# Patient Record
Sex: FEMALE | Race: WHITE | NOT HISPANIC OR LATINO | Employment: FULL TIME | URBAN - METROPOLITAN AREA
[De-identification: names, ages, dates, MRNs, and addresses within clinical notes are randomized per-mention and may not be internally consistent; named-entity substitution may affect disease eponyms.]

---

## 2019-08-08 ENCOUNTER — TELEPHONE (OUTPATIENT)
Dept: TRANSPLANT | Facility: CLINIC | Age: 49
End: 2019-08-08

## 2019-08-08 DIAGNOSIS — I50.9 CONGESTIVE HEART FAILURE, UNSPECIFIED HF CHRONICITY, UNSPECIFIED HEART FAILURE TYPE: Primary | ICD-10-CM

## 2019-08-15 NOTE — TELEPHONE ENCOUNTER
Called to schedule consult appointment. No answer. Left message along with contact information to call back. Records from referring provider scanned into "[employer]" and sent to be scanned into Epic.

## 2019-08-21 NOTE — TELEPHONE ENCOUNTER
REFERRAL NOTE:    Deborah Navas has been referred to the pre-heart transplant office for consideration for orthotopic heart transplantation by Joaquin Del Toro. Patient's appointments have been scheduled for 08/27/19.  Information was provided and questions were answered. AHF/ Transplant Handout, appointment letter and campus map was mailed to the patient. Pleasant. My contact information. Call PRN. Spoke to Kristina with Dr Del Toro office. Informed of appointment as scheduled with Dr Zarate. Also asked if there have been or can be an echo more recent than 12/18 as was having difficulty scheduling echo here to coordinate with available appointment dates/ times. Call PRN.

## 2019-08-27 ENCOUNTER — LAB VISIT (OUTPATIENT)
Dept: LAB | Facility: HOSPITAL | Age: 49
End: 2019-08-27
Attending: INTERNAL MEDICINE
Payer: COMMERCIAL

## 2019-08-27 ENCOUNTER — CLINICAL SUPPORT (OUTPATIENT)
Dept: TRANSPLANT | Facility: CLINIC | Age: 49
End: 2019-08-27
Payer: COMMERCIAL

## 2019-08-27 ENCOUNTER — INITIAL CONSULT (OUTPATIENT)
Dept: TRANSPLANT | Facility: CLINIC | Age: 49
End: 2019-08-27
Payer: COMMERCIAL

## 2019-08-27 ENCOUNTER — EDUCATION (OUTPATIENT)
Dept: TRANSPLANT | Facility: CLINIC | Age: 49
End: 2019-08-27

## 2019-08-27 ENCOUNTER — HOSPITAL ENCOUNTER (OUTPATIENT)
Dept: PULMONOLOGY | Facility: CLINIC | Age: 49
Discharge: HOME | End: 2019-08-27
Payer: COMMERCIAL

## 2019-08-27 VITALS
WEIGHT: 236.13 LBS | SYSTOLIC BLOOD PRESSURE: 111 MMHG | DIASTOLIC BLOOD PRESSURE: 72 MMHG | HEIGHT: 68 IN | HEART RATE: 68 BPM | OXYGEN SATURATION: 96 % | BODY MASS INDEX: 35.79 KG/M2

## 2019-08-27 VITALS — WEIGHT: 236.13 LBS | HEIGHT: 67 IN | BODY MASS INDEX: 37.06 KG/M2

## 2019-08-27 DIAGNOSIS — E87.6 HYPOKALEMIA: ICD-10-CM

## 2019-08-27 DIAGNOSIS — I42.0 PRIMARY DILATED CARDIOMYOPATHY: ICD-10-CM

## 2019-08-27 DIAGNOSIS — I27.22 PULMONARY HYPERTENSION DUE TO LEFT HEART DISEASE: ICD-10-CM

## 2019-08-27 DIAGNOSIS — I50.9 CONGESTIVE HEART FAILURE, UNSPECIFIED HF CHRONICITY, UNSPECIFIED HEART FAILURE TYPE: ICD-10-CM

## 2019-08-27 DIAGNOSIS — I47.20 VENTRICULAR TACHYARRHYTHMIA: ICD-10-CM

## 2019-08-27 DIAGNOSIS — I50.22 CHRONIC SYSTOLIC CONGESTIVE HEART FAILURE: Primary | ICD-10-CM

## 2019-08-27 DIAGNOSIS — Z95.810 CARDIAC DEFIBRILLATOR IN SITU: ICD-10-CM

## 2019-08-27 DIAGNOSIS — I49.01 VENTRICULAR FIBRILLATION: ICD-10-CM

## 2019-08-27 LAB
ALBUMIN SERPL BCP-MCNC: 3.9 G/DL (ref 3.5–5.2)
ALP SERPL-CCNC: 88 U/L (ref 55–135)
ALT SERPL W/O P-5'-P-CCNC: 17 U/L (ref 10–44)
ANION GAP SERPL CALC-SCNC: 14 MMOL/L (ref 8–16)
AST SERPL-CCNC: 19 U/L (ref 10–40)
BASOPHILS # BLD AUTO: 0.09 K/UL (ref 0–0.2)
BASOPHILS NFR BLD: 1.1 % (ref 0–1.9)
BILIRUB SERPL-MCNC: 0.3 MG/DL (ref 0.1–1)
BNP SERPL-MCNC: 1335 PG/ML (ref 0–99)
BUN SERPL-MCNC: 28 MG/DL (ref 6–20)
CALCIUM SERPL-MCNC: 10.1 MG/DL (ref 8.7–10.5)
CHLORIDE SERPL-SCNC: 103 MMOL/L (ref 95–110)
CO2 SERPL-SCNC: 24 MMOL/L (ref 23–29)
CREAT SERPL-MCNC: 1.8 MG/DL (ref 0.5–1.4)
DIFFERENTIAL METHOD: ABNORMAL
EOSINOPHIL # BLD AUTO: 0.3 K/UL (ref 0–0.5)
EOSINOPHIL NFR BLD: 4.1 % (ref 0–8)
ERYTHROCYTE [DISTWIDTH] IN BLOOD BY AUTOMATED COUNT: 13.9 % (ref 11.5–14.5)
EST. GFR  (AFRICAN AMERICAN): 37.6 ML/MIN/1.73 M^2
EST. GFR  (NON AFRICAN AMERICAN): 32.6 ML/MIN/1.73 M^2
GLUCOSE SERPL-MCNC: 116 MG/DL (ref 70–110)
HCT VFR BLD AUTO: 46 % (ref 37–48.5)
HGB BLD-MCNC: 14.3 G/DL (ref 12–16)
IMM GRANULOCYTES # BLD AUTO: 0.02 K/UL (ref 0–0.04)
IMM GRANULOCYTES NFR BLD AUTO: 0.2 % (ref 0–0.5)
LYMPHOCYTES # BLD AUTO: 3.1 K/UL (ref 1–4.8)
LYMPHOCYTES NFR BLD: 38.1 % (ref 18–48)
MCH RBC QN AUTO: 28.6 PG (ref 27–31)
MCHC RBC AUTO-ENTMCNC: 31.1 G/DL (ref 32–36)
MCV RBC AUTO: 92 FL (ref 82–98)
MONOCYTES # BLD AUTO: 0.6 K/UL (ref 0.3–1)
MONOCYTES NFR BLD: 7.5 % (ref 4–15)
NEUTROPHILS # BLD AUTO: 3.9 K/UL (ref 1.8–7.7)
NEUTROPHILS NFR BLD: 49 % (ref 38–73)
NRBC BLD-RTO: 0 /100 WBC
PLATELET # BLD AUTO: 419 K/UL (ref 150–350)
PMV BLD AUTO: 9.9 FL (ref 9.2–12.9)
POTASSIUM SERPL-SCNC: 4.4 MMOL/L (ref 3.5–5.1)
PROT SERPL-MCNC: 7.6 G/DL (ref 6–8.4)
RBC # BLD AUTO: 5 M/UL (ref 4–5.4)
SODIUM SERPL-SCNC: 141 MMOL/L (ref 136–145)
TSH SERPL DL<=0.005 MIU/L-ACNC: 3.21 UIU/ML (ref 0.4–4)
WBC # BLD AUTO: 8.03 K/UL (ref 3.9–12.7)

## 2019-08-27 PROCEDURE — 3008F BODY MASS INDEX DOCD: CPT | Mod: CPTII,S$GLB,TXP, | Performed by: INTERNAL MEDICINE

## 2019-08-27 PROCEDURE — 84443 ASSAY THYROID STIM HORMONE: CPT | Mod: TXP

## 2019-08-27 PROCEDURE — 99999 PR PBB SHADOW E&M-EST. PATIENT-LVL III: CPT | Mod: PBBFAC,TXP,, | Performed by: INTERNAL MEDICINE

## 2019-08-27 PROCEDURE — 85025 COMPLETE CBC W/AUTO DIFF WBC: CPT | Mod: TXP

## 2019-08-27 PROCEDURE — 94618 PULMONARY STRESS TESTING: ICD-10-PCS | Mod: NTX,S$GLB,, | Performed by: INTERNAL MEDICINE

## 2019-08-27 PROCEDURE — 3008F PR BODY MASS INDEX (BMI) DOCUMENTED: ICD-10-PCS | Mod: CPTII,S$GLB,TXP, | Performed by: INTERNAL MEDICINE

## 2019-08-27 PROCEDURE — 36415 COLL VENOUS BLD VENIPUNCTURE: CPT | Mod: TXP

## 2019-08-27 PROCEDURE — 94618 PULMONARY STRESS TESTING: CPT | Mod: NTX,S$GLB,, | Performed by: INTERNAL MEDICINE

## 2019-08-27 PROCEDURE — 83880 ASSAY OF NATRIURETIC PEPTIDE: CPT | Mod: TXP

## 2019-08-27 PROCEDURE — 99204 OFFICE O/P NEW MOD 45 MIN: CPT | Mod: S$GLB,TXP,, | Performed by: INTERNAL MEDICINE

## 2019-08-27 PROCEDURE — 99999 PR PBB SHADOW E&M-EST. PATIENT-LVL III: ICD-10-PCS | Mod: PBBFAC,TXP,, | Performed by: INTERNAL MEDICINE

## 2019-08-27 PROCEDURE — 80053 COMPREHEN METABOLIC PANEL: CPT | Mod: TXP

## 2019-08-27 PROCEDURE — 99204 PR OFFICE/OUTPT VISIT, NEW, LEVL IV, 45-59 MIN: ICD-10-PCS | Mod: S$GLB,TXP,, | Performed by: INTERNAL MEDICINE

## 2019-08-27 RX ORDER — ASPIRIN 81 MG/1
81 TABLET ORAL
Status: ON HOLD | COMMUNITY
End: 2019-10-01 | Stop reason: HOSPADM

## 2019-08-27 RX ORDER — FUROSEMIDE 40 MG/1
40 TABLET ORAL
Status: ON HOLD | COMMUNITY
Start: 2019-06-17 | End: 2019-10-01 | Stop reason: HOSPADM

## 2019-08-27 RX ORDER — METOLAZONE 5 MG/1
2.5 TABLET ORAL DAILY PRN
Status: ON HOLD | COMMUNITY
Start: 2019-05-01 | End: 2019-10-01 | Stop reason: HOSPADM

## 2019-08-27 RX ORDER — POTASSIUM CHLORIDE 1500 MG/1
2 TABLET, EXTENDED RELEASE ORAL
Status: ON HOLD | COMMUNITY
Start: 2019-08-14 | End: 2019-10-01 | Stop reason: HOSPADM

## 2019-08-27 RX ORDER — LORATADINE 10 MG/1
10 TABLET ORAL DAILY PRN
Status: ON HOLD | COMMUNITY
End: 2019-10-01 | Stop reason: HOSPADM

## 2019-08-27 RX ORDER — AMIODARONE HYDROCHLORIDE 200 MG/1
200 TABLET ORAL 2 TIMES DAILY
Status: ON HOLD | COMMUNITY
Start: 2019-08-19 | End: 2019-10-01 | Stop reason: HOSPADM

## 2019-08-27 RX ORDER — ATORVASTATIN CALCIUM 40 MG/1
40 TABLET, FILM COATED ORAL DAILY
Refills: 3 | COMMUNITY
Start: 2019-07-28 | End: 2019-10-07

## 2019-08-27 RX ORDER — SPIRONOLACTONE 25 MG/1
25 TABLET ORAL
Status: ON HOLD | COMMUNITY
Start: 2019-02-08 | End: 2019-10-01 | Stop reason: HOSPADM

## 2019-08-27 NOTE — H&P (VIEW-ONLY)
Subjective:   Initial evaluation of heart transplant candidacy.    HPI:  Ms. Navas is a 48 y.o. year old White female who has presents to be considered for advanced surgical options (LVAD/OHT).     History of dilated cardiomyopathy and heart failure with reduced EF 20% LVEDd 6.8 cm. She has a history of ICD implantation (Tamie?). Several appropriate shocks (hypokalemia)    Here fro HARSH work up    Six minute distance 1050      Past Medical History:   Diagnosis Date    Fractures     Hyperlipidemia     Migraine     Osteoarthritis      Past Surgical History:   Procedure Laterality Date    BACK SURGERY      2007    BONE GRAFT Left     from Left hip to Left FA    eardrum reconstruction  1980    ELBOW SURGERY Left 0316-4225    FOREARM FRACTURE SURGERY Bilateral 9035-2679    multiple surgeries    INSERTION OF IMPLANTABLE CARDIOVERTER-DEFIBRILLATOR (ICD) GENERATOR WITH TWO EXISTING LEADS      INSERTION OF PACEMAKER Left     LUMBAR FUSION  2007    L4-L5    SINUS SURGERY Right 1994    with lymph nodes    TEMPOROMANDIBULAR JOINT SURGERY Right 1988    TONSILLECTOMY      TYMPANOSTOMY TUBE PLACEMENT  1971- 1979    multiple tube placements       Family History   Problem Relation Age of Onset    Osteoarthritis Mother     Migraines Mother     Osteoarthritis Maternal Grandmother     Migraines Maternal Grandmother     Broken bones Maternal Grandmother     Osteoporosis Maternal Grandmother     Dislocations Maternal Grandmother     Scoliosis Maternal Grandmother     Osteoarthritis Paternal Grandmother     Cancer Paternal Grandmother         leukemia    Migraines Paternal Grandmother     Obesity Paternal Grandmother     Osteoarthritis Paternal Grandfather     Heart failure Paternal Grandfather     Migraines Paternal Grandfather     Heart failure Maternal Grandfather     Migraines Maternal Grandfather     Osteoarthritis Maternal Grandfather     Stroke Father     Osteoarthritis Father   "      ROS    Objective:   Blood pressure 111/72, pulse 68, height 5' 7.5" (1.715 m), weight 107.1 kg (236 lb 1.8 oz), SpO2 96 %.body mass index is 36.43 kg/m².    Physical Exam    Labs:      Chemistry        Component Value Date/Time     08/27/2019 0651    K 4.4 08/27/2019 0651     08/27/2019 0651    CO2 24 08/27/2019 0651    BUN 28 (H) 08/27/2019 0651    CREATININE 1.8 (H) 08/27/2019 0651     (H) 08/27/2019 0651        Component Value Date/Time    CALCIUM 10.1 08/27/2019 0651    ALKPHOS 88 08/27/2019 0651    AST 19 08/27/2019 0651    ALT 17 08/27/2019 0651    BILITOT 0.3 08/27/2019 0651    ESTGFRAFRICA 37.6 (A) 08/27/2019 0651    EGFRNONAA 32.6 (A) 08/27/2019 0651          No results found for: MG  Lab Results   Component Value Date    WBC 8.03 08/27/2019    HGB 14.3 08/27/2019    HCT 46.0 08/27/2019    MCV 92 08/27/2019     (H) 08/27/2019     BNP   Date Value Ref Range Status   08/27/2019 1,335 (H) 0 - 99 pg/mL Final     Comment:     Values of less than 100 pg/ml are consistent with non-CHF populations.     No results found for this or any previous visit.    Labs were reviewed with the patient.    Assessment:      1. Chronic systolic congestive heart failure    2. Pulmonary hypertension due to left heart disease    3. Cardiac defibrillator in situ    4. Primary dilated cardiomyopathy    5. Ventricular fibrillation    6. Ventricular tachyarrhythmia    7. Hypokalemia        Plan:   HFrEF FC II III EFRAIN s/p ICD (several shocks)    We will start RHC and go from     Patient is now NYHA III ACC stage D  Recommend 2 gram sodium restriction and 1500cc fluid restriction.  Encourage physical activity with graded exercise program.  Requested patient to weigh themselves daily, and to notify us if their weight increases by more than 3 lbs in 1 day or 5 lbs in 1 week.     Transplant Candidacy: Patient is a 48 y.o. year old female with heart failure is being seen for possible LVAD and OHT. In my opinion, " she is  an excellent LVAD and OHT candidate. Patient did meet with MCS and/or pre-transplant coordinator at the end of this visit for workup. she is scheduled for risk stratification testing with CPX/RHC. The patient will follow up with pre-transplant.        UNOS Patient Status  Functional Status: 80% - Normal activity with effort: some symptoms of disease  Physical Capacity: Limited Mobility  Working for Income: yes  If yes, working activity level: Working Part Time Due to Demands of Treatment    Johny Zarate MD

## 2019-08-27 NOTE — LETTER
August 27, 2019        Joaquin Del Toro  [address]  [address]  [facility] [address]  Phone: [phone]  Fax: [phone]             [facility] [address]  [facility] [address]  Phone: [phone]   Patient: Deborah Navas   MR Number: [id number]   YOB: 1970   Date of Visit: 8/27/2019       Dear Dr. Joaquin Del Toro    Thank you for referring Deborah Navas to me for evaluation. Attached you will find relevant portions of my assessment and plan of care.    If you have questions, please do not hesitate to call me. I look forward to following Deborah Navas along with you.    Sincerely,    Johny Zarate MD    Enclosure    If you would like to receive this communication electronically, please contact [email or web address] or [phone] to request [employer] access.    [employer] is a tool which provides read-only access to select patient information with whom you have a relationship. Its easy to use and provides real time access to review your patients record including encounter summaries, notes, results, and demographic information.    If you feel you have received this communication in error or would no longer like to receive these types of communications, please e-mail [email or web address]

## 2019-08-27 NOTE — PROGRESS NOTES
Subjective:   Initial evaluation of heart transplant candidacy.    HPI:  Ms. Navas is a 48 y.o. year old White female who has presents to be considered for advanced surgical options (LVAD/OHT).     History of dilated cardiomyopathy and heart failure with reduced EF 20% LVEDd 6.8 cm. She has a history of ICD implantation (Tamie?). Several appropriate shocks (hypokalemia)    Here fro HARSH work up    Six minute distance 1050      Past Medical History:   Diagnosis Date    Fractures     Hyperlipidemia     Migraine     Osteoarthritis      Past Surgical History:   Procedure Laterality Date    BACK SURGERY      2007    BONE GRAFT Left     from Left hip to Left FA    eardrum reconstruction  1980    ELBOW SURGERY Left 4014-0437    FOREARM FRACTURE SURGERY Bilateral 0061-5174    multiple surgeries    INSERTION OF IMPLANTABLE CARDIOVERTER-DEFIBRILLATOR (ICD) GENERATOR WITH TWO EXISTING LEADS      INSERTION OF PACEMAKER Left     LUMBAR FUSION  2007    L4-L5    SINUS SURGERY Right 1994    with lymph nodes    TEMPOROMANDIBULAR JOINT SURGERY Right 1988    TONSILLECTOMY      TYMPANOSTOMY TUBE PLACEMENT  1971- 1979    multiple tube placements       Family History   Problem Relation Age of Onset    Osteoarthritis Mother     Migraines Mother     Osteoarthritis Maternal Grandmother     Migraines Maternal Grandmother     Broken bones Maternal Grandmother     Osteoporosis Maternal Grandmother     Dislocations Maternal Grandmother     Scoliosis Maternal Grandmother     Osteoarthritis Paternal Grandmother     Cancer Paternal Grandmother         leukemia    Migraines Paternal Grandmother     Obesity Paternal Grandmother     Osteoarthritis Paternal Grandfather     Heart failure Paternal Grandfather     Migraines Paternal Grandfather     Heart failure Maternal Grandfather     Migraines Maternal Grandfather     Osteoarthritis Maternal Grandfather     Stroke Father     Osteoarthritis Father   "      ROS    Objective:   Blood pressure 111/72, pulse 68, height 5' 7.5" (1.715 m), weight 107.1 kg (236 lb 1.8 oz), SpO2 96 %.body mass index is 36.43 kg/m².    Physical Exam    Labs:      Chemistry        Component Value Date/Time     08/27/2019 0651    K 4.4 08/27/2019 0651     08/27/2019 0651    CO2 24 08/27/2019 0651    BUN 28 (H) 08/27/2019 0651    CREATININE 1.8 (H) 08/27/2019 0651     (H) 08/27/2019 0651        Component Value Date/Time    CALCIUM 10.1 08/27/2019 0651    ALKPHOS 88 08/27/2019 0651    AST 19 08/27/2019 0651    ALT 17 08/27/2019 0651    BILITOT 0.3 08/27/2019 0651    ESTGFRAFRICA 37.6 (A) 08/27/2019 0651    EGFRNONAA 32.6 (A) 08/27/2019 0651          No results found for: MG  Lab Results   Component Value Date    WBC 8.03 08/27/2019    HGB 14.3 08/27/2019    HCT 46.0 08/27/2019    MCV 92 08/27/2019     (H) 08/27/2019     BNP   Date Value Ref Range Status   08/27/2019 1,335 (H) 0 - 99 pg/mL Final     Comment:     Values of less than 100 pg/ml are consistent with non-CHF populations.     No results found for this or any previous visit.    Labs were reviewed with the patient.    Assessment:      1. Chronic systolic congestive heart failure    2. Pulmonary hypertension due to left heart disease    3. Cardiac defibrillator in situ    4. Primary dilated cardiomyopathy    5. Ventricular fibrillation    6. Ventricular tachyarrhythmia    7. Hypokalemia        Plan:   HFrEF FC II III EFRAIN s/p ICD (several shocks)    We will start RHC and go from     Patient is now NYHA III ACC stage D  Recommend 2 gram sodium restriction and 1500cc fluid restriction.  Encourage physical activity with graded exercise program.  Requested patient to weigh themselves daily, and to notify us if their weight increases by more than 3 lbs in 1 day or 5 lbs in 1 week.     Transplant Candidacy: Patient is a 48 y.o. year old female with heart failure is being seen for possible LVAD and OHT. In my opinion, " she is  an excellent LVAD and OHT candidate. Patient did meet with MCS and/or pre-transplant coordinator at the end of this visit for workup. she is scheduled for risk stratification testing with CPX/RHC. The patient will follow up with pre-transplant.        UNOS Patient Status  Functional Status: 80% - Normal activity with effort: some symptoms of disease  Physical Capacity: Limited Mobility  Working for Income: yes  If yes, working activity level: Working Part Time Due to Demands of Treatment    Johny Zarate MD

## 2019-08-27 NOTE — PROGRESS NOTES
At the request of Dr. Zarate, I have been asked to meet patient and provide VAD education. Introduced self and reason for visit. Pt and mother AAAO.  Provided phase 1 written VAD education. Included in Phase 1 folder is the following:     Evaluation Eval for [facility]  VAD support flyer  Jose: Living a more active life  Living with hVAD system  [employer] pamphlet  Picture of 3 VADs offered at [facility]    Explained that we use 3 different types of pumps here and information on pumps is in the black folder. I explained the work up process as well.     Explained to look over the entire contents and read Evaluation Eval for [facility] acknowledgement form.  Also explained that they should bring this folder with them to all clinic visits and if they are admitted to the hospital so that we can continue education as needed. Should there be any questions, please write them down and bring with you or feel free to call and we can talk on the phone. All questions answered to satisfaction as evidence by verbal acknowledgement.

## 2019-08-28 NOTE — PROCEDURES
Deborah Navas is a 48 y.o.  female patient, who presents for a 6 minute walk test ordered by MD Elliot.  The diagnosis is Pre Heart Transplant.  The patient's BMI is 37.1 kg/m2.  Predicted distance (lower limit of normal) is 360.83 meters.      Test Results:    The test was completed without stopping.  The total time walked was 360 seconds.  During walking, the patient reported:  Dyspnea. The patient used no assistive devices  during testing.     08/27/2019---------Distance: 320.04 meters (1050 feet)     O2 Sat % Supplemental Oxygen Heart Rate Blood Pressure Lesley Scale   Pre-exercise  (Resting) 98 % Room Air 74 bpm 106/71 mmHg 1   During Exercise 97 % Room Air 102 bpm 133/74 mmHg 7-8   Post-exercise  (Recovery) 99 % Room Air  93 bpm   mmHg       Recovery Time: 49 seconds    Performing nurse/tech: Koki SINGH      PREVIOUS STUDY:   The patient has not had a previous study.      CLINICAL INTERPRETATION:  Six minute walk distance is 320.04 meters (1050 feet) with very heavy dyspnea.  During exercise, there was no significant desaturation while breathing room air.  Blood pressure remained stable and Heart rate increased significantly with walking.  This may represent a tachycardic response to exercise.  The patient did not report non-pulmonary symptoms during exercise.  No previous study performed.  Based upon age and body mass index, exercise capacity is less than predicted.

## 2019-08-30 ENCOUNTER — TELEPHONE (OUTPATIENT)
Dept: TRANSPLANT | Facility: CLINIC | Age: 49
End: 2019-08-30

## 2019-08-30 NOTE — TELEPHONE ENCOUNTER
Advised pt that she can still have RHC if she has a cold.             ----- Message from Cherelle Hunter sent at 8/30/2019 12:16 PM CDT -----  Contact: pt [phone]  Pt has a procedure scheduled for 9/4/19 and she has a head cold now. She wants to know if she can still have the procedure    Thanks

## 2019-09-04 ENCOUNTER — HOSPITAL ENCOUNTER (INPATIENT)
Facility: HOSPITAL | Age: 49
LOS: 27 days | Discharge: HOME HEALTH | DRG: 001 | End: 2019-10-01
Attending: INTERNAL MEDICINE | Admitting: INTERNAL MEDICINE
Payer: COMMERCIAL

## 2019-09-04 DIAGNOSIS — I47.20 VENTRICULAR TACHYARRHYTHMIA: ICD-10-CM

## 2019-09-04 DIAGNOSIS — R07.9 CHEST PAIN: ICD-10-CM

## 2019-09-04 DIAGNOSIS — K59.00 CONSTIPATION, UNSPECIFIED CONSTIPATION TYPE: ICD-10-CM

## 2019-09-04 DIAGNOSIS — I48.92 ATRIAL FLUTTER: ICD-10-CM

## 2019-09-04 DIAGNOSIS — I50.20 SYSTOLIC HF (HEART FAILURE): ICD-10-CM

## 2019-09-04 DIAGNOSIS — R73.9 ACUTE HYPERGLYCEMIA: ICD-10-CM

## 2019-09-04 DIAGNOSIS — I48.91 A-FIB: ICD-10-CM

## 2019-09-04 DIAGNOSIS — Z95.810 ICD (IMPLANTABLE CARDIOVERTER-DEFIBRILLATOR) IN PLACE: ICD-10-CM

## 2019-09-04 DIAGNOSIS — Z71.89 GOALS OF CARE, COUNSELING/DISCUSSION: ICD-10-CM

## 2019-09-04 DIAGNOSIS — I50.43 ACUTE ON CHRONIC COMBINED SYSTOLIC AND DIASTOLIC CONGESTIVE HEART FAILURE: ICD-10-CM

## 2019-09-04 DIAGNOSIS — Z95.811 LVAD (LEFT VENTRICULAR ASSIST DEVICE) PRESENT: ICD-10-CM

## 2019-09-04 DIAGNOSIS — Z79.01 ANTICOAGULATION MONITORING, INR RANGE 2-3: ICD-10-CM

## 2019-09-04 DIAGNOSIS — Z99.11 ENCOUNTER FOR WEANING FROM VENTILATOR: ICD-10-CM

## 2019-09-04 DIAGNOSIS — I49.01 VENTRICULAR FIBRILLATION: ICD-10-CM

## 2019-09-04 DIAGNOSIS — I42.0 CONGESTIVE CARDIOMYOPATHY: ICD-10-CM

## 2019-09-04 DIAGNOSIS — I48.91 ATRIAL FIBRILLATION AND FLUTTER: ICD-10-CM

## 2019-09-04 DIAGNOSIS — J90 PLEURAL EFFUSION: ICD-10-CM

## 2019-09-04 DIAGNOSIS — I49.8 ATRIAL ARRHYTHMIA: ICD-10-CM

## 2019-09-04 DIAGNOSIS — I48.91 ATRIAL FIBRILLATION, UNSPECIFIED TYPE: ICD-10-CM

## 2019-09-04 DIAGNOSIS — I50.9 CHF (CONGESTIVE HEART FAILURE): ICD-10-CM

## 2019-09-04 DIAGNOSIS — I95.1 ORTHOSTATIC HYPOTENSION: ICD-10-CM

## 2019-09-04 DIAGNOSIS — R00.1 BRADYCARDIA: ICD-10-CM

## 2019-09-04 DIAGNOSIS — Z86.79 HISTORY OF VENTRICULAR TACHYCARDIA: ICD-10-CM

## 2019-09-04 DIAGNOSIS — I50.9 HEART FAILURE: ICD-10-CM

## 2019-09-04 DIAGNOSIS — Z76.82 HEART TRANSPLANT CANDIDATE: ICD-10-CM

## 2019-09-04 DIAGNOSIS — Z51.5 PALLIATIVE CARE ENCOUNTER: ICD-10-CM

## 2019-09-04 DIAGNOSIS — I50.43 ACUTE ON CHRONIC COMBINED SYSTOLIC AND DIASTOLIC HEART FAILURE: Primary | ICD-10-CM

## 2019-09-04 DIAGNOSIS — I50.9 HF (HEART FAILURE): ICD-10-CM

## 2019-09-04 DIAGNOSIS — N18.9 CHRONIC KIDNEY DISEASE, UNSPECIFIED CKD STAGE: ICD-10-CM

## 2019-09-04 DIAGNOSIS — Z95.810 CARDIAC DEFIBRILLATOR IN SITU: ICD-10-CM

## 2019-09-04 DIAGNOSIS — I48.92 ATRIAL FIBRILLATION AND FLUTTER: ICD-10-CM

## 2019-09-04 DIAGNOSIS — I50.22 CHRONIC SYSTOLIC CONGESTIVE HEART FAILURE: ICD-10-CM

## 2019-09-04 PROBLEM — E87.6 HYPOKALEMIA: Status: RESOLVED | Noted: 2019-05-08 | Resolved: 2019-09-04

## 2019-09-04 LAB
ANION GAP SERPL CALC-SCNC: 11 MMOL/L (ref 8–16)
BUN SERPL-MCNC: 35 MG/DL (ref 6–20)
CALCIUM SERPL-MCNC: 9.7 MG/DL (ref 8.7–10.5)
CHLORIDE SERPL-SCNC: 103 MMOL/L (ref 95–110)
CO2 SERPL-SCNC: 26 MMOL/L (ref 23–29)
CREAT SERPL-MCNC: 1.5 MG/DL (ref 0.5–1.4)
EST. GFR  (AFRICAN AMERICAN): 46.8 ML/MIN/1.73 M^2
EST. GFR  (NON AFRICAN AMERICAN): 40.6 ML/MIN/1.73 M^2
GLUCOSE SERPL-MCNC: 113 MG/DL (ref 70–110)
MAGNESIUM SERPL-MCNC: 2.3 MG/DL (ref 1.6–2.6)
POTASSIUM SERPL-SCNC: 4.3 MMOL/L (ref 3.5–5.1)
SODIUM SERPL-SCNC: 140 MMOL/L (ref 136–145)

## 2019-09-04 PROCEDURE — 93451 RIGHT HEART CATH: CPT | Mod: 26,NTX,, | Performed by: INTERNAL MEDICINE

## 2019-09-04 PROCEDURE — C1894 INTRO/SHEATH, NON-LASER: HCPCS | Mod: NTX | Performed by: INTERNAL MEDICINE

## 2019-09-04 PROCEDURE — 20600001 HC STEP DOWN PRIVATE ROOM: Mod: NTX

## 2019-09-04 PROCEDURE — 93010 EKG 12-LEAD: ICD-10-PCS | Mod: NTX,,, | Performed by: INTERNAL MEDICINE

## 2019-09-04 PROCEDURE — 63600175 PHARM REV CODE 636 W HCPCS: Mod: NTX | Performed by: INTERNAL MEDICINE

## 2019-09-04 PROCEDURE — C1751 CATH, INF, PER/CENT/MIDLINE: HCPCS | Mod: NTX | Performed by: INTERNAL MEDICINE

## 2019-09-04 PROCEDURE — 93005 ELECTROCARDIOGRAM TRACING: CPT | Mod: NTX

## 2019-09-04 PROCEDURE — 80048 BASIC METABOLIC PNL TOTAL CA: CPT | Mod: NTX

## 2019-09-04 PROCEDURE — 25000003 PHARM REV CODE 250: Mod: TXP | Performed by: INTERNAL MEDICINE

## 2019-09-04 PROCEDURE — 99222 PR INITIAL HOSPITAL CARE,LEVL II: ICD-10-PCS | Mod: NTX,,, | Performed by: INTERNAL MEDICINE

## 2019-09-04 PROCEDURE — 99222 1ST HOSP IP/OBS MODERATE 55: CPT | Mod: NTX,,, | Performed by: INTERNAL MEDICINE

## 2019-09-04 PROCEDURE — 83735 ASSAY OF MAGNESIUM: CPT | Mod: NTX

## 2019-09-04 PROCEDURE — 25000003 PHARM REV CODE 250: Mod: NTX | Performed by: INTERNAL MEDICINE

## 2019-09-04 PROCEDURE — 93451 PR RIGHT HEART CATH O2 SATURATION & CARDIAC OUTPUT: ICD-10-PCS | Mod: 26,NTX,, | Performed by: INTERNAL MEDICINE

## 2019-09-04 PROCEDURE — 25000003 PHARM REV CODE 250: Mod: NTX | Performed by: STUDENT IN AN ORGANIZED HEALTH CARE EDUCATION/TRAINING PROGRAM

## 2019-09-04 PROCEDURE — 93451 RIGHT HEART CATH: CPT | Mod: NTX | Performed by: INTERNAL MEDICINE

## 2019-09-04 PROCEDURE — 93010 ELECTROCARDIOGRAM REPORT: CPT | Mod: NTX,,, | Performed by: INTERNAL MEDICINE

## 2019-09-04 DEVICE — CATH BLUE FLEXTIP TL 7FR: Type: IMPLANTABLE DEVICE | Site: NECK | Status: NON-FUNCTIONAL

## 2019-09-04 RX ORDER — DOBUTAMINE HYDROCHLORIDE 400 MG/100ML
5 INJECTION, SOLUTION INTRAVENOUS CONTINUOUS
Status: DISCONTINUED | OUTPATIENT
Start: 2019-09-04 | End: 2019-09-10

## 2019-09-04 RX ORDER — AMIODARONE HYDROCHLORIDE 200 MG/1
200 TABLET ORAL 2 TIMES DAILY
Status: DISCONTINUED | OUTPATIENT
Start: 2019-09-04 | End: 2019-09-10

## 2019-09-04 RX ORDER — DOCUSATE SODIUM 100 MG/1
100 CAPSULE, LIQUID FILLED ORAL DAILY
Status: ON HOLD | COMMUNITY
End: 2019-10-01 | Stop reason: HOSPADM

## 2019-09-04 RX ORDER — SPIRONOLACTONE 25 MG/1
25 TABLET ORAL DAILY
Status: DISCONTINUED | OUTPATIENT
Start: 2019-09-05 | End: 2019-09-10

## 2019-09-04 RX ORDER — GABAPENTIN 400 MG/1
800 CAPSULE ORAL 2 TIMES DAILY
Status: DISCONTINUED | OUTPATIENT
Start: 2019-09-04 | End: 2019-09-05

## 2019-09-04 RX ORDER — SODIUM CHLORIDE 0.9 % (FLUSH) 0.9 %
3 SYRINGE (ML) INJECTION EVERY 8 HOURS PRN
Status: DISCONTINUED | OUTPATIENT
Start: 2019-09-04 | End: 2019-09-10

## 2019-09-04 RX ORDER — ATORVASTATIN CALCIUM 20 MG/1
40 TABLET, FILM COATED ORAL DAILY
Status: DISCONTINUED | OUTPATIENT
Start: 2019-09-05 | End: 2019-09-08

## 2019-09-04 RX ORDER — DIPHENHYDRAMINE HCL 25 MG
25 CAPSULE ORAL EVERY 6 HOURS PRN
Status: DISCONTINUED | OUTPATIENT
Start: 2019-09-04 | End: 2019-09-10

## 2019-09-04 RX ORDER — DOCUSATE SODIUM 100 MG/1
100 CAPSULE, LIQUID FILLED ORAL DAILY
Status: DISCONTINUED | OUTPATIENT
Start: 2019-09-05 | End: 2019-09-10

## 2019-09-04 RX ORDER — SODIUM CHLORIDE 0.9 G/100ML
IRRIGANT IRRIGATION
Status: DISCONTINUED | OUTPATIENT
Start: 2019-09-04 | End: 2019-09-04 | Stop reason: HOSPADM

## 2019-09-04 RX ORDER — POTASSIUM CHLORIDE 20 MEQ/1
40 TABLET, EXTENDED RELEASE ORAL DAILY
Status: DISCONTINUED | OUTPATIENT
Start: 2019-09-05 | End: 2019-09-08

## 2019-09-04 RX ORDER — LIDOCAINE HYDROCHLORIDE 20 MG/ML
INJECTION, SOLUTION INFILTRATION; PERINEURAL
Status: DISCONTINUED | OUTPATIENT
Start: 2019-09-04 | End: 2019-09-04 | Stop reason: HOSPADM

## 2019-09-04 RX ORDER — HEPARIN SODIUM 5000 [USP'U]/ML
5000 INJECTION, SOLUTION INTRAVENOUS; SUBCUTANEOUS EVERY 8 HOURS
Status: DISCONTINUED | OUTPATIENT
Start: 2019-09-04 | End: 2019-09-09

## 2019-09-04 RX ORDER — ASPIRIN 81 MG/1
81 TABLET ORAL DAILY
Status: DISCONTINUED | OUTPATIENT
Start: 2019-09-05 | End: 2019-09-10

## 2019-09-04 RX ADMIN — DIPHENHYDRAMINE HYDROCHLORIDE 25 MG: 25 CAPSULE ORAL at 11:09

## 2019-09-04 RX ADMIN — FUROSEMIDE 10 MG/HR: 10 INJECTION, SOLUTION INTRAMUSCULAR; INTRAVENOUS at 08:09

## 2019-09-04 RX ADMIN — GABAPENTIN 800 MG: 400 CAPSULE ORAL at 09:09

## 2019-09-04 RX ADMIN — AMIODARONE HYDROCHLORIDE 200 MG: 200 TABLET ORAL at 09:09

## 2019-09-04 RX ADMIN — HEPARIN SODIUM 5000 UNITS: 5000 INJECTION, SOLUTION INTRAVENOUS; SUBCUTANEOUS at 09:09

## 2019-09-04 RX ADMIN — DOBUTAMINE IN DEXTROSE 2.5 MCG/KG/MIN: 200 INJECTION, SOLUTION INTRAVENOUS at 08:09

## 2019-09-04 NOTE — NURSING TRANSFER
Nursing Transfer Note      9/4/2019     Transfer From: cath lab    Transfer via stretcher    Transfer with cardiac monitoring    Transported by valentin obsorn    Medicines sent: none        Notified: sister is at bedside

## 2019-09-04 NOTE — LETTER
[address]  [facility] [address]  Phone: [phone]  Fax: [phone]            09/23/2019        [facility] 's Office  [address] [place] [address]    To Whom It May Concern,     This letter is to inform you that we anticipate that one of our patients will be discharged into your community.  We want to let you know because this patient has special healthcare needs.      The patient (whose contact information appears below) has a Heartmate 3 Left Ventricular Assist Device (LVAD) or blood pump.  The device is surgically implanted alongside the patients native heart.  It takes over the pumping function of the patients sick or weakened heart so that the patients lungs, organs, and tissues get the oxygen-rich blood they need.  The LVAD is a life-sustaining device. The patients information is as follows:    Deborah Hazel Yosef  48 y.o. 1970  [address] [facility] [address]    This device runs on either AC power or external batteries.  The external batteries are worn in a double holster located on both the right and left side of the body.  (See photo). The holster cannot be removed.  The Heartmate  will be connected between the patient and wall unit or external batteries. THIS PATIENT MAY NOT HAVE A PALPABLE PULSE.    For additional information, visit [email or web address] for further VAD education materials.       Please contact one of the VAD coordinators with any further questions or concerns.  Kate De Los Santos RN, CCRN     [phone]   Suha Meraz RN--[phone]   SASHA De LeónN, RN, CCRN [phone]   IDALMIS Shafer, RN--[phone]   IDALMIS Jasso, RN, CCRN [phone]   VAD Office Fax: [phone]  Sincerely,      Miriam Prieto M.D.  Medical Director, Mechanical Circulatory Device Support Program  Section of Cardiomyopathy & Heart Transplantation

## 2019-09-04 NOTE — LETTER
[address]  [facility] [address]  Phone: [phone]  Fax: [phone]                                  09/23/2019  [facility] [address] [place] [address]      To Whom It May Concern:    This letter is to inform you that one of our patients will be discharged to your community.  We want to let you know because this patient has special health needs.     This patient (whose information appears below) has a Heartmate 3 Left Ventricular Assist Device (LVAD) or blood pump.   The device is implanted inside the patients native heart.  It takes over pumping so that the patients organs and tissues get the oxygen-rich blood they need.  The VAD is a life-sustaining device.  The patients information is as follows:    Deborah Warrenalex Navas  48 y.o. 1970  [address] [place] [address]    This device runs on batteries and on AC power.  If the patient should present to you during a medical emergency, please contact us immediately at ([phone]) and ask to page VAD coordinator on call for VAD-specific emergency instructions. THIS PATIENT CAN HAVE CHEST COMPRESSIONS if medically necessary and CAN BE Defibrillated/DC Cardioverted without disconnecting the controller. Please DO NOT use a DADA  Device.  This device is fluid dependent so  cc to start if required.   THIS PATIENT MAY NOT HAVE A BLOOD PRESSURE OR A PALPABLE PULSE.     For additional information, visit [email or web address] for further VAD education materials.       Please contact one of the VAD coordinators with any further questions or concerns.  Kate De Los Santos, RN, CCRN     [phone]   Suha Meraz, RN--[phone]   SASHA De LeónN, RN, CCRN [phone]   IDALMIS Shafer, RN--[phone]   IDALMIS Jasso, RN, CCRN [phone]   VAD Office Fax: [phone]  Sincerely,      Miriam Prieto M.D.  Medical Director, Mechanical Circulatory Device Support Program  Section of  Cardiomyopathy & Heart Transplantation

## 2019-09-04 NOTE — NURSING
Report called to roshan, rn, pt going to room 310, vss, no pain/sob, sister Flavia notified, R IJ cordis/tlc intact, all belongings sent with patient

## 2019-09-04 NOTE — LETTER
[address]  [facility] [address]  Phone: [phone]  Fax: [phone]                                  09/23/2019  [facility]  [address] [place] [address]      To Whom It May Concern:    This letter is to inform you that one of our patients will be discharged to your community.  We want to let you know because this patient has special health needs.     This patient (whose information appears below) has a Heartmate 3 Left Ventricular Assist Device (LVAD) or blood pump.   The device is implanted inside the patients native heart.  It takes over pumping so that the patients organs and tissues get the oxygen-rich blood they need.  The VAD is a life-sustaining device.  The patients information is as follows:    Deborah Oliva Yosef  48 y.o. 1970  [address] [facility] [address]    This device runs on batteries and on AC power.  If the patient should present to you during a medical emergency, please contact us immediately at ([phone]) and ask to page VAD coordinator on call for VAD-specific emergency instructions. THIS PATIENT CAN HAVE CHEST COMPRESSIONS if medically necessary and CAN BE Defibrillated/DC Cardioverted without disconnecting the controller. Please DO NOT use a DADA  Device.  This device is fluid dependent so  cc to start if required.   THIS PATIENT MAY NOT HAVE A BLOOD PRESSURE OR A PALPABLE PULSE.     For additional information, visit [email or web address] for further VAD education materials.       Please contact one of the VAD coordinators with any further questions or concerns.  Kate De Los Santos, RN, CCRN     [phone]   Suha Meraz RN--[phone]   SASHA De LeónN, RN, CCRN [phone]   IDALMIS Shafer, RN--[phone]   IDALMIS Jasso, RN, CCRN [phone]   VAD Office Fax: [phone]  Sincerely,      Miriam Prieto M.D.  Medical Director, Mechanical Circulatory Device Support Program  Section of  Cardiomyopathy & Heart Transplantation

## 2019-09-04 NOTE — DISCHARGE INSTRUCTIONS

## 2019-09-04 NOTE — H&P
Please see history and physical of Marjorie Hairston    She reports class 3 NYHA symptomatology  Neck veins too high based upon RHC to see on  Exam  Lungs clear  S1 and 2 OK; resting S3 grade 1/6 systolic murmur  Abdomen obese hard to determine liver span  No significant edema    She underwent right heart catheterization today that demonstrated severely reduced cardiac index 1.0 with elevated right and left sided filling pressure.  While creatinine elevation is noted she has normal liver function.    She reports 5 discharges of her ICD this year to occurred close proximity the others were  by 2-3 months and she reports all were associated with potassium of approximately 3.  As result, she should tolerate an inotrope and I would like to did initiate this in the hospital to observe her rhythm and response of her creatinine.    I spoke with her about both cardiac transplantation and left ventricular assist device implantation.  She received literature on these procedures as an outpatient.  She scanned them but has not been done an in-depth dive so I asked her to do that now.  I discussed the mortality and morbidity of an LVAD versus medical therapy of her heart failure and did the same for cardiac transplantation.  She is blood type O and large woman thus it is impractical to think that she will have a short wait for transplant if she is indeed a candidate.  Therefore, if her left ventricular size allows for LVAD implantation that would be the preferable 1st step.  She understands this and wishes to proceed.  Will initiate pathway.  We need to get an echocardiogram here for left ventricular dimensions.

## 2019-09-04 NOTE — LETTER
[address]  [facility] [address]  Phone: [phone]  Fax: [phone]            09/23/2019  Dr. Valeria Verma  [address] [address]      Dear Dr. Verma,    One of our patients was recently implanted with a Left Ventricular Assist Device (LVAD).  This device takes over the pumping function of the native hearts left ventricle.    I am contacting you because this patient (whose information appears below) is about to be discharged from the hospital and return home. This patient has identified you as the Primary Care Physician.  If the patient should present to you during a medical emergency, please contact us immediately for LVAD-specific emergency instructions.    Deborah Navas  48 y.o. 1970  [address] [place] [address]    I have included educational material regarding the LVAD for you and your staff.  This patient is fully trained to handle the LVAD properly.     For additional information, visit [email or web address] for further VAD education materials.       Please contact one of the VAD coordinators with any further questions or concerns.  Kate De Los Santos, RN, CCRN     [phone]   Suha Meraz, RN--[phone]   Nicole Jesus, SASHAN, RN, CCRN [phone]   Aimee Kelley, BSN, RN--[phone]   SASHA JassoN, RN, CCRN [phone]   VAD Office Fax: [phone]  Sincerely,      Miriam Prieto M.D.  Medical Director, Mechanical Circulatory Device Support Program  Section of Cardiomyopathy & Heart Transplantation

## 2019-09-04 NOTE — LETTER
[address]  [facility] [address]  Phone: [phone]  Fax: [phone]                                  09/23/2019  [facility] [address]  [place] [address]      To Whom It May Concern:    This letter is to inform you that one of our patients will be discharged to your community.  We want to let you know because this patient has special health needs.     This patient (whose information appears below) has a Heartmate 3 Left Ventricular Assist Device (LVAD) or blood pump.   The device is implanted inside the patients native heart.  It takes over pumping so that the patients organs and tissues get the oxygen-rich blood they need.  The VAD is a life-sustaining device.  The patients information is as follows:    Deborah Navas  48 y.o. 1970  [address] [facility] [place] [address]    This device runs on batteries and on AC power.  If the patient should present to you during a medical emergency, please contact us immediately at ([phone]) and ask to page VAD coordinator on call for VAD-specific emergency instructions. THIS PATIENT CAN HAVE CHEST COMPRESSIONS if medically necessary and CAN BE Defibrillated/DC Cardioverted without disconnecting the controller. Please DO NOT use a DADA  Device.  This device is fluid dependent so  cc to start if required.   THIS PATIENT MAY NOT HAVE A BLOOD PRESSURE OR A PALPABLE PULSE.     For additional information, visit [email or web address] for further VAD education materials.       Please contact one of the VAD coordinators with any further questions or concerns.  Kate De Los Santos, RN, CCRN     [phone]   Suha Meraz RN--[phone]   SASHA De LeónN, RN, CCRN [phone]   IDALMIS Shafer, RN--[phone]   IDALMIS Jasso, RN, CCRN [phone]   VAD Office Fax: [phone]  Sincerely,      Miriam Prieto M.D.  Medical Director, Mechanical Circulatory Device Support Program  Section of  Cardiomyopathy & Heart Transplantation

## 2019-09-04 NOTE — DISCHARGE SUMMARY
Date of admit to cath lab: 9/4/2019      Date of discharge from cath lab: 9/4/2019      Principal diagnosis: cardiogenic shock    Discharge attending physician: Vi Bryant MD    Hospital Course/Outcome of the treatment, procedures or surgery: Pt admitted for RHC.   See CVIS/cath lab procedure report in EPIC  for full report of today's procedure.    Disposition of the case (d/c disposition): holding area, admit to CMICU/CSU pending bed avail    Discharge Medication List: see med card    Plan for follow up care, diet, activity level: F/U as scheduled. Resume low Na diet.  Activity as tolerated    Condition on discharge from Cath lab: Stable.

## 2019-09-04 NOTE — LETTER
[address]  [facility] [address]  Phone: [phone]  Fax: [phone]     09/06/2019     To Whom It May Concern:    Deborah Nvaas is a 48 y.o. female patient hospitalized at [facility] since 9/4/19  for acute decompensated heart failure. She was seen for consideration for a VAD device. She has a diagnosis of non-ischemic cardiomyopathy. She is receiving continuous infusion of Dobutamine at 5 mcg/kg/min as inotropic life support measure. Her current status requires Heart Mate III for life support. She is at risk of sudden death, her symptoms remain despite tolerated medical treatment. She is severely impaired in her exercise tolerance with a NYHA Functional Class IV.     Most Recent Ejection Fraction:   EF 10%      We feel that with her current level of impairment, her risk of mortality over the next several weeks is excessive.  Additionally, her functional impairment makes her quality of life extremely poor.  We feel that her only option at this time, after thorough review of all her findings, is HeartMate III placement on 9/10/19 as destination therapy . We recommend VAD placement per our CTS as there are no contraindications.       Therefore, we request your URGENT REVIEW of this case and approval for funding of this procedure.      If we can be of any further assistance, please do not hesitate to contact us at the above address.    Sincerely,     Miriam Prieto M.D.  Medical Director, Mechanical Circulatory Device Support Program  Section of Cardiomyopathy & Heart Transplantation

## 2019-09-04 NOTE — Clinical Note
The PA catheter is repositioned to the right pulmonary artery. Hemodynamics performed. O2 saturation measured at 34%.

## 2019-09-04 NOTE — LETTER
[address]  [facility] [address]  Phone: [phone]  Fax: [phone]                                  09/20/2019  [facility] [address]  [place] [address]      To Whom It May Concern:    This letter is to inform you that one of our patients will be discharged to your community.  We want to let you know because this patient has special health needs.     This patient (whose information appears below) has a Heartmate 3 Left Ventricular Assist Device (LVAD) or blood pump.   The device is implanted inside the patients native heart.  It takes over pumping so that the patients organs and tissues get the oxygen-rich blood they need.  The VAD is a life-sustaining device.  The patients information is as follows:    Deborah Oliva Yosef  48 y.o. 1970  [address] [place] [address]    This device runs on batteries and on AC power.  If the patient should present to you during a medical emergency, please contact us immediately at ([phone]) and ask to page VAD coordinator on call for VAD-specific emergency instructions. THIS PATIENT CAN HAVE CHEST COMPRESSIONS if medically necessary and CAN BE Defibrillated/DC Cardioverted without disconnecting the controller. Please DO NOT use a DADA  Device.  This device is fluid dependent so  cc to start if required.   THIS PATIENT MAY NOT HAVE A BLOOD PRESSURE OR A PALPABLE PULSE.     If you have any question, please contact one of the VAD Coordinators at the numbers listed below or feel free to contact Dr. Miriam Prieto at [phone].      Thank you,        Nicole BAEZN, RN, CCRN  VAD Coordinator  [facility]  [address]  [place], [place] [id number] [phone] Office   [phone] Fax

## 2019-09-04 NOTE — INTERVAL H&P NOTE
The patient has been examined and the H&P has been reviewed:    Here for RHC to assess hemodynamics in pt being considered for VAD/OHT     RHC R IJ, 7 Fr sheath with local lidocaine, micropuncture kit and US guidance.    I have explained the risks, benefits and alternatives of the procedure in detail. The patient voices understanding and all questions have been answered,. The patient agrees to proceed as planned.          There are no hospital problems to display for this patient.

## 2019-09-04 NOTE — LETTER
[address]  [facility] [address]  Phone: [phone]  Fax: [phone]            09/23/2019        [facility] 's Office  [address] [place] [address]    To Whom It May Concern,     This letter is to inform you that we anticipate that one of our patients will be discharged into your community.  We want to let you know because this patient has special healthcare needs.      The patient (whose contact information appears below) has a Heartmate 3 Left Ventricular Assist Device (LVAD) or blood pump.  The device is surgically implanted alongside the patients native heart.  It takes over the pumping function of the patients sick or weakened heart so that the patients lungs, organs, and tissues get the oxygen-rich blood they need.  The LVAD is a life-sustaining device. The patients information is as follows:    Deborah Ortizford  48 y.o. 1970  [address] [place] [address]    This device runs on either AC power or external batteries.  The external batteries are worn in a double holster located on both the right and left side of the body.  (See photo). The holster cannot be removed.  The Heartmate  will be connected between the patient and wall unit or external batteries. THIS PATIENT MAY NOT HAVE A PALPABLE PULSE.    For additional information, visit [email or web address] for further VAD education materials.       Please contact one of the VAD coordinators with any further questions or concerns.  Kate De Los Santos RN, CCRN     [phone]   Suha Meraz RN--[phone]   SASHA De LeónN, RN, CCRN [phone]   IDALMIS Shafer, RN--[phone]   IDALMIS Jasso, RN, CCRN [phone]   VAD Office Fax: [phone]  Sincerely,      Miriam Prieto M.D.  Medical Director, Mechanical Circulatory Device Support Program  Section of Cardiomyopathy & Heart Transplantation

## 2019-09-04 NOTE — Clinical Note
The PA catheter is repositioned to the pulmonary wedge. Hemodynamics performed. O2 saturation measured at 93%.

## 2019-09-04 NOTE — NURSING
"Spoke with TATIANA FRANK in reference to pts admit orders.  The only orders pending was admit to inpatient and tele orders.  She informed me "that the admit orders were under a different reservation."  Spoke to admit office, pt was d/c from room 310 w/ csn no [id number].  Admit office was unable to place other csn number [id number] into system due to there were no orders in that particular reservation and gave the admit  an hard stop.  Pt was place back into the bed with previous csn number.  Notified [place] at [id number] in reference to pt's having no orders due to issues w/ reservation.  Awaiting orders.   "

## 2019-09-04 NOTE — LETTER
[address]  [place] [address]  Phone: [phone]  Fax: [phone]                    09/23/2019    [facility] [address]  [address] [place] [address]    To Whom It May Concern:    This letter is to inform you that one of our patients will be discharged into your community.  We want to let you know because this patient has special healthcare needs.   As a potential , you may appreciate knowing in advance how to respond to potential emergencies concerning this patient.    The patient (whose contact information appears below) has a Heartmate 3 Left Ventricular Assist System (LVAD) or blood pump.   The device is implanted alongside the patients native heart.  It takes over the pumping function of the patients sick or weakened heart so that the patients lungs, organs, and tissues get the oxygen-rich blood they need.  The LVAD is a life-sustaining device. The patients information is as follows:    Deborah Oliva Yosef  48 y.o. 1970  [address] [place] [address]    This device runs on batteries and AC power.   There is no hand-pumping.  There is a back up controller, batteries, battery charger and a Mobile power unit (MPU)/Power Module. If called to patient house, please take the emergency bag with the extra controller, batteries, and clips with patient to the hospital also, PAGE the VAD COORDINATOR on call immediately via the  [phone] for VAD-specific emergency instructions. This patient CAN have chest compressions and CAN be defibrillated/DC Cardioverted. Please DO NOT use a Yefri Device. This device is fluid dependent so you may need to administer  cc to start.  THIS PATIENT MAY NOT HAVE A BLOOD PRESSURE OR A PALPABLE PULSE.  For additional information, visit [email or web address] for further VAD education materials.       Please contact one of the VAD coordinators with any further questions or concerns.  Kate  Magali, RN, CCRN     [phone]   Suha Meraz, RN--[phone]   Nicole Jesus, SASHAN, RN, CCRN [phone]   SASHA ShaferN, RN--[phone]   IDALMIS Jasso, RN, CCRN [phone]   VAD Office Fax: [phone]  Sincerely,      Miriam Prieto M.D.  Medical Director, Mechanical Circulatory Device Support Program  Section of Cardiomyopathy & Heart Transplantation

## 2019-09-04 NOTE — LETTER
[address]  [facility] [address]  Phone: [phone]  Fax: [phone]                                  09/23/2019  [facility]  [address] [place] [address]      To Whom It May Concern:    This letter is to inform you that one of our patients will be discharged to your community.  We want to let you know because this patient has special health needs.     This patient (whose information appears below) has a Heartmate 3 Left Ventricular Assist Device (LVAD) or blood pump.   The device is implanted inside the patients native heart.  It takes over pumping so that the patients organs and tissues get the oxygen-rich blood they need.  The VAD is a life-sustaining device.  The patients information is as follows:    Deborah Navas  48 y.o. 1970  [address]  [facility] [address]    This device runs on batteries and on AC power.  If the patient should present to you during a medical emergency, please contact us immediately at ([phone]) and ask to page VAD coordinator on call for VAD-specific emergency instructions. THIS PATIENT CAN HAVE CHEST COMPRESSIONS if medically necessary and CAN BE Defibrillated/DC Cardioverted without disconnecting the controller. Please DO NOT use a DADA  Device.  This device is fluid dependent so  cc to start if required.   THIS PATIENT MAY NOT HAVE A BLOOD PRESSURE OR A PALPABLE PULSE.     For additional information, visit [email or web address] for further VAD education materials.       Please contact one of the VAD coordinators with any further questions or concerns.  Kate De Los Santos, RN, CCRN     [phone]   Suha Meraz RN--[phone]   SASHA De LeónN, RN, CCRN [phone]   IDALMIS Shafer, RN--[phone]   IDALMIS Jasso, RN, CCRN [phone]   VAD Office Fax: [phone]  Sincerely,      Miriam Prieto M.D.  Medical Director, Mechanical Circulatory Device Support Program  Section of  Cardiomyopathy & Heart Transplantation

## 2019-09-04 NOTE — H&P
[facility]  Heart Transplant  H&P    Patient Name: Deborah Navas  MRN: [id number]  Admission Date: (Not on file)  Attending Physician: Eric Antunez Jr.,*  Primary Care Provider: Primary Doctor No  Principal Problem:Acute on chronic combined systolic and diastolic heart failure    Subjective:     History of Present Illness:  49 yo female with hx NICM: EF 20%, LVEDD: 6.8cm, V-fib reported in setting of hypokalemia with appropriate shock from ICD (on Amiodarone), HLP, being admitted from procedure room for management of cardiogenic shock and workup for advanced options.  Patient initally say Dr. Zarate in clinic 8/27/19 as initial consult for advanced options.  At time, she reported feeling fine with only complaint of fatigue which was at baseline.         She had a RHC today which showed: RA: 20/ 20/ 18 RV: 60/ 8/ 18 PA: 60/ 32/ 45 PWP: 43/ 70/ 40 . Cardiac output was 2.27 by Fitz. Cardiac index is 1.04 L/min/m2. O2 Sat: PA 34%. AO 95% PVR 2.2 PALMER.  She is being admitted for diuresis, inotropic support (if she tolerates it from the arrhythmia standpoint), and work up for advanced options       Patient reports she feels fine today; she has been having a cold for the last 3 days but otherwise she has no new complaints.      Past Medical History:   Diagnosis Date    Fractures     Hyperlipidemia     Migraine     Osteoarthritis        Past Surgical History:   Procedure Laterality Date    BACK SURGERY      2007    BONE GRAFT Left     from Left hip to Left FA    eardrum reconstruction  1980    ELBOW SURGERY Left [id number]    FOREARM FRACTURE SURGERY Bilateral [id number]    multiple surgeries    INSERTION OF IMPLANTABLE CARDIOVERTER-DEFIBRILLATOR (ICD) GENERATOR WITH TWO EXISTING LEADS      INSERTION OF PACEMAKER Left     LUMBAR FUSION  2007    L4-L5    SINUS SURGERY Right 1994    with lymph nodes    TEMPOROMANDIBULAR JOINT SURGERY Right 1988    TONSILLECTOMY      TYMPANOSTOMY  TUBE PLACEMENT  1971- 1979    multiple tube placements       Review of patient's allergies indicates:   Allergen Reactions    Codeine Itching       No current facility-administered medications for this encounter.      No current outpatient medications on file.     Facility-Administered Medications Ordered in Other Encounters   Medication    lidocaine HCL 20 mg/ml (2%) injection    sodium chloride 0.9% irrigation     Family History     Problem Relation (Age of Onset)    Broken bones Maternal Grandmother    Cancer Paternal Grandmother    Dislocations Maternal Grandmother    Heart failure Paternal Grandfather, Maternal Grandfather    Migraines Mother, Maternal Grandmother, Paternal Grandmother, Paternal Grandfather, Maternal Grandfather    Obesity Paternal Grandmother    Osteoarthritis Mother, Maternal Grandmother, Paternal Grandmother, Paternal Grandfather, Maternal Grandfather, Father    Osteoporosis Maternal Grandmother    Scoliosis Maternal Grandmother    Stroke Father        Tobacco Use    Smoking status: Never Smoker    Smokeless tobacco: Never Used   Substance and Sexual Activity    Alcohol use: No    Drug use: No    Sexual activity: Not Currently     Review of Systems  Objective:     Vital Signs (Most Recent):    Vital Signs (24h Range):        No data found.  There is no height or weight on file to calculate BMI.    No intake or output data in the 24 hours ending 09/04/19 1507    Physical Exam   Constitutional: She is oriented to person, place, and time. Laying in bed NAD  Neck: Normal range of motion. Neck supple. JVD elevation to lower jawline present.   Cardiovascular: Normal rate and regular rhythm. Exam reveals no gallop and no friction rub. III/VI systolic murmur .  Pulmonary/Chest: Effort normal and breath sounds normal. She has no wheezes. She has no rales.   Abdominal: Soft. Bowel sounds are normal. There is no tenderness.   Musculoskeletal: She exhibits no edema.   Neurological: She is alert  and oriented to person, place, and time.   Skin: Skin is warm and dry.     Significant Labs:  CBC:  Recent Labs   Lab 09/04/19  0847   WBC 6.82   RBC 5.34   HGB 15.4   HCT 48.6*      MCV 91   MCH 28.8   MCHC 31.7*     BNP:  No results for input(s): BNP in the last 168 hours.    Invalid input(s): BNPTRIAGELBLO  CMP:  Recent Labs   Lab 09/04/19  0847      CALCIUM 10.4   ALBUMIN 3.7   PROT 8.1      K 4.8   CO2 27      BUN 35*   CREATININE 1.5*   ALKPHOS 76   ALT 21   AST 17   BILITOT 0.5      Coagulation:   Recent Labs   Lab 09/04/19  0847   INR 1.1     LDH:  No results for input(s): LDH in the last 72 hours.  Microbiology:  Microbiology Results (last 7 days)     ** No results found for the last 168 hours. **          I have reviewed all pertinent labs within the past 24 hours.    Diagnostic Results:  I have reviewed all pertinent imaging results/findings within the past 24 hours.    Assessment/Plan:     * Acute on chronic combined systolic and diastolic heart failure  -NICM  -Last 2D Echo done at outside facility.  EF: 20%, LVEDD: 6.8cm.  Getting repeat echo on admission  - RHC: done 9/4/19 RA: 20/ 20/ 18 RV: 60/ 8/ 18 PA: 60/ 32/ 45 PWP: 43/ 70/ 40 . Cardiac output was 2.27 by Fitz. Cardiac index is 1.04 L/min/m2. O2 Sat: PA 34%. AO 95% PVR 2.2 PALMER  -Will start low dose  at 2.5 and titrate as tolerated.  Will check electrolytes twice a day given hx of ICD shocks in setting of hypokalemia  -Hypervolemic on examination today; starting Lasix drip at 10mg/hr and will titrate if needed  -GDMT with Spironolactone  -Patient is currently being evaluated for OHTx/VAD  -Heart failure pathway Step 1  -2g Na dietary restriction, 1500 mL fluid restriction, strict I/Os      Ventricular tachyarrhythmia  -continued home dose of Amiodarone  -Monitor on Telemetry  -Maintain K around 4 and Mg around 2      Pulmonary hypertension due to left heart disease  -plan for diuresis with Lasix  infusion        ZAC Kelly  Heart Transplant  [facility]

## 2019-09-04 NOTE — LETTER
[address]  [facility] [address]  Phone: [phone]  Fax: [phone]                                  09/23/2019  [facility] [address]  [place] [address]      To Whom It May Concern:    This letter is to inform you that one of our patients will be discharged to your community.  We want to let you know because this patient has special health needs.     This patient (whose information appears below) has a Heartmate 3 Left Ventricular Assist Device (LVAD) or blood pump.   The device is implanted inside the patients native heart.  It takes over pumping so that the patients organs and tissues get the oxygen-rich blood they need.  The VAD is a life-sustaining device.  The patients information is as follows:    Deborah Oliva Yosef  48 y.o. 1970  [address] [place] [address]    This device runs on batteries and on AC power.  If the patient should present to you during a medical emergency, please contact us immediately at ([phone]) and ask to page VAD coordinator on call for VAD-specific emergency instructions. THIS PATIENT CAN HAVE CHEST COMPRESSIONS if medically necessary and CAN BE Defibrillated/DC Cardioverted without disconnecting the controller. Please DO NOT use a DADA  Device.  This device is fluid dependent so  cc to start if required.   THIS PATIENT MAY NOT HAVE A BLOOD PRESSURE OR A PALPABLE PULSE.     For additional information, visit [email or web address] for further VAD education materials.       Please contact one of the VAD coordinators with any further questions or concerns.  Kate De Los Santos, RN, CCRN     [phone]   Suha Meraz RN--[phone]   SASHA De LeónN, RN, CCRN [phone]   IDALMIS Shafer, RN--[phone]   IDALMIS Jasso, RN, CCRN [phone]   VAD Office Fax: [phone]  Sincerely,      Miriam Prieto M.D.  Medical Director, Mechanical Circulatory Device Support Program  Section of  Cardiomyopathy & Heart Transplantation

## 2019-09-04 NOTE — LETTER
[address]  [facility] [address]  Phone: [phone]  Fax: [phone]             09/23/2019  [address] [facility]  [address] [place] [address]  [place] [address]    To Whom It May Concern,     This letter is to inform you that we anticipate that one of our patients will be discharged into your community.  We want to let you know because this patient has special healthcare needs.      The patient (whose contact information appears below) has a Heartmate 3 Left Ventricular Assist Device (LVAD) or blood pump.  The device is surgically implanted alongside the patients native heart.  It takes over the pumping function of the patients sick or weakened heart so that the patients lungs, organs, and tissues get the oxygen-rich blood they need.  The LVAD is a life-sustaining device. The patients information is as follows:    Deborah Ortizford  48 y.o. 1970  [address] [facility] [place] [address]    This device runs on batteries and AC power.   There is no hand-pumping.  There is a back up controller, batteries, battery charger and a Mobile Power Unit (MPU)/Power Module. If called to patient house, please take the emergency bag with the extra controller, batteries, and clips with patient to the hospital.      PAGE the VAD COORDINATOR on call immediately via the  [phone] for VAD-specific emergency instructions. This patient CAN have chest compressions and CAN be defibrillated/DC Cardioverted. Please DO NOT use a Yefri Device. This device is fluid dependent so you may need to administer  cc to start.  THIS PATIENT MAY NOT HAVE A BLOOD PRESSURE OR A PALPABLE PULSE.     Please enter the patient information into your computer so that if patient calls 911 from their house, a message may appear on the screen that the patient has an LVAD and to follow all directions above.      For additional information, visit [email or web address] for further VAD education materials.        Please contact one of the VAD coordinators with any further questions or concerns.  Kate De Los Santos, RN, CCRN     [phone]   Suha Meraz, RN--[phone]   SASHA De LeónN, RN, CCRN [phone]   SASHA ShaferN, RN--[phone]   IDALMIS Jasso, RN, CCRN [phone]   VAD Office Fax: [phone]  Sincerely,      Miriam Prieto M.D.  Medical Director, Mechanical Circulatory Device Support Program  Section of Cardiomyopathy & Heart Transplantation

## 2019-09-04 NOTE — LETTER
[address]  [facility] [address]  Phone: [phone]  Fax: [phone]                                  09/23/2019  [facility] [address] [place] [address]      To Whom It May Concern:    This letter is to inform you that one of our patients will be discharged to your community.  We want to let you know because this patient has special health needs.     This patient (whose information appears below) has a Heartmate 3 Left Ventricular Assist Device (LVAD) or blood pump.   The device is implanted inside the patients native heart.  It takes over pumping so that the patients organs and tissues get the oxygen-rich blood they need.  The VAD is a life-sustaining device.  The patients information is as follows:    Deborah Ortizford  48 y.o. 1970  [address]  [place] [address]    This device runs on batteries and on AC power.  If the patient should present to you during a medical emergency, please contact us immediately at ([phone]) and ask to page VAD coordinator on call for VAD-specific emergency instructions. THIS PATIENT CAN HAVE CHEST COMPRESSIONS if medically necessary and CAN BE Defibrillated/DC Cardioverted without disconnecting the controller. Please DO NOT use a DADA  Device.  This device is fluid dependent so  cc to start if required.   THIS PATIENT MAY NOT HAVE A BLOOD PRESSURE OR A PALPABLE PULSE.     For additional information, visit [email or web address] for further VAD education materials.       Please contact one of the VAD coordinators with any further questions or concerns.  Kate De Los Santos, RN, CCRN     [phone]   Suha Meraz, RN--[phone]   SASHA De LeónN, RN, CCRN [phone]   IDALMIS Shafer, RN--[phone]   IDALMIS Jasso, RN, CCRN [phone]   VAD Office Fax: [phone]  Sincerely,      Miriam Prieto M.D.  Medical Director, Mechanical Circulatory Device Support Program  Section of  Cardiomyopathy & Heart Transplantation

## 2019-09-04 NOTE — LETTER
[address]  [facility] [address]  Phone: [phone]  Fax: [phone]           09/23/2019  Dr. Joaquin Del Toro  [address]  [place] [address]    Dear ,    One of your patients was implanted with a Heartmate 3 Left Ventricular Assist Device (LVAD).  This device takes over the pumping function of the native hearts left ventricle.    I am contacting you because this patient (whose information appears below) is about to be discharged from the hospital and return home. This patient has identified you as the referring cardiologist.   If the patient should present to you during a medical emergency, please call 911.    Deborah Navas  48 y.o. 1970  [address] [place] [address]                  I have included educational material regarding the LVAD for you and your staff.  This patient is fully trained to handle the LVAD properly.       For additional information, visit [email or web address] for further VAD education materials.       Please contact one of the VAD coordinators with any further questions or concerns.  Kate De Los Santos, RN, CCRN     [phone]   Suha Meraz, RN--[phone]   Nicole Jesus, SASHAN, RN, CCRN [phone]   Aimee Kelley, SASHAN, RN--[phone]   SASHA JassoN, RN, CCRN [phone]   VAD Office Fax: [phone]  Sincerely,      Miriam Prieto M.D.  Medical Director, Mechanical Circulatory Device Support Program  Section of Cardiomyopathy & Heart Transplantation

## 2019-09-04 NOTE — LETTER
[address]  [place] [address]  Phone: [phone]  Fax: [phone]                    09/23/2019    [facility]  [address] [place] [address]    To Whom It May Concern:    This letter is to inform you that one of our patients will be discharged into your community.  We want to let you know because this patient has special healthcare needs.   As a potential , you may appreciate knowing in advance how to respond to potential emergencies concerning this patient.    The patient (whose contact information appears below) has a Heartmate 3 Left Ventricular Assist System (LVAD) or blood pump.   The device is implanted alongside the patients native heart.  It takes over the pumping function of the patients sick or weakened heart so that the patients lungs, organs, and tissues get the oxygen-rich blood they need.  The LVAD is a life-sustaining device. The patients information is as follows:    Deborah Hazelalex Navas  48 y.o. 1970  [address] [place] [address]    This device runs on batteries and AC power.   There is no hand-pumping.  There is a back up controller, batteries, battery charger and a Mobile power unit (MPU)/Power Module. If called to patient house, please take the emergency bag with the extra controller, batteries, and clips with patient to the hospital also, PAGE the VAD COORDINATOR on call immediately via the  [phone] for VAD-specific emergency instructions. This patient CAN have chest compressions and CAN be defibrillated/DC Cardioverted. Please DO NOT use a Yefri Device. This device is fluid dependent so you may need to administer  cc to start.  THIS PATIENT MAY NOT HAVE A BLOOD PRESSURE OR A PALPABLE PULSE.  For additional information, visit [email or web address] for further VAD education materials.       Please contact one of the VAD coordinators with any further questions or concerns.  Kate  Magali, RN, CCRN     [phone]   Suha Meraz, RN--[phone]   Nicole Jesus, SASHAN, RN, CCRN [phone]   SASHA ShaferN, RN--[phone]   IDALMIS Jasso, RN, CCRN [phone]   VAD Office Fax: [phone]  Sincerely,      Miriam Prieto M.D.  Medical Director, Mechanical Circulatory Device Support Program  Section of Cardiomyopathy & Heart Transplantation

## 2019-09-04 NOTE — LETTER
[address]  [facility] [address]  Phone: [phone]  Fax: [phone]                      09/23/2019  [employer]  [address] [place] [address]      To Whom It May Concern:     This letter is to inform you that one of our patients will be discharged into your community.  We want to let you know because this patient has special healthcare needs.    The patient has a Heartmate 3 Left Ventricular Assist Device (LVAD) or blood pump.  The device takes over the pumping function of the patients sick or weakened heart so that the patients lungs, organs, and tissues get the oxygen-rich blood they need.  The LVAD is a life-sustaining device.    Although batteries can power the device short-term, its primary power source is AC power from an electrical outlet.  Therefore, we are requesting that the patient be put on a priority power restoration list in the event of an electrical power outage.  The contact information for this patient appears below:    Account Number: Meryl Martell   Name on the Account: [id number]    Patient: Deborah Ortizford  48 y.o. 1970  [address] [facility] [place] [address]       For additional information, visit [email or web address] for further VAD education materials.         If you have any questions about the device or its reliance on AC electricity, lease contact one of the VAD coordinators.  Kate De Los Santos RN, CCRN     [phone]   Suha Meraz RN--[phone]   IDALMIS De León, RN, CCRN [phone]   IDALMIS Shafer, RN--[phone]   IDALMIS Jasso, RN, CCRN [phone]   VAD Office Fax: [phone]  Sincerely,      Miriam Prieto M.D.  Medical Director, Mechanical Circulatory Device Support Program  Section of Cardiomyopathy & Heart Transplantation

## 2019-09-04 NOTE — LETTER
[address]  [facility] [address]  Phone: [phone]  Fax: [phone]                      09/23/2019  [employer]  [address] [place] [address]      To Whom It May Concern:     This letter is to inform you that one of our patients will be discharged into your community.  We want to let you know because this patient has special healthcare needs.    The patient has a Heartmate 3 Left Ventricular Assist Device (LVAD) or blood pump.  The device takes over the pumping function of the patients sick or weakened heart so that the patients lungs, organs, and tissues get the oxygen-rich blood they need.  The LVAD is a life-sustaining device.    Although batteries can power the device short-term, its primary power source is AC power from an electrical outlet.  Therefore, we are requesting that the patient be put on a priority power restoration list in the event of an electrical power outage.  The contact information for this patient appears below:    Account Number: Margaret B Joaquin  Name on the Account: [id number]    Patient: Deborah Navas  48 y.o. 1970  [address] [place] [address]. [place] [address]      For additional information, visit [email or web address] for further VAD education materials.         If you have any questions about the device or its reliance on AC electricity, lease contact one of the VAD coordinators.  Kate De Los Santos RN, CCRN     [phone]   Suha Meraz RN--[phone]   IDALMIS De León, RN, CCRN [phone]   IDALMIS Shafer, RN--[phone]   IDALMIS Jasso, RN, CCRN [phone]   VAD Office Fax: [phone]  Sincerely,      Miriam Prieto M.D.  Medical Director, Mechanical Circulatory Device Support Program  Section of Cardiomyopathy & Heart Transplantation

## 2019-09-04 NOTE — LETTER
[address]  [facility] [address]  Phone: [phone]  Fax: [phone]             09/23/2019  [address]  [address] [place] [address]  [place] [address]    To Whom It May Concern,     This letter is to inform you that we anticipate that one of our patients will be discharged into your community.  We want to let you know because this patient has special healthcare needs.      The patient (whose contact information appears below) has a Heartmate 3 Left Ventricular Assist Device (LVAD) or blood pump.  The device is surgically implanted alongside the patients native heart.  It takes over the pumping function of the patients sick or weakened heart so that the patients lungs, organs, and tissues get the oxygen-rich blood they need.  The LVAD is a life-sustaining device. The patients information is as follows:    Deborahtimbo Oliva Yosef  48 y.o. 1970  [address] [place] [address]    This device runs on batteries and AC power.   There is no hand-pumping.  There is a back up controller, batteries, battery charger and a Mobile Power Unit (MPU)/Power Module. If called to patient house, please take the emergency bag with the extra controller, batteries, and clips with patient to the hospital.      PAGE the VAD COORDINATOR on call immediately via the  [phone] for VAD-specific emergency instructions. This patient CAN have chest compressions and CAN be defibrillated/DC Cardioverted. Please DO NOT use a Yefri Device. This device is fluid dependent so you may need to administer  cc to start.  THIS PATIENT MAY NOT HAVE A BLOOD PRESSURE OR A PALPABLE PULSE.     Please enter the patient information into your computer so that if patient calls 911 from their house, a message may appear on the screen that the patient has an LVAD and to follow all directions above.      For additional information, visit [email or web address] for further VAD education materials.        Please contact one of the VAD coordinators with any further questions or concerns.  Kate De Los Santos, RN, CCRN     [phone]   Suha Meraz, RN--[phone]   SASHA De LeónN, RN, CCRN [phone]   SASHA ShaferN, RN--[phone]   IDALMIS Jasso, RN, CCRN [phone]   VAD Office Fax: [phone]  Sincerely,      Miriam Prieto M.D.  Medical Director, Mechanical Circulatory Device Support Program  Section of Cardiomyopathy & Heart Transplantation

## 2019-09-05 ENCOUNTER — TELEPHONE (OUTPATIENT)
Dept: TRANSPLANT | Facility: CLINIC | Age: 49
End: 2019-09-05

## 2019-09-05 PROBLEM — R93.89 ABNORMAL CT SCAN: Status: ACTIVE | Noted: 2019-09-05

## 2019-09-05 PROBLEM — E04.9 ENLARGED THYROID: Status: ACTIVE | Noted: 2019-09-05

## 2019-09-05 LAB
ABO + RH BLD: NORMAL
ALBUMIN SERPL BCP-MCNC: 3.9 G/DL (ref 3.5–5.2)
ALLENS TEST: ABNORMAL
ALP SERPL-CCNC: 76 U/L (ref 55–135)
ALT SERPL W/O P-5'-P-CCNC: 19 U/L (ref 10–44)
ANION GAP SERPL CALC-SCNC: 14 MMOL/L (ref 8–16)
ANION GAP SERPL CALC-SCNC: 14 MMOL/L (ref 8–16)
ASCENDING AORTA: 2.64 CM
AST SERPL-CCNC: 20 U/L (ref 10–40)
AV INDEX (PROSTH): 0.95
AV MEAN GRADIENT: 3 MMHG
AV PEAK GRADIENT: 5 MMHG
AV VALVE AREA: 2.98 CM2
AV VELOCITY RATIO: 1.12
BASOPHILS # BLD AUTO: 0.05 K/UL (ref 0–0.2)
BASOPHILS NFR BLD: 0.7 % (ref 0–1.9)
BILIRUB SERPL-MCNC: 0.6 MG/DL (ref 0.1–1)
BLD GP AB SCN CELLS X3 SERPL QL: NORMAL
BSA FOR ECHO PROCEDURE: 2.23 M2
BUN SERPL-MCNC: 34 MG/DL (ref 6–20)
BUN SERPL-MCNC: 34 MG/DL (ref 6–20)
CALCIUM SERPL-MCNC: 9.2 MG/DL (ref 8.7–10.5)
CALCIUM SERPL-MCNC: 9.5 MG/DL (ref 8.7–10.5)
CHLORIDE SERPL-SCNC: 101 MMOL/L (ref 95–110)
CHLORIDE SERPL-SCNC: 101 MMOL/L (ref 95–110)
CO2 SERPL-SCNC: 25 MMOL/L (ref 23–29)
CO2 SERPL-SCNC: 26 MMOL/L (ref 23–29)
CREAT SERPL-MCNC: 1.6 MG/DL (ref 0.5–1.4)
CREAT SERPL-MCNC: 1.8 MG/DL (ref 0.5–1.4)
CV ECHO LV RWT: 0.21 CM
DELSYS: ABNORMAL
DIFFERENTIAL METHOD: NORMAL
DOP CALC AO PEAK VEL: 1.08 M/S
DOP CALC AO VTI: 20.7 CM
DOP CALC LVOT AREA: 3.1 CM2
DOP CALC LVOT DIAMETER: 2 CM
DOP CALC LVOT PEAK VEL: 1.21 M/S
DOP CALC LVOT STROKE VOLUME: 61.64 CM3
DOP CALCLVOT PEAK VEL VTI: 19.63 CM
E WAVE DECELERATION TIME: 124.63 MSEC
E/A RATIO: 2.04
E/E' RATIO: 13.65 M/S
ECHO LV POSTERIOR WALL: 0.68 CM (ref 0.6–1.1)
EOSINOPHIL # BLD AUTO: 0.2 K/UL (ref 0–0.5)
EOSINOPHIL NFR BLD: 2.8 % (ref 0–8)
ERYTHROCYTE [DISTWIDTH] IN BLOOD BY AUTOMATED COUNT: 13.8 % (ref 11.5–14.5)
EST. GFR  (AFRICAN AMERICAN): 37.6 ML/MIN/1.73 M^2
EST. GFR  (AFRICAN AMERICAN): 43.3 ML/MIN/1.73 M^2
EST. GFR  (NON AFRICAN AMERICAN): 32.6 ML/MIN/1.73 M^2
EST. GFR  (NON AFRICAN AMERICAN): 37.6 ML/MIN/1.73 M^2
FRACTIONAL SHORTENING: 9 % (ref 28–44)
GLUCOSE SERPL-MCNC: 113 MG/DL (ref 70–110)
GLUCOSE SERPL-MCNC: 147 MG/DL (ref 70–110)
HCG INTACT+B SERPL-ACNC: 2.6 MIU/ML
HCO3 UR-SCNC: 27.2 MMOL/L (ref 24–28)
HCT VFR BLD AUTO: 42.5 % (ref 37–48.5)
HGB BLD-MCNC: 14 G/DL (ref 12–16)
IMM GRANULOCYTES # BLD AUTO: 0.02 K/UL (ref 0–0.04)
IMM GRANULOCYTES NFR BLD AUTO: 0.3 % (ref 0–0.5)
INTERVENTRICULAR SEPTUM: 0.47 CM (ref 0.6–1.1)
LA MAJOR: 6.04 CM
LA MINOR: 5.79 CM
LA WIDTH: 4.6 CM
LEFT ATRIUM SIZE: 4.24 CM
LEFT ATRIUM VOLUME INDEX: 45.4 ML/M2
LEFT ATRIUM VOLUME: 98.02 CM3
LEFT INTERNAL DIMENSION IN SYSTOLE: 5.93 CM (ref 2.1–4)
LEFT VENTRICLE DIASTOLIC VOLUME INDEX: 101.49 ML/M2
LEFT VENTRICLE DIASTOLIC VOLUME: 218.9 ML
LEFT VENTRICLE MASS INDEX: 68 G/M2
LEFT VENTRICLE SYSTOLIC VOLUME INDEX: 81.3 ML/M2
LEFT VENTRICLE SYSTOLIC VOLUME: 175.28 ML
LEFT VENTRICULAR INTERNAL DIMENSION IN DIASTOLE: 6.54 CM (ref 3.5–6)
LEFT VENTRICULAR MASS: 146.23 G
LV LATERAL E/E' RATIO: 11.6 M/S
LV SEPTAL E/E' RATIO: 16.57 M/S
LYMPHOCYTES # BLD AUTO: 1.5 K/UL (ref 1–4.8)
LYMPHOCYTES NFR BLD: 21.8 % (ref 18–48)
MAGNESIUM SERPL-MCNC: 2.1 MG/DL (ref 1.6–2.6)
MAGNESIUM SERPL-MCNC: 2.1 MG/DL (ref 1.6–2.6)
MCH RBC QN AUTO: 29 PG (ref 27–31)
MCHC RBC AUTO-ENTMCNC: 32.9 G/DL (ref 32–36)
MCV RBC AUTO: 88 FL (ref 82–98)
MODE: ABNORMAL
MONOCYTES # BLD AUTO: 0.5 K/UL (ref 0.3–1)
MONOCYTES NFR BLD: 7.9 % (ref 4–15)
MV PEAK A VEL: 0.57 M/S
MV PEAK E VEL: 1.16 M/S
NEUTROPHILS # BLD AUTO: 4.5 K/UL (ref 1.8–7.7)
NEUTROPHILS NFR BLD: 66.5 % (ref 38–73)
NRBC BLD-RTO: 0 /100 WBC
PCO2 BLDA: 41.4 MMHG (ref 35–45)
PH SMN: 7.43 [PH] (ref 7.35–7.45)
PHOSPHATE SERPL-MCNC: 4.3 MG/DL (ref 2.7–4.5)
PISA TR MAX VEL: 3.07 M/S
PLATELET # BLD AUTO: 303 K/UL (ref 150–350)
PMV BLD AUTO: 9.8 FL (ref 9.2–12.9)
PO2 BLDA: 28 MMHG (ref 40–60)
POC BE: 3 MMOL/L
POC SATURATED O2: 55 % (ref 95–100)
POC TCO2: 28 MMOL/L (ref 24–29)
POTASSIUM SERPL-SCNC: 3.3 MMOL/L (ref 3.5–5.1)
POTASSIUM SERPL-SCNC: 3.8 MMOL/L (ref 3.5–5.1)
PROT SERPL-MCNC: 7.7 G/DL (ref 6–8.4)
PULM VEIN S/D RATIO: 0.98
PV PEAK D VEL: 0.61 M/S
PV PEAK S VEL: 0.6 M/S
RA MAJOR: 5.6 CM
RA PRESSURE: 3 MMHG
RA WIDTH: 4.67 CM
RBC # BLD AUTO: 4.82 M/UL (ref 4–5.4)
RIGHT VENTRICULAR END-DIASTOLIC DIMENSION: 4.49 CM
SAMPLE: ABNORMAL
SINUS: 2.99 CM
SITE: ABNORMAL
SODIUM SERPL-SCNC: 140 MMOL/L (ref 136–145)
SODIUM SERPL-SCNC: 141 MMOL/L (ref 136–145)
STJ: 2.9 CM
TDI LATERAL: 0.1 M/S
TDI SEPTAL: 0.07 M/S
TDI: 0.09 M/S
TR MAX PG: 38 MMHG
TRICUSPID ANNULAR PLANE SYSTOLIC EXCURSION: 3.34 CM
TV REST PULMONARY ARTERY PRESSURE: 41 MMHG
WBC # BLD AUTO: 6.8 K/UL (ref 3.9–12.7)

## 2019-09-05 PROCEDURE — 86850 RBC ANTIBODY SCREEN: CPT | Mod: NTX

## 2019-09-05 PROCEDURE — 83735 ASSAY OF MAGNESIUM: CPT | Mod: NTX

## 2019-09-05 PROCEDURE — 85025 COMPLETE CBC W/AUTO DIFF WBC: CPT | Mod: NTX

## 2019-09-05 PROCEDURE — 80053 COMPREHEN METABOLIC PANEL: CPT | Mod: NTX

## 2019-09-05 PROCEDURE — 80048 BASIC METABOLIC PNL TOTAL CA: CPT | Mod: NTX

## 2019-09-05 PROCEDURE — 99232 SBSQ HOSP IP/OBS MODERATE 35: CPT | Mod: NTX,,, | Performed by: INTERNAL MEDICINE

## 2019-09-05 PROCEDURE — 99232 PR SUBSEQUENT HOSPITAL CARE,LEVL II: ICD-10-PCS | Mod: NTX,,, | Performed by: INTERNAL MEDICINE

## 2019-09-05 PROCEDURE — 84702 CHORIONIC GONADOTROPIN TEST: CPT | Mod: NTX

## 2019-09-05 PROCEDURE — 20600001 HC STEP DOWN PRIVATE ROOM: Mod: NTX

## 2019-09-05 PROCEDURE — 25000003 PHARM REV CODE 250: Mod: NTX | Performed by: STUDENT IN AN ORGANIZED HEALTH CARE EDUCATION/TRAINING PROGRAM

## 2019-09-05 PROCEDURE — 63600175 PHARM REV CODE 636 W HCPCS: Mod: NTX | Performed by: STUDENT IN AN ORGANIZED HEALTH CARE EDUCATION/TRAINING PROGRAM

## 2019-09-05 PROCEDURE — 25000003 PHARM REV CODE 250: Mod: NTX | Performed by: INTERNAL MEDICINE

## 2019-09-05 PROCEDURE — 25000003 PHARM REV CODE 250: Mod: NTX | Performed by: PHYSICIAN ASSISTANT

## 2019-09-05 PROCEDURE — 94760 N-INVAS EAR/PLS OXIMETRY 1: CPT | Mod: NTX

## 2019-09-05 PROCEDURE — 63600175 PHARM REV CODE 636 W HCPCS: Mod: NTX | Performed by: PHYSICIAN ASSISTANT

## 2019-09-05 PROCEDURE — 99900035 HC TECH TIME PER 15 MIN (STAT): Mod: NTX

## 2019-09-05 PROCEDURE — 63600175 PHARM REV CODE 636 W HCPCS: Mod: NTX | Performed by: INTERNAL MEDICINE

## 2019-09-05 PROCEDURE — 84100 ASSAY OF PHOSPHORUS: CPT | Mod: NTX

## 2019-09-05 RX ORDER — ZOLPIDEM TARTRATE 5 MG/1
10 TABLET ORAL NIGHTLY PRN
Status: DISCONTINUED | OUTPATIENT
Start: 2019-09-05 | End: 2019-09-10

## 2019-09-05 RX ORDER — POTASSIUM CHLORIDE 14.9 MG/ML
20 INJECTION INTRAVENOUS ONCE
Status: COMPLETED | OUTPATIENT
Start: 2019-09-05 | End: 2019-09-05

## 2019-09-05 RX ORDER — ISOSORBIDE DINITRATE 20 MG/1
20 TABLET ORAL EVERY 8 HOURS
Status: DISCONTINUED | OUTPATIENT
Start: 2019-09-05 | End: 2019-09-09

## 2019-09-05 RX ORDER — POTASSIUM CHLORIDE 20 MEQ/1
60 TABLET, EXTENDED RELEASE ORAL ONCE
Status: COMPLETED | OUTPATIENT
Start: 2019-09-05 | End: 2019-09-05

## 2019-09-05 RX ORDER — PROMETHAZINE HYDROCHLORIDE 25 MG/1
25 TABLET ORAL EVERY 6 HOURS PRN
Status: DISCONTINUED | OUTPATIENT
Start: 2019-09-05 | End: 2019-09-10

## 2019-09-05 RX ORDER — VENLAFAXINE 37.5 MG/1
150 TABLET ORAL DAILY
Status: DISCONTINUED | OUTPATIENT
Start: 2019-09-05 | End: 2019-09-10

## 2019-09-05 RX ORDER — OXYCODONE AND ACETAMINOPHEN 5; 325 MG/1; MG/1
1 TABLET ORAL EVERY 4 HOURS PRN
Status: DISCONTINUED | OUTPATIENT
Start: 2019-09-05 | End: 2019-09-10

## 2019-09-05 RX ORDER — HYDRALAZINE HYDROCHLORIDE 10 MG/1
10 TABLET, FILM COATED ORAL EVERY 8 HOURS
Status: DISCONTINUED | OUTPATIENT
Start: 2019-09-05 | End: 2019-09-06

## 2019-09-05 RX ADMIN — AMIODARONE HYDROCHLORIDE 200 MG: 200 TABLET ORAL at 09:09

## 2019-09-05 RX ADMIN — ISOSORBIDE DINITRATE 20 MG: 20 TABLET ORAL at 09:09

## 2019-09-05 RX ADMIN — GABAPENTIN 400 MG: 400 CAPSULE ORAL at 05:09

## 2019-09-05 RX ADMIN — POTASSIUM CHLORIDE 40 MEQ: 20 TABLET, EXTENDED RELEASE ORAL at 09:09

## 2019-09-05 RX ADMIN — VENLAFAXINE 150 MG: 37.5 TABLET ORAL at 09:09

## 2019-09-05 RX ADMIN — POTASSIUM CHLORIDE 60 MEQ: 20 TABLET, EXTENDED RELEASE ORAL at 08:09

## 2019-09-05 RX ADMIN — HEPARIN SODIUM 5000 UNITS: 5000 INJECTION, SOLUTION INTRAVENOUS; SUBCUTANEOUS at 05:09

## 2019-09-05 RX ADMIN — ATORVASTATIN CALCIUM 40 MG: 20 TABLET, FILM COATED ORAL at 09:09

## 2019-09-05 RX ADMIN — FUROSEMIDE 10 MG/HR: 10 INJECTION, SOLUTION INTRAMUSCULAR; INTRAVENOUS at 09:09

## 2019-09-05 RX ADMIN — ISOSORBIDE DINITRATE 20 MG: 20 TABLET ORAL at 05:09

## 2019-09-05 RX ADMIN — ASPIRIN 81 MG: 81 TABLET, COATED ORAL at 09:09

## 2019-09-05 RX ADMIN — HYDRALAZINE HYDROCHLORIDE 10 MG: 10 TABLET, FILM COATED ORAL at 09:09

## 2019-09-05 RX ADMIN — HEPARIN SODIUM 5000 UNITS: 5000 INJECTION, SOLUTION INTRAVENOUS; SUBCUTANEOUS at 09:09

## 2019-09-05 RX ADMIN — DOBUTAMINE IN DEXTROSE 5 MCG/KG/MIN: 200 INJECTION, SOLUTION INTRAVENOUS at 08:09

## 2019-09-05 RX ADMIN — ZOLPIDEM TARTRATE 10 MG: 5 TABLET ORAL at 10:09

## 2019-09-05 RX ADMIN — GABAPENTIN 400 MG: 400 CAPSULE ORAL at 09:09

## 2019-09-05 RX ADMIN — DOCUSATE SODIUM 100 MG: 100 CAPSULE, LIQUID FILLED ORAL at 09:09

## 2019-09-05 RX ADMIN — HYDRALAZINE HYDROCHLORIDE 10 MG: 10 TABLET, FILM COATED ORAL at 05:09

## 2019-09-05 RX ADMIN — POTASSIUM CHLORIDE 20 MEQ: 200 INJECTION, SOLUTION INTRAVENOUS at 08:09

## 2019-09-05 RX ADMIN — SPIRONOLACTONE 25 MG: 25 TABLET, FILM COATED ORAL at 09:09

## 2019-09-05 NOTE — PLAN OF CARE
Problem: Adult Inpatient Plan of Care  Goal: Plan of Care Review  Outcome: Ongoing (interventions implemented as appropriate)  Plan of care discussed with patient.  Patient ambulating independently, fall precautions in place. Patient has no complaints of pain. Discussed medications and care. Lasix still running at 10 mg/hr.  increased to 5 mcg. Started on hydralazine and Isosorbide. Echo completed. CTS consulted. Patient has no questions at this time. Will continue to monitor.

## 2019-09-05 NOTE — ASSESSMENT & PLAN NOTE
-NICM  -Last 2D Echo done at outside facility.  EF: 20%, LVEDD: 6.8cm. Repeat echo ordered and pending  - RHC: done on admit 9/4/19 RA: 20/ 20/ 18 RV: 60/ 8/ 18 PA: 60/ 32/ 45 PWP: 43/ 70/ 40 . Cardiac output was 2.27 by Fitz. Cardiac index is 1.04 L/min/m2. O2 Sat: PA 34%. AO 95% PVR 2.2 PALMER  -Started on low dose  at 2.5 and titrated up to 5 this am.  Checking electrolytes twice a day given hx of ICD shocks in setting of hypokalemia  -SVR is elevated at 1400 so will add Hydralazine and Isordil today  -Hypervolemic on examination today; starting Lasix drip at 10mg/hr and will titrate if needed  -GDMT with Spironolactone; adding Hydralazine and Isordil today  -Patient is currently being evaluated for OHTx/VAD  -Heart failure pathway Step 1  -2g Na dietary restriction, 1500 mL fluid restriction, strict I/Os

## 2019-09-05 NOTE — PROGRESS NOTES
[facility]  Heart Transplant  Progress Note    Patient Name: Deborah Navas  MRN: [id number]  Admission Date: 9/4/2019  Hospital Length of Stay: 1 days  Attending Physician: Vi Bryant MD  Primary Care Provider: Primary Doctor No  Principal Problem:Acute on chronic combined systolic and diastolic heart failure    Subjective:     Interval History: Patient started on Dobutamine and Lasix yesterday with good response.  She is tolerating Dobutamine without side effects.  CT of head and chest done as part of pathway.  She is net negative 1.3L in the last 24 hours.  CVP: 6, SVO2: 55, CO: 4.49, CI: 2.01 and SVR: 1478 on 2.5 of Dobutamine using Oxygen consumption of 362.  2D echo ordered and pending for today    Lines:  Right IJ: 9/4/19    Continuous Infusions:   DOBUTamine 2.5 mcg/kg/min (09/04/19 2001)    furosemide (LASIX) 2 mg/mL infusion (non-titrating) 10 mg/hr (09/05/19 0903)     Scheduled Meds:   amiodarone  200 mg Oral BID    aspirin  81 mg Oral Daily    atorvastatin  40 mg Oral Daily    docusate sodium  100 mg Oral Daily    gabapentin  400 mg Oral TID    heparin (porcine)  5,000 Units Subcutaneous Q8H    potassium chloride  40 mEq Oral Daily    spironolactone  25 mg Oral Daily    venlafaxine  150 mg Oral Daily     PRN Meds:diphenhydrAMINE, oxyCODONE-acetaminophen, promethazine, sodium chloride 0.9%, zolpidem    Review of patient's allergies indicates:   Allergen Reactions    Codeine Itching     Objective:     Vital Signs (Most Recent):  Temp: 97.9 °F (36.6 °C) (09/05/19 0725)  Pulse: 73 (09/05/19 0831)  Resp: 16 (09/05/19 0813)  BP: 111/76 (09/05/19 0725)  SpO2: 98 % (09/05/19 0813) Vital Signs (24h Range):  Temp:  [97.9 °F (36.6 °C)-98.6 °F (37 °C)] 97.9 °F (36.6 °C)  Pulse:  [64-88] 73  Resp:  [16-18] 16  SpO2:  [95 %-99 %] 98 %  BP: (104-125)/(53-79) 111/76     Patient Vitals for the past 72 hrs (Last 3 readings):   Weight   09/05/19 0700 103.7 kg (228 lb 9.9 oz)    09/04/19 1700 105 kg (231 lb 7.7 oz)     Body mass index is 34.76 kg/m².      Intake/Output Summary (Last 24 hours) at 9/5/2019 1049  Last data filed at 9/5/2019 0417  Gross per 24 hour   Intake 570 ml   Output 1900 ml   Net -1330 ml       Hemodynamic Parameters:  CVP:  [6 mmHg] 6 mmHg    Telemetry: reviewed  Physical Exam   Constitutional: She is oriented to person, place, and time. Laying in bed NAD  Neck: Normal range of motion. Neck supple. JVD elevation  just above the clavicle   Cardiovascular: Normal rate and regular rhythm. Exam reveals no gallop and no friction rub. III/VI systolic murmur .  Pulmonary/Chest: Effort normal and breath sounds normal. She has no wheezes. She has no rales.   Abdominal: Soft. Bowel sounds are normal. There is no tenderness.   Musculoskeletal: She exhibits no edema.   Neurological: She is alert and oriented to person, place, and time.   Skin: Skin is warm and dry    Significant Labs:  CBC:  Recent Labs   Lab 09/04/19  0847 09/05/19  0432   WBC 6.82 6.80   RBC 5.34 4.82   HGB 15.4 14.0   HCT 48.6* 42.5    303   MCV 91 88   MCH 28.8 29.0   MCHC 31.7* 32.9     BNP:  No results for input(s): BNP in the last 168 hours.    Invalid input(s): BNPTRIAGELBLO  CMP:  Recent Labs   Lab 09/04/19  0847 09/04/19 2007 09/05/19 0432    113* 113*   CALCIUM 10.4 9.7 9.5   ALBUMIN 3.7  --  3.9   PROT 8.1  --  7.7    140 140   K 4.8 4.3 3.8   CO2 27 26 25    103 101   BUN 35* 35* 34*   CREATININE 1.5* 1.5* 1.6*   ALKPHOS 76  --  76   ALT 21  --  19   AST 17  --  20   BILITOT 0.5  --  0.6      Coagulation:   Recent Labs   Lab 09/04/19  0847   INR 1.1     LDH:  No results for input(s): LDH in the last 72 hours.  Microbiology:  Microbiology Results (last 7 days)     ** No results found for the last 168 hours. **          I have reviewed all pertinent labs within the past 24 hours.    Estimated Creatinine Clearance: 53.6 mL/min (A) (based on SCr of 1.6 mg/dL  (H)).    Diagnostic Results:  I have reviewed all pertinent imaging results/findings within the past 24 hours.    Assessment and Plan:     No notes on file    * Acute on chronic combined systolic and diastolic heart failure  -NICM  -Last 2D Echo done at outside facility.  EF: 20%, LVEDD: 6.8cm. Repeat echo ordered and pending  - RHC: done on admit 9/4/19 RA: 20/ 20/ 18 RV: 60/ 8/ 18 PA: 60/ 32/ 45 PWP: 43/ 70/ 40 . Cardiac output was 2.27 by Fitz. Cardiac index is 1.04 L/min/m2. O2 Sat: PA 34%. AO 95% PVR 2.2 PALMER  -Started on low dose  at 2.5 and titrated up to 5 this am.  Checking electrolytes twice a day given hx of ICD shocks in setting of hypokalemia  -SVR is elevated at 1400 so will add Hydralazine and Isordil today  -Hypervolemic on examination today; starting Lasix drip at 10mg/hr and will titrate if needed  -GDMT with Spironolactone; adding Hydralazine and Isordil today  -Patient is currently being evaluated for OHTx/VAD  -Heart failure pathway Step 1  -2g Na dietary restriction, 1500 mL fluid restriction, strict I/Os    Ventricular tachyarrhythmia  -continued home dose of Amiodarone  -Monitor on Telemetry  -Maintain K around 4 and Mg around 2    Pulmonary hypertension due to left heart disease  -plan for diuresis with Lasix infusion    Enlarged thyroid  -found incidentally on CT- will get ultrasound and check TSH and FT4    Abnormal CT scan  -focal opacity found at right base.  DDx: atelectasis.  Recommending repeat CT in 3 months        ZAC Kelly  Heart Transplant  [facility]

## 2019-09-05 NOTE — SUBJECTIVE & OBJECTIVE
Interval History: Patient started on Dobutamine and Lasix yesterday with good response.  She is tolerating Dobutamine without side effects.  CT of head and chest done as part of pathway.  She is net negative 1.3L in the last 24 hours.  CVP: 6, SVO2: 55, CO: 4.49, CI: 2.01 and SVR: 1478 on 2.5 of Dobutamine using Oxygen consumption of 362.  2D echo ordered and pending for today    Lines:  Right IJ: 9/4/19    Continuous Infusions:   DOBUTamine 2.5 mcg/kg/min (09/04/19 2001)    furosemide (LASIX) 2 mg/mL infusion (non-titrating) 10 mg/hr (09/05/19 0903)     Scheduled Meds:   amiodarone  200 mg Oral BID    aspirin  81 mg Oral Daily    atorvastatin  40 mg Oral Daily    docusate sodium  100 mg Oral Daily    gabapentin  400 mg Oral TID    heparin (porcine)  5,000 Units Subcutaneous Q8H    potassium chloride  40 mEq Oral Daily    spironolactone  25 mg Oral Daily    venlafaxine  150 mg Oral Daily     PRN Meds:diphenhydrAMINE, oxyCODONE-acetaminophen, promethazine, sodium chloride 0.9%, zolpidem    Review of patient's allergies indicates:   Allergen Reactions    Codeine Itching     Objective:     Vital Signs (Most Recent):  Temp: 97.9 °F (36.6 °C) (09/05/19 0725)  Pulse: 73 (09/05/19 0831)  Resp: 16 (09/05/19 0813)  BP: 111/76 (09/05/19 0725)  SpO2: 98 % (09/05/19 0813) Vital Signs (24h Range):  Temp:  [97.9 °F (36.6 °C)-98.6 °F (37 °C)] 97.9 °F (36.6 °C)  Pulse:  [64-88] 73  Resp:  [16-18] 16  SpO2:  [95 %-99 %] 98 %  BP: (104-125)/(53-79) 111/76     Patient Vitals for the past 72 hrs (Last 3 readings):   Weight   09/05/19 0700 103.7 kg (228 lb 9.9 oz)   09/04/19 1700 105 kg (231 lb 7.7 oz)     Body mass index is 34.76 kg/m².      Intake/Output Summary (Last 24 hours) at 9/5/2019 1049  Last data filed at 9/5/2019 0417  Gross per 24 hour   Intake 570 ml   Output 1900 ml   Net -1330 ml       Hemodynamic Parameters:  CVP:  [6 mmHg] 6 mmHg    Telemetry: reviewed  Physical Exam   Constitutional: She is oriented to  person, place, and time. Laying in bed NAD  Neck: Normal range of motion. Neck supple. JVD elevation just above the clavicle   Cardiovascular: Normal rate and regular rhythm. Exam reveals no gallop and no friction rub. III/VI systolic murmur .  Pulmonary/Chest: Effort normal and breath sounds normal. She has no wheezes. She has no rales.   Abdominal: Soft. Bowel sounds are normal. There is no tenderness.   Musculoskeletal: She exhibits no edema.   Neurological: She is alert and oriented to person, place, and time.   Skin: Skin is warm and dry    Significant Labs:  CBC:  Recent Labs   Lab 09/04/19 0847 09/05/19 0432   WBC 6.82 6.80   RBC 5.34 4.82   HGB 15.4 14.0   HCT 48.6* 42.5    303   MCV 91 88   MCH 28.8 29.0   MCHC 31.7* 32.9     BNP:  No results for input(s): BNP in the last 168 hours.    Invalid input(s): BNPTRIAGELBLO  CMP:  Recent Labs   Lab 09/04/19  0847 09/04/19 2007 09/05/19 0432    113* 113*   CALCIUM 10.4 9.7 9.5   ALBUMIN 3.7  --  3.9   PROT 8.1  --  7.7    140 140   K 4.8 4.3 3.8   CO2 27 26 25    103 101   BUN 35* 35* 34*   CREATININE 1.5* 1.5* 1.6*   ALKPHOS 76  --  76   ALT 21  --  19   AST 17  --  20   BILITOT 0.5  --  0.6      Coagulation:   Recent Labs   Lab 09/04/19 0847   INR 1.1     LDH:  No results for input(s): LDH in the last 72 hours.  Microbiology:  Microbiology Results (last 7 days)     ** No results found for the last 168 hours. **          I have reviewed all pertinent labs within the past 24 hours.    Estimated Creatinine Clearance: 53.6 mL/min (A) (based on SCr of 1.6 mg/dL (H)).    Diagnostic Results:  I have reviewed all pertinent imaging results/findings within the past 24 hours.

## 2019-09-05 NOTE — PROGRESS NOTES
"Admit Note     Met with patient to assess needs. Patient is a 48 y.o. single female, admitted for heart failure, ventricular tachyarrhythmia, pulmonary hypertension and pathway.        Patient admitted to [facility]  on 9/4/2019 .  At this time, patient presents as alert and oriented x 4, good eye contact, calm, communicative and asking and answering questions appropriately.  At this time, patients caregiver is not in attendance.     Household/Family Systems     Patient resides alone at     [address]  [place] [address].      Support system includes mother, sister and very good friend.     Patient does not have dependents that are need of being cared for.   Pt does not have any children.      Patients primary caregiver is self with support from friends and family when needed.     Pt's cell:  [phone]    Emergency contacts:  Cristal Navas (mother,lives in [place], [place] -5 hours from pt, works part time and drives) [phone]  Flavia Nuñez (best friend "like a sister", lives in [place], Works full time and drives) [phone]  Pt reports she has a sister who also lives in [place].     During admission, patient's caregiver plans to visit.  Confirmed patient and patients caregivers do have access to reliable transportation.  The pt is currently NOT driving due to medical issues.  Pt reports she has not driven since August 1st, 2019.  Pt receives transportation assistance from her friend Moose.     Cognitive Status/Learning     Patient reports reading ability as college. Pt reports she has a VANDA.  Pt  states patient does not have difficulty with reading, writing, seeing and hearing. Pt uses reading glasses.  The pt reports she is having difficulty with learning, comprehending and short term memory.  Pt reports she is having trouble absorbing  and retaining new information.  Pt reports no issues with long term memory.      Patient reports patient learns best by visual.      Needed: " No.   Highest education level: Post-College Graduate Degree    Vocation/Disability   .  Working for Income: yes  If yes, working activity level: Full time/part time  Patient was working full time until  and now the pt is working 30 hours a week.  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     Adherence     Patient reports a high level of adherence to patients health care regimen.  Adherence counseling and education provided. Patient verbalizes understanding.    Substance Use    Patient reports the following substance usage.    Tobacco: none, patient denies any use.  Alcohol: none, patient denies any use.  Illicit Drugs/Non-prescribed Medications: none, patient denies any use.  Patient states clear understanding of the potential impact of substance use.  Substance abstinence/cessation counseling, education and resources provided and reviewed.     Services Utilizing/ADLS    Infusion Service: Prior to admission, patient utilizing? no  Home Health: Prior to admission, patient utilizing? no  DME: Prior to admission, no  Pulmonary/Cardiac Rehab: Prior to admission, yes, pt participated in cardiac rehab in the past/ended May 2019.   Dialysis:  Prior to admission, no  Transplant Specialty Pharmacy:  Prior to admission, no.    Prior to admission, patient reports patient was independent with ADLS and was not driving.  Patient reports patient is not able to care for self at this time due to compromised medical condition (as documented in medical record) and physical weakness..  Patient indicates a willingness to care for self once medically cleared to do so.    Insurance/Medications    Insured by   Payor/Plan Subscr  Sex Relation Sub. Ins. ID Effective Group Num   1. [facility]* VILLARREALYON BLAIR* 1970 Female  [id number]* [id number]                                   [address] [address]      Primary Insurance (for UNOS reporting):  Private Insurance  Secondary Insurance (for UNOS reporting): None    Patient reports patient is able to obtain and afford medications at this time and at time of discharge.  Pt reports she is not able to afford medications with a very high co-pay.     Living Will/Healthcare Power of     Patient states patient does not have a LW and/or HCPA.   provided education regarding LW and HCPA and the completion of forms.    Coping/Mental Health    Patient is coping adequately with the aid of  family members and friends.   Patient indicates mental health difficulties.   Pt reports difficulty with depression due to to medical needs and less interaction with work and friends.  Pt reports she has not spoken to a doctor about her feelings of depression.  Pt reports she does have trouble sleeping and takes Ambien daily.    The pt reports her mom, sister and best friend are very supportive.    At this time the pt is not sure who will be her caregiver for advanced  options. The pt is thinking about living with her mother in [place], [place] (close to [place]). Worker encouraged pt to talk to family about caregiver needs.  Worker also explained need to meet with pt's caregiver as part of the pathway.  Worker provided emotional support.     Discharge Planning    At time of discharge, patient plans to return to patient's home under the care of self.  Patients mother or friend will transport patient.  Per rounds today, expected discharge date has not been medically determined at this time. Patient and patients caregiver  verbalize understanding and are involved in treatment planning and discharge process.    Additional Concerns    Patient is being followed for needs, education, resources, information, emotional support, supportive counseling, and for supportive and skilled discharge plan of care.  providing ongoing psychosocial support, education, resources and d/c planning as needed.  SW remains  available. Patient verbalizes understanding and agreement with information reviewed, social work availability, and how to access available resources as needed.

## 2019-09-05 NOTE — LETTER
2019    RE: Deborah Navas         MRN  [id number]          [birth year]             To Whom It May Concern:    Ms. Deborah Navas is a 48 y.o. year-old female patient at [facility] and was seen for consideration for cardiac transplantation and LVAD implant.  She has a diagnosis of idiopathic cardiomyopathy.  She is impaired in her exercise tolerance with a NYHA Functional Class III, an ejection fraction of 20%, and is currently hospitalized on a continuous infusion of lasix and dobutamine.     We feel that her functional impairment and lack of advanced options make her quality of life poor.  We feel that her only option at this time, after thorough review of all of her findings, is cardiac transplantation and/or LVAD implant.  Therefore, we request your review of this case and approval for a cardiac transplant / LVAD evaluation.    If we can be of any further assistance, please do not hesitate to contact us at the above address.    Sincerely,            Eric Antunez Jr, MD, [facility]  Medical Director, Cardiomyopathy and Heart Failure Program

## 2019-09-05 NOTE — PLAN OF CARE
Problem: Adult Inpatient Plan of Care  Goal: Plan of Care Review  Outcome: Ongoing (interventions implemented as appropriate)  Patient remained free of injuries, falls and trauma. VS stable. No complaints of pain. Lasix and Dobutamine initiated. Frequent checks completed per orders. Strict I/O being monitored. Falls risk maintained. Echo ordered. Plan of care reviewed with pt. Pt verbalized understanding. All questions and concerns addressed. Will continue to monitor.

## 2019-09-05 NOTE — NURSING TRANSFER
9/5/2019      Transfer from: Echo     Transfer via stretcher     Transfer with cardiac monitoring, IV pole     Transported by transport     Chart send with patient: No

## 2019-09-05 NOTE — PROGRESS NOTES
Patient returned back from CT. Dobutamine and Lasix still infusing. Bed in lowest position, call bell within reach. Will continue to monitor.

## 2019-09-05 NOTE — PROGRESS NOTES
"Sponsor: Dr. Kermit Bernard M.D.    Study Title/IRB Number: Voice Signal Characteristics in Decompensated Heart Failure / 2018.324    Principle Investigator: Kermit Bernard M.D.    Present for Discussion: Yes     Is LAR Consenting for Subject: No    Prior to the Informed Consent (IC) being signed, or any study protocol required data collection, testing, procedure, or intervention being performed, the following was done and/or discussed:   Patient was given a copy of the IC for review    Purpose of the study and qualifications to participate    Study design, Follow up schedule, and tests or procedures done at each visit   Confidentiality and HIPAA Authorization for Release of Medical Records for the research trial/ subject's rights/research related injury   Risk, Benefits, Alternative Treatments, Compensation and Costs   Participation in the research trial is voluntary and patient may withdraw at anytime   Contact information for study related questions    Patient verbalizes understanding of the above: Yes  Contact information for CRC and PI given to patient: Yes  Patient able to adequately summarize: the purpose of the study, the risks associated with the study, and all procedures, testing, and follow-ups associated with the study: Yes    Patient signed the informed consent form for the Voice Signal Characteristics in Decompensated Heart Failure research study with an IRB approval date of 09/11/2018.  Each page of the consent form was reviewed with patient and all questions answered satisfactorily. Patient signed the consent form and received a copy of same. The original consent was scanned into electronic medical records (EPIC) and filed into the subject's research study chart.    Ms. Navas was able to complete the following upon entry of the study:    - Subject was outfitted with [employer]'s "ReDS" vest. Height and weight obtained from patient medical history, which was input into the " ""ReDS" interface system. Patient instructed on use, lung water reading obtained: Yes  - Subject was able to read the following passages, and a voice recording was obtained:  Sustained vowels: Yes  CAPE-V sentences: Yes  [place] Passage: Yes  - Subject was able to stand upright with bare feet on the Bodyport scale for a minimum of 30 seconds: Yes     Dr. Kermit Bernard M.D. has reviewed the following inclusion/ exclusion criteria and confirms that subject meets all Inclusion and no Exclusion criteria at this time:      Inclusion-   - Subject is currently admitted with acute decompensated heart failure to [facility].  -  Subject is awake, alert, can speak English without difficulty.  - Subject can stand for =/> 30 seconds without difficulty.  - Subject must be literate.  - Subject must have a BMI of 22 to 38.  - Subject must be between 5'1" and 6'5" in height.  - Subject must be able to provide informed consent.    Exclusion-  - Subject cannot read.  - Subject has difficulty standing and maintaining balance for 30 seconds.  - Subject has a BMI under 22 or over 38.  - Subject is under 5'1" or over 6'5" in height.  - Subject has an LVAD.  - Subject has an implanted device (ie: pacemaker, port) on the right side of chest wall.  - Subject has had a heart transplant within the last 6 weeks.  - Subject has had any surgery or incision in the thoracic region within the last 6 weeks that would cause discomfort during vest readings.  - Subjects that require masked (Venturi or rebreather) oxygen support, or require mechanical support (i.e. left ventricular assist device, intra-aortic balloon, etc.).          "

## 2019-09-05 NOTE — NURSING
Met with pt at bedside.  She reports she read some of her educational material but not all of it.  Her mom will be bringing her folder to her later today.    Provided pt with a second copy of the consent form and requested she read and initial each section.  Pt voiced understanding.   She will call when she has completed her paperwork and is ready for the educational session.

## 2019-09-05 NOTE — PROGRESS NOTES
Pt transported to CT. Dobutamine and Lasix infusing at prescribed rate. No complaints or distress noted. Awaiting patient's return

## 2019-09-06 ENCOUNTER — EDUCATION (OUTPATIENT)
Dept: TRANSPLANT | Facility: CLINIC | Age: 49
End: 2019-09-06

## 2019-09-06 PROBLEM — I35.0 AORTIC STENOSIS: Status: ACTIVE | Noted: 2019-09-06

## 2019-09-06 LAB
ABORH REPEAT: NORMAL
ALBUMIN SERPL BCP-MCNC: 3.9 G/DL (ref 3.5–5.2)
ALLENS TEST: ABNORMAL
ALP SERPL-CCNC: 76 U/L (ref 55–135)
ALT SERPL W/O P-5'-P-CCNC: 17 U/L (ref 10–44)
ANION GAP SERPL CALC-SCNC: 13 MMOL/L (ref 8–16)
ANION GAP SERPL CALC-SCNC: 15 MMOL/L (ref 8–16)
AST SERPL-CCNC: 17 U/L (ref 10–40)
BACTERIA #/AREA URNS AUTO: NORMAL /HPF
BASOPHILS # BLD AUTO: 0.02 K/UL (ref 0–0.2)
BASOPHILS NFR BLD: 0.3 % (ref 0–1.9)
BILIRUB DIRECT SERPL-MCNC: 0.3 MG/DL (ref 0.1–0.3)
BILIRUB SERPL-MCNC: 0.6 MG/DL (ref 0.1–1)
BILIRUB UR QL STRIP: NEGATIVE
BNP SERPL-MCNC: 140 PG/ML (ref 0–99)
BUN SERPL-MCNC: 30 MG/DL (ref 6–20)
BUN SERPL-MCNC: 30 MG/DL (ref 6–20)
CALCIUM SERPL-MCNC: 9.4 MG/DL (ref 8.7–10.5)
CALCIUM SERPL-MCNC: 9.9 MG/DL (ref 8.7–10.5)
CHLORIDE SERPL-SCNC: 101 MMOL/L (ref 95–110)
CHLORIDE SERPL-SCNC: 101 MMOL/L (ref 95–110)
CHOLEST SERPL-MCNC: 110 MG/DL (ref 120–199)
CHOLEST/HDLC SERPL: 2.3 {RATIO} (ref 2–5)
CLARITY UR REFRACT.AUTO: CLEAR
CO2 SERPL-SCNC: 22 MMOL/L (ref 23–29)
CO2 SERPL-SCNC: 27 MMOL/L (ref 23–29)
COLOR UR AUTO: YELLOW
CREAT SERPL-MCNC: 1.7 MG/DL (ref 0.5–1.4)
CREAT SERPL-MCNC: 2.1 MG/DL (ref 0.5–1.4)
DELSYS: ABNORMAL
DIFFERENTIAL METHOD: NORMAL
EOSINOPHIL # BLD AUTO: 0.1 K/UL (ref 0–0.5)
EOSINOPHIL NFR BLD: 1.9 % (ref 0–8)
ERYTHROCYTE [DISTWIDTH] IN BLOOD BY AUTOMATED COUNT: 13.9 % (ref 11.5–14.5)
EST. GFR  (AFRICAN AMERICAN): 31.2 ML/MIN/1.73 M^2
EST. GFR  (AFRICAN AMERICAN): 40.2 ML/MIN/1.73 M^2
EST. GFR  (NON AFRICAN AMERICAN): 27 ML/MIN/1.73 M^2
EST. GFR  (NON AFRICAN AMERICAN): 34.9 ML/MIN/1.73 M^2
ESTIMATED AVG GLUCOSE: 140 MG/DL (ref 68–131)
FERRITIN SERPL-MCNC: 305 NG/ML (ref 20–300)
GLUCOSE SERPL-MCNC: 115 MG/DL (ref 70–110)
GLUCOSE SERPL-MCNC: 146 MG/DL (ref 70–110)
GLUCOSE UR QL STRIP: NEGATIVE
HBA1C MFR BLD HPLC: 6.5 % (ref 4–5.6)
HCO3 UR-SCNC: 27.4 MMOL/L (ref 24–28)
HCT VFR BLD AUTO: 39.9 % (ref 37–48.5)
HDLC SERPL-MCNC: 47 MG/DL (ref 40–75)
HDLC SERPL: 42.7 % (ref 20–50)
HGB BLD-MCNC: 12.9 G/DL (ref 12–16)
HGB UR QL STRIP: NEGATIVE
IMM GRANULOCYTES # BLD AUTO: 0.02 K/UL (ref 0–0.04)
IMM GRANULOCYTES NFR BLD AUTO: 0.3 % (ref 0–0.5)
IRON SERPL-MCNC: 33 UG/DL (ref 30–160)
KETONES UR QL STRIP: NEGATIVE
LDH SERPL L TO P-CCNC: 246 U/L (ref 110–260)
LDLC SERPL CALC-MCNC: 45.8 MG/DL (ref 63–159)
LEUKOCYTE ESTERASE UR QL STRIP: ABNORMAL
LYMPHOCYTES # BLD AUTO: 1.1 K/UL (ref 1–4.8)
LYMPHOCYTES NFR BLD: 18.2 % (ref 18–48)
MAGNESIUM SERPL-MCNC: 2.2 MG/DL (ref 1.6–2.6)
MAGNESIUM SERPL-MCNC: 2.2 MG/DL (ref 1.6–2.6)
MCH RBC QN AUTO: 29.3 PG (ref 27–31)
MCHC RBC AUTO-ENTMCNC: 32.3 G/DL (ref 32–36)
MCV RBC AUTO: 91 FL (ref 82–98)
MICROSCOPIC COMMENT: NORMAL
MONOCYTES # BLD AUTO: 0.5 K/UL (ref 0.3–1)
MONOCYTES NFR BLD: 8.2 % (ref 4–15)
NEUTROPHILS # BLD AUTO: 4.4 K/UL (ref 1.8–7.7)
NEUTROPHILS NFR BLD: 71.1 % (ref 38–73)
NITRITE UR QL STRIP: NEGATIVE
NONHDLC SERPL-MCNC: 63 MG/DL
NRBC BLD-RTO: 0 /100 WBC
PCO2 BLDA: 42.5 MMHG (ref 35–45)
PH SMN: 7.42 [PH] (ref 7.35–7.45)
PH UR STRIP: 5 [PH] (ref 5–8)
PHOSPHATE SERPL-MCNC: 3.4 MG/DL (ref 2.7–4.5)
PHOSPHATE SERPL-MCNC: 3.8 MG/DL (ref 2.7–4.5)
PLATELET # BLD AUTO: 256 K/UL (ref 150–350)
PMV BLD AUTO: 9.8 FL (ref 9.2–12.9)
PO2 BLDA: 33 MMHG (ref 40–60)
POC BE: 3 MMOL/L
POC SATURATED O2: 64 % (ref 95–100)
POC TCO2: 29 MMOL/L (ref 24–29)
POTASSIUM SERPL-SCNC: 3.9 MMOL/L (ref 3.5–5.1)
POTASSIUM SERPL-SCNC: 4.1 MMOL/L (ref 3.5–5.1)
PREALB SERPL-MCNC: 32 MG/DL (ref 20–43)
PROT SERPL-MCNC: 7.5 G/DL (ref 6–8.4)
PROT UR QL STRIP: NEGATIVE
RBC # BLD AUTO: 4.4 M/UL (ref 4–5.4)
RBC #/AREA URNS AUTO: 1 /HPF (ref 0–4)
SAMPLE: ABNORMAL
SITE: ABNORMAL
SODIUM SERPL-SCNC: 138 MMOL/L (ref 136–145)
SODIUM SERPL-SCNC: 141 MMOL/L (ref 136–145)
SP GR UR STRIP: 1.01 (ref 1–1.03)
SQUAMOUS #/AREA URNS AUTO: 4 /HPF
T4 FREE SERPL-MCNC: 1.49 NG/DL (ref 0.71–1.51)
TRANSFERRIN SERPL-MCNC: 234 MG/DL (ref 200–375)
TRIGL SERPL-MCNC: 86 MG/DL (ref 30–150)
TSH SERPL DL<=0.005 MIU/L-ACNC: 2.29 UIU/ML (ref 0.4–4)
URN SPEC COLLECT METH UR: ABNORMAL
WBC # BLD AUTO: 6.22 K/UL (ref 3.9–12.7)
WBC #/AREA URNS AUTO: 4 /HPF (ref 0–5)

## 2019-09-06 PROCEDURE — 86790 VIRUS ANTIBODY NOS: CPT | Mod: NTX

## 2019-09-06 PROCEDURE — 84134 ASSAY OF PREALBUMIN: CPT | Mod: NTX

## 2019-09-06 PROCEDURE — 86706 HEP B SURFACE ANTIBODY: CPT | Mod: NTX

## 2019-09-06 PROCEDURE — 20600001 HC STEP DOWN PRIVATE ROOM: Mod: NTX

## 2019-09-06 PROCEDURE — 63600175 PHARM REV CODE 636 W HCPCS: Mod: NTX | Performed by: PHYSICIAN ASSISTANT

## 2019-09-06 PROCEDURE — 86803 HEPATITIS C AB TEST: CPT | Mod: NTX

## 2019-09-06 PROCEDURE — 80061 LIPID PANEL: CPT | Mod: NTX

## 2019-09-06 PROCEDURE — 86777 TOXOPLASMA ANTIBODY: CPT | Mod: NTX

## 2019-09-06 PROCEDURE — 99232 PR SUBSEQUENT HOSPITAL CARE,LEVL II: ICD-10-PCS | Mod: NTX,,, | Performed by: INTERNAL MEDICINE

## 2019-09-06 PROCEDURE — 82248 BILIRUBIN DIRECT: CPT | Mod: NTX

## 2019-09-06 PROCEDURE — 86665 EPSTEIN-BARR CAPSID VCA: CPT | Mod: NTX

## 2019-09-06 PROCEDURE — 84443 ASSAY THYROID STIM HORMONE: CPT | Mod: NTX

## 2019-09-06 PROCEDURE — 99232 SBSQ HOSP IP/OBS MODERATE 35: CPT | Mod: NTX,,, | Performed by: INTERNAL MEDICINE

## 2019-09-06 PROCEDURE — 86832 HLA CLASS I HIGH DEFIN QUAL: CPT | Mod: TXP

## 2019-09-06 PROCEDURE — 84439 ASSAY OF FREE THYROXINE: CPT | Mod: NTX

## 2019-09-06 PROCEDURE — 83735 ASSAY OF MAGNESIUM: CPT | Mod: NTX

## 2019-09-06 PROCEDURE — 80307 DRUG TEST PRSMV CHEM ANLYZR: CPT | Mod: NTX

## 2019-09-06 PROCEDURE — 86829 HLA CLASS I/II ANTIBODY QUAL: CPT | Mod: 91,TXP

## 2019-09-06 PROCEDURE — 86787 VARICELLA-ZOSTER ANTIBODY: CPT | Mod: NTX

## 2019-09-06 PROCEDURE — 84100 ASSAY OF PHOSPHORUS: CPT | Mod: NTX

## 2019-09-06 PROCEDURE — 83880 ASSAY OF NATRIURETIC PEPTIDE: CPT | Mod: NTX

## 2019-09-06 PROCEDURE — 84100 ASSAY OF PHOSPHORUS: CPT | Mod: 91,NTX

## 2019-09-06 PROCEDURE — 81375 HLA II TYPING AG EQUIV LR: CPT | Mod: TXP

## 2019-09-06 PROCEDURE — 80048 BASIC METABOLIC PNL TOTAL CA: CPT | Mod: NTX

## 2019-09-06 PROCEDURE — 86833 HLA CLASS II HIGH DEFIN QUAL: CPT | Mod: TXP

## 2019-09-06 PROCEDURE — 81372 HLA I TYPING COMPLETE LR: CPT | Mod: TXP

## 2019-09-06 PROCEDURE — 86825 HLA X-MATH NON-CYTOTOXIC: CPT | Mod: TXP

## 2019-09-06 PROCEDURE — 86703 HIV-1/HIV-2 1 RESULT ANTBDY: CPT | Mod: NTX

## 2019-09-06 PROCEDURE — 99255 IP/OBS CONSLTJ NEW/EST HI 80: CPT | Mod: NTX,,, | Performed by: NURSE PRACTITIONER

## 2019-09-06 PROCEDURE — 80323 ALKALOIDS NOS: CPT | Mod: NTX

## 2019-09-06 PROCEDURE — 84466 ASSAY OF TRANSFERRIN: CPT | Mod: NTX

## 2019-09-06 PROCEDURE — 86592 SYPHILIS TEST NON-TREP QUAL: CPT | Mod: NTX

## 2019-09-06 PROCEDURE — 99255 PR INITIAL INPATIENT CONSULT,LEVL V: ICD-10-PCS | Mod: NTX,,, | Performed by: NURSE PRACTITIONER

## 2019-09-06 PROCEDURE — 86682 HELMINTH ANTIBODY: CPT | Mod: NTX

## 2019-09-06 PROCEDURE — 99900035 HC TECH TIME PER 15 MIN (STAT): Mod: NTX

## 2019-09-06 PROCEDURE — 86644 CMV ANTIBODY: CPT | Mod: NTX

## 2019-09-06 PROCEDURE — 81001 URINALYSIS AUTO W/SCOPE: CPT | Mod: NTX

## 2019-09-06 PROCEDURE — 86901 BLOOD TYPING SEROLOGIC RH(D): CPT | Mod: NTX

## 2019-09-06 PROCEDURE — 86696 HERPES SIMPLEX TYPE 2 TEST: CPT | Mod: NTX

## 2019-09-06 PROCEDURE — 86977 RBC SERUM PRETX INCUBJ/INHIB: CPT | Mod: TXP

## 2019-09-06 PROCEDURE — 82803 BLOOD GASES ANY COMBINATION: CPT | Mod: NTX

## 2019-09-06 PROCEDURE — 83540 ASSAY OF IRON: CPT | Mod: NTX

## 2019-09-06 PROCEDURE — 83615 LACTATE (LD) (LDH) ENZYME: CPT | Mod: NTX

## 2019-09-06 PROCEDURE — 25000003 PHARM REV CODE 250: Mod: NTX | Performed by: INTERNAL MEDICINE

## 2019-09-06 PROCEDURE — 80053 COMPREHEN METABOLIC PANEL: CPT | Mod: NTX

## 2019-09-06 PROCEDURE — 36415 COLL VENOUS BLD VENIPUNCTURE: CPT | Mod: NTX

## 2019-09-06 PROCEDURE — 87340 HEPATITIS B SURFACE AG IA: CPT | Mod: NTX

## 2019-09-06 PROCEDURE — 86704 HEP B CORE ANTIBODY TOTAL: CPT | Mod: NTX

## 2019-09-06 PROCEDURE — 86828 HLA CLASS I&II ANTIBODY QUAL: CPT | Mod: TXP

## 2019-09-06 PROCEDURE — 25000003 PHARM REV CODE 250: Mod: NTX | Performed by: PHYSICIAN ASSISTANT

## 2019-09-06 PROCEDURE — 81240 F2 GENE: CPT | Mod: NTX

## 2019-09-06 PROCEDURE — 82728 ASSAY OF FERRITIN: CPT | Mod: NTX

## 2019-09-06 PROCEDURE — 83036 HEMOGLOBIN GLYCOSYLATED A1C: CPT | Mod: NTX

## 2019-09-06 PROCEDURE — 86825 HLA X-MATH NON-CYTOTOXIC: CPT | Mod: 91,TXP

## 2019-09-06 PROCEDURE — 63600175 PHARM REV CODE 636 W HCPCS: Mod: NTX | Performed by: INTERNAL MEDICINE

## 2019-09-06 PROCEDURE — 85025 COMPLETE CBC W/AUTO DIFF WBC: CPT | Mod: NTX

## 2019-09-06 RX ORDER — ACETAMINOPHEN 325 MG/1
650 TABLET ORAL EVERY 6 HOURS PRN
Status: DISCONTINUED | OUTPATIENT
Start: 2019-09-06 | End: 2019-09-10

## 2019-09-06 RX ORDER — HYDRALAZINE HYDROCHLORIDE 25 MG/1
25 TABLET, FILM COATED ORAL EVERY 8 HOURS
Status: DISCONTINUED | OUTPATIENT
Start: 2019-09-06 | End: 2019-09-07

## 2019-09-06 RX ADMIN — HYDRALAZINE HYDROCHLORIDE 25 MG: 25 TABLET, FILM COATED ORAL at 09:09

## 2019-09-06 RX ADMIN — ATORVASTATIN CALCIUM 40 MG: 20 TABLET, FILM COATED ORAL at 08:09

## 2019-09-06 RX ADMIN — DOCUSATE SODIUM 100 MG: 100 CAPSULE, LIQUID FILLED ORAL at 08:09

## 2019-09-06 RX ADMIN — AMIODARONE HYDROCHLORIDE 200 MG: 200 TABLET ORAL at 08:09

## 2019-09-06 RX ADMIN — DOBUTAMINE IN DEXTROSE 5 MCG/KG/MIN: 200 INJECTION, SOLUTION INTRAVENOUS at 01:09

## 2019-09-06 RX ADMIN — GABAPENTIN 400 MG: 400 CAPSULE ORAL at 08:09

## 2019-09-06 RX ADMIN — GABAPENTIN 400 MG: 400 CAPSULE ORAL at 05:09

## 2019-09-06 RX ADMIN — HEPARIN SODIUM 5000 UNITS: 5000 INJECTION, SOLUTION INTRAVENOUS; SUBCUTANEOUS at 09:09

## 2019-09-06 RX ADMIN — PROMETHAZINE HYDROCHLORIDE 25 MG: 12.5 TABLET ORAL at 05:09

## 2019-09-06 RX ADMIN — ISOSORBIDE DINITRATE 20 MG: 20 TABLET ORAL at 05:09

## 2019-09-06 RX ADMIN — HYDRALAZINE HYDROCHLORIDE 25 MG: 25 TABLET, FILM COATED ORAL at 01:09

## 2019-09-06 RX ADMIN — FUROSEMIDE 10 MG/HR: 10 INJECTION, SOLUTION INTRAMUSCULAR; INTRAVENOUS at 07:09

## 2019-09-06 RX ADMIN — ISOSORBIDE DINITRATE 20 MG: 20 TABLET ORAL at 01:09

## 2019-09-06 RX ADMIN — HYDRALAZINE HYDROCHLORIDE 10 MG: 10 TABLET, FILM COATED ORAL at 05:09

## 2019-09-06 RX ADMIN — GABAPENTIN 400 MG: 400 CAPSULE ORAL at 09:09

## 2019-09-06 RX ADMIN — HEPARIN SODIUM 5000 UNITS: 5000 INJECTION, SOLUTION INTRAVENOUS; SUBCUTANEOUS at 05:09

## 2019-09-06 RX ADMIN — ASPIRIN 81 MG: 81 TABLET, COATED ORAL at 08:09

## 2019-09-06 RX ADMIN — AMIODARONE HYDROCHLORIDE 200 MG: 200 TABLET ORAL at 09:09

## 2019-09-06 RX ADMIN — ISOSORBIDE DINITRATE 20 MG: 20 TABLET ORAL at 09:09

## 2019-09-06 RX ADMIN — SPIRONOLACTONE 25 MG: 25 TABLET, FILM COATED ORAL at 08:09

## 2019-09-06 RX ADMIN — HEPARIN SODIUM 5000 UNITS: 5000 INJECTION, SOLUTION INTRAVENOUS; SUBCUTANEOUS at 01:09

## 2019-09-06 RX ADMIN — ZOLPIDEM TARTRATE 10 MG: 5 TABLET ORAL at 09:09

## 2019-09-06 RX ADMIN — POTASSIUM CHLORIDE 40 MEQ: 20 TABLET, EXTENDED RELEASE ORAL at 08:09

## 2019-09-06 RX ADMIN — VENLAFAXINE 150 MG: 37.5 TABLET ORAL at 08:09

## 2019-09-06 NOTE — SUBJECTIVE & OBJECTIVE
"Interval History: Hydralazine and Isordil added yesterday; BP has been stable. Thyroid ultrasound done; see report. She reports feeling "jittery" this morning and has a headache.  Otherwise, she is feeling better on .  She walked the halls yesterday without fatigue.  She is net negative 2.1L in the last 24 hours.  CVP: 7, SVO2: 64, CO: 6.77, CI: 3.04 and SVR: 898 using an oxygen consumption of 363.  TSH and FT4 ordered     Lines:  Right IJ: 9/4/19    Continuous Infusions:   DOBUTamine 5 mcg/kg/min (09/05/19 2005)    furosemide (LASIX) 2 mg/mL infusion (non-titrating) 5 mg/hr (09/06/19 0828)     Scheduled Meds:   amiodarone  200 mg Oral BID    aspirin  81 mg Oral Daily    atorvastatin  40 mg Oral Daily    docusate sodium  100 mg Oral Daily    gabapentin  400 mg Oral TID    heparin (porcine)  5,000 Units Subcutaneous Q8H    hydrALAZINE  10 mg Oral Q8H    isosorbide dinitrate  20 mg Oral Q8H    potassium chloride  40 mEq Oral Daily    spironolactone  25 mg Oral Daily    venlafaxine  150 mg Oral Daily     PRN Meds:acetaminophen, diphenhydrAMINE, oxyCODONE-acetaminophen, promethazine, sodium chloride 0.9%, zolpidem    Review of patient's allergies indicates:   Allergen Reactions    Codeine Itching     Objective:     Vital Signs (Most Recent):  Temp: 98.4 °F (36.9 °C) (09/06/19 0746)  Pulse: 89 (09/06/19 1111)  Resp: 17 (09/06/19 0746)  BP: 110/61 (09/06/19 0746)  SpO2: 98 % (09/06/19 0746) Vital Signs (24h Range):  Temp:  [97.2 °F (36.2 °C)-98.4 °F (36.9 °C)] 98.4 °F (36.9 °C)  Pulse:  [69-89] 89  Resp:  [16-18] 17  SpO2:  [95 %-98 %] 98 %  BP: (100-116)/(59-74) 110/61     Patient Vitals for the past 72 hrs (Last 3 readings):   Weight   09/06/19 0746 103.7 kg (228 lb 9.9 oz)   09/05/19 1500 103.7 kg (228 lb 9.9 oz)   09/05/19 0700 103.7 kg (228 lb 9.9 oz)     Body mass index is 35.05 kg/m².      Intake/Output Summary (Last 24 hours) at 9/6/2019 1114  Last data filed at 9/6/2019 0900  Gross per 24 hour "   Intake 365 ml   Output 2100 ml   Net -1735 ml       Hemodynamic Parameters:  CVP:  [7 mmHg] 7 mmHg    Telemetry: reviewed  Physical Exam  Constitutional: She is oriented to person, place, and time. Laying in bed NAD  Neck: Normal range of motion. Neck supple. JVD elevation just above the clavicle   Cardiovascular: Normal rate and regular rhythm. Exam reveals no gallop and no friction rub. III/VI systolic murmur .  Pulmonary/Chest: Effort normal and breath sounds normal. She has no wheezes. She has no rales.   Abdominal: Soft. Bowel sounds are normal. There is no tenderness.   Musculoskeletal: She exhibits no edema.   Neurological: She is alert and oriented to person, place, and time.   Skin: Skin is warm and dry    Significant Labs:  CBC:  Recent Labs   Lab 09/04/19  0847 09/05/19  0432 09/06/19  0426   WBC 6.82 6.80 6.22   RBC 5.34 4.82 4.40   HGB 15.4 14.0 12.9   HCT 48.6* 42.5 39.9    303 256   MCV 91 88 91   MCH 28.8 29.0 29.3   MCHC 31.7* 32.9 32.3     BNP:  No results for input(s): BNP in the last 168 hours.    Invalid input(s): BNPTRIAGELBLO  CMP:  Recent Labs   Lab 09/04/19  0847  09/05/19  0432 09/05/19  1800 09/06/19  0426      < > 113* 147* 115*   CALCIUM 10.4   < > 9.5 9.2 9.4   ALBUMIN 3.7  --  3.9  --  3.9   PROT 8.1  --  7.7  --  7.5      < > 140 141 141   K 4.8   < > 3.8 3.3* 3.9   CO2 27   < > 25 26 27      < > 101 101 101   BUN 35*   < > 34* 34* 30*   CREATININE 1.5*   < > 1.6* 1.8* 1.7*   ALKPHOS 76  --  76  --  76   ALT 21  --  19  --  17   AST 17  --  20  --  17   BILITOT 0.5  --  0.6  --  0.6    < > = values in this interval not displayed.      Coagulation:   Recent Labs   Lab 09/04/19  0847   INR 1.1     LDH:  No results for input(s): LDH in the last 72 hours.  Microbiology:  Microbiology Results (last 7 days)     ** No results found for the last 168 hours. **          I have reviewed all pertinent labs within the past 24 hours.    Estimated Creatinine Clearance:  50.2 mL/min (A) (based on SCr of 1.7 mg/dL (H)).    Diagnostic Results:  I have reviewed all pertinent imaging results/findings within the past 24 hours.

## 2019-09-06 NOTE — PLAN OF CARE
Problem: Adult Inpatient Plan of Care  Goal: Plan of Care Review  Outcome: Ongoing (interventions implemented as appropriate)  Patient remained free of injuries, falls and trauma. VS stable. No complaints of pain. Lasix infusing at prescribed rate. Dobutamine increased to 5mcg/kg. Patient tolerating well. Frequent checks was completed per orders. Echo done EF 15%. Hydralazine and Isosorbide started. Strict I/O being monitored. Falls risk maintained. Plan of care reviewed with pt. Pt verbalized understanding. All questions and concerns addressed. Will continue to monitor.

## 2019-09-06 NOTE — SUBJECTIVE & OBJECTIVE
No current facility-administered medications on file prior to encounter.      Current Outpatient Medications on File Prior to Encounter   Medication Sig    amiodarone (PACERONE) 200 MG Tab Take 200 mg by mouth 2 (two) times daily.     aspirin (ECOTRIN) 81 MG EC tablet Take 81 mg by mouth.    atorvastatin (LIPITOR) 40 MG tablet Take 40 mg by mouth once daily.    cpm-phenyleph-acetaminophen (NOREL AD) 4- mg Tab Take by mouth 2 (two) times daily.    docusate sodium (COLACE) 100 MG capsule Take 100 mg by mouth once daily.    furosemide (LASIX) 40 MG tablet Take 40 mg by mouth.    gabapentin (NEURONTIN) 800 MG tablet Take 400 mg by mouth 3 (three) times daily.     loratadine (CLARITIN) 10 mg tablet Take 10 mg by mouth daily as needed.    oxycodone-acetaminophen (PERCOCET) 5-325 mg per tablet Take 1 tablet by mouth every 4 (four) hours as needed for Pain.    potassium chloride (K-TAB) 20 mEq Take 2 tablets by mouth.    promethazine (PHENERGAN) 25 MG tablet Take 25 mg by mouth every 6 (six) hours as needed for Nausea.    spironolactone (ALDACTONE) 25 MG tablet Take 25 mg by mouth.    VENLAFAXINE HCL (EFFEXOR ORAL) Take 150 mg by mouth once daily.    ZOLPIDEM TARTRATE (AMBIEN ORAL) Take 12.5 mg by mouth nightly as needed.    metOLazone (ZAROXOLYN) 5 MG tablet Take 2.5 mg by mouth daily as needed.       Review of patient's allergies indicates:   Allergen Reactions    Codeine Itching       Past Medical History:   Diagnosis Date    Fractures     History of ventricular fibrillation 9/4/2019    History of ventricular tachycardia 9/4/2019    Hyperlipidemia     Migraine     Osteoarthritis      Past Surgical History:   Procedure Laterality Date    BACK SURGERY      2007    BONE GRAFT Left     from Left hip to Left FA    eardrum reconstruction  1980    ELBOW SURGERY Left [id number]    FOREARM FRACTURE SURGERY Bilateral [id number]    multiple surgeries    INSERTION OF IMPLANTABLE  CARDIOVERTER-DEFIBRILLATOR (ICD) GENERATOR WITH TWO EXISTING LEADS      INSERTION OF PACEMAKER Left     INSERTION, CATHETER, RIGHT HEART Right 9/4/2019    [facility]    LUMBAR FUSION  2007    L4-L5    SINUS SURGERY Right 1994    with lymph nodes    TEMPOROMANDIBULAR JOINT SURGERY Right 1988    TONSILLECTOMY      TYMPANOSTOMY TUBE PLACEMENT  1971- 1979    multiple tube placements     Family History     Problem Relation (Age of Onset)    Broken bones Maternal Grandmother    Cancer Paternal Grandmother    Dislocations Maternal Grandmother    Heart failure Paternal Grandfather, Maternal Grandfather    Migraines Mother, Maternal Grandmother, Paternal Grandmother, Paternal Grandfather, Maternal Grandfather    Obesity Paternal Grandmother    Osteoarthritis Mother, Maternal Grandmother, Paternal Grandmother, Paternal Grandfather, Maternal Grandfather, Father    Osteoporosis Maternal Grandmother    Scoliosis Maternal Grandmother    Stroke Father        Tobacco Use    Smoking status: Never Smoker    Smokeless tobacco: Never Used   Substance and Sexual Activity    Alcohol use: No    Drug use: No    Sexual activity: Not Currently     Review of Systems   Constitutional: Negative for activity change and fatigue.   Respiratory: Positive for shortness of breath. Negative for cough.    Cardiovascular: Negative for chest pain, palpitations and leg swelling.   Gastrointestinal: Negative for abdominal pain, nausea and vomiting.   Endocrine: Negative for polydipsia, polyphagia and polyuria.   Genitourinary: Negative for dysuria.   Musculoskeletal: Negative for gait problem.   Skin: Negative for rash.   Allergic/Immunologic: Negative for immunocompromised state.   Neurological: Negative for dizziness, syncope and weakness.   Hematological: Does not bruise/bleed easily.   Psychiatric/Behavioral: Negative for behavioral problems.     Objective:     Vital Signs (Most Recent):  Temp: 98.4  °F (36.9 °C) (09/06/19 0746)  Pulse: 80 (09/06/19 0746)  Resp: 17 (09/06/19 0746)  BP: 110/61 (09/06/19 0746)  SpO2: 98 % (09/06/19 0746) Vital Signs (24h Range):  Temp:  [97.2 °F (36.2 °C)-98.4 °F (36.9 °C)] 98.4 °F (36.9 °C)  Pulse:  [69-86] 80  Resp:  [16-18] 17  SpO2:  [95 %-98 %] 98 %  BP: (100-116)/(59-74) 110/61     Weight: 103.7 kg (228 lb 9.9 oz)  Body mass index is 35.05 kg/m².    SpO2: 98 %  O2 Device (Oxygen Therapy): room air     Intake/Output - Last 3 Shifts       09/04 0700 - 09/05 0659 09/05 0700 - 09/06 0659 09/06 0700 - 09/07 0659    P.O. 570  365    Total Intake(mL/kg) 570 (5.4)  365 (3.5)    Urine (mL/kg/hr) 1900 2100 (0.8)     Total Output 1900 2100     Net -1330 -2100 +365           Urine Occurrence 1 x             Lines/Drains/Airways     Central Venous Catheter Line                 Introducer 09/04/19 1126 right internal jugular 1 day         Percutaneous Central Line Insertion/Assessment - triple lumen  09/04/19 1128 right internal jugular 1 day              Physical Exam   Constitutional: She is oriented to person, place, and time. She appears well-developed and well-nourished.   HENT:   Head: Normocephalic.   Nose: Nose normal.   Eyes: EOM are normal.   Neck: Normal range of motion.   Cardiovascular: Normal rate, regular rhythm and normal heart sounds.   Pulmonary/Chest: Effort normal and breath sounds normal.   Abdominal: Soft.   Musculoskeletal: Normal range of motion.   Neurological: She is alert and oriented to person, place, and time.   Skin: Skin is warm, dry and intact.   Psychiatric: She has a normal mood and affect.       Significant Labs:  BMP:   Recent Labs   Lab 09/05/19  1800 09/06/19  0426   * 115*    141   K 3.3* 3.9    101   CO2 26 27   BUN 34* 30*   CREATININE 1.8* 1.7*   CALCIUM 9.2 9.4   MG 2.1  --      CBC:   Recent Labs   Lab 09/06/19  0426   WBC 6.22   RBC 4.40   HGB 12.9   HCT 39.9      MCV 91   MCH 29.3   MCHC 32.3     CMP:   Recent Labs    Lab 09/06/19  0426   *   CALCIUM 9.4   ALBUMIN 3.9   PROT 7.5      K 3.9   CO2 27      BUN 30*   CREATININE 1.7*   ALKPHOS 76   ALT 17   AST 17   BILITOT 0.6       Significant Diagnostics:  ECHO:   · Severe left ventricular enlargement.  · Severely decreased left ventricular systolic function. The estimated ejection fraction is 15%  · Mild right ventricular enlargement.  · Normal right ventricular systolic function.  · Grade III (severe) left ventricular diastolic dysfunction consistent with restrictive physiology.  · Moderate biatrial enlargement.  · Mild tricuspid regurgitation.  · Mild-to-moderate mitral regurgitation.  · The estimated PA systolic pressure is 41 mm Hg  · Normal central venous pressure (3 mm Hg).  LV 6.5cm  TAPSE 3.3cm    All diagnotics and labs reviewed.

## 2019-09-06 NOTE — ASSESSMENT & PLAN NOTE
-NICM  -Last 2D Echo done at outside facility.  EF: 20%, LVEDD: 6.8cm. Repeat echo ordered and pending  - RHC: done on admit 9/4/19 RA: 20/ 20/ 18 RV: 60/ 8/ 18 PA: 60/ 32/ 45 PWP: 43/ 70/ 40 . Cardiac output was 2.27 by Fitz. Cardiac index is 1.04 L/min/m2. O2 Sat: PA 34%. AO 95% PVR 2.2 PALMER  -Started on low dose  at 2.5 and titrated up to 5 and she is tolerating it well.  Checking electrolytes twice a day given hx of ICD shocks in setting of hypokalemia  -SVR has improved to 898 after adding Hydralazine and Isordil.  Will increase Hydralazine today  -Hypervolemic; but improving on examination today; Will decrease Lasix drip to 5mg/hr  -GDMT with Spironolactone, Hydralazine and Isordil today  -Patient is currently being evaluated for OHTx/VAD  -Heart failure pathway Step 2  -2g Na dietary restriction, 1500 mL fluid restriction, strict I/Os

## 2019-09-06 NOTE — PROGRESS NOTES
Update:    SW to pt's room for update. Pt aaox4, calm, and pleasant. Pt reports feeling a little overwhelmed with the idea of TX or LVAD and is still processing the information given. Pt reports she has spoken to her mother , sister, and best friend about care giving. Pt reports plan to continue discussing care giving with family and friends over the weekend, and she intends to have caregiver plan worked out by Monday. Pt reports he mother (who lives in [place]) is in [place] at pt's apartment right now, and plans to be at hospital on Monday. Pt reports no other questions or concerns at this time. SW providing ongoing psychosocial and counseling support, education, assistance, resources, and discharge planning as indicated. SW continuing to follow and remains available.

## 2019-09-06 NOTE — PROGRESS NOTES
Patient AAO with no family at bedside. Patient explains she is unsure who her caregiver will be. Patient will come to this decision over the weekend. Patient will have caregiver read over VAD information as well.     Patient was given EMS form as well as Home inspection checklist to work on.

## 2019-09-06 NOTE — PROGRESS NOTES
EDUCATION NOTE:    Deborah Navas was seen today for pre-heart transplant education.  Patient signed wellness contract and informed consent to undergo heart transplant evaluation work-up.  Thorough pre-transplant education conducted.      Information presented included:  · Evaluation process  · Members of the transplant team  · Selection committee members and role of the committee  · Listing process for transplant  · Different listing designations, including status 7  · 1-year graft survival statistics  · LVAD as bridge to transplant or DT  · Need to reach patient within 15 minutes of donor offer  · CDC high risk donors  · Blood transfusions  · Process for matching donor with recipient  · Need for weight loss and how it relates to the wait time  · Post-transplant immunosuppression for life with need to be able to afford post-transplant medications  · Need for a caregiver to be with them at all times beginning with discharge from ICU, through at least the first 6 weeks post-transplant  · Need to find local housing for the first 6 weeks post-transplant  · How to reach team members at any time  · [facility] website with written instructions regarding how to look up information specific to [facility]  · Use of Hepatitis C organs    Patient asked pertinent questions, which were answered to their satisfaction.  Patient was also given a copy of the wellness contract and informed consent to undergo evaluation work-up.

## 2019-09-06 NOTE — HPI
47 yo female with hx NICM: EF 20%, LVEDD: 6.8cm, V-fib reported in setting of hypokalemia with appropriate shock from ICD (on Amiodarone), HLP, being admitted from procedure room for management of cardiogenic shock and workup for advanced options.  Patient initally say Dr. Zarate in clinic 8/27/19 as initial consult for advanced options.  At time, she reported feeling fine with only complaint of fatigue which was at baseline.    She had a RHC today which showed: RA: 20/ 20/ 18 RV: 60/ 8/ 18 PA: 60/ 32/ 45 PWP: 43/ 70/ 40 . Cardiac output was 2.27 by Fitz. Cardiac index is 1.04 L/min/m2. O2 Sat: PA 34%. AO 95% PVR 2.2 PALMER.  She is being admitted for diuresis, inotropic support (if she tolerates it from the arrhythmia standpoint), and work up for advanced options. Patient reports she feels fine today; she has been having a cold for the last 3 days but otherwise she has no new complaints.

## 2019-09-06 NOTE — ASSESSMENT & PLAN NOTE
CT does not preclude patient from advanced options. Continue with pathway. Dr. Walden has seen and will staff.

## 2019-09-06 NOTE — PLAN OF CARE
Problem: Adult Inpatient Plan of Care  Goal: Plan of Care Review  Patient remains free from falls and injuries through out shift. Patient AAO and VSS. Patient denies chest pain and SOB. Intermittent  dizziness reported. Patient complained of headache but has since subsided w/ out intervention.  gtt and Lasix gtt infusing as ordered. LVAD workup continued. Plan of care reviewed with patient. Patient verbalizes understanding of plan.  Will continue to monitor.

## 2019-09-06 NOTE — ASSESSMENT & PLAN NOTE
-found incidentally on CT  -Ultrasound done and showed enlarged multinodular thyroid which warrants surveillance in one year.  TSH and FT4 ordered.  Will follow up and consult Endocrine if abnormal.

## 2019-09-06 NOTE — CONSULTS
[facility]  Cardiothoracic Surgery  Consult Note    Patient Name: Deborah Navas  MRN: [id number]  Admission Date: 9/4/2019  Attending Physician: Vi Bryant MD  Referring Provider: Johny Zarate MD    Patient information was obtained from patient and past medical records.     Inpatient consult to Cardiothoracic Surgery  Consult performed by: Yris Mallory NP  Consult ordered by: ZAC Kelly  Reason for consult: Advanced options         Subjective:     Principal Problem: Acute on chronic combined systolic and diastolic heart failure    History of Present Illness: 47 yo female with hx NICM: EF 20%, LVEDD: 6.8cm, V-fib reported in setting of hypokalemia with appropriate shock from ICD (on Amiodarone), HLP, being admitted from procedure room for management of cardiogenic shock and workup for advanced options.  Patient initally say Dr. Zarate in clinic 8/27/19 as initial consult for advanced options.  At time, she reported feeling fine with only complaint of fatigue which was at baseline.    She had a RHC today which showed: RA: 20/ 20/ 18 RV: 60/ 8/ 18 PA: 60/ 32/ 45 PWP: 43/ 70/ 40 . Cardiac output was 2.27 by Fitz. Cardiac index is 1.04 L/min/m2. O2 Sat: PA 34%. AO 95% PVR 2.2 PALMER.  She is being admitted for diuresis, inotropic support (if she tolerates it from the arrhythmia standpoint), and work up for advanced options. Patient reports she feels fine today; she has been having a cold for the last 3 days but otherwise she has no new complaints.       No current facility-administered medications on file prior to encounter.      Current Outpatient Medications on File Prior to Encounter   Medication Sig    amiodarone (PACERONE) 200 MG Tab Take 200 mg by mouth 2 (two) times daily.     aspirin (ECOTRIN) 81 MG EC tablet Take 81 mg by mouth.    atorvastatin (LIPITOR) 40 MG tablet Take 40 mg by mouth once daily.    cpm-phenyleph-acetaminophen (NOREL AD) 4- mg Tab  Take by mouth 2 (two) times daily.    docusate sodium (COLACE) 100 MG capsule Take 100 mg by mouth once daily.    furosemide (LASIX) 40 MG tablet Take 40 mg by mouth.    gabapentin (NEURONTIN) 800 MG tablet Take 400 mg by mouth 3 (three) times daily.     loratadine (CLARITIN) 10 mg tablet Take 10 mg by mouth daily as needed.    oxycodone-acetaminophen (PERCOCET) 5-325 mg per tablet Take 1 tablet by mouth every 4 (four) hours as needed for Pain.    potassium chloride (K-TAB) 20 mEq Take 2 tablets by mouth.    promethazine (PHENERGAN) 25 MG tablet Take 25 mg by mouth every 6 (six) hours as needed for Nausea.    spironolactone (ALDACTONE) 25 MG tablet Take 25 mg by mouth.    VENLAFAXINE HCL (EFFEXOR ORAL) Take 150 mg by mouth once daily.    ZOLPIDEM TARTRATE (AMBIEN ORAL) Take 12.5 mg by mouth nightly as needed.    metOLazone (ZAROXOLYN) 5 MG tablet Take 2.5 mg by mouth daily as needed.       Review of patient's allergies indicates:   Allergen Reactions    Codeine Itching       Past Medical History:   Diagnosis Date    Fractures     History of ventricular fibrillation 9/4/2019    History of ventricular tachycardia 9/4/2019    Hyperlipidemia     Migraine     Osteoarthritis      Past Surgical History:   Procedure Laterality Date    BACK SURGERY      2007    BONE GRAFT Left     from Left hip to Left FA    eardrum reconstruction  1980    ELBOW SURGERY Left [id number]    FOREARM FRACTURE SURGERY Bilateral [id number]    multiple surgeries    INSERTION OF IMPLANTABLE CARDIOVERTER-DEFIBRILLATOR (ICD) GENERATOR WITH TWO EXISTING LEADS      INSERTION OF PACEMAKER Left     INSERTION, CATHETER, RIGHT HEART Right 9/4/2019    Performed by [facility] at [facility] CATH [facility]    LUMBAR FUSION  2007    L4-L5    SINUS SURGERY Right 1994    with lymph nodes    TEMPOROMANDIBULAR JOINT SURGERY Right 1988    TONSILLECTOMY      TYMPANOSTOMY TUBE PLACEMENT  1971- 1979    multiple tube placements     Family  History     Problem Relation (Age of Onset)    Broken bones Maternal Grandmother    Cancer Paternal Grandmother    Dislocations Maternal Grandmother    Heart failure Paternal Grandfather, Maternal Grandfather    Migraines Mother, Maternal Grandmother, Paternal Grandmother, Paternal Grandfather, Maternal Grandfather    Obesity Paternal Grandmother    Osteoarthritis Mother, Maternal Grandmother, Paternal Grandmother, Paternal Grandfather, Maternal Grandfather, Father    Osteoporosis Maternal Grandmother    Scoliosis Maternal Grandmother    Stroke Father        Tobacco Use    Smoking status: Never Smoker    Smokeless tobacco: Never Used   Substance and Sexual Activity    Alcohol use: No    Drug use: No    Sexual activity: Not Currently     Review of Systems   Constitutional: Negative for activity change and fatigue.   Respiratory: Positive for shortness of breath. Negative for cough.    Cardiovascular: Negative for chest pain, palpitations and leg swelling.   Gastrointestinal: Negative for abdominal pain, nausea and vomiting.   Endocrine: Negative for polydipsia, polyphagia and polyuria.   Genitourinary: Negative for dysuria.   Musculoskeletal: Negative for gait problem.   Skin: Negative for rash.   Allergic/Immunologic: Negative for immunocompromised state.   Neurological: Negative for dizziness, syncope and weakness.   Hematological: Does not bruise/bleed easily.   Psychiatric/Behavioral: Negative for behavioral problems.     Objective:     Vital Signs (Most Recent):  Temp: 98.4 °F (36.9 °C) (09/06/19 0746)  Pulse: 80 (09/06/19 0746)  Resp: 17 (09/06/19 0746)  BP: 110/61 (09/06/19 0746)  SpO2: 98 % (09/06/19 0746) Vital Signs (24h Range):  Temp:  [97.2 °F (36.2 °C)-98.4 °F (36.9 °C)] 98.4 °F (36.9 °C)  Pulse:  [69-86] 80  Resp:  [16-18] 17  SpO2:  [95 %-98 %] 98 %  BP: (100-116)/(59-74) 110/61     Weight: 103.7 kg (228 lb 9.9 oz)  Body mass index is 35.05 kg/m².    SpO2: 98 %  O2 Device (Oxygen Therapy): room  air     Intake/Output - Last 3 Shifts       09/04 0700 - 09/05 0659 09/05 0700 - 09/06 0659 09/06 0700 - 09/07 0659    P.O. 570  365    Total Intake(mL/kg) 570 (5.4)  365 (3.5)    Urine (mL/kg/hr) 1900 2100 (0.8)     Total Output 1900 2100     Net -1330 -2100 +365           Urine Occurrence 1 x             Lines/Drains/Airways     Central Venous Catheter Line                 Introducer 09/04/19 1126 right internal jugular 1 day         Percutaneous Central Line Insertion/Assessment - triple lumen  09/04/19 1128 right internal jugular 1 day              Physical Exam   Constitutional: She is oriented to person, place, and time. She appears well-developed and well-nourished.   HENT:   Head: Normocephalic.   Nose: Nose normal.   Eyes: EOM are normal.   Neck: Normal range of motion.   Cardiovascular: Normal rate, regular rhythm and normal heart sounds.   Pulmonary/Chest: Effort normal and breath sounds normal.   Abdominal: Soft.   Musculoskeletal: Normal range of motion.   Neurological: She is alert and oriented to person, place, and time.   Skin: Skin is warm, dry and intact.   Psychiatric: She has a normal mood and affect.       Significant Labs:  BMP:   Recent Labs   Lab 09/05/19  1800 09/06/19  0426   * 115*    141   K 3.3* 3.9    101   CO2 26 27   BUN 34* 30*   CREATININE 1.8* 1.7*   CALCIUM 9.2 9.4   MG 2.1  --      CBC:   Recent Labs   Lab 09/06/19  0426   WBC 6.22   RBC 4.40   HGB 12.9   HCT 39.9      MCV 91   MCH 29.3   MCHC 32.3     CMP:   Recent Labs   Lab 09/06/19  0426   *   CALCIUM 9.4   ALBUMIN 3.9   PROT 7.5      K 3.9   CO2 27      BUN 30*   CREATININE 1.7*   ALKPHOS 76   ALT 17   AST 17   BILITOT 0.6       Significant Diagnostics:  ECHO:   · Severe left ventricular enlargement.  · Severely decreased left ventricular systolic function. The estimated ejection fraction is 15%  · Mild right ventricular enlargement.  · Normal right ventricular systolic  function.  · Grade III (severe) left ventricular diastolic dysfunction consistent with restrictive physiology.  · Moderate biatrial enlargement.  · Mild tricuspid regurgitation.  · Mild-to-moderate mitral regurgitation.  · The estimated PA systolic pressure is 41 mm Hg  · Normal central venous pressure (3 mm Hg).  LV 6.5cm  TAPSE 3.3cm    All diagnotics and labs reviewed.    Assessment/Plan:     NYHA Score: NYHA III: marked limitation of physical activity, comfortable at rest    * Acute on chronic combined systolic and diastolic heart failure  CT does not preclude patient from advanced options. Continue with pathway. Dr. Walden has seen and will staff.         Thank you for your consult. I will follow-up with patient. Please contact us if you have any additional questions.    Yris Mallory NP  Cardiothoracic Surgery  [facility]

## 2019-09-06 NOTE — PROGRESS NOTES
"[facility]  Heart Transplant  Progress Note    Patient Name: Deborah Navas  MRN: [id number]  Admission Date: 9/4/2019  Hospital Length of Stay: 2 days  Attending Physician: Vi Bryant MD  Primary Care Provider: Primary Doctor No  Principal Problem:Acute on chronic combined systolic and diastolic heart failure    Subjective:     Interval History: Hydralazine and Isordil added yesterday; BP has been stable. Thyroid ultrasound done; see report. She reports feeling "jittery" this morning and has a headache.  Otherwise, she is feeling better on .  She walked the halls yesterday without fatigue.  She is net negative 2.1L in the last 24 hours.  CVP: 7, SVO2: 64, CO: 6.77, CI: 3.04 and SVR: 898 using an oxygen consumption of 363.  TSH and FT4 ordered     Lines:  Right IJ: 9/4/19    Continuous Infusions:   DOBUTamine 5 mcg/kg/min (09/05/19 2005)    furosemide (LASIX) 2 mg/mL infusion (non-titrating) 5 mg/hr (09/06/19 0828)     Scheduled Meds:   amiodarone  200 mg Oral BID    aspirin  81 mg Oral Daily    atorvastatin  40 mg Oral Daily    docusate sodium  100 mg Oral Daily    gabapentin  400 mg Oral TID    heparin (porcine)  5,000 Units Subcutaneous Q8H    hydrALAZINE  10 mg Oral Q8H    isosorbide dinitrate  20 mg Oral Q8H    potassium chloride  40 mEq Oral Daily    spironolactone  25 mg Oral Daily    venlafaxine  150 mg Oral Daily     PRN Meds:acetaminophen, diphenhydrAMINE, oxyCODONE-acetaminophen, promethazine, sodium chloride 0.9%, zolpidem    Review of patient's allergies indicates:   Allergen Reactions    Codeine Itching     Objective:     Vital Signs (Most Recent):  Temp: 98.4 °F (36.9 °C) (09/06/19 0746)  Pulse: 89 (09/06/19 1111)  Resp: 17 (09/06/19 0746)  BP: 110/61 (09/06/19 0746)  SpO2: 98 % (09/06/19 0746) Vital Signs (24h Range):  Temp:  [97.2 °F (36.2 °C)-98.4 °F (36.9 °C)] 98.4 °F (36.9 °C)  Pulse:  [69-89] 89  Resp:  [16-18] 17  SpO2:  [95 %-98 %] 98 %  BP: " (100-116)/(59-74) 110/61     Patient Vitals for the past 72 hrs (Last 3 readings):   Weight   09/06/19 0746 103.7 kg (228 lb 9.9 oz)   09/05/19 1500 103.7 kg (228 lb 9.9 oz)   09/05/19 0700 103.7 kg (228 lb 9.9 oz)     Body mass index is 35.05 kg/m².      Intake/Output Summary (Last 24 hours) at 9/6/2019 1114  Last data filed at 9/6/2019 0900  Gross per 24 hour   Intake 365 ml   Output 2100 ml   Net -1735 ml       Hemodynamic Parameters:  CVP:  [7 mmHg] 7 mmHg    Telemetry: reviewed  Physical Exam  Constitutional: She is oriented to person, place, and time. Laying in bed NAD  Neck: Normal range of motion. Neck supple. JVD elevation just above the clavicle   Cardiovascular: Normal rate and regular rhythm. Exam reveals no gallop and no friction rub. III/VI systolic murmur .  Pulmonary/Chest: Effort normal and breath sounds normal. She has no wheezes. She has no rales.   Abdominal: Soft. Bowel sounds are normal. There is no tenderness.   Musculoskeletal: She exhibits no edema.   Neurological: She is alert and oriented to person, place, and time.   Skin: Skin is warm and dry    Significant Labs:  CBC:  Recent Labs   Lab 09/04/19  0847 09/05/19  0432 09/06/19  0426   WBC 6.82 6.80 6.22   RBC 5.34 4.82 4.40   HGB 15.4 14.0 12.9   HCT 48.6* 42.5 39.9    303 256   MCV 91 88 91   MCH 28.8 29.0 29.3   MCHC 31.7* 32.9 32.3     BNP:  No results for input(s): BNP in the last 168 hours.    Invalid input(s): BNPTRIAGELBLO  CMP:  Recent Labs   Lab 09/04/19  0847  09/05/19  0432 09/05/19  1800 09/06/19  0426      < > 113* 147* 115*   CALCIUM 10.4   < > 9.5 9.2 9.4   ALBUMIN 3.7  --  3.9  --  3.9   PROT 8.1  --  7.7  --  7.5      < > 140 141 141   K 4.8   < > 3.8 3.3* 3.9   CO2 27   < > 25 26 27      < > 101 101 101   BUN 35*   < > 34* 34* 30*   CREATININE 1.5*   < > 1.6* 1.8* 1.7*   ALKPHOS 76  --  76  --  76   ALT 21  --  19  --  17   AST 17  --  20  --  17   BILITOT 0.5  --  0.6  --  0.6    < > = values  in this interval not displayed.      Coagulation:   Recent Labs   Lab 09/04/19  0847   INR 1.1     LDH:  No results for input(s): LDH in the last 72 hours.  Microbiology:  Microbiology Results (last 7 days)     ** No results found for the last 168 hours. **          I have reviewed all pertinent labs within the past 24 hours.    Estimated Creatinine Clearance: 50.2 mL/min (A) (based on SCr of 1.7 mg/dL (H)).    Diagnostic Results:  I have reviewed all pertinent imaging results/findings within the past 24 hours.    Assessment and Plan:     No notes on file    * Acute on chronic combined systolic and diastolic heart failure  -NICM  -Last 2D Echo done at outside facility.  EF: 20%, LVEDD: 6.8cm. Repeat echo ordered and pending  - RHC: done on admit 9/4/19 RA: 20/ 20/ 18 RV: 60/ 8/ 18 PA: 60/ 32/ 45 PWP: 43/ 70/ 40 . Cardiac output was 2.27 by Fitz. Cardiac index is 1.04 L/min/m2. O2 Sat: PA 34%. AO 95% PVR 2.2 PALMER  -Started on low dose  at 2.5 and titrated up to 5 and she is tolerating it well.  Checking electrolytes twice a day given hx of ICD shocks in setting of hypokalemia  -SVR has improved to 898 after adding Hydralazine and Isordil.  Will increase Hydralazine today  -Hypervolemic; but improving on examination today; Will decrease Lasix drip to 5mg/hr  -GDMT with Spironolactone, Hydralazine and Isordil today  -Patient is currently being evaluated for OHTx/VAD  -Heart failure pathway Step  2  -2g Na dietary restriction, 1500 mL fluid restriction, strict I/Os    Ventricular tachyarrhythmia  -continued home dose of Amiodarone  -Monitor on Telemetry  -Maintain K around 4 and Mg around 2    Pulmonary hypertension due to left heart disease  -plan for diuresis with Lasix infusion    Enlarged thyroid  -found incidentally on CT  -Ultrasound done and showed enlarged multinodular thyroid which warrants surveillance in one year.  TSH and FT4 ordered.  Will follow up and consult Endocrine if abnormal.     Abnormal CT  scan  -focal opacity found at right base.  DDx: atelectasis.  Recommending repeat CT in 3 months        ZAC Kelly  Heart Transplant  [facility]

## 2019-09-07 PROBLEM — N18.9 CKD (CHRONIC KIDNEY DISEASE): Status: ACTIVE | Noted: 2019-09-07

## 2019-09-07 LAB
ALBUMIN SERPL BCP-MCNC: 3.8 G/DL (ref 3.5–5.2)
ALP SERPL-CCNC: 78 U/L (ref 55–135)
ALT SERPL W/O P-5'-P-CCNC: 14 U/L (ref 10–44)
ANION GAP SERPL CALC-SCNC: 12 MMOL/L (ref 8–16)
APTT BLDCRRT: 25.3 SEC (ref 21–32)
AST SERPL-CCNC: 15 U/L (ref 10–40)
BASOPHILS # BLD AUTO: 0.03 K/UL (ref 0–0.2)
BASOPHILS NFR BLD: 0.4 % (ref 0–1.9)
BILIRUB SERPL-MCNC: 0.7 MG/DL (ref 0.1–1)
BUN SERPL-MCNC: 26 MG/DL (ref 6–20)
CALCIUM SERPL-MCNC: 9.2 MG/DL (ref 8.7–10.5)
CHLORIDE SERPL-SCNC: 100 MMOL/L (ref 95–110)
CO2 SERPL-SCNC: 27 MMOL/L (ref 23–29)
CREAT SERPL-MCNC: 1.8 MG/DL (ref 0.5–1.4)
CRP SERPL-MCNC: 20.9 MG/L (ref 0–8.2)
DIFFERENTIAL METHOD: ABNORMAL
EOSINOPHIL # BLD AUTO: 0.2 K/UL (ref 0–0.5)
EOSINOPHIL NFR BLD: 2.1 % (ref 0–8)
ERYTHROCYTE [DISTWIDTH] IN BLOOD BY AUTOMATED COUNT: 14.1 % (ref 11.5–14.5)
EST. GFR  (AFRICAN AMERICAN): 37.6 ML/MIN/1.73 M^2
EST. GFR  (NON AFRICAN AMERICAN): 32.6 ML/MIN/1.73 M^2
FIBRINOGEN PPP-MCNC: 501 MG/DL (ref 182–366)
GLUCOSE SERPL-MCNC: 125 MG/DL (ref 70–110)
HCT VFR BLD AUTO: 37.8 % (ref 37–48.5)
HCYS SERPL-SCNC: 23.7 UMOL/L (ref 4–15.5)
HGB BLD-MCNC: 12.2 G/DL (ref 12–16)
IMM GRANULOCYTES # BLD AUTO: 0.03 K/UL (ref 0–0.04)
IMM GRANULOCYTES NFR BLD AUTO: 0.4 % (ref 0–0.5)
INR PPP: 1 (ref 0.8–1.2)
LYMPHOCYTES # BLD AUTO: 1.2 K/UL (ref 1–4.8)
LYMPHOCYTES NFR BLD: 17.2 % (ref 18–48)
MAGNESIUM SERPL-MCNC: 2.1 MG/DL (ref 1.6–2.6)
MCH RBC QN AUTO: 29.1 PG (ref 27–31)
MCHC RBC AUTO-ENTMCNC: 32.3 G/DL (ref 32–36)
MCV RBC AUTO: 90 FL (ref 82–98)
MONOCYTES # BLD AUTO: 0.7 K/UL (ref 0.3–1)
MONOCYTES NFR BLD: 10.2 % (ref 4–15)
NEUTROPHILS # BLD AUTO: 4.9 K/UL (ref 1.8–7.7)
NEUTROPHILS NFR BLD: 69.7 % (ref 38–73)
NRBC BLD-RTO: 0 /100 WBC
PHOSPHATE SERPL-MCNC: 3.9 MG/DL (ref 2.7–4.5)
PLATELET # BLD AUTO: 267 K/UL (ref 150–350)
PMV BLD AUTO: 9.7 FL (ref 9.2–12.9)
POTASSIUM SERPL-SCNC: 3.5 MMOL/L (ref 3.5–5.1)
PROT SERPL-MCNC: 7.1 G/DL (ref 6–8.4)
PROTHROMBIN TIME: 10.5 SEC (ref 9–12.5)
RBC # BLD AUTO: 4.19 M/UL (ref 4–5.4)
RPR SER QL: NORMAL
SODIUM SERPL-SCNC: 139 MMOL/L (ref 136–145)
VARICELLA INTERPRETATION: POSITIVE
VARICELLA ZOSTER IGG: 3.57 ISR (ref 0–0.9)
WBC # BLD AUTO: 7.03 K/UL (ref 3.9–12.7)

## 2019-09-07 PROCEDURE — 99233 SBSQ HOSP IP/OBS HIGH 50: CPT | Mod: NTX,,, | Performed by: INTERNAL MEDICINE

## 2019-09-07 PROCEDURE — 25000003 PHARM REV CODE 250: Mod: NTX | Performed by: INTERNAL MEDICINE

## 2019-09-07 PROCEDURE — 85610 PROTHROMBIN TIME: CPT | Mod: NTX

## 2019-09-07 PROCEDURE — 80053 COMPREHEN METABOLIC PANEL: CPT | Mod: NTX

## 2019-09-07 PROCEDURE — 83735 ASSAY OF MAGNESIUM: CPT | Mod: NTX

## 2019-09-07 PROCEDURE — 85240 CLOT FACTOR VIII AHG 1 STAGE: CPT | Mod: NTX

## 2019-09-07 PROCEDURE — 99233 PR SUBSEQUENT HOSPITAL CARE,LEVL III: ICD-10-PCS | Mod: NTX,,, | Performed by: INTERNAL MEDICINE

## 2019-09-07 PROCEDURE — 85397 CLOTTING FUNCT ACTIVITY: CPT | Mod: NTX

## 2019-09-07 PROCEDURE — 63600175 PHARM REV CODE 636 W HCPCS: Mod: NTX | Performed by: PHYSICIAN ASSISTANT

## 2019-09-07 PROCEDURE — 97116 GAIT TRAINING THERAPY: CPT | Mod: NTX

## 2019-09-07 PROCEDURE — 63600175 PHARM REV CODE 636 W HCPCS: Mod: NTX | Performed by: INTERNAL MEDICINE

## 2019-09-07 PROCEDURE — 85246 CLOT FACTOR VIII VW ANTIGEN: CPT | Mod: NTX

## 2019-09-07 PROCEDURE — 84100 ASSAY OF PHOSPHORUS: CPT | Mod: NTX

## 2019-09-07 PROCEDURE — 25000003 PHARM REV CODE 250: Mod: NTX | Performed by: PHYSICIAN ASSISTANT

## 2019-09-07 PROCEDURE — 85301 ANTITHROMBIN III ANTIGEN: CPT | Mod: NTX

## 2019-09-07 PROCEDURE — 83090 ASSAY OF HOMOCYSTEINE: CPT | Mod: NTX

## 2019-09-07 PROCEDURE — 85384 FIBRINOGEN ACTIVITY: CPT | Mod: NTX

## 2019-09-07 PROCEDURE — 97161 PT EVAL LOW COMPLEX 20 MIN: CPT | Mod: NTX

## 2019-09-07 PROCEDURE — 85730 THROMBOPLASTIN TIME PARTIAL: CPT | Mod: NTX

## 2019-09-07 PROCEDURE — 20600001 HC STEP DOWN PRIVATE ROOM: Mod: NTX

## 2019-09-07 PROCEDURE — 86140 C-REACTIVE PROTEIN: CPT | Mod: NTX

## 2019-09-07 PROCEDURE — 85247 CLOT FACTOR VIII MULTIMETRIC: CPT | Mod: NTX

## 2019-09-07 PROCEDURE — 82955 ASSAY OF G6PD ENZYME: CPT | Mod: NTX

## 2019-09-07 PROCEDURE — 85025 COMPLETE CBC W/AUTO DIFF WBC: CPT | Mod: NTX

## 2019-09-07 PROCEDURE — 86147 CARDIOLIPIN ANTIBODY EA IG: CPT | Mod: 59,NTX

## 2019-09-07 RX ORDER — GUAIFENESIN 600 MG/1
600 TABLET, EXTENDED RELEASE ORAL 2 TIMES DAILY
Status: DISCONTINUED | OUTPATIENT
Start: 2019-09-07 | End: 2019-09-10

## 2019-09-07 RX ORDER — POTASSIUM CHLORIDE 20 MEQ/1
20 TABLET, EXTENDED RELEASE ORAL ONCE
Status: COMPLETED | OUTPATIENT
Start: 2019-09-07 | End: 2019-09-07

## 2019-09-07 RX ORDER — HYDRALAZINE HYDROCHLORIDE 50 MG/1
50 TABLET, FILM COATED ORAL EVERY 8 HOURS
Status: DISCONTINUED | OUTPATIENT
Start: 2019-09-07 | End: 2019-09-09

## 2019-09-07 RX ORDER — POTASSIUM CHLORIDE 20 MEQ/1
40 TABLET, EXTENDED RELEASE ORAL ONCE
Status: COMPLETED | OUTPATIENT
Start: 2019-09-07 | End: 2019-09-07

## 2019-09-07 RX ADMIN — POTASSIUM CHLORIDE 40 MEQ: 20 TABLET, EXTENDED RELEASE ORAL at 08:09

## 2019-09-07 RX ADMIN — DOCUSATE SODIUM 100 MG: 100 CAPSULE, LIQUID FILLED ORAL at 08:09

## 2019-09-07 RX ADMIN — HYDRALAZINE HYDROCHLORIDE 50 MG: 50 TABLET ORAL at 01:09

## 2019-09-07 RX ADMIN — GABAPENTIN 400 MG: 400 CAPSULE ORAL at 08:09

## 2019-09-07 RX ADMIN — HYDRALAZINE HYDROCHLORIDE 50 MG: 50 TABLET ORAL at 10:09

## 2019-09-07 RX ADMIN — POTASSIUM CHLORIDE 20 MEQ: 20 TABLET, EXTENDED RELEASE ORAL at 03:09

## 2019-09-07 RX ADMIN — GABAPENTIN 400 MG: 400 CAPSULE ORAL at 10:09

## 2019-09-07 RX ADMIN — ISOSORBIDE DINITRATE 20 MG: 20 TABLET ORAL at 10:09

## 2019-09-07 RX ADMIN — HEPARIN SODIUM 5000 UNITS: 5000 INJECTION, SOLUTION INTRAVENOUS; SUBCUTANEOUS at 01:09

## 2019-09-07 RX ADMIN — HYDRALAZINE HYDROCHLORIDE 25 MG: 25 TABLET, FILM COATED ORAL at 05:09

## 2019-09-07 RX ADMIN — POTASSIUM CHLORIDE 40 MEQ: 20 TABLET, EXTENDED RELEASE ORAL at 01:09

## 2019-09-07 RX ADMIN — ISOSORBIDE DINITRATE 20 MG: 20 TABLET ORAL at 01:09

## 2019-09-07 RX ADMIN — VENLAFAXINE 150 MG: 37.5 TABLET ORAL at 08:09

## 2019-09-07 RX ADMIN — ASPIRIN 81 MG: 81 TABLET, COATED ORAL at 08:09

## 2019-09-07 RX ADMIN — ATORVASTATIN CALCIUM 40 MG: 20 TABLET, FILM COATED ORAL at 08:09

## 2019-09-07 RX ADMIN — ZOLPIDEM TARTRATE 10 MG: 5 TABLET ORAL at 10:09

## 2019-09-07 RX ADMIN — GUAIFENESIN 600 MG: 600 TABLET, EXTENDED RELEASE ORAL at 10:09

## 2019-09-07 RX ADMIN — HEPARIN SODIUM 5000 UNITS: 5000 INJECTION, SOLUTION INTRAVENOUS; SUBCUTANEOUS at 10:09

## 2019-09-07 RX ADMIN — GUAIFENESIN 600 MG: 600 TABLET, EXTENDED RELEASE ORAL at 01:09

## 2019-09-07 RX ADMIN — AMIODARONE HYDROCHLORIDE 200 MG: 200 TABLET ORAL at 08:09

## 2019-09-07 RX ADMIN — ISOSORBIDE DINITRATE 20 MG: 20 TABLET ORAL at 05:09

## 2019-09-07 RX ADMIN — GABAPENTIN 400 MG: 400 CAPSULE ORAL at 03:09

## 2019-09-07 RX ADMIN — SPIRONOLACTONE 25 MG: 25 TABLET, FILM COATED ORAL at 08:09

## 2019-09-07 RX ADMIN — HEPARIN SODIUM 5000 UNITS: 5000 INJECTION, SOLUTION INTRAVENOUS; SUBCUTANEOUS at 05:09

## 2019-09-07 RX ADMIN — FUROSEMIDE 5 MG/HR: 10 INJECTION, SOLUTION INTRAMUSCULAR; INTRAVENOUS at 05:09

## 2019-09-07 RX ADMIN — DOBUTAMINE IN DEXTROSE 5 MCG/KG/MIN: 200 INJECTION, SOLUTION INTRAVENOUS at 08:09

## 2019-09-07 RX ADMIN — AMIODARONE HYDROCHLORIDE 200 MG: 200 TABLET ORAL at 10:09

## 2019-09-07 NOTE — CONSULTS
"  [facility]  Adult Nutrition  Consult Note    SUMMARY     Recommendations    Recommendation/Intervention:   1. Continue current diet order as tolerated.   2. Provided Low Sodium handout. Will monitor.   Goals: Pt to receive % EEN and EPN by RD follow-up.   Nutrition Goal Status: new  Communication of RD Recs: reviewed with RN    Reason for Assessment    Reason For Assessment: consult  Diagnosis: cardiac disease(LVAD/OHTx w/u)  Relevant Medical History: HF  General Information Comments: Pt reports good appetite and good PO intake. Consuming % of meals. Pt declines any recent wt loss, confirmed by chart review (stable wt X 5+yrs). Pt appears nourished, NFPE not indicated at this time.   Nutrition Discharge Planning: Provided Low Sodium handout. Pt following at home already and has no questions.     Nutrition/Diet History    Patient Reported Diet/Restrictions/Preferences: low salt  Spiritual, Cultural Beliefs, Amish Practices, Values that Affect Care: no  Factors Affecting Nutritional Intake: None identified at this time    Anthropometrics    Temp: 98.4 °F (36.9 °C)  Height Method: Stated  Height: 5' 7.72" (172 cm)  Height (inches): 67.72 in  Weight Method: Standard Scale  Weight: 103.4 kg (227 lb 15.3 oz)  Weight (lb): 227.96 lb  Ideal Body Weight (IBW), Female: 138.6 lb  % Ideal Body Weight, Female (lb): 164.95   BMI (Calculated): 35.1  BMI Grade: 35 - 39.9 - obesity - grade II  Usual Body Weight (UBW), k.5 kg  % Usual Body Weight: 99.15     Lab/Procedures/Meds    Pertinent Labs Reviewed: reviewed  Pertinent Labs Comments: BUN 26  Pertinent Medications Reviewed: reviewed  Pertinent Medications Comments: docusate, lasix    Estimated/Assessed Needs    Weight Used For Calorie Calculations: 103.4 kg (227 lb 15.3 oz)  Energy Calorie Requirements (kcal): 2128  Energy Need Method: Amy-[employer] [place](1.25 PAL)  Protein Requirements: 103-124g (1-1.2g/kg)  Weight Used For Protein " Calculations: 103.4 kg (227 lb 15.3 oz)  Fluid Requirements (mL): Per MD  RDA Method (mL): 2128     Nutrition Prescription Ordered    Current Diet Order: Cardiac, Fluid 1500mL    Evaluation of Received Nutrient/Fluid Intake    I/O: -4.8L since admit  Comments: LBM 9/3  % Intake of Estimated Energy Needs: 75 - 100 %  % Meal Intake: 75 - 100 %    Nutrition Risk    Level of Risk/Frequency of Follow-up: (1X/week)     Assessment and Plan    Nutrition Problem  Increased nutrient needs (protein)    Related to (etiology):   Physiological needs    Signs and Symptoms (as evidenced by):   HF, LVAD w/u     Interventions(treatment strategy):  Collaboration of nutrition care with other providers    Nutrition Diagnosis Status:   New    Monitor and Evaluation    Food and Nutrient Intake: energy intake, food and beverage intake  Food and Nutrient Adminstration: diet order  Knowledge/Beliefs/Attitudes: food and nutrition knowledge/skill  Anthropometric Measurements: weight, weight change  Biochemical Data, Medical Tests and Procedures: other (specify)(All labs)  Nutrition-Focused Physical Findings: overall appearance     Nutrition Follow-Up    RD Follow-up?: Yes

## 2019-09-07 NOTE — ASSESSMENT & PLAN NOTE
NICM EF: 20%, LVEDD: 6.8cm. Repeat echo ordered and pending  RHC: done on admit 9/4/19 RA: 20/ 20/ 18 RV: 60/ 8/ 18 PA: 60/ 32/ 45 PWP: 43/ 70/ 40 . Cardiac output was 2.27 by Fitz. Cardiac index is 1.04 L/min/m2. O2 Sat: PA 34%. AO 95% PVR 2.2 PALMER  - Increase hydralazine 50 mg TID  - Continue isosorbide dinitrate 20 mg TID  - Continue  5 mcg/kg/min  - Continue lasix 5 mg/hr gtt  - Patient is currently being evaluated for OHTx/VAD  - Heart failure pathway Step 2  - 2g Na dietary restriction, 1500 mL fluid restriction, strict I/Os

## 2019-09-07 NOTE — SUBJECTIVE & OBJECTIVE
Interval History: patient reporting green mucous and coughing. No fevers overnight. Good urine output.    Continuous Infusions:   DOBUTamine 5 mcg/kg/min (09/07/19 0841)    furosemide (LASIX) 2 mg/mL infusion (non-titrating) 5 mg/hr (09/06/19 0828)     Scheduled Meds:   amiodarone  200 mg Oral BID    aspirin  81 mg Oral Daily    atorvastatin  40 mg Oral Daily    docusate sodium  100 mg Oral Daily    gabapentin  400 mg Oral TID    guaiFENesin  600 mg Oral BID    heparin (porcine)  5,000 Units Subcutaneous Q8H    hydrALAZINE  50 mg Oral Q8H    isosorbide dinitrate  20 mg Oral Q8H    potassium chloride  40 mEq Oral Daily    spironolactone  25 mg Oral Daily    venlafaxine  150 mg Oral Daily     PRN Meds:acetaminophen, diphenhydrAMINE, oxyCODONE-acetaminophen, promethazine, sodium chloride 0.9%, zolpidem    Review of patient's allergies indicates:   Allergen Reactions    Codeine Itching     Objective:     Vital Signs (Most Recent):  Temp: 97.2 °F (36.2 °C) (09/07/19 1133)  Pulse: 94 (09/07/19 1133)  Resp: 16 (09/07/19 1133)  BP: 119/77 (09/07/19 1133)  SpO2: 95 % (09/07/19 1133) Vital Signs (24h Range):  Temp:  [97.2 °F (36.2 °C)-98.4 °F (36.9 °C)] 97.2 °F (36.2 °C)  Pulse:  [] 94  Resp:  [16-17] 16  SpO2:  [90 %-97 %] 95 %  BP: (107-119)/(59-77) 119/77     Patient Vitals for the past 72 hrs (Last 3 readings):   Weight   09/07/19 0706 103.4 kg (227 lb 15.3 oz)   09/06/19 0746 103.7 kg (228 lb 9.9 oz)   09/05/19 1500 103.7 kg (228 lb 9.9 oz)     Body mass index is 34.95 kg/m².      Intake/Output Summary (Last 24 hours) at 9/7/2019 1149  Last data filed at 9/7/2019 0900  Gross per 24 hour   Intake 730 ml   Output 2100 ml   Net -1370 ml       Hemodynamic Parameters:  CVP:  [5 mmHg] 5 mmHg    Physical Exam   Constitutional: She appears well-developed and well-nourished.   HENT:   Head: Normocephalic and atraumatic.   Eyes: Conjunctivae are normal.   Neck: No JVD present.   Below clavicle only seen with  RUQ pressure   Cardiovascular: Normal rate and regular rhythm.   CVC RIJ   Pulmonary/Chest: Effort normal and breath sounds normal.   Abdominal: Soft. She exhibits no distension.   Musculoskeletal: Normal range of motion.   Neurological: She is alert.   Skin: Skin is warm and dry. Capillary refill takes less than 2 seconds.   Nursing note and vitals reviewed.      Significant Labs:  CBC:  Recent Labs   Lab 09/05/19  0432 09/06/19  0426 09/07/19  0517   WBC 6.80 6.22 7.03   RBC 4.82 4.40 4.19   HGB 14.0 12.9 12.2   HCT 42.5 39.9 37.8    256 267   MCV 88 91 90   MCH 29.0 29.3 29.1   MCHC 32.9 32.3 32.3     BNP:  Recent Labs   Lab 09/06/19  1834   *     CMP:  Recent Labs   Lab 09/05/19 0432  09/06/19  0426 09/06/19  1833 09/07/19  0517   *   < > 115* 146* 125*   CALCIUM 9.5   < > 9.4 9.9 9.2   ALBUMIN 3.9  --  3.9  --  3.8   PROT 7.7  --  7.5  --  7.1      < > 141 138 139   K 3.8   < > 3.9 4.1 3.5   CO2 25   < > 27 22* 27      < > 101 101 100   BUN 34*   < > 30* 30* 26*   CREATININE 1.6*   < > 1.7* 2.1* 1.8*   ALKPHOS 76  --  76  --  78   ALT 19  --  17  --  14   AST 20  --  17  --  15   BILITOT 0.6  --  0.6  --  0.7    < > = values in this interval not displayed.      Coagulation:   Recent Labs   Lab 09/04/19  0847 09/07/19  0517   INR 1.1 1.0   APTT  --  25.3     LDH:  Recent Labs   Lab 09/06/19  1833        Microbiology:  Microbiology Results (last 7 days)     ** No results found for the last 168 hours. **          I have reviewed all pertinent labs within the past 24 hours.    Estimated Creatinine Clearance: 47.3 mL/min (A) (based on SCr of 1.8 mg/dL (H)).    Diagnostic Results:  I have reviewed and interpreted all pertinent imaging results/findings within the past 24 hours.

## 2019-09-07 NOTE — PROGRESS NOTES
[facility]  Heart Transplant  Progress Note    Patient Name: Deborah Navas  MRN: [id number]  Admission Date: 9/4/2019  Hospital Length of Stay: 3 days  Attending Physician: Vi Bryant MD  Primary Care Provider: Primary Doctor No  Principal Problem:Acute on chronic combined systolic and diastolic heart failure    Subjective:     Interval History: patient reporting green mucous and coughing. No fevers overnight. Good urine output.    Continuous Infusions:   DOBUTamine 5 mcg/kg/min (09/07/19 0841)    furosemide (LASIX) 2 mg/mL infusion (non-titrating) 5 mg/hr (09/06/19 0828)     Scheduled Meds:   amiodarone  200 mg Oral BID    aspirin  81 mg Oral Daily    atorvastatin  40 mg Oral Daily    docusate sodium  100 mg Oral Daily    gabapentin  400 mg Oral TID    guaiFENesin  600 mg Oral BID    heparin (porcine)  5,000 Units Subcutaneous Q8H    hydrALAZINE  50 mg Oral Q8H    isosorbide dinitrate  20 mg Oral Q8H    potassium chloride  40 mEq Oral Daily    spironolactone  25 mg Oral Daily    venlafaxine  150 mg Oral Daily     PRN Meds:acetaminophen, diphenhydrAMINE, oxyCODONE-acetaminophen, promethazine, sodium chloride 0.9%, zolpidem    Review of patient's allergies indicates:   Allergen Reactions    Codeine Itching     Objective:     Vital Signs (Most Recent):  Temp: 97.2 °F (36.2 °C) (09/07/19 1133)  Pulse: 94 (09/07/19 1133)  Resp: 16 (09/07/19 1133)  BP: 119/77 (09/07/19 1133)  SpO2: 95 % (09/07/19 1133) Vital Signs (24h Range):  Temp:  [97.2 °F (36.2 °C)-98.4 °F (36.9 °C)] 97.2 °F (36.2 °C)  Pulse:  [] 94  Resp:  [16-17] 16  SpO2:  [90 %-97 %] 95 %  BP: (107-119)/(59-77) 119/77     Patient Vitals for the past 72 hrs (Last 3 readings):   Weight   09/07/19 0706 103.4 kg (227 lb 15.3 oz)   09/06/19 0746 103.7 kg (228 lb 9.9 oz)   09/05/19 1500 103.7 kg (228 lb 9.9 oz)     Body mass index is 34.95 kg/m².      Intake/Output Summary (Last 24 hours) at 9/7/2019 1149  Last  data filed at 9/7/2019 0900  Gross per 24 hour   Intake 730 ml   Output 2100 ml   Net -1370 ml       Hemodynamic Parameters:  CVP:  [5 mmHg] 5 mmHg    Physical Exam   Constitutional: She appears well-developed and well-nourished.   HENT:   Head: Normocephalic and atraumatic.   Eyes: Conjunctivae are normal.   Neck: No JVD present.   Below clavicle only seen with RUQ pressure   Cardiovascular: Normal rate and regular rhythm.   CVC RIJ   Pulmonary/Chest: Effort normal and breath sounds normal.   Abdominal: Soft. She exhibits no distension.   Musculoskeletal: Normal range of motion.   Neurological: She is alert.   Skin: Skin is warm and dry. Capillary refill takes less than 2 seconds.   Nursing note and vitals reviewed.      Significant Labs:  CBC:  Recent Labs   Lab 09/05/19 0432 09/06/19 0426 09/07/19 0517   WBC 6.80 6.22 7.03   RBC 4.82 4.40 4.19   HGB 14.0 12.9 12.2   HCT 42.5 39.9 37.8    256 267   MCV 88 91 90   MCH 29.0 29.3 29.1   MCHC 32.9 32.3 32.3     BNP:  Recent Labs   Lab 09/06/19  1834   *     CMP:  Recent Labs   Lab 09/05/19 0432  09/06/19  0426 09/06/19  1833 09/07/19  0517   *   < > 115* 146* 125*   CALCIUM 9.5   < > 9.4 9.9 9.2   ALBUMIN 3.9  --  3.9  --  3.8   PROT 7.7  --  7.5  --  7.1      < > 141 138 139   K 3.8   < > 3.9 4.1 3.5   CO2 25   < > 27 22* 27      < > 101 101 100   BUN 34*   < > 30* 30* 26*   CREATININE 1.6*   < > 1.7* 2.1* 1.8*   ALKPHOS 76  --  76  --  78   ALT 19  --  17  --  14   AST 20  --  17  --  15   BILITOT 0.6  --  0.6  --  0.7    < > = values in this interval not displayed.      Coagulation:   Recent Labs   Lab 09/04/19  0847 09/07/19 0517   INR 1.1 1.0   APTT  --  25.3     LDH:  Recent Labs   Lab 09/06/19  1833        Microbiology:  Microbiology Results (last 7 days)     ** No results found for the last 168 hours. **          I have reviewed all pertinent labs within the past 24 hours.    Estimated Creatinine Clearance: 47.3  mL/min (A) (based on SCr of 1.8 mg/dL (H)).    Diagnostic Results:  I have reviewed and interpreted all pertinent imaging results/findings within the past 24 hours.    Assessment and Plan:     No notes on file    * Acute on chronic combined systolic and diastolic heart failure  NICM EF: 20%, LVEDD: 6.8cm. Repeat echo ordered and pending  RHC: done on admit 9/4/19 RA: 20/ 20/ 18 RV: 60/ 8/ 18 PA: 60/ 32/ 45 PWP: 43/ 70/ 40 . Cardiac output was 2.27 by Fitz. Cardiac index is 1.04 L/min/m2. O2 Sat: PA 34%. AO 95% PVR 2.2 PALMER  - Increase hydralazine 50 mg TID  - Continue isosorbide dinitrate 20 mg TID  - Continue  5 mcg/kg/min  - Continue lasix 5 mg/hr gtt  - Patient is currently being evaluated for OHTx/VAD  - Heart failure pathway Step 2  - 2g Na dietary restriction, 1500 mL fluid restriction, strict I/Os    GLO vs CKD vs GLO on CKD  - Cr mildly improved from 2.1 to 1.8 with diuresis  - Continue to monitor   - Renally dose meds  - Avoid nephrotoxic agents    Abnormal CT scan  -focal opacity found at right base.  DDx: atelectasis.  Recommending repeat CT in 3 months    Enlarged thyroid  Ultrasound done and showed enlarged multinodular thyroid which warrants surveillance in one year.   - TSH and fT4 wnl  - Needs endo f/u -- ?FNA    Ventricular tachyarrhythmia  - Amiodarone 200 mg PO BID  - Goal K >4 and Mg >2  - Monitor on telemetry    Pulmonary hypertension due to left heart disease  -plan for diuresis with Lasix infusion        Jatinder Alves MD  Heart Transplant  [facility]

## 2019-09-07 NOTE — ASSESSMENT & PLAN NOTE
Ultrasound done and showed enlarged multinodular thyroid which warrants surveillance in one year.   - TSH and fT4 wnl  - Needs endo f/u -- ?FNA

## 2019-09-07 NOTE — PLAN OF CARE
Problem: Adult Inpatient Plan of Care  Goal: Plan of Care Review  Outcome: Ongoing (interventions implemented as appropriate)  Recommendations    Recommendation/Intervention:   1. Continue current diet order as tolerated.   2. Provided Low Sodium handout. Will monitor.   Goals: Pt to receive % EEN and EPN by RD follow-up.

## 2019-09-07 NOTE — PROGRESS NOTES
Unable to collect labs  Quantiferon Gold TB and Platelet. Labs have to be done Monday-Friday. Will pass on to dayshift RN

## 2019-09-07 NOTE — PLAN OF CARE
Problem: Adult Inpatient Plan of Care  Goal: Plan of Care Review  Patient remains free from falls and injuries through out shift. Patient AAO and VSS. Patient denies chest pain and SOB. No c/o of headaches at this time.  gtt and Lasix gtt infusing as ordered. K- 3.5, PO replacement given as ordered. LVAD workup continued. Plan of care reviewed with patient. Patient verbalizes understanding of plan.  Will continue to monitor.

## 2019-09-07 NOTE — PLAN OF CARE
Problem: Adult Inpatient Plan of Care  Goal: Plan of Care Review  Outcome: Ongoing (interventions implemented as appropriate)  Patient remained free of injuries, falls and trauma. VS stable. No complaints of pain. Lasix decreased to 5mg/hr. Dobutamine infusing at prescribed rate. Advanced to step 2 of the pathway. Pt ambulated the hallway x3 tonight. Pt has intermittent twitching, team aware.  Strict I/O being monitored. Falls risk maintained. Plan of care reviewed with pt. Pt verbalized understanding. All questions and concerns addressed. Will continue to monitor.

## 2019-09-07 NOTE — PT/OT/SLP EVAL
"Physical Therapy Evaluation and Discharge Note    Patient Name:  Deborah Navas   MRN:  [id number]    Recommendations:     Discharge Recommendations:  home   Discharge Equipment Recommendations: none   Barriers to discharge: None    Assessment:     Deborah Navas is a 48 y.o. female admitted with a medical diagnosis of Acute on chronic combined systolic and diastolic heart failure.    Upon evaluation, pt is I with all functional mobility assessed.  Pt able to complete 6MWT, though testing indicates deficits in aerobic capacity, results below.  At this time, patient is functioning at their prior level of function and does not require further acute PT services.     Recent Surgery: Procedure(s) (LRB):  INSERTION, CATHETER, RIGHT HEART (Right) 3 Days Post-Op    Plan:     During this hospitalization, patient does not require further acute PT services.  Please re-consult if situation changes.      Subjective     Chief Complaint: mild LEGER  Patient/Family Comments/goals: "I'll be an easy patient."  Pain/Comfort:  · Pain Rating 1: 0/10    Patients cultural, spiritual, Mandaen conflicts given the current situation: no    Living Environment:  Pt lives alone, with 2 cats, apartment, 2 ZURI.   Prior to admission, patients level of function was I with all functional mobility and ADLs.  Equipment used at home: none.  DME owned (not currently used): none.  Upon discharge, patient will have assistance from mother.    Objective:     Unable to communicate with nursing prior to session, attempted 3xs, nurse did not  spectralink.  Patient found up in chair with central line upon PT entry to room.    General Precautions: Standard, fall   Orthopedic Precautions:N/A   Braces: N/A     Exams:  · Cognitive Exam:  Patient is oriented to Person, Place, Time and Situation  · Fine Motor Coordination:    · -       Intact  Left hand thumb/finger opposition skills and Right hand thumb/finger opposition skills  · Gross " Motor Coordination:  WFL  · Postural Exam:  Patient presented with the following abnormalities:    · -       No postural abnormalities identified  · Sensation:    · -       Impaired  light/touch reports of decreased sensation of dorsal aspect of the R foot  · Skin Integrity/Edema:      · -       Edema: None noted B LEs  · RUE ROM: WFL  · RUE Strength: WFL  · LUE ROM: WFL  · LUE Strength: WFL  · RLE ROM: WFL  · RLE Strength: WFL  · LLE ROM: WFL  · LLE Strength: WFL    Functional Mobility:  · Transfers:     · Sit to Stand:  independence with no AD  · Bed to Chair: independence with  no AD  using  Stand Pivot  · Gait: Pt amb >950', I, without AD, no episodes of LOB, mild LEGER noted  · Balance: I: dynamic standing balance without AD    AM-PAC 6 CLICK MOBILITY  Total Score:24       Therapeutic Activities and Exercises:   Whiteboard updated  6MWT: Pt amb 870' = 265.18m, 6/10 RPE following  Gait speed: 0.74 m/s    AM-PAC 6 CLICK MOBILITY  Total Score:24     Patient left up in chair with all lines intact and call button in reach.    GOALS:   Multidisciplinary Problems     Physical Therapy Goals     Not on file                History:     Past Medical History:   Diagnosis Date    Fractures     History of ventricular fibrillation 9/4/2019    History of ventricular tachycardia 9/4/2019    Hyperlipidemia     Migraine     Osteoarthritis        Past Surgical History:   Procedure Laterality Date    BACK SURGERY      2007    BONE GRAFT Left     from Left hip to Left FA    eardrum reconstruction  1980    ELBOW SURGERY Left [id number]    FOREARM FRACTURE SURGERY Bilateral [id number]    multiple surgeries    INSERTION OF IMPLANTABLE CARDIOVERTER-DEFIBRILLATOR (ICD) GENERATOR WITH TWO EXISTING LEADS      INSERTION OF PACEMAKER Left     INSERTION, CATHETER, RIGHT HEART Right 9/4/2019    Performed by [facility] at [facility] C[facility]    LUMBAR FUSION  2007    L4-L5    SINUS SURGERY Right 1994    with lymph nodes     TEMPOROMANDIBULAR JOINT SURGERY Right 1988    TONSILLECTOMY      TYMPANOSTOMY TUBE PLACEMENT  1971- 1979    multiple tube placements       Time Tracking:     PT Received On: 09/07/19  PT Start Time: 1105     PT Stop Time: 1125  PT Total Time (min): 20 min     Billable Minutes: Evaluation 8 and Gait Training 10      Steffanie Coleman, PT  09/07/2019

## 2019-09-08 LAB
ALBUMIN SERPL BCP-MCNC: 3.9 G/DL (ref 3.5–5.2)
ALP SERPL-CCNC: 86 U/L (ref 55–135)
ALT SERPL W/O P-5'-P-CCNC: 15 U/L (ref 10–44)
ANION GAP SERPL CALC-SCNC: 10 MMOL/L (ref 8–16)
AST SERPL-CCNC: 19 U/L (ref 10–40)
BASOPHILS # BLD AUTO: 0.04 K/UL (ref 0–0.2)
BASOPHILS NFR BLD: 0.6 % (ref 0–1.9)
BILIRUB SERPL-MCNC: 0.7 MG/DL (ref 0.1–1)
BUN SERPL-MCNC: 23 MG/DL (ref 6–20)
CALCIUM SERPL-MCNC: 9.6 MG/DL (ref 8.7–10.5)
CHLORIDE SERPL-SCNC: 98 MMOL/L (ref 95–110)
CK SERPL-CCNC: 49 U/L (ref 20–180)
CO2 SERPL-SCNC: 27 MMOL/L (ref 23–29)
CREAT SERPL-MCNC: 1.7 MG/DL (ref 0.5–1.4)
DIFFERENTIAL METHOD: ABNORMAL
EOSINOPHIL # BLD AUTO: 0.2 K/UL (ref 0–0.5)
EOSINOPHIL NFR BLD: 3.3 % (ref 0–8)
ERYTHROCYTE [DISTWIDTH] IN BLOOD BY AUTOMATED COUNT: 14.4 % (ref 11.5–14.5)
EST. GFR  (AFRICAN AMERICAN): 40.2 ML/MIN/1.73 M^2
EST. GFR  (NON AFRICAN AMERICAN): 34.9 ML/MIN/1.73 M^2
GLUCOSE SERPL-MCNC: 119 MG/DL (ref 70–110)
HCT VFR BLD AUTO: 37.3 % (ref 37–48.5)
HGB BLD-MCNC: 12.1 G/DL (ref 12–16)
IMM GRANULOCYTES # BLD AUTO: 0.04 K/UL (ref 0–0.04)
IMM GRANULOCYTES NFR BLD AUTO: 0.6 % (ref 0–0.5)
LYMPHOCYTES # BLD AUTO: 1.4 K/UL (ref 1–4.8)
LYMPHOCYTES NFR BLD: 19.5 % (ref 18–48)
MAGNESIUM SERPL-MCNC: 2.3 MG/DL (ref 1.6–2.6)
MCH RBC QN AUTO: 28.7 PG (ref 27–31)
MCHC RBC AUTO-ENTMCNC: 32.4 G/DL (ref 32–36)
MCV RBC AUTO: 89 FL (ref 82–98)
MONOCYTES # BLD AUTO: 0.7 K/UL (ref 0.3–1)
MONOCYTES NFR BLD: 10.3 % (ref 4–15)
NEUTROPHILS # BLD AUTO: 4.7 K/UL (ref 1.8–7.7)
NEUTROPHILS NFR BLD: 65.7 % (ref 38–73)
NRBC BLD-RTO: 0 /100 WBC
PHOSPHATE SERPL-MCNC: 3.6 MG/DL (ref 2.7–4.5)
PLATELET # BLD AUTO: 279 K/UL (ref 150–350)
PMV BLD AUTO: 9.7 FL (ref 9.2–12.9)
POTASSIUM SERPL-SCNC: 3.7 MMOL/L (ref 3.5–5.1)
PROT SERPL-MCNC: 7.3 G/DL (ref 6–8.4)
RBC # BLD AUTO: 4.21 M/UL (ref 4–5.4)
SODIUM SERPL-SCNC: 135 MMOL/L (ref 136–145)
WBC # BLD AUTO: 7.19 K/UL (ref 3.9–12.7)

## 2019-09-08 PROCEDURE — 85025 COMPLETE CBC W/AUTO DIFF WBC: CPT | Mod: NTX

## 2019-09-08 PROCEDURE — 20600001 HC STEP DOWN PRIVATE ROOM: Mod: NTX

## 2019-09-08 PROCEDURE — 80053 COMPREHEN METABOLIC PANEL: CPT | Mod: NTX

## 2019-09-08 PROCEDURE — 99233 SBSQ HOSP IP/OBS HIGH 50: CPT | Mod: NTX,,, | Performed by: INTERNAL MEDICINE

## 2019-09-08 PROCEDURE — 25000003 PHARM REV CODE 250: Mod: NTX | Performed by: INTERNAL MEDICINE

## 2019-09-08 PROCEDURE — 63600175 PHARM REV CODE 636 W HCPCS: Mod: NTX | Performed by: INTERNAL MEDICINE

## 2019-09-08 PROCEDURE — 99233 PR SUBSEQUENT HOSPITAL CARE,LEVL III: ICD-10-PCS | Mod: NTX,,, | Performed by: INTERNAL MEDICINE

## 2019-09-08 PROCEDURE — 83735 ASSAY OF MAGNESIUM: CPT | Mod: NTX

## 2019-09-08 PROCEDURE — 25000003 PHARM REV CODE 250: Mod: NTX | Performed by: PHYSICIAN ASSISTANT

## 2019-09-08 PROCEDURE — 82550 ASSAY OF CK (CPK): CPT | Mod: NTX

## 2019-09-08 PROCEDURE — 84100 ASSAY OF PHOSPHORUS: CPT | Mod: NTX

## 2019-09-08 PROCEDURE — 63600175 PHARM REV CODE 636 W HCPCS: Mod: NTX | Performed by: PHYSICIAN ASSISTANT

## 2019-09-08 RX ORDER — ATORVASTATIN CALCIUM 20 MG/1
20 TABLET, FILM COATED ORAL NIGHTLY
Status: DISCONTINUED | OUTPATIENT
Start: 2019-09-09 | End: 2019-09-09

## 2019-09-08 RX ORDER — ATORVASTATIN CALCIUM 20 MG/1
40 TABLET, FILM COATED ORAL NIGHTLY
Status: DISCONTINUED | OUTPATIENT
Start: 2019-09-09 | End: 2019-09-08

## 2019-09-08 RX ORDER — SENNOSIDES 8.6 MG/1
8.6 TABLET ORAL DAILY PRN
Status: DISCONTINUED | OUTPATIENT
Start: 2019-09-08 | End: 2019-09-10

## 2019-09-08 RX ADMIN — ISOSORBIDE DINITRATE 20 MG: 20 TABLET ORAL at 06:09

## 2019-09-08 RX ADMIN — POTASSIUM CHLORIDE 40 MEQ: 20 TABLET, EXTENDED RELEASE ORAL at 08:09

## 2019-09-08 RX ADMIN — SPIRONOLACTONE 25 MG: 25 TABLET, FILM COATED ORAL at 08:09

## 2019-09-08 RX ADMIN — GABAPENTIN 400 MG: 400 CAPSULE ORAL at 08:09

## 2019-09-08 RX ADMIN — ISOSORBIDE DINITRATE 20 MG: 20 TABLET ORAL at 02:09

## 2019-09-08 RX ADMIN — HEPARIN SODIUM 5000 UNITS: 5000 INJECTION, SOLUTION INTRAVENOUS; SUBCUTANEOUS at 08:09

## 2019-09-08 RX ADMIN — ISOSORBIDE DINITRATE 20 MG: 20 TABLET ORAL at 08:09

## 2019-09-08 RX ADMIN — DOBUTAMINE IN DEXTROSE 5 MCG/KG/MIN: 200 INJECTION, SOLUTION INTRAVENOUS at 08:09

## 2019-09-08 RX ADMIN — GUAIFENESIN 600 MG: 600 TABLET, EXTENDED RELEASE ORAL at 08:09

## 2019-09-08 RX ADMIN — HYDRALAZINE HYDROCHLORIDE 50 MG: 50 TABLET ORAL at 02:09

## 2019-09-08 RX ADMIN — AMIODARONE HYDROCHLORIDE 200 MG: 200 TABLET ORAL at 08:09

## 2019-09-08 RX ADMIN — VENLAFAXINE 150 MG: 37.5 TABLET ORAL at 08:09

## 2019-09-08 RX ADMIN — HYDRALAZINE HYDROCHLORIDE 50 MG: 50 TABLET ORAL at 06:09

## 2019-09-08 RX ADMIN — HEPARIN SODIUM 5000 UNITS: 5000 INJECTION, SOLUTION INTRAVENOUS; SUBCUTANEOUS at 06:09

## 2019-09-08 RX ADMIN — ATORVASTATIN CALCIUM 40 MG: 20 TABLET, FILM COATED ORAL at 08:09

## 2019-09-08 RX ADMIN — ASPIRIN 81 MG: 81 TABLET, COATED ORAL at 08:09

## 2019-09-08 RX ADMIN — ZOLPIDEM TARTRATE 10 MG: 5 TABLET ORAL at 10:09

## 2019-09-08 RX ADMIN — HEPARIN SODIUM 5000 UNITS: 5000 INJECTION, SOLUTION INTRAVENOUS; SUBCUTANEOUS at 02:09

## 2019-09-08 RX ADMIN — SENNOSIDES 8.6 MG: 8.6 TABLET, FILM COATED ORAL at 09:09

## 2019-09-08 RX ADMIN — GABAPENTIN 400 MG: 400 CAPSULE ORAL at 02:09

## 2019-09-08 RX ADMIN — DOCUSATE SODIUM 100 MG: 100 CAPSULE, LIQUID FILLED ORAL at 08:09

## 2019-09-08 RX ADMIN — DOBUTAMINE IN DEXTROSE 5 MCG/KG/MIN: 200 INJECTION, SOLUTION INTRAVENOUS at 02:09

## 2019-09-08 RX ADMIN — HYDRALAZINE HYDROCHLORIDE 50 MG: 50 TABLET ORAL at 08:09

## 2019-09-08 NOTE — ASSESSMENT & PLAN NOTE
Ultrasound done and showed enlarged multinodular thyroid which warrants surveillance in one year.   - TSH and fT4 wnl

## 2019-09-08 NOTE — ASSESSMENT & PLAN NOTE
NICM EF: 20%, LVEDD: 6.8cm. Repeat echo ordered and pending  RHC: done on admit 9/4/19 RA: 20/ 20/ 18 RV: 60/ 8/ 18 PA: 60/ 32/ 45 PWP: 43/ 70/ 40 . Cardiac output was 2.27 by Fitz. Cardiac index is 1.04 L/min/m2. O2 Sat: PA 34%. AO 95% PVR 2.2 PALMER  - Continue hydralazine 50 mg TID  - Continue isosorbide dinitrate 20 mg TID  - Continue  5 mcg/kg/min  - Stop lasix 5 mg/hr gtt. CVP 4 today  - Patient is currently being evaluated for OHTx/VAD  - Heart failure pathway Step 2  - 2g Na dietary restriction, 1500 mL fluid restriction, strict I/Os

## 2019-09-08 NOTE — ASSESSMENT & PLAN NOTE
Baseline cr around 1.3-1.4 in early 2019  - Cr still 1.7 today with CVP 4. Will hold diuretics today and reassess  - Renally dose meds from CrCl 45-30  - Avoid nephrotoxic agents

## 2019-09-08 NOTE — SUBJECTIVE & OBJECTIVE
Interval History: no acute events overnight. Patient reports jittery sensation improved, but with diffuse muscle cramps. Hydralazine increased to 50 TID yesterday.    Continuous Infusions:   DOBUTamine 5 mcg/kg/min (09/08/19 0217)     Scheduled Meds:   amiodarone  200 mg Oral BID    aspirin  81 mg Oral Daily    [START ON 9/9/2019] atorvastatin  20 mg Oral QHS    docusate sodium  100 mg Oral Daily    gabapentin  400 mg Oral TID    guaiFENesin  600 mg Oral BID    heparin (porcine)  5,000 Units Subcutaneous Q8H    hydrALAZINE  50 mg Oral Q8H    isosorbide dinitrate  20 mg Oral Q8H    potassium chloride  40 mEq Oral Daily    spironolactone  25 mg Oral Daily    venlafaxine  150 mg Oral Daily     PRN Meds:acetaminophen, diphenhydrAMINE, oxyCODONE-acetaminophen, promethazine, senna, sodium chloride 0.9%, zolpidem    Review of patient's allergies indicates:   Allergen Reactions    Codeine Itching     Objective:     Vital Signs (Most Recent):  Temp: 98.9 °F (37.2 °C) (09/08/19 1134)  Pulse: 88 (09/08/19 1134)  Resp: 16 (09/08/19 1134)  BP: (!) 105/59 (09/08/19 1134)  SpO2: 97 % (09/08/19 1134) Vital Signs (24h Range):  Temp:  [97.8 °F (36.6 °C)-98.9 °F (37.2 °C)] 98.9 °F (37.2 °C)  Pulse:  [84-96] 88  Resp:  [16] 16  SpO2:  [90 %-98 %] 97 %  BP: ()/(54-71) 105/59     Patient Vitals for the past 72 hrs (Last 3 readings):   Weight   09/08/19 0747 103.7 kg (228 lb 9.9 oz)   09/07/19 0706 103.4 kg (227 lb 15.3 oz)   09/06/19 0746 103.7 kg (228 lb 9.9 oz)     Body mass index is 35.05 kg/m².      Intake/Output Summary (Last 24 hours) at 9/8/2019 1203  Last data filed at 9/8/2019 0500  Gross per 24 hour   Intake 389.45 ml   Output 2500 ml   Net -2110.55 ml       Hemodynamic Parameters:  CVP:  [4 mmHg] 4 mmHg    Physical Exam   Constitutional: She appears well-developed and well-nourished.   HENT:   Head: Normocephalic and atraumatic.   Eyes: Conjunctivae are normal.   Neck: No JVD present.   Unable to see JVP  even with RUQ compression   Cardiovascular: Normal rate and regular rhythm.   CVC RIJ   Pulmonary/Chest: Effort normal and breath sounds normal.   Abdominal: Soft. She exhibits no distension.   Musculoskeletal: Normal range of motion.   Neurological: She is alert.   Skin: Skin is warm and dry. Capillary refill takes less than 2 seconds.   Nursing note and vitals reviewed.      Significant Labs:  CBC:  Recent Labs   Lab 09/06/19  0426 09/07/19  0517 09/08/19  0511   WBC 6.22 7.03 7.19   RBC 4.40 4.19 4.21   HGB 12.9 12.2 12.1   HCT 39.9 37.8 37.3    267 279   MCV 91 90 89   MCH 29.3 29.1 28.7   MCHC 32.3 32.3 32.4     BNP:  Recent Labs   Lab 09/06/19  1834   *     CMP:  Recent Labs   Lab 09/06/19  0426 09/06/19  1833 09/07/19  0517 09/08/19  0511   * 146* 125* 119*   CALCIUM 9.4 9.9 9.2 9.6   ALBUMIN 3.9  --  3.8 3.9   PROT 7.5  --  7.1 7.3    138 139 135*   K 3.9 4.1 3.5 3.7   CO2 27 22* 27 27    101 100 98   BUN 30* 30* 26* 23*   CREATININE 1.7* 2.1* 1.8* 1.7*   ALKPHOS 76  --  78 86   ALT 17  --  14 15   AST 17  --  15 19   BILITOT 0.6  --  0.7 0.7      Coagulation:   Recent Labs   Lab 09/04/19  0847 09/07/19  0517   INR 1.1 1.0   APTT  --  25.3     LDH:  Recent Labs   Lab 09/06/19  1833        Microbiology:  Microbiology Results (last 7 days)     ** No results found for the last 168 hours. **          I have reviewed all pertinent labs within the past 24 hours.    Estimated Creatinine Clearance: 50.2 mL/min (A) (based on SCr of 1.7 mg/dL (H)).    Diagnostic Results:  I have reviewed and interpreted all pertinent imaging results/findings within the past 24 hours.

## 2019-09-08 NOTE — PLAN OF CARE
Problem: Adult Inpatient Plan of Care  Goal: Plan of Care Review  Patient remains free from falls and injuries through out shift. Patient AAO and VSS. Patient denies chest pain and SOB.   infusing as ordered. Lasix gtt discontinued. K-3.7 at this time. PO replacement given. PRN laxative given, LBM 9/4.  LVAD workup continued. Plan of care reviewed with patient. Patient verbalizes understanding of plan.  Will continue to monitor.

## 2019-09-08 NOTE — PROGRESS NOTES
[facility]  Heart Transplant  Progress Note    Patient Name: Deborah Navas  MRN: [id number]  Admission Date: 9/4/2019  Hospital Length of Stay: 4 days  Attending Physician: Vi Bryant MD  Primary Care Provider: Primary Doctor No  Principal Problem:Acute on chronic combined systolic and diastolic heart failure    Subjective:     Interval History: no acute events overnight. Patient reports jittery sensation improved, but with diffuse muscle cramps. Hydralazine increased to 50 TID yesterday.    Continuous Infusions:   DOBUTamine 5 mcg/kg/min (09/08/19 0217)     Scheduled Meds:   amiodarone  200 mg Oral BID    aspirin  81 mg Oral Daily    [START ON 9/9/2019] atorvastatin  20 mg Oral QHS    docusate sodium  100 mg Oral Daily    gabapentin  400 mg Oral TID    guaiFENesin  600 mg Oral BID    heparin (porcine)  5,000 Units Subcutaneous Q8H    hydrALAZINE  50 mg Oral Q8H    isosorbide dinitrate  20 mg Oral Q8H    potassium chloride  40 mEq Oral Daily    spironolactone  25 mg Oral Daily    venlafaxine  150 mg Oral Daily     PRN Meds:acetaminophen, diphenhydrAMINE, oxyCODONE-acetaminophen, promethazine, senna, sodium chloride 0.9%, zolpidem    Review of patient's allergies indicates:   Allergen Reactions    Codeine Itching     Objective:     Vital Signs (Most Recent):  Temp: 98.9 °F (37.2 °C) (09/08/19 1134)  Pulse: 88 (09/08/19 1134)  Resp: 16 (09/08/19 1134)  BP: (!) 105/59 (09/08/19 1134)  SpO2: 97 % (09/08/19 1134) Vital Signs (24h Range):  Temp:  [97.8 °F (36.6 °C)-98.9 °F (37.2 °C)] 98.9 °F (37.2 °C)  Pulse:  [84-96] 88  Resp:  [16] 16  SpO2:  [90 %-98 %] 97 %  BP: ()/(54-71) 105/59     Patient Vitals for the past 72 hrs (Last 3 readings):   Weight   09/08/19 0747 103.7 kg (228 lb 9.9 oz)   09/07/19 0706 103.4 kg (227 lb 15.3 oz)   09/06/19 0746 103.7 kg (228 lb 9.9 oz)     Body mass index is 35.05 kg/m².      Intake/Output Summary (Last 24 hours) at 9/8/2019  1203  Last data filed at 9/8/2019 0500  Gross per 24 hour   Intake 389.45 ml   Output 2500 ml   Net -2110.55 ml       Hemodynamic Parameters:  CVP:  [4 mmHg] 4 mmHg    Physical Exam   Constitutional: She appears well-developed and well-nourished.   HENT:   Head: Normocephalic and atraumatic.   Eyes: Conjunctivae are normal.   Neck: No JVD present.   Unable to see JVP even with RUQ compression   Cardiovascular: Normal rate and regular rhythm.   CVC RIJ   Pulmonary/Chest: Effort normal and breath sounds normal.   Abdominal: Soft. She exhibits no distension.   Musculoskeletal: Normal range of motion.   Neurological: She is alert.   Skin: Skin is warm and dry. Capillary refill takes less than 2 seconds.   Nursing note and vitals reviewed.      Significant Labs:  CBC:  Recent Labs   Lab 09/06/19 0426 09/07/19  0517 09/08/19  0511   WBC 6.22 7.03 7.19   RBC 4.40 4.19 4.21   HGB 12.9 12.2 12.1   HCT 39.9 37.8 37.3    267 279   MCV 91 90 89   MCH 29.3 29.1 28.7   MCHC 32.3 32.3 32.4     BNP:  Recent Labs   Lab 09/06/19  1834   *     CMP:  Recent Labs   Lab 09/06/19 0426 09/06/19  1833 09/07/19  0517 09/08/19  0511   * 146* 125* 119*   CALCIUM 9.4 9.9 9.2 9.6   ALBUMIN 3.9  --  3.8 3.9   PROT 7.5  --  7.1 7.3    138 139 135*   K 3.9 4.1 3.5 3.7   CO2 27 22* 27 27    101 100 98   BUN 30* 30* 26* 23*   CREATININE 1.7* 2.1* 1.8* 1.7*   ALKPHOS 76  --  78 86   ALT 17  --  14 15   AST 17  --  15 19   BILITOT 0.6  --  0.7 0.7      Coagulation:   Recent Labs   Lab 09/04/19  0847 09/07/19  0517   INR 1.1 1.0   APTT  --  25.3     LDH:  Recent Labs   Lab 09/06/19  1833        Microbiology:  Microbiology Results (last 7 days)     ** No results found for the last 168 hours. **          I have reviewed all pertinent labs within the past 24 hours.    Estimated Creatinine Clearance: 50.2 mL/min (A) (based on SCr of 1.7 mg/dL (H)).    Diagnostic Results:  I have reviewed and interpreted all  pertinent imaging results/findings within the past 24 hours.    Assessment and Plan:     No notes on file    * Acute on chronic combined systolic and diastolic heart failure  NICM EF: 20%, LVEDD: 6.8cm. Repeat echo ordered and pending  RHC: done on admit 9/4/19 RA: 20/ 20/ 18 RV: 60/ 8/ 18 PA: 60/ 32/ 45 PWP: 43/ 70/ 40 . Cardiac output was 2.27 by Fitz. Cardiac index is 1.04 L/min/m2. O2 Sat: PA 34%. AO 95% PVR 2.2 PALMER  - Continue hydralazine 50 mg TID  - Continue isosorbide dinitrate 20 mg TID  - Continue  5 mcg/kg/min  - Stop lasix 5 mg/hr gtt. CVP 4 today  - Patient is currently being evaluated for OHTx/VAD  - Heart failure pathway Step 2  - 2g Na dietary restriction, 1500 mL fluid restriction, strict I/Os    CKD (chronic kidney disease)  Baseline cr around 1.3-1.4 in early 2019  - Cr still 1.7 today with CVP 4. Will hold diuretics today and reassess  - Renally dose meds from CrCl 45-30  - Avoid nephrotoxic agents    Abnormal CT scan  -focal opacity found at right base.  DDx: atelectasis.  Recommending repeat CT in 3 months    Enlarged thyroid  Ultrasound done and showed enlarged multinodular thyroid which warrants surveillance in one year.   - TSH and fT4 wnl    Ventricular tachyarrhythmia  - Amiodarone 200 mg PO BID  - Goal K >4 and Mg >2  - Monitor on telemetry    Jatinder Alves MD  Heart Transplant  [facility]

## 2019-09-08 NOTE — PLAN OF CARE
Problem: Adult Inpatient Plan of Care  Goal: Plan of Care Review  Outcome: Ongoing (interventions implemented as appropriate)  Pt free of falls and injury during shift. POC reviewed with pt. VS stable. Paced on telemetry. Lasix and dobutamine infusing. Fluids infusing into cordis. RIJ dressing reinforced. No acute events noted at this time. No complaints. Educated pt why they are at risk for falls and to use call light for assistance ambulating. Yellow non-slip socks on pt. Bed low and locked, call light with in reach. Will continue to monitor.

## 2019-09-09 ENCOUNTER — ANESTHESIA EVENT (OUTPATIENT)
Dept: SURGERY | Facility: HOSPITAL | Age: 49
DRG: 001 | End: 2019-09-09
Payer: COMMERCIAL

## 2019-09-09 ENCOUNTER — COMMITTEE REVIEW (OUTPATIENT)
Dept: TRANSPLANT | Facility: CLINIC | Age: 49
End: 2019-09-09

## 2019-09-09 ENCOUNTER — CONFERENCE (OUTPATIENT)
Dept: TRANSPLANT | Facility: CLINIC | Age: 49
End: 2019-09-09

## 2019-09-09 ENCOUNTER — TELEPHONE (OUTPATIENT)
Dept: TRANSPLANT | Facility: CLINIC | Age: 49
End: 2019-09-09

## 2019-09-09 ENCOUNTER — CLINICAL SUPPORT (OUTPATIENT)
Dept: CARDIOLOGY | Facility: CLINIC | Age: 49
DRG: 001 | End: 2019-09-09
Attending: INTERNAL MEDICINE
Payer: COMMERCIAL

## 2019-09-09 LAB
ABO + RH BLD: NORMAL
ALBUMIN SERPL BCP-MCNC: 3.5 G/DL (ref 3.5–5.2)
ALP SERPL-CCNC: 74 U/L (ref 55–135)
ALT SERPL W/O P-5'-P-CCNC: 17 U/L (ref 10–44)
AMPHETAMINES SERPL QL: NEGATIVE
ANION GAP SERPL CALC-SCNC: 13 MMOL/L (ref 8–16)
AST SERPL-CCNC: 22 U/L (ref 10–40)
AT III AG ACT/NOR PPP IA: 107 % (ref 80–120)
BARBITURATES SERPL QL SCN: NEGATIVE
BASOPHILS # BLD AUTO: 0.04 K/UL (ref 0–0.2)
BASOPHILS NFR BLD: 0.6 % (ref 0–1.9)
BENZODIAZ SERPL QL SCN: NEGATIVE
BILIRUB SERPL-MCNC: 0.6 MG/DL (ref 0.1–1)
BLD GP AB SCN CELLS X3 SERPL QL: NORMAL
BUN SERPL-MCNC: 24 MG/DL (ref 6–20)
BZE SERPL QL: NEGATIVE
CALCIUM SERPL-MCNC: 9.4 MG/DL (ref 8.7–10.5)
CARBOXYTHC SERPL QL SCN: NEGATIVE
CHLORIDE SERPL-SCNC: 102 MMOL/L (ref 95–110)
CO2 SERPL-SCNC: 24 MMOL/L (ref 23–29)
CREAT SERPL-MCNC: 1.5 MG/DL (ref 0.5–1.4)
DIFFERENTIAL METHOD: ABNORMAL
DLCO ADJ PRE: 21.47 ML/(MIN*MMHG) (ref 20.44–31.91)
DLCO SINGLE BREATH LLN: 20.44
DLCO SINGLE BREATH PRE REF: 76.5 %
DLCO SINGLE BREATH REF: 26.17
DLCOC SBVA LLN: 3.45
DLCOC SBVA PRE REF: 87.7 %
DLCOC SBVA REF: 4.82
DLCOC SINGLE BREATH LLN: 20.44
DLCOC SINGLE BREATH PRE REF: 82 %
DLCOC SINGLE BREATH REF: 26.17
DLCOCSBVAULN: 6.19
DLCOCSINGLEBREATHULN: 31.91
DLCOSINGLEBREATHULN: 31.91
DLCOVA LLN: 3.45
DLCOVA PRE REF: 81.8 %
DLCOVA PRE: 3.94 ML/(MIN*MMHG*L) (ref 3.45–6.19)
DLCOVA REF: 4.82
DLCOVAULN: 6.19
DLVAADJ PRE: 4.23 ML/(MIN*MMHG*L) (ref 3.45–6.19)
EOSINOPHIL # BLD AUTO: 0.4 K/UL (ref 0–0.5)
EOSINOPHIL NFR BLD: 6.3 % (ref 0–8)
ERVN2 LLN: 1
ERVN2 PRE REF: 56.2 %
ERVN2 PRE: 0.56 L (ref 1–1)
ERVN2 REF: 1
ERVN2ULN: 1
ERYTHROCYTE [DISTWIDTH] IN BLOOD BY AUTOMATED COUNT: 14.4 % (ref 11.5–14.5)
EST. GFR  (AFRICAN AMERICAN): 46.8 ML/MIN/1.73 M^2
EST. GFR  (NON AFRICAN AMERICAN): 40.6 ML/MIN/1.73 M^2
ETHANOL SERPL QL SCN: NEGATIVE
F2 GENE MUT ANL BLD/T: NORMAL
FACT VIII ACT/NOR PPP: 228 % (ref 60–170)
FEF 25 75 LLN: 1.64
FEF 25 75 PRE REF: 89.1 %
FEF 25 75 REF: 2.91
FEV05 LLN: 1.28
FEV05 REF: 2.14
FEV1 FVC LLN: 69
FEV1 FVC PRE REF: 99.6 %
FEV1 FVC REF: 80
FEV1 LLN: 2.38
FEV1 PRE REF: 88 %
FEV1 REF: 3.04
FRCN2 LLN: 2.03
FRCN2 PRE REF: 86.1 %
FRCN2 REF: 2.86
FRCN2ULN: 3.68
FVC LLN: 2.99
FVC PRE REF: 87.7 %
FVC REF: 3.82
G6PD RBC-CCNT: 13.3 U/G HGB (ref 7–20.5)
GLUCOSE SERPL-MCNC: 114 MG/DL (ref 70–110)
HBV CORE AB SERPL QL IA: NEGATIVE
HBV SURFACE AB SER-ACNC: POSITIVE M[IU]/ML
HBV SURFACE AG SERPL QL IA: NEGATIVE
HCT VFR BLD AUTO: 36.8 % (ref 37–48.5)
HCV AB SERPL QL IA: NEGATIVE
HEPATITIS A ANTIBODY, IGG: NEGATIVE
HGB BLD-MCNC: 11.4 G/DL (ref 12–16)
HIV 1+2 AB+HIV1 P24 AG SERPL QL IA: NEGATIVE
IMM GRANULOCYTES # BLD AUTO: 0.03 K/UL (ref 0–0.04)
IMM GRANULOCYTES NFR BLD AUTO: 0.4 % (ref 0–0.5)
IVC PRE: 3.37 L (ref 2.99–4.66)
IVC SINGLE BREATH LLN: 2.99
IVC SINGLE BREATH PRE REF: 88 %
IVC SINGLE BREATH REF: 3.82
IVCSINGLEBREATHULN: 4.66
LEFT ABI: 1.09
LEFT ARM BP: 113 MMHG
LEFT DORSALIS PEDIS: 130 MMHG
LEFT POSTERIOR TIBIAL: 125 MMHG
LYMPHOCYTES # BLD AUTO: 1.4 K/UL (ref 1–4.8)
LYMPHOCYTES NFR BLD: 19.7 % (ref 18–48)
MAGNESIUM SERPL-MCNC: 2.3 MG/DL (ref 1.6–2.6)
MCH RBC QN AUTO: 28.7 PG (ref 27–31)
MCHC RBC AUTO-ENTMCNC: 31 G/DL (ref 32–36)
MCV RBC AUTO: 93 FL (ref 82–98)
METHADONE SERPL QL SCN: NEGATIVE
MONOCYTES # BLD AUTO: 0.7 K/UL (ref 0.3–1)
MONOCYTES NFR BLD: 9.4 % (ref 4–15)
MVV LLN: 98
MVV PRE REF: 74.4 %
MVV REF: 116
NEUTROPHILS # BLD AUTO: 4.4 K/UL (ref 1.8–7.7)
NEUTROPHILS NFR BLD: 63.6 % (ref 38–73)
NRBC BLD-RTO: 0 /100 WBC
OPIATES SERPL QL SCN: NEGATIVE
PCP SERPL QL SCN: NEGATIVE
PEF LLN: 5.36
PEF PRE REF: 97.3 %
PEF REF: 7.23
PHOSPHATE SERPL-MCNC: 4.1 MG/DL (ref 2.7–4.5)
PLATELET # BLD AUTO: 256 K/UL (ref 150–350)
PMV BLD AUTO: 9.7 FL (ref 9.2–12.9)
POTASSIUM SERPL-SCNC: 3.8 MMOL/L (ref 3.5–5.1)
PRE DLCO: 20.03 ML/(MIN*MMHG) (ref 20.44–31.91)
PRE FEF 25 75: 2.6 L/S (ref 1.64–4.19)
PRE FET 100: 7.29 SEC
PRE FEV05 REF: 97.9 %
PRE FEV1 FVC: 79.87 % (ref 69.08–91.25)
PRE FEV1: 2.68 L (ref 2.38–3.71)
PRE FEV5: 2.09 L (ref 1.28–2.99)
PRE FRC N2: 2.46 L
PRE FVC: 3.35 L (ref 2.99–4.66)
PRE MVV: 86 L/MIN (ref 98.26–132.94)
PRE PEF: 7.03 L/S (ref 5.36–9.1)
PROPOXYPH SERPL QL: NEGATIVE
PROT SERPL-MCNC: 6.7 G/DL (ref 6–8.4)
RBC # BLD AUTO: 3.97 M/UL (ref 4–5.4)
RIGHT ABI: 1.1
RIGHT ARM BP: 119 MMHG
RIGHT DORSALIS PEDIS: 120 MMHG
RIGHT POSTERIOR TIBIAL: 131 MMHG
RVN2 LLN: 1.29
RVN2 PRE REF: 102.1 %
RVN2 PRE: 1.9 L (ref 1.29–2.44)
RVN2 REF: 1.86
RVN2TLCN2 LLN: 26.03
RVN2TLCN2 PRE REF: 100.6 %
RVN2TLCN2 PRE: 35.85 % (ref 26.03–45.21)
RVN2TLCN2 REF: 35.62
RVN2TLCN2ULN: 45.21
RVN2ULN: 2.44
SODIUM SERPL-SCNC: 139 MMOL/L (ref 136–145)
TLCN2 LLN: 4.44
TLCN2 PRE REF: 97.6 %
TLCN2 PRE: 5.3 L (ref 4.44–6.42)
TLCN2 REF: 5.43
TLCN2ULN: 6.42
VA PRE: 5.08 L (ref 5.28–5.28)
VA SINGLE BREATH LLN: 5.28
VA SINGLE BREATH PRE REF: 96.3 %
VA SINGLE BREATH REF: 5.28
VASINGLEBREATHULN: 5.28
VCMAXN2 LLN: 2.99
VCMAXN2 PRE REF: 88.9 %
VCMAXN2 PRE: 3.4 L (ref 2.99–4.66)
VCMAXN2 REF: 3.82
VCMAXN2ULN: 4.66
VWF AG ACT/NOR PPP IA: 234 % (ref 55–200)
VWF:AC ACT/NOR PPP IA: 170 % (ref 55–200)
WBC # BLD AUTO: 6.94 K/UL (ref 3.9–12.7)

## 2019-09-09 PROCEDURE — 99233 SBSQ HOSP IP/OBS HIGH 50: CPT | Mod: NTX,,, | Performed by: INTERNAL MEDICINE

## 2019-09-09 PROCEDURE — 25000003 PHARM REV CODE 250: Mod: NTX | Performed by: STUDENT IN AN ORGANIZED HEALTH CARE EDUCATION/TRAINING PROGRAM

## 2019-09-09 PROCEDURE — 88175 CYTOPATH C/V AUTO FLUID REDO: CPT | Mod: NTX

## 2019-09-09 PROCEDURE — 86480 TB TEST CELL IMMUN MEASURE: CPT | Mod: NTX

## 2019-09-09 PROCEDURE — 99223 1ST HOSP IP/OBS HIGH 75: CPT | Mod: NTX,,, | Performed by: CLINICAL NURSE SPECIALIST

## 2019-09-09 PROCEDURE — 84100 ASSAY OF PHOSPHORUS: CPT | Mod: NTX

## 2019-09-09 PROCEDURE — 25000003 PHARM REV CODE 250: Mod: NTX | Performed by: INTERNAL MEDICINE

## 2019-09-09 PROCEDURE — 99233 PR SUBSEQUENT HOSPITAL CARE,LEVL III: ICD-10-PCS | Mod: NTX,,, | Performed by: INTERNAL MEDICINE

## 2019-09-09 PROCEDURE — 25000003 PHARM REV CODE 250: Mod: NTX | Performed by: PHYSICIAN ASSISTANT

## 2019-09-09 PROCEDURE — 86850 RBC ANTIBODY SCREEN: CPT | Mod: NTX

## 2019-09-09 PROCEDURE — 80053 COMPREHEN METABOLIC PANEL: CPT | Mod: NTX

## 2019-09-09 PROCEDURE — 99223 PR INITIAL HOSPITAL CARE,LEVL III: ICD-10-PCS | Mod: NTX,,, | Performed by: CLINICAL NURSE SPECIALIST

## 2019-09-09 PROCEDURE — 63600175 PHARM REV CODE 636 W HCPCS: Mod: NTX | Performed by: NURSE PRACTITIONER

## 2019-09-09 PROCEDURE — 93922 UPR/L XTREMITY ART 2 LEVELS: CPT | Mod: 50,NTX

## 2019-09-09 PROCEDURE — 86920 COMPATIBILITY TEST SPIN: CPT | Mod: NTX

## 2019-09-09 PROCEDURE — 85025 COMPLETE CBC W/AUTO DIFF WBC: CPT | Mod: NTX

## 2019-09-09 PROCEDURE — 63600175 PHARM REV CODE 636 W HCPCS: Mod: NTX | Performed by: INTERNAL MEDICINE

## 2019-09-09 PROCEDURE — 94010 BREATHING CAPACITY TEST: CPT | Mod: NTX | Performed by: INTERNAL MEDICINE

## 2019-09-09 PROCEDURE — 83735 ASSAY OF MAGNESIUM: CPT | Mod: NTX

## 2019-09-09 PROCEDURE — 93922 CV US ANKLE BRACHIAL INDICES RESTING (CUPID ONLY): ICD-10-PCS | Mod: 26,NTX,, | Performed by: INTERNAL MEDICINE

## 2019-09-09 PROCEDURE — 63600175 PHARM REV CODE 636 W HCPCS: Mod: NTX | Performed by: PHYSICIAN ASSISTANT

## 2019-09-09 PROCEDURE — 20600001 HC STEP DOWN PRIVATE ROOM: Mod: NTX

## 2019-09-09 PROCEDURE — 94729 DIFFUSING CAPACITY: CPT | Mod: NTX | Performed by: INTERNAL MEDICINE

## 2019-09-09 PROCEDURE — 94727 GAS DIL/WSHOT DETER LNG VOL: CPT | Mod: NTX | Performed by: INTERNAL MEDICINE

## 2019-09-09 PROCEDURE — 93922 UPR/L XTREMITY ART 2 LEVELS: CPT | Mod: 26,NTX,, | Performed by: INTERNAL MEDICINE

## 2019-09-09 RX ORDER — SYRING-NEEDL,DISP,INSUL,0.3 ML 29 G X1/2"
296 SYRINGE, EMPTY DISPOSABLE MISCELLANEOUS ONCE
Status: DISCONTINUED | OUTPATIENT
Start: 2019-09-09 | End: 2019-09-10

## 2019-09-09 RX ORDER — FUROSEMIDE 20 MG/1
20 TABLET ORAL DAILY
Status: DISCONTINUED | OUTPATIENT
Start: 2019-09-09 | End: 2019-09-09

## 2019-09-09 RX ORDER — FUROSEMIDE 10 MG/ML
40 INJECTION INTRAMUSCULAR; INTRAVENOUS 2 TIMES DAILY
Status: DISCONTINUED | OUTPATIENT
Start: 2019-09-09 | End: 2019-09-10

## 2019-09-09 RX ORDER — POTASSIUM CHLORIDE 20 MEQ/1
20 TABLET, EXTENDED RELEASE ORAL ONCE
Status: COMPLETED | OUTPATIENT
Start: 2019-09-09 | End: 2019-09-09

## 2019-09-09 RX ORDER — FUROSEMIDE 40 MG/1
40 TABLET ORAL 2 TIMES DAILY
Status: DISCONTINUED | OUTPATIENT
Start: 2019-09-10 | End: 2019-09-09

## 2019-09-09 RX ORDER — BISACODYL 10 MG
10 SUPPOSITORY, RECTAL RECTAL DAILY PRN
Status: DISCONTINUED | OUTPATIENT
Start: 2019-09-09 | End: 2019-09-10

## 2019-09-09 RX ADMIN — DOBUTAMINE IN DEXTROSE 5 MCG/KG/MIN: 200 INJECTION, SOLUTION INTRAVENOUS at 03:09

## 2019-09-09 RX ADMIN — FUROSEMIDE 40 MG: 10 INJECTION, SOLUTION INTRAMUSCULAR; INTRAVENOUS at 05:09

## 2019-09-09 RX ADMIN — Medication 10 MG: at 06:09

## 2019-09-09 RX ADMIN — AMIODARONE HYDROCHLORIDE 200 MG: 200 TABLET ORAL at 07:09

## 2019-09-09 RX ADMIN — GABAPENTIN 400 MG: 400 CAPSULE ORAL at 08:09

## 2019-09-09 RX ADMIN — AMIODARONE HYDROCHLORIDE 200 MG: 200 TABLET ORAL at 08:09

## 2019-09-09 RX ADMIN — POTASSIUM CHLORIDE 20 MEQ: 20 TABLET, EXTENDED RELEASE ORAL at 08:09

## 2019-09-09 RX ADMIN — GUAIFENESIN 600 MG: 600 TABLET, EXTENDED RELEASE ORAL at 07:09

## 2019-09-09 RX ADMIN — DOCUSATE SODIUM 100 MG: 100 CAPSULE, LIQUID FILLED ORAL at 08:09

## 2019-09-09 RX ADMIN — GABAPENTIN 400 MG: 400 CAPSULE ORAL at 07:09

## 2019-09-09 RX ADMIN — VENLAFAXINE 150 MG: 37.5 TABLET ORAL at 08:09

## 2019-09-09 RX ADMIN — HEPARIN SODIUM 5000 UNITS: 5000 INJECTION, SOLUTION INTRAVENOUS; SUBCUTANEOUS at 06:09

## 2019-09-09 RX ADMIN — GABAPENTIN 400 MG: 400 CAPSULE ORAL at 05:09

## 2019-09-09 RX ADMIN — ISOSORBIDE DINITRATE 20 MG: 20 TABLET ORAL at 06:09

## 2019-09-09 RX ADMIN — SPIRONOLACTONE 25 MG: 25 TABLET, FILM COATED ORAL at 08:09

## 2019-09-09 RX ADMIN — ASPIRIN 81 MG: 81 TABLET, COATED ORAL at 08:09

## 2019-09-09 RX ADMIN — HYDRALAZINE HYDROCHLORIDE 50 MG: 50 TABLET ORAL at 06:09

## 2019-09-09 RX ADMIN — GUAIFENESIN 600 MG: 600 TABLET, EXTENDED RELEASE ORAL at 08:09

## 2019-09-09 RX ADMIN — PHYTONADIONE 10 MG: 10 INJECTION, EMULSION INTRAMUSCULAR; INTRAVENOUS; SUBCUTANEOUS at 07:09

## 2019-09-09 NOTE — COMMITTEE REVIEW
Native Organ Dx: Idiopathic CM    Pt's case presented at an urgently convened Selection Committee 9/9/19. It was the committee decision to defer the pt for OHT listing due to elevated PA pressures and incomplete evaluation.  She can be represented once evaluation is complete and PA pressures are lower.  She is approved for LVAD as DT.     Note was written by Milady Vidal.    ==========================================================

## 2019-09-09 NOTE — PLAN OF CARE
Problem: Adult Inpatient Plan of Care  Goal: Plan of Care Review  Outcome: Ongoing (interventions implemented as appropriate)  Patient remains free from falls and injuries through out shift. Patient AAO and VSS. Patient denies chest pain and SOB.   infusing as ordered. LBM 9/4.  LVAD workup continued. Plan of care reviewed with patient. Patient verbalizes understanding of plan.  Will continue to monitor.

## 2019-09-09 NOTE — ASSESSMENT & PLAN NOTE
NICM EF: 20%, LVEDD: 6.8cm. Repeat echo ordered and pending  RHC: done on admit 9/4/19 RA: 20/ 20/ 18 RV: 60/ 8/ 18 PA: 60/ 32/ 45 PWP: 43/ 70/ 40 . Cardiac output was 2.27 by Fitz. Cardiac index is 1.04 L/min/m2. O2 Sat: PA 34%. AO 95% PVR 2.2 PALMER  - LVAD tomorrow  - Lasix 20 mg PO daily (half of home regimen)  - Continue hydralazine 50 mg TID  - Continue isosorbide dinitrate 20 mg TID  - Continue  5 mcg/kg/min  - Patient is currently being evaluated for OHTx/VAD  - Heart failure pathway Step 2  - 2g Na dietary restriction, 1500 mL fluid restriction, strict I/Os

## 2019-09-09 NOTE — ASSESSMENT & PLAN NOTE
Baseline cr around 1.3-1.4 in early 2019  - Cr at baseline today   - Renally dose meds from CrCl 45-30  - Avoid nephrotoxic agents

## 2019-09-09 NOTE — PROGRESS NOTES
[facility]  Heart Transplant  Progress Note    Patient Name: Deborah Navas  MRN: [id number]  Admission Date: 9/4/2019  Hospital Length of Stay: 5 days  Attending Physician: Vi Bryant MD  Primary Care Provider: Primary Doctor No  Principal Problem:Acute on chronic combined systolic and diastolic heart failure    Subjective:     Interval History: no acute events overnight. Lasix d/c'd yesterday. Feeling well this AM. Plan for LVAD tomorrow.    Continuous Infusions:   DOBUTamine 5 mcg/kg/min (09/08/19 2001)     Scheduled Meds:   amiodarone  200 mg Oral BID    aspirin  81 mg Oral Daily    docusate sodium  100 mg Oral Daily    furosemide  20 mg Oral Daily    gabapentin  400 mg Oral TID    guaiFENesin  600 mg Oral BID    heparin (porcine)  5,000 Units Subcutaneous Q8H    magnesium citrate  296 mL Oral Once    spironolactone  25 mg Oral Daily    venlafaxine  150 mg Oral Daily     PRN Meds:acetaminophen, diphenhydrAMINE, oxyCODONE-acetaminophen, promethazine, senna, sodium chloride 0.9%, zolpidem    Review of patient's allergies indicates:   Allergen Reactions    Codeine Itching     Objective:     Vital Signs (Most Recent):  Temp: 96.4 °F (35.8 °C) (09/09/19 1200)  Pulse: 92 (09/09/19 1200)  Resp: 17 (09/09/19 1200)  BP: 101/61 (09/09/19 1200)  SpO2: 97 % (09/09/19 1200) Vital Signs (24h Range):  Temp:  [96.4 °F (35.8 °C)-98.7 °F (37.1 °C)] 96.4 °F (35.8 °C)  Pulse:  [83-94] 92  Resp:  [16-18] 17  SpO2:  [94 %-98 %] 97 %  BP: ()/(51-67) 101/61     Patient Vitals for the past 72 hrs (Last 3 readings):   Weight   09/09/19 0740 104.6 kg (230 lb 9.6 oz)   09/08/19 0747 103.7 kg (228 lb 9.9 oz)   09/07/19 0706 103.4 kg (227 lb 15.3 oz)     Body mass index is 35.36 kg/m².      Intake/Output Summary (Last 24 hours) at 9/9/2019 1307  Last data filed at 9/9/2019 0900  Gross per 24 hour   Intake 730 ml   Output 1300 ml   Net -570 ml       Hemodynamic Parameters:  CVP:  [8 mmHg] 8  mmHg    Physical Exam   Constitutional: She appears well-developed and well-nourished.   HENT:   Head: Normocephalic and atraumatic.   Eyes: Conjunctivae are normal.   Neck: No JVD present.   Unable to see JVP even with RUQ compression   Cardiovascular: Normal rate and regular rhythm.   CVC RIJ   Pulmonary/Chest: Effort normal and breath sounds normal.   Abdominal: Soft. She exhibits no distension.   Musculoskeletal: Normal range of motion.   Neurological: She is alert.   Skin: Skin is warm and dry. Capillary refill takes less than 2 seconds.   Nursing note and vitals reviewed.      Significant Labs:  CBC:  Recent Labs   Lab 09/07/19 0517 09/08/19  0511 09/09/19  0459   WBC 7.03 7.19 6.94   RBC 4.19 4.21 3.97*   HGB 12.2 12.1 11.4*   HCT 37.8 37.3 36.8*    279 256   MCV 90 89 93   MCH 29.1 28.7 28.7   MCHC 32.3 32.4 31.0*     BNP:  Recent Labs   Lab 09/06/19  1834   *     CMP:  Recent Labs   Lab 09/07/19 0517 09/08/19  0511 09/09/19  0459   * 119* 114*   CALCIUM 9.2 9.6 9.4   ALBUMIN 3.8 3.9 3.5   PROT 7.1 7.3 6.7    135* 139   K 3.5 3.7 3.8   CO2 27 27 24    98 102   BUN 26* 23* 24*   CREATININE 1.8* 1.7* 1.5*   ALKPHOS 78 86 74   ALT 14 15 17   AST 15 19 22   BILITOT 0.7 0.7 0.6      Coagulation:   Recent Labs   Lab 09/04/19  0847 09/07/19  0517   INR 1.1 1.0   APTT  --  25.3     LDH:  Recent Labs   Lab 09/06/19  1833        Microbiology:  Microbiology Results (last 7 days)     ** No results found for the last 168 hours. **          I have reviewed all pertinent labs within the past 24 hours.    Estimated Creatinine Clearance: 57.2 mL/min (A) (based on SCr of 1.5 mg/dL (H)).    Diagnostic Results:  I have reviewed and interpreted all pertinent imaging results/findings within the past 24 hours.    Assessment and Plan:     No notes on file    * Acute on chronic combined systolic and diastolic heart failure  NICM EF: 20%, LVEDD: 6.8cm. Repeat echo ordered and pending  RHC:  done on admit 9/4/19 RA: 20/ 20/ 18 RV: 60/ 8/ 18 PA: 60/ 32/ 45 PWP: 43/ 70/ 40 . Cardiac output was 2.27 by Fitz. Cardiac index is 1.04 L/min/m2. O2 Sat: PA 34%. AO 95% PVR 2.2 PALMER  - LVAD tomorrow  - Lasix 20 mg PO daily (half of home regimen)  - Continue hydralazine 50 mg TID  - Continue isosorbide dinitrate 20 mg TID  - Continue  5 mcg/kg/min  - Patient is currently being evaluated for OHTx/VAD  - Heart failure pathway Step 2  - 2g Na dietary restriction, 1500 mL fluid restriction, strict I/Os    CKD (chronic kidney disease)  Baseline cr around 1.3-1.4 in early 2019  - Cr at baseline today   - Renally dose meds from CrCl 45-30  - Avoid nephrotoxic agents    Abnormal CT scan  -focal opacity found at right base.  DDx: atelectasis.  Recommending repeat CT in 3 months    Enlarged thyroid  Ultrasound done and showed enlarged multinodular thyroid which warrants surveillance in one year.   - TSH and fT4 wnl    Ventricular tachyarrhythmia  - Amiodarone 200 mg PO BID  - Goal K >4 and Mg >2  - Monitor on telemetry      Jatinder Alves MD  Heart Transplant  [facility]

## 2019-09-09 NOTE — PROGRESS NOTES
Visited pt's room at [facility] for consult. Pt was not in room 2/2 imaging studies. Nurse instructed to page GYN service once pt returns to room. Will attempt to see pt later today if schedule allows.    ROSELIA Huang MD  OBGYN PGY1   [phone]

## 2019-09-09 NOTE — CONSULTS
Consult Note  Gynecology    Consult Requested By: Cardiology (primary)  Reason for Consult: gynecological exam pre-transplant    SUBJECTIVE:     History of Present Illness:  Patient is a 48 y.o. with chronic combined systolic and diastolic HF currently being evaluated for cardiac transplant. GYN services requested for pap smear and well woman exam.    Patient is postmenopausal, no regular GYN; has [facility] . She has no gynecologic complaints at this time. Denies vaginal bleeding, abnormal discharge, difficulty with urination, incontinence, breast masses, skin changes, or nipple discharge.     OB History:      GYN History:  Menopause at age 44, LMP , when not on hormonal contraception, prior cycles regular, occurred monthly, lasting 4-6 days with normal flow, and significant pain  Last pap   Denies h/o abnormal paps or excisional procedures  Not sexually active  Denies h/o STDs  Last MM  Denies h/o abnormal MMG    Family History:  No family history of breast, uterine, or ovarian cancer.    PMHx:   Past Medical History:   Diagnosis Date    Fractures     History of ventricular fibrillation 2019    History of ventricular tachycardia 2019    Hyperlipidemia     Migraine     Osteoarthritis        PSHx:   Past Surgical History:   Procedure Laterality Date    BACK SURGERY      2007    BONE GRAFT Left     from Left hip to Left FA    eardrum reconstruction  1980    ELBOW SURGERY Left [id number]    FOREARM FRACTURE SURGERY Bilateral [id number]    multiple surgeries    INSERTION OF IMPLANTABLE CARDIOVERTER-DEFIBRILLATOR (ICD) GENERATOR WITH TWO EXISTING LEADS      INSERTION OF PACEMAKER Left     INSERTION, CATHETER, RIGHT HEART Right 2019    Performed by [facility] at [facility] CATH [facility]    LUMBAR FUSION      L4-L5    SINUS SURGERY Right     with lymph nodes    TEMPOROMANDIBULAR JOINT SURGERY Right     TONSILLECTOMY      TYMPANOSTOMY TUBE  PLACEMENT  1971- 1979    multiple tube placements       All:   Review of patient's allergies indicates:   Allergen Reactions    Codeine Itching       Meds:   Medications Prior to Admission   Medication Sig Dispense Refill Last Dose    amiodarone (PACERONE) 200 MG Tab Take 200 mg by mouth 2 (two) times daily.    9/4/2019 at 0615    aspirin (ECOTRIN) 81 MG EC tablet Take 81 mg by mouth.   9/4/2019 at 0615    atorvastatin (LIPITOR) 40 MG tablet Take 40 mg by mouth once daily.  3 9/4/2019 at 0615    cpm-phenyleph-acetaminophen (NOREL AD) 4- mg Tab Take by mouth 2 (two) times daily.   9/4/2019 at 0615    docusate sodium (COLACE) 100 MG capsule Take 100 mg by mouth once daily.   9/4/2019 at 0615    furosemide (LASIX) 40 MG tablet Take 40 mg by mouth.   9/4/2019 at 0615    gabapentin (NEURONTIN) 800 MG tablet Take 400 mg by mouth 3 (three) times daily.    9/4/2019 at 0615    loratadine (CLARITIN) 10 mg tablet Take 10 mg by mouth daily as needed.   Past Week at Unknown time    oxycodone-acetaminophen (PERCOCET) 5-325 mg per tablet Take 1 tablet by mouth every 4 (four) hours as needed for Pain.   Past Month at Unknown time    potassium chloride (K-TAB) 20 mEq Take 2 tablets by mouth.   9/4/2019 at 0615    promethazine (PHENERGAN) 25 MG tablet Take 25 mg by mouth every 6 (six) hours as needed for Nausea.   Past Month at Unknown time    spironolactone (ALDACTONE) 25 MG tablet Take 25 mg by mouth.   9/4/2019 at 0615    VENLAFAXINE HCL (EFFEXOR ORAL) Take 150 mg by mouth once daily.   9/4/2019 at 0615    ZOLPIDEM TARTRATE (AMBIEN ORAL) Take 12.5 mg by mouth nightly as needed.   9/3/2019 at 2130    metOLazone (ZAROXOLYN) 5 MG tablet Take 2.5 mg by mouth daily as needed.   More than a month at Unknown time       SH:   None    FH:   Family History   Problem Relation Age of Onset    Osteoarthritis Mother     Migraines Mother     Osteoarthritis Maternal Grandmother     Migraines Maternal Grandmother      Broken bones Maternal Grandmother     Osteoporosis Maternal Grandmother     Dislocations Maternal Grandmother     Scoliosis Maternal Grandmother     Osteoarthritis Paternal Grandmother     Cancer Paternal Grandmother         leukemia    Migraines Paternal Grandmother     Obesity Paternal Grandmother     Osteoarthritis Paternal Grandfather     Heart failure Paternal Grandfather     Migraines Paternal Grandfather     Heart failure Maternal Grandfather     Migraines Maternal Grandfather     Osteoarthritis Maternal Grandfather     Stroke Father     Osteoarthritis Father        Review of Systems:  Constitutional: no fever or chills  Respiratory: no cough or shortness of breath  Cardiovascular: no chest pain or palpitations  Gastrointestinal: no nausea or vomiting, tolerating diet, negative for change in bowel habits  Genitourinary: no hematuria or dysuria, negative for urinary incontinence  Integument/Breast: no rash or pruritis, negative for breast lump, nipple discharge and skin lesion(s)  Hematologic/Lymphatic: no easy bruising or lymphadenopathy     OBJECTIVE:     Temp:  [96.4 °F (35.8 °C)-98.7 °F (37.1 °C)] 98.3 °F (36.8 °C)  Pulse:  [83-94] 88  Resp:  [16-18] 18  SpO2:  [94 %-98 %] 98 %  BP: ()/(51-67) 112/54    Physical Exam:  GEN: No apparent distress. Alert and oriented. Obese  NECK: No thyroid tenderness, no palpable masses or nodules.  CV: Regular rate  LUNGS: Normal effort  BREASTS: No tenderness or palpable masses. No skin changes or nipple discharge. No axillary lymphadenopathy.  ABDOMEN: Soft, non tender, non-distended.  EXT: No erythema, no edema  EXT. GENITALIA: appear normal, no lesions noted  VAGINA: Pink, moist, and well-rugated. No blood or discharge present, no lesions noted  CERVIX: Appears normal, no lesions noted, no cervical motion tenderness  UTERUS: size difficult to approximate 2/2 body habitus, normal contour, mobile, no fundal tenderness  ADNEXA: No palpable masses, no  adnexal tenderness      ASSESSMENT/PLAN:     Patient Active Problem List   Diagnosis    Knee pain    Pes anserine bursitis    Cardiac defibrillator in situ    Pulmonary hypertension due to left heart disease    Ventricular fibrillation    Ventricular tachyarrhythmia    Acute on chronic combined systolic and diastolic heart failure    Congestive cardiomyopathy    History of ventricular fibrillation    History of ventricular tachycardia    Heart transplant candidate    Enlarged thyroid    Abnormal CT scan    CKD (chronic kidney disease)    CHF (congestive heart failure)    Chronic systolic congestive heart failure    Systolic HF (heart failure)       Gynecologic malignancy screening. Low risk of malignancy at this time.  - Pap collected and sent to pathology lab. Will follow up results and contact patient if further management is indicated.  - Recommend mammogram as pt is due for breast cancer screening  - Recommend yearly well woman exams as an outpatient    Thank you for the consult. Please call GYN with any questions.  Will sign off.    ROSELIA Huang MD  OBGYN PGY1   [phone]    NO ACUTE GYN NEEDS, AND ROUTINE CERVICAL CANCER SCREENING SUBMITTED - COTESTING WITH PAP AND HPV  WOULD BE MOST APPROPRIATE FOR SCREENING. LVAD PLACED TODAY. WILL FOLLOW RESULTS; PLEASE RECONSULT IF GYN ISSUES DEVELOP

## 2019-09-09 NOTE — PLAN OF CARE
Problem: Adult Inpatient Plan of Care  Goal: Plan of Care Review  Outcome: Ongoing (interventions implemented as appropriate)  Updated care plan w/ pt.  Address all issues throughout shift. Pt is pending LVAD placement in the am for 0700. Pt continue on dobutamine at 5mcg/hr.. No falls today, - pt received x 1 of lasix today . Pt did have pulm fx test today, ultrasound today pt is pending pap smear today by gyn fellow

## 2019-09-09 NOTE — PROGRESS NOTES
Left Ventricular Assist Device (LVAD) and Transplant Recipient Adult Psychosocial Assessment    Deborah Ortizford  [address]  [place] [address]  Telephone Information:   Mobile [phone]   Home  [phone] (home)  Work  There is no work phone number on file.  E-mail  [email or web address]    Sex: female  YOB: 1970  Age: 48 y.o.    Encounter Date: 8/27/2019  [place] Citizen: yes  Primary Language: English   Needed: no    Emergency Contact:    Family/Social Support:   Number of dependents/: Pt denies  Marital history: Pt reports never .  Other family dynamics: Pt reports close relationship with mother and sister who both live in [place].     Household Composition: Pt lives alone.     Do you and your caregivers have access to reliable transportation? yes  PRIMARY CAREGIVER:  Flavia Nuñez will be primary caregiver, phone number [phone].      provided in-depth information to Patient and Caregiver regarding  regarding pre- and post-LVAD and pre- and post-transplant caregiver role.   strongly encourages Patient and Caregiver to have concrete plan regarding post-transplant care giving, including back-up caregiver(s) to ensure care giving needs are met as needed.    Caregiver states understanding all aspects of caregiver role/commitment and is able/willing/committed to being caregiver to the fullest extent necessary.     Patient and Caregiver verbalize understanding of the education provided today and caregiver responsibilities.       remains available. Patient and Caregiver agree to contact  in a timely manner if concerns arise.      Able to take time off work without financial concerns: yes.     Additional Significant Others who will Assist with LVAD/Transplant:    Name: Cristal Navas   Relationship: mother  Address: [place], [place]   Phone Numbers:  [phone] (mobile)  Does Person Drive: yes    Living Will:  no  Healthcare Power of : no  Advance Directives on file: <<no information> per medical record.  Verbally reviewed LW/HCPA information.   provided patient with copy of LW/HCPA documents and provided education on completion of forms.    Highest Education Level: Post-College Graduate Degree  Reading Ability: college  Reports difficulty with: comprehension, learning and memory  Learns Best By: visual     Status: no  VA Benefits: no     Working for Income: yes  If yes, working activity level: Working Full Time  Spouse/Significant Other Employment: N/A    Disabled: no. Pt is working full time for [employer] , however pt works from home most days. Pt is unable to drive because of medical condition.     Monthly Income:  Salary/Wages: $5,500 before taxes  Able to afford all costs now and if transplanted or receives LVAD, including medications: yes  Pt reports secure power source? yes  Pt reports ability to afford monthly electric bill? yes  Pt reports ability to afford LVAD dressing supplies? yes  Patient and Caregiver verbalize understanding of personal responsibilities related to LVAD and transplant costs and the importance of having a financial plan to ensure that patients LVAD and transplant costs are fully covered.       provided fundraising information/education.  Patient and Caregiver verbalize understanding.   remains available.    Insurance:   Payor/Plan Subscr  Sex Relation Sub. Ins. ID Effective Group Num   1. [facility]* YON VILLARREAL* 1970 Female  [id number]* [id number]                                   [address] [address]     Primary Insurance (for UNOS reporting): Private Insurance  Secondary Insurance (for UNOS reporting): None  Patient and Caregiver verbalize clear understanding that patient may experience difficulty obtaining and/or be denied insurance coverage post-surgery. This includes and is not limited to disability insurance, life  insurance, health insurance, burial insurance, long term care insurance, and other insurances.      Patient and Caregiver also report understanding that future health concerns related to or unrelated to LVAD or transplantation may not be covered by patient's insurance.  Resources and information provided and reviewed.      Patient provides verbal permission to release any necessary information to outside resources for patient care and discharge planning.  Resources and information provided are reviewed.      Infusion Service: patient utilizing? no. Pt reports no preference of infusion companies.   Home Health: patient utilizing? no. Pt reports preference of [facility]  if needed.   DME: no  Pulmonary/Cardiac Rehab: no. Pt participated in cardiac rehab in the past.   ADLS:  Pt reports independent in ADL although often having to move very slowly and stop to catch her breath. Pt reports she is not driving due to her medical condition.     Adherence:   Pt reports high level of adherence to health care regimen.  Adherence education and counseling provided.     Per History Section:  Past Medical History:   Diagnosis Date    Fractures     History of ventricular fibrillation 9/4/2019    History of ventricular tachycardia 9/4/2019    Hyperlipidemia     Migraine     Osteoarthritis      Social History     Tobacco Use    Smoking status: Never Smoker    Smokeless tobacco: Never Used   Substance Use Topics    Alcohol use: No     Social History     Substance and Sexual Activity   Drug Use No     Social History     Substance and Sexual Activity   Sexual Activity Not Currently       Per Today's Psychosocial:  Tobacco: none, patient denies any use.  Alcohol: none, patient denies any use.  Illicit Drugs/Non-prescribed Medications: none, patient denies any use.    Patient and Caregiver state clear understanding of the potential impact of substance use as it relates to LVAD and transplant candidacy and is aware of possible  random substance screening.  Substance abstinence/cessation counseling, education and resources provided and reviewed.     Arrests/DWI/Treatment/Rehab: patient denies    Psychiatric History:    Mental Health: depression and anxiety, pt reports symptoms of anxiety and depression due to medical condition. SW providing counseling and emotional support.   Psychiatrist/Counselor: Pt reports her pain management MD prescribes Effexor.    Medications:  Pt reports taking Effexor since 2000.  Suicide/Homicid issues: Pt denies  Safety at home: Pt reports safe at home.     Knowledge: Patient and Caregiver states having clear understanding and realistic expectations regarding the potential risks and potential benefits LVAD implantation and organ transplantation and organ donation and agrees to discuss with health care team members and support system members, as well as to utilize available resources and express questions and/or concerns in order to further facilitate the pt informed decision-making.  Resources and information provided and reviewed.     Patient and Caregiver is aware of [facility]'s affiliation and/or partnership with agencies in home health care, [place]AC, SNF, [facility] [place] and clinics.    Understanding: Patient and Caregiver reports having a clear understanding of the many lifetime commitments involved with being an LVAD and transplant recipient, including costs, compliance, medications, lab work, procedures, appointments, concrete and financial planning, preparedness, timely and appropriate communication of concerns, abstinence (ETOH, tobacco, illicit non-prescribed drugs), adherence to all health care team recommendations, support system and caregiver involvement, appropriate and timely resource utilization and follow-through, mental health counseling as needed/recommended, and patient and caregiver responsibilities.  Social Service Handbook, resources and detailed educational information provided and  reviewed.  Educational information provided.    Patient and Caregiver also reports current and expected compliance with health care regime and states having a clear understanding of the importance of compliance.       Patient and Caregiver reports a clear understanding that risks and benefits may be involved with LVAD heart failure treatment and organ transplantation and with organ donation.     Patient and Caregiver also reports clear understanding that psychosocial risk factors may affect patient, and include but are not limited to feelings of depression, generalized anxiety, anxiety regarding dependence on others, post traumatic stress disorder, feelings of guilt and other emotional and/or mental concerns, and/or exacerbation of existing mental health concerns.  Detailed resources provided and discussed.      Patient and Caregiver agrees to access appropriate resources in a timely manner as needed and/or as recommended, and to communicate concerns appropriately.     Patient and Caregiver also reports a clear understanding of treatment options available.      Feelings or Concerns: Pt reports concerned about how quickly workup if moving, and not having time to go home and plan for surgery, however pt reports willing to move forward as quickly as MD's recommend. Pt reports concerns about being a burden to caregivers. Both caregivers assured pt they are happy to assist.      Coping: Pt reports coping by talking to family and friends, watching funny TV shows, and spending time with cats.     Goals: Pt wants to travel to [facility] and return to [place].     Interview Behavior: Patient and Caregiver present as alert and oriented x 4, pleasant, good eye contact, concentration/judgement good, calm, communicative, cooperative and asking and answering questions appropriately.           Transplant Social Work - Candidacy  Assessment/Plan:     Psychosocial Suitability: Patient presents as a suitable candidate for LVAD or heart  transplant at this time. Based on psychosocial risk factors, patient presents as medium risk, due to some concern about affording high out of pocket costs and limited caregiving support.     Recommendations/Additional Comments: SW recommending pt begin fundraising. Pt reports understanding and plan to begin fundraising.     Niya Cornell, [email or web address]

## 2019-09-09 NOTE — NURSING
Resume care from previous nurse, pt voice no complaints, lying in bed w/ bed in lowest position and locked. Call bell within reach, introduce myself to pt. Dobutamine infusing at 5mcg of 105kg- 15.8ml.hr.  NS infusing at 10ml.hr by pump. Will continue to monitor pt status

## 2019-09-09 NOTE — SUBJECTIVE & OBJECTIVE
Interval History: no acute events overnight. Lasix d/c'd yesterday. Feeling well this AM. Plan for LVAD tomorrow.    Continuous Infusions:   DOBUTamine 5 mcg/kg/min (09/08/19 2001)     Scheduled Meds:   amiodarone  200 mg Oral BID    aspirin  81 mg Oral Daily    docusate sodium  100 mg Oral Daily    furosemide  20 mg Oral Daily    gabapentin  400 mg Oral TID    guaiFENesin  600 mg Oral BID    heparin (porcine)  5,000 Units Subcutaneous Q8H    magnesium citrate  296 mL Oral Once    spironolactone  25 mg Oral Daily    venlafaxine  150 mg Oral Daily     PRN Meds:acetaminophen, diphenhydrAMINE, oxyCODONE-acetaminophen, promethazine, senna, sodium chloride 0.9%, zolpidem    Review of patient's allergies indicates:   Allergen Reactions    Codeine Itching     Objective:     Vital Signs (Most Recent):  Temp: 96.4 °F (35.8 °C) (09/09/19 1200)  Pulse: 92 (09/09/19 1200)  Resp: 17 (09/09/19 1200)  BP: 101/61 (09/09/19 1200)  SpO2: 97 % (09/09/19 1200) Vital Signs (24h Range):  Temp:  [96.4 °F (35.8 °C)-98.7 °F (37.1 °C)] 96.4 °F (35.8 °C)  Pulse:  [83-94] 92  Resp:  [16-18] 17  SpO2:  [94 %-98 %] 97 %  BP: ()/(51-67) 101/61     Patient Vitals for the past 72 hrs (Last 3 readings):   Weight   09/09/19 0740 104.6 kg (230 lb 9.6 oz)   09/08/19 0747 103.7 kg (228 lb 9.9 oz)   09/07/19 0706 103.4 kg (227 lb 15.3 oz)     Body mass index is 35.36 kg/m².      Intake/Output Summary (Last 24 hours) at 9/9/2019 1307  Last data filed at 9/9/2019 0900  Gross per 24 hour   Intake 730 ml   Output 1300 ml   Net -570 ml       Hemodynamic Parameters:  CVP:  [8 mmHg] 8 mmHg    Physical Exam   Constitutional: She appears well-developed and well-nourished.   HENT:   Head: Normocephalic and atraumatic.   Eyes: Conjunctivae are normal.   Neck: No JVD present.   Unable to see JVP even with RUQ compression   Cardiovascular: Normal rate and regular rhythm.   CVC RIJ   Pulmonary/Chest: Effort normal and breath sounds normal.    Abdominal: Soft. She exhibits no distension.   Musculoskeletal: Normal range of motion.   Neurological: She is alert.   Skin: Skin is warm and dry. Capillary refill takes less than 2 seconds.   Nursing note and vitals reviewed.      Significant Labs:  CBC:  Recent Labs   Lab 09/07/19 0517 09/08/19 0511 09/09/19 0459   WBC 7.03 7.19 6.94   RBC 4.19 4.21 3.97*   HGB 12.2 12.1 11.4*   HCT 37.8 37.3 36.8*    279 256   MCV 90 89 93   MCH 29.1 28.7 28.7   MCHC 32.3 32.4 31.0*     BNP:  Recent Labs   Lab 09/06/19  1834   *     CMP:  Recent Labs   Lab 09/07/19 0517 09/08/19 0511 09/09/19 0459   * 119* 114*   CALCIUM 9.2 9.6 9.4   ALBUMIN 3.8 3.9 3.5   PROT 7.1 7.3 6.7    135* 139   K 3.5 3.7 3.8   CO2 27 27 24    98 102   BUN 26* 23* 24*   CREATININE 1.8* 1.7* 1.5*   ALKPHOS 78 86 74   ALT 14 15 17   AST 15 19 22   BILITOT 0.7 0.7 0.6      Coagulation:   Recent Labs   Lab 09/04/19  0847 09/07/19 0517   INR 1.1 1.0   APTT  --  25.3     LDH:  Recent Labs   Lab 09/06/19  1833        Microbiology:  Microbiology Results (last 7 days)     ** No results found for the last 168 hours. **          I have reviewed all pertinent labs within the past 24 hours.    Estimated Creatinine Clearance: 57.2 mL/min (A) (based on SCr of 1.5 mg/dL (H)).    Diagnostic Results:  I have reviewed and interpreted all pertinent imaging results/findings within the past 24 hours.

## 2019-09-09 NOTE — TELEPHONE ENCOUNTER
Deborah Navas was discussed at selection committee on 09/09/19. Patient presented for OHT and VAD. Declined for OHT due to:  Pulmonary hypertension, caregiving plan incomplete    Patient has an anticipated survival benefit. Patient has NYHA IV symptoms which have failed to respond to optimal medical management.     Decision made to proceed with DT VAD implantation. This has been discussed with the patient and family, including the reasons for why he is not a transplant candidate at this time.     Patient has a continued need for IV inotropic therapy, and is on dobutamine at 5 mcg/kg/min. Patient has not had a CPX due to being on inotropes. Additionally she is Intermacs 3, NYHA FC IV.     Colonoscopy and mammogram have been deferred by the committee until after LVAD implant due to how sick she is and she is currently not a transplant candidate.

## 2019-09-09 NOTE — ANESTHESIA PREPROCEDURE EVALUATION
[facility]  Anesthesia Pre-Operative Evaluation         Patient Name: Deborah Navas  YOB: 1970  MRN: [id number]    SUBJECTIVE:     Pre-operative evaluation for Procedure(s) (LRB):  INSERTION-LEFT VENTRICULAR ASSIST DEVICE (N/A)     09/09/2019    Deborah Navas is a 48 y.o. female w/ a significant PMHx of HLD, NICM: EF 20% with ICD; LVEDD: 6.8cm, V-fib (on Amiodarone), HLP, being admitted with cardiogenic shock.    Patient now presents for the above procedure(s).      LDA: None documented.       Introducer 09/04/19 1126 right internal jugular (Active)   Specific Qualities Infusing 9/8/2019  8:00 PM   Dressing Status Clean;Dry;Intact 9/8/2019  8:00 PM   Dressing Intervention Dressing reinforced 9/8/2019  8:00 PM   Dressing Change Due 09/11/19 9/8/2019  8:00 PM   Daily Line Review Performed 9/8/2019  8:00 PM   Number of days: 5            Percutaneous Central Line Insertion/Assessment - triple lumen  09/04/19 1128 right internal jugular (Active)   Dressing biopatch in place;dressing dry and intact 9/9/2019  7:20 AM   Securement secured w/ sterile tape strips 9/9/2019  7:20 AM   Additional Site Signs other (see comments) 9/8/2019  8:00 PM   Distal Patency/Care infusing 9/8/2019  8:00 PM   Medial Patency/Care flushed w/o difficulty 9/8/2019  8:00 PM   Proximal Patency/Care flushed w/o difficulty 9/8/2019  8:00 PM   Waveform normal 9/8/2019  8:00 PM   Line Interventions blood specimen obtained and sent to lab 9/4/2019  7:46 PM   Number of days: 5       Prev airway: None documented.    Drips: None documented.   DOBUTamine 5 mcg/kg/min (09/08/19 2001)       Patient Active Problem List   Diagnosis    Knee pain    Pes anserine bursitis    Cardiac defibrillator in situ    Pulmonary hypertension due to left heart disease    Ventricular fibrillation    Ventricular tachyarrhythmia     Acute on chronic combined systolic and diastolic heart failure    Congestive cardiomyopathy    History of ventricular fibrillation    History of ventricular tachycardia    Heart transplant candidate    Enlarged thyroid    Abnormal CT scan    CKD (chronic kidney disease)       Review of patient's allergies indicates:   Allergen Reactions    Codeine Itching       Current Inpatient Medications:   amiodarone  200 mg Oral BID    aspirin  81 mg Oral Daily    docusate sodium  100 mg Oral Daily    furosemide  20 mg Oral Daily    gabapentin  400 mg Oral TID    guaiFENesin  600 mg Oral BID    heparin (porcine)  5,000 Units Subcutaneous Q8H    magnesium citrate  296 mL Oral Once    spironolactone  25 mg Oral Daily    venlafaxine  150 mg Oral Daily       No current facility-administered medications on file prior to encounter.      Current Outpatient Medications on File Prior to Encounter   Medication Sig Dispense Refill    amiodarone (PACERONE) 200 MG Tab Take 200 mg by mouth 2 (two) times daily.       aspirin (ECOTRIN) 81 MG EC tablet Take 81 mg by mouth.      atorvastatin (LIPITOR) 40 MG tablet Take 40 mg by mouth once daily.  3    cpm-phenyleph-acetaminophen (NOREL AD) 4- mg Tab Take by mouth 2 (two) times daily.      docusate sodium (COLACE) 100 MG capsule Take 100 mg by mouth once daily.      furosemide (LASIX) 40 MG tablet Take 40 mg by mouth.      gabapentin (NEURONTIN) 800 MG tablet Take 400 mg by mouth 3 (three) times daily.       loratadine (CLARITIN) 10 mg tablet Take 10 mg by mouth daily as needed.      oxycodone-acetaminophen (PERCOCET) 5-325 mg per tablet Take 1 tablet by mouth every 4 (four) hours as needed for Pain.      potassium chloride (K-TAB) 20 mEq Take 2 tablets by mouth.      promethazine (PHENERGAN) 25 MG tablet Take 25 mg by mouth every 6 (six) hours as needed for Nausea.      spironolactone (ALDACTONE) 25 MG tablet Take 25 mg by mouth.      VENLAFAXINE HCL  (EFFEXOR ORAL) Take 150 mg by mouth once daily.      ZOLPIDEM TARTRATE (AMBIEN ORAL) Take 12.5 mg by mouth nightly as needed.      metOLazone (ZAROXOLYN) 5 MG tablet Take 2.5 mg by mouth daily as needed.         Past Surgical History:   Procedure Laterality Date    BACK SURGERY      2007    BONE GRAFT Left     from Left hip to Left FA    eardrum reconstruction  1980    ELBOW SURGERY Left [id number]    FOREARM FRACTURE SURGERY Bilateral [id number]    multiple surgeries    INSERTION OF IMPLANTABLE CARDIOVERTER-DEFIBRILLATOR (ICD) GENERATOR WITH TWO EXISTING LEADS      INSERTION OF PACEMAKER Left     INSERTION, CATHETER, RIGHT HEART Right 9/4/2019    Performed by [facility] at [facility] C[facility]    LUMBAR FUSION  2007    L4-L5    SINUS SURGERY Right 1994    with lymph nodes    TEMPOROMANDIBULAR JOINT SURGERY Right 1988    TONSILLECTOMY      TYMPANOSTOMY TUBE PLACEMENT  1971- 1979    multiple tube placements       Social History     Socioeconomic History    Marital status: Single     Spouse name: Not on file    Number of children: Not on file    Years of education: Not on file    Highest education level: Not on file   Occupational History    Not on file   Social Needs    Financial resource strain: Not on file    Food insecurity:     Worry: Not on file     Inability: Not on file    Transportation needs:     Medical: Not on file     Non-medical: Not on file   Tobacco Use    Smoking status: Never Smoker    Smokeless tobacco: Never Used   Substance and Sexual Activity    Alcohol use: No    Drug use: No    Sexual activity: Not Currently   Lifestyle    Physical activity:     Days per week: Not on file     Minutes per session: Not on file    Stress: Not on file   Relationships    Social connections:     Talks on phone: Not on file     Gets together: Not on file     Attends Confucianist service: Not on file     Active member of club or organization: Not on file     Attends meetings of clubs or  organizations: Not on file     Relationship status: Not on file   Other Topics Concern    Not on file   Social History Narrative    Not on file       OBJECTIVE:     Vital Signs Range (Last 24H):  Temp:  [35.8 °C (96.4 °F)-37.1 °C (98.7 °F)]   Pulse:  [83-94]   Resp:  [16-18]   BP: ()/(51-67)   SpO2:  [94 %-98 %]       Significant Labs:  Lab Results   Component Value Date    WBC 6.94 09/09/2019    HGB 11.4 (L) 09/09/2019    HCT 36.8 (L) 09/09/2019     09/09/2019    CHOL 110 (L) 09/06/2019    TRIG 86 09/06/2019    HDL 47 09/06/2019    ALT 17 09/09/2019    AST 22 09/09/2019     09/09/2019    K 3.8 09/09/2019     09/09/2019    CREATININE 1.5 (H) 09/09/2019    BUN 24 (H) 09/09/2019    CO2 24 09/09/2019    TSH 2.290 09/06/2019    INR 1.0 09/07/2019    HGBA1C 6.5 (H) 09/06/2019       Diagnostic Studies: No relevant studies.    EKG:   Results for orders placed or performed during the hospital encounter of 09/04/19   EKG 12-LEAD    Collection Time: 09/04/19  7:26 PM    Narrative    Test Reason : I50.43,    Vent. Rate : 063 BPM     Atrial Rate : 063 BPM     P-R Int : 180 ms          QRS Dur : 130 ms      QT Int : 492 ms       P-R-T Axes : -10 -66 -05 degrees     QTc Int : 503 ms    Atrial-sensed ventricular-paced rhythm  Abnormal ECG  No previous ECGs available  Confirmed by MARILOU FRANK MD ([id number]) on 9/5/2019 1:45:36 PM    Referred By: FILI BAEZ           Confirmed By:MARILOU FRANK MD       2D ECHO:  TTE:  Results for orders placed or performed during the hospital encounter of 09/04/19   Echo Color Flow Doppler? Yes   Result Value Ref Range    BSA 2.23 m2    TDI SEPTAL 0.07 m/s    LV LATERAL E/E' RATIO 11.60 m/s    LV SEPTAL E/E' RATIO 16.57 m/s    LA WIDTH 4.60 cm    TDI LATERAL 0.10 m/s    LVIDD 6.54 (A) 3.5 - 6.0 cm    IVS 0.47 (A) 0.6 - 1.1 cm    PW 0.68 0.6 - 1.1 cm    LVIDS 5.93 (A) 2.1 - 4.0 cm    FS 9 28 - 44 %    LA volume 98.02 cm3    Sinus 2.99 cm    STJ 2.90 cm    Ascending  aorta 2.64 cm    LV mass 146.23 g    LA size 4.24 cm    RVDD 4.49 cm    TAPSE 3.34 cm    Left Ventricle Relative Wall Thickness 0.21 cm    AV mean gradient 3 mmHg    AV valve area 2.98 cm2    AV Velocity Ratio 1.12     AV index (prosthetic) 0.95     E/A ratio 2.04     Mean e' 0.09 m/s    E wave decelartion time 124.63 msec    Pulm vein S/D ratio 0.98     LVOT diameter 2.00 cm    LVOT area 3.1 cm2    LVOT peak scott 1.21 m/s    LVOT peak VTI 19.63 cm    Ao peak scott 1.08 m/s    Ao VTI 20.70 cm    LVOT stroke volume 61.64 cm3    AV peak gradient 5 mmHg    E/E' ratio 13.65 m/s    MV Peak E Scott 1.16 m/s    TR Max Scott 3.07 m/s    MV Peak A Scott 0.57 m/s    PV Peak S Scott 0.60 m/s    PV Peak D Scott 0.61 m/s    LV Systolic Volume 175.28 mL    LV Systolic Volume Index 81.3 mL/m2    LV Diastolic Volume 218.90 mL    LV Diastolic Volume Index 101.49 mL/m2    LA Volume Index 45.4 mL/m2    LV Mass Index 68 g/m2    RA Major Axis 5.60 cm    Left Atrium Minor Axis 5.79 cm    Left Atrium Major Axis 6.04 cm    Triscuspid Valve Regurgitation Peak Gradient 38 mmHg    RA Width 4.67 cm    Right Atrial Pressure (from IVC) 3 mmHg    TV rest pulmonary artery pressure 41 mmHg    Narrative    · Severe left ventricular enlargement.  · Severely decreased left ventricular systolic function. The estimated   ejection fraction is 15%  · Mild right ventricular enlargement.  · Normal right ventricular systolic function.  · Grade III (severe) left ventricular diastolic dysfunction consistent   with restrictive physiology.  · Moderate biatrial enlargement.  · Mild tricuspid regurgitation.  · Mild-to-moderate mitral regurgitation.  · The estimated PA systolic pressure is 41 mm Hg  · Normal central venous pressure (3 mm Hg).          NADINE:  No results found for this or any previous visit.    ASSESSMENT/PLAN:         Anesthesia Evaluation    I have reviewed the Patient Summary Reports.     I have reviewed the Medications.     Review of Systems  Anesthesia  Hx:  No problems with previous Anesthesia  History of prior surgery of interest to airway management or planning: Previous anesthesia: General   Hematology/Oncology:  Hematology Normal   Oncology Normal     EENT/Dental:EENT/Dental Normal   Cardiovascular:   Pacemaker Denies Hypertension.  Denies Valvular problems/Murmurs.  Denies MI.  Denies CAD.    Denies CABG/stent.  CHF hyperlipidemia ECG has been reviewed.    Pulmonary:  Pulmonary Normal    Renal/:   Chronic Renal Disease    Hepatic/GI:  Hepatic/GI Normal    Musculoskeletal:   Arthritis     Neurological:   Denies Neuromuscular Disease. Headaches    Endocrine:   Denies Diabetes. Denies Hypothyroidism. Denies Hyperthyroidism.    Psych:  Psychiatric Normal           Physical Exam  General:  Well nourished    Airway/Jaw/Neck:  Airway Findings: Mouth Opening: Small, but > 3cm Tongue: Large  General Airway Assessment: Adult  Mallampati: III  Improves to III with phonation.  TM Distance: 4 - 6 cm  Jaw/Neck Findings:  Neck ROM: Normal ROM     Eyes/Ears/Nose:  EYES/EARS/NOSE FINDINGS: Normal    Chest/Lungs:  Chest/Lungs Findings: Clear to auscultation, Normal Respiratory Rate     Heart/Vascular:  Heart Findings: Rate: Normal  Rhythm: Regular Rhythm  Sounds: Normal  Vascular Findings:  Edema Locations: LLE, RLE  Edema: +1 or +2     Abdomen:  Abdomen Findings:  Normal, Nontender, Soft     Musculoskeletal:  Musculoskeletal Findings:    Skin:  Skin Findings:     Mental Status:  Mental Status Findings:  Cooperative, Alert and Oriented         Anesthesia Plan  Type of Anesthesia, risks & benefits discussed:  Anesthesia Type:  general  Patient's Preference:   Intra-op Monitoring Plan: arterial line, central line, [place] and standard ASA monitors  Intra-op Monitoring Plan Comments:   Post Op Pain Control Plan: multimodal analgesia, IV/PO Opioids PRN and per primary service following discharge from PACU  Post Op Pain Control Plan Comments:   Induction:   IV  Beta Blocker:   Patient is not currently on a Beta-Blocker (No further documentation required).       Informed Consent: Patient understands risks and agrees with Anesthesia plan.  Questions answered. Anesthesia consent signed with patient.  ASA Score: 4     Day of Surgery Review of History & Physical:  There are no significant changes.          Ready For Surgery From Anesthesia Perspective.

## 2019-09-09 NOTE — PROGRESS NOTES
Patient and caregiver have read and signed Phase 1 VAD education.  I have read and reviewed the contents of this in detail and all questions have been answered to patient and caregiver's satisfaction as evidence by verbal acknowledgement.     Verbal and written VAD Education:     Please carefully read and review the following statement, and, sign to indicate you have been fully informed about the candidacy and evaluation process for the mechanical circulatory support device (MCSD) also known as the Ventricular Assist Device (VAD).     Why a VAD Is Needed   Heart failure is defined as a condition in which your heart is unable to pump enough blood to support the basic needs of your body. This can make you feel tired, have abnormal rhythms, and shortness of breath. Heart failure can also lead to failure of other organs (e.g. liver or kidneys). You are being offered this treatment option because you have a marked increase risk of irreversible end-organ damage or death. For this reason, you are being considered for placement of a Left Ventricular Assist Device (VAD) at [facility]. The heart pump is designed to take over the pumping action of your heart.   Prior to undergoing this procedure, it is important that you and your family understand the options, benefits, risks, and expectations associated with having a VAD. It is required that you and your proposed caregiver(s) understand and agree with the treatment plan and are willing to participate in the guidelines outlined in the following pages.     Bridge to transplant (BTT) is when a VAD is used to help support heart function for someone waiting for a heart transplant. This treatment plan is subject to change pending the results from your evaluation and your physicians decision. If your medical condition worsens after you receive the VAD, you may not be a transplant candidate.   The information within this document pertains only to VAD therapy. You will  receive information regarding heart transplantation allocation, procedures, and risks from the Pre-Transplant when appropriate.   OR   Destination therapy is when a VAD is used as a long-term treatment for patients who are not candidates for transplant, such as those with end-stage congestive heart failure. In these patients, the pumps are placed permanently to help the heart work better. This is subject to change pending the results from your evaluation and your physicians decision.     Types of Ventricular Assist Devices:   There are several different types of mechanical circulatory support devices:   -Left ventricular assist devices (LVAD) help the left side of the heart pump blood to the largest artery of the body, the aorta.     Your VAD Team may also talk with you about:   -Right ventricular assist devices (RVAD) help the right side of the heart pump blood to the lungs.   -Total Artificial Heart (TIFFANY) that replaces the heart and pumps blood to the body.   A VAD is a long-term device utilized by patients for months to years. Some patients may receive another device prior to receiving a VAD depending on their treatment and health status.   When Is a VAD Used?   When medications can no longer help, and other surgical options have been exhausted, a physician may recommend a VAD. A VAD is used to assist the pumping action of a severely weakened heart. It works with your heart to improve and to increase blood flow; it does not replace your own heart. VADs are most often used for patients experiencing New York Heart Association (NYHA) Class III-IV heart failure symptoms.     Alternative Options:   If you are not found to be a candidate for VAD implantation or if you decide that a VAD is not the best option for you, you will continue to receive customary standard of care. You may also continue optimal medical management alone including the use of inotrope therapy. An inotrope is an IV medication that helps the  strength of the hearts contraction. However, the reason that you are being considered for VAD implantation is because optimal medical management has not been adequate and without a VAD, your condition is likely to deteriorate over time. While going straight to transplant may be a possibility, death is a possibility as well.   You May Not Be Eligible To Receive A VAD If You Are Found To Have Any Of The Following:      Any irreversible non-cardiac disease state with less than 2-year survival rate    Inadequate social support to be successful at home after surgery    Irreversible pulmonary disease or fixed pulmonary hypertension    Irreversible renal disease    Irreversible liver disease    Unresolved stroke or uncorrected cerebral vascular disease    A history of chronic noncompliance    Using illicit drugs or alcohol    Uncorrected thyroid disease    Significant right ventricular failure    Obstructive or restrictive cardiomyopathy    Amyloidosis    Active pericardial disease    Untreated aortic aneurysm    Irreversible cognitive dysfunction    History of psychiatric disease, uncontrolled affective disorder, or any cognitive dysfunction that may prevent you from managing self-care    Diabetes with severe retinopathy or peripheral neuropathy    Obesity with BMI (body mass index) greater than 35    Severe chronic malnutrition    Uncorrected blood disorders    Active uncontrolled infection    Pregnancy (positive pregnancy test)    HIV positive or immunosuppression that could result in device infection    Muscular dystrophy, MS or similar disease states    Active systemic infection    Cancer    Insufficient funding     Possible Benefits:   The overall goal is improved health and quality of life. In most cases, because circulation has been restored as a result of the VAD, you can expect to have more energy and also experience less heart failure symptoms. Since VADs help deliver more oxygen rich  blood, you may feel well enough to resume many of the usual activities and hobbies that you enjoy. In fact, many patients return to work and are no longer disabled, depending on the type of work they do. Be aware that you may lose your disability post VAD based on review of your records and disability benefits. It is important that you speak with a  so that you may plan for this should it occur.   The improved circulation may prolong life and may improve some organ damage caused by your heart failure. This is supported by some studies that have shown that VAD patients have a longer survival than patients treated with medications alone. Although the VAD can improve your chances for survival, the type and severity of your heart disease may outweigh any benefits from the device and you may still die.     Possible Surgical/ Anesthesia Risks:   As with any surgery or procedure, there are risks and the possibility of complications or death. There are also risks related to the operation itself and undergoing anesthesia, and risks related to the device itself. You will discuss the risks in detail with the Cardiac Surgeon who intends to perform your surgery. Your surgeon and anesthesia provider will review consent forms prior to surgery.     Operative Procedure Risks:   There are many risks with this operation including but not limited to: death, heart attack, stroke, nerve injury, blood clots, damage to arteries needing limb amputation, bleeding and hemorrhage, hemolysis, infection, development of new antibodies in your blood, mediastinitis, arrhythmia, right heart failure, heart block, or the need for pacemaker or ICD implantation. The need for re-operation for any reason may cause: renal, hepatic or pulmonary failure resulting in death or long-term need of ventilation or dialysis, and blood transfusion with its risk of HIV, and hepatitis. Studies have also shown that patients may have problems  with memory, attention, and speed of processing thoughts after a cardiac surgical procedure. Any of these complications will be explained to you in more detail if you desire. In addition to these potential complications, there may be other risks that are currently unknown.     Risks Related to VAD Therapy:   Include but not limited to: death, need for re-operation, device malfunction or device infection, renal failure requiring dialysis, blood clots, stroke, pain, or bleeding. These risks may lead to prolonged hospital stay or re-hospitalization. Pump exchange due to complications is a possibility. Patients may also experience a potential decrease in their quality of life including limitations of their normal activities. Patients may reach a point where quality of life is so impaired, that the decision to terminate VAD-support will need to be addressed. The longer you are on the device, the greater the chances that complications will develop.   Please see addendum for likelihood of these risks impacting you in your VAD therapy journey.     Evaluation Process:   There will be many people involved in the evaluation process to assure that this is the best choice for you. You will receive a number of tests and consultations. Some of the people that may help evaluate you include Heart Failure Physicians, Cardiac Surgeons, Social Workers and VAD Coordinators. During the evaluation process, you will be given education about the VAD and the care you would require. After the evaluation, the group will decide if you meet the criteria to have a VAD implanted. You may require or have already been implanted with a short-term VAD prior to surgery for a long-term VAD.     Care Team   Additionally, you will meet with a number of different team members to coordinate your care: financial counselor to discuss insurance and dressing supplies, research coordinators, anesthesiologist, psychiatrist/psychologist, physical therapist or  occupational therapist to discuss rehabilitation after VAD, and dietician to improve nutrition. Some patients may be referred to other services for consultation. These may include, but are not limited to: infectious disease doctor, gastroenterologist, nephrologist (kidney doctor), pulmonologist (lung doctor), hepatologist (liver doctor), or an ophthalmologist (eye doctor). It is recommended that you see your dentist to ensure no infection is present and all needed dental work is completed before surgery. Cavities and rotten teeth can cause an infection which can lead to death.   Testing is required to determine VAD candidacy. These include but are not limited to the following:   Laboratory studies of blood and urine, chest x-ray, abdominal ultrasound, CT scan, echocardiogram, cardiopulmonary treadmill, right heart catheterization, electrocardiogram, pulmonary function tests, colonoscopy or endoscopy, vascular studies, and pulmonary studies.     Device Choice:   VADs are currently approved by the Food and Drug Administration (FDA) to be used as Bridge to Transplantation (BTT) or Destination Therapy (DT). A full list will be provided for you and your family to review if desired. [facility] also participates in clinical trials with devices that are considered investigational, and are not yet FDA approved for BTT/DT. Your Surgeon and Cardiologist will discuss with you which device is the best option for you. VADs have four main parts: the implantable heart pump, a tube that passes through the skin of your abdomen (driveline), a controller (small computer) that controls the pump operation, and an external power source (batteries or power device). In addition, there are other VADs that are used temporarily when patients are in cardiogenic shock.   You have the right to refuse surgery at any time. This educational document consent will help you to make an informed decision about your VAD option. If you decide this  is not the best option for you , please make your physician aware. You and your family make the decision to proceed.   Pre-operative Care expectations:    Your surgeon will request a tentative operative date in your name in the event that VAD therapy is the right option defined by you, your family, and provider team. You may see this date on [facility].    Informed surgical and anesthesia consents will be completed prior to the procedure.    You will be admitted to the hospital a few days before the day of surgery for optimization before surgery (unless already hospitalized).    Intra-aortic balloon pump or impella will be placed before surgery    If you are listed for transplant, once you go for the VAD your listing status will change. This will be discussed with you by your Transplant team.    If you have an ICD (defibrillator), it will be turned off prior to surgery, and then turned back on after surgery. It will NOT be removed.     Surgical Procedure:   The surgical procedure to implant the VAD will require open-heart surgery and can take on average between 6-12 hours. The surgeon will need to make an incision down the front of your chest to reach your heart. You will have a breathing tube and ventilator while under general anesthesia. The VAD is placed below the heart and the surgeon will connect the pump to your heart and secure it in place with sutures. Once the pump is in place, the VAD along with your natural heart will resume pumping blood through your body. After the surgery is completed, you will receive care in the ICU.     Post-Operative Care Expectations:   Upon arrival to the ICU, you will receive close monitoring and support from the following medical mechanisms:    Upon arrival to ICU, please adhere to our ICU (intensive care unit) visiting hours. You will need to rest and we ask that family not stay the night for a few days.    Heart monitor (telemetry) to monitor heart rate and rhythm.     A breathing tube (endotracheal tube) and ventilator to assist with breathing and maintain and open airway.    An oral-gastric tube will be utilized to keep the stomach empty when connected to suction, as well as to give the nursing staff the capability to administer oral medications directly into the stomach.    A Fonseca catheter to measure urinary output.    A [place] Catheter to measure pressures within the heart and lungs.    An arterial line catheter in order to measure arterial blood pressure.    Chest tubes to collect and measure drainage from surgery.    A VAD driveline that exits the skin in the abdominal area and is connected to the VAD power source.    Your chest may be left open for 24 to 72 hours after implantation, after which you will be taken back to the operating room to close your chest.     You will receive medications for sedation and to control your pain in order to achieve a tolerable level of comfort. You will also be on IV medications until your blood pressure and fluid status are stable. Your home medications will be resumed as soon as possible if still medically relevant. In addition to your previous taken medications, patients with VADs are commonly prescribed medications for anticoagulation/anti-platelet, antibiotics, blood pressure, and vitamin/mineral supplements. Your length of stay in the ICU will depend on how fast you recover. Once you are more stable, breathing on your own with your lines and tubes out, you will be transferred to a general care unit where you can expect to stay for another 1-3 weeks. On average, your total length of stay will be about three or four weeks after your surgery. During this time, it is expected that you and your family will begin to learn to manage   the device and learn how to manage your care at home. Most patients are able to return home after VAD implantation, but this cannot be guaranteed. Complications may require a prolonged period of  hospitalization, long term acute care facility, or inpatient rehabilitation. Be aware, if you are unattended and the device fails, you may not be able to perform the emergency procedures yourself, which could result in death and/or blood clots in the device.     Education:   Verbal, written, and visual educational materials are provided throughout your hospitalization and are available to anyone involved in your care at home. You and your caregiver(s) will be trained by a VAD Coordinator on how to manage your care and device. Other staff such as your bedside Nurse, the Occupational and Physical Therapists will also provide training to you. You and your caregiver(s) must show ability to manage the device, understand how it operates, troubleshoot problems, and care for your driveline exit site. It is expected that a caregiver(s) will be present and available while you are in the hospital to learn how to manage the device and how to care for you when you are at home. The education will be an ongoing process while you are here at the hospital. Prior to your expected discharge, your family and/or caregivers will be required to show competency in the care and management of you and your VAD. In addition, a VAD Coordinator will ensure that your local fire department, emergency personnel, and any other community members will be given education materials and training as necessary. Your home must have consistent electricity and phone services; the outlets must be three pronged and grounded. Any additional safety needs are arranged during this time. You will need to have your glasses and hearing aids at the hospital in order to be educated, as soon as possible.     Caregiver Requirements:   VAD implantation is based on suitability, need, and a committed caregiver. The caregiver must agree to certain responsibilities for the VAD implant process to continue. This is not negotiable. The VAD patient may be completely dependent on  the caregiver. While the patient is in the hospital, the caregiver is expected to visit daily, preferably at a time between 8 am- 4 pm. The caregiver will need to learn the dressing change process by the nurses and consistently demonstrate the ability to perform VAD dressing change in addition to helping the patient learn about the VAD. If the patient is unable to meet education goals before discharge, they may need 24-hour care. 24-hour caregivers may experience increased stress in their day-to-day life resulting from caring for a loved one with a VAD. There are support groups available to help you through living with a VAD.   The patient will not be able to drive for several months. The caregiver will need to drive the patient to appointments, as frequently as two to three times weekly at first. The caregiver will need to assist the patient with medication management. The caregiver will need to encourage the patient to document VAD numbers daily and know when to call for a problem.   Education begins now and is on-going throughout the VAD patients life.   The VAD team recognizes that VAD patients have an increased satisfactory outcome with a dedicated and committed caregiver. The caregiver has a tremendous responsibility. It is essential for you, the caregiver, to understand what is expected prior to moving forward. Any concerns about being the caregiver should be discussed with the , the VAD coordinator, or the physician.     Discharge Process:   Your daily progress will be followed by a team of people involved in your care including your Surgeon, Cardiologist, VAD Coordinators, Staff Nurses, Nurse Practitioners, Physician Assistants, Physical/Occupational Therapy, Social Workers, and a Discharge Planner. They will monitor your recovery and help you to adjust to life with a VAD. You will need to demonstrate the ability to care for yourself, such as grooming and exercise. You will also need to  demonstrate the ability to care for your VAD, the equipment, and alarms. You must have a thermometer, weight scale, flashlight, and a blood pressure cuff at your home. Most patients return home however, some patients choose to live with a caregiver or need a rehabilitation facility for a short period before returning home. If insurance allows, a Home Health nurse will be recommended to come to your home and assist you in your care when you return home. The length of time that the Visiting Nurse will come to your home will depend on your overall recovery. It is recommended  after you return home that you enroll in a Cardiac Rehab program to continue to improve your physical health.     Follow up care:   After you are discharged, you will follow up with your Surgeon, your Heart Failure Cardiologist and your VAD coordinator. They will collaboratively care for you and make decisions about your treatment. Typically, your first visit will be 1 week after discharge. We will see you every week for 3-4 weeks, followed by every 2 weeks for 1 month, then monthly thereafter while you have the VAD. Once you are considered a stable established patient, your Physicians may decide that you can follow-up every 2-3 months. Along with seeing your Cardiologist and Surgeon, you will have laboratory testing, and other physiological testing done on a regular basis in order to monitor and maintain your progress and health. The types of testing that you may need and the frequency will be decided by your Physicians but can include blood tests, EKG, Echocardiogram, Right Heart Catheterization, V02 Treadmill Stress Test, and Implantable Cardioverter Defibrillator (ICD) device check. If you have received an investigational VAD, you will have other testing that will be required for the research study. You will be required to take anticoagulation medications, also known as blood thinning medications (coumadin, warfarin). You will   be required  to have frequent blood draws, up to 2-3 times a week, in order to monitor your blood count and blood thinning agents. You will also be in frequent contact with a VAD Coordinator who will make phone calls to assess how you are doing at home and assist you with any problems that may arise. A VAD Coordinator and a Heart Failure Cardiologist are also available 24 hours a day in the event that you have an emergency. On average, you can expect that within 12 weeks after surgery, you will be able to return to most activities, with the permission of your VAD Team.     Lifestyle Changes and Prohibited Activities:   You will have limitations and can resume most usual activities. Certain activities are hazardous or fatal after implant. Persons with implantable VADs must not allow their controller/computer and electrical equipment to submerge in water. Showering is possible with proper protective equipment. You may only resume showering once your driveline has healed and your VAD coordinator gives their permission. Swimming and baths are prohibited. You cannot sleep on your stomach or the side the driveline is exiting your abdomen. Contact sports, repetitive jumping, or impact with an airbag are examples of activities that may cause trauma to the pump attachments and must be avoided.   You may be sexually active but must care for your driveline. Female patients cannot get pregnant. Medical care after implant includes lifetime follow up to monitor device function and health status. You may not have a magnetic resonance imaging (MRI) test because of the magnetic fields. You may not vacuum, touch television or computer screens, or take hot clothes out of the dryer due to the static electricity. VAD therapy requires significant self-care responsibility and a willingness to participate with you VAD team. Driveline exit site dressings must be performed daily using sterile technique outlined in your care of driveline documentation or  as directed by your VAD team. Care of your driveline must be done daily by yourself or caregiver. Maintenance care of the device components, batteries, and driveline is necessary to prevent pump failure, infections, or other serious complications. You will continue to take medications for your heart failure although the dose and or medication name may change. You may continue to take your diuretic such as lasix or demedex. You may also continue to be on a fluid restriction. You may drive once approved by your VAD team. You may be able to travel with your VAD, depending on the situation.     VAD Equipment:   Along with the device that is implanted inside your body, you will have a number of other external pieces of equipment that will require care and maintenance. You will have a driveline that exits your body through your abdomen that will power a controller, which is the computer component that tells the heart pump how to perform. The controller will also tell you about alarms, sounds, and words, on how your pump is operating and if there are any problems. In order to power the device and the controller, you will have batteries and a battery charger and/or power device. The batteries allow you to be mobile and move freely without being attached to outlet power. The  or power device allows   you to be connected to power for long periods of time such as when you are sleeping. Different VADs have similar pieces of equipment, but will vary depending on the device you receive. You will receive education and teaching before you leave the hospital to make sure you understand clearly how to operate the equipment and troubleshoot problems that may occur.     Confidentiality:   Hospital personnel who are involved in the course of your care may review your medical record. Communication between you and [facility] remains confidential. If you do become a candidate for transplant, data about your case, which will include  your identity, will be sent to [employer] ([employer]) and may be sent to other places involved in the transplant process as permitted by law. Data about your VAD, which will include your identity, will be shared with the  of the device. Device  may be involved in your care alongside your VAD Team in the hospital and/or clinic setting. Your information may also be entered into a registry for pump implants, known as INTERMACS. Your participation in this is voluntary, and will be discussed at greater length prior to implant.     End of Life:   If you are approaching the end of life, the VAD can be turned off. You may be in a hospital, home, or hospice setting. If you become very sick and do not have a chance to survive, we may talk about stopping the pump. The doctor would talk to you or your family about what is right for you. It is helpful to talk to your family about your goals before surgery so they know what your wishes are. A member of our Goals of Care team will visit you before surgery to help you learn your goals. You have the right to have the device turned off or to decline pump exchange if your pump fails or malfunctions.     Device Return:   At the time of either transplant or death, the surgeon may need to remove the device to return to the . You or a family member may need to sign a separate consent for removal of the device.   Questions   We encourage you to learn everything you can about the potential benefits and risks of having a VAD. If you or your family has any questions, you should feel free to contact your Transplant Coordinator or VAD Coordinator.   By signing this form, you understand and have reviewed the implant procedure as well as the potential benefits and risks involved with the getting a VAD. You also acknowledge and understand the care that will be required to maintain this device and yourself, including changes in your  lifestyle, and impact on your independence.

## 2019-09-10 ENCOUNTER — SOCIAL WORK (OUTPATIENT)
Dept: TRANSPLANT | Facility: CLINIC | Age: 49
End: 2019-09-10

## 2019-09-10 ENCOUNTER — ANESTHESIA (OUTPATIENT)
Dept: SURGERY | Facility: HOSPITAL | Age: 49
DRG: 001 | End: 2019-09-10
Payer: COMMERCIAL

## 2019-09-10 PROBLEM — Z95.811 LVAD (LEFT VENTRICULAR ASSIST DEVICE) PRESENT: Status: ACTIVE | Noted: 2019-09-10

## 2019-09-10 PROBLEM — R73.9 ACUTE HYPERGLYCEMIA: Status: ACTIVE | Noted: 2019-09-10

## 2019-09-10 PROBLEM — Z51.5 PALLIATIVE CARE ENCOUNTER: Status: ACTIVE | Noted: 2019-09-10

## 2019-09-10 LAB
ALBUMIN SERPL BCP-MCNC: 3.7 G/DL (ref 3.5–5.2)
ALLENS TEST: ABNORMAL
ALLENS TEST: NORMAL
ALP SERPL-CCNC: 76 U/L (ref 55–135)
ALT SERPL W/O P-5'-P-CCNC: 23 U/L (ref 10–44)
ANION GAP SERPL CALC-SCNC: 12 MMOL/L (ref 8–16)
ANION GAP SERPL CALC-SCNC: 13 MMOL/L (ref 8–16)
ANION GAP SERPL CALC-SCNC: 13 MMOL/L (ref 8–16)
ANION GAP SERPL CALC-SCNC: 14 MMOL/L (ref 8–16)
ANISOCYTOSIS BLD QL SMEAR: SLIGHT
APTT BLDCRRT: 23.7 SEC (ref 21–32)
APTT BLDCRRT: 27.7 SEC (ref 21–32)
APTT BLDCRRT: 33.3 SEC (ref 21–32)
AST SERPL-CCNC: 33 U/L (ref 10–40)
BASOPHILS # BLD AUTO: 0.01 K/UL (ref 0–0.2)
BASOPHILS # BLD AUTO: 0.02 K/UL (ref 0–0.2)
BASOPHILS # BLD AUTO: 0.03 K/UL (ref 0–0.2)
BASOPHILS # BLD AUTO: 0.06 K/UL (ref 0–0.2)
BASOPHILS NFR BLD: 0.1 % (ref 0–1.9)
BASOPHILS NFR BLD: 0.2 % (ref 0–1.9)
BASOPHILS NFR BLD: 0.2 % (ref 0–1.9)
BASOPHILS NFR BLD: 0.9 % (ref 0–1.9)
BILIRUB SERPL-MCNC: 0.5 MG/DL (ref 0.1–1)
BLD PROD TYP BPU: NORMAL
BLOOD UNIT EXPIRATION DATE: NORMAL
BLOOD UNIT TYPE CODE: 5100
BLOOD UNIT TYPE CODE: 7300
BLOOD UNIT TYPE CODE: 9500
BLOOD UNIT TYPE: NORMAL
BUN SERPL-MCNC: 21 MG/DL (ref 6–20)
BUN SERPL-MCNC: 22 MG/DL (ref 6–20)
BUN SERPL-MCNC: 23 MG/DL (ref 6–20)
BUN SERPL-MCNC: 25 MG/DL (ref 6–20)
CALCIUM SERPL-MCNC: 8.8 MG/DL (ref 8.7–10.5)
CALCIUM SERPL-MCNC: 9.1 MG/DL (ref 8.7–10.5)
CALCIUM SERPL-MCNC: 9.1 MG/DL (ref 8.7–10.5)
CALCIUM SERPL-MCNC: 9.7 MG/DL (ref 8.7–10.5)
CARDIOLIPIN IGG SER IA-ACNC: <9.4 GPL (ref 0–14.99)
CARDIOLIPIN IGM SER IA-ACNC: <9.4 MPL (ref 0–12.49)
CHLORIDE SERPL-SCNC: 102 MMOL/L (ref 95–110)
CHLORIDE SERPL-SCNC: 103 MMOL/L (ref 95–110)
CHLORIDE SERPL-SCNC: 104 MMOL/L (ref 95–110)
CHLORIDE SERPL-SCNC: 104 MMOL/L (ref 95–110)
CLASS I ANTIBODY COMMENTS - LUMINEX: NORMAL
CLASS II ANTIBODIES - LUMINEX: NEGATIVE
CMV IGG SERPL QL IA: NORMAL
CO2 SERPL-SCNC: 22 MMOL/L (ref 23–29)
CODING SYSTEM: NORMAL
COTININE SERPL-MCNC: <3 NG/ML
CPRA %: 0
CREAT SERPL-MCNC: 1.5 MG/DL (ref 0.5–1.4)
CREAT SERPL-MCNC: 1.8 MG/DL (ref 0.5–1.4)
DELSYS: ABNORMAL
DELSYS: NORMAL
DIFFERENTIAL METHOD: ABNORMAL
DISPENSE STATUS: NORMAL
DNA1Q TESTING DATE: NORMAL
DNA2Q TESTING DATE: NORMAL
EBV VCA IGG SER QL IA: POSITIVE
EOSINOPHIL # BLD AUTO: 0 K/UL (ref 0–0.5)
EOSINOPHIL # BLD AUTO: 0.1 K/UL (ref 0–0.5)
EOSINOPHIL # BLD AUTO: 0.3 K/UL (ref 0–0.5)
EOSINOPHIL # BLD AUTO: 0.3 K/UL (ref 0–0.5)
EOSINOPHIL NFR BLD: 0.3 % (ref 0–8)
EOSINOPHIL NFR BLD: 0.4 % (ref 0–8)
EOSINOPHIL NFR BLD: 1.4 % (ref 0–8)
EOSINOPHIL NFR BLD: 4.7 % (ref 0–8)
ERYTHROCYTE [DISTWIDTH] IN BLOOD BY AUTOMATED COUNT: 14.6 % (ref 11.5–14.5)
ERYTHROCYTE [SEDIMENTATION RATE] IN BLOOD BY WESTERGREN METHOD: 14 MM/H
EST. GFR  (AFRICAN AMERICAN): 37.6 ML/MIN/1.73 M^2
EST. GFR  (AFRICAN AMERICAN): 46.8 ML/MIN/1.73 M^2
EST. GFR  (NON AFRICAN AMERICAN): 32.6 ML/MIN/1.73 M^2
EST. GFR  (NON AFRICAN AMERICAN): 40.6 ML/MIN/1.73 M^2
FIBRINOGEN PPP-MCNC: 281 MG/DL (ref 182–366)
FIO2: 40
FIO2: 40
FIO2: 50
FIO2: 60
FIO2: 60
FIO2: 70
FLOW: 5
FLOW: 5
GLUCOSE SERPL-MCNC: 117 MG/DL (ref 70–110)
GLUCOSE SERPL-MCNC: 154 MG/DL (ref 70–110)
GLUCOSE SERPL-MCNC: 181 MG/DL (ref 70–110)
GLUCOSE SERPL-MCNC: 182 MG/DL (ref 70–110)
GLUCOSE SERPL-MCNC: 182 MG/DL (ref 70–110)
GLUCOSE SERPL-MCNC: 186 MG/DL (ref 70–110)
GLUCOSE SERPL-MCNC: 186 MG/DL (ref 70–110)
GLUCOSE SERPL-MCNC: 198 MG/DL (ref 70–110)
GLUCOSE SERPL-MCNC: 218 MG/DL (ref 70–110)
HCO3 UR-SCNC: 16.5 MMOL/L (ref 24–28)
HCO3 UR-SCNC: 22.1 MMOL/L (ref 24–28)
HCO3 UR-SCNC: 23.9 MMOL/L (ref 24–28)
HCO3 UR-SCNC: 24.1 MMOL/L (ref 24–28)
HCO3 UR-SCNC: 24.2 MMOL/L (ref 24–28)
HCO3 UR-SCNC: 25 MMOL/L (ref 24–28)
HCO3 UR-SCNC: 25.4 MMOL/L (ref 24–28)
HCO3 UR-SCNC: 25.6 MMOL/L (ref 24–28)
HCO3 UR-SCNC: 26.1 MMOL/L (ref 24–28)
HCO3 UR-SCNC: 26.4 MMOL/L (ref 24–28)
HCO3 UR-SCNC: 26.4 MMOL/L (ref 24–28)
HCO3 UR-SCNC: 27 MMOL/L (ref 24–28)
HCT VFR BLD AUTO: 28.7 % (ref 37–48.5)
HCT VFR BLD AUTO: 29.6 % (ref 37–48.5)
HCT VFR BLD AUTO: 29.6 % (ref 37–48.5)
HCT VFR BLD AUTO: 38.7 % (ref 37–48.5)
HCT VFR BLD CALC: 26 %PCV (ref 36–54)
HCT VFR BLD CALC: 27 %PCV (ref 36–54)
HCT VFR BLD CALC: 28 %PCV (ref 36–54)
HCT VFR BLD CALC: 29 %PCV (ref 36–54)
HCT VFR BLD CALC: 30 %PCV (ref 36–54)
HCT VFR BLD CALC: 30 %PCV (ref 36–54)
HCT VFR BLD CALC: 34 %PCV (ref 36–54)
HGB BLD-MCNC: 12.2 G/DL (ref 12–16)
HGB BLD-MCNC: 8.9 G/DL (ref 12–16)
HGB BLD-MCNC: 9.2 G/DL (ref 12–16)
HGB BLD-MCNC: 9.3 G/DL (ref 12–16)
HLA DQA1 1: NORMAL
HLA DQA1 2: NORMAL
HLA DRB4 1: NORMAL
HLA-A 1 SERO. EQUIV: 1
HLA-A 1: NORMAL
HLA-A 2 SERO. EQUIV: 32
HLA-A 2: NORMAL
HLA-B 1 SERO. EQUIV: 8
HLA-B 1: NORMAL
HLA-B 2 SERO. EQUIV: 41
HLA-B 2: NORMAL
HLA-BW 1 SERO. EQUIV: 6
HLA-BW 2 SERO. EQUIV: NORMAL
HLA-C 1: NORMAL
HLA-C 2: NORMAL
HLA-CW 1 SERO. EQUIV: 7
HLA-CW 2 SERO. EQUIV: 17
HLA-DP 1 SERO. EQUIV: NORMAL
HLA-DP 2 SERO. EQUIV: NORMAL
HLA-DPA1 1: NORMAL
HLA-DPA1 2: NORMAL
HLA-DPB1 1: NORMAL
HLA-DPB1 2: NORMAL
HLA-DQ 1 SERO. EQUIV: 2
HLA-DQ 2 SERO. EQUIV: NORMAL
HLA-DQB1 1: NORMAL
HLA-DQB1 2: NORMAL
HLA-DRB1 1 SERO. EQUIV: 17
HLA-DRB1 1: NORMAL
HLA-DRB1 2 SERO. EQUIV: 7
HLA-DRB1 2: NORMAL
HLA-DRB3 1: NORMAL
HLA-DRB3 2: NORMAL
HLA-DRB345 1 SERO. EQUIV: 52
HLA-DRB345 2 SERO. EQUIV: 53
HLA-DRB4 2: NORMAL
HLA-DRB5 1: NORMAL
HLA-DRB5 2: NORMAL
HSV1 IGG SERPL QL IA: POSITIVE
HSV2 IGG SERPL QL IA: NEGATIVE
HYPOCHROMIA BLD QL SMEAR: ABNORMAL
IMM GRANULOCYTES # BLD AUTO: 0.04 K/UL (ref 0–0.04)
IMM GRANULOCYTES # BLD AUTO: 0.06 K/UL (ref 0–0.04)
IMM GRANULOCYTES # BLD AUTO: 0.07 K/UL (ref 0–0.04)
IMM GRANULOCYTES # BLD AUTO: 0.14 K/UL (ref 0–0.04)
IMM GRANULOCYTES NFR BLD AUTO: 0.6 % (ref 0–0.5)
IMM GRANULOCYTES NFR BLD AUTO: 0.8 % (ref 0–0.5)
INR PPP: 1 (ref 0.8–1.2)
INR PPP: 1.1 (ref 0.8–1.2)
INR PPP: 1.1 (ref 0.8–1.2)
INR PPP: 1.8 (ref 0.8–1.2)
LDH SERPL L TO P-CCNC: 1.04 MMOL/L (ref 0.36–1.25)
LDH SERPL L TO P-CCNC: 1.76 MMOL/L (ref 0.36–1.25)
LDH SERPL L TO P-CCNC: 1.88 MMOL/L (ref 0.36–1.25)
LDH SERPL L TO P-CCNC: 2.32 MMOL/L (ref 0.36–1.25)
LDH SERPL L TO P-CCNC: 2.71 MMOL/L (ref 0.36–1.25)
LDNA1 TESTING DATE: NORMAL
LDNA2 TESTING DATE: NORMAL
LYMPHOCYTES # BLD AUTO: 0.5 K/UL (ref 1–4.8)
LYMPHOCYTES # BLD AUTO: 0.6 K/UL (ref 1–4.8)
LYMPHOCYTES # BLD AUTO: 1.1 K/UL (ref 1–4.8)
LYMPHOCYTES # BLD AUTO: 1.3 K/UL (ref 1–4.8)
LYMPHOCYTES NFR BLD: 18.5 % (ref 18–48)
LYMPHOCYTES NFR BLD: 4.4 % (ref 18–48)
LYMPHOCYTES NFR BLD: 4.9 % (ref 18–48)
LYMPHOCYTES NFR BLD: 5.8 % (ref 18–48)
MAGNESIUM SERPL-MCNC: 1.9 MG/DL (ref 1.6–2.6)
MAGNESIUM SERPL-MCNC: 2 MG/DL (ref 1.6–2.6)
MAGNESIUM SERPL-MCNC: 2.1 MG/DL (ref 1.6–2.6)
MAGNESIUM SERPL-MCNC: 2.1 MG/DL (ref 1.6–2.6)
MCH RBC QN AUTO: 28.8 PG (ref 27–31)
MCH RBC QN AUTO: 28.8 PG (ref 27–31)
MCH RBC QN AUTO: 29.1 PG (ref 27–31)
MCH RBC QN AUTO: 29.3 PG (ref 27–31)
MCHC RBC AUTO-ENTMCNC: 31 G/DL (ref 32–36)
MCHC RBC AUTO-ENTMCNC: 31.1 G/DL (ref 32–36)
MCHC RBC AUTO-ENTMCNC: 31.4 G/DL (ref 32–36)
MCHC RBC AUTO-ENTMCNC: 31.5 G/DL (ref 32–36)
MCV RBC AUTO: 91 FL (ref 82–98)
MCV RBC AUTO: 93 FL (ref 82–98)
MCV RBC AUTO: 93 FL (ref 82–98)
MCV RBC AUTO: 94 FL (ref 82–98)
MIN VOL: 6
MIN VOL: 9
MODE: ABNORMAL
MODE: NORMAL
MONOCYTES # BLD AUTO: 0.4 K/UL (ref 0.3–1)
MONOCYTES # BLD AUTO: 0.5 K/UL (ref 0.3–1)
MONOCYTES # BLD AUTO: 0.6 K/UL (ref 0.3–1)
MONOCYTES # BLD AUTO: 0.7 K/UL (ref 0.3–1)
MONOCYTES NFR BLD: 2.1 % (ref 4–15)
MONOCYTES NFR BLD: 5.7 % (ref 4–15)
MONOCYTES NFR BLD: 5.8 % (ref 4–15)
MONOCYTES NFR BLD: 7.5 % (ref 4–15)
NEUTROPHILS # BLD AUTO: 11 K/UL (ref 1.8–7.7)
NEUTROPHILS # BLD AUTO: 16.6 K/UL (ref 1.8–7.7)
NEUTROPHILS # BLD AUTO: 4.6 K/UL (ref 1.8–7.7)
NEUTROPHILS # BLD AUTO: 9.3 K/UL (ref 1.8–7.7)
NEUTROPHILS NFR BLD: 67.8 % (ref 38–73)
NEUTROPHILS NFR BLD: 88.4 % (ref 38–73)
NEUTROPHILS NFR BLD: 88.6 % (ref 38–73)
NEUTROPHILS NFR BLD: 89.7 % (ref 38–73)
NICOTINE SERPL-MCNC: <3 NG/ML
NRBC BLD-RTO: 0 /100 WBC
NUM UNITS TRANS FFP: NORMAL
OVALOCYTES BLD QL SMEAR: ABNORMAL
PCO2 BLDA: 35.3 MMHG (ref 35–45)
PCO2 BLDA: 37.8 MMHG (ref 35–45)
PCO2 BLDA: 39.2 MMHG (ref 35–45)
PCO2 BLDA: 40.9 MMHG (ref 35–45)
PCO2 BLDA: 41.3 MMHG (ref 35–45)
PCO2 BLDA: 42.8 MMHG (ref 35–45)
PCO2 BLDA: 44.2 MMHG (ref 35–45)
PCO2 BLDA: 46.6 MMHG (ref 35–45)
PCO2 BLDA: 47 MMHG (ref 35–45)
PCO2 BLDA: 48 MMHG (ref 35–45)
PCO2 BLDA: 50.1 MMHG (ref 35–45)
PCO2 BLDA: 51.5 MMHG (ref 35–45)
PEEP: 5
PH SMN: 7.28 [PH] (ref 7.35–7.45)
PH SMN: 7.31 [PH] (ref 7.35–7.45)
PH SMN: 7.33 [PH] (ref 7.35–7.45)
PH SMN: 7.33 [PH] (ref 7.35–7.45)
PH SMN: 7.34 [PH] (ref 7.35–7.45)
PH SMN: 7.36 [PH] (ref 7.35–7.45)
PH SMN: 7.36 [PH] (ref 7.35–7.45)
PH SMN: 7.39 [PH] (ref 7.35–7.45)
PH SMN: 7.41 [PH] (ref 7.35–7.45)
PH SMN: 7.45 [PH] (ref 7.35–7.45)
PHOSPHATE SERPL-MCNC: 3.5 MG/DL (ref 2.7–4.5)
PHOSPHATE SERPL-MCNC: 3.8 MG/DL (ref 2.7–4.5)
PHOSPHATE SERPL-MCNC: 3.8 MG/DL (ref 2.7–4.5)
PHOSPHATE SERPL-MCNC: 4.4 MG/DL (ref 2.7–4.5)
PIP: 11
PIP: 20
PIP: 23
PLATELET # BLD AUTO: 130 K/UL (ref 150–350)
PLATELET # BLD AUTO: 135 K/UL (ref 150–350)
PLATELET # BLD AUTO: 163 K/UL (ref 150–350)
PLATELET # BLD AUTO: 281 K/UL (ref 150–350)
PLATELET BLD QL SMEAR: ABNORMAL
PMV BLD AUTO: 10 FL (ref 9.2–12.9)
PMV BLD AUTO: 9.6 FL (ref 9.2–12.9)
PMV BLD AUTO: 9.7 FL (ref 9.2–12.9)
PMV BLD AUTO: 9.7 FL (ref 9.2–12.9)
PO2 BLDA: 100 MMHG (ref 80–100)
PO2 BLDA: 119 MMHG (ref 80–100)
PO2 BLDA: 163 MMHG (ref 80–100)
PO2 BLDA: 165 MMHG (ref 80–100)
PO2 BLDA: 166 MMHG (ref 80–100)
PO2 BLDA: 218 MMHG (ref 80–100)
PO2 BLDA: 220 MMHG (ref 80–100)
PO2 BLDA: 258 MMHG (ref 80–100)
PO2 BLDA: 314 MMHG (ref 80–100)
PO2 BLDA: 335 MMHG (ref 80–100)
PO2 BLDA: 43 MMHG (ref 40–60)
PO2 BLDA: 43 MMHG (ref 40–60)
POC BE: -1 MMOL/L
POC BE: -1 MMOL/L
POC BE: -10 MMOL/L
POC BE: -2 MMOL/L
POC BE: -4 MMOL/L
POC BE: 0 MMOL/L
POC BE: 1 MMOL/L
POC BE: 3 MMOL/L
POC IONIZED CALCIUM: 1.08 MMOL/L (ref 1.06–1.42)
POC IONIZED CALCIUM: 1.1 MMOL/L (ref 1.06–1.42)
POC IONIZED CALCIUM: 1.11 MMOL/L (ref 1.06–1.42)
POC IONIZED CALCIUM: 1.14 MMOL/L (ref 1.06–1.42)
POC IONIZED CALCIUM: 1.15 MMOL/L (ref 1.06–1.42)
POC IONIZED CALCIUM: 1.18 MMOL/L (ref 1.06–1.42)
POC IONIZED CALCIUM: 1.31 MMOL/L (ref 1.06–1.42)
POC SATURATED O2: 100 % (ref 95–100)
POC SATURATED O2: 73 % (ref 95–100)
POC SATURATED O2: 78 % (ref 95–100)
POC SATURATED O2: 98 % (ref 95–100)
POC SATURATED O2: 98 % (ref 95–100)
POC SATURATED O2: 99 % (ref 95–100)
POC TCO2: 18 MMOL/L (ref 24–29)
POC TCO2: 23 MMOL/L (ref 23–27)
POC TCO2: 25 MMOL/L (ref 23–27)
POC TCO2: 26 MMOL/L (ref 24–29)
POC TCO2: 27 MMOL/L (ref 23–27)
POC TCO2: 27 MMOL/L (ref 23–27)
POC TCO2: 28 MMOL/L (ref 23–27)
POCT GLUCOSE: 108 MG/DL (ref 70–110)
POCT GLUCOSE: 117 MG/DL (ref 70–110)
POCT GLUCOSE: 123 MG/DL (ref 70–110)
POCT GLUCOSE: 128 MG/DL (ref 70–110)
POCT GLUCOSE: 131 MG/DL (ref 70–110)
POCT GLUCOSE: 134 MG/DL (ref 70–110)
POCT GLUCOSE: 136 MG/DL (ref 70–110)
POCT GLUCOSE: 163 MG/DL (ref 70–110)
POCT GLUCOSE: 171 MG/DL (ref 70–110)
POCT GLUCOSE: 174 MG/DL (ref 70–110)
POIKILOCYTOSIS BLD QL SMEAR: SLIGHT
POLYCHROMASIA BLD QL SMEAR: ABNORMAL
POTASSIUM BLD-SCNC: 3.2 MMOL/L (ref 3.5–5.1)
POTASSIUM BLD-SCNC: 3.4 MMOL/L (ref 3.5–5.1)
POTASSIUM BLD-SCNC: 3.4 MMOL/L (ref 3.5–5.1)
POTASSIUM BLD-SCNC: 3.5 MMOL/L (ref 3.5–5.1)
POTASSIUM BLD-SCNC: 3.5 MMOL/L (ref 3.5–5.1)
POTASSIUM BLD-SCNC: 3.7 MMOL/L (ref 3.5–5.1)
POTASSIUM BLD-SCNC: 4.6 MMOL/L (ref 3.5–5.1)
POTASSIUM SERPL-SCNC: 3.6 MMOL/L (ref 3.5–5.1)
POTASSIUM SERPL-SCNC: 3.8 MMOL/L (ref 3.5–5.1)
POTASSIUM SERPL-SCNC: 4.3 MMOL/L (ref 3.5–5.1)
POTASSIUM SERPL-SCNC: 4.5 MMOL/L (ref 3.5–5.1)
PROT SERPL-MCNC: 7 G/DL (ref 6–8.4)
PROTHROMBIN TIME: 10.5 SEC (ref 9–12.5)
PROTHROMBIN TIME: 10.8 SEC (ref 9–12.5)
PROTHROMBIN TIME: 11.6 SEC (ref 9–12.5)
PROTHROMBIN TIME: 17.7 SEC (ref 9–12.5)
RBC # BLD AUTO: 3.06 M/UL (ref 4–5.4)
RBC # BLD AUTO: 3.17 M/UL (ref 4–5.4)
RBC # BLD AUTO: 3.2 M/UL (ref 4–5.4)
RBC # BLD AUTO: 4.24 M/UL (ref 4–5.4)
SAMPLE: ABNORMAL
SAMPLE: NORMAL
SERUM COLLECTION DT - LUMINEX CLASS I: NORMAL
SERUM COLLECTION DT - LUMINEX CLASS II: NORMAL
SITE: ABNORMAL
SITE: NORMAL
SODIUM BLD-SCNC: 134 MMOL/L (ref 136–145)
SODIUM BLD-SCNC: 135 MMOL/L (ref 136–145)
SODIUM BLD-SCNC: 136 MMOL/L (ref 136–145)
SODIUM BLD-SCNC: 136 MMOL/L (ref 136–145)
SODIUM BLD-SCNC: 137 MMOL/L (ref 136–145)
SODIUM BLD-SCNC: 139 MMOL/L (ref 136–145)
SODIUM BLD-SCNC: 140 MMOL/L (ref 136–145)
SODIUM SERPL-SCNC: 137 MMOL/L (ref 136–145)
SODIUM SERPL-SCNC: 137 MMOL/L (ref 136–145)
SODIUM SERPL-SCNC: 139 MMOL/L (ref 136–145)
SODIUM SERPL-SCNC: 140 MMOL/L (ref 136–145)
SP02: 100
SP02: 91
SPCL1 TESTING DATE: NORMAL
SPCL2 TESTING DATE: NORMAL
SPLUA TESTING DATE: NORMAL
STRONGYLOIDES ANTIBODY IGG: NEGATIVE
UNIT NUMBER: NORMAL
VT: 350
WBC # BLD AUTO: 10.48 K/UL (ref 3.9–12.7)
WBC # BLD AUTO: 12.37 K/UL (ref 3.9–12.7)
WBC # BLD AUTO: 18.42 K/UL (ref 3.9–12.7)
WBC # BLD AUTO: 6.77 K/UL (ref 3.9–12.7)

## 2019-09-10 PROCEDURE — 93312 ECHO TRANSESOPHAGEAL: CPT | Mod: 26,59,NTX, | Performed by: ANESTHESIOLOGY

## 2019-09-10 PROCEDURE — 84132 ASSAY OF SERUM POTASSIUM: CPT | Mod: NTX

## 2019-09-10 PROCEDURE — 85014 HEMATOCRIT: CPT | Mod: NTX

## 2019-09-10 PROCEDURE — 27000191 HC C-V MONITORING: Mod: NTX

## 2019-09-10 PROCEDURE — 63600367 HC NITRIC OXIDE PER HOUR: Mod: NTX

## 2019-09-10 PROCEDURE — 84100 ASSAY OF PHOSPHORUS: CPT | Mod: 91,NTX

## 2019-09-10 PROCEDURE — 36000713 HC OR TIME LEV V EA ADD 15 MIN: Mod: NTX | Performed by: THORACIC SURGERY (CARDIOTHORACIC VASCULAR SURGERY)

## 2019-09-10 PROCEDURE — 84295 ASSAY OF SERUM SODIUM: CPT

## 2019-09-10 PROCEDURE — 33979 INSERT INTRACORPOREAL DEVICE: CPT | Mod: NTX,,, | Performed by: THORACIC SURGERY (CARDIOTHORACIC VASCULAR SURGERY)

## 2019-09-10 PROCEDURE — 86480 TB TEST CELL IMMUN MEASURE: CPT | Mod: NTX

## 2019-09-10 PROCEDURE — 63600175 PHARM REV CODE 636 W HCPCS: Mod: NTX | Performed by: THORACIC SURGERY (CARDIOTHORACIC VASCULAR SURGERY)

## 2019-09-10 PROCEDURE — 80053 COMPREHEN METABOLIC PANEL: CPT | Mod: NTX

## 2019-09-10 PROCEDURE — 94002 VENT MGMT INPAT INIT DAY: CPT | Mod: NTX

## 2019-09-10 PROCEDURE — 84100 ASSAY OF PHOSPHORUS: CPT | Mod: NTX

## 2019-09-10 PROCEDURE — 27201041 HC RESERVOIR, CARDIOTOMY: Mod: NTX

## 2019-09-10 PROCEDURE — 63600175 PHARM REV CODE 636 W HCPCS: Mod: NTX | Performed by: STUDENT IN AN ORGANIZED HEALTH CARE EDUCATION/TRAINING PROGRAM

## 2019-09-10 PROCEDURE — 85384 FIBRINOGEN ACTIVITY: CPT | Mod: NTX

## 2019-09-10 PROCEDURE — 85730 THROMBOPLASTIN TIME PARTIAL: CPT | Mod: 91,NTX

## 2019-09-10 PROCEDURE — P9017 PLASMA 1 DONOR FRZ W/IN 8 HR: HCPCS | Mod: NTX

## 2019-09-10 PROCEDURE — 27201423 OPTIME MED/SURG SUP & DEVICES STERILE SUPPLY: Mod: NTX | Performed by: THORACIC SURGERY (CARDIOTHORACIC VASCULAR SURGERY)

## 2019-09-10 PROCEDURE — 99223 PR INITIAL HOSPITAL CARE,LEVL III: ICD-10-PCS | Mod: NTX,,, | Performed by: NURSE PRACTITIONER

## 2019-09-10 PROCEDURE — 63600175 PHARM REV CODE 636 W HCPCS: Mod: NTX

## 2019-09-10 PROCEDURE — 88305 TISSUE EXAM BY PATHOLOGIST: CPT | Mod: NTX | Performed by: PATHOLOGY

## 2019-09-10 PROCEDURE — 88305 TISSUE SPECIMEN TO PATHOLOGY - SURGERY: ICD-10-PCS | Mod: 26,NTX,, | Performed by: PATHOLOGY

## 2019-09-10 PROCEDURE — 25000003 PHARM REV CODE 250: Mod: NTX | Performed by: NURSE PRACTITIONER

## 2019-09-10 PROCEDURE — 25000003 PHARM REV CODE 250: Mod: NTX | Performed by: THORACIC SURGERY (CARDIOTHORACIC VASCULAR SURGERY)

## 2019-09-10 PROCEDURE — 36620 ARTERIAL: ICD-10-PCS | Mod: 59,NTX,, | Performed by: ANESTHESIOLOGY

## 2019-09-10 PROCEDURE — 85610 PROTHROMBIN TIME: CPT | Mod: 91,NTX

## 2019-09-10 PROCEDURE — 27801475 HC HEARTMATE III IMPLANT KIT: Mod: NTX

## 2019-09-10 PROCEDURE — 63600175 PHARM REV CODE 636 W HCPCS: Mod: NTX | Performed by: NURSE PRACTITIONER

## 2019-09-10 PROCEDURE — 93750 PR INTERROGATE VENT ASSIST DEV, IN PERSON, W PHYSICIAN ANALYSIS: ICD-10-PCS | Mod: NTX,,, | Performed by: INTERNAL MEDICINE

## 2019-09-10 PROCEDURE — 93750 INTERROGATION VAD IN PERSON: CPT | Mod: NTX,,, | Performed by: INTERNAL MEDICINE

## 2019-09-10 PROCEDURE — 94010 BREATHING CAPACITY TEST: CPT | Mod: NTX

## 2019-09-10 PROCEDURE — 37799 UNLISTED PX VASCULAR SURGERY: CPT | Mod: NTX

## 2019-09-10 PROCEDURE — 94761 N-INVAS EAR/PLS OXIMETRY MLT: CPT | Mod: NTX

## 2019-09-10 PROCEDURE — 20000000 HC ICU ROOM: Mod: NTX

## 2019-09-10 PROCEDURE — P9045 ALBUMIN (HUMAN), 5%, 250 ML: HCPCS | Mod: JG,NTX | Performed by: STUDENT IN AN ORGANIZED HEALTH CARE EDUCATION/TRAINING PROGRAM

## 2019-09-10 PROCEDURE — P9012 CRYOPRECIPITATE EACH UNIT: HCPCS | Mod: NTX

## 2019-09-10 PROCEDURE — 37000009 HC ANESTHESIA EA ADD 15 MINS: Mod: NTX | Performed by: THORACIC SURGERY (CARDIOTHORACIC VASCULAR SURGERY)

## 2019-09-10 PROCEDURE — 85610 PROTHROMBIN TIME: CPT | Mod: NTX

## 2019-09-10 PROCEDURE — 80048 BASIC METABOLIC PNL TOTAL CA: CPT | Mod: 91,NTX

## 2019-09-10 PROCEDURE — 82330 ASSAY OF CALCIUM: CPT | Mod: NTX

## 2019-09-10 PROCEDURE — 36620 INSERTION CATHETER ARTERY: CPT | Mod: 59,NTX,, | Performed by: ANESTHESIOLOGY

## 2019-09-10 PROCEDURE — 99223 1ST HOSP IP/OBS HIGH 75: CPT | Mod: NTX,,, | Performed by: NURSE PRACTITIONER

## 2019-09-10 PROCEDURE — 84295 ASSAY OF SERUM SODIUM: CPT | Mod: NTX

## 2019-09-10 PROCEDURE — 83605 ASSAY OF LACTIC ACID: CPT | Mod: NTX

## 2019-09-10 PROCEDURE — 99223 PR INITIAL HOSPITAL CARE,LEVL III: ICD-10-PCS | Mod: NTX,,, | Performed by: SURGERY

## 2019-09-10 PROCEDURE — 27201037 HC PRESSURE MONITORING SET UP: Mod: NTX

## 2019-09-10 PROCEDURE — 85025 COMPLETE CBC W/AUTO DIFF WBC: CPT | Mod: 91,NTX

## 2019-09-10 PROCEDURE — 82803 BLOOD GASES ANY COMBINATION: CPT | Mod: NTX

## 2019-09-10 PROCEDURE — 99223 1ST HOSP IP/OBS HIGH 75: CPT | Mod: NTX,,, | Performed by: SURGERY

## 2019-09-10 PROCEDURE — D9220A PRA ANESTHESIA: Mod: NTX,,, | Performed by: ANESTHESIOLOGY

## 2019-09-10 PROCEDURE — 83735 ASSAY OF MAGNESIUM: CPT | Mod: 91,NTX

## 2019-09-10 PROCEDURE — 33979 PR INSERT VENT ASST DEV,IMPLANT,SINGLE VENT: ICD-10-PCS | Mod: NTX,,, | Performed by: THORACIC SURGERY (CARDIOTHORACIC VASCULAR SURGERY)

## 2019-09-10 PROCEDURE — 27000221 HC OXYGEN, UP TO 24 HOURS: Mod: NTX

## 2019-09-10 PROCEDURE — C9113 INJ PANTOPRAZOLE SODIUM, VIA: HCPCS | Mod: NTX | Performed by: STUDENT IN AN ORGANIZED HEALTH CARE EDUCATION/TRAINING PROGRAM

## 2019-09-10 PROCEDURE — 36600 WITHDRAWAL OF ARTERIAL BLOOD: CPT | Mod: NTX

## 2019-09-10 PROCEDURE — 36000712 HC OR TIME LEV V 1ST 15 MIN: Mod: NTX | Performed by: THORACIC SURGERY (CARDIOTHORACIC VASCULAR SURGERY)

## 2019-09-10 PROCEDURE — 36592 COLLECT BLOOD FROM PICC: CPT | Mod: NTX

## 2019-09-10 PROCEDURE — 27000175 HC ADULT BYPASS PUMP: Mod: NTX

## 2019-09-10 PROCEDURE — C1768 GRAFT, VASCULAR: HCPCS | Mod: NTX | Performed by: THORACIC SURGERY (CARDIOTHORACIC VASCULAR SURGERY)

## 2019-09-10 PROCEDURE — 93503 SWAN GANZ LINE: ICD-10-PCS | Mod: 59,NTX,, | Performed by: ANESTHESIOLOGY

## 2019-09-10 PROCEDURE — C1729 CATH, DRAINAGE: HCPCS | Mod: NTX | Performed by: THORACIC SURGERY (CARDIOTHORACIC VASCULAR SURGERY)

## 2019-09-10 PROCEDURE — 37000008 HC ANESTHESIA 1ST 15 MINUTES: Mod: NTX | Performed by: THORACIC SURGERY (CARDIOTHORACIC VASCULAR SURGERY)

## 2019-09-10 PROCEDURE — 86965 POOLING BLOOD PLATELETS: CPT | Mod: NTX

## 2019-09-10 PROCEDURE — D9220A PRA ANESTHESIA: ICD-10-PCS | Mod: NTX,,, | Performed by: ANESTHESIOLOGY

## 2019-09-10 PROCEDURE — 83735 ASSAY OF MAGNESIUM: CPT | Mod: NTX

## 2019-09-10 PROCEDURE — 93503 INSERT/PLACE HEART CATHETER: CPT | Mod: 59,NTX,, | Performed by: ANESTHESIOLOGY

## 2019-09-10 PROCEDURE — 25000003 PHARM REV CODE 250: Mod: NTX | Performed by: STUDENT IN AN ORGANIZED HEALTH CARE EDUCATION/TRAINING PROGRAM

## 2019-09-10 PROCEDURE — 85520 HEPARIN ASSAY: CPT | Mod: NTX

## 2019-09-10 PROCEDURE — 94150 VITAL CAPACITY TEST: CPT | Mod: NTX

## 2019-09-10 PROCEDURE — 93312 PR ECHO HEART,TRANSESOPHAGEAL: ICD-10-PCS | Mod: 26,59,NTX, | Performed by: ANESTHESIOLOGY

## 2019-09-10 PROCEDURE — 82565 ASSAY OF CREATININE: CPT | Mod: NTX

## 2019-09-10 PROCEDURE — 99232 PR SUBSEQUENT HOSPITAL CARE,LEVL II: ICD-10-PCS | Mod: NTX,,, | Performed by: INTERNAL MEDICINE

## 2019-09-10 PROCEDURE — 99900035 HC TECH TIME PER 15 MIN (STAT): Mod: NTX

## 2019-09-10 PROCEDURE — 99232 SBSQ HOSP IP/OBS MODERATE 35: CPT | Mod: NTX,,, | Performed by: INTERNAL MEDICINE

## 2019-09-10 DEVICE — MEMBRANE PERICARDIAL 15X20CM
Type: IMPLANTABLE DEVICE | Site: HEART | Status: NON-FUNCTIONAL
Removed: 2019-12-16

## 2019-09-10 DEVICE — FELT TEFLON 1INX6IN
Type: IMPLANTABLE DEVICE | Site: HEART | Status: NON-FUNCTIONAL
Removed: 2019-12-16

## 2019-09-10 RX ORDER — NICARDIPINE HYDROCHLORIDE 0.2 MG/ML
INJECTION INTRAVENOUS CONTINUOUS PRN
Status: DISCONTINUED | OUTPATIENT
Start: 2019-09-10 | End: 2019-09-10

## 2019-09-10 RX ORDER — ETOMIDATE 2 MG/ML
INJECTION INTRAVENOUS
Status: DISCONTINUED | OUTPATIENT
Start: 2019-09-10 | End: 2019-09-10

## 2019-09-10 RX ORDER — FUROSEMIDE 10 MG/ML
INJECTION INTRAMUSCULAR; INTRAVENOUS
Status: DISCONTINUED | OUTPATIENT
Start: 2019-09-10 | End: 2019-09-10

## 2019-09-10 RX ORDER — FLUCONAZOLE 2 MG/ML
INJECTION, SOLUTION INTRAVENOUS CONTINUOUS PRN
Status: COMPLETED | OUTPATIENT
Start: 2019-09-10 | End: 2019-09-10

## 2019-09-10 RX ORDER — MUPIROCIN 20 MG/G
OINTMENT TOPICAL 2 TIMES DAILY
Status: DISCONTINUED | OUTPATIENT
Start: 2019-09-10 | End: 2019-09-10 | Stop reason: HOSPADM

## 2019-09-10 RX ORDER — FLUCONAZOLE 2 MG/ML
400 INJECTION, SOLUTION INTRAVENOUS
Status: CANCELLED | OUTPATIENT
Start: 2019-09-10

## 2019-09-10 RX ORDER — TRANEXAMIC ACID 100 MG/ML
INJECTION, SOLUTION INTRAVENOUS
Status: DISCONTINUED | OUTPATIENT
Start: 2019-09-10 | End: 2019-09-10

## 2019-09-10 RX ORDER — NICARDIPINE HYDROCHLORIDE 0.2 MG/ML
2.5 INJECTION INTRAVENOUS CONTINUOUS
Status: DISCONTINUED | OUTPATIENT
Start: 2019-09-10 | End: 2019-09-13

## 2019-09-10 RX ORDER — HYDROMORPHONE HCL IN 0.9% NACL 6 MG/30 ML
PATIENT CONTROLLED ANALGESIA SYRINGE INTRAVENOUS CONTINUOUS
Status: DISCONTINUED | OUTPATIENT
Start: 2019-09-10 | End: 2019-09-13

## 2019-09-10 RX ORDER — PROPOFOL 10 MG/ML
VIAL (ML) INTRAVENOUS
Status: DISCONTINUED | OUTPATIENT
Start: 2019-09-10 | End: 2019-09-10

## 2019-09-10 RX ORDER — CEFEPIME HYDROCHLORIDE 2 G/1
2 INJECTION, POWDER, FOR SOLUTION INTRAVENOUS
Status: DISCONTINUED | OUTPATIENT
Start: 2019-09-11 | End: 2019-09-11

## 2019-09-10 RX ORDER — SODIUM CHLORIDE 0.9 % (FLUSH) 0.9 %
10 SYRINGE (ML) INJECTION
Status: CANCELLED | OUTPATIENT
Start: 2019-09-10

## 2019-09-10 RX ORDER — MUPIROCIN 20 MG/G
OINTMENT TOPICAL 2 TIMES DAILY
Status: CANCELLED | OUTPATIENT
Start: 2019-09-10 | End: 2019-09-15

## 2019-09-10 RX ORDER — FENTANYL CITRATE 50 UG/ML
INJECTION, SOLUTION INTRAMUSCULAR; INTRAVENOUS
Status: DISCONTINUED | OUTPATIENT
Start: 2019-09-10 | End: 2019-09-10

## 2019-09-10 RX ORDER — SODIUM CHLORIDE 0.9 % (FLUSH) 0.9 %
10 SYRINGE (ML) INJECTION
Status: DISCONTINUED | OUTPATIENT
Start: 2019-09-10 | End: 2019-10-01 | Stop reason: HOSPADM

## 2019-09-10 RX ORDER — BISACODYL 10 MG
10 SUPPOSITORY, RECTAL RECTAL DAILY PRN
Status: CANCELLED | OUTPATIENT
Start: 2019-09-10

## 2019-09-10 RX ORDER — MAGNESIUM SULFATE HEPTAHYDRATE 40 MG/ML
2 INJECTION, SOLUTION INTRAVENOUS
Status: DISCONTINUED | OUTPATIENT
Start: 2019-09-10 | End: 2019-09-24

## 2019-09-10 RX ORDER — DOCUSATE SODIUM 100 MG/1
200 CAPSULE, LIQUID FILLED ORAL NIGHTLY
Status: CANCELLED | OUTPATIENT
Start: 2019-09-10

## 2019-09-10 RX ORDER — PROPOFOL 10 MG/ML
15 INJECTION, EMULSION INTRAVENOUS CONTINUOUS
Status: DISCONTINUED | OUTPATIENT
Start: 2019-09-10 | End: 2019-09-11

## 2019-09-10 RX ORDER — ALBUMIN HUMAN 50 G/1000ML
500 SOLUTION INTRAVENOUS
Status: DISCONTINUED | OUTPATIENT
Start: 2019-09-10 | End: 2019-09-17

## 2019-09-10 RX ORDER — NALOXONE HCL 0.4 MG/ML
0.02 VIAL (ML) INJECTION
Status: DISCONTINUED | OUTPATIENT
Start: 2019-09-10 | End: 2019-09-13

## 2019-09-10 RX ORDER — ALBUMIN HUMAN 50 G/1000ML
25 SOLUTION INTRAVENOUS ONCE
Status: COMPLETED | OUTPATIENT
Start: 2019-09-10 | End: 2019-09-10

## 2019-09-10 RX ORDER — ALBUTEROL SULFATE 2.5 MG/.5ML
2.5 SOLUTION RESPIRATORY (INHALATION) EVERY 4 HOURS PRN
Status: CANCELLED | OUTPATIENT
Start: 2019-09-10

## 2019-09-10 RX ORDER — ALBUTEROL SULFATE 2.5 MG/.5ML
2.5 SOLUTION RESPIRATORY (INHALATION) EVERY 4 HOURS PRN
Status: DISCONTINUED | OUTPATIENT
Start: 2019-09-10 | End: 2019-10-01 | Stop reason: HOSPADM

## 2019-09-10 RX ORDER — RIFAMPIN 600 MG/10ML
INJECTION, POWDER, LYOPHILIZED, FOR SOLUTION INTRAVENOUS
Status: DISCONTINUED | OUTPATIENT
Start: 2019-09-10 | End: 2019-09-10 | Stop reason: HOSPADM

## 2019-09-10 RX ORDER — DEXTROSE MONOHYDRATE, SODIUM CHLORIDE, AND POTASSIUM CHLORIDE 50; 2.98; 4.5 G/1000ML; G/1000ML; G/1000ML
INJECTION, SOLUTION INTRAVENOUS CONTINUOUS
Status: DISCONTINUED | OUTPATIENT
Start: 2019-09-10 | End: 2019-09-13

## 2019-09-10 RX ORDER — FLUCONAZOLE 2 MG/ML
400 INJECTION, SOLUTION INTRAVENOUS
Status: DISCONTINUED | OUTPATIENT
Start: 2019-09-10 | End: 2019-09-10

## 2019-09-10 RX ORDER — HEPARIN SODIUM 1000 [USP'U]/ML
INJECTION, SOLUTION INTRAVENOUS; SUBCUTANEOUS
Status: DISCONTINUED | OUTPATIENT
Start: 2019-09-10 | End: 2019-09-10

## 2019-09-10 RX ORDER — POTASSIUM CHLORIDE 14.9 MG/ML
40 INJECTION INTRAVENOUS
Status: DISCONTINUED | OUTPATIENT
Start: 2019-09-10 | End: 2019-09-13

## 2019-09-10 RX ORDER — POTASSIUM CHLORIDE 14.9 MG/ML
40 INJECTION INTRAVENOUS
Status: CANCELLED | OUTPATIENT
Start: 2019-09-10

## 2019-09-10 RX ORDER — DEXTROSE MONOHYDRATE, SODIUM CHLORIDE, AND POTASSIUM CHLORIDE 50; 2.98; 4.5 G/1000ML; G/1000ML; G/1000ML
INJECTION, SOLUTION INTRAVENOUS CONTINUOUS
Status: CANCELLED | OUTPATIENT
Start: 2019-09-10

## 2019-09-10 RX ORDER — ASPIRIN 325 MG
325 TABLET, DELAYED RELEASE (ENTERIC COATED) ORAL DAILY
Status: DISCONTINUED | OUTPATIENT
Start: 2019-09-11 | End: 2019-10-01 | Stop reason: HOSPADM

## 2019-09-10 RX ORDER — FENTANYL CITRATE 50 UG/ML
INJECTION, SOLUTION INTRAMUSCULAR; INTRAVENOUS
Status: DISPENSED
Start: 2019-09-10 | End: 2019-09-11

## 2019-09-10 RX ORDER — MIDAZOLAM HYDROCHLORIDE 1 MG/ML
INJECTION, SOLUTION INTRAMUSCULAR; INTRAVENOUS
Status: DISCONTINUED | OUTPATIENT
Start: 2019-09-10 | End: 2019-09-10

## 2019-09-10 RX ORDER — DOCUSATE SODIUM 100 MG/1
200 CAPSULE, LIQUID FILLED ORAL NIGHTLY
Status: DISCONTINUED | OUTPATIENT
Start: 2019-09-10 | End: 2019-09-13

## 2019-09-10 RX ORDER — FENTANYL CITRATE 50 UG/ML
25 INJECTION, SOLUTION INTRAMUSCULAR; INTRAVENOUS
Status: DISCONTINUED | OUTPATIENT
Start: 2019-09-10 | End: 2019-09-17

## 2019-09-10 RX ORDER — OXYCODONE HYDROCHLORIDE 5 MG/1
10 TABLET ORAL EVERY 4 HOURS PRN
Status: CANCELLED | OUTPATIENT
Start: 2019-09-10

## 2019-09-10 RX ORDER — MUPIROCIN 20 MG/G
OINTMENT TOPICAL 2 TIMES DAILY
Status: COMPLETED | OUTPATIENT
Start: 2019-09-10 | End: 2019-09-15

## 2019-09-10 RX ORDER — CEFEPIME HYDROCHLORIDE 2 G/1
2 INJECTION, POWDER, FOR SOLUTION INTRAVENOUS
Status: COMPLETED | OUTPATIENT
Start: 2019-09-10 | End: 2019-09-10

## 2019-09-10 RX ORDER — DOBUTAMINE HYDROCHLORIDE 200 MG/100ML
4 INJECTION INTRAVENOUS CONTINUOUS
Status: DISCONTINUED | OUTPATIENT
Start: 2019-09-10 | End: 2019-09-13

## 2019-09-10 RX ORDER — KETAMINE HCL IN 0.9 % NACL 50 MG/5 ML
SYRINGE (ML) INTRAVENOUS
Status: DISCONTINUED | OUTPATIENT
Start: 2019-09-10 | End: 2019-09-10

## 2019-09-10 RX ORDER — ONDANSETRON 2 MG/ML
INJECTION INTRAMUSCULAR; INTRAVENOUS
Status: COMPLETED
Start: 2019-09-10 | End: 2019-09-10

## 2019-09-10 RX ORDER — OXYCODONE HYDROCHLORIDE 5 MG/1
5 TABLET ORAL EVERY 4 HOURS PRN
Status: DISCONTINUED | OUTPATIENT
Start: 2019-09-10 | End: 2019-09-13

## 2019-09-10 RX ORDER — PROPOFOL 10 MG/ML
VIAL (ML) INTRAVENOUS CONTINUOUS PRN
Status: DISCONTINUED | OUTPATIENT
Start: 2019-09-10 | End: 2019-09-10

## 2019-09-10 RX ORDER — ROCURONIUM BROMIDE 10 MG/ML
INJECTION, SOLUTION INTRAVENOUS
Status: DISCONTINUED | OUTPATIENT
Start: 2019-09-10 | End: 2019-09-10

## 2019-09-10 RX ORDER — NITROGLYCERIN 5 MG/ML
INJECTION, SOLUTION INTRAVENOUS
Status: DISCONTINUED | OUTPATIENT
Start: 2019-09-10 | End: 2019-09-10

## 2019-09-10 RX ORDER — POTASSIUM CHLORIDE 7.45 MG/ML
INJECTION INTRAVENOUS
Status: DISCONTINUED | OUTPATIENT
Start: 2019-09-10 | End: 2019-09-10

## 2019-09-10 RX ORDER — FERROUS GLUCONATE 324(37.5)
324 TABLET ORAL
Status: DISCONTINUED | OUTPATIENT
Start: 2019-09-11 | End: 2019-09-10

## 2019-09-10 RX ORDER — PANTOPRAZOLE SODIUM 40 MG/10ML
40 INJECTION, POWDER, LYOPHILIZED, FOR SOLUTION INTRAVENOUS DAILY
Status: CANCELLED | OUTPATIENT
Start: 2019-09-10

## 2019-09-10 RX ORDER — PANTOPRAZOLE SODIUM 40 MG/1
40 TABLET, DELAYED RELEASE ORAL DAILY
Status: CANCELLED | OUTPATIENT
Start: 2019-09-10

## 2019-09-10 RX ORDER — ACETAMINOPHEN 10 MG/ML
INJECTION, SOLUTION INTRAVENOUS
Status: DISCONTINUED | OUTPATIENT
Start: 2019-09-10 | End: 2019-09-10

## 2019-09-10 RX ORDER — ALBUMIN HUMAN 50 G/1000ML
500 SOLUTION INTRAVENOUS
Status: CANCELLED | OUTPATIENT
Start: 2019-09-10

## 2019-09-10 RX ORDER — ADHESIVE BANDAGE
30 BANDAGE TOPICAL DAILY PRN
Status: CANCELLED | OUTPATIENT
Start: 2019-09-10

## 2019-09-10 RX ORDER — ASPIRIN 325 MG
325 TABLET ORAL DAILY
Status: DISCONTINUED | OUTPATIENT
Start: 2019-09-10 | End: 2019-09-11

## 2019-09-10 RX ORDER — PANTOPRAZOLE SODIUM 40 MG/10ML
40 INJECTION, POWDER, LYOPHILIZED, FOR SOLUTION INTRAVENOUS DAILY
Status: DISCONTINUED | OUTPATIENT
Start: 2019-09-10 | End: 2019-09-12

## 2019-09-10 RX ORDER — ASPIRIN 325 MG
325 TABLET ORAL DAILY
Status: CANCELLED | OUTPATIENT
Start: 2019-09-10

## 2019-09-10 RX ORDER — BISACODYL 10 MG
10 SUPPOSITORY, RECTAL RECTAL DAILY PRN
Status: DISCONTINUED | OUTPATIENT
Start: 2019-09-10 | End: 2019-10-01 | Stop reason: HOSPADM

## 2019-09-10 RX ORDER — FUROSEMIDE 10 MG/ML
10 INJECTION INTRAMUSCULAR; INTRAVENOUS ONCE
Status: COMPLETED | OUTPATIENT
Start: 2019-09-10 | End: 2019-09-10

## 2019-09-10 RX ORDER — TRANEXAMIC ACID 100 MG/ML
INJECTION, SOLUTION INTRAVENOUS CONTINUOUS PRN
Status: DISCONTINUED | OUTPATIENT
Start: 2019-09-10 | End: 2019-09-10

## 2019-09-10 RX ORDER — FENTANYL CITRATE 50 UG/ML
50 INJECTION, SOLUTION INTRAMUSCULAR; INTRAVENOUS ONCE
Status: COMPLETED | OUTPATIENT
Start: 2019-09-10 | End: 2019-09-10

## 2019-09-10 RX ORDER — OXYCODONE HYDROCHLORIDE 10 MG/1
10 TABLET ORAL EVERY 4 HOURS PRN
Status: DISCONTINUED | OUTPATIENT
Start: 2019-09-10 | End: 2019-09-13

## 2019-09-10 RX ORDER — FERROUS GLUCONATE 324(38)MG
324 TABLET ORAL
Status: CANCELLED | OUTPATIENT
Start: 2019-09-10

## 2019-09-10 RX ORDER — ONDANSETRON 2 MG/ML
INJECTION INTRAMUSCULAR; INTRAVENOUS
Status: DISCONTINUED | OUTPATIENT
Start: 2019-09-10 | End: 2019-09-10

## 2019-09-10 RX ORDER — LIDOCAINE HCL/PF 100 MG/5ML
SYRINGE (ML) INTRAVENOUS
Status: DISCONTINUED | OUTPATIENT
Start: 2019-09-10 | End: 2019-09-10

## 2019-09-10 RX ORDER — ASPIRIN 325 MG
325 TABLET, DELAYED RELEASE (ENTERIC COATED) ORAL DAILY
Status: CANCELLED | OUTPATIENT
Start: 2019-09-10

## 2019-09-10 RX ORDER — PANTOPRAZOLE SODIUM 40 MG/1
40 TABLET, DELAYED RELEASE ORAL DAILY
Status: DISCONTINUED | OUTPATIENT
Start: 2019-09-10 | End: 2019-09-10

## 2019-09-10 RX ORDER — HYDROMORPHONE HYDROCHLORIDE 1 MG/ML
0.5 INJECTION, SOLUTION INTRAMUSCULAR; INTRAVENOUS; SUBCUTANEOUS
Status: DISCONTINUED | OUTPATIENT
Start: 2019-09-10 | End: 2019-09-13

## 2019-09-10 RX ORDER — BACITRACIN 50000 [IU]/1
INJECTION, POWDER, FOR SOLUTION INTRAMUSCULAR
Status: DISCONTINUED | OUTPATIENT
Start: 2019-09-10 | End: 2019-09-10 | Stop reason: HOSPADM

## 2019-09-10 RX ORDER — SODIUM CHLORIDE 9 MG/ML
INJECTION, SOLUTION INTRAVENOUS CONTINUOUS
Status: DISCONTINUED | OUTPATIENT
Start: 2019-09-10 | End: 2019-09-11

## 2019-09-10 RX ORDER — ADHESIVE BANDAGE
30 BANDAGE TOPICAL DAILY PRN
Status: DISCONTINUED | OUTPATIENT
Start: 2019-09-10 | End: 2019-09-20

## 2019-09-10 RX ORDER — ONDANSETRON 2 MG/ML
4 INJECTION INTRAMUSCULAR; INTRAVENOUS EVERY 6 HOURS PRN
Status: DISCONTINUED | OUTPATIENT
Start: 2019-09-10 | End: 2019-10-01 | Stop reason: HOSPADM

## 2019-09-10 RX ORDER — OXYCODONE HYDROCHLORIDE 5 MG/1
5 TABLET ORAL EVERY 4 HOURS PRN
Status: CANCELLED | OUTPATIENT
Start: 2019-09-10

## 2019-09-10 RX ORDER — MAGNESIUM SULFATE HEPTAHYDRATE 40 MG/ML
2 INJECTION, SOLUTION INTRAVENOUS
Status: CANCELLED | OUTPATIENT
Start: 2019-09-10

## 2019-09-10 RX ORDER — MUPIROCIN 20 MG/G
1 OINTMENT TOPICAL
Status: COMPLETED | OUTPATIENT
Start: 2019-09-10 | End: 2019-09-10

## 2019-09-10 RX ORDER — PROTAMINE SULFATE 10 MG/ML
INJECTION, SOLUTION INTRAVENOUS
Status: DISCONTINUED | OUTPATIENT
Start: 2019-09-10 | End: 2019-09-10

## 2019-09-10 RX ADMIN — OXYCODONE HYDROCHLORIDE 5 MG: 5 TABLET ORAL at 06:09

## 2019-09-10 RX ADMIN — CALCIUM CHLORIDE 500 MG: 100 INJECTION, SOLUTION INTRAVENOUS at 10:09

## 2019-09-10 RX ADMIN — PROPOFOL 30 MG: 10 INJECTION, EMULSION INTRAVENOUS at 10:09

## 2019-09-10 RX ADMIN — PROTAMINE SULFATE 50 MG: 10 INJECTION, SOLUTION INTRAVENOUS at 10:09

## 2019-09-10 RX ADMIN — MIDAZOLAM HYDROCHLORIDE 2 MG: 1 INJECTION, SOLUTION INTRAMUSCULAR; INTRAVENOUS at 08:09

## 2019-09-10 RX ADMIN — ACETAMINOPHEN 1000 MG: 10 INJECTION, SOLUTION INTRAVENOUS at 08:09

## 2019-09-10 RX ADMIN — NICARDIPINE HYDROCHLORIDE 1 MG/HR: 0.2 INJECTION, SOLUTION INTRAVENOUS at 03:09

## 2019-09-10 RX ADMIN — HEPARIN SODIUM 35000 UNITS: 1000 INJECTION, SOLUTION INTRAVENOUS; SUBCUTANEOUS at 09:09

## 2019-09-10 RX ADMIN — VANCOMYCIN HYDROCHLORIDE 1250 MG: 1.25 INJECTION, POWDER, LYOPHILIZED, FOR SOLUTION INTRAVENOUS at 06:09

## 2019-09-10 RX ADMIN — LIDOCAINE HYDROCHLORIDE 100 MG: 20 INJECTION, SOLUTION INTRAVENOUS at 10:09

## 2019-09-10 RX ADMIN — FENTANYL CITRATE 50 MCG: 50 INJECTION INTRAMUSCULAR; INTRAVENOUS at 03:09

## 2019-09-10 RX ADMIN — MIDAZOLAM HYDROCHLORIDE 2 MG: 1 INJECTION, SOLUTION INTRAMUSCULAR; INTRAVENOUS at 07:09

## 2019-09-10 RX ADMIN — ASPIRIN 325 MG ORAL TABLET 325 MG: 325 PILL ORAL at 09:09

## 2019-09-10 RX ADMIN — TRANEXAMIC ACID 1 MG/KG/HR: 100 INJECTION, SOLUTION INTRAVENOUS at 07:09

## 2019-09-10 RX ADMIN — ROCURONIUM BROMIDE 100 MG: 10 INJECTION, SOLUTION INTRAVENOUS at 07:09

## 2019-09-10 RX ADMIN — VASOPRESSIN 0.04 UNITS/MIN: 20 INJECTION INTRAVENOUS at 09:09

## 2019-09-10 RX ADMIN — VASOPRESSIN 2 UNITS: 20 INJECTION INTRAVENOUS at 10:09

## 2019-09-10 RX ADMIN — FUROSEMIDE 20 MG: 10 INJECTION, SOLUTION INTRAMUSCULAR; INTRAVENOUS at 11:09

## 2019-09-10 RX ADMIN — PHYTONADIONE 10 MG: 10 INJECTION, EMULSION INTRAMUSCULAR; INTRAVENOUS; SUBCUTANEOUS at 12:09

## 2019-09-10 RX ADMIN — PROPOFOL 50 MCG/KG/MIN: 10 INJECTION, EMULSION INTRAVENOUS at 10:09

## 2019-09-10 RX ADMIN — PROTAMINE SULFATE 260 MG: 10 INJECTION, SOLUTION INTRAVENOUS at 10:09

## 2019-09-10 RX ADMIN — SODIUM CHLORIDE 2 UNITS/HR: 9 INJECTION, SOLUTION INTRAVENOUS at 08:09

## 2019-09-10 RX ADMIN — ONDANSETRON 4 MG: 2 INJECTION INTRAMUSCULAR; INTRAVENOUS at 07:09

## 2019-09-10 RX ADMIN — LIDOCAINE HYDROCHLORIDE 100 MG: 20 INJECTION, SOLUTION INTRAVENOUS at 09:09

## 2019-09-10 RX ADMIN — FUROSEMIDE 10 MG: 10 INJECTION, SOLUTION INTRAMUSCULAR; INTRAVENOUS at 11:09

## 2019-09-10 RX ADMIN — NICARDIPINE HYDROCHLORIDE 5 MG/HR: 0.2 INJECTION, SOLUTION INTRAVENOUS at 10:09

## 2019-09-10 RX ADMIN — EPINEPHRINE 0.05 MCG/KG/MIN: 1 INJECTION INTRAMUSCULAR; INTRAVENOUS; SUBCUTANEOUS at 09:09

## 2019-09-10 RX ADMIN — ETOMIDATE 10 MG: 2 INJECTION, SOLUTION INTRAVENOUS at 07:09

## 2019-09-10 RX ADMIN — MUPIROCIN: 20 OINTMENT TOPICAL at 09:09

## 2019-09-10 RX ADMIN — EPINEPHRINE 0.02 MCG/KG/MIN: 1 INJECTION INTRAMUSCULAR; INTRAVENOUS; SUBCUTANEOUS at 07:09

## 2019-09-10 RX ADMIN — NITROGLYCERIN 50 MCG: 5 INJECTION, SOLUTION INTRAVENOUS at 10:09

## 2019-09-10 RX ADMIN — Medication: at 08:09

## 2019-09-10 RX ADMIN — NOREPINEPHRINE BITARTRATE 0.02 MCG/KG/MIN: 1 INJECTION, SOLUTION, CONCENTRATE INTRAVENOUS at 09:09

## 2019-09-10 RX ADMIN — FENTANYL CITRATE 300 MCG: 50 INJECTION, SOLUTION INTRAMUSCULAR; INTRAVENOUS at 08:09

## 2019-09-10 RX ADMIN — FENTANYL CITRATE 200 MCG: 50 INJECTION, SOLUTION INTRAMUSCULAR; INTRAVENOUS at 07:09

## 2019-09-10 RX ADMIN — Medication 20 MG: at 11:09

## 2019-09-10 RX ADMIN — HYDROMORPHONE HYDROCHLORIDE 0.5 MG: 1 INJECTION, SOLUTION INTRAMUSCULAR; INTRAVENOUS; SUBCUTANEOUS at 04:09

## 2019-09-10 RX ADMIN — TRANEXAMIC ACID 1000 MG: 100 INJECTION, SOLUTION INTRAVENOUS at 08:09

## 2019-09-10 RX ADMIN — Medication 30 MG: at 07:09

## 2019-09-10 RX ADMIN — LIDOCAINE HYDROCHLORIDE 100 MG: 20 INJECTION, SOLUTION INTRAVENOUS at 07:09

## 2019-09-10 RX ADMIN — PANTOPRAZOLE SODIUM 40 MG: 40 INJECTION, POWDER, LYOPHILIZED, FOR SOLUTION INTRAVENOUS at 03:09

## 2019-09-10 RX ADMIN — MUPIROCIN 1 G: 20 OINTMENT TOPICAL at 06:09

## 2019-09-10 RX ADMIN — ALBUMIN (HUMAN) 500 ML: 12.5 SOLUTION INTRAVENOUS at 01:09

## 2019-09-10 RX ADMIN — POTASSIUM CHLORIDE 20 MEQ: 10 INJECTION, SOLUTION INTRAVENOUS at 11:09

## 2019-09-10 RX ADMIN — CALCIUM CHLORIDE 250 MG: 100 INJECTION, SOLUTION INTRAVENOUS at 10:09

## 2019-09-10 RX ADMIN — CEFEPIME 2 G: 2 INJECTION, POWDER, FOR SOLUTION INTRAMUSCULAR; INTRAVENOUS at 09:09

## 2019-09-10 RX ADMIN — SODIUM CHLORIDE, SODIUM GLUCONATE, SODIUM ACETATE, POTASSIUM CHLORIDE, MAGNESIUM CHLORIDE, SODIUM PHOSPHATE, DIBASIC, AND POTASSIUM PHOSPHATE: .53; .5; .37; .037; .03; .012; .00082 INJECTION, SOLUTION INTRAVENOUS at 07:09

## 2019-09-10 RX ADMIN — RIFAMPIN 600 MG: 600 INJECTION, POWDER, LYOPHILIZED, FOR SOLUTION INTRAVENOUS at 06:09

## 2019-09-10 RX ADMIN — FENTANYL CITRATE 25 MCG: 50 INJECTION INTRAMUSCULAR; INTRAVENOUS at 03:09

## 2019-09-10 RX ADMIN — MAGNESIUM SULFATE IN WATER 2 G: 40 INJECTION, SOLUTION INTRAVENOUS at 09:09

## 2019-09-10 RX ADMIN — DEXTROSE MONOHYDRATE, SODIUM CHLORIDE, AND POTASSIUM CHLORIDE: 50; 4.5; 2.98 INJECTION, SOLUTION INTRAVENOUS at 01:09

## 2019-09-10 RX ADMIN — ALBUMIN (HUMAN) 25 G: 12.5 SOLUTION INTRAVENOUS at 03:09

## 2019-09-10 RX ADMIN — ROCURONIUM BROMIDE 50 MG: 10 INJECTION, SOLUTION INTRAVENOUS at 09:09

## 2019-09-10 RX ADMIN — POTASSIUM CHLORIDE 40 MEQ: 200 INJECTION, SOLUTION INTRAVENOUS at 01:09

## 2019-09-10 NOTE — SUBJECTIVE & OBJECTIVE
Interval HPI:   Received in ICU. LVA. Remains intubated and sedated.  BG at or slightly above goal on IV insulin infusion rates 1-3 u/hr.   Eating:   NPO  Nausea: No  Hypoglycemia and intervention: No  Fever: No  TPN and/or TF: No    PMH, PSH, FH, SH  reviewed       Review of Systems   Unable to obtain due to: Sedation,Intubation,Altered mental status,Critical illness,Reviewed ROS from note dated 9/6/19 per Yris Mallory NP    Current Medications and/or Treatments Impacting Glycemic Control  Immunotherapy:    Immunosuppressants     None        Steroids:   Hormones (From admission, onward)    None        Pressors:    Autonomic Drugs (From admission, onward)    Start     Stop Route Frequency Ordered    09/10/19 1515  EPINEPHrine (ADRENALIN) 5 mg in sodium chloride 0.9% 250 mL infusion     Question Answer Comment   Titrate by: (in mcg/kg/min) 0.02    Titrate interval: (in minutes) 5    Titrate to maintain: (SBP or MAP or Cardiac Index) MAP    Greater than: (in mmHg) 70    Maximum dose: (in mcg/kg/min) 0.1        -- IV Continuous 09/10/19 1419        Hyperglycemia/Diabetes Medications:   Antihyperglycemics (From admission, onward)    Start     Stop Route Frequency Ordered    09/10/19 1545  insulin regular (Humulin R) 100 Units in sodium chloride 0.9% 100 mL infusion     Question:  Insulin Rate Adjustment (DO NOT MODIFY ANSWER)  Answer:  [email or web address]    -- IV Continuous 09/10/19 1433             PHYSICAL EXAMINATION:  Vitals:    09/10/19 1445   BP:    Pulse: 78   Resp: 14   Temp:      Body mass index is 36.05 kg/m².    Physical Exam   Constitutional: Well developed, well nourished, NAD.  ENT: External ears no masses with nose patent; normal hearing.  Neck: Supple; trachea midline.  Cardiovascular: LVAD, no LE edema.   Lungs: Intubated on ventilator; lungs anterior bilaterally clear to auscultation.  Abdomen: Soft, no masses, no hernias. Hypoactive bowel  sounds.   MS: No clubbing or cyanosis of nails noted;  unable to assess gait.  Skin: No rashes, lesions, or ulcers; no nodules. Mid-sternal incision with dressing; chest tubes.   Psychiatric: BERNARD  Neurological: BERNARD  Foot: nails in good condition, no amputations noted.

## 2019-09-10 NOTE — PROGRESS NOTES
Update:    SW to pt's room for update. Pt laying in bed, intubated and sedated. SW found pt's family in waiting room. Pt's mother and friend report coping well at this time. They both report yesterday was difficult and busy, but now that pt is out of surgery and doing well they are coping much better. Pt's friend reports plan to return home for a few days, pt's mother reports plan to stay in [facility] tonight and stay in pt's room for the remainder of the week. SW providing emotional support for pt's family. No other needs reported or identified at this time. SW providing ongoing psychosocial and counseling support, education, assistance, resources, and discharge planning as indicated. SW continuing to follow and remains available.

## 2019-09-10 NOTE — ASSESSMENT & PLAN NOTE
BG goal 140 - 180     Continue IV insulin infusion protocol  Requires intensive BG monitoring while on protocol

## 2019-09-10 NOTE — SUBJECTIVE & OBJECTIVE
Interval History: s/p LVAD today. On epi 0.05 and  2.5.    Continuous Infusions:   sodium chloride 0.9%      dextrose 5 % and 0.45 % NaCl with KCl 40 mEq 10 mL/hr at 09/10/19 1334    DOBUTamine 2.5 mcg/kg/min (09/10/19 1500)    epinephrine 0.05 mcg/kg/min (09/10/19 1500)    insulin (HUMAN R) infusion (adults) 1 Units/hr (09/10/19 1400)    nicardipine      nitric oxide gas      propofol Stopped (09/10/19 1500)     Scheduled Meds:   albumin human 5%  25 g Intravenous Once    [START ON 9/11/2019] aspirin  325 mg Oral Daily    aspirin  325 mg Per NG tube Daily    [START ON 9/11/2019] ceFEPime (MAXIPIME) IVPB  2 g Intravenous Q24H    docusate sodium  200 mg Oral QHS    fentaNYL        mupirocin   Nasal BID    pantoprazole  40 mg Intravenous Daily    [START ON 9/11/2019] rifAMpin (RIFADIN) IVPB  600 mg Intravenous Q24H    [START ON 9/11/2019] vancomycin (VANCOCIN) IVPB  15 mg/kg Intravenous Q24H     PRN Meds:albumin human 5%, albuterol sulfate, bisacodyl, Dextrose 10% Bolus, Dextrose 10% Bolus, Dextrose 10% Bolus, Dextrose 10% Bolus, fentaNYL, magnesium hydroxide 400 mg/5 ml, magnesium sulfate IVPB, potassium chloride, potassium chloride, sodium chloride 0.9%    Review of patient's allergies indicates:   Allergen Reactions    Adhesive Blisters     Reaction to area in chest and up only    Codeine Itching     Objective:     Vital Signs (Most Recent):  Temp: 97 °F (36.1 °C) (09/10/19 1400)  Pulse: (!) 32 (09/10/19 1515)  Resp: 16 (09/10/19 1515)  BP: 108/78 (09/10/19 1400)  SpO2: (!) 80 % (09/10/19 1515) Vital Signs (24h Range):  Temp:  [97 °F (36.1 °C)-98.5 °F (36.9 °C)] 97 °F (36.1 °C)  Pulse:  [] 32  Resp:  [12-48] 16  SpO2:  [80 %-100 %] 80 %  BP: (108-119)/(64-78) 108/78  Arterial Line BP: (77-92)/(62-78) 77/62     Patient Vitals for the past 72 hrs (Last 3 readings):   Weight   09/10/19 1300 104.4 kg (230 lb 2.6 oz)   09/10/19 0417 104.4 kg (230 lb 2.6 oz)   09/09/19 0740 104.6 kg (230 lb  9.6 oz)     Body mass index is 36.05 kg/m².      Intake/Output Summary (Last 24 hours) at 9/10/2019 1529  Last data filed at 9/10/2019 1500  Gross per 24 hour   Intake 2653 ml   Output 2280 ml   Net 373 ml       Hemodynamic Parameters:  PAP: (26-35)/(14-20) 26/16  PAP (Mean):  [19 mmHg-25 mmHg] 19 mmHg  PCWP:  [17 mmHg] 17 mmHg  CO:  [5.4 L/min] 5.4 L/min  CI:  [2.4 L/min/m2] 2.4 L/min/m2    Physical Exam   Constitutional: She appears well-developed and well-nourished. No distress.   Eyes: Conjunctivae are normal. Right eye exhibits no discharge. Left eye exhibits no discharge.   Neck: No JVD present.   Cardiovascular: Normal rate, regular rhythm and normal heart sounds.   Smooth LVAD hum   Pulmonary/Chest: Effort normal and breath sounds normal. No stridor. No respiratory distress. She has no wheezes.   Intubated   Abdominal: Soft. Bowel sounds are normal.   Musculoskeletal: Normal range of motion. She exhibits no edema or deformity.   Neurological: She is alert.   Skin: Skin is warm and dry. Capillary refill takes less than 2 seconds. She is not diaphoretic. No erythema.       Significant Labs:  CBC:  Recent Labs   Lab 09/09/19  0459 09/10/19  0536  09/10/19  1105 09/10/19  1236 09/10/19  1237   WBC 6.94 6.77  --   --   --  18.42*   RBC 3.97* 4.24  --   --   --  3.17*   HGB 11.4* 12.2  --   --   --  9.3*   HCT 36.8* 38.7   < > 26* 30* 29.6*    281  --   --   --  163   MCV 93 91  --   --   --  93   MCH 28.7 28.8  --   --   --  29.3   MCHC 31.0* 31.5*  --   --   --  31.4*    < > = values in this interval not displayed.     BNP:  Recent Labs   Lab 09/06/19  1834   *     CMP:  Recent Labs   Lab 09/08/19  0511 09/09/19  0459 09/10/19  0536 09/10/19  1237   * 114* 117* 182*   CALCIUM 9.6 9.4 9.7 8.8   ALBUMIN 3.9 3.5 3.7  --    PROT 7.3 6.7 7.0  --    * 139 137 140   K 3.7 3.8 3.8 3.6   CO2 27 24 22* 22*   CL 98 102 102 104   BUN 23* 24* 25* 23*   CREATININE 1.7* 1.5* 1.8* 1.5*   ALKPHOS 86 74  76  --    ALT 15 17 23  --    AST 19 22 33  --    BILITOT 0.7 0.6 0.5  --       Coagulation:   Recent Labs   Lab 09/07/19  0517 09/10/19  0946 09/10/19  1237   INR 1.0 1.8* 1.1   APTT 25.3  --  27.7     LDH:  No results for input(s): LDH in the last 72 hours.  Microbiology:  Microbiology Results (last 7 days)     ** No results found for the last 168 hours. **          I have reviewed all pertinent labs within the past 24 hours.    Estimated Creatinine Clearance: 56.4 mL/min (A) (based on SCr of 1.5 mg/dL (H)).    Diagnostic Results:  I have reviewed and interpreted all pertinent imaging results/findings within the past 24 hours.

## 2019-09-10 NOTE — SUBJECTIVE & OBJECTIVE
Interval History: Pt to have LVAD placed tomorrow morning.     Past Medical History:   Diagnosis Date    Fractures     History of ventricular fibrillation 9/4/2019    History of ventricular tachycardia 9/4/2019    Hyperlipidemia     Migraine     Osteoarthritis        Past Surgical History:   Procedure Laterality Date    BACK SURGERY      2007    BONE GRAFT Left     from Left hip to Left FA    eardrum reconstruction  1980    ELBOW SURGERY Left [id number]    FOREARM FRACTURE SURGERY Bilateral [id number]    multiple surgeries    INSERTION OF IMPLANTABLE CARDIOVERTER-DEFIBRILLATOR (ICD) GENERATOR WITH TWO EXISTING LEADS      INSERTION OF PACEMAKER Left     INSERTION, CATHETER, RIGHT HEART Right 9/4/2019    Performed by [facility] at [facility] C[facility]    LUMBAR FUSION  2007    L4-L5    SINUS SURGERY Right 1994    with lymph nodes    TEMPOROMANDIBULAR JOINT SURGERY Right 1988    TONSILLECTOMY      TYMPANOSTOMY TUBE PLACEMENT  1971- 1979    multiple tube placements       Review of patient's allergies indicates:   Allergen Reactions    Adhesive Blisters     Reaction to area in chest and up only    Codeine Itching       Medications:  Continuous Infusions:   DOBUTamine 5 mcg/kg/min (09/09/19 1552)     Scheduled Meds:   amiodarone  200 mg Oral BID    aspirin  81 mg Oral Daily    ceFEPime (MAXIPIME) IVPB  2 g Intravenous Once Pre-Op    docusate sodium  100 mg Oral Daily    furosemide  40 mg Intravenous BID    gabapentin  400 mg Oral TID    guaiFENesin  600 mg Oral BID    magnesium citrate  296 mL Oral Once    mupirocin   Nasal BID    rifAMpin (RIFADIN) IVPB  600 mg Irrigation Once    spironolactone  25 mg Oral Daily    venlafaxine  150 mg Oral Daily     PRN Meds:acetaminophen, bisacodyl, diphenhydrAMINE, oxyCODONE-acetaminophen, promethazine, senna, sodium chloride 0.9%, zolpidem    Family History     Problem Relation (Age of Onset)    Broken bones Maternal Grandmother    Cancer Paternal  Grandmother    Dislocations Maternal Grandmother    Heart failure Paternal Grandfather, Maternal Grandfather    Migraines Mother, Maternal Grandmother, Paternal Grandmother, Paternal Grandfather, Maternal Grandfather    Obesity Paternal Grandmother    Osteoarthritis Mother, Maternal Grandmother, Paternal Grandmother, Paternal Grandfather, Maternal Grandfather, Father    Osteoporosis Maternal Grandmother    Scoliosis Maternal Grandmother    Stroke Father        Tobacco Use    Smoking status: Never Smoker    Smokeless tobacco: Never Used   Substance and Sexual Activity    Alcohol use: No    Drug use: No    Sexual activity: Not Currently       Review of Systems   Constitutional: Positive for activity change and fatigue.   Respiratory: Positive for shortness of breath.    Cardiovascular: Negative for chest pain.   Neurological: Positive for weakness.     Objective:     Vital Signs (Most Recent):  Temp: 98.3 °F (36.8 °C) (09/10/19 0412)  Pulse: 89 (09/10/19 0412)  Resp: 18 (09/10/19 0412)  BP: 117/65 (09/10/19 0417)  SpO2: 98 % (09/10/19 0412) Vital Signs (24h Range):  Temp:  [96.4 °F (35.8 °C)-98.5 °F (36.9 °C)] 98.3 °F (36.8 °C)  Pulse:  [] 89  Resp:  [16-18] 18  SpO2:  [96 %-98 %] 98 %  BP: ()/(54-66) 117/65     Weight: 104.4 kg (230 lb 2.6 oz)  Body mass index is 36.05 kg/m².    Review of Symptoms  Symptom Assessment (ESAS 0-10 scale)   ESAS 0 1 2 3 4 5 6 7 8 9 10   Pain X             Dyspnea              Anxiety              Nausea              Depression               Anorexia              Fatigue      X        Insomnia              Restlessness               Agitation              CAM / Delirium __ --  ___+   Constipation     __ --  ___+   Diarrhea           __ --  ___+  Bowel Management Plan (BMP): No    Pain Assessment: No pain noted    Performance Status: 60    ECOG Performance Status Grade: 2 - Ambulates, capable of self care only    Physical Exam   Constitutional: She is oriented to person,  place, and time. She is cooperative.   HENT:   Head: Normocephalic and atraumatic.   Pulmonary/Chest: Effort normal.   Neurological: She is alert and oriented to person, place, and time.   Skin: Skin is warm and dry.   Psychiatric: She has a normal mood and affect. Her speech is normal and behavior is normal.       Significant Labs: All pertinent labs within the past 24 hours have been reviewed.  CBC:   Recent Labs   Lab 09/10/19  0536   WBC 6.77   HGB 12.2   HCT 38.7   MCV 91        BMP:  No results for input(s): GLU, NA, K, CL, CO2, BUN, CREATININE, CALCIUM, MG in the last 24 hours.  LFT:  Lab Results   Component Value Date    AST 22 09/09/2019    ALKPHOS 74 09/09/2019    BILITOT 0.6 09/09/2019     Albumin:   Albumin   Date Value Ref Range Status   09/09/2019 3.5 3.5 - 5.2 g/dL Final     Protein:   Total Protein   Date Value Ref Range Status   09/09/2019 6.7 6.0 - 8.4 g/dL Final     Lactic acid:   No results found for: LACTATE    Significant Imaging: I have reviewed all pertinent imaging results/findings within the past 24 hours.    Advance Care Planning   Advanced Directives::  Living Will: No  LaPOST: No  Do Not Resuscitate Status: No  Medical Power of : Completed with pt. Pt want her mother, Cristal to be first MPOA.     Decision-Making Capacity: Patient answered questions, Family answered questions       Living Arrangements: Lives with family. Pt will move in with sister in [place].     Psychosocial/Cultural:  Pt lives by herself. Pt has a mother who lives in [place] and a sisiter who lives in [place]. Pt livesmin [place].  Her caregiver will be her sister, Flavia who pt will move in with in [place] after LVAD surgery.     Spiritual: no    F- Julita and Belief:n/a    I - Importance: no  .  C - Community: no    A - Address in Care: Pt open to  visits for family.

## 2019-09-10 NOTE — HPI
Reason for Consult: Management of Hyperglycemia     Surgical Procedure and Date: LVAD 9/10/19    HPI:   Patient is a 48 y.o. female with a diagnosis of NICM, CKD, and HLD. Patient admitted with for advanced heart failure options. No previous history of DM per chart review. A1C elevated on pre-op A1C. Endocrinology consulted post-LVAD for BG management.

## 2019-09-10 NOTE — PROGRESS NOTES
[facility]  Heart Transplant  Progress Note    Patient Name: Deborah Navas  MRN: [id number]  Admission Date: 9/4/2019  Hospital Length of Stay: 6 days  Attending Physician: Jolene Lua MD  Primary Care Provider: Primary Doctor No  Principal Problem:Acute on chronic combined systolic and diastolic heart failure    Subjective:     Interval History: s/p LVAD today. On epi 0.05 and  2.5.    Continuous Infusions:   sodium chloride 0.9%      dextrose 5 % and 0.45 % NaCl with KCl 40 mEq 10 mL/hr at 09/10/19 1334    DOBUTamine 2.5 mcg/kg/min (09/10/19 1500)    epinephrine 0.05 mcg/kg/min (09/10/19 1500)    insulin (HUMAN R) infusion (adults) 1 Units/hr (09/10/19 1400)    nicardipine      nitric oxide gas      propofol Stopped (09/10/19 1500)     Scheduled Meds:   albumin human 5%  25 g Intravenous Once    [START ON 9/11/2019] aspirin  325 mg Oral Daily    aspirin  325 mg Per NG tube Daily    [START ON 9/11/2019] ceFEPime (MAXIPIME) IVPB  2 g Intravenous Q24H    docusate sodium  200 mg Oral QHS    fentaNYL        mupirocin   Nasal BID    pantoprazole  40 mg Intravenous Daily    [START ON 9/11/2019] rifAMpin (RIFADIN) IVPB  600 mg Intravenous Q24H    [START ON 9/11/2019] vancomycin (VANCOCIN) IVPB  15 mg/kg Intravenous Q24H     PRN Meds:albumin human 5%, albuterol sulfate, bisacodyl, Dextrose 10% Bolus, Dextrose 10% Bolus, Dextrose 10% Bolus, Dextrose 10% Bolus, fentaNYL, magnesium hydroxide 400 mg/5 ml, magnesium sulfate IVPB, potassium chloride, potassium chloride, sodium chloride 0.9%    Review of patient's allergies indicates:   Allergen Reactions    Adhesive Blisters     Reaction to area in chest and up only    Codeine Itching     Objective:     Vital Signs (Most Recent):  Temp: 97 °F (36.1 °C) (09/10/19 1400)  Pulse: (!) 32 (09/10/19 1515)  Resp: 16 (09/10/19 1515)  BP: 108/78 (09/10/19 1400)  SpO2: (!) 80 % (09/10/19 1515) Vital Signs (24h Range):  Temp:  [97 °F  (36.1 °C)-98.5 °F (36.9 °C)] 97 °F (36.1 °C)  Pulse:  [] 32  Resp:  [12-48] 16  SpO2:  [80 %-100 %] 80 %  BP: (108-119)/(64-78) 108/78  Arterial Line BP: (77-92)/(62-78) 77/62     Patient Vitals for the past 72 hrs (Last 3 readings):   Weight   09/10/19 1300 104.4 kg (230 lb 2.6 oz)   09/10/19 0417 104.4 kg (230 lb 2.6 oz)   09/09/19 0740 104.6 kg (230 lb 9.6 oz)     Body mass index is 36.05 kg/m².      Intake/Output Summary (Last 24 hours) at 9/10/2019 1529  Last data filed at 9/10/2019 1500  Gross per 24 hour   Intake 2653 ml   Output 2280 ml   Net 373 ml       Hemodynamic Parameters:  PAP: (26-35)/(14-20) 26/16  PAP (Mean):  [19 mmHg-25 mmHg] 19 mmHg  PCWP:  [17 mmHg] 17 mmHg  CO:  [5.4 L/min] 5.4 L/min  CI:  [2.4 L/min/m2] 2.4 L/min/m2    Physical Exam   Constitutional: She appears well-developed and well-nourished. No distress.   Eyes: Conjunctivae are normal. Right eye exhibits no discharge. Left eye exhibits no discharge.   Neck: No JVD present.   Cardiovascular: Normal rate, regular rhythm and normal heart sounds.   Smooth LVAD hum   Pulmonary/Chest: Effort normal and breath sounds normal. No stridor. No respiratory distress. She has no wheezes.   Intubated   Abdominal: Soft. Bowel sounds are normal.   Musculoskeletal: Normal range of motion. She exhibits no edema or deformity.   Neurological: She is alert.   Skin: Skin is warm and dry. Capillary refill takes less than 2 seconds. She is not diaphoretic. No erythema.       Significant Labs:  CBC:  Recent Labs   Lab 09/09/19  0459 09/10/19  0536  09/10/19  1105 09/10/19  1236 09/10/19  1237   WBC 6.94 6.77  --   --   --  18.42*   RBC 3.97* 4.24  --   --   --  3.17*   HGB 11.4* 12.2  --   --   --  9.3*   HCT 36.8* 38.7   < > 26* 30* 29.6*    281  --   --   --  163   MCV 93 91  --   --   --  93   MCH 28.7 28.8  --   --   --  29.3   MCHC 31.0* 31.5*  --   --   --  31.4*    < > = values in this interval not displayed.     BNP:  Recent Labs   Lab  09/06/19  1834   *     CMP:  Recent Labs   Lab 09/08/19  0511 09/09/19  0459 09/10/19  0536 09/10/19  1237   * 114* 117* 182*   CALCIUM 9.6 9.4 9.7 8.8   ALBUMIN 3.9 3.5 3.7  --    PROT 7.3 6.7 7.0  --    * 139 137 140   K 3.7 3.8 3.8 3.6   CO2 27 24 22* 22*   CL 98 102 102 104   BUN 23* 24* 25* 23*   CREATININE 1.7* 1.5* 1.8* 1.5*   ALKPHOS 86 74 76  --    ALT 15 17 23  --    AST 19 22 33  --    BILITOT 0.7 0.6 0.5  --       Coagulation:   Recent Labs   Lab 09/07/19  0517 09/10/19  0946 09/10/19  1237   INR 1.0 1.8* 1.1   APTT 25.3  --  27.7     LDH:  No results for input(s): LDH in the last 72 hours.  Microbiology:  Microbiology Results (last 7 days)     ** No results found for the last 168 hours. **          I have reviewed all pertinent labs within the past 24 hours.    Estimated Creatinine Clearance: 56.4 mL/min (A) (based on SCr of 1.5 mg/dL (H)).    Diagnostic Results:  I have reviewed and interpreted all pertinent imaging results/findings within the past 24 hours.    Assessment and Plan:     No notes on file    * Acute on chronic combined systolic and diastolic heart failure  NICM EF: 20%, LVEDD: 6.8cm. Repeat echo ordered and pending  RHC: done on admit 9/4/19 RA: 20/ 20/ 18 RV: 60/ 8/ 18 PA: 60/ 32/ 45 PWP: 43/ 70/ 40 . Cardiac output was 2.27 by Fitz. Cardiac index is 1.04 L/min/m2. O2 Sat: PA 34%. AO 95% PVR 2.2 PALMER  - S/p LVAD 9/10  - Management per CTS    CKD (chronic kidney disease)  Baseline cr around 1.3-1.4 in early 2019  - Cr 1.5 today  - Renally dose meds  - Avoid nephrotoxic agents    Abnormal CT scan  -focal opacity found at right base.  DDx: atelectasis.  Recommending repeat CT in 3 months    Enlarged thyroid  Ultrasound done and showed enlarged multinodular thyroid which warrants surveillance in one year.   - TSH and fT4 wnl    Ventricular tachyarrhythmia  - Amiodarone 200 mg PO BID  - Goal K >4 and Mg >2  - Monitor on telemetry    Jatinder lAves MD  Heart  Transplant  [facility]

## 2019-09-10 NOTE — PT/OT/SLP PROGRESS
Occupational Therapy      Patient Name:  Deborah Navas   MRN:  [id number]    Patient not seen today secondary pt having LVAD placed this date. OT to await new orders prior to initiation of OT eval.   JAGJIT Madison  9/10/2019

## 2019-09-10 NOTE — ASSESSMENT & PLAN NOTE
NICM EF: 20%, LVEDD: 6.8cm. Repeat echo ordered and pending  RHC: done on admit 9/4/19 RA: 20/ 20/ 18 RV: 60/ 8/ 18 PA: 60/ 32/ 45 PWP: 43/ 70/ 40 . Cardiac output was 2.27 by Fitz. Cardiac index is 1.04 L/min/m2. O2 Sat: PA 34%. AO 95% PVR 2.2 PALMER  - S/p LVAD 9/10  - Management per CTS

## 2019-09-10 NOTE — CONSULTS
[facility]  Endocrinology  Diabetes Consult Note    Consult Requested by: Jolene Lua MD   Reason for admit: Acute on chronic combined systolic and diastolic heart failure    HISTORY OF PRESENT ILLNESS:  Reason for Consult: Management of Hyperglycemia     Surgical Procedure and Date: LVAD 9/10/19    HPI:   Patient is a 48 y.o. female with a diagnosis of NICM, CKD, and HLD. Patient admitted with for advanced heart failure options. No previous history of DM per chart review. A1C elevated on pre-op A1C. Endocrinology consulted post-LVAD for BG management.             Interval HPI:   Received in ICU. LVA. Remains intubated and sedated.  BG at or slightly above goal on IV insulin infusion rates 1-3 u/hr.   Eating:   NPO  Nausea: No  Hypoglycemia and intervention: No  Fever: No  TPN and/or TF: No    PMH, PSH, FH, SH  reviewed       Review of Systems   Unable to obtain due to: Sedation,Intubation,Altered mental status,Critical illness,Reviewed ROS from note dated 9/6/19 per Yris Mallory NP    Current Medications and/or Treatments Impacting Glycemic Control  Immunotherapy:    Immunosuppressants     None        Steroids:   Hormones (From admission, onward)    None        Pressors:    Autonomic Drugs (From admission, onward)    Start     Stop Route Frequency Ordered    09/10/19 1515  EPINEPHrine (ADRENALIN) 5 mg in sodium chloride 0.9% 250 mL infusion     Question Answer Comment   Titrate by: (in mcg/kg/min) 0.02    Titrate interval: (in minutes) 5    Titrate to maintain: (SBP or MAP or Cardiac Index) MAP    Greater than: (in mmHg) 70    Maximum dose: (in mcg/kg/min) 0.1        -- IV Continuous 09/10/19 1419        Hyperglycemia/Diabetes Medications:   Antihyperglycemics (From admission, onward)    Start     Stop Route Frequency Ordered    09/10/19 1545  insulin regular (Humulin R) 100 Units in sodium chloride 0.9% 100 mL infusion     Question:  Insulin Rate Adjustment (DO NOT MODIFY ANSWER)  Answer:   [email or web address]    -- IV Continuous 09/10/19 1433             PHYSICAL EXAMINATION:  Vitals:    09/10/19 1445   BP:    Pulse: 78   Resp: 14   Temp:      Body mass index is 36.05 kg/m².    Physical Exam   Constitutional: Well developed, well nourished, NAD.  ENT: External ears no masses with nose patent; normal hearing.  Neck: Supple; trachea midline.  Cardiovascular: LVAD, no LE edema.   Lungs: Intubated on ventilator; lungs anterior bilaterally clear to auscultation.  Abdomen: Soft, no masses, no hernias. Hypoactive bowel sounds.   MS: No clubbing or cyanosis of nails noted;  unable to assess gait.  Skin: No rashes, lesions, or ulcers; no nodules. Mid-sternal incision with dressing; chest tubes.   Psychiatric: BERNARD  Neurological: BERNARD  Foot: nails in good condition, no amputations noted.          Labs Reviewed and Include   Recent Labs   Lab 09/10/19  0536 09/10/19  1237   * 182*   CALCIUM 9.7 8.8   ALBUMIN 3.7  --    PROT 7.0  --     140   K 3.8 3.6   CO2 22* 22*    104   BUN 25* 23*   CREATININE 1.8* 1.5*   ALKPHOS 76  --    ALT 23  --    AST 33  --    BILITOT 0.5  --      Lab Results   Component Value Date    WBC 18.42 (H) 09/10/2019    HGB 9.3 (L) 09/10/2019    HCT 29.6 (L) 09/10/2019    MCV 93 09/10/2019     09/10/2019     Recent Labs   Lab 09/06/19  0846   TSH 2.290   FREET4 1.49     Lab Results   Component Value Date    HGBA1C 6.5 (H) 09/06/2019       Nutritional status:   Body mass index is 36.05 kg/m².  Lab Results   Component Value Date    ALBUMIN 3.7 09/10/2019    ALBUMIN 3.5 09/09/2019    ALBUMIN 3.9 09/08/2019     Lab Results   Component Value Date    PREALBUMIN 32 09/06/2019       Estimated Creatinine Clearance: 56.4 mL/min (A) (based on SCr of 1.5 mg/dL (H)).    Accu-Checks  Recent Labs     09/10/19  1236 09/10/19  1340   POCTGLUCOSE 174* 108        ASSESSMENT and PLAN    * Acute on chronic combined systolic and  diastolic heart failure  Managed per primary. S/p LVAD        Acute hyperglycemia  BG goal 140 - 180     Continue IV insulin infusion protocol  Requires intensive BG monitoring while on protocol           LVAD (left ventricular assist device) present  Managed per primary.   avoid hypoglycemia        CKD (chronic kidney disease)  Titrate insulin slowly to avoid hypoglycemia as the risk of hypoglycemia increases with decreased creatinine clearance.    Estimated Creatinine Clearance: 56.4 mL/min (A) (based on SCr of 1.5 mg/dL (H)).            Plan discussed with RN at bedside.     Consuelo Adler NP  Endocrinology  [facility]

## 2019-09-10 NOTE — PLAN OF CARE
Palliative Care:      Assisted with completing Medical Power of  in conjunction with Palliative Medicine Consult for Pre-LVAD work-up.      Pt completed Power of  naming her Mother Cristal Navas as primary POA.    MPOA: Cristal Navas: [phone]  2nd: Dimitri Kitchen: [phone]  3rd Flavia Joaquin: [phone]      Scanned Healthcare POA into Epic.    Claribel Farooq, KULWINDER, ACHP-SW

## 2019-09-10 NOTE — ASSESSMENT & PLAN NOTE
Titrate insulin slowly to avoid hypoglycemia as the risk of hypoglycemia increases with decreased creatinine clearance.    Estimated Creatinine Clearance: 56.4 mL/min (A) (based on SCr of 1.5 mg/dL (H)).

## 2019-09-10 NOTE — CARE UPDATE
Patient admitted to SICU RM 97989.  Patient connected to monitor, vital signs WNL.  Dr. Walden at bedside and plan of care aware. Refer to flow sheet for vital sign gtts, and assessments.  Will continue to monitor.

## 2019-09-10 NOTE — CARE UPDATE
Patient extubated per RT to 10ppm Nictric 5LNC..  Patient tolerated without difficulty.  Will continue to monitor.
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
## 2019-09-10 NOTE — ANESTHESIA PROCEDURE NOTES
Central Line    Diagnosis: heart failure  Patient location during procedure: done in OR  Procedure start time: 9/10/2019 7:35 AM  Timeout: 9/10/2019 7:30 AM  Procedure end time: 9/10/2019 8:00 AM    Staffing  Authorizing Provider: Hilario Pepper Jr., MD  Performing Provider: Frank Cerrato MD    Staffing  Anesthesiologist: Hilario Pepper Jr., MD  Resident/CRNA: Frank Cerrato MD  Performed: resident/CRNA   Anesthesiologist was present at the time of the procedure.  Preanesthetic Checklist  Completed: patient identified, site marked, surgical consent, pre-op evaluation, timeout performed, IV checked, risks and benefits discussed, monitors and equipment checked and anesthesia consent given  Indication   Indication: hemodynamic monitoring, vascular access, med administration     Anesthesia   general anesthesia    Central Line   Skin Prep: skin prepped with ChloraPrep, skin prep agent completely dried prior to procedure  maximum sterile barriers used during central venous catheter insertion  hand hygiene performed prior to central venous catheter insertion  Location: right, internal jugular.   Catheter type: quad lumen  Catheter Size: 8.5 Fr  Inserted Catheter Length: 16 cm  Ultrasound: vascular probe with ultrasound  Vessel Caliber: medium, patent  Vascular Doppler:  not done, compressibility normal  Needle advanced into vessel with real time Ultrasound guidance.  Guidewire confirmed in vessel.   Manometry: Venous cannualation confirmed by visual estimation of blood vessel pressure using manometry.  Insertion Attempts: 1   Securement:line sutured, chlorhexidine patch, sterile dressing applied and blood return through all ports    Post-Procedure   Adverse Events:none    Guidewire Guidewire removed intact.

## 2019-09-10 NOTE — PLAN OF CARE
Problem: Adult Inpatient Plan of Care  Goal: Plan of Care Review  Outcome: Ongoing (interventions implemented as appropriate)  Patient extubated 10ppm Nictric/5LNC.  1 Liter 5% Albumin administered throughout the shift.  Family at bedside.  Questions and concerns addressed with patient and her mother.  Refer to flow sheet for vital signs, gtts, and assessments.  Will continue to monitor.

## 2019-09-10 NOTE — HPI
Pt is a 47 y/o female with hx NICM: EF 20%, LVEDD: 6.8cm, V-fib reported in setting of hypokalemia with appropriate shock from ICD (on Amiodarone), HLP. Per chart review, pt being admitted from procedure room for management of cardiogenic shock and workup for advanced options.  Patient initally say Dr. Zarate in clinic 8/27/19 as initial consult for advanced options.  At time, she reported feeling fine with only complaint of fatigue which was at baseline.         Per chart review, pt had a RHC today which showed: RA: 20/ 20/ 18 RV: 60/ 8/ 18 PA: 60/ 32/ 45 PWP: 43/ 70/ 40 . Cardiac output was 2.27 by Fitz. Cardiac index is 1.04 L/min/m2. O2 Sat: PA 34%. AO 95% PVR 2.2 PALMER. Pt admitted for diuresis, inotropic suppor, and work up for advanced options

## 2019-09-10 NOTE — PROGRESS NOTES
Morning Ht and Wt in chart. BP on both UE done, in chart. Morning hibba bath in progress. Will apply fresh gown, socks and ID bands to pt.

## 2019-09-10 NOTE — NURSING
Paged by RN that family at bedside asking for [facility] room reservation. Explained to RN that [facility] reservation is for the night of surgery, not before. RN will relay to family.

## 2019-09-10 NOTE — H&P
History & Physical  Surgical Intensive Care    SUBJECTIVE:     Chief Complaint/Reason for Admission: S/p Insertion LVAD 9/10    History of Present Illness:  47 yo female with hx NICM: EF 20%, LVEDD: 6.8cm, V-fib reported in setting of hypokalemia with appropriate shock from ICD (on Amiodarone), HLP, admitted from procedure room for management of cardiogenic shock and workup for advanced options.  Patient initally saw Dr. Zarate in clinic 8/27/19 as initial consult for advanced options.  At time, she reported feeling fine with only complaint of fatigue which was at baseline. RHC 9/4/19 which showed: RA: 20/ 20/ 18 RV: 60/ 8/ 18 PA: 60/ 32/ 45 PWP: 43/ 70/ 40 . Cardiac output was 2.27 by Fitz. Cardiac index is 1.04 L/min/m2. O2 Sat: PA 34%. AO 95% PVR 2.2 PALMER.  She is being admitted for diuresis, inotropic support (if she tolerates it from the arrhythmia standpoint), and work up for possible LVAD/OHT. Patient determined to be a surgical candidate, due to BMI 35, decision to proceed with VAD implantation.      Hospital/ICU course:   Patient was taken to the operating room 9/10/19 for insertion of LVAD, insertion of graft pericardium, closure of sternum. She tolerated the procedure well and arrived to the SICU intubated and sedated, on vasopressors in stable condition. LVAD rate of 5300rpm, flow at 4/31. Ventilation A/C at rate 14, FiO2 59%, PEEP 5. Epi 0.05, Vaso, insulin gtt, Levo DC to MAP goal 70-80. She received a bolus 500 albumin. 2 mediastinal CT to wall suction, adequate UOP with parrish.       PTA Medications   Medication Sig    amiodarone (PACERONE) 200 MG Tab Take 200 mg by mouth 2 (two) times daily.     aspirin (ECOTRIN) 81 MG EC tablet Take 81 mg by mouth.    atorvastatin (LIPITOR) 40 MG tablet Take 40 mg by mouth once daily.    cpm-phenyleph-acetaminophen (NOREL AD) 4- mg Tab Take by mouth 2 (two) times daily.    docusate sodium (COLACE) 100 MG capsule Take 100 mg by mouth once daily.     furosemide (LASIX) 40 MG tablet Take 40 mg by mouth.    gabapentin (NEURONTIN) 800 MG tablet Take 400 mg by mouth 3 (three) times daily.     loratadine (CLARITIN) 10 mg tablet Take 10 mg by mouth daily as needed.    oxycodone-acetaminophen (PERCOCET) 5-325 mg per tablet Take 1 tablet by mouth every 4 (four) hours as needed for Pain.    potassium chloride (K-TAB) 20 mEq Take 2 tablets by mouth.    promethazine (PHENERGAN) 25 MG tablet Take 25 mg by mouth every 6 (six) hours as needed for Nausea.    spironolactone (ALDACTONE) 25 MG tablet Take 25 mg by mouth.    VENLAFAXINE HCL (EFFEXOR ORAL) Take 150 mg by mouth once daily.    ZOLPIDEM TARTRATE (AMBIEN ORAL) Take 12.5 mg by mouth nightly as needed.    metOLazone (ZAROXOLYN) 5 MG tablet Take 2.5 mg by mouth daily as needed.       Review of patient's allergies indicates:   Allergen Reactions    Adhesive Blisters     Reaction to area in chest and up only    Codeine Itching       Past Medical History:   Diagnosis Date    Fractures     History of ventricular fibrillation 9/4/2019    History of ventricular tachycardia 9/4/2019    Hyperlipidemia     Migraine     Osteoarthritis      Past Surgical History:   Procedure Laterality Date    BACK SURGERY      2007    BONE GRAFT Left     from Left hip to Left FA    eardrum reconstruction  1980    ELBOW SURGERY Left [id number]    FOREARM FRACTURE SURGERY Bilateral [id number]    multiple surgeries    INSERTION OF IMPLANTABLE CARDIOVERTER-DEFIBRILLATOR (ICD) GENERATOR WITH TWO EXISTING LEADS      INSERTION OF PACEMAKER Left     INSERTION, CATHETER, RIGHT HEART Right 9/4/2019    Performed by Vi Bryant MD at [facility] CATH LAB    LUMBAR FUSION  2007    L4-L5    SINUS SURGERY Right 1994    with lymph nodes    TEMPOROMANDIBULAR JOINT SURGERY Right 1988    TONSILLECTOMY      TYMPANOSTOMY TUBE PLACEMENT  1971- 1979    multiple tube placements     Family History   Problem Relation Age of Onset     Osteoarthritis Mother     Migraines Mother     Osteoarthritis Maternal Grandmother     Migraines Maternal Grandmother     Broken bones Maternal Grandmother     Osteoporosis Maternal Grandmother     Dislocations Maternal Grandmother     Scoliosis Maternal Grandmother     Osteoarthritis Paternal Grandmother     Cancer Paternal Grandmother         leukemia    Migraines Paternal Grandmother     Obesity Paternal Grandmother     Osteoarthritis Paternal Grandfather     Heart failure Paternal Grandfather     Migraines Paternal Grandfather     Heart failure Maternal Grandfather     Migraines Maternal Grandfather     Osteoarthritis Maternal Grandfather     Stroke Father     Osteoarthritis Father      Social History     Tobacco Use    Smoking status: Never Smoker    Smokeless tobacco: Never Used   Substance Use Topics    Alcohol use: No    Drug use: No        Review of Systems:  Review of systems not obtained due to patient factors intubated and sedation on ventilator.    OBJECTIVE:     Vital Signs (Most Recent)  Temp: 98.3 °F (36.8 °C) (09/10/19 0412)  Pulse: 80 (09/10/19 1345)  Resp: 13 (09/10/19 1345)  BP: 117/65 (09/10/19 0417)  SpO2: 100 % (09/10/19 1345)  Ventilator Data (Last 24H):     Vent Mode: A/C  Oxygen Concentration (%):  [60] 60  Resp Rate Total:  [18 br/min-23 br/min] 18 br/min  Vt Set:  [350 mL] 350 mL  PEEP/CPAP:  [5 cmH20] 5 cmH20  Mean Airway Pressure:  [12 cmH20] 12 cmH20    Hemodynamic Parameters (Last 24H):  PAP: (31-35)/(14-20) 34/20  PAP (Mean):  [20 mmHg-25 mmHg] 25 mmHg    Physical Exam:  General: appears acutely ill, sedated, intubated, moderately obese  Head: normocephalic, atraumatic  Lungs:  clear to auscultation bilaterally, normal respiratory effort and on ventilator   Chest Wall: no tenderness  Heart: Normal rate/rhythm, LVAD in place  Abdomen: soft, non-tender non-distented; bowel sounds normal; no masses,  no organomegaly  Pulses: Carotid R: 2+ (normal)/L: 2+ (normal),  Brachial R: 2+ (normal)/L: 2+ (normal), Radial R: 2+ (normal)/L: 2+ (normal), Ulnar R: 2+ (normal)/L: 2+ (normal), Dorsalis Pedis R: 2+ (normal)/L: 2+ (normal), Posterior Tibial R: 2+ (normal)/L: 2+ (normal)  Skin: Skin color, texture, turgor normal. No rashes or lesions    Lines/Drains:       Percutaneous Central Line Insertion/Assessment - Quad lumen  09/10/19 0735 (Active)   Number of days: 0            Pulmonary Artery Catheter Assessment  09/10/19 0735 (Active)   Number of days: 0            Percutaneous Central Line Insertion/Assessment - quad lumen  09/10/19 0735 (Active)   Number of days: 0            Peripheral IV - Single Lumen 09/09/19 1030 18 G Left Antecubital (Active)   Site Assessment Clean;Dry;Intact 9/9/2019  8:00 PM   Line Status Flushed 9/9/2019  8:00 PM   Dressing Status Clean;Dry;Intact 9/9/2019  8:00 PM   Dressing Intervention New dressing 9/9/2019  8:00 PM   Dressing Change Due 09/13/19 9/9/2019  8:00 PM   Site Change Due 09/13/19 9/9/2019  8:00 PM   Reason Not Rotated Not due 9/9/2019  8:00 PM   Number of days: 1            Peripheral IV - Single Lumen 09/09/19 1600 20 G Left Forearm (Active)   Site Assessment Clean;Dry;Intact 9/9/2019  8:00 PM   Line Status Infusing 9/9/2019  8:00 PM   Dressing Status Clean;Dry;Intact 9/9/2019  8:00 PM   Dressing Intervention New dressing 9/9/2019  8:00 PM   Dressing Change Due 09/13/19 9/9/2019  8:00 PM   Site Change Due 09/13/19 9/9/2019  8:00 PM   Reason Not Rotated Not due 9/9/2019  8:00 PM   Number of days: 0            Arterial Line 09/10/19 0710 Right Other (Comment) (Active)   Number of days: 0            VAD 09/10/19 1322 Left ventricular assist device HeartMate 3 (Active)   Equipment inventory at  Implant 9/10/2019  1:00 PM   Pump type HeartMate3 9/10/2019  1:00 PM   Primary Controller [facility] [id number] 9/10/2019  1:00 PM   Extra Controller [id number] 9/10/2019  1:00 PM   Battery 1 [id number] 9/10/2019  1:00 PM   Battery 2 [id number] 9/10/2019  1:00 PM    Battery 3 [id number] 9/10/2019  1:00 PM   Battery 4 [id number] 9/10/2019  1:00 PM   Battery 5 [id number] 9/10/2019  1:00 PM   Battery 6 [id number] 9/10/2019  1:00 PM   Battery 7 [id number] 9/10/2019  1:00 PM   Battery 8 [id number] 9/10/2019  1:00 PM   AC Adapter 1 MPU POWER CORD L/N: [id number] 9/10/2019  1:00 PM   [place] [facility] [id number] 9/10/2019  1:00 PM   Mobile Power Unit [id number] 9/10/2019  1:00 PM   Battery Clips [id number] 9/10/2019  1:00 PM   Number of days: 0            Urethral Catheter 09/10/19 0732 Temperature probe 16 Fr. (Active)   Output (mL) 275 mL 9/10/2019  1:00 PM   Number of days: 0            Y Chest Tube 1 and 2 09/10/19 1148 1 Right Mediastinal 32 Fr. 2 Left Mediastinal 32 Fr. (Active)   Number of days: 0       Laboratory  CBC:   Recent Labs   Lab 09/10/19  1237   WBC 18.42*   RBC 3.17*   HGB 9.3*   HCT 29.6*      MCV 93   MCH 29.3   MCHC 31.4*     CMP:   Recent Labs   Lab 09/10/19  0536 09/10/19  1237   * 182*   CALCIUM 9.7 8.8   ALBUMIN 3.7  --    PROT 7.0  --     140   K 3.8 3.6   CO2 22* 22*    104   BUN 25* 23*   CREATININE 1.8* 1.5*   ALKPHOS 76  --    ALT 23  --    AST 33  --    BILITOT 0.5  --      LFTs:   Recent Labs   Lab 09/10/19  0536   ALT 23   AST 33   ALKPHOS 76   BILITOT 0.5   PROT 7.0   ALBUMIN 3.7     Cardiac markers: No results for input(s): CKMB, CPKMB, TROPONINT, TROPONINI, MYOGLOBIN in the last 168 hours.  ABGs:   Recent Labs   Lab 09/10/19  1344   PH 7.337*   PCO2 41.3   PO2 220*   HCO3 22.1*   POCSATURATED 100   BE -4       Chest X-Ray: I personally reviewed the films and findings are:, normal    Diagnostic Results:  Echo: Reviewed    ASSESSMENT/PLAN:   Plan:48 YOF POD#0 LVAD placement by Dr. Walden, recovering in the ICU, sedated and intubated on vasopressors in stable condition.    Neuro:   -Sedation: propofol  -Daily sedation vacations  - Pain control with fentanyl     Pulmonary:   -AC vent: rate 14, FiO2 59%, PEEP 5  -Daily SBT  -Nitric oxide gas,  10ppm,     Cardiac:  -MAP goal 70-80  - Continue dobutamine gtt, epi gtt, levo gtt, vaso gtt.    - Will wean as tolerates  - Ventricular tachyarrhythmia   - Amiodarone 200 mg PO BID   - Goal K >4 and Mg >2   - Monitor on telemetry  - Epi 0.05  - Levo DC   - 2 CT to wall suction     Renal:   -Fonseca in place  -Stricy I/Os  -Will monitor UOP   -Bun/Cr 23/1.5 (25/1.8)    Fluids/Electrolytes/Nutrition/GI:   -Nutritional status: NPO  -replace lytes PRN  -Bowel regimen    Hematology/Oncology:  -H/H  -INR 1.1  -PT 11.6  -Anticoagulation:  -Transfuse Hgb < 7    Infectious Disease:   -Afebrile  -WBC 18.42 (6.94)  -Will follow up cultures  -Abx: cefepime, rifampin, vancomycin     Endocrine:  -Continue insulin gtt  -Wean as tolerates  -Accuchecks  -Glucose goal of 120-180    Dispo:  -Continue care in the ICU setting    PPx:     DVT SCDs, holding chemical ppx for sternotomy  PPI: Protonix    Dispo:   -continue SICU care    Primary:  CTS      -Denice Meehan M.D  General Surgery PGY1

## 2019-09-10 NOTE — PLAN OF CARE
Problem: Adult Inpatient Plan of Care  Goal: Plan of Care Review  Outcome: Ongoing (interventions implemented as appropriate)  Patient remains free from falls and injuries through out shift. Patient AAO and VSS. Patient denies chest pain and SOB.   infusing as ordered. LBM 9/9.  LVAD workup completed and placement for 10/10 first case. Preop checklist in progress. Hibba cleanse done last night and one due this AM. Fresh gown, socks and new arm band in room for after. Will put latest Ht/Wt in chart. Consents verified. Advance directive in physical chart. Vitamin K X2 given. Line pulled per policy. PIV gained under US by MD. Plan of care reviewed with patient. Patient verbalizes understanding of plan.  Pt states she is ok with getting LVAD. Will continue to monitor.

## 2019-09-10 NOTE — ANESTHESIA PROCEDURE NOTES
NADINE    Diagnosis: ICD-9-CM code 425.4  Patient location during procedure: OR  Procedure start time: 9/10/2019 7:46 AM  Timeout: 9/10/2019 7:46 AM  Procedure end time: 9/10/2019 9:46 AM  Exam type: Baseline  Staffing  Anesthesiologist: Hilario Pepper Jr., MD  Performed: anesthesiologist and fellow   Preanesthetic Checklist  Completed: patient identified, surgical consent, pre-op evaluation, timeout performed, risks and benefits discussed, monitors and equipment checked, anesthesia consent given, oxygen available, suction available, hand hygiene performed and patient being monitored  Setup & Induction  Patient preparation: bite block inserted  Probe Insertion: difficult  Exam: complete      Findings  Impression  Other Findings  Pre CPB    Severely reduced LV function EF < 15%  Mild MR VC 0.28   No AI, No left ventricular thrombus  No IAS defect/no PFO  LV ROGERIO 6.2  Mild TR with annulus ~35  No pleural effusion no pericardial effusion    Post CPB  LVAD in midline position with appropriate inflow and outflow  NO IAS defect  Small right and left pleural effusions  Trace AI   Mild MR   Aorta intact, no dissection  Probe removed atraumatically     No MR  Trace AI  Moderate TR with hepatic blunting  No dissection  Probe Removal      Exam     Left Heart  Left Atruim: normal    Left Ventricle: 6.2 cm  LV Wall Thickness (posterior wall):0.8 cm    LVAD:no  Estimated Ejection Fraction: < 25% severe  Regional Wall Abnormalities: RWMA present        Left Ventricle Diastolic Function    Lateral E': 10 cm/s    Right Heart  Right Ventricle: normal  Right Ventricle Function: normal  Right Atria: cm and normal    Intra Atrial Septum  PFO: no shunt by color flow doppler  no IAS aneurysm  no lipomatous hypertrophy  no Atrial Septal Defect (Asd)    Right Ventricle  Size: normal, Free Wall Thickness: normal    Aortic Valve:  Stenosis: none  Morphology: trileaflet  Regurgitation: no aortic valve regurgitation     Mitral  Valve:  Morphology:normal  Jet Description: mild    Tricuspid Valve:  Morphology: normal  Regurgitation: mild    Pulmonic Valve:  Morphology:normal  Regurgitation(color flow): none    Great Vessels  Ascending Aorta Atherosclerosis: 2=mild dz (<3mm)  Aortic Arch Atherosclerosis: 2=mild dz (<3mm)  IABP: no  Descending Aorta Atherosclerosis: 2=mild dz (<3mm)  Aorta    Descending aorta IABP: no    Effusions  Effusions: none    Summary  Findings discussed with surgeon.    Other Findings   Pre CPB    Severely reduced LV function EF < 15%  Mild MR VC 0.28   No AI, No left ventricular thrombus  No IAS defect/no PFO  LV ROGERIO 6.2  Mild TR with annulus ~35  No pleural effusion no pericardial effusion    Post CPB  LVAD in midline position with appropriate inflow and outflow  NO IAS defect  Small right and left pleural effusions  Trace AI   Mild MR   Aorta intact, no dissection  Probe removed atraumatically     No MR  Trace AI  Moderate TR with hepatic blunting  No dissection

## 2019-09-10 NOTE — PROGRESS NOTES
09/10/2019  Perfusionist:  Christo Cooper  [facility]  Surgeon(s) and Role:     * Shar Walden MD - Primary  Anesthesiologist:  Hilario Pepper Jr., MD    Past Medical History:   Diagnosis Date    Fractures     History of ventricular fibrillation 9/4/2019    History of ventricular tachycardia 9/4/2019    Hyperlipidemia     Migraine     Osteoarthritis        Device implanted: 9/10/19    For implantable VADs  Pump SN: [id number] 6/25/22  Primary controller: [id number]  Backup controller: [facility] [id number]    At the end of the procedure, the current device settings and alarms were verified to be accurate.  The patient was transported to SICU post operatively, back-up equipment was either delivered to the patients room or verified by the bedside nurse, and report given.  There were no issues.    11:25 AM

## 2019-09-10 NOTE — ANESTHESIA PROCEDURE NOTES
Arterial    Diagnosis: heart failure    Patient location during procedure: done in OR  Procedure start time: 9/10/2019 7:10 AM  Timeout: 9/10/2019 7:10 AM  Procedure end time: 9/10/2019 7:17 AM    Staffing  Authorizing Provider: Hilario Pepper Jr., MD  Performing Provider: Frank Cerrato MD    Anesthesiologist was present at the time of the procedure.    Preanesthetic Checklist  Completed: patient identified, site marked, surgical consent, pre-op evaluation, timeout performed, IV checked, risks and benefits discussed, monitors and equipment checked and anesthesia consent givenArterial  Skin Prep: chlorhexidine gluconate and isopropyl alcohol  Local Infiltration: lidocaine  Orientation: right  Location: brachial  Catheter Size: 20 G  Catheter placement by Ultrasound guidance. Heme positive aspiration all ports.  Vessel Caliber: medium, patent, compressibility normal  Vascular Doppler:  not done  Needle advanced into vessel with real time Ultrasound guidance.  Sterile sheath used.Insertion Attempts: 1  Assessment  Dressing: secured with tape and tegaderm  Patient: Tolerated well

## 2019-09-10 NOTE — CONSULTS
"  [facility]  Adult Nutrition  Consult Note    SUMMARY     Recommendations    1. Once extubated, advance PO diet order to Cardiac as tolerated (texture per SLP).  2. If PO intake poor >48 hrs, add Optisource ONS TID to supplement intake.   3. If pt remains intubated >72 hrs, begin enteral feeds with Impact Peptide 1.5.   - Goal: 50 mL/hr to meet 100% EEN/EPN.  4. RD to monitor & follow-up.    Goals: Meet % EEN, EPN  Nutrition Goal Status: new  Communication of RD Recs: reviewed with RN    Reason for Assessment    Reason For Assessment: consult  Diagnosis: cardiac disease(LVAD/OHTx w/u)  Relevant Medical History: HF  Interdisciplinary Rounds: attended    General Information Comments: Intubated, s/p LVAD placement, pericardium graft insertion & sternum closure. At initial RD visit, pt declines any recent wt loss, confirmed by chart review (stable wt X 5+yrs). Pt appears nourished, NFPE not indicated at this time. Good appetite prior to surgery.  Nutrition Discharge Planning: Adequate nutrition    Nutrition/Diet History    Patient Reported Diet/Restrictions/Preferences: low salt  Spiritual, Cultural Beliefs, [employer]al Practices, Values that Affect Care: no  Factors Affecting Nutritional Intake: NPO, on mechanical ventilation    Anthropometrics    Temp: 98.3 °F (36.8 °C)  Height Method: Stated  Height: 5' 7" (170.2 cm)  Height (inches): 67 in  Weight Method: Standard Scale  Weight: 104.4 kg (230 lb 2.6 oz)  Weight (lb): 230.16 lb  Ideal Body Weight (IBW), Female: 135 lb  % Ideal Body Weight, Female (lb): 170.49 lb  BMI (Calculated): 36.1  BMI Grade: 35 - 39.9 - obesity - grade II  Usual Body Weight (UBW), k.5 kg  % Usual Body Weight: 99.15    Lab/Procedures/Meds    Pertinent Labs Reviewed: reviewed  Pertinent Labs Comments: BUN 23, Creat 1.5, GFR 40.6  Pertinent Medications Reviewed: reviewed  Pertinent Medications Comments: Dobutamine    Estimated/Assessed Needs    Weight Used For " Calorie Calculations: 104.4 kg (230 lb 2.6 oz)     Energy Calorie Requirements (kcal): 1870 kcal/d  Energy Need Method: [facility]     Protein Requirements: 123-154 g/d (2-2.5 g/kg IBW)  Weight Used For Protein Calculations: 61.4 kg (135 lb 5.8 oz)     Fluid Requirements (mL): Per MD    Nutrition Prescription Ordered    Current Diet Order: NPO    Evaluation of Received Nutrient/Fluid Intake    Comments: LBM: 9/9    Nutrition Risk    Level of Risk/Frequency of Follow-up: (2x/week)     Assessment and Plan    Nutrition Problem  Increased nutrient needs    Related to (etiology):   Physiological causes     Signs and Symptoms (as evidenced by):   S/p LVAD placement     Interventions/Recommendations (treatment strategy):  Collaboration of nutrition care w/ other providers     Nutrition Diagnosis Status:   New     Monitor and Evaluation    Food and Nutrient Intake: energy intake, food and beverage intake  Food and Nutrient Adminstration: diet order  Knowledge/Beliefs/Attitudes: food and nutrition knowledge/skill  Anthropometric Measurements: weight, weight change  Biochemical Data, Medical Tests and Procedures: other (specify)(All labs)  Nutrition-Focused Physical Findings: overall appearance     Nutrition Follow-Up    RD Follow-up?: Yes

## 2019-09-10 NOTE — PROCEDURES
ICD Evaluation Report     09/10/2019     : [employer]      Reason for evaluation: Tachytherapy deactivation for VAD      Initial Parameters  Mode: DDD              Changes made:    Detection:  VF: OFF  FVT: OFF  VT: OFF  Monitor: OFF     Follow up: Device Clinic     Yeyo Young MD  Cardiology Fellow (PGY-IV)  Pager: [phone]

## 2019-09-10 NOTE — ASSESSMENT & PLAN NOTE
Impression: Pt is a 49 y/o female with advanced heart failure. Pt has AICD in place. Pt to have LVAD placed tomorrow. Pt is alert, oriented to person, place, time, and situation.     Discussion conducted with the pt who reported his goals for health care for getting an LVAD is to feel better. Per pt, she has progressively gotten more fatigued and tired. Over past weeks.  The pts chief concern/fear reported pertaining to receiving an LVAD and the impact on his/her life was     The need for preparedness planning was discussed with special attention and in reference to:  a) device failure b) suboptimal QOL post LVAD procedure, c) impact on co- morbid conditions, and d) catastrophic complications such as stroke, renal failure/hemodialysis or other chronic critical illness. In particular, the following points should be noted in the event of:  1) Device failure: Pt aware this can occur that can lead to death.   2) a) Post LVAD QOL goals are: to have more energy and be able to be an active participant in her life.    b) Chronic poor QOL is defined as: not having any energy and not being able to leave her home because she is so fatigued and tires.         3)   Catastrophic Complications: Discussed with pt and her sister, Flavia.  Pt aware that head bleed, stroke, device failure, renal failure.        4)   Co-morbid conditions:  Discussed.       Pt received education yesterday from LVAD nurse. Per pt, her and her family will be going over education prior to LVAD placement.    During the discussion the pt/(caregiver) demonstrated __excellent __X_good   __marginal__poor insight/understanding concerning the risk vs benefits of obtaining an LVAD

## 2019-09-10 NOTE — CONSULTS
[facility]  Palliative Medicine  Consult Note    Patient Name: Deborah Navas  MRN: [id number]  Admission Date: 9/4/2019  Hospital Length of Stay: 6 days  Code Status: Full Code   Attending Provider: Jolene Lua MD  Consulting Provider: DEBORA Yuan  Primary Care Physician: Primary Doctor No  Principal Problem:Acute on chronic combined systolic and diastolic heart failure    Patient information was obtained from patient and ER records.      Inpatient consult to Palliative Care  Consult performed by: DEBORA Gaffney  Consult ordered by: Vi Bryant MD        Assessment/Plan:     Palliative care encounter  Impression: Pt is a 47 y/o female with advanced heart failure. Pt has AICD in place. Pt to have LVAD placed tomorrow. Pt is alert, oriented to person, place, time, and situation.     Discussion conducted with the pt who reported his goals for health care for getting an LVAD is to feel better. Per pt, she has progressively gotten more fatigued and tired. Over past weeks.  The pts chief concern/fear reported pertaining to receiving an LVAD and the impact on his/her life was     The need for preparedness planning was discussed with special attention and in reference to:  a) device failure b) suboptimal QOL post LVAD procedure, c) impact on co- morbid conditions, and d) catastrophic complications such as stroke, renal failure/hemodialysis or other chronic critical illness. In particular, the following points should be noted in the event of:  1) Device failure: Pt aware this can occur that can lead to death.   2) a) Post LVAD QOL goals are: to have more energy and be able to be an active participant in her life.    b) Chronic poor QOL is defined as: not having any energy and not being able to leave her home because she is so fatigued and tires.         3)   Catastrophic Complications: Discussed with pt and her sister, Flavia.  Pt aware that head bleed, stroke,  device failure, renal failure.        4)   Co-morbid conditions:  Discussed.       Pt received education yesterday from LVAD nurse. Per pt, her and her family will be going over education prior to LVAD placement.    During the discussion the pt/(caregiver) demonstrated __excellent __X_good   __marginal__poor insight/understanding concerning the risk vs benefits of obtaining an LVAD          Thank you for your consult. I will sign off. Please contact us if you have any additional questions.    Subjective:     HPI:   Pt is a 47 y/o female with hx NICM: EF 20%, LVEDD: 6.8cm, V-fib reported in setting of hypokalemia with appropriate shock from ICD (on Amiodarone), HLP. Per chart review, pt being admitted from procedure room for management of cardiogenic shock and workup for advanced options.  Patient initally say Dr. Zarate in clinic 8/27/19 as initial consult for advanced options.  At time, she reported feeling fine with only complaint of fatigue which was at baseline.          Per chart review, pt had a RHC today which showed: RA: 20/ 20/ 18 RV: 60/ 8/ 18 PA: 60/ 32/ 45 PWP: 43/ 70/ 40 . Cardiac output was 2.27 by Fitz. Cardiac index is 1.04 L/min/m2. O2 Sat: PA 34%. AO 95% PVR 2.2 PALMER. Pt admitted for diuresis, inotropic suppor, and work up for advanced options        Hospital Course:  No notes on file    Interval History: Pt to have LVAD placed tomorrow morning.     Past Medical History:   Diagnosis Date    Fractures     History of ventricular fibrillation 9/4/2019    History of ventricular tachycardia 9/4/2019    Hyperlipidemia     Migraine     Osteoarthritis        Past Surgical History:   Procedure Laterality Date    BACK SURGERY      2007    BONE GRAFT Left     from Left hip to Left FA    eardrum reconstruction  1980    ELBOW SURGERY Left [id number]    FOREARM FRACTURE SURGERY Bilateral [id number]    multiple surgeries    INSERTION OF IMPLANTABLE CARDIOVERTER-DEFIBRILLATOR (ICD) GENERATOR WITH TWO  EXISTING LEADS      INSERTION OF PACEMAKER Left     INSERTION, CATHETER, RIGHT HEART Right 9/4/2019    Performed by [facility]    LUMBAR FUSION  2007    L4-L5    SINUS SURGERY Right 1994    with lymph nodes    TEMPOROMANDIBULAR JOINT SURGERY Right 1988    TONSILLECTOMY      TYMPANOSTOMY TUBE PLACEMENT  1971- 1979    multiple tube placements       Review of patient's allergies indicates:   Allergen Reactions    Adhesive Blisters     Reaction to area in chest and up only    Codeine Itching       Medications:  Continuous Infusions:   DOBUTamine 5 mcg/kg/min (09/09/19 4072)     Scheduled Meds:   amiodarone  200 mg Oral BID    aspirin  81 mg Oral Daily    ceFEPime (MAXIPIME) IVPB  2 g Intravenous Once Pre-Op    docusate sodium  100 mg Oral Daily    furosemide  40 mg Intravenous BID    gabapentin  400 mg Oral TID    guaiFENesin  600 mg Oral BID    magnesium citrate  296 mL Oral Once    mupirocin   Nasal BID    rifAMpin (RIFADIN) IVPB  600 mg Irrigation Once    spironolactone  25 mg Oral Daily    venlafaxine  150 mg Oral Daily     PRN Meds:acetaminophen, bisacodyl, diphenhydrAMINE, oxyCODONE-acetaminophen, promethazine, senna, sodium chloride 0.9%, zolpidem    Family History     Problem Relation (Age of Onset)    Broken bones Maternal Grandmother    Cancer Paternal Grandmother    Dislocations Maternal Grandmother    Heart failure Paternal Grandfather, Maternal Grandfather    Migraines Mother, Maternal Grandmother, Paternal Grandmother, Paternal Grandfather, Maternal Grandfather    Obesity Paternal Grandmother    Osteoarthritis Mother, Maternal Grandmother, Paternal Grandmother, Paternal Grandfather, Maternal Grandfather, Father    Osteoporosis Maternal Grandmother    Scoliosis Maternal Grandmother    Stroke Father        Tobacco Use    Smoking status: Never Smoker    Smokeless tobacco: Never Used   Substance and Sexual Activity    Alcohol use: No    Drug use: No     Sexual activity: Not Currently       Review of Systems   Constitutional: Positive for activity change and fatigue.   Respiratory: Positive for shortness of breath.    Cardiovascular: Negative for chest pain.   Neurological: Positive for weakness.     Objective:     Vital Signs (Most Recent):  Temp: 98.3 °F (36.8 °C) (09/10/19 0412)  Pulse: 89 (09/10/19 0412)  Resp: 18 (09/10/19 0412)  BP: 117/65 (09/10/19 0417)  SpO2: 98 % (09/10/19 0412) Vital Signs (24h Range):  Temp:  [96.4 °F (35.8 °C)-98.5 °F (36.9 °C)] 98.3 °F (36.8 °C)  Pulse:  [] 89  Resp:  [16-18] 18  SpO2:  [96 %-98 %] 98 %  BP: ()/(54-66) 117/65     Weight: 104.4 kg (230 lb 2.6 oz)  Body mass index is 36.05 kg/m².    Review of Symptoms  Symptom Assessment (ESAS 0-10 scale)   ESAS 0 1 2 3 4 5 6 7 8 9 10   Pain X             Dyspnea              Anxiety              Nausea              Depression               Anorexia              Fatigue      X        Insomnia              Restlessness               Agitation              CAM / Delirium __ --  ___+   Constipation     __ --  ___+   Diarrhea           __ --  ___+  Bowel Management Plan (BMP): No    Pain Assessment: No pain noted    Performance Status: 60    ECOG Performance Status Grade: 2 - Ambulates, capable of self care only    Physical Exam   Constitutional: She is oriented to person, place, and time. She is cooperative.   HENT:   Head: Normocephalic and atraumatic.   Pulmonary/Chest: Effort normal.   Neurological: She is alert and oriented to person, place, and time.   Skin: Skin is warm and dry.   Psychiatric: She has a normal mood and affect. Her speech is normal and behavior is normal.       Significant Labs: All pertinent labs within the past 24 hours have been reviewed.  CBC:   Recent Labs   Lab 09/10/19  0536   WBC 6.77   HGB 12.2   HCT 38.7   MCV 91        BMP:  No results for input(s): GLU, NA, K, CL, CO2, BUN, CREATININE, CALCIUM, MG in the last 24 hours.  LFT:  Lab Results    Component Value Date    AST 22 09/09/2019    ALKPHOS 74 09/09/2019    BILITOT 0.6 09/09/2019     Albumin:   Albumin   Date Value Ref Range Status   09/09/2019 3.5 3.5 - 5.2 g/dL Final     Protein:   Total Protein   Date Value Ref Range Status   09/09/2019 6.7 6.0 - 8.4 g/dL Final     Lactic acid:   No results found for: LACTATE    Significant Imaging: I have reviewed all pertinent imaging results/findings within the past 24 hours.    Advance Care Planning   Advanced Directives::  Living Will: No  LaPOST: No  Do Not Resuscitate Status: No  Medical Power of : Completed with pt. Pt want her mother, Cristal to be first MPOA.     Decision-Making Capacity: Patient answered questions, Family answered questions       Living Arrangements: Lives with family. Pt will move in with sister in [place].     Psychosocial/Cultural:  Pt lives by herself. Pt has a mother who lives in [place] and a sisiter who lives in [place]. Pt livesmin [place].  Her caregiver will be her sister, Flavia who pt will move in with in [place] after LVAD surgery.     Spiritual: no    F- Julita and Belief:n/a    I - Importance: no  .  C - Community: no    A - Address in Care: Pt open to  visits for family.       > 50% of 70 min visit spent in chart review, face to face discussion of goals of care,  symptom assessment, coordination of care and emotional support.    Bobbi Salter, CNS  Palliative Medicine  [facility]

## 2019-09-10 NOTE — TRANSFER OF CARE
"Anesthesia Transfer of Care Note    Patient: Deborah Navas    Procedure(s) Performed: Procedure(s) (LRB):  INSERTION-LEFT VENTRICULAR ASSIST DEVICE (N/A)  INSERTION, GRAFT, PERICARDIUM  CLOSURE, WOUND, STERNUM    Patient location: ICU    Anesthesia Type: general    Transport from OR: Continuous ECG monitoring in transport. Continuous SpO2 monitoring in transport. Continuos invasive BP monitoring in transport. Transported from OR intubated on 100% O2 by AMBU with adequate controlled ventilation. Upon arrival to PACU/ICU, patient attached to ventilator and auscultated to confirm bilateral breath sounds and adequate TV    Post pain: adequate analgesia    Post assessment: no apparent anesthetic complications and tolerated procedure well    Post vital signs: stable    Level of consciousness: sedated    Nausea/Vomiting: no nausea/vomiting    Complications: none    Transfer of care protocol was followed      Last vitals:   Visit Vitals  /65 (BP Location: Left arm, Patient Position: Lying)   Pulse 80   Temp 36.8 °C (98.3 °F) (Oral)   Resp 12   Ht 5' 7" (1.702 m)   Wt 104.4 kg (230 lb 2.6 oz)   LMP  (Within Years)   SpO2 97%   Breastfeeding? No   BMI 36.05 kg/m²     "

## 2019-09-10 NOTE — ANESTHESIA PROCEDURE NOTES
[place] [facility]    Diagnosis: heart failure  Patient location during procedure: done in OR  Procedure start time: 9/10/2019 7:35 AM  Timeout: 9/10/2019 7:30 AM  Procedure end time: 9/10/2019 8:00 AM    Staffing  Authorizing Provider: Hilario Pepper Jr., MD  Performing Provider: Frank Cerrato MD    Anesthesiologist was present at the time of the procedure.  Preanesthetic Checklist  Completed: patient identified, site marked, surgical consent, pre-op evaluation, timeout performed, IV checked, risks and benefits discussed, monitors and equipment checked and anesthesia consent given  [place]  Skin Prep: chlorhexidine gluconate and isopropyl alcohol  Local Infiltration: none  Location: right,  internal jugular vein  Vessel Caliber: medium, patent, compressibility normal  Vascular Doppler:  not done  Coaxial introducer size: 9 Fr 2 lumen. manometry used.  Device: CCO/Oximetric Catheter  Catheter Size: 8 Fr  Catheter placement by yes. Heme positive aspiration all ports. PAC floated with balloon up not wedgedSterile sheath used  Locked at: 53 cm.Insertion Attempts: 1  Indication: intravenous therapy, hemodynamic monitoring  Ultrasound Guidance  Needle advanced into vessel with real time Ultrasound guidance.  Guidewire confirmed in vessel.  Sterile sheath used.  Assessment  Central Line Bundle Protocol followed. Hand hygiene before procedure, surgical cap worn, surgical mask worn, sterile surgical gloves worn, large sterile drape used.  Verification: ultrasound, blood return and pulsatile blood flow  Dressing: sutured in place and taped  Patient: Tolerated Well

## 2019-09-11 LAB
ALBUMIN SERPL BCP-MCNC: 3.8 G/DL (ref 3.5–5.2)
ALBUMIN SERPL BCP-MCNC: 4.1 G/DL (ref 3.5–5.2)
ALLENS TEST: ABNORMAL
ALLENS TEST: NORMAL
ALLENS TEST: NORMAL
ALP SERPL-CCNC: 57 U/L (ref 55–135)
ALP SERPL-CCNC: 62 U/L (ref 55–135)
ALT SERPL W/O P-5'-P-CCNC: 29 U/L (ref 10–44)
ALT SERPL W/O P-5'-P-CCNC: 29 U/L (ref 10–44)
ANION GAP SERPL CALC-SCNC: 10 MMOL/L (ref 8–16)
ANION GAP SERPL CALC-SCNC: 11 MMOL/L (ref 8–16)
ANION GAP SERPL CALC-SCNC: 16 MMOL/L (ref 8–16)
ANION GAP SERPL CALC-SCNC: 16 MMOL/L (ref 8–16)
ANISOCYTOSIS BLD QL SMEAR: SLIGHT
APTT BLDCRRT: 22.2 SEC (ref 21–32)
APTT BLDCRRT: 23.1 SEC (ref 21–32)
APTT BLDCRRT: 24.6 SEC (ref 21–32)
APTT BLDCRRT: 26 SEC (ref 21–32)
APTT BLDCRRT: 30.1 SEC (ref 21–32)
AST SERPL-CCNC: 102 U/L (ref 10–40)
AST SERPL-CCNC: 114 U/L (ref 10–40)
BASOPHILS # BLD AUTO: 0.01 K/UL (ref 0–0.2)
BASOPHILS # BLD AUTO: 0.01 K/UL (ref 0–0.2)
BASOPHILS # BLD AUTO: 0.02 K/UL (ref 0–0.2)
BASOPHILS # BLD AUTO: 0.02 K/UL (ref 0–0.2)
BASOPHILS NFR BLD: 0.1 % (ref 0–1.9)
BASOPHILS NFR BLD: 0.1 % (ref 0–1.9)
BASOPHILS NFR BLD: 0.2 % (ref 0–1.9)
BASOPHILS NFR BLD: 0.2 % (ref 0–1.9)
BILIRUB DIRECT SERPL-MCNC: 1.3 MG/DL (ref 0.1–0.3)
BILIRUB SERPL-MCNC: 1.6 MG/DL (ref 0.1–1)
BILIRUB SERPL-MCNC: 1.8 MG/DL (ref 0.1–1)
BNP SERPL-MCNC: 976 PG/ML (ref 0–99)
BUN SERPL-MCNC: 19 MG/DL (ref 6–20)
BUN SERPL-MCNC: 20 MG/DL (ref 6–20)
BUN SERPL-MCNC: 22 MG/DL (ref 6–20)
BUN SERPL-MCNC: 22 MG/DL (ref 6–20)
CALCIUM SERPL-MCNC: 9 MG/DL (ref 8.7–10.5)
CALCIUM SERPL-MCNC: 9 MG/DL (ref 8.7–10.5)
CALCIUM SERPL-MCNC: 9.1 MG/DL (ref 8.7–10.5)
CALCIUM SERPL-MCNC: 9.2 MG/DL (ref 8.7–10.5)
CHLORIDE SERPL-SCNC: 100 MMOL/L (ref 95–110)
CHLORIDE SERPL-SCNC: 103 MMOL/L (ref 95–110)
CHLORIDE SERPL-SCNC: 104 MMOL/L (ref 95–110)
CHLORIDE SERPL-SCNC: 104 MMOL/L (ref 95–110)
CO2 SERPL-SCNC: 20 MMOL/L (ref 23–29)
CO2 SERPL-SCNC: 20 MMOL/L (ref 23–29)
CO2 SERPL-SCNC: 21 MMOL/L (ref 23–29)
CO2 SERPL-SCNC: 25 MMOL/L (ref 23–29)
CREAT SERPL-MCNC: 1.4 MG/DL (ref 0.5–1.4)
CREAT SERPL-MCNC: 1.5 MG/DL (ref 0.5–1.4)
CRP SERPL-MCNC: 94.4 MG/L (ref 0–8.2)
DELSYS: ABNORMAL
DELSYS: NORMAL
DELSYS: NORMAL
DIFFERENTIAL METHOD: ABNORMAL
EOSINOPHIL # BLD AUTO: 0 K/UL (ref 0–0.5)
EOSINOPHIL NFR BLD: 0.1 % (ref 0–8)
EOSINOPHIL NFR BLD: 0.2 % (ref 0–8)
ERYTHROCYTE [DISTWIDTH] IN BLOOD BY AUTOMATED COUNT: 14.9 % (ref 11.5–14.5)
ERYTHROCYTE [DISTWIDTH] IN BLOOD BY AUTOMATED COUNT: 15 % (ref 11.5–14.5)
ERYTHROCYTE [DISTWIDTH] IN BLOOD BY AUTOMATED COUNT: 15 % (ref 11.5–14.5)
ERYTHROCYTE [DISTWIDTH] IN BLOOD BY AUTOMATED COUNT: 15.1 % (ref 11.5–14.5)
ERYTHROCYTE [SEDIMENTATION RATE] IN BLOOD BY WESTERGREN METHOD: 12 MM/H
ERYTHROCYTE [SEDIMENTATION RATE] IN BLOOD BY WESTERGREN METHOD: 12 MM/H
ERYTHROCYTE [SEDIMENTATION RATE] IN BLOOD BY WESTERGREN METHOD: 16 MM/H
EST. GFR  (AFRICAN AMERICAN): 46.8 ML/MIN/1.73 M^2
EST. GFR  (AFRICAN AMERICAN): 50.9 ML/MIN/1.73 M^2
EST. GFR  (NON AFRICAN AMERICAN): 40.6 ML/MIN/1.73 M^2
EST. GFR  (NON AFRICAN AMERICAN): 44.1 ML/MIN/1.73 M^2
FIO2: 20
FIO2: 32
FIO2: 40
FLOW: 3
FLOW: 5
GLUCOSE SERPL-MCNC: 156 MG/DL (ref 70–110)
GLUCOSE SERPL-MCNC: 157 MG/DL (ref 70–110)
HCO3 UR-SCNC: 25.6 MMOL/L (ref 24–28)
HCO3 UR-SCNC: 25.7 MMOL/L (ref 24–28)
HCO3 UR-SCNC: 27.9 MMOL/L (ref 24–28)
HCO3 UR-SCNC: 28.3 MMOL/L (ref 24–28)
HCT VFR BLD AUTO: 31.1 % (ref 37–48.5)
HCT VFR BLD AUTO: 32.5 % (ref 37–48.5)
HCT VFR BLD AUTO: 32.5 % (ref 37–48.5)
HCT VFR BLD AUTO: 32.9 % (ref 37–48.5)
HCT VFR BLD CALC: 28 %PCV (ref 36–54)
HCT VFR BLD CALC: 33 %PCV (ref 36–54)
HGB BLD-MCNC: 10 G/DL (ref 12–16)
HGB BLD-MCNC: 9.4 G/DL (ref 12–16)
HGB BLD-MCNC: 9.8 G/DL (ref 12–16)
HGB BLD-MCNC: 9.8 G/DL (ref 12–16)
HYPOCHROMIA BLD QL SMEAR: ABNORMAL
IMM GRANULOCYTES # BLD AUTO: 0.04 K/UL (ref 0–0.04)
IMM GRANULOCYTES # BLD AUTO: 0.07 K/UL (ref 0–0.04)
IMM GRANULOCYTES # BLD AUTO: 0.07 K/UL (ref 0–0.04)
IMM GRANULOCYTES # BLD AUTO: 0.08 K/UL (ref 0–0.04)
IMM GRANULOCYTES NFR BLD AUTO: 0.4 % (ref 0–0.5)
IMM GRANULOCYTES NFR BLD AUTO: 0.6 % (ref 0–0.5)
INR PPP: 1 (ref 0.8–1.2)
INR PPP: 1 (ref 0.8–1.2)
INR PPP: 1.1 (ref 0.8–1.2)
LDH SERPL L TO P-CCNC: 0.92 MMOL/L (ref 0.36–1.25)
LDH SERPL L TO P-CCNC: 1.1 MMOL/L (ref 0.36–1.25)
LDH SERPL L TO P-CCNC: 1.13 MMOL/L (ref 0.36–1.25)
LDH SERPL L TO P-CCNC: 525 U/L (ref 110–260)
LYMPHOCYTES # BLD AUTO: 0.5 K/UL (ref 1–4.8)
LYMPHOCYTES # BLD AUTO: 0.6 K/UL (ref 1–4.8)
LYMPHOCYTES NFR BLD: 3.8 % (ref 18–48)
LYMPHOCYTES NFR BLD: 3.9 % (ref 18–48)
LYMPHOCYTES NFR BLD: 3.9 % (ref 18–48)
LYMPHOCYTES NFR BLD: 4.7 % (ref 18–48)
M TB IFN-G CD4+ BCKGRND COR BLD-ACNC: 0 IU/ML
M TB IFN-G CD4+ BCKGRND COR BLD-ACNC: 0 IU/ML
MAGNESIUM SERPL-MCNC: 2.3 MG/DL (ref 1.6–2.6)
MAGNESIUM SERPL-MCNC: 2.4 MG/DL (ref 1.6–2.6)
MAGNESIUM SERPL-MCNC: 2.4 MG/DL (ref 1.6–2.6)
MAGNESIUM SERPL-MCNC: 2.7 MG/DL (ref 1.6–2.6)
MCH RBC QN AUTO: 29.1 PG (ref 27–31)
MCH RBC QN AUTO: 29.1 PG (ref 27–31)
MCH RBC QN AUTO: 29.2 PG (ref 27–31)
MCH RBC QN AUTO: 29.3 PG (ref 27–31)
MCHC RBC AUTO-ENTMCNC: 30.2 G/DL (ref 32–36)
MCHC RBC AUTO-ENTMCNC: 30.4 G/DL (ref 32–36)
MCV RBC AUTO: 96 FL (ref 82–98)
MCV RBC AUTO: 97 FL (ref 82–98)
METHEMOGLOBIN: 0.7 % (ref 0–3)
MITOGEN IGNF BCKGRD COR BLD-ACNC: 6.34 IU/ML
MITOGEN IGNF BCKGRD COR BLD-ACNC: >10 IU/ML
MITOGEN IGNF BCKGRD COR BLD-ACNC: NEGATIVE [IU]/ML
MITOGEN IGNF BCKGRD COR BLD-ACNC: NEGATIVE [IU]/ML
MODE: ABNORMAL
MODE: NORMAL
MODE: NORMAL
MONOCYTES # BLD AUTO: 0.7 K/UL (ref 0.3–1)
MONOCYTES # BLD AUTO: 0.8 K/UL (ref 0.3–1)
MONOCYTES # BLD AUTO: 0.8 K/UL (ref 0.3–1)
MONOCYTES # BLD AUTO: 1 K/UL (ref 0.3–1)
MONOCYTES NFR BLD: 6.1 % (ref 4–15)
MONOCYTES NFR BLD: 6.1 % (ref 4–15)
MONOCYTES NFR BLD: 6.4 % (ref 4–15)
MONOCYTES NFR BLD: 6.8 % (ref 4–15)
NEUTROPHILS # BLD AUTO: 11.1 K/UL (ref 1.8–7.7)
NEUTROPHILS # BLD AUTO: 11.1 K/UL (ref 1.8–7.7)
NEUTROPHILS # BLD AUTO: 12.7 K/UL (ref 1.8–7.7)
NEUTROPHILS # BLD AUTO: 9.5 K/UL (ref 1.8–7.7)
NEUTROPHILS NFR BLD: 88.2 % (ref 38–73)
NEUTROPHILS NFR BLD: 88.6 % (ref 38–73)
NEUTROPHILS NFR BLD: 89.1 % (ref 38–73)
NEUTROPHILS NFR BLD: 89.1 % (ref 38–73)
NIL: 0.02 IU/ML
NIL: 0.04 IU/ML
NRBC BLD-RTO: 0 /100 WBC
OVALOCYTES BLD QL SMEAR: ABNORMAL
PCO2 BLDA: 47.5 MMHG (ref 35–45)
PCO2 BLDA: 48.8 MMHG (ref 35–45)
PCO2 BLDA: 54.7 MMHG (ref 35–45)
PCO2 BLDA: 57 MMHG (ref 35–45)
PH SMN: 7.3 [PH] (ref 7.35–7.45)
PH SMN: 7.32 [PH] (ref 7.35–7.45)
PH SMN: 7.33 [PH] (ref 7.35–7.45)
PH SMN: 7.34 [PH] (ref 7.35–7.45)
PHOSPHATE SERPL-MCNC: 4.5 MG/DL (ref 2.7–4.5)
PHOSPHATE SERPL-MCNC: 4.5 MG/DL (ref 2.7–4.5)
PHOSPHATE SERPL-MCNC: 4.9 MG/DL (ref 2.7–4.5)
PHOSPHATE SERPL-MCNC: 5.1 MG/DL (ref 2.7–4.5)
PLATELET # BLD AUTO: 136 K/UL (ref 150–350)
PLATELET # BLD AUTO: 140 K/UL (ref 150–350)
PLATELET # BLD AUTO: 144 K/UL (ref 150–350)
PLATELET # BLD AUTO: 144 K/UL (ref 150–350)
PMV BLD AUTO: 10 FL (ref 9.2–12.9)
PMV BLD AUTO: 9.8 FL (ref 9.2–12.9)
PO2 BLDA: 109 MMHG (ref 80–100)
PO2 BLDA: 111 MMHG (ref 80–100)
PO2 BLDA: 32 MMHG (ref 40–60)
PO2 BLDA: 76 MMHG (ref 80–100)
POC BE: 0 MMOL/L
POC BE: 0 MMOL/L
POC BE: 1 MMOL/L
POC BE: 2 MMOL/L
POC IONIZED CALCIUM: 1.15 MMOL/L (ref 1.06–1.42)
POC IONIZED CALCIUM: 1.18 MMOL/L (ref 1.06–1.42)
POC SATURATED O2: 53 % (ref 95–100)
POC SATURATED O2: 94 % (ref 95–100)
POC SATURATED O2: 98 % (ref 95–100)
POC SATURATED O2: 98 % (ref 95–100)
POC TCO2: 27 MMOL/L (ref 23–27)
POC TCO2: 27 MMOL/L (ref 23–27)
POC TCO2: 30 MMOL/L (ref 23–27)
POC TCO2: 30 MMOL/L (ref 24–29)
POCT GLUCOSE: 126 MG/DL (ref 70–110)
POCT GLUCOSE: 130 MG/DL (ref 70–110)
POCT GLUCOSE: 131 MG/DL (ref 70–110)
POCT GLUCOSE: 135 MG/DL (ref 70–110)
POCT GLUCOSE: 142 MG/DL (ref 70–110)
POCT GLUCOSE: 144 MG/DL (ref 70–110)
POCT GLUCOSE: 147 MG/DL (ref 70–110)
POCT GLUCOSE: 151 MG/DL (ref 70–110)
POCT GLUCOSE: 154 MG/DL (ref 70–110)
POCT GLUCOSE: 156 MG/DL (ref 70–110)
POCT GLUCOSE: 157 MG/DL (ref 70–110)
POCT GLUCOSE: 163 MG/DL (ref 70–110)
POIKILOCYTOSIS BLD QL SMEAR: SLIGHT
POLYCHROMASIA BLD QL SMEAR: ABNORMAL
POTASSIUM BLD-SCNC: 4 MMOL/L (ref 3.5–5.1)
POTASSIUM BLD-SCNC: 4.3 MMOL/L (ref 3.5–5.1)
POTASSIUM SERPL-SCNC: 4.4 MMOL/L (ref 3.5–5.1)
POTASSIUM SERPL-SCNC: 4.5 MMOL/L (ref 3.5–5.1)
POTASSIUM SERPL-SCNC: 4.5 MMOL/L (ref 3.5–5.1)
POTASSIUM SERPL-SCNC: 4.6 MMOL/L (ref 3.5–5.1)
PREALB SERPL-MCNC: 22 MG/DL (ref 20–43)
PROT SERPL-MCNC: 6.5 G/DL (ref 6–8.4)
PROT SERPL-MCNC: 6.8 G/DL (ref 6–8.4)
PROTHROMBIN TIME: 10.4 SEC (ref 9–12.5)
PROTHROMBIN TIME: 10.5 SEC (ref 9–12.5)
PROTHROMBIN TIME: 10.9 SEC (ref 9–12.5)
RBC # BLD AUTO: 3.21 M/UL (ref 4–5.4)
RBC # BLD AUTO: 3.37 M/UL (ref 4–5.4)
RBC # BLD AUTO: 3.37 M/UL (ref 4–5.4)
RBC # BLD AUTO: 3.43 M/UL (ref 4–5.4)
SAMPLE: ABNORMAL
SAMPLE: NORMAL
SAMPLE: NORMAL
SITE: ABNORMAL
SITE: NORMAL
SITE: NORMAL
SODIUM BLD-SCNC: 140 MMOL/L (ref 136–145)
SODIUM BLD-SCNC: 141 MMOL/L (ref 136–145)
SODIUM SERPL-SCNC: 135 MMOL/L (ref 136–145)
SODIUM SERPL-SCNC: 135 MMOL/L (ref 136–145)
SODIUM SERPL-SCNC: 140 MMOL/L (ref 136–145)
SODIUM SERPL-SCNC: 140 MMOL/L (ref 136–145)
SP02: 96
SP02: 98
SP02: 99
T GONDII IGG SER QL IA: REACTIVE
T GONDII IGG SERPL IA-ACNC: 77.7 IU/ML (ref 0–6.4)
TB2 - NIL: 0.01 IU/ML
TB2 - NIL: 0.02 IU/ML
VANCOMYCIN SERPL-MCNC: 9.5 UG/ML
WBC # BLD AUTO: 10.73 K/UL (ref 3.9–12.7)
WBC # BLD AUTO: 12.46 K/UL (ref 3.9–12.7)
WBC # BLD AUTO: 12.46 K/UL (ref 3.9–12.7)
WBC # BLD AUTO: 14.33 K/UL (ref 3.9–12.7)

## 2019-09-11 PROCEDURE — 86140 C-REACTIVE PROTEIN: CPT | Mod: NTX

## 2019-09-11 PROCEDURE — 85730 THROMBOPLASTIN TIME PARTIAL: CPT | Mod: 91,NTX

## 2019-09-11 PROCEDURE — 85014 HEMATOCRIT: CPT | Mod: NTX

## 2019-09-11 PROCEDURE — 80202 ASSAY OF VANCOMYCIN: CPT | Mod: NTX

## 2019-09-11 PROCEDURE — 83880 ASSAY OF NATRIURETIC PEPTIDE: CPT | Mod: NTX

## 2019-09-11 PROCEDURE — 27000221 HC OXYGEN, UP TO 24 HOURS: Mod: NTX

## 2019-09-11 PROCEDURE — 84100 ASSAY OF PHOSPHORUS: CPT | Mod: 91,NTX

## 2019-09-11 PROCEDURE — 63600175 PHARM REV CODE 636 W HCPCS: Mod: NTX | Performed by: STUDENT IN AN ORGANIZED HEALTH CARE EDUCATION/TRAINING PROGRAM

## 2019-09-11 PROCEDURE — 83735 ASSAY OF MAGNESIUM: CPT | Mod: 91,NTX

## 2019-09-11 PROCEDURE — 63600175 PHARM REV CODE 636 W HCPCS: Mod: NTX | Performed by: SURGERY

## 2019-09-11 PROCEDURE — 99232 SBSQ HOSP IP/OBS MODERATE 35: CPT | Mod: NTX,,, | Performed by: INTERNAL MEDICINE

## 2019-09-11 PROCEDURE — 80076 HEPATIC FUNCTION PANEL: CPT | Mod: NTX

## 2019-09-11 PROCEDURE — 99232 PR SUBSEQUENT HOSPITAL CARE,LEVL II: ICD-10-PCS | Mod: NTX,,, | Performed by: INTERNAL MEDICINE

## 2019-09-11 PROCEDURE — 37799 UNLISTED PX VASCULAR SURGERY: CPT | Mod: NTX

## 2019-09-11 PROCEDURE — 80053 COMPREHEN METABOLIC PANEL: CPT | Mod: NTX

## 2019-09-11 PROCEDURE — 84100 ASSAY OF PHOSPHORUS: CPT | Mod: NTX

## 2019-09-11 PROCEDURE — 97165 OT EVAL LOW COMPLEX 30 MIN: CPT | Mod: NTX

## 2019-09-11 PROCEDURE — 99232 SBSQ HOSP IP/OBS MODERATE 35: CPT | Mod: NTX,,, | Performed by: NURSE PRACTITIONER

## 2019-09-11 PROCEDURE — 87040 BLOOD CULTURE FOR BACTERIA: CPT | Mod: 59,NTX

## 2019-09-11 PROCEDURE — 82803 BLOOD GASES ANY COMBINATION: CPT | Mod: NTX

## 2019-09-11 PROCEDURE — 63600175 PHARM REV CODE 636 W HCPCS: Mod: NTX | Performed by: THORACIC SURGERY (CARDIOTHORACIC VASCULAR SURGERY)

## 2019-09-11 PROCEDURE — 25000003 PHARM REV CODE 250: Mod: NTX | Performed by: STUDENT IN AN ORGANIZED HEALTH CARE EDUCATION/TRAINING PROGRAM

## 2019-09-11 PROCEDURE — 94761 N-INVAS EAR/PLS OXIMETRY MLT: CPT | Mod: NTX

## 2019-09-11 PROCEDURE — 27000248 HC VAD-ADDITIONAL DAY: Mod: NTX

## 2019-09-11 PROCEDURE — C9113 INJ PANTOPRAZOLE SODIUM, VIA: HCPCS | Mod: NTX | Performed by: STUDENT IN AN ORGANIZED HEALTH CARE EDUCATION/TRAINING PROGRAM

## 2019-09-11 PROCEDURE — 84295 ASSAY OF SERUM SODIUM: CPT | Mod: NTX

## 2019-09-11 PROCEDURE — 85610 PROTHROMBIN TIME: CPT | Mod: 91,NTX

## 2019-09-11 PROCEDURE — 85025 COMPLETE CBC W/AUTO DIFF WBC: CPT | Mod: 91,NTX

## 2019-09-11 PROCEDURE — 99232 PR SUBSEQUENT HOSPITAL CARE,LEVL II: ICD-10-PCS | Mod: NTX,,, | Performed by: NURSE PRACTITIONER

## 2019-09-11 PROCEDURE — 84134 ASSAY OF PREALBUMIN: CPT | Mod: NTX

## 2019-09-11 PROCEDURE — 94770 HC EXHALED C02 TEST: CPT | Mod: NTX

## 2019-09-11 PROCEDURE — 63600175 PHARM REV CODE 636 W HCPCS: Mod: JG,NTX | Performed by: THORACIC SURGERY (CARDIOTHORACIC VASCULAR SURGERY)

## 2019-09-11 PROCEDURE — 80048 BASIC METABOLIC PNL TOTAL CA: CPT | Mod: 91,NTX

## 2019-09-11 PROCEDURE — 83615 LACTATE (LD) (LDH) ENZYME: CPT | Mod: NTX

## 2019-09-11 PROCEDURE — 99900035 HC TECH TIME PER 15 MIN (STAT): Mod: NTX

## 2019-09-11 PROCEDURE — 87624 HPV HI-RISK TYP POOLED RSLT: CPT | Mod: NTX

## 2019-09-11 PROCEDURE — 82330 ASSAY OF CALCIUM: CPT | Mod: NTX

## 2019-09-11 PROCEDURE — 93750 INTERROGATION VAD IN PERSON: CPT | Mod: NTX,,, | Performed by: INTERNAL MEDICINE

## 2019-09-11 PROCEDURE — 83050 HGB METHEMOGLOBIN QUAN: CPT | Mod: NTX

## 2019-09-11 PROCEDURE — 63600367 HC NITRIC OXIDE PER HOUR: Mod: NTX

## 2019-09-11 PROCEDURE — 97164 PT RE-EVAL EST PLAN CARE: CPT | Mod: NTX

## 2019-09-11 PROCEDURE — 92610 EVALUATE SWALLOWING FUNCTION: CPT | Mod: NTX

## 2019-09-11 PROCEDURE — P9045 ALBUMIN (HUMAN), 5%, 250 ML: HCPCS | Mod: JG,NTX | Performed by: THORACIC SURGERY (CARDIOTHORACIC VASCULAR SURGERY)

## 2019-09-11 PROCEDURE — 93750 PR INTERROGATE VENT ASSIST DEV, IN PERSON, W PHYSICIAN ANALYSIS: ICD-10-PCS | Mod: NTX,,, | Performed by: INTERNAL MEDICINE

## 2019-09-11 PROCEDURE — 97530 THERAPEUTIC ACTIVITIES: CPT | Mod: NTX

## 2019-09-11 PROCEDURE — 84132 ASSAY OF SERUM POTASSIUM: CPT | Mod: NTX

## 2019-09-11 PROCEDURE — 36415 COLL VENOUS BLD VENIPUNCTURE: CPT | Mod: NTX

## 2019-09-11 PROCEDURE — 83605 ASSAY OF LACTIC ACID: CPT | Mod: NTX

## 2019-09-11 PROCEDURE — 20000000 HC ICU ROOM: Mod: NTX

## 2019-09-11 PROCEDURE — 25000003 PHARM REV CODE 250: Mod: NTX | Performed by: SURGERY

## 2019-09-11 RX ORDER — HYDRALAZINE HYDROCHLORIDE 20 MG/ML
10 INJECTION INTRAMUSCULAR; INTRAVENOUS EVERY 6 HOURS PRN
Status: DISCONTINUED | OUTPATIENT
Start: 2019-09-11 | End: 2019-09-13

## 2019-09-11 RX ORDER — VENLAFAXINE 37.5 MG/1
150 TABLET ORAL DAILY
Status: DISCONTINUED | OUTPATIENT
Start: 2019-09-12 | End: 2019-09-11

## 2019-09-11 RX ORDER — DIPHENHYDRAMINE HCL 25 MG
25 CAPSULE ORAL EVERY 6 HOURS PRN
Status: DISCONTINUED | OUTPATIENT
Start: 2019-09-11 | End: 2019-09-11

## 2019-09-11 RX ORDER — ALBUMIN HUMAN 50 G/1000ML
25 SOLUTION INTRAVENOUS ONCE
Status: COMPLETED | OUTPATIENT
Start: 2019-09-11 | End: 2019-09-11

## 2019-09-11 RX ORDER — HEPARIN SODIUM 10000 [USP'U]/100ML
200 INJECTION, SOLUTION INTRAVENOUS CONTINUOUS
Status: DISCONTINUED | OUTPATIENT
Start: 2019-09-11 | End: 2019-09-12

## 2019-09-11 RX ORDER — INSULIN ASPART 100 [IU]/ML
0-5 INJECTION, SOLUTION INTRAVENOUS; SUBCUTANEOUS EVERY 4 HOURS PRN
Status: DISCONTINUED | OUTPATIENT
Start: 2019-09-11 | End: 2019-09-12

## 2019-09-11 RX ORDER — ALBUMIN HUMAN 50 G/1000ML
25 SOLUTION INTRAVENOUS ONCE
Status: DISCONTINUED | OUTPATIENT
Start: 2019-09-11 | End: 2019-09-11

## 2019-09-11 RX ORDER — VENLAFAXINE 37.5 MG/1
150 TABLET ORAL DAILY
Status: DISCONTINUED | OUTPATIENT
Start: 2019-09-11 | End: 2019-10-01 | Stop reason: HOSPADM

## 2019-09-11 RX ORDER — GLUCAGON 1 MG
1 KIT INJECTION
Status: DISCONTINUED | OUTPATIENT
Start: 2019-09-11 | End: 2019-09-12

## 2019-09-11 RX ORDER — ACETAMINOPHEN 10 MG/ML
1000 INJECTION, SOLUTION INTRAVENOUS EVERY 8 HOURS
Status: COMPLETED | OUTPATIENT
Start: 2019-09-11 | End: 2019-09-11

## 2019-09-11 RX ORDER — WARFARIN 2 MG/1
2 TABLET ORAL ONCE
Status: COMPLETED | OUTPATIENT
Start: 2019-09-11 | End: 2019-09-11

## 2019-09-11 RX ORDER — HYDROXYZINE HYDROCHLORIDE 25 MG/1
25 TABLET, FILM COATED ORAL 3 TIMES DAILY PRN
Status: DISCONTINUED | OUTPATIENT
Start: 2019-09-11 | End: 2019-10-01 | Stop reason: HOSPADM

## 2019-09-11 RX ADMIN — ACETAMINOPHEN 1000 MG: 10 INJECTION, SOLUTION INTRAVENOUS at 03:09

## 2019-09-11 RX ADMIN — ALBUMIN (HUMAN) 25 G: 12.5 SOLUTION INTRAVENOUS at 06:09

## 2019-09-11 RX ADMIN — CEFEPIME 2 G: 2 INJECTION, POWDER, FOR SOLUTION INTRAVENOUS at 09:09

## 2019-09-11 RX ADMIN — ASPIRIN 325 MG: 325 TABLET, COATED ORAL at 12:09

## 2019-09-11 RX ADMIN — MUPIROCIN: 20 OINTMENT TOPICAL at 09:09

## 2019-09-11 RX ADMIN — ALBUMIN (HUMAN) 25 G: 12.5 SOLUTION INTRAVENOUS at 12:09

## 2019-09-11 RX ADMIN — VANCOMYCIN HYDROCHLORIDE 1500 MG: 1.5 INJECTION, POWDER, LYOPHILIZED, FOR SOLUTION INTRAVENOUS at 09:09

## 2019-09-11 RX ADMIN — ALBUMIN (HUMAN) 25 G: 12.5 SOLUTION INTRAVENOUS at 09:09

## 2019-09-11 RX ADMIN — OXYCODONE HYDROCHLORIDE 10 MG: 10 TABLET ORAL at 09:09

## 2019-09-11 RX ADMIN — HYDROXYZINE HYDROCHLORIDE 25 MG: 25 TABLET, FILM COATED ORAL at 07:09

## 2019-09-11 RX ADMIN — ACETAMINOPHEN 1000 MG: 10 INJECTION, SOLUTION INTRAVENOUS at 07:09

## 2019-09-11 RX ADMIN — OXYCODONE HYDROCHLORIDE 5 MG: 5 TABLET ORAL at 12:09

## 2019-09-11 RX ADMIN — EPINEPHRINE 0.02 MCG/KG/MIN: 1 INJECTION INTRAMUSCULAR; INTRAVENOUS; SUBCUTANEOUS at 06:09

## 2019-09-11 RX ADMIN — FUROSEMIDE 7.5 MG/HR: 10 INJECTION, SOLUTION INTRAMUSCULAR; INTRAVENOUS at 09:09

## 2019-09-11 RX ADMIN — OXYCODONE HYDROCHLORIDE 5 MG: 5 TABLET ORAL at 04:09

## 2019-09-11 RX ADMIN — WARFARIN SODIUM 2 MG: 2 TABLET ORAL at 04:09

## 2019-09-11 RX ADMIN — ACETAMINOPHEN 1000 MG: 10 INJECTION, SOLUTION INTRAVENOUS at 09:09

## 2019-09-11 RX ADMIN — DOCUSATE SODIUM 200 MG: 100 CAPSULE, LIQUID FILLED ORAL at 09:09

## 2019-09-11 RX ADMIN — VENLAFAXINE 150 MG: 37.5 TABLET ORAL at 06:09

## 2019-09-11 RX ADMIN — FUROSEMIDE 7.5 MG/HR: 10 INJECTION, SOLUTION INTRAMUSCULAR; INTRAVENOUS at 12:09

## 2019-09-11 RX ADMIN — OXYCODONE HYDROCHLORIDE 10 MG: 10 TABLET ORAL at 01:09

## 2019-09-11 RX ADMIN — HEPARIN SODIUM 200 UNITS/HR: 10000 INJECTION, SOLUTION INTRAVENOUS at 02:09

## 2019-09-11 RX ADMIN — EPINEPHRINE 0.02 MCG/KG/MIN: 1 INJECTION INTRAMUSCULAR; INTRAVENOUS; SUBCUTANEOUS at 05:09

## 2019-09-11 RX ADMIN — Medication: at 02:09

## 2019-09-11 RX ADMIN — PANTOPRAZOLE SODIUM 40 MG: 40 INJECTION, POWDER, LYOPHILIZED, FOR SOLUTION INTRAVENOUS at 09:09

## 2019-09-11 RX ADMIN — RIFAMPIN 600 MG: 600 INJECTION, POWDER, LYOPHILIZED, FOR SOLUTION INTRAVENOUS at 09:09

## 2019-09-11 RX ADMIN — Medication: at 05:09

## 2019-09-11 RX ADMIN — DOBUTAMINE IN DEXTROSE 2.5 MCG/KG/MIN: 200 INJECTION, SOLUTION INTRAVENOUS at 04:09

## 2019-09-11 RX ADMIN — OXYCODONE HYDROCHLORIDE 10 MG: 10 TABLET ORAL at 06:09

## 2019-09-11 RX ADMIN — DIPHENHYDRAMINE HYDROCHLORIDE 25 MG: 25 CAPSULE ORAL at 04:09

## 2019-09-11 NOTE — PT/OT/SLP EVAL
"Speech Language Pathology Evaluation  Bedside Swallow    Patient Name:  Deborah Navas   MRN:  [id number]  Admitting Diagnosis: Acute on chronic combined systolic and diastolic heart failure    Recommendations:                 General Recommendations:  follow up for diet tolerance.   Diet recommendations:  Regular, Thin   Aspiration Precautions: Standard aspiration precautions   General Precautions: Standard, fall  Communication strategies:  none    History:     Past Medical History:   Diagnosis Date    Fractures     History of ventricular fibrillation 9/4/2019    History of ventricular tachycardia 9/4/2019    Hyperlipidemia     Migraine     Osteoarthritis        Past Surgical History:   Procedure Laterality Date    BACK SURGERY      2007    BONE GRAFT Left     from Left hip to Left FA    eardrum reconstruction  1980    ELBOW SURGERY Left [id number]    FOREARM FRACTURE SURGERY Bilateral [id number]    multiple surgeries    INSERTION OF IMPLANTABLE CARDIOVERTER-DEFIBRILLATOR (ICD) GENERATOR WITH TWO EXISTING LEADS      INSERTION OF PACEMAKER Left     INSERTION, CATHETER, RIGHT HEART Right 9/4/2019    [facility] by [facility] C[facility]    LUMBAR FUSION  2007    L4-L5    SINUS SURGERY Right 1994    with lymph nodes    TEMPOROMANDIBULAR JOINT SURGERY Right 1988    TONSILLECTOMY      TYMPANOSTOMY TUBE PLACEMENT  1971- 1979    multiple tube placements       Intubation History: Intubated 9/10 for LVAD, extubated same day.     Prior diet: regular consistencies/ thin liquids.       Subjective     "Will this test show if I can drink water"  "My voice does not sound like this I'm very hoarse"    Patient awake;alert. Mother present at the bedside.   RN present upon ST entry to room.     Pain/Comfort:  · Pain Rating 1: 0/10    Objective:     Oral Musculature Evaluation  · Oral Musculature: WFL  · Dentition: present and adequate  · Secretion Management: adequate  · Mucosal Quality: " "good  · Mandibular Strength and Mobility: WFL  · Oral Labial Strength and Mobility: WFL  · Lingual Strength and Mobility: WFL  · Velar Elevation: WFL  · Volitional Cough: Present  · Volitional Swallow: Elicited  · Voice Prior to PO Intake: Raspy, harsh, hoarse    Bedside Swallow Eval:   Consistencies Assessed:  · Thin liquids via cup and straw over 5 total ounces.   · Puree via 2 full tsp  · Solids via 1 whole cracker     Oral Phase:   · WFL    Pharyngeal Phase:   · no overt clinical signs/symptoms of aspiration  · no overt clinical signs/symptoms of pharyngeal dysphagia   · No throat clears, no coughing/choking, no desat in O2, vocal quality unchanged throughout trials.     Compensatory Strategies  · None    Treatment: Patient appearing appropriate for diet implementation of regular consistencies and thin liquids. Skilled education was provided to patient and family members re: diet recs, standard aspiration precautions of which to follow, and ongoing ST plan of care.      Assessment:     Deborah Navas is a 48 y.o. female with an SLP diagnosis of no oropharyngeal dysphagia  and Dysphonia.  She presents with reported "hoarse" vocal quality. ST to follow up to ensure diet tolerance prior to discharge. .    Goals:   Multidisciplinary Problems     SLP Goals        Problem: SLP Goal    Goal Priority Disciplines Outcome   SLP Goal     SLP    Description:  Speech Language Pathology Goals  Goals expected to be met by 9/18:  1. Patient will tolerate a regular consistency diet and thin liquids with no overt signs of airway compromise.                         Plan:     · Patient to be seen:  3 x/week   · Plan of Care expires:  10/11/19  · Plan of Care reviewed with:  patient   · SLP Follow-Up:  Yes       Discharge recommendations:  home   Barriers to Discharge:  None    Time Tracking:     SLP Treatment Date:   09/11/19  Speech Start Time:  1050  Speech Stop Time:  1058     Speech Total Time (min):  8 " min    Billable Minutes: Eval Swallow and Oral Function 8    Emily Abadie, CCC-SLP  09/11/2019

## 2019-09-11 NOTE — OP NOTE
DATE OF PROCEDURE:  09/10/2019    PREOPERATIVE DIAGNOSES:  1.  Acute-on-chronic systolic and diastolic heart failure, NYHA class IV,   INTERMACS III.  2.  Ventricular fibrillation.  3.  Cardiac defibrillator in situ.  4.  Ventricular tachyarrhythmia.  5.  Congestive cardiomyopathy.  6.  Acute-on-chronic combined systolic and diastolic heart failure.  7.  Enlarged thyroid.  8.  Chronic kidney disease.  9.  Knee pain.    POSTOPERATIVE DIAGNOSES:  1.  Acute-on-chronic systolic and diastolic heart failure, NYHA class IV,   INTERMACS III.  2.  Ventricular fibrillation.  3.  Cardiac defibrillator in situ.  4.  Ventricular tachyarrhythmia.  5.  Congestive cardiomyopathy.  6.  Acute-on-chronic combined systolic and diastolic heart failure.  7.  Enlarged thyroid.  8.  Chronic kidney disease.  9.  Knee pain.    PROCEDURES PERFORMED:  1.  Implantation of left ventricular assist device placement, HeartMate III.  2.  Creation of giancarlo-pericardium.  3.  Sternal closure.  4.  Placement of 20 x 5 cm wound VAC Prevena.    STAFF SURGEON:  Shar Walden M.D.    FIRST ASSISTANT:  April Seth.    ANESTHESIA:  GETA.    ESTIMATED BLOOD LOSS:  100 mL.    KEY FINDINGS OF THE OPERATION:  1.  Excellent RV dysfunction.  2.  Good hemostasis.    INDICATIONS OF OPERATION:  This is a 48-year-old patient who presented with   acute decompensation of heart failure.  The patient was worked up emergently.    The patient had a cardiac index of 1.2.  The patient was found to be a candidate   for destination therapy LVAD due to high PA pressures as well as social   situation.  Emergency selection was done.  The patient was approved for   implantation of HeartMate III.  The patient was informed about the different   treatment options as well as different types of pumps.  The patient wanted to   proceed with implantation of a HeartMate III.  Risks and benefits were discussed   and an informed consent obtained.    DESCRIPTION OF OPERATION:  The patient was  brought to the Operating Room and   placed in a supine position.  After induction of anesthesia, the area was   prepped and draped in the usual sterile fashion.  An upper midline incision was   made, which was carried all the way down to the sternum.  A median sternotomy   was then performed.  The sternal edges were then cauterized.  A chest retractor   was placed.  The pericardium was then opened up.  A pericardial well was   created.  The left hemidiaphragm was taken down.  The preperitoneal space was   dissected to create a good hemostatic LVAD pocket.  Once that was done, systemic   heparinization was done.  ACT greater than 450 was obtained.  Cannulation   stitches were placed.  Arterial cannula was placed in the ascending aorta.    Venous cannula was placed in the right atrial appendage.  Carbon dioxide was   used to flood continuously with CO2 to decrease the chances of air embolism.    The patient was placed on full cardiopulmonary bypass.  The heart was brought to   the field by placing multiple lap sponges behind the heart.  The LV apex was   identified in multiple views on NADINE examination.  True LV apex was cored out and   submitted for pathology.  Then, 2-0 Ethibond pledgeted stitches were placed   circumferentially.  The needles were then passed through the sewing ring.  The   needles were cut and passed off the field.  Sutures were then tied down.  The   LVAD was brought to the field.  It was lowered into the sewing ring with a   locking mechanism.  Once that was achieved, the heart was lowered back into the   chest cavity by removing all the lap sponges and the VAD was placed in the   preperitoneal space.  The outflow portion of the LVAD was then connected to   cardiopulmonary bypass suction for de-airing purposes.  Full ventilation was   resumed.  Outflow graft was brought to the field.  It was measured to   appropriate length.  It was beveled for 45 degrees angle.  A side-biting clamp   was placed  on the ascending aorta.  An aortotomy was made using a 5-0 Prolene   stitch.  A continuous running anastomosis of the outflow graft to the ascending   aorta was performed.  The side-biting clamp was then removed.  De-airing of the   outflow graft was then carried out.  Wet to wet connection of the outflow graft   to the LVAD was then carried out.  The driveline exit site was measured and was   marked at the level of the umbilicus in the right mid clavicular line.  Using a   tunneling device, the driveline was pulled out through this exit site and   connected to the console.  The clamp on the outflow graft was placed.  Root vent   was placed in the ascending aorta for de-airing purposes.  Inotropic support   was initiated.  Once the electrolytes were found to be within normal limits, the   patient was then weaned off from cardiopulmonary bypass on high root vent pass   and inotropic support.  Once that was achieved with volume loading, the speed of   the pump was increased from 3000 RPMs gradually all the way to 5400 RPMs and   the clamp on the outflow graft was removed after ensuring no intracardiac air.    Excellent hemodynamic was noted.  The septum appeared to be midline.  A test   dose of protamine was given followed by full dose of protamine to reverse the   effects of systemic heparin.  There was no aortic insufficiency or mitral   regurgitation.  [place] dose, all the various catheters and cannulas were   removed.  Excellent hemodynamics was noted.  Good hemostasis was maintained and   good RV function and the decision was made to close the chest.  Two mediastinal   drains were placed and brought through separate skin incision for drainage   purposes.  The sternum was approximated with #6 stainless steel wires.  The skin   and subcutaneous tissues were closed.  Due to the patient's large body habitus,   a wound VAC of 20 x 5 cm was placed to decrease the chances of any sternal   dehiscence.  Good seal was  obtained.  The patient was taken to the Intensive   Care Unit in stable condition.  Terminal count of needles, sponges and   instruments was found to be correct.      AB/IN  dd: 09/11/2019 11:49:59 (CDT)  td: 09/11/2019 12:42:21 (CDT)  Doc ID   #[id number]  Job ID #[id number]    CC:

## 2019-09-11 NOTE — PT/OT/SLP EVAL
Occupational Therapy   Evaluation    Name: Deborah Navas  MRN: [id number]  Admitting Diagnosis:  Acute on chronic combined systolic and diastolic heart failure 1 Day Post-Op    Recommendations:     Discharge Recommendations: home  Discharge Equipment Recommendations:  none  Barriers to discharge:  None    Assessment:     Deborah Navas is a 48 y.o. female with a medical diagnosis of Acute on chronic combined systolic and diastolic heart failure.  She was able to perform supine/sit T/F c max A and sit/stand T/F c CGA.  B UE are WFL.  Able to tolerate sitting up for approx. For approx. 5 minutes c SBA and tolerated well.  Educated pt on self-test and controller pouch.   Performance deficits affecting function: weakness, impaired endurance, impaired self care skills, impaired functional mobilty, decreased upper extremity function.      Rehab Prognosis: Good; patient would benefit from acute skilled OT services to address these deficits and reach maximum level of function.       Plan:     Patient to be seen 6 x/week to address the above listed problems via self-care/home management, therapeutic activities, therapeutic exercises  · Plan of Care Expires: 10/11/19  · Plan of Care Reviewed with: patient    Subjective     Chief Complaint: Pt is s/p LVAD and has sternal precautions  Patient/Family Comments/goals: To get better.    Occupational Profile:  Living Environment: Pt lives in a first floor apartment c no ZURI and has a tub/shower combo.  Previous level of function: I PTA  Equipment Used at Home:  none  Assistance upon Discharge: Pt lives alone but will be D/C home c sister.    Pain/Comfort:  · Pain Rating 1: 6/10(Chest)    Patients cultural, spiritual, Anabaptism conflicts given the current situation: no    Objective:     Communicated with: RN prior to session.  Patient found supine with arterial line, central line, chest tube, blood pressure cuff, parrish catheter, LVAD, oxygen, pressure relief boots,  peripheral IV, pulse ox (continuous)(Nitric) upon OT entry to room.    General Precautions: Standard, fall   Orthopedic Precautions:N/A   Braces: N/A     Occupational Performance:    Bed Mobility:    · Patient completed Supine to Sit with maximal assistance  · Patient completed Sit to Supine with maximal assistance    Functional Mobility/Transfers:  · Patient completed Sit <> Stand Transfer with contact guard assistance  with  hand-held assist   · Functional Mobility: Pt sat up on EOB for approx. 5 minutes c CGA.    Activities of Daily Living:  · Lower Body Dressing: total assistance to don socks.    Cognitive/Visual Perceptual:  Cognitive/Psychosocial Skills:     -       Oriented to: Person, Place, Time and Situation   -       Follows Commands/attention:Follows multistep  commands    Physical Exam:  Upper Extremity Range of Motion:     -       Right Upper Extremity: WFL  -       Left Upper Extremity: WFL  Upper Extremity Strength:    -       Right Upper Extremity: WFL  -       Left Upper Extremity: WFL    AMPAC 6 Click ADL:  AMPAC Total Score: 13    Treatment & Education:  Pt was able to perform self-test c mod A.  Education:    Patient left supine with all lines intact, call button in reach and RN notified    GOALS:   Multidisciplinary Problems     Occupational Therapy Goals        Problem: Occupational Therapy Goal    Goal Priority Disciplines Outcome Interventions   Occupational Therapy Goal     OT, PT/OT     Description:  Goals to be met by: 10/11/19     Patient will increase functional independence with ADLs by performing:    UE Dressing with [place].  LE Dressing with [place].  Grooming while standing at sink with [place].  Toileting from toilet with [place] for hygiene and clothing management.   Bathing from  shower chair/bench with [place].  Toilet transfer to toilet with [place].  Increased functional strength to WFL for B UE.  Upper extremity exercise program x15 reps per  handout, with independence.                      History:     Past Medical History:   Diagnosis Date    Fractures     History of ventricular fibrillation 9/4/2019    History of ventricular tachycardia 9/4/2019    Hyperlipidemia     Migraine     Osteoarthritis        Past Surgical History:   Procedure Laterality Date    BACK SURGERY      2007    BONE GRAFT Left     from Left hip to Left FA    eardrum reconstruction  1980    ELBOW SURGERY Left [id number]    FOREARM FRACTURE SURGERY Bilateral [id number]    multiple surgeries    INSERTION OF IMPLANTABLE CARDIOVERTER-DEFIBRILLATOR (ICD) GENERATOR WITH TWO EXISTING LEADS      INSERTION OF PACEMAKER Left     INSERTION, CATHETER, RIGHT HEART Right 9/4/2019    Performed by [facility] at [facility] CATH LAB    LUMBAR FUSION  2007    L4-L5    SINUS SURGERY Right 1994    with lymph nodes    TEMPOROMANDIBULAR JOINT SURGERY Right 1988    TONSILLECTOMY      TYMPANOSTOMY TUBE PLACEMENT  1971- 1979    multiple tube placements       Time Tracking:     OT Date of Treatment: 09/11/19  OT Start Time: 0942  OT Stop Time: 1008  OT Total Time (min): 26 min    Billable Minutes:Evaluation 13  Therapeutic Activity 13    JAGJIT Cordero  9/11/2019

## 2019-09-11 NOTE — PROGRESS NOTES
Dr. Walden updated on patient's progress. IV acetaminophen ordered for temp 101.3. Dr. Estevez notified for an MD to place order. Prn hydralazine ordered for map > 80 mmHg. Dilaudid PCA dosing adjusted. CVP 13. UO reviewed. Will continue to monitor.

## 2019-09-11 NOTE — ASSESSMENT & PLAN NOTE
BG goal 140 - 180     Change BG monitoring every 4 hours and low dose correction scale.   Please notify endocrine when diet is advanced to adjust insulin orders.

## 2019-09-11 NOTE — PROGRESS NOTES
Dr. Walden notified CVP 15 flat. Cardene 3 mg/hr. UO 48 ml for past hour, decreased from 75 ml. Creatinine 1.5. MD ordered 10 mg lasix push, start lasix gtt 7.5 mg/hr. Will continue to monitor.

## 2019-09-11 NOTE — NURSING
Dr. aWlden at bedside. Updated on pt course throughout day. Will d/c antibiotics scheduled after tonight. Will give 500 albumin per MD order.

## 2019-09-11 NOTE — PLAN OF CARE
Problem: Occupational Therapy Goal  Goal: Occupational Therapy Goal  Goals to be met by: 10/11/19     Patient will increase functional independence with ADLs by performing:    UE Dressing with [place].  LE Dressing with [place].  Grooming while standing at sink with [place].  Toileting from toilet with [place] for hygiene and clothing management.   Bathing from  shower chair/bench with [place].  Toilet transfer to toilet with [place].  Increased functional strength to WFL for B UE.  Upper extremity exercise program x15 reps per handout, with independence.    POC initiated.

## 2019-09-11 NOTE — PT/OT/SLP EVAL
Physical Therapy Evaluation    Patient Name:  Deborah Navas   MRN:  [id number]    Recommendations:     Discharge Recommendations:  home   Discharge Equipment Recommendations: none   Barriers to discharge: None    Assessment:     Deborah Navas is a 48 y.o. female admitted with a medical diagnosis of Acute on chronic combined systolic and diastolic heart failure.  She presents with the following impairments/functional limitations:  impaired endurance, impaired self care skills, impaired functional mobilty, decreased upper extremity function, impaired cardiopulmonary response to activity, gait instability.    Rehab Prognosis: Good; patient would benefit from acute skilled PT services to address these deficits and reach maximum level of function.    Recent Surgery: Procedure(s) (LRB):  INSERTION-LEFT VENTRICULAR ASSIST DEVICE (N/A)  INSERTION, GRAFT, PERICARDIUM  CLOSURE, WOUND, STERNUM 1 Day Post-Op    Plan:     During this hospitalization, patient to be seen 6 x/week to address the identified rehab impairments via gait training, therapeutic activities, therapeutic exercises, neuromuscular re-education and progress toward the following goals:    · Plan of Care Expires:  10/10/19    Subjective     Chief Complaint: pain   Patient/Family Comments/goals: to get better and return home   Pain/Comfort:  · Pain Rating 1: 6/10(sternum)  · Pain Addressed 1: Reposition, Distraction  · Pain Rating Post-Intervention 1: 6/10    Patients cultural, spiritual, Orthodoxy conflicts given the current situation: no    Living Environment:  Pt lives alone, with 2 cats, apartment, 2 ZURI.   Prior to admission, patients level of function was I with all functional mobility and ADLs.  Equipment used at home: none.  DME owned (not currently used): none.  Upon discharge, patient will have assistance from mother.       Objective:     Communicated with RN prior to session.  Patient found HOB elevated with telemetry, pulse ox  (continuous), blood pressure cuff, arterial line, central line, chest tube, parrish catheter, LVAD, oxygen, peripheral IV, PICC line, wound vac(nitric oxide, swan-saleem catheter)  upon PT entry to room.    General Precautions: Standard, LVAD, fall, sternal   Orthopedic Precautions:N/A   Braces: N/A     Exams:  · Cognitive Exam:   · AAOx4  · Follows multistep commands  · communication clear/fluent   · RLE ROM: WFL  · RLE Strength: WFL  · LLE ROM: WFL  · LLE Strength: WFL    Functional Mobility:  · Bed Mobility:     · Scooting: moderate assistance  · Supine to Sit: maximal assistance  · Sit to Supine: maximal assistance  · Transfers:     · Sit to Stand:  contact guard assistance with hand-held assist from EOB   · Gait: 3 L lateral steps with CGA      Therapeutic Activities and Exercises:  Educated pt on PT role/POC  Educated pt on importance of OOB activity and daily ambulation   Educated pt on sternal precautions  Pt verbalized understanding    Sitting EOB x 5 minutes with CGA  · Educated pt on naming controller, drive line, and power cables  · Educated pt on self test          AM-PAC 6 CLICK MOBILITY  Total Score:15     Patient left up in chair with all lines intact, call button in reach and RN notified.    GOALS:   Multidisciplinary Problems     Physical Therapy Goals        Problem: Physical Therapy Goal    Goal Priority Disciplines Outcome Goal Variances Interventions   Physical Therapy Goal     PT, PT/OT Ongoing (interventions implemented as appropriate)     Description:  Goals to be met by: 10/1/2019    Patient will increase functional independence with mobility by performin. Supine to sit with Contact Guard Assistance - not met  2. Sit to stand transfer with Supervision - not met  3. Gait  x 200 feet with Supervision  - not met  4. Ascend/descend 2 stair with Contact Guard Assistance - not met                      History:     Past Medical History:   Diagnosis Date    Fractures     History of ventricular  fibrillation 9/4/2019    History of ventricular tachycardia 9/4/2019    Hyperlipidemia     Migraine     Osteoarthritis        Past Surgical History:   Procedure Laterality Date    BACK SURGERY      2007    BONE GRAFT Left     from Left hip to Left FA    CLOSURE, WOUND, STERNUM  9/10/2019    [facility]formed [facility] OR 2ND FLR    eardrum reconstruction  1980    ELBOW SURGERY Left [id number]    FOREARM FRACTURE SURGERY Bilateral [id number]    multiple surgeries    INSERTION OF IMPLANTABLE CARDIOVERTER-DEFIBRILLATOR (ICD) GENERATOR WITH TWO EXISTING LEADS      INSERTION OF PACEMAKER Left     INSERTION, CATHETER, RIGHT HEART Right 9/4/2019    Performed by Vi Bryant MD at [facility] CATH LAB    INSERTION, GRAFT, PERICARDIUM  9/10/2019    Performed by Shar Walden MD at [facility] OR 2ND FLR    INSERTION-LEFT VENTRICULAR ASSIST DEVICE N/A 9/10/2019    Performed by Shar [facility] MD at [facility] OR 2ND FLR    LUMBAR FUSION  2007    L4-L5    SINUS SURGERY Right 1994    with lymph nodes    TEMPOROMANDIBULAR JOINT SURGERY Right 1988    TONSILLECTOMY      TYMPANOSTOMY TUBE PLACEMENT  1971- 1979    multiple tube placements       Time Tracking:     PT Received On: 09/11/19  PT Start Time: 0949     PT Stop Time: 1012  PT Total Time (min): 23 min     Billable Minutes: Re-eval 10 and Therapeutic Activity 8      Niurka Posadas, PT, DPT  9/11/2019  [phone]

## 2019-09-11 NOTE — CONSULTS
NIAS consulted for PICC placement    Per protocol: patient needs to be 24hr afebrile and have 48hr negative blood cultures    Patient had fever of 101.4 today @0700 and blood cultures drawn subsequently.     (GFR<45 needs to be cleared by nephrology on call for PICC approval)    Will keep consult in until protocols are met    [id number] for any questions

## 2019-09-11 NOTE — PLAN OF CARE
Problem: Physical Therapy Goal  Goal: Physical Therapy Goal  Goals to be met by: 10/1/2019    Patient will increase functional independence with mobility by performin. Supine to sit with Contact Guard Assistance - not met  2. Sit to stand transfer with Supervision - not met  3. Gait  x 200 feet with Supervision  - not met  4. Ascend/descend 2 stair with Contact Guard Assistance - not met    Outcome: Ongoing (interventions implemented as appropriate)  eval completed and goals appropriate

## 2019-09-11 NOTE — SUBJECTIVE & OBJECTIVE
Interval History: patient did well overnight. Extubated the afternoon following VAD. Fevers overnight. CVP 13 this AM.    Continuous Infusions:   sodium chloride 0.9%      dextrose 5 % and 0.45 % NaCl with KCl 40 mEq 10 mL/hr at 09/10/19 1334    DOBUTamine 4 mcg/kg/min (09/11/19 1500)    epinephrine 0.03 mcg/kg/min (09/11/19 1500)    furosemide (LASIX) 2 mg/mL infusion (non-titrating) 7.5 mg/hr (09/11/19 1500)    heparin (porcine) in 5 % dex 200 Units/hr (09/11/19 1500)    hydromorphone in 0.9 % NaCl 6 mg/30 ml      nicardipine Stopped (09/11/19 0900)    nitric oxide gas       Scheduled Meds:   acetaminophen  1,000 mg Intravenous Q8H    aspirin  325 mg Oral Daily    aspirin  325 mg Per NG tube Daily    ceFEPime (MAXIPIME) IVPB  2 g Intravenous Q24H    docusate sodium  200 mg Oral QHS    mupirocin   Nasal BID    pantoprazole  40 mg Intravenous Daily    rifAMpin (RIFADIN) IVPB  600 mg Intravenous Q24H    vancomycin (VANCOCIN) IVPB  15 mg/kg Intravenous Q24H    [START ON 9/12/2019] venlafaxine  150 mg Oral Daily    warfarin  2 mg Oral Once     PRN Meds:albumin human 5%, albuterol sulfate, bisacodyl, Dextrose 10% Bolus, Dextrose 10% Bolus, Dextrose 10% Bolus, Dextrose 10% Bolus, Dextrose 10% Bolus, diphenhydrAMINE, fentaNYL, glucagon (human recombinant), hydrALAZINE, HYDROmorphone, insulin aspart U-100, magnesium hydroxide 400 mg/5 ml, magnesium sulfate IVPB, naloxone, ondansetron, oxyCODONE, oxyCODONE, potassium chloride, potassium chloride, sodium chloride 0.9%    Review of patient's allergies indicates:   Allergen Reactions    Adhesive Blisters     Reaction to area in chest and up only    Codeine Itching     Objective:     Vital Signs (Most Recent):  Temp: 99.2 °F (37.3 °C) (09/11/19 1100)  Pulse: 97 (09/11/19 1445)  Resp: 15 (09/11/19 1445)  BP: (!) 82/2 (09/11/19 0745)  SpO2: (!) 94 % (09/11/19 6275) Vital Signs (24h Range):  Temp:  [99.1 °F (37.3 °C)-101.4 °F (38.6 °C)] 99.2 °F (37.3  °C)  Pulse:  [] 97  Resp:  [10-32] 15  SpO2:  [89 %-100 %] 94 %  BP: (68-82)/(0-2) 82/2  Arterial Line BP: ()/() 97/97     Patient Vitals for the past 72 hrs (Last 3 readings):   Weight   09/11/19 0518 114.2 kg (251 lb 12.3 oz)   09/10/19 1300 104.4 kg (230 lb 2.6 oz)   09/10/19 0417 104.4 kg (230 lb 2.6 oz)     Body mass index is 39.43 kg/m².      Intake/Output Summary (Last 24 hours) at 9/11/2019 1601  Last data filed at 9/11/2019 1500  Gross per 24 hour   Intake 1130.7 ml   Output 2595 ml   Net -1464.3 ml       Hemodynamic Parameters:  PAP: (21-33)/(13-24) 26/21  PAP (Mean):  [18 mmHg-27 mmHg] 24 mmHg  PCWP:  [13 mmHg-23 mmHg] 22 mmHg  CO:  [3.9 L/min-5.9 L/min] 4.8 L/min  CI:  [1.8 L/min/m2-2.8 L/min/m2] 2.3 L/min/m2    Physical Exam   Constitutional: She appears well-developed and well-nourished. No distress.   Eyes: Conjunctivae are normal. Right eye exhibits no discharge. Left eye exhibits no discharge.   Neck: No JVD present.   Cardiovascular: Normal rate, regular rhythm and normal heart sounds.   Smooth LVAD hum   Pulmonary/Chest: Effort normal and breath sounds normal. No stridor. No respiratory distress. She has no wheezes.   Intubated   Abdominal: Soft. Bowel sounds are normal.   Musculoskeletal: Normal range of motion. She exhibits no edema or deformity.   Neurological: She is alert.   Skin: Skin is warm and dry. Capillary refill takes less than 2 seconds. She is not diaphoretic. No erythema.       Significant Labs:  CBC:  Recent Labs   Lab 09/11/19  0008  09/11/19  0400 09/11/19  0735 09/11/19  0822   WBC 10.73  --  12.46  12.46  --  14.33*   RBC 3.21*  --  3.37*  3.37*  --  3.43*   HGB 9.4*  --  9.8*  9.8*  --  10.0*   HCT 31.1*   < > 32.5*  32.5* 33* 32.9*   *  --  144*  144*  --  136*   MCV 97  --  96  96  --  96   MCH 29.3  --  29.1  29.1  --  29.2   MCHC 30.2*  --  30.2*  30.2*  --  30.4*    < > = values in this interval not displayed.     BNP:  Recent Labs   Lab  09/06/19  1834 09/11/19  0400   * 976*     CMP:  Recent Labs   Lab 09/10/19  0536  09/11/19  0008 09/11/19  0400 09/11/19  0822   *   < > 157* 156* 156*  156*   CALCIUM 9.7   < > 9.1 9.2 9.0  9.0   ALBUMIN 3.7  --   --  4.1 3.8   PROT 7.0  --   --  6.8 6.5      < > 135* 135* 140  140   K 3.8   < > 4.6 4.4 4.5  4.5   CO2 22*   < > 21* 25 20*  20*      < > 103 100 104  104   BUN 25*   < > 20 19 22*  22*   CREATININE 1.8*   < > 1.4 1.5* 1.5*  1.5*   ALKPHOS 76  --   --  62 57   ALT 23  --   --  29 29   AST 33  --   --  114* 102*   BILITOT 0.5  --   --  1.8* 1.6*    < > = values in this interval not displayed.      Coagulation:   Recent Labs   Lab 09/11/19  0008 09/11/19  0400 09/11/19  0822 09/11/19  1420   INR 1.0 1.0 1.1  --    APTT 22.2 23.1 24.6 26.0     LDH:  Recent Labs   Lab 09/11/19 0822   *     Microbiology:  Microbiology Results (last 7 days)     Procedure Component Value Units Date/Time    Blood culture [[id number]] Collected:  09/11/19 0832    Order Status:  Completed Specimen:  Blood from Line, Jugular, Internal Right Updated:  09/11/19 1545     Blood Culture, Routine No Growth to date    Blood culture [[id number]] Collected:  09/11/19 0810    Order Status:  Completed Specimen:  Blood from Peripheral, Antecubital, Left Updated:  09/11/19 1545     Blood Culture, Routine No Growth to date    HPV High Risk Genotypes, PCR [[id number]]     Order Status:  Completed     Blood culture [[id number]] Collected:  09/11/19 0820    Order Status:  Sent Specimen:  Blood from Line, Jugular, Internal Left Updated:  09/11/19 0822          I have reviewed all pertinent labs within the past 24 hours.    Estimated Creatinine Clearance: 59.2 mL/min (A) (based on SCr of 1.5 mg/dL (H)).    Diagnostic Results:  I have reviewed and interpreted all pertinent imaging results/findings within the past 24 hours.

## 2019-09-11 NOTE — PROGRESS NOTES
"[facility]  Endocrinology  Progress Note    Admit Date: 9/4/2019     Reason for Consult: Management of Hyperglycemia     Surgical Procedure and Date: LVAD 9/10/19    HPI:   Patient is a 48 y.o. female with a diagnosis of NICM, CKD, and HLD. Patient admitted with for advanced heart failure options. No previous history of DM per chart review. A1C elevated on pre-op A1C. Endocrinology consulted post-LVAD for BG management.             Interval HPI:   Overnight events: Remains in ICU. Extubated. BG at goal without insulin.   Eating:   NPO  Nausea: No  Hypoglycemia and intervention: No  Fever: 101.4  TPN and/or TF: No      BP (!) 82/2 (BP Location: Left arm, Patient Position: Lying)   Pulse 103   Temp (!) 101.4 °F (38.6 °C) (Core ([place]))   Resp (!) 28   Ht 5' 7" (1.702 m)   Wt 114.2 kg (251 lb 12.3 oz)   LMP  (Within Years)   SpO2 96%   Breastfeeding? No   BMI 39.43 kg/m²      Labs Reviewed and Include    Recent Labs   Lab 09/11/19  0400   *   CALCIUM 9.2   ALBUMIN 4.1   PROT 6.8   *   K 4.4   CO2 25      BUN 19   CREATININE 1.5*   ALKPHOS 62   ALT 29   *   BILITOT 1.8*     Lab Results   Component Value Date    WBC 12.46 09/11/2019    WBC 12.46 09/11/2019    HGB 9.8 (L) 09/11/2019    HGB 9.8 (L) 09/11/2019    HCT 33 (L) 09/11/2019    MCV 96 09/11/2019    MCV 96 09/11/2019     (L) 09/11/2019     (L) 09/11/2019     Recent Labs   Lab 09/06/19  0846   TSH 2.290   FREET4 1.49     Lab Results   Component Value Date    HGBA1C 6.5 (H) 09/06/2019       Nutritional status:   Body mass index is 39.43 kg/m².  Lab Results   Component Value Date    ALBUMIN 4.1 09/11/2019    ALBUMIN 3.7 09/10/2019    ALBUMIN 3.5 09/09/2019     Lab Results   Component Value Date    PREALBUMIN 22 09/11/2019    PREALBUMIN 32 09/06/2019       Estimated Creatinine Clearance: 59.2 mL/min (A) (based on SCr of 1.5 mg/dL (H)).    Accu-Checks  Recent Labs     09/10/19  2202 09/10/19  6639 " 09/11/19  0006 09/11/19  0106 09/11/19  0205 09/11/19  0301 09/11/19  0401 09/11/19  0504 09/11/19  0608 09/11/19  0842   POCTGLUCOSE 123* 128* 157* 147* 142* 154* 163* 130* 131* 144*       Current Medications and/or Treatments Impacting Glycemic Control  Immunotherapy:    Immunosuppressants     None        Steroids:   Hormones (From admission, onward)    None        Pressors:    Autonomic Drugs (From admission, onward)    Start     Stop Route Frequency Ordered    09/10/19 1515  EPINEPHrine (ADRENALIN) 5 mg in sodium chloride 0.9% 250 mL infusion     Question Answer Comment   Titrate by: (in mcg/kg/min) 0.02    Titrate interval: (in minutes) 5    Titrate to maintain: (SBP or MAP or Cardiac Index) MAP    Greater than: (in mmHg) 70    Maximum dose: (in mcg/kg/min) 0.1        -- IV Continuous 09/10/19 1419        Hyperglycemia/Diabetes Medications:   Antihyperglycemics (From admission, onward)    Start     Stop Route Frequency Ordered    09/10/19 1545  insulin regular (Humulin R) 100 Units in sodium chloride 0.9% 100 mL infusion     Question:  Insulin Rate Adjustment (DO NOT MODIFY ANSWER)  Answer:  [email or web address]    -- IV Continuous 09/10/19 1433          ASSESSMENT and PLAN    * Acute on chronic combined systolic and diastolic heart failure  Managed per primary. S/p LVAD        Acute hyperglycemia  BG goal 140 - 180     Change BG monitoring every 4 hours and low dose correction scale.   Please notify endocrine when diet is advanced to adjust insulin orders.            LVAD (left ventricular assist device) present  Managed per primary.   avoid hypoglycemia        CKD (chronic kidney disease)  Titrate insulin slowly to avoid hypoglycemia as the risk of hypoglycemia increases with decreased creatinine clearance.    Estimated Creatinine Clearance: 59.2 mL/min (A) (based on SCr of 1.5 mg/dL (H)).            Consuelo Adler NP  Endocrinology  [facility]

## 2019-09-11 NOTE — SUBJECTIVE & OBJECTIVE
"Interval HPI:   Overnight events: Remains in ICU. Extubated. BG at goal without insulin.   Eating:   NPO  Nausea: No  Hypoglycemia and intervention: No  Fever: 101.4  TPN and/or TF: No      BP (!) 82/2 (BP Location: Left arm, Patient Position: Lying)   Pulse 103   Temp (!) 101.4 °F (38.6 °C) (Core ([place]))   Resp (!) 28   Ht 5' 7" (1.702 m)   Wt 114.2 kg (251 lb 12.3 oz)   LMP  (Within Years)   SpO2 96%   Breastfeeding? No   BMI 39.43 kg/m²     Labs Reviewed and Include    Recent Labs   Lab 09/11/19  0400   *   CALCIUM 9.2   ALBUMIN 4.1   PROT 6.8   *   K 4.4   CO2 25      BUN 19   CREATININE 1.5*   ALKPHOS 62   ALT 29   *   BILITOT 1.8*     Lab Results   Component Value Date    WBC 12.46 09/11/2019    WBC 12.46 09/11/2019    HGB 9.8 (L) 09/11/2019    HGB 9.8 (L) 09/11/2019    HCT 33 (L) 09/11/2019    MCV 96 09/11/2019    MCV 96 09/11/2019     (L) 09/11/2019     (L) 09/11/2019     Recent Labs   Lab 09/06/19  0846   TSH 2.290   FREET4 1.49     Lab Results   Component Value Date    HGBA1C 6.5 (H) 09/06/2019       Nutritional status:   Body mass index is 39.43 kg/m².  Lab Results   Component Value Date    ALBUMIN 4.1 09/11/2019    ALBUMIN 3.7 09/10/2019    ALBUMIN 3.5 09/09/2019     Lab Results   Component Value Date    PREALBUMIN 22 09/11/2019    PREALBUMIN 32 09/06/2019       Estimated Creatinine Clearance: 59.2 mL/min (A) (based on SCr of 1.5 mg/dL (H)).    Accu-Checks  Recent Labs     09/10/19  2202 09/10/19  2257 09/11/19  0006 09/11/19  0106 09/11/19  0205 09/11/19  0301 09/11/19  0401 09/11/19  0504 09/11/19  0608 09/11/19  0842   POCTGLUCOSE 123* 128* 157* 147* 142* 154* 163* 130* 131* 144*       Current Medications and/or Treatments Impacting Glycemic Control  Immunotherapy:    Immunosuppressants     None        Steroids:   Hormones (From admission, onward)    None        Pressors:    Autonomic Drugs (From admission, onward)    Start     Stop Route Frequency " Ordered    09/10/19 1515  EPINEPHrine (ADRENALIN) 5 mg in sodium chloride 0.9% 250 mL infusion     Question Answer Comment   Titrate by: (in mcg/kg/min) 0.02    Titrate interval: (in minutes) 5    Titrate to maintain: (SBP or MAP or Cardiac Index) MAP    Greater than: (in mmHg) 70    Maximum dose: (in mcg/kg/min) 0.1        -- IV Continuous 09/10/19 1419        Hyperglycemia/Diabetes Medications:   Antihyperglycemics (From admission, onward)    Start     Stop Route Frequency Ordered    09/10/19 1545  insulin regular (Humulin R) 100 Units in sodium chloride 0.9% 100 mL infusion     Question:  Insulin Rate Adjustment (DO NOT MODIFY ANSWER)  Answer:  [email or web address]    -- IV Continuous 09/10/19 1439

## 2019-09-11 NOTE — PROGRESS NOTES
[facility]  Heart Transplant  Progress Note    Patient Name: Deborah Navas  MRN: [id number]  Admission Date: 9/4/2019  Hospital Length of Stay: 7 days  Attending Physician: Shar Walden MD  Primary Care Provider: Primary Doctor No  Principal Problem:Acute on chronic combined systolic and diastolic heart failure    Subjective:     Interval History: patient did well overnight. Extubated the afternoon following VAD. Fevers overnight. CVP 13 this AM.    Continuous Infusions:   sodium chloride 0.9%      dextrose 5 % and 0.45 % NaCl with KCl 40 mEq 10 mL/hr at 09/10/19 1334    DOBUTamine 4 mcg/kg/min (09/11/19 1500)    epinephrine 0.03 mcg/kg/min (09/11/19 1500)    furosemide (LASIX) 2 mg/mL infusion (non-titrating) 7.5 mg/hr (09/11/19 1500)    heparin (porcine) in 5 % dex 200 Units/hr (09/11/19 1500)    hydromorphone in 0.9 % NaCl 6 mg/30 ml      nicardipine Stopped (09/11/19 0900)    nitric oxide gas       Scheduled Meds:   acetaminophen  1,000 mg Intravenous Q8H    aspirin  325 mg Oral Daily    aspirin  325 mg Per NG tube Daily    ceFEPime (MAXIPIME) IVPB  2 g Intravenous Q24H    docusate sodium  200 mg Oral QHS    mupirocin   Nasal BID    pantoprazole  40 mg Intravenous Daily    rifAMpin (RIFADIN) IVPB  600 mg Intravenous Q24H    vancomycin (VANCOCIN) IVPB  15 mg/kg Intravenous Q24H    [START ON 9/12/2019] venlafaxine  150 mg Oral Daily    warfarin  2 mg Oral Once     PRN Meds:albumin human 5%, albuterol sulfate, bisacodyl, Dextrose 10% Bolus, Dextrose 10% Bolus, Dextrose 10% Bolus, Dextrose 10% Bolus, Dextrose 10% Bolus, diphenhydrAMINE, fentaNYL, glucagon (human recombinant), hydrALAZINE, HYDROmorphone, insulin aspart U-100, magnesium hydroxide 400 mg/5 ml, magnesium sulfate IVPB, naloxone, ondansetron, oxyCODONE, oxyCODONE, potassium chloride, potassium chloride, sodium chloride 0.9%    Review of patient's allergies indicates:   Allergen Reactions    Adhesive  Blisters     Reaction to area in chest and up only    Codeine Itching     Objective:     Vital Signs (Most Recent):  Temp: 99.2 °F (37.3 °C) (09/11/19 1100)  Pulse: 97 (09/11/19 1445)  Resp: 15 (09/11/19 1445)  BP: (!) 82/2 (09/11/19 0745)  SpO2: (!) 94 % (09/11/19 1445) Vital Signs (24h Range):  Temp:  [99.1 °F (37.3 °C)-101.4 °F (38.6 °C)] 99.2 °F (37.3 °C)  Pulse:  [] 97  Resp:  [10-32] 15  SpO2:  [89 %-100 %] 94 %  BP: (68-82)/(0-2) 82/2  Arterial Line BP: ()/() 97/97     Patient Vitals for the past 72 hrs (Last 3 readings):   Weight   09/11/19 0518 114.2 kg (251 lb 12.3 oz)   09/10/19 1300 104.4 kg (230 lb 2.6 oz)   09/10/19 0417 104.4 kg (230 lb 2.6 oz)     Body mass index is 39.43 kg/m².      Intake/Output Summary (Last 24 hours) at 9/11/2019 1601  Last data filed at 9/11/2019 1500  Gross per 24 hour   Intake 1130.7 ml   Output 2595 ml   Net -1464.3 ml       Hemodynamic Parameters:  PAP: (21-33)/(13-24) 26/21  PAP (Mean):  [18 mmHg-27 mmHg] 24 mmHg  PCWP:  [13 mmHg-23 mmHg] 22 mmHg  CO:  [3.9 L/min-5.9 L/min] 4.8 L/min  CI:  [1.8 L/min/m2-2.8 L/min/m2] 2.3 L/min/m2    Physical Exam   Constitutional: She appears well-developed and well-nourished. No distress.   Eyes: Conjunctivae are normal. Right eye exhibits no discharge. Left eye exhibits no discharge.   Neck: No JVD present.   Cardiovascular: Normal rate, regular rhythm and normal heart sounds.   Smooth LVAD hum   Pulmonary/Chest: Effort normal and breath sounds normal. No stridor. No respiratory distress. She has no wheezes.   Intubated   Abdominal: Soft. Bowel sounds are normal.   Musculoskeletal: Normal range of motion. She exhibits no edema or deformity.   Neurological: She is alert.   Skin: Skin is warm and dry. Capillary refill takes less than 2 seconds. She is not diaphoretic. No erythema.       Significant Labs:  CBC:  Recent Labs   Lab 09/11/19  0008  09/11/19  0400 09/11/19  0735 09/11/19  0822   WBC 10.73  --  12.46  12.46   --  14.33*   RBC 3.21*  --  3.37*  3.37*  --  3.43*   HGB 9.4*  --  9.8*  9.8*  --  10.0*   HCT 31.1*   < > 32.5*  32.5* 33* 32.9*   *  --  144*  144*  --  136*   MCV 97  --  96  96  --  96   MCH 29.3  --  29.1  29.1  --  29.2   MCHC 30.2*  --  30.2*  30.2*  --  30.4*    < > = values in this interval not displayed.     BNP:  Recent Labs   Lab 09/06/19  1834 09/11/19 0400   * 976*     CMP:  Recent Labs   Lab 09/10/19  0536  09/11/19  0008 09/11/19 0400 09/11/19 0822   *   < > 157* 156* 156*  156*   CALCIUM 9.7   < > 9.1 9.2 9.0  9.0   ALBUMIN 3.7  --   --  4.1 3.8   PROT 7.0  --   --  6.8 6.5      < > 135* 135* 140  140   K 3.8   < > 4.6 4.4 4.5  4.5   CO2 22*   < > 21* 25 20*  20*      < > 103 100 104  104   BUN 25*   < > 20 19 22*  22*   CREATININE 1.8*   < > 1.4 1.5* 1.5*  1.5*   ALKPHOS 76  --   --  62 57   ALT 23  --   --  29 29   AST 33  --   --  114* 102*   BILITOT 0.5  --   --  1.8* 1.6*    < > = values in this interval not displayed.      Coagulation:   Recent Labs   Lab 09/11/19  0008 09/11/19  0400 09/11/19  0822 09/11/19  1420   INR 1.0 1.0 1.1  --    APTT 22.2 23.1 24.6 26.0     LDH:  Recent Labs   Lab 09/11/19 0822   *     Microbiology:  Microbiology Results (last 7 days)     Procedure Component Value Units Date/Time    Blood culture [[id number]] Collected:  09/11/19 0832    Order Status:  Completed Specimen:  Blood from Line, Jugular, Internal Right Updated:  09/11/19 1545     Blood Culture, Routine No Growth to date    Blood culture [[id number]] Collected:  09/11/19 0810    Order Status:  Completed Specimen:  Blood from Peripheral, Antecubital, Left Updated:  09/11/19 1545     Blood Culture, Routine No Growth to date    HPV High Risk Genotypes, PCR [[id number]]     Order Status:  Completed     Blood culture [[id number]] Collected:  09/11/19 0820    Order Status:  Sent Specimen:  Blood from Line, Jugular, Internal Left Updated:  09/11/19 0822           I have reviewed all pertinent labs within the past 24 hours.    Estimated Creatinine Clearance: 59.2 mL/min (A) (based on SCr of 1.5 mg/dL (H)).    Diagnostic Results:  I have reviewed and interpreted all pertinent imaging results/findings within the past 24 hours.    Assessment and Plan:     * Acute on chronic combined systolic and diastolic heart failure  NICM EF: 20%, LVEDD: 6.8cm. Repeat echo ordered and pending  RHC: done on admit 9/4/19 RA: 20/ 20/ 18 RV: 60/ 8/ 18 PA: 60/ 32/ 45 PWP: 43/ 70/ 40 . Cardiac output was 2.27 by Fitz. Cardiac index is 1.04 L/min/m2. O2 Sat: PA 34%. AO 95% PVR 2.2 PALMER  - S/p LVAD 9/10  - Management per CTS  - CVP 13 this morning  - Epi 0.05 and dopamine 2.5    CKD (chronic kidney disease)  Baseline cr around 1.3-1.4 in early 2019  - Cr at baseline today   - Renally dose meds from CrCl 45-30  - Avoid nephrotoxic agents    Abnormal CT scan  -focal opacity found at right base.  DDx: atelectasis.  Recommending repeat CT in 3 months    Enlarged thyroid  Ultrasound done and showed enlarged multinodular thyroid which warrants surveillance in one year.   - TSH and fT4 wnl    Ventricular tachyarrhythmia  - Amiodarone 200 mg PO BID  - Goal K >4 and Mg >2  - Monitor on telemetry        Jatinder Alves MD  Heart Transplant  [facility]

## 2019-09-11 NOTE — PLAN OF CARE
Problem: SLP Goal  Goal: SLP Goal  Speech Language Pathology Goals  Goals expected to be met by 9/18:  1. Patient will tolerate a regular consistency diet and thin liquids with no overt signs of airway compromise.       Clinical swallowing evaluation completed with implemented plan of care. ST recommending a regular consistency diet and thin liquids.  Emily P. Abadie M.S., CCC-SLP  Speech Language Pathologist  [phone]  09/11/2019

## 2019-09-11 NOTE — HPI
History of Present Illness:  47 yo female with hx NICM: EF 20%, LVEDD: 6.8cm, V-fib reported in setting of hypokalemia with appropriate shock from ICD (on Amiodarone), HLP, admitted from procedure room for management of cardiogenic shock and workup for advanced options.  Patient initally saw Dr. Zarate in clinic 8/27/19 as initial consult for advanced options.  At time, she reported feeling fine with only complaint of fatigue which was at baseline. RHC 9/4/19 which showed: RA: 20/ 20/ 18 RV: 60/ 8/ 18 PA: 60/ 32/ 45 PWP: 43/ 70/ 40 . Cardiac output was 2.27 by Fitz. Cardiac index is 1.04 L/min/m2. O2 Sat: PA 34%. AO 95% PVR 2.2 PALMER.  She is being admitted for diuresis, inotropic support (if she tolerates it from the arrhythmia standpoint), and work up for possible LVAD/OHT. Patient determined to be a surgical candidate, due to BMI 35, decision to proceed with VAD implantation.

## 2019-09-11 NOTE — HOSPITAL COURSE
Patient was taken to the operating room 9/10/19 for insertion of LVAD, insertion of graft pericardium, closure of sternum. She tolerated the procedure well and arrived to the SICU intubated and sedated, on vasopressors in stable condition with LVAD rate of 5300rpm, flow at 4.31.

## 2019-09-11 NOTE — SUBJECTIVE & OBJECTIVE
Interval History/Significant Events: NAEON, VSS. Urine output fluctuating between 30 and 100cc/hr. Lasix drip at 7.5.  Tmax 101.4.  Antibiotics stopped.  HR 91, LVAD in place and functioning, Pump flow 4.3, Speed 5300, and Pulse Index 3. CVP 14. She is breathing comfortably on 4 L NC, reports pain is adequately controlled. 2 CT with right 220cc and left 48cc.      Follow-up For: Procedure(s) (LRB):  INSERTION-LEFT VENTRICULAR ASSIST DEVICE (N/A)  INSERTION, GRAFT, PERICARDIUM  CLOSURE, WOUND, STERNUM    Post-Operative Day: 1 Day Post-Op    Objective:     Vital Signs (Most Recent):  Temp: (!) 101.4 °F (38.6 °C) (09/11/19 0700)  Pulse: (!) 145 (09/11/19 1137)  Resp: (!) 35 (09/11/19 1137)  BP: (!) 82/2 (09/11/19 0745)  SpO2: (!) 94 % (09/11/19 1137) Vital Signs (24h Range):  Temp:  [97 °F (36.1 °C)-101.4 °F (38.6 °C)] 101.4 °F (38.6 °C)  Pulse:  [] 145  Resp:  [10-48] 35  SpO2:  [80 %-100 %] 94 %  BP: ()/(0-78) 82/2  Arterial Line BP: ()/() 80/70     Weight: 114.2 kg (251 lb 12.3 oz)  Body mass index is 39.43 kg/m².      Intake/Output Summary (Last 24 hours) at 9/11/2019 1210  Last data filed at 9/11/2019 1000  Gross per 24 hour   Intake 2206.7 ml   Output 3905 ml   Net -1698.3 ml       Physical Exam   Constitutional: She is oriented to person, place, and time. She appears well-developed and well-nourished.   HENT:   Head: Normocephalic.   Eyes: Pupils are equal, round, and reactive to light. EOM are normal.   Neck: Normal range of motion.   Cardiovascular: Normal rate and regular rhythm.   Midline chest incision   Pulmonary/Chest: Effort normal.   Abdominal: Soft. She exhibits no distension. There is no tenderness.   Musculoskeletal: Normal range of motion.   Neurological: She is alert and oriented to person, place, and time.   Skin: Skin is warm and dry.       Vents:  Vent Mode: A/C (09/10/19 1252)  Set Rate: 14 bmp (09/10/19 1252)  Vt Set: 350 mL (09/10/19 1252)  PEEP/CPAP: 5 cmH20 (09/10/19  1252)  Oxygen Concentration (%): 36 (09/11/19 1137)  Peak Airway Pressure: 29 cmH2O (09/10/19 1252)  Plateau Pressure: 0 cmH20 (09/10/19 1252)  Total Ve: 8.93 mL (09/10/19 1252)  Negative Inspiratory Force (cm H2O): -50 (09/10/19 0400)  F/VT Ratio<105 (RSBI): (!) 77.35 (09/10/19 1252)    Lines/Drains/Airways     Central Venous Catheter Line                 Percutaneous Central Line Insertion/Assessment - Quad lumen  09/10/19 0735 1 day         Percutaneous Central Line Insertion/Assessment - quad lumen  09/10/19 0735 1 day         Pulmonary Artery Catheter Assessment  09/10/19 0735 1 day          Drain                 Chest Tube 09/10/19 1200 1 Right 1 day         Chest Tube 09/10/19 1200 2 1 day         Urethral Catheter 09/10/19 0732 Temperature probe 16 Fr. 1 day          Arterial Line                 Arterial Line 09/10/19 0710 Right Other (Comment) 1 day          Line                 VAD 09/10/19 1322 Left ventricular assist device HeartMate 3 less than 1 day          Peripheral Intravenous Line                 Peripheral IV - Single Lumen 09/09/19 1030 18 G Left Antecubital 2 days         Peripheral IV - Single Lumen 09/09/19 1600 20 G Left Forearm 1 day                Significant Labs:    CBC/Anemia Profile:  Recent Labs   Lab 09/11/19  0008  09/11/19  0400 09/11/19  0735 09/11/19  0822   WBC 10.73  --  12.46  12.46  --  14.33*   HGB 9.4*  --  9.8*  9.8*  --  10.0*   HCT 31.1*   < > 32.5*  32.5* 33* 32.9*   *  --  144*  144*  --  136*   MCV 97  --  96  96  --  96   RDW 14.9*  --  15.0*  15.0*  --  15.1*    < > = values in this interval not displayed.        Chemistries:  Recent Labs   Lab 09/10/19  0536  09/11/19  0008 09/11/19  0400 09/11/19  0822      < > 135* 135* 140  140   K 3.8   < > 4.6 4.4 4.5  4.5      < > 103 100 104  104   CO2 22*   < > 21* 25 20*  20*   BUN 25*   < > 20 19 22*  22*   CREATININE 1.8*   < > 1.4 1.5* 1.5*  1.5*   CALCIUM 9.7   < > 9.1 9.2 9.0  9.0    ALBUMIN 3.7  --   --  4.1 3.8   PROT 7.0  --   --  6.8 6.5   BILITOT 0.5  --   --  1.8* 1.6*   ALKPHOS 76  --   --  62 57   ALT 23  --   --  29 29   AST 33  --   --  114* 102*   MG 2.1   < > 2.3 2.7* 2.4  2.4   PHOS 3.8   < > 4.9* 5.1* 5.1*  5.1*    < > = values in this interval not displayed.       ABGs:   Recent Labs   Lab 09/11/19  1421   PH 7.329*   PCO2 48.8*   HCO3 25.7   POCSATURATED 94*   BE 0     CMP:   Recent Labs   Lab 09/11/19  0400 09/11/19  0822 09/12/19  0500   * 140  140 135*   K 4.4 4.5  4.5 4.3    104  104 100   CO2 25 20*  20* 24   * 156*  156* 128*   BUN 19 22*  22* 27*   CREATININE 1.5* 1.5*  1.5* 1.9*   CALCIUM 9.2 9.0  9.0 9.6   PROT 6.8 6.5  --    ALBUMIN 4.1 3.8  --    BILITOT 1.8* 1.6*  --    ALKPHOS 62 57  --    * 102*  --    ALT 29 29  --    ANIONGAP 10 16  16 11   EGFRNONAA 40.6* 40.6*  40.6* 30.5*     Cardiac Markers: No results for input(s): CKMB, TROPONINT, MYOGLOBIN in the last 48 hours.  Coagulation:   Recent Labs   Lab 09/12/19  0500   INR 1.2   APTT 29.8     Lactic Acid: No results for input(s): LACTATE in the last 48 hours.  Troponin: No results for input(s): TROPONINI in the last 48 hours.    Significant Imaging:  I have reviewed all pertinent imaging results/findings within the past 24 hours.

## 2019-09-11 NOTE — ASSESSMENT & PLAN NOTE
Titrate insulin slowly to avoid hypoglycemia as the risk of hypoglycemia increases with decreased creatinine clearance.    Estimated Creatinine Clearance: 59.2 mL/min (A) (based on SCr of 1.5 mg/dL (H)).

## 2019-09-11 NOTE — NURSING
Notified Dr. Walden of UO 25cc/hr, CVP 13.  VAD PIs remain 2.1. Orders to infusing remaining albumin over 1 hour and then given additional 500cc albumin over 2 hours. Will obtain ABG w/lactic at 1400.

## 2019-09-11 NOTE — NURSING
Dr. aWlden and team at bedside. Discussed UO 35-40 for last 2 hours. Discussed CVP 13 and PI 2.3. Will give 500cc albumin slowly over 4 hours and increase lasix to 10mg/hr. Additional orders noted to wean Epi by 0.01mcg/kg/hr every 8 hours. Phos, mag and PTT will be trended q6hr. Will d/c maria r MIMS to place additional orders.

## 2019-09-11 NOTE — ASSESSMENT & PLAN NOTE
NICM EF: 20%, LVEDD: 6.8cm. Repeat echo ordered and pending  RHC: done on admit 9/4/19 RA: 20/ 20/ 18 RV: 60/ 8/ 18 PA: 60/ 32/ 45 PWP: 43/ 70/ 40 . Cardiac output was 2.27 by Fitz. Cardiac index is 1.04 L/min/m2. O2 Sat: PA 34%. AO 95% PVR 2.2 PALMER  - S/p LVAD 9/10  - Management per CTS  - CVP 13 this morning  - Epi 0.05 and dopamine 2.5

## 2019-09-11 NOTE — ANESTHESIA POSTPROCEDURE EVALUATION
Anesthesia Post Evaluation    Patient: Deborah Navas    Procedure(s) Performed: Procedure(s) (LRB):  INSERTION-LEFT VENTRICULAR ASSIST DEVICE (N/A)  INSERTION, GRAFT, PERICARDIUM  CLOSURE, WOUND, STERNUM    Final Anesthesia Type: general  Patient location during evaluation: ICU  Patient participation: Yes- Able to Participate  Level of consciousness: awake and alert and oriented  Post-procedure vital signs: reviewed and stable  Pain management: adequate  Airway patency: patent  PONV status at discharge: No PONV  Anesthetic complications: no      Cardiovascular status: blood pressure returned to baseline, hemodynamically stable and stable  Respiratory status: spontaneous ventilation and nasal cannula  Hydration status: euvolemic  Follow-up not needed.          Vitals Value Taken Time   BP 68/0 9/10/2019  7:15 PM   Temp 38.5 °C (101.3 °F) 9/11/2019  6:00 AM   Pulse 210 9/11/2019  6:29 AM   Resp 25 9/11/2019  6:29 AM   SpO2 96 % 9/11/2019  6:29 AM   Vitals shown include unvalidated device data.      No case tracking events are documented in the log.      Pain/Zaria Score: Pain Rating Prior to Med Admin: 7 (9/11/2019  5:54 AM)  Pain Rating Post Med Admin: 5 (9/11/2019  2:25 AM)

## 2019-09-11 NOTE — PROGRESS NOTES
09/11/2019  Christo Cooper    Current provider:  Jolene Lua MD      I, Christo Cooper, rounded on Deborah Navas to ensure all mechanical assist device settings (IABP or VAD) were appropriate and all parameters were within limits.  I was able to ensure all back up equipment was present, the staff had no issues, and the Perfusion Department daily rounding was complete.    7:22 AM

## 2019-09-12 LAB
ANION GAP SERPL CALC-SCNC: 11 MMOL/L (ref 8–16)
ANION GAP SERPL CALC-SCNC: 11 MMOL/L (ref 8–16)
APTT BLDCRRT: 29 SEC (ref 21–32)
APTT BLDCRRT: 29.1 SEC (ref 21–32)
APTT BLDCRRT: 29.8 SEC (ref 21–32)
BASOPHILS # BLD AUTO: 0.03 K/UL (ref 0–0.2)
BASOPHILS NFR BLD: 0.2 % (ref 0–1.9)
BUN SERPL-MCNC: 27 MG/DL (ref 6–20)
BUN SERPL-MCNC: 28 MG/DL (ref 6–20)
CALCIUM SERPL-MCNC: 9.3 MG/DL (ref 8.7–10.5)
CALCIUM SERPL-MCNC: 9.6 MG/DL (ref 8.7–10.5)
CHLORIDE SERPL-SCNC: 100 MMOL/L (ref 95–110)
CHLORIDE SERPL-SCNC: 101 MMOL/L (ref 95–110)
CO2 SERPL-SCNC: 23 MMOL/L (ref 23–29)
CO2 SERPL-SCNC: 24 MMOL/L (ref 23–29)
CREAT SERPL-MCNC: 1.9 MG/DL (ref 0.5–1.4)
CREAT SERPL-MCNC: 1.9 MG/DL (ref 0.5–1.4)
DIFFERENTIAL METHOD: ABNORMAL
EOSINOPHIL # BLD AUTO: 0 K/UL (ref 0–0.5)
EOSINOPHIL NFR BLD: 0.2 % (ref 0–8)
ERYTHROCYTE [DISTWIDTH] IN BLOOD BY AUTOMATED COUNT: 15.1 % (ref 11.5–14.5)
EST. GFR  (AFRICAN AMERICAN): 35.2 ML/MIN/1.73 M^2
EST. GFR  (AFRICAN AMERICAN): 35.2 ML/MIN/1.73 M^2
EST. GFR  (NON AFRICAN AMERICAN): 30.5 ML/MIN/1.73 M^2
EST. GFR  (NON AFRICAN AMERICAN): 30.5 ML/MIN/1.73 M^2
GLUCOSE SERPL-MCNC: 128 MG/DL (ref 70–110)
GLUCOSE SERPL-MCNC: 99 MG/DL (ref 70–110)
HCT VFR BLD AUTO: 28.2 % (ref 37–48.5)
HGB BLD-MCNC: 8.5 G/DL (ref 12–16)
IMM GRANULOCYTES # BLD AUTO: 0.09 K/UL (ref 0–0.04)
IMM GRANULOCYTES NFR BLD AUTO: 0.7 % (ref 0–0.5)
INR PPP: 1.2 (ref 0.8–1.2)
LDH SERPL L TO P-CCNC: 700 U/L (ref 110–260)
LYMPHOCYTES # BLD AUTO: 0.7 K/UL (ref 1–4.8)
LYMPHOCYTES NFR BLD: 5.3 % (ref 18–48)
MAGNESIUM SERPL-MCNC: 2.3 MG/DL (ref 1.6–2.6)
MAGNESIUM SERPL-MCNC: 2.4 MG/DL (ref 1.6–2.6)
MCH RBC QN AUTO: 28.6 PG (ref 27–31)
MCHC RBC AUTO-ENTMCNC: 30.1 G/DL (ref 32–36)
MCV RBC AUTO: 95 FL (ref 82–98)
MONOCYTES # BLD AUTO: 1.2 K/UL (ref 0.3–1)
MONOCYTES NFR BLD: 9.3 % (ref 4–15)
NEUTROPHILS # BLD AUTO: 10.7 K/UL (ref 1.8–7.7)
NEUTROPHILS NFR BLD: 84.3 % (ref 38–73)
NRBC BLD-RTO: 0 /100 WBC
PHOSPHATE SERPL-MCNC: 4 MG/DL (ref 2.7–4.5)
PHOSPHATE SERPL-MCNC: 4.6 MG/DL (ref 2.7–4.5)
PLATELET # BLD AUTO: 114 K/UL (ref 150–350)
PMV BLD AUTO: 10 FL (ref 9.2–12.9)
POCT GLUCOSE: 134 MG/DL (ref 70–110)
POCT GLUCOSE: 140 MG/DL (ref 70–110)
POCT GLUCOSE: 91 MG/DL (ref 70–110)
POCT GLUCOSE: 98 MG/DL (ref 70–110)
POTASSIUM SERPL-SCNC: 4.1 MMOL/L (ref 3.5–5.1)
POTASSIUM SERPL-SCNC: 4.3 MMOL/L (ref 3.5–5.1)
PROTHROMBIN TIME: 12.5 SEC (ref 9–12.5)
RBC # BLD AUTO: 2.97 M/UL (ref 4–5.4)
SODIUM SERPL-SCNC: 135 MMOL/L (ref 136–145)
SODIUM SERPL-SCNC: 135 MMOL/L (ref 136–145)
VANCOMYCIN SERPL-MCNC: 18.7 UG/ML
WBC # BLD AUTO: 12.74 K/UL (ref 3.9–12.7)

## 2019-09-12 PROCEDURE — 63600175 PHARM REV CODE 636 W HCPCS: Mod: NTX | Performed by: STUDENT IN AN ORGANIZED HEALTH CARE EDUCATION/TRAINING PROGRAM

## 2019-09-12 PROCEDURE — 63600175 PHARM REV CODE 636 W HCPCS: Mod: NTX | Performed by: THORACIC SURGERY (CARDIOTHORACIC VASCULAR SURGERY)

## 2019-09-12 PROCEDURE — 97530 THERAPEUTIC ACTIVITIES: CPT | Mod: NTX

## 2019-09-12 PROCEDURE — 20000000 HC ICU ROOM: Mod: NTX

## 2019-09-12 PROCEDURE — 99232 SBSQ HOSP IP/OBS MODERATE 35: CPT | Mod: NTX,,, | Performed by: INTERNAL MEDICINE

## 2019-09-12 PROCEDURE — 92526 ORAL FUNCTION THERAPY: CPT | Mod: NTX

## 2019-09-12 PROCEDURE — 85610 PROTHROMBIN TIME: CPT | Mod: NTX

## 2019-09-12 PROCEDURE — 83735 ASSAY OF MAGNESIUM: CPT | Mod: 91,NTX

## 2019-09-12 PROCEDURE — 94770 HC EXHALED C02 TEST: CPT | Mod: NTX

## 2019-09-12 PROCEDURE — 85730 THROMBOPLASTIN TIME PARTIAL: CPT | Mod: NTX

## 2019-09-12 PROCEDURE — 85025 COMPLETE CBC W/AUTO DIFF WBC: CPT | Mod: NTX

## 2019-09-12 PROCEDURE — 84100 ASSAY OF PHOSPHORUS: CPT | Mod: 91,NTX

## 2019-09-12 PROCEDURE — 94761 N-INVAS EAR/PLS OXIMETRY MLT: CPT | Mod: NTX

## 2019-09-12 PROCEDURE — 25000003 PHARM REV CODE 250: Mod: NTX | Performed by: STUDENT IN AN ORGANIZED HEALTH CARE EDUCATION/TRAINING PROGRAM

## 2019-09-12 PROCEDURE — 27000248 HC VAD-ADDITIONAL DAY: Mod: NTX

## 2019-09-12 PROCEDURE — 99900035 HC TECH TIME PER 15 MIN (STAT): Mod: NTX

## 2019-09-12 PROCEDURE — 27000221 HC OXYGEN, UP TO 24 HOURS: Mod: NTX

## 2019-09-12 PROCEDURE — 83615 LACTATE (LD) (LDH) ENZYME: CPT | Mod: NTX

## 2019-09-12 PROCEDURE — 99232 PR SUBSEQUENT HOSPITAL CARE,LEVL II: ICD-10-PCS | Mod: NTX,,, | Performed by: INTERNAL MEDICINE

## 2019-09-12 PROCEDURE — 99233 SBSQ HOSP IP/OBS HIGH 50: CPT | Mod: NTX,,, | Performed by: SURGERY

## 2019-09-12 PROCEDURE — 99233 PR SUBSEQUENT HOSPITAL CARE,LEVL III: ICD-10-PCS | Mod: NTX,,, | Performed by: SURGERY

## 2019-09-12 PROCEDURE — 80202 ASSAY OF VANCOMYCIN: CPT | Mod: NTX

## 2019-09-12 PROCEDURE — 63600175 PHARM REV CODE 636 W HCPCS: Mod: NTX | Performed by: INTERNAL MEDICINE

## 2019-09-12 PROCEDURE — C9113 INJ PANTOPRAZOLE SODIUM, VIA: HCPCS | Mod: NTX | Performed by: STUDENT IN AN ORGANIZED HEALTH CARE EDUCATION/TRAINING PROGRAM

## 2019-09-12 PROCEDURE — 93750 INTERROGATION VAD IN PERSON: CPT | Mod: NTX,,, | Performed by: INTERNAL MEDICINE

## 2019-09-12 PROCEDURE — 97535 SELF CARE MNGMENT TRAINING: CPT | Mod: NTX

## 2019-09-12 PROCEDURE — 93750 PR INTERROGATE VENT ASSIST DEV, IN PERSON, W PHYSICIAN ANALYSIS: ICD-10-PCS | Mod: NTX,,, | Performed by: INTERNAL MEDICINE

## 2019-09-12 PROCEDURE — 80048 BASIC METABOLIC PNL TOTAL CA: CPT | Mod: NTX

## 2019-09-12 PROCEDURE — 63600367 HC NITRIC OXIDE PER HOUR: Mod: NTX

## 2019-09-12 PROCEDURE — 25000003 PHARM REV CODE 250: Mod: NTX | Performed by: THORACIC SURGERY (CARDIOTHORACIC VASCULAR SURGERY)

## 2019-09-12 RX ORDER — GLUCAGON 1 MG
1 KIT INJECTION
Status: DISCONTINUED | OUTPATIENT
Start: 2019-09-12 | End: 2019-09-18

## 2019-09-12 RX ORDER — FUROSEMIDE 10 MG/ML
10 INJECTION INTRAMUSCULAR; INTRAVENOUS ONCE
Status: COMPLETED | OUTPATIENT
Start: 2019-09-12 | End: 2019-09-12

## 2019-09-12 RX ORDER — IBUPROFEN 200 MG
16 TABLET ORAL
Status: DISCONTINUED | OUTPATIENT
Start: 2019-09-12 | End: 2019-09-18

## 2019-09-12 RX ORDER — PANTOPRAZOLE SODIUM 40 MG/1
40 TABLET, DELAYED RELEASE ORAL DAILY
Status: DISCONTINUED | OUTPATIENT
Start: 2019-09-13 | End: 2019-10-01 | Stop reason: HOSPADM

## 2019-09-12 RX ORDER — IBUPROFEN 200 MG
24 TABLET ORAL
Status: DISCONTINUED | OUTPATIENT
Start: 2019-09-12 | End: 2019-09-18

## 2019-09-12 RX ORDER — ONDANSETRON 2 MG/ML
4 INJECTION INTRAMUSCULAR; INTRAVENOUS ONCE
Status: COMPLETED | OUTPATIENT
Start: 2019-09-12 | End: 2019-09-12

## 2019-09-12 RX ORDER — HEPARIN SODIUM 10000 [USP'U]/100ML
400 INJECTION, SOLUTION INTRAVENOUS CONTINUOUS
Status: DISCONTINUED | OUTPATIENT
Start: 2019-09-12 | End: 2019-09-16

## 2019-09-12 RX ORDER — POLYETHYLENE GLYCOL 3350 17 G/17G
17 POWDER, FOR SOLUTION ORAL DAILY
Status: DISCONTINUED | OUTPATIENT
Start: 2019-09-12 | End: 2019-09-12

## 2019-09-12 RX ORDER — INSULIN ASPART 100 [IU]/ML
0-5 INJECTION, SOLUTION INTRAVENOUS; SUBCUTANEOUS
Status: DISCONTINUED | OUTPATIENT
Start: 2019-09-12 | End: 2019-09-18

## 2019-09-12 RX ORDER — WARFARIN 2 MG/1
2 TABLET ORAL ONCE
Status: COMPLETED | OUTPATIENT
Start: 2019-09-12 | End: 2019-09-12

## 2019-09-12 RX ORDER — POLYETHYLENE GLYCOL 3350 17 G/17G
17 POWDER, FOR SOLUTION ORAL DAILY
Status: DISCONTINUED | OUTPATIENT
Start: 2019-09-12 | End: 2019-10-01 | Stop reason: HOSPADM

## 2019-09-12 RX ADMIN — OXYCODONE HYDROCHLORIDE 10 MG: 10 TABLET ORAL at 02:09

## 2019-09-12 RX ADMIN — PANTOPRAZOLE SODIUM 40 MG: 40 INJECTION, POWDER, LYOPHILIZED, FOR SOLUTION INTRAVENOUS at 09:09

## 2019-09-12 RX ADMIN — OXYCODONE HYDROCHLORIDE 10 MG: 10 TABLET ORAL at 01:09

## 2019-09-12 RX ADMIN — OXYCODONE HYDROCHLORIDE 10 MG: 10 TABLET ORAL at 11:09

## 2019-09-12 RX ADMIN — MUPIROCIN: 20 OINTMENT TOPICAL at 09:09

## 2019-09-12 RX ADMIN — ONDANSETRON 4 MG: 2 INJECTION INTRAMUSCULAR; INTRAVENOUS at 03:09

## 2019-09-12 RX ADMIN — WARFARIN SODIUM 2 MG: 2 TABLET ORAL at 05:09

## 2019-09-12 RX ADMIN — HEPARIN SODIUM 400 UNITS/HR: 10000 INJECTION, SOLUTION INTRAVENOUS at 08:09

## 2019-09-12 RX ADMIN — Medication: at 09:09

## 2019-09-12 RX ADMIN — FUROSEMIDE 10 MG/HR: 10 INJECTION, SOLUTION INTRAMUSCULAR; INTRAVENOUS at 08:09

## 2019-09-12 RX ADMIN — OXYCODONE HYDROCHLORIDE 10 MG: 10 TABLET ORAL at 09:09

## 2019-09-12 RX ADMIN — FUROSEMIDE 10 MG: 10 INJECTION, SOLUTION INTRAMUSCULAR; INTRAVENOUS at 08:09

## 2019-09-12 RX ADMIN — Medication: at 01:09

## 2019-09-12 RX ADMIN — DOBUTAMINE IN DEXTROSE 4 MCG/KG/MIN: 200 INJECTION, SOLUTION INTRAVENOUS at 05:09

## 2019-09-12 RX ADMIN — ASPIRIN 325 MG: 325 TABLET, COATED ORAL at 09:09

## 2019-09-12 RX ADMIN — OXYCODONE HYDROCHLORIDE 10 MG: 10 TABLET ORAL at 05:09

## 2019-09-12 RX ADMIN — HYDROXYZINE HYDROCHLORIDE 25 MG: 25 TABLET, FILM COATED ORAL at 03:09

## 2019-09-12 RX ADMIN — ONDANSETRON 4 MG: 2 INJECTION INTRAMUSCULAR; INTRAVENOUS at 05:09

## 2019-09-12 RX ADMIN — FENTANYL CITRATE 25 MCG: 50 INJECTION INTRAMUSCULAR; INTRAVENOUS at 05:09

## 2019-09-12 RX ADMIN — POLYETHYLENE GLYCOL 3350 17 G: 17 POWDER, FOR SOLUTION ORAL at 09:09

## 2019-09-12 RX ADMIN — DOCUSATE SODIUM 200 MG: 100 CAPSULE, LIQUID FILLED ORAL at 09:09

## 2019-09-12 RX ADMIN — HYDROXYZINE HYDROCHLORIDE 25 MG: 25 TABLET, FILM COATED ORAL at 09:09

## 2019-09-12 RX ADMIN — HYDROXYZINE HYDROCHLORIDE 25 MG: 25 TABLET, FILM COATED ORAL at 11:09

## 2019-09-12 RX ADMIN — FUROSEMIDE 10 MG: 10 INJECTION, SOLUTION INTRAMUSCULAR; INTRAVENOUS at 02:09

## 2019-09-12 RX ADMIN — ONDANSETRON 4 MG: 2 INJECTION INTRAMUSCULAR; INTRAVENOUS at 09:09

## 2019-09-12 RX ADMIN — VENLAFAXINE 150 MG: 37.5 TABLET ORAL at 09:09

## 2019-09-12 RX ADMIN — OXYCODONE HYDROCHLORIDE 10 MG: 10 TABLET ORAL at 07:09

## 2019-09-12 NOTE — PT/OT/SLP PROGRESS
Occupational Therapy   Treatment    Name: Deborah Navas  MRN: [id number]  Admitting Diagnosis:  Acute on chronic combined systolic and diastolic heart failure  2 Days Post-Op    Recommendations:     Discharge Recommendations: home  Discharge Equipment Recommendations:  none  Barriers to discharge:  None    Assessment:     Deborah Navas is a 48 y.o. female with a medical diagnosis of Acute on chronic combined systolic and diastolic heart failure.  She was able to perform supine/sit T/F c mod A and sit/stand and bed/chair T/F c CGA. Able to perform self-test c mod I.  Performed UB/LB dressing c total assist 2* IV lines.  Pt had c/o dizziness while sitting up on EOB.  Performance deficits affecting function are weakness, impaired endurance, impaired self care skills, impaired functional mobilty, decreased upper extremity function.     Rehab Prognosis:  Good; patient would benefit from acute skilled OT services to address these deficits and reach maximum level of function.       Plan:     Patient to be seen 6 x/week to address the above listed problems via self-care/home management, therapeutic activities, therapeutic exercises  · Plan of Care Expires: 10/11/19  · Plan of Care Reviewed with: patient    Subjective     Pain/Comfort:  · Pain Rating 1: 0/10    Objective:     Communicated with: RN prior to session.  Patient found supine with arterial line, blood pressure cuff, parrish catheter, LVAD, oxygen, peripheral IV, pulse ox (continuous), telemetry, wound vac upon OT entry to room.    General Precautions: Standard, fall, LVAD, sternal   Orthopedic Precautions:N/A   Braces: N/A     Occupational Performance:     Bed Mobility:    · Patient completed Supine to Sit with moderate assistance     Functional Mobility/Transfers:  · Patient completed Sit <> Stand Transfer with contact guard assistance  with  no assistive device   · Patient completed Bed <> Chair Transfer using Stand Pivot technique with contact  guard assistance with no assistive device      Activities of Daily Living:  · Upper Body Dressing: total assistance to don hospital gown.  · Lower Body Dressing: total assistance to don socks.      [facility] 6 Click ADL: 14    Treatment & Education:  Pt was able to perform self-test c mod I.    Patient left up in chair with all lines intact, call button in reach, RN notified and father presentEducation:      GOALS:   Multidisciplinary Problems     Occupational Therapy Goals        Problem: Occupational Therapy Goal    Goal Priority Disciplines Outcome Interventions   Occupational Therapy Goal     OT, PT/OT     Description:  Goals to be met by: 10/11/19     Patient will increase functional independence with ADLs by performing:    UE Dressing with [place].  LE Dressing with [place].  Grooming while standing at sink with [place].  Toileting from toilet with [place] for hygiene and clothing management.   Bathing from  shower chair/bench with [place].  Toilet transfer to toilet with [place].  Increased functional strength to WFL for B UE.  Upper extremity exercise program x15 reps per handout, with independence.                      Time Tracking:     OT Date of Treatment: 09/12/19  OT Start Time: 0945  OT Stop Time: 1015  OT Total Time (min): 30 min    Billable Minutes:Therapeutic Activity 30    JAGJIT Cordero  9/12/2019

## 2019-09-12 NOTE — NURSING
Dr. Walden at bedside with team. Discussed CVP 14, UO . Epi turned off. Orders noted for 10mg Lasix IVP bolus and to increase Lasix gtt to 10mg/hr, incerase Heparin gtt to 400 units/hr. Will facilitate PT getting pt OOB to chair x 1 hr.

## 2019-09-12 NOTE — PROGRESS NOTES
Patient AAOx3 with father at bedside. Patient doing well, complaints of some pain. Discussed batteries and VAD parameters with patient today. Encouraged patient to begin reading over handbook and watching videos. Will continue to monitor.

## 2019-09-12 NOTE — PROGRESS NOTES
Patient AAOx3 with mother at bedside. Patient doing well, complaints of some pain. Discussed batteries and VAD parameters with patient today. Encouraged patient to begin reading over handbook and watching videos. Will continue to monitor.

## 2019-09-12 NOTE — SUBJECTIVE & OBJECTIVE
"Interval HPI:   Overnight events: Remains in ICU. NOLVIA. LVAD. BG well controlled without insulin.   Eating:   <25% clears  Nausea: No  Hypoglycemia and intervention: No  Fever: no  TPN and/or TF: No      BP (!) 88/0 (BP Location: Left arm, Patient Position: Lying)   Pulse 90   Temp 99 °F (37.2 °C) (Core Bladder)   Resp 14   Ht 5' 7" (1.702 m)   Wt 115 kg (253 lb 8.5 oz)   LMP  (Within Years)   SpO2 96%   Breastfeeding? No   BMI 39.71 kg/m²     Labs Reviewed and Include    Recent Labs   Lab 09/13/19  0400   GLU 98   CALCIUM 9.6   ALBUMIN 3.6   PROT 6.3      K 3.5   CO2 25   CL 98   BUN 31*   CREATININE 1.6*   ALKPHOS 66   *   *   BILITOT 1.8*     Lab Results   Component Value Date    WBC 10.45 09/13/2019    HGB 8.3 (L) 09/13/2019    HCT 27.1 (L) 09/13/2019    MCV 95 09/13/2019     (L) 09/13/2019     Recent Labs   Lab 09/06/19  0846   TSH 2.290   FREET4 1.49     Lab Results   Component Value Date    HGBA1C 6.5 (H) 09/06/2019       Nutritional status:   Body mass index is 39.71 kg/m².  Lab Results   Component Value Date    ALBUMIN 3.6 09/13/2019    ALBUMIN 3.8 09/11/2019    ALBUMIN 4.1 09/11/2019     Lab Results   Component Value Date    PREALBUMIN 22 09/11/2019    PREALBUMIN 32 09/06/2019       Estimated Creatinine Clearance: 55.7 mL/min (A) (based on SCr of 1.6 mg/dL (H)).    Accu-Checks  Recent Labs     09/11/19  0608 09/11/19  0842 09/11/19  1230 09/11/19  1419 09/11/19  1646 09/11/19  2313 09/12/19  0522 09/12/19  0813 09/12/19  1333 09/12/19  1811   POCTGLUCOSE 131* 144* 156* 151* 126* 135* 134* 140* 98 91       Current Medications and/or Treatments Impacting Glycemic Control  Immunotherapy:    Immunosuppressants     None        Steroids:   Hormones (From admission, onward)    None        Pressors:    Autonomic Drugs (From admission, onward)    Start     Stop Route Frequency Ordered    09/11/19 1215  EPINEPHrine (ADRENALIN) 5 mg in sodium chloride 0.9% 250 mL infusion   "   Question Answer Comment   Titrate by: (in mcg/kg/min) 0.03    Titrate interval: (in minutes) 5    Titrate to maintain: (SBP or MAP or Cardiac Index) MAP    Greater than: (in mmHg) 70    Maximum dose: (in mcg/kg/min) 0.1        -- IV Continuous 09/11/19 1213        Hyperglycemia/Diabetes Medications:   Antihyperglycemics (From admission, onward)    Start     Stop Route Frequency Ordered    09/12/19 1306  insulin aspart U-100 pen 0-5 Units      -- SubQ Before meals & nightly PRN 09/12/19 1206

## 2019-09-12 NOTE — PT/OT/SLP PROGRESS
"Physical Therapy Treatment    Patient Name:  Deborah Navas   MRN:  [id number]    Recommendations:     Discharge Recommendations:  home(Simultaneous filing. User may not have seen previous data.)   Discharge Equipment Recommendations: none   Barriers to discharge: Decreased caregiver support    Assessment:     Deborah Navas is a 48 y.o. female admitted with a medical diagnosis of Acute on chronic combined systolic and diastolic heart failure.  She presents with the following impairments/functional limitations:  weakness, impaired endurance, gait instability, impaired balance, pain, impaired functional mobilty, impaired cardiopulmonary response to activity, impaired self care skills. Pt tolerated activity with minimum assistance to transfer from chair to bed. Pt mobility limited by increased pain with movement. Pt reporting constant dizziness, pt attributes it to withdrawal from mood stabilization medication. Pt is progressing well toward goals and would continue to benefit from acute skilled therapy intervention to address deficits and progress toward prior level of function.       Rehab Prognosis: Good; patient would benefit from acute skilled PT services to address these deficits and reach maximum level of function.    Recent Surgery: Procedure(s) (LRB):  INSERTION-LEFT VENTRICULAR ASSIST DEVICE (N/A)  INSERTION, GRAFT, PERICARDIUM  CLOSURE, WOUND, STERNUM 2 Days Post-Op    Plan:     During this hospitalization, patient to be seen 6 x/week to address the identified rehab impairments via gait training, therapeutic activities, therapeutic exercises, neuromuscular re-education and progress toward the following goals:    · Plan of Care Expires:  10/10/19    Subjective     Chief Complaint: Pt c/o dizziness, reports "it is more feeling like my eyes going up and down"   Patient/Family Comments/goals: to get better and return home   Pain/Comfort:  · Pain Rating 1: (Pt reported pain at sternal incision " site with movement, did not quantify  Simultaneous filing. User may not have seen previous data.)  · Location - Orientation 1: midline  · Location 1: sternal  · Pain Addressed 1: Pre-medicate for activity, Reposition, Distraction      Objective:     Communicated with RN prior to session.  Patient found up in chair with arterial line, blood pressure cuff, LVAD, pulse ox (continuous), telemetry, peripheral IV, wound vac, parrish catheter, central line, chest tube upon PT entry to room.     General Precautions: Standard, fall, LVAD, sternal(Simultaneous filing. User may not have seen previous data.)   Orthopedic Precautions:N/A   Braces: N/A     Functional Mobility:  · Bed Mobility:     · Sit to Supine: moderate assistance  · Transfers:     · Sit to Stand:  minimum assistance with no AD  · Chair to bed: minimum assistance with  no AD  using  Stand Pivot  · Gait: Pt ambulated 4 steps from chair to bed with minimum assistance with no AD. Pt with small step size, decreased foot clearance, no LOB, no SOB, no change in dizziness.       AM-PAC 6 CLICK MOBILITY  Turning over in bed (including adjusting bedclothes, sheets and blankets)?: 2  Sitting down on and standing up from a chair with arms (e.g., wheelchair, bedside commode, etc.): 3  Moving from lying on back to sitting on the side of the bed?: 2  Moving to and from a bed to a chair (including a wheelchair)?: 3  Need to walk in hospital room?: 3  Climbing 3-5 steps with a railing?: 2  Basic Mobility Total Score: 15       Therapeutic Activities and Exercises:   Pt educated on role of PT/POC. Pt verbalized understanding.   Pt encouraged to only perform OOB mobility with assistance from nursing/therapy. Pt agreeable.   LVAD to wall power throughout session, no alarms sounded. Controller secured with strap during mobility.   Pt encouraged to move B UE and B LE while in bed to continue to improve strength and endurance. Pt agreeable.     Patient left HOB elevated with all lines  intact, call button in reach and RN notified..    GOALS:   Multidisciplinary Problems     Physical Therapy Goals        Problem: Physical Therapy Goal    Goal Priority Disciplines Outcome Goal Variances Interventions   Physical Therapy Goal     PT, PT/OT Ongoing (interventions implemented as appropriate)     Description:  Goals to be met by: 10/1/2019    Patient will increase functional independence with mobility by performin. Supine to sit with Contact Guard Assistance - not met  2. Sit to stand transfer with Supervision - not met  3. Gait  x 200 feet with Supervision  - not met  4. Ascend/descend 2 stair with Contact Guard Assistance - not met                      Time Tracking:     PT Received On: 19  PT Start Time: 1058     PT Stop Time: 1122  PT Total Time (min): 24 min     Billable Minutes: Therapeutic Activity 24 mins     Treatment Type: Treatment  PT/PTA: PT           Consuelo Toledo, PT  2019

## 2019-09-12 NOTE — SUBJECTIVE & OBJECTIVE
Interval History: febrile at 100.6 at 200. Patient reporting pain at surgical sites. CVP 11 this AM. PI events on interrogation.    Continuous Infusions:   dextrose 5 % and 0.45 % NaCl with KCl 40 mEq 10 mL/hr at 09/12/19 0600    DOBUTamine 4 mcg/kg/min (09/12/19 0800)    epinephrine Stopped (09/12/19 0800)    furosemide (LASIX) 2 mg/mL infusion (non-titrating) 10 mg/hr (09/12/19 0806)    heparin (porcine) in 5 % dex 400 Units/hr (09/12/19 0807)    hydromorphone in 0.9 % NaCl 6 mg/30 ml      nicardipine Stopped (09/11/19 0900)     Scheduled Meds:   aspirin  325 mg Oral Daily    docusate sodium  200 mg Oral QHS    mupirocin   Nasal BID    pantoprazole  40 mg Intravenous Daily    venlafaxine  150 mg Oral Daily     PRN Meds:albumin human 5%, albuterol sulfate, bisacodyl, Dextrose 10% Bolus, Dextrose 10% Bolus, Dextrose 10% Bolus, Dextrose 10% Bolus, Dextrose 10% Bolus, fentaNYL, glucagon (human recombinant), hydrALAZINE, HYDROmorphone, hydrOXYzine HCl, insulin aspart U-100, magnesium hydroxide 400 mg/5 ml, magnesium sulfate IVPB, naloxone, ondansetron, oxyCODONE, oxyCODONE, potassium chloride, potassium chloride, sodium chloride 0.9%    Review of patient's allergies indicates:   Allergen Reactions    Adhesive Blisters     Reaction to area in chest and up only    Codeine Itching     Objective:     Vital Signs (Most Recent):  Temp: 100.2 °F (37.9 °C) (09/12/19 0745)  Pulse: 91 (09/12/19 0745)  Resp: (!) 27 (09/12/19 0745)  BP: 99/66 (09/11/19 2300)  SpO2: (!) 90 % (09/12/19 0600) Vital Signs (24h Range):  Temp:  [98.9 °F (37.2 °C)-100.6 °F (38.1 °C)] 100.2 °F (37.9 °C)  Pulse:  [] 91  Resp:  [11-60] 27  SpO2:  [87 %-98 %] 90 %  BP: (85-99)/(60-66) 99/66  Arterial Line BP: (71-97)/(57-97) 84/74     Patient Vitals for the past 72 hrs (Last 3 readings):   Weight   09/12/19 0400 113.1 kg (249 lb 5.4 oz)   09/11/19 0518 114.2 kg (251 lb 12.3 oz)   09/10/19 1300 104.4 kg (230 lb 2.6 oz)     Body mass index  is 39.05 kg/m².      Intake/Output Summary (Last 24 hours) at 9/12/2019 0846  Last data filed at 9/12/2019 0800  Gross per 24 hour   Intake 3132.3 ml   Output 1551 ml   Net 1581.3 ml     Physical Exam   Constitutional: She appears well-developed and well-nourished. No distress.   Eyes: Conjunctivae are normal. Right eye exhibits no discharge. Left eye exhibits no discharge.   Neck: No JVD present.   Cardiovascular: Normal rate, regular rhythm and normal heart sounds.   Smooth LVAD hum  CVC RIJ   Pulmonary/Chest: Effort normal and breath sounds normal. No stridor. No respiratory distress. She has no wheezes.   Abdominal: Soft. Bowel sounds are normal.   Musculoskeletal: Normal range of motion. She exhibits no edema or deformity.   Neurological: She is alert.   Skin: Skin is warm and dry. Capillary refill takes less than 2 seconds. She is not diaphoretic. No erythema.   Nursing note and vitals reviewed.      Significant Labs:  CBC:  Recent Labs   Lab 09/11/19  0400 09/11/19  0735 09/11/19  0822 09/12/19  0500   WBC 12.46  12.46  --  14.33* 12.74*   RBC 3.37*  3.37*  --  3.43* 2.97*   HGB 9.8*  9.8*  --  10.0* 8.5*   HCT 32.5*  32.5* 33* 32.9* 28.2*   *  144*  --  136* 114*   MCV 96  96  --  96 95   MCH 29.1  29.1  --  29.2 28.6   MCHC 30.2*  30.2*  --  30.4* 30.1*     BNP:  Recent Labs   Lab 09/06/19  1834 09/11/19  0400   * 976*     CMP:  Recent Labs   Lab 09/10/19  0536  09/11/19  0400 09/11/19  0822 09/12/19  0500   *   < > 156* 156*  156* 128*   CALCIUM 9.7   < > 9.2 9.0  9.0 9.6   ALBUMIN 3.7  --  4.1 3.8  --    PROT 7.0  --  6.8 6.5  --       < > 135* 140  140 135*   K 3.8   < > 4.4 4.5  4.5 4.3   CO2 22*   < > 25 20*  20* 24      < > 100 104  104 100   BUN 25*   < > 19 22*  22* 27*   CREATININE 1.8*   < > 1.5* 1.5*  1.5* 1.9*   ALKPHOS 76  --  62 57  --    ALT 23  --  29 29  --    AST 33  --  114* 102*  --    BILITOT 0.5  --  1.8* 1.6*  --     < > = values in  this interval not displayed.      Coagulation:   Recent Labs   Lab 09/11/19  0400 09/11/19  0822  09/11/19  1801 09/11/19  2308 09/12/19  0500   INR 1.0 1.1  --   --   --  1.2   APTT 23.1 24.6   < > 30.1 29.1 29.8    < > = values in this interval not displayed.     LDH:  Recent Labs   Lab 09/11/19  0822 09/12/19  0500   * 700*     Microbiology:  Microbiology Results (last 7 days)     Procedure Component Value Units Date/Time    Blood culture [[id number]] Collected:  09/11/19 0832    Order Status:  Completed Specimen:  Blood from Line, Jugular, Internal Right Updated:  09/11/19 1545     Blood Culture, Routine No Growth to date    Blood culture [[id number]] Collected:  09/11/19 0810    Order Status:  Completed Specimen:  Blood from Peripheral, Antecubital, Left Updated:  09/11/19 1545     Blood Culture, Routine No Growth to date    HPV High Risk Genotypes, PCR [[id number]]     Order Status:  Completed     Blood culture [[id number]] Collected:  09/11/19 0820    Order Status:  Sent Specimen:  Blood from Line, Jugular, Internal Left Updated:  09/11/19 0822          I have reviewed all pertinent labs within the past 24 hours.    Estimated Creatinine Clearance: 46.5 mL/min (A) (based on SCr of 1.9 mg/dL (H)).    Diagnostic Results:  I have reviewed and interpreted all pertinent imaging results/findings within the past 24 hours.

## 2019-09-12 NOTE — NURSING
SHIFT EVENTS: No acute events this shift    NEUROLOGICAL: AAO x4, moves independently, pain control with IV tylenol, PO oxycodone, and PCA dilaudid    CARDIOVASCULAR: 100% Vpaced, arterial MAPs 60-80, CVP 18-20-17 with 500cc albumin given as ordered, DP pulses palpable, otherwise pulses dopplered, HM3 at 5300, no alarms, epi weaned to 0.01 in 0100 hour, plan to complete wean off Q8    PULMONARY: Adequate saturations on 4L via NC, nitric weaned off by RT as ordered    GENITOURINARY: Adequate clear orange urine output throughout the night    GASTROINTESTINAL: Hypoactive bowel sounds, no BM or gas this shift    NUTRITION/ENDOCRINE: Sips/chips this shift, intermittent complaints of nausea more so related to pain, accuchecks without supplemental need for insulin    SKIN/WOUNDS: MSI remains intact with Provena wound vac in place, CT x2 to -20 suction, thin ss output, no new pressure related breakdown noted    ACTIVITY: Repositions independently in bed    SOCIAL SUPPORT: Mother and father at bedside last night, all verbalize understanding of plan moving forward

## 2019-09-12 NOTE — PROGRESS NOTES
09/12/2019  Terra Porter    Current provider:  Shar Walden MD      I, Terra Porter, rounded on Deborah Navas to ensure all mechanical assist device settings (IABP or VAD) were appropriate and all parameters were within limits.  I was able to ensure all back up equipment was present, the staff had no issues, and the Perfusion Department daily rounding was complete.    8:58 AM

## 2019-09-12 NOTE — PLAN OF CARE
Problem: Occupational Therapy Goal  Goal: Occupational Therapy Goal  Goals to be met by: 10/11/19     Patient will increase functional independence with ADLs by performing:    UE Dressing with [place].  LE Dressing with [place].  Grooming while standing at sink with [place].  Toileting from toilet with [place] for hygiene and clothing management.   Bathing from  shower chair/bench with [place].  Toilet transfer to toilet with [place].  Increased functional strength to WFL for B UE.  Upper extremity exercise program x15 reps per handout, with independence.     Cont. POC.

## 2019-09-12 NOTE — PROGRESS NOTES
[facility]  Heart Transplant  Progress Note    Patient Name: Deborah Navas  MRN: [id number]  Admission Date: 9/4/2019  Hospital Length of Stay: 8 days  Attending Physician: Shar Walden MD  Primary Care Provider: Primary Doctor No  Principal Problem:Acute on chronic combined systolic and diastolic heart failure    Subjective:     Interval History: febrile at 100.6 at 200. Patient reporting pain at surgical sites. CVP 11 this AM. PI events on interrogation.    Continuous Infusions:   dextrose 5 % and 0.45 % NaCl with KCl 40 mEq 10 mL/hr at 09/12/19 0600    DOBUTamine 4 mcg/kg/min (09/12/19 0800)    epinephrine Stopped (09/12/19 0800)    furosemide (LASIX) 2 mg/mL infusion (non-titrating) 10 mg/hr (09/12/19 0806)    heparin (porcine) in 5 % dex 400 Units/hr (09/12/19 0807)    hydromorphone in 0.9 % NaCl 6 mg/30 ml      nicardipine Stopped (09/11/19 0900)     Scheduled Meds:   aspirin  325 mg Oral Daily    docusate sodium  200 mg Oral QHS    mupirocin   Nasal BID    pantoprazole  40 mg Intravenous Daily    venlafaxine  150 mg Oral Daily     PRN Meds:albumin human 5%, albuterol sulfate, bisacodyl, Dextrose 10% Bolus, Dextrose 10% Bolus, Dextrose 10% Bolus, Dextrose 10% Bolus, Dextrose 10% Bolus, fentaNYL, glucagon (human recombinant), hydrALAZINE, HYDROmorphone, hydrOXYzine HCl, insulin aspart U-100, magnesium hydroxide 400 mg/5 ml, magnesium sulfate IVPB, naloxone, ondansetron, oxyCODONE, oxyCODONE, potassium chloride, potassium chloride, sodium chloride 0.9%    Review of patient's allergies indicates:   Allergen Reactions    Adhesive Blisters     Reaction to area in chest and up only    Codeine Itching     Objective:     Vital Signs (Most Recent):  Temp: 100.2 °F (37.9 °C) (09/12/19 0745)  Pulse: 91 (09/12/19 0745)  Resp: (!) 27 (09/12/19 0745)  BP: 99/66 (09/11/19 2300)  SpO2: (!) 90 % (09/12/19 0600) Vital Signs (24h Range):  Temp:  [98.9 °F (37.2 °C)-100.6 °F (38.1 °C)]  100.2 °F (37.9 °C)  Pulse:  [] 91  Resp:  [11-60] 27  SpO2:  [87 %-98 %] 90 %  BP: (85-99)/(60-66) 99/66  Arterial Line BP: (71-97)/(57-97) 84/74     Patient Vitals for the past 72 hrs (Last 3 readings):   Weight   09/12/19 0400 113.1 kg (249 lb 5.4 oz)   09/11/19 0518 114.2 kg (251 lb 12.3 oz)   09/10/19 1300 104.4 kg (230 lb 2.6 oz)     Body mass index is 39.05 kg/m².      Intake/Output Summary (Last 24 hours) at 9/12/2019 0846  Last data filed at 9/12/2019 0800  Gross per 24 hour   Intake 3132.3 ml   Output 1551 ml   Net 1581.3 ml     Physical Exam   Constitutional: She appears well-developed and well-nourished. No distress.   Eyes: Conjunctivae are normal. Right eye exhibits no discharge. Left eye exhibits no discharge.   Neck: No JVD present.   Cardiovascular: Normal rate, regular rhythm and normal heart sounds.   Smooth LVAD hum  CVC RIJ   Pulmonary/Chest: Effort normal and breath sounds normal. No stridor. No respiratory distress. She has no wheezes.   Abdominal: Soft. Bowel sounds are normal.   Musculoskeletal: Normal range of motion. She exhibits no edema or deformity.   Neurological: She is alert.   Skin: Skin is warm and dry. Capillary refill takes less than 2 seconds. She is not diaphoretic. No erythema.   Nursing note and vitals reviewed.      Significant Labs:  CBC:  Recent Labs   Lab 09/11/19  0400 09/11/19  0735 09/11/19  0822 09/12/19  0500   WBC 12.46  12.46  --  14.33* 12.74*   RBC 3.37*  3.37*  --  3.43* 2.97*   HGB 9.8*  9.8*  --  10.0* 8.5*   HCT 32.5*  32.5* 33* 32.9* 28.2*   *  144*  --  136* 114*   MCV 96  96  --  96 95   MCH 29.1  29.1  --  29.2 28.6   MCHC 30.2*  30.2*  --  30.4* 30.1*     BNP:  Recent Labs   Lab 09/06/19  1834 09/11/19  0400   * 976*     CMP:  Recent Labs   Lab 09/10/19  0536  09/11/19  0400 09/11/19  0822 09/12/19  0500   *   < > 156* 156*  156* 128*   CALCIUM 9.7   < > 9.2 9.0  9.0 9.6   ALBUMIN 3.7  --  4.1 3.8  --    PROT 7.0  --   6.8 6.5  --       < > 135* 140  140 135*   K 3.8   < > 4.4 4.5  4.5 4.3   CO2 22*   < > 25 20*  20* 24      < > 100 104  104 100   BUN 25*   < > 19 22*  22* 27*   CREATININE 1.8*   < > 1.5* 1.5*  1.5* 1.9*   ALKPHOS 76  --  62 57  --    ALT 23  --  29 29  --    AST 33  --  114* 102*  --    BILITOT 0.5  --  1.8* 1.6*  --     < > = values in this interval not displayed.      Coagulation:   Recent Labs   Lab 09/11/19  0400 09/11/19  0822  09/11/19  1801 09/11/19  2308 09/12/19  0500   INR 1.0 1.1  --   --   --  1.2   APTT 23.1 24.6   < > 30.1 29.1 29.8    < > = values in this interval not displayed.     LDH:  Recent Labs   Lab 09/11/19 0822 09/12/19  0500   * 700*     Microbiology:  Microbiology Results (last 7 days)     Procedure Component Value Units Date/Time    Blood culture [[id number]] Collected:  09/11/19 0832    Order Status:  Completed Specimen:  Blood from Line, Jugular, Internal Right Updated:  09/11/19 1545     Blood Culture, Routine No Growth to date    Blood culture [[id number]] Collected:  09/11/19 0810    Order Status:  Completed Specimen:  Blood from Peripheral, Antecubital, Left Updated:  09/11/19 1545     Blood Culture, Routine No Growth to date    HPV High Risk Genotypes, PCR [[id number]]     Order Status:  Completed     Blood culture [[id number]] Collected:  09/11/19 0820    Order Status:  Sent Specimen:  Blood from Line, Jugular, Internal Left Updated:  09/11/19 0822          I have reviewed all pertinent labs within the past 24 hours.    Estimated Creatinine Clearance: 46.5 mL/min (A) (based on SCr of 1.9 mg/dL (H)).    Diagnostic Results:  I have reviewed and interpreted all pertinent imaging results/findings within the past 24 hours.    Assessment and Plan:     No notes on file    * Acute on chronic combined systolic and diastolic heart failure  NICM EF: 20%, LVEDD: 6.8cm. Repeat echo ordered and pending  RHC: done on admit 9/4/19 RA: 20/ 20/ 18 RV: 60/ 8/ 18 PA: 60/ 32/  45 PWP: 43/ 70/ 40 . Cardiac output was 2.27 by Fitz. Cardiac index is 1.04 L/min/m2. O2 Sat: PA 34%. AO 95% PVR 2.2 PALMER  - S/p LVAD 9/10  - VAD settings 9/12: speed 5300 / flow 4.3 / power 3.7 / PI 2.9  - Management per CTS  - CVP 14 this morning  - Epi 0.01 and  4    GLO on CKD (chronic kidney disease)  Baseline cr around 1.3-1.4 in early 2019  - Cr 1.9 today  - Diuresing  - Renally dose meds  - Avoid nephrotoxic agents    Abnormal CT scan  -focal opacity found at right base.  DDx: atelectasis.  Recommending repeat CT in 3 months    Enlarged thyroid  Ultrasound done and showed enlarged multinodular thyroid which warrants surveillance in one year.   - TSH and fT4 wnl    Ventricular tachyarrhythmia  - Amiodarone 200 mg PO BID  - Goal K >4 and Mg >2  - Monitor on telemetry    Jatinder Alves MD  Heart Transplant  [facility]

## 2019-09-12 NOTE — ASSESSMENT & PLAN NOTE
Ms. Navas is our 49yo female who is status post LVAD placement on 9/10/2019.    Neuro:   - Alert and oriented  - PCA pump  - Venlafaxine    Cardiac:  - continue allyson  - continue CVC  - CT output 220/24hrs on right and 48cc/24hrs on left, continue to wall suction, will remove if output remains low after OOBTC  - Maintain pump speed at 5300rpm  - Dobutamine to 4mcg/kg/min    Pulmonary:   - Extubated and doing well on 5L NC  - Wean oxygen requirements as tolerated  - Pulmonary toilet    Oxygen Concentration (%):  [32] 32    Renal:   - monitor Is and Os  - BUN/Cr   - Increase lasix drip to 10mg/hour  BUN, Bld   Date Value Ref Range Status   09/12/2019 27 (H) 6 - 20 mg/dL Final   09/11/2019 22 (H) 6 - 20 mg/dL Final   09/11/2019 22 (H) 6 - 20 mg/dL Final     Creatinine   Date Value Ref Range Status   09/12/2019 1.9 (H) 0.5 - 1.4 mg/dL Final   09/11/2019 1.5 (H) 0.5 - 1.4 mg/dL Final   09/11/2019 1.5 (H) 0.5 - 1.4 mg/dL Final     - continue parrish    Infectious Disease:   - WBC trending down, trend daily  - antibiotics stopped  Lab Results   Component Value Date    WBC 12.74 (H) 09/12/2019       Hematology/Oncology:  - H/H steady, monitor daily  - Heparin to 400U/hr  - ASA 325mg daily  Lab Results   Component Value Date    WBC 12.74 (H) 09/12/2019    HGB 8.5 (L) 09/12/2019    HCT 28.2 (L) 09/12/2019    MCV 95 09/12/2019     (L) 09/12/2019     Recent Labs   Lab 09/12/19  0500   INR 1.2   APTT 29.8     Endocrine:  - insulin SSI as needed    F:   Fluids/Electrolytes (From admission, onward)    Start     Stop Route Frequency Ordered    09/12/19 1306  glucose chewable tablet 16 g      -- Oral As needed (PRN) 09/12/19 1206    09/12/19 1306  glucose chewable tablet 24 g      -- Oral As needed (PRN) 09/12/19 1206    09/12/19 1306  dextrose 10% (D10W) Bolus      -- IV As needed (PRN) 09/12/19 1206    09/12/19 1306  dextrose 10% (D10W) Bolus      -- IV As needed (PRN) 09/12/19 1206    09/12/19 1306  glucagon (human  recombinant) injection 1 mg      -- IM As needed (PRN) 09/12/19 1206    09/10/19 1300  dextrose 5 % and 0.45 % NaCl with KCl 40 mEq infusion      -- IV Continuous 09/10/19 1215    09/10/19 1259  potassium chloride 20 mEq in 100 mL IVPB (FOR CENTRAL LINE ADMINISTRATION ONLY)      -- IV As needed (PRN) 09/10/19 1215    09/10/19 1259  potassium chloride 20 mEq in 100 mL IVPB (FOR CENTRAL LINE ADMINISTRATION ONLY)      -- IV As needed (PRN) 09/10/19 1215    09/10/19 1259  magnesium sulfate 2g in water 50mL IVPB (premix)      -- IV As needed (PRN) 09/10/19 1215    09/10/19 1259  sodium chloride 0.9% flush 10 mL      -- IV As needed (PRN) 09/10/19 1215         E:   Recent Labs   Lab 09/12/19  0500   *   K 4.3      CO2 24   BUN 27*   CREATININE 1.9*   MG 2.4      N: CLD     GI:   - replace electrolytes as needed to keep K>4, Mg>2.5  - bowel reg - daily miralax, docusate  - pantoprazole for GI prophylaxis    Dispo:  - Possible stepdown this afternoon if doing well and tolerated OOBTC

## 2019-09-12 NOTE — PROGRESS NOTES
Follow up note:   to see pt for follow up.    Worker spoke to the pt who appears alert and oriented.  The pt's family is currently not in attendance.  The pt's mother has gone to [employer] to  the pt's cats to bring them to her own home in [place]. The pt's father lives out of state and is staying in the [facility]. Pt reports she is receiving good support from family and friend (caregiver).   The pt reports some concern/frustration with her Effexor being stopped, however pt reports she is back on this medication and feels she will feel back to her norm soon.    Worker provided support and encouragement. Transplant  will continue to follow.

## 2019-09-12 NOTE — PT/OT/SLP PROGRESS
Speech Language Pathology Treatment  And Discharge Summary    Patient Name:  Deborah Navas   MRN:  [id number]  Admitting Diagnosis: Acute on chronic combined systolic and diastolic heart failure    Recommendations:                 General Recommendations:  Follow-up not indicated  Diet recommendations:  Regular, Liquid Diet Level: Thin   Aspiration Precautions: Standard aspiration precautions   General Precautions: Standard, LVAD, sternal  Communication strategies:  none    Subjective     Patient awake;alert.     Pain/Comfort:  · Pain Rating 1: 0/10    Objective:     Has the patient been evaluated by SLP for swallowing?   Yes  Keep patient NPO? No   Current Respiratory Status:        Patient found in bed upon ST entry to room. Patient tolerated thin liquids via straw sips x5 along with regular solids via 1 whole cracker with no overt signs of airway compromise. Patient with good bolus formation/containment, adequate a-p transport, and timely swallow. No apparent oropharyngeal dysphagia evident at this time. ST to discharge patient from acute ST. ST education patient on all standard aspiration precautions and discharge plan of care. Patient verbalized understanding.     Assessment:     Deborah Navas is a 48 y.o. female with an SLP diagnosis of no oropharyngeal dysphagia. .  She presents with with no further acute ST needs at this time.     Goals:   Multidisciplinary Problems     SLP Goals        Problem: SLP Goal    Goal Priority Disciplines Outcome   SLP Goal     SLP    Description:  Speech Language Pathology Goals  Goals expected to be met by 9/18:  1. Patient will tolerate a regular consistency diet and thin liquids with no overt signs of airway compromise.                         Plan:     · Patient to be seen:  3 x/week   · Plan of Care expires:  10/11/19  · Plan of Care reviewed with:  patient   · SLP Follow-Up:  No       Discharge recommendations:  home   Barriers to Discharge:  None    Time  Tracking:     SLP Treatment Date:   09/12/19  Speech Start Time:  1035  Speech Stop Time:  1051     Speech Total Time (min):  16 min    Billable Minutes: Treatment Swallowing Dysfunction 8 and Seld Care/Home Management Training 8    Emily Abadie, CCC-SLP  09/12/2019

## 2019-09-12 NOTE — PROGRESS NOTES
[facility]  Critical Care - Surgery  Progress Note    Patient Name: Deborah Navas  MRN: [id number]  Admission Date: 9/4/2019  Hospital Length of Stay: 8 days  Code Status: Full Code  Attending Provider: Shar Walden MD  Primary Care Provider: Primary Doctor No   Principal Problem: Acute on chronic combined systolic and diastolic heart failure    Subjective:     Hospital/ICU Course:  Patient was taken to the operating room 9/10/19 for insertion of LVAD, insertion of graft pericardium, closure of sternum. She tolerated the procedure well and arrived to the SICU intubated and sedated, on vasopressors in stable condition with LVAD rate of 5300rpm, flow at 4.31.    Interval History/Significant Events: NAEON, VSS. Urine output fluctuating between 30 and 100cc/hr. Lasix drip at 7.5.  Tmax 101.4.  Antibiotics stopped.  HR 91, LVAD in place and functioning, Pump flow 4.3, Speed 5300, and Pulse Index 3. CVP 14. She is breathing comfortably on 4 L NC, reports pain is adequately controlled. 2 CT with right 220cc and left 48cc.      Follow-up For: Procedure(s) (LRB):  INSERTION-LEFT VENTRICULAR ASSIST DEVICE (N/A)  INSERTION, GRAFT, PERICARDIUM  CLOSURE, WOUND, STERNUM    Post-Operative Day: 1 Day Post-Op    Objective:     Vital Signs (Most Recent):  Temp: (!) 101.4 °F (38.6 °C) (09/11/19 0700)  Pulse: (!) 145 (09/11/19 1137)  Resp: (!) 35 (09/11/19 1137)  BP: (!) 82/2 (09/11/19 0745)  SpO2: (!) 94 % (09/11/19 1137) Vital Signs (24h Range):  Temp:  [97 °F (36.1 °C)-101.4 °F (38.6 °C)] 101.4 °F (38.6 °C)  Pulse:  [] 145  Resp:  [10-48] 35  SpO2:  [80 %-100 %] 94 %  BP: ()/(0-78) 82/2  Arterial Line BP: ()/() 80/70     Weight: 114.2 kg (251 lb 12.3 oz)  Body mass index is 39.43 kg/m².      Intake/Output Summary (Last 24 hours) at 9/11/2019 1210  Last data filed at 9/11/2019 1000  Gross per 24 hour   Intake 2206.7 ml   Output 3905 ml   Net -1698.3 ml       Physical Exam    Constitutional: She is oriented to person, place, and time. She appears well-developed and well-nourished.   HENT:   Head: Normocephalic.   Eyes: Pupils are equal, round, and reactive to light. EOM are normal.   Neck: Normal range of motion.   Cardiovascular: Normal rate and regular rhythm.   Midline chest incision   Pulmonary/Chest: Effort normal.   Abdominal: Soft. She exhibits no distension. There is no tenderness.   Musculoskeletal: Normal range of motion.   Neurological: She is alert and oriented to person, place, and time.   Skin: Skin is warm and dry.       Vents:  Vent Mode: A/C (09/10/19 1252)  Set Rate: 14 bmp (09/10/19 1252)  Vt Set: 350 mL (09/10/19 1252)  PEEP/CPAP: 5 cmH20 (09/10/19 1252)  Oxygen Concentration (%): 36 (09/11/19 1137)  Peak Airway Pressure: 29 cmH2O (09/10/19 1252)  Plateau Pressure: 0 cmH20 (09/10/19 1252)  Total Ve: 8.93 mL (09/10/19 1252)  Negative Inspiratory Force (cm H2O): -50 (09/10/19 0400)  F/VT Ratio<105 (RSBI): (!) 77.35 (09/10/19 1252)    Lines/Drains/Airways     Central Venous Catheter Line                 Percutaneous Central Line Insertion/Assessment - Quad lumen  09/10/19 0735 1 day         Percutaneous Central Line Insertion/Assessment - quad lumen  09/10/19 0735 1 day         Pulmonary Artery Catheter Assessment  09/10/19 0735 1 day          Drain                 Chest Tube 09/10/19 1200 1 Right 1 day         Chest Tube 09/10/19 1200 2 1 day         Urethral Catheter 09/10/19 0732 Temperature probe 16 Fr. 1 day          Arterial Line                 Arterial Line 09/10/19 0710 Right Other (Comment) 1 day          Line                 VAD 09/10/19 1322 Left ventricular assist device HeartMate 3 less than 1 day          Peripheral Intravenous Line                 Peripheral IV - Single Lumen 09/09/19 1030 18 G Left Antecubital 2 days         Peripheral IV - Single Lumen 09/09/19 1600 20 G Left Forearm 1 day                Significant Labs:    CBC/Anemia  Profile:  Recent Labs   Lab 09/11/19  0008  09/11/19  0400 09/11/19  0735 09/11/19  0822   WBC 10.73  --  12.46  12.46  --  14.33*   HGB 9.4*  --  9.8*  9.8*  --  10.0*   HCT 31.1*   < > 32.5*  32.5* 33* 32.9*   *  --  144*  144*  --  136*   MCV 97  --  96  96  --  96   RDW 14.9*  --  15.0*  15.0*  --  15.1*    < > = values in this interval not displayed.        Chemistries:  Recent Labs   Lab 09/10/19  0536  09/11/19  0008 09/11/19 0400 09/11/19  0822      < > 135* 135* 140  140   K 3.8   < > 4.6 4.4 4.5  4.5      < > 103 100 104  104   CO2 22*   < > 21* 25 20*  20*   BUN 25*   < > 20 19 22*  22*   CREATININE 1.8*   < > 1.4 1.5* 1.5*  1.5*   CALCIUM 9.7   < > 9.1 9.2 9.0  9.0   ALBUMIN 3.7  --   --  4.1 3.8   PROT 7.0  --   --  6.8 6.5   BILITOT 0.5  --   --  1.8* 1.6*   ALKPHOS 76  --   --  62 57   ALT 23  --   --  29 29   AST 33  --   --  114* 102*   MG 2.1   < > 2.3 2.7* 2.4  2.4   PHOS 3.8   < > 4.9* 5.1* 5.1*  5.1*    < > = values in this interval not displayed.       ABGs:   Recent Labs   Lab 09/11/19  1421   PH 7.329*   PCO2 48.8*   HCO3 25.7   POCSATURATED 94*   BE 0     CMP:   Recent Labs   Lab 09/11/19  0400 09/11/19  0822 09/12/19  0500   * 140  140 135*   K 4.4 4.5  4.5 4.3    104  104 100   CO2 25 20*  20* 24   * 156*  156* 128*   BUN 19 22*  22* 27*   CREATININE 1.5* 1.5*  1.5* 1.9*   CALCIUM 9.2 9.0  9.0 9.6   PROT 6.8 6.5  --    ALBUMIN 4.1 3.8  --    BILITOT 1.8* 1.6*  --    ALKPHOS 62 57  --    * 102*  --    ALT 29 29  --    ANIONGAP 10 16  16 11   EGFRNONAA 40.6* 40.6*  40.6* 30.5*     Cardiac Markers: No results for input(s): CKMB, TROPONINT, MYOGLOBIN in the last 48 hours.  Coagulation:   Recent Labs   Lab 09/12/19  0500   INR 1.2   APTT 29.8     Lactic Acid: No results for input(s): LACTATE in the last 48 hours.  Troponin: No results for input(s): TROPONINI in the last 48 hours.    Significant Imaging:  I have reviewed all  pertinent imaging results/findings within the past 24 hours.    Assessment/Plan:     LVAD (left ventricular assist device) present  Ms. Navas is our 47yo female who is status post LVAD placement on 9/10/2019.    Neuro:   - Alert and oriented  - PCA pump  - Venlafaxine    Cardiac:  - continue allyson  - continue CVC  - CT output 220/24hrs on right and 48cc/24hrs on left, continue to wall suction, will remove if output remains low after OOBTC  - Maintain pump speed at 5300rpm  - Dobutamine to 4mcg/kg/min    Pulmonary:   - Extubated and doing well on 5L NC  - Wean oxygen requirements as tolerated  - Pulmonary toilet    Oxygen Concentration (%):  [32] 32    Renal:   - monitor Is and Os  - BUN/Cr   - Increase lasix drip to 10mg/hour  BUN, Bld   Date Value Ref Range Status   09/12/2019 27 (H) 6 - 20 mg/dL Final   09/11/2019 22 (H) 6 - 20 mg/dL Final   09/11/2019 22 (H) 6 - 20 mg/dL Final     Creatinine   Date Value Ref Range Status   09/12/2019 1.9 (H) 0.5 - 1.4 mg/dL Final   09/11/2019 1.5 (H) 0.5 - 1.4 mg/dL Final   09/11/2019 1.5 (H) 0.5 - 1.4 mg/dL Final     - continue parrish    Infectious Disease:   - WBC trending down, trend daily  - antibiotics stopped  Lab Results   Component Value Date    WBC 12.74 (H) 09/12/2019       Hematology/Oncology:  - H/H steady, monitor daily  - Heparin to 400U/hr  - ASA 325mg daily  Lab Results   Component Value Date    WBC 12.74 (H) 09/12/2019    HGB 8.5 (L) 09/12/2019    HCT 28.2 (L) 09/12/2019    MCV 95 09/12/2019     (L) 09/12/2019     Recent Labs   Lab 09/12/19  0500   INR 1.2   APTT 29.8     Endocrine:  - insulin SSI as needed    F:   Fluids/Electrolytes (From admission, onward)    Start     Stop Route Frequency Ordered    09/12/19 1306  glucose chewable tablet 16 g      -- Oral As needed (PRN) 09/12/19 1206    09/12/19 1306  glucose chewable tablet 24 g      -- Oral As needed (PRN) 09/12/19 1206    09/12/19 1306  dextrose 10% (D10W) Bolus      -- IV As needed (PRN) 09/12/19  1206    09/12/19 1306  dextrose 10% (D10W) Bolus      -- IV As needed (PRN) 09/12/19 1206    09/12/19 1306  glucagon (human recombinant) injection 1 mg      -- IM As needed (PRN) 09/12/19 1206    09/10/19 1300  dextrose 5 % and 0.45 % NaCl with KCl 40 mEq infusion      -- IV Continuous 09/10/19 1215    09/10/19 1259  potassium chloride 20 mEq in 100 mL IVPB (FOR CENTRAL LINE ADMINISTRATION ONLY)      -- IV As needed (PRN) 09/10/19 1215    09/10/19 1259  potassium chloride 20 mEq in 100 mL IVPB (FOR CENTRAL LINE ADMINISTRATION ONLY)      -- IV As needed (PRN) 09/10/19 1215    09/10/19 1259  magnesium sulfate 2g in water 50mL IVPB (premix)      -- IV As needed (PRN) 09/10/19 1215    09/10/19 1259  sodium chloride 0.9% flush 10 mL      -- IV As needed (PRN) 09/10/19 1215         E:   Recent Labs   Lab 09/12/19  0500   *   K 4.3      CO2 24   BUN 27*   CREATININE 1.9*   MG 2.4      N: CLD     GI:   - replace electrolytes as needed to keep K>4, Mg>2.5  - bowel reg - daily miralax, docusate  - pantoprazole for GI prophylaxis    Dispo:  - Possible stepdown this afternoon if doing well and tolerated OOB        Luis Das MD  Critical Care - Surgery  [facility]

## 2019-09-12 NOTE — PLAN OF CARE
Problem: Physical Therapy Goal  Goal: Physical Therapy Goal  Goals to be met by: 10/1/2019    Patient will increase functional independence with mobility by performin. Supine to sit with Contact Guard Assistance - not met  2. Sit to stand transfer with Supervision - not met  3. Gait  x 200 feet with Supervision  - not met  4. Ascend/descend 2 stair with Contact Guard Assistance - not met     Outcome: Ongoing (interventions implemented as appropriate)  Pt is progressing toward goals. All goals remain appropriate.

## 2019-09-12 NOTE — CARE UPDATE
BG goal 140-180. BG at goal without insulin. Diet advanced to SF clears.       Change BG monitoring to ac/hs and low dose correction scale.     ** Please call Endocrine for any BG related issues **

## 2019-09-12 NOTE — ASSESSMENT & PLAN NOTE
NICM EF: 20%, LVEDD: 6.8cm. Repeat echo ordered and pending  RHC: done on admit 9/4/19 RA: 20/ 20/ 18 RV: 60/ 8/ 18 PA: 60/ 32/ 45 PWP: 43/ 70/ 40 . Cardiac output was 2.27 by Fitz. Cardiac index is 1.04 L/min/m2. O2 Sat: PA 34%. AO 95% PVR 2.2 PALMER  - S/p LVAD 9/10  - VAD settings 9/12: speed 5300 / flow 4.3 / power 3.7 / PI 2.9  - Management per CTS  - CVP 14 this morning  - Epi 0.01 and  4

## 2019-09-13 LAB
ALBUMIN SERPL BCP-MCNC: 3.6 G/DL (ref 3.5–5.2)
ALP SERPL-CCNC: 66 U/L (ref 55–135)
ALT SERPL W/O P-5'-P-CCNC: 171 U/L (ref 10–44)
ANION GAP SERPL CALC-SCNC: 13 MMOL/L (ref 8–16)
APTT BLDCRRT: 30.1 SEC (ref 21–32)
APTT BLDCRRT: 30.1 SEC (ref 21–32)
APTT BLDCRRT: 33.5 SEC (ref 21–32)
AST SERPL-CCNC: 172 U/L (ref 10–40)
BASOPHILS # BLD AUTO: 0.03 K/UL (ref 0–0.2)
BASOPHILS NFR BLD: 0.3 % (ref 0–1.9)
BILIRUB DIRECT SERPL-MCNC: 1.4 MG/DL (ref 0.1–0.3)
BILIRUB SERPL-MCNC: 1.8 MG/DL (ref 0.1–1)
BLD PROD TYP BPU: NORMAL
BLOOD UNIT EXPIRATION DATE: NORMAL
BLOOD UNIT TYPE CODE: 5100
BLOOD UNIT TYPE: NORMAL
BNP SERPL-MCNC: 987 PG/ML (ref 0–99)
BUN SERPL-MCNC: 31 MG/DL (ref 6–20)
CALCIUM SERPL-MCNC: 9.6 MG/DL (ref 8.7–10.5)
CHLORIDE SERPL-SCNC: 98 MMOL/L (ref 95–110)
CO2 SERPL-SCNC: 25 MMOL/L (ref 23–29)
CODING SYSTEM: NORMAL
CREAT SERPL-MCNC: 1.6 MG/DL (ref 0.5–1.4)
CRP SERPL-MCNC: 338.5 MG/L (ref 0–8.2)
DIFFERENTIAL METHOD: ABNORMAL
DISPENSE STATUS: NORMAL
EOSINOPHIL # BLD AUTO: 0.1 K/UL (ref 0–0.5)
EOSINOPHIL NFR BLD: 1.3 % (ref 0–8)
ERYTHROCYTE [DISTWIDTH] IN BLOOD BY AUTOMATED COUNT: 14.7 % (ref 11.5–14.5)
EST. GFR  (AFRICAN AMERICAN): 43.3 ML/MIN/1.73 M^2
EST. GFR  (NON AFRICAN AMERICAN): 37.6 ML/MIN/1.73 M^2
GLUCOSE SERPL-MCNC: 98 MG/DL (ref 70–110)
HCT VFR BLD AUTO: 27.1 % (ref 37–48.5)
HGB BLD-MCNC: 8.3 G/DL (ref 12–16)
IMM GRANULOCYTES # BLD AUTO: 0.07 K/UL (ref 0–0.04)
IMM GRANULOCYTES NFR BLD AUTO: 0.7 % (ref 0–0.5)
INR PPP: 1.2 (ref 0.8–1.2)
LDH SERPL L TO P-CCNC: 400 U/L (ref 110–260)
LYMPHOCYTES # BLD AUTO: 0.8 K/UL (ref 1–4.8)
LYMPHOCYTES NFR BLD: 7.6 % (ref 18–48)
MAGNESIUM SERPL-MCNC: 2.3 MG/DL (ref 1.6–2.6)
MCH RBC QN AUTO: 29.1 PG (ref 27–31)
MCHC RBC AUTO-ENTMCNC: 30.6 G/DL (ref 32–36)
MCV RBC AUTO: 95 FL (ref 82–98)
MONOCYTES # BLD AUTO: 1 K/UL (ref 0.3–1)
MONOCYTES NFR BLD: 9.7 % (ref 4–15)
NEUTROPHILS # BLD AUTO: 8.4 K/UL (ref 1.8–7.7)
NEUTROPHILS NFR BLD: 80.4 % (ref 38–73)
NRBC BLD-RTO: 0 /100 WBC
NUM UNITS TRANS PACKED RBC: NORMAL
PHOSPHATE SERPL-MCNC: 3.7 MG/DL (ref 2.7–4.5)
PLATELET # BLD AUTO: 130 K/UL (ref 150–350)
PMV BLD AUTO: 10.3 FL (ref 9.2–12.9)
POCT GLUCOSE: 101 MG/DL (ref 70–110)
POCT GLUCOSE: 112 MG/DL (ref 70–110)
POTASSIUM SERPL-SCNC: 3.5 MMOL/L (ref 3.5–5.1)
PROT SERPL-MCNC: 6.3 G/DL (ref 6–8.4)
PROTHROMBIN TIME: 12.1 SEC (ref 9–12.5)
RBC # BLD AUTO: 2.85 M/UL (ref 4–5.4)
SODIUM SERPL-SCNC: 136 MMOL/L (ref 136–145)
VANCOMYCIN SERPL-MCNC: 10.5 UG/ML
WBC # BLD AUTO: 10.45 K/UL (ref 3.9–12.7)

## 2019-09-13 PROCEDURE — 86140 C-REACTIVE PROTEIN: CPT | Mod: NTX

## 2019-09-13 PROCEDURE — 25000003 PHARM REV CODE 250: Mod: NTX | Performed by: STUDENT IN AN ORGANIZED HEALTH CARE EDUCATION/TRAINING PROGRAM

## 2019-09-13 PROCEDURE — 94761 N-INVAS EAR/PLS OXIMETRY MLT: CPT | Mod: NTX

## 2019-09-13 PROCEDURE — 97530 THERAPEUTIC ACTIVITIES: CPT | Mod: NTX

## 2019-09-13 PROCEDURE — 27000248 HC VAD-ADDITIONAL DAY: Mod: NTX

## 2019-09-13 PROCEDURE — 93750 INTERROGATION VAD IN PERSON: CPT | Mod: NTX,,, | Performed by: INTERNAL MEDICINE

## 2019-09-13 PROCEDURE — 27000221 HC OXYGEN, UP TO 24 HOURS: Mod: NTX

## 2019-09-13 PROCEDURE — 85730 THROMBOPLASTIN TIME PARTIAL: CPT | Mod: 91,NTX

## 2019-09-13 PROCEDURE — 85025 COMPLETE CBC W/AUTO DIFF WBC: CPT | Mod: NTX

## 2019-09-13 PROCEDURE — 93750 PR INTERROGATE VENT ASSIST DEV, IN PERSON, W PHYSICIAN ANALYSIS: ICD-10-PCS | Mod: NTX,,, | Performed by: INTERNAL MEDICINE

## 2019-09-13 PROCEDURE — 83735 ASSAY OF MAGNESIUM: CPT | Mod: NTX

## 2019-09-13 PROCEDURE — 83615 LACTATE (LD) (LDH) ENZYME: CPT | Mod: NTX

## 2019-09-13 PROCEDURE — 85730 THROMBOPLASTIN TIME PARTIAL: CPT | Mod: NTX

## 2019-09-13 PROCEDURE — 99232 PR SUBSEQUENT HOSPITAL CARE,LEVL II: ICD-10-PCS | Mod: NTX,,, | Performed by: INTERNAL MEDICINE

## 2019-09-13 PROCEDURE — 99232 PR SUBSEQUENT HOSPITAL CARE,LEVL II: ICD-10-PCS | Mod: NTX,,, | Performed by: NURSE PRACTITIONER

## 2019-09-13 PROCEDURE — 84100 ASSAY OF PHOSPHORUS: CPT | Mod: NTX

## 2019-09-13 PROCEDURE — 25000003 PHARM REV CODE 250: Mod: NTX | Performed by: THORACIC SURGERY (CARDIOTHORACIC VASCULAR SURGERY)

## 2019-09-13 PROCEDURE — 83880 ASSAY OF NATRIURETIC PEPTIDE: CPT | Mod: NTX

## 2019-09-13 PROCEDURE — 63600175 PHARM REV CODE 636 W HCPCS: Mod: NTX | Performed by: THORACIC SURGERY (CARDIOTHORACIC VASCULAR SURGERY)

## 2019-09-13 PROCEDURE — 99232 SBSQ HOSP IP/OBS MODERATE 35: CPT | Mod: NTX,,, | Performed by: NURSE PRACTITIONER

## 2019-09-13 PROCEDURE — 97535 SELF CARE MNGMENT TRAINING: CPT | Mod: NTX

## 2019-09-13 PROCEDURE — 80076 HEPATIC FUNCTION PANEL: CPT | Mod: NTX

## 2019-09-13 PROCEDURE — 25000003 PHARM REV CODE 250: Mod: NTX | Performed by: INTERNAL MEDICINE

## 2019-09-13 PROCEDURE — 80202 ASSAY OF VANCOMYCIN: CPT | Mod: NTX

## 2019-09-13 PROCEDURE — 99232 SBSQ HOSP IP/OBS MODERATE 35: CPT | Mod: NTX,,, | Performed by: INTERNAL MEDICINE

## 2019-09-13 PROCEDURE — 20600001 HC STEP DOWN PRIVATE ROOM: Mod: NTX

## 2019-09-13 PROCEDURE — 97803 MED NUTRITION INDIV SUBSEQ: CPT | Mod: NTX

## 2019-09-13 PROCEDURE — 97116 GAIT TRAINING THERAPY: CPT | Mod: NTX

## 2019-09-13 PROCEDURE — 63600175 PHARM REV CODE 636 W HCPCS: Mod: NTX | Performed by: INTERNAL MEDICINE

## 2019-09-13 PROCEDURE — 85610 PROTHROMBIN TIME: CPT | Mod: NTX

## 2019-09-13 PROCEDURE — 36415 COLL VENOUS BLD VENIPUNCTURE: CPT | Mod: NTX

## 2019-09-13 PROCEDURE — 80048 BASIC METABOLIC PNL TOTAL CA: CPT | Mod: NTX

## 2019-09-13 RX ORDER — AMIODARONE HYDROCHLORIDE 200 MG/1
200 TABLET ORAL 2 TIMES DAILY
Status: DISCONTINUED | OUTPATIENT
Start: 2019-09-13 | End: 2019-09-15

## 2019-09-13 RX ORDER — HYDROMORPHONE HYDROCHLORIDE 1 MG/ML
1 INJECTION, SOLUTION INTRAMUSCULAR; INTRAVENOUS; SUBCUTANEOUS
Status: DISCONTINUED | OUTPATIENT
Start: 2019-09-13 | End: 2019-09-13

## 2019-09-13 RX ORDER — RAMELTEON 8 MG/1
8 TABLET ORAL NIGHTLY
Status: DISCONTINUED | OUTPATIENT
Start: 2019-09-13 | End: 2019-10-01 | Stop reason: HOSPADM

## 2019-09-13 RX ORDER — DOBUTAMINE HYDROCHLORIDE 400 MG/100ML
2.5 INJECTION, SOLUTION INTRAVENOUS CONTINUOUS
Status: DISCONTINUED | OUTPATIENT
Start: 2019-09-13 | End: 2019-09-18

## 2019-09-13 RX ORDER — AMOXICILLIN 250 MG
2 CAPSULE ORAL DAILY
Status: DISCONTINUED | OUTPATIENT
Start: 2019-09-13 | End: 2019-09-17

## 2019-09-13 RX ORDER — WARFARIN SODIUM 5 MG/1
5 TABLET ORAL DAILY
Status: DISCONTINUED | OUTPATIENT
Start: 2019-09-14 | End: 2019-09-16

## 2019-09-13 RX ORDER — OXYCODONE HCL 20 MG/1
20 TABLET, FILM COATED, EXTENDED RELEASE ORAL EVERY 12 HOURS
Status: DISCONTINUED | OUTPATIENT
Start: 2019-09-13 | End: 2019-09-18

## 2019-09-13 RX ORDER — HYDROMORPHONE HYDROCHLORIDE 1 MG/ML
1 INJECTION, SOLUTION INTRAMUSCULAR; INTRAVENOUS; SUBCUTANEOUS
Status: DISCONTINUED | OUTPATIENT
Start: 2019-09-13 | End: 2019-09-17

## 2019-09-13 RX ORDER — HYDROCORTISONE 1 %
CREAM (GRAM) TOPICAL
Status: DISCONTINUED | OUTPATIENT
Start: 2019-09-13 | End: 2019-10-01 | Stop reason: HOSPADM

## 2019-09-13 RX ORDER — POTASSIUM CHLORIDE 20 MEQ/1
40 TABLET, EXTENDED RELEASE ORAL ONCE
Status: COMPLETED | OUTPATIENT
Start: 2019-09-13 | End: 2019-09-13

## 2019-09-13 RX ORDER — WARFARIN 4 MG/1
4 TABLET ORAL ONCE
Status: DISCONTINUED | OUTPATIENT
Start: 2019-09-13 | End: 2019-09-13

## 2019-09-13 RX ORDER — WARFARIN 4 MG/1
4 TABLET ORAL ONCE
Status: COMPLETED | OUTPATIENT
Start: 2019-09-13 | End: 2019-09-13

## 2019-09-13 RX ADMIN — DOBUTAMINE IN DEXTROSE 2.5 MCG/KG/MIN: 200 INJECTION, SOLUTION INTRAVENOUS at 12:09

## 2019-09-13 RX ADMIN — HYDROXYZINE HYDROCHLORIDE 25 MG: 25 TABLET, FILM COATED ORAL at 06:09

## 2019-09-13 RX ADMIN — POTASSIUM CHLORIDE 40 MEQ: 20 TABLET, EXTENDED RELEASE ORAL at 06:09

## 2019-09-13 RX ADMIN — HYDROCORTISONE: 10 CREAM TOPICAL at 08:09

## 2019-09-13 RX ADMIN — WARFARIN SODIUM 4 MG: 4 TABLET ORAL at 05:09

## 2019-09-13 RX ADMIN — RAMELTEON 8 MG: 8 TABLET ORAL at 08:09

## 2019-09-13 RX ADMIN — OXYCODONE HYDROCHLORIDE 10 MG: 10 TABLET ORAL at 04:09

## 2019-09-13 RX ADMIN — MUPIROCIN: 20 OINTMENT TOPICAL at 09:09

## 2019-09-13 RX ADMIN — DOBUTAMINE IN DEXTROSE 4 MCG/KG/MIN: 200 INJECTION, SOLUTION INTRAVENOUS at 03:09

## 2019-09-13 RX ADMIN — PANTOPRAZOLE SODIUM 40 MG: 40 TABLET, DELAYED RELEASE ORAL at 09:09

## 2019-09-13 RX ADMIN — AMIODARONE HYDROCHLORIDE 200 MG: 200 TABLET ORAL at 08:09

## 2019-09-13 RX ADMIN — ASPIRIN 325 MG: 325 TABLET, COATED ORAL at 08:09

## 2019-09-13 RX ADMIN — VENLAFAXINE 150 MG: 37.5 TABLET ORAL at 08:09

## 2019-09-13 RX ADMIN — ONDANSETRON 4 MG: 2 INJECTION INTRAMUSCULAR; INTRAVENOUS at 05:09

## 2019-09-13 RX ADMIN — SENNOSIDES,DOCUSATE SODIUM 2 TABLET: 8.6; 5 TABLET, FILM COATED ORAL at 02:09

## 2019-09-13 RX ADMIN — OXYCODONE HYDROCHLORIDE 20 MG: 20 TABLET, FILM COATED, EXTENDED RELEASE ORAL at 08:09

## 2019-09-13 RX ADMIN — FUROSEMIDE 10 MG/HR: 10 INJECTION, SOLUTION INTRAMUSCULAR; INTRAVENOUS at 05:09

## 2019-09-13 RX ADMIN — POLYETHYLENE GLYCOL 3350 17 G: 17 POWDER, FOR SOLUTION ORAL at 09:09

## 2019-09-13 RX ADMIN — ONDANSETRON 4 MG: 2 INJECTION INTRAMUSCULAR; INTRAVENOUS at 06:09

## 2019-09-13 RX ADMIN — MUPIROCIN: 20 OINTMENT TOPICAL at 08:09

## 2019-09-13 NOTE — ASSESSMENT & PLAN NOTE
NICM EF 20% s/p LVAD 9/10  RHC: done on admit 9/4/19 RA: 20/ 20/ 18 RV: 60/ 8/ 18 PA: 60/ 32/ 45 PWP: 43/ 70/ 40 . Cardiac output was 2.27 by Fitz. Cardiac index is 1.04 L/min/m2. O2 Sat: PA 34%. AO 95% PVR 2.2 PALMER  VAD settings 9/13: speed 5300 / flow 4.3 / power 3.8 / PI 3.3    - Uneventful post-operative course. Weaned off of epi. Decreasing  to 2.5 from 4 today, and transferring to CTSU  - Pain control with oxycodone 20 mg Q12 hours with dilaudid 1 mg IV Q2 hours PRN  - Heparin gtt until therapeutic INR  - Lasix 10 mg/hr gtt. Consider changing to intermittent tomorrow.  - GDMT in near future

## 2019-09-13 NOTE — NURSING
RIJ central line sites red and swollen to the touch. Leaking noted at insertion site. Notified Dr. Walden. Midline team placed peripheral access. Will d/c central lines.

## 2019-09-13 NOTE — PROGRESS NOTES
Seen pt in hospital. Inspected and cleaned LVAD equipment in room. Introduced myself to pt and family, Will follow up on Monday

## 2019-09-13 NOTE — PT/OT/SLP PROGRESS
Occupational Therapy   Treatment    Name: Deborah Navas  MRN: [id number]  Admitting Diagnosis:  Acute on chronic combined systolic and diastolic heart failure  3 Days Post-Op    Recommendations:     Discharge Recommendations: home  Discharge Equipment Recommendations:  none  Barriers to discharge:  None    Assessment:     Deborah Navas is a 48 y.o. female with a medical diagnosis of Acute on chronic combined systolic and diastolic heart failure.  She was able to perform supine/sit T/F c min A and sit/stand and bed/chair T/F c CGA.  Able to perform LVAD management c min A.  Performed self-test this AM and stated to OT how to perform self-test correctly.  Able to perform UB dressing c total assist 2* IV lines and LB dressing c total assist.  Pt was able to maintain sternal precautions throughout tx session.  Performance deficits affecting function are weakness, impaired endurance, impaired self care skills, impaired functional mobilty, decreased ROM.     Rehab Prognosis:  Good; patient would benefit from acute skilled OT services to address these deficits and reach maximum level of function.       Plan:     Patient to be seen 6 x/week to address the above listed problems via self-care/home management, therapeutic activities, therapeutic exercises  · Plan of Care Expires: 10/11/19  · Plan of Care Reviewed with: patient, family    Subjective     Pain/Comfort:  · Pain Rating 1: 0/10    Objective:     Communicated with: RN prior to session.  Patient found supine with arterial line, blood pressure cuff, central line, chest tube, parrish catheter, LVAD, peripheral IV, pulse ox (continuous), telemetry upon OT entry to room.    General Precautions: Standard, fall, LVAD, sternal   Orthopedic Precautions:N/A   Braces: N/A     Occupational Performance:     Bed Mobility:    · Patient completed Supine to Sit with minimum assistance     Functional Mobility/Transfers:  · Patient completed Sit <> Stand Transfer with  contact guard assistance  with  no assistive device   · Patient completed Bed <> Chair Transfer using Stand Pivot technique with contact guard assistance with no assistive device      Activities of Daily Living:  · Upper Body Dressing: minimum assistance to switch over from power module to battery and max A to don consolidation bag.  Able to don hospital gown c total assist 2* IV lines.  · Lower Body Dressing: total assistance to don socks.      [facility] 6 Click ADL: 14        Patient left up in chair with all lines intact, call button in reach, RN notified and family presentEducation:      GOALS:   Multidisciplinary Problems     Occupational Therapy Goals        Problem: Occupational Therapy Goal    Goal Priority Disciplines Outcome Interventions   Occupational Therapy Goal     OT, PT/OT     Description:  Goals to be met by: 10/11/19     Patient will increase functional independence with ADLs by performing:    UE Dressing with [place].  LE Dressing with [place].  Grooming while standing at sink with [place].  Toileting from toilet with Dillon for hygiene and clothing management.   Bathing from  shower chair/bench with Dillon.  Toilet transfer to toilet with Dillon.  Increased functional strength to WFL for B UE.  Upper extremity exercise program x15 reps per handout, with independence.                      Time Tracking:     OT Date of Treatment: 09/13/19  OT Start Time: 0920  OT Stop Time: 1000  OT Total Time (min): 40 min    Billable Minutes:Self Care/Home Management 30  Therapeutic Activity 10    JAGJIT Cordero  9/13/2019

## 2019-09-13 NOTE — PT/OT/SLP PROGRESS
Physical Therapy Treatment    Patient Name:  Deborah Navas   MRN:  [id number]    Recommendations:     Discharge Recommendations:  home   Discharge Equipment Recommendations: none   Barriers to discharge: None    Assessment:     Deborah Navas is a 48 y.o. female admitted with a medical diagnosis of Acute on chronic combined systolic and diastolic heart failure.  She presents with the following impairments/functional limitations:  weakness, impaired endurance, impaired functional mobilty, gait instability, impaired cardiopulmonary response to activity, pain, decreased upper extremity function. Pt progressing towards goals, but not at PLOF. Pt tolerated session well but has increased anxiety from lines during functional mobility. Pt is improving with therapy evidenced by increased gait distance.  Recommend d/c to Home to maximize functional independence.      Rehab Prognosis: Good; patient would benefit from acute skilled PT services to address these deficits and reach maximum level of function.    Recent Surgery: Procedure(s) (LRB):  INSERTION-LEFT VENTRICULAR ASSIST DEVICE (N/A)  INSERTION, GRAFT, PERICARDIUM  CLOSURE, WOUND, STERNUM 3 Days Post-Op    Plan:     During this hospitalization, patient to be seen 6 x/week to address the identified rehab impairments via gait training, therapeutic activities, therapeutic exercises, neuromuscular re-education and progress toward the following goals:    · Plan of Care Expires:  10/10/19    Subjective     Chief Complaint: anxiety with functional mobility; pain  Patient/Family Comments/goals: to get better and return home   Pain/Comfort:  · Pain Rating 1: 0/10  · Pain Addressed 1: Reposition, Distraction, Nurse notified  · Pain Rating Post-Intervention 1: (increased sternal pain after ambulation; unrated)      Objective:     Communicated with RN prior to session.  Patient found up in chair with telemetry, pulse ox (continuous), blood pressure cuff, LVAD,  oxygen, peripheral IV, parrish catheter, chest tube, arterial line(PCA pump) upon PT entry to room.     General Precautions: Standard, fall, LVAD, sternal   Orthopedic Precautions:N/A   Braces: N/A     Functional Mobility:  · Bed Mobility: NT 2nd to pt found in chair   · Transfers:     · Sit to Stand:  minimum assistance with no AD x 2 trials from chair  · Increased time to obtain upright posture   · Gait: 76ft x 2 trials with CGA using no AD with a seated rest break in between trials  · Portable monitor, O2, and emergency bag intact and RN present  · Chair follow present   · Pt with increased anxiety re: fear of falling from lines  · Pt demo'd increased knee flexion with fatigue         AM-PAC 6 CLICK MOBILITY  Turning over in bed (including adjusting bedclothes, sheets and blankets)?: 2  Sitting down on and standing up from a chair with arms (e.g., wheelchair, bedside commode, etc.): 3  Moving from lying on back to sitting on the side of the bed?: 2  Moving to and from a bed to a chair (including a wheelchair)?: 3  Need to walk in hospital room?: 3  Climbing 3-5 steps with a railing?: 2  Basic Mobility Total Score: 15       Therapeutic Activities and Exercises:  Educated pt on PT role/POC  Educated pt on importance of OOB activity and daily ambulation  Educated pt on sternal precautions  Pt verbalized understanding    LVAD switched from battery to wall by PT  No alarms sounded     Patient left up in chair with all lines intact, call button in reach and RN notified..    GOALS:   Multidisciplinary Problems     Physical Therapy Goals        Problem: Physical Therapy Goal    Goal Priority Disciplines Outcome Goal Variances Interventions   Physical Therapy Goal     PT, PT/OT Ongoing (interventions implemented as appropriate)     Description:  Goals to be met by: 10/1/2019    Patient will increase functional independence with mobility by performin. Supine to sit with Contact Guard Assistance - not met  2. Sit to  stand transfer with Supervision - not met  3. Gait  x 200 feet with Supervision  - not met  4. Ascend/descend 2 stair with Contact Guard Assistance - not met                      Time Tracking:     PT Received On: 09/13/19  PT Start Time: 0957     PT Stop Time: 1025  PT Total Time (min): 28 min     Billable Minutes: Gait Training 23    Treatment Type: Treatment  PT/PTA: PT             Niurka Posadas PT, DPT  9/13/2019  [phone]

## 2019-09-13 NOTE — ASSESSMENT & PLAN NOTE
Procedure: Device Interrogation Including analysis of device parameters  Current Settings: Ventricular Assist Device  Review of device function is stable/unstable stable    TXP LVAD INTERROGATIONS 9/13/2019 9/13/2019 9/13/2019 9/13/2019 9/13/2019 9/13/2019 9/13/2019   Type - - HeartMate3 - - - HeartMate3   Flow 4.2 4.4 4.2 4.3 4.2 4.3 4.3   Speed 5300 5300 5300 5300 5300 5300 5300   PI 3.2 2.8 3.3 3.2 3.3 3.3 3.2   Power (Orellana) 3.7 3.7 3.7 3.7 3.8 3.8 3.7   LSL 4900 4900 4900 4900 4900 4900 4900   Pulsatility - - Pulse - - - Pulse

## 2019-09-13 NOTE — PLAN OF CARE
Problem: Occupational Therapy Goal  Goal: Occupational Therapy Goal  Goals to be met by: 10/11/19     Patient will increase functional independence with ADLs by performing:    UE Dressing with [place].  LE Dressing with [place].  Grooming while standing at sink with [place].  Toileting from toilet with [place] for hygiene and clothing management.   Bathing from  shower chair/bench with [place].  Toilet transfer to toilet with [place].  Increased functional strength to WFL for B UE.  Upper extremity exercise program x15 reps per handout, with independence.     Cont. POC.

## 2019-09-13 NOTE — SUBJECTIVE & OBJECTIVE
Interval History: still having fair amount of pain especially with repositioning. PI events overnight, but no low flows. Weaning down pressors. Off of epi.     Continuous Infusions:   DOBUTamine 2.5 mcg/kg/min (09/13/19 1300)    furosemide (LASIX) 2 mg/mL infusion (non-titrating) 10 mg/hr (09/13/19 1300)    heparin (porcine) in 5 % dex 400 Units/hr (09/13/19 1300)     Scheduled Meds:   aspirin  325 mg Oral Daily    docusate sodium  200 mg Oral QHS    mupirocin   Nasal BID    oxyCODONE  20 mg Oral Q12H    pantoprazole  40 mg Oral Daily    polyethylene glycol  17 g Oral Daily    ramelteon  8 mg Oral QHS    venlafaxine  150 mg Oral Daily    warfarin  4 mg Oral Once     PRN Meds:albumin human 5%, albuterol sulfate, bisacodyl, Dextrose 10% Bolus, Dextrose 10% Bolus, fentaNYL, glucagon (human recombinant), glucose, glucose, HYDROmorphone, hydrOXYzine HCl, insulin aspart U-100, magnesium hydroxide 400 mg/5 ml, magnesium sulfate IVPB, ondansetron, sodium chloride 0.9%    Review of patient's allergies indicates:   Allergen Reactions    Adhesive Blisters     Reaction to area in chest and up only    Codeine Itching     Objective:     Vital Signs (Most Recent):  Temp: 99.5 °F (37.5 °C) (09/13/19 1100)  Pulse: 87 (09/13/19 1315)  Resp: 20 (09/13/19 1315)  BP: (!) 80/0 (09/13/19 1100)  SpO2: 99 % (09/13/19 1100) Vital Signs (24h Range):  Temp:  [98.2 °F (36.8 °C)-100.6 °F (38.1 °C)] 99.5 °F (37.5 °C)  Pulse:  [] 87  Resp:  [11-49] 20  SpO2:  [96 %-99 %] 99 %  BP: (80-94)/(0) 80/0  Arterial Line BP: (71-96)/(51-87) 81/68     Patient Vitals for the past 72 hrs (Last 3 readings):   Weight   09/13/19 0400 115 kg (253 lb 8.5 oz)   09/12/19 0400 113.1 kg (249 lb 5.4 oz)   09/11/19 0518 114.2 kg (251 lb 12.3 oz)     Body mass index is 39.71 kg/m².      Intake/Output Summary (Last 24 hours) at 9/13/2019 1352  Last data filed at 9/13/2019 1300  Gross per 24 hour   Intake 829.8 ml   Output 2678 ml   Net -1848.2 ml      Physical Exam   Constitutional: She appears well-developed and well-nourished. No distress.   Eyes: Conjunctivae are normal. Right eye exhibits no discharge. Left eye exhibits no discharge.   Neck: No JVD present.   Cardiovascular: Normal rate, regular rhythm and normal heart sounds.   Smooth LVAD hum   Pulmonary/Chest: Effort normal and breath sounds normal. No stridor. No respiratory distress. She has no wheezes.   Abdominal: Soft.   Musculoskeletal: Normal range of motion. She exhibits no edema or deformity.   Neurological: She is alert.   Skin: Skin is warm and dry. Capillary refill takes less than 2 seconds. She is not diaphoretic. No erythema.   Nursing note and vitals reviewed.      Significant Labs:  CBC:  Recent Labs   Lab 09/11/19  0822 09/12/19  0500 09/13/19  0400   WBC 14.33* 12.74* 10.45   RBC 3.43* 2.97* 2.85*   HGB 10.0* 8.5* 8.3*   HCT 32.9* 28.2* 27.1*   * 114* 130*   MCV 96 95 95   MCH 29.2 28.6 29.1   MCHC 30.4* 30.1* 30.6*     BNP:  Recent Labs   Lab 09/06/19  1834 09/11/19  0400 09/13/19  0400   * 976* 987*     CMP:  Recent Labs   Lab 09/11/19  0400 09/11/19  0822 09/12/19  0500 09/12/19  1338 09/13/19  0400   * 156*  156* 128* 99 98   CALCIUM 9.2 9.0  9.0 9.6 9.3 9.6   ALBUMIN 4.1 3.8  --   --  3.6   PROT 6.8 6.5  --   --  6.3   * 140  140 135* 135* 136   K 4.4 4.5  4.5 4.3 4.1 3.5   CO2 25 20*  20* 24 23 25    104  104 100 101 98   BUN 19 22*  22* 27* 28* 31*   CREATININE 1.5* 1.5*  1.5* 1.9* 1.9* 1.6*   ALKPHOS 62 57  --   --  66   ALT 29 29  --   --  171*   * 102*  --   --  172*   BILITOT 1.8* 1.6*  --   --  1.8*      Coagulation:   Recent Labs   Lab 09/11/19  0822 09/12/19  0500 09/12/19  1810 09/13/19  0400 09/13/19  1229   INR 1.1  --  1.2  --  1.2  --    APTT 24.6   < > 29.8 29.0 33.5* 30.1    < > = values in this interval not displayed.     LDH:  Recent Labs   Lab 09/11/19  0822 09/12/19  0500 09/13/19  0400   * 700* 400*      Microbiology:  Microbiology Results (last 7 days)     Procedure Component Value Units Date/Time    Blood culture [[id number]] Collected:  09/11/19 0810    Order Status:  Completed Specimen:  Blood from Peripheral, Antecubital, Left Updated:  09/13/19 1012     Blood Culture, Routine No Growth to date      No Growth to date      No Growth to date    Blood culture [[id number]] Collected:  09/11/19 0832    Order Status:  Completed Specimen:  Blood from Line, Jugular, Internal Right Updated:  09/13/19 1012     Blood Culture, Routine No Growth to date      No Growth to date      No Growth to date    HPV High Risk Genotypes, PCR [[id number]] Collected:  09/11/19 1330    Order Status:  No result Updated:  09/12/19 1006    HPV High Risk Genotypes, PCR [[id number]]     Order Status:  Completed     Blood culture [[id number]] Collected:  09/11/19 0820    Order Status:  Sent Specimen:  Blood from Line, Jugular, Internal Left Updated:  09/11/19 0822          I have reviewed all pertinent labs within the past 24 hours.    Estimated Creatinine Clearance: 55.7 mL/min (A) (based on SCr of 1.6 mg/dL (H)).    Diagnostic Results:  I have reviewed and interpreted all pertinent imaging results/findings within the past 24 hours.

## 2019-09-13 NOTE — ASSESSMENT & PLAN NOTE
Titrate insulin slowly to avoid hypoglycemia as the risk of hypoglycemia increases with decreased creatinine clearance.    Estimated Creatinine Clearance: 55.7 mL/min (A) (based on SCr of 1.6 mg/dL (H)).

## 2019-09-13 NOTE — PROGRESS NOTES
09/13/2019  Christo Cooper    Current provider:  Jolene Lua MD      I, Christo Cooper, rounded on Deborah Navas to ensure all mechanical assist device settings (IABP or VAD) were appropriate and all parameters were within limits.  I was able to ensure all back up equipment was present, the staff had no issues, and the Perfusion Department daily rounding was complete.    1:21 PM

## 2019-09-13 NOTE — PROGRESS NOTES
[facility]  Heart Transplant  Progress Note    Patient Name: Deborah Navas  MRN: [id number]  Admission Date: 9/4/2019  Hospital Length of Stay: 9 days  Attending Physician: Jolene Lua MD  Primary Care Provider: Primary Doctor No  Principal Problem:Acute on chronic combined systolic and diastolic heart failure    Subjective:     Interval History: still having fair amount of pain especially with repositioning. PI events overnight, but no low flows. Weaning down pressors. Off of epi.     Continuous Infusions:   DOBUTamine 2.5 mcg/kg/min (09/13/19 1300)    furosemide (LASIX) 2 mg/mL infusion (non-titrating) 10 mg/hr (09/13/19 1300)    heparin (porcine) in 5 % dex 400 Units/hr (09/13/19 1300)     Scheduled Meds:   aspirin  325 mg Oral Daily    docusate sodium  200 mg Oral QHS    mupirocin   Nasal BID    oxyCODONE  20 mg Oral Q12H    pantoprazole  40 mg Oral Daily    polyethylene glycol  17 g Oral Daily    ramelteon  8 mg Oral QHS    venlafaxine  150 mg Oral Daily    warfarin  4 mg Oral Once     PRN Meds:albumin human 5%, albuterol sulfate, bisacodyl, Dextrose 10% Bolus, Dextrose 10% Bolus, fentaNYL, glucagon (human recombinant), glucose, glucose, HYDROmorphone, hydrOXYzine HCl, insulin aspart U-100, magnesium hydroxide 400 mg/5 ml, magnesium sulfate IVPB, ondansetron, sodium chloride 0.9%    Review of patient's allergies indicates:   Allergen Reactions    Adhesive Blisters     Reaction to area in chest and up only    Codeine Itching     Objective:     Vital Signs (Most Recent):  Temp: 99.5 °F (37.5 °C) (09/13/19 1100)  Pulse: 87 (09/13/19 1315)  Resp: 20 (09/13/19 1315)  BP: (!) 80/0 (09/13/19 1100)  SpO2: 99 % (09/13/19 1100) Vital Signs (24h Range):  Temp:  [98.2 °F (36.8 °C)-100.6 °F (38.1 °C)] 99.5 °F (37.5 °C)  Pulse:  [] 87  Resp:  [11-49] 20  SpO2:  [96 %-99 %] 99 %  BP: (80-94)/(0) 80/0  Arterial Line BP: (71-96)/(51-87) 81/68     Patient Vitals for the past  72 hrs (Last 3 readings):   Weight   09/13/19 0400 115 kg (253 lb 8.5 oz)   09/12/19 0400 113.1 kg (249 lb 5.4 oz)   09/11/19 0518 114.2 kg (251 lb 12.3 oz)     Body mass index is 39.71 kg/m².      Intake/Output Summary (Last 24 hours) at 9/13/2019 1352  Last data filed at 9/13/2019 1300  Gross per 24 hour   Intake 829.8 ml   Output 2678 ml   Net -1848.2 ml     Physical Exam   Constitutional: She appears well-developed and well-nourished. No distress.   Eyes: Conjunctivae are normal. Right eye exhibits no discharge. Left eye exhibits no discharge.   Neck: No JVD present.   Cardiovascular: Normal rate, regular rhythm and normal heart sounds.   Smooth LVAD hum   Pulmonary/Chest: Effort normal and breath sounds normal. No stridor. No respiratory distress. She has no wheezes.   Abdominal: Soft.   Musculoskeletal: Normal range of motion. She exhibits no edema or deformity.   Neurological: She is alert.   Skin: Skin is warm and dry. Capillary refill takes less than 2 seconds. She is not diaphoretic. No erythema.   Nursing note and vitals reviewed.      Significant Labs:  CBC:  Recent Labs   Lab 09/11/19  0822 09/12/19  0500 09/13/19  0400   WBC 14.33* 12.74* 10.45   RBC 3.43* 2.97* 2.85*   HGB 10.0* 8.5* 8.3*   HCT 32.9* 28.2* 27.1*   * 114* 130*   MCV 96 95 95   MCH 29.2 28.6 29.1   MCHC 30.4* 30.1* 30.6*     BNP:  Recent Labs   Lab 09/06/19  1834 09/11/19  0400 09/13/19  0400   * 976* 987*     CMP:  Recent Labs   Lab 09/11/19  0400 09/11/19  0822 09/12/19  0500 09/12/19  1338 09/13/19  0400   * 156*  156* 128* 99 98   CALCIUM 9.2 9.0  9.0 9.6 9.3 9.6   ALBUMIN 4.1 3.8  --   --  3.6   PROT 6.8 6.5  --   --  6.3   * 140  140 135* 135* 136   K 4.4 4.5  4.5 4.3 4.1 3.5   CO2 25 20*  20* 24 23 25    104  104 100 101 98   BUN 19 22*  22* 27* 28* 31*   CREATININE 1.5* 1.5*  1.5* 1.9* 1.9* 1.6*   ALKPHOS 62 57  --   --  66   ALT 29 29  --   --  171*   * 102*  --   --  172*    BILITOT 1.8* 1.6*  --   --  1.8*      Coagulation:   Recent Labs   Lab 09/11/19  0822 09/12/19  0500 09/12/19  1810 09/13/19  0400 09/13/19  1229   INR 1.1  --  1.2  --  1.2  --    APTT 24.6   < > 29.8 29.0 33.5* 30.1    < > = values in this interval not displayed.     LDH:  Recent Labs   Lab 09/11/19 0822 09/12/19  0500 09/13/19  0400   * 700* 400*     Microbiology:  Microbiology Results (last 7 days)     Procedure Component Value Units Date/Time    Blood culture [[id number]] Collected:  09/11/19 0810    Order Status:  Completed Specimen:  Blood from Peripheral, Antecubital, Left Updated:  09/13/19 1012     Blood Culture, Routine No Growth to date      No Growth to date      No Growth to date    Blood culture [[id number]] Collected:  09/11/19 0832    Order Status:  Completed Specimen:  Blood from Line, Jugular, Internal Right Updated:  09/13/19 1012     Blood Culture, Routine No Growth to date      No Growth to date      No Growth to date    HPV High Risk Genotypes, PCR [[id number]] Collected:  09/11/19 1330    Order Status:  No result Updated:  09/12/19 1006    HPV High Risk Genotypes, PCR [[id number]]     Order Status:  Completed     Blood culture [[id number]] Collected:  09/11/19 0820    Order Status:  Sent Specimen:  Blood from Line, Jugular, Internal Left Updated:  09/11/19 0822          I have reviewed all pertinent labs within the past 24 hours.    Estimated Creatinine Clearance: 55.7 mL/min (A) (based on SCr of 1.6 mg/dL (H)).    Diagnostic Results:  I have reviewed and interpreted all pertinent imaging results/findings within the past 24 hours.    Assessment and Plan:     * Acute on chronic combined systolic and diastolic heart failure  NICM EF 20% s/p LVAD 9/10  RHC: done on admit 9/4/19 RA: 20/ 20/ 18 RV: 60/ 8/ 18 PA: 60/ 32/ 45 PWP: 43/ 70/ 40 . Cardiac output was 2.27 by Fitz. Cardiac index is 1.04 L/min/m2. O2 Sat: PA 34%. AO 95% PVR 2.2 PALMER  VAD settings 9/13: speed 5300 / flow 4.3 / power 3.8  / PI 3.3    - Uneventful post-operative course. Weaned off of epi. Decreasing  to 2.5 from 4 today, and transferring to CTSU  - Pain control with oxycodone 20 mg Q12 hours with dilaudid 1 mg IV Q2 hours PRN  - Heparin gtt until therapeutic INR  - Lasix 10 mg/hr gtt. Consider changing to intermittent tomorrow.  - GDMT in near future    LVAD (left ventricular assist device) present  Procedure: Device Interrogation Including analysis of device parameters  Current Settings: Ventricular Assist Device  Review of device function is stable/unstable stable    TXP LVAD INTERROGATIONS 9/13/2019 9/13/2019 9/13/2019 9/13/2019 9/13/2019 9/13/2019 9/13/2019   Type - - HeartMate3 - - - HeartMate3   Flow 4.2 4.4 4.2 4.3 4.2 4.3 4.3   Speed 5300 5300 5300 5300 5300 5300 5300   PI 3.2 2.8 3.3 3.2 3.3 3.3 3.2   Power (Orellana) 3.7 3.7 3.7 3.7 3.8 3.8 3.7   LSL 4900 4900 4900 4900 4900 4900 4900   Pulsatility - - Pulse - - - Pulse       CKD (chronic kidney disease)  Baseline cr around 1.3-1.4 in early 2019  - Cr 1.6 today  - Renally dose meds  - Avoid nephrotoxic agents    Abnormal CT scan  -focal opacity found at right base.  DDx: atelectasis.  Recommending repeat CT in 3 months    Enlarged thyroid  Ultrasound done and showed enlarged multinodular thyroid which warrants surveillance in one year.   - TSH and fT4 wnl    Ventricular tachyarrhythmia  - HomeaAmiodarone 200 mg PO BID  - Goal K >4 and Mg >2  - Monitor on telemetry    Jatinder Alves MD  Heart Transplant  [facility]

## 2019-09-13 NOTE — PLAN OF CARE
Problem: Adult Inpatient Plan of Care  Goal: Plan of Care Review  Recommendations  Recommendation/Intervention:   1. As medically able, ADAT to Cardiac with texture per SLP.   2. If poor PO intake, recommend adding Optisource OS to all meals.   RD to monitor.    Goals: Patient to receive nutrition by RD follow-up  Nutrition Goal Status: new    Full assessment completed, see RD Note 9/13/2019.

## 2019-09-13 NOTE — ASSESSMENT & PLAN NOTE
BG goal 140 - 180     BG monitoring Ac/hs and low dose correction scale.   No history of DM. If no futher insulin/endocrine needs, will sign off soon.         Discharge recommendations: A1C in 6 months

## 2019-09-13 NOTE — NURSING TRANSFER
Nursing Transfer Note      9/13/2019     Transfer To: [id number] from [id number] @ 1450    Transfer via bed    Transfer to O2, cardiac monitoring    Transported by RNx2, PCT    Medicines sent: Mupirocin ointment, VAD equipment    Chart send with patient: Yes    Notified: Mother and father    Patient reassessed at: (09/13/19, 1515)    Upon arrival to floor: cardiac monitor applied, patient oriented to room, call bell in reach and bed in lowest position

## 2019-09-13 NOTE — PLAN OF CARE
Pt stable throughout day. HM3 with no acute events. Pt pain controlled with PO oxycodone and PCA pump. Epi titrated off. Worked well with PT/OT today. Tolerating clear liquids. Plan to step down.

## 2019-09-13 NOTE — PROGRESS NOTES
Pt and parents AAAO.  Introduced self to them.  Reviewed workbook with pt and how to complete.  I explained she must complete the workbook using her handbook.  Pt verbalized understanding and agreement.  No questions for me at this time.

## 2019-09-13 NOTE — PROGRESS NOTES
"[facility]  Endocrinology  Progress Note    Admit Date: 9/4/2019     Reason for Consult: Management of Hyperglycemia     Surgical Procedure and Date: LVAD 9/10/19    HPI:   Patient is a 48 y.o. female with a diagnosis of NICM, CKD, and HLD. Patient admitted with for advanced heart failure options. No previous history of DM per chart review. A1C elevated on pre-op A1C. Endocrinology consulted post-LVAD for BG management.             Interval HPI:   Overnight events: Remains in ICU. NAEON. LVAD. BG well controlled without insulin.   Eating:   <25% clears  Nausea: No  Hypoglycemia and intervention: No  Fever: no  TPN and/or TF: No      BP (!) 88/0 (BP Location: Left arm, Patient Position: Lying)   Pulse 90   Temp 99 °F (37.2 °C) (Core Bladder)   Resp 14   Ht 5' 7" (1.702 m)   Wt 115 kg (253 lb 8.5 oz)   LMP  (Within Years)   SpO2 96%   Breastfeeding? No   BMI 39.71 kg/m²      Labs Reviewed and Include    Recent Labs   Lab 09/13/19  0400   GLU 98   CALCIUM 9.6   ALBUMIN 3.6   PROT 6.3      K 3.5   CO2 25   CL 98   BUN 31*   CREATININE 1.6*   ALKPHOS 66   *   *   BILITOT 1.8*     Lab Results   Component Value Date    WBC 10.45 09/13/2019    HGB 8.3 (L) 09/13/2019    HCT 27.1 (L) 09/13/2019    MCV 95 09/13/2019     (L) 09/13/2019     Recent Labs   Lab 09/06/19  0846   TSH 2.290   FREET4 1.49     Lab Results   Component Value Date    HGBA1C 6.5 (H) 09/06/2019       Nutritional status:   Body mass index is 39.71 kg/m².  Lab Results   Component Value Date    ALBUMIN 3.6 09/13/2019    ALBUMIN 3.8 09/11/2019    ALBUMIN 4.1 09/11/2019     Lab Results   Component Value Date    PREALBUMIN 22 09/11/2019    PREALBUMIN 32 09/06/2019       Estimated Creatinine Clearance: 55.7 mL/min (A) (based on SCr of 1.6 mg/dL (H)).    Accu-Checks  Recent Labs     09/11/19  0608 09/11/19  0842 09/11/19  1230 09/11/19  1419 09/11/19  1646 09/11/19  2313 09/12/19  0522 09/12/19  0813 " 09/12/19  1333 09/12/19  1811   POCTGLUCOSE 131* 144* 156* 151* 126* 135* 134* 140* 98 91       Current Medications and/or Treatments Impacting Glycemic Control  Immunotherapy:    Immunosuppressants     None        Steroids:   Hormones (From admission, onward)    None        Pressors:    Autonomic Drugs (From admission, onward)    Start     Stop Route Frequency Ordered    09/11/19 1215  EPINEPHrine (ADRENALIN) 5 mg in sodium chloride 0.9% 250 mL infusion     Question Answer Comment   Titrate by: (in mcg/kg/min) 0.03    Titrate interval: (in minutes) 5    Titrate to maintain: (SBP or MAP or Cardiac Index) MAP    Greater than: (in mmHg) 70    Maximum dose: (in mcg/kg/min) 0.1        -- IV Continuous 09/11/19 1213        Hyperglycemia/Diabetes Medications:   Antihyperglycemics (From admission, onward)    Start     Stop Route Frequency Ordered    09/12/19 1306  insulin aspart U-100 pen 0-5 Units      -- SubQ Before meals & nightly PRN 09/12/19 1206          ASSESSMENT and PLAN    * Acute on chronic combined systolic and diastolic heart failure  Managed per primary. S/p LVAD        Acute hyperglycemia  BG goal 140 - 180     BG monitoring Ac/hs and low dose correction scale.               LVAD (left ventricular assist device) present  Managed per primary.   avoid hypoglycemia        CKD (chronic kidney disease)  Titrate insulin slowly to avoid hypoglycemia as the risk of hypoglycemia increases with decreased creatinine clearance.    Estimated Creatinine Clearance: 59.2 mL/min (A) (based on SCr of 1.5 mg/dL (H)).            Consuelo Adler NP  Endocrinology  [facility]

## 2019-09-13 NOTE — PROGRESS NOTES
"[facility]  Adult Nutrition  Progress Note    SUMMARY       Recommendations  Recommendation/Intervention:   1. As medically able, ADAT to Cardiac with texture per SLP.   2. If poor PO intake, recommend adding Optisource OS to all meals.   RD to monitor.    Goals: Patient to receive nutrition by RD follow-up  Nutrition Goal Status: new  Communication of RD Recs: discussed on rounds    Reason for Assessment  Reason For Assessment: RD follow-up  Diagnosis: surgery/postoperative complications(s/p LVAD 9/10)  Relevant Medical History: NICM, HLD  Interdisciplinary Rounds: attended  General Information Comments: S/p LVAD placement 9/10. Extubated . Physical exam remains unchanged, continues to appear nourished.  Nutrition Discharge Planning: Adequate nutrition via PO intake.    Nutrition Risk Screen  Nutrition Risk Screen: no indicators present    Nutrition/Diet History  Patient Reported Diet/Restrictions/Preferences: low salt  Spiritual, Cultural Beliefs, Jainism Practices, Values that Affect Care: no  Factors Affecting Nutritional Intake: clear liquid diet    Anthropometrics  Temp: 99 °F (37.2 °C)  Height Method: Stated  Height: 5' 7" (170.2 cm)  Height (inches): 67 in  Weight Method: Bed Scale  Weight: 115 kg (253 lb 8.5 oz)  Weight (lb): 253.53 lb  Ideal Body Weight (IBW), Female: 135 lb  % Ideal Body Weight, Female (lb): 170.49 lb  BMI (Calculated): 36.1  BMI Grade: 35 - 39.9 - obesity - grade II  Usual Body Weight (UBW), k.5 kg  % Usual Body Weight: 99.15    Lab/Procedures/Meds  Pertinent Labs Reviewed: reviewed  Pertinent Labs Comments: BUN 31, Creat 1.6  Pertinent Medications Reviewed: reviewed  Pertinent Medications Comments: docusate, pantoprazole, coumadin, dobutamine, epinephrine, lasix, cardene    Estimated/Assessed Needs  Weight Used For Calorie Calculations: 104.4 kg (230 lb 2.6 oz)  Energy Calorie Requirements (kcal): 1702 kcal/day  Energy Need Method: [place](no AF " 2/2 obesity)  Protein Requirements: 123 g/day(2 g/kg)  Weight Used For Protein Calculations: 61.4 kg (135 lb 5.8 oz)(IBW)  Fluid Requirements (mL): 1 mL/kcal or per MD  Estimated Fluid Requirement Method: RDA Method  RDA Method (mL): 1702    Nutrition Prescription Ordered  Current Diet Order: SF CL  Nutrition Order Comments: 1500mL FR    Evaluation of Received Nutrient/Fluid Intake  I/O: -1.5L x 24hrs, -7.6L since admit  Comments: LBM 9/9  % Intake of Estimated Energy Needs: 0 - 25 %  % Meal Intake: Other: SF CL    Nutrition Risk  Level of Risk/Frequency of Follow-up: high(2x/week)     Assessment and Plan  Nutrition Problem  Increased nutrient needs    Related to (etiology):   Physiological causes related to healing    Signs and Symptoms (as evidenced by):   S/p LVAD placement 9/10     Interventions (treatment strategy):  Collaboration of nutrition care with other providers    Nutrition Diagnosis Status:   New    Monitor and Evaluation  Food and Nutrient Intake: energy intake, food and beverage intake  Food and Nutrient Adminstration: diet order  Knowledge/Beliefs/Attitudes: food and nutrition knowledge/skill  Physical Activity and Function: nutrition-related ADLs and IADLs  Anthropometric Measurements: weight, weight change  Biochemical Data, Medical Tests and Procedures: electrolyte and renal panel, gastrointestinal profile, inflammatory profile  Nutrition-Focused Physical Findings: overall appearance     Nutrition Follow-Up  RD Follow-up?: Yes

## 2019-09-14 LAB
ANION GAP SERPL CALC-SCNC: 11 MMOL/L (ref 8–16)
APTT BLDCRRT: 26.9 SEC (ref 21–32)
APTT BLDCRRT: 28.2 SEC (ref 21–32)
BASOPHILS # BLD AUTO: 0.03 K/UL (ref 0–0.2)
BASOPHILS NFR BLD: 0.4 % (ref 0–1.9)
BUN SERPL-MCNC: 27 MG/DL (ref 6–20)
CALCIUM SERPL-MCNC: 9.7 MG/DL (ref 8.7–10.5)
CHLORIDE SERPL-SCNC: 96 MMOL/L (ref 95–110)
CO2 SERPL-SCNC: 33 MMOL/L (ref 23–29)
CREAT SERPL-MCNC: 1.3 MG/DL (ref 0.5–1.4)
DIFFERENTIAL METHOD: ABNORMAL
EOSINOPHIL # BLD AUTO: 0.2 K/UL (ref 0–0.5)
EOSINOPHIL NFR BLD: 2.4 % (ref 0–8)
ERYTHROCYTE [DISTWIDTH] IN BLOOD BY AUTOMATED COUNT: 14.7 % (ref 11.5–14.5)
EST. GFR  (AFRICAN AMERICAN): 55.7 ML/MIN/1.73 M^2
EST. GFR  (NON AFRICAN AMERICAN): 48.3 ML/MIN/1.73 M^2
GLUCOSE SERPL-MCNC: 96 MG/DL (ref 70–110)
HCT VFR BLD AUTO: 28.9 % (ref 37–48.5)
HGB BLD-MCNC: 8.9 G/DL (ref 12–16)
IMM GRANULOCYTES # BLD AUTO: 0.04 K/UL (ref 0–0.04)
IMM GRANULOCYTES NFR BLD AUTO: 0.5 % (ref 0–0.5)
INR PPP: 1.1 (ref 0.8–1.2)
LDH SERPL L TO P-CCNC: 343 U/L (ref 110–260)
LYMPHOCYTES # BLD AUTO: 0.7 K/UL (ref 1–4.8)
LYMPHOCYTES NFR BLD: 8.8 % (ref 18–48)
MAGNESIUM SERPL-MCNC: 2.2 MG/DL (ref 1.6–2.6)
MCH RBC QN AUTO: 28.7 PG (ref 27–31)
MCHC RBC AUTO-ENTMCNC: 30.8 G/DL (ref 32–36)
MCV RBC AUTO: 93 FL (ref 82–98)
MONOCYTES # BLD AUTO: 0.9 K/UL (ref 0.3–1)
MONOCYTES NFR BLD: 10.8 % (ref 4–15)
NEUTROPHILS # BLD AUTO: 6.2 K/UL (ref 1.8–7.7)
NEUTROPHILS NFR BLD: 77.1 % (ref 38–73)
NRBC BLD-RTO: 0 /100 WBC
PHOSPHATE SERPL-MCNC: 2.6 MG/DL (ref 2.7–4.5)
PLATELET # BLD AUTO: 194 K/UL (ref 150–350)
PMV BLD AUTO: 10.3 FL (ref 9.2–12.9)
POCT GLUCOSE: 106 MG/DL (ref 70–110)
POCT GLUCOSE: 125 MG/DL (ref 70–110)
POCT GLUCOSE: 131 MG/DL (ref 70–110)
POCT GLUCOSE: 134 MG/DL (ref 70–110)
POCT GLUCOSE: 95 MG/DL (ref 70–110)
POTASSIUM SERPL-SCNC: 3.8 MMOL/L (ref 3.5–5.1)
PROTHROMBIN TIME: 11.2 SEC (ref 9–12.5)
RBC # BLD AUTO: 3.1 M/UL (ref 4–5.4)
SODIUM SERPL-SCNC: 140 MMOL/L (ref 136–145)
WBC # BLD AUTO: 8 K/UL (ref 3.9–12.7)

## 2019-09-14 PROCEDURE — 97535 SELF CARE MNGMENT TRAINING: CPT | Mod: NTX

## 2019-09-14 PROCEDURE — 25000003 PHARM REV CODE 250: Mod: NTX | Performed by: PHYSICIAN ASSISTANT

## 2019-09-14 PROCEDURE — 85025 COMPLETE CBC W/AUTO DIFF WBC: CPT | Mod: NTX

## 2019-09-14 PROCEDURE — 99232 PR SUBSEQUENT HOSPITAL CARE,LEVL II: ICD-10-PCS | Mod: NTX,,, | Performed by: NURSE PRACTITIONER

## 2019-09-14 PROCEDURE — 85610 PROTHROMBIN TIME: CPT | Mod: NTX

## 2019-09-14 PROCEDURE — 63600175 PHARM REV CODE 636 W HCPCS: Mod: NTX | Performed by: INTERNAL MEDICINE

## 2019-09-14 PROCEDURE — 25000003 PHARM REV CODE 250: Mod: NTX | Performed by: THORACIC SURGERY (CARDIOTHORACIC VASCULAR SURGERY)

## 2019-09-14 PROCEDURE — 83735 ASSAY OF MAGNESIUM: CPT | Mod: NTX

## 2019-09-14 PROCEDURE — 25000003 PHARM REV CODE 250: Mod: NTX | Performed by: STUDENT IN AN ORGANIZED HEALTH CARE EDUCATION/TRAINING PROGRAM

## 2019-09-14 PROCEDURE — 99232 PR SUBSEQUENT HOSPITAL CARE,LEVL II: ICD-10-PCS | Mod: NTX,,, | Performed by: INTERNAL MEDICINE

## 2019-09-14 PROCEDURE — 85730 THROMBOPLASTIN TIME PARTIAL: CPT | Mod: 91,NTX

## 2019-09-14 PROCEDURE — 97530 THERAPEUTIC ACTIVITIES: CPT | Mod: NTX

## 2019-09-14 PROCEDURE — 25000003 PHARM REV CODE 250: Mod: NTX | Performed by: INTERNAL MEDICINE

## 2019-09-14 PROCEDURE — 80048 BASIC METABOLIC PNL TOTAL CA: CPT | Mod: NTX

## 2019-09-14 PROCEDURE — 99232 SBSQ HOSP IP/OBS MODERATE 35: CPT | Mod: NTX,,, | Performed by: INTERNAL MEDICINE

## 2019-09-14 PROCEDURE — 36415 COLL VENOUS BLD VENIPUNCTURE: CPT | Mod: NTX

## 2019-09-14 PROCEDURE — 63600175 PHARM REV CODE 636 W HCPCS: Mod: NTX | Performed by: THORACIC SURGERY (CARDIOTHORACIC VASCULAR SURGERY)

## 2019-09-14 PROCEDURE — 20600001 HC STEP DOWN PRIVATE ROOM: Mod: NTX

## 2019-09-14 PROCEDURE — 84100 ASSAY OF PHOSPHORUS: CPT | Mod: NTX

## 2019-09-14 PROCEDURE — 94761 N-INVAS EAR/PLS OXIMETRY MLT: CPT | Mod: NTX

## 2019-09-14 PROCEDURE — 27000221 HC OXYGEN, UP TO 24 HOURS: Mod: NTX

## 2019-09-14 PROCEDURE — 27000248 HC VAD-ADDITIONAL DAY: Mod: NTX

## 2019-09-14 PROCEDURE — 83615 LACTATE (LD) (LDH) ENZYME: CPT | Mod: NTX

## 2019-09-14 PROCEDURE — 99232 SBSQ HOSP IP/OBS MODERATE 35: CPT | Mod: NTX,,, | Performed by: NURSE PRACTITIONER

## 2019-09-14 PROCEDURE — 83605 ASSAY OF LACTIC ACID: CPT | Mod: NTX

## 2019-09-14 PROCEDURE — 93750 INTERROGATION VAD IN PERSON: CPT | Mod: NTX,,, | Performed by: INTERNAL MEDICINE

## 2019-09-14 PROCEDURE — 85730 THROMBOPLASTIN TIME PARTIAL: CPT | Mod: NTX

## 2019-09-14 PROCEDURE — 93750 PR INTERROGATE VENT ASSIST DEV, IN PERSON, W PHYSICIAN ANALYSIS: ICD-10-PCS | Mod: NTX,,, | Performed by: INTERNAL MEDICINE

## 2019-09-14 RX ORDER — SIMETHICONE 80 MG
1 TABLET,CHEWABLE ORAL 3 TIMES DAILY PRN
Status: DISCONTINUED | OUTPATIENT
Start: 2019-09-14 | End: 2019-09-18

## 2019-09-14 RX ORDER — SIMETHICONE 80 MG
1 TABLET,CHEWABLE ORAL ONCE
Status: COMPLETED | OUTPATIENT
Start: 2019-09-14 | End: 2019-09-14

## 2019-09-14 RX ADMIN — SIMETHICONE CHEW TAB 80 MG 80 MG: 80 TABLET ORAL at 11:09

## 2019-09-14 RX ADMIN — PANTOPRAZOLE SODIUM 40 MG: 40 TABLET, DELAYED RELEASE ORAL at 08:09

## 2019-09-14 RX ADMIN — POLYETHYLENE GLYCOL 3350 17 G: 17 POWDER, FOR SOLUTION ORAL at 08:09

## 2019-09-14 RX ADMIN — ONDANSETRON 4 MG: 2 INJECTION INTRAMUSCULAR; INTRAVENOUS at 03:09

## 2019-09-14 RX ADMIN — VENLAFAXINE 150 MG: 37.5 TABLET ORAL at 08:09

## 2019-09-14 RX ADMIN — DOBUTAMINE IN DEXTROSE 2.5 MCG/KG/MIN: 200 INJECTION, SOLUTION INTRAVENOUS at 05:09

## 2019-09-14 RX ADMIN — AMIODARONE HYDROCHLORIDE 200 MG: 200 TABLET ORAL at 08:09

## 2019-09-14 RX ADMIN — MUPIROCIN: 20 OINTMENT TOPICAL at 08:09

## 2019-09-14 RX ADMIN — OXYCODONE HYDROCHLORIDE 20 MG: 20 TABLET, FILM COATED, EXTENDED RELEASE ORAL at 09:09

## 2019-09-14 RX ADMIN — HYDROCORTISONE: 10 CREAM TOPICAL at 09:09

## 2019-09-14 RX ADMIN — AMIODARONE HYDROCHLORIDE 200 MG: 200 TABLET ORAL at 09:09

## 2019-09-14 RX ADMIN — SENNOSIDES,DOCUSATE SODIUM 2 TABLET: 8.6; 5 TABLET, FILM COATED ORAL at 08:09

## 2019-09-14 RX ADMIN — OXYCODONE HYDROCHLORIDE 20 MG: 20 TABLET, FILM COATED, EXTENDED RELEASE ORAL at 08:09

## 2019-09-14 RX ADMIN — HYDROMORPHONE HYDROCHLORIDE 1 MG: 1 INJECTION, SOLUTION INTRAMUSCULAR; INTRAVENOUS; SUBCUTANEOUS at 03:09

## 2019-09-14 RX ADMIN — HEPARIN SODIUM 400 UNITS/HR: 10000 INJECTION, SOLUTION INTRAVENOUS at 05:09

## 2019-09-14 RX ADMIN — FUROSEMIDE 10 MG/HR: 10 INJECTION, SOLUTION INTRAMUSCULAR; INTRAVENOUS at 05:09

## 2019-09-14 RX ADMIN — MUPIROCIN: 20 OINTMENT TOPICAL at 09:09

## 2019-09-14 RX ADMIN — ASPIRIN 325 MG: 325 TABLET, COATED ORAL at 08:09

## 2019-09-14 RX ADMIN — ONDANSETRON 4 MG: 2 INJECTION INTRAMUSCULAR; INTRAVENOUS at 08:09

## 2019-09-14 RX ADMIN — RAMELTEON 8 MG: 8 TABLET ORAL at 09:09

## 2019-09-14 RX ADMIN — WARFARIN SODIUM 5 MG: 5 TABLET ORAL at 04:09

## 2019-09-14 NOTE — SUBJECTIVE & OBJECTIVE
"Interval HPI:   Overnight events: Transferred to CTSU. LVADGabriella DE SOUZA. BG below goal without insulin. Diet advancing.   Eating:   <25%  Nausea: No  Hypoglycemia and intervention: No  Fever: No  TPN and/or TF: No    BP (!) 94/0 (BP Location: Right arm, Patient Position: Lying)   Pulse 60   Temp 98.5 °F (36.9 °C) (Oral)   Resp 20   Ht 5' 7" (1.702 m)   Wt 109 kg (240 lb 4.8 oz)   LMP  (Within Years)   SpO2 (!) 93%   Breastfeeding? No   BMI 37.64 kg/m²     Labs Reviewed and Include    Recent Labs   Lab 09/14/19  0610   GLU 96   CALCIUM 9.7      K 3.8   CO2 33*   CL 96   BUN 27*   CREATININE 1.3     Lab Results   Component Value Date    WBC 8.00 09/14/2019    HGB 8.9 (L) 09/14/2019    HCT 28.9 (L) 09/14/2019    MCV 93 09/14/2019     09/14/2019     No results for input(s): TSH, FREET4 in the last 168 hours.  Lab Results   Component Value Date    HGBA1C 6.5 (H) 09/06/2019       Nutritional status:   Body mass index is 37.64 kg/m².  Lab Results   Component Value Date    ALBUMIN 3.6 09/13/2019    ALBUMIN 3.8 09/11/2019    ALBUMIN 4.1 09/11/2019     Lab Results   Component Value Date    PREALBUMIN 22 09/11/2019    PREALBUMIN 32 09/06/2019       Estimated Creatinine Clearance: 66.6 mL/min (based on SCr of 1.3 mg/dL).    Accu-Checks  Recent Labs     09/11/19  1230 09/11/19  1419 09/11/19  1646 09/11/19  2313 09/12/19  0522 09/12/19  0813 09/12/19  1333 09/12/19  1811 09/13/19  0726 09/13/19  1229   POCTGLUCOSE 156* 151* 126* 135* 134* 140* 98 91 101 112*       Current Medications and/or Treatments Impacting Glycemic Control  Immunotherapy:    Immunosuppressants     None        Steroids:   Hormones (From admission, onward)    None        Pressors:    Autonomic Drugs (From admission, onward)    None        Hyperglycemia/Diabetes Medications:   Antihyperglycemics (From admission, onward)    Start     Stop Route Frequency Ordered    09/12/19 1306  insulin aspart U-100 pen 0-5 Units      -- SubQ Before meals & " nightly PRN 09/12/19 3116

## 2019-09-14 NOTE — NURSING
[phone]  DOBUTamine gtt was set at incorrect rate/dose of 3 mcg/kg/min rate 9.6 ml/hr, reprogrammed correct dose of 2.5 mcg/kg/min. Lasix gtt was almost out, ordered new bag, and Heparin gtt read stopped 09/12 at 1400 hours.  Called SUDHA Curran M.D. to verify Heparin gtt is ordered for infusing.  He stated he will verify with team but feels gtt is supposed to be infusing.  PAUL Bailey Stated PAUL Cabello approved infusion earlier in the morning, but did not restart gtt.  Preveno Wound Vac on/Vacu-pack on chest clean, dry, and intact, and chest tube right to suction.   Adhesive allergies documented.  Patient has skin irritation to right neck (4x4 and tegaderm in place), requested hydrocortisone.  Bruising noted bilateral arms.

## 2019-09-14 NOTE — NURSING
LVAD ES dressing change completed using sterile technique. Minimal serous drainage to old dressing. Sutures intact. Slight redness to skin around driveline. Pt tolerated well. Next dressing change due 9/14/19.

## 2019-09-14 NOTE — SUBJECTIVE & OBJECTIVE
Interval History: Pain better controlled today. Ambulated some yesterday and got up in the chair. Has not had a BM since surgery but would like to. Parents at her bedside.     Continuous Infusions:   DOBUTamine 2.5 mcg/kg/min (09/14/19 0514)    furosemide (LASIX) 2 mg/mL infusion (non-titrating) 10 mg/hr (09/14/19 0512)    heparin (porcine) in 5 % dex 400 Units/hr (09/14/19 0512)     Scheduled Meds:   amiodarone  200 mg Oral BID    aspirin  325 mg Oral Daily    mupirocin   Nasal BID    oxyCODONE  20 mg Oral Q12H    pantoprazole  40 mg Oral Daily    polyethylene glycol  17 g Oral Daily    ramelteon  8 mg Oral QHS    senna-docusate 8.6-50 mg  2 tablet Oral Daily    simethicone  1 tablet Oral Once    venlafaxine  150 mg Oral Daily    warfarin  5 mg Oral Daily     PRN Meds:albumin human 5%, albuterol sulfate, bisacodyl, Dextrose 10% Bolus, Dextrose 10% Bolus, fentaNYL, glucagon (human recombinant), glucose, glucose, hydrocortisone, HYDROmorphone, hydrOXYzine HCl, insulin aspart U-100, magnesium hydroxide 400 mg/5 ml, magnesium sulfate IVPB, ondansetron, simethicone, sodium chloride 0.9%, sodium phosphates    Review of patient's allergies indicates:   Allergen Reactions    Adhesive Blisters     Reaction to area in chest and up only    Codeine Itching     Objective:     Vital Signs (Most Recent):  Temp: 98.5 °F (36.9 °C) (09/14/19 0725)  Pulse: 86 (09/14/19 0753)  Resp: 20 (09/14/19 0753)  BP: (!) 94/0 (09/14/19 0731)  SpO2: 96 % (09/14/19 0753) Vital Signs (24h Range):  Temp:  [98.2 °F (36.8 °C)-99.5 °F (37.5 °C)] 98.5 °F (36.9 °C)  Pulse:  [53-92] 86  Resp:  [16-33] 20  SpO2:  [92 %-99 %] 96 %  BP: ()/(0-76) 94/0  Arterial Line BP: (75-86)/(59-73) 81/68     Patient Vitals for the past 72 hrs (Last 3 readings):   Weight   09/14/19 0535 109 kg (240 lb 4.8 oz)   09/13/19 0400 115 kg (253 lb 8.5 oz)   09/12/19 0400 113.1 kg (249 lb 5.4 oz)     Body mass index is 37.64 kg/m².      Intake/Output Summary  (Last 24 hours) at 9/14/2019 1027  Last data filed at 9/14/2019 0725  Gross per 24 hour   Intake 470 ml   Output 2000 ml   Net -1530 ml       Hemodynamic Parameters:       Physical Exam   Constitutional: She is oriented to person, place, and time. She appears well-developed and well-nourished.   HENT:   Head: Normocephalic and atraumatic.   Neck: JVD (midneck) present.   Cardiovascular: Normal rate and regular rhythm.   vad hum smooth. Skin vac in place to sternal incision   Pulmonary/Chest: Effort normal and breath sounds normal. No stridor. No respiratory distress.   Abdominal: Soft. Bowel sounds are normal. She exhibits no distension. There is no tenderness.   Musculoskeletal: Normal range of motion. She exhibits no edema.   Neurological: She is alert and oriented to person, place, and time.   Skin: Skin is warm and dry.   Psychiatric: She has a normal mood and affect. Her behavior is normal. Judgment and thought content normal.   Nursing note and vitals reviewed.      Significant Labs:  CBC:  Recent Labs   Lab 09/12/19  0500 09/13/19  0400 09/14/19  0610   WBC 12.74* 10.45 8.00   RBC 2.97* 2.85* 3.10*   HGB 8.5* 8.3* 8.9*   HCT 28.2* 27.1* 28.9*   * 130* 194   MCV 95 95 93   MCH 28.6 29.1 28.7   MCHC 30.1* 30.6* 30.8*     BNP:  Recent Labs   Lab 09/11/19  0400 09/13/19  0400   * 987*     CMP:  Recent Labs   Lab 09/11/19  0400 09/11/19  0822  09/12/19  1338 09/13/19  0400 09/14/19  0610   * 156*  156*   < > 99 98 96   CALCIUM 9.2 9.0  9.0   < > 9.3 9.6 9.7   ALBUMIN 4.1 3.8  --   --  3.6  --    PROT 6.8 6.5  --   --  6.3  --    * 140  140   < > 135* 136 140   K 4.4 4.5  4.5   < > 4.1 3.5 3.8   CO2 25 20*  20*   < > 23 25 33*    104  104   < > 101 98 96   BUN 19 22*  22*   < > 28* 31* 27*   CREATININE 1.5* 1.5*  1.5*   < > 1.9* 1.6* 1.3   ALKPHOS 62 57  --   --  66  --    ALT 29 29  --   --  171*  --    * 102*  --   --  172*  --    BILITOT 1.8* 1.6*  --   --  1.8*   --     < > = values in this interval not displayed.      Coagulation:   Recent Labs   Lab 09/12/19  0500  09/13/19  0400  09/13/19  1833 09/14/19  0005 09/14/19 0610   INR 1.2  --  1.2  --   --   --  1.1   APTT 29.8   < > 33.5*   < > 30.1 28.2 26.9    < > = values in this interval not displayed.     LDH:  Recent Labs   Lab 09/12/19  0500 09/13/19  0400 09/14/19 0610   * 400* 343*     Microbiology:  Microbiology Results (last 7 days)     Procedure Component Value Units Date/Time    Blood culture [[id number]] Collected:  09/11/19 0810    Order Status:  Completed Specimen:  Blood from Peripheral, Antecubital, Left Updated:  09/14/19 1012     Blood Culture, Routine No Growth to date      No Growth to date      No Growth to date      No Growth to date    Blood culture [[id number]] Collected:  09/11/19 0832    Order Status:  Completed Specimen:  Blood from Line, Jugular, Internal Right Updated:  09/14/19 1012     Blood Culture, Routine No Growth to date      No Growth to date      No Growth to date      No Growth to date    HPV High Risk Genotypes, PCR [[id number]] Collected:  09/11/19 1330    Order Status:  No result Updated:  09/12/19 1006    HPV High Risk Genotypes, PCR [[id number]]     Order Status:  Completed     Blood culture [[id number]] Collected:  09/11/19 0820    Order Status:  Sent Specimen:  Blood from Line, Jugular, Internal Left Updated:  09/11/19 0822          I have reviewed all pertinent labs within the past 24 hours.    Estimated Creatinine Clearance: 66.6 mL/min (based on SCr of 1.3 mg/dL).    Diagnostic Results:  I have reviewed and interpreted all pertinent imaging results/findings within the past 24 hours.

## 2019-09-14 NOTE — NURSING
"Patient presents as lethargic, reports lightheadedness, dizziness, and nausea. Patient has not been able to ambulate today per team request.  Flavia, sister, stated that during OT Consult patient became diaphoretic (OT wiped her face) and looked as if she was going to pass out. Patient stated, "I just don't feel good."  Called Lynn Rapid Response RN, to assist with assessment.  HR 50-60's today, yesterday 80-90's with decreased HR starting at 1959 hours.  Increased oxygen to 3L nasal cannula, to obtain SPO2 goal = 92%.  BS = 134.  Left lateral abdomen pronounced pulsating motion noted and reported to team (CAROLINA Cruz PA-C and PAUL Lua) earlier in the day.  Appetite is poor.  Patient stated that she walked yesterday and sat in chair prior to arrival to CTSU.  Called Mary Cabrera LVAD Coordinator to update on patient's status.  PAUL Lopez notified and will discuss with PAUL Prieto Charge RN, notified.    "

## 2019-09-14 NOTE — NURSING
Called James, [employer] @ [phone]- [phone] to request suction attachment for wall.  Patient requests Mirtha.  He stated he does not deal with the device and referred to [employer].

## 2019-09-14 NOTE — CARE UPDATE
"RAPID RESPONSE NURSE PROACTIVE ROUNDING NOTE     Time of Visit: 1435    Admit Date: 2019  LOS: 10  Code Status: Full Code   Date of Visit: 2019  : 1970  Age: 48 y.o.  Sex: female  Race: White  Bed: [place] [address]/[facility] 307 A:   MRN: [id number]  Was the patient discharged from an ICU this admission? yes   Was the patient discharged from a PACU within last 24 hours?  no  Did the patient receive conscious sedation/general anesthesia in last 24 hours?  no  Was the patient in the ED within the past 24 hours?  no  Was the patient started on NIPPV within the past 24 hours?  no  Attending Physician: Jolene Lua MD  Primary Service: Networked reference to record PCT     ASSESSMENT     Diagnosis: Acute on chronic combined systolic and diastolic heart failure    Abnormal Vital Signs: /73 (BP Location: Right arm, Patient Position: Lying)   Pulse 60   Temp 98.5 °F (36.9 °C) (Oral)   Resp 20   Ht 5' 7" (1.702 m)   Wt 109 kg (240 lb 4.8 oz)   LMP  (Within Years)   SpO2 (!) 90%   Breastfeeding? No   BMI 37.64 kg/m²      Clinical Issues: Circulatory    Patient  has a past medical history of Fractures, History of ventricular fibrillation, History of ventricular tachycardia, Hyperlipidemia, Migraine, and Osteoarthritis.    Called by primary RN to bedside for patient's increase in lethargy, dizziness, and ongoing nausea. Upon initial assessment, pt. Laying in hospital bed with family at bs. A&ox4, gcs 15, calm, cooperative. Denies cp, sob, palpitations, abd pain, or fever. States "I just don't feel good". Recent discharge from SICU with newly placed LVAD. Has been "too weak" and unable to perform PT and walk around the unit per orders. States dizziness with slight movements, headache, and constant nausea over the last day. Dex per this RN wnl. HR wnl, bp wnl at 80-90/0 dopplered. SpO2 90% on 2L NC O2, increased to 3L NC O2 to maintain parameters >92%. Speaking full sentences, non labored breathing. " Primary team contacted by primary RN, plan to assess the patient. No new orders at this time.      INTERVENTIONS/ RECOMMENDATIONS     Accucheck, vital signs, anti-nausea meds, assessment from primary team/LVAD coordinator.     Discussed plan of care with Dara CERVANTES.    PHYSICIAN ESCALATION     Yes/No  no    Orders received and case discussed with NA.    Disposition: Remain in room 3073.    FOLLOW-UP     Call back the Rapid Response Nurse, Lynn Rico RN at [phone] for additional questions or concerns.

## 2019-09-14 NOTE — ASSESSMENT & PLAN NOTE
-HeartMate 3 Implanted 9/10/19 as DT  -HTS Primary  -Implanted by Dr. Walden  -Continue Coumadin, Goal INR 2.0-3.0. Subtherapeutic today. Continue heparin bridge  -Antiplatelets  mg  -LDH is stable overall today. Will continue to monitor daily.  -Speed set at 5300, LSL 4900 rpm  -Interrogation notable for PI events  -Not listed for OHTx  -Continue  @ 2.5 mcg/kg/min  -Ordered 2D echo , patient unable to tolerate today due to pain at pump site  -Continue lasix @ 10 mg/hr  -Continue Pt/OT/VAD education    Procedure: Device Interrogation Including analysis of device parameters  Current Settings: Ventricular Assist Device  Review of device function is stable/unstable stable    TXP LVAD INTERROGATIONS 9/14/2019 9/14/2019 9/14/2019 9/13/2019 9/13/2019 9/13/2019 9/13/2019   Type HeartMate3 HeartMate3 HeartMate3 HeartMate3 HeartMate3 HeartMate3 -   Flow - 4.0 4.3 4.2 4.4 4.4 4.3   Speed - 5300 5300 5300 5300 5300 5300   PI - 4.1 3.0 3.2 3.8 3.0 3.2   Power (Orellana) - 3.7 3.7 4.8 4.8 3.9 3.8   LSL - 4900 4900 4900 4900 4900 4900   Pulsatility - Intermittent pulse Intermittent pulse Intermittent pulse Intermittent pulse Intermittent pulse -

## 2019-09-14 NOTE — NURSING
"Patient is complaining of left upper abdominal pain.  Assessed & palpated site.  Observed prominent "pulse like" movement at site.  Educated patient that complaint may be gas-related, and it is important that she ambulate, sit in chair for meals, and not lie flat.  Last BM 9/9/19.  Patient reports GI symptoms, passing gas & belching.  Called CAROLINA Cruz PA-C.  Ordered Simethicone, and requested patient ambulate.  "

## 2019-09-14 NOTE — PROGRESS NOTES
"[facility]  Endocrinology  Progress Note    Admit Date: 9/4/2019     Reason for Consult: Management of Hyperglycemia     Surgical Procedure and Date: LVAD 9/10/19    HPI:   Patient is a 48 y.o. female with a diagnosis of NICM, CKD, and HLD. Patient admitted with for advanced heart failure options. No previous history of DM per chart review. A1C elevated on pre-op A1C. Endocrinology consulted post-LVAD for BG management.             Interval HPI:   Overnight events: Transferred to CTSU. LVAD. NAEON. BG below goal without insulin. Diet advancing.   Eating:   <25%  Nausea: No  Hypoglycemia and intervention: No  Fever: No  TPN and/or TF: No    BP (!) 94/0 (BP Location: Right arm, Patient Position: Lying)   Pulse 60   Temp 98.5 °F (36.9 °C) (Oral)   Resp 20   Ht 5' 7" (1.702 m)   Wt 109 kg (240 lb 4.8 oz)   LMP  (Within Years)   SpO2 (!) 93%   Breastfeeding? No   BMI 37.64 kg/m²      Labs Reviewed and Include    Recent Labs   Lab 09/14/19  0610   GLU 96   CALCIUM 9.7      K 3.8   CO2 33*   CL 96   BUN 27*   CREATININE 1.3     Lab Results   Component Value Date    WBC 8.00 09/14/2019    HGB 8.9 (L) 09/14/2019    HCT 28.9 (L) 09/14/2019    MCV 93 09/14/2019     09/14/2019     No results for input(s): TSH, FREET4 in the last 168 hours.  Lab Results   Component Value Date    HGBA1C 6.5 (H) 09/06/2019       Nutritional status:   Body mass index is 37.64 kg/m².  Lab Results   Component Value Date    ALBUMIN 3.6 09/13/2019    ALBUMIN 3.8 09/11/2019    ALBUMIN 4.1 09/11/2019     Lab Results   Component Value Date    PREALBUMIN 22 09/11/2019    PREALBUMIN 32 09/06/2019       Estimated Creatinine Clearance: 66.6 mL/min (based on SCr of 1.3 mg/dL).    Accu-Checks  Recent Labs     09/11/19  1230 09/11/19  1419 09/11/19  1646 09/11/19  2313 09/12/19  0522 09/12/19  0813 09/12/19  1333 09/12/19  1811 09/13/19  0726 09/13/19  1229   POCTGLUCOSE 156* 151* 126* 135* 134* 140* 98 91 101 112* "       Current Medications and/or Treatments Impacting Glycemic Control  Immunotherapy:    Immunosuppressants     None        Steroids:   Hormones (From admission, onward)    None        Pressors:    Autonomic Drugs (From admission, onward)    None        Hyperglycemia/Diabetes Medications:   Antihyperglycemics (From admission, onward)    Start     Stop Route Frequency Ordered    09/12/19 1306  insulin aspart U-100 pen 0-5 Units      -- SubQ Before meals & nightly PRN 09/12/19 1206          ASSESSMENT and PLAN    * Acute on chronic combined systolic and diastolic heart failure  Managed per primary. S/p LVAD        Acute hyperglycemia  BG goal 140 - 180     BG monitoring Ac/hs and low dose correction scale.   No history of DM. If no futher insulin/endocrine needs, will sign off soon.         Discharge recommendations: A1C in 6 months               LVAD (left ventricular assist device) present  Managed per primary.   avoid hypoglycemia        CKD (chronic kidney disease)  Titrate insulin slowly to avoid hypoglycemia as the risk of hypoglycemia increases with decreased creatinine clearance.    Estimated Creatinine Clearance: 55.7 mL/min (A) (based on SCr of 1.6 mg/dL (H)).            Consuelo Adler NP  Endocrinology  [facility]

## 2019-09-14 NOTE — ASSESSMENT & PLAN NOTE
Titrate insulin slowly to avoid hypoglycemia as the risk of hypoglycemia increases with decreased creatinine clearance.  Caution with insulin stacking  Estimated Creatinine Clearance: 66.6 mL/min (based on SCr of 1.3 mg/dL).

## 2019-09-14 NOTE — SUBJECTIVE & OBJECTIVE
"Interval HPI:   Overnight events: Remains in CTSU. LVAD. NOLVIA. BG well controlled without insulin.   Eatin%  Nausea: No  Hypoglycemia and intervention: No  Fever: No  TPN and/or TF: No    BP (!) 78/0 (BP Location: Left arm, Patient Position: Lying)   Pulse 89   Temp 96 °F (35.6 °C) (Axillary)   Resp 20   Ht 5' 7" (1.702 m)   Wt 106 kg (233 lb 11 oz)   LMP  (Within Years)   SpO2 (!) 91%   Breastfeeding? No   BMI 36.60 kg/m²     Labs Reviewed and Include    Recent Labs   Lab 19  0521   *   CALCIUM 10.0   ALBUMIN 3.4*   PROT 6.9      K 3.8   CO2 28   CL 97   BUN 31*   CREATININE 1.3   ALKPHOS 65   *   AST 46*   BILITOT 0.7     Lab Results   Component Value Date    WBC 7.32 2019    HGB 10.7 (L) 2019    HCT 32.9 (L) 2019    MCV 90 2019     2019     No results for input(s): TSH, FREET4 in the last 168 hours.  Lab Results   Component Value Date    HGBA1C 6.5 (H) 2019       Nutritional status:   Body mass index is 36.6 kg/m².  Lab Results   Component Value Date    ALBUMIN 3.4 (L) 2019    ALBUMIN 3.3 (L) 2019    ALBUMIN 3.6 2019     Lab Results   Component Value Date    PREALBUMIN 22 2019    PREALBUMIN 32 2019       Estimated Creatinine Clearance: 65.6 mL/min (based on SCr of 1.3 mg/dL).    Accu-Checks  Recent Labs     19  1457 19  1636 19  2151 09/15/19  0732 09/15/19  1026 09/15/19  1113 09/15/19  1922 19  0008 19  0215 19  0804   POCTGLUCOSE 134* 106 125* 135* 121* 148* 131* 127* 141* 146*       Current Medications and/or Treatments Impacting Glycemic Control  Immunotherapy:    Immunosuppressants     None        Steroids:   Hormones (From admission, onward)    None        Pressors:    Autonomic Drugs (From admission, onward)    None        Hyperglycemia/Diabetes Medications:   Antihyperglycemics (From admission, onward)    Start     Stop Route Frequency Ordered    19 " 1306  insulin aspart U-100 pen 0-5 Units      -- SubQ Before meals & nightly PRN 09/12/19 1201

## 2019-09-14 NOTE — PROGRESS NOTES
09/14/2019  Joaquin Cobb    Current provider:  Jolene Lua MD      I, Joaquin Cobb, rounded on Deborah Navas to ensure all mechanical assist device settings (IABP or VAD) were appropriate and all parameters were within limits.  I was able to ensure all back up equipment was present, the staff had no issues, and the Perfusion Department daily rounding was complete.    4:01 PM

## 2019-09-14 NOTE — PROGRESS NOTES
[facility]  Heart Transplant  Progress Note    Patient Name: Deborah Navas  MRN: [id number]  Admission Date: 9/4/2019  Hospital Length of Stay: 10 days  Attending Physician: Jolene Lua MD  Primary Care Provider: Primary Doctor No  Principal Problem:Acute on chronic combined systolic and diastolic heart failure    Subjective:     Interval History: Pain better controlled today. Ambulated some yesterday and got up in the chair. Has not had a BM since surgery but would like to. Parents at her bedside.     Continuous Infusions:   DOBUTamine 2.5 mcg/kg/min (09/14/19 0514)    furosemide (LASIX) 2 mg/mL infusion (non-titrating) 10 mg/hr (09/14/19 0512)    heparin (porcine) in 5 % dex 400 Units/hr (09/14/19 0512)     Scheduled Meds:   amiodarone  200 mg Oral BID    aspirin  325 mg Oral Daily    mupirocin   Nasal BID    oxyCODONE  20 mg Oral Q12H    pantoprazole  40 mg Oral Daily    polyethylene glycol  17 g Oral Daily    ramelteon  8 mg Oral QHS    senna-docusate 8.6-50 mg  2 tablet Oral Daily    simethicone  1 tablet Oral Once    venlafaxine  150 mg Oral Daily    warfarin  5 mg Oral Daily     PRN Meds:albumin human 5%, albuterol sulfate, bisacodyl, Dextrose 10% Bolus, Dextrose 10% Bolus, fentaNYL, glucagon (human recombinant), glucose, glucose, hydrocortisone, HYDROmorphone, hydrOXYzine HCl, insulin aspart U-100, magnesium hydroxide 400 mg/5 ml, magnesium sulfate IVPB, ondansetron, simethicone, sodium chloride 0.9%, sodium phosphates    Review of patient's allergies indicates:   Allergen Reactions    Adhesive Blisters     Reaction to area in chest and up only    Codeine Itching     Objective:     Vital Signs (Most Recent):  Temp: 98.5 °F (36.9 °C) (09/14/19 0725)  Pulse: 86 (09/14/19 0753)  Resp: 20 (09/14/19 0753)  BP: (!) 94/0 (09/14/19 0731)  SpO2: 96 % (09/14/19 0753) Vital Signs (24h Range):  Temp:  [98.2 °F (36.8 °C)-99.5 °F (37.5 °C)] 98.5 °F (36.9 °C)  Pulse:   [53-92] 86  Resp:  [16-33] 20  SpO2:  [92 %-99 %] 96 %  BP: ()/(0-76) 94/0  Arterial Line BP: (75-86)/(59-73) 81/68     Patient Vitals for the past 72 hrs (Last 3 readings):   Weight   09/14/19 0535 109 kg (240 lb 4.8 oz)   09/13/19 0400 115 kg (253 lb 8.5 oz)   09/12/19 0400 113.1 kg (249 lb 5.4 oz)     Body mass index is 37.64 kg/m².      Intake/Output Summary (Last 24 hours) at 9/14/2019 1027  Last data filed at 9/14/2019 0725  Gross per 24 hour   Intake 470 ml   Output 2000 ml   Net -1530 ml       Hemodynamic Parameters:       Physical Exam   Constitutional: She is oriented to person, place, and time. She appears well-developed and well-nourished.   HENT:   Head: Normocephalic and atraumatic.   Neck: JVD (midneck) present.   Cardiovascular: Normal rate and regular rhythm.   vad hum smooth. Skin vac in place to sternal incision   Pulmonary/Chest: Effort normal and breath sounds normal. No stridor. No respiratory distress.   Abdominal: Soft. Bowel sounds are normal. She exhibits no distension. There is no tenderness.   Musculoskeletal: Normal range of motion. She exhibits no edema.   Neurological: She is alert and oriented to person, place, and time.   Skin: Skin is warm and dry.   Psychiatric: She has a normal mood and affect. Her behavior is normal. Judgment and thought content normal.   Nursing note and vitals reviewed.      Significant Labs:  CBC:  Recent Labs   Lab 09/12/19  0500 09/13/19  0400 09/14/19  0610   WBC 12.74* 10.45 8.00   RBC 2.97* 2.85* 3.10*   HGB 8.5* 8.3* 8.9*   HCT 28.2* 27.1* 28.9*   * 130* 194   MCV 95 95 93   MCH 28.6 29.1 28.7   MCHC 30.1* 30.6* 30.8*     BNP:  Recent Labs   Lab 09/11/19  0400 09/13/19  0400   * 987*     CMP:  Recent Labs   Lab 09/11/19  0400 09/11/19  0822  09/12/19  1338 09/13/19  0400 09/14/19  0610   * 156*  156*   < > 99 98 96   CALCIUM 9.2 9.0  9.0   < > 9.3 9.6 9.7   ALBUMIN 4.1 3.8  --   --  3.6  --    PROT 6.8 6.5  --   --  6.3  --     * 140  140   < > 135* 136 140   K 4.4 4.5  4.5   < > 4.1 3.5 3.8   CO2 25 20*  20*   < > 23 25 33*    104  104   < > 101 98 96   BUN 19 22*  22*   < > 28* 31* 27*   CREATININE 1.5* 1.5*  1.5*   < > 1.9* 1.6* 1.3   ALKPHOS 62 57  --   --  66  --    ALT 29 29  --   --  171*  --    * 102*  --   --  172*  --    BILITOT 1.8* 1.6*  --   --  1.8*  --     < > = values in this interval not displayed.      Coagulation:   Recent Labs   Lab 09/12/19  0500  09/13/19  0400  09/13/19  1833 09/14/19  0005 09/14/19  0610   INR 1.2  --  1.2  --   --   --  1.1   APTT 29.8   < > 33.5*   < > 30.1 28.2 26.9    < > = values in this interval not displayed.     LDH:  Recent Labs   Lab 09/12/19  0500 09/13/19  0400 09/14/19  0610   * 400* 343*     Microbiology:  Microbiology Results (last 7 days)     Procedure Component Value Units Date/Time    Blood culture [[id number]] Collected:  09/11/19 0810    Order Status:  Completed Specimen:  Blood from Peripheral, Antecubital, Left Updated:  09/14/19 1012     Blood Culture, Routine No Growth to date      No Growth to date      No Growth to date      No Growth to date    Blood culture [[id number]] Collected:  09/11/19 0832    Order Status:  Completed Specimen:  Blood from Line, Jugular, Internal Right Updated:  09/14/19 1012     Blood Culture, Routine No Growth to date      No Growth to date      No Growth to date      No Growth to date    HPV High Risk Genotypes, PCR [[id number]] Collected:  09/11/19 1330    Order Status:  No result Updated:  09/12/19 1006    HPV High Risk Genotypes, PCR [[id number]]     Order Status:  Completed     Blood culture [[id number]] Collected:  09/11/19 0820    Order Status:  Sent Specimen:  Blood from Line, Jugular, Internal Left Updated:  09/11/19 0822          I have reviewed all pertinent labs within the past 24 hours.    Estimated Creatinine Clearance: 66.6 mL/min (based on SCr of 1.3 mg/dL).    Diagnostic Results:  I have reviewed and  interpreted all pertinent imaging results/findings within the past 24 hours.    Assessment and Plan:     No notes on file    * Acute on chronic combined systolic and diastolic heart failure  NICM EF 20% s/p LVAD 9/10  RHC: done on admit 9/4/19 RA: 20/ 20/ 18 RV: 60/ 8/ 18 PA: 60/ 32/ 45 PWP: 43/ 70/ 40 . Cardiac output was 2.27 by Fitz. Cardiac index is 1.04 L/min/m2. O2 Sat: PA 34%. AO 95% PVR 2.2 PALMER  VAD settings 9/13: speed 5300 / flow 4.3 / power 3.8 / PI 3.3    -see s/p LVAD      LVAD (left ventricular assist device) present  -HeartMate 3 Implanted 9/10/19 as DT  -HTS Primary  -Implanted by Dr. Walden  -Continue Coumadin, Goal INR 2.0-3.0. Subtherapeutic today. Continue heparin bridge  -Antiplatelets  mg  -LDH is stable overall today. Will continue to monitor daily.  -Speed set at 5300, LSL 4900 rpm  -Interrogation notable for PI events  -Not listed for OHTx  -Continue  @ 2.5 mcg/kg/min  -Ordered 2D echo , patient unable to tolerate today due to pain at pump site  -Continue lasix @ 10 mg/hr  -Continue Pt/OT/VAD education    Procedure: Device Interrogation Including analysis of device parameters  Current Settings: Ventricular Assist Device  Review of device function is stable/unstable stable    TXP LVAD INTERROGATIONS 9/14/2019 9/14/2019 9/14/2019 9/13/2019 9/13/2019 9/13/2019 9/13/2019   Type HeartMate3 HeartMate3 HeartMate3 HeartMate3 HeartMate3 HeartMate3 -   Flow - 4.0 4.3 4.2 4.4 4.4 4.3   Speed - 5300 5300 5300 5300 5300 5300   PI - 4.1 3.0 3.2 3.8 3.0 3.2   Power (Orellana) - 3.7 3.7 4.8 4.8 3.9 3.8   LSL - 4900 4900 4900 4900 4900 4900   Pulsatility - Intermittent pulse Intermittent pulse Intermittent pulse Intermittent pulse Intermittent pulse -       CKD (chronic kidney disease)  Baseline cr around 1.3-1.4 in early 2019  - Cr at baseline today   - Renally dose meds from CrCl 45-30  - Avoid nephrotoxic agents    Abnormal CT scan  -focal opacity found at right base.  DDx: atelectasis.   Recommending repeat CT in 3 months    Enlarged thyroid  Ultrasound done and showed enlarged multinodular thyroid which warrants surveillance in one year.   - TSH and fT4 wnl    Ventricular tachyarrhythmia  - HomeaAmiodarone 200 mg PO BID  - Goal K >4 and Mg >2  - Monitor on telemetry      Continue pain control  PT/OT/VAD education    Adry Cruz PA-C  Heart Transplant  [facility]

## 2019-09-14 NOTE — NURSING
Patient states nausea has subsided, but still feels weak and lightheaded.  Patient's appetite is poor.  Offered crackers for nausea.  Provided jello for lunch and breakfast.  Patient did not eat jello for lunch.

## 2019-09-14 NOTE — CODE/ RAPID DOCUMENTATION
Rapid Response Nurse Follow-up Note     Followed up with patient for proactive rounding.   Nausea better, dizziness remains. Currently sleeping. Reviewed plan of care with primary RNDara.   Please call Rapid Response RN, Lynn Rico RN with any questions or concerns at [phone].

## 2019-09-14 NOTE — PT/OT/SLP PROGRESS
Occupational Therapy   Treatment    Name: Deborah Navas  MRN: [id number]  Admitting Diagnosis:  Acute on chronic combined systolic and diastolic heart failure  4 Days Post-Op s/p LVAD    Recommendations:     Discharge Recommendations: home  Discharge Equipment Recommendations:  none  Barriers to discharge:  None    Assessment:     Deborah Navas is a 48 y.o. female with a medical diagnosis of Acute on chronic combined systolic and diastolic heart failure.  She presents with dizziness and decreased activity tolerance. Performance deficits affecting function are weakness, impaired self care skills, impaired balance, impaired endurance, impaired functional mobilty, gait instability, impaired cardiopulmonary response to activity, decreased upper extremity function, pain, decreased safety awareness.     Rehab Prognosis:  Good; patient would benefit from acute skilled OT services to address these deficits and reach maximum level of function.       Plan:     Patient to be seen 6 x/week to address the above listed problems via self-care/home management, therapeutic activities, therapeutic exercises  · Plan of Care Expires: 10/11/19  · Plan of Care Reviewed with: patient, sibling    Subjective     Pain/Comfort:  · Pain Rating 1: 4/10  · Location - Side 1: Bilateral  · Location - Orientation 1: midline  · Location 1: chest  · Pain Addressed 1: Reposition, Distraction  · Pain Rating Post-Intervention 1: 4/10    Objective:     Communicated with: RN prior to session.  Patient found supine with wound vac, telemetry, PureWick, chest tube, peripheral IV upon OT entry to room.    General Precautions: Standard, LVAD, fall, sternal   Orthopedic Precautions:N/A   Braces:       Occupational Performance:     Bed Mobility:    · Patient completed Supine to Sit with minimum assistance  · Patient completed Sit to Supine with minimum assistance     Functional Mobility/Transfers:  · Patient completed Sit <> Stand Transfer  "with contact guard assistance  with  no assistive device   · Functional Mobility: CGA along side of bed    Activities of Daily Living:  · Grooming: minimum assistance    · Upper Body Dressing: maximal assistance doffing and donning gowns  · Lower Body Dressing: maximal assistance donning socks seated EOB  · Toileting: moderate assistance for pericare in sitting/standing      [facility] 6 Click ADL: 14    Treatment & Education:  Pt ed on OT POC  Pt found soiled, stating, "I just peed on myself."  Pt sat EOB with SBA for prolonged period while changing gowns, completing pericare and transitioning to battery  Pt re-ed on VAD management including LVAD sternal precautions; proper anchor placement and driveline securement for DLES integrity; reason for and performance of Self Test; identifying parts including controller, driveline, power cords, controller pouch, batteries, clips, consolidation bag, battery charger; pairing and rotating batteries; checking battery power; contents of emergency bag and need for having emergency bag when out of room/out of houseEducation:  ; transitioning to/from battery and wall power  Batteries paired and emergency bag set-up  Pt with c/o dizziness while EOB; pursed lip breathing, neutral head alignment and ankle pumps encouraged  Pt with one episode of "near syncope;" unable to obtain BP on monitor; RN aware  Pt returned to supine and to transfer to chair with nursing staff    Patient left supine with all lines intact, call button in reach, RN notified and sister presentEducation:      GOALS:   Multidisciplinary Problems     Occupational Therapy Goals        Problem: Occupational Therapy Goal    Goal Priority Disciplines Outcome Interventions   Occupational Therapy Goal     OT, PT/OT Ongoing (interventions implemented as appropriate)    Description:  Goals to be met by: 10/11/19     Patient will increase functional independence with ADLs by performing:    UE Dressing with Inez.  LE " Dressing with Weston.  Grooming while standing at sink with Weston.  Toileting from toilet with Weston for hygiene and clothing management.   Bathing from  shower chair/bench with Weston.  Toilet transfer to toilet with Weston.  Increased functional strength to WFL for B UE.  Upper extremity exercise program x15 reps per handout, with independence.                      Time Tracking:     OT Date of Treatment: 09/14/19  OT Start Time: 1244  OT Stop Time: 1331  OT Total Time (min): 47 min    Billable Minutes:Self Care/Home Management 27  Therapeutic Activity 20    JAGJIT Byrne  9/14/2019

## 2019-09-14 NOTE — NURSING
Changed HM3 LVAD dressing with soap and water.  DLES #2.  Bloody drainage, redness, and green stitch noted.  Flavia, sister is able to identify supplies needed for dressing change, apply sterile gloves, and explain steps to perform dressing change. Flavia observed dressing change for the 2nd time and will change dressing tomorrow.

## 2019-09-14 NOTE — NURSING
Patient complaining of left upper abdominal discomfort under left breast (near LVAD Pump).  Patient states it is intermittent and not painful at this time.  CAROLINA Cruz PA-C, notified and ordered to call back if abdominal pain results.

## 2019-09-14 NOTE — PLAN OF CARE
Problem: Occupational Therapy Goal  Goal: Occupational Therapy Goal  Goals to be met by: 10/11/19     Patient will increase functional independence with ADLs by performing:    UE Dressing with Rains.  LE Dressing with Rains.  Grooming while standing at sink with Rains.  Toileting from toilet with Rains for hygiene and clothing management.   Bathing from  shower chair/bench with Rains.  Toilet transfer to toilet with Rains.  Increased functional strength to WFL for B UE.  Upper extremity exercise program x15 reps per handout, with independence.     Outcome: Ongoing (interventions implemented as appropriate)  The above goals remain appropriate. JAGJIT Byrne  9/14/2019

## 2019-09-14 NOTE — ASSESSMENT & PLAN NOTE
NICM EF 20% s/p LVAD 9/10  RHC: done on admit 9/4/19 RA: 20/ 20/ 18 RV: 60/ 8/ 18 PA: 60/ 32/ 45 PWP: 43/ 70/ 40 . Cardiac output was 2.27 by Fitz. Cardiac index is 1.04 L/min/m2. O2 Sat: PA 34%. AO 95% PVR 2.2 PALMER  VAD settings 9/13: speed 5300 / flow 4.3 / power 3.8 / PI 3.3    -see s/p LVAD

## 2019-09-14 NOTE — PROGRESS NOTES
09/14/19 1112 09/14/19 1113   Vital Signs   Temp 98.5 °F (36.9 °C)  --    Temp src Oral  --    Pulse (!) 59  --    Heart Rate Source Monitor  --    Resp 20  --    SpO2 96 %  --    Pulse Oximetry Type Intermittent  --    Flow (L/min) 2  --    O2 Device (Oxygen Therapy) nasal cannula  --    BP 91/63 (!) 80/0   MAP (mmHg) 73  --    BP Location Right arm Right arm   BP Method Automatic Doppler   Patient Position Lying Lying   OT consult completed.  Patient reported lightheadedness and dizziness. Patient was able to sit on the edge of the bed briefly.  Patient is not able to stand to assess orthostatics.   Patient repositioned in supine position with HOB elevated.  PAUL Lopez notified.  No new orders at this time.

## 2019-09-14 NOTE — NURSING
Called Izzy, ECHO Lab, [phone] to cancel ECHO per PAUL Lua  Patient notified.  Patient is complaining of left upper abdominal discomfort and distention.

## 2019-09-14 NOTE — ASSESSMENT & PLAN NOTE
BG goal 140 - 180     Patient has no history of DM    Patient has no correction scale insulin needs despite drinking sugary beverage and eating small amounts  Discontinue BG monitoring, recommend monitoring glucose with daily labs    Endocrine to sign off, please re-consult if needed at any time    Discharge recommendations: A1C in 6 months

## 2019-09-14 NOTE — NURSING
Dressing change completed to chest tube site. Moderate serous drainage noted. Occlusive gauze remains intact on R side, where previous tube was removed. Site is reddened with slight oozing drainage noted. Pt tolerated well. Next dressing change due 9/14/19.

## 2019-09-14 NOTE — PLAN OF CARE
Problem: Adult Inpatient Plan of Care  Goal: Plan of Care Review  Outcome: Ongoing (interventions implemented as appropriate)  Reviewed plan of care with patient.  Patient is S/P LVAD placement on 9/10/19.  Patient is alert, oriented x 4, independent, assist x 2 (IV Pole, Chest Tubes, Wound VA), and runs V-Paced/Sinus Vijay on the monitor.  DOBUTamine 2.5 mcg/kg/min.  Lasix gtt @ 10 mg/hr.  Heparin gtt @ 400 u/hr, aPTT daily.  Blood glucose monitoring will be completed 4 times daily before meals and at bedtime, Insulin Aspart PRN.  Heartmate 3 speed set at 5300, LSL 4900, HCT updated, Parameters WNL, No alarms noted, and dressing will be changed with soap and water by sister (1st dressing change).  Preveno Wound VAC in place, Vacu-pack on chest (intact).  OT Consult completed, patient reported lightheadedness.  Right chest tube mediastinal output = 60 ml.  Pure Wic in place, tubing/Pure Wic changed. Patient has allergies to adhesive, hydrocortisone for right neck irritation PRN.  Patient has remained free of falls/trauma/injury by using appropriate lighting, nonskid socks, by keeping area free of debris, call light within reach, and frequent rounding of staff. Patient and family verbalized understanding of all instructions.

## 2019-09-14 NOTE — PLAN OF CARE
Problem: Adult Inpatient Plan of Care  Goal: Plan of Care Review  Outcome: Ongoing (interventions implemented as appropriate)  Plan of care reviewed with patient and sister. Dressing changes completed. Sister given initial instruction on LVAD ES dressing changes, she is eager to learn. Pt currently resting comfortably. Midline chest incision has wound vac attached, functioning properly. Purewick in place, pt deidre well. Heparin, Dobutamine, and lasix all infusing as ordered. Chest tubes present and operating properly. She is paced on tele, LVAD hum smooth. Pt denies questions or concerns at this time. Scheduled pain med administered, pt is comfortable. Will continue to monitor.

## 2019-09-14 NOTE — NURSING
Results for SHELL VILLARREAL (MRN [id number]) as of 9/14/2019 07:37   Ref. Range 9/13/2019 12:29 9/13/2019 12:29 9/13/2019 18:33 9/14/2019 00:05 9/14/2019 06:10   Hematocrit Latest Ref Range: 37.0 - 48.5 %     28.9 (L)   Updated on HM3 monitor.

## 2019-09-15 LAB
ANION GAP SERPL CALC-SCNC: 14 MMOL/L (ref 8–16)
ANION GAP SERPL CALC-SCNC: 15 MMOL/L (ref 8–16)
ANISOCYTOSIS BLD QL SMEAR: SLIGHT
APTT BLDCRRT: 29.2 SEC (ref 21–32)
ASCENDING AORTA: 3.02 CM
BASO STIPL BLD QL SMEAR: ABNORMAL
BASOPHILS # BLD AUTO: 0.02 K/UL (ref 0–0.2)
BASOPHILS NFR BLD: 0.3 % (ref 0–1.9)
BSA FOR ECHO PROCEDURE: 2.24 M2
BUN SERPL-MCNC: 27 MG/DL (ref 6–20)
BUN SERPL-MCNC: 29 MG/DL (ref 6–20)
BURR CELLS BLD QL SMEAR: ABNORMAL
CALCIUM SERPL-MCNC: 9.5 MG/DL (ref 8.7–10.5)
CALCIUM SERPL-MCNC: 9.9 MG/DL (ref 8.7–10.5)
CHLORIDE SERPL-SCNC: 94 MMOL/L (ref 95–110)
CHLORIDE SERPL-SCNC: 96 MMOL/L (ref 95–110)
CO2 SERPL-SCNC: 28 MMOL/L (ref 23–29)
CO2 SERPL-SCNC: 33 MMOL/L (ref 23–29)
CREAT SERPL-MCNC: 1.2 MG/DL (ref 0.5–1.4)
CREAT SERPL-MCNC: 1.3 MG/DL (ref 0.5–1.4)
CV ECHO LV RWT: 0.29 CM
DACRYOCYTES BLD QL SMEAR: ABNORMAL
DIFFERENTIAL METHOD: ABNORMAL
DOP CALC LVOT AREA: 3 CM2
DOP CALC LVOT DIAMETER: 1.94 CM
E WAVE DECELERATION TIME: 86.18 MSEC
E/A RATIO: 1.58
E/E' RATIO: 12.86 M/S
ECHO LV POSTERIOR WALL: 0.77 CM (ref 0.6–1.1)
EOSINOPHIL # BLD AUTO: 0.2 K/UL (ref 0–0.5)
EOSINOPHIL NFR BLD: 2.2 % (ref 0–8)
ERYTHROCYTE [DISTWIDTH] IN BLOOD BY AUTOMATED COUNT: 14.6 % (ref 11.5–14.5)
EST. GFR  (AFRICAN AMERICAN): 55.7 ML/MIN/1.73 M^2
EST. GFR  (AFRICAN AMERICAN): >60 ML/MIN/1.73 M^2
EST. GFR  (NON AFRICAN AMERICAN): 48.3 ML/MIN/1.73 M^2
EST. GFR  (NON AFRICAN AMERICAN): 53.2 ML/MIN/1.73 M^2
FRACTIONAL SHORTENING: 12 % (ref 28–44)
GLUCOSE SERPL-MCNC: 129 MG/DL (ref 70–110)
GLUCOSE SERPL-MCNC: 134 MG/DL (ref 70–110)
HCT VFR BLD AUTO: 33.2 % (ref 37–48.5)
HGB BLD-MCNC: 10.4 G/DL (ref 12–16)
HYPOCHROMIA BLD QL SMEAR: ABNORMAL
IMM GRANULOCYTES # BLD AUTO: 0.05 K/UL (ref 0–0.04)
IMM GRANULOCYTES NFR BLD AUTO: 0.7 % (ref 0–0.5)
INR PPP: 1.5 (ref 0.8–1.2)
INTERVENTRICULAR SEPTUM: 0.81 CM (ref 0.6–1.1)
LA MAJOR: 5.46 CM
LA MINOR: 5.51 CM
LA WIDTH: 3.76 CM
LACTATE SERPL-SCNC: 0.9 MMOL/L (ref 0.5–2.2)
LDH SERPL L TO P-CCNC: 342 U/L (ref 110–260)
LEFT ATRIUM SIZE: 3.8 CM
LEFT ATRIUM VOLUME INDEX: 30.9 ML/M2
LEFT ATRIUM VOLUME: 66.61 CM3
LEFT INTERNAL DIMENSION IN SYSTOLE: 4.67 CM (ref 2.1–4)
LEFT VENTRICLE DIASTOLIC VOLUME INDEX: 62.86 ML/M2
LEFT VENTRICLE DIASTOLIC VOLUME: 135.64 ML
LEFT VENTRICLE MASS INDEX: 69 G/M2
LEFT VENTRICLE SYSTOLIC VOLUME INDEX: 46.6 ML/M2
LEFT VENTRICLE SYSTOLIC VOLUME: 100.61 ML
LEFT VENTRICULAR INTERNAL DIMENSION IN DIASTOLE: 5.31 CM (ref 3.5–6)
LEFT VENTRICULAR MASS: 148.16 G
LV LATERAL E/E' RATIO: 11.25 M/S
LV SEPTAL E/E' RATIO: 15 M/S
LYMPHOCYTES # BLD AUTO: 0.6 K/UL (ref 1–4.8)
LYMPHOCYTES NFR BLD: 8.8 % (ref 18–48)
MAGNESIUM SERPL-MCNC: 2.1 MG/DL (ref 1.6–2.6)
MCH RBC QN AUTO: 28.6 PG (ref 27–31)
MCHC RBC AUTO-ENTMCNC: 31.3 G/DL (ref 32–36)
MCV RBC AUTO: 91 FL (ref 82–98)
MONOCYTES # BLD AUTO: 0.8 K/UL (ref 0.3–1)
MONOCYTES NFR BLD: 10.9 % (ref 4–15)
MV PEAK A VEL: 0.57 M/S
MV PEAK E VEL: 0.9 M/S
NEUTROPHILS # BLD AUTO: 5.5 K/UL (ref 1.8–7.7)
NEUTROPHILS NFR BLD: 77.1 % (ref 38–73)
NRBC BLD-RTO: 1 /100 WBC
OVALOCYTES BLD QL SMEAR: ABNORMAL
PISA TR MAX VEL: 2.08 M/S
PLATELET # BLD AUTO: 254 K/UL (ref 150–350)
PLATELET BLD QL SMEAR: ABNORMAL
PMV BLD AUTO: 9.9 FL (ref 9.2–12.9)
POCT GLUCOSE: 121 MG/DL (ref 70–110)
POCT GLUCOSE: 131 MG/DL (ref 70–110)
POCT GLUCOSE: 135 MG/DL (ref 70–110)
POCT GLUCOSE: 148 MG/DL (ref 70–110)
POIKILOCYTOSIS BLD QL SMEAR: SLIGHT
POLYCHROMASIA BLD QL SMEAR: ABNORMAL
POTASSIUM SERPL-SCNC: 2.9 MMOL/L (ref 3.5–5.1)
POTASSIUM SERPL-SCNC: 3.2 MMOL/L (ref 3.5–5.1)
PROTHROMBIN TIME: 15.1 SEC (ref 9–12.5)
RA MAJOR: 4.7 CM
RA WIDTH: 3.62 CM
RBC # BLD AUTO: 3.64 M/UL (ref 4–5.4)
RIGHT VENTRICULAR END-DIASTOLIC DIMENSION: 4.53 CM
RV TISSUE DOPPLER FREE WALL SYSTOLIC VELOCITY 1 (APICAL 4 CHAMBER VIEW): 6.99 CM/S
SINUS: 3.21 CM
SODIUM SERPL-SCNC: 139 MMOL/L (ref 136–145)
SODIUM SERPL-SCNC: 141 MMOL/L (ref 136–145)
STJ: 2.93 CM
TDI LATERAL: 0.08 M/S
TDI SEPTAL: 0.06 M/S
TDI: 0.07 M/S
TR MAX PG: 17 MMHG
TRICUSPID ANNULAR PLANE SYSTOLIC EXCURSION: 0.94 CM
WBC # BLD AUTO: 7.16 K/UL (ref 3.9–12.7)

## 2019-09-15 PROCEDURE — 25000003 PHARM REV CODE 250: Mod: NTX | Performed by: STUDENT IN AN ORGANIZED HEALTH CARE EDUCATION/TRAINING PROGRAM

## 2019-09-15 PROCEDURE — 93010 EKG 12-LEAD: ICD-10-PCS | Mod: 76,NTX,, | Performed by: INTERNAL MEDICINE

## 2019-09-15 PROCEDURE — 80048 BASIC METABOLIC PNL TOTAL CA: CPT | Mod: NTX

## 2019-09-15 PROCEDURE — 25000003 PHARM REV CODE 250: Mod: NTX | Performed by: INTERNAL MEDICINE

## 2019-09-15 PROCEDURE — 63600175 PHARM REV CODE 636 W HCPCS: Mod: NTX | Performed by: INTERNAL MEDICINE

## 2019-09-15 PROCEDURE — 93750 INTERROGATION VAD IN PERSON: CPT | Mod: NTX,,, | Performed by: INTERNAL MEDICINE

## 2019-09-15 PROCEDURE — 85025 COMPLETE CBC W/AUTO DIFF WBC: CPT | Mod: NTX

## 2019-09-15 PROCEDURE — 93750 PR INTERROGATE VENT ASSIST DEV, IN PERSON, W PHYSICIAN ANALYSIS: ICD-10-PCS | Mod: NTX,,, | Performed by: INTERNAL MEDICINE

## 2019-09-15 PROCEDURE — 83735 ASSAY OF MAGNESIUM: CPT | Mod: NTX

## 2019-09-15 PROCEDURE — 20600001 HC STEP DOWN PRIVATE ROOM: Mod: NTX

## 2019-09-15 PROCEDURE — 99233 SBSQ HOSP IP/OBS HIGH 50: CPT | Mod: NTX,,, | Performed by: INTERNAL MEDICINE

## 2019-09-15 PROCEDURE — 36415 COLL VENOUS BLD VENIPUNCTURE: CPT | Mod: NTX

## 2019-09-15 PROCEDURE — 80048 BASIC METABOLIC PNL TOTAL CA: CPT | Mod: 91,NTX

## 2019-09-15 PROCEDURE — 63600175 PHARM REV CODE 636 W HCPCS: Mod: NTX | Performed by: PHYSICIAN ASSISTANT

## 2019-09-15 PROCEDURE — 93005 ELECTROCARDIOGRAM TRACING: CPT | Mod: NTX

## 2019-09-15 PROCEDURE — 99233 PR SUBSEQUENT HOSPITAL CARE,LEVL III: ICD-10-PCS | Mod: NTX,,, | Performed by: INTERNAL MEDICINE

## 2019-09-15 PROCEDURE — 97530 THERAPEUTIC ACTIVITIES: CPT | Mod: NTX

## 2019-09-15 PROCEDURE — 93010 ELECTROCARDIOGRAM REPORT: CPT | Mod: NTX,,, | Performed by: INTERNAL MEDICINE

## 2019-09-15 PROCEDURE — 85610 PROTHROMBIN TIME: CPT | Mod: NTX

## 2019-09-15 PROCEDURE — 25000003 PHARM REV CODE 250: Mod: NTX | Performed by: THORACIC SURGERY (CARDIOTHORACIC VASCULAR SURGERY)

## 2019-09-15 PROCEDURE — 93010 ELECTROCARDIOGRAM REPORT: CPT | Mod: 76,NTX,, | Performed by: INTERNAL MEDICINE

## 2019-09-15 PROCEDURE — 27000248 HC VAD-ADDITIONAL DAY: Mod: NTX

## 2019-09-15 PROCEDURE — 83615 LACTATE (LD) (LDH) ENZYME: CPT | Mod: NTX

## 2019-09-15 PROCEDURE — 85730 THROMBOPLASTIN TIME PARTIAL: CPT | Mod: NTX

## 2019-09-15 RX ORDER — POTASSIUM CHLORIDE 20 MEQ/1
60 TABLET, EXTENDED RELEASE ORAL ONCE
Status: COMPLETED | OUTPATIENT
Start: 2019-09-15 | End: 2019-09-15

## 2019-09-15 RX ORDER — FUROSEMIDE 10 MG/ML
20 INJECTION INTRAMUSCULAR; INTRAVENOUS ONCE
Status: DISCONTINUED | OUTPATIENT
Start: 2019-09-15 | End: 2019-09-15

## 2019-09-15 RX ORDER — ONDANSETRON 2 MG/ML
4 INJECTION INTRAMUSCULAR; INTRAVENOUS ONCE
Status: COMPLETED | OUTPATIENT
Start: 2019-09-15 | End: 2019-09-15

## 2019-09-15 RX ORDER — FUROSEMIDE 10 MG/ML
80 INJECTION INTRAMUSCULAR; INTRAVENOUS ONCE
Status: COMPLETED | OUTPATIENT
Start: 2019-09-15 | End: 2019-09-15

## 2019-09-15 RX ADMIN — RAMELTEON 8 MG: 8 TABLET ORAL at 08:09

## 2019-09-15 RX ADMIN — FUROSEMIDE 20 MG/HR: 10 INJECTION, SOLUTION INTRAMUSCULAR; INTRAVENOUS at 10:09

## 2019-09-15 RX ADMIN — POTASSIUM CHLORIDE 60 MEQ: 20 TABLET, EXTENDED RELEASE ORAL at 11:09

## 2019-09-15 RX ADMIN — ASPIRIN 325 MG: 325 TABLET, COATED ORAL at 09:09

## 2019-09-15 RX ADMIN — WARFARIN SODIUM 5 MG: 5 TABLET ORAL at 04:09

## 2019-09-15 RX ADMIN — OXYCODONE HYDROCHLORIDE 20 MG: 20 TABLET, FILM COATED, EXTENDED RELEASE ORAL at 09:09

## 2019-09-15 RX ADMIN — VENLAFAXINE 150 MG: 37.5 TABLET ORAL at 09:09

## 2019-09-15 RX ADMIN — ONDANSETRON 4 MG: 2 INJECTION INTRAMUSCULAR; INTRAVENOUS at 09:09

## 2019-09-15 RX ADMIN — POLYETHYLENE GLYCOL 3350 17 G: 17 POWDER, FOR SOLUTION ORAL at 09:09

## 2019-09-15 RX ADMIN — ONDANSETRON 4 MG: 2 INJECTION INTRAMUSCULAR; INTRAVENOUS at 02:09

## 2019-09-15 RX ADMIN — ONDANSETRON 4 MG: 2 INJECTION INTRAMUSCULAR; INTRAVENOUS at 05:09

## 2019-09-15 RX ADMIN — OXYCODONE HYDROCHLORIDE 20 MG: 20 TABLET, FILM COATED, EXTENDED RELEASE ORAL at 08:09

## 2019-09-15 RX ADMIN — SENNOSIDES,DOCUSATE SODIUM 2 TABLET: 8.6; 5 TABLET, FILM COATED ORAL at 09:09

## 2019-09-15 RX ADMIN — DOBUTAMINE IN DEXTROSE 2.5 MCG/KG/MIN: 200 INJECTION, SOLUTION INTRAVENOUS at 05:09

## 2019-09-15 RX ADMIN — POTASSIUM CHLORIDE 60 MEQ: 20 TABLET, EXTENDED RELEASE ORAL at 06:09

## 2019-09-15 RX ADMIN — BISACODYL 10 MG RECTAL SUPPOSITORY 10 MG: at 02:09

## 2019-09-15 RX ADMIN — MUPIROCIN: 20 OINTMENT TOPICAL at 09:09

## 2019-09-15 RX ADMIN — FUROSEMIDE 20 MG/HR: 10 INJECTION, SOLUTION INTRAMUSCULAR; INTRAVENOUS at 01:09

## 2019-09-15 RX ADMIN — FUROSEMIDE 80 MG: 10 INJECTION, SOLUTION INTRAMUSCULAR; INTRAVENOUS at 01:09

## 2019-09-15 RX ADMIN — HYDROMORPHONE HYDROCHLORIDE 1 MG: 1 INJECTION, SOLUTION INTRAMUSCULAR; INTRAVENOUS; SUBCUTANEOUS at 05:09

## 2019-09-15 RX ADMIN — PANTOPRAZOLE SODIUM 40 MG: 40 TABLET, DELAYED RELEASE ORAL at 09:09

## 2019-09-15 NOTE — NURSING
ECHO was canceled for 9/14/19 because patient was not feeling well and pulsating of device was bothersome causing fatigue per patient's report.  DELFINO Smith. notified and approved ECHO for today.  Izzy notified staff it was first ECHO after LVAD and inquired if full ECHO was needed.  DELFINO Smith. approved.

## 2019-09-15 NOTE — PROGRESS NOTES
Pt's labs has not been collected, called [facility] ([id number]) and they are no their way to collect labs now. Will continue to monitor.

## 2019-09-15 NOTE — PLAN OF CARE
Problem: Adult Inpatient Plan of Care  Goal: Plan of Care Review  Outcome: Ongoing (interventions implemented as appropriate)  Pt free of falls/traumas/injuries. Skin remains clean, dry, and intact. Chest tube sites CDI, wound vac CDI, MSI well approximated. Pt has a pending stat US to r/o AAA. MD is aware it will be a while for US. Dr. Dougherty with CTS came and assessed pt's pulsating Abd. Fonseca in place for urinary retention. Pt continued on , lasix IVP 80 mg given and gtt increased to 20mg/hr, heparin gtt at constant rate 400 units.  Pt re-educated on importance of measuring accurate intake and out put; pt verbalized and demonstrates understanding. Reviewed plan of care with pt; and pt verbalized understanding.  Pt AAOX4, VSS, VAD numbers WDL, and in no distress will continue to monitor.

## 2019-09-15 NOTE — ASSESSMENT & PLAN NOTE
-[employer] CRT-D  -Appears patient HR dropped to lower limit of 60 bpm and has been ventricular-paced over last few days  -Will hold amiodarone this morning. Concerned that her ventricular pacing may be causing phrenic nerve irritation which is the cause of the pulsation/twitching that is causing her discomfort. EP consulted to adjust device settings

## 2019-09-15 NOTE — NURSING
Driveline dressing changed with soap and water by Flavia, family/sister.  DLES #2.  Skin surrounding driveline entry site is red, swollen, green stitch intact.  No odor or drainage noted.  Flavia is able to identify all supplies needed for dressing change.  She completed task very well for first attempt, sterile technique needs improvement, but she will learn quickly.  Next dressing change due 9/16/19.  She also changed chest tube dressing, site is clean, dry, and intact.

## 2019-09-15 NOTE — NURSING
Lasix stopped.  Patient is complaining of nausea,  offered crackers, patient is eating them, will administer Zofran x 30 minutes, last doses given @ 0540 hours, and a one time dose at 0919 hours.

## 2019-09-15 NOTE — NURSING
"PAUL Smith requested orthostatics.  Patient was not able to be repositioned to sitting on edge of bed without complaints of dizziness earlier today with 2 assists.  Asked patient to participate with orthostatic assessment now, she refused at this time stating she, "I just can't do it right now."  DELFINO Smith. notified.  Ordered to stop Lasix and will resume tomorrow.  "

## 2019-09-15 NOTE — CONSULTS
[facility]  Cardiac Electrophysiology  Consult Note    Admission Date: 9/4/2019  Code Status: Full Code   Attending Provider: Jolene Lua MD  Consulting Provider: Tristen Carmona MD  Principal Problem:Acute on chronic combined systolic and diastolic heart failure    Inpatient consult to Electrophysiology  Consult performed by: Tristen Carmona MD  Consult ordered by: Adry Cruz PA-C        Subjective:     Chief Complaint:  Diaphragmatic stimulation    HPI:   48 F with PMH NICM s/p LVAD (HM3 9/10/19), medtronic CRT-D, HLD, remote VF with appropriate shocks consulted to EP for diaphragmatic stimulation with pacing. Patient reports rhythmic contractions of her diaphragm. No recent device shocks, light headedness or dizziness. Reports severe fatigue and shortness of breath prior to admission. Post-op pain well controlled. Reports no further symptoms or complaints at this time.    Review of Systems   All other systems reviewed and are negative.    Objective:     Vital Signs (Most Recent):  Temp: 97.8 °F (36.6 °C) (09/15/19 1115)  Pulse: (!) 31 (09/15/19 1115)  Resp: 16 (09/15/19 1115)  BP: (!) 70/0 (09/15/19 1120)  SpO2: (!) 94 % (09/15/19 1000) Vital Signs (24h Range):  Temp:  [97.5 °F (36.4 °C)-98.9 °F (37.2 °C)] 97.8 °F (36.6 °C)  Pulse:  [31-74] 31  Resp:  [16-20] 16  SpO2:  [90 %-96 %] 94 %  BP: ()/(0-73) 70/0     Weight: 105.8 kg (233 lb 4 oz)  Body mass index is 36.53 kg/m².     SpO2: (!) 94 %  O2 Device (Oxygen Therapy): nasal cannula    Physical Exam   Constitutional: She is oriented to person, place, and time. She appears well-developed and well-nourished. No distress.   HENT:   Head: Normocephalic and atraumatic.   Neck: No JVD present.   Cardiovascular:   VAD heard throughout precordium. S/p sternotomy, chest tube and wound vac in place   Pulmonary/Chest: Effort normal and breath sounds normal. No respiratory distress. She has no wheezes. She has no rales.   Abdominal:  Soft. Bowel sounds are normal. She exhibits no distension. There is no tenderness.   Musculoskeletal: She exhibits no edema.   Diaphragmatic contractions which each V-paced beat. Stops when pacing inhibited.    Neurological: She is alert and oriented to person, place, and time.   Skin: She is not diaphoretic.       Significant Labs:     Recent Results (from the past 24 hour(s))   POCT glucose    Collection Time: 09/14/19  2:57 PM   Result Value Ref Range    POCT Glucose 134 (H) 70 - 110 mg/dL   POCT glucose    Collection Time: 09/14/19  4:36 PM   Result Value Ref Range    POCT Glucose 106 70 - 110 mg/dL   POCT glucose    Collection Time: 09/14/19  9:51 PM   Result Value Ref Range    POCT Glucose 125 (H) 70 - 110 mg/dL   Lactic acid, plasma    Collection Time: 09/14/19 11:45 PM   Result Value Ref Range    Lactate (Lactic Acid) 0.9 0.5 - 2.2 mmol/L   POCT glucose    Collection Time: 09/15/19  7:32 AM   Result Value Ref Range    POCT Glucose 135 (H) 70 - 110 mg/dL   CBC auto differential    Collection Time: 09/15/19  9:19 AM   Result Value Ref Range    WBC 7.16 3.90 - 12.70 K/uL    RBC 3.64 (L) 4.00 - 5.40 M/uL    Hemoglobin 10.4 (L) 12.0 - 16.0 g/dL    Hematocrit 33.2 (L) 37.0 - 48.5 %    Mean Corpuscular Volume 91 82 - 98 fL    Mean Corpuscular Hemoglobin 28.6 27.0 - 31.0 pg    Mean Corpuscular Hemoglobin Conc 31.3 (L) 32.0 - 36.0 g/dL    RDW 14.6 (H) 11.5 - 14.5 %    Platelets 254 150 - 350 K/uL    MPV 9.9 9.2 - 12.9 fL    Immature Granulocytes 0.7 (H) 0.0 - 0.5 %    Gran # (ANC) 5.5 1.8 - 7.7 K/uL    Immature Grans (Abs) 0.05 (H) 0.00 - 0.04 K/uL    Lymph # 0.6 (L) 1.0 - 4.8 K/uL    Mono # 0.8 0.3 - 1.0 K/uL    Eos # 0.2 0.0 - 0.5 K/uL    Baso # 0.02 0.00 - 0.20 K/uL    nRBC 1 (A) 0 /100 WBC    Gran% 77.1 (H) 38.0 - 73.0 %    Lymph% 8.8 (L) 18.0 - 48.0 %    Mono% 10.9 4.0 - 15.0 %    Eosinophil% 2.2 0.0 - 8.0 %    Basophil% 0.3 0.0 - 1.9 %    Platelet Estimate Appears normal     Aniso Slight     Poik Slight      Poly Occasional     Hypo Occasional     Ovalocytes Occasional     Tear Drop Cells Occasional     [place]vis Cells Occasional     Basophilic Stippling Occasional     Differential Method Automated    Basic metabolic panel    Collection Time: 09/15/19  9:19 AM   Result Value Ref Range    Sodium 141 136 - 145 mmol/L    Potassium 2.9 (L) 3.5 - 5.1 mmol/L    Chloride 94 (L) 95 - 110 mmol/L    CO2 33 (H) 23 - 29 mmol/L    Glucose 129 (H) 70 - 110 mg/dL    BUN, Bld 29 (H) 6 - 20 mg/dL    Creatinine 1.3 0.5 - 1.4 mg/dL    Calcium 9.9 8.7 - 10.5 mg/dL    Anion Gap 14 8 - 16 mmol/L    eGFR if African American 55.7 (A) >60 mL/min/1.73 m^2    eGFR if non  48.3 (A) >60 mL/min/1.73 m^2   Protime-INR    Collection Time: 09/15/19  9:19 AM   Result Value Ref Range    Prothrombin Time 15.1 (H) 9.0 - 12.5 sec    INR 1.5 (H) 0.8 - 1.2   Lactate dehydrogenase    Collection Time: 09/15/19  9:19 AM   Result Value Ref Range     (H) 110 - 260 U/L   APTT    Collection Time: 09/15/19  9:19 AM   Result Value Ref Range    aPTT 29.2 21.0 - 32.0 sec   Magnesium    Collection Time: 09/15/19  9:19 AM   Result Value Ref Range    Magnesium 2.1 1.6 - 2.6 mg/dL   POCT glucose    Collection Time: 09/15/19 10:26 AM   Result Value Ref Range    POCT Glucose 121 (H) 70 - 110 mg/dL   POCT glucose    Collection Time: 09/15/19 11:13 AM   Result Value Ref Range    POCT Glucose 148 (H) 70 - 110 mg/dL         Significant Imaging:     I have reviewed all pertinent imaging studies from the last 24 hours                Assessment and Plan:     Cardiac defibrillator in situ  With diaphragmatic stimulation with V-pacing  Resolved with switching LV 1-2 pacing with LV 4-3 pacing  Mode switched to VVIR    Of note, patient became light headed and experienced brief syncope for a few seconds after sitting up with OT, no VAD alarms, no events on ICD interrogation. HR read 36 on telemetry. We repeated this maneuver with the ICD  running, the patient  experienced the same symptoms and had low heart rates in the 30's and 40's while the ICD EGM revealed a stable rate at 60. These low heart rates on telemetry appear to be artifact due to LVAD interference.        Thank you for your consult. I will follow-up with patient. Please contact us if you have any additional questions.    Tristen Carmona MD  Cardiac Electrophysiology  [facility]

## 2019-09-15 NOTE — NURSING
"PAUL Carmona at bedside, adjustments made to ICD/Pacemaker, lowering pacer per patient report. Pronounced left upper abdominal pulsating stopped.  Patient stated, "I feel so much better.    "

## 2019-09-15 NOTE — NURSING
Spoke to LVAD coordinator, Mary Tavares, pt has pronounced rythmnic pulsation of abdominal area. Coordinator aware, MD also aware per coordinator. No further orders at this time, will continue to monitor.

## 2019-09-15 NOTE — PLAN OF CARE
Pt with abdominal pulsations, noted worse in the morning, improved but persistent now. No active pain or tenderness on exam. Vitals stable. No appetite and has not been eating much. No BM since before surgery. Pulsations seems to be originating in the upper abdomen, holding breath seems to have subsided. DL site tube and sternal wound draining with no active tenderness, warmth. Will do CT chest/abdomen/pelvis without contrast, US abdomen for aorta. Lactic acid.  Cannot rule out AAA/diaphragmatic pulsations.   Radiology informed no need for contrast for AAA assessment  D/W Dr. Lua and Mary Payton    Update: RN noted - pt's Mediastinal CT was not to suction; abd area that was pulsating was not pulsating as before. Then placed pt to wall suction and some pulsation noted. VAD numbers are WDL.    CTS consuted, informed of CT findings as well/no AAA. Will come and evaluate pt. Also CT with moderate effusions,  ml at beginning of shift with not much UO yesterday. Increased lasix to 20 mg /hr with lasix 80mg IV push. Xray abdomen with moderate stool. Bowel regimen.    750 ml UO with increased lasix. US abdomen for AAA

## 2019-09-15 NOTE — NURSING
Results for SHELL VILLARREAL (MRN [id number]) as of 9/15/2019 17:52   Ref. Range 9/14/2019 23:45 9/15/2019 07:32 9/15/2019 09:19 9/15/2019 10:26 9/15/2019 11:13 9/15/2019 11:45 9/15/2019 13:30 9/15/2019 13:45   Potassium Latest Ref Range: 3.5 - 5.1 mmol/L   2.9 (L)     3.2 (L)   PAUL Smith Notified.  Ordered KCl+ SA CR Tab 60 mEq once.

## 2019-09-15 NOTE — PROGRESS NOTES
09/15/19 1115 09/15/19 1120   Vital Signs   Temp 97.8 °F (36.6 °C)  --    Temp src Oral  --    Pulse (!) 31  --    Heart Rate Source Monitor  --    Resp 16  --    Pulse Oximetry Type Intermittent  --    Flow (L/min) 3  --    O2 Device (Oxygen Therapy) nasal cannula  --    BP (!) 88/59 (!) 70/0   MAP (mmHg) 69  --    BP Location Left arm Left arm   BP Method Automatic Automatic   Patient Position Lying Lying   PAUL Smith notified.  STAT EKG ordered.  Patient is alert, oriented x 4 and denies chest pain, SOB, lightheadedness, and dizziness.

## 2019-09-15 NOTE — PT/OT/SLP PROGRESS
Physical Therapy Treatment    Patient Name:  Deborah Navas   MRN:  [id number]    Recommendations:     Discharge Recommendations:  home   Discharge Equipment Recommendations: none   Barriers to discharge: None    Assessment:     Deborah Navas is a 48 y.o. female admitted with a medical diagnosis of Acute on chronic combined systolic and diastolic heart failure.  She presents with the following impairments/functional limitations:  weakness, impaired endurance, impaired self care skills, impaired cardiopulmonary response to activity, impaired functional mobilty, decreased lower extremity function. Upon sitting EOB, pt unable to hold self up, stared off into the distance, and became unresponsive. Pt immediately returned to supine by physical therapist. PT called for RN to come into room while PT shouting pt's name and tapping thigh to try to illicit a response. After about 10 seconds, pt responsive and able to move all 4 extremities. PT relayed information to bedside RN, charge RN, and rapid response RNs. NO falls or injury took place.     Rehab Prognosis: Good; patient would benefit from acute skilled PT services to address these deficits and reach maximum level of function.    Recent Surgery: Procedure(s) (LRB):  INSERTION-LEFT VENTRICULAR ASSIST DEVICE (N/A)  INSERTION, GRAFT, PERICARDIUM  CLOSURE, WOUND, STERNUM 5 Days Post-Op    Plan:     During this hospitalization, patient to be seen 6 x/week to address the identified rehab impairments via gait training, therapeutic activities, therapeutic exercises, neuromuscular re-education and progress toward the following goals:    · Plan of Care Expires:  10/10/19    Subjective     Chief Complaint: malaise, dizziness   Patient/Family Comments/goals: to get better and return home   Pain/Comfort:  · Pain Rating 1: 4/10(low back)  · Pain Addressed 1: Reposition, Distraction  · Pain Rating Post-Intervention 1: 4/10      Objective:     Communicated with RN  prior to session and friend present in room.  Patient found HOB elevated with telemetry, pulse ox (continuous), blood pressure cuff, LVAD, chest tube, oxygen, peripheral IV, wound vac upon PT entry to room.     General Precautions: Standard, fall, sternal, LVAD   Orthopedic Precautions:N/A   Braces: N/A     Functional Mobility:  · Bed Mobility:     · Scooting up in bed in supine: total A x 2 persons using drawsheet  · Supine to Sit: minimum assistance   · Cuing for sequencing   · Sit to Supine: total assistance  · Transfers: not performed  · Gait: not performed       AM-PAC 6 CLICK MOBILITY  Turning over in bed (including adjusting bedclothes, sheets and blankets)?: 3  Sitting down on and standing up from a chair with arms (e.g., wheelchair, bedside commode, etc.): 1  Moving from lying on back to sitting on the side of the bed?: 3  Moving to and from a bed to a chair (including a wheelchair)?: 1  Need to walk in hospital room?: 1  Climbing 3-5 steps with a railing?: 1  Basic Mobility Total Score: 10       Therapeutic Activities and Exercises:  Educated pt on PT role/POC  Educated pt on importance of OOB activity  Educated pt on sternal precautions   Pt verbalized understanding     Patient left supine with all lines intact, call button in reach and bedside RN, charge RN, 2 rapid response RNs, and 2 other RNs, present in room present..    GOALS:   Multidisciplinary Problems     Physical Therapy Goals        Problem: Physical Therapy Goal    Goal Priority Disciplines Outcome Goal Variances Interventions   Physical Therapy Goal     PT, PT/OT Ongoing (interventions implemented as appropriate)     Description:  Goals to be met by: 10/1/2019    Patient will increase functional independence with mobility by performin. Supine to sit with Contact Guard Assistance - not met  2. Sit to stand transfer with Supervision - not met  3. Gait  x 200 feet with Supervision  - not met  4. Ascend/descend 2 stair with Contact Guard  Assistance - not met                      Time Tracking:     PT Received On: 09/15/19  PT Start Time: 1017     PT Stop Time: 1034  PT Total Time (min): 17 min     Billable Minutes: Therapeutic Activity 8    Treatment Type: Treatment  PT/PTA: PT           Niurka Posadas PT, DPT  9/15/2019  [phone]

## 2019-09-15 NOTE — SUBJECTIVE & OBJECTIVE
"Interval History: Patient extremely tired as she has been up all night with "twitching" under her left breast. Started out yesterday as just irritating but is becoming painful.     Continuous Infusions:   DOBUTamine 2.5 mcg/kg/min (09/15/19 0541)    furosemide (LASIX) 2 mg/mL infusion (non-titrating) 20 mg/hr (09/15/19 0154)    heparin (porcine) in 5 % dex 400 Units/hr (09/14/19 0512)     Scheduled Meds:   aspirin  325 mg Oral Daily    ondansetron  4 mg Intravenous Once    oxyCODONE  20 mg Oral Q12H    pantoprazole  40 mg Oral Daily    polyethylene glycol  17 g Oral Daily    ramelteon  8 mg Oral QHS    senna-docusate 8.6-50 mg  2 tablet Oral Daily    venlafaxine  150 mg Oral Daily    warfarin  5 mg Oral Daily     PRN Meds:albumin human 5%, albuterol sulfate, bisacodyl, Dextrose 10% Bolus, Dextrose 10% Bolus, fentaNYL, glucagon (human recombinant), glucose, glucose, hydrocortisone, HYDROmorphone, hydrOXYzine HCl, insulin aspart U-100, magnesium hydroxide 400 mg/5 ml, magnesium sulfate IVPB, ondansetron, simethicone, sodium chloride 0.9%, sodium phosphates    Review of patient's allergies indicates:   Allergen Reactions    Adhesive Blisters     Reaction to area in chest and up only    Codeine Itching     Objective:     Vital Signs (Most Recent):  Temp: 97.5 °F (36.4 °C) (09/15/19 0717)  Pulse: (!) 59 (09/15/19 0717)  Resp: 16 (09/15/19 0717)  BP: (!) 88/0 (09/15/19 0717)  SpO2: 95 % (09/15/19 0717) Vital Signs (24h Range):  Temp:  [97.5 °F (36.4 °C)-98.9 °F (37.2 °C)] 97.5 °F (36.4 °C)  Pulse:  [51-74] 59  Resp:  [16-20] 16  SpO2:  [90 %-96 %] 95 %  BP: ()/(0-73) 88/0     Patient Vitals for the past 72 hrs (Last 3 readings):   Weight   09/15/19 0600 105.8 kg (233 lb 4 oz)   09/14/19 0535 109 kg (240 lb 4.8 oz)   09/13/19 0400 115 kg (253 lb 8.5 oz)     Body mass index is 36.53 kg/m².      Intake/Output Summary (Last 24 hours) at 9/15/2019 0957  Last data filed at 9/15/2019 0600  Gross per 24 hour "   Intake 635.58 ml   Output 2220 ml   Net -1584.42 ml       Hemodynamic Parameters:       Physical Exam   Constitutional: She is oriented to person, place, and time. She appears well-developed and well-nourished.   HENT:   Head: Normocephalic and atraumatic.   Neck: JVD (midneck) present.   Cardiovascular: Regular rhythm. Bradycardia present.   vad hum smooth. Skin vac in place to sternal incision.    Pulmonary/Chest: Effort normal and breath sounds normal. No stridor. No respiratory distress.       Abdominal: Soft. Bowel sounds are normal. She exhibits no distension. There is no tenderness.   Musculoskeletal: Normal range of motion. She exhibits no edema.   Neurological: She is alert and oriented to person, place, and time.   Skin: Skin is warm and dry.   Psychiatric: She has a normal mood and affect. Her behavior is normal. Judgment and thought content normal.   Nursing note and vitals reviewed.      Significant Labs:  CBC:  Recent Labs   Lab 09/12/19  0500 09/13/19  0400 09/14/19  0610   WBC 12.74* 10.45 8.00   RBC 2.97* 2.85* 3.10*   HGB 8.5* 8.3* 8.9*   HCT 28.2* 27.1* 28.9*   * 130* 194   MCV 95 95 93   MCH 28.6 29.1 28.7   MCHC 30.1* 30.6* 30.8*     BNP:  Recent Labs   Lab 09/11/19  0400 09/13/19  0400   * 987*     CMP:  Recent Labs   Lab 09/11/19  0400 09/11/19  0822  09/12/19  1338 09/13/19  0400 09/14/19  0610   * 156*  156*   < > 99 98 96   CALCIUM 9.2 9.0  9.0   < > 9.3 9.6 9.7   ALBUMIN 4.1 3.8  --   --  3.6  --    PROT 6.8 6.5  --   --  6.3  --    * 140  140   < > 135* 136 140   K 4.4 4.5  4.5   < > 4.1 3.5 3.8   CO2 25 20*  20*   < > 23 25 33*    104  104   < > 101 98 96   BUN 19 22*  22*   < > 28* 31* 27*   CREATININE 1.5* 1.5*  1.5*   < > 1.9* 1.6* 1.3   ALKPHOS 62 57  --   --  66  --    ALT 29 29  --   --  171*  --    * 102*  --   --  172*  --    BILITOT 1.8* 1.6*  --   --  1.8*  --     < > = values in this interval not displayed.      Coagulation:    Recent Labs   Lab 09/12/19  0500  09/13/19  0400  09/13/19  1833 09/14/19  0005 09/14/19  0610   INR 1.2  --  1.2  --   --   --  1.1   APTT 29.8   < > 33.5*   < > 30.1 28.2 26.9    < > = values in this interval not displayed.     LDH:  Recent Labs   Lab 09/13/19  0400 09/14/19  0610   * 343*     Microbiology:  Microbiology Results (last 7 days)     Procedure Component Value Units Date/Time    Blood culture [[id number]] Collected:  09/11/19 0810    Order Status:  Completed Specimen:  Blood from Peripheral, Antecubital, Left Updated:  09/14/19 1012     Blood Culture, Routine No Growth to date      No Growth to date      No Growth to date      No Growth to date    Blood culture [[id number]] Collected:  09/11/19 0832    Order Status:  Completed Specimen:  Blood from Line, Jugular, Internal Right Updated:  09/14/19 1012     Blood Culture, Routine No Growth to date      No Growth to date      No Growth to date      No Growth to date    HPV High Risk Genotypes, PCR [[id number]] Collected:  09/11/19 1330    Order Status:  No result Updated:  09/12/19 1006    HPV High Risk Genotypes, PCR [[id number]]     Order Status:  Completed     Blood culture [[id number]] Collected:  09/11/19 0820    Order Status:  Sent Specimen:  Blood from Line, Jugular, Internal Left Updated:  09/11/19 0822          I have reviewed all pertinent labs within the past 24 hours.    Estimated Creatinine Clearance: 65.5 mL/min (based on SCr of 1.3 mg/dL).    Diagnostic Results:  I have reviewed and interpreted all pertinent imaging results/findings within the past 24 hours.

## 2019-09-15 NOTE — NURSING
EKG technician at bedside.  EKG reveals wide QRS tachycardia with occasional V-Paced and premature supraventricular complexes with frequent and consecutive premature ventricular complexes.  DELFINO Smith. stated he discussed with EP and HR is not 31 it is artifact, device was interrogated.

## 2019-09-15 NOTE — SUBJECTIVE & OBJECTIVE
Interval History: 48 F with PMH NICM s/p LVAD (HM3 9/10/19), medtronic CRT-D, HLD, remote VF with appropriate shocks consulted to EP for diaphragmatic stimulation with pacing. Patient reports rhythmic contractions of her diaphragm. No recent device shocks, light headedness or dizziness. Reports severe fatigue and shortness of breath prior to admission. Post-op pain well controlled. Reports no further symptoms or complaints at this time.    Review of Systems   All other systems reviewed and are negative.    Objective:     Vital Signs (Most Recent):  Temp: 97.8 °F (36.6 °C) (09/15/19 1115)  Pulse: (!) 31 (09/15/19 1115)  Resp: 16 (09/15/19 1115)  BP: (!) 70/0 (09/15/19 1120)  SpO2: (!) 94 % (09/15/19 1000) Vital Signs (24h Range):  Temp:  [97.5 °F (36.4 °C)-98.9 °F (37.2 °C)] 97.8 °F (36.6 °C)  Pulse:  [31-74] 31  Resp:  [16-20] 16  SpO2:  [90 %-96 %] 94 %  BP: ()/(0-73) 70/0     Weight: 105.8 kg (233 lb 4 oz)  Body mass index is 36.53 kg/m².     SpO2: (!) 94 %  O2 Device (Oxygen Therapy): nasal cannula    Physical Exam   Constitutional: She is oriented to person, place, and time. She appears well-developed and well-nourished. No distress.   HENT:   Head: Normocephalic and atraumatic.   Neck: No JVD present.   Cardiovascular:   VAD heard throughout precordium. S/p sternotomy, chest tube and wound vac in place   Pulmonary/Chest: Effort normal and breath sounds normal. No respiratory distress. She has no wheezes. She has no rales.   Abdominal: Soft. Bowel sounds are normal. She exhibits no distension. There is no tenderness.   Musculoskeletal: She exhibits no edema.   Diaphragmatic contractions which each V-paced beat. Stops when pacing inhibited.    Neurological: She is alert and oriented to person, place, and time.   Skin: She is not diaphoretic.       Significant Labs:     Recent Results (from the past 24 hour(s))   POCT glucose    Collection Time: 09/14/19  2:57 PM   Result Value Ref Range    POCT Glucose 134  (H) 70 - 110 mg/dL   POCT glucose    Collection Time: 09/14/19  4:36 PM   Result Value Ref Range    POCT Glucose 106 70 - 110 mg/dL   POCT glucose    Collection Time: 09/14/19  9:51 PM   Result Value Ref Range    POCT Glucose 125 (H) 70 - 110 mg/dL   Lactic acid, plasma    Collection Time: 09/14/19 11:45 PM   Result Value Ref Range    Lactate (Lactic Acid) 0.9 0.5 - 2.2 mmol/L   POCT glucose    Collection Time: 09/15/19  7:32 AM   Result Value Ref Range    POCT Glucose 135 (H) 70 - 110 mg/dL   CBC auto differential    Collection Time: 09/15/19  9:19 AM   Result Value Ref Range    WBC 7.16 3.90 - 12.70 K/uL    RBC 3.64 (L) 4.00 - 5.40 M/uL    Hemoglobin 10.4 (L) 12.0 - 16.0 g/dL    Hematocrit 33.2 (L) 37.0 - 48.5 %    Mean Corpuscular Volume 91 82 - 98 fL    Mean Corpuscular Hemoglobin 28.6 27.0 - 31.0 pg    Mean Corpuscular Hemoglobin Conc 31.3 (L) 32.0 - 36.0 g/dL    RDW 14.6 (H) 11.5 - 14.5 %    Platelets 254 150 - 350 K/uL    MPV 9.9 9.2 - 12.9 fL    Immature Granulocytes 0.7 (H) 0.0 - 0.5 %    Gran # (ANC) 5.5 1.8 - 7.7 K/uL    Immature Grans (Abs) 0.05 (H) 0.00 - 0.04 K/uL    Lymph # 0.6 (L) 1.0 - 4.8 K/uL    Mono # 0.8 0.3 - 1.0 K/uL    Eos # 0.2 0.0 - 0.5 K/uL    Baso # 0.02 0.00 - 0.20 K/uL    nRBC 1 (A) 0 /100 WBC    Gran% 77.1 (H) 38.0 - 73.0 %    Lymph% 8.8 (L) 18.0 - 48.0 %    Mono% 10.9 4.0 - 15.0 %    Eosinophil% 2.2 0.0 - 8.0 %    Basophil% 0.3 0.0 - 1.9 %    Platelet Estimate Appears normal     Aniso Slight     Poik Slight     Poly Occasional     Hypo Occasional     Ovalocytes Occasional     Tear Drop Cells Occasional     [place] Cells Occasional     Basophilic Stippling Occasional     Differential Method Automated    Basic metabolic panel    Collection Time: 09/15/19  9:19 AM   Result Value Ref Range    Sodium 141 136 - 145 mmol/L    Potassium 2.9 (L) 3.5 - 5.1 mmol/L    Chloride 94 (L) 95 - 110 mmol/L    CO2 33 (H) 23 - 29 mmol/L    Glucose 129 (H) 70 - 110 mg/dL    BUN, Bld 29 (H) 6 - 20 mg/dL     Creatinine 1.3 0.5 - 1.4 mg/dL    Calcium 9.9 8.7 - 10.5 mg/dL    Anion Gap 14 8 - 16 mmol/L    eGFR if African American 55.7 (A) >60 mL/min/1.73 m^2    eGFR if non  48.3 (A) >60 mL/min/1.73 m^2   Protime-INR    Collection Time: 09/15/19  9:19 AM   Result Value Ref Range    Prothrombin Time 15.1 (H) 9.0 - 12.5 sec    INR 1.5 (H) 0.8 - 1.2   Lactate dehydrogenase    Collection Time: 09/15/19  9:19 AM   Result Value Ref Range     (H) 110 - 260 U/L   APTT    Collection Time: 09/15/19  9:19 AM   Result Value Ref Range    aPTT 29.2 21.0 - 32.0 sec   Magnesium    Collection Time: 09/15/19  9:19 AM   Result Value Ref Range    Magnesium 2.1 1.6 - 2.6 mg/dL   POCT glucose    Collection Time: 09/15/19 10:26 AM   Result Value Ref Range    POCT Glucose 121 (H) 70 - 110 mg/dL   POCT glucose    Collection Time: 09/15/19 11:13 AM   Result Value Ref Range    POCT Glucose 148 (H) 70 - 110 mg/dL         Significant Imaging:     I have reviewed all pertinent imaging studies from the last 24 hours

## 2019-09-15 NOTE — NURSING
"Patient was working with PT at 1030 hours.  Therapist repositioned patient to edge of bed.  Patient stated, "I am going down," and was unconscious for a brief time ( a few seconds per Flavia, patient's family).  Patient repositioned supine with HOB 30 degrees.  Johnny, Charge RN, Called Rapid team @ [id number].  Rapid arrived to bedside at 1035 hours. Paged, messaged, and called CAROLINA Cruz PA-C.  Bedside called ANNE MARIE Chau M.D., who called EP.  Luis arrived to bedside interrogated device.  PAUL Quinteros (EP) at bedside with team.  H. HR dropped to 30's.  VSS.  Labs were drawn very late today.  K+ = 2.9, resulted at 0919 hours, 60 mg oral ordered and given. PAUL Lua notified.  BMP ordered for 1400 hours.  "

## 2019-09-15 NOTE — NURSING
"VAD coordinator on call paged by YURI Marcelo RN that patient continues to have pulsations in abdomen. Patient having "sense of impending doom" and is not herself. VS are stable.     Discussed with Dr. Lua, who will have Dr. Moreno assess patient.   "

## 2019-09-15 NOTE — PROGRESS NOTES
"[facility]  Heart Transplant  Progress Note    Patient Name: Deborah Navas  MRN: [id number]  Admission Date: 9/4/2019  Hospital Length of Stay: 11 days  Attending Physician: Jolene Lua MD  Primary Care Provider: Primary Doctor No  Principal Problem:Acute on chronic combined systolic and diastolic heart failure    Subjective:     Interval History: Patient extremely tired as she has been up all night with "twitching" under her left breast. Started out yesterday as just irritating but is becoming painful. CT done overnight to rule out AAA, which was negative. Chest tube output 120 mL over last 24 hours.     Continuous Infusions:   DOBUTamine 2.5 mcg/kg/min (09/15/19 0541)    furosemide (LASIX) 2 mg/mL infusion (non-titrating) 20 mg/hr (09/15/19 0154)    heparin (porcine) in 5 % dex 400 Units/hr (09/14/19 0512)     Scheduled Meds:   aspirin  325 mg Oral Daily    ondansetron  4 mg Intravenous Once    oxyCODONE  20 mg Oral Q12H    pantoprazole  40 mg Oral Daily    polyethylene glycol  17 g Oral Daily    ramelteon  8 mg Oral QHS    senna-docusate 8.6-50 mg  2 tablet Oral Daily    venlafaxine  150 mg Oral Daily    warfarin  5 mg Oral Daily     PRN Meds:albumin human 5%, albuterol sulfate, bisacodyl, Dextrose 10% Bolus, Dextrose 10% Bolus, fentaNYL, glucagon (human recombinant), glucose, glucose, hydrocortisone, HYDROmorphone, hydrOXYzine HCl, insulin aspart U-100, magnesium hydroxide 400 mg/5 ml, magnesium sulfate IVPB, ondansetron, simethicone, sodium chloride 0.9%, sodium phosphates    Review of patient's allergies indicates:   Allergen Reactions    Adhesive Blisters     Reaction to area in chest and up only    Codeine Itching     Objective:     Vital Signs (Most Recent):  Temp: 97.5 °F (36.4 °C) (09/15/19 0717)  Pulse: (!) 59 (09/15/19 0717)  Resp: 16 (09/15/19 0717)  BP: (!) 88/0 (09/15/19 0717)  SpO2: 95 % (09/15/19 0717) Vital Signs (24h Range):  Temp:  [97.5 °F (36.4 " °C)-98.9 °F (37.2 °C)] 97.5 °F (36.4 °C)  Pulse:  [51-74] 59  Resp:  [16-20] 16  SpO2:  [90 %-96 %] 95 %  BP: ()/(0-73) 88/0     Patient Vitals for the past 72 hrs (Last 3 readings):   Weight   09/15/19 0600 105.8 kg (233 lb 4 oz)   09/14/19 0535 109 kg (240 lb 4.8 oz)   09/13/19 0400 115 kg (253 lb 8.5 oz)     Body mass index is 36.53 kg/m².      Intake/Output Summary (Last 24 hours) at 9/15/2019 0957  Last data filed at 9/15/2019 0600  Gross per 24 hour   Intake 635.58 ml   Output 2220 ml   Net -1584.42 ml       Hemodynamic Parameters:       Physical Exam   Constitutional: She is oriented to person, place, and time. She appears well-developed and well-nourished.   HENT:   Head: Normocephalic and atraumatic.   Neck: JVD (midneck) present.   Cardiovascular: Regular rhythm. Bradycardia present.   vad hum smooth. Skin vac in place to sternal incision.    Pulmonary/Chest: Effort normal and breath sounds normal. No stridor. No respiratory distress.       Abdominal: Soft. Bowel sounds are normal. She exhibits no distension. There is no tenderness.   Musculoskeletal: Normal range of motion. She exhibits no edema.   Neurological: She is alert and oriented to person, place, and time.   Skin: Skin is warm and dry.   Psychiatric: She has a normal mood and affect. Her behavior is normal. Judgment and thought content normal.   Nursing note and vitals reviewed.      Significant Labs:  CBC:  Recent Labs   Lab 09/12/19  0500 09/13/19  0400 09/14/19  0610   WBC 12.74* 10.45 8.00   RBC 2.97* 2.85* 3.10*   HGB 8.5* 8.3* 8.9*   HCT 28.2* 27.1* 28.9*   * 130* 194   MCV 95 95 93   MCH 28.6 29.1 28.7   MCHC 30.1* 30.6* 30.8*     BNP:  Recent Labs   Lab 09/11/19  0400 09/13/19  0400   * 987*     CMP:  Recent Labs   Lab 09/11/19  0400 09/11/19  0822  09/12/19  1338 09/13/19  0400 09/14/19  0610   * 156*  156*   < > 99 98 96   CALCIUM 9.2 9.0  9.0   < > 9.3 9.6 9.7   ALBUMIN 4.1 3.8  --   --  3.6  --    PROT 6.8  6.5  --   --  6.3  --    * 140  140   < > 135* 136 140   K 4.4 4.5  4.5   < > 4.1 3.5 3.8   CO2 25 20*  20*   < > 23 25 33*    104  104   < > 101 98 96   BUN 19 22*  22*   < > 28* 31* 27*   CREATININE 1.5* 1.5*  1.5*   < > 1.9* 1.6* 1.3   ALKPHOS 62 57  --   --  66  --    ALT 29 29  --   --  171*  --    * 102*  --   --  172*  --    BILITOT 1.8* 1.6*  --   --  1.8*  --     < > = values in this interval not displayed.      Coagulation:   Recent Labs   Lab 09/12/19  0500  09/13/19  0400  09/13/19  1833 09/14/19  0005 09/14/19  0610   INR 1.2  --  1.2  --   --   --  1.1   APTT 29.8   < > 33.5*   < > 30.1 28.2 26.9    < > = values in this interval not displayed.     LDH:  Recent Labs   Lab 09/13/19  0400 09/14/19  0610   * 343*     Microbiology:  Microbiology Results (last 7 days)     Procedure Component Value Units Date/Time    Blood culture [[id number]] Collected:  09/11/19 0810    Order Status:  Completed Specimen:  Blood from Peripheral, Antecubital, Left Updated:  09/14/19 1012     Blood Culture, Routine No Growth to date      No Growth to date      No Growth to date      No Growth to date    Blood culture [[id number]] Collected:  09/11/19 0832    Order Status:  Completed Specimen:  Blood from Line, Jugular, Internal Right Updated:  09/14/19 1012     Blood Culture, Routine No Growth to date      No Growth to date      No Growth to date      No Growth to date    HPV High Risk Genotypes, PCR [[id number]] Collected:  09/11/19 1330    Order Status:  No result Updated:  09/12/19 1006    HPV High Risk Genotypes, PCR [[id number]]     Order Status:  Completed     Blood culture [[id number]] Collected:  09/11/19 0820    Order Status:  Sent Specimen:  Blood from Line, Jugular, Internal Left Updated:  09/11/19 0822          I have reviewed all pertinent labs within the past 24 hours.    Estimated Creatinine Clearance: 65.5 mL/min (based on SCr of 1.3 mg/dL).    Diagnostic Results:  I have reviewed and  interpreted all pertinent imaging results/findings within the past 24 hours.    Assessment and Plan:     No notes on file    * Acute on chronic combined systolic and diastolic heart failure  NICM EF 20% s/p LVAD 9/10  RHC: done on admit 9/4/19 RA: 20/ 20/ 18 RV: 60/ 8/ 18 PA: 60/ 32/ 45 PWP: 43/ 70/ 40 . Cardiac output was 2.27 by Fitz. Cardiac index is 1.04 L/min/m2. O2 Sat: PA 34%. AO 95% PVR 2.2 PALMER    -see s/p LVAD      LVAD (left ventricular assist device) present  -HeartMate 3 Implanted 9/10/19 as DT  -HTS Primary  -Implanted by Dr. Walden  -Continue Coumadin, Goal INR 2.0-3.0. Subtherapeutic today. Continue heparin bridge  -Antiplatelets  mg  -LDH is stable overall today. Will continue to monitor daily.  -Speed set at 5300, LSL 4900 rpm  -Interrogation notable for PI events  -Not listed for OHTx  -Continue  @ 2.5 mcg/kg/min  -Will need 2D echo when pulsation/pain under left chest wall subsides  -Continue lasix gtt  -Continue Pt/OT/VAD education    Procedure: Device Interrogation Including analysis of device parameters  Current Settings: Ventricular Assist Device  Review of device function is stable/unstable stable    TXP LVAD INTERROGATIONS 9/15/2019 9/15/2019 9/15/2019 9/14/2019 9/14/2019 9/14/2019 9/14/2019   Type HeartMate3 HeartMate3 HeartMate3 HeartMate3 HeartMate3 HeartMate3 HeartMate3   Flow 4.0 58 4.0 3.9 4.2 4.1 4.0   Speed 5300 5300 5300 5300 5300 5300 5300   PI 4.5 4.6 3.8 3.9 3.2 3.7 4.1   Power (Orellana) 3.7 3.7 3.7 3.7 3.6 3.7 3.7   LSL 4900 - - 4900 4900 4900 4900   Pulsatility Intermittent pulse No Pulse Pulse Intermittent pulse Pulse Pulse Intermittent pulse       Cardiac defibrillator in situ  -Medtronic CRT-D  -Appears patient HR dropped to lower limit of 60 bpm and has been ventricular-paced over last few days  -Will hold amiodarone this morning. Concerned that her ventricular pacing may be causing phrenic nerve irritation which is the cause of the pulsation/twitching that is  causing her discomfort. EP consulted to adjust device settings     CKD (chronic kidney disease)  Baseline cr around 1.3-1.4 in early 2019  - Cr at baseline today   - Renally dose meds from CrCl 45-30  - Avoid nephrotoxic agents    Abnormal CT scan  -focal opacity found at right base.  DDx: atelectasis.  Recommending repeat CT in 3 months    Enlarged thyroid  Ultrasound done and showed enlarged multinodular thyroid which warrants surveillance in one year.   - TSH and fT4 wnl    History of ventricular tachycardia  -In the setting of hypokalemia  -Monitor electrolytes closely    Ventricular tachyarrhythmia  - On amio at home, hold for now (see above)   - Goal K >4 and Mg >2  - Monitor on telemetry        Adry Cruz PA-C  Heart Transplant  [facility]

## 2019-09-15 NOTE — ASSESSMENT & PLAN NOTE
-HeartMate 3 Implanted 9/10/19 as DT  -HTS Primary  -Implanted by Dr. Walden  -Continue Coumadin, Goal INR 2.0-3.0. Subtherapeutic today. Continue heparin bridge  -Antiplatelets  mg  -LDH is stable overall today. Will continue to monitor daily.  -Speed set at 5300, LSL 4900 rpm  -Interrogation notable for PI events  -Not listed for OHTx  -Continue  @ 2.5 mcg/kg/min  -Will need 2D echo when pulsation/pain under left chest wall subsides  -Continue lasix gtt  -Continue Pt/OT/VAD education    Procedure: Device Interrogation Including analysis of device parameters  Current Settings: Ventricular Assist Device  Review of device function is stable/unstable stable    TXP LVAD INTERROGATIONS 9/15/2019 9/15/2019 9/15/2019 9/14/2019 9/14/2019 9/14/2019 9/14/2019   Type HeartMate3 HeartMate3 HeartMate3 HeartMate3 HeartMate3 HeartMate3 HeartMate3   Flow 4.0 58 4.0 3.9 4.2 4.1 4.0   Speed 5300 5300 5300 5300 5300 5300 5300   PI 4.5 4.6 3.8 3.9 3.2 3.7 4.1   Power (Orellana) 3.7 3.7 3.7 3.7 3.6 3.7 3.7   LSL 4900 - - 4900 4900 4900 4900   Pulsatility Intermittent pulse No Pulse Pulse Intermittent pulse Pulse Pulse Intermittent pulse

## 2019-09-15 NOTE — CARE UPDATE
"RAPID RESPONSE NURSE NOTE     Admit Date: 2019  LOS: 11  Code Status: Full Code   Date of Consult: 09/15/2019  : 1970  Age: 48 y.o.  Weight:   Wt Readings from Last 1 Encounters:   09/15/19 105.8 kg (233 lb 4 oz)     Sex: female  Race: White   Bed[place] [address] [address] A:   MRN: [id number]  Time Rapid Response Team page Received: not paged   Time Rapid Response Team at Bedside: 1029  Time Rapid Response Team left Bedside: 1051  Was the patient discharged from an ICU this admission?   yes  Was the patient discharged from a PACU within last 24 hours?  no  Did the patient receive conscious sedation/general anesthesia within last 24 hours?  no  Was the patient in the ED within the past 24 hours?  no  Was the patient started on NIPPV within the past 24 hours?  no  Did this progress into an ARC or CPA:  no  Attending Physician: Jolene Lua MD  Primary Service: Networked reference to record PCT   Consult Requested By: Jolene Lua MD     SITUATION     Reason for Call: syncope  Called to evaluate the patient for Circulatory    BACKGROUND     Why is the patient in the hospital?: Acute on chronic combined systolic and diastolic heart failure    Patient has a past medical history of Fractures, History of ventricular fibrillation, History of ventricular tachycardia, Hyperlipidemia, Migraine, and Osteoarthritis.    ASSESSMENT/INTERVENTIONS     BP (!) 80/0 (BP Location: Left arm, Patient Position: Lying)   Pulse (!) 36   Temp 98.4 °F (36.9 °C) (Oral)   Resp 16   Ht 5' 7" (1.702 m)   Wt 105.8 kg (233 lb 4 oz)   LMP  (Within Years)   SpO2 (!) 94%   Breastfeeding? No   BMI 36.53 kg/m²     What did you find: Received call from nurse to evaluate patient who had syncopal event during PT. Patient was transitioning from lying down to edge of bed when she "passed out." She states she can "feel it coming on." No VAD alarms during event. Review of telemetry showed HR dropped to 36-38, paced rhythm. Doppler post " event was 80/0.     Dr. Smith and Dr. Carmona at bedside. PPM interrogated. Labs reviewed, K 2.9, replacements ordered.     Plan to replace K and obtain orthostatics.      RECOMMENDATIONS     We recommend: orthostatics     FOLLOW-UP/CONTINGENCY PLAN     Patient needs a second visit at : this afternoon     Call the Rapid Response Nurse, Rhonda Gomez RN at x [phone] for additional questions or concerns.    PHYSICIAN ESCALATION     Orders received and case discussed with Dr. Smith.    Disposition: Remain in room 3073 .

## 2019-09-15 NOTE — NURSING
TATIANA Gaines RN paged VAD coordinator on call with patient update. VAD coordinator appreciates call and remains available.

## 2019-09-15 NOTE — ASSESSMENT & PLAN NOTE
NICM EF 20% s/p LVAD 9/10  RHC: done on admit 9/4/19 RA: 20/ 20/ 18 RV: 60/ 8/ 18 PA: 60/ 32/ 45 PWP: 43/ 70/ 40 . Cardiac output was 2.27 by Fitz. Cardiac index is 1.04 L/min/m2. O2 Sat: PA 34%. AO 95% PVR 2.2 PALMER    -see s/p LVAD

## 2019-09-15 NOTE — CARE UPDATE
"RAPID RESPONSE NURSE PROACTIVE ROUNDING NOTE     Time of Visit: 1020    Admit Date: 2019  LOS: 11  Code Status: Full Code   Date of Visit: 09/15/2019  : 1970  Age: 48 y.o.  Sex: female  Race: White  Bed: [facility] 3073/[facility] 30[facility] A:   MRN: [id number]  Was the patient discharged from an ICU this admission? yes   Was the patient discharged from a PACU within last 24 hours?  no  Did the patient receive conscious sedation/general anesthesia in last 24 hours?  no  Was the patient in the ED within the past 24 hours?  no  Was the patient started on NIPPV within the past 24 hours?  no  Attending Physician: Jolene Lua MD  Primary Service: Networked reference to record PCT     ASSESSMENT     Diagnosis: Acute on chronic combined systolic and diastolic heart failure    Abnormal Vital Signs: BP (!) 70/0 (BP Location: Left arm, Patient Position: Lying)   Pulse (!) 46   Temp 97.8 °F (36.6 °C) (Oral)   Resp 16   Ht 5' 7" (1.702 m)   Wt 105.8 kg (233 lb 4 oz)   LMP  (Within Years)   SpO2 (!) 94%   Breastfeeding? No   BMI 36.53 kg/m²      Clinical Issues: Circulatory    Patient  has a past medical history of Fractures, History of ventricular fibrillation, History of ventricular tachycardia, Hyperlipidemia, Migraine, and Osteoarthritis.    Called to evaluate pt for loss of consciousness while working with therapy.      INTERVENTIONS/ RECOMMENDATIONS     Pt awake/alert. Diaphoretic. States she sat on edge of bed with therapy & loss consciousness shortly after. Primary RN states she was unresponsive for ~10 seconds. Primary RN also states pt's HR decreased to 36.     Doppler pressure of 80/0. Cardiology notified and to bedside. Pacemaker interrogated. States no changes need to be made to device.     VSS at this time. HR 65. NAD noted. Pt awake/alert. Follows commands appropriately. No focal deficits noted.     Monitor VS. Notify primary team of any changes in pt's condition.     Discussed plan of care with RN, " Josh.    PHYSICIAN ESCALATION     Yes/No  yes    Orders received and case discussed with Dr. Carmona.    Disposition: Remain in room 3073 A.    FOLLOW-UP     Call back the Rapid Response Nurse, Juana Martin RN at [phone] for additional questions or concerns.

## 2019-09-15 NOTE — CARE UPDATE
BG goal 140 - 180. BG below goal without insulin. PO intake remains minimal.     BG monitoring Ac/hs and low dose correction scale.       ** Please call Endocrine for any BG related issues **

## 2019-09-15 NOTE — PROGRESS NOTES
09/15/2019  Joaquin Cobb    Current provider:  Jolene Lua MD      I, Joaquin Cobb, rounded on Deborah Navas to ensure all mechanical assist device settings (IABP or VAD) were appropriate and all parameters were within limits.  I was able to ensure all back up equipment was present, the staff had no issues, and the Perfusion Department daily rounding was complete.    3:36 PM

## 2019-09-15 NOTE — PROGRESS NOTES
"Notified by Dr. Moreno pt needs an abd US now. Called ultra sound they cannot see order, stating it needs to have "US" infront for them to complete order. Order has "CV" notified Dr. Moreno of what US stated. Will continue to monitor.   "

## 2019-09-15 NOTE — PROGRESS NOTES
Assumed care of pt at 0000; Notified Dr. Moreno pt states she has not urinated since 1100 9/14, asked pt to urinate and she urinated 250ml. Pt also noted that she only had sips of water with meds all day. MD ordered bladder order.  Also noted pt's Meds CT was not to suction; abd area that was pulsating was not pulsating as before. Then placed pt to wall suction and some pulsation noted. VAD numbers are WDL. Notified Dr. Moreno and MD will consult CT. MD also ordered 80 mg IVP lasix and increase of lasix gtt to 20 mg/hr and if urine output is not sufficient at am, call MD and get stat labs.  No further orders at this time. Will continue to monitor closely.

## 2019-09-15 NOTE — ASSESSMENT & PLAN NOTE
With diaphragmatic stimulation with V-pacing  Resolved with switching LV 1-2 pacing with LV 4-3 pacing  Mode switched to VVIR    Of note, patient became light headed and experienced brief syncope for a few seconds after sitting up with OT, no VAD alarms, no events on ICD interrogation. HR read 36 on telemetry. We repeated this maneuver with the ICD  running, the patient experienced the same symptoms and had low heart rates in the 30's and 40's while the ICD EGM revealed a stable rate at 60. These low heart rates on telemetry appear to be artifact due to LVAD interference.

## 2019-09-15 NOTE — CARE UPDATE
"RAPID RESPONSE NURSE PROACTIVE ROUNDING NOTE     Time of Visit: 1950    Admit Date: 2019  LOS: 10  Code Status: Full Code   Date of Visit: 2019  : 1970  Age: 48 y.o.  Sex: female  Race: White  Bed: [place] [address]/[facility] 3073 A:   MRN: [id number]  Was the patient discharged from an ICU this admission? yes   Was the patient discharged from a PACU within last 24 hours?  no  Did the patient receive conscious sedation/general anesthesia in last 24 hours?  no  Was the patient in the ED within the past 24 hours?  no  Was the patient started on NIPPV within the past 24 hours?  no  Attending Physician: Jolene Lua MD  Primary Service: Networked reference to record PCT     ASSESSMENT     Diagnosis: Acute on chronic combined systolic and diastolic heart failure    Abnormal Vital Signs: BP (!) 90/0 (BP Location: Right arm, Patient Position: Lying)   Pulse 74   Temp 98 °F (36.7 °C) (Oral)   Resp 20   Ht 5' 7" (1.702 m)   Wt 109 kg (240 lb 4.8 oz)   LMP  (Within Years)   SpO2 96%   Breastfeeding? No   BMI 37.64 kg/m²      Clinical Issues: Circulatory    Patient  has a past medical history of Fractures, History of ventricular fibrillation, History of ventricular tachycardia, Hyperlipidemia, Migraine, and Osteoarthritis.    FU from GA earlier. Was asked by bedside RN to assess patient. AAOx4, mild mannered upon assessment. Doppler 92/0. Pulsatile left abdomen present - patient states it is uncomfortable. Denies SOB/HA/CP.      INTERVENTIONS/ RECOMMENDATIONS     Monitor VS. Encourage ambulation. Patient has not had BM since prior to LVAD implantation.     Discussed plan of care with RNKala.    PHYSICIAN ESCALATION     Yes/No  no    Orders received and case discussed with NA.    Disposition: Remain in room 3073.    FOLLOW-UP     Call back the Rapid Response Nurse, Deysi Talbot RN at [phone] for additional questions or concerns.        "

## 2019-09-15 NOTE — PLAN OF CARE
Interval update:     Nursing called patient bradycardic on telemetry HR 30s, while working with PT became unresponsive for brief period. Patient seen and evaluated by me, noted to be at baseline, neurologically intact, MAP 90. Nursing / patient deny any head trauma, patient alert / oriented x 3.     Medtronic ppm interrogated at bedside, DDD HR 60. Discussed with EP team, HR 30 on telemetry likely artifactual with VAD.   Patient orthostatic on lasix ggt. Discussed with Dr. Lua will hold further lasix for now. Resume in AM.       Maia Smith M.D.  Page # [phone]  Cardiovascular Fellow PGY-V  [facility]

## 2019-09-15 NOTE — PROGRESS NOTES
Urine output this far this shift has been 1050 ml, post lasix IVP,  increase in lasix gtt, and parrish insertion.

## 2019-09-15 NOTE — NURSING
Patient's HR dropped to 30's. PAUL Smith notified.  Ordered STAT EKG.  EKG technician notified. Patient is alert, oriented x 4. Mary Cabrera, LVAD Coordinator notified.

## 2019-09-16 PROBLEM — J90 PLEURAL EFFUSION: Status: ACTIVE | Noted: 2019-09-16

## 2019-09-16 LAB
ALBUMIN SERPL BCP-MCNC: 3.3 G/DL (ref 3.5–5.2)
ALBUMIN SERPL BCP-MCNC: 3.4 G/DL (ref 3.5–5.2)
ALP SERPL-CCNC: 63 U/L (ref 55–135)
ALP SERPL-CCNC: 65 U/L (ref 55–135)
ALT SERPL W/O P-5'-P-CCNC: 102 U/L (ref 10–44)
ALT SERPL W/O P-5'-P-CCNC: 104 U/L (ref 10–44)
ANION GAP SERPL CALC-SCNC: 12 MMOL/L (ref 8–16)
ANION GAP SERPL CALC-SCNC: 14 MMOL/L (ref 8–16)
ANISOCYTOSIS BLD QL SMEAR: SLIGHT
APTT BLDCRRT: 24.8 SEC (ref 21–32)
APTT BLDCRRT: 33.8 SEC (ref 21–32)
AST SERPL-CCNC: 45 U/L (ref 10–40)
AST SERPL-CCNC: 46 U/L (ref 10–40)
BACTERIA BLD CULT: NORMAL
BACTERIA BLD CULT: NORMAL
BASOPHILS # BLD AUTO: ABNORMAL K/UL (ref 0–0.2)
BASOPHILS NFR BLD: 0 % (ref 0–1.9)
BILIRUB DIRECT SERPL-MCNC: 0.5 MG/DL (ref 0.1–0.3)
BILIRUB SERPL-MCNC: 0.7 MG/DL (ref 0.1–1)
BILIRUB SERPL-MCNC: 0.7 MG/DL (ref 0.1–1)
BILIRUB UR QL STRIP: NEGATIVE
BNP SERPL-MCNC: 522 PG/ML (ref 0–99)
BUN SERPL-MCNC: 30 MG/DL (ref 6–20)
BUN SERPL-MCNC: 31 MG/DL (ref 6–20)
CALCIUM SERPL-MCNC: 10 MG/DL (ref 8.7–10.5)
CALCIUM SERPL-MCNC: 10.2 MG/DL (ref 8.7–10.5)
CHLORIDE SERPL-SCNC: 96 MMOL/L (ref 95–110)
CHLORIDE SERPL-SCNC: 97 MMOL/L (ref 95–110)
CLARITY UR REFRACT.AUTO: CLEAR
CO2 SERPL-SCNC: 28 MMOL/L (ref 23–29)
CO2 SERPL-SCNC: 30 MMOL/L (ref 23–29)
COLOR UR AUTO: NORMAL
CREAT SERPL-MCNC: 1.3 MG/DL (ref 0.5–1.4)
CREAT SERPL-MCNC: 1.4 MG/DL (ref 0.5–1.4)
CRP SERPL-MCNC: 171.4 MG/L (ref 0–8.2)
DIFFERENTIAL METHOD: ABNORMAL
EOSINOPHIL # BLD AUTO: ABNORMAL K/UL (ref 0–0.5)
EOSINOPHIL NFR BLD: 3 % (ref 0–8)
ERYTHROCYTE [DISTWIDTH] IN BLOOD BY AUTOMATED COUNT: 14.7 % (ref 11.5–14.5)
EST. GFR  (AFRICAN AMERICAN): 50.9 ML/MIN/1.73 M^2
EST. GFR  (AFRICAN AMERICAN): 55.7 ML/MIN/1.73 M^2
EST. GFR  (NON AFRICAN AMERICAN): 44.1 ML/MIN/1.73 M^2
EST. GFR  (NON AFRICAN AMERICAN): 48.3 ML/MIN/1.73 M^2
GLUCOSE SERPL-MCNC: 121 MG/DL (ref 70–110)
GLUCOSE SERPL-MCNC: 148 MG/DL (ref 70–110)
GLUCOSE UR QL STRIP: NEGATIVE
HCT VFR BLD AUTO: 32.9 % (ref 37–48.5)
HGB BLD-MCNC: 10.7 G/DL (ref 12–16)
HGB UR QL STRIP: NEGATIVE
HPV HR 12 DNA CVX QL NAA+PROBE: NEGATIVE
HPV16 AG SPEC QL: NEGATIVE
HPV18 DNA SPEC QL NAA+PROBE: NEGATIVE
HYPOCHROMIA BLD QL SMEAR: ABNORMAL
IMM GRANULOCYTES # BLD AUTO: ABNORMAL K/UL (ref 0–0.04)
IMM GRANULOCYTES NFR BLD AUTO: ABNORMAL % (ref 0–0.5)
INR PPP: 2.1 (ref 0.8–1.2)
KETONES UR QL STRIP: NEGATIVE
LACTATE SERPL-SCNC: 1.9 MMOL/L (ref 0.5–2.2)
LDH SERPL L TO P-CCNC: 351 U/L (ref 110–260)
LEUKOCYTE ESTERASE UR QL STRIP: NEGATIVE
LIPASE SERPL-CCNC: 69 U/L (ref 4–60)
LIPASE SERPL-CCNC: 89 U/L (ref 4–60)
LYMPHOCYTES # BLD AUTO: ABNORMAL K/UL (ref 1–4.8)
LYMPHOCYTES NFR BLD: 13 % (ref 18–48)
MAGNESIUM SERPL-MCNC: 2.1 MG/DL (ref 1.6–2.6)
MCH RBC QN AUTO: 29.2 PG (ref 27–31)
MCHC RBC AUTO-ENTMCNC: 32.5 G/DL (ref 32–36)
MCV RBC AUTO: 90 FL (ref 82–98)
MONOCYTES # BLD AUTO: ABNORMAL K/UL (ref 0.3–1)
MONOCYTES NFR BLD: 10 % (ref 4–15)
NEUTROPHILS NFR BLD: 71 % (ref 38–73)
NEUTS BAND NFR BLD MANUAL: 3 %
NITRITE UR QL STRIP: NEGATIVE
NRBC BLD-RTO: 2 /100 WBC
OVALOCYTES BLD QL SMEAR: ABNORMAL
PH UR STRIP: 7 [PH] (ref 5–8)
PLATELET # BLD AUTO: 296 K/UL (ref 150–350)
PLATELET BLD QL SMEAR: ABNORMAL
PMV BLD AUTO: 9.6 FL (ref 9.2–12.9)
POCT GLUCOSE: 127 MG/DL (ref 70–110)
POCT GLUCOSE: 141 MG/DL (ref 70–110)
POCT GLUCOSE: 144 MG/DL (ref 70–110)
POCT GLUCOSE: 146 MG/DL (ref 70–110)
POCT GLUCOSE: 161 MG/DL (ref 70–110)
POCT GLUCOSE: 175 MG/DL (ref 70–110)
POIKILOCYTOSIS BLD QL SMEAR: SLIGHT
POLYCHROMASIA BLD QL SMEAR: ABNORMAL
POTASSIUM SERPL-SCNC: 3.8 MMOL/L (ref 3.5–5.1)
POTASSIUM SERPL-SCNC: 3.9 MMOL/L (ref 3.5–5.1)
PROT SERPL-MCNC: 6.9 G/DL (ref 6–8.4)
PROT SERPL-MCNC: 6.9 G/DL (ref 6–8.4)
PROT UR QL STRIP: NEGATIVE
PROTHROMBIN TIME: 20.5 SEC (ref 9–12.5)
RBC # BLD AUTO: 3.66 M/UL (ref 4–5.4)
SODIUM SERPL-SCNC: 138 MMOL/L (ref 136–145)
SODIUM SERPL-SCNC: 139 MMOL/L (ref 136–145)
SP GR UR STRIP: 1 (ref 1–1.03)
TARGETS BLD QL SMEAR: ABNORMAL
URN SPEC COLLECT METH UR: NORMAL
VWF MULTIMERS PPP QL: NORMAL
WBC # BLD AUTO: 7.32 K/UL (ref 3.9–12.7)

## 2019-09-16 PROCEDURE — 25000003 PHARM REV CODE 250: Mod: NTX | Performed by: INTERNAL MEDICINE

## 2019-09-16 PROCEDURE — 93005 ELECTROCARDIOGRAM TRACING: CPT | Mod: NTX

## 2019-09-16 PROCEDURE — 83615 LACTATE (LD) (LDH) ENZYME: CPT | Mod: NTX

## 2019-09-16 PROCEDURE — 63600175 PHARM REV CODE 636 W HCPCS: Mod: NTX | Performed by: INTERNAL MEDICINE

## 2019-09-16 PROCEDURE — 97530 THERAPEUTIC ACTIVITIES: CPT | Mod: NTX

## 2019-09-16 PROCEDURE — 85730 THROMBOPLASTIN TIME PARTIAL: CPT | Mod: NTX

## 2019-09-16 PROCEDURE — 83735 ASSAY OF MAGNESIUM: CPT | Mod: NTX

## 2019-09-16 PROCEDURE — 27000248 HC VAD-ADDITIONAL DAY: Mod: NTX

## 2019-09-16 PROCEDURE — 99233 PR SUBSEQUENT HOSPITAL CARE,LEVL III: ICD-10-PCS | Mod: NTX,,, | Performed by: INTERNAL MEDICINE

## 2019-09-16 PROCEDURE — 85730 THROMBOPLASTIN TIME PARTIAL: CPT | Mod: 91,NTX

## 2019-09-16 PROCEDURE — 25000003 PHARM REV CODE 250: Mod: NTX | Performed by: STUDENT IN AN ORGANIZED HEALTH CARE EDUCATION/TRAINING PROGRAM

## 2019-09-16 PROCEDURE — 83880 ASSAY OF NATRIURETIC PEPTIDE: CPT | Mod: NTX

## 2019-09-16 PROCEDURE — 80053 COMPREHEN METABOLIC PANEL: CPT | Mod: NTX

## 2019-09-16 PROCEDURE — 27000221 HC OXYGEN, UP TO 24 HOURS: Mod: NTX

## 2019-09-16 PROCEDURE — 83690 ASSAY OF LIPASE: CPT | Mod: NTX

## 2019-09-16 PROCEDURE — 25000003 PHARM REV CODE 250: Mod: NTX | Performed by: PHYSICIAN ASSISTANT

## 2019-09-16 PROCEDURE — 86140 C-REACTIVE PROTEIN: CPT | Mod: NTX

## 2019-09-16 PROCEDURE — 25000003 PHARM REV CODE 250: Mod: NTX | Performed by: THORACIC SURGERY (CARDIOTHORACIC VASCULAR SURGERY)

## 2019-09-16 PROCEDURE — 85610 PROTHROMBIN TIME: CPT | Mod: NTX

## 2019-09-16 PROCEDURE — 85007 BL SMEAR W/DIFF WBC COUNT: CPT | Mod: NTX

## 2019-09-16 PROCEDURE — 20600001 HC STEP DOWN PRIVATE ROOM: Mod: NTX

## 2019-09-16 PROCEDURE — 80048 BASIC METABOLIC PNL TOTAL CA: CPT | Mod: NTX

## 2019-09-16 PROCEDURE — 99233 SBSQ HOSP IP/OBS HIGH 50: CPT | Mod: NTX,,, | Performed by: INTERNAL MEDICINE

## 2019-09-16 PROCEDURE — 93750 INTERROGATION VAD IN PERSON: CPT | Mod: NTX,,, | Performed by: INTERNAL MEDICINE

## 2019-09-16 PROCEDURE — 93010 ELECTROCARDIOGRAM REPORT: CPT | Mod: NTX,,, | Performed by: INTERNAL MEDICINE

## 2019-09-16 PROCEDURE — 93750 PR INTERROGATE VENT ASSIST DEV, IN PERSON, W PHYSICIAN ANALYSIS: ICD-10-PCS | Mod: NTX,,, | Performed by: INTERNAL MEDICINE

## 2019-09-16 PROCEDURE — 36415 COLL VENOUS BLD VENIPUNCTURE: CPT | Mod: NTX

## 2019-09-16 PROCEDURE — 83605 ASSAY OF LACTIC ACID: CPT | Mod: NTX

## 2019-09-16 PROCEDURE — 80076 HEPATIC FUNCTION PANEL: CPT | Mod: NTX

## 2019-09-16 PROCEDURE — 85027 COMPLETE CBC AUTOMATED: CPT | Mod: NTX

## 2019-09-16 PROCEDURE — 81003 URINALYSIS AUTO W/O SCOPE: CPT | Mod: NTX

## 2019-09-16 PROCEDURE — 93010 EKG 12-LEAD: ICD-10-PCS | Mod: NTX,,, | Performed by: INTERNAL MEDICINE

## 2019-09-16 PROCEDURE — 83690 ASSAY OF LIPASE: CPT | Mod: 91,NTX

## 2019-09-16 PROCEDURE — 63600175 PHARM REV CODE 636 W HCPCS: Mod: NTX | Performed by: PHYSICIAN ASSISTANT

## 2019-09-16 RX ORDER — SODIUM CHLORIDE 9 MG/ML
INJECTION, SOLUTION INTRAVENOUS CONTINUOUS
Status: ACTIVE | OUTPATIENT
Start: 2019-09-16 | End: 2019-09-16

## 2019-09-16 RX ORDER — FUROSEMIDE 10 MG/ML
80 INJECTION INTRAMUSCULAR; INTRAVENOUS ONCE
Status: COMPLETED | OUTPATIENT
Start: 2019-09-16 | End: 2019-09-16

## 2019-09-16 RX ORDER — HYDRALAZINE HYDROCHLORIDE 20 MG/ML
10 INJECTION INTRAMUSCULAR; INTRAVENOUS ONCE
Status: COMPLETED | OUTPATIENT
Start: 2019-09-16 | End: 2019-09-16

## 2019-09-16 RX ORDER — WARFARIN 3 MG/1
3 TABLET ORAL DAILY
Status: DISCONTINUED | OUTPATIENT
Start: 2019-09-16 | End: 2019-09-18

## 2019-09-16 RX ORDER — DOCUSATE SODIUM 283 MG/5ML
1 LIQUID RECTAL DAILY
Status: DISCONTINUED | OUTPATIENT
Start: 2019-09-16 | End: 2019-09-17

## 2019-09-16 RX ORDER — POTASSIUM CHLORIDE 20 MEQ/1
40 TABLET, EXTENDED RELEASE ORAL ONCE
Status: COMPLETED | OUTPATIENT
Start: 2019-09-16 | End: 2019-09-16

## 2019-09-16 RX ADMIN — OXYCODONE HYDROCHLORIDE 20 MG: 20 TABLET, FILM COATED, EXTENDED RELEASE ORAL at 08:09

## 2019-09-16 RX ADMIN — PROMETHAZINE HYDROCHLORIDE 12.5 MG: 25 INJECTION INTRAMUSCULAR; INTRAVENOUS at 03:09

## 2019-09-16 RX ADMIN — HYDROMORPHONE HYDROCHLORIDE 1 MG: 1 INJECTION, SOLUTION INTRAMUSCULAR; INTRAVENOUS; SUBCUTANEOUS at 02:09

## 2019-09-16 RX ADMIN — OXYCODONE HYDROCHLORIDE 20 MG: 20 TABLET, FILM COATED, EXTENDED RELEASE ORAL at 10:09

## 2019-09-16 RX ADMIN — POLYETHYLENE GLYCOL 3350 17 G: 17 POWDER, FOR SOLUTION ORAL at 08:09

## 2019-09-16 RX ADMIN — ASPIRIN 325 MG: 325 TABLET, COATED ORAL at 08:09

## 2019-09-16 RX ADMIN — VENLAFAXINE 150 MG: 37.5 TABLET ORAL at 08:09

## 2019-09-16 RX ADMIN — ONDANSETRON 4 MG: 2 INJECTION INTRAMUSCULAR; INTRAVENOUS at 01:09

## 2019-09-16 RX ADMIN — FUROSEMIDE 80 MG: 10 INJECTION, SOLUTION INTRAMUSCULAR; INTRAVENOUS at 04:09

## 2019-09-16 RX ADMIN — HYDRALAZINE HYDROCHLORIDE 10 MG: 20 INJECTION INTRAMUSCULAR; INTRAVENOUS at 01:09

## 2019-09-16 RX ADMIN — PROMETHAZINE HYDROCHLORIDE 12.5 MG: 25 INJECTION INTRAMUSCULAR; INTRAVENOUS at 10:09

## 2019-09-16 RX ADMIN — ONDANSETRON 4 MG: 2 INJECTION INTRAMUSCULAR; INTRAVENOUS at 08:09

## 2019-09-16 RX ADMIN — POTASSIUM CHLORIDE 40 MEQ: 20 TABLET, EXTENDED RELEASE ORAL at 08:09

## 2019-09-16 RX ADMIN — WARFARIN SODIUM 3 MG: 3 TABLET ORAL at 06:09

## 2019-09-16 RX ADMIN — SODIUM CHLORIDE: 0.9 INJECTION, SOLUTION INTRAVENOUS at 03:09

## 2019-09-16 RX ADMIN — ONDANSETRON 4 MG: 2 INJECTION INTRAMUSCULAR; INTRAVENOUS at 03:09

## 2019-09-16 RX ADMIN — PROMETHAZINE HYDROCHLORIDE 12.5 MG: 25 INJECTION INTRAMUSCULAR; INTRAVENOUS at 11:09

## 2019-09-16 RX ADMIN — DOCUSATE SODIUM 1 ENEMA: 283 LIQUID RECTAL at 04:09

## 2019-09-16 RX ADMIN — DOBUTAMINE IN DEXTROSE 2.5 MCG/KG/MIN: 200 INJECTION, SOLUTION INTRAVENOUS at 02:09

## 2019-09-16 RX ADMIN — SODIUM PHOSPHATE, DIBASIC AND SODIUM PHOSPHATE, MONOBASIC 1 ENEMA: 7; 19 ENEMA RECTAL at 12:09

## 2019-09-16 RX ADMIN — PANTOPRAZOLE SODIUM 40 MG: 40 TABLET, DELAYED RELEASE ORAL at 08:09

## 2019-09-16 RX ADMIN — SENNOSIDES,DOCUSATE SODIUM 2 TABLET: 8.6; 5 TABLET, FILM COATED ORAL at 08:09

## 2019-09-16 NOTE — SIGNIFICANT EVENT
Pt was c/o abdominal pain, back pain 9/10 to RN. RRT team aware and were at bedside. On arrival, pt states she has pain in the abdomen, back and all over. On exam: lungs decreased at bases,  CVS: S1,S2 with VAD hum\  P/A: tenderness noted, diffuse,   Back: no CVA tenderness, midline tenderness.  Pain improved when her position is changed and informed she felt better in the back  Extremities : trace edema, warm  Labs: Lactate 1.9, BNp 900 -> 500, Cr 1.4, CBC stable no white count, LDH stable, Lipase 89(UL 68).   Cxray with worsening left effusion, otherwise unchanged  Abdominal Xray    A and plan:  - No BM, likely contributing - Bowel regimen  - Xray with worsening left pleural effusion  - Abdominal Xray normal bowel gas pattern  Will give one dose of lasix diuresis, may need thoracentesis  VAD with no significant alarms  Hold heparin, INR therapeutic.  Pain management  CT from last night with no significant AAA. No visible pulsations after CRT adjustment.  Bedside echo with small pericardial effusion, moderate to severe pleural effusions   Mediastinal drain in place, chest site wound vac in place.  VAD PI events  D/W

## 2019-09-16 NOTE — NURSING
CAROLINA FRANK notified of patient having a low flow alarm while attempting to sit on the side of the bed from a lying position. Patient reports nausea, lightheadedness, and weak. Orders to follow.

## 2019-09-16 NOTE — NURSING
Notified Dr. Moreno, Doppler was 100, hydralazine given as ordered. Doppler now 84/0. Pt is very nauseated,  zofran given with no relief. Orders received for phenergran 12.5 IV once. Will continue to monitor.

## 2019-09-16 NOTE — PLAN OF CARE
Problem: Occupational Therapy Goal  Goal: Occupational Therapy Goal  Goals to be met by: 10/11/19     Patient will increase functional independence with ADLs by performing:    UE Dressing with Taney.  LE Dressing with Taney.  Grooming while standing at sink with Taney.  Toileting from toilet with Taney for hygiene and clothing management.   Bathing from  shower chair/bench with Taney.  Toilet transfer to toilet with Taney.  Increased functional strength to WFL for B UE.  Upper extremity exercise program x15 reps per handout, with independence.     Outcome: Outcome(s) achieved Date Met: 09/16/19  Continue POC     Johanna Oliva OTR/L  Pager: [phone]  9/16/2019

## 2019-09-16 NOTE — CARE UPDATE
BG goal 140 - 180. BG below goal without insulin. Po intake remains poor.     BG monitoring Ac/hs and low dose correction scale.       ** Please call Endocrine for any BG related issues **

## 2019-09-16 NOTE — PLAN OF CARE
Problem: Adult Inpatient Plan of Care  Goal: Plan of Care Review  Outcome: Ongoing (interventions implemented as appropriate)  Plan of care reviewed with patient. Pt denies questions or concerns. LVAD hum smooth, LVAD numbers WNL. Dobutamine infusing at 2.5 mcg/kg .Doppler was elevated during shift, IV hydralazine administered as ordered.  Pt given PRN zofran and One time dose of phenergran for nausea. Pt also given PRN dilaudid for pain. Urine output for shift from parrish catheter 600ml. Daily enemas ordered due to no BM. First dose given with no results thus far. Pt is resting comfortably at this time. Will continue to monitor.

## 2019-09-16 NOTE — ASSESSMENT & PLAN NOTE
-HeartMate 3 Implanted 9/10/19 as DT  -HTS Primary  -Implanted by Dr. Walden  -Continue Coumadin, Goal INR 2.0-3.0. Therapeutic today. D/c heparin bridge  -Antiplatelets  mg  -LDH is stable overall today. Will continue to monitor daily.  -Speed set at 5300, LSL 4900 rpm  -Interrogation notable for PI events  -Not listed for OHTx  -Continue  @ 2.5 mcg/kg/min  -2D echo performed 9/15/19, needs review by staff  -Lasix gtt on hold for now  -Continue Pt/OT/VAD education    Procedure: Device Interrogation Including analysis of device parameters  Current Settings: Ventricular Assist Device  Review of device function is stable/unstable stable    TXP LVAD INTERROGATIONS 9/16/2019 9/16/2019 9/16/2019 9/15/2019 9/15/2019 9/15/2019 9/15/2019   Type HeartMate3 HeartMate3 HeartMate3 HeartMate3 HeartMate3 HeartMate3 HeartMate3   Flow 3.9 4.2 3.8 3.7 3.9 - 4.0   Speed 5300 5300 5300 5300 5300 - 5300   PI 3.8 3.3 4.8 5.0 4.7 - 4.5   Power (Orellana) 3.7 3.8 3.7 3.7 3.7 - 3.7   LSL 4900 4900 4900 4900 4900 - 4900   Pulsatility - Intermittent pulse Intermittent pulse Intermittent pulse Intermittent pulse - Intermittent pulse

## 2019-09-16 NOTE — CARE UPDATE
Appears no more episodes overnight and can make some additional adjustments such as possibly switching the LV lead off if further issues given the LVAD is in place.        We will sign off. Thanks for allowing us to care for this patient. We will sign off now. Please call with any questions or concerns.

## 2019-09-16 NOTE — PT/OT/SLP PROGRESS
Occupational Therapy   Treatment    Name: Deborah Navas  MRN: [id number]  Admitting Diagnosis:  Acute on chronic combined systolic and diastolic heart failure  6 Days Post-Op    Recommendations:     Discharge Recommendations: home health PT  Discharge Equipment Recommendations:  none  Barriers to discharge:  None    Assessment:     Deborah Navas is a 48 y.o. female with a medical diagnosis of Acute on chronic combined systolic and diastolic heart failure.  She presents with performance deficits affecting function are weakness, impaired endurance, impaired self care skills, impaired functional mobilty, gait instability, impaired balance, impaired cardiopulmonary response to activity. Pt tolerated session fair but experienced low flow alarm while sitting EOB and required to immediately be returned to supine. RN notified immediately and low flow alarm stopped after returning to supine.     Rehab Prognosis:  Good; patient would benefit from acute skilled OT services to address these deficits and reach maximum level of function.       Plan:     Patient to be seen 6 x/week to address the above listed problems via self-care/home management, therapeutic exercises, therapeutic activities  · Plan of Care Expires: 10/11/19  · Plan of Care Reviewed with: patient    Subjective     Pain/Comfort:  · Pain Rating 1: 0/10  · Pain Rating Post-Intervention 1: 0/10    Objective:     Communicated with: RN prior to session.  Patient found supine with telemetry, pulse ox (continuous), LVAD, peripheral IV, wound vac upon OT entry to room. Pt agreeable to therapy session. Pt attempted to be seen in AM but nauseous. OT/PT returned in PM for 2nd attempt.     General Precautions: Standard, LVAD, fall, sternal   Orthopedic Precautions:N/A   Braces: N/A     Occupational Performance:     Bed Mobility:    · Patient completed Supine to Sit with moderate assistance  · Patient completed Sit to Supine with moderate assistance      Functional Mobility/Transfers:  · Not performed this date due to low flow alarm     Activities of Daily Living:  · No ADLs performed this date      [facility] 6 Click ADL: 14    Treatment & Education:  - Pt educated on role of OT, POC, and goals for therapy.    - therapy session deferred after low flow alarm occurred while sitting EOB   - Time provided for therapeutic counseling and discussion of health disposition.   - pt educated on importance of sitting with HOB elevated to improve upright sitting tolerance.   - Flow improved upon returning to supine  - Pt verbalized understanding. Pt expressed no further concerns/questions.  - whiteboard updated     Patient left HOB elevated with all lines intact, call button in reach and RN notifiedEducation:      GOALS:   Multidisciplinary Problems     Occupational Therapy Goals     Not on file          Multidisciplinary Problems (Resolved)        Problem: Occupational Therapy Goal    Goal Priority Disciplines Outcome Interventions   Occupational Therapy Goal   (Resolved)     OT, PT/OT Outcome(s) achieved    Description:  Goals to be met by: 10/11/19     Patient will increase functional independence with ADLs by performing:    UE Dressing with [place].  LE Dressing with [place].  Grooming while standing at sink with [place].  Toileting from toilet with [place] for hygiene and clothing management.   Bathing from  shower chair/bench with [place].  Toilet transfer to toilet with [place].  Increased functional strength to WFL for B UE.  Upper extremity exercise program x15 reps per handout, with independence.                      Time Tracking:     OT Date of Treatment: 09/16/19  OT Start Time: 1500  OT Stop Time: 1515  OT Total Time (min): 15 min    Billable Minutes:Therapeutic Activity 8    Johanna Oliva, OT  9/16/2019

## 2019-09-16 NOTE — ASSESSMENT & PLAN NOTE
-Medtronic CRT-D  -Patient began v-pacing at 60 and 9/14/19 began having phrenic nerve stimulation. Amiodarone held and EP consulted, device settings changed and PNS ceased.   -Underlying rhythm a flutter with slow ventricular response at 32 bpm

## 2019-09-16 NOTE — NURSING
Notified Dr. Moreno that doppler now at 94. Reviewed meds, inpatient and home meds with  and pt together, via telephone. Orders received to wait 1 hour and retake the doppler, if still elevated may give 10mg of IV hydralazine once, and redo doppler in 30min to ensure it has decreased. Will continue to monitor.

## 2019-09-16 NOTE — SUBJECTIVE & OBJECTIVE
Interval History: States she hasn't gotten out of bed yet but no more episodes of ID therapy. Sinus on telemetry ranging from 60s-70s. Remains on  at 2.5. Main complaint today of pain and nausea.    Review of Systems   Gastrointestinal: Positive for abdominal pain and nausea.     Objective:     Vital Signs (Most Recent):  Temp: 96 °F (35.6 °C) (09/16/19 0814)  Pulse: 89 (09/16/19 0834)  Resp: 20 (09/16/19 0814)  BP: (!) 78/0 (09/16/19 0814)  SpO2: (!) 91 % (09/16/19 0814) Vital Signs (24h Range):  Temp:  [96 °F (35.6 °C)-98.2 °F (36.8 °C)] 96 °F (35.6 °C)  Pulse:  [31-89] 89  Resp:  [16-20] 20  SpO2:  [91 %-99 %] 91 %  BP: ()/(0-65) 78/0     Weight: 106 kg (233 lb 11 oz)  Body mass index is 36.6 kg/m².     SpO2: (!) 91 %  O2 Device (Oxygen Therapy): nasal cannula    Physical Exam  Vital signs reviewed  Constitutional: Oriented to person, place, and time. Appears  well-developed and well-nourished.   HENT:   Head: Normocephalic and atraumatic.   Eyes: EOM are normal.   Neck: Normal range of motion. No JVD present.   Cardiovascular: VAD hum present  Exam reveals no gallop and no friction rub.   No murmur heard.  Pulmonary/Chest: Effort normal and breath sounds normal. No stridor. No respiratory distress. No wheezes, no rales. Sternotomy dressing in place. Chest tubes with serosang output; ATTP; wound vac in place  Abdominal: Soft. Bowel sounds are normal. There is no tenderness. There is no guarding.   Musculoskeletal: There is no edema present   Skin: Skin is warm and dry.   Psychiatric: Normal mood and affect.         Significant Labs:   EP:   Recent Labs   Lab 09/15/19  0919 09/15/19  1345 09/16/19  0243 09/16/19  0244 09/16/19  0521    139 138  --  139   K 2.9* 3.2* 3.9  --  3.8   CL 94* 96 96  --  97   CO2 33* 28 30*  --  28   * 134* 148*  --  121*   BUN 29* 27* 30*  --  31*   CREATININE 1.3 1.2 1.4  --  1.3   CALCIUM 9.9 9.5 10.2  --  10.0   PROT  --   --  6.9  --  6.9   ALBUMIN  --   --   3.3*  --  3.4*   BILITOT  --   --  0.7  --  0.7   ALKPHOS  --   --  63  --  65   AST  --   --  45*  --  46*   ALT  --   --  104*  --  102*   ANIONGAP 14 15 12  --  14   ESTGFRAFRICA 55.7* >60.0 50.9*  --  55.7*   EGFRNONAA 48.3* 53.2* 44.1*  --  48.3*   WBC 7.16  --   --  7.32  --    HGB 10.4*  --   --  10.7*  --    HCT 33.2*  --   --  32.9*  --      --   --  296  --    INR 1.5*  --  2.1*  --   --        Significant Imaging: telemetry with sinus 70s. Artifact bradycardia in room

## 2019-09-16 NOTE — PLAN OF CARE
Problem: Adult Inpatient Plan of Care  Goal: Plan of Care Review  Outcome: Ongoing (interventions implemented as appropriate)  Plan of care reviewed with patient. Patient remains free from injury. Continuous pulse ox monitored. Dobutamine gtt continued. All questions and concerns addressed. LVAD education encouraged. Call bell in reach. Bed locked and in lowest position. Will continue to monitor.

## 2019-09-16 NOTE — NURSING
Dr. Dunaway at [id number] notified of patient's HR being 36-48. Patient's symptoms are unchanged. Will continue to monitor.

## 2019-09-16 NOTE — ASSESSMENT & PLAN NOTE
-Bilateral pleural effusions present, L>R  -Will review with staff and CTS, may need speed change?

## 2019-09-16 NOTE — PROGRESS NOTES
[facility]  Heart Transplant  Progress Note    Patient Name: Deborah Navas  MRN: [id number]  Admission Date: 9/4/2019  Hospital Length of Stay: 12 days  Attending Physician: Jolene Lua MD  Primary Care Provider: Primary Doctor No  Principal Problem:Acute on chronic combined systolic and diastolic heart failure    Subjective:     Interval History: Patient had abdominal pain overnight and continues this am. Lasix gtt stopped. Given docusate enema this morning without response. Thinks she will feel better if she is able to have a BM.     Continuous Infusions:   DOBUTamine 2.5 mcg/kg/min (09/15/19 0541)     Scheduled Meds:   aspirin  325 mg Oral Daily    docusate sodium  1 enema Rectal Daily    oxyCODONE  20 mg Oral Q12H    pantoprazole  40 mg Oral Daily    polyethylene glycol  17 g Oral Daily    ramelteon  8 mg Oral QHS    senna-docusate 8.6-50 mg  2 tablet Oral Daily    venlafaxine  150 mg Oral Daily    warfarin  5 mg Oral Daily     PRN Meds:albumin human 5%, albuterol sulfate, bisacodyl, Dextrose 10% Bolus, Dextrose 10% Bolus, fentaNYL, glucagon (human recombinant), glucose, glucose, hydrocortisone, HYDROmorphone, hydrOXYzine HCl, insulin aspart U-100, magnesium hydroxide 400 mg/5 ml, magnesium sulfate IVPB, ondansetron, promethazine (PHENERGAN) IVPB, simethicone, sodium chloride 0.9%, sodium phosphates    Review of patient's allergies indicates:   Allergen Reactions    Adhesive Blisters     Reaction to area in chest and up only    Codeine Itching     Objective:     Vital Signs (Most Recent):  Temp: 96 °F (35.6 °C) (09/16/19 0814)  Pulse: 89 (09/16/19 0834)  Resp: 20 (09/16/19 0814)  BP: (!) 78/0 (09/16/19 0814)  SpO2: (!) 91 % (09/16/19 0814) Vital Signs (24h Range):  Temp:  [96 °F (35.6 °C)-98.2 °F (36.8 °C)] 96 °F (35.6 °C)  Pulse:  [31-89] 89  Resp:  [16-20] 20  SpO2:  [91 %-99 %] 91 %  BP: ()/(0-65) 78/0     Patient Vitals for the past 72 hrs (Last 3  readings):   Weight   09/16/19 0500 106 kg (233 lb 11 oz)   09/15/19 1141 105.7 kg (233 lb)   09/15/19 0600 105.8 kg (233 lb 4 oz)     Body mass index is 36.6 kg/m².      Intake/Output Summary (Last 24 hours) at 9/16/2019 1006  Last data filed at 9/16/2019 0800  Gross per 24 hour   Intake 1148 ml   Output 1460 ml   Net -312 ml       Hemodynamic Parameters:       Physical Exam   Constitutional: She is oriented to person, place, and time. She appears well-developed and well-nourished. She appears distressed (mild).   HENT:   Head: Normocephalic and atraumatic.   Cardiovascular: Normal rate and regular rhythm.   vad hum smooth. Skin vac in place to sternal incision.    Pulmonary/Chest: Effort normal and breath sounds normal. No stridor. No respiratory distress.       Abdominal: Soft. Bowel sounds are normal. She exhibits no distension. There is tenderness (mild).   Musculoskeletal: Normal range of motion. She exhibits no edema.   Neurological: She is alert and oriented to person, place, and time.   Skin: Skin is warm and dry.   Psychiatric: She has a normal mood and affect. Her behavior is normal. Judgment and thought content normal.   Nursing note and vitals reviewed.      Significant Labs:  CBC:  Recent Labs   Lab 09/14/19  0610 09/15/19  0919 09/16/19  0244   WBC 8.00 7.16 7.32   RBC 3.10* 3.64* 3.66*   HGB 8.9* 10.4* 10.7*   HCT 28.9* 33.2* 32.9*    254 296   MCV 93 91 90   MCH 28.7 28.6 29.2   MCHC 30.8* 31.3* 32.5     BNP:  Recent Labs   Lab 09/11/19  0400 09/13/19  0400 09/16/19  0243   * 987* 522*     CMP:  Recent Labs   Lab 09/13/19  0400  09/15/19  1345 09/16/19  0243 09/16/19  0521   GLU 98   < > 134* 148* 121*   CALCIUM 9.6   < > 9.5 10.2 10.0   ALBUMIN 3.6  --   --  3.3* 3.4*   PROT 6.3  --   --  6.9 6.9      < > 139 138 139   K 3.5   < > 3.2* 3.9 3.8   CO2 25   < > 28 30* 28   CL 98   < > 96 96 97   BUN 31*   < > 27* 30* 31*   CREATININE 1.6*   < > 1.2 1.4 1.3   ALKPHOS 66  --   --  63  65   *  --   --  104* 102*   *  --   --  45* 46*   BILITOT 1.8*  --   --  0.7 0.7    < > = values in this interval not displayed.      Coagulation:   Recent Labs   Lab 09/14/19  0610 09/15/19  0919 09/16/19  0243 09/16/19  0521   INR 1.1 1.5* 2.1*  --    APTT 26.9 29.2 33.8* 24.8     LDH:  Recent Labs   Lab 09/14/19  0610 09/15/19  0919 09/16/19  0243   * 342* 351*     Microbiology:  Microbiology Results (last 7 days)     Procedure Component Value Units Date/Time    Blood culture [[id number]] Collected:  09/11/19 0810    Order Status:  Completed Specimen:  Blood from Peripheral, Antecubital, Left Updated:  09/15/19 1012     Blood Culture, Routine No Growth to date      No Growth to date      No Growth to date      No Growth to date      No Growth to date    Blood culture [[id number]] Collected:  09/11/19 0832    Order Status:  Completed Specimen:  Blood from Line, Jugular, Internal Right Updated:  09/15/19 1012     Blood Culture, Routine No Growth to date      No Growth to date      No Growth to date      No Growth to date      No Growth to date    HPV High Risk Genotypes, PCR [[id number]] Collected:  09/11/19 1330    Order Status:  No result Updated:  09/12/19 1006    HPV High Risk Genotypes, PCR [[id number]]     Order Status:  Completed     Blood culture [[id number]] Collected:  09/11/19 0820    Order Status:  Sent Specimen:  Blood from Line, Jugular, Internal Left Updated:  09/11/19 0822          I have reviewed all pertinent labs within the past 24 hours.    Estimated Creatinine Clearance: 65.6 mL/min (based on SCr of 1.3 mg/dL).    Diagnostic Results:  I have reviewed and interpreted all pertinent imaging results/findings within the past 24 hours.    Assessment and Plan:     No notes on file    * Acute on chronic combined systolic and diastolic heart failure  NICM EF 20% s/p LVAD 9/10  RHC: done on admit 9/4/19 RA: 20/ 20/ 18 RV: 60/ 8/ 18 PA: 60/ 32/ 45 PWP: 43/ 70/ 40 . Cardiac output was 2.27  by Fitz. Cardiac index is 1.04 L/min/m2. O2 Sat: PA 34%. AO 95% PVR 2.2 PALMER    -see s/p LVAD      LVAD (left ventricular assist device) present  -HeartMate 3 Implanted 9/10/19 as DT  -HTS Primary  -Implanted by Dr. Walden  -Continue Coumadin, Goal INR 2.0-3.0. Therapeutic today. D/c heparin bridge  -Antiplatelets  mg  -LDH is stable overall today. Will continue to monitor daily.  -Speed set at 5300, LSL 4900 rpm  -Interrogation notable for PI events  -Not listed for OHTx  -Continue  @ 2.5 mcg/kg/min  -2D echo performed 9/15/19, needs review by staff  -Lasix gtt on hold for now  -Continue Pt/OT/VAD education    Procedure: Device Interrogation Including analysis of device parameters  Current Settings: Ventricular Assist Device  Review of device function is stable/unstable stable    TXP LVAD INTERROGATIONS 9/16/2019 9/16/2019 9/16/2019 9/15/2019 9/15/2019 9/15/2019 9/15/2019   Type HeartMate3 HeartMate3 HeartMate3 HeartMate3 HeartMate3 HeartMate3 HeartMate3   Flow 3.9 4.2 3.8 3.7 3.9 - 4.0   Speed 5300 5300 5300 5300 5300 - 5300   PI 3.8 3.3 4.8 5.0 4.7 - 4.5   Power (Orellana) 3.7 3.8 3.7 3.7 3.7 - 3.7   LSL 4900 4900 4900 4900 4900 - 4900   Pulsatility - Intermittent pulse Intermittent pulse Intermittent pulse Intermittent pulse - Intermittent pulse       Cardiac defibrillator in situ  -Medtronic CRT-D  -Patient began v-pacing at 60 and 9/14/19 began having phrenic nerve stimulation. Amiodarone held and EP consulted, device settings changed and PNS ceased.   -Underlying rhythm a flutter with slow ventricular response at 32 bpm    Pleural effusion  -Bilateral pleural effusions present, L>R  -Will review with staff and CTS, may need speed change?    CKD (chronic kidney disease)  -Creatinine at baseline  - Avoid nephrotoxic agents    Abnormal CT scan  -focal opacity found at right base.  DDx: atelectasis.  Recommending repeat CT in 3 months    Enlarged thyroid  Ultrasound done and showed enlarged multinodular  thyroid which warrants surveillance in one year.   - TSH and fT4 wnl    History of ventricular tachycardia  -In the setting of hypokalemia  -Monitor electrolytes closely    Ventricular tachyarrhythmia  - On amio at home, hold for now (see above)   - Goal K >4 and Mg >2  - Monitor on telemetry      Adry Cruz PA-C  Heart Transplant  [facility]

## 2019-09-16 NOTE — SUBJECTIVE & OBJECTIVE
Interval History: Patient had abdominal pain overnight and continues this am. Lasix gtt stopped. Given docusate enema this morning without response. Thinks she will feel better if she is able to have a BM.     Continuous Infusions:   DOBUTamine 2.5 mcg/kg/min (09/15/19 0541)     Scheduled Meds:   aspirin  325 mg Oral Daily    docusate sodium  1 enema Rectal Daily    oxyCODONE  20 mg Oral Q12H    pantoprazole  40 mg Oral Daily    polyethylene glycol  17 g Oral Daily    ramelteon  8 mg Oral QHS    senna-docusate 8.6-50 mg  2 tablet Oral Daily    venlafaxine  150 mg Oral Daily    warfarin  5 mg Oral Daily     PRN Meds:albumin human 5%, albuterol sulfate, bisacodyl, Dextrose 10% Bolus, Dextrose 10% Bolus, fentaNYL, glucagon (human recombinant), glucose, glucose, hydrocortisone, HYDROmorphone, hydrOXYzine HCl, insulin aspart U-100, magnesium hydroxide 400 mg/5 ml, magnesium sulfate IVPB, ondansetron, promethazine (PHENERGAN) IVPB, simethicone, sodium chloride 0.9%, sodium phosphates    Review of patient's allergies indicates:   Allergen Reactions    Adhesive Blisters     Reaction to area in chest and up only    Codeine Itching     Objective:     Vital Signs (Most Recent):  Temp: 96 °F (35.6 °C) (09/16/19 0814)  Pulse: 89 (09/16/19 0834)  Resp: 20 (09/16/19 0814)  BP: (!) 78/0 (09/16/19 0814)  SpO2: (!) 91 % (09/16/19 0814) Vital Signs (24h Range):  Temp:  [96 °F (35.6 °C)-98.2 °F (36.8 °C)] 96 °F (35.6 °C)  Pulse:  [31-89] 89  Resp:  [16-20] 20  SpO2:  [91 %-99 %] 91 %  BP: ()/(0-65) 78/0     Patient Vitals for the past 72 hrs (Last 3 readings):   Weight   09/16/19 0500 106 kg (233 lb 11 oz)   09/15/19 1141 105.7 kg (233 lb)   09/15/19 0600 105.8 kg (233 lb 4 oz)     Body mass index is 36.6 kg/m².      Intake/Output Summary (Last 24 hours) at 9/16/2019 1006  Last data filed at 9/16/2019 0800  Gross per 24 hour   Intake 1148 ml   Output 1460 ml   Net -312 ml       Hemodynamic Parameters:       Physical  Exam   Constitutional: She is oriented to person, place, and time. She appears well-developed and well-nourished. She appears distressed (mild).   HENT:   Head: Normocephalic and atraumatic.   Cardiovascular: Normal rate and regular rhythm.   vad hum smooth. Skin vac in place to sternal incision.    Pulmonary/Chest: Effort normal and breath sounds normal. No stridor. No respiratory distress.       Abdominal: Soft. Bowel sounds are normal. She exhibits no distension. There is tenderness (mild).   Musculoskeletal: Normal range of motion. She exhibits no edema.   Neurological: She is alert and oriented to person, place, and time.   Skin: Skin is warm and dry.   Psychiatric: She has a normal mood and affect. Her behavior is normal. Judgment and thought content normal.   Nursing note and vitals reviewed.      Significant Labs:  CBC:  Recent Labs   Lab 09/14/19  0610 09/15/19  0919 09/16/19  0244   WBC 8.00 7.16 7.32   RBC 3.10* 3.64* 3.66*   HGB 8.9* 10.4* 10.7*   HCT 28.9* 33.2* 32.9*    254 296   MCV 93 91 90   MCH 28.7 28.6 29.2   MCHC 30.8* 31.3* 32.5     BNP:  Recent Labs   Lab 09/11/19  0400 09/13/19  0400 09/16/19  0243   * 987* 522*     CMP:  Recent Labs   Lab 09/13/19  0400  09/15/19  1345 09/16/19  0243 09/16/19  0521   GLU 98   < > 134* 148* 121*   CALCIUM 9.6   < > 9.5 10.2 10.0   ALBUMIN 3.6  --   --  3.3* 3.4*   PROT 6.3  --   --  6.9 6.9      < > 139 138 139   K 3.5   < > 3.2* 3.9 3.8   CO2 25   < > 28 30* 28   CL 98   < > 96 96 97   BUN 31*   < > 27* 30* 31*   CREATININE 1.6*   < > 1.2 1.4 1.3   ALKPHOS 66  --   --  63 65   *  --   --  104* 102*   *  --   --  45* 46*   BILITOT 1.8*  --   --  0.7 0.7    < > = values in this interval not displayed.      Coagulation:   Recent Labs   Lab 09/14/19  0610 09/15/19  0919 09/16/19  0243 09/16/19  0521   INR 1.1 1.5* 2.1*  --    APTT 26.9 29.2 33.8* 24.8     LDH:  Recent Labs   Lab 09/14/19  0610 09/15/19  0919 09/16/19  0243   LDH  343* 342* 351*     Microbiology:  Microbiology Results (last 7 days)     Procedure Component Value Units Date/Time    Blood culture [[id number]] Collected:  09/11/19 0810    Order Status:  Completed Specimen:  Blood from Peripheral, Antecubital, Left Updated:  09/15/19 1012     Blood Culture, Routine No Growth to date      No Growth to date      No Growth to date      No Growth to date      No Growth to date    Blood culture [[id number]] Collected:  09/11/19 0832    Order Status:  Completed Specimen:  Blood from Line, Jugular, Internal Right Updated:  09/15/19 1012     Blood Culture, Routine No Growth to date      No Growth to date      No Growth to date      No Growth to date      No Growth to date    HPV High Risk Genotypes, PCR [[id number]] Collected:  09/11/19 1330    Order Status:  No result Updated:  09/12/19 1006    HPV High Risk Genotypes, PCR [[id number]]     Order Status:  Completed     Blood culture [[id number]] Collected:  09/11/19 0820    Order Status:  Sent Specimen:  Blood from Line, Jugular, Internal Left Updated:  09/11/19 0822          I have reviewed all pertinent labs within the past 24 hours.    Estimated Creatinine Clearance: 65.6 mL/min (based on SCr of 1.3 mg/dL).    Diagnostic Results:  I have reviewed and interpreted all pertinent imaging results/findings within the past 24 hours.

## 2019-09-16 NOTE — PROGRESS NOTES
09/16/2019  Chrsito Cooper    Current provider:  Jolene Lua MD      I, Christo Cooper, rounded on Deborah Oliva Navas to ensure all mechanical assist device settings (IABP or VAD) were appropriate and all parameters were within limits.  I was able to ensure all back up equipment was present, the staff had no issues, and the Perfusion Department daily rounding was complete.    10:24 AM

## 2019-09-16 NOTE — ASSESSMENT & PLAN NOTE
With diaphragmatic stimulation with V-pacing  Resolved with switching LV 1-2 pacing with LV 4-3 pacing  Mode switched to VVIR    Of note, patient became light headed and experienced brief syncope for a few seconds after sitting up with OT, no VAD alarms, no events on ICD interrogation. HR read 36 on telemetry. We repeated this maneuver with the ICD  running, the patient experienced the same symptoms and had low heart rates in the 30's and 40's while the ICD EGM revealed a stable rate at 60. These low heart rates on telemetry appear to be artifact due to LVAD interference.    Recs:  - continue to monitor lytes and volume status  - will continue with current device modifications    We will sign off. Thanks for allowing us to care for this patient. We will sign off now. Please call with any questions or concerns.

## 2019-09-16 NOTE — NURSING
Notified RR nurse and CN Tumi, pt complains of sharp chest to mid back, started suddenly. 9/10. Pt is also complaining of nausea, face is red. RR nurses, CN, and MD at bedside.

## 2019-09-16 NOTE — CARE UPDATE
"RAPID RESPONSE NURSE PROACTIVE ROUNDING NOTE     Time of Visit: 215    Admit Date: 2019  LOS: 12  Code Status: Full Code   Date of Visit: 2019  : 1970  Age: 48 y.o.  Sex: female  Race: White  Bed: [facility] 3073/[facility] 3073 A:   MRN: [id number]  Was the patient discharged from an ICU this admission? yes   Was the patient discharged from a PACU within last 24 hours?  no  Did the patient receive conscious sedation/general anesthesia in last 24 hours?  no  Was the patient in the ED within the past 24 hours?  no  Was the patient started on NIPPV within the past 24 hours?  no  Attending Physician: Jolene Lua MD  Primary Service: Networked reference to record PCT     ASSESSMENT     Diagnosis: Acute on chronic combined systolic and diastolic heart failure    Abnormal Vital Signs: BP (!) 78/0   Pulse (!) 59   Temp 97.7 °F (36.5 °C) (Oral)   Resp 20   Ht 5' 7" (1.702 m)   Wt 105.7 kg (233 lb)   LMP  (Within Years)   SpO2 (!) 94%   Breastfeeding? No   BMI 36.49 kg/m²      Clinical Issues: Respiratory; Circulatory    Patient  has a past medical history of Fractures, History of ventricular fibrillation, History of ventricular tachycardia, Hyperlipidemia, Migraine, and Osteoarthritis.    Notified by charge RN to evaluate the patient who is complaining of back pain described as stabbing and constant, localized to her mid-back region.  Patient states she was laying down, not doing anything out of the norm when the pain began.  Patient is guarding, rates the pain 9/10, states it gets worse with inspiration.  Chest tube was not placed to suction as ordered, restored CT to suction, no relief of pain noted.  Lungs are clear on auscultation bilaterally, LVAD hum regular with intermittent artifical pulse noted.  Patient's face is flushed red.  DPs obtained in both arms, 80/0 on LUE and 78/0 on RUE.  Patient did have episode of hypertension with /0 at midnight, was given 10 mg hydralazine with DP down to " 84/0.  Patient became nauseated and received ondansterone and promethazine.  Last bowel movement was on 9/9/2019, she has not had a BM since her LVAD implant.      INTERVENTIONS/ RECOMMENDATIONS     12-lead ECG obtained, CXR and KUB, labs obtained STAT, bedside TTE per Dr. Moreno.  Patient given PRN Dilaudid for pain management.  Could also try non-pharmacological pain management such as heat packets, music therapy, or biofeedback exercises.  Abdominal ultrasound ordered per MD.      Discussed plan of care with RN, Kala (primary), Juan Daniel (charge).    PHYSICIAN ESCALATION     Yes/No  yes    Orders received and case discussed with Dr. Moreno.    Disposition: Remain in room 3073.    FOLLOW-UP     Call back the Rapid Response Nurse, Arthur Shah RN at [phone] for additional questions or concerns.

## 2019-09-16 NOTE — PT/OT/SLP PROGRESS
Physical Therapy Treatment    Patient Name:  Deborah Navas   MRN:  [id number]    Recommendations:     Discharge Recommendations:  home health PT   Discharge Equipment Recommendations: none   Barriers to discharge: not medically appropriate    Assessment:     Deborah Navas is a 48 y.o. female admitted with a medical diagnosis of Acute on chronic combined systolic and diastolic heart failure.  She presents with the following impairments/functional limitations:  weakness, impaired endurance, impaired functional mobilty, gait instability, impaired balance, impaired cardiopulmonary response to activity. Pt with reported dizziness upon transferring from supine to sitting EOB. Pt performed ankle pumps, hand pumps, and answered questions with no improvement in dizziness. Pt then experienced a low flow alarm and was returned to supine. Flow improved to 3.9 and pt reported decreased dizziness. Pt with persistent nausea throughout session. Pt would continue to benefit from acute skilled therapy intervention to address deficits and progress toward prior level of function.       Rehab Prognosis: Good; patient would benefit from acute skilled PT services to address these deficits and reach maximum level of function.    Recent Surgery: Procedure(s) (LRB):  INSERTION-LEFT VENTRICULAR ASSIST DEVICE (N/A)  INSERTION, GRAFT, PERICARDIUM  CLOSURE, WOUND, STERNUM 6 Days Post-Op    Plan:     During this hospitalization, patient to be seen 6 x/week to address the identified rehab impairments via gait training, therapeutic activities, therapeutic exercises, neuromuscular re-education and progress toward the following goals:    · Plan of Care Expires:  10/10/19    Subjective     Chief Complaint: Pt c/o nausea   Patient/Family Comments/goals: to get better and return home   Pain/Comfort:  · Pain Rating 1: 0/10  · Pain Rating Post-Intervention 1: 0/10      Objective:     Communicated with RN prior to session.  Patient found  supine with telemetry, pulse ox (continuous), LVAD, peripheral IV, wound vac, parrish catheter upon PT entry to room.     General Precautions: Standard, fall, LVAD, sternal   Orthopedic Precautions:N/A   Braces: N/A     Functional Mobility:  · Bed Mobility:     · Supine to Sit: moderate assistance  · Sit to Supine: moderate assistance      AM-PAC 6 CLICK MOBILITY  Turning over in bed (including adjusting bedclothes, sheets and blankets)?: 3  Sitting down on and standing up from a chair with arms (e.g., wheelchair, bedside commode, etc.): 1  Moving from lying on back to sitting on the side of the bed?: 3  Moving to and from a bed to a chair (including a wheelchair)?: 1  Need to walk in hospital room?: 1  Climbing 3-5 steps with a railing?: 1  Basic Mobility Total Score: 10       Therapeutic Activities and Exercises:   Pt educated on role of PT/POC. Pt verbalized understanding.   Pt encouraged to only perform OOB mobility with assistance from nursing/therapy. Pt agreeable.   Pt encouraged to perform bed exercises with B UE and B LE to maintain mobility and strength.   Pt encouraged to sit in bed with HOB elevated if able to tolerate.   LVAD to wall power throughout session. Low flow alarm sounded while sitting EOB. Flow improved following return to supine.     Patient left HOB elevated with all lines intact, call button in reach and RN notified..    GOALS:   Multidisciplinary Problems     Physical Therapy Goals        Problem: Physical Therapy Goal    Goal Priority Disciplines Outcome Goal Variances Interventions   Physical Therapy Goal     PT, PT/OT Ongoing (interventions implemented as appropriate)     Description:  Goals to be met by: 10/1/2019    Patient will increase functional independence with mobility by performin. Supine to sit with Contact Guard Assistance - not met  2. Sit to stand transfer with Supervision - not met  3. Gait  x 200 feet with Supervision  - not met  4. Ascend/descend 2 stair with  Contact Guard Assistance - not met                      Time Tracking:     PT Received On: 09/16/19  PT Start Time: 1458     PT Stop Time: 1516  PT Total Time (min): 18 min     Billable Minutes: Therapeutic Activity 18 mins     Treatment Type: Treatment  PT/PTA: PT     PTA Visit Number: 0     Consuelo Toledo, PT  09/16/2019

## 2019-09-17 LAB
ALBUMIN SERPL BCP-MCNC: 3.3 G/DL (ref 3.5–5.2)
ALP SERPL-CCNC: 67 U/L (ref 55–135)
ALT SERPL W/O P-5'-P-CCNC: 71 U/L (ref 10–44)
ANION GAP SERPL CALC-SCNC: 17 MMOL/L (ref 8–16)
APTT BLDCRRT: 39.1 SEC (ref 21–32)
AST SERPL-CCNC: 30 U/L (ref 10–40)
B CELL RESULTS - XM AUTO: NEGATIVE
B MCS AVERAGE - XM AUTO: 3.1
BASOPHILS # BLD AUTO: 0.04 K/UL (ref 0–0.2)
BASOPHILS NFR BLD: 0.4 % (ref 0–1.9)
BILIRUB SERPL-MCNC: 0.7 MG/DL (ref 0.1–1)
BILIRUB UR QL STRIP: NEGATIVE
BUN SERPL-MCNC: 28 MG/DL (ref 6–20)
CALCIUM SERPL-MCNC: 9.9 MG/DL (ref 8.7–10.5)
CHLORIDE SERPL-SCNC: 96 MMOL/L (ref 95–110)
CLARITY UR REFRACT.AUTO: CLEAR
CO2 SERPL-SCNC: 23 MMOL/L (ref 23–29)
COLOR UR AUTO: YELLOW
CREAT SERPL-MCNC: 1.4 MG/DL (ref 0.5–1.4)
DIFFERENTIAL METHOD: ABNORMAL
EOSINOPHIL # BLD AUTO: 0.2 K/UL (ref 0–0.5)
EOSINOPHIL NFR BLD: 2.4 % (ref 0–8)
ERYTHROCYTE [DISTWIDTH] IN BLOOD BY AUTOMATED COUNT: 15.1 % (ref 11.5–14.5)
EST. GFR  (AFRICAN AMERICAN): 50.9 ML/MIN/1.73 M^2
EST. GFR  (NON AFRICAN AMERICAN): 44.1 ML/MIN/1.73 M^2
FXMAU TESTING DATE: NORMAL
GLUCOSE SERPL-MCNC: 172 MG/DL (ref 70–110)
GLUCOSE UR QL STRIP: NEGATIVE
HCT VFR BLD AUTO: 38.1 % (ref 37–48.5)
HGB BLD-MCNC: 11.3 G/DL (ref 12–16)
HGB UR QL STRIP: NEGATIVE
HLA AB QL: NEGATIVE
HLA AB SERPL: NEGATIVE
HLATY INTERPRETATION: NORMAL
IMM GRANULOCYTES # BLD AUTO: 0.09 K/UL (ref 0–0.04)
IMM GRANULOCYTES NFR BLD AUTO: 1 % (ref 0–0.5)
INR PPP: 3.4 (ref 0.8–1.2)
KETONES UR QL STRIP: NEGATIVE
LDH SERPL L TO P-CCNC: 309 U/L (ref 110–260)
LEUKOCYTE ESTERASE UR QL STRIP: NEGATIVE
LYMPHOCYTES # BLD AUTO: 1.2 K/UL (ref 1–4.8)
LYMPHOCYTES NFR BLD: 12.9 % (ref 18–48)
MAGNESIUM SERPL-MCNC: 2.2 MG/DL (ref 1.6–2.6)
MCH RBC QN AUTO: 28 PG (ref 27–31)
MCHC RBC AUTO-ENTMCNC: 29.7 G/DL (ref 32–36)
MCV RBC AUTO: 95 FL (ref 82–98)
MONOCYTES # BLD AUTO: 0.9 K/UL (ref 0.3–1)
MONOCYTES NFR BLD: 9.4 % (ref 4–15)
NEUTROPHILS # BLD AUTO: 6.9 K/UL (ref 1.8–7.7)
NEUTROPHILS NFR BLD: 73.9 % (ref 38–73)
NITRITE UR QL STRIP: NEGATIVE
NRBC BLD-RTO: 0 /100 WBC
PH UR STRIP: 5 [PH] (ref 5–8)
PLATELET # BLD AUTO: 370 K/UL (ref 150–350)
PMV BLD AUTO: 9.7 FL (ref 9.2–12.9)
POCT GLUCOSE: 113 MG/DL (ref 70–110)
POCT GLUCOSE: 120 MG/DL (ref 70–110)
POCT GLUCOSE: 145 MG/DL (ref 70–110)
POCT GLUCOSE: 148 MG/DL (ref 70–110)
POCT GLUCOSE: 97 MG/DL (ref 70–110)
POTASSIUM SERPL-SCNC: 4.3 MMOL/L (ref 3.5–5.1)
PROT SERPL-MCNC: 7.4 G/DL (ref 6–8.4)
PROT UR QL STRIP: NEGATIVE
PROTHROMBIN TIME: 33 SEC (ref 9–12.5)
RBC # BLD AUTO: 4.03 M/UL (ref 4–5.4)
SCRFL TESTING DATE: NORMAL
SERUM COLLECTION DT - XM AUTO: NORMAL
SERUM COLLECTION DT: NORMAL
SODIUM SERPL-SCNC: 136 MMOL/L (ref 136–145)
SP GR UR STRIP: 1.02 (ref 1–1.03)
T CELL RESULTS - XM AUTO: NEGATIVE
T MCS AVERAGE - XM AUTO: 4
URN SPEC COLLECT METH UR: NORMAL
WBC # BLD AUTO: 9.29 K/UL (ref 3.9–12.7)

## 2019-09-17 PROCEDURE — 25000003 PHARM REV CODE 250: Mod: NTX | Performed by: INTERNAL MEDICINE

## 2019-09-17 PROCEDURE — 25000003 PHARM REV CODE 250: Mod: NTX | Performed by: STUDENT IN AN ORGANIZED HEALTH CARE EDUCATION/TRAINING PROGRAM

## 2019-09-17 PROCEDURE — 85730 THROMBOPLASTIN TIME PARTIAL: CPT | Mod: NTX

## 2019-09-17 PROCEDURE — 20600001 HC STEP DOWN PRIVATE ROOM: Mod: NTX

## 2019-09-17 PROCEDURE — 63600175 PHARM REV CODE 636 W HCPCS: Mod: NTX | Performed by: PHYSICIAN ASSISTANT

## 2019-09-17 PROCEDURE — 36415 COLL VENOUS BLD VENIPUNCTURE: CPT | Mod: NTX

## 2019-09-17 PROCEDURE — 80053 COMPREHEN METABOLIC PANEL: CPT | Mod: NTX

## 2019-09-17 PROCEDURE — 25000003 PHARM REV CODE 250: Mod: NTX | Performed by: THORACIC SURGERY (CARDIOTHORACIC VASCULAR SURGERY)

## 2019-09-17 PROCEDURE — 27000248 HC VAD-ADDITIONAL DAY: Mod: NTX

## 2019-09-17 PROCEDURE — 97530 THERAPEUTIC ACTIVITIES: CPT | Mod: NTX

## 2019-09-17 PROCEDURE — 63600175 PHARM REV CODE 636 W HCPCS: Mod: NTX | Performed by: INTERNAL MEDICINE

## 2019-09-17 PROCEDURE — 97535 SELF CARE MNGMENT TRAINING: CPT | Mod: NTX

## 2019-09-17 PROCEDURE — 83735 ASSAY OF MAGNESIUM: CPT | Mod: NTX

## 2019-09-17 PROCEDURE — 99233 PR SUBSEQUENT HOSPITAL CARE,LEVL III: ICD-10-PCS | Mod: NTX,,, | Performed by: INTERNAL MEDICINE

## 2019-09-17 PROCEDURE — 93750 INTERROGATION VAD IN PERSON: CPT | Mod: NTX,,, | Performed by: INTERNAL MEDICINE

## 2019-09-17 PROCEDURE — 63600175 PHARM REV CODE 636 W HCPCS: Mod: NTX | Performed by: NURSE PRACTITIONER

## 2019-09-17 PROCEDURE — 83615 LACTATE (LD) (LDH) ENZYME: CPT | Mod: NTX

## 2019-09-17 PROCEDURE — 85610 PROTHROMBIN TIME: CPT | Mod: NTX

## 2019-09-17 PROCEDURE — 25000003 PHARM REV CODE 250: Mod: NTX | Performed by: NURSE PRACTITIONER

## 2019-09-17 PROCEDURE — 93750 PR INTERROGATE VENT ASSIST DEV, IN PERSON, W PHYSICIAN ANALYSIS: ICD-10-PCS | Mod: NTX,,, | Performed by: INTERNAL MEDICINE

## 2019-09-17 PROCEDURE — 85025 COMPLETE CBC W/AUTO DIFF WBC: CPT | Mod: NTX

## 2019-09-17 PROCEDURE — 84134 ASSAY OF PREALBUMIN: CPT | Mod: NTX

## 2019-09-17 PROCEDURE — 99233 SBSQ HOSP IP/OBS HIGH 50: CPT | Mod: NTX,,, | Performed by: INTERNAL MEDICINE

## 2019-09-17 PROCEDURE — 81003 URINALYSIS AUTO W/O SCOPE: CPT | Mod: NTX

## 2019-09-17 RX ORDER — OXYCODONE HYDROCHLORIDE 5 MG/1
10 TABLET ORAL EVERY 6 HOURS PRN
Status: DISCONTINUED | OUTPATIENT
Start: 2019-09-17 | End: 2019-09-28

## 2019-09-17 RX ORDER — DOCUSATE SODIUM 283 MG/5ML
1 LIQUID RECTAL DAILY PRN
Status: DISCONTINUED | OUTPATIENT
Start: 2019-09-17 | End: 2019-09-20

## 2019-09-17 RX ORDER — OXYCODONE AND ACETAMINOPHEN 5; 325 MG/1; MG/1
1 TABLET ORAL EVERY 4 HOURS PRN
Status: DISCONTINUED | OUTPATIENT
Start: 2019-09-17 | End: 2019-10-01 | Stop reason: HOSPADM

## 2019-09-17 RX ORDER — AMOXICILLIN 250 MG
2 CAPSULE ORAL 2 TIMES DAILY
Status: DISCONTINUED | OUTPATIENT
Start: 2019-09-17 | End: 2019-10-01 | Stop reason: HOSPADM

## 2019-09-17 RX ADMIN — SENNOSIDES,DOCUSATE SODIUM 2 TABLET: 8.6; 5 TABLET, FILM COATED ORAL at 08:09

## 2019-09-17 RX ADMIN — DOBUTAMINE IN DEXTROSE 2.5 MCG/KG/MIN: 200 INJECTION, SOLUTION INTRAVENOUS at 11:09

## 2019-09-17 RX ADMIN — OXYCODONE HYDROCHLORIDE AND ACETAMINOPHEN 1 TABLET: 5; 325 TABLET ORAL at 09:09

## 2019-09-17 RX ADMIN — ONDANSETRON 4 MG: 2 INJECTION INTRAMUSCULAR; INTRAVENOUS at 09:09

## 2019-09-17 RX ADMIN — PROMETHAZINE HYDROCHLORIDE 25 MG: 25 INJECTION INTRAMUSCULAR; INTRAVENOUS at 06:09

## 2019-09-17 RX ADMIN — PROMETHAZINE HYDROCHLORIDE 12.5 MG: 25 INJECTION INTRAMUSCULAR; INTRAVENOUS at 10:09

## 2019-09-17 RX ADMIN — VENLAFAXINE 150 MG: 37.5 TABLET ORAL at 08:09

## 2019-09-17 RX ADMIN — POLYETHYLENE GLYCOL 3350 17 G: 17 POWDER, FOR SOLUTION ORAL at 08:09

## 2019-09-17 RX ADMIN — OXYCODONE HYDROCHLORIDE 20 MG: 20 TABLET, FILM COATED, EXTENDED RELEASE ORAL at 08:09

## 2019-09-17 RX ADMIN — WARFARIN SODIUM 3 MG: 3 TABLET ORAL at 06:09

## 2019-09-17 RX ADMIN — ASPIRIN 325 MG: 325 TABLET, COATED ORAL at 08:09

## 2019-09-17 RX ADMIN — SENNOSIDES AND DOCUSATE SODIUM 2 TABLET: 8.6; 5 TABLET ORAL at 08:09

## 2019-09-17 RX ADMIN — PANTOPRAZOLE SODIUM 40 MG: 40 TABLET, DELAYED RELEASE ORAL at 08:09

## 2019-09-17 RX ADMIN — OXYCODONE HYDROCHLORIDE 10 MG: 5 TABLET ORAL at 11:09

## 2019-09-17 RX ADMIN — HYDROMORPHONE HYDROCHLORIDE 1 MG: 1 INJECTION, SOLUTION INTRAMUSCULAR; INTRAVENOUS; SUBCUTANEOUS at 03:09

## 2019-09-17 RX ADMIN — ONDANSETRON 4 MG: 2 INJECTION INTRAMUSCULAR; INTRAVENOUS at 01:09

## 2019-09-17 NOTE — PROGRESS NOTES
09/17/19 0500   Vital Signs   BP (!) 94/0     MD Long made aware of pt VS above, HM3. MD stated he will review pt chart. Will continue to monitor pt and for new orders.

## 2019-09-17 NOTE — PLAN OF CARE
Went to patient's bedside after hearing from nursing that patient had a HR in the 30s-40s. No changes in symptoms. Evaluated by EP yesterday for bradycardic episodes and diaphragm stimulation. Interrogation revealed incorrect reading thought to be secondary to LVAD interference. I personally reviewed telemetry strips during these episodes and HR was consistently 60 BMP. Discussed with nursing that monitor is having inappropriate HR readings. Instructed to call back if there are any other concerns or questions.    Luis Alves MD  HTS

## 2019-09-17 NOTE — SUBJECTIVE & OBJECTIVE
Interval History: Patient had abdominal pain overnight and continues this am. Lasix gtt stopped. Given docusate enema this morning without response. Thinks she will feel better if she is able to have a BM.     Continuous Infusions:   DOBUTamine 2.5 mcg/kg/min (09/16/19 1451)     Scheduled Meds:   aspirin  325 mg Oral Daily    docusate sodium  1 enema Rectal Daily    oxyCODONE  20 mg Oral Q12H    pantoprazole  40 mg Oral Daily    polyethylene glycol  17 g Oral Daily    ramelteon  8 mg Oral QHS    senna-docusate 8.6-50 mg  2 tablet Oral Daily    venlafaxine  150 mg Oral Daily    warfarin  3 mg Oral Daily     PRN Meds:albuterol sulfate, bisacodyl, Dextrose 10% Bolus, Dextrose 10% Bolus, glucagon (human recombinant), glucose, glucose, hydrocortisone, hydrOXYzine HCl, insulin aspart U-100, magnesium hydroxide 400 mg/5 ml, magnesium sulfate IVPB, ondansetron, oxyCODONE, oxyCODONE-acetaminophen, promethazine (PHENERGAN) IVPB, simethicone, sodium chloride 0.9%    Review of patient's allergies indicates:   Allergen Reactions    Adhesive Blisters     Reaction to area in chest and up only    Codeine Itching     Objective:     Vital Signs (Most Recent):  Temp: 97.9 °F (36.6 °C) (09/17/19 0745)  Pulse: (!) 59 (09/17/19 0745)  Resp: 17 (09/17/19 0745)  BP: (!) 90/0 (09/17/19 0745)  SpO2: (!) 94 % (09/17/19 0745) Vital Signs (24h Range):  Temp:  [96.9 °F (36.1 °C)-98.9 °F (37.2 °C)] 97.9 °F (36.6 °C)  Pulse:  [38-61] 59  Resp:  [16-20] 17  SpO2:  [90 %-99 %] 94 %  BP: ()/(0-77) 90/0     Patient Vitals for the past 72 hrs (Last 3 readings):   Weight   09/17/19 0600 106.1 kg (233 lb 14.5 oz)   09/16/19 0500 106 kg (233 lb 11 oz)   09/15/19 1141 105.7 kg (233 lb)     Body mass index is 36.64 kg/m².      Intake/Output Summary (Last 24 hours) at 9/17/2019 0946  Last data filed at 9/17/2019 0600  Gross per 24 hour   Intake 405 ml   Output 1201 ml   Net -796 ml        Physical Exam   Constitutional: She is oriented to  person, place, and time. She appears well-developed and well-nourished.   HENT:   Head: Normocephalic.   Eyes: Pupils are equal, round, and reactive to light.   Neck: Normal range of motion. Neck supple.   Cardiovascular: Normal rate and regular rhythm.   VAD hum   Pulmonary/Chest: Effort normal and breath sounds normal.   Decreased bilaterally.   Abdominal: Soft. Bowel sounds are normal.   Musculoskeletal: Normal range of motion.   Neurological: She is alert and oriented to person, place, and time.   Skin: Skin is warm and dry.   Psychiatric: She has a normal mood and affect. Her behavior is normal.   Nursing note and vitals reviewed.    Significant Labs:  CBC:  Recent Labs   Lab 09/15/19  0919 09/16/19  0244 09/17/19  0905   WBC 7.16 7.32 9.29   RBC 3.64* 3.66* 4.03   HGB 10.4* 10.7* 11.3*   HCT 33.2* 32.9* 38.1    296 370*   MCV 91 90 95   MCH 28.6 29.2 28.0   MCHC 31.3* 32.5 29.7*     BNP:  Recent Labs   Lab 09/11/19  0400 09/13/19  0400 09/16/19  0243   * 987* 522*     CMP:  Recent Labs   Lab 09/13/19  0400  09/15/19  1345 09/16/19  0243 09/16/19  0521   GLU 98   < > 134* 148* 121*   CALCIUM 9.6   < > 9.5 10.2 10.0   ALBUMIN 3.6  --   --  3.3* 3.4*   PROT 6.3  --   --  6.9 6.9      < > 139 138 139   K 3.5   < > 3.2* 3.9 3.8   CO2 25   < > 28 30* 28   CL 98   < > 96 96 97   BUN 31*   < > 27* 30* 31*   CREATININE 1.6*   < > 1.2 1.4 1.3   ALKPHOS 66  --   --  63 65   *  --   --  104* 102*   *  --   --  45* 46*   BILITOT 1.8*  --   --  0.7 0.7    < > = values in this interval not displayed.      Coagulation:   Recent Labs   Lab 09/14/19  0610 09/15/19  0919 09/16/19  0243 09/16/19  0521 09/17/19  0411   INR 1.1 1.5* 2.1*  --   --    APTT 26.9 29.2 33.8* 24.8 39.1*     LDH:  Recent Labs   Lab 09/15/19  0919 09/16/19  0243 09/17/19  0411   * 351* 309*     Microbiology:  Microbiology Results (last 7 days)     Procedure Component Value Units Date/Time    HPV High Risk Genotypes,  PCR [[id number]] Collected:  09/11/19 1330    Order Status:  Completed Updated:  09/16/19 1259     HPV High Risk type 16, PCR Negative     HPV High Risk type 18, PCR Negative     HPV other High Risk types, PCR Negative     Comment: Other HPV genotypes include:   31,33,35,39,45,51,52,56,58,59,66 and 68.         Narrative:       replaces order [id number]    Blood culture [[id number]] Collected:  09/11/19 0810    Order Status:  Completed Specimen:  Blood from Peripheral, Antecubital, Left Updated:  09/16/19 1012     Blood Culture, Routine No growth after 5 days.    Blood culture [[id number]] Collected:  09/11/19 0832    Order Status:  Completed Specimen:  Blood from Line, Jugular, Internal Right Updated:  09/16/19 1012     Blood Culture, Routine No growth after 5 days.    HPV High Risk Genotypes, PCR [[id number]]     Order Status:  Completed     Blood culture [[id number]] Collected:  09/11/19 0820    Order Status:  Sent Specimen:  Blood from Line, Jugular, Internal Left Updated:  09/11/19 0822        I have reviewed all pertinent labs within the past 24 hours.    Estimated Creatinine Clearance: 65.6 mL/min (based on SCr of 1.3 mg/dL).    Diagnostic Results:  I have reviewed and interpreted all pertinent imaging results/findings within the past 24 hours.

## 2019-09-17 NOTE — PROGRESS NOTES
09/17/2019  Trevin Sow    Current provider:  Jolene Lua MD      I, Trevin Sow, rounded on Deborah Navas to ensure all mechanical assist device settings (IABP or VAD) were appropriate and all parameters were within limits.  I was able to ensure all back up equipment was present, the staff had no issues, and the Perfusion Department daily rounding was complete.    7:31 AM

## 2019-09-17 NOTE — PT/OT/SLP PROGRESS
Physical Therapy      Patient Name:  Deborah Navas   MRN:  [id number]    Patient not seen today secondary to (Hold per NP Bety 2* dizziness and nausea). RNs at bedside administering meds. Will follow-up at next scheduled session as able.    Alecia Pace, PT, DPT   9/17/2019  [phone]

## 2019-09-17 NOTE — PLAN OF CARE
Problem: Occupational Therapy Goal  Goal: Occupational Therapy Goal  Goals to be met by: 10/11/19     Patient will increase functional independence with ADLs by performing:    UE Dressing with [place].  LE Dressing with [place].  Grooming while standing at sink with [place].  Toileting from toilet with [place] for hygiene and clothing management.   Bathing from  shower chair/bench with [place].  Toilet transfer to toilet with [place].  Increased functional strength to WFL for B UE.  Upper extremity exercise program x15 reps per handout, with independence.     Cont. POC.

## 2019-09-17 NOTE — PROGRESS NOTES
"  [facility]  Adult Nutrition  Consult Note    SUMMARY     Recommendations    1. Continue current [place] diet, add Optisource ONS to aid in caloric intake.   2. RD to monitor & follow-up.    Goals: Patient to receive nutrition by RD follow-up  Nutrition Goal Status: goal met  Communication of RD Recs: reviewed with RN    Reason for Assessment    Reason For Assessment: RD follow-up  Diagnosis: surgery/postoperative complications(s/p LVAD 9/10)  Relevant Medical History: NICM, HLD  Interdisciplinary Rounds: attended    General Information Comments: Pt continues w/ poor PO intake 2/2 nausea, consumed ~25% of breakfast this AM. Willing to try ONS. S/p LVAD placement 9/10. Physical exam remains unchanged, continues to appear nourished. Low sodium & Coumadin diet education complete, paperwork provided. Pt verbalized understanding.  Nutrition Discharge Planning: Adequate nutrition via PO intake.    Nutrition/Diet History    Patient Reported Diet/Restrictions/Preferences: low salt  Spiritual, Cultural Beliefs, [place]ze Practices, Values that Affect Care: no  Factors Affecting Nutritional Intake: decreased appetite, nausea/vomiting, abdominal pain    Anthropometrics    Temp: 97.9 °F (36.6 °C)  Height Method: Stated  Height: 5' 7" (170.2 cm)  Height (inches): 67 in  Weight Method: Bed Scale  Weight: 106.1 kg (233 lb 14.5 oz)  Weight (lb): 233.91 lb  Ideal Body Weight (IBW), Female: 135 lb  % Ideal Body Weight, Female (lb): 173.27 lb  BMI (Calculated): 36.7  BMI Grade: 35 - 39.9 - obesity - grade II  Usual Body Weight (UBW), k.5 kg  % Usual Body Weight: 99.15    Lab/Procedures/Meds    Pertinent Labs Reviewed: reviewed  Pertinent Labs Comments: BUN 31, GFR 48.3  Pertinent Medications Reviewed: reviewed  Pertinent Medications Comments: Dobutamine, Warfarin    Estimated/Assessed Needs    Weight Used For Calorie Calculations: 106.1 kg (233 lb 14.5 oz)     Energy Calorie Requirements (kcal): 2149 " kcal/d  Energy Need Method: [place]-St Jeor(1.25 PAL)     Protein Requirements: 106 g/d (1 g/kg)  Weight Used For Protein Calculations: 106.1 kg (233 lb 14.5 oz)     Fluid Requirements (mL): 1 mL/kcal or per MD    Nutrition Prescription Ordered    Current Diet Order: [place]    Evaluation of Received Nutrient/Fluid Intake    Comments: LBM: 9/16    Tolerance: tolerating    Nutrition Risk    Level of Risk/Frequency of Follow-up: (1x/week)     Assessment and Plan    Nutrition Problem  Increased nutrient needs     Related to (etiology):   Physiological causes related to healing     Signs and Symptoms (as evidenced by):   S/p LVAD placement 9/10      Interventions (treatment strategy):  Collaboration of nutrition care with other providers     Nutrition Diagnosis Status:   Continues     Monitor and Evaluation    Food and Nutrient Intake: energy intake, food and beverage intake  Food and Nutrient Adminstration: diet order  Knowledge/Beliefs/Attitudes: food and nutrition knowledge/skill  Physical Activity and Function: nutrition-related ADLs and IADLs  Anthropometric Measurements: weight, weight change  Biochemical Data, Medical Tests and Procedures: electrolyte and renal panel, gastrointestinal profile, inflammatory profile  Nutrition-Focused Physical Findings: overall appearance     Nutrition Follow-Up    RD Follow-up?: Yes

## 2019-09-17 NOTE — PHYSICIAN QUERY
PT Name: Deborah Navas  MR #: [id number]  Physician Query Form - CKD Clarification     CDS/: Sadia Leslie RN, CCDS             Contact information: [email or web address]  This form is a permanent document in the medical record.     Query Date: September 17, 2019    By submitting this query, we are merely seeking further clarification of documentation. Please utilize your independent clinical judgment when addressing the question(s) below.    The Medical record contains the following:     Indicators   Supporting Clinical Findings   Location in Medical Record   X CKD or Chronic Kidney (Renal) Failure / Disease CKD    GLO on CKD 9/8 - 9/17 prog notes    9/12 prog note   X BUN/Creatinine                          GFR Cr 1.5->1.6->2.1->1.8->1.7->1.5  1.8->1.4->1.9->1.6->1.3->1.2->1.4    gfr-40.6->37.6-->27.0->32.6->34.9  40.6->32.6->44.1->30.5->37.6->  48.3->53.2->44.1 9/4- 9/16 lab    Dehydration      Nausea / Vomiting      Dialysis / CRRT      Medication      Treatment     X Other Chronic Conditions     X Other Baseline cr around 1.3-1.4 in early 2019    Creatinine at baseline 9/10-9/15 prog notes      9/16-9/17 prog notes     Provider, please further specify the stage of CKD.    [   ] Chronic Kidney Disease (CKD) (please specify stage* below)      [employer] Kid[employer] foundation Definitions     Stage Description eGFR (mL/min)   [   ]   II Mildly reduced kidney function 60-89   [  x ]    III Moderately reduced kidney function 30-59   [   ] Other (please specify): ____________    [   ]  Clinically Undetermined          Please document in your progress notes daily for the duration of treatment until resolved and include in your discharge summary.

## 2019-09-17 NOTE — ASSESSMENT & PLAN NOTE
-HeartMate 3 Implanted 9/10/19 as DT  -HTS Primary  -Implanted by Dr. Walden  -Continue Coumadin, Goal INR 2.0-3.0. Therapeutic today.   -Antiplatelets  mg.  -LDH is stable overall today. Will continue to monitor daily.  -Speed set at 5300, LSL 4900 rpm.  -Not listed for OHTx.  -Continue  @ 2.5 mcg/kg/min  -2D echo performed 9/15/19. Will drop speed to 5200.   -Lasix gtt on hold for now  -Continue Pt/OT/VAD education    Procedure: Device Interrogation Including analysis of device parameters  Current Settings: Ventricular Assist Device  Review of device function is stable/unstable stable    TXP LVAD INTERROGATIONS 9/17/2019 9/17/2019 9/17/2019 9/17/2019 9/16/2019 9/16/2019 9/16/2019   Type HeartMate3 HeartMate3 HeartMate3 HeartMate3 HeartMate3 HeartMate3 HeartMate3   Flow 3.3 4.0 3.4 3.7 3.6 3.9 3.7   Speed 5200 5300 5300 5400 5300 5300 5300   PI 5.0 4.2 5.6 4.6 4.7 4.9 4.3   Power ([place]) 3.6 3.7 3.6 3.7 3.6 3.7 3.7   LSL 4800 - - - - - -   Pulsatility - - - - - - -

## 2019-09-17 NOTE — NURSING
Pt lying in bed, no family at bedside. Pt states she had a bad weekend and did not do any of her education. Pt c/o abdominal pain today- has not had a BM- RN states that pt will be getting an enema today.    Encouraged pt to begin working on education this evening.

## 2019-09-17 NOTE — NURSING
Pt lying in bedside chair, c/o chest and back pain. Pt will not sit up in the chair, prefers to lie d/t pain. Pt has not started HM3 education. Pt states she is changing power sources with RN and PT. She is also aware of how to perform a self test and why a self test is performed.   I urged patient to start working on her workbook because not being educated will delay her discharge significantly. Pt verbalizes understanding.  HM3 videos left playing on computer.

## 2019-09-17 NOTE — NURSING
LVAD soap and water dressing change completed using sterile technique. No redness, tenderness, drainage, or swelling noted. Suture remains intact. DLES 1. Patient tolerated well.

## 2019-09-17 NOTE — PROGRESS NOTES
[facility]  Heart Transplant  Progress Note    Patient Name: Deborah Navas  MRN: [id number]  Admission Date: 9/4/2019  Hospital Length of Stay: 13 days  Attending Physician: Jolene Lua MD  Primary Care Provider: Primary Doctor No  Principal Problem:LVAD (left ventricular assist device) present    Subjective:     Interval History: Patient had abdominal pain overnight and continues this am. Lasix gtt stopped. Given docusate enema this morning without response. Thinks she will feel better if she is able to have a BM.     Continuous Infusions:   DOBUTamine 2.5 mcg/kg/min (09/16/19 1451)     Scheduled Meds:   aspirin  325 mg Oral Daily    docusate sodium  1 enema Rectal Daily    oxyCODONE  20 mg Oral Q12H    pantoprazole  40 mg Oral Daily    polyethylene glycol  17 g Oral Daily    ramelteon  8 mg Oral QHS    senna-docusate 8.6-50 mg  2 tablet Oral Daily    venlafaxine  150 mg Oral Daily    warfarin  3 mg Oral Daily     PRN Meds:albuterol sulfate, bisacodyl, Dextrose 10% Bolus, Dextrose 10% Bolus, glucagon (human recombinant), glucose, glucose, hydrocortisone, hydrOXYzine HCl, insulin aspart U-100, magnesium hydroxide 400 mg/5 ml, magnesium sulfate IVPB, ondansetron, oxyCODONE, oxyCODONE-acetaminophen, promethazine (PHENERGAN) IVPB, simethicone, sodium chloride 0.9%    Review of patient's allergies indicates:   Allergen Reactions    Adhesive Blisters     Reaction to area in chest and up only    Codeine Itching     Objective:     Vital Signs (Most Recent):  Temp: 97.9 °F (36.6 °C) (09/17/19 0745)  Pulse: (!) 59 (09/17/19 0745)  Resp: 17 (09/17/19 0745)  BP: (!) 90/0 (09/17/19 0745)  SpO2: (!) 94 % (09/17/19 0745) Vital Signs (24h Range):  Temp:  [96.9 °F (36.1 °C)-98.9 °F (37.2 °C)] 97.9 °F (36.6 °C)  Pulse:  [38-61] 59  Resp:  [16-20] 17  SpO2:  [90 %-99 %] 94 %  BP: ()/(0-77) 90/0     Patient Vitals for the past 72 hrs (Last 3 readings):   Weight   09/17/19 0600 106.1  kg (233 lb 14.5 oz)   09/16/19 0500 106 kg (233 lb 11 oz)   09/15/19 1141 105.7 kg (233 lb)     Body mass index is 36.64 kg/m².      Intake/Output Summary (Last 24 hours) at 9/17/2019 0946  Last data filed at 9/17/2019 0600  Gross per 24 hour   Intake 405 ml   Output 1201 ml   Net -796 ml        Physical Exam   Constitutional: She is oriented to person, place, and time. She appears well-developed and well-nourished.   HENT:   Head: Normocephalic.   Eyes: Pupils are equal, round, and reactive to light.   Neck: Normal range of motion. Neck supple.   Cardiovascular: Normal rate and regular rhythm.   VAD hum   Pulmonary/Chest: Effort normal and breath sounds normal.   Decreased bilaterally.   Abdominal: Soft. Bowel sounds are normal.   Musculoskeletal: Normal range of motion.   Neurological: She is alert and oriented to person, place, and time.   Skin: Skin is warm and dry.   Psychiatric: She has a normal mood and affect. Her behavior is normal.   Nursing note and vitals reviewed.    Significant Labs:  CBC:  Recent Labs   Lab 09/15/19  0919 09/16/19  0244 09/17/19  0905   WBC 7.16 7.32 9.29   RBC 3.64* 3.66* 4.03   HGB 10.4* 10.7* 11.3*   HCT 33.2* 32.9* 38.1    296 370*   MCV 91 90 95   MCH 28.6 29.2 28.0   MCHC 31.3* 32.5 29.7*     BNP:  Recent Labs   Lab 09/11/19  0400 09/13/19  0400 09/16/19  0243   * 987* 522*     CMP:  Recent Labs   Lab 09/13/19  0400  09/15/19  1345 09/16/19  0243 09/16/19  0521   GLU 98   < > 134* 148* 121*   CALCIUM 9.6   < > 9.5 10.2 10.0   ALBUMIN 3.6  --   --  3.3* 3.4*   PROT 6.3  --   --  6.9 6.9      < > 139 138 139   K 3.5   < > 3.2* 3.9 3.8   CO2 25   < > 28 30* 28   CL 98   < > 96 96 97   BUN 31*   < > 27* 30* 31*   CREATININE 1.6*   < > 1.2 1.4 1.3   ALKPHOS 66  --   --  63 65   *  --   --  104* 102*   *  --   --  45* 46*   BILITOT 1.8*  --   --  0.7 0.7    < > = values in this interval not displayed.      Coagulation:   Recent Labs   Lab  09/14/19  0610 09/15/19  0919 09/16/19  0243 09/16/19  0521 09/17/19  0411   INR 1.1 1.5* 2.1*  --   --    APTT 26.9 29.2 33.8* 24.8 39.1*     LDH:  Recent Labs   Lab 09/15/19  0919 09/16/19  0243 09/17/19  0411   * 351* 309*     Microbiology:  Microbiology Results (last 7 days)     Procedure Component Value Units Date/Time    HPV High Risk Genotypes, PCR [[id number]] Collected:  09/11/19 1330    Order Status:  Completed Updated:  09/16/19 1259     HPV High Risk type 16, PCR Negative     HPV High Risk type 18, PCR Negative     HPV other High Risk types, PCR Negative     Comment: Other HPV genotypes include:   31,33,35,39,45,51,52,56,58,59,66 and 68.         Narrative:       replaces order [id number]    Blood culture [[id number]] Collected:  09/11/19 0810    Order Status:  Completed Specimen:  Blood from Peripheral, Antecubital, Left Updated:  09/16/19 1012     Blood Culture, Routine No growth after 5 days.    Blood culture [[id number]] Collected:  09/11/19 0832    Order Status:  Completed Specimen:  Blood from Line, Jugular, Internal Right Updated:  09/16/19 1012     Blood Culture, Routine No growth after 5 days.    HPV High Risk Genotypes, PCR [[id number]]     Order Status:  Completed     Blood culture [[id number]] Collected:  09/11/19 0820    Order Status:  Sent Specimen:  Blood from Line, Jugular, Internal Left Updated:  09/11/19 0822        I have reviewed all pertinent labs within the past 24 hours.    Estimated Creatinine Clearance: 65.6 mL/min (based on SCr of 1.3 mg/dL).    Diagnostic Results:  I have reviewed and interpreted all pertinent imaging results/findings within the past 24 hours.    Assessment and Plan:     No notes on file    * LVAD (left ventricular assist device) present  -HeartMate 3 Implanted 9/10/19 as DT  -HTS Primary  -Implanted by Dr. Walden  -Continue Coumadin, Goal INR 2.0-3.0. Therapeutic today.   -Antiplatelets  mg.  -LDH is stable overall today. Will continue to monitor  daily.  -Speed set at 5300, LSL 4900 rpm.  -Not listed for OHTx.  -Continue  @ 2.5 mcg/kg/min  -2D echo performed 9/15/19. Will drop speed to 5200.   -Lasix gtt on hold for now  -Continue Pt/OT/VAD education    Procedure: Device Interrogation Including analysis of device parameters  Current Settings: Ventricular Assist Device  Review of device function is stable/unstable stable    TXP LVAD INTERROGATIONS 9/17/2019 9/17/2019 9/17/2019 9/17/2019 9/16/2019 9/16/2019 9/16/2019   Type HeartMate3 HeartMate3 HeartMate3 HeartMate3 HeartMate3 HeartMate3 HeartMate3   Flow 3.3 4.0 3.4 3.7 3.6 3.9 3.7   Speed 5200 5300 5300 5400 5300 5300 5300   PI 5.0 4.2 5.6 4.6 4.7 4.9 4.3   Power (Orellana) 3.6 3.7 3.6 3.7 3.6 3.7 3.7   LSL 4800 - - - - - -   Pulsatility - - - - - - -       Pleural effusion  -Bilateral pleural effusions present, L>R  -Will review with staff and CTS, may need speed change?    CKD (chronic kidney disease)  -Creatinine at baseline  - Avoid nephrotoxic agents    Abnormal CT scan  -focal opacity found at right base.  DDx: atelectasis.  Recommending repeat CT in 3 months    Enlarged thyroid  Ultrasound done and showed enlarged multinodular thyroid which warrants surveillance in one year.   - TSH and fT4 wnl    History of ventricular tachycardia  -In the setting of hypokalemia  -Monitor electrolytes closely    Acute on chronic combined systolic and diastolic heart failure  NICM EF 20% s/p LVAD 9/10  RHC: done on admit 9/4/19 RA: 20/ 20/ 18 RV: 60/ 8/ 18 PA: 60/ 32/ 45 PWP: 43/ 70/ 40 . Cardiac output was 2.27 by Fitz. Cardiac index is 1.04 L/min/m2. O2 Sat: PA 34%. AO 95% PVR 2.2 PALMER    -see s/p LVAD      Ventricular tachyarrhythmia  - On amio at home, hold for now (see above)   - Goal K >4 and Mg >2  - Monitor on telemetry    ICD (implantable cardioverter-defibrillator) in place  -Medtronic CRT-D  -Patient began v-pacing at 60 and 9/14/19 began having phrenic nerve stimulation. Amiodarone held and EP consulted,  device settings changed and PNS ceased.   -Underlying rhythm a flutter with slow ventricular response at 32 bpm      Americo Albert NP  Heart Transplant  [facility]

## 2019-09-17 NOTE — PLAN OF CARE
Problem: Adult Inpatient Plan of Care  Goal: Plan of Care Review  Outcome: Ongoing (interventions implemented as appropriate)  Pt free of falls and injury during shift. POC reviewed with pt VS stable and AAox4. HR 50s On telemetry, pt flat in bed, easily arousable. Wound Vac inact with no drainage.  Fonseca intact. Dobutamine infusing. CT dressing CDI. PRN pain and nausea med admin. 5L NC. LVAD numbers WNL and no alarms.  No acute events noted at this time. No complaints. Educated pt why she is at risk for falls and to use call light for assistance ambulating. Yellow non-slip socks on pt. Bed low and locked, call light with in reach. Will continue to monitor.

## 2019-09-17 NOTE — NURSING
Patient up to chair with RN and PCT assist. She reported nausea and dizziness as she moved but was able to stand after resting on the side of the bed. MAP 80 prior to getting up. Patient reported consistent nausea and lightheadedness while getting up to the chair but tolerated well. PRN Phenergan administered after getting patient settled in the chair. Patient resting in chair with legs elevated. Will continue to monitor.

## 2019-09-17 NOTE — PT/OT/SLP PROGRESS
Occupational Therapy   Treatment    Name: Deborah Navas  MRN: [id number]  Admitting Diagnosis:  LVAD (left ventricular assist device) present  7 Days Post-Op    Recommendations:     Discharge Recommendations: home health OT  Discharge Equipment Recommendations:  none  Barriers to discharge:  None    Assessment:     Deborah Navas is a 48 y.o. female with a medical diagnosis of LVAD (left ventricular assist device) present.  She was able to perform supine/sit T/F c min A.  Was able to perform self-test c mod I and switch over from power base to battery c min A.  After sitting up for approx. 10 minutes pt became very dizzy and lightheaded and had to return to supine. Performance deficits affecting function are weakness, impaired endurance, impaired self care skills, impaired functional mobilty, decreased upper extremity function, impaired cardiopulmonary response to activity.     Rehab Prognosis:  Good; patient would benefit from acute skilled OT services to address these deficits and reach maximum level of function.       Plan:     Patient to be seen 6 x/week to address the above listed problems via self-care/home management, therapeutic activities, therapeutic exercises  · Plan of Care Expires: 10/11/19  · Plan of Care Reviewed with: patient    Subjective     Pain/Comfort:  · Pain Rating 1: 0/10    Objective:     Communicated with: RN prior to session.  Patient found supine with blood pressure cuff, LVAD, peripheral IV, pulse ox (continuous), telemetry, wound vac upon OT entry to room.    General Precautions: Standard, fall, LVAD, sternal   Orthopedic Precautions:N/A   Braces: N/A     Occupational Performance:     Bed Mobility:    · Patient completed Supine to Sit with minimum assistance  · Patient completed Sit to Supine with minimum assistance     Functional Mobility/Transfers:    · Functional Mobility: Unable to stand 2* lightheadedness and dizziness.  RN notified.    Activities of Daily  Living:  · Upper Body Dressing: minimum assistance to switch over from power module to battery and don consolidation bag.  Pt was able to perform self-test c mod I.      AMPAC 6 Click ADL: 14        Patient left supine with all lines intact, call button in reach, RN\ notified and RN presentEducation:      GOALS:   Multidisciplinary Problems     Occupational Therapy Goals        Problem: Occupational Therapy Goal    Goal Priority Disciplines Outcome Interventions   Occupational Therapy Goal     OT, PT/OT     Description:  Goals to be met by: 10/11/19     Patient will increase functional independence with ADLs by performing:    UE Dressing with Starr.  LE Dressing with Starr.  Grooming while standing at sink with Starr.  Toileting from toilet with Starr for hygiene and clothing management.   Bathing from  shower chair/bench with Starr.  Toilet transfer to toilet with Starr.  Increased functional strength to WFL for B UE.  Upper extremity exercise program x15 reps per handout, with independence.                       Time Tracking:     OT Date of Treatment: 09/17/19  OT Start Time: 0830  OT Stop Time: 0915  OT Total Time (min): 45 min    Billable Minutes:Self Care/Home Management 15  Therapeutic Activity 30    JAGJIT Cordero  9/17/2019

## 2019-09-18 PROBLEM — R73.9 ACUTE HYPERGLYCEMIA: Status: RESOLVED | Noted: 2019-09-10 | Resolved: 2019-09-18

## 2019-09-18 LAB
ALBUMIN SERPL BCP-MCNC: 3.1 G/DL (ref 3.5–5.2)
ALP SERPL-CCNC: 62 U/L (ref 55–135)
ALT SERPL W/O P-5'-P-CCNC: 50 U/L (ref 10–44)
ANION GAP SERPL CALC-SCNC: 12 MMOL/L (ref 8–16)
APTT BLDCRRT: 42 SEC (ref 21–32)
ASCENDING AORTA: 3.38 CM
AST SERPL-CCNC: 18 U/L (ref 10–40)
BASOPHILS # BLD AUTO: 0.03 K/UL (ref 0–0.2)
BASOPHILS NFR BLD: 0.4 % (ref 0–1.9)
BILIRUB SERPL-MCNC: 0.7 MG/DL (ref 0.1–1)
BSA FOR ECHO PROCEDURE: 2.24 M2
BUN SERPL-MCNC: 27 MG/DL (ref 6–20)
CALCIUM SERPL-MCNC: 9.5 MG/DL (ref 8.7–10.5)
CHLORIDE SERPL-SCNC: 95 MMOL/L (ref 95–110)
CO2 SERPL-SCNC: 30 MMOL/L (ref 23–29)
CREAT SERPL-MCNC: 1.1 MG/DL (ref 0.5–1.4)
CV ECHO LV RWT: 0.28 CM
DIFFERENTIAL METHOD: ABNORMAL
DOP CALC LVOT AREA: 3.3 CM2
DOP CALC LVOT DIAMETER: 2.04 CM
E WAVE DECELERATION TIME: 148.13 MSEC
E/A RATIO: 1.66
ECHO LV POSTERIOR WALL: 0.79 CM (ref 0.6–1.1)
EOSINOPHIL # BLD AUTO: 0.3 K/UL (ref 0–0.5)
EOSINOPHIL NFR BLD: 4 % (ref 0–8)
ERYTHROCYTE [DISTWIDTH] IN BLOOD BY AUTOMATED COUNT: 14.9 % (ref 11.5–14.5)
EST. GFR  (AFRICAN AMERICAN): >60 ML/MIN/1.73 M^2
EST. GFR  (NON AFRICAN AMERICAN): 59.1 ML/MIN/1.73 M^2
FRACTIONAL SHORTENING: 12 % (ref 28–44)
GLUCOSE SERPL-MCNC: 113 MG/DL (ref 70–110)
HCT VFR BLD AUTO: 33.1 % (ref 37–48.5)
HGB BLD-MCNC: 10.2 G/DL (ref 12–16)
IMM GRANULOCYTES # BLD AUTO: 0.09 K/UL (ref 0–0.04)
IMM GRANULOCYTES NFR BLD AUTO: 1.1 % (ref 0–0.5)
INR PPP: 4.2 (ref 0.8–1.2)
INTERVENTRICULAR SEPTUM: 0.77 CM (ref 0.6–1.1)
LA MAJOR: 5.3 CM
LA MINOR: 4.99 CM
LA WIDTH: 3.37 CM
LDH SERPL L TO P-CCNC: 279 U/L (ref 110–260)
LEFT ATRIUM SIZE: 2.77 CM
LEFT ATRIUM VOLUME INDEX: 18.9 ML/M2
LEFT ATRIUM VOLUME: 40.79 CM3
LEFT INTERNAL DIMENSION IN SYSTOLE: 5.01 CM (ref 2.1–4)
LEFT VENTRICLE DIASTOLIC VOLUME INDEX: 73.89 ML/M2
LEFT VENTRICLE DIASTOLIC VOLUME: 159.42 ML
LEFT VENTRICLE MASS INDEX: 76 G/M2
LEFT VENTRICLE SYSTOLIC VOLUME INDEX: 55.1 ML/M2
LEFT VENTRICLE SYSTOLIC VOLUME: 118.88 ML
LEFT VENTRICULAR INTERNAL DIMENSION IN DIASTOLE: 5.69 CM (ref 3.5–6)
LEFT VENTRICULAR MASS: 164.39 G
LV LATERAL E/E' RATIO: 14.6 M/S
LYMPHOCYTES # BLD AUTO: 0.8 K/UL (ref 1–4.8)
LYMPHOCYTES NFR BLD: 9.2 % (ref 18–48)
MAGNESIUM SERPL-MCNC: 2.1 MG/DL (ref 1.6–2.6)
MCH RBC QN AUTO: 28.3 PG (ref 27–31)
MCHC RBC AUTO-ENTMCNC: 30.8 G/DL (ref 32–36)
MCV RBC AUTO: 92 FL (ref 82–98)
MONOCYTES # BLD AUTO: 0.9 K/UL (ref 0.3–1)
MONOCYTES NFR BLD: 10.7 % (ref 4–15)
MV PEAK A VEL: 0.44 M/S
MV PEAK E VEL: 0.73 M/S
NEUTROPHILS # BLD AUTO: 6.4 K/UL (ref 1.8–7.7)
NEUTROPHILS NFR BLD: 74.6 % (ref 38–73)
NRBC BLD-RTO: 0 /100 WBC
PISA TR MAX VEL: 1.98 M/S
PLATELET # BLD AUTO: 365 K/UL (ref 150–350)
PMV BLD AUTO: 9.3 FL (ref 9.2–12.9)
POCT GLUCOSE: 110 MG/DL (ref 70–110)
POTASSIUM SERPL-SCNC: 3.9 MMOL/L (ref 3.5–5.1)
PREALB SERPL-MCNC: 12 MG/DL (ref 20–43)
PROT SERPL-MCNC: 6.6 G/DL (ref 6–8.4)
PROTHROMBIN TIME: 40.7 SEC (ref 9–12.5)
RA MAJOR: 4.95 CM
RA PRESSURE: 15 MMHG
RA WIDTH: 3.35 CM
RBC # BLD AUTO: 3.61 M/UL (ref 4–5.4)
RIGHT VENTRICULAR END-DIASTOLIC DIMENSION: 4.6 CM
SINUS: 3.02 CM
SODIUM SERPL-SCNC: 137 MMOL/L (ref 136–145)
STJ: 2.94 CM
TDI LATERAL: 0.05 M/S
TR MAX PG: 16 MMHG
TRICUSPID ANNULAR PLANE SYSTOLIC EXCURSION: 0.86 CM
TV REST PULMONARY ARTERY PRESSURE: 31 MMHG
WBC # BLD AUTO: 8.56 K/UL (ref 3.9–12.7)

## 2019-09-18 PROCEDURE — 99231 PR SUBSEQUENT HOSPITAL CARE,LEVL I: ICD-10-PCS | Mod: NTX,,, | Performed by: NURSE PRACTITIONER

## 2019-09-18 PROCEDURE — 99233 PR SUBSEQUENT HOSPITAL CARE,LEVL III: ICD-10-PCS | Mod: NTX,,, | Performed by: INTERNAL MEDICINE

## 2019-09-18 PROCEDURE — 97530 THERAPEUTIC ACTIVITIES: CPT | Mod: NTX

## 2019-09-18 PROCEDURE — 25000003 PHARM REV CODE 250: Mod: NTX | Performed by: STUDENT IN AN ORGANIZED HEALTH CARE EDUCATION/TRAINING PROGRAM

## 2019-09-18 PROCEDURE — 99233 SBSQ HOSP IP/OBS HIGH 50: CPT | Mod: NTX,,, | Performed by: INTERNAL MEDICINE

## 2019-09-18 PROCEDURE — 25000003 PHARM REV CODE 250: Mod: NTX | Performed by: INTERNAL MEDICINE

## 2019-09-18 PROCEDURE — 25000003 PHARM REV CODE 250: Mod: NTX | Performed by: PHYSICIAN ASSISTANT

## 2019-09-18 PROCEDURE — 85025 COMPLETE CBC W/AUTO DIFF WBC: CPT | Mod: NTX

## 2019-09-18 PROCEDURE — 80053 COMPREHEN METABOLIC PANEL: CPT | Mod: NTX

## 2019-09-18 PROCEDURE — 93750 PR INTERROGATE VENT ASSIST DEV, IN PERSON, W PHYSICIAN ANALYSIS: ICD-10-PCS | Mod: NTX,,, | Performed by: INTERNAL MEDICINE

## 2019-09-18 PROCEDURE — 85610 PROTHROMBIN TIME: CPT | Mod: NTX

## 2019-09-18 PROCEDURE — 25000003 PHARM REV CODE 250: Mod: NTX | Performed by: NURSE PRACTITIONER

## 2019-09-18 PROCEDURE — 25000003 PHARM REV CODE 250: Mod: NTX | Performed by: THORACIC SURGERY (CARDIOTHORACIC VASCULAR SURGERY)

## 2019-09-18 PROCEDURE — 83615 LACTATE (LD) (LDH) ENZYME: CPT | Mod: NTX

## 2019-09-18 PROCEDURE — 27000248 HC VAD-ADDITIONAL DAY: Mod: NTX

## 2019-09-18 PROCEDURE — 99231 SBSQ HOSP IP/OBS SF/LOW 25: CPT | Mod: NTX,,, | Performed by: NURSE PRACTITIONER

## 2019-09-18 PROCEDURE — 36415 COLL VENOUS BLD VENIPUNCTURE: CPT | Mod: NTX

## 2019-09-18 PROCEDURE — 93750 INTERROGATION VAD IN PERSON: CPT | Mod: NTX,,, | Performed by: INTERNAL MEDICINE

## 2019-09-18 PROCEDURE — 20600001 HC STEP DOWN PRIVATE ROOM: Mod: NTX

## 2019-09-18 PROCEDURE — 85730 THROMBOPLASTIN TIME PARTIAL: CPT | Mod: NTX

## 2019-09-18 PROCEDURE — 83735 ASSAY OF MAGNESIUM: CPT | Mod: NTX

## 2019-09-18 RX ORDER — SIMETHICONE 80 MG
1 TABLET,CHEWABLE ORAL 3 TIMES DAILY PRN
Status: DISCONTINUED | OUTPATIENT
Start: 2019-09-18 | End: 2019-10-01 | Stop reason: HOSPADM

## 2019-09-18 RX ORDER — SIMETHICONE 80 MG
1 TABLET,CHEWABLE ORAL
Status: DISCONTINUED | OUTPATIENT
Start: 2019-09-18 | End: 2019-10-01 | Stop reason: HOSPADM

## 2019-09-18 RX ORDER — OXYCODONE HCL 20 MG/1
20 TABLET, FILM COATED, EXTENDED RELEASE ORAL ONCE
Status: COMPLETED | OUTPATIENT
Start: 2019-09-18 | End: 2019-09-18

## 2019-09-18 RX ORDER — DOCUSATE SODIUM 283 MG/5ML
1 LIQUID RECTAL ONCE
Status: COMPLETED | OUTPATIENT
Start: 2019-09-18 | End: 2019-09-18

## 2019-09-18 RX ORDER — OXYCODONE HCL 20 MG/1
40 TABLET, FILM COATED, EXTENDED RELEASE ORAL EVERY 12 HOURS
Status: DISCONTINUED | OUTPATIENT
Start: 2019-09-18 | End: 2019-09-19

## 2019-09-18 RX ADMIN — OXYCODONE HYDROCHLORIDE 20 MG: 20 TABLET, FILM COATED, EXTENDED RELEASE ORAL at 08:09

## 2019-09-18 RX ADMIN — SIMETHICONE CHEW TAB 80 MG 80 MG: 80 TABLET ORAL at 08:09

## 2019-09-18 RX ADMIN — OXYCODONE HYDROCHLORIDE 20 MG: 20 TABLET, FILM COATED, EXTENDED RELEASE ORAL at 10:09

## 2019-09-18 RX ADMIN — DOCUSATE SODIUM 1 ENEMA: 283 LIQUID RECTAL at 12:09

## 2019-09-18 RX ADMIN — POLYETHYLENE GLYCOL 3350 17 G: 17 POWDER, FOR SOLUTION ORAL at 08:09

## 2019-09-18 RX ADMIN — SIMETHICONE CHEW TAB 80 MG 80 MG: 80 TABLET ORAL at 09:09

## 2019-09-18 RX ADMIN — ASPIRIN 325 MG: 325 TABLET, COATED ORAL at 08:09

## 2019-09-18 RX ADMIN — PANTOPRAZOLE SODIUM 40 MG: 40 TABLET, DELAYED RELEASE ORAL at 08:09

## 2019-09-18 RX ADMIN — SENNOSIDES AND DOCUSATE SODIUM 2 TABLET: 8.6; 5 TABLET ORAL at 09:09

## 2019-09-18 RX ADMIN — SENNOSIDES AND DOCUSATE SODIUM 2 TABLET: 8.6; 5 TABLET ORAL at 08:09

## 2019-09-18 RX ADMIN — OXYCODONE HYDROCHLORIDE 40 MG: 20 TABLET, FILM COATED, EXTENDED RELEASE ORAL at 09:09

## 2019-09-18 RX ADMIN — SIMETHICONE CHEW TAB 80 MG 80 MG: 80 TABLET ORAL at 03:09

## 2019-09-18 RX ADMIN — VENLAFAXINE 150 MG: 37.5 TABLET ORAL at 08:09

## 2019-09-18 RX ADMIN — RAMELTEON 8 MG: 8 TABLET ORAL at 10:09

## 2019-09-18 RX ADMIN — SIMETHICONE CHEW TAB 80 MG 80 MG: 80 TABLET ORAL at 10:09

## 2019-09-18 RX ADMIN — OXYCODONE HYDROCHLORIDE AND ACETAMINOPHEN 1 TABLET: 5; 325 TABLET ORAL at 03:09

## 2019-09-18 NOTE — PROGRESS NOTES
09/18/2019  Trevin Sow    Current provider:  Jolene Lua MD      I, Trevin Sow, rounded on Deborah [place] to ensure all mechanical assist device settings (IABP or VAD) were appropriate and all parameters were within limits.  I was able to ensure all back up equipment was present, the staff had no issues, and the Perfusion Department daily rounding was complete.    7:02 AM

## 2019-09-18 NOTE — ASSESSMENT & PLAN NOTE
-HeartMate 3 Implanted 9/10/19 as DT  -HTS Primary  -Implanted by Dr. Walden  -Continue Coumadin, Goal INR 2.0-3.0. Therapeutic today.   -Antiplatelets  mg.  -LDH is stable overall today. Will continue to monitor daily.  -Speed set at 5200  -Not listed for OHTx.  -Stop  @ 2.5 mcg/kg/min  -2D echo performed 9/15/19. Will repeat at lower speed.   -Lasix gtt on hold for now  -Continue Pt/OT/VAD education    Procedure: Device Interrogation Including analysis of device parameters  Current Settings: Ventricular Assist Device  Review of device function is stable/unstable stable    TXP LVAD INTERROGATIONS 9/18/2019 9/18/2019 9/17/2019 9/17/2019 9/17/2019 9/17/2019 9/17/2019   Type HeartMate3 HeartMate3 HeartMate3 HeartMate3 HeartMate3 HeartMate3 HeartMate3   Flow 3.7 3.3 3.5 3.5 3.2 3.3 4.0   Speed 5200 5200 5200 5250 5200 5200 5300   PI 5.0 5.2 4.7 6.0 5.4 5.0 4.2   Power (Orellana) 3.6 3.7 3.6 3.6 3.6 3.6 3.7   LSL 4800 - - - 4800 4800 -   Pulsatility Intermittent pulse - - - - - -

## 2019-09-18 NOTE — PT/OT/SLP PROGRESS
Physical Therapy      Patient Name:  Deborah Navas   MRN:  [id number]    Patient not seen today secondary to (Attempted x3 this date. RN requested hold at AM attempt 2* pt just having received suppository. Pt having BM at 2nd attempt. Pt off floor at Echo at 3rd attempt.). Will follow-up at next scheduled session as able.    Alecia Pace, PT, DPT   9/18/2019  [phone]

## 2019-09-18 NOTE — SUBJECTIVE & OBJECTIVE
Interval History: Still feeling dizzy when she sits up. Also feels bloated and is having sternal pain.     Continuous Infusions:   DOBUTamine 2.5 mcg/kg/min (09/17/19 2317)     Scheduled Meds:   aspirin  325 mg Oral Daily    oxyCODONE  40 mg Oral Q12H    pantoprazole  40 mg Oral Daily    polyethylene glycol  17 g Oral Daily    ramelteon  8 mg Oral QHS    senna-docusate 8.6-50 mg  2 tablet Oral BID    simethicone  1 tablet Oral QID (PC + HS)    venlafaxine  150 mg Oral Daily     PRN Meds:albuterol sulfate, bisacodyl, Dextrose 10% Bolus, Dextrose 10% Bolus, docusate sodium, glucagon (human recombinant), glucose, glucose, hydrocortisone, hydrOXYzine HCl, insulin aspart U-100, magnesium hydroxide 400 mg/5 ml, magnesium sulfate IVPB, ondansetron, oxyCODONE, oxyCODONE-acetaminophen, promethazine (PHENERGAN) IVPB, sodium chloride 0.9%    Review of patient's allergies indicates:   Allergen Reactions    Adhesive Blisters     Reaction to area in chest and up only    Codeine Itching     Objective:     Vital Signs (Most Recent):  Temp: 97.8 °F (36.6 °C) (09/18/19 0812)  Pulse: 61 (09/18/19 0812)  Resp: 20 (09/18/19 0812)  BP: (!) 86/0 (09/18/19 0812)  SpO2: (!) 94 % (09/18/19 0812) Vital Signs (24h Range):  Temp:  [97.6 °F (36.4 °C)-98.7 °F (37.1 °C)] 97.8 °F (36.6 °C)  Pulse:  [47-95] 61  Resp:  [16-20] 20  SpO2:  [94 %-97 %] 94 %  BP: (86-98)/(0-72) 86/0     Patient Vitals for the past 72 hrs (Last 3 readings):   Weight   09/17/19 1000 106.1 kg (233 lb 14.5 oz)   09/17/19 0600 106.1 kg (233 lb 14.5 oz)   09/16/19 0500 106 kg (233 lb 11 oz)     Body mass index is 36.64 kg/m².      Intake/Output Summary (Last 24 hours) at 9/18/2019 1035  Last data filed at 9/18/2019 0600  Gross per 24 hour   Intake 210 ml   Output 750 ml   Net -540 ml        Telemetry: Aflutter.     Physical Exam   Constitutional: She is oriented to person, place, and time. She appears well-developed and well-nourished.   HENT:   Head: Normocephalic.    Eyes: Pupils are equal, round, and reactive to light.   Neck: Normal range of motion. Neck supple.   Cardiovascular: Normal rate and regular rhythm.   VAD hum   Pulmonary/Chest: Effort normal and breath sounds normal.   Abdominal: Soft. Bowel sounds are normal.   Musculoskeletal: Normal range of motion.   Neurological: She is alert and oriented to person, place, and time.   Skin: Skin is warm and dry.   Psychiatric: She has a normal mood and affect. Her behavior is normal.   Nursing note and vitals reviewed.    Significant Labs:  CBC:  Recent Labs   Lab 09/16/19  0244 09/17/19  0905 09/18/19 0318   WBC 7.32 9.29 8.56   RBC 3.66* 4.03 3.61*   HGB 10.7* 11.3* 10.2*   HCT 32.9* 38.1 33.1*    370* 365*   MCV 90 95 92   MCH 29.2 28.0 28.3   MCHC 32.5 29.7* 30.8*     BNP:  Recent Labs   Lab 09/13/19  0400 09/16/19  0243   * 522*     CMP:  Recent Labs   Lab 09/16/19  0521 09/17/19  0904 09/18/19 0319   * 172* 113*   CALCIUM 10.0 9.9 9.5   ALBUMIN 3.4* 3.3* 3.1*   PROT 6.9 7.4 6.6    136 137   K 3.8 4.3 3.9   CO2 28 23 30*   CL 97 96 95   BUN 31* 28* 27*   CREATININE 1.3 1.4 1.1   ALKPHOS 65 67 62   * 71* 50*   AST 46* 30 18   BILITOT 0.7 0.7 0.7      Coagulation:   Recent Labs   Lab 09/16/19  0243 09/16/19  0521 09/17/19  0411 09/17/19  0904 09/18/19 0319   INR 2.1*  --   --  3.4* 4.2*   APTT 33.8* 24.8 39.1*  --  42.0*     LDH:  Recent Labs   Lab 09/16/19  0243 09/17/19  0411 09/18/19  0319   * 309* 279*     Microbiology:  Microbiology Results (last 7 days)     Procedure Component Value Units Date/Time    HPV High Risk Genotypes, PCR [[id number]] Collected:  09/11/19 1330    Order Status:  Completed Updated:  09/16/19 1259     HPV High Risk type 16, PCR Negative     HPV High Risk type 18, PCR Negative     HPV other High Risk types, PCR Negative     Comment: Other HPV genotypes include:   31,33,35,39,45,51,52,56,58,59,66 and 68.         Narrative:       replaces order [id number]     Blood culture [[id number]] Collected:  09/11/19 0810    Order Status:  Completed Specimen:  Blood from Peripheral, Antecubital, Left Updated:  09/16/19 1012     Blood Culture, Routine No growth after 5 days.    Blood culture [[id number]] Collected:  09/11/19 0832    Order Status:  Completed Specimen:  Blood from Line, Jugular, Internal Right Updated:  09/16/19 1012     Blood Culture, Routine No growth after 5 days.    HPV High Risk Genotypes, PCR [[id number]]     Order Status:  Completed         I have reviewed all pertinent labs within the past 24 hours.    Estimated Creatinine Clearance: 77.5 mL/min (based on SCr of 1.1 mg/dL).    Diagnostic Results:  I have reviewed all pertinent imaging results/findings within the past 24 hours.

## 2019-09-18 NOTE — PROGRESS NOTES
[facility]  Heart Transplant  Progress Note    Patient Name: Deborah Navas  MRN: [id number]  Admission Date: 9/4/2019  Hospital Length of Stay: 14 days  Attending Physician: Jolene Lua MD  Primary Care Provider: Primary Doctor No  Principal Problem:LVAD (left ventricular assist device) present    Subjective:     Interval History: Still feeling dizzy when she sits up. Also feels bloated and is having sternal pain.     Continuous Infusions:   DOBUTamine 2.5 mcg/kg/min (09/17/19 2317)     Scheduled Meds:   aspirin  325 mg Oral Daily    oxyCODONE  40 mg Oral Q12H    pantoprazole  40 mg Oral Daily    polyethylene glycol  17 g Oral Daily    ramelteon  8 mg Oral QHS    senna-docusate 8.6-50 mg  2 tablet Oral BID    simethicone  1 tablet Oral QID (PC + HS)    venlafaxine  150 mg Oral Daily     PRN Meds:albuterol sulfate, bisacodyl, Dextrose 10% Bolus, Dextrose 10% Bolus, docusate sodium, glucagon (human recombinant), glucose, glucose, hydrocortisone, hydrOXYzine HCl, insulin aspart U-100, magnesium hydroxide 400 mg/5 ml, magnesium sulfate IVPB, ondansetron, oxyCODONE, oxyCODONE-acetaminophen, promethazine (PHENERGAN) IVPB, sodium chloride 0.9%    Review of patient's allergies indicates:   Allergen Reactions    Adhesive Blisters     Reaction to area in chest and up only    Codeine Itching     Objective:     Vital Signs (Most Recent):  Temp: 97.8 °F (36.6 °C) (09/18/19 0812)  Pulse: 61 (09/18/19 0812)  Resp: 20 (09/18/19 0812)  BP: (!) 86/0 (09/18/19 0812)  SpO2: (!) 94 % (09/18/19 0812) Vital Signs (24h Range):  Temp:  [97.6 °F (36.4 °C)-98.7 °F (37.1 °C)] 97.8 °F (36.6 °C)  Pulse:  [47-95] 61  Resp:  [16-20] 20  SpO2:  [94 %-97 %] 94 %  BP: (86-98)/(0-72) 86/0     Patient Vitals for the past 72 hrs (Last 3 readings):   Weight   09/17/19 1000 106.1 kg (233 lb 14.5 oz)   09/17/19 0600 106.1 kg (233 lb 14.5 oz)   09/16/19 0500 106 kg (233 lb 11 oz)     Body mass index is 36.64  kg/m².      Intake/Output Summary (Last 24 hours) at 9/18/2019 1035  Last data filed at 9/18/2019 0600  Gross per 24 hour   Intake 210 ml   Output 750 ml   Net -540 ml        Telemetry: Aflutter.     Physical Exam   Constitutional: She is oriented to person, place, and time. She appears well-developed and well-nourished.   HENT:   Head: Normocephalic.   Eyes: Pupils are equal, round, and reactive to light.   Neck: Normal range of motion. Neck supple.   Cardiovascular: Normal rate and regular rhythm.   VAD hum   Pulmonary/Chest: Effort normal and breath sounds normal.   Abdominal: Soft. Bowel sounds are normal.   Musculoskeletal: Normal range of motion.   Neurological: She is alert and oriented to person, place, and time.   Skin: Skin is warm and dry.   Psychiatric: She has a normal mood and affect. Her behavior is normal.   Nursing note and vitals reviewed.    Significant Labs:  CBC:  Recent Labs   Lab 09/16/19  0244 09/17/19  0905 09/18/19  0318   WBC 7.32 9.29 8.56   RBC 3.66* 4.03 3.61*   HGB 10.7* 11.3* 10.2*   HCT 32.9* 38.1 33.1*    370* 365*   MCV 90 95 92   MCH 29.2 28.0 28.3   MCHC 32.5 29.7* 30.8*     BNP:  Recent Labs   Lab 09/13/19  0400 09/16/19  0243   * 522*     CMP:  Recent Labs   Lab 09/16/19  0521 09/17/19  0904 09/18/19  0319   * 172* 113*   CALCIUM 10.0 9.9 9.5   ALBUMIN 3.4* 3.3* 3.1*   PROT 6.9 7.4 6.6    136 137   K 3.8 4.3 3.9   CO2 28 23 30*   CL 97 96 95   BUN 31* 28* 27*   CREATININE 1.3 1.4 1.1   ALKPHOS 65 67 62   * 71* 50*   AST 46* 30 18   BILITOT 0.7 0.7 0.7      Coagulation:   Recent Labs   Lab 09/16/19  0243 09/16/19  0521 09/17/19  0411 09/17/19  0904 09/18/19 0319   INR 2.1*  --   --  3.4* 4.2*   APTT 33.8* 24.8 39.1*  --  42.0*     LDH:  Recent Labs   Lab 09/16/19  0243 09/17/19  0411 09/18/19 0319   * 309* 279*     Microbiology:  Microbiology Results (last 7 days)     Procedure Component Value Units Date/Time    HPV High Risk Genotypes,  PCR [[id number]] Collected:  09/11/19 1330    Order Status:  Completed Updated:  09/16/19 1259     HPV High Risk type 16, PCR Negative     HPV High Risk type 18, PCR Negative     HPV other High Risk types, PCR Negative     Comment: Other HPV genotypes include:   31,33,35,39,45,51,52,56,58,59,66 and 68.         Narrative:       replaces order [id number]    Blood culture [[id number]] Collected:  09/11/19 0810    Order Status:  Completed Specimen:  Blood from Peripheral, Antecubital, Left Updated:  09/16/19 1012     Blood Culture, Routine No growth after 5 days.    Blood culture [[id number]] Collected:  09/11/19 0832    Order Status:  Completed Specimen:  Blood from Line, Jugular, Internal Right Updated:  09/16/19 1012     Blood Culture, Routine No growth after 5 days.    HPV High Risk Genotypes, PCR [[id number]]     Order Status:  Completed         I have reviewed all pertinent labs within the past 24 hours.    Estimated Creatinine Clearance: 77.5 mL/min (based on SCr of 1.1 mg/dL).    Diagnostic Results:  I have reviewed all pertinent imaging results/findings within the past 24 hours.    Assessment and Plan:     No notes on file    * LVAD (left ventricular assist device) present  -HeartMate 3 Implanted 9/10/19 as DT  -HTS Primary  -Implanted by Dr. Walden  -Continue Coumadin, Goal INR 2.0-3.0. Therapeutic today.   -Antiplatelets  mg.  -LDH is stable overall today. Will continue to monitor daily.  -Speed set at 5200  -Not listed for OHTx.  -Stop  @ 2.5 mcg/kg/min  -2D echo performed 9/15/19. Will repeat at lower speed.   -Lasix gtt on hold for now  -Continue Pt/OT/VAD education    Procedure: Device Interrogation Including analysis of device parameters  Current Settings: Ventricular Assist Device  Review of device function is stable/unstable stable    TXP LVAD INTERROGATIONS 9/18/2019 9/18/2019 9/17/2019 9/17/2019 9/17/2019 9/17/2019 9/17/2019   Type HeartMate3 HeartMate3 HeartMate3 HeartMate3 HeartMate3  HeartMate3 HeartMate3   Flow 3.7 3.3 3.5 3.5 3.2 3.3 4.0   Speed 5200 5200 5200 5250 5200 5200 5300   PI 5.0 5.2 4.7 6.0 5.4 5.0 4.2   Power ([place]) 3.6 3.7 3.6 3.6 3.6 3.6 3.7   LSL 4800 - - - 4800 4800 -   Pulsatility Intermittent pulse - - - - - -       Pleural effusion  -Bilateral pleural effusions present, L>R  -Will review with staff and CTS, may need speed change?    CKD (chronic kidney disease)  -Creatinine at baseline  - Avoid nephrotoxic agents    Abnormal CT scan  -focal opacity found at right base.  DDx: atelectasis.  Recommending repeat CT in 3 months    Enlarged thyroid  Ultrasound done and showed enlarged multinodular thyroid which warrants surveillance in one year.   - TSH and fT4 wnl    History of ventricular tachycardia  -In the setting of hypokalemia  -Monitor electrolytes closely    Acute on chronic combined systolic and diastolic heart failure  NICM EF 20% s/p LVAD 9/10  RHC: done on admit 9/4/19 RA: 20/ 20/ 18 RV: 60/ 8/ 18 PA: 60/ 32/ 45 PWP: 43/ 70/ 40 . Cardiac output was 2.27 by Fitz. Cardiac index is 1.04 L/min/m2. O2 Sat: PA 34%. AO 95% PVR 2.2 PALMER    -see s/p LVAD      Ventricular tachyarrhythmia  - On amio at home, hold for now (see above)   - Goal K >4 and Mg >2  - Monitor on telemetry    Cardiac defibrillator in situ  -Medtronic CRT-D  -Patient began v-pacing at 60 and 9/14/19 began having phrenic nerve stimulation. Amiodarone held and EP consulted, device settings changed and PNS ceased.   -Underlying rhythm a flutter with slow ventricular response at 32 bpm      Americo Albert NP  Heart Transplant  [facility]

## 2019-09-18 NOTE — PT/OT/SLP PROGRESS
Occupational Therapy   Treatment    Name: Deborah Navas  MRN: [id number]  Admitting Diagnosis:  LVAD (left ventricular assist device) present  8 Days Post-Op    Recommendations:     Discharge Recommendations: home health OT  Discharge Equipment Recommendations:  none    Assessment:     Deborah Navas is a 48 y.o. female with a medical diagnosis of LVAD (left ventricular assist device) present.  She presents with limited session due to pt on bedpan for extensive time period. Performance deficits affecting function are weakness, impaired endurance, impaired self care skills, impaired functional mobilty, impaired balance, decreased coordination, visual deficits, impaired cardiopulmonary response to activity.     Rehab Prognosis:  Fair; patient would benefit from acute skilled OT services to address these deficits and reach maximum level of function.       Plan:     Patient to be seen 6 x/week to address the above listed problems via self-care/home management, therapeutic activities, therapeutic exercises  · Plan of Care Expires: 10/11/19  · Plan of Care Reviewed with: patient    Subjective     Pain/Comfort:  · Pain Rating 1: 0/10  · Pain Rating Post-Intervention 1: 0/10    Objective:     Communicated with: RN prior to session.  Patient found supine with wound vac, telemetry, LVAD, parrish catheter upon OT entry to room.  No family present.    General Precautions: Standard, fall, sternal, LVAD     Occupational Performance:     AMPAC 6 Click ADL: 14    Treatment & Education:  Pt with no EOB/OOB due to actively on bedpan x 2 attempts.  Pt reported would be on for a while as was impacted & given a suppository & was actively having BM at time of session.  Pt agreeable to in bed session for education as declined EOB/OOB, & therex in bed while on bedpan.  Pt able to identify 3/3 sternal precautions.  Pt able to name compontents of LVAD.  Provided education regarding importance of 2 points of contact with VAD  to body while mobilizing as well as components of emergency bag & need to have emergency bag with her at all times.  Pt verbalized understanding of all instruction provided.  Pt had no further questions & when asked whether there were any concerns pt reported none.      Patient left supine with all lines intact, call button in reach, RN notified and white board updated.Education:      GOALS:   Multidisciplinary Problems     Occupational Therapy Goals        Problem: Occupational Therapy Goal    Goal Priority Disciplines Outcome Interventions   Occupational Therapy Goal     OT, PT/OT Ongoing (interventions implemented as appropriate)    Description:  Goals to be met by: 10/11/19     Patient will increase functional independence with ADLs by performing:    UE Dressing with [place].  LE Dressing with [place].  Grooming while standing at sink with [place].  Toileting from toilet with [place] for hygiene and clothing management.   Bathing from  shower chair/bench with [place].  Toilet transfer to toilet with [place].  Increased functional strength to WFL for B UE.  Upper extremity exercise program x15 reps per handout, with independence.                       Time Tracking:     OT Date of Treatment: 09/18/19  OT Start Time: 1320  OT Stop Time: 1336  OT Total Time (min): 16 min    Billable Minutes:Therapeutic Activity 16    JAGJIT Gardiner  9/18/2019

## 2019-09-18 NOTE — PHYSICIAN QUERY
PT Name: Deborah Navas  MR #: [id number]     Physician Query Form - Documentation Clarification      CDS/: Sadia Leslie RN, CCDS             Contact information: [email or web address]    This form is a permanent document in the medical record.     Query Date: September 18, 2019    By submitting this query, we are merely seeking further clarification of documentation. Please utilize your independent clinical judgment when addressing the question(s) below.    The Medical record reflects the following:    Supporting Clinical Findings Location in Medical Record      Hydralazine and Isordil added yesterday; BP has been stable    SVR has improved to 898 after adding Hydralazine and Isordil.  Will increase Hydralazine today  -Hypervolemic; but improving on examination today    Increase Hydralazine 50 mg tid  Continue Isosorbide 20 mg tid      9/6 prog note                9/7 prog notes     Patient did have episode of hypertension with /0 at midnight, was given 10 mg hydralazine with DP down to 84/0.      MAP 80 prior to getting up. Patient reported consistent nausea and lightheadedness while getting up to the chair but tolerated well.    Acute on chronic combined systolic and diastolic heart failure  CKD  remains on low dose ,  Lasix gtt on hold for now   9/16 nurse plan of care          9/17 nurse note        9/17 prog note                                                                                Doctor, Please specify diagnosis or diagnoses associated with above clinical findings.    Provider Use Only      (    )  Hypertension    (  x  )  Hypertensive Heart and CKD with HF, Acute on chronic combined systolic and diastolic HF    (    )  Other:_________________                                                                                                                                [  ] Clinically Undetermined

## 2019-09-18 NOTE — PROGRESS NOTES
"[facility]  Endocrinology  Progress Note    Admit Date: 9/4/2019     Reason for Consult: Management of Hyperglycemia     Surgical Procedure and Date: LVAD 9/10/19    HPI:   Patient is a 48 y.o. female with a diagnosis of NICM, CKD, and HLD. Patient admitted with for advanced heart failure options. No previous history of DM per chart review. A1C elevated on pre-op A1C. Endocrinology consulted post-LVAD for BG management.             Interval HPI:   Overnight events: Remains in CTSU, LVAD in place.  BG at or below goal without insulin.  Eating:   <25% - drinking sugary beverages  Nausea: No  Hypoglycemia and intervention: No  Fever: No  TPN and/or TF: No    BP (!) 76/0 (BP Location: Left arm, Patient Position: Lying)   Pulse 60   Temp 98.5 °F (36.9 °C) (Oral)   Resp 18   Ht 5' 7" (1.702 m)   Wt 106.1 kg (233 lb 14.5 oz)   LMP  (Within Years)   SpO2 (!) 94%   Breastfeeding? No   BMI 36.64 kg/m²      Labs Reviewed and Include    Recent Labs   Lab 09/18/19  0319   *   CALCIUM 9.5   ALBUMIN 3.1*   PROT 6.6      K 3.9   CO2 30*   CL 95   BUN 27*   CREATININE 1.1   ALKPHOS 62   ALT 50*   AST 18   BILITOT 0.7     Lab Results   Component Value Date    WBC 8.56 09/18/2019    HGB 10.2 (L) 09/18/2019    HCT 33.1 (L) 09/18/2019    MCV 92 09/18/2019     (H) 09/18/2019     No results for input(s): TSH, FREET4 in the last 168 hours.  Lab Results   Component Value Date    HGBA1C 6.5 (H) 09/06/2019       Nutritional status:   Body mass index is 36.64 kg/m².  Lab Results   Component Value Date    ALBUMIN 3.1 (L) 09/18/2019    ALBUMIN 3.3 (L) 09/17/2019    ALBUMIN 3.4 (L) 09/16/2019     Lab Results   Component Value Date    PREALBUMIN 12 (L) 09/17/2019    PREALBUMIN 22 09/11/2019    PREALBUMIN 32 09/06/2019       Estimated Creatinine Clearance: 77.5 mL/min (based on SCr of 1.1 mg/dL).    Accu-Checks  Recent Labs     09/16/19  0804 09/16/19  1117 09/16/19  1810 09/16/19  2218 " 09/17/19  0806 09/17/19  1105 09/17/19  1344 09/17/19  1653 09/17/19  2115 09/18/19  0731   POCTGLUCOSE 146* 144* 175* 161* 148* 145* 120* 113* 97 110       Current Medications and/or Treatments Impacting Glycemic Control  Immunotherapy:    Immunosuppressants     None        Steroids:   Hormones (From admission, onward)    None        Pressors:    Autonomic Drugs (From admission, onward)    None        Hyperglycemia/Diabetes Medications:   Antihyperglycemics (From admission, onward)    Start     Stop Route Frequency Ordered    09/12/19 1306  insulin aspart U-100 pen 0-5 Units      -- SubQ Before meals & nightly PRN 09/12/19 1206          ASSESSMENT and PLAN    * LVAD (left ventricular assist device) present  Managed per primary.   avoid hypoglycemia        Acute hyperglycemia  BG goal 140 - 180     Patient has no history of DM    Patient has no correction scale insulin needs despite drinking sugary beverage and eating small amounts  Discontinue BG monitoring, recommend monitoring glucose with daily labs    Endocrine to sign off, please re-consult if needed at any time    Discharge recommendations: A1C in 6 months               Acute on chronic combined systolic and diastolic heart failure  Managed per primary. S/p LVAD        CKD (chronic kidney disease)  Titrate insulin slowly to avoid hypoglycemia as the risk of hypoglycemia increases with decreased creatinine clearance.  Caution with insulin stacking  Estimated Creatinine Clearance: 66.6 mL/min (based on SCr of 1.3 mg/dL).            Sami John NP  Endocrinology  [facility]

## 2019-09-18 NOTE — PLAN OF CARE
Problem: Occupational Therapy Goal  Goal: Occupational Therapy Goal  Goals to be met by: 10/11/19     Patient will increase functional independence with ADLs by performing:    UE Dressing with [place].  LE Dressing with [place].  Grooming while standing at sink with [place].  Toileting from toilet with [place] for hygiene and clothing management.   Bathing from  shower chair/bench with [place].  Toilet transfer to toilet with [place].  Increased functional strength to WFL for B UE.  Upper extremity exercise program x15 reps per handout, with independence.      Outcome: Ongoing (interventions implemented as appropriate)  Goals remain appropriate

## 2019-09-18 NOTE — PROGRESS NOTES
LVAD dressing change completed with soap and water using sterile technique. Small amount of old blood on dressing. No order or drainage present at drive line exit site. Exit site is a 2, pink, and sutures intact. Pt had no complaints of pain or tenderness at drive line exit site. Pt tolerated well. Will continue to monitor

## 2019-09-18 NOTE — NURSING
LVAD dressing change completed using sterile technique with soap and water by caregiver, sister Flavia. Flavia required correction but maintained sterile field throughout procedure with cues from RN. Areas of correction include: sterile gloving and staying out away from Flavia's body. DLES is a 2 with scant drainage noted on the drain sponge, slight pink color, and scabby area noted around driveline. Tolerated without any complication. Sutures remain intact, no tenderness noted. Patients caregiver IS NOT checked off on dressing change.  RN gave Flavia supplies to practice techniques especially sterile gloving.

## 2019-09-18 NOTE — PLAN OF CARE
Problem: Adult Inpatient Plan of Care  Goal: Plan of Care Review  Outcome: Ongoing (interventions implemented as appropriate)  Pt free of falls and injury during shift with sister at bed side. POC reviewed with pt VS stable and AAox4. Waffle mattress in place. On telemetry. BS monitored. Dobutamine infusing, place new tubing. Wound vac intact with no drainage. LVAD number WNL. LVAD dressing change completed, see note. Pt was able to keep HOB elevated at 45 degrees for 30 minutes tonight. PRN pain med given when available, pt reports pain a 7. 5L NC, pulse ox 90s. Pt complained of pulling and burning from indwelling parrish, reposition stat lock and collection bag, pt reports some relief but is burning less.  No acute events noted at this time. No complaints. Educated pt why she is at risk for falls and to use call light for assistance ambulating. Yellow non-slip socks on pt. Bed low and locked, call light with in reach. Will continue to monitor.

## 2019-09-18 NOTE — SUBJECTIVE & OBJECTIVE
"Interval HPI:   Overnight events: Remains in CTSU, LVAD in place.  BG at or below goal without insulin.  Eating:   <25% - drinking sugary beverages  Nausea: No  Hypoglycemia and intervention: No  Fever: No  TPN and/or TF: No    BP (!) 76/0 (BP Location: Left arm, Patient Position: Lying)   Pulse 60   Temp 98.5 °F (36.9 °C) (Oral)   Resp 18   Ht 5' 7" (1.702 m)   Wt 106.1 kg (233 lb 14.5 oz)   LMP  (Within Years)   SpO2 (!) 94%   Breastfeeding? No   BMI 36.64 kg/m²     Labs Reviewed and Include    Recent Labs   Lab 09/18/19  0319   *   CALCIUM 9.5   ALBUMIN 3.1*   PROT 6.6      K 3.9   CO2 30*   CL 95   BUN 27*   CREATININE 1.1   ALKPHOS 62   ALT 50*   AST 18   BILITOT 0.7     Lab Results   Component Value Date    WBC 8.56 09/18/2019    HGB 10.2 (L) 09/18/2019    HCT 33.1 (L) 09/18/2019    MCV 92 09/18/2019     (H) 09/18/2019     No results for input(s): TSH, FREET4 in the last 168 hours.  Lab Results   Component Value Date    HGBA1C 6.5 (H) 09/06/2019       Nutritional status:   Body mass index is 36.64 kg/m².  Lab Results   Component Value Date    ALBUMIN 3.1 (L) 09/18/2019    ALBUMIN 3.3 (L) 09/17/2019    ALBUMIN 3.4 (L) 09/16/2019     Lab Results   Component Value Date    PREALBUMIN 12 (L) 09/17/2019    PREALBUMIN 22 09/11/2019    PREALBUMIN 32 09/06/2019       Estimated Creatinine Clearance: 77.5 mL/min (based on SCr of 1.1 mg/dL).    Accu-Checks  Recent Labs     09/16/19  0804 09/16/19  1117 09/16/19  1810 09/16/19  2218 09/17/19  0806 09/17/19  1105 09/17/19  1344 09/17/19  1653 09/17/19  2115 09/18/19  0731   POCTGLUCOSE 146* 144* 175* 161* 148* 145* 120* 113* 97 110       Current Medications and/or Treatments Impacting Glycemic Control  Immunotherapy:    Immunosuppressants     None        Steroids:   Hormones (From admission, onward)    None        Pressors:    Autonomic Drugs (From admission, onward)    None        Hyperglycemia/Diabetes Medications:   Antihyperglycemics (From " admission, onward)    Start     Stop Route Frequency Ordered    09/12/19 1306  insulin aspart U-100 pen 0-5 Units      -- SubQ Before meals & nightly PRN 09/12/19 1206

## 2019-09-18 NOTE — PLAN OF CARE
Problem: Adult Inpatient Plan of Care  Goal: Plan of Care Review  Outcome: Ongoing (interventions implemented as appropriate)  Plan of care reviewed with pt, verbalized understanding  Answered questions  Free from falls and injury  Afebrile  SR/B on tele  LVAD HM3, smooth hum noted  DRES dressing changed  PT OT seen  Will continue to monitor

## 2019-09-19 DIAGNOSIS — Z95.811 HEART REPLACED BY HEART ASSIST DEVICE: Primary | ICD-10-CM

## 2019-09-19 DIAGNOSIS — T46.2X5A AMIODARONE PULMONARY TOXICITY: ICD-10-CM

## 2019-09-19 DIAGNOSIS — E78.5 HYPERLIPIDEMIA, UNSPECIFIED HYPERLIPIDEMIA TYPE: ICD-10-CM

## 2019-09-19 DIAGNOSIS — E05.90 HYPERTHYROIDISM: ICD-10-CM

## 2019-09-19 DIAGNOSIS — J98.4 AMIODARONE PULMONARY TOXICITY: ICD-10-CM

## 2019-09-19 PROBLEM — I48.92 ATRIAL FLUTTER: Status: ACTIVE | Noted: 2019-09-19

## 2019-09-19 LAB
ALBUMIN SERPL BCP-MCNC: 2.8 G/DL (ref 3.5–5.2)
ALP SERPL-CCNC: 66 U/L (ref 55–135)
ALT SERPL W/O P-5'-P-CCNC: 35 U/L (ref 10–44)
ANION GAP SERPL CALC-SCNC: 12 MMOL/L (ref 8–16)
APTT BLDCRRT: 47 SEC (ref 21–32)
AST SERPL-CCNC: 15 U/L (ref 10–40)
BASOPHILS # BLD AUTO: 0.04 K/UL (ref 0–0.2)
BASOPHILS NFR BLD: 0.4 % (ref 0–1.9)
BILIRUB SERPL-MCNC: 0.6 MG/DL (ref 0.1–1)
BUN SERPL-MCNC: 26 MG/DL (ref 6–20)
CALCIUM SERPL-MCNC: 9.8 MG/DL (ref 8.7–10.5)
CHLORIDE SERPL-SCNC: 93 MMOL/L (ref 95–110)
CO2 SERPL-SCNC: 29 MMOL/L (ref 23–29)
CREAT SERPL-MCNC: 1.1 MG/DL (ref 0.5–1.4)
DIFFERENTIAL METHOD: ABNORMAL
EOSINOPHIL # BLD AUTO: 0.5 K/UL (ref 0–0.5)
EOSINOPHIL NFR BLD: 5 % (ref 0–8)
ERYTHROCYTE [DISTWIDTH] IN BLOOD BY AUTOMATED COUNT: 15.1 % (ref 11.5–14.5)
EST. GFR  (AFRICAN AMERICAN): >60 ML/MIN/1.73 M^2
EST. GFR  (NON AFRICAN AMERICAN): 59.1 ML/MIN/1.73 M^2
GLUCOSE SERPL-MCNC: 107 MG/DL (ref 70–110)
HCT VFR BLD AUTO: 34.4 % (ref 37–48.5)
HGB BLD-MCNC: 10.3 G/DL (ref 12–16)
IMM GRANULOCYTES # BLD AUTO: 0.18 K/UL (ref 0–0.04)
IMM GRANULOCYTES NFR BLD AUTO: 1.9 % (ref 0–0.5)
INR PPP: 3.7 (ref 0.8–1.2)
LDH SERPL L TO P-CCNC: 235 U/L (ref 110–260)
LYMPHOCYTES # BLD AUTO: 0.8 K/UL (ref 1–4.8)
LYMPHOCYTES NFR BLD: 8.8 % (ref 18–48)
MAGNESIUM SERPL-MCNC: 2.1 MG/DL (ref 1.6–2.6)
MCH RBC QN AUTO: 28 PG (ref 27–31)
MCHC RBC AUTO-ENTMCNC: 29.9 G/DL (ref 32–36)
MCV RBC AUTO: 94 FL (ref 82–98)
MONOCYTES # BLD AUTO: 1 K/UL (ref 0.3–1)
MONOCYTES NFR BLD: 10.6 % (ref 4–15)
NEUTROPHILS # BLD AUTO: 7 K/UL (ref 1.8–7.7)
NEUTROPHILS NFR BLD: 73.3 % (ref 38–73)
NRBC BLD-RTO: 0 /100 WBC
PLATELET # BLD AUTO: 439 K/UL (ref 150–350)
PMV BLD AUTO: 9.4 FL (ref 9.2–12.9)
POTASSIUM SERPL-SCNC: 4.6 MMOL/L (ref 3.5–5.1)
PROT SERPL-MCNC: 6.5 G/DL (ref 6–8.4)
PROTHROMBIN TIME: 36.1 SEC (ref 9–12.5)
RBC # BLD AUTO: 3.68 M/UL (ref 4–5.4)
SODIUM SERPL-SCNC: 134 MMOL/L (ref 136–145)
WBC # BLD AUTO: 9.58 K/UL (ref 3.9–12.7)

## 2019-09-19 PROCEDURE — 93750 INTERROGATION VAD IN PERSON: CPT | Mod: NTX,,, | Performed by: INTERNAL MEDICINE

## 2019-09-19 PROCEDURE — 85610 PROTHROMBIN TIME: CPT | Mod: NTX

## 2019-09-19 PROCEDURE — 25000003 PHARM REV CODE 250: Mod: NTX | Performed by: STUDENT IN AN ORGANIZED HEALTH CARE EDUCATION/TRAINING PROGRAM

## 2019-09-19 PROCEDURE — 83735 ASSAY OF MAGNESIUM: CPT | Mod: NTX

## 2019-09-19 PROCEDURE — 85730 THROMBOPLASTIN TIME PARTIAL: CPT | Mod: NTX

## 2019-09-19 PROCEDURE — 97530 THERAPEUTIC ACTIVITIES: CPT | Mod: NTX

## 2019-09-19 PROCEDURE — 85025 COMPLETE CBC W/AUTO DIFF WBC: CPT | Mod: NTX

## 2019-09-19 PROCEDURE — 97110 THERAPEUTIC EXERCISES: CPT | Mod: NTX

## 2019-09-19 PROCEDURE — 80053 COMPREHEN METABOLIC PANEL: CPT | Mod: NTX

## 2019-09-19 PROCEDURE — 20600001 HC STEP DOWN PRIVATE ROOM: Mod: NTX

## 2019-09-19 PROCEDURE — 63600175 PHARM REV CODE 636 W HCPCS: Mod: NTX | Performed by: NURSE PRACTITIONER

## 2019-09-19 PROCEDURE — 36415 COLL VENOUS BLD VENIPUNCTURE: CPT | Mod: NTX

## 2019-09-19 PROCEDURE — 25000003 PHARM REV CODE 250: Mod: NTX | Performed by: INTERNAL MEDICINE

## 2019-09-19 PROCEDURE — 25000003 PHARM REV CODE 250: Mod: NTX | Performed by: NURSE PRACTITIONER

## 2019-09-19 PROCEDURE — 83615 LACTATE (LD) (LDH) ENZYME: CPT | Mod: NTX

## 2019-09-19 PROCEDURE — 99233 PR SUBSEQUENT HOSPITAL CARE,LEVL III: ICD-10-PCS | Mod: NTX,,, | Performed by: INTERNAL MEDICINE

## 2019-09-19 PROCEDURE — 25000003 PHARM REV CODE 250: Mod: NTX | Performed by: THORACIC SURGERY (CARDIOTHORACIC VASCULAR SURGERY)

## 2019-09-19 PROCEDURE — 93750 PR INTERROGATE VENT ASSIST DEV, IN PERSON, W PHYSICIAN ANALYSIS: ICD-10-PCS | Mod: NTX,,, | Performed by: INTERNAL MEDICINE

## 2019-09-19 PROCEDURE — 27000248 HC VAD-ADDITIONAL DAY: Mod: NTX

## 2019-09-19 PROCEDURE — 99233 SBSQ HOSP IP/OBS HIGH 50: CPT | Mod: NTX,,, | Performed by: INTERNAL MEDICINE

## 2019-09-19 RX ORDER — MIRTAZAPINE 15 MG/1
15 TABLET, FILM COATED ORAL NIGHTLY
Status: DISCONTINUED | OUTPATIENT
Start: 2019-09-19 | End: 2019-10-01 | Stop reason: HOSPADM

## 2019-09-19 RX ORDER — FUROSEMIDE 10 MG/ML
60 INJECTION INTRAMUSCULAR; INTRAVENOUS ONCE
Status: COMPLETED | OUTPATIENT
Start: 2019-09-19 | End: 2019-09-19

## 2019-09-19 RX ORDER — OXYCODONE HCL 20 MG/1
20 TABLET, FILM COATED, EXTENDED RELEASE ORAL EVERY 12 HOURS
Status: DISCONTINUED | OUTPATIENT
Start: 2019-09-19 | End: 2019-10-01 | Stop reason: HOSPADM

## 2019-09-19 RX ORDER — SYRING-NEEDL,DISP,INSUL,0.3 ML 29 G X1/2"
296 SYRINGE, EMPTY DISPOSABLE MISCELLANEOUS ONCE
Status: COMPLETED | OUTPATIENT
Start: 2019-09-19 | End: 2019-09-19

## 2019-09-19 RX ADMIN — VENLAFAXINE 150 MG: 37.5 TABLET ORAL at 08:09

## 2019-09-19 RX ADMIN — ASPIRIN 325 MG: 325 TABLET, COATED ORAL at 08:09

## 2019-09-19 RX ADMIN — PANTOPRAZOLE SODIUM 40 MG: 40 TABLET, DELAYED RELEASE ORAL at 08:09

## 2019-09-19 RX ADMIN — SIMETHICONE CHEW TAB 80 MG 80 MG: 80 TABLET ORAL at 01:09

## 2019-09-19 RX ADMIN — SIMETHICONE CHEW TAB 80 MG 80 MG: 80 TABLET ORAL at 08:09

## 2019-09-19 RX ADMIN — GABAPENTIN 400 MG: 100 CAPSULE ORAL at 10:09

## 2019-09-19 RX ADMIN — POLYETHYLENE GLYCOL 3350 17 G: 17 POWDER, FOR SOLUTION ORAL at 08:09

## 2019-09-19 RX ADMIN — SIMETHICONE CHEW TAB 80 MG 80 MG: 80 TABLET ORAL at 10:09

## 2019-09-19 RX ADMIN — RAMELTEON 8 MG: 8 TABLET ORAL at 09:09

## 2019-09-19 RX ADMIN — MIRTAZAPINE 15 MG: 15 TABLET, FILM COATED ORAL at 09:09

## 2019-09-19 RX ADMIN — FUROSEMIDE 60 MG: 10 INJECTION, SOLUTION INTRAMUSCULAR; INTRAVENOUS at 08:09

## 2019-09-19 RX ADMIN — SIMETHICONE CHEW TAB 80 MG 80 MG: 80 TABLET ORAL at 09:09

## 2019-09-19 RX ADMIN — GABAPENTIN 400 MG: 100 CAPSULE ORAL at 09:09

## 2019-09-19 RX ADMIN — OXYCODONE HYDROCHLORIDE 20 MG: 20 TABLET, FILM COATED, EXTENDED RELEASE ORAL at 09:09

## 2019-09-19 RX ADMIN — MAGNESIUM HYDROXIDE 2400 MG: 400 SUSPENSION ORAL at 08:09

## 2019-09-19 RX ADMIN — SENNOSIDES AND DOCUSATE SODIUM 2 TABLET: 8.6; 5 TABLET ORAL at 09:09

## 2019-09-19 RX ADMIN — MAGNESIUM CITRATE 296 ML: 1.75 LIQUID ORAL at 11:09

## 2019-09-19 RX ADMIN — SENNOSIDES AND DOCUSATE SODIUM 2 TABLET: 8.6; 5 TABLET ORAL at 08:09

## 2019-09-19 RX ADMIN — OXYCODONE HYDROCHLORIDE 40 MG: 20 TABLET, FILM COATED, EXTENDED RELEASE ORAL at 08:09

## 2019-09-19 NOTE — PROGRESS NOTES
09/19/2019  Terra Porter    Current provider:  Jolene Lua MD      I, Terra Porter, rounded on Deborah Navas to ensure all mechanical assist device settings (IABP or VAD) were appropriate and all parameters were within limits.  I was able to ensure all back up equipment was present, the staff had no issues, and the Perfusion Department daily rounding was complete.    8:50 AM

## 2019-09-19 NOTE — PROGRESS NOTES
Update:    SW to pt's room to provide emotional support and continuity of care. Pt and caregiver aaox4, calm, and pleasant. Pt reports she has been in terrible pain since Friday due to gas and constipation. RN in room and addressing pt's complaints. SW providing supportive counseling to pt and caregiver. Pt and caregiver report no other questions for SW at this time. SW providing ongoing psychosocial and counseling support, education, assistance, resources, and discharge planning as indicated. SW continuing to follow and remains available.

## 2019-09-19 NOTE — PLAN OF CARE
Problem: Adult Inpatient Plan of Care  Goal: Plan of Care Review  Outcome: Ongoing (interventions implemented as appropriate)  Plan of care reviewed with pt, verbalized understanding  Answered questions  Free from falls and injury  Afebrile  Paced/ flutter on tele  LVAD HM3, smooth hum noted  DRES dressing changed  PT OT seen  Will continue to monitor

## 2019-09-19 NOTE — SUBJECTIVE & OBJECTIVE
Interval History: feeling much better this am. Pain much better controlled. Still dizzy though. No BM with enema yesterday.     Scheduled Meds:   aspirin  325 mg Oral Daily    gabapentin  400 mg Oral BID    magnesium citrate  296 mL Oral Once    oxyCODONE  40 mg Oral Q12H    pantoprazole  40 mg Oral Daily    polyethylene glycol  17 g Oral Daily    ramelteon  8 mg Oral QHS    senna-docusate 8.6-50 mg  2 tablet Oral BID    simethicone  1 tablet Oral QID (PC + HS)    venlafaxine  150 mg Oral Daily     PRN Meds:albuterol sulfate, bisacodyl, docusate sodium, hydrocortisone, hydrOXYzine HCl, magnesium hydroxide 400 mg/5 ml, magnesium sulfate IVPB, ondansetron, oxyCODONE, oxyCODONE-acetaminophen, promethazine (PHENERGAN) IVPB, simethicone, sodium chloride 0.9%    Review of patient's allergies indicates:   Allergen Reactions    Adhesive Blisters     Reaction to area in chest and up only    Codeine Itching     Objective:     Vital Signs (Most Recent):  Temp: 97.7 °F (36.5 °C) (09/19/19 0759)  Pulse: 60 (09/19/19 0759)  Resp: 18 (09/19/19 0759)  BP: (!) 92/0 (09/19/19 0759)  SpO2: 97 % (09/19/19 0759) Vital Signs (24h Range):  Temp:  [97.7 °F (36.5 °C)-99.1 °F (37.3 °C)] 97.7 °F (36.5 °C)  Pulse:  [] 60  Resp:  [18] 18  SpO2:  [93 %-99 %] 97 %  BP: ()/(0-63) 92/0     Patient Vitals for the past 72 hrs (Last 3 readings):   Weight   09/18/19 1200 105.7 kg (233 lb)   09/17/19 1000 106.1 kg (233 lb 14.5 oz)   09/17/19 0600 106.1 kg (233 lb 14.5 oz)     Body mass index is 36.49 kg/m².      Intake/Output Summary (Last 24 hours) at 9/19/2019 0831  Last data filed at 9/19/2019 0600  Gross per 24 hour   Intake 703.11 ml   Output 700 ml   Net 3.11 ml        Telemetry: Aflutter.     Physical Exam   Constitutional: She is oriented to person, place, and time. She appears well-developed and well-nourished.   HENT:   Head: Normocephalic.   Eyes: Pupils are equal, round, and reactive to light.   Neck: Normal range of  motion. Neck supple.   Cardiovascular: Normal rate and regular rhythm.   VAD hum   Pulmonary/Chest: Effort normal and breath sounds normal.   Abdominal: Soft. Bowel sounds are normal.   Musculoskeletal: Normal range of motion.   Neurological: She is alert and oriented to person, place, and time.   Skin: Skin is warm and dry.   Psychiatric: She has a normal mood and affect. Her behavior is normal.   Nursing note and vitals reviewed.    Significant Labs:  CBC:  Recent Labs   Lab 09/17/19  0905 09/18/19 0318 09/19/19 0416   WBC 9.29 8.56 9.58   RBC 4.03 3.61* 3.68*   HGB 11.3* 10.2* 10.3*   HCT 38.1 33.1* 34.4*   * 365* 439*   MCV 95 92 94   MCH 28.0 28.3 28.0   MCHC 29.7* 30.8* 29.9*     BNP:  Recent Labs   Lab 09/13/19  0400 09/16/19  0243   * 522*     CMP:  Recent Labs   Lab 09/17/19  0904 09/18/19 0319 09/19/19 0418   * 113* 107   CALCIUM 9.9 9.5 9.8   ALBUMIN 3.3* 3.1* 2.8*   PROT 7.4 6.6 6.5    137 134*   K 4.3 3.9 4.6   CO2 23 30* 29   CL 96 95 93*   BUN 28* 27* 26*   CREATININE 1.4 1.1 1.1   ALKPHOS 67 62 66   ALT 71* 50* 35   AST 30 18 15   BILITOT 0.7 0.7 0.6      Coagulation:   Recent Labs   Lab 09/17/19 0411 09/17/19  0904 09/18/19 0319 09/19/19 0416   INR  --  3.4* 4.2* 3.7*   APTT 39.1*  --  42.0* 47.0*     LDH:  Recent Labs   Lab 09/17/19 0411 09/18/19 0319 09/19/19 0416   * 279* 235     Microbiology:  Microbiology Results (last 7 days)     Procedure Component Value Units Date/Time    HPV High Risk Genotypes, PCR [[id number]] Collected:  09/11/19 1330    Order Status:  Completed Updated:  09/16/19 1259     HPV High Risk type 16, PCR Negative     HPV High Risk type 18, PCR Negative     HPV other High Risk types, PCR Negative     Comment: Other HPV genotypes include:   31,33,35,39,45,51,52,56,58,59,66 and 68.         Narrative:       replaces order [id number]    Blood culture [[id number]] Collected:  09/11/19 0810    Order Status:  Completed Specimen:  Blood from  Peripheral, Antecubital, Left Updated:  09/16/19 1012     Blood Culture, Routine No growth after 5 days.    Blood culture [[id number]] Collected:  09/11/19 0832    Order Status:  Completed Specimen:  Blood from Line, Jugular, Internal Right Updated:  09/16/19 1012     Blood Culture, Routine No growth after 5 days.        I have reviewed all pertinent labs within the past 24 hours.    Estimated Creatinine Clearance: 77.4 mL/min (based on SCr of 1.1 mg/dL).    Diagnostic Results:  I have reviewed all pertinent imaging results/findings within the past 24 hours.

## 2019-09-19 NOTE — PROGRESS NOTES
09/19/19 0731   Vital Signs   BP (!) 102/0   BP Location Left arm   BP Method Automatic   Patient Position Lying     S. F , MD notified of above BP.  Patient asymptomatic.    MD notified Dopplers have been matching systolic BP during the night.  No new orders at this time.  Will continue to monitor.

## 2019-09-19 NOTE — PLAN OF CARE
Problem: Occupational Therapy Goal  Goal: Occupational Therapy Goal  Goals to be met by: 10/11/19     Patient will increase functional independence with ADLs by performing:    UE Dressing with [place].  LE Dressing with [place].  Grooming while standing at sink with [place].  Toileting from toilet with [place] for hygiene and clothing management.   Bathing from  shower chair/bench with [place].  Toilet transfer to toilet with [place].  Increased functional strength to WFL for B UE.  Upper extremity exercise program x15 reps per handout, with independence.      Goals remain appropriate.

## 2019-09-19 NOTE — PT/OT/SLP PROGRESS
"Physical Therapy Treatment    Patient Name:  Deborah Navas   MRN:  [id number]    Recommendations:     Discharge Recommendations:  home   Discharge Equipment Recommendations: none   Barriers to discharge: not medically appropriate     Assessment:     Deborah Navas is a 48 y.o. female admitted with a medical diagnosis of LVAD (left ventricular assist device) present.  She presents with the following impairments/functional limitations:  weakness, impaired endurance, impaired functional mobilty, gait instability, impaired balance, impaired cardiopulmonary response to activity, pain. Pt continuing to demo' increased dizziness with all upright activity. Pt demo's frequent episodes of holding breath, encouraged pt to perform deep breathing to prevent valsalva maneuver. Pt LVAD flow fluctuating between 2.7 and 3.8. Pt performed 1x sit <> stand to adjust position in bed, declined to transfer to chair but agreeable to transfer to chair with nursing for meals. Pt would continue to benefit from acute skilled therapy intervention to address deficits and progress toward prior level of function.       Rehab Prognosis: Good; patient would benefit from acute skilled PT services to address these deficits and reach maximum level of function.    Recent Surgery: Procedure(s) (LRB):  INSERTION-LEFT VENTRICULAR ASSIST DEVICE (N/A)  INSERTION, GRAFT, PERICARDIUM  CLOSURE, WOUND, STERNUM 9 Days Post-Op    Plan:     During this hospitalization, patient to be seen 6 x/week to address the identified rehab impairments via gait training, therapeutic exercises, therapeutic activities, neuromuscular re-education and progress toward the following goals:    · Plan of Care Expires:  10/10/19    Subjective     Chief Complaint: Pt states "I am in an incredible amount of pain"   Patient/Family Comments/goals: to get better and return home   Pain/Comfort:  · Pain Rating 1: (Pt reports abdominal pain 2/2 "gas pain" did not quantify " )  · Pain Addressed 1: Reposition, Distraction      Objective:     Communicated with RN prior to session.  Patient found HOB elevated with wound vac, telemetry, LVAD, parrish catheter upon PT entry to room.     General Precautions: Standard, fall, LVAD, sternal   Orthopedic Precautions:N/A   Braces: N/A     Functional Mobility:  · Bed Mobility:     · Supine to Sit: minimum assistance  · Sit to Supine: minimum assistance  · Transfers:     · Sit to Stand:  stand by assistance with no AD   · Scooting: lateral scooting along EOB with contact guard assistance       AM-PAC 6 CLICK MOBILITY  Turning over in bed (including adjusting bedclothes, sheets and blankets)?: 3  Sitting down on and standing up from a chair with arms (e.g., wheelchair, bedside commode, etc.): 3  Moving from lying on back to sitting on the side of the bed?: 3  Moving to and from a bed to a chair (including a wheelchair)?: 3  Need to walk in hospital room?: 1  Climbing 3-5 steps with a railing?: 1  Basic Mobility Total Score: 14       Therapeutic Activities and Exercises:   Pt educated on role of PT/POC. Pt verbalized understanding.   Pt sat EOB for ~20 mins with supervision with occasional stand by assistance 2/2 increased lateral sway and reports of increased dizziness.   Upon sitting up, pt LVAD flow decreased from 3.8 to 2.7 with reports of dizziness. Pt encouraged to perform LAQ, ankle pumps, and deep breathing,flow improved to 3.3 but continued fluctuating between 2.9 and 3.3.   Pt educated on importance of deep breathing and prevention of valsalva maneuver caused by holding breath. Pt demo'd understanding.   Pt encouraged to only perform OOB mobility with assistance from nursing/therapy. Pt agreeable.   Pt encouraged to sit up in chair for meals. Pt agreeable.     Patient left HOB elevated with all lines intact, call button in reach and RN notified..    GOALS:   Multidisciplinary Problems     Physical Therapy Goals        Problem: Physical Therapy  Goal    Goal Priority Disciplines Outcome Goal Variances Interventions   Physical Therapy Goal     PT, PT/OT Ongoing (interventions implemented as appropriate)     Description:  Goals to be met by: 10/1/2019    Patient will increase functional independence with mobility by performin. Supine to sit with Contact Guard Assistance - not met  2. Sit to stand transfer with Supervision - not met  3. Gait  x 200 feet with Supervision  - not met  4. Ascend/descend 2 stair with Contact Guard Assistance - not met                      Time Tracking:     PT Received On: 19  PT Start Time: 858     PT Stop Time: 929  PT Total Time (min): 31 min     Billable Minutes: Therapeutic Exercise 25 mins     Treatment Type: Treatment  PT/PTA: PT     PTA Visit Number: 0     Consuelo Toledo, PT  2019

## 2019-09-19 NOTE — ASSESSMENT & PLAN NOTE
-HeartMate 3 Implanted 9/10/19 as DT.  -HTS Primary.  -Implanted by Dr. Walden  -Continue Coumadin, Goal INR 2.0-3.0. Therapeutic today.   -Antiplatelets  mg.  -LDH is stable overall today. Will continue to monitor daily.  -Speed set at 5200  -Not listed for OHTx.  -Stop  @ 2.5 mcg/kg/min  -Echo 9/18: LVIDD 5.69, TAPSE 0.86, AV does not open. The ventricular septum is at midline.  -Restart IVP Lasix.   -Continue Pt/OT/VAD education    Procedure: Device Interrogation Including analysis of device parameters  Current Settings: Ventricular Assist Device  Review of device function is stable/unstable stable    TXP LVAD INTERROGATIONS 9/19/2019 9/19/2019 9/19/2019 9/18/2019 9/18/2019 9/18/2019 9/18/2019   Type HeartMate3 HeartMate3 HeartMate3 HeartMate3 HeartMate3 HeartMate3 HeartMate3   Flow 3.3 3.2 3.4 3.4 3.7 3.4 3.7   Speed 5200 5200 5200 5200 5200 5200 5200   PI 5.5 5.6 5.4 5.3 5.1 5.3 5.0   Power (Orellana) 3.6 3.4 3.5 3.6 3.6 3.5 3.6   LSL 4800 - - - - - 4800   Pulsatility Intermittent pulse - - - - - Intermittent pulse

## 2019-09-19 NOTE — PROGRESS NOTES
[facility]  Heart Transplant  Progress Note    Patient Name: Deborah Navas  MRN: [id number]  Admission Date: 9/4/2019  Hospital Length of Stay: 15 days  Attending Physician: Jolene Lua MD  Primary Care Provider: Primary Doctor No  Principal Problem:LVAD (left ventricular assist device) present    Subjective:     Interval History: feeling much better this am. Pain much better controlled. Still dizzy though. No BM with enema yesterday.     Scheduled Meds:   aspirin  325 mg Oral Daily    gabapentin  400 mg Oral BID    magnesium citrate  296 mL Oral Once    oxyCODONE  40 mg Oral Q12H    pantoprazole  40 mg Oral Daily    polyethylene glycol  17 g Oral Daily    ramelteon  8 mg Oral QHS    senna-docusate 8.6-50 mg  2 tablet Oral BID    simethicone  1 tablet Oral QID (PC + HS)    venlafaxine  150 mg Oral Daily     PRN Meds:albuterol sulfate, bisacodyl, docusate sodium, hydrocortisone, hydrOXYzine HCl, magnesium hydroxide 400 mg/5 ml, magnesium sulfate IVPB, ondansetron, oxyCODONE, oxyCODONE-acetaminophen, promethazine (PHENERGAN) IVPB, simethicone, sodium chloride 0.9%    Review of patient's allergies indicates:   Allergen Reactions    Adhesive Blisters     Reaction to area in chest and up only    Codeine Itching     Objective:     Vital Signs (Most Recent):  Temp: 97.7 °F (36.5 °C) (09/19/19 0759)  Pulse: 60 (09/19/19 0759)  Resp: 18 (09/19/19 0759)  BP: (!) 92/0 (09/19/19 0759)  SpO2: 97 % (09/19/19 0759) Vital Signs (24h Range):  Temp:  [97.7 °F (36.5 °C)-99.1 °F (37.3 °C)] 97.7 °F (36.5 °C)  Pulse:  [] 60  Resp:  [18] 18  SpO2:  [93 %-99 %] 97 %  BP: ()/(0-63) 92/0     Patient Vitals for the past 72 hrs (Last 3 readings):   Weight   09/18/19 1200 105.7 kg (233 lb)   09/17/19 1000 106.1 kg (233 lb 14.5 oz)   09/17/19 0600 106.1 kg (233 lb 14.5 oz)     Body mass index is 36.49 kg/m².      Intake/Output Summary (Last 24 hours) at 9/19/2019 0831  Last data filed at  9/19/2019 0600  Gross per 24 hour   Intake 703.11 ml   Output 700 ml   Net 3.11 ml        Telemetry: Aflutter.     Physical Exam   Constitutional: She is oriented to person, place, and time. She appears well-developed and well-nourished.   HENT:   Head: Normocephalic.   Eyes: Pupils are equal, round, and reactive to light.   Neck: Normal range of motion. Neck supple.   Cardiovascular: Normal rate and regular rhythm.   VAD hum   Pulmonary/Chest: Effort normal and breath sounds normal.   Abdominal: Soft. Bowel sounds are normal.   Musculoskeletal: Normal range of motion.   Neurological: She is alert and oriented to person, place, and time.   Skin: Skin is warm and dry.   Psychiatric: She has a normal mood and affect. Her behavior is normal.   Nursing note and vitals reviewed.    Significant Labs:  CBC:  Recent Labs   Lab 09/17/19  0905 09/18/19 0318 09/19/19 0416   WBC 9.29 8.56 9.58   RBC 4.03 3.61* 3.68*   HGB 11.3* 10.2* 10.3*   HCT 38.1 33.1* 34.4*   * 365* 439*   MCV 95 92 94   MCH 28.0 28.3 28.0   MCHC 29.7* 30.8* 29.9*     BNP:  Recent Labs   Lab 09/13/19  0400 09/16/19  0243   * 522*     CMP:  Recent Labs   Lab 09/17/19  0904 09/18/19 0319 09/19/19 0418   * 113* 107   CALCIUM 9.9 9.5 9.8   ALBUMIN 3.3* 3.1* 2.8*   PROT 7.4 6.6 6.5    137 134*   K 4.3 3.9 4.6   CO2 23 30* 29   CL 96 95 93*   BUN 28* 27* 26*   CREATININE 1.4 1.1 1.1   ALKPHOS 67 62 66   ALT 71* 50* 35   AST 30 18 15   BILITOT 0.7 0.7 0.6      Coagulation:   Recent Labs   Lab 09/17/19  0411 09/17/19  0904 09/18/19 0319 09/19/19 0416   INR  --  3.4* 4.2* 3.7*   APTT 39.1*  --  42.0* 47.0*     LDH:  Recent Labs   Lab 09/17/19  0411 09/18/19  0319 09/19/19  0416   * 279* 235     Microbiology:  Microbiology Results (last 7 days)     Procedure Component Value Units Date/Time    HPV High Risk Genotypes, PCR [[id number]] Collected:  09/11/19 1330    Order Status:  Completed Updated:  09/16/19 1259     HPV High Risk  type 16, PCR Negative     HPV High Risk type 18, PCR Negative     HPV other High Risk types, PCR Negative     Comment: Other HPV genotypes include:   31,33,35,39,45,51,52,56,58,59,66 and 68.         Narrative:       replaces order [id number]    Blood culture [[id number]] Collected:  09/11/19 0810    Order Status:  Completed Specimen:  Blood from Peripheral, Antecubital, Left Updated:  09/16/19 1012     Blood Culture, Routine No growth after 5 days.    Blood culture [[id number]] Collected:  09/11/19 0832    Order Status:  Completed Specimen:  Blood from Line, Jugular, Internal Right Updated:  09/16/19 1012     Blood Culture, Routine No growth after 5 days.        I have reviewed all pertinent labs within the past 24 hours.    Estimated Creatinine Clearance: 77.4 mL/min (based on SCr of 1.1 mg/dL).    Diagnostic Results:  I have reviewed all pertinent imaging results/findings within the past 24 hours.    Assessment and Plan:     No notes on file    * LVAD (left ventricular assist device) present  -HeartMate 3 Implanted 9/10/19 as DT.  -HTS Primary.  -Implanted by Dr. Walden  -Continue Coumadin, Goal INR 2.0-3.0. Therapeutic today.   -Antiplatelets  mg.  -LDH is stable overall today. Will continue to monitor daily.  -Speed set at 5200  -Not listed for OHTx.  -Stop  @ 2.5 mcg/kg/min  -Echo 9/18: LVIDD 5.69, TAPSE 0.86, AV does not open. The ventricular septum is at midline.  -Restart IVP Lasix.   -Continue Pt/OT/VAD education    Procedure: Device Interrogation Including analysis of device parameters  Current Settings: Ventricular Assist Device  Review of device function is stable/unstable stable    TXP LVAD INTERROGATIONS 9/19/2019 9/19/2019 9/19/2019 9/18/2019 9/18/2019 9/18/2019 9/18/2019   Type HeartMate3 HeartMate3 HeartMate3 HeartMate3 HeartMate3 HeartMate3 HeartMate3   Flow 3.3 3.2 3.4 3.4 3.7 3.4 3.7   Speed 5200 5200 5200 5200 5200 5200 5200   PI 5.5 5.6 5.4 5.3 5.1 5.3 5.0   Power ([place]rell[place]) 3.6 3.4 3.5 3.6  3.6 3.5 3.6   LSL 4800 - - - - - 4800   Pulsatility Intermittent pulse - - - - - Intermittent pulse       Atrial flutter  -Continue Amio/anticoagulation.     Constipation  -Continue Senna/Colace/miralax.  -Will add Mag citrate today    Pleural effusion  -Bilateral pleural effusions present, L>R.    CKD (chronic kidney disease)  -Creatinine at baseline  - Avoid nephrotoxic agents    Abnormal CT scan  -focal opacity found at right base.  DDx: atelectasis.  Recommending repeat CT in 3 months    Enlarged thyroid  Ultrasound done and showed enlarged multinodular thyroid which warrants surveillance in one year.   - TSH and fT4 wnl    History of ventricular tachycardia  -In the setting of hypokalemia  -Monitor electrolytes closely    Acute on chronic combined systolic and diastolic heart failure  NICM EF 20% s/p LVAD 9/10  RHC: done on admit 9/4/19 RA: 20/ 20/ 18 RV: 60/ 8/ 18 PA: 60/ 32/ 45 PWP: 43/ 70/ 40 . Cardiac output was 2.27 by Fitz. Cardiac index is 1.04 L/min/m2. O2 Sat: PA 34%. AO 95% PVR 2.2 PALMER    -see s/p LVAD      Ventricular tachyarrhythmia  - On amio at home, hold for now (see above)   - Goal K >4 and Mg >2  - Monitor on telemetry    ICD (implantable cardioverter-defibrillator) in place  -Medtronic CRT-D  -Patient began v-pacing at 60 and 9/14/19 began having phrenic nerve stimulation. Amiodarone held and EP consulted, device settings changed and PNS ceased.   -Underlying rhythm a flutter with slow ventricular response at 32 bpm      Americo Albert NP  Heart Transplant  [facility]

## 2019-09-19 NOTE — PT/OT/SLP PROGRESS
"Occupational Therapy   Treatment    Name: Deborah Navas  MRN: [id number]  Admitting Diagnosis:  LVAD (left ventricular assist device) present  9 Days Post-Op    Recommendations:     Discharge Recommendations: home  Discharge Equipment Recommendations:  none      Assessment:     Deborah Navas is a 48 y.o. female with a medical diagnosis of LVAD (left ventricular assist device) present. Performance deficits affecting function are weakness, impaired functional mobilty, gait instability, impaired endurance, impaired balance, impaired self care skills, pain.   Pt tolerated session fairly well. Pt continues to have decreasing LVAD flow with activity but no alarms sounded. Pt dizzy throughout session.     Rehab Prognosis:  Good; patient would benefit from acute skilled OT services to address these deficits and reach maximum level of function.       Plan:     Patient to be seen 6 x/week to address the above listed problems via self-care/home management, therapeutic activities, therapeutic exercises  · Plan of Care Expires: 10/11/19  · Plan of Care Reviewed with: patient, sibling    Subjective     Pain/Comfort:  · Pain Rating 1: (pt reports "gas pain" in abdomen. Pt unable to rate pain;)  · Pain Addressed 1: Reposition, Distraction    Objective:     Communicated with: nsg prior to session.  Pt found supine in bed with LVAD to "[employer]" power and sister present in room. Pt on 2 LPM oxygen via NC    General Precautions: Standard, LVAD, sternal, fall   Orthopedic Precautions:N/A     Occupational Performance:     Bed Mobility:    · Patient completed Supine to Sit with minimum assistance  · Patient completed Sit to Supine with minimum assistance     Functional Mobility/Transfers:  · Sit>stand with SBA    Activities of Daily Living:  Pt declined g/h skills seated EOB, but demo strength/endurance and balance needed to complete this task with SBA in sitting.      [facility] 6 Click ADL: 15    Treatment & Education:  Pt " tolerated sitting EOB approx 20 min with Fair to Fair+ sitting balance. Pt noted to have decreased LVAD flow from 3.7 supine to as low as 2.7 in sitting. Pt completed LE AROM exs to assist with increasing flow and in attempt to alleviate c/o of dizziness. Pt also educated with breathing skills and placement of UE's to avoid clutching the pillow and holding her breath.   Pt declined t/f to chair due to continued reports of dizziness.  Education provided re: OT POC and safety with functional mobility/ADL skills.     Patient left supine with all lines intact, call button in reach and sister presentEducation:    And nsg notified.    GOALS:   Multidisciplinary Problems     Occupational Therapy Goals        Problem: Occupational Therapy Goal    Goal Priority Disciplines Outcome Interventions   Occupational Therapy Goal     OT, PT/OT Ongoing (interventions implemented as appropriate)    Description:  Goals to be met by: 10/11/19     Patient will increase functional independence with ADLs by performing:    UE Dressing with [place].  LE Dressing with [place].  Grooming while standing at sink with [place].  Toileting from toilet with [place] for hygiene and clothing management.   Bathing from  shower chair/bench with [place].  Toilet transfer to toilet with [place].  Increased functional strength to WFL for B UE.  Upper extremity exercise program x15 reps per handout, with independence.                       Time Tracking:     OT Date of Treatment: 09/19/19  OT Start Time: 0900  OT Stop Time: 0931  OT Total Time (min): 31 min    Billable Minutes:Therapeutic Activity 15    JAGJIT Madison  9/19/2019

## 2019-09-20 LAB
ALBUMIN SERPL BCP-MCNC: 2.9 G/DL (ref 3.5–5.2)
ALP SERPL-CCNC: 76 U/L (ref 55–135)
ALT SERPL W/O P-5'-P-CCNC: 30 U/L (ref 10–44)
ANION GAP SERPL CALC-SCNC: 14 MMOL/L (ref 8–16)
APTT BLDCRRT: 43.1 SEC (ref 21–32)
AST SERPL-CCNC: 17 U/L (ref 10–40)
BASOPHILS # BLD AUTO: 0.05 K/UL (ref 0–0.2)
BASOPHILS NFR BLD: 0.4 % (ref 0–1.9)
BILIRUB SERPL-MCNC: 0.6 MG/DL (ref 0.1–1)
BUN SERPL-MCNC: 29 MG/DL (ref 6–20)
CALCIUM SERPL-MCNC: 9.4 MG/DL (ref 8.7–10.5)
CHLORIDE SERPL-SCNC: 91 MMOL/L (ref 95–110)
CO2 SERPL-SCNC: 31 MMOL/L (ref 23–29)
CREAT SERPL-MCNC: 1.2 MG/DL (ref 0.5–1.4)
DIFFERENTIAL METHOD: ABNORMAL
EOSINOPHIL # BLD AUTO: 0.5 K/UL (ref 0–0.5)
EOSINOPHIL NFR BLD: 3.6 % (ref 0–8)
ERYTHROCYTE [DISTWIDTH] IN BLOOD BY AUTOMATED COUNT: 14.9 % (ref 11.5–14.5)
EST. GFR  (AFRICAN AMERICAN): >60 ML/MIN/1.73 M^2
EST. GFR  (NON AFRICAN AMERICAN): 53.2 ML/MIN/1.73 M^2
GLUCOSE SERPL-MCNC: 134 MG/DL (ref 70–110)
HCT VFR BLD AUTO: 35 % (ref 37–48.5)
HGB BLD-MCNC: 10.8 G/DL (ref 12–16)
IMM GRANULOCYTES # BLD AUTO: 0.19 K/UL (ref 0–0.04)
IMM GRANULOCYTES NFR BLD AUTO: 1.5 % (ref 0–0.5)
INR PPP: 3.6 (ref 0.8–1.2)
LDH SERPL L TO P-CCNC: 261 U/L (ref 110–260)
LYMPHOCYTES # BLD AUTO: 0.9 K/UL (ref 1–4.8)
LYMPHOCYTES NFR BLD: 7.3 % (ref 18–48)
MAGNESIUM SERPL-MCNC: 2.4 MG/DL (ref 1.6–2.6)
MCH RBC QN AUTO: 28.1 PG (ref 27–31)
MCHC RBC AUTO-ENTMCNC: 30.9 G/DL (ref 32–36)
MCV RBC AUTO: 91 FL (ref 82–98)
MONOCYTES # BLD AUTO: 1.1 K/UL (ref 0.3–1)
MONOCYTES NFR BLD: 9 % (ref 4–15)
NEUTROPHILS # BLD AUTO: 9.7 K/UL (ref 1.8–7.7)
NEUTROPHILS NFR BLD: 78.2 % (ref 38–73)
NRBC BLD-RTO: 0 /100 WBC
PLATELET # BLD AUTO: 472 K/UL (ref 150–350)
PMV BLD AUTO: 9.3 FL (ref 9.2–12.9)
POTASSIUM SERPL-SCNC: 4.3 MMOL/L (ref 3.5–5.1)
PROT SERPL-MCNC: 6.8 G/DL (ref 6–8.4)
PROTHROMBIN TIME: 35.1 SEC (ref 9–12.5)
RBC # BLD AUTO: 3.84 M/UL (ref 4–5.4)
SODIUM SERPL-SCNC: 136 MMOL/L (ref 136–145)
WBC # BLD AUTO: 12.44 K/UL (ref 3.9–12.7)

## 2019-09-20 PROCEDURE — 27000248 HC VAD-ADDITIONAL DAY: Mod: NTX

## 2019-09-20 PROCEDURE — 97535 SELF CARE MNGMENT TRAINING: CPT | Mod: NTX

## 2019-09-20 PROCEDURE — 94761 N-INVAS EAR/PLS OXIMETRY MLT: CPT | Mod: NTX

## 2019-09-20 PROCEDURE — 93750 PR INTERROGATE VENT ASSIST DEV, IN PERSON, W PHYSICIAN ANALYSIS: ICD-10-PCS | Mod: NTX,,, | Performed by: INTERNAL MEDICINE

## 2019-09-20 PROCEDURE — 97112 NEUROMUSCULAR REEDUCATION: CPT | Mod: NTX

## 2019-09-20 PROCEDURE — 85610 PROTHROMBIN TIME: CPT | Mod: NTX

## 2019-09-20 PROCEDURE — 25000003 PHARM REV CODE 250: Mod: NTX | Performed by: STUDENT IN AN ORGANIZED HEALTH CARE EDUCATION/TRAINING PROGRAM

## 2019-09-20 PROCEDURE — 99233 SBSQ HOSP IP/OBS HIGH 50: CPT | Mod: NTX,,, | Performed by: INTERNAL MEDICINE

## 2019-09-20 PROCEDURE — 20600001 HC STEP DOWN PRIVATE ROOM: Mod: NTX

## 2019-09-20 PROCEDURE — 83735 ASSAY OF MAGNESIUM: CPT | Mod: NTX

## 2019-09-20 PROCEDURE — 36415 COLL VENOUS BLD VENIPUNCTURE: CPT | Mod: NTX

## 2019-09-20 PROCEDURE — 25000003 PHARM REV CODE 250: Mod: NTX | Performed by: INTERNAL MEDICINE

## 2019-09-20 PROCEDURE — 85025 COMPLETE CBC W/AUTO DIFF WBC: CPT | Mod: NTX

## 2019-09-20 PROCEDURE — 63600175 PHARM REV CODE 636 W HCPCS: Mod: NTX | Performed by: NURSE PRACTITIONER

## 2019-09-20 PROCEDURE — 97530 THERAPEUTIC ACTIVITIES: CPT | Mod: NTX

## 2019-09-20 PROCEDURE — 99233 PR SUBSEQUENT HOSPITAL CARE,LEVL III: ICD-10-PCS | Mod: NTX,,, | Performed by: INTERNAL MEDICINE

## 2019-09-20 PROCEDURE — 25000003 PHARM REV CODE 250: Mod: NTX | Performed by: THORACIC SURGERY (CARDIOTHORACIC VASCULAR SURGERY)

## 2019-09-20 PROCEDURE — 93750 INTERROGATION VAD IN PERSON: CPT | Mod: NTX,,, | Performed by: INTERNAL MEDICINE

## 2019-09-20 PROCEDURE — 25000003 PHARM REV CODE 250: Mod: NTX | Performed by: NURSE PRACTITIONER

## 2019-09-20 PROCEDURE — 85730 THROMBOPLASTIN TIME PARTIAL: CPT | Mod: NTX

## 2019-09-20 PROCEDURE — 80053 COMPREHEN METABOLIC PANEL: CPT | Mod: NTX

## 2019-09-20 PROCEDURE — 83615 LACTATE (LD) (LDH) ENZYME: CPT | Mod: NTX

## 2019-09-20 RX ORDER — PSEUDOEPHEDRINE/ACETAMINOPHEN 30MG-500MG
100 TABLET ORAL DAILY PRN
Status: DISCONTINUED | OUTPATIENT
Start: 2019-09-20 | End: 2019-10-01 | Stop reason: HOSPADM

## 2019-09-20 RX ORDER — ADHESIVE BANDAGE
30 BANDAGE TOPICAL 2 TIMES DAILY PRN
Status: DISCONTINUED | OUTPATIENT
Start: 2019-09-20 | End: 2019-10-01 | Stop reason: HOSPADM

## 2019-09-20 RX ORDER — SYRING-NEEDL,DISP,INSUL,0.3 ML 29 G X1/2"
296 SYRINGE, EMPTY DISPOSABLE MISCELLANEOUS DAILY PRN
Status: DISCONTINUED | OUTPATIENT
Start: 2019-09-20 | End: 2019-10-01 | Stop reason: HOSPADM

## 2019-09-20 RX ORDER — FUROSEMIDE 10 MG/ML
60 INJECTION INTRAMUSCULAR; INTRAVENOUS 2 TIMES DAILY
Status: DISCONTINUED | OUTPATIENT
Start: 2019-09-20 | End: 2019-09-23

## 2019-09-20 RX ADMIN — MAGNESIUM HYDROXIDE 2400 MG: 400 SUSPENSION ORAL at 09:09

## 2019-09-20 RX ADMIN — ASPIRIN 325 MG: 325 TABLET, COATED ORAL at 08:09

## 2019-09-20 RX ADMIN — RAMELTEON 8 MG: 8 TABLET ORAL at 08:09

## 2019-09-20 RX ADMIN — SENNOSIDES AND DOCUSATE SODIUM 2 TABLET: 8.6; 5 TABLET ORAL at 08:09

## 2019-09-20 RX ADMIN — FUROSEMIDE 60 MG: 10 INJECTION, SOLUTION INTRAMUSCULAR; INTRAVENOUS at 08:09

## 2019-09-20 RX ADMIN — OXYCODONE HYDROCHLORIDE 20 MG: 20 TABLET, FILM COATED, EXTENDED RELEASE ORAL at 08:09

## 2019-09-20 RX ADMIN — GABAPENTIN 400 MG: 100 CAPSULE ORAL at 08:09

## 2019-09-20 RX ADMIN — PANTOPRAZOLE SODIUM 40 MG: 40 TABLET, DELAYED RELEASE ORAL at 08:09

## 2019-09-20 RX ADMIN — SIMETHICONE CHEW TAB 80 MG 80 MG: 80 TABLET ORAL at 10:09

## 2019-09-20 RX ADMIN — POLYETHYLENE GLYCOL 3350 17 G: 17 POWDER, FOR SOLUTION ORAL at 08:09

## 2019-09-20 RX ADMIN — MIRTAZAPINE 15 MG: 15 TABLET, FILM COATED ORAL at 08:09

## 2019-09-20 RX ADMIN — SIMETHICONE CHEW TAB 80 MG 80 MG: 80 TABLET ORAL at 08:09

## 2019-09-20 RX ADMIN — FUROSEMIDE 60 MG: 10 INJECTION, SOLUTION INTRAMUSCULAR; INTRAVENOUS at 05:09

## 2019-09-20 RX ADMIN — VENLAFAXINE 150 MG: 37.5 TABLET ORAL at 08:09

## 2019-09-20 RX ADMIN — SIMETHICONE CHEW TAB 80 MG 80 MG: 80 TABLET ORAL at 09:09

## 2019-09-20 RX ADMIN — SIMETHICONE CHEW TAB 80 MG 80 MG: 80 TABLET ORAL at 01:09

## 2019-09-20 NOTE — PLAN OF CARE
Problem: Adult Inpatient Plan of Care  Goal: Plan of Care Review  Outcome: Ongoing (interventions implemented as appropriate)  Patient free from falls and injuries throughout shift.  AAO and VSS.  Patient denies chest pain and SOB.  Patient continues on oxycotin 40 mg PO [id number]. BUN/Cre 27/1.1.  No BM since 09/16.  Fonseca in place working WNL.  LVAD working WNL; dressing change due 9/20.  No acute events overnight. Patient resting well at this time.  Plan of care discussed with patient.  Patient verbalizes understanding.  Will continue to monitor.

## 2019-09-20 NOTE — PT/OT/SLP PROGRESS
Physical Therapy Treatment    Patient Name:  Deborah Navas   MRN:  [id number]    Recommendations:     Discharge Recommendations:  home   Discharge Equipment Recommendations: none   Barriers to discharge: Decreased caregiver support at current functional level    Assessment:     Deborah Navas is a 48 y.o. female admitted with a medical diagnosis of LVAD (left ventricular assist device) present.  She presents with the following impairments/functional limitations:  weakness, impaired self care skills, impaired balance, impaired endurance, impaired functional mobilty, decreased coordination, gait instability, impaired cardiopulmonary response to activity, pain, decreased safety awareness. Pt tolerated standing trials this date but with reports of dizziness and nausea throughout session. Able to complete sit<>stand transfers with min-CGA from standard chair height while maintaining sternal precautions. Decreased standing balance with increased postural sway noted upon standing, requiring assist throughout for stability and safety. Pt would continue to benefit from skilled acute PT in order to address current deficits and progress functional mobility.     Rehab Prognosis: Good; patient would benefit from acute skilled PT services to address these deficits and reach maximum level of function.    Recent Surgery: Procedure(s) (LRB):  INSERTION-LEFT VENTRICULAR ASSIST DEVICE (N/A)  INSERTION, GRAFT, PERICARDIUM  CLOSURE, WOUND, STERNUM 10 Days Post-Op    Plan:     During this hospitalization, patient to be seen 6 x/week to address the identified rehab impairments via gait training, therapeutic activities, therapeutic exercises, neuromuscular re-education and progress toward the following goals:    · Plan of Care Expires:  10/10/19    Subjective     Chief Complaint: dizziness, back pain, nausea  Patient/Family Comments/goals: return home  Pain/Comfort:  Pain Rating 1: (reported back pain; did not rate)  Pain  Addressed 1: Reposition, Distraction      Objective:     Communicated with RN prior to session.  Patient found up in chair with wound vac, telemetry, LVAD, parrish catheter, oxygen upon PT entry to room.     General Precautions: Standard, fall, LVAD, sternal   Orthopedic Precautions:N/A   Braces: N/A     Functional Mobility:  · Transfers:     · Sit to Stand:  x3 reps with [place]otah and x1 rep with CGA from bedside chair with no AD  · With cues for maintaining sternal precautions, anterior weight-shifts, and use of forward momentum for increased ease of transfer   · Gait: marches in place x1 rep each LE with HHAx2 and minAx2  · Cues for appropriate weight-shifts, technique, and floor clearance   · Balance:   · static standing: CGA-Dakotah    · dynamic standing: Dakotah-minAx2      AM-PAC 6 CLICK MOBILITY  Turning over in bed (including adjusting bedclothes, sheets and blankets)?: 3  Sitting down on and standing up from a chair with arms (e.g., wheelchair, bedside commode, etc.): 3  Moving from lying on back to sitting on the side of the bed?: 3  Moving to and from a bed to a chair (including a wheelchair)?: 3  Need to walk in hospital room?: 1  Climbing 3-5 steps with a railing?: 1  Basic Mobility Total Score: 14       Therapeutic Activities and Exercises:  Pt found on wall power and remained on wall power throughout session. Controller donned around neck throughout session. LVAD education performed (see OT note for further details).  Completed standing trials x3 with Dakotah-minAx2 and HHAx2. Dizziness reported throughout, remained in both sitting and standing. Cues for pursed lip breathing technique and focus on static object. Increased postural sway noted. Cues for increased glute and quad activation, upright posture, and forward gaze. Pump flow monitored throughout standing trials- 3.6 upon PT entry; mostly 3.0 throughout; decreased to 2.6 during initial standing trial, increased upon sitting. Performed medial-lateral  weight-shifts in B directions. Performed marches in place x1 rep each LE. Standing tolerance limited 2* dizziness and nausea.     Patient left up in chair with all lines intact, call button in reach and NP notified..    GOALS:   Multidisciplinary Problems     Physical Therapy Goals        Problem: Physical Therapy Goal    Goal Priority Disciplines Outcome Goal Variances Interventions   Physical Therapy Goal     PT, PT/OT Ongoing (interventions implemented as appropriate)     Description:  Goals to be met by: 10/1/2019    Patient will increase functional independence with mobility by performin. Supine to sit with Contact Guard Assistance - not met  2. Sit to stand transfer with Supervision - not met  3. Gait  x 200 feet with Supervision  - not met  4. Ascend/descend 2 stair with Contact Guard Assistance - not met                      Time Tracking:     PT Received On: 19  PT Start Time: 902     PT Stop Time: 930  PT Total Time (min): 28 min     Billable Minutes: Therapeutic Activity 8 and Neuromuscular Re-education 15   (co-tx with OT)    Treatment Type: Treatment  PT/PTA: PT     PTA Visit Number: 0     Alecia Pace PT, DPT   2019  [phone]

## 2019-09-20 NOTE — PLAN OF CARE
Problem: Adult Inpatient Plan of Care  Goal: Plan of Care Review  Outcome: Ongoing (interventions implemented as appropriate)  Patient free from falls and injuries throughout shift.  AAO and VSS.  Patient denies chest pain and SOB.  Patient continues on oxycotin 20 mg PO [id number].  Patient started on mirtazipine 15mg PO nightly and given lasix x1 IVP.  BUN/Cre 26/1.1.  No BM since 09/16.  Fonseca in place working WNL.  LVAD working WNL; dressing change due 9/20.  No acute events overnight. Patient resting well at this time.  Plan of care discussed with patient.  Patient verbalizes understanding.  Will continue to monitor.

## 2019-09-20 NOTE — PT/OT/SLP PROGRESS
Occupational Therapy   Treatment    Name: Deborah Navas  MRN: [id number]  Admitting Diagnosis:  LVAD (left ventricular assist device) present  10 Days Post-Op    Recommendations:     Discharge Recommendations: home  Discharge Equipment Recommendations:  none      Assessment:     Deborah Navas is a 48 y.o. female with a medical diagnosis of LVAD (left ventricular assist device) present. . Performance deficits affecting function are weakness, gait instability, impaired balance, impaired endurance, impaired self care skills, impaired functional mobilty, decreased safety awareness, pain. Pt tolerated session fairly well. Improved tolerance for activity this date; however, pt continues to need constant encouragement throughout session     Rehab Prognosis:  Good; patient would benefit from acute skilled OT services to address these deficits and reach maximum level of function.       Plan:     Patient to be seen 6 x/week to address the above listed problems via self-care/home management, therapeutic activities, therapeutic exercises  · Plan of Care Expires: 10/11/19  · Plan of Care Reviewed with: patient    Subjective     Pt reporting dizziness and nausea during session.     Pain/Comfort:  · Pain Rating 1: (pt reports gas pain but did not rate)  · Pain Addressed 1: Reposition, Distraction    Objective:     Communicated with: nsg prior to session.    Pt found in chair with LVAD flow 3.6 reclined in chair to 2.7 with sitting and standing. Flow remained mostly at 3.0 during session.      General Precautions: Standard, LVAD, sternal, fall   Orthopedic Precautions:N/A       Occupational Performance:     Functional Mobility/Transfers:  · 4 standing trials completed. 3 trials with MIN A and one trial with CGA.  · Cues needed for breathing and full upright posture.     Activities of Daily Living:  · G/H: seated with set-up  · UE dressing: MAX A       AMPAC 6 Click ADL: 15    Treatment & Education:  Pt able to  perform self test with cues for initiation and assist to document numbers in binder. OT provided education re: battery rotation and pt/sister verb understanding.  OT provided education re: OT POC and goals which include progressing to dressing skills on 9/23/19. Education provided re: breathing techniques and progressing of activity.  Education provided re: OT POC and safety with functional mobility/ADL skills.       Patient left up in chair with all lines intact and call button in reachEducation:      GOALS:   Multidisciplinary Problems     Occupational Therapy Goals        Problem: Occupational Therapy Goal    Goal Priority Disciplines Outcome Interventions   Occupational Therapy Goal     OT, PT/OT Ongoing (interventions implemented as appropriate)    Description:  Goals to be met by: 10/11/19     Patient will increase functional independence with ADLs by performing:    UE Dressing with Esmeralda.  LE Dressing with Esmeralda.  Grooming while standing at sink with Esmeralda.  Toileting from toilet with Esmeralda for hygiene and clothing management.   Bathing from  shower chair/bench with Esmeralda.  Toilet transfer to toilet with Esmeralda.  Increased functional strength to WFL for B UE.  Upper extremity exercise program x15 reps per handout, with independence.                       Time Tracking:     OT Date of Treatment: 09/20/19  OT Start Time: 0903  OT Stop Time: 0937  OT Total Time (min): 34 min    Billable Minutes:Self Care/Home Management 15  Therapeutic Activity 15    JAGJIT Madison  9/20/2019

## 2019-09-20 NOTE — PLAN OF CARE
Problem: Occupational Therapy Goal  Goal: Occupational Therapy Goal  Goals to be met by: 10/11/19     Patient will increase functional independence with ADLs by performing:    UE Dressing with [place].  LE Dressing with [place].  Grooming while standing at sink with [place].  Toileting from toilet with [place] for hygiene and clothing management.   Bathing from  shower chair/bench with [place].  Toilet transfer to toilet with [place].  Increased functional strength to WFL for B UE.  Upper extremity exercise program x15 reps per handout, with independence.      Goals and POC remain appropriate.

## 2019-09-20 NOTE — PHYSICIAN QUERY
PT Name: Deborah Navas  MR #: [id number]    Physician Query Form - Atrial Flutter Specificity Clarification     CDS/: Sadia Leslie RN, CCDS             Contact information: [email or web address]  This form is a permanent document in the medical record.     Query Date: September 20, 2019    By submitting this query, we are merely seeking further clarification of documentation. Please utilize your independent clinical judgment when addressing the question(s) below.    The medical record contains the following:   Indicators     Supporting Clinical Findings Location in Medical Record   X Atrial Flutter Underlying rhythm a flutter with slow ventricular response at 32 bpm    Atrial Flutter    Paced/ flutter on tele 9/16- 9/18 prog note      9/19- 9/20 prog notes    9/20 nurse note    EKG results     X Medication Continue Amio/anticoagulation   9/20 prog note   X Treatment CRT-D-Patient began v-pacing at 59 and 9/14/19 began having phrenic nerve stimulation. Amiodarone held and EP consulted, device settings changed and PNS ceased.  9/16- 9/18 prog note    Other       Provider, please further specify the Atrial Flutter diagnosis.    [   ] Atypical   [   x] Typical   [   ] Other (please specify):   [  ] Clinically Undetermined         Please document in your progress notes daily for the duration of treatment until resolved, and include in your discharge summary.

## 2019-09-20 NOTE — SUBJECTIVE & OBJECTIVE
Interval History: Slept better last night. Had BM but still feels constipated. Still with dizziness.     Scheduled Meds:   aspirin  325 mg Oral Daily    furosemide  60 mg Intravenous BID    gabapentin  400 mg Oral BID    mirtazapine  15 mg Oral QHS    oxyCODONE  20 mg Oral Q12H    pantoprazole  40 mg Oral Daily    polyethylene glycol  17 g Oral Daily    ramelteon  8 mg Oral QHS    senna-docusate 8.6-50 mg  2 tablet Oral BID    simethicone  1 tablet Oral QID (PC + HS)    venlafaxine  150 mg Oral Daily     PRN Meds:albuterol sulfate, bisacodyl, docusate sodium, hydrocortisone, hydrOXYzine HCl, magnesium hydroxide 400 mg/5 ml, magnesium sulfate IVPB, ondansetron, oxyCODONE, oxyCODONE-acetaminophen, promethazine (PHENERGAN) IVPB, simethicone, sodium chloride 0.9%    Review of patient's allergies indicates:   Allergen Reactions    Adhesive Blisters     Reaction to area in chest and up only    Codeine Itching     Objective:     Vital Signs (Most Recent):  Temp: 97.6 °F (36.4 °C) (09/20/19 0826)  Pulse: (!) 55 (09/20/19 0826)  Resp: 16 (09/20/19 0826)  BP: (!) 90/0 (09/20/19 0826)  SpO2: (!) 94 % (09/20/19 0826) Vital Signs (24h Range):  Temp:  [97.1 °F (36.2 °C)-98.4 °F (36.9 °C)] 97.6 °F (36.4 °C)  Pulse:  [43-68] 55  Resp:  [16-18] 16  SpO2:  [90 %-98 %] 94 %  BP: ()/(0-71) 90/0     Patient Vitals for the past 72 hrs (Last 3 readings):   Weight   09/20/19 0837 106.1 kg (233 lb 14.5 oz)   09/18/19 1200 105.7 kg (233 lb)   09/17/19 1000 106.1 kg (233 lb 14.5 oz)     Body mass index is 36.64 kg/m².      Intake/Output Summary (Last 24 hours) at 9/20/2019 0953  Last data filed at 9/20/2019 0000  Gross per 24 hour   Intake 360 ml   Output 800 ml   Net -440 ml        Telemetry: Aflutter.     Physical Exam   Constitutional: She is oriented to person, place, and time. She appears well-developed and well-nourished.   HENT:   Head: Normocephalic.   Eyes: Pupils are equal, round, and reactive to light.   Neck:  Normal range of motion. Neck supple.   Cardiovascular: Normal rate and regular rhythm.   VAD hum   Pulmonary/Chest: Effort normal and breath sounds normal.   Abdominal: Soft. Bowel sounds are normal.   Musculoskeletal: Normal range of motion.   Neurological: She is alert and oriented to person, place, and time.   Skin: Skin is warm and dry.   Psychiatric: She has a normal mood and affect. Her behavior is normal.   Nursing note and vitals reviewed.    Significant Labs:  CBC:  Recent Labs   Lab 09/18/19 0318 09/19/19 0416 09/20/19  0256   WBC 8.56 9.58 12.44   RBC 3.61* 3.68* 3.84*   HGB 10.2* 10.3* 10.8*   HCT 33.1* 34.4* 35.0*   * 439* 472*   MCV 92 94 91   MCH 28.3 28.0 28.1   MCHC 30.8* 29.9* 30.9*     BNP:  Recent Labs   Lab 09/16/19  0243   *     CMP:  Recent Labs   Lab 09/18/19 0319 09/19/19 0418 09/20/19  0256   * 107 134*   CALCIUM 9.5 9.8 9.4   ALBUMIN 3.1* 2.8* 2.9*   PROT 6.6 6.5 6.8    134* 136   K 3.9 4.6 4.3   CO2 30* 29 31*   CL 95 93* 91*   BUN 27* 26* 29*   CREATININE 1.1 1.1 1.2   ALKPHOS 62 66 76   ALT 50* 35 30   AST 18 15 17   BILITOT 0.7 0.6 0.6      Coagulation:   Recent Labs   Lab 09/18/19 0319 09/19/19 0416 09/20/19  0256   INR 4.2* 3.7* 3.6*   APTT 42.0* 47.0* 43.1*     LDH:  Recent Labs   Lab 09/18/19 0319 09/19/19 0416 09/20/19  0256   * 235 261*     Microbiology:  Microbiology Results (last 7 days)     Procedure Component Value Units Date/Time    HPV High Risk Genotypes, PCR [[id number]] Collected:  09/11/19 1330    Order Status:  Completed Updated:  09/16/19 1259     HPV High Risk type 16, PCR Negative     HPV High Risk type 18, PCR Negative     HPV other High Risk types, PCR Negative     Comment: Other HPV genotypes include:   31,33,35,39,45,51,52,56,58,59,66 and 68.         Narrative:       replaces order [id number]    Blood culture [[id number]] Collected:  09/11/19 0810    Order Status:  Completed Specimen:  Blood from Peripheral, Antecubital,  Left Updated:  09/16/19 1012     Blood Culture, Routine No growth after 5 days.    Blood culture [[id number]] Collected:  09/11/19 0832    Order Status:  Completed Specimen:  Blood from Line, Jugular, Internal Right Updated:  09/16/19 1012     Blood Culture, Routine No growth after 5 days.        I have reviewed all pertinent labs within the past 24 hours.    Estimated Creatinine Clearance: 71.1 mL/min (based on SCr of 1.2 mg/dL).    Diagnostic Results:  I have reviewed all pertinent imaging results/findings within the past 24 hours.

## 2019-09-20 NOTE — PLAN OF CARE
Problem: Physical Therapy Goal  Goal: Physical Therapy Goal  Goals to be met by: 10/1/2019    Patient will increase functional independence with mobility by performin. Supine to sit with Contact Guard Assistance - not met  2. Sit to stand transfer with Supervision - not met  3. Gait  x 200 feet with Supervision  - not met  4. Ascend/descend 2 stair with Contact Guard Assistance - not met     Outcome: Ongoing (interventions implemented as appropriate)  Goals reviewed and remain appropriate. Pt progressing towards goals.    Alecia Pace, PT, DPT   2019  [phone]

## 2019-09-20 NOTE — ASSESSMENT & PLAN NOTE
-HeartMate 3 Implanted 9/10/19 as DT.  -HTS Primary.  -Implanted by Dr. Walden  -INR SupraTherapeutic today. Goal INR 2.0-3.0. Hold Coumadin.  -Antiplatelets  mg.  -LDH is stable overall today. Will continue to monitor daily.  -Speed set at 5200  -Not listed for OHTx.  - weaned off.   -Echo 9/18: LVIDD 5.69, TAPSE 0.86, AV does not open. The ventricular septum is at midline.  -Continue IVP Lasix.   -If dizziness/lightheadeness continues, may need to consider RHC next week.   -Continue Pt/OT/VAD education    Procedure: Device Interrogation Including analysis of device parameters  Current Settings: Ventricular Assist Device  Review of device function is stable/unstable stable    TXP LVAD INTERROGATIONS 9/20/2019 9/20/2019 9/19/2019 9/19/2019 9/19/2019 9/19/2019 9/19/2019   Type HeartMate3 HeartMate3 HeartMate3 HeartMate3 HeartMate3 HeartMate3 HeartMate3   Flow 3.5 3.6 3.7 3.5 3.3 3.3 3.2   Speed 5200 5200 5200 5200 5200 5200 5200   PI 4.6 5.1 4.4 4.6 5.6 5.5 5.6   Power ([place]) 3.6 3.6 3.5 3.5 3.6 3.6 3.4   LSL 4800 - - - - 4800 -   Pulsatility Intermittent pulse - - - - Intermittent pulse -

## 2019-09-20 NOTE — PROGRESS NOTES
09/20/2019  Terra Porter    Current provider:  Jolene Lua MD      I, Terra Porter, rounded on Deborah Oliva Navas to ensure all mechanical assist device settings (IABP or VAD) were appropriate and all parameters were within limits.  I was able to ensure all back up equipment was present, the staff had no issues, and the Perfusion Department daily rounding was complete.    11:38 AM

## 2019-09-20 NOTE — PROGRESS NOTES
[facility]  Heart Transplant  Progress Note    Patient Name: Deborah Navas  MRN: [id number]  Admission Date: 9/4/2019  Hospital Length of Stay: 16 days  Attending Physician: Jolene Lua MD  Primary Care Provider: Primary Doctor No  Principal Problem:LVAD (left ventricular assist device) present    Subjective:     Interval History: Slept better last night. Had BM but still feels constipated. Still with dizziness.     Scheduled Meds:   aspirin  325 mg Oral Daily    furosemide  60 mg Intravenous BID    gabapentin  400 mg Oral BID    mirtazapine  15 mg Oral QHS    oxyCODONE  20 mg Oral Q12H    pantoprazole  40 mg Oral Daily    polyethylene glycol  17 g Oral Daily    ramelteon  8 mg Oral QHS    senna-docusate 8.6-50 mg  2 tablet Oral BID    simethicone  1 tablet Oral QID (PC + HS)    venlafaxine  150 mg Oral Daily     PRN Meds:albuterol sulfate, bisacodyl, docusate sodium, hydrocortisone, hydrOXYzine HCl, magnesium hydroxide 400 mg/5 ml, magnesium sulfate IVPB, ondansetron, oxyCODONE, oxyCODONE-acetaminophen, promethazine (PHENERGAN) IVPB, simethicone, sodium chloride 0.9%    Review of patient's allergies indicates:   Allergen Reactions    Adhesive Blisters     Reaction to area in chest and up only    Codeine Itching     Objective:     Vital Signs (Most Recent):  Temp: 97.6 °F (36.4 °C) (09/20/19 0826)  Pulse: (!) 55 (09/20/19 0826)  Resp: 16 (09/20/19 0826)  BP: (!) 90/0 (09/20/19 0826)  SpO2: (!) 94 % (09/20/19 0826) Vital Signs (24h Range):  Temp:  [97.1 °F (36.2 °C)-98.4 °F (36.9 °C)] 97.6 °F (36.4 °C)  Pulse:  [43-68] 55  Resp:  [16-18] 16  SpO2:  [90 %-98 %] 94 %  BP: ()/(0-71) 90/0     Patient Vitals for the past 72 hrs (Last 3 readings):   Weight   09/20/19 0837 106.1 kg (233 lb 14.5 oz)   09/18/19 1200 105.7 kg (233 lb)   09/17/19 1000 106.1 kg (233 lb 14.5 oz)     Body mass index is 36.64 kg/m².      Intake/Output Summary (Last 24 hours) at 9/20/2019  0953  Last data filed at 9/20/2019 0000  Gross per 24 hour   Intake 360 ml   Output 800 ml   Net -440 ml        Telemetry: Aflutter.     Physical Exam   Constitutional: She is oriented to person, place, and time. She appears well-developed and well-nourished.   HENT:   Head: Normocephalic.   Eyes: Pupils are equal, round, and reactive to light.   Neck: Normal range of motion. Neck supple.   Cardiovascular: Normal rate and regular rhythm.   VAD hum   Pulmonary/Chest: Effort normal and breath sounds normal.   Abdominal: Soft. Bowel sounds are normal.   Musculoskeletal: Normal range of motion.   Neurological: She is alert and oriented to person, place, and time.   Skin: Skin is warm and dry.   Psychiatric: She has a normal mood and affect. Her behavior is normal.   Nursing note and vitals reviewed.    Significant Labs:  CBC:  Recent Labs   Lab 09/18/19 0318 09/19/19 0416 09/20/19 0256   WBC 8.56 9.58 12.44   RBC 3.61* 3.68* 3.84*   HGB 10.2* 10.3* 10.8*   HCT 33.1* 34.4* 35.0*   * 439* 472*   MCV 92 94 91   MCH 28.3 28.0 28.1   MCHC 30.8* 29.9* 30.9*     BNP:  Recent Labs   Lab 09/16/19  0243   *     CMP:  Recent Labs   Lab 09/18/19 0319 09/19/19 0418 09/20/19  0256   * 107 134*   CALCIUM 9.5 9.8 9.4   ALBUMIN 3.1* 2.8* 2.9*   PROT 6.6 6.5 6.8    134* 136   K 3.9 4.6 4.3   CO2 30* 29 31*   CL 95 93* 91*   BUN 27* 26* 29*   CREATININE 1.1 1.1 1.2   ALKPHOS 62 66 76   ALT 50* 35 30   AST 18 15 17   BILITOT 0.7 0.6 0.6      Coagulation:   Recent Labs   Lab 09/18/19 0319 09/19/19 0416 09/20/19  0256   INR 4.2* 3.7* 3.6*   APTT 42.0* 47.0* 43.1*     LDH:  Recent Labs   Lab 09/18/19 0319 09/19/19  0416 09/20/19  0256   * 235 261*     Microbiology:  Microbiology Results (last 7 days)     Procedure Component Value Units Date/Time    HPV High Risk Genotypes, PCR [[id number]] Collected:  09/11/19 1330    Order Status:  Completed Updated:  09/16/19 1259     HPV High Risk type 16, PCR  Negative     HPV High Risk type 18, PCR Negative     HPV other High Risk types, PCR Negative     Comment: Other HPV genotypes include:   31,33,35,39,45,51,52,56,58,59,66 and 68.         Narrative:       replaces order [id number]    Blood culture [[id number]] Collected:  09/11/19 0810    Order Status:  Completed Specimen:  Blood from Peripheral, Antecubital, Left Updated:  09/16/19 1012     Blood Culture, Routine No growth after 5 days.    Blood culture [[id number]] Collected:  09/11/19 0832    Order Status:  Completed Specimen:  Blood from Line, Jugular, Internal Right Updated:  09/16/19 1012     Blood Culture, Routine No growth after 5 days.        I have reviewed all pertinent labs within the past 24 hours.    Estimated Creatinine Clearance: 71.1 mL/min (based on SCr of 1.2 mg/dL).    Diagnostic Results:  I have reviewed all pertinent imaging results/findings within the past 24 hours.    Assessment and Plan:     No notes on file    * LVAD (left ventricular assist device) present  -HeartMate 3 Implanted 9/10/19 as DT.  -HTS Primary.  -Implanted by Dr. Walden  -INR SupraTherapeutic today. Goal INR 2.0-3.0. Hold Coumadin.  -Antiplatelets  mg.  -LDH is stable overall today. Will continue to monitor daily.  -Speed set at 5200  -Not listed for OHTx.  - weaned off.   -Echo 9/18: LVIDD 5.69, TAPSE 0.86, AV does not open. The ventricular septum is at midline.  -Continue IVP Lasix.   -If dizziness/lightheadeness continues, may need to consider RHC next week.   -Continue Pt/OT/VAD education    Procedure: Device Interrogation Including analysis of device parameters  Current Settings: Ventricular Assist Device  Review of device function is stable/unstable stable    TXP LVAD INTERROGATIONS 9/20/2019 9/20/2019 9/19/2019 9/19/2019 9/19/2019 9/19/2019 9/19/2019   Type HeartMate3 HeartMate3 HeartMate3 HeartMate3 HeartMate3 HeartMate3 HeartMate3   Flow 3.5 3.6 3.7 3.5 3.3 3.3 3.2   Speed 5200 5200 5200 5200 5200 5200 5200    PI 4.6 5.1 4.4 4.6 5.6 5.5 5.6   Power (Orellana) 3.6 3.6 3.5 3.5 3.6 3.6 3.4   LSL 4800 - - - - 4800 -   Pulsatility Intermittent pulse - - - - Intermittent pulse -       Atrial flutter  -Continue Amio/anticoagulation.     Constipation  -Continue Senna/Colace/miralax.  -Add prn milk of Mag today.     Pleural effusion  -Bilateral pleural effusions present, L>R.    CKD (chronic kidney disease)  -Creatinine at baseline  - Avoid nephrotoxic agents    Abnormal CT scan  -focal opacity found at right base.  DDx: atelectasis.  Recommending repeat CT in 3 months    Enlarged thyroid  Ultrasound done and showed enlarged multinodular thyroid which warrants surveillance in one year.   - TSH and fT4 wnl    History of ventricular tachycardia  -In the setting of hypokalemia  -Monitor electrolytes closely    Acute on chronic combined systolic and diastolic heart failure  NICM EF 20% s/p LVAD 9/10  RHC: done on admit 9/4/19 RA: 20/ 20/ 18 RV: 60/ 8/ 18 PA: 60/ 32/ 45 PWP: 43/ 70/ 40 . Cardiac output was 2.27 by Fitz. Cardiac index is 1.04 L/min/m2. O2 Sat: PA 34%. AO 95% PVR 2.2 PALMER    -see s/p LVAD      Ventricular tachyarrhythmia  - On amio at home, hold for now (see above)   - Goal K >4 and Mg >2  - Monitor on telemetry    ICD (implantable cardioverter-defibrillator) in place  -Medtronic CRT-D  -Patient began v-pacing at 60 and 9/14/19 began having phrenic nerve stimulation. Amiodarone held and EP consulted, device settings changed and PNS ceased.   -Underlying rhythm a flutter with slow ventricular response at 32 bpm      Americo Albert NP  Heart Transplant  [facility]

## 2019-09-21 LAB
ALBUMIN SERPL BCP-MCNC: 2.7 G/DL (ref 3.5–5.2)
ALP SERPL-CCNC: 74 U/L (ref 55–135)
ALT SERPL W/O P-5'-P-CCNC: 22 U/L (ref 10–44)
ANION GAP SERPL CALC-SCNC: 10 MMOL/L (ref 8–16)
APTT BLDCRRT: 44.5 SEC (ref 21–32)
AST SERPL-CCNC: 17 U/L (ref 10–40)
BASOPHILS # BLD AUTO: 0.06 K/UL (ref 0–0.2)
BASOPHILS NFR BLD: 0.4 % (ref 0–1.9)
BILIRUB SERPL-MCNC: 0.5 MG/DL (ref 0.1–1)
BUN SERPL-MCNC: 32 MG/DL (ref 6–20)
CALCIUM SERPL-MCNC: 9.3 MG/DL (ref 8.7–10.5)
CHLORIDE SERPL-SCNC: 89 MMOL/L (ref 95–110)
CO2 SERPL-SCNC: 35 MMOL/L (ref 23–29)
CREAT SERPL-MCNC: 1.4 MG/DL (ref 0.5–1.4)
DIFFERENTIAL METHOD: ABNORMAL
EOSINOPHIL # BLD AUTO: 0.4 K/UL (ref 0–0.5)
EOSINOPHIL NFR BLD: 3.2 % (ref 0–8)
ERYTHROCYTE [DISTWIDTH] IN BLOOD BY AUTOMATED COUNT: 15 % (ref 11.5–14.5)
EST. GFR  (AFRICAN AMERICAN): 50.9 ML/MIN/1.73 M^2
EST. GFR  (NON AFRICAN AMERICAN): 44.1 ML/MIN/1.73 M^2
GLUCOSE SERPL-MCNC: 99 MG/DL (ref 70–110)
HCT VFR BLD AUTO: 33.6 % (ref 37–48.5)
HGB BLD-MCNC: 10 G/DL (ref 12–16)
IMM GRANULOCYTES # BLD AUTO: 0.15 K/UL (ref 0–0.04)
IMM GRANULOCYTES NFR BLD AUTO: 1.1 % (ref 0–0.5)
INR PPP: 2 (ref 0.8–1.2)
LDH SERPL L TO P-CCNC: 269 U/L (ref 110–260)
LYMPHOCYTES # BLD AUTO: 1.2 K/UL (ref 1–4.8)
LYMPHOCYTES NFR BLD: 9.1 % (ref 18–48)
MAGNESIUM SERPL-MCNC: 2.9 MG/DL (ref 1.6–2.6)
MCH RBC QN AUTO: 27.6 PG (ref 27–31)
MCHC RBC AUTO-ENTMCNC: 29.8 G/DL (ref 32–36)
MCV RBC AUTO: 93 FL (ref 82–98)
MONOCYTES # BLD AUTO: 1.2 K/UL (ref 0.3–1)
MONOCYTES NFR BLD: 8.8 % (ref 4–15)
NEUTROPHILS # BLD AUTO: 10.4 K/UL (ref 1.8–7.7)
NEUTROPHILS NFR BLD: 77.4 % (ref 38–73)
NRBC BLD-RTO: 0 /100 WBC
PLATELET # BLD AUTO: 499 K/UL (ref 150–350)
PMV BLD AUTO: 9.5 FL (ref 9.2–12.9)
POTASSIUM SERPL-SCNC: 4.4 MMOL/L (ref 3.5–5.1)
PROT SERPL-MCNC: 6.4 G/DL (ref 6–8.4)
PROTHROMBIN TIME: 19.4 SEC (ref 9–12.5)
RBC # BLD AUTO: 3.62 M/UL (ref 4–5.4)
SODIUM SERPL-SCNC: 134 MMOL/L (ref 136–145)
WBC # BLD AUTO: 13.47 K/UL (ref 3.9–12.7)

## 2019-09-21 PROCEDURE — 99233 SBSQ HOSP IP/OBS HIGH 50: CPT | Mod: NTX,,, | Performed by: INTERNAL MEDICINE

## 2019-09-21 PROCEDURE — 25000003 PHARM REV CODE 250: Mod: NTX | Performed by: STUDENT IN AN ORGANIZED HEALTH CARE EDUCATION/TRAINING PROGRAM

## 2019-09-21 PROCEDURE — 80053 COMPREHEN METABOLIC PANEL: CPT | Mod: NTX

## 2019-09-21 PROCEDURE — 25000003 PHARM REV CODE 250: Mod: NTX | Performed by: NURSE PRACTITIONER

## 2019-09-21 PROCEDURE — 27000248 HC VAD-ADDITIONAL DAY: Mod: NTX

## 2019-09-21 PROCEDURE — 20600001 HC STEP DOWN PRIVATE ROOM: Mod: NTX

## 2019-09-21 PROCEDURE — 85610 PROTHROMBIN TIME: CPT | Mod: NTX

## 2019-09-21 PROCEDURE — 25000003 PHARM REV CODE 250: Mod: NTX | Performed by: THORACIC SURGERY (CARDIOTHORACIC VASCULAR SURGERY)

## 2019-09-21 PROCEDURE — 25000003 PHARM REV CODE 250: Mod: NTX | Performed by: HOSPITALIST

## 2019-09-21 PROCEDURE — 63600175 PHARM REV CODE 636 W HCPCS: Mod: NTX | Performed by: NURSE PRACTITIONER

## 2019-09-21 PROCEDURE — 83615 LACTATE (LD) (LDH) ENZYME: CPT | Mod: NTX

## 2019-09-21 PROCEDURE — 93750 INTERROGATION VAD IN PERSON: CPT | Mod: NTX,,, | Performed by: INTERNAL MEDICINE

## 2019-09-21 PROCEDURE — 85730 THROMBOPLASTIN TIME PARTIAL: CPT | Mod: NTX

## 2019-09-21 PROCEDURE — 36415 COLL VENOUS BLD VENIPUNCTURE: CPT | Mod: NTX

## 2019-09-21 PROCEDURE — 97535 SELF CARE MNGMENT TRAINING: CPT | Mod: NTX

## 2019-09-21 PROCEDURE — 25000003 PHARM REV CODE 250: Mod: NTX | Performed by: INTERNAL MEDICINE

## 2019-09-21 PROCEDURE — 85025 COMPLETE CBC W/AUTO DIFF WBC: CPT | Mod: NTX

## 2019-09-21 PROCEDURE — 93750 PR INTERROGATE VENT ASSIST DEV, IN PERSON, W PHYSICIAN ANALYSIS: ICD-10-PCS | Mod: NTX,,, | Performed by: INTERNAL MEDICINE

## 2019-09-21 PROCEDURE — 99233 PR SUBSEQUENT HOSPITAL CARE,LEVL III: ICD-10-PCS | Mod: NTX,,, | Performed by: INTERNAL MEDICINE

## 2019-09-21 PROCEDURE — 83735 ASSAY OF MAGNESIUM: CPT | Mod: NTX

## 2019-09-21 RX ORDER — WARFARIN 1 MG/1
2 TABLET ORAL DAILY
Status: DISCONTINUED | OUTPATIENT
Start: 2019-09-21 | End: 2019-09-22

## 2019-09-21 RX ADMIN — FUROSEMIDE 60 MG: 10 INJECTION, SOLUTION INTRAMUSCULAR; INTRAVENOUS at 05:09

## 2019-09-21 RX ADMIN — SIMETHICONE CHEW TAB 80 MG 80 MG: 80 TABLET ORAL at 08:09

## 2019-09-21 RX ADMIN — ASPIRIN 325 MG: 325 TABLET, COATED ORAL at 10:09

## 2019-09-21 RX ADMIN — OXYCODONE HYDROCHLORIDE 20 MG: 20 TABLET, FILM COATED, EXTENDED RELEASE ORAL at 10:09

## 2019-09-21 RX ADMIN — OXYCODONE HYDROCHLORIDE 20 MG: 20 TABLET, FILM COATED, EXTENDED RELEASE ORAL at 08:09

## 2019-09-21 RX ADMIN — SIMETHICONE CHEW TAB 80 MG 80 MG: 80 TABLET ORAL at 10:09

## 2019-09-21 RX ADMIN — SENNOSIDES AND DOCUSATE SODIUM 2 TABLET: 8.6; 5 TABLET ORAL at 08:09

## 2019-09-21 RX ADMIN — WARFARIN SODIUM 2 MG: 1 TABLET ORAL at 04:09

## 2019-09-21 RX ADMIN — PANTOPRAZOLE SODIUM 40 MG: 40 TABLET, DELAYED RELEASE ORAL at 10:09

## 2019-09-21 RX ADMIN — MIRTAZAPINE 15 MG: 15 TABLET, FILM COATED ORAL at 08:09

## 2019-09-21 RX ADMIN — VENLAFAXINE 150 MG: 37.5 TABLET ORAL at 09:09

## 2019-09-21 RX ADMIN — FUROSEMIDE 60 MG: 10 INJECTION, SOLUTION INTRAMUSCULAR; INTRAVENOUS at 10:09

## 2019-09-21 RX ADMIN — GABAPENTIN 400 MG: 100 CAPSULE ORAL at 10:09

## 2019-09-21 RX ADMIN — GABAPENTIN 400 MG: 100 CAPSULE ORAL at 08:09

## 2019-09-21 RX ADMIN — SIMETHICONE CHEW TAB 80 MG 80 MG: 80 TABLET ORAL at 06:09

## 2019-09-21 RX ADMIN — RAMELTEON 8 MG: 8 TABLET ORAL at 08:09

## 2019-09-21 RX ADMIN — SIMETHICONE CHEW TAB 80 MG 80 MG: 80 TABLET ORAL at 02:09

## 2019-09-21 RX ADMIN — POLYETHYLENE GLYCOL 3350 17 G: 17 POWDER, FOR SOLUTION ORAL at 09:09

## 2019-09-21 RX ADMIN — SENNOSIDES AND DOCUSATE SODIUM 2 TABLET: 8.6; 5 TABLET ORAL at 10:09

## 2019-09-21 NOTE — ASSESSMENT & PLAN NOTE
-HeartMate 3 Implanted 9/10/19 as DT.  -HTS Primary.  -Implanted by Dr. Walden  -INR  2.O . Goal INR 2.0-3.0.  Restart coumadin @2mg daily   -Antiplatelets  mg.  -LDH is stable overall today. Will continue to monitor daily.  -Speed set at 5200  -Not listed for OHTx.  - weaned off.   -Echo 9/18: LVIDD 5.69, TAPSE 0.86, AV does not open. The ventricular septum is at midline.   - JVP until jaw. On lasix 60 BID. Increase it to 60 TID   -If dizziness/lightheadeness continues, may need to consider RHC next week.   -Continue Pt/OT/VAD education    Procedure: Device Interrogation Including analysis of device parameters  Current Settings: Ventricular Assist Device  Review of device function is stable/unstable stable    TXP LVAD INTERROGATIONS 9/21/2019 9/21/2019 9/21/2019 9/21/2019 9/20/2019 9/20/2019 9/20/2019   Type HeartMate3 HeartMate3 HeartMate3 HeartMate3 HeartMate3 HeartMate3 HeartMate3   Flow 3.7 3.6 3.7 3.5 3.6 3.7 3.5   Speed 5200 5200 5200 5200 5200 5200 5200   PI 4.2 5.1 3.2 3.6 3.8 3.3 4.1   Power (Orellana) 3.5 3.6 3.2 3.3 3.5 3.4 3.6   LSL 4800 4800 4800 4800 4800 - -   Pulsatility No Pulse No Pulse Intermittent pulse - Intermittent pulse - -

## 2019-09-21 NOTE — PROGRESS NOTES
[facility]  Heart Transplant  Progress Note    Patient Name: Deborah Navas  MRN: [id number]  Admission Date: 9/4/2019  Hospital Length of Stay: 17 days  Attending Physician: Jolene Lua MD  Primary Care Provider: Primary Doctor No  Principal Problem:LVAD (left ventricular assist device) present    Subjective:     Interval History: No events overnight. No lightheadedness  Reported.     Continuous Infusions:  Scheduled Meds:   aspirin  325 mg Oral Daily    furosemide  60 mg Intravenous BID    gabapentin  400 mg Oral BID    mirtazapine  15 mg Oral QHS    oxyCODONE  20 mg Oral Q12H    pantoprazole  40 mg Oral Daily    polyethylene glycol  17 g Oral Daily    ramelteon  8 mg Oral QHS    senna-docusate 8.6-50 mg  2 tablet Oral BID    simethicone  1 tablet Oral QID (PC + HS)    venlafaxine  150 mg Oral Daily     PRN Meds:albuterol sulfate, bisacodyl, glycerin 99.5% **AND** magnesium citrate **AND** sodium chloride 0.9%, hydrocortisone, hydrOXYzine HCl, magnesium hydroxide 400 mg/5 ml, magnesium sulfate IVPB, ondansetron, oxyCODONE, oxyCODONE-acetaminophen, promethazine (PHENERGAN) IVPB, simethicone, sodium chloride 0.9%    Review of patient's allergies indicates:   Allergen Reactions    Adhesive Blisters     Reaction to area in chest and up only    Codeine Itching     Objective:     Vital Signs (Most Recent):  Temp: 97.9 °F (36.6 °C) (09/21/19 0933)  Pulse: 60 (09/21/19 1210)  Resp: 18 (09/21/19 1202)  BP: (!) 88/0 (09/21/19 1202)  SpO2: 96 % (09/21/19 1202) Vital Signs (24h Range):  Temp:  [97.5 °F (36.4 °C)-98.4 °F (36.9 °C)] 97.9 °F (36.6 °C)  Pulse:  [47-80] 60  Resp:  [16-18] 18  SpO2:  [90 %-97 %] 96 %  BP: ()/(0-57) 88/0     Patient Vitals for the past 72 hrs (Last 3 readings):   Weight   09/21/19 0500 106.6 kg (235 lb 0.2 oz)   09/20/19 0837 106.1 kg (233 lb 14.5 oz)     Body mass index is 36.81 kg/m².      Intake/Output Summary (Last 24 hours) at 9/21/2019  1403  Last data filed at 9/21/2019 0500  Gross per 24 hour   Intake 480 ml   Output 250 ml   Net 230 ml       Hemodynamic Parameters:       Telemetry: VAD artifact      Physical Exam  Constitutional: She is oriented to person, place, and time. She appears well-developed and well-nourished.   HENT:   Head: Normocephalic.   Eyes: Pupils are equal, round, and reactive to light.   Neck: Normal range of motion. Neck supple.   Cardiovascular: Normal rate and regular rhythm.   VAD hum   Pulmonary/Chest: Effort normal and breath sounds normal.   Abdominal: Soft. Bowel sounds are normal.   Musculoskeletal: Normal range of motion.   Neurological: She is alert and oriented to person, place, and time.   Skin: Skin is warm and dry.   Psychiatric: She has a normal mood and affect. Her behavior is normal.      Significant Labs:  CBC:  Recent Labs   Lab 09/19/19 0416 09/20/19 0256 09/21/19  0322   WBC 9.58 12.44 13.47*   RBC 3.68* 3.84* 3.62*   HGB 10.3* 10.8* 10.0*   HCT 34.4* 35.0* 33.6*   * 472* 499*   MCV 94 91 93   MCH 28.0 28.1 27.6   MCHC 29.9* 30.9* 29.8*     BNP:  Recent Labs   Lab 09/16/19  0243   *     CMP:  Recent Labs   Lab 09/19/19 0418 09/20/19  0256 09/21/19  0322    134* 99   CALCIUM 9.8 9.4 9.3   ALBUMIN 2.8* 2.9* 2.7*   PROT 6.5 6.8 6.4   * 136 134*   K 4.6 4.3 4.4   CO2 29 31* 35*   CL 93* 91* 89*   BUN 26* 29* 32*   CREATININE 1.1 1.2 1.4   ALKPHOS 66 76 74   ALT 35 30 22   AST 15 17 17   BILITOT 0.6 0.6 0.5      Coagulation:   Recent Labs   Lab 09/19/19 0416 09/20/19  0256 09/21/19  0322   INR 3.7* 3.6* 2.0*   APTT 47.0* 43.1* 44.5*     LDH:  Recent Labs   Lab 09/19/19 0416 09/20/19  0256 09/21/19  0322    261* 269*     Microbiology:  Microbiology Results (last 7 days)     Procedure Component Value Units Date/Time    HPV High Risk Genotypes, PCR [[id number]] Collected:  09/11/19 1330    Order Status:  Completed Updated:  09/16/19 1259     HPV High Risk type 16, PCR  Negative     HPV High Risk type 18, PCR Negative     HPV other High Risk types, PCR Negative     Comment: Other HPV genotypes include:   31,33,35,39,45,51,52,56,58,59,66 and 68.         Narrative:       replaces order [id number]    Blood culture [[id number]] Collected:  09/11/19 0810    Order Status:  Completed Specimen:  Blood from Peripheral, Antecubital, Left Updated:  09/16/19 1012     Blood Culture, Routine No growth after 5 days.    Blood culture [[id number]] Collected:  09/11/19 0832    Order Status:  Completed Specimen:  Blood from Line, Jugular, Internal Right Updated:  09/16/19 1012     Blood Culture, Routine No growth after 5 days.          I have reviewed all pertinent labs within the past 24 hours.    Estimated Creatinine Clearance: 61.1 mL/min (based on SCr of 1.4 mg/dL).    Diagnostic Results:  I have reviewed all pertinent imaging results/findings within the past 24 hours.    Assessment and Plan:     No notes on file    * LVAD (left ventricular assist device) present  -HeartMate 3 Implanted 9/10/19 as DT.  -HTS Primary.  -Implanted by Dr. Walden  -INR  2.O . Goal INR 2.0-3.0.  Restart coumadin @2mg daily   -Antiplatelets  mg.  -LDH is stable overall today. Will continue to monitor daily.  -Speed set at 5200  -Not listed for OHTx.  - weaned off.   -Echo 9/18: LVIDD 5.69, TAPSE 0.86, AV does not open. The ventricular septum is at midline.   - JVP until jaw. On lasix 60 BID. Increase it to 60 TID   -If dizziness/lightheadeness continues, may need to consider RHC next week.   -Continue Pt/OT/VAD education    Procedure: Device Interrogation Including analysis of device parameters  Current Settings: Ventricular Assist Device  Review of device function is stable/unstable stable    TXP LVAD INTERROGATIONS 9/21/2019 9/21/2019 9/21/2019 9/21/2019 9/20/2019 9/20/2019 9/20/2019   Type HeartMate3 HeartMate3 HeartMate3 HeartMate3 HeartMate3 HeartMate3 HeartMate3   Flow 3.7 3.6 3.7 3.5 3.6 3.7 3.5   Speed  5200 5200 5200 5200 5200 5200 5200   PI 4.2 5.1 3.2 3.6 3.8 3.3 4.1   Power (Orellana) 3.5 3.6 3.2 3.3 3.5 3.4 3.6   LSL 4800 4800 4800 4800 4800 - -   Pulsatility No Pulse No Pulse Intermittent pulse - Intermittent pulse - -       Atrial flutter  -Continue Amio/anticoagulation.     Constipation  -Continue Senna/Colace/miralax.  -Add prn milk of Mag today.     Pleural effusion  -Bilateral pleural effusions present, L>R.    CKD (chronic kidney disease)  -Creatinine at baseline  - Avoid nephrotoxic agents    Abnormal CT scan  -focal opacity found at right base.  DDx: atelectasis.  Recommending repeat CT in 3 months    Enlarged thyroid  Ultrasound done and showed enlarged multinodular thyroid which warrants surveillance in one year.   - TSH and fT4 wnl    History of ventricular tachycardia  -In the setting of hypokalemia  -Monitor electrolytes closely    Acute on chronic combined systolic and diastolic heart failure  NICM EF 20% s/p LVAD 9/10  RHC: done on admit 9/4/19 RA: 20/ 20/ 18 RV: 60/ 8/ 18 PA: 60/ 32/ 45 PWP: 43/ 70/ 40 . Cardiac output was 2.27 by Fitz. Cardiac index is 1.04 L/min/m2. O2 Sat: PA 34%. AO 95% PVR 2.2 PALMER    -see s/p LVAD      Ventricular tachyarrhythmia  - On amio at home, hold for now (see above)   - Goal K >4 and Mg >2  - Monitor on telemetry    ICD (implantable cardioverter-defibrillator) in place  -Medtronic CRT-D  -Patient began v-pacing at 60 and 9/14/19 began having phrenic nerve stimulation. Amiodarone held and EP consulted, device settings changed and PNS ceased.   -Underlying rhythm a flutter with slow ventricular response at 32 bpm        Dandre Hunter MD  Heart Transplant  [facility]

## 2019-09-21 NOTE — NURSING
Caregiver completed LVAD dressing change, needed a few reminders about the order of the steps and several reminders to maintain sterile field. Doreen sterile gloves successfully the first time. Should be checked off by Monday with consistent teaching.    DLES a 2, slight swelling and redness as expected from post-op, sutures still in place, no S/SX of infection.

## 2019-09-21 NOTE — PT/OT/SLP PROGRESS
Occupational Therapy   Treatment    Name: Deborah Navas  MRN: [id number]  Admitting Diagnosis:  LVAD (left ventricular assist device) present  11 Days Post-Op    Recommendations:     Discharge Recommendations: home with home health  Discharge Equipment Recommendations:  none  Barriers to discharge:  None    Assessment:     Deborah Navas is a 48 y.o. female with a medical diagnosis of LVAD (left ventricular assist device) present.  She presents with increased dizziness associated with drop in PI and Flow, thus limited activity tolerance. Performance deficits affecting function are impaired self care skills, impaired balance, weakness, impaired cardiopulmonary response to activity, impaired endurance, impaired functional mobilty, gait instability, decreased upper extremity function, decreased safety awareness.     Rehab Prognosis:  Good; patient would benefit from acute skilled OT services to address these deficits and reach maximum level of function.       Plan:     Patient to be seen 6 x/week to address the above listed problems via self-care/home management, therapeutic activities, therapeutic exercises  · Plan of Care Expires: 10/11/19  · Plan of Care Reviewed with: patient, friend    Subjective     Pain/Comfort:  · Pain Rating 1: 0/10  · Pain Rating Post-Intervention 1: 0/10    Objective:     Communicated with: RN prior to session.  Patient found standing at bedside with PCT with telemetry, LVAD upon OT entry to room.    General Precautions: Standard, LVAD, fall, sternal   Orthopedic Precautions:N/A   Braces:       Occupational Performance:     Bed Mobility:    · NT     Functional Mobility/Transfers:  · Patient completed Sit <> Stand Transfer with contact guard assistance  with  no assistive device from rolling chair in hallway and b/s chair in room  · Functional Mobility: Minimal A with unilateral HHA regressing to B HHA 2* increased dizziness and LOB    Activities of Daily  Living:  · Grooming: stand by assistance seated at sink      [facility] 6 Click ADL: 15    Treatment & Education:  Pt ed on OT POC  Pt found standing at b/s with PCT in prep for ambulation  OT assisted with pt ambulating to sink, however pt declined self-care until after walk  Pt with increased dizziness and LOB necessitating B HHA; at doorway, pt requesting to sit, thus rolling chair provided; RN present  Pt recovered and ambulated back to sink with reports of increased dizziness and needing to sit on bedside commode; OT checked numbers on controller with PI at 1.8 and Flow at 3.2 (numbers compared to morning numbers in binder with PI in am at 4.6 and Flow 3.6)  Pt returned to bedside chair and RN notified of drop in PI and Flow as well as increased dizziness and LOB    Patient left up in chair with all lines intact, call button in reach, RN notified and caregiver presentEducation:      GOALS:   Multidisciplinary Problems     Occupational Therapy Goals        Problem: Occupational Therapy Goal    Goal Priority Disciplines Outcome Interventions   Occupational Therapy Goal     OT, PT/OT Ongoing (interventions implemented as appropriate)    Description:  Goals to be met by: 10/11/19     Patient will increase functional independence with ADLs by performing:    UE Dressing with [place].  LE Dressing with [place].  Grooming while standing at sink with [place].  Toileting from toilet with [place] for hygiene and clothing management.   Bathing from  shower chair/bench with [place].  Toilet transfer to toilet with [place].  Increased functional strength to WFL for B UE.  Upper extremity exercise program x15 reps per handout, with independence.                       Time Tracking:     OT Date of Treatment: 09/21/19  OT Start Time: 1400  OT Stop Time: 1419  OT Total Time (min): 19 min    Billable Minutes:Therapeutic Activity 19    JAGJIT Byrne  9/21/2019

## 2019-09-21 NOTE — PLAN OF CARE
Problem: Adult Inpatient Plan of Care  Goal: Plan of Care Review  Outcome: Ongoing (interventions implemented as appropriate)  Pt AAO and VSS. Pt family/caregiver did dressing change 9/20 and needs to be checked off. Pt LVAD with smooth hum and numbers WNL. Pt BM today and voided post parrish removal. Pt educated on fall risk overnight,pt remained free from falls/trauma/injury. Denies chest pain, SOB, palpitations, dizziness, pain, or discomfort. Plan of care reviewed with pt, all questions answered. Bed locked in lowest position, call bell within reach, no acute distress noted, will continue to monitor.

## 2019-09-21 NOTE — PLAN OF CARE
Problem: Occupational Therapy Goal  Goal: Occupational Therapy Goal  Goals to be met by: 10/11/19     Patient will increase functional independence with ADLs by performing:    UE Dressing with [place].  LE Dressing with [place].  Grooming while standing at sink with [place].  Toileting from toilet with [place] for hygiene and clothing management.   Bathing from  shower chair/bench with [place].  Toilet transfer to toilet with [place].  Increased functional strength to WFL for B UE.  Upper extremity exercise program x15 reps per handout, with independence.      Outcome: Ongoing (interventions implemented as appropriate)  The above goals remain appropriate. JAGJIT Byrne  9/21/2019

## 2019-09-21 NOTE — PROGRESS NOTES
MD Watt notified of Pt sternal post vad wound vac having turned off @ 1100 on 9/20 according to Pt. Instructed to leave in place and wait for day team rounds and to call CTS back with any questions then.

## 2019-09-21 NOTE — SUBJECTIVE & OBJECTIVE
Interval History: No events overnight. No lightheadedness  Reported.     Continuous Infusions:  Scheduled Meds:   aspirin  325 mg Oral Daily    furosemide  60 mg Intravenous BID    gabapentin  400 mg Oral BID    mirtazapine  15 mg Oral QHS    oxyCODONE  20 mg Oral Q12H    pantoprazole  40 mg Oral Daily    polyethylene glycol  17 g Oral Daily    ramelteon  8 mg Oral QHS    senna-docusate 8.6-50 mg  2 tablet Oral BID    simethicone  1 tablet Oral QID (PC + HS)    venlafaxine  150 mg Oral Daily     PRN Meds:albuterol sulfate, bisacodyl, glycerin 99.5% **AND** magnesium citrate **AND** sodium chloride 0.9%, hydrocortisone, hydrOXYzine HCl, magnesium hydroxide 400 mg/5 ml, magnesium sulfate IVPB, ondansetron, oxyCODONE, oxyCODONE-acetaminophen, promethazine (PHENERGAN) IVPB, simethicone, sodium chloride 0.9%    Review of patient's allergies indicates:   Allergen Reactions    Adhesive Blisters     Reaction to area in chest and up only    Codeine Itching     Objective:     Vital Signs (Most Recent):  Temp: 97.9 °F (36.6 °C) (09/21/19 0933)  Pulse: 60 (09/21/19 1210)  Resp: 18 (09/21/19 1202)  BP: (!) 88/0 (09/21/19 1202)  SpO2: 96 % (09/21/19 1202) Vital Signs (24h Range):  Temp:  [97.5 °F (36.4 °C)-98.4 °F (36.9 °C)] 97.9 °F (36.6 °C)  Pulse:  [47-80] 60  Resp:  [16-18] 18  SpO2:  [90 %-97 %] 96 %  BP: ()/(0-57) 88/0     Patient Vitals for the past 72 hrs (Last 3 readings):   Weight   09/21/19 0500 106.6 kg (235 lb 0.2 oz)   09/20/19 0837 106.1 kg (233 lb 14.5 oz)     Body mass index is 36.81 kg/m².      Intake/Output Summary (Last 24 hours) at 9/21/2019 1403  Last data filed at 9/21/2019 0500  Gross per 24 hour   Intake 480 ml   Output 250 ml   Net 230 ml       Hemodynamic Parameters:       Telemetry: VAD artifact      Physical Exam  Constitutional: She is oriented to person, place, and time. She appears well-developed and well-nourished.   HENT:   Head: Normocephalic.   Eyes: Pupils are equal, round,  and reactive to light.   Neck: Normal range of motion. Neck supple.   Cardiovascular: Normal rate and regular rhythm.   VAD hum   Pulmonary/Chest: Effort normal and breath sounds normal.   Abdominal: Soft. Bowel sounds are normal.   Musculoskeletal: Normal range of motion.   Neurological: She is alert and oriented to person, place, and time.   Skin: Skin is warm and dry.   Psychiatric: She has a normal mood and affect. Her behavior is normal.      Significant Labs:  CBC:  Recent Labs   Lab 09/19/19 0416 09/20/19 0256 09/21/19  0322   WBC 9.58 12.44 13.47*   RBC 3.68* 3.84* 3.62*   HGB 10.3* 10.8* 10.0*   HCT 34.4* 35.0* 33.6*   * 472* 499*   MCV 94 91 93   MCH 28.0 28.1 27.6   MCHC 29.9* 30.9* 29.8*     BNP:  Recent Labs   Lab 09/16/19  0243   *     CMP:  Recent Labs   Lab 09/19/19 0418 09/20/19 0256 09/21/19  0322    134* 99   CALCIUM 9.8 9.4 9.3   ALBUMIN 2.8* 2.9* 2.7*   PROT 6.5 6.8 6.4   * 136 134*   K 4.6 4.3 4.4   CO2 29 31* 35*   CL 93* 91* 89*   BUN 26* 29* 32*   CREATININE 1.1 1.2 1.4   ALKPHOS 66 76 74   ALT 35 30 22   AST 15 17 17   BILITOT 0.6 0.6 0.5      Coagulation:   Recent Labs   Lab 09/19/19 0416 09/20/19  0256 09/21/19  0322   INR 3.7* 3.6* 2.0*   APTT 47.0* 43.1* 44.5*     LDH:  Recent Labs   Lab 09/19/19 0416 09/20/19  0256 09/21/19  0322    261* 269*     Microbiology:  Microbiology Results (last 7 days)     Procedure Component Value Units Date/Time    HPV High Risk Genotypes, PCR [[id number]] Collected:  09/11/19 1330    Order Status:  Completed Updated:  09/16/19 1259     HPV High Risk type 16, PCR Negative     HPV High Risk type 18, PCR Negative     HPV other High Risk types, PCR Negative     Comment: Other HPV genotypes include:   31,33,35,39,45,51,52,56,58,59,66 and 68.         Narrative:       replaces order [id number]    Blood culture [[id number]] Collected:  09/11/19 0810    Order Status:  Completed Specimen:  Blood from Peripheral, Antecubital, Left  Updated:  09/16/19 1012     Blood Culture, Routine No growth after 5 days.    Blood culture [[id number]] Collected:  09/11/19 0832    Order Status:  Completed Specimen:  Blood from Line, Jugular, Internal Right Updated:  09/16/19 1012     Blood Culture, Routine No growth after 5 days.          I have reviewed all pertinent labs within the past 24 hours.    Estimated Creatinine Clearance: 61.1 mL/min (based on SCr of 1.4 mg/dL).    Diagnostic Results:  I have reviewed all pertinent imaging results/findings within the past 24 hours.

## 2019-09-21 NOTE — PLAN OF CARE
Problem: Adult Inpatient Plan of Care  Goal: Plan of Care Review  Outcome: Ongoing (interventions implemented as appropriate)  HM3 with H/H of 10/33.6 and INR of 2.0. NC removed, pt on RA sating 96+. Caregiver to complete LVAD dressing change today, note to follow. Paced/a-flutter rhythm on tele, pt asymptomatic. No complaints of pain or SOB, no falls. CTS contacted about wound vac, still awaiting removal. Pt up in chair and ambulating with SB assist to bedside commode, all fall precautions in place. VSS.

## 2019-09-22 LAB
ALBUMIN SERPL BCP-MCNC: 2.5 G/DL (ref 3.5–5.2)
ALP SERPL-CCNC: 80 U/L (ref 55–135)
ALT SERPL W/O P-5'-P-CCNC: 21 U/L (ref 10–44)
ANION GAP SERPL CALC-SCNC: 13 MMOL/L (ref 8–16)
APTT BLDCRRT: 29.5 SEC (ref 21–32)
APTT BLDCRRT: 33.8 SEC (ref 21–32)
APTT BLDCRRT: 40.8 SEC (ref 21–32)
AST SERPL-CCNC: 29 U/L (ref 10–40)
BASOPHILS # BLD AUTO: 0.06 K/UL (ref 0–0.2)
BASOPHILS # BLD AUTO: 0.06 K/UL (ref 0–0.2)
BASOPHILS NFR BLD: 0.4 % (ref 0–1.9)
BASOPHILS NFR BLD: 0.5 % (ref 0–1.9)
BILIRUB SERPL-MCNC: 0.5 MG/DL (ref 0.1–1)
BUN SERPL-MCNC: 32 MG/DL (ref 6–20)
CALCIUM SERPL-MCNC: 9.2 MG/DL (ref 8.7–10.5)
CHLORIDE SERPL-SCNC: 88 MMOL/L (ref 95–110)
CO2 SERPL-SCNC: 29 MMOL/L (ref 23–29)
CREAT SERPL-MCNC: 1.3 MG/DL (ref 0.5–1.4)
DIFFERENTIAL METHOD: ABNORMAL
DIFFERENTIAL METHOD: ABNORMAL
EOSINOPHIL # BLD AUTO: 0.3 K/UL (ref 0–0.5)
EOSINOPHIL # BLD AUTO: 0.4 K/UL (ref 0–0.5)
EOSINOPHIL NFR BLD: 2.4 % (ref 0–8)
EOSINOPHIL NFR BLD: 3 % (ref 0–8)
ERYTHROCYTE [DISTWIDTH] IN BLOOD BY AUTOMATED COUNT: 15 % (ref 11.5–14.5)
ERYTHROCYTE [DISTWIDTH] IN BLOOD BY AUTOMATED COUNT: 15.2 % (ref 11.5–14.5)
EST. GFR  (AFRICAN AMERICAN): 55.7 ML/MIN/1.73 M^2
EST. GFR  (NON AFRICAN AMERICAN): 48.3 ML/MIN/1.73 M^2
GLUCOSE SERPL-MCNC: 113 MG/DL (ref 70–110)
HCT VFR BLD AUTO: 32.7 % (ref 37–48.5)
HCT VFR BLD AUTO: 33.5 % (ref 37–48.5)
HGB BLD-MCNC: 10.1 G/DL (ref 12–16)
HGB BLD-MCNC: 9.9 G/DL (ref 12–16)
IMM GRANULOCYTES # BLD AUTO: 0.16 K/UL (ref 0–0.04)
IMM GRANULOCYTES # BLD AUTO: 0.21 K/UL (ref 0–0.04)
IMM GRANULOCYTES NFR BLD AUTO: 1.3 % (ref 0–0.5)
IMM GRANULOCYTES NFR BLD AUTO: 1.6 % (ref 0–0.5)
INR PPP: 1.7 (ref 0.8–1.2)
INR PPP: 1.7 (ref 0.8–1.2)
LDH SERPL L TO P-CCNC: 432 U/L (ref 110–260)
LYMPHOCYTES # BLD AUTO: 1 K/UL (ref 1–4.8)
LYMPHOCYTES # BLD AUTO: 1.2 K/UL (ref 1–4.8)
LYMPHOCYTES NFR BLD: 10.1 % (ref 18–48)
LYMPHOCYTES NFR BLD: 7.1 % (ref 18–48)
MAGNESIUM SERPL-MCNC: 2.7 MG/DL (ref 1.6–2.6)
MCH RBC QN AUTO: 27.6 PG (ref 27–31)
MCH RBC QN AUTO: 27.7 PG (ref 27–31)
MCHC RBC AUTO-ENTMCNC: 30.1 G/DL (ref 32–36)
MCHC RBC AUTO-ENTMCNC: 30.3 G/DL (ref 32–36)
MCV RBC AUTO: 91 FL (ref 82–98)
MCV RBC AUTO: 92 FL (ref 82–98)
MONOCYTES # BLD AUTO: 1 K/UL (ref 0.3–1)
MONOCYTES # BLD AUTO: 1.1 K/UL (ref 0.3–1)
MONOCYTES NFR BLD: 7.6 % (ref 4–15)
MONOCYTES NFR BLD: 8.9 % (ref 4–15)
NEUTROPHILS # BLD AUTO: 10.9 K/UL (ref 1.8–7.7)
NEUTROPHILS # BLD AUTO: 9.4 K/UL (ref 1.8–7.7)
NEUTROPHILS NFR BLD: 76.2 % (ref 38–73)
NEUTROPHILS NFR BLD: 80.9 % (ref 38–73)
NRBC BLD-RTO: 0 /100 WBC
NRBC BLD-RTO: 0 /100 WBC
PLATELET # BLD AUTO: 421 K/UL (ref 150–350)
PLATELET # BLD AUTO: 525 K/UL (ref 150–350)
PMV BLD AUTO: 9.6 FL (ref 9.2–12.9)
PMV BLD AUTO: 9.9 FL (ref 9.2–12.9)
POTASSIUM SERPL-SCNC: 4.6 MMOL/L (ref 3.5–5.1)
PROT SERPL-MCNC: 6.6 G/DL (ref 6–8.4)
PROTHROMBIN TIME: 16.3 SEC (ref 9–12.5)
PROTHROMBIN TIME: 16.8 SEC (ref 9–12.5)
RBC # BLD AUTO: 3.58 M/UL (ref 4–5.4)
RBC # BLD AUTO: 3.66 M/UL (ref 4–5.4)
SODIUM SERPL-SCNC: 130 MMOL/L (ref 136–145)
WBC # BLD AUTO: 12.29 K/UL (ref 3.9–12.7)
WBC # BLD AUTO: 13.43 K/UL (ref 3.9–12.7)

## 2019-09-22 PROCEDURE — 99233 PR SUBSEQUENT HOSPITAL CARE,LEVL III: ICD-10-PCS | Mod: NTX,,, | Performed by: INTERNAL MEDICINE

## 2019-09-22 PROCEDURE — 83615 LACTATE (LD) (LDH) ENZYME: CPT | Mod: NTX

## 2019-09-22 PROCEDURE — 80053 COMPREHEN METABOLIC PANEL: CPT | Mod: NTX

## 2019-09-22 PROCEDURE — 93750 INTERROGATION VAD IN PERSON: CPT | Mod: NTX,,, | Performed by: INTERNAL MEDICINE

## 2019-09-22 PROCEDURE — 25000003 PHARM REV CODE 250: Mod: NTX | Performed by: STUDENT IN AN ORGANIZED HEALTH CARE EDUCATION/TRAINING PROGRAM

## 2019-09-22 PROCEDURE — 83735 ASSAY OF MAGNESIUM: CPT | Mod: NTX

## 2019-09-22 PROCEDURE — 85025 COMPLETE CBC W/AUTO DIFF WBC: CPT | Mod: NTX

## 2019-09-22 PROCEDURE — 63600175 PHARM REV CODE 636 W HCPCS: Mod: NTX | Performed by: HOSPITALIST

## 2019-09-22 PROCEDURE — 85730 THROMBOPLASTIN TIME PARTIAL: CPT | Mod: 91,NTX

## 2019-09-22 PROCEDURE — 97116 GAIT TRAINING THERAPY: CPT | Mod: NTX

## 2019-09-22 PROCEDURE — 99233 SBSQ HOSP IP/OBS HIGH 50: CPT | Mod: NTX,,, | Performed by: INTERNAL MEDICINE

## 2019-09-22 PROCEDURE — 25000003 PHARM REV CODE 250: Mod: NTX | Performed by: INTERNAL MEDICINE

## 2019-09-22 PROCEDURE — 25000003 PHARM REV CODE 250: Mod: NTX | Performed by: NURSE PRACTITIONER

## 2019-09-22 PROCEDURE — 85610 PROTHROMBIN TIME: CPT | Mod: 91,NTX

## 2019-09-22 PROCEDURE — 25000003 PHARM REV CODE 250: Mod: NTX | Performed by: THORACIC SURGERY (CARDIOTHORACIC VASCULAR SURGERY)

## 2019-09-22 PROCEDURE — 85610 PROTHROMBIN TIME: CPT | Mod: NTX

## 2019-09-22 PROCEDURE — 93750 PR INTERROGATE VENT ASSIST DEV, IN PERSON, W PHYSICIAN ANALYSIS: ICD-10-PCS | Mod: NTX,,, | Performed by: INTERNAL MEDICINE

## 2019-09-22 PROCEDURE — 20600001 HC STEP DOWN PRIVATE ROOM: Mod: NTX

## 2019-09-22 PROCEDURE — 97530 THERAPEUTIC ACTIVITIES: CPT | Mod: NTX

## 2019-09-22 PROCEDURE — 36415 COLL VENOUS BLD VENIPUNCTURE: CPT | Mod: NTX

## 2019-09-22 PROCEDURE — 63600175 PHARM REV CODE 636 W HCPCS: Mod: NTX | Performed by: INTERNAL MEDICINE

## 2019-09-22 PROCEDURE — 85730 THROMBOPLASTIN TIME PARTIAL: CPT | Mod: NTX

## 2019-09-22 PROCEDURE — 25000003 PHARM REV CODE 250: Mod: NTX | Performed by: HOSPITALIST

## 2019-09-22 PROCEDURE — 63600175 PHARM REV CODE 636 W HCPCS: Mod: NTX | Performed by: NURSE PRACTITIONER

## 2019-09-22 PROCEDURE — 27000248 HC VAD-ADDITIONAL DAY: Mod: NTX

## 2019-09-22 RX ORDER — WARFARIN SODIUM 5 MG/1
5 TABLET ORAL DAILY
Status: DISCONTINUED | OUTPATIENT
Start: 2019-09-22 | End: 2019-09-24

## 2019-09-22 RX ORDER — HEPARIN SODIUM,PORCINE/D5W 25000/250
12 INTRAVENOUS SOLUTION INTRAVENOUS CONTINUOUS
Status: DISCONTINUED | OUTPATIENT
Start: 2019-09-22 | End: 2019-09-24

## 2019-09-22 RX ADMIN — POLYETHYLENE GLYCOL 3350 17 G: 17 POWDER, FOR SOLUTION ORAL at 09:09

## 2019-09-22 RX ADMIN — WARFARIN SODIUM 5 MG: 5 TABLET ORAL at 04:09

## 2019-09-22 RX ADMIN — MAGNESIUM HYDROXIDE 2400 MG: 400 SUSPENSION ORAL at 08:09

## 2019-09-22 RX ADMIN — GABAPENTIN 400 MG: 100 CAPSULE ORAL at 09:09

## 2019-09-22 RX ADMIN — FUROSEMIDE 60 MG: 10 INJECTION, SOLUTION INTRAMUSCULAR; INTRAVENOUS at 05:09

## 2019-09-22 RX ADMIN — HEPARIN SODIUM 12 UNITS/KG/HR: 10000 INJECTION, SOLUTION INTRAVENOUS at 10:09

## 2019-09-22 RX ADMIN — SENNOSIDES AND DOCUSATE SODIUM 2 TABLET: 8.6; 5 TABLET ORAL at 09:09

## 2019-09-22 RX ADMIN — OXYCODONE HYDROCHLORIDE 20 MG: 20 TABLET, FILM COATED, EXTENDED RELEASE ORAL at 09:09

## 2019-09-22 RX ADMIN — ONDANSETRON 4 MG: 2 INJECTION INTRAMUSCULAR; INTRAVENOUS at 09:09

## 2019-09-22 RX ADMIN — MIRTAZAPINE 15 MG: 15 TABLET, FILM COATED ORAL at 08:09

## 2019-09-22 RX ADMIN — ASPIRIN 325 MG: 325 TABLET, COATED ORAL at 09:09

## 2019-09-22 RX ADMIN — SIMETHICONE CHEW TAB 80 MG 80 MG: 80 TABLET ORAL at 06:09

## 2019-09-22 RX ADMIN — OXYCODONE HYDROCHLORIDE 20 MG: 20 TABLET, FILM COATED, EXTENDED RELEASE ORAL at 08:09

## 2019-09-22 RX ADMIN — GABAPENTIN 400 MG: 100 CAPSULE ORAL at 08:09

## 2019-09-22 RX ADMIN — PANTOPRAZOLE SODIUM 40 MG: 40 TABLET, DELAYED RELEASE ORAL at 09:09

## 2019-09-22 RX ADMIN — SENNOSIDES AND DOCUSATE SODIUM 2 TABLET: 8.6; 5 TABLET ORAL at 08:09

## 2019-09-22 RX ADMIN — RAMELTEON 8 MG: 8 TABLET ORAL at 08:09

## 2019-09-22 RX ADMIN — SIMETHICONE CHEW TAB 80 MG 80 MG: 80 TABLET ORAL at 02:09

## 2019-09-22 RX ADMIN — FUROSEMIDE 60 MG: 10 INJECTION, SOLUTION INTRAMUSCULAR; INTRAVENOUS at 09:09

## 2019-09-22 RX ADMIN — VENLAFAXINE 150 MG: 37.5 TABLET ORAL at 09:09

## 2019-09-22 RX ADMIN — SIMETHICONE CHEW TAB 80 MG 80 MG: 80 TABLET ORAL at 08:09

## 2019-09-22 RX ADMIN — SIMETHICONE CHEW TAB 80 MG 80 MG: 80 TABLET ORAL at 09:09

## 2019-09-22 NOTE — PLAN OF CARE
Goals reviewed and remain appropriate. Pt progressing towards goals.    Alecia Pace, PT, DPT   9/22/2019  [phone]

## 2019-09-22 NOTE — PLAN OF CARE
Problem: Adult Inpatient Plan of Care  Goal: Plan of Care Review  Outcome: Ongoing (interventions implemented as appropriate)  Pt AAO and VSS. Pt family/caregiver did dressing change 9/21 and needs to be checked off. Pt LVAD with smooth hum and numbers WNL. Pt BM today and voiding well. Pt satting well on RA. Pt educated on fall risk overnight,pt remained free from falls/trauma/injury. Denies chest pain, SOB, palpitations, dizziness, pain, or discomfort. Plan of care reviewed with pt, all questions answered. Bed locked in lowest position, call bell within reach, no acute distress noted, will continue to monitor.

## 2019-09-22 NOTE — PLAN OF CARE
Heparin GTT started due to INR of 1.7 this a.m., pt concerned about not having frequent BM's, last BM 9-20 and pt has not been eating frequently or ambulating much, RN reassured pt that her bowels will normalize eventually, pt asymptomatic. Family member at bedside to complete LVAD dressing change today, note to follow. No complaints of pain or SOB, no falls, pt up in chair all day, VSS. Still not signed off on alarms of dressing change.

## 2019-09-22 NOTE — PROGRESS NOTES
09/21/2019  Abhijit Perez    Current provider:  Jolene Lua MD      I, Abhijit Perez, rounded on Deborah Navas to ensure all mechanical assist device settings (IABP or VAD) were appropriate and all parameters were within limits.  I was able to ensure all back up equipment was present, the staff had no issues, and the Perfusion Department daily rounding was complete.    11:09 PM

## 2019-09-22 NOTE — PT/OT/SLP PROGRESS
Physical Therapy Treatment    Patient Name:  Deborah Navas   MRN:  [id number]    Recommendations:     Discharge Recommendations:  home health PT   Discharge Equipment Recommendations: none   Barriers to discharge: Decreased caregiver support at current functional level    Assessment:     Deborah Navas is a 48 y.o. female admitted with a medical diagnosis of LVAD (left ventricular assist device) present.  She presents with the following impairments/functional limitations:  weakness, impaired functional mobilty, impaired endurance, gait instability, impaired balance, impaired self care skills, impaired cardiopulmonary response to activity. Noted improvement in functional mobility this date. Pt completed mobility without physical assist and increased ambulation distance this date. However, pt continues to demo impaired endurance and generalized weakness, requiring intermittent seated rest breaks during gait and limiting further progression. Pt would continue to benefit from skilled acute PT in order to address current deficits and progress functional mobility.     Rehab Prognosis: Good; patient would benefit from acute skilled PT services to address these deficits and reach maximum level of function.    Recent Surgery: Procedure(s) (LRB):  INSERTION-LEFT VENTRICULAR ASSIST DEVICE (N/A)  INSERTION, GRAFT, PERICARDIUM  CLOSURE, WOUND, STERNUM 12 Days Post-Op    Plan:     During this hospitalization, patient to be seen 6 x/week to address the identified rehab impairments via gait training, therapeutic activities, therapeutic exercises, neuromuscular re-education and progress toward the following goals:    · Plan of Care Expires:  10/10/19    Subjective     Chief Complaint: nausea  Patient/Family Comments/goals: Pt reports feeling better than she did last week.  Pain/Comfort:  · Pain Rating 1: 0/10      Objective:     Communicated with RN prior to session.  Patient found supine with telemetry, LVAD upon  PT entry to room.     General Precautions: Standard, fall, LVAD, sternal   Orthopedic Precautions:N/A   Braces: N/A     Functional Mobility:  · Bed Mobility:     · Supine to Sit: stand by assistance with HOB elevated  · Transfers:    · Sit to Stand:  x1 rep with SBA with no AD from EOB; x4 reps with CGA with no AD  · Bed to Chair: contact guard assistance with  hand-held assist  using  Stand Pivot  · Gait: 52 ft. + 36 ft. + 30 ft. with CGA and HHA  · Pt taken to hallway via chair. Chair follow throughout with seated rest breaks between gait trials. Returned to room via chair following.  · Emergency bag present throughout  · demo'd decreased jonathan, decreased step length, mild gait instability, and impaired weight-shifting ability   · Cues provided for self-pacing and safety awareness       AM-PAC 6 CLICK MOBILITY  Turning over in bed (including adjusting bedclothes, sheets and blankets)?: 3  Sitting down on and standing up from a chair with arms (e.g., wheelchair, bedside commode, etc.): 3  Moving from lying on back to sitting on the side of the bed?: 3  Moving to and from a bed to a chair (including a wheelchair)?: 3  Need to walk in hospital room?: 3  Climbing 3-5 steps with a railing?: 1  Basic Mobility Total Score: 16       Therapeutic Activities and Exercises:   Pt found on wall power. Performed self-test with cues to do so. Pt read LVAD numbers aloud while friend recorded in binder. Switched from wall>battery power with set-up assist. Reviewed battery rotation. Organized and donned consolidation bag with cues for technique and assist for donning waist strap. Reviewed importance of 2 points of contact. Pt v/u.   Completed functional mobility as described above. Increased time taken with transitional movements 2* reported dizziness upon transferring to upright positions. Cues provided for breathing technique and focus on static point. Reported nausea during session, but no episode of emesis. RN notified.      Patient left up in chair with all lines intact, call button in reach, RN notified and pt's friend present..    GOALS:   Multidisciplinary Problems     Physical Therapy Goals        Problem: Physical Therapy Goal    Goal Priority Disciplines Outcome Goal Variances Interventions   Physical Therapy Goal     PT, PT/OT Ongoing, Progressing     Description:  Goals to be met by: 10/1/2019    Patient will increase functional independence with mobility by performin. Supine to sit with Contact Guard Assistance - not met  2. Sit to stand transfer with Supervision - not met  3. Gait  x 200 feet with Supervision  - not met  4. Ascend/descend 2 stair with Contact Guard Assistance - not met                      Time Tracking:     PT Received On: 19  PT Start Time: 844     PT Stop Time: 925  PT Total Time (min): 41 min     Billable Minutes: Gait Training 18 and Therapeutic Activity 23    Treatment Type: Treatment  PT/PTA: PT     PTA Visit Number: 0     Alecia Pace, PT, DPT   2019

## 2019-09-22 NOTE — PROGRESS NOTES
[facility]  Heart Transplant  Progress Note    Patient Name: Deborah Navas  MRN: [id number]  Admission Date: 9/4/2019  Hospital Length of Stay: 18 days   Attending Physician: Jolene Lua MD  Primary Care Provider: Primary Doctor No  Principal Problem:LVAD (left ventricular assist device) present    Subjective:     Interval History: No events overnight. No BM since last 2 days.      Continuous Infusions:   heparin (porcine) in D5W       Scheduled Meds:   aspirin  325 mg Oral Daily    furosemide  60 mg Intravenous BID    gabapentin  400 mg Oral BID    mirtazapine  15 mg Oral QHS    oxyCODONE  20 mg Oral Q12H    pantoprazole  40 mg Oral Daily    polyethylene glycol  17 g Oral Daily    ramelteon  8 mg Oral QHS    senna-docusate 8.6-50 mg  2 tablet Oral BID    simethicone  1 tablet Oral QID (PC + HS)    venlafaxine  150 mg Oral Daily    warfarin  5 mg Oral Daily     PRN Meds:albuterol sulfate, bisacodyl, glycerin 99.5% **AND** magnesium citrate **AND** sodium chloride 0.9%, hydrocortisone, hydrOXYzine HCl, magnesium hydroxide 400 mg/5 ml, magnesium sulfate IVPB, ondansetron, oxyCODONE, oxyCODONE-acetaminophen, promethazine (PHENERGAN) IVPB, simethicone, sodium chloride 0.9%    Review of patient's allergies indicates:   Allergen Reactions    Adhesive Blisters     Reaction to area in chest and up only    Codeine Itching     Objective:     Vital Signs (Most Recent):  Temp: 98.6 °F (37 °C) (09/22/19 0941)  Pulse: (!) 56 (09/22/19 0941)  Resp: 16 (09/22/19 0941)  BP: (!) 76/0 (09/22/19 0941)  SpO2: (!) 94 % (09/22/19 0941) Vital Signs (24h Range):  Temp:  [97.8 °F (36.6 °C)-98.9 °F (37.2 °C)] 98.6 °F (37 °C)  Pulse:  [44-80] 56  Resp:  [15-18] 16  SpO2:  [92 %-100 %] 94 %  BP: (76-95)/(0) 76/0     Patient Vitals for the past 72 hrs (Last 3 readings):   Weight   09/22/19 0500 106.5 kg (234 lb 12.6 oz)   09/21/19 0500 106.6 kg (235 lb 0.2 oz)   09/20/19 0837 106.1 kg (233 lb 14.5  oz)     Body mass index is 36.77 kg/m².      Intake/Output Summary (Last 24 hours) at 9/22/2019 1027  Last data filed at 9/22/2019 0800  Gross per 24 hour   Intake 330 ml   Output 950 ml   Net -620 ml       Hemodynamic Parameters:       Telemetry:  VAD artifact     Physical Exam  Constitutional: She is oriented to person, place, and time. She appears well-developed and well-nourished.   HENT:   Head: Normocephalic.   Eyes: Pupils are equal, round, and reactive to light.   Neck: Normal range of motion. Neck supple.   Cardiovascular: Normal rate and regular rhythm.   VAD hum   Pulmonary/Chest: Effort normal and breath sounds normal.   Abdominal: Soft. Bowel sounds are normal.   Musculoskeletal: Normal range of motion.   Neurological: She is alert and oriented to person, place, and time.   Skin: Skin is warm and dry.   Psychiatric: She has a normal mood and affect. Her behavior is normal.   Significant Labs:  CBC:  Recent Labs   Lab 09/20/19 0256 09/21/19 0322 09/22/19 0418   WBC 12.44 13.47* 12.29   RBC 3.84* 3.62* 3.58*   HGB 10.8* 10.0* 9.9*   HCT 35.0* 33.6* 32.7*   * 499* 421*   MCV 91 93 91   MCH 28.1 27.6 27.7   MCHC 30.9* 29.8* 30.3*     BNP:  Recent Labs   Lab 09/16/19  0243   *     CMP:  Recent Labs   Lab 09/20/19 0256 09/21/19 0322 09/22/19 0417   * 99 113*   CALCIUM 9.4 9.3 9.2   ALBUMIN 2.9* 2.7* 2.5*   PROT 6.8 6.4 6.6    134* 130*   K 4.3 4.4 4.6   CO2 31* 35* 29   CL 91* 89* 88*   BUN 29* 32* 32*   CREATININE 1.2 1.4 1.3   ALKPHOS 76 74 80   ALT 30 22 21   AST 17 17 29   BILITOT 0.6 0.5 0.5      Coagulation:   Recent Labs   Lab 09/20/19  0256 09/21/19 0322 09/22/19 0417   INR 3.6* 2.0* 1.7*   APTT 43.1* 44.5* 29.5     LDH:  Recent Labs   Lab 09/20/19  0256 09/21/19  0322 09/22/19  0417   * 269* 432*     Microbiology:  Microbiology Results (last 7 days)     Procedure Component Value Units Date/Time    HPV High Risk Genotypes, PCR [[id number]] Collected:  09/11/19  1330    Order Status:  Completed Updated:  09/16/19 1259     HPV High Risk type 16, PCR Negative     HPV High Risk type 18, PCR Negative     HPV other High Risk types, PCR Negative     Comment: Other HPV genotypes include:   31,33,35,39,45,51,52,56,58,59,66 and 68.         Narrative:       replaces order [id number]    Blood culture [[id number]] Collected:  09/11/19 0810    Order Status:  Completed Specimen:  Blood from Peripheral, Antecubital, Left Updated:  09/16/19 1012     Blood Culture, Routine No growth after 5 days.    Blood culture [[id number]] Collected:  09/11/19 0832    Order Status:  Completed Specimen:  Blood from Line, Jugular, Internal Right Updated:  09/16/19 1012     Blood Culture, Routine No growth after 5 days.          I have reviewed all pertinent labs within the past 24 hours.    Estimated Creatinine Clearance: 65.8 mL/min (based on SCr of 1.3 mg/dL).    Diagnostic Results:  I have reviewed and interpreted all pertinent imaging results/findings within the past 24 hours.    Assessment and Plan:     No notes on file    * LVAD (left ventricular assist device) present  -HeartMate 3 Implanted 9/10/19 as DT.  -HTS Primary.  -Implanted by Dr. Walden  -INR  1.8 . Goal INR 2.0-3.0.   Will bridge her with heparin and coumadin 5mg today   -Antiplatelets  mg.  -LDH is stable overall today. Will continue to monitor daily.  -Speed set at 5200  -Not listed for OHTx.  - weaned off.   -Echo 9/18: LVIDD 5.69, TAPSE 0.86, AV does not open. The ventricular septum is at midline.   - JVP until jaw. On lasix 60 BID. Increase it to 60 TID   -If dizziness/lightheadeness continues, may need to consider RHC next week.   -Continue Pt/OT/VAD education    Procedure: Device Interrogation Including analysis of device parameters  Current Settings: Ventricular Assist Device  Review of device function is stable/unstable stable    TXP LVAD INTERROGATIONS 9/22/2019 9/22/2019 9/21/2019 9/21/2019 9/21/2019 9/21/2019 9/21/2019    Type HeartMate3 HeartMate3 HeartMate3 HeartMate3 HeartMate3 HeartMate3 HeartMate3   Flow 3.7 3.5 3.8 3.7 3.7 3.6 3.7   Speed 5200 5200 5200 5200  5200 5200 5200   PI 4.1 3.1 3 3.5 4.2 5.1 3.2   Power ([place]) 3.6 3.6 3.5 3.6 3.5 3.6 3.2   LSL 4800 4800 4800 4800 4800 4800 4800   Pulsatility No Pulse No Pulse No Pulse No Pulse No Pulse No Pulse Intermittent pulse       Atrial flutter  -Continue Amio/anticoagulation.     Constipation  -Continue Senna/Colace/miralax.  -Add prn milk of Mag today.     Pleural effusion  -Bilateral pleural effusions present, L>R.    CKD (chronic kidney disease)  -Creatinine at baseline  - Avoid nephrotoxic agents    Abnormal CT scan  -focal opacity found at right base.  DDx: atelectasis.  Recommending repeat CT in 3 months    Enlarged thyroid  Ultrasound done and showed enlarged multinodular thyroid which warrants surveillance in one year.   - TSH and fT4 wnl    History of ventricular tachycardia  -In the setting of hypokalemia  -Monitor electrolytes closely    Acute on chronic combined systolic and diastolic heart failure  NICM EF 20% s/p LVAD 9/10  RHC: done on admit 9/4/19 RA: 20/ 20/ 18 RV: 60/ 8/ 18 PA: 60/ 32/ 45 PWP: 43/ 70/ 40 . Cardiac output was 2.27 by Fitz. Cardiac index is 1.04 L/min/m2. O2 Sat: PA 34%. AO 95% PVR 2.2 PALMER    -see s/p LVAD      Ventricular tachyarrhythmia  - On amio at home, hold for now (see above)   - Goal K >4 and Mg >2  - Monitor on telemetry    ICD (implantable cardioverter-defibrillator) in place  -Medtronic CRT-D  -Patient began v-pacing at 60 and 9/14/19 began having phrenic nerve stimulation. Amiodarone held and EP consulted, device settings changed and PNS ceased.   -Underlying rhythm a flutter with slow ventricular response at 32 bpm        Dandre Hunter MD  Heart Transplant  [facility]

## 2019-09-22 NOTE — NURSING
LVAD dressing change completed by family member at bedside, only mistake was almost touching sterile gauze with clean gloves instead of changing gloves, all other steps completed successfully and sterile technique maintained throughout.     DLES a 2 with expected redness and small amount of drainage and swelling, sutures present. No S/SX of infection.

## 2019-09-22 NOTE — SUBJECTIVE & OBJECTIVE
Interval History: No events overnight. No BM since last 2 days.      Continuous Infusions:   heparin (porcine) in D5W       Scheduled Meds:   aspirin  325 mg Oral Daily    furosemide  60 mg Intravenous BID    gabapentin  400 mg Oral BID    mirtazapine  15 mg Oral QHS    oxyCODONE  20 mg Oral Q12H    pantoprazole  40 mg Oral Daily    polyethylene glycol  17 g Oral Daily    ramelteon  8 mg Oral QHS    senna-docusate 8.6-50 mg  2 tablet Oral BID    simethicone  1 tablet Oral QID (PC + HS)    venlafaxine  150 mg Oral Daily    warfarin  5 mg Oral Daily     PRN Meds:albuterol sulfate, bisacodyl, glycerin 99.5% **AND** magnesium citrate **AND** sodium chloride 0.9%, hydrocortisone, hydrOXYzine HCl, magnesium hydroxide 400 mg/5 ml, magnesium sulfate IVPB, ondansetron, oxyCODONE, oxyCODONE-acetaminophen, promethazine (PHENERGAN) IVPB, simethicone, sodium chloride 0.9%    Review of patient's allergies indicates:   Allergen Reactions    Adhesive Blisters     Reaction to area in chest and up only    Codeine Itching     Objective:     Vital Signs (Most Recent):  Temp: 98.6 °F (37 °C) (09/22/19 0941)  Pulse: (!) 56 (09/22/19 0941)  Resp: 16 (09/22/19 0941)  BP: (!) 76/0 (09/22/19 0941)  SpO2: (!) 94 % (09/22/19 0941) Vital Signs (24h Range):  Temp:  [97.8 °F (36.6 °C)-98.9 °F (37.2 °C)] 98.6 °F (37 °C)  Pulse:  [44-80] 56  Resp:  [15-18] 16  SpO2:  [92 %-100 %] 94 %  BP: (76-95)/(0) 76/0     Patient Vitals for the past 72 hrs (Last 3 readings):   Weight   09/22/19 0500 106.5 kg (234 lb 12.6 oz)   09/21/19 0500 106.6 kg (235 lb 0.2 oz)   09/20/19 0837 106.1 kg (233 lb 14.5 oz)     Body mass index is 36.77 kg/m².      Intake/Output Summary (Last 24 hours) at 9/22/2019 1027  Last data filed at 9/22/2019 0800  Gross per 24 hour   Intake 330 ml   Output 950 ml   Net -620 ml       Hemodynamic Parameters:       Telemetry:  VAD artifact     Physical Exam  Constitutional: She is oriented to person, place, and time. She  appears well-developed and well-nourished.   HENT:   Head: Normocephalic.   Eyes: Pupils are equal, round, and reactive to light.   Neck: Normal range of motion. Neck supple.   Cardiovascular: Normal rate and regular rhythm.   VAD hum   Pulmonary/Chest: Effort normal and breath sounds normal.   Abdominal: Soft. Bowel sounds are normal.   Musculoskeletal: Normal range of motion.   Neurological: She is alert and oriented to person, place, and time.   Skin: Skin is warm and dry.   Psychiatric: She has a normal mood and affect. Her behavior is normal.   Significant Labs:  CBC:  Recent Labs   Lab 09/20/19 0256 09/21/19 0322 09/22/19 0418   WBC 12.44 13.47* 12.29   RBC 3.84* 3.62* 3.58*   HGB 10.8* 10.0* 9.9*   HCT 35.0* 33.6* 32.7*   * 499* 421*   MCV 91 93 91   MCH 28.1 27.6 27.7   MCHC 30.9* 29.8* 30.3*     BNP:  Recent Labs   Lab 09/16/19  0243   *     CMP:  Recent Labs   Lab 09/20/19 0256 09/21/19 0322 09/22/19 0417   * 99 113*   CALCIUM 9.4 9.3 9.2   ALBUMIN 2.9* 2.7* 2.5*   PROT 6.8 6.4 6.6    134* 130*   K 4.3 4.4 4.6   CO2 31* 35* 29   CL 91* 89* 88*   BUN 29* 32* 32*   CREATININE 1.2 1.4 1.3   ALKPHOS 76 74 80   ALT 30 22 21   AST 17 17 29   BILITOT 0.6 0.5 0.5      Coagulation:   Recent Labs   Lab 09/20/19 0256 09/21/19 0322 09/22/19 0417   INR 3.6* 2.0* 1.7*   APTT 43.1* 44.5* 29.5     LDH:  Recent Labs   Lab 09/20/19 0256 09/21/19 0322 09/22/19 0417   * 269* 432*     Microbiology:  Microbiology Results (last 7 days)     Procedure Component Value Units Date/Time    HPV High Risk Genotypes, PCR [[id number]] Collected:  09/11/19 1330    Order Status:  Completed Updated:  09/16/19 1259     HPV High Risk type 16, PCR Negative     HPV High Risk type 18, PCR Negative     HPV other High Risk types, PCR Negative     Comment: Other HPV genotypes include:   31,33,35,39,45,51,52,56,58,59,66 and 68.         Narrative:       replaces order [id number]    Blood culture [[id number]]  Collected:  09/11/19 0810    Order Status:  Completed Specimen:  Blood from Peripheral, Antecubital, Left Updated:  09/16/19 1012     Blood Culture, Routine No growth after 5 days.    Blood culture [[id number]] Collected:  09/11/19 0832    Order Status:  Completed Specimen:  Blood from Line, Jugular, Internal Right Updated:  09/16/19 1012     Blood Culture, Routine No growth after 5 days.          I have reviewed all pertinent labs within the past 24 hours.    Estimated Creatinine Clearance: 65.8 mL/min (based on SCr of 1.3 mg/dL).    Diagnostic Results:  I have reviewed and interpreted all pertinent imaging results/findings within the past 24 hours.

## 2019-09-22 NOTE — ASSESSMENT & PLAN NOTE
-HeartMate 3 Implanted 9/10/19 as DT.  -HTS Primary.  -Implanted by Dr. Walden  -INR  1.8 . Goal INR 2.0-3.0.   Will bridge her with heparin and coumadin 5mg today   -Antiplatelets  mg.  -LDH is stable overall today. Will continue to monitor daily.  -Speed set at 5200  -Not listed for OHTx.  - weaned off.   -Echo 9/18: LVIDD 5.69, TAPSE 0.86, AV does not open. The ventricular septum is at midline.   - JVP until jaw. On lasix 60 BID. Increase it to 60 TID   -If dizziness/lightheadeness continues, may need to consider RHC next week.   -Continue Pt/OT/VAD education    Procedure: Device Interrogation Including analysis of device parameters  Current Settings: Ventricular Assist Device  Review of device function is stable/unstable stable    TXP LVAD INTERROGATIONS 9/22/2019 9/22/2019 9/21/2019 9/21/2019 9/21/2019 9/21/2019 9/21/2019   Type HeartMate3 HeartMate3 HeartMate3 HeartMate3 HeartMate3 HeartMate3 HeartMate3   Flow 3.7 3.5 3.8 3.7 3.7 3.6 3.7   Speed 5200 5200 5200 5200  5200 5200 5200   PI 4.1 3.1 3 3.5 4.2 5.1 3.2   Power ([place]rellana) 3.6 3.6 3.5 3.6 3.5 3.6 3.2   LSL 4800 4800 4800 4800 4800 4800 4800   Pulsatility No Pulse No Pulse No Pulse No Pulse No Pulse No Pulse Intermittent pulse

## 2019-09-23 LAB
ALBUMIN SERPL BCP-MCNC: 2.7 G/DL (ref 3.5–5.2)
ALP SERPL-CCNC: 88 U/L (ref 55–135)
ALT SERPL W/O P-5'-P-CCNC: 29 U/L (ref 10–44)
ANION GAP SERPL CALC-SCNC: 15 MMOL/L (ref 8–16)
APTT BLDCRRT: 33.9 SEC (ref 21–32)
APTT BLDCRRT: 33.9 SEC (ref 21–32)
APTT BLDCRRT: 45.6 SEC (ref 21–32)
AST SERPL-CCNC: 42 U/L (ref 10–40)
BASOPHILS # BLD AUTO: 0.06 K/UL (ref 0–0.2)
BASOPHILS # BLD AUTO: 0.06 K/UL (ref 0–0.2)
BASOPHILS NFR BLD: 0.5 % (ref 0–1.9)
BASOPHILS NFR BLD: 0.5 % (ref 0–1.9)
BILIRUB SERPL-MCNC: 0.6 MG/DL (ref 0.1–1)
BNP SERPL-MCNC: 414 PG/ML (ref 0–99)
BUN SERPL-MCNC: 33 MG/DL (ref 6–20)
CALCIUM SERPL-MCNC: 9.3 MG/DL (ref 8.7–10.5)
CHLORIDE SERPL-SCNC: 88 MMOL/L (ref 95–110)
CO2 SERPL-SCNC: 32 MMOL/L (ref 23–29)
CREAT SERPL-MCNC: 1.6 MG/DL (ref 0.5–1.4)
DIFFERENTIAL METHOD: ABNORMAL
DIFFERENTIAL METHOD: ABNORMAL
EOSINOPHIL # BLD AUTO: 0.4 K/UL (ref 0–0.5)
EOSINOPHIL # BLD AUTO: 0.4 K/UL (ref 0–0.5)
EOSINOPHIL NFR BLD: 3.1 % (ref 0–8)
EOSINOPHIL NFR BLD: 3.1 % (ref 0–8)
ERYTHROCYTE [DISTWIDTH] IN BLOOD BY AUTOMATED COUNT: 15.2 % (ref 11.5–14.5)
ERYTHROCYTE [DISTWIDTH] IN BLOOD BY AUTOMATED COUNT: 15.2 % (ref 11.5–14.5)
EST. GFR  (AFRICAN AMERICAN): 43.3 ML/MIN/1.73 M^2
EST. GFR  (NON AFRICAN AMERICAN): 37.6 ML/MIN/1.73 M^2
GLUCOSE SERPL-MCNC: 117 MG/DL (ref 70–110)
HCT VFR BLD AUTO: 33 % (ref 37–48.5)
HCT VFR BLD AUTO: 33 % (ref 37–48.5)
HGB BLD-MCNC: 10.4 G/DL (ref 12–16)
HGB BLD-MCNC: 10.4 G/DL (ref 12–16)
IMM GRANULOCYTES # BLD AUTO: 0.21 K/UL (ref 0–0.04)
IMM GRANULOCYTES # BLD AUTO: 0.21 K/UL (ref 0–0.04)
IMM GRANULOCYTES NFR BLD AUTO: 1.9 % (ref 0–0.5)
IMM GRANULOCYTES NFR BLD AUTO: 1.9 % (ref 0–0.5)
INR PPP: 1.5 (ref 0.8–1.2)
LDH SERPL L TO P-CCNC: 345 U/L (ref 110–260)
LYMPHOCYTES # BLD AUTO: 1.1 K/UL (ref 1–4.8)
LYMPHOCYTES # BLD AUTO: 1.1 K/UL (ref 1–4.8)
LYMPHOCYTES NFR BLD: 9.7 % (ref 18–48)
LYMPHOCYTES NFR BLD: 9.7 % (ref 18–48)
MAGNESIUM SERPL-MCNC: 2.6 MG/DL (ref 1.6–2.6)
MCH RBC QN AUTO: 28.9 PG (ref 27–31)
MCH RBC QN AUTO: 28.9 PG (ref 27–31)
MCHC RBC AUTO-ENTMCNC: 31.5 G/DL (ref 32–36)
MCHC RBC AUTO-ENTMCNC: 31.5 G/DL (ref 32–36)
MCV RBC AUTO: 92 FL (ref 82–98)
MCV RBC AUTO: 92 FL (ref 82–98)
MONOCYTES # BLD AUTO: 1 K/UL (ref 0.3–1)
MONOCYTES # BLD AUTO: 1 K/UL (ref 0.3–1)
MONOCYTES NFR BLD: 8.7 % (ref 4–15)
MONOCYTES NFR BLD: 8.7 % (ref 4–15)
NEUTROPHILS # BLD AUTO: 8.5 K/UL (ref 1.8–7.7)
NEUTROPHILS # BLD AUTO: 8.5 K/UL (ref 1.8–7.7)
NEUTROPHILS NFR BLD: 76.1 % (ref 38–73)
NEUTROPHILS NFR BLD: 76.1 % (ref 38–73)
NRBC BLD-RTO: 0 /100 WBC
NRBC BLD-RTO: 0 /100 WBC
PLATELET # BLD AUTO: 563 K/UL (ref 150–350)
PLATELET # BLD AUTO: 563 K/UL (ref 150–350)
PMV BLD AUTO: 9.8 FL (ref 9.2–12.9)
PMV BLD AUTO: 9.8 FL (ref 9.2–12.9)
POTASSIUM SERPL-SCNC: 4.6 MMOL/L (ref 3.5–5.1)
PROT SERPL-MCNC: 6.7 G/DL (ref 6–8.4)
PROTHROMBIN TIME: 15.2 SEC (ref 9–12.5)
RBC # BLD AUTO: 3.6 M/UL (ref 4–5.4)
RBC # BLD AUTO: 3.6 M/UL (ref 4–5.4)
SODIUM SERPL-SCNC: 135 MMOL/L (ref 136–145)
WBC # BLD AUTO: 11.12 K/UL (ref 3.9–12.7)
WBC # BLD AUTO: 11.12 K/UL (ref 3.9–12.7)

## 2019-09-23 PROCEDURE — 25000003 PHARM REV CODE 250: Mod: NTX | Performed by: STUDENT IN AN ORGANIZED HEALTH CARE EDUCATION/TRAINING PROGRAM

## 2019-09-23 PROCEDURE — 85730 THROMBOPLASTIN TIME PARTIAL: CPT | Mod: 91,NTX

## 2019-09-23 PROCEDURE — 93750 INTERROGATION VAD IN PERSON: CPT | Mod: NTX,,, | Performed by: INTERNAL MEDICINE

## 2019-09-23 PROCEDURE — 25000003 PHARM REV CODE 250: Mod: NTX | Performed by: NURSE PRACTITIONER

## 2019-09-23 PROCEDURE — 97535 SELF CARE MNGMENT TRAINING: CPT | Mod: NTX

## 2019-09-23 PROCEDURE — 83880 ASSAY OF NATRIURETIC PEPTIDE: CPT | Mod: NTX

## 2019-09-23 PROCEDURE — 25000003 PHARM REV CODE 250: Mod: NTX | Performed by: HOSPITALIST

## 2019-09-23 PROCEDURE — 97116 GAIT TRAINING THERAPY: CPT | Mod: NTX

## 2019-09-23 PROCEDURE — 25000003 PHARM REV CODE 250: Mod: NTX | Performed by: THORACIC SURGERY (CARDIOTHORACIC VASCULAR SURGERY)

## 2019-09-23 PROCEDURE — 99233 PR SUBSEQUENT HOSPITAL CARE,LEVL III: ICD-10-PCS | Mod: NTX,,, | Performed by: INTERNAL MEDICINE

## 2019-09-23 PROCEDURE — 25000003 PHARM REV CODE 250: Mod: NTX | Performed by: INTERNAL MEDICINE

## 2019-09-23 PROCEDURE — 83735 ASSAY OF MAGNESIUM: CPT | Mod: NTX

## 2019-09-23 PROCEDURE — 85730 THROMBOPLASTIN TIME PARTIAL: CPT | Mod: NTX

## 2019-09-23 PROCEDURE — 20600001 HC STEP DOWN PRIVATE ROOM: Mod: NTX

## 2019-09-23 PROCEDURE — 85610 PROTHROMBIN TIME: CPT | Mod: NTX

## 2019-09-23 PROCEDURE — 97530 THERAPEUTIC ACTIVITIES: CPT | Mod: NTX

## 2019-09-23 PROCEDURE — 93010 EKG 12-LEAD: ICD-10-PCS | Mod: NTX,,, | Performed by: INTERNAL MEDICINE

## 2019-09-23 PROCEDURE — 63600175 PHARM REV CODE 636 W HCPCS: Mod: NTX | Performed by: INTERNAL MEDICINE

## 2019-09-23 PROCEDURE — 63600175 PHARM REV CODE 636 W HCPCS: Mod: NTX | Performed by: HOSPITALIST

## 2019-09-23 PROCEDURE — 27000248 HC VAD-ADDITIONAL DAY: Mod: NTX

## 2019-09-23 PROCEDURE — 93750 PR INTERROGATE VENT ASSIST DEV, IN PERSON, W PHYSICIAN ANALYSIS: ICD-10-PCS | Mod: NTX,,, | Performed by: INTERNAL MEDICINE

## 2019-09-23 PROCEDURE — 36415 COLL VENOUS BLD VENIPUNCTURE: CPT | Mod: NTX

## 2019-09-23 PROCEDURE — 99233 SBSQ HOSP IP/OBS HIGH 50: CPT | Mod: NTX,,, | Performed by: INTERNAL MEDICINE

## 2019-09-23 PROCEDURE — 83615 LACTATE (LD) (LDH) ENZYME: CPT | Mod: NTX

## 2019-09-23 PROCEDURE — 80053 COMPREHEN METABOLIC PANEL: CPT | Mod: NTX

## 2019-09-23 PROCEDURE — 93010 ELECTROCARDIOGRAM REPORT: CPT | Mod: NTX,,, | Performed by: INTERNAL MEDICINE

## 2019-09-23 PROCEDURE — 93005 ELECTROCARDIOGRAM TRACING: CPT | Mod: NTX

## 2019-09-23 PROCEDURE — 85025 COMPLETE CBC W/AUTO DIFF WBC: CPT | Mod: NTX

## 2019-09-23 RX ORDER — FUROSEMIDE 40 MG/1
40 TABLET ORAL 2 TIMES DAILY
Status: DISCONTINUED | OUTPATIENT
Start: 2019-09-23 | End: 2019-09-26

## 2019-09-23 RX ADMIN — SENNOSIDES AND DOCUSATE SODIUM 2 TABLET: 8.6; 5 TABLET ORAL at 08:09

## 2019-09-23 RX ADMIN — GABAPENTIN 400 MG: 100 CAPSULE ORAL at 09:09

## 2019-09-23 RX ADMIN — FUROSEMIDE 40 MG: 40 TABLET ORAL at 05:09

## 2019-09-23 RX ADMIN — POLYETHYLENE GLYCOL 3350 17 G: 17 POWDER, FOR SOLUTION ORAL at 08:09

## 2019-09-23 RX ADMIN — MAGNESIUM HYDROXIDE 2400 MG: 400 SUSPENSION ORAL at 04:09

## 2019-09-23 RX ADMIN — RAMELTEON 8 MG: 8 TABLET ORAL at 09:09

## 2019-09-23 RX ADMIN — MIRTAZAPINE 15 MG: 15 TABLET, FILM COATED ORAL at 09:09

## 2019-09-23 RX ADMIN — ASPIRIN 325 MG: 325 TABLET, COATED ORAL at 08:09

## 2019-09-23 RX ADMIN — SENNOSIDES AND DOCUSATE SODIUM 2 TABLET: 8.6; 5 TABLET ORAL at 09:09

## 2019-09-23 RX ADMIN — SIMETHICONE CHEW TAB 80 MG 80 MG: 80 TABLET ORAL at 10:09

## 2019-09-23 RX ADMIN — SIMETHICONE CHEW TAB 80 MG 80 MG: 80 TABLET ORAL at 02:09

## 2019-09-23 RX ADMIN — VENLAFAXINE 150 MG: 37.5 TABLET ORAL at 08:09

## 2019-09-23 RX ADMIN — OXYCODONE HYDROCHLORIDE 20 MG: 20 TABLET, FILM COATED, EXTENDED RELEASE ORAL at 09:09

## 2019-09-23 RX ADMIN — ONDANSETRON 4 MG: 2 INJECTION INTRAMUSCULAR; INTRAVENOUS at 10:09

## 2019-09-23 RX ADMIN — GABAPENTIN 400 MG: 100 CAPSULE ORAL at 08:09

## 2019-09-23 RX ADMIN — HEPARIN SODIUM 13 UNITS/KG/HR: 10000 INJECTION, SOLUTION INTRAVENOUS at 01:09

## 2019-09-23 RX ADMIN — PANTOPRAZOLE SODIUM 40 MG: 40 TABLET, DELAYED RELEASE ORAL at 08:09

## 2019-09-23 RX ADMIN — SIMETHICONE CHEW TAB 80 MG 80 MG: 80 TABLET ORAL at 09:09

## 2019-09-23 RX ADMIN — SIMETHICONE CHEW TAB 80 MG 80 MG: 80 TABLET ORAL at 05:09

## 2019-09-23 RX ADMIN — WARFARIN SODIUM 5 MG: 5 TABLET ORAL at 04:09

## 2019-09-23 RX ADMIN — OXYCODONE HYDROCHLORIDE 20 MG: 20 TABLET, FILM COATED, EXTENDED RELEASE ORAL at 08:09

## 2019-09-23 NOTE — PROGRESS NOTES
09/22/2019  Abhijit Perez    Current provider:  Jolene Lua MD      I, Abhijit Perez, rounded on Deborah Navas to ensure all mechanical assist device settings (IABP or VAD) were appropriate and all parameters were within limits.  I was able to ensure all back up equipment was present, the staff had no issues, and the Perfusion Department daily rounding was complete.    11:30 PM

## 2019-09-23 NOTE — PLAN OF CARE
Pt AAO and VSS. Pt family/caregiver did dressing change 9/22 and needs to be checked off. Pt on heparin gtt at prescribed rate as INR bridge. Pt LVAD with smooth hum and numbers WNL. Pt  voiding well. Pt satting well on RA. Pt educated on fall risk overnight,pt remained free from falls/trauma/injury. Denies chest pain, SOB, palpitations, dizziness, pain, or discomfort. Plan of care reviewed with pt, all questions answered. Bed locked in lowest position, call bell within reach, no acute distress noted, will continue to monitor.

## 2019-09-23 NOTE — SUBJECTIVE & OBJECTIVE
Interval History: Called back due to concern for aflutter. States she is having dizziness that is prolonged going from sitting to standing with some associated nausea. Per patient, hx of afib in the past and was followed by Dr. Richard at [facility].     Review of Systems   Constitution: Negative for chills and fever.   Cardiovascular: Negative for chest pain, palpitations and syncope.   Respiratory: Negative for shortness of breath.    Gastrointestinal: Negative for nausea and vomiting.   Neurological: Positive for dizziness.     Objective:     Vital Signs (Most Recent):  Temp: 98.6 °F (37 °C) (09/23/19 1217)  Pulse: 60 (09/23/19 1100)  Resp: 18 (09/23/19 1217)  BP: (!) 86/0 (09/23/19 1221)  SpO2: (!) 91 % (09/23/19 1217) Vital Signs (24h Range):  Temp:  [97.7 °F (36.5 °C)-98.7 °F (37.1 °C)] 98.6 °F (37 °C)  Pulse:  [50-71] 60  Resp:  [15-18] 18  SpO2:  [91 %-100 %] 91 %  BP: (78-86)/(0) 86/0     Weight: 107.3 kg (236 lb 10.6 oz)  Body mass index is 37.07 kg/m².     SpO2: (!) 91 %  O2 Device (Oxygen Therapy): room air    Physical Exam   Constitutional: She is oriented to person, place, and time. She appears well-developed and well-nourished.   HENT:   Head: Normocephalic.   Eyes: Pupils are equal, round, and reactive to light.   Neck: Normal range of motion. Neck supple.   Cardiovascular: Normal rate and regular rhythm.   VAD hum   Pulmonary/Chest: Effort normal and breath sounds normal.   Abdominal: Soft. Bowel sounds are normal.   Musculoskeletal: Normal range of motion.   Neurological: She is alert and oriented to person, place, and time.   Skin: Skin is warm and dry.   Psychiatric: She has a normal mood and affect. Her behavior is normal.   Nursing note and vitals reviewed.    Significant Labs:   EP:   Recent Labs   Lab 09/22/19  0417  09/22/19  0418 09/22/19  1039 09/23/19  0458   *  --   --   --  135*   K 4.6  --   --   --  4.6   CL 88*  --   --   --  88*   CO2 29  --   --   --  32*   *   --   --   --  117*   BUN 32*  --   --   --  33*   CREATININE 1.3  --   --   --  1.6*   CALCIUM 9.2  --   --   --  9.3   PROT 6.6  --   --   --  6.7   ALBUMIN 2.5*  --   --   --  2.7*   BILITOT 0.5  --   --   --  0.6   ALKPHOS 80  --   --   --  88   AST 29  --   --   --  42*   ALT 21  --   --   --  29   ANIONGAP 13  --   --   --  15   ESTGFRAFRICA 55.7*  --   --   --  43.3*   EGFRNONAA 48.3*  --   --   --  37.6*   WBC  --   --  12.29 13.43* 11.12  11.12   HGB  --   --  9.9* 10.1* 10.4*  10.4*   HCT  --    < > 32.7* 33.5* 33.0*  33.0*   PLT  --   --  421* 525* 563*  563*   INR 1.7*  --   --  1.7* 1.5*    < > = values in this interval not displayed.       Significant Imaging: telemetry- Underlying LVAD artifact. Possible aflutter

## 2019-09-23 NOTE — ASSESSMENT & PLAN NOTE
Previous history of afib per the patient +/- aflutter with noted baseline rhythm of aflutter on last device interrogation and symptomatic with lightheaded/dizziness Will plan for NADINE/DCCV tomorrow.      Recs:   - NPO @ midnight  - Will plan with NADINE given previous hx and uncertainty with AC hx

## 2019-09-23 NOTE — PT/OT/SLP PROGRESS
"Occupational Therapy   Treatment    Name: Deborah Navas  MRN: [id number]  Admitting Diagnosis:  LVAD (left ventricular assist device) present  13 Days Post-Op    Recommendations:     Discharge Recommendations: home health PT  Discharge Equipment Recommendations:  none  Barriers to discharge:  None    Assessment:     Deborah Navas is a 48 y.o. female with a medical diagnosis of LVAD (left ventricular assist device) present.  She presents with performance deficits affecting function are impaired endurance, impaired self care skills, weakness, impaired sensation, impaired functional mobilty, gait instability, impaired balance, impaired cardiopulmonary response to activity. Pt tolerated session well and is progressing well towards all goals. Pt would benefit from continued skilled acute OT services in order to maximize independence and safety with ADLs and functional mobility to ensure safe return to PLOF in the least restrictive environment.      Rehab Prognosis:  Good; patient would benefit from acute skilled OT services to address these deficits and reach maximum level of function.       Plan:     Patient to be seen 6 x/week to address the above listed problems via self-care/home management, therapeutic activities, therapeutic exercises  · Plan of Care Expires: 10/11/19  · Plan of Care Reviewed with: patient, caregiver    Subjective     Pain/Comfort:  · Pain Rating 1: 0/10  · Pain Rating Post-Intervention 1: 0/10    Objective:     Communicated with: RN prior to session.  Patient found HOB elevated with telemetry, LVAD, peripheral IV upon OT entry to room. Pt agreeable to therapy session. Pt stated, " I was just about to take a nap."     General Precautions: Standard, fall, LVAD, sternal   Orthopedic Precautions:N/A   Braces: N/A     Occupational Performance:     Bed Mobility:    · Patient completed Rolling/Turning to Left with  stand by assistance  · Patient completed Supine to Sit with stand by " assistance and HOB elevated   · Patient completed Sit to Supine with stand by assistance     Functional Mobility/Transfers:  · Patient completed Sit <> Stand Transfer (EOB and bedside chair) with close stand by assistance  with  no assistive device   · Min verbal cues for maintaining sternal precautions   · Patient completed Toilet Transfer with functional ambulation to toilet and step transfer technique with stand by assistance with  no AD  · Functional Mobility: Pt engaging in functional mobility within pt's room with CGA using no AD  in order to maximize functional activity tolerance and standing balance required for engagement in occupations of choice.   · Pt declined to performed further distance in hallway due to fatigue and pt requesting to return to bed  · No LOB but slight instability noted.      Activities of Daily Living:  · Grooming: supervision/set-up A   · Pt performed oral care ans brushed hair in standing at the sink with SBA for standing balance   · Pt educated on maintaining sternal precautions while standing at the sink   · Toileting: stand by assistance for clothing management and hygiene   · Hygiene performed in sitting with supervision     [facility] 6 Click ADL: 18    Treatment & Education:  - Pt educated on role of OT, POC, and goals for therapy.    - Pt found with LVAD on battery power and previously completed self- test and recorded numbers in binder in AM. Pt able to verbalized transitioning from wall power <> battery power to therapist. Pt educated on importance of sleeping on wall power vs battery power, and to avoid sleeping on driveline site. Pt educated on correct positioning of consolidation bag. Pt verbalized 5/5 contents within emergency bag and 3/3 sternal precautions.  - pt reported minimal lightheadedness upon sitting EOB and during functional mobility in room.   - Pt instructed to perform functional mobility later in PM with nsg in order to further promote mote functional mobility  and overall strength/endurnace.   - Educated pt on being appropriate to transfer with nsg and PCT with x 1 person assist for safety   - Pt left with LVAD on battery power within consolidation bag.   - pt educated on next potential therapy session of performing full self-care routine including UB/LB dressing and donning vest.   - Pt completed ADLs and functional mobility for treatment session as noted above   - Pt verbalized understanding. Pt expressed no further concerns/questions.  - whiteboard updated       Patient left HOB elevated with all lines intact, call button in reach and RN notifiedEducation:      GOALS:   Multidisciplinary Problems     Occupational Therapy Goals        Problem: Occupational Therapy Goal    Goal Priority Disciplines Outcome Interventions   Occupational Therapy Goal     OT, PT/OT Ongoing, Progressing    Description:  Goals to be met by: 10/11/19     Patient will increase functional independence with ADLs by performing:    UE Dressing with [place].  LE Dressing with [place].  Grooming while standing at sink with [place].  Toileting from toilet with [place] for hygiene and clothing management.   Bathing from  shower chair/bench with [place].  Toilet transfer to toilet with [place].  Increased functional strength to WFL for B UE.  Upper extremity exercise program x15 reps per handout, with independence.                       Time Tracking:     OT Date of Treatment: 09/23/19  OT Start Time: 1417  OT Stop Time: 1436  OT Total Time (min): 19 min    Billable Minutes:Self Care/Home Management 19    Johanna Oliva OT  9/23/2019

## 2019-09-23 NOTE — PLAN OF CARE
Problem: Occupational Therapy Goal  Goal: Occupational Therapy Goal  Description  Goals to be met by: 10/11/19     Patient will increase functional independence with ADLs by performing:    UE Dressing with [place].  LE Dressing with [place].  Grooming while standing at sink with [place].  Toileting from toilet with [place] for hygiene and clothing management.   Bathing from  shower chair/bench with [place].  Toilet transfer to toilet with [place].  Increased functional strength to WFL for B UE.  Upper extremity exercise program x15 reps per handout, with independence.      Outcome: Ongoing, Progressing       Johanna Oliva, OTR/L  Pager: [phone]  9/23/2019

## 2019-09-23 NOTE — PROGRESS NOTES
[facility]  Heart Transplant  Progress Note    Patient Name: Deborah Navas  MRN: [id number]  Admission Date: 9/4/2019  Hospital Length of Stay: 19 days  Attending Physician: Jolene Lua MD  Primary Care Provider: Primary Doctor No  Principal Problem:LVAD (left ventricular assist device) present    Subjective:     Interval History: Feeling much better this morning. Working with PT/OT in [place]. No BM for past 2 days.      heparin (porcine) in D5W 13 Units/kg/hr (09/23/19 0759)     Scheduled Meds:   aspirin  325 mg Oral Daily    gabapentin  400 mg Oral BID    mirtazapine  15 mg Oral QHS    oxyCODONE  20 mg Oral Q12H    pantoprazole  40 mg Oral Daily    polyethylene glycol  17 g Oral Daily    ramelteon  8 mg Oral QHS    senna-docusate 8.6-50 mg  2 tablet Oral BID    simethicone  1 tablet Oral QID (PC + HS)    venlafaxine  150 mg Oral Daily    warfarin  5 mg Oral Daily     PRN Meds:albuterol sulfate, bisacodyl, glycerin 99.5% **AND** magnesium citrate **AND** sodium chloride 0.9%, hydrocortisone, hydrOXYzine HCl, magnesium hydroxide 400 mg/5 ml, magnesium sulfate IVPB, ondansetron, oxyCODONE, oxyCODONE-acetaminophen, promethazine (PHENERGAN) IVPB, simethicone, sodium chloride 0.9%    Review of patient's allergies indicates:   Allergen Reactions    Adhesive Blisters     Reaction to area in chest and up only    Codeine Itching     Objective:     Vital Signs (Most Recent):  Temp: 98.7 °F (37.1 °C) (09/23/19 0806)  Pulse: 71 (09/23/19 0806)  Resp: 18 (09/23/19 0806)  BP: (!) 78/0 (09/23/19 0806)  SpO2: 96 % (09/23/19 0806) Vital Signs (24h Range):  Temp:  [97.7 °F (36.5 °C)-98.7 °F (37.1 °C)] 98.7 °F (37.1 °C)  Pulse:  [50-96] 71  Resp:  [15-18] 18  SpO2:  [94 %-100 %] 96 %  BP: (78-86)/(0) 78/0     Patient Vitals for the past 72 hrs (Last 3 readings):   Weight   09/23/19 0437 107.3 kg (236 lb 10.6 oz)   09/22/19 0500 106.5 kg (234 lb 12.6 oz)   09/21/19 0500 106.6 kg (235 lb 0.2  oz)     Body mass index is 37.07 kg/m².      Intake/Output Summary (Last 24 hours) at 9/23/2019 1016  Last data filed at 9/23/2019 0600  Gross per 24 hour   Intake 180 ml   Output 1200 ml   Net -1020 ml        Telemetry: Aflutter.     Physical Exam   Constitutional: She is oriented to person, place, and time. She appears well-developed and well-nourished.   HENT:   Head: Normocephalic.   Eyes: Pupils are equal, round, and reactive to light.   Neck: Normal range of motion. Neck supple.   Cardiovascular: Normal rate and regular rhythm.   VAD hum   Pulmonary/Chest: Effort normal and breath sounds normal.   Abdominal: Soft. Bowel sounds are normal.   Musculoskeletal: Normal range of motion.   Neurological: She is alert and oriented to person, place, and time.   Skin: Skin is warm and dry.   Psychiatric: She has a normal mood and affect. Her behavior is normal.   Nursing note and vitals reviewed.    Significant Labs:  CBC:  Recent Labs   Lab 09/22/19  0418 09/22/19  1039 09/23/19  0458   WBC 12.29 13.43* 11.12  11.12   RBC 3.58* 3.66* 3.60*  3.60*   HGB 9.9* 10.1* 10.4*  10.4*   HCT 32.7* 33.5* 33.0*  33.0*   * 525* 563*  563*   MCV 91 92 92  92   MCH 27.7 27.6 28.9  28.9   MCHC 30.3* 30.1* 31.5*  31.5*     BNP:  Recent Labs   Lab 09/23/19  0458   *     CMP:  Recent Labs   Lab 09/21/19  0322 09/22/19  0417 09/23/19  0458   GLU 99 113* 117*   CALCIUM 9.3 9.2 9.3   ALBUMIN 2.7* 2.5* 2.7*   PROT 6.4 6.6 6.7   * 130* 135*   K 4.4 4.6 4.6   CO2 35* 29 32*   CL 89* 88* 88*   BUN 32* 32* 33*   CREATININE 1.4 1.3 1.6*   ALKPHOS 74 80 88   ALT 22 21 29   AST 17 29 42*   BILITOT 0.5 0.5 0.6      Coagulation:   Recent Labs   Lab 09/22/19  0417 09/22/19  1039 09/22/19  1633 09/23/19  0458   INR 1.7* 1.7*  --  1.5*   APTT 29.5 33.8* 40.8* 33.9*  33.9*     LDH:  Recent Labs   Lab 09/21/19  0322 09/22/19  0417 09/23/19  0458   * 432* 345*     Microbiology:  Microbiology Results (last 7 days)      Procedure Component Value Units Date/Time    HPV High Risk Genotypes, PCR [[id number]] Collected:  09/11/19 1330    Order Status:  Completed Updated:  09/16/19 1259     HPV High Risk type 16, PCR Negative     HPV High Risk type 18, PCR Negative     HPV other High Risk types, PCR Negative     Comment: Other HPV genotypes include:   31,33,35,39,45,51,52,56,58,59,66 and 68.         Narrative:       replaces order [id number]        I have reviewed all pertinent labs within the past 24 hours.    Estimated Creatinine Clearance: 53.6 mL/min (A) (based on SCr of 1.6 mg/dL (H)).    Diagnostic Results:  I have reviewed all pertinent imaging results/findings within the past 24 hours.    Assessment and Plan:     No notes on file    * LVAD (left ventricular assist device) present  -HeartMate 3 Implanted 9/10/19 as DT.  -HTS Primary.  -Implanted by Dr. Walden  -INR subtherapeutic. Goal INR 2.0-3.0.   Continue heparin and coumadin.  -Antiplatelets:  mg.  -LDH is stable overall today. Will continue to monitor daily.  -Speed set at 5200  -Not listed for OHTx.  - weaned off.   -Echo 9/18: LVIDD 5.69, TAPSE 0.86, AV does not open. The ventricular septum is at midline.  -If dizziness/lightheadeness continues, may need to consider RHC.  -Continue Pt/OT/VAD education.    Procedure: Device Interrogation Including analysis of device parameters  Current Settings: Ventricular Assist Device  Review of device function is stable/unstable stable    TXP LVAD INTERROGATIONS 9/23/2019 9/23/2019 9/23/2019 9/22/2019 9/22/2019 9/22/2019 9/22/2019   Type HeartMate3 HeartMate3 HeartMate3 HeartMate3 HeartMate3 HeartMate3 HeartMate3   Flow 3.6 3.8 3.4 3.6 3.3 3.5 3.7   Speed 5200 5200 5200 5200 5200 5200 5200   PI 4.3 3.5 3.1 3.3 3.6 4.6 4.1   Power ([place]rellana) 3.5 3.6 3.4 3.7 3.3 3.5 3.6   LSL 4800 4800 4800 4800 4800 4800 4800   Pulsatility No Pulse No Pulse No Pulse No Pulse No Pulse No Pulse No Pulse       Atrial flutter  -Continue  Amio/anticoagulation.   -EP Cx to consider DCCV.     Constipation  -Continue Senna/Colace/miralax.  -Continue prn milk of Mag today.     Pleural effusion  -Bilateral pleural effusions present, L>R.    CKD (chronic kidney disease)  -Creatinine at baseline  - Avoid nephrotoxic agents    Abnormal CT scan  -focal opacity found at right base.  DDx: atelectasis.  Recommending repeat CT in 3 months    Enlarged thyroid  Ultrasound done and showed enlarged multinodular thyroid which warrants surveillance in one year.   - TSH and fT4 wnl    History of ventricular tachycardia  -In the setting of hypokalemia  -Monitor electrolytes closely    Acute on chronic combined systolic and diastolic heart failure  NICM EF 20% s/p LVAD 9/10  RHC: done on admit 9/4/19 RA: 20/ 20/ 18 RV: 60/ 8/ 18 PA: 60/ 32/ 45 PWP: 43/ 70/ 40 . Cardiac output was 2.27 by Fitz. Cardiac index is 1.04 L/min/m2. O2 Sat: PA 34%. AO 95% PVR 2.2 PALMER    -see s/p LVAD      Ventricular tachyarrhythmia  - On amio at home, hold for now (see above)   - Goal K >4 and Mg >2  - Monitor on telemetry    ICD (implantable cardioverter-defibrillator) in place  -Medtronic CRT-D  -Patient began v-pacing at 60 and 9/14/19 began having phrenic nerve stimulation. Amiodarone held and EP consulted, device settings changed and PNS ceased.   -Underlying rhythm a flutter with slow ventricular response at 32 bpm      Americo Albert NP  Heart Transplant  [facility]

## 2019-09-23 NOTE — ASSESSMENT & PLAN NOTE
-HeartMate 3 Implanted 9/10/19 as DT.  -HTS Primary.  -Implanted by Dr. Walden  -INR subtherapeutic. Goal INR 2.0-3.0.   Continue heparin and coumadin.  -Antiplatelets:  mg.  -LDH is stable overall today. Will continue to monitor daily.  -Speed set at 5200  -Not listed for OHTx.  - weaned off.   -Echo 9/18: LVIDD 5.69, TAPSE 0.86, AV does not open. The ventricular septum is at midline.  -If dizziness/lightheadeness continues, may need to consider RHC.  -Continue Pt/OT/VAD education.    Procedure: Device Interrogation Including analysis of device parameters  Current Settings: Ventricular Assist Device  Review of device function is stable/unstable stable    TXP LVAD INTERROGATIONS 9/23/2019 9/23/2019 9/23/2019 9/22/2019 9/22/2019 9/22/2019 9/22/2019   Type HeartMate3 HeartMate3 HeartMate3 HeartMate3 HeartMate3 HeartMate3 HeartMate3   Flow 3.6 3.8 3.4 3.6 3.3 3.5 3.7   Speed 5200 5200 5200 5200 5200 5200 5200   PI 4.3 3.5 3.1 3.3 3.6 4.6 4.1   Power (Orellana) 3.5 3.6 3.4 3.7 3.3 3.5 3.6   LSL 4800 4800 4800 4800 4800 4800 4800   Pulsatility No Pulse No Pulse No Pulse No Pulse No Pulse No Pulse No Pulse

## 2019-09-23 NOTE — PROGRESS NOTES
[facility]  Cardiac Electrophysiology  Progress Note    Admission Date: 9/4/2019  Code Status: Full Code   Attending Physician: Jolene Lua MD   Expected Discharge Date: 9/27/2019  Principal Problem:LVAD (left ventricular assist device) present    Subjective:     Interval History: Called back due to concern for aflutter. States she is having dizziness that is prolonged going from sitting to standing with some associated nausea. Per patient, hx of afib in the past and was followed by Dr. Richard at [facility].     Review of Systems   Constitution: Negative for chills and fever.   Cardiovascular: Negative for chest pain, palpitations and syncope.   Respiratory: Negative for shortness of breath.    Gastrointestinal: Negative for nausea and vomiting.   Neurological: Positive for dizziness.     Objective:     Vital Signs (Most Recent):  Temp: 98.6 °F (37 °C) (09/23/19 1217)  Pulse: 60 (09/23/19 1100)  Resp: 18 (09/23/19 1217)  BP: (!) 86/0 (09/23/19 1221)  SpO2: (!) 91 % (09/23/19 1217) Vital Signs (24h Range):  Temp:  [97.7 °F (36.5 °C)-98.7 °F (37.1 °C)] 98.6 °F (37 °C)  Pulse:  [50-71] 60  Resp:  [15-18] 18  SpO2:  [91 %-100 %] 91 %  BP: (78-86)/(0) 86/0     Weight: 107.3 kg (236 lb 10.6 oz)  Body mass index is 37.07 kg/m².     SpO2: (!) 91 %  O2 Device (Oxygen Therapy): room air    Physical Exam   Constitutional: She is oriented to person, place, and time. She appears well-developed and well-nourished.   HENT:   Head: Normocephalic.   Eyes: Pupils are equal, round, and reactive to light.   Neck: Normal range of motion. Neck supple.   Cardiovascular: Normal rate and regular rhythm.   VAD hum   Pulmonary/Chest: Effort normal and breath sounds normal.   Abdominal: Soft. Bowel sounds are normal.   Musculoskeletal: Normal range of motion.   Neurological: She is alert and oriented to person, place, and time.   Skin: Skin is warm and dry.   Psychiatric: She has a normal mood and affect. Her  behavior is normal.   Nursing note and vitals reviewed.    Significant Labs:   EP:   Recent Labs   Lab 09/22/19  0417  09/22/19  0418 09/22/19  1039 09/23/19  0458   *  --   --   --  135*   K 4.6  --   --   --  4.6   CL 88*  --   --   --  88*   CO2 29  --   --   --  32*   *  --   --   --  117*   BUN 32*  --   --   --  33*   CREATININE 1.3  --   --   --  1.6*   CALCIUM 9.2  --   --   --  9.3   PROT 6.6  --   --   --  6.7   ALBUMIN 2.5*  --   --   --  2.7*   BILITOT 0.5  --   --   --  0.6   ALKPHOS 80  --   --   --  88   AST 29  --   --   --  42*   ALT 21  --   --   --  29   ANIONGAP 13  --   --   --  15   ESTGFRAFRICA 55.7*  --   --   --  43.3*   EGFRNONAA 48.3*  --   --   --  37.6*   WBC  --   --  12.29 13.43* 11.12  11.12   HGB  --   --  9.9* 10.1* 10.4*  10.4*   HCT  --    < > 32.7* 33.5* 33.0*  33.0*   PLT  --   --  421* 525* 563*  563*   INR 1.7*  --   --  1.7* 1.5*    < > = values in this interval not displayed.       Significant Imaging: telemetry- Underlying LVAD artifact. Possible aflutter     Assessment and Plan:     Atrial flutter  Previous history of afib per the patient +/- aflutter with noted baseline rhythm of aflutter on last device interrogation and symptomatic with lightheaded/dizziness Will plan for NADINE/DCCV tomorrow.      Recs:   - NPO @ midnight  - Will plan with NADINE given previous hx and uncertainty with AC hx           Christa Chen MD  Cardiac Electrophysiology  [facility]

## 2019-09-23 NOTE — PLAN OF CARE
Goals reviewed and remain appropriate. Pt progressing towards goals.    Alecia Pace, PT, DPT   2019  [phone]    Problem: Physical Therapy Goal  Goal: Physical Therapy Goal  Description  Goals to be met by: 10/1/2019    Patient will increase functional independence with mobility by performin. Supine to sit with Contact Guard Assistance - not met  2. Sit to stand transfer with Supervision - not met  3. Gait  x 200 feet with Supervision  - not met  4. Ascend/descend 2 stair with Contact Guard Assistance - not met     Outcome: Ongoing, Progressing

## 2019-09-23 NOTE — PT/OT/SLP PROGRESS
Physical Therapy Treatment    Patient Name:  Deborah Navas   MRN:  [id number]    Recommendations:     Discharge Recommendations:  home health PT   Discharge Equipment Recommendations: none   Barriers to discharge: Decreased caregiver support at current functional level    Assessment:     Deborah Navas is a 48 y.o. female admitted with a medical diagnosis of LVAD (left ventricular assist device) present.  She presents with the following impairments/functional limitations:  weakness, impaired self care skills, impaired balance, impaired endurance, impaired functional mobilty, gait instability, impaired cardiopulmonary response to activity. Pt progressing functional mobility, as she was able to increase total gait distance this date. However, pt continues to demo impaired endurance and generalized weakness, requiring intermittent seated rest breaks and limiting further gait distance. Pt demo'd mild gait instability throughout session, requiring CGA and unilateral UE support on IV pole throughout gait. Pt would continue to benefit from skilled acute PT in order to address current deficits and progress functional mobility.     Rehab Prognosis: Good; patient would benefit from acute skilled PT services to address these deficits and reach maximum level of function.    Recent Surgery: Procedure(s) (LRB):  INSERTION-LEFT VENTRICULAR ASSIST DEVICE (N/A)  INSERTION, GRAFT, PERICARDIUM  CLOSURE, WOUND, STERNUM 13 Days Post-Op    Plan:     During this hospitalization, patient to be seen 6 x/week to address the identified rehab impairments via gait training, therapeutic activities, therapeutic exercises, neuromuscular re-education and progress toward the following goals:    · Plan of Care Expires:  10/10/19    Subjective     Chief Complaint: nausea  Patient/Family Comments/goals: return home  Pain/Comfort:  · Pain Rating 1: 0/10      Objective:     Communicated with RN prior to session.  Patient found supine  with telemetry, LVAD, peripheral IV upon PT entry to room.     General Precautions: Standard, LVAD, fall, sternal   Orthopedic Precautions:N/A   Braces: N/A     Functional Mobility:  · Bed Mobility:     ? Supine to Sit: stand by assistance with HOB elevated  · Transfers:    ? Sit to Stand:  x1 rep with SBA and no AD from EOB; x3 reps with CGA and no AD  · Gait: 34 ft. + 56 ft. + 50 ft. with CGA and pt pushing IV pole  ? Chair follow throughout with seated rest breaks between gait trials. Returned to room via chair following.  ? Emergency bag present throughout  ? demo'd decreased jonathan, decreased step length, mild gait instability, and impaired weight-shifting ability   ? Cues provided for self-pacing and safety awareness       AM-PAC 6 CLICK MOBILITY  Turning over in bed (including adjusting bedclothes, sheets and blankets)?: 3  Sitting down on and standing up from a chair with arms (e.g., wheelchair, bedside commode, etc.): 3  Moving from lying on back to sitting on the side of the bed?: 3  Moving to and from a bed to a chair (including a wheelchair)?: 3  Need to walk in hospital room?: 3  Climbing 3-5 steps with a railing?: 1  Basic Mobility Total Score: 16       Therapeutic Activities and Exercises:   Pt found on wall power. Performed self-test with cues to do so. LVAD numbers already recorded in binder this AM. Switched from wall>battery power with set-up assist. Reviewed battery rotation. Organized and donned consolidation bag with assist for donning waist strap.  Completed functional mobility as described above. Cues for breathing technique provided throughout session. Reported nausea during session, but no episode of emesis. RN notified of pt request for meds.     Patient left up in chair with all lines intact, call button in reach and pt's friend present..    GOALS:   Multidisciplinary Problems     Physical Therapy Goals        Problem: Physical Therapy Goal    Goal Priority Disciplines Outcome Goal  Variances Interventions   Physical Therapy Goal     PT, PT/OT Ongoing, Progressing     Description:  Goals to be met by: 10/1/2019    Patient will increase functional independence with mobility by performin. Supine to sit with Contact Guard Assistance - not met  2. Sit to stand transfer with Supervision - not met  3. Gait  x 200 feet with Supervision  - not met  4. Ascend/descend 2 stair with Contact Guard Assistance - not met                      Time Tracking:     PT Received On: 19  PT Start Time: 946     PT Stop Time: 1013  PT Total Time (min): 27 min     Billable Minutes: Gait Training 17 and Therapeutic Activity 10    Treatment Type: Treatment  PT/PTA: PT     PTA Visit Number: 0     Alecia Pace, PT, DPT   2019  [phone]

## 2019-09-23 NOTE — PROGRESS NOTES
09/23/2019  Christo Cooper    Current provider:  Jolene Lua MD      I, Christo Cooper, rounded on Deborah Navas to ensure all mechanical assist device settings (IABP or VAD) were appropriate and all parameters were within limits.  I was able to ensure all back up equipment was present, the staff had no issues, and the Perfusion Department daily rounding was complete.    12:16 PM

## 2019-09-23 NOTE — PLAN OF CARE
Problem: Adult Inpatient Plan of Care  Goal: Plan of Care Review  9/23/2019 1131 by Hubert Mccarthy, RN  Outcome: Ongoing, Progressing  9/23/2019 1029 by Hubert Mccarthy, RN  Outcome: Ongoing, Progressing     Problem: Adult Inpatient Plan of Care  Goal: Patient-Specific Goal (Individualization)  Description  Hx: Dilated cardiomyopathy and heart failure with reduced EF 20%; ICD implantation    9/4: Admit for decompensated HF medically managed    9/10: HM3 placement, closed, extubated  9/11: Weaning epi/nitric, 1500cc albumin    Nursing:  MAP 60-80  Daily Labs   Outcome: Ongoing, Progressing    LVAD coordinator continuing education today as well as RN completing dressing change education with caregiver. Heparin GTT continues, INR 1.5. Hm3 with H/H of 10.4/33.0. No complaints of pain, SOB and no falls. VSS. Very concerned over not having BM for 2 days, milk of magnesia given. Will continue to monitor.

## 2019-09-23 NOTE — PROGRESS NOTES
Update:    SW to pt's room to assess needs and provide emotional support. Pt and caregiver aaox4, communicative, and pleasant. Pt sitting up in chair and reading LVAD book. Pt and caregiver report feeling comfortable with education. Pt's caregiver reports she has been working on LVAD dressing change and is feeling comfortable with it. Pt and caregiver report coping adequately at this time, although having difficulty with stress of hospital stay at times. SW providing emotional support and counseling. Pt and caregiver report no other needs at this time. SW providing ongoing psychosocial and counseling support, education, assistance, resources, and discharge planning as indicated. SW continuing to follow and remains available.

## 2019-09-23 NOTE — PROGRESS NOTES
Update Note    SWI to see pt for update. Pt presented as aaox4 with pleasant affect. Pt's friend and caregiver Flavia was also in attendance. Pt's caregiver Flavia reports she plans to return home today to get back to work once she is checked off on pt's dressing change. Pt reports hopes to be checked off on alarms today. Pt reports her mother is coming to stay on Thursday. Pt reports coping well and denies further needs, questions, concerns at this time and none indicated. Providing ongoing psychosocial and counseling support, education, resources, assistance and discharge planning as indicated. Following and available.

## 2019-09-23 NOTE — PROGRESS NOTES
Patient AAO with caregiver at bedside. Spent 100 mins educating patient.       Educational HM 3 DVD provided by company has been reviewed by patient.  VAD binder reviewed with patient including what to document and the meanings of all the VAD parameters (Speed, Flow, PI and Power). Additionally, it was pointed out to the patient where to find the proper phone numbers to call each of the individual VAD coordinators. We spoke about when to page the VAD coordinator vs when to call during normal business hours. We discussed possible medication the patient may encounter with a VAD were discussed including not changing prescriptions without talking to their VAD team. Explained that the pharmacist will further discuss before discharge and create them a purple card. Patient was able to successfully page the VAD coordinator and point out the proper places to find the Coordinator on calls phone number/emergency number. Patient was able to explain what the VAD is and how it works. Patient was able to successfully explained that if hurtado are greater then 10w they will page the VAD coordinator. Proper severe weather plans were discussed including proper generator usage and creating an evacuation plan. Patient explains the are not to use the mobile power unit if it is plugged into a generator. Patient was able to successfully change controller and verbalize to page VAD coordinator prior to initiating controller change. Patient understands the locking mechanism for the driveline, sleep mode, and how to jump start the controller as needed. Patient was able to successfully explain the items that must be at bedside at all times including flashlight, batteries, and clips. Patient was able to verbalize understanding that he must sleep on the module power unit and not batteries. Patient was able to verbalize the alarms and all their meanings for the mobile power unit. Patient slept at least one night on the mobile power unit while  staying in the hospital. We discussed the battery charger. Patient was able to explain that battery lifespan, charge length, and how to rotate batteries. Patient was able to explain how to calibrate batteries and when the proper time to do so. Patient was able to explain how to tell when the pump is running and what to do if it is not. Patient was able to verbalize  alarms hazard vs advisory alarms.    Patient is checked off on alarms per VAD coordinator.

## 2019-09-23 NOTE — SUBJECTIVE & OBJECTIVE
Interval History: Feeling much better this morning. Working with PT/OT in [place]. No BM for past 2 days.      heparin (porcine) in D5W 13 Units/kg/hr (09/23/19 0759)     Scheduled Meds:   aspirin  325 mg Oral Daily    gabapentin  400 mg Oral BID    mirtazapine  15 mg Oral QHS    oxyCODONE  20 mg Oral Q12H    pantoprazole  40 mg Oral Daily    polyethylene glycol  17 g Oral Daily    ramelteon  8 mg Oral QHS    senna-docusate 8.6-50 mg  2 tablet Oral BID    simethicone  1 tablet Oral QID (PC + HS)    venlafaxine  150 mg Oral Daily    warfarin  5 mg Oral Daily     PRN Meds:albuterol sulfate, bisacodyl, glycerin 99.5% **AND** magnesium citrate **AND** sodium chloride 0.9%, hydrocortisone, hydrOXYzine HCl, magnesium hydroxide 400 mg/5 ml, magnesium sulfate IVPB, ondansetron, oxyCODONE, oxyCODONE-acetaminophen, promethazine (PHENERGAN) IVPB, simethicone, sodium chloride 0.9%    Review of patient's allergies indicates:   Allergen Reactions    Adhesive Blisters     Reaction to area in chest and up only    Codeine Itching     Objective:     Vital Signs (Most Recent):  Temp: 98.7 °F (37.1 °C) (09/23/19 0806)  Pulse: 71 (09/23/19 0806)  Resp: 18 (09/23/19 0806)  BP: (!) 78/0 (09/23/19 0806)  SpO2: 96 % (09/23/19 0806) Vital Signs (24h Range):  Temp:  [97.7 °F (36.5 °C)-98.7 °F (37.1 °C)] 98.7 °F (37.1 °C)  Pulse:  [50-96] 71  Resp:  [15-18] 18  SpO2:  [94 %-100 %] 96 %  BP: (78-86)/(0) 78/0     Patient Vitals for the past 72 hrs (Last 3 readings):   Weight   09/23/19 0437 107.3 kg (236 lb 10.6 oz)   09/22/19 0500 106.5 kg (234 lb 12.6 oz)   09/21/19 0500 106.6 kg (235 lb 0.2 oz)     Body mass index is 37.07 kg/m².      Intake/Output Summary (Last 24 hours) at 9/23/2019 1016  Last data filed at 9/23/2019 0600  Gross per 24 hour   Intake 180 ml   Output 1200 ml   Net -1020 ml        Telemetry: Aflutter.     Physical Exam   Constitutional: She is oriented to person, place, and time. She appears well-developed and  well-nourished.   HENT:   Head: Normocephalic.   Eyes: Pupils are equal, round, and reactive to light.   Neck: Normal range of motion. Neck supple.   Cardiovascular: Normal rate and regular rhythm.   VAD hum   Pulmonary/Chest: Effort normal and breath sounds normal.   Abdominal: Soft. Bowel sounds are normal.   Musculoskeletal: Normal range of motion.   Neurological: She is alert and oriented to person, place, and time.   Skin: Skin is warm and dry.   Psychiatric: She has a normal mood and affect. Her behavior is normal.   Nursing note and vitals reviewed.    Significant Labs:  CBC:  Recent Labs   Lab 09/22/19 0418 09/22/19 1039 09/23/19  0458   WBC 12.29 13.43* 11.12  11.12   RBC 3.58* 3.66* 3.60*  3.60*   HGB 9.9* 10.1* 10.4*  10.4*   HCT 32.7* 33.5* 33.0*  33.0*   * 525* 563*  563*   MCV 91 92 92  92   MCH 27.7 27.6 28.9  28.9   MCHC 30.3* 30.1* 31.5*  31.5*     BNP:  Recent Labs   Lab 09/23/19 0458   *     CMP:  Recent Labs   Lab 09/21/19 0322 09/22/19 0417 09/23/19 0458   GLU 99 113* 117*   CALCIUM 9.3 9.2 9.3   ALBUMIN 2.7* 2.5* 2.7*   PROT 6.4 6.6 6.7   * 130* 135*   K 4.4 4.6 4.6   CO2 35* 29 32*   CL 89* 88* 88*   BUN 32* 32* 33*   CREATININE 1.4 1.3 1.6*   ALKPHOS 74 80 88   ALT 22 21 29   AST 17 29 42*   BILITOT 0.5 0.5 0.6      Coagulation:   Recent Labs   Lab 09/22/19 0417 09/22/19 1039 09/22/19  1633 09/23/19 0458   INR 1.7* 1.7*  --  1.5*   APTT 29.5 33.8* 40.8* 33.9*  33.9*     LDH:  Recent Labs   Lab 09/21/19 0322 09/22/19 0417 09/23/19  0458   * 432* 345*     Microbiology:  Microbiology Results (last 7 days)     Procedure Component Value Units Date/Time    HPV High Risk Genotypes, PCR [[id number]] Collected:  09/11/19 1330    Order Status:  Completed Updated:  09/16/19 1259     HPV High Risk type 16, PCR Negative     HPV High Risk type 18, PCR Negative     HPV other High Risk types, PCR Negative     Comment: Other HPV genotypes include:    31,33,35,39,45,51,52,56,58,59,66 and 68.         Narrative:       replaces order [id number]        I have reviewed all pertinent labs within the past 24 hours.    Estimated Creatinine Clearance: 53.6 mL/min (A) (based on SCr of 1.6 mg/dL (H)).    Diagnostic Results:  I have reviewed all pertinent imaging results/findings within the past 24 hours.

## 2019-09-24 ENCOUNTER — ANESTHESIA (OUTPATIENT)
Dept: MEDSURG UNIT | Facility: HOSPITAL | Age: 49
DRG: 001 | End: 2019-09-24
Payer: COMMERCIAL

## 2019-09-24 ENCOUNTER — ANESTHESIA EVENT (OUTPATIENT)
Dept: MEDSURG UNIT | Facility: HOSPITAL | Age: 49
DRG: 001 | End: 2019-09-24
Payer: COMMERCIAL

## 2019-09-24 PROBLEM — I47.20 VENTRICULAR TACHYARRHYTHMIA: Status: RESOLVED | Noted: 2018-07-20 | Resolved: 2019-09-24

## 2019-09-24 LAB
ALBUMIN SERPL BCP-MCNC: 2.6 G/DL (ref 3.5–5.2)
ALP SERPL-CCNC: 84 U/L (ref 55–135)
ALT SERPL W/O P-5'-P-CCNC: 24 U/L (ref 10–44)
ANION GAP SERPL CALC-SCNC: 11 MMOL/L (ref 8–16)
APTT BLDCRRT: 39.6 SEC (ref 21–32)
APTT BLDCRRT: 39.6 SEC (ref 21–32)
AST SERPL-CCNC: 30 U/L (ref 10–40)
BILIRUB SERPL-MCNC: 0.5 MG/DL (ref 0.1–1)
BUN SERPL-MCNC: 31 MG/DL (ref 6–20)
CALCIUM SERPL-MCNC: 9 MG/DL (ref 8.7–10.5)
CHLORIDE SERPL-SCNC: 88 MMOL/L (ref 95–110)
CO2 SERPL-SCNC: 33 MMOL/L (ref 23–29)
CREAT SERPL-MCNC: 1.6 MG/DL (ref 0.5–1.4)
EST. GFR  (AFRICAN AMERICAN): 43.3 ML/MIN/1.73 M^2
EST. GFR  (NON AFRICAN AMERICAN): 37.6 ML/MIN/1.73 M^2
GLUCOSE SERPL-MCNC: 108 MG/DL (ref 70–110)
INR PPP: 2 (ref 0.8–1.2)
LDH SERPL L TO P-CCNC: 307 U/L (ref 110–260)
MAGNESIUM SERPL-MCNC: 2.8 MG/DL (ref 1.6–2.6)
POTASSIUM SERPL-SCNC: 4.5 MMOL/L (ref 3.5–5.1)
PROT SERPL-MCNC: 6.7 G/DL (ref 6–8.4)
PROTHROMBIN TIME: 19.5 SEC (ref 9–12.5)
SODIUM SERPL-SCNC: 132 MMOL/L (ref 136–145)

## 2019-09-24 PROCEDURE — 93750 PR INTERROGATE VENT ASSIST DEV, IN PERSON, W PHYSICIAN ANALYSIS: ICD-10-PCS | Mod: NTX,,, | Performed by: INTERNAL MEDICINE

## 2019-09-24 PROCEDURE — 97530 THERAPEUTIC ACTIVITIES: CPT | Mod: NTX

## 2019-09-24 PROCEDURE — D9220A PRA ANESTHESIA: Mod: CRNA,NTX,, | Performed by: NURSE ANESTHETIST, CERTIFIED REGISTERED

## 2019-09-24 PROCEDURE — 80053 COMPREHEN METABOLIC PANEL: CPT | Mod: NTX

## 2019-09-24 PROCEDURE — 25000003 PHARM REV CODE 250: Mod: NTX | Performed by: INTERNAL MEDICINE

## 2019-09-24 PROCEDURE — 99232 SBSQ HOSP IP/OBS MODERATE 35: CPT | Mod: NTX,,, | Performed by: INTERNAL MEDICINE

## 2019-09-24 PROCEDURE — 93799 UNLISTED CV SVC/PROCEDURE: CPT | Mod: 26,NTX,, | Performed by: INTERNAL MEDICINE

## 2019-09-24 PROCEDURE — 37000009 HC ANESTHESIA EA ADD 15 MINS: Performed by: ANESTHESIOLOGY

## 2019-09-24 PROCEDURE — 93750 INTERROGATION VAD IN PERSON: CPT | Mod: NTX,,, | Performed by: INTERNAL MEDICINE

## 2019-09-24 PROCEDURE — 25000003 PHARM REV CODE 250: Mod: NTX | Performed by: NURSE ANESTHETIST, CERTIFIED REGISTERED

## 2019-09-24 PROCEDURE — 99233 PR SUBSEQUENT HOSPITAL CARE,LEVL III: ICD-10-PCS | Mod: NTX,,, | Performed by: INTERNAL MEDICINE

## 2019-09-24 PROCEDURE — 36415 COLL VENOUS BLD VENIPUNCTURE: CPT | Mod: NTX

## 2019-09-24 PROCEDURE — 93799: ICD-10-PCS | Mod: 26,NTX,, | Performed by: INTERNAL MEDICINE

## 2019-09-24 PROCEDURE — 83735 ASSAY OF MAGNESIUM: CPT | Mod: NTX

## 2019-09-24 PROCEDURE — 63600175 PHARM REV CODE 636 W HCPCS: Mod: NTX | Performed by: NURSE ANESTHETIST, CERTIFIED REGISTERED

## 2019-09-24 PROCEDURE — D9220A PRA ANESTHESIA: Mod: ANES,NTX,, | Performed by: ANESTHESIOLOGY

## 2019-09-24 PROCEDURE — 93799 UNLISTED CV SVC/PROCEDURE: CPT | Mod: NTX | Performed by: INTERNAL MEDICINE

## 2019-09-24 PROCEDURE — 99233 SBSQ HOSP IP/OBS HIGH 50: CPT | Mod: NTX,,, | Performed by: INTERNAL MEDICINE

## 2019-09-24 PROCEDURE — 63600175 PHARM REV CODE 636 W HCPCS: Mod: NTX | Performed by: STUDENT IN AN ORGANIZED HEALTH CARE EDUCATION/TRAINING PROGRAM

## 2019-09-24 PROCEDURE — 27000248 HC VAD-ADDITIONAL DAY: Mod: NTX

## 2019-09-24 PROCEDURE — 83615 LACTATE (LD) (LDH) ENZYME: CPT | Mod: NTX

## 2019-09-24 PROCEDURE — 25000003 PHARM REV CODE 250: Mod: NTX | Performed by: NURSE PRACTITIONER

## 2019-09-24 PROCEDURE — 37000008 HC ANESTHESIA 1ST 15 MINUTES: Mod: NTX | Performed by: INTERNAL MEDICINE

## 2019-09-24 PROCEDURE — 97803 MED NUTRITION INDIV SUBSEQ: CPT | Mod: NTX

## 2019-09-24 PROCEDURE — D9220A PRA ANESTHESIA: ICD-10-PCS | Mod: CRNA,NTX,, | Performed by: NURSE ANESTHETIST, CERTIFIED REGISTERED

## 2019-09-24 PROCEDURE — 20600001 HC STEP DOWN PRIVATE ROOM: Mod: NTX

## 2019-09-24 PROCEDURE — D9220A PRA ANESTHESIA: ICD-10-PCS | Mod: ANES,NTX,, | Performed by: ANESTHESIOLOGY

## 2019-09-24 PROCEDURE — 37000009 HC ANESTHESIA EA ADD 15 MINS: Mod: NTX | Performed by: INTERNAL MEDICINE

## 2019-09-24 PROCEDURE — 37000008 HC ANESTHESIA 1ST 15 MINUTES: Performed by: ANESTHESIOLOGY

## 2019-09-24 PROCEDURE — 99232 PR SUBSEQUENT HOSPITAL CARE,LEVL II: ICD-10-PCS | Mod: NTX,,, | Performed by: INTERNAL MEDICINE

## 2019-09-24 PROCEDURE — 85730 THROMBOPLASTIN TIME PARTIAL: CPT | Mod: NTX

## 2019-09-24 PROCEDURE — 85610 PROTHROMBIN TIME: CPT | Mod: NTX

## 2019-09-24 PROCEDURE — 97535 SELF CARE MNGMENT TRAINING: CPT | Mod: NTX

## 2019-09-24 RX ORDER — WARFARIN 3 MG/1
3 TABLET ORAL DAILY
Status: DISCONTINUED | OUTPATIENT
Start: 2019-09-24 | End: 2019-09-26

## 2019-09-24 RX ORDER — LACTULOSE 10 G/15ML
20 SOLUTION ORAL 3 TIMES DAILY PRN
Status: DISCONTINUED | OUTPATIENT
Start: 2019-09-24 | End: 2019-10-01 | Stop reason: HOSPADM

## 2019-09-24 RX ORDER — PROPOFOL 10 MG/ML
VIAL (ML) INTRAVENOUS CONTINUOUS PRN
Status: DISCONTINUED | OUTPATIENT
Start: 2019-09-24 | End: 2019-09-24

## 2019-09-24 RX ORDER — PHENYLEPHRINE HYDROCHLORIDE 10 MG/ML
INJECTION INTRAVENOUS
Status: DISCONTINUED | OUTPATIENT
Start: 2019-09-24 | End: 2019-09-24

## 2019-09-24 RX ORDER — ACETAMINOPHEN 10 MG/ML
1000 INJECTION, SOLUTION INTRAVENOUS EVERY 8 HOURS
Status: DISPENSED | OUTPATIENT
Start: 2019-09-24 | End: 2019-09-25

## 2019-09-24 RX ORDER — ETOMIDATE 2 MG/ML
INJECTION INTRAVENOUS
Status: DISCONTINUED | OUTPATIENT
Start: 2019-09-24 | End: 2019-09-24

## 2019-09-24 RX ORDER — LIDOCAINE HYDROCHLORIDE 20 MG/ML
SOLUTION OROPHARYNGEAL
Status: DISCONTINUED | OUTPATIENT
Start: 2019-09-24 | End: 2019-09-24

## 2019-09-24 RX ORDER — ACETAMINOPHEN 500 MG
1000 TABLET ORAL EVERY 8 HOURS
Status: DISCONTINUED | OUTPATIENT
Start: 2019-09-25 | End: 2019-09-28

## 2019-09-24 RX ORDER — SODIUM CHLORIDE 0.9 % (FLUSH) 0.9 %
10 SYRINGE (ML) INJECTION
Status: DISCONTINUED | OUTPATIENT
Start: 2019-09-24 | End: 2019-09-26

## 2019-09-24 RX ADMIN — RAMELTEON 8 MG: 8 TABLET ORAL at 09:09

## 2019-09-24 RX ADMIN — PHENYLEPHRINE HYDROCHLORIDE 100 MCG: 10 INJECTION INTRAVENOUS at 04:09

## 2019-09-24 RX ADMIN — SODIUM CHLORIDE, SODIUM GLUCONATE, SODIUM ACETATE, POTASSIUM CHLORIDE, MAGNESIUM CHLORIDE, SODIUM PHOSPHATE, DIBASIC, AND POTASSIUM PHOSPHATE: .53; .5; .37; .037; .03; .012; .00082 INJECTION, SOLUTION INTRAVENOUS at 04:09

## 2019-09-24 RX ADMIN — SENNOSIDES AND DOCUSATE SODIUM 2 TABLET: 8.6; 5 TABLET ORAL at 09:09

## 2019-09-24 RX ADMIN — SIMETHICONE CHEW TAB 80 MG 80 MG: 80 TABLET ORAL at 09:09

## 2019-09-24 RX ADMIN — PHENYLEPHRINE HYDROCHLORIDE 50 MCG: 10 INJECTION INTRAVENOUS at 04:09

## 2019-09-24 RX ADMIN — OXYCODONE HYDROCHLORIDE 20 MG: 20 TABLET, FILM COATED, EXTENDED RELEASE ORAL at 09:09

## 2019-09-24 RX ADMIN — WARFARIN SODIUM 3 MG: 3 TABLET ORAL at 06:09

## 2019-09-24 RX ADMIN — ACETAMINOPHEN 1000 MG: 10 INJECTION, SOLUTION INTRAVENOUS at 09:09

## 2019-09-24 RX ADMIN — LIDOCAINE HYDROCHLORIDE 1 ML: 20 SOLUTION OROPHARYNGEAL at 04:09

## 2019-09-24 RX ADMIN — PROPOFOL 100 MCG/KG/MIN: 10 INJECTION, EMULSION INTRAVENOUS at 04:09

## 2019-09-24 RX ADMIN — GABAPENTIN 400 MG: 100 CAPSULE ORAL at 09:09

## 2019-09-24 RX ADMIN — SIMETHICONE CHEW TAB 80 MG 80 MG: 80 TABLET ORAL at 06:09

## 2019-09-24 RX ADMIN — MIRTAZAPINE 15 MG: 15 TABLET, FILM COATED ORAL at 09:09

## 2019-09-24 RX ADMIN — FUROSEMIDE 40 MG: 40 TABLET ORAL at 06:09

## 2019-09-24 RX ADMIN — ETOMIDATE 4 MG: 2 INJECTION, SOLUTION INTRAVENOUS at 04:09

## 2019-09-24 RX ADMIN — LACTULOSE 20 G: 20 SOLUTION ORAL at 06:09

## 2019-09-24 NOTE — ASSESSMENT & PLAN NOTE
-HeartMate 3 Implanted 9/10/19 as DT.  -HTS Primary.  -Implanted by Dr. Walden  -INR therapeutic. Goal INR 2.0-3.0. Continue coumadin.  -Antiplatelets:  mg.  -LDH is stable overall today. Will continue to monitor daily.  -Speed set at 5200  -Not listed for OHTx.  - weaned off.   -Echo 9/18: LVIDD 5.69, TAPSE 0.86, AV does not open. The ventricular septum is at midline. Will repeat tomorrow.  -Continue Pt/OT/VAD education.    Procedure: Device Interrogation Including analysis of device parameters  Current Settings: Ventricular Assist Device  Review of device function is stable/unstable stable    TXP LVAD INTERROGATIONS 9/24/2019 9/24/2019 9/23/2019 9/23/2019 9/23/2019 9/23/2019 9/23/2019   Type HeartMate3 HeartMate3 HeartMate3 HeartMate3 HeartMate3 HeartMate3 HeartMate3   Flow 3.5 3.8 4.1 3.8 3.6 3.4 3.6   Speed 5200 5200 5200 5250 5200 5200 5200   PI 4.0 4.0 3.4 3.8 3.5 5.2 4.3   Power (Orellana) 3.5 3.5 3.6 3.6 3.6 3.7 3.5   LSL 4800 - - - 4800 4800 4800   Pulsatility No Pulse No Pulse No Pulse No Pulse No Pulse No Pulse No Pulse

## 2019-09-24 NOTE — HPI
This is a 49yo woman here for pre-DCCV NADINE. Has a history of NICM EF 20%, s/p HM3 @ 5200rpm earlier this month, who has been in atrial flutter. Additional history of CKD3, hypothyroidism, VT. Currently denies symptoms of angina, heart failure, dysphagia, no contra-indications to NADINE.    Dysphagia or odynophagia:  No  Liver Disease, esophageal disease, or known varices:  No  Upper GI Bleeding: No  Snoring:  Yes  Sleep Apnea:  No  Prior neck surgery or radiation:  No  History of anesthetic difficulties:  No  Family history of anesthetic difficulties:  No  Last oral intake:  12 hours ago  Able to move neck in all directions:  Yes  Anticoagulation/Antiplatelets: Coumadin    Lab Results   Component Value Date     (H) 09/23/2019     (H) 09/23/2019     Lab Results   Component Value Date    HGB 10.4 (L) 09/23/2019    HGB 10.4 (L) 09/23/2019     Lab Results   Component Value Date    INR 2.0 (H) 09/24/2019    INR 1.5 (H) 09/23/2019    INR 1.7 (H) 09/22/2019       TT ECHO:  Transthoracic echo (TTE) complete (Cupid Only):   Results for orders placed or performed during the hospital encounter of 09/04/19   Echo Color Flow Doppler? Yes; Bubble Contrast? No   Result Value Ref Range    Ascending aorta 3.38 cm    STJ 2.94 cm    IVS 0.77 0.6 - 1.1 cm    LA size 2.77 cm    Left Atrium Major Axis 5.30 cm    Left Atrium Minor Axis 4.99 cm    LVIDD 5.69 3.5 - 6.0 cm    LVIDS 5.01 (A) 2.1 - 4.0 cm    LVOT diameter 2.04 cm    PW 0.79 0.6 - 1.1 cm    MV Peak A Scott 0.44 m/s    E wave decelartion time 148.13 msec    MV Peak E Scott 0.73 m/s    RA Major Axis 4.95 cm    RA Width 3.35 cm    RVDD 4.60 cm    Sinus 3.02 cm    TAPSE 0.86 cm    TR Max Scott 1.98 m/s    TDI LATERAL 0.05 m/s    LA WIDTH 3.37 cm    LV Diastolic Volume 159.42 mL    LV Systolic Volume 118.88 mL    LV LATERAL E/E' RATIO 14.60 m/s    FS 12 %    LA volume 40.79 cm3    LV mass 164.39 g    Left Ventricle Relative Wall Thickness 0.28 cm    E/A ratio 1.66     LVOT area  3.3 cm2    LV Systolic Volume Index 55.1 mL/m2    LV Diastolic Volume Index 73.89 mL/m2    LA Volume Index 18.9 mL/m2    LV Mass Index 76 g/m2    Triscuspid Valve Regurgitation Peak Gradient 16 mmHg    BSA 2.24 m2    Right Atrial Pressure (from IVC) 15 mmHg    TV rest pulmonary artery pressure 31 mmHg    Narrative    · LVAD present. Base speed is 5200. The pump type is a Heartmate III. The   aortic valve does not appear to open but very challenging to see. LVEDD of   5.7 cm. ventricular septum midline  · Moderate to severe tricuspid regurgitation- little pressure difference   between RV and RA  · Severely decreased left ventricular systolic function. The estimated   ejection fraction is 15%  · Mild-to-moderate mitral regurgitation.  · Grade I (mild) left ventricular diastolic dysfunction consistent with   impaired relaxation. Normal left atrial pressure.  · Moderate right ventricular enlargement. Moderately reduced right   ventricular systolic function.  · Elevated central venous pressure (15 mm Hg).  · The estimated PA systolic pressure is 31 mm Hg- may not be an accurate   reflection of pulmonary pressures     Underlying atrial fibrillation possible.         NADINE/EGD: None on file

## 2019-09-24 NOTE — SUBJECTIVE & OBJECTIVE
Past Medical History:   Diagnosis Date    Fractures     History of ventricular fibrillation 9/4/2019    History of ventricular tachycardia 9/4/2019    Hyperlipidemia     Migraine     Osteoarthritis        Past Surgical History:   Procedure Laterality Date    BACK SURGERY      2007    BONE GRAFT Left     from Left hip to Left FA    eardrum reconstruction  1980    ELBOW SURGERY Left [id number]    FOREARM FRACTURE SURGERY Bilateral [id number]    multiple surgeries    INSERTION OF GRAFT TO PERICARDIUM  9/10/2019    Procedure: INSERTION, GRAFT, PERICARDIUM;  Surgeon: Shar Walden MD;  Location: [facility] OR [address] [place] [address];  Service: Cardiovascular;;    INSERTION OF IMPLANTABLE CARDIOVERTER-DEFIBRILLATOR (ICD) GENERATOR WITH TWO EXISTING LEADS      INSERTION OF PACEMAKER Left     LEFT VENTRICULAR ASSIST DEVICE N/A 9/10/2019    Procedure: INSERTION-LEFT VENTRICULAR ASSIST DEVICE;  Surgeon: Shar Walden MD;  Location: [facility] OR [address] [place] [address];  Service: Cardiovascular;  Laterality: N/A;  DT HM3     LUMBAR FUSION  2007    L4-L5    RIGHT HEART CATHETERIZATION Right 9/4/2019    Procedure: INSERTION, CATHETER, RIGHT HEART;  Surgeon: Vi Bryant MD;  Location: [facility] CATH LAB;  Service: Cardiology;  Laterality: Right;    SINUS SURGERY Right 1994    with lymph nodes    STERNAL WOUND CLOSURE  9/10/2019    Procedure: CLOSURE, WOUND, STERNUM;  Surgeon: Shar Walden MD;  Location: [facility] OR [address] [place] [address];  Service: Cardiovascular;;    TEMPOROMANDIBULAR JOINT SURGERY Right 1988    TONSILLECTOMY      TYMPANOSTOMY TUBE PLACEMENT  1971- 1979    multiple tube placements       Review of patient's allergies indicates:   Allergen Reactions    Adhesive Blisters     Reaction to area in chest and up only    Codeine Itching       No current facility-administered medications on file prior to encounter.      Current Outpatient Medications on File Prior to Encounter   Medication Sig    amiodarone (PACERONE) 200 MG Tab Take 200 mg by mouth 2  (two) times daily.     aspirin (ECOTRIN) 81 MG EC tablet Take 81 mg by mouth.    atorvastatin (LIPITOR) 40 MG tablet Take 40 mg by mouth once daily.    cpm-phenyleph-acetaminophen (NOREL AD) 4- mg Tab Take by mouth 2 (two) times daily.    docusate sodium (COLACE) 100 MG capsule Take 100 mg by mouth once daily.    furosemide (LASIX) 40 MG tablet Take 40 mg by mouth.    gabapentin (NEURONTIN) 800 MG tablet Take 400 mg by mouth 3 (three) times daily.     loratadine (CLARITIN) 10 mg tablet Take 10 mg by mouth daily as needed.    oxycodone-acetaminophen (PERCOCET) 5-325 mg per tablet Take 1 tablet by mouth every 4 (four) hours as needed for Pain.    potassium chloride (K-TAB) 20 mEq Take 2 tablets by mouth.    promethazine (PHENERGAN) 25 MG tablet Take 25 mg by mouth every 6 (six) hours as needed for Nausea.    spironolactone (ALDACTONE) 25 MG tablet Take 25 mg by mouth.    VENLAFAXINE HCL (EFFEXOR ORAL) Take 150 mg by mouth once daily.    ZOLPIDEM TARTRATE (AMBIEN ORAL) Take 12.5 mg by mouth nightly as needed.    metOLazone (ZAROXOLYN) 5 MG tablet Take 2.5 mg by mouth daily as needed.     Family History     Problem Relation (Age of Onset)    Broken bones Maternal Grandmother    Cancer Paternal Grandmother    Dislocations Maternal Grandmother    Heart failure Paternal Grandfather, Maternal Grandfather    Migraines Mother, Maternal Grandmother, Paternal Grandmother, Paternal Grandfather, Maternal Grandfather    Obesity Paternal Grandmother    Osteoarthritis Mother, Maternal Grandmother, Paternal Grandmother, Paternal Grandfather, Maternal Grandfather, Father    Osteoporosis Maternal Grandmother    Scoliosis Maternal Grandmother    Stroke Father        Tobacco Use    Smoking status: Never Smoker    Smokeless tobacco: Never Used   Substance and Sexual Activity    Alcohol use: No    Drug use: No    Sexual activity: Not Currently     Review of Systems   Constitution: Negative for chills, fever and  weight gain.   HENT: Negative for congestion.    Eyes: Negative for visual disturbance.   Cardiovascular: Negative for chest pain, claudication, dyspnea on exertion, leg swelling, orthopnea, palpitations and syncope.   Respiratory: Positive for shortness of breath. Negative for cough and snoring.    Hematologic/Lymphatic: Does not bruise/bleed easily.   Skin: Negative for rash.   Musculoskeletal: Negative for muscle cramps and myalgias.   Gastrointestinal: Negative for bloating, abdominal pain, constipation, diarrhea and melena.   Genitourinary: Negative for bladder incontinence.   Neurological: Negative for excessive daytime sleepiness, focal weakness and weakness.   Psychiatric/Behavioral: Negative for depression and suicidal ideas.     Objective:     Vital Signs (Most Recent):  Temp: 98.2 °F (36.8 °C) (09/24/19 0800)  Pulse: 78 (09/24/19 0800)  Resp: 18 (09/24/19 0800)  BP: (!) 78/0 (09/24/19 0800)  SpO2: 98 % (09/24/19 0409) Vital Signs (24h Range):  Temp:  [97.6 °F (36.4 °C)-98.6 °F (37 °C)] 98.2 °F (36.8 °C)  Pulse:  [] 78  Resp:  [16-18] 18  SpO2:  [91 %-98 %] 98 %  BP: (76-88)/(0) 78/0     Weight: 108 kg (238 lb 1.6 oz)  Body mass index is 37.29 kg/m².    SpO2: 98 %  O2 Device (Oxygen Therapy): room air      Intake/Output Summary (Last 24 hours) at 9/24/2019 0918  Last data filed at 9/24/2019 0600  Gross per 24 hour   Intake 1031.25 ml   Output 1350 ml   Net -318.75 ml       Lines/Drains/Airways     Line                 VAD 09/10/19 1322 Left ventricular assist device HeartMate 3 13 days          Peripheral Intravenous Line                 Peripheral IV - Single Lumen 09/20/19 2151 20 G Left Antecubital 3 days                Physical Exam   Constitutional: She is oriented to person, place, and time. She appears well-developed and well-nourished. No distress.   Obese   HENT:   Head: Normocephalic and atraumatic.   Mouth/Throat: Oropharynx is clear and moist.   Eyes: Pupils are equal, round, and reactive  to light. Conjunctivae and EOM are normal. No scleral icterus.   Neck: Normal range of motion. Neck supple. No JVD present.   Cardiovascular: Normal rate, regular rhythm, normal heart sounds and intact distal pulses.   No murmur heard.  Pulses:       Radial pulses are 2+ on the right side, and 2+ on the left side.   LVAD hum   Pulmonary/Chest: Effort normal and breath sounds normal. No respiratory distress.   Symmetrical expansion   Abdominal: Soft. Bowel sounds are normal. There is no hepatosplenomegaly. There is no tenderness.   Musculoskeletal: Normal range of motion. She exhibits no edema.   Neurological: She is alert and oriented to person, place, and time. No cranial nerve deficit.   Skin: Skin is warm and dry. No rash noted. She is not diaphoretic.   Psychiatric: She has a normal mood and affect. Judgment and thought content normal.

## 2019-09-24 NOTE — SUBJECTIVE & OBJECTIVE
Interval History: Remains in aflutter this morning. No issues overnight. Ambulated overnight without weakness or dizziness.    Review of Systems   All other systems reviewed and are negative.    Objective:     Vital Signs (Most Recent):  Temp: 97.6 °F (36.4 °C) (09/24/19 0409)  Pulse: 100 (09/24/19 0409)  Resp: 16 (09/24/19 0409)  BP: (!) 78/0(unable to obtain cuff pressure) (09/24/19 0409)  SpO2: 98 % (09/24/19 0409) Vital Signs (24h Range):  Temp:  [97.6 °F (36.4 °C)-98.7 °F (37.1 °C)] 97.6 °F (36.4 °C)  Pulse:  [] 100  Resp:  [16-18] 16  SpO2:  [91 %-98 %] 98 %  BP: (76-88)/(0) 78/0     Weight: 108 kg (238 lb 1.6 oz)  Body mass index is 37.29 kg/m².     SpO2: 98 %  O2 Device (Oxygen Therapy): room air    Physical Exam   Constitutional: She is oriented to person, place, and time. She appears well-developed and well-nourished.   HENT:   Head: Normocephalic.   Eyes: Pupils are equal, round, and reactive to light.   Neck: Normal range of motion. Neck supple.   Cardiovascular: Normal rate and regular rhythm.   VAD hum   Pulmonary/Chest: Effort normal and breath sounds normal.   Abdominal: Soft. Bowel sounds are normal.   Musculoskeletal: Normal range of motion.   Neurological: She is alert and oriented to person, place, and time.   Skin: Skin is warm and dry.     Significant Labs: All pertinent lab results from the last 24 hours have been reviewed.    Significant Imaging: Telemetry reviewed

## 2019-09-24 NOTE — PLAN OF CARE
Pt free of falls/trauma/injuries.  Denies c/o SOB; O2Sats remain stable on room air..  Incentive spirometry encouraged throughout shift.  Incisional pain managed with PO analgesics.  Generalized skin remains CDI; 2+  edema noted to BLEs.  TEDs applied / in place.  Incisions remain FARRAH, CDI.  Pt being diuresed with  PO lasix_; diuresing well.   Wt remains stable. .  Electrolytes replaced as ordered.  PT/OT following; pt able to ambulate in hallway with 1-person assist. Pt will have a cardio conversion this afternoon. Request that she be able to sleep until they come get her. Refused to take medications this am until she can eat this afternoon. Wants to wait to do VAD dressing until she comes back from procedure.   Plan to continue with post-op care.  Pt tolerating plan of care.

## 2019-09-24 NOTE — NURSING TRANSFER
Nursing Transfer Note      9/24/2019     Transfer to EP lab from [facility]    Transfer via bed    Transfer with cardiac monitoring    Transported by EP lab    Medicines sent: no    Chart send with patient: Yes    Notified: friend    Patient reassessed at:    Upon arrival to floor:

## 2019-09-24 NOTE — ASSESSMENT & PLAN NOTE
Previous history of afib per the patient +/- aflutter with noted baseline rhythm of aflutter on last device interrogation and symptomatic with lightheaded/dizziness Will plan for NADINE/DCCV today.      Recs:   - INR 2 this morning  - NADINE/DCCV later today

## 2019-09-24 NOTE — PLAN OF CARE
[facility]   Heart Transplant/VAD Clinic   [address] [place] [address]   [phone] after hours          [phone] fax     VAD HOME  HEALTH ORDERS    Admit to Home Health    Diagnosis:   Patient Active Problem List   Diagnosis    Knee pain    Pes anserine bursitis    ICD (implantable cardioverter-defibrillator) in place    Ventricular fibrillation    Acute on chronic combined systolic and diastolic heart failure    Congestive cardiomyopathy    History of ventricular fibrillation    History of ventricular tachycardia    Heart transplant candidate    Enlarged thyroid    Abnormal CT scan    CKD (chronic kidney disease)    CHF (congestive heart failure)    Chronic systolic congestive heart failure    Systolic HF (heart failure)    Palliative care encounter    Goals of care, counseling/discussion    LVAD (left ventricular assist device) present    Encounter for weaning from ventilator    Pleural effusion    Bradycardia    Anticoagulation monitoring, INR range 2-3    Constipation    Orthostatic hypotension    Atrial flutter     Patient is homebound due to:  NYHA Class IV HF. S/P LVAD placement.     Diet: Low Fat, Low cholesterol, 2Gm Na, Coumadin restrictions.    Acitivities: No Swimming, bathing, vacuuming, contact sports.    Fresh implants= Sternal Precautions    Nursing:   SN to complete comprehensive assessment including routine vital signs. Instruct on disease process and s/s of complications to report to MD. Review/verify medication list sent home with the patient at time of discharge  and instruct patient/caregiver as needed. Frequency may be adjusted depending on start of care date.    **LVAD driveline exit site dressing change is to be completed per LVAD patient/caregiver only**.    Notify MD if:  SBP > 120 or < 80;   MAP > 80 or < 65;   HR > 120 or < 60;   Temp > 101;   Weight gain >3lbs in 1 day or 5lbs in 1 week.    LABS:   SN to perform labs: PT/INR per Coumadin clinic [phone].   Follow up INR date: 9/30/19  No Finger Sticks    CONSULTS:    Physical Therapy to evaluate and treat. Evaluate for home safety and equipment needs; Establish/upgrade home exercise program. Perform / instruct on therapeutic exercises, gait training, transfer training, and Range of Motion.    Occupational Therapy to evaluate and treat. Evaluate home environment for safety and equipment needs. Perform/Instruct on transfers, ADL training, ROM, and therapeutic exercises.    Send initial Home Health orders to [facility] attending physician on call.  Send follow up questions to VAD clinic MD [phone] or [phone].

## 2019-09-24 NOTE — ANESTHESIA PREPROCEDURE EVALUATION
09/24/2019  Deborah Navas is a 48 y.o., female presenting for NADINE/DCCV.    Past Medical History:   Diagnosis Date    Fractures     History of ventricular fibrillation 9/4/2019    History of ventricular tachycardia 9/4/2019    Hyperlipidemia     Migraine     Osteoarthritis      Past Surgical History:   Procedure Laterality Date    BACK SURGERY      2007    BONE GRAFT Left     from Left hip to Left FA    eardrum reconstruction  1980    ELBOW SURGERY Left 0629-0821    FOREARM FRACTURE SURGERY Bilateral [id number]    multiple surgeries    INSERTION OF GRAFT TO PERICARDIUM  9/10/2019    Procedure: INSERTION, GRAFT, PERICARDIUM;  Surgeon: Shar Walden MD;  Location: [facility] OR [address] [place] [address];  Service: Cardiovascular;;    INSERTION OF IMPLANTABLE CARDIOVERTER-DEFIBRILLATOR (ICD) GENERATOR WITH TWO EXISTING LEADS      INSERTION OF PACEMAKER Left     LEFT VENTRICULAR ASSIST DEVICE N/A 9/10/2019    Procedure: INSERTION-LEFT VENTRICULAR ASSIST DEVICE;  Surgeon: Shar Walden MD;  Location: [facility] OR [address] [place] [address];  Service: Cardiovascular;  Laterality: N/A;  DT HM3     LUMBAR FUSION  2007    L4-L5    RIGHT HEART CATHETERIZATION Right 9/4/2019    Procedure: INSERTION, CATHETER, RIGHT HEART;  Surgeon: Vi Bryant MD;  Location: [facility] CATH LAB;  Service: Cardiology;  Laterality: Right;    SINUS SURGERY Right 1994    with lymph nodes    STERNAL WOUND CLOSURE  9/10/2019    Procedure: CLOSURE, WOUND, STERNUM;  Surgeon: Shar Walden MD;  Location: [facility] OR [address] [place] [address];  Service: Cardiovascular;;    TEMPOROMANDIBULAR JOINT SURGERY Right 1988    TONSILLECTOMY      TYMPANOSTOMY TUBE PLACEMENT  1971- 1979    multiple tube placements     Review of patient's allergies indicates:   Allergen Reactions    Adhesive Blisters     Reaction to area in chest and up only    Codeine Itching     No current  facility-administered medications on file prior to encounter.      Current Outpatient Medications on File Prior to Encounter   Medication Sig Dispense Refill    amiodarone (PACERONE) 200 MG Tab Take 200 mg by mouth 2 (two) times daily.       aspirin (ECOTRIN) 81 MG EC tablet Take 81 mg by mouth.      atorvastatin (LIPITOR) 40 MG tablet Take 40 mg by mouth once daily.  3    cpm-phenyleph-acetaminophen (NOREL AD) 4- mg Tab Take by mouth 2 (two) times daily.      docusate sodium (COLACE) 100 MG capsule Take 100 mg by mouth once daily.      furosemide (LASIX) 40 MG tablet Take 40 mg by mouth.      gabapentin (NEURONTIN) 800 MG tablet Take 400 mg by mouth 3 (three) times daily.       loratadine (CLARITIN) 10 mg tablet Take 10 mg by mouth daily as needed.      oxycodone-acetaminophen (PERCOCET) 5-325 mg per tablet Take 1 tablet by mouth every 4 (four) hours as needed for Pain.      potassium chloride (K-TAB) 20 mEq Take 2 tablets by mouth.      promethazine (PHENERGAN) 25 MG tablet Take 25 mg by mouth every 6 (six) hours as needed for Nausea.      spironolactone (ALDACTONE) 25 MG tablet Take 25 mg by mouth.      VENLAFAXINE HCL (EFFEXOR ORAL) Take 150 mg by mouth once daily.      ZOLPIDEM TARTRATE (AMBIEN ORAL) Take 12.5 mg by mouth nightly as needed.      metOLazone (ZAROXOLYN) 5 MG tablet Take 2.5 mg by mouth daily as needed.       Lab Results   Component Value Date    WBC 11.12 09/23/2019    WBC 11.12 09/23/2019    HGB 10.4 (L) 09/23/2019    HGB 10.4 (L) 09/23/2019    HCT 33.0 (L) 09/23/2019    HCT 33.0 (L) 09/23/2019    MCV 92 09/23/2019    MCV 92 09/23/2019     (H) 09/23/2019     (H) 09/23/2019     BMP  Lab Results   Component Value Date     (L) 09/24/2019    K 4.5 09/24/2019    CL 88 (L) 09/24/2019    CO2 33 (H) 09/24/2019    BUN 31 (H) 09/24/2019    CREATININE 1.6 (H) 09/24/2019    CALCIUM 9.0 09/24/2019    ANIONGAP 11 09/24/2019    ESTGFRAFRICA 43.3 (A) 09/24/2019     EGFRNONAA 37.6 (A) 09/24/2019         Anesthesia Evaluation    I have reviewed the Patient Summary Reports.     I have reviewed the Medications.     Review of Systems  Anesthesia Hx:  No problems with previous Anesthesia   Denies Personal Hx of Anesthesia complications.   Cardiovascular:   Dysrhythmias CHF ECG has been reviewed.    Pulmonary:   Sleep Apnea    Renal/:   Chronic Renal Disease, CRI    Neurological:   Denies CVA. Denies Seizures.        Physical Exam  General:  Morbid Obesity    Airway/Jaw/Neck:  Airway Findings: Mouth Opening: Small, but > 3cm Tongue: Large  General Airway Assessment: Adult  Mallampati: III  TM Distance: 4 - 6 cm  Jaw/Neck Findings:  Neck ROM: Normal ROM     Eyes/Ears/Nose:  Eyes/Ears/Nose Findings:    Dental:  Dental Findings: In tact        Mental Status:  Mental Status Findings:  Cooperative, Alert and Oriented         Anesthesia Plan  Type of Anesthesia, risks & benefits discussed:  Anesthesia Type:  general  Patient's Preference: natural airway  Intra-op Monitoring Plan: standard ASA monitors  Intra-op Monitoring Plan Comments:   Post Op Pain Control Plan: per primary service following discharge from PACU and IV/PO Opioids PRN  Post Op Pain Control Plan Comments:   Induction:   IV  Beta Blocker:  Patient is not currently on a Beta-Blocker (No further documentation required).       Informed Consent: Patient understands risks and agrees with Anesthesia plan.  Questions answered. Anesthesia consent signed with patient.  ASA Score: 4     Day of Surgery Review of History & Physical:            Ready For Surgery From Anesthesia Perspective.

## 2019-09-24 NOTE — NURSING TRANSFER
Nursing Transfer Note      9/24/2019     Transfer To: [facility] #[id number]    Transfer via bed    Transfer with cardiac monitoring    Transported by RN, PCT    Medicines sent: n/a    Chart send with patient: Yes    Notified: Patient refused    Patient reassessed at: 9/24/2019

## 2019-09-24 NOTE — PLAN OF CARE
Recommendations     Recommendation:   1. As medically appropriate, advance as tolerated to bland diet. Encourage PO intake.   2. Recommend changing Boost Plus to Optisource TID (pt prefers strawberry). Optisource does not contain vitamin K.   3. RD to monitor & follow up.     Goals: Pt to receive nutrition by RD follow up  Nutrition Goal Status: new  Communication of RD Recs: other (comment)(POC)

## 2019-09-24 NOTE — PT/OT/SLP PROGRESS
Occupational Therapy   Treatment    Name: Deborah Navas  MRN: [id number]  Admitting Diagnosis:  LVAD (left ventricular assist device) present  14 Days Post-Op    Recommendations:     Discharge Recommendations: home with home health  Discharge Equipment Recommendations:  none      Assessment:     Deborah Navas is a 48 y.o. female with a medical diagnosis of LVAD (left ventricular assist device) present.  . Performance deficits affecting function are weakness, impaired self care skills, impaired balance, impaired functional mobilty, impaired endurance, gait instability. Pt progressing well. OT adjusted POC to reflect current performance.   LVAD flow remained stable throughout session. Pt without dizziness but did have SOB following ADL routine.     Rehab Prognosis:  Good; patient would benefit from acute skilled OT services to address these deficits and reach maximum level of function.       Plan:     Patient to be seen 4 x/week to address the above listed problems via self-care/home management, therapeutic activities  · Plan of Care Expires: 10/11/19  · Plan of Care Reviewed with: patient    Subjective     Pain/Comfort:  · Pain Rating 1: 0/10    Objective:     Communicated with: supine in bed with nsg present and LVAD to wall power.     General Precautions: Standard, fall, LVAD, sternal   Orthopedic Precautions:N/A     Occupational Performance:     Bed Mobility:    Supine<>sit with independence.     Functional Mobility/Transfers:  · Sit>stand with SBA     Activities of Daily Living:  · UE dressing: set-up tyrel/doff pull over shirt  · LE dressing: SBA tyrel/doff underwear  · Bathing: SBA sponge bath  · Feeding: independent       [facility] [address] 6 Click ADL: 19    Treatment & Education:  Pt performed self test and documented numbers in binder independently. Pt verb understanding of battery rotation. Pt independent transition the LVAD from wall power to battery power and placed items in shoulder consolidation  bag.  Pt able to verb purpose and contents of emergency bag.    Pt with SOB following ADL routine and returned supine to rest. Pt scheduled for EP lab later this date so she undressed as she needs to be in gown when transport arrives.    Education provided re: OT POC and safety with functional mobility/ADL skills.     Patient left supine with all lines intact, call button in reach and nsg notifiedEducation:      GOALS:   Multidisciplinary Problems     Occupational Therapy Goals        Problem: Occupational Therapy Goal    Goal Priority Disciplines Outcome Interventions   Occupational Therapy Goal     OT, PT/OT Ongoing, Progressing    Description:  Goals to be met by: 10/11/19     Patient will increase functional independence with ADLs by performing:    UE Dressing with Irion.  LE Dressing with Irion.  Grooming while standing at sink with Irion.  Toileting from toilet with Irion for hygiene and clothing management.   Bathing from  shower chair/bench with Irion.  Toilet transfer to toilet with Irion.  Increased functional strength to WFL for B UE.  Upper extremity exercise program x15 reps per handout, with independence.                       Time Tracking:     OT Date of Treatment: 09/24/19  OT Start Time: 0845  OT Stop Time: 0915  OT Total Time (min): 30 min    Billable Minutes:Self Care/Home Management 30    JAGJIT Madison  9/24/2019

## 2019-09-24 NOTE — PROGRESS NOTES
09/24/2019  Terra Porter    Current provider:  Jolene Lua MD      I, Terra Porter, rounded on Deborah Navas to ensure all mechanical assist device settings (IABP or VAD) were appropriate and all parameters were within limits.  I was able to ensure all back up equipment was present, the staff had no issues, and the Perfusion Department daily rounding was complete.    9:42 AM

## 2019-09-24 NOTE — CONSULTS
"  [facility]  Adult Nutrition  Consult Note    SUMMARY     Recommendations    Recommendation:   1. As medically appropriate, advance as tolerated to bland diet. Encourage PO intake.   2. Recommend changing Boost Plus to Optisource TID (pt prefers strawberry). Optisource does not contain vitamin K.   3. RD to monitor & follow up.    Goals: Pt to receive nutrition by RD follow up  Nutrition Goal Status: new  Communication of RD Recs: other (comment)(POC)    Reason for Assessment    Reason For Assessment: consult, RD follow-up  Diagnosis: surgery/postoperative complications(s/p LVAD 9/10)  Relevant Medical History: NICM, HLD  Interdisciplinary Rounds: attended  General Information Comments: Pt NPO for NADINE at time of visit today. Pt reports poor appetite 2/2 nausea but states she tries to eat as much as she can. Pt reports eating ~50% of meals + drinking Boost. Wt has been stable ~230-240 lbs with fluid changes. Pt continues to appear nourished.  Nutrition Discharge Planning: Adequate nutrition via PO intake.    Nutrition Risk Screen    Nutrition Risk Screen: no indicators present    Nutrition/Diet History    Patient Reported Diet/Restrictions/Preferences: low salt  Spiritual, Cultural Beliefs, Faith Practices, Values that Affect Care: no  Factors Affecting Nutritional Intake: decreased appetite, nausea/vomiting, abdominal pain    Anthropometrics    Temp: 97.2 °F (36.2 °C)  Height Method: Stated  Height: 5' 7" (170.2 cm)  Height (inches): 67 in  Weight Method: Standard Scale  Weight: 108 kg (238 lb 1.6 oz)  Weight (lb): 238.1 lb  Ideal Body Weight (IBW), Female: 135 lb  % Ideal Body Weight, Female (lb): 172.59 lb  BMI (Calculated): 36.6  BMI Grade: 35 - 39.9 - obesity - grade II  Usual Body Weight (UBW), k.5 kg  % Usual Body Weight: 99.15    Lab/Procedures/Meds    Pertinent Labs Reviewed: reviewed  Pertinent Labs Comments: Na 132, BUN 31, Cr 1.6, GFR 37.6, Mg 2.8, Alb 2.6  Pertinent " Medications Reviewed: reviewed  Pertinent Medications Comments: lasix, lactulose, mirtazapine, pantoprazole, warfarin    Estimated/Assessed Needs    Weight Used For Calorie Calculations: 108 kg (238 lb 1.6 oz)  Energy Calorie Requirements (kcal): 2172 kcal/day  Energy Need Method: [place]-St Jeor(x PAL 1.25)  Protein Requirements: 108 g/day(1 g/kg)  Weight Used For Protein Calculations: 108 kg (238 lb 1.6 oz)  Fluid Requirements (mL): 1 mL/kcal or per MD  Estimated Fluid Requirement Method: RDA Method  RDA Method (mL): 2172    Nutrition Prescription Ordered    Current Diet Order: NPO  Nutrition Order Comments: -  Oral Nutrition Supplement: Boost Plus TID    Evaluation of Received Nutrient/Fluid Intake    I/O: -12.7L since admit  Comments: LBM 9/19  Tolerance: tolerating  % Intake of Estimated Energy Needs: 0 - 25 %  % Meal Intake: NPO    Nutrition Risk    Level of Risk/Frequency of Follow-up: (1x/week)     Assessment and Plan    Nutrition Problem  Increased nutrient needs     Related to (etiology):   Physiological causes related to healing     Signs and Symptoms (as evidenced by):   S/p LVAD placement 9/10      Interventions (treatment strategy):  Collaboration of nutrition care with other providers     Nutrition Diagnosis Status:   Continues    Monitor and Evaluation    Food and Nutrient Intake: energy intake, food and beverage intake  Food and Nutrient Adminstration: diet order  Knowledge/Beliefs/Attitudes: food and nutrition knowledge/skill  Physical Activity and Function: nutrition-related ADLs and IADLs  Anthropometric Measurements: weight, weight change  Biochemical Data, Medical Tests and Procedures: electrolyte and renal panel, gastrointestinal profile, inflammatory profile  Nutrition-Focused Physical Findings: overall appearance     Malnutrition Assessment  Pt does not meet criteria for malnutrition at this time.    Nutrition Follow-Up    RD Follow-up?: Yes

## 2019-09-24 NOTE — TRANSFER OF CARE
"Anesthesia Transfer of Care Note    Patient: Deborah Navas    Procedure(s) Performed: Procedure(s) (LRB):  CARDIOVERSION (N/A)  ECHOCARDIOGRAM,TRANSESOPHAGEAL (N/A)    Patient location: PACU    Anesthesia Type: MAC    Transport from OR: Transported from OR on room air with adequate spontaneous ventilation    Post pain: adequate analgesia    Post assessment: no apparent anesthetic complications and tolerated procedure well    Post vital signs: stable    Level of consciousness: awake and alert    Nausea/Vomiting: no nausea/vomiting    Complications: none    Transfer of care protocol was followed      Last vitals:   Visit Vitals  /74 (BP Location: Right arm, Patient Position: Lying)   Pulse 88   Temp 36.7 °C (98 °F) (Oral)   Resp 17   Ht 5' 7" (1.702 m)   Wt 108 kg (238 lb 1.6 oz)   LMP  (Within Years)   SpO2 99%   Breastfeeding? No   BMI 37.29 kg/m²     "

## 2019-09-24 NOTE — PLAN OF CARE
Goals reviewed and remain appropriate. Pt progressing towards goals.    Alecia Pace, PT, DPT   2019  [phone]      Problem: Physical Therapy Goal  Goal: Physical Therapy Goal  Description  Goals to be met by: 10/1/2019    Patient will increase functional independence with mobility by performin. Supine to sit with Contact Guard Assistance with HOB flat - not met  2. Sit to stand transfer with Supervision - not met  3. Gait  x 200 feet with Supervision  - not met  4. Ascend/descend 2 stair with Contact Guard Assistance - not met        Outcome: Ongoing, Progressing

## 2019-09-24 NOTE — ASSESSMENT & PLAN NOTE
-Medtronic CRT-D.  -Patient began v-pacing at 59 and 9/14/19 began having phrenic nerve stimulation. Amiodarone held and EP consulted, device settings changed and PNS ceased.

## 2019-09-24 NOTE — PLAN OF CARE
Patient is warm and comfortable as verbalized-denies pain/ponv. Vital signs are stable and within normal limit.

## 2019-09-24 NOTE — PROGRESS NOTES
D/C PLANNING NOTE    Per HTS rounds today, pt may be ready for d/c by end of this week.  Per report from BENIGNO Cornell, pt has requested [facility] for HH.  BENIGNO spoke with Gregory in intake with [facility] (ph: [phone], f: [phone]).  Gregory reports he will have to check on whether they can accept pt with LVAD.  BENIGNO advised that LVAD nurse coords. will send HH info about pt's pump, but that HH will not be responsible for any VAD care as pt & caregiver/family will be fully trained prior to d/c.  BENIGNO faxed HH orders & pt's records to [facility] for review via [employer], per Gregory's request.  BENIGNO informed LVAD coords. of potential HH agency.  Per Gregory, he should have an answer by tomorrow as to whether they can accept pt.  BENIGNO will f/u with [facility] re: admissions decision.  BENIGNO following and remains available.        UPDATE, 9/25 -- BENIGNO received call from Gregory with [facility] today confirming that they can accept pt for HH with LVAD.  BENIGNO informed VAD coordinators.

## 2019-09-24 NOTE — SUBJECTIVE & OBJECTIVE
Interval History: Had a good night. Excited that she got checked off on VAD education. No complaints this morning except no BM yesterday.      Scheduled Meds:   aspirin  325 mg Oral Daily    furosemide  40 mg Oral BID    gabapentin  400 mg Oral BID    mirtazapine  15 mg Oral QHS    oxyCODONE  20 mg Oral Q12H    pantoprazole  40 mg Oral Daily    polyethylene glycol  17 g Oral Daily    ramelteon  8 mg Oral QHS    senna-docusate 8.6-50 mg  2 tablet Oral BID    simethicone  1 tablet Oral QID (PC + HS)    venlafaxine  150 mg Oral Daily    warfarin  5 mg Oral Daily     PRN Meds:albuterol sulfate, bisacodyl, glycerin 99.5% **AND** magnesium citrate **AND** sodium chloride 0.9%, hydrocortisone, hydrOXYzine HCl, magnesium hydroxide 400 mg/5 ml, magnesium sulfate IVPB, ondansetron, oxyCODONE, oxyCODONE-acetaminophen, promethazine (PHENERGAN) IVPB, simethicone, sodium chloride 0.9%    Review of patient's allergies indicates:   Allergen Reactions    Adhesive Blisters     Reaction to area in chest and up only    Codeine Itching     Objective:     Vital Signs (Most Recent):  Temp: 98.2 °F (36.8 °C) (09/24/19 0800)  Pulse: 78 (09/24/19 0800)  Resp: 18 (09/24/19 0800)  BP: (!) 78/0 (09/24/19 0800)  SpO2: 98 % (09/24/19 0409) Vital Signs (24h Range):  Temp:  [97.6 °F (36.4 °C)-98.6 °F (37 °C)] 98.2 °F (36.8 °C)  Pulse:  [] 78  Resp:  [16-18] 18  SpO2:  [91 %-98 %] 98 %  BP: (76-88)/(0) 78/0     Patient Vitals for the past 72 hrs (Last 3 readings):   Weight   09/24/19 0500 108 kg (238 lb 1.6 oz)   09/23/19 0437 107.3 kg (236 lb 10.6 oz)   09/22/19 0500 106.5 kg (234 lb 12.6 oz)     Body mass index is 37.29 kg/m².      Intake/Output Summary (Last 24 hours) at 9/24/2019 0831  Last data filed at 9/24/2019 0600  Gross per 24 hour   Intake 1031.25 ml   Output 1350 ml   Net -318.75 ml        Telemetry: Aflutter.     Physical Exam   Constitutional: She is oriented to person, place, and time. She appears well-developed  and well-nourished.   HENT:   Head: Normocephalic.   Eyes: Pupils are equal, round, and reactive to light.   Neck: Normal range of motion. Neck supple.   Cardiovascular: Normal rate and regular rhythm.   VAD hum   Pulmonary/Chest: Effort normal and breath sounds normal.   Abdominal: Soft. Bowel sounds are normal.   Musculoskeletal: Normal range of motion.   Neurological: She is alert and oriented to person, place, and time.   Skin: Skin is warm and dry.   Psychiatric: She has a normal mood and affect. Her behavior is normal.   Nursing note and vitals reviewed.    Significant Labs:  CBC:  Recent Labs   Lab 09/22/19 0418 09/22/19  1039 09/23/19  0458   WBC 12.29 13.43* 11.12  11.12   RBC 3.58* 3.66* 3.60*  3.60*   HGB 9.9* 10.1* 10.4*  10.4*   HCT 32.7* 33.5* 33.0*  33.0*   * 525* 563*  563*   MCV 91 92 92  92   MCH 27.7 27.6 28.9  28.9   MCHC 30.3* 30.1* 31.5*  31.5*     BNP:  Recent Labs   Lab 09/23/19 0458   *     CMP:  Recent Labs   Lab 09/22/19 0417 09/23/19 0458 09/24/19  0358   * 117* 108   CALCIUM 9.2 9.3 9.0   ALBUMIN 2.5* 2.7* 2.6*   PROT 6.6 6.7 6.7   * 135* 132*   K 4.6 4.6 4.5   CO2 29 32* 33*   CL 88* 88* 88*   BUN 32* 33* 31*   CREATININE 1.3 1.6* 1.6*   ALKPHOS 80 88 84   ALT 21 29 24   AST 29 42* 30   BILITOT 0.5 0.6 0.5      Coagulation:   Recent Labs   Lab 09/22/19  1039 09/23/19 0458 09/23/19  1353 09/24/19  0358   INR 1.7*  --  1.5*  --  2.0*   APTT 33.8*   < > 33.9*  33.9* 45.6* 39.6*  39.6*    < > = values in this interval not displayed.     LDH:  Recent Labs   Lab 09/22/19  0417 09/23/19  0458 09/24/19  0358   * 345* 307*     Microbiology:  Microbiology Results (last 7 days)     ** No results found for the last 168 hours. **        I have reviewed all pertinent labs within the past 24 hours.    Estimated Creatinine Clearance: 53.8 mL/min (A) (based on SCr of 1.6 mg/dL (H)).    Diagnostic Results:  I have reviewed all pertinent imaging  results/findings within the past 24 hours.

## 2019-09-24 NOTE — PT/OT/SLP PROGRESS
Physical Therapy Treatment    Patient Name:  Deborah Navas   MRN:  [id number]    Recommendations:     Discharge Recommendations:  home health PT   Discharge Equipment Recommendations: none   Barriers to discharge: Decreased caregiver support at current functional level    Assessment:     Deborah Navas is a 48 y.o. female admitted with a medical diagnosis of LVAD (left ventricular assist device) present.  She presents with the following impairments/functional limitations:  weakness, impaired functional mobilty, impaired balance, impaired cardiopulmonary response to activity, impaired endurance, gait instability. Pt progressing functional mobility this date. However, pt still demonstrating impaired gait stability, decreased endurance and generalized weakness, requiring multiple seated rest breaks for recovery. Pt required CGA and unilateral HHA to help maintain stability throughout gait. Pt would continue to benefit from skilled PT services in order to improve gait stability and endurance.    Rehab Prognosis: Good; patient would benefit from acute skilled PT services to address these deficits and reach maximum level of function.    Recent Surgery: Procedure(s) (LRB):  INSERTION-LEFT VENTRICULAR ASSIST DEVICE (N/A)  INSERTION, GRAFT, PERICARDIUM  CLOSURE, WOUND, STERNUM 14 Days Post-Op    Plan:     During this hospitalization, patient to be seen 6 x/week to address the identified rehab impairments via gait training, neuromuscular re-education, therapeutic activities, therapeutic exercises and progress toward the following goals:    · Plan of Care Expires:  10/10/19    Subjective     Chief Complaint: none  Patient/Family Comments/goals: Pt stated she was tired from dressing with OT but wanted to see how far she could walk with PT today.  Pain/Comfort:  · Pain Rating 1: 0/10      Objective:     Communicated with RN prior to session.  Patient found supine and HOB elevated with telemetry, LVAD upon PT  entry to room.    General Precautions: Standard, NPO, fall, LVAD, sternal   Orthopedic Precautions:N/A   Braces: N/A     Functional Mobility:  · Bed Mobility: HOB flat  · Rolling Right: contact guard assistance  · Scooting: supervision for scooting forward seated EOB without use of hands   · Supine to Sit: min A  · Assisted in bringing shoulders up to sitting  · Transfers:     · Sit to Stand:  stand by assistance with no AD  · From EOB x2 trials  · From bedside chair x3 trials  · Gait: 64 ft + 56 ft+ 50 ft+ 72 ft with CGA with HHA for stability and safety  · Chair followed throughout with seated rest breaks between trials  · Cueing provided for self-pacing, breathing and safety  · Decreased step length, wide FIDE, impaired weight shifting, decrease foot to floor clearance, decreased jonathan  · Emergency bag present      AM-PAC 6 CLICK MOBILITY  Turning over in bed (including adjusting bedclothes, sheets and blankets)?: 3  Sitting down on and standing up from a chair with arms (e.g., wheelchair, bedside commode, etc.): 3  Moving from lying on back to sitting on the side of the bed?: 3(HOB flat)  Moving to and from a bed to a chair (including a wheelchair)?: 3  Need to walk in hospital room?: 3  Climbing 3-5 steps with a railing?: 1  Basic Mobility Total Score: 16       Therapeutic Activities and Exercises:  Pt found on battery power upon entry to room.  Pt educated on goals for therapy session. Pt verbalized agreement. Pt donned consolidation bag prior to mobility. Pt completed functional mobility as stated above.  Pt brushed teeth seated EOB unsupported.  Cues were given for self-pacing, sequencing of transfers, breathing and safety. Reported nausea during ambulation but no episode of emesis. RN notified of pt request for nausea meds.    Patient left seated on toliet with all lines intact, call button in reach, RN notified and educated on calling the nurse when finished with using the restroom. Pt verbalized  understanding. RN and Nursing students notified about pt left restroom. RN verbalized agreement to assist pt back to bed when pt finished.    GOALS:   Multidisciplinary Problems     Physical Therapy Goals        Problem: Physical Therapy Goal    Goal Priority Disciplines Outcome Goal Variances Interventions   Physical Therapy Goal     PT, PT/OT Ongoing, Progressing     Description:  Goals to be met by: 10/1/2019    Patient will increase functional independence with mobility by performin. Supine to sit with Contact Guard Assistance with HOB flat - not met  2. Sit to stand transfer with Supervision - not met  3. Gait  x 200 feet with Supervision  - not met  4. Ascend/descend 2 stair with Contact Guard Assistance - not met                         Time Tracking:     PT Received On: 19  PT Start Time: 933     PT Stop Time: 1005  PT Total Time (min): 32 min     Billable Minutes: Therapeutic Activity 32    Treatment Type: Treatment  PT/PTA: PT     PTA Visit Number: 0     Claudia Clark, [facility]  2019

## 2019-09-24 NOTE — PLAN OF CARE
Patient cooperative and pleasant with routine care and procedures.  Ambulates with stand by assist with steady gait and no c/o weakness or dizziness.  Fall precautions reviewed with patient and understanding verbalized.  NPO for NADINE/cardioversion in am.  Patient instructed and understanding verbalized.  Resting quietly without complaints.  Will continue to monitor.

## 2019-09-24 NOTE — ASSESSMENT & PLAN NOTE
1. NADINE for evaluation of ABHIJIT pre-DCCV    -No absolute contraindications of esophageal stricture, tumor, perforation, laceration,or diverticulum and/or active GI bleed  -The risks, benefits & alternatives of the procedure were explained to the patient.   -The risks of transesophageal echo include but are not limited to:  Dental trauma, esophageal trauma/perforation, bleeding, laryngospasm/brochospasm, aspiration, sore throat/hoarseness, & dislodgement of the endotracheal tube/nasogastric tube (where applicable).    -The risks of moderate sedation include hypotension, respiratory depression, arrhythmias, bronchospasm, & death.    -Informed consent was obtained. The patient is agreeable to proceed with the procedure and all questions and concerns addressed.    Case discussed with an attending in echocardiography lab.     Further recommendations per attending addendum

## 2019-09-24 NOTE — PROGRESS NOTES
[facility]  Cardiac Electrophysiology  Progress Note    Admission Date: 9/4/2019  Code Status: Full Code   Attending Physician: Jolene Lua MD   Expected Discharge Date: 9/27/2019  Principal Problem:LVAD (left ventricular assist device) present    Subjective:     Interval History: Remains in aflutter this morning. No issues overnight. Ambulated overnight without weakness or dizziness. Sleeping this morning.    Review of Systems   All other systems reviewed and are negative.    Objective:     Vital Signs (Most Recent):  Temp: 97.6 °F (36.4 °C) (09/24/19 0409)  Pulse: 100 (09/24/19 0409)  Resp: 16 (09/24/19 0409)  BP: (!) 78/0(unable to obtain cuff pressure) (09/24/19 0409)  SpO2: 98 % (09/24/19 0409) Vital Signs (24h Range):  Temp:  [97.6 °F (36.4 °C)-98.7 °F (37.1 °C)] 97.6 °F (36.4 °C)  Pulse:  [] 100  Resp:  [16-18] 16  SpO2:  [91 %-98 %] 98 %  BP: (76-88)/(0) 78/0     Weight: 108 kg (238 lb 1.6 oz)  Body mass index is 37.29 kg/m².     SpO2: 98 %  O2 Device (Oxygen Therapy): room air    Physical Exam   Constitutional: She is oriented to person, place, and time. She appears well-developed and well-nourished.   HENT:   Head: Normocephalic.   Eyes: Pupils are equal, round, and reactive to light.   Neck: Normal range of motion. Neck supple.   Cardiovascular: Normal rate and regular rhythm.   VAD hum   Pulmonary/Chest: Effort normal and breath sounds normal.   Abdominal: Soft. Bowel sounds are normal.   Musculoskeletal: Normal range of motion.   Neurological: She is alert and oriented to person, place, and time.   Skin: Skin is warm and dry.     Significant Labs: All pertinent lab results from the last 24 hours have been reviewed.    Significant Imaging: Telemetry reviewed    Assessment and Plan:     Atrial flutter  Previous history of afib per the patient +/- aflutter with noted baseline rhythm of aflutter on last device interrogation and symptomatic with lightheaded/dizziness Will plan  for NADINE/DCCV today.      Recs:   - INR 2 this morning  - NADINE/DCCV later today    ICD (implantable cardioverter-defibrillator) in place            Christa Chen MD  Cardiac Electrophysiology  [facility]

## 2019-09-24 NOTE — PROGRESS NOTES
[facility]  Heart Transplant  Progress Note    Patient Name: Deborah Navas  MRN: [id number]  Admission Date: 9/4/2019  Hospital Length of Stay: 20 days  Attending Physician: Jolene Lua MD  Primary Care Provider: Primary Doctor No  Principal Problem:LVAD (left ventricular assist device) present    Subjective:     Interval History: Had a good night. Excited that she got checked off on VAD education. No complaints this morning except no BM yesterday.      Scheduled Meds:   aspirin  325 mg Oral Daily    furosemide  40 mg Oral BID    gabapentin  400 mg Oral BID    mirtazapine  15 mg Oral QHS    oxyCODONE  20 mg Oral Q12H    pantoprazole  40 mg Oral Daily    polyethylene glycol  17 g Oral Daily    ramelteon  8 mg Oral QHS    senna-docusate 8.6-50 mg  2 tablet Oral BID    simethicone  1 tablet Oral QID (PC + HS)    venlafaxine  150 mg Oral Daily    warfarin  5 mg Oral Daily     PRN Meds:albuterol sulfate, bisacodyl, glycerin 99.5% **AND** magnesium citrate **AND** sodium chloride 0.9%, hydrocortisone, hydrOXYzine HCl, magnesium hydroxide 400 mg/5 ml, magnesium sulfate IVPB, ondansetron, oxyCODONE, oxyCODONE-acetaminophen, promethazine (PHENERGAN) IVPB, simethicone, sodium chloride 0.9%    Review of patient's allergies indicates:   Allergen Reactions    Adhesive Blisters     Reaction to area in chest and up only    Codeine Itching     Objective:     Vital Signs (Most Recent):  Temp: 98.2 °F (36.8 °C) (09/24/19 0800)  Pulse: 78 (09/24/19 0800)  Resp: 18 (09/24/19 0800)  BP: (!) 78/0 (09/24/19 0800)  SpO2: 98 % (09/24/19 0409) Vital Signs (24h Range):  Temp:  [97.6 °F (36.4 °C)-98.6 °F (37 °C)] 98.2 °F (36.8 °C)  Pulse:  [] 78  Resp:  [16-18] 18  SpO2:  [91 %-98 %] 98 %  BP: (76-88)/(0) 78/0     Patient Vitals for the past 72 hrs (Last 3 readings):   Weight   09/24/19 0500 108 kg (238 lb 1.6 oz)   09/23/19 0437 107.3 kg (236 lb 10.6 oz)   09/22/19 0500 106.5 kg (234 lb  12.6 oz)     Body mass index is 37.29 kg/m².      Intake/Output Summary (Last 24 hours) at 9/24/2019 0831  Last data filed at 9/24/2019 0600  Gross per 24 hour   Intake 1031.25 ml   Output 1350 ml   Net -318.75 ml        Telemetry: Aflutter.     Physical Exam   Constitutional: She is oriented to person, place, and time. She appears well-developed and well-nourished.   HENT:   Head: Normocephalic.   Eyes: Pupils are equal, round, and reactive to light.   Neck: Normal range of motion. Neck supple.   Cardiovascular: Normal rate and regular rhythm.   VAD hum   Pulmonary/Chest: Effort normal and breath sounds normal.   Abdominal: Soft. Bowel sounds are normal.   Musculoskeletal: Normal range of motion.   Neurological: She is alert and oriented to person, place, and time.   Skin: Skin is warm and dry.   Psychiatric: She has a normal mood and affect. Her behavior is normal.   Nursing note and vitals reviewed.    Significant Labs:  CBC:  Recent Labs   Lab 09/22/19  0418 09/22/19  1039 09/23/19  0458   WBC 12.29 13.43* 11.12  11.12   RBC 3.58* 3.66* 3.60*  3.60*   HGB 9.9* 10.1* 10.4*  10.4*   HCT 32.7* 33.5* 33.0*  33.0*   * 525* 563*  563*   MCV 91 92 92  92   MCH 27.7 27.6 28.9  28.9   MCHC 30.3* 30.1* 31.5*  31.5*     BNP:  Recent Labs   Lab 09/23/19  0458   *     CMP:  Recent Labs   Lab 09/22/19  0417 09/23/19 0458 09/24/19  0358   * 117* 108   CALCIUM 9.2 9.3 9.0   ALBUMIN 2.5* 2.7* 2.6*   PROT 6.6 6.7 6.7   * 135* 132*   K 4.6 4.6 4.5   CO2 29 32* 33*   CL 88* 88* 88*   BUN 32* 33* 31*   CREATININE 1.3 1.6* 1.6*   ALKPHOS 80 88 84   ALT 21 29 24   AST 29 42* 30   BILITOT 0.5 0.6 0.5      Coagulation:   Recent Labs   Lab 09/22/19  1039  09/23/19  0458 09/23/19  1353 09/24/19  0358   INR 1.7*  --  1.5*  --  2.0*   APTT 33.8*   < > 33.9*  33.9* 45.6* 39.6*  39.6*    < > = values in this interval not displayed.     LDH:  Recent Labs   Lab 09/22/19  0417 09/23/19  0458 09/24/19  0358    * 345* 307*     Microbiology:  Microbiology Results (last 7 days)     ** No results found for the last 168 hours. **        I have reviewed all pertinent labs within the past 24 hours.    Estimated Creatinine Clearance: 53.8 mL/min (A) (based on SCr of 1.6 mg/dL (H)).    Diagnostic Results:  I have reviewed all pertinent imaging results/findings within the past 24 hours.    Assessment and Plan:     No notes on file    * LVAD (left ventricular assist device) present  -HeartMate 3 Implanted 9/10/19 as DT.  -HTS Primary.  -Implanted by Dr. Walden  -INR therapeutic. Goal INR 2.0-3.0. Continue coumadin.  -Antiplatelets:  mg.  -LDH is stable overall today. Will continue to monitor daily.  -Speed set at 5200  -Not listed for OHTx.  - weaned off.   -Echo 9/18: LVIDD 5.69, TAPSE 0.86, AV does not open. The ventricular septum is at midline. Will repeat tomorrow.  -Continue Pt/OT/VAD education.    Procedure: Device Interrogation Including analysis of device parameters  Current Settings: Ventricular Assist Device  Review of device function is stable/unstable stable    TXP LVAD INTERROGATIONS 9/24/2019 9/24/2019 9/23/2019 9/23/2019 9/23/2019 9/23/2019 9/23/2019   Type HeartMate3 HeartMate3 HeartMate3 HeartMate3 HeartMate3 HeartMate3 HeartMate3   Flow 3.5 3.8 4.1 3.8 3.6 3.4 3.6   Speed 5200 5200 5200 5250 5200 5200 5200   PI 4.0 4.0 3.4 3.8 3.5 5.2 4.3   Power (Orellana) 3.5 3.5 3.6 3.6 3.6 3.7 3.5   LSL 4800 - - - 4800 4800 4800   Pulsatility No Pulse No Pulse No Pulse No Pulse No Pulse No Pulse No Pulse       Atrial flutter  -Continue Amio/anticoagulation.   -EP Cx. Plan for NADINE/DCCV today.     Constipation  -Continue Senna/Colace/miralax.  -Continue prn milk of Mag. Will add lactulose as well.      Pleural effusion  -Bilateral pleural effusions present, L>R.    CKD (chronic kidney disease)  -Creatinine at baseline  - Avoid nephrotoxic agents    Abnormal CT scan  -focal opacity found at right base.  DDx:  atelectasis.  Recommending repeat CT in 3 months    Enlarged thyroid  Ultrasound done and showed enlarged multinodular thyroid which warrants surveillance in one year.   - TSH and fT4 wnl    History of ventricular tachycardia  -In the setting of hypokalemia  -Monitor electrolytes closely    Acute on chronic combined systolic and diastolic heart failure  NICM EF 20% s/p LVAD 9/10  RHC: done on admit 9/4/19 RA: 20/ 20/ 18 RV: 60/ 8/ 18 PA: 60/ 32/ 45 PWP: 43/ 70/ 40 . Cardiac output was 2.27 by Fitz. Cardiac index is 1.04 L/min/m2. O2 Sat: PA 34%. AO 95% PVR 2.2 PALMER    -see s/p LVAD      ICD (implantable cardioverter-defibrillator) in place  -Medtronic CRT-D.  -Patient began v-pacing at 59 and 9/14/19 began having phrenic nerve stimulation. Amiodarone held and EP consulted, device settings changed and PNS ceased.       Americo Albert NP  Heart Transplant  [facility]

## 2019-09-24 NOTE — CONSULTS
[facility]  Cardiology  Consult Note    Patient Name: Deborah Navas  MRN: [id number]  Admission Date: 9/4/2019  Hospital Length of Stay: 20 days  Code Status: Full Code   Attending Provider: Jolene Lua MD   Consulting Provider: Nasir Page MD  Primary Care Physician: Primary Doctor No  Principal Problem:LVAD (left ventricular assist device) present    Patient information was obtained from patient and past medical records.     Consults: NADINE for DCCV  Subjective:     Chief Complaint:  Palpitations     HPI:   This is a 49yo woman here for pre-DCCV NADINE. Has a history of NICM EF 20%, s/p HM3 @ 5200rpm earlier this month, who has been in atrial flutter. Additional history of CKD3, hypothyroidism, VT. Currently denies symptoms of angina, heart failure, dysphagia, no contra-indications to NADINE.    Dysphagia or odynophagia:  No  Liver Disease, esophageal disease, or known varices:  No  Upper GI Bleeding: No  Snoring:  Yes  Sleep Apnea:  No  Prior neck surgery or radiation:  No  History of anesthetic difficulties:  No  Family history of anesthetic difficulties:  No  Last oral intake:  12 hours ago  Able to move neck in all directions:  Yes  Anticoagulation/Antiplatelets: Coumadin    Lab Results   Component Value Date     (H) 09/23/2019     (H) 09/23/2019     Lab Results   Component Value Date    HGB 10.4 (L) 09/23/2019    HGB 10.4 (L) 09/23/2019     Lab Results   Component Value Date    INR 2.0 (H) 09/24/2019    INR 1.5 (H) 09/23/2019    INR 1.7 (H) 09/22/2019       TT ECHO:  Transthoracic echo (TTE) complete (Cupid Only):   Results for orders placed or performed during the hospital encounter of 09/04/19   Echo Color Flow Doppler? Yes; Bubble Contrast? No   Result Value Ref Range    Ascending aorta 3.38 cm    STJ 2.94 cm    IVS 0.77 0.6 - 1.1 cm    LA size 2.77 cm    Left Atrium Major Axis 5.30 cm    Left Atrium Minor Axis 4.99 cm    LVIDD 5.69 3.5 - 6.0 cm    LVIDS 5.01 (A)  2.1 - 4.0 cm    LVOT diameter 2.04 cm    PW 0.79 0.6 - 1.1 cm    MV Peak A Scott 0.44 m/s    E wave decelartion time 148.13 msec    MV Peak E Scott 0.73 m/s    RA Major Axis 4.95 cm    RA Width 3.35 cm    RVDD 4.60 cm    Sinus 3.02 cm    TAPSE 0.86 cm    TR Max Scott 1.98 m/s    TDI LATERAL 0.05 m/s    LA WIDTH 3.37 cm    LV Diastolic Volume 159.42 mL    LV Systolic Volume 118.88 mL    LV LATERAL E/E' RATIO 14.60 m/s    FS 12 %    LA volume 40.79 cm3    LV mass 164.39 g    Left Ventricle Relative Wall Thickness 0.28 cm    E/A ratio 1.66     LVOT area 3.3 cm2    LV Systolic Volume Index 55.1 mL/m2    LV Diastolic Volume Index 73.89 mL/m2    LA Volume Index 18.9 mL/m2    LV Mass Index 76 g/m2    Triscuspid Valve Regurgitation Peak Gradient 16 mmHg    BSA 2.24 m2    Right Atrial Pressure (from IVC) 15 mmHg    TV rest pulmonary artery pressure 31 mmHg    Narrative    · LVAD present. Base speed is 5200. The pump type is a Heartmate III. The   aortic valve does not appear to open but very challenging to see. LVEDD of   5.7 cm. ventricular septum midline  · Moderate to severe tricuspid regurgitation- little pressure difference   between RV and RA  · Severely decreased left ventricular systolic function. The estimated   ejection fraction is 15%  · Mild-to-moderate mitral regurgitation.  · Grade I (mild) left ventricular diastolic dysfunction consistent with   impaired relaxation. Normal left atrial pressure.  · Moderate right ventricular enlargement. Moderately reduced right   ventricular systolic function.  · Elevated central venous pressure (15 mm Hg).  · The estimated PA systolic pressure is 31 mm Hg- may not be an accurate   reflection of pulmonary pressures     Underlying atrial fibrillation possible.         NADINE/EGD: None on file      Past Medical History:   Diagnosis Date    Fractures     History of ventricular fibrillation 9/4/2019    History of ventricular tachycardia 9/4/2019    Hyperlipidemia     Migraine      Osteoarthritis        Past Surgical History:   Procedure Laterality Date    BACK SURGERY      2007    BONE GRAFT Left     from Left hip to Left FA    eardrum reconstruction  1980    ELBOW SURGERY Left [id number]    FOREARM FRACTURE SURGERY Bilateral [id number]    multiple surgeries    INSERTION OF GRAFT TO PERICARDIUM  9/10/2019    Procedure: INSERTION, GRAFT, PERICARDIUM;  Surgeon: Shar Walden MD;  Location: [facility] OR [address] [place] [address];  Service: Cardiovascular;;    INSERTION OF IMPLANTABLE CARDIOVERTER-DEFIBRILLATOR (ICD) GENERATOR WITH TWO EXISTING LEADS      INSERTION OF PACEMAKER Left     LEFT VENTRICULAR ASSIST DEVICE N/A 9/10/2019    Procedure: INSERTION-LEFT VENTRICULAR ASSIST DEVICE;  Surgeon: Shar Walden MD;  Location: [facility] OR [address] [place] [address];  Service: Cardiovascular;  Laterality: N/A;  DT HM3     LUMBAR FUSION  2007    L4-L5    RIGHT HEART CATHETERIZATION Right 9/4/2019    Procedure: INSERTION, CATHETER, RIGHT HEART;  Surgeon: Vi Bryant MD;  Location: [facility] CATH LAB;  Service: Cardiology;  Laterality: Right;    SINUS SURGERY Right 1994    with lymph nodes    STERNAL WOUND CLOSURE  9/10/2019    Procedure: CLOSURE, WOUND, STERNUM;  Surgeon: Shar Walden MD;  Location: [facility] OR [address] [place] [address];  Service: Cardiovascular;;    TEMPOROMANDIBULAR JOINT SURGERY Right 1988    TONSILLECTOMY      TYMPANOSTOMY TUBE PLACEMENT  1971- 1979    multiple tube placements       Review of patient's allergies indicates:   Allergen Reactions    Adhesive Blisters     Reaction to area in chest and up only    Codeine Itching       No current facility-administered medications on file prior to encounter.      Current Outpatient Medications on File Prior to Encounter   Medication Sig    amiodarone (PACERONE) 200 MG Tab Take 200 mg by mouth 2 (two) times daily.     aspirin (ECOTRIN) 81 MG EC tablet Take 81 mg by mouth.    atorvastatin (LIPITOR) 40 MG tablet Take 40 mg by mouth once daily.    cpm-phenyleph-acetaminophen (NOREL  AD) 4- mg Tab Take by mouth 2 (two) times daily.    docusate sodium (COLACE) 100 MG capsule Take 100 mg by mouth once daily.    furosemide (LASIX) 40 MG tablet Take 40 mg by mouth.    gabapentin (NEURONTIN) 800 MG tablet Take 400 mg by mouth 3 (three) times daily.     loratadine (CLARITIN) 10 mg tablet Take 10 mg by mouth daily as needed.    oxycodone-acetaminophen (PERCOCET) 5-325 mg per tablet Take 1 tablet by mouth every 4 (four) hours as needed for Pain.    potassium chloride (K-TAB) 20 mEq Take 2 tablets by mouth.    promethazine (PHENERGAN) 25 MG tablet Take 25 mg by mouth every 6 (six) hours as needed for Nausea.    spironolactone (ALDACTONE) 25 MG tablet Take 25 mg by mouth.    VENLAFAXINE HCL (EFFEXOR ORAL) Take 150 mg by mouth once daily.    ZOLPIDEM TARTRATE (AMBIEN ORAL) Take 12.5 mg by mouth nightly as needed.    metOLazone (ZAROXOLYN) 5 MG tablet Take 2.5 mg by mouth daily as needed.     Family History     Problem Relation (Age of Onset)    Broken bones Maternal Grandmother    Cancer Paternal Grandmother    Dislocations Maternal Grandmother    Heart failure Paternal Grandfather, Maternal Grandfather    Migraines Mother, Maternal Grandmother, Paternal Grandmother, Paternal Grandfather, Maternal Grandfather    Obesity Paternal Grandmother    Osteoarthritis Mother, Maternal Grandmother, Paternal Grandmother, Paternal Grandfather, Maternal Grandfather, Father    Osteoporosis Maternal Grandmother    Scoliosis Maternal Grandmother    Stroke Father        Tobacco Use    Smoking status: Never Smoker    Smokeless tobacco: Never Used   Substance and Sexual Activity    Alcohol use: No    Drug use: No    Sexual activity: Not Currently     Review of Systems   Constitution: Negative for chills, fever and weight gain.   HENT: Negative for congestion.    Eyes: Negative for visual disturbance.   Cardiovascular: Negative for chest pain, claudication, dyspnea on exertion, leg swelling, orthopnea,  palpitations and syncope.   Respiratory: Positive for shortness of breath. Negative for cough and snoring.    Hematologic/Lymphatic: Does not bruise/bleed easily.   Skin: Negative for rash.   Musculoskeletal: Negative for muscle cramps and myalgias.   Gastrointestinal: Negative for bloating, abdominal pain, constipation, diarrhea and melena.   Genitourinary: Negative for bladder incontinence.   Neurological: Negative for excessive daytime sleepiness, focal weakness and weakness.   Psychiatric/Behavioral: Negative for depression and suicidal ideas.     Objective:     Vital Signs (Most Recent):  Temp: 98.2 °F (36.8 °C) (09/24/19 0800)  Pulse: 78 (09/24/19 0800)  Resp: 18 (09/24/19 0800)  BP: (!) 78/0 (09/24/19 0800)  SpO2: 98 % (09/24/19 0409) Vital Signs (24h Range):  Temp:  [97.6 °F (36.4 °C)-98.6 °F (37 °C)] 98.2 °F (36.8 °C)  Pulse:  [] 78  Resp:  [16-18] 18  SpO2:  [91 %-98 %] 98 %  BP: (76-88)/(0) 78/0     Weight: 108 kg (238 lb 1.6 oz)  Body mass index is 37.29 kg/m².    SpO2: 98 %  O2 Device (Oxygen Therapy): room air      Intake/Output Summary (Last 24 hours) at 9/24/2019 0918  Last data filed at 9/24/2019 0600  Gross per 24 hour   Intake 1031.25 ml   Output 1350 ml   Net -318.75 ml       Lines/Drains/Airways     Line                 VAD 09/10/19 1322 Left ventricular assist device HeartMate 3 13 days          Peripheral Intravenous Line                 Peripheral IV - Single Lumen 09/20/19 2151 20 G Left Antecubital 3 days                Physical Exam   Constitutional: She is oriented to person, place, and time. She appears well-developed and well-nourished. No distress.   Obese   HENT:   Head: Normocephalic and atraumatic.   Mouth/Throat: Oropharynx is clear and moist.   Eyes: Pupils are equal, round, and reactive to light. Conjunctivae and EOM are normal. No scleral icterus.   Neck: Normal range of motion. Neck supple. No JVD present.   Cardiovascular: Normal rate, regular rhythm, normal heart sounds  and intact distal pulses.   No murmur heard.  Pulses:       Radial pulses are 2+ on the right side, and 2+ on the left side.   LVAD hum   Pulmonary/Chest: Effort normal and breath sounds normal. No respiratory distress.   Symmetrical expansion   Abdominal: Soft. Bowel sounds are normal. There is no hepatosplenomegaly. There is no tenderness.   Musculoskeletal: Normal range of motion. She exhibits no edema.   Neurological: She is alert and oriented to person, place, and time. No cranial nerve deficit.   Skin: Skin is warm and dry. No rash noted. She is not diaphoretic.   Psychiatric: She has a normal mood and affect. Judgment and thought content normal.         Assessment and Plan:     Atrial flutter  1. NADINE for evaluation of ABHIJIT pre-DCCV    -No absolute contraindications of esophageal stricture, tumor, perforation, laceration,or diverticulum and/or active GI bleed  -The risks, benefits & alternatives of the procedure were explained to the patient.   -The risks of transesophageal echo include but are not limited to:  Dental trauma, esophageal trauma/perforation, bleeding, laryngospasm/brochospasm, aspiration, sore throat/hoarseness, & dislodgement of the endotracheal tube/nasogastric tube (where applicable).    -The risks of moderate sedation include hypotension, respiratory depression, arrhythmias, bronchospasm, & death.    -Informed consent was obtained. The patient is agreeable to proceed with the procedure and all questions and concerns addressed.    Case discussed with an attending in echocardiography lab.     Further recommendations per attending addendum         VTE Risk Mitigation (From admission, onward)         Ordered     warfarin (COUMADIN) tablet 5 mg  Daily      09/22/19 0725     IP VTE HIGH RISK PATIENT  Once      09/11/19 0651     Reason for No Pharmacological VTE Prophylaxis  Once      09/11/19 0651     Reason for no Mechanical VTE Prophylaxis  Once      09/10/19 1215                Thank you for  your consult.     Nasir Page MD  Cardiology   [facility]

## 2019-09-25 LAB
ALBUMIN SERPL BCP-MCNC: 2.6 G/DL (ref 3.5–5.2)
ALP SERPL-CCNC: 81 U/L (ref 55–135)
ALT SERPL W/O P-5'-P-CCNC: 21 U/L (ref 10–44)
ANION GAP SERPL CALC-SCNC: 12 MMOL/L (ref 8–16)
APTT BLDCRRT: 35.5 SEC (ref 21–32)
ASCENDING AORTA: 3.34 CM
AST SERPL-CCNC: 26 U/L (ref 10–40)
BASOPHILS # BLD AUTO: 0.05 K/UL (ref 0–0.2)
BASOPHILS NFR BLD: 0.6 % (ref 0–1.9)
BILIRUB SERPL-MCNC: 0.5 MG/DL (ref 0.1–1)
BNP SERPL-MCNC: 667 PG/ML (ref 0–99)
BSA FOR ECHO PROCEDURE: 2.25 M2
BUN SERPL-MCNC: 29 MG/DL (ref 6–20)
CALCIUM SERPL-MCNC: 8.9 MG/DL (ref 8.7–10.5)
CHLORIDE SERPL-SCNC: 89 MMOL/L (ref 95–110)
CO2 SERPL-SCNC: 33 MMOL/L (ref 23–29)
CREAT SERPL-MCNC: 1.8 MG/DL (ref 0.5–1.4)
CV ECHO LV RWT: 0.33 CM
DIFFERENTIAL METHOD: ABNORMAL
DOP CALC LVOT AREA: 2.4 CM2
DOP CALC LVOT DIAMETER: 1.75 CM
E WAVE DECELERATION TIME: 184.21 MSEC
E/A RATIO: 0.98
E/E' RATIO: 10.17 M/S
ECHO LV POSTERIOR WALL: 0.89 CM (ref 0.6–1.1)
EOSINOPHIL # BLD AUTO: 0.3 K/UL (ref 0–0.5)
EOSINOPHIL NFR BLD: 3.6 % (ref 0–8)
ERYTHROCYTE [DISTWIDTH] IN BLOOD BY AUTOMATED COUNT: 15.8 % (ref 11.5–14.5)
EST. GFR  (AFRICAN AMERICAN): 37.6 ML/MIN/1.73 M^2
EST. GFR  (NON AFRICAN AMERICAN): 32.6 ML/MIN/1.73 M^2
FRACTIONAL SHORTENING: 7 % (ref 28–44)
GLUCOSE SERPL-MCNC: 138 MG/DL (ref 70–110)
HCT VFR BLD AUTO: 31.5 % (ref 37–48.5)
HGB BLD-MCNC: 9.6 G/DL (ref 12–16)
IMM GRANULOCYTES # BLD AUTO: 0.09 K/UL (ref 0–0.04)
IMM GRANULOCYTES NFR BLD AUTO: 1.1 % (ref 0–0.5)
INR PPP: 2.3 (ref 0.8–1.2)
INTERVENTRICULAR SEPTUM: 0.84 CM (ref 0.6–1.1)
IVRT: 0.09 MSEC
LA MAJOR: 5.28 CM
LA MINOR: 5.74 CM
LA WIDTH: 3.79 CM
LDH SERPL L TO P-CCNC: 287 U/L (ref 110–260)
LEFT ATRIUM SIZE: 3.54 CM
LEFT ATRIUM VOLUME INDEX: 28.9 ML/M2
LEFT ATRIUM VOLUME: 62.73 CM3
LEFT INTERNAL DIMENSION IN SYSTOLE: 5.01 CM (ref 2.1–4)
LEFT VENTRICLE DIASTOLIC VOLUME INDEX: 64.15 ML/M2
LEFT VENTRICLE DIASTOLIC VOLUME: 139.16 ML
LEFT VENTRICLE MASS INDEX: 78 G/M2
LEFT VENTRICLE SYSTOLIC VOLUME INDEX: 54.8 ML/M2
LEFT VENTRICLE SYSTOLIC VOLUME: 118.85 ML
LEFT VENTRICULAR INTERNAL DIMENSION IN DIASTOLE: 5.36 CM (ref 3.5–6)
LEFT VENTRICULAR MASS: 169.01 G
LV LATERAL E/E' RATIO: 10.17 M/S
LV SEPTAL E/E' RATIO: 10.17 M/S
LYMPHOCYTES # BLD AUTO: 0.8 K/UL (ref 1–4.8)
LYMPHOCYTES NFR BLD: 9.9 % (ref 18–48)
MAGNESIUM SERPL-MCNC: 2.8 MG/DL (ref 1.6–2.6)
MCH RBC QN AUTO: 28.1 PG (ref 27–31)
MCHC RBC AUTO-ENTMCNC: 30.5 G/DL (ref 32–36)
MCV RBC AUTO: 92 FL (ref 82–98)
MONOCYTES # BLD AUTO: 0.8 K/UL (ref 0.3–1)
MONOCYTES NFR BLD: 9.4 % (ref 4–15)
MV PEAK A VEL: 0.62 M/S
MV PEAK E VEL: 0.61 M/S
NEUTROPHILS # BLD AUTO: 6.4 K/UL (ref 1.8–7.7)
NEUTROPHILS NFR BLD: 75.4 % (ref 38–73)
NRBC BLD-RTO: 1 /100 WBC
PISA TR MAX VEL: 2.62 M/S
PLATELET # BLD AUTO: 530 K/UL (ref 150–350)
PMV BLD AUTO: 9.2 FL (ref 9.2–12.9)
POTASSIUM SERPL-SCNC: 4.4 MMOL/L (ref 3.5–5.1)
PREALB SERPL-MCNC: 9 MG/DL (ref 20–43)
PROT SERPL-MCNC: 6.5 G/DL (ref 6–8.4)
PROTHROMBIN TIME: 22.7 SEC (ref 9–12.5)
PULM VEIN S/D RATIO: 1.1
PV PEAK D VEL: 0.59 M/S
PV PEAK S VEL: 0.65 M/S
RA MAJOR: 4.68 CM
RA WIDTH: 3.12 CM
RBC # BLD AUTO: 3.42 M/UL (ref 4–5.4)
RIGHT VENTRICULAR END-DIASTOLIC DIMENSION: 4.35 CM
RV TISSUE DOPPLER FREE WALL SYSTOLIC VELOCITY 1 (APICAL 4 CHAMBER VIEW): 6.4 CM/S
SINUS: 3.04 CM
SODIUM SERPL-SCNC: 134 MMOL/L (ref 136–145)
STJ: 2.65 CM
TDI LATERAL: 0.06 M/S
TDI SEPTAL: 0.06 M/S
TDI: 0.06 M/S
TR MAX PG: 27 MMHG
TRICUSPID ANNULAR PLANE SYSTOLIC EXCURSION: 0.75 CM
WBC # BLD AUTO: 8.51 K/UL (ref 3.9–12.7)

## 2019-09-25 PROCEDURE — 83880 ASSAY OF NATRIURETIC PEPTIDE: CPT | Mod: NTX

## 2019-09-25 PROCEDURE — 85730 THROMBOPLASTIN TIME PARTIAL: CPT | Mod: NTX

## 2019-09-25 PROCEDURE — 83615 LACTATE (LD) (LDH) ENZYME: CPT | Mod: NTX

## 2019-09-25 PROCEDURE — 20600001 HC STEP DOWN PRIVATE ROOM: Mod: NTX

## 2019-09-25 PROCEDURE — 25000003 PHARM REV CODE 250: Mod: NTX | Performed by: STUDENT IN AN ORGANIZED HEALTH CARE EDUCATION/TRAINING PROGRAM

## 2019-09-25 PROCEDURE — 63600175 PHARM REV CODE 636 W HCPCS: Mod: NTX | Performed by: STUDENT IN AN ORGANIZED HEALTH CARE EDUCATION/TRAINING PROGRAM

## 2019-09-25 PROCEDURE — 85025 COMPLETE CBC W/AUTO DIFF WBC: CPT | Mod: NTX

## 2019-09-25 PROCEDURE — 85610 PROTHROMBIN TIME: CPT | Mod: NTX

## 2019-09-25 PROCEDURE — 25000003 PHARM REV CODE 250: Mod: NTX | Performed by: NURSE PRACTITIONER

## 2019-09-25 PROCEDURE — 25000003 PHARM REV CODE 250: Mod: NTX | Performed by: INTERNAL MEDICINE

## 2019-09-25 PROCEDURE — 27000248 HC VAD-ADDITIONAL DAY: Mod: NTX

## 2019-09-25 PROCEDURE — 93005 ELECTROCARDIOGRAM TRACING: CPT | Mod: NTX

## 2019-09-25 PROCEDURE — 93750 PR INTERROGATE VENT ASSIST DEV, IN PERSON, W PHYSICIAN ANALYSIS: ICD-10-PCS | Mod: NTX,,, | Performed by: INTERNAL MEDICINE

## 2019-09-25 PROCEDURE — 93750 INTERROGATION VAD IN PERSON: CPT | Mod: NTX,,, | Performed by: INTERNAL MEDICINE

## 2019-09-25 PROCEDURE — 97116 GAIT TRAINING THERAPY: CPT | Mod: NTX

## 2019-09-25 PROCEDURE — 80053 COMPREHEN METABOLIC PANEL: CPT | Mod: NTX

## 2019-09-25 PROCEDURE — 93010 ELECTROCARDIOGRAM REPORT: CPT | Mod: NTX,,, | Performed by: INTERNAL MEDICINE

## 2019-09-25 PROCEDURE — 99900035 HC TECH TIME PER 15 MIN (STAT): Mod: NTX

## 2019-09-25 PROCEDURE — 93010 EKG 12-LEAD: ICD-10-PCS | Mod: NTX,,, | Performed by: INTERNAL MEDICINE

## 2019-09-25 PROCEDURE — 83735 ASSAY OF MAGNESIUM: CPT | Mod: NTX

## 2019-09-25 PROCEDURE — 36415 COLL VENOUS BLD VENIPUNCTURE: CPT | Mod: NTX

## 2019-09-25 PROCEDURE — 84134 ASSAY OF PREALBUMIN: CPT | Mod: NTX

## 2019-09-25 PROCEDURE — 94761 N-INVAS EAR/PLS OXIMETRY MLT: CPT | Mod: NTX

## 2019-09-25 RX ADMIN — FUROSEMIDE 40 MG: 40 TABLET ORAL at 05:09

## 2019-09-25 RX ADMIN — ACETAMINOPHEN 1000 MG: 10 INJECTION, SOLUTION INTRAVENOUS at 05:09

## 2019-09-25 RX ADMIN — RAMELTEON 8 MG: 8 TABLET ORAL at 08:09

## 2019-09-25 RX ADMIN — MIRTAZAPINE 15 MG: 15 TABLET, FILM COATED ORAL at 08:09

## 2019-09-25 RX ADMIN — WARFARIN SODIUM 3 MG: 3 TABLET ORAL at 05:09

## 2019-09-25 RX ADMIN — ACETAMINOPHEN 1000 MG: 500 TABLET ORAL at 02:09

## 2019-09-25 RX ADMIN — POLYETHYLENE GLYCOL 3350 17 G: 17 POWDER, FOR SOLUTION ORAL at 09:09

## 2019-09-25 RX ADMIN — FUROSEMIDE 40 MG: 40 TABLET ORAL at 09:09

## 2019-09-25 RX ADMIN — SIMETHICONE CHEW TAB 80 MG 80 MG: 80 TABLET ORAL at 07:09

## 2019-09-25 RX ADMIN — LACTULOSE 20 G: 20 SOLUTION ORAL at 03:09

## 2019-09-25 RX ADMIN — GABAPENTIN 400 MG: 100 CAPSULE ORAL at 08:09

## 2019-09-25 RX ADMIN — GABAPENTIN 400 MG: 100 CAPSULE ORAL at 09:09

## 2019-09-25 RX ADMIN — OXYCODONE HYDROCHLORIDE 20 MG: 20 TABLET, FILM COATED, EXTENDED RELEASE ORAL at 09:09

## 2019-09-25 RX ADMIN — VENLAFAXINE 150 MG: 37.5 TABLET ORAL at 09:09

## 2019-09-25 RX ADMIN — ACETAMINOPHEN 1000 MG: 500 TABLET ORAL at 10:09

## 2019-09-25 RX ADMIN — SIMETHICONE CHEW TAB 80 MG 80 MG: 80 TABLET ORAL at 02:09

## 2019-09-25 RX ADMIN — SIMETHICONE CHEW TAB 80 MG 80 MG: 80 TABLET ORAL at 10:09

## 2019-09-25 RX ADMIN — SIMETHICONE CHEW TAB 80 MG 80 MG: 80 TABLET ORAL at 09:09

## 2019-09-25 RX ADMIN — PANTOPRAZOLE SODIUM 40 MG: 40 TABLET, DELAYED RELEASE ORAL at 09:09

## 2019-09-25 RX ADMIN — SENNOSIDES AND DOCUSATE SODIUM 2 TABLET: 8.6; 5 TABLET ORAL at 09:09

## 2019-09-25 RX ADMIN — OXYCODONE HYDROCHLORIDE 20 MG: 20 TABLET, FILM COATED, EXTENDED RELEASE ORAL at 08:09

## 2019-09-25 RX ADMIN — SENNOSIDES AND DOCUSATE SODIUM 2 TABLET: 8.6; 5 TABLET ORAL at 08:09

## 2019-09-25 RX ADMIN — ASPIRIN 325 MG: 325 TABLET, COATED ORAL at 09:09

## 2019-09-25 RX ADMIN — LACTULOSE 20 G: 20 SOLUTION ORAL at 09:09

## 2019-09-25 NOTE — PLAN OF CARE
Problem: Physical Therapy Goal  Goal: Physical Therapy Goal  Description  Goals to be met by: 10/1/2019    Patient will increase functional independence with mobility by performin. Supine to sit with Contact Guard Assistance with HOB flat - not met  2. Sit to stand transfer with Supervision - not met  3. Gait  x 200 feet with Supervision  - not met  4. Ascend/descend 2 stair with Contact Guard Assistance - not met        Outcome: Ongoing, Progressing    Pt is progressing toward goals. All goals remain appropriate.    Consuelo Toledo, PT, DPT  2019  [phone]

## 2019-09-25 NOTE — ASSESSMENT & PLAN NOTE
-Bilateral pleural effusions present, L>R.  -IR consulted for thoracentesis on left and planned for tomorrow

## 2019-09-25 NOTE — PLAN OF CARE
Pt free of falls/trauma/injuries.  Denies c/o SOB, CP, or discomfort.  Generalized skin remains CDI; generalized edema noted.  LVAD working properly this shift without any complications.  LVAD dressing to be changed daily with soap/NS; dressing remains CDI. Pt being diuresed with lasix; diuresing well.   Wt remains stable. DCCV/NADINE completed 9/24; patient remains in SR. MSI CDI. Pt and family checked off on alarms and dressing. Possible D/C home by the end of the week with HH; referral sent. Heparin gtt D/C'd; INR 2.0. Pain being controlled with Oxycotin 20mg and Tylenol 1,000mg. VSS. Fall bundle in place. POC explained, no questions at this time. Pt tolerating plan of care.

## 2019-09-25 NOTE — CONSULTS
Radiology Consult    Deborah Navas is a 48 y.o. female with an LVAD and a large left pleural effusion for which IR is consulted to perform a thoracentesis.     Past Medical History:   Diagnosis Date    Fractures     History of ventricular fibrillation 9/4/2019    History of ventricular tachycardia 9/4/2019    Hyperlipidemia     Migraine     Osteoarthritis      Past Surgical History:   Procedure Laterality Date    BACK SURGERY      2007    BONE GRAFT Left     from Left hip to Left FA    eardrum reconstruction  1980    ELBOW SURGERY Left [id number]    FOREARM FRACTURE SURGERY Bilateral [id number]    multiple surgeries    INSERTION OF GRAFT TO PERICARDIUM  9/10/2019    Procedure: INSERTION, GRAFT, PERICARDIUM;  Surgeon: Shar Walden MD;  Location: [facility] OR [address] [place] [address];  Service: Cardiovascular;;    INSERTION OF IMPLANTABLE CARDIOVERTER-DEFIBRILLATOR (ICD) GENERATOR WITH TWO EXISTING LEADS      INSERTION OF PACEMAKER Left     LEFT VENTRICULAR ASSIST DEVICE N/A 9/10/2019    Procedure: INSERTION-LEFT VENTRICULAR ASSIST DEVICE;  Surgeon: Shar Walden MD;  Location: [facility] OR [address] [place] [address];  Service: Cardiovascular;  Laterality: N/A;  DT HM3     LUMBAR FUSION  2007    L4-L5    RIGHT HEART CATHETERIZATION Right 9/4/2019    Procedure: INSERTION, CATHETER, RIGHT HEART;  Surgeon: Vi Bryant MD;  Location: [facility] CATH LAB;  Service: Cardiology;  Laterality: Right;    SINUS SURGERY Right 1994    with lymph nodes    STERNAL WOUND CLOSURE  9/10/2019    Procedure: CLOSURE, WOUND, STERNUM;  Surgeon: Shar Walden MD;  Location: [facility] OR [address] [place] [address];  Service: Cardiovascular;;    TEMPOROMANDIBULAR JOINT SURGERY Right 1988    TONSILLECTOMY      TYMPANOSTOMY TUBE PLACEMENT  1971- 1979    multiple tube placements       Scheduled Meds:    acetaminophen  1,000 mg Oral Q8H    aspirin  325 mg Oral Daily    furosemide  40 mg Oral BID    gabapentin  400 mg Oral BID    mirtazapine  15 mg Oral QHS    oxyCODONE  20 mg  Oral Q12H    pantoprazole  40 mg Oral Daily    polyethylene glycol  17 g Oral Daily    ramelteon  8 mg Oral QHS    senna-docusate 8.6-50 mg  2 tablet Oral BID    simethicone  1 tablet Oral QID (PC + HS)    venlafaxine  150 mg Oral Daily    warfarin  3 mg Oral Daily     Continuous Infusions:   PRN Meds:albuterol sulfate, bisacodyl, glycerin 99.5% **AND** magnesium citrate **AND** sodium chloride 0.9%, hydrocortisone, hydrOXYzine HCl, lactulose, magnesium hydroxide 400 mg/5 ml, ondansetron, oxyCODONE, oxyCODONE-acetaminophen, promethazine (PHENERGAN) IVPB, simethicone, sodium chloride 0.9%, sodium chloride 0.9%    Allergies:   Review of patient's allergies indicates:   Allergen Reactions    Adhesive Blisters     Reaction to area in chest and up only    Codeine Itching       Labs:  Recent Labs   Lab 09/25/19  0335   INR 2.3*       Recent Labs   Lab 09/25/19  0335   WBC 8.51   HGB 9.6*   HCT 31.5*   MCV 92   *      Recent Labs   Lab 09/25/19  0335   *   *   K 4.4   CL 89*   CO2 33*   BUN 29*   CREATININE 1.8*   CALCIUM 8.9   MG 2.8*   ALT 21   AST 26   ALBUMIN 2.6*   BILITOT 0.5         Vitals (Most Recent):  Temp: 98.2 °F (36.8 °C) (09/25/19 1508)  Pulse: 107 (09/25/19 1508)  Resp: 18 (09/25/19 1508)  BP: 92/71 (09/25/19 1508)  SpO2: 98 % (09/25/19 1508)    Plan:   Will plan for thoracentesis by IR tomorrow.       Willa Singh MD   Department of Radiology  PGY III Resident  Pager: [phone]

## 2019-09-25 NOTE — ASSESSMENT & PLAN NOTE
-HeartMate 3 Implanted 9/10/19 as DT.  -HTS Primary.  -Implanted by Dr. Walden  -INR therapeutic. Goal INR 2.0-3.0. Continue coumadin.  -Antiplatelets:  mg.  -LDH is stable overall today. Will continue to monitor daily.  -Speed set at 5200  -Not listed for OHTx.  - weaned off.   -Echo 9/18: LVIDD 5.69, TAPSE 0.86, AV does not open. The ventricular septum is at midline. Repeating today  -Continue Pt/OT/VAD education.    Procedure: Device Interrogation Including analysis of device parameters  Current Settings: Ventricular Assist Device  Review of device function is stable    TXP LVAD INTERROGATIONS 9/25/2019 9/24/2019 9/24/2019 9/24/2019 9/24/2019 9/23/2019 9/23/2019   Type HeartMate3 HeartMate3 HeartMate3 HeartMate3 HeartMate3 HeartMate3 HeartMate3   Flow 3.8 4.0 4.0 3.5 3.8 4.1 3.8   Speed 5200 5200 5200 5200 5200 5200 5250   PI 3.3 3.7 4.0 4.0 4.0 3.4 3.8   Power (Orellana) 3.5 3.6 3.7 3.5 3.5 3.6 3.6   LSL 4800 4800 4800 4800 - - -   Pulsatility - - - No Pulse No Pulse No Pulse No Pulse

## 2019-09-25 NOTE — PROGRESS NOTES
Patient is doing well, DLES 2 but healing. Bruising noted to driveline area, per patient this is resolving since implant. Will continue to monitor.

## 2019-09-25 NOTE — PROGRESS NOTES
[facility]  Heart Transplant  Progress Note    Patient Name: Deborah Navas  MRN: [id number]  Admission Date: 9/4/2019  Hospital Length of Stay: 21 days  Attending Physician: Jolene Lua MD  Primary Care Provider: Primary Doctor No  Principal Problem:LVAD (left ventricular assist device) present    Subjective:     Interval History: Patient is s/p DCCV.  She reports feeling better and dizziness has improved.  Has significant pleural effusion: IR consulted for thoracentesis.  Echo done this am      Scheduled Meds:   acetaminophen  1,000 mg Oral Q8H    aspirin  325 mg Oral Daily    furosemide  40 mg Oral BID    gabapentin  400 mg Oral BID    mirtazapine  15 mg Oral QHS    oxyCODONE  20 mg Oral Q12H    pantoprazole  40 mg Oral Daily    polyethylene glycol  17 g Oral Daily    ramelteon  8 mg Oral QHS    senna-docusate 8.6-50 mg  2 tablet Oral BID    simethicone  1 tablet Oral QID (PC + HS)    venlafaxine  150 mg Oral Daily    warfarin  3 mg Oral Daily     PRN Meds:albuterol sulfate, bisacodyl, glycerin 99.5% **AND** magnesium citrate **AND** sodium chloride 0.9%, hydrocortisone, hydrOXYzine HCl, lactulose, magnesium hydroxide 400 mg/5 ml, ondansetron, oxyCODONE, oxyCODONE-acetaminophen, promethazine (PHENERGAN) IVPB, simethicone, sodium chloride 0.9%, sodium chloride 0.9%    Review of patient's allergies indicates:   Allergen Reactions    Adhesive Blisters     Reaction to area in chest and up only    Codeine Itching     Objective:     Vital Signs (Most Recent):  Temp: 97.5 °F (36.4 °C) (09/25/19 0434)  Pulse: 72 (09/25/19 1119)  Resp: 18 (09/25/19 0800)  BP: (!) 84/0 (09/25/19 0800)  SpO2: (unable to ) (09/25/19 1113) Vital Signs (24h Range):  Temp:  [97.5 °F (36.4 °C)-98.3 °F (36.8 °C)] 97.5 °F (36.4 °C)  Pulse:  [47-96] 72  Resp:  [15-18] 18  SpO2:  [93 %-99 %] 98 %  BP: ()/(0-74) 84/0     Patient Vitals for the past 72 hrs (Last 3 readings):   Weight    09/25/19 0800 107 kg (236 lb)   09/25/19 0500 107.3 kg (236 lb 8.9 oz)   09/24/19 0500 108 kg (238 lb 1.6 oz)     Body mass index is 36.96 kg/m².      Intake/Output Summary (Last 24 hours) at 9/25/2019 1501  Last data filed at 9/25/2019 0600  Gross per 24 hour   Intake 1080 ml   Output 900 ml   Net 180 ml        Telemetry: Aflutter.     Physical Exam   Constitutional: She is oriented to person, place, and time. She appears well-developed and well-nourished.   HENT:   Head: Normocephalic.   Eyes: Pupils are equal, round, and reactive to light.   Neck: Normal range of motion. Neck supple.   Cardiovascular: Normal rate and regular rhythm.   VAD hum   Pulmonary/Chest: Effort normal and breath sounds normal.   Abdominal: Soft. Bowel sounds are normal.   Musculoskeletal: Normal range of motion.   Neurological: She is alert and oriented to person, place, and time.   Skin: Skin is warm and dry.   Psychiatric: She has a normal mood and affect. Her behavior is normal.   Nursing note and vitals reviewed.    Significant Labs:  CBC:  Recent Labs   Lab 09/22/19  1039 09/23/19  0458 09/25/19  0335   WBC 13.43* 11.12  11.12 8.51   RBC 3.66* 3.60*  3.60* 3.42*   HGB 10.1* 10.4*  10.4* 9.6*   HCT 33.5* 33.0*  33.0* 31.5*   * 563*  563* 530*   MCV 92 92  92 92   MCH 27.6 28.9  28.9 28.1   MCHC 30.1* 31.5*  31.5* 30.5*     BNP:  Recent Labs   Lab 09/23/19  0458 09/25/19  0335   * 667*     CMP:  Recent Labs   Lab 09/23/19  0458 09/24/19  0358 09/25/19  0335   * 108 138*   CALCIUM 9.3 9.0 8.9   ALBUMIN 2.7* 2.6* 2.6*   PROT 6.7 6.7 6.5   * 132* 134*   K 4.6 4.5 4.4   CO2 32* 33* 33*   CL 88* 88* 89*   BUN 33* 31* 29*   CREATININE 1.6* 1.6* 1.8*   ALKPHOS 88 84 81   ALT 29 24 21   AST 42* 30 26   BILITOT 0.6 0.5 0.5      Coagulation:   Recent Labs   Lab 09/23/19  0458 09/23/19  1353 09/24/19  0358 09/25/19  0335   INR 1.5*  --  2.0* 2.3*   APTT 33.9*  33.9* 45.6* 39.6*  39.6* 35.5*     LDH:  Recent  Labs   Lab 09/23/19  0458 09/24/19  0358 09/25/19  0335   * 307* 287*     Microbiology:  Microbiology Results (last 7 days)     ** No results found for the last 168 hours. **        I have reviewed all pertinent labs within the past 24 hours.    Estimated Creatinine Clearance: 47.6 mL/min (A) (based on SCr of 1.8 mg/dL (H)).    Diagnostic Results:  I have reviewed all pertinent imaging results/findings within the past 24 hours.    Assessment and Plan:     No notes on file    * LVAD (left ventricular assist device) present  -HeartMate 3 Implanted 9/10/19 as DT.  -HTS Primary.  -Implanted by Dr. Walden  -INR therapeutic. Goal INR 2.0-3.0. Continue coumadin.  -Antiplatelets:  mg.  -LDH is stable overall today. Will continue to monitor daily.  -Speed set at 5200  -Not listed for OHTx.  - weaned off.   -Echo 9/18: LVIDD 5.69, TAPSE 0.86, AV does not open. The ventricular septum is at midline. Repeating today  -Continue Pt/OT/VAD education.    Procedure: Device Interrogation Including analysis of device parameters  Current Settings: Ventricular Assist Device  Review of device function is stable    TXP LVAD INTERROGATIONS 9/25/2019 9/24/2019 9/24/2019 9/24/2019 9/24/2019 9/23/2019 9/23/2019   Type HeartMate3 HeartMate3 HeartMate3 HeartMate3 HeartMate3 HeartMate3 HeartMate3   Flow 3.8 4.0 4.0 3.5 3.8 4.1 3.8   Speed 5200 5200 5200 5200 5200 5200 5250   PI 3.3 3.7 4.0 4.0 4.0 3.4 3.8   Power (Orellana) 3.5 3.6 3.7 3.5 3.5 3.6 3.6   LSL 4800 4800 4800 4800 - - -   Pulsatility - - - No Pulse No Pulse No Pulse No Pulse       Pleural effusion  -Bilateral pleural effusions present, L>R.  -IR consulted for thoracentesis on left and planned for tomorrow    Acute on chronic combined systolic and diastolic heart failure  NICM EF 20% s/p LVAD 9/10  RHC: done on admit 9/4/19 RA: 20/ 20/ 18 RV: 60/ 8/ 18 PA: 60/ 32/ 45 PWP: 43/ 70/ 40 . Cardiac output was 2.27 by Fitz. Cardiac index is 1.04 L/min/m2. O2 Sat: PA 34%. AO 95%  PVR 2.2 PALMER    -see s/p LVAD      Enlarged thyroid  Ultrasound done and showed enlarged multinodular thyroid which warrants surveillance in one year.   - TSH and fT4 wnl    Abnormal CT scan  -focal opacity found at right base.  DDx: atelectasis.  Recommending repeat CT in 3 months    ICD (implantable cardioverter-defibrillator) in place  -Medtronic CRT-D.  -Patient began v-pacing at 59 and 9/14/19 began having phrenic nerve stimulation. Amiodarone held and EP consulted, device settings changed and PNS ceased.     History of ventricular tachycardia  -In the setting of hypokalemia  -Monitor electrolytes closely    CKD (chronic kidney disease)  -Creatinine at baseline  - Avoid nephrotoxic agents    Constipation  -Continue Senna/Colace/miralax.  -Continue prn milk of Mag. Will add lactulose as well.      Atrial flutter  -Continue Amio/anticoagulation.   -EP Cx. S/p NADINE/DCCV yesterday      ZAC Kelly  Heart Transplant  [facility]

## 2019-09-25 NOTE — PROGRESS NOTES
09/25/2019  Terra Porter    Current provider:  Jolene Lua MD      I, Terra Porter, rounded on Deborah Navas to ensure all mechanical assist device settings (IABP or VAD) were appropriate and all parameters were within limits.  I was able to ensure all back up equipment was present, the staff had no issues, and the Perfusion Department daily rounding was complete.    8:43 AM

## 2019-09-25 NOTE — PLAN OF CARE
Pt free of falls/trauma/injuries.  Denies c/o SOB; O2Sats remain stable on room air..  Incentive spirometry encouraged throughout shift.  Incisional pain managed with PO analgesics.  Generalized skin remains CDI; 2+  edema noted to BLEs.  TEDs pt has refused stating that they are to uncomfortable.  Incisions remain FARRAH, CDI.  Pt being diuresed with  PO lasix_; diuresing well.   Wt remains stable. .  Electrolytes replaced as ordered.  PT/OT following; pt able to ambulate in hallway with 1-person assist. VAD dressing using sterile tench by the RN. Site does have small amount of drainage that is draining that is thin and semi clear in color.  No odor noted and redness or inflammation noted. VAD coordinator did stop and take a look and gave some ideas for placing anchor that continues to come off. Pt was also given her home power unit that she will need to start using at night while she is here. Pt is in much better spirits today and seems to more motivated. Pt denies any pain.  Plan to continue with post-op care.  Pt tolerating plan of care.

## 2019-09-25 NOTE — PT/OT/SLP PROGRESS
Physical Therapy Treatment    Patient Name:  Deborah Navas   MRN:  [id number]    Recommendations:     Discharge Recommendations:  home health PT   Discharge Equipment Recommendations: none   Barriers to discharge: None    Assessment:     Deborah Navas is a 48 y.o. female admitted with a medical diagnosis of LVAD (left ventricular assist device) present.  She presents with the following impairments/functional limitations:  weakness, impaired endurance, impaired functional mobilty, gait instability, impaired balance, impaired cardiopulmonary response to activity. Pt tolerated activity with improved mobility reflected by increased distance ambulated. Pt able to ambulate 100 ft, followed by 120 ft with chair follow with contact guard assistance. Pt demo'd improved balance, still experienced one episode of dizziness which resolved with seated rest. Pt would continue to benefit from acute skilled therapy intervention to address deficits and progress toward prior level of function.       Rehab Prognosis: Good; patient would benefit from acute skilled PT services to address these deficits and reach maximum level of function.    Recent Surgery: Procedure(s) (LRB):  CARDIOVERSION (N/A)  ECHOCARDIOGRAM,TRANSESOPHAGEAL (N/A) 1 Day Post-Op    Plan:     During this hospitalization, patient to be seen 5 x/week to address the identified rehab impairments via gait training, therapeutic activities, therapeutic exercises, neuromuscular re-education and progress toward the following goals:    · Plan of Care Expires:  10/10/19    Subjective     Chief Complaint: Pt c/o fatigue  Patient/Family Comments/goals: to get better and return home   Pain/Comfort:  · Pain Rating 1: 0/10  · Pain Rating Post-Intervention 1: 0/10      Objective:     Communicated with RN prior to session.  Patient found up in chair with telemetry, LVAD upon PT entry to room.     General Precautions: Standard, fall, LVAD, sternal   Orthopedic  Precautions:N/A   Braces: N/A     Functional Mobility:  · Transfers:     · Sit to Stand: 3x from chair with stand by assistance with no AD  · Gait: Pt ambulated 100 ft, then 120 feet with no AD and contact guard assistance with UL HHA. Pt demo'd improved stride length, normal FIDE, excessive lateral sway. Pt with no LOB, no SOB, pt experienced dizziness following first bout, required seated rest break in which dizziness resolved.       AM-PAC 6 CLICK MOBILITY  Turning over in bed (including adjusting bedclothes, sheets and blankets)?: 3  Sitting down on and standing up from a chair with arms (e.g., wheelchair, bedside commode, etc.): 3  Moving from lying on back to sitting on the side of the bed?: 3  Moving to and from a bed to a chair (including a wheelchair)?: 3  Need to walk in hospital room?: 3  Climbing 3-5 steps with a railing?: 3  Basic Mobility Total Score: 18       Therapeutic Activities and Exercises:   Pt educated on role of PT/POC. Pt verbalized understanding.   Pt ambulated to sink, stood at sink and brushed teeth with supervision for dynamic standing balance. Pt with no LOB, no dizziness.   Pt encouraged to ambulate daily with assistance/supervision from nursing/therapy. Pt agreeable.  Pt encouraged to only perform OOB mobility with assistance from nursing/therapy. Pt agreeable.   LVAD to battery power with contents in consolidation bag upon arrival. Emergency bag present, no alarms sounded.     Patient left up in chair with all lines intact, call button in reach and RN notified..    GOALS:   Multidisciplinary Problems     Physical Therapy Goals        Problem: Physical Therapy Goal    Goal Priority Disciplines Outcome Goal Variances Interventions   Physical Therapy Goal     PT, PT/OT Ongoing, Progressing     Description:  Goals to be met by: 10/1/2019    Patient will increase functional independence with mobility by performin. Supine to sit with Contact Guard Assistance with HOB flat - not  met  2. Sit to stand transfer with Supervision - not met  3. Gait  x 200 feet with Supervision  - not met  4. Ascend/descend 2 stair with Contact Guard Assistance - not met                         Time Tracking:     PT Received On: 09/25/19  PT Start Time: 1327     PT Stop Time: 1350  PT Total Time (min): 23 min     Billable Minutes: Gait Training 23 mins     Treatment Type: Treatment  PT/PTA: PT     PTA Visit Number: 0     Consuelo Toledo, PT  09/25/2019

## 2019-09-25 NOTE — SUBJECTIVE & OBJECTIVE
Interval History: Patient is s/p DCCV.  She reports feeling better and dizziness has improved.  Has significant pleural effusion: IR consulted for thoracentesis.  Echo done this am      Scheduled Meds:   acetaminophen  1,000 mg Oral Q8H    aspirin  325 mg Oral Daily    furosemide  40 mg Oral BID    gabapentin  400 mg Oral BID    mirtazapine  15 mg Oral QHS    oxyCODONE  20 mg Oral Q12H    pantoprazole  40 mg Oral Daily    polyethylene glycol  17 g Oral Daily    ramelteon  8 mg Oral QHS    senna-docusate 8.6-50 mg  2 tablet Oral BID    simethicone  1 tablet Oral QID (PC + HS)    venlafaxine  150 mg Oral Daily    warfarin  3 mg Oral Daily     PRN Meds:albuterol sulfate, bisacodyl, glycerin 99.5% **AND** magnesium citrate **AND** sodium chloride 0.9%, hydrocortisone, hydrOXYzine HCl, lactulose, magnesium hydroxide 400 mg/5 ml, ondansetron, oxyCODONE, oxyCODONE-acetaminophen, promethazine (PHENERGAN) IVPB, simethicone, sodium chloride 0.9%, sodium chloride 0.9%    Review of patient's allergies indicates:   Allergen Reactions    Adhesive Blisters     Reaction to area in chest and up only    Codeine Itching     Objective:     Vital Signs (Most Recent):  Temp: 97.5 °F (36.4 °C) (09/25/19 0434)  Pulse: 72 (09/25/19 1119)  Resp: 18 (09/25/19 0800)  BP: (!) 84/0 (09/25/19 0800)  SpO2: (unable to ) (09/25/19 1113) Vital Signs (24h Range):  Temp:  [97.5 °F (36.4 °C)-98.3 °F (36.8 °C)] 97.5 °F (36.4 °C)  Pulse:  [47-96] 72  Resp:  [15-18] 18  SpO2:  [93 %-99 %] 98 %  BP: ()/(0-74) 84/0     Patient Vitals for the past 72 hrs (Last 3 readings):   Weight   09/25/19 0800 107 kg (236 lb)   09/25/19 0500 107.3 kg (236 lb 8.9 oz)   09/24/19 0500 108 kg (238 lb 1.6 oz)     Body mass index is 36.96 kg/m².      Intake/Output Summary (Last 24 hours) at 9/25/2019 1501  Last data filed at 9/25/2019 0600  Gross per 24 hour   Intake 1080 ml   Output 900 ml   Net 180 ml        Telemetry: Aflutter.     Physical Exam    Constitutional: She is oriented to person, place, and time. She appears well-developed and well-nourished.   HENT:   Head: Normocephalic.   Eyes: Pupils are equal, round, and reactive to light.   Neck: Normal range of motion. Neck supple.   Cardiovascular: Normal rate and regular rhythm.   VAD hum   Pulmonary/Chest: Effort normal and breath sounds normal.   Abdominal: Soft. Bowel sounds are normal.   Musculoskeletal: Normal range of motion.   Neurological: She is alert and oriented to person, place, and time.   Skin: Skin is warm and dry.   Psychiatric: She has a normal mood and affect. Her behavior is normal.   Nursing note and vitals reviewed.    Significant Labs:  CBC:  Recent Labs   Lab 09/22/19  1039 09/23/19 0458 09/25/19  0335   WBC 13.43* 11.12  11.12 8.51   RBC 3.66* 3.60*  3.60* 3.42*   HGB 10.1* 10.4*  10.4* 9.6*   HCT 33.5* 33.0*  33.0* 31.5*   * 563*  563* 530*   MCV 92 92  92 92   MCH 27.6 28.9  28.9 28.1   MCHC 30.1* 31.5*  31.5* 30.5*     BNP:  Recent Labs   Lab 09/23/19 0458 09/25/19  0335   * 667*     CMP:  Recent Labs   Lab 09/23/19 0458 09/24/19 0358 09/25/19  0335   * 108 138*   CALCIUM 9.3 9.0 8.9   ALBUMIN 2.7* 2.6* 2.6*   PROT 6.7 6.7 6.5   * 132* 134*   K 4.6 4.5 4.4   CO2 32* 33* 33*   CL 88* 88* 89*   BUN 33* 31* 29*   CREATININE 1.6* 1.6* 1.8*   ALKPHOS 88 84 81   ALT 29 24 21   AST 42* 30 26   BILITOT 0.6 0.5 0.5      Coagulation:   Recent Labs   Lab 09/23/19  0458 09/23/19  1353 09/24/19 0358 09/25/19  0335   INR 1.5*  --  2.0* 2.3*   APTT 33.9*  33.9* 45.6* 39.6*  39.6* 35.5*     LDH:  Recent Labs   Lab 09/23/19  0458 09/24/19  0358 09/25/19  0335   * 307* 287*     Microbiology:  Microbiology Results (last 7 days)     ** No results found for the last 168 hours. **        I have reviewed all pertinent labs within the past 24 hours.    Estimated Creatinine Clearance: 47.6 mL/min (A) (based on SCr of 1.8 mg/dL (H)).    Diagnostic  Results:  I have reviewed all pertinent imaging results/findings within the past 24 hours.

## 2019-09-25 NOTE — ANESTHESIA POSTPROCEDURE EVALUATION
Anesthesia Post Evaluation    Patient: Deborah Navas    Procedure(s) Performed: Procedure(s) (LRB):  CARDIOVERSION (N/A)  ECHOCARDIOGRAM,TRANSESOPHAGEAL (N/A)    Final Anesthesia Type: general (Natural airway)  Patient location during evaluation: PACU  Patient participation: Yes- Able to Participate  Level of consciousness: awake and alert  Post-procedure vital signs: reviewed and stable  Pain management: adequate  Airway patency: patent  PONV status at discharge: No PONV  Anesthetic complications: no      Cardiovascular status: hemodynamically stable  Respiratory status: unassisted  Hydration status: euvolemic  Follow-up not needed.          Vitals Value Taken Time   BP 88/0 9/25/2019  5:20 AM   Temp 36.4 °C (97.5 °F) 9/25/2019  4:34 AM   Pulse 84 9/25/2019  4:34 AM   Resp 16 9/25/2019  4:34 AM   SpO2 93 % 9/25/2019  5:20 AM         No case tracking events are documented in the log.      Pain/Zaria Score: Pain Rating Prior to Med Admin: 3 (9/25/2019  5:15 AM)  Zaria Score: 10 (9/24/2019  6:01 PM)

## 2019-09-26 LAB
ALBUMIN SERPL BCP-MCNC: 2.6 G/DL (ref 3.5–5.2)
ALP SERPL-CCNC: 90 U/L (ref 55–135)
ALT SERPL W/O P-5'-P-CCNC: 20 U/L (ref 10–44)
ANION GAP SERPL CALC-SCNC: 12 MMOL/L (ref 8–16)
APTT BLDCRRT: 38.4 SEC (ref 21–32)
AST SERPL-CCNC: 25 U/L (ref 10–40)
BASOPHILS # BLD AUTO: 0.07 K/UL (ref 0–0.2)
BASOPHILS NFR BLD: 0.8 % (ref 0–1.9)
BILIRUB SERPL-MCNC: 0.5 MG/DL (ref 0.1–1)
BUN SERPL-MCNC: 28 MG/DL (ref 6–20)
CALCIUM SERPL-MCNC: 8.9 MG/DL (ref 8.7–10.5)
CHLORIDE SERPL-SCNC: 89 MMOL/L (ref 95–110)
CO2 SERPL-SCNC: 32 MMOL/L (ref 23–29)
CREAT SERPL-MCNC: 2 MG/DL (ref 0.5–1.4)
DIFFERENTIAL METHOD: ABNORMAL
EOSINOPHIL # BLD AUTO: 0.4 K/UL (ref 0–0.5)
EOSINOPHIL NFR BLD: 4.2 % (ref 0–8)
ERYTHROCYTE [DISTWIDTH] IN BLOOD BY AUTOMATED COUNT: 15.9 % (ref 11.5–14.5)
EST. GFR  (AFRICAN AMERICAN): 33.1 ML/MIN/1.73 M^2
EST. GFR  (NON AFRICAN AMERICAN): 28.7 ML/MIN/1.73 M^2
GLUCOSE SERPL-MCNC: 109 MG/DL (ref 70–110)
HCT VFR BLD AUTO: 30.6 % (ref 37–48.5)
HGB BLD-MCNC: 9.5 G/DL (ref 12–16)
IMM GRANULOCYTES # BLD AUTO: 0.08 K/UL (ref 0–0.04)
IMM GRANULOCYTES NFR BLD AUTO: 0.9 % (ref 0–0.5)
INR PPP: 3.1 (ref 0.8–1.2)
LDH SERPL L TO P-CCNC: 306 U/L (ref 110–260)
LYMPHOCYTES # BLD AUTO: 1.1 K/UL (ref 1–4.8)
LYMPHOCYTES NFR BLD: 11.7 % (ref 18–48)
MAGNESIUM SERPL-MCNC: 2.4 MG/DL (ref 1.6–2.6)
MCH RBC QN AUTO: 27.8 PG (ref 27–31)
MCHC RBC AUTO-ENTMCNC: 31 G/DL (ref 32–36)
MCV RBC AUTO: 90 FL (ref 82–98)
MONOCYTES # BLD AUTO: 0.7 K/UL (ref 0.3–1)
MONOCYTES NFR BLD: 7.9 % (ref 4–15)
NEUTROPHILS # BLD AUTO: 6.9 K/UL (ref 1.8–7.7)
NEUTROPHILS NFR BLD: 74.5 % (ref 38–73)
NRBC BLD-RTO: 0 /100 WBC
PLATELET # BLD AUTO: 451 K/UL (ref 150–350)
PMV BLD AUTO: 9.1 FL (ref 9.2–12.9)
POTASSIUM SERPL-SCNC: 4.4 MMOL/L (ref 3.5–5.1)
PROT SERPL-MCNC: 6.6 G/DL (ref 6–8.4)
PROTHROMBIN TIME: 30.1 SEC (ref 9–12.5)
RBC # BLD AUTO: 3.42 M/UL (ref 4–5.4)
SODIUM SERPL-SCNC: 133 MMOL/L (ref 136–145)
WBC # BLD AUTO: 9.28 K/UL (ref 3.9–12.7)

## 2019-09-26 PROCEDURE — 25000003 PHARM REV CODE 250: Mod: NTX | Performed by: STUDENT IN AN ORGANIZED HEALTH CARE EDUCATION/TRAINING PROGRAM

## 2019-09-26 PROCEDURE — 99232 SBSQ HOSP IP/OBS MODERATE 35: CPT | Mod: NTX,,, | Performed by: INTERNAL MEDICINE

## 2019-09-26 PROCEDURE — 25000003 PHARM REV CODE 250: Mod: NTX | Performed by: INTERNAL MEDICINE

## 2019-09-26 PROCEDURE — 85025 COMPLETE CBC W/AUTO DIFF WBC: CPT | Mod: NTX

## 2019-09-26 PROCEDURE — 93750 PR INTERROGATE VENT ASSIST DEV, IN PERSON, W PHYSICIAN ANALYSIS: ICD-10-PCS | Mod: NTX,,, | Performed by: INTERNAL MEDICINE

## 2019-09-26 PROCEDURE — 83615 LACTATE (LD) (LDH) ENZYME: CPT | Mod: NTX

## 2019-09-26 PROCEDURE — 85610 PROTHROMBIN TIME: CPT | Mod: NTX

## 2019-09-26 PROCEDURE — 85730 THROMBOPLASTIN TIME PARTIAL: CPT | Mod: NTX

## 2019-09-26 PROCEDURE — 83735 ASSAY OF MAGNESIUM: CPT | Mod: NTX

## 2019-09-26 PROCEDURE — 80053 COMPREHEN METABOLIC PANEL: CPT | Mod: NTX

## 2019-09-26 PROCEDURE — 36415 COLL VENOUS BLD VENIPUNCTURE: CPT | Mod: NTX

## 2019-09-26 PROCEDURE — 93750 INTERROGATION VAD IN PERSON: CPT | Mod: NTX,,, | Performed by: INTERNAL MEDICINE

## 2019-09-26 PROCEDURE — 97116 GAIT TRAINING THERAPY: CPT | Mod: NTX

## 2019-09-26 PROCEDURE — 99232 PR SUBSEQUENT HOSPITAL CARE,LEVL II: ICD-10-PCS | Mod: NTX,,, | Performed by: INTERNAL MEDICINE

## 2019-09-26 PROCEDURE — 27000248 HC VAD-ADDITIONAL DAY: Mod: NTX

## 2019-09-26 PROCEDURE — 20600001 HC STEP DOWN PRIVATE ROOM: Mod: NTX

## 2019-09-26 PROCEDURE — 97530 THERAPEUTIC ACTIVITIES: CPT | Mod: NTX

## 2019-09-26 PROCEDURE — 25000003 PHARM REV CODE 250: Mod: NTX | Performed by: NURSE PRACTITIONER

## 2019-09-26 RX ADMIN — ACETAMINOPHEN 1000 MG: 500 TABLET ORAL at 05:09

## 2019-09-26 RX ADMIN — SIMETHICONE CHEW TAB 80 MG 80 MG: 80 TABLET ORAL at 06:09

## 2019-09-26 RX ADMIN — BISACODYL 10 MG RECTAL SUPPOSITORY 10 MG: at 08:09

## 2019-09-26 RX ADMIN — OXYCODONE HYDROCHLORIDE 20 MG: 20 TABLET, FILM COATED, EXTENDED RELEASE ORAL at 08:09

## 2019-09-26 RX ADMIN — MIRTAZAPINE 15 MG: 15 TABLET, FILM COATED ORAL at 08:09

## 2019-09-26 RX ADMIN — PANTOPRAZOLE SODIUM 40 MG: 40 TABLET, DELAYED RELEASE ORAL at 08:09

## 2019-09-26 RX ADMIN — GABAPENTIN 400 MG: 100 CAPSULE ORAL at 08:09

## 2019-09-26 RX ADMIN — SIMETHICONE CHEW TAB 80 MG 80 MG: 80 TABLET ORAL at 08:09

## 2019-09-26 RX ADMIN — POLYETHYLENE GLYCOL 3350 17 G: 17 POWDER, FOR SOLUTION ORAL at 08:09

## 2019-09-26 RX ADMIN — SIMETHICONE CHEW TAB 80 MG 80 MG: 80 TABLET ORAL at 02:09

## 2019-09-26 RX ADMIN — ACETAMINOPHEN 1000 MG: 500 TABLET ORAL at 02:09

## 2019-09-26 RX ADMIN — RAMELTEON 8 MG: 8 TABLET ORAL at 08:09

## 2019-09-26 RX ADMIN — VENLAFAXINE 150 MG: 37.5 TABLET ORAL at 08:09

## 2019-09-26 RX ADMIN — SENNOSIDES AND DOCUSATE SODIUM 2 TABLET: 8.6; 5 TABLET ORAL at 08:09

## 2019-09-26 RX ADMIN — ASPIRIN 325 MG: 325 TABLET, COATED ORAL at 08:09

## 2019-09-26 RX ADMIN — MAGNESIUM CITRATE 296 ML: 1.75 LIQUID ORAL at 08:09

## 2019-09-26 NOTE — PROGRESS NOTES
[facility]  Heart Transplant  Progress Note    Patient Name: Deborah Navas  MRN: [id number]  Admission Date: 9/4/2019  Hospital Length of Stay: 22 days  Attending Physician: Jolene Lua MD  Primary Care Provider: Primary Doctor No  Principal Problem:LVAD (left ventricular assist device) present    Subjective:     Interval History: Patient reports her dizziness has improved.  CXR with worsening left pleural effusion (IR consulted and planning for thoracentesis with drain placement) tomorrow.  Echo repeated yesterday: EF:20%, LVEDD: 5.3cm.  Patient is net negative 1.5L over the last 24 hours.  Creat up and echo reviewed; so Lasix stopped this morning.     Scheduled Meds:   acetaminophen  1,000 mg Oral Q8H    aspirin  325 mg Oral Daily    gabapentin  400 mg Oral BID    mirtazapine  15 mg Oral QHS    oxyCODONE  20 mg Oral Q12H    pantoprazole  40 mg Oral Daily    polyethylene glycol  17 g Oral Daily    ramelteon  8 mg Oral QHS    senna-docusate 8.6-50 mg  2 tablet Oral BID    simethicone  1 tablet Oral QID (PC + HS)    venlafaxine  150 mg Oral Daily     PRN Meds:albuterol sulfate, bisacodyl, glycerin 99.5% **AND** magnesium citrate **AND** sodium chloride 0.9%, hydrocortisone, hydrOXYzine HCl, lactulose, magnesium hydroxide 400 mg/5 ml, ondansetron, oxyCODONE, oxyCODONE-acetaminophen, promethazine (PHENERGAN) IVPB, simethicone, sodium chloride 0.9%    Review of patient's allergies indicates:   Allergen Reactions    Adhesive Blisters     Reaction to area in chest and up only    Codeine Itching     Objective:     Vital Signs (Most Recent):  Temp: 97.5 °F (36.4 °C) (09/26/19 0720)  Pulse: 85 (09/26/19 1141)  Resp: 16 (09/26/19 0720)  BP: (!) 82/0 (09/26/19 0720)  SpO2: 96 % (09/26/19 0720) Vital Signs (24h Range):  Temp:  [97.5 °F (36.4 °C)-98.6 °F (37 °C)] 97.5 °F (36.4 °C)  Pulse:  [] 85  Resp:  [16-18] 16  SpO2:  [91 %-98 %] 96 %  BP: ()/(0-71) 82/0     Patient  Vitals for the past 72 hrs (Last 3 readings):   Weight   09/26/19 0500 107.9 kg (237 lb 12.3 oz)   09/25/19 0800 107 kg (236 lb)   09/25/19 0500 107.3 kg (236 lb 8.9 oz)     Body mass index is 37.24 kg/m².      Intake/Output Summary (Last 24 hours) at 9/26/2019 1145  Last data filed at 9/26/2019 0900  Gross per 24 hour   Intake 480 ml   Output 1750 ml   Net -1270 ml        Telemetry: Aflutter.     Physical Exam   Constitutional: She is oriented to person, place, and time. She appears well-developed and well-nourished.   HENT:   Head: Normocephalic.   Eyes: Pupils are equal, round, and reactive to light.   Neck: Normal range of motion. Neck supple.   Cardiovascular: Normal rate and regular rhythm.   VAD hum   Pulmonary/Chest: Effort normal and breath sounds normal.   Abdominal: Soft. Bowel sounds are normal.   Musculoskeletal: Normal range of motion.   Neurological: She is alert and oriented to person, place, and time.   Skin: Skin is warm and dry.   Psychiatric: She has a normal mood and affect. Her behavior is normal.   Nursing note and vitals reviewed.    Significant Labs:  CBC:  Recent Labs   Lab 09/23/19 0458 09/25/19 0335 09/26/19 0454   WBC 11.12  11.12 8.51 9.28   RBC 3.60*  3.60* 3.42* 3.42*   HGB 10.4*  10.4* 9.6* 9.5*   HCT 33.0*  33.0* 31.5* 30.6*   *  563* 530* 451*   MCV 92  92 92 90   MCH 28.9  28.9 28.1 27.8   MCHC 31.5*  31.5* 30.5* 31.0*     BNP:  Recent Labs   Lab 09/23/19 0458 09/25/19 0335   * 667*     CMP:  Recent Labs   Lab 09/24/19  0358 09/25/19 0335 09/26/19 0454    138* 109   CALCIUM 9.0 8.9 8.9   ALBUMIN 2.6* 2.6* 2.6*   PROT 6.7 6.5 6.6   * 134* 133*   K 4.5 4.4 4.4   CO2 33* 33* 32*   CL 88* 89* 89*   BUN 31* 29* 28*   CREATININE 1.6* 1.8* 2.0*   ALKPHOS 84 81 90   ALT 24 21 20   AST 30 26 25   BILITOT 0.5 0.5 0.5      Coagulation:   Recent Labs   Lab 09/24/19  0358 09/25/19  0335 09/26/19  0454   INR 2.0* 2.3* 3.1*   APTT 39.6*  39.6* 35.5*  38.4*     LDH:  Recent Labs   Lab 09/24/19  0358 09/25/19  0335 09/26/19  0454   * 287* 306*     Microbiology:  Microbiology Results (last 7 days)     ** No results found for the last 168 hours. **        I have reviewed all pertinent labs within the past 24 hours.    Estimated Creatinine Clearance: 43 mL/min (A) (based on SCr of 2 mg/dL (H)).    Diagnostic Results:  I have reviewed all pertinent imaging results/findings within the past 24 hours.    Assessment and Plan:     No notes on file    * LVAD (left ventricular assist device) present  -HeartMate 3 Implanted 9/10/19 as DT.  -HTS Primary.  -Implanted by Dr. Walden  -INR therapeutic. Goal INR 2.0-3.0. Supratherapeutic today; will hold Coumadin in anticipation of thoracentesis   -Antiplatelets:  mg.  -LDH is stable overall today. Will continue to monitor daily.  -Speed set at 5200  -Not listed for OHTx.  - weaned off.   -Echo 9/18: LVIDD 5.69, TAPSE 0.86, AV does not open. The ventricular septum is at midline. Repeating today  -Continue Pt/OT/VAD education.    Procedure: Device Interrogation Including analysis of device parameters  Current Settings: Ventricular Assist Device  Review of device function is stable    TXP LVAD INTERROGATIONS 9/26/2019 9/26/2019 9/25/2019 9/25/2019 9/25/2019 9/25/2019 9/25/2019   Type HeartMate3 HeartMate3 HeartMate3 HeartMate3 HeartMate3 HeartMate3 HeartMate3   Flow 4.1 4.1 4.2 3.8 3.8 3.8 3.8   Speed 5200 5200 5200 5200 5200 5200 5200   PI 2.8 3.6 2.9 3.3 3.3 3.3 3.3   Power ([place]) 3.6 3.6 3.7 3.5 3.5 3.5 3.5   LSL 4800 4800 4800 4800 4800 4800 4800   Pulsatility No Pulse - - - - - -       Pleural effusion  -Bilateral pleural effusions present, L>R.  -IR consulted for thoracentesis on left and planned for tomorrow with drain placement     Acute on chronic combined systolic and diastolic heart failure  NICM EF 20% s/p LVAD 9/10  RHC: done on admit 9/4/19 RA: 20/ 20/ 18 RV: 60/ 8/ 18 PA: 60/ 32/ 45 PWP: 43/ 70/ 40 .  Cardiac output was 2.27 by Fitz. Cardiac index is 1.04 L/min/m2. O2 Sat: PA 34%. AO 95% PVR 2.2 PALMER    -see s/p LVAD      Enlarged thyroid  Ultrasound done and showed enlarged multinodular thyroid which warrants surveillance in one year.   - TSH and fT4 wnl    Abnormal CT scan  -focal opacity found at right base.  DDx: atelectasis.  Recommending repeat CT in 3 months    ICD (implantable cardioverter-defibrillator) in place  -Medtronic CRT-D.  -Patient began v-pacing at 59 and 9/14/19 began having phrenic nerve stimulation. Amiodarone held and EP consulted, device settings changed and PNS ceased.     History of ventricular tachycardia  -In the setting of hypokalemia  -Monitor electrolytes closely    CKD (chronic kidney disease)  -Creatinine at baseline  - Avoid nephrotoxic agents    Constipation  -Continue Senna/Colace/miralax.  -Continue prn milk of Mag. Will add lactulose as well.      Atrial flutter  -Continue Amio/anticoagulation.   -EP Cx. S/p NADINE/DCCV yesterday        ZAC Kelly  Heart Transplant  [facility]

## 2019-09-26 NOTE — PROGRESS NOTES
"UPDATE    SW to pt's room for update today.  Pt is s/p LVAD implant on 9/10.  Pt presents as lying in bed, aao x4, pleasant, calm, cooperative, and asking and answering questions appropriately.  Pt reports feeling well today.  Pt states doctors had planned to place drain today for pleural effusion, but it has been postponed to tomorrow.  Pt states she knows she will likely be in hospital until Monday now, but states she thinks it will be good to have a few extra days here so that she can feel "more steady" when she gets home.  Pt states she will d/c to her sister's home, but sister works during the day.    SW advised pt that HH arrangements have been confirmed with [facility].  Pt verbalizes understanding.  SW updated Gregory at [facility] on change in pt's dispo.    Pt reports she is feeling "much better" mentally.  Pt states first 10 days or so after surgery were "very hard," but states she is in much better spirits now.  Pt reports her mother is coming to hospital later today and will stay with pt over weekend.  Pt reports coping adequately at this time, and denies any needs or concerns to SW.  SW providing ongoing psychosocial and counseling support, education, resources, assistance, and discharge planning as indicated.  SW following and remains available.  "

## 2019-09-26 NOTE — PT/OT/SLP PROGRESS
Occupational Therapy   Treatment    Name: Deborah Navas  MRN: [id number]  Admitting Diagnosis:  LVAD (left ventricular assist device) present  2 Days Post-Op    Recommendations:     Discharge Recommendations: home with home health  Discharge Equipment Recommendations:  none  Barriers to discharge:  None    Assessment:     Deborah Navas is a 48 y.o. female with a medical diagnosis of LVAD (left ventricular assist device) present.  She presents with performance deficits affecting function are impaired endurance, impaired self care skills, impaired functional mobilty, gait instability, impaired balance, impaired cardiopulmonary response to activity, weakness. Pt would benefit from continued skilled acute OT services in order to maximize independence and safety with ADLs and functional mobility to ensure safe return to PLOF in the least restrictive environment.    Rehab Prognosis:  Good; patient would benefit from acute skilled OT services to address these deficits and reach maximum level of function.       Plan:     Patient to be seen 4 x/week to address the above listed problems via self-care/home management, therapeutic activities, therapeutic exercises  · Plan of Care Expires: 10/11/19  · Plan of Care Reviewed with: patient    Subjective     Pain/Comfort:  · Pain Rating 1: 0/10  · Pain Rating Post-Intervention 1: 0/10    Objective:     Communicated with: RN prior to session.  Patient found up in chair with telemetry, LVAD upon OT entry to room. Pt agreeable to therapy session.     General Precautions: Standard, fall, sternal, LVAD   Orthopedic Precautions:N/A   Braces: N/A     Occupational Performance:     Bed Mobility:    · Not performed, pt found sitting UIC.     Functional Mobility/Transfers:  · Patient completed Sit <> Stand Transfer with supervision  with  no assistive device   · Patient completed Toilet Transfer Step Transfer technique with independence with  no AD  · Functional Mobility: Pt  engaging in functional mobility to simulate household/community distances approx 200ft  with CGA using no AD in order to maximize functional activity tolerance and standing balance required for engagement in occupations of choice.   · Pt experienced multiple bouts of LOB requiring CGA for stability  · Emergency bag present     Activities of Daily Living:  · Grooming: independence pt completed hand hygiene standing at sink   · Toileting: independence for clothing management and hygiene    · UB dressing: (I)ce donning consolidation bag over shoulder     AMPAC 6 Click ADL: 21    Treatment & Education:  · LVAD   · Pt found with LVAD on battery power with LVAD components within consolidation bag.   · Pt completed AM self-test and documented all VAD numbers in daily log. Pt able to verbalize the purpose of self-test and logging numbers.   · Pt able to identify the following LVAD components independently: controller, power module, clips, batteries,  power cables, driveline, contents within emergency bag, consolidation pouch, and vest.  · Pt educated on importance of sleeping with LVAD on wall power and avoiding sleeping on drive line site or abdomen   · Pt able to verbalize transition from LVAD wall power <> battery power with independence.   · Pt left with LVAD on battery power  · Pt instructed on OT POC and therapy progression for next OT session.     Patient left up in chair with all lines intact, call button in reach and RN notifiedEducation:      GOALS:   Multidisciplinary Problems     Occupational Therapy Goals        Problem: Occupational Therapy Goal    Goal Priority Disciplines Outcome Interventions   Occupational Therapy Goal     OT, PT/OT Ongoing, Progressing    Description:  Goals to be met by: 10/11/19     Patient will increase functional independence with ADLs by performing:    UE Dressing with [place].  LE Dressing with [place].  Grooming while standing at sink with [place].  Toileting from  toilet with [place] for hygiene and clothing management. - MET 9/26  Toilet transfer to toilet with [place]. - MET 9/26  Increased functional strength to WFL for B UE.  Upper extremity exercise program x15 reps per handout, with independence.                        Time Tracking:     OT Date of Treatment: 09/26/19  OT Start Time: 1319  OT Stop Time: 1336  OT Total Time (min): 17 min    Billable Minutes:Therapeutic Activity 17    Johanna Oliva OT  9/26/2019

## 2019-09-26 NOTE — H&P
Inpatient Radiology Pre-procedure Note    History of Present Illness:  47 yo female with hx NICM: EF 20%, LVEDD: 6.8cm, V-fib reported in setting of hypokalemia with appropriate shock from ICD (on Amiodarone), HLP, being admitted from procedure room for management of cardiogenic shock and workup for advanced options.  Patient initally say Dr. Zarate in clinic 8/27/19 as initial consult for advanced options.  At time, she reported feeling fine with only complaint of fatigue which was at baseline    IR consulted for thoracentesis.    Admission H&P reviewed.    Past Medical History:   Diagnosis Date    Fractures     History of ventricular fibrillation 9/4/2019    History of ventricular tachycardia 9/4/2019    Hyperlipidemia     Migraine     Osteoarthritis      Past Surgical History:   Procedure Laterality Date    BACK SURGERY      2007    BONE GRAFT Left     from Left hip to Left FA    eardrum reconstruction  1980    ELBOW SURGERY Left [id number]    FOREARM FRACTURE SURGERY Bilateral [id number]    multiple surgeries    INSERTION OF GRAFT TO PERICARDIUM  9/10/2019    Procedure: INSERTION, GRAFT, PERICARDIUM;  Surgeon: Shar Walden MD;  Location: [facility] OR [address] [place] [address];  Service: Cardiovascular;;    INSERTION OF IMPLANTABLE CARDIOVERTER-DEFIBRILLATOR (ICD) GENERATOR WITH TWO EXISTING LEADS      INSERTION OF PACEMAKER Left     LEFT VENTRICULAR ASSIST DEVICE N/A 9/10/2019    Procedure: INSERTION-LEFT VENTRICULAR ASSIST DEVICE;  Surgeon: Shar Walden MD;  Location: [facility] OR [address] [place] [address];  Service: Cardiovascular;  Laterality: N/A;  DT HM3     LUMBAR FUSION  2007    L4-L5    RIGHT HEART CATHETERIZATION Right 9/4/2019    Procedure: INSERTION, CATHETER, RIGHT HEART;  Surgeon: Vi Bryant MD;  Location: [facility] CATH LAB;  Service: Cardiology;  Laterality: Right;    SINUS SURGERY Right 1994    with lymph nodes    STERNAL WOUND CLOSURE  9/10/2019    Procedure: CLOSURE, WOUND, STERNUM;  Surgeon: Shar Walden MD;   Location: [facility] OR [address] [place] [address];  Service: Cardiovascular;;    TEMPOROMANDIBULAR JOINT SURGERY Right 1988    TONSILLECTOMY      TYMPANOSTOMY TUBE PLACEMENT  1971- 1979    multiple tube placements       Review of Systems:   As documented in primary team H&P    Home Meds:   Prior to Admission medications    Medication Sig Start Date End Date Taking? Authorizing Provider   amiodarone (PACERONE) 200 MG Tab Take 200 mg by mouth 2 (two) times daily.  8/19/19  Yes Historical Provider, MD   aspirin (ECOTRIN) 81 MG EC tablet Take 81 mg by mouth.   Yes Historical Provider, MD   atorvastatin (LIPITOR) 40 MG tablet Take 40 mg by mouth once daily. 7/28/19  Yes Historical Provider, MD   cpm-phenyleph-acetaminophen (NOREL AD) 4- mg Tab Take by mouth 2 (two) times daily.   Yes Historical Provider, MD   docusate sodium (COLACE) 100 MG capsule Take 100 mg by mouth once daily.   Yes Historical Provider, MD   furosemide (LASIX) 40 MG tablet Take 40 mg by mouth. 6/17/19  Yes Historical Provider, MD   gabapentin (NEURONTIN) 800 MG tablet Take 400 mg by mouth 3 (three) times daily.    Yes Historical Provider, MD   loratadine (CLARITIN) 10 mg tablet Take 10 mg by mouth daily as needed.   Yes Historical Provider, MD   oxycodone-acetaminophen (PERCOCET) 5-325 mg per tablet Take 1 tablet by mouth every 4 (four) hours as needed for Pain.   Yes Historical Provider, MD   potassium chloride (K-TAB) 20 mEq Take 2 tablets by mouth. 8/14/19  Yes Historical Provider, MD   promethazine (PHENERGAN) 25 MG tablet Take 25 mg by mouth every 6 (six) hours as needed for Nausea.   Yes Historical Provider, MD   spironolactone (ALDACTONE) 25 MG tablet Take 25 mg by mouth. 2/8/19 2/8/20 Yes Historical Provider, MD   VENLAFAXINE HCL (EFFEXOR ORAL) Take 150 mg by mouth once daily.   Yes Historical Provider, MD   ZOLPIDEM TARTRATE (AMBIEN ORAL) Take 12.5 mg by mouth nightly as needed.   Yes Historical Provider, MD   metOLazone (ZAROXOLYN) 5 MG tablet Take 2.5 mg  by mouth daily as needed. 5/1/19 4/30/20  Historical Provider, MD     Scheduled Meds:    acetaminophen  1,000 mg Oral Q8H    aspirin  325 mg Oral Daily    furosemide  40 mg Oral BID    gabapentin  400 mg Oral BID    mirtazapine  15 mg Oral QHS    oxyCODONE  20 mg Oral Q12H    pantoprazole  40 mg Oral Daily    polyethylene glycol  17 g Oral Daily    ramelteon  8 mg Oral QHS    senna-docusate 8.6-50 mg  2 tablet Oral BID    simethicone  1 tablet Oral QID (PC + HS)    venlafaxine  150 mg Oral Daily    warfarin  3 mg Oral Daily     Continuous Infusions:   PRN Meds:albuterol sulfate, bisacodyl, glycerin 99.5% **AND** magnesium citrate **AND** sodium chloride 0.9%, hydrocortisone, hydrOXYzine HCl, lactulose, magnesium hydroxide 400 mg/5 ml, ondansetron, oxyCODONE, oxyCODONE-acetaminophen, promethazine (PHENERGAN) IVPB, simethicone, sodium chloride 0.9%, sodium chloride 0.9%  Anticoagulants/Antiplatelets: aspirin    Allergies:   Review of patient's allergies indicates:   Allergen Reactions    Adhesive Blisters     Reaction to area in chest and up only    Codeine Itching     Sedation Hx: have not been any systemic reactions    Labs:  Recent Labs   Lab 09/25/19 0335   INR 2.3*       Recent Labs   Lab 09/25/19 0335   WBC 8.51   HGB 9.6*   HCT 31.5*   MCV 92   *      Recent Labs   Lab 09/25/19 0335   *   *   K 4.4   CL 89*   CO2 33*   BUN 29*   CREATININE 1.8*   CALCIUM 8.9   MG 2.8*   ALT 21   AST 26   ALBUMIN 2.6*   BILITOT 0.5         Vitals:  Temp: 98.4 °F (36.9 °C) (09/25/19 1934)  Pulse: 101 (09/25/19 1934)  Resp: 18 (09/25/19 1508)  BP: (!) 88/0 (09/25/19 1935)  SpO2: 97 % (09/25/19 1934)     Physical Exam:  ASA: 3  Mallampati: 2    General: no acute distress  Mental Status: alert and oriented to person, place and time  HEENT: normocephalic, atraumatic  Chest: unlabored breathing  Heart: regular heart rate  Abdomen: nondistended  Extremity: moves all extremities      Plan:  Sedation  Plan: up to moderate  Patient will undergo thoracentesis.      Miki Groves MD, MS  Radiology  PGY-2  Pager: [phone]

## 2019-09-26 NOTE — PLAN OF CARE
Problem: Occupational Therapy Goal  Goal: Occupational Therapy Goal  Description  Goals to be met by: 10/11/19     Patient will increase functional independence with ADLs by performing:    UE Dressing with [place].  LE Dressing with [place].  Grooming while standing at sink with [place].  Toileting from toilet with [place] for hygiene and clothing management. - MET 9/26  Toilet transfer to toilet with [place]. - MET 9/26  Increased functional strength to WFL for B UE.  Upper extremity exercise program x15 reps per handout, with independence.       Outcome: Ongoing, Progressing    Goals revised and updated. Continue POC     Johanna Oliva, OTR/L  Pager: [phone]  9/26/2019

## 2019-09-26 NOTE — SUBJECTIVE & OBJECTIVE
Interval History: Patient reports her dizziness has improved.  CXR with worsening left pleural effusion (IR consulted and planning for thoracentesis with drain placement) tomorrow.  Echo repeated yesterday: EF:20%, LVEDD: 5.3cm.  Patient is net negative 1.5L over the last 24 hours.  Creat up and echo reviewed; so Lasix stopped this morning.     Scheduled Meds:   acetaminophen  1,000 mg Oral Q8H    aspirin  325 mg Oral Daily    gabapentin  400 mg Oral BID    mirtazapine  15 mg Oral QHS    oxyCODONE  20 mg Oral Q12H    pantoprazole  40 mg Oral Daily    polyethylene glycol  17 g Oral Daily    ramelteon  8 mg Oral QHS    senna-docusate 8.6-50 mg  2 tablet Oral BID    simethicone  1 tablet Oral QID (PC + HS)    venlafaxine  150 mg Oral Daily     PRN Meds:albuterol sulfate, bisacodyl, glycerin 99.5% **AND** magnesium citrate **AND** sodium chloride 0.9%, hydrocortisone, hydrOXYzine HCl, lactulose, magnesium hydroxide 400 mg/5 ml, ondansetron, oxyCODONE, oxyCODONE-acetaminophen, promethazine (PHENERGAN) IVPB, simethicone, sodium chloride 0.9%    Review of patient's allergies indicates:   Allergen Reactions    Adhesive Blisters     Reaction to area in chest and up only    Codeine Itching     Objective:     Vital Signs (Most Recent):  Temp: 97.5 °F (36.4 °C) (09/26/19 0720)  Pulse: 85 (09/26/19 1141)  Resp: 16 (09/26/19 0720)  BP: (!) 82/0 (09/26/19 0720)  SpO2: 96 % (09/26/19 0720) Vital Signs (24h Range):  Temp:  [97.5 °F (36.4 °C)-98.6 °F (37 °C)] 97.5 °F (36.4 °C)  Pulse:  [] 85  Resp:  [16-18] 16  SpO2:  [91 %-98 %] 96 %  BP: ()/(0-71) 82/0     Patient Vitals for the past 72 hrs (Last 3 readings):   Weight   09/26/19 0500 107.9 kg (237 lb 12.3 oz)   09/25/19 0800 107 kg (236 lb)   09/25/19 0500 107.3 kg (236 lb 8.9 oz)     Body mass index is 37.24 kg/m².      Intake/Output Summary (Last 24 hours) at 9/26/2019 1145  Last data filed at 9/26/2019 0900  Gross per 24 hour   Intake 480 ml   Output  1750 ml   Net -1270 ml        Telemetry: Aflutter.     Physical Exam   Constitutional: She is oriented to person, place, and time. She appears well-developed and well-nourished.   HENT:   Head: Normocephalic.   Eyes: Pupils are equal, round, and reactive to light.   Neck: Normal range of motion. Neck supple.   Cardiovascular: Normal rate and regular rhythm.   VAD hum   Pulmonary/Chest: Effort normal and breath sounds normal.   Abdominal: Soft. Bowel sounds are normal.   Musculoskeletal: Normal range of motion.   Neurological: She is alert and oriented to person, place, and time.   Skin: Skin is warm and dry.   Psychiatric: She has a normal mood and affect. Her behavior is normal.   Nursing note and vitals reviewed.    Significant Labs:  CBC:  Recent Labs   Lab 09/23/19 0458 09/25/19 0335 09/26/19 0454   WBC 11.12  11.12 8.51 9.28   RBC 3.60*  3.60* 3.42* 3.42*   HGB 10.4*  10.4* 9.6* 9.5*   HCT 33.0*  33.0* 31.5* 30.6*   *  563* 530* 451*   MCV 92  92 92 90   MCH 28.9  28.9 28.1 27.8   MCHC 31.5*  31.5* 30.5* 31.0*     BNP:  Recent Labs   Lab 09/23/19 0458 09/25/19 0335   * 667*     CMP:  Recent Labs   Lab 09/24/19 0358 09/25/19 0335 09/26/19 0454    138* 109   CALCIUM 9.0 8.9 8.9   ALBUMIN 2.6* 2.6* 2.6*   PROT 6.7 6.5 6.6   * 134* 133*   K 4.5 4.4 4.4   CO2 33* 33* 32*   CL 88* 89* 89*   BUN 31* 29* 28*   CREATININE 1.6* 1.8* 2.0*   ALKPHOS 84 81 90   ALT 24 21 20   AST 30 26 25   BILITOT 0.5 0.5 0.5      Coagulation:   Recent Labs   Lab 09/24/19 0358 09/25/19 0335 09/26/19 0454   INR 2.0* 2.3* 3.1*   APTT 39.6*  39.6* 35.5* 38.4*     LDH:  Recent Labs   Lab 09/24/19  0358 09/25/19  0335 09/26/19  0454   * 287* 306*     Microbiology:  Microbiology Results (last 7 days)     ** No results found for the last 168 hours. **        I have reviewed all pertinent labs within the past 24 hours.    Estimated Creatinine Clearance: 43 mL/min (A) (based on SCr of 2 mg/dL  (H)).    Diagnostic Results:  I have reviewed all pertinent imaging results/findings within the past 24 hours.

## 2019-09-26 NOTE — PLAN OF CARE
Problem: Physical Therapy Goal  Goal: Physical Therapy Goal  Description  Goals to be met by: 10/1/2019    Patient will increase functional independence with mobility by performin. Supine to sit with Contact Guard Assistance with HOB flat - not met  2. Sit to stand transfer with Supervision - met   3. Gait  x 200 feet with Supervision  - not met  4. Ascend/descend 2 stair with Contact Guard Assistance - not met         Outcome: Ongoing, Progressing     Treatment completed. Goal 2 met. Goals appropriate

## 2019-09-26 NOTE — ASSESSMENT & PLAN NOTE
-Bilateral pleural effusions present, L>R.  -IR consulted for thoracentesis on left and planned for tomorrow with drain placement    7/24/2017       RE: Reny Christianson  [address] [facility] [address]     Dear Colleague,    Thank you for referring your patient, Reny Christianson, to the [facility] NEUROLOGY at [facility] Please see a copy of my visit note below.    [facility] CLINIC NOTE    Reny Christianson MRN# [id number]  Age: 28 year old YOB: 1989    Reason for consult: Establish care    History of Present Illness:  Ms. Christianson is a 28 year old female who is referred to us for evaluation of previously diagnosed myotonic dystrophy type 1. She was diagnosed with DM1 at around 8 birthday based on family history and her symptoms which were mostly cognitive in nature. She has been running stable stable course from a motor stand point as well as cognitively.  Sleeps from 9 till 7. She does take naps periodically. Bulbar function is good. She does not fall. She has history of colon cancer and pseudoobstructio but does not have significant gastrointestinal symptoms. Her myotonia is relatively moderate.    Past Medical History:  Past Medical History:   Diagnosis Date     Myotonic dystrophy, type 1 (H) 7/18/2017   Colon cancer - 26 yo - chemo, partial bowel resection, stomach cramps  Pseudoobstruction in October  - no surgery.    Past Surgical History:  No past surgical history on file.    Family History:  No family history on file.    Social History:  Social History   Substance Use Topics     Smoking status: Not on file     Smokeless tobacco: Not on file     Alcohol use Not on file     Works in [employer] - 20-25 hrs    Current Medications:  Current Outpatient Prescriptions   Medication Sig Dispense Refill     calcium-vitamin D (CALTRATE) 600-400 MG-UNIT per tablet Take 1 tablet by mouth 2 times daily       Ascorbic Acid (VITAMIN C PO) Take 500 mg by mouth 2 times daily       multivitamin, therapeutic with minerals (MULTI-VITAMIN) TABS tablet Take 1 tablet by mouth daily Women's Health Vitamin        HYDROcodone-acetaminophen (NORCO) 5-325 MG per tablet Take 1 tablet by mouth every 6 hours as needed for moderate to severe pain         Review of Systems:  Constitutional:   Well nourished, Well developed, Chills/sweats/fever and Difficulty sleeping  Neurological:   As in HPI, finished high school  Eyes/Ears:   Evolving cataracts no surgery yet.  Cardiovascular:   negative  Respiratory:   negative  Gastrointestinal:   As in HPI  Genitourinary:   Incontinence and occasional  Musculoskeletal: occasional knee pain  Integument:   no skin changesnegative  Hematologic:   negative  Endocrine:   negative  Psychiatric:   negative  Answers for HPI/ROS submitted by the patient on 7/10/2017   General Symptoms: Yes  Skin Symptoms: No  HENT Symptoms: No  EYE SYMPTOMS: No  HEART SYMPTOMS: No  LUNG SYMPTOMS: No  INTESTINAL SYMPTOMS: No  URINARY SYMPTOMS: No  GYNECOLOGIC SYMPTOMS: No  BREAST SYMPTOMS: No  SKELETAL SYMPTOMS: Yes  BLOOD SYMPTOMS: No  NERVOUS SYSTEM SYMPTOMS: No  MENTAL HEALTH SYMPTOMS: No  Fever: No  Loss of appetite: No  Weight loss: No  Weight gain: Yes  Fatigue: Yes  Night sweats: No  Chills: No  Increased stress: No  Excessive hunger: Yes  Excessive thirst: No  Feeling hot or cold when others believe the temperature is normal: No  Loss of height: No  Post-operative complications: No  Surgical site pain: Yes  Hallucinations: No  Change in or Loss of Energy: No  Hyperactivity: No  Confusion: No  Back pain: No  Muscle aches: Yes  Neck pain: No  Swollen joints: No  Joint pain: Yes  Bone pain: No  Muscle cramps: Yes  Muscle weakness: Yes  Joint stiffness: No  Bone fracture: No    Physical Exam:    Funduscopic Exam: normal  Appearance: Comfortable and relaxed  LOC and Cognition:  Normal:   Alert and oriented to time, person and place for age, Appropriate and fluent speech for age, Follows commands appropriately for age and Affect pleasant, behavior cooperative    Cranial Nerves:  Partial II, III/IV/VI, V, VII,  VIII, IX/X, XI, XII Normal:   Pupils 3 mm react briskly and appear symmetric, vision grossly intact, peripheral fields intact, extraocular movements conjugate and full, bilateral mild ptosis, facial sensation and muscle weakness which is symmetric, swallow and voice quality unremarkable, hearing grossly intact, shoulders shrugs strong and symmetric, and tongue midline.      Motor:  Strength in upper and lower extremities was 5/5 for bilateral deltoids, biceps, triceps, wrist flexors, wrist extensors, hand intrinsics, bilateral hip flexors, knee extensors, knee flexors, dorsiflexion and plantar flexion with exception of mild weakness in neck felxion and weakness in the deep finger felxors.      Coordination:  Normal:   Coordination testing:  finger to nose and heel to shin tests were intact and symmetric    Deep Tendon Reflexes:  Normal:  Deep tendon reflexes were 2+ and symmetric for biceps, triceps, brachioradialis, patellae and Achilles  Nguyễn's negative  No ankle clonus  Bilateral response to plantar stimulation, downgoing toes    Sensation:  Light touch and pinprick normal:  Sensation to light touch and pinprick intact and symmetric in upper and lower extremities  Vibration normal:   Vibration intact and symmetric in upper and lower extremities        Assessment and Plan:  Ms. Christianson presents with childhood onset of myotonic dystrophy type 1 with mild motor dysfunction and moderate cognitive disorder. She is stable and well adapted. Her course was complicated by colon cancer which is under control s/p chemo and resection.    Continue current course.  PT evaluation.  Cardiology eval.  PFT next visit in 1 year.   consult.    Amount of time performed on this consult: 45 minutes.    Kevin Dumas MD

## 2019-09-26 NOTE — ASSESSMENT & PLAN NOTE
-HeartMate 3 Implanted 9/10/19 as DT.  -HTS Primary.  -Implanted by Dr. Walden  -INR therapeutic. Goal INR 2.0-3.0. Supratherapeutic today; will hold Coumadin in anticipation of thoracentesis   -Antiplatelets:  mg.  -LDH is stable overall today. Will continue to monitor daily.  -Speed set at 5200  -Not listed for OHTx.  - weaned off.   -Echo 9/18: LVIDD 5.69, TAPSE 0.86, AV does not open. The ventricular septum is at midline. Repeating today  -Continue Pt/OT/VAD education.    Procedure: Device Interrogation Including analysis of device parameters  Current Settings: Ventricular Assist Device  Review of device function is stable    TXP LVAD INTERROGATIONS 9/26/2019 9/26/2019 9/25/2019 9/25/2019 9/25/2019 9/25/2019 9/25/2019   Type HeartMate3 HeartMate3 HeartMate3 HeartMate3 HeartMate3 HeartMate3 HeartMate3   Flow 4.1 4.1 4.2 3.8 3.8 3.8 3.8   Speed 5200 5200 5200 5200 5200 5200 5200   PI 2.8 3.6 2.9 3.3 3.3 3.3 3.3   Power ([place]) 3.6 3.6 3.7 3.5 3.5 3.5 3.5   LSL 4800 4800 4800 4800 4800 4800 4800   Pulsatility No Pulse - - - - - -

## 2019-09-26 NOTE — PLAN OF CARE
Plan of care discussed with patient. Patient is free of fall/trauma/injury. Monitor showing paced/conduction defect/Afib. C/o SOB after bathing. LVAD numbers WNL. LVAD dressing change already done on day shift per RN. Pain meds given as scheduled. Uneventful night shift.  All questions addressed. Will continue to monitor

## 2019-09-26 NOTE — PLAN OF CARE
Pt free of falls/trauma/injuries.  Denies c/o SOB.  Incisional and chronic back pain managed with scheduled PO analgesics.  Relaxation techniques encouraged.   Generalized skin remains CDI; 1-2+ edema noted.  LVAD working properly this shift without any complications.  LVAD dressing to be changed daily with soap/NS; dressing remains CDI.   Lasix discontinued this shift; however, wt trending up.  Thoracentesis on hold as INR is 3.1.  PT/OT following.  Pt tolerating plan of care.

## 2019-09-26 NOTE — PROGRESS NOTES
09/26/2019  Christo Cooper    Current provider:  Jolene Lua MD      I, Christo Cooper, rounded on Deborah Oliva Navas to ensure all mechanical assist device settings (IABP or VAD) were appropriate and all parameters were within limits.  I was able to ensure all back up equipment was present, the staff had no issues, and the Perfusion Department daily rounding was complete.    10:59 AM

## 2019-09-26 NOTE — PT/OT/SLP PROGRESS
Physical Therapy Treatment    Patient Name:  Deborah Navas   MRN:  [id number]    Recommendations:     Discharge Recommendations:  home health PT   Discharge Equipment Recommendations: none   Barriers to discharge: None    Assessment:     Deborah Navas is a 48 y.o. female admitted with a medical diagnosis of LVAD (left ventricular assist device) present.  She presents with the following impairments/functional limitations:  impaired endurance, gait instability, decreased upper extremity function, impaired cardiopulmonary response to activity. Pt progressing towards goals, but not at PLOF. Pt tolerated session well with no c/o dizziness or malaise.  Pt is improving with therapy evidenced by increased gait distance. Recommend d/c to HHPT to maximize functional independence.      Rehab Prognosis: Good; patient would benefit from acute skilled PT services to address these deficits and reach maximum level of function.    Recent Surgery: Procedure(s) (LRB):  CARDIOVERSION (N/A)  ECHOCARDIOGRAM,TRANSESOPHAGEAL (N/A) 2 Days Post-Op    Plan:     During this hospitalization, patient to be seen 5 x/week to address the identified rehab impairments via gait training, therapeutic activities, therapeutic exercises, neuromuscular re-education and progress toward the following goals:    · Plan of Care Expires:  10/10/19    Subjective     Chief Complaint: nervous about the suppository that was administered 30 minutes before session  Patient/Family Comments/goals: to get better and return home   Pain/Comfort:  · Pain Rating 1: 0/10  · Pain Rating Post-Intervention 1: 0/10      Objective:     Communicated with RN prior to session.  Patient found HOB elevated with telemetry, peripheral IV, LVAD upon PT entry to room.     General Precautions: Standard, fall, sternal, LVAD   Orthopedic Precautions:N/A   Braces: N/A     Functional Mobility:  · Bed Mobility:     · Scooting: modified independence  · Supine to Sit: modified  independence with HOB slightly elevated   · Transfers:     · Sit to Stand:  supervision with no AD from EOB   · Gait: 220ft with SBA with no rest breaks needed and no chair follow  · Emergency bag intact  · No LOB  · Slight SOB  · Pt demo'd decreased jonathan      AM-PAC 6 CLICK MOBILITY  Turning over in bed (including adjusting bedclothes, sheets and blankets)?: 4  Sitting down on and standing up from a chair with arms (e.g., wheelchair, bedside commode, etc.): 4  Moving from lying on back to sitting on the side of the bed?: 4  Moving to and from a bed to a chair (including a wheelchair)?: 3  Need to walk in hospital room?: 3  Climbing 3-5 steps with a railing?: 3  Basic Mobility Total Score: 21       Therapeutic Activities and Exercises:  Educated pt on PT role/POC  Educated pt on importance of OOB activity and daily ambulation   Educated pt on sternal precautions   Pt verbalized understanding     LVAD switched from wall>>battery power by pt with indep  No alarms sounded    Pt returned to bed 2nd to waiting for suppository to work       Patient left HOB elevated with all lines intact, call button in reach and RN notified..    GOALS:   Multidisciplinary Problems     Physical Therapy Goals        Problem: Physical Therapy Goal    Goal Priority Disciplines Outcome Goal Variances Interventions   Physical Therapy Goal     PT, PT/OT Ongoing, Progressing     Description:  Goals to be met by: 10/1/2019    Patient will increase functional independence with mobility by performin. Supine to sit with Contact Guard Assistance with HOB flat - not met  2. Sit to stand transfer with Supervision - met   3. Gait  x 200 feet with Supervision  - not met  4. Ascend/descend 2 stair with Contact Guard Assistance - not met                          Time Tracking:     PT Received On: 19  PT Start Time: 945     PT Stop Time: 1003  PT Total Time (min): 18 min     Billable Minutes: Gait Training 18    Treatment Type:  Treatment  PT/PTA: PT     PTA Visit Number: 0       Niurka Posadas PT, SANDY  9/26/2019  [phone]

## 2019-09-27 LAB
ALBUMIN SERPL BCP-MCNC: 2.7 G/DL (ref 3.5–5.2)
ALP SERPL-CCNC: 82 U/L (ref 55–135)
ALT SERPL W/O P-5'-P-CCNC: 17 U/L (ref 10–44)
ANION GAP SERPL CALC-SCNC: 13 MMOL/L (ref 8–16)
APTT BLDCRRT: 47.4 SEC (ref 21–32)
AST SERPL-CCNC: 20 U/L (ref 10–40)
BASOPHILS # BLD AUTO: 0.03 K/UL (ref 0–0.2)
BASOPHILS # BLD AUTO: 0.05 K/UL (ref 0–0.2)
BASOPHILS NFR BLD: 0.5 % (ref 0–1.9)
BASOPHILS NFR BLD: 0.7 % (ref 0–1.9)
BILIRUB SERPL-MCNC: 0.6 MG/DL (ref 0.1–1)
BNP SERPL-MCNC: 515 PG/ML (ref 0–99)
BUN SERPL-MCNC: 25 MG/DL (ref 6–20)
CALCIUM SERPL-MCNC: 9.1 MG/DL (ref 8.7–10.5)
CHLORIDE SERPL-SCNC: 92 MMOL/L (ref 95–110)
CO2 SERPL-SCNC: 33 MMOL/L (ref 23–29)
CREAT SERPL-MCNC: 1.6 MG/DL (ref 0.5–1.4)
DIFFERENTIAL METHOD: ABNORMAL
DIFFERENTIAL METHOD: ABNORMAL
EOSINOPHIL # BLD AUTO: 0.1 K/UL (ref 0–0.5)
EOSINOPHIL # BLD AUTO: 0.3 K/UL (ref 0–0.5)
EOSINOPHIL NFR BLD: 2.1 % (ref 0–8)
EOSINOPHIL NFR BLD: 3.7 % (ref 0–8)
ERYTHROCYTE [DISTWIDTH] IN BLOOD BY AUTOMATED COUNT: 15.9 % (ref 11.5–14.5)
ERYTHROCYTE [DISTWIDTH] IN BLOOD BY AUTOMATED COUNT: 15.9 % (ref 11.5–14.5)
EST. GFR  (AFRICAN AMERICAN): 43.3 ML/MIN/1.73 M^2
EST. GFR  (NON AFRICAN AMERICAN): 37.6 ML/MIN/1.73 M^2
GLUCOSE SERPL-MCNC: 119 MG/DL (ref 70–110)
HCT VFR BLD AUTO: 29.1 % (ref 37–48.5)
HCT VFR BLD AUTO: 31.1 % (ref 37–48.5)
HGB BLD-MCNC: 9 G/DL (ref 12–16)
HGB BLD-MCNC: 9.6 G/DL (ref 12–16)
IMM GRANULOCYTES # BLD AUTO: 0.05 K/UL (ref 0–0.04)
IMM GRANULOCYTES # BLD AUTO: 0.07 K/UL (ref 0–0.04)
IMM GRANULOCYTES NFR BLD AUTO: 0.8 % (ref 0–0.5)
IMM GRANULOCYTES NFR BLD AUTO: 0.9 % (ref 0–0.5)
INR PPP: 2.7 (ref 0.8–1.2)
LDH SERPL L TO P-CCNC: 278 U/L (ref 110–260)
LYMPHOCYTES # BLD AUTO: 0.7 K/UL (ref 1–4.8)
LYMPHOCYTES # BLD AUTO: 0.8 K/UL (ref 1–4.8)
LYMPHOCYTES NFR BLD: 11.1 % (ref 18–48)
LYMPHOCYTES NFR BLD: 9.8 % (ref 18–48)
MAGNESIUM SERPL-MCNC: 2.5 MG/DL (ref 1.6–2.6)
MCH RBC QN AUTO: 27.4 PG (ref 27–31)
MCH RBC QN AUTO: 27.4 PG (ref 27–31)
MCHC RBC AUTO-ENTMCNC: 30.9 G/DL (ref 32–36)
MCHC RBC AUTO-ENTMCNC: 30.9 G/DL (ref 32–36)
MCV RBC AUTO: 88 FL (ref 82–98)
MCV RBC AUTO: 89 FL (ref 82–98)
MONOCYTES # BLD AUTO: 0.4 K/UL (ref 0.3–1)
MONOCYTES # BLD AUTO: 0.6 K/UL (ref 0.3–1)
MONOCYTES NFR BLD: 6 % (ref 4–15)
MONOCYTES NFR BLD: 7.5 % (ref 4–15)
NEUTROPHILS # BLD AUTO: 5.4 K/UL (ref 1.8–7.7)
NEUTROPHILS # BLD AUTO: 5.6 K/UL (ref 1.8–7.7)
NEUTROPHILS NFR BLD: 76.1 % (ref 38–73)
NEUTROPHILS NFR BLD: 80.8 % (ref 38–73)
NRBC BLD-RTO: 0 /100 WBC
NRBC BLD-RTO: 0 /100 WBC
PLATELET # BLD AUTO: 454 K/UL (ref 150–350)
PLATELET # BLD AUTO: 480 K/UL (ref 150–350)
PMV BLD AUTO: 9 FL (ref 9.2–12.9)
PMV BLD AUTO: 9.1 FL (ref 9.2–12.9)
POTASSIUM SERPL-SCNC: 3.5 MMOL/L (ref 3.5–5.1)
PROT SERPL-MCNC: 6.6 G/DL (ref 6–8.4)
PROTHROMBIN TIME: 26.5 SEC (ref 9–12.5)
RBC # BLD AUTO: 3.29 M/UL (ref 4–5.4)
RBC # BLD AUTO: 3.5 M/UL (ref 4–5.4)
SODIUM SERPL-SCNC: 138 MMOL/L (ref 136–145)
WBC # BLD AUTO: 6.66 K/UL (ref 3.9–12.7)
WBC # BLD AUTO: 7.37 K/UL (ref 3.9–12.7)

## 2019-09-27 PROCEDURE — 85025 COMPLETE CBC W/AUTO DIFF WBC: CPT | Mod: NTX

## 2019-09-27 PROCEDURE — 25000003 PHARM REV CODE 250: Mod: NTX | Performed by: STUDENT IN AN ORGANIZED HEALTH CARE EDUCATION/TRAINING PROGRAM

## 2019-09-27 PROCEDURE — 85730 THROMBOPLASTIN TIME PARTIAL: CPT | Mod: NTX

## 2019-09-27 PROCEDURE — 25000003 PHARM REV CODE 250: Mod: NTX | Performed by: INTERNAL MEDICINE

## 2019-09-27 PROCEDURE — 83615 LACTATE (LD) (LDH) ENZYME: CPT | Mod: NTX

## 2019-09-27 PROCEDURE — 93750 PR INTERROGATE VENT ASSIST DEV, IN PERSON, W PHYSICIAN ANALYSIS: ICD-10-PCS | Mod: NTX,,, | Performed by: INTERNAL MEDICINE

## 2019-09-27 PROCEDURE — 97530 THERAPEUTIC ACTIVITIES: CPT | Mod: NTX

## 2019-09-27 PROCEDURE — 27000248 HC VAD-ADDITIONAL DAY: Mod: NTX

## 2019-09-27 PROCEDURE — 99233 PR SUBSEQUENT HOSPITAL CARE,LEVL III: ICD-10-PCS | Mod: NTX,,, | Performed by: INTERNAL MEDICINE

## 2019-09-27 PROCEDURE — 80053 COMPREHEN METABOLIC PANEL: CPT | Mod: NTX

## 2019-09-27 PROCEDURE — 99233 SBSQ HOSP IP/OBS HIGH 50: CPT | Mod: NTX,,, | Performed by: INTERNAL MEDICINE

## 2019-09-27 PROCEDURE — 36415 COLL VENOUS BLD VENIPUNCTURE: CPT | Mod: NTX

## 2019-09-27 PROCEDURE — 20600001 HC STEP DOWN PRIVATE ROOM: Mod: NTX

## 2019-09-27 PROCEDURE — 63600175 PHARM REV CODE 636 W HCPCS: Mod: NTX | Performed by: STUDENT IN AN ORGANIZED HEALTH CARE EDUCATION/TRAINING PROGRAM

## 2019-09-27 PROCEDURE — 83735 ASSAY OF MAGNESIUM: CPT | Mod: NTX

## 2019-09-27 PROCEDURE — 25000003 PHARM REV CODE 250: Mod: NTX | Performed by: NURSE PRACTITIONER

## 2019-09-27 PROCEDURE — 85610 PROTHROMBIN TIME: CPT | Mod: NTX

## 2019-09-27 PROCEDURE — 93750 INTERROGATION VAD IN PERSON: CPT | Mod: NTX,,, | Performed by: INTERNAL MEDICINE

## 2019-09-27 PROCEDURE — 83880 ASSAY OF NATRIURETIC PEPTIDE: CPT | Mod: NTX

## 2019-09-27 RX ORDER — WARFARIN 3 MG/1
3 TABLET ORAL DAILY
Status: DISCONTINUED | OUTPATIENT
Start: 2019-09-27 | End: 2019-10-01

## 2019-09-27 RX ADMIN — ASPIRIN 325 MG: 325 TABLET, COATED ORAL at 08:09

## 2019-09-27 RX ADMIN — VENLAFAXINE 150 MG: 37.5 TABLET ORAL at 08:09

## 2019-09-27 RX ADMIN — SENNOSIDES AND DOCUSATE SODIUM 2 TABLET: 8.6; 5 TABLET ORAL at 09:09

## 2019-09-27 RX ADMIN — ONDANSETRON 4 MG: 2 INJECTION INTRAMUSCULAR; INTRAVENOUS at 10:09

## 2019-09-27 RX ADMIN — PANTOPRAZOLE SODIUM 40 MG: 40 TABLET, DELAYED RELEASE ORAL at 08:09

## 2019-09-27 RX ADMIN — SIMETHICONE CHEW TAB 80 MG 80 MG: 80 TABLET ORAL at 06:09

## 2019-09-27 RX ADMIN — SIMETHICONE CHEW TAB 80 MG 80 MG: 80 TABLET ORAL at 09:09

## 2019-09-27 RX ADMIN — SIMETHICONE CHEW TAB 80 MG 80 MG: 80 TABLET ORAL at 02:09

## 2019-09-27 RX ADMIN — OXYCODONE HYDROCHLORIDE 20 MG: 20 TABLET, FILM COATED, EXTENDED RELEASE ORAL at 08:09

## 2019-09-27 RX ADMIN — BISACODYL 10 MG RECTAL SUPPOSITORY 10 MG: at 08:09

## 2019-09-27 RX ADMIN — ACETAMINOPHEN 1000 MG: 500 TABLET ORAL at 09:09

## 2019-09-27 RX ADMIN — SPIRONOLACTONE 12.5 MG: 25 TABLET, FILM COATED ORAL at 06:09

## 2019-09-27 RX ADMIN — OXYCODONE HYDROCHLORIDE 20 MG: 20 TABLET, FILM COATED, EXTENDED RELEASE ORAL at 09:09

## 2019-09-27 RX ADMIN — SIMETHICONE CHEW TAB 80 MG 80 MG: 80 TABLET ORAL at 08:09

## 2019-09-27 RX ADMIN — ACETAMINOPHEN 1000 MG: 500 TABLET ORAL at 02:09

## 2019-09-27 RX ADMIN — SENNOSIDES AND DOCUSATE SODIUM 2 TABLET: 8.6; 5 TABLET ORAL at 08:09

## 2019-09-27 RX ADMIN — ACETAMINOPHEN 1000 MG: 500 TABLET ORAL at 04:09

## 2019-09-27 RX ADMIN — GABAPENTIN 400 MG: 100 CAPSULE ORAL at 08:09

## 2019-09-27 RX ADMIN — GABAPENTIN 400 MG: 100 CAPSULE ORAL at 09:09

## 2019-09-27 RX ADMIN — POLYETHYLENE GLYCOL 3350 17 G: 17 POWDER, FOR SOLUTION ORAL at 08:09

## 2019-09-27 RX ADMIN — WARFARIN SODIUM 3 MG: 3 TABLET ORAL at 06:09

## 2019-09-27 RX ADMIN — MIRTAZAPINE 15 MG: 15 TABLET, FILM COATED ORAL at 09:09

## 2019-09-27 RX ADMIN — RAMELTEON 8 MG: 8 TABLET ORAL at 09:09

## 2019-09-27 NOTE — PROGRESS NOTES
09/27/2019  Trevin Sow    Current provider:  Jolene Lua MD      I, Trevin Sow, rounded on Deborah Navas to ensure all mechanical assist device settings (IABP or VAD) were appropriate and all parameters were within limits.  I was able to ensure all back up equipment was present, the staff had no issues, and the Perfusion Department daily rounding was complete.    7:05 AM

## 2019-09-27 NOTE — PROGRESS NOTES
Newly placed pleural chest tube draining a significant amount of serosanguinous fluid.  Drainage already overflowing into second chamber by the time of arrival back to [facility].  New atrium placed at bedside for potential exchange. VS currently stable.  Pt denying any c/o pain, but is reporting repeated muscle spasms to area around LVAD, diaphragm, and upper abdomen.  Dr. Alves notified.  Per MD, no need to clamp tube at this time, but may exchange atrium as necessary.  MD stated he will come to the bedside when possible.  Charge nurse, Stefany, notified.  Will continue to monitor.

## 2019-09-27 NOTE — PLAN OF CARE
Goals reviewed and remain appropriate. Pt progressing towards goals.    Alecia Pace, PT, DPT   2019  [phone]    Problem: Physical Therapy Goal  Goal: Physical Therapy Goal  Description  Goals to be met by: 10/1/2019    Patient will increase functional independence with mobility by performin. Supine to sit with Contact Guard Assistance with HOB flat - not met  2. Sit to stand transfer with Supervision - met   3. Gait  x 200 feet with Supervision  - not met  4. Ascend/descend 2 stair with Contact Guard Assistance - not met         Outcome: Ongoing, Progressing

## 2019-09-27 NOTE — ASSESSMENT & PLAN NOTE
-Bilateral pleural effusions present, L>R.  -IR consulted for thoracentesis on left today with drain placement

## 2019-09-27 NOTE — PT/OT/SLP PROGRESS
Physical Therapy Treatment    Patient Name:  Deborah Navas   MRN:  [id number]    Recommendations:     Discharge Recommendations:  home health PT   Discharge Equipment Recommendations: none   Barriers to discharge: None    Assessment:     Deborah Navas is a 48 y.o. female admitted with a medical diagnosis of LVAD (left ventricular assist device) present.  She presents with the following impairments/functional limitations:  impaired functional mobilty, impaired cardiopulmonary response to activity, impaired endurance, gait instability. Pt is progressing functional mobility and demo'd improved endurance during ambulation compared to previous sessions. However, pt needed HHA toward end of ambulation 2* increased nausea, SOB and fatigue. Pt able to recover once seated EOB. Pt would continue to benefit form acute skilled PT services in order to continue functional mobility progression and improve endurance during ambulation.    Rehab Prognosis: Good; patient would benefit from acute skilled PT services to address these deficits and reach maximum level of function.    Recent Surgery: Procedure(s) (LRB):  CARDIOVERSION (N/A)  ECHOCARDIOGRAM,TRANSESOPHAGEAL (N/A) 3 Days Post-Op    Plan:     During this hospitalization, patient to be seen 5 x/week to address the identified rehab impairments via gait training, therapeutic activities, therapeutic exercises, neuromuscular re-education and progress toward the following goals:    · Plan of Care Expires:  10/10/19    Subjective     Chief Complaint: nausea   Patient/Family Comments/goals: motivated to go walking  Pain/Comfort: 0/10      Objective:     Communicated with RN prior to session.  Patient found left sidelying with telemetry, LVAD upon PT entry to room. Pt found on battery power upon entry into room.    General Precautions: Standard, sternal, NPO, LVAD, fall   Orthopedic Precautions:N/A   Braces: N/A     Functional Mobility:  · Bed Mobility:     · Supine  to Sit: stand by assistance and HOB elevated  · Sit to Supine: stand by assistance   · Transfers:     · Sit to Stand:  supervision with no AD from EOB  · Gait: 250 ft SBA and no AD  · Needed HHA x1 toward the end of ambulation 2* nausea and fatigue  · Decreased step length, impaired weight shifting and decreased foot to floor clearance  · Emergency bag present  · Cues given for self-pacing and breathing       AM-PAC 6 CLICK MOBILITY  Turning over in bed (including adjusting bedclothes, sheets and blankets)?: 4  Sitting down on and standing up from a chair with arms (e.g., wheelchair, bedside commode, etc.): 4  Moving from lying on back to sitting on the side of the bed?: 4  Moving to and from a bed to a chair (including a wheelchair)?: 3  Need to walk in hospital room?: 3  Climbing 3-5 steps with a railing?: 3  Basic Mobility Total Score: 21       Therapeutic Activities and Exercises:  Pt found on battery power prior to start of session. Pt c/o of nausea but wanted to go walking today. Pt able to don consolidation bag with both points of contact without cues before standing from EOB. Emergency bag present during ambulation. Educated pt to ambulate with RN and PCT over the weekend to continue progressing functional mobility. Pt verbalized understanding. RN notified at end of session about nausea.    Patient left supine with all lines intact, call button in reach and RN notified.    GOALS:   Multidisciplinary Problems     Physical Therapy Goals        Problem: Physical Therapy Goal    Goal Priority Disciplines Outcome Goal Variances Interventions   Physical Therapy Goal     PT, PT/OT Ongoing, Progressing     Description:  Goals to be met by: 10/1/2019    Patient will increase functional independence with mobility by performin. Supine to sit with Contact Guard Assistance with HOB flat - not met  2. Sit to stand transfer with Supervision - met   3. Gait  x 200 feet with Supervision  - not met  4.  Ascend/descend 2 stair with Contact Guard Assistance - not met                          Time Tracking:     PT Received On: 09/27/19  PT Start Time: 1008     PT Stop Time: 1022  PT Total Time (min): 14 min     Billable Minutes: Therapeutic Activity 14    Treatment Type: Treatment  PT/PTA: PT     PTA Visit Number: 0     Claudia Clark, SPT  9/27/2019

## 2019-09-27 NOTE — PT/OT/SLP PROGRESS
Occupational Therapy   Treatment    Name: Deborah Navas  MRN: [id number]  Admitting Diagnosis:  LVAD (left ventricular assist device) present  3 Days Post-Op    Recommendations:     Discharge Recommendations: home health PT  Discharge Equipment Recommendations:  none  Barriers to discharge:  None    Assessment:     Deborah Navas is a 48 y.o. female with a medical diagnosis of LVAD (left ventricular assist device) present.  She presents with performance deficits affecting function are weakness, impaired functional mobilty, impaired self care skills, impaired cardiopulmonary response to activity, gait instability. Pt tolerated session well and is progressing well towards all goals. Pt would potentially benefit from once more OT treatment for assessment of full self-care routine and LVAD management. Pt would benefit from continued skilled acute OT services in order to maximize independence and safety with ADLs and functional mobility to ensure safe return to PLOF in the least restrictive environment.      Rehab Prognosis:  Good; patient would benefit from acute skilled OT services to address these deficits and reach maximum level of function.       Plan:     Patient to be seen 4 x/week to address the above listed problems via self-care/home management, therapeutic activities, therapeutic exercises  · Plan of Care Expires: 10/11/19  · Plan of Care Reviewed with: patient    Subjective     Pain/Comfort:  · Pain Rating 1: 0/10  · Pain Rating Post-Intervention 1: 0/10    Objective:     Communicated with: RN prior to session.  Patient found ambulating back to chair from bathroom  with telemetry upon OT entry to room.    General Precautions: Standard, fall, LVAD, NPO, sternal   Orthopedic Precautions:N/A   Braces: N/A     Occupational Performance:     Bed Mobility:    · Not performed this date     Functional Mobility/Transfers:  · Patient completed Sit <> Stand Transfer with independence from EOB with  no  assistive device   · Patient completed Toilet Transfer Step Transfer technique with independence with  no AD  · Functional Mobility: Pt engaging in functional mobility to simulate household/community distances approx 200ft  with SBA using no AD  in order to maximize functional activity tolerance and standing balance required for engagement in occupations of choice.   · Pt reported slight SOB, no LOB or c/o dizziness/lighhtheadedness  · Pt with slight instability as pt fatigues  · Emergency bag present     Activities of Daily Living:  · Toileting: independence for clothing management and hygiene - pt reports she has been ambulating to and from bathroom independently.       [facility] 6 Click ADL: 21    Treatment & Education:  - Pt educated on role of OT, POC, and goals for therapy.   - pt found with LVAD on battery power. Pt required min A for correct battery rotation as pt has incorrect batteries in emergency bag for today.    - Pt reported she completed switch from LVAD wall power to battery power independently. Binder checked by OT and noted not to be filled out for today's date.   - Educated pt on being appropriate to transfer with nsg and PCT with supervision for pt safety  - pt educated on OT weekend scheduling and importance of ambulating at least x4/daily.   - Time provided for therapeutic counseling and discussion of health disposition.   - pt left with LVAD on battery power.   - Pt completed ADLs and functional mobility for treatment session as noted above   - Pt verbalized understanding. Pt expressed no further concerns/questions.  - whiteboard updated     Patient left up in chair with all lines intact, call button in reach and RN notifiedEducation:      GOALS:   Multidisciplinary Problems     Occupational Therapy Goals        Problem: Occupational Therapy Goal    Goal Priority Disciplines Outcome Interventions   Occupational Therapy Goal     OT, PT/OT Ongoing, Progressing    Description:  Goals to be met by:  10/11/19     Patient will increase functional independence with ADLs by performing:    UE Dressing with [place].  LE Dressing with [place].  Grooming while standing at sink with [place].  Toileting from toilet with [place] for hygiene and clothing management. - MET 9/26  Toilet transfer to toilet with [place]. - MET 9/26  Increased functional strength to WFL for B UE.  Upper extremity exercise program x15 reps per handout, with independence.                        Time Tracking:     OT Date of Treatment: 09/27/19  OT Start Time: 1432  OT Stop Time: 1441  OT Total Time (min): 9 min    Billable Minutes:Therapeutic Activity 9    Johanna Oliva OT  9/27/2019

## 2019-09-27 NOTE — PROGRESS NOTES
Report called to Kim osborn. Patient transported to room accompanied per Ir nurse and transport via stretcher.chest xray cleared per dr atwood.

## 2019-09-27 NOTE — PLAN OF CARE
Pt free of falls/trauma/injuries.  Denies c/o SOB.  Incisional and chronic back pain managed with scheduled PO analgesics.  Nausea managed with IVP Zofran.  Relaxation techniques and deep breathing exercises encouraged.   Generalized skin remains CDI; 1-2+ edema noted.  LVAD working properly this shift without any complications.  LVAD dressing to be changed daily with soap/NS; dressing remains CDI.  PT/OT following; pt able to ambulate in room and hallway with standby assist.  Plan for chest tube placement for L pleural effusion this afternoon. Coumadin to restart this evening. Pt tolerating plan of care.

## 2019-09-27 NOTE — PROGRESS NOTES
Procedure complete. Patient tolerated well. 8fr drainage catheter inserted in left back area, connected to h2o seal suction to gravity. Chest xray ordered.

## 2019-09-27 NOTE — PLAN OF CARE
Plan of care reviewed with patient, patient verbalizes understanding. Pt arrived to ir 189 for Left pleural drainage catheter. Pt oriented to unit and staff. Comfort measures utilized. Pt safely transferred from stretcher to procedural table. Fall risk reviewed with patient, fall risk interventions maintained. Safety strap applied, positioner pillows utilized to minimize pressure points. Blankets applied. Pt prepped and draped utilizing standard sterile technique. Patient placed on continuous monitoring, as required by sedation policy. Timeouts completed utilizing standard universal time-out, per department and facility policy. RN to remain at bedside, continuous monitoring maintained. Pt resting comfortably. Denies pain/discomfort. Will continue to monitor. See flow sheets for monitoring, medication administration, and updates.

## 2019-09-27 NOTE — PROGRESS NOTES
[facility]  Heart Transplant  Progress Note    Patient Name: Deborah Navas  MRN: [id number]  Admission Date: 9/4/2019  Hospital Length of Stay: 23 days  Attending Physician: Jolene Lua MD  Primary Care Provider: Primary Doctor No  Principal Problem:LVAD (left ventricular assist device) present    Subjective:     Interval History: Patient reports her dizziness has improved.  Held lasix yesterday and creatine improved today.  She got a suppository yesterday and had BM but it was not adequate.  She is planning on getting another one today.  IR planning for Thoracentesis with drain placement today.  She is net negative 960cc in the last 24 hours    Scheduled Meds:   acetaminophen  1,000 mg Oral Q8H    aspirin  325 mg Oral Daily    gabapentin  400 mg Oral BID    mirtazapine  15 mg Oral QHS    oxyCODONE  20 mg Oral Q12H    pantoprazole  40 mg Oral Daily    polyethylene glycol  17 g Oral Daily    ramelteon  8 mg Oral QHS    senna-docusate 8.6-50 mg  2 tablet Oral BID    simethicone  1 tablet Oral QID (PC + HS)    venlafaxine  150 mg Oral Daily     PRN Meds:albuterol sulfate, bisacodyl, glycerin 99.5% **AND** magnesium citrate **AND** sodium chloride 0.9%, hydrocortisone, hydrOXYzine HCl, lactulose, magnesium hydroxide 400 mg/5 ml, ondansetron, oxyCODONE, oxyCODONE-acetaminophen, promethazine (PHENERGAN) IVPB, simethicone, sodium chloride 0.9%    Review of patient's allergies indicates:   Allergen Reactions    Adhesive Blisters     Reaction to area in chest and up only    Codeine Itching     Objective:     Vital Signs (Most Recent):  Temp: 97.6 °F (36.4 °C) (09/27/19 1145)  Pulse: 70 (09/27/19 1145)  Resp: 18 (09/27/19 1145)  BP: (!) 84/0 (09/27/19 1145)  SpO2: 99 % (09/27/19 1145) Vital Signs (24h Range):  Temp:  [96.2 °F (35.7 °C)-98.6 °F (37 °C)] 97.6 °F (36.4 °C)  Pulse:  [50-96] 70  Resp:  [12-18] 18  SpO2:  [92 %-99 %] 99 %  BP: ()/(0-62) 84/0     Patient Vitals for  the past 72 hrs (Last 3 readings):   Weight   09/27/19 0430 108.1 kg (238 lb 5.1 oz)   09/26/19 0500 107.9 kg (237 lb 12.3 oz)   09/25/19 0800 107 kg (236 lb)     Body mass index is 37.33 kg/m².      Intake/Output Summary (Last 24 hours) at 9/27/2019 1219  Last data filed at 9/27/2019 0830  Gross per 24 hour   Intake 950 ml   Output 1850 ml   Net -900 ml        Telemetry: Aflutter.     Physical Exam   Constitutional: She is oriented to person, place, and time. She appears well-developed and well-nourished.   HENT:   Head: Normocephalic.   Eyes: Pupils are equal, round, and reactive to light.   Neck: Normal range of motion. Neck supple.   Cardiovascular: Normal rate and regular rhythm.   VAD hum   Pulmonary/Chest: Effort normal and breath sounds normal.   Abdominal: Soft. Bowel sounds are normal.   Musculoskeletal: Normal range of motion.   Neurological: She is alert and oriented to person, place, and time.   Skin: Skin is warm and dry.   Psychiatric: She has a normal mood and affect. Her behavior is normal.   Nursing note and vitals reviewed.    Significant Labs:  CBC:  Recent Labs   Lab 09/25/19 0335 09/26/19 0454 09/27/19  0534   WBC 8.51 9.28 7.37   RBC 3.42* 3.42* 3.50*   HGB 9.6* 9.5* 9.6*   HCT 31.5* 30.6* 31.1*   * 451* 480*   MCV 92 90 89   MCH 28.1 27.8 27.4   MCHC 30.5* 31.0* 30.9*     BNP:  Recent Labs   Lab 09/23/19 0458 09/25/19 0335 09/27/19  0534   * 667* 515*     CMP:  Recent Labs   Lab 09/25/19 0335 09/26/19 0454 09/27/19  0534   * 109 119*   CALCIUM 8.9 8.9 9.1   ALBUMIN 2.6* 2.6* 2.7*   PROT 6.5 6.6 6.6   * 133* 138   K 4.4 4.4 3.5   CO2 33* 32* 33*   CL 89* 89* 92*   BUN 29* 28* 25*   CREATININE 1.8* 2.0* 1.6*   ALKPHOS 81 90 82   ALT 21 20 17   AST 26 25 20   BILITOT 0.5 0.5 0.6      Coagulation:   Recent Labs   Lab 09/25/19  0335 09/26/19 0454 09/27/19  0534   INR 2.3* 3.1* 2.7*   APTT 35.5* 38.4* 47.4*     LDH:  Recent Labs   Lab 09/25/19 0335 09/26/19  0454  09/27/19  0534   * 306* 278*     Microbiology:  Microbiology Results (last 7 days)     ** No results found for the last 168 hours. **        I have reviewed all pertinent labs within the past 24 hours.    Estimated Creatinine Clearance: 53.8 mL/min (A) (based on SCr of 1.6 mg/dL (H)).    Diagnostic Results:  I have reviewed all pertinent imaging results/findings within the past 24 hours.    Assessment and Plan:     No notes on file    * LVAD (left ventricular assist device) present  -HeartMate 3 Implanted 9/10/19 as DT.  -HTS Primary.  -Implanted by Dr. Walden  -INR therapeutic. Goal INR 2.0-3.0. Therapeutic today; Coumadin held yesterday in anticipation for thoracentesis today.  Will redose today   -Antiplatelets:  mg.  -LDH is stable overall today. Will continue to monitor daily.  -Speed set at 5200  -Not listed for OHTx.  - weaned off.   -Echo 9/18: LVIDD 5.69, TAPSE 0.86, AV does not open. The ventricular septum is at midline. Repeating today  -Continue Pt/OT/VAD education.    Procedure: Device Interrogation Including analysis of device parameters  Current Settings: Ventricular Assist Device  Review of device function is stable    TXP LVAD INTERROGATIONS 9/27/2019 9/27/2019 9/26/2019 9/26/2019 9/26/2019 9/26/2019 9/26/2019   Type HeartMate3 HeartMate3 HeartMate3 HeartMate3 HeartMate3 HeartMate3 HeartMate3   Flow 4.3 3.9 4.2 4.3 4.3 4.1 4.1   Speed 5200 5200 5200 5200 5200 5250 5200   PI 3.0 4.6 3.0 2.5 2.8 4.0 2.8   Power (Orellana) 3.8 3.9 3.6 3.7 3.7 3.7 3.6   LSL - 4800 4800 4800 - - 4800   Pulsatility - No Pulse - - - - No Pulse       Pleural effusion  -Bilateral pleural effusions present, L>R.  -IR consulted for thoracentesis on left today with drain placement     Acute on chronic combined systolic and diastolic heart failure  NICM EF 20% s/p LVAD 9/10  RHC: done on admit 9/4/19 RA: 20/ 20/ 18 RV: 60/ 8/ 18 PA: 60/ 32/ 45 PWP: 43/ 70/ 40 . Cardiac output was 2.27 by Fitz. Cardiac index is 1.04  L/min/m2. O2 Sat: PA 34%. AO 95% PVR 2.2 PALMER    -see s/p LVAD      Enlarged thyroid  Ultrasound done and showed enlarged multinodular thyroid which warrants surveillance in one year.   - TSH and fT4 wnl    Abnormal CT scan  -focal opacity found at right base.  DDx: atelectasis.  Recommending repeat CT in 3 months    ICD (implantable cardioverter-defibrillator) in place  -Medtronic CRT-D.  -Patient began v-pacing at 59 and 9/14/19 began having phrenic nerve stimulation. Amiodarone held and EP consulted, device settings changed and PNS ceased.     History of ventricular tachycardia  -In the setting of hypokalemia  -Monitor electrolytes closely    CKD (chronic kidney disease)  -Creatinine at baseline  - Avoid nephrotoxic agents    Constipation  -Continue Senna/Colace/miralax.  -Continue prn milk of Mag. Will add lactulose as well.      Atrial flutter  -Continue Amio/anticoagulation.   -EP Cx. S/p NADINE/DCCV 9/25      ZAC Kelly  Heart Transplant  [facility]

## 2019-09-27 NOTE — PLAN OF CARE
Problem: Occupational Therapy Goal  Goal: Occupational Therapy Goal  Description  Goals to be met by: 10/11/19     Patient will increase functional independence with ADLs by performing:    UE Dressing with Opp.  LE Dressing with Opp.  Grooming while standing at sink with Opp.  Toileting from toilet with Opp for hygiene and clothing management. - MET 9/26  Toilet transfer to toilet with Opp. - MET 9/26  Increased functional strength to WFL for B UE.  Upper extremity exercise program x15 reps per handout, with independence.       Outcome: Ongoing, Progressing    Johanna Oliva, OTR/L  Pager: [phone]  9/27/2019

## 2019-09-27 NOTE — PROCEDURES
Radiology Post-Procedure Note    Pre Op Diagnosis: Pleural effusion    Post Op Diagnosis: Same    Procedure: Chest tube placement    Procedure performed by: Tony Su MD, Viviana Collins MD    Written Informed Consent Obtained: Yes    Specimen Removed: YES     Estimated Blood Loss: Minimal    Findings: Local anesthesia and moderate sedation were used.    A left posterior approach was used to insert a 8.0-Jordanian  all-purpose drainage catheter into the pleural space using CT guidance.  serosanguinous fluid was removed.  The tube was secured using pigtail formation of the distal end as well as skin suture. Postprocedural imaging demonstrates decrease in size of the collection and no significant pneumothorax.    The patient tolerated the procedure well and there were no complications.  Please see Imaging report for further details.    Viviana Collins MD  Resident  Department of Radiology  Pager: [phone]

## 2019-09-27 NOTE — H&P
Inpatient Radiology Pre-procedure Note    History of Present Illness:  49 yo female with hx NICM: EF 20%, LVEDD: 6.8cm, V-fib reported in setting of hypokalemia with appropriate shock from ICD (on Amiodarone), HLP, being admitted from procedure room for management of cardiogenic shock and workup for advanced options.  Patient initally say Dr. Zarate in clinic 8/27/19 as initial consult for advanced options.  At time, she reported feeling fine with only complaint of fatigue which was at baseline    IR consulted for chest tube.    Admission H&P reviewed.    Past Medical History:   Diagnosis Date    Fractures     History of ventricular fibrillation 9/4/2019    History of ventricular tachycardia 9/4/2019    Hyperlipidemia     Migraine     Osteoarthritis      Past Surgical History:   Procedure Laterality Date    BACK SURGERY      2007    BONE GRAFT Left     from Left hip to Left FA    eardrum reconstruction  1980    ELBOW SURGERY Left [id number]    FOREARM FRACTURE SURGERY Bilateral [id number]    multiple surgeries    INSERTION OF GRAFT TO PERICARDIUM  9/10/2019    Procedure: INSERTION, GRAFT, PERICARDIUM;  Surgeon: Shar Walden MD;  Location: [facility] OR [address] [place] [address];  Service: Cardiovascular;;    INSERTION OF IMPLANTABLE CARDIOVERTER-DEFIBRILLATOR (ICD) GENERATOR WITH TWO EXISTING LEADS      INSERTION OF PACEMAKER Left     LEFT VENTRICULAR ASSIST DEVICE N/A 9/10/2019    Procedure: INSERTION-LEFT VENTRICULAR ASSIST DEVICE;  Surgeon: Shar Walden MD;  Location: [facility] OR [address] [place] [address];  Service: Cardiovascular;  Laterality: N/A;  DT HM3     LUMBAR FUSION  2007    L4-L5    RIGHT HEART CATHETERIZATION Right 9/4/2019    Procedure: INSERTION, CATHETER, RIGHT HEART;  Surgeon: Vi Bryant MD;  Location: [facility] CATH LAB;  Service: Cardiology;  Laterality: Right;    SINUS SURGERY Right 1994    with lymph nodes    STERNAL WOUND CLOSURE  9/10/2019    Procedure: CLOSURE, WOUND, STERNUM;  Surgeon: Shar Walden MD;   Location: [facility] OR [facility] FLR;  Service: Cardiovascular;;    TEMPOROMANDIBULAR JOINT SURGERY Right 1988    TONSILLECTOMY      TREATMENT OF CARDIAC ARRHYTHMIA N/A 9/24/2019    Procedure: CARDIOVERSION;  Surgeon: Moe Barrera MD;  Location: [facility] EP LAB;  Service: Cardiology;  Laterality: N/A;  AF, DCCV/NADINE, anes, DM, Rm 3073    TYMPANOSTOMY TUBE PLACEMENT  1971- 1979    multiple tube placements       Review of Systems:   As documented in primary team H&P    Home Meds:   Prior to Admission medications    Medication Sig Start Date End Date Taking? Authorizing Provider   amiodarone (PACERONE) 200 MG Tab Take 200 mg by mouth 2 (two) times daily.  8/19/19  Yes Historical Provider, MD   aspirin (ECOTRIN) 81 MG EC tablet Take 81 mg by mouth.   Yes Historical Provider, MD   atorvastatin (LIPITOR) 40 MG tablet Take 40 mg by mouth once daily. 7/28/19  Yes Historical Provider, MD   cpm-phenyleph-acetaminophen (NOREL AD) 4- mg Tab Take by mouth 2 (two) times daily.   Yes Historical Provider, MD   docusate sodium (COLACE) 100 MG capsule Take 100 mg by mouth once daily.   Yes Historical Provider, MD   furosemide (LASIX) 40 MG tablet Take 40 mg by mouth. 6/17/19  Yes Historical Provider, MD   gabapentin (NEURONTIN) 800 MG tablet Take 400 mg by mouth 3 (three) times daily.    Yes Historical Provider, MD   loratadine (CLARITIN) 10 mg tablet Take 10 mg by mouth daily as needed.   Yes Historical Provider, MD   oxycodone-acetaminophen (PERCOCET) 5-325 mg per tablet Take 1 tablet by mouth every 4 (four) hours as needed for Pain.   Yes Historical Provider, MD   potassium chloride (K-TAB) 20 mEq Take 2 tablets by mouth. 8/14/19  Yes Historical Provider, MD   promethazine (PHENERGAN) 25 MG tablet Take 25 mg by mouth every 6 (six) hours as needed for Nausea.   Yes Historical Provider, MD   spironolactone (ALDACTONE) 25 MG tablet Take 25 mg by mouth. 2/8/19 2/8/20 Yes Historical Provider, MD   VENLAFAXINE HCL (EFFEXOR ORAL) Take 150  mg by mouth once daily.   Yes Historical Provider, MD   ZOLPIDEM TARTRATE (AMBIEN ORAL) Take 12.5 mg by mouth nightly as needed.   Yes Historical Provider, MD   metOLazone (ZAROXOLYN) 5 MG tablet Take 2.5 mg by mouth daily as needed. 5/1/19 4/30/20  Historical Provider, MD     Scheduled Meds:    acetaminophen  1,000 mg Oral Q8H    aspirin  325 mg Oral Daily    gabapentin  400 mg Oral BID    mirtazapine  15 mg Oral QHS    oxyCODONE  20 mg Oral Q12H    pantoprazole  40 mg Oral Daily    polyethylene glycol  17 g Oral Daily    ramelteon  8 mg Oral QHS    senna-docusate 8.6-50 mg  2 tablet Oral BID    simethicone  1 tablet Oral QID (PC + HS)    venlafaxine  150 mg Oral Daily    warfarin  3 mg Oral Daily     Continuous Infusions:   PRN Meds:albuterol sulfate, bisacodyl, glycerin 99.5% **AND** magnesium citrate **AND** sodium chloride 0.9%, hydrocortisone, hydrOXYzine HCl, lactulose, magnesium hydroxide 400 mg/5 ml, ondansetron, oxyCODONE, oxyCODONE-acetaminophen, promethazine (PHENERGAN) IVPB, simethicone, sodium chloride 0.9%  Anticoagulants/Antiplatelets: aspirin    Allergies:   Review of patient's allergies indicates:   Allergen Reactions    Adhesive Blisters     Reaction to area in chest and up only    Codeine Itching     Sedation Hx: have not been any systemic reactions    Labs:  Recent Labs   Lab 09/27/19  0534   INR 2.7*       Recent Labs   Lab 09/27/19  0534   WBC 7.37   HGB 9.6*   HCT 31.1*   MCV 89   *      Recent Labs   Lab 09/27/19  0534   *      K 3.5   CL 92*   CO2 33*   BUN 25*   CREATININE 1.6*   CALCIUM 9.1   MG 2.5   ALT 17   AST 20   ALBUMIN 2.7*   BILITOT 0.6         Vitals:  Temp: 97.6 °F (36.4 °C) (09/27/19 1145)  Pulse: 76 (09/27/19 1457)  Resp: 18 (09/27/19 1145)  BP: (!) 84/0 (09/27/19 1145)  SpO2: 99 % (09/27/19 1145)     Physical Exam:  ASA: 3  Mallampati: 2    General: no acute distress  Mental Status: alert and oriented to person, place and time  HEENT:  normocephalic, atraumatic  Chest: unlabored breathing  Heart: regular heart rate  Abdomen: nondistended  Extremity: moves all extremities      Plan:  Sedation Plan: local  Patient will undergo chest tube      Miki Groves MD, MS  Radiology  PGY-2  Pager: [phone]

## 2019-09-27 NOTE — PROGRESS NOTES
Pt escorted to IR via stretcher for chest tube placement.   Pre-procedure orders implemented as ordered.  Pt showing no S/S of distress; AAOx3.  Pt transported with telemetry and LVAD equipment.  Awaiting return.    1740  Pt from IR via stretcher with escort.  Pt in no distress, sitting up comfortably in bed.  Post op orders implemented.  Occlusive dressing to L back CDI.  Newly placed pleural chest tube draining serosanguinous fluid.  Pt verbalizes understanding of plan of care.  Bed locked, in lowest position, siderails up x2.  Call bell in reach.  Pt instructed to call for assistance.  Will continue to monitor.

## 2019-09-27 NOTE — PLAN OF CARE
Plan of care discussed with patient. Patient is free of fall/trauma/injury. Monitor showing paced rhythm. LVAD dressing change done on day shift per day shift RN. LVAD numbers WNL. Magnesium citrate given per pt's request for constipation. New 20g FA PIV inserted and Lt A/C PIV d/juana intact. Plan for CT placement in am instead of thoracentesis. All questions addressed. Will continue to monitor

## 2019-09-27 NOTE — PROGRESS NOTES
"LVAD dressing changed with soap/NS per orders.  Site "2", CDI, without redness or swelling. Suture remains in place to DLES.  Small amount of serous drainage noted to drain sponge.  Sanford replaced.  Pt tolerated well.  Will continue to monitor.  "

## 2019-09-27 NOTE — SUBJECTIVE & OBJECTIVE
Interval History: Patient reports her dizziness has improved.  Held lasix yesterday and creatine improved today.  She got a suppository yesterday and had BM but it was not adequate.  She is planning on getting another one today.  IR planning for Thoracentesis with drain placement today.  She is net negative 960cc in the last 24 hours    Scheduled Meds:   acetaminophen  1,000 mg Oral Q8H    aspirin  325 mg Oral Daily    gabapentin  400 mg Oral BID    mirtazapine  15 mg Oral QHS    oxyCODONE  20 mg Oral Q12H    pantoprazole  40 mg Oral Daily    polyethylene glycol  17 g Oral Daily    ramelteon  8 mg Oral QHS    senna-docusate 8.6-50 mg  2 tablet Oral BID    simethicone  1 tablet Oral QID (PC + HS)    venlafaxine  150 mg Oral Daily     PRN Meds:albuterol sulfate, bisacodyl, glycerin 99.5% **AND** magnesium citrate **AND** sodium chloride 0.9%, hydrocortisone, hydrOXYzine HCl, lactulose, magnesium hydroxide 400 mg/5 ml, ondansetron, oxyCODONE, oxyCODONE-acetaminophen, promethazine (PHENERGAN) IVPB, simethicone, sodium chloride 0.9%    Review of patient's allergies indicates:   Allergen Reactions    Adhesive Blisters     Reaction to area in chest and up only    Codeine Itching     Objective:     Vital Signs (Most Recent):  Temp: 97.6 °F (36.4 °C) (09/27/19 1145)  Pulse: 70 (09/27/19 1145)  Resp: 18 (09/27/19 1145)  BP: (!) 84/0 (09/27/19 1145)  SpO2: 99 % (09/27/19 1145) Vital Signs (24h Range):  Temp:  [96.2 °F (35.7 °C)-98.6 °F (37 °C)] 97.6 °F (36.4 °C)  Pulse:  [50-96] 70  Resp:  [12-18] 18  SpO2:  [92 %-99 %] 99 %  BP: ()/(0-62) 84/0     Patient Vitals for the past 72 hrs (Last 3 readings):   Weight   09/27/19 0430 108.1 kg (238 lb 5.1 oz)   09/26/19 0500 107.9 kg (237 lb 12.3 oz)   09/25/19 0800 107 kg (236 lb)     Body mass index is 37.33 kg/m².      Intake/Output Summary (Last 24 hours) at 9/27/2019 1219  Last data filed at 9/27/2019 0830  Gross per 24 hour   Intake 950 ml   Output 1850 ml    Net -900 ml        Telemetry: Aflutter.     Physical Exam   Constitutional: She is oriented to person, place, and time. She appears well-developed and well-nourished.   HENT:   Head: Normocephalic.   Eyes: Pupils are equal, round, and reactive to light.   Neck: Normal range of motion. Neck supple.   Cardiovascular: Normal rate and regular rhythm.   VAD hum   Pulmonary/Chest: Effort normal and breath sounds normal.   Abdominal: Soft. Bowel sounds are normal.   Musculoskeletal: Normal range of motion.   Neurological: She is alert and oriented to person, place, and time.   Skin: Skin is warm and dry.   Psychiatric: She has a normal mood and affect. Her behavior is normal.   Nursing note and vitals reviewed.    Significant Labs:  CBC:  Recent Labs   Lab 09/25/19 0335 09/26/19 0454 09/27/19  0534   WBC 8.51 9.28 7.37   RBC 3.42* 3.42* 3.50*   HGB 9.6* 9.5* 9.6*   HCT 31.5* 30.6* 31.1*   * 451* 480*   MCV 92 90 89   MCH 28.1 27.8 27.4   MCHC 30.5* 31.0* 30.9*     BNP:  Recent Labs   Lab 09/23/19 0458 09/25/19 0335 09/27/19  0534   * 667* 515*     CMP:  Recent Labs   Lab 09/25/19 0335 09/26/19 0454 09/27/19  0534   * 109 119*   CALCIUM 8.9 8.9 9.1   ALBUMIN 2.6* 2.6* 2.7*   PROT 6.5 6.6 6.6   * 133* 138   K 4.4 4.4 3.5   CO2 33* 32* 33*   CL 89* 89* 92*   BUN 29* 28* 25*   CREATININE 1.8* 2.0* 1.6*   ALKPHOS 81 90 82   ALT 21 20 17   AST 26 25 20   BILITOT 0.5 0.5 0.6      Coagulation:   Recent Labs   Lab 09/25/19 0335 09/26/19 0454 09/27/19  0534   INR 2.3* 3.1* 2.7*   APTT 35.5* 38.4* 47.4*     LDH:  Recent Labs   Lab 09/25/19 0335 09/26/19  0454 09/27/19  0534   * 306* 278*     Microbiology:  Microbiology Results (last 7 days)     ** No results found for the last 168 hours. **        I have reviewed all pertinent labs within the past 24 hours.    Estimated Creatinine Clearance: 53.8 mL/min (A) (based on SCr of 1.6 mg/dL (H)).    Diagnostic Results:  I have reviewed all  pertinent imaging results/findings within the past 24 hours.

## 2019-09-27 NOTE — ASSESSMENT & PLAN NOTE
-HeartMate 3 Implanted 9/10/19 as DT.  -HTS Primary.  -Implanted by Dr. Walden  -INR therapeutic. Goal INR 2.0-3.0. Therapeutic today; Coumadin held yesterday in anticipation for thoracentesis today.  Will redose today   -Antiplatelets:  mg.  -LDH is stable overall today. Will continue to monitor daily.  -Speed set at 5200  -Not listed for OHTx.  - weaned off.   -Echo 9/18: LVIDD 5.69, TAPSE 0.86, AV does not open. The ventricular septum is at midline. Repeating today  -Continue Pt/OT/VAD education.    Procedure: Device Interrogation Including analysis of device parameters  Current Settings: Ventricular Assist Device  Review of device function is stable    TXP LVAD INTERROGATIONS 9/27/2019 9/27/2019 9/26/2019 9/26/2019 9/26/2019 9/26/2019 9/26/2019   Type HeartMate3 HeartMate3 HeartMate3 HeartMate3 HeartMate3 HeartMate3 HeartMate3   Flow 4.3 3.9 4.2 4.3 4.3 4.1 4.1   Speed 5200 5200 5200 5200 5200 5250 5200   PI 3.0 4.6 3.0 2.5 2.8 4.0 2.8   Power (Orellana) 3.8 3.9 3.6 3.7 3.7 3.7 3.6   LSL - 4800 4800 4800 - - 4800   Pulsatility - No Pulse - - - - No Pulse

## 2019-09-28 LAB
ALBUMIN SERPL BCP-MCNC: 2.6 G/DL (ref 3.5–5.2)
ALP SERPL-CCNC: 73 U/L (ref 55–135)
ALT SERPL W/O P-5'-P-CCNC: 15 U/L (ref 10–44)
ANION GAP SERPL CALC-SCNC: 9 MMOL/L (ref 8–16)
APTT BLDCRRT: 48.7 SEC (ref 21–32)
AST SERPL-CCNC: 22 U/L (ref 10–40)
BASOPHILS # BLD AUTO: 0.04 K/UL (ref 0–0.2)
BASOPHILS NFR BLD: 0.6 % (ref 0–1.9)
BILIRUB SERPL-MCNC: 0.6 MG/DL (ref 0.1–1)
BUN SERPL-MCNC: 22 MG/DL (ref 6–20)
CALCIUM SERPL-MCNC: 8.9 MG/DL (ref 8.7–10.5)
CHLORIDE SERPL-SCNC: 94 MMOL/L (ref 95–110)
CO2 SERPL-SCNC: 33 MMOL/L (ref 23–29)
CREAT SERPL-MCNC: 1.6 MG/DL (ref 0.5–1.4)
DIFFERENTIAL METHOD: ABNORMAL
EOSINOPHIL # BLD AUTO: 0.3 K/UL (ref 0–0.5)
EOSINOPHIL NFR BLD: 4.6 % (ref 0–8)
ERYTHROCYTE [DISTWIDTH] IN BLOOD BY AUTOMATED COUNT: 15.9 % (ref 11.5–14.5)
EST. GFR  (AFRICAN AMERICAN): 43.3 ML/MIN/1.73 M^2
EST. GFR  (NON AFRICAN AMERICAN): 37.6 ML/MIN/1.73 M^2
GLUCOSE SERPL-MCNC: 105 MG/DL (ref 70–110)
HCT VFR BLD AUTO: 31.2 % (ref 37–48.5)
HGB BLD-MCNC: 9.2 G/DL (ref 12–16)
IMM GRANULOCYTES # BLD AUTO: 0.05 K/UL (ref 0–0.04)
IMM GRANULOCYTES NFR BLD AUTO: 0.7 % (ref 0–0.5)
INR PPP: 3 (ref 0.8–1.2)
LDH SERPL L TO P-CCNC: 319 U/L (ref 110–260)
LYMPHOCYTES # BLD AUTO: 0.7 K/UL (ref 1–4.8)
LYMPHOCYTES NFR BLD: 10.1 % (ref 18–48)
MAGNESIUM SERPL-MCNC: 2.7 MG/DL (ref 1.6–2.6)
MCH RBC QN AUTO: 27 PG (ref 27–31)
MCHC RBC AUTO-ENTMCNC: 29.5 G/DL (ref 32–36)
MCV RBC AUTO: 92 FL (ref 82–98)
MONOCYTES # BLD AUTO: 0.6 K/UL (ref 0.3–1)
MONOCYTES NFR BLD: 8.2 % (ref 4–15)
NEUTROPHILS # BLD AUTO: 5.5 K/UL (ref 1.8–7.7)
NEUTROPHILS NFR BLD: 75.8 % (ref 38–73)
NRBC BLD-RTO: 0 /100 WBC
PLATELET # BLD AUTO: 465 K/UL (ref 150–350)
PMV BLD AUTO: 8.7 FL (ref 9.2–12.9)
POTASSIUM SERPL-SCNC: 5 MMOL/L (ref 3.5–5.1)
PROT SERPL-MCNC: 6.3 G/DL (ref 6–8.4)
PROTHROMBIN TIME: 28.7 SEC (ref 9–12.5)
RBC # BLD AUTO: 3.41 M/UL (ref 4–5.4)
SODIUM SERPL-SCNC: 136 MMOL/L (ref 136–145)
WBC # BLD AUTO: 7.2 K/UL (ref 3.9–12.7)

## 2019-09-28 PROCEDURE — 93750 INTERROGATION VAD IN PERSON: CPT | Mod: NTX,,, | Performed by: INTERNAL MEDICINE

## 2019-09-28 PROCEDURE — 99233 PR SUBSEQUENT HOSPITAL CARE,LEVL III: ICD-10-PCS | Mod: NTX,,, | Performed by: INTERNAL MEDICINE

## 2019-09-28 PROCEDURE — 83735 ASSAY OF MAGNESIUM: CPT | Mod: NTX

## 2019-09-28 PROCEDURE — 83615 LACTATE (LD) (LDH) ENZYME: CPT | Mod: NTX

## 2019-09-28 PROCEDURE — 36415 COLL VENOUS BLD VENIPUNCTURE: CPT | Mod: NTX

## 2019-09-28 PROCEDURE — 99233 SBSQ HOSP IP/OBS HIGH 50: CPT | Mod: NTX,,, | Performed by: INTERNAL MEDICINE

## 2019-09-28 PROCEDURE — 25000003 PHARM REV CODE 250: Mod: NTX | Performed by: NURSE PRACTITIONER

## 2019-09-28 PROCEDURE — 27000248 HC VAD-ADDITIONAL DAY: Mod: NTX

## 2019-09-28 PROCEDURE — 25000003 PHARM REV CODE 250: Mod: NTX | Performed by: INTERNAL MEDICINE

## 2019-09-28 PROCEDURE — 25000003 PHARM REV CODE 250: Mod: NTX | Performed by: STUDENT IN AN ORGANIZED HEALTH CARE EDUCATION/TRAINING PROGRAM

## 2019-09-28 PROCEDURE — 20600001 HC STEP DOWN PRIVATE ROOM: Mod: NTX

## 2019-09-28 PROCEDURE — 93750 PR INTERROGATE VENT ASSIST DEV, IN PERSON, W PHYSICIAN ANALYSIS: ICD-10-PCS | Mod: NTX,,, | Performed by: INTERNAL MEDICINE

## 2019-09-28 PROCEDURE — 85025 COMPLETE CBC W/AUTO DIFF WBC: CPT | Mod: NTX

## 2019-09-28 PROCEDURE — 85610 PROTHROMBIN TIME: CPT | Mod: NTX

## 2019-09-28 PROCEDURE — 85730 THROMBOPLASTIN TIME PARTIAL: CPT | Mod: NTX

## 2019-09-28 PROCEDURE — 80053 COMPREHEN METABOLIC PANEL: CPT | Mod: NTX

## 2019-09-28 RX ADMIN — GABAPENTIN 400 MG: 100 CAPSULE ORAL at 08:09

## 2019-09-28 RX ADMIN — ASPIRIN 325 MG: 325 TABLET, COATED ORAL at 09:09

## 2019-09-28 RX ADMIN — RAMELTEON 8 MG: 8 TABLET ORAL at 08:09

## 2019-09-28 RX ADMIN — OXYCODONE HYDROCHLORIDE AND ACETAMINOPHEN 1 TABLET: 5; 325 TABLET ORAL at 02:09

## 2019-09-28 RX ADMIN — POLYETHYLENE GLYCOL 3350 17 G: 17 POWDER, FOR SOLUTION ORAL at 09:09

## 2019-09-28 RX ADMIN — SIMETHICONE CHEW TAB 80 MG 80 MG: 80 TABLET ORAL at 08:09

## 2019-09-28 RX ADMIN — VENLAFAXINE 150 MG: 37.5 TABLET ORAL at 09:09

## 2019-09-28 RX ADMIN — SENNOSIDES AND DOCUSATE SODIUM 2 TABLET: 8.6; 5 TABLET ORAL at 09:09

## 2019-09-28 RX ADMIN — GABAPENTIN 400 MG: 100 CAPSULE ORAL at 09:09

## 2019-09-28 RX ADMIN — BISACODYL 10 MG RECTAL SUPPOSITORY 10 MG: at 02:09

## 2019-09-28 RX ADMIN — OXYCODONE HYDROCHLORIDE 20 MG: 20 TABLET, FILM COATED, EXTENDED RELEASE ORAL at 09:09

## 2019-09-28 RX ADMIN — WARFARIN SODIUM 3 MG: 3 TABLET ORAL at 05:09

## 2019-09-28 RX ADMIN — PANTOPRAZOLE SODIUM 40 MG: 40 TABLET, DELAYED RELEASE ORAL at 09:09

## 2019-09-28 RX ADMIN — SPIRONOLACTONE 12.5 MG: 25 TABLET, FILM COATED ORAL at 09:09

## 2019-09-28 RX ADMIN — OXYCODONE HYDROCHLORIDE 20 MG: 20 TABLET, FILM COATED, EXTENDED RELEASE ORAL at 08:09

## 2019-09-28 RX ADMIN — MIRTAZAPINE 15 MG: 15 TABLET, FILM COATED ORAL at 08:09

## 2019-09-28 RX ADMIN — SENNOSIDES AND DOCUSATE SODIUM 2 TABLET: 8.6; 5 TABLET ORAL at 08:09

## 2019-09-28 RX ADMIN — SIMETHICONE CHEW TAB 80 MG 80 MG: 80 TABLET ORAL at 02:09

## 2019-09-28 RX ADMIN — SIMETHICONE CHEW TAB 80 MG 80 MG: 80 TABLET ORAL at 09:09

## 2019-09-28 RX ADMIN — ACETAMINOPHEN 1000 MG: 500 TABLET ORAL at 06:09

## 2019-09-28 NOTE — CARE UPDATE
"RAPID RESPONSE NURSE PROACTIVE ROUNDING NOTE     Time of Visit:     Admit Date: 2019  LOS: 23  Code Status: Full Code   Date of Visit: 2019  : 1970  Age: 48 y.o.  Sex: female  Race: White  Bed: [facility] 3073/[facility] 3073 A:   MRN: [id number]  Was the patient discharged from an ICU this admission? yes   Was the patient discharged from a PACU within last 24 hours?  no  Did the patient receive conscious sedation/general anesthesia in last 24 hours?  no  Was the patient in the ED within the past 24 hours?  no  Was the patient started on NIPPV within the past 24 hours?  no  Attending Physician: Jolene Lua MD  Primary Service: Networked reference to record PCT     ASSESSMENT     Diagnosis: LVAD (left ventricular assist device) present    Abnormal Vital Signs: BP (!) 86/0 (BP Location: Right arm, Patient Position: Lying)   Pulse 65   Temp 98.1 °F (36.7 °C) (Oral)   Resp 18   Ht 5' 7" (1.702 m)   Wt 108.1 kg (238 lb 5.1 oz)   LMP  (Within Years)   SpO2 97%   Breastfeeding? No   BMI 37.33 kg/m²      Clinical Issues: Circulatory    Patient  has a past medical history of Fractures, History of ventricular fibrillation, History of ventricular tachycardia, Hyperlipidemia, Migraine, and Osteoarthritis.    Proactive rounding for concerns by nursing staff for excessive CT output 1750 ml.  Pt is awake, alert, talkative. No acute distress at all. Pt is on RA. Asymptomatic.  The CT drainage is serosanguinous, non-opaque.  A CBC has been sent to check hemoglobin levels.  CTS has already been notified and there was no concern presently     INTERVENTIONS/ RECOMMENDATIONS     Continue to monitor    Discussed plan of care with RNJEF, PM Becky CERVANTES.    PHYSICIAN ESCALATION     Yes/No  no    Orders received and case discussed with NA.    Disposition: Remain in room 3073.    FOLLOW-UP     Call back the Rapid Response Nurse, Becky Echols RN at [phone] for additional questions or concerns.          "

## 2019-09-28 NOTE — PROGRESS NOTES
09/28/2019  Christo Cooper    Current provider:  Jolene Lua MD      I, Christo Cooper, rounded on Deborah Navas to ensure all mechanical assist device settings (IABP or VAD) were appropriate and all parameters were within limits.  I was able to ensure all back up equipment was present, the staff had no issues, and the Perfusion Department daily rounding was complete.    8:33 AM

## 2019-09-28 NOTE — ASSESSMENT & PLAN NOTE
-Bilateral pleural effusions present, L>R.  - Drain placed left pleural space 9/27 with large amount of serosanguinous drainage  - Monitor output  - CXR

## 2019-09-28 NOTE — SUBJECTIVE & OBJECTIVE
Interval History: pleural drain placed yesterday by IR with large amount of serosanguinous output ~2L. H&H stable. No new complaints this morning besides not knowing why she is getting Tylenol 1g Q8.      Continuous Infusions:  Scheduled Meds:   aspirin  325 mg Oral Daily    gabapentin  400 mg Oral BID    mirtazapine  15 mg Oral QHS    oxyCODONE  20 mg Oral Q12H    pantoprazole  40 mg Oral Daily    polyethylene glycol  17 g Oral Daily    ramelteon  8 mg Oral QHS    senna-docusate 8.6-50 mg  2 tablet Oral BID    simethicone  1 tablet Oral QID (PC + HS)    spironolactone  12.5 mg Oral Daily    venlafaxine  150 mg Oral Daily    warfarin  3 mg Oral Daily     PRN Meds:albuterol sulfate, bisacodyl, glycerin 99.5% **AND** magnesium citrate **AND** sodium chloride 0.9%, hydrocortisone, hydrOXYzine HCl, lactulose, magnesium hydroxide 400 mg/5 ml, ondansetron, oxyCODONE-acetaminophen, promethazine (PHENERGAN) IVPB, simethicone, sodium chloride 0.9%    Review of patient's allergies indicates:   Allergen Reactions    Adhesive Blisters     Reaction to area in chest and up only    Codeine Itching     Objective:     Vital Signs (Most Recent):  Temp: 97.6 °F (36.4 °C) (09/28/19 0457)  Pulse: 78 (09/28/19 0728)  Resp: 16 (09/28/19 0457)  BP: 110/68 (09/28/19 0457)  SpO2: 97 % (09/28/19 0457) Vital Signs (24h Range):  Temp:  [96.2 °F (35.7 °C)-98.4 °F (36.9 °C)] 97.6 °F (36.4 °C)  Pulse:  [61-94] 78  Resp:  [16-18] 16  SpO2:  [95 %-99 %] 97 %  BP: ()/(0-68) 110/68     Patient Vitals for the past 72 hrs (Last 3 readings):   Weight   09/28/19 0515 106.6 kg (235 lb 0.2 oz)   09/27/19 0430 108.1 kg (238 lb 5.1 oz)   09/26/19 0500 107.9 kg (237 lb 12.3 oz)     Body mass index is 36.81 kg/m².      Intake/Output Summary (Last 24 hours) at 9/28/2019 0844  Last data filed at 9/28/2019 0515  Gross per 24 hour   Intake 230 ml   Output 2750 ml   Net -2520 ml       Physical Exam   Constitutional: She is oriented to person,  place, and time. She appears well-developed and well-nourished.   HENT:   Head: Normocephalic.   Eyes: Pupils are equal, round, and reactive to light.   Neck: Normal range of motion. Neck supple.   Cardiovascular: Normal rate and regular rhythm.   VAD hum   Pulmonary/Chest: Effort normal and breath sounds normal.   CT left chest wall with serosanguinous drainage (mostly serous). On water seal   Abdominal: Soft. Bowel sounds are normal.   Musculoskeletal: Normal range of motion.   Neurological: She is alert and oriented to person, place, and time.   Skin: Skin is warm and dry.   Psychiatric: She has a normal mood and affect. Her behavior is normal.   Nursing note and vitals reviewed.      Significant Labs:  CBC:  Recent Labs   Lab 09/27/19 0534 09/27/19 1910 09/28/19  0707   WBC 7.37 6.66 7.20   RBC 3.50* 3.29* 3.41*   HGB 9.6* 9.0* 9.2*   HCT 31.1* 29.1* 31.2*   * 454* 465*   MCV 89 88 92   MCH 27.4 27.4 27.0   MCHC 30.9* 30.9* 29.5*     BNP:  Recent Labs   Lab 09/23/19 0458 09/25/19  0335 09/27/19  0534   * 667* 515*     CMP:  Recent Labs   Lab 09/26/19 0454 09/27/19  0534 09/28/19  0707    119* 105   CALCIUM 8.9 9.1 8.9   ALBUMIN 2.6* 2.7* 2.6*   PROT 6.6 6.6 6.3   * 138 136   K 4.4 3.5 5.0   CO2 32* 33* 33*   CL 89* 92* 94*   BUN 28* 25* 22*   CREATININE 2.0* 1.6* 1.6*   ALKPHOS 90 82 73   ALT 20 17 15   AST 25 20 22   BILITOT 0.5 0.6 0.6      Coagulation:   Recent Labs   Lab 09/26/19 0454 09/27/19  0534 09/28/19  0707   INR 3.1* 2.7* 3.0*   APTT 38.4* 47.4* 48.7*     LDH:  Recent Labs   Lab 09/26/19 0454 09/27/19  0534 09/28/19  0707   * 278* 319*     Microbiology:  Microbiology Results (last 7 days)     ** No results found for the last 168 hours. **          I have reviewed all pertinent labs within the past 24 hours.    Estimated Creatinine Clearance: 53.4 mL/min (A) (based on SCr of 1.6 mg/dL (H)).    Diagnostic Results:  I have reviewed and interpreted all pertinent  imaging results/findings within the past 24 hours.

## 2019-09-28 NOTE — PLAN OF CARE
Pt remained free of injuries, falls, and trauma. VS stable. LVAD numbers and dopplers WDL. Pt had pleural chest tubes placed at 1730 on 9/27/19. Chest tube drainage system container full at a change of shift. Drainage system container changed. Christian pryor/ ASHA and Long DOUGLAS made aware. H/H normal. Stated to continue to monitor. Morning labs to be drawn. Pt complained of pain. Pain managed with PRN percocet. Pt over 3000mg limit for tylenol in 24hrs. Long MIMS notified stated to give percocet and continue to monitor. No complaints of sob. Plan of care reviewed with pt. Pt verbalized understanding. All questions and concerns addressed. Will continue to monitor.

## 2019-09-28 NOTE — PROGRESS NOTES
[facility]  Heart Transplant  Progress Note    Patient Name: Deborah Navas  MRN: [id number]  Admission Date: 9/4/2019  Hospital Length of Stay: 24 days  Attending Physician: Jolene Lua MD  Primary Care Provider: Primary Doctor No  Principal Problem:LVAD (left ventricular assist device) present    Subjective:     Interval History: pleural drain placed yesterday by IR with large amount of serosanguinous output ~2L. H&H stable. No new complaints this morning besides not knowing why she is getting Tylenol 1g Q8.      Continuous Infusions:  Scheduled Meds:   aspirin  325 mg Oral Daily    gabapentin  400 mg Oral BID    mirtazapine  15 mg Oral QHS    oxyCODONE  20 mg Oral Q12H    pantoprazole  40 mg Oral Daily    polyethylene glycol  17 g Oral Daily    ramelteon  8 mg Oral QHS    senna-docusate 8.6-50 mg  2 tablet Oral BID    simethicone  1 tablet Oral QID (PC + HS)    spironolactone  12.5 mg Oral Daily    venlafaxine  150 mg Oral Daily    warfarin  3 mg Oral Daily     PRN Meds:albuterol sulfate, bisacodyl, glycerin 99.5% **AND** magnesium citrate **AND** sodium chloride 0.9%, hydrocortisone, hydrOXYzine HCl, lactulose, magnesium hydroxide 400 mg/5 ml, ondansetron, oxyCODONE-acetaminophen, promethazine (PHENERGAN) IVPB, simethicone, sodium chloride 0.9%    Review of patient's allergies indicates:   Allergen Reactions    Adhesive Blisters     Reaction to area in chest and up only    Codeine Itching     Objective:     Vital Signs (Most Recent):  Temp: 97.6 °F (36.4 °C) (09/28/19 0457)  Pulse: 78 (09/28/19 0728)  Resp: 16 (09/28/19 0457)  BP: 110/68 (09/28/19 0457)  SpO2: 97 % (09/28/19 0457) Vital Signs (24h Range):  Temp:  [96.2 °F (35.7 °C)-98.4 °F (36.9 °C)] 97.6 °F (36.4 °C)  Pulse:  [61-94] 78  Resp:  [16-18] 16  SpO2:  [95 %-99 %] 97 %  BP: ()/(0-68) 110/68     Patient Vitals for the past 72 hrs (Last 3 readings):   Weight   09/28/19 0515 106.6 kg (235 lb 0.2 oz)    09/27/19 0430 108.1 kg (238 lb 5.1 oz)   09/26/19 0500 107.9 kg (237 lb 12.3 oz)     Body mass index is 36.81 kg/m².      Intake/Output Summary (Last 24 hours) at 9/28/2019 0844  Last data filed at 9/28/2019 0515  Gross per 24 hour   Intake 230 ml   Output 2750 ml   Net -2520 ml       Physical Exam   Constitutional: She is oriented to person, place, and time. She appears well-developed and well-nourished.   HENT:   Head: Normocephalic.   Eyes: Pupils are equal, round, and reactive to light.   Neck: Normal range of motion. Neck supple.   Cardiovascular: Normal rate and regular rhythm.   VAD hum   Pulmonary/Chest: Effort normal and breath sounds normal.   CT left chest wall with serosanguinous drainage (mostly serous). On water seal   Abdominal: Soft. Bowel sounds are normal.   Musculoskeletal: Normal range of motion.   Neurological: She is alert and oriented to person, place, and time.   Skin: Skin is warm and dry.   Psychiatric: She has a normal mood and affect. Her behavior is normal.   Nursing note and vitals reviewed.      Significant Labs:  CBC:  Recent Labs   Lab 09/27/19  0534 09/27/19  1910 09/28/19  0707   WBC 7.37 6.66 7.20   RBC 3.50* 3.29* 3.41*   HGB 9.6* 9.0* 9.2*   HCT 31.1* 29.1* 31.2*   * 454* 465*   MCV 89 88 92   MCH 27.4 27.4 27.0   MCHC 30.9* 30.9* 29.5*     BNP:  Recent Labs   Lab 09/23/19  0458 09/25/19  0335 09/27/19  0534   * 667* 515*     CMP:  Recent Labs   Lab 09/26/19  0454 09/27/19  0534 09/28/19  0707    119* 105   CALCIUM 8.9 9.1 8.9   ALBUMIN 2.6* 2.7* 2.6*   PROT 6.6 6.6 6.3   * 138 136   K 4.4 3.5 5.0   CO2 32* 33* 33*   CL 89* 92* 94*   BUN 28* 25* 22*   CREATININE 2.0* 1.6* 1.6*   ALKPHOS 90 82 73   ALT 20 17 15   AST 25 20 22   BILITOT 0.5 0.6 0.6      Coagulation:   Recent Labs   Lab 09/26/19  0454 09/27/19  0534 09/28/19  0707   INR 3.1* 2.7* 3.0*   APTT 38.4* 47.4* 48.7*     LDH:  Recent Labs   Lab 09/26/19  0454 09/27/19  0534 09/28/19  0707   LDH  306* 278* 319*     Microbiology:  Microbiology Results (last 7 days)     ** No results found for the last 168 hours. **          I have reviewed all pertinent labs within the past 24 hours.    Estimated Creatinine Clearance: 53.4 mL/min (A) (based on SCr of 1.6 mg/dL (H)).    Diagnostic Results:  I have reviewed and interpreted all pertinent imaging results/findings within the past 24 hours.    Assessment and Plan:     No notes on file    * LVAD (left ventricular assist device) present  -HeartMate 3 Implanted 9/10/19 as DT.  -HTS Primary.  -Implanted by Dr. Walden  -INR therapeutic. Goal INR 2.0-3.0. Therapeutic today; Coumadin held yesterday in anticipation for thoracentesis today.  Will redose today   -Antiplatelets:  mg.  -LDH is stable overall today. Will continue to monitor daily.  -Speed set at 5200  -Not listed for OHTx.  - weaned off.   -Echo 9/18: LVIDD 5.69, TAPSE 0.86, AV does not open. The ventricular septum is at midline. Repeating today  -Continue Pt/OT/VAD education.    Procedure: Device Interrogation Including analysis of device parameters  Current Settings: Ventricular Assist Device  Review of device function is stable    TXP LVAD INTERROGATIONS 9/27/2019 9/27/2019 9/26/2019 9/26/2019 9/26/2019 9/26/2019 9/26/2019   Type HeartMate3 HeartMate3 HeartMate3 HeartMate3 HeartMate3 HeartMate3 HeartMate3   Flow 4.3 3.9 4.2 4.3 4.3 4.1 4.1   Speed 5200 5200 5200 5200 5200 5250 5200   PI 3.0 4.6 3.0 2.5 2.8 4.0 2.8   Power (Orellana) 3.8 3.9 3.6 3.7 3.7 3.7 3.6   LSL - 4800 4800 4800 - - 4800   Pulsatility - No Pulse - - - - No Pulse       Atrial flutter  -Continue Amio/anticoagulation.   -EP Cx. S/p NADINE/DCCV 9/25    Constipation  -Continue Senna/Colace/miralax.  -Continue prn milk of Mag. Will add lactulose as well.      Pleural effusion  -Bilateral pleural effusions present, L>R.  - Drain placed left pleural space 9/27 with large amount of serosanguinous drainage  - Monitor output  - CXR    CKD  (chronic kidney disease)  -Creatinine at baseline  - Avoid nephrotoxic agents    Abnormal CT scan  -focal opacity found at right base.  DDx: atelectasis.  Recommending repeat CT in 3 months    Enlarged thyroid  Ultrasound done and showed enlarged multinodular thyroid which warrants surveillance in one year.   - TSH and fT4 wnl    History of ventricular tachycardia  -In the setting of hypokalemia  -Monitor electrolytes closely    Acute on chronic combined systolic and diastolic heart failure  NICM EF 20% s/p LVAD 9/10  RHC: done on admit 9/4/19 RA: 20/ 20/ 18 RV: 60/ 8/ 18 PA: 60/ 32/ 45 PWP: 43/ 70/ 40 . Cardiac output was 2.27 by Fitz. Cardiac index is 1.04 L/min/m2. O2 Sat: PA 34%. AO 95% PVR 2.2 PALMER    -see s/p LVAD      ICD (implantable cardioverter-defibrillator) in place  -Medtronic CRT-D.  -Patient began v-pacing at 60 and 9/14/19 began having phrenic nerve stimulation. Amiodarone held and EP consulted, device settings changed and PNS ceased.         Jatinder Alves MD  Heart Transplant  [facility]

## 2019-09-28 NOTE — PROGRESS NOTES
Left pleural chest tube continues to drain serosanginous drainage. Previous chest tube drainage device full. Chest tube drainage device changed at 2100. Approximately 200ml output since changed. BP remains stable. Christian w/ ASHA and Long DOUGLAS notified. H/H communicated to physicians will redraw in the am. Will continue to monitor.   Results for SHELL VILLARREAL (MRN [id number]) as of 9/28/2019 00:14   Ref. Range 9/27/2019 05:34 9/27/2019 19:10   Hemoglobin Latest Ref Range: 12.0 - 16.0 g/dL 9.6 (L) 9.0 (L)   Hematocrit Latest Ref Range: 37.0 - 48.5 % 31.1 (L) 29.1 (L)

## 2019-09-29 LAB
ALBUMIN SERPL BCP-MCNC: 2.7 G/DL (ref 3.5–5.2)
ALP SERPL-CCNC: 78 U/L (ref 55–135)
ALT SERPL W/O P-5'-P-CCNC: 15 U/L (ref 10–44)
ANION GAP SERPL CALC-SCNC: 13 MMOL/L (ref 8–16)
APTT BLDCRRT: 45.3 SEC (ref 21–32)
AST SERPL-CCNC: 21 U/L (ref 10–40)
BASOPHILS # BLD AUTO: 0.05 K/UL (ref 0–0.2)
BASOPHILS NFR BLD: 0.8 % (ref 0–1.9)
BILIRUB SERPL-MCNC: 0.6 MG/DL (ref 0.1–1)
BUN SERPL-MCNC: 19 MG/DL (ref 6–20)
CALCIUM SERPL-MCNC: 8.9 MG/DL (ref 8.7–10.5)
CHLORIDE SERPL-SCNC: 94 MMOL/L (ref 95–110)
CO2 SERPL-SCNC: 30 MMOL/L (ref 23–29)
CREAT SERPL-MCNC: 1.6 MG/DL (ref 0.5–1.4)
DIFFERENTIAL METHOD: ABNORMAL
EOSINOPHIL # BLD AUTO: 0.3 K/UL (ref 0–0.5)
EOSINOPHIL NFR BLD: 4.2 % (ref 0–8)
ERYTHROCYTE [DISTWIDTH] IN BLOOD BY AUTOMATED COUNT: 16 % (ref 11.5–14.5)
EST. GFR  (AFRICAN AMERICAN): 43.3 ML/MIN/1.73 M^2
EST. GFR  (NON AFRICAN AMERICAN): 37.6 ML/MIN/1.73 M^2
GLUCOSE SERPL-MCNC: 110 MG/DL (ref 70–110)
HCT VFR BLD AUTO: 32.2 % (ref 37–48.5)
HGB BLD-MCNC: 9.7 G/DL (ref 12–16)
IMM GRANULOCYTES # BLD AUTO: 0.03 K/UL (ref 0–0.04)
IMM GRANULOCYTES NFR BLD AUTO: 0.5 % (ref 0–0.5)
INR PPP: 3 (ref 0.8–1.2)
LDH SERPL L TO P-CCNC: 323 U/L (ref 110–260)
LYMPHOCYTES # BLD AUTO: 0.8 K/UL (ref 1–4.8)
LYMPHOCYTES NFR BLD: 13.6 % (ref 18–48)
MAGNESIUM SERPL-MCNC: 2.4 MG/DL (ref 1.6–2.6)
MCH RBC QN AUTO: 27.6 PG (ref 27–31)
MCHC RBC AUTO-ENTMCNC: 30.1 G/DL (ref 32–36)
MCV RBC AUTO: 92 FL (ref 82–98)
MONOCYTES # BLD AUTO: 0.5 K/UL (ref 0.3–1)
MONOCYTES NFR BLD: 7.3 % (ref 4–15)
NEUTROPHILS # BLD AUTO: 4.6 K/UL (ref 1.8–7.7)
NEUTROPHILS NFR BLD: 73.6 % (ref 38–73)
NRBC BLD-RTO: 0 /100 WBC
PLATELET # BLD AUTO: 488 K/UL (ref 150–350)
PMV BLD AUTO: 8.7 FL (ref 9.2–12.9)
POTASSIUM SERPL-SCNC: 3.3 MMOL/L (ref 3.5–5.1)
PROT SERPL-MCNC: 6.5 G/DL (ref 6–8.4)
PROTHROMBIN TIME: 29.2 SEC (ref 9–12.5)
RBC # BLD AUTO: 3.51 M/UL (ref 4–5.4)
SODIUM SERPL-SCNC: 137 MMOL/L (ref 136–145)
WBC # BLD AUTO: 6.18 K/UL (ref 3.9–12.7)

## 2019-09-29 PROCEDURE — 93750 INTERROGATION VAD IN PERSON: CPT | Mod: NTX,,, | Performed by: INTERNAL MEDICINE

## 2019-09-29 PROCEDURE — 36415 COLL VENOUS BLD VENIPUNCTURE: CPT | Mod: NTX

## 2019-09-29 PROCEDURE — 25000003 PHARM REV CODE 250: Mod: NTX | Performed by: STUDENT IN AN ORGANIZED HEALTH CARE EDUCATION/TRAINING PROGRAM

## 2019-09-29 PROCEDURE — 20600001 HC STEP DOWN PRIVATE ROOM: Mod: NTX

## 2019-09-29 PROCEDURE — 27000248 HC VAD-ADDITIONAL DAY: Mod: NTX

## 2019-09-29 PROCEDURE — 85730 THROMBOPLASTIN TIME PARTIAL: CPT | Mod: NTX

## 2019-09-29 PROCEDURE — 83615 LACTATE (LD) (LDH) ENZYME: CPT | Mod: NTX

## 2019-09-29 PROCEDURE — 80053 COMPREHEN METABOLIC PANEL: CPT | Mod: NTX

## 2019-09-29 PROCEDURE — 85025 COMPLETE CBC W/AUTO DIFF WBC: CPT | Mod: NTX

## 2019-09-29 PROCEDURE — 99233 PR SUBSEQUENT HOSPITAL CARE,LEVL III: ICD-10-PCS | Mod: NTX,,, | Performed by: INTERNAL MEDICINE

## 2019-09-29 PROCEDURE — 93750 PR INTERROGATE VENT ASSIST DEV, IN PERSON, W PHYSICIAN ANALYSIS: ICD-10-PCS | Mod: NTX,,, | Performed by: INTERNAL MEDICINE

## 2019-09-29 PROCEDURE — 99233 SBSQ HOSP IP/OBS HIGH 50: CPT | Mod: NTX,,, | Performed by: INTERNAL MEDICINE

## 2019-09-29 PROCEDURE — 25000003 PHARM REV CODE 250: Mod: NTX | Performed by: INTERNAL MEDICINE

## 2019-09-29 PROCEDURE — 25000003 PHARM REV CODE 250: Mod: NTX | Performed by: NURSE PRACTITIONER

## 2019-09-29 PROCEDURE — 85610 PROTHROMBIN TIME: CPT | Mod: NTX

## 2019-09-29 PROCEDURE — 83735 ASSAY OF MAGNESIUM: CPT | Mod: NTX

## 2019-09-29 RX ORDER — POTASSIUM CHLORIDE 20 MEQ/15ML
60 SOLUTION ORAL ONCE
Status: COMPLETED | OUTPATIENT
Start: 2019-09-29 | End: 2019-09-29

## 2019-09-29 RX ADMIN — GABAPENTIN 400 MG: 100 CAPSULE ORAL at 09:09

## 2019-09-29 RX ADMIN — SENNOSIDES AND DOCUSATE SODIUM 2 TABLET: 8.6; 5 TABLET ORAL at 09:09

## 2019-09-29 RX ADMIN — PANTOPRAZOLE SODIUM 40 MG: 40 TABLET, DELAYED RELEASE ORAL at 09:09

## 2019-09-29 RX ADMIN — OXYCODONE HYDROCHLORIDE 20 MG: 20 TABLET, FILM COATED, EXTENDED RELEASE ORAL at 09:09

## 2019-09-29 RX ADMIN — ASPIRIN 325 MG: 325 TABLET, COATED ORAL at 09:09

## 2019-09-29 RX ADMIN — RAMELTEON 8 MG: 8 TABLET ORAL at 09:09

## 2019-09-29 RX ADMIN — MIRTAZAPINE 15 MG: 15 TABLET, FILM COATED ORAL at 09:09

## 2019-09-29 RX ADMIN — SPIRONOLACTONE 12.5 MG: 25 TABLET, FILM COATED ORAL at 09:09

## 2019-09-29 RX ADMIN — SIMETHICONE CHEW TAB 80 MG 80 MG: 80 TABLET ORAL at 02:09

## 2019-09-29 RX ADMIN — SIMETHICONE CHEW TAB 80 MG 80 MG: 80 TABLET ORAL at 09:09

## 2019-09-29 RX ADMIN — WARFARIN SODIUM 3 MG: 3 TABLET ORAL at 05:09

## 2019-09-29 RX ADMIN — VENLAFAXINE 150 MG: 37.5 TABLET ORAL at 09:09

## 2019-09-29 RX ADMIN — POLYETHYLENE GLYCOL 3350 17 G: 17 POWDER, FOR SOLUTION ORAL at 09:09

## 2019-09-29 RX ADMIN — POTASSIUM CHLORIDE 60 MEQ: 20 SOLUTION ORAL at 12:09

## 2019-09-29 NOTE — PROGRESS NOTES
[facility]  Heart Transplant  Progress Note    Patient Name: Deborah Navas  MRN: [id number]  Admission Date: 9/4/2019  Hospital Length of Stay: 25 days  Attending Physician: Jolene Lua MD  Primary Care Provider: Primary Doctor No  Principal Problem:LVAD (left ventricular assist device) present    Subjective:     Interval History:     Minimal drainage via CT only 20cc out. Plans to remove it in AM. Patient having pleuritic pain from this.     Continuous Infusions:  Scheduled Meds:   aspirin  325 mg Oral Daily    gabapentin  400 mg Oral BID    mirtazapine  15 mg Oral QHS    oxyCODONE  20 mg Oral Q12H    pantoprazole  40 mg Oral Daily    polyethylene glycol  17 g Oral Daily    ramelteon  8 mg Oral QHS    senna-docusate 8.6-50 mg  2 tablet Oral BID    simethicone  1 tablet Oral QID (PC + HS)    spironolactone  12.5 mg Oral Daily    venlafaxine  150 mg Oral Daily    warfarin  3 mg Oral Daily     PRN Meds:albuterol sulfate, bisacodyl, glycerin 99.5% **AND** magnesium citrate **AND** sodium chloride 0.9%, hydrocortisone, hydrOXYzine HCl, lactulose, magnesium hydroxide 400 mg/5 ml, ondansetron, oxyCODONE-acetaminophen, promethazine (PHENERGAN) IVPB, simethicone, sodium chloride 0.9%    Review of patient's allergies indicates:   Allergen Reactions    Adhesive Blisters     Reaction to area in chest and up only    Codeine Itching     Objective:     Vital Signs (Most Recent):  Temp: 98.5 °F (36.9 °C) (09/29/19 0800)  Pulse: 86 (09/29/19 0800)  Resp: 18 (09/29/19 0800)  BP: (!) 80/0 (09/29/19 0800)  SpO2: 98 % (09/29/19 0800) Vital Signs (24h Range):  Temp:  [98.2 °F (36.8 °C)-98.7 °F (37.1 °C)] 98.5 °F (36.9 °C)  Pulse:  [] 86  Resp:  [16-18] 18  SpO2:  [92 %-98 %] 98 %  BP: (70-98)/(0-70) 80/0     Patient Vitals for the past 72 hrs (Last 3 readings):   Weight   09/29/19 0446 105.7 kg (233 lb 2.2 oz)   09/28/19 0515 106.6 kg (235 lb 0.2 oz)   09/27/19 0430 108.1 kg (238 lb  5.1 oz)     Body mass index is 36.51 kg/m².      Intake/Output Summary (Last 24 hours) at 9/29/2019 1101  Last data filed at 9/29/2019 0446  Gross per 24 hour   Intake 810 ml   Output 2120 ml   Net -1310 ml       Hemodynamic Parameters:       Telemetry:   No issues    Physical Exam   Constitutional: She is oriented to person, place, and time. She appears well-developed and well-nourished.   HENT:   Head: Normocephalic.   Eyes: Pupils are equal, round, and reactive to light.   Neck: Normal range of motion. Neck supple.   Cardiovascular: Normal rate and regular rhythm.   VAD hum   Pulmonary/Chest: Effort normal and breath sounds normal.   CT left chest wall with serosanguinous drainage (mostly serous). On water seal   Abdominal: Soft. Bowel sounds are normal.   Musculoskeletal: Normal range of motion.   Neurological: She is alert and oriented to person, place, and time.   Skin: Skin is warm and dry.   Psychiatric: She has a normal mood and affect. Her behavior is normal.   Nursing note and vitals reviewed.      Significant Labs:  CBC:  Recent Labs   Lab 09/27/19 1910 09/28/19  0707 09/29/19  0433   WBC 6.66 7.20 6.18   RBC 3.29* 3.41* 3.51*   HGB 9.0* 9.2* 9.7*   HCT 29.1* 31.2* 32.2*   * 465* 488*   MCV 88 92 92   MCH 27.4 27.0 27.6   MCHC 30.9* 29.5* 30.1*     BNP:  Recent Labs   Lab 09/23/19  0458 09/25/19  0335 09/27/19  0534   * 667* 515*     CMP:  Recent Labs   Lab 09/27/19  0534 09/28/19  0707 09/29/19  0433   * 105 110   CALCIUM 9.1 8.9 8.9   ALBUMIN 2.7* 2.6* 2.7*   PROT 6.6 6.3 6.5    136 137   K 3.5 5.0 3.3*   CO2 33* 33* 30*   CL 92* 94* 94*   BUN 25* 22* 19   CREATININE 1.6* 1.6* 1.6*   ALKPHOS 82 73 78   ALT 17 15 15   AST 20 22 21   BILITOT 0.6 0.6 0.6      Coagulation:   Recent Labs   Lab 09/27/19  0534 09/28/19  0707 09/29/19  0433   INR 2.7* 3.0* 3.0*   APTT 47.4* 48.7* 45.3*     LDH:  Recent Labs   Lab 09/27/19  0534 09/28/19  0707 09/29/19  0433   * 319* 323*      Microbiology:  Microbiology Results (last 7 days)     ** No results found for the last 168 hours. **          BMP:   Recent Labs   Lab 09/29/19  0433         K 3.3*   CL 94*   CO2 30*   BUN 19   CREATININE 1.6*   CALCIUM 8.9   MG 2.4     Cardiac Markers: No results for input(s): CKMB, TROPONINT, MYOGLOBIN in the last 72 hours.  Coagulation:   Recent Labs   Lab 09/29/19  0433   INR 3.0*   APTT 45.3*     Prealbumin: No results for input(s): PREALBUMIN in the last 72 hours.  I have reviewed all pertinent labs within the past 24 hours.    Estimated Creatinine Clearance: 53.2 mL/min (A) (based on SCr of 1.6 mg/dL (H)).    Diagnostic Results:  I have reviewed all pertinent imaging results/findings within the past 24 hours.    Assessment and Plan:     No notes on file    * LVAD (left ventricular assist device) present  -HeartMate 3 Implanted 9/10/19 as DT.  -HTS Primary.  -Implanted by Dr. Walden  -INR therapeutic. Goal INR 2.0-3.0. Therapeutic today; Coumadin held yesterday in anticipation for thoracentesis today.  Will redose today   -Antiplatelets:  mg.  -LDH is stable overall today. Will continue to monitor daily.  -Speed set at 5200  -Not listed for OHTx.  - weaned off.   -Echo 9/18: LVIDD 5.69, TAPSE 0.86, AV does not open. The ventricular septum is at midline. Repeating today  -Continue Pt/OT/VAD education.    Procedure: Device Interrogation Including analysis of device parameters  Current Settings: Ventricular Assist Device  Review of device function is stable    TXP LVAD INTERROGATIONS 9/29/2019 9/29/2019 9/28/2019 9/28/2019 9/28/2019 9/28/2019 9/28/2019   Type HeartMate3 HeartMate3 HeartMate3 HeartMate3 HeartMate3 HeartMate3 HeartMate3   Flow 4.1 4.3 4.1 4.3 4.2 4.2 4.3   Speed 5200 5200 5200 5200 5200 5200 5200   PI 3.7 3.3 2.9 2.4 2.3 2.6 2.8   Power (Orellana) 3.6 3.7 3.6 3.7 3.6 3.5 3.6   LSL 4800 4800 4800 4800 4800 4800 4800   Pulsatility Intermittent pulse Intermittent pulse  Intermittent pulse Intermittent pulse Intermittent pulse - Intermittent pulse       Atrial flutter  -Continue Amio/anticoagulation.   -EP Cx. S/p NADINE/DCCV 9/25    Constipation  -Continue Senna/Colace/miralax.  -Continue prn milk of Mag. Will add lactulose as well.      Pleural effusion  -Bilateral pleural effusions present, L>R.  - Drain placed left pleural space 9/27 with large amount of serosanguinous drainage,   - Minimal CT output ~ 24 hours only 20cc, discussed with IR, clamp CT now, repeat CXR in AM if no further pleural effusion, please place IR consult for removal.   - CXR in Am    CKD (chronic kidney disease)  -Creatinine at baseline  - Avoid nephrotoxic agents    Abnormal CT scan  -focal opacity found at right base.  DDx: atelectasis.  Recommending repeat CT in 3 months    Enlarged thyroid  Ultrasound done and showed enlarged multinodular thyroid which warrants surveillance in one year.   - TSH and fT4 wnl    History of ventricular tachycardia  -In the setting of hypokalemia  -Monitor electrolytes closely    Acute on chronic combined systolic and diastolic heart failure  NICM EF 20% s/p LVAD 9/10  RHC: done on admit 9/4/19 RA: 20/ 20/ 18 RV: 60/ 8/ 18 PA: 60/ 32/ 45 PWP: 43/ 70/ 40 . Cardiac output was 2.27 by Fitz. Cardiac index is 1.04 L/min/m2. O2 Sat: PA 34%. AO 95% PVR 2.2 PALMER    -see s/p LVAD      ICD (implantable cardioverter-defibrillator) in place  -Medtronic CRT-D.  -Patient began v-pacing at 60 and 9/14/19 began having phrenic nerve stimulation. Amiodarone held and EP consulted, device settings changed and PNS ceased.         Maia Cooper MD  Heart Transplant  [facility]

## 2019-09-29 NOTE — NURSING
Chest tube clamped. Pt instructed to let the nurse know if she begins to have any shortness of breath or difficultly breathing. Pt was able to repeat back with understanding.

## 2019-09-29 NOTE — PROGRESS NOTES
09/29/2019  Christo Cooper    Current provider:  Jolene Lua MD      I, Christo Cooper, rounded on Deborah Oliva Navas to ensure all mechanical assist device settings (IABP or VAD) were appropriate and all parameters were within limits.  I was able to ensure all back up equipment was present, the staff had no issues, and the Perfusion Department daily rounding was complete.    10:20 AM

## 2019-09-29 NOTE — PLAN OF CARE
Pt free of falls/trauma/injuries.  Denies c/o SOB; O2Sats remain stable on room air..  Incentive spirometry encouraged throughout shift.  Incisional pain managed with PO analgesics.  Generalized skin remains CDI; 2+  edema noted to BLEs.  TEDs applied / in place.  Incisions remain FARRAH, CDI.  Pt being diuresed with  PO lasix; diuresing well.   Wt remains stable. .  Electrolytes replaced as ordered.  PT/OT following; pt able to ambulate in hallway with 1-person assist. Pt has ambulated in the hallway multiple times today and tolerated well. Chest tube had drained a minimal amount. Did have bisacodyl supp with good results. States I feel much better. VAD dressing done as well as chest tube site.  Plan to continue with post-op care.  Pt tolerating plan of care.

## 2019-09-29 NOTE — PLAN OF CARE
Pt free of falls/trauma/injuries.  Denies c/o SOB; O2Sats remain stable on room air..  Incentive spirometry encouraged throughout shift.  Incisional pain managed with PO analgesics.  Generalized skin remains CDI; 2+  edema noted to BLEs.  TEDs applied / in place pt would rather not wear them states they are tight on my legs. Explained what they are for pt was able to repeat back with understanding.  Incisions remain FARRAH, CDI.  Pt being diuresed with  PO lasix; diuresing well. Wt remains stable. .  Electrolytes replaced as ordered.  PT/OT following; pt able to ambulate in hallway with 1-person assist. Pt has ambulated in the hallway multiple times today and tolerated well. Chest tube had drained a minimal amount dressing was changed, previous dressing had came off in the bed.   VAD dressing done as well as chest tube site. Chest tube remains clamped no difficulties in breathing noted.  Plan to continue with post-op care.  Pt tolerating plan of care.

## 2019-09-29 NOTE — PLAN OF CARE
Patient remains free from falls and injuries through out shift. Patient AAO and VSS. Pt denies chest pain and SOB. Patient's LVAD (HMIII) is functioning WNL, w/o any acute events or alarms thus far on shift. LVAD #'s, dopplers and MAPs WNL. Patient is everyday soap and water dressing changes. Next dressing due 9/29. Last INR 3.0. Chest tube in place; output charged on MAR. Patient's family at bedside. Plan of care reviewed with patient. Patient verbalizes understanding of plan.  Will continue to monitor. Plan of care discussed with patient.

## 2019-09-29 NOTE — ASSESSMENT & PLAN NOTE
-Bilateral pleural effusions present, L>R.  - Drain placed left pleural space 9/27 with large amount of serosanguinous drainage,   - Minimal CT output ~ 24 hours only 20cc, discussed with IR, clamp CT now, repeat CXR in AM if no further pleural effusion, please place IR consult for removal.   - CXR in Am

## 2019-09-29 NOTE — ASSESSMENT & PLAN NOTE
-HeartMate 3 Implanted 9/10/19 as DT.  -HTS Primary.  -Implanted by Dr. Walden  -INR therapeutic. Goal INR 2.0-3.0. Therapeutic today; Coumadin held yesterday in anticipation for thoracentesis today.  Will redose today   -Antiplatelets:  mg.  -LDH is stable overall today. Will continue to monitor daily.  -Speed set at 5200  -Not listed for OHTx.  - weaned off.   -Echo 9/18: LVIDD 5.69, TAPSE 0.86, AV does not open. The ventricular septum is at midline. Repeating today  -Continue Pt/OT/VAD education.    Procedure: Device Interrogation Including analysis of device parameters  Current Settings: Ventricular Assist Device  Review of device function is stable    TXP LVAD INTERROGATIONS 9/29/2019 9/29/2019 9/28/2019 9/28/2019 9/28/2019 9/28/2019 9/28/2019   Type HeartMate3 HeartMate3 HeartMate3 HeartMate3 HeartMate3 HeartMate3 HeartMate3   Flow 4.1 4.3 4.1 4.3 4.2 4.2 4.3   Speed 5200 5200 5200 5200 5200 5200 5200   PI 3.7 3.3 2.9 2.4 2.3 2.6 2.8   Power (Orellana) 3.6 3.7 3.6 3.7 3.6 3.5 3.6   LSL 4800 4800 4800 4800 4800 4800 4800   Pulsatility Intermittent pulse Intermittent pulse Intermittent pulse Intermittent pulse Intermittent pulse - Intermittent pulse

## 2019-09-29 NOTE — SUBJECTIVE & OBJECTIVE
Interval History:     Minimal drainage via CT only 20cc out. Plans to remove it in AM. Patient having pleuritic pain from this.     Continuous Infusions:  Scheduled Meds:   aspirin  325 mg Oral Daily    gabapentin  400 mg Oral BID    mirtazapine  15 mg Oral QHS    oxyCODONE  20 mg Oral Q12H    pantoprazole  40 mg Oral Daily    polyethylene glycol  17 g Oral Daily    ramelteon  8 mg Oral QHS    senna-docusate 8.6-50 mg  2 tablet Oral BID    simethicone  1 tablet Oral QID (PC + HS)    spironolactone  12.5 mg Oral Daily    venlafaxine  150 mg Oral Daily    warfarin  3 mg Oral Daily     PRN Meds:albuterol sulfate, bisacodyl, glycerin 99.5% **AND** magnesium citrate **AND** sodium chloride 0.9%, hydrocortisone, hydrOXYzine HCl, lactulose, magnesium hydroxide 400 mg/5 ml, ondansetron, oxyCODONE-acetaminophen, promethazine (PHENERGAN) IVPB, simethicone, sodium chloride 0.9%    Review of patient's allergies indicates:   Allergen Reactions    Adhesive Blisters     Reaction to area in chest and up only    Codeine Itching     Objective:     Vital Signs (Most Recent):  Temp: 98.5 °F (36.9 °C) (09/29/19 0800)  Pulse: 86 (09/29/19 0800)  Resp: 18 (09/29/19 0800)  BP: (!) 80/0 (09/29/19 0800)  SpO2: 98 % (09/29/19 0800) Vital Signs (24h Range):  Temp:  [98.2 °F (36.8 °C)-98.7 °F (37.1 °C)] 98.5 °F (36.9 °C)  Pulse:  [] 86  Resp:  [16-18] 18  SpO2:  [92 %-98 %] 98 %  BP: (70-98)/(0-70) 80/0     Patient Vitals for the past 72 hrs (Last 3 readings):   Weight   09/29/19 0446 105.7 kg (233 lb 2.2 oz)   09/28/19 0515 106.6 kg (235 lb 0.2 oz)   09/27/19 0430 108.1 kg (238 lb 5.1 oz)     Body mass index is 36.51 kg/m².      Intake/Output Summary (Last 24 hours) at 9/29/2019 1101  Last data filed at 9/29/2019 0446  Gross per 24 hour   Intake 810 ml   Output 2120 ml   Net -1310 ml       Hemodynamic Parameters:       Telemetry:   No issues    Physical Exam   Constitutional: She is oriented to person, place, and time. She  appears well-developed and well-nourished.   HENT:   Head: Normocephalic.   Eyes: Pupils are equal, round, and reactive to light.   Neck: Normal range of motion. Neck supple.   Cardiovascular: Normal rate and regular rhythm.   VAD hum   Pulmonary/Chest: Effort normal and breath sounds normal.   CT left chest wall with serosanguinous drainage (mostly serous). On water seal   Abdominal: Soft. Bowel sounds are normal.   Musculoskeletal: Normal range of motion.   Neurological: She is alert and oriented to person, place, and time.   Skin: Skin is warm and dry.   Psychiatric: She has a normal mood and affect. Her behavior is normal.   Nursing note and vitals reviewed.      Significant Labs:  CBC:  Recent Labs   Lab 09/27/19 1910 09/28/19  0707 09/29/19  0433   WBC 6.66 7.20 6.18   RBC 3.29* 3.41* 3.51*   HGB 9.0* 9.2* 9.7*   HCT 29.1* 31.2* 32.2*   * 465* 488*   MCV 88 92 92   MCH 27.4 27.0 27.6   MCHC 30.9* 29.5* 30.1*     BNP:  Recent Labs   Lab 09/23/19  0458 09/25/19  0335 09/27/19  0534   * 667* 515*     CMP:  Recent Labs   Lab 09/27/19 0534 09/28/19  0707 09/29/19  0433   * 105 110   CALCIUM 9.1 8.9 8.9   ALBUMIN 2.7* 2.6* 2.7*   PROT 6.6 6.3 6.5    136 137   K 3.5 5.0 3.3*   CO2 33* 33* 30*   CL 92* 94* 94*   BUN 25* 22* 19   CREATININE 1.6* 1.6* 1.6*   ALKPHOS 82 73 78   ALT 17 15 15   AST 20 22 21   BILITOT 0.6 0.6 0.6      Coagulation:   Recent Labs   Lab 09/27/19 0534 09/28/19  0707 09/29/19  0433   INR 2.7* 3.0* 3.0*   APTT 47.4* 48.7* 45.3*     LDH:  Recent Labs   Lab 09/27/19 0534 09/28/19  0707 09/29/19 0433   * 319* 323*     Microbiology:  Microbiology Results (last 7 days)     ** No results found for the last 168 hours. **          BMP:   Recent Labs   Lab 09/29/19 0433         K 3.3*   CL 94*   CO2 30*   BUN 19   CREATININE 1.6*   CALCIUM 8.9   MG 2.4     Cardiac Markers: No results for input(s): CKMB, TROPONINT, MYOGLOBIN in the last 72  hours.  Coagulation:   Recent Labs   Lab 09/29/19  0433   INR 3.0*   APTT 45.3*     Prealbumin: No results for input(s): PREALBUMIN in the last 72 hours.  I have reviewed all pertinent labs within the past 24 hours.    Estimated Creatinine Clearance: 53.2 mL/min (A) (based on SCr of 1.6 mg/dL (H)).    Diagnostic Results:  I have reviewed all pertinent imaging results/findings within the past 24 hours.

## 2019-09-30 LAB
ALBUMIN SERPL BCP-MCNC: 2.4 G/DL (ref 3.5–5.2)
ALP SERPL-CCNC: 69 U/L (ref 55–135)
ALT SERPL W/O P-5'-P-CCNC: 15 U/L (ref 10–44)
ANION GAP SERPL CALC-SCNC: 13 MMOL/L (ref 8–16)
APTT BLDCRRT: 45.9 SEC (ref 21–32)
AST SERPL-CCNC: 26 U/L (ref 10–40)
BASOPHILS # BLD AUTO: 0.05 K/UL (ref 0–0.2)
BASOPHILS NFR BLD: 0.6 % (ref 0–1.9)
BILIRUB SERPL-MCNC: 0.5 MG/DL (ref 0.1–1)
BNP SERPL-MCNC: 730 PG/ML (ref 0–99)
BUN SERPL-MCNC: 18 MG/DL (ref 6–20)
CALCIUM SERPL-MCNC: 8.2 MG/DL (ref 8.7–10.5)
CHLORIDE SERPL-SCNC: 99 MMOL/L (ref 95–110)
CO2 SERPL-SCNC: 23 MMOL/L (ref 23–29)
CREAT SERPL-MCNC: 1.2 MG/DL (ref 0.5–1.4)
DIFFERENTIAL METHOD: ABNORMAL
EOSINOPHIL # BLD AUTO: 0.3 K/UL (ref 0–0.5)
EOSINOPHIL NFR BLD: 3.5 % (ref 0–8)
ERYTHROCYTE [DISTWIDTH] IN BLOOD BY AUTOMATED COUNT: 16.1 % (ref 11.5–14.5)
EST. GFR  (AFRICAN AMERICAN): >60 ML/MIN/1.73 M^2
EST. GFR  (NON AFRICAN AMERICAN): 53.2 ML/MIN/1.73 M^2
GLUCOSE SERPL-MCNC: 89 MG/DL (ref 70–110)
HCT VFR BLD AUTO: 31 % (ref 37–48.5)
HGB BLD-MCNC: 8.8 G/DL (ref 12–16)
IMM GRANULOCYTES # BLD AUTO: 0.04 K/UL (ref 0–0.04)
IMM GRANULOCYTES NFR BLD AUTO: 0.5 % (ref 0–0.5)
INR PPP: 2.4 (ref 0.8–1.2)
LDH SERPL L TO P-CCNC: 303 U/L (ref 110–260)
LYMPHOCYTES # BLD AUTO: 1 K/UL (ref 1–4.8)
LYMPHOCYTES NFR BLD: 13.5 % (ref 18–48)
MAGNESIUM SERPL-MCNC: 2.1 MG/DL (ref 1.6–2.6)
MCH RBC QN AUTO: 26.9 PG (ref 27–31)
MCHC RBC AUTO-ENTMCNC: 28.4 G/DL (ref 32–36)
MCV RBC AUTO: 95 FL (ref 82–98)
MONOCYTES # BLD AUTO: 0.8 K/UL (ref 0.3–1)
MONOCYTES NFR BLD: 10.8 % (ref 4–15)
NEUTROPHILS # BLD AUTO: 5.5 K/UL (ref 1.8–7.7)
NEUTROPHILS NFR BLD: 71.1 % (ref 38–73)
NRBC BLD-RTO: 0 /100 WBC
PLATELET # BLD AUTO: 411 K/UL (ref 150–350)
PMV BLD AUTO: 9.2 FL (ref 9.2–12.9)
POTASSIUM SERPL-SCNC: 4.7 MMOL/L (ref 3.5–5.1)
PROT SERPL-MCNC: 6.2 G/DL (ref 6–8.4)
PROTHROMBIN TIME: 23.3 SEC (ref 9–12.5)
RBC # BLD AUTO: 3.27 M/UL (ref 4–5.4)
SODIUM SERPL-SCNC: 135 MMOL/L (ref 136–145)
WBC # BLD AUTO: 7.7 K/UL (ref 3.9–12.7)

## 2019-09-30 PROCEDURE — 25000003 PHARM REV CODE 250: Mod: NTX | Performed by: NURSE PRACTITIONER

## 2019-09-30 PROCEDURE — 97535 SELF CARE MNGMENT TRAINING: CPT | Mod: NTX

## 2019-09-30 PROCEDURE — 99233 SBSQ HOSP IP/OBS HIGH 50: CPT | Mod: NTX,,, | Performed by: INTERNAL MEDICINE

## 2019-09-30 PROCEDURE — 85025 COMPLETE CBC W/AUTO DIFF WBC: CPT | Mod: NTX

## 2019-09-30 PROCEDURE — 97116 GAIT TRAINING THERAPY: CPT | Mod: NTX

## 2019-09-30 PROCEDURE — 83735 ASSAY OF MAGNESIUM: CPT | Mod: NTX

## 2019-09-30 PROCEDURE — 80053 COMPREHEN METABOLIC PANEL: CPT | Mod: NTX

## 2019-09-30 PROCEDURE — 25000003 PHARM REV CODE 250: Mod: NTX | Performed by: STUDENT IN AN ORGANIZED HEALTH CARE EDUCATION/TRAINING PROGRAM

## 2019-09-30 PROCEDURE — 25000003 PHARM REV CODE 250: Mod: NTX | Performed by: INTERNAL MEDICINE

## 2019-09-30 PROCEDURE — 20600001 HC STEP DOWN PRIVATE ROOM: Mod: NTX

## 2019-09-30 PROCEDURE — 85730 THROMBOPLASTIN TIME PARTIAL: CPT | Mod: NTX

## 2019-09-30 PROCEDURE — 93750 PR INTERROGATE VENT ASSIST DEV, IN PERSON, W PHYSICIAN ANALYSIS: ICD-10-PCS | Mod: NTX,,, | Performed by: INTERNAL MEDICINE

## 2019-09-30 PROCEDURE — 93750 INTERROGATION VAD IN PERSON: CPT | Mod: NTX,,, | Performed by: INTERNAL MEDICINE

## 2019-09-30 PROCEDURE — 85610 PROTHROMBIN TIME: CPT | Mod: NTX

## 2019-09-30 PROCEDURE — 36415 COLL VENOUS BLD VENIPUNCTURE: CPT | Mod: NTX

## 2019-09-30 PROCEDURE — 27000248 HC VAD-ADDITIONAL DAY: Mod: NTX

## 2019-09-30 PROCEDURE — 99233 PR SUBSEQUENT HOSPITAL CARE,LEVL III: ICD-10-PCS | Mod: NTX,,, | Performed by: INTERNAL MEDICINE

## 2019-09-30 PROCEDURE — 83615 LACTATE (LD) (LDH) ENZYME: CPT | Mod: NTX

## 2019-09-30 PROCEDURE — 83880 ASSAY OF NATRIURETIC PEPTIDE: CPT | Mod: NTX

## 2019-09-30 RX ADMIN — SENNOSIDES AND DOCUSATE SODIUM 2 TABLET: 8.6; 5 TABLET ORAL at 08:09

## 2019-09-30 RX ADMIN — SIMETHICONE CHEW TAB 80 MG 80 MG: 80 TABLET ORAL at 08:09

## 2019-09-30 RX ADMIN — ASPIRIN 325 MG: 325 TABLET, COATED ORAL at 09:09

## 2019-09-30 RX ADMIN — OXYCODONE HYDROCHLORIDE 20 MG: 20 TABLET, FILM COATED, EXTENDED RELEASE ORAL at 09:09

## 2019-09-30 RX ADMIN — GABAPENTIN 400 MG: 100 CAPSULE ORAL at 08:09

## 2019-09-30 RX ADMIN — RAMELTEON 8 MG: 8 TABLET ORAL at 08:09

## 2019-09-30 RX ADMIN — SPIRONOLACTONE 12.5 MG: 25 TABLET, FILM COATED ORAL at 09:09

## 2019-09-30 RX ADMIN — SIMETHICONE CHEW TAB 80 MG 80 MG: 80 TABLET ORAL at 09:09

## 2019-09-30 RX ADMIN — MIRTAZAPINE 15 MG: 15 TABLET, FILM COATED ORAL at 08:09

## 2019-09-30 RX ADMIN — PANTOPRAZOLE SODIUM 40 MG: 40 TABLET, DELAYED RELEASE ORAL at 09:09

## 2019-09-30 RX ADMIN — OXYCODONE HYDROCHLORIDE 20 MG: 20 TABLET, FILM COATED, EXTENDED RELEASE ORAL at 08:09

## 2019-09-30 RX ADMIN — GABAPENTIN 400 MG: 100 CAPSULE ORAL at 09:09

## 2019-09-30 RX ADMIN — WARFARIN SODIUM 3 MG: 3 TABLET ORAL at 06:09

## 2019-09-30 RX ADMIN — POLYETHYLENE GLYCOL 3350 17 G: 17 POWDER, FOR SOLUTION ORAL at 09:09

## 2019-09-30 RX ADMIN — SENNOSIDES AND DOCUSATE SODIUM 2 TABLET: 8.6; 5 TABLET ORAL at 09:09

## 2019-09-30 RX ADMIN — SIMETHICONE CHEW TAB 80 MG 80 MG: 80 TABLET ORAL at 02:09

## 2019-09-30 RX ADMIN — VENLAFAXINE 150 MG: 37.5 TABLET ORAL at 09:09

## 2019-09-30 RX ADMIN — SIMETHICONE CHEW TAB 80 MG 80 MG: 80 TABLET ORAL at 06:09

## 2019-09-30 NOTE — PLAN OF CARE
"Pt maintained free from falls/ trauma/ injuries and skin breakdown. Pt  complain of lower rib "gas pain" when inhaled; physician notified by patient during round. Patient receive schedule pain med and pain is well under control. Pt has pleural chest tube d/t pleural effusion and is currently clamp to see how patient tolerant. X-ray completed this morning showing diminished fluid but incomplete reexpansion of the left lung. Plan of care reviewed. Pt verbalized understanding. All questions and concerns addressed. Will continue to monitor.    "

## 2019-09-30 NOTE — PLAN OF CARE
Problem: Physical Therapy Goal  Goal: Physical Therapy Goal  Description  Goals to be met by: 10/1/2019    Patient will increase functional independence with mobility by performin. Supine to sit with Contact Guard Assistance with HOB flat - met 2019  2. Sit to stand transfer with Supervision - met   3. Gait  x 200 feet with Supervision  - met 2019  Gait x200 feet with modified independence- not met   4. Ascend/descend 2 stair with Contact Guard Assistance - met 2019            Outcome: Ongoing, Progressing    Pt is progressing toward goals. All goals remain appropriate.    Consuelo Toledo, PT, DPT  2019  [phone]

## 2019-09-30 NOTE — PLAN OF CARE
Goals achieved. OT to d/c services   Problem: Occupational Therapy Goal  Goal: Occupational Therapy Goal  Description  Goals to be met by: 10/11/19     Patient will increase functional independence with ADLs by performing:    UE Dressing with [place].  LE Dressing with [place].  Grooming while standing at sink with [place].  Toileting from toilet with [place] for hygiene and clothing management. - MET 9/26  Toilet transfer to toilet with [place]. - MET 9/26  Increased functional strength to WFL for B UE.  Upper extremity exercise program x15 reps per handout, with independence.  Goals achieved         Outcome: Met

## 2019-09-30 NOTE — PROGRESS NOTES
"[facility]  Heart Transplant  Progress Note    Patient Name: Deborah Navas  MRN: [id number]  Admission Date: 9/4/2019  Hospital Length of Stay: 26 days  Attending Physician: Jolene Lua MD  Primary Care Provider: Primary Doctor No  Principal Problem:LVAD (left ventricular assist device) present    Subjective:     Interval History: Patient feels well this morning. Reports feeling intermittent "gas pain". Chest tube clamped yesterday. CXR this morning with only minimal pleural effusion.     Continuous Infusions:  Scheduled Meds:   aspirin  325 mg Oral Daily    gabapentin  400 mg Oral BID    mirtazapine  15 mg Oral QHS    oxyCODONE  20 mg Oral Q12H    pantoprazole  40 mg Oral Daily    polyethylene glycol  17 g Oral Daily    ramelteon  8 mg Oral QHS    senna-docusate 8.6-50 mg  2 tablet Oral BID    simethicone  1 tablet Oral QID (PC + HS)    spironolactone  12.5 mg Oral Daily    venlafaxine  150 mg Oral Daily    warfarin  3 mg Oral Daily     PRN Meds:albuterol sulfate, bisacodyl, glycerin 99.5% **AND** magnesium citrate **AND** sodium chloride 0.9%, hydrocortisone, hydrOXYzine HCl, lactulose, magnesium hydroxide 400 mg/5 ml, ondansetron, oxyCODONE-acetaminophen, promethazine (PHENERGAN) IVPB, simethicone, sodium chloride 0.9%    Review of patient's allergies indicates:   Allergen Reactions    Adhesive Blisters     Reaction to area in chest and up only    Codeine Itching     Objective:     Vital Signs (Most Recent):  Temp: 98.7 °F (37.1 °C) (09/30/19 1209)  Pulse: 95 (09/30/19 1500)  Resp: 16 (09/30/19 1209)  BP: (!) 82/0 (09/30/19 1209)  SpO2: (!) 91 % (09/30/19 1209) Vital Signs (24h Range):  Temp:  [98.1 °F (36.7 °C)-99.1 °F (37.3 °C)] 98.7 °F (37.1 °C)  Pulse:  [70-99] 95  Resp:  [16-20] 16  SpO2:  [91 %-98 %] 91 %  BP: ()/(0-70) 82/0     Patient Vitals for the past 72 hrs (Last 3 readings):   Weight   09/30/19 0506 107.2 kg (236 lb 5.3 oz)   09/29/19 0446 105.7 kg " (233 lb 2.2 oz)   09/28/19 0515 106.6 kg (235 lb 0.2 oz)     Body mass index is 37.02 kg/m².      Intake/Output Summary (Last 24 hours) at 9/30/2019 1545  Last data filed at 9/30/2019 1351  Gross per 24 hour   Intake 660 ml   Output 1550 ml   Net -890 ml       Physical Exam   Constitutional: She is oriented to person, place, and time. She appears well-developed and well-nourished.   HENT:   Head: Normocephalic.   Eyes: Pupils are equal, round, and reactive to light.   Neck: Normal range of motion. Neck supple.   Cardiovascular: Normal rate and regular rhythm.   VAD hum   Pulmonary/Chest: Effort normal and breath sounds normal.   Chest tube clamped   Abdominal: Soft. Bowel sounds are normal.   Musculoskeletal: Normal range of motion.   Neurological: She is alert and oriented to person, place, and time.   Skin: Skin is warm and dry.   Psychiatric: She has a normal mood and affect. Her behavior is normal.   Nursing note and vitals reviewed.      Significant Labs:  CBC:  Recent Labs   Lab 09/28/19  0707 09/29/19  0433 09/30/19  0501   WBC 7.20 6.18 7.70   RBC 3.41* 3.51* 3.27*   HGB 9.2* 9.7* 8.8*   HCT 31.2* 32.2* 31.0*   * 488* 411*   MCV 92 92 95   MCH 27.0 27.6 26.9*   MCHC 29.5* 30.1* 28.4*     BNP:  Recent Labs   Lab 09/25/19  0335 09/27/19  0534 09/30/19  0501   * 515* 730*     CMP:  Recent Labs   Lab 09/28/19  0707 09/29/19  0433 09/30/19  0501    110 89   CALCIUM 8.9 8.9 8.2*   ALBUMIN 2.6* 2.7* 2.4*   PROT 6.3 6.5 6.2    137 135*   K 5.0 3.3* 4.7   CO2 33* 30* 23   CL 94* 94* 99   BUN 22* 19 18   CREATININE 1.6* 1.6* 1.2   ALKPHOS 73 78 69   ALT 15 15 15   AST 22 21 26   BILITOT 0.6 0.6 0.5      Coagulation:   Recent Labs   Lab 09/28/19  0707 09/29/19  0433 09/30/19  0501   INR 3.0* 3.0* 2.4*   APTT 48.7* 45.3* 45.9*     LDH:  Recent Labs   Lab 09/28/19  0707 09/29/19  0433 09/30/19  0501   * 323* 303*     Microbiology:  Microbiology Results (last 7 days)     ** No results  found for the last 168 hours. **          Estimated Creatinine Clearance: 71.4 mL/min (based on SCr of 1.2 mg/dL).    Diagnostic Results:  CXR: X-ray Chest 1 View    Result Date: 9/30/2019  The volume of pleural fluid on the left appears diminished, and there is diminished aeration at the left base, left-sided pneumothorax is again noted, likely relating to incomplete re-expansion of the left lung associated with diminished pleural fluid, left chest tube is noted, continued follow-up is recommended. The right hemithorax appears stable. This report was flagged in Epic as abnormal. Electronically signed by: Jonathan Olguin Date:    09/30/2019 Time:    06:07    Assessment and Plan:     No notes on file    * LVAD (left ventricular assist device) present  -HeartMate 3 Implanted 9/10/19 as DT.  -HTS Primary.  -Implanted by Dr. Walden  -INR therapeutic. Goal INR 2.0-3.0. - therapeutic  -Antiplatelets:  mg.  -LDH is stable overall today. Will continue to monitor daily.  -Speed set at 5200  -Not listed for OHTx.  - weaned off.   - Education and teaching completed    Procedure: Device Interrogation Including analysis of device parameters  Current Settings: Ventricular Assist Device  Review of device function is stable    TXP LVAD INTERROGATIONS 9/30/2019 9/30/2019 9/30/2019 9/30/2019 9/29/2019 9/29/2019 9/29/2019   Type HeartMate3 HeartMate3 HeartMate3 HeartMate3 HeartMate3 HeartMate3 HeartMate3   Flow 4.3 4.3 4.1 4.2 4.1 4.2 4.1   Speed 5200 5200 5200 5200 5200 5200 5200   PI 3.1 3.1 3.3 3.4 3.2 3.0 3.4   Power (Orellana) 3.8 3.7 3.7 3.7 3.7 3.6 3.6   LSL 4800 4800 4800 4800 4800 4800 4800   Pulsatility Intermittent pulse Intermittent pulse Intermittent pulse Intermittent pulse Intermittent pulse Intermittent pulse Intermittent pulse       Atrial flutter  -Continue Amio/anticoagulation.   -EP Cx. S/p NADINE/DCCV 9/25    Constipation  -Continue Senna/Colace/miralax.  -Continue prn milk of Mag.     Pleural  effusion  -Bilateral pleural effusions present, L>R.  - Drain placed left pleural space 9/27 with large amount of serosanguinous drainage,   - CXR shows improvement. Asked IR to remove chest tube  - Repeat CXR in AM - PA and lateral      CKD (chronic kidney disease)  - Kidney function improving. Continue to monitor    Abnormal CT scan  -focal opacity found at right base.  DDx: atelectasis.  Recommending repeat CT in 3 months    Enlarged thyroid  Ultrasound done and showed enlarged multinodular thyroid which warrants surveillance in one year.   - TSH and fT4 wnl    History of ventricular tachycardia  - Monitor electrolytes - K >4, Mg >2    Acute on chronic combined systolic and diastolic heart failure  NICM EF 20% s/p LVAD 9/10  -see s/p LVAD      ICD (implantable cardioverter-defibrillator) in place  -Medtronic CRT-D.       Possible discharge tomorrow.       Alphonse Valdes MD  Heart Transplant  [facility]

## 2019-09-30 NOTE — ASSESSMENT & PLAN NOTE
-Bilateral pleural effusions present, L>R.  - Drain placed left pleural space 9/27 with large amount of serosanguinous drainage,   - CXR shows improvement. Asked IR to remove chest tube  - Repeat CXR in AM - PA and lateral

## 2019-09-30 NOTE — PLAN OF CARE
Patient remains free from falls and injuries through out shift. Patient AAO and VSS. Pt denies chest pain and SOB. Patient's LVAD (HMIII) is functioning WNL, w/o any acute events or alarms thus far on shift. LVAD #'s, dopplers and MAPs WNL. Patient is everyday soap and water dressing changes. Next dressing due 9/30. Last INR 3.0. Chest tube in place but clamped per MD orders; possible DC of tube tomorrow. Patient's family at bedside. Plan of care reviewed with patient. Patient verbalizes understanding of plan.  Will continue to monitor. Plan of care discussed with patient.

## 2019-09-30 NOTE — NURSING TRANSFER
Nursing Transfer Note      9/30/2019     Transfer To: X-ray    Transfer via bed    Transfer with cardiac monitoring/ LVAD and LVAD emergency bag    Transported by transportation     Chart send with patient: No    Notified: mother    LVAD equipments inventory completed piror to transfer

## 2019-09-30 NOTE — PROGRESS NOTES
Update:  SW met with pt alone in pt's room in order to provide continuity of care and support. Pt was AAOx4, pleasant, and engaged. Pt reports that she is coping okay but that the entire admission has been overwhelming as she had not anticipated getting an LVAD during her stay. Pt states that she has a number of supportive people in her life which include her caregiver Flavia, mother and sister. Pt reports that she is hoping for discharge this week. Pt not interested in counseling resources at this time and is on Effexor for mood. Pt to reach out if this changes. Pt reports that she will be using [facility] for HH when she discharges. 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 Pt denying any further needs at this time. SW remains available for continued psychosocial support, education, resources, and additional d/c planning as needed.

## 2019-09-30 NOTE — SUBJECTIVE & OBJECTIVE
"Interval History: Patient feels well this morning. Reports feeling intermittent "gas pain". Chest tube clamped yesterday. CXR this morning with only minimal pleural effusion.     Continuous Infusions:  Scheduled Meds:   aspirin  325 mg Oral Daily    gabapentin  400 mg Oral BID    mirtazapine  15 mg Oral QHS    oxyCODONE  20 mg Oral Q12H    pantoprazole  40 mg Oral Daily    polyethylene glycol  17 g Oral Daily    ramelteon  8 mg Oral QHS    senna-docusate 8.6-50 mg  2 tablet Oral BID    simethicone  1 tablet Oral QID (PC + HS)    spironolactone  12.5 mg Oral Daily    venlafaxine  150 mg Oral Daily    warfarin  3 mg Oral Daily     PRN Meds:albuterol sulfate, bisacodyl, glycerin 99.5% **AND** magnesium citrate **AND** sodium chloride 0.9%, hydrocortisone, hydrOXYzine HCl, lactulose, magnesium hydroxide 400 mg/5 ml, ondansetron, oxyCODONE-acetaminophen, promethazine (PHENERGAN) IVPB, simethicone, sodium chloride 0.9%    Review of patient's allergies indicates:   Allergen Reactions    Adhesive Blisters     Reaction to area in chest and up only    Codeine Itching     Objective:     Vital Signs (Most Recent):  Temp: 98.7 °F (37.1 °C) (09/30/19 1209)  Pulse: 95 (09/30/19 1500)  Resp: 16 (09/30/19 1209)  BP: (!) 82/0 (09/30/19 1209)  SpO2: (!) 91 % (09/30/19 1209) Vital Signs (24h Range):  Temp:  [98.1 °F (36.7 °C)-99.1 °F (37.3 °C)] 98.7 °F (37.1 °C)  Pulse:  [70-99] 95  Resp:  [16-20] 16  SpO2:  [91 %-98 %] 91 %  BP: ()/(0-70) 82/0     Patient Vitals for the past 72 hrs (Last 3 readings):   Weight   09/30/19 0506 107.2 kg (236 lb 5.3 oz)   09/29/19 0446 105.7 kg (233 lb 2.2 oz)   09/28/19 0515 106.6 kg (235 lb 0.2 oz)     Body mass index is 37.02 kg/m².      Intake/Output Summary (Last 24 hours) at 9/30/2019 1545  Last data filed at 9/30/2019 1351  Gross per 24 hour   Intake 660 ml   Output 1550 ml   Net -890 ml       Physical Exam   Constitutional: She is oriented to person, place, and time. She " appears well-developed and well-nourished.   HENT:   Head: Normocephalic.   Eyes: Pupils are equal, round, and reactive to light.   Neck: Normal range of motion. Neck supple.   Cardiovascular: Normal rate and regular rhythm.   VAD hum   Pulmonary/Chest: Effort normal and breath sounds normal.   Chest tube clamped   Abdominal: Soft. Bowel sounds are normal.   Musculoskeletal: Normal range of motion.   Neurological: She is alert and oriented to person, place, and time.   Skin: Skin is warm and dry.   Psychiatric: She has a normal mood and affect. Her behavior is normal.   Nursing note and vitals reviewed.      Significant Labs:  CBC:  Recent Labs   Lab 09/28/19  0707 09/29/19  0433 09/30/19  0501   WBC 7.20 6.18 7.70   RBC 3.41* 3.51* 3.27*   HGB 9.2* 9.7* 8.8*   HCT 31.2* 32.2* 31.0*   * 488* 411*   MCV 92 92 95   MCH 27.0 27.6 26.9*   MCHC 29.5* 30.1* 28.4*     BNP:  Recent Labs   Lab 09/25/19  0335 09/27/19  0534 09/30/19  0501   * 515* 730*     CMP:  Recent Labs   Lab 09/28/19  0707 09/29/19  0433 09/30/19  0501    110 89   CALCIUM 8.9 8.9 8.2*   ALBUMIN 2.6* 2.7* 2.4*   PROT 6.3 6.5 6.2    137 135*   K 5.0 3.3* 4.7   CO2 33* 30* 23   CL 94* 94* 99   BUN 22* 19 18   CREATININE 1.6* 1.6* 1.2   ALKPHOS 73 78 69   ALT 15 15 15   AST 22 21 26   BILITOT 0.6 0.6 0.5      Coagulation:   Recent Labs   Lab 09/28/19  0707 09/29/19  0433 09/30/19  0501   INR 3.0* 3.0* 2.4*   APTT 48.7* 45.3* 45.9*     LDH:  Recent Labs   Lab 09/28/19  0707 09/29/19  0433 09/30/19  0501   * 323* 303*     Microbiology:  Microbiology Results (last 7 days)     ** No results found for the last 168 hours. **          Estimated Creatinine Clearance: 71.4 mL/min (based on SCr of 1.2 mg/dL).    Diagnostic Results:  CXR: X-ray Chest 1 View    Result Date: 9/30/2019  The volume of pleural fluid on the left appears diminished, and there is diminished aeration at the left base, left-sided pneumothorax is again noted,  likely relating to incomplete re-expansion of the left lung associated with diminished pleural fluid, left chest tube is noted, continued follow-up is recommended. The right hemithorax appears stable. This report was flagged in Epic as abnormal. Electronically signed by: Jonathan Olguin Date:    09/30/2019 Time:    06:07

## 2019-09-30 NOTE — PT/OT/SLP PROGRESS
Physical Therapy Treatment    Patient Name:  Deborah Navas   MRN:  [id number]    Recommendations:     Discharge Recommendations:  home with home health   Discharge Equipment Recommendations: none   Barriers to discharge: None    Assessment:     Deborah Navas is a 48 y.o. female admitted with a medical diagnosis of LVAD (left ventricular assist device) present.  She presents with the following impairments/functional limitations:  weakness, impaired endurance, impaired functional mobilty, gait instability, impaired balance, pain, impaired cardiopulmonary response to activity. Pt participated in stair training this date, able to ascend/descent 2 stairs with contact guard assistance. Pt mobility continues to be limited by impaired endurance with increased work of breathing while ambulating. Pt would continue to benefit from acute skilled therapy intervention to address deficits and progress toward prior level of function.       Rehab Prognosis: Good; patient would benefit from acute skilled PT services to address these deficits and reach maximum level of function.    Recent Surgery: Procedure(s) (LRB):  CARDIOVERSION (N/A)  ECHOCARDIOGRAM,TRANSESOPHAGEAL (N/A) 6 Days Post-Op    Plan:     During this hospitalization, patient to be seen 3 x/week to address the identified rehab impairments via gait training, therapeutic activities, therapeutic exercises, neuromuscular re-education and progress toward the following goals:    · Plan of Care Expires:  10/10/19    Subjective     Chief Complaint: Pt c/o pain at chest tube site   Patient/Family Comments/goals: to get better and return home   Pain/Comfort:  · Pain Rating 1: (pt reporting intermittent pain, pt relates to chest tube, pt did not quantify )  · Pain Addressed 1: Cessation of Activity  · Pain Rating Post-Intervention 1: 0/10      Objective:     Communicated with RN prior to session.  Patient found supine with LVAD, telemetry, chest tube upon PT entry  to room.     General Precautions: Standard, fall, LVAD, sternal   Orthopedic Precautions:N/A   Braces: N/A     Functional Mobility:  · Bed Mobility:     · Supine to Sit: supervision  · Transfers:     · Sit to Stand:  supervision with no AD  · Gait: Pt ambulated 200 feet with no AD and supervision. Pt demo'd decreased jonathan, decreased step size, wide FIDE with lateral sway. Pt demo'd increased work of breathing, reported pain at chest tube site. Pt with no LOB, no dizziness.   · Stairs:  Pt ascended/descended 2 stair(s) with No Assistive Device with left handrail with Contact Guard Assistance and HHA       AM-PAC 6 CLICK MOBILITY  Turning over in bed (including adjusting bedclothes, sheets and blankets)?: 4  Sitting down on and standing up from a chair with arms (e.g., wheelchair, bedside commode, etc.): 4  Moving from lying on back to sitting on the side of the bed?: 4  Moving to and from a bed to a chair (including a wheelchair)?: 4  Need to walk in hospital room?: 4  Climbing 3-5 steps with a railing?: 3  Basic Mobility Total Score: 23       Therapeutic Activities and Exercises:   Pt educated on role of PT/POC. Pt verbalized understanding.   Pt encouraged to ambulate daily with assistance/supervision from nursing/therapy. Pt agreeable.  LVAD on battery power upon arrival with contents in consolidation bag. Emergency present, no alarms sounded.     Patient left up in chair with all lines intact, call button in reach and RN notified..    GOALS:   Multidisciplinary Problems     Physical Therapy Goals        Problem: Physical Therapy Goal    Goal Priority Disciplines Outcome Goal Variances Interventions   Physical Therapy Goal     PT, PT/OT Ongoing, Progressing     Description:  Goals to be met by: 10/1/2019    Patient will increase functional independence with mobility by performin. Supine to sit with Contact Guard Assistance with HOB flat - met 2019  2. Sit to stand transfer with Supervision - met  9/26  3. Gait  x 200 feet with Supervision  - met 9/30/2019  Gait x200 feet with modified independence- not met   4. Ascend/descend 2 stair with Contact Guard Assistance - met 9/30/2019                             Time Tracking:     PT Received On: 09/30/19  PT Start Time: 1404     PT Stop Time: 1420  PT Total Time (min): 16 min     Billable Minutes: Gait Training 16 mins     Treatment Type: Treatment  PT/PTA: PT     PTA Visit Number: 0     Consuelo Toledo, PT  09/30/2019

## 2019-09-30 NOTE — ASSESSMENT & PLAN NOTE
-HeartMate 3 Implanted 9/10/19 as DT.  -HTS Primary.  -Implanted by Dr. Walden  -INR therapeutic. Goal INR 2.0-3.0. - therapeutic  -Antiplatelets:  mg.  -LDH is stable overall today. Will continue to monitor daily.  -Speed set at 5200  -Not listed for OHTx.  - weaned off.   - Education and teaching completed    Procedure: Device Interrogation Including analysis of device parameters  Current Settings: Ventricular Assist Device  Review of device function is stable    TXP LVAD INTERROGATIONS 9/30/2019 9/30/2019 9/30/2019 9/30/2019 9/29/2019 9/29/2019 9/29/2019   Type HeartMate3 HeartMate3 HeartMate3 HeartMate3 HeartMate3 HeartMate3 HeartMate3   Flow 4.3 4.3 4.1 4.2 4.1 4.2 4.1   Speed 5200 5200 5200 5200 5200 5200 5200   PI 3.1 3.1 3.3 3.4 3.2 3.0 3.4   Power (Orellana) 3.8 3.7 3.7 3.7 3.7 3.6 3.6   LSL 4800 4800 4800 4800 4800 4800 4800   Pulsatility Intermittent pulse Intermittent pulse Intermittent pulse Intermittent pulse Intermittent pulse Intermittent pulse Intermittent pulse

## 2019-09-30 NOTE — NURSING
"LVAD DLES dressing changed under sterile technique using soap and water. DLES is a "2" with scant yellow drainage noted around exit site. Pt tolerated well, no bleeding noted, no active drainage noted.  Dressing due to be changed 10/1/19.  Chest midline dressing and chest tube dressing changed with betadine. No drainage noted. Pt tolerated well.Will continue to monitor.  "

## 2019-09-30 NOTE — PT/OT/SLP PROGRESS
Occupational Therapy   Treatment and Discharge    Name: Deborah Navas  MRN: [id number]  Admitting Diagnosis:  LVAD (left ventricular assist device) present  6 Days Post-Op    Recommendations:     Discharge Recommendations: home  Discharge Equipment Recommendations:  none    Assessment:     Deborah aNvas is a 48 y.o. female with a medical diagnosis of LVAD (left ventricular assist device) present.   Performance deficits affecting function are weakness, impaired self care skills, impaired balance, impaired functional mobilty, impaired endurance, gait instability.   Pt tolerated session well with good effort and performance. Pt has achieved goals and no longer demo need for skilled OT at this time. Pt receptive to d/c of OT services.     Plan:     · D/C OT 9/30/2019     Subjective     Pain/Comfort:  · Pain Rating 1: 0/10    Objective:     Communicated with:Pt found seated in bed with LVAD to wall power.   General Precautions: Standard, fall, LVAD, sternal   Orthopedic Precautions:N/A     Occupational Performance:     Bed Mobility:    Independent bed mobs.     Functional Mobility/Transfers:  · Sit<>Stand from bed, chair and commode with independence.     Activities of Daily Living:  · Pt demo independence for basic ADL skills       [facility] 6 Click ADL: 24    Treatment & Education:  Self test completed to LVAD prior to OT arrival with documentation of numbers and battery rotation completed correctly.  OT introduced and fitted pt for LVAD james, but pt then decided she did not feel comfortable with this wearing accessory. Pt replaced items to shoulder consolidation bag.   Education provided re: OT POC and safety with functional mobility/ADL skills.     Patient left up in chair with all lines intact, call button in reach and nsg notifiedEducation:      GOALS:   Multidisciplinary Problems     Occupational Therapy Goals     Not on file          Multidisciplinary Problems (Resolved)        Problem:  Occupational Therapy Goal    Goal Priority Disciplines Outcome Interventions   Occupational Therapy Goal   (Resolved)     OT, PT/OT Met    Description:  Goals to be met by: 10/11/19     Patient will increase functional independence with ADLs by performing:    UE Dressing with Kidder.  LE Dressing with Kidder.  Grooming while standing at sink with Kidder.  Toileting from toilet with Kidder for hygiene and clothing management. - MET 9/26  Toilet transfer to toilet with Kidder. - MET 9/26  Increased functional strength to WFL for B UE.  Upper extremity exercise program x15 reps per handout, with independence.  Goals achieved                          Time Tracking:     OT Date of Treatment: 09/30/19  OT Start Time: 0900  OT Stop Time: 0928  OT Total Time (min): 28 min    Billable Minutes:Self Care/Home Management 28    JAGJIT Madison  9/30/2019

## 2019-09-30 NOTE — PROGRESS NOTES
09/30/2019  Trevin Sow    Current provider:  Jolene Lua MD      I, Trevin Sow, rounded on Deborah Navas to ensure all mechanical assist device settings (IABP or VAD) were appropriate and all parameters were within limits.  I was able to ensure all back up equipment was present, the staff had no issues, and the Perfusion Department daily rounding was complete.    7:31 AM

## 2019-10-01 VITALS
TEMPERATURE: 98 F | HEART RATE: 70 BPM | SYSTOLIC BLOOD PRESSURE: 76 MMHG | WEIGHT: 235 LBS | OXYGEN SATURATION: 95 % | HEIGHT: 67 IN | BODY MASS INDEX: 36.88 KG/M2 | RESPIRATION RATE: 16 BRPM

## 2019-10-01 PROBLEM — I48.92 ATRIAL FLUTTER: Status: RESOLVED | Noted: 2019-09-19 | Resolved: 2019-10-01

## 2019-10-01 LAB
ALBUMIN SERPL BCP-MCNC: 2.4 G/DL (ref 3.5–5.2)
ALP SERPL-CCNC: 75 U/L (ref 55–135)
ALT SERPL W/O P-5'-P-CCNC: 16 U/L (ref 10–44)
ANION GAP SERPL CALC-SCNC: 11 MMOL/L (ref 8–16)
APTT BLDCRRT: 45.7 SEC (ref 21–32)
AST SERPL-CCNC: 24 U/L (ref 10–40)
BASOPHILS # BLD AUTO: 0.05 K/UL (ref 0–0.2)
BASOPHILS NFR BLD: 0.7 % (ref 0–1.9)
BILIRUB SERPL-MCNC: 0.5 MG/DL (ref 0.1–1)
BUN SERPL-MCNC: 17 MG/DL (ref 6–20)
CALCIUM SERPL-MCNC: 8.8 MG/DL (ref 8.7–10.5)
CHLORIDE SERPL-SCNC: 99 MMOL/L (ref 95–110)
CO2 SERPL-SCNC: 27 MMOL/L (ref 23–29)
CREAT SERPL-MCNC: 1.3 MG/DL (ref 0.5–1.4)
DIFFERENTIAL METHOD: ABNORMAL
EOSINOPHIL # BLD AUTO: 0.3 K/UL (ref 0–0.5)
EOSINOPHIL NFR BLD: 4.7 % (ref 0–8)
ERYTHROCYTE [DISTWIDTH] IN BLOOD BY AUTOMATED COUNT: 15.9 % (ref 11.5–14.5)
EST. GFR  (AFRICAN AMERICAN): 55.7 ML/MIN/1.73 M^2
EST. GFR  (NON AFRICAN AMERICAN): 48.3 ML/MIN/1.73 M^2
GLUCOSE SERPL-MCNC: 106 MG/DL (ref 70–110)
HCT VFR BLD AUTO: 29.2 % (ref 37–48.5)
HGB BLD-MCNC: 8.7 G/DL (ref 12–16)
IMM GRANULOCYTES # BLD AUTO: 0.04 K/UL (ref 0–0.04)
IMM GRANULOCYTES NFR BLD AUTO: 0.6 % (ref 0–0.5)
INR PPP: 2 (ref 0.8–1.2)
LDH SERPL L TO P-CCNC: 307 U/L (ref 110–260)
LYMPHOCYTES # BLD AUTO: 1.1 K/UL (ref 1–4.8)
LYMPHOCYTES NFR BLD: 15.6 % (ref 18–48)
MAGNESIUM SERPL-MCNC: 2.2 MG/DL (ref 1.6–2.6)
MCH RBC QN AUTO: 26.8 PG (ref 27–31)
MCHC RBC AUTO-ENTMCNC: 29.8 G/DL (ref 32–36)
MCV RBC AUTO: 90 FL (ref 82–98)
MONOCYTES # BLD AUTO: 0.7 K/UL (ref 0.3–1)
MONOCYTES NFR BLD: 10 % (ref 4–15)
NEUTROPHILS # BLD AUTO: 4.8 K/UL (ref 1.8–7.7)
NEUTROPHILS NFR BLD: 68.4 % (ref 38–73)
NRBC BLD-RTO: 0 /100 WBC
PLATELET # BLD AUTO: 414 K/UL (ref 150–350)
PMV BLD AUTO: 8.9 FL (ref 9.2–12.9)
POTASSIUM SERPL-SCNC: 4.5 MMOL/L (ref 3.5–5.1)
PROT SERPL-MCNC: 6.3 G/DL (ref 6–8.4)
PROTHROMBIN TIME: 19 SEC (ref 9–12.5)
RBC # BLD AUTO: 3.25 M/UL (ref 4–5.4)
SODIUM SERPL-SCNC: 137 MMOL/L (ref 136–145)
WBC # BLD AUTO: 7.07 K/UL (ref 3.9–12.7)

## 2019-10-01 PROCEDURE — 36415 COLL VENOUS BLD VENIPUNCTURE: CPT | Mod: NTX

## 2019-10-01 PROCEDURE — 99238 HOSP IP/OBS DSCHRG MGMT 30/<: CPT | Mod: NTX,,, | Performed by: INTERNAL MEDICINE

## 2019-10-01 PROCEDURE — 25000003 PHARM REV CODE 250: Mod: NTX | Performed by: STUDENT IN AN ORGANIZED HEALTH CARE EDUCATION/TRAINING PROGRAM

## 2019-10-01 PROCEDURE — 83615 LACTATE (LD) (LDH) ENZYME: CPT | Mod: NTX

## 2019-10-01 PROCEDURE — 83735 ASSAY OF MAGNESIUM: CPT | Mod: NTX

## 2019-10-01 PROCEDURE — 25000003 PHARM REV CODE 250: Mod: NTX | Performed by: NURSE PRACTITIONER

## 2019-10-01 PROCEDURE — 25000003 PHARM REV CODE 250: Mod: NTX | Performed by: INTERNAL MEDICINE

## 2019-10-01 PROCEDURE — 85730 THROMBOPLASTIN TIME PARTIAL: CPT | Mod: NTX

## 2019-10-01 PROCEDURE — 93750 PR INTERROGATE VENT ASSIST DEV, IN PERSON, W PHYSICIAN ANALYSIS: ICD-10-PCS | Mod: NTX,,, | Performed by: INTERNAL MEDICINE

## 2019-10-01 PROCEDURE — 80053 COMPREHEN METABOLIC PANEL: CPT | Mod: NTX

## 2019-10-01 PROCEDURE — 99238 PR HOSPITAL DISCHARGE DAY,<30 MIN: ICD-10-PCS | Mod: NTX,,, | Performed by: INTERNAL MEDICINE

## 2019-10-01 PROCEDURE — 97803 MED NUTRITION INDIV SUBSEQ: CPT | Mod: NTX

## 2019-10-01 PROCEDURE — 27000248 HC VAD-ADDITIONAL DAY: Mod: NTX

## 2019-10-01 PROCEDURE — 85025 COMPLETE CBC W/AUTO DIFF WBC: CPT | Mod: NTX

## 2019-10-01 PROCEDURE — 85610 PROTHROMBIN TIME: CPT | Mod: NTX

## 2019-10-01 PROCEDURE — 93750 INTERROGATION VAD IN PERSON: CPT | Mod: NTX,,, | Performed by: INTERNAL MEDICINE

## 2019-10-01 RX ORDER — OXYCODONE AND ACETAMINOPHEN 5; 325 MG/1; MG/1
1 TABLET ORAL EVERY 4 HOURS PRN
Qty: 30 TABLET | Refills: 0 | Status: CANCELLED
Start: 2019-10-01

## 2019-10-01 RX ORDER — WARFARIN 3 MG/1
4.5 TABLET ORAL DAILY
Qty: 45 TABLET | Refills: 11 | Status: ON HOLD | OUTPATIENT
Start: 2019-10-01 | End: 2019-10-22 | Stop reason: SDUPTHER

## 2019-10-01 RX ORDER — GABAPENTIN 400 MG/1
400 CAPSULE ORAL 2 TIMES DAILY
Qty: 60 CAPSULE | Refills: 11 | Status: SHIPPED | OUTPATIENT
Start: 2019-10-01 | End: 2019-11-13 | Stop reason: DRUGHIGH

## 2019-10-01 RX ORDER — SPIRONOLACTONE 25 MG/1
12.5 TABLET ORAL DAILY
Qty: 15 TABLET | Refills: 11 | Status: SHIPPED | OUTPATIENT
Start: 2019-10-02 | End: 2019-10-07

## 2019-10-01 RX ORDER — ASPIRIN 325 MG
325 TABLET, DELAYED RELEASE (ENTERIC COATED) ORAL DAILY
Qty: 30 TABLET | Refills: 11 | Status: ON HOLD | OUTPATIENT
Start: 2019-10-02 | End: 2019-11-13 | Stop reason: SDUPTHER

## 2019-10-01 RX ORDER — OXYCODONE HCL 20 MG/1
20 TABLET, FILM COATED, EXTENDED RELEASE ORAL EVERY 12 HOURS
Refills: 0 | Status: ON HOLD
Start: 2019-10-01 | End: 2019-10-20 | Stop reason: CLARIF

## 2019-10-01 RX ORDER — PANTOPRAZOLE SODIUM 40 MG/1
40 TABLET, DELAYED RELEASE ORAL DAILY
Qty: 30 TABLET | Refills: 11 | Status: ON HOLD | OUTPATIENT
Start: 2019-10-02 | End: 2019-11-13 | Stop reason: SDUPTHER

## 2019-10-01 RX ORDER — MIRTAZAPINE 15 MG/1
15 TABLET, FILM COATED ORAL NIGHTLY
Qty: 30 TABLET | Refills: 11 | Status: ON HOLD | OUTPATIENT
Start: 2019-10-01 | End: 2019-11-13 | Stop reason: SDUPTHER

## 2019-10-01 RX ORDER — GABAPENTIN 400 MG/1
400 CAPSULE ORAL 2 TIMES DAILY
Qty: 60 CAPSULE | Refills: 11 | Status: SHIPPED | OUTPATIENT
Start: 2019-10-01 | End: 2019-10-01 | Stop reason: HOSPADM

## 2019-10-01 RX ORDER — AMOXICILLIN 250 MG
2 CAPSULE ORAL 2 TIMES DAILY PRN
Status: ON HOLD | COMMUNITY
Start: 2019-10-01 | End: 2023-05-31 | Stop reason: SDUPTHER

## 2019-10-01 RX ADMIN — OXYCODONE HYDROCHLORIDE 20 MG: 20 TABLET, FILM COATED, EXTENDED RELEASE ORAL at 09:10

## 2019-10-01 RX ADMIN — SPIRONOLACTONE 12.5 MG: 25 TABLET, FILM COATED ORAL at 09:10

## 2019-10-01 RX ADMIN — GABAPENTIN 400 MG: 100 CAPSULE ORAL at 09:10

## 2019-10-01 RX ADMIN — SIMETHICONE CHEW TAB 80 MG 80 MG: 80 TABLET ORAL at 02:10

## 2019-10-01 RX ADMIN — POLYETHYLENE GLYCOL 3350 17 G: 17 POWDER, FOR SOLUTION ORAL at 09:10

## 2019-10-01 RX ADMIN — PANTOPRAZOLE SODIUM 40 MG: 40 TABLET, DELAYED RELEASE ORAL at 09:10

## 2019-10-01 RX ADMIN — WARFARIN SODIUM 4.5 MG: 4 TABLET ORAL at 04:10

## 2019-10-01 RX ADMIN — ASPIRIN 325 MG: 325 TABLET, COATED ORAL at 09:10

## 2019-10-01 RX ADMIN — SIMETHICONE CHEW TAB 80 MG 80 MG: 80 TABLET ORAL at 09:10

## 2019-10-01 RX ADMIN — VENLAFAXINE 150 MG: 37.5 TABLET ORAL at 09:10

## 2019-10-01 RX ADMIN — SENNOSIDES AND DOCUSATE SODIUM 2 TABLET: 8.6; 5 TABLET ORAL at 09:10

## 2019-10-01 NOTE — PROGRESS NOTES
Patient is ready for discharge. Patient stable alert and oriented. IVs removed. No complaints of pain. Discussed discharge plan. Reviewed medications and side effects, appointments, and answered questions with patient and family. Oxycodone RX given to patient. Medications delivered to bedside. Home Equipment inventoried, Seen by team. Home supplies at bedside.

## 2019-10-01 NOTE — PROGRESS NOTES
[facility]  Heart Transplant  Progress Note    Patient Name: Deborah Navas  MRN: [id number]  Admission Date: 9/4/2019  Hospital Length of Stay: 27 days  Attending Physician: Vi Bryant MD  Primary Care Provider: Primary Doctor No  Principal Problem:LVAD (left ventricular assist device) present    Subjective:     Interval History: Feels well this morning, mild pain left sided chest pain with deep inspiration. No other issues.     Continuous Infusions:  Scheduled Meds:   aspirin  325 mg Oral Daily    gabapentin  400 mg Oral BID    mirtazapine  15 mg Oral QHS    oxyCODONE  20 mg Oral Q12H    pantoprazole  40 mg Oral Daily    polyethylene glycol  17 g Oral Daily    ramelteon  8 mg Oral QHS    senna-docusate 8.6-50 mg  2 tablet Oral BID    simethicone  1 tablet Oral QID (PC + HS)    spironolactone  12.5 mg Oral Daily    venlafaxine  150 mg Oral Daily    warfarin  4.5 mg Oral Daily     PRN Meds:albuterol sulfate, bisacodyl, glycerin 99.5% **AND** magnesium citrate **AND** sodium chloride 0.9%, hydrocortisone, hydrOXYzine HCl, lactulose, magnesium hydroxide 400 mg/5 ml, ondansetron, oxyCODONE-acetaminophen, promethazine (PHENERGAN) IVPB, simethicone, sodium chloride 0.9%    Review of patient's allergies indicates:   Allergen Reactions    Adhesive Blisters     Reaction to area in chest and up only    Codeine Itching     Objective:     Vital Signs (Most Recent):  Temp: 99.3 °F (37.4 °C) (10/01/19 1137)  Pulse: 105 (10/01/19 1508)  Resp: 18 (10/01/19 1137)  BP: (!) 76/0 (10/01/19 1527)  SpO2: 97 % (10/01/19 1137) Vital Signs (24h Range):  Temp:  [97.8 °F (36.6 °C)-99.3 °F (37.4 °C)] 99.3 °F (37.4 °C)  Pulse:  [] 105  Resp:  [15-18] 18  SpO2:  [97 %-98 %] 97 %  BP: (76-99)/(0-66) 76/0     Patient Vitals for the past 72 hrs (Last 3 readings):   Weight   10/01/19 0500 106.6 kg (235 lb 0.2 oz)   09/30/19 0506 107.2 kg (236 lb 5.3 oz)   09/29/19 0446 105.7 kg (233 lb 2.2 oz)      Body mass index is 36.81 kg/m².      Intake/Output Summary (Last 24 hours) at 10/1/2019 1600  Last data filed at 10/1/2019 1400  Gross per 24 hour   Intake 720 ml   Output 1800 ml   Net -1080 ml       Physical Exam   Constitutional: She is oriented to person, place, and time. She appears well-developed and well-nourished.   HENT:   Head: Normocephalic.   Eyes: Pupils are equal, round, and reactive to light.   Neck: Normal range of motion. Neck supple.   Cardiovascular: Normal rate and regular rhythm.   VAD hum   Pulmonary/Chest: Effort normal and breath sounds normal.   Abdominal: Soft. Bowel sounds are normal.   Musculoskeletal: Normal range of motion.   Neurological: She is alert and oriented to person, place, and time.   Skin: Skin is warm and dry.   Psychiatric: She has a normal mood and affect. Her behavior is normal.   Nursing note and vitals reviewed.      Significant Labs:  CBC:  Recent Labs   Lab 09/29/19 0433 09/30/19  0501 10/01/19  0501   WBC 6.18 7.70 7.07   RBC 3.51* 3.27* 3.25*   HGB 9.7* 8.8* 8.7*   HCT 32.2* 31.0* 29.2*   * 411* 414*   MCV 92 95 90   MCH 27.6 26.9* 26.8*   MCHC 30.1* 28.4* 29.8*     BNP:  Recent Labs   Lab 09/25/19  0335 09/27/19  0534 09/30/19  0501   * 515* 730*     CMP:  Recent Labs   Lab 09/29/19  0433 09/30/19  0501 10/01/19  0458    89 106   CALCIUM 8.9 8.2* 8.8   ALBUMIN 2.7* 2.4* 2.4*   PROT 6.5 6.2 6.3    135* 137   K 3.3* 4.7 4.5   CO2 30* 23 27   CL 94* 99 99   BUN 19 18 17   CREATININE 1.6* 1.2 1.3   ALKPHOS 78 69 75   ALT 15 15 16   AST 21 26 24   BILITOT 0.6 0.5 0.5      Coagulation:   Recent Labs   Lab 09/29/19  0433 09/30/19  0501 10/01/19  0458   INR 3.0* 2.4* 2.0*   APTT 45.3* 45.9* 45.7*     LDH:  Recent Labs   Lab 09/29/19  0433 09/30/19  0501 10/01/19  0458   * 303* 307*     Microbiology:  Microbiology Results (last 7 days)     ** No results found for the last 168 hours. **          I have reviewed all pertinent labs within  the past 24 hours.    Estimated Creatinine Clearance: 65.8 mL/min (based on SCr of 1.3 mg/dL).    Diagnostic Results:  CXR: X-ray Chest Pa And Lateral    Result Date: 10/1/2019  See above Electronically signed by: Trevin Foster MD Date:    10/01/2019 Time:    13:43    X-ray Chest Pa And Lateral    Result Date: 10/1/2019  Suspected mild increase in the volume of pleural fluid on the left with areas of suspected loculation noted. Extrapulmonary air/pneumothorax on the left again noted appearing stable. The right hemithorax appears stable. This report was flagged in Epic as abnormal. Electronically signed by: Jonathan Olguin Date:    10/01/2019 Time:    00:54    Assessment and Plan:     No notes on file    * LVAD (left ventricular assist device) present  -HeartMate 3 Implanted 9/10/19 as DT.  -HTS Primary.  -Implanted by Dr. Walden  -INR therapeutic. Goal INR 2.0-3.0. - therapeutic  -Antiplatelets:  mg.  -LDH is stable overall today. Will continue to monitor daily.  -Speed set at 5200  -Not listed for OHTx.  - weaned off.   - Education and teaching completed    Procedure: Device Interrogation Including analysis of device parameters  Current Settings: Ventricular Assist Device  Review of device function is stable    TXP LVAD INTERROGATIONS 10/1/2019 10/1/2019 10/1/2019 10/1/2019 10/1/2019 9/30/2019 9/30/2019   Type HeartMate3 HeartMate3 HeartMate3 HeartMate3 HeartMate3 HeartMate3 HeartMate3   Flow 4.0 4.2 4.2 4.3 4.1 4.2 4.2   Speed 5200 5200 5200 5200 5200 5200 5200   PI 3.0 3.1 3.1 3.1 3.3 3.3 3.2   Power (Orellana) 3.8 3.7 3.7 3.5 3.9 3.2 3.7   LSL - - 4800 4800 4800 4800 -   Pulsatility - - Intermittent pulse Intermittent pulse Intermittent pulse Intermittent pulse -       Pleural effusion  -Bilateral pleural effusions present, L>R.  - Drain placed left pleural space 9/27 with large amount of serosanguinous drainage  - Chest tube removed on 9/30  -CXR unchanged from yesterday      CKD (chronic kidney  disease)  - Kidney function improving. Continue to monitor    Abnormal CT scan  -focal opacity found at right base.  DDx: atelectasis.  Recommending repeat CT in 3 months    Enlarged thyroid  Ultrasound done and showed enlarged multinodular thyroid which warrants surveillance in one year.   - TSH and fT4 wnl    History of ventricular tachycardia  - Monitor electrolytes - K >4, Mg >2    Acute on chronic combined systolic and diastolic heart failure  NICM EF 20% s/p LVAD 9/10  -see s/p LVAD      ICD (implantable cardioverter-defibrillator) in place  -Medtronic CRT-D.     Plan to discharge today    Alphonse Valdes MD  Heart Transplant  [facility]

## 2019-10-01 NOTE — PROGRESS NOTES
"[facility]  Adult Nutrition  Progress Note    SUMMARY       Recommendations    Recommendation:   1. Continue cardiac diet as tolerated.   2. If pt continues with good PO intake > 75% of meals, suggest discontinuing Optisource.   3. RD to monitor & follow up.    Goals: Pt to receive nutrition by RD follow up  Nutrition Goal Status: goal met  Communication of RD Recs: other (comment)(POC)    Reason for Assessment    Reason For Assessment: RD follow-up  Diagnosis: surgery/postoperative complications(s/p LVAD 9/10)  Relevant Medical History: NICM, HLD  Interdisciplinary Rounds: attended  General Information Comments: Pt reports her appetite has greatly improved and that she is now eating % of meals. Pt states nausea has subsided and only returns when she is short of breath. Pt reports she stopped drinking Optisource once her intake increased. Pt continues to appear nourished, NFPE not indicated at this time.  Nutrition Discharge Planning: Adequate nutrition via PO intake.    Nutrition Risk Screen    Nutrition Risk Screen: no indicators present    Nutrition/Diet History    Patient Reported Diet/Restrictions/Preferences: low salt  Spiritual, Cultural Beliefs, Confucianism Practices, Values that Affect Care: no  Factors Affecting Nutritional Intake: decreased appetite, nausea/vomiting, abdominal pain    Anthropometrics    Temp: 99.3 °F (37.4 °C)  Height Method: Stated  Height: 5' 7" (170.2 cm)  Height (inches): 67 in  Weight Method: Standard Scale  Weight: 106.6 kg (235 lb 0.2 oz)  Weight (lb): 235.01 lb  Ideal Body Weight (IBW), Female: 135 lb  % Ideal Body Weight, Female (lb): 174.81 lb  BMI (Calculated): 37  BMI Grade: 35 - 39.9 - obesity - grade II  Usual Body Weight (UBW), k.5 kg  % Usual Body Weight: 99.15       Lab/Procedures/Meds    Pertinent Labs Reviewed: reviewed  Pertinent Labs Comments: GFR 48.3, Alb 2.4  Pertinent Medications Reviewed: reviewed  Pertinent Medications Comments: " lactulose, mirtazapine, pantoprazole, warfarin    Estimated/Assessed Needs    Weight Used For Calorie Calculations: 106.6 kg (235 lb 0.2 oz)  Energy Calorie Requirements (kcal): 2155 kcal/day  Energy Need Method: Ogden-St Jeor(x 1.25 PAL)  Protein Requirements: 107-128 g/day(1-1.2 g/kg)  Weight Used For Protein Calculations: 106.6 kg (235 lb 0.2 oz)  Fluid Requirements (mL): 1 mL/kcal or per MD  Estimated Fluid Requirement Method: RDA Method  RDA Method (mL): 2155    Nutrition Prescription Ordered    Current Diet Order: Cardiac  Nutrition Order Comments: -  Oral Nutrition Supplement: Optisource TID    Evaluation of Received Nutrient/Fluid Intake    I/O: -20.2L since admit  Energy Calories Required: meeting needs  Protein Required: meeting needs  Fluid Required: meeting needs  Comments: LBM 9/30  Tolerance: tolerating  % Intake of Estimated Energy Needs: 75 - 100 %  % Meal Intake: 75 - 100 %    Nutrition Risk    Level of Risk/Frequency of Follow-up: (1x/week)     Assessment and Plan    Nutrition Problem  Increased nutrient needs     Related to (etiology):   Physiological causes related to healing     Signs and Symptoms (as evidenced by):   S/p LVAD placement 9/10      Interventions (treatment strategy):  Collaboration of nutrition care with other providers     Nutrition Diagnosis Status:   Continues    Monitor and Evaluation    Food and Nutrient Intake: energy intake, food and beverage intake  Food and Nutrient Adminstration: diet order  Knowledge/Beliefs/Attitudes: food and nutrition knowledge/skill  Physical Activity and Function: nutrition-related ADLs and IADLs  Anthropometric Measurements: weight, weight change  Biochemical Data, Medical Tests and Procedures: electrolyte and renal panel, gastrointestinal profile, inflammatory profile  Nutrition-Focused Physical Findings: overall appearance     Malnutrition Assessment  Pt does not meet criteria for malnutrition at this time.    Nutrition Follow-Up    RD  Follow-up?: Yes

## 2019-10-01 NOTE — PROGRESS NOTES
10/01/2019  Trevin Sow    Current provider:  Vi Bryant MD      I, Trevin Sow, rounded on Deborah Oliva Navas to ensure all mechanical assist device settings (IABP or VAD) were appropriate and all parameters were within limits.  I was able to ensure all back up equipment was present, the staff had no issues, and the Perfusion Department daily rounding was complete.    7:34 AM

## 2019-10-01 NOTE — ASSESSMENT & PLAN NOTE
-HeartMate 3 Implanted 9/10/19 as DT.  -HTS Primary.  -Implanted by Dr. Walden  -INR therapeutic. Goal INR 2.0-3.0. - therapeutic  -Antiplatelets:  mg.  -LDH is stable overall today. Will continue to monitor daily.  -Speed set at 5200  -Not listed for OHTx.  - weaned off.   - Education and teaching completed    Procedure: Device Interrogation Including analysis of device parameters  Current Settings: Ventricular Assist Device  Review of device function is stable    TXP LVAD INTERROGATIONS 10/1/2019 10/1/2019 10/1/2019 10/1/2019 10/1/2019 9/30/2019 9/30/2019   Type HeartMate3 HeartMate3 HeartMate3 HeartMate3 HeartMate3 HeartMate3 HeartMate3   Flow 4.0 4.2 4.2 4.3 4.1 4.2 4.2   Speed 5200 5200 5200 5200 5200 5200 5200   PI 3.0 3.1 3.1 3.1 3.3 3.3 3.2   Power (Orellana) 3.8 3.7 3.7 3.5 3.9 3.2 3.7   LSL - - 4800 4800 4800 4800 -   Pulsatility - - Intermittent pulse Intermittent pulse Intermittent pulse Intermittent pulse -

## 2019-10-01 NOTE — PLAN OF CARE
Recommendations     Recommendation:   1. Continue cardiac diet as tolerated.   2. If pt continues with good PO intake > 75% of meals, suggest discontinuing Optisource.   3. RD to monitor & follow up.     Goals: Pt to receive nutrition by RD follow up  Nutrition Goal Status: goal met  Communication of RD Recs: other (comment)(POC)

## 2019-10-01 NOTE — PROGRESS NOTES
Patient's Home dressing supplies checked at the bedside with RN and patient. All supplies accounted for and extra given to patient at her request to calm anxiety. Will continue to monitor.

## 2019-10-01 NOTE — SUBJECTIVE & OBJECTIVE
Interval History: Feels well this morning, mild pain left sided chest pain with deep inspiration. No other issues.     Continuous Infusions:  Scheduled Meds:   aspirin  325 mg Oral Daily    gabapentin  400 mg Oral BID    mirtazapine  15 mg Oral QHS    oxyCODONE  20 mg Oral Q12H    pantoprazole  40 mg Oral Daily    polyethylene glycol  17 g Oral Daily    ramelteon  8 mg Oral QHS    senna-docusate 8.6-50 mg  2 tablet Oral BID    simethicone  1 tablet Oral QID (PC + HS)    spironolactone  12.5 mg Oral Daily    venlafaxine  150 mg Oral Daily    warfarin  4.5 mg Oral Daily     PRN Meds:albuterol sulfate, bisacodyl, glycerin 99.5% **AND** magnesium citrate **AND** sodium chloride 0.9%, hydrocortisone, hydrOXYzine HCl, lactulose, magnesium hydroxide 400 mg/5 ml, ondansetron, oxyCODONE-acetaminophen, promethazine (PHENERGAN) IVPB, simethicone, sodium chloride 0.9%    Review of patient's allergies indicates:   Allergen Reactions    Adhesive Blisters     Reaction to area in chest and up only    Codeine Itching     Objective:     Vital Signs (Most Recent):  Temp: 99.3 °F (37.4 °C) (10/01/19 1137)  Pulse: 105 (10/01/19 1508)  Resp: 18 (10/01/19 1137)  BP: (!) 76/0 (10/01/19 1527)  SpO2: 97 % (10/01/19 1137) Vital Signs (24h Range):  Temp:  [97.8 °F (36.6 °C)-99.3 °F (37.4 °C)] 99.3 °F (37.4 °C)  Pulse:  [] 105  Resp:  [15-18] 18  SpO2:  [97 %-98 %] 97 %  BP: (76-99)/(0-66) 76/0     Patient Vitals for the past 72 hrs (Last 3 readings):   Weight   10/01/19 0500 106.6 kg (235 lb 0.2 oz)   09/30/19 0506 107.2 kg (236 lb 5.3 oz)   09/29/19 0446 105.7 kg (233 lb 2.2 oz)     Body mass index is 36.81 kg/m².      Intake/Output Summary (Last 24 hours) at 10/1/2019 1600  Last data filed at 10/1/2019 1400  Gross per 24 hour   Intake 720 ml   Output 1800 ml   Net -1080 ml       Physical Exam   Constitutional: She is oriented to person, place, and time. She appears well-developed and well-nourished.   HENT:   Head:  Normocephalic.   Eyes: Pupils are equal, round, and reactive to light.   Neck: Normal range of motion. Neck supple.   Cardiovascular: Normal rate and regular rhythm.   VAD hum   Pulmonary/Chest: Effort normal and breath sounds normal.   Abdominal: Soft. Bowel sounds are normal.   Musculoskeletal: Normal range of motion.   Neurological: She is alert and oriented to person, place, and time.   Skin: Skin is warm and dry.   Psychiatric: She has a normal mood and affect. Her behavior is normal.   Nursing note and vitals reviewed.      Significant Labs:  CBC:  Recent Labs   Lab 09/29/19 0433 09/30/19  0501 10/01/19  0501   WBC 6.18 7.70 7.07   RBC 3.51* 3.27* 3.25*   HGB 9.7* 8.8* 8.7*   HCT 32.2* 31.0* 29.2*   * 411* 414*   MCV 92 95 90   MCH 27.6 26.9* 26.8*   MCHC 30.1* 28.4* 29.8*     BNP:  Recent Labs   Lab 09/25/19  0335 09/27/19  0534 09/30/19  0501   * 515* 730*     CMP:  Recent Labs   Lab 09/29/19 0433 09/30/19  0501 10/01/19  0458    89 106   CALCIUM 8.9 8.2* 8.8   ALBUMIN 2.7* 2.4* 2.4*   PROT 6.5 6.2 6.3    135* 137   K 3.3* 4.7 4.5   CO2 30* 23 27   CL 94* 99 99   BUN 19 18 17   CREATININE 1.6* 1.2 1.3   ALKPHOS 78 69 75   ALT 15 15 16   AST 21 26 24   BILITOT 0.6 0.5 0.5      Coagulation:   Recent Labs   Lab 09/29/19 0433 09/30/19  0501 10/01/19  0458   INR 3.0* 2.4* 2.0*   APTT 45.3* 45.9* 45.7*     LDH:  Recent Labs   Lab 09/29/19 0433 09/30/19  0501 10/01/19  0458   * 303* 307*     Microbiology:  Microbiology Results (last 7 days)     ** No results found for the last 168 hours. **          I have reviewed all pertinent labs within the past 24 hours.    Estimated Creatinine Clearance: 65.8 mL/min (based on SCr of 1.3 mg/dL).    Diagnostic Results:  CXR: X-ray Chest Pa And Lateral    Result Date: 10/1/2019  See above Electronically signed by: Trevin Foster MD Date:    10/01/2019 Time:    13:43    X-ray Chest Pa And Lateral    Result Date: 10/1/2019  Suspected mild  increase in the volume of pleural fluid on the left with areas of suspected loculation noted. Extrapulmonary air/pneumothorax on the left again noted appearing stable. The right hemithorax appears stable. This report was flagged in Epic as abnormal. Electronically signed by: Jonathan Olguin Date:    10/01/2019 Time:    00:54

## 2019-10-01 NOTE — PROGRESS NOTES
"Called into room by patient she had questioned emergency bag use. She stated that when she walks in the halls she does not have her emergency bag with her and that therapy does not use it either. She stated that because it weighs more than 5 pounds she cannot carry it and that it will make her off balance. Informed patient she should always have her emergency bag on her in the case of an emergency the supplies needed to be readily accessible. Informed her that while yes she is on sternal precautions, this is the exception. She should put her emergency bag on while sitting as to not "lift" 5 pounds and she should generally be doing things with another person that person is also able to carry bag as well. Spoke with therapy and they stated that she has repeatedly questioned this, therapy has always taken emergency bag with them when ambulating and reinforced the education as well. Verified with Nicole VAD Coordinator as well. Patient vibalized understanding but will be reinforced teaching provided.   "

## 2019-10-01 NOTE — HPI
47 yo female with hx NICM: EF 20%, LVEDD: 6.8cm, V-fib reported in setting of hypokalemia with appropriate shock from ICD (on Amiodarone), HLP, being admitted from procedure room for management of cardiogenic shock and workup for advanced options.  Patient initally say Dr. Zarate in clinic 8/27/19 as initial consult for advanced options.  At time, she reported feeling fine with only complaint of fatigue which was at baseline.         She had a RHC today which showed: RA: 20/ 20/ 18 RV: 60/ 8/ 18 PA: 60/ 32/ 45 PWP: 43/ 70/ 40 . Cardiac output was 2.27 by Fitz. Cardiac index is 1.04 L/min/m2. O2 Sat: PA 34%. AO 95% PVR 2.2 PALMER.  She is being admitted for diuresis, inotropic support (if she tolerates it from the arrhythmia standpoint), and work up for advanced options

## 2019-10-01 NOTE — PROGRESS NOTES
DISCHARGE NOTE:    Deborah Navas is a 48 y.o. female s/p HM3 lvad from 9/19 being discharged today following pump implantation.      Past Medical History:   Diagnosis Date    Fractures     History of ventricular fibrillation 9/4/2019    History of ventricular tachycardia 9/4/2019    Hyperlipidemia     Migraine     Osteoarthritis        Hospital Course: During her hospital stay she was started on full dose aspiring and warfarin to inr goal of 2-3. Her gabapentin was restarted for chronic arm nerve pain. Oxycodone ER was weaned to 20mg twice daily with prn oxy IR.      Allergies:   Review of patient's allergies indicates:   Allergen Reactions    Adhesive Blisters     Reaction to area in chest and up only    Codeine Itching       Patient Pharmacy: [facility]     Pharmacy Interventions/Recommendations:  1) INR Goal: 2-3    2) Antiplatelet Agents: ASA 325mg daily     3) Heparin Bridging:  UFH    4) Patient Counseling/Education:  Completed, new medication administration card provided     5) INR Follow-Up/Discharge Needs:  Thursday with coumadin clinic and home health     See list of discharge medication for dosing instructions.     Deborah Navas and her caregiver verbalized their understanding and had the opportunity to ask questions.      Discharge Medications:   Deborah Navas   Home Medication Instructions MARLEY:[id number]    Printed on:10/01/19 8390   Medication Information                      aspirin (ECOTRIN) 325 MG EC tablet  Take 1 tablet (325 mg total) by mouth once daily.             atorvastatin (LIPITOR) 40 MG tablet  Take 40 mg by mouth once daily.             gabapentin (NEURONTIN) 400 MG capsule  Take 1 capsule (400 mg total) by mouth 2 (two) times daily.             mirtazapine (REMERON) 15 MG tablet  Take 1 tablet (15 mg total) by mouth every evening.             pantoprazole (PROTONIX) 40 MG tablet  Take 1 tablet (40 mg total) by mouth once daily.              senna-docusate 8.6-50 mg (PERICOLACE) 8.6-50 mg per tablet  Take 2 tablets by mouth 2 (two) times daily as needed for Constipation.             spironolactone (ALDACTONE) 25 MG tablet  Take 0.5 tablets (12.5 mg total) by mouth once daily.             VENLAFAXINE HCL (EFFEXOR ORAL)  Take 150 mg by mouth once daily.             warfarin (COUMADIN) 3 MG tablet  Take 1.5 tablets (4.5 mg total) by mouth Daily.

## 2019-10-01 NOTE — PLAN OF CARE
Plan of care discussed with patient.  Patient ambulating independently, fall precautions in place.Continuing to encourage sternal precautions, IS, and ambulation. LVAD DP and numbers WNL, smooth LVAD hum. Radha PA and Lateral ordered. Reviewed emergency bag. Plan to D/C after PA and Lateral results unless contraindicated. Patient has no complaints of pain. Discussed medications and care. Encouraged workbook, reviewed education, filled in binder. Patient has no questions at this time. Will continue to monitor.

## 2019-10-01 NOTE — PROGRESS NOTES
Discharge Note:    BENIGNO met with pt and mother in pt's room in order to review discharge plan. Pt AAOx4, pleasant, and excited to finally be able to return home to [place] post LVAD. Pt's mother will transport pt home. Pt reports that primary caregiver will be friend Flavia will do the dressing changes. SW notified pt that HH with [facility] set up and that someone from the agency will reach out to pt to schedule first visit. Pt expressed understanding. Pt anxious but coping adequately. SW remains available for continued psychosocial support, education, resources, and additional d/c planning as needed.    BENIGNO contacted [facility] in [place] (ph: [phone], f: [phone]) and spoke with Selena who confirmed orders already received. Selena requesting updated notes from OT as pt no longer needing. BENIGNO faxed pt's updated PT and OT notes and most recent progress note. Selena reports that LVAD coordinator contacted agency today as well and noted no issues. BENIGNO remains available for any follow up if needed.

## 2019-10-01 NOTE — DISCHARGE SUMMARY
[facility]  Heart Transplant  Discharge Summary      Patient Name: Deborah Navas  MRN: [id number]  Admission Date: 9/4/2019  Hospital Length of Stay: 27 days  Discharge Date and Time: 10/01/2019 4:53 PM  Attending Physician: Vi Bryant MD   Discharging Provider: Alphonse Valdes MD  Primary Care Provider: Primary Doctor No     HPI: 47 yo female with hx NICM: EF 20%, LVEDD: 6.8cm, V-fib reported in setting of hypokalemia with appropriate shock from ICD (on Amiodarone), HLP, being admitted from procedure room for management of cardiogenic shock and workup for advanced options.  Patient initally say Dr. Zarate in clinic 8/27/19 as initial consult for advanced options.  At time, she reported feeling fine with only complaint of fatigue which was at baseline.         She had a RHC today which showed: RA: 20/ 20/ 18 RV: 60/ 8/ 18 PA: 60/ 32/ 45 PWP: 43/ 70/ 40 . Cardiac output was 2.27 by Fitz. Cardiac index is 1.04 L/min/m2. O2 Sat: PA 34%. AO 95% PVR 2.2 PALMER.  She is being admitted for diuresis, inotropic support (if she tolerates it from the arrhythmia standpoint), and work up for advanced options    Procedure(s) (LRB):  CARDIOVERSION (N/A)  ECHOCARDIOGRAM,TRANSESOPHAGEAL (N/A)     Hospital Course: Patient admitted after a RHC showed elevated filling pressures and low CI. She was started on dobutamine infusion and evaluation for advanced options was initiated. She was accepted for VAD and underwent LVAD implantation on 9/10/2019. She had an uncomplicated postoperative course and downgraded to floor within 3 days. She continued to improve. Left pleural effusion was noted and chest tube was inserted. Chest tube removed on 9/30 with stable small pleural effusion on 10/1. Stable for discharge.     Consults (From admission, onward)        Status Ordering Provider     Inpatient consult to Cardiothoracic Surgery  Once     Provider:  (Not yet assigned)    Completed ANIVAL ZHU     Inpatient  consult to Electrophysiology  Once     Provider:  (Not yet assigned)    Completed BLANCA COMBS     Inpatient consult to Endocrinology  Once     Provider:  (Not yet assigned)    Completed ALEXA ABDUL     Inpatient consult to Gynecology  Once     Provider:  (Not yet assigned)    Completed ANIVAL ZHU     Inpatient consult to Interventional Radiology  Once     Provider:  (Not yet assigned)    Completed ANIVAL ZHU     Inpatient consult to Midline team  Once     Provider:  (Not yet assigned)    Completed ALEXA ABDUL     Inpatient consult to Palliative Care  Once     Provider:  (Not yet assigned)    Completed NELY LAGUNA     Inpatient consult to PICC team ([facility])  Once     Provider:  (Not yet assigned)    Completed ALEXA ABDUL     Inpatient consult to PICC team ([facility])  Once     Provider:  (Not yet assigned)    Completed MARK MENDIETA     Inpatient Consult to Pre-VAD Coordinator  Once     Provider:  (Not yet assigned)    Completed ANIVAL ZHU     Inpatient consult to Registered Dietitian/Nutritionist  Once     Provider:  (Not yet assigned)    Completed ANIVAL ZHU     Inpatient consult to Registered Dietitian/Nutritionist  Once     Provider:  (Not yet assigned)    Completed ALEXA ABDUL     Inpatient consult to Registered Dietitian/Nutritionist  Once     Provider:  (Not yet assigned)    Completed MARK MENDIETA     Inpatient consult to Social Work/Case Management  Once     Provider:  (Not yet assigned)    Acknowledged MARGARITA NELSON     Inpatient Consult to Transplant Coordinator  Once     Provider:  (Not yet assigned)    Completed ANIVAL ZHU          Final Active Diagnoses:    Diagnosis Date Noted POA    PRINCIPAL PROBLEM:  LVAD (left ventricular assist device) present [Z95.811] 09/10/2019 Not Applicable    Pleural effusion [J90] 09/16/2019 No    Anticoagulation monitoring, INR range 2-3 [Z79.01]  Not Applicable    Palliative care encounter [Z51.5]  09/10/2019 Not Applicable    Goals of care, counseling/discussion [Z71.89]  Not Applicable    CHF (congestive heart failure) [I50.9]  Yes    Chronic systolic congestive heart failure [I50.22]  Yes    Systolic HF (heart failure) [I50.20]  Yes    CKD (chronic kidney disease) [N18.9] 09/07/2019 Yes    Enlarged thyroid [E04.9] 09/05/2019 Yes    Abnormal CT scan [R93.89] 09/05/2019 Yes    Acute on chronic combined systolic and diastolic heart failure [I50.43] 09/04/2019 Yes    History of ventricular fibrillation [Z86.79] 09/04/2019 Not Applicable    History of ventricular tachycardia [Z86.79] 09/04/2019 Not Applicable    Congestive cardiomyopathy [I42.0]  Yes    ICD (implantable cardioverter-defibrillator) in place [Z95.810] 07/20/2018 Yes    Ventricular fibrillation [I49.01] 10/28/2017 Yes      Problems Resolved During this Admission:    Diagnosis Date Noted Date Resolved POA    Atrial flutter [I48.92] 09/19/2019 10/01/2019 Yes    Orthostatic hypotension [I95.1]  10/01/2019 No    Bradycardia [R00.1]  10/01/2019 No    Constipation [K59.00]  10/01/2019 No    Encounter for weaning from ventilator [Z99.11]  10/01/2019 Not Applicable    Acute hyperglycemia [R73.9] 09/10/2019 09/18/2019 No    Ventricular tachyarrhythmia [I47.2] 07/20/2018 09/24/2019 Yes      Discharged Condition: stable    Disposition: Home or Self Care    Follow Up:  - LVAD clinic in one week    Patient Instructions:      Ambulatory referral to Anticoagulation Monitoring   Referral Priority: Routine Referral Type: Consultation   Referral Reason: Specialty Services Required   Requested Specialty: Cardiology   Number of Visits Requested: 1     Diet Cardiac     Notify your health care provider if you experience any of the following:  temperature >100.4     Notify your health care provider if you experience any of the following:  persistent nausea and vomiting or diarrhea     Notify your health care provider if you experience any of the  following:  severe uncontrolled pain     Notify your health care provider if you experience any of the following:  redness, tenderness, or signs of infection (pain, swelling, redness, odor or green/yellow discharge around incision site)     Notify your health care provider if you experience any of the following:  difficulty breathing or increased cough     Notify your health care provider if you experience any of the following:  severe persistent headache     Notify your health care provider if you experience any of the following:  worsening rash     Notify your health care provider if you experience any of the following:  persistent dizziness, light-headedness, or visual disturbances     Notify your health care provider if you experience any of the following:     Notify your health care provider if you experience any of the following:  increased confusion or weakness     Activity as tolerated     Medications:  Reconciled Home Medications:      Medication List      START taking these medications    gabapentin 400 MG capsule  Commonly known as:  NEURONTIN  Take 1 capsule (400 mg total) by mouth 2 (two) times daily.  Replaces:  gabapentin 800 MG tablet     mirtazapine 15 MG tablet  Commonly known as:  REMERON  Take 1 tablet (15 mg total) by mouth every evening.     oxyCODONE 20 mg 12 hr tablet  Commonly known as:  OXYCONTIN  Take 1 tablet (20 mg total) by mouth every 12 (twelve) hours.     pantoprazole 40 MG tablet  Commonly known as:  PROTONIX  Take 1 tablet (40 mg total) by mouth once daily.  Start taking on:  October 2, 2019     senna-docusate 8.6-50 mg 8.6-50 mg per tablet  Commonly known as:  PERICOLACE  Take 2 tablets by mouth 2 (two) times daily as needed for Constipation.     warfarin 3 MG tablet  Commonly known as:  COUMADIN  Take 1.5 tablets (4.5 mg total) by mouth Daily.        CHANGE how you take these medications    aspirin 325 MG EC tablet  Commonly known as:  ECOTRIN  Take 1 tablet (325 mg total) by  mouth once daily.  Start taking on:  October 2, 2019  What changed:    · medication strength  · how much to take  · when to take this     spironolactone 25 MG tablet  Commonly known as:  ALDACTONE  Take 0.5 tablets (12.5 mg total) by mouth once daily.  Start taking on:  October 2, 2019  What changed:    · how much to take  · when to take this        CONTINUE taking these medications    atorvastatin 40 MG tablet  Commonly known as:  LIPITOR  Take 40 mg by mouth once daily.     EFFEXOR ORAL  Take 150 mg by mouth once daily.        STOP taking these medications    AMBIEN ORAL     amiodarone 200 MG Tab  Commonly known as:  PACERONE     docusate sodium 100 MG capsule  Commonly known as:  COLACE     furosemide 40 MG tablet  Commonly known as:  LASIX     gabapentin 800 MG tablet  Commonly known as:  NEURONTIN  Replaced by:  gabapentin 400 MG capsule     loratadine 10 mg tablet  Commonly known as:  CLARITIN     metOLazone 5 MG tablet  Commonly known as:  ZAROXOLYN     NOREL AD 4- mg Tab  Generic drug:  cpm-phenyleph-acetaminophen     oxyCODONE-acetaminophen 5-325 mg per tablet  Commonly known as:  PERCOCET     potassium chloride 20 mEq  Commonly known as:  K-TAB     promethazine 25 MG tablet  Commonly known as:  PHENERGAN            Alphonse Valdes MD  Heart Transplant  [facility]

## 2019-10-01 NOTE — NURSING TRANSFER
Nursing Transfer Note      10/1/2019     Transfer From: Radiology    Transfer via wheelchair    Transfer with cardiac monitoring LVAD Emergency Bag    Transported by transport    Medicines sent: none    Chart send with patient: No    Notified: mother    Patient reassessed at: 1330 10/1/2019     Upon arrival to floor: cardiac monitor applied, patient oriented to room, call bell in reach and bed in lowest position

## 2019-10-01 NOTE — PROGRESS NOTES
Seen pt in hospital. Inventoried pt's equipment for discharge, all equipment is accounted for. Pt verbalized understanding of LVAD system and components. Will follow up in clinic.

## 2019-10-01 NOTE — ASSESSMENT & PLAN NOTE
-Bilateral pleural effusions present, L>R.  - Drain placed left pleural space 9/27 with large amount of serosanguinous drainage  - Chest tube removed on 9/30  -CXR unchanged from yesterday

## 2019-10-01 NOTE — NURSING TRANSFER
Nursing Transfer Note      10/1/2019     Transfer To: Radiology    Transfer via wheelchair    Transfer with cardiac monitoring and Emergency Bag    Transported by Transport    Medicines sent: none    Chart send with patient: No

## 2019-10-01 NOTE — HOSPITAL COURSE
Patient admitted after a RHC showed elevated filling pressures and low CI. She was started on dobutamine infusion and evaluation for advanced options was initiated. She was accepted for VAD and underwent LVAD implantation on 9/10/2019. She had an uncomplicated postoperative course and downgraded to floor within 3 days. She continued to improve. Left pleural effusion was noted and chest tube was inserted. Chest tube removed on 9/30 with stable small pleural effusion on 10/1. Stable for discharge.

## 2019-10-01 NOTE — PROGRESS NOTES
"LVAD dressing change completed using sterile technique with soap and water. DLES is a "2" with minimal clear drainage noted on the drain sponge, team aware. Tolerated without any complication. Sutures remain intact, no redness, or tenderness noted.     MSI dressing and Chest tube dressings removed and cleansed with remaining LVAD supplies of soap and water, water, and dry gauze. Educated patient and family on how to properly clean and gave them their post op instructions. Informed of when to notify team. Answered all questions.   "

## 2019-10-01 NOTE — PLAN OF CARE
Pt AAO and VSS. Pt chest tube removed today. Pain controlled with scheduled pain meds+simethicone for gas. Pt educated on fall risk overnight,pt remained free from falls/trauma/injury. Denies chest pain, SOB, palpitations, dizziness, pain, or discomfort. Plan of care reviewed with pt, all questions answered. Bed locked in lowest position, call bell within reach, no acute distress noted, will continue to monitor.

## 2019-10-02 ENCOUNTER — TELEPHONE (OUTPATIENT)
Dept: TRANSPLANT | Facility: CLINIC | Age: 49
End: 2019-10-02

## 2019-10-02 ENCOUNTER — ANTI-COAG VISIT (OUTPATIENT)
Dept: CARDIOLOGY | Facility: CLINIC | Age: 49
End: 2019-10-02

## 2019-10-02 DIAGNOSIS — Z95.811 LVAD (LEFT VENTRICULAR ASSIST DEVICE) PRESENT: ICD-10-CM

## 2019-10-02 DIAGNOSIS — Z79.01 LONG TERM (CURRENT) USE OF ANTICOAGULANTS: ICD-10-CM

## 2019-10-02 LAB — BSA FOR ECHO PROCEDURE: 2.24 M2

## 2019-10-02 NOTE — PROGRESS NOTES
48 y.o. female s/p HM3 lvad from 9/19 being discharged today following pump implantation.  Pt's PMHx: CHF, HLD. During her hospital stay she was started on full dose aspirin and warfarin to inr goal of 2-3. Her gabapentin was restarted for chronic arm nerve pain. Oxycodone ER was weaned to 20mg twice daily with prn oxy IR.   INR  10/3 via  per Analisa. VM full on home phone. lvm on cell, will need to confirm dose with INR 10/3

## 2019-10-03 ENCOUNTER — PATIENT OUTREACH (OUTPATIENT)
Dept: ADMINISTRATIVE | Facility: CLINIC | Age: 49
End: 2019-10-03

## 2019-10-03 NOTE — PATIENT INSTRUCTIONS
Sepsis     To treat sepsis, antibiotics and fluids may by given through an intravenous (IV) line.     Sepsis happens when your body responds with widespread inflammation to a bad infection or bacteremia--the presence of bacteria in your bloodstream. Sepsis can be deadly. Blood pressure may drop and the lungs and kidneys may start to fail. Emergency care for sepsis is crucial.  Risk factors  Those most at risk for sepsis are:  · Infants or older adults  · People who have an illness that weakens their immune system, such as cancer, AIDS, or diabetes  · People being treated with chemotherapy medicines or radiation, which weakens the immune system  · People who have had a transplant  · People with a very severe infection such as pneumonia, meningitis, or a urinary tract infection  When to go to the emergency department (ED)  Sepsis is an emergency. Go to the nearest ED if you have a fever with any of these symptoms:  · Chills and shaking  · Rapid heartbeat and breathing  · Trouble breathing  · Severe nausea or uncontrolled vomiting  · Confusion, disorientation, drowsiness, or dizziness  · Decreased urination  · Severe pain, including in the back or joints   What to expect in the ED  · Blood and urine tests are done to look for bacteria. They also check for organ failure.  · Blood, urine, or sputum cultures may be taken. The samples are sent to a lab. They are placed in a special container. Any bacteria should grow in 24 hours.  · X-rays or other imaging tests may be done.  A person with sepsis will be admitted to the hospital and treated with antibiotics. Treatment may also include oxygen and intravenous fluids.  Date Last Reviewed: 10/1/2016  © 4738-8818 [employer]. [address] [place] [address], [place] [address]. All rights reserved. This information is not intended as a substitute for professional medical care. Always follow your healthcare professional's instructions.

## 2019-10-04 ENCOUNTER — ANTI-COAG VISIT (OUTPATIENT)
Dept: CARDIOLOGY | Facility: CLINIC | Age: 49
End: 2019-10-04
Payer: COMMERCIAL

## 2019-10-04 LAB — INR PPP: 3.8

## 2019-10-04 PROCEDURE — 93793 PR ANTICOAGULANT MGMT FOR PT TAKING WARFARIN: ICD-10-PCS | Mod: S$GLB,,,

## 2019-10-04 PROCEDURE — 93793 ANTICOAG MGMT PT WARFARIN: CPT | Mod: S$GLB,,,

## 2019-10-04 NOTE — PROGRESS NOTES
INR verbal result given by Zena RN at [facility].  Finger stick today PT/INR  was 45.7/3.8.  I have asked her to use  going forward.  Took INR as we have not had a result since discharge.  Hold dose today and lower for now.  Will adjust after new result on Monday.  Zena states that she was fairly lethargic and sleepy during her visit this morning, unsure of dietary intake.  [place] [facility] [place]

## 2019-10-07 ENCOUNTER — OFFICE VISIT (OUTPATIENT)
Dept: TRANSPLANT | Facility: CLINIC | Age: 49
End: 2019-10-07
Attending: INTERNAL MEDICINE
Payer: COMMERCIAL

## 2019-10-07 ENCOUNTER — HOSPITAL ENCOUNTER (OUTPATIENT)
Dept: RADIOLOGY | Facility: HOSPITAL | Age: 49
Discharge: HOME | End: 2019-10-07
Attending: INTERNAL MEDICINE
Payer: COMMERCIAL

## 2019-10-07 ENCOUNTER — CLINICAL SUPPORT (OUTPATIENT)
Dept: TRANSPLANT | Facility: CLINIC | Age: 49
End: 2019-10-07
Payer: COMMERCIAL

## 2019-10-07 ENCOUNTER — ANTI-COAG VISIT (OUTPATIENT)
Dept: CARDIOLOGY | Facility: CLINIC | Age: 49
End: 2019-10-07
Payer: COMMERCIAL

## 2019-10-07 ENCOUNTER — TELEPHONE (OUTPATIENT)
Dept: TRANSPLANT | Facility: HOSPITAL | Age: 49
End: 2019-10-07

## 2019-10-07 VITALS — WEIGHT: 232 LBS | SYSTOLIC BLOOD PRESSURE: 84 MMHG | HEIGHT: 67 IN | BODY MASS INDEX: 36.41 KG/M2 | TEMPERATURE: 99 F

## 2019-10-07 DIAGNOSIS — Z95.811 HEART REPLACED BY HEART ASSIST DEVICE: ICD-10-CM

## 2019-10-07 DIAGNOSIS — Z79.01 LONG TERM (CURRENT) USE OF ANTICOAGULANTS: ICD-10-CM

## 2019-10-07 DIAGNOSIS — Z95.811 LVAD (LEFT VENTRICULAR ASSIST DEVICE) PRESENT: ICD-10-CM

## 2019-10-07 PROCEDURE — 99999 PR PBB SHADOW E&M-EST. PATIENT-LVL III: CPT | Mod: PBBFAC,TXP,, | Performed by: INTERNAL MEDICINE

## 2019-10-07 PROCEDURE — 93750 PR INTERROGATE VENT ASSIST DEV, IN PERSON, W PHYSICIAN ANALYSIS: ICD-10-PCS | Mod: NTX,S$GLB,, | Performed by: INTERNAL MEDICINE

## 2019-10-07 PROCEDURE — 99999 PR PBB SHADOW E&M-EST. PATIENT-LVL III: ICD-10-PCS | Mod: PBBFAC,TXP,, | Performed by: INTERNAL MEDICINE

## 2019-10-07 PROCEDURE — 3008F PR BODY MASS INDEX (BMI) DOCUMENTED: ICD-10-PCS | Mod: CPTII,NTX,S$GLB, | Performed by: INTERNAL MEDICINE

## 2019-10-07 PROCEDURE — 3008F BODY MASS INDEX DOCD: CPT | Mod: CPTII,NTX,S$GLB, | Performed by: INTERNAL MEDICINE

## 2019-10-07 PROCEDURE — 93750 INTERROGATION VAD IN PERSON: CPT | Mod: NTX,S$GLB,, | Performed by: INTERNAL MEDICINE

## 2019-10-07 PROCEDURE — 71046 XR CHEST PA AND LATERAL: ICD-10-PCS | Mod: 26,NTX,, | Performed by: RADIOLOGY

## 2019-10-07 PROCEDURE — 71046 X-RAY EXAM CHEST 2 VIEWS: CPT | Mod: TC,NTX

## 2019-10-07 PROCEDURE — 71046 X-RAY EXAM CHEST 2 VIEWS: CPT | Mod: 26,NTX,, | Performed by: RADIOLOGY

## 2019-10-07 PROCEDURE — 99999 PR PBB SHADOW E&M-EST. PATIENT-LVL I: CPT | Mod: PBBFAC,TXP,,

## 2019-10-07 PROCEDURE — 99214 OFFICE O/P EST MOD 30 MIN: CPT | Mod: NTX,S$GLB,, | Performed by: INTERNAL MEDICINE

## 2019-10-07 PROCEDURE — 99214 PR OFFICE/OUTPT VISIT, EST, LEVL IV, 30-39 MIN: ICD-10-PCS | Mod: NTX,S$GLB,, | Performed by: INTERNAL MEDICINE

## 2019-10-07 PROCEDURE — 99999 PR PBB SHADOW E&M-EST. PATIENT-LVL I: ICD-10-PCS | Mod: PBBFAC,TXP,,

## 2019-10-07 RX ORDER — OXYCODONE AND ACETAMINOPHEN 5; 325 MG/1; MG/1
1 TABLET ORAL DAILY
Status: ON HOLD | COMMUNITY
End: 2019-12-30 | Stop reason: HOSPADM

## 2019-10-07 RX ORDER — SPIRONOLACTONE 25 MG/1
25 TABLET ORAL DAILY
Qty: 30 TABLET | Refills: 11 | Status: ON HOLD | OUTPATIENT
Start: 2019-10-07 | End: 2019-10-30 | Stop reason: HOSPADM

## 2019-10-07 RX ORDER — FUROSEMIDE 20 MG/1
20 TABLET ORAL 2 TIMES DAILY
Qty: 60 TABLET | Refills: 11 | Status: SHIPPED | OUTPATIENT
Start: 2019-10-07 | End: 2019-10-07

## 2019-10-07 RX ORDER — FUROSEMIDE 20 MG/1
20 TABLET ORAL DAILY
Qty: 30 TABLET | Refills: 11 | Status: ON HOLD | OUTPATIENT
Start: 2019-10-07 | End: 2019-10-22 | Stop reason: SDUPTHER

## 2019-10-07 NOTE — TELEPHONE ENCOUNTER
SW notified by LVAD RN Coordinator Suha Meraz, that pt's HH agency giving pt a hard time with regard to doing  for pt's PT/INR. Per Suha, the pt stating that they do not want to draw labs if cannot do PT/INR with finger stick machine.     SW contacted Kim at [employer] (ph#[phone]) regarding situation. Kim reports that they will plan on doing 's for PT/INR from now on and plan to do a lab on Wed 10/9. SW provided Kim with Coumadin clinic contact info and LVAD Clinic contact info. Kim confirmed that they attempted to come by pt's home today but she was not there. BENIGNO explained that pt here at [facility] for appointments. Kim expressed understanding and states that pt will continue to get SN for labs/vitals and PT. SW notified LVAD coordinator and remains available.

## 2019-10-07 NOTE — PROCEDURES
TXP RON INTERROGATIONS 10/7/2019 10/1/2019 10/1/2019 10/1/2019 9/30/2019 9/30/2019 9/30/2019   Type HeartMate3 - - - - - -   Flow 4 - - - - - -   Speed 5200 - - - - - -   PI 5.5 - - - - - -   Power (Orellana) 3.7 - - - - - -   LSL 4800 - - - - - -   Pulsatility Pulse Intermittent pulse Intermittent pulse Intermittent pulse Intermittent pulse Intermittent pulse Intermittent pulse   }

## 2019-10-07 NOTE — PROGRESS NOTES
Date of Implant with Heartmate 3 LVAD: 9/10/19    PATIENT ARRIVED IN CLINIC:  Ambulatory   Accompanied by:     Vitals  Temperature, oral:   Temp Readings from Last 1 Encounters:   10/01/19 98 °F (36.7 °C) (Oral)     Blood Pressure:   BP Readings from Last 3 Encounters:   10/07/19 (!) 84/0   19 111/72   02/23/15 122/85        VAD Interrogation:  TXP RON INTERROGATIONS 10/7/2019 10/1/2019 10/1/2019   Type HeartMate3 - -   Flow 4 - -   Speed 5200 - -   PI 5.5 - -   Power (Orellana) 3.7 - -   LSL 4800 - -   Pulsatility Pulse Intermittent pulse Intermittent pulse       Flow in history: 4-5  History [id number]  Problems / Issues / Alarms with VAD if any: None noted  VAD Sounds: HM3 sound Smooth  Heartmate 3 Module Cable:  No yellow exposed and Attempted to unscrew modular cable to ensure it will be able to come lose in the event we ever need to change the modular cable while patient held the driveline in place so it would not move. Modular cable connection able to be unscrewed and re-tightened. Instructed pt to perform this weekly.    HCT:   Lab Results   Component Value Date    HCT 33.5 (L) 10/07/2019    HCT 33 (L) 2019       Complaints/reason for visit today: routine and recent discharge  Emergency Equipment With Patient: yes   VAD Binder With Patient: yes   Reviewed VAD Numbers In Binder: yes    Any Equipment Issues: None noted (Refer to  note for complete details)    DLES Assessment:  Appearance Of Driveline: 1-2 with sutures, scant yellow drainage note.       Antibiotics: NO  Velour: no  Manual & Visual Inspection Of Driveline: No kinks or tears noted  Stabilization Device In Use: yes, parrish securement device      Patient MyChart Questionnaire: No flowsheet data found.     Assessment:   PAIN: YES Pain Rating: 3, Location of Pain: left back and shoulder, Description of Pain:  Aches/sharp, Pain Medication/How Often: percocet 1-2 daily, Does this relieve the Pain?  yes  Complaints Of Nausea / Vomiting: None noted    Appearance and Frequency Of Stools: normal and formed without blood & daily  Color Of Urine: clear/yellow  Coping/Depression/Anxiety: coping okay, anxious and depressed  Sleep Habits: 8 hrs /night  Sleep Aids: remeron  Showering: No  Activity/Exercise: pt reports beginning PT /OT with h/h this week   Driving: No.    DLES Dressing Care:   Frequency of Dressing Changes: daily & soap and water dressing - caregiver educated on outpatient supply differences  Pt In Need Of Management Kits?:yes -   2 Box of soap and water dressing    Labs:    Chemistry        Component Value Date/Time     10/07/2019 1420    K 4.0 10/07/2019 1420     10/07/2019 1420    CO2 21 (L) 10/07/2019 1420    BUN 16 10/07/2019 1420    CREATININE 1.4 10/07/2019 1420     (H) 10/07/2019 1420        Component Value Date/Time    CALCIUM 9.5 10/07/2019 1420    ALKPHOS 90 10/07/2019 1420    AST 24 10/07/2019 1420    ALT 27 10/07/2019 1420    BILITOT 0.5 10/07/2019 1420    ESTGFRAFRICA 50.9 (A) 10/07/2019 1420    EGFRNONAA 44.1 (A) 10/07/2019 1420            Magnesium   Date Value Ref Range Status   10/07/2019 2.1 1.6 - 2.6 mg/dL Final       Lab Results   Component Value Date    WBC 8.71 10/07/2019    HGB 10.1 (L) 10/07/2019    HCT 33.5 (L) 10/07/2019    MCV 88 10/07/2019     (H) 10/07/2019       Lab Results   Component Value Date    INR 4.6 (H) 10/07/2019    INR 3.8 10/04/2019    INR 2.0 (H) 10/01/2019       BNP   Date Value Ref Range Status   10/07/2019 1,194 (H) 0 - 99 pg/mL Final     Comment:     Values of less than 100 pg/ml are consistent with non-CHF populations.   09/30/2019 730 (H) 0 - 99 pg/mL Final     Comment:     Values of less than 100 pg/ml are consistent with non-CHF populations.   09/27/2019 515 (H) 0 - 99 pg/mL Final     Comment:     Values of less than 100 pg/ml are consistent with non-CHF populations.       LD   Date Value Ref Range Status   10/07/2019 301 (H)  110 - 260 U/L Final     Comment:     Results are increased in hemolyzed samples.   10/01/2019 307 (H) 110 - 260 U/L Final     Comment:     Results are increased in hemolyzed samples.   09/30/2019 303 (H) 110 - 260 U/L Final     Comment:     Results are increased in hemolyzed samples.  *Result may be interfered by visible hemolysis         Labs reviewed with patient: YES      Patient Satisfaction Survey completed per patient: No  (explained about signature and box to check)  Medication reconciliation: per MA.  Medication Detail updated today: yes  Coumadin Managed by: [facility],     Education: Reviewed driveline care, emergency procedures, how to change the controller, alarms with patient, as well as discussed how to page the VAD coordinator in case of an emergency. It is medically necessary to have VAD management kits in order to prevent infection or to assist in the healing of an infected DLES.     Plans/Needs:  Pt doing pretty well since d/c but reports increasing sOB and fatigue although has reported weight loss.   Pt to start 20 mg lasix daily and increase aldactone to 25 mg to assist with edema.  Pt also to do chest xray today.  Pt to RTC in 1 week, please refer to MD note.     Hurricane Season: No

## 2019-10-07 NOTE — LETTER
October 7, 2019        Joaquin Del Toro  [address]  [address]  [place] [address]  Phone: [phone]  Fax: [phone]             [facility] [address]  [facility] [address]  Phone: [phone]   Patient: Deborah Naavs   MR Number: [id number]   YOB: 1970   Date of Visit: 10/7/2019       Dear Dr. Joaquin Del Toro    Thank you for referring Deborah Navas to me for evaluation. Attached you will find relevant portions of my assessment and plan of care.    If you have questions, please do not hesitate to call me. I look forward to following Deborah Navas along with you.    Sincerely,    Jolene Lua MD    Enclosure    If you would like to receive this communication electronically, please contact [email or web address] or [phone] to request [employer] access.    [employer] is a tool which provides read-only access to select patient information with whom you have a relationship. Its easy to use and provides real time access to review your patients record including encounter summaries, notes, results, and demographic information.    If you feel you have received this communication in error or would no longer like to receive these types of communications, please e-mail [email or web address]

## 2019-10-07 NOTE — PROGRESS NOTES
INR not at goal. Medications, chart, and patient findings reviewed. See calendar for adjustments to dose and follow up plan.  Hold x 2, repeat INR stat 10/9/19, lower dose.

## 2019-10-07 NOTE — PATIENT INSTRUCTIONS
resume lasix 20 qd  increase aldactone tot 25 daliy   CXR today and next visit   BNP / BMP later this week with INR

## 2019-10-10 ENCOUNTER — TELEPHONE (OUTPATIENT)
Dept: TRANSPLANT | Facility: CLINIC | Age: 49
End: 2019-10-10

## 2019-10-10 LAB
CHLORIDE: 24
CREAT SERPL-MCNC: 1.1 MG/DL
INR PPP: 2.4
POTASSIUM: 4.1
SODIUM: 142

## 2019-10-10 NOTE — TELEPHONE ENCOUNTER
----- Message from Mary Cage RN sent at 10/9/2019 11:10 AM CDT -----  Regarding: recheck labs after start lasix start/aldactone increase      ----- Message -----  From: Suha Meraz RN  Sent: 10/7/2019   4:36 PM CDT  To: [facility]  Subject: recheck labs after start lasix start/aldacto#

## 2019-10-11 ENCOUNTER — ANTI-COAG VISIT (OUTPATIENT)
Dept: CARDIOLOGY | Facility: CLINIC | Age: 49
End: 2019-10-11
Payer: COMMERCIAL

## 2019-10-11 DIAGNOSIS — Z79.01 LONG TERM (CURRENT) USE OF ANTICOAGULANTS: ICD-10-CM

## 2019-10-11 DIAGNOSIS — Z95.811 LVAD (LEFT VENTRICULAR ASSIST DEVICE) PRESENT: ICD-10-CM

## 2019-10-11 PROCEDURE — 93793 ANTICOAG MGMT PT WARFARIN: CPT | Mod: S$GLB,,,

## 2019-10-11 PROCEDURE — 93793 PR ANTICOAGULANT MGMT FOR PT TAKING WARFARIN: ICD-10-PCS | Mod: S$GLB,,,

## 2019-10-14 ENCOUNTER — TELEPHONE (OUTPATIENT)
Dept: TRANSPLANT | Facility: CLINIC | Age: 49
End: 2019-10-14

## 2019-10-14 ENCOUNTER — CLINICAL SUPPORT (OUTPATIENT)
Dept: TRANSPLANT | Facility: CLINIC | Age: 49
End: 2019-10-14
Payer: COMMERCIAL

## 2019-10-14 ENCOUNTER — HOSPITAL ENCOUNTER (OUTPATIENT)
Dept: RADIOLOGY | Facility: HOSPITAL | Age: 49
Discharge: HOME | End: 2019-10-14
Attending: INTERNAL MEDICINE
Payer: COMMERCIAL

## 2019-10-14 ENCOUNTER — ANTI-COAG VISIT (OUTPATIENT)
Dept: CARDIOLOGY | Facility: CLINIC | Age: 49
End: 2019-10-14
Payer: COMMERCIAL

## 2019-10-14 ENCOUNTER — DOCUMENTATION ONLY (OUTPATIENT)
Dept: TRANSPLANT | Facility: CLINIC | Age: 49
End: 2019-10-14

## 2019-10-14 ENCOUNTER — OFFICE VISIT (OUTPATIENT)
Dept: TRANSPLANT | Facility: CLINIC | Age: 49
End: 2019-10-14
Payer: COMMERCIAL

## 2019-10-14 VITALS
SYSTOLIC BLOOD PRESSURE: 88 MMHG | TEMPERATURE: 98 F | BODY MASS INDEX: 35.2 KG/M2 | HEART RATE: 124 BPM | OXYGEN SATURATION: 96 % | WEIGHT: 224.25 LBS | HEIGHT: 67 IN

## 2019-10-14 DIAGNOSIS — Z86.79 HISTORY OF VENTRICULAR TACHYCARDIA: ICD-10-CM

## 2019-10-14 DIAGNOSIS — Z95.810 ICD (IMPLANTABLE CARDIOVERTER-DEFIBRILLATOR) IN PLACE: ICD-10-CM

## 2019-10-14 DIAGNOSIS — Z86.79 HISTORY OF VENTRICULAR FIBRILLATION: ICD-10-CM

## 2019-10-14 DIAGNOSIS — I42.0 CONGESTIVE CARDIOMYOPATHY: ICD-10-CM

## 2019-10-14 DIAGNOSIS — Z95.811 LVAD (LEFT VENTRICULAR ASSIST DEVICE) PRESENT: Primary | ICD-10-CM

## 2019-10-14 DIAGNOSIS — Z95.811 LVAD (LEFT VENTRICULAR ASSIST DEVICE) PRESENT: ICD-10-CM

## 2019-10-14 DIAGNOSIS — Z95.811 HEART REPLACED BY HEART ASSIST DEVICE: ICD-10-CM

## 2019-10-14 DIAGNOSIS — Z79.01 ANTICOAGULATION MONITORING, INR RANGE 2-3: ICD-10-CM

## 2019-10-14 DIAGNOSIS — J90 PLEURAL EFFUSION: ICD-10-CM

## 2019-10-14 DIAGNOSIS — N18.9 CHRONIC KIDNEY DISEASE, UNSPECIFIED CKD STAGE: ICD-10-CM

## 2019-10-14 DIAGNOSIS — Z79.01 LONG TERM (CURRENT) USE OF ANTICOAGULANTS: ICD-10-CM

## 2019-10-14 DIAGNOSIS — I50.22 CHRONIC SYSTOLIC CONGESTIVE HEART FAILURE: ICD-10-CM

## 2019-10-14 DIAGNOSIS — Z76.82 HEART TRANSPLANT CANDIDATE: ICD-10-CM

## 2019-10-14 PROBLEM — I50.43 ACUTE ON CHRONIC COMBINED SYSTOLIC AND DIASTOLIC HEART FAILURE: Status: RESOLVED | Noted: 2019-09-04 | Resolved: 2019-10-14

## 2019-10-14 PROBLEM — Z51.5 PALLIATIVE CARE ENCOUNTER: Status: RESOLVED | Noted: 2019-09-10 | Resolved: 2019-10-14

## 2019-10-14 LAB — BNP: 914

## 2019-10-14 PROCEDURE — 3008F BODY MASS INDEX DOCD: CPT | Mod: CPTII,NTX,S$GLB, | Performed by: INTERNAL MEDICINE

## 2019-10-14 PROCEDURE — 99999 PR PBB SHADOW E&M-EST. PATIENT-LVL I: CPT | Mod: PBBFAC,TXP,,

## 2019-10-14 PROCEDURE — 99999 PR PBB SHADOW E&M-EST. PATIENT-LVL I: ICD-10-PCS | Mod: PBBFAC,TXP,,

## 2019-10-14 PROCEDURE — 93750 OP LVAD INTERROGATION: ICD-10-PCS | Mod: S$GLB,TXP,, | Performed by: INTERNAL MEDICINE

## 2019-10-14 PROCEDURE — 99999 PR PBB SHADOW E&M-EST. PATIENT-LVL IV: CPT | Mod: PBBFAC,TXP,, | Performed by: INTERNAL MEDICINE

## 2019-10-14 PROCEDURE — 71046 X-RAY EXAM CHEST 2 VIEWS: CPT | Mod: TC,FY,NTX

## 2019-10-14 PROCEDURE — 93793 ANTICOAG MGMT PT WARFARIN: CPT | Mod: S$GLB,,,

## 2019-10-14 PROCEDURE — 3008F PR BODY MASS INDEX (BMI) DOCUMENTED: ICD-10-PCS | Mod: CPTII,NTX,S$GLB, | Performed by: INTERNAL MEDICINE

## 2019-10-14 PROCEDURE — 99214 OFFICE O/P EST MOD 30 MIN: CPT | Mod: NTX,S$GLB,, | Performed by: INTERNAL MEDICINE

## 2019-10-14 PROCEDURE — 71046 X-RAY EXAM CHEST 2 VIEWS: CPT | Mod: 26,NTX,, | Performed by: RADIOLOGY

## 2019-10-14 PROCEDURE — 99999 PR PBB SHADOW E&M-EST. PATIENT-LVL IV: ICD-10-PCS | Mod: PBBFAC,TXP,, | Performed by: INTERNAL MEDICINE

## 2019-10-14 PROCEDURE — 93750 INTERROGATION VAD IN PERSON: CPT | Mod: S$GLB,TXP,, | Performed by: INTERNAL MEDICINE

## 2019-10-14 PROCEDURE — 93793 PR ANTICOAGULANT MGMT FOR PT TAKING WARFARIN: ICD-10-PCS | Mod: S$GLB,,,

## 2019-10-14 PROCEDURE — 99214 PR OFFICE/OUTPT VISIT, EST, LEVL IV, 30-39 MIN: ICD-10-PCS | Mod: NTX,S$GLB,, | Performed by: INTERNAL MEDICINE

## 2019-10-14 PROCEDURE — 71046 XR CHEST PA AND LATERAL: ICD-10-PCS | Mod: 26,NTX,, | Performed by: RADIOLOGY

## 2019-10-14 RX ORDER — LISINOPRIL 5 MG/1
5 TABLET ORAL 2 TIMES DAILY
Qty: 180 TABLET | Refills: 3 | Status: ON HOLD | OUTPATIENT
Start: 2019-10-14 | End: 2019-10-30 | Stop reason: HOSPADM

## 2019-10-14 NOTE — LETTER
October 14, 2019        Joaquin Del Toro  [address]  [address]  [place] [facility] [address]  Phone: [phone]  Fax: [phone]             [facility] [address]  [facility] [address]  Phone: [phone]   Patient: Deborah Navas   MR Number: [id number]   YOB: 1970   Date of Visit: 10/14/2019       Dear Dr. Joaquin Del Toro    Thank you for referring Deborah Navas to me for evaluation. Attached you will find relevant portions of my assessment and plan of care.    If you have questions, please do not hesitate to call me. I look forward to following Deborah Navas along with you.    Sincerely,    Vi Bryant MD    Enclosure    If you would like to receive this communication electronically, please contact [email or web address] or [phone] to request [email or web address] Link access.    [email or web address] Link is a tool which provides read-only access to select patient information with whom you have a relationship. Its easy to use and provides real time access to review your patients record including encounter summaries, notes, results, and demographic information.    If you feel you have received this communication in error or would no longer like to receive these types of communications, please e-mail [email or web address]

## 2019-10-14 NOTE — PROGRESS NOTES
Date of Implant with Heartmate 3 LVAD: 9/10/19    PATIENT ARRIVED IN CLINIC:  Ambulatory   Accompanied by: mother    Vitals  Temperature, oral:   Temp Readings from Last 1 Encounters:   10/14/19 98 °F (36.7 °C) (Oral)     Blood Pressure:   BP Readings from Last 3 Encounters:   10/14/19 (!) 88/0   10/07/19 (!) 84/0   10/01/19 (!) 76/0        VAD Interrogation:  TXP RON INTERROGATIONS 10/14/2019 10/7/2019 10/1/2019   Type HeartMate3 HeartMate3 -   Flow 3.7 4 -   Speed 5200 5200 -   PI 7.3 5.5 -   Power (Orellana) 3.8 3.7 -   LSL 4800 4800 -   Pulsatility Intermittent pulse Pulse Intermittent pulse       Flow in history: 3-4s  History [id number]  Problems / Issues / Alarms with VAD if any: None noted  VAD Sounds: HM3 sound Smooth  Heartmate 3 Module Cable:  No yellow exposed and Attempted to unscrew modular cable to ensure it will be able to come lose in the event we ever need to change the modular cable while patient held the driveline in place so it would not move. Modular cable connection able to be unscrewed and re-tightened. Instructed pt to perform this weekly.    HCT:   Lab Results   Component Value Date    HCT 36.7 (L) 10/14/2019    HCT 33 (L) 2019       Complaints/reason for visit today: routine  Emergency Equipment With Patient: yes   VAD Binder With Patient: yes   Reviewed VAD Numbers In Binder: yes    Any Equipment Issues: None noted (Refer to  note for complete details)    DLES Assessment:  Appearance Of Driveline: 2 with suture intact, redness and scant amount of drainage, not incorporated.         Antibiotics: NO  Velour: no  Manual & Visual Inspection Of Driveline: No kinks or tears noted  Stabilization Device In Use: yes, parrish securement device      Patient MyChart Questionnaire: No flowsheet data found.     Assessment:   PAIN: YES Location of Pain: midsternal incision, Description of Pain:  tenderness, Pain Medication/How Often: percocet as needed but has not taken in 3  days. Also tylenol OTC, Does this relieve the Pain? yes  Complaints Of Nausea / Vomiting: None noted    Appearance and Frequency Of Stools: normal and formed without blood & daily  Color Of Urine: clear/yellow  Coping/Depression/Anxiety: coping okay  Sleep Habits: 6-7 hrs /night  Sleep Aids: None noted  Showering: No  Activity/Exercise: PT 2x weekly. Leg exercises on days that PT does not come to her house.    Driving: No.    DLES Dressing Care:   Frequency of Dressing Changes: daily & soap and water dressing  Pt In Need Of Management Kits?:no     Labs:    Chemistry        Component Value Date/Time     10/14/2019 1335     10/10/2019    K 3.7 10/14/2019 1335    K 4.1 10/10/2019     10/14/2019 1335    CL 24 10/10/2019    CO2 24 10/14/2019 1335    BUN 15 10/14/2019 1335    CREATININE 1.4 10/14/2019 1335    CREATININE 1.1 10/10/2019     (H) 10/14/2019 1335        Component Value Date/Time    CALCIUM 9.5 10/14/2019 1335    ALKPHOS 86 10/14/2019 1335    AST 17 10/14/2019 1335    ALT 14 10/14/2019 1335    BILITOT 0.8 10/14/2019 1335    ESTGFRAFRICA 50.5 (A) 10/14/2019 1335    EGFRNONAA 43.8 (A) 10/14/2019 1335            Magnesium   Date Value Ref Range Status   10/14/2019 1.9 1.6 - 2.6 mg/dL Final       Lab Results   Component Value Date    WBC 6.08 10/14/2019    HGB 10.4 (L) 10/14/2019    HCT 36.7 (L) 10/14/2019    MCV 91 10/14/2019     (H) 10/14/2019       Lab Results   Component Value Date    INR 1.9 (H) 10/14/2019    INR 2.4 10/10/2019    INR 4.6 (H) 10/07/2019       BNP   Date Value Ref Range Status   10/14/2019 923 (H) 0 - 99 pg/mL Final     Comment:     Values of less than 100 pg/ml are consistent with non-CHF populations.   10/07/2019 1,194 (H) 0 - 99 pg/mL Final     Comment:     Values of less than 100 pg/ml are consistent with non-CHF populations.   09/30/2019 730 (H) 0 - 99 pg/mL Final     Comment:     Values of less than 100 pg/ml are consistent with non-CHF populations.        LD   Date Value Ref Range Status   10/14/2019 294 (H) 110 - 260 U/L Final     Comment:     Results are increased in hemolyzed samples.   10/07/2019 301 (H) 110 - 260 U/L Final     Comment:     Results are increased in hemolyzed samples.   10/01/2019 307 (H) 110 - 260 U/L Final     Comment:     Results are increased in hemolyzed samples.       Labs reviewed with patient: YES; - slowly trending down     Patient Satisfaction Survey completed per patient: Yes- Inpatient (Initial Implant Discharge)  (explained about signature and box to check)  Medication reconciliation: per MA.  Medication Detail updated today: yes  Coumadin Managed by: [facility]    Education: Reviewed driveline care, emergency procedures, how to change the controller, alarms with patient, as well as discussed how to page the VAD coordinator in case of an emergency. It is medically necessary to have VAD management kits in order to prevent infection or to assist in the healing of an infected DLES.     Plans/Needs: VAD appt #2. Removed mediastinal sutures; pt tolerated well. No midsternal sutures or steri stripes in place. BP slightly elevated, added Lisinopril 5mg BID. Pt winded when walking into exam room. Pt states that she is still trying to build her tolerance with walking; PT is helping. Hopeful that better BP control will help with BNP. Pt to RTC in 1 week; will then discuss moving to 2 weeks.     Hurricane Season: No

## 2019-10-14 NOTE — PATIENT INSTRUCTIONS
start lisinopril 5mg twice a day    Keep salt intake to under 2000 mg sodium, fluids to under 2 L (64 oz)    Call us if you find yourself getting more short of breath, have more swelling or unexpected weight changes, fever, chills, alarms, bloody or black bowel movements, or drainage from your driveline    Flu shot

## 2019-10-14 NOTE — TELEPHONE ENCOUNTER
Called pt to see of she was going to make her appt today. No call so left a VM instructing her to give us a call back.

## 2019-10-14 NOTE — PROGRESS NOTES
"Subjective:   Patient ID:  Deborah Navas is a 49 y.o. female who presents for LVAD followup visit.    Implant Date:9/10/19  Heartmate 3 RPM 5200  INR goal: 2-3   Bridge with Heparin   Antiplatelets:    Inotropes: NA        DLES:"2" with sutures  Dressing: Daily with soap and water    TXP [facility]ON INTERROGATIONS 10/14/2019   Type HeartMate3   Flow 3.7   Speed 5200   PI 7.3   Power (Orellana) 3.8   LSL 4800   Pulsatility Intermittent pulse       HPI  48 yo WF with NICM: EF 20%, LVEDD: 6.8cm, V-fib reported in setting of hypokalemia with appropriate shock from ICD (on Amiodarone), HLP, being admitted from procedure room for management of cardiogenic shock and workup for advanced options. started on dobutamine infusion and evaluation for advanced options was initiated. She was accepted for VAD and underwent LVAD implantation on 9/10/2019. She had an uncomplicated postoperative course and downgraded to floor within 3 days. She continued to improve .  Minor issues with diaphragmatic stimulation of LV lead.   Left pleural effusion was noted with chest tube was inserted. Chest tube removed on 9/30 with stable small pleural effusion on 10/1.     Today she comes in for her 2nd LVAD clinic visit post discharge.      Since last visit, pt has been working with PT twice a week at home and does exercises on her own. Doing well  In tmers of pain- has weaned herself off of percocet for the last few days- says her breathing has been terrible- walking in from the scale to exam room was noticeably SOB. Walked 240 ft with PT and had to stop 6 times to catch her breath- doesn't feel LH but worries her legs will give out when it happens. No swelling, sleeping on 1 pillow, no PND.  BM without blood, urine clear, no alarms    DLES is grade 2 w suture (not incorporated, see photo)  Mediastinal suture removed today    Interrogation of device data reveals no alarms, normal flows and power (see VAD interrogation report for full " "details.)    CXR today  Sternotomy wire sutures are again demonstrated and there is stable appearance and positioning of left-sided dual lead cardiac pacemaker and LVAD.  The trachea is unchanged in position and there is stable appearance of the cardiomediastinal shadow and both hilar regions.    There is also stable appearance of both lungs including the hemispheric area of attenuation projected within the left lower thorax on the PA view with both lungs otherwise appearing fully expanded and clear without evidence of pleural effusion.  There is stable appearance of the included osseous structures.      Review of Systems   Constitution: Positive for decreased appetite. Negative for chills, fever, malaise/fatigue and weight gain. Weight loss: lost another 10# since last visit.   HENT: Negative.    Eyes: Negative.    Cardiovascular: Positive for dyspnea on exertion (with minimal exertion). Negative for chest pain, leg swelling, near-syncope, orthopnea, palpitations, paroxysmal nocturnal dyspnea and syncope.   Respiratory: Positive for shortness of breath. Negative for cough.    Endocrine: Negative.    Skin: Negative.    Musculoskeletal: Negative.  Negative for myalgias.   Gastrointestinal: Negative for bloating, abdominal pain, change in bowel habit and jaundice.   Neurological: Negative for dizziness and light-headedness.   Psychiatric/Behavioral: Negative for depression.       Objective:   Blood pressure (!) 88/0, pulse (!) 124, temperature 98 °F (36.7 °C), temperature source Oral, height 5' 7" (1.702 m), weight 101.7 kg (224 lb 3.5 oz), SpO2 96 %.body mass index is 35.12 kg/m².    Doppler:  88 (intermittent pulse) MAP 99    Physical Exam   Constitutional: She is oriented to person, place, and time. She appears well-developed and well-nourished. No distress.   HENT:   Head: Normocephalic and atraumatic. Head is without abrasion and without contusion.   Right Ear: External ear normal.   Left Ear: External ear " normal.   Nose: Nose normal. No epistaxis.   Mouth/Throat: Oropharynx is clear and moist. Mucous membranes are not cyanotic.   Eyes: Pupils are equal, round, and reactive to light. Conjunctivae and EOM are normal. Right eye exhibits no discharge. Left eye exhibits no discharge.   Neck: Normal range of motion. Neck supple. Hepatojugular reflux (very mild ) and JVD (JVP 6 ) present. No tracheal deviation present. No thyromegaly present.   Cardiovascular: Normal rate, regular rhythm, normal heart sounds and normal pulses. Exam reveals no gallop and no friction rub.   No murmur heard.  Normal VAD hum     Pulmonary/Chest: Effort normal. No stridor. No respiratory distress. She has decreased breath sounds in the left lower field. She has no wheezes. She has no rales.   Abdominal: Soft. Normal appearance, normal aorta and bowel sounds are normal. She exhibits no distension. There is no tenderness.   Musculoskeletal: Normal range of motion. She exhibits no edema or tenderness.   Neurological: She is alert and oriented to person, place, and time. She has normal strength and normal reflexes. No cranial nerve deficit. She exhibits normal muscle tone. Coordination normal.   Skin: Skin is warm and dry. No rash noted. No erythema.   Psychiatric: She has a normal mood and affect. Her speech is normal and behavior is normal. Judgment and thought content normal. Cognition and memory are normal.           Lab Results   Component Value Date    WBC 6.08 10/14/2019    HGB 10.4 (L) 10/14/2019    HCT 36.7 (L) 10/14/2019    MCV 91 10/14/2019     (H) 10/14/2019    CO2 24 10/14/2019    CREATININE 1.4 10/14/2019    CALCIUM 9.5 10/14/2019    ALBUMIN 3.2 (L) 10/14/2019    AST 17 10/14/2019     (H) 10/14/2019    ALT 14 10/14/2019     (H) 10/14/2019       Lab Results   Component Value Date    INR 1.9 (H) 10/14/2019    INR 2.4 10/10/2019    INR 4.6 (H) 10/07/2019       BNP   Date Value Ref Range Status   10/14/2019 923 (H) 0 -  99 pg/mL Final     Comment:     Values of less than 100 pg/ml are consistent with non-CHF populations.   10/07/2019 1,194 (H) 0 - 99 pg/mL Final     Comment:     Values of less than 100 pg/ml are consistent with non-CHF populations.   09/30/2019 730 (H) 0 - 99 pg/mL Final     Comment:     Values of less than 100 pg/ml are consistent with non-CHF populations.       LD   Date Value Ref Range Status   10/14/2019 294 (H) 110 - 260 U/L Final     Comment:     Results are increased in hemolyzed samples.   10/07/2019 301 (H) 110 - 260 U/L Final     Comment:     Results are increased in hemolyzed samples.   10/01/2019 307 (H) 110 - 260 U/L Final     Comment:     Results are increased in hemolyzed samples.       Assessment:      1. LVAD (left ventricular assist device) present    2. Chronic systolic congestive heart failure    3. Anticoagulation monitoring, INR range 2-3    4. Chronic kidney disease, unspecified CKD stage    5. Congestive cardiomyopathy    6. Heart transplant candidate    7. History of ventricular fibrillation    8. History of ventricular tachycardia    9. ICD (implantable cardioverter-defibrillator) in place    10. Pleural effusion    11. Heart replaced by heart assist device        Plan:   Add lisinopril 5mg BID for uncontrolled HTN- will get labs on f/u next week  INR slightly subtherapeutic- coumadin clinic to boost and recheck  Cont home PT  Cont to work on wt loss    CXR today without re-accumulation of pleural effusion    Patient is now NYHA II  Recommend 2 gram sodium restriction and 1500cc fluid restriction.  Encourage physical activity with graded exercise program.  Requested patient to weigh themselves daily, and to notify us if their weight increases by more than 3 lbs in 1 day or 5 lbs in 1 week.     Listed for transplant: No    UNOS Patient Status  Functional Status: 80% - Normal activity with effort: some symptoms of disease  Physical Capacity: No Limitations  Working for Income: yes  If yes,  working activity level: Working Full Time prior to LVAD      F/u 1 wk with labs, visit, interogation

## 2019-10-14 NOTE — TELEPHONE ENCOUNTER
Attempted to call pt mother to check on her well being since I was unable to reach her. No answer. Left VM.     After further chart review, I can see Pt finally showed up for labs. And will be heading to clinic after

## 2019-10-14 NOTE — PROCEDURES
TXP [facility] INTERROGATIONS 10/14/2019 10/7/2019 10/1/2019 10/1/2019 10/1/2019 9/30/2019 9/30/2019   Type HeartMate3 HeartMate3 - - - - -   Flow 3.7 4 - - - - -   Speed 5200 5200 - - - - -   PI 7.3 5.5 - - - - -   Power (Orellana) 3.8 3.7 - - - - -   LSL 4800 4800 - - - - -   Pulsatility Intermittent pulse Pulse Intermittent pulse Intermittent pulse Intermittent pulse Intermittent pulse Intermittent pulse   }Interrogation of Ventricular assist device was performed with physician analysis of device parameters and review of device function. I have personally reviewed the interrogation findings and agree with findings as stated.

## 2019-10-15 NOTE — PROGRESS NOTES
INR not at goal. Medications, chart, and patient findings reviewed. See calendar for adjustments to dose and follow up plan.  Will boost gently and resume dose as 2 doses were held last week for elevated INR.  She is still not eating much, SOB per notes. Repeat INR Thursday.

## 2019-10-17 ENCOUNTER — ANTI-COAG VISIT (OUTPATIENT)
Dept: CARDIOLOGY | Facility: CLINIC | Age: 49
End: 2019-10-17
Payer: COMMERCIAL

## 2019-10-17 ENCOUNTER — TELEPHONE (OUTPATIENT)
Dept: TRANSPLANT | Facility: CLINIC | Age: 49
End: 2019-10-17

## 2019-10-17 DIAGNOSIS — Z79.01 LONG TERM (CURRENT) USE OF ANTICOAGULANTS: ICD-10-CM

## 2019-10-17 DIAGNOSIS — Z95.811 LVAD (LEFT VENTRICULAR ASSIST DEVICE) PRESENT: ICD-10-CM

## 2019-10-17 LAB — INR PPP: 3.3

## 2019-10-17 PROCEDURE — 93793 ANTICOAG MGMT PT WARFARIN: CPT | Mod: S$GLB,,,

## 2019-10-17 PROCEDURE — 93793 PR ANTICOAGULANT MGMT FOR PT TAKING WARFARIN: ICD-10-PCS | Mod: S$GLB,,,

## 2019-10-17 NOTE — TELEPHONE ENCOUNTER
Patient called with slight pain noted to the DLES. Explained that its moving more then Monday. According to note Monday, DLES was not incorporated. Explained to patient to anchor it appropriately and tape securely to protect it from moving. DL is no longer draining as it was on Monday. Patient given instructions to call if still hurting tomorrow.

## 2019-10-17 NOTE — TELEPHONE ENCOUNTER
Patient paged VAD coordinator on call stating that she had a low voltage alarm while connected to battery. Alarm is not currently active. Patient reports no other alarms. Asked patient to check all connections, as well as cleaning clips, checking clips for cracks, etc. Patient inspected and no issues noted. She will clean equipment. Geovani to f/u with patient tomorrow.

## 2019-10-17 NOTE — PROGRESS NOTES
Aimee with [facility] called to report that she cb a venipuncture INR and patient had reported she was not feeling too good so she went ahead and cb an INR via fingerstick with result of 3.3, Aimee to fax fingerstick result and venipuncture result when ready

## 2019-10-18 ENCOUNTER — TELEPHONE (OUTPATIENT)
Dept: TRANSPLANT | Facility: CLINIC | Age: 49
End: 2019-10-18

## 2019-10-18 NOTE — TELEPHONE ENCOUNTER
Received call from  regarding patient HR of 130. Asked for CBC/CMP/BNP. Will follow up with results.

## 2019-10-20 ENCOUNTER — HOSPITAL ENCOUNTER (INPATIENT)
Facility: HOSPITAL | Age: 49
LOS: 2 days | Discharge: HOME HEALTH | DRG: 309 | End: 2019-10-22
Attending: INTERNAL MEDICINE | Admitting: INTERNAL MEDICINE
Payer: COMMERCIAL

## 2019-10-20 DIAGNOSIS — I47.20 VENTRICULAR TACHYCARDIA: Primary | ICD-10-CM

## 2019-10-20 DIAGNOSIS — Z95.810 ICD (IMPLANTABLE CARDIOVERTER-DEFIBRILLATOR) IN PLACE: ICD-10-CM

## 2019-10-20 DIAGNOSIS — I47.20 VT (VENTRICULAR TACHYCARDIA): ICD-10-CM

## 2019-10-20 DIAGNOSIS — I50.22 CHRONIC SYSTOLIC CONGESTIVE HEART FAILURE: ICD-10-CM

## 2019-10-20 DIAGNOSIS — Z95.811 LVAD (LEFT VENTRICULAR ASSIST DEVICE) PRESENT: ICD-10-CM

## 2019-10-20 PROCEDURE — 20600001 HC STEP DOWN PRIVATE ROOM: Mod: NTX

## 2019-10-20 PROCEDURE — 93750 INTERROGATION VAD IN PERSON: CPT | Mod: NTX,,, | Performed by: INTERNAL MEDICINE

## 2019-10-20 PROCEDURE — 99223 PR INITIAL HOSPITAL CARE,LEVL III: ICD-10-PCS | Mod: NTX,,, | Performed by: INTERNAL MEDICINE

## 2019-10-20 PROCEDURE — 27000248 HC VAD-ADDITIONAL DAY: Mod: NTX

## 2019-10-20 PROCEDURE — 99223 1ST HOSP IP/OBS HIGH 75: CPT | Mod: NTX,,, | Performed by: INTERNAL MEDICINE

## 2019-10-20 PROCEDURE — 93750 PR INTERROGATE VENT ASSIST DEV, IN PERSON, W PHYSICIAN ANALYSIS: ICD-10-PCS | Mod: NTX,,, | Performed by: INTERNAL MEDICINE

## 2019-10-20 RX ORDER — SPIRONOLACTONE 25 MG/1
25 TABLET ORAL DAILY
Status: DISCONTINUED | OUTPATIENT
Start: 2019-10-21 | End: 2019-10-22 | Stop reason: HOSPADM

## 2019-10-20 RX ORDER — GABAPENTIN 400 MG/1
400 CAPSULE ORAL 2 TIMES DAILY
Status: DISCONTINUED | OUTPATIENT
Start: 2019-10-21 | End: 2019-10-21

## 2019-10-20 RX ORDER — PANTOPRAZOLE SODIUM 40 MG/1
40 TABLET, DELAYED RELEASE ORAL DAILY
Status: DISCONTINUED | OUTPATIENT
Start: 2019-10-21 | End: 2019-10-22 | Stop reason: HOSPADM

## 2019-10-20 RX ORDER — AMOXICILLIN 250 MG
2 CAPSULE ORAL 2 TIMES DAILY PRN
Status: DISCONTINUED | OUTPATIENT
Start: 2019-10-21 | End: 2019-10-22 | Stop reason: HOSPADM

## 2019-10-20 RX ORDER — MIRTAZAPINE 15 MG/1
15 TABLET, FILM COATED ORAL NIGHTLY
Status: DISCONTINUED | OUTPATIENT
Start: 2019-10-21 | End: 2019-10-22 | Stop reason: HOSPADM

## 2019-10-20 RX ORDER — ASPIRIN 325 MG
325 TABLET, DELAYED RELEASE (ENTERIC COATED) ORAL DAILY
Status: DISCONTINUED | OUTPATIENT
Start: 2019-10-21 | End: 2019-10-22 | Stop reason: HOSPADM

## 2019-10-20 RX ORDER — FUROSEMIDE 20 MG/1
20 TABLET ORAL DAILY
Status: DISCONTINUED | OUTPATIENT
Start: 2019-10-21 | End: 2019-10-21

## 2019-10-20 RX ORDER — LISINOPRIL 5 MG/1
5 TABLET ORAL 2 TIMES DAILY
Status: DISCONTINUED | OUTPATIENT
Start: 2019-10-21 | End: 2019-10-21

## 2019-10-20 RX ORDER — VENLAFAXINE 37.5 MG/1
150 TABLET ORAL DAILY
Status: DISCONTINUED | OUTPATIENT
Start: 2019-10-21 | End: 2019-10-22

## 2019-10-21 ENCOUNTER — DOCUMENTATION ONLY (OUTPATIENT)
Dept: CARDIOLOGY | Facility: HOSPITAL | Age: 49
End: 2019-10-21

## 2019-10-21 ENCOUNTER — TELEPHONE (OUTPATIENT)
Dept: TRANSPLANT | Facility: CLINIC | Age: 49
End: 2019-10-21

## 2019-10-21 PROBLEM — I10 ESSENTIAL HYPERTENSION: Status: ACTIVE | Noted: 2019-10-21

## 2019-10-21 LAB
ALBUMIN SERPL BCP-MCNC: 3.4 G/DL (ref 3.5–5.2)
ALP SERPL-CCNC: 88 U/L (ref 55–135)
ALT SERPL W/O P-5'-P-CCNC: 17 U/L (ref 10–44)
ANION GAP SERPL CALC-SCNC: 10 MMOL/L (ref 8–16)
ANION GAP SERPL CALC-SCNC: 11 MMOL/L (ref 8–16)
ANISOCYTOSIS BLD QL SMEAR: SLIGHT
APTT BLDCRRT: 37.5 SEC (ref 21–32)
AST SERPL-CCNC: 21 U/L (ref 10–40)
BASOPHILS # BLD AUTO: 0.08 K/UL (ref 0–0.2)
BASOPHILS NFR BLD: 1.6 % (ref 0–1.9)
BILIRUB DIRECT SERPL-MCNC: 0.4 MG/DL (ref 0.1–0.3)
BILIRUB SERPL-MCNC: 0.7 MG/DL (ref 0.1–1)
BNP SERPL-MCNC: 276 PG/ML (ref 0–99)
BNP SERPL-MCNC: 384 PG/ML (ref 0–99)
BSA FOR ECHO PROCEDURE: 2.19 M2
BUN SERPL-MCNC: 18 MG/DL (ref 6–20)
BUN SERPL-MCNC: 19 MG/DL (ref 6–20)
CALCIUM SERPL-MCNC: 9.2 MG/DL (ref 8.7–10.5)
CALCIUM SERPL-MCNC: 9.6 MG/DL (ref 8.7–10.5)
CHLORIDE SERPL-SCNC: 104 MMOL/L (ref 95–110)
CHLORIDE SERPL-SCNC: 106 MMOL/L (ref 95–110)
CO2 SERPL-SCNC: 24 MMOL/L (ref 23–29)
CO2 SERPL-SCNC: 27 MMOL/L (ref 23–29)
CREAT SERPL-MCNC: 1.2 MG/DL (ref 0.5–1.4)
CREAT SERPL-MCNC: 1.3 MG/DL (ref 0.5–1.4)
CRP SERPL-MCNC: 18.8 MG/L (ref 0–8.2)
CV ECHO LV RWT: 0.16 CM
DIFFERENTIAL METHOD: ABNORMAL
DOP CALC LVOT AREA: 3.1 CM2
DOP CALC LVOT DIAMETER: 1.98 CM
ECHO LV POSTERIOR WALL: 0.55 CM (ref 0.6–1.1)
EOSINOPHIL # BLD AUTO: 0.3 K/UL (ref 0–0.5)
EOSINOPHIL NFR BLD: 5.1 % (ref 0–8)
ERYTHROCYTE [DISTWIDTH] IN BLOOD BY AUTOMATED COUNT: 16.8 % (ref 11.5–14.5)
EST. GFR  (AFRICAN AMERICAN): 55.3 ML/MIN/1.73 M^2
EST. GFR  (AFRICAN AMERICAN): >60 ML/MIN/1.73 M^2
EST. GFR  (NON AFRICAN AMERICAN): 48 ML/MIN/1.73 M^2
EST. GFR  (NON AFRICAN AMERICAN): 52.8 ML/MIN/1.73 M^2
FRACTIONAL SHORTENING: 10 % (ref 28–44)
GLUCOSE SERPL-MCNC: 101 MG/DL (ref 70–110)
GLUCOSE SERPL-MCNC: 108 MG/DL (ref 70–110)
HCT VFR BLD AUTO: 37.2 % (ref 37–48.5)
HGB BLD-MCNC: 11 G/DL (ref 12–16)
HYPOCHROMIA BLD QL SMEAR: ABNORMAL
IMM GRANULOCYTES # BLD AUTO: 0.01 K/UL (ref 0–0.04)
IMM GRANULOCYTES NFR BLD AUTO: 0.2 % (ref 0–0.5)
INR PPP: 3.1 (ref 0.8–1.2)
INR PPP: 3.2 (ref 0.8–1.2)
INTERVENTRICULAR SEPTUM: 0.55 CM (ref 0.6–1.1)
LA MAJOR: 5.69 CM
LA MINOR: 4.16 CM
LA WIDTH: 3.19 CM
LDH SERPL L TO P-CCNC: 255 U/L (ref 110–260)
LDH SERPL L TO P-CCNC: 259 U/L (ref 110–260)
LEFT ATRIUM SIZE: 3.09 CM
LEFT ATRIUM VOLUME INDEX: 19 ML/M2
LEFT ATRIUM VOLUME: 40.27 CM3
LEFT INTERNAL DIMENSION IN SYSTOLE: 6.1 CM (ref 2.1–4)
LEFT VENTRICLE DIASTOLIC VOLUME INDEX: 112.42 ML/M2
LEFT VENTRICLE DIASTOLIC VOLUME: 238.09 ML
LEFT VENTRICLE MASS INDEX: 70 G/M2
LEFT VENTRICLE SYSTOLIC VOLUME INDEX: 88.3 ML/M2
LEFT VENTRICLE SYSTOLIC VOLUME: 186.97 ML
LEFT VENTRICULAR INTERNAL DIMENSION IN DIASTOLE: 6.79 CM (ref 3.5–6)
LEFT VENTRICULAR MASS: 148.8 G
LYMPHOCYTES # BLD AUTO: 1.3 K/UL (ref 1–4.8)
LYMPHOCYTES NFR BLD: 25 % (ref 18–48)
MAGNESIUM SERPL-MCNC: 2 MG/DL (ref 1.6–2.6)
MAGNESIUM SERPL-MCNC: 2.1 MG/DL (ref 1.6–2.6)
MCH RBC QN AUTO: 25.8 PG (ref 27–31)
MCHC RBC AUTO-ENTMCNC: 29.6 G/DL (ref 32–36)
MCV RBC AUTO: 87 FL (ref 82–98)
MONOCYTES # BLD AUTO: 0.3 K/UL (ref 0.3–1)
MONOCYTES NFR BLD: 5.5 % (ref 4–15)
NEUTROPHILS # BLD AUTO: 3.2 K/UL (ref 1.8–7.7)
NEUTROPHILS NFR BLD: 62.6 % (ref 38–73)
NRBC BLD-RTO: 0 /100 WBC
OVALOCYTES BLD QL SMEAR: ABNORMAL
PHOSPHATE SERPL-MCNC: 3.7 MG/DL (ref 2.7–4.5)
PISA TR MAX VEL: 2.6 M/S
PLATELET # BLD AUTO: 283 K/UL (ref 150–350)
PLATELET BLD QL SMEAR: ABNORMAL
PMV BLD AUTO: 9.9 FL (ref 9.2–12.9)
POIKILOCYTOSIS BLD QL SMEAR: SLIGHT
POLYCHROMASIA BLD QL SMEAR: ABNORMAL
POTASSIUM SERPL-SCNC: 3.5 MMOL/L (ref 3.5–5.1)
POTASSIUM SERPL-SCNC: 3.6 MMOL/L (ref 3.5–5.1)
POTASSIUM SERPL-SCNC: 3.8 MMOL/L (ref 3.5–5.1)
PREALB SERPL-MCNC: 24 MG/DL (ref 20–43)
PROT SERPL-MCNC: 7.2 G/DL (ref 6–8.4)
PROTHROMBIN TIME: 29.8 SEC (ref 9–12.5)
PROTHROMBIN TIME: 31.3 SEC (ref 9–12.5)
RA MAJOR: 4.75 CM
RA PRESSURE: 3 MMHG
RA WIDTH: 3.55 CM
RBC # BLD AUTO: 4.27 M/UL (ref 4–5.4)
RV TISSUE DOPPLER FREE WALL SYSTOLIC VELOCITY 1 (APICAL 4 CHAMBER VIEW): 8 CM/S
SINUS: 3.01 CM
SODIUM SERPL-SCNC: 140 MMOL/L (ref 136–145)
SODIUM SERPL-SCNC: 142 MMOL/L (ref 136–145)
STJ: 2.64 CM
TDI LATERAL: 0.08 M/S
TDI SEPTAL: 0.07 M/S
TDI: 0.08 M/S
TR MAX PG: 27 MMHG
TRICUSPID ANNULAR PLANE SYSTOLIC EXCURSION: 1.3 CM
TV REST PULMONARY ARTERY PRESSURE: 30 MMHG
WBC # BLD AUTO: 5.09 K/UL (ref 3.9–12.7)

## 2019-10-21 PROCEDURE — 25000003 PHARM REV CODE 250: Mod: NTX | Performed by: INTERNAL MEDICINE

## 2019-10-21 PROCEDURE — 87075 CULTR BACTERIA EXCEPT BLOOD: CPT | Mod: NTX

## 2019-10-21 PROCEDURE — 86140 C-REACTIVE PROTEIN: CPT | Mod: NTX

## 2019-10-21 PROCEDURE — 97802 MEDICAL NUTRITION INDIV IN: CPT | Mod: NTX

## 2019-10-21 PROCEDURE — 36415 COLL VENOUS BLD VENIPUNCTURE: CPT | Mod: NTX

## 2019-10-21 PROCEDURE — 83735 ASSAY OF MAGNESIUM: CPT | Mod: NTX

## 2019-10-21 PROCEDURE — 63600175 PHARM REV CODE 636 W HCPCS: Mod: NTX | Performed by: PHYSICIAN ASSISTANT

## 2019-10-21 PROCEDURE — 83615 LACTATE (LD) (LDH) ENZYME: CPT | Mod: NTX

## 2019-10-21 PROCEDURE — 84100 ASSAY OF PHOSPHORUS: CPT | Mod: NTX

## 2019-10-21 PROCEDURE — 93010 EKG 12-LEAD: ICD-10-PCS | Mod: NTX,,, | Performed by: INTERNAL MEDICINE

## 2019-10-21 PROCEDURE — 99223 PR INITIAL HOSPITAL CARE,LEVL III: ICD-10-PCS | Mod: NTX,,, | Performed by: INTERNAL MEDICINE

## 2019-10-21 PROCEDURE — 83880 ASSAY OF NATRIURETIC PEPTIDE: CPT | Mod: NTX

## 2019-10-21 PROCEDURE — 84132 ASSAY OF SERUM POTASSIUM: CPT | Mod: NTX

## 2019-10-21 PROCEDURE — 25000003 PHARM REV CODE 250: Mod: NTX | Performed by: PHYSICIAN ASSISTANT

## 2019-10-21 PROCEDURE — 87070 CULTURE OTHR SPECIMN AEROBIC: CPT | Mod: NTX

## 2019-10-21 PROCEDURE — 80076 HEPATIC FUNCTION PANEL: CPT | Mod: NTX

## 2019-10-21 PROCEDURE — 93005 ELECTROCARDIOGRAM TRACING: CPT | Mod: NTX

## 2019-10-21 PROCEDURE — 83735 ASSAY OF MAGNESIUM: CPT | Mod: 91,NTX

## 2019-10-21 PROCEDURE — 84134 ASSAY OF PREALBUMIN: CPT | Mod: NTX

## 2019-10-21 PROCEDURE — 93010 ELECTROCARDIOGRAM REPORT: CPT | Mod: NTX,,, | Performed by: INTERNAL MEDICINE

## 2019-10-21 PROCEDURE — 85610 PROTHROMBIN TIME: CPT | Mod: NTX

## 2019-10-21 PROCEDURE — 27000248 HC VAD-ADDITIONAL DAY: Mod: NTX

## 2019-10-21 PROCEDURE — 83880 ASSAY OF NATRIURETIC PEPTIDE: CPT | Mod: 91,NTX

## 2019-10-21 PROCEDURE — 80048 BASIC METABOLIC PNL TOTAL CA: CPT | Mod: 91,NTX

## 2019-10-21 PROCEDURE — 97165 OT EVAL LOW COMPLEX 30 MIN: CPT | Mod: NTX

## 2019-10-21 PROCEDURE — 20600001 HC STEP DOWN PRIVATE ROOM: Mod: NTX

## 2019-10-21 PROCEDURE — 80048 BASIC METABOLIC PNL TOTAL CA: CPT | Mod: NTX

## 2019-10-21 PROCEDURE — 85730 THROMBOPLASTIN TIME PARTIAL: CPT | Mod: NTX

## 2019-10-21 PROCEDURE — 85025 COMPLETE CBC W/AUTO DIFF WBC: CPT | Mod: NTX

## 2019-10-21 PROCEDURE — 99223 1ST HOSP IP/OBS HIGH 75: CPT | Mod: NTX,,, | Performed by: INTERNAL MEDICINE

## 2019-10-21 PROCEDURE — 85610 PROTHROMBIN TIME: CPT | Mod: 91,NTX

## 2019-10-21 PROCEDURE — 83615 LACTATE (LD) (LDH) ENZYME: CPT | Mod: 91,NTX

## 2019-10-21 RX ORDER — WARFARIN 3 MG/1
3 TABLET ORAL DAILY
Status: DISCONTINUED | OUTPATIENT
Start: 2019-10-21 | End: 2019-10-21

## 2019-10-21 RX ORDER — AMIODARONE HYDROCHLORIDE 200 MG/1
400 TABLET ORAL 2 TIMES DAILY
Status: DISCONTINUED | OUTPATIENT
Start: 2019-10-21 | End: 2019-10-21

## 2019-10-21 RX ORDER — POTASSIUM CHLORIDE 750 MG/1
40 CAPSULE, EXTENDED RELEASE ORAL ONCE
Status: COMPLETED | OUTPATIENT
Start: 2019-10-21 | End: 2019-10-21

## 2019-10-21 RX ORDER — FUROSEMIDE 20 MG/1
20 TABLET ORAL EVERY OTHER DAY
Status: DISCONTINUED | OUTPATIENT
Start: 2019-10-22 | End: 2019-10-22

## 2019-10-21 RX ORDER — GABAPENTIN 400 MG/1
400 CAPSULE ORAL 2 TIMES DAILY
Status: DISCONTINUED | OUTPATIENT
Start: 2019-10-21 | End: 2019-10-22 | Stop reason: HOSPADM

## 2019-10-21 RX ORDER — VENLAFAXINE HYDROCHLORIDE 75 MG/1
150 CAPSULE, EXTENDED RELEASE ORAL DAILY
Status: DISCONTINUED | OUTPATIENT
Start: 2019-10-22 | End: 2019-10-22 | Stop reason: HOSPADM

## 2019-10-21 RX ORDER — LISINOPRIL 5 MG/1
5 TABLET ORAL 2 TIMES DAILY
Status: DISCONTINUED | OUTPATIENT
Start: 2019-10-21 | End: 2019-10-22 | Stop reason: HOSPADM

## 2019-10-21 RX ORDER — POTASSIUM CHLORIDE 750 MG/1
50 CAPSULE, EXTENDED RELEASE ORAL ONCE
Status: COMPLETED | OUTPATIENT
Start: 2019-10-21 | End: 2019-10-21

## 2019-10-21 RX ORDER — ONDANSETRON 4 MG/1
4 TABLET, ORALLY DISINTEGRATING ORAL ONCE
Status: COMPLETED | OUTPATIENT
Start: 2019-10-21 | End: 2019-10-21

## 2019-10-21 RX ORDER — AMIODARONE HYDROCHLORIDE 200 MG/1
400 TABLET ORAL DAILY
Status: DISCONTINUED | OUTPATIENT
Start: 2019-11-05 | End: 2019-10-21

## 2019-10-21 RX ORDER — SODIUM CHLORIDE 9 MG/ML
INJECTION, SOLUTION INTRAVENOUS CONTINUOUS
Status: ACTIVE | OUTPATIENT
Start: 2019-10-21 | End: 2019-10-21

## 2019-10-21 RX ADMIN — AMIODARONE HYDROCHLORIDE 1 MG/MIN: 1.8 INJECTION, SOLUTION INTRAVENOUS at 05:10

## 2019-10-21 RX ADMIN — LISINOPRIL 5 MG: 5 TABLET ORAL at 12:10

## 2019-10-21 RX ADMIN — MIRTAZAPINE 15 MG: 15 TABLET, FILM COATED ORAL at 12:10

## 2019-10-21 RX ADMIN — LISINOPRIL 5 MG: 5 TABLET ORAL at 10:10

## 2019-10-21 RX ADMIN — SPIRONOLACTONE 25 MG: 25 TABLET, FILM COATED ORAL at 08:10

## 2019-10-21 RX ADMIN — LISINOPRIL 5 MG: 5 TABLET ORAL at 08:10

## 2019-10-21 RX ADMIN — WARFARIN SODIUM 1.5 MG: 1 TABLET ORAL at 05:10

## 2019-10-21 RX ADMIN — AMIODARONE HYDROCHLORIDE 150 MG: 1.5 INJECTION, SOLUTION INTRAVENOUS at 05:10

## 2019-10-21 RX ADMIN — ASPIRIN 325 MG: 325 TABLET, COATED ORAL at 08:10

## 2019-10-21 RX ADMIN — PANTOPRAZOLE SODIUM 40 MG: 40 TABLET, DELAYED RELEASE ORAL at 08:10

## 2019-10-21 RX ADMIN — GABAPENTIN 400 MG: 400 CAPSULE ORAL at 08:10

## 2019-10-21 RX ADMIN — VENLAFAXINE 150 MG: 37.5 TABLET ORAL at 02:10

## 2019-10-21 RX ADMIN — POTASSIUM CHLORIDE 40 MEQ: 750 CAPSULE, EXTENDED RELEASE ORAL at 05:10

## 2019-10-21 RX ADMIN — GABAPENTIN 400 MG: 400 CAPSULE ORAL at 12:10

## 2019-10-21 RX ADMIN — ONDANSETRON 4 MG: 4 TABLET, ORALLY DISINTEGRATING ORAL at 10:10

## 2019-10-21 RX ADMIN — AMIODARONE HYDROCHLORIDE 1 MG/MIN: 1.8 INJECTION, SOLUTION INTRAVENOUS at 10:10

## 2019-10-21 RX ADMIN — POTASSIUM CHLORIDE 50 MEQ: 750 CAPSULE, EXTENDED RELEASE ORAL at 08:10

## 2019-10-21 RX ADMIN — MIRTAZAPINE 15 MG: 15 TABLET, FILM COATED ORAL at 10:10

## 2019-10-21 RX ADMIN — GABAPENTIN 400 MG: 400 CAPSULE ORAL at 10:10

## 2019-10-21 NOTE — HPI
50 yo WF with NICM, s/p HM3 implantation 9/10/19 (post up coarse uncomplicated with the exception of+ diaphragmatic stimulation of LV lead and pleural effusion with chest tube placement, atrial flutter with NADINE/DCCV), V-fib reported in setting of hypokalemia with appropriate shock from ICD on Amiodarone prior to LVAD placement, HLP transferred from [facility] for ICD shock.  Per report on transfer; she had 7 episodes of VT (6 terminated by overdrive pacing and 1 shock) in setting of normal lytes. Patient reports her ICD shock occurred today after she ate lunch and was bringing her plate to the kitchen.  She had no prodromal symptoms and reported no LOC.  She states prior to today she was feeling fine and had no new complaints.  She reports she has had some nausea for the last 10 day, but states it has not been worse than baseline since discharge from LVAD implantation admission.  She reports that she has been feeling some jumping under her left chest for the last week or so; reporting it feels similar to that symptoms she was having with she had diaphragmatic stimulation of LV lead during previous hospital stay.   She was seen in clinic on 10/14 for her second post op visit and was doing well; working with PT twice a week and feeling fine.    At that visit she was started on Lisinopril but states she wasn't able to start taking it until two days ago.   She denies SOB worse than baseline, weight gain, LE edema, dizziness, lightheadiness. Reports she has had some tenderness at her DLES the last couple days.

## 2019-10-21 NOTE — H&P
[facility]  Heart Transplant  H&P    Patient Name: Deborah Navas  MRN: [id number]  Admission Date: 10/20/2019  Attending Physician: Jolene Lua MD  Primary Care Provider: Primary Doctor No  Principal Problem:<principal problem not specified>    Subjective:     History of Present Illness:  50 yo WF with NICM, s/p HM3 implantation 9/10/19 (post up coarse uncomplicated with the exception of+ diaphragmatic stimulation of LV lead and pleural effusion with chest tube placement, atrial flutter with NADINE/DCCV), V-fib reported in setting of hypokalemia with appropriate shock from ICD on Amiodarone prior to LVAD placement, HLP transferred from [facility] for ICD shock.  Per report on transfer; she had 7 episodes of VT (6 terminated by overdrive pacing and 1 shock) in setting of normal lytes. Patient reports her ICD shock occurred today after she ate lunch and was bringing her plate to the kitchen.  She had no prodromal symptoms and reported no LOC.  She states prior to today she was feeling fine and had no new complaints.  She reports she has had some nausea for the last 10 day, but states it has not been worse than baseline since discharge from LVAD implantation admission.  She reports that she has been feeling some jumping under her left chest for the last week or so; reporting it feels similar to that symptoms she was having with she had diaphragmatic stimulation of LV lead during previous hospital stay.   She was seen in clinic on 10/14 for her second post op visit and was doing well; working with PT twice a week and feeling fine.     At that visit she was started on Lisinopril but states she wasn't able to start taking it until two days ago.   She denies SOB worse than baseline, weight gain, LE edema, dizziness, lightheadiness. Reports she has had some tenderness at her DLES the last couple days.             Past Medical History:   Diagnosis Date    Fractures     History of  ventricular fibrillation 9/4/2019    History of ventricular tachycardia 9/4/2019    Hyperlipidemia     Migraine     Osteoarthritis        Past Surgical History:   Procedure Laterality Date    BACK SURGERY      2007    BONE GRAFT Left     from Left hip to Left FA    eardrum reconstruction  1980    ELBOW SURGERY Left [id number]    FOREARM FRACTURE SURGERY Bilateral [id number]    multiple surgeries    INSERTION OF GRAFT TO PERICARDIUM  9/10/2019    Procedure: INSERTION, GRAFT, PERICARDIUM;  Surgeon: Shar Walden MD;  Location: [facility] OR [address] [place] [address];  Service: Cardiovascular;;    INSERTION OF IMPLANTABLE CARDIOVERTER-DEFIBRILLATOR (ICD) GENERATOR WITH TWO EXISTING LEADS      INSERTION OF PACEMAKER Left     LEFT VENTRICULAR ASSIST DEVICE N/A 9/10/2019    Procedure: INSERTION-LEFT VENTRICULAR ASSIST DEVICE;  Surgeon: Shar Walden MD;  Location: [facility] OR [address] [place] [address];  Service: Cardiovascular;  Laterality: N/A;  DT HM3     LUMBAR FUSION  2007    L4-L5    RIGHT HEART CATHETERIZATION Right 9/4/2019    Procedure: INSERTION, CATHETER, RIGHT HEART;  Surgeon: Vi Bryant MD;  Location: [facility] CATH LAB;  Service: Cardiology;  Laterality: Right;    SINUS SURGERY Right 1994    with lymph nodes    STERNAL WOUND CLOSURE  9/10/2019    Procedure: CLOSURE, WOUND, STERNUM;  Surgeon: Shar Walden MD;  Location: [facility] OR [address] [place] [address];  Service: Cardiovascular;;    TEMPOROMANDIBULAR JOINT SURGERY Right 1988    TONSILLECTOMY      TREATMENT OF CARDIAC ARRHYTHMIA N/A 9/24/2019    Procedure: CARDIOVERSION;  Surgeon: Moe Barrera MD;  Location: [facility] EP LAB;  Service: Cardiology;  Laterality: N/A;  AF, DCCV/NADINE, anes, DM, Rm 3073    TYMPANOSTOMY TUBE PLACEMENT  1971- 1979    multiple tube placements       Review of patient's allergies indicates:   Allergen Reactions    Adhesive Blisters     Reaction to area in chest and up only    Codeine Itching       No current facility-administered medications for this encounter.      Current  Outpatient Medications   Medication Sig    aspirin (ECOTRIN) 325 MG EC tablet Take 1 tablet (325 mg total) by mouth once daily.    furosemide (LASIX) 20 MG tablet Take 1 tablet (20 mg total) by mouth once daily.    gabapentin (NEURONTIN) 400 MG capsule Take 1 capsule (400 mg total) by mouth 2 (two) times daily.    lisinopril (PRINIVIL,ZESTRIL) 5 MG tablet Take 1 tablet (5 mg total) by mouth 2 (two) times daily.    mirtazapine (REMERON) 15 MG tablet Take 1 tablet (15 mg total) by mouth every evening.    oxyCODONE (OXYCONTIN) 20 mg 12 hr tablet Take 1 tablet (20 mg total) by mouth every 12 (twelve) hours.    oxyCODONE-acetaminophen (PERCOCET) 5-325 mg per tablet Take 1 tablet by mouth once daily.    pantoprazole (PROTONIX) 40 MG tablet Take 1 tablet (40 mg total) by mouth once daily.    senna-docusate 8.6-50 mg (PERICOLACE) 8.6-50 mg per tablet Take 2 tablets by mouth 2 (two) times daily as needed for Constipation.    spironolactone (ALDACTONE) 25 MG tablet Take 1 tablet (25 mg total) by mouth once daily.    VENLAFAXINE HCL (EFFEXOR ORAL) Take 150 mg by mouth once daily.    warfarin (COUMADIN) 3 MG tablet Take 1.5 tablets (4.5 mg total) by mouth Daily.     Family History     Problem Relation (Age of Onset)    Broken bones Maternal Grandmother    Cancer Paternal Grandmother    Dislocations Maternal Grandmother    Heart failure Paternal Grandfather, Maternal Grandfather    Migraines Mother, Maternal Grandmother, Paternal Grandmother, Paternal Grandfather, Maternal Grandfather    Obesity Paternal Grandmother    Osteoarthritis Mother, Maternal Grandmother, Paternal Grandmother, Paternal Grandfather, Maternal Grandfather, Father    Osteoporosis Maternal Grandmother    Scoliosis Maternal Grandmother    Stroke Father        Tobacco Use    Smoking status: Never Smoker    Smokeless tobacco: Never Used   Substance and Sexual Activity    Alcohol use: No    Drug use: No    Sexual activity: Not Currently      Review of Systems  Objective:     Vital Signs (Most Recent):    Vital Signs (24h Range):  Temp:  [98 °F (36.7 °C)] 98 °F (36.7 °C)  Pulse:  [95] 95  Resp:  [16] 16  SpO2:  [97 %] 97 %  BP: (108)/(95) 108/95     No data found.  There is no height or weight on file to calculate BMI.    No intake or output data in the 24 hours ending 10/20/19 1951    Physical Exam  Constitutional: laying in bed NAD  Neck: Normal range of motion. Neck supple. No JVD elevation  Cardiovascular: Normal rate, regular rhythm, Smooth VAD hum. No murmur heard.  Pulmonary/Chest: Effort normal. No stridor. No respiratory distress. She has decreased breath sounds in the left lower field. She has no wheezes. She has no rales.   Abdominal: Soft. Normal appearance, normal aorta and bowel sounds are normal. She exhibits no distension. There is no tenderness.   Musculoskeletal: Normal range of motion. She exhibits no edema or tenderness.   Neurological: She is alert and oriented to person, place, and time. She has normal strength and normal reflexes. Coordination normal.   Skin: Skin is warm and dry. No rash noted. No erythema.     Significant Labs:  CBC:  Recent Labs   Lab 10/14/19  1335   WBC 6.08   RBC 4.02   HGB 10.4*   HCT 36.7*   *   MCV 91   MCH 25.9*   MCHC 28.3*     BNP:  Recent Labs   Lab 10/14/19  1335   *     CMP:  Recent Labs   Lab 10/14/19  1335   *   CALCIUM 9.5   ALBUMIN 3.2*   PROT 7.2      K 3.7   CO2 24      BUN 15   CREATININE 1.4   ALKPHOS 86   ALT 14   AST 17   BILITOT 0.8      Coagulation:   Recent Labs   Lab 10/14/19  1335 10/17/19   INR 1.9* 3.3     LDH:  No results for input(s): LDH in the last 72 hours.  Microbiology:  Microbiology Results (last 7 days)     ** No results found for the last 168 hours. **          I have reviewed all pertinent labs within the past 24 hours.    Diagnostic Results:  I have reviewed all pertinent imaging results/findings within the past 24  hours.    Assessment/Plan:     Ventricular tachycardia  -hx of VT in setting of hypokalemia prior to LVAD placement.  She was on Amiodarone prior to LVAD, but currently not on it  -Will check electrolytes  -Medtronic ICD interrogation  -EP consultation prior to restarting Amiodarone    LVAD (left ventricular assist device) present  -HeartMate 3 Implanted 9/10/19 as DT.  -Implanted by Dr. Walden  -INR pending. Goal INR 2.0-3.0. Will continue Coumadin for now.  Previous home dose: 3mg QDaiy and 1.5 mg on Fridays  -Antiplatelets:  mg.  -LDH is pending Will monitor daily.  -Speed set at 5200  -Not listed for OHTx: was declined due to pulmonary hypertension and incomplete care giving plan  -nursing staff to do dressing change tonight to evaluate etiology of tenderness    Procedure: Device Interrogation Including analysis of device parameters  Current Settings: Ventricular Assist Device  Review of device function is stable    TXP LVAD INTERROGATIONS 10/14/2019 10/7/2019 10/1/2019 10/1/2019 10/1/2019 10/1/2019 10/1/2019   Type - - HeartMate3 HeartMate3 HeartMate3 HeartMate3 HeartMate3   Flow - - 4.0 4.2 4.2 4.3 4.1   Speed - - 5200 5200 5200 5200 5200   PI - - 3.0 3.1 3.1 3.1 3.3   Power (Orellana) - - 3.8 3.7 3.7 3.5 3.9   LSL - - - - 4800 4800 4800   Pulsatility Intermittent pulse Pulse - - Intermittent pulse Intermittent pulse Intermittent pulse       Chronic systolic congestive heart failure  -NICM  -Last 2D Echo 9/25/19: with speed at 5200 LVEF 20%, LVEDD 5.3 cm  -Euvolemic on examination today  -Current diuretic regimen: Lasix 20mg QDaily  -GDMT with home dose of Lisinopril and Aldactone  -2g Na dietary restriction, 1500 mL fluid restriction, strict I/Os      ICD (implantable cardioverter-defibrillator) in place  -Medtronic ICD  -See above VT    CKD (chronic kidney disease)  -Baseline creat 1.1-1.4 (was 1.5-1.8 during last admission)        ZAC Kelly  Heart Transplant  [facility]

## 2019-10-21 NOTE — SUBJECTIVE & OBJECTIVE
Interval History: Sister at bedside. No ICD shocks overnight. Intermittent PNS, non positional in nature. Drive line pain still present.Still feeling short of breath but has been improving slowly at home. Questions answered.      Continuous Infusions:  Scheduled Meds:   aspirin  325 mg Oral Daily    [START ON 10/22/2019] furosemide  20 mg Oral Every other day    gabapentin  400 mg Oral BID    lisinopril  5 mg Oral BID    mirtazapine  15 mg Oral QHS    pantoprazole  40 mg Oral Daily    spironolactone  25 mg Oral Daily    venlafaxine  150 mg Oral Daily    warfarin  4.5 mg Oral Daily     PRN Meds:senna-docusate 8.6-50 mg    Review of patient's allergies indicates:   Allergen Reactions    Adhesive Blisters     Reaction to area in chest and up only    Codeine Itching     Objective:     Vital Signs (Most Recent):  Temp: 98.6 °F (37 °C) (10/21/19 0752)  Pulse: 92 (10/21/19 0752)  Resp: 18 (10/21/19 0752)  BP: (!) 88/0 (10/21/19 0752)  SpO2: 95 % (10/21/19 0752) Vital Signs (24h Range):  Temp:  [97.9 °F (36.6 °C)-98.6 °F (37 °C)] 98.6 °F (37 °C)  Pulse:  [71-99] 92  Resp:  [16-18] 18  SpO2:  [95 %-97 %] 95 %  BP: ()/(0-95) 88/0     Patient Vitals for the past 72 hrs (Last 3 readings):   Weight   10/21/19 0500 101.5 kg (223 lb 12.3 oz)   10/20/19 2155 101.5 kg (223 lb 12.3 oz)     Body mass index is 35.05 kg/m².      Intake/Output Summary (Last 24 hours) at 10/21/2019 1002  Last data filed at 10/21/2019 0500  Gross per 24 hour   Intake 140 ml   Output 680 ml   Net -540 ml       Hemodynamic Parameters:       Telemetry: frequent PVCs    Physical Exam   Constitutional: She is oriented to person, place, and time. She appears well-developed and well-nourished.   HENT:   Head: Normocephalic and atraumatic.   Eyes: Pupils are equal, round, and reactive to light. EOM are normal.   Neck: Normal range of motion. Neck supple. No JVD present.   Cardiovascular: Normal rate and regular rhythm.   VAD hum smooth,  nonpulsatile   Pulmonary/Chest: Effort normal and breath sounds normal. No stridor. No respiratory distress.   Abdominal: Soft. Bowel sounds are normal. She exhibits no distension. There is no tenderness.   Musculoskeletal: Normal range of motion. She exhibits no edema.   Neurological: She is alert and oriented to person, place, and time.   Skin: Skin is warm and dry. Capillary refill takes 2 to 3 seconds.   Psychiatric: She has a normal mood and affect. Her behavior is normal. Judgment and thought content normal.   Nursing note and vitals reviewed.      Significant Labs:  CBC:  Recent Labs   Lab 10/14/19  1335 10/21/19  0712   WBC 6.08 5.09   RBC 4.02 4.27   HGB 10.4* 11.0*   HCT 36.7* 37.2   * 283   MCV 91 87   MCH 25.9* 25.8*   MCHC 28.3* 29.6*     BNP:  Recent Labs   Lab 10/14/19  1335 10/21/19  0049 10/21/19  0712   * 384* 276*     CMP:  Recent Labs   Lab 10/14/19  1335 10/21/19  0049 10/21/19  0712   * 101 108   CALCIUM 9.5 9.2 9.6   ALBUMIN 3.2*  --  3.4*   PROT 7.2  --  7.2    140 142   K 3.7 3.5 3.6   CO2 24 24 27    106 104   BUN 15 19 18   CREATININE 1.4 1.2 1.3   ALKPHOS 86  --  88   ALT 14  --  17   AST 17  --  21   BILITOT 0.8  --  0.7      Coagulation:   Recent Labs   Lab 10/17/19 10/21/19  0049 10/21/19  0712   INR 3.3 3.2* 3.1*   APTT  --   --  37.5*     LDH:  Recent Labs   Lab 10/21/19  0049 10/21/19  0712    255     Microbiology:  Microbiology Results (last 7 days)     Procedure Component Value Units Date/Time    Culture, Anaerobe [[id number]]     Order Status:  No result Specimen:  Skin from Abdomen     Aerobic culture [[id number]]     Order Status:  No result Specimen:  Skin from Abdomen           I have reviewed all pertinent labs within the past 24 hours.    Estimated Creatinine Clearance: 63.4 mL/min (based on SCr of 1.3 mg/dL).    Diagnostic Results:  I have reviewed and interpreted all pertinent imaging results/findings within the past 24 hours.

## 2019-10-21 NOTE — PT/OT/SLP PROGRESS
Physical Therapy  Consult / PT discontinued    Patient Name:  Deborah Navas   MRN:  [id number]  Admitting Diagnosis:  Ventricular tachycardia   Recent Surgery: * No surgery found *    Admit Date: 10/20/2019  Length of Stay: 1 days    Physical Therapy orders received and acknowledged. Discussed case with evaluating OT, pt independent with all mobility and LVAD care. Pt with no concerns for mobility or returning home. Pt with no acute PT needs. PT orders discontinued.    Asia Guzman PT, DPT  10/21/2019   Pager: [phone]

## 2019-10-21 NOTE — TELEPHONE ENCOUNTER
"10/20/19: Patient paged VAD coordinator on call stating that she was shocked by her defib x1 but has been feeling "weak" for a few days. Advised patient to seek treatment in local ED.     1650: Patient paged that she was in ED and was being triaged.     1854: Received call from ZAC Luis @ [facility]. States that patient has had multiple episodes of VT, 7 today. 6 times patient was successfully overdrive paced and 1 time was defibrillated.     Discussed all with Dr. Lua and he wishes to transfer patient to CSU/CTSU.   "

## 2019-10-21 NOTE — SUBJECTIVE & OBJECTIVE
Past Medical History:   Diagnosis Date    Fractures     History of ventricular fibrillation 9/4/2019    History of ventricular tachycardia 9/4/2019    Hyperlipidemia     Migraine     Osteoarthritis        Past Surgical History:   Procedure Laterality Date    BACK SURGERY      2007    BONE GRAFT Left     from Left hip to Left FA    eardrum reconstruction  1980    ELBOW SURGERY Left [id number]    FOREARM FRACTURE SURGERY Bilateral [id number]    multiple surgeries    INSERTION OF GRAFT TO PERICARDIUM  9/10/2019    Procedure: INSERTION, GRAFT, PERICARDIUM;  Surgeon: Shar Walden MD;  Location: [facility] OR [address] [place] [address];  Service: Cardiovascular;;    INSERTION OF IMPLANTABLE CARDIOVERTER-DEFIBRILLATOR (ICD) GENERATOR WITH TWO EXISTING LEADS      INSERTION OF PACEMAKER Left     LEFT VENTRICULAR ASSIST DEVICE N/A 9/10/2019    Procedure: INSERTION-LEFT VENTRICULAR ASSIST DEVICE;  Surgeon: Shar Walden MD;  Location: [facility] OR [address] [place] [address];  Service: Cardiovascular;  Laterality: N/A;  DT HM3     LUMBAR FUSION  2007    L4-L5    RIGHT HEART CATHETERIZATION Right 9/4/2019    Procedure: INSERTION, CATHETER, RIGHT HEART;  Surgeon: Vi Bryant MD;  Location: [facility] CATH LAB;  Service: Cardiology;  Laterality: Right;    SINUS SURGERY Right 1994    with lymph nodes    STERNAL WOUND CLOSURE  9/10/2019    Procedure: CLOSURE, WOUND, STERNUM;  Surgeon: Shar Walden MD;  Location: [facility] OR [address] [place] [address];  Service: Cardiovascular;;    TEMPOROMANDIBULAR JOINT SURGERY Right 1988    TONSILLECTOMY      TREATMENT OF CARDIAC ARRHYTHMIA N/A 9/24/2019    Procedure: CARDIOVERSION;  Surgeon: Moe Barrera MD;  Location: [facility] EP LAB;  Service: Cardiology;  Laterality: N/A;  AF, DCCV/NADINE, anes, DM, Rm 3073    TYMPANOSTOMY TUBE PLACEMENT  1971- 1979    multiple tube placements       Review of patient's allergies indicates:   Allergen Reactions    Adhesive Blisters     Reaction to area in chest and up only    Codeine Itching       No  current facility-administered medications for this encounter.      Current Outpatient Medications   Medication Sig    aspirin (ECOTRIN) 325 MG EC tablet Take 1 tablet (325 mg total) by mouth once daily.    furosemide (LASIX) 20 MG tablet Take 1 tablet (20 mg total) by mouth once daily.    gabapentin (NEURONTIN) 400 MG capsule Take 1 capsule (400 mg total) by mouth 2 (two) times daily.    lisinopril (PRINIVIL,ZESTRIL) 5 MG tablet Take 1 tablet (5 mg total) by mouth 2 (two) times daily.    mirtazapine (REMERON) 15 MG tablet Take 1 tablet (15 mg total) by mouth every evening.    oxyCODONE (OXYCONTIN) 20 mg 12 hr tablet Take 1 tablet (20 mg total) by mouth every 12 (twelve) hours.    oxyCODONE-acetaminophen (PERCOCET) 5-325 mg per tablet Take 1 tablet by mouth once daily.    pantoprazole (PROTONIX) 40 MG tablet Take 1 tablet (40 mg total) by mouth once daily.    senna-docusate 8.6-50 mg (PERICOLACE) 8.6-50 mg per tablet Take 2 tablets by mouth 2 (two) times daily as needed for Constipation.    spironolactone (ALDACTONE) 25 MG tablet Take 1 tablet (25 mg total) by mouth once daily.    VENLAFAXINE HCL (EFFEXOR ORAL) Take 150 mg by mouth once daily.    warfarin (COUMADIN) 3 MG tablet Take 1.5 tablets (4.5 mg total) by mouth Daily.     Family History     Problem Relation (Age of Onset)    Broken bones Maternal Grandmother    Cancer Paternal Grandmother    Dislocations Maternal Grandmother    Heart failure Paternal Grandfather, Maternal Grandfather    Migraines Mother, Maternal Grandmother, Paternal Grandmother, Paternal Grandfather, Maternal Grandfather    Obesity Paternal Grandmother    Osteoarthritis Mother, Maternal Grandmother, Paternal Grandmother, Paternal Grandfather, Maternal Grandfather, Father    Osteoporosis Maternal Grandmother    Scoliosis Maternal Grandmother    Stroke Father        Tobacco Use    Smoking status: Never Smoker    Smokeless tobacco: Never Used   Substance and Sexual Activity     Alcohol use: No    Drug use: No    Sexual activity: Not Currently     Review of Systems  Objective:     Vital Signs (Most Recent):    Vital Signs (24h Range):  Temp:  [98 °F (36.7 °C)] 98 °F (36.7 °C)  Pulse:  [95] 95  Resp:  [16] 16  SpO2:  [97 %] 97 %  BP: (108)/(95) 108/95     No data found.  There is no height or weight on file to calculate BMI.    No intake or output data in the 24 hours ending 10/20/19 1951    Physical Exam  Constitutional: laying in bed NAD  Neck: Normal range of motion. Neck supple. JVD elevation just above the clavicle  Cardiovascular: Normal rate, regular rhythm, Smooth VAD hum. No murmur heard.  Pulmonary/Chest: Effort normal. No stridor. No respiratory distress. She has decreased breath sounds in the left lower field. She has no wheezes. She has no rales.   Abdominal: Soft. Normal appearance, normal aorta and bowel sounds are normal. She exhibits no distension. There is no tenderness.   Musculoskeletal: Normal range of motion. She exhibits no edema or tenderness.   Neurological: She is alert and oriented to person, place, and time. She has normal strength and normal reflexes. Coordination normal.   Skin: Skin is warm and dry. No rash noted. No erythema.     Significant Labs:  CBC:  Recent Labs   Lab 10/14/19  1335   WBC 6.08   RBC 4.02   HGB 10.4*   HCT 36.7*   *   MCV 91   MCH 25.9*   MCHC 28.3*     BNP:  Recent Labs   Lab 10/14/19  1335   *     CMP:  Recent Labs   Lab 10/14/19  1335   *   CALCIUM 9.5   ALBUMIN 3.2*   PROT 7.2      K 3.7   CO2 24      BUN 15   CREATININE 1.4   ALKPHOS 86   ALT 14   AST 17   BILITOT 0.8      Coagulation:   Recent Labs   Lab 10/14/19  1335 10/17/19   INR 1.9* 3.3     LDH:  No results for input(s): LDH in the last 72 hours.  Microbiology:  Microbiology Results (last 7 days)     ** No results found for the last 168 hours. **          I have reviewed all pertinent labs within the past 24 hours.    Diagnostic Results:  I  have reviewed all pertinent imaging results/findings within the past 24 hours.

## 2019-10-21 NOTE — PROGRESS NOTES
10/21/2019  Christo Cooper    Current provider:  Jolene Lua MD      I, Christo Cooper, rounded on Deborah Navas to ensure all mechanical assist device settings (IABP or VAD) were appropriate and all parameters were within limits.  I was able to ensure all back up equipment was present, the staff had no issues, and the Perfusion Department daily rounding was complete.    7:38 AM

## 2019-10-21 NOTE — CONSULTS
"[facility]  Cardiac Electrophysiology  Consult Note    Admission Date: 10/20/2019  Code Status: Full Code   Attending Provider: Jolene Lua MD  Consulting Provider: Luly Chau MD  Principal Problem:Ventricular tachycardia    Inpatient consult to Electrophysiology  Consult performed by: Luly Chau MD  Consult ordered by: ZAC Kelly        Subjective:     Chief Complaint:  ICD shock     HPI:   Deborah Navas is a 49 y.o. F with HFrEF (EF 20%) s/p HM3 implantation 9/10/19 (post up coarse uncomplicated with the exception of+ diaphragmatic stimulation of LV lead and pleural effusion with chest tube placement, atrial flutter with NADINE/DCCV), V-fib reported in setting of hypokalemia with appropriate shock from ICD on Amiodarone prior to LVAD placement, who was transferred from our [facility] for an ICD shock.     She reports one-week history of mild nausea but denies any decreased appetite or unintentional weight loss. She reports finishing lunch and bringing her plate to the kitchen around 3:30pm this afternoon when she felt her ICD fired. She presented to her local ER, where device interrogation noted 7 episodes of VT (6 terminated by overdrive pacing and 1 shock) in setting of normal lytes. Of note, she was previously taking amiodarone 200mg BID     She reports that she has been feeling some "jumping" under her left chest for the last week similar to that symptoms she was having with she had diaphragmatic stimulation of LV lead during previous hospital stay. That has attributed to her feeling nauseous. She denies SOB worse than baseline, weight gain, LE edema, dizziness, lightheadiness. Reports she has had some tenderness at her DLES the last couple   days.     Pt had a dual chamber Medtronic ICD placed Feb 2017 by Dr. Bose at [facility] for primary prevention. She reports ~ 6 ICD shocks between Feb 2019 - Aug 2019 this year which led to her being referred " here for MCS eval and getting an LVAD 9/10/19.    Past Medical History:   Diagnosis Date    Fractures     History of ventricular fibrillation 9/4/2019    History of ventricular tachycardia 9/4/2019    Hyperlipidemia     Migraine     Osteoarthritis        Past Surgical History:   Procedure Laterality Date    BACK SURGERY      2007    BONE GRAFT Left     from Left hip to Left FA    eardrum reconstruction  1980    ELBOW SURGERY Left [id number]    FOREARM FRACTURE SURGERY Bilateral [id number]    multiple surgeries    INSERTION OF GRAFT TO PERICARDIUM  9/10/2019    Procedure: INSERTION, GRAFT, PERICARDIUM;  Surgeon: Shar Walden MD;  Location: [facility] OR [address] [place] [address];  Service: Cardiovascular;;    INSERTION OF IMPLANTABLE CARDIOVERTER-DEFIBRILLATOR (ICD) GENERATOR WITH TWO EXISTING LEADS      INSERTION OF PACEMAKER Left     LEFT VENTRICULAR ASSIST DEVICE N/A 9/10/2019    Procedure: INSERTION-LEFT VENTRICULAR ASSIST DEVICE;  Surgeon: Shar Walden MD;  Location: [facility] OR [address] [place] [address];  Service: Cardiovascular;  Laterality: N/A;  DT HM3     LUMBAR FUSION  2007    L4-L5    RIGHT HEART CATHETERIZATION Right 9/4/2019    Procedure: INSERTION, CATHETER, RIGHT HEART;  Surgeon: Vi Bryant MD;  Location: [facility] CATH LAB;  Service: Cardiology;  Laterality: Right;    SINUS SURGERY Right 1994    with lymph nodes    STERNAL WOUND CLOSURE  9/10/2019    Procedure: CLOSURE, WOUND, STERNUM;  Surgeon: Shar Walden MD;  Location: [facility] OR [address] [place] [address];  Service: Cardiovascular;;    TEMPOROMANDIBULAR JOINT SURGERY Right 1988    TONSILLECTOMY      TREATMENT OF CARDIAC ARRHYTHMIA N/A 9/24/2019    Procedure: CARDIOVERSION;  Surgeon: Moe Barrera MD;  Location: [facility] EP LAB;  Service: Cardiology;  Laterality: N/A;  AF, DCCV/NADINE, anes, DM, Rm 3073    TYMPANOSTOMY TUBE PLACEMENT  1971- 1979    multiple tube placements       Review of patient's allergies indicates:   Allergen Reactions    Adhesive Blisters     Reaction to area in  chest and up only    Codeine Itching       No current facility-administered medications on file prior to encounter.      Current Outpatient Medications on File Prior to Encounter   Medication Sig    aspirin (ECOTRIN) 325 MG EC tablet Take 1 tablet (325 mg total) by mouth once daily.    furosemide (LASIX) 20 MG tablet Take 1 tablet (20 mg total) by mouth once daily.    gabapentin (NEURONTIN) 400 MG capsule Take 1 capsule (400 mg total) by mouth 2 (two) times daily.    lisinopril (PRINIVIL,ZESTRIL) 5 MG tablet Take 1 tablet (5 mg total) by mouth 2 (two) times daily.    mirtazapine (REMERON) 15 MG tablet Take 1 tablet (15 mg total) by mouth every evening.    oxyCODONE-acetaminophen (PERCOCET) 5-325 mg per tablet Take 1 tablet by mouth once daily.    pantoprazole (PROTONIX) 40 MG tablet Take 1 tablet (40 mg total) by mouth once daily.    senna-docusate 8.6-50 mg (PERICOLACE) 8.6-50 mg per tablet Take 2 tablets by mouth 2 (two) times daily as needed for Constipation.    spironolactone (ALDACTONE) 25 MG tablet Take 1 tablet (25 mg total) by mouth once daily.    VENLAFAXINE HCL (EFFEXOR ORAL) Take 150 mg by mouth once daily.    warfarin (COUMADIN) 3 MG tablet Take 1.5 tablets (4.5 mg total) by mouth Daily.     Family History     Problem Relation (Age of Onset)    Broken bones Maternal Grandmother    Cancer Paternal Grandmother    Dislocations Maternal Grandmother    Heart failure Paternal Grandfather, Maternal Grandfather    Migraines Mother, Maternal Grandmother, Paternal Grandmother, Paternal Grandfather, Maternal Grandfather    Obesity Paternal Grandmother    Osteoarthritis Mother, Maternal Grandmother, Paternal Grandmother, Paternal Grandfather, Maternal Grandfather, Father    Osteoporosis Maternal Grandmother    Scoliosis Maternal Grandmother    Stroke Father        Tobacco Use    Smoking status: Never Smoker    Smokeless tobacco: Never Used   Substance and Sexual Activity    Alcohol use: No    Drug  use: No    Sexual activity: Not Currently     Review of Systems   Constitution: Negative for chills, fever and weight gain.   HENT: Negative for congestion.    Eyes: Negative for visual disturbance.   Cardiovascular: Negative for chest pain, claudication, dyspnea on exertion, leg swelling, orthopnea, palpitations and syncope.   Respiratory: Negative for cough, shortness of breath and snoring.    Hematologic/Lymphatic: Does not bruise/bleed easily.   Skin: Negative for rash.   Musculoskeletal: Negative for muscle cramps and myalgias.   Gastrointestinal: Negative for bloating, abdominal pain, constipation, diarrhea and melena.   Genitourinary: Negative for bladder incontinence.   Neurological: Negative for excessive daytime sleepiness, focal weakness and weakness.   Psychiatric/Behavioral: Negative for depression and suicidal ideas.     Objective:     Vital Signs (Most Recent):  Temp: 98.6 °F (37 °C) (10/21/19 0752)  Pulse: 92 (10/21/19 0752)  Resp: 18 (10/21/19 0752)  BP: (!) 88/0 (10/21/19 0752)  SpO2: 95 % (10/21/19 0752) Vital Signs (24h Range):  Temp:  [97.9 °F (36.6 °C)-98.6 °F (37 °C)] 98.6 °F (37 °C)  Pulse:  [71-99] 92  Resp:  [16-18] 18  SpO2:  [95 %-97 %] 95 %  BP: ()/(0-95) 88/0       Weight: 101.5 kg (223 lb 12.3 oz)  Body mass index is 35.05 kg/m².    SpO2: 95 %  O2 Device (Oxygen Therapy): room air    Physical Exam   Constitutional: She is oriented to person, place, and time. She appears well-developed and well-nourished.   HENT:   Head: Normocephalic and atraumatic.   Eyes: Pupils are equal, round, and reactive to light.   Neck: No JVD present.   Cardiovascular: Normal rate and regular rhythm.   No murmur heard.  Pulmonary/Chest: Effort normal and breath sounds normal. No respiratory distress.   Abdominal: Soft. Bowel sounds are normal. She exhibits no distension. There is no tenderness.   Musculoskeletal: She exhibits no edema.   Neurological: She is alert and oriented to person, place, and  time.   Skin: Skin is warm and dry. No erythema.   Psychiatric: Her behavior is normal. Judgment and thought content normal.   Vitals reviewed.      Significant Labs:   CMP:   Recent Labs   Lab 10/21/19  0049 10/21/19  0712    142   K 3.5 3.6    104   CO2 24 27    108   BUN 19 18   CREATININE 1.2 1.3   CALCIUM 9.2 9.6   PROT  --  7.2   ALBUMIN  --  3.4*   BILITOT  --  0.7   ALKPHOS  --  88   AST  --  21   ALT  --  17   ANIONGAP 10 11   ESTGFRAFRICA >60.0 55.3*   EGFRNONAA 52.8* 48.0*    and CBC:   Recent Labs   Lab 10/21/19  0712   WBC 5.09   HGB 11.0*   HCT 37.2          Significant Imaging: n/a    ICD interrogation:   Medtronic BiV ICD   Implanted Feb 2017 for primary prevention  Mode: DDD  Lower rate: 60 bpm  Upper rate: 130 bpm  Paced  ms  Sensed  ms    Impedance  A lead: 418 ohms  RV lead: 361 ohms  LV lead 513 ohms       Detection  AT/AF Monitor > 171 bpm Detection is not on. Burst + Ramp, CV off  VF On > 222 bpm ATP before charging 35J x 6  FVT Via -222 bpm Ramp (2), 25J, 35J x 4  VT On 146-222 bpm Burst (3), Ramp (3), Burst (3), Ramp (2), Ramp (2), Ramp (2)    Pacing since 9/24/19: total  92.4%, AS- 94.9%    Assessment and Plan:     * Ventricular tachycardia  Deborah Navas is a 50 y/o F with HFrEF (EF 20%) s/p HM3 implantation 9/10/19 (post up coarse uncomplicated with the exception of+ diaphragmatic stimulation of LV lead and pleural effusion with chest tube placement, atrial flutter with NADINE/DCCV), V-fib reported in setting of hypokalemia with appropriate shock from ICD on Amiodarone prior to LVAD placement, who was transferred from [facility] for an ICD shock.   - dual chamber Medtronic ICD placed Feb 2017 by Dr. Bose at [facility] for primary prevention. Last shock: 10/20/19 for VT  - MMVT of NICM etiology; device attempted to fast-pace terminate rhythm and successfully shocking ~ 15:34 on 10/20/19. VT episode on 10/19/19  successfully was paced out of.  - no reversible etiologies noted (euvolemic, no active infection, electrolytes wnl)  - h/o LV lead stimulating diaphragm -> will reprogram to turn off LV lead and maximize AV delay.   - would restart PO amiodarone with PO load. Consider 400mg BID x2 weeks followed by 400mg daily  - may consider starting beta-blockers: metoprolol 25mg daily  - continue tele monitoring  - ensure K > 4, Mg >2    Thank you for your consult. Please contact us if you have any additional questions.    Patient seen and plan of care discussed with Dr. Pace.    Luly Chau MD  Cardiac Electrophysiology  [facility]

## 2019-10-21 NOTE — HPI
"Deborah Navas is a 49 y.o. F with HFrEF (EF 20%) s/p HM3 implantation 9/10/19 (post up coarse uncomplicated with the exception of+ diaphragmatic stimulation of LV lead and pleural effusion with chest tube placement, atrial flutter with NADINE/DCCV), V-fib reported in setting of hypokalemia with appropriate shock from ICD on Amiodarone prior to LVAD placement, who was transferred from [facility] for an ICD shock.     She reports one-week history of mild nausea but denies any decreased appetite or unintentional weight loss. She reports finishing lunch and bringing her plate to the kitchen around 3:30pm this afternoon when she felt her ICD fired. She presented to her local ER, where device interrogation noted 7 episodes of VT (6 terminated by overdrive pacing and 1 shock) in setting of normal lytes. Of note, she was previously taking amiodarone 200mg BID     She reports that she has been feeling some "jumping" under her left chest for the last week similar to that symptoms she was having with she had diaphragmatic stimulation of LV lead during previous hospital stay. That has attributed to her feeling nauseous. She denies SOB worse than baseline, weight gain, LE edema, dizziness, lightheadiness. Reports she has had some tenderness at her DLES the last couple   days.     Pt had a dual chamber Medtronic ICD placed Feb 2017 by Dr. Bose at [facility] for primary prevention. She reports ~ 6 ICD shocks between Feb 2019 - Aug 2019 this year which led to her being referred here for MCS eval and getting an LVAD 9/10/19.  "

## 2019-10-21 NOTE — PLAN OF CARE
Problem: Occupational Therapy Goal  Goal: Occupational Therapy Goal  10/21/2019 1423 by Johanna Oliva OT  Outcome: Met  10/21/2019 1423 by Johanna Oliva OT  Outcome: Ongoing, Progressing    Intial eval completed   No goals established; pt at baseline with mobility and ADLs.   Pt d/c from acute OT 10/21/19    Johanna Oliva, OTR/L  Pager: [phone]  10/21/2019

## 2019-10-21 NOTE — ASSESSMENT & PLAN NOTE
-NICM  -Last 2D Echo 9/25/19: with speed at 5200 LVEF 20%, LVEDD 5.3 cm  -Euvolemic on examination today  -Current diuretic regimen: Lasix 20mg QDaily, will change to QOD for now   -GDMT with home dose of Lisinopril and Aldactone  -2g Na dietary restriction, 1500 mL fluid restriction, strict I/Os

## 2019-10-21 NOTE — ASSESSMENT & PLAN NOTE
-Patient is non-pulsatile  -Doppler goal 60-90 mmHg  -Blood pressure controlled over the last 24 hours  -Antihypertensive medications include Spironolactone, Lisinopril, HCTZ  -Will continue current regimen.

## 2019-10-21 NOTE — PROGRESS NOTES
to see pt in order to assess needs given admission. Worker spoke to pt with friend in attendance.    Completed assessment note to follow.    96

## 2019-10-21 NOTE — NURSING
Pt transported to [facility] via stretcher by transporter with telemetry and emergency VAD equipment.

## 2019-10-21 NOTE — PROGRESS NOTES
[facility]  Heart Transplant  Progress Note    Patient Name: Deborah Navas  MRN: [id number]  Admission Date: 10/20/2019  Hospital Length of Stay: 1 days  Attending Physician: Jolene Lua MD  Primary Care Provider: Primary Doctor No  Principal Problem:Ventricular tachycardia    Subjective:     Interval History: Sister at bedside. No ICD shocks overnight. Intermittent PNS, non positional in nature. Drive line pain still present.Still feeling short of breath but has been improving slowly at home. Questions answered.      Continuous Infusions:  Scheduled Meds:   aspirin  325 mg Oral Daily    [START ON 10/22/2019] furosemide  20 mg Oral Every other day    gabapentin  400 mg Oral BID    lisinopril  5 mg Oral BID    mirtazapine  15 mg Oral QHS    pantoprazole  40 mg Oral Daily    spironolactone  25 mg Oral Daily    venlafaxine  150 mg Oral Daily    warfarin  4.5 mg Oral Daily     PRN Meds:senna-docusate 8.6-50 mg    Review of patient's allergies indicates:   Allergen Reactions    Adhesive Blisters     Reaction to area in chest and up only    Codeine Itching     Objective:     Vital Signs (Most Recent):  Temp: 98.6 °F (37 °C) (10/21/19 0752)  Pulse: 92 (10/21/19 0752)  Resp: 18 (10/21/19 0752)  BP: (!) 88/0 (10/21/19 0752)  SpO2: 95 % (10/21/19 0752) Vital Signs (24h Range):  Temp:  [97.9 °F (36.6 °C)-98.6 °F (37 °C)] 98.6 °F (37 °C)  Pulse:  [71-99] 92  Resp:  [16-18] 18  SpO2:  [95 %-97 %] 95 %  BP: ()/(0-95) 88/0     Patient Vitals for the past 72 hrs (Last 3 readings):   Weight   10/21/19 0500 101.5 kg (223 lb 12.3 oz)   10/20/19 2155 101.5 kg (223 lb 12.3 oz)     Body mass index is 35.05 kg/m².      Intake/Output Summary (Last 24 hours) at 10/21/2019 1002  Last data filed at 10/21/2019 0500  Gross per 24 hour   Intake 140 ml   Output 680 ml   Net -540 ml       Hemodynamic Parameters:       Telemetry: frequent PVCs    Physical Exam   Constitutional: She is oriented to  person, place, and time. She appears well-developed and well-nourished.   HENT:   Head: Normocephalic and atraumatic.   Eyes: Pupils are equal, round, and reactive to light. EOM are normal.   Neck: Normal range of motion. Neck supple. No JVD present.   Cardiovascular: Normal rate and regular rhythm.   VAD hum smooth, nonpulsatile   Pulmonary/Chest: Effort normal and breath sounds normal. No stridor. No respiratory distress.   Abdominal: Soft. Bowel sounds are normal. She exhibits no distension. There is no tenderness.   Musculoskeletal: Normal range of motion. She exhibits no edema.   Neurological: She is alert and oriented to person, place, and time.   Skin: Skin is warm and dry. Capillary refill takes 2 to 3 seconds.   Psychiatric: She has a normal mood and affect. Her behavior is normal. Judgment and thought content normal.   Nursing note and vitals reviewed.      Significant Labs:  CBC:  Recent Labs   Lab 10/14/19  1335 10/21/19  0712   WBC 6.08 5.09   RBC 4.02 4.27   HGB 10.4* 11.0*   HCT 36.7* 37.2   * 283   MCV 91 87   MCH 25.9* 25.8*   MCHC 28.3* 29.6*     BNP:  Recent Labs   Lab 10/14/19  1335 10/21/19  0049 10/21/19  0712   * 384* 276*     CMP:  Recent Labs   Lab 10/14/19  1335 10/21/19  0049 10/21/19  0712   * 101 108   CALCIUM 9.5 9.2 9.6   ALBUMIN 3.2*  --  3.4*   PROT 7.2  --  7.2    140 142   K 3.7 3.5 3.6   CO2 24 24 27    106 104   BUN 15 19 18   CREATININE 1.4 1.2 1.3   ALKPHOS 86  --  88   ALT 14  --  17   AST 17  --  21   BILITOT 0.8  --  0.7      Coagulation:   Recent Labs   Lab 10/17/19 10/21/19  0049 10/21/19  0712   INR 3.3 3.2* 3.1*   APTT  --   --  37.5*     LDH:  Recent Labs   Lab 10/21/19  0049 10/21/19  0712    255     Microbiology:  Microbiology Results (last 7 days)     Procedure Component Value Units Date/Time    Culture, Anaerobe [[id number]]     Order Status:  No result Specimen:  Skin from Abdomen     Aerobic culture [[id number]]     Order  Status:  No result Specimen:  Skin from Abdomen           I have reviewed all pertinent labs within the past 24 hours.    Estimated Creatinine Clearance: 63.4 mL/min (based on SCr of 1.3 mg/dL).    Diagnostic Results:  I have reviewed and interpreted all pertinent imaging results/findings within the past 24 hours.    Assessment and Plan:     50 yo WF with NICM, s/p HM3 implantation 9/10/19 (post up coarse uncomplicated with the exception of+ diaphragmatic stimulation of LV lead and pleural effusion with chest tube placement, atrial flutter with NADINE/DCCV), V-fib reported in setting of hypokalemia with appropriate shock from ICD on Amiodarone prior to LVAD placement, HLP transferred from [facility] for ICD shock.  Per report on transfer; she had 7 episodes of VT (6 terminated by overdrive pacing and 1 shock) in setting of normal lytes. Patient reports her ICD shock occurred today after she ate lunch and was bringing her plate to the kitchen.  She had no prodromal symptoms and reported no LOC.  She states prior to today she was feeling fine and had no new complaints.  She reports she has had some nausea for the last 10 day, but states it has not been worse than baseline since discharge from LVAD implantation admission.  She reports that she has been feeling some jumping under her left chest for the last week or so; reporting it feels similar to that symptoms she was having with she had diaphragmatic stimulation of LV lead during previous hospital stay.   She was seen in clinic on 10/14 for her second post op visit and was doing well; working with PT twice a week and feeling fine.     At that visit she was started on Lisinopril but states she wasn't able to start taking it until two days ago.   She denies SOB worse than baseline, weight gain, LE edema, dizziness, lightheadiness. Reports she has had some tenderness at her DLES the last couple days.             * Ventricular tachycardia  -hx of VT in setting of  hypokalemia prior to LVAD placement.  She was on Amiodarone prior to LVAD, but currently not on it (A. Flutter with rates in 30s during last admission)  -Medtronic ICD interrogation ordered  -EP consultation prior to restarting Amiodarone; phrenic nerve stimulation also recurred per patient.   -Maintain K>4, Mg>2    LVAD (left ventricular assist device) present  -HeartMate 3 Implanted 9/10/19 as DT.  -Implanted by Dr. Walden  -INR slightly supratherapeutic. Goal INR 2.0-3.0. Coumadin dosing per inpatient RPh  -Antiplatelets:  mg.  -LDH is stable Will monitor daily.   -Speed set at 5200; 2D echo ordered for today  -Not listed for OHTx: was declined due to pulmonary hypertension and incomplete care giving plan  -DLES culture ordered as patient reports minimal amount of drainage and continued pain  -Patient interested in daily kit for drievline dressing change, discussed with VAD coordinator- will wait until acute issues (pain, erythema) resolved and then change her over in clinic    Procedure: Device Interrogation Including analysis of device parameters  Current Settings: Ventricular Assist Device  Review of device function is stable    TXP LVAD INTERROGATIONS 10/21/2019 10/21/2019 10/21/2019 10/14/2019 10/7/2019 10/1/2019 10/1/2019   Type HeartMate3 HeartMate3 HeartMate3 - - HeartMate3 HeartMate3   Flow 3.6 3.8 4.1 - - 4.0 4.2   Speed 5200 5200 5200 - - 5200 5200   PI 6.3 5.6 3.4 - - 3.0 3.1   Power (Orellana) 3.6 3.6 3.7 - - 3.8 3.7   LSL 4800 4800 4800 - - - -   Pulsatility No Pulse Intermittent pulse Intermittent pulse Intermittent pulse Pulse - -       ICD (implantable cardioverter-defibrillator) in place  -Medtronic ICD  -See above VT    Essential hypertension  -Patient is non-pulsatile  -Doppler goal 60-90 mmHg  -Blood pressure controlled over the last 24 hours  -Antihypertensive medications include Spironolactone, Lisinopril, HCTZ  -Will continue current regimen.        Pleural effusion  -Will get repeat  CXR while admitted for follow-up    Chronic systolic congestive heart failure  -NICM  -Last 2D Echo 9/25/19: with speed at 5200 LVEF 20%, LVEDD 5.3 cm  -Euvolemic on examination today  -Current diuretic regimen: Lasix 20mg QDaily, will change to QOD for now   -GDMT with home dose of Lisinopril and Aldactone  -2g Na dietary restriction, 1500 mL fluid restriction, strict I/Os      CKD (chronic kidney disease)  -Baseline creat 1.1-1.4 (was 1.5-1.8 during last admission)        Adry Cruz PA-C  Heart Transplant  [facility]

## 2019-10-21 NOTE — ASSESSMENT & PLAN NOTE
-hx of VT in setting of hypokalemia prior to LVAD placement.  She was on Amiodarone prior to LVAD, but currently not on it (A. Flutter with rates in 30s during last admission)  -Medtronic ICD interrogation ordered  -EP consultation prior to restarting Amiodarone; phrenic nerve stimulation also recurred per patient.   -Maintain K>4, Mg>2

## 2019-10-21 NOTE — SUBJECTIVE & OBJECTIVE
Past Medical History:   Diagnosis Date    Fractures     History of ventricular fibrillation 9/4/2019    History of ventricular tachycardia 9/4/2019    Hyperlipidemia     Migraine     Osteoarthritis        Past Surgical History:   Procedure Laterality Date    BACK SURGERY      2007    BONE GRAFT Left     from Left hip to Left FA    eardrum reconstruction  1980    ELBOW SURGERY Left [id number]    FOREARM FRACTURE SURGERY Bilateral [id number]    multiple surgeries    INSERTION OF GRAFT TO PERICARDIUM  9/10/2019    Procedure: INSERTION, GRAFT, PERICARDIUM;  Surgeon: Shar Walden MD;  Location: [facility] OR [address] [place] [address];  Service: Cardiovascular;;    INSERTION OF IMPLANTABLE CARDIOVERTER-DEFIBRILLATOR (ICD) GENERATOR WITH TWO EXISTING LEADS      INSERTION OF PACEMAKER Left     LEFT VENTRICULAR ASSIST DEVICE N/A 9/10/2019    Procedure: INSERTION-LEFT VENTRICULAR ASSIST DEVICE;  Surgeon: Shar Walden MD;  Location: [facility] OR [address] [place] [address];  Service: Cardiovascular;  Laterality: N/A;  DT HM3     LUMBAR FUSION  2007    L4-L5    RIGHT HEART CATHETERIZATION Right 9/4/2019    Procedure: INSERTION, CATHETER, RIGHT HEART;  Surgeon: Vi Bryant MD;  Location: [facility] CATH LAB;  Service: Cardiology;  Laterality: Right;    SINUS SURGERY Right 1994    with lymph nodes    STERNAL WOUND CLOSURE  9/10/2019    Procedure: CLOSURE, WOUND, STERNUM;  Surgeon: Shar Walden MD;  Location: [facility] OR [address] [place] [address];  Service: Cardiovascular;;    TEMPOROMANDIBULAR JOINT SURGERY Right 1988    TONSILLECTOMY      TREATMENT OF CARDIAC ARRHYTHMIA N/A 9/24/2019    Procedure: CARDIOVERSION;  Surgeon: Moe Barrera MD;  Location: [facility] EP LAB;  Service: Cardiology;  Laterality: N/A;  AF, DCCV/NADINE, anes, DM, Rm 3073    TYMPANOSTOMY TUBE PLACEMENT  1971- 1979    multiple tube placements       Review of patient's allergies indicates:   Allergen Reactions    Adhesive Blisters     Reaction to area in chest and up only    Codeine Itching       No  current facility-administered medications on file prior to encounter.      Current Outpatient Medications on File Prior to Encounter   Medication Sig    aspirin (ECOTRIN) 325 MG EC tablet Take 1 tablet (325 mg total) by mouth once daily.    furosemide (LASIX) 20 MG tablet Take 1 tablet (20 mg total) by mouth once daily.    gabapentin (NEURONTIN) 400 MG capsule Take 1 capsule (400 mg total) by mouth 2 (two) times daily.    lisinopril (PRINIVIL,ZESTRIL) 5 MG tablet Take 1 tablet (5 mg total) by mouth 2 (two) times daily.    mirtazapine (REMERON) 15 MG tablet Take 1 tablet (15 mg total) by mouth every evening.    oxyCODONE-acetaminophen (PERCOCET) 5-325 mg per tablet Take 1 tablet by mouth once daily.    pantoprazole (PROTONIX) 40 MG tablet Take 1 tablet (40 mg total) by mouth once daily.    senna-docusate 8.6-50 mg (PERICOLACE) 8.6-50 mg per tablet Take 2 tablets by mouth 2 (two) times daily as needed for Constipation.    spironolactone (ALDACTONE) 25 MG tablet Take 1 tablet (25 mg total) by mouth once daily.    VENLAFAXINE HCL (EFFEXOR ORAL) Take 150 mg by mouth once daily.    warfarin (COUMADIN) 3 MG tablet Take 1.5 tablets (4.5 mg total) by mouth Daily.     Family History     Problem Relation (Age of Onset)    Broken bones Maternal Grandmother    Cancer Paternal Grandmother    Dislocations Maternal Grandmother    Heart failure Paternal Grandfather, Maternal Grandfather    Migraines Mother, Maternal Grandmother, Paternal Grandmother, Paternal Grandfather, Maternal Grandfather    Obesity Paternal Grandmother    Osteoarthritis Mother, Maternal Grandmother, Paternal Grandmother, Paternal Grandfather, Maternal Grandfather, Father    Osteoporosis Maternal Grandmother    Scoliosis Maternal Grandmother    Stroke Father        Tobacco Use    Smoking status: Never Smoker    Smokeless tobacco: Never Used   Substance and Sexual Activity    Alcohol use: No    Drug use: No    Sexual activity: Not Currently      Review of Systems   Constitution: Negative for chills, fever and weight gain.   HENT: Negative for congestion.    Eyes: Negative for visual disturbance.   Cardiovascular: Negative for chest pain, claudication, dyspnea on exertion, leg swelling, orthopnea, palpitations and syncope.   Respiratory: Negative for cough, shortness of breath and snoring.    Hematologic/Lymphatic: Does not bruise/bleed easily.   Skin: Negative for rash.   Musculoskeletal: Negative for muscle cramps and myalgias.   Gastrointestinal: Negative for bloating, abdominal pain, constipation, diarrhea and melena.   Genitourinary: Negative for bladder incontinence.   Neurological: Negative for excessive daytime sleepiness, focal weakness and weakness.   Psychiatric/Behavioral: Negative for depression and suicidal ideas.     Objective:     Vital Signs (Most Recent):  Temp: 98.6 °F (37 °C) (10/21/19 0752)  Pulse: 92 (10/21/19 0752)  Resp: 18 (10/21/19 0752)  BP: (!) 88/0 (10/21/19 0752)  SpO2: 95 % (10/21/19 0752) Vital Signs (24h Range):  Temp:  [97.9 °F (36.6 °C)-98.6 °F (37 °C)] 98.6 °F (37 °C)  Pulse:  [71-99] 92  Resp:  [16-18] 18  SpO2:  [95 %-97 %] 95 %  BP: ()/(0-95) 88/0       Weight: 101.5 kg (223 lb 12.3 oz)  Body mass index is 35.05 kg/m².    SpO2: 95 %  O2 Device (Oxygen Therapy): room air    Physical Exam   Constitutional: She is oriented to person, place, and time. She appears well-developed and well-nourished.   HENT:   Head: Normocephalic and atraumatic.   Eyes: Pupils are equal, round, and reactive to light.   Neck: No JVD present.   Cardiovascular: Normal rate and regular rhythm.   No murmur heard.  Pulmonary/Chest: Effort normal and breath sounds normal. No respiratory distress.   Abdominal: Soft. Bowel sounds are normal. She exhibits no distension. There is no tenderness.   Musculoskeletal: She exhibits no edema.   Neurological: She is alert and oriented to person, place, and time.   Skin: Skin is warm and dry. No  erythema.   Psychiatric: Her behavior is normal. Judgment and thought content normal.   Vitals reviewed.      Significant Labs:   CMP:   Recent Labs   Lab 10/21/19  0049 10/21/19  0712    142   K 3.5 3.6    104   CO2 24 27    108   BUN 19 18   CREATININE 1.2 1.3   CALCIUM 9.2 9.6   PROT  --  7.2   ALBUMIN  --  3.4*   BILITOT  --  0.7   ALKPHOS  --  88   AST  --  21   ALT  --  17   ANIONGAP 10 11   ESTGFRAFRICA >60.0 55.3*   EGFRNONAA 52.8* 48.0*    and CBC:   Recent Labs   Lab 10/21/19  0712   WBC 5.09   HGB 11.0*   HCT 37.2          Significant Imaging: n/a

## 2019-10-21 NOTE — CONSULTS
"  [facility]  Adult Nutrition  Consult Note    SUMMARY     Recommendations    Recommendation:   1. Continue cardiac diet as tolerated.   2. If PO intake < 50%, recommend Optisource TID.   3. RD to monitor & follow up.    Goals: PO intake > 50% while admitted  Nutrition Goal Status: new  Communication of RD Recs: other (comment)(POC)    Reason for Assessment    Reason For Assessment: consult  Diagnosis: cardiac disease(ventricular tachycardia)  Relevant Medical History: NICM, HLD, s/p LVAD 9/2019  Interdisciplinary Rounds: attended  General Information Comments: Pt reports good appetite today since she was admitted late last night and missed dinner. Reports she has been eating 1 good meal/day PTA since her LVAD surgery. Pt reports feeling hungry for lunch and states it is easier to eat when food is brought to her, since she has been having issues with stamina and not having enough energy to prepare meals at home. Pt reports a 15# wt loss since discharged from her last admission, and states her UBW is 237#. NFPE completed today, pt appears nourished with no physical s/s of malnutrition at this time. Pt endorses following a low Na, consistent vitamin K diet PTA and does not have questions about dietary restrictions at this time.  Nutrition Discharge Planning: d/c on cardiac diet    Nutrition Risk Screen    Nutrition Risk Screen: no indicators present    Nutrition/Diet History    Spiritual, Cultural Beliefs, Jainism Practices, Values that Affect Care: no    Anthropometrics    Temp: 97.9 °F (36.6 °C)  Height: 5' 7" (170.2 cm)  Height (inches): 67 in  Weight Method: Standard Scale  Weight: 101.2 kg (223 lb)  Weight (lb): 223 lb  Ideal Body Weight (IBW), Female: 135 lb  % Ideal Body Weight, Female (lb): 165.19 lb  BMI (Calculated): 35    Lab/Procedures/Meds    Pertinent Labs Reviewed: reviewed  Pertinent Labs Comments: GFR 48, Alb 3.4, prealb 24, CRP 18.8  Pertinent Medications Reviewed: " reviewed  Pertinent Medications Comments: amiodarone, lasix, lisinopril, mirtazapine, pantoprazole, spironolactone, warfarin    Estimated/Assessed Needs    Weight Used For Calorie Calculations: 101.2 kg (223 lb 1.7 oz)  Energy Calorie Requirements (kcal): 2081 kcal/day  Energy Need Method: [place]-St Jeor(x 1.25 PAL)  Protein Requirements: 101-122 g/day(1-1.2 g/kg)  Weight Used For Protein Calculations: 101.2 kg (223 lb 1.7 oz)  Fluid Requirements (mL): per MD or 1 mL/kcal     RDA Method (mL): 2081    Nutrition Prescription Ordered    Current Diet Order: Cardiac    Evaluation of Received Nutrient/Fluid Intake    I/O: -120mL since admit  Comments: LBM 10/20  % Intake of Estimated Energy Needs: 75 - 100 %  % Meal Intake: 75 - 100 %    Nutrition Risk    Level of Risk/Frequency of Follow-up: low     Assessment and Plan    Nutrition Problem  Inadequate energy intake    Related to (etiology):   Decreased PO intake since LVAD implantation    Signs and Symptoms (as evidenced by):   Pt reports PO intake of 1 meal/day PTA     Interventions (treatment strategy):  Collaboration of care with other providers    Nutrition Diagnosis Status:   New     Monitor and Evaluation    Food and Nutrient Intake: energy intake, food and beverage intake  Food and Nutrient Adminstration: diet order  Anthropometric Measurements: weight, weight change, body mass index  Biochemical Data, Medical Tests and Procedures: electrolyte and renal panel, gastrointestinal profile, glucose/endocrine profile, inflammatory profile, lipid profile  Nutrition-Focused Physical Findings: overall appearance     Malnutrition Assessment  Orbital Region (Subcutaneous Fat Loss): well nourished  Upper Arm Region (Subcutaneous Fat Loss): well nourished  Thoracic and Lumbar Region: well nourished   [place] Region (Muscle Loss): well nourished  Clavicle Bone Region (Muscle Loss): well nourished  Dorsal Hand (Muscle Loss): well nourished  Patellar Region (Muscle Loss): well  nourished  Anterior Thigh Region (Muscle Loss): well nourished  Posterior Calf Region (Muscle Loss): well nourished     Nutrition Follow-Up    RD Follow-up?: Yes

## 2019-10-21 NOTE — ASSESSMENT & PLAN NOTE
-HeartMate 3 Implanted 9/10/19 as DT.  -Implanted by Dr. Walden  -INR pending. Goal INR 2.0-3.0. Will continue Coumadin for now.  Previous home dose: 3mg QDaiy and 1.5 mg on Fridays  -Antiplatelets:  mg.  -LDH is pending Will monitor daily.  -Speed set at 5200  -Not listed for OHTx: was declined due to pulmonary hypertension and incomplete care giving plan  -nursing staff to do dressing change tonight to evaluate etiology of tenderness    Procedure: Device Interrogation Including analysis of device parameters  Current Settings: Ventricular Assist Device  Review of device function is stable    TXP LVAD INTERROGATIONS 10/14/2019 10/7/2019 10/1/2019 10/1/2019 10/1/2019 10/1/2019 10/1/2019   Type - - HeartMate3 HeartMate3 HeartMate3 HeartMate3 HeartMate3   Flow - - 4.0 4.2 4.2 4.3 4.1   Speed - - 5200 5200 5200 5200 5200   PI - - 3.0 3.1 3.1 3.1 3.3   Power (Orellana) - - 3.8 3.7 3.7 3.5 3.9   LSL - - - - 4800 4800 4800   Pulsatility Intermittent pulse Pulse - - Intermittent pulse Intermittent pulse Intermittent pulse

## 2019-10-21 NOTE — PLAN OF CARE
Recommendations     Recommendation:   1. Continue cardiac diet as tolerated.   2. If PO intake < 50%, recommend Optisource TID.   3. RD to monitor & follow up.     Goals: PO intake > 50% while admitted  Nutrition Goal Status: new  Communication of RD Recs: other (comment)(POC)

## 2019-10-21 NOTE — ASSESSMENT & PLAN NOTE
Deborah Navas is a 50 y/o F with HFrEF (EF 20%) s/p HM3 implantation 9/10/19 (post up coarse uncomplicated with the exception of+ diaphragmatic stimulation of LV lead and pleural effusion with chest tube placement, atrial flutter with NADINE/DCCV), V-fib reported in setting of hypokalemia with appropriate shock from ICD on Amiodarone prior to LVAD placement, who was transferred from our [facility] for an ICD shock.   - dual chamber Medtronic ICD placed Feb 2017 by Dr. Bose at [facility] for primary prevention. Last shock: 10/20/19 for VT  - MMVT of NICM etiology; device attempted to fast-pace terminate rhythm and successfully shocking ~ 15:34 on 10/20/19. VT episode on 10/19/19 successfully was paced out of.  - no reversible etiologies noted (euvolemic, no active infection, electrolytes wnl)  - would restart PO amiodarone with PO load. Consider 400mg BID x2 weeks followed by 400mg daily  - may consider starting beta-blockers: metoprolol 25mg daily  - continue tele monitoring  - ensure K > 4, Mg >2

## 2019-10-21 NOTE — NURSING
Pt arrived on the unit via [place]. Pt is AAOx4, denies pain or discomfort. Calm and cooperative. See admission assess.

## 2019-10-21 NOTE — NURSING
DLES dressing change completed by RN using soap and water, sterile technique.  DLES is a 2, small amount of sanguinous drainage noted to dressing. Area under driveline is red and tender.  Next dressing change due 10/21/19.  Pt tolerated well.

## 2019-10-21 NOTE — PLAN OF CARE
Plan of care reviewed with patient. Pt remains free of falls or injuries. Denies questions or concerns at this time. TM

## 2019-10-21 NOTE — ASSESSMENT & PLAN NOTE
-HeartMate 3 Implanted 9/10/19 as DT.  -Implanted by Dr. Walden  -INR slightly supratherapeutic. Goal INR 2.0-3.0. Coumadin dosing per inpatient RPh  -Antiplatelets:  mg.  -LDH is stable Will monitor daily.   -Speed set at 5200; 2D echo ordered for today  -Not listed for OHTx: was declined due to pulmonary hypertension and incomplete care giving plan  -DLES culture ordered as patient reports minimal amount of drainage and continued pain  -Patient interested in daily kit for drievline dressing change, discussed with VAD coordinator- will wait until acute issues (pain, erythema) resolved and then change her over in clinic    Procedure: Device Interrogation Including analysis of device parameters  Current Settings: Ventricular Assist Device  Review of device function is stable    TXP LVAD INTERROGATIONS 10/21/2019 10/21/2019 10/21/2019 10/14/2019 10/7/2019 10/1/2019 10/1/2019   Type HeartMate3 HeartMate3 HeartMate3 - - HeartMate3 HeartMate3   Flow 3.6 3.8 4.1 - - 4.0 4.2   Speed 5200 5200 5200 - - 5200 5200   PI 6.3 5.6 3.4 - - 3.0 3.1   Power (Orellana) 3.6 3.6 3.7 - - 3.8 3.7   LSL 4800 4800 4800 - - - -   Pulsatility No Pulse Intermittent pulse Intermittent pulse Intermittent pulse Pulse - -

## 2019-10-21 NOTE — ASSESSMENT & PLAN NOTE
-hx of VT in setting of hypokalemia prior to LVAD placement.  She was on Amiodarone prior to LVAD, but currently not on it  -Will check electrolytes  -Medtronic ICD interrogation  -EP consultation prior to restarting Amiodarone

## 2019-10-21 NOTE — PT/OT/SLP EVAL
Occupational Therapy   Evaluation and Discharge Note    Name: Deborah Navas  MRN: [id number]  Admitting Diagnosis:  Ventricular tachycardia      Recommendations:     Discharge Recommendations: home with home health(resume HH PT)  Discharge Equipment Recommendations:  none  Barriers to discharge:  None    Assessment:     Deborah Navas is a 49 y.o. female with a medical diagnosis of Ventricular tachycardia. At this time, patient is functioning at their prior level of function and does not require further acute OT services.     Plan:     During this hospitalization, patient does not require further acute OT services.  Please re-consult if situation changes.    · Plan of Care Reviewed with: patient(caregiver )    Subjective     Chief Complaint: SOB  Patient/Family Comments/goals: to return home     Occupational Profile:  Living Environment: Pt lives in a 1st floor apartment with threshold to enter. Pt reports her very close friend has been assisting with dressing changed to driveline and PICC line. S/p HM3 implantation 9/10/19. Pt reports no recent falls and has not begun showering du to driveline site.   Previous level of function: PTA, pt was (I) with ADLs including LVAd management and functional mobility   Roles and Routines: home dweller   Equipment Used at home:  none  Assistance upon Discharge: Pt will have assistance from caregiver and family upon d/c.     Pain/Comfort:  · Pain Rating 1: 0/10  · Pain Rating Post-Intervention 1: 0/10    Patients cultural, spiritual, Judaism conflicts given the current situation: no    Objective:     Communicated with: RN prior to session.  Patient found HOB elevated with LVAD, telemetry upon OT entry to room. Pt agreeable to therapy session.     General Precautions: Standard, sternal, LVAD   Orthopedic Precautions:N/A   Braces: N/A     Occupational Performance:    Bed Mobility:    · Patient completed Supine to Sit with modified independence  · Patient  completed Sit to Supine with independence    Functional Mobility/Transfers:  · Patient completed Sit <> Stand Transfer with independence  with  no assistive device   · Functional Mobility: Pt engaging in functional mobility to simulate household/community distances approx 400ft  with independence using no AD  in order to maximize functional activity tolerance and standing balance required for engagement in occupations of choice.   · Emergency bag present  · Pt with no LOB but slight SOB; no seated or standing rest breaks requiring.     Activities of Daily Living:  · Upper Body Dressing: independence pt donned 2nd gown like robe and tied gown in frontal plane. Pt (I)'d donned holster vest in sitting   · Lower Body Dressing: independence pt donned B  socks while sitting EOB     Cognitive/Visual Perceptual:  Cognitive/Psychosocial Skills:     -       Oriented to: Person, Place, Time and Situation   -       Follows Commands/attention:Follows multistep  commands  -       Communication: clear/fluent  -       Memory: No Deficits noted  -       Safety awareness/insight to disability: intact   -       Mood/Affect/Coping skills/emotional control: Appropriate to situation  Visual/Perceptual:      -Intact      Physical Exam:  Balance:    - Static sit: (I)  -  Dynamic sit: (I)  - Static standing: (I)  - Dynamic Standing: (I)    Postural examination/scapula alignment:    -       No postural abnormalities identified  Skin integrity: Visible skin intact  Edema:  None noted  Sensation:    -       Intact  Dominant hand:    -       right  Upper Extremity Range of Motion:     -       Right Upper Extremity: WFL  -       Left Upper Extremity: WFL  Upper Extremity Strength:    -       Right Upper Extremity: WFL  -       Left Upper Extremity: WFL    AMPAC 6 Click ADL:  AMPAC Total Score: 24    Treatment & Education  - Pt educated on role of OT, POC, and d/c plan   - Pt performed transition from LVAD wall power to battery power with  independence; pt did required min verbal cues for ensuring power cable line was positioned infront of pt when wearing holster vest.   - Pt demonstrated slight weakness in R index finger. OT provided pt with yellow theraputty with handout for improved R index finger strength for FM tasks.    - Patient and family aware of patient's deficits and therapy progression.   - Educated pt on being appropriate to transfer with nsg and PCT with supervision for safety  - Importance of OOB ax's with staff member assistance and sitting OOB majority of day.   - Pt completed ADLs and functional mobility for treatment session as noted above   - Pt verbalized understanding. Pt expressed no further concerns/questions.  - whiteboard updated   Education:    Patient left HOB elevated with all lines intact, call button in reach, RN notified and pt's caregiver  present    GOALS:   Multidisciplinary Problems     Occupational Therapy Goals     Not on file          Multidisciplinary Problems (Resolved)        Problem: Occupational Therapy Goal    Goal Priority Disciplines Outcome Interventions   Occupational Therapy Goal   (Resolved)     OT, PT/OT Met                    History:     Past Medical History:   Diagnosis Date    Fractures     History of ventricular fibrillation 9/4/2019    History of ventricular tachycardia 9/4/2019    Hyperlipidemia     Migraine     Osteoarthritis        Past Surgical History:   Procedure Laterality Date    BACK SURGERY      2007    BONE GRAFT Left     from Left hip to Left FA    eardrum reconstruction  1980    ELBOW SURGERY Left [id number]    FOREARM FRACTURE SURGERY Bilateral [id number]    multiple surgeries    INSERTION OF GRAFT TO PERICARDIUM  9/10/2019    Procedure: INSERTION, GRAFT, PERICARDIUM;  Surgeon: Shar Walden MD;  Location: [facility] OR [address] [place] [address];  Service: Cardiovascular;;    INSERTION OF IMPLANTABLE CARDIOVERTER-DEFIBRILLATOR (ICD) GENERATOR WITH TWO EXISTING LEADS      INSERTION OF  PACEMAKER Left     LEFT VENTRICULAR ASSIST DEVICE N/A 9/10/2019    Procedure: INSERTION-LEFT VENTRICULAR ASSIST DEVICE;  Surgeon: Shar Walden MD;  [facility];  Service: Cardiovascular;  Laterality: N/A;  DT HM3     LUMBAR FUSION  2007    L4-L5    RIGHT HEART CATHETERIZATION Right 9/4/2019    Procedure: INSERTION, CATHETER, RIGHT HEART;  Surgeon: Vi Bryant MD;  Location: [facility] CATH LAB;  Service: Cardiology;  Laterality: Right;    SINUS SURGERY Right 1994    with lymph nodes    STERNAL WOUND CLOSURE  9/10/2019    Procedure: CLOSURE, WOUND, STERNUM;  Surgeon: Shar Walden MD;  [facility];  Service: Cardiovascular;;    TEMPOROMANDIBULAR JOINT SURGERY Right 1988    TONSILLECTOMY      TREATMENT OF CARDIAC ARRHYTHMIA N/A 9/24/2019    Procedure: CARDIOVERSION;  Surgeon: Moe Barrera MD;  Location: [facility] EP LAB;  Service: Cardiology;  Laterality: N/A;  AF, DCCV/NADINE, anes, DM, Rm 3073    TYMPANOSTOMY TUBE PLACEMENT  1971- 1979    multiple tube placements       Time Tracking:     OT Date of Treatment: 10/21/19  OT Start Time: 1320  OT Stop Time: 1343  OT Total Time (min): 23 min    Billable Minutes:Evaluation 23    Johanna Oliva, OT  10/21/2019

## 2019-10-21 NOTE — PLAN OF CARE
K+ 3.5. Replaced. EKG, echo and xray completed today. Hm3 soap and water daily dressing change. LVAD dressing change completed today. Some redness and dry drainage at the DLES. DLES cultures taken. LVAD interrogation performed. Pt ambulated in the hallway. Family at the bedside. Pt had vtach and was symptomatic. Amiodarone initiated and running per MD order. No complaints of pain, no SOB, no falls, and VSS.

## 2019-10-21 NOTE — PROGRESS NOTES
Cardiac device interrogation and/or reprogramming completed by industry representative. Please refer to report located in the Cards Procedure tab.

## 2019-10-22 VITALS
SYSTOLIC BLOOD PRESSURE: 88 MMHG | HEIGHT: 67 IN | TEMPERATURE: 98 F | OXYGEN SATURATION: 97 % | HEART RATE: 97 BPM | RESPIRATION RATE: 18 BRPM | BODY MASS INDEX: 35.64 KG/M2 | WEIGHT: 227.06 LBS

## 2019-10-22 LAB
ANION GAP SERPL CALC-SCNC: 9 MMOL/L (ref 8–16)
APTT BLDCRRT: 38.9 SEC (ref 21–32)
BASOPHILS # BLD AUTO: 0.06 K/UL (ref 0–0.2)
BASOPHILS NFR BLD: 1.3 % (ref 0–1.9)
BUN SERPL-MCNC: 22 MG/DL (ref 6–20)
CALCIUM SERPL-MCNC: 9.4 MG/DL (ref 8.7–10.5)
CHLORIDE SERPL-SCNC: 107 MMOL/L (ref 95–110)
CO2 SERPL-SCNC: 24 MMOL/L (ref 23–29)
CREAT SERPL-MCNC: 1.4 MG/DL (ref 0.5–1.4)
DIFFERENTIAL METHOD: ABNORMAL
EOSINOPHIL # BLD AUTO: 0.2 K/UL (ref 0–0.5)
EOSINOPHIL NFR BLD: 4.4 % (ref 0–8)
ERYTHROCYTE [DISTWIDTH] IN BLOOD BY AUTOMATED COUNT: 16.7 % (ref 11.5–14.5)
EST. GFR  (AFRICAN AMERICAN): 50.5 ML/MIN/1.73 M^2
EST. GFR  (NON AFRICAN AMERICAN): 43.8 ML/MIN/1.73 M^2
GLUCOSE SERPL-MCNC: 116 MG/DL (ref 70–110)
HCT VFR BLD AUTO: 36.9 % (ref 37–48.5)
HGB BLD-MCNC: 10.5 G/DL (ref 12–16)
IMM GRANULOCYTES # BLD AUTO: 0.02 K/UL (ref 0–0.04)
IMM GRANULOCYTES NFR BLD AUTO: 0.4 % (ref 0–0.5)
INR PPP: 3 (ref 0.8–1.2)
LDH SERPL L TO P-CCNC: 255 U/L (ref 110–260)
LYMPHOCYTES # BLD AUTO: 1 K/UL (ref 1–4.8)
LYMPHOCYTES NFR BLD: 21.8 % (ref 18–48)
MAGNESIUM SERPL-MCNC: 2.1 MG/DL (ref 1.6–2.6)
MCH RBC QN AUTO: 25.7 PG (ref 27–31)
MCHC RBC AUTO-ENTMCNC: 28.5 G/DL (ref 32–36)
MCV RBC AUTO: 90 FL (ref 82–98)
MONOCYTES # BLD AUTO: 0.3 K/UL (ref 0.3–1)
MONOCYTES NFR BLD: 5.9 % (ref 4–15)
NEUTROPHILS # BLD AUTO: 3.1 K/UL (ref 1.8–7.7)
NEUTROPHILS NFR BLD: 66.2 % (ref 38–73)
NRBC BLD-RTO: 0 /100 WBC
PHOSPHATE SERPL-MCNC: 4 MG/DL (ref 2.7–4.5)
PLATELET # BLD AUTO: 265 K/UL (ref 150–350)
PMV BLD AUTO: 10.1 FL (ref 9.2–12.9)
POTASSIUM SERPL-SCNC: 4.8 MMOL/L (ref 3.5–5.1)
PROTHROMBIN TIME: 29 SEC (ref 9–12.5)
RBC # BLD AUTO: 4.08 M/UL (ref 4–5.4)
SODIUM SERPL-SCNC: 140 MMOL/L (ref 136–145)
WBC # BLD AUTO: 4.73 K/UL (ref 3.9–12.7)

## 2019-10-22 PROCEDURE — 99238 HOSP IP/OBS DSCHRG MGMT 30/<: CPT | Mod: NTX,,, | Performed by: INTERNAL MEDICINE

## 2019-10-22 PROCEDURE — 25000003 PHARM REV CODE 250: Mod: NTX | Performed by: PHYSICIAN ASSISTANT

## 2019-10-22 PROCEDURE — 80048 BASIC METABOLIC PNL TOTAL CA: CPT | Mod: NTX

## 2019-10-22 PROCEDURE — 25000003 PHARM REV CODE 250: Mod: NTX | Performed by: INTERNAL MEDICINE

## 2019-10-22 PROCEDURE — 93750 INTERROGATION VAD IN PERSON: CPT | Mod: NTX,,, | Performed by: INTERNAL MEDICINE

## 2019-10-22 PROCEDURE — 83735 ASSAY OF MAGNESIUM: CPT | Mod: NTX

## 2019-10-22 PROCEDURE — 85610 PROTHROMBIN TIME: CPT | Mod: NTX

## 2019-10-22 PROCEDURE — 27000248 HC VAD-ADDITIONAL DAY: Mod: NTX

## 2019-10-22 PROCEDURE — 83615 LACTATE (LD) (LDH) ENZYME: CPT | Mod: NTX

## 2019-10-22 PROCEDURE — 85025 COMPLETE CBC W/AUTO DIFF WBC: CPT | Mod: NTX

## 2019-10-22 PROCEDURE — 85730 THROMBOPLASTIN TIME PARTIAL: CPT | Mod: NTX

## 2019-10-22 PROCEDURE — 36415 COLL VENOUS BLD VENIPUNCTURE: CPT | Mod: NTX

## 2019-10-22 PROCEDURE — 84100 ASSAY OF PHOSPHORUS: CPT | Mod: NTX

## 2019-10-22 PROCEDURE — 99238 PR HOSPITAL DISCHARGE DAY,<30 MIN: ICD-10-PCS | Mod: NTX,,, | Performed by: INTERNAL MEDICINE

## 2019-10-22 PROCEDURE — 93750 PR INTERROGATE VENT ASSIST DEV, IN PERSON, W PHYSICIAN ANALYSIS: ICD-10-PCS | Mod: NTX,,, | Performed by: INTERNAL MEDICINE

## 2019-10-22 PROCEDURE — 63600175 PHARM REV CODE 636 W HCPCS: Mod: NTX | Performed by: PHYSICIAN ASSISTANT

## 2019-10-22 RX ORDER — AMIODARONE HYDROCHLORIDE 200 MG/1
TABLET ORAL
Qty: 72 TABLET | Refills: 11 | Status: ON HOLD | OUTPATIENT
Start: 2019-10-22 | End: 2019-12-30 | Stop reason: HOSPADM

## 2019-10-22 RX ORDER — FUROSEMIDE 20 MG/1
20 TABLET ORAL EVERY OTHER DAY
Status: DISCONTINUED | OUTPATIENT
Start: 2019-10-23 | End: 2019-10-22 | Stop reason: HOSPADM

## 2019-10-22 RX ORDER — WARFARIN 3 MG/1
TABLET ORAL
Qty: 45 TABLET | Refills: 11 | Status: ON HOLD | OUTPATIENT
Start: 2019-10-22 | End: 2019-10-30 | Stop reason: SDUPTHER

## 2019-10-22 RX ORDER — FUROSEMIDE 20 MG/1
TABLET ORAL
Qty: 30 TABLET | Refills: 11 | Status: ON HOLD
Start: 2019-10-22 | End: 2019-10-30 | Stop reason: SDUPTHER

## 2019-10-22 RX ORDER — AMIODARONE HYDROCHLORIDE 200 MG/1
400 TABLET ORAL 2 TIMES DAILY
Status: DISCONTINUED | OUTPATIENT
Start: 2019-10-22 | End: 2019-10-22 | Stop reason: HOSPADM

## 2019-10-22 RX ADMIN — LISINOPRIL 5 MG: 5 TABLET ORAL at 08:10

## 2019-10-22 RX ADMIN — AMIODARONE HYDROCHLORIDE 400 MG: 200 TABLET ORAL at 08:10

## 2019-10-22 RX ADMIN — SPIRONOLACTONE 25 MG: 25 TABLET, FILM COATED ORAL at 08:10

## 2019-10-22 RX ADMIN — PANTOPRAZOLE SODIUM 40 MG: 40 TABLET, DELAYED RELEASE ORAL at 08:10

## 2019-10-22 RX ADMIN — AMIODARONE HYDROCHLORIDE 0.5 MG/MIN: 1.8 INJECTION, SOLUTION INTRAVENOUS at 07:10

## 2019-10-22 RX ADMIN — GABAPENTIN 400 MG: 400 CAPSULE ORAL at 08:10

## 2019-10-22 RX ADMIN — ASPIRIN 325 MG: 325 TABLET, COATED ORAL at 08:10

## 2019-10-22 RX ADMIN — VENLAFAXINE HYDROCHLORIDE 150 MG: 75 CAPSULE, EXTENDED RELEASE ORAL at 08:10

## 2019-10-22 RX ADMIN — AMIODARONE HYDROCHLORIDE 0.5 MG/MIN: 1.8 INJECTION, SOLUTION INTRAVENOUS at 12:10

## 2019-10-22 NOTE — ASSESSMENT & PLAN NOTE
-hx of VT in setting of hypokalemia prior to LVAD placement.  She was on Amiodarone prior to LVAD, but currently not on it (A. Flutter with rates in 30s during last admission)  -Medtronic ICD interrogation ordered  -EP consulted: appropriate ICD shock for MMVT. After ATP she had acceleration from ~340 to 220 ms followed by successful ICD shock. She also has PNS since her LVAD. Will add back amiodarone, change her to DDD (RV only) pacing configuration and extend AV delays.    -IV loaded with amio due to increasing ectopy, will complete PO load at discharge  -Maintain K>4, Mg>2

## 2019-10-22 NOTE — PROGRESS NOTES
Spoke with EP who referred me to HTS. HTS will order 1x zofran out of caution for QT interval. Per HTS team, hold NS fluids originally ordered for 1730 10/21/19.

## 2019-10-22 NOTE — PROGRESS NOTES
Patient reports nausea. No PRN meds ordered. EP paged. Awaiting return call. Will continue to monitor.

## 2019-10-22 NOTE — PLAN OF CARE
[facility]   Heart Transplant/VAD Clinic   [address] [place] [address]   [phone] after hours          [phone] fax     VAD HOME  HEALTH ORDERS    Admit to Home Health    Diagnosis:   Patient Active Problem List   Diagnosis    Knee pain    Pes anserine bursitis    ICD (implantable cardioverter-defibrillator) in place    Ventricular fibrillation    Congestive cardiomyopathy    History of ventricular fibrillation    History of ventricular tachycardia    Heart transplant candidate    Enlarged thyroid    Abnormal CT scan    CKD (chronic kidney disease)    Chronic systolic congestive heart failure    LVAD (left ventricular assist device) present    Pleural effusion    Anticoagulation monitoring, INR range 2-3    Long term (current) use of anticoagulants    Ventricular tachycardia    Essential hypertension     Patient is homebound due to:  NYHA Class IV HF. S/P LVAD placement.     Diet: Low Fat, Low cholesterol, 2Gm Na, Coumadin restrictions.    Acitivities: No Swimming, bathing, vacuuming, contact sports.    Fresh implants= Sternal Precautions    Nursing:   SN to complete comprehensive assessment including routine vital signs. Instruct on disease process and s/s of complications to report to MD. Review/verify medication list sent home with the patient at time of discharge  and instruct patient/caregiver as needed. Frequency may be adjusted depending on start of care date.    **LVAD driveline exit site dressing change is to be completed per LVAD patient/caregiver only**.    Notify MD if:  SBP > 120 or < 80;   MAP > 80 or < 65;   HR > 120 or < 60;   Temp > 101;   Weight gain >3lbs in 1 day or 5lbs in 1 week.    LABS:  SN to perform labs: PT/INR per Coumadin clinic [phone].   Follow up INR date: 10/24/19  No Finger Sticks    CONSULTS:    Physical Therapy to evaluate and treat. Evaluate for home safety and equipment needs;  Establish/upgrade home exercise program. Perform / instruct on therapeutic exercises, gait training, transfer training, and Range of Motion.    Occupational Therapy to evaluate and treat. Evaluate home environment for safety and equipment needs. Perform/Instruct on transfers, ADL training, ROM, and therapeutic exercises.    Send initial Home Health orders to HTS attending physician on call.  Send follow up questions to VAD clinic MD [phone] or [phone].

## 2019-10-22 NOTE — ASSESSMENT & PLAN NOTE
-HeartMate 3 Implanted 9/10/19 as DT.  -Implanted by Dr. Walden  -INR slightly supratherapeutic. Goal INR 2.0-3.0. Coumadin dosing per inpatient RPh  -Antiplatelets:  mg.  -LDH is stable Will monitor daily.   -Speed set at 5200; 2D echo performed inpatient, will have staff f/u images  -Not listed for OHTx: was declined due to pulmonary hypertension and incomplete care giving plan  -DLES culture ordered as patient reports minimal amount of drainage and continued pain, NGTD  -Patient interested in daily kit for ryanvline dressing change, discussed with VAD coordinator- will wait until acute issues (pain, erythema) resolved and then change her over in clinic    Procedure: Device Interrogation Including analysis of device parameters  Current Settings: Ventricular Assist Device  Review of device function is stable    TXP LVAD INTERROGATIONS 10/22/2019 10/22/2019 10/22/2019 10/21/2019 10/21/2019 10/21/2019 10/21/2019   Type HeartMate3 HeartMate3 HeartMate3 HeartMate3 HeartMate3 HeartMate3 HeartMate3   Flow 3.6 3.1 3.4 4.0 3.5 3.5 3.7   Speed 5200 5250 5250 5200 5250 5200 5200   PI 4.5 5.9 4.9 3.7 4.2 4.1 4.2   Power (Orellana) 3.5 3.5 3.5 3.6 3.7 3.6 3.6   LSL 4800 - - 4800 - - -   Pulsatility Pulse - - Intermittent pulse - - -

## 2019-10-22 NOTE — DISCHARGE SUMMARY
[facility]  Heart Transplant  Discharge Summary      Patient Name: Deborah Navas  MRN: [id number]  Admission Date: 10/20/2019  Hospital Length of Stay: 2 days  Discharge Date and Time: 10/22/2019   Attending Physician: Jolene Lua MD   Discharging Provider: Adry Cruz PA-C  Primary Care Provider: Primary Doctor Lisseth     HPI: 50 yo WF with NICM, s/p HM3 implantation 9/10/19 (post up coarse uncomplicated with the exception of+ diaphragmatic stimulation of LV lead and pleural effusion with chest tube placement, atrial flutter with NADINE/DCCV), V-fib reported in setting of hypokalemia with appropriate shock from ICD on Amiodarone prior to LVAD placement, HLP transferred from our [facility] for ICD shock.  Per report on transfer; she had 7 episodes of VT (6 terminated by overdrive pacing and 1 shock) in setting of normal lytes. Patient reports her ICD shock occurred today after she ate lunch and was bringing her plate to the kitchen.  She had no prodromal symptoms and reported no LOC.  She states prior to today she was feeling fine and had no new complaints.  She reports she has had some nausea for the last 10 day, but states it has not been worse than baseline since discharge from LVAD implantation admission.  She reports that she has been feeling some jumping under her left chest for the last week or so; reporting it feels similar to that symptoms she was having with she had diaphragmatic stimulation of LV lead during previous hospital stay.   She was seen in clinic on 10/14 for her second post op visit and was doing well; working with PT twice a week and feeling fine.     At that visit she was started on Lisinopril but states she wasn't able to start taking it until two days ago.   She denies SOB worse than baseline, weight gain, LE edema, dizziness, lightheadiness. Reports she has had some tenderness at her DLES the last couple days.             * No surgery found *  "    Hospital Course: EP was consulted who deemed her ICD shock appropriate for MMVT "After ATP she had acceleration from ~340 to 220 ms followed by successful ICD shock. She also has PNS since her LVAD. Will add back amiodarone, change her to DDD (RV only) pacing configuration and extend AV delays." Patient was IV loaded with amiodarone and transitioned to PO prior to discharge. Lasix was decreased from QD to EOD due to concerns for hypovolemia. Her driveline was cultured due to pain and minimal drainage, and had NGTD. The patient was interested in changing to daily kits, but we discussed that we would transition her when her driveline pain improved.        Consults (From admission, onward)        Status Ordering Provider     Inpatient consult to Electrophysiology  Once     Provider:  (Not yet assigned)    Completed ANIVAL ZHU     Inpatient consult to Registered Dietitian/Nutritionist  Once     Provider:  (Not yet assigned)    Completed ANIVAL ZHU          Significant Diagnostic Studies:     Pending Diagnostic Studies:     None        Final Active Diagnoses:    Diagnosis Date Noted POA    PRINCIPAL PROBLEM:  Ventricular tachycardia [I47.2] 10/20/2019 Yes    LVAD (left ventricular assist device) present [Z95.811] 09/10/2019 Not Applicable    ICD (implantable cardioverter-defibrillator) in place [Z95.810] 07/20/2018 Yes    Essential hypertension [I10] 10/21/2019 Yes    Pleural effusion [J90] 09/16/2019 Yes    Chronic systolic congestive heart failure [I50.22]  Yes    CKD (chronic kidney disease) [N18.9] 09/07/2019 Yes      Problems Resolved During this Admission:      Discharged Condition: stable    Disposition: Home-Health Care Svc    Follow Up: as scheduled in VAD clinic    Patient Instructions:      Diet Cardiac     Notify your health care provider if you experience any of the following:  temperature >100.4     Notify your health care provider if you experience any of the following:  persistent " nausea and vomiting or diarrhea     Notify your health care provider if you experience any of the following:  severe uncontrolled pain     Notify your health care provider if you experience any of the following:  redness, tenderness, or signs of infection (pain, swelling, redness, odor or green/yellow discharge around incision site)     Notify your health care provider if you experience any of the following:  difficulty breathing or increased cough     Notify your health care provider if you experience any of the following:  severe persistent headache     Notify your health care provider if you experience any of the following:  worsening rash     Notify your health care provider if you experience any of the following:  persistent dizziness, light-headedness, or visual disturbances     Notify your health care provider if you experience any of the following:  increased confusion or weakness     Activity as tolerated     Medications:  Reconciled Home Medications:      Medication List      ASK your doctor about these medications    aspirin 325 MG EC tablet  Commonly known as:  ECOTRIN  Take 1 tablet (325 mg total) by mouth once daily.     EFFEXOR ORAL  Take 150 mg by mouth once daily.     furosemide 20 MG tablet  Commonly known as:  LASIX  Take 1 tablet (20 mg total) by mouth once daily.     gabapentin 400 MG capsule  Commonly known as:  NEURONTIN  Take 1 capsule (400 mg total) by mouth 2 (two) times daily.     lisinopril 5 MG tablet  Commonly known as:  PRINIVIL,ZESTRIL  Take 1 tablet (5 mg total) by mouth 2 (two) times daily.     mirtazapine 15 MG tablet  Commonly known as:  REMERON  Take 1 tablet (15 mg total) by mouth every evening.     oxyCODONE-acetaminophen 5-325 mg per tablet  Commonly known as:  PERCOCET  Take 1 tablet by mouth once daily.     pantoprazole 40 MG tablet  Commonly known as:  PROTONIX  Take 1 tablet (40 mg total) by mouth once daily.     senna-docusate 8.6-50 mg 8.6-50 mg per tablet  Commonly  known as:  PERICOLACE  Take 2 tablets by mouth 2 (two) times daily as needed for Constipation.     spironolactone 25 MG tablet  Commonly known as:  ALDACTONE  Take 1 tablet (25 mg total) by mouth once daily.     warfarin 3 MG tablet  Commonly known as:  COUMADIN  Take 1.5 tablets (4.5 mg total) by mouth Daily.            Adry Cruz PA-C  Heart Transplant  [facility]

## 2019-10-22 NOTE — PROGRESS NOTES
[facility]  Heart Transplant  Progress Note    Patient Name: Deborah Navas  MRN: [id number]  Admission Date: 10/20/2019  Hospital Length of Stay: 2 days  Attending Physician: Jolene Lua MD  Primary Care Provider: Primary Doctor No  Principal Problem:Ventricular tachycardia    Subjective:     Interval History: Sister at bedside. Had a good night. They are hoping to go home today as her sister has to work the next two days. Having pain at IV site and swelling.     Continuous Infusions:  Scheduled Meds:   amiodarone  400 mg Oral BID    aspirin  325 mg Oral Daily    [START ON 10/23/2019] furosemide  20 mg Oral Every other day    gabapentin  400 mg Oral BID    lisinopril  5 mg Oral BID    mirtazapine  15 mg Oral QHS    pantoprazole  40 mg Oral Daily    spironolactone  25 mg Oral Daily    venlafaxine  150 mg Oral Daily    warfarin  1.5 mg Oral Daily     PRN Meds:senna-docusate 8.6-50 mg    Review of patient's allergies indicates:   Allergen Reactions    Adhesive Blisters     Reaction to area in chest and up only    Codeine Itching     Objective:     Vital Signs (Most Recent):  Temp: 97.7 °F (36.5 °C) (10/22/19 0800)  Pulse: 65 (10/22/19 0800)  Resp: 16 (10/22/19 0800)  BP: (!) 88/0 (10/22/19 0800)  SpO2: (!) 94 % (10/22/19 0800) Vital Signs (24h Range):  Temp:  [96.5 °F (35.8 °C)-98.9 °F (37.2 °C)] 97.7 °F (36.5 °C)  Pulse:  [] 65  Resp:  [16-19] 16  SpO2:  [94 %-99 %] 94 %  BP: (76-90)/(0) 88/0     Patient Vitals for the past 72 hrs (Last 3 readings):   Weight   10/22/19 0715 103 kg (227 lb 1.2 oz)   10/21/19 0752 101.2 kg (223 lb)   10/21/19 0500 101.5 kg (223 lb 12.3 oz)     Body mass index is 35.56 kg/m².      Intake/Output Summary (Last 24 hours) at 10/22/2019 1000  Last data filed at 10/22/2019 0500  Gross per 24 hour   Intake 540 ml   Output 1000 ml   Net -460 ml       Hemodynamic Parameters:       Telemetry: frequent PVCs    Physical Exam   Constitutional: She is  oriented to person, place, and time. She appears well-developed and well-nourished.   HENT:   Head: Normocephalic and atraumatic.   Eyes: Pupils are equal, round, and reactive to light. EOM are normal.   Neck: Normal range of motion. Neck supple. No JVD present.   Cardiovascular: Normal rate and regular rhythm.   VAD hum smooth, nonpulsatile   Pulmonary/Chest: Effort normal and breath sounds normal. No stridor. No respiratory distress.   Abdominal: Soft. Bowel sounds are normal. She exhibits no distension. There is no tenderness.   Musculoskeletal: Normal range of motion. She exhibits edema (at IV site, tender to palpation and slightly indurated).   Neurological: She is alert and oriented to person, place, and time.   Skin: Skin is warm and dry. Capillary refill takes 2 to 3 seconds.   Psychiatric: She has a normal mood and affect. Her behavior is normal. Judgment and thought content normal.   Nursing note and vitals reviewed.      Significant Labs:  CBC:  Recent Labs   Lab 10/21/19  0712 10/22/19  0408   WBC 5.09 4.73   RBC 4.27 4.08   HGB 11.0* 10.5*   HCT 37.2 36.9*    265   MCV 87 90   MCH 25.8* 25.7*   MCHC 29.6* 28.5*     BNP:  Recent Labs   Lab 10/21/19  0049 10/21/19  0712   * 276*     CMP:  Recent Labs   Lab 10/21/19  0049 10/21/19  0712 10/21/19  1531 10/22/19  0408    108  --  116*   CALCIUM 9.2 9.6  --  9.4   ALBUMIN  --  3.4*  --   --    PROT  --  7.2  --   --     142  --  140   K 3.5 3.6 3.8 4.8   CO2 24 27  --  24    104  --  107   BUN 19 18  --  22*   CREATININE 1.2 1.3  --  1.4   ALKPHOS  --  88  --   --    ALT  --  17  --   --    AST  --  21  --   --    BILITOT  --  0.7  --   --       Coagulation:   Recent Labs   Lab 10/21/19  0049 10/21/19  0712 10/22/19  0408   INR 3.2* 3.1* 3.0*   APTT  --  37.5* 38.9*     LDH:  Recent Labs   Lab 10/21/19  0049 10/21/19  0712 10/22/19  0408    255 255     Microbiology:  Microbiology Results (last 7 days)     Procedure  Component Value Units Date/Time    Culture, Anaerobe [[id number]] Collected:  10/21/19 1111    Order Status:  Completed Specimen:  Skin from Abdomen Updated:  10/22/19 0703     Anaerobic Culture Culture in progress    Narrative:       DLPRO    Aerobic culture [[id number]] Collected:  10/21/19 1111    Order Status:  Completed Specimen:  Skin from Abdomen Updated:  10/22/19 0659     Aerobic Bacterial Culture No growth    Narrative:       DLES          I have reviewed all pertinent labs within the past 24 hours.    Estimated Creatinine Clearance: 59.3 mL/min (based on SCr of 1.4 mg/dL).    Diagnostic Results:  I have reviewed and interpreted all pertinent imaging results/findings within the past 24 hours.    Assessment and Plan:     50 yo WF with NICM, s/p HM3 implantation 9/10/19 (post up coarse uncomplicated with the exception of+ diaphragmatic stimulation of LV lead and pleural effusion with chest tube placement, atrial flutter with NADINE/DCCV), V-fib reported in setting of hypokalemia with appropriate shock from ICD on Amiodarone prior to LVAD placement, H[facility] for ICD shock.  Per report on transfer; she had 7 episodes of VT (6 terminated by overdrive pacing and 1 shock) in setting of normal lytes. Patient reports her ICD shock occurred today after she ate lunch and was bringing her plate to the kitchen.  She had no prodromal symptoms and reported no LOC.  She states prior to today she was feeling fine and had no new complaints.  She reports she has had some nausea for the last 10 day, but states it has not been worse than baseline since discharge from LVAD implantation admission.  She reports that she has been feeling some jumping under her left chest for the last week or so; reporting it feels similar to that symptoms she was having with she had diaphragmatic stimulation of LV lead during previous hospital stay.   She was seen in clinic on 10/14 for her second post op visit and was  doing well; working with PT twice a week and feeling fine.     At that visit she was started on Lisinopril but states she wasn't able to start taking it until two days ago.   She denies SOB worse than baseline, weight gain, LE edema, dizziness, lightheadiness. Reports she has had some tenderness at her DLES the last couple days.             * Ventricular tachycardia  -hx of VT in setting of hypokalemia prior to LVAD placement.  She was on Amiodarone prior to LVAD, but currently not on it (A. Flutter with rates in 30s during last admission)  -Medtronic ICD interrogation ordered  -EP consulted: appropriate ICD shock for MMVT. After ATP she had acceleration from ~340 to 220 ms followed by successful ICD shock. She also has PNS since her LVAD. Will add back amiodarone, change her to DDD (RV only) pacing configuration and extend AV delays.    -IV loaded with amio due to increasing ectopy, will complete PO load at discharge  -Maintain K>4, Mg>2    LVAD (left ventricular assist device) present  -HeartMate 3 Implanted 9/10/19 as DT.  -Implanted by Dr. Walden  -INR slightly supratherapeutic. Goal INR 2.0-3.0. Coumadin dosing per inpatient RPh  -Antiplatelets:  mg.  -LDH is stable Will monitor daily.   -Speed set at 5200; 2D echo performed inpatient, will have staff f/u images  -Not listed for OHTx: was declined due to pulmonary hypertension and incomplete care giving plan  -DLES culture ordered as patient reports minimal amount of drainage and continued pain, NGTD  -Patient interested in daily kit for drievline dressing change, discussed with VAD coordinator- will wait until acute issues (pain, erythema) resolved and then change her over in clinic    Procedure: Device Interrogation Including analysis of device parameters  Current Settings: Ventricular Assist Device  Review of device function is stable    TXP LVAD INTERROGATIONS 10/22/2019 10/22/2019 10/22/2019 10/21/2019 10/21/2019 10/21/2019 10/21/2019   Type  HeartMate3 HeartMate3 HeartMate3 HeartMate3 HeartMate3 HeartMate3 HeartMate3   Flow 3.6 3.1 3.4 4.0 3.5 3.5 3.7   Speed 5200 5250 5250 5200 5250 5200 5200   PI 4.5 5.9 4.9 3.7 4.2 4.1 4.2   Power ([place]) 3.5 3.5 3.5 3.6 3.7 3.6 3.6   LSL 4800 - - 4800 - - -   Pulsatility Pulse - - Intermittent pulse - - -       ICD (implantable cardioverter-defibrillator) in place  -Medtronic ICD  -See above VT    Essential hypertension  -Patient is non-pulsatile  -Doppler goal 60-90 mmHg  -Blood pressure controlled over the last 24 hours  -Antihypertensive medications include Spironolactone, Lisinopril, HCTZ  -Will continue current regimen.        Pleural effusion  -CXR appears stable compared to most recent one 10/14    Chronic systolic congestive heart failure  -NICM  -Last 2D Echo 9/25/19: with speed at 5200 LVEF 20%, LVEDD 5.3 cm  -Euvolemic on examination today  -Current diuretic regimen: Lasix 20mg QDaily, will change to QOD for now   -GDMT with home dose of Lisinopril and Aldactone  -2g Na dietary restriction, 1500 mL fluid restriction, strict I/Os      CKD (chronic kidney disease)  -Baseline creat 1.1-1.4 (was 1.5-1.8 during last admission)      dAry Cruz PA-C  Heart Transplant  [facility]

## 2019-10-22 NOTE — SUBJECTIVE & OBJECTIVE
Interval History: Sister at bedside. Had a good night. They are hoping to go home today as her sister has to work the next two days. Having pain at IV site and swelling.     Continuous Infusions:  Scheduled Meds:   amiodarone  400 mg Oral BID    aspirin  325 mg Oral Daily    [START ON 10/23/2019] furosemide  20 mg Oral Every other day    gabapentin  400 mg Oral BID    lisinopril  5 mg Oral BID    mirtazapine  15 mg Oral QHS    pantoprazole  40 mg Oral Daily    spironolactone  25 mg Oral Daily    venlafaxine  150 mg Oral Daily    warfarin  1.5 mg Oral Daily     PRN Meds:senna-docusate 8.6-50 mg    Review of patient's allergies indicates:   Allergen Reactions    Adhesive Blisters     Reaction to area in chest and up only    Codeine Itching     Objective:     Vital Signs (Most Recent):  Temp: 97.7 °F (36.5 °C) (10/22/19 0800)  Pulse: 65 (10/22/19 0800)  Resp: 16 (10/22/19 0800)  BP: (!) 88/0 (10/22/19 0800)  SpO2: (!) 94 % (10/22/19 0800) Vital Signs (24h Range):  Temp:  [96.5 °F (35.8 °C)-98.9 °F (37.2 °C)] 97.7 °F (36.5 °C)  Pulse:  [] 65  Resp:  [16-19] 16  SpO2:  [94 %-99 %] 94 %  BP: (76-90)/(0) 88/0     Patient Vitals for the past 72 hrs (Last 3 readings):   Weight   10/22/19 0715 103 kg (227 lb 1.2 oz)   10/21/19 0752 101.2 kg (223 lb)   10/21/19 0500 101.5 kg (223 lb 12.3 oz)     Body mass index is 35.56 kg/m².      Intake/Output Summary (Last 24 hours) at 10/22/2019 1000  Last data filed at 10/22/2019 0500  Gross per 24 hour   Intake 540 ml   Output 1000 ml   Net -460 ml       Hemodynamic Parameters:       Telemetry: frequent PVCs    Physical Exam   Constitutional: She is oriented to person, place, and time. She appears well-developed and well-nourished.   HENT:   Head: Normocephalic and atraumatic.   Eyes: Pupils are equal, round, and reactive to light. EOM are normal.   Neck: Normal range of motion. Neck supple. No JVD present.   Cardiovascular: Normal rate and regular rhythm.   VAD hum  smooth, nonpulsatile   Pulmonary/Chest: Effort normal and breath sounds normal. No stridor. No respiratory distress.   Abdominal: Soft. Bowel sounds are normal. She exhibits no distension. There is no tenderness.   Musculoskeletal: Normal range of motion. She exhibits edema (at IV site, tender to palpation and slightly indurated).   Neurological: She is alert and oriented to person, place, and time.   Skin: Skin is warm and dry. Capillary refill takes 2 to 3 seconds.   Psychiatric: She has a normal mood and affect. Her behavior is normal. Judgment and thought content normal.   Nursing note and vitals reviewed.      Significant Labs:  CBC:  Recent Labs   Lab 10/21/19  0712 10/22/19  0408   WBC 5.09 4.73   RBC 4.27 4.08   HGB 11.0* 10.5*   HCT 37.2 36.9*    265   MCV 87 90   MCH 25.8* 25.7*   MCHC 29.6* 28.5*     BNP:  Recent Labs   Lab 10/21/19  0049 10/21/19  0712   * 276*     CMP:  Recent Labs   Lab 10/21/19  0049 10/21/19  0712 10/21/19  1531 10/22/19  0408    108  --  116*   CALCIUM 9.2 9.6  --  9.4   ALBUMIN  --  3.4*  --   --    PROT  --  7.2  --   --     142  --  140   K 3.5 3.6 3.8 4.8   CO2 24 27  --  24    104  --  107   BUN 19 18  --  22*   CREATININE 1.2 1.3  --  1.4   ALKPHOS  --  88  --   --    ALT  --  17  --   --    AST  --  21  --   --    BILITOT  --  0.7  --   --       Coagulation:   Recent Labs   Lab 10/21/19  0049 10/21/19  0712 10/22/19  0408   INR 3.2* 3.1* 3.0*   APTT  --  37.5* 38.9*     LDH:  Recent Labs   Lab 10/21/19  0049 10/21/19  0712 10/22/19  0408    255 255     Microbiology:  Microbiology Results (last 7 days)     Procedure Component Value Units Date/Time    Culture, Anaerobe [[id number]] Collected:  10/21/19 1111    Order Status:  Completed Specimen:  Skin from Abdomen Updated:  10/22/19 0703     Anaerobic Culture Culture in progress    Narrative:       DLES    Aerobic culture [[id number]] Collected:  10/21/19 1111    Order Status:  Completed  Specimen:  Skin from Abdomen Updated:  10/22/19 0659     Aerobic Bacterial Culture No growth    Narrative:       DLES          I have reviewed all pertinent labs within the past 24 hours.    Estimated Creatinine Clearance: 59.3 mL/min (based on SCr of 1.4 mg/dL).    Diagnostic Results:  I have reviewed and interpreted all pertinent imaging results/findings within the past 24 hours.

## 2019-10-22 NOTE — PROGRESS NOTES
DISCHARGE NOTE:    Deborah Navas is a 49 y.o. female s/p HM3 from 9/19 admitted for evaluation of VT at OSH.     Past Medical History:   Diagnosis Date    Fractures     History of ventricular fibrillation 9/4/2019    History of ventricular tachycardia 9/4/2019    Hyperlipidemia     Migraine     Osteoarthritis        Hospital Course: During her hospital stay she was started on a 2 week load of amiodarone 400mg BID followed by daily. Her warfarin dose was decreased to 1.5mg orally daily MWF/3mg on other days.     Allergies:   Review of patient's allergies indicates:   Allergen Reactions    Adhesive Blisters     Reaction to area in chest and up only    Codeine Itching       Patient Pharmacy: [facility]     Pharmacy Interventions/Recommendations:  1) INR Goal: 2-3    2) Antiplatelet Agents: ASA 325mg     3) Heparin Bridging:  UFH     4) Patient Counseling/Education:  completed    5) INR Follow-Up/Discharge Needs:  Thursday with coumadin clinic     See list of discharge medication for dosing instructions.     Deborah Navas and her caregiver verbalized their understanding and had the opportunity to ask questions.      Discharge Medications:   Deborah Navas   Home Medication Instructions MARLEY:[id number]    Printed on:10/22/19 1792   Medication Information                      amiodarone (PACERONE) 400 MG tablet  Take 400mg orally twice daily for 14 days then take 400mg orally daily             aspirin (ECOTRIN) 325 MG EC tablet  Take 1 tablet (325 mg total) by mouth once daily.             furosemide (LASIX) 20 MG tablet  Take 20mg orally every other day starting 10.23             gabapentin (NEURONTIN) 400 MG capsule  Take 1 capsule (400 mg total) by mouth 2 (two) times daily.             lisinopril (PRINIVIL,ZESTRIL) 5 MG tablet  Take 1 tablet (5 mg total) by mouth 2 (two) times daily.             mirtazapine (REMERON) 15 MG tablet  Take 1 tablet (15 mg total) by mouth  every evening.             oxyCODONE-acetaminophen (PERCOCET) 5-325 mg per tablet  Take 1 tablet by mouth once daily.             pantoprazole (PROTONIX) 40 MG tablet  Take 1 tablet (40 mg total) by mouth once daily.             senna-docusate 8.6-50 mg (PERICOLACE) 8.6-50 mg per tablet  Take 2 tablets by mouth 2 (two) times daily as needed for Constipation.             spironolactone (ALDACTONE) 25 MG tablet  Take 1 tablet (25 mg total) by mouth once daily.             VENLAFAXINE HCL (EFFEXOR ORAL)  Take 150 mg by mouth once daily.             warfarin (COUMADIN) 3 MG tablet  Take 1.5mg orally daily every Mon/Wed/Fri and 3mg orally on all other days

## 2019-10-22 NOTE — HOSPITAL COURSE
"EP was consulted who deemed her ICD shock appropriate for MMVT "After ATP she had acceleration from ~340 to 220 ms followed by successful ICD shock. She also has PNS since her LVAD. Will add back amiodarone, change her to DDD (RV only) pacing configuration and extend AV delays." Patient was IV loaded with amiodarone and transitioned to PO prior to discharge. Lasix was decreased from QD to EOD due to concerns for hypovolemia. Her driveline was cultured due to pain and minimal drainage, and had NGTD. The patient was interested in changing to daily kits, but we discussed that we would transition her when her driveline pain improved.       "

## 2019-10-22 NOTE — PROGRESS NOTES
Discharge    Pt presents in room with caregiver Flavia. Pt aaox4 with pleasant affect. Pt states in agreement with plan to discharge to home today with Ev[facility]  ph: [phone]; fax: [phone]. Faxed demographic sheet, orders, H&P, progress notes and MAR and confirmed received. Pt states Flavia will transport pt home. Pt reports coping well and denies further needs, questions, concerns at this time and none indicated. Providing psychosocial and counseling support, education, resources, assistance and discharge planning as indicated. SW remains available.

## 2019-10-22 NOTE — PROGRESS NOTES
10/22/2019  Christo Cooper    Current provider:  Jolene Lua MD      I, Christo Cooper, rounded on Deborah Navas to ensure all mechanical assist device settings (IABP or VAD) were appropriate and all parameters were within limits.  I was able to ensure all back up equipment was present, the staff had no issues, and the Perfusion Department daily rounding was complete.    7:27 AM

## 2019-10-22 NOTE — PROGRESS NOTES
Admit Note     Met with pt and her friend to assess needs on 10/21/19.    Patient is a 49 y.o. single female, admitted for ventricular tachycardia, ICD, CKD and LVAD.  Pt received her LVAD on 9/10/19. The pt's friend Flavia does the dressing changes.       Patient admitted to [facility] on 10/20/2019 .  At this time, patient presents as alert and oriented x 4, good eye contact, calm and communicative.  At this time, patients caregiver presents as alert and oriented x 4 and calm.    Household/Family Systems      Currently the patient resides with her friend Flavia Nuñez at:    [address] [place] [address]    Pt's home address is :   [address] [place] [address] [address]  [place] [address].    When pt is in her own home she lives alone.       Support system includes mother, sister and best friend.    Patient does not have dependents that are need of being cared for.   The pt does not have any children.     Patients primary caregiver is self with assistance from Flavia.     Pt's cell: [phone]    Emergency contacts:  Cristal Navas (mother, lives in [place], [place], five hours from [place], works part time,drives and is one of the pt's HCPA) [phone]  hSaronda Kitchen (sister, lives in [place], HCPA) [phone]  Flavia Nuñez (best friend, lives in [place], works full time and drives, HCPA) [phone]    During admission, patient's caregiver plans to stay in patient's room.  Confirmed patient and patients caregivers do have access to reliable transportation.  Pt is currently not driving due to medical issues.  Pt receives transportation assistance from friends, family and [employer].    Cognitive Status/Learning     Patient reports reading ability as college and states patient does not have difficulty with reading, writing, seeing, hearing, learning and memory. Pt uses reading glasses.   Pt reports her memory is improving since LVAD.  Pt reports continued support needed with comprehending new information.    Patient  reports patient learns best by visual.      Needed: No.   Highest education level: Attended College/Technical School    Vocation/Disability   .  Working for Income: No, pt is on leave from work.  If no, reason not working: Disability.  Pt is receiving short term disability from work. Pt reports short term disability will end when she is released from [place] to return to work.   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     Adherence     Patient reports a high level of adherence to patients health care regimen.  Adherence counseling and education provided. Patient verbalizes understanding.    Substance Use    Patient reports the following substance usage.    Tobacco: none, patient denies any use.  Alcohol: none, patient denies any use.  Illicit Drugs/Non-prescribed Medications: none, patient denies any use.  Patient states clear understanding of the potential impact of substance use.  Substance abstinence/cessation counseling, education and resources provided and reviewed.     Services Utilizing/ADLS    Infusion Service: Prior to admission, patient utilizing? no  Home Health: Prior to admission, patient utilizing? yes, [facility] [phone], fax [phone]. Pt reports HH nurse see's pt once a week and physical therapy see's pt twice a week.  Pt reports she is having Rt handed neuropathy and would like HH to be resumed when discharged with the addition of OT services.   DME: Prior to admission, no  Pulmonary/Cardiac Rehab: Prior to admission, no, pt participated in cardiac rehab in the past.  Program ended May 2019  Dialysis:  Prior to admission, no  Transplant Specialty Pharmacy:  Prior to admission, no.    Prior to admission, patient reports patient was mostly  independent with ADLS and was not driving. Pt reports difficulty using her right hand and needs support with dressing and preparing food.    Patient reports patient is not able to care for self at this time due to  compromised medical condition (as documented in medical record) and physical weakness..  Patient indicates a willingness to care for self once medically cleared to do so.    Insurance/Medications    Insured by   Payor/Plan Subscr  Sex Relation Sub. Ins. ID Effective Group Num   1. BLUE CROSS BL* YON VILLARREAL* 1970 Female  [id number]* [id number]                                   [address] [address]      Primary Insurance (for UNOS reporting): Private Insurance  Secondary Insurance (for UNOS reporting): None    Patient reports patient is able to obtain and afford medications at this time and at time of discharge.    Living Will/Healthcare Power of     Patient states patient has a LW and/or HCPA. Pt's mother, sister and friend, Flavia are the HCPA.     provided education regarding LW and HCPA and the completion of forms.    Coping/Mental Health    Patient is coping adequately with the aid of  family members and friends.   Patient indicates mental health difficulties.   Pt reports difficulty with depression due to medical needs. Pt reports  Effexor is managing her depression along with support from friends and family.   Pt reports she does have some difficulty with sleeping and she is taking Remeron to manage same.  Pt reports is necessary she can also take Ambien.   Pt reports she has a good support system and does not feel out pt counseling is indicated at this time.    Worker provided emotional support and encouragement.     Discharge Planning    At time of discharge, patient plans to return to Flavia's home under the care of self and Flavia.  Patients friend or family will transport patient.  Per rounds today, expected discharge date has not been medically determined at this time. Patient and patients caregiver  verbalize understanding and are involved in treatment planning and discharge process.    Additional Concerns    Patient is being followed for needs, education, resources,  information, emotional support, supportive counseling, and for supportive and skilled discharge plan of care.  providing ongoing psychosocial support, education, resources and d/c planning as needed.  SW remains available. Patient's caregiver verbalizes understanding and agreement with information reviewed,  availability and how to access available resources as needed. Patient verbalizes understanding and agreement with information reviewed, social work availability, and how to access available resources as needed.

## 2019-10-22 NOTE — NURSING
Pt discharged per MD orders.  Tele discontinued and returned to station.  IV discontinued; catheter tip intact 1.  Medication list and prescriptions reviewed; prescriptions sent to pt preferred pharmacy and printed prescriptions provided.  Pt verbalizes understanding of all written and verbal discharge instructions.  Pt awaiting to be transported to [facility] via wheelchair by transport. Family at bedside.  Will continue to monitor.

## 2019-10-22 NOTE — PLAN OF CARE
Patient free from falls and injury throughout shift. Patient AAO x4; VSS. Patient denies chest pain and SOB. Plan of care reviewed with patient. Patient verbalizes understanding. Will continue to monitor.

## 2019-10-23 NOTE — PROGRESS NOTES
Deborah Navas is a 49 y.o. female s/p HM3 from 9/19 admitted for evaluation of VT at OSH. During her hospital stay she was started on a 2 week load of amiodarone 400mg BID followed by daily. Her warfarin dose was decreased to 1.5mg orally daily MWF/3mg on other days.

## 2019-10-24 ENCOUNTER — TELEPHONE (OUTPATIENT)
Dept: TRANSPLANT | Facility: CLINIC | Age: 49
End: 2019-10-24

## 2019-10-24 ENCOUNTER — PATIENT OUTREACH (OUTPATIENT)
Dept: ADMINISTRATIVE | Facility: CLINIC | Age: 49
End: 2019-10-24

## 2019-10-24 ENCOUNTER — TELEPHONE (OUTPATIENT)
Dept: ELECTROPHYSIOLOGY | Facility: CLINIC | Age: 49
End: 2019-10-24

## 2019-10-24 ENCOUNTER — HOSPITAL ENCOUNTER (INPATIENT)
Facility: HOSPITAL | Age: 49
LOS: 6 days | Discharge: HOME HEALTH | DRG: 309 | End: 2019-10-30
Attending: INTERNAL MEDICINE | Admitting: INTERNAL MEDICINE
Payer: COMMERCIAL

## 2019-10-24 DIAGNOSIS — I47.20 VENTRICULAR TACHYCARDIA: ICD-10-CM

## 2019-10-24 DIAGNOSIS — Z76.82 HEART TRANSPLANT CANDIDATE: ICD-10-CM

## 2019-10-24 DIAGNOSIS — Z79.01 LONG TERM (CURRENT) USE OF ANTICOAGULANTS: Primary | ICD-10-CM

## 2019-10-24 DIAGNOSIS — I49.9 CARDIAC RHYTHM DISORDER OR DISTURBANCE OR CHANGE: ICD-10-CM

## 2019-10-24 DIAGNOSIS — I10 ESSENTIAL HYPERTENSION: ICD-10-CM

## 2019-10-24 DIAGNOSIS — Z95.811 LVAD (LEFT VENTRICULAR ASSIST DEVICE) PRESENT: ICD-10-CM

## 2019-10-24 DIAGNOSIS — I50.42 CHRONIC COMBINED SYSTOLIC AND DIASTOLIC CONGESTIVE HEART FAILURE: ICD-10-CM

## 2019-10-24 DIAGNOSIS — I49.01 VENTRICULAR FIBRILLATION: ICD-10-CM

## 2019-10-24 LAB
ANION GAP SERPL CALC-SCNC: 10 MMOL/L (ref 8–16)
BACTERIA SPEC AEROBE CULT: NO GROWTH
BUN SERPL-MCNC: 20 MG/DL (ref 6–20)
CALCIUM SERPL-MCNC: 9.3 MG/DL (ref 8.7–10.5)
CHLORIDE SERPL-SCNC: 103 MMOL/L (ref 95–110)
CO2 SERPL-SCNC: 24 MMOL/L (ref 23–29)
CREAT SERPL-MCNC: 1.4 MG/DL (ref 0.5–1.4)
EST. GFR  (AFRICAN AMERICAN): 50.5 ML/MIN/1.73 M^2
EST. GFR  (NON AFRICAN AMERICAN): 43.8 ML/MIN/1.73 M^2
GLUCOSE SERPL-MCNC: 106 MG/DL (ref 70–110)
INR PPP: 2.3 (ref 0.8–1.2)
MAGNESIUM SERPL-MCNC: 2 MG/DL (ref 1.6–2.6)
POTASSIUM SERPL-SCNC: 4 MMOL/L (ref 3.5–5.1)
PROTHROMBIN TIME: 22.4 SEC (ref 9–12.5)
SODIUM SERPL-SCNC: 137 MMOL/L (ref 136–145)

## 2019-10-24 PROCEDURE — 20600001 HC STEP DOWN PRIVATE ROOM: Mod: NTX

## 2019-10-24 PROCEDURE — 83735 ASSAY OF MAGNESIUM: CPT | Mod: NTX

## 2019-10-24 PROCEDURE — 99221 1ST HOSP IP/OBS SF/LOW 40: CPT | Mod: NTX,,, | Performed by: INTERNAL MEDICINE

## 2019-10-24 PROCEDURE — 85610 PROTHROMBIN TIME: CPT | Mod: NTX

## 2019-10-24 PROCEDURE — 80048 BASIC METABOLIC PNL TOTAL CA: CPT | Mod: NTX

## 2019-10-24 PROCEDURE — 36415 COLL VENOUS BLD VENIPUNCTURE: CPT | Mod: NTX

## 2019-10-24 PROCEDURE — 27000248 HC VAD-ADDITIONAL DAY: Mod: NTX

## 2019-10-24 PROCEDURE — 25000003 PHARM REV CODE 250: Mod: NTX | Performed by: INTERNAL MEDICINE

## 2019-10-24 PROCEDURE — 63600175 PHARM REV CODE 636 W HCPCS: Mod: NTX | Performed by: INTERNAL MEDICINE

## 2019-10-24 PROCEDURE — 99221 PR INITIAL HOSPITAL CARE,LEVL I: ICD-10-PCS | Mod: NTX,,, | Performed by: INTERNAL MEDICINE

## 2019-10-24 RX ORDER — WARFARIN 3 MG/1
3 TABLET ORAL
Status: DISCONTINUED | OUTPATIENT
Start: 2019-10-25 | End: 2019-10-24

## 2019-10-24 RX ORDER — PANTOPRAZOLE SODIUM 40 MG/1
40 TABLET, DELAYED RELEASE ORAL DAILY
Status: DISCONTINUED | OUTPATIENT
Start: 2019-10-25 | End: 2019-10-30 | Stop reason: HOSPADM

## 2019-10-24 RX ORDER — VENLAFAXINE 37.5 MG/1
150 TABLET ORAL DAILY
Status: DISCONTINUED | OUTPATIENT
Start: 2019-10-25 | End: 2019-10-30 | Stop reason: HOSPADM

## 2019-10-24 RX ORDER — MEXILETINE HYDROCHLORIDE 150 MG/1
150 CAPSULE ORAL EVERY 8 HOURS
Status: DISCONTINUED | OUTPATIENT
Start: 2019-10-24 | End: 2019-10-30 | Stop reason: HOSPADM

## 2019-10-24 RX ORDER — ASPIRIN 325 MG
325 TABLET, DELAYED RELEASE (ENTERIC COATED) ORAL DAILY
Status: DISCONTINUED | OUTPATIENT
Start: 2019-10-25 | End: 2019-10-30 | Stop reason: HOSPADM

## 2019-10-24 RX ORDER — LISINOPRIL 5 MG/1
5 TABLET ORAL 2 TIMES DAILY
Status: DISCONTINUED | OUTPATIENT
Start: 2019-10-24 | End: 2019-10-27

## 2019-10-24 RX ORDER — AMOXICILLIN 250 MG
2 CAPSULE ORAL 2 TIMES DAILY PRN
Status: DISCONTINUED | OUTPATIENT
Start: 2019-10-24 | End: 2019-10-30 | Stop reason: HOSPADM

## 2019-10-24 RX ORDER — SPIRONOLACTONE 25 MG/1
25 TABLET ORAL DAILY
Status: DISCONTINUED | OUTPATIENT
Start: 2019-10-25 | End: 2019-10-26

## 2019-10-24 RX ORDER — WARFARIN 3 MG/1
3 TABLET ORAL ONCE
Status: COMPLETED | OUTPATIENT
Start: 2019-10-24 | End: 2019-10-24

## 2019-10-24 RX ORDER — WARFARIN 3 MG/1
3 TABLET ORAL
Status: DISCONTINUED | OUTPATIENT
Start: 2019-10-26 | End: 2019-10-29

## 2019-10-24 RX ORDER — MIRTAZAPINE 15 MG/1
15 TABLET, FILM COATED ORAL NIGHTLY
Status: DISCONTINUED | OUTPATIENT
Start: 2019-10-24 | End: 2019-10-30 | Stop reason: HOSPADM

## 2019-10-24 RX ORDER — OXYCODONE AND ACETAMINOPHEN 5; 325 MG/1; MG/1
1 TABLET ORAL DAILY PRN
Status: DISCONTINUED | OUTPATIENT
Start: 2019-10-24 | End: 2019-10-30 | Stop reason: HOSPADM

## 2019-10-24 RX ADMIN — AMIODARONE HYDROCHLORIDE 1 MG/MIN: 1.8 INJECTION, SOLUTION INTRAVENOUS at 10:10

## 2019-10-24 RX ADMIN — GABAPENTIN 400 MG: 100 CAPSULE ORAL at 10:10

## 2019-10-24 RX ADMIN — LISINOPRIL 5 MG: 5 TABLET ORAL at 10:10

## 2019-10-24 RX ADMIN — MIRTAZAPINE 15 MG: 15 TABLET, FILM COATED ORAL at 10:10

## 2019-10-24 RX ADMIN — WARFARIN SODIUM 3 MG: 3 TABLET ORAL at 10:10

## 2019-10-24 RX ADMIN — MEXILETINE HYDROCHLORIDE 150 MG: 150 CAPSULE ORAL at 10:10

## 2019-10-24 NOTE — TELEPHONE ENCOUNTER
----- Message from Alecia Heredia RN sent at 10/24/2019 11:40 AM CDT -----  Regarding: RE: needs EP appt  Can you please schedule appointment, as requested?  Thanks  ----- Message -----  From: Deloris Salgado MA  Sent: 10/24/2019   9:09 AM CDT  To: Alecia Heredia RN  Subject: FW: needs EP appt                                    ----- Message -----  From: Aimee Kelley RN  Sent: 10/24/2019   8:57 AM CDT  To: Kate De Los Santos, HELEN, Nicole Jesus, RN, #  Subject: needs EP appt                                    Ms. Navas was seen in the hospital by Dr. Pace on 10/21/19. He added back amiodarone, changed her to DDD (RV only) pacing configuration and extended AV delays.    Last night, Ms. Navas was shocked once by her ICD. She is transmitting to her EP that she has locally.    I need to get her care transferred to Dr. Pace ASAP.   Please schedule her an appt soon. She has f/u with LVAD on 10/30.    Thank you,  Aimee

## 2019-10-24 NOTE — PROGRESS NOTES
Aimee with [facility] called to report INR was due today but she was unable to draw the INR, she reports that the Patient is on her way to the hospital--not sure if [facility] or local [facility] due to her difibrilator shocking her

## 2019-10-24 NOTE — TELEPHONE ENCOUNTER
Pt paged: she received one shock by her ICD while watching TV. There was no LOC. Pt asymptomatic. (Pt was recently in the hospital for VT, ICD shocks, Dr. Pace restarted amio and changed settings on device).    I spoke with Dr. Antunez. Pt does not need to report to ED for one shock. If pt is shocked a second time, will need to report to local ED. Pt to transmit interrogation to her local EP. I will speak with [facility] in the AM regarding transfer of care and f/u appt as one was not made on hospital D/C.    Reviewed all of the above with the pt via p/c. Pt verbalizes understanding of instructions.

## 2019-10-24 NOTE — PATIENT INSTRUCTIONS
Uncertain Causes of Fall  You have had a fall today. But the cause of your fall is not certain. Falls can occur due to slipping, tripping or losing your balance. A fall can also occur from a fainting spell or seizure.  While a fall can happen for a simple reason (tripping over something), falls in elderly people are often caused by a combination of things:  · Age-related decline in function with worsening balance, stability, vision, and muscle strength  · Chronic illness such as heart arrhythmias, heart valve disease, vascular disease, COPD, diabetes, strokes, arthritis  · Effects or side effects of medicines  · Dehydration.  · Environmental hazards such as uneven or slippery ground, unfamiliar place, obstacles, uneven surfaces, or slippery ground  · Situational factors (related to the activity being done, e.g., rushing to the bathroom)  Because the cause of your fall today is not certain, it is possible that a fainting spell or seizure was the cause. This means that it could happen again, without warning. If you fall again, without a cause, then you should return to this facility promptly to have further tests. Otherwise, follow up with your doctor as explained below.  It is normal to feel sore and tight in your muscles and back the next day, and not just the muscles you initially injured. Remember, all the parts of your body are connected, so while initially one area hurts, the next day another may hurt. Also, when you injure yourself, it causes inflammation, which then causes the muscles to tighten up and hurt more. After the initial worsening, it should gradually improve over the next few days. However, more severe pain should be reported.  Even without a definite head injury, you can still get a concussion. Concussions and even bleeding can still occur, especially if you have had a recent injury or take blood thinner medicine. It is not unusual to have a mild headache and feel tired and even nauseous or  dizzy.  Home care  · Rest today and resume your normal activities as soon as you are feeling back to normal. It is best to remain with someone who can check on you for the next 24 hours to watch for another episode of falling.  · If you were injured during the fall, follow the advice from your doctor regarding care of your injury.  ·  If you become light-headed or dizzy, lie down immediately or sit and lean forward with your head down.  · As a precaution, do not drive a car or operate dangerous equipment, do not take a bath alone (use a shower instead) and do not swim alone until you see your doctor. A condition causing fainting or seizures must be ruled out before resuming these activities.  · You may use acetaminophen or ibuprofen to control pain, unless another pain medicine was prescribed. If you have chronic liver or kidney disease or ever had a stomach ulcer or gastrointestinal bleeding, talk with your doctor before using these medicines.  · Keep your appointments for any further testing that may have been scheduled for you.  Follow-up care  Follow up with your healthcare provider, or as advised.  If X-rays or CT scan were done, you will be notified if there is a change in the reading, especially if it affects treatment.  Call 911  Call 911 if any of these occur:  · Trouble breathing  · Confused or difficulty arousing  · Fainting or loss of consciousness  · Rapid or very slow heart rate  · Seizure  · Difficulty with speech or vision, weakness of an arm or leg  · Difficulty walking or talking, loss of balance, numbness or weakness in one side of your body, facial droop  When to seek medical advice  Call your healthcare provider right away if any of these occur:  · Another unexplained fall  · Dizziness  · Severe headache  · Nausea and vomiting  · Blood in vomit, stools (black or red color)  Date Last Reviewed: 11/5/2015  © 9732-6096 [employer]. [address] [place] [address], [place] [address]. All rights  reserved. This information is not intended as a substitute for professional medical care. Always follow your healthcare professional's instructions.

## 2019-10-25 ENCOUNTER — TELEPHONE (OUTPATIENT)
Dept: ELECTROPHYSIOLOGY | Facility: CLINIC | Age: 49
End: 2019-10-25

## 2019-10-25 LAB
ALBUMIN SERPL BCP-MCNC: 3.1 G/DL (ref 3.5–5.2)
ALP SERPL-CCNC: 83 U/L (ref 55–135)
ALT SERPL W/O P-5'-P-CCNC: 13 U/L (ref 10–44)
ANION GAP SERPL CALC-SCNC: 11 MMOL/L (ref 8–16)
APTT BLDCRRT: 37.3 SEC (ref 21–32)
AST SERPL-CCNC: 16 U/L (ref 10–40)
BASOPHILS # BLD AUTO: 0.07 K/UL (ref 0–0.2)
BASOPHILS NFR BLD: 1.3 % (ref 0–1.9)
BILIRUB DIRECT SERPL-MCNC: 0.3 MG/DL (ref 0.1–0.3)
BILIRUB SERPL-MCNC: 0.4 MG/DL (ref 0.1–1)
BNP SERPL-MCNC: 164 PG/ML (ref 0–99)
BUN SERPL-MCNC: 21 MG/DL (ref 6–20)
CALCIUM SERPL-MCNC: 9.3 MG/DL (ref 8.7–10.5)
CHLORIDE SERPL-SCNC: 104 MMOL/L (ref 95–110)
CO2 SERPL-SCNC: 24 MMOL/L (ref 23–29)
CREAT SERPL-MCNC: 1.4 MG/DL (ref 0.5–1.4)
CRP SERPL-MCNC: 39.1 MG/L (ref 0–8.2)
DIFFERENTIAL METHOD: ABNORMAL
EOSINOPHIL # BLD AUTO: 0.2 K/UL (ref 0–0.5)
EOSINOPHIL NFR BLD: 3.5 % (ref 0–8)
ERYTHROCYTE [DISTWIDTH] IN BLOOD BY AUTOMATED COUNT: 16.6 % (ref 11.5–14.5)
EST. GFR  (AFRICAN AMERICAN): 50.5 ML/MIN/1.73 M^2
EST. GFR  (NON AFRICAN AMERICAN): 43.8 ML/MIN/1.73 M^2
GLUCOSE SERPL-MCNC: 118 MG/DL (ref 70–110)
HCT VFR BLD AUTO: 33.3 % (ref 37–48.5)
HGB BLD-MCNC: 10.1 G/DL (ref 12–16)
IMM GRANULOCYTES # BLD AUTO: 0.01 K/UL (ref 0–0.04)
IMM GRANULOCYTES NFR BLD AUTO: 0.2 % (ref 0–0.5)
INR PPP: 2.3 (ref 0.8–1.2)
LDH SERPL L TO P-CCNC: 215 U/L (ref 110–260)
LYMPHOCYTES # BLD AUTO: 1.1 K/UL (ref 1–4.8)
LYMPHOCYTES NFR BLD: 19.7 % (ref 18–48)
MAGNESIUM SERPL-MCNC: 2 MG/DL (ref 1.6–2.6)
MCH RBC QN AUTO: 25.8 PG (ref 27–31)
MCHC RBC AUTO-ENTMCNC: 30.3 G/DL (ref 32–36)
MCV RBC AUTO: 85 FL (ref 82–98)
MONOCYTES # BLD AUTO: 0.4 K/UL (ref 0.3–1)
MONOCYTES NFR BLD: 6.5 % (ref 4–15)
NEUTROPHILS # BLD AUTO: 3.7 K/UL (ref 1.8–7.7)
NEUTROPHILS NFR BLD: 68.8 % (ref 38–73)
NRBC BLD-RTO: 0 /100 WBC
PHOSPHATE SERPL-MCNC: 4 MG/DL (ref 2.7–4.5)
PLATELET # BLD AUTO: 289 K/UL (ref 150–350)
PMV BLD AUTO: 9.8 FL (ref 9.2–12.9)
POTASSIUM SERPL-SCNC: 3.6 MMOL/L (ref 3.5–5.1)
PREALB SERPL-MCNC: 21 MG/DL (ref 20–43)
PROT SERPL-MCNC: 6.7 G/DL (ref 6–8.4)
PROTHROMBIN TIME: 22.6 SEC (ref 9–12.5)
RBC # BLD AUTO: 3.91 M/UL (ref 4–5.4)
SODIUM SERPL-SCNC: 139 MMOL/L (ref 136–145)
WBC # BLD AUTO: 5.39 K/UL (ref 3.9–12.7)

## 2019-10-25 PROCEDURE — 63600175 PHARM REV CODE 636 W HCPCS: Mod: NTX | Performed by: INTERNAL MEDICINE

## 2019-10-25 PROCEDURE — 93750 INTERROGATION VAD IN PERSON: CPT | Mod: NTX,,, | Performed by: INTERNAL MEDICINE

## 2019-10-25 PROCEDURE — 99223 1ST HOSP IP/OBS HIGH 75: CPT | Mod: NTX,,, | Performed by: INTERNAL MEDICINE

## 2019-10-25 PROCEDURE — 36415 COLL VENOUS BLD VENIPUNCTURE: CPT | Mod: NTX

## 2019-10-25 PROCEDURE — 99223 PR INITIAL HOSPITAL CARE,LEVL III: ICD-10-PCS | Mod: NTX,,, | Performed by: INTERNAL MEDICINE

## 2019-10-25 PROCEDURE — 93750 PR INTERROGATE VENT ASSIST DEV, IN PERSON, W PHYSICIAN ANALYSIS: ICD-10-PCS | Mod: NTX,,, | Performed by: INTERNAL MEDICINE

## 2019-10-25 PROCEDURE — 20600001 HC STEP DOWN PRIVATE ROOM: Mod: NTX

## 2019-10-25 PROCEDURE — 83615 LACTATE (LD) (LDH) ENZYME: CPT | Mod: NTX

## 2019-10-25 PROCEDURE — 94761 N-INVAS EAR/PLS OXIMETRY MLT: CPT | Mod: NTX

## 2019-10-25 PROCEDURE — 99233 PR SUBSEQUENT HOSPITAL CARE,LEVL III: ICD-10-PCS | Mod: NTX,,, | Performed by: INTERNAL MEDICINE

## 2019-10-25 PROCEDURE — 83880 ASSAY OF NATRIURETIC PEPTIDE: CPT | Mod: NTX

## 2019-10-25 PROCEDURE — 27000248 HC VAD-ADDITIONAL DAY: Mod: NTX

## 2019-10-25 PROCEDURE — 25000003 PHARM REV CODE 250: Mod: NTX | Performed by: PHYSICIAN ASSISTANT

## 2019-10-25 PROCEDURE — 84100 ASSAY OF PHOSPHORUS: CPT | Mod: NTX

## 2019-10-25 PROCEDURE — 80048 BASIC METABOLIC PNL TOTAL CA: CPT | Mod: NTX

## 2019-10-25 PROCEDURE — 99233 SBSQ HOSP IP/OBS HIGH 50: CPT | Mod: NTX,,, | Performed by: INTERNAL MEDICINE

## 2019-10-25 PROCEDURE — 85025 COMPLETE CBC W/AUTO DIFF WBC: CPT | Mod: NTX

## 2019-10-25 PROCEDURE — 63600175 PHARM REV CODE 636 W HCPCS: Mod: NTX | Performed by: PHYSICIAN ASSISTANT

## 2019-10-25 PROCEDURE — 86140 C-REACTIVE PROTEIN: CPT | Mod: NTX

## 2019-10-25 PROCEDURE — 83735 ASSAY OF MAGNESIUM: CPT | Mod: NTX

## 2019-10-25 PROCEDURE — 85610 PROTHROMBIN TIME: CPT | Mod: NTX

## 2019-10-25 PROCEDURE — 25000003 PHARM REV CODE 250: Mod: NTX | Performed by: INTERNAL MEDICINE

## 2019-10-25 PROCEDURE — 80076 HEPATIC FUNCTION PANEL: CPT | Mod: NTX

## 2019-10-25 PROCEDURE — 85730 THROMBOPLASTIN TIME PARTIAL: CPT | Mod: NTX

## 2019-10-25 PROCEDURE — 84134 ASSAY OF PREALBUMIN: CPT | Mod: NTX

## 2019-10-25 RX ORDER — ALPRAZOLAM 0.25 MG/1
0.25 TABLET ORAL 2 TIMES DAILY PRN
Status: DISCONTINUED | OUTPATIENT
Start: 2019-10-25 | End: 2019-10-30 | Stop reason: HOSPADM

## 2019-10-25 RX ORDER — ONDANSETRON 2 MG/ML
4 INJECTION INTRAMUSCULAR; INTRAVENOUS EVERY 6 HOURS PRN
Status: DISCONTINUED | OUTPATIENT
Start: 2019-10-25 | End: 2019-10-30 | Stop reason: HOSPADM

## 2019-10-25 RX ORDER — POTASSIUM CHLORIDE 20 MEQ/1
40 TABLET, EXTENDED RELEASE ORAL ONCE
Status: COMPLETED | OUTPATIENT
Start: 2019-10-25 | End: 2019-10-25

## 2019-10-25 RX ORDER — LANOLIN ALCOHOL/MO/W.PET/CERES
400 CREAM (GRAM) TOPICAL DAILY
Status: DISCONTINUED | OUTPATIENT
Start: 2019-10-25 | End: 2019-10-30 | Stop reason: HOSPADM

## 2019-10-25 RX ADMIN — ONDANSETRON 4 MG: 2 INJECTION INTRAMUSCULAR; INTRAVENOUS at 10:10

## 2019-10-25 RX ADMIN — GABAPENTIN 400 MG: 100 CAPSULE ORAL at 09:10

## 2019-10-25 RX ADMIN — LISINOPRIL 5 MG: 5 TABLET ORAL at 09:10

## 2019-10-25 RX ADMIN — SPIRONOLACTONE 25 MG: 25 TABLET, FILM COATED ORAL at 08:10

## 2019-10-25 RX ADMIN — MEXILETINE HYDROCHLORIDE 150 MG: 150 CAPSULE ORAL at 09:10

## 2019-10-25 RX ADMIN — LISINOPRIL 5 MG: 5 TABLET ORAL at 08:10

## 2019-10-25 RX ADMIN — VENLAFAXINE 150 MG: 37.5 TABLET ORAL at 08:10

## 2019-10-25 RX ADMIN — Medication 400 MG: at 08:10

## 2019-10-25 RX ADMIN — AMIODARONE HYDROCHLORIDE 1 MG/MIN: 1.8 INJECTION, SOLUTION INTRAVENOUS at 10:10

## 2019-10-25 RX ADMIN — MEXILETINE HYDROCHLORIDE 150 MG: 150 CAPSULE ORAL at 06:10

## 2019-10-25 RX ADMIN — ALPRAZOLAM 0.25 MG: 0.25 TABLET ORAL at 02:10

## 2019-10-25 RX ADMIN — GABAPENTIN 400 MG: 100 CAPSULE ORAL at 08:10

## 2019-10-25 RX ADMIN — AMIODARONE HYDROCHLORIDE 1 MG/MIN: 1.8 INJECTION, SOLUTION INTRAVENOUS at 04:10

## 2019-10-25 RX ADMIN — WARFARIN SODIUM 1.5 MG: 1 TABLET ORAL at 04:10

## 2019-10-25 RX ADMIN — POTASSIUM CHLORIDE 40 MEQ: 1500 TABLET, EXTENDED RELEASE ORAL at 08:10

## 2019-10-25 RX ADMIN — AMIODARONE HYDROCHLORIDE 0.5 MG/MIN: 1.8 INJECTION, SOLUTION INTRAVENOUS at 05:10

## 2019-10-25 RX ADMIN — MIRTAZAPINE 15 MG: 15 TABLET, FILM COATED ORAL at 09:10

## 2019-10-25 RX ADMIN — ASPIRIN 325 MG: 325 TABLET, DELAYED RELEASE ORAL at 08:10

## 2019-10-25 RX ADMIN — PANTOPRAZOLE SODIUM 40 MG: 40 TABLET, DELAYED RELEASE ORAL at 08:10

## 2019-10-25 RX ADMIN — MEXILETINE HYDROCHLORIDE 150 MG: 150 CAPSULE ORAL at 02:10

## 2019-10-25 NOTE — ASSESSMENT & PLAN NOTE
-HeartMate 3 Implanted 9/10/19 as DT.  -Implanted by Dr. Walden  -INR 2.3 today. Further adjustments per pharmacy. Goal INR 2.0-3.0.   -Antiplatelets:  mg.  -LDH stable  -Speed set at 5200.  -Not listed for [facility]HTx: was declined due to pulmonary hypertension. Will get RHC Monday if patient amenable in effort to move forward with listing.        Procedure: Device Interrogation Including analysis of device parameters  Current Settings: Ventricular Assist Device  Review of device function is stable    TXP LVAD INTERROGATIONS 10/25/2019 10/25/2019 10/25/2019 10/24/2019 10/22/2019 10/22/2019 10/22/2019   Type HeartMate3 HeartMate3 HeartMate3 HeartMate3 HeartMate3 HeartMate3 HeartMate3   Flow 3.1 3.1 3.4 3.5 3.6 3.1 3.4   Speed 5200 5200 5200 5200 5200 5250 5250   PI 5.6 7.1 4.4 4.4 4.5 5.9 4.9   Power ([place]) 3.5 3.6 3.5 3.6 3.5 3.5 3.5   LSL 4800 - - 4800 4800 - -   Pulsatility No Pulse - - Pulse Pulse - -

## 2019-10-25 NOTE — PROCEDURES
PRE-TEST DATA   DEVICES: Medtronic CRT-D    Mode: DDD    Lower limit rate is 60 bpm  Upper tracking rate is 130 bpm  Max sensor rate is 120 bpm    Paced AV delay/Sensed AV delay: 350/350 ms    Therapies  VT: 146 bpm. ATP *6  FVT: 171 bpm. ATP ramp. 25 J. 35 J *4  VF: 222 bpm. ATP before charging. 35 J *6    The patient had 2 treated episodes since last interrogation.     Episodes:  #81 - FVT. ATP 2. Shock 25 J. Successful. 10/23/19. 20:57  #83 - FVT ATP 3. Shock 35 J. Successful. 10/24/19. 12:38    Alphonse Valdes MD  Cardiology Fellow, PGY4

## 2019-10-25 NOTE — NURSING TRANSFER
Nursing Transfer Note      10/25/2019     [facility]    Transfer via stretcher    Transfer with cardiac monitoring    Transported by ambulance    Medicines sent: n/a    Chart send with patient: Yes    Notified: friend    Patient reassessed at: 10/24/19 @ 1930    Upon arrival to floor: cardiac monitor applied, patient oriented to room, call bell in reach and bed in lowest position    Pt oriented to room and reassessed. AAOx4 and VSS. MD @ bedside. Will continue to monitor.

## 2019-10-25 NOTE — NURSING
Notified HTS of doppler 92/0 @ 0030. Unable to obtain bp w/ cuff. No new orders at this time. Will continue to monitor.

## 2019-10-25 NOTE — TELEPHONE ENCOUNTER
----- Message from Suha Barrientos sent at 10/25/2019 10:04 AM CDT -----  Regarding: RE: needs EP appt  Deloris  I scheduled patient on Dr. Pace's [place] schedule at [facility] on October 30th at 10:20 am.  Please call patient and make sure she understands her appointment with Sanjeev is 10:20 in [place] and the other appointments with the Heart Failure team are here in [place].    ----- Message -----  From: Deloris Salgado MA  Sent: 10/24/2019  12:51 PM CDT  To: Suha Barrientos, Alecia Heredia RN  Subject: FW: needs EP appt                                Lory Borden , is their anyway we can overbook  schedule for some time soon . It us urgent for this pt . She stated that she just got shocked again today , and maybe be admitted in hospital in  .  ----- Message -----  From: Alecia Heredia RN  Sent: 10/24/2019  11:40 AM CDT  To: Deloris Salgado MA  Subject: RE: needs EP appt                                Can you please schedule appointment, as requested?  Thanks  ----- Message -----  From: Deloris Salgado MA  Sent: 10/24/2019   9:09 AM CDT  To: Alecia Heredia RN  Subject: FW: needs EP appt                                    ----- Message -----  From: Aimee Kelley RN  Sent: 10/24/2019   8:57 AM CDT  To: Kate De Los Santos, HELEN, Nicole Jesus RN, #  Subject: needs EP appt                                    Ms. Navas was seen in the hospital by Dr. Pace on 10/21/19. He added back amiodarone, changed her to DDD (RV only) pacing configuration and extended AV delays.    Last night, Ms. Navas was shocked once by her ICD. She is transmitting to her EP that she has locally.    I need to get her care transferred to Dr. Sanjeev BUSTOS.   Please schedule her an appt soon. She has f/u with LVAD on 10/30.    Thank you,  Aimee

## 2019-10-25 NOTE — PROGRESS NOTES
Admit Note     Met with pt and her friend Flavia to assess needs. Patient is a 49 y.o. single female, admitted for ventricular tachycardia, ICD shock at home x2, essential hypertension and LVAD.  Pt received her LVAD 9/10/19.       Patient admitted to [facility]  on 10/24/2019 .  Pt was last discharged on 10/22/19.    At this time, patient presents as alert and oriented x 4, good eye contact, calm and communicative.  At this time, patients caregiver presents as alert and oriented x 4, good eye contact, calm and communicative.    Household/Family Systems     Currently the patient resides with her friend Flavia at:    [address] [place] [address]    When the pt is ready she will live in her own home alone at:      [address]  [place] [address].        Support system includes, mother, sister and many friends.    Patient does not have dependents that are need of being cared for.   The pt does not have any children.   The pt reports her friend Flavia and mother will be listed as primary and secondary caregivers for transplant.      Patients primary caregiver is self with assistance from Flavia.     Pt's cell:  [phone]    Emergency contact:  Cristal Navas (mother, lives in [place], [place], five hours from [place], works part time, drives and is one of the pt's HCPA)  Sharondamani Kitchen (sister, lives in [place], HCPA) [phone]  Flavia Nuñez (best friend, lives in [place], works full time and drives, HCPA) [phone]    During admission, patient's caregiver plans to stay in patient's room.  Confirmed patient and patients caregivers do have access to reliable transportation.    Cognitive Status/Learning     Patient reports reading ability as college and states patient does not have difficulty with reading, writing, seeing, hearing and memory. Pt does wear reading glasses. The pt reports continued support is needed with comprehending and learning new information.    Patient reports patient learns  best by visual.      Needed: No.   Highest education level: Attended College/Technical School    Vocation/Disability   .  Working for Income: No, pt is on leave from work.  If no, reason not working: Disability, pt receives short term disability from work. Pt reports short term disability will end when she is cleared by the MD to return to work.   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   Pt reports no financial changes since last admission.     Adherence     Patient reports a high level of adherence to patients health care regimen.  Adherence counseling and education provided. Patient verbalizes understanding.    Substance Use    Patient reports the following substance usage.    Tobacco: none, patient denies any use.  Alcohol: none, patient denies any use.  Illicit Drugs/Non-prescribed Medications: none, patient denies any use.  Patient states clear understanding of the potential impact of substance use.  Substance abstinence/cessation counseling, education and resources provided and reviewed.     Services Utilizing/ADLS    Infusion Service: Prior to admission, patient utilizing? no  Home Health: Prior to admission, patient utilizing? yes [facility]  [phone], fax [phone]  Pt is receiving  nursing once a week and physical therapy twice a week.  Pt's home occupational therapy evaluation is complete and pt reports she will start to receive services when discharged home.   DME: Prior to admission, no  Pulmonary/Cardiac Rehab: Prior to admission, no, Pt participated in a cardiac rehab program in the past.  Program ended May 2019.  Dialysis:  Prior to admission, no  Transplant Specialty Pharmacy:  Prior to admission, no.    Prior to admission, patient reports patient was mostly independent with ADLS. Pt does require support from her friend once in a while.  Pt does not drive due to fatigue and ICD. Pt's family and friends drive.    Patient reports patient is not  "able to care for self at this time due to compromised medical condition (as documented in medical record) and physical weakness..  Patient indicates a willingness to care for self once medically cleared to do so.    Insurance/Medications    Insured by   Payor/Plan Subscr  Sex Relation Sub. Ins. ID Effective Group Num   1. BLUE CROSS BL* YON VILLARREAL* 1970 Female  [id number]* [id number]                                   [address] [address]      Primary Insurance (for UNOS reporting): Private Insurance  Secondary Insurance (for UNOS reporting): None    Patient reports patient is able to obtain and afford medications at this time and at time of discharge.    Living Will/Healthcare Power of     Patient states patient has a LW and/or HCPA. See emergency contact list.     provided education regarding LW and HCPA and the completion of forms.    Coping/Mental Health    Patient is coping adequately with the aid of  family members and friends.   Patient indicates mental health difficulties.   Pt reports difficulty with depression and increased anxiety due to multiple medical needs and ICD shocks.  The pt reports any time she has a "chest flutter", "hicup" or slight pain she feels like she will be shocked.  Pt reports increased difficulty with sleeping.  Pt is currently taking Effexor and Remeron to manage her mental health issues, however pt is requesting additional medication for anxiety.  Pt reports  she has a good support system and does not feel out pt counseling is indicated at this time.    Worker provided extensive emotional support, encouragement, and answered pt's  questions about caregiver support/responsibilities.    Discharge Planning    At time of discharge, patient plans to return to Flavia's home under the care of self and friend.  Patients friend will transport patient.  Per rounds today, expected discharge date has not been medically determined at this time. Patient and " patients caregiver  verbalize understanding and are involved in treatment planning and discharge process.    Additional Concerns    Patient is being followed for needs, education, resources, information, emotional support, supportive counseling, and for supportive and skilled discharge plan of care.  providing ongoing psychosocial support, education, resources and d/c planning as needed.  SW remains available. Patient's caregiver verbalizes understanding and agreement with information reviewed,  availability and how to access available resources as needed. Patient verbalizes understanding and agreement with information reviewed, social work availability, and how to access available resources as needed.

## 2019-10-25 NOTE — ASSESSMENT & PLAN NOTE
-HeartMate 3 Implanted 9/10/19 as DT.  -Implanted by Dr. Walden  -INR 2.3 today. Will give 3 mg tonight as per home dosage. Further adjustments per pharmacy. Goal INR 2.0-3.0.   -Antiplatelets:  mg.  -Check LDH in AM.   -Speed set at 5200.  -Not listed for OHTx: was declined due to pulmonary hypertension and incomplete care giving plan       Procedure: Device Interrogation Including analysis of device parameters  Current Settings: Ventricular Assist Device  Review of device function is stable    TXP LVAD INTERROGATIONS 10/22/2019 10/22/2019 10/22/2019 10/21/2019 10/21/2019 10/21/2019 10/21/2019   Type HeartMate3 HeartMate3 HeartMate3 HeartMate3 HeartMate3 HeartMate3 HeartMate3   Flow 3.6 3.1 3.4 4.0 3.5 3.5 3.7   Speed 5200 5250 5250 5200 5250 5200 5200   PI 4.5 5.9 4.9 3.7 4.2 4.1 4.2   Power (Orellana) 3.5 3.5 3.5 3.6 3.7 3.6 3.6   LSL 4800 - - 4800 - - -   Pulsatility Pulse - - Intermittent pulse - - -

## 2019-10-25 NOTE — SUBJECTIVE & OBJECTIVE
Past Medical History:   Diagnosis Date    Fractures     History of ventricular fibrillation 9/4/2019    History of ventricular tachycardia 9/4/2019    Hyperlipidemia     Migraine     Osteoarthritis        Past Surgical History:   Procedure Laterality Date    BACK SURGERY      2007    BONE GRAFT Left     from Left hip to Left FA    eardrum reconstruction  1980    ELBOW SURGERY Left [id number]    FOREARM FRACTURE SURGERY Bilateral [id number]    multiple surgeries    INSERTION OF GRAFT TO PERICARDIUM  9/10/2019    Procedure: INSERTION, GRAFT, PERICARDIUM;  Surgeon: Shar Walden MD;  Location: [facility] OR [address] [place] [address];  Service: Cardiovascular;;    INSERTION OF IMPLANTABLE CARDIOVERTER-DEFIBRILLATOR (ICD) GENERATOR WITH TWO EXISTING LEADS      INSERTION OF PACEMAKER Left     LEFT VENTRICULAR ASSIST DEVICE N/A 9/10/2019    Procedure: INSERTION-LEFT VENTRICULAR ASSIST DEVICE;  Surgeon: Shar Walden MD;  Location: [facility] OR [address] [place] [address];  Service: Cardiovascular;  Laterality: N/A;  DT HM3     LUMBAR FUSION  2007    L4-L5    RIGHT HEART CATHETERIZATION Right 9/4/2019    Procedure: INSERTION, CATHETER, RIGHT HEART;  Surgeon: Vi Bryant MD;  Location: [facility] CATH LAB;  Service: Cardiology;  Laterality: Right;    SINUS SURGERY Right 1994    with lymph nodes    STERNAL WOUND CLOSURE  9/10/2019    Procedure: CLOSURE, WOUND, STERNUM;  Surgeon: Shar Walden MD;  Location: [facility] OR [address] [place] [address];  Service: Cardiovascular;;    TEMPOROMANDIBULAR JOINT SURGERY Right 1988    TONSILLECTOMY      TREATMENT OF CARDIAC ARRHYTHMIA N/A 9/24/2019    Procedure: CARDIOVERSION;  Surgeon: Moe Barrera MD;  Location: [facility] EP LAB;  Service: Cardiology;  Laterality: N/A;  AF, DCCV/NADINE, anes, DM, Rm 3073    TYMPANOSTOMY TUBE PLACEMENT  1971- 1979    multiple tube placements       Review of patient's allergies indicates:   Allergen Reactions    Adhesive Blisters     Reaction to area in chest and up only    Codeine Itching       Current  Facility-Administered Medications   Medication    amiodarone 360 mg/200 mL (1.8 mg/mL) infusion    [START ON 10/25/2019] aspirin EC tablet 325 mg    gabapentin capsule 400 mg    lisinopril tablet 5 mg    mexiletine capsule 150 mg    mirtazapine tablet 15 mg    oxyCODONE-acetaminophen 5-325 mg per tablet 1 tablet    [START ON 10/25/2019] pantoprazole EC tablet 40 mg    senna-docusate 8.6-50 mg per tablet 2 tablet    [START ON 10/25/2019] spironolactone tablet 25 mg    [START ON 10/25/2019] venlafaxine tablet 150 mg    warfarin (COUMADIN) tablet 3 mg    [START ON 10/26/2019] warfarin (COUMADIN) tablet 3 mg    [START ON 10/25/2019] warfarin split tablet 1.5 mg     Family History     Problem Relation (Age of Onset)    Broken bones Maternal Grandmother    Cancer Paternal Grandmother    Dislocations Maternal Grandmother    Heart failure Paternal Grandfather, Maternal Grandfather    Migraines Mother, Maternal Grandmother, Paternal Grandmother, Paternal Grandfather, Maternal Grandfather    Obesity Paternal Grandmother    Osteoarthritis Mother, Maternal Grandmother, Paternal Grandmother, Paternal Grandfather, Maternal Grandfather, Father    Osteoporosis Maternal Grandmother    Scoliosis Maternal Grandmother    Stroke Father        Tobacco Use    Smoking status: Never Smoker    Smokeless tobacco: Never Used   Substance and Sexual Activity    Alcohol use: No    Drug use: No    Sexual activity: Not Currently     Review of Systems   Constitutional: Negative for chills, fatigue and fever.   Respiratory: Negative for cough, chest tightness and shortness of breath.    Cardiovascular: Negative for chest pain, palpitations and leg swelling.   Gastrointestinal: Negative for abdominal distention, abdominal pain, diarrhea, nausea and vomiting.   Genitourinary: Negative for dysuria.   Musculoskeletal: Negative for back pain.   Neurological: Negative for light-headedness and headaches.     Objective:     Vital Signs  (Most Recent):  Temp: 97.6 °F (36.4 °C) (10/24/19 1915)  Resp: 18 (10/24/19 1915)  BP: 99/75 (10/24/19 1915)  SpO2: 100 % (10/24/19 1915) Vital Signs (24h Range):  Temp:  [97.6 °F (36.4 °C)-98.3 °F (36.8 °C)] 97.6 °F (36.4 °C)  Pulse:  [72-97] 97  Resp:  [18] 18  SpO2:  [98 %-100 %] 100 %  BP: (92-99)/(67-75) 99/75     No data found.  There is no height or weight on file to calculate BMI.    No intake or output data in the 24 hours ending 10/24/19 2209    Physical Exam   Constitutional: She is oriented to person, place, and time. She appears well-developed and well-nourished.   HENT:   Head: Normocephalic and atraumatic.   Eyes: EOM are normal.   Neck: Normal range of motion. Neck supple. No JVD present.   Cardiovascular:   VAD hum   Pulmonary/Chest: Effort normal and breath sounds normal. No respiratory distress.   Abdominal: Soft. Bowel sounds are normal. She exhibits no distension.   Musculoskeletal: Normal range of motion. She exhibits no edema.   Neurological: She is alert and oriented to person, place, and time.       Significant Labs:  CBC:  Recent Labs   Lab 10/21/19  0712 10/22/19  0408   WBC 5.09 4.73   RBC 4.27 4.08   HGB 11.0* 10.5*   HCT 37.2 36.9*    265   MCV 87 90   MCH 25.8* 25.7*   MCHC 29.6* 28.5*     BNP:  Recent Labs   Lab 10/21/19  0049 10/21/19  0712   * 276*     CMP:  Recent Labs   Lab 10/21/19  0712 10/21/19  1531 10/22/19  0408 10/24/19  2037     --  116* 106   CALCIUM 9.6  --  9.4 9.3   ALBUMIN 3.4*  --   --   --    PROT 7.2  --   --   --      --  140 137   K 3.6 3.8 4.8 4.0   CO2 27  --  24 24     --  107 103   BUN 18  --  22* 20   CREATININE 1.3  --  1.4 1.4   ALKPHOS 88  --   --   --    ALT 17  --   --   --    AST 21  --   --   --    BILITOT 0.7  --   --   --       Coagulation:   Recent Labs   Lab 10/21/19  0712 10/22/19  0408 10/24/19  2037   INR 3.1* 3.0* 2.3*   APTT 37.5* 38.9*  --      LDH:  Recent Labs   Lab 10/22/19  0408         Microbiology:  Microbiology Results (last 7 days)     ** No results found for the last 168 hours. **          I have reviewed all pertinent labs within the past 24 hours.    Diagnostic Results:  I have reviewed all pertinent imaging results/findings within the past 24 hours.

## 2019-10-25 NOTE — PROGRESS NOTES
10/25/2019  Christo Cooper    Current provider:  Eric Antunez Jr.,*      I, Christo Cooper, rounded on Deborah Navas to ensure all mechanical assist device settings (IABP or VAD) were appropriate and all parameters were within limits.  I was able to ensure all back up equipment was present, the staff had no issues, and the Perfusion Department daily rounding was complete.    9:05 AM

## 2019-10-25 NOTE — ASSESSMENT & PLAN NOTE
Deborah Navas is a 48 y/o F with HFrEF (EF 20%) s/p HM3 implantation 9/10/19 (post up coarse uncomplicated with the exception of+ diaphragmatic stimulation of LV lead and pleural effusion with chest tube placement, atrial flutter with NDAINE/DCCV), V-fib reported in setting of hypokalemia with appropriate shock from ICD on Amiodarone prior to LVAD placement, who was transferred from our [facility] for an ICD shock.   - dual chamber Medtronic ICD placed Feb 2017 by Dr. Bose at Our [facility] for primary prevention   - MMVT of 3 different morphologies. Etiology: NICM  - no reversible etiologies noted (euvolemic, no active infection, electrolytes wnl)  - s/p IV load of amio -> would switch to PO amiodarone 400mg BID and agree with starting mexilitene 150 mg TID   - continue tele monitoring  - ensure K > 4, Mg >2

## 2019-10-25 NOTE — HPI
Deborah Navas is a 49 y.o. F with NICM s/p HM3 implantation 9/10/19 (uncomplicated with the exception of+ diaphragmatic stimulation of LV lead and pleural effusion with chest tube placement, atrial flutter with NADINE/DCCV), V-fib reported in setting of hypokalemia with appropriate shock from ICD on Amiodarone prior to LVAD placement. Transferred from outside hospital for ICD shock x2 at home.      Recently admitted from 10/20-10/22 also for ICD shock. EP was consulted who deemed her ICD shock appropriate for MMVT. Patient was IV loaded with amiodarone and transitioned to PO prior to discharge. Lasix was decreased from QD to EOD due to concerns for hypovolemia. Her driveline was cultured due to pain and minimal drainage, no growth on cultures.      On 10/23, she was sitting down watching TV when she suddenly felt a jumping sensation in her left lower chest followed by a shock. Denies LOC, dizziness or palpitations prior to this episode. After the shock she felt slightly nauseous. She called the clinic and was advised to go to ER if she had a second shock. Today she had a similar episode around 12 pm and went to her local ED. Otherwise doing well since discharged on Tuesday

## 2019-10-25 NOTE — HPI
50 yo WF with NICM, s/p HM3 implantation 9/10/19 (post up coarse uncomplicated with the exception of+ diaphragmatic stimulation of LV lead and pleural effusion with chest tube placement, atrial flutter with NADINE/DCCV), V-fib reported in setting of hypokalemia with appropriate shock from ICD on Amiodarone prior to LVAD placement. Transferred from outside hospital for ICD shock x2 at home.     Recently admitted (10/20-10/22) also for ICD shock. EP was consulted who deemed her ICD shock appropriate for MMVT. Patient was IV loaded with amiodarone and transitioned to PO prior to discharge. Lasix was decreased from QD to QOD due to concerns for hypovolemia. Her driveline was cultured due to pain and minimal drainage, no growth on cultures.     On 10/23, a day post discharge, she was watching TV when she felt a shock in her left lower chest. Denied LOC, dizziness or palpitations prior to this episode. After the shock she felt slightly nauseous. HF-Clinic advised to go to ER if she had a second shock, which she did on 10/24 around 12 pm and went to her local ED.

## 2019-10-25 NOTE — ASSESSMENT & PLAN NOTE
- Patient recently discharged after an episode of VT required ICD shock. Now admitted after two ICD shocks. Interrogation showed an episode of VT on 10/23 at 8:57 pm and one episode of VT->Vf on 10/24 at 12:38 pm.   - Electrolytes WNL. Bedside echo with LV unchanged in size (no collapse).   - Continue amiodarone infusion 1 mg/min for now. Will start mexiletine 150 mg TID  - Will consult EP in AM

## 2019-10-25 NOTE — PROGRESS NOTES
[facility]  Heart Transplant  Progress Note    Patient Name: Deborah Navas  MRN: [id number]  Admission Date: 10/24/2019  Hospital Length of Stay: 1 days  Attending Physician: Eric Antunez Jr.,*  Primary Care Provider: Primary Doctor No  Principal Problem:Ventricular tachycardia    Subjective:     Interval History: Patient without complaints. Mexitil started overnight. EP consult placed.     Continuous Infusions:   amiodarone in dextrose 5% 1 mg/min (10/25/19 1032)     Scheduled Meds:   aspirin  325 mg Oral Daily    gabapentin  400 mg Oral BID    lisinopril  5 mg Oral BID    magnesium oxide  400 mg Oral Daily    mexiletine  150 mg Oral Q8H    mirtazapine  15 mg Oral QHS    pantoprazole  40 mg Oral Daily    spironolactone  25 mg Oral Daily    venlafaxine  150 mg Oral Daily    [START ON 10/26/2019] warfarin  3 mg Oral Every Tues, Thurs, Sat, Sun    warfarin  1.5 mg Oral Every Mon, Wed, Fri     PRN Meds:ondansetron, oxyCODONE-acetaminophen, senna-docusate 8.6-50 mg    Review of patient's allergies indicates:   Allergen Reactions    Adhesive Blisters     Reaction to area in chest and up only    Codeine Itching     Objective:     Vital Signs (Most Recent):  Temp: 98.4 °F (36.9 °C) (10/25/19 0824)  Pulse: 90 (10/25/19 0824)  Resp: 17 (10/25/19 0824)  BP: (!) 98/0 (10/25/19 0824)  SpO2: 95 % (10/25/19 0824) Vital Signs (24h Range):  Temp:  [97.6 °F (36.4 °C)-98.6 °F (37 °C)] 98.4 °F (36.9 °C)  Pulse:  [] 90  Resp:  [17-18] 17  SpO2:  [95 %-100 %] 95 %  BP: ()/(0-75) 98/0     Patient Vitals for the past 72 hrs (Last 3 readings):   Weight   10/25/19 0433 102.2 kg (225 lb 5 oz)     Body mass index is 35.29 kg/m².      Intake/Output Summary (Last 24 hours) at 10/25/2019 1141  Last data filed at 10/25/2019 0500  Gross per 24 hour   Intake 180 ml   Output 300 ml   Net -120 ml       Hemodynamic Parameters:       Telemetry: reviewed    Physical Exam   Constitutional: She  is oriented to person, place, and time. She appears well-developed and well-nourished.   HENT:   Head: Normocephalic and atraumatic.   Eyes: Pupils are equal, round, and reactive to light. EOM are normal.   Neck: Normal range of motion. Neck supple. No JVD present.   Cardiovascular: Normal rate and regular rhythm.   vad hum smooth   Pulmonary/Chest: Effort normal and breath sounds normal. No stridor. No respiratory distress.   Abdominal: Soft. Bowel sounds are normal. She exhibits no distension. There is no tenderness.   Musculoskeletal: Normal range of motion. She exhibits no edema.   Neurological: She is alert and oriented to person, place, and time.   Skin: Skin is warm and dry. Capillary refill takes 2 to 3 seconds.   Psychiatric: She has a normal mood and affect. Her behavior is normal. Judgment and thought content normal.   Nursing note and vitals reviewed.      Significant Labs:  CBC:  Recent Labs   Lab 10/21/19  0712 10/22/19  0408 10/25/19  0403   WBC 5.09 4.73 5.39   RBC 4.27 4.08 3.91*   HGB 11.0* 10.5* 10.1*   HCT 37.2 36.9* 33.3*    265 289   MCV 87 90 85   MCH 25.8* 25.7* 25.8*   MCHC 29.6* 28.5* 30.3*     BNP:  Recent Labs   Lab 10/21/19  0049 10/21/19  0712 10/25/19  0402   * 276* 164*     CMP:  Recent Labs   Lab 10/21/19  0712  10/22/19  0408 10/24/19  2037 10/25/19  0403     --  116* 106 118*   CALCIUM 9.6  --  9.4 9.3 9.3   ALBUMIN 3.4*  --   --   --  3.1*   PROT 7.2  --   --   --  6.7     --  140 137 139   K 3.6   < > 4.8 4.0 3.6   CO2 27  --  24 24 24     --  107 103 104   BUN 18  --  22* 20 21*   CREATININE 1.3  --  1.4 1.4 1.4   ALKPHOS 88  --   --   --  83   ALT 17  --   --   --  13   AST 21  --   --   --  16   BILITOT 0.7  --   --   --  0.4    < > = values in this interval not displayed.      Coagulation:   Recent Labs   Lab 10/21/19  0712 10/22/19  0408 10/24/19  2037 10/25/19  0402   INR 3.1* 3.0* 2.3* 2.3*   APTT 37.5* 38.9*  --  37.3*     LDH:  Recent  Labs   Lab 10/25/19  0403        Microbiology:  Microbiology Results (last 7 days)     ** No results found for the last 168 hours. **          I have reviewed all pertinent labs within the past 24 hours.    Estimated Creatinine Clearance: 59 mL/min (based on SCr of 1.4 mg/dL).    Diagnostic Results:  I have reviewed and interpreted all pertinent imaging results/findings within the past 24 hours.    Assessment and Plan:     48 yo WF with NICM, s/p HM3 implantation 9/10/19 (post up coarse uncomplicated with the exception of+ diaphragmatic stimulation of LV lead and pleural effusion with chest tube placement, atrial flutter with NADINE/DCCV), V-fib reported in setting of hypokalemia with appropriate shock from ICD on Amiodarone prior to LVAD placement. Transferred from outside hospital for ICD shock x2 at home.     Recently admitted from 10/20-10/22 also for ICD shock. EP was consulted who deemed her ICD shock appropriate for MMVT. Patient was IV loaded with amiodarone and transitioned to PO prior to discharge. Lasix was decreased from QD to EOD due to concerns for hypovolemia. Her driveline was cultured due to pain and minimal drainage, no growth on cultures.     On 10/23, she was sitting down watching TV when she suddenly felt a jumping sensation in her left lower chest followed by a shock. Denies LOC, dizziness or palpitations prior to this episode. After the shock she felt slightly nauseous. She called the clinic and was advised to go to ER if she had a second shock. Today she had a similar episode around 12 pm and went to her local ED. Otherwise doing well since discharged on Tuesday.     * Ventricular tachycardia  - Patient recently discharged after an episode of VT required ICD shock.   - Now readmitted after two ICD shocks. Per admission -ICD interrogation showed an episode of VT on 10/23 at 8:57 pm and one episode of VT->Vf on 10/24 at 12:38 pm.   - Electrolytes WNL at time of event. Bedside echo with LV  unchanged in size (no collapse).   - Loaded with IV amio, continue for now. Mexitil started overnight at 150 mg TID.  - Will consult EP in AM  - Will get RHC in effort to pursue listing patient for transplant in case VT continues to be an issue.     LVAD (left ventricular assist device) present  -HeartMate 3 Implanted 9/10/19 as DT.  -Implanted by Dr. Walden  -INR 2.3 today. Further adjustments per pharmacy. Goal INR 2.0-3.0.   -Antiplatelets:  mg.  -LDH stable  -Speed set at 5200.  -Not listed for OHTx: was declined due to pulmonary hypertension. Will get RHC Monday if patient amenable in effort to move forward with listing.        Procedure: Device Interrogation Including analysis of device parameters  Current Settings: Ventricular Assist Device  Review of device function is stable    TXP LVAD INTERROGATIONS 10/25/2019 10/25/2019 10/25/2019 10/24/2019 10/22/2019 10/22/2019 10/22/2019   Type HeartMate3 HeartMate3 HeartMate3 HeartMate3 HeartMate3 HeartMate3 HeartMate3   Flow 3.1 3.1 3.4 3.5 3.6 3.1 3.4   Speed 5200 5200 5200 5200 5200 5250 5250   PI 5.6 7.1 4.4 4.4 4.5 5.9 4.9   Power ([place]) 3.5 3.6 3.5 3.6 3.5 3.5 3.5   LSL 4800 - - 4800 4800 - -   Pulsatility No Pulse - - Pulse Pulse - -       Essential hypertension  - Continue lisinopril and spironolactone.   - Goal - doppler 60-90 mmHg    Chronic systolic congestive heart failure  -Euvolemic on exam. Takes furosemide every other day. Held on admit  -Continue lisinopril and spironolactone.    Ventricular fibrillation  - see VT        Adry Cruz PA-C  Heart Transplant  [facility]

## 2019-10-25 NOTE — CONSULTS
[facility]  Cardiac Electrophysiology  Consult Note    Admission Date: 10/24/2019  Code Status: Full Code   Attending Provider: Eric Antunez Jr.,*  Consulting Provider: Luly Chau MD  Principal Problem:Ventricular tachycardia    Inpatient consult to Electrophysiology  Consult performed by: Luly Chau MD  Consult ordered by: Alphonse Valdes MD        Subjective:     Chief Complaint:  ICD Shocks    HPI:   Deborah Navas is a 49 y.o. F with NICM s/p HM3 implantation 9/10/19 (uncomplicated with the exception of+ diaphragmatic stimulation of LV lead and pleural effusion with chest tube placement, atrial flutter with NADINE/DCCV) who was transferred from outside hospital for ICD shock x2 at home. She report first shock on 10/23 as she was sitting down watching TV when she suddenly felt a jumping sensation in her left lower chest followed by a shock. She didn't have any presyncopal episodes but felt nauseous afterwards. She called our clinic and was advised to present to our ER if she had another episode. Pt reports a second ICD shock on 10/24 around 12 pm also at rest. She presented to her local ER, was bolused with IV amiodarone, started on gtt and transferred here for further evaluation and management.    Pt was recently admitted 10/20-10/22 for ICD shock. This was her first ICD shock since she received her LVAD in September. During this admission, she was loaded with and started on amiodarone, lasix was decreased from daily to every other day due ot concern for hypovolemia. LV lead was turned off due to concern for diaphragmatic stimulation.     Interrogation:   Medtronic CRT-D (LV lead off)  Battery remainin.6 years  Mode DDD   Base rate: 60 bpm   Upper trackin bpm  Mode switch 171 bpm    Threshold: A lead:P 0.75 V at 0.40 ms, RV lead:  0.625 @ 0.40 ms    Zones   Detection  Therapy  AT/AF  Monitor > 171 bpm Burst+, ramp, CV off  VF  ON > 222 bpm ATP before charging 35J  x 6  FVT   Via -222 bpm Ramp (2), 25J, 35J x4  VT  On 146-222 bpm Burst (3), Ramp (3), Burst (3), Ramp (2), Ramp (2), Ramp (2)      Episodes (since Oct 21 2019): 5 (3 pace-terminated episodes, 2 shock terminated episodes)  Episode #1 10/21/19 15:10 19 sec duration at 222 bpm terminated with ATP x 2   Episode #2 10/21/19 15:25 16 sec duration at 194bpm terminated with ATP x 2   Episode #3 10/21/19 16:26 10 sec duration at 194 bpm terminated with ATP x 1   Episode #4 10/23/19 20:57 27 sec duration at 194 bpm terminated with ATP x 2, 25J   Episode #5 10/24/19 12:37 29 sec duration at 222 bpm terminated with ATP x 3, 35J     % pacing (since 10/21/19):AS- 96.9%, AP- 1.5%, AS-VS 1.5%, AP-VS < 0.1%      Past Medical History:   Diagnosis Date    Fractures     History of ventricular fibrillation 9/4/2019    History of ventricular tachycardia 9/4/2019    Hyperlipidemia     Migraine     Osteoarthritis        Past Surgical History:   Procedure Laterality Date    BACK SURGERY      2007    BONE GRAFT Left     from Left hip to Left FA    eardrum reconstruction  1980    ELBOW SURGERY Left [id number]    FOREARM FRACTURE SURGERY Bilateral [id number]    multiple surgeries    INSERTION OF GRAFT TO PERICARDIUM  9/10/2019    Procedure: INSERTION, GRAFT, PERICARDIUM;  Surgeon: Shar Walden MD;  Location: [facility] OR [address] [place] [address];  Service: Cardiovascular;;    INSERTION OF IMPLANTABLE CARDIOVERTER-DEFIBRILLATOR (ICD) GENERATOR WITH TWO EXISTING LEADS      INSERTION OF PACEMAKER Left     LEFT VENTRICULAR ASSIST DEVICE N/A 9/10/2019    Procedure: INSERTION-LEFT VENTRICULAR ASSIST DEVICE;  Surgeon: Shar Walden MD;  Location: [facility] OR [address] [place] [address];  Service: Cardiovascular;  Laterality: N/A;  DT HM3     LUMBAR FUSION  2007    L4-L5    RIGHT HEART CATHETERIZATION Right 9/4/2019    Procedure: INSERTION, CATHETER, RIGHT HEART;  Surgeon: Vi Bryant MD;  Location: [facility] CATH LAB;  Service: Cardiology;  Laterality: Right;     SINUS SURGERY Right 1994    with lymph nodes    STERNAL WOUND CLOSURE  9/10/2019    Procedure: CLOSURE, WOUND, STERNUM;  Surgeon: Shar Walden MD;  [facility];  Service: Cardiovascular;;    TEMPOROMANDIBULAR JOINT SURGERY Right 1988    TONSILLECTOMY      TREATMENT OF CARDIAC ARRHYTHMIA N/A 9/24/2019    Procedure: CARDIOVERSION;  Surgeon: Moe Barrera MD;  Location: [facility] EP LAB;  Service: Cardiology;  Laterality: N/A;  AF, DCCV/NADINE, anes, DM, Rm 3073    TYMPANOSTOMY TUBE PLACEMENT  1971- 1979    multiple tube placements       Review of patient's allergies indicates:   Allergen Reactions    Adhesive Blisters     Reaction to area in chest and up only    Codeine Itching       No current facility-administered medications on file prior to encounter.      Current Outpatient Medications on File Prior to Encounter   Medication Sig    amiodarone (PACERONE) 200 MG Tab Take 2 tablets (400mg) by mouth twice daily for 14 days then take 1 tablet (400mg) by mouth daily    aspirin (ECOTRIN) 325 MG EC tablet Take 1 tablet (325 mg total) by mouth once daily.    furosemide (LASIX) 20 MG tablet Take 20mg orally every other day starting 10.23    gabapentin (NEURONTIN) 400 MG capsule Take 1 capsule (400 mg total) by mouth 2 (two) times daily.    lisinopril (PRINIVIL,ZESTRIL) 5 MG tablet Take 1 tablet (5 mg total) by mouth 2 (two) times daily.    mirtazapine (REMERON) 15 MG tablet Take 1 tablet (15 mg total) by mouth every evening.    oxyCODONE-acetaminophen (PERCOCET) 5-325 mg per tablet Take 1 tablet by mouth once daily.    pantoprazole (PROTONIX) 40 MG tablet Take 1 tablet (40 mg total) by mouth once daily.    senna-docusate 8.6-50 mg (PERICOLACE) 8.6-50 mg per tablet Take 2 tablets by mouth 2 (two) times daily as needed for Constipation.    spironolactone (ALDACTONE) 25 MG tablet Take 1 tablet (25 mg total) by mouth once daily.    VENLAFAXINE HCL (EFFEXOR ORAL) Take 150 mg by mouth once daily.     warfarin (COUMADIN) 3 MG tablet Take 1.5mg orally daily every Mon/Wed/Fri and 3mg orally on all other days     Family History     Problem Relation (Age of Onset)    Broken bones Maternal Grandmother    Cancer Paternal Grandmother    Dislocations Maternal Grandmother    Heart failure Paternal Grandfather, Maternal Grandfather    Migraines Mother, Maternal Grandmother, Paternal Grandmother, Paternal Grandfather, Maternal Grandfather    Obesity Paternal Grandmother    Osteoarthritis Mother, Maternal Grandmother, Paternal Grandmother, Paternal Grandfather, Maternal Grandfather, Father    Osteoporosis Maternal Grandmother    Scoliosis Maternal Grandmother    Stroke Father        Tobacco Use    Smoking status: Never Smoker    Smokeless tobacco: Never Used   Substance and Sexual Activity    Alcohol use: No    Drug use: No    Sexual activity: Not Currently     Review of Systems   Constitution: Negative for chills, fever and weight gain.   HENT: Negative for congestion.    Eyes: Negative for visual disturbance.   Cardiovascular: Negative for chest pain, claudication, dyspnea on exertion, leg swelling, orthopnea, palpitations and syncope.   Respiratory: Negative for cough, shortness of breath and snoring.    Hematologic/Lymphatic: Does not bruise/bleed easily.   Skin: Negative for rash.   Musculoskeletal: Negative for muscle cramps and myalgias.   Gastrointestinal: Negative for bloating, abdominal pain, constipation, diarrhea and melena.   Genitourinary: Negative for bladder incontinence.   Neurological: Negative for excessive daytime sleepiness, focal weakness and weakness.   Psychiatric/Behavioral: Negative for depression and suicidal ideas.     Objective:     Vital Signs (Most Recent):  Temp: 98.4 °F (36.9 °C) (10/25/19 0824)  Pulse: 97 (10/25/19 0433)  Resp: 18 (10/25/19 0433)  BP: (!) 98/0 (10/25/19 0824)  SpO2: 95 % (10/25/19 0824) Vital Signs (24h Range):  Temp:  [97.6 °F (36.4 °C)-98.6 °F (37 °C)] 98.4 °F (36.9  °C)  Pulse:  [] 97  Resp:  [18] 18  SpO2:  [95 %-100 %] 95 %  BP: ()/(0-75) 98/0       Weight: 102.2 kg (225 lb 5 oz)  Body mass index is 35.29 kg/m².    SpO2: 95 %  O2 Device (Oxygen Therapy): room air    Physical Exam   Constitutional: She is oriented to person, place, and time. She appears well-developed and well-nourished.   HENT:   Head: Normocephalic and atraumatic.   Eyes: Pupils are equal, round, and reactive to light.   Neck: No JVD present.   Cardiovascular: Normal rate and regular rhythm.   No murmur heard.  Pulmonary/Chest: Effort normal and breath sounds normal. No respiratory distress.   Abdominal: Soft. Bowel sounds are normal. She exhibits no distension. There is no tenderness.   Musculoskeletal: She exhibits no edema.   Neurological: She is alert and oriented to person, place, and time.   Skin: Skin is warm and dry. No erythema.   Psychiatric: Her behavior is normal. Judgment and thought content normal.   Vitals reviewed.      Significant Labs:   CMP:   Recent Labs   Lab 10/24/19  2037 10/25/19  0403    139   K 4.0 3.6    104   CO2 24 24    118*   BUN 20 21*   CREATININE 1.4 1.4   CALCIUM 9.3 9.3   PROT  --  6.7   ALBUMIN  --  3.1*   BILITOT  --  0.4   ALKPHOS  --  83   AST  --  16   ALT  --  13   ANIONGAP 10 11   ESTGFRAFRICA 50.5* 50.5*   EGFRNONAA 43.8* 43.8*    and CBC:   Recent Labs   Lab 10/25/19  0403   WBC 5.39   HGB 10.1*   HCT 33.3*          Significant Imaging: n/a    Assessment and Plan:     * Ventricular tachycardia  Deborah Navas is a 50 y/o F with HFrEF (EF 20%) s/p HM3 implantation 9/10/19 (post up coarse uncomplicated with the exception of+ diaphragmatic stimulation of LV lead and pleural effusion with chest tube placement, atrial flutter with NADINE/DCCV), V-fib reported in setting of hypokalemia with appropriate shock from ICD on Amiodarone prior to LVAD placement, who was transferred from [facility] for an ICD shock.   -  dual chamber Medtronic ICD placed Feb 2017 by Dr. Bose at [facility] for primary prevention   - MMVT of 3 different morphologies. Etiology: NICM  - no reversible etiologies noted (euvolemic, no active infection, electrolytes wnl)  - s/p IV load of amio -> would switch to PO amiodarone 400mg BID and agree with starting mexilitene 150 mg TID   - continue tele monitoring  - ensure K > 4, Mg >2    Thank you for your consult. I will follow-up with patient. Please contact us if you have any additional questions.     Patient seen and plan of care discussed with Dr. Yoo.    Luly Chau MD  Cardiac Electrophysiology  [facility] [address]

## 2019-10-25 NOTE — SUBJECTIVE & OBJECTIVE
Interval History: Patient without complaints. Mexitil started overnight. EP consult placed.     Continuous Infusions:   amiodarone in dextrose 5% 1 mg/min (10/25/19 1032)     Scheduled Meds:   aspirin  325 mg Oral Daily    gabapentin  400 mg Oral BID    lisinopril  5 mg Oral BID    magnesium oxide  400 mg Oral Daily    mexiletine  150 mg Oral Q8H    mirtazapine  15 mg Oral QHS    pantoprazole  40 mg Oral Daily    spironolactone  25 mg Oral Daily    venlafaxine  150 mg Oral Daily    [START ON 10/26/2019] warfarin  3 mg Oral Every Tues, Thurs, Sat, Sun    warfarin  1.5 mg Oral Every Mon, Wed, Fri     PRN Meds:ondansetron, oxyCODONE-acetaminophen, senna-docusate 8.6-50 mg    Review of patient's allergies indicates:   Allergen Reactions    Adhesive Blisters     Reaction to area in chest and up only    Codeine Itching     Objective:     Vital Signs (Most Recent):  Temp: 98.4 °F (36.9 °C) (10/25/19 0824)  Pulse: 90 (10/25/19 0824)  Resp: 17 (10/25/19 0824)  BP: (!) 98/0 (10/25/19 0824)  SpO2: 95 % (10/25/19 0824) Vital Signs (24h Range):  Temp:  [97.6 °F (36.4 °C)-98.6 °F (37 °C)] 98.4 °F (36.9 °C)  Pulse:  [] 90  Resp:  [17-18] 17  SpO2:  [95 %-100 %] 95 %  BP: ()/(0-75) 98/0     Patient Vitals for the past 72 hrs (Last 3 readings):   Weight   10/25/19 0433 102.2 kg (225 lb 5 oz)     Body mass index is 35.29 kg/m².      Intake/Output Summary (Last 24 hours) at 10/25/2019 1141  Last data filed at 10/25/2019 0500  Gross per 24 hour   Intake 180 ml   Output 300 ml   Net -120 ml       Hemodynamic Parameters:       Telemetry: reviewed    Physical Exam   Constitutional: She is oriented to person, place, and time. She appears well-developed and well-nourished.   HENT:   Head: Normocephalic and atraumatic.   Eyes: Pupils are equal, round, and reactive to light. EOM are normal.   Neck: Normal range of motion. Neck supple. No JVD present.   Cardiovascular: Normal rate and regular rhythm.   vad hum smooth    Pulmonary/Chest: Effort normal and breath sounds normal. No stridor. No respiratory distress.   Abdominal: Soft. Bowel sounds are normal. She exhibits no distension. There is no tenderness.   Musculoskeletal: Normal range of motion. She exhibits no edema.   Neurological: She is alert and oriented to person, place, and time.   Skin: Skin is warm and dry. Capillary refill takes 2 to 3 seconds.   Psychiatric: She has a normal mood and affect. Her behavior is normal. Judgment and thought content normal.   Nursing note and vitals reviewed.      Significant Labs:  CBC:  Recent Labs   Lab 10/21/19  0712 10/22/19  0408 10/25/19  0403   WBC 5.09 4.73 5.39   RBC 4.27 4.08 3.91*   HGB 11.0* 10.5* 10.1*   HCT 37.2 36.9* 33.3*    265 289   MCV 87 90 85   MCH 25.8* 25.7* 25.8*   MCHC 29.6* 28.5* 30.3*     BNP:  Recent Labs   Lab 10/21/19  0049 10/21/19  0712 10/25/19  0402   * 276* 164*     CMP:  Recent Labs   Lab 10/21/19  0712  10/22/19  0408 10/24/19  2037 10/25/19  0403     --  116* 106 118*   CALCIUM 9.6  --  9.4 9.3 9.3   ALBUMIN 3.4*  --   --   --  3.1*   PROT 7.2  --   --   --  6.7     --  140 137 139   K 3.6   < > 4.8 4.0 3.6   CO2 27  --  24 24 24     --  107 103 104   BUN 18  --  22* 20 21*   CREATININE 1.3  --  1.4 1.4 1.4   ALKPHOS 88  --   --   --  83   ALT 17  --   --   --  13   AST 21  --   --   --  16   BILITOT 0.7  --   --   --  0.4    < > = values in this interval not displayed.      Coagulation:   Recent Labs   Lab 10/21/19  0712 10/22/19  0408 10/24/19  2037 10/25/19  0402   INR 3.1* 3.0* 2.3* 2.3*   APTT 37.5* 38.9*  --  37.3*     LDH:  Recent Labs   Lab 10/25/19  0403        Microbiology:  Microbiology Results (last 7 days)     ** No results found for the last 168 hours. **          I have reviewed all pertinent labs within the past 24 hours.    Estimated Creatinine Clearance: 59 mL/min (based on SCr of 1.4 mg/dL).    Diagnostic Results:  I have reviewed and  interpreted all pertinent imaging results/findings within the past 24 hours.

## 2019-10-25 NOTE — ASSESSMENT & PLAN NOTE
-Euvolemic on exam. Takes furosemide every other day. Held on admit  -Continue lisinopril and spironolactone.

## 2019-10-25 NOTE — H&P
[facility]  Heart Transplant  H&P    Patient Name: Deborah Navas  MRN: [id number]  Admission Date: 10/24/2019  Attending Physician: Eric Antunez Jr.,*  Primary Care Provider: Primary Doctor No  Principal Problem:Ventricular tachycardia    Subjective:     History of Present Illness:  48 yo WF with NICM, s/p HM3 implantation 9/10/19 (post up coarse uncomplicated with the exception of+ diaphragmatic stimulation of LV lead and pleural effusion with chest tube placement, atrial flutter with ANDINE/DCCV), V-fib reported in setting of hypokalemia with appropriate shock from ICD on Amiodarone prior to LVAD placement. Transferred from outside hospital for ICD shock x2 at home.     Recently admitted from 10/20-10/22 also for ICD shock. EP was consulted who deemed her ICD shock appropriate for MMVT. Patient was IV loaded with amiodarone and transitioned to PO prior to discharge. Lasix was decreased from QD to EOD due to concerns for hypovolemia. Her driveline was cultured due to pain and minimal drainage, no growth on cultures.     On 10/23, she was sitting down watching TV when she suddenly felt a jumping sensation in her left lower chest followed by a shock. Denies LOC, dizziness or palpitations prior to this episode. After the shock she felt slightly nauseous. She called the clinic and was advised to go to ER if she had a second shock. Today she had a similar episode around 12 pm and went to her local ED. Otherwise doing well since discharged on Tuesday.     Past Medical History:   Diagnosis Date    Fractures     History of ventricular fibrillation 9/4/2019    History of ventricular tachycardia 9/4/2019    Hyperlipidemia     Migraine     Osteoarthritis        Past Surgical History:   Procedure Laterality Date    BACK SURGERY      2007    BONE GRAFT Left     from Left hip to Left FA    eardrum reconstruction  1980    ELBOW SURGERY Left [id number]    FOREARM FRACTURE SURGERY Bilateral  [id number]    multiple surgeries    INSERTION OF GRAFT TO PERICARDIUM  9/10/2019    Procedure: INSERTION, GRAFT, PERICARDIUM;  Surgeon: Shar Walden MD;  Location: [facility] OR [address] [place] [address];  Service: Cardiovascular;;    INSERTION OF IMPLANTABLE CARDIOVERTER-DEFIBRILLATOR (ICD) GENERATOR WITH TWO EXISTING LEADS      INSERTION OF PACEMAKER Left     LEFT VENTRICULAR ASSIST DEVICE N/A 9/10/2019    Procedure: INSERTION-LEFT VENTRICULAR ASSIST DEVICE;  Surgeon: Shar Walden MD;  Location: [facility] OR [address] [place] [address];  Service: Cardiovascular;  Laterality: N/A;  DT HM3     LUMBAR FUSION  2007    L4-L5    RIGHT HEART CATHETERIZATION Right 9/4/2019    Procedure: INSERTION, CATHETER, RIGHT HEART;  Surgeon: Vi Bryant MD;  Location: [facility] CATH LAB;  Service: Cardiology;  Laterality: Right;    SINUS SURGERY Right 1994    with lymph nodes    STERNAL WOUND CLOSURE  9/10/2019    Procedure: CLOSURE, WOUND, STERNUM;  Surgeon: Shar Walden MD;  [facility];  Service: Cardiovascular;;    TEMPOROMANDIBULAR JOINT SURGERY Right 1988    TONSILLECTOMY      TREATMENT OF CARDIAC ARRHYTHMIA N/A 9/24/2019    Procedure: CARDIOVERSION;  Surgeon: Moe Barrera MD;  Location: [facility] EP LAB;  Service: Cardiology;  Laterality: N/A;  AF, DCCV/NADINE, anes, DM, Rm 3073    TYMPANOSTOMY TUBE PLACEMENT  1971- 1979    multiple tube placements       Review of patient's allergies indicates:   Allergen Reactions    Adhesive Blisters     Reaction to area in chest and up only    Codeine Itching       Current Facility-Administered Medications   Medication    amiodarone 360 mg/200 mL (1.8 mg/mL) infusion    [START ON 10/25/2019] aspirin EC tablet 325 mg    gabapentin capsule 400 mg    lisinopril tablet 5 mg    mexiletine capsule 150 mg    mirtazapine tablet 15 mg    oxyCODONE-acetaminophen 5-325 mg per tablet 1 tablet    [START ON 10/25/2019] pantoprazole EC tablet 40 mg    senna-docusate 8.6-50 mg per tablet 2 tablet    [START ON  10/25/2019] spironolactone tablet 25 mg    [START ON 10/25/2019] venlafaxine tablet 150 mg    [START ON 10/26/2019] warfarin (COUMADIN) tablet 3 mg    [START ON 10/25/2019] warfarin split tablet 1.5 mg     Family History     Problem Relation (Age of Onset)    Broken bones Maternal Grandmother    Cancer Paternal Grandmother    Dislocations Maternal Grandmother    Heart failure Paternal Grandfather, Maternal Grandfather    Migraines Mother, Maternal Grandmother, Paternal Grandmother, Paternal Grandfather, Maternal Grandfather    Obesity Paternal Grandmother    Osteoarthritis Mother, Maternal Grandmother, Paternal Grandmother, Paternal Grandfather, Maternal Grandfather, Father    Osteoporosis Maternal Grandmother    Scoliosis Maternal Grandmother    Stroke Father        Tobacco Use    Smoking status: Never Smoker    Smokeless tobacco: Never Used   Substance and Sexual Activity    Alcohol use: No    Drug use: No    Sexual activity: Not Currently     Review of Systems   Constitutional: Negative for chills, fatigue and fever.   Respiratory: Negative for cough, chest tightness and shortness of breath.    Cardiovascular: Negative for chest pain, palpitations and leg swelling.   Gastrointestinal: Negative for abdominal distention, abdominal pain, diarrhea, nausea and vomiting.   Genitourinary: Negative for dysuria.   Musculoskeletal: Negative for back pain.   Neurological: Negative for light-headedness and headaches.     Objective:     Vital Signs (Most Recent):  Temp: 97.6 °F (36.4 °C) (10/24/19 1915)  Resp: 18 (10/24/19 1915)  BP: 99/75 (10/24/19 1915)  SpO2: 100 % (10/24/19 1915) Vital Signs (24h Range):  Temp:  [97.6 °F (36.4 °C)-98.3 °F (36.8 °C)] 97.6 °F (36.4 °C)  Pulse:  [72-97] 97  Resp:  [18] 18  SpO2:  [98 %-100 %] 100 %  BP: (92-99)/(67-75) 99/75     No data found.  There is no height or weight on file to calculate BMI.      Intake/Output Summary (Last 24 hours) at 10/24/2019 5045  Last data filed at  10/24/2019 2200  Gross per 24 hour   Intake 180 ml   Output 300 ml   Net -120 ml       Physical Exam   Constitutional: She is oriented to person, place, and time. She appears well-developed and well-nourished.   HENT:   Head: Normocephalic and atraumatic.   Eyes: EOM are normal.   Neck: Normal range of motion. Neck supple. No JVD present.   Cardiovascular:   VAD hum   Pulmonary/Chest: Effort normal and breath sounds normal. No respiratory distress.   Abdominal: Soft. Bowel sounds are normal. She exhibits no distension.   Musculoskeletal: Normal range of motion. She exhibits no edema.   Neurological: She is alert and oriented to person, place, and time.       Significant Labs:  CBC:  Recent Labs   Lab 10/21/19  0712 10/22/19  0408   WBC 5.09 4.73   RBC 4.27 4.08   HGB 11.0* 10.5*   HCT 37.2 36.9*    265   MCV 87 90   MCH 25.8* 25.7*   MCHC 29.6* 28.5*     BNP:  Recent Labs   Lab 10/21/19  0049 10/21/19  0712   * 276*     CMP:  Recent Labs   Lab 10/21/19  0712 10/21/19  1531 10/22/19  0408 10/24/19  2037     --  116* 106   CALCIUM 9.6  --  9.4 9.3   ALBUMIN 3.4*  --   --   --    PROT 7.2  --   --   --      --  140 137   K 3.6 3.8 4.8 4.0   CO2 27  --  24 24     --  107 103   BUN 18  --  22* 20   CREATININE 1.3  --  1.4 1.4   ALKPHOS 88  --   --   --    ALT 17  --   --   --    AST 21  --   --   --    BILITOT 0.7  --   --   --       Coagulation:   Recent Labs   Lab 10/21/19  0712 10/22/19  0408 10/24/19  2037   INR 3.1* 3.0* 2.3*   APTT 37.5* 38.9*  --      LDH:  Recent Labs   Lab 10/22/19  0408        Microbiology:  Microbiology Results (last 7 days)     ** No results found for the last 168 hours. **          I have reviewed all pertinent labs within the past 24 hours.    Diagnostic Results:  I have reviewed all pertinent imaging results/findings within the past 24 hours.    Assessment/Plan:     * Ventricular tachycardia  - Patient recently discharged after an episode of VT  required ICD shock. Now admitted after two ICD shocks. Interrogation showed an episode of VT on 10/23 at 8:57 pm and one episode of VT->Vf on 10/24 at 12:38 pm.   - Electrolytes WNL. Bedside echo with LV unchanged in size (no collapse).   - Continue amiodarone infusion 1 mg/min for now. Will start mexiletine 150 mg TID  - Will consult EP in AM    Essential hypertension  - Continue lisinopril and spironolactone. Consider beta blocker.  - Goal - doppler 60-90 mmHg    LVAD (left ventricular assist device) present  -HeartMate 3 Implanted 9/10/19 as DT.  -Implanted by Dr. Walden  -INR 2.3 today. Will give 3 mg tonight as per home dosage. Further adjustments per pharmacy. Goal INR 2.0-3.0.   -Antiplatelets:  mg.  -Check LDH in AM.   -Speed set at 5200.  -Not listed for OHTx: was declined due to pulmonary hypertension and incomplete care giving plan       Procedure: Device Interrogation Including analysis of device parameters  Current Settings: Ventricular Assist Device  Review of device function is stable    TXP LVAD INTERROGATIONS 10/22/2019 10/22/2019 10/22/2019 10/21/2019 10/21/2019 10/21/2019 10/21/2019   Type HeartMate3 HeartMate3 HeartMate3 HeartMate3 HeartMate3 HeartMate3 HeartMate3   Flow 3.6 3.1 3.4 4.0 3.5 3.5 3.7   Speed 5200 5250 5250 5200 5250 5200 5200   PI 4.5 5.9 4.9 3.7 4.2 4.1 4.2   Power ([place]) 3.5 3.5 3.5 3.6 3.7 3.6 3.6   LSL 4800 - - 4800 - - -   Pulsatility Pulse - - Intermittent pulse - - -       Chronic systolic congestive heart failure  - Euvolemic on exam. Takes furosemie every other day. Continue lisinopril and spironolactone.    Ventricular fibrillation  - see VT      Alphonse Valdes MD  Heart Transplant  [facility]

## 2019-10-25 NOTE — SUBJECTIVE & OBJECTIVE
Past Medical History:   Diagnosis Date    Fractures     History of ventricular fibrillation 9/4/2019    History of ventricular tachycardia 9/4/2019    Hyperlipidemia     Migraine     Osteoarthritis        Past Surgical History:   Procedure Laterality Date    BACK SURGERY      2007    BONE GRAFT Left     from Left hip to Left FA    eardrum reconstruction  1980    ELBOW SURGERY Left [id number]    FOREARM FRACTURE SURGERY Bilateral [id number]    multiple surgeries    INSERTION OF GRAFT TO PERICARDIUM  9/10/2019    Procedure: INSERTION, GRAFT, PERICARDIUM;  Surgeon: Shar Walden MD;  Location: [facility] OR [address] [place] [address];  Service: Cardiovascular;;    INSERTION OF IMPLANTABLE CARDIOVERTER-DEFIBRILLATOR (ICD) GENERATOR WITH TWO EXISTING LEADS      INSERTION OF PACEMAKER Left     LEFT VENTRICULAR ASSIST DEVICE N/A 9/10/2019    Procedure: INSERTION-LEFT VENTRICULAR ASSIST DEVICE;  Surgeon: Shar Walden MD;  Location: [facility] OR [address] [place] [address];  Service: Cardiovascular;  Laterality: N/A;  DT HM3     LUMBAR FUSION  2007    L4-L5    RIGHT HEART CATHETERIZATION Right 9/4/2019    Procedure: INSERTION, CATHETER, RIGHT HEART;  Surgeon: Vi Bryant MD;  Location: [facility] CATH LAB;  Service: Cardiology;  Laterality: Right;    SINUS SURGERY Right 1994    with lymph nodes    STERNAL WOUND CLOSURE  9/10/2019    Procedure: CLOSURE, WOUND, STERNUM;  Surgeon: Shar Walden MD;  Location: [facility] OR [address] [place] [address];  Service: Cardiovascular;;    TEMPOROMANDIBULAR JOINT SURGERY Right 1988    TONSILLECTOMY      TREATMENT OF CARDIAC ARRHYTHMIA N/A 9/24/2019    Procedure: CARDIOVERSION;  Surgeon: Moe Barrera MD;  Location: [facility] EP LAB;  Service: Cardiology;  Laterality: N/A;  AF, DCCV/NADINE, anes, DM, Rm 3073    TYMPANOSTOMY TUBE PLACEMENT  1971- 1979    multiple tube placements       Review of patient's allergies indicates:   Allergen Reactions    Adhesive Blisters     Reaction to area in chest and up only    Codeine Itching       Current  Facility-Administered Medications   Medication    amiodarone 360 mg/200 mL (1.8 mg/mL) infusion    [START ON 10/25/2019] aspirin EC tablet 325 mg    gabapentin capsule 400 mg    lisinopril tablet 5 mg    mexiletine capsule 150 mg    mirtazapine tablet 15 mg    oxyCODONE-acetaminophen 5-325 mg per tablet 1 tablet    [START ON 10/25/2019] pantoprazole EC tablet 40 mg    senna-docusate 8.6-50 mg per tablet 2 tablet    [START ON 10/25/2019] spironolactone tablet 25 mg    [START ON 10/25/2019] venlafaxine tablet 150 mg    [START ON 10/26/2019] warfarin (COUMADIN) tablet 3 mg    [START ON 10/25/2019] warfarin split tablet 1.5 mg     Family History     Problem Relation (Age of Onset)    Broken bones Maternal Grandmother    Cancer Paternal Grandmother    Dislocations Maternal Grandmother    Heart failure Paternal Grandfather, Maternal Grandfather    Migraines Mother, Maternal Grandmother, Paternal Grandmother, Paternal Grandfather, Maternal Grandfather    Obesity Paternal Grandmother    Osteoarthritis Mother, Maternal Grandmother, Paternal Grandmother, Paternal Grandfather, Maternal Grandfather, Father    Osteoporosis Maternal Grandmother    Scoliosis Maternal Grandmother    Stroke Father        Tobacco Use    Smoking status: Never Smoker    Smokeless tobacco: Never Used   Substance and Sexual Activity    Alcohol use: No    Drug use: No    Sexual activity: Not Currently     Review of Systems   Constitutional: Negative for chills, fatigue and fever.   Respiratory: Negative for cough, chest tightness and shortness of breath.    Cardiovascular: Negative for chest pain, palpitations and leg swelling.   Gastrointestinal: Negative for abdominal distention, abdominal pain, diarrhea, nausea and vomiting.   Genitourinary: Negative for dysuria.   Musculoskeletal: Negative for back pain.   Neurological: Negative for light-headedness and headaches.     Objective:     Vital Signs (Most Recent):  Temp: 97.6 °F (36.4 °C)  (10/24/19 1915)  Resp: 18 (10/24/19 1915)  BP: 99/75 (10/24/19 1915)  SpO2: 100 % (10/24/19 1915) Vital Signs (24h Range):  Temp:  [97.6 °F (36.4 °C)-98.3 °F (36.8 °C)] 97.6 °F (36.4 °C)  Pulse:  [72-97] 97  Resp:  [18] 18  SpO2:  [98 %-100 %] 100 %  BP: (92-99)/(67-75) 99/75     No data found.  There is no height or weight on file to calculate BMI.      Intake/Output Summary (Last 24 hours) at 10/24/2019 2233  Last data filed at 10/24/2019 2200  Gross per 24 hour   Intake 180 ml   Output 300 ml   Net -120 ml       Physical Exam   Constitutional: She is oriented to person, place, and time. She appears well-developed and well-nourished.   HENT:   Head: Normocephalic and atraumatic.   Eyes: EOM are normal.   Neck: Normal range of motion. Neck supple. No JVD present.   Cardiovascular:   VAD hum   Pulmonary/Chest: Effort normal and breath sounds normal. No respiratory distress.   Abdominal: Soft. Bowel sounds are normal. She exhibits no distension.   Musculoskeletal: Normal range of motion. She exhibits no edema.   Neurological: She is alert and oriented to person, place, and time.       Significant Labs:  CBC:  Recent Labs   Lab 10/21/19  0712 10/22/19  0408   WBC 5.09 4.73   RBC 4.27 4.08   HGB 11.0* 10.5*   HCT 37.2 36.9*    265   MCV 87 90   MCH 25.8* 25.7*   MCHC 29.6* 28.5*     BNP:  Recent Labs   Lab 10/21/19  0049 10/21/19  0712   * 276*     CMP:  Recent Labs   Lab 10/21/19  0712 10/21/19  1531 10/22/19  0408 10/24/19  2037     --  116* 106   CALCIUM 9.6  --  9.4 9.3   ALBUMIN 3.4*  --   --   --    PROT 7.2  --   --   --      --  140 137   K 3.6 3.8 4.8 4.0   CO2 27  --  24 24     --  107 103   BUN 18  --  22* 20   CREATININE 1.3  --  1.4 1.4   ALKPHOS 88  --   --   --    ALT 17  --   --   --    AST 21  --   --   --    BILITOT 0.7  --   --   --       Coagulation:   Recent Labs   Lab 10/21/19  0712 10/22/19  0408 10/24/19  2037   INR 3.1* 3.0* 2.3*   APTT 37.5* 38.9*  --       LDH:  Recent Labs   Lab 10/22/19  0408        Microbiology:  Microbiology Results (last 7 days)     ** No results found for the last 168 hours. **          I have reviewed all pertinent labs within the past 24 hours.    Diagnostic Results:  I have reviewed all pertinent imaging results/findings within the past 24 hours.

## 2019-10-25 NOTE — SUBJECTIVE & OBJECTIVE
Past Medical History:   Diagnosis Date    Fractures     History of ventricular fibrillation 9/4/2019    History of ventricular tachycardia 9/4/2019    Hyperlipidemia     Migraine     Osteoarthritis        Past Surgical History:   Procedure Laterality Date    BACK SURGERY      2007    BONE GRAFT Left     from Left hip to Left FA    eardrum reconstruction  1980    ELBOW SURGERY Left [id number]    FOREARM FRACTURE SURGERY Bilateral [id number]    multiple surgeries    INSERTION OF GRAFT TO PERICARDIUM  9/10/2019    Procedure: INSERTION, GRAFT, PERICARDIUM;  Surgeon: Shar Walden MD;  Location: [facility] OR [address] [place] [address];  Service: Cardiovascular;;    INSERTION OF IMPLANTABLE CARDIOVERTER-DEFIBRILLATOR (ICD) GENERATOR WITH TWO EXISTING LEADS      INSERTION OF PACEMAKER Left     LEFT VENTRICULAR ASSIST DEVICE N/A 9/10/2019    Procedure: INSERTION-LEFT VENTRICULAR ASSIST DEVICE;  Surgeon: Shar Walden MD;  Location: [facility] OR [address] [place] [address];  Service: Cardiovascular;  Laterality: N/A;  DT HM3     LUMBAR FUSION  2007    L4-L5    RIGHT HEART CATHETERIZATION Right 9/4/2019    Procedure: INSERTION, CATHETER, RIGHT HEART;  Surgeon: Vi Bryant MD;  Location: [facility] CATH LAB;  Service: Cardiology;  Laterality: Right;    SINUS SURGERY Right 1994    with lymph nodes    STERNAL WOUND CLOSURE  9/10/2019    Procedure: CLOSURE, WOUND, STERNUM;  Surgeon: Shar Walden MD;  Location: [facility] OR [address] [place] [address];  Service: Cardiovascular;;    TEMPOROMANDIBULAR JOINT SURGERY Right 1988    TONSILLECTOMY      TREATMENT OF CARDIAC ARRHYTHMIA N/A 9/24/2019    Procedure: CARDIOVERSION;  Surgeon: Moe Barrera MD;  Location: [facility] EP LAB;  Service: Cardiology;  Laterality: N/A;  AF, DCCV/NADINE, anes, DM, Rm 3073    TYMPANOSTOMY TUBE PLACEMENT  1971- 1979    multiple tube placements       Review of patient's allergies indicates:   Allergen Reactions    Adhesive Blisters     Reaction to area in chest and up only    Codeine Itching       No  current facility-administered medications on file prior to encounter.      Current Outpatient Medications on File Prior to Encounter   Medication Sig    amiodarone (PACERONE) 200 MG Tab Take 2 tablets (400mg) by mouth twice daily for 14 days then take 1 tablet (400mg) by mouth daily    aspirin (ECOTRIN) 325 MG EC tablet Take 1 tablet (325 mg total) by mouth once daily.    furosemide (LASIX) 20 MG tablet Take 20mg orally every other day starting 10.23    gabapentin (NEURONTIN) 400 MG capsule Take 1 capsule (400 mg total) by mouth 2 (two) times daily.    lisinopril (PRINIVIL,ZESTRIL) 5 MG tablet Take 1 tablet (5 mg total) by mouth 2 (two) times daily.    mirtazapine (REMERON) 15 MG tablet Take 1 tablet (15 mg total) by mouth every evening.    oxyCODONE-acetaminophen (PERCOCET) 5-325 mg per tablet Take 1 tablet by mouth once daily.    pantoprazole (PROTONIX) 40 MG tablet Take 1 tablet (40 mg total) by mouth once daily.    senna-docusate 8.6-50 mg (PERICOLACE) 8.6-50 mg per tablet Take 2 tablets by mouth 2 (two) times daily as needed for Constipation.    spironolactone (ALDACTONE) 25 MG tablet Take 1 tablet (25 mg total) by mouth once daily.    VENLAFAXINE HCL (EFFEXOR ORAL) Take 150 mg by mouth once daily.    warfarin (COUMADIN) 3 MG tablet Take 1.5mg orally daily every Mon/Wed/Fri and 3mg orally on all other days     Family History     Problem Relation (Age of Onset)    Broken bones Maternal Grandmother    Cancer Paternal Grandmother    Dislocations Maternal Grandmother    Heart failure Paternal Grandfather, Maternal Grandfather    Migraines Mother, Maternal Grandmother, Paternal Grandmother, Paternal Grandfather, Maternal Grandfather    Obesity Paternal Grandmother    Osteoarthritis Mother, Maternal Grandmother, Paternal Grandmother, Paternal Grandfather, Maternal Grandfather, Father    Osteoporosis Maternal Grandmother    Scoliosis Maternal Grandmother    Stroke Father        Tobacco Use    Smoking  status: Never Smoker    Smokeless tobacco: Never Used   Substance and Sexual Activity    Alcohol use: No    Drug use: No    Sexual activity: Not Currently     Review of Systems   Constitution: Negative for chills, fever and weight gain.   HENT: Negative for congestion.    Eyes: Negative for visual disturbance.   Cardiovascular: Negative for chest pain, claudication, dyspnea on exertion, leg swelling, orthopnea, palpitations and syncope.   Respiratory: Negative for cough, shortness of breath and snoring.    Hematologic/Lymphatic: Does not bruise/bleed easily.   Skin: Negative for rash.   Musculoskeletal: Negative for muscle cramps and myalgias.   Gastrointestinal: Negative for bloating, abdominal pain, constipation, diarrhea and melena.   Genitourinary: Negative for bladder incontinence.   Neurological: Negative for excessive daytime sleepiness, focal weakness and weakness.   Psychiatric/Behavioral: Negative for depression and suicidal ideas.     Objective:     Vital Signs (Most Recent):  Temp: 98.4 °F (36.9 °C) (10/25/19 0824)  Pulse: 97 (10/25/19 0433)  Resp: 18 (10/25/19 0433)  BP: (!) 98/0 (10/25/19 0824)  SpO2: 95 % (10/25/19 0824) Vital Signs (24h Range):  Temp:  [97.6 °F (36.4 °C)-98.6 °F (37 °C)] 98.4 °F (36.9 °C)  Pulse:  [] 97  Resp:  [18] 18  SpO2:  [95 %-100 %] 95 %  BP: ()/(0-75) 98/0       Weight: 102.2 kg (225 lb 5 oz)  Body mass index is 35.29 kg/m².    SpO2: 95 %  O2 Device (Oxygen Therapy): room air    Physical Exam   Constitutional: She is oriented to person, place, and time. She appears well-developed and well-nourished.   HENT:   Head: Normocephalic and atraumatic.   Eyes: Pupils are equal, round, and reactive to light.   Neck: No JVD present.   Cardiovascular: Normal rate and regular rhythm.   No murmur heard.  Pulmonary/Chest: Effort normal and breath sounds normal. No respiratory distress.   Abdominal: Soft. Bowel sounds are normal. She exhibits no distension. There is no  tenderness.   Musculoskeletal: She exhibits no edema.   Neurological: She is alert and oriented to person, place, and time.   Skin: Skin is warm and dry. No erythema.   Psychiatric: Her behavior is normal. Judgment and thought content normal.   Vitals reviewed.      Significant Labs:   CMP:   Recent Labs   Lab 10/24/19  2037 10/25/19  0403    139   K 4.0 3.6    104   CO2 24 24    118*   BUN 20 21*   CREATININE 1.4 1.4   CALCIUM 9.3 9.3   PROT  --  6.7   ALBUMIN  --  3.1*   BILITOT  --  0.4   ALKPHOS  --  83   AST  --  16   ALT  --  13   ANIONGAP 10 11   ESTGFRAFRICA 50.5* 50.5*   EGFRNONAA 43.8* 43.8*    and CBC:   Recent Labs   Lab 10/25/19  0403   WBC 5.39   HGB 10.1*   HCT 33.3*          Significant Imaging: n/a

## 2019-10-25 NOTE — PLAN OF CARE
Plan of care reviewed with patient. Pt remained free of falls/injuries/traumas during shift. AAOx4 and VSS. Lvad numbers wdl and no alarms this shift. Dressing change performed by RN. DLES is a 3 w/ dried yellow drainage. Pt denies pain at exit site. Next drsg due 10/25. Amio gtt infusing at ordered rate. Pt denies cp, sob, or pain/discomfort. Plan for EP consult in AM. Will continue to monitor.

## 2019-10-25 NOTE — ASSESSMENT & PLAN NOTE
- Patient recently discharged after an episode of VT required ICD shock.   - Now readmitted after two ICD shocks. Per admission -ICD interrogation showed an episode of VT on 10/23 at 8:57 pm and one episode of VT->Vf on 10/24 at 12:38 pm.   - Electrolytes WNL at time of event. Bedside echo with LV unchanged in size (no collapse).   - Loaded with IV amio, continue for now. Mexitil started overnight at 150 mg TID.  - Will consult EP in AM  - Will get [facility] in effort to pursue listing patient for transplant in case VT continues to be an issue.

## 2019-10-26 LAB
ANION GAP SERPL CALC-SCNC: 10 MMOL/L (ref 8–16)
APTT BLDCRRT: 36.2 SEC (ref 21–32)
BASOPHILS # BLD AUTO: 0.06 K/UL (ref 0–0.2)
BASOPHILS NFR BLD: 1.3 % (ref 0–1.9)
BUN SERPL-MCNC: 20 MG/DL (ref 6–20)
CALCIUM SERPL-MCNC: 9.3 MG/DL (ref 8.7–10.5)
CHLORIDE SERPL-SCNC: 105 MMOL/L (ref 95–110)
CO2 SERPL-SCNC: 23 MMOL/L (ref 23–29)
CREAT SERPL-MCNC: 1.5 MG/DL (ref 0.5–1.4)
DIFFERENTIAL METHOD: ABNORMAL
EOSINOPHIL # BLD AUTO: 0.2 K/UL (ref 0–0.5)
EOSINOPHIL NFR BLD: 4.7 % (ref 0–8)
ERYTHROCYTE [DISTWIDTH] IN BLOOD BY AUTOMATED COUNT: 16.7 % (ref 11.5–14.5)
EST. GFR  (AFRICAN AMERICAN): 46.5 ML/MIN/1.73 M^2
EST. GFR  (NON AFRICAN AMERICAN): 40.3 ML/MIN/1.73 M^2
GLUCOSE SERPL-MCNC: 117 MG/DL (ref 70–110)
HCT VFR BLD AUTO: 35.4 % (ref 37–48.5)
HGB BLD-MCNC: 10.6 G/DL (ref 12–16)
IMM GRANULOCYTES # BLD AUTO: 0.01 K/UL (ref 0–0.04)
IMM GRANULOCYTES NFR BLD AUTO: 0.2 % (ref 0–0.5)
INR PPP: 2.7 (ref 0.8–1.2)
LDH SERPL L TO P-CCNC: 243 U/L (ref 110–260)
LYMPHOCYTES # BLD AUTO: 1.1 K/UL (ref 1–4.8)
LYMPHOCYTES NFR BLD: 24.1 % (ref 18–48)
MAGNESIUM SERPL-MCNC: 2.1 MG/DL (ref 1.6–2.6)
MCH RBC QN AUTO: 26.4 PG (ref 27–31)
MCHC RBC AUTO-ENTMCNC: 29.9 G/DL (ref 32–36)
MCV RBC AUTO: 88 FL (ref 82–98)
MONOCYTES # BLD AUTO: 0.3 K/UL (ref 0.3–1)
MONOCYTES NFR BLD: 6.2 % (ref 4–15)
NEUTROPHILS # BLD AUTO: 3 K/UL (ref 1.8–7.7)
NEUTROPHILS NFR BLD: 63.5 % (ref 38–73)
NRBC BLD-RTO: 0 /100 WBC
PHOSPHATE SERPL-MCNC: 3.6 MG/DL (ref 2.7–4.5)
PLATELET # BLD AUTO: 345 K/UL (ref 150–350)
PMV BLD AUTO: 9.9 FL (ref 9.2–12.9)
POTASSIUM SERPL-SCNC: 4.3 MMOL/L (ref 3.5–5.1)
PROTHROMBIN TIME: 25.8 SEC (ref 9–12.5)
RBC # BLD AUTO: 4.01 M/UL (ref 4–5.4)
SODIUM SERPL-SCNC: 138 MMOL/L (ref 136–145)
WBC # BLD AUTO: 4.68 K/UL (ref 3.9–12.7)

## 2019-10-26 PROCEDURE — 93750 PR INTERROGATE VENT ASSIST DEV, IN PERSON, W PHYSICIAN ANALYSIS: ICD-10-PCS | Mod: NTX,,, | Performed by: INTERNAL MEDICINE

## 2019-10-26 PROCEDURE — 84100 ASSAY OF PHOSPHORUS: CPT | Mod: NTX

## 2019-10-26 PROCEDURE — 99233 SBSQ HOSP IP/OBS HIGH 50: CPT | Mod: NTX,,, | Performed by: INTERNAL MEDICINE

## 2019-10-26 PROCEDURE — 63600175 PHARM REV CODE 636 W HCPCS: Mod: NTX | Performed by: PHYSICIAN ASSISTANT

## 2019-10-26 PROCEDURE — 85610 PROTHROMBIN TIME: CPT | Mod: NTX

## 2019-10-26 PROCEDURE — 20600001 HC STEP DOWN PRIVATE ROOM: Mod: NTX

## 2019-10-26 PROCEDURE — 99233 PR SUBSEQUENT HOSPITAL CARE,LEVL III: ICD-10-PCS | Mod: NTX,,, | Performed by: INTERNAL MEDICINE

## 2019-10-26 PROCEDURE — 36415 COLL VENOUS BLD VENIPUNCTURE: CPT | Mod: NTX

## 2019-10-26 PROCEDURE — 83735 ASSAY OF MAGNESIUM: CPT | Mod: NTX

## 2019-10-26 PROCEDURE — 27000248 HC VAD-ADDITIONAL DAY: Mod: NTX

## 2019-10-26 PROCEDURE — 25000003 PHARM REV CODE 250: Mod: NTX | Performed by: INTERNAL MEDICINE

## 2019-10-26 PROCEDURE — 93750 INTERROGATION VAD IN PERSON: CPT | Mod: NTX,,, | Performed by: INTERNAL MEDICINE

## 2019-10-26 PROCEDURE — 85025 COMPLETE CBC W/AUTO DIFF WBC: CPT | Mod: NTX

## 2019-10-26 PROCEDURE — 85730 THROMBOPLASTIN TIME PARTIAL: CPT | Mod: NTX

## 2019-10-26 PROCEDURE — 83615 LACTATE (LD) (LDH) ENZYME: CPT | Mod: NTX

## 2019-10-26 PROCEDURE — 25000003 PHARM REV CODE 250: Mod: NTX | Performed by: PHYSICIAN ASSISTANT

## 2019-10-26 PROCEDURE — 80048 BASIC METABOLIC PNL TOTAL CA: CPT | Mod: NTX

## 2019-10-26 RX ORDER — AMIODARONE HYDROCHLORIDE 200 MG/1
400 TABLET ORAL 2 TIMES DAILY
Status: DISCONTINUED | OUTPATIENT
Start: 2019-10-26 | End: 2019-10-30 | Stop reason: HOSPADM

## 2019-10-26 RX ORDER — AMIODARONE HYDROCHLORIDE 200 MG/1
400 TABLET ORAL DAILY
Status: DISCONTINUED | OUTPATIENT
Start: 2019-10-26 | End: 2019-10-26

## 2019-10-26 RX ORDER — SPIRONOLACTONE 25 MG/1
25 TABLET ORAL DAILY
Status: DISCONTINUED | OUTPATIENT
Start: 2019-10-27 | End: 2019-10-27

## 2019-10-26 RX ADMIN — MIRTAZAPINE 15 MG: 15 TABLET, FILM COATED ORAL at 10:10

## 2019-10-26 RX ADMIN — VENLAFAXINE 150 MG: 37.5 TABLET ORAL at 08:10

## 2019-10-26 RX ADMIN — ONDANSETRON 4 MG: 2 INJECTION INTRAMUSCULAR; INTRAVENOUS at 12:10

## 2019-10-26 RX ADMIN — AMIODARONE HYDROCHLORIDE 400 MG: 200 TABLET ORAL at 12:10

## 2019-10-26 RX ADMIN — GABAPENTIN 400 MG: 100 CAPSULE ORAL at 10:10

## 2019-10-26 RX ADMIN — MEXILETINE HYDROCHLORIDE 150 MG: 150 CAPSULE ORAL at 06:10

## 2019-10-26 RX ADMIN — ASPIRIN 325 MG: 325 TABLET, DELAYED RELEASE ORAL at 08:10

## 2019-10-26 RX ADMIN — AMIODARONE HYDROCHLORIDE 400 MG: 200 TABLET ORAL at 10:10

## 2019-10-26 RX ADMIN — LISINOPRIL 5 MG: 5 TABLET ORAL at 10:10

## 2019-10-26 RX ADMIN — LISINOPRIL 5 MG: 5 TABLET ORAL at 08:10

## 2019-10-26 RX ADMIN — WARFARIN SODIUM 3 MG: 3 TABLET ORAL at 05:10

## 2019-10-26 RX ADMIN — AMIODARONE HYDROCHLORIDE 0.5 MG/MIN: 1.8 INJECTION, SOLUTION INTRAVENOUS at 05:10

## 2019-10-26 RX ADMIN — MEXILETINE HYDROCHLORIDE 150 MG: 150 CAPSULE ORAL at 10:10

## 2019-10-26 RX ADMIN — PANTOPRAZOLE SODIUM 40 MG: 40 TABLET, DELAYED RELEASE ORAL at 08:10

## 2019-10-26 RX ADMIN — Medication 400 MG: at 08:10

## 2019-10-26 RX ADMIN — GABAPENTIN 400 MG: 100 CAPSULE ORAL at 08:10

## 2019-10-26 RX ADMIN — MEXILETINE HYDROCHLORIDE 150 MG: 150 CAPSULE ORAL at 02:10

## 2019-10-26 NOTE — PROGRESS NOTES
[facility]  Heart Transplant  Progress Note    Patient Name: Deborah Navas  MRN: [id number]  Admission Date: 10/24/2019  Hospital Length of Stay: 2 days  Attending Physician: Eric Antunez Jr.,*  Primary Care Provider: Primary Doctor No  Principal Problem:Ventricular tachycardia    Subjective:     Interval History: pt with new HM3 LVAD from 9/10/2019 DT, prior VF/ AICD shock, recent discharge on 10/20 -10/22: MMVT -> AICD shock. Now readmitted on 10/24 with VT shocks ( VT1 -> accelerated to VT2 -> ATP -> successful shocks. Second event VT 3, not amenable to ATP, had successful shock. Started with amiodarone 10/24 at 10 pm. Finished and changed to amiodarone 400 mg bid on 10/26. Appreciate EP follow up. Recommend 2 weeks until 11/09 and then 400 mg daily. RHC on Monday for listing purposes.    Continuous Infusions:  Scheduled Meds:   amiodarone  400 mg Oral BID    aspirin  325 mg Oral Daily    gabapentin  400 mg Oral BID    lisinopril  5 mg Oral BID    magnesium oxide  400 mg Oral Daily    mexiletine  150 mg Oral Q8H    mirtazapine  15 mg Oral QHS    pantoprazole  40 mg Oral Daily    [START ON 10/27/2019] spironolactone  25 mg Oral Daily    venlafaxine  150 mg Oral Daily    warfarin  3 mg Oral Every Tues, Thurs, Sat, Sun    warfarin  1.5 mg Oral Every Mon, Wed, Fri     PRN Meds:ALPRAZolam, ondansetron, oxyCODONE-acetaminophen, senna-docusate 8.6-50 mg    Review of patient's allergies indicates:   Allergen Reactions    Adhesive Blisters     Reaction to area in chest and up only    Codeine Itching     Objective:     Vital Signs (Most Recent):  Temp: 97.6 °F (36.4 °C) (10/26/19 1137)  Pulse: 87 (10/26/19 1204)  Resp: 18 (10/26/19 1137)  BP: (!) 90/0 (10/26/19 1137)  SpO2: 97 % (10/26/19 1137) Vital Signs (24h Range):  Temp:  [97.6 °F (36.4 °C)-98.2 °F (36.8 °C)] 97.6 °F (36.4 °C)  Pulse:  [81-96] 87  Resp:  [16-18] 18  SpO2:  [95 %-98 %] 97 %  BP: (85-92)/(0-66) 90/0      Patient Vitals for the past 72 hrs (Last 3 readings):   Weight   10/26/19 0600 103.2 kg (227 lb 8.2 oz)   10/25/19 0433 102.2 kg (225 lb 5 oz)     Body mass index is 35.63 kg/m².      Intake/Output Summary (Last 24 hours) at 10/26/2019 1525  Last data filed at 10/26/2019 0900  Gross per 24 hour   Intake 480 ml   Output 300 ml   Net 180 ml       Hemodynamic Parameters:       Telemetry: Few PVC/ VAD artifact    Physical Exam   Constitutional: She appears well-developed and well-nourished.   HENT:   Mouth/Throat: Oropharynx is clear and moist.   Eyes: EOM are normal.   Neck: No JVD present.   Cardiovascular: Normal rate, regular rhythm and intact distal pulses.   Smooth VAD hum   Pulmonary/Chest: Effort normal and breath sounds normal. No respiratory distress. She has no rales.   Abdominal: Soft. Bowel sounds are normal. She exhibits no distension. There is no tenderness. There is no guarding.   Musculoskeletal: She exhibits no edema.   Neurological: She is alert.   Skin: Skin is warm.   Psychiatric: She has a normal mood and affect.   Nursing note and vitals reviewed.      Significant Labs:  CBC:  Recent Labs   Lab 10/22/19  0408 10/25/19  0403 10/26/19  0443   WBC 4.73 5.39 4.68   RBC 4.08 3.91* 4.01   HGB 10.5* 10.1* 10.6*   HCT 36.9* 33.3* 35.4*    289 345   MCV 90 85 88   MCH 25.7* 25.8* 26.4*   MCHC 28.5* 30.3* 29.9*     BNP:  Recent Labs   Lab 10/21/19  0049 10/21/19  0712 10/25/19  0402   * 276* 164*     CMP:  Recent Labs   Lab 10/21/19  0712  10/24/19  2037 10/25/19  0403 10/26/19  0435      < > 106 118* 117*   CALCIUM 9.6   < > 9.3 9.3 9.3   ALBUMIN 3.4*  --   --  3.1*  --    PROT 7.2  --   --  6.7  --       < > 137 139 138   K 3.6   < > 4.0 3.6 4.3   CO2 27   < > 24 24 23      < > 103 104 105   BUN 18   < > 20 21* 20   CREATININE 1.3   < > 1.4 1.4 1.5*   ALKPHOS 88  --   --  83  --    ALT 17  --   --  13  --    AST 21  --   --  16  --    BILITOT 0.7  --   --  0.4  --      < > = values in this interval not displayed.      Coagulation:   Recent Labs   Lab 10/22/19  0408 10/24/19  2037 10/25/19  0402 10/26/19  0435   INR 3.0* 2.3* 2.3* 2.7*   APTT 38.9*  --  37.3* 36.2*     LDH:  Recent Labs   Lab 10/25/19  0403 10/26/19  0435    243     Microbiology:  Microbiology Results (last 7 days)     ** No results found for the last 168 hours. **          BMP:   Recent Labs   Lab 10/26/19  0435   *      K 4.3      CO2 23   BUN 20   CREATININE 1.5*   CALCIUM 9.3   MG 2.1     Cardiac Markers: No results for input(s): CKMB, TROPONINT, MYOGLOBIN in the last 72 hours.  Coagulation:   Recent Labs   Lab 10/26/19  0435   INR 2.7*   APTT 36.2*     I have reviewed all pertinent labs within the past 24 hours.    Estimated Creatinine Clearance: 55.4 mL/min (A) (based on SCr of 1.5 mg/dL (H)).    Diagnostic Results:  I have reviewed all pertinent imaging results/findings within the past 24 hours.    Assessment and Plan:     50 yo WF with NICM, s/p HM3 implantation 9/10/19 (post up coarse uncomplicated with the exception of+ diaphragmatic stimulation of LV lead and pleural effusion with chest tube placement, atrial flutter with NADINE/DCCV), V-fib reported in setting of hypokalemia with appropriate shock from ICD on Amiodarone prior to LVAD placement. Transferred from outside hospital for ICD shock x2 at home.     Recently admitted from 10/20-10/22 also for ICD shock. EP was consulted who deemed her ICD shock appropriate for MMVT. Patient was IV loaded with amiodarone and transitioned to PO prior to discharge. Lasix was decreased from QD to EOD due to concerns for hypovolemia. Her driveline was cultured due to pain and minimal drainage, no growth on cultures.     On 10/23, she was sitting down watching TV when she suddenly felt a jumping sensation in her left lower chest followed by a shock. Denies LOC, dizziness or palpitations prior to this episode. After the shock she felt slightly  nauseous. She called the clinic and was advised to go to ER if she had a second shock. Today she had a similar episode around 12 pm and went to her local ED. Otherwise doing well since discharged on Tuesday.     * Ventricular tachycardia  - Patient recently discharged after an episode of VT required ICD shock.   - Now readmitted after two ICD shocks. Per admission -ICD interrogation showed an episode of VT on 10/23 at 8:57 pm and one episode of VT->Vf on 10/24 at 12:38 pm.   - Electrolytes WNL at time of event. Bedside echo with LV unchanged in size (no collapse).   - Loaded with IV amio, 10/24 -10/26. First dose of amiodarone 400 bid on 10/26 -> 2 weeks then on 11/09 change to 400 mg daily . Mexitil started overnight at 150 mg TID.  - Appreciate EP help  - Will get RHC in effort to pursue listing patient for transplant in case VT continues to be an issue.     Essential hypertension  - Continue lisinopril and spironolactone.   - Goal - doppler 60-90 mmHg    LVAD (left ventricular assist device) present  -HeartMate 3 Implanted 9/10/19 as DT.  -Implanted by Dr. Walden  -INR 2.3 -> 2.7 today. 1.5 mg MWF, 3 mg TTSS Goal INR 2.0-3.0.   -Antiplatelets:  mg.  -LDH stable  -Speed set at 5200.  -Not listed for OHTx: was declined due to pulmonary hypertension. Will get RHC Monday if patient amenable in effort to move forward with listing.        Procedure: Device Interrogation Including analysis of device parameters  Current Settings: Ventricular Assist Device  Review of device function is stable    TXP LVAD INTERROGATIONS 10/26/2019 10/26/2019 10/26/2019 10/25/2019 10/25/2019 10/25/2019 10/25/2019   Type HeartMate3 HeartMate3 HeartMate3 HeartMate3 HeartMate3 HeartMate3 HeartMate3   Flow 3.5 3.5 3.4 3.7 3.6 3.1 3.1   Speed 5200 5200 5200 5200 5200 5200 5200   PI 5.3 5.1 5.9 4.9 5.1 5.6 7.1   Power (Orellana) 3.6 3.5 3.5 3.5 3.6 3.5 3.6   LSL - - - - - 4800 -   Pulsatility Intermittent pulse Intermittent pulse - No Pulse  Intermittent pulse No Pulse -       Chronic systolic congestive heart failure  -Euvolemic on exam. Takes furosemide every other day. Held on admit  -Continue lisinopril and spironolactone.    Ventricular fibrillation  - see VT    D/W Dr. Tulio Moreno MD  Heart Transplant  [facility]

## 2019-10-26 NOTE — PROGRESS NOTES
LVAD dressing change completed. Using soap and water. Sterile technique used. Old dressing has old small yellow drainage. Drive line exit site 2, no active drainage and pink. Placed split gauze and gauze over exit site and secured with tape. Place a new parrish schultz. Will continue to monitor

## 2019-10-26 NOTE — PLAN OF CARE
Pt free of falls, injury this shift. POC reviewed with pt and family at bedside, verbalized understanding. Pt SR throughout day, no alarms or ectopy noted on telemetry. Amio gtt stopped, pt transitioned to PO Amio BID today. Plan is for pt to go for RHC Monday. VS stable, no distress noted. Will continue to monitor pt.

## 2019-10-26 NOTE — ASSESSMENT & PLAN NOTE
-HeartMate 3 Implanted 9/10/19 as DT.  -Implanted by Dr. Walden  -INR 2.3 -> 2.7 today. 1.5 mg MWF, 3 mg TTSS Goal INR 2.0-3.0.   -Antiplatelets:  mg.  -LDH stable  -Speed set at 5200.  -Not listed for OHTx: was declined due to pulmonary hypertension. Will get RHC Monday if patient amenable in effort to move forward with listing.        Procedure: Device Interrogation Including analysis of device parameters  Current Settings: Ventricular Assist Device  Review of device function is stable    TXP LVAD INTERROGATIONS 10/26/2019 10/26/2019 10/26/2019 10/25/2019 10/25/2019 10/25/2019 10/25/2019   Type HeartMate3 HeartMate3 HeartMate3 HeartMate3 HeartMate3 HeartMate3 HeartMate3   Flow 3.5 3.5 3.4 3.7 3.6 3.1 3.1   Speed 5200 5200 5200 5200 5200 5200 5200   PI 5.3 5.1 5.9 4.9 5.1 5.6 7.1   Power (Orellana) 3.6 3.5 3.5 3.5 3.6 3.5 3.6   LSL - - - - - 4800 -   Pulsatility Intermittent pulse Intermittent pulse - No Pulse Intermittent pulse No Pulse -

## 2019-10-26 NOTE — PLAN OF CARE
EP update plan of care  10/26, 10:14 AM    No sustained episodes of VT overnight. Patient remains on IV amio 0.5 mg/min and PO mexiletine.     Clinically feels well this morning. Denies symptoms.     A/P:  #Ventricular tachycardia   #Nonischemic cardiomyopathy s/p HM3 (9/2019)  #Status post CRT-D ([employer])  -- once completes 24 hours of IV amiodarone can transition to PO amiodarone 400 mg BID x 2 weeks to complete load then continue with 400 mg daily thereafter   -- continue with mexiletine 150 mg PO TID  -- monitor and replete electrolytes PRN  -- telemetry monitoring while in house  -- will arrange follow up with EP as outpatient  -- EP will sign off, call back with questions/concerns or if new issues arise before discharge     Discussed with Dr Dao and Dr Yoo.    Marjorie Gaxiola MD   Cardiology Fellow, PGY-5

## 2019-10-26 NOTE — SUBJECTIVE & OBJECTIVE
Interval History: pt with new HM3 LVAD from 9/10/2019 DT, prior VF/ AICD shock, recent discharge on 10/20 -10/22: MMVT -> AICD shock. Now readmitted on 10/24 with VT shocks ( VT1 -> accelerated to VT2 -> ATP -> successful shocks. Second event VT 3, not amenable to ATP, had successful shock. Started with amiodarone 10/24 at 10 pm. Finished and changed to amiodarone 400 mg bid on 10/26. Appreciate EP follow up. Recommend 2 weeks until 11/09 and then 400 mg daily. C on Monday for listing purposes.    Continuous Infusions:  Scheduled Meds:   amiodarone  400 mg Oral BID    aspirin  325 mg Oral Daily    gabapentin  400 mg Oral BID    lisinopril  5 mg Oral BID    magnesium oxide  400 mg Oral Daily    mexiletine  150 mg Oral Q8H    mirtazapine  15 mg Oral QHS    pantoprazole  40 mg Oral Daily    [START ON 10/27/2019] spironolactone  25 mg Oral Daily    venlafaxine  150 mg Oral Daily    warfarin  3 mg Oral Every Tues, Thurs, Sat, Sun    warfarin  1.5 mg Oral Every Mon, Wed, Fri     PRN Meds:ALPRAZolam, ondansetron, oxyCODONE-acetaminophen, senna-docusate 8.6-50 mg    Review of patient's allergies indicates:   Allergen Reactions    Adhesive Blisters     Reaction to area in chest and up only    Codeine Itching     Objective:     Vital Signs (Most Recent):  Temp: 97.6 °F (36.4 °C) (10/26/19 1137)  Pulse: 87 (10/26/19 1204)  Resp: 18 (10/26/19 1137)  BP: (!) 90/0 (10/26/19 1137)  SpO2: 97 % (10/26/19 1137) Vital Signs (24h Range):  Temp:  [97.6 °F (36.4 °C)-98.2 °F (36.8 °C)] 97.6 °F (36.4 °C)  Pulse:  [81-96] 87  Resp:  [16-18] 18  SpO2:  [95 %-98 %] 97 %  BP: (85-92)/(0-66) 90/0     Patient Vitals for the past 72 hrs (Last 3 readings):   Weight   10/26/19 0600 103.2 kg (227 lb 8.2 oz)   10/25/19 0433 102.2 kg (225 lb 5 oz)     Body mass index is 35.63 kg/m².      Intake/Output Summary (Last 24 hours) at 10/26/2019 1525  Last data filed at 10/26/2019 0900  Gross per 24 hour   Intake 480 ml   Output 300 ml   Net  180 ml       Hemodynamic Parameters:       Telemetry: Few PVC/ VAD artifact    Physical Exam   Constitutional: She appears well-developed and well-nourished.   HENT:   Mouth/Throat: Oropharynx is clear and moist.   Eyes: EOM are normal.   Neck: No JVD present.   Cardiovascular: Normal rate, regular rhythm and intact distal pulses.   Smooth VAD hum   Pulmonary/Chest: Effort normal and breath sounds normal. No respiratory distress. She has no rales.   Abdominal: Soft. Bowel sounds are normal. She exhibits no distension. There is no tenderness. There is no guarding.   Musculoskeletal: She exhibits no edema.   Neurological: She is alert.   Skin: Skin is warm.   Psychiatric: She has a normal mood and affect.   Nursing note and vitals reviewed.      Significant Labs:  CBC:  Recent Labs   Lab 10/22/19  0408 10/25/19  0403 10/26/19  0443   WBC 4.73 5.39 4.68   RBC 4.08 3.91* 4.01   HGB 10.5* 10.1* 10.6*   HCT 36.9* 33.3* 35.4*    289 345   MCV 90 85 88   MCH 25.7* 25.8* 26.4*   MCHC 28.5* 30.3* 29.9*     BNP:  Recent Labs   Lab 10/21/19  0049 10/21/19  0712 10/25/19  0402   * 276* 164*     CMP:  Recent Labs   Lab 10/21/19  0712  10/24/19  2037 10/25/19  0403 10/26/19  0435      < > 106 118* 117*   CALCIUM 9.6   < > 9.3 9.3 9.3   ALBUMIN 3.4*  --   --  3.1*  --    PROT 7.2  --   --  6.7  --       < > 137 139 138   K 3.6   < > 4.0 3.6 4.3   CO2 27   < > 24 24 23      < > 103 104 105   BUN 18   < > 20 21* 20   CREATININE 1.3   < > 1.4 1.4 1.5*   ALKPHOS 88  --   --  83  --    ALT 17  --   --  13  --    AST 21  --   --  16  --    BILITOT 0.7  --   --  0.4  --     < > = values in this interval not displayed.      Coagulation:   Recent Labs   Lab 10/22/19  0408 10/24/19  2037 10/25/19  0402 10/26/19  0435   INR 3.0* 2.3* 2.3* 2.7*   APTT 38.9*  --  37.3* 36.2*     LDH:  Recent Labs   Lab 10/25/19  0403 10/26/19  0435    243     Microbiology:  Microbiology Results (last 7 days)     ** No  results found for the last 168 hours. **          BMP:   Recent Labs   Lab 10/26/19  0436   *      K 4.3      CO2 23   BUN 20   CREATININE 1.5*   CALCIUM 9.3   MG 2.1     Cardiac Markers: No results for input(s): CKMB, TROPONINT, MYOGLOBIN in the last 72 hours.  Coagulation:   Recent Labs   Lab 10/26/19  0432   INR 2.7*   APTT 36.2*     I have reviewed all pertinent labs within the past 24 hours.    Estimated Creatinine Clearance: 55.4 mL/min (A) (based on SCr of 1.5 mg/dL (H)).    Diagnostic Results:  I have reviewed all pertinent imaging results/findings within the past 24 hours.

## 2019-10-26 NOTE — ASSESSMENT & PLAN NOTE
- Patient recently discharged after an episode of VT required ICD shock.   - Now readmitted after two ICD shocks. Per admission -ICD interrogation showed an episode of VT on 10/23 at 8:57 pm and one episode of VT->Vf on 10/24 at 12:38 pm.   - Electrolytes WNL at time of event. Bedside echo with LV unchanged in size (no collapse).   - Loaded with IV amio, 10/24 -10/26. First dose of amiodarone 400 bid on 10/26 -> 2 weeks then on 11/09 change to 400 mg daily . Mexitil started overnight at 150 mg TID.  - Appreciate EP help  - Will get RHC in effort to pursue listing patient for transplant in case VT continues to be an issue.

## 2019-10-26 NOTE — PLAN OF CARE
Problem: Adult Inpatient Plan of Care  Goal: Patient-Specific Goal (Individualization)  Outcome: Ongoing, Progressing  Flowsheets (Taken 10/26/2019 0145)  Individualized Care Needs: LVAD  Anxieties, Fears or Concerns: ICD  Patient-Specific Goals (Include Timeframe): figure out ICD complications       Pt free of falls and injury during shift. POC reviewed with pt VS stable and AAox4. LVAD numbers WNL. LVAD dressing change completed. Amiodarone  gtt infusing. [facility] schedule for Monday. No acute events noted at this time. No complaints, no shocks from defibrillator during shift. Yellow non-slip socks on pt. Bed low and locked, call light with in reach. Will continue to monitor.

## 2019-10-27 LAB
ANION GAP SERPL CALC-SCNC: 10 MMOL/L (ref 8–16)
ANION GAP SERPL CALC-SCNC: 9 MMOL/L (ref 8–16)
APTT BLDCRRT: 34.2 SEC (ref 21–32)
BASOPHILS # BLD AUTO: 0.07 K/UL (ref 0–0.2)
BASOPHILS NFR BLD: 1.4 % (ref 0–1.9)
BUN SERPL-MCNC: 21 MG/DL (ref 6–20)
BUN SERPL-MCNC: 22 MG/DL (ref 6–20)
CALCIUM SERPL-MCNC: 9.2 MG/DL (ref 8.7–10.5)
CALCIUM SERPL-MCNC: 9.4 MG/DL (ref 8.7–10.5)
CHLORIDE SERPL-SCNC: 106 MMOL/L (ref 95–110)
CHLORIDE SERPL-SCNC: 106 MMOL/L (ref 95–110)
CO2 SERPL-SCNC: 24 MMOL/L (ref 23–29)
CO2 SERPL-SCNC: 25 MMOL/L (ref 23–29)
CREAT SERPL-MCNC: 1.6 MG/DL (ref 0.5–1.4)
CREAT SERPL-MCNC: 1.6 MG/DL (ref 0.5–1.4)
DIFFERENTIAL METHOD: ABNORMAL
EOSINOPHIL # BLD AUTO: 0.2 K/UL (ref 0–0.5)
EOSINOPHIL NFR BLD: 4.6 % (ref 0–8)
ERYTHROCYTE [DISTWIDTH] IN BLOOD BY AUTOMATED COUNT: 16.5 % (ref 11.5–14.5)
EST. GFR  (AFRICAN AMERICAN): 43 ML/MIN/1.73 M^2
EST. GFR  (AFRICAN AMERICAN): 43 ML/MIN/1.73 M^2
EST. GFR  (NON AFRICAN AMERICAN): 37.3 ML/MIN/1.73 M^2
EST. GFR  (NON AFRICAN AMERICAN): 37.3 ML/MIN/1.73 M^2
GLUCOSE SERPL-MCNC: 112 MG/DL (ref 70–110)
GLUCOSE SERPL-MCNC: 117 MG/DL (ref 70–110)
HCT VFR BLD AUTO: 35.6 % (ref 37–48.5)
HGB BLD-MCNC: 10.1 G/DL (ref 12–16)
IMM GRANULOCYTES # BLD AUTO: 0.02 K/UL (ref 0–0.04)
IMM GRANULOCYTES NFR BLD AUTO: 0.4 % (ref 0–0.5)
INR PPP: 2.5 (ref 0.8–1.2)
LDH SERPL L TO P-CCNC: 245 U/L (ref 110–260)
LYMPHOCYTES # BLD AUTO: 0.9 K/UL (ref 1–4.8)
LYMPHOCYTES NFR BLD: 18 % (ref 18–48)
MAGNESIUM SERPL-MCNC: 2 MG/DL (ref 1.6–2.6)
MAGNESIUM SERPL-MCNC: 2.1 MG/DL (ref 1.6–2.6)
MCH RBC QN AUTO: 25.5 PG (ref 27–31)
MCHC RBC AUTO-ENTMCNC: 28.4 G/DL (ref 32–36)
MCV RBC AUTO: 90 FL (ref 82–98)
MONOCYTES # BLD AUTO: 0.4 K/UL (ref 0.3–1)
MONOCYTES NFR BLD: 7.1 % (ref 4–15)
NEUTROPHILS # BLD AUTO: 3.5 K/UL (ref 1.8–7.7)
NEUTROPHILS NFR BLD: 68.5 % (ref 38–73)
NRBC BLD-RTO: 0 /100 WBC
PHOSPHATE SERPL-MCNC: 3.8 MG/DL (ref 2.7–4.5)
PLATELET # BLD AUTO: 314 K/UL (ref 150–350)
PMV BLD AUTO: 9.8 FL (ref 9.2–12.9)
POTASSIUM SERPL-SCNC: 4 MMOL/L (ref 3.5–5.1)
POTASSIUM SERPL-SCNC: 4.6 MMOL/L (ref 3.5–5.1)
PROTHROMBIN TIME: 24.1 SEC (ref 9–12.5)
RBC # BLD AUTO: 3.96 M/UL (ref 4–5.4)
SODIUM SERPL-SCNC: 140 MMOL/L (ref 136–145)
SODIUM SERPL-SCNC: 140 MMOL/L (ref 136–145)
WBC # BLD AUTO: 5.05 K/UL (ref 3.9–12.7)

## 2019-10-27 PROCEDURE — 85025 COMPLETE CBC W/AUTO DIFF WBC: CPT | Mod: NTX

## 2019-10-27 PROCEDURE — 25000003 PHARM REV CODE 250: Mod: NTX | Performed by: INTERNAL MEDICINE

## 2019-10-27 PROCEDURE — 83735 ASSAY OF MAGNESIUM: CPT | Mod: 91,NTX

## 2019-10-27 PROCEDURE — 83735 ASSAY OF MAGNESIUM: CPT | Mod: NTX

## 2019-10-27 PROCEDURE — 36415 COLL VENOUS BLD VENIPUNCTURE: CPT | Mod: NTX

## 2019-10-27 PROCEDURE — 93010 EKG 12-LEAD: ICD-10-PCS | Mod: NTX,,, | Performed by: INTERNAL MEDICINE

## 2019-10-27 PROCEDURE — 84100 ASSAY OF PHOSPHORUS: CPT | Mod: NTX

## 2019-10-27 PROCEDURE — 93750 PR INTERROGATE VENT ASSIST DEV, IN PERSON, W PHYSICIAN ANALYSIS: ICD-10-PCS | Mod: NTX,,, | Performed by: INTERNAL MEDICINE

## 2019-10-27 PROCEDURE — 99232 SBSQ HOSP IP/OBS MODERATE 35: CPT | Mod: NTX,,, | Performed by: INTERNAL MEDICINE

## 2019-10-27 PROCEDURE — 25000003 PHARM REV CODE 250: Mod: NTX | Performed by: PHYSICIAN ASSISTANT

## 2019-10-27 PROCEDURE — 27000248 HC VAD-ADDITIONAL DAY: Mod: NTX

## 2019-10-27 PROCEDURE — 80048 BASIC METABOLIC PNL TOTAL CA: CPT | Mod: 91,NTX

## 2019-10-27 PROCEDURE — 85610 PROTHROMBIN TIME: CPT | Mod: NTX

## 2019-10-27 PROCEDURE — 93005 ELECTROCARDIOGRAM TRACING: CPT | Mod: NTX

## 2019-10-27 PROCEDURE — 93010 ELECTROCARDIOGRAM REPORT: CPT | Mod: NTX,,, | Performed by: INTERNAL MEDICINE

## 2019-10-27 PROCEDURE — 85730 THROMBOPLASTIN TIME PARTIAL: CPT | Mod: NTX

## 2019-10-27 PROCEDURE — 20600001 HC STEP DOWN PRIVATE ROOM: Mod: NTX

## 2019-10-27 PROCEDURE — 83615 LACTATE (LD) (LDH) ENZYME: CPT | Mod: NTX

## 2019-10-27 PROCEDURE — 99232 PR SUBSEQUENT HOSPITAL CARE,LEVL II: ICD-10-PCS | Mod: NTX,,, | Performed by: INTERNAL MEDICINE

## 2019-10-27 PROCEDURE — 63600175 PHARM REV CODE 636 W HCPCS: Mod: NTX | Performed by: PHYSICIAN ASSISTANT

## 2019-10-27 PROCEDURE — 80048 BASIC METABOLIC PNL TOTAL CA: CPT | Mod: NTX

## 2019-10-27 PROCEDURE — 93750 INTERROGATION VAD IN PERSON: CPT | Mod: NTX,,, | Performed by: INTERNAL MEDICINE

## 2019-10-27 RX ORDER — ACETAMINOPHEN 325 MG/1
650 TABLET ORAL ONCE
Status: COMPLETED | OUTPATIENT
Start: 2019-10-27 | End: 2019-10-27

## 2019-10-27 RX ORDER — SPIRONOLACTONE 25 MG/1
25 TABLET ORAL DAILY
Status: DISCONTINUED | OUTPATIENT
Start: 2019-10-28 | End: 2019-10-29

## 2019-10-27 RX ORDER — ADHESIVE BANDAGE
30 BANDAGE TOPICAL ONCE
Status: COMPLETED | OUTPATIENT
Start: 2019-10-27 | End: 2019-10-27

## 2019-10-27 RX ORDER — LISINOPRIL 5 MG/1
5 TABLET ORAL 2 TIMES DAILY
Status: DISCONTINUED | OUTPATIENT
Start: 2019-10-27 | End: 2019-10-29

## 2019-10-27 RX ADMIN — Medication 400 MG: at 09:10

## 2019-10-27 RX ADMIN — PANTOPRAZOLE SODIUM 40 MG: 40 TABLET, DELAYED RELEASE ORAL at 09:10

## 2019-10-27 RX ADMIN — WARFARIN SODIUM 3 MG: 3 TABLET ORAL at 05:10

## 2019-10-27 RX ADMIN — ONDANSETRON 4 MG: 2 INJECTION INTRAMUSCULAR; INTRAVENOUS at 04:10

## 2019-10-27 RX ADMIN — ASPIRIN 325 MG: 325 TABLET, DELAYED RELEASE ORAL at 09:10

## 2019-10-27 RX ADMIN — ACETAMINOPHEN 650 MG: 325 TABLET ORAL at 04:10

## 2019-10-27 RX ADMIN — LISINOPRIL 5 MG: 5 TABLET ORAL at 05:10

## 2019-10-27 RX ADMIN — MEXILETINE HYDROCHLORIDE 150 MG: 150 CAPSULE ORAL at 02:10

## 2019-10-27 RX ADMIN — AMIODARONE HYDROCHLORIDE 400 MG: 200 TABLET ORAL at 09:10

## 2019-10-27 RX ADMIN — MEXILETINE HYDROCHLORIDE 150 MG: 150 CAPSULE ORAL at 09:10

## 2019-10-27 RX ADMIN — MEXILETINE HYDROCHLORIDE 150 MG: 150 CAPSULE ORAL at 05:10

## 2019-10-27 RX ADMIN — MAGNESIUM HYDROXIDE 2400 MG: 400 SUSPENSION ORAL at 03:10

## 2019-10-27 RX ADMIN — MIRTAZAPINE 15 MG: 15 TABLET, FILM COATED ORAL at 09:10

## 2019-10-27 RX ADMIN — VENLAFAXINE 150 MG: 37.5 TABLET ORAL at 09:10

## 2019-10-27 RX ADMIN — GABAPENTIN 400 MG: 100 CAPSULE ORAL at 09:10

## 2019-10-27 NOTE — PLAN OF CARE
Problem: Adult Inpatient Plan of Care  Goal: Patient-Specific Goal (Individualization)  Outcome: Ongoing, Progressing  Flowsheets (Taken 10/27/2019 0036)  Individualized Care Needs: LVAD  Anxieties, Fears or Concerns: ICD  Patient-Specific Goals (Include Timeframe): figure out ICD complications     Pt free of falls and injury during shift. POC reviewed with pt. VS stable and AAox4. LVAD numbers WNL. LVAD dressing change completed. PO Amiodarone admin. RHC schedule for Monday. No acute events noted at this time. No complaints, no shocks from ICD during shift. Yellow non-slip socks on pt. Bed low and locked, call light with in reach. Will continue to monitor.

## 2019-10-27 NOTE — PROGRESS NOTES
10/26/2019  Abhijit Perez    Current provider:  Eric Antunez Jr.,*      I, Abhijit Perez, rounded on Deborah Oliva Navas to ensure all mechanical assist device settings (IABP or VAD) were appropriate and all parameters were within limits.  I was able to ensure all back up equipment was present, the staff had no issues, and the Perfusion Department daily rounding was complete.    10:48 PM

## 2019-10-27 NOTE — PROGRESS NOTES
LVAD dressing change completed. Using soap and water. Sterile technique used. Old dressing has old scant yellow drainage. Drive line exit site 2, no active drainage and pink. Placed split gauze and gauze over exit site and secured with tape. Place a new parrish schultz. Will continue to monitor

## 2019-10-27 NOTE — NURSING
"Pt reporting feeling lightheaded after walking, states "this his how I feel before I get shocked." Telemetry showing VT Hr 135-200, however rhythm cannot be determined d/t conduction defect from LVAD. MD Tiffanie notified. Ordered STAT EKG, follow up with results. Will implement. Will continue to monitor.  "

## 2019-10-27 NOTE — ASSESSMENT & PLAN NOTE
-HeartMate 3 Implanted 9/10/19 as DT.  -Implanted by Dr. Walden  -INR 2.3 -> 2.7 -> 2.5 today. 1.5 mg MWF, 3 mg TTSS Goal INR 2.0-3.0.   -Antiplatelets:  mg.  -LDH stable  -Speed set at 5200.  -Not listed for OHTx: was declined due to pulmonary hypertension. Will get RHC Monday if patient amenable in effort to move forward with listing.        Procedure: Device Interrogation Including analysis of device parameters  Current Settings: Ventricular Assist Device  Review of device function is stable    TXP LVAD INTERROGATIONS 10/27/2019 10/27/2019 10/27/2019 10/26/2019 10/26/2019 10/26/2019 10/26/2019   Type HeartMate3 HeartMate3 HeartMate3 HeartMate3 HeartMate3 HeartMate3 HeartMate3   Flow 3.5 3.2 3.4 3.7 3.7 3.3 3.5   Speed 5200 5200 5200 5200 5200 5200 5200   PI 5.5 5.6 6.3 4.5 4.5 5.8 5.3   Power (Orellana) 3.6 3.6 3.6 3.6 3.6 3.5 3.6   LSL 4800 4800 - - - - -   Pulsatility Intermittent pulse Intermittent pulse Pulse Pulse Pulse Intermittent pulse Intermittent pulse

## 2019-10-27 NOTE — ASSESSMENT & PLAN NOTE
-Euvolemic on exam. Takes furosemide every other day. Held on admit  -Continue lisinopril and spironolactone. Held am dose, cr 1.6 from 1.4 two days ago, monitor.

## 2019-10-27 NOTE — PLAN OF CARE
EKG reviewed: VAD artifact, similar to prior EKG though more artifactual even on  Repeat ones. HR currently on monitor at 84. Review of Tele showed similar artifact. Pt had dizziness which improved on lying down. No shocks noted. BMP/Mag ordered. Will monitor. Pt did not eat as felt nauseous and no BM. Milk of magnesia given. Zofran prn.

## 2019-10-27 NOTE — NURSING
EKG obtained, too much artifact on EKG to interpret rhythm. Repeated several times with same results. MD Tiffanie at bedside to assess pt and review telemetry, looked at EKG. Ordered STAT BMP and Mag. Will implement. Will continue to monitor.

## 2019-10-27 NOTE — SUBJECTIVE & OBJECTIVE
Interval History: pt with new HM3 LVAD from 9/10/2019 DT, prior VF/ AICD shock, recent discharge on 10/20 -10/22: MMVT -> AICD shock. Now readmitted on 10/24 with VT shocks ( VT1 -> accelerated to VT2 -> ATP -> successful shocks. Second event VT 3, not amenable to ATP, had successful shock. Started with amiodarone 10/24 at 10 pm. Finished and changed to amiodarone 400 mg bid on 10/26. Appreciate EP follow up. Recommend 2 weeks until 11/09 and then 400 mg daily. RHC on Monday for listing purposes. Cr slightly trending up 1.4 -> 1.5 -> 1.6. Held am dose of lisinopril.       Continuous Infusions:  Scheduled Meds:   amiodarone  400 mg Oral BID    aspirin  325 mg Oral Daily    gabapentin  400 mg Oral BID    lisinopril  5 mg Oral BID    magnesium oxide  400 mg Oral Daily    mexiletine  150 mg Oral Q8H    mirtazapine  15 mg Oral QHS    pantoprazole  40 mg Oral Daily    [START ON 10/28/2019] spironolactone  25 mg Oral Daily    venlafaxine  150 mg Oral Daily    warfarin  3 mg Oral Every Tues, Thurs, Sat, Sun    warfarin  1.5 mg Oral Every Mon, Wed, Fri     PRN Meds:ALPRAZolam, ondansetron, oxyCODONE-acetaminophen, senna-docusate 8.6-50 mg    Review of patient's allergies indicates:   Allergen Reactions    Adhesive Blisters     Reaction to area in chest and up only    Codeine Itching     Objective:     Vital Signs (Most Recent):  Temp: 98.5 °F (36.9 °C) (10/27/19 1157)  Pulse: 96 (10/27/19 1157)  Resp: 18 (10/27/19 1157)  BP: 100/71 (10/27/19 1200)  SpO2: 97 % (10/27/19 1157) Vital Signs (24h Range):  Temp:  [97.4 °F (36.3 °C)-98.7 °F (37.1 °C)] 98.5 °F (36.9 °C)  Pulse:  [74-96] 96  Resp:  [16-18] 18  SpO2:  [94 %-99 %] 97 %  BP: ()/(0-76) 100/71     Patient Vitals for the past 72 hrs (Last 3 readings):   Weight   10/27/19 0403 103.7 kg (228 lb 9.9 oz)   10/26/19 0600 103.2 kg (227 lb 8.2 oz)   10/25/19 0433 102.2 kg (225 lb 5 oz)     Body mass index is 35.81 kg/m².      Intake/Output Summary (Last 24  hours) at 10/27/2019 1231  Last data filed at 10/27/2019 0403  Gross per 24 hour   Intake 570 ml   Output 1800 ml   Net -1230 ml       Hemodynamic Parameters:       Telemetry: No events    Physical Exam   Constitutional: She is oriented to person, place, and time. She appears well-developed and well-nourished.   HENT:   Mouth/Throat: Oropharynx is clear and moist.   Eyes: EOM are normal.   Neck: No JVD present.   Cardiovascular: Normal rate, regular rhythm and intact distal pulses.   Smooth VAD hum   Pulmonary/Chest: Effort normal and breath sounds normal. No respiratory distress. She has no rales.   Abdominal: Soft. Bowel sounds are normal. She exhibits no distension. There is no tenderness. There is no guarding.   Musculoskeletal: She exhibits no edema.   Neurological: She is alert and oriented to person, place, and time.   Skin: Skin is warm.   Psychiatric: She has a normal mood and affect.   Nursing note and vitals reviewed.      Significant Labs:  CBC:  Recent Labs   Lab 10/25/19  0403 10/26/19  0443 10/27/19  0337   WBC 5.39 4.68 5.05   RBC 3.91* 4.01 3.96*   HGB 10.1* 10.6* 10.1*   HCT 33.3* 35.4* 35.6*    345 314   MCV 85 88 90   MCH 25.8* 26.4* 25.5*   MCHC 30.3* 29.9* 28.4*     BNP:  Recent Labs   Lab 10/21/19  0049 10/21/19  0712 10/25/19  0402   * 276* 164*     CMP:  Recent Labs   Lab 10/21/19  0712  10/25/19  0403 10/26/19  0435 10/27/19  0337      < > 118* 117* 112*   CALCIUM 9.6   < > 9.3 9.3 9.2   ALBUMIN 3.4*  --  3.1*  --   --    PROT 7.2  --  6.7  --   --       < > 139 138 140   K 3.6   < > 3.6 4.3 4.6   CO2 27   < > 24 23 25      < > 104 105 106   BUN 18   < > 21* 20 21*   CREATININE 1.3   < > 1.4 1.5* 1.6*   ALKPHOS 88  --  83  --   --    ALT 17  --  13  --   --    AST 21  --  16  --   --    BILITOT 0.7  --  0.4  --   --     < > = values in this interval not displayed.      Coagulation:   Recent Labs   Lab 10/25/19  0402 10/26/19  0435 10/27/19  0337   INR 2.3*  2.7* 2.5*   APTT 37.3* 36.2* 34.2*     LDH:  Recent Labs   Lab 10/25/19  0403 10/26/19  0435 10/27/19  0337    243 245     Microbiology:  Microbiology Results (last 7 days)     ** No results found for the last 168 hours. **          BMP:   Recent Labs   Lab 10/27/19  0337   *      K 4.6      CO2 25   BUN 21*   CREATININE 1.6*   CALCIUM 9.2   MG 2.1     Cardiac Markers: No results for input(s): CKMB, TROPONINT, MYOGLOBIN in the last 72 hours.  Coagulation:   Recent Labs   Lab 10/27/19  0337   INR 2.5*   APTT 34.2*     I have reviewed all pertinent labs within the past 24 hours.    Estimated Creatinine Clearance: 52.1 mL/min (A) (based on SCr of 1.6 mg/dL (H)).    Diagnostic Results:  I have reviewed all pertinent imaging results/findings within the past 24 hours.

## 2019-10-27 NOTE — PLAN OF CARE
Pt free of falls, injury this shift. POC reviewed with pt and family at bedside, verbalized understanding. Cr up to 1.6 today, holding Aldactone and Lisinopril. Transitioned to PO amio 10/26. Plan is for pt to go for RHC Monday. VS stable, no distress noted. Will continue to monitor pt.

## 2019-10-27 NOTE — PROGRESS NOTES
10/27/2019  Abhijit Perez    Current provider:  Eric Antunez Jr.,*      I, Abhijit Perez, rounded on Deborah Oliva Navas to ensure all mechanical assist device settings (IABP or VAD) were appropriate and all parameters were within limits.  I was able to ensure all back up equipment was present, the staff had no issues, and the Perfusion Department daily rounding was complete.    12:43 PM

## 2019-10-27 NOTE — PROGRESS NOTES
[facility]  Heart Transplant  Progress Note    Patient Name: Deborah Navas  MRN: [id number]  Admission Date: 10/24/2019  Hospital Length of Stay: 3 days  Attending Physician: Eric Antunez Jr.,*  Primary Care Provider: Primary Doctor No  Principal Problem:Ventricular tachycardia    Subjective:     Interval History: pt with new HM3 LVAD from 9/10/2019 DT, prior VF/ AICD shock, recent discharge on 10/20 -10/22: MMVT -> AICD shock. Now readmitted on 10/24 with VT shocks ( VT1 -> accelerated to VT2 -> ATP -> successful shocks. Second event VT 3, not amenable to ATP, had successful shock. Started with amiodarone 10/24 at 10 pm. Finished and changed to amiodarone 400 mg bid on 10/26. Appreciate EP follow up. Recommend 2 weeks until 11/09 and then 400 mg daily. RHC on Monday for listing purposes. Cr slightly trending up 1.4 -> 1.5 -> 1.6. Held am dose of lisinopril.       Continuous Infusions:  Scheduled Meds:   amiodarone  400 mg Oral BID    aspirin  325 mg Oral Daily    gabapentin  400 mg Oral BID    lisinopril  5 mg Oral BID    magnesium oxide  400 mg Oral Daily    mexiletine  150 mg Oral Q8H    mirtazapine  15 mg Oral QHS    pantoprazole  40 mg Oral Daily    [START ON 10/28/2019] spironolactone  25 mg Oral Daily    venlafaxine  150 mg Oral Daily    warfarin  3 mg Oral Every Tues, Thurs, Sat, Sun    warfarin  1.5 mg Oral Every Mon, Wed, Fri     PRN Meds:ALPRAZolam, ondansetron, oxyCODONE-acetaminophen, senna-docusate 8.6-50 mg    Review of patient's allergies indicates:   Allergen Reactions    Adhesive Blisters     Reaction to area in chest and up only    Codeine Itching     Objective:     Vital Signs (Most Recent):  Temp: 98.5 °F (36.9 °C) (10/27/19 1157)  Pulse: 96 (10/27/19 1157)  Resp: 18 (10/27/19 1157)  BP: 100/71 (10/27/19 1200)  SpO2: 97 % (10/27/19 1157) Vital Signs (24h Range):  Temp:  [97.4 °F (36.3 °C)-98.7 °F (37.1 °C)] 98.5 °F (36.9 °C)  Pulse:  [74-96]  96  Resp:  [16-18] 18  SpO2:  [94 %-99 %] 97 %  BP: ()/(0-76) 100/71     Patient Vitals for the past 72 hrs (Last 3 readings):   Weight   10/27/19 0403 103.7 kg (228 lb 9.9 oz)   10/26/19 0600 103.2 kg (227 lb 8.2 oz)   10/25/19 0433 102.2 kg (225 lb 5 oz)     Body mass index is 35.81 kg/m².      Intake/Output Summary (Last 24 hours) at 10/27/2019 1231  Last data filed at 10/27/2019 0403  Gross per 24 hour   Intake 570 ml   Output 1800 ml   Net -1230 ml       Hemodynamic Parameters:       Telemetry: No events    Physical Exam   Constitutional: She is oriented to person, place, and time. She appears well-developed and well-nourished.   HENT:   Mouth/Throat: Oropharynx is clear and moist.   Eyes: EOM are normal.   Neck: No JVD present.   Cardiovascular: Normal rate, regular rhythm and intact distal pulses.   Smooth VAD hum   Pulmonary/Chest: Effort normal and breath sounds normal. No respiratory distress. She has no rales.   Abdominal: Soft. Bowel sounds are normal. She exhibits no distension. There is no tenderness. There is no guarding.   Musculoskeletal: She exhibits no edema.   Neurological: She is alert and oriented to person, place, and time.   Skin: Skin is warm.   Psychiatric: She has a normal mood and affect.   Nursing note and vitals reviewed.      Significant Labs:  CBC:  Recent Labs   Lab 10/25/19  0403 10/26/19  0443 10/27/19  0337   WBC 5.39 4.68 5.05   RBC 3.91* 4.01 3.96*   HGB 10.1* 10.6* 10.1*   HCT 33.3* 35.4* 35.6*    345 314   MCV 85 88 90   MCH 25.8* 26.4* 25.5*   MCHC 30.3* 29.9* 28.4*     BNP:  Recent Labs   Lab 10/21/19  0049 10/21/19  0712 10/25/19  0402   * 276* 164*     CMP:  Recent Labs   Lab 10/21/19  0712  10/25/19  0403 10/26/19  0435 10/27/19  0337      < > 118* 117* 112*   CALCIUM 9.6   < > 9.3 9.3 9.2   ALBUMIN 3.4*  --  3.1*  --   --    PROT 7.2  --  6.7  --   --       < > 139 138 140   K 3.6   < > 3.6 4.3 4.6   CO2 27   < > 24 23 25      < >  104 105 106   BUN 18   < > 21* 20 21*   CREATININE 1.3   < > 1.4 1.5* 1.6*   ALKPHOS 88  --  83  --   --    ALT 17  --  13  --   --    AST 21  --  16  --   --    BILITOT 0.7  --  0.4  --   --     < > = values in this interval not displayed.      Coagulation:   Recent Labs   Lab 10/25/19  0402 10/26/19  0435 10/27/19  0337   INR 2.3* 2.7* 2.5*   APTT 37.3* 36.2* 34.2*     LDH:  Recent Labs   Lab 10/25/19  0403 10/26/19  0435 10/27/19  0337    243 245     Microbiology:  Microbiology Results (last 7 days)     ** No results found for the last 168 hours. **          BMP:   Recent Labs   Lab 10/27/19  0337   *      K 4.6      CO2 25   BUN 21*   CREATININE 1.6*   CALCIUM 9.2   MG 2.1     Cardiac Markers: No results for input(s): CKMB, TROPONINT, MYOGLOBIN in the last 72 hours.  Coagulation:   Recent Labs   Lab 10/27/19  0337   INR 2.5*   APTT 34.2*     I have reviewed all pertinent labs within the past 24 hours.    Estimated Creatinine Clearance: 52.1 mL/min (A) (based on SCr of 1.6 mg/dL (H)).    Diagnostic Results:  I have reviewed all pertinent imaging results/findings within the past 24 hours.    Assessment and Plan:     50 yo WF with NICM, s/p HM3 implantation 9/10/19 (post up coarse uncomplicated with the exception of+ diaphragmatic stimulation of LV lead and pleural effusion with chest tube placement, atrial flutter with NADINE/DCCV), V-fib reported in setting of hypokalemia with appropriate shock from ICD on Amiodarone prior to LVAD placement. Transferred from outside hospital for ICD shock x2 at home.     Recently admitted from 10/20-10/22 also for ICD shock. EP was consulted who deemed her ICD shock appropriate for MMVT. Patient was IV loaded with amiodarone and transitioned to PO prior to discharge. Lasix was decreased from QD to EOD due to concerns for hypovolemia. Her driveline was cultured due to pain and minimal drainage, no growth on cultures.     On 10/23, she was sitting down  watching TV when she suddenly felt a jumping sensation in her left lower chest followed by a shock. Denies LOC, dizziness or palpitations prior to this episode. After the shock she felt slightly nauseous. She called the clinic and was advised to go to ER if she had a second shock. Today she had a similar episode around 12 pm and went to her local ED. Otherwise doing well since discharged on Tuesday.     * Ventricular tachycardia  - Patient recently discharged after an episode of VT required ICD shock.   - Now readmitted after two ICD shocks. Per admission -ICD interrogation showed an episode of VT on 10/23 at 8:57 pm and one episode of VT->VF on 10/24 at 12:38 pm.   - Electrolytes WNL at time of event. Bedside echo with LV unchanged in size (no collapse).   - Loaded with IV amio, 10/24 -10/26. First dose of amiodarone 400 bid on 10/26 -> 2 weeks then on 11/09 change to 400 mg daily . Mexitil started overnight at 150 mg TID.  - Appreciate EP help  - Will get RHC in effort to pursue listing patient for transplant in case VT continues to be an issue.     Essential hypertension  - On lisinopril and spironolactone. Monitor cr  - Goal - doppler 60-90 mmHg    LVAD (left ventricular assist device) present  -HeartMate 3 Implanted 9/10/19 as DT.  -Implanted by Dr. Walden  -INR 2.3 -> 2.7 -> 2.5 today. 1.5 mg MWF, 3 mg TTSS Goal INR 2.0-3.0.   -Antiplatelets:  mg.  -LDH stable  -Speed set at 5200.  -Not listed for OHTx: was declined due to pulmonary hypertension. Will get RHC Monday if patient amenable in effort to move forward with listing.        Procedure: Device Interrogation Including analysis of device parameters  Current Settings: Ventricular Assist Device  Review of device function is stable    TXP LVAD INTERROGATIONS 10/27/2019 10/27/2019 10/27/2019 10/26/2019 10/26/2019 10/26/2019 10/26/2019   Type HeartMate3 HeartMate3 HeartMate3 HeartMate3 HeartMate3 HeartMate3 HeartMate3   Flow 3.5 3.2 3.4 3.7 3.7 3.3 3.5    Speed 5200 5200 5200 5200 5200 5200 5200   PI 5.5 5.6 6.3 4.5 4.5 5.8 5.3   Power (Orellana) 3.6 3.6 3.6 3.6 3.6 3.5 3.6   LSL 4800 4800 - - - - -   Pulsatility Intermittent pulse Intermittent pulse Pulse Pulse Pulse Intermittent pulse Intermittent pulse       Chronic systolic congestive heart failure  -Euvolemic on exam. Takes furosemide every other day. Held on admit  -Continue lisinopril and spironolactone. Held am dose, cr 1.6 from 1.4 two days ago, monitor.    Ventricular fibrillation  - see VT    D/W Dr. Samara Moreno MD  Heart Transplant  [facility]

## 2019-10-28 LAB
ALBUMIN SERPL BCP-MCNC: 3.4 G/DL (ref 3.5–5.2)
ALP SERPL-CCNC: 88 U/L (ref 55–135)
ALT SERPL W/O P-5'-P-CCNC: 13 U/L (ref 10–44)
ANION GAP SERPL CALC-SCNC: 9 MMOL/L (ref 8–16)
APTT BLDCRRT: 32.9 SEC (ref 21–32)
AST SERPL-CCNC: 14 U/L (ref 10–40)
BACTERIA SPEC ANAEROBE CULT: NORMAL
BASOPHILS # BLD AUTO: 0.06 K/UL (ref 0–0.2)
BASOPHILS NFR BLD: 1.2 % (ref 0–1.9)
BILIRUB DIRECT SERPL-MCNC: 0.3 MG/DL (ref 0.1–0.3)
BILIRUB SERPL-MCNC: 0.6 MG/DL (ref 0.1–1)
BNP SERPL-MCNC: 376 PG/ML (ref 0–99)
BUN SERPL-MCNC: 19 MG/DL (ref 6–20)
CALCIUM SERPL-MCNC: 9.4 MG/DL (ref 8.7–10.5)
CHLORIDE SERPL-SCNC: 108 MMOL/L (ref 95–110)
CO2 SERPL-SCNC: 27 MMOL/L (ref 23–29)
CREAT SERPL-MCNC: 1.3 MG/DL (ref 0.5–1.4)
CRP SERPL-MCNC: 18.3 MG/L (ref 0–8.2)
DIFFERENTIAL METHOD: ABNORMAL
EOSINOPHIL # BLD AUTO: 0.2 K/UL (ref 0–0.5)
EOSINOPHIL NFR BLD: 4.3 % (ref 0–8)
ERYTHROCYTE [DISTWIDTH] IN BLOOD BY AUTOMATED COUNT: 16.6 % (ref 11.5–14.5)
EST. GFR  (AFRICAN AMERICAN): 55.3 ML/MIN/1.73 M^2
EST. GFR  (NON AFRICAN AMERICAN): 48 ML/MIN/1.73 M^2
GLUCOSE SERPL-MCNC: 116 MG/DL (ref 70–110)
HCT VFR BLD AUTO: 36.6 % (ref 37–48.5)
HGB BLD-MCNC: 10.7 G/DL (ref 12–16)
IMM GRANULOCYTES # BLD AUTO: 0.01 K/UL (ref 0–0.04)
IMM GRANULOCYTES NFR BLD AUTO: 0.2 % (ref 0–0.5)
INR PPP: 2.6 (ref 0.8–1.2)
LDH SERPL L TO P-CCNC: 224 U/L (ref 110–260)
LYMPHOCYTES # BLD AUTO: 1 K/UL (ref 1–4.8)
LYMPHOCYTES NFR BLD: 19.7 % (ref 18–48)
MAGNESIUM SERPL-MCNC: 2.2 MG/DL (ref 1.6–2.6)
MCH RBC QN AUTO: 25.8 PG (ref 27–31)
MCHC RBC AUTO-ENTMCNC: 29.2 G/DL (ref 32–36)
MCV RBC AUTO: 88 FL (ref 82–98)
MONOCYTES # BLD AUTO: 0.3 K/UL (ref 0.3–1)
MONOCYTES NFR BLD: 5.1 % (ref 4–15)
NEUTROPHILS # BLD AUTO: 3.4 K/UL (ref 1.8–7.7)
NEUTROPHILS NFR BLD: 69.5 % (ref 38–73)
NRBC BLD-RTO: 0 /100 WBC
PHOSPHATE SERPL-MCNC: 2.8 MG/DL (ref 2.7–4.5)
PLATELET # BLD AUTO: 350 K/UL (ref 150–350)
PMV BLD AUTO: 9.8 FL (ref 9.2–12.9)
POTASSIUM SERPL-SCNC: 4.5 MMOL/L (ref 3.5–5.1)
PREALB SERPL-MCNC: 24 MG/DL (ref 20–43)
PROT SERPL-MCNC: 7 G/DL (ref 6–8.4)
PROTHROMBIN TIME: 25.5 SEC (ref 9–12.5)
RBC # BLD AUTO: 4.15 M/UL (ref 4–5.4)
SODIUM SERPL-SCNC: 144 MMOL/L (ref 136–145)
WBC # BLD AUTO: 4.88 K/UL (ref 3.9–12.7)

## 2019-10-28 PROCEDURE — 84100 ASSAY OF PHOSPHORUS: CPT | Mod: NTX

## 2019-10-28 PROCEDURE — 25000003 PHARM REV CODE 250: Mod: NTX | Performed by: PHYSICIAN ASSISTANT

## 2019-10-28 PROCEDURE — 93750 PR INTERROGATE VENT ASSIST DEV, IN PERSON, W PHYSICIAN ANALYSIS: ICD-10-PCS | Mod: NTX,,, | Performed by: INTERNAL MEDICINE

## 2019-10-28 PROCEDURE — 99233 PR SUBSEQUENT HOSPITAL CARE,LEVL III: ICD-10-PCS | Mod: NTX,,, | Performed by: INTERNAL MEDICINE

## 2019-10-28 PROCEDURE — 80076 HEPATIC FUNCTION PANEL: CPT | Mod: NTX

## 2019-10-28 PROCEDURE — 27000248 HC VAD-ADDITIONAL DAY: Mod: NTX

## 2019-10-28 PROCEDURE — 20600001 HC STEP DOWN PRIVATE ROOM: Mod: NTX

## 2019-10-28 PROCEDURE — 25000003 PHARM REV CODE 250: Mod: NTX | Performed by: INTERNAL MEDICINE

## 2019-10-28 PROCEDURE — 83615 LACTATE (LD) (LDH) ENZYME: CPT | Mod: NTX

## 2019-10-28 PROCEDURE — 86140 C-REACTIVE PROTEIN: CPT | Mod: NTX

## 2019-10-28 PROCEDURE — 84134 ASSAY OF PREALBUMIN: CPT | Mod: NTX

## 2019-10-28 PROCEDURE — 85730 THROMBOPLASTIN TIME PARTIAL: CPT | Mod: NTX

## 2019-10-28 PROCEDURE — 83880 ASSAY OF NATRIURETIC PEPTIDE: CPT | Mod: NTX

## 2019-10-28 PROCEDURE — 99233 SBSQ HOSP IP/OBS HIGH 50: CPT | Mod: NTX,,, | Performed by: INTERNAL MEDICINE

## 2019-10-28 PROCEDURE — 83735 ASSAY OF MAGNESIUM: CPT | Mod: NTX

## 2019-10-28 PROCEDURE — 85025 COMPLETE CBC W/AUTO DIFF WBC: CPT | Mod: NTX

## 2019-10-28 PROCEDURE — 36415 COLL VENOUS BLD VENIPUNCTURE: CPT | Mod: NTX

## 2019-10-28 PROCEDURE — 93750 INTERROGATION VAD IN PERSON: CPT | Mod: NTX,,, | Performed by: INTERNAL MEDICINE

## 2019-10-28 PROCEDURE — 85610 PROTHROMBIN TIME: CPT | Mod: NTX

## 2019-10-28 PROCEDURE — 63600175 PHARM REV CODE 636 W HCPCS: Mod: NTX | Performed by: STUDENT IN AN ORGANIZED HEALTH CARE EDUCATION/TRAINING PROGRAM

## 2019-10-28 PROCEDURE — 80048 BASIC METABOLIC PNL TOTAL CA: CPT | Mod: NTX

## 2019-10-28 RX ORDER — ADHESIVE BANDAGE
30 BANDAGE TOPICAL DAILY PRN
Status: DISCONTINUED | OUTPATIENT
Start: 2019-10-28 | End: 2019-10-30 | Stop reason: HOSPADM

## 2019-10-28 RX ORDER — WARFARIN 1 MG/1
1 TABLET ORAL
Status: DISCONTINUED | OUTPATIENT
Start: 2019-10-28 | End: 2019-10-29

## 2019-10-28 RX ORDER — HYDRALAZINE HYDROCHLORIDE 20 MG/ML
10 INJECTION INTRAMUSCULAR; INTRAVENOUS ONCE
Status: COMPLETED | OUTPATIENT
Start: 2019-10-28 | End: 2019-10-28

## 2019-10-28 RX ADMIN — WARFARIN SODIUM 1 MG: 1 TABLET ORAL at 04:10

## 2019-10-28 RX ADMIN — AMIODARONE HYDROCHLORIDE 400 MG: 200 TABLET ORAL at 09:10

## 2019-10-28 RX ADMIN — MAGNESIUM HYDROXIDE 2400 MG: 400 SUSPENSION ORAL at 05:10

## 2019-10-28 RX ADMIN — Medication 400 MG: at 09:10

## 2019-10-28 RX ADMIN — SPIRONOLACTONE 25 MG: 25 TABLET, FILM COATED ORAL at 09:10

## 2019-10-28 RX ADMIN — LISINOPRIL 5 MG: 5 TABLET ORAL at 08:10

## 2019-10-28 RX ADMIN — VENLAFAXINE 150 MG: 37.5 TABLET ORAL at 09:10

## 2019-10-28 RX ADMIN — SENNOSIDES, DOCUSATE SODIUM 2 TABLET: 50; 8.6 TABLET, FILM COATED ORAL at 05:10

## 2019-10-28 RX ADMIN — HYDRALAZINE HYDROCHLORIDE 10 MG: 20 INJECTION INTRAMUSCULAR; INTRAVENOUS at 01:10

## 2019-10-28 RX ADMIN — MEXILETINE HYDROCHLORIDE 150 MG: 150 CAPSULE ORAL at 02:10

## 2019-10-28 RX ADMIN — AMIODARONE HYDROCHLORIDE 400 MG: 200 TABLET ORAL at 08:10

## 2019-10-28 RX ADMIN — LISINOPRIL 5 MG: 5 TABLET ORAL at 09:10

## 2019-10-28 RX ADMIN — ASPIRIN 325 MG: 325 TABLET, DELAYED RELEASE ORAL at 09:10

## 2019-10-28 RX ADMIN — PANTOPRAZOLE SODIUM 40 MG: 40 TABLET, DELAYED RELEASE ORAL at 09:10

## 2019-10-28 RX ADMIN — MIRTAZAPINE 15 MG: 15 TABLET, FILM COATED ORAL at 08:10

## 2019-10-28 RX ADMIN — GABAPENTIN 400 MG: 100 CAPSULE ORAL at 09:10

## 2019-10-28 RX ADMIN — MEXILETINE HYDROCHLORIDE 150 MG: 150 CAPSULE ORAL at 05:10

## 2019-10-28 RX ADMIN — GABAPENTIN 400 MG: 100 CAPSULE ORAL at 08:10

## 2019-10-28 RX ADMIN — MEXILETINE HYDROCHLORIDE 150 MG: 150 CAPSULE ORAL at 09:10

## 2019-10-28 NOTE — ASSESSMENT & PLAN NOTE
-Euvolemic on exam. Takes furosemide every other day. Held on admit  -Continue Lisinopril and Spironolactone and will hold if Scr increases tomorrow

## 2019-10-28 NOTE — PROGRESS NOTES
10/28/19 0100   Device   Type HeartMate3   Flow 2.4   Speed 5200   PI 3.6   Power (Orellana) 9.6   LSL 4800   Pulsatility Pulse   Pt had low flow of 2.4. Pt asymptomatic, asleep on side. [facility] made aware. BP elevated with MAP of 102. Orders to admin hydralazine 10 mg IVP once.

## 2019-10-28 NOTE — PROGRESS NOTES
[facility]  Heart Transplant  Progress Note    Patient Name: Deborah Navas  MRN: [id number]  Admission Date: 10/24/2019  Hospital Length of Stay: 4 days  Attending Physician: Eric Antunez Jr.,*  Primary Care Provider: Primary Doctor No  Principal Problem:Ventricular tachycardia    Subjective:     Interval History: Overnight with low flow alarm in the setting of HTN () post IV Hydralazine 10 mg x1. LDH stable. No further VT/shock events since admission. Interrogation on admission shows VT shocks ( VT1 -> accelerated to VT2 -> ATP -> successful shocks. Second event VT 3, not amenable to ATP, had successful shock. Post Amiodarone IV bolus now on  mg BID until 11/09 then daily. Also on Mexiletine 150 mg TID. Scr resolved with increased oral fluids to 1.3. Today she received Lisinopril and Aldactone which were held yesterday.  -Planning on RHC for listing purposes  -Monitor renal function and consider holding further use of Aldactone if Scr again elevated  -Monitor for HTN given low flow alarm (likely from holding meds on 10/27)  -Today she will get a reduce Coumadin for an INR adequate for RHC tomorrow    Continuous Infusions:  Scheduled Meds:   amiodarone  400 mg Oral BID    aspirin  325 mg Oral Daily    gabapentin  400 mg Oral BID    lisinopril  5 mg Oral BID    magnesium oxide  400 mg Oral Daily    mexiletine  150 mg Oral Q8H    mirtazapine  15 mg Oral QHS    pantoprazole  40 mg Oral Daily    spironolactone  25 mg Oral Daily    venlafaxine  150 mg Oral Daily    warfarin  3 mg Oral Every Tues, Thurs, Sat, Sun    warfarin  1.5 mg Oral Every Mon, Wed, Fri     PRN Meds:ALPRAZolam, ondansetron, oxyCODONE-acetaminophen, senna-docusate 8.6-50 mg    Review of patient's allergies indicates:   Allergen Reactions    Adhesive Blisters     Reaction to area in chest and up only    Codeine Itching     Objective:     Vital Signs (Most Recent):  Temp: 98.6 °F (37 °C)  (10/28/19 0907)  Pulse: 108 (10/28/19 0907)  Resp: 16 (10/28/19 0907)  BP: (!) 72/0 (10/28/19 0907)  SpO2: 98 % (10/28/19 0907) Vital Signs (24h Range):  Temp:  [97.6 °F (36.4 °C)-98.6 °F (37 °C)] 98.6 °F (37 °C)  Pulse:  [] 108  Resp:  [16-18] 16  SpO2:  [94 %-99 %] 98 %  BP: ()/(0-93) 72/0     Patient Vitals for the past 72 hrs (Last 3 readings):   Weight   10/28/19 0527 104.3 kg (230 lb 0.8 oz)   10/27/19 0403 103.7 kg (228 lb 9.9 oz)   10/26/19 0600 103.2 kg (227 lb 8.2 oz)     Body mass index is 36.03 kg/m².      Intake/Output Summary (Last 24 hours) at 10/28/2019 1159  Last data filed at 10/28/2019 0926  Gross per 24 hour   Intake 1480 ml   Output 2400 ml   Net -920 ml       Hemodynamic Parameters:       Telemetry: No events    Physical Exam   Constitutional: She is oriented to person, place, and time. She appears well-developed and well-nourished.   HENT:   Mouth/Throat: Oropharynx is clear and moist.   Eyes: EOM are normal.   Neck: No JVD present.   Cardiovascular: Normal rate, regular rhythm and intact distal pulses.   Smooth VAD hum   Pulmonary/Chest: Effort normal and breath sounds normal. No respiratory distress. She has no rales.   Abdominal: Soft. Bowel sounds are normal. She exhibits no distension. There is no tenderness. There is no guarding.   Musculoskeletal: She exhibits no edema.   Neurological: She is alert and oriented to person, place, and time.   Skin: Skin is warm.   Psychiatric: She has a normal mood and affect.   Nursing note and vitals reviewed.      Significant Labs:  CBC:  Recent Labs   Lab 10/26/19  0443 10/27/19  0337 10/28/19  0659   WBC 4.68 5.05 4.88   RBC 4.01 3.96* 4.15   HGB 10.6* 10.1* 10.7*   HCT 35.4* 35.6* 36.6*    314 350   MCV 88 90 88   MCH 26.4* 25.5* 25.8*   MCHC 29.9* 28.4* 29.2*     BNP:  Recent Labs   Lab 10/25/19  0402 10/28/19  0659   * 376*     CMP:  Recent Labs   Lab 10/25/19  0403  10/27/19  0337 10/27/19  1627 10/28/19  0659   *    < > 112* 117* 116*   CALCIUM 9.3   < > 9.2 9.4 9.4   ALBUMIN 3.1*  --   --   --  3.4*   PROT 6.7  --   --   --  7.0      < > 140 140 144   K 3.6   < > 4.6 4.0 4.5   CO2 24   < > 25 24 27      < > 106 106 108   BUN 21*   < > 21* 22* 19   CREATININE 1.4   < > 1.6* 1.6* 1.3   ALKPHOS 83  --   --   --  88   ALT 13  --   --   --  13   AST 16  --   --   --  14   BILITOT 0.4  --   --   --  0.6    < > = values in this interval not displayed.      Coagulation:   Recent Labs   Lab 10/26/19  0435 10/27/19  0337 10/28/19  0659   INR 2.7* 2.5* 2.6*   APTT 36.2* 34.2* 32.9*     LDH:  Recent Labs   Lab 10/26/19  0435 10/27/19  0337 10/28/19  0659    245 224     Microbiology:  Microbiology Results (last 7 days)     ** No results found for the last 168 hours. **          BMP:   Recent Labs   Lab 10/28/19  0659   *      K 4.5      CO2 27   BUN 19   CREATININE 1.3   CALCIUM 9.4   MG 2.2     Cardiac Markers: No results for input(s): CKMB, TROPONINT, MYOGLOBIN in the last 72 hours.  Coagulation:   Recent Labs   Lab 10/28/19  0659   INR 2.6*   APTT 32.9*     I have reviewed all pertinent labs within the past 24 hours.    Estimated Creatinine Clearance: 64.3 mL/min (based on SCr of 1.3 mg/dL).    Diagnostic Results:  I have reviewed all pertinent imaging results/findings within the past 24 hours.    Assessment and Plan:     48 yo WF with NICM, s/p HM3 implantation 9/10/19 (post up coarse uncomplicated with the exception of+ diaphragmatic stimulation of LV lead and pleural effusion with chest tube placement, atrial flutter with NADINE/DCCV), V-fib reported in setting of hypokalemia with appropriate shock from ICD on Amiodarone prior to LVAD placement. Transferred from outside hospital for ICD shock x2 at home.     Recently admitted from 10/20-10/22 also for ICD shock. EP was consulted who deemed her ICD shock appropriate for MMVT. Patient was IV loaded with amiodarone and transitioned to PO prior to  discharge. Lasix was decreased from QD to EOD due to concerns for hypovolemia. Her driveline was cultured due to pain and minimal drainage, no growth on cultures.     On 10/23, she was sitting down watching TV when she suddenly felt a jumping sensation in her left lower chest followed by a shock. Denies LOC, dizziness or palpitations prior to this episode. After the shock she felt slightly nauseous. She called the clinic and was advised to go to ER if she had a second shock. Today she had a similar episode around 12 pm and went to her local ED. Otherwise doing well since discharged on Tuesday.     * Ventricular tachycardia  - Patient recently discharged after an episode of VT required ICD shock.   - Now readmitted after two ICD shocks. Per admission -ICD interrogation showed an episode of VT on 10/23 at 8:57 pm and one episode of VT->VF on 10/24 at 12:38 pm.   - Electrolytes WNL at time of event. Bedside echo with LV unchanged in size (no collapse).   - Loaded with IV amio, 10/24 -10/26. First dose of amiodarone 400 bid on 10/26 -> 2 weeks then on 11/09 change to 400 mg daily . Mexitil started at 150 mg TID.  - Appreciate EP help  - Will get RHC in effort to pursue listing patient for transplant in case VT continues to be an issue.     Essential hypertension  - On lisinopril and spironolactone. Monitor cr  - Goal - doppler 60-90 mmHg    LVAD (left ventricular assist device) present  -HeartMate 3 Implanted 9/10/19 as DT.  -Implanted by Dr. Walden  -INR 2.3 -> 2.7 -> 2.6 today. 1.5 mg MWF (will decrease to 1 mg today), 3 mg TTSS Goal INR 2.0-3.0.   -Antiplatelets:  mg.  -LDH stable  -Speed set at 5200.  -Not listed for OHTx: was declined due to pulmonary hypertension. Will get RHC in effort to move forward with listing.        Procedure: Device Interrogation Including analysis of device parameters  Current Settings: Ventricular Assist Device  Review of device function is stable    TXP LVAD INTERROGATIONS  10/28/2019 10/28/2019 10/28/2019 10/28/2019 10/28/2019 10/27/2019 10/27/2019   Type HeartMate3 HeartMate3 HeartMate3 HeartMate3 HeartMate3 HeartMate3 HeartMate3   Flow 4.1 3.7 3.2 2.4 3.3 3.5 3.5   Speed 5200 5200 5200 5200 5200 5200 5200   PI 3.4 4.7 7 3.6 6.7 5.0 4.4   Power (Orellana) 3.6 3.5 3.6 9.6 3.6 3.5 3.6   LSL 4800 - - 4800 - - 4800   Pulsatility - - - Pulse - - Intermittent pulse       Chronic systolic congestive heart failure  -Euvolemic on exam. Takes furosemide every other day. Held on admit  -Continue Lisinopril and Spironolactone and will hold if Scr increases tomorrow     Ventricular fibrillation  - see VT        Dyllan Herzog MD  Heart Transplant  [facility]

## 2019-10-28 NOTE — PROGRESS NOTES
Update:    SW met with pt and pt's mother in pt's room in order to provide continuity of care and support. Pt was AAOx4 and reports that she is coping much better this morning compared to last week. Pt endorses anxiety and states that team was able to prescribe low dose Xanax. Pt reports that this has been helpful with regard to the anxiety she was experiencing due to medical issues. Pt and mother both updated on heart transplant education. Pt under impression that she was not checked off by SW for caregivers, however pt's mother confirmed that she was present as the back up caregiver for both LVAD and transplant education during psychosocial assessment. Pt and mother denied questions about transplant and are aware that pt will need to stay locally for 4-6 weeks with 24/7 care once discharged after transplant.     Pt and mother denying current needs from SW at this time. Pt reports that her job is holding her position once cleared to return to work. Pt reports that she is looking forward to this. Pt will need resumption of HH with [facility] once cleared for discharge.     SW remains available for continued psychosocial support, education, resources, and additional d/c planning as needed.

## 2019-10-28 NOTE — PROGRESS NOTES
10/28/2019  Terra Porter    Current provider:  Eric Antunez Jr.,*      I, Terra Porter, rounded on Deobrah Navas to ensure all mechanical assist device settings (IABP or VAD) were appropriate and all parameters were within limits.  I was able to ensure all back up equipment was present, the staff had no issues, and the Perfusion Department daily rounding was complete.    9:25 AM

## 2019-10-28 NOTE — NURSING
Per , OK to order additional eval testing while pt is hospitalized.   Per Dr Bryant, will wait until after RHC to confirm improvement of PA pressures before additional testing is ordered.     Pt will need:  RHC w/ acceptable PA pressures,  Mammogram, Colonoscopy, updated CT c/a/p, PFTs, updated serologies & pra (s/p PRBC w/ vad implant), ID consult for transplant clearance.      Per BENIGNO, pt remains suitable for listing      Will await further orders.

## 2019-10-28 NOTE — PLAN OF CARE
Pt free of falls and injury during shift. POC reviewed with pt. VS stable. SR on telemetry, no VT noted on tele. Pt had low flow alarm possibly due to HTN, MD made aware, see note. No complaints. Educated pt why they are at risk for falls and to use call light for assistance ambulating. Yellow non-slip socks on pt. Bed low and locked, call light with in reach. Will continue to monitor.

## 2019-10-28 NOTE — NURSING
Met with pt and mom at bedside.  Informed pt that additional testing for OHT listing can be ordered once PA pressures are confirmed with RHC.  Pt is aware of all testing needed and is agreeable.

## 2019-10-28 NOTE — PLAN OF CARE
RHC postponed. INR 2.6 and BUN/Creatine 1.3/19. Warfarin decreased to 1mg MWF. Hm3 SP 5200 F 3.6. No flows since last night. Mexitil 150 mg to treat arrhythmia. H/H 36.6/10.7. POC reviewed with pt and pt verbalized understanding. VSS, AOX4, no falls, SOB, or complaints of pain. Pt in bed at lowest position with 2x upper side rails raised, call light within reach. Will continue to monitor.

## 2019-10-28 NOTE — ASSESSMENT & PLAN NOTE
-HeartMate 3 Implanted 9/10/19 as DT.  -Implanted by Dr. Walden  -INR 2.3 -> 2.7 -> 2.6 today. 1.5 mg MWF (will decrease to 1 mg today), 3 mg TTSS Goal INR 2.0-3.0.   -Antiplatelets:  mg.  -LDH stable  -Speed set at 5200.  -Not listed for OHTx: was declined due to pulmonary hypertension. Will get RHC in effort to move forward with listing.        Procedure: Device Interrogation Including analysis of device parameters  Current Settings: Ventricular Assist Device  Review of device function is stable    TXP LVAD INTERROGATIONS 10/28/2019 10/28/2019 10/28/2019 10/28/2019 10/28/2019 10/27/2019 10/27/2019   Type HeartMate3 HeartMate3 HeartMate3 HeartMate3 HeartMate3 HeartMate3 HeartMate3   Flow 4.1 3.7 3.2 2.4 3.3 3.5 3.5   Speed 5200 5200 5200 5200 5200 5200 5200   PI 3.4 4.7 7 3.6 6.7 5.0 4.4   Power (Orellana) 3.6 3.5 3.6 9.6 3.6 3.5 3.6   LSL 4800 - - 4800 - - 4800   Pulsatility - - - Pulse - - Intermittent pulse

## 2019-10-28 NOTE — SUBJECTIVE & OBJECTIVE
Interval History: Overnight with low flow alarm in the setting of HTN () post IV Hydralazine 10 mg x1. LDH stable. No further VT/shock events since admission. Interrogation on admission shows VT shocks ( VT1 -> accelerated to VT2 -> ATP -> successful shocks. Second event VT 3, not amenable to ATP, had successful shock. Post Amiodarone IV bolus now on  mg BID until 11/09 then daily. Also on Mexiletine 150 mg TID. Scr resolved with increased oral fluids to 1.3. Today she received Lisinopril and Aldactone which were held yesterday.  -Planning on RHC for listing purposes  -Monitor renal function and consider holding further use of Aldactone if Scr again elevated  -Monitor for HTN given low flow alarm (likely from holding meds on 10/27)  -Today she will get a reduce Coumadin for an INR adequate for RHC tomorrow    Continuous Infusions:  Scheduled Meds:   amiodarone  400 mg Oral BID    aspirin  325 mg Oral Daily    gabapentin  400 mg Oral BID    lisinopril  5 mg Oral BID    magnesium oxide  400 mg Oral Daily    mexiletine  150 mg Oral Q8H    mirtazapine  15 mg Oral QHS    pantoprazole  40 mg Oral Daily    spironolactone  25 mg Oral Daily    venlafaxine  150 mg Oral Daily    warfarin  3 mg Oral Every Tues, Thurs, Sat, Sun    warfarin  1.5 mg Oral Every Mon, Wed, Fri     PRN Meds:ALPRAZolam, ondansetron, oxyCODONE-acetaminophen, senna-docusate 8.6-50 mg    Review of patient's allergies indicates:   Allergen Reactions    Adhesive Blisters     Reaction to area in chest and up only    Codeine Itching     Objective:     Vital Signs (Most Recent):  Temp: 98.6 °F (37 °C) (10/28/19 0907)  Pulse: 108 (10/28/19 0907)  Resp: 16 (10/28/19 0907)  BP: (!) 72/0 (10/28/19 0907)  SpO2: 98 % (10/28/19 0907) Vital Signs (24h Range):  Temp:  [97.6 °F (36.4 °C)-98.6 °F (37 °C)] 98.6 °F (37 °C)  Pulse:  [] 108  Resp:  [16-18] 16  SpO2:  [94 %-99 %] 98 %  BP: ()/(0-93) 72/0     Patient Vitals for the past  72 hrs (Last 3 readings):   Weight   10/28/19 0527 104.3 kg (230 lb 0.8 oz)   10/27/19 0403 103.7 kg (228 lb 9.9 oz)   10/26/19 0600 103.2 kg (227 lb 8.2 oz)     Body mass index is 36.03 kg/m².      Intake/Output Summary (Last 24 hours) at 10/28/2019 1159  Last data filed at 10/28/2019 0926  Gross per 24 hour   Intake 1480 ml   Output 2400 ml   Net -920 ml       Hemodynamic Parameters:       Telemetry: No events    Physical Exam   Constitutional: She is oriented to person, place, and time. She appears well-developed and well-nourished.   HENT:   Mouth/Throat: Oropharynx is clear and moist.   Eyes: EOM are normal.   Neck: No JVD present.   Cardiovascular: Normal rate, regular rhythm and intact distal pulses.   Smooth VAD hum   Pulmonary/Chest: Effort normal and breath sounds normal. No respiratory distress. She has no rales.   Abdominal: Soft. Bowel sounds are normal. She exhibits no distension. There is no tenderness. There is no guarding.   Musculoskeletal: She exhibits no edema.   Neurological: She is alert and oriented to person, place, and time.   Skin: Skin is warm.   Psychiatric: She has a normal mood and affect.   Nursing note and vitals reviewed.      Significant Labs:  CBC:  Recent Labs   Lab 10/26/19  0443 10/27/19  0337 10/28/19  0659   WBC 4.68 5.05 4.88   RBC 4.01 3.96* 4.15   HGB 10.6* 10.1* 10.7*   HCT 35.4* 35.6* 36.6*    314 350   MCV 88 90 88   MCH 26.4* 25.5* 25.8*   MCHC 29.9* 28.4* 29.2*     BNP:  Recent Labs   Lab 10/25/19  0402 10/28/19  0659   * 376*     CMP:  Recent Labs   Lab 10/25/19  0403  10/27/19  0337 10/27/19  1627 10/28/19  0659   *   < > 112* 117* 116*   CALCIUM 9.3   < > 9.2 9.4 9.4   ALBUMIN 3.1*  --   --   --  3.4*   PROT 6.7  --   --   --  7.0      < > 140 140 144   K 3.6   < > 4.6 4.0 4.5   CO2 24   < > 25 24 27      < > 106 106 108   BUN 21*   < > 21* 22* 19   CREATININE 1.4   < > 1.6* 1.6* 1.3   ALKPHOS 83  --   --   --  88   ALT 13  --   --    --  13   AST 16  --   --   --  14   BILITOT 0.4  --   --   --  0.6    < > = values in this interval not displayed.      Coagulation:   Recent Labs   Lab 10/26/19  0435 10/27/19  0337 10/28/19  0659   INR 2.7* 2.5* 2.6*   APTT 36.2* 34.2* 32.9*     LDH:  Recent Labs   Lab 10/26/19  0435 10/27/19  0337 10/28/19  0659    245 224     Microbiology:  Microbiology Results (last 7 days)     ** No results found for the last 168 hours. **          BMP:   Recent Labs   Lab 10/28/19  0659   *      K 4.5      CO2 27   BUN 19   CREATININE 1.3   CALCIUM 9.4   MG 2.2     Cardiac Markers: No results for input(s): CKMB, TROPONINT, MYOGLOBIN in the last 72 hours.  Coagulation:   Recent Labs   Lab 10/28/19  0659   INR 2.6*   APTT 32.9*     I have reviewed all pertinent labs within the past 24 hours.    Estimated Creatinine Clearance: 64.3 mL/min (based on SCr of 1.3 mg/dL).    Diagnostic Results:  I have reviewed all pertinent imaging results/findings within the past 24 hours.

## 2019-10-29 LAB
ANION GAP SERPL CALC-SCNC: 10 MMOL/L (ref 8–16)
APTT BLDCRRT: 33.7 SEC (ref 21–32)
ASCENDING AORTA: 2.88 CM
BASOPHILS # BLD AUTO: 0.06 K/UL (ref 0–0.2)
BASOPHILS NFR BLD: 0.9 % (ref 0–1.9)
BSA FOR ECHO PROCEDURE: 2.2 M2
BUN SERPL-MCNC: 23 MG/DL (ref 6–20)
CALCIUM SERPL-MCNC: 9.2 MG/DL (ref 8.7–10.5)
CHLORIDE SERPL-SCNC: 106 MMOL/L (ref 95–110)
CO2 SERPL-SCNC: 24 MMOL/L (ref 23–29)
CREAT SERPL-MCNC: 1.5 MG/DL (ref 0.5–1.4)
CV ECHO LV RWT: 0.33 CM
DIFFERENTIAL METHOD: ABNORMAL
DOP CALC LVOT AREA: 3 CM2
DOP CALC LVOT DIAMETER: 1.95 CM
E WAVE DECELERATION TIME: 119.03 MSEC
E/A RATIO: 3.46
E/E' RATIO: 14.22 M/S
ECHO LV POSTERIOR WALL: 0.86 CM (ref 0.6–1.1)
EOSINOPHIL # BLD AUTO: 0.3 K/UL (ref 0–0.5)
EOSINOPHIL NFR BLD: 3.7 % (ref 0–8)
ERYTHROCYTE [DISTWIDTH] IN BLOOD BY AUTOMATED COUNT: 16.9 % (ref 11.5–14.5)
EST. GFR  (AFRICAN AMERICAN): 46.5 ML/MIN/1.73 M^2
EST. GFR  (NON AFRICAN AMERICAN): 40.3 ML/MIN/1.73 M^2
FRACTIONAL SHORTENING: 10 % (ref 28–44)
GLUCOSE SERPL-MCNC: 115 MG/DL (ref 70–110)
HCT VFR BLD AUTO: 36.3 % (ref 37–48.5)
HGB BLD-MCNC: 10.7 G/DL (ref 12–16)
IMM GRANULOCYTES # BLD AUTO: 0.03 K/UL (ref 0–0.04)
IMM GRANULOCYTES NFR BLD AUTO: 0.4 % (ref 0–0.5)
INR PPP: 3.4 (ref 0.8–1.2)
INTERVENTRICULAR SEPTUM: 0.55 CM (ref 0.6–1.1)
IVRT: 0.13 MSEC
LA MAJOR: 5.92 CM
LA MINOR: 5.91 CM
LA WIDTH: 3.79 CM
LDH SERPL L TO P-CCNC: 276 U/L (ref 110–260)
LEFT ATRIUM SIZE: 3.08 CM
LEFT ATRIUM VOLUME INDEX: 27.6 ML/M2
LEFT ATRIUM VOLUME: 58.69 CM3
LEFT INTERNAL DIMENSION IN SYSTOLE: 4.71 CM (ref 2.1–4)
LEFT VENTRICLE DIASTOLIC VOLUME INDEX: 61.97 ML/M2
LEFT VENTRICLE DIASTOLIC VOLUME: 131.99 ML
LEFT VENTRICLE MASS INDEX: 59 G/M2
LEFT VENTRICLE SYSTOLIC VOLUME INDEX: 48.2 ML/M2
LEFT VENTRICLE SYSTOLIC VOLUME: 102.71 ML
LEFT VENTRICULAR INTERNAL DIMENSION IN DIASTOLE: 5.24 CM (ref 3.5–6)
LEFT VENTRICULAR MASS: 125.57 G
LV LATERAL E/E' RATIO: 12.8 M/S
LV SEPTAL E/E' RATIO: 16 M/S
LYMPHOCYTES # BLD AUTO: 1.2 K/UL (ref 1–4.8)
LYMPHOCYTES NFR BLD: 18.1 % (ref 18–48)
MAGNESIUM SERPL-MCNC: 2.5 MG/DL (ref 1.6–2.6)
MCH RBC QN AUTO: 25.7 PG (ref 27–31)
MCHC RBC AUTO-ENTMCNC: 29.5 G/DL (ref 32–36)
MCV RBC AUTO: 87 FL (ref 82–98)
MONOCYTES # BLD AUTO: 0.4 K/UL (ref 0.3–1)
MONOCYTES NFR BLD: 5.8 % (ref 4–15)
MV PEAK A VEL: 0.37 M/S
MV PEAK E VEL: 1.28 M/S
NEUTROPHILS # BLD AUTO: 4.8 K/UL (ref 1.8–7.7)
NEUTROPHILS NFR BLD: 71.1 % (ref 38–73)
NRBC BLD-RTO: 0 /100 WBC
PHOSPHATE SERPL-MCNC: 2.8 MG/DL (ref 2.7–4.5)
PISA TR MAX VEL: 1.76 M/S
PLATELET # BLD AUTO: 373 K/UL (ref 150–350)
PMV BLD AUTO: 9.8 FL (ref 9.2–12.9)
POTASSIUM SERPL-SCNC: 4.6 MMOL/L (ref 3.5–5.1)
PROTHROMBIN TIME: 33.5 SEC (ref 9–12.5)
PULM VEIN S/D RATIO: 1.43
PV PEAK D VEL: 0.37 M/S
PV PEAK S VEL: 0.53 M/S
RA MAJOR: 5.54 CM
RA PRESSURE: 15 MMHG
RA WIDTH: 4.54 CM
RBC # BLD AUTO: 4.16 M/UL (ref 4–5.4)
RIGHT VENTRICULAR END-DIASTOLIC DIMENSION: 4.25 CM
RV TISSUE DOPPLER FREE WALL SYSTOLIC VELOCITY 1 (APICAL 4 CHAMBER VIEW): 6.66 CM/S
SINUS: 3.08 CM
SODIUM SERPL-SCNC: 140 MMOL/L (ref 136–145)
STJ: 2.57 CM
TDI LATERAL: 0.1 M/S
TDI SEPTAL: 0.08 M/S
TDI: 0.09 M/S
TR MAX PG: 12 MMHG
TRICUSPID ANNULAR PLANE SYSTOLIC EXCURSION: 1.77 CM
TV REST PULMONARY ARTERY PRESSURE: 27 MMHG
WBC # BLD AUTO: 6.69 K/UL (ref 3.9–12.7)

## 2019-10-29 PROCEDURE — 85025 COMPLETE CBC W/AUTO DIFF WBC: CPT | Mod: NTX

## 2019-10-29 PROCEDURE — 99233 SBSQ HOSP IP/OBS HIGH 50: CPT | Mod: NTX,,, | Performed by: INTERNAL MEDICINE

## 2019-10-29 PROCEDURE — 84100 ASSAY OF PHOSPHORUS: CPT | Mod: NTX

## 2019-10-29 PROCEDURE — 83615 LACTATE (LD) (LDH) ENZYME: CPT | Mod: NTX

## 2019-10-29 PROCEDURE — 63600175 PHARM REV CODE 636 W HCPCS: Mod: NTX | Performed by: PHYSICIAN ASSISTANT

## 2019-10-29 PROCEDURE — 25000003 PHARM REV CODE 250: Mod: NTX | Performed by: INTERNAL MEDICINE

## 2019-10-29 PROCEDURE — 85730 THROMBOPLASTIN TIME PARTIAL: CPT | Mod: NTX

## 2019-10-29 PROCEDURE — 20600001 HC STEP DOWN PRIVATE ROOM: Mod: NTX

## 2019-10-29 PROCEDURE — 93750 PR INTERROGATE VENT ASSIST DEV, IN PERSON, W PHYSICIAN ANALYSIS: ICD-10-PCS | Mod: NTX,,, | Performed by: INTERNAL MEDICINE

## 2019-10-29 PROCEDURE — 25000003 PHARM REV CODE 250: Mod: NTX | Performed by: PHYSICIAN ASSISTANT

## 2019-10-29 PROCEDURE — 36415 COLL VENOUS BLD VENIPUNCTURE: CPT | Mod: NTX

## 2019-10-29 PROCEDURE — 27000248 HC VAD-ADDITIONAL DAY: Mod: NTX

## 2019-10-29 PROCEDURE — 93750 INTERROGATION VAD IN PERSON: CPT | Mod: NTX,,, | Performed by: INTERNAL MEDICINE

## 2019-10-29 PROCEDURE — 99233 PR SUBSEQUENT HOSPITAL CARE,LEVL III: ICD-10-PCS | Mod: NTX,,, | Performed by: INTERNAL MEDICINE

## 2019-10-29 PROCEDURE — 83735 ASSAY OF MAGNESIUM: CPT | Mod: NTX

## 2019-10-29 PROCEDURE — 63600175 PHARM REV CODE 636 W HCPCS: Mod: NTX | Performed by: INTERNAL MEDICINE

## 2019-10-29 PROCEDURE — 80048 BASIC METABOLIC PNL TOTAL CA: CPT | Mod: NTX

## 2019-10-29 PROCEDURE — 85610 PROTHROMBIN TIME: CPT | Mod: NTX

## 2019-10-29 RX ORDER — BISACODYL 10 MG
10 SUPPOSITORY, RECTAL RECTAL DAILY PRN
Status: DISCONTINUED | OUTPATIENT
Start: 2019-10-29 | End: 2019-10-30 | Stop reason: HOSPADM

## 2019-10-29 RX ORDER — LISINOPRIL 10 MG/1
10 TABLET ORAL 2 TIMES DAILY
Status: DISCONTINUED | OUTPATIENT
Start: 2019-10-29 | End: 2019-10-30 | Stop reason: HOSPADM

## 2019-10-29 RX ADMIN — VENLAFAXINE 150 MG: 37.5 TABLET ORAL at 09:10

## 2019-10-29 RX ADMIN — GABAPENTIN 400 MG: 100 CAPSULE ORAL at 09:10

## 2019-10-29 RX ADMIN — AMIODARONE HYDROCHLORIDE 400 MG: 200 TABLET ORAL at 09:10

## 2019-10-29 RX ADMIN — HUMAN ALBUMIN MICROSPHERES AND PERFLUTREN 0.66 MG: 10; .22 INJECTION, SOLUTION INTRAVENOUS at 03:10

## 2019-10-29 RX ADMIN — ASPIRIN 325 MG: 325 TABLET, DELAYED RELEASE ORAL at 09:10

## 2019-10-29 RX ADMIN — MEXILETINE HYDROCHLORIDE 150 MG: 150 CAPSULE ORAL at 09:10

## 2019-10-29 RX ADMIN — LISINOPRIL 5 MG: 5 TABLET ORAL at 09:10

## 2019-10-29 RX ADMIN — ONDANSETRON 4 MG: 2 INJECTION INTRAMUSCULAR; INTRAVENOUS at 02:10

## 2019-10-29 RX ADMIN — LISINOPRIL 10 MG: 10 TABLET ORAL at 05:10

## 2019-10-29 RX ADMIN — PANTOPRAZOLE SODIUM 40 MG: 40 TABLET, DELAYED RELEASE ORAL at 09:10

## 2019-10-29 RX ADMIN — ONDANSETRON 4 MG: 2 INJECTION INTRAMUSCULAR; INTRAVENOUS at 08:10

## 2019-10-29 RX ADMIN — MEXILETINE HYDROCHLORIDE 150 MG: 150 CAPSULE ORAL at 02:10

## 2019-10-29 RX ADMIN — MIRTAZAPINE 15 MG: 15 TABLET, FILM COATED ORAL at 09:10

## 2019-10-29 RX ADMIN — Medication 400 MG: at 09:10

## 2019-10-29 RX ADMIN — MEXILETINE HYDROCHLORIDE 150 MG: 150 CAPSULE ORAL at 06:10

## 2019-10-29 NOTE — PLAN OF CARE
[facility]   Heart Transplant/VAD Clinic   [address] [place] [address]   [phone] after hours          [phone] fax     VAD HOME  HEALTH ORDERS      Admit to Home Health    Diagnosis:   Patient Active Problem List   Diagnosis    Knee pain    Pes anserine bursitis    ICD (implantable cardioverter-defibrillator) in place    Ventricular fibrillation    Congestive cardiomyopathy    History of ventricular fibrillation    History of ventricular tachycardia    Heart transplant candidate    Enlarged thyroid    Abnormal CT scan    CKD (chronic kidney disease)    Chronic systolic congestive heart failure    LVAD (left ventricular assist device) present    Pleural effusion    Anticoagulation monitoring, INR range 2-3    Long term (current) use of anticoagulants    Ventricular tachycardia    Essential hypertension       Patient is homebound due to:  NYHA Class IV HF. S/P LVAD placement.     Diet: Low Fat, Low cholesterol, 2Gm Na, Coumadin restrictions.    Acitivities: No Swimming, bathing, vacuuming, contact sports.    Fresh implants= Sternal Precautions    Nursing:   SN to complete comprehensive assessment including routine vital signs. Instruct on disease process and s/s of complications to report to MD. Review/verify medication list sent home with the patient at time of discharge  and instruct patient/caregiver as needed. Frequency may be adjusted depending on start of care date.    **LVAD driveline exit site dressing change is to be completed per LVAD patient/caregiver only**.    Notify MD if:  SBP > 120 or < 80;   MAP > 80 or < 65;   HR > 120 or < 60;   Temp > 101;   Weight gain >3lbs in 1 day or 5lbs in 1 week.    LABS:  SN to perform labs: PT/INR per Coumadin clinic [phone].   Follow up INR date: 11/01/2019  No Finger Sticks    CONSULTS:      Physical Therapy to evaluate and treat. Evaluate for home safety and equipment needs;  Establish/upgrade home exercise program. Perform / instruct on therapeutic exercises, gait training, transfer training, and Range of Motion.    Occupational Therapy to evaluate and treat. Evaluate home environment for safety and equipment needs. Perform/Instruct on transfers, ADL training, ROM, and therapeutic exercises.    Aide to provide assistance with personal care, ADLs, and vital signs    Send initial Home Health orders to HTS attending physician on call.  Send follow up questions to VAD clinic MD [phone] or [phone].

## 2019-10-29 NOTE — ASSESSMENT & PLAN NOTE
- Patient recently discharged after an episode of VT required ICD shock.   - Now readmitted after two ICD shocks. Per admission -ICD interrogation showed an episode of VT on 10/23 at 8:57 pm and one episode of VT->VF on 10/24 at 12:38 pm.   - Electrolytes WNL at time of event. Bedside echo with LV unchanged in size (no collapse).   - Loaded with IV amio, 10/24 -10/26. First dose of amiodarone 400 bid on 10/26 -> 2 weeks then on 11/09 change to 400 mg daily . Mexitil started at 150 mg TID.  - Appreciate EP help  - Will get RHC in effort to pursue listing patient for transplant in case VT continues to be an issue.

## 2019-10-29 NOTE — PROGRESS NOTES
10/29/2019  Trevin Sow    Current provider:  Eric Antunez Jr.,*      I, Trevin Sow, rounded on Deborah Navas to ensure all mechanical assist device settings (IABP or VAD) were appropriate and all parameters were within limits.  I was able to ensure all back up equipment was present, the staff had no issues, and the Perfusion Department daily rounding was complete.    7:05 AM

## 2019-10-29 NOTE — NURSING
Per Dr Bryant, pt has had no more VT or low flow alarms.  Will plan to wait until pt is closer to 3mths post-vad to update evaluation tests.

## 2019-10-29 NOTE — PLAN OF CARE
Plan of care discussed with patient. Patient is free of fall/trauma/injury. Denies CP, SOB, or pain/discomfort. Monitor showing conduction defect. LVAD numbers WNL. MAP-75. Mother at bedside for the night. Uneventful night shift. All questions addressed. Will continue to monitor

## 2019-10-29 NOTE — PROGRESS NOTES
BENIGNO faxed clinical and HH orders to pt's previous HH agency, [facility] (#[phone] fax#[phone]) and will follow up tomorrow on pt's potential day of discharge. BENIGNO remains available.

## 2019-10-29 NOTE — ASSESSMENT & PLAN NOTE
-HeartMate 3 Implanted 9/10/19 as DT.  -Implanted by Dr. Walden  -INR 3.4 from 2.6. At home on 1.5 mg MWF and 3 mg TTSS Goal INR 2.0-3.0. Will hold today  -Antiplatelets:  mg.  -LDH stable  -Speed set at 5200.  -Not listed for OHTx: was declined due to pulmonary hypertension. Will get RHC in effort to move forward with listing.        Procedure: Device Interrogation Including analysis of device parameters  Current Settings: Ventricular Assist Device  Review of device function is stable    TXP LVAD INTERROGATIONS 10/29/2019 10/29/2019 10/29/2019 10/29/2019 10/28/2019 10/28/2019 10/28/2019   Type HeartMate3 HeartMate3 HeartMate3 HeartMate3 HeartMate3 HeartMate3 HeartMate3   Flow 3.7 4.5 3.7 3.6 3.7 3.8 3.6   Speed 5300 5300 5300 5300 5300 5200 5200   PI 4.4 2.3 4.0 4.9 4.7 4.1 4.3   Power (Orellana) 3.6 3.8 3.8 3.6 3.6 3.6 3.5   LSL 4900 4900 4800 4800 4800 4800 -   Pulsatility - - - - - - -

## 2019-10-29 NOTE — PROGRESS NOTES
[facility]  Heart Transplant  Progress Note    Patient Name: Deborah Navas  MRN: [id number]  Admission Date: 10/24/2019  Hospital Length of Stay: 5 days  Attending Physician: Eric Antunez Jr.,*  Primary Care Provider: Primary Doctor No  Principal Problem:Ventricular tachycardia    Subjective:     Interval History: No further episodes of LFA and BP has been at goal. Continue Amiodarone 400 mg BID and Mexiletine 150 mg TID.   -Scr 1.5 and seems to do better without added diuretics so will hold Aldactone.   -INR remains elevated and will hold RHC for outpatient basis.   -Echo today for follow up after increasing speed 10/28.    Continuous Infusions:  Scheduled Meds:   amiodarone  400 mg Oral BID    aspirin  325 mg Oral Daily    gabapentin  400 mg Oral BID    lisinopril  5 mg Oral BID    magnesium oxide  400 mg Oral Daily    mexiletine  150 mg Oral Q8H    mirtazapine  15 mg Oral QHS    pantoprazole  40 mg Oral Daily    venlafaxine  150 mg Oral Daily     PRN Meds:ALPRAZolam, bisacodyl, magnesium hydroxide 400 mg/5 ml, ondansetron, oxyCODONE-acetaminophen, senna-docusate 8.6-50 mg    Review of patient's allergies indicates:   Allergen Reactions    Adhesive Blisters     Reaction to area in chest and up only    Codeine Itching     Objective:     Vital Signs (Most Recent):  Temp: 97.5 °F (36.4 °C) (10/29/19 1239)  Pulse: 100 (10/29/19 1239)  Resp: 18 (10/29/19 1239)  BP: (!) 80/0 (10/29/19 1239)  SpO2: 98 % (10/29/19 1239) Vital Signs (24h Range):  Temp:  [97.5 °F (36.4 °C)-98.7 °F (37.1 °C)] 97.5 °F (36.4 °C)  Pulse:  [] 100  Resp:  [16-18] 18  SpO2:  [96 %-99 %] 98 %  BP: ()/(0-64) 80/0     Patient Vitals for the past 72 hrs (Last 3 readings):   Weight   10/29/19 0516 102.5 kg (225 lb 15.5 oz)   10/28/19 0527 104.3 kg (230 lb 0.8 oz)   10/27/19 0403 103.7 kg (228 lb 9.9 oz)     Body mass index is 35.39 kg/m².      Intake/Output Summary (Last 24 hours) at 10/29/2019  1359  Last data filed at 10/29/2019 1300  Gross per 24 hour   Intake 3003 ml   Output 1000 ml   Net 2003 ml       Hemodynamic Parameters:       Telemetry: No events    Physical Exam   Constitutional: She is oriented to person, place, and time. She appears well-developed and well-nourished.   HENT:   Mouth/Throat: Oropharynx is clear and moist.   Eyes: EOM are normal.   Neck: No JVD present.   Cardiovascular: Normal rate, regular rhythm and intact distal pulses.   Smooth VAD hum   Pulmonary/Chest: Effort normal and breath sounds normal. No respiratory distress. She has no rales.   Abdominal: Soft. Bowel sounds are normal. She exhibits no distension. There is no tenderness. There is no guarding.   Musculoskeletal: She exhibits no edema.   Neurological: She is alert and oriented to person, place, and time.   Skin: Skin is warm.   Psychiatric: She has a normal mood and affect.   Nursing note and vitals reviewed.      Significant Labs:  CBC:  Recent Labs   Lab 10/27/19  0337 10/28/19  0659 10/29/19  0346   WBC 5.05 4.88 6.69   RBC 3.96* 4.15 4.16   HGB 10.1* 10.7* 10.7*   HCT 35.6* 36.6* 36.3*    350 373*   MCV 90 88 87   MCH 25.5* 25.8* 25.7*   MCHC 28.4* 29.2* 29.5*     BNP:  Recent Labs   Lab 10/25/19  0402 10/28/19  0659   * 376*     CMP:  Recent Labs   Lab 10/25/19  0403  10/27/19  1627 10/28/19  0659 10/29/19  0345   *   < > 117* 116* 115*   CALCIUM 9.3   < > 9.4 9.4 9.2   ALBUMIN 3.1*  --   --  3.4*  --    PROT 6.7  --   --  7.0  --       < > 140 144 140   K 3.6   < > 4.0 4.5 4.6   CO2 24   < > 24 27 24      < > 106 108 106   BUN 21*   < > 22* 19 23*   CREATININE 1.4   < > 1.6* 1.3 1.5*   ALKPHOS 83  --   --  88  --    ALT 13  --   --  13  --    AST 16  --   --  14  --    BILITOT 0.4  --   --  0.6  --     < > = values in this interval not displayed.      Coagulation:   Recent Labs   Lab 10/27/19  0337 10/28/19  0659 10/29/19  0346   INR 2.5* 2.6* 3.4*   APTT 34.2* 32.9* 33.7*      LDH:  Recent Labs   Lab 10/27/19  0337 10/28/19  0659 10/29/19  0345    224 276*     Microbiology:  Microbiology Results (last 7 days)     ** No results found for the last 168 hours. **          BMP:   Recent Labs   Lab 10/29/19  0345   *      K 4.6      CO2 24   BUN 23*   CREATININE 1.5*   CALCIUM 9.2   MG 2.5     Cardiac Markers: No results for input(s): CKMB, TROPONINT, MYOGLOBIN in the last 72 hours.  Coagulation:   Recent Labs   Lab 10/29/19  0346   INR 3.4*   APTT 33.7*     I have reviewed all pertinent labs within the past 24 hours.    Estimated Creatinine Clearance: 55.3 mL/min (A) (based on SCr of 1.5 mg/dL (H)).    Diagnostic Results:  I have reviewed all pertinent imaging results/findings within the past 24 hours.    Assessment and Plan:     50 yo WF with NICM, s/p HM3 implantation 9/10/19 (post up coarse uncomplicated with the exception of+ diaphragmatic stimulation of LV lead and pleural effusion with chest tube placement, atrial flutter with NADINE/DCCV), V-fib reported in setting of hypokalemia with appropriate shock from ICD on Amiodarone prior to LVAD placement. Transferred from outside hospital for ICD shock x2 at home.     Recently admitted from 10/20-10/22 also for ICD shock. EP was consulted who deemed her ICD shock appropriate for MMVT. Patient was IV loaded with amiodarone and transitioned to PO prior to discharge. Lasix was decreased from QD to EOD due to concerns for hypovolemia. Her driveline was cultured due to pain and minimal drainage, no growth on cultures.     On 10/23, she was sitting down watching TV when she suddenly felt a jumping sensation in her left lower chest followed by a shock. Denies LOC, dizziness or palpitations prior to this episode. After the shock she felt slightly nauseous. She called the clinic and was advised to go to ER if she had a second shock. Today she had a similar episode around 12 pm and went to her local ED. Otherwise doing well  since discharged on Tuesday.     * Ventricular tachycardia  - Patient recently discharged after an episode of VT required ICD shock.   - Now readmitted after two ICD shocks. Per admission -ICD interrogation showed an episode of VT on 10/23 at 8:57 pm and one episode of VT->VF on 10/24 at 12:38 pm.   - Electrolytes WNL at time of event. Bedside echo with LV unchanged in size (no collapse).   - Loaded with IV amio, 10/24 -10/26. First dose of amiodarone 400 bid on 10/26 -> 2 weeks then on 11/09 change to 400 mg daily . Mexitil started at 150 mg TID.  - Appreciate EP help  - Will get RHC in effort to pursue listing patient for transplant in case VT continues to be an issue.     Essential hypertension  - On lisinopril and spironolactone. Monitor cr  - Goal - doppler 60-90 mmHg    LVAD (left ventricular assist device) present  -HeartMate 3 Implanted 9/10/19 as DT.  -Implanted by Dr. Walden  -INR 3.4 from 2.6. At home on 1.5 mg MWF and 3 mg TTSS Goal INR 2.0-3.0. Will hold today  -Antiplatelets:  mg.  -LDH stable  -Speed set at 5200.  -Not listed for OHTx: was declined due to pulmonary hypertension. Will get RHC in effort to move forward with listing.        Procedure: Device Interrogation Including analysis of device parameters  Current Settings: Ventricular Assist Device  Review of device function is stable    TXP LVAD INTERROGATIONS 10/29/2019 10/29/2019 10/29/2019 10/29/2019 10/28/2019 10/28/2019 10/28/2019   Type HeartMate3 HeartMate3 HeartMate3 HeartMate3 HeartMate3 HeartMate3 HeartMate3   Flow 3.7 4.5 3.7 3.6 3.7 3.8 3.6   Speed 5300 5300 5300 5300 5300 5200 5200   PI 4.4 2.3 4.0 4.9 4.7 4.1 4.3   Power (Orellana) 3.6 3.8 3.8 3.6 3.6 3.6 3.5   LSL 4900 4900 4800 4800 4800 4800 -   Pulsatility - - - - - - -       Chronic systolic congestive heart failure  -Euvolemic on exam. Takes furosemide every other day. Held on admit  -Continue Lisinopril and hold Spironolactone     Ventricular fibrillation  - see  VT        Dyllan Herzog MD  Heart Transplant  [facility]

## 2019-10-29 NOTE — SUBJECTIVE & OBJECTIVE
Interval History: No further episodes of LFA and BP has been at goal. Continue Amiodarone 400 mg BID and Mexiletine 150 mg TID.   -Scr 1.5 and seems to do better without added diuretics so will hold Aldactone.   -INR remains elevated and will hold RHC for outpatient basis.   -Echo today for follow up after increasing speed 10/28.    Continuous Infusions:  Scheduled Meds:   amiodarone  400 mg Oral BID    aspirin  325 mg Oral Daily    gabapentin  400 mg Oral BID    lisinopril  5 mg Oral BID    magnesium oxide  400 mg Oral Daily    mexiletine  150 mg Oral Q8H    mirtazapine  15 mg Oral QHS    pantoprazole  40 mg Oral Daily    venlafaxine  150 mg Oral Daily     PRN Meds:ALPRAZolam, bisacodyl, magnesium hydroxide 400 mg/5 ml, ondansetron, oxyCODONE-acetaminophen, senna-docusate 8.6-50 mg    Review of patient's allergies indicates:   Allergen Reactions    Adhesive Blisters     Reaction to area in chest and up only    Codeine Itching     Objective:     Vital Signs (Most Recent):  Temp: 97.5 °F (36.4 °C) (10/29/19 1239)  Pulse: 100 (10/29/19 1239)  Resp: 18 (10/29/19 1239)  BP: (!) 80/0 (10/29/19 1239)  SpO2: 98 % (10/29/19 1239) Vital Signs (24h Range):  Temp:  [97.5 °F (36.4 °C)-98.7 °F (37.1 °C)] 97.5 °F (36.4 °C)  Pulse:  [] 100  Resp:  [16-18] 18  SpO2:  [96 %-99 %] 98 %  BP: ()/(0-64) 80/0     Patient Vitals for the past 72 hrs (Last 3 readings):   Weight   10/29/19 0516 102.5 kg (225 lb 15.5 oz)   10/28/19 0527 104.3 kg (230 lb 0.8 oz)   10/27/19 0403 103.7 kg (228 lb 9.9 oz)     Body mass index is 35.39 kg/m².      Intake/Output Summary (Last 24 hours) at 10/29/2019 1359  Last data filed at 10/29/2019 1300  Gross per 24 hour   Intake 3003 ml   Output 1000 ml   Net 2003 ml       Hemodynamic Parameters:       Telemetry: No events    Physical Exam   Constitutional: She is oriented to person, place, and time. She appears well-developed and well-nourished.   HENT:   Mouth/Throat: Oropharynx is  clear and moist.   Eyes: EOM are normal.   Neck: No JVD present.   Cardiovascular: Normal rate, regular rhythm and intact distal pulses.   Smooth VAD hum   Pulmonary/Chest: Effort normal and breath sounds normal. No respiratory distress. She has no rales.   Abdominal: Soft. Bowel sounds are normal. She exhibits no distension. There is no tenderness. There is no guarding.   Musculoskeletal: She exhibits no edema.   Neurological: She is alert and oriented to person, place, and time.   Skin: Skin is warm.   Psychiatric: She has a normal mood and affect.   Nursing note and vitals reviewed.      Significant Labs:  CBC:  Recent Labs   Lab 10/27/19  0337 10/28/19  0659 10/29/19  0346   WBC 5.05 4.88 6.69   RBC 3.96* 4.15 4.16   HGB 10.1* 10.7* 10.7*   HCT 35.6* 36.6* 36.3*    350 373*   MCV 90 88 87   MCH 25.5* 25.8* 25.7*   MCHC 28.4* 29.2* 29.5*     BNP:  Recent Labs   Lab 10/25/19  0402 10/28/19  0659   * 376*     CMP:  Recent Labs   Lab 10/25/19  0403  10/27/19  1627 10/28/19  0659 10/29/19  0345   *   < > 117* 116* 115*   CALCIUM 9.3   < > 9.4 9.4 9.2   ALBUMIN 3.1*  --   --  3.4*  --    PROT 6.7  --   --  7.0  --       < > 140 144 140   K 3.6   < > 4.0 4.5 4.6   CO2 24   < > 24 27 24      < > 106 108 106   BUN 21*   < > 22* 19 23*   CREATININE 1.4   < > 1.6* 1.3 1.5*   ALKPHOS 83  --   --  88  --    ALT 13  --   --  13  --    AST 16  --   --  14  --    BILITOT 0.4  --   --  0.6  --     < > = values in this interval not displayed.      Coagulation:   Recent Labs   Lab 10/27/19  0337 10/28/19  0659 10/29/19  0346   INR 2.5* 2.6* 3.4*   APTT 34.2* 32.9* 33.7*     LDH:  Recent Labs   Lab 10/27/19  0337 10/28/19  0659 10/29/19  0345    224 276*     Microbiology:  Microbiology Results (last 7 days)     ** No results found for the last 168 hours. **          BMP:   Recent Labs   Lab 10/29/19  0345   *      K 4.6      CO2 24   BUN 23*   CREATININE 1.5*   CALCIUM 9.2    MG 2.5     Cardiac Markers: No results for input(s): CKMB, TROPONINT, MYOGLOBIN in the last 72 hours.  Coagulation:   Recent Labs   Lab 10/29/19  0346   INR 3.4*   APTT 33.7*     I have reviewed all pertinent labs within the past 24 hours.    Estimated Creatinine Clearance: 55.3 mL/min (A) (based on SCr of 1.5 mg/dL (H)).    Diagnostic Results:  I have reviewed all pertinent imaging results/findings within the past 24 hours.

## 2019-10-29 NOTE — ASSESSMENT & PLAN NOTE
-Euvolemic on exam. Takes furosemide every other day. Held on admit  -Continue Lisinopril and hold Spironolactone

## 2019-10-30 VITALS
BODY MASS INDEX: 36.64 KG/M2 | RESPIRATION RATE: 18 BRPM | HEART RATE: 96 BPM | HEIGHT: 67 IN | OXYGEN SATURATION: 99 % | TEMPERATURE: 98 F | WEIGHT: 233.44 LBS | SYSTOLIC BLOOD PRESSURE: 80 MMHG

## 2019-10-30 LAB
ALBUMIN SERPL BCP-MCNC: 3.4 G/DL (ref 3.5–5.2)
ALP SERPL-CCNC: 91 U/L (ref 55–135)
ALT SERPL W/O P-5'-P-CCNC: 15 U/L (ref 10–44)
ANION GAP SERPL CALC-SCNC: 10 MMOL/L (ref 8–16)
APTT BLDCRRT: 34.2 SEC (ref 21–32)
AST SERPL-CCNC: 16 U/L (ref 10–40)
BASOPHILS # BLD AUTO: 0.04 K/UL (ref 0–0.2)
BASOPHILS NFR BLD: 1 % (ref 0–1.9)
BILIRUB DIRECT SERPL-MCNC: 0.3 MG/DL (ref 0.1–0.3)
BILIRUB SERPL-MCNC: 0.4 MG/DL (ref 0.1–1)
BNP SERPL-MCNC: 260 PG/ML (ref 0–99)
BUN SERPL-MCNC: 24 MG/DL (ref 6–20)
CALCIUM SERPL-MCNC: 8.9 MG/DL (ref 8.7–10.5)
CHLORIDE SERPL-SCNC: 107 MMOL/L (ref 95–110)
CO2 SERPL-SCNC: 24 MMOL/L (ref 23–29)
CREAT SERPL-MCNC: 1.5 MG/DL (ref 0.5–1.4)
CRP SERPL-MCNC: 15.7 MG/L (ref 0–8.2)
DIFFERENTIAL METHOD: ABNORMAL
EOSINOPHIL # BLD AUTO: 0.2 K/UL (ref 0–0.5)
EOSINOPHIL NFR BLD: 5.2 % (ref 0–8)
ERYTHROCYTE [DISTWIDTH] IN BLOOD BY AUTOMATED COUNT: 16.9 % (ref 11.5–14.5)
EST. GFR  (AFRICAN AMERICAN): 46.5 ML/MIN/1.73 M^2
EST. GFR  (NON AFRICAN AMERICAN): 40.3 ML/MIN/1.73 M^2
GLUCOSE SERPL-MCNC: 93 MG/DL (ref 70–110)
HCT VFR BLD AUTO: 35.5 % (ref 37–48.5)
HGB BLD-MCNC: 9.9 G/DL (ref 12–16)
IMM GRANULOCYTES # BLD AUTO: 0.02 K/UL (ref 0–0.04)
IMM GRANULOCYTES NFR BLD AUTO: 0.5 % (ref 0–0.5)
INR PPP: 2.6 (ref 0.8–1.2)
LDH SERPL L TO P-CCNC: 244 U/L (ref 110–260)
LYMPHOCYTES # BLD AUTO: 0.8 K/UL (ref 1–4.8)
LYMPHOCYTES NFR BLD: 19 % (ref 18–48)
MAGNESIUM SERPL-MCNC: 2.5 MG/DL (ref 1.6–2.6)
MCH RBC QN AUTO: 25.1 PG (ref 27–31)
MCHC RBC AUTO-ENTMCNC: 27.9 G/DL (ref 32–36)
MCV RBC AUTO: 90 FL (ref 82–98)
MONOCYTES # BLD AUTO: 0.2 K/UL (ref 0.3–1)
MONOCYTES NFR BLD: 4.7 % (ref 4–15)
NEUTROPHILS # BLD AUTO: 2.8 K/UL (ref 1.8–7.7)
NEUTROPHILS NFR BLD: 69.6 % (ref 38–73)
NRBC BLD-RTO: 0 /100 WBC
PHOSPHATE SERPL-MCNC: 3.3 MG/DL (ref 2.7–4.5)
PLATELET # BLD AUTO: 349 K/UL (ref 150–350)
PMV BLD AUTO: 9.8 FL (ref 9.2–12.9)
POTASSIUM SERPL-SCNC: 4.1 MMOL/L (ref 3.5–5.1)
PREALB SERPL-MCNC: 24 MG/DL (ref 20–43)
PROT SERPL-MCNC: 7 G/DL (ref 6–8.4)
PROTHROMBIN TIME: 24.9 SEC (ref 9–12.5)
RBC # BLD AUTO: 3.94 M/UL (ref 4–5.4)
SODIUM SERPL-SCNC: 141 MMOL/L (ref 136–145)
WBC # BLD AUTO: 4.05 K/UL (ref 3.9–12.7)

## 2019-10-30 PROCEDURE — 25000003 PHARM REV CODE 250: Mod: NTX | Performed by: INTERNAL MEDICINE

## 2019-10-30 PROCEDURE — 99238 HOSP IP/OBS DSCHRG MGMT 30/<: CPT | Mod: NTX,,, | Performed by: INTERNAL MEDICINE

## 2019-10-30 PROCEDURE — 83880 ASSAY OF NATRIURETIC PEPTIDE: CPT | Mod: NTX

## 2019-10-30 PROCEDURE — 80076 HEPATIC FUNCTION PANEL: CPT | Mod: NTX

## 2019-10-30 PROCEDURE — 85025 COMPLETE CBC W/AUTO DIFF WBC: CPT | Mod: NTX

## 2019-10-30 PROCEDURE — 84100 ASSAY OF PHOSPHORUS: CPT | Mod: NTX

## 2019-10-30 PROCEDURE — 84134 ASSAY OF PREALBUMIN: CPT | Mod: NTX

## 2019-10-30 PROCEDURE — 80048 BASIC METABOLIC PNL TOTAL CA: CPT | Mod: NTX

## 2019-10-30 PROCEDURE — 36415 COLL VENOUS BLD VENIPUNCTURE: CPT | Mod: NTX

## 2019-10-30 PROCEDURE — 25000003 PHARM REV CODE 250: Mod: NTX | Performed by: PHYSICIAN ASSISTANT

## 2019-10-30 PROCEDURE — 85610 PROTHROMBIN TIME: CPT | Mod: NTX

## 2019-10-30 PROCEDURE — 86140 C-REACTIVE PROTEIN: CPT | Mod: NTX

## 2019-10-30 PROCEDURE — 99238 PR HOSPITAL DISCHARGE DAY,<30 MIN: ICD-10-PCS | Mod: NTX,,, | Performed by: INTERNAL MEDICINE

## 2019-10-30 PROCEDURE — 83735 ASSAY OF MAGNESIUM: CPT | Mod: NTX

## 2019-10-30 PROCEDURE — 93750 PR INTERROGATE VENT ASSIST DEV, IN PERSON, W PHYSICIAN ANALYSIS: ICD-10-PCS | Mod: NTX,,, | Performed by: INTERNAL MEDICINE

## 2019-10-30 PROCEDURE — 27000248 HC VAD-ADDITIONAL DAY: Mod: NTX

## 2019-10-30 PROCEDURE — 85730 THROMBOPLASTIN TIME PARTIAL: CPT | Mod: NTX

## 2019-10-30 PROCEDURE — 93750 INTERROGATION VAD IN PERSON: CPT | Mod: NTX,,, | Performed by: INTERNAL MEDICINE

## 2019-10-30 PROCEDURE — 83615 LACTATE (LD) (LDH) ENZYME: CPT | Mod: NTX

## 2019-10-30 RX ORDER — ALPRAZOLAM 0.25 MG/1
0.25 TABLET ORAL 2 TIMES DAILY PRN
Qty: 60 TABLET | Refills: 3 | Status: ON HOLD | OUTPATIENT
Start: 2019-10-30 | End: 2019-12-30 | Stop reason: HOSPADM

## 2019-10-30 RX ORDER — MEXILETINE HYDROCHLORIDE 150 MG/1
150 CAPSULE ORAL EVERY 8 HOURS
Qty: 90 CAPSULE | Refills: 11 | Status: ON HOLD | OUTPATIENT
Start: 2019-10-30 | End: 2019-12-30 | Stop reason: HOSPADM

## 2019-10-30 RX ORDER — FUROSEMIDE 20 MG/1
TABLET ORAL
Qty: 30 TABLET | Refills: 11 | Status: ON HOLD
Start: 2019-10-30 | End: 2019-12-30 | Stop reason: HOSPADM

## 2019-10-30 RX ORDER — LANOLIN ALCOHOL/MO/W.PET/CERES
400 CREAM (GRAM) TOPICAL DAILY
Qty: 30 TABLET | Refills: 11 | Status: ON HOLD | OUTPATIENT
Start: 2019-10-31 | End: 2019-12-30 | Stop reason: HOSPADM

## 2019-10-30 RX ORDER — LISINOPRIL 10 MG/1
10 TABLET ORAL 2 TIMES DAILY
Qty: 180 TABLET | Refills: 3 | Status: ON HOLD | OUTPATIENT
Start: 2019-10-30 | End: 2019-12-30 | Stop reason: HOSPADM

## 2019-10-30 RX ORDER — WARFARIN 3 MG/1
TABLET ORAL
Qty: 45 TABLET | Refills: 11 | Status: ON HOLD | OUTPATIENT
Start: 2019-10-30 | End: 2019-12-30 | Stop reason: HOSPADM

## 2019-10-30 RX ADMIN — LISINOPRIL 10 MG: 10 TABLET ORAL at 09:10

## 2019-10-30 RX ADMIN — ASPIRIN 325 MG: 325 TABLET, DELAYED RELEASE ORAL at 09:10

## 2019-10-30 RX ADMIN — VENLAFAXINE 150 MG: 37.5 TABLET ORAL at 09:10

## 2019-10-30 RX ADMIN — GABAPENTIN 400 MG: 100 CAPSULE ORAL at 09:10

## 2019-10-30 RX ADMIN — PANTOPRAZOLE SODIUM 40 MG: 40 TABLET, DELAYED RELEASE ORAL at 09:10

## 2019-10-30 RX ADMIN — Medication 400 MG: at 09:10

## 2019-10-30 RX ADMIN — AMIODARONE HYDROCHLORIDE 400 MG: 200 TABLET ORAL at 09:10

## 2019-10-30 RX ADMIN — MEXILETINE HYDROCHLORIDE 150 MG: 150 CAPSULE ORAL at 02:10

## 2019-10-30 RX ADMIN — MEXILETINE HYDROCHLORIDE 150 MG: 150 CAPSULE ORAL at 05:10

## 2019-10-30 NOTE — PROGRESS NOTES
DISCHARGE NOTE:    Deborah Navas is a 49 y.o. female s/p HM3 lvad from 9/10 admitted for evaluation of ICD shocks.     Past Medical History:   Diagnosis Date    Fractures     History of ventricular fibrillation 9/4/2019    History of ventricular tachycardia 9/4/2019    Hyperlipidemia     Migraine     Osteoarthritis        Hospital Course: During her hospital stay she was administered  IV amiodarone followed by oral load (EOT 11.9) and started on mexiletine.     Her warfarin dose was held x 1 for an increased inr of 2.6 to 3.4. She was instructed to decrease her previous dose to 3mg MWF / 1.5mg on other days.     Alprazolam was ordered fromthe [facility] for anxiety surrounding the ICD shocks.     Allergies:   Review of patient's allergies indicates:   Allergen Reactions    Adhesive Blisters     Reaction to area in chest and up only    Codeine Itching       Patient Pharmacy: [facility]     Pharmacy Interventions/Recommendations:  1) INR Goal: 2-3    2) Antiplatelet Agents: ASA 325mg    3) Heparin Bridging:  UFH    4) Patient Counseling/Education:  Medication card provided     5) INR Follow-Up/Discharge Needs:  Thursday with  and coumadin clinic.     See list of discharge medication for dosing instructions.     Deborah Navas and her caregiver verbalized their understanding and had the opportunity to ask questions.      Discharge Medications:   Deborah Navas   Home Medication Instructions MARLEY:[id number]    Printed on:10/30/19 7966   Medication Information                      ALPRAZolam (XANAX) 0.25 MG tablet  Take 1 tablet (0.25 mg total) by mouth 2 (two) times daily as needed for Anxiety.             amiodarone (PACERONE) 200 MG Tab  Take 2 tablets (400mg) by mouth twice daily for 14 days then take 1 tablet (400mg) by mouth daily             aspirin (ECOTRIN) 325 MG EC tablet  Take 1 tablet (325 mg total) by mouth once daily.             furosemide (LASIX) 20 MG  tablet  Use as directed for weight gain >3 lbs             gabapentin (NEURONTIN) 400 MG capsule  Take 1 capsule (400 mg total) by mouth 2 (two) times daily.             lisinopril 10 MG tablet  Take 1 tablet (10 mg total) by mouth 2 (two) times daily.             magnesium oxide (MAG-OX) 400 mg (241.3 mg magnesium) tablet  Take 1 tablet (400 mg total) by mouth once daily.             mexiletine (MEXITIL) 150 MG Cap  Take 1 capsule (150 mg total) by mouth every 8 (eight) hours.             mirtazapine (REMERON) 15 MG tablet  Take 1 tablet (15 mg total) by mouth every evening.             oxyCODONE-acetaminophen (PERCOCET) 5-325 mg per tablet  Take 1 tablet by mouth once daily.             pantoprazole (PROTONIX) 40 MG tablet  Take 1 tablet (40 mg total) by mouth once daily.             senna-docusate 8.6-50 mg (PERICOLACE) 8.6-50 mg per tablet  Take 2 tablets by mouth 2 (two) times daily as needed for Constipation.             VENLAFAXINE HCL (EFFEXOR ORAL)  Take 150 mg by mouth once daily.             warfarin (COUMADIN) 3 MG tablet  Take 3mg orally daily every Mon/Wed/Fri and 1.5mg orally on all other days

## 2019-10-30 NOTE — ASSESSMENT & PLAN NOTE
-Euvolemic on exam. Takes furosemide every other day. Held on admit  -Continue Lisinopril 10 mg BID  -Hold Spironolactone

## 2019-10-30 NOTE — PLAN OF CARE
Patient cooperative and pleasant with routine care and procedures.  Medicated for nausea with adequate results noted.  Patient instructed on fall precautions and verbalized understanding.  Denies weakness or dizziness.  Will continue to monitor.

## 2019-10-30 NOTE — ASSESSMENT & PLAN NOTE
- Patient recently discharged after an episode of VT required ICD shock.   - Now readmitted after two ICD shocks. Per admission -ICD interrogation showed an episode of VT on 10/23 at 8:57 pm and one episode of VT->VF on 10/24 at 12:38 pm.   - Electrolytes WNL at time of event. Bedside echo with LV unchanged in size (no collapse).   - Loaded with IV amio, 10/24 -10/26. First dose of amiodarone 400 bid on 10/26 -> 2 weeks then on 11/09 change to 400 mg daily . Mexitil 150 mg TID.  - Appreciate EP help who will follow as OP  - Will get RHC as OP in effort to list  patient for transplant (recurrence of VT)

## 2019-10-30 NOTE — PROGRESS NOTES
10/30/2019  Terra Porter    Current provider:  Eric Antunez Jr.,*      I, Terra Porter, rounded on Deborah Navas to ensure all mechanical assist device settings (IABP or VAD) were appropriate and all parameters were within limits.  I was able to ensure all back up equipment was present, the staff had no issues, and the Perfusion Department daily rounding was complete.    9:28 AM

## 2019-10-30 NOTE — HOSPITAL COURSE
Seen by EP and device interrogated: VT1 accelerated to VT2 with ATP, followed by successful shock. Second event involves VT3, not amenable to ATP and resulting in another ICD shock. Was treated with re-loading Amiodarone then Amiodarone 400 mg BID for 2 weeks. Added Mexilitine 150 mg TID to regimen.     Her VAD displayed a low flow alarm in the setting of MAP above goal (102 mmHg). Her HTN regimen optimized with Lisinopril 10 mg BID. Aldactone discontinued due to GLO. She will continue with Lasix as outpatient on a PRN basis due to concern of hypovolemia and worsening GLO. Her VAD speed was increased to 5300 (10/28) based on Echo findings from prior admission on 10/21/19 with LVDD 6.8, septum shifting to right on diastole.     A follow up Echo on 10/29:  · LVAD speed is 5300 RPMs. The interventricular septum midline. The aortic valve does not open.  · The estimated ejection fraction is 20%. Mildly reduced right ventricular systolic function.  · Mild right ventricular enlargement.  · Mild mitral regurgitation. Mild tricuspid regurgitation.  · The estimated PA systolic pressure is 27 mm Hg. Elevated central venous pressure (15 mm Hg).  · Trivial circumferential pericardial effusion. There is a left pleural effusion.    Plan is for outpatient follow up with EP and HF clinic and will complete a listing workup with RHC as OP when she has been 3 mo post VAD implant.

## 2019-10-30 NOTE — ASSESSMENT & PLAN NOTE
-HeartMate 3 Implanted 9/10/19 as DT.  -Implanted by Dr. Walden  -INR 2.6 from 2.6. At home on 1.5 mg MWF and 3 mg TTSS Goal INR 2.0-3.0. W  -Antiplatelets:  mg.  -LDH stable  -Speed set at 5200.  -Not listed for OHTx: was declined due to pulmonary hypertension. Will get RHC in effort to move forward with listing.        Procedure: Device Interrogation Including analysis of device parameters  Current Settings: Ventricular Assist Device  Review of device function is stable    TXP LVAD INTERROGATIONS 10/30/2019 10/30/2019 10/30/2019 10/29/2019 10/29/2019 10/29/2019 10/29/2019   Type HeartMate3 HeartMate3 HeartMate3 HeartMate3 HeartMate3 HeartMate3 HeartMate3   Flow 3.8 4.2 3.7 3.5 3.7 3.7 4.5   Speed 5300 5300 5300 5300 5300 5300 5300   PI 3.9 3.4 2.9 5.8 4.0 4.4 2.3   Power ([place]) 3.6 3.7 3.6 3.6 3.7 3.6 3.8   LSL 4900 - - 4900 4900 4900 4900   Pulsatility Intermittent pulse Intermittent pulse Intermittent pulse Pulse - - -

## 2019-10-30 NOTE — SUBJECTIVE & OBJECTIVE
Interval History:   No further VT events. Continue Amiodarone 400 mg BID until 11/9 then QD, Mexiletine 150 mg TID.     No LFA overnight and BP at goal with Lisinopril 10 mg BID. Scr stable at 1.5 and will continue to hold Aldactone. Follow up Echo 10/29 with improving LVDD to 5.2 from 6.8. Septum is midline. Ao does not open.     -Will plan to DC today and see her in HF clinic in 2 weeks. Will see EP as outpatient as well.   -Will complete workup for listing as OP.    Continuous Infusions:  Scheduled Meds:   amiodarone  400 mg Oral BID    aspirin  325 mg Oral Daily    gabapentin  400 mg Oral BID    lisinopril  10 mg Oral BID    magnesium oxide  400 mg Oral Daily    mexiletine  150 mg Oral Q8H    mirtazapine  15 mg Oral QHS    pantoprazole  40 mg Oral Daily    venlafaxine  150 mg Oral Daily     PRN Meds:ALPRAZolam, bisacodyl, magnesium hydroxide 400 mg/5 ml, ondansetron, oxyCODONE-acetaminophen, senna-docusate 8.6-50 mg    Review of patient's allergies indicates:   Allergen Reactions    Adhesive Blisters     Reaction to area in chest and up only    Codeine Itching     Objective:     Vital Signs (Most Recent):  Temp: 98.1 °F (36.7 °C) (10/30/19 0758)  Pulse: 77 (10/30/19 0758)  Resp: 18 (10/30/19 0758)  BP: (!) 86/0 (10/30/19 0758)  SpO2: 97 % (10/30/19 0758) Vital Signs (24h Range):  Temp:  [97.5 °F (36.4 °C)-98.8 °F (37.1 °C)] 98.1 °F (36.7 °C)  Pulse:  [] 77  Resp:  [18-20] 18  SpO2:  [93 %-99 %] 97 %  BP: ()/(0-81) 86/0     Patient Vitals for the past 72 hrs (Last 3 readings):   Weight   10/30/19 0700 105.9 kg (233 lb 7.5 oz)   10/30/19 0432 106.6 kg (235 lb 0.2 oz)   10/29/19 1537 102.5 kg (226 lb)     Body mass index is 36.57 kg/m².      Intake/Output Summary (Last 24 hours) at 10/30/2019 1021  Last data filed at 10/30/2019 0700  Gross per 24 hour   Intake 630 ml   Output 2100 ml   Net -1470 ml       Hemodynamic Parameters:       Telemetry: No events    Physical Exam   Constitutional: She  is oriented to person, place, and time. She appears well-developed and well-nourished.   HENT:   Mouth/Throat: Oropharynx is clear and moist.   Eyes: EOM are normal.   Neck: No JVD present.   Cardiovascular: Normal rate, regular rhythm and intact distal pulses.   Smooth VAD hum   Pulmonary/Chest: Effort normal and breath sounds normal. No respiratory distress. She has no rales.   Abdominal: Soft. Bowel sounds are normal. She exhibits no distension. There is no tenderness. There is no guarding.   Musculoskeletal: She exhibits no edema.   Neurological: She is alert and oriented to person, place, and time.   Skin: Skin is warm.   Psychiatric: She has a normal mood and affect.   Nursing note and vitals reviewed.      Significant Labs:  CBC:  Recent Labs   Lab 10/28/19  0659 10/29/19  0346 10/30/19  0429   WBC 4.88 6.69 4.05   RBC 4.15 4.16 3.94*   HGB 10.7* 10.7* 9.9*   HCT 36.6* 36.3* 35.5*    373* 349   MCV 88 87 90   MCH 25.8* 25.7* 25.1*   MCHC 29.2* 29.5* 27.9*     BNP:  Recent Labs   Lab 10/25/19  0402 10/28/19  0659 10/30/19  0429   * 376* 260*     CMP:  Recent Labs   Lab 10/25/19  0403  10/28/19  0659 10/29/19  0345 10/30/19  0429   *   < > 116* 115* 93   CALCIUM 9.3   < > 9.4 9.2 8.9   ALBUMIN 3.1*  --  3.4*  --  3.4*   PROT 6.7  --  7.0  --  7.0      < > 144 140 141   K 3.6   < > 4.5 4.6 4.1   CO2 24   < > 27 24 24      < > 108 106 107   BUN 21*   < > 19 23* 24*   CREATININE 1.4   < > 1.3 1.5* 1.5*   ALKPHOS 83  --  88  --  91   ALT 13  --  13  --  15   AST 16  --  14  --  16   BILITOT 0.4  --  0.6  --  0.4    < > = values in this interval not displayed.      Coagulation:   Recent Labs   Lab 10/28/19  0659 10/29/19  0346 10/30/19  0429   INR 2.6* 3.4* 2.6*   APTT 32.9* 33.7* 34.2*     LDH:  Recent Labs   Lab 10/28/19  0659 10/29/19  0345 10/30/19  0429    276* 244     Microbiology:  Microbiology Results (last 7 days)     ** No results found for the last 168 hours. **           BMP:   Recent Labs   Lab 10/30/19  0429   GLU 93      K 4.1      CO2 24   BUN 24*   CREATININE 1.5*   CALCIUM 8.9   MG 2.5     Cardiac Markers: No results for input(s): CKMB, TROPONINT, MYOGLOBIN in the last 72 hours.  Coagulation:   Recent Labs   Lab 10/30/19  0429   INR 2.6*   APTT 34.2*     I have reviewed all pertinent labs within the past 24 hours.    Estimated Creatinine Clearance: 56.2 mL/min (A) (based on SCr of 1.5 mg/dL (H)).    Diagnostic Results:  I have reviewed all pertinent imaging results/findings within the past 24 hours.

## 2019-10-30 NOTE — DISCHARGE SUMMARY
[facility]  Heart Transplant  Discharge Summary      Patient Name: Deborah Navas  MRN: [id number]  Admission Date: 10/24/2019  Hospital Length of Stay: 6 days  Discharge Date and Time: 10/30/2019 10:41 AM  Attending Physician: Eric Antunez Jr.,*   Discharging Provider: Dyllan Herzog MD  Primary Care Provider: Primary Doctor No     HPI: 50 yo WF with NICM, s/p HM3 implantation 9/10/19 (post up coarse uncomplicated with the exception of+ diaphragmatic stimulation of LV lead and pleural effusion with chest tube placement, atrial flutter with NADINE/DCCV), V-fib reported in setting of hypokalemia with appropriate shock from ICD on Amiodarone prior to LVAD placement. Transferred from outside hospital for ICD shock x2 at home.     Recently admitted (10/20-10/22) also for ICD shock. EP was consulted who deemed her ICD shock appropriate for MMVT. Patient was IV loaded with amiodarone and transitioned to PO prior to discharge. Lasix was decreased from QD to QOD due to concerns for hypovolemia. Her driveline was cultured due to pain and minimal drainage, no growth on cultures.     On 10/23, a day post discharge, she was watching TV when she felt a shock in her left lower chest. Denied LOC, dizziness or palpitations prior to this episode. After the shock she felt slightly nauseous. HF-Clinic advised to go to ER if she had a second shock, which she did on 10/24 around 12 pm and went to her local ED.       Hospital Course: Seen by EP and device interrogated: VT1 accelerated to VT2 with ATP, followed by successful shock. Second event involves VT3, not amenable to ATP and resulting in another ICD shock. Was treated with re-loading Amiodarone then Amiodarone 400 mg BID for 2 weeks. Added Mexilitine 150 mg TID to regimen.     Her VAD displayed a low flow alarm in the setting of MAP above goal (102 mmHg). Her HTN regimen optimized with Lisinopril 10 mg BID. Aldactone discontinued due to GLO. She will  continue with Lasix as outpatient on a PRN basis due to concern of hypovolemia and worsening GLO. Her VAD speed was increased to 5300 (10/28) based on Echo findings from prior admission on 10/21/19 with LVDD 6.8, septum shifting to right on diastole.     A follow up Echo on 10/29:  · LVAD speed is 5300 RPMs. The interventricular septum midline. The aortic valve does not open.  · The estimated ejection fraction is 20%. Mildly reduced right ventricular systolic function.  · Mild right ventricular enlargement.  · Mild mitral regurgitation. Mild tricuspid regurgitation.  · The estimated PA systolic pressure is 27 mm Hg. Elevated central venous pressure (15 mm Hg).  · Trivial circumferential pericardial effusion. There is a left pleural effusion.    Plan is for outpatient follow up with EP and HF clinic and will complete a listing workup with RHC as OP when she has been 3 mo post VAD implant.     Consults (From admission, onward)        Status Ordering Provider     Inpatient consult to Electrophysiology  Once     Provider:  (Not yet assigned)    Completed VERONICA AVILA          Significant Diagnostic Studies: Labs:   BMP:   Recent Labs   Lab 10/29/19  0345 10/30/19  0429   * 93    141   K 4.6 4.1    107   CO2 24 24   BUN 23* 24*   CREATININE 1.5* 1.5*   CALCIUM 9.2 8.9   MG 2.5 2.5       Pending Diagnostic Studies:     None        Final Active Diagnoses:    Diagnosis Date Noted POA    PRINCIPAL PROBLEM:  Ventricular tachycardia [I47.2] 10/20/2019 Yes    Essential hypertension [I10] 10/21/2019 Yes    LVAD (left ventricular assist device) present [Z95.811] 09/10/2019 Not Applicable    Chronic systolic congestive heart failure [I50.22]  Yes    Ventricular fibrillation [I49.01] 10/28/2017 Yes      Problems Resolved During this Admission:      Discharged Condition: good    Disposition: Home or Self Care    Follow Up:    Patient Instructions:      COMPREHENSIVE METABOLIC PANEL   Standing Status: Future  Standing Exp. Date: 12/28/20     Notify your health care provider if you experience any of the following:  temperature >100.4     Notify your health care provider if you experience any of the following:  persistent nausea and vomiting or diarrhea     Notify your health care provider if you experience any of the following:  severe uncontrolled pain     Notify your health care provider if you experience any of the following:  redness, tenderness, or signs of infection (pain, swelling, redness, odor or green/yellow discharge around incision site)     Notify your health care provider if you experience any of the following:  difficulty breathing or increased cough     Notify your health care provider if you experience any of the following:  severe persistent headache     Notify your health care provider if you experience any of the following:  worsening rash     Notify your health care provider if you experience any of the following:  persistent dizziness, light-headedness, or visual disturbances     Notify your health care provider if you experience any of the following:  increased confusion or weakness     Notify your health care provider if you experience any of the following:     Medications:  Reconciled Home Medications:      Medication List      START taking these medications    ALPRAZolam 0.25 MG tablet  Commonly known as:  XANAX  Take 1 tablet (0.25 mg total) by mouth 2 (two) times daily as needed for Anxiety.     magnesium oxide 400 mg (241.3 mg magnesium) tablet  Commonly known as:  MAG-OX  Take 1 tablet (400 mg total) by mouth once daily.     mexiletine 150 MG Cap  Commonly known as:  MEXITIL  Take 1 capsule (150 mg total) by mouth every 8 (eight) hours.        CHANGE how you take these medications    furosemide 20 MG tablet  Commonly known as:  LASIX  Use as directed for weight gain >3 lbs  What changed:  additional instructions     lisinopril 10 MG tablet  Take 1 tablet (10 mg total) by mouth 2 (two) times  daily.  What changed:    · medication strength  · how much to take     warfarin 3 MG tablet  Commonly known as:  COUMADIN  Take 3mg orally daily every Mon/Wed/Fri and 1.5mg orally on all other days  What changed:  additional instructions        CONTINUE taking these medications    amiodarone 200 MG Tab  Commonly known as:  PACERONE  Take 2 tablets (400mg) by mouth twice daily for 14 days then take 1 tablet (400mg) by mouth daily     aspirin 325 MG EC tablet  Commonly known as:  ECOTRIN  Take 1 tablet (325 mg total) by mouth once daily.     EFFEXOR ORAL  Take 150 mg by mouth once daily.     gabapentin 400 MG capsule  Commonly known as:  NEURONTIN  Take 1 capsule (400 mg total) by mouth 2 (two) times daily.     mirtazapine 15 MG tablet  Commonly known as:  REMERON  Take 1 tablet (15 mg total) by mouth every evening.     oxyCODONE-acetaminophen 5-325 mg per tablet  Commonly known as:  PERCOCET  Take 1 tablet by mouth once daily.     pantoprazole 40 MG tablet  Commonly known as:  PROTONIX  Take 1 tablet (40 mg total) by mouth once daily.     senna-docusate 8.6-50 mg 8.6-50 mg per tablet  Commonly known as:  PERICOLACE  Take 2 tablets by mouth 2 (two) times daily as needed for Constipation.        STOP taking these medications    spironolactone 25 MG tablet  Commonly known as:  ALDACTONE            Dyllan Herzog MD  Heart Transplant  [facility]

## 2019-10-30 NOTE — PROGRESS NOTES
Discharge Note    SWI to see pt for discharge. Pt presented as aaox4 with pleasant affect. Pt states in agreement with plan to discharge home today with [facility] (#[phone] fax#[phone]). Pt reports her mother will drive her home, and is currently in the [facility]. Pt reports coping well and denies further needs, questions, concerns at this time and none indicated. Providing psychosocial and counseling support, education, resources, assistance and discharge planning as indicated. SW remains available.

## 2019-10-30 NOTE — PLAN OF CARE
Pt remains free from fall and injuries. AAOx4. VSS. Patient placed on 1500 mL fluid restirction due to weight gain. Plan to discharge today. No complaints at this time.

## 2019-10-30 NOTE — NURSING
Pt AAO, VSS. Upon discharge, all PIV's were removed and AVS reviewed with pt. LVAD inventory completed for discharge. All questions were answered. Pt left via wheelchair with mother.

## 2019-10-30 NOTE — PLAN OF CARE
INR 3.4 and BUN/Creatine 1.5/23. Oral hydration promoted. Warfarin decreased to 1mg MWF. Hm3 SP 5200 F 3.7. Mexitil 150 mg to treat arrhythmia. H/H 36.6/10.7. Zofran administered for nausea. Mild relief obtained. Echo performed revealing 20% EF. POC reviewed with pt and pt verbalized understanding. VSS, AOX4, no falls, SOB, or complaints of pain. Pt in bed at lowest position with 2x upper side rails raised, call light within reach. Will continue to monitor.

## 2019-10-31 NOTE — PROGRESS NOTES
D/C 10/30:Deborah Navas is a 49 y.o. female s/p HM3 lvad from 9/10 admitted for evaluation of ICD shocks.   She was administered  IV amiodarone followed by oral load (EOT 11.9) and started on mexiletine. Her warfarin dose was held x 1 for an increased inr of 2.6 to 3.4. She was instructed to decrease her previous dose to 3mg MWF / 1.5mg on other days. Alprazolam was ordered from the [facility] pharmacy for anxiety surrounding the ICD shocks. Calendar updated per MAR, INR STAT requested today, 10/30.

## 2019-11-01 ENCOUNTER — ANTI-COAG VISIT (OUTPATIENT)
Dept: CARDIOLOGY | Facility: CLINIC | Age: 49
End: 2019-11-01
Payer: COMMERCIAL

## 2019-11-01 ENCOUNTER — PATIENT OUTREACH (OUTPATIENT)
Dept: ADMINISTRATIVE | Facility: CLINIC | Age: 49
End: 2019-11-01

## 2019-11-01 DIAGNOSIS — Z79.01 LONG TERM (CURRENT) USE OF ANTICOAGULANTS: ICD-10-CM

## 2019-11-01 DIAGNOSIS — Z95.811 LVAD (LEFT VENTRICULAR ASSIST DEVICE) PRESENT: ICD-10-CM

## 2019-11-01 LAB — INR PPP: 2.3

## 2019-11-01 PROCEDURE — 93793 PR ANTICOAGULANT MGMT FOR PT TAKING WARFARIN: ICD-10-PCS | Mod: S$GLB,,,

## 2019-11-01 PROCEDURE — 93793 ANTICOAG MGMT PT WARFARIN: CPT | Mod: S$GLB,,,

## 2019-11-04 ENCOUNTER — DOCUMENTATION ONLY (OUTPATIENT)
Dept: TRANSPLANT | Facility: CLINIC | Age: 49
End: 2019-11-04

## 2019-11-04 LAB
BUN BLD-MCNC: 20 MG/DL (ref 4–21)
CALCIUM SERPL-MCNC: 8.5 MG/DL
CREAT SERPL-MCNC: 1.4 MG/DL
POTASSIUM: 4.5
SODIUM: 142

## 2019-11-05 LAB — INR PPP: 2.8

## 2019-11-06 ENCOUNTER — ANTI-COAG VISIT (OUTPATIENT)
Dept: CARDIOLOGY | Facility: CLINIC | Age: 49
End: 2019-11-06
Payer: COMMERCIAL

## 2019-11-06 DIAGNOSIS — Z79.01 LONG TERM (CURRENT) USE OF ANTICOAGULANTS: ICD-10-CM

## 2019-11-06 DIAGNOSIS — Z95.811 LVAD (LEFT VENTRICULAR ASSIST DEVICE) PRESENT: ICD-10-CM

## 2019-11-06 PROCEDURE — 93793 ANTICOAG MGMT PT WARFARIN: CPT | Mod: S$GLB,,,

## 2019-11-06 PROCEDURE — 93793 PR ANTICOAGULANT MGMT FOR PT TAKING WARFARIN: ICD-10-PCS | Mod: S$GLB,,,

## 2019-11-07 DIAGNOSIS — Z95.811 LVAD (LEFT VENTRICULAR ASSIST DEVICE) PRESENT: Primary | ICD-10-CM

## 2019-11-07 LAB — INR PPP: 3.6

## 2019-11-08 ENCOUNTER — ANTI-COAG VISIT (OUTPATIENT)
Dept: CARDIOLOGY | Facility: CLINIC | Age: 49
End: 2019-11-08
Payer: COMMERCIAL

## 2019-11-08 DIAGNOSIS — Z95.811 LVAD (LEFT VENTRICULAR ASSIST DEVICE) PRESENT: ICD-10-CM

## 2019-11-08 DIAGNOSIS — I49.01 VENTRICULAR FIBRILLATION: ICD-10-CM

## 2019-11-08 DIAGNOSIS — Z95.810 ICD (IMPLANTABLE CARDIOVERTER-DEFIBRILLATOR) IN PLACE: Primary | ICD-10-CM

## 2019-11-08 DIAGNOSIS — Z79.01 LONG TERM (CURRENT) USE OF ANTICOAGULANTS: ICD-10-CM

## 2019-11-08 PROCEDURE — 93793 ANTICOAG MGMT PT WARFARIN: CPT | Mod: S$GLB,,,

## 2019-11-08 PROCEDURE — 93793 PR ANTICOAGULANT MGMT FOR PT TAKING WARFARIN: ICD-10-PCS | Mod: S$GLB,,,

## 2019-11-08 NOTE — PROGRESS NOTES
Patient advised 3 mg MW x  1.5mg on other days--Lower dose, 1/4 cup of broccolli/greens 11/8.  HH faxed      Pt remembers taking 4.5 mg wed

## 2019-11-08 NOTE — PROGRESS NOTES
INR not at goal. Medications, chart, and patient findings reviewed. See calendar for adjustments to dose and follow up plan.  Pt reports taking an increased dose on Wednesday.  This would likely put her INR higher than her result from yesterday.  Small amount of greens and hold x 1 and lower dose.

## 2019-11-11 ENCOUNTER — ANTI-COAG VISIT (OUTPATIENT)
Dept: CARDIOLOGY | Facility: CLINIC | Age: 49
End: 2019-11-11

## 2019-11-11 DIAGNOSIS — Z79.01 LONG TERM (CURRENT) USE OF ANTICOAGULANTS: ICD-10-CM

## 2019-11-11 DIAGNOSIS — Z95.811 LVAD (LEFT VENTRICULAR ASSIST DEVICE) PRESENT: ICD-10-CM

## 2019-11-11 LAB — INR PPP: 3.4

## 2019-11-11 NOTE — PROGRESS NOTES
Ms. Navas is a patient of Dr. Pace.      Subjective:   Patient ID:  Deborah Navas is a 49 y.o. female who presents for follow-up of Cardiomyopathy  .     HPI:    Ms. Navas is a 49 y.o. female with NICM, LVAD (9/2019), CRT-D, VF, AF/AFL here for hospital follow up.     Background:    Former patient of Dr. Norman in [facility]. Switching care to [facility].    Patient with PMH NICM s/p LVAD (HM3 9/10/19), medtronic CRT-D (upgrade 2/2019), HLD, remote VF, AF, AFL, V-fib reported in setting of hypokalemia with appropriate shock from ICD on Amiodarone prior to LVAD placement.  Consulted to EP for PNS after LVAD. LV lead reprogrammed with loss of PNS in new configuration (Resolved with switching LV 1-2 pacing with LV 4-3 pacing).  Mode switched to VVIR.    EP again consulted 9/23/2019 for AFL on device. Per patient, hx of afib in the past and was followed by Dr. Richard at [facility]. Previous history of afib per the patient +/- aflutter with noted baseline rhythm of aflutter on last device interrogation and symptomatic with lightheaded/dizziness.    9/24/2019: Successful pace-termination of atrial tachycardia/atrial flutter via reprogramming of dual-chamber ICD.    10/20/2019: She presented to her local ER, where device interrogation noted 7 episodes of VT (6 terminated by overdrive pacing and 1 shock) in setting of normal lytes. Of note, she was previously taking amiodarone 200mg BID. h/o LV lead stimulating diaphragm -> will reprogram to turn off LV lead and maximize AV delay. - would restart PO amiodarone with PO load. Consider 400mg BID x2 weeks followed by 400mg daily    10/24/2019: she was shocked an additional 2 times 10/23/2019 and 10/24/2019 MMVT of 3 different morphologies. Etiology: NICM. s/p IV load of amio -> would switch to PO amiodarone 400mg BID and agree with starting mexilitene 150 mg TID     Update (11/13/2019):    Today she says she feels very nauseated since  starting mexiletine. She says she does not feel when in VT - only if ICD discharges. Has chronic LEGER. Denies CP, palpitations, syncope. She says she has been on amiodarone since January. Mexiletine is new.     Switching from [facility] (Dr. Norman). She says she has been told she was not a good VT RFA candidate due to VT of multiple morphologies..      She is on coumadin. She is currently on amiodarone 400mg daily and mexiletine 150mg TID. LFTs are WNL. TSH is WNL. Last PFT on 9/10/2019 showed a DLCO ratio of 76.5. She has regular annual eye examinations.     Device Interrogation (11/13/2019) reveals an intrinsic SR with CHB with stable lead and device function. No atrial arrhythmias since pace-terminated AFL (post-op) in hospital 9/24/2019. Multiple VT episodes. 11/3/2019 MMVT successfully treated with ATPx1. 11/4/2019 MMVT successfully treated with ATPx1. 6 monitored slow VT events. Longest 11/3/2019 2.5 hours. Most recent 11/11/2019 51 seconds. She paces 0% in the RA and 99% in the RV (LV lead off). Estimated battery longevity 7.4 years. Optivol fluid accumulation 9/13/2019 - ongoing.    I have personally reviewed the patient's EKG today, which shows ASVP at 80bpm. QRS is 180. QTc is 503. LVAD interference noted.     Recent Cardiac Tests:    2D Echo (10/29/2019):  · LVAD present. Base speed is 5300 RPMs. The pump type is a Heartmate III.  · The interventricular septum appears midline. The aortic valve does not open.  · Severely decreased left ventricular systolic function. The estimated ejection fraction is 20%  · Grade III (severe) left ventricular diastolic dysfunction consistent with restrictive physiology.  · No wall motion abnormalities.  · Mildly reduced right ventricular systolic function.  · Mild right ventricular enlargement.  · Mild mitral regurgitation.  · Mild tricuspid regurgitation.  · The estimated PA systolic pressure is 27 mm Hg  · Elevated central venous pressure (15 mm Hg).  · Trivial circumferential  pericardial effusion.  · There is a left pleural effusion.    Current Outpatient Medications   Medication Sig    ALPRAZolam (XANAX) 0.25 MG tablet Take 1 tablet (0.25 mg total) by mouth 2 (two) times daily as needed for Anxiety.    amiodarone (PACERONE) 200 MG Tab Take 2 tablets (400mg) by mouth twice daily for 14 days then take 1 tablet (400mg) by mouth daily    aspirin (ECOTRIN) 325 MG EC tablet Take 1 tablet (325 mg total) by mouth once daily.    FLUZONE QUAD 2853-8136, PF, 60 mcg (15 mcg x 4)/0.5 mL Syrg TO BE ADMINISTERED BY PHARMACIST FOR IMMUNIZATION    furosemide (LASIX) 20 MG tablet Use as directed for weight gain >3 lbs    GABAPENTIN ORAL Take 200 mg by mouth 2 (two) times daily.    lisinopril 10 MG tablet Take 1 tablet (10 mg total) by mouth 2 (two) times daily.    magnesium oxide (MAG-OX) 400 mg (241.3 mg magnesium) tablet Take 1 tablet (400 mg total) by mouth once daily.    mexiletine (MEXITIL) 150 MG Cap Take 1 capsule (150 mg total) by mouth every 8 (eight) hours.    mirtazapine (REMERON) 15 MG tablet Take 1 tablet (15 mg total) by mouth every evening.    oxyCODONE-acetaminophen (PERCOCET) 5-325 mg per tablet Take 1 tablet by mouth once daily.    pantoprazole (PROTONIX) 40 MG tablet Take 1 tablet (40 mg total) by mouth once daily.    senna-docusate 8.6-50 mg (PERICOLACE) 8.6-50 mg per tablet Take 2 tablets by mouth 2 (two) times daily as needed for Constipation.    VENLAFAXINE HCL (EFFEXOR ORAL) Take 150 mg by mouth once daily.    warfarin (COUMADIN) 3 MG tablet Take 3mg orally daily every Mon/Wed/Fri and 1.5mg orally on all other days    zolpidem (AMBIEN) 5 MG Tab Take 5 mg by mouth nightly as needed.     No current facility-administered medications for this visit.      Review of Systems   Constitution: Positive for malaise/fatigue.   Cardiovascular: Negative for chest pain, dyspnea on exertion, irregular heartbeat, leg swelling and palpitations.   Respiratory: Negative for shortness  "of breath.    Hematologic/Lymphatic: Negative for bleeding problem.   Skin: Negative for rash.   Musculoskeletal: Negative for myalgias.   Gastrointestinal: Positive for nausea and vomiting. Negative for hematemesis and hematochezia.   Genitourinary: Negative for hematuria.   Neurological: Positive for light-headedness.   Psychiatric/Behavioral: Negative for altered mental status.   Allergic/Immunologic: Negative for persistent infections.     Objective:        Ht 5' 7" (1.702 m)   Wt 107 kg (235 lb 14.3 oz)   LMP  (LMP Unknown)   BMI 36.95 kg/m²     Physical Exam   Constitutional: She is oriented to person, place, and time. She appears well-developed and well-nourished.   HENT:   Head: Normocephalic.   Nose: Nose normal.   Eyes: Pupils are equal, round, and reactive to light.   Cardiovascular: Normal rate, regular rhythm, S1 normal and S2 normal.   No murmur heard.  Pulses:       Radial pulses are 2+ on the right side, and 2+ on the left side.   Pulmonary/Chest: Breath sounds normal. No respiratory distress.   Device to LUCW.   Abdominal: Normal appearance.   Musculoskeletal: Normal range of motion. She exhibits no edema.   Neurological: She is alert and oriented to person, place, and time.   Skin: Skin is warm and dry. No erythema.   Psychiatric: She has a normal mood and affect. Her speech is normal and behavior is normal.   Nursing note and vitals reviewed.    Lab Results   Component Value Date     11/13/2019     11/01/2019    K 3.9 11/13/2019    K 4.5 11/01/2019    MG 2.1 11/13/2019    BUN 19 11/13/2019    BUN 20 11/01/2019    CREATININE 1.7 (H) 11/13/2019    CREATININE 1.4 11/01/2019    ALT 16 11/13/2019    AST 20 11/13/2019    HGB 10.8 (L) 11/13/2019    HCT 38.5 11/13/2019    HCT 33 (L) 09/11/2019    TSH 2.290 09/06/2019    LDLCALC 45.8 (L) 09/06/2019       Recent Labs   Lab 11/05/19 11/07/19 11/11/19 11/13/19  1038   INR 2.8 3.6 3.4 2.6 H       Assessment:     1. Congestive cardiomyopathy  "   2. Essential hypertension    3. Ventricular fibrillation    4. Ventricular tachycardia    5. History of ventricular fibrillation    6. History of ventricular tachycardia    7. ICD (implantable cardioverter-defibrillator) in place    8. Long term (current) use of anticoagulants    9. LVAD (left ventricular assist device) present      Plan:     In summary, Ms. Navas is a 49 y.o. female with NICM, LVAD (9/2019), CRT-D, VF, AF/AFL here for hospital follow up.   Patient is here as a hospital follow up and to establish care. She continues to have multiple VT episodes with some ATP therapy, although no ICD shocks since starting mexiletine 10/24/2019. One slow VT episode 2.5hrs 11/3/2019. Patient asymptomatic with LVAD. On coumadin. No AFL since post-op event (paced out of rhythm 9/24/2019). CHB with 100% V pacing (LV lead off). She does not feel well. Dry heaving with mexiletine. Taking phenergan PRN. 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 Following up with HTS after this visit.    Patient to establish care with EP clinic for arrhythmia and device care.  Continue current medications.  Continue routine device checks.  RTC 6 months, sooner if indicated.    *A copy of this note has been sent to Dr. Pace*    Follow up in about 6 months (around 5/13/2020).    ------------------------------------------------------------------    JOSE Oconnor, NP-C  Cardiac Electrophysiology

## 2019-11-11 NOTE — PROGRESS NOTES
Verbal result taken from Lizzy/[phone]) Martin VELEZ/NELIDA_________. PT/INR _3.4______ Date drawn__11/11/19______ Hardcopy to be faxed.

## 2019-11-12 ENCOUNTER — TELEPHONE (OUTPATIENT)
Dept: ELECTROPHYSIOLOGY | Facility: CLINIC | Age: 49
End: 2019-11-12

## 2019-11-12 NOTE — TELEPHONE ENCOUNTER
Called Mrs. Navas to adv she will need her device checked prior to seeing Claribel tomorrow morning at 8:30AM.  Asked Mrs. Navas to be here for 8:00AM instead of our device team can check her device before she sees Claribel- she verbalized understanding & confirmed she will be here for then.

## 2019-11-13 ENCOUNTER — DOCUMENTATION ONLY (OUTPATIENT)
Dept: CARDIOTHORACIC SURGERY | Facility: CLINIC | Age: 49
End: 2019-11-13

## 2019-11-13 ENCOUNTER — CLINICAL SUPPORT (OUTPATIENT)
Dept: TRANSPLANT | Facility: CLINIC | Age: 49
End: 2019-11-13
Payer: COMMERCIAL

## 2019-11-13 ENCOUNTER — OFFICE VISIT (OUTPATIENT)
Dept: TRANSPLANT | Facility: CLINIC | Age: 49
End: 2019-11-13
Payer: COMMERCIAL

## 2019-11-13 ENCOUNTER — ANTI-COAG VISIT (OUTPATIENT)
Dept: CARDIOLOGY | Facility: CLINIC | Age: 49
End: 2019-11-13
Payer: COMMERCIAL

## 2019-11-13 ENCOUNTER — HOSPITAL ENCOUNTER (INPATIENT)
Facility: HOSPITAL | Age: 49
LOS: 45 days | Discharge: HOME HEALTH | DRG: 001 | End: 2019-12-30
Attending: INTERNAL MEDICINE | Admitting: INTERNAL MEDICINE
Payer: COMMERCIAL

## 2019-11-13 ENCOUNTER — HOSPITAL ENCOUNTER (OUTPATIENT)
Dept: PULMONOLOGY | Facility: CLINIC | Age: 49
Discharge: HOME | End: 2019-11-13
Payer: COMMERCIAL

## 2019-11-13 ENCOUNTER — OFFICE VISIT (OUTPATIENT)
Dept: ELECTROPHYSIOLOGY | Facility: CLINIC | Age: 49
End: 2019-11-13
Payer: COMMERCIAL

## 2019-11-13 ENCOUNTER — CLINICAL SUPPORT (OUTPATIENT)
Dept: CARDIOLOGY | Facility: HOSPITAL | Age: 49
DRG: 001 | End: 2019-11-13
Attending: INTERNAL MEDICINE
Payer: COMMERCIAL

## 2019-11-13 VITALS — HEIGHT: 67 IN | WEIGHT: 235.88 LBS | BODY MASS INDEX: 37.02 KG/M2

## 2019-11-13 VITALS — HEIGHT: 67 IN | BODY MASS INDEX: 36.88 KG/M2 | WEIGHT: 235 LBS

## 2019-11-13 VITALS
HEIGHT: 67 IN | WEIGHT: 234 LBS | BODY MASS INDEX: 36.73 KG/M2 | HEART RATE: 60 BPM | OXYGEN SATURATION: 99 % | SYSTOLIC BLOOD PRESSURE: 85 MMHG | TEMPERATURE: 99 F

## 2019-11-13 DIAGNOSIS — I47.20 VENTRICULAR TACHYCARDIA: ICD-10-CM

## 2019-11-13 DIAGNOSIS — I47.20 VT (VENTRICULAR TACHYCARDIA): ICD-10-CM

## 2019-11-13 DIAGNOSIS — N18.30 STAGE 3 CHRONIC KIDNEY DISEASE: ICD-10-CM

## 2019-11-13 DIAGNOSIS — Z79.01 ANTICOAGULATION MONITORING, INR RANGE 2-3: ICD-10-CM

## 2019-11-13 DIAGNOSIS — I49.9 ARRHYTHMIA: ICD-10-CM

## 2019-11-13 DIAGNOSIS — E87.5 HYPERKALEMIA: ICD-10-CM

## 2019-11-13 DIAGNOSIS — Z95.811 LVAD (LEFT VENTRICULAR ASSIST DEVICE) PRESENT: ICD-10-CM

## 2019-11-13 DIAGNOSIS — I50.9 CHF (CONGESTIVE HEART FAILURE): ICD-10-CM

## 2019-11-13 DIAGNOSIS — Z94.1 STATUS POST HEART TRANSPLANT: Primary | ICD-10-CM

## 2019-11-13 DIAGNOSIS — I50.810 RVF (RIGHT VENTRICULAR FAILURE): ICD-10-CM

## 2019-11-13 DIAGNOSIS — Z95.810 ICD (IMPLANTABLE CARDIOVERTER-DEFIBRILLATOR) IN PLACE: ICD-10-CM

## 2019-11-13 DIAGNOSIS — I49.01 VENTRICULAR FIBRILLATION: ICD-10-CM

## 2019-11-13 DIAGNOSIS — I10 ESSENTIAL HYPERTENSION: ICD-10-CM

## 2019-11-13 DIAGNOSIS — T82.9XXA LEFT VENTRICULAR ASSIST DEVICE (LVAD) COMPLICATION: ICD-10-CM

## 2019-11-13 DIAGNOSIS — N18.9 CHRONIC KIDNEY DISEASE, UNSPECIFIED CKD STAGE: ICD-10-CM

## 2019-11-13 DIAGNOSIS — T38.0X5S ADVERSE EFFECT OF ADRENAL CORTICAL STEROIDS, SEQUELA: ICD-10-CM

## 2019-11-13 DIAGNOSIS — Z76.82 ORGAN TRANSPLANT CANDIDATE: ICD-10-CM

## 2019-11-13 DIAGNOSIS — Z79.01 LONG TERM (CURRENT) USE OF ANTICOAGULANTS: ICD-10-CM

## 2019-11-13 DIAGNOSIS — D84.9 IMMUNOSUPPRESSION: ICD-10-CM

## 2019-11-13 DIAGNOSIS — I50.43 ACUTE ON CHRONIC COMBINED SYSTOLIC AND DIASTOLIC HEART FAILURE: ICD-10-CM

## 2019-11-13 DIAGNOSIS — Z95.811 LVAD (LEFT VENTRICULAR ASSIST DEVICE) PRESENT: Primary | ICD-10-CM

## 2019-11-13 DIAGNOSIS — I42.0 CONGESTIVE CARDIOMYOPATHY: Primary | ICD-10-CM

## 2019-11-13 DIAGNOSIS — R11.0 NAUSEA: ICD-10-CM

## 2019-11-13 DIAGNOSIS — Z76.82 KIDNEY TRANSPLANT CANDIDATE: ICD-10-CM

## 2019-11-13 DIAGNOSIS — Z86.79 HISTORY OF VENTRICULAR FIBRILLATION: ICD-10-CM

## 2019-11-13 DIAGNOSIS — Z94.1 HEART TRANSPLANTED: ICD-10-CM

## 2019-11-13 DIAGNOSIS — N17.9 AKI (ACUTE KIDNEY INJURY): ICD-10-CM

## 2019-11-13 DIAGNOSIS — I50.9 HEART FAILURE: ICD-10-CM

## 2019-11-13 DIAGNOSIS — Z86.79 HISTORY OF VENTRICULAR TACHYCARDIA: ICD-10-CM

## 2019-11-13 DIAGNOSIS — T82.9XXD COMPLICATION INVOLVING LEFT VENTRICULAR ASSIST DEVICE (LVAD), SUBSEQUENT ENCOUNTER: ICD-10-CM

## 2019-11-13 DIAGNOSIS — R07.9 CHEST PAIN: ICD-10-CM

## 2019-11-13 DIAGNOSIS — Z76.82 HEART TRANSPLANT CANDIDATE: ICD-10-CM

## 2019-11-13 DIAGNOSIS — R73.9 STRESS HYPERGLYCEMIA: ICD-10-CM

## 2019-11-13 DIAGNOSIS — I50.43 ACUTE ON CHRONIC SYSTOLIC AND DIASTOLIC HEART FAILURE, NYHA CLASS 4: ICD-10-CM

## 2019-11-13 LAB
ASCENDING AORTA: 2.96 CM
BSA FOR ECHO PROCEDURE: 2.25 M2
CV ECHO LV RWT: 0.27 CM
DOP CALC LVOT AREA: 2.2 CM2
DOP CALC LVOT DIAMETER: 1.67 CM
ECHO LV POSTERIOR WALL: 0.84 CM (ref 0.6–1.1)
FRACTIONAL SHORTENING: 22 % (ref 28–44)
INTERVENTRICULAR SEPTUM: 0.71 CM (ref 0.6–1.1)
LA MAJOR: 4.75 CM
LA MINOR: 4.97 CM
LA WIDTH: 4.84 CM
LEFT ATRIUM SIZE: 4.22 CM
LEFT ATRIUM VOLUME INDEX: 38.9 ML/M2
LEFT ATRIUM VOLUME: 84.33 CM3
LEFT INTERNAL DIMENSION IN SYSTOLE: 4.94 CM (ref 2.1–4)
LEFT VENTRICLE DIASTOLIC VOLUME INDEX: 90.85 ML/M2
LEFT VENTRICLE DIASTOLIC VOLUME: 197.05 ML
LEFT VENTRICLE MASS INDEX: 90 G/M2
LEFT VENTRICLE SYSTOLIC VOLUME INDEX: 53.1 ML/M2
LEFT VENTRICLE SYSTOLIC VOLUME: 115.2 ML
LEFT VENTRICULAR INTERNAL DIMENSION IN DIASTOLE: 6.3 CM (ref 3.5–6)
LEFT VENTRICULAR MASS: 195.03 G
PISA TR MAX VEL: 1.8 M/S
RA MAJOR: 5.13 CM
RA WIDTH: 4.21 CM
RIGHT VENTRICULAR END-DIASTOLIC DIMENSION: 4.47 CM
SINUS: 3.12 CM
STJ: 2.85 CM
TR MAX PG: 13 MMHG
TRICUSPID ANNULAR PLANE SYSTOLIC EXCURSION: 2.46 CM

## 2019-11-13 PROCEDURE — 3008F PR BODY MASS INDEX (BMI) DOCUMENTED: ICD-10-PCS | Mod: CPTII,NTX,S$GLB, | Performed by: INTERNAL MEDICINE

## 2019-11-13 PROCEDURE — 93793 ANTICOAG MGMT PT WARFARIN: CPT | Mod: S$GLB,,,

## 2019-11-13 PROCEDURE — 63600175 PHARM REV CODE 636 W HCPCS: Mod: NTX | Performed by: PHYSICIAN ASSISTANT

## 2019-11-13 PROCEDURE — 94618 PULMONARY STRESS TESTING: ICD-10-PCS | Mod: NTX,S$GLB,, | Performed by: INTERNAL MEDICINE

## 2019-11-13 PROCEDURE — 3008F BODY MASS INDEX DOCD: CPT | Mod: CPTII,NTX,S$GLB, | Performed by: NURSE PRACTITIONER

## 2019-11-13 PROCEDURE — 99214 PR OFFICE/OUTPT VISIT, EST, LEVL IV, 30-39 MIN: ICD-10-PCS | Mod: NTX,S$GLB,, | Performed by: NURSE PRACTITIONER

## 2019-11-13 PROCEDURE — 94618 PULMONARY STRESS TESTING: CPT | Mod: NTX,S$GLB,, | Performed by: INTERNAL MEDICINE

## 2019-11-13 PROCEDURE — 93010 ELECTROCARDIOGRAM REPORT: CPT | Mod: NTX,S$GLB,, | Performed by: INTERNAL MEDICINE

## 2019-11-13 PROCEDURE — 99214 OFFICE O/P EST MOD 30 MIN: CPT | Mod: NTX,S$GLB,, | Performed by: INTERNAL MEDICINE

## 2019-11-13 PROCEDURE — 99999 PR PBB SHADOW E&M-EST. PATIENT-LVL III: CPT | Mod: PBBFAC,TXP,, | Performed by: INTERNAL MEDICINE

## 2019-11-13 PROCEDURE — 99999 PR PBB SHADOW E&M-EST. PATIENT-LVL II: ICD-10-PCS | Mod: PBBFAC,TXP,, | Performed by: NURSE PRACTITIONER

## 2019-11-13 PROCEDURE — G0378 HOSPITAL OBSERVATION PER HR: HCPCS | Mod: NTX

## 2019-11-13 PROCEDURE — 93750 PR INTERROGATE VENT ASSIST DEV, IN PERSON, W PHYSICIAN ANALYSIS: ICD-10-PCS | Mod: NTX,,, | Performed by: INTERNAL MEDICINE

## 2019-11-13 PROCEDURE — 93793 PR ANTICOAGULANT MGMT FOR PT TAKING WARFARIN: ICD-10-PCS | Mod: S$GLB,,,

## 2019-11-13 PROCEDURE — 93005 RHYTHM STRIP: ICD-10-PCS | Mod: NTX,S$GLB,, | Performed by: INTERNAL MEDICINE

## 2019-11-13 PROCEDURE — G0379 DIRECT REFER HOSPITAL OBSERV: HCPCS | Mod: NTX

## 2019-11-13 PROCEDURE — 99223 1ST HOSP IP/OBS HIGH 75: CPT | Mod: NTX,,, | Performed by: INTERNAL MEDICINE

## 2019-11-13 PROCEDURE — 99999 PR PBB SHADOW E&M-EST. PATIENT-LVL I: CPT | Mod: PBBFAC,TXP,,

## 2019-11-13 PROCEDURE — 93005 ELECTROCARDIOGRAM TRACING: CPT | Mod: NTX,S$GLB,, | Performed by: INTERNAL MEDICINE

## 2019-11-13 PROCEDURE — 99999 PR PBB SHADOW E&M-EST. PATIENT-LVL III: ICD-10-PCS | Mod: PBBFAC,TXP,, | Performed by: INTERNAL MEDICINE

## 2019-11-13 PROCEDURE — 99999 PR PBB SHADOW E&M-EST. PATIENT-LVL I: ICD-10-PCS | Mod: PBBFAC,TXP,,

## 2019-11-13 PROCEDURE — 3008F BODY MASS INDEX DOCD: CPT | Mod: CPTII,NTX,S$GLB, | Performed by: INTERNAL MEDICINE

## 2019-11-13 PROCEDURE — 99999 PR PBB SHADOW E&M-EST. PATIENT-LVL II: CPT | Mod: PBBFAC,TXP,, | Performed by: NURSE PRACTITIONER

## 2019-11-13 PROCEDURE — 25000003 PHARM REV CODE 250: Mod: NTX | Performed by: PHYSICIAN ASSISTANT

## 2019-11-13 PROCEDURE — 93750 INTERROGATION VAD IN PERSON: CPT | Mod: NTX,,, | Performed by: INTERNAL MEDICINE

## 2019-11-13 PROCEDURE — 99214 PR OFFICE/OUTPT VISIT, EST, LEVL IV, 30-39 MIN: ICD-10-PCS | Mod: NTX,S$GLB,, | Performed by: INTERNAL MEDICINE

## 2019-11-13 PROCEDURE — 93283 PRGRMG EVAL IMPLANTABLE DFB: CPT | Mod: TXP

## 2019-11-13 PROCEDURE — 93010 RHYTHM STRIP: ICD-10-PCS | Mod: NTX,S$GLB,, | Performed by: INTERNAL MEDICINE

## 2019-11-13 PROCEDURE — 99214 OFFICE O/P EST MOD 30 MIN: CPT | Mod: NTX,S$GLB,, | Performed by: NURSE PRACTITIONER

## 2019-11-13 PROCEDURE — 99223 PR INITIAL HOSPITAL CARE,LEVL III: ICD-10-PCS | Mod: NTX,,, | Performed by: INTERNAL MEDICINE

## 2019-11-13 PROCEDURE — 3008F PR BODY MASS INDEX (BMI) DOCUMENTED: ICD-10-PCS | Mod: CPTII,NTX,S$GLB, | Performed by: NURSE PRACTITIONER

## 2019-11-13 PROCEDURE — 27000248 HC VAD-ADDITIONAL DAY: Mod: NTX

## 2019-11-13 RX ORDER — AMOXICILLIN 250 MG
2 CAPSULE ORAL 2 TIMES DAILY PRN
Status: DISCONTINUED | OUTPATIENT
Start: 2019-11-13 | End: 2019-12-03

## 2019-11-13 RX ORDER — VENLAFAXINE 75 MG/1
150 TABLET ORAL DAILY
Status: DISCONTINUED | OUTPATIENT
Start: 2019-11-13 | End: 2019-12-16

## 2019-11-13 RX ORDER — LISINOPRIL 10 MG/1
10 TABLET ORAL 2 TIMES DAILY
Status: DISCONTINUED | OUTPATIENT
Start: 2019-11-13 | End: 2019-12-01

## 2019-11-13 RX ORDER — PANTOPRAZOLE SODIUM 40 MG/1
40 TABLET, DELAYED RELEASE ORAL DAILY
Status: DISCONTINUED | OUTPATIENT
Start: 2019-11-13 | End: 2019-11-29

## 2019-11-13 RX ORDER — ZOLPIDEM TARTRATE 5 MG/1
5 TABLET ORAL NIGHTLY PRN
Status: DISCONTINUED | OUTPATIENT
Start: 2019-11-13 | End: 2019-12-16

## 2019-11-13 RX ORDER — MEXILETINE HYDROCHLORIDE 150 MG/1
150 CAPSULE ORAL EVERY 8 HOURS
Status: DISCONTINUED | OUTPATIENT
Start: 2019-11-13 | End: 2019-11-26

## 2019-11-13 RX ORDER — ASPIRIN 325 MG
325 TABLET, DELAYED RELEASE (ENTERIC COATED) ORAL DAILY
Status: DISCONTINUED | OUTPATIENT
Start: 2019-11-14 | End: 2019-12-16

## 2019-11-13 RX ORDER — AMIODARONE HYDROCHLORIDE 200 MG/1
400 TABLET ORAL 2 TIMES DAILY
Status: DISCONTINUED | OUTPATIENT
Start: 2019-11-13 | End: 2019-11-14

## 2019-11-13 RX ORDER — GABAPENTIN 100 MG/1
200 CAPSULE ORAL 2 TIMES DAILY
Status: DISCONTINUED | OUTPATIENT
Start: 2019-11-13 | End: 2019-12-16

## 2019-11-13 RX ORDER — ALPRAZOLAM 0.25 MG/1
0.25 TABLET ORAL 2 TIMES DAILY PRN
Status: DISCONTINUED | OUTPATIENT
Start: 2019-11-13 | End: 2019-12-16

## 2019-11-13 RX ORDER — PROMETHAZINE HYDROCHLORIDE 12.5 MG/1
12.5 TABLET ORAL EVERY 4 HOURS PRN
Status: DISCONTINUED | OUTPATIENT
Start: 2019-11-13 | End: 2019-12-16

## 2019-11-13 RX ORDER — ZOLPIDEM TARTRATE 5 MG/1
5 TABLET ORAL NIGHTLY PRN
Status: ON HOLD | COMMUNITY
End: 2019-12-30 | Stop reason: HOSPADM

## 2019-11-13 RX ORDER — WARFARIN 3 MG/1
3 TABLET ORAL DAILY
Status: DISCONTINUED | OUTPATIENT
Start: 2019-11-13 | End: 2019-11-14

## 2019-11-13 RX ORDER — FUROSEMIDE 10 MG/ML
80 INJECTION INTRAMUSCULAR; INTRAVENOUS 3 TIMES DAILY
Status: DISCONTINUED | OUTPATIENT
Start: 2019-11-13 | End: 2019-11-14

## 2019-11-13 RX ORDER — MIRTAZAPINE 15 MG/1
15 TABLET, FILM COATED ORAL NIGHTLY
Status: DISCONTINUED | OUTPATIENT
Start: 2019-11-13 | End: 2019-12-16

## 2019-11-13 RX ORDER — LANOLIN ALCOHOL/MO/W.PET/CERES
400 CREAM (GRAM) TOPICAL DAILY
Status: DISCONTINUED | OUTPATIENT
Start: 2019-11-13 | End: 2019-12-16

## 2019-11-13 RX ADMIN — PANTOPRAZOLE SODIUM 40 MG: 40 TABLET, DELAYED RELEASE ORAL at 04:11

## 2019-11-13 RX ADMIN — MEXILETINE HYDROCHLORIDE 150 MG: 150 CAPSULE ORAL at 10:11

## 2019-11-13 RX ADMIN — MEXILETINE HYDROCHLORIDE 150 MG: 150 CAPSULE ORAL at 04:11

## 2019-11-13 RX ADMIN — MAGNESIUM OXIDE TAB 400 MG (241.3 MG ELEMENTAL MG) 400 MG: 400 (241.3 MG) TAB at 04:11

## 2019-11-13 RX ADMIN — LISINOPRIL 10 MG: 10 TABLET ORAL at 10:11

## 2019-11-13 RX ADMIN — FUROSEMIDE 80 MG: 10 INJECTION, SOLUTION INTRAMUSCULAR; INTRAVENOUS at 04:11

## 2019-11-13 RX ADMIN — ZOLPIDEM TARTRATE 5 MG: 5 TABLET ORAL at 10:11

## 2019-11-13 RX ADMIN — FUROSEMIDE 80 MG: 10 INJECTION, SOLUTION INTRAMUSCULAR; INTRAVENOUS at 10:11

## 2019-11-13 RX ADMIN — MIRTAZAPINE 15 MG: 15 TABLET, FILM COATED ORAL at 10:11

## 2019-11-13 RX ADMIN — WARFARIN SODIUM 3 MG: 3 TABLET ORAL at 04:11

## 2019-11-13 RX ADMIN — GABAPENTIN 200 MG: 100 CAPSULE ORAL at 10:11

## 2019-11-13 RX ADMIN — AMIODARONE HYDROCHLORIDE 400 MG: 200 TABLET ORAL at 10:11

## 2019-11-13 NOTE — PROGRESS NOTES
Pt presented for 3 month follow-up and verbalized consent and willingness to continue to participate with the [employer] registry.      Patient completed the EQ-5D quality of life questionnaire, KCCQ, and the Trail Making neurocognitive test in 1 minute and 23 seconds.

## 2019-11-13 NOTE — SUBJECTIVE & OBJECTIVE
Past Medical History:   Diagnosis Date    Fractures     History of ventricular fibrillation 9/4/2019    History of ventricular tachycardia 9/4/2019    Hyperlipidemia     Migraine     Osteoarthritis        Past Surgical History:   Procedure Laterality Date    BACK SURGERY      2007    BONE GRAFT Left     from Left hip to Left FA    eardrum reconstruction  1980    ELBOW SURGERY Left [id number]    FOREARM FRACTURE SURGERY Bilateral [id number]    multiple surgeries    INSERTION OF GRAFT TO PERICARDIUM  9/10/2019    Procedure: INSERTION, GRAFT, PERICARDIUM;  Surgeon: Shar Walden MD;  Location: [facility] OR [address] [place] [address];  Service: Cardiovascular;;    INSERTION OF IMPLANTABLE CARDIOVERTER-DEFIBRILLATOR (ICD) GENERATOR WITH TWO EXISTING LEADS      INSERTION OF PACEMAKER Left     LEFT VENTRICULAR ASSIST DEVICE N/A 9/10/2019    Procedure: INSERTION-LEFT VENTRICULAR ASSIST DEVICE;  Surgeon: Shar Walden MD;  Location: [facility] OR [address] [place] [address];  Service: Cardiovascular;  Laterality: N/A;  DT HM3     LUMBAR FUSION  2007    L4-L5    RIGHT HEART CATHETERIZATION Right 9/4/2019    Procedure: INSERTION, CATHETER, RIGHT HEART;  Surgeon: Vi Bryant MD;  Location: [facility] CATH LAB;  Service: Cardiology;  Laterality: Right;    SINUS SURGERY Right 1994    with lymph nodes    STERNAL WOUND CLOSURE  9/10/2019    Procedure: CLOSURE, WOUND, STERNUM;  Surgeon: Shar Walden MD;  Location: [facility] OR [address] [place] [address];  Service: Cardiovascular;;    TEMPOROMANDIBULAR JOINT SURGERY Right 1988    TONSILLECTOMY      TREATMENT OF CARDIAC ARRHYTHMIA N/A 9/24/2019    Procedure: CARDIOVERSION;  Surgeon: Moe Barrera MD;  Location: [facility] EP LAB;  Service: Cardiology;  Laterality: N/A;  AF, DCCV/NADINE, anes, DM, Rm 3073    TYMPANOSTOMY TUBE PLACEMENT  1971- 1979    multiple tube placements       Review of patient's allergies indicates:   Allergen Reactions    Adhesive Blisters     Reaction to area in chest and up only    Codeine Itching       Current  Facility-Administered Medications   Medication    ALPRAZolam tablet 0.25 mg    amiodarone tablet 400 mg    [START ON 11/14/2019] aspirin EC tablet 325 mg    furosemide injection 80 mg    gabapentin capsule 200 mg    lisinopril tablet 10 mg    magnesium oxide tablet 400 mg    mexiletine capsule 150 mg    mirtazapine tablet 15 mg    pantoprazole EC tablet 40 mg    promethazine tablet 12.5 mg    senna-docusate 8.6-50 mg per tablet 2 tablet    venlafaxine tablet 150 mg    warfarin (COUMADIN) tablet 3 mg    zolpidem tablet 5 mg     Family History     Problem Relation (Age of Onset)    Broken bones Maternal Grandmother    Cancer Paternal Grandmother    Dislocations Maternal Grandmother    Heart failure Paternal Grandfather, Maternal Grandfather    Migraines Mother, Maternal Grandmother, Paternal Grandmother, Paternal Grandfather, Maternal Grandfather    Obesity Paternal Grandmother    Osteoarthritis Mother, Maternal Grandmother, Paternal Grandmother, Paternal Grandfather, Maternal Grandfather, Father    Osteoporosis Maternal Grandmother    Scoliosis Maternal Grandmother    Stroke Father        Tobacco Use    Smoking status: Never Smoker    Smokeless tobacco: Never Used   Substance and Sexual Activity    Alcohol use: No    Drug use: No    Sexual activity: Not Currently     Review of Systems  Objective:     Vital Signs (Most Recent):  Temp: 97.8 °F (36.6 °C) (11/13/19 1400)  Pulse: 109 (11/13/19 1437)  Resp: 18 (11/13/19 1437)  BP: (!) 88/0 (11/13/19 1437)  SpO2: 95 % (11/13/19 1437) Vital Signs (24h Range):  Temp:  [97.8 °F (36.6 °C)-98.5 °F (36.9 °C)] 97.8 °F (36.6 °C)  Pulse:  [] 109  Resp:  [18] 18  SpO2:  [95 %-99 %] 95 %  BP: (85-88)/(0) 88/0     Patient Vitals for the past 72 hrs (Last 3 readings):   Weight   11/13/19 1437 107 kg (235 lb 14.3 oz)     Body mass index is 36.95 kg/m².    No intake or output data in the 24 hours ending 11/13/19 1539    Physical Exam  Constitutional: laying in  bed NAD  Neck: Normal range of motion. Neck supple. JVD elevation to upper neck with her laying on stretcher at about 30 degrees.   Cardiovascular: Normal rate, regular rhythm, Smooth VAD hum. No murmur heard.  Pulmonary/Chest: Effort normal. No stridor. No respiratory distress. She has decreased breath sounds in the left lower field. She has no wheezes. She has no rales.   Abdominal: Soft. Normal appearance, normal aorta and bowel sounds are normal. She exhibits no distension. There is no tenderness.   Musculoskeletal: Normal range of motion. She exhibits no edema or tenderness.   Neurological: She is alert and oriented to person, place, and time. She has normal strength and normal reflexes. Coordination normal.   Skin: Skin is warm and dry. No rash noted. No erythema.     Significant Labs:  CBC:  Recent Labs   Lab 11/13/19  1038   WBC 5.79   RBC 4.35   HGB 10.8*   HCT 38.5      MCV 89   MCH 24.8*   MCHC 28.1*     BNP:  Recent Labs   Lab 11/13/19  1038   *     CMP:  Recent Labs   Lab 11/13/19  1038   *   CALCIUM 9.6   ALBUMIN 3.6   PROT 7.2      K 3.9   CO2 23      BUN 19   CREATININE 1.7*   ALKPHOS 92   ALT 16   AST 20   BILITOT 0.6      Coagulation:   Recent Labs   Lab 11/07/19 11/11/19 11/13/19  1038   INR 3.6 3.4 2.6*     LDH:  Recent Labs   Lab 11/13/19  1038        Microbiology:  Microbiology Results (last 7 days)     ** No results found for the last 168 hours. **          I have reviewed all pertinent labs within the past 24 hours.    Diagnostic Results:  I have reviewed all pertinent imaging results/findings within the past 24 hours.

## 2019-11-13 NOTE — LETTER
2019    Deborah Navas  [address]  [place] [address]                [address]  [facility] [address]  Phone: [phone]  Fax: [phone] To Whom It May Concern:     Ms. Deborah Navas ( [birth year]) was admitted to the hospital on 19. She is currently on the ICU after receiving a heart transplant on 19. During her stay, she is required to have caregiver at the bedside.     Please excuse your employer, Ms. Flavia Nuñez, from her position at her job on  and  so that she can be present as Ms. Navas's caregiver in the hospital.     If you have any further questions or concerns, please feel free to reach out at any time.         Adry Kimball, KULWINDER  Heart Transplant   ([phone])

## 2019-11-13 NOTE — ASSESSMENT & PLAN NOTE
-Per EP office visit on day of admission: She continues to have multiple VT episodes with some ATP therapy, although no ICD shocks since starting mexiletine 10/24/2019. One slow VT episode 2.5hrs 11/3/2019. Patient asymptomatic with LVAD. On coumadin. No AFL since post-op event (paced out of rhythm 9/24/2019). CHB with 100% V pacing (LV lead off).   -Plan was to continue current regimen despite Mexiletine making her nauseous

## 2019-11-13 NOTE — ASSESSMENT & PLAN NOTE
-hx low flow alarms prior to admit  -Possibly secondary to ADHF.  -Echo and CT of chest, abdomen and pelvis ordered (non contrast as creat elevated above baseline)  -Will monitor response to diuresis  -BP seems controlled on admission

## 2019-11-13 NOTE — ASSESSMENT & PLAN NOTE
-Patient is non-pulsatile  -Doppler goal 60-90 mmHg  -Antihypertensive medications include  Lisinopril,  -Will continue current regimen and adjust as needed

## 2019-11-13 NOTE — LETTER
November 19, 2019        Joaquin Del Toro  [address]  [address]  [facility] [address]  Phone: [phone]  Fax: [phone]             [facility] [address]  [facility] [address]  Phone: [phone]   Patient: Deborah Navas   MR Number: [id number]   YOB: 1970   Date of Visit: 11/13/2019       Dear Dr. Joaquin Del Toro    Thank you for referring Deborah Navas to me for evaluation. Attached you will find relevant portions of my assessment and plan of care.    If you have questions, please do not hesitate to call me. I look forward to following Deborah Navas along with you.    Sincerely,    Miriam Prieto MD    Enclosure    If you would like to receive this communication electronically, please contact [email or web address] or [phone] to request [employer] Link access.    [employer] Link is a tool which provides read-only access to select patient information with whom you have a relationship. Its easy to use and provides real time access to review your patients record including encounter summaries, notes, results, and demographic information.    If you feel you have received this communication in error or would no longer like to receive these types of communications, please e-mail [email or web address]

## 2019-11-13 NOTE — Clinical Note
The PA catheter is repositioned to the main pulmonary artery. Hemodynamics performed. Cardiac output obtained at 4 L/min. O2 saturation measured at 52%.

## 2019-11-13 NOTE — HPI
48 yo WF with NICM, s/p HM3 implantation 9/10/19 (post up coarse uncomplicated with the exception of+ diaphragmatic stimulation of LV lead and pleural effusion with chest tube placement, atrial flutter with NADINE/DCCV), V-fib reported in setting of hypokalemia with appropriate shock from ICD on Amiodarone prior to LVAD placement; and recent hospitalizations for ventricular arrythmias; started on Mexilitine.  She was seen in EP clinic today and she continues to have multiple VT episodes with some ATP therapy, although no ICD shocks since starting mexiletine 10/24/2019. One slow VT episode 2.5hrs 11/3/2019. Patient asymptomatic with LVAD. On coumadin. No AFL since post-op event (paced out of rhythm 9/24/2019). CHB with 100% V pacing (LV lead off). She does not feel well. Dry heaving with mexiletine. Taking phenergan PRN. 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   She was seen in HTS clinic and reported 4 low flow alarms the last 4 nights.  She reports they occur in her sleep and last for only a few seconds.  By the time she wakes up; they quickly stop.  She does report to possibly being little volume overloaded.  She hasn't noticed weight gain at home but thinks she may have little extra fluid.  She denies SOB, chest pain, palpitations, LEGER. In review of her records: she was 223lbs on 10/24/19 during previous hospitalization and is 235lbs now.  Her lasix had previously been decreased to prn.

## 2019-11-13 NOTE — Clinical Note
The PA catheter is repositioned to the pulmonary wedge. Hemodynamics performed. O2 saturation measured at 95%.

## 2019-11-13 NOTE — H&P
[facility]  Heart Transplant  H&P    Patient Name: Deborah Navas  MRN: [id number]  Admission Date: 11/13/2019  Attending Physician: Vi Bryant MD  Primary Care Provider: Primary Doctor No  Principal Problem:<principal problem not specified>    Subjective:     History of Present Illness:  48 yo WF with NICM, s/p HM3 implantation 9/10/19 (post up coarse uncomplicated with the exception of+ diaphragmatic stimulation of LV lead and pleural effusion with chest tube placement, atrial flutter with NADINE/DCCV), V-fib reported in setting of hypokalemia with appropriate shock from ICD on Amiodarone prior to LVAD placement; and recent hospitalizations for ventricular arrythmias; started on Mexilitine.  She was seen in EP clinic today and she continues to have multiple VT episodes with some ATP therapy, although no ICD shocks since starting mexiletine 10/24/2019. One slow VT episode 2.5hrs 11/3/2019. Patient asymptomatic with LVAD. On coumadin. No AFL since post-op event (paced out of rhythm 9/24/2019). CHB with 100% V pacing (LV lead off). She does not feel well. Dry heaving with mexiletine. Taking phenergan PRN. 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   She was seen in HTS clinic and reported 4 low flow alarms the last 4 nights.  She reports they occur in her sleep and last for only a few seconds.  By the time she wakes up; they quickly stop.  She does report to possibly being little volume overloaded.  She hasn't noticed weight gain at home but thinks she may have little extra fluid.  She denies SOB, chest pain, palpitations, LEGER. In review of her records: she was 223lbs on 10/24/19 during previous hospitalization and is 235lbs now.  Her lasix had previously been decreased to prn.     Past Medical History:   Diagnosis Date    Fractures     History of ventricular fibrillation 9/4/2019    History of ventricular tachycardia 9/4/2019    Hyperlipidemia      Migraine     Osteoarthritis        Past Surgical History:   Procedure Laterality Date    BACK SURGERY      2007    BONE GRAFT Left     from Left hip to Left FA    eardrum reconstruction  1980    ELBOW SURGERY Left [id number]    FOREARM FRACTURE SURGERY Bilateral [id number]    multiple surgeries    INSERTION OF GRAFT TO PERICARDIUM  9/10/2019    Procedure: INSERTION, GRAFT, PERICARDIUM;  Surgeon: Shar Walden MD;  Location: [address];  Service: Cardiovascular;;    INSERTION OF IMPLANTABLE CARDIOVERTER-DEFIBRILLATOR (ICD) GENERATOR WITH TWO EXISTING LEADS      INSERTION OF PACEMAKER Left     LEFT VENTRICULAR ASSIST DEVICE N/A 9/10/2019    Procedure: INSERTION-LEFT VENTRICULAR ASSIST DEVICE;  Surgeon: Shar Walden MD;  Location: [facility] OR [address] [place] [address];  Service: Cardiovascular;  Laterality: N/A;  DT HM3     LUMBAR FUSION  2007    L4-L5    RIGHT HEART CATHETERIZATION Right 9/4/2019    Procedure: INSERTION, CATHETER, RIGHT HEART;  Surgeon: Vi Bryant MD;  Location: [facility] CATH LAB;  Service: Cardiology;  Laterality: Right;    SINUS SURGERY Right 1994    with lymph nodes    STERNAL WOUND CLOSURE  9/10/2019    Procedure: CLOSURE, WOUND, STERNUM;  Surgeon: Shar Walden MD;  Location: [facility] OR [address] [place] [address];  Service: Cardiovascular;;    TEMPOROMANDIBULAR JOINT SURGERY Right 1988    TONSILLECTOMY      TREATMENT OF CARDIAC ARRHYTHMIA N/A 9/24/2019    Procedure: CARDIOVERSION;  Surgeon: Moe Barrera MD;  Location: [facility] EP LAB;  Service: Cardiology;  Laterality: N/A;  AF, DCCV/NADINE, anes, DM, Rm 3073    TYMPANOSTOMY TUBE PLACEMENT  1971- 1979    multiple tube placements       Review of patient's allergies indicates:   Allergen Reactions    Adhesive Blisters     Reaction to area in chest and up only    Codeine Itching       Current Facility-Administered Medications   Medication    ALPRAZolam tablet 0.25 mg    amiodarone tablet 400 mg    [START ON 11/14/2019] aspirin EC tablet 325 mg    furosemide  injection 80 mg    gabapentin capsule 200 mg    lisinopril tablet 10 mg    magnesium oxide tablet 400 mg    mexiletine capsule 150 mg    mirtazapine tablet 15 mg    pantoprazole EC tablet 40 mg    promethazine tablet 12.5 mg    senna-docusate 8.6-50 mg per tablet 2 tablet    venlafaxine tablet 150 mg    warfarin (COUMADIN) tablet 3 mg    zolpidem tablet 5 mg     Family History     Problem Relation (Age of Onset)    Broken bones Maternal Grandmother    Cancer Paternal Grandmother    Dislocations Maternal Grandmother    Heart failure Paternal Grandfather, Maternal Grandfather    Migraines Mother, Maternal Grandmother, Paternal Grandmother, Paternal Grandfather, Maternal Grandfather    Obesity Paternal Grandmother    Osteoarthritis Mother, Maternal Grandmother, Paternal Grandmother, Paternal Grandfather, Maternal Grandfather, Father    Osteoporosis Maternal Grandmother    Scoliosis Maternal Grandmother    Stroke Father        Tobacco Use    Smoking status: Never Smoker    Smokeless tobacco: Never Used   Substance and Sexual Activity    Alcohol use: No    Drug use: No    Sexual activity: Not Currently     Review of Systems  Objective:     Vital Signs (Most Recent):  Temp: 97.8 °F (36.6 °C) (11/13/19 1400)  Pulse: 109 (11/13/19 1437)  Resp: 18 (11/13/19 1437)  BP: (!) 88/0 (11/13/19 1437)  SpO2: 95 % (11/13/19 1437) Vital Signs (24h Range):  Temp:  [97.8 °F (36.6 °C)-98.5 °F (36.9 °C)] 97.8 °F (36.6 °C)  Pulse:  [] 109  Resp:  [18] 18  SpO2:  [95 %-99 %] 95 %  BP: (85-88)/(0) 88/0     Patient Vitals for the past 72 hrs (Last 3 readings):   Weight   11/13/19 1437 107 kg (235 lb 14.3 oz)     Body mass index is 36.95 kg/m².    No intake or output data in the 24 hours ending 11/13/19 1539    Physical Exam  Constitutional: laying in bed NAD  Neck: Normal range of motion. Neck supple. JVD elevation to upper neck with her laying on stretcher at about 30 degrees.   Cardiovascular: Normal rate, regular  rhythm, Smooth VAD hum. No murmur heard.  Pulmonary/Chest: Effort normal. No stridor. No respiratory distress. She has decreased breath sounds in the left lower field. She has no wheezes. She has no rales.   Abdominal: Soft. Normal appearance, normal aorta and bowel sounds are normal. She exhibits no distension. There is no tenderness.   Musculoskeletal: Normal range of motion. She exhibits no edema or tenderness.   Neurological: She is alert and oriented to person, place, and time. She has normal strength and normal reflexes. Coordination normal.   Skin: Skin is warm and dry. No rash noted. No erythema.     Significant Labs:  CBC:  Recent Labs   Lab 11/13/19  1038   WBC 5.79   RBC 4.35   HGB 10.8*   HCT 38.5      MCV 89   MCH 24.8*   MCHC 28.1*     BNP:  Recent Labs   Lab 11/13/19  1038   *     CMP:  Recent Labs   Lab 11/13/19  1038   *   CALCIUM 9.6   ALBUMIN 3.6   PROT 7.2      K 3.9   CO2 23      BUN 19   CREATININE 1.7*   ALKPHOS 92   ALT 16   AST 20   BILITOT 0.6      Coagulation:   Recent Labs   Lab 11/07/19 11/11/19 11/13/19  1038   INR 3.6 3.4 2.6*     LDH:  Recent Labs   Lab 11/13/19  1038        Microbiology:  Microbiology Results (last 7 days)     ** No results found for the last 168 hours. **          I have reviewed all pertinent labs within the past 24 hours.    Diagnostic Results:  I have reviewed all pertinent imaging results/findings within the past 24 hours.    Assessment/Plan:     Left ventricular assist device (LVAD) complication  -hx low flow alarms prior to admit  -Possibly secondary to ADHF.  -Echo and CT of chest, abdomen and pelvis ordered (non contrast as creat elevated above baseline)  -Will monitor response to diuresis  -BP seems controlled on admission        LVAD (left ventricular assist device) present  -HeartMate 3 Implanted 9/10/19 as DT.  -Implanted by Dr. Walden  -INR slightly therapeutic. Goal INR 2.0-3.0. Continue Coumadin.  Previous home  dose was 3mg on Wed and 1.5mg QDaily  -Antiplatelets:  mg.  -LDH is stable Will monitor daily.   -Speed set at 5300; 2D echo ordered on admission   -Not listed for OHTx: was declined due to pulmonary hypertension and incomplete care giving plan    Procedure: Device Interrogation Including analysis of device parameters  Current Settings: Ventricular Assist Device  Review of device function is stable    TXP LVAD INTERROGATIONS 11/13/2019 10/30/2019 10/30/2019 10/30/2019 10/30/2019 10/29/2019 10/29/2019   Type HeartMate3 HeartMate3 HeartMate3 HeartMate3 HeartMate3 HeartMate3 HeartMate3   Flow 4.0 3.7 3.8 4.2 3.7 3.5 3.7   Speed 5300 5300 5300 5300 5300 5300 5300   PI 4.0 3.7 3.9 3.4 2.9 5.8 4.0   Power (Orellana) 3.7 3.7 3.6 3.7 3.6 3.6 3.7   LSL 4900 4900 4900 - - 4900 4900   Pulsatility No Pulse Intermittent pulse Intermittent pulse Intermittent pulse Intermittent pulse Pulse -       Ventricular tachycardia  -Per EP office visit on day of admission: She continues to have multiple VT episodes with some ATP therapy, although no ICD shocks since starting mexiletine 10/24/2019. One slow VT episode 2.5hrs 11/3/2019. Patient asymptomatic with LVAD. On coumadin. No AFL since post-op event (paced out of rhythm 9/24/2019). CHB with 100% V pacing (LV lead off).   -Plan was to continue current regimen despite Mexiletine making her nauseous     Acute on chronic combined systolic and diastolic heart failure  -NICM  -Last 2D Echo 10/29/19: LVEF 20%, LVEDD 5.24 cm with speed at 5300  -Appears hypervolemic on exam; neck veins, weight and BNP up.  Will diurese with IV Lasix and monitor response.  If renal function does not improve with diuresis then will plan for RHC tomorrow  -2g Na dietary restriction, 1500 mL fluid restriction, strict I/Os      CKD (chronic kidney disease)  -Baseline creat appears 1.3-1.6  -Will monitor with diuresis     Essential hypertension  -Patient is non-pulsatile  -Doppler goal 60-90  mmHg  -Antihypertensive medications include  Lisinopril,  -Will continue current regimen and adjust as needed    ICD (implantable cardioverter-defibrillator) in place  -Medtronic ICD  -See above VT      ZAC Kelly  Heart Transplant  [facility]

## 2019-11-13 NOTE — LETTER
2019    Deborah Navas  [address]  [place] [address]                [address]  [facility] [address]  Phone: [phone]  Fax: [phone] To Whom It May Concern:     Ms. Deborah Navas ( [birth year]) was admitted to the hospital on 19. She is currently on the ICU after receiving a heart transplant on 19. During her stay, she is required to have caregiver at the bedside.     Please excuse your employee, Ms. Flavia Nuñez, from her position at her job on  and  so that she can be present as Ms. Navas's caregiver in the hospital.     If you have any further questions or concerns, please feel free to reach out at any time.         Adry Kimball, KULWINDER  Heart Transplant   ([phone])

## 2019-11-13 NOTE — PROGRESS NOTES
11/13/19 1437 11/13/19 1658   Vital Signs   BP (!) 88/0 (!) 92/0   BP Location Left arm Left arm   BP Method Doppler Doppler   Patient Position Sitting Sitting   pt is an LVAD heart mate III patient. BP was slightly elevated as noted. No BP medication prescribe. Notified MD and no further order received.

## 2019-11-13 NOTE — PROGRESS NOTES
"Date of Implant with Heartmate 3 LVAD: 9/10/19    PATIENT ARRIVED IN CLINIC:  Ambulatory   Accompanied by: caregiver, cousin    Vitals  Temperature, oral:   Temp Readings from Last 1 Encounters:   19 98.5 °F (36.9 °C) (Oral)     Blood Pressure:   BP Readings from Last 3 Encounters:   19 (!) 85/0   10/30/19 (!) 80/0   10/24/19 92/67        VAD Interrogation:  TXP RON INTERROGATIONS 2019 10/30/2019 10/30/2019   Type HeartMate3 - -   Flow 3.9 - -   Speed 5300 - -   PI 3.3 - -   Power (Orellana) 4.3 - -   LSL 4900 - -   Pulsatility No Pulse Intermittent pulse Intermittent pulse       Flow in history: 2.4-4 LPM  History [id number]  Problems / Issues / Alarms with VAD if any: low flow alarms noted at night, x3 days. Unable to go past 19. Log files sent to [employer].   VAD Sounds: HM3 sound Smooth  Heartmate 3 Module Cable:  No yellow exposed and Attempted to unscrew modular cable to ensure it will be able to come lose in the event we ever need to change the modular cable while patient held the driveline in place so it would not move. Modular cable connection able to be unscrewed and re-tightened. Instructed pt to perform this weekly.    HCT:   Lab Results   Component Value Date    HCT 38.5 2019    HCT 33 (L) 2019       Complaints/reason for visit today: recent discharge  Emergency Equipment With Patient: yes   VAD Binder With Patient: no   Reviewed VAD Numbers In Binder: no    Any Equipment Issues: None noted (Refer to  note for complete details)    DLES Assessment:  Appearance Of Driveline: "2" with sutures, suture removed today, as have been in for ~8 weeks. DLES not incorporated, scant, thin, serous drainage from DLES.        Antibiotics: NO  Velour: no  Manual & Visual Inspection Of Driveline: No kinks or tears noted  Stabilization Device In Use: yes, parrish securement device      Patient MyChart Questionnaire: No flowsheet data found.     Assessment:   PAIN: " NO  Complaints Of Nausea / Vomiting: yes, nausea since discharge    Appearance and Frequency Of Stools: normal and formed without blood & every other day  Color Of Urine: clear/yellow  Coping/Depression/Anxiety: anxious and depressed  Sleep Habits: 6 hrs /night  Sleep Aids: Abmien  Showering: No  Activity/Exercise: PT 3x/week   Driving: No.    DLES Dressing Care:   Frequency of Dressing Changes: daily & soap and water dressing  Pt In Need Of Management Kits?:yes -   1 Box of soap and water dressing    Labs:    Chemistry        Component Value Date/Time     11/13/2019 1038     11/01/2019    K 3.9 11/13/2019 1038    K 4.5 11/01/2019     11/13/2019 1038    CL 24 10/10/2019    CO2 23 11/13/2019 1038    BUN 19 11/13/2019 1038    BUN 20 11/01/2019    CREATININE 1.7 (H) 11/13/2019 1038    CREATININE 1.4 11/01/2019     (H) 11/13/2019 1038        Component Value Date/Time    CALCIUM 9.6 11/13/2019 1038    CALCIUM 8.5 11/01/2019    ALKPHOS 92 11/13/2019 1038    AST 20 11/13/2019 1038    ALT 16 11/13/2019 1038    BILITOT 0.6 11/13/2019 1038    ESTGFRAFRICA 40.0 (A) 11/13/2019 1038    EGFRNONAA 34.7 (A) 11/13/2019 1038            Magnesium   Date Value Ref Range Status   11/13/2019 2.1 1.6 - 2.6 mg/dL Final       Lab Results   Component Value Date    WBC 5.79 11/13/2019    HGB 10.8 (L) 11/13/2019    HCT 38.5 11/13/2019    MCV 89 11/13/2019     11/13/2019       Lab Results   Component Value Date    INR 2.6 (H) 11/13/2019    INR 3.4 11/11/2019    INR 3.6 11/07/2019       BNP   Date Value Ref Range Status   11/13/2019 661 (H) 0 - 99 pg/mL Final     Comment:     Values of less than 100 pg/ml are consistent with non-CHF populations.   10/30/2019 260 (H) 0 - 99 pg/mL Final     Comment:     Values of less than 100 pg/ml are consistent with non-CHF populations.   10/28/2019 376 (H) 0 - 99 pg/mL Final     Comment:     Values of less than 100 pg/ml are consistent with non-CHF populations.       LD   Date  Value Ref Range Status   11/13/2019 234 110 - 260 U/L Final     Comment:     Results are increased in hemolyzed samples.   10/30/2019 244 110 - 260 U/L Final     Comment:     Results are increased in hemolyzed samples.   10/29/2019 276 (H) 110 - 260 U/L Final     Comment:     Results are increased in hemolyzed samples.       Labs reviewed with patient: YES      Patient Satisfaction Survey completed per patient: No  (explained about signature and box to check)  Medication reconciliation: per MA.  Medication Detail updated today: yes  Coumadin Managed by: [facility]    Education: Reviewed driveline care, emergency procedures, how to change the controller, alarms with patient, as well as discussed how to page the VAD coordinator in case of an emergency. It is medically necessary to have VAD management kits in order to prevent infection or to assist in the healing of an infected DLES.     Plans/Needs: Patient seen in clinic for post-discharge f/u. Has been nauseated since discharge, unable to eat very much food. She has basically been eating small bites of food and drinking ginger ale. She has been noticing low flow alarms when sleeping. She will change positions and the alarms will resolve. The longest alarm lasted ~9 seconds.     Patient to be admitted for observation. Dr. Bryant notified by Dr. Prieto.     Hurricane Season: Yes, discussed with patient: With hurricane season approaching, we want to make sure you are fully prepared for any emergency.  Should the National Weather Service or your local authorities recommend a voluntary or mandatory evacuation of your area, The VAD team requires you to evacuate to a safe place.  Remember, when it is a mandatory evacuation, traffic will become an issue for your limited battery power.  Therefore, we strongly urge you to evacuate early.    The VAD team advises you to have the following in place before hurricane season:  Have an evacuation plan in place including  places to evacuate, names and phone numbers.  This information is required to be given to the VAD coordinator.   1. Have your VAD emergency contact numbers with you.   2. Make sure your prescriptions will not run out by the end of September.   3. Make sure you have enough medications, including pills, inhalers, patches, etc. to take, should you be gone for more than 2 weeks.   4. Make sure ALL of your batteries are fully charged.   5. Bring enough dressing change supplies to last for at least 2 weeks.   6. Bring your VAD binder with you.  Make sure your binder is updated and complete with alarms reference card, patient hand book, emergency contact numbers, daily log sheets, etc.  If you do not have family or friends as an evacuation destination, we recommend evacuating to a safe area.   Do NOT evacuate to [facility].    The VAD team wants to stress the importance of planning for your evacuation in the event of a hurricane.  If you have any LVAD questions or issues, please contact the LVAD coordinator.

## 2019-11-13 NOTE — ASSESSMENT & PLAN NOTE
-HeartMate 3 Implanted 9/10/19 as DT.  -Implanted by Dr. Walden  -INR slightly therapeutic. Goal INR 2.0-3.0. Continue Coumadin.  Previous home dose was 3mg on Wed and 1.5mg QDaily  -Antiplatelets:  mg.  -LDH is stable Will monitor daily.   -Speed set at 5300; 2D echo ordered on admission   -Not listed for OHTx: was declined due to pulmonary hypertension and incomplete care giving plan    Procedure: Device Interrogation Including analysis of device parameters  Current Settings: Ventricular Assist Device  Review of device function is stable    TXP LVAD INTERROGATIONS 11/13/2019 10/30/2019 10/30/2019 10/30/2019 10/30/2019 10/29/2019 10/29/2019   Type HeartMate3 HeartMate3 HeartMate3 HeartMate3 HeartMate3 HeartMate3 HeartMate3   Flow 4.0 3.7 3.8 4.2 3.7 3.5 3.7   Speed 5300 5300 5300 5300 5300 5300 5300   PI 4.0 3.7 3.9 3.4 2.9 5.8 4.0   Power (Orellana) 3.7 3.7 3.6 3.7 3.6 3.6 3.7   LSL 4900 4900 4900 - - 4900 4900   Pulsatility No Pulse Intermittent pulse Intermittent pulse Intermittent pulse Intermittent pulse Pulse -

## 2019-11-14 LAB
ANION GAP SERPL CALC-SCNC: 11 MMOL/L (ref 8–16)
ANION GAP SERPL CALC-SCNC: 11 MMOL/L (ref 8–16)
APTT BLDCRRT: 33.8 SEC (ref 21–32)
BASOPHILS # BLD AUTO: 0.04 K/UL (ref 0–0.2)
BASOPHILS NFR BLD: 1 % (ref 0–1.9)
BUN SERPL-MCNC: 19 MG/DL (ref 6–20)
BUN SERPL-MCNC: 20 MG/DL (ref 6–20)
CALCIUM SERPL-MCNC: 9.6 MG/DL (ref 8.7–10.5)
CALCIUM SERPL-MCNC: 9.7 MG/DL (ref 8.7–10.5)
CHLORIDE SERPL-SCNC: 104 MMOL/L (ref 95–110)
CHLORIDE SERPL-SCNC: 105 MMOL/L (ref 95–110)
CO2 SERPL-SCNC: 25 MMOL/L (ref 23–29)
CO2 SERPL-SCNC: 29 MMOL/L (ref 23–29)
CREAT SERPL-MCNC: 1.6 MG/DL (ref 0.5–1.4)
CREAT SERPL-MCNC: 1.6 MG/DL (ref 0.5–1.4)
DIFFERENTIAL METHOD: ABNORMAL
EOSINOPHIL # BLD AUTO: 0.2 K/UL (ref 0–0.5)
EOSINOPHIL NFR BLD: 3.9 % (ref 0–8)
ERYTHROCYTE [DISTWIDTH] IN BLOOD BY AUTOMATED COUNT: 17 % (ref 11.5–14.5)
EST. GFR  (AFRICAN AMERICAN): 43 ML/MIN/1.73 M^2
EST. GFR  (AFRICAN AMERICAN): 43 ML/MIN/1.73 M^2
EST. GFR  (NON AFRICAN AMERICAN): 37.3 ML/MIN/1.73 M^2
EST. GFR  (NON AFRICAN AMERICAN): 37.3 ML/MIN/1.73 M^2
GLUCOSE SERPL-MCNC: 126 MG/DL (ref 70–110)
GLUCOSE SERPL-MCNC: 97 MG/DL (ref 70–110)
HCT VFR BLD AUTO: 39.2 % (ref 37–48.5)
HGB BLD-MCNC: 11.2 G/DL (ref 12–16)
IMM GRANULOCYTES # BLD AUTO: 0.01 K/UL (ref 0–0.04)
IMM GRANULOCYTES NFR BLD AUTO: 0.3 % (ref 0–0.5)
INR PPP: 2.3 (ref 0.8–1.2)
LDH SERPL L TO P-CCNC: 227 U/L (ref 110–260)
LYMPHOCYTES # BLD AUTO: 0.9 K/UL (ref 1–4.8)
LYMPHOCYTES NFR BLD: 23.7 % (ref 18–48)
MAGNESIUM SERPL-MCNC: 1.9 MG/DL (ref 1.6–2.6)
MCH RBC QN AUTO: 24.9 PG (ref 27–31)
MCHC RBC AUTO-ENTMCNC: 28.6 G/DL (ref 32–36)
MCV RBC AUTO: 87 FL (ref 82–98)
MONOCYTES # BLD AUTO: 0.3 K/UL (ref 0.3–1)
MONOCYTES NFR BLD: 7.6 % (ref 4–15)
NEUTROPHILS # BLD AUTO: 2.4 K/UL (ref 1.8–7.7)
NEUTROPHILS NFR BLD: 63.5 % (ref 38–73)
NRBC BLD-RTO: 0 /100 WBC
PHOSPHATE SERPL-MCNC: 3.7 MG/DL (ref 2.7–4.5)
PLATELET # BLD AUTO: 290 K/UL (ref 150–350)
PMV BLD AUTO: 9.2 FL (ref 9.2–12.9)
POTASSIUM SERPL-SCNC: 3.1 MMOL/L (ref 3.5–5.1)
POTASSIUM SERPL-SCNC: 3.8 MMOL/L (ref 3.5–5.1)
PROTHROMBIN TIME: 22.1 SEC (ref 9–12.5)
RBC # BLD AUTO: 4.49 M/UL (ref 4–5.4)
SODIUM SERPL-SCNC: 140 MMOL/L (ref 136–145)
SODIUM SERPL-SCNC: 145 MMOL/L (ref 136–145)
WBC # BLD AUTO: 3.84 K/UL (ref 3.9–12.7)

## 2019-11-14 PROCEDURE — 93750 INTERROGATION VAD IN PERSON: CPT | Mod: NTX,,, | Performed by: INTERNAL MEDICINE

## 2019-11-14 PROCEDURE — 80048 BASIC METABOLIC PNL TOTAL CA: CPT | Mod: 91,NTX

## 2019-11-14 PROCEDURE — 83615 LACTATE (LD) (LDH) ENZYME: CPT | Mod: NTX

## 2019-11-14 PROCEDURE — 83735 ASSAY OF MAGNESIUM: CPT | Mod: NTX

## 2019-11-14 PROCEDURE — 85730 THROMBOPLASTIN TIME PARTIAL: CPT | Mod: NTX

## 2019-11-14 PROCEDURE — 99233 SBSQ HOSP IP/OBS HIGH 50: CPT | Mod: 25,NTX,, | Performed by: INTERNAL MEDICINE

## 2019-11-14 PROCEDURE — 85610 PROTHROMBIN TIME: CPT | Mod: NTX

## 2019-11-14 PROCEDURE — 84100 ASSAY OF PHOSPHORUS: CPT | Mod: NTX

## 2019-11-14 PROCEDURE — 99233 PR SUBSEQUENT HOSPITAL CARE,LEVL III: ICD-10-PCS | Mod: 25,NTX,, | Performed by: INTERNAL MEDICINE

## 2019-11-14 PROCEDURE — 25000003 PHARM REV CODE 250: Mod: NTX | Performed by: INTERNAL MEDICINE

## 2019-11-14 PROCEDURE — 63600175 PHARM REV CODE 636 W HCPCS: Mod: NTX | Performed by: PHYSICIAN ASSISTANT

## 2019-11-14 PROCEDURE — 25000003 PHARM REV CODE 250: Mod: NTX | Performed by: PHYSICIAN ASSISTANT

## 2019-11-14 PROCEDURE — 80048 BASIC METABOLIC PNL TOTAL CA: CPT | Mod: NTX

## 2019-11-14 PROCEDURE — 93750 PR INTERROGATE VENT ASSIST DEV, IN PERSON, W PHYSICIAN ANALYSIS: ICD-10-PCS | Mod: NTX,,, | Performed by: INTERNAL MEDICINE

## 2019-11-14 PROCEDURE — 25000003 PHARM REV CODE 250: Mod: NTX | Performed by: STUDENT IN AN ORGANIZED HEALTH CARE EDUCATION/TRAINING PROGRAM

## 2019-11-14 PROCEDURE — 27000248 HC VAD-ADDITIONAL DAY: Mod: NTX

## 2019-11-14 PROCEDURE — 85025 COMPLETE CBC W/AUTO DIFF WBC: CPT | Mod: NTX

## 2019-11-14 PROCEDURE — G0378 HOSPITAL OBSERVATION PER HR: HCPCS | Mod: NTX

## 2019-11-14 PROCEDURE — 36415 COLL VENOUS BLD VENIPUNCTURE: CPT | Mod: NTX

## 2019-11-14 PROCEDURE — 63600175 PHARM REV CODE 636 W HCPCS: Mod: NTX | Performed by: STUDENT IN AN ORGANIZED HEALTH CARE EDUCATION/TRAINING PROGRAM

## 2019-11-14 RX ORDER — POTASSIUM CHLORIDE 20 MEQ/1
20 TABLET, EXTENDED RELEASE ORAL ONCE
Status: DISCONTINUED | OUTPATIENT
Start: 2019-11-14 | End: 2019-11-14

## 2019-11-14 RX ORDER — AMIODARONE HYDROCHLORIDE 200 MG/1
400 TABLET ORAL DAILY
Status: DISCONTINUED | OUTPATIENT
Start: 2019-11-15 | End: 2019-11-25

## 2019-11-14 RX ORDER — POTASSIUM CHLORIDE 20 MEQ/1
40 TABLET, EXTENDED RELEASE ORAL ONCE
Status: COMPLETED | OUTPATIENT
Start: 2019-11-14 | End: 2019-11-14

## 2019-11-14 RX ORDER — FUROSEMIDE 40 MG/1
40 TABLET ORAL DAILY
Status: DISCONTINUED | OUTPATIENT
Start: 2019-11-15 | End: 2019-11-15

## 2019-11-14 RX ORDER — HYDRALAZINE HYDROCHLORIDE 20 MG/ML
5 INJECTION INTRAMUSCULAR; INTRAVENOUS ONCE
Status: COMPLETED | OUTPATIENT
Start: 2019-11-14 | End: 2019-11-14

## 2019-11-14 RX ORDER — POTASSIUM CHLORIDE 20 MEQ/1
60 TABLET, EXTENDED RELEASE ORAL ONCE
Status: COMPLETED | OUTPATIENT
Start: 2019-11-14 | End: 2019-11-14

## 2019-11-14 RX ORDER — HYDRALAZINE HYDROCHLORIDE 25 MG/1
25 TABLET, FILM COATED ORAL EVERY 8 HOURS
Status: DISCONTINUED | OUTPATIENT
Start: 2019-11-14 | End: 2019-11-16

## 2019-11-14 RX ORDER — LANOLIN ALCOHOL/MO/W.PET/CERES
400 CREAM (GRAM) TOPICAL ONCE
Status: COMPLETED | OUTPATIENT
Start: 2019-11-14 | End: 2019-11-14

## 2019-11-14 RX ADMIN — FUROSEMIDE 80 MG: 10 INJECTION, SOLUTION INTRAMUSCULAR; INTRAVENOUS at 08:11

## 2019-11-14 RX ADMIN — PROMETHAZINE HYDROCHLORIDE 12.5 MG: 12.5 TABLET ORAL at 12:11

## 2019-11-14 RX ADMIN — MIRTAZAPINE 15 MG: 15 TABLET, FILM COATED ORAL at 09:11

## 2019-11-14 RX ADMIN — LISINOPRIL 10 MG: 10 TABLET ORAL at 09:11

## 2019-11-14 RX ADMIN — MEXILETINE HYDROCHLORIDE 150 MG: 150 CAPSULE ORAL at 05:11

## 2019-11-14 RX ADMIN — AMIODARONE HYDROCHLORIDE 400 MG: 200 TABLET ORAL at 08:11

## 2019-11-14 RX ADMIN — VENLAFAXINE 150 MG: 37.5 TABLET ORAL at 08:11

## 2019-11-14 RX ADMIN — HYDRALAZINE HYDROCHLORIDE 25 MG: 25 TABLET ORAL at 09:11

## 2019-11-14 RX ADMIN — WARFARIN SODIUM 1.5 MG: 1 TABLET ORAL at 05:11

## 2019-11-14 RX ADMIN — LISINOPRIL 10 MG: 10 TABLET ORAL at 08:11

## 2019-11-14 RX ADMIN — MEXILETINE HYDROCHLORIDE 150 MG: 150 CAPSULE ORAL at 03:11

## 2019-11-14 RX ADMIN — POTASSIUM CHLORIDE 60 MEQ: 1500 TABLET, EXTENDED RELEASE ORAL at 07:11

## 2019-11-14 RX ADMIN — HYDRALAZINE HYDROCHLORIDE 25 MG: 25 TABLET ORAL at 03:11

## 2019-11-14 RX ADMIN — MEXILETINE HYDROCHLORIDE 150 MG: 150 CAPSULE ORAL at 09:11

## 2019-11-14 RX ADMIN — ASPIRIN 325 MG: 325 TABLET, DELAYED RELEASE ORAL at 08:11

## 2019-11-14 RX ADMIN — GABAPENTIN 200 MG: 100 CAPSULE ORAL at 08:11

## 2019-11-14 RX ADMIN — HYDRALAZINE HYDROCHLORIDE 5 MG: 20 INJECTION INTRAMUSCULAR; INTRAVENOUS at 03:11

## 2019-11-14 RX ADMIN — HYDRALAZINE HYDROCHLORIDE 25 MG: 25 TABLET ORAL at 05:11

## 2019-11-14 RX ADMIN — GABAPENTIN 200 MG: 100 CAPSULE ORAL at 09:11

## 2019-11-14 RX ADMIN — MAGNESIUM OXIDE TAB 400 MG (241.3 MG ELEMENTAL MG) 400 MG: 400 (241.3 MG) TAB at 08:11

## 2019-11-14 RX ADMIN — FUROSEMIDE 80 MG: 10 INJECTION, SOLUTION INTRAMUSCULAR; INTRAVENOUS at 03:11

## 2019-11-14 RX ADMIN — POTASSIUM CHLORIDE 40 MEQ: 1500 TABLET, EXTENDED RELEASE ORAL at 08:11

## 2019-11-14 RX ADMIN — Medication 400 MG: at 08:11

## 2019-11-14 RX ADMIN — PROMETHAZINE HYDROCHLORIDE 12.5 MG: 12.5 TABLET ORAL at 07:11

## 2019-11-14 RX ADMIN — PANTOPRAZOLE SODIUM 40 MG: 40 TABLET, DELAYED RELEASE ORAL at 08:11

## 2019-11-14 NOTE — CARE UPDATE
Called re K of 3.1, also w recent 10 beat run of asymptomatic NSVT. Cr 1.6. Has only 22G IV, that has not tolerated IV KCl previously. Will give 60mEq PO KCl now, another 20 in 1-2 hrs and recheck BMP @ 1300. Discussed w RN.    Abhijit Ordaz MD  PGY-4, Cardiology  Pager [phone]

## 2019-11-14 NOTE — PROGRESS NOTES
Admit Note     Met with patient and friend to assess needs. Patient is a 49 y.o. single female, admitted for ventricular tachycardia, heart failure, CKD, essential hypertension and LVAD complications.  Pt received her LVAD on 9/10/19. The pt's friend does the dressing changes.      Patient admitted from clinic on 11/13/2019 .  At this time, patient presents as alert and oriented x 4, good eye contact, calm and communicative.  At this time, patients caregiver presents as alert and oriented x 4, good eye contact, calm and communicative.    Household/Family Systems     Patient resides with patient's friend, Flavia at    [address] [place] [address]    When able the pt plans to return to her own home at :  [address]  [place] [address]       Support system includes mother, sister and many friends.    Patient does not have dependents that are need of being cared for.   The pt does not have any children.  Flavia and the pt's mother are her caregivers for LVAD and transplant.      Patients primary caregiver is self with assistance as need from Flavia.   Pt's cell:  [phone]    Emergency contacts:  Cristal Navas (mother, lives in [place], [place], five hours from [place], works part time, drives and is one of the pt's HCPA) [phone].  Sharonda Kitchen (sister, lives in [place], HCPA) [phone]  Flavia Nuñez (best friend,lives in [place], works full time and drives, HCPA) [phone]    During admission, patient's caregiver plans to stay in patient's room. Pt's friend is currently in attendance, however she needs to return to work so the pt's mother will be in house soon.     Confirmed patient and patients caregivers do have access to reliable transportation.    Cognitive Status/Learning     Patient reports reading ability as college and states patient does not have difficulty with reading, writing, seeing, hearing and memory. Pt does wear glasses. Pt reports needed support from learning and  comprehending new information.    Patient reports patient learns best by visual.      Needed: No.   Highest education level: Attended College/Technical School    Vocation/Disability   .  Working for Income: No  If no, reason not working: Demands of Treatment/Disability  Patient is receiving short term disability from work.  Pt reports short term disability will end when the pt is cleared by MD to return to work. Pt is an  from a home health and hospice [employer]  Pt reports she will be able to work some from work.   Pt reports no financial changes since last admission.     Adherence     Patient reports a high level of adherence to patients health care regimen. Pt reports she has not been eating much due to nausea over the few weeks.   Adherence counseling and education provided. Patient verbalizes understanding.    Substance Use    Patient reports the following substance usage.    Tobacco: none, patient denies any use.  Alcohol: none, patient denies any use.  Illicit Drugs/Non-prescribed Medications: none, patient denies any use.  Patient states clear understanding of the potential impact of substance use.  Substance abstinence/cessation counseling, education and resources provided and reviewed.     Services Utilizing/ADLS    Infusion Service: Prior to admission, patient utilizing? no  Home Health: Prior to admission, patient utilizing? yes. [phone], fax [phone].  Pt is seeing the home health nurse 1x per week and PT/OT twice a week.   DME: Prior to admission, no  Pulmonary/Cardiac Rehab: Prior to admission, no, Pt has participated in a cardiac rehab program in the past   Dialysis:  Prior to admission, no  Transplant Specialty Pharmacy:  Prior to admission, no.    Prior to admission, patient reports patient was somewhat  independent with ADLS.  Pt reports difficulty with fatigue and has not been able to do much physical activity due to nausea.   Pt  was not   Driving due to ICD and fatigue. Pt's friend and mother drive.    Patient reports patient is not able to care for self at this time due to compromised medical condition (as documented in medical record) and physical weakness..  Patient indicates a willingness to care for self once medically cleared to do so.    Insurance/Medications    Insured by   Payor/Plan Subscr  Sex Relation Sub. Ins. ID Effective Group Num   1. BLUE CROSS BL* YON VILLARREAL* 1970 Female  [id number]* [id number]                                   [address] [address]      Primary Insurance (for UNOS reporting): Private Insurance  Secondary Insurance (for UNOS reporting): None    Patient reports patient is able to obtain and afford medications at this time and at time of discharge.    Living Will/Healthcare Power of     Patient states patient has a LW and/or HCPA. See emergency contact list.     provided education regarding LW and HCPA and the completion of forms.    Coping/Mental Health    Patient is coping adequately with the aid of  family members and friends.   Patient indicates mental health difficulties.   The pt reports difficulty with depression and increased anxiety due to complex needs and multiple admissions since LVAD. The pt reports she takes Effexor for depression (pt reports she has been taking this medication for the last twenty years) and Remeron to assist with sleep. The pt reports she will also take Ambien when needed for sleep.  Pt reports she has Xanax as needed.  Pt reports she did not feel much relief when taking the Xanax, however the pt's friend Flavia reports she noticed the pt experienced some relief.    The pt reports she does have a good support system.    Worker provided emotional support and encouragement.     Discharge Planning    At time of discharge, patient plans to return to her friend Flavia's home  under the care of self and Flavia.  Patients mother or friend Flavia will transport  patient.  Per rounds today, expected discharge date has not been medically determined at this time. Patient and patients caregiver  verbalize understanding and are involved in treatment planning and discharge process.    Additional Concerns    Patient is being followed for needs, education, resources, information, emotional support, supportive counseling, and for supportive and skilled discharge plan of care.  providing ongoing psychosocial support, education, resources and d/c planning as needed.  SW remains available. Patient's caregiver verbalizes understanding and agreement with information reviewed,  availability and how to access available resources as needed. Patient verbalizes understanding and agreement with information reviewed, social work availability, and how to access available resources as needed.

## 2019-11-14 NOTE — PROCEDURES
Deborah Navas is a 49 y.o.  female patient, who presents for a 6 minute walk test ordered by Shar Walden MD.  The diagnosis is Congestive Heart Failure; LVAD.  The patient's BMI is 36.8 kg/m2.  Predicted distance (lower limit of normal) is 356.87 meters.      Test Results:    The test was completed with stops.  The patient stopped 1 time for a total of 24 seconds.  The total time walked was 336 seconds.  During walking, the patient reported:  Dyspnea; Leg heaviness.  The patient used no assistive devices during testing.     11/13/2019---------Distance: 182.88 meters (600 feet)     O2 Sat % Supplemental Oxygen Heart Rate Blood Pressure Lesley Scale   Pre-exercise  (Resting) 98 % Room Air 41 bpm Unable to obtain 4   During Exercise 92 % Room Air 41 bpm Unable to obtain 9   Post-exercise  (Recovery) 100 % Room Air  56 bpm       Recovery Time:  133 seconds    Performing nurse/tech:  LAI Dominguez CRT      PREVIOUS STUDY:   08/27/2019---------Distance: 320.04 meters (1050 feet)       O2 Sat % Supplemental Oxygen Heart Rate Blood Pressure Lesley Scale   Pre-exercise  (Resting) 98 % Room Air 74 bpm 106/71 mmHg 1   During Exercise   97 % Room Air 102 bpm 133/74 mmHg 7-8   Post-exercise  (Recovery) 99 % Room Air  93 bpm   mmHg        CLINICAL INTERPRETATION:  Six minute walk distance is 182.88 meters (600 feet) with very, very heavy dyspnea.  During exercise, there was significant desaturation while breathing room air.  Heart rate remained stable with walking.  Bradycardia was present prior to exercise.  The patient reported non-pulmonary symptoms during exercise.  Significant exercise impairment is likely due to cardiovascular causes and subjective symptoms.  The patient did complete the study, walking 336 seconds of the 360 second test.  Since the previous study in August 2019, exercise capacity is significantly worse.  Based upon age and body mass index, exercise capacity is less than predicted.

## 2019-11-14 NOTE — PROGRESS NOTES
Marjorie FRANK notified of paatient having a low flow alarm while sitting in bed. Patient asymptomatic. Will draw BMP early.       11/14/19 1530   Device   Type HeartMate3   Flow 2.4   Speed 5350   PI 3.3   Power (Orellana) 7.5   Equipment   Alarms Yes (see comment)  (low flow alarm at 1530)

## 2019-11-14 NOTE — PLAN OF CARE
Pt arriving the floor 1400 this afternoon, directly admitted due to low flow alarm on her LVAD the last few days. No fall, injuries, skin breakdown this shift. No complaint of pain nor discomfort. Admission note completed. Pt went down to an Echo this pm. Pt has no lab result yet, the next blood work draw is tomorrow morning by the lab. Pt is LVAD heart mate 3, BP was slightly elevated this pm, MD notified. Will continue to monitor.

## 2019-11-14 NOTE — SIGNIFICANT EVENT
Patient had 10 beat run of VTach on the monitor; asymptomatic. Patient potassium also came back at 3.1. Dr. Ordaz notified. MD ordered 60 meq PO STAT, 20 meq PO at 0830, and a repeat BMP for 0100. Will continue to monitor.

## 2019-11-14 NOTE — PLAN OF CARE
Plan of care reviewed with patient and he/she states understanding. No acute events noted at this time. Patient remains free from injury. VS stable. 1 low flow alarm noted this afternoon. Lasix IVP TID continued. All questions and concerns addressed. Will continue to monitor.

## 2019-11-14 NOTE — ASSESSMENT & PLAN NOTE
-hx low flow alarms prior to admit  -Possibly secondary to ADHF.  -Echo unremarkable.  Formal report of CT pending. (non contrast as creat was elevated above baseline on admit)  -Will monitor response to diuresis  -BP was elevated yesterday evening, but better today.  Will monitor

## 2019-11-14 NOTE — ASSESSMENT & PLAN NOTE
-HeartMate 3 Implanted 9/10/19 as DT.  -Implanted by Dr. Walden  -INR slightly therapeutic. Goal INR 2.0-3.0. Continue Coumadin.  Previous home dose was 3mg on Wed and 1.5mg QDaily  -Antiplatelets:  mg.  -LDH is stable Will monitor daily.   -Speed set at 5300;   -Not listed for OHTx: was declined due to pulmonary hypertension and incomplete care giving plan    Procedure: Device Interrogation Including analysis of device parameters  Current Settings: Ventricular Assist Device  Review of device function is stable    TXP LVAD INTERROGATIONS 11/14/2019 11/13/2019 11/13/2019 11/13/2019 10/30/2019 10/30/2019 10/30/2019   Type HeartMate3 HeartMate3 HeartMate3 - HeartMate3 HeartMate3 HeartMate3   Flow 4.1 3.5 4.0 - 3.7 3.8 4.2   Speed 5350 5300 5300 - 5300 5300 5300   PI 4.3 4.9 4.0 - 3.7 3.9 3.4   Power (Orellana) 3.8 3.7 3.7 - 3.7 3.6 3.7   [facility] 4900 - 4900 - 4900 4900 -   Pulsatility - - - No Pulse Intermittent pulse Intermittent pulse Intermittent pulse

## 2019-11-14 NOTE — SUBJECTIVE & OBJECTIVE
Interval History: 6 minutes walk prior to admit: decreased exercise capacity; however, patient was volume overloaded.  She diuresed with IVP Lasix and is net negative 5.2L  She reports feeling better but still feels a little extra fluid.  Weight is 223lbs down from 235 on admission.  Echo done yesterday: EF: 20%, LVEDD: 6.3.  BP was elevated yesterday afternoon but is better this morning.     Continuous Infusions:  Scheduled Meds:   amiodarone  400 mg Oral BID    aspirin  325 mg Oral Daily    furosemide  80 mg Intravenous TID    gabapentin  200 mg Oral BID    hydrALAZINE  25 mg Oral Q8H    lisinopril  10 mg Oral BID    magnesium oxide  400 mg Oral Daily    mexiletine  150 mg Oral Q8H    mirtazapine  15 mg Oral QHS    pantoprazole  40 mg Oral Daily    venlafaxine  150 mg Oral Daily    warfarin  1.5 mg Oral Daily     PRN Meds:ALPRAZolam, promethazine, senna-docusate 8.6-50 mg, zolpidem    Review of patient's allergies indicates:   Allergen Reactions    Adhesive Blisters     Reaction to area in chest and up only    Codeine Itching     Objective:     Vital Signs (Most Recent):  Temp: 97.8 °F (36.6 °C) (11/14/19 0757)  Pulse: 84 (11/14/19 0720)  Resp: 17 (11/14/19 0757)  BP: (!) 78/0 (11/14/19 0757)  SpO2: (!) 94 % (11/14/19 0757) Vital Signs (24h Range):  Temp:  [97.3 °F (36.3 °C)-98.5 °F (36.9 °C)] 97.8 °F (36.6 °C)  Pulse:  [] 84  Resp:  [16-18] 17  SpO2:  [92 %-99 %] 94 %  BP: ()/(0) 78/0     Patient Vitals for the past 72 hrs (Last 3 readings):   Weight   11/14/19 0500 101.5 kg (223 lb 12.3 oz)   11/13/19 1437 107 kg (235 lb 14.3 oz)     Body mass index is 35.05 kg/m².      Intake/Output Summary (Last 24 hours) at 11/14/2019 1008  Last data filed at 11/14/2019 0900  Gross per 24 hour   Intake 1138 ml   Output 6800 ml   Net -5662 ml       Hemodynamic Parameters:       Telemetry: NSVT  Physical Exam  Constitutional: laying in bed NAD  Neck: Normal range of motion. Neck supple. JVD elevation  to lower neck with patient sitting in bed  Cardiovascular: Normal rate, regular rhythm, Smooth VAD hum. No murmur heard.  Pulmonary/Chest: Effort normal. No stridor. No respiratory distress. She has decreased breath sounds in the left lower field. She has no wheezes. She has no rales.   Abdominal: Soft. Normal appearance, normal aorta and bowel sounds are normal. She exhibits no distension. There is no tenderness.   Musculoskeletal: Normal range of motion. She exhibits no edema or tenderness.   Neurological: She is alert and oriented to person, place, and time. She has normal strength and normal reflexes. Coordination normal.   Skin: Skin is warm and dry. No rash noted. No erythema.    Significant Labs:  CBC:  Recent Labs   Lab 11/13/19  1038 11/14/19  0407   WBC 5.79 3.84*   RBC 4.35 4.49   HGB 10.8* 11.2*   HCT 38.5 39.2    290   MCV 89 87   MCH 24.8* 24.9*   MCHC 28.1* 28.6*     BNP:  Recent Labs   Lab 11/13/19  1038   *     CMP:  Recent Labs   Lab 11/13/19  1038 11/14/19  0407   * 97   CALCIUM 9.6 9.7   ALBUMIN 3.6  --    PROT 7.2  --     145   K 3.9 3.1*   CO2 23 29    105   BUN 19 20   CREATININE 1.7* 1.6*   ALKPHOS 92  --    ALT 16  --    AST 20  --    BILITOT 0.6  --       Coagulation:   Recent Labs   Lab 11/11/19 11/13/19  1038 11/14/19  0407   INR 3.4 2.6* 2.3*   APTT  --   --  33.8*     LDH:  Recent Labs   Lab 11/13/19  1038 11/14/19  0407    227     Microbiology:  Microbiology Results (last 7 days)     ** No results found for the last 168 hours. **          I have reviewed all pertinent labs within the past 24 hours.    Estimated Creatinine Clearance: 51.5 mL/min (A) (based on SCr of 1.6 mg/dL (H)).    Diagnostic Results:  I have reviewed all pertinent imaging results/findings within the past 24 hours.

## 2019-11-14 NOTE — CARE UPDATE
Brief HTS Update Note    Called re elevated Dopplers 90s-100. Patient currently on lisiopril 10 bid. Will give IV hydral 5 x 1 and started PO hydral 25 tid. Will defer further uptitration to day team.    Abhijit Ordaz MD  PGY-4, Cardiology  Pager [phone]

## 2019-11-14 NOTE — PLAN OF CARE
Problem: Adult Inpatient Plan of Care  Goal: Plan of Care Review  LOC: alert and oriented x 4.   SKIN: The skin is warm, dry. DLES Dressing CDI.  RESPIRATORY: Respirations are WNL, even and unlabored. Normal effort and rate noted. No accessory muscle use noted. Patient on room air.  CARDIAC: Patient runs normal sinus rhythm / LVAD rhythm on the monitor.   ABDOMEN: Soft and non tender to palpation. No distention noted.   URINARY: continent; up to toilet.  EXTREMITIES: Extremities are WNL; general weakness throughout.  Neuro: Pt able to follow commands and is calm and cooperative with care.      POC reviewed with patient. Patient free of injuries and falls. Patient has no c/o pain or discomfort. Patient dopplers have been in the 90's; Dr. Ordaz notified. MD ordered 1 x dose of 5 mg hydralazine IV push. Other VSS. Patient went down for CT of abdomen / chest tonight. No low flow alarms over night. Patient diuresing with 80 mg lasix IV push TID. No labs drawn on this patient on admit; no orders until 11/14 AM. HCT not updated in LVAD monitor. All questions were addressed. Will continue to monitor.

## 2019-11-14 NOTE — ASSESSMENT & PLAN NOTE
-NICM  -Last 2D Echo 11/13/19: LVEF 20%, LVEDD 6.3 cm with speed at 5300  -Diuresed Will on IVP Lasix.  Will continue this am and consider transitioning to po tomorrow  -2g Na dietary restriction, 1500 mL fluid restriction, strict I/Os

## 2019-11-14 NOTE — PT/OT/SLP PROGRESS
Physical Therapy      Patient Name:  Deborah Navas   MRN:  [id number]    Patient not seen today secondary to (Pt declining therapy 2* nausea, but reports she is willing to ambulate at a later time. PT unable to return for later attempt this date.). Theraputty provided in order for pt to perform OT HEP throughout admission. Will follow-up at next scheduled session as able.    Alecia Pace, PT, DPT   11/14/2019  [phone]

## 2019-11-14 NOTE — ASSESSMENT & PLAN NOTE
-Per EP office visit on day of admission: She continues to have multiple VT episodes with some ATP therapy, although no ICD shocks since starting mexiletine 10/24/2019. One slow VT episode 2.5hrs 11/3/2019. Patient asymptomatic with LVAD. On coumadin. No AFL since post-op event (paced out of rhythm 9/24/2019). CHB with 100% V pacing (LV lead off).   -Plan was to continue current regimen despite Mexiletine making her nauseous.  - Will decrease Amiodarone to daily per EP plan on discharge last hospitalization

## 2019-11-14 NOTE — PROGRESS NOTES
[facility]  Heart Transplant  Progress Note    Patient Name: Deborah Navas  MRN: [id number]  Admission Date: 11/13/2019  Hospital Length of Stay: 0 days  Attending Physician: Vi Bryant MD  Primary Care Provider: Primary Doctor No  Principal Problem:<principal problem not specified>    Subjective:     Interval History: 6 minutes walk prior to admit: decreased exercise capacity; however, patient was volume overloaded.  She diuresed with IVP Lasix and is net negative 5.2L  She reports feeling better but still feels a little extra fluid.  Weight is 223lbs down from 235 on admission.  Echo done yesterday: EF: 20%, LVEDD: 6.3.  BP was elevated yesterday afternoon but is better this morning.     Continuous Infusions:  Scheduled Meds:   amiodarone  400 mg Oral BID    aspirin  325 mg Oral Daily    furosemide  80 mg Intravenous TID    gabapentin  200 mg Oral BID    hydrALAZINE  25 mg Oral Q8H    lisinopril  10 mg Oral BID    magnesium oxide  400 mg Oral Daily    mexiletine  150 mg Oral Q8H    mirtazapine  15 mg Oral QHS    pantoprazole  40 mg Oral Daily    venlafaxine  150 mg Oral Daily    warfarin  1.5 mg Oral Daily     PRN Meds:ALPRAZolam, promethazine, senna-docusate 8.6-50 mg, zolpidem    Review of patient's allergies indicates:   Allergen Reactions    Adhesive Blisters     Reaction to area in chest and up only    Codeine Itching     Objective:     Vital Signs (Most Recent):  Temp: 97.8 °F (36.6 °C) (11/14/19 0757)  Pulse: 84 (11/14/19 0720)  Resp: 17 (11/14/19 0757)  BP: (!) 78/0 (11/14/19 0757)  SpO2: (!) 94 % (11/14/19 0757) Vital Signs (24h Range):  Temp:  [97.3 °F (36.3 °C)-98.5 °F (36.9 °C)] 97.8 °F (36.6 °C)  Pulse:  [] 84  Resp:  [16-18] 17  SpO2:  [92 %-99 %] 94 %  BP: ()/(0) 78/0     Patient Vitals for the past 72 hrs (Last 3 readings):   Weight   11/14/19 0500 101.5 kg (223 lb 12.3 oz)   11/13/19 1437 107 kg (235 lb 14.3 oz)     Body mass index is  35.05 kg/m².      Intake/Output Summary (Last 24 hours) at 11/14/2019 1008  Last data filed at 11/14/2019 0900  Gross per 24 hour   Intake 1138 ml   Output 6800 ml   Net -5662 ml       Hemodynamic Parameters:       Telemetry: NSVT  Physical Exam  Constitutional: laying in bed NAD  Neck: Normal range of motion. Neck supple. JVD elevation to  lower neck with patient sitting in bed  Cardiovascular: Normal rate, regular rhythm, Smooth VAD hum. No murmur heard.  Pulmonary/Chest: Effort normal. No stridor. No respiratory distress. She has decreased breath sounds in the left lower field. She has no wheezes. She has no rales.   Abdominal: Soft. Normal appearance, normal aorta and bowel sounds are normal. She exhibits no distension. There is no tenderness.   Musculoskeletal: Normal range of motion. She exhibits no edema or tenderness.   Neurological: She is alert and oriented to person, place, and time. She has normal strength and normal reflexes. Coordination normal.   Skin: Skin is warm and dry. No rash noted. No erythema.    Significant Labs:  CBC:  Recent Labs   Lab 11/13/19  1038 11/14/19  0407   WBC 5.79 3.84*   RBC 4.35 4.49   HGB 10.8* 11.2*   HCT 38.5 39.2    290   MCV 89 87   MCH 24.8* 24.9*   MCHC 28.1* 28.6*     BNP:  Recent Labs   Lab 11/13/19  1038   *     CMP:  Recent Labs   Lab 11/13/19  1038 11/14/19  0407   * 97   CALCIUM 9.6 9.7   ALBUMIN 3.6  --    PROT 7.2  --     145   K 3.9 3.1*   CO2 23 29    105   BUN 19 20   CREATININE 1.7* 1.6*   ALKPHOS 92  --    ALT 16  --    AST 20  --    BILITOT 0.6  --       Coagulation:   Recent Labs   Lab 11/11/19 11/13/19  1038 11/14/19  0407   INR 3.4 2.6* 2.3*   APTT  --   --  33.8*     LDH:  Recent Labs   Lab 11/13/19  1038 11/14/19  0407    227     Microbiology:  Microbiology Results (last 7 days)     ** No results found for the last 168 hours. **          I have reviewed all pertinent labs within the past 24 hours.    Estimated  Creatinine Clearance: 51.5 mL/min (A) (based on SCr of 1.6 mg/dL (H)).    Diagnostic Results:  I have reviewed all pertinent imaging results/findings within the past 24 hours.    Assessment and Plan:     50 yo WF with NICM, s/p HM3 implantation 9/10/19 (post up coarse uncomplicated with the exception of+ diaphragmatic stimulation of LV lead and pleural effusion with chest tube placement, atrial flutter with NADINE/DCCV), V-fib reported in setting of hypokalemia with appropriate shock from ICD on Amiodarone prior to LVAD placement; and recent hospitalizations for ventricular arrythmias; started on Mexilitine.  She was seen in EP clinic today and she continues to have multiple VT episodes with some ATP therapy, although no ICD shocks since starting mexiletine 10/24/2019. One slow VT episode 2.5hrs 11/3/2019. Patient asymptomatic with LVAD. On coumadin. No AFL since post-op event (paced out of rhythm 9/24/2019). CHB with 100% V pacing (LV lead off). She does not feel well. Dry heaving with mexiletine. Taking phenergan PRN. 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   She was seen in HTS clinic and reported 4 low flow alarms the last 4 nights.  She reports they occur in her sleep and last for only a few seconds.  By the time she wakes up; they quickly stop.  She does report to possibly being little volume overloaded.  She hasn't noticed weight gain at home but thinks she may have little extra fluid.  She denies SOB, chest pain, palpitations, LEGER. In review of her records: she was 223lbs on 10/24/19 during previous hospitalization and is 235lbs now.  Her lasix had previously been decreased to prn.     Left ventricular assist device (LVAD) complication  -hx low flow alarms prior to admit  -Possibly secondary to ADHF.  -Echo unremarkable.  Formal report of CT pending. (non contrast as creat was elevated above baseline on admit)  -Will monitor response to diuresis  -BP was elevated yesterday  evening, but better today.  Will monitor      LVAD (left ventricular assist device) present  -HeartMate 3 Implanted 9/10/19 as DT.  -Implanted by Dr. Walden  -INR slightly therapeutic. Goal INR 2.0-3.0. Continue Coumadin.  Previous home dose was 3mg on Wed and 1.5mg QDaily  -Antiplatelets:  mg.  -LDH is stable Will monitor daily.   -Speed set at 5300;   -Not listed for OHTx: was declined due to pulmonary hypertension and incomplete care giving plan    Procedure: Device Interrogation Including analysis of device parameters  Current Settings: Ventricular Assist Device  Review of device function is stable    TXP LVAD INTERROGATIONS 11/14/2019 11/13/2019 11/13/2019 11/13/2019 10/30/2019 10/30/2019 10/30/2019   Type HeartMate3 HeartMate3 HeartMate3 - HeartMate3 HeartMate3 HeartMate3   Flow 4.1 3.5 4.0 - 3.7 3.8 4.2   Speed 5350 5300 5300 - 5300 5300 5300   PI 4.3 4.9 4.0 - 3.7 3.9 3.4   Power (Orellana) 3.8 3.7 3.7 - 3.7 3.6 3.7   LSL 4900 - 4900 - 4900 4900 -   Pulsatility - - - No Pulse Intermittent pulse Intermittent pulse Intermittent pulse       Ventricular tachycardia  -Per EP office visit on day of admission: She continues to have multiple VT episodes with some ATP therapy, although no ICD shocks since starting mexiletine 10/24/2019. One slow VT episode 2.5hrs 11/3/2019. Patient asymptomatic with LVAD. On coumadin. No AFL since post-op event (paced out of rhythm 9/24/2019). CHB with 100% V pacing (LV lead off).   -Plan was to continue current regimen despite Mexiletine making her nauseous.  - Will decrease Amiodarone to daily per EP plan on discharge last hospitalization     Acute on chronic combined systolic and diastolic heart failure  -NICM  -Last 2D Echo 11/13/19: LVEF 20%, LVEDD 6.3 cm with speed at 5300  -Diuresed Will on IVP Lasix.  Will continue this am and consider transitioning to po tomorrow  -2g Na dietary restriction, 1500 mL fluid restriction, strict I/Os      CKD (chronic kidney  disease)  -Baseline creat appears 1.3-1.6  -Will monitor with diuresis     Essential hypertension  -Patient is non-pulsatile  -Doppler goal 60-90 mmHg  -Antihypertensive medications include  Lisinopril,  -Will continue current regimen and adjust as needed    ICD (implantable cardioverter-defibrillator) in place  -Medtronic ICD  -See above VT        ZAC Kelly  Heart Transplant  [facility]

## 2019-11-14 NOTE — NURSING
Pt sitting up in bed, no family at bedside. VAD interrogation negative for LFA since admission. VAD parameters WNL. Pt states that she is feeling better since getting fluid off (12 lbs since admit). Pt did experience VT in the event of hypokalemia, no ICD shocks. Pt was seen by EP in clinic prior to admit, no med changes made.

## 2019-11-15 LAB
ALBUMIN SERPL BCP-MCNC: 3.4 G/DL (ref 3.5–5.2)
ALP SERPL-CCNC: 91 U/L (ref 55–135)
ALT SERPL W/O P-5'-P-CCNC: 12 U/L (ref 10–44)
ANION GAP SERPL CALC-SCNC: 13 MMOL/L (ref 8–16)
ANION GAP SERPL CALC-SCNC: 13 MMOL/L (ref 8–16)
APTT BLDCRRT: 34.1 SEC (ref 21–32)
AST SERPL-CCNC: 15 U/L (ref 10–40)
BASOPHILS # BLD AUTO: 0.04 K/UL (ref 0–0.2)
BASOPHILS NFR BLD: 0.9 % (ref 0–1.9)
BILIRUB DIRECT SERPL-MCNC: 0.3 MG/DL (ref 0.1–0.3)
BILIRUB SERPL-MCNC: 0.5 MG/DL (ref 0.1–1)
BNP SERPL-MCNC: 207 PG/ML (ref 0–99)
BUN SERPL-MCNC: 21 MG/DL (ref 6–20)
BUN SERPL-MCNC: 21 MG/DL (ref 6–20)
CALCIUM SERPL-MCNC: 9.4 MG/DL (ref 8.7–10.5)
CALCIUM SERPL-MCNC: 9.8 MG/DL (ref 8.7–10.5)
CHLORIDE SERPL-SCNC: 102 MMOL/L (ref 95–110)
CHLORIDE SERPL-SCNC: 103 MMOL/L (ref 95–110)
CO2 SERPL-SCNC: 25 MMOL/L (ref 23–29)
CO2 SERPL-SCNC: 25 MMOL/L (ref 23–29)
CREAT SERPL-MCNC: 1.7 MG/DL (ref 0.5–1.4)
CREAT SERPL-MCNC: 1.7 MG/DL (ref 0.5–1.4)
CRP SERPL-MCNC: 15.6 MG/L (ref 0–8.2)
DIFFERENTIAL METHOD: ABNORMAL
EOSINOPHIL # BLD AUTO: 0.2 K/UL (ref 0–0.5)
EOSINOPHIL NFR BLD: 4 % (ref 0–8)
ERYTHROCYTE [DISTWIDTH] IN BLOOD BY AUTOMATED COUNT: 17 % (ref 11.5–14.5)
EST. GFR  (AFRICAN AMERICAN): 40 ML/MIN/1.73 M^2
EST. GFR  (AFRICAN AMERICAN): 40 ML/MIN/1.73 M^2
EST. GFR  (NON AFRICAN AMERICAN): 34.7 ML/MIN/1.73 M^2
EST. GFR  (NON AFRICAN AMERICAN): 34.7 ML/MIN/1.73 M^2
GLUCOSE SERPL-MCNC: 127 MG/DL (ref 70–110)
GLUCOSE SERPL-MCNC: 96 MG/DL (ref 70–110)
HCT VFR BLD AUTO: 38 % (ref 37–48.5)
HGB BLD-MCNC: 10.9 G/DL (ref 12–16)
IMM GRANULOCYTES # BLD AUTO: 0.01 K/UL (ref 0–0.04)
IMM GRANULOCYTES NFR BLD AUTO: 0.2 % (ref 0–0.5)
INR PPP: 2.4 (ref 0.8–1.2)
LDH SERPL L TO P-CCNC: 215 U/L (ref 110–260)
LYMPHOCYTES # BLD AUTO: 1.1 K/UL (ref 1–4.8)
LYMPHOCYTES NFR BLD: 23.2 % (ref 18–48)
MAGNESIUM SERPL-MCNC: 1.9 MG/DL (ref 1.6–2.6)
MCH RBC QN AUTO: 24.7 PG (ref 27–31)
MCHC RBC AUTO-ENTMCNC: 28.7 G/DL (ref 32–36)
MCV RBC AUTO: 86 FL (ref 82–98)
MONOCYTES # BLD AUTO: 0.3 K/UL (ref 0.3–1)
MONOCYTES NFR BLD: 7.1 % (ref 4–15)
NEUTROPHILS # BLD AUTO: 2.9 K/UL (ref 1.8–7.7)
NEUTROPHILS NFR BLD: 64.6 % (ref 38–73)
NRBC BLD-RTO: 0 /100 WBC
PHOSPHATE SERPL-MCNC: 3.4 MG/DL (ref 2.7–4.5)
PLATELET # BLD AUTO: 320 K/UL (ref 150–350)
PMV BLD AUTO: 9.5 FL (ref 9.2–12.9)
POTASSIUM SERPL-SCNC: 3.4 MMOL/L (ref 3.5–5.1)
POTASSIUM SERPL-SCNC: 3.6 MMOL/L (ref 3.5–5.1)
PREALB SERPL-MCNC: 21 MG/DL (ref 20–43)
PROT SERPL-MCNC: 7.2 G/DL (ref 6–8.4)
PROTHROMBIN TIME: 23.2 SEC (ref 9–12.5)
RBC # BLD AUTO: 4.41 M/UL (ref 4–5.4)
SODIUM SERPL-SCNC: 140 MMOL/L (ref 136–145)
SODIUM SERPL-SCNC: 141 MMOL/L (ref 136–145)
WBC # BLD AUTO: 4.52 K/UL (ref 3.9–12.7)

## 2019-11-15 PROCEDURE — 85025 COMPLETE CBC W/AUTO DIFF WBC: CPT | Mod: NTX

## 2019-11-15 PROCEDURE — 25000003 PHARM REV CODE 250: Mod: NTX | Performed by: PHYSICIAN ASSISTANT

## 2019-11-15 PROCEDURE — 84100 ASSAY OF PHOSPHORUS: CPT | Mod: NTX

## 2019-11-15 PROCEDURE — 83615 LACTATE (LD) (LDH) ENZYME: CPT | Mod: NTX

## 2019-11-15 PROCEDURE — 83880 ASSAY OF NATRIURETIC PEPTIDE: CPT | Mod: NTX

## 2019-11-15 PROCEDURE — 80048 BASIC METABOLIC PNL TOTAL CA: CPT | Mod: 91,NTX

## 2019-11-15 PROCEDURE — 93750 PR INTERROGATE VENT ASSIST DEV, IN PERSON, W PHYSICIAN ANALYSIS: ICD-10-PCS | Mod: NTX,,, | Performed by: INTERNAL MEDICINE

## 2019-11-15 PROCEDURE — 99233 SBSQ HOSP IP/OBS HIGH 50: CPT | Mod: 25,NTX,, | Performed by: INTERNAL MEDICINE

## 2019-11-15 PROCEDURE — 85730 THROMBOPLASTIN TIME PARTIAL: CPT | Mod: NTX

## 2019-11-15 PROCEDURE — 86140 C-REACTIVE PROTEIN: CPT | Mod: NTX

## 2019-11-15 PROCEDURE — 63600175 PHARM REV CODE 636 W HCPCS: Mod: NTX | Performed by: PHYSICIAN ASSISTANT

## 2019-11-15 PROCEDURE — 85610 PROTHROMBIN TIME: CPT | Mod: NTX

## 2019-11-15 PROCEDURE — 84134 ASSAY OF PREALBUMIN: CPT | Mod: NTX

## 2019-11-15 PROCEDURE — 27000248 HC VAD-ADDITIONAL DAY: Mod: NTX

## 2019-11-15 PROCEDURE — 83735 ASSAY OF MAGNESIUM: CPT | Mod: NTX

## 2019-11-15 PROCEDURE — 80048 BASIC METABOLIC PNL TOTAL CA: CPT | Mod: NTX

## 2019-11-15 PROCEDURE — 36415 COLL VENOUS BLD VENIPUNCTURE: CPT | Mod: NTX

## 2019-11-15 PROCEDURE — 99233 PR SUBSEQUENT HOSPITAL CARE,LEVL III: ICD-10-PCS | Mod: 25,NTX,, | Performed by: INTERNAL MEDICINE

## 2019-11-15 PROCEDURE — 93750 INTERROGATION VAD IN PERSON: CPT | Mod: NTX,,, | Performed by: INTERNAL MEDICINE

## 2019-11-15 PROCEDURE — 25000003 PHARM REV CODE 250: Mod: NTX | Performed by: INTERNAL MEDICINE

## 2019-11-15 PROCEDURE — 80076 HEPATIC FUNCTION PANEL: CPT | Mod: NTX

## 2019-11-15 PROCEDURE — 20600001 HC STEP DOWN PRIVATE ROOM: Mod: NTX

## 2019-11-15 PROCEDURE — 25000003 PHARM REV CODE 250: Mod: NTX | Performed by: STUDENT IN AN ORGANIZED HEALTH CARE EDUCATION/TRAINING PROGRAM

## 2019-11-15 RX ORDER — FUROSEMIDE 10 MG/ML
80 INJECTION INTRAMUSCULAR; INTRAVENOUS 3 TIMES DAILY
Status: DISCONTINUED | OUTPATIENT
Start: 2019-11-15 | End: 2019-11-16

## 2019-11-15 RX ORDER — AMLODIPINE BESYLATE 5 MG/1
5 TABLET ORAL DAILY
Status: DISCONTINUED | OUTPATIENT
Start: 2019-11-15 | End: 2019-11-16

## 2019-11-15 RX ORDER — POTASSIUM CHLORIDE 20 MEQ/1
60 TABLET, EXTENDED RELEASE ORAL ONCE
Status: COMPLETED | OUTPATIENT
Start: 2019-11-15 | End: 2019-11-15

## 2019-11-15 RX ORDER — POTASSIUM CHLORIDE 20 MEQ/1
60 TABLET, EXTENDED RELEASE ORAL ONCE
Status: DISCONTINUED | OUTPATIENT
Start: 2019-11-15 | End: 2019-11-15

## 2019-11-15 RX ADMIN — GABAPENTIN 200 MG: 100 CAPSULE ORAL at 09:11

## 2019-11-15 RX ADMIN — ASPIRIN 325 MG: 325 TABLET, DELAYED RELEASE ORAL at 10:11

## 2019-11-15 RX ADMIN — MEXILETINE HYDROCHLORIDE 150 MG: 150 CAPSULE ORAL at 09:11

## 2019-11-15 RX ADMIN — PROMETHAZINE HYDROCHLORIDE 12.5 MG: 12.5 TABLET ORAL at 10:11

## 2019-11-15 RX ADMIN — MEXILETINE HYDROCHLORIDE 150 MG: 150 CAPSULE ORAL at 02:11

## 2019-11-15 RX ADMIN — AMLODIPINE BESYLATE 5 MG: 5 TABLET ORAL at 11:11

## 2019-11-15 RX ADMIN — LISINOPRIL 10 MG: 10 TABLET ORAL at 09:11

## 2019-11-15 RX ADMIN — POTASSIUM CHLORIDE 60 MEQ: 1500 TABLET, EXTENDED RELEASE ORAL at 11:11

## 2019-11-15 RX ADMIN — VENLAFAXINE 150 MG: 37.5 TABLET ORAL at 10:11

## 2019-11-15 RX ADMIN — MEXILETINE HYDROCHLORIDE 150 MG: 150 CAPSULE ORAL at 05:11

## 2019-11-15 RX ADMIN — LISINOPRIL 10 MG: 10 TABLET ORAL at 10:11

## 2019-11-15 RX ADMIN — FUROSEMIDE 80 MG: 10 INJECTION, SOLUTION INTRAMUSCULAR; INTRAVENOUS at 09:11

## 2019-11-15 RX ADMIN — FUROSEMIDE 80 MG: 10 INJECTION, SOLUTION INTRAMUSCULAR; INTRAVENOUS at 10:11

## 2019-11-15 RX ADMIN — WARFARIN SODIUM 1.5 MG: 1 TABLET ORAL at 06:11

## 2019-11-15 RX ADMIN — GABAPENTIN 200 MG: 100 CAPSULE ORAL at 10:11

## 2019-11-15 RX ADMIN — POTASSIUM CHLORIDE 60 MEQ: 1500 TABLET, EXTENDED RELEASE ORAL at 02:11

## 2019-11-15 RX ADMIN — HYDRALAZINE HYDROCHLORIDE 25 MG: 25 TABLET ORAL at 05:11

## 2019-11-15 RX ADMIN — PROMETHAZINE HYDROCHLORIDE 12.5 MG: 12.5 TABLET ORAL at 02:11

## 2019-11-15 RX ADMIN — MAGNESIUM OXIDE TAB 400 MG (241.3 MG ELEMENTAL MG) 400 MG: 400 (241.3 MG) TAB at 10:11

## 2019-11-15 RX ADMIN — SPIRONOLACTONE 12.5 MG: 25 TABLET, FILM COATED ORAL at 02:11

## 2019-11-15 RX ADMIN — AMIODARONE HYDROCHLORIDE 400 MG: 200 TABLET ORAL at 10:11

## 2019-11-15 RX ADMIN — SENNOSIDES AND DOCUSATE SODIUM 2 TABLET: 8.6; 5 TABLET ORAL at 09:11

## 2019-11-15 RX ADMIN — PANTOPRAZOLE SODIUM 40 MG: 40 TABLET, DELAYED RELEASE ORAL at 10:11

## 2019-11-15 RX ADMIN — HYDRALAZINE HYDROCHLORIDE 25 MG: 25 TABLET ORAL at 09:11

## 2019-11-15 RX ADMIN — ZOLPIDEM TARTRATE 5 MG: 5 TABLET ORAL at 11:11

## 2019-11-15 RX ADMIN — HYDRALAZINE HYDROCHLORIDE 25 MG: 25 TABLET ORAL at 02:11

## 2019-11-15 RX ADMIN — MIRTAZAPINE 15 MG: 15 TABLET, FILM COATED ORAL at 09:11

## 2019-11-15 RX ADMIN — ALPRAZOLAM 0.25 MG: 0.25 TABLET ORAL at 07:11

## 2019-11-15 NOTE — PROGRESS NOTES
Low flow 2.4 and  2.6 noted to last 1-2 seconds. Dop 82/0. Asymptomatic. Was turning over in be when it happened. MD notified. Will continue to monitor.

## 2019-11-15 NOTE — NURSING
Results for SHELL VILLARREAL (MRN [id number]) as of 11/15/2019 14:06   Ref. Range 11/15/2019 12:38   Potassium Latest Ref Range: 3.5 - 5.1 mmol/L 3.4 (L)     Notified ZAC Turner of pt's drop in Potassium. Ordered 60 mEq PO x1, redraw tonight at 2000. Will implement. Will continue to monitor.

## 2019-11-15 NOTE — SUBJECTIVE & OBJECTIVE
Interval History: Changed Lasix to po yesterday afternoon.  She had low flow alarms yesterday afternoon and early this morning.  She is net negative 2.1L in the last 24 hours and still appears volume overloaded.  Hydralazine was started on admit for hypertension and BP has been better.      Continuous Infusions:  Scheduled Meds:   amiodarone  400 mg Oral Daily    amLODIPine  5 mg Oral Daily    aspirin  325 mg Oral Daily    furosemide  80 mg Intravenous TID    gabapentin  200 mg Oral BID    hydrALAZINE  25 mg Oral Q8H    lisinopril  10 mg Oral BID    magnesium oxide  400 mg Oral Daily    mexiletine  150 mg Oral Q8H    mirtazapine  15 mg Oral QHS    pantoprazole  40 mg Oral Daily    potassium chloride  60 mEq Oral Once    spironolactone  12.5 mg Oral Daily    venlafaxine  150 mg Oral Daily    warfarin  1.5 mg Oral Daily     PRN Meds:ALPRAZolam, pneumoc 13-amber conj-dip cr(PF), promethazine, senna-docusate 8.6-50 mg, zolpidem    Review of patient's allergies indicates:   Allergen Reactions    Adhesive Blisters     Reaction to area in chest and up only    Codeine Itching     Objective:     Vital Signs (Most Recent):  Temp: 98.5 °F (36.9 °C) (11/15/19 0434)  Pulse: 94 (11/15/19 0600)  Resp: 17 (11/15/19 0434)  BP: (!) 82/0 (11/15/19 0518)  SpO2: 96 % (11/15/19 0518) Vital Signs (24h Range):  Temp:  [97.2 °F (36.2 °C)-98.5 °F (36.9 °C)] 98.5 °F (36.9 °C)  Pulse:  [] 94  Resp:  [17-18] 17  SpO2:  [91 %-98 %] 96 %  BP: (64-82)/(0) 82/0     Patient Vitals for the past 72 hrs (Last 3 readings):   Weight   11/15/19 0500 101 kg (222 lb 10.6 oz)   11/14/19 0500 101.5 kg (223 lb 12.3 oz)   11/13/19 1437 107 kg (235 lb 14.3 oz)     Body mass index is 34.87 kg/m².      Intake/Output Summary (Last 24 hours) at 11/15/2019 1037  Last data filed at 11/15/2019 0900  Gross per 24 hour   Intake 1430 ml   Output 2500 ml   Net -1070 ml       Hemodynamic Parameters:       Telemetry: NSVT  Physical  Exam    Constitutional: laying in bed NAD  Neck: Normal range of motion. Neck supple. JVD elevation to mid neck with patient sitting in bed  Cardiovascular: Normal rate, regular rhythm, Smooth VAD hum. No murmur heard.  Pulmonary/Chest: Effort normal. No stridor. No respiratory distress. She has decreased breath sounds in the left lower field. She has no wheezes. She has no rales.   Abdominal: Soft. Normal appearance, normal aorta and bowel sounds are normal. She exhibits no distension. There is no tenderness.   Musculoskeletal: Normal range of motion. She exhibits no edema or tenderness.   Neurological: She is alert and oriented to person, place, and time. She has normal strength and normal reflexes. Coordination normal.   Skin: Skin is warm and dry. No rash noted. No erythema.    Significant Labs:  CBC:  Recent Labs   Lab 11/13/19  1038 11/14/19  0407 11/15/19  0428   WBC 5.79 3.84* 4.52   RBC 4.35 4.49 4.41   HGB 10.8* 11.2* 10.9*   HCT 38.5 39.2 38.0    290 320   MCV 89 87 86   MCH 24.8* 24.9* 24.7*   MCHC 28.1* 28.6* 28.7*     BNP:  Recent Labs   Lab 11/13/19  1038 11/15/19  0428   * 207*     CMP:  Recent Labs   Lab 11/13/19  1038 11/14/19  0407 11/14/19  1308 11/15/19  0428   * 97 126* 96   CALCIUM 9.6 9.7 9.6 9.4   ALBUMIN 3.6  --   --  3.4*   PROT 7.2  --   --  7.2    145 140 140   K 3.9 3.1* 3.8 3.6   CO2 23 29 25 25    105 104 102   BUN 19 20 19 21*   CREATININE 1.7* 1.6* 1.6* 1.7*   ALKPHOS 92  --   --  91   ALT 16  --   --  12   AST 20  --   --  15   BILITOT 0.6  --   --  0.5      Coagulation:   Recent Labs   Lab 11/13/19  1038 11/14/19  0407 11/15/19  0428   INR 2.6* 2.3* 2.4*   APTT  --  33.8* 34.1*     LDH:  Recent Labs   Lab 11/13/19  1038 11/14/19  0407 11/15/19  0428    227 215     Microbiology:  Microbiology Results (last 7 days)     ** No results found for the last 168 hours. **          I have reviewed all pertinent labs within the past 24  hours.    Estimated Creatinine Clearance: 48.4 mL/min (A) (based on SCr of 1.7 mg/dL (H)).    Diagnostic Results:  I have reviewed all pertinent imaging results/findings within the past 24 hours.

## 2019-11-15 NOTE — ASSESSMENT & PLAN NOTE
-Patient is non-pulsatile  -Doppler goal 60-90 mmHg  -Antihypertensive medications include  Lisinopril,  -Hydralazine was started on evening of admit, but will change to low dose Norvasc and Spironolactone to aid with BP and low K+.  Hesitated on increasing Lisinopril because of active diuresis and concern for fluctuation of creatinine

## 2019-11-15 NOTE — PROGRESS NOTES
[facility]  Heart Transplant  Progress Note    Patient Name: Deborah Navas  MRN: [id number]  Admission Date: 11/13/2019  Hospital Length of Stay: 0 days  Attending Physician: Vi Bryant MD  Primary Care Provider: Primary Doctor No  Principal Problem:<principal problem not specified>    Subjective:     Interval History: Changed Lasix to po yesterday afternoon.  She had low flow alarms yesterday afternoon and early this morning.  She is net negative 2.1L in the last 24 hours and still appears volume overloaded.  Hydralazine was started on admit for hypertension and BP has been better.      Continuous Infusions:  Scheduled Meds:   amiodarone  400 mg Oral Daily    amLODIPine  5 mg Oral Daily    aspirin  325 mg Oral Daily    furosemide  80 mg Intravenous TID    gabapentin  200 mg Oral BID    hydrALAZINE  25 mg Oral Q8H    lisinopril  10 mg Oral BID    magnesium oxide  400 mg Oral Daily    mexiletine  150 mg Oral Q8H    mirtazapine  15 mg Oral QHS    pantoprazole  40 mg Oral Daily    potassium chloride  60 mEq Oral Once    spironolactone  12.5 mg Oral Daily    venlafaxine  150 mg Oral Daily    warfarin  1.5 mg Oral Daily     PRN Meds:ALPRAZolam, pneumoc 13-amber conj-dip cr(PF), promethazine, senna-docusate 8.6-50 mg, zolpidem    Review of patient's allergies indicates:   Allergen Reactions    Adhesive Blisters     Reaction to area in chest and up only    Codeine Itching     Objective:     Vital Signs (Most Recent):  Temp: 98.5 °F (36.9 °C) (11/15/19 0434)  Pulse: 94 (11/15/19 0600)  Resp: 17 (11/15/19 0434)  BP: (!) 82/0 (11/15/19 0518)  SpO2: 96 % (11/15/19 0518) Vital Signs (24h Range):  Temp:  [97.2 °F (36.2 °C)-98.5 °F (36.9 °C)] 98.5 °F (36.9 °C)  Pulse:  [] 94  Resp:  [17-18] 17  SpO2:  [91 %-98 %] 96 %  BP: (64-82)/(0) 82/0     Patient Vitals for the past 72 hrs (Last 3 readings):   Weight   11/15/19 0500 101 kg (222 lb 10.6 oz)   11/14/19 0500 101.5 kg (223  lb 12.3 oz)   11/13/19 1437 107 kg (235 lb 14.3 oz)     Body mass index is 34.87 kg/m².      Intake/Output Summary (Last 24 hours) at 11/15/2019 1037  Last data filed at 11/15/2019 0900  Gross per 24 hour   Intake 1430 ml   Output 2500 ml   Net -1070 ml       Hemodynamic Parameters:       Telemetry: NSVT  Physical Exam    Constitutional: laying in bed NAD  Neck: Normal range of motion. Neck supple. JVD elevation to  mid neck with patient sitting in bed  Cardiovascular: Normal rate, regular rhythm, Smooth VAD hum. No murmur heard.  Pulmonary/Chest: Effort normal. No stridor. No respiratory distress. She has decreased breath sounds in the left lower field. She has no wheezes. She has no rales.   Abdominal: Soft. Normal appearance, normal aorta and bowel sounds are normal. She exhibits no distension. There is no tenderness.   Musculoskeletal: Normal range of motion. She exhibits no edema or tenderness.   Neurological: She is alert and oriented to person, place, and time. She has normal strength and normal reflexes. Coordination normal.   Skin: Skin is warm and dry. No rash noted. No erythema.    Significant Labs:  CBC:  Recent Labs   Lab 11/13/19  1038 11/14/19  0407 11/15/19  0428   WBC 5.79 3.84* 4.52   RBC 4.35 4.49 4.41   HGB 10.8* 11.2* 10.9*   HCT 38.5 39.2 38.0    290 320   MCV 89 87 86   MCH 24.8* 24.9* 24.7*   MCHC 28.1* 28.6* 28.7*     BNP:  Recent Labs   Lab 11/13/19  1038 11/15/19  0428   * 207*     CMP:  Recent Labs   Lab 11/13/19  1038 11/14/19  0407 11/14/19  1308 11/15/19  0428   * 97 126* 96   CALCIUM 9.6 9.7 9.6 9.4   ALBUMIN 3.6  --   --  3.4*   PROT 7.2  --   --  7.2    145 140 140   K 3.9 3.1* 3.8 3.6   CO2 23 29 25 25    105 104 102   BUN 19 20 19 21*   CREATININE 1.7* 1.6* 1.6* 1.7*   ALKPHOS 92  --   --  91   ALT 16  --   --  12   AST 20  --   --  15   BILITOT 0.6  --   --  0.5      Coagulation:   Recent Labs   Lab 11/13/19  1038 11/14/19  0407 11/15/19  0428    INR 2.6* 2.3* 2.4*   APTT  --  33.8* 34.1*     LDH:  Recent Labs   Lab 11/13/19  1038 11/14/19  0407 11/15/19  0428    227 215     Microbiology:  Microbiology Results (last 7 days)     ** No results found for the last 168 hours. **          I have reviewed all pertinent labs within the past 24 hours.    Estimated Creatinine Clearance: 48.4 mL/min (A) (based on SCr of 1.7 mg/dL (H)).    Diagnostic Results:  I have reviewed all pertinent imaging results/findings within the past 24 hours.    Assessment and Plan:     50 yo WF with NICM, s/p HM3 implantation 9/10/19 (post up coarse uncomplicated with the exception of+ diaphragmatic stimulation of LV lead and pleural effusion with chest tube placement, atrial flutter with NADINE/DCCV), V-fib reported in setting of hypokalemia with appropriate shock from ICD on Amiodarone prior to LVAD placement; and recent hospitalizations for ventricular arrythmias; started on Mexilitine.  She was seen in EP clinic today and she continues to have multiple VT episodes with some ATP therapy, although no ICD shocks since starting mexiletine 10/24/2019. One slow VT episode 2.5hrs 11/3/2019. Patient asymptomatic with LVAD. On coumadin. No AFL since post-op event (paced out of rhythm 9/24/2019). CHB with 100% V pacing (LV lead off). She does not feel well. Dry heaving with mexiletine. Taking phenergan PRN. 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   She was seen in HTS clinic and reported 4 low flow alarms the last 4 nights.  She reports they occur in her sleep and last for only a few seconds.  By the time she wakes up; they quickly stop.  She does report to possibly being little volume overloaded.  She hasn't noticed weight gain at home but thinks she may have little extra fluid.  She denies SOB, chest pain, palpitations, LEGER. In review of her records: she was 223lbs on 10/24/19 during previous hospitalization and is 235lbs now.  Her lasix had  previously been decreased to prn.     Left ventricular assist device (LVAD) complication  -hx low flow alarms prior to admit and again the last 24 hours  -Possibly secondary to ADHF.  -Echo unremarkable.  Formal report of CT unremarkable; however, it was a non contrast as creat was elevated above baseline on admit and inflow and outflow cannulars not accurately visualized   -Will monitor response to diuresis  -BP was elevated on evening of admit, but have been controlled with each low flow alarm      LVAD (left ventricular assist device) present  -HeartMate 3 Implanted 9/10/19 as DT.  -Implanted by Dr. Walden  -INR slightly therapeutic. Goal INR 2.0-3.0. Continue Coumadin.  Previous home dose was 3mg on Wed and 1.5mg QDaily  -Antiplatelets:  mg.  -LDH is stable Will monitor daily.   -Speed set at 5300;   -Not listed for OHTx: was declined due to pulmonary hypertension and incomplete care giving plan    Procedure: Device Interrogation Including analysis of device parameters  Current Settings: Ventricular Assist Device  Review of device function is stable    TXP LVAD INTERROGATIONS 11/15/2019 11/14/2019 11/14/2019 11/14/2019 11/14/2019 11/14/2019 11/14/2019   Type HeartMate3 HeartMate3 HeartMate3 HeartMate3 HeartMate3 HeartMate3 HeartMate3   Flow 3.1 3.4 3.8 2.7 2.4 3.0 4.1   Speed 5300 5300 5300 5300 5350 5050 5350   PI 3.1 4.2 4.2 6.6 3.3 5.3 4.3   Power (Orellana) 3.7 3.7 3.7 3.5 7.5 3.4 3.8   LSL 4900 4900 4900 - - - 4900   Pulsatility - - - - - - -       Ventricular tachycardia  -Per EP office visit on day of admission: She continues to have multiple VT episodes with some ATP therapy, although no ICD shocks since starting mexiletine 10/24/2019. One slow VT episode 2.5hrs 11/3/2019. Patient asymptomatic with LVAD. On coumadin. No AFL since post-op event (paced out of rhythm 9/24/2019). CHB with 100% V pacing (LV lead off).   -Plan was to continue current regimen despite Mexiletine making her  nauseous.  -Decreased Amiodarone to daily per EP plan on discharge last hospitalization     Acute on chronic combined systolic and diastolic heart failure  -NICM  -Last 2D Echo 11/13/19: LVEF 20%, LVEDD 6.3 cm with speed at 5300  -Diuresed Will on IVP Lasix and was planning on transitioning to po this am, but she still looks volume overloaded so will continue IV Lasix.   -2g Na dietary restriction, 1500 mL fluid restriction, strict I/Os      CKD (chronic kidney disease)  -Baseline creat appears 1.3-1.6  -Will monitor with diuresis     Essential hypertension  -Patient is non-pulsatile  -Doppler goal 60-90 mmHg  -Antihypertensive medications include  Lisinopril,  -Hydralazine was started on evening of admit, but will change to low dose Norvasc and Spironolactone to aid with BP and low K+.  Hesitated on increasing Lisinopril because of active diuresis and concern for fluctuation of creatinine     ICD (implantable cardioverter-defibrillator) in place  -Medtronic ICD  -See above VT        ZAC Kelly  Heart Transplant  [facility]

## 2019-11-15 NOTE — PLAN OF CARE
Pt free of falls/trauma/injuries.  Denies c/o SOB, CP, or discomfort.  Generalized skin remains CDI; generalized edema noted.  LVAD working properly this shift without any complications.  LVAD dressing to be changed daily with soap/NS; dressing remains CDI.   Pt being diuresed with lasix PO; diuresing well. Transitioned to PO lasix today.  Wt remains stable. Echo completed 11/13; EF 20%. No low flows noted during shift. Pt did complain of N/V; phenergan PO given. Poss D/C home. VSS. Fall bundle in place. POC explained, no questions at this time. Pt tolerating plan of care.

## 2019-11-15 NOTE — ASSESSMENT & PLAN NOTE
-HeartMate 3 Implanted 9/10/19 as DT.  -Implanted by Dr. Walden  -INR slightly therapeutic. Goal INR 2.0-3.0. Continue Coumadin.  Previous home dose was 3mg on Wed and 1.5mg QDaily  -Antiplatelets:  mg.  -LDH is stable Will monitor daily.   -Speed set at 5300;   -Not listed for OHTx: was declined due to pulmonary hypertension and incomplete care giving plan    Procedure: Device Interrogation Including analysis of device parameters  Current Settings: Ventricular Assist Device  Review of device function is stable    TXP LVAD INTERROGATIONS 11/15/2019 11/14/2019 11/14/2019 11/14/2019 11/14/2019 11/14/2019 11/14/2019   Type HeartMate3 HeartMate3 HeartMate3 HeartMate3 HeartMate3 HeartMate3 HeartMate3   Flow 3.1 3.4 3.8 2.7 2.4 3.0 4.1   Speed 5300 5300 5300 5300 5350 5050 5350   PI 3.1 4.2 4.2 6.6 3.3 5.3 4.3   Power (Orellana) 3.7 3.7 3.7 3.5 7.5 3.4 3.8   LSL 4900 4900 4900 - - - 4900   Pulsatility - - - - - - -

## 2019-11-15 NOTE — PROGRESS NOTES
11/15/2019  Joaquin Cobb    Current provider:  Vi Bryant MD      I, Joaquin Cobb, rounded on Deborah Navas to ensure all mechanical assist device settings (IABP or VAD) were appropriate and all parameters were within limits.  I was able to ensure all back up equipment was present, the staff had no issues, and the Perfusion Department daily rounding was complete.    12:50 PM

## 2019-11-15 NOTE — ASSESSMENT & PLAN NOTE
-hx low flow alarms prior to admit and again the last 24 hours  -Possibly secondary to ADHF.  -Echo unremarkable.  Formal report of CT unremarkable; however, it was a non contrast as creat was elevated above baseline on admit and inflow and outflow cannulars not accurately visualized   -Will monitor response to diuresis  -BP was elevated on evening of admit, but have been controlled with each low flow alarm

## 2019-11-15 NOTE — ASSESSMENT & PLAN NOTE
-Per EP office visit on day of admission: She continues to have multiple VT episodes with some ATP therapy, although no ICD shocks since starting mexiletine 10/24/2019. One slow VT episode 2.5hrs 11/3/2019. Patient asymptomatic with LVAD. On coumadin. No AFL since post-op event (paced out of rhythm 9/24/2019). CHB with 100% V pacing (LV lead off).   -Plan was to continue current regimen despite Mexiletine making her nauseous.  -Decreased Amiodarone to daily per EP plan on discharge last hospitalization

## 2019-11-15 NOTE — NURSING
"Pt reported that she "felt a jolt" in her left side, no shock felt but stated she always "felt the jolt right before my ICD shocks me". VT noted on tele, but no rhythm changed noted on monitor. K replaement just given. VS shown in Epic. No events noted on LVAD history, but pt reports speed dropped to 4950 at the time she felt the jolt. ZAC Turner notified; ordered device interrogation. Will follow up. Will continue to monitor.  "

## 2019-11-15 NOTE — ASSESSMENT & PLAN NOTE
-NICM  -Last 2D Echo 11/13/19: LVEF 20%, LVEDD 6.3 cm with speed at 5300  -Diuresed Will on IVP Lasix and was planning on transitioning to po this am, but she still looks volume overloaded so will continue IV Lasix.   -2g Na dietary restriction, 1500 mL fluid restriction, strict I/Os

## 2019-11-15 NOTE — CARE UPDATE
Brief HTS Update Note    Notified by RN re low flow alarm (pump flow 2.4, power 3.4, index 6.7). No events noted on tele review. Doppler MAP 82/0. Patient Asymptomatic, appears euvolemic, extremities WWP x 4 on exam. INR at goal 11/14, repeat pending. No further actions for now, will continue to monitor closely.    Abhijit Ordaz MD  PGY-4, Cardiology  Pager [phone]

## 2019-11-15 NOTE — PT/OT/SLP PROGRESS
Physical Therapy      Patient Name:  Deborah Navas   MRN:  [id number]    Patient not seen today secondary to (Hold at time of attempt 2* v-tach; pt also with low flow alarms early AM). Will follow-up at next scheduled session as able.    Alecia Pace, PT, DPT   11/15/2019  [phone]

## 2019-11-15 NOTE — PLAN OF CARE
[facility]   Heart Transplant/VAD Clinic   [address] [place] [address]   [phone] after hours          [phone] fax     VAD HOME  HEALTH ORDERS      Admit to Home Health    Diagnosis:   Patient Active Problem List   Diagnosis    Knee pain    Pes anserine bursitis    ICD (implantable cardioverter-defibrillator) in place    Ventricular fibrillation    Acute on chronic combined systolic and diastolic heart failure    Congestive cardiomyopathy    History of ventricular fibrillation    History of ventricular tachycardia    Heart transplant candidate    Enlarged thyroid    Abnormal CT scan    CKD (chronic kidney disease)    Chronic systolic congestive heart failure    LVAD (left ventricular assist device) present    Pleural effusion    Anticoagulation monitoring, INR range 2-3    Long term (current) use of anticoagulants    Ventricular tachycardia    Essential hypertension    Left ventricular assist device (LVAD) complication       Patient is homebound due to:  NYHA Class IV HF. S/P LVAD placement.     Diet: Low Fat, Low cholesterol, 2Gm Na, Coumadin restrictions.    Acitivities: No Swimming, bathing, vacuuming, contact sports.    Fresh implants= Sternal Precautions    Nursing:   SN to complete comprehensive assessment including routine vital signs. Instruct on disease process and s/s of complications to report to MD. Review/verify medication list sent home with the patient at time of discharge  and instruct patient/caregiver as needed. Frequency may be adjusted depending on start of care date.    **LVAD driveline exit site dressing change is to be completed per LVAD patient/caregiver only**.    Notify MD if:  SBP > 120 or < 80;   MAP > 80 or < 65;   HR > 120 or < 60;   Temp > 101;   Weight gain >3lbs in 1 day or 5lbs in 1 week.    LABS:  SN to perform labs: PT/INR per Coumadin clinic [phone].   Follow up INR date: 11/18/19  No  Finger Sticks      Send initial Home Health orders to HTS attending physician on call.  Send follow up questions to VAD clinic MD [phone] or [phone].

## 2019-11-16 LAB
ANION GAP SERPL CALC-SCNC: 11 MMOL/L (ref 8–16)
ANION GAP SERPL CALC-SCNC: 11 MMOL/L (ref 8–16)
ANION GAP SERPL CALC-SCNC: 12 MMOL/L (ref 8–16)
APTT BLDCRRT: 25.1 SEC (ref 21–32)
BASOPHILS # BLD AUTO: 0.05 K/UL (ref 0–0.2)
BASOPHILS NFR BLD: 1.3 % (ref 0–1.9)
BUN SERPL-MCNC: 26 MG/DL (ref 6–20)
CALCIUM SERPL-MCNC: 9.6 MG/DL (ref 8.7–10.5)
CALCIUM SERPL-MCNC: 9.8 MG/DL (ref 8.7–10.5)
CALCIUM SERPL-MCNC: 9.9 MG/DL (ref 8.7–10.5)
CHLORIDE SERPL-SCNC: 104 MMOL/L (ref 95–110)
CHLORIDE SERPL-SCNC: 104 MMOL/L (ref 95–110)
CHLORIDE SERPL-SCNC: 106 MMOL/L (ref 95–110)
CO2 SERPL-SCNC: 21 MMOL/L (ref 23–29)
CO2 SERPL-SCNC: 23 MMOL/L (ref 23–29)
CO2 SERPL-SCNC: 24 MMOL/L (ref 23–29)
CREAT SERPL-MCNC: 1.8 MG/DL (ref 0.5–1.4)
CREAT SERPL-MCNC: 1.8 MG/DL (ref 0.5–1.4)
CREAT SERPL-MCNC: 1.9 MG/DL (ref 0.5–1.4)
DIFFERENTIAL METHOD: ABNORMAL
EOSINOPHIL # BLD AUTO: 0.1 K/UL (ref 0–0.5)
EOSINOPHIL NFR BLD: 2.8 % (ref 0–8)
ERYTHROCYTE [DISTWIDTH] IN BLOOD BY AUTOMATED COUNT: 17 % (ref 11.5–14.5)
EST. GFR  (AFRICAN AMERICAN): 34.9 ML/MIN/1.73 M^2
EST. GFR  (AFRICAN AMERICAN): 37.3 ML/MIN/1.73 M^2
EST. GFR  (AFRICAN AMERICAN): 37.3 ML/MIN/1.73 M^2
EST. GFR  (NON AFRICAN AMERICAN): 30.3 ML/MIN/1.73 M^2
EST. GFR  (NON AFRICAN AMERICAN): 32.4 ML/MIN/1.73 M^2
EST. GFR  (NON AFRICAN AMERICAN): 32.4 ML/MIN/1.73 M^2
GLUCOSE SERPL-MCNC: 114 MG/DL (ref 70–110)
GLUCOSE SERPL-MCNC: 130 MG/DL (ref 70–110)
GLUCOSE SERPL-MCNC: 130 MG/DL (ref 70–110)
HCT VFR BLD AUTO: 40.2 % (ref 37–48.5)
HGB BLD-MCNC: 11.6 G/DL (ref 12–16)
IMM GRANULOCYTES # BLD AUTO: 0.03 K/UL (ref 0–0.04)
IMM GRANULOCYTES NFR BLD AUTO: 0.8 % (ref 0–0.5)
INR PPP: 2.2 (ref 0.8–1.2)
LDH SERPL L TO P-CCNC: 223 U/L (ref 110–260)
LYMPHOCYTES # BLD AUTO: 1.1 K/UL (ref 1–4.8)
LYMPHOCYTES NFR BLD: 26.3 % (ref 18–48)
MAGNESIUM SERPL-MCNC: 2 MG/DL (ref 1.6–2.6)
MAGNESIUM SERPL-MCNC: 2 MG/DL (ref 1.6–2.6)
MCH RBC QN AUTO: 24.8 PG (ref 27–31)
MCHC RBC AUTO-ENTMCNC: 28.9 G/DL (ref 32–36)
MCV RBC AUTO: 86 FL (ref 82–98)
MONOCYTES # BLD AUTO: 0.2 K/UL (ref 0.3–1)
MONOCYTES NFR BLD: 6 % (ref 4–15)
NEUTROPHILS # BLD AUTO: 2.5 K/UL (ref 1.8–7.7)
NEUTROPHILS NFR BLD: 62.8 % (ref 38–73)
NRBC BLD-RTO: 0 /100 WBC
PHOSPHATE SERPL-MCNC: 3.5 MG/DL (ref 2.7–4.5)
PLATELET # BLD AUTO: 309 K/UL (ref 150–350)
PMV BLD AUTO: 9.5 FL (ref 9.2–12.9)
POTASSIUM SERPL-SCNC: 4.1 MMOL/L (ref 3.5–5.1)
POTASSIUM SERPL-SCNC: 4.2 MMOL/L (ref 3.5–5.1)
POTASSIUM SERPL-SCNC: 5.4 MMOL/L (ref 3.5–5.1)
PROTHROMBIN TIME: 21.1 SEC (ref 9–12.5)
RBC # BLD AUTO: 4.68 M/UL (ref 4–5.4)
SODIUM SERPL-SCNC: 137 MMOL/L (ref 136–145)
SODIUM SERPL-SCNC: 139 MMOL/L (ref 136–145)
SODIUM SERPL-SCNC: 140 MMOL/L (ref 136–145)
WBC # BLD AUTO: 3.99 K/UL (ref 3.9–12.7)

## 2019-11-16 PROCEDURE — 99233 SBSQ HOSP IP/OBS HIGH 50: CPT | Mod: NTX,,, | Performed by: INTERNAL MEDICINE

## 2019-11-16 PROCEDURE — 99900037 HC PT THERAPY SCREENING (STAT): Mod: NTX

## 2019-11-16 PROCEDURE — 20600001 HC STEP DOWN PRIVATE ROOM: Mod: NTX

## 2019-11-16 PROCEDURE — 85025 COMPLETE CBC W/AUTO DIFF WBC: CPT | Mod: NTX

## 2019-11-16 PROCEDURE — 93750 INTERROGATION VAD IN PERSON: CPT | Mod: NTX,,, | Performed by: INTERNAL MEDICINE

## 2019-11-16 PROCEDURE — 85730 THROMBOPLASTIN TIME PARTIAL: CPT | Mod: NTX

## 2019-11-16 PROCEDURE — 99233 PR SUBSEQUENT HOSPITAL CARE,LEVL III: ICD-10-PCS | Mod: NTX,,, | Performed by: INTERNAL MEDICINE

## 2019-11-16 PROCEDURE — 80048 BASIC METABOLIC PNL TOTAL CA: CPT | Mod: NTX

## 2019-11-16 PROCEDURE — 25000003 PHARM REV CODE 250: Mod: NTX | Performed by: INTERNAL MEDICINE

## 2019-11-16 PROCEDURE — 83735 ASSAY OF MAGNESIUM: CPT | Mod: NTX

## 2019-11-16 PROCEDURE — 83735 ASSAY OF MAGNESIUM: CPT | Mod: 91,NTX

## 2019-11-16 PROCEDURE — 83615 LACTATE (LD) (LDH) ENZYME: CPT | Mod: NTX

## 2019-11-16 PROCEDURE — 84100 ASSAY OF PHOSPHORUS: CPT | Mod: NTX

## 2019-11-16 PROCEDURE — 25000003 PHARM REV CODE 250: Mod: NTX | Performed by: PHYSICIAN ASSISTANT

## 2019-11-16 PROCEDURE — 93750 PR INTERROGATE VENT ASSIST DEV, IN PERSON, W PHYSICIAN ANALYSIS: ICD-10-PCS | Mod: NTX,,, | Performed by: INTERNAL MEDICINE

## 2019-11-16 PROCEDURE — 25000003 PHARM REV CODE 250: Mod: NTX | Performed by: STUDENT IN AN ORGANIZED HEALTH CARE EDUCATION/TRAINING PROGRAM

## 2019-11-16 PROCEDURE — 27000248 HC VAD-ADDITIONAL DAY: Mod: NTX

## 2019-11-16 PROCEDURE — 80048 BASIC METABOLIC PNL TOTAL CA: CPT | Mod: 91,NTX

## 2019-11-16 PROCEDURE — 36415 COLL VENOUS BLD VENIPUNCTURE: CPT | Mod: NTX

## 2019-11-16 PROCEDURE — 85610 PROTHROMBIN TIME: CPT | Mod: NTX

## 2019-11-16 PROCEDURE — 63600175 PHARM REV CODE 636 W HCPCS: Mod: NTX | Performed by: INTERNAL MEDICINE

## 2019-11-16 PROCEDURE — 63600175 PHARM REV CODE 636 W HCPCS: Mod: NTX | Performed by: PHYSICIAN ASSISTANT

## 2019-11-16 RX ORDER — SODIUM CHLORIDE 9 MG/ML
INJECTION, SOLUTION INTRAVENOUS CONTINUOUS
Status: DISCONTINUED | OUTPATIENT
Start: 2019-11-16 | End: 2019-11-16

## 2019-11-16 RX ADMIN — MEXILETINE HYDROCHLORIDE 150 MG: 150 CAPSULE ORAL at 05:11

## 2019-11-16 RX ADMIN — ZOLPIDEM TARTRATE 5 MG: 5 TABLET ORAL at 09:11

## 2019-11-16 RX ADMIN — VENLAFAXINE 150 MG: 37.5 TABLET ORAL at 09:11

## 2019-11-16 RX ADMIN — ALPRAZOLAM 0.25 MG: 0.25 TABLET ORAL at 09:11

## 2019-11-16 RX ADMIN — ASPIRIN 325 MG: 325 TABLET, DELAYED RELEASE ORAL at 09:11

## 2019-11-16 RX ADMIN — SODIUM CHLORIDE: 0.9 INJECTION, SOLUTION INTRAVENOUS at 06:11

## 2019-11-16 RX ADMIN — MEXILETINE HYDROCHLORIDE 150 MG: 150 CAPSULE ORAL at 02:11

## 2019-11-16 RX ADMIN — GABAPENTIN 200 MG: 100 CAPSULE ORAL at 09:11

## 2019-11-16 RX ADMIN — SPIRONOLACTONE 12.5 MG: 25 TABLET, FILM COATED ORAL at 09:11

## 2019-11-16 RX ADMIN — HYDRALAZINE HYDROCHLORIDE 25 MG: 25 TABLET ORAL at 05:11

## 2019-11-16 RX ADMIN — HYDRALAZINE HYDROCHLORIDE 25 MG: 25 TABLET ORAL at 02:11

## 2019-11-16 RX ADMIN — AMLODIPINE BESYLATE 5 MG: 5 TABLET ORAL at 09:11

## 2019-11-16 RX ADMIN — PANTOPRAZOLE SODIUM 40 MG: 40 TABLET, DELAYED RELEASE ORAL at 09:11

## 2019-11-16 RX ADMIN — FUROSEMIDE 80 MG: 10 INJECTION, SOLUTION INTRAMUSCULAR; INTRAVENOUS at 09:11

## 2019-11-16 RX ADMIN — WARFARIN SODIUM 1.5 MG: 1 TABLET ORAL at 05:11

## 2019-11-16 RX ADMIN — MIRTAZAPINE 15 MG: 15 TABLET, FILM COATED ORAL at 09:11

## 2019-11-16 RX ADMIN — LISINOPRIL 10 MG: 10 TABLET ORAL at 09:11

## 2019-11-16 RX ADMIN — SENNOSIDES AND DOCUSATE SODIUM 2 TABLET: 8.6; 5 TABLET ORAL at 09:11

## 2019-11-16 RX ADMIN — AMIODARONE HYDROCHLORIDE 400 MG: 200 TABLET ORAL at 09:11

## 2019-11-16 RX ADMIN — MEXILETINE HYDROCHLORIDE 150 MG: 150 CAPSULE ORAL at 09:11

## 2019-11-16 RX ADMIN — MAGNESIUM OXIDE TAB 400 MG (241.3 MG ELEMENTAL MG) 400 MG: 400 (241.3 MG) TAB at 09:11

## 2019-11-16 NOTE — PROGRESS NOTES
Pt had another run of V-tach lasting longer than 10 seconds. Remains asymptomatic. No events noted on the monitor. MD Ordaz notified. Strip posted in chart Labs put in STAT. Will continue to monitor.

## 2019-11-16 NOTE — PLAN OF CARE
Pt free of falls/trauma/injuries.  Denies c/o SOB, CP, or discomfort.  Generalized skin remains CDI; generalized edema noted.  LVAD working properly this shift without any complications.  LVAD dressing to be changed daily with soap/NS; dressing remains CDI.   Pt being diuresed with lasix; diuresing well. Wt trending down. Potassium 3.6 in the AM and 3.4 following; replaced with 60mEq x2; K 5.4. Has had multiple runs of V-tach during shift, remains stable. Needs device interrogation. No low flows noted during shift; a lot of PI events. VSS. Fall bundle in place. POC explained, no questions at this time. Pt tolerating plan of care.

## 2019-11-16 NOTE — SUBJECTIVE & OBJECTIVE
Interval History: Had few low flow last night but no alarms. Also had 2 VT episodes where she was paced out. She didn't feel anything this morning. Good diuresis with IVP lasix.     Continuous Infusions:  Scheduled Meds:   amiodarone  400 mg Oral Daily    amLODIPine  5 mg Oral Daily    aspirin  325 mg Oral Daily    furosemide  80 mg Intravenous TID    gabapentin  200 mg Oral BID    hydrALAZINE  25 mg Oral Q8H    lisinopril  10 mg Oral BID    magnesium oxide  400 mg Oral Daily    mexiletine  150 mg Oral Q8H    mirtazapine  15 mg Oral QHS    pantoprazole  40 mg Oral Daily    spironolactone  12.5 mg Oral Daily    venlafaxine  150 mg Oral Daily    warfarin  1.5 mg Oral Daily     PRN Meds:ALPRAZolam, pneumoc 13-amber conj-dip cr(PF), promethazine, senna-docusate 8.6-50 mg, zolpidem    Review of patient's allergies indicates:   Allergen Reactions    Adhesive Blisters     Reaction to area in chest and up only    Codeine Itching     Objective:     Vital Signs (Most Recent):  Temp: 97.9 °F (36.6 °C) (11/16/19 0751)  Pulse: 80 (11/16/19 0751)  Resp: 16 (11/16/19 0751)  BP: (!) 82/0 (11/16/19 0751)  SpO2: 96 % (11/16/19 0751) Vital Signs (24h Range):  Temp:  [97.5 °F (36.4 °C)-98.8 °F (37.1 °C)] 97.9 °F (36.6 °C)  Pulse:  [] 80  Resp:  [16-18] 16  SpO2:  [96 %-99 %] 96 %  BP: (80-86)/(0) 82/0     Patient Vitals for the past 72 hrs (Last 3 readings):   Weight   11/16/19 0600 100.9 kg (222 lb 7.1 oz)   11/15/19 0500 101 kg (222 lb 10.6 oz)   11/14/19 0500 101.5 kg (223 lb 12.3 oz)     Body mass index is 34.84 kg/m².      Intake/Output Summary (Last 24 hours) at 11/16/2019 1006  Last data filed at 11/16/2019 0926  Gross per 24 hour   Intake 1108 ml   Output 2600 ml   Net -1492 ml       Hemodynamic Parameters:        Physical Exam  Constitutional: laying in bed NAD  Neck: Normal range of motion. Neck supple. JVD elevation to  mid neck with patient sitting in bed  Cardiovascular: Normal rate, regular  rhythm, Smooth VAD hum. No murmur heard.  Pulmonary/Chest: Effort normal. No stridor. No respiratory distress. She has decreased breath sounds in the left lower field. She has no wheezes. She has no rales.   Abdominal: Soft. Normal appearance, normal aorta and bowel sounds are normal. She exhibits no distension. There is no tenderness.   Musculoskeletal: Normal range of motion. She exhibits no edema or tenderness.   Neurological: She is alert and oriented to person, place, and time. She has normal strength and normal reflexes. Coordination normal.   Skin: Skin is warm and dry. No rash noted. No erythema  Significant Labs:  CBC:  Recent Labs   Lab 11/14/19  0407 11/15/19  0428 11/16/19  0604   WBC 3.84* 4.52 3.99   RBC 4.49 4.41 4.68   HGB 11.2* 10.9* 11.6*   HCT 39.2 38.0 40.2    320 309   MCV 87 86 86   MCH 24.9* 24.7* 24.8*   MCHC 28.6* 28.7* 28.9*     BNP:  Recent Labs   Lab 11/13/19  1038 11/15/19  0428   * 207*     CMP:  Recent Labs   Lab 11/13/19  1038  11/15/19  0428 11/15/19  1238 11/15/19  2222 11/16/19  0604   *   < > 96 127* 114* 130*  130*   CALCIUM 9.6   < > 9.4 9.8 9.9 9.6  9.8   ALBUMIN 3.6  --  3.4*  --   --   --    PROT 7.2  --  7.2  --   --   --       < > 140 141 137 139  140   K 3.9   < > 3.6 3.4* 5.4* 4.1  4.2   CO2 23   < > 25 25 21* 24  23      < > 102 103 104 104  106   BUN 19   < > 21* 21* 26* 26*  26*   CREATININE 1.7*   < > 1.7* 1.7* 1.8* 1.9*  1.8*   ALKPHOS 92  --  91  --   --   --    ALT 16  --  12  --   --   --    AST 20  --  15  --   --   --    BILITOT 0.6  --  0.5  --   --   --     < > = values in this interval not displayed.      Coagulation:   Recent Labs   Lab 11/14/19  0407 11/15/19  0428 11/16/19  0604   INR 2.3* 2.4* 2.2*   APTT 33.8* 34.1* 25.1     LDH:  Recent Labs   Lab 11/13/19  1038 11/14/19 0407 11/15/19  0428 11/16/19  0604    227 215 223     Microbiology:  Microbiology Results (last 7 days)     ** No results found for the  last 168 hours. **          I have reviewed all pertinent labs within the past 24 hours.    Estimated Creatinine Clearance: 43.2 mL/min (A) (based on SCr of 1.9 mg/dL (H)).    Diagnostic Results:  I have reviewed and interpreted all pertinent imaging results/findings within the past 24 hours.

## 2019-11-16 NOTE — NURSING
"Pt's LVAD alarming with LOW FLOW x2. F 2.4-2.5 with alarms, though flow noting to be 2.2 upon entry to room with no alarm. Speed noted to be increased to 5400, PI 8.6-8.7. Power maintaining 3.4-3.6. Pt reporting feeling lightheaded and reporting "I can feel my heart thumping in my chest". No events noted on tele, pt noted to be pacing with conduction defect. HR 170s, however  No apparent rhythm change noted vs. previous HR of 107. MD Mino notified; stated would come to bedside to assess pt. Will continue to monitor.  "

## 2019-11-16 NOTE — PT/OT/SLP PROGRESS
Physical Therapy Screen and Discharge       Patient Name:  Deborah Navas   MRN:  [id number]    PT orders received and acknowledged. Pt reports she is at baseline mobility, independent with all mobility and ADLs. Pt reports she has been ambulating independently since admission in room, unsure if she is safe to ambulate in hallway with fluctuating VAD numbers. PT will clarify with med team and inform patient. Pt denies SOB, or recent falls, reports occasional dizziness that pt reports is related to presence of low flow alarms. Pt reports independence with VAD management with no concerns. Pt denies need for acute skilled therapy intervention. PT provided education to pt regarding importance of maintaining frequent activity and ambulating while admitted to maintain independent level of mobility. Pt verbalized understanding. Pt does not require further acute skilled therapy intervention. Discharge from PT services and re-consult if pt experiences a change in status. No billable units this date.     Consuelo Toledo, PT, DPT  11/16/2019  [phone]

## 2019-11-16 NOTE — PROGRESS NOTES
11/16/2019  Trevin Sow    Current provider:  Vi Bryant MD      I, Trevin Sow, rounded on Deborah Navas to ensure all mechanical assist device settings (IABP or VAD) were appropriate and all parameters were within limits.  I was able to ensure all back up equipment was present, the staff had no issues, and the Perfusion Department daily rounding was complete.    12:50 PM

## 2019-11-16 NOTE — ASSESSMENT & PLAN NOTE
-HeartMate 3 Implanted 9/10/19 as DT.  -Implanted by Dr. Walden  -INR slightly therapeutic. Goal INR 2.0-3.0. Continue Coumadin.  Previous home dose was 3mg on Wed and 1.5mg QDaily  -Antiplatelets:  mg.  -LDH is stable Will monitor daily.   -Speed set at 5300;   -Not listed for OHTx: was declined due to pulmonary hypertension and incomplete care giving plan    Procedure: Device Interrogation Including analysis of device parameters  Current Settings: Ventricular Assist Device  Review of device function is stable    TXP LVAD INTERROGATIONS 11/16/2019 11/16/2019 11/15/2019 11/15/2019 11/15/2019 11/15/2019 11/15/2019   Type HeartMate3 HeartMate3 HeartMate3 HeartMate3 HeartMate3 HeartMate3 HeartMate3   Flow 3.5 3.0 3.4 3.7 3.1 3.0 3.2   Speed 5300 4900 5300 5300 5300 5300 5300   PI 5.0 5.5 3.3 4.9 7.1 3.1 3.6   Power (Orellana) 4.9 3.4 3.7 3.7 3.7 3.7 3.7   LSL 4900 4900 4900 4900 4900 4900 4900   Pulsatility Intermittent pulse - - - Intermittent pulse Intermittent pulse Intermittent pulse

## 2019-11-16 NOTE — PROGRESS NOTES
Pt had 2 9-10 beat runs of V-tach. Pt asymptomatic. NO events noted on the monitor. Last PI event at 0245. F: 3.8, S: 5150. P: 3.5. PI: 4.1. Potassium 5.4. MD Ordaz notified.

## 2019-11-16 NOTE — PROGRESS NOTES
[facility]  Heart Transplant  Progress Note    Patient Name: Deborah Navas  MRN: [id number]  Admission Date: 11/13/2019  Hospital Length of Stay: 1 days  Attending Physician: Vi Bryant MD  Primary Care Provider: Primary Doctor No  Principal Problem:<principal problem not specified>    Subjective:     Interval History: Had few low flow last night but no alarms. Also had 2 VT episodes where she was paced out. She didn't feel anything this morning.     Continuous Infusions:  Scheduled Meds:   amiodarone  400 mg Oral Daily    amLODIPine  5 mg Oral Daily    aspirin  325 mg Oral Daily    furosemide  80 mg Intravenous TID    gabapentin  200 mg Oral BID    hydrALAZINE  25 mg Oral Q8H    lisinopril  10 mg Oral BID    magnesium oxide  400 mg Oral Daily    mexiletine  150 mg Oral Q8H    mirtazapine  15 mg Oral QHS    pantoprazole  40 mg Oral Daily    spironolactone  12.5 mg Oral Daily    venlafaxine  150 mg Oral Daily    warfarin  1.5 mg Oral Daily     PRN Meds:ALPRAZolam, pneumoc 13-amber conj-dip cr(PF), promethazine, senna-docusate 8.6-50 mg, zolpidem    Review of patient's allergies indicates:   Allergen Reactions    Adhesive Blisters     Reaction to area in chest and up only    Codeine Itching     Objective:     Vital Signs (Most Recent):  Temp: 97.9 °F (36.6 °C) (11/16/19 0751)  Pulse: 80 (11/16/19 0751)  Resp: 16 (11/16/19 0751)  BP: (!) 82/0 (11/16/19 0751)  SpO2: 96 % (11/16/19 0751) Vital Signs (24h Range):  Temp:  [97.5 °F (36.4 °C)-98.8 °F (37.1 °C)] 97.9 °F (36.6 °C)  Pulse:  [] 80  Resp:  [16-18] 16  SpO2:  [96 %-99 %] 96 %  BP: (80-86)/(0) 82/0     Patient Vitals for the past 72 hrs (Last 3 readings):   Weight   11/16/19 0600 100.9 kg (222 lb 7.1 oz)   11/15/19 0500 101 kg (222 lb 10.6 oz)   11/14/19 0500 101.5 kg (223 lb 12.3 oz)     Body mass index is 34.84 kg/m².      Intake/Output Summary (Last 24 hours) at 11/16/2019 1006  Last data filed at 11/16/2019  0926  Gross per 24 hour   Intake 1108 ml   Output 2600 ml   Net -1492 ml       Hemodynamic Parameters:        Physical Exam  Constitutional: laying in bed NAD  Neck: Normal range of motion. Neck supple. JVD elevation to  mid neck with patient sitting in bed  Cardiovascular: Normal rate, regular rhythm, Smooth VAD hum. No murmur heard.  Pulmonary/Chest: Effort normal. No stridor. No respiratory distress. She has decreased breath sounds in the left lower field. She has no wheezes. She has no rales.   Abdominal: Soft. Normal appearance, normal aorta and bowel sounds are normal. She exhibits no distension. There is no tenderness.   Musculoskeletal: Normal range of motion. She exhibits no edema or tenderness.   Neurological: She is alert and oriented to person, place, and time. She has normal strength and normal reflexes. Coordination normal.   Skin: Skin is warm and dry. No rash noted. No erythema  Significant Labs:  CBC:  Recent Labs   Lab 11/14/19  0407 11/15/19  0428 11/16/19  0604   WBC 3.84* 4.52 3.99   RBC 4.49 4.41 4.68   HGB 11.2* 10.9* 11.6*   HCT 39.2 38.0 40.2    320 309   MCV 87 86 86   MCH 24.9* 24.7* 24.8*   MCHC 28.6* 28.7* 28.9*     BNP:  Recent Labs   Lab 11/13/19  1038 11/15/19  0428   * 207*     CMP:  Recent Labs   Lab 11/13/19  1038  11/15/19  0428 11/15/19  1238 11/15/19  2222 11/16/19  0604   *   < > 96 127* 114* 130*  130*   CALCIUM 9.6   < > 9.4 9.8 9.9 9.6  9.8   ALBUMIN 3.6  --  3.4*  --   --   --    PROT 7.2  --  7.2  --   --   --       < > 140 141 137 139  140   K 3.9   < > 3.6 3.4* 5.4* 4.1  4.2   CO2 23   < > 25 25 21* 24  23      < > 102 103 104 104  106   BUN 19   < > 21* 21* 26* 26*  26*   CREATININE 1.7*   < > 1.7* 1.7* 1.8* 1.9*  1.8*   ALKPHOS 92  --  91  --   --   --    ALT 16  --  12  --   --   --    AST 20  --  15  --   --   --    BILITOT 0.6  --  0.5  --   --   --     < > = values in this interval not displayed.      Coagulation:   Recent  Labs   Lab 11/14/19  0407 11/15/19  0428 11/16/19  0604   INR 2.3* 2.4* 2.2*   APTT 33.8* 34.1* 25.1     LDH:  Recent Labs   Lab 11/13/19  1038 11/14/19  0407 11/15/19  0428 11/16/19  0604    227 215 223     Microbiology:  Microbiology Results (last 7 days)     ** No results found for the last 168 hours. **          I have reviewed all pertinent labs within the past 24 hours.    Estimated Creatinine Clearance: 43.2 mL/min (A) (based on SCr of 1.9 mg/dL (H)).    Diagnostic Results:  I have reviewed and interpreted all pertinent imaging results/findings within the past 24 hours.    Assessment and Plan:     50 yo WF with NICM, s/p HM3 implantation 9/10/19 (post up coarse uncomplicated with the exception of+ diaphragmatic stimulation of LV lead and pleural effusion with chest tube placement, atrial flutter with NADINE/DCCV), V-fib reported in setting of hypokalemia with appropriate shock from ICD on Amiodarone prior to LVAD placement; and recent hospitalizations for ventricular arrythmias; started on Mexilitine.  She was seen in EP clinic today and she continues to have multiple VT episodes with some ATP therapy, although no ICD shocks since starting mexiletine 10/24/2019. One slow VT episode 2.5hrs 11/3/2019. Patient asymptomatic with LVAD. On coumadin. No AFL since post-op event (paced out of rhythm 9/24/2019). CHB with 100% V pacing (LV lead off). She does not feel well. Dry heaving with mexiletine. Taking phenergan PRN. 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   She was seen in HTS clinic and reported 4 low flow alarms the last 4 nights.  She reports they occur in her sleep and last for only a few seconds.  By the time she wakes up; they quickly stop.  She does report to possibly being little volume overloaded.  She hasn't noticed weight gain at home but thinks she may have little extra fluid.  She denies SOB, chest pain, palpitations, LEGER. In review of her records: she was  223lbs on 10/24/19 during previous hospitalization and is 235lbs now.  Her lasix had previously been decreased to prn.     Left ventricular assist device (LVAD) complication  -hx low flow alarms prior to admit and again the last 24 hours  -Possibly secondary to ADHF.  -Echo unremarkable.  Formal report of CT unremarkable; however, it was a non contrast as creat was elevated above baseline on admit and inflow and outflow cannulars not accurately visualized   -Will monitor response to diuresis  -BP well controlled.       Essential hypertension  -Patient is non-pulsatile  -Doppler goal 60-90 mmHg  -Antihypertensive medications include  Lisinopril,  -Hydralazine was started on evening of admit, but will change to low dose Norvasc and Spironolactone to aid with BP and low K+.  Hesitated on increasing Lisinopril because of active diuresis and concern for fluctuation of creatinine     Ventricular tachycardia  -Per EP office visit on day of admission: She continues to have multiple VT episodes with some ATP therapy, although no ICD shocks since starting mexiletine 10/24/2019. One slow VT episode 2.5hrs 11/3/2019. Patient asymptomatic with LVAD. On coumadin. No AFL since post-op event (paced out of rhythm 9/24/2019). CHB with 100% V pacing (LV lead off).   -Plan was to continue current regimen despite Mexiletine making her nauseous.  -Decreased Amiodarone to daily per EP plan on discharge last hospitalization     LVAD (left ventricular assist device) present  -HeartMate 3 Implanted 9/10/19 as DT.  -Implanted by Dr. Walden  -INR slightly therapeutic. Goal INR 2.0-3.0. Continue Coumadin.  Previous home dose was 3mg on Wed and 1.5mg QDaily  -Antiplatelets:  mg.  -LDH is stable Will monitor daily.   -Speed set at 5300;   -Not listed for OHTx: was declined due to pulmonary hypertension and incomplete care giving plan    Procedure: Device Interrogation Including analysis of device parameters  Current Settings: Ventricular  Assist Device  Review of device function is stable    TXP LVAD INTERROGATIONS 11/16/2019 11/16/2019 11/15/2019 11/15/2019 11/15/2019 11/15/2019 11/15/2019   Type HeartMate3 HeartMate3 HeartMate3 HeartMate3 HeartMate3 HeartMate3 HeartMate3   Flow 3.5 3.0 3.4 3.7 3.1 3.0 3.2   Speed 5300 4900 5300 5300 5300 5300 5300   PI 5.0 5.5 3.3 4.9 7.1 3.1 3.6   Power (Orellana) 4.9 3.4 3.7 3.7 3.7 3.7 3.7   LSL 4900 4900 4900 4900 4900 4900 4900   Pulsatility Intermittent pulse - - - Intermittent pulse Intermittent pulse Intermittent pulse       GLO on CKD (chronic kidney disease)  -Baseline creat appears 1.3-1.6.   - Cr trending up slowly .1.9 today.Will stop IV  Lasix and place her on home dose lasix tomorrow      Acute on chronic combined systolic and diastolic heart failure  -NICM  -Last 2D Echo 11/13/19: LVEF 20%, LVEDD 6.3 cm with speed at 5300  -Diuresed Will on IVP Lasix and was planning on transitioning to po this am, but she still looks volume overloaded so will continue IV Lasix.   -2g Na dietary restriction, 1500 mL fluid restriction, strict I/Os      ICD (implantable cardioverter-defibrillator) in place  -[employer] ICD  -See above VT        Dandre Hunter MD  Heart Transplant  [facility]

## 2019-11-17 LAB
ANION GAP SERPL CALC-SCNC: 13 MMOL/L (ref 8–16)
APTT BLDCRRT: 30.5 SEC (ref 21–32)
BASOPHILS # BLD AUTO: 0.03 K/UL (ref 0–0.2)
BASOPHILS NFR BLD: 0.7 % (ref 0–1.9)
BUN SERPL-MCNC: 33 MG/DL (ref 6–20)
CALCIUM SERPL-MCNC: 9.4 MG/DL (ref 8.7–10.5)
CHLORIDE SERPL-SCNC: 105 MMOL/L (ref 95–110)
CO2 SERPL-SCNC: 19 MMOL/L (ref 23–29)
CREAT SERPL-MCNC: 1.9 MG/DL (ref 0.5–1.4)
DIFFERENTIAL METHOD: ABNORMAL
EOSINOPHIL # BLD AUTO: 0.1 K/UL (ref 0–0.5)
EOSINOPHIL NFR BLD: 3.3 % (ref 0–8)
ERYTHROCYTE [DISTWIDTH] IN BLOOD BY AUTOMATED COUNT: 17 % (ref 11.5–14.5)
EST. GFR  (AFRICAN AMERICAN): 34.9 ML/MIN/1.73 M^2
EST. GFR  (NON AFRICAN AMERICAN): 30.3 ML/MIN/1.73 M^2
GLUCOSE SERPL-MCNC: 113 MG/DL (ref 70–110)
HCT VFR BLD AUTO: 40.2 % (ref 37–48.5)
HGB BLD-MCNC: 11.5 G/DL (ref 12–16)
IMM GRANULOCYTES # BLD AUTO: 0.01 K/UL (ref 0–0.04)
IMM GRANULOCYTES NFR BLD AUTO: 0.2 % (ref 0–0.5)
INR PPP: 2 (ref 0.8–1.2)
LDH SERPL L TO P-CCNC: 220 U/L (ref 110–260)
LYMPHOCYTES # BLD AUTO: 1.2 K/UL (ref 1–4.8)
LYMPHOCYTES NFR BLD: 28.4 % (ref 18–48)
MAGNESIUM SERPL-MCNC: 2.1 MG/DL (ref 1.6–2.6)
MCH RBC QN AUTO: 24.9 PG (ref 27–31)
MCHC RBC AUTO-ENTMCNC: 28.6 G/DL (ref 32–36)
MCV RBC AUTO: 87 FL (ref 82–98)
MONOCYTES # BLD AUTO: 0.3 K/UL (ref 0.3–1)
MONOCYTES NFR BLD: 6.2 % (ref 4–15)
NEUTROPHILS # BLD AUTO: 2.6 K/UL (ref 1.8–7.7)
NEUTROPHILS NFR BLD: 61.2 % (ref 38–73)
NRBC BLD-RTO: 0 /100 WBC
PHOSPHATE SERPL-MCNC: 3.3 MG/DL (ref 2.7–4.5)
PLATELET # BLD AUTO: 310 K/UL (ref 150–350)
PMV BLD AUTO: 10 FL (ref 9.2–12.9)
POTASSIUM SERPL-SCNC: 4.1 MMOL/L (ref 3.5–5.1)
PROTHROMBIN TIME: 19.7 SEC (ref 9–12.5)
RBC # BLD AUTO: 4.62 M/UL (ref 4–5.4)
SODIUM SERPL-SCNC: 137 MMOL/L (ref 136–145)
WBC # BLD AUTO: 4.22 K/UL (ref 3.9–12.7)

## 2019-11-17 PROCEDURE — 93750 INTERROGATION VAD IN PERSON: CPT | Mod: NTX,,, | Performed by: INTERNAL MEDICINE

## 2019-11-17 PROCEDURE — 25000003 PHARM REV CODE 250: Mod: NTX | Performed by: HOSPITALIST

## 2019-11-17 PROCEDURE — 93750 PR INTERROGATE VENT ASSIST DEV, IN PERSON, W PHYSICIAN ANALYSIS: ICD-10-PCS | Mod: NTX,,, | Performed by: INTERNAL MEDICINE

## 2019-11-17 PROCEDURE — 36415 COLL VENOUS BLD VENIPUNCTURE: CPT | Mod: NTX

## 2019-11-17 PROCEDURE — 27000248 HC VAD-ADDITIONAL DAY: Mod: NTX

## 2019-11-17 PROCEDURE — 83735 ASSAY OF MAGNESIUM: CPT | Mod: NTX

## 2019-11-17 PROCEDURE — 85730 THROMBOPLASTIN TIME PARTIAL: CPT | Mod: NTX

## 2019-11-17 PROCEDURE — 84100 ASSAY OF PHOSPHORUS: CPT | Mod: NTX

## 2019-11-17 PROCEDURE — 99233 SBSQ HOSP IP/OBS HIGH 50: CPT | Mod: NTX,,, | Performed by: INTERNAL MEDICINE

## 2019-11-17 PROCEDURE — 80048 BASIC METABOLIC PNL TOTAL CA: CPT | Mod: NTX

## 2019-11-17 PROCEDURE — 25000003 PHARM REV CODE 250: Mod: NTX | Performed by: PHYSICIAN ASSISTANT

## 2019-11-17 PROCEDURE — 99233 PR SUBSEQUENT HOSPITAL CARE,LEVL III: ICD-10-PCS | Mod: NTX,,, | Performed by: INTERNAL MEDICINE

## 2019-11-17 PROCEDURE — 85610 PROTHROMBIN TIME: CPT | Mod: NTX

## 2019-11-17 PROCEDURE — 83615 LACTATE (LD) (LDH) ENZYME: CPT | Mod: NTX

## 2019-11-17 PROCEDURE — 85025 COMPLETE CBC W/AUTO DIFF WBC: CPT | Mod: NTX

## 2019-11-17 PROCEDURE — 20600001 HC STEP DOWN PRIVATE ROOM: Mod: NTX

## 2019-11-17 PROCEDURE — 63600175 PHARM REV CODE 636 W HCPCS: Mod: NTX | Performed by: HOSPITALIST

## 2019-11-17 RX ORDER — SODIUM CHLORIDE 9 MG/ML
INJECTION, SOLUTION INTRAVENOUS CONTINUOUS
Status: DISCONTINUED | OUTPATIENT
Start: 2019-11-17 | End: 2019-11-17

## 2019-11-17 RX ORDER — WARFARIN 3 MG/1
3 TABLET ORAL DAILY
Status: DISCONTINUED | OUTPATIENT
Start: 2019-11-17 | End: 2019-11-17

## 2019-11-17 RX ADMIN — ALPRAZOLAM 0.25 MG: 0.25 TABLET ORAL at 09:11

## 2019-11-17 RX ADMIN — AMIODARONE HYDROCHLORIDE 400 MG: 200 TABLET ORAL at 10:11

## 2019-11-17 RX ADMIN — SPIRONOLACTONE 12.5 MG: 25 TABLET, FILM COATED ORAL at 10:11

## 2019-11-17 RX ADMIN — VENLAFAXINE 150 MG: 37.5 TABLET ORAL at 10:11

## 2019-11-17 RX ADMIN — PANTOPRAZOLE SODIUM 40 MG: 40 TABLET, DELAYED RELEASE ORAL at 10:11

## 2019-11-17 RX ADMIN — ASPIRIN 325 MG: 325 TABLET, DELAYED RELEASE ORAL at 10:11

## 2019-11-17 RX ADMIN — MEXILETINE HYDROCHLORIDE 150 MG: 150 CAPSULE ORAL at 09:11

## 2019-11-17 RX ADMIN — GABAPENTIN 200 MG: 100 CAPSULE ORAL at 09:11

## 2019-11-17 RX ADMIN — LISINOPRIL 10 MG: 10 TABLET ORAL at 09:11

## 2019-11-17 RX ADMIN — SODIUM CHLORIDE: 0.9 INJECTION, SOLUTION INTRAVENOUS at 01:11

## 2019-11-17 RX ADMIN — MEXILETINE HYDROCHLORIDE 150 MG: 150 CAPSULE ORAL at 03:11

## 2019-11-17 RX ADMIN — WARFARIN SODIUM 1.5 MG: 1 TABLET ORAL at 05:11

## 2019-11-17 RX ADMIN — MAGNESIUM OXIDE TAB 400 MG (241.3 MG ELEMENTAL MG) 400 MG: 400 (241.3 MG) TAB at 10:11

## 2019-11-17 RX ADMIN — MEXILETINE HYDROCHLORIDE 150 MG: 150 CAPSULE ORAL at 05:11

## 2019-11-17 RX ADMIN — MIRTAZAPINE 15 MG: 15 TABLET, FILM COATED ORAL at 09:11

## 2019-11-17 RX ADMIN — LISINOPRIL 10 MG: 10 TABLET ORAL at 10:11

## 2019-11-17 RX ADMIN — GABAPENTIN 200 MG: 100 CAPSULE ORAL at 10:11

## 2019-11-17 RX ADMIN — ALPRAZOLAM 0.25 MG: 0.25 TABLET ORAL at 03:11

## 2019-11-17 NOTE — SUBJECTIVE & OBJECTIVE
Interval History: Orthostatics negative. No alarms but flow dropped to 2.5 few times this morning. Patient states she feels better this morning compared to yesterday. Received 500cc fluids after she had few low flows and positive orthostatics. Lasix stopped yesterday     Continuous Infusions:  Scheduled Meds:   amiodarone  400 mg Oral Daily    aspirin  325 mg Oral Daily    gabapentin  200 mg Oral BID    lisinopril  10 mg Oral BID    magnesium oxide  400 mg Oral Daily    mexiletine  150 mg Oral Q8H    mirtazapine  15 mg Oral QHS    pantoprazole  40 mg Oral Daily    spironolactone  12.5 mg Oral Daily    venlafaxine  150 mg Oral Daily    warfarin  1.5 mg Oral Daily     PRN Meds:ALPRAZolam, pneumoc 13-amber conj-dip cr(PF), promethazine, senna-docusate 8.6-50 mg, zolpidem    Review of patient's allergies indicates:   Allergen Reactions    Adhesive Blisters     Reaction to area in chest and up only    Codeine Itching     Objective:     Vital Signs (Most Recent):  Temp: 97.9 °F (36.6 °C) (11/17/19 0447)  Pulse: 73 (11/17/19 0706)  Resp: 18 (11/17/19 0806)  BP: (!) 82/0 (11/17/19 0806)  SpO2: 100 % (11/17/19 0806) Vital Signs (24h Range):  Temp:  [97.3 °F (36.3 °C)-98.1 °F (36.7 °C)] 97.9 °F (36.6 °C)  Pulse:  [] 73  Resp:  [18] 18  SpO2:  [96 %-100 %] 100 %  BP: (40-84)/(0) 82/0     Patient Vitals for the past 72 hrs (Last 3 readings):   Weight   11/17/19 0447 102.4 kg (225 lb 12 oz)   11/16/19 0600 100.9 kg (222 lb 7.1 oz)   11/15/19 0500 101 kg (222 lb 10.6 oz)     Body mass index is 35.36 kg/m².      Intake/Output Summary (Last 24 hours) at 11/17/2019 0840  Last data filed at 11/17/2019 0600  Gross per 24 hour   Intake 1540 ml   Output 2250 ml   Net -710 ml       Hemodynamic Parameters:       Telemetry: NSR    Physical Exam  Constitutional: laying in bed NAD  Neck: Normal range of motion. Neck supple. JVP at clavicle   Cardiovascular: Normal rate, regular rhythm, Smooth VAD hum. No  murmur heard.  Pulmonary/Chest: Effort normal. No stridor. No respiratory distress. She has decreased breath sounds in the left lower field. She has no wheezes. She has no rales.   Abdominal: Soft. Normal appearance, normal aorta and bowel sounds are normal. She exhibits no distension. There is no tenderness.   Musculoskeletal: Normal range of motion. She exhibits no edema or tenderness.   Neurological: She is alert and oriented to person, place, and time. She has normal strength and normal reflexes. Coordination normal.   Skin: Skin is warm and dry. No rash noted. No erythema  Significant Labs:  CBC:  Recent Labs   Lab 11/15/19  0428 11/16/19  0604 11/17/19  0345   WBC 4.52 3.99 4.22   RBC 4.41 4.68 4.62   HGB 10.9* 11.6* 11.5*   HCT 38.0 40.2 40.2    309 310   MCV 86 86 87   MCH 24.7* 24.8* 24.9*   MCHC 28.7* 28.9* 28.6*     BNP:  Recent Labs   Lab 11/13/19  1038 11/15/19  0428   * 207*     CMP:  Recent Labs   Lab 11/13/19  1038  11/15/19  0428  11/15/19  2222 11/16/19  0604 11/17/19  0344   *   < > 96   < > 114* 130*  130* 113*   CALCIUM 9.6   < > 9.4   < > 9.9 9.6  9.8 9.4   ALBUMIN 3.6  --  3.4*  --   --   --   --    PROT 7.2  --  7.2  --   --   --   --       < > 140   < > 137 139  140 137   K 3.9   < > 3.6   < > 5.4* 4.1  4.2 4.1   CO2 23   < > 25   < > 21* 24  23 19*      < > 102   < > 104 104  106 105   BUN 19   < > 21*   < > 26* 26*  26* 33*   CREATININE 1.7*   < > 1.7*   < > 1.8* 1.9*  1.8* 1.9*   ALKPHOS 92  --  91  --   --   --   --    ALT 16  --  12  --   --   --   --    AST 20  --  15  --   --   --   --    BILITOT 0.6  --  0.5  --   --   --   --     < > = values in this interval not displayed.      Coagulation:   Recent Labs   Lab 11/15/19  0428 11/16/19  0604 11/17/19  0345   INR 2.4* 2.2* 2.0*   APTT 34.1* 25.1 30.5     LDH:  Recent Labs   Lab 11/15/19  0428 11/16/19  0604 11/17/19  0345    223 220     Microbiology:  Microbiology Results (last 7 days)      ** No results found for the last 168 hours. **          I have reviewed all pertinent labs within the past 24 hours.    Estimated Creatinine Clearance: 43.6 mL/min (A) (based on SCr of 1.9 mg/dL (H)).    Diagnostic Results:  I have reviewed and interpreted all pertinent imaging results/findings within the past 24 hours.

## 2019-11-17 NOTE — PROGRESS NOTES
"   11/16/19 1721 11/16/19 1728 11/16/19 1731   Device   Type HeartMate3 HeartMate3 HeartMate3   Flow 2.5 2.6 2.5   Speed 5300 5300 5300   PI 7.1 8.2 6.7   Power (Orellana) 3.6 3.6 3.5   LSL 4900 4900 4900     MD Mino at bedside to assess pt. Pt's LVAD alarming LOW FLOW. Pt reporting feeling lightheaded, nauseous, still "can feel my heart thumping in my chest". Orthostatics obtained; DP 58 laying down, DP 60 sitting and DP 40 upon standing. MD Mino ordered bedrest for pt, stated would place orders accordingly. Stated to hold all antihypertensives for now. Will implement. Aimee LVAD coordinator on call updated of events. Instructed pt to call if needed to get up, bed alarm placed for pt's safety. Will follow up. Will continue to monitor.  "

## 2019-11-17 NOTE — PROGRESS NOTES
[facility]  Heart Transplant  Progress Note    Patient Name: Deborah Navas  MRN: [id number]  Admission Date: 11/13/2019  Hospital Length of Stay: 2 days  Attending Physician: Vi Bryant MD  Primary Care Provider: Primary Doctor No  Principal Problem:<principal problem not specified>    Subjective:     Interval History: Orthostatics negative. No alarms but flow dropped to 2.5 few times this morning. Patient states she feels better this morning compared to yesterday. Received 500cc fluids after she had few low flows and positive orthostatics. Lasix stopped yesterday     Continuous Infusions:  Scheduled Meds:   amiodarone  400 mg Oral Daily    aspirin  325 mg Oral Daily    gabapentin  200 mg Oral BID    lisinopril  10 mg Oral BID    magnesium oxide  400 mg Oral Daily    mexiletine  150 mg Oral Q8H    mirtazapine  15 mg Oral QHS    pantoprazole  40 mg Oral Daily    spironolactone  12.5 mg Oral Daily    venlafaxine  150 mg Oral Daily    warfarin  1.5 mg Oral Daily     PRN Meds:ALPRAZolam, pneumoc 13-amber conj-dip cr(PF), promethazine, senna-docusate 8.6-50 mg, zolpidem    Review of patient's allergies indicates:   Allergen Reactions    Adhesive Blisters     Reaction to area in chest and up only    Codeine Itching     Objective:     Vital Signs (Most Recent):  Temp: 97.9 °F (36.6 °C) (11/17/19 0447)  Pulse: 73 (11/17/19 0706)  Resp: 18 (11/17/19 0806)  BP: (!) 82/0 (11/17/19 0806)  SpO2: 100 % (11/17/19 0806) Vital Signs (24h Range):  Temp:  [97.3 °F (36.3 °C)-98.1 °F (36.7 °C)] 97.9 °F (36.6 °C)  Pulse:  [] 73  Resp:  [18] 18  SpO2:  [96 %-100 %] 100 %  BP: (40-84)/(0) 82/0     Patient Vitals for the past 72 hrs (Last 3 readings):   Weight   11/17/19 0447 102.4 kg (225 lb 12 oz)   11/16/19 0600 100.9 kg (222 lb 7.1 oz)   11/15/19 0500 101 kg (222 lb 10.6 oz)     Body mass index is 35.36 kg/m².      Intake/Output Summary (Last 24 hours) at 11/17/2019 0840  Last data  filed at 11/17/2019 0600  Gross per 24 hour   Intake 1540 ml   Output 2250 ml   Net -710 ml       Hemodynamic Parameters:       Telemetry: NSR    Physical Exam  Constitutional: laying in bed NAD  Neck: Normal range of motion. Neck supple. JVP at clavicle   Cardiovascular: Normal rate, regular rhythm, Smooth VAD hum. No murmur heard.  Pulmonary/Chest: Effort normal. No stridor. No respiratory distress. She has decreased breath sounds in the left lower field. She has no wheezes. She has no rales.   Abdominal: Soft. Normal appearance, normal aorta and bowel sounds are normal. She exhibits no distension. There is no tenderness.   Musculoskeletal: Normal range of motion. She exhibits no edema or tenderness.   Neurological: She is alert and oriented to person, place, and time. She has normal strength and normal reflexes. Coordination normal.   Skin: Skin is warm and dry. No rash noted. No erythema  Significant Labs:  CBC:  Recent Labs   Lab 11/15/19  0428 11/16/19  0604 11/17/19  0345   WBC 4.52 3.99 4.22   RBC 4.41 4.68 4.62   HGB 10.9* 11.6* 11.5*   HCT 38.0 40.2 40.2    309 310   MCV 86 86 87   MCH 24.7* 24.8* 24.9*   MCHC 28.7* 28.9* 28.6*     BNP:  Recent Labs   Lab 11/13/19  1038 11/15/19  0428   * 207*     CMP:  Recent Labs   Lab 11/13/19  1038  11/15/19  0428  11/15/19  2222 11/16/19  0604 11/17/19  0344   *   < > 96   < > 114* 130*  130* 113*   CALCIUM 9.6   < > 9.4   < > 9.9 9.6  9.8 9.4   ALBUMIN 3.6  --  3.4*  --   --   --   --    PROT 7.2  --  7.2  --   --   --   --       < > 140   < > 137 139  140 137   K 3.9   < > 3.6   < > 5.4* 4.1  4.2 4.1   CO2 23   < > 25   < > 21* 24  23 19*      < > 102   < > 104 104  106 105   BUN 19   < > 21*   < > 26* 26*  26* 33*   CREATININE 1.7*   < > 1.7*   < > 1.8* 1.9*  1.8* 1.9*   ALKPHOS 92  --  91  --   --   --   --    ALT 16  --  12  --   --   --   --    AST 20  --  15  --   --   --   --    BILITOT 0.6  --  0.5  --   --   --   --      < > = values in this interval not displayed.      Coagulation:   Recent Labs   Lab 11/15/19  0428 11/16/19  0604 11/17/19  0345   INR 2.4* 2.2* 2.0*   APTT 34.1* 25.1 30.5     LDH:  Recent Labs   Lab 11/15/19  0428 11/16/19  0604 11/17/19  0345    223 220     Microbiology:  Microbiology Results (last 7 days)     ** No results found for the last 168 hours. **          I have reviewed all pertinent labs within the past 24 hours.    Estimated Creatinine Clearance: 43.6 mL/min (A) (based on SCr of 1.9 mg/dL (H)).    Diagnostic Results:  I have reviewed and interpreted all pertinent imaging results/findings within the past 24 hours.    Assessment and Plan:     48 yo WF with NICM, s/p HM3 implantation 9/10/19 (post up coarse uncomplicated with the exception of+ diaphragmatic stimulation of LV lead and pleural effusion with chest tube placement, atrial flutter with NADINE/DCCV), V-fib reported in setting of hypokalemia with appropriate shock from ICD on Amiodarone prior to LVAD placement; and recent hospitalizations for ventricular arrythmias; started on Mexilitine.  She was seen in EP clinic today and she continues to have multiple VT episodes with some ATP therapy, although no ICD shocks since starting mexiletine 10/24/2019. One slow VT episode 2.5hrs 11/3/2019. Patient asymptomatic with LVAD. On coumadin. No AFL since post-op event (paced out of rhythm 9/24/2019). CHB with 100% V pacing (LV lead off). She does not feel well. Dry heaving with mexiletine. Taking phenergan PRN. 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   She was seen in HTS clinic and reported 4 low flow alarms the last 4 nights.  She reports they occur in her sleep and last for only a few seconds.  By the time she wakes up; they quickly stop.  She does report to possibly being little volume overloaded.  She hasn't noticed weight gain at home but thinks she may have little extra fluid.  She denies SOB, chest pain,  palpitations, LEGER. In review of her records: she was 223lbs on 10/24/19 during previous hospitalization and is 235lbs now.  Her lasix had previously been decreased to prn.     Left ventricular assist device (LVAD) complication  -hx low flow alarms prior to admit and again the last 24 hours  -Possibly secondary to ADHF.  -Echo shows IVS shifting to right side and LVED increased to 6.4.  Formal report of CT unremarkable; however, it was a non contrast as creat was elevated above baseline on admit and inflow and outflow cannulars not accurately visualized    -BP  Low yesterday for which BP meds were held     Essential hypertension  -Patient is non-pulsatile  -Doppler goal 60-90 mmHg  -Antihypertensive medications include  Lisinopril,  -Hydralazine was started on evening of admit, but will change to low dose Norvasc and Spironolactone to aid with BP and low K+.  Hesitated on increasing Lisinopril because of active diuresis and concern for fluctuation of creatinine     Ventricular tachycardia  -Per EP office visit on day of admission: She continues to have multiple VT episodes with some ATP therapy, although no ICD shocks since starting mexiletine 10/24/2019. One slow VT episode 2.5hrs 11/3/2019. Patient asymptomatic with LVAD. On coumadin. No AFL since post-op event (paced out of rhythm 9/24/2019). CHB with 100% V pacing (LV lead off).   -Plan was to continue current regimen despite Mexiletine making her nauseous.  -Decreased Amiodarone to daily per EP plan on discharge last hospitalization   - No VT episodes since yesterday afternoon      LVAD (left ventricular assist device) present  -HeartMate 3 Implanted 9/10/19 as DT.  -Implanted by Dr. Walden  -INR slightly therapeutic. Goal INR 2.0-3.0. Continue Coumadin.  INR 2.0 .  Previous home dose was 3mg on Wed and 1.5mg QDaily  -Antiplatelets:  mg.  -LDH is stable Will monitor daily.   -Speed set at 5300;   -Not listed for OHTx: was declined due to pulmonary  hypertension and incomplete care giving plan    Procedure: Device Interrogation Including analysis of device parameters  Current Settings: Ventricular Assist Device  Review of device function is stable    TXP LVAD INTERROGATIONS 11/17/2019 11/16/2019 11/16/2019 11/16/2019 11/16/2019 11/16/2019 11/16/2019   Type HeartMate3 HeartMate3 HeartMate3 HeartMate3 HeartMate3 HeartMate3 -   Flow 3.5 3.1 3.5 2.5 2.6 2.5 2.4   Speed 5300 5100 5300 5300 5300 5300 5400   PI 4.1 4.1 3.6 6.7 8.2 7.1 8.6   Power (Orellana) 3.7 3.5 3.6 3.5 3.6 3.6 3.4   LSL - - 4900 4900 4900 4900 -   Pulsatility - - Intermittent pulse - - Intermittent pulse -       GLO on  CKD (chronic kidney disease)  -Baseline creat appears 1.3-1.6  - Cr 1.9 today. Received fluids. Will give 500 cc fluid and reassess cr tomorrow   - Will give some IV fluids     Acute on chronic combined systolic and diastolic heart failure  -NICM  -Last 2D Echo 11/13/19: LVEF 20%, LVEDD 6.3 cm with speed at 5300  -Diuresed Will on IVP Lasix and was planning on transitioning to po this am, but she still looks volume overloaded so will continue IV Lasix.   -2g Na dietary restriction, 1500 mL fluid restriction, strict I/Os      ICD (implantable cardioverter-defibrillator) in place  -[employer] ICD  -See above VT        Dandre Hunter MD  Heart Transplant  [facility]

## 2019-11-17 NOTE — PLAN OF CARE
Problem: Adult Inpatient Plan of Care  Goal: Plan of Care Review  LOC: alert and oriented x 4.   SKIN: The skin is warm, dry. LVAD dressing CDI.  RESPIRATORY: Respirations are WNL, even and unlabored. Normal effort and rate noted. No accessory muscle use noted. Pt. On room air.  CARDIAC: Patient runs a paced rhythm / LVAD rhythm on the monitor.   ABDOMEN: Soft / rounded and non tender to palpation. No distention noted.   URINARY: continent; up to toilet with assistance.  EXTREMITIES: Extremities are WNL; general weakness throughout.  Neuro: Pt able to follow commands and is calm and cooperative with care.      POC reviewed with patient. 2000 Doppler 62/0; 0000 doppler 78/0. 500 cc bolus finished at 2345. Other VSS. Patient free of injuries and falls. Patient has no c/o pain or discomfort. Patient on bedrest and up with assistance to use the bathroom. Patient had no low flow alarms over night. Lisinopril, hydralazine, and amlodipine held due to low dopplers / BP. Diuresis stopped due to low flows and low dopplers. All questions were addressed. Will continue to monitor.

## 2019-11-17 NOTE — PLAN OF CARE
Low flow alarms this afternoon. Positive orthostatic BP with lightheadedness and low flow alarm during maneuver. Pulse index high in setting of low flow. LVDD is 6.3 and may need to go up on speed. At this time she also has GLO and skin turgor with mild tenting, therefore, will give 500 ml NS in 5 hrs and reassess tomorrow regarding speed change vs backing off on BP meds. Discontinue Hydralazine and Amlodipine for now given +ortho

## 2019-11-17 NOTE — PLAN OF CARE
Pt free of falls, injury this shift. POC reviewed with pt at bedside, verbalized understanding. Lasix IVP d/c'd d/t Cr bump. Pt had several LF alarms today in setting of orthostatic vitals; 500 cc bolus started, to continue for 5 hours (finishes at midnight). Possible speed change tomorrow, given episodes of VT and LFs. Bedrest maintained d/t pt's lightheadedness, bed alarm placed for pt's safety. Instructed pt to call RN or staff if needed to get up, verbalized understanding. No distress noted. Will continue to monitor pt.

## 2019-11-17 NOTE — NURSING
Patient doppler 62/0. Informed Dr. Chau and asked about giving PM dose of lisinopril. MD order to hold lisinopril tonight. Will continue to monitor.

## 2019-11-17 NOTE — ASSESSMENT & PLAN NOTE
-Per EP office visit on day of admission: She continues to have multiple VT episodes with some ATP therapy, although no ICD shocks since starting mexiletine 10/24/2019. One slow VT episode 2.5hrs 11/3/2019. Patient asymptomatic with LVAD. On coumadin. No AFL since post-op event (paced out of rhythm 9/24/2019). CHB with 100% V pacing (LV lead off).   -Plan was to continue current regimen despite Mexiletine making her nauseous.  -Decreased Amiodarone to daily per EP plan on discharge last hospitalization   - No VT episodes since yesterday afternoon

## 2019-11-17 NOTE — PROGRESS NOTES
11/17/2019  Trevin Sow    Current provider:  Vi Bryant MD      I, Trevin Sow, rounded on Deborah Oliva Navas to ensure all mechanical assist device settings (IABP or VAD) were appropriate and all parameters were within limits.  I was able to ensure all back up equipment was present, the staff had no issues, and the Perfusion Department daily rounding was complete.    8:20 AM

## 2019-11-17 NOTE — ASSESSMENT & PLAN NOTE
-HeartMate 3 Implanted 9/10/19 as DT.  -Implanted by Dr. Walden  -INR slightly therapeutic. Goal INR 2.0-3.0. Continue Coumadin.  INR 2.0 . Will give her 3 mg today. Previous home dose was 3mg on Wed and 1.5mg QDaily  -Antiplatelets:  mg.  -LDH is stable Will monitor daily.   -Speed set at 5300;   -Not listed for OHTx: was declined due to pulmonary hypertension and incomplete care giving plan    Procedure: Device Interrogation Including analysis of device parameters  Current Settings: Ventricular Assist Device  Review of device function is stable    TXP LVAD INTERROGATIONS 11/17/2019 11/16/2019 11/16/2019 11/16/2019 11/16/2019 11/16/2019 11/16/2019   Type HeartMate3 HeartMate3 HeartMate3 HeartMate3 HeartMate3 HeartMate3 -   Flow 3.5 3.1 3.5 2.5 2.6 2.5 2.4   Speed 5300 5100 5300 5300 5300 5300 5400   PI 4.1 4.1 3.6 6.7 8.2 7.1 8.6   Power (Orellana) 3.7 3.5 3.6 3.5 3.6 3.6 3.4   LSL - - 4900 4900 4900 4900 -   Pulsatility - - Intermittent pulse - - Intermittent pulse -

## 2019-11-17 NOTE — ASSESSMENT & PLAN NOTE
-hx low flow alarms prior to admit and again the last 24 hours  -Possibly secondary to ADHF.  -Echo shows IVS shifting to right side and LVED increased to 6.4.  Formal report of CT unremarkable; however, it was a non contrast as creat was elevated above baseline on admit and inflow and outflow cannulars not accurately visualized    -BP  Low yesterday for which BP meds were held

## 2019-11-18 LAB
ALBUMIN SERPL BCP-MCNC: 3.6 G/DL (ref 3.5–5.2)
ALP SERPL-CCNC: 93 U/L (ref 55–135)
ALT SERPL W/O P-5'-P-CCNC: 13 U/L (ref 10–44)
ANION GAP SERPL CALC-SCNC: 10 MMOL/L (ref 8–16)
APTT BLDCRRT: 29.8 SEC (ref 21–32)
AST SERPL-CCNC: 17 U/L (ref 10–40)
BASOPHILS # BLD AUTO: 0.07 K/UL (ref 0–0.2)
BASOPHILS NFR BLD: 1.5 % (ref 0–1.9)
BILIRUB DIRECT SERPL-MCNC: 0.2 MG/DL (ref 0.1–0.3)
BILIRUB SERPL-MCNC: 0.4 MG/DL (ref 0.1–1)
BNP SERPL-MCNC: 331 PG/ML (ref 0–99)
BUN SERPL-MCNC: 34 MG/DL (ref 6–20)
CALCIUM SERPL-MCNC: 9.7 MG/DL (ref 8.7–10.5)
CHLORIDE SERPL-SCNC: 103 MMOL/L (ref 95–110)
CO2 SERPL-SCNC: 24 MMOL/L (ref 23–29)
CREAT SERPL-MCNC: 1.7 MG/DL (ref 0.5–1.4)
CRP SERPL-MCNC: 6 MG/L (ref 0–8.2)
DIFFERENTIAL METHOD: ABNORMAL
EOSINOPHIL # BLD AUTO: 0.2 K/UL (ref 0–0.5)
EOSINOPHIL NFR BLD: 3.8 % (ref 0–8)
ERYTHROCYTE [DISTWIDTH] IN BLOOD BY AUTOMATED COUNT: 16.8 % (ref 11.5–14.5)
EST. GFR  (AFRICAN AMERICAN): 40 ML/MIN/1.73 M^2
EST. GFR  (NON AFRICAN AMERICAN): 34.7 ML/MIN/1.73 M^2
GLUCOSE SERPL-MCNC: 111 MG/DL (ref 70–110)
HCT VFR BLD AUTO: 42.1 % (ref 37–48.5)
HGB BLD-MCNC: 12 G/DL (ref 12–16)
IMM GRANULOCYTES # BLD AUTO: 0.01 K/UL (ref 0–0.04)
IMM GRANULOCYTES NFR BLD AUTO: 0.2 % (ref 0–0.5)
INR PPP: 1.8 (ref 0.8–1.2)
LDH SERPL L TO P-CCNC: 222 U/L (ref 110–260)
LYMPHOCYTES # BLD AUTO: 1.3 K/UL (ref 1–4.8)
LYMPHOCYTES NFR BLD: 28.4 % (ref 18–48)
MAGNESIUM SERPL-MCNC: 2.2 MG/DL (ref 1.6–2.6)
MCH RBC QN AUTO: 24.6 PG (ref 27–31)
MCHC RBC AUTO-ENTMCNC: 28.5 G/DL (ref 32–36)
MCV RBC AUTO: 86 FL (ref 82–98)
MONOCYTES # BLD AUTO: 0.2 K/UL (ref 0.3–1)
MONOCYTES NFR BLD: 5.1 % (ref 4–15)
NEUTROPHILS # BLD AUTO: 2.9 K/UL (ref 1.8–7.7)
NEUTROPHILS NFR BLD: 61 % (ref 38–73)
NRBC BLD-RTO: 0 /100 WBC
PHOSPHATE SERPL-MCNC: 3.3 MG/DL (ref 2.7–4.5)
PLATELET # BLD AUTO: 324 K/UL (ref 150–350)
PMV BLD AUTO: 9.6 FL (ref 9.2–12.9)
POTASSIUM SERPL-SCNC: 4 MMOL/L (ref 3.5–5.1)
PREALB SERPL-MCNC: 29 MG/DL (ref 20–43)
PROT SERPL-MCNC: 7.3 G/DL (ref 6–8.4)
PROTHROMBIN TIME: 17.9 SEC (ref 9–12.5)
RBC # BLD AUTO: 4.87 M/UL (ref 4–5.4)
SODIUM SERPL-SCNC: 137 MMOL/L (ref 136–145)
WBC # BLD AUTO: 4.68 K/UL (ref 3.9–12.7)

## 2019-11-18 PROCEDURE — 93750 INTERROGATION VAD IN PERSON: CPT | Mod: NTX,,, | Performed by: INTERNAL MEDICINE

## 2019-11-18 PROCEDURE — 25000003 PHARM REV CODE 250: Mod: NTX | Performed by: INTERNAL MEDICINE

## 2019-11-18 PROCEDURE — 85025 COMPLETE CBC W/AUTO DIFF WBC: CPT | Mod: NTX

## 2019-11-18 PROCEDURE — 84100 ASSAY OF PHOSPHORUS: CPT | Mod: NTX

## 2019-11-18 PROCEDURE — 93451 PR RIGHT HEART CATH O2 SATURATION & CARDIAC OUTPUT: ICD-10-PCS | Mod: 26,NTX,, | Performed by: INTERNAL MEDICINE

## 2019-11-18 PROCEDURE — 36415 COLL VENOUS BLD VENIPUNCTURE: CPT | Mod: NTX

## 2019-11-18 PROCEDURE — 83615 LACTATE (LD) (LDH) ENZYME: CPT | Mod: NTX

## 2019-11-18 PROCEDURE — 80076 HEPATIC FUNCTION PANEL: CPT | Mod: NTX

## 2019-11-18 PROCEDURE — 99233 SBSQ HOSP IP/OBS HIGH 50: CPT | Mod: 25,NTX,, | Performed by: INTERNAL MEDICINE

## 2019-11-18 PROCEDURE — 83880 ASSAY OF NATRIURETIC PEPTIDE: CPT | Mod: NTX

## 2019-11-18 PROCEDURE — 99233 PR SUBSEQUENT HOSPITAL CARE,LEVL III: ICD-10-PCS | Mod: 25,NTX,, | Performed by: INTERNAL MEDICINE

## 2019-11-18 PROCEDURE — 85610 PROTHROMBIN TIME: CPT | Mod: NTX

## 2019-11-18 PROCEDURE — 25000003 PHARM REV CODE 250: Mod: NTX | Performed by: STUDENT IN AN ORGANIZED HEALTH CARE EDUCATION/TRAINING PROGRAM

## 2019-11-18 PROCEDURE — 27000248 HC VAD-ADDITIONAL DAY: Mod: NTX

## 2019-11-18 PROCEDURE — 25000003 PHARM REV CODE 250: Mod: NTX | Performed by: PHYSICIAN ASSISTANT

## 2019-11-18 PROCEDURE — C1751 CATH, INF, PER/CENT/MIDLINE: HCPCS | Mod: NTX | Performed by: INTERNAL MEDICINE

## 2019-11-18 PROCEDURE — 84134 ASSAY OF PREALBUMIN: CPT | Mod: NTX

## 2019-11-18 PROCEDURE — 86140 C-REACTIVE PROTEIN: CPT | Mod: NTX

## 2019-11-18 PROCEDURE — 93451 RIGHT HEART CATH: CPT | Mod: 26,NTX,, | Performed by: INTERNAL MEDICINE

## 2019-11-18 PROCEDURE — 83735 ASSAY OF MAGNESIUM: CPT | Mod: NTX

## 2019-11-18 PROCEDURE — 80048 BASIC METABOLIC PNL TOTAL CA: CPT | Mod: NTX

## 2019-11-18 PROCEDURE — 93750 PR INTERROGATE VENT ASSIST DEV, IN PERSON, W PHYSICIAN ANALYSIS: ICD-10-PCS | Mod: NTX,,, | Performed by: INTERNAL MEDICINE

## 2019-11-18 PROCEDURE — 20600001 HC STEP DOWN PRIVATE ROOM: Mod: NTX

## 2019-11-18 PROCEDURE — C1894 INTRO/SHEATH, NON-LASER: HCPCS | Mod: NTX | Performed by: INTERNAL MEDICINE

## 2019-11-18 PROCEDURE — 85730 THROMBOPLASTIN TIME PARTIAL: CPT | Mod: NTX

## 2019-11-18 PROCEDURE — 93451 RIGHT HEART CATH: CPT | Mod: NTX | Performed by: INTERNAL MEDICINE

## 2019-11-18 PROCEDURE — 63600175 PHARM REV CODE 636 W HCPCS: Mod: NTX | Performed by: INTERNAL MEDICINE

## 2019-11-18 RX ORDER — LIDOCAINE HYDROCHLORIDE 20 MG/ML
INJECTION, SOLUTION EPIDURAL; INFILTRATION; INTRACAUDAL; PERINEURAL
Status: DISCONTINUED | OUTPATIENT
Start: 2019-11-18 | End: 2019-11-18 | Stop reason: HOSPADM

## 2019-11-18 RX ORDER — POLYETHYLENE GLYCOL 3350 17 G/17G
17 POWDER, FOR SOLUTION ORAL ONCE
Status: COMPLETED | OUTPATIENT
Start: 2019-11-18 | End: 2019-11-18

## 2019-11-18 RX ORDER — WARFARIN 3 MG/1
3 TABLET ORAL ONCE
Status: COMPLETED | OUTPATIENT
Start: 2019-11-18 | End: 2019-11-18

## 2019-11-18 RX ORDER — SODIUM CHLORIDE 9 MG/ML
INJECTION, SOLUTION INTRAVENOUS
Status: DISCONTINUED | OUTPATIENT
Start: 2019-11-18 | End: 2019-12-03

## 2019-11-18 RX ADMIN — AMIODARONE HYDROCHLORIDE 400 MG: 200 TABLET ORAL at 08:11

## 2019-11-18 RX ADMIN — MEXILETINE HYDROCHLORIDE 150 MG: 150 CAPSULE ORAL at 06:11

## 2019-11-18 RX ADMIN — VENLAFAXINE 150 MG: 37.5 TABLET ORAL at 08:11

## 2019-11-18 RX ADMIN — ZOLPIDEM TARTRATE 5 MG: 5 TABLET ORAL at 09:11

## 2019-11-18 RX ADMIN — ZOLPIDEM TARTRATE 5 MG: 5 TABLET ORAL at 12:11

## 2019-11-18 RX ADMIN — SENNOSIDES AND DOCUSATE SODIUM 2 TABLET: 8.6; 5 TABLET ORAL at 10:11

## 2019-11-18 RX ADMIN — GABAPENTIN 200 MG: 100 CAPSULE ORAL at 08:11

## 2019-11-18 RX ADMIN — WARFARIN SODIUM 3 MG: 3 TABLET ORAL at 04:11

## 2019-11-18 RX ADMIN — MAGNESIUM OXIDE TAB 400 MG (241.3 MG ELEMENTAL MG) 400 MG: 400 (241.3 MG) TAB at 08:11

## 2019-11-18 RX ADMIN — POLYETHYLENE GLYCOL (3350) 17 G: 17 POWDER, FOR SOLUTION ORAL at 10:11

## 2019-11-18 RX ADMIN — LISINOPRIL 10 MG: 10 TABLET ORAL at 08:11

## 2019-11-18 RX ADMIN — LISINOPRIL 10 MG: 10 TABLET ORAL at 09:11

## 2019-11-18 RX ADMIN — ASPIRIN 325 MG: 325 TABLET, DELAYED RELEASE ORAL at 08:11

## 2019-11-18 RX ADMIN — MEXILETINE HYDROCHLORIDE 150 MG: 150 CAPSULE ORAL at 09:11

## 2019-11-18 RX ADMIN — GABAPENTIN 200 MG: 100 CAPSULE ORAL at 09:11

## 2019-11-18 RX ADMIN — MEXILETINE HYDROCHLORIDE 150 MG: 150 CAPSULE ORAL at 01:11

## 2019-11-18 RX ADMIN — SPIRONOLACTONE 12.5 MG: 25 TABLET, FILM COATED ORAL at 08:11

## 2019-11-18 RX ADMIN — MIRTAZAPINE 15 MG: 15 TABLET, FILM COATED ORAL at 09:11

## 2019-11-18 RX ADMIN — PANTOPRAZOLE SODIUM 40 MG: 40 TABLET, DELAYED RELEASE ORAL at 08:11

## 2019-11-18 NOTE — PLAN OF CARE
Pt free of falls, injury this shift. POC reviewed with pt at bedside, verbalized understanding. Pt's BP improved after 500 cc bolus overnight, another 500cc bolus given today. Plan is for RHC in AM. Fall precautions remain in place. Instructed pt to call RN or staff if needed to get up, verbalized understanding. No distress noted. Will continue to monitor pt.

## 2019-11-18 NOTE — NURSING
Pt sleeping with family at bedside. No LFA in the past 24 hours. Mother states that pt still gets dizzy when standing. Follow-up waveforms collected and sent to [facility] for review. Plan for RHC today.

## 2019-11-18 NOTE — NURSING TRANSFER
Nursing Transfer Note      11/18/2019     Transfer to cath lab    Transfer via stretcher    Transfer with telemetry    Transported by nurse    Medicines sent: none

## 2019-11-18 NOTE — ASSESSMENT & PLAN NOTE
-hx low flow alarms prior to admit, last LFA 11/16   -Possibly secondary to ADHF.  -Echo shows IVS shifting to right side and LVED increased to 6.4.    - Formal report of CT unremarkable; however, it was a non contrast as creat was elevated above baseline on admit and inflow and outflow cannulars not accurately visualized

## 2019-11-18 NOTE — PLAN OF CARE
LOC: alert and oriented x 4.   SKIN: The skin is warm, dry. LVAD dressing CDI.  RESPIRATORY: Respirations are WNL, even and unlabored. Normal effort and rate noted. No accessory muscle use noted. Pt. On room air.  CARDIAC: Patient runs a paced rhythm / LVAD rhythm on the monitor.   ABDOMEN: Soft / rounded and non tender to palpation. No distention noted.   URINARY: continent; up to toilet.  EXTREMITIES: Extremities are WNL; general weakness throughout.  Neuro: Pt able to follow commands and is calm and cooperative with care.       POC reviewed with patient. VSS; dopplers in the 80's. Patient free of injuries and falls. Patient has no c/o pain or discomfort. Patient going for RHC on 11/18. No low flow alarms over night. All questions were addressed. Will continue to monitor.

## 2019-11-18 NOTE — PROGRESS NOTES
11/18/2019  Christo Cooper    Current provider:  Vi Bryant MD      I, Christo Cooper, rounded on Deborah Navas to ensure all mechanical assist device settings (IABP or VAD) were appropriate and all parameters were within limits.  I was able to ensure all back up equipment was present, the staff had no issues, and the Perfusion Department daily rounding was complete.    7:48 AM

## 2019-11-18 NOTE — SUBJECTIVE & OBJECTIVE
Interval History: No low flow alarms since Saturday. Feels well, slept well overnight. RHC today. Denies dizziness, lightheadedness.     Scheduled Meds:   amiodarone  400 mg Oral Daily    aspirin  325 mg Oral Daily    gabapentin  200 mg Oral BID    lisinopril  10 mg Oral BID    magnesium oxide  400 mg Oral Daily    mexiletine  150 mg Oral Q8H    mirtazapine  15 mg Oral QHS    pantoprazole  40 mg Oral Daily    spironolactone  12.5 mg Oral Daily    venlafaxine  150 mg Oral Daily    warfarin  3 mg Oral Once     PRN Meds:ALPRAZolam, pneumoc 13-amber conj-dip cr(PF), promethazine, senna-docusate 8.6-50 mg, zolpidem    Review of patient's allergies indicates:   Allergen Reactions    Adhesive Blisters     Reaction to area in chest and up only    Codeine Itching     Objective:     Vital Signs (Most Recent):  Temp: 98 °F (36.7 °C) (11/18/19 0851)  Pulse: 70 (11/18/19 0851)  Resp: 18 (11/18/19 0851)  BP: (!) 62/0 (11/18/19 0851)  SpO2: 96 % (11/18/19 0851) Vital Signs (24h Range):  Temp:  [97.7 °F (36.5 °C)-98.4 °F (36.9 °C)] 98 °F (36.7 °C)  Pulse:  [70-94] 70  Resp:  [18] 18  SpO2:  [96 %-98 %] 96 %  BP: (62-90)/(0) 62/0     Patient Vitals for the past 72 hrs (Last 3 readings):   Weight   11/18/19 0500 103.7 kg (228 lb 9.9 oz)   11/17/19 0447 102.4 kg (225 lb 12 oz)   11/16/19 0600 100.9 kg (222 lb 7.1 oz)     Body mass index is 35.81 kg/m².      Intake/Output Summary (Last 24 hours) at 11/18/2019 1008  Last data filed at 11/18/2019 0900  Gross per 24 hour   Intake 2286.67 ml   Output 1200 ml   Net 1086.67 ml     Hemodynamic Parameters:    Telemetry: NSR    Physical Exam    Constitutional: laying in bed NAD  Neck: Normal range of motion. Neck supple. JVP at clavicle   Cardiovascular: Normal rate, regular rhythm, Smooth VAD hum. No murmur heard.  Pulmonary/Chest: Effort normal. No stridor. No respiratory distress. She has decreased breath sounds in the left lower field. She has no wheezes. She has no rales.    Abdominal: Soft. Normal appearance, normal aorta and bowel sounds are normal. She exhibits no distension. There is no tenderness.   Musculoskeletal: Normal range of motion. She exhibits no edema or tenderness.   Neurological: She is alert and oriented to person, place, and time. She has normal strength and normal reflexes. Coordination normal.   Skin: Skin is warm and dry. No rash noted. No erythema      Significant Labs:  CBC:  Recent Labs   Lab 11/16/19  0604 11/17/19  0345 11/18/19 0412   WBC 3.99 4.22 4.68   RBC 4.68 4.62 4.87   HGB 11.6* 11.5* 12.0   HCT 40.2 40.2 42.1    310 324   MCV 86 87 86   MCH 24.8* 24.9* 24.6*   MCHC 28.9* 28.6* 28.5*     BNP:  Recent Labs   Lab 11/13/19  1038 11/15/19  0428 11/18/19  0412   * 207* 331*     CMP:  Recent Labs   Lab 11/13/19  1038  11/15/19  0428  11/16/19  0604 11/17/19  0344 11/18/19  0411 11/18/19  0412   *   < > 96   < > 130*  130* 113* 111*  --    CALCIUM 9.6   < > 9.4   < > 9.6  9.8 9.4 9.7  --    ALBUMIN 3.6  --  3.4*  --   --   --   --  3.6   PROT 7.2  --  7.2  --   --   --   --  7.3      < > 140   < > 139  140 137 137  --    K 3.9   < > 3.6   < > 4.1  4.2 4.1 4.0  --    CO2 23   < > 25   < > 24  23 19* 24  --       < > 102   < > 104  106 105 103  --    BUN 19   < > 21*   < > 26*  26* 33* 34*  --    CREATININE 1.7*   < > 1.7*   < > 1.9*  1.8* 1.9* 1.7*  --    ALKPHOS 92  --  91  --   --   --   --  93   ALT 16  --  12  --   --   --   --  13   AST 20  --  15  --   --   --   --  17   BILITOT 0.6  --  0.5  --   --   --   --  0.4    < > = values in this interval not displayed.      Coagulation:   Recent Labs   Lab 11/16/19 0604 11/17/19 0345 11/18/19 0411   INR 2.2* 2.0* 1.8*   APTT 25.1 30.5 29.8     LDH:  Recent Labs   Lab 11/16/19 0604 11/17/19 0345 11/18/19 0411    220 222     Microbiology:  Microbiology Results (last 7 days)     ** No results found for the last 168 hours. **          I have reviewed all  pertinent labs within the past 24 hours.    Estimated Creatinine Clearance: 49 mL/min (A) (based on SCr of 1.7 mg/dL (H)).    Diagnostic Results:  I have reviewed and interpreted all pertinent imaging results/findings within the past 24 hours.

## 2019-11-18 NOTE — ASSESSMENT & PLAN NOTE
-Patient is non-pulsatile  -Doppler goal 60-90 mmHg  -Antihypertensive medications include  Lisinopril  -Hydralazine was started on evening of admit and then changed to low dose Norvasc and Spironolactone to aid with BP and low K+.  Hesitated on increasing Lisinopril because of active diuresis and concern for fluctuation of creatinine    - amlodipine now on hold in setting of symptomatic hypotension and LFAs on 11/16

## 2019-11-18 NOTE — NURSING
Page received from HELEN Valiente. Pt with LFA, lightheadedness and nausea. HoTN with orthostatics. MD Rachel is aware. Holding antihypertensives and fluids to be ordered.

## 2019-11-18 NOTE — ASSESSMENT & PLAN NOTE
-HeartMate 3 Implanted 9/10/19 as DT.  -Implanted by Dr. Walden  -INR sub therapeutic. Goal INR 2.0-3.0. Continue Coumadin.  INR 1.8. Boost tonight- hold off of heparin bridge for now.    - Previous home dose was 3mg on Wed and 1.5mg QDaily  -Antiplatelets:  mg.  -LDH is stable Will monitor daily.   -Speed set at 5300;   -Not listed for OHTx: was declined due to pulmonary hypertension and incomplete care giving plan    Procedure: Device Interrogation Including analysis of device parameters  Current Settings: Ventricular Assist Device  Review of device function is stable    TXP LVAD INTERROGATIONS 11/18/2019 11/17/2019 11/17/2019 11/17/2019 11/16/2019 11/16/2019 11/16/2019   Type HeartMate3 HeartMate3 HeartMate3 HeartMate3 HeartMate3 HeartMate3 HeartMate3   Flow 4.0 3.6 3.8 3.5 3.1 3.5 2.5   Speed 5300 5300 5300 5300 5100 5300 5300   PI 3.5 4.4 3.3 4.1 4.1 3.6 6.7   Power (Orellana) 3.8 3.7 3.8 3.7 3.5 3.6 3.5   LSL 4900 4900 4900 4900 - 4900 4900   Pulsatility Intermittent pulse Intermittent pulse Intermittent pulse Intermittent pulse - Intermittent pulse -

## 2019-11-18 NOTE — PROGRESS NOTES
Update Note    SWI to see pt for update. Pt presented as aaox4 with pleasant affect. Pt reports coping well and denies further needs, questions, concerns at this time and none indicated. Providing ongoing psychosocial and counseling support, education, resources, assistance and discharge planning as indicated. Following and available.

## 2019-11-18 NOTE — NURSING
"LVAD DLES dressing changed under sterile technique using soap and water. DLES is a "1", mild redness noted around site, no tenderness reported. Scant amount of yellow drainage noted around site, pt states drainage has been consistent since implant in September. No bleeding noted. Pt tolerated well, no complication. Dressing due to be changed 11/18/19.   "

## 2019-11-18 NOTE — PLAN OF CARE
[facility]              Heart Transplant/VAD Clinic   [address] [place] [address]              [phone] after hours          [phone] fax                 VAD HOME  HEALTH ORDERS        Admit to Home Health     Diagnosis:       Patient Active Problem List   Diagnosis    Knee pain    Pes anserine bursitis    ICD (implantable cardioverter-defibrillator) in place    Ventricular fibrillation    Congestive cardiomyopathy    History of ventricular fibrillation    History of ventricular tachycardia    Heart transplant candidate    Enlarged thyroid    Abnormal CT scan    CKD (chronic kidney disease)    Chronic systolic congestive heart failure    LVAD (left ventricular assist device) present    Pleural effusion    Anticoagulation monitoring, INR range 2-3    Long term (current) use of anticoagulants    Ventricular tachycardia    Essential hypertension         Patient is homebound due to:  NYHA Class IV HF. S/P LVAD placement.      Diet: Low Fat, Low cholesterol, 2Gm Na, Coumadin restrictions.     Acitivities: No Swimming, bathing, vacuuming, contact sports.     Fresh implants= Sternal Precautions     Nursing:   SN to complete comprehensive assessment including routine vital signs. Instruct on disease process and s/s of complications to report to MD. Review/verify medication list sent home with the patient at time of discharge  and instruct patient/caregiver as needed. Frequency may be adjusted depending on start of care date.     **LVAD driveline exit site dressing change is to be completed per LVAD patient/caregiver only**.     Notify MD if:  SBP > 120 or < 80;   MAP > 80 or < 65;   HR > 120 or < 60;   Temp > 101;   Weight gain >3lbs in 1 day or 5lbs in 1 week.     LABS:  SN to perform labs: PT/INR per Coumadin clinic [phone].   Follow up INR date: 11/18/2019  No Finger Sticks     CONSULTS:       Physical Therapy to evaluate and treat.  Evaluate for home safety and equipment needs; Establish/upgrade home exercise program. Perform / instruct on therapeutic exercises, gait training, transfer training, and Range of Motion.     Occupational Therapy to evaluate and treat. Evaluate home environment for safety and equipment needs. Perform/Instruct on transfers, ADL training, ROM, and therapeutic exercises.     Aide to provide assistance with personal care, ADLs, and vital signs     Send initial Home Health orders to HTS attending physician on call.  Send follow up questions to VAD clinic MD [phone] or [phone].

## 2019-11-18 NOTE — PLAN OF CARE
Patient AAOx4. Family is at the bedside assisting with care. Low flow alarm this shift. Patient is scheduled for a R heart cath today. Possible d/c tomorrow. Plan of care discussed with patient.  Patient ambulating independently, fall precautions in place. LVAD DP and numbers WNL, smooth LVAD hum. Patient is gets daily dressing changes with soap and water. She stated that she would prefer to have her dressing change done later in the day.  Patient has no complaints of pain. Discussed medications and care. Patient has no questions at this time. Will continue to monitor.

## 2019-11-18 NOTE — ASSESSMENT & PLAN NOTE
- Per EP office visit on day of admission: She continues to have multiple VT episodes with some ATP therapy, although no ICD shocks since starting mexiletine 10/24/2019. One slow VT episode 2.5hrs 11/3/2019. Patient asymptomatic with LVAD. On coumadin. No AFL since post-op event (paced out of rhythm 9/24/2019). CHB with 100% V pacing (LV lead off).   - Plan was to continue current regimen despite Mexiletine making her nauseous.  - Decreased Amiodarone to daily per EP plan on discharge last hospitalization   - NSVT continues intermittently on tele

## 2019-11-18 NOTE — NURSING TRANSFER
Nursing Transfer Note      11/18/2019     Transfer from cath lab    Transfer via stretcher    Transfer with telemetry    Transported by nurse    Medicines sent: none    Patient reassessed at: 11/18/2019 17:40    Upon arrival to floor: VSS. No distress noted. Right IJ dressing is to stay on for 24 hours.

## 2019-11-18 NOTE — PROGRESS NOTES
[facility]  Heart Transplant  Progress Note    Patient Name: Deborah Navas  MRN: [id number]  Admission Date: 11/13/2019  Hospital Length of Stay: 3 days  Attending Physician: Vi Bryant MD  Primary Care Provider: Primary Doctor No  Principal Problem:Left ventricular assist device (LVAD) complication    Subjective:     Interval History: No low flow alarms since Saturday. Feels well, slept well overnight. RHC today. Denies dizziness, lightheadedness.     Scheduled Meds:   amiodarone  400 mg Oral Daily    aspirin  325 mg Oral Daily    gabapentin  200 mg Oral BID    lisinopril  10 mg Oral BID    magnesium oxide  400 mg Oral Daily    mexiletine  150 mg Oral Q8H    mirtazapine  15 mg Oral QHS    pantoprazole  40 mg Oral Daily    spironolactone  12.5 mg Oral Daily    venlafaxine  150 mg Oral Daily    warfarin  3 mg Oral Once     PRN Meds:ALPRAZolam, pneumoc 13-amber conj-dip cr(PF), promethazine, senna-docusate 8.6-50 mg, zolpidem    Review of patient's allergies indicates:   Allergen Reactions    Adhesive Blisters     Reaction to area in chest and up only    Codeine Itching     Objective:     Vital Signs (Most Recent):  Temp: 98 °F (36.7 °C) (11/18/19 0851)  Pulse: 70 (11/18/19 0851)  Resp: 18 (11/18/19 0851)  BP: (!) 62/0 (11/18/19 0851)  SpO2: 96 % (11/18/19 0851) Vital Signs (24h Range):  Temp:  [97.7 °F (36.5 °C)-98.4 °F (36.9 °C)] 98 °F (36.7 °C)  Pulse:  [70-94] 70  Resp:  [18] 18  SpO2:  [96 %-98 %] 96 %  BP: (62-90)/(0) 62/0     Patient Vitals for the past 72 hrs (Last 3 readings):   Weight   11/18/19 0500 103.7 kg (228 lb 9.9 oz)   11/17/19 0447 102.4 kg (225 lb 12 oz)   11/16/19 0600 100.9 kg (222 lb 7.1 oz)     Body mass index is 35.81 kg/m².      Intake/Output Summary (Last 24 hours) at 11/18/2019 1008  Last data filed at 11/18/2019 0900  Gross per 24 hour   Intake 2286.67 ml   Output 1200 ml   Net 1086.67 ml     Hemodynamic Parameters:    Telemetry:  NSR    Physical Exam    Constitutional: laying in bed NAD  Neck: Normal range of motion. Neck supple. JVP at clavicle   Cardiovascular: Normal rate, regular rhythm, Smooth VAD hum. No murmur heard.  Pulmonary/Chest: Effort normal. No stridor. No respiratory distress. She has decreased breath sounds in the left lower field. She has no wheezes. She has no rales.   Abdominal: Soft. Normal appearance, normal aorta and bowel sounds are normal. She exhibits no distension. There is no tenderness.   Musculoskeletal: Normal range of motion. She exhibits no edema or tenderness.   Neurological: She is alert and oriented to person, place, and time. She has normal strength and normal reflexes. Coordination normal.   Skin: Skin is warm and dry. No rash noted. No erythema      Significant Labs:  CBC:  Recent Labs   Lab 11/16/19  0604 11/17/19  0345 11/18/19 0412   WBC 3.99 4.22 4.68   RBC 4.68 4.62 4.87   HGB 11.6* 11.5* 12.0   HCT 40.2 40.2 42.1    310 324   MCV 86 87 86   MCH 24.8* 24.9* 24.6*   MCHC 28.9* 28.6* 28.5*     BNP:  Recent Labs   Lab 11/13/19  1038 11/15/19  0428 11/18/19  0412   * 207* 331*     CMP:  Recent Labs   Lab 11/13/19  1038  11/15/19  0428  11/16/19  0604 11/17/19  0344 11/18/19  0411 11/18/19  0412   *   < > 96   < > 130*  130* 113* 111*  --    CALCIUM 9.6   < > 9.4   < > 9.6  9.8 9.4 9.7  --    ALBUMIN 3.6  --  3.4*  --   --   --   --  3.6   PROT 7.2  --  7.2  --   --   --   --  7.3      < > 140   < > 139  140 137 137  --    K 3.9   < > 3.6   < > 4.1  4.2 4.1 4.0  --    CO2 23   < > 25   < > 24  23 19* 24  --       < > 102   < > 104  106 105 103  --    BUN 19   < > 21*   < > 26*  26* 33* 34*  --    CREATININE 1.7*   < > 1.7*   < > 1.9*  1.8* 1.9* 1.7*  --    ALKPHOS 92  --  91  --   --   --   --  93   ALT 16  --  12  --   --   --   --  13   AST 20  --  15  --   --   --   --  17   BILITOT 0.6  --  0.5  --   --   --   --  0.4    < > = values in this interval not  displayed.      Coagulation:   Recent Labs   Lab 11/16/19  0604 11/17/19  0345 11/18/19  0411   INR 2.2* 2.0* 1.8*   APTT 25.1 30.5 29.8     LDH:  Recent Labs   Lab 11/16/19  0604 11/17/19  0345 11/18/19  0411    220 222     Microbiology:  Microbiology Results (last 7 days)     ** No results found for the last 168 hours. **          I have reviewed all pertinent labs within the past 24 hours.    Estimated Creatinine Clearance: 49 mL/min (A) (based on SCr of 1.7 mg/dL (H)).    Diagnostic Results:  I have reviewed and interpreted all pertinent imaging results/findings within the past 24 hours.    Assessment and Plan:     48 yo WF with NICM, s/p HM3 implantation 9/10/19 (post up coarse uncomplicated with the exception of+ diaphragmatic stimulation of LV lead and pleural effusion with chest tube placement, atrial flutter with NADINE/DCCV), V-fib reported in setting of hypokalemia with appropriate shock from ICD on Amiodarone prior to LVAD placement; and recent hospitalizations for ventricular arrythmias; started on Mexilitine.  She was seen in EP clinic today and she continues to have multiple VT episodes with some ATP therapy, although no ICD shocks since starting mexiletine 10/24/2019. One slow VT episode 2.5hrs 11/3/2019. Patient asymptomatic with LVAD. On coumadin. No AFL since post-op event (paced out of rhythm 9/24/2019). CHB with 100% V pacing (LV lead off). She does not feel well. Dry heaving with mexiletine. Taking phenergan PRN. 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   She was seen in HTS clinic and reported 4 low flow alarms the last 4 nights.  She reports they occur in her sleep and last for only a few seconds.  By the time she wakes up; they quickly stop.  She does report to possibly being little volume overloaded.  She hasn't noticed weight gain at home but thinks she may have little extra fluid.  She denies SOB, chest pain, palpitations, LEGER. In review of her  records: she was 223lbs on 10/24/19 during previous hospitalization and is 235lbs now.  Her lasix had previously been decreased to prn.     * Left ventricular assist device (LVAD) complication  -hx low flow alarms prior to admit, last LFA 11/16   -Possibly secondary to ADHF.  -Echo shows IVS shifting to right side and LVED increased to 6.4.    - Formal report of CT unremarkable; however, it was a non contrast as creat was elevated above baseline on admit and inflow and outflow cannulars not accurately visualized     Essential hypertension  -Patient is non-pulsatile  -Doppler goal 60-90 mmHg  -Antihypertensive medications include  Lisinopril  -Hydralazine was started on evening of admit and then changed to low dose Norvasc and Spironolactone to aid with BP and low K+.  Hesitated on increasing Lisinopril because of active diuresis and concern for fluctuation of creatinine    - amlodipine now on hold in setting of symptomatic hypotension and LFAs on 11/16    Ventricular tachycardia  - Per EP office visit on day of admission: She continues to have multiple VT episodes with some ATP therapy, although no ICD shocks since starting mexiletine 10/24/2019. One slow VT episode 2.5hrs 11/3/2019. Patient asymptomatic with LVAD. On coumadin. No AFL since post-op event (paced out of rhythm 9/24/2019). CHB with 100% V pacing (LV lead off).   - Plan was to continue current regimen despite Mexiletine making her nauseous.  - Decreased Amiodarone to daily per EP plan on discharge last hospitalization   - NSVT continues intermittently on tele    LVAD (left ventricular assist device) present  -HeartMate 3 Implanted 9/10/19 as DT.  -Implanted by Dr. Walden  -INR sub therapeutic. Goal INR 2.0-3.0. Continue Coumadin.  INR 1.8. Boost tonight- hold off of heparin bridge for now.    - Previous home dose was 3mg on Wed and 1.5mg QDaily  -Antiplatelets:  mg.  -LDH is stable Will monitor daily.   -Speed set at 5300;   -Not listed for OHTx:  was declined due to pulmonary hypertension and incomplete care giving plan    Procedure: Device Interrogation Including analysis of device parameters  Current Settings: Ventricular Assist Device  Review of device function is stable    TXP LVAD INTERROGATIONS 11/18/2019 11/17/2019 11/17/2019 11/17/2019 11/16/2019 11/16/2019 11/16/2019   Type HeartMate3 HeartMate3 HeartMate3 HeartMate3 HeartMate3 HeartMate3 HeartMate3   Flow 4.0 3.6 3.8 3.5 3.1 3.5 2.5   Speed 5300 5300 5300 5300 5100 5300 5300   PI 3.5 4.4 3.3 4.1 4.1 3.6 6.7   Power (Orellana) 3.8 3.7 3.8 3.7 3.5 3.6 3.5   LSL 4900 4900 4900 4900 - 4900 4900   Pulsatility Intermittent pulse Intermittent pulse Intermittent pulse Intermittent pulse - Intermittent pulse -       CKD (chronic kidney disease)  -Baseline creat appears 1.3-1.6  -Will monitor with diuresis     Acute on chronic combined systolic and diastolic heart failure  -NICM  -Last 2D Echo 11/13/19: LVEF 20%, LVEDD 6.3 cm with speed at 5300 and severe MR, will review today with Dr Chaudhari  -bouchra on hold due to hypotension and LFAs. RHC today and will adjust PRN   - patient is -7.6 L and 8 lbs since admission   - was on lasix 20 PRN at home PTA  -2g Na dietary restriction, 1500 mL fluid restriction, strict I/Os      ICD (implantable cardioverter-defibrillator) in place  - [employer]onic ICD  - See above VT        Julieth Ramos PA-C  Heart Transplant  [facility]

## 2019-11-18 NOTE — PROGRESS NOTES
Patient had a low flow alarm. She stated that the pump flow went down to 2.2. She also stated that she changed position, from lying to sitting during the alarm. Patient is asymptomatic. Doppler 82/0. S. ZAC Ramos notified. Will continue to monitor.

## 2019-11-18 NOTE — ASSESSMENT & PLAN NOTE
-NICM  -Last 2D Echo 11/13/19: LVEF 20%, LVEDD 6.3 cm with speed at 5300 and severe MR, will review today with Dr Chaudhari  -lasix on hold due to hypotension and LFAs. RHC today and will adjust PRN   - patient is -7.6 L and 8 lbs since admission   - was on lasix 20 PRN at home PTA  -2g Na dietary restriction, 1500 mL fluid restriction, strict I/Os

## 2019-11-19 LAB
ANION GAP SERPL CALC-SCNC: 7 MMOL/L (ref 8–16)
APTT BLDCRRT: 30.2 SEC (ref 21–32)
BASOPHILS # BLD AUTO: 0.04 K/UL (ref 0–0.2)
BASOPHILS NFR BLD: 0.9 % (ref 0–1.9)
BUN SERPL-MCNC: 29 MG/DL (ref 6–20)
CALCIUM SERPL-MCNC: 9.2 MG/DL (ref 8.7–10.5)
CHLORIDE SERPL-SCNC: 107 MMOL/L (ref 95–110)
CO2 SERPL-SCNC: 27 MMOL/L (ref 23–29)
CREAT SERPL-MCNC: 1.4 MG/DL (ref 0.5–1.4)
DIFFERENTIAL METHOD: ABNORMAL
EOSINOPHIL # BLD AUTO: 0.2 K/UL (ref 0–0.5)
EOSINOPHIL NFR BLD: 3.4 % (ref 0–8)
ERYTHROCYTE [DISTWIDTH] IN BLOOD BY AUTOMATED COUNT: 16.7 % (ref 11.5–14.5)
EST. GFR  (AFRICAN AMERICAN): 50.5 ML/MIN/1.73 M^2
EST. GFR  (NON AFRICAN AMERICAN): 43.8 ML/MIN/1.73 M^2
GLUCOSE SERPL-MCNC: 87 MG/DL (ref 70–110)
HCT VFR BLD AUTO: 40.6 % (ref 37–48.5)
HGB BLD-MCNC: 11.3 G/DL (ref 12–16)
IMM GRANULOCYTES # BLD AUTO: 0.02 K/UL (ref 0–0.04)
IMM GRANULOCYTES NFR BLD AUTO: 0.4 % (ref 0–0.5)
INR PPP: 1.9 (ref 0.8–1.2)
LDH SERPL L TO P-CCNC: 195 U/L (ref 110–260)
LYMPHOCYTES # BLD AUTO: 1.1 K/UL (ref 1–4.8)
LYMPHOCYTES NFR BLD: 24.1 % (ref 18–48)
MAGNESIUM SERPL-MCNC: 2.2 MG/DL (ref 1.6–2.6)
MCH RBC QN AUTO: 24.5 PG (ref 27–31)
MCHC RBC AUTO-ENTMCNC: 27.8 G/DL (ref 32–36)
MCV RBC AUTO: 88 FL (ref 82–98)
MONOCYTES # BLD AUTO: 0.3 K/UL (ref 0.3–1)
MONOCYTES NFR BLD: 5.8 % (ref 4–15)
NEUTROPHILS # BLD AUTO: 3.1 K/UL (ref 1.8–7.7)
NEUTROPHILS NFR BLD: 65.4 % (ref 38–73)
NRBC BLD-RTO: 0 /100 WBC
PHOSPHATE SERPL-MCNC: 3.7 MG/DL (ref 2.7–4.5)
PLATELET # BLD AUTO: 306 K/UL (ref 150–350)
PMV BLD AUTO: 9.8 FL (ref 9.2–12.9)
POTASSIUM SERPL-SCNC: 4.2 MMOL/L (ref 3.5–5.1)
PROTHROMBIN TIME: 18.4 SEC (ref 9–12.5)
RBC # BLD AUTO: 4.61 M/UL (ref 4–5.4)
SODIUM SERPL-SCNC: 141 MMOL/L (ref 136–145)
WBC # BLD AUTO: 4.69 K/UL (ref 3.9–12.7)

## 2019-11-19 PROCEDURE — 20600001 HC STEP DOWN PRIVATE ROOM: Mod: NTX

## 2019-11-19 PROCEDURE — 27000248 HC VAD-ADDITIONAL DAY: Mod: NTX

## 2019-11-19 PROCEDURE — 83735 ASSAY OF MAGNESIUM: CPT | Mod: NTX

## 2019-11-19 PROCEDURE — 85610 PROTHROMBIN TIME: CPT | Mod: NTX

## 2019-11-19 PROCEDURE — 36415 COLL VENOUS BLD VENIPUNCTURE: CPT | Mod: NTX

## 2019-11-19 PROCEDURE — 85730 THROMBOPLASTIN TIME PARTIAL: CPT | Mod: NTX

## 2019-11-19 PROCEDURE — 85025 COMPLETE CBC W/AUTO DIFF WBC: CPT | Mod: NTX

## 2019-11-19 PROCEDURE — 63600175 PHARM REV CODE 636 W HCPCS: Mod: NTX | Performed by: PHYSICIAN ASSISTANT

## 2019-11-19 PROCEDURE — 83615 LACTATE (LD) (LDH) ENZYME: CPT | Mod: NTX

## 2019-11-19 PROCEDURE — 93750 PR INTERROGATE VENT ASSIST DEV, IN PERSON, W PHYSICIAN ANALYSIS: ICD-10-PCS | Mod: NTX,,, | Performed by: INTERNAL MEDICINE

## 2019-11-19 PROCEDURE — 25000003 PHARM REV CODE 250: Mod: NTX | Performed by: PHYSICIAN ASSISTANT

## 2019-11-19 PROCEDURE — 99233 PR SUBSEQUENT HOSPITAL CARE,LEVL III: ICD-10-PCS | Mod: 25,NTX,, | Performed by: INTERNAL MEDICINE

## 2019-11-19 PROCEDURE — 99233 SBSQ HOSP IP/OBS HIGH 50: CPT | Mod: 25,NTX,, | Performed by: INTERNAL MEDICINE

## 2019-11-19 PROCEDURE — 25000003 PHARM REV CODE 250: Mod: NTX | Performed by: INTERNAL MEDICINE

## 2019-11-19 PROCEDURE — 93750 INTERROGATION VAD IN PERSON: CPT | Mod: NTX,,, | Performed by: INTERNAL MEDICINE

## 2019-11-19 PROCEDURE — 84100 ASSAY OF PHOSPHORUS: CPT | Mod: NTX

## 2019-11-19 PROCEDURE — 80048 BASIC METABOLIC PNL TOTAL CA: CPT | Mod: NTX

## 2019-11-19 RX ORDER — WARFARIN 2 MG/1
2 TABLET ORAL ONCE
Status: COMPLETED | OUTPATIENT
Start: 2019-11-19 | End: 2019-11-19

## 2019-11-19 RX ORDER — FUROSEMIDE 10 MG/ML
80 INJECTION INTRAMUSCULAR; INTRAVENOUS ONCE
Status: COMPLETED | OUTPATIENT
Start: 2019-11-19 | End: 2019-11-19

## 2019-11-19 RX ORDER — ASPIRIN 325 MG
325 TABLET, DELAYED RELEASE (ENTERIC COATED) ORAL DAILY
Qty: 90 TABLET | Refills: 3 | Status: SHIPPED | OUTPATIENT
Start: 2019-11-19 | End: 2019-12-30 | Stop reason: HOSPADM

## 2019-11-19 RX ORDER — MIRTAZAPINE 15 MG/1
15 TABLET, FILM COATED ORAL NIGHTLY
Qty: 90 TABLET | Refills: 3 | Status: ON HOLD | OUTPATIENT
Start: 2019-11-19 | End: 2020-01-10 | Stop reason: HOSPADM

## 2019-11-19 RX ORDER — PANTOPRAZOLE SODIUM 40 MG/1
40 TABLET, DELAYED RELEASE ORAL DAILY
Qty: 90 TABLET | Refills: 3 | Status: SHIPPED | OUTPATIENT
Start: 2019-11-19 | End: 2019-12-30 | Stop reason: HOSPADM

## 2019-11-19 RX ADMIN — LISINOPRIL 10 MG: 10 TABLET ORAL at 08:11

## 2019-11-19 RX ADMIN — FUROSEMIDE 15 MG/HR: 10 INJECTION, SOLUTION INTRAMUSCULAR; INTRAVENOUS at 09:11

## 2019-11-19 RX ADMIN — MIRTAZAPINE 15 MG: 15 TABLET, FILM COATED ORAL at 09:11

## 2019-11-19 RX ADMIN — MEXILETINE HYDROCHLORIDE 150 MG: 150 CAPSULE ORAL at 04:11

## 2019-11-19 RX ADMIN — GABAPENTIN 200 MG: 100 CAPSULE ORAL at 08:11

## 2019-11-19 RX ADMIN — SENNOSIDES AND DOCUSATE SODIUM 2 TABLET: 8.6; 5 TABLET ORAL at 08:11

## 2019-11-19 RX ADMIN — GABAPENTIN 200 MG: 100 CAPSULE ORAL at 09:11

## 2019-11-19 RX ADMIN — ASPIRIN 325 MG: 325 TABLET, DELAYED RELEASE ORAL at 08:11

## 2019-11-19 RX ADMIN — FUROSEMIDE 15 MG/HR: 10 INJECTION, SOLUTION INTRAMUSCULAR; INTRAVENOUS at 10:11

## 2019-11-19 RX ADMIN — MEXILETINE HYDROCHLORIDE 150 MG: 150 CAPSULE ORAL at 02:11

## 2019-11-19 RX ADMIN — ZOLPIDEM TARTRATE 5 MG: 5 TABLET ORAL at 09:11

## 2019-11-19 RX ADMIN — LISINOPRIL 10 MG: 10 TABLET ORAL at 09:11

## 2019-11-19 RX ADMIN — MAGNESIUM OXIDE TAB 400 MG (241.3 MG ELEMENTAL MG) 400 MG: 400 (241.3 MG) TAB at 08:11

## 2019-11-19 RX ADMIN — AMIODARONE HYDROCHLORIDE 400 MG: 200 TABLET ORAL at 08:11

## 2019-11-19 RX ADMIN — PANTOPRAZOLE SODIUM 40 MG: 40 TABLET, DELAYED RELEASE ORAL at 08:11

## 2019-11-19 RX ADMIN — MEXILETINE HYDROCHLORIDE 150 MG: 150 CAPSULE ORAL at 09:11

## 2019-11-19 RX ADMIN — VENLAFAXINE 150 MG: 37.5 TABLET ORAL at 08:11

## 2019-11-19 RX ADMIN — FUROSEMIDE 80 MG: 10 INJECTION, SOLUTION INTRAMUSCULAR; INTRAVENOUS at 10:11

## 2019-11-19 RX ADMIN — SPIRONOLACTONE 12.5 MG: 25 TABLET, FILM COATED ORAL at 08:11

## 2019-11-19 RX ADMIN — WARFARIN SODIUM 2 MG: 2 TABLET ORAL at 04:11

## 2019-11-19 NOTE — PROGRESS NOTES
SW faxed pt's updated HH orders to [facility] in [place] [phone] fax#[phone]). SW to follow up closer to pt's discharge to confirm when pt is returning home. SW remains available.

## 2019-11-19 NOTE — ASSESSMENT & PLAN NOTE
-HeartMate 3 Implanted 9/10/19 as DT.  -Implanted by Dr. Walden  -INR sub therapeutic. Goal INR 2.0-3.0. Continue Coumadin.  INR 1.9. Boost tonight- hold off of heparin bridge for now.    - Previous home dose was 3mg on Wed and 1.5mg QDaily  -Antiplatelets:  mg.  -LDH is stable Will monitor daily.   -Speed set at 5300, had another asymptomatic LFA on 11/18  -Not listed for OHTx: was declined due to pulmonary hypertension and incomplete care giving plan    Procedure: Device Interrogation Including analysis of device parameters  Current Settings: Ventricular Assist Device  Review of device function is stable    TXP LVAD INTERROGATIONS 11/19/2019 11/18/2019 11/18/2019 11/18/2019 11/18/2019 11/17/2019 11/17/2019   Type HeartMate3 HeartMate3 HeartMate3 HeartMate3 HeartMate3 HeartMate3 HeartMate3   Flow 3.6 3.7 3.5 3.6 4.0 3.6 3.8   Speed 5300 5300 5300 5300 5300 5300 5300   PI 4.7 4.7 3.4 4.4 3.5 4.4 3.3   Power (Orellana) 3.7 3.7 3.7 3.7 3.8 3.7 3.8   LSL 4900 4900 4900 4900 4900 4900 4900   Pulsatility Intermittent pulse - Intermittent pulse Intermittent pulse Intermittent pulse Intermittent pulse Intermittent pulse

## 2019-11-19 NOTE — PROGRESS NOTES
"LVAD dressing change completed using sterile technique with soap and water. DLES is a "3" with minimal drainage noted on the drain sponge. Patient's drive line site is red, with dry crust around the drive line. Patient stated that the drainage is getting better than it was once before. Tenderness noted during the dressing change.Tolerated well. Next dressing change is due on 11/20/2019.    "

## 2019-11-19 NOTE — PLAN OF CARE
"LOC: alert and oriented x 4.   SKIN: The skin is warm, dry. DLES "2"; LVAD dressing CDI.  RESPIRATORY: Respirations are WNL, even and unlabored. Normal effort and rate noted. No accessory muscle use noted. Pt. On room air.  CARDIAC: Patient runs a paced rhythm / LVAD rhythm on the monitor.   ABDOMEN: Soft / rounded and non tender to palpation. No distention noted.   URINARY: continent; up to toilet.  EXTREMITIES: Extremities are WNL; general weakness throughout.  Neuro: Pt able to follow commands and is calm and cooperative with care.       POC reviewed with patient. VSS; dopplers in the 80's. Patient free of injuries and falls. Patient has no c/o pain or discomfort. Patient went for RHC today; RIJ dressing CDI. RN performed LVAD dressing change; DLES "2". LVAD dressing CDI. All questions were addressed. Will continue to monitor.   "

## 2019-11-19 NOTE — PROCEDURES
Prior to procedure chart and labs reviewed  Patient examined  This procedure has been fully reviewed with the patient and written informed consent has been obtained.    [facility] Lab Post Procedure Note    Patient tolerated the procedure well.   There were no complications.   Please see full report in EPIC for details.

## 2019-11-19 NOTE — PROGRESS NOTES
[facility]  Heart Transplant  Progress Note    Patient Name: Deborah Navas  MRN: [id number]  Admission Date: 11/13/2019  Hospital Length of Stay: 4 days  Attending Physician: Vi Bryant MD  Primary Care Provider: Primary Doctor No  Principal Problem:Left ventricular assist device (LVAD) complication    Subjective:     Interval History: One asymptomatic low flow alarms yesterday when patient went from lying to sitting. RHC with elevated L and R sided filling pressures along with low CI. Per Dr Chaudhari will continue with aggressive diuresis.     Scheduled Meds:   amiodarone  400 mg Oral Daily    aspirin  325 mg Oral Daily    furosemide  80 mg Intravenous Once    gabapentin  200 mg Oral BID    lisinopril  10 mg Oral BID    magnesium oxide  400 mg Oral Daily    mexiletine  150 mg Oral Q8H    mirtazapine  15 mg Oral QHS    pantoprazole  40 mg Oral Daily    spironolactone  12.5 mg Oral Daily    venlafaxine  150 mg Oral Daily     PRN Meds:sodium chloride 0.9%, ALPRAZolam, pneumoc 13-amber conj-dip cr(PF), promethazine, senna-docusate 8.6-50 mg, zolpidem    Review of patient's allergies indicates:   Allergen Reactions    Adhesive Blisters     Reaction to area in chest and up only    Codeine Itching     Objective:     Vital Signs (Most Recent):  Temp: 97.8 °F (36.6 °C) (11/19/19 0845)  Pulse: 61 (11/19/19 0845)  Resp: 16 (11/19/19 0845)  BP: (!) 86/0 (11/19/19 0845)  SpO2: 96 % (11/19/19 0845) Vital Signs (24h Range):  Temp:  [97.5 °F (36.4 °C)-97.9 °F (36.6 °C)] 97.8 °F (36.6 °C)  Pulse:  [50-93] 61  Resp:  [16-18] 16  SpO2:  [91 %-99 %] 96 %  BP: ()/(0-68) 86/0     Patient Vitals for the past 72 hrs (Last 3 readings):   Weight   11/19/19 0500 103.9 kg (229 lb 0.9 oz)   11/18/19 0500 103.7 kg (228 lb 9.9 oz)   11/17/19 0447 102.4 kg (225 lb 12 oz)     Body mass index is 35.88 kg/m².      Intake/Output Summary (Last 24 hours) at 11/19/2019 0958  Last data filed at 11/19/2019  0500  Gross per 24 hour   Intake 720 ml   Output 1300 ml   Net -580 ml     Hemodynamic Parameters:    Telemetry: NSR      Physical Exam  Constitutional: laying in bed NAD  Neck: Normal range of motion. Neck supple. JVP difficult to appreciate, jaw?  Cardiovascular: Normal rate, regular rhythm, Smooth VAD hum. No murmur heard.  Pulmonary/Chest: Effort normal. No stridor. No respiratory distress. She has decreased breath sounds in the left lower field. She has no wheezes. She has no rales.   Abdominal: Soft.Obese appearance, bowel sounds are normal. She exhibits some distension. There is no tenderness.   Musculoskeletal: Normal range of motion. She exhibits no edema or tenderness.   Neurological: She is alert and oriented to person, place, and time. She has normal strength and normal reflexes. Coordination normal.   Skin: Skin is warm and dry. No rash noted. No erythema      Significant Labs:  CBC:  Recent Labs   Lab 11/17/19 0345 11/18/19 0412 11/19/19 0444   WBC 4.22 4.68 4.69   RBC 4.62 4.87 4.61   HGB 11.5* 12.0 11.3*   HCT 40.2 42.1 40.6    324 306   MCV 87 86 88   MCH 24.9* 24.6* 24.5*   MCHC 28.6* 28.5* 27.8*     BNP:  Recent Labs   Lab 11/13/19  1038 11/15/19  0428 11/18/19 0412   * 207* 331*     CMP:  Recent Labs   Lab 11/13/19  1038  11/15/19  0428  11/17/19  0344 11/18/19 0411 11/18/19 0412 11/19/19  0444   *   < > 96   < > 113* 111*  --  87   CALCIUM 9.6   < > 9.4   < > 9.4 9.7  --  9.2   ALBUMIN 3.6  --  3.4*  --   --   --  3.6  --    PROT 7.2  --  7.2  --   --   --  7.3  --       < > 140   < > 137 137  --  141   K 3.9   < > 3.6   < > 4.1 4.0  --  4.2   CO2 23   < > 25   < > 19* 24  --  27      < > 102   < > 105 103  --  107   BUN 19   < > 21*   < > 33* 34*  --  29*   CREATININE 1.7*   < > 1.7*   < > 1.9* 1.7*  --  1.4   ALKPHOS 92  --  91  --   --   --  93  --    ALT 16  --  12  --   --   --  13  --    AST 20  --  15  --   --   --  17  --    BILITOT 0.6  --  0.5  --    --   --  0.4  --     < > = values in this interval not displayed.      Coagulation:   Recent Labs   Lab 11/17/19  0345 11/18/19  0411 11/19/19  0444   INR 2.0* 1.8* 1.9*   APTT 30.5 29.8 30.2     LDH:  Recent Labs   Lab 11/17/19  0345 11/18/19  0411 11/19/19  0444    222 195     Microbiology:  Microbiology Results (last 7 days)     ** No results found for the last 168 hours. **          I have reviewed all pertinent labs within the past 24 hours.    Estimated Creatinine Clearance: 59.6 mL/min (based on SCr of 1.4 mg/dL).    Diagnostic Results:  I have reviewed and interpreted all pertinent imaging results/findings within the past 24 hours.    Assessment and Plan:     50 yo WF with NICM, s/p HM3 implantation 9/10/19 (post up coarse uncomplicated with the exception of+ diaphragmatic stimulation of LV lead and pleural effusion with chest tube placement, atrial flutter with NADINE/DCCV), V-fib reported in setting of hypokalemia with appropriate shock from ICD on Amiodarone prior to LVAD placement; and recent hospitalizations for ventricular arrythmias; started on Mexilitine.  She was seen in EP clinic today and she continues to have multiple VT episodes with some ATP therapy, although no ICD shocks since starting mexiletine 10/24/2019. One slow VT episode 2.5hrs 11/3/2019. Patient asymptomatic with LVAD. On coumadin. No AFL since post-op event (paced out of rhythm 9/24/2019). CHB with 100% V pacing (LV lead off). She does not feel well. Dry heaving with mexiletine. Taking phenergan PRN. 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   She was seen in HTS clinic and reported 4 low flow alarms the last 4 nights.  She reports they occur in her sleep and last for only a few seconds.  By the time she wakes up; they quickly stop.  She does report to possibly being little volume overloaded.  She hasn't noticed weight gain at home but thinks she may have little extra fluid.  She denies SOB,  chest pain, palpitations, LEGER. In review of her records: she was 223lbs on 10/24/19 during previous hospitalization and is 235lbs now.  Her lasix had previously been decreased to prn.     * Left ventricular assist device (LVAD) complication  -hx low flow alarms prior to admit, last LFA 11/18  -Possibly secondary to ADHF.  -Echo shows IVS shifting to right side and LVED increased to 6.4.    -Formal report of CT unremarkable; however, it was a non contrast as creat was elevated above baseline on admit and inflow and outflow cannulas not accurately visualized     Essential hypertension  -Patient is non-pulsatile  -Doppler goal 60-90 mmHg  -Antihypertensive medications include  Lisinopril  -Hydralazine was started on evening of admit and then changed to low dose Norvasc and Spironolactone to aid with BP and low K+.  Hesitated on increasing Lisinopril because of active diuresis and concern for fluctuation of creatinine    - amlodipine now on hold in setting of symptomatic hypotension and LFAs on 11/16  - If BP remains stable would like to increase ACE/add isosorbide/hydral for afterload reduction is setting of elevated wedge, will continue with diuresis for now    Ventricular tachycardia  - Per EP office visit on day of admission: She continues to have multiple VT episodes with some ATP therapy, although no ICD shocks since starting mexiletine 10/24/2019. One slow VT episode 2.5hrs 11/3/2019. Patient asymptomatic with LVAD. On coumadin. No AFL since post-op event (paced out of rhythm 9/24/2019). CHB with 100% V pacing (LV lead off).   - Plan was to continue current regimen despite Mexiletine making her nauseous.  - Decreased Amiodarone to daily per EP plan on discharge last hospitalization   - NSVT continues intermittently on tele    LVAD (left ventricular assist device) present  -HeartMate 3 Implanted 9/10/19 as DT.  -Implanted by Dr. Walden  -INR sub therapeutic. Goal INR 2.0-3.0. Continue Coumadin.  INR 1.9. Boost  tonight- hold off of heparin bridge for now.    - Previous home dose was 3mg on Wed and 1.5mg QDaily  -Antiplatelets:  mg.  -LDH is stable Will monitor daily.   -Speed set at 5300, had another asymptomatic LFA on 11/18  -Not listed for OHTx: was declined due to pulmonary hypertension and incomplete care giving plan    Procedure: Device Interrogation Including analysis of device parameters  Current Settings: Ventricular Assist Device  Review of device function is stable    TXP LVAD INTERROGATIONS 11/19/2019 11/18/2019 11/18/2019 11/18/2019 11/18/2019 11/17/2019 11/17/2019   Type HeartMate3 HeartMate3 HeartMate3 HeartMate3 HeartMate3 HeartMate3 HeartMate3   Flow 3.6 3.7 3.5 3.6 4.0 3.6 3.8   Speed 5300 5300 5300 5300 5300 5300 5300   PI 4.7 4.7 3.4 4.4 3.5 4.4 3.3   Power (Orellana) 3.7 3.7 3.7 3.7 3.8 3.7 3.8   LSL 4900 4900 4900 4900 4900 4900 4900   Pulsatility Intermittent pulse - Intermittent pulse Intermittent pulse Intermittent pulse Intermittent pulse Intermittent pulse       CKD (chronic kidney disease)  - Baseline creat appears 1.3-1.6  - Will monitor with diuresis     Acute on chronic combined systolic and diastolic heart failure  -NICM  -Last 2D Echo 11/13/19: LVEF 20%, LVEDD 6.3 cm with speed at 5300 and severe MR, no adjustments today per Dr Chaudhari  -lasix 80/20 restarted today after RHC (RA 20, W 20, CO/CI 3.79/1.77, SVR 1500)  - GDMT: ACE 10 BID, aldactone 12.5, consider isordil+hydral?   - patient is -8.2 L since admission   - was on lasix 20 PRN at home PTA  -2g Na dietary restriction, 1500 mL fluid restriction, strict I/Os      ICD (implantable cardioverter-defibrillator) in place  - [employer]onic ICD  - See above VT        Julieth Ramos PA-C  Heart Transplant  [facility]

## 2019-11-19 NOTE — ASSESSMENT & PLAN NOTE
-hx low flow alarms prior to admit, last LFA 11/18  -Possibly secondary to ADHF.  -Echo shows IVS shifting to right side and LVED increased to 6.4.    -Formal report of CT unremarkable; however, it was a non contrast as creat was elevated above baseline on admit and inflow and outflow cannulas not accurately visualized

## 2019-11-19 NOTE — SUBJECTIVE & OBJECTIVE
Interval History: One asymptomatic low flow alarms yesterday when patient went from lying to sitting. RHC with elevated L and R sided filling pressures along with low CI. Per Dr Chaudhari will continue with aggressive diuresis.     Scheduled Meds:   amiodarone  400 mg Oral Daily    aspirin  325 mg Oral Daily    furosemide  80 mg Intravenous Once    gabapentin  200 mg Oral BID    lisinopril  10 mg Oral BID    magnesium oxide  400 mg Oral Daily    mexiletine  150 mg Oral Q8H    mirtazapine  15 mg Oral QHS    pantoprazole  40 mg Oral Daily    spironolactone  12.5 mg Oral Daily    venlafaxine  150 mg Oral Daily     PRN Meds:sodium chloride 0.9%, ALPRAZolam, pneumoc 13-amber conj-dip cr(PF), promethazine, senna-docusate 8.6-50 mg, zolpidem    Review of patient's allergies indicates:   Allergen Reactions    Adhesive Blisters     Reaction to area in chest and up only    Codeine Itching     Objective:     Vital Signs (Most Recent):  Temp: 97.8 °F (36.6 °C) (11/19/19 0845)  Pulse: 61 (11/19/19 0845)  Resp: 16 (11/19/19 0845)  BP: (!) 86/0 (11/19/19 0845)  SpO2: 96 % (11/19/19 0845) Vital Signs (24h Range):  Temp:  [97.5 °F (36.4 °C)-97.9 °F (36.6 °C)] 97.8 °F (36.6 °C)  Pulse:  [50-93] 61  Resp:  [16-18] 16  SpO2:  [91 %-99 %] 96 %  BP: ()/(0-68) 86/0     Patient Vitals for the past 72 hrs (Last 3 readings):   Weight   11/19/19 0500 103.9 kg (229 lb 0.9 oz)   11/18/19 0500 103.7 kg (228 lb 9.9 oz)   11/17/19 0447 102.4 kg (225 lb 12 oz)     Body mass index is 35.88 kg/m².      Intake/Output Summary (Last 24 hours) at 11/19/2019 0958  Last data filed at 11/19/2019 0500  Gross per 24 hour   Intake 720 ml   Output 1300 ml   Net -580 ml     Hemodynamic Parameters:    Telemetry: NSR      Physical Exam  Constitutional: laying in bed NAD  Neck: Normal range of motion. Neck supple. JVP difficult to appreciate, jaw?  Cardiovascular: Normal rate, regular rhythm, Smooth VAD hum. No murmur heard.  Pulmonary/Chest: Effort  normal. No stridor. No respiratory distress. She has decreased breath sounds in the left lower field. She has no wheezes. She has no rales.   Abdominal: Soft.Obese appearance, bowel sounds are normal. She exhibits some distension. There is no tenderness.   Musculoskeletal: Normal range of motion. She exhibits no edema or tenderness.   Neurological: She is alert and oriented to person, place, and time. She has normal strength and normal reflexes. Coordination normal.   Skin: Skin is warm and dry. No rash noted. No erythema      Significant Labs:  CBC:  Recent Labs   Lab 11/17/19  0345 11/18/19  0412 11/19/19 0444   WBC 4.22 4.68 4.69   RBC 4.62 4.87 4.61   HGB 11.5* 12.0 11.3*   HCT 40.2 42.1 40.6    324 306   MCV 87 86 88   MCH 24.9* 24.6* 24.5*   MCHC 28.6* 28.5* 27.8*     BNP:  Recent Labs   Lab 11/13/19  1038 11/15/19  0428 11/18/19 0412   * 207* 331*     CMP:  Recent Labs   Lab 11/13/19  1038  11/15/19  0428  11/17/19  0344 11/18/19  0411 11/18/19 0412 11/19/19  0444   *   < > 96   < > 113* 111*  --  87   CALCIUM 9.6   < > 9.4   < > 9.4 9.7  --  9.2   ALBUMIN 3.6  --  3.4*  --   --   --  3.6  --    PROT 7.2  --  7.2  --   --   --  7.3  --       < > 140   < > 137 137  --  141   K 3.9   < > 3.6   < > 4.1 4.0  --  4.2   CO2 23   < > 25   < > 19* 24  --  27      < > 102   < > 105 103  --  107   BUN 19   < > 21*   < > 33* 34*  --  29*   CREATININE 1.7*   < > 1.7*   < > 1.9* 1.7*  --  1.4   ALKPHOS 92  --  91  --   --   --  93  --    ALT 16  --  12  --   --   --  13  --    AST 20  --  15  --   --   --  17  --    BILITOT 0.6  --  0.5  --   --   --  0.4  --     < > = values in this interval not displayed.      Coagulation:   Recent Labs   Lab 11/17/19 0345 11/18/19 0411 11/19/19 0444   INR 2.0* 1.8* 1.9*   APTT 30.5 29.8 30.2     LDH:  Recent Labs   Lab 11/17/19 0345 11/18/19 0411 11/19/19  0444    222 195     Microbiology:  Microbiology Results (last 7 days)     ** No  results found for the last 168 hours. **          I have reviewed all pertinent labs within the past 24 hours.    Estimated Creatinine Clearance: 59.6 mL/min (based on SCr of 1.4 mg/dL).    Diagnostic Results:  I have reviewed and interpreted all pertinent imaging results/findings within the past 24 hours.

## 2019-11-19 NOTE — ASSESSMENT & PLAN NOTE
-Patient is non-pulsatile  -Doppler goal 60-90 mmHg  -Antihypertensive medications include  Lisinopril  -Hydralazine was started on evening of admit and then changed to low dose Norvasc and Spironolactone to aid with BP and low K+.  Hesitated on increasing Lisinopril because of active diuresis and concern for fluctuation of creatinine    - amlodipine now on hold in setting of symptomatic hypotension and LFAs on 11/16  - If BP remains stable would like to increase ACE/add isosorbide/hydral for afterload reduction is setting of elevated wedge, will continue with diuresis for now

## 2019-11-19 NOTE — ASSESSMENT & PLAN NOTE
-NICM  -Last 2D Echo 11/13/19: LVEF 20%, LVEDD 6.3 cm with speed at 5300 and severe MR, no adjustments today per Dr Chaudhari  -lasix 80/20 restarted today after RHC (RA 20, W 20, CO/CI 3.79/1.77, SVR 1500)  - GDMT: ACE 10 BID, aldactone 12.5, consider isordil+hydral?   - patient is -8.2 L since admission   - was on lasix 20 PRN at home PTA  -2g Na dietary restriction, 1500 mL fluid restriction, strict I/Os

## 2019-11-19 NOTE — PROGRESS NOTES
11/19/2019  Christo Cooper    Current provider:  Vi Bryant MD      I, Christo Cooper, rounded on Deborah Navas to ensure all mechanical assist device settings (IABP or VAD) were appropriate and all parameters were within limits.  I was able to ensure all back up equipment was present, the staff had no issues, and the Perfusion Department daily rounding was complete.    8:22 AM

## 2019-11-19 NOTE — PLAN OF CARE
Patient AAOx4. Mother is at the bedside assisting with care. No alarms this shift. Patient started on Lasix gtt started at 15 mg/hr, 7.5 ml/hr.  Plan of care discussed with patient.  Patient ambulating independently, fall precautions in place. LVAD DP and numbers WNL, smooth LVAD hum. Patient is gets daily dressing changes with soap and water.   Patient has no complaints of pain. Discussed medications and care. Patient has no questions at this time. Will continue to monitor.

## 2019-11-20 ENCOUNTER — DOCUMENTATION ONLY (OUTPATIENT)
Dept: CARDIOLOGY | Facility: HOSPITAL | Age: 49
End: 2019-11-20

## 2019-11-20 LAB
ALBUMIN SERPL BCP-MCNC: 3.8 G/DL (ref 3.5–5.2)
ALLENS TEST: ABNORMAL
ALLENS TEST: ABNORMAL
ALP SERPL-CCNC: 97 U/L (ref 55–135)
ALT SERPL W/O P-5'-P-CCNC: 15 U/L (ref 10–44)
ANION GAP SERPL CALC-SCNC: 15 MMOL/L (ref 8–16)
APTT BLDCRRT: 30.1 SEC (ref 21–32)
AST SERPL-CCNC: 22 U/L (ref 10–40)
BASOPHILS # BLD AUTO: 0.04 K/UL (ref 0–0.2)
BASOPHILS NFR BLD: 0.9 % (ref 0–1.9)
BILIRUB DIRECT SERPL-MCNC: 0.3 MG/DL (ref 0.1–0.3)
BILIRUB SERPL-MCNC: 0.5 MG/DL (ref 0.1–1)
BNP SERPL-MCNC: 241 PG/ML (ref 0–99)
BUN SERPL-MCNC: 31 MG/DL (ref 6–20)
CALCIUM SERPL-MCNC: 9.8 MG/DL (ref 8.7–10.5)
CHLORIDE SERPL-SCNC: 102 MMOL/L (ref 95–110)
CO2 SERPL-SCNC: 25 MMOL/L (ref 23–29)
CREAT SERPL-MCNC: 1.7 MG/DL (ref 0.5–1.4)
CRP SERPL-MCNC: 8.7 MG/L (ref 0–8.2)
DELSYS: ABNORMAL
DELSYS: ABNORMAL
DIFFERENTIAL METHOD: ABNORMAL
EOSINOPHIL # BLD AUTO: 0.2 K/UL (ref 0–0.5)
EOSINOPHIL NFR BLD: 3.7 % (ref 0–8)
ERYTHROCYTE [DISTWIDTH] IN BLOOD BY AUTOMATED COUNT: 16.7 % (ref 11.5–14.5)
EST. GFR  (AFRICAN AMERICAN): 40 ML/MIN/1.73 M^2
EST. GFR  (NON AFRICAN AMERICAN): 34.7 ML/MIN/1.73 M^2
FIO2: 21
GLUCOSE SERPL-MCNC: 141 MG/DL (ref 70–110)
HCO3 UR-SCNC: 27.5 MMOL/L (ref 24–28)
HCO3 UR-SCNC: 28.6 MMOL/L (ref 24–28)
HCT VFR BLD AUTO: 42.3 % (ref 37–48.5)
HGB BLD-MCNC: 12.6 G/DL (ref 12–16)
IMM GRANULOCYTES # BLD AUTO: 0.01 K/UL (ref 0–0.04)
IMM GRANULOCYTES NFR BLD AUTO: 0.2 % (ref 0–0.5)
INR PPP: 2.2 (ref 0.8–1.2)
LDH SERPL L TO P-CCNC: 249 U/L (ref 110–260)
LYMPHOCYTES # BLD AUTO: 1 K/UL (ref 1–4.8)
LYMPHOCYTES NFR BLD: 22.7 % (ref 18–48)
MAGNESIUM SERPL-MCNC: 2.1 MG/DL (ref 1.6–2.6)
MCH RBC QN AUTO: 25.3 PG (ref 27–31)
MCHC RBC AUTO-ENTMCNC: 29.8 G/DL (ref 32–36)
MCV RBC AUTO: 85 FL (ref 82–98)
MODE: ABNORMAL
MONOCYTES # BLD AUTO: 0.2 K/UL (ref 0.3–1)
MONOCYTES NFR BLD: 5.3 % (ref 4–15)
NEUTROPHILS # BLD AUTO: 3.1 K/UL (ref 1.8–7.7)
NEUTROPHILS NFR BLD: 67.2 % (ref 38–73)
NRBC BLD-RTO: 0 /100 WBC
PCO2 BLDA: 46.1 MMHG (ref 35–45)
PCO2 BLDA: 50.4 MMHG (ref 35–45)
PH SMN: 7.36 [PH] (ref 7.35–7.45)
PH SMN: 7.38 [PH] (ref 7.35–7.45)
PHOSPHATE SERPL-MCNC: 4.1 MG/DL (ref 2.7–4.5)
PLATELET # BLD AUTO: 356 K/UL (ref 150–350)
PMV BLD AUTO: 9.7 FL (ref 9.2–12.9)
PO2 BLDA: 29 MMHG (ref 40–60)
PO2 BLDA: 37 MMHG (ref 40–60)
POC BE: 2 MMOL/L
POC BE: 3 MMOL/L
POC SATURATED O2: 54 % (ref 95–100)
POC SATURATED O2: 67 % (ref 95–100)
POC TCO2: 29 MMOL/L (ref 24–29)
POC TCO2: 30 MMOL/L (ref 24–29)
POTASSIUM SERPL-SCNC: 3.5 MMOL/L (ref 3.5–5.1)
PREALB SERPL-MCNC: 33 MG/DL (ref 20–43)
PROT SERPL-MCNC: 7.9 G/DL (ref 6–8.4)
PROTHROMBIN TIME: 20.9 SEC (ref 9–12.5)
RBC # BLD AUTO: 4.98 M/UL (ref 4–5.4)
SAMPLE: ABNORMAL
SAMPLE: ABNORMAL
SITE: ABNORMAL
SITE: ABNORMAL
SODIUM SERPL-SCNC: 142 MMOL/L (ref 136–145)
WBC # BLD AUTO: 4.54 K/UL (ref 3.9–12.7)

## 2019-11-20 PROCEDURE — 86706 HEP B SURFACE ANTIBODY: CPT | Mod: NTX

## 2019-11-20 PROCEDURE — 84134 ASSAY OF PREALBUMIN: CPT | Mod: NTX

## 2019-11-20 PROCEDURE — 27200188 HC TRANSDUCER, ART ADULT/PEDS: Mod: NTX

## 2019-11-20 PROCEDURE — 93005 ELECTROCARDIOGRAM TRACING: CPT | Mod: NTX

## 2019-11-20 PROCEDURE — 86704 HEP B CORE ANTIBODY TOTAL: CPT | Mod: NTX

## 2019-11-20 PROCEDURE — 86833 HLA CLASS II HIGH DEFIN QUAL: CPT | Mod: TXP

## 2019-11-20 PROCEDURE — C1751 CATH, INF, PER/CENT/MIDLINE: HCPCS | Mod: NTX

## 2019-11-20 PROCEDURE — 86832 HLA CLASS I HIGH DEFIN QUAL: CPT | Mod: TXP

## 2019-11-20 PROCEDURE — 99900035 HC TECH TIME PER 15 MIN (STAT): Mod: NTX

## 2019-11-20 PROCEDURE — 93010 EKG 12-LEAD: ICD-10-PCS | Mod: NTX,,, | Performed by: INTERNAL MEDICINE

## 2019-11-20 PROCEDURE — 83735 ASSAY OF MAGNESIUM: CPT | Mod: NTX

## 2019-11-20 PROCEDURE — 83615 LACTATE (LD) (LDH) ENZYME: CPT | Mod: NTX

## 2019-11-20 PROCEDURE — 85730 THROMBOPLASTIN TIME PARTIAL: CPT | Mod: NTX

## 2019-11-20 PROCEDURE — 86790 VIRUS ANTIBODY NOS: CPT | Mod: NTX

## 2019-11-20 PROCEDURE — 63600367 HC NITRIC OXIDE PER HOUR: Mod: NTX

## 2019-11-20 PROCEDURE — 84100 ASSAY OF PHOSPHORUS: CPT | Mod: NTX

## 2019-11-20 PROCEDURE — 93750 INTERROGATION VAD IN PERSON: CPT | Mod: NTX,,, | Performed by: INTERNAL MEDICINE

## 2019-11-20 PROCEDURE — 25000003 PHARM REV CODE 250: Mod: NTX | Performed by: INTERNAL MEDICINE

## 2019-11-20 PROCEDURE — 25000003 PHARM REV CODE 250: Mod: NTX

## 2019-11-20 PROCEDURE — 20000000 HC ICU ROOM: Mod: NTX

## 2019-11-20 PROCEDURE — 85610 PROTHROMBIN TIME: CPT | Mod: NTX

## 2019-11-20 PROCEDURE — 86140 C-REACTIVE PROTEIN: CPT | Mod: NTX

## 2019-11-20 PROCEDURE — 93750 PR INTERROGATE VENT ASSIST DEV, IN PERSON, W PHYSICIAN ANALYSIS: ICD-10-PCS | Mod: NTX,,, | Performed by: INTERNAL MEDICINE

## 2019-11-20 PROCEDURE — 86803 HEPATITIS C AB TEST: CPT | Mod: NTX

## 2019-11-20 PROCEDURE — 36556 INSERT NON-TUNNEL CV CATH: CPT | Mod: NTX

## 2019-11-20 PROCEDURE — 80048 BASIC METABOLIC PNL TOTAL CA: CPT | Mod: NTX

## 2019-11-20 PROCEDURE — 25000003 PHARM REV CODE 250: Mod: NTX | Performed by: PHYSICIAN ASSISTANT

## 2019-11-20 PROCEDURE — 83880 ASSAY OF NATRIURETIC PEPTIDE: CPT | Mod: NTX

## 2019-11-20 PROCEDURE — 86977 RBC SERUM PRETX INCUBJ/INHIB: CPT | Mod: 59,TXP

## 2019-11-20 PROCEDURE — 27000248 HC VAD-ADDITIONAL DAY: Mod: NTX

## 2019-11-20 PROCEDURE — 82803 BLOOD GASES ANY COMBINATION: CPT | Mod: NTX

## 2019-11-20 PROCEDURE — 80076 HEPATIC FUNCTION PANEL: CPT | Mod: NTX

## 2019-11-20 PROCEDURE — 93010 ELECTROCARDIOGRAM REPORT: CPT | Mod: NTX,,, | Performed by: INTERNAL MEDICINE

## 2019-11-20 PROCEDURE — 86703 HIV-1/HIV-2 1 RESULT ANTBDY: CPT | Mod: NTX

## 2019-11-20 PROCEDURE — 99233 PR SUBSEQUENT HOSPITAL CARE,LEVL III: ICD-10-PCS | Mod: 25,NTX,, | Performed by: INTERNAL MEDICINE

## 2019-11-20 PROCEDURE — 99233 SBSQ HOSP IP/OBS HIGH 50: CPT | Mod: 25,NTX,, | Performed by: INTERNAL MEDICINE

## 2019-11-20 PROCEDURE — 86592 SYPHILIS TEST NON-TREP QUAL: CPT | Mod: NTX

## 2019-11-20 PROCEDURE — 87340 HEPATITIS B SURFACE AG IA: CPT | Mod: NTX

## 2019-11-20 PROCEDURE — 63600175 PHARM REV CODE 636 W HCPCS: Mod: NTX | Performed by: PHYSICIAN ASSISTANT

## 2019-11-20 PROCEDURE — 36415 COLL VENOUS BLD VENIPUNCTURE: CPT | Mod: NTX

## 2019-11-20 PROCEDURE — 86644 CMV ANTIBODY: CPT | Mod: NTX

## 2019-11-20 PROCEDURE — 85025 COMPLETE CBC W/AUTO DIFF WBC: CPT | Mod: NTX

## 2019-11-20 PROCEDURE — 27000221 HC OXYGEN, UP TO 24 HOURS: Mod: NTX

## 2019-11-20 RX ORDER — LIDOCAINE HYDROCHLORIDE 10 MG/ML
INJECTION, SOLUTION EPIDURAL; INFILTRATION; INTRACAUDAL; PERINEURAL
Status: COMPLETED
Start: 2019-11-20 | End: 2019-11-20

## 2019-11-20 RX ORDER — POTASSIUM CHLORIDE 20 MEQ/1
40 TABLET, EXTENDED RELEASE ORAL ONCE
Status: COMPLETED | OUTPATIENT
Start: 2019-11-20 | End: 2019-11-20

## 2019-11-20 RX ADMIN — MAGNESIUM OXIDE TAB 400 MG (241.3 MG ELEMENTAL MG) 400 MG: 400 (241.3 MG) TAB at 08:11

## 2019-11-20 RX ADMIN — LISINOPRIL 10 MG: 10 TABLET ORAL at 08:11

## 2019-11-20 RX ADMIN — SENNOSIDES AND DOCUSATE SODIUM 2 TABLET: 8.6; 5 TABLET ORAL at 08:11

## 2019-11-20 RX ADMIN — POTASSIUM CHLORIDE 40 MEQ: 1500 TABLET, EXTENDED RELEASE ORAL at 08:11

## 2019-11-20 RX ADMIN — SPIRONOLACTONE 12.5 MG: 25 TABLET, FILM COATED ORAL at 08:11

## 2019-11-20 RX ADMIN — WARFARIN SODIUM 1.5 MG: 1 TABLET ORAL at 08:11

## 2019-11-20 RX ADMIN — ASPIRIN 325 MG: 325 TABLET, DELAYED RELEASE ORAL at 08:11

## 2019-11-20 RX ADMIN — GABAPENTIN 200 MG: 100 CAPSULE ORAL at 08:11

## 2019-11-20 RX ADMIN — PROMETHAZINE HYDROCHLORIDE 12.5 MG: 12.5 TABLET ORAL at 08:11

## 2019-11-20 RX ADMIN — AMIODARONE HYDROCHLORIDE 400 MG: 200 TABLET ORAL at 08:11

## 2019-11-20 RX ADMIN — MEXILETINE HYDROCHLORIDE 150 MG: 150 CAPSULE ORAL at 10:11

## 2019-11-20 RX ADMIN — LIDOCAINE HYDROCHLORIDE: 10 INJECTION, SOLUTION EPIDURAL; INFILTRATION; INTRACAUDAL at 05:11

## 2019-11-20 RX ADMIN — MIRTAZAPINE 15 MG: 15 TABLET, FILM COATED ORAL at 08:11

## 2019-11-20 RX ADMIN — PANTOPRAZOLE SODIUM 40 MG: 40 TABLET, DELAYED RELEASE ORAL at 08:11

## 2019-11-20 RX ADMIN — LIDOCAINE HYDROCHLORIDE 50 MG: 10 INJECTION, SOLUTION EPIDURAL; INFILTRATION; INTRACAUDAL; PERINEURAL at 04:11

## 2019-11-20 RX ADMIN — FUROSEMIDE 15 MG/HR: 10 INJECTION, SOLUTION INTRAMUSCULAR; INTRAVENOUS at 11:11

## 2019-11-20 RX ADMIN — ALPRAZOLAM 0.25 MG: 0.25 TABLET ORAL at 11:11

## 2019-11-20 RX ADMIN — MEXILETINE HYDROCHLORIDE 150 MG: 150 CAPSULE ORAL at 06:11

## 2019-11-20 RX ADMIN — VENLAFAXINE 150 MG: 37.5 TABLET ORAL at 08:11

## 2019-11-20 NOTE — ASSESSMENT & PLAN NOTE
-HeartMate 3 Implanted 9/10/19 as DT.  -Implanted by Dr. Walden  -INR therapeutic. Goal INR 2.0-3.0. Continue Coumadin.   - Previous home dose was 3mg on Wed and 1.5mg QDaily  -Antiplatelets:  mg.  -LDH is stable Will monitor daily.   -Speed set at 5300, had another asymptomatic LFA on 11/20  -Not listed for OHTx: was declined due to pulmonary hypertension and incomplete care giving plan    Procedure: Device Interrogation Including analysis of device parameters  Current Settings: Ventricular Assist Device  Review of device function is stable    TXP LVAD INTERROGATIONS 11/20/2019 11/20/2019 11/19/2019 11/19/2019 11/19/2019 11/19/2019 11/19/2019   Type HeartMate3 HeartMate3 HeartMate3 HeartMate3 HeartMate3 HeartMate3 HeartMate3   Flow 3.7 3.1 3.1 4.2 3.1 4.0 3.6   Speed 5300 5300 5300 5300 5300 5300 5300   PI 2.7 3.5 3.5 3.3 2.8 3.8 4.7   Power (Orellana) 3.5 3.6 3.6 3.7 3.6 3.8 3.7   LSL 4900 4900 4900 4900 4900 4900 4900   Pulsatility - Intermittent pulse Intermittent pulse Intermittent pulse Intermittent pulse Intermittent pulse Intermittent pulse

## 2019-11-20 NOTE — PROGRESS NOTES
Pt lab results resulted this am. K level of 3.5. [facility] notified and stated that she was aware and would order replacements.    bed

## 2019-11-20 NOTE — PROGRESS NOTES
"Subjective:   Patient ID:  Deborah Navas is a 49 y.o. female who presents for LVAD followup visit.    Implant Date:9/10/19  Heartmate 3 RPM 5200  INR goal: 2-3   Bridge with Heparin   Antiplatelets:    Inotropes: NA        DLES:"2" with sutures  Dressing: Daily with soap and water    TXP RON INTERROGATIONS 11/19/2019   Type -   Flow -   Speed -   PI -   Power (Orellana) -   LSL -   Pulsatility Intermittent pulse       Shortness of Breath   Associated symptoms include leg swelling. Pertinent negatives include no abdominal pain, chest pain, fever, orthopnea, PND or syncope.   Nausea   Associated symptoms include nausea. Pertinent negatives include no abdominal pain, change in bowel habit, chest pain, chills, coughing, fever or myalgias.     48 yo WF with NICM: EF 20%, LVEDD: 6.8cm, V-fib reported in setting of hypokalemia with appropriate shock from ICD (on Amiodarone), HLP, being admitted from procedure room for management of cardiogenic shock and workup for advanced options. started on dobutamine infusion and evaluation for advanced options was initiated. She was accepted for VAD and underwent LVAD implantation on 9/10/2019. She had an uncomplicated postoperative course and downgraded to floor within 3 days. She continued to improve .  Minor issues with diaphragmatic stimulation of LV lead.   Left pleural effusion was noted with chest tube was inserted. Chest tube removed on 9/30 with stable small pleural effusion on 10/1.      Patient is extremely short of breath today in clinic, weight up dramatically, having increased SOB at rest and talking even. Very volume up on legs, abdomen, weight quite elevated.     Review of Systems   Constitution: Positive for decreased appetite and weight gain (weight up quite a bit from last visit). Negative for chills, fever, malaise/fatigue and weight loss.   HENT: Negative.    Eyes: Negative.    Cardiovascular: Positive for dyspnea on exertion (with minimal exertion) " "and leg swelling. Negative for chest pain, near-syncope, orthopnea, palpitations, paroxysmal nocturnal dyspnea and syncope.   Respiratory: Positive for shortness of breath. Negative for cough.    Endocrine: Negative.    Skin: Negative.    Musculoskeletal: Negative.  Negative for myalgias.   Gastrointestinal: Positive for nausea. Negative for bloating, abdominal pain, change in bowel habit and jaundice.   Neurological: Negative for dizziness and light-headedness.   Psychiatric/Behavioral: Negative for depression.       Objective:   Blood pressure (!) 85/0, pulse 60, temperature 98.5 °F (36.9 °C), temperature source Oral, height 5' 7" (1.702 m), weight 106.1 kg (234 lb), SpO2 99 %.body mass index is 36.65 kg/m².    Physical Exam   Constitutional: She is oriented to person, place, and time. She appears well-developed and well-nourished. No distress.   HENT:   Head: Normocephalic and atraumatic. Head is without abrasion and without contusion.   Right Ear: External ear normal.   Left Ear: External ear normal.   Nose: Nose normal. No epistaxis.   Mouth/Throat: Oropharynx is clear and moist. Mucous membranes are not cyanotic.   Eyes: Pupils are equal, round, and reactive to light. Conjunctivae and EOM are normal. Right eye exhibits no discharge. Left eye exhibits no discharge.   Neck: Normal range of motion. Neck supple. Hepatojugular reflux (very mild ) and JVD (JVP 6 ) present. No tracheal deviation present. No thyromegaly present.   Cardiovascular: Normal rate, regular rhythm, normal heart sounds and normal pulses. Exam reveals no gallop and no friction rub.   No murmur heard.  Normal VAD hum     Pulmonary/Chest: Effort normal. No stridor. No respiratory distress. She has decreased breath sounds in the left lower field. She has no wheezes. She has no rales.   Abdominal: Soft. Normal appearance, normal aorta and bowel sounds are normal. She exhibits no distension. There is no tenderness.   Musculoskeletal: Normal range " of motion. She exhibits no edema or tenderness.   Neurological: She is alert and oriented to person, place, and time. She has normal strength and normal reflexes. No cranial nerve deficit. She exhibits normal muscle tone. Coordination normal.   Skin: Skin is warm and dry. No rash noted. No erythema.   Psychiatric: She has a normal mood and affect. Her speech is normal and behavior is normal. Judgment and thought content normal. Cognition and memory are normal.     Lab Results   Component Value Date    WBC 4.69 11/19/2019    HGB 11.3 (L) 11/19/2019    HCT 40.6 11/19/2019    MCV 88 11/19/2019     11/19/2019    CO2 27 11/19/2019    CREATININE 1.4 11/19/2019    CALCIUM 9.2 11/19/2019    ALBUMIN 3.6 11/18/2019    AST 17 11/18/2019     (H) 11/18/2019    ALT 13 11/18/2019     11/19/2019       Lab Results   Component Value Date    INR 1.9 (H) 11/19/2019    INR 1.8 (H) 11/18/2019    INR 2.0 (H) 11/17/2019       BNP   Date Value Ref Range Status   11/18/2019 331 (H) 0 - 99 pg/mL Final     Comment:     Values of less than 100 pg/ml are consistent with non-CHF populations.   11/15/2019 207 (H) 0 - 99 pg/mL Final     Comment:     Values of less than 100 pg/ml are consistent with non-CHF populations.   11/13/2019 661 (H) 0 - 99 pg/mL Final     Comment:     Values of less than 100 pg/ml are consistent with non-CHF populations.       LD   Date Value Ref Range Status   11/19/2019 195 110 - 260 U/L Final     Comment:     Results are increased in hemolyzed samples.   11/18/2019 222 110 - 260 U/L Final     Comment:     Results are increased in hemolyzed samples.   11/17/2019 220 110 - 260 U/L Final     Comment:     Results are increased in hemolyzed samples.       Assessment:      1. LVAD (left ventricular assist device) present    2. Acute on chronic combined systolic and diastolic heart failure    3. History of ventricular fibrillation    4. ICD (implantable cardioverter-defibrillator) in place    5. Essential  hypertension        Plan:   Patient is significantly volume overloaded and interrogation of device demonstrates low flows everyday. Will admit to inpatient team, concerned about her volume increase and low flows. May need speed adjustment.   Patient is now NYHA III  Recommend 2 gram sodium restriction and 1500cc fluid restriction.  Encourage physical activity with graded exercise program.  Requested patient to weigh themselves daily, and to notify us if their weight increases by more than 3 lbs in 1 day or 5 lbs in 1 week.     Listed for transplant: No

## 2019-11-20 NOTE — PROCEDURES
TXP RON INTERROGATIONS 11/19/2019 11/19/2019 11/19/2019 11/19/2019 11/18/2019 11/18/2019 11/18/2019   Type - - - - - - -   Flow - - - - - - -   Speed - - - - - - -   PI - - - - - - -   Power (Orellana) - - - - - - -   LSL - - - - - - -   Pulsatility Intermittent pulse Intermittent pulse Intermittent pulse Intermittent pulse Intermittent pulse Intermittent pulse Intermittent pulse   }

## 2019-11-20 NOTE — NURSING
Per Dr Chaudhari, the remaining evaluation tests/consults should be ordered. Per , pt's previous auth for OHT eval is still valid.  OK to order evaluation tests/consults.

## 2019-11-20 NOTE — PLAN OF CARE
Pt remained free of injuries, falls, and trauma. VS stable. No complaints of pain or sob. LVAD numbers and dopplers WDL. No Low flow alarms during shift. Pt on lasix gtt at 15mg/hr or 7.5ml/hr. Pt diuresing well. Pt driveline exit site dressing changed by RN. Dressing CDI and WDL. Plan of care reviewed with pt. Pt verbalized understanding. All questions and concerns addressed. Will continue to monitor.

## 2019-11-20 NOTE — NURSING TRANSFER
Nursing Transfer Note      11/20/2019     Transfer To: [id number] from 309    Transfer via bed    Transfer with cardiac monitoring    Transported by Germaine CERVANTES, Cindy PCT    Medicines sent: lasix gtt    Chart send with patient: Yes    Notified: Patient notified mother

## 2019-11-20 NOTE — NURSING
Julieth Ramos notified of patient having a low flow alarm while laying with the head of bed elevated. Patient was asymptomatic.    Flow 2.3  Speed 5300  PI 3.5  Power 7.1  Doppler 60/0

## 2019-11-20 NOTE — NURSING
"Pt sitting up in bed, states that she had a LFA this morning when repositioning in bed. Pt also admits to feeling her ICD "either doing something wrong or it's working and getting me out of a bad rhythm." Upon VAD interrogation, pt is having frequent PI events with occasional speed drops. Waveforms from 11/18 reveals that the pump is seeing a reduction in blood volume I informed ZAC Gamez. ICD interrogated and revealed ATP. Plans for pt to be moved to ICU for epi and Simon.  "

## 2019-11-20 NOTE — NURSING TRANSFER
Nursing Transfer Note      11/20/2019     Transfer to [facility] 6071 from 309    Transfer via bed    Transfer with tele/VAD equipement    Transported by [email or web address]. PCTx1    Medicines sent: lasix infusing    Chart send with patient: yes    Patient reassessed at: 16:00 11/20/2019    Upon arrival to floor: Pt connect to power module. Connected to monitor in room. Ambulated to bed. To complaints. No s/s distress VAD flow currently @ 4.0 WCTM.

## 2019-11-20 NOTE — ASSESSMENT & PLAN NOTE
-Patient is non-pulsatile  -Doppler goal 60-90 mmHg  -Antihypertensive medications include  Lisinopril  -Hydralazine was started on evening of admit and then changed to low dose Norvasc and Spironolactone to aid with BP and low K+.  Hesitated on increasing Lisinopril because of active diuresis and concern for fluctuation of creatinine    - amlodipine now on hold in setting of symptomatic hypotension and LFAs on 11/16  - If BP remains stable would like to increase ACE/add isosorbide/hydral for afterload reduction is setting of elevated wedge, will continue with diuresis for now.

## 2019-11-20 NOTE — ASSESSMENT & PLAN NOTE
-hx low flow alarms prior to admit, last LFA 11/18  -Possibly secondary to ADHF.  -Echo shows IVS shifting to right side and LVED increased to 6.4.    -Formal report of CT unremarkable; however, it was a non contrast as creat was elevated above baseline on admit and inflow and outflow cannulas not accurately visualized    - consider repeating when creatinine improves w/ contrast vs positional NADINE? Though LFAs may be related to worsening RV function

## 2019-11-20 NOTE — PROGRESS NOTES
Notified Dr. Chau, HTS., of the patient's HR in the 150's. Patient is asymptomatic, VSS. Will continue to monitor.

## 2019-11-20 NOTE — SUBJECTIVE & OBJECTIVE
Interval History: One asymptomatic low flow alarm this AM while I was speaking with p[atient this AM. Patient was sitting upright when alarm went off. Denies NVD. Abdominal fullness has improved. NSVT overnight and some palpitations this AM. Consider starting  but will interrogate ICD to determine true VT burden.     Scheduled Meds:   amiodarone  400 mg Oral Daily    aspirin  325 mg Oral Daily    gabapentin  200 mg Oral BID    lisinopril  10 mg Oral BID    magnesium oxide  400 mg Oral Daily    mexiletine  150 mg Oral Q8H    mirtazapine  15 mg Oral QHS    pantoprazole  40 mg Oral Daily    spironolactone  12.5 mg Oral Daily    venlafaxine  150 mg Oral Daily    warfarin  1.5 mg Oral Daily     PRN Meds:sodium chloride 0.9%, ALPRAZolam, pneumoc 13-amber conj-dip cr(PF), promethazine, senna-docusate 8.6-50 mg, zolpidem    Review of patient's allergies indicates:   Allergen Reactions    Adhesive Blisters     Reaction to area in chest and up only    Codeine Itching     Objective:     Vital Signs (Most Recent):  Temp: 97.9 °F (36.6 °C) (11/20/19 0839)  Pulse: 84 (11/20/19 0743)  Resp: 16 (11/20/19 0839)  BP: (!) 74/0 (11/20/19 0839)  SpO2: 98 % (11/20/19 0839) Vital Signs (24h Range):  Temp:  [97.4 °F (36.3 °C)-98.1 °F (36.7 °C)] 97.9 °F (36.6 °C)  Pulse:  [] 84  Resp:  [16-17] 16  SpO2:  [91 %-99 %] 98 %  BP: (74-86)/(0) 74/0     Patient Vitals for the past 72 hrs (Last 3 readings):   Weight   11/20/19 0500 100.3 kg (221 lb 1.9 oz)   11/19/19 0500 103.9 kg (229 lb 0.9 oz)   11/18/19 0500 103.7 kg (228 lb 9.9 oz)     Body mass index is 34.63 kg/m².      Intake/Output Summary (Last 24 hours) at 11/20/2019 1021  Last data filed at 11/20/2019 0500  Gross per 24 hour   Intake 660 ml   Output 5400 ml   Net -4740 ml     Hemodynamic Parameters:    Telemetry: NSR      Physical Exam  Constitutional: laying in bed NAD  Neck: Normal range of motion. Neck supple. JVP difficult to appreciate,  jaw?  Cardiovascular: Normal rate, regular rhythm, Smooth VAD hum. No murmur heard.  Pulmonary/Chest: Effort normal. No stridor. No respiratory distress. She has decreased breath sounds in the left lower field. She has no wheezes. She has no rales.   Abdominal: Soft.Obese appearance, bowel sounds are normal. She exhibits some distension. There is no tenderness.   Musculoskeletal: Normal range of motion. She exhibits no edema or tenderness.   Neurological: She is alert and oriented to person, place, and time. She has normal strength and normal reflexes. Coordination normal.   Skin: Skin is warm and dry. No rash noted. No erythema      Significant Labs:  CBC:  Recent Labs   Lab 11/18/19 0412 11/19/19 0444 11/20/19  0605   WBC 4.68 4.69 4.54   RBC 4.87 4.61 4.98   HGB 12.0 11.3* 12.6   HCT 42.1 40.6 42.3    306 356*   MCV 86 88 85   MCH 24.6* 24.5* 25.3*   MCHC 28.5* 27.8* 29.8*     BNP:  Recent Labs   Lab 11/15/19  0428 11/18/19  0412 11/20/19  0606   * 331* 241*     CMP:  Recent Labs   Lab 11/15/19  0428  11/18/19  0411 11/18/19  0412 11/19/19  0444 11/20/19  0606   GLU 96   < > 111*  --  87 141*   CALCIUM 9.4   < > 9.7  --  9.2 9.8   ALBUMIN 3.4*  --   --  3.6  --  3.8   PROT 7.2  --   --  7.3  --  7.9      < > 137  --  141 142   K 3.6   < > 4.0  --  4.2 3.5   CO2 25   < > 24  --  27 25      < > 103  --  107 102   BUN 21*   < > 34*  --  29* 31*   CREATININE 1.7*   < > 1.7*  --  1.4 1.7*   ALKPHOS 91  --   --  93  --  97   ALT 12  --   --  13  --  15   AST 15  --   --  17  --  22   BILITOT 0.5  --   --  0.4  --  0.5    < > = values in this interval not displayed.      Coagulation:   Recent Labs   Lab 11/18/19 0411 11/19/19  0444 11/20/19  0606   INR 1.8* 1.9* 2.2*   APTT 29.8 30.2 30.1     LDH:  Recent Labs   Lab 11/18/19 0411 11/19/19 0444 11/20/19  0605    195 249     Microbiology:  Microbiology Results (last 7 days)     ** No results found for the last 168 hours. **          I  have reviewed all pertinent labs within the past 24 hours.    Estimated Creatinine Clearance: 48.2 mL/min (A) (based on SCr of 1.7 mg/dL (H)).    Diagnostic Results:  I have reviewed and interpreted all pertinent imaging results/findings within the past 24 hours.

## 2019-11-20 NOTE — PROGRESS NOTES
Update Note    SWI to see pt for update. Pt presented as aaox4 with pleasant affect. SWI informed pt of orders faxed to [facility]  by CAROLINA Austin LCSW. Pt expressed understanding. Pt reports coping well and denies further needs, questions, concerns at this time and none indicated. Providing ongoing psychosocial and counseling support, education, resources, assistance and discharge planning as indicated. Following and available.

## 2019-11-20 NOTE — PROGRESS NOTES
[facility]  Heart Transplant  Progress Note    Patient Name: Deborah Navas  MRN: [id number]  Admission Date: 11/13/2019  Hospital Length of Stay: 5 days  Attending Physician: Vi Bryant MD  Primary Care Provider: Primary Doctor No  Principal Problem:Left ventricular assist device (LVAD) complication    Subjective:     Interval History: One asymptomatic low flow alarm this AM while I was speaking with p[atient this AM. Patient was sitting upright when alarm went off. Denies NVD. Abdominal fullness has improved. NSVT overnight and some palpitations this AM. Consider starting  but will interrogate ICD to determine true VT burden.     Scheduled Meds:   amiodarone  400 mg Oral Daily    aspirin  325 mg Oral Daily    gabapentin  200 mg Oral BID    lisinopril  10 mg Oral BID    magnesium oxide  400 mg Oral Daily    mexiletine  150 mg Oral Q8H    mirtazapine  15 mg Oral QHS    pantoprazole  40 mg Oral Daily    spironolactone  12.5 mg Oral Daily    venlafaxine  150 mg Oral Daily    warfarin  1.5 mg Oral Daily     PRN Meds:sodium chloride 0.9%, ALPRAZolam, pneumoc 13-amber conj-dip cr(PF), promethazine, senna-docusate 8.6-50 mg, zolpidem    Review of patient's allergies indicates:   Allergen Reactions    Adhesive Blisters     Reaction to area in chest and up only    Codeine Itching     Objective:     Vital Signs (Most Recent):  Temp: 97.9 °F (36.6 °C) (11/20/19 0839)  Pulse: 84 (11/20/19 0743)  Resp: 16 (11/20/19 0839)  BP: (!) 74/0 (11/20/19 0839)  SpO2: 98 % (11/20/19 0839) Vital Signs (24h Range):  Temp:  [97.4 °F (36.3 °C)-98.1 °F (36.7 °C)] 97.9 °F (36.6 °C)  Pulse:  [] 84  Resp:  [16-17] 16  SpO2:  [91 %-99 %] 98 %  BP: (74-86)/(0) 74/0     Patient Vitals for the past 72 hrs (Last 3 readings):   Weight   11/20/19 0500 100.3 kg (221 lb 1.9 oz)   11/19/19 0500 103.9 kg (229 lb 0.9 oz)   11/18/19 0500 103.7 kg (228 lb 9.9 oz)     Body mass index is 34.63  kg/m².      Intake/Output Summary (Last 24 hours) at 11/20/2019 1021  Last data filed at 11/20/2019 0500  Gross per 24 hour   Intake 660 ml   Output 5400 ml   Net -4740 ml     Hemodynamic Parameters:    Telemetry: NSR      Physical Exam  Constitutional: laying in bed NAD  Neck: Normal range of motion. Neck supple. JVP difficult to appreciate, jaw?  Cardiovascular: Normal rate, regular rhythm, Smooth VAD hum. No murmur heard.  Pulmonary/Chest: Effort normal. No stridor. No respiratory distress. She has decreased breath sounds in the left lower field. She has no wheezes. She has no rales.   Abdominal: Soft.Obese appearance, bowel sounds are normal. She exhibits some distension. There is no tenderness.   Musculoskeletal: Normal range of motion. She exhibits no edema or tenderness.   Neurological: She is alert and oriented to person, place, and time. She has normal strength and normal reflexes. Coordination normal.   Skin: Skin is warm and dry. No rash noted. No erythema      Significant Labs:  CBC:  Recent Labs   Lab 11/18/19 0412 11/19/19 0444 11/20/19  0605   WBC 4.68 4.69 4.54   RBC 4.87 4.61 4.98   HGB 12.0 11.3* 12.6   HCT 42.1 40.6 42.3    306 356*   MCV 86 88 85   MCH 24.6* 24.5* 25.3*   MCHC 28.5* 27.8* 29.8*     BNP:  Recent Labs   Lab 11/15/19  0428 11/18/19  0412 11/20/19  0606   * 331* 241*     CMP:  Recent Labs   Lab 11/15/19  0428  11/18/19  0411 11/18/19  0412 11/19/19  0444 11/20/19  0606   GLU 96   < > 111*  --  87 141*   CALCIUM 9.4   < > 9.7  --  9.2 9.8   ALBUMIN 3.4*  --   --  3.6  --  3.8   PROT 7.2  --   --  7.3  --  7.9      < > 137  --  141 142   K 3.6   < > 4.0  --  4.2 3.5   CO2 25   < > 24  --  27 25      < > 103  --  107 102   BUN 21*   < > 34*  --  29* 31*   CREATININE 1.7*   < > 1.7*  --  1.4 1.7*   ALKPHOS 91  --   --  93  --  97   ALT 12  --   --  13  --  15   AST 15  --   --  17  --  22   BILITOT 0.5  --   --  0.4  --  0.5    < > = values in this interval  not displayed.      Coagulation:   Recent Labs   Lab 11/18/19  0411 11/19/19  0444 11/20/19  0606   INR 1.8* 1.9* 2.2*   APTT 29.8 30.2 30.1     LDH:  Recent Labs   Lab 11/18/19  0411 11/19/19  0444 11/20/19  0605    195 249     Microbiology:  Microbiology Results (last 7 days)     ** No results found for the last 168 hours. **          I have reviewed all pertinent labs within the past 24 hours.    Estimated Creatinine Clearance: 48.2 mL/min (A) (based on SCr of 1.7 mg/dL (H)).    Diagnostic Results:  I have reviewed and interpreted all pertinent imaging results/findings within the past 24 hours.    Assessment and Plan:     48 yo WF with NICM, s/p HM3 implantation 9/10/19 (post up coarse uncomplicated with the exception of+ diaphragmatic stimulation of LV lead and pleural effusion with chest tube placement, atrial flutter with NADINE/DCCV), V-fib reported in setting of hypokalemia with appropriate shock from ICD on Amiodarone prior to LVAD placement; and recent hospitalizations for ventricular arrythmias; started on Mexilitine.  She was seen in EP clinic today and she continues to have multiple VT episodes with some ATP therapy, although no ICD shocks since starting mexiletine 10/24/2019. One slow VT episode 2.5hrs 11/3/2019. Patient asymptomatic with LVAD. On coumadin. No AFL since post-op event (paced out of rhythm 9/24/2019). CHB with 100% V pacing (LV lead off). She does not feel well. Dry heaving with mexiletine. Taking phenergan PRN. 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   She was seen in HTS clinic and reported 4 low flow alarms the last 4 nights.  She reports they occur in her sleep and last for only a few seconds.  By the time she wakes up; they quickly stop.  She does report to possibly being little volume overloaded.  She hasn't noticed weight gain at home but thinks she may have little extra fluid.  She denies SOB, chest pain, palpitations, LEGER. In review of  her records: she was 223lbs on 10/24/19 during previous hospitalization and is 235lbs now.  Her lasix had previously been decreased to prn.     * Left ventricular assist device (LVAD) complication  -hx low flow alarms prior to admit, last LFA 11/18  -Possibly secondary to ADHF.  -Echo shows IVS shifting to right side and LVED increased to 6.4.    -Formal report of CT unremarkable; however, it was a non contrast as creat was elevated above baseline on admit and inflow and outflow cannulas not accurately visualized    - consider repeating when creatinine improves w/ contrast vs positional NADINE? Though LFAs may be related to worsening RV function    Essential hypertension  -Patient is non-pulsatile  -Doppler goal 60-90 mmHg  -Antihypertensive medications include  Lisinopril  -Hydralazine was started on evening of admit and then changed to low dose Norvasc and Spironolactone to aid with BP and low K+.  Hesitated on increasing Lisinopril because of active diuresis and concern for fluctuation of creatinine    - amlodipine now on hold in setting of symptomatic hypotension and LFAs on 11/16  - If BP remains stable would like to increase ACE/add isosorbide/hydral for afterload reduction is setting of elevated wedge, will continue with diuresis for now.    Ventricular tachycardia  - Per EP office visit on day of admission: She continues to have multiple VT episodes with some ATP therapy, although no ICD shocks since starting mexiletine 10/24/2019. One slow VT episode 2.5hrs 11/3/2019. Patient asymptomatic with LVAD. On coumadin. No AFL since post-op event (paced out of rhythm 9/24/2019). CHB with 100% V pacing (LV lead off).   - Plan was to continue current regimen despite Mexiletine making her nauseous.  - Decreased Amiodarone to daily per EP plan on discharge last hospitalization   - NSVT continues intermittently on tele, now having palpitations   - re interrogate device today as we would like to restart     LVAD (left  ventricular assist device) present  -HeartMate 3 Implanted 9/10/19 as DT.  -Implanted by Dr. Walden  -INR therapeutic. Goal INR 2.0-3.0. Continue Coumadin.   - Previous home dose was 3mg on Wed and 1.5mg QDaily  -Antiplatelets:  mg.  -LDH is stable Will monitor daily.   -Speed set at 5300, had another asymptomatic LFA on 11/20  -Not listed for OHTx: was declined due to pulmonary hypertension and incomplete care giving plan    Procedure: Device Interrogation Including analysis of device parameters  Current Settings: Ventricular Assist Device  Review of device function is stable    TXP LVAD INTERROGATIONS 11/20/2019 11/20/2019 11/19/2019 11/19/2019 11/19/2019 11/19/2019 11/19/2019   Type HeartMate3 HeartMate3 HeartMate3 HeartMate3 HeartMate3 HeartMate3 HeartMate3   Flow 3.7 3.1 3.1 4.2 3.1 4.0 3.6   Speed 5300 5300 5300 5300 5300 5300 5300   PI 2.7 3.5 3.5 3.3 2.8 3.8 4.7   Power (Orellana) 3.5 3.6 3.6 3.7 3.6 3.8 3.7   LSL 4900 4900 4900 4900 4900 4900 4900   Pulsatility - Intermittent pulse Intermittent pulse Intermittent pulse Intermittent pulse Intermittent pulse Intermittent pulse       CKD (chronic kidney disease)  - Baseline creat appears 1.3-1.6  - Will monitor with diuresis     Acute on chronic combined systolic and diastolic heart failure  -NICM  -Last 2D Echo 11/13/19: LVEF 20%, LVEDD 6.3 cm with speed at 5300 and severe MR, no adjustments today per Dr Chaudhari  -lasix continues at 15 mg/hr restarted after RHC 11/18 (RA 20, W 20, CO/CI 3.79/1.77, SVR 1500)   - consider adding  for RV support after ICD interrogation (frequent NSVT and hx of VT/ICD shocks)  - GDMT: ACE 10 BID, aldactone 12.5, consider isordil+hydral?   - patient is -4.5 L overnight and -12 L since admission   - was on lasix 20 PRN at home PTA  -2g Na dietary restriction, 1500 mL fluid restriction, strict I/Os      ICD (implantable cardioverter-defibrillator) in place  - [employer] ICD  - See above VT        Julieth Ramos PA-C  Heart  Transplant  [facility]

## 2019-11-20 NOTE — NURSING
TATIANA Ramos notified of patient 3 low flow alarms while sitting in bed. Patient reports feeling lightheaded. Will continue to monitor.

## 2019-11-20 NOTE — ASSESSMENT & PLAN NOTE
-NICM  -Last 2D Echo 11/13/19: LVEF 20%, LVEDD 6.3 cm with speed at 5300 and severe MR, no adjustments today per Dr Chaudhari  -lasix continues at 15 mg/hr restarted after RHC 11/18 (RA 20, W 20, CO/CI 3.79/1.77, SVR 1500)   - consider adding  for RV support after ICD interrogation (frequent NSVT and hx of VT/ICD shocks)  - GDMT: ACE 10 BID, aldactone 12.5, consider isordil+hydral?   - patient is -4.5 L overnight and -12 L since admission   - was on lasix 20 PRN at home PTA  -2g Na dietary restriction, 1500 mL fluid restriction, strict I/Os

## 2019-11-20 NOTE — PLAN OF CARE
Waveforms sent to [facility] consistent with low flow related to RV failure. In addition ICD interrogation w/ ATP x9 for VT. No ICD shocks. Plan to move to ICU for EPI/NO due to RV failure. Will also place central line for CVP, Svo2 monitoring ( not idea due to arrhythmias). Plan discussed with patient who agrees with move to ICU. will also move forward with completing workup for OHTx (caregiving issue has resolved).   4 LFAs this AM, felt slightly dizzy but resting comfortably this afternoon when I discussed the plan going forward. Chaplan was present and also provided spiritual support.     BALDEMAR RochaC

## 2019-11-20 NOTE — PROGRESS NOTES
11/20/2019  Terra Porter    Current provider:  Vi Bryant MD      I, Terra Porter, rounded on Deborah Navas to ensure all mechanical assist device settings (IABP or VAD) were appropriate and all parameters were within limits.  I was able to ensure all back up equipment was present, the staff had no issues, and the Perfusion Department daily rounding was complete.    9:54 AM

## 2019-11-21 LAB
ALLENS TEST: ABNORMAL
ANION GAP SERPL CALC-SCNC: 11 MMOL/L (ref 8–16)
ANION GAP SERPL CALC-SCNC: 11 MMOL/L (ref 8–16)
ANION GAP SERPL CALC-SCNC: 12 MMOL/L (ref 8–16)
ANION GAP SERPL CALC-SCNC: 12 MMOL/L (ref 8–16)
ANION GAP SERPL CALC-SCNC: 13 MMOL/L (ref 8–16)
APTT BLDCRRT: 29.6 SEC (ref 21–32)
BASOPHILS # BLD AUTO: 0.05 K/UL (ref 0–0.2)
BASOPHILS NFR BLD: 0.7 % (ref 0–1.9)
BUN SERPL-MCNC: 33 MG/DL (ref 6–20)
BUN SERPL-MCNC: 33 MG/DL (ref 6–20)
BUN SERPL-MCNC: 35 MG/DL (ref 6–20)
BUN SERPL-MCNC: 36 MG/DL (ref 6–20)
BUN SERPL-MCNC: 38 MG/DL (ref 6–20)
CALCIUM SERPL-MCNC: 9.3 MG/DL (ref 8.7–10.5)
CALCIUM SERPL-MCNC: 9.3 MG/DL (ref 8.7–10.5)
CALCIUM SERPL-MCNC: 9.5 MG/DL (ref 8.7–10.5)
CALCIUM SERPL-MCNC: 9.6 MG/DL (ref 8.7–10.5)
CALCIUM SERPL-MCNC: 9.6 MG/DL (ref 8.7–10.5)
CHLORIDE SERPL-SCNC: 101 MMOL/L (ref 95–110)
CHLORIDE SERPL-SCNC: 102 MMOL/L (ref 95–110)
CLASS I ANTIBODIES - LUMINEX: NEGATIVE
CLASS II ANTIBODIES - LUMINEX: NEGATIVE
CO2 SERPL-SCNC: 27 MMOL/L (ref 23–29)
CO2 SERPL-SCNC: 30 MMOL/L (ref 23–29)
CPRA %: 0
CREAT SERPL-MCNC: 2 MG/DL (ref 0.5–1.4)
CREAT SERPL-MCNC: 2.1 MG/DL (ref 0.5–1.4)
CREAT SERPL-MCNC: 2.2 MG/DL (ref 0.5–1.4)
DELSYS: ABNORMAL
DIFFERENTIAL METHOD: ABNORMAL
EOSINOPHIL # BLD AUTO: 0.1 K/UL (ref 0–0.5)
EOSINOPHIL NFR BLD: 1.9 % (ref 0–8)
ERYTHROCYTE [DISTWIDTH] IN BLOOD BY AUTOMATED COUNT: 16.9 % (ref 11.5–14.5)
EST. GFR  (AFRICAN AMERICAN): 29.3 ML/MIN/1.73 M^2
EST. GFR  (AFRICAN AMERICAN): 31 ML/MIN/1.73 M^2
EST. GFR  (AFRICAN AMERICAN): 32.8 ML/MIN/1.73 M^2
EST. GFR  (NON AFRICAN AMERICAN): 25.4 ML/MIN/1.73 M^2
EST. GFR  (NON AFRICAN AMERICAN): 26.9 ML/MIN/1.73 M^2
EST. GFR  (NON AFRICAN AMERICAN): 28.5 ML/MIN/1.73 M^2
GLUCOSE SERPL-MCNC: 126 MG/DL (ref 70–110)
GLUCOSE SERPL-MCNC: 126 MG/DL (ref 70–110)
GLUCOSE SERPL-MCNC: 137 MG/DL (ref 70–110)
GLUCOSE SERPL-MCNC: 137 MG/DL (ref 70–110)
GLUCOSE SERPL-MCNC: 182 MG/DL (ref 70–110)
HBV CORE AB SERPL QL IA: NEGATIVE
HBV SURFACE AB SER-ACNC: POSITIVE M[IU]/ML
HBV SURFACE AG SERPL QL IA: NEGATIVE
HCO3 UR-SCNC: 30.7 MMOL/L (ref 24–28)
HCT VFR BLD AUTO: 41.5 % (ref 37–48.5)
HCV AB SERPL QL IA: NEGATIVE
HEPATITIS A ANTIBODY, IGG: NEGATIVE
HGB BLD-MCNC: 12 G/DL (ref 12–16)
HIV 1+2 AB+HIV1 P24 AG SERPL QL IA: NEGATIVE
HPRA INTERPRETATION: NORMAL
IMM GRANULOCYTES # BLD AUTO: 0.03 K/UL (ref 0–0.04)
IMM GRANULOCYTES NFR BLD AUTO: 0.4 % (ref 0–0.5)
INR PPP: 2.1 (ref 0.8–1.2)
LDH SERPL L TO P-CCNC: 238 U/L (ref 110–260)
LYMPHOCYTES # BLD AUTO: 1.3 K/UL (ref 1–4.8)
LYMPHOCYTES NFR BLD: 18.9 % (ref 18–48)
MAGNESIUM SERPL-MCNC: 2.1 MG/DL (ref 1.6–2.6)
MAGNESIUM SERPL-MCNC: 2.1 MG/DL (ref 1.6–2.6)
MAGNESIUM SERPL-MCNC: 2.2 MG/DL (ref 1.6–2.6)
MAGNESIUM SERPL-MCNC: 2.2 MG/DL (ref 1.6–2.6)
MCH RBC QN AUTO: 24.7 PG (ref 27–31)
MCHC RBC AUTO-ENTMCNC: 28.9 G/DL (ref 32–36)
MCV RBC AUTO: 85 FL (ref 82–98)
METHEMOGLOBIN: 0.4 % (ref 0–3)
MONOCYTES # BLD AUTO: 0.6 K/UL (ref 0.3–1)
MONOCYTES NFR BLD: 7.9 % (ref 4–15)
NEUTROPHILS # BLD AUTO: 4.9 K/UL (ref 1.8–7.7)
NEUTROPHILS NFR BLD: 70.2 % (ref 38–73)
NRBC BLD-RTO: 0 /100 WBC
PCO2 BLDA: 51.6 MMHG (ref 35–45)
PH SMN: 7.38 [PH] (ref 7.35–7.45)
PHOSPHATE SERPL-MCNC: 5.6 MG/DL (ref 2.7–4.5)
PLATELET # BLD AUTO: 354 K/UL (ref 150–350)
PMV BLD AUTO: 9.6 FL (ref 9.2–12.9)
PO2 BLDA: 39 MMHG (ref 40–60)
POC BE: 6 MMOL/L
POC SATURATED O2: 72 % (ref 95–100)
POC TCO2: 32 MMOL/L (ref 24–29)
POTASSIUM SERPL-SCNC: 3.7 MMOL/L (ref 3.5–5.1)
POTASSIUM SERPL-SCNC: 3.7 MMOL/L (ref 3.5–5.1)
POTASSIUM SERPL-SCNC: 3.8 MMOL/L (ref 3.5–5.1)
POTASSIUM SERPL-SCNC: 3.9 MMOL/L (ref 3.5–5.1)
POTASSIUM SERPL-SCNC: 4.3 MMOL/L (ref 3.5–5.1)
PROTHROMBIN TIME: 20 SEC (ref 9–12.5)
RBC # BLD AUTO: 4.86 M/UL (ref 4–5.4)
RPR SER QL: NORMAL
SAMPLE: ABNORMAL
SERUM COLLECTION DT - LUMINEX CLASS I: NORMAL
SERUM COLLECTION DT - LUMINEX CLASS II: NORMAL
SITE: ABNORMAL
SODIUM SERPL-SCNC: 139 MMOL/L (ref 136–145)
SODIUM SERPL-SCNC: 140 MMOL/L (ref 136–145)
SODIUM SERPL-SCNC: 140 MMOL/L (ref 136–145)
SODIUM SERPL-SCNC: 142 MMOL/L (ref 136–145)
SODIUM SERPL-SCNC: 142 MMOL/L (ref 136–145)
SPCL1 TESTING DATE: NORMAL
SPCL2 TESTING DATE: NORMAL
SPLUA TESTING DATE: NORMAL
WBC # BLD AUTO: 6.92 K/UL (ref 3.9–12.7)

## 2019-11-21 PROCEDURE — 93750 INTERROGATION VAD IN PERSON: CPT | Mod: NTX,,, | Performed by: INTERNAL MEDICINE

## 2019-11-21 PROCEDURE — 63600175 PHARM REV CODE 636 W HCPCS: Mod: NTX | Performed by: PHYSICIAN ASSISTANT

## 2019-11-21 PROCEDURE — 99291 PR CRITICAL CARE, E/M 30-74 MINUTES: ICD-10-PCS | Mod: NTX,,, | Performed by: PHYSICIAN ASSISTANT

## 2019-11-21 PROCEDURE — 86480 TB TEST CELL IMMUN MEASURE: CPT | Mod: NTX

## 2019-11-21 PROCEDURE — 90472 IMMUNIZATION ADMIN EACH ADD: CPT | Mod: NTX | Performed by: PHYSICIAN ASSISTANT

## 2019-11-21 PROCEDURE — 85610 PROTHROMBIN TIME: CPT | Mod: NTX

## 2019-11-21 PROCEDURE — 85730 THROMBOPLASTIN TIME PARTIAL: CPT | Mod: NTX

## 2019-11-21 PROCEDURE — 27000221 HC OXYGEN, UP TO 24 HOURS: Mod: NTX

## 2019-11-21 PROCEDURE — 99291 CRITICAL CARE FIRST HOUR: CPT | Mod: NTX,,, | Performed by: PHYSICIAN ASSISTANT

## 2019-11-21 PROCEDURE — 83615 LACTATE (LD) (LDH) ENZYME: CPT | Mod: NTX

## 2019-11-21 PROCEDURE — 93750 PR INTERROGATE VENT ASSIST DEV, IN PERSON, W PHYSICIAN ANALYSIS: ICD-10-PCS | Mod: NTX,,, | Performed by: INTERNAL MEDICINE

## 2019-11-21 PROCEDURE — 83735 ASSAY OF MAGNESIUM: CPT | Mod: 91,NTX

## 2019-11-21 PROCEDURE — 20000000 HC ICU ROOM: Mod: NTX

## 2019-11-21 PROCEDURE — 63600175 PHARM REV CODE 636 W HCPCS: Mod: NTX | Performed by: STUDENT IN AN ORGANIZED HEALTH CARE EDUCATION/TRAINING PROGRAM

## 2019-11-21 PROCEDURE — 94761 N-INVAS EAR/PLS OXIMETRY MLT: CPT | Mod: NTX

## 2019-11-21 PROCEDURE — 99223 1ST HOSP IP/OBS HIGH 75: CPT | Mod: NTX,,, | Performed by: PHYSICIAN ASSISTANT

## 2019-11-21 PROCEDURE — 27000248 HC VAD-ADDITIONAL DAY: Mod: NTX

## 2019-11-21 PROCEDURE — 90670 PCV13 VACCINE IM: CPT | Mod: NTX | Performed by: PHYSICIAN ASSISTANT

## 2019-11-21 PROCEDURE — 99223 PR INITIAL HOSPITAL CARE,LEVL III: ICD-10-PCS | Mod: NTX,,, | Performed by: PHYSICIAN ASSISTANT

## 2019-11-21 PROCEDURE — 80048 BASIC METABOLIC PNL TOTAL CA: CPT | Mod: NTX

## 2019-11-21 PROCEDURE — 99292 CRITICAL CARE ADDL 30 MIN: CPT | Mod: 25,NTX,, | Performed by: INTERNAL MEDICINE

## 2019-11-21 PROCEDURE — 25000003 PHARM REV CODE 250: Mod: NTX | Performed by: INTERNAL MEDICINE

## 2019-11-21 PROCEDURE — 82803 BLOOD GASES ANY COMBINATION: CPT | Mod: NTX

## 2019-11-21 PROCEDURE — 90715 TDAP VACCINE 7 YRS/> IM: CPT | Mod: NTX | Performed by: PHYSICIAN ASSISTANT

## 2019-11-21 PROCEDURE — 99292 PR CRITICAL CARE, ADDL 30 MIN: ICD-10-PCS | Mod: 25,NTX,, | Performed by: INTERNAL MEDICINE

## 2019-11-21 PROCEDURE — 80048 BASIC METABOLIC PNL TOTAL CA: CPT | Mod: 91,NTX

## 2019-11-21 PROCEDURE — 83050 HGB METHEMOGLOBIN QUAN: CPT | Mod: NTX

## 2019-11-21 PROCEDURE — 25000003 PHARM REV CODE 250: Mod: NTX | Performed by: PHYSICIAN ASSISTANT

## 2019-11-21 PROCEDURE — 63600367 HC NITRIC OXIDE PER HOUR: Mod: NTX

## 2019-11-21 PROCEDURE — 84100 ASSAY OF PHOSPHORUS: CPT | Mod: NTX

## 2019-11-21 PROCEDURE — 25000003 PHARM REV CODE 250: Mod: NTX | Performed by: STUDENT IN AN ORGANIZED HEALTH CARE EDUCATION/TRAINING PROGRAM

## 2019-11-21 PROCEDURE — 90471 IMMUNIZATION ADMIN: CPT | Mod: NTX | Performed by: PHYSICIAN ASSISTANT

## 2019-11-21 PROCEDURE — 83735 ASSAY OF MAGNESIUM: CPT | Mod: NTX

## 2019-11-21 PROCEDURE — 99900035 HC TECH TIME PER 15 MIN (STAT): Mod: NTX

## 2019-11-21 PROCEDURE — 85025 COMPLETE CBC W/AUTO DIFF WBC: CPT | Mod: NTX

## 2019-11-21 RX ORDER — FUROSEMIDE 10 MG/ML
80 INJECTION INTRAMUSCULAR; INTRAVENOUS 2 TIMES DAILY
Status: DISCONTINUED | OUTPATIENT
Start: 2019-11-21 | End: 2019-11-26

## 2019-11-21 RX ORDER — POTASSIUM CHLORIDE 29.8 MG/ML
40 INJECTION INTRAVENOUS ONCE
Status: COMPLETED | OUTPATIENT
Start: 2019-11-21 | End: 2019-11-21

## 2019-11-21 RX ORDER — WARFARIN 2 MG/1
2 TABLET ORAL DAILY
Status: DISCONTINUED | OUTPATIENT
Start: 2019-11-21 | End: 2019-11-25

## 2019-11-21 RX ORDER — POTASSIUM CHLORIDE 20 MEQ/1
20 TABLET, EXTENDED RELEASE ORAL ONCE
Status: COMPLETED | OUTPATIENT
Start: 2019-11-21 | End: 2019-11-21

## 2019-11-21 RX ADMIN — LISINOPRIL 10 MG: 10 TABLET ORAL at 08:11

## 2019-11-21 RX ADMIN — EPINEPHRINE 0.02 MCG/KG/MIN: 1 INJECTION PARENTERAL at 09:11

## 2019-11-21 RX ADMIN — ALPRAZOLAM 0.25 MG: 0.25 TABLET ORAL at 11:11

## 2019-11-21 RX ADMIN — MEXILETINE HYDROCHLORIDE 150 MG: 150 CAPSULE ORAL at 02:11

## 2019-11-21 RX ADMIN — POTASSIUM CHLORIDE 40 MEQ: 400 INJECTION, SOLUTION INTRAVENOUS at 02:11

## 2019-11-21 RX ADMIN — AMIODARONE HYDROCHLORIDE 400 MG: 200 TABLET ORAL at 08:11

## 2019-11-21 RX ADMIN — CLOSTRIDIUM TETANI TOXOID ANTIGEN (FORMALDEHYDE INACTIVATED), CORYNEBACTERIUM DIPHTHERIAE TOXOID ANTIGEN (FORMALDEHYDE INACTIVATED), BORDETELLA PERTUSSIS TOXOID ANTIGEN (GLUTARALDEHYDE INACTIVATED), BORDETELLA PERTUSSIS FILAMENTOUS HEMAGGLUTININ ANTIGEN (FORMALDEHYDE INACTIVATED), BORDETELLA PERTUSSIS PERTACTIN ANTIGEN, AND BORDETELLA PERTUSSIS FIMBRIAE 2/3 ANTIGEN 0.5 ML: 5; 2; 2.5; 5; 3; 5 INJECTION, SUSPENSION INTRAMUSCULAR at 12:11

## 2019-11-21 RX ADMIN — ASPIRIN 325 MG: 325 TABLET, DELAYED RELEASE ORAL at 08:11

## 2019-11-21 RX ADMIN — GABAPENTIN 200 MG: 100 CAPSULE ORAL at 09:11

## 2019-11-21 RX ADMIN — POTASSIUM CHLORIDE 20 MEQ: 1500 TABLET, EXTENDED RELEASE ORAL at 10:11

## 2019-11-21 RX ADMIN — VENLAFAXINE 150 MG: 37.5 TABLET ORAL at 08:11

## 2019-11-21 RX ADMIN — WARFARIN SODIUM 2 MG: 2 TABLET ORAL at 05:11

## 2019-11-21 RX ADMIN — MAGNESIUM OXIDE TAB 400 MG (241.3 MG ELEMENTAL MG) 400 MG: 400 (241.3 MG) TAB at 08:11

## 2019-11-21 RX ADMIN — EPINEPHRINE 0.02 MCG/KG/MIN: 1 INJECTION PARENTERAL at 01:11

## 2019-11-21 RX ADMIN — LISINOPRIL 10 MG: 10 TABLET ORAL at 09:11

## 2019-11-21 RX ADMIN — MIRTAZAPINE 15 MG: 15 TABLET, FILM COATED ORAL at 09:11

## 2019-11-21 RX ADMIN — PANTOPRAZOLE SODIUM 40 MG: 40 TABLET, DELAYED RELEASE ORAL at 08:11

## 2019-11-21 RX ADMIN — GABAPENTIN 200 MG: 100 CAPSULE ORAL at 08:11

## 2019-11-21 RX ADMIN — FUROSEMIDE 80 MG: 10 INJECTION, SOLUTION INTRAMUSCULAR; INTRAVENOUS at 05:11

## 2019-11-21 RX ADMIN — MEXILETINE HYDROCHLORIDE 150 MG: 150 CAPSULE ORAL at 09:11

## 2019-11-21 RX ADMIN — MEXILETINE HYDROCHLORIDE 150 MG: 150 CAPSULE ORAL at 05:11

## 2019-11-21 RX ADMIN — PNEUMOCOCCAL 13-VALENT CONJUGATE VACCINE 0.5 ML: 2.2; 2.2; 2.2; 2.2; 2.2; 4.4; 2.2; 2.2; 2.2; 2.2; 2.2; 2.2; 2.2 INJECTION, SUSPENSION INTRAMUSCULAR at 11:11

## 2019-11-21 RX ADMIN — SPIRONOLACTONE 12.5 MG: 25 TABLET, FILM COATED ORAL at 08:11

## 2019-11-21 RX ADMIN — SENNOSIDES AND DOCUSATE SODIUM 2 TABLET: 8.6; 5 TABLET ORAL at 08:11

## 2019-11-21 RX ADMIN — FUROSEMIDE 20 MG/HR: 10 INJECTION, SOLUTION INTRAMUSCULAR; INTRAVENOUS at 12:11

## 2019-11-21 RX ADMIN — FUROSEMIDE 20 MG/HR: 10 INJECTION, SOLUTION INTRAMUSCULAR; INTRAVENOUS at 08:11

## 2019-11-21 NOTE — SUBJECTIVE & OBJECTIVE
Interval History: Moved to ICU late yesterday for RV support and central line placement. Denies NVD, Chest pain, SOB today. States her intermittent dizziness has improved. Otherwise feels no different.     Scheduled Meds:   amiodarone  400 mg Oral Daily    aspirin  325 mg Oral Daily    gabapentin  200 mg Oral BID    hepatitis A virus vaccine (PF)  1,440 Units Intramuscular Once    lisinopril  10 mg Oral BID    magnesium oxide  400 mg Oral Daily    mexiletine  150 mg Oral Q8H    mirtazapine  15 mg Oral QHS    pantoprazole  40 mg Oral Daily    spironolactone  12.5 mg Oral Daily    DIPH,PERTUS (ADACEL),TETANUS PF VAC (ADULT)  0.5 mL Intramuscular Once    venlafaxine  150 mg Oral Daily    warfarin  1.5 mg Oral Daily     PRN Meds:sodium chloride 0.9%, ALPRAZolam, pneumoc 13-amber conj-dip cr(PF), promethazine, senna-docusate 8.6-50 mg, zolpidem    Review of patient's allergies indicates:   Allergen Reactions    Adhesive Blisters     Reaction to area in chest and up only    Codeine Itching     Objective:     Vital Signs (Most Recent):  Temp: 97.7 °F (36.5 °C) (11/21/19 1100)  Pulse: 87 (11/21/19 1100)  Resp: (!) 26 (11/21/19 1100)  BP: (!) 80/0 (11/21/19 1100)  SpO2: 100 % (11/21/19 0300) Vital Signs (24h Range):  Temp:  [97.7 °F (36.5 °C)-98.2 °F (36.8 °C)] 97.7 °F (36.5 °C)  Pulse:  [] 87  Resp:  [17-66] 26  SpO2:  [96 %-100 %] 100 %  BP: (68-90)/(0) 80/0     Patient Vitals for the past 72 hrs (Last 3 readings):   Weight   11/21/19 0701 99.9 kg (220 lb 3.8 oz)   11/21/19 0500 99.9 kg (220 lb 3.8 oz)   11/20/19 1600 100.8 kg (222 lb 3.6 oz)     Body mass index is 34.49 kg/m².      Intake/Output Summary (Last 24 hours) at 11/21/2019 1124  Last data filed at 11/21/2019 1100  Gross per 24 hour   Intake 1183.6 ml   Output 3450 ml   Net -2266.4 ml     Hemodynamic Parameters:    Telemetry: V paced rhythm with NSVT      Physical Exam  Constitutional: laying in bed NAD  Neck: Normal range of motion. Neck  supple. JVP upper 1/3 of neck  Cardiovascular: Normal rate, regular rhythm, Smooth VAD hum. No murmur heard.  Pulmonary/Chest: Effort normal. No stridor. No respiratory distress. She has decreased breath sounds in the left lower field. She has no wheezes. She has no rales.   Abdominal: Soft.Obese appearance, bowel sounds are normal. She exhibits some distension. There is no tenderness.   Musculoskeletal: Normal range of motion. She exhibits no edema or tenderness.   Neurological: She is alert and oriented to person, place, and time. She has normal strength and normal reflexes. Coordination normal.   Skin: Skin is warm and dry. No rash noted. No erythema      Significant Labs:  CBC:  Recent Labs   Lab 11/19/19  0444 11/20/19  0605 11/21/19  0515   WBC 4.69 4.54 6.92   RBC 4.61 4.98 4.86   HGB 11.3* 12.6 12.0   HCT 40.6 42.3 41.5    356* 354*   MCV 88 85 85   MCH 24.5* 25.3* 24.7*   MCHC 27.8* 29.8* 28.9*     BNP:  Recent Labs   Lab 11/15/19  0428 11/18/19  0412 11/20/19  0606   * 331* 241*     CMP:  Recent Labs   Lab 11/15/19  0428  11/18/19  0412  11/20/19  0606 11/21/19  0003 11/21/19  0515   GLU 96   < >  --    < > 141* 137*  137* 126*   CALCIUM 9.4   < >  --    < > 9.8 9.6  9.6 9.5   ALBUMIN 3.4*  --  3.6  --  3.8  --   --    PROT 7.2  --  7.3  --  7.9  --   --       < >  --    < > 142 140  140 142   K 3.6   < >  --    < > 3.5 3.7  3.7 4.3   CO2 25   < >  --    < > 25 27  27 27      < >  --    < > 102 101  101 102   BUN 21*   < >  --    < > 31* 33*  33* 36*   CREATININE 1.7*   < >  --    < > 1.7* 2.0*  2.0* 2.0*   ALKPHOS 91  --  93  --  97  --   --    ALT 12  --  13  --  15  --   --    AST 15  --  17  --  22  --   --    BILITOT 0.5  --  0.4  --  0.5  --   --     < > = values in this interval not displayed.      Coagulation:   Recent Labs   Lab 11/19/19 0444 11/20/19  0606 11/21/19  0515   INR 1.9* 2.2* 2.1*   APTT 30.2 30.1 29.6     LDH:  Recent Labs   Lab 11/19/19 0444  11/20/19  0605 11/21/19  0515    249 238     Microbiology:  Microbiology Results (last 7 days)     ** No results found for the last 168 hours. **          I have reviewed all pertinent labs within the past 24 hours.    Estimated Creatinine Clearance: 40.9 mL/min (A) (based on SCr of 2 mg/dL (H)).    Diagnostic Results:  I have reviewed and interpreted all pertinent imaging results/findings within the past 24 hours.

## 2019-11-21 NOTE — ASSESSMENT & PLAN NOTE
-hx low flow alarms prior to admit, last LFA 11/20  -Possibly secondary to ADHF.  -Echo shows IVS shifting to right side and LVED increased to 6.4.    -Formal report of CT unremarkable; however, it was a non contrast as creat was elevated above baseline on admit and inflow and outflow cannulas not accurately visualized

## 2019-11-21 NOTE — PLAN OF CARE
No acute events since admit to CMICU. AAOx4. No low flow alarms. See VAD flowsheet. SVO2 54%. CVP. 10. Doppler 90/0.  NSR. On RA. Good U/O. On lasix gtt. POC for potential epi or dobutamine gtt and possible nitric. WCTM.       CMICU DAILY GOALS       A: Awake    RASS: Goal - RASS Goal: 0-->alert and calm  Actual - RASS (Patel Agitation-Sedation Scale): 0-->alert and calm   Restraint necessity:    B: Breath   SBT: Not intubated   C: Coordinate A & B, analgesics/sedatives   Pain: managed    SAT: Not intubated  D: Delirium   CAM-ICU:    E: Early Mobility   MOVE Screen: Pass   Activity: Activity Management: ambulated to bathroom - L4  FAS: Feeding/Nutrition   Diet order: Diet/Nutrition Received: low saturated fat/low cholesterol,   Fluid restriction: Fluid Requirement: 1500 FR  T: Thrombus   DVT prophylaxis: VTE Required Core Measure: Pharmacological prophylaxis initiated/maintained  H: HOB Elevation   Head of Bed (HOB): HOB at 30-45 degrees  U: Ulcer Prophylaxis   GI: yes  G: Glucose control         S: Skin   Bundle compliance: yes  Bathing/Skin Care: back care, bath, chlorhexidine, bath, complete, dressed/undressed Date: 11/20/2019 day shift  B: Bowel Function   no issues   I: Indwelling Catheters   Fonseca necessity:     CVC necessity: yes   IPAD offered: Yes  D: De-escalation Antibx      Plan for the day   Possible nitric//epi  Family/Goals of care/Code Status   Code Status: Full Code     No acute events throughout day, VS and assessment per flow sheet, patient progressing towards goals as tolerated, plan of care reviewed with Deborah Navas and family, all concerns addressed, will continue to monitor.

## 2019-11-21 NOTE — PLAN OF CARE
Pt moved to ICU for epi/Simon in setting of RV failure. Central line placed 11/20/19.   CVP 10, SVO2: 54. Calculated CO 4.68, CI: 2.14, SVR 1264  Plan to start Simon ~ 10 ppm. Monitor UOP closely and recheck CVP, SVO2 in 4 hours.   Plan of care discussed with Dr. Chaudhari.     Luly Chau MD  Cardiology, PGY IV  Pager: [phone]

## 2019-11-21 NOTE — NURSING
Phoned [facility] regarding patient's drvieline site between at 2-3. No new orders at the moment. WCTM.

## 2019-11-21 NOTE — ASSESSMENT & PLAN NOTE
-NICM  -Last 2D Echo 11/13/19: LVEF 20%, LVEDD 6.3 cm with speed at 5300 and severe MR, no adjustments today per Dr Chaudhari  -lasix continues at 15 mg/hr restarted after RHC 11/18 (RA 20, W 20, CO/CI 3.79/1.77, SVR 1500)   - now on EPI .02 and NO @ 10 with svo2 improving from 50-> 70's   - decreased lasix ggt to 10 from 20 due to continued rise of Cr  - GDMT: Continue ACE 10 BID, aldactone 12.5, consider isordil+hydral   - was on lasix 20 PRN at home PTA  -2g Na dietary restriction, 1500 mL fluid restriction, strict I/Os

## 2019-11-21 NOTE — PROGRESS NOTES
[facility]  Heart Transplant  Progress Note    Patient Name: Deborah Navas  MRN: [id number]  Admission Date: 11/13/2019  Hospital Length of Stay: 6 days  Attending Physician: Renetta Chaudhari MD  Primary Care Provider: Primary Doctor No  Principal Problem:Left ventricular assist device (LVAD) complication    Subjective:     Interval History: Moved to ICU late yesterday for RV support and central line placement. Denies NVD, Chest pain, SOB today. States her intermittent dizziness has improved. Otherwise feels no different.     Scheduled Meds:   amiodarone  400 mg Oral Daily    aspirin  325 mg Oral Daily    gabapentin  200 mg Oral BID    hepatitis A virus vaccine (PF)  1,440 Units Intramuscular Once    lisinopril  10 mg Oral BID    magnesium oxide  400 mg Oral Daily    mexiletine  150 mg Oral Q8H    mirtazapine  15 mg Oral QHS    pantoprazole  40 mg Oral Daily    spironolactone  12.5 mg Oral Daily    DIPH,PERTUS (ADACEL),TETANUS PF VAC (ADULT)  0.5 mL Intramuscular Once    venlafaxine  150 mg Oral Daily    warfarin  1.5 mg Oral Daily     PRN Meds:sodium chloride 0.9%, ALPRAZolam, pneumoc 13-amber conj-dip cr(PF), promethazine, senna-docusate 8.6-50 mg, zolpidem    Review of patient's allergies indicates:   Allergen Reactions    Adhesive Blisters     Reaction to area in chest and up only    Codeine Itching     Objective:     Vital Signs (Most Recent):  Temp: 97.7 °F (36.5 °C) (11/21/19 1100)  Pulse: 87 (11/21/19 1100)  Resp: (!) 26 (11/21/19 1100)  BP: (!) 80/0 (11/21/19 1100)  SpO2: 100 % (11/21/19 0300) Vital Signs (24h Range):  Temp:  [97.7 °F (36.5 °C)-98.2 °F (36.8 °C)] 97.7 °F (36.5 °C)  Pulse:  [] 87  Resp:  [17-66] 26  SpO2:  [96 %-100 %] 100 %  BP: (68-90)/(0) 80/0     Patient Vitals for the past 72 hrs (Last 3 readings):   Weight   11/21/19 0701 99.9 kg (220 lb 3.8 oz)   11/21/19 0500 99.9 kg (220 lb 3.8 oz)   11/20/19 1600 100.8 kg (222 lb 3.6 oz)     Body mass  index is 34.49 kg/m².      Intake/Output Summary (Last 24 hours) at 11/21/2019 1124  Last data filed at 11/21/2019 1100  Gross per 24 hour   Intake 1183.6 ml   Output 3450 ml   Net -2266.4 ml     Hemodynamic Parameters:    Telemetry: V paced rhythm with NSVT      Physical Exam  Constitutional: laying in bed NAD  Neck: Normal range of motion. Neck supple. JVP upper 1/3 of neck  Cardiovascular: Normal rate, regular rhythm, Smooth VAD hum. No murmur heard.  Pulmonary/Chest: Effort normal. No stridor. No respiratory distress. She has decreased breath sounds in the left lower field. She has no wheezes. She has no rales.   Abdominal: Soft.Obese appearance, bowel sounds are normal. She exhibits some distension. There is no tenderness.   Musculoskeletal: Normal range of motion. She exhibits no edema or tenderness.   Neurological: She is alert and oriented to person, place, and time. She has normal strength and normal reflexes. Coordination normal.   Skin: Skin is warm and dry. No rash noted. No erythema      Significant Labs:  CBC:  Recent Labs   Lab 11/19/19 0444 11/20/19  0605 11/21/19  0515   WBC 4.69 4.54 6.92   RBC 4.61 4.98 4.86   HGB 11.3* 12.6 12.0   HCT 40.6 42.3 41.5    356* 354*   MCV 88 85 85   MCH 24.5* 25.3* 24.7*   MCHC 27.8* 29.8* 28.9*     BNP:  Recent Labs   Lab 11/15/19  0428 11/18/19  0412 11/20/19  0606   * 331* 241*     CMP:  Recent Labs   Lab 11/15/19  0428  11/18/19  0412  11/20/19  0606 11/21/19  0003 11/21/19  0515   GLU 96   < >  --    < > 141* 137*  137* 126*   CALCIUM 9.4   < >  --    < > 9.8 9.6  9.6 9.5   ALBUMIN 3.4*  --  3.6  --  3.8  --   --    PROT 7.2  --  7.3  --  7.9  --   --       < >  --    < > 142 140  140 142   K 3.6   < >  --    < > 3.5 3.7  3.7 4.3   CO2 25   < >  --    < > 25 27  27 27      < >  --    < > 102 101  101 102   BUN 21*   < >  --    < > 31* 33*  33* 36*   CREATININE 1.7*   < >  --    < > 1.7* 2.0*  2.0* 2.0*   ALKPHOS 91  --  93  --   97  --   --    ALT 12  --  13  --  15  --   --    AST 15  --  17  --  22  --   --    BILITOT 0.5  --  0.4  --  0.5  --   --     < > = values in this interval not displayed.      Coagulation:   Recent Labs   Lab 11/19/19 0444 11/20/19  0606 11/21/19  0515   INR 1.9* 2.2* 2.1*   APTT 30.2 30.1 29.6     LDH:  Recent Labs   Lab 11/19/19 0444 11/20/19  0605 11/21/19  0515    249 238     Microbiology:  Microbiology Results (last 7 days)     ** No results found for the last 168 hours. **          I have reviewed all pertinent labs within the past 24 hours.    Estimated Creatinine Clearance: 40.9 mL/min (A) (based on SCr of 2 mg/dL (H)).    Diagnostic Results:  I have reviewed and interpreted all pertinent imaging results/findings within the past 24 hours.    Assessment and Plan:     50 yo WF with NICM, s/p HM3 implantation 9/10/19 (post up coarse uncomplicated with the exception of+ diaphragmatic stimulation of LV lead and pleural effusion with chest tube placement, atrial flutter with NADINE/DCCV), V-fib reported in setting of hypokalemia with appropriate shock from ICD on Amiodarone prior to LVAD placement; and recent hospitalizations for ventricular arrythmias; started on Mexilitine.  She was seen in EP clinic today and she continues to have multiple VT episodes with some ATP therapy, although no ICD shocks since starting mexiletine 10/24/2019. One slow VT episode 2.5hrs 11/3/2019. Patient asymptomatic with LVAD. On coumadin. No AFL since post-op event (paced out of rhythm 9/24/2019). CHB with 100% V pacing (LV lead off). She does not feel well. Dry heaving with mexiletine. Taking phenergan PRN. 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   She was seen in HTS clinic and reported 4 low flow alarms the last 4 nights.  She reports they occur in her sleep and last for only a few seconds.  By the time she wakes up; they quickly stop.  She does report to possibly being little volume  overloaded.  She hasn't noticed weight gain at home but thinks she may have little extra fluid.  She denies SOB, chest pain, palpitations, LEGER. In review of her records: she was 223lbs on 10/24/19 during previous hospitalization and is 235lbs now.  Her lasix had previously been decreased to prn.     * Left ventricular assist device (LVAD) complication  -hx low flow alarms prior to admit, last LFA 11/20  -Possibly secondary to ADHF.  -Echo shows IVS shifting to right side and LVED increased to 6.4.    -Formal report of CT unremarkable; however, it was a non contrast as creat was elevated above baseline on admit and inflow and outflow cannulas not accurately visualized     Essential hypertension  -Patient is non-pulsatile  -Doppler goal 60-90 mmHg  -Antihypertensive medications include  Lisinopril  -Hydralazine was started on evening of admit and then changed to low dose Norvasc and Spironolactone to aid with BP and low K+.  Hesitated on increasing Lisinopril because of active diuresis and concern for fluctuation of creatinine    - amlodipine now on hold in setting of symptomatic hypotension and LFAs on 11/16  - If BP remains stable would like to increase ACE/add isosorbide/hydral for afterload reduction is setting of elevated wedge, will continue with diuresis for now.    Ventricular tachycardia  - Per EP office visit on day of admission: She continues to have multiple VT episodes with some ATP therapy, although no ICD shocks since starting mexiletine 10/24/2019. One slow VT episode 2.5hrs 11/3/2019. Patient asymptomatic with LVAD. On coumadin. No AFL since post-op event (paced out of rhythm 9/24/2019). CHB with 100% V pacing (LV lead off).   - Plan was to continue current regimen despite Mexiletine making her nauseous.  - Decreased Amiodarone to daily per EP plan on discharge last hospitalization   - NSVT continues intermittently on tele, now having palpitations  - device interrogation reviewed and in EPIC    LVAD  (left ventricular assist device) present  -HeartMate 3 Implanted 9/10/19 as DT.  -Implanted by Dr. Walden  -INR therapeutic. Goal INR 2.0-3.0. Continue Coumadin.   - Previous home dose was 3mg on Wed and 1.5mg QDaily  -Antiplatelets:  mg.  -LDH is stable Will monitor daily.   -Speed set at 5300, had another asymptomatic LFA on 11/20  -Not listed for OHTx: was declined due to pulmonary hypertension and incomplete care giving plan. Patient now has care giving plan. 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    Procedure: Device Interrogation Including analysis of device parameters  Current Settings: Ventricular Assist Device  Review of device function is stable    TXP LVAD INTERROGATIONS 11/21/2019 11/21/2019 11/21/2019 11/21/2019 11/21/2019 11/21/2019 11/21/2019   Type HeartMate3 HeartMate3 HeartMate3 HeartMate3 HeartMate3 HeartMate3 HeartMate3   Flow 3.8 4.3 3.7 4.2 3.9 4 3.9   Speed 5300 5300 5300 5300 5300 5300 5300   PI 2.4 2.6 3.7 3.0 3.8 3.6 4.1   Power (Orellana) 3.7 3.8 3.7 3.7 3.7 3.7 3.7   LSL 4900 4900 4900 4900 4900 4900 4900   Pulsatility Intermittent pulse Intermittent pulse Intermittent pulse Intermittent pulse Intermittent pulse Intermittent pulse Intermittent pulse       CKD (chronic kidney disease)  - Baseline creat appears 1.3-1.6  - Will monitor with diuresis     Acute on chronic combined systolic and diastolic heart failure  -NICM  -Last 2D Echo 11/13/19: LVEF 20%, LVEDD 6.3 cm with speed at 5300 and severe MR, no adjustments today per Dr Chaudhari  -lasix continues at 15 mg/hr restarted after RHC 11/18 (RA 20, W 20, CO/CI 3.79/1.77, SVR 1500)   - now on EPI .02 and NO @ 10 with svo2 improving from 50-> 70's   - decreased lasix ggt to 10 from 20 due to continued rise of Cr  - GDMT: Continue ACE 10 BID, aldactone 12.5, consider isordil+hydral   - was on lasix 20 PRN at home PTA  -2g Na dietary restriction, 1500 mL fluid restriction, strict I/Os      ICD (implantable  cardioverter-defibrillator) in place  - [employer]onic ICD  - last interrogation on 11/20/19 with ATP x9 for VT, no shocks  - See above VT        Julieth Ramos PA-C  Heart Transplant  [facility]    Uninterrupted Critical Care/Counseling Time (not including procedures): 45 minutes

## 2019-11-21 NOTE — NURSING
5 sec run of VTach noted on monitor, pt asymptomatic not in distress. Pt reported feeling it in her chest. ZAC Gamez at bedside and notified. VVS. KIM.

## 2019-11-21 NOTE — ASSESSMENT & PLAN NOTE
-HeartMate 3 Implanted 9/10/19 as DT.  -Implanted by Dr. Walden  -INR therapeutic. Goal INR 2.0-3.0. Continue Coumadin.   - Previous home dose was 3mg on Wed and 1.5mg QDaily  -Antiplatelets:  mg.  -LDH is stable Will monitor daily.   -Speed set at 5300, had another asymptomatic LFA on 11/20  -Not listed for OHTx: was declined due to pulmonary hypertension and incomplete care giving plan. Patient now has care giving plan. 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    Procedure: Device Interrogation Including analysis of device parameters  Current Settings: Ventricular Assist Device  Review of device function is stable    TXP LVAD INTERROGATIONS 11/21/2019 11/21/2019 11/21/2019 11/21/2019 11/21/2019 11/21/2019 11/21/2019   Type HeartMate3 HeartMate3 HeartMate3 HeartMate3 HeartMate3 HeartMate3 HeartMate3   Flow 3.8 4.3 3.7 4.2 3.9 4 3.9   Speed 5300 5300 5300 5300 5300 5300 5300   PI 2.4 2.6 3.7 3.0 3.8 3.6 4.1   Power (Orellana) 3.7 3.8 3.7 3.7 3.7 3.7 3.7   LSL 4900 4900 4900 4900 4900 4900 4900   Pulsatility Intermittent pulse Intermittent pulse Intermittent pulse Intermittent pulse Intermittent pulse Intermittent pulse Intermittent pulse

## 2019-11-21 NOTE — CONSULTS
Pre Transplant Infectious Diseases Consult  Heart Transplant Recipient Evaluation    Requesting Physician: Dr. Chaudhari    Reason for Visit:  Pre Transplant Evaluation    Organ:  Heart    Etiology of Heart Disease:  NICM    History of Prior Transplant:  No    Currently taking immunosuppressants/steroids:  No    History of Splenectomy:  No    Infectious History:  Current/recent infections or currently taking antibiotics?  No  History of recurrent infections (sinuses, throat, bladder/kidneys, intestines, skin, dental, lung, catheter (HD/PD) related, or peritonitis/SBP)?  No  Any major hospitalizations due to infection?  Yes - pneumonia 10 yrs ago  If diabetic, history of diabetic foot infection/osteomyelitis?  No  History of shingles?  No  History of STDs (syphilis, viral hepatitis, HIV)?  No  Exposure to TB or ever had a positive TB skin test?  No  History of residence in coccidioides endemic areas ([facility]?  No  Any foreign travel?  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  Yes; raw sushi occasionally  Any injectable or smoked recreational drug use?  No    Immunization History:  Childhood vaccines:  Yes  Last Flu shot: 10/2019  Tetanus/TDAP: unsure  Hepatitis A: Hep A IgG negative  Hepatitis B: Hep B S Ab positive  Prevnar-13: never  Pneumovax-23: never  Shingles (Zostavax/Shingrix): never      Serologies:  CMV IgG Interpretation   Date Value Ref Range Status   09/06/2019 Non-Reactive  Final     Hepatitis A Antibody IgG   Date Value Ref Range Status   09/06/2019 Negative  Final     Hep B Core Total Ab   Date Value Ref Range Status   09/06/2019 Negative  Final     Hep B S Ab   Date Value Ref Range Status   09/06/2019 Positive (A)  Final      Hepatitis B Surface Ag   Date Value Ref Range Status   09/06/2019 Negative Negative Final     HIV 1/2 Ag/Ab   Date Value Ref Range Status   09/06/2019 Negative Negative Final     TB Gold Plus   Date Value Ref Range Status   09/10/2019 Negative  Final     Comment:     The Nil tube value is used to determine if the patient has a  preexisting immune response which could cause a false-positive  reading on the test.  In order for a test to be valid, the   NIL tube must have a value of less than or equal to 8.0 IU/mL.  The mitogen control tube is used to assure the patient has a   healthy immune status and also serves as a control for correct  blood handling and incubation.  It is used to detect false-  negative readings.  The mitogen tube must have a gamma   interferon value of greater than or equal to 0.5 IU/mL higher  that the value of the Nil tube.  The TB antigen tubes are coated with the M. tuberculosis   specific antigens. For a test to be considered positive,the  TB antigen tube values minus the Nil tube value must be   greater than or equal to 0.35 IU/mL.  Diagnosing or excluding tuberculosis disease, and assessing  the probability of LTNI, requires a combination of   epidemiological, historical, medical, and diagnostic findings  that should be taken into account when interpreting   Quantiferon-TB Gold Plus results.       RPR   Date Value Ref Range Status   09/06/2019 Non-reactive Non-reactive Final     Strongyloides Ab IgG   Date Value Ref Range Status   09/06/2019 Negative Negative Final     Comment:     No detectable levels of IgG antibodies to Strongyloides.  Repeat testing in 1-2 weeks if clinically indicated.  Test Performed by:  [facility]  [address] [place] [address]  : Vikash Gary M.D. Ph.D.; CLIA# [id number]       Toxoplasma gondii IGG   Date Value Ref Range Status   09/06/2019 77.7 (H) 0.0 - 6.4 IU/mL Final     Toxoplasma  IGG Interpretation   Date Value Ref Range Status   09/06/2019 Reactive (A)  Final     Comment:     The results were obtained with the Immulite 2000 Toxoplasma  Quantitative IgG EIA. Values obtained from other manufacturers'  assay methods may not be used interchangeably.       Varicella Interpretation   Date Value Ref Range Status   09/06/2019 Positive (A) Negative Final     Comment:     <or=0.90     Negative        No detectable IgG antibody to Varicella zoster  by the RAHUL test. Such individuals are presumed to be   uninfected with Varicella zoster and to be susceptible to   primary infection.  0.91-1.09    Equivocal  >or=1.10     Positive        Indicates presence of detectable IgG antibody to Varicella   zoster by the RAHUL test. Indicative of previous or current   infection.          Review of Systems   Constitution: Negative for chills, decreased appetite, fever, malaise/fatigue, night sweats, weight gain and weight loss.   HENT: Negative for congestion, ear pain, hearing loss, hoarse voice, sore throat and tinnitus.    Eyes: Negative for blurred vision, redness and visual disturbance.   Cardiovascular: Negative for chest pain, leg swelling and palpitations.   Respiratory: Negative for cough, hemoptysis, shortness of breath, sputum production and wheezing.    Hematologic/Lymphatic: Negative for adenopathy. Does not bruise/bleed easily.   Skin: Negative for dry skin, itching, rash and suspicious lesions.   Musculoskeletal: Negative for back pain, joint pain, myalgias and neck pain.   Gastrointestinal: Negative for abdominal pain, constipation, diarrhea, heartburn, nausea and vomiting.   Genitourinary: Negative for dysuria, flank pain, frequency, hematuria, hesitancy and urgency.   Neurological: Negative for dizziness, headaches, numbness, paresthesias and weakness.   Psychiatric/Behavioral: Negative for depression and memory loss. The patient does not have insomnia and is not nervous/anxious.     Allergic/Immunologic: Negative for environmental allergies, HIV exposure, hives and persistent infections.     Physical Exam   Constitutional: She is oriented to person, place, and time. She appears well-developed and well-nourished. No distress.   HENT:   Head: Normocephalic and atraumatic.   Mouth/Throat: Uvula is midline, oropharynx is clear and moist and mucous membranes are normal. No oral lesions.   Eyes: Conjunctivae and EOM are normal. No scleral icterus.   Neck: Normal range of motion.   Cardiovascular: Normal rate, regular rhythm and normal heart sounds. Exam reveals no gallop and no friction rub.   No murmur heard.  VAD hum   Pulmonary/Chest: Effort normal and breath sounds normal. No respiratory distress. She has no wheezes. She has no rales.   Abdominal: Soft. Bowel sounds are normal. She exhibits no distension and no mass. There is no hepatosplenomegaly. There is no tenderness. There is no rebound and no guarding.   Musculoskeletal: Normal range of motion. She exhibits no edema.   Neurological: She is alert and oriented to person, place, and time.   Skin: Skin is warm, dry and intact. No rash noted.   Psychiatric: She has a normal mood and affect. Her behavior is normal.            Counseling:   I discussed with the patient the risk for increased susceptibility to infections following transplantation including increased risk for infection right after transplant and if rejection should occur.  The patient has been counseled on the importance of vaccinations including but not limited to a yearly flu vaccine. Patient was also instructed to encourage that family/caretakers receive their flu vaccine yearly. The patient was encouraged to contact us about any problems that may develop after immunizations and possible side effects were reviewed.     Specific guidance has been provided to the patient regarding the patient's occupation, hobbies and activities to avoid future infectious complications. These  include but are not limited to: avoiding raw/undercooked meats and seafood, avoiding unpasteurized milk/cheeses, proper (hand) hygiene, contact with animals and appropriate vaccination of animals, use of mosquito/tick precautions, avoiding walking barefoot, avoiding sick contacts, and seeking medical advice prior to foreign travel (specifically developing countries).     Transplant Candidacy:     Based on available information, there are no identified significant barriers to transplantation from an infectious disease standpoint pending acceptable serologies and subject to recommendations below.     Final determination of transplant candidacy will be made once evaluation is complete and reviewed by the Transplant Selection Committee.      ID recommendations:      All serologies reviewed and acceptable at this time.  Pt reports redness and occasional drainage at her DLES. If there is purulence, recommend obtaining a culture; if positive, please re-consult ID for further recommendations.     Vaccines:  Flu yearly  Hep A series initiated  Prevnar-13 today followed by PPV-23 in 2 months (rx given)  TDAP  shingrix when available      Thanks, Domitila Vasquez PA-C  Pager: [phone]

## 2019-11-21 NOTE — PROGRESS NOTES
11/21/2019  Terra Porter    Current provider:  Renetta Chaudhari MD      I, Terra Porter, rounded on Deborah Navas to ensure all mechanical assist device settings (IABP or VAD) were appropriate and all parameters were within limits.  I was able to ensure all back up equipment was present, the staff had no issues, and the Perfusion Department daily rounding was complete.    8:35 AM

## 2019-11-21 NOTE — PROCEDURES
"Deborah Navas is a 49 y.o. female patient.    Temp: 98.1 °F (36.7 °C) (11/20/19 1600)  Pulse: 90 (11/20/19 1600)  Resp: (!) 21 (11/20/19 1600)  BP: (!) 90/0 (11/20/19 1600)  SpO2: (!) 94 % (11/20/19 1109)  Weight: 100.8 kg (222 lb 3.6 oz) (11/20/19 1600)  Height: 5' 7" (170.2 cm) (11/20/19 1600)    Central Line  Date/Time: 11/20/2019 6:02 PM  Performed by: Abhiijt Ordaz MD  Consent Done: Yes  Site marked: the operative site was marked  Time out: Immediately prior to procedure a "time out" was called to verify the correct patient, procedure, equipment, support staff and site/side marked as required.  Indications: vascular access and hemodynamic monitoring    Anesthesia:  Local Anesthetic: lidocaine 1% without epinephrine  Preparation: skin prepped with ChloraPrep  Location details: right internal jugular  Catheter type: triple lumen  Catheter size: 8 Fr  Number of attempts: 2  Assessment: placement verified by x-ray,  no pneumothorax on x-ray and successful placement  Complications: none  Specimens: No  Implants: No  Post-procedure: line sutured,  chlorhexidine patch,  sterile dressing applied and blood return through all ports          TXP LVAD INTERROGATIONS 11/20/2019 11/20/2019 11/20/2019 11/20/2019 11/20/2019 11/19/2019 11/19/2019   Type HeartMate3 HeartMate3 HeartMate3 HeartMate3 HeartMate3 HeartMate3 HeartMate3   Flow 3.7 2.3 2.8 3.7 3.1 3.1 4.2   Speed 5300 5300 5300 5300 5300 5300 5300   PI 4.7 3.5 5.9 2.7 3.5 3.5 3.3   Power ([place]rell[place]) 3.7 7.1 3.4 3.5 3.6 3.6 3.7   LSL 4900 - - 4900 4900 4900 4900   Pulsatility Intermittent pulse - - - Intermittent pulse Intermittent pulse Intermittent pulse       Abhijit Ordaz  11/20/2019  "

## 2019-11-21 NOTE — CONSULTS
NIAS at bedside for vascular access. Informed by RN that pt will be transferred to ICU and Med Team will place central line. No peripheral access needed at this time.

## 2019-11-21 NOTE — PLAN OF CARE
CMICU DAILY GOALS       A: Awake    RASS: Goal - RASS Goal: 0-->alert and calm  Actual - RASS (Patel Agitation-Sedation Scale): 0-->alert and calm   Restraint necessity:  no  B: Breath   SBT: n/a  C: Coordinate A & B, analgesics/sedatives   Pain: managed   SAT: n/a  D: Delirium   CAM-ICU: Overall CAM-ICU: Negative  E: Early Mobility   MOVE Screen: pass   Activity: Activity Management: activity clustered for rest period, activity adjusted per tolerance  FAS: Feeding/Nutrition   Diet order: Diet/Nutrition Received: 2 gram sodium, low saturated fat/low cholesterol,   Fluid restriction: Fluid Requirement: 1500cc FR  T: Thrombus   DVT prophylaxis: VTE Required Core Measure: Pharmacological prophylaxis initiated/maintained  H: HOB Elevation   Head of Bed (HOB): HOB at 30-45 degrees  U: Ulcer Prophylaxis   GI: yes  G: Glucose control   managed  S: Skin   Bundle compliance: yes  Bathing/Skin Care: back care, bath, chlorhexidine, bath, complete, incontinence care, linen changed, dressed/undressed, shampoo Date: 11/21/2019  B: Bowel Function   11/20  I: Indwelling Catheters   Fonseca necessity:  no   CVC necessity: yes   IPAD offered: no  D: De-escalation Antibx   no  Plan for the day   Monitor vital signs. Maintain safety&comfort.  Family/Goals of care/Code Status   Code Status: Full Code     Pt had a 5 sec run of Vtach, pt asymptomatic. Low flow alarms x2. CVP 11,10,14. Flow 4, Speed 5300, PI 2.4-3.8, Power 3.7. Dressing changed. Drive line site a 3 pink/redness with dried green drainage around site.  Epi gtt.  VS and assessment per flow sheet, patient progressing towards goals as tolerated, plan of care reviewed with Deborah Navas and family, all concerns addressed, will continue to monitor.

## 2019-11-21 NOTE — NURSING
Low flow alarm x2 while pt sitting on side of bed. Pt complained of feeling dizzy. ZAC De La Cruz notified. VSS. TM.

## 2019-11-21 NOTE — PLAN OF CARE
Low flow alarm x2, both while pt sitting in bed. VT x2, asymptomatic. Simon started, then epi gtt started.   VS and assessment per flowsheet, patient progressing towards goals as tolerated, plan of care reviewed with Deborah Navas and family, all concerns addressed, will continue to monitor.    A: Awake    RASS: RASS Goal: 0-->alert and calm  RASS (Patel Agitation-Sedation Scale): 0-->alert and calm  B: Breath   SBT: NA    4L NC with Simon 10 ppm  C: Coordinate A & B, analgesics/sedatives   Pain: managed    SAT: NA  D: Delirium   CAM-ICU: Overall CAM-ICU: Negative  E: Early Mobility   MOVE Screen: Pass   Activity Management: activity clustered for rest period  FAS: Feeding/Nutrition   Diet/Nutrition Received: 2 gram sodium, low saturated fat/low cholesterol,   Fluid restriction: Fluid Requirement: 1500FR  T: Thrombus   VTE Required Core Measure: Pharmacological prophylaxis initiated/maintained  H: HOB Elevation   Head of Bed (HOB): HOB not elevated per patient request  U: Ulcer Prophylaxis   GI: no  G: Glucose control   managed    S: Skin   Bundle compliance: yes   Bathing/Skin Care: back care, bath, chlorhexidine, bath, complete, dressed/undressed Date: 11/20  at 1800  B: Bowel Function   no issues   I: Indwelling Catheters   Parrish necessity: no parrish   CVC necessity: Yes   iPad offered: No  D: De-escalation Antibx   No  Plan for the day   Trend SVO2  Family/Goals of care/Code Status   Code Status: Full Code     Richard Wolf

## 2019-11-21 NOTE — PLAN OF CARE
Notified by nurse of updated hemodynamics:  CVP 10 -> 15 SVO2 54 -> 67 since starting Simon & lasix 15U/hr.  cc so far  Calculated CO 6.41, CI 2.97,   Plan to increased lasix gtt to 20/hr and start 0.02 epi gtt  Will continue to monitor UOP closely and recheck CVP, SVO2 in 4 hours.   Plan of care discussed with Dr. Chaudhari.      Luly Chau MD  Cardiology, PGY IV  Pager: [phone]

## 2019-11-21 NOTE — ASSESSMENT & PLAN NOTE
- Per EP office visit on day of admission: She continues to have multiple VT episodes with some ATP therapy, although no ICD shocks since starting mexiletine 10/24/2019. One slow VT episode 2.5hrs 11/3/2019. Patient asymptomatic with LVAD. On coumadin. No AFL since post-op event (paced out of rhythm 9/24/2019). CHB with 100% V pacing (LV lead off).   - Plan was to continue current regimen despite Mexiletine making her nauseous.  - Decreased Amiodarone to daily per EP plan on discharge last hospitalization   - NSVT continues intermittently on tele, now having palpitations  - device interrogation reviewed and in EPIC

## 2019-11-22 LAB
ALBUMIN SERPL BCP-MCNC: 3.3 G/DL (ref 3.5–5.2)
ALLENS TEST: ABNORMAL
ALP SERPL-CCNC: 92 U/L (ref 55–135)
ALT SERPL W/O P-5'-P-CCNC: 18 U/L (ref 10–44)
ANION GAP SERPL CALC-SCNC: 11 MMOL/L (ref 8–16)
APTT BLDCRRT: 33.8 SEC (ref 21–32)
AST SERPL-CCNC: 21 U/L (ref 10–40)
BASOPHILS # BLD AUTO: 0.05 K/UL (ref 0–0.2)
BASOPHILS NFR BLD: 0.8 % (ref 0–1.9)
BILIRUB DIRECT SERPL-MCNC: 0.3 MG/DL (ref 0.1–0.3)
BILIRUB SERPL-MCNC: 0.5 MG/DL (ref 0.1–1)
BNP SERPL-MCNC: 176 PG/ML (ref 0–99)
BUN SERPL-MCNC: 37 MG/DL (ref 6–20)
CALCIUM SERPL-MCNC: 9.3 MG/DL (ref 8.7–10.5)
CHLORIDE SERPL-SCNC: 101 MMOL/L (ref 95–110)
CMV IGG SERPL QL IA: NORMAL
CO2 SERPL-SCNC: 29 MMOL/L (ref 23–29)
CREAT SERPL-MCNC: 2 MG/DL (ref 0.5–1.4)
CRP SERPL-MCNC: 15.3 MG/L (ref 0–8.2)
DELSYS: ABNORMAL
DIFFERENTIAL METHOD: ABNORMAL
EOSINOPHIL # BLD AUTO: 0.1 K/UL (ref 0–0.5)
EOSINOPHIL NFR BLD: 2 % (ref 0–8)
ERYTHROCYTE [DISTWIDTH] IN BLOOD BY AUTOMATED COUNT: 16.7 % (ref 11.5–14.5)
ERYTHROCYTE [SEDIMENTATION RATE] IN BLOOD BY WESTERGREN METHOD: 18 MM/H
EST. GFR  (AFRICAN AMERICAN): 32.8 ML/MIN/1.73 M^2
EST. GFR  (NON AFRICAN AMERICAN): 28.5 ML/MIN/1.73 M^2
FIO2: 36
FLOW: 4
GLUCOSE SERPL-MCNC: 132 MG/DL (ref 70–110)
HCO3 UR-SCNC: 30.4 MMOL/L (ref 24–28)
HCT VFR BLD AUTO: 36.7 % (ref 37–48.5)
HGB BLD-MCNC: 10.6 G/DL (ref 12–16)
IMM GRANULOCYTES # BLD AUTO: 0.03 K/UL (ref 0–0.04)
IMM GRANULOCYTES NFR BLD AUTO: 0.5 % (ref 0–0.5)
INR PPP: 2.1 (ref 0.8–1.2)
LDH SERPL L TO P-CCNC: 200 U/L (ref 110–260)
LYMPHOCYTES # BLD AUTO: 1.1 K/UL (ref 1–4.8)
LYMPHOCYTES NFR BLD: 17.8 % (ref 18–48)
MAGNESIUM SERPL-MCNC: 2.1 MG/DL (ref 1.6–2.6)
MCH RBC QN AUTO: 24.6 PG (ref 27–31)
MCHC RBC AUTO-ENTMCNC: 28.9 G/DL (ref 32–36)
MCV RBC AUTO: 85 FL (ref 82–98)
METHEMOGLOBIN: 0.3 % (ref 0–3)
MODE: ABNORMAL
MONOCYTES # BLD AUTO: 0.4 K/UL (ref 0.3–1)
MONOCYTES NFR BLD: 6.6 % (ref 4–15)
NEUTROPHILS # BLD AUTO: 4.3 K/UL (ref 1.8–7.7)
NEUTROPHILS NFR BLD: 72.3 % (ref 38–73)
NRBC BLD-RTO: 0 /100 WBC
PCO2 BLDA: 50.9 MMHG (ref 35–45)
PH SMN: 7.38 [PH] (ref 7.35–7.45)
PHOSPHATE SERPL-MCNC: 4.2 MG/DL (ref 2.7–4.5)
PLATELET # BLD AUTO: 292 K/UL (ref 150–350)
PMV BLD AUTO: 9.1 FL (ref 9.2–12.9)
PO2 BLDA: 33 MMHG (ref 40–60)
POC BE: 5 MMOL/L
POC SATURATED O2: 62 % (ref 95–100)
POC TCO2: 32 MMOL/L (ref 24–29)
POTASSIUM SERPL-SCNC: 4 MMOL/L (ref 3.5–5.1)
PREALB SERPL-MCNC: 30 MG/DL (ref 20–43)
PROT SERPL-MCNC: 6.9 G/DL (ref 6–8.4)
PROTHROMBIN TIME: 20.4 SEC (ref 9–12.5)
RBC # BLD AUTO: 4.31 M/UL (ref 4–5.4)
SAMPLE: ABNORMAL
SITE: ABNORMAL
SODIUM SERPL-SCNC: 141 MMOL/L (ref 136–145)
SP02: 99
WBC # BLD AUTO: 5.9 K/UL (ref 3.9–12.7)

## 2019-11-22 PROCEDURE — 63600175 PHARM REV CODE 636 W HCPCS: Mod: NTX | Performed by: PHYSICIAN ASSISTANT

## 2019-11-22 PROCEDURE — 85730 THROMBOPLASTIN TIME PARTIAL: CPT | Mod: NTX

## 2019-11-22 PROCEDURE — 80076 HEPATIC FUNCTION PANEL: CPT | Mod: NTX

## 2019-11-22 PROCEDURE — 94761 N-INVAS EAR/PLS OXIMETRY MLT: CPT | Mod: NTX

## 2019-11-22 PROCEDURE — 63600175 PHARM REV CODE 636 W HCPCS: Mod: NTX | Performed by: STUDENT IN AN ORGANIZED HEALTH CARE EDUCATION/TRAINING PROGRAM

## 2019-11-22 PROCEDURE — 20000000 HC ICU ROOM: Mod: NTX

## 2019-11-22 PROCEDURE — 25000003 PHARM REV CODE 250: Mod: NTX | Performed by: INTERNAL MEDICINE

## 2019-11-22 PROCEDURE — 83615 LACTATE (LD) (LDH) ENZYME: CPT | Mod: NTX

## 2019-11-22 PROCEDURE — 83880 ASSAY OF NATRIURETIC PEPTIDE: CPT | Mod: NTX

## 2019-11-22 PROCEDURE — 82803 BLOOD GASES ANY COMBINATION: CPT | Mod: NTX

## 2019-11-22 PROCEDURE — 27000221 HC OXYGEN, UP TO 24 HOURS: Mod: NTX

## 2019-11-22 PROCEDURE — 93750 PR INTERROGATE VENT ASSIST DEV, IN PERSON, W PHYSICIAN ANALYSIS: ICD-10-PCS | Mod: NTX,,, | Performed by: INTERNAL MEDICINE

## 2019-11-22 PROCEDURE — 83735 ASSAY OF MAGNESIUM: CPT | Mod: NTX

## 2019-11-22 PROCEDURE — 84134 ASSAY OF PREALBUMIN: CPT | Mod: NTX

## 2019-11-22 PROCEDURE — 99292 PR CRITICAL CARE, ADDL 30 MIN: ICD-10-PCS | Mod: 25,NTX,, | Performed by: INTERNAL MEDICINE

## 2019-11-22 PROCEDURE — 84100 ASSAY OF PHOSPHORUS: CPT | Mod: NTX

## 2019-11-22 PROCEDURE — 83050 HGB METHEMOGLOBIN QUAN: CPT | Mod: NTX

## 2019-11-22 PROCEDURE — 80048 BASIC METABOLIC PNL TOTAL CA: CPT | Mod: NTX

## 2019-11-22 PROCEDURE — 99291 PR CRITICAL CARE, E/M 30-74 MINUTES: ICD-10-PCS | Mod: NTX,,, | Performed by: PHYSICIAN ASSISTANT

## 2019-11-22 PROCEDURE — 85025 COMPLETE CBC W/AUTO DIFF WBC: CPT | Mod: NTX

## 2019-11-22 PROCEDURE — 86140 C-REACTIVE PROTEIN: CPT | Mod: NTX

## 2019-11-22 PROCEDURE — 99291 CRITICAL CARE FIRST HOUR: CPT | Mod: NTX,,, | Performed by: PHYSICIAN ASSISTANT

## 2019-11-22 PROCEDURE — 63600367 HC NITRIC OXIDE PER HOUR: Mod: NTX

## 2019-11-22 PROCEDURE — 99292 CRITICAL CARE ADDL 30 MIN: CPT | Mod: 25,NTX,, | Performed by: INTERNAL MEDICINE

## 2019-11-22 PROCEDURE — 85610 PROTHROMBIN TIME: CPT | Mod: NTX

## 2019-11-22 PROCEDURE — 25000003 PHARM REV CODE 250: Mod: NTX | Performed by: PHYSICIAN ASSISTANT

## 2019-11-22 PROCEDURE — 99900035 HC TECH TIME PER 15 MIN (STAT): Mod: NTX

## 2019-11-22 PROCEDURE — 27000248 HC VAD-ADDITIONAL DAY: Mod: NTX

## 2019-11-22 PROCEDURE — 93750 INTERROGATION VAD IN PERSON: CPT | Mod: NTX,,, | Performed by: INTERNAL MEDICINE

## 2019-11-22 RX ADMIN — PROMETHAZINE HYDROCHLORIDE 12.5 MG: 12.5 TABLET ORAL at 02:11

## 2019-11-22 RX ADMIN — WARFARIN SODIUM 2 MG: 2 TABLET ORAL at 04:11

## 2019-11-22 RX ADMIN — FUROSEMIDE 80 MG: 10 INJECTION, SOLUTION INTRAMUSCULAR; INTRAVENOUS at 05:11

## 2019-11-22 RX ADMIN — MIRTAZAPINE 15 MG: 15 TABLET, FILM COATED ORAL at 09:11

## 2019-11-22 RX ADMIN — VENLAFAXINE 150 MG: 37.5 TABLET ORAL at 08:11

## 2019-11-22 RX ADMIN — ALPRAZOLAM 0.25 MG: 0.25 TABLET ORAL at 12:11

## 2019-11-22 RX ADMIN — ZOLPIDEM TARTRATE 5 MG: 5 TABLET ORAL at 11:11

## 2019-11-22 RX ADMIN — GABAPENTIN 200 MG: 100 CAPSULE ORAL at 08:11

## 2019-11-22 RX ADMIN — MAGNESIUM OXIDE TAB 400 MG (241.3 MG ELEMENTAL MG) 400 MG: 400 (241.3 MG) TAB at 08:11

## 2019-11-22 RX ADMIN — AMIODARONE HYDROCHLORIDE 400 MG: 200 TABLET ORAL at 08:11

## 2019-11-22 RX ADMIN — MEXILETINE HYDROCHLORIDE 150 MG: 150 CAPSULE ORAL at 02:11

## 2019-11-22 RX ADMIN — LISINOPRIL 10 MG: 10 TABLET ORAL at 09:11

## 2019-11-22 RX ADMIN — SENNOSIDES AND DOCUSATE SODIUM 2 TABLET: 8.6; 5 TABLET ORAL at 08:11

## 2019-11-22 RX ADMIN — MEXILETINE HYDROCHLORIDE 150 MG: 150 CAPSULE ORAL at 09:11

## 2019-11-22 RX ADMIN — MEXILETINE HYDROCHLORIDE 150 MG: 150 CAPSULE ORAL at 06:11

## 2019-11-22 RX ADMIN — FUROSEMIDE 80 MG: 10 INJECTION, SOLUTION INTRAMUSCULAR; INTRAVENOUS at 08:11

## 2019-11-22 RX ADMIN — SENNOSIDES AND DOCUSATE SODIUM 2 TABLET: 8.6; 5 TABLET ORAL at 04:11

## 2019-11-22 RX ADMIN — PANTOPRAZOLE SODIUM 40 MG: 40 TABLET, DELAYED RELEASE ORAL at 08:11

## 2019-11-22 RX ADMIN — ASPIRIN 325 MG: 325 TABLET, DELAYED RELEASE ORAL at 08:11

## 2019-11-22 RX ADMIN — EPINEPHRINE 0.02 MCG/KG/MIN: 1 INJECTION PARENTERAL at 06:11

## 2019-11-22 RX ADMIN — SPIRONOLACTONE 12.5 MG: 25 TABLET, FILM COATED ORAL at 09:11

## 2019-11-22 RX ADMIN — GABAPENTIN 200 MG: 100 CAPSULE ORAL at 09:11

## 2019-11-22 RX ADMIN — LISINOPRIL 10 MG: 10 TABLET ORAL at 08:11

## 2019-11-22 NOTE — SUBJECTIVE & OBJECTIVE
Interval History: one episode of VT overnight no alarms on epi and nitric     Continuous Infusions:   sodium chloride 0.9%      epinephrine 0.02 mcg/kg/min (11/22/19 0701)    nitric oxide gas       Scheduled Meds:   amiodarone  400 mg Oral Daily    aspirin  325 mg Oral Daily    furosemide  80 mg Intravenous BID    gabapentin  200 mg Oral BID    hepatitis A virus vaccine (PF)  1,440 Units Intramuscular Once    lisinopril  10 mg Oral BID    magnesium oxide  400 mg Oral Daily    mexiletine  150 mg Oral Q8H    mirtazapine  15 mg Oral QHS    pantoprazole  40 mg Oral Daily    spironolactone  12.5 mg Oral Daily    venlafaxine  150 mg Oral Daily    warfarin  2 mg Oral Daily     PRN Meds:sodium chloride 0.9%, ALPRAZolam, pneumoc 13-amber conj-dip cr(PF), promethazine, senna-docusate 8.6-50 mg, zolpidem    Review of patient's allergies indicates:   Allergen Reactions    Adhesive Blisters     Reaction to area in chest and up only    Codeine Itching     Objective:     Vital Signs (Most Recent):  Temp: 98.7 °F (37.1 °C) (11/22/19 0300)  Pulse: 89 (11/22/19 0701)  Resp: 10 (11/22/19 0701)  BP: (!) 80/0 (11/22/19 0400)  SpO2: 99 % (11/22/19 0338) Vital Signs (24h Range):  Temp:  [97.7 °F (36.5 °C)-98.7 °F (37.1 °C)] 98.7 °F (37.1 °C)  Pulse:  [] 89  Resp:  [10-66] 10  SpO2:  [99 %] 99 %  BP: (78-82)/(0) 80/0     Patient Vitals for the past 72 hrs (Last 3 readings):   Weight   11/22/19 0500 100.8 kg (222 lb 3.6 oz)   11/21/19 0701 99.9 kg (220 lb 3.8 oz)   11/21/19 0500 99.9 kg (220 lb 3.8 oz)     Body mass index is 34.81 kg/m².      Intake/Output Summary (Last 24 hours) at 11/22/2019 0736  Last data filed at 11/22/2019 0701  Gross per 24 hour   Intake 1145.15 ml   Output 2100 ml   Net -954.85 ml       Hemodynamic Parameters:  CVP:  [8 mmHg] 8 mmHg      Physical Exam   Constitutional: She is oriented to person, place, and time. She appears well-developed and well-nourished.   HENT:   Mouth/Throat: Oropharynx  is clear and moist.   Eyes: EOM are normal.   Neck: No JVD present.   Cardiovascular: Normal rate, regular rhythm and intact distal pulses.   Smooth VAD hum   Pulmonary/Chest: Effort normal and breath sounds normal. No respiratory distress. She has no rales.   Abdominal: Soft. Bowel sounds are normal. She exhibits no distension. There is no tenderness. There is no guarding.   Musculoskeletal: She exhibits no edema.   Neurological: She is alert and oriented to person, place, and time.   Skin: Skin is warm.   Psychiatric: She has a normal mood and affect.   Nursing note and vitals reviewed.      Significant Labs:  CBC:  Recent Labs   Lab 11/20/19  0605 11/21/19  0515 11/22/19  0349   WBC 4.54 6.92 5.90   RBC 4.98 4.86 4.31   HGB 12.6 12.0 10.6*   HCT 42.3 41.5 36.7*   * 354* 292   MCV 85 85 85   MCH 25.3* 24.7* 24.6*   MCHC 29.8* 28.9* 28.9*     BNP:  Recent Labs   Lab 11/18/19  0412 11/20/19  0606 11/22/19  0349   * 241* 176*     CMP:  Recent Labs   Lab 11/18/19  0412  11/20/19  0606  11/21/19  1233 11/21/19  2116 11/22/19  0349   GLU  --    < > 141*   < > 182* 126* 132*   CALCIUM  --    < > 9.8   < > 9.3 9.3 9.3   ALBUMIN 3.6  --  3.8  --   --   --  3.3*   PROT 7.3  --  7.9  --   --   --  6.9   NA  --    < > 142   < > 139 142 141   K  --    < > 3.5   < > 3.9 3.8 4.0   CO2  --    < > 25   < > 27 30* 29   CL  --    < > 102   < > 101 101 101   BUN  --    < > 31*   < > 38* 35* 37*   CREATININE  --    < > 1.7*   < > 2.2* 2.1* 2.0*   ALKPHOS 93  --  97  --   --   --  92   ALT 13  --  15  --   --   --  18   AST 17  --  22  --   --   --  21   BILITOT 0.4  --  0.5  --   --   --  0.5    < > = values in this interval not displayed.      Coagulation:   Recent Labs   Lab 11/20/19 0606 11/21/19 0515 11/22/19 0349   INR 2.2* 2.1* 2.1*   APTT 30.1 29.6 33.8*     LDH:  Recent Labs   Lab 11/20/19  0605 11/21/19 0515 11/22/19 0349    238 200     Microbiology:  Microbiology Results (last 7 days)     ** No  results found for the last 168 hours. **          I have reviewed all pertinent labs within the past 24 hours.    Estimated Creatinine Clearance: 41.1 mL/min (A) (based on SCr of 2 mg/dL (H)).    Diagnostic Results:  I have reviewed and interpreted all pertinent imaging results/findings within the past 24 hours.

## 2019-11-22 NOTE — PHYSICIAN QUERY
PT Name: Deborah Navas  MR #: [id number]  Physician Query Form - CKD Clarification     CDS/: Sadia Leslie RN, CCDS              Contact information: [email or web address]  This form is a permanent document in the medical record.     Query Date: November 22, 2019    By submitting this query, we are merely seeking further clarification of documentation. Please utilize your independent clinical judgment when addressing the question(s) below.    The Medical record contains the following:     Indicators   Supporting Clinical Findings   Location in Medical Record   X CKD or Chronic Kidney (Renal) Failure / Disease CKD     Baseline creat 1.1-1.4 (was 1.5-1.8 during last admission) 10/20 h/p,  10/21-10/22  prog notes   X BUN/Creatinine                          GFR Cr-1.7-->2.2-->2.0  gfr- 34.7-->25.4-->28.5 10/20- 10/22 labs    Dehydration      Nausea / Vomiting      Dialysis / CRRT      Medication      Treatment     X Other Chronic Conditions Chronic systolic HF, s/p LVAD  NICM, HTN,  10/20 h/p    Other       Provider, please further specify the stage of CKD.    [   ] Chronic Kidney Disease (CKD) (please specify stage* below)      [employer] Definitions     Stage Description eGFR (mL/min)   [   ]   II Mildly reduced kidney function 60-89   [  X ]    III Moderately reduced kidney function 30-59   [   ]    IV Severely reduced kidney function 15-29   [   ] Other (please specify): ____________    [   ]  Clinically Undetermined          Please document in your progress notes daily for the duration of treatment until resolved and include in your discharge summary.

## 2019-11-22 NOTE — PLAN OF CARE
CMICU DAILY GOALS       A: Awake    RASS: Goal - RASS Goal: 0-->alert and calm  Actual - RASS (Patel Agitation-Sedation Scale): 0-->alert and calm   Restraint necessity:    B: Breath   SBT: NA   C: Coordinate A & B, analgesics/sedatives   Pain: managed    SAT: NA  D: Delirium   CAM-ICU: Overall CAM-ICU: Negative  E: Early Mobility   MOVE Screen: Pass   Activity: Activity Management: up to bedside commode - L3  FAS: Feeding/Nutrition   Diet order: Diet/Nutrition Received: 2 gram sodium,   Fluid restriction: Fluid Requirement: 1500cc FR  T: Thrombus   DVT prophylaxis: VTE Required Core Measure: Pharmacological prophylaxis initiated/maintained  H: HOB Elevation   Head of Bed (HOB): HOB at 20 degrees  U: Ulcer Prophylaxis   GI: yes  G: Glucose control   managed    S: Skin   Bundle compliance: yes   Bathing/Skin Care: back care, bath, chlorhexidine, bath, complete, incontinence care, linen changed, dressed/undressed, shampoo Date: 11/21/2019 [unfilled]  B: Bowel Function   no issues   I: Indwelling Catheters   Fonseca necessity:     CVC necessity: Yes   IPAD offered: Yes  D: De-escalation Antibx   No  Plan for the day   Continue heart transplant w/u              Wean Nitric if possible    Family/Goals of care/Code Status   Code Status: Full Code     No acute events throughout the night, pt had no VAD alarms. 1 run of MD dayne notified. Lab values within range, SvO2 62, CVP <10 through the night. VS and assessment per flow sheet, patient progressing towards goals as tolerated, plan of care reviewed with Deborah Navas and family, all concerns addressed, will continue to monitor.

## 2019-11-22 NOTE — NURSING
Fay FRANK notified of patient's run of Vtach x2. Patient symptomatic during these events; reporting feeling dizzy and light headed. Doppler obtained, and pressure had not dropped from previous measurements. Patient's LVAD flows dropped, but did not alarm. Will continue to monitor. No new orders at this time.

## 2019-11-22 NOTE — ASSESSMENT & PLAN NOTE
-HeartMate 3 Implanted 9/10/19 as DT.  -Implanted by Dr. Walden  -INR therapeutic. Goal INR 2.0-3.0. Continue Coumadin.   - Previous home dose was 3mg on Wed and 1.5mg QDaily  -Antiplatelets:  mg.  -LDH is stable Will monitor daily.   -Speed set at 5300, had another asymptomatic LFA on 11/20  -Not listed for OHTx: was declined due to pulmonary hypertension and incomplete care giving plan. Patient now has care giving plan. 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    Procedure: Device Interrogation Including analysis of device parameters  Current Settings: Ventricular Assist Device  Review of device function is stable    TXP LVAD INTERROGATIONS 11/22/2019 11/22/2019 11/22/2019 11/22/2019 11/22/2019 11/22/2019 11/22/2019   Type HeartMate3 HeartMate3 HeartMate3 HeartMate3 HeartMate3 HeartMate3 HeartMate3   Flow 3.9 4.2 4.2 4.3 4.2 41 3.7   Speed 5250 5300 5300 5300 5250 5300 5300   PI 3.6 3.5 3.6 3.5 3.9 4.0 4.8   Power (Orellana) 3.6 3.8 3.8 3.5 3.6 3.6 3.8   LSL - - - - - - -   Pulsatility - - - - - - -

## 2019-11-22 NOTE — NURSING
"Pt had run of v-tach. Pt stated "That was real, I felt fluttering in my chest, but my AICD didn't shock me. " Dr Chau was contacted and informed about pt's run of v-tach. Additional lab orders were obtained. Pt denies chest pain at this time. Will continue to monitor pt for changes in status.   "

## 2019-11-22 NOTE — PLAN OF CARE
CMICU DAILY GOALS       A: Awake    RASS: Goal - RASS Goal: 0-->alert and calm  Actual - RASS (Patel Agitation-Sedation Scale): 0-->alert and calm   Restraint necessity:    B: Breath   SBT: Not intubated   C: Coordinate A & B, analgesics/sedatives   Pain: managed    SAT: Not intubated  D: Delirium   CAM-ICU: Overall CAM-ICU: Negative  E: Early Mobility   MOVE Screen: Pass   Activity: Activity Management: activity encouraged, activity clustered for rest period, activity adjusted per tolerance  FAS: Feeding/Nutrition   Diet order: Diet/Nutrition Received: 2 gram sodium,   Fluid restriction: Fluid Requirement: 1500 cc FR  T: Thrombus   DVT prophylaxis: VTE Required Core Measure: Pharmacological prophylaxis initiated/maintained  H: HOB Elevation   Head of Bed (HOB): HOB at 30-45 degrees  U: Ulcer Prophylaxis   GI: no  G: Glucose control   managed    S: Skin   Bundle compliance: yes   Bathing/Skin Care: back care, bath, chlorhexidine, bath, complete, incontinence care, linen changed, dressed/undressed, shampoo Date: 11/22 am shift   B: Bowel Function   constipation   I: Indwelling Catheters   Fonseac necessity:     CVC necessity: Yes   IPAD offered: Not appropriate  D: De-escalation Antibx   Yes  Plan for the day   Monitor fluid status; monitor VAD alarms   Family/Goals of care/Code Status   Code Status: Full Code     No acute events throughout day, VS and assessment per flow sheet, patient progressing towards goals as tolerated, plan of care reviewed with Deborah Navas and family, all concerns addressed, will continue to monitor.

## 2019-11-22 NOTE — PROGRESS NOTES
11/22/2019  Terra Porter    Current provider:  Renetta Chaudhari MD      I, Terra Porter, rounded on Deborah Navas to ensure all mechanical assist device settings (IABP or VAD) were appropriate and all parameters were within limits.  I was able to ensure all back up equipment was present, the staff had no issues, and the Perfusion Department daily rounding was complete.    8:37 AM

## 2019-11-22 NOTE — PROGRESS NOTES
[facility]  Heart Transplant  Progress Note    Patient Name: Deborah Navas  MRN: [id number]  Admission Date: 11/13/2019  Hospital Length of Stay: 7 days  Attending Physician: Renetta Chaudhari MD  Primary Care Provider: Primary Doctor No  Principal Problem:Left ventricular assist device (LVAD) complication    Subjective:     Interval History: one episode of VT overnight no alarms on epi and nitric     Continuous Infusions:   sodium chloride 0.9%      epinephrine 0.02 mcg/kg/min (11/22/19 0701)    nitric oxide gas       Scheduled Meds:   amiodarone  400 mg Oral Daily    aspirin  325 mg Oral Daily    furosemide  80 mg Intravenous BID    gabapentin  200 mg Oral BID    hepatitis A virus vaccine (PF)  1,440 Units Intramuscular Once    lisinopril  10 mg Oral BID    magnesium oxide  400 mg Oral Daily    mexiletine  150 mg Oral Q8H    mirtazapine  15 mg Oral QHS    pantoprazole  40 mg Oral Daily    spironolactone  12.5 mg Oral Daily    venlafaxine  150 mg Oral Daily    warfarin  2 mg Oral Daily     PRN Meds:sodium chloride 0.9%, ALPRAZolam, pneumoc 13-amber conj-dip cr(PF), promethazine, senna-docusate 8.6-50 mg, zolpidem    Review of patient's allergies indicates:   Allergen Reactions    Adhesive Blisters     Reaction to area in chest and up only    Codeine Itching     Objective:     Vital Signs (Most Recent):  Temp: 98.7 °F (37.1 °C) (11/22/19 0300)  Pulse: 89 (11/22/19 0701)  Resp: 10 (11/22/19 0701)  BP: (!) 80/0 (11/22/19 0400)  SpO2: 99 % (11/22/19 0338) Vital Signs (24h Range):  Temp:  [97.7 °F (36.5 °C)-98.7 °F (37.1 °C)] 98.7 °F (37.1 °C)  Pulse:  [] 89  Resp:  [10-66] 10  SpO2:  [99 %] 99 %  BP: (78-82)/(0) 80/0     Patient Vitals for the past 72 hrs (Last 3 readings):   Weight   11/22/19 0500 100.8 kg (222 lb 3.6 oz)   11/21/19 0701 99.9 kg (220 lb 3.8 oz)   11/21/19 0500 99.9 kg (220 lb 3.8 oz)     Body mass index is 34.81 kg/m².      Intake/Output Summary (Last 24  hours) at 11/22/2019 0736  Last data filed at 11/22/2019 0701  Gross per 24 hour   Intake 1145.15 ml   Output 2100 ml   Net -954.85 ml       Hemodynamic Parameters:  CVP:  [8 mmHg] 8 mmHg      Physical Exam   Constitutional: She is oriented to person, place, and time. She appears well-developed and well-nourished.   HENT:   Mouth/Throat: Oropharynx is clear and moist.   Eyes: EOM are normal.   Neck: No JVD present.   Cardiovascular: Normal rate, regular rhythm and intact distal pulses.   Smooth VAD hum   Pulmonary/Chest: Effort normal and breath sounds normal. No respiratory distress. She has no rales.   Abdominal: Soft. Bowel sounds are normal. She exhibits no distension. There is no tenderness. There is no guarding.   Musculoskeletal: She exhibits no edema.   Neurological: She is alert and oriented to person, place, and time.   Skin: Skin is warm.   Psychiatric: She has a normal mood and affect.   Nursing note and vitals reviewed.      Significant Labs:  CBC:  Recent Labs   Lab 11/20/19  0605 11/21/19  0515 11/22/19  0349   WBC 4.54 6.92 5.90   RBC 4.98 4.86 4.31   HGB 12.6 12.0 10.6*   HCT 42.3 41.5 36.7*   * 354* 292   MCV 85 85 85   MCH 25.3* 24.7* 24.6*   MCHC 29.8* 28.9* 28.9*     BNP:  Recent Labs   Lab 11/18/19  0412 11/20/19  0606 11/22/19  0349   * 241* 176*     CMP:  Recent Labs   Lab 11/18/19  0412  11/20/19  0606  11/21/19  1233 11/21/19  2116 11/22/19  0349   GLU  --    < > 141*   < > 182* 126* 132*   CALCIUM  --    < > 9.8   < > 9.3 9.3 9.3   ALBUMIN 3.6  --  3.8  --   --   --  3.3*   PROT 7.3  --  7.9  --   --   --  6.9   NA  --    < > 142   < > 139 142 141   K  --    < > 3.5   < > 3.9 3.8 4.0   CO2  --    < > 25   < > 27 30* 29   CL  --    < > 102   < > 101 101 101   BUN  --    < > 31*   < > 38* 35* 37*   CREATININE  --    < > 1.7*   < > 2.2* 2.1* 2.0*   ALKPHOS 93  --  97  --   --   --  92   ALT 13  --  15  --   --   --  18   AST 17  --  22  --   --   --  21   BILITOT 0.4  --  0.5   --   --   --  0.5    < > = values in this interval not displayed.      Coagulation:   Recent Labs   Lab 11/20/19  0606 11/21/19  0515 11/22/19  0349   INR 2.2* 2.1* 2.1*   APTT 30.1 29.6 33.8*     LDH:  Recent Labs   Lab 11/20/19  0605 11/21/19  0515 11/22/19  0349    238 200     Microbiology:  Microbiology Results (last 7 days)     ** No results found for the last 168 hours. **          I have reviewed all pertinent labs within the past 24 hours.    Estimated Creatinine Clearance: 41.1 mL/min (A) (based on SCr of 2 mg/dL (H)).    Diagnostic Results:  I have reviewed and interpreted all pertinent imaging results/findings within the past 24 hours.    Assessment and Plan:     50 yo WF with NICM, s/p HM3 implantation 9/10/19 (post up coarse uncomplicated with the exception of+ diaphragmatic stimulation of LV lead and pleural effusion with chest tube placement, atrial flutter with NADINE/DCCV), V-fib reported in setting of hypokalemia with appropriate shock from ICD on Amiodarone prior to LVAD placement; and recent hospitalizations for ventricular arrythmias; started on Mexilitine.  She was seen in EP clinic today and she continues to have multiple VT episodes with some ATP therapy, although no ICD shocks since starting mexiletine 10/24/2019. One slow VT episode 2.5hrs 11/3/2019. Patient asymptomatic with LVAD. On coumadin. No AFL since post-op event (paced out of rhythm 9/24/2019). CHB with 100% V pacing (LV lead off). She does not feel well. Dry heaving with mexiletine. Taking phenergan PRN. 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   She was seen in HTS clinic and reported 4 low flow alarms the last 4 nights.  She reports they occur in her sleep and last for only a few seconds.  By the time she wakes up; they quickly stop.  She does report to possibly being little volume overloaded.  She hasn't noticed weight gain at home but thinks she may have little extra fluid.  She denies  SOB, chest pain, palpitations, LEGER. In review of her records: she was 223lbs on 10/24/19 during previous hospitalization and is 235lbs now.  Her lasix had previously been decreased to prn.     * Left ventricular assist device (LVAD) complication  -hx low flow alarms prior to admit, last LFA 11/20  -Possibly secondary to ADHF.  -Echo shows IVS shifting to right side and LVED increased to 6.4.    -Formal report of CT unremarkable; however, it was a non contrast as creat was elevated above baseline on admit and inflow and outflow cannulas not accurately visualized     Essential hypertension  -Patient is non-pulsatile  -Doppler goal 60-90 mmHg  -Antihypertensive medications include  Lisinopril  -Hydralazine was started on evening of admit and then changed to low dose Norvasc and Spironolactone to aid with BP and low K+.  Hesitated on increasing Lisinopril because of active diuresis and concern for fluctuation of creatinine    - amlodipine now on hold in setting of symptomatic hypotension and LFAs on 11/16  - If BP remains stable would like to increase ACE/add isosorbide/hydral for afterload reduction is setting of elevated wedge, will continue with diuresis for now.    Ventricular tachycardia  - Per EP office visit on day of admission: She continues to have multiple VT episodes with some ATP therapy, although no ICD shocks since starting mexiletine 10/24/2019. One slow VT episode 2.5hrs 11/3/2019. Patient asymptomatic with LVAD. On coumadin. No AFL since post-op event (paced out of rhythm 9/24/2019). CHB with 100% V pacing (LV lead off).   - Plan was to continue current regimen despite Mexiletine making her nauseous.  - Decreased Amiodarone to daily per EP plan on discharge last hospitalization   - NSVT continues intermittently on tele, now having palpitations  - device interrogation reviewed and in EPIC    LVAD (left ventricular assist device) present  -HeartMate 3 Implanted 9/10/19 as DT.  -Implanted by   [employer]ram  -INR therapeutic. Goal INR 2.0-3.0. Continue Coumadin.   - Previous home dose was 3mg on Wed and 1.5mg QDaily  -Antiplatelets:  mg.  -LDH is stable Will monitor daily.   -Speed set at 5300, had another asymptomatic LFA on 11/20  -Not listed for OHTx: was declined due to pulmonary hypertension and incomplete care giving plan. Patient now has care giving plan. 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    Procedure: Device Interrogation Including analysis of device parameters  Current Settings: Ventricular Assist Device  Review of device function is stable    TXP LVAD INTERROGATIONS 11/22/2019 11/22/2019 11/22/2019 11/22/2019 11/22/2019 11/22/2019 11/22/2019   Type HeartMate3 HeartMate3 HeartMate3 HeartMate3 HeartMate3 HeartMate3 HeartMate3   Flow 3.9 4.2 4.2 4.3 4.2 41 3.7   Speed 5250 5300 5300 5300 5250 5300 5300   PI 3.6 3.5 3.6 3.5 3.9 4.0 4.8   Power (Orellana) 3.6 3.8 3.8 3.5 3.6 3.6 3.8   LSL - - - - - - -   Pulsatility - - - - - - -       CKD (chronic kidney disease)  - Baseline creat appears 1.3-1.6  - Will monitor with diuresis     Acute on chronic combined systolic and diastolic heart failure  -NICM  -Last 2D Echo 11/13/19: LVEF 20%, LVEDD 6.3 cm with speed at 5300 and severe MR, no adjustments today per Dr Chaudhari  -lasix continues at 15 mg/hr restarted after RHC 11/18 (RA 20, W 20, CO/CI 3.79/1.77, SVR 1500)   - now on EPI .02 and NO @ 10 with svo2 improving from 50-> 70's   - decreased lasix ggt to 10 from 20 due to continued rise of Cr  - GDMT: Continue ACE 10 BID, aldactone 12.5, consider isordil+hydral   - was on lasix 20 PRN at home PTA  -2g Na dietary restriction, 1500 mL fluid restriction, strict I/Os      ICD (implantable cardioverter-defibrillator) in place  - [employer] ICD  - last interrogation on 11/20/19 with ATP x9 for VT, no shocks  - See above VT    Uninterrupted Critical Care/Counseling Time (not including procedures): 30 minutes      [facility] [place]  Heart  Transplant  [facility]

## 2019-11-23 LAB
ALLENS TEST: ABNORMAL
ANION GAP SERPL CALC-SCNC: 10 MMOL/L (ref 8–16)
APTT BLDCRRT: 33.9 SEC (ref 21–32)
BASOPHILS # BLD AUTO: 0.04 K/UL (ref 0–0.2)
BASOPHILS NFR BLD: 0.8 % (ref 0–1.9)
BUN SERPL-MCNC: 36 MG/DL (ref 6–20)
CALCIUM SERPL-MCNC: 9.3 MG/DL (ref 8.7–10.5)
CHLORIDE SERPL-SCNC: 102 MMOL/L (ref 95–110)
CO2 SERPL-SCNC: 29 MMOL/L (ref 23–29)
CREAT SERPL-MCNC: 1.9 MG/DL (ref 0.5–1.4)
DELSYS: ABNORMAL
DIFFERENTIAL METHOD: ABNORMAL
EOSINOPHIL # BLD AUTO: 0.2 K/UL (ref 0–0.5)
EOSINOPHIL NFR BLD: 3 % (ref 0–8)
ERYTHROCYTE [DISTWIDTH] IN BLOOD BY AUTOMATED COUNT: 16.8 % (ref 11.5–14.5)
ERYTHROCYTE [SEDIMENTATION RATE] IN BLOOD BY WESTERGREN METHOD: 18 MM/H
EST. GFR  (AFRICAN AMERICAN): 34.9 ML/MIN/1.73 M^2
EST. GFR  (NON AFRICAN AMERICAN): 30.3 ML/MIN/1.73 M^2
FIO2: 36
FLOW: 4
GLUCOSE SERPL-MCNC: 143 MG/DL (ref 70–110)
HCO3 UR-SCNC: 29.8 MMOL/L (ref 24–28)
HCT VFR BLD AUTO: 36.1 % (ref 37–48.5)
HGB BLD-MCNC: 10.6 G/DL (ref 12–16)
IMM GRANULOCYTES # BLD AUTO: 0.02 K/UL (ref 0–0.04)
IMM GRANULOCYTES NFR BLD AUTO: 0.4 % (ref 0–0.5)
INR PPP: 2.1 (ref 0.8–1.2)
LDH SERPL L TO P-CCNC: 216 U/L (ref 110–260)
LYMPHOCYTES # BLD AUTO: 0.9 K/UL (ref 1–4.8)
LYMPHOCYTES NFR BLD: 17.6 % (ref 18–48)
MAGNESIUM SERPL-MCNC: 2.2 MG/DL (ref 1.6–2.6)
MCH RBC QN AUTO: 25.1 PG (ref 27–31)
MCHC RBC AUTO-ENTMCNC: 29.4 G/DL (ref 32–36)
MCV RBC AUTO: 85 FL (ref 82–98)
METHEMOGLOBIN: 0.4 % (ref 0–3)
MODE: ABNORMAL
MONOCYTES # BLD AUTO: 0.4 K/UL (ref 0.3–1)
MONOCYTES NFR BLD: 7.4 % (ref 4–15)
NEUTROPHILS # BLD AUTO: 3.6 K/UL (ref 1.8–7.7)
NEUTROPHILS NFR BLD: 70.8 % (ref 38–73)
NRBC BLD-RTO: 0 /100 WBC
PCO2 BLDA: 51.1 MMHG (ref 35–45)
PH SMN: 7.37 [PH] (ref 7.35–7.45)
PHOSPHATE SERPL-MCNC: 3.6 MG/DL (ref 2.7–4.5)
PLATELET # BLD AUTO: 300 K/UL (ref 150–350)
PMV BLD AUTO: 9.7 FL (ref 9.2–12.9)
PO2 BLDA: 37 MMHG (ref 40–60)
POC BE: 5 MMOL/L
POC SATURATED O2: 68 % (ref 95–100)
POC TCO2: 31 MMOL/L (ref 24–29)
POTASSIUM SERPL-SCNC: 3.8 MMOL/L (ref 3.5–5.1)
PROTHROMBIN TIME: 20.5 SEC (ref 9–12.5)
RBC # BLD AUTO: 4.23 M/UL (ref 4–5.4)
SAMPLE: ABNORMAL
SITE: ABNORMAL
SODIUM SERPL-SCNC: 141 MMOL/L (ref 136–145)
SP02: 99
WBC # BLD AUTO: 5.01 K/UL (ref 3.9–12.7)

## 2019-11-23 PROCEDURE — 25000003 PHARM REV CODE 250: Mod: NTX | Performed by: INTERNAL MEDICINE

## 2019-11-23 PROCEDURE — 85025 COMPLETE CBC W/AUTO DIFF WBC: CPT | Mod: NTX

## 2019-11-23 PROCEDURE — 27000248 HC VAD-ADDITIONAL DAY: Mod: NTX

## 2019-11-23 PROCEDURE — 82803 BLOOD GASES ANY COMBINATION: CPT | Mod: NTX

## 2019-11-23 PROCEDURE — 83615 LACTATE (LD) (LDH) ENZYME: CPT | Mod: NTX

## 2019-11-23 PROCEDURE — 83050 HGB METHEMOGLOBIN QUAN: CPT | Mod: NTX

## 2019-11-23 PROCEDURE — 99291 CRITICAL CARE FIRST HOUR: CPT | Mod: 25,NTX,, | Performed by: INTERNAL MEDICINE

## 2019-11-23 PROCEDURE — 20000000 HC ICU ROOM: Mod: NTX

## 2019-11-23 PROCEDURE — 93750 INTERROGATION VAD IN PERSON: CPT | Mod: NTX,,, | Performed by: INTERNAL MEDICINE

## 2019-11-23 PROCEDURE — 84100 ASSAY OF PHOSPHORUS: CPT | Mod: NTX

## 2019-11-23 PROCEDURE — 85610 PROTHROMBIN TIME: CPT | Mod: NTX

## 2019-11-23 PROCEDURE — 83735 ASSAY OF MAGNESIUM: CPT | Mod: NTX

## 2019-11-23 PROCEDURE — 25000003 PHARM REV CODE 250: Mod: NTX | Performed by: PHYSICIAN ASSISTANT

## 2019-11-23 PROCEDURE — 63600367 HC NITRIC OXIDE PER HOUR: Mod: NTX

## 2019-11-23 PROCEDURE — 63600175 PHARM REV CODE 636 W HCPCS: Mod: NTX | Performed by: PHYSICIAN ASSISTANT

## 2019-11-23 PROCEDURE — 99900035 HC TECH TIME PER 15 MIN (STAT): Mod: NTX

## 2019-11-23 PROCEDURE — 27000221 HC OXYGEN, UP TO 24 HOURS: Mod: NTX

## 2019-11-23 PROCEDURE — 93750 PR INTERROGATE VENT ASSIST DEV, IN PERSON, W PHYSICIAN ANALYSIS: ICD-10-PCS | Mod: NTX,,, | Performed by: INTERNAL MEDICINE

## 2019-11-23 PROCEDURE — 99291 PR CRITICAL CARE, E/M 30-74 MINUTES: ICD-10-PCS | Mod: 25,NTX,, | Performed by: INTERNAL MEDICINE

## 2019-11-23 PROCEDURE — 94761 N-INVAS EAR/PLS OXIMETRY MLT: CPT | Mod: NTX

## 2019-11-23 PROCEDURE — 80048 BASIC METABOLIC PNL TOTAL CA: CPT | Mod: NTX

## 2019-11-23 PROCEDURE — 85730 THROMBOPLASTIN TIME PARTIAL: CPT | Mod: NTX

## 2019-11-23 RX ORDER — POLYETHYLENE GLYCOL 3350 17 G/17G
17 POWDER, FOR SOLUTION ORAL DAILY
Status: DISCONTINUED | OUTPATIENT
Start: 2019-11-24 | End: 2019-11-25

## 2019-11-23 RX ORDER — POLYETHYLENE GLYCOL 3350 17 G/17G
17 POWDER, FOR SOLUTION ORAL ONCE
Status: COMPLETED | OUTPATIENT
Start: 2019-11-23 | End: 2019-11-23

## 2019-11-23 RX ADMIN — DOCUSATE SODIUM 50 MG: 50 CAPSULE, LIQUID FILLED ORAL at 01:11

## 2019-11-23 RX ADMIN — FUROSEMIDE 80 MG: 10 INJECTION, SOLUTION INTRAMUSCULAR; INTRAVENOUS at 06:11

## 2019-11-23 RX ADMIN — ASPIRIN 325 MG: 325 TABLET, DELAYED RELEASE ORAL at 09:11

## 2019-11-23 RX ADMIN — SENNOSIDES AND DOCUSATE SODIUM 2 TABLET: 8.6; 5 TABLET ORAL at 09:11

## 2019-11-23 RX ADMIN — VENLAFAXINE 150 MG: 37.5 TABLET ORAL at 09:11

## 2019-11-23 RX ADMIN — POLYETHYLENE GLYCOL 3350 17 G: 17 POWDER, FOR SOLUTION ORAL at 04:11

## 2019-11-23 RX ADMIN — AMIODARONE HYDROCHLORIDE 400 MG: 200 TABLET ORAL at 09:11

## 2019-11-23 RX ADMIN — MIRTAZAPINE 15 MG: 15 TABLET, FILM COATED ORAL at 09:11

## 2019-11-23 RX ADMIN — PANTOPRAZOLE SODIUM 40 MG: 40 TABLET, DELAYED RELEASE ORAL at 09:11

## 2019-11-23 RX ADMIN — GABAPENTIN 200 MG: 100 CAPSULE ORAL at 09:11

## 2019-11-23 RX ADMIN — MEXILETINE HYDROCHLORIDE 150 MG: 150 CAPSULE ORAL at 05:11

## 2019-11-23 RX ADMIN — LISINOPRIL 10 MG: 10 TABLET ORAL at 09:11

## 2019-11-23 RX ADMIN — FUROSEMIDE 80 MG: 10 INJECTION, SOLUTION INTRAMUSCULAR; INTRAVENOUS at 08:11

## 2019-11-23 RX ADMIN — MEXILETINE HYDROCHLORIDE 150 MG: 150 CAPSULE ORAL at 09:11

## 2019-11-23 RX ADMIN — SPIRONOLACTONE 12.5 MG: 25 TABLET, FILM COATED ORAL at 09:11

## 2019-11-23 RX ADMIN — MEXILETINE HYDROCHLORIDE 150 MG: 150 CAPSULE ORAL at 01:11

## 2019-11-23 RX ADMIN — WARFARIN SODIUM 2 MG: 2 TABLET ORAL at 04:11

## 2019-11-23 RX ADMIN — ALPRAZOLAM 0.25 MG: 0.25 TABLET ORAL at 11:11

## 2019-11-23 RX ADMIN — MAGNESIUM OXIDE TAB 400 MG (241.3 MG ELEMENTAL MG) 400 MG: 400 (241.3 MG) TAB at 09:11

## 2019-11-23 RX ADMIN — PROMETHAZINE HYDROCHLORIDE 12.5 MG: 12.5 TABLET ORAL at 08:11

## 2019-11-23 NOTE — NURSING
MD notified of concern related to patient's increased work of breathing and appearing more restless and tired than prior in shift. No new orders at this time. Will continue to monitor.

## 2019-11-23 NOTE — PLAN OF CARE
CMICU DAILY GOALS       A: Awake    RASS: Goal - RASS Goal: 0-->alert and calm  Actual - RASS (Patel Agitation-Sedation Scale): 0-->alert and calm   Restraint necessity:    B: Breath   SBT: Not intubated   C: Coordinate A & B, analgesics/sedatives   Pain: managed    SAT: Not intubated  D: Delirium   CAM-ICU: Overall CAM-ICU: Negative  E: Early Mobility   MOVE Screen: Pass   Activity: Activity Management: activity adjusted per tolerance  FAS: Feeding/Nutrition   Diet order: Diet/Nutrition Received: 2 gram sodium,   Fluid restriction: Fluid Requirement: 1500 cc FR  T: Thrombus   DVT prophylaxis: VTE Required Core Measure: Pharmacological prophylaxis initiated/maintained  H: HOB Elevation   Head of Bed (HOB): HOB at 30-45 degrees  U: Ulcer Prophylaxis   GI: yes  G: Glucose control   managed    S: Skin   Bundle compliance: yes   Bathing/Skin Care: linen changed, bath, partial(during bath patient had runs of Vtach ) Date: 11/22/19 (AM shift)  B: Bowel Function   constipation   I: Indwelling Catheters   Fonseca necessity:     CVC necessity: Yes   IPAD offered: No  D: De-escalation Antibx   No  Plan for the day   Trend CVP, monitor VAD alarms  Family/Goals of care/Code Status   Code Status: Full Code     No acute events throughout day. No VAD alarms. CVP 8/8/7, SVO2 68. VS and assessment per flow sheet, patient progressing towards goals as tolerated, plan of care reviewed with Deborah Navsa and family, all concerns addressed, will continue to monitor.

## 2019-11-23 NOTE — PLAN OF CARE
CMICU DAILY GOALS       A: Awake    RASS: Goal - RASS Goal: 0-->alert and calm  Actual - RASS (Patel Agitation-Sedation Scale): 0-->alert and calm   Restraint necessity:    B: Breath   SBT: Not intubated   C: Coordinate A & B, analgesics/sedatives   Pain: managed    SAT: Not intubated  D: Delirium   CAM-ICU: Overall CAM-ICU: Negative  E: Early Mobility   MOVE Screen: Pass   Activity: Activity Management: activity adjusted per tolerance  FAS: Feeding/Nutrition   Diet order: Diet/Nutrition Received: 2 gram sodium,   Fluid restriction: Fluid Requirement: 1500 cc FR  T: Thrombus   DVT prophylaxis: VTE Required Core Measure: Pharmacological prophylaxis initiated/maintained  H: HOB Elevation   Head of Bed (HOB): HOB at 30-45 degrees  U: Ulcer Prophylaxis   GI: no  G: Glucose control   managed    S: Skin   Bundle compliance: yes   Bathing/Skin Care: back care, bath, chlorhexidine, bath, complete, linen changed Date: 11/23 am shift   B: Bowel Function   constipation   I: Indwelling Catheters   Fonseca necessity:     CVC necessity: Yes   IPAD offered: No  D: De-escalation Antibx   No  Plan for the day  Have a BM; no LVAD alarms; no VTach episodes   Family/Goals of care/Code Status   Code Status: Full Code     No acute events throughout day, VS and assessment per flow sheet, patient progressing towards goals as tolerated, plan of care reviewed with Deborah Navas and family, all concerns addressed, will continue to monitor.

## 2019-11-23 NOTE — PROGRESS NOTES
[facility]  Heart Transplant  Progress Note    Patient Name: Deborah Navas  MRN: [id number]  Admission Date: 11/13/2019  Hospital Length of Stay: 8 days  Attending Physician: Renetta Chaudhari MD  Primary Care Provider: Primary Doctor No  Principal Problem:Left ventricular assist device (LVAD) complication    Subjective:   No acute events overnight. Had couple episodes of symptomatic NSVT (< 10 seconds each).    Scheduled Meds:   amiodarone  400 mg Oral Daily    aspirin  325 mg Oral Daily    furosemide  80 mg Intravenous BID    gabapentin  200 mg Oral BID    hepatitis A virus vaccine (PF)  1,440 Units Intramuscular Once    lisinopril  10 mg Oral BID    magnesium oxide  400 mg Oral Daily    mexiletine  150 mg Oral Q8H    mirtazapine  15 mg Oral QHS    pantoprazole  40 mg Oral Daily    spironolactone  12.5 mg Oral Daily    venlafaxine  150 mg Oral Daily    warfarin  2 mg Oral Daily     Continuous Infusions:   sodium chloride 0.9%      epinephrine 0.02 mcg/kg/min (11/23/19 0600)    nitric oxide gas       PRN Meds:.sodium chloride 0.9%, ALPRAZolam, pneumoc 13-amber conj-dip cr(PF), promethazine, senna-docusate 8.6-50 mg, zolpidem    Review of patient's allergies indicates:   Allergen Reactions    Adhesive Blisters     Reaction to area in chest and up only    Codeine Itching     Objective:    Temp:  [97.7 °F (36.5 °C)-98.4 °F (36.9 °C)] 98.4 °F (36.9 °C)  Pulse:  [] 185  Resp:  [14-45] 37  SpO2:  [98 %-99 %] 98 %  BP: (78-90)/(0) 78/0    Intake/Output Summary (Last 24 hours) at 11/23/2019 1035  Last data filed at 11/23/2019 1000  Gross per 24 hour   Intake 679 ml   Output 3650 ml   Net -2971 ml     Wt Readings from Last 1 Encounters:   11/23/19 0500 101.2 kg (223 lb 3.5 oz)   11/22/19 0500 100.8 kg (222 lb 3.6 oz)   11/21/19 0701 99.9 kg (220 lb 3.8 oz)   11/21/19 0500 99.9 kg (220 lb 3.8 oz)   11/20/19 1600 100.8 kg (222 lb 3.6 oz)   11/20/19 0500 100.3 kg (221 lb 1.9 oz)    11/19/19 0500 103.9 kg (229 lb 0.9 oz)   11/18/19 0500 103.7 kg (228 lb 9.9 oz)   11/17/19 0447 102.4 kg (225 lb 12 oz)   11/16/19 0600 100.9 kg (222 lb 7.1 oz)   11/15/19 0500 101 kg (222 lb 10.6 oz)   11/14/19 0500 101.5 kg (223 lb 12.3 oz)   11/13/19 1437 107 kg (235 lb 14.3 oz)     Physical Exam   Constitutional: She is oriented to person, place, and time. She appears well-developed and well-nourished.   HENT:   Mouth/Throat: Oropharynx is clear and moist.   Eyes: EOM are normal.   Neck: No JVD present.   Cardiovascular: Normal rate, regular rhythm and intact distal pulses.   Smooth VAD hum   Pulmonary/Chest: Effort normal and breath sounds normal. No respiratory distress. She has no rales.   Abdominal: Soft. Bowel sounds are normal. She exhibits no distension. There is no tenderness. There is no guarding.   Musculoskeletal: She exhibits no edema.   Neurological: She is alert and oriented to person, place, and time.   Skin: Skin is warm.   Psychiatric: She has a normal mood and affect.   Nursing note and vitals reviewed.    Labs:  Lab Results   Component Value Date    WBC 5.01 11/23/2019    RBC 4.23 11/23/2019    HGB 10.6 (L) 11/23/2019    HCT 36.1 (L) 11/23/2019    MCV 85 11/23/2019    MCH 25.1 (L) 11/23/2019    MCHC 29.4 (L) 11/23/2019    RDW 16.8 (H) 11/23/2019     11/23/2019    MPV 9.7 11/23/2019    GRAN 3.6 11/23/2019    GRAN 70.8 11/23/2019    LYMPH 0.9 (L) 11/23/2019    LYMPH 17.6 (L) 11/23/2019    MONO 0.4 11/23/2019    MONO 7.4 11/23/2019    EOS 0.2 11/23/2019    BASO 0.04 11/23/2019    EOSINOPHIL 3.0 11/23/2019    BASOPHIL 0.8 11/23/2019     CMP  Sodium   Date Value Ref Range Status   11/23/2019 141 136 - 145 mmol/L Final   11/01/2019 142  Final     Potassium   Date Value Ref Range Status   11/23/2019 3.8 3.5 - 5.1 mmol/L Final   11/01/2019 4.5  Final     Chloride   Date Value Ref Range Status   11/23/2019 102 95 - 110 mmol/L Final   10/10/2019 24  Final     CO2   Date Value Ref Range Status    11/23/2019 29 23 - 29 mmol/L Final     Glucose   Date Value Ref Range Status   11/23/2019 143 (H) 70 - 110 mg/dL Final     BUN, Bld   Date Value Ref Range Status   11/23/2019 36 (H) 6 - 20 mg/dL Final     BUN   Date Value Ref Range Status   11/01/2019 20 4 - 21 mg/dL Final     Creatinine   Date Value Ref Range Status   11/23/2019 1.9 (H) 0.5 - 1.4 mg/dL Final   11/01/2019 1.4 mg/dL Final     Calcium   Date Value Ref Range Status   11/23/2019 9.3 8.7 - 10.5 mg/dL Final   11/01/2019 8.5 mg/dL Final     Total Protein   Date Value Ref Range Status   11/22/2019 6.9 6.0 - 8.4 g/dL Final     Albumin   Date Value Ref Range Status   11/22/2019 3.3 (L) 3.5 - 5.2 g/dL Final     Total Bilirubin   Date Value Ref Range Status   11/22/2019 0.5 0.1 - 1.0 mg/dL Final     Comment:     For infants and newborns, interpretation of results should be based  on gestational age, weight and in agreement with clinical  observations.  Premature Infant recommended reference ranges:  Up to 24 hours.............<8.0 mg/dL  Up to 48 hours............<12.0 mg/dL  3-5 days..................<15.0 mg/dL  6-29 days.................<15.0 mg/dL       Alkaline Phosphatase   Date Value Ref Range Status   11/22/2019 92 55 - 135 U/L Final     AST   Date Value Ref Range Status   11/22/2019 21 10 - 40 U/L Final     ALT   Date Value Ref Range Status   11/22/2019 18 10 - 44 U/L Final     Anion Gap   Date Value Ref Range Status   11/23/2019 10 8 - 16 mmol/L Final     eGFR if    Date Value Ref Range Status   11/23/2019 34.9 (A) >60 mL/min/1.73 m^2 Final     eGFR if non    Date Value Ref Range Status   11/23/2019 30.3 (A) >60 mL/min/1.73 m^2 Final     Comment:     Calculation used to obtain the estimated glomerular filtration  rate (eGFR) is the CKD-EPI equation.        Lab Results   Component Value Date    INR 2.1 (H) 11/23/2019    INR 2.1 (H) 11/22/2019    INR 2.1 (H) 11/21/2019     Microbiology Results (last 7 days)     ** No  results found for the last 168 hours. **            Assessment and Plan:     Ms. Navas is a 48 yo WF with NICM, s/p HM3 implantation 9/10/19 (post up coarse uncomplicated with the exception of+ diaphragmatic stimulation of LV lead and pleural effusion with chest tube placement, atrial flutter with NADINE/DCCV), V-fib reported in setting of hypokalemia with appropriate shock from ICD on Amiodarone prior to LVAD placement; and recent hospitalizations for ventricular arrhythmias (now on Mexiletine) who was admitted for evaluation of multiple VTach episodes on recent ICD interrogation and low flow alarms on LVAD, admitted for ADHF; diuresing well.    # Left ventricular assist device (LVAD) complication: Hx low flow alarms prior to admit, last LFA 11/20; possibly secondary to ADHF.  - Echo shows IVS shifting to right side and LVED increased to 6.4.    - Formal report of CT unremarkable; however, it was a non contrast as creat was elevated above baseline on admit and inflow and outflow cannulas not accurately visualized     # LVAD (left ventricular assist device) present: HeartMate 3 Implanted 9/10/19 as DT by Dr. Walden    Procedure: Device Interrogation Including analysis of device parameters  Current Settings: Ventricular Assist Device  Review of device function is stable    TXP LVAD INTERROGATIONS 11/23/2019 11/23/2019 11/23/2019 11/23/2019 11/23/2019 11/23/2019 11/23/2019   Type HeartMate3 HeartMate3 HeartMate3 HeartMate3 HeartMate3 HeartMate3 HeartMate3   Flow 4.1 4.2 4.1 4.1 4.1 3.8 4.1   Speed 5300 5300 5300 5300 5300 5300 5300   PI 2.8 2.8 3.9 3.8 4.0 4.3 3.3   Power (Orellana) 3.9 3.7 3.7 3.8 3.8 3.9 3.9   LSL 4900 4900 4900 4900 4900 4900 4900   Pulsatility Intermittent pulse Intermittent pulse Intermittent pulse Intermittent pulse Intermittent pulse Intermittent pulse Intermittent pulse     - Continue ASA, Coumadin  -Not listed for OHTx: was declined due to pulmonary hypertension and incomplete care giving plan.  Patient now has care giving plan. 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    # ADHF: Last 2D Echo 11/13/19: LVEF 20%, LVEDD 6.3 cm with speed at 5300 and severe MR  - Continue Lasix 80 iv BID; strict I/Os and daily weights  - Continue Epinephrine gtt @ 0.02  - Continue Simon @ 10ppm  - Continue Lisinopril 10 BID, Spironolactone 12.5    # HTN:  - Continue Lisinopril 10 BID and Spironolactone 12.5    # Ventricular tachycardia: continues to have intermittent, symptomatic NSVT  - Continue Mexiletine 150 TID  - Continue Amiodarone 400 po qday    # GLO on CKD: Cr stable at 1.9 today (Baseline Cr ~1.3-1.6)  - Continue to monitor with daily BMP    Patient seen and discussed with Dr. Chaudhari.    Leonid Veloz MD., MPH  Heart Transplant Service  11/23/2019  10:49 AM

## 2019-11-24 LAB
ALLENS TEST: ABNORMAL
ANION GAP SERPL CALC-SCNC: 9 MMOL/L (ref 8–16)
APTT BLDCRRT: 33.1 SEC (ref 21–32)
BASOPHILS # BLD AUTO: 0.06 K/UL (ref 0–0.2)
BASOPHILS NFR BLD: 1.1 % (ref 0–1.9)
BUN SERPL-MCNC: 33 MG/DL (ref 6–20)
CALCIUM SERPL-MCNC: 9.1 MG/DL (ref 8.7–10.5)
CHLORIDE SERPL-SCNC: 101 MMOL/L (ref 95–110)
CO2 SERPL-SCNC: 30 MMOL/L (ref 23–29)
CREAT SERPL-MCNC: 1.6 MG/DL (ref 0.5–1.4)
DIFFERENTIAL METHOD: ABNORMAL
EOSINOPHIL # BLD AUTO: 0.2 K/UL (ref 0–0.5)
EOSINOPHIL NFR BLD: 3 % (ref 0–8)
ERYTHROCYTE [DISTWIDTH] IN BLOOD BY AUTOMATED COUNT: 16.5 % (ref 11.5–14.5)
EST. GFR  (AFRICAN AMERICAN): 43 ML/MIN/1.73 M^2
EST. GFR  (NON AFRICAN AMERICAN): 37.3 ML/MIN/1.73 M^2
GLUCOSE SERPL-MCNC: 127 MG/DL (ref 70–110)
HCO3 UR-SCNC: 31.6 MMOL/L (ref 24–28)
HCT VFR BLD AUTO: 37.1 % (ref 37–48.5)
HGB BLD-MCNC: 10.5 G/DL (ref 12–16)
IMM GRANULOCYTES # BLD AUTO: 0.02 K/UL (ref 0–0.04)
IMM GRANULOCYTES NFR BLD AUTO: 0.4 % (ref 0–0.5)
INR PPP: 2.1 (ref 0.8–1.2)
LDH SERPL L TO P-CCNC: 208 U/L (ref 110–260)
LYMPHOCYTES # BLD AUTO: 0.8 K/UL (ref 1–4.8)
LYMPHOCYTES NFR BLD: 15.7 % (ref 18–48)
MAGNESIUM SERPL-MCNC: 2.2 MG/DL (ref 1.6–2.6)
MCH RBC QN AUTO: 24.4 PG (ref 27–31)
MCHC RBC AUTO-ENTMCNC: 28.3 G/DL (ref 32–36)
MCV RBC AUTO: 86 FL (ref 82–98)
METHEMOGLOBIN: 0.3 % (ref 0–3)
MONOCYTES # BLD AUTO: 0.4 K/UL (ref 0.3–1)
MONOCYTES NFR BLD: 7.4 % (ref 4–15)
NEUTROPHILS # BLD AUTO: 3.8 K/UL (ref 1.8–7.7)
NEUTROPHILS NFR BLD: 72.4 % (ref 38–73)
NRBC BLD-RTO: 0 /100 WBC
PCO2 BLDA: 55.3 MMHG (ref 35–45)
PH SMN: 7.37 [PH] (ref 7.35–7.45)
PHOSPHATE SERPL-MCNC: 3.6 MG/DL (ref 2.7–4.5)
PLATELET # BLD AUTO: 297 K/UL (ref 150–350)
PMV BLD AUTO: 9.5 FL (ref 9.2–12.9)
PO2 BLDA: 34 MMHG (ref 40–60)
POC BE: 6 MMOL/L
POC SATURATED O2: 62 % (ref 95–100)
POC TCO2: 33 MMOL/L (ref 24–29)
POTASSIUM SERPL-SCNC: 4.2 MMOL/L (ref 3.5–5.1)
PROTHROMBIN TIME: 19.9 SEC (ref 9–12.5)
RBC # BLD AUTO: 4.31 M/UL (ref 4–5.4)
SAMPLE: ABNORMAL
SITE: ABNORMAL
SODIUM SERPL-SCNC: 140 MMOL/L (ref 136–145)
WBC # BLD AUTO: 5.27 K/UL (ref 3.9–12.7)

## 2019-11-24 PROCEDURE — 85610 PROTHROMBIN TIME: CPT | Mod: NTX

## 2019-11-24 PROCEDURE — 85025 COMPLETE CBC W/AUTO DIFF WBC: CPT | Mod: NTX

## 2019-11-24 PROCEDURE — 25000003 PHARM REV CODE 250: Mod: NTX | Performed by: INTERNAL MEDICINE

## 2019-11-24 PROCEDURE — 63600367 HC NITRIC OXIDE PER HOUR: Mod: NTX

## 2019-11-24 PROCEDURE — 93750 PR INTERROGATE VENT ASSIST DEV, IN PERSON, W PHYSICIAN ANALYSIS: ICD-10-PCS | Mod: NTX,,, | Performed by: INTERNAL MEDICINE

## 2019-11-24 PROCEDURE — 99291 CRITICAL CARE FIRST HOUR: CPT | Mod: 25,NTX,, | Performed by: INTERNAL MEDICINE

## 2019-11-24 PROCEDURE — 83735 ASSAY OF MAGNESIUM: CPT | Mod: NTX

## 2019-11-24 PROCEDURE — 82803 BLOOD GASES ANY COMBINATION: CPT | Mod: NTX

## 2019-11-24 PROCEDURE — 25000003 PHARM REV CODE 250: Mod: NTX | Performed by: PHYSICIAN ASSISTANT

## 2019-11-24 PROCEDURE — 80048 BASIC METABOLIC PNL TOTAL CA: CPT | Mod: NTX

## 2019-11-24 PROCEDURE — 84100 ASSAY OF PHOSPHORUS: CPT | Mod: NTX

## 2019-11-24 PROCEDURE — 99291 PR CRITICAL CARE, E/M 30-74 MINUTES: ICD-10-PCS | Mod: 25,NTX,, | Performed by: INTERNAL MEDICINE

## 2019-11-24 PROCEDURE — 99900035 HC TECH TIME PER 15 MIN (STAT): Mod: NTX

## 2019-11-24 PROCEDURE — 93750 INTERROGATION VAD IN PERSON: CPT | Mod: NTX,,, | Performed by: INTERNAL MEDICINE

## 2019-11-24 PROCEDURE — 94761 N-INVAS EAR/PLS OXIMETRY MLT: CPT | Mod: NTX

## 2019-11-24 PROCEDURE — 83050 HGB METHEMOGLOBIN QUAN: CPT | Mod: NTX

## 2019-11-24 PROCEDURE — 20000000 HC ICU ROOM: Mod: NTX

## 2019-11-24 PROCEDURE — 85730 THROMBOPLASTIN TIME PARTIAL: CPT | Mod: NTX

## 2019-11-24 PROCEDURE — 63600175 PHARM REV CODE 636 W HCPCS: Mod: NTX | Performed by: STUDENT IN AN ORGANIZED HEALTH CARE EDUCATION/TRAINING PROGRAM

## 2019-11-24 PROCEDURE — 27000248 HC VAD-ADDITIONAL DAY: Mod: NTX

## 2019-11-24 PROCEDURE — 63600175 PHARM REV CODE 636 W HCPCS: Mod: NTX | Performed by: PHYSICIAN ASSISTANT

## 2019-11-24 PROCEDURE — 27000221 HC OXYGEN, UP TO 24 HOURS: Mod: NTX

## 2019-11-24 PROCEDURE — 83615 LACTATE (LD) (LDH) ENZYME: CPT | Mod: NTX

## 2019-11-24 RX ADMIN — AMIODARONE HYDROCHLORIDE 400 MG: 200 TABLET ORAL at 08:11

## 2019-11-24 RX ADMIN — ASPIRIN 325 MG: 325 TABLET, DELAYED RELEASE ORAL at 08:11

## 2019-11-24 RX ADMIN — LISINOPRIL 10 MG: 10 TABLET ORAL at 08:11

## 2019-11-24 RX ADMIN — ALPRAZOLAM 0.25 MG: 0.25 TABLET ORAL at 01:11

## 2019-11-24 RX ADMIN — VENLAFAXINE 150 MG: 37.5 TABLET ORAL at 08:11

## 2019-11-24 RX ADMIN — WARFARIN SODIUM 2 MG: 2 TABLET ORAL at 06:11

## 2019-11-24 RX ADMIN — PANTOPRAZOLE SODIUM 40 MG: 40 TABLET, DELAYED RELEASE ORAL at 08:11

## 2019-11-24 RX ADMIN — GABAPENTIN 200 MG: 100 CAPSULE ORAL at 08:11

## 2019-11-24 RX ADMIN — LISINOPRIL 10 MG: 10 TABLET ORAL at 09:11

## 2019-11-24 RX ADMIN — MEXILETINE HYDROCHLORIDE 150 MG: 150 CAPSULE ORAL at 09:11

## 2019-11-24 RX ADMIN — FUROSEMIDE 80 MG: 10 INJECTION, SOLUTION INTRAMUSCULAR; INTRAVENOUS at 08:11

## 2019-11-24 RX ADMIN — POLYETHYLENE GLYCOL 3350 17 G: 17 POWDER, FOR SOLUTION ORAL at 08:11

## 2019-11-24 RX ADMIN — MEXILETINE HYDROCHLORIDE 150 MG: 150 CAPSULE ORAL at 01:11

## 2019-11-24 RX ADMIN — DOCUSATE SODIUM 50 MG: 50 CAPSULE, LIQUID FILLED ORAL at 08:11

## 2019-11-24 RX ADMIN — ZOLPIDEM TARTRATE 5 MG: 5 TABLET ORAL at 09:11

## 2019-11-24 RX ADMIN — GABAPENTIN 200 MG: 100 CAPSULE ORAL at 09:11

## 2019-11-24 RX ADMIN — EPINEPHRINE 0.02 MCG/KG/MIN: 1 INJECTION PARENTERAL at 03:11

## 2019-11-24 RX ADMIN — MAGNESIUM OXIDE TAB 400 MG (241.3 MG ELEMENTAL MG) 400 MG: 400 (241.3 MG) TAB at 08:11

## 2019-11-24 RX ADMIN — MEXILETINE HYDROCHLORIDE 150 MG: 150 CAPSULE ORAL at 06:11

## 2019-11-24 RX ADMIN — FUROSEMIDE 80 MG: 10 INJECTION, SOLUTION INTRAMUSCULAR; INTRAVENOUS at 06:11

## 2019-11-24 RX ADMIN — SPIRONOLACTONE 12.5 MG: 25 TABLET, FILM COATED ORAL at 08:11

## 2019-11-24 RX ADMIN — MIRTAZAPINE 15 MG: 15 TABLET, FILM COATED ORAL at 09:11

## 2019-11-24 NOTE — PLAN OF CARE
[facility] DAILY GOALS       A: Awake    RASS: Goal - RASS Goal: 0-->alert and calm  Actual - RASS (Patel Agitation-Sedation Scale): 0-->alert and calm   Restraint necessity:    B: Breath   SBT: Not intubated   C: Coordinate A & B, analgesics/sedatives   Pain: managed    SAT: Pass  D: Delirium   CAM-ICU: Overall CAM-ICU: Negative  E: Early Mobility   MOVE Screen: Pass   Activity: Activity Management: activity clustered for rest period, activity adjusted per tolerance  FAS: Feeding/Nutrition   Diet order: Diet/Nutrition Received: 2 gram sodium,   Fluid restriction: Fluid Requirement: 1500 cc FR   T: Thrombus   DVT prophylaxis: VTE Required Core Measure: Pharmacological prophylaxis initiated/maintained  H: HOB Elevation   Head of Bed (HOB): HOB flat  U: Ulcer Prophylaxis   GI: no  G: Glucose control   managed    S: Skin   Bundle compliance: yes   Bathing/Skin Care: back care, bath, chlorhexidine, bath, complete, linen changed Date: 11/24 am shift   B: Bowel Function   no issues   I: Indwelling Catheters   Fonseca necessity:     CVC necessity: Yes   IPAD offered: No  D: De-escalation Antibx   No  Plan for the day   Stay the course; possibly wean NO and epi tomorrow   Family/Goals of care/Code Status   Code Status: Full Code     No acute events throughout day, VS and assessment per flow sheet, patient progressing towards goals as tolerated, plan of care reviewed with Deborah Navas and family, all concerns addressed, will continue to monitor.

## 2019-11-24 NOTE — PLAN OF CARE
VS and assessment per flow sheet, see below for updates:    Pulmonary: Pt on 4L NC and Nitric @ 10ppm. Pulse ox q4, O2 sats %. Lung sounds clear. SVO2 62.    Cardiovascular: Pt has Hm3 in place - speed set at 5300. No VAD alarms overnight. Pt is 100% paced, HR 80s-90s. Doppplers 80, 82, 84. Pt is intermittently pulsatile. CVP 8, 8, 9.    Neurological: Pt AAOx4, stand by assist, afebrile. No overt deficits noted.     Gastrointestinal: Pt had 1 small BM. Bowel sounds audible and active.     Genitourinary: Pt voids spontaneously in bedside commode. UO adequate.     Endocrine: n/a.    Skin/Bath: Skin intact. LVAD driveline site 1.  Date of last CHG bath given: 11/23 on AM shift.     Infusions: Pt on epi at 0.02.     CM[facility] DAILY GOALS       A: Awake    RASS: Goal - RASS Goal: 0-->alert and calm  Actual - RASS (Patel Agitation-Sedation Scale): 0-->alert and calm   Restraint necessity:    B: Breath   SBT: Not intubated   C: Coordinate A & B, analgesics/sedatives   Pain: managed    SAT: Not intubated  D: Delirium   CAM-ICU: Overall CAM-ICU: Negative  E: Early Mobility   MOVE Screen: Pass   Activity: Activity Management: activity adjusted per tolerance, activity clustered for rest period  FAS: Feeding/Nutrition   Diet order: Diet/Nutrition Received: 2 gram sodium(cardiac),   Fluid restriction: Fluid Requirement: 1500 cc FR  T: Thrombus   DVT prophylaxis: VTE Required Core Measure: Pharmacological prophylaxis initiated/maintained  H: HOB Elevation   Head of Bed (HOB): HOB at 30-45 degrees  U: Ulcer Prophylaxis   GI: yes  G: Glucose control   managed    S: Skin   Bundle compliance: yes   Bathing/Skin Care: back care, bath, chlorhexidine, bath, complete, linen changed Date: 11/23 on AM shift.  B: Bowel Function   constipation   I: Indwelling Catheters   Fonseca necessity:  no   CVC necessity: Yes   IPAD offered: Not appropriate  D: De-escalation Antibx   Pt not on abx.    Plan for the day   Plan for the day is to  potentially wean nitric or epi and monitor CVP/LVAD for flow alarms/and SVO2.   Family/Goals of care/Code Status   Code Status: Full Code     No acute events throughout day, VS and assessment per flow sheet, patient progressing towards goals as tolerated, plan of care reviewed with Deborah Navas and family, all concerns addressed, will continue to monitor.      Rita Ohara RN

## 2019-11-24 NOTE — PROGRESS NOTES
11/24/2019  Joaquin Cobb    Current provider:  Renetta Chaudhari MD      I, Joaquin Cobb, rounded on Deborah Navas to ensure all mechanical assist device settings (IABP or VAD) were appropriate and all parameters were within limits.  I was able to ensure all back up equipment was present, the staff had no issues, and the Perfusion Department daily rounding was complete.    9:07 AM

## 2019-11-24 NOTE — PROGRESS NOTES
11/23/2019  Joaquin Cobb    Current provider:  Renetta Chaudhari MD      I, Joaquin Cobb, rounded on Deborah Navas to ensure all mechanical assist device settings (IABP or VAD) were appropriate and all parameters were within limits.  I was able to ensure all back up equipment was present, the staff had no issues, and the Perfusion Department daily rounding was complete.    11:25 PM

## 2019-11-24 NOTE — NURSING
"MD notified of patient's run of VTACH. Patient symptomatic at this time reporting, "this one is real, I feel dizzy and lightheaded. I need to lie down." No LVAD alarms during this time. Doppler 78/0 and CVP 8. Both findings are unchanged from prior assessments. Patient self corrected rhythm backed to a paced rhythm of 80 bpm within 10 seconds of episode. VSS. Will continue to monitor.   "

## 2019-11-24 NOTE — PROGRESS NOTES
[facility]  Heart Transplant  Progress Note    Patient Name: Deborah Navas  MRN: [id number]  Admission Date: 11/13/2019  Hospital Length of Stay: 9 days  Attending Physician: Renetta Chaudhari MD  Primary Care Provider: Primary Doctor No  Principal Problem:Left ventricular assist device (LVAD) complication    Subjective:   No acute events overnight. Has documented HR in 200s around 6pm last night- artifact per nursing. Had one episode of NSVT during the day yesterday.      Scheduled Meds:   amiodarone  400 mg Oral Daily    aspirin  325 mg Oral Daily    docusate sodium  50 mg Oral Daily    furosemide  80 mg Intravenous BID    gabapentin  200 mg Oral BID    hepatitis A virus vaccine (PF)  1,440 Units Intramuscular Once    lisinopril  10 mg Oral BID    magnesium oxide  400 mg Oral Daily    mexiletine  150 mg Oral Q8H    mirtazapine  15 mg Oral QHS    pantoprazole  40 mg Oral Daily    polyethylene glycol  17 g Oral Daily    spironolactone  12.5 mg Oral Daily    venlafaxine  150 mg Oral Daily    warfarin  2 mg Oral Daily     Continuous Infusions:   sodium chloride 0.9%      epinephrine 0.02 mcg/kg/min (11/24/19 0701)    nitric oxide gas       PRN Meds:.sodium chloride 0.9%, ALPRAZolam, pneumoc 13-amber conj-dip cr(PF), promethazine, senna-docusate 8.6-50 mg, zolpidem    Review of patient's allergies indicates:   Allergen Reactions    Adhesive Blisters     Reaction to area in chest and up only    Codeine Itching     Objective:    Temp:  [97.7 °F (36.5 °C)-98.3 °F (36.8 °C)] 98 °F (36.7 °C)  Pulse:  [] 82  Resp:  [10-37] 16  SpO2:  [96 %-99 %] 98 %  BP: (78-84)/(0) 84/0    Intake/Output Summary (Last 24 hours) at 11/24/2019 0710  Last data filed at 11/24/2019 0701  Gross per 24 hour   Intake 1252 ml   Output 3650 ml   Net -2398 ml     Wt Readings from Last 1 Encounters:   11/24/19 0500 101.9 kg (224 lb 10.4 oz)   11/23/19 0500 101.2 kg (223 lb 3.5 oz)   11/22/19 0500 100.8  kg (222 lb 3.6 oz)   11/21/19 0701 99.9 kg (220 lb 3.8 oz)   11/21/19 0500 99.9 kg (220 lb 3.8 oz)   11/20/19 1600 100.8 kg (222 lb 3.6 oz)   11/20/19 0500 100.3 kg (221 lb 1.9 oz)   11/19/19 0500 103.9 kg (229 lb 0.9 oz)   11/18/19 0500 103.7 kg (228 lb 9.9 oz)   11/17/19 0447 102.4 kg (225 lb 12 oz)   11/16/19 0600 100.9 kg (222 lb 7.1 oz)   11/15/19 0500 101 kg (222 lb 10.6 oz)   11/14/19 0500 101.5 kg (223 lb 12.3 oz)   11/13/19 1437 107 kg (235 lb 14.3 oz)     Hemodynamics:  SVO2:62  CO: 4.42  CI: 2.01  SV: 52.65  SVR: 1104    CVP: 6 @ 8AM this morning    Physical Exam   Constitutional: She is oriented to person, place, and time. She appears well-developed and well-nourished.   HENT:   Mouth/Throat: Oropharynx is clear and moist.   Eyes: EOM are normal.   Neck: No JVD present.   Cardiovascular: Normal rate, regular rhythm and intact distal pulses.   Smooth VAD hum   Pulmonary/Chest: on NC; Effort normal and breath sounds normal. No respiratory distress. She has no rales.   Abdominal: Soft. Bowel sounds are normal. She exhibits no distension. There is no tenderness. There is no guarding.   Musculoskeletal: She exhibits no edema.   Neurological: She is alert and oriented to person, place, and time.   Skin: Skin is warm.   Psychiatric: She has a normal mood and affect.   Nursing note and vitals reviewed.    Labs:  Lab Results   Component Value Date    WBC 5.27 11/24/2019    RBC 4.31 11/24/2019    HGB 10.5 (L) 11/24/2019    HCT 37.1 11/24/2019    MCV 86 11/24/2019    MCH 24.4 (L) 11/24/2019    MCHC 28.3 (L) 11/24/2019    RDW 16.5 (H) 11/24/2019     11/24/2019    MPV 9.5 11/24/2019    GRAN 3.8 11/24/2019    GRAN 72.4 11/24/2019    LYMPH 0.8 (L) 11/24/2019    LYMPH 15.7 (L) 11/24/2019    MONO 0.4 11/24/2019    MONO 7.4 11/24/2019    EOS 0.2 11/24/2019    BASO 0.06 11/24/2019    EOSINOPHIL 3.0 11/24/2019    BASOPHIL 1.1 11/24/2019     CMP  Sodium   Date Value Ref Range Status   11/24/2019 140 136 - 145 mmol/L  Final   11/01/2019 142  Final     Potassium   Date Value Ref Range Status   11/24/2019 4.2 3.5 - 5.1 mmol/L Final   11/01/2019 4.5  Final     Chloride   Date Value Ref Range Status   11/24/2019 101 95 - 110 mmol/L Final   10/10/2019 24  Final     CO2   Date Value Ref Range Status   11/24/2019 30 (H) 23 - 29 mmol/L Final     Glucose   Date Value Ref Range Status   11/24/2019 127 (H) 70 - 110 mg/dL Final     BUN, Bld   Date Value Ref Range Status   11/24/2019 33 (H) 6 - 20 mg/dL Final     BUN   Date Value Ref Range Status   11/01/2019 20 4 - 21 mg/dL Final     Creatinine   Date Value Ref Range Status   11/24/2019 1.6 (H) 0.5 - 1.4 mg/dL Final   11/01/2019 1.4 mg/dL Final     Calcium   Date Value Ref Range Status   11/24/2019 9.1 8.7 - 10.5 mg/dL Final   11/01/2019 8.5 mg/dL Final     Total Protein   Date Value Ref Range Status   11/22/2019 6.9 6.0 - 8.4 g/dL Final     Albumin   Date Value Ref Range Status   11/22/2019 3.3 (L) 3.5 - 5.2 g/dL Final     Total Bilirubin   Date Value Ref Range Status   11/22/2019 0.5 0.1 - 1.0 mg/dL Final     Comment:     For infants and newborns, interpretation of results should be based  on gestational age, weight and in agreement with clinical  observations.  Premature Infant recommended reference ranges:  Up to 24 hours.............<8.0 mg/dL  Up to 48 hours............<12.0 mg/dL  3-5 days..................<15.0 mg/dL  6-29 days.................<15.0 mg/dL       Alkaline Phosphatase   Date Value Ref Range Status   11/22/2019 92 55 - 135 U/L Final     AST   Date Value Ref Range Status   11/22/2019 21 10 - 40 U/L Final     ALT   Date Value Ref Range Status   11/22/2019 18 10 - 44 U/L Final     Anion Gap   Date Value Ref Range Status   11/24/2019 9 8 - 16 mmol/L Final     eGFR if    Date Value Ref Range Status   11/24/2019 43.0 (A) >60 mL/min/1.73 m^2 Final     eGFR if non    Date Value Ref Range Status   11/24/2019 37.3 (A) >60 mL/min/1.73 m^2 Final      Comment:     Calculation used to obtain the estimated glomerular filtration  rate (eGFR) is the CKD-EPI equation.        Lab Results   Component Value Date    INR 2.1 (H) 11/24/2019    INR 2.1 (H) 11/23/2019    INR 2.1 (H) 11/22/2019     Microbiology Results (last 7 days)     ** No results found for the last 168 hours. **            Assessment and Plan:   Ms. Navas is a 50 yo WF with NICM, s/p HM3 implantation 9/10/19 (post up coarse uncomplicated with the exception of+ diaphragmatic stimulation of LV lead and pleural effusion with chest tube placement, atrial flutter with NADINE/DCCV), V-fib reported in setting of hypokalemia with appropriate shock from ICD on Amiodarone prior to LVAD placement; and recent hospitalizations for ventricular arrhythmias (now on Mexiletine) who was admitted for evaluation of multiple VTach episodes on recent ICD interrogation and low flow alarms on LVAD, admitted for ADHF; diuresing well.    # Left ventricular assist device (LVAD) complication: Hx low flow alarms prior to admit, last LFA 11/20; possibly secondary to ADHF.  - Echo shows IVS shifting to right side and LVED increased to 6.4.    - Formal report of CT unremarkable; however, it was a non contrast as creat was elevated above baseline on admit and inflow and outflow cannulas not accurately visualized     # LVAD (left ventricular assist device) present: HeartMate 3 Implanted 9/10/19 as DT by Dr. Walden    Procedure: Device Interrogation Including analysis of device parameters  Current Settings: Ventricular Assist Device  Review of device function is stable    TXP LVAD INTERROGATIONS 11/24/2019 11/24/2019 11/24/2019 11/24/2019 11/24/2019 11/24/2019 11/24/2019   Type HeartMate3 HeartMate3 HeartMate3 HeartMate3 HeartMate3 HeartMate3 HeartMate3   Flow 4.2 4.0 4.1 4.0 3.8 4.4 4.4   Speed 5300 5300 5300 5300 5300 5300 5300   PI 4.0 3.8 4.0 3.4 4.6 3.8 2.9   Power (Orellana) 3.8 3.7 3.7 3.5 3.7 3.8 3.8   LSL 4900 4900 4900 4900 4900 4900  4900   Pulsatility Intermittent pulse Intermittent pulse Intermittent pulse Intermittent pulse Intermittent pulse Intermittent pulse Intermittent pulse     - Continue ASA, Coumadin  -Not listed for OHTx: was declined due to pulmonary hypertension and incomplete care giving plan. Patient now has care giving plan. 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    # ADHF: Last 2D Echo 11/13/19: LVEF 20%, LVEDD 6.3 cm with speed at 5300 and severe MR  - SVO2 62 with CVP of 6 this morning (see above for complete hemodynamics)  - Continue Lasix 80 iv BID; strict I/Os and daily weights  - Continue Epinephrine gtt @ 0.02  - Continue Simon @ 10ppm  - Continue Lisinopril 10 BID, Spironolactone 12.5    # HTN:  - Continue Lisinopril 10 BID and Spironolactone 12.5    # Ventricular tachycardia: continues to have intermittent, symptomatic NSVT  - Continue Mexiletine 150 TID  - Continue Amiodarone 400 po qday    # GLO on CKD: Cr improved at 1.6 today (<--- 1.9 yesterday with baseline Cr ~1.3-1.6)  - Continue to monitor with daily BMP    Dispo: Pending adequate diuresis, weaning Simon/Epi    Patient seen and discussed with Dr. Chaudhari.    Leonid Veloz MD., MPH  Heart Transplant Service  11/24/2019  7:10 AM

## 2019-11-25 ENCOUNTER — DOCUMENTATION ONLY (OUTPATIENT)
Dept: TRANSFUSION MEDICINE | Facility: HOSPITAL | Age: 49
End: 2019-11-25
Payer: COMMERCIAL

## 2019-11-25 ENCOUNTER — TELEPHONE (OUTPATIENT)
Dept: ELECTROPHYSIOLOGY | Facility: CLINIC | Age: 49
End: 2019-11-25

## 2019-11-25 DIAGNOSIS — Z76.82 HEART TRANSPLANT CANDIDATE: ICD-10-CM

## 2019-11-25 LAB
ALBUMIN SERPL BCP-MCNC: 3.5 G/DL (ref 3.5–5.2)
ALLENS TEST: ABNORMAL
ALLENS TEST: ABNORMAL
ALP SERPL-CCNC: 90 U/L (ref 55–135)
ALT SERPL W/O P-5'-P-CCNC: 15 U/L (ref 10–44)
ANION GAP SERPL CALC-SCNC: 10 MMOL/L (ref 8–16)
ANION GAP SERPL CALC-SCNC: 12 MMOL/L (ref 8–16)
APTT BLDCRRT: 31.3 SEC (ref 21–32)
ASCENDING AORTA: 3.34 CM
AST SERPL-CCNC: 19 U/L (ref 10–40)
BASOPHILS # BLD AUTO: 0.06 K/UL (ref 0–0.2)
BASOPHILS NFR BLD: 1.1 % (ref 0–1.9)
BILIRUB DIRECT SERPL-MCNC: 0.3 MG/DL (ref 0.1–0.3)
BILIRUB SERPL-MCNC: 0.5 MG/DL (ref 0.1–1)
BNP SERPL-MCNC: 192 PG/ML (ref 0–99)
BSA FOR ECHO PROCEDURE: 2.19 M2
BUN SERPL-MCNC: 29 MG/DL (ref 6–20)
BUN SERPL-MCNC: 31 MG/DL (ref 6–20)
CALCIUM SERPL-MCNC: 9.7 MG/DL (ref 8.7–10.5)
CALCIUM SERPL-MCNC: 9.8 MG/DL (ref 8.7–10.5)
CHLORIDE SERPL-SCNC: 100 MMOL/L (ref 95–110)
CHLORIDE SERPL-SCNC: 99 MMOL/L (ref 95–110)
CO2 SERPL-SCNC: 29 MMOL/L (ref 23–29)
CO2 SERPL-SCNC: 32 MMOL/L (ref 23–29)
CREAT SERPL-MCNC: 1.5 MG/DL (ref 0.5–1.4)
CREAT SERPL-MCNC: 1.5 MG/DL (ref 0.5–1.4)
CRP SERPL-MCNC: 13.8 MG/L (ref 0–8.2)
CV ECHO LV RWT: 0.35 CM
DELSYS: ABNORMAL
DIFFERENTIAL METHOD: ABNORMAL
DOP CALC LVOT AREA: 3 CM2
DOP CALC LVOT DIAMETER: 1.96 CM
E WAVE DECELERATION TIME: 96.64 MSEC
E/A RATIO: 1.46
ECHO LV POSTERIOR WALL: 0.79 CM (ref 0.6–1.1)
EOSINOPHIL # BLD AUTO: 0.2 K/UL (ref 0–0.5)
EOSINOPHIL NFR BLD: 3.6 % (ref 0–8)
ERYTHROCYTE [DISTWIDTH] IN BLOOD BY AUTOMATED COUNT: 16.7 % (ref 11.5–14.5)
EST. GFR  (AFRICAN AMERICAN): 46.5 ML/MIN/1.73 M^2
EST. GFR  (AFRICAN AMERICAN): 46.5 ML/MIN/1.73 M^2
EST. GFR  (NON AFRICAN AMERICAN): 40.3 ML/MIN/1.73 M^2
EST. GFR  (NON AFRICAN AMERICAN): 40.3 ML/MIN/1.73 M^2
FLOW: 4
FRACTIONAL SHORTENING: 14 % (ref 28–44)
GAMMA INTERFERON BACKGROUND BLD IA-ACNC: 0.03 IU/ML
GLUCOSE SERPL-MCNC: 103 MG/DL (ref 70–110)
GLUCOSE SERPL-MCNC: 140 MG/DL (ref 70–110)
HCO3 UR-SCNC: 31.7 MMOL/L (ref 24–28)
HCO3 UR-SCNC: 32.7 MMOL/L (ref 24–28)
HCT VFR BLD AUTO: 38.2 % (ref 37–48.5)
HGB BLD-MCNC: 11 G/DL (ref 12–16)
IMM GRANULOCYTES # BLD AUTO: 0.01 K/UL (ref 0–0.04)
IMM GRANULOCYTES NFR BLD AUTO: 0.2 % (ref 0–0.5)
INR PPP: 1.9 (ref 0.8–1.2)
INTERVENTRICULAR SEPTUM: 0.79 CM (ref 0.6–1.1)
LA MAJOR: 3.66 CM
LA WIDTH: 2.52 CM
LDH SERPL L TO P-CCNC: 205 U/L (ref 110–260)
LEFT ATRIUM SIZE: 2.2 CM
LEFT INTERNAL DIMENSION IN SYSTOLE: 3.86 CM (ref 2.1–4)
LEFT VENTRICLE DIASTOLIC VOLUME INDEX: 58.97 ML/M2
LEFT VENTRICLE DIASTOLIC VOLUME: 125.12 ML
LEFT VENTRICLE MASS INDEX: 53 G/M2
LEFT VENTRICLE SYSTOLIC VOLUME INDEX: 30.3 ML/M2
LEFT VENTRICLE SYSTOLIC VOLUME: 64.37 ML
LEFT VENTRICULAR INTERNAL DIMENSION IN DIASTOLE: 4.5 CM (ref 3.5–6)
LEFT VENTRICULAR MASS: 111.78 G
LYMPHOCYTES # BLD AUTO: 0.9 K/UL (ref 1–4.8)
LYMPHOCYTES NFR BLD: 16.6 % (ref 18–48)
M TB IFN-G CD4+ BCKGRND COR BLD-ACNC: 0 IU/ML
MAGNESIUM SERPL-MCNC: 2.2 MG/DL (ref 1.6–2.6)
MAGNESIUM SERPL-MCNC: 2.2 MG/DL (ref 1.6–2.6)
MCH RBC QN AUTO: 24.8 PG (ref 27–31)
MCHC RBC AUTO-ENTMCNC: 28.8 G/DL (ref 32–36)
MCV RBC AUTO: 86 FL (ref 82–98)
METHEMOGLOBIN: 0.3 % (ref 0–3)
MITOGEN IGNF BCKGRD COR BLD-ACNC: 1.78 IU/ML
MODE: ABNORMAL
MONOCYTES # BLD AUTO: 0.4 K/UL (ref 0.3–1)
MONOCYTES NFR BLD: 6.9 % (ref 4–15)
MV PEAK A VEL: 0.72 M/S
MV PEAK E VEL: 1.05 M/S
NEUTROPHILS # BLD AUTO: 3.8 K/UL (ref 1.8–7.7)
NEUTROPHILS NFR BLD: 71.6 % (ref 38–73)
NRBC BLD-RTO: 0 /100 WBC
PCO2 BLDA: 52.4 MMHG (ref 35–45)
PCO2 BLDA: 56.7 MMHG (ref 35–45)
PH SMN: 7.37 [PH] (ref 7.35–7.45)
PH SMN: 7.39 [PH] (ref 7.35–7.45)
PHOSPHATE SERPL-MCNC: 4.3 MG/DL (ref 2.7–4.5)
PISA TR MAX VEL: 1.71 M/S
PLATELET # BLD AUTO: 309 K/UL (ref 150–350)
PMV BLD AUTO: 9.7 FL (ref 9.2–12.9)
PO2 BLDA: 31 MMHG (ref 40–60)
PO2 BLDA: 32 MMHG (ref 40–60)
POC BE: 7 MMOL/L
POC BE: 7 MMOL/L
POC SATURATED O2: 57 % (ref 95–100)
POC SATURATED O2: 58 % (ref 95–100)
POC TCO2: 33 MMOL/L (ref 24–29)
POC TCO2: 34 MMOL/L (ref 24–29)
POTASSIUM SERPL-SCNC: 3.7 MMOL/L (ref 3.5–5.1)
POTASSIUM SERPL-SCNC: 4 MMOL/L (ref 3.5–5.1)
PREALB SERPL-MCNC: 29 MG/DL (ref 20–43)
PROT SERPL-MCNC: 7 G/DL (ref 6–8.4)
PROTHROMBIN TIME: 18.1 SEC (ref 9–12.5)
RA MAJOR: 4.43 CM
RA PRESSURE: 8 MMHG
RA WIDTH: 3.31 CM
RBC # BLD AUTO: 4.44 M/UL (ref 4–5.4)
RIGHT VENTRICULAR END-DIASTOLIC DIMENSION: 3.97 CM
SAMPLE: ABNORMAL
SAMPLE: ABNORMAL
SINUS: 3.02 CM
SITE: ABNORMAL
SITE: ABNORMAL
SODIUM SERPL-SCNC: 141 MMOL/L (ref 136–145)
SODIUM SERPL-SCNC: 141 MMOL/L (ref 136–145)
SP02: 98
STJ: 3.01 CM
TB GOLD PLUS: NEGATIVE
TB2 - NIL: 0 IU/ML
TR MAX PG: 12 MMHG
TRICUSPID ANNULAR PLANE SYSTOLIC EXCURSION: 1.18 CM
TV REST PULMONARY ARTERY PRESSURE: 20 MMHG
WBC # BLD AUTO: 5.24 K/UL (ref 3.9–12.7)

## 2019-11-25 PROCEDURE — 25000003 PHARM REV CODE 250: Mod: NTX | Performed by: INTERNAL MEDICINE

## 2019-11-25 PROCEDURE — 84100 ASSAY OF PHOSPHORUS: CPT | Mod: NTX

## 2019-11-25 PROCEDURE — 99292 PR CRITICAL CARE, ADDL 30 MIN: ICD-10-PCS | Mod: NTX,,, | Performed by: INTERNAL MEDICINE

## 2019-11-25 PROCEDURE — 85730 THROMBOPLASTIN TIME PARTIAL: CPT | Mod: NTX

## 2019-11-25 PROCEDURE — 85025 COMPLETE CBC W/AUTO DIFF WBC: CPT | Mod: NTX

## 2019-11-25 PROCEDURE — 99223 PR INITIAL HOSPITAL CARE,LEVL III: ICD-10-PCS | Mod: NTX,,, | Performed by: INTERNAL MEDICINE

## 2019-11-25 PROCEDURE — 83615 LACTATE (LD) (LDH) ENZYME: CPT | Mod: NTX

## 2019-11-25 PROCEDURE — 27000221 HC OXYGEN, UP TO 24 HOURS: Mod: NTX

## 2019-11-25 PROCEDURE — 25000003 PHARM REV CODE 250: Mod: NTX | Performed by: STUDENT IN AN ORGANIZED HEALTH CARE EDUCATION/TRAINING PROGRAM

## 2019-11-25 PROCEDURE — 20000000 HC ICU ROOM: Mod: NTX

## 2019-11-25 PROCEDURE — 94761 N-INVAS EAR/PLS OXIMETRY MLT: CPT | Mod: NTX

## 2019-11-25 PROCEDURE — 99291 PR CRITICAL CARE, E/M 30-74 MINUTES: ICD-10-PCS | Mod: NTX,,, | Performed by: PHYSICIAN ASSISTANT

## 2019-11-25 PROCEDURE — 99292 CRITICAL CARE ADDL 30 MIN: CPT | Mod: NTX,,, | Performed by: INTERNAL MEDICINE

## 2019-11-25 PROCEDURE — 27000248 HC VAD-ADDITIONAL DAY: Mod: NTX

## 2019-11-25 PROCEDURE — 80500 PR  LAB PATHOLOGY CONSULT-LTD: CPT | Mod: NTX,,, | Performed by: PATHOLOGY

## 2019-11-25 PROCEDURE — 99291 CRITICAL CARE FIRST HOUR: CPT | Mod: NTX,,, | Performed by: PHYSICIAN ASSISTANT

## 2019-11-25 PROCEDURE — 83880 ASSAY OF NATRIURETIC PEPTIDE: CPT | Mod: NTX

## 2019-11-25 PROCEDURE — 25000003 PHARM REV CODE 250: Mod: NTX | Performed by: PHYSICIAN ASSISTANT

## 2019-11-25 PROCEDURE — 63600367 HC NITRIC OXIDE PER HOUR: Mod: NTX

## 2019-11-25 PROCEDURE — 83735 ASSAY OF MAGNESIUM: CPT | Mod: NTX

## 2019-11-25 PROCEDURE — 80500 PR  LAB PATHOLOGY CONSULT-LTD: ICD-10-PCS | Mod: NTX,,, | Performed by: PATHOLOGY

## 2019-11-25 PROCEDURE — 85610 PROTHROMBIN TIME: CPT | Mod: NTX

## 2019-11-25 PROCEDURE — 93750 PR INTERROGATE VENT ASSIST DEV, IN PERSON, W PHYSICIAN ANALYSIS: ICD-10-PCS | Mod: NTX,,, | Performed by: INTERNAL MEDICINE

## 2019-11-25 PROCEDURE — 99900035 HC TECH TIME PER 15 MIN (STAT): Mod: NTX

## 2019-11-25 PROCEDURE — 63600175 PHARM REV CODE 636 W HCPCS: Mod: NTX | Performed by: PHYSICIAN ASSISTANT

## 2019-11-25 PROCEDURE — 80076 HEPATIC FUNCTION PANEL: CPT | Mod: NTX

## 2019-11-25 PROCEDURE — 93750 INTERROGATION VAD IN PERSON: CPT | Mod: NTX,,, | Performed by: INTERNAL MEDICINE

## 2019-11-25 PROCEDURE — 80048 BASIC METABOLIC PNL TOTAL CA: CPT | Mod: 91,NTX

## 2019-11-25 PROCEDURE — 84134 ASSAY OF PREALBUMIN: CPT | Mod: NTX

## 2019-11-25 PROCEDURE — 99223 1ST HOSP IP/OBS HIGH 75: CPT | Mod: NTX,,, | Performed by: INTERNAL MEDICINE

## 2019-11-25 PROCEDURE — 86140 C-REACTIVE PROTEIN: CPT | Mod: NTX

## 2019-11-25 RX ORDER — GLYCERIN 1 G/1
1 SUPPOSITORY RECTAL ONCE
Status: COMPLETED | OUTPATIENT
Start: 2019-11-25 | End: 2019-11-25

## 2019-11-25 RX ORDER — POTASSIUM CHLORIDE 20 MEQ/1
40 TABLET, EXTENDED RELEASE ORAL ONCE
Status: COMPLETED | OUTPATIENT
Start: 2019-11-25 | End: 2019-11-25

## 2019-11-25 RX ORDER — AMOXICILLIN 250 MG
1 CAPSULE ORAL ONCE
Status: COMPLETED | OUTPATIENT
Start: 2019-11-25 | End: 2019-11-25

## 2019-11-25 RX ORDER — AMOXICILLIN 250 MG
1 CAPSULE ORAL 2 TIMES DAILY
Status: DISCONTINUED | OUTPATIENT
Start: 2019-11-25 | End: 2019-12-03

## 2019-11-25 RX ORDER — POLYETHYLENE GLYCOL 3350 17 G/17G
17 POWDER, FOR SOLUTION ORAL DAILY
Status: DISCONTINUED | OUTPATIENT
Start: 2019-11-25 | End: 2019-12-03

## 2019-11-25 RX ADMIN — FUROSEMIDE 80 MG: 10 INJECTION, SOLUTION INTRAMUSCULAR; INTRAVENOUS at 05:11

## 2019-11-25 RX ADMIN — LISINOPRIL 10 MG: 10 TABLET ORAL at 09:11

## 2019-11-25 RX ADMIN — FUROSEMIDE 80 MG: 10 INJECTION, SOLUTION INTRAMUSCULAR; INTRAVENOUS at 09:11

## 2019-11-25 RX ADMIN — ASPIRIN 325 MG: 325 TABLET, DELAYED RELEASE ORAL at 09:11

## 2019-11-25 RX ADMIN — MAGNESIUM OXIDE TAB 400 MG (241.3 MG ELEMENTAL MG) 400 MG: 400 (241.3 MG) TAB at 09:11

## 2019-11-25 RX ADMIN — MEXILETINE HYDROCHLORIDE 150 MG: 150 CAPSULE ORAL at 02:11

## 2019-11-25 RX ADMIN — SPIRONOLACTONE 12.5 MG: 25 TABLET, FILM COATED ORAL at 09:11

## 2019-11-25 RX ADMIN — GLYCERIN 1 SUPPOSITORY: 2 SUPPOSITORY RECTAL at 05:11

## 2019-11-25 RX ADMIN — PANTOPRAZOLE SODIUM 40 MG: 40 TABLET, DELAYED RELEASE ORAL at 09:11

## 2019-11-25 RX ADMIN — WARFARIN SODIUM 3 MG: 2 TABLET ORAL at 05:11

## 2019-11-25 RX ADMIN — MEXILETINE HYDROCHLORIDE 150 MG: 150 CAPSULE ORAL at 06:11

## 2019-11-25 RX ADMIN — AMIODARONE HYDROCHLORIDE 1 MG/MIN: 1.8 INJECTION, SOLUTION INTRAVENOUS at 05:11

## 2019-11-25 RX ADMIN — POLYETHYLENE GLYCOL 3350 17 G: 17 POWDER, FOR SOLUTION ORAL at 09:11

## 2019-11-25 RX ADMIN — SENNOSIDES AND DOCUSATE SODIUM 1 TABLET: 8.6; 5 TABLET ORAL at 05:11

## 2019-11-25 RX ADMIN — AMIODARONE HYDROCHLORIDE 400 MG: 200 TABLET ORAL at 09:11

## 2019-11-25 RX ADMIN — AMIODARONE HYDROCHLORIDE 150 MG: 1.5 INJECTION, SOLUTION INTRAVENOUS at 12:11

## 2019-11-25 RX ADMIN — VENLAFAXINE 150 MG: 37.5 TABLET ORAL at 09:11

## 2019-11-25 RX ADMIN — MIRTAZAPINE 15 MG: 15 TABLET, FILM COATED ORAL at 09:11

## 2019-11-25 RX ADMIN — ALPRAZOLAM 0.25 MG: 0.25 TABLET ORAL at 11:11

## 2019-11-25 RX ADMIN — MEXILETINE HYDROCHLORIDE 150 MG: 150 CAPSULE ORAL at 10:11

## 2019-11-25 RX ADMIN — ALUMINUM HYDROXIDE, MAGNESIUM HYDROXIDE, AND SIMETHICONE 50 ML: 200; 200; 20 SUSPENSION ORAL at 12:11

## 2019-11-25 RX ADMIN — AMIODARONE HYDROCHLORIDE 1 MG/MIN: 1.8 INJECTION, SOLUTION INTRAVENOUS at 12:11

## 2019-11-25 RX ADMIN — AMIODARONE HYDROCHLORIDE 0.5 MG/MIN: 1.8 INJECTION, SOLUTION INTRAVENOUS at 10:11

## 2019-11-25 RX ADMIN — ZOLPIDEM TARTRATE 5 MG: 5 TABLET ORAL at 10:11

## 2019-11-25 RX ADMIN — GABAPENTIN 200 MG: 100 CAPSULE ORAL at 09:11

## 2019-11-25 RX ADMIN — POTASSIUM CHLORIDE 40 MEQ: 1500 TABLET, EXTENDED RELEASE ORAL at 02:11

## 2019-11-25 RX ADMIN — DOCUSATE SODIUM 50 MG: 50 CAPSULE, LIQUID FILLED ORAL at 09:11

## 2019-11-25 RX ADMIN — SENNOSIDES AND DOCUSATE SODIUM 1 TABLET: 8.6; 5 TABLET ORAL at 10:11

## 2019-11-25 NOTE — PLAN OF CARE
VS and assessment per flow sheet, see below for updates:     Pulmonary: Pt on 4L NC and Nitric weaned to 5ppm. Pulse ox q4, O2 sats %. Lung sounds clear. AM SVO2 57.     Cardiovascular: Pt has Hm3 in place - speed set at 5300. No VAD alarms overnight. Pt is 100% paced, HR 80s-90s. Doppplers 80, 78, 80. Pt is intermittently pulsatile. CVP 8, 8, 9.     Neurological: Pt AAOx4, stand by assist, afebrile. No overt deficits noted.      Gastrointestinal: Last BM 11/23. Bowel sounds audible and active.      Genitourinary: Pt voids spontaneously in bedside commode. UO adequate.      Endocrine: n/a.     Skin/Bath: Skin intact. VAD dressing in place and CDI.                      Date of last CHG bath given: 11/24 on AM shift.      Infusions: Pt on epi at 0.02.     CMICU DAILY GOALS       A: Awake    RASS: Goal - RASS Goal: 0-->alert and calm  Actual - RASS (Patel Agitation-Sedation Scale): 0-->alert and calm   Restraint necessity:    B: Breath   SBT: Not intubated   C: Coordinate A & B, analgesics/sedatives   Pain: managed    SAT: Not intubated  D: Delirium   CAM-ICU: Overall CAM-ICU: Negative  E: Early Mobility   MOVE Screen: Pass   Activity: Activity Management: activity adjusted per tolerance, activity clustered for rest period  FAS: Feeding/Nutrition   Diet order: Diet/Nutrition Received: 2 gram sodium,   Fluid restriction: Fluid Requirement: 1500cc FR  T: Thrombus   DVT prophylaxis: VTE Required Core Measure: Pharmacological prophylaxis initiated/maintained  H: HOB Elevation   Head of Bed (HOB): HOB at 30-45 degrees  U: Ulcer Prophylaxis   GI: yes  G: Glucose control   managed    S: Skin   Bundle compliance: yes   Bathing/Skin Care: back care, bath, chlorhexidine, bath, complete, linen changed Date: 11/24 on AM shift  B: Bowel Function   constipation   I: Indwelling Catheters   Fonseca necessity:   no   CVC necessity: Yes   IPAD offered: No  D: De-escalation Antibx   Pt not receiving abx.   Plan for the day   Plan  for the day is to wean support and monitor CVP/Doppler/SVO2, also no VAD alarms/VT episodes.   Family/Goals of care/Code Status   Code Status: Full Code     No acute events throughout day, VS and assessment per flow sheet, patient progressing towards goals as tolerated, plan of care reviewed with Deborah Navas and family, all concerns addressed, will continue to monitor.

## 2019-11-25 NOTE — ASSESSMENT & PLAN NOTE
- Per EP office visit on day of admission: She continues to have multiple VT episodes with some ATP therapy, although no ICD shocks since starting mexiletine 10/24/2019. One slow VT episode 2.5hrs 11/3/2019. Patient asymptomatic with LVAD. On coumadin. No AFL since post-op event (paced out of rhythm 9/24/2019). CHB with 100% V pacing (LV lead off).   - Plan was to continue current regimen despite Mexiletine making her nauseous.  - Decreased Amiodarone to daily per EP plan on discharge last hospitalization   - re consulted EP today for ICD shocks in setting of VT   - stat labs and echo, stat ICD interrogation. Re bolus Amio per EP.

## 2019-11-25 NOTE — NURSING
Outpatient Physical Therapy  DAILY TREATMENT     [facility] Outpatient Physical Therapy  [address]  [place] [address]  Phone:  [phone]  Fax:  [phone]    Date: 10/10/2018    Patient: Cesar Bernard  YOB: 1958  MRN: [id number]     Time Calculation  Start time: 1135  Stop time: 1220 Time Calculation (min): 45 minutes     Chief Complaint: Neck Pain    Visit #: 3    SUBJECTIVE:  Overall, feels like symptoms are less irritable, but continues to have R side neck pain with pain radiating to shoulder when looking down for extended periods (e.g. Cooking, chopping food). He washed all the windows in his house today (outside) without any provocation of symptoms.    OBJECTIVE:  Current objective measures: Tenderness to palpation suboccipitals R>L, mild R side neck pain with Cervical PROM R at end range.     C2-7 PA glides and bilateral downglides WFL and no symptom provocation.       Therapeutic Exercises (CPT 38690):     1. UBE 2 min fwd, 2 min bckwd    2. Supine chin tuck with cervical/axial flexion, 5 x 2 with exhalation with initiation of action    3. Unilateral horizontal UE abduction, [place] band, 10 ea. side x 1    4. Prone on ball, Rows 15 x 2    5. Quadruped, with cervical retraction, 1 min hold x 2      Therapeutic Exercise Summary:         Therapeutic Treatments and Modalities:     1. Manual Therapy (CPT 87380), Suboccipital release, PROM ROT, 8 min    2. Mechanical Traction (CPT 04205), 60/20, 17/8, with moist heat , 15 min    Time-based treatments/modalities:  Manual therapy minutes (CPT 38411): 15 minutes  Therapeutic exercise minutes (CPT 71738): 15 minutes       Pain rating before treatment: 0  Pain rating after treatment: 0    ASSESSMENT:   Response to treatment: Difficulty performing cervical/axial flexion without engaging superficial musculature, improved with cueing for breathing pattern. Is able to maintain cervical retraction in quadruped for up to  Called to notify team that patient did not have BM with suppository. Team to place orders.   on min but requires tactile verbal cueing. Progressing well with the HEP.     PLAN/RECOMMENDATIONS:   Plan for treatment: therapy treatment to continue next visit.  Planned interventions for next visit: continue with current treatment. Continue with ball exercises.

## 2019-11-25 NOTE — ASSESSMENT & PLAN NOTE
-HeartMate 3 Implanted 9/10/19 as DT.  -Implanted by Dr. Walden  -INR sub therapeutic. Goal INR 2.0-3.0.Boost Coumadin.   - Previous home dose was 3mg on Wed and 1.5mg QDaily  -Antiplatelets:  mg.  -LDH is stable Will monitor daily.   -Speed set at 5300, had another asymptomatic LFA on 11/21  -Not listed for OHTx: was declined due to pulmonary hypertension and incomplete care giving plan. Patient now has care giving plan. 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    Procedure: Device Interrogation Including analysis of device parameters  Current Settings: Ventricular Assist Device  Review of device function is stable    TXP LVAD INTERROGATIONS 11/25/2019 11/25/2019 11/25/2019 11/25/2019 11/25/2019 11/25/2019 11/25/2019   Type HeartMate II;HeartMate3 HeartMate3 HeartMate3 - HeartMate3 HeartMate3 HeartMate3   Flow 3.7 4.0 4.0 4.0 4.1 4.2 4.0   Speed 5300 5300 5300 5300 5300 5350 5300   PI 3.2 3.8 3.7 3.5 3.5 3.9 3.9   Power (Orellana) 3.7 3.7 3.7 3.7 3.7 3.8 3.7   LSL 4900 4900 4900 4900 4900 4900 4900   Pulsatility Intermittent pulse Intermittent pulse Intermittent pulse Intermittent pulse Intermittent pulse Intermittent pulse Intermittent pulse

## 2019-11-25 NOTE — PROCEDURES
EP PROCEDURE NOTE    MEDTRONIC CRT-D    Parameters  LV lead off  RV pacing 99.6%  VVI 40 Test-> HR 40 (CHB)  DDD 60    VF On  Initial 30/40 Redetect 12/16 270 ms (222 bpm) ATP before charging, 35 J x6  FVT via  (171 ms) Ramp (2), 25 J, 35 J x 4  VT On Initial 24 Redetect 12 410 ms (146 bpm)  Burst (3) Ramp (3) Burst (3), Ramp (2) x 3    Monitor Initial 32 450 ms (133 bpm)    Treated Episodes  ATP 15 times since 11/21/19 (152 -167 bpm)    ICD Shocks  Episode 1: 11:30 AM 11/25/19 A/V 95/162  31 Sec Duration  MMVT 370 ms CL -> FVT Rx x 1 Ramp -> MMVT 270-280 ms CL  -> FVT Rx 1/Seq 2 -> MMVT 280 ms -> FVT Rx 2 CV (25 J) -> Terminated    Episode 2: 11:49 AM 11/25/19 A/V 94/176  41 Sec duration  MMVT 340ms -> FVT Rx x 1 Ramp -> Accelerated to 250 ms -260 ms VF zone -> VF Rx 1 Burst before charge -> still in VF -> VF Rx1 Burst during charge ->  290 ms and accelerate back to 250 ms VF zone -> 36.6 J CV (1st shock) ->   -230 ms -> Rx 36.6 J (2nd shock) -> Terminated.    Jonathan Santiago MD (Sourav)  Cardiology Fellow  PGY 5  [facility]

## 2019-11-25 NOTE — ASSESSMENT & PLAN NOTE
-hx low flow alarms prior to admit, last LFA 11/21  -Possibly secondary to ADHF.  -Echo shows IVS shifting to right side and LVED increased to 6.4.    -Formal report of CT unremarkable; however, it was a non contrast as creat was elevated above baseline on admit and inflow and outflow cannulas not accurately visualized

## 2019-11-25 NOTE — NURSING
Patient with need to void. Had her sitting on edge of bed to acclimate her before standing up to commode. When moving from supine to sitting felt dizzy and anxious. Had patient take slow deep breaths. After about 2 minutes at EOB patient with Vtach, approx 25 beats, which she was paced out of. Called and notified Jono FRANK.     Patient repositioned in bed and provided bed pan.     Received return phone call from PA with new order for LVAD Speed to be decreased to 5200. Speed decreased and low speed alarm changed. Patient informed. PA also notified that PI increased to almost 6 while in Vtach. PA to call and speak with LVAD coordinator.

## 2019-11-25 NOTE — ASSESSMENT & PLAN NOTE
-NICM  -Last 2D Echo 11/13/19: LVEF 20%, LVEDD 6.3 cm with speed at 5300 and severe MR  -Repeat echo today pending  -lasix 80 IV BID currently   - now on EPI .02 and NO @ 5 (increased back to 10 after ICD shocks). Svo2 57/58 from 60-70's with drop in NO from 10-->5   - CVP 9 this AM  - GDMT: Continue ACE 10 BID, aldactone 12.5   - was on lasix 20 PRN at home PTA  -2g Na dietary restriction, 1500 mL fluid restriction, strict I/Os

## 2019-11-25 NOTE — PROGRESS NOTES
[facility] TRANSFUSION MEDICINE  Section of Transfusion Medicine and Histocompatibility  HLA Note    Case Details   Diagnosis:  No primary diagnosis found.  Blood Type: O POS  HLA Type:   Class I:  Lab Results   Component Value Date    RDVF7KX 1 09/06/2019    CNVN5ED 32 09/06/2019    FKBG6YV 8 09/06/2019    KHBY9QH 41 09/06/2019    TQKMM5QY 6 09/06/2019    RTVPN6JB XX 09/06/2019    AVRAX5WZ 7 09/06/2019    LHFNS0JX 17 09/06/2019     Class II:  Lab Results   Component Value Date    GGVXGG17KJ 17 09/06/2019    FSTUXT84CL 7 09/06/2019    PWKWLU532LG 52 09/06/2019    DUQVRY3531 53 09/06/2019    LJXZK8TA 2 09/06/2019    RPIAR1HW XX 09/06/2019     Recent Antibody Screen/ID Results:   Lab Results   Component Value Date    FT0AZZO Negative 11/20/2019    CIABCLM WEAK---B46(1535) 09/06/2019    CIIAB Negative 11/20/2019     Auto T Cell Crossmatch Results:  Lab Results   Component Value Date    XMTCELLRES Negative 09/06/2019     Auto B Cell Crossmatch Results:  Lab Results   Component Value Date    BCELLRES Negative 09/06/2019     Assessment     Interpretation: There are no HLA antibodies near or above the cut-off. A virtual crossmatch is recommended.    Strongly Recommended Unacceptable Antigens: None    Crossmatch Expectations: (Given strongly recommended unacceptable antigens) A retrospective or prospective flow cytometric crossmatch is expected to be negative.    Please call the HLA Lab [phone] with any concerns or questions.    ANNE MARIE Diego MD, VANDA  Section of Transfusion Medicine & Histocompatibility  Department of Pathology and Laboratory Medicine  [facility]  11/25/2019

## 2019-11-25 NOTE — NURSING
Patient back into V-Tach sustained approx 30sec and patient shocked twice before she was paced out of rhythm.

## 2019-11-25 NOTE — PROGRESS NOTES
11/25/2019  Trevin Sow    Current provider:  Renetta Chaudhari MD      I, Trevin Sow, rounded on Deborah Oliva Navas to ensure all mechanical assist device settings (IABP or VAD) were appropriate and all parameters were within limits.  I was able to ensure all back up equipment was present, the staff had no issues, and the Perfusion Department daily rounding was complete.    6:54 AM

## 2019-11-25 NOTE — PLAN OF CARE
VS and assessment per flow sheet, see below for updates:    Pulmonary: Patient remains stable on 4L NC with 10ppm NO (increased following VT events). Saturating well, no cough, no SOB    Cardiovascular: HR Paced, 70-90s. Multiple VT events today. See notes. Patient loaded with amio and continues on amio @1. EP interrogated device and has given recs for more VT events. VAD speed decreased to 5200 following 4th run of VT.     Neurological: Pt AAOx4, appropriately anxious- improves with xanax. OOB x1 today which precipitated initial VT.     Gastrointestinal: Good PO intake. No BM with bowel regimen so suppository given.    Genitourinary: Pt with good UOP    Endocrine: Montioring electrolytes, midday K+ 3.7, 40meq PO replaced    Skin/Bath: Skin intact, VAD dressing to be changed today Date of last CHG bath given: 11/25/19    Infusions: amio, epi    Patient progressing towards goals as tolerated, plan of care communicated and reviewed with Deborah Navas and family, all concerns addressed, will continue to monitor.     Consuelo Becerril RN

## 2019-11-25 NOTE — SUBJECTIVE & OBJECTIVE
Interval History: Continues to have NSVT. Otherwise feels well this AM.  Shocked x 3 this AM (around 11:30) for VT. EP re-consulted- spoke with Dr Alves over the phone who stated to place consult and re bolus with Amio.   Will likely try for exception and increase in status for transplant due to RV failure, VT and ICD shocks.     Continuous Infusions:   sodium chloride 0.9%      amiodarone in dextrose 5% 1 mg/min (11/25/19 1218)    amiodarone in dextrose 5%      epinephrine 0.02 mcg/kg/min (11/25/19 1100)    nitric oxide gas       Scheduled Meds:   aspirin  325 mg Oral Daily    docusate sodium  50 mg Oral Daily    furosemide  80 mg Intravenous BID    gabapentin  200 mg Oral BID    hepatitis A virus vaccine (PF)  1,440 Units Intramuscular Once    lisinopril  10 mg Oral BID    magnesium oxide  400 mg Oral Daily    mexiletine  150 mg Oral Q8H    mirtazapine  15 mg Oral QHS    pantoprazole  40 mg Oral Daily    polyethylene glycol  17 g Oral Daily    spironolactone  12.5 mg Oral Daily    venlafaxine  150 mg Oral Daily    warfarin  3 mg Oral Daily     PRN Meds:sodium chloride 0.9%, ALPRAZolam, pneumoc 13-amber conj-dip cr(PF), promethazine, senna-docusate 8.6-50 mg, zolpidem    Review of patient's allergies indicates:   Allergen Reactions    Adhesive Blisters     Reaction to area in chest and up only    Codeine Itching     Objective:     Vital Signs (Most Recent):  Temp: 98 °F (36.7 °C) (11/25/19 1100)  Pulse: (!) 184 (11/25/19 1207)  Resp: (!) 23 (11/25/19 1207)  BP: (!) 62/0 (11/25/19 1200)  SpO2: 100 % (11/25/19 0800) Vital Signs (24h Range):  Temp:  [97.8 °F (36.6 °C)-98.4 °F (36.9 °C)] 98 °F (36.7 °C)  Pulse:  [] 184  Resp:  [15-43] 23  SpO2:  [97 %-100 %] 100 %  BP: (62-82)/(0) 62/0     Patient Vitals for the past 72 hrs (Last 3 readings):   Weight   11/25/19 1100 101.7 kg (224 lb 3.3 oz)   11/25/19 0500 101.7 kg (224 lb 3.3 oz)   11/24/19 0500 101.9 kg (224 lb 10.4 oz)     Body mass index  is 35.12 kg/m².      Intake/Output Summary (Last 24 hours) at 11/25/2019 1229  Last data filed at 11/25/2019 1213  Gross per 24 hour   Intake 843 ml   Output 2350 ml   Net -1507 ml       Hemodynamic Parameters:  CVP:  [8 mmHg] 8 mmHg      Physical Exam   Constitutional: She is oriented to person, place, and time. She appears well-developed and well-nourished.   HENT:   Mouth/Throat: Oropharynx is clear and moist.   Eyes: EOM are normal.   Neck: No JVD present.   Cardiovascular: Normal rate, regular rhythm and intact distal pulses.   Smooth VAD hum   Pulmonary/Chest: Effort normal and breath sounds normal. No respiratory distress. She has no rales.   Abdominal: Soft. Bowel sounds are normal. She exhibits no distension. There is no tenderness. There is no guarding.   Musculoskeletal: She exhibits no edema.   Neurological: She is alert and oriented to person, place, and time.   Skin: Skin is warm.   Psychiatric: She has a normal mood and affect.   Nursing note and vitals reviewed.      Significant Labs:  CBC:  Recent Labs   Lab 11/23/19  0403 11/24/19  0356 11/25/19  0357   WBC 5.01 5.27 5.24   RBC 4.23 4.31 4.44   HGB 10.6* 10.5* 11.0*   HCT 36.1* 37.1 38.2    297 309   MCV 85 86 86   MCH 25.1* 24.4* 24.8*   MCHC 29.4* 28.3* 28.8*     BNP:  Recent Labs   Lab 11/20/19  0606 11/22/19  0349 11/25/19  0357   * 176* 192*     CMP:  Recent Labs   Lab 11/20/19  0606  11/22/19  0349  11/24/19  0356 11/25/19  0357 11/25/19  1145   *   < > 132*   < > 127* 140* 103   CALCIUM 9.8   < > 9.3   < > 9.1 9.7 9.8   ALBUMIN 3.8  --  3.3*  --   --  3.5  --    PROT 7.9  --  6.9  --   --  7.0  --       < > 141   < > 140 141 141   K 3.5   < > 4.0   < > 4.2 4.0 3.7   CO2 25   < > 29   < > 30* 29 32*      < > 101   < > 101 100 99   BUN 31*   < > 37*   < > 33* 31* 29*   CREATININE 1.7*   < > 2.0*   < > 1.6* 1.5* 1.5*   ALKPHOS 97  --  92  --   --  90  --    ALT 15  --  18  --   --  15  --    AST 22  --  21  --    --  19  --    BILITOT 0.5  --  0.5  --   --  0.5  --     < > = values in this interval not displayed.      Coagulation:   Recent Labs   Lab 11/23/19 0403 11/24/19 0356 11/25/19 0357   INR 2.1* 2.1* 1.9*   APTT 33.9* 33.1* 31.3     LDH:  Recent Labs   Lab 11/23/19 0403 11/24/19 0356 11/25/19 0357    208 205     Microbiology:  Microbiology Results (last 7 days)     ** No results found for the last 168 hours. **          I have reviewed all pertinent labs within the past 24 hours.    Estimated Creatinine Clearance: 55 mL/min (A) (based on SCr of 1.5 mg/dL (H)).    Diagnostic Results:  I have reviewed and interpreted all pertinent imaging results/findings within the past 24 hours.

## 2019-11-25 NOTE — ASSESSMENT & PLAN NOTE
- will attempt to list with exemption due to VT with ICD shocks and RV failure (will not tolerate )

## 2019-11-25 NOTE — NURSING
"Patient up to commode. Bedside alarm started alarming V-Tach and immediately after patient screamed "Ow, I just got shocked." Patient able to get back to bed, no LOC, did urinate on self. ZAC De La Cruz notified. Labs ordered and EP to come interrogate AICD. Linen changed and patient assisted in cleaning up.   "

## 2019-11-25 NOTE — TELEPHONE ENCOUNTER
Cardiac device interrogation and/or reprogramming completed by industry representative, Cari THURSTONT); please refer to report located in the Cards Procedure tab.

## 2019-11-25 NOTE — SIGNIFICANT EVENT
Patient with ongoing VT storm though has quieted down with amio ggt.   VT with ATP x1 episode while up to edge pf bed since amio has been initiated.  Will drop speed to 5200 after reviewing echo (LVEDD 4.5). Will review imaging with Dr Bryant but drop speed by 100 rpm to 5200 in the interim and repeat echo soon.  Will follow EP recommendations and add lidocaine ggt (and d/c mexil) if patient continues to have VT. Can also consider sedation.  Plan discussed with Dr Bryant.     Julieth Ramos PA-C

## 2019-11-25 NOTE — NURSING
Notified MD that vasopressin was turned off and pt's currently on 0.38 of levophed. During bath, pt began to open eyes and move all extremities spontaneously, but could not follow any commands. KIM.

## 2019-11-25 NOTE — NURSING
Notified MD Long of AM SVO2 of 57 and UO of 1.3L overnight. No new orders received at this time. TM.

## 2019-11-25 NOTE — CONSULTS
[facility]  Cardiac Electrophysiology  Consult Note    Admission Date: 11/13/2019  Code Status: Full Code   Attending Provider: Renetta Chaudhari MD  Consulting Provider: Jonathan Manriquez MD  Principal Problem:Left ventricular assist device (LVAD) complication    Inpatient consult to Electrophysiology  Consult performed by: Jonathan Manriquez MD  Consult ordered by: Julieth Ramos PA-C        Subjective:     Chief Complaint:  Ventricular Tachycardia     HPI:   49 y F with PMH AF/AFL, NICM s/p BiV ICD 2017 Med[employer]onic, CRT-D upgrade 2/19,  LVAD HM3 9/19, VT/VF on Amio, Mexelitine (started 10/30/19) last shock about a month ago. LV lead off post LVAD. She presented 11/13 with volume overload and RV failure via HTS clinic, she was started on Epi gtt and Nitric oxide 11/21. She has been having NS-VT with some ATP's since admission but today suffered 3 ICD shocks in the space of 20 minsutes. She was resting in bed and suddenly felt palpitations followed by a shock. She denies chest pain (just sore), syncope etc. She is hemodynamically stable. K 3.7, Mg 2.2. Device interrogated and revealed monormorphic VT slightly accelerated by ATP (162 bpm) and shocked x1 with termination followed by monomorphic  VT accelerated to FVT/VF range ( bpm) and shocked x 2 (1st unsuccessful remained in VF range) with termination.    Past Medical History:   Diagnosis Date    Fractures     History of ventricular fibrillation 9/4/2019    History of ventricular tachycardia 9/4/2019    Hyperlipidemia     Migraine     Osteoarthritis        Past Surgical History:   Procedure Laterality Date    BACK SURGERY      2007    BONE GRAFT Left     from Left hip to Left FA    eardrum reconstruction  1980    ELBOW SURGERY Left [id number]    FOREARM FRACTURE SURGERY Bilateral [id number]    multiple surgeries    INSERTION OF GRAFT TO PERICARDIUM  9/10/2019    Procedure: INSERTION, GRAFT, PERICARDIUM;  Surgeon:  Shar Walden MD;  [address]  Service: Cardiovascular;;    INSERTION OF IMPLANTABLE CARDIOVERTER-DEFIBRILLATOR (ICD) GENERATOR WITH TWO EXISTING LEADS      INSERTION OF PACEMAKER Left     LEFT VENTRICULAR ASSIST DEVICE N/A 9/10/2019    Procedure: INSERTION-LEFT VENTRICULAR ASSIST DEVICE;  Surgeon: Shar Walden MD;  [facility];  Service: Cardiovascular;  Laterality: N/A;  DT HM3     LUMBAR FUSION  2007    L4-L5    RIGHT HEART CATHETERIZATION Right 9/4/2019    Procedure: INSERTION, CATHETER, RIGHT HEART;  Surgeon: Vi Bryant MD;  Location: [facility] CATH LAB;  Service: Cardiology;  Laterality: Right;    RIGHT HEART CATHETERIZATION N/A 11/18/2019    Procedure: INSERTION, CATHETER, RIGHT HEART;  Surgeon: Eric Antunez Jr., MD;  Location: [facility] CATH LAB;  Service: Cardiology;  Laterality: N/A;    SINUS SURGERY Right 1994    with lymph nodes    STERNAL WOUND CLOSURE  9/10/2019    Procedure: CLOSURE, WOUND, STERNUM;  Surgeon: Shar Walden MD;  [address];  Service: Cardiovascular;;    TEMPOROMANDIBULAR JOINT SURGERY Right 1988    TONSILLECTOMY      TREATMENT OF CARDIAC ARRHYTHMIA N/A 9/24/2019    Procedure: CARDIOVERSION;  Surgeon: Moe Barrera MD;  Location: [facility] EP LAB;  Service: Cardiology;  Laterality: N/A;  AF, DCCV/NADINE, anes, DM, Rm 3073    TYMPANOSTOMY TUBE PLACEMENT  1971- 1979    multiple tube placements       Review of patient's allergies indicates:   Allergen Reactions    Adhesive Blisters     Reaction to area in chest and up only    Codeine Itching       No current facility-administered medications on file prior to encounter.      Current Outpatient Medications on File Prior to Encounter   Medication Sig    ALPRAZolam (XANAX) 0.25 MG tablet Take 1 tablet (0.25 mg total) by mouth 2 (two) times daily as needed for Anxiety.    amiodarone (PACERONE) 200 MG Tab Take 2 tablets (400mg) by mouth twice daily for 14 days then take 1 tablet  (400mg) by mouth daily    GABAPENTIN ORAL Take 200 mg by mouth 2 (two) times daily.    lisinopril 10 MG tablet Take 1 tablet (10 mg total) by mouth 2 (two) times daily.    magnesium oxide (MAG-OX) 400 mg (241.3 mg magnesium) tablet Take 1 tablet (400 mg total) by mouth once daily.    mexiletine (MEXITIL) 150 MG Cap Take 1 capsule (150 mg total) by mouth every 8 (eight) hours.    oxyCODONE-acetaminophen (PERCOCET) 5-325 mg per tablet Take 1 tablet by mouth once daily.    VENLAFAXINE HCL (EFFEXOR ORAL) Take 150 mg by mouth once daily.    warfarin (COUMADIN) 3 MG tablet Take 3mg orally daily every Mon/Wed/Fri and 1.5mg orally on all other days    zolpidem (AMBIEN) 5 MG Tab Take 5 mg by mouth nightly as needed.    aspirin (ECOTRIN) 325 MG EC tablet Take 1 tablet (325 mg total) by mouth once daily.    FLUZONE QUAD [id number], PF, 60 mcg (15 mcg x 4)/0.5 mL Syrg TO BE ADMINISTERED BY PHARMACIST FOR IMMUNIZATION    furosemide (LASIX) 20 MG tablet Use as directed for weight gain >3 lbs    mirtazapine (REMERON) 15 MG tablet Take 1 tablet (15 mg total) by mouth every evening.    pantoprazole (PROTONIX) 40 MG tablet Take 1 tablet (40 mg total) by mouth once daily.    senna-docusate 8.6-50 mg (PERICOLACE) 8.6-50 mg per tablet Take 2 tablets by mouth 2 (two) times daily as needed for Constipation.     Family History     Problem Relation (Age of Onset)    Broken bones Maternal Grandmother    Cancer Paternal Grandmother    Dislocations Maternal Grandmother    Heart failure Paternal Grandfather, Maternal Grandfather    Migraines Mother, Maternal Grandmother, Paternal Grandmother, Paternal Grandfather, Maternal Grandfather    Obesity Paternal Grandmother    Osteoarthritis Mother, Maternal Grandmother, Paternal Grandmother, Paternal Grandfather, Maternal Grandfather, Father    Osteoporosis Maternal Grandmother    Scoliosis Maternal Grandmother    Stroke Father        Tobacco Use    Smoking status: Never Smoker     Smokeless tobacco: Never Used   Substance and Sexual Activity    Alcohol use: No    Drug use: No    Sexual activity: Not Currently     Review of Systems   Constitution: Negative for chills, decreased appetite and diaphoresis.   HENT: Negative for congestion and ear discharge.    Eyes: Negative for blurred vision and discharge.   Cardiovascular: Negative for chest pain, dyspnea on exertion, irregular heartbeat, leg swelling and paroxysmal nocturnal dyspnea.   Respiratory: Negative for cough, hemoptysis and shortness of breath.    Gastrointestinal: Negative for abdominal pain.     Objective:     Vital Signs (Most Recent):  Temp: 98 °F (36.7 °C) (11/25/19 1100)  Pulse: 83 (11/25/19 1300)  Resp: 15 (11/25/19 1300)  BP: (!) 62/0 (11/25/19 1200)  SpO2: 100 % (11/25/19 1100) Vital Signs (24h Range):  Temp:  [97.8 °F (36.6 °C)-98.4 °F (36.9 °C)] 98 °F (36.7 °C)  Pulse:  [] 83  Resp:  [15-43] 15  SpO2:  [97 %-100 %] 100 %  BP: (62-82)/(0) 62/0       Weight: 101.7 kg (224 lb 3.3 oz)  Body mass index is 35.12 kg/m².    SpO2: 100 %  O2 Device (Oxygen Therapy): nasal cannula    Physical Exam   Constitutional: She is oriented to person, place, and time. She appears well-developed and well-nourished. No distress.   Eyes: Pupils are equal, round, and reactive to light. Conjunctivae are normal.   Neck: No tracheal deviation present. No thyromegaly present.   Cardiovascular: Normal rate, regular rhythm, normal heart sounds and intact distal pulses. Exam reveals no gallop and no friction rub.   No murmur heard.  Pulses:       Radial pulses are 2+ on the right side, and 2+ on the left side.        Femoral pulses are 2+ on the right side, and 2+ on the left side.  LVAD Hum   Pulmonary/Chest: Effort normal and breath sounds normal. No respiratory distress. She has no wheezes. She has no rales.   Abdominal: Soft. Bowel sounds are normal. She exhibits no distension. There is no tenderness.   Musculoskeletal: She exhibits no edema  or deformity.   Neurological: She is alert and oriented to person, place, and time. No cranial nerve deficit. Coordination normal.   Skin: Skin is warm and dry. She is not diaphoretic.   Psychiatric: She has a normal mood and affect. Her behavior is normal.       Significant Labs:   CMP:   Recent Labs   Lab 11/24/19  0356 11/25/19  0357 11/25/19  1145    141 141   K 4.2 4.0 3.7    100 99   CO2 30* 29 32*   * 140* 103   BUN 33* 31* 29*   CREATININE 1.6* 1.5* 1.5*   CALCIUM 9.1 9.7 9.8   PROT  --  7.0  --    ALBUMIN  --  3.5  --    BILITOT  --  0.5  --    ALKPHOS  --  90  --    AST  --  19  --    ALT  --  15  --    ANIONGAP 9 12 10   ESTGFRAFRICA 43.0* 46.5* 46.5*   EGFRNONAA 37.3* 40.3* 40.3*       Significant Imaging: Echocardiogram:   Transthoracic echo (TTE) complete (Cupid Only):   Results for orders placed or performed during the hospital encounter of 11/13/19   Echo Color Flow Doppler? Yes   Result Value Ref Range    BSA 2.25 m2    LA WIDTH 4.84 cm    LVIDD 6.30 (A) 3.5 - 6.0 cm    IVS 0.71 0.6 - 1.1 cm    PW 0.84 0.6 - 1.1 cm    LVIDS 4.94 (A) 2.1 - 4.0 cm    FS 22 28 - 44 %    LA volume 84.33 cm3    Sinus 3.12 cm    STJ 2.85 cm    Ascending aorta 2.96 cm    LV mass 195.03 g    LA size 4.22 cm    RVDD 4.47 cm    TAPSE 2.46 cm    Left Ventricle Relative Wall Thickness 0.27 cm    LVOT diameter 1.67 cm    LVOT area 2.2 cm2    TR Max Scott 1.80 m/s    LV Systolic Volume 115.20 mL    LV Systolic Volume Index 53.1 mL/m2    LV Diastolic Volume 197.05 mL    LV Diastolic Volume Index 90.85 mL/m2    LA Volume Index 38.9 mL/m2    LV Mass Index 90 g/m2    RA Major Axis 5.13 cm    Left Atrium Minor Axis 4.97 cm    Left Atrium Major Axis 4.75 cm    Triscuspid Valve Regurgitation Peak Gradient 13 mmHg    RA Width 4.21 cm    Narrative    · Severe left ventricular enlargement.  · LVAD present. Base speed is 5300 RPMs. The pump type is a Heartmate III.  · The interventricular septum appears to bow into the  right ventricle. The   aortic valve does not open.  · Mild left atrial enlargement.  · Severely decreased left ventricular systolic function. The estimated   ejection fraction is 20%  · Left ventricular diastolic dysfunction.  · Mild right ventricular enlargement.  · Moderate-to-severe mitral regurgitation.  · Moderate tricuspid regurgitation.  · Indeterminate central venous pressure. Estimated PA pressure is at least   13 mmHg.  · There is a left pleural effusion.                  Assessment and Plan:     Ventricular tachycardia  - Patient seen and examined  - Appropriate shocks after ATP failed to terminate MMVT  (1st episode), Appropriate shock after acceleration to VF zone with ATP (1st shock failed to terminate ->  2nd shock terminated)  - Agree with Amiodarone bolus leave on Amio gtt for 24 hrs   - Increase Mexiletine to 200 mg TID  - If more VT with therapy consider IV Lidocaine (and stop Mexelitine)  - Recommend weaning Epi gtt off when able  - Optimize volume status   - Keep Mg >2 K>4          Thank you for your consult. I will follow-up with patient. Please contact us if you have any additional questions.    Jonathan Manriquez MD  Cardiac Electrophysiology  [facility]

## 2019-11-25 NOTE — PROGRESS NOTES
"  [facility]  Adult Nutrition  Consult Note    SUMMARY     Recommendations    1. Continue current Cardiac diet.   2. RD to monitor & follow-up.    Goals: PO intake >50%  Nutrition Goal Status: new  Communication of RD Recs: reviewed with RN    Reason for Assessment    Reason For Assessment: length of stay  Diagnosis: other (see comments)(LVAD present)  Relevant Medical History: LVAD, HLD  Interdisciplinary Rounds: attended    General Information Comments: Pt reports excellent appetite, consuming 100% of meals. PTA, pt w/ good appetite & stable wt. NFPE not indicated, pt appears nourished w/ no physical signs of malnutrition. Pt w/ no further questions about low sodium diet or Coumadin.  Nutrition Discharge Planning: Adequate PO intake    Nutrition/Diet History    Patient Reported Diet/Restrictions/Preferences: low salt  Spiritual, Cultural Beliefs, Adventism Practices, Values that Affect Care: no  Factors Affecting Nutritional Intake: None identified at this time    Anthropometrics    Temp: 98.2 °F (36.8 °C)  Height Method: Stated  Height: 5' 7" (170.2 cm)  Height (inches): 67 in  Weight Method: Standard Scale  Weight: 101.7 kg (224 lb 3.3 oz)  Weight (lb): 224.21 lb  Ideal Body Weight (IBW), Female: 135 lb  % Ideal Body Weight, Female (lb): 166.08 lb  BMI (Calculated): 35.1  BMI Grade: 35 - 39.9 - obesity - grade II    Lab/Procedures/Meds    Pertinent Labs Reviewed: reviewed  Pertinent Labs Comments: BUN 31, Creat 1.5, GFR 40.3  Pertinent Medications Reviewed: reviewed  Pertinent Medications Comments: Epi, Warfarin    Estimated/Assessed Needs    Weight Used For Calorie Calculations: 101.7 kg (224 lb 3.3 oz)     Energy Calorie Requirements (kcal): 2088 kcal/d  Energy Need Method: [place]-St Jeor(1.25 PAL)     Protein Requirements: 102 g/d (1 g/kg)  Weight Used For Protein Calculations: 101.7 kg (224 lb 3.3 oz)     Estimated Fluid Requirement Method: other (see comments)(Per MD or 1 mL/kcal) "     Nutrition Prescription Ordered    Current Diet Order: Cardiac    Evaluation of Received Nutrient/Fluid Intake    Comments: LBM: 11/23    Tolerance: tolerating    Nutrition Risk    Level of Risk/Frequency of Follow-up: (1x/week)     Assessment and Plan    No nutritional dx at this time.     Monitor and Evaluation    Food and Nutrient Intake: energy intake, food and beverage intake  Food and Nutrient Adminstration: diet order  Physical Activity and Function: nutrition-related ADLs and IADLs  Anthropometric Measurements: weight, weight change  Biochemical Data, Medical Tests and Procedures: inflammatory profile, lipid profile, glucose/endocrine profile, gastrointestinal profile, electrolyte and renal panel  Nutrition-Focused Physical Findings: overall appearance     Nutrition Follow-Up    RD Follow-up?: Yes

## 2019-11-25 NOTE — PLAN OF CARE
CO 6.6 / CI 3 /  / SVO2 57 (62) / CVP 8  - Decreasing Simon from 10 to 5  Discussed with HTN staff    Luis Alves MD  Cardiology  PGY-4  Pager: [phone]

## 2019-11-25 NOTE — PROGRESS NOTES
[facility]  Heart Transplant  Progress Note    Patient Name: Deborah Navas  MRN: [id number]  Admission Date: 11/13/2019  Hospital Length of Stay: 10 days  Attending Physician: Renetta Chaudhari MD  Primary Care Provider: Primary Doctor No  Principal Problem:Left ventricular assist device (LVAD) complication    Subjective:     Interval History: Continues to have NSVT. Otherwise feels well this AM.  Shocked x 3 this AM (around 11:30) for VT. EP re-consulted- spoke with Dr Alves over the phone who stated to place consult and re bolus with Amio.   Will likely try for exception and increase in status for transplant due to RV failure, VT and ICD shocks.     Continuous Infusions:   sodium chloride 0.9%      amiodarone in dextrose 5% 1 mg/min (11/25/19 1218)    amiodarone in dextrose 5%      epinephrine 0.02 mcg/kg/min (11/25/19 1100)    nitric oxide gas       Scheduled Meds:   aspirin  325 mg Oral Daily    docusate sodium  50 mg Oral Daily    furosemide  80 mg Intravenous BID    gabapentin  200 mg Oral BID    hepatitis A virus vaccine (PF)  1,440 Units Intramuscular Once    lisinopril  10 mg Oral BID    magnesium oxide  400 mg Oral Daily    mexiletine  150 mg Oral Q8H    mirtazapine  15 mg Oral QHS    pantoprazole  40 mg Oral Daily    polyethylene glycol  17 g Oral Daily    spironolactone  12.5 mg Oral Daily    venlafaxine  150 mg Oral Daily    warfarin  3 mg Oral Daily     PRN Meds:sodium chloride 0.9%, ALPRAZolam, pneumoc 13-amber conj-dip cr(PF), promethazine, senna-docusate 8.6-50 mg, zolpidem    Review of patient's allergies indicates:   Allergen Reactions    Adhesive Blisters     Reaction to area in chest and up only    Codeine Itching     Objective:     Vital Signs (Most Recent):  Temp: 98 °F (36.7 °C) (11/25/19 1100)  Pulse: (!) 184 (11/25/19 1207)  Resp: (!) 23 (11/25/19 1207)  BP: (!) 62/0 (11/25/19 1200)  SpO2: 100 % (11/25/19 0800) Vital Signs (24h Range):  Temp:   [97.8 °F (36.6 °C)-98.4 °F (36.9 °C)] 98 °F (36.7 °C)  Pulse:  [] 184  Resp:  [15-43] 23  SpO2:  [97 %-100 %] 100 %  BP: (62-82)/(0) 62/0     Patient Vitals for the past 72 hrs (Last 3 readings):   Weight   11/25/19 1100 101.7 kg (224 lb 3.3 oz)   11/25/19 0500 101.7 kg (224 lb 3.3 oz)   11/24/19 0500 101.9 kg (224 lb 10.4 oz)     Body mass index is 35.12 kg/m².      Intake/Output Summary (Last 24 hours) at 11/25/2019 1229  Last data filed at 11/25/2019 1213  Gross per 24 hour   Intake 843 ml   Output 2350 ml   Net -1507 ml       Hemodynamic Parameters:  CVP:  [8 mmHg] 8 mmHg      Physical Exam   Constitutional: She is oriented to person, place, and time. She appears well-developed and well-nourished.   HENT:   Mouth/Throat: Oropharynx is clear and moist.   Eyes: EOM are normal.   Neck: No JVD present.   Cardiovascular: Normal rate, regular rhythm and intact distal pulses.   Smooth VAD hum   Pulmonary/Chest: Effort normal and breath sounds normal. No respiratory distress. She has no rales.   Abdominal: Soft. Bowel sounds are normal. She exhibits no distension. There is no tenderness. There is no guarding.   Musculoskeletal: She exhibits no edema.   Neurological: She is alert and oriented to person, place, and time.   Skin: Skin is warm.   Psychiatric: She has a normal mood and affect.   Nursing note and vitals reviewed.      Significant Labs:  CBC:  Recent Labs   Lab 11/23/19  0403 11/24/19  0356 11/25/19  0357   WBC 5.01 5.27 5.24   RBC 4.23 4.31 4.44   HGB 10.6* 10.5* 11.0*   HCT 36.1* 37.1 38.2    297 309   MCV 85 86 86   MCH 25.1* 24.4* 24.8*   MCHC 29.4* 28.3* 28.8*     BNP:  Recent Labs   Lab 11/20/19  0606 11/22/19 0349 11/25/19 0357   * 176* 192*     CMP:  Recent Labs   Lab 11/20/19  0606  11/22/19 0349 11/24/19 0356 11/25/19 0357 11/25/19  1145   *   < > 132*   < > 127* 140* 103   CALCIUM 9.8   < > 9.3   < > 9.1 9.7 9.8   ALBUMIN 3.8  --  3.3*  --   --  3.5  --    PROT 7.9   --  6.9  --   --  7.0  --       < > 141   < > 140 141 141   K 3.5   < > 4.0   < > 4.2 4.0 3.7   CO2 25   < > 29   < > 30* 29 32*      < > 101   < > 101 100 99   BUN 31*   < > 37*   < > 33* 31* 29*   CREATININE 1.7*   < > 2.0*   < > 1.6* 1.5* 1.5*   ALKPHOS 97  --  92  --   --  90  --    ALT 15  --  18  --   --  15  --    AST 22  --  21  --   --  19  --    BILITOT 0.5  --  0.5  --   --  0.5  --     < > = values in this interval not displayed.      Coagulation:   Recent Labs   Lab 11/23/19  0403 11/24/19  0356 11/25/19  0357   INR 2.1* 2.1* 1.9*   APTT 33.9* 33.1* 31.3     LDH:  Recent Labs   Lab 11/23/19  0403 11/24/19  0356 11/25/19  0357    208 205     Microbiology:  Microbiology Results (last 7 days)     ** No results found for the last 168 hours. **          I have reviewed all pertinent labs within the past 24 hours.    Estimated Creatinine Clearance: 55 mL/min (A) (based on SCr of 1.5 mg/dL (H)).    Diagnostic Results:  I have reviewed and interpreted all pertinent imaging results/findings within the past 24 hours.    Assessment and Plan:     50 yo WF with NICM, s/p HM3 implantation 9/10/19 (post up coarse uncomplicated with the exception of+ diaphragmatic stimulation of LV lead and pleural effusion with chest tube placement, atrial flutter with NADINE/DCCV), V-fib reported in setting of hypokalemia with appropriate shock from ICD on Amiodarone prior to LVAD placement; and recent hospitalizations for ventricular arrythmias; started on Mexilitine.  She was seen in EP clinic today and she continues to have multiple VT episodes with some ATP therapy, although no ICD shocks since starting mexiletine 10/24/2019. One slow VT episode 2.5hrs 11/3/2019. Patient asymptomatic with LVAD. On coumadin. No AFL since post-op event (paced out of rhythm 9/24/2019). CHB with 100% V pacing (LV lead off). She does not feel well. Dry heaving with mexiletine. Taking phenergan PRN. She has been told she is not a good  VT RFA candidate due to multiple VT loci.   She was seen in HTS clinic and reported 4 low flow alarms the last 4 nights.  She reports they occur in her sleep and last for only a few seconds.  By the time she wakes up; they quickly stop.  She does report to possibly being little volume overloaded.  She hasn't noticed weight gain at home but thinks she may have little extra fluid.  She denies SOB, chest pain, palpitations, LEGER. In review of her records: she was 223lbs on 10/24/19 during previous hospitalization and is 235lbs now.  Her lasix had previously been decreased to prn.     * Left ventricular assist device (LVAD) complication  -hx low flow alarms prior to admit, last LFA 11/21  -Possibly secondary to ADHF.  -Echo shows IVS shifting to right side and LVED increased to 6.4.    -Formal report of CT unremarkable; however, it was a non contrast as creat was elevated above baseline on admit and inflow and outflow cannulas not accurately visualized     Organ transplant candidate  - will attempt to list with exemption due to VT with ICD shocks and RV failure (will not tolerate )    Essential hypertension  -Patient is non-pulsatile  -Doppler goal 60-90 mmHg  -Antihypertensive medications include  Lisinopril  -Hydralazine was started on evening of admit and then changed to low dose Norvasc and Spironolactone to aid with BP and low K+.  Hesitated on increasing Lisinopril because of active diuresis and concern for fluctuation of creatinine    - amlodipine now on hold in setting of symptomatic hypotension and LFAs on 11/16      Ventricular tachycardia  - Per EP office visit on day of admission: She continues to have multiple VT episodes with some ATP therapy, although no ICD shocks since starting mexiletine 10/24/2019. One slow VT episode 2.5hrs 11/3/2019. Patient asymptomatic with LVAD. On coumadin. No AFL since post-op event (paced out of rhythm 9/24/2019). CHB with 100% V pacing (LV lead off).   - Plan was to  continue current regimen despite Mexiletine making her nauseous.  - Decreased Amiodarone to daily per EP plan on discharge last hospitalization   - re consulted EP today for ICD shocks in setting of VT   - stat labs and echo, stat ICD interrogation. Re bolus Amio per EP.     LVAD (left ventricular assist device) present  -HeartMate 3 Implanted 9/10/19 as DT.  -Implanted by Dr. Walden  -INR sub therapeutic. Goal INR 2.0-3.0.Boost Coumadin.   - Previous home dose was 3mg on Wed and 1.5mg QDaily  -Antiplatelets:  mg.  -LDH is stable Will monitor daily.   -Speed set at 5300, had another asymptomatic LFA on 11/21  -Not listed for OHTx: was declined due to pulmonary hypertension and incomplete care giving plan. Patient now has care giving plan. 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    Procedure: Device Interrogation Including analysis of device parameters  Current Settings: Ventricular Assist Device  Review of device function is stable    TXP LVAD INTERROGATIONS 11/25/2019 11/25/2019 11/25/2019 11/25/2019 11/25/2019 11/25/2019 11/25/2019   Type HeartMate II;HeartMate3 HeartMate3 HeartMate3 - HeartMate3 HeartMate3 HeartMate3   Flow 3.7 4.0 4.0 4.0 4.1 4.2 4.0   Speed 5300 5300 5300 5300 5300 5350 5300   PI 3.2 3.8 3.7 3.5 3.5 3.9 3.9   Power ([place]) 3.7 3.7 3.7 3.7 3.7 3.8 3.7   LSL 4900 4900 4900 4900 4900 4900 4900   Pulsatility Intermittent pulse Intermittent pulse Intermittent pulse Intermittent pulse Intermittent pulse Intermittent pulse Intermittent pulse       CKD (chronic kidney disease)  - Baseline creat appears 1.3-1.6  - Will monitor with diuresis     Acute on chronic combined systolic and diastolic heart failure  -NICM  -Last 2D Echo 11/13/19: LVEF 20%, LVEDD 6.3 cm with speed at 5300 and severe MR  -Repeat echo today pending  -lasix 80 IV BID currently   - now on EPI .02 and NO @ 5 (increased back to 10 after ICD shocks). Svo2 57/58 from 60-70's with drop in NO from 10-->5   - CVP 9  this AM  - GDMT: Continue ACE 10 BID, aldactone 12.5   - was on lasix 20 PRN at home PTA  -2g Na dietary restriction, 1500 mL fluid restriction, strict I/Os      ICD (implantable cardioverter-defibrillator) in place  - Medtronic ICD  - last interrogation on 11/20/19 with ATP x9 for VT, no shocks   - Shocked x 3 today for VT- awaiting final ep recs. amio re-bolus in the interim.   - See above VT        Julieth Ramos PA-C  Heart Transplant  [facility]    Uninterrupted Critical Care/Counseling Time (not including procedures): 45 minutes

## 2019-11-25 NOTE — HPI
49 y F with PMH AF/AFL, NICM s/p BiV ICD 2017 Medtronic, CRT-D upgrade 2/19,  LVAD HM3 9/19, VT/VF on Amio, Mexelitine (started 10/30/19) last shock about a month ago. LV lead off post LVAD. She presented 11/13 with volume overload and RV failure via HTS clinic, she was started on Epi gtt and Nitric oxide 11/21. She has been having NS-VT with some ATP's since admission but today suffered 3 ICD shocks in the space of 20 minsutes. She was resting in bed and suddenly felt palpitations followed by a shock. She denies chest pain (just sore), syncope etc. She is hemodynamically stable. K 3.7, Mg 2.2. Device interrogated and revealed monormorphic VT slightly accelerated by ATP (162 bpm) and shocked x1 with termination followed by monomorphic  VT accelerated to FVT/VF range ( bpm) and shocked x 2 (1st unsuccessful remained in VF range) with termination.

## 2019-11-25 NOTE — SUBJECTIVE & OBJECTIVE
Past Medical History:   Diagnosis Date    Fractures     History of ventricular fibrillation 9/4/2019    History of ventricular tachycardia 9/4/2019    Hyperlipidemia     Migraine     Osteoarthritis        Past Surgical History:   Procedure Laterality Date    BACK SURGERY      2007    BONE GRAFT Left     from Left hip to Left FA    eardrum reconstruction  1980    ELBOW SURGERY Left 5920-5179    FOREARM FRACTURE SURGERY Bilateral [id number]    multiple surgeries    INSERTION OF GRAFT TO PERICARDIUM  9/10/2019    Procedure: INSERTION, GRAFT, PERICARDIUM;  Surgeon: Shar Walden MD;  Location: [facility] OR [address] [place] [address];  Service: Cardiovascular;;    INSERTION OF IMPLANTABLE CARDIOVERTER-DEFIBRILLATOR (ICD) GENERATOR WITH TWO EXISTING LEADS      INSERTION OF PACEMAKER Left     LEFT VENTRICULAR ASSIST DEVICE N/A 9/10/2019    Procedure: INSERTION-LEFT VENTRICULAR ASSIST DEVICE;  Surgeon: Shar Walden MD;  Location: [facility] OR [address] [place] [address];  Service: Cardiovascular;  Laterality: N/A;  DT HM3     LUMBAR FUSION  2007    L4-L5    RIGHT HEART CATHETERIZATION Right 9/4/2019    Procedure: INSERTION, CATHETER, RIGHT HEART;  Surgeon: Vi Bryant MD;  Location: [facility] CATH LAB;  Service: Cardiology;  Laterality: Right;    RIGHT HEART CATHETERIZATION N/A 11/18/2019    Procedure: INSERTION, CATHETER, RIGHT HEART;  Surgeon: Eric Antunez Jr., MD;  Location: [facility] CATH LAB;  Service: Cardiology;  Laterality: N/A;    SINUS SURGERY Right 1994    with lymph nodes    STERNAL WOUND CLOSURE  9/10/2019    Procedure: CLOSURE, WOUND, STERNUM;  Surgeon: Shar Walden MD;  Location: [facility] OR [address] [place] [address];  Service: Cardiovascular;;    TEMPOROMANDIBULAR JOINT SURGERY Right 1988    TONSILLECTOMY      TREATMENT OF CARDIAC ARRHYTHMIA N/A 9/24/2019    Procedure: CARDIOVERSION;  Surgeon: Moe Barrera MD;  Location: [facility] EP LAB;  Service: Cardiology;  Laterality: N/A;  AF, DCCV/NADINE, anes, DM, Rm 3073    TYMPANOSTOMY TUBE PLACEMENT  1971-  1979    multiple tube placements       Review of patient's allergies indicates:   Allergen Reactions    Adhesive Blisters     Reaction to area in chest and up only    Codeine Itching       No current facility-administered medications on file prior to encounter.      Current Outpatient Medications on File Prior to Encounter   Medication Sig    ALPRAZolam (XANAX) 0.25 MG tablet Take 1 tablet (0.25 mg total) by mouth 2 (two) times daily as needed for Anxiety.    amiodarone (PACERONE) 200 MG Tab Take 2 tablets (400mg) by mouth twice daily for 14 days then take 1 tablet (400mg) by mouth daily    GABAPENTIN ORAL Take 200 mg by mouth 2 (two) times daily.    lisinopril 10 MG tablet Take 1 tablet (10 mg total) by mouth 2 (two) times daily.    magnesium oxide (MAG-OX) 400 mg (241.3 mg magnesium) tablet Take 1 tablet (400 mg total) by mouth once daily.    mexiletine (MEXITIL) 150 MG Cap Take 1 capsule (150 mg total) by mouth every 8 (eight) hours.    oxyCODONE-acetaminophen (PERCOCET) 5-325 mg per tablet Take 1 tablet by mouth once daily.    VENLAFAXINE HCL (EFFEXOR ORAL) Take 150 mg by mouth once daily.    warfarin (COUMADIN) 3 MG tablet Take 3mg orally daily every Mon/Wed/Fri and 1.5mg orally on all other days    zolpidem (AMBIEN) 5 MG Tab Take 5 mg by mouth nightly as needed.    aspirin (ECOTRIN) 325 MG EC tablet Take 1 tablet (325 mg total) by mouth once daily.    FLUZONE QUAD 4728-4836, PF, 60 mcg (15 mcg x 4)/0.5 mL Syrg TO BE ADMINISTERED BY PHARMACIST FOR IMMUNIZATION    furosemide (LASIX) 20 MG tablet Use as directed for weight gain >3 lbs    mirtazapine (REMERON) 15 MG tablet Take 1 tablet (15 mg total) by mouth every evening.    pantoprazole (PROTONIX) 40 MG tablet Take 1 tablet (40 mg total) by mouth once daily.    senna-docusate 8.6-50 mg (PERICOLACE) 8.6-50 mg per tablet Take 2 tablets by mouth 2 (two) times daily as needed for Constipation.     Family History     Problem Relation (Age of Onset)     Broken bones Maternal Grandmother    Cancer Paternal Grandmother    Dislocations Maternal Grandmother    Heart failure Paternal Grandfather, Maternal Grandfather    Migraines Mother, Maternal Grandmother, Paternal Grandmother, Paternal Grandfather, Maternal Grandfather    Obesity Paternal Grandmother    Osteoarthritis Mother, Maternal Grandmother, Paternal Grandmother, Paternal Grandfather, Maternal Grandfather, Father    Osteoporosis Maternal Grandmother    Scoliosis Maternal Grandmother    Stroke Father        Tobacco Use    Smoking status: Never Smoker    Smokeless tobacco: Never Used   Substance and Sexual Activity    Alcohol use: No    Drug use: No    Sexual activity: Not Currently     Review of Systems   Constitution: Negative for chills, decreased appetite and diaphoresis.   HENT: Negative for congestion and ear discharge.    Eyes: Negative for blurred vision and discharge.   Cardiovascular: Negative for chest pain, dyspnea on exertion, irregular heartbeat, leg swelling and paroxysmal nocturnal dyspnea.   Respiratory: Negative for cough, hemoptysis and shortness of breath.    Gastrointestinal: Negative for abdominal pain.     Objective:     Vital Signs (Most Recent):  Temp: 98 °F (36.7 °C) (11/25/19 1100)  Pulse: 83 (11/25/19 1300)  Resp: 15 (11/25/19 1300)  BP: (!) 62/0 (11/25/19 1200)  SpO2: 100 % (11/25/19 1100) Vital Signs (24h Range):  Temp:  [97.8 °F (36.6 °C)-98.4 °F (36.9 °C)] 98 °F (36.7 °C)  Pulse:  [] 83  Resp:  [15-43] 15  SpO2:  [97 %-100 %] 100 %  BP: (62-82)/(0) 62/0       Weight: 101.7 kg (224 lb 3.3 oz)  Body mass index is 35.12 kg/m².    SpO2: 100 %  O2 Device (Oxygen Therapy): nasal cannula    Physical Exam   Constitutional: She is oriented to person, place, and time. She appears well-developed and well-nourished. No distress.   Eyes: Pupils are equal, round, and reactive to light. Conjunctivae are normal.   Neck: No tracheal deviation present. No thyromegaly present.    Cardiovascular: Normal rate, regular rhythm, normal heart sounds and intact distal pulses. Exam reveals no gallop and no friction rub.   No murmur heard.  Pulses:       Radial pulses are 2+ on the right side, and 2+ on the left side.        Femoral pulses are 2+ on the right side, and 2+ on the left side.  LVAD Hum   Pulmonary/Chest: Effort normal and breath sounds normal. No respiratory distress. She has no wheezes. She has no rales.   Abdominal: Soft. Bowel sounds are normal. She exhibits no distension. There is no tenderness.   Musculoskeletal: She exhibits no edema or deformity.   Neurological: She is alert and oriented to person, place, and time. No cranial nerve deficit. Coordination normal.   Skin: Skin is warm and dry. She is not diaphoretic.   Psychiatric: She has a normal mood and affect. Her behavior is normal.       Significant Labs:   CMP:   Recent Labs   Lab 11/24/19  0356 11/25/19  0357 11/25/19  1145    141 141   K 4.2 4.0 3.7    100 99   CO2 30* 29 32*   * 140* 103   BUN 33* 31* 29*   CREATININE 1.6* 1.5* 1.5*   CALCIUM 9.1 9.7 9.8   PROT  --  7.0  --    ALBUMIN  --  3.5  --    BILITOT  --  0.5  --    ALKPHOS  --  90  --    AST  --  19  --    ALT  --  15  --    ANIONGAP 9 12 10   ESTGFRAFRICA 43.0* 46.5* 46.5*   EGFRNONAA 37.3* 40.3* 40.3*       Significant Imaging: Echocardiogram:   Transthoracic echo (TTE) complete (Cupid Only):   Results for orders placed or performed during the hospital encounter of 11/13/19   Echo Color Flow Doppler? Yes   Result Value Ref Range    BSA 2.25 m2    LA WIDTH 4.84 cm    LVIDD 6.30 (A) 3.5 - 6.0 cm    IVS 0.71 0.6 - 1.1 cm    PW 0.84 0.6 - 1.1 cm    LVIDS 4.94 (A) 2.1 - 4.0 cm    FS 22 28 - 44 %    LA volume 84.33 cm3    Sinus 3.12 cm    STJ 2.85 cm    Ascending aorta 2.96 cm    LV mass 195.03 g    LA size 4.22 cm    RVDD 4.47 cm    TAPSE 2.46 cm    Left Ventricle Relative Wall Thickness 0.27 cm    LVOT diameter 1.67 cm    LVOT area 2.2 cm2     TR Max Scott 1.80 m/s    LV Systolic Volume 115.20 mL    LV Systolic Volume Index 53.1 mL/m2    LV Diastolic Volume 197.05 mL    LV Diastolic Volume Index 90.85 mL/m2    LA Volume Index 38.9 mL/m2    LV Mass Index 90 g/m2    RA Major Axis 5.13 cm    Left Atrium Minor Axis 4.97 cm    Left Atrium Major Axis 4.75 cm    Triscuspid Valve Regurgitation Peak Gradient 13 mmHg    RA Width 4.21 cm    Narrative    · Severe left ventricular enlargement.  · LVAD present. Base speed is 5300 RPMs. The pump type is a Heartmate III.  · The interventricular septum appears to bow into the right ventricle. The   aortic valve does not open.  · Mild left atrial enlargement.  · Severely decreased left ventricular systolic function. The estimated   ejection fraction is 20%  · Left ventricular diastolic dysfunction.  · Mild right ventricular enlargement.  · Moderate-to-severe mitral regurgitation.  · Moderate tricuspid regurgitation.  · Indeterminate central venous pressure. Estimated PA pressure is at least   13 mmHg.  · There is a left pleural effusion.

## 2019-11-25 NOTE — PT/OT/SLP PROGRESS
Occupational Therapy      Patient Name:  Deborah Navas   MRN:  [id number]    Pt was t/f to ICU 11/20/19 after OT orders entered. OT will await new orders for prior to initiation of OT eval.     JAGJIT Madison  11/25/2019

## 2019-11-25 NOTE — ASSESSMENT & PLAN NOTE
- Patient seen and examined  - Appropriate shocks after ATP failed to terminate MMVT  (1st episode), Appropriate shock after acceleration to VF zone with ATP (1st shock failed to terminate ->  2nd shock terminated)  - Agree with Amiodarone bolus leave on Amio gtt for 24 hrs  - If more VT with therapy consider IV Lidocaine (and stop Mexelitine)  - Recommend weaning Epi gtt off when able  - Keep Mg >2 K>4

## 2019-11-25 NOTE — ASSESSMENT & PLAN NOTE
- Medtronic ICD  - last interrogation on 11/20/19 with ATP x9 for VT, no shocks   - Shocked x 3 today for VT- awaiting final ep recs. amio re-bolus in the interim.   - See above VT

## 2019-11-26 ENCOUNTER — COMMITTEE REVIEW (OUTPATIENT)
Dept: TRANSPLANT | Facility: CLINIC | Age: 49
End: 2019-11-26

## 2019-11-26 ENCOUNTER — DOCUMENTATION ONLY (OUTPATIENT)
Dept: TRANSPLANT | Facility: CLINIC | Age: 49
End: 2019-11-26

## 2019-11-26 LAB
ALLENS TEST: ABNORMAL
ANION GAP SERPL CALC-SCNC: 9 MMOL/L (ref 8–16)
APTT BLDCRRT: 33.3 SEC (ref 21–32)
BASOPHILS # BLD AUTO: 0.07 K/UL (ref 0–0.2)
BASOPHILS NFR BLD: 1.3 % (ref 0–1.9)
BUN SERPL-MCNC: 32 MG/DL (ref 6–20)
CALCIUM SERPL-MCNC: 9.6 MG/DL (ref 8.7–10.5)
CHLORIDE SERPL-SCNC: 99 MMOL/L (ref 95–110)
CO2 SERPL-SCNC: 31 MMOL/L (ref 23–29)
CREAT SERPL-MCNC: 1.7 MG/DL (ref 0.5–1.4)
DELSYS: ABNORMAL
DIFFERENTIAL METHOD: ABNORMAL
EOSINOPHIL # BLD AUTO: 0.2 K/UL (ref 0–0.5)
EOSINOPHIL NFR BLD: 3 % (ref 0–8)
ERYTHROCYTE [DISTWIDTH] IN BLOOD BY AUTOMATED COUNT: 16.8 % (ref 11.5–14.5)
EST. GFR  (AFRICAN AMERICAN): 40 ML/MIN/1.73 M^2
EST. GFR  (NON AFRICAN AMERICAN): 34.7 ML/MIN/1.73 M^2
FLOW: 4
GLUCOSE SERPL-MCNC: 149 MG/DL (ref 70–110)
HCO3 UR-SCNC: 33.6 MMOL/L (ref 24–28)
HCT VFR BLD AUTO: 38.5 % (ref 37–48.5)
HGB BLD-MCNC: 11 G/DL (ref 12–16)
IMM GRANULOCYTES # BLD AUTO: 0.01 K/UL (ref 0–0.04)
IMM GRANULOCYTES NFR BLD AUTO: 0.2 % (ref 0–0.5)
INR PPP: 1.9 (ref 0.8–1.2)
LDH SERPL L TO P-CCNC: 250 U/L (ref 110–260)
LYMPHOCYTES # BLD AUTO: 0.8 K/UL (ref 1–4.8)
LYMPHOCYTES NFR BLD: 14.6 % (ref 18–48)
MAGNESIUM SERPL-MCNC: 2.2 MG/DL (ref 1.6–2.6)
MCH RBC QN AUTO: 24.5 PG (ref 27–31)
MCHC RBC AUTO-ENTMCNC: 28.6 G/DL (ref 32–36)
MCV RBC AUTO: 86 FL (ref 82–98)
METHEMOGLOBIN: 0.4 % (ref 0–3)
MODE: ABNORMAL
MONOCYTES # BLD AUTO: 0.4 K/UL (ref 0.3–1)
MONOCYTES NFR BLD: 7.8 % (ref 4–15)
NEUTROPHILS # BLD AUTO: 3.9 K/UL (ref 1.8–7.7)
NEUTROPHILS NFR BLD: 73.1 % (ref 38–73)
NRBC BLD-RTO: 0 /100 WBC
OB PNL STL: NEGATIVE
PCO2 BLDA: 57.7 MMHG (ref 35–45)
PH SMN: 7.37 [PH] (ref 7.35–7.45)
PHOSPHATE SERPL-MCNC: 4 MG/DL (ref 2.7–4.5)
PLATELET # BLD AUTO: 326 K/UL (ref 150–350)
PMV BLD AUTO: 9.4 FL (ref 9.2–12.9)
PO2 BLDA: 33 MMHG (ref 40–60)
POC BE: 8 MMOL/L
POC SATURATED O2: 61 % (ref 95–100)
POC TCO2: 35 MMOL/L (ref 24–29)
POTASSIUM SERPL-SCNC: 4.4 MMOL/L (ref 3.5–5.1)
PROTHROMBIN TIME: 18.4 SEC (ref 9–12.5)
RBC # BLD AUTO: 4.49 M/UL (ref 4–5.4)
SAMPLE: ABNORMAL
SITE: ABNORMAL
SODIUM SERPL-SCNC: 139 MMOL/L (ref 136–145)
WBC # BLD AUTO: 5.27 K/UL (ref 3.9–12.7)

## 2019-11-26 PROCEDURE — 82803 BLOOD GASES ANY COMBINATION: CPT | Mod: NTX

## 2019-11-26 PROCEDURE — 20000000 HC ICU ROOM: Mod: NTX

## 2019-11-26 PROCEDURE — 63600175 PHARM REV CODE 636 W HCPCS: Mod: NTX | Performed by: STUDENT IN AN ORGANIZED HEALTH CARE EDUCATION/TRAINING PROGRAM

## 2019-11-26 PROCEDURE — 82800 BLOOD PH: CPT | Mod: NTX

## 2019-11-26 PROCEDURE — 25000003 PHARM REV CODE 250: Mod: NTX | Performed by: PHYSICIAN ASSISTANT

## 2019-11-26 PROCEDURE — 25000003 PHARM REV CODE 250: Mod: NTX | Performed by: STUDENT IN AN ORGANIZED HEALTH CARE EDUCATION/TRAINING PROGRAM

## 2019-11-26 PROCEDURE — 83735 ASSAY OF MAGNESIUM: CPT | Mod: NTX

## 2019-11-26 PROCEDURE — 27000248 HC VAD-ADDITIONAL DAY: Mod: NTX

## 2019-11-26 PROCEDURE — 99291 CRITICAL CARE FIRST HOUR: CPT | Mod: NTX,,, | Performed by: PHYSICIAN ASSISTANT

## 2019-11-26 PROCEDURE — 63600367 HC NITRIC OXIDE PER HOUR: Mod: NTX

## 2019-11-26 PROCEDURE — 85025 COMPLETE CBC W/AUTO DIFF WBC: CPT | Mod: NTX

## 2019-11-26 PROCEDURE — 97165 OT EVAL LOW COMPLEX 30 MIN: CPT | Mod: NTX

## 2019-11-26 PROCEDURE — 99232 SBSQ HOSP IP/OBS MODERATE 35: CPT | Mod: NTX,,, | Performed by: INTERNAL MEDICINE

## 2019-11-26 PROCEDURE — 85730 THROMBOPLASTIN TIME PARTIAL: CPT | Mod: NTX

## 2019-11-26 PROCEDURE — 82270 OCCULT BLOOD FECES: CPT | Mod: NTX

## 2019-11-26 PROCEDURE — 84100 ASSAY OF PHOSPHORUS: CPT | Mod: NTX

## 2019-11-26 PROCEDURE — 99232 PR SUBSEQUENT HOSPITAL CARE,LEVL II: ICD-10-PCS | Mod: NTX,,, | Performed by: INTERNAL MEDICINE

## 2019-11-26 PROCEDURE — 83050 HGB METHEMOGLOBIN QUAN: CPT | Mod: NTX

## 2019-11-26 PROCEDURE — 83615 LACTATE (LD) (LDH) ENZYME: CPT | Mod: NTX

## 2019-11-26 PROCEDURE — 85610 PROTHROMBIN TIME: CPT | Mod: NTX

## 2019-11-26 PROCEDURE — 93750 INTERROGATION VAD IN PERSON: CPT | Mod: NTX,,, | Performed by: INTERNAL MEDICINE

## 2019-11-26 PROCEDURE — 99291 PR CRITICAL CARE, E/M 30-74 MINUTES: ICD-10-PCS | Mod: NTX,,, | Performed by: PHYSICIAN ASSISTANT

## 2019-11-26 PROCEDURE — 93750 PR INTERROGATE VENT ASSIST DEV, IN PERSON, W PHYSICIAN ANALYSIS: ICD-10-PCS | Mod: NTX,,, | Performed by: INTERNAL MEDICINE

## 2019-11-26 PROCEDURE — 99292 PR CRITICAL CARE, ADDL 30 MIN: ICD-10-PCS | Mod: NTX,,, | Performed by: INTERNAL MEDICINE

## 2019-11-26 PROCEDURE — 99900035 HC TECH TIME PER 15 MIN (STAT): Mod: NTX

## 2019-11-26 PROCEDURE — 63600175 PHARM REV CODE 636 W HCPCS: Mod: NTX | Performed by: PHYSICIAN ASSISTANT

## 2019-11-26 PROCEDURE — 94761 N-INVAS EAR/PLS OXIMETRY MLT: CPT | Mod: NTX

## 2019-11-26 PROCEDURE — 99292 CRITICAL CARE ADDL 30 MIN: CPT | Mod: NTX,,, | Performed by: INTERNAL MEDICINE

## 2019-11-26 PROCEDURE — 97530 THERAPEUTIC ACTIVITIES: CPT | Mod: NTX

## 2019-11-26 PROCEDURE — 80048 BASIC METABOLIC PNL TOTAL CA: CPT | Mod: NTX

## 2019-11-26 PROCEDURE — 27000221 HC OXYGEN, UP TO 24 HOURS: Mod: NTX

## 2019-11-26 RX ORDER — LACTULOSE 10 G/15ML
20 SOLUTION ORAL ONCE
Status: COMPLETED | OUTPATIENT
Start: 2019-11-26 | End: 2019-11-26

## 2019-11-26 RX ORDER — MEXILETINE HYDROCHLORIDE 200 MG/1
200 CAPSULE ORAL EVERY 8 HOURS
Status: DISCONTINUED | OUTPATIENT
Start: 2019-11-26 | End: 2019-12-03

## 2019-11-26 RX ADMIN — SPIRONOLACTONE 12.5 MG: 25 TABLET, FILM COATED ORAL at 09:11

## 2019-11-26 RX ADMIN — MEXILETINE HYDROCHLORIDE 150 MG: 150 CAPSULE ORAL at 06:11

## 2019-11-26 RX ADMIN — WARFARIN SODIUM 3 MG: 2 TABLET ORAL at 05:11

## 2019-11-26 RX ADMIN — LISINOPRIL 10 MG: 10 TABLET ORAL at 09:11

## 2019-11-26 RX ADMIN — PANTOPRAZOLE SODIUM 40 MG: 40 TABLET, DELAYED RELEASE ORAL at 09:11

## 2019-11-26 RX ADMIN — AMIODARONE HYDROCHLORIDE 0.5 MG/MIN: 1.8 INJECTION, SOLUTION INTRAVENOUS at 04:11

## 2019-11-26 RX ADMIN — ASPIRIN 325 MG: 325 TABLET, DELAYED RELEASE ORAL at 09:11

## 2019-11-26 RX ADMIN — FUROSEMIDE 80 MG: 10 INJECTION, SOLUTION INTRAMUSCULAR; INTRAVENOUS at 09:11

## 2019-11-26 RX ADMIN — MIRTAZAPINE 15 MG: 15 TABLET, FILM COATED ORAL at 09:11

## 2019-11-26 RX ADMIN — MEXILETINE HYDROCHLORIDE 200 MG: 200 CAPSULE ORAL at 02:11

## 2019-11-26 RX ADMIN — GABAPENTIN 200 MG: 100 CAPSULE ORAL at 09:11

## 2019-11-26 RX ADMIN — LACTULOSE 20 G: 20 SOLUTION ORAL at 06:11

## 2019-11-26 RX ADMIN — ZOLPIDEM TARTRATE 5 MG: 5 TABLET ORAL at 09:11

## 2019-11-26 RX ADMIN — SENNOSIDES AND DOCUSATE SODIUM 1 TABLET: 8.6; 5 TABLET ORAL at 09:11

## 2019-11-26 RX ADMIN — ALPRAZOLAM 0.25 MG: 0.25 TABLET ORAL at 06:11

## 2019-11-26 RX ADMIN — AMIODARONE HYDROCHLORIDE 0.5 MG/MIN: 1.8 INJECTION, SOLUTION INTRAVENOUS at 09:11

## 2019-11-26 RX ADMIN — EPINEPHRINE 0.02 MCG/KG/MIN: 1 INJECTION PARENTERAL at 06:11

## 2019-11-26 RX ADMIN — MEXILETINE HYDROCHLORIDE 200 MG: 200 CAPSULE ORAL at 09:11

## 2019-11-26 RX ADMIN — MAGNESIUM OXIDE TAB 400 MG (241.3 MG ELEMENTAL MG) 400 MG: 400 (241.3 MG) TAB at 09:11

## 2019-11-26 RX ADMIN — VENLAFAXINE 150 MG: 37.5 TABLET ORAL at 09:11

## 2019-11-26 NOTE — LETTER
2019    RE: Deborah Navas          MRN  [id number]            1970             To Whom It May Concern:    Ms. Deborah Navas is a 49 y.o. year-old female patient at [facility] and was seen for consideration for cardiac transplantation.  She has a diagnosis of idiopathic cardiomyopathy and is s/p LVAD implant 09/10/19, and she is severely impaired in her exercise tolerance with a NYHA Functional Class IV and an ejection fraction of 25%. She is currently hospitalized in CMICU with recurrent VT with multiple ICD shocks. Due to her current acuity, she has been unable to complete a colonoscopy or updated mammogram.      We feel that with her current level of symptoms her mortality over the next several weeks is excessive.  Additionally, her functional impairment makes her quality of life extremely poor.  We feel that her only option at this time, after thorough review of all her findings, is cardiac transplantation.  Therefore, we request your URGENT review of this case and approval for funding of this procedure.    If we can be of any further assistance, please do not hesitate to contact us at the above address.    Sincerely,      Vi Bryant. MD  Medical Director, Pulmonary Hypertension Program

## 2019-11-26 NOTE — PROGRESS NOTES
11/26/2019  Terra Porter    Current provider:  Renetta Chaudhari MD      I, Terra Porter, rounded on Deborah Navas to ensure all mechanical assist device settings (IABP or VAD) were appropriate and all parameters were within limits.  I was able to ensure all back up equipment was present, the staff had no issues, and the Perfusion Department daily rounding was complete.    9:27 AM

## 2019-11-26 NOTE — PROGRESS NOTES
[facility]  Cardiac Electrophysiology  Progress Note    Admission Date: 11/13/2019  Code Status: Full Code   Attending Physician: Renetta Chaudhari MD   Expected Discharge Date: 11/29/2019  Principal Problem:Left ventricular assist device (LVAD) complication    Subjective:     Interval History: one episode of VT x 8 seconds treated with ATP.    Review of Systems   Constitution: Negative for chills, decreased appetite and diaphoresis.   HENT: Negative for congestion and ear discharge.    Eyes: Negative for blurred vision and discharge.   Cardiovascular: Negative for chest pain, dyspnea on exertion, irregular heartbeat, leg swelling and paroxysmal nocturnal dyspnea.   Respiratory: Negative for cough, hemoptysis and shortness of breath.    Gastrointestinal: Negative for abdominal pain.     Objective:     Vital Signs (Most Recent):  Temp: 98.1 °F (36.7 °C) (11/26/19 0701)  Pulse: 82 (11/26/19 0800)  Resp: 17 (11/26/19 0800)  BP: (!) 86/0 (11/26/19 0701)  SpO2: 99 % (11/26/19 0701) Vital Signs (24h Range):  Temp:  [97.9 °F (36.6 °C)-98.5 °F (36.9 °C)] 98.1 °F (36.7 °C)  Pulse:  [] 82  Resp:  [15-34] 17  SpO2:  [99 %-100 %] 99 %  BP: (62-86)/(0) 86/0     Weight: 102 kg (224 lb 13.9 oz)  Body mass index is 35.22 kg/m².     SpO2: 99 %  O2 Device (Oxygen Therapy): nasal cannula    Physical Exam   Constitutional: She is oriented to person, place, and time. She appears well-developed and well-nourished. No distress.   Eyes: Pupils are equal, round, and reactive to light. Conjunctivae are normal.   Neck: No tracheal deviation present. No thyromegaly present.   Cardiovascular: Normal rate, regular rhythm, normal heart sounds and intact distal pulses. Exam reveals no gallop and no friction rub.   No murmur heard.  Pulses:       Radial pulses are 2+ on the right side, and 2+ on the left side.        Femoral pulses are 2+ on the right side, and 2+ on the left side.  LVAD Hum   Pulmonary/Chest: Effort normal  and breath sounds normal. No respiratory distress. She has no wheezes. She has no rales.   Abdominal: Soft. Bowel sounds are normal. She exhibits no distension. There is no tenderness.   Musculoskeletal: She exhibits no edema or deformity.   Neurological: She is alert and oriented to person, place, and time. No cranial nerve deficit. Coordination normal.   Skin: Skin is warm and dry. She is not diaphoretic.   Psychiatric: She has a normal mood and affect. Her behavior is normal.       Significant Labs: All pertinent lab results from the last 24 hours have been reviewed.    Significant Imaging: Reviewed    Assessment and Plan:     Ventricular tachycardia  - Appropriate shocks after ATP failed to terminate MMVT  (1st episode), Appropriate shock after acceleration to VF zone with ATP (1st shock failed to terminate ->  2nd shock terminated)  - Continue amiodarone gtt for now  - If more VT with therapy consider IV Lidocaine (and stop Mexelitine). Increased Mexiletine to 200 mg TID  - Recommend weaning Epi gtt off when able  - Keep Mg >2 K>4        Jatinder Alves MD  Cardiac Electrophysiology  [facility]

## 2019-11-26 NOTE — PROGRESS NOTES
Follow up note:    to see pt for follow up.  Worker spoke with the pt alone, family is currently not in attendance.  Pt appears alert and oriented.  The pt reports it's possible she will not be discharged to home for sometime and there will be a team meeting today at 1:00 to discuss same.  Pt reports she want's to complete a POA and make sure her HCPA and LW are complete.  Pt is nervous about current medical situation and worker provided emotional support.   Transplant  will follow.

## 2019-11-26 NOTE — ASSESSMENT & PLAN NOTE
-hx low flow alarms prior to admit, last LFA 11/21  -Possibly secondary to ADHF--> RV failure  -Formal report of CT unremarkable; however, it was a non contrast as creat was elevated above baseline on admit and inflow and outflow cannulas not accurately visualized

## 2019-11-26 NOTE — SUBJECTIVE & OBJECTIVE
Interval History: One episode of NSVT overnight treated with ATP. Otherwise doing well, had BM this AM and feels much relief. Urgent selection today. GYN to complete breast exam this evening.     Continuous Infusions:   sodium chloride 0.9%      amiodarone in dextrose 5% 0.5 mg/min (11/26/19 1100)    epinephrine 0.02 mcg/kg/min (11/26/19 1100)    nitric oxide gas       Scheduled Meds:   aspirin  325 mg Oral Daily    furosemide  80 mg Intravenous BID    gabapentin  200 mg Oral BID    hepatitis A virus vaccine (PF)  1,440 Units Intramuscular Once    lisinopril  10 mg Oral BID    magnesium oxide  400 mg Oral Daily    mexiletine  200 mg Oral Q8H    mirtazapine  15 mg Oral QHS    pantoprazole  40 mg Oral Daily    polyethylene glycol  17 g Oral Daily    senna-docusate 8.6-50 mg  1 tablet Oral BID    spironolactone  12.5 mg Oral Daily    venlafaxine  150 mg Oral Daily    warfarin  3 mg Oral Daily     PRN Meds:sodium chloride 0.9%, ALPRAZolam, pneumoc 13-amber conj-dip cr(PF), promethazine, senna-docusate 8.6-50 mg, zolpidem    Review of patient's allergies indicates:   Allergen Reactions    Adhesive Blisters     Reaction to area in chest and up only    Codeine Itching     Objective:     Vital Signs (Most Recent):  Temp: 98.1 °F (36.7 °C) (11/26/19 0701)  Pulse: 80 (11/26/19 1100)  Resp: (!) 23 (11/26/19 1100)  BP: (!) 86/0 (11/26/19 0701)  SpO2: 99 % (11/26/19 0849) Vital Signs (24h Range):  Temp:  [97.9 °F (36.6 °C)-98.5 °F (36.9 °C)] 98.1 °F (36.7 °C)  Pulse:  [] 80  Resp:  [15-34] 23  SpO2:  [99 %-100 %] 99 %  BP: (62-86)/(0) 86/0     Patient Vitals for the past 72 hrs (Last 3 readings):   Weight   11/26/19 0500 102 kg (224 lb 13.9 oz)   11/25/19 1400 101.6 kg (224 lb)   11/25/19 1100 101.7 kg (224 lb 3.3 oz)     Body mass index is 35.22 kg/m².      Intake/Output Summary (Last 24 hours) at 11/26/2019 1132  Last data filed at 11/26/2019 1100  Gross per 24 hour   Intake 2196.87 ml   Output 4150 ml    Net -1953.13 ml     Hemodynamic Parameters:       Physical Exam   Constitutional: She is oriented to person, place, and time. She appears well-developed and well-nourished.   HENT:   Mouth/Throat: Oropharynx is clear and moist.   Eyes: EOM are normal.   Neck: No JVD present.   Cardiovascular: Normal rate, regular rhythm and intact distal pulses.   Smooth VAD hum   Pulmonary/Chest: Effort normal and breath sounds normal. No respiratory distress. She has no rales.   Abdominal: Soft. Bowel sounds are normal. She exhibits no distension. There is no tenderness. There is no guarding.   Musculoskeletal: She exhibits no edema.   Neurological: She is alert and oriented to person, place, and time.   Skin: Skin is warm.   Psychiatric: She has a normal mood and affect.   Nursing note and vitals reviewed.      Significant Labs:  CBC:  Recent Labs   Lab 11/24/19  0356 11/25/19  0357 11/26/19  0306   WBC 5.27 5.24 5.27   RBC 4.31 4.44 4.49   HGB 10.5* 11.0* 11.0*   HCT 37.1 38.2 38.5    309 326   MCV 86 86 86   MCH 24.4* 24.8* 24.5*   MCHC 28.3* 28.8* 28.6*     BNP:  Recent Labs   Lab 11/20/19  0606 11/22/19  0349 11/25/19  0357   * 176* 192*     CMP:  Recent Labs   Lab 11/20/19  0606  11/22/19  0349  11/25/19  0357 11/25/19  1145 11/26/19  0306   *   < > 132*   < > 140* 103 149*   CALCIUM 9.8   < > 9.3   < > 9.7 9.8 9.6   ALBUMIN 3.8  --  3.3*  --  3.5  --   --    PROT 7.9  --  6.9  --  7.0  --   --       < > 141   < > 141 141 139   K 3.5   < > 4.0   < > 4.0 3.7 4.4   CO2 25   < > 29   < > 29 32* 31*      < > 101   < > 100 99 99   BUN 31*   < > 37*   < > 31* 29* 32*   CREATININE 1.7*   < > 2.0*   < > 1.5* 1.5* 1.7*   ALKPHOS 97  --  92  --  90  --   --    ALT 15  --  18  --  15  --   --    AST 22  --  21  --  19  --   --    BILITOT 0.5  --  0.5  --  0.5  --   --     < > = values in this interval not displayed.      Coagulation:   Recent Labs   Lab 11/24/19  0356 11/25/19  0357 11/26/19  0306    INR 2.1* 1.9* 1.9*   APTT 33.1* 31.3 33.3*     LDH:  Recent Labs   Lab 11/24/19  0356 11/25/19  0357 11/26/19  0306    205 250     Microbiology:  Microbiology Results (last 7 days)     ** No results found for the last 168 hours. **          I have reviewed all pertinent labs within the past 24 hours.    Estimated Creatinine Clearance: 48.6 mL/min (A) (based on SCr of 1.7 mg/dL (H)).    Diagnostic Results:  I have reviewed and interpreted all pertinent imaging results/findings within the past 24 hours.

## 2019-11-26 NOTE — ASSESSMENT & PLAN NOTE
- Medtronic ICD  - Shocked x 3 11/25 for MMVT   - continues on amio ggt s/p amio load and increased mexiltine to 200 per EP. Still following, appreciate assistance   - See above VT

## 2019-11-26 NOTE — ASSESSMENT & PLAN NOTE
-NICM  -Last 2D Echo 11/25/19. Severe TR, IVC 8, normal RV function?, LV/RV dimensions 4.5/3.9 @ 5300. Dr arroyo to review  -lasix 80 IV BID currently   - now on EPI .02 and NO @ 10 and SvO2 61   - CVP 8 this AM  - GDMT: Continue ACE 10 BID, aldactone 12.5   - was on lasix 20 PRN at home PTA  -2g Na dietary restriction, 1500 mL fluid restriction, strict I/Os

## 2019-11-26 NOTE — ASSESSMENT & PLAN NOTE
- Appropriate shocks after ATP failed to terminate MMVT  (1st episode), Appropriate shock after acceleration to VF zone with ATP (1st shock failed to terminate ->  2nd shock terminated)  - Continue amiodarone gtt for now  - If more VT with therapy consider IV Lidocaine (and stop Mexelitine). Increased Mexiletine to 200 mg TID  - Recommend weaning Epi gtt off when able  - Keep Mg >2 K>4

## 2019-11-26 NOTE — CARE UPDATE
Brief HTS Update Note    Patient still has not had BM - avoiding enema to prevent straining. S/p suppository this evening. Will increase scheduled senna/docusate and give 1 time additional dose miralax.    Abhijit Ordaz MD  PGY-4, Cardiology  Pager [phone]

## 2019-11-26 NOTE — ASSESSMENT & PLAN NOTE
-HeartMate 3 Implanted 9/10/19 as DT.  -Implanted by Dr. Walden  -INR sub therapeutic. Goal INR 2.0-3.0.Boost Coumadin.   - Previous home dose was 3mg on Wed and 1.5mg QDaily  -Antiplatelets:  mg.  -LDH is stable Will monitor daily.   -Speed set at 5200. Decreased 11/25 in setting of Vt with ICD shocks associated with valsalva and small LV  -Not listed for OHTx: was declined due to pulmonary hypertension and incomplete care giving plan. Patient now has care giving plan. 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. Urgent selection 11/26.    Procedure: Device Interrogation Including analysis of device parameters  Current Settings: Ventricular Assist Device  Review of device function is stable    TXP LVAD INTERROGATIONS 11/26/2019 11/26/2019 11/26/2019 11/26/2019 11/26/2019 11/26/2019 11/26/2019   Type - - - - HeartMate3 - -   Flow 3.8 4.2 3.9 4.0 3.8 4.4 3.9   Speed 5200 5200 5200 5200 5200 5200 5200   PI 3.8 3.0 4.6 3.9 4.3 4.6 3.9   Power (Orellana) 3.7 3.6 3.7 3.7 3.6 3.6 3.6   LSL 4800 4800 4800 4800 4800 - -   Pulsatility Intermittent pulse Intermittent pulse Intermittent pulse Intermittent pulse Intermittent pulse - -

## 2019-11-26 NOTE — NURSING
Pt sitting up in bed, sister at bedside. Patient aware of OHT listing after selection today. Informed pt that MD and/or Transplant Coordinator will come to bedside to discuss with her.

## 2019-11-26 NOTE — PLAN OF CARE
No significant events overnight. HR stable and no shocks. No bowel movement (additional miralax, senna, and lactulose given overnight) CVP 10-11. POC to continue to aid in bowel movement and assess for further support. SvO2 61 this AM. POC discussed with pt in full detail with all questions and concerns addressed. WCTM.

## 2019-11-26 NOTE — PROGRESS NOTES
Follow up note:    to see pt for follow up. Pt's sister is currently in attendance.    Per pt request worker provided Living Will and [employer] paperwork.    Worker also provided phone numbers for [facility].    Worker provided emotional support.   Transplant  will follow.

## 2019-11-26 NOTE — PLAN OF CARE
Problem: Occupational Therapy Goal  Goal: Occupational Therapy Goal  Description  Goals to be met by: 12/26/19     Patient will increase functional independence with ADLs by performing:    UE Dressing with Canadian.  LE Dressing with Canadian.  Grooming while standing at sink with Canadian.  Toileting from toilet with Canadian for hygiene and clothing management.   Bathing from  shower chair/bench with Canadian.  Increased functional strength to WFL for B UE.  Upper extremity exercise program x15 reps per handout, with independence.  Pt will increase FMC from fair to good.       Outcome: Ongoing, Progressing

## 2019-11-26 NOTE — CONSULTS
Consult Note  Gynecology    Consult Requested By: Julieth Ramos PA-C  Reason for Consult: Transplant Workup    SUBJECTIVE:     History of Present Illness:  Patient is a 49 y.o. with LVAD in place in the setting of chronic combined systolic and diastolic HF currently being evaluated for heart transplant. The patient was seen on 2019 by the GYN team for well woman exam and full workup. At that time, recommendation was made for mammogram for routine breast cancer screening. However, patient has been unable to have MMG completed 2/2 acute inpatient medical needs; GYN is now re-consulted for interval clinical breast exam. The patient denies any changes to her GYN history since exam in 2019. Denies any breast abnormalities, masses, skin changes, or abnormal nipple discharge.    Prior full HPI on 2019 by Dr. Huang:  Patient is postmenopausal, no regular GYN; [facility] She has no gynecologic complaints at this time. Denies vaginal bleeding, abnormal discharge, difficulty with urination, incontinence, breast masses, skin changes, or nipple discharge.      OB History:       GYN History:  Menopause at age 44, LMP , when not on hormonal contraception, prior cycles regular, occurred monthly, lasting 4-6 days with normal flow, and significant pain  Last pap   Denies h/o abnormal paps or excisional procedures  Not sexually active  Denies h/o STDs  Last MM  Denies h/o abnormal MMG     Family History:  No family history of breast, uterine, or ovarian cancer.    PMHx:   Past Medical History:   Diagnosis Date    Fractures     History of ventricular fibrillation 2019    History of ventricular tachycardia 2019    Hyperlipidemia     Migraine     Osteoarthritis        PSHx:   Past Surgical History:   Procedure Laterality Date    BACK SURGERY      2007    BONE GRAFT Left     from Left hip to Left FA    eardrum reconstruction      ELBOW SURGERY Left  [id number]    FOREARM FRACTURE SURGERY Bilateral [id number]    multiple surgeries    INSERTION OF GRAFT TO PERICARDIUM  9/10/2019    Procedure: INSERTION, GRAFT, PERICARDIUM;  Surgeon: Shar Walden MD;  Location: [facility] OR [address] [place] [address];  Service: Cardiovascular;;    INSERTION OF IMPLANTABLE CARDIOVERTER-DEFIBRILLATOR (ICD) GENERATOR WITH TWO EXISTING LEADS      INSERTION OF PACEMAKER Left     LEFT VENTRICULAR ASSIST DEVICE N/A 9/10/2019    Procedure: INSERTION-LEFT VENTRICULAR ASSIST DEVICE;  Surgeon: Shar Walden MD;  [facility];  Service: Cardiovascular;  Laterality: N/A;  DT HM3     LUMBAR FUSION  2007    L4-L5    RIGHT HEART CATHETERIZATION Right 9/4/2019    Procedure: INSERTION, CATHETER, RIGHT HEART;  Surgeon: Vi Bryant MD;  Location: [facility] CATH LAB;  Service: Cardiology;  Laterality: Right;    RIGHT HEART CATHETERIZATION N/A 11/18/2019    Procedure: INSERTION, CATHETER, RIGHT HEART;  Surgeon: Eric Antunez Jr., MD;  Location: [facility] CATH LAB;  Service: Cardiology;  Laterality: N/A;    SINUS SURGERY Right 1994    with lymph nodes    STERNAL WOUND CLOSURE  9/10/2019    Procedure: CLOSURE, WOUND, STERNUM;  Surgeon: Shar Walden MD;  [facility];  Service: Cardiovascular;;    TEMPOROMANDIBULAR JOINT SURGERY Right 1988    TONSILLECTOMY      TREATMENT OF CARDIAC ARRHYTHMIA N/A 9/24/2019    Procedure: CARDIOVERSION;  Surgeon: Moe Barrera MD;  Location: [facility] EP LAB;  Service: Cardiology;  Laterality: N/A;  AF, DCCV/NADINE, anes, DM, Rm 3073    TYMPANOSTOMY TUBE PLACEMENT  1971- 1979    multiple tube placements       All:   Review of patient's allergies indicates:   Allergen Reactions    Adhesive Blisters     Reaction to area in chest and up only    Codeine Itching       Meds:   Medications Prior to Admission   Medication Sig Dispense Refill Last Dose    ALPRAZolam (XANAX) 0.25 MG tablet Take 1 tablet (0.25 mg total) by mouth 2 (two) times daily as needed for  Anxiety. 60 tablet 3 Past Month at Unknown time    amiodarone (PACERONE) 200 MG Tab Take 2 tablets (400mg) by mouth twice daily for 14 days then take 1 tablet (400mg) by mouth daily 72 tablet 11 11/13/2019 at Unknown time    GABAPENTIN ORAL Take 200 mg by mouth 2 (two) times daily.   11/13/2019 at Unknown time    lisinopril 10 MG tablet Take 1 tablet (10 mg total) by mouth 2 (two) times daily. 180 tablet 3 11/13/2019 at Unknown time    magnesium oxide (MAG-OX) 400 mg (241.3 mg magnesium) tablet Take 1 tablet (400 mg total) by mouth once daily. 30 tablet 11 11/13/2019 at Unknown time    mexiletine (MEXITIL) 150 MG Cap Take 1 capsule (150 mg total) by mouth every 8 (eight) hours. 90 capsule 11 11/13/2019 at Unknown time    oxyCODONE-acetaminophen (PERCOCET) 5-325 mg per tablet Take 1 tablet by mouth once daily.   Past Month at Unknown time    VENLAFAXINE HCL (EFFEXOR ORAL) Take 150 mg by mouth once daily.   11/13/2019 at Unknown time    warfarin (COUMADIN) 3 MG tablet Take 3mg orally daily every Mon/Wed/Fri and 1.5mg orally on all other days 45 tablet 11 11/12/2019 at Unknown time    zolpidem (AMBIEN) 5 MG Tab Take 5 mg by mouth nightly as needed.   11/12/2019 at Unknown time    FLUZONE QUAD [id number], PF, 60 mcg (15 mcg x 4)/0.5 mL Syrg TO BE ADMINISTERED BY PHARMACIST FOR IMMUNIZATION  0 Unknown at Unknown time    furosemide (LASIX) 20 MG tablet Use as directed for weight gain >3 lbs 30 tablet 11 Unknown at Unknown time    senna-docusate 8.6-50 mg (PERICOLACE) 8.6-50 mg per tablet Take 2 tablets by mouth 2 (two) times daily as needed for Constipation.   Unknown at Unknown time       SH:   Social History     Socioeconomic History    Marital status: Single     Spouse name: Not on file    Number of children: Not on file    Years of education: Not on file    Highest education level: Not on file   Occupational History    Not on file   Social Needs    Financial resource strain: Not on file    Food  insecurity:     Worry: Not on file     Inability: Not on file    Transportation needs:     Medical: Not on file     Non-medical: Not on file   Tobacco Use    Smoking status: Never Smoker    Smokeless tobacco: Never Used   Substance and Sexual Activity    Alcohol use: No    Drug use: No    Sexual activity: Not Currently   Lifestyle    Physical activity:     Days per week: Not on file     Minutes per session: Not on file    Stress: Not on file   Relationships    Social connections:     Talks on phone: Not on file     Gets together: Not on file     Attends Denominational service: Not on file     Active member of club or organization: Not on file     Attends meetings of clubs or organizations: Not on file     Relationship status: Not on file   Other Topics Concern    Not on file   Social History Narrative    Not on file       FH:   Family History   Problem Relation Age of Onset    Osteoarthritis Mother     Migraines Mother     Osteoarthritis Maternal Grandmother     Migraines Maternal Grandmother     Broken bones Maternal Grandmother     Osteoporosis Maternal Grandmother     Dislocations Maternal Grandmother     Scoliosis Maternal Grandmother     Osteoarthritis Paternal Grandmother     Cancer Paternal Grandmother         leukemia    Migraines Paternal Grandmother     Obesity Paternal Grandmother     Osteoarthritis Paternal Grandfather     Heart failure Paternal Grandfather     Migraines Paternal Grandfather     Heart failure Maternal Grandfather     Migraines Maternal Grandfather     Osteoarthritis Maternal Grandfather     Stroke Father     Osteoarthritis Father      Review of Systems:  Constitutional: no fever or chills  Respiratory: no cough or shortness of breath  Cardiovascular: no chest pain or palpitations  Gastrointestinal: no nausea or vomiting, tolerating diet, negative for change in bowel habits  Genitourinary: no hematuria or dysuria, negative for urinary incontinence, no vaginal  discharge or bleeding  Integument/Breast: no rash or pruritis, negative for breast lump, nipple discharge and skin lesion(s)     OBJECTIVE:     Temp:  [98.1 °F (36.7 °C)-99 °F (37.2 °C)] 98.3 °F (36.8 °C)  Pulse:  [] 80  Resp:  [16-35] 25  SpO2:  [99 %-100 %] 100 %  BP: (76-86)/(0) 76/0    Physical Exam:  GEN: No apparent distress. Alert and oriented. Obese female.  NECK: No palpable masses or nodules.  BREASTS: No tenderness or palpable masses. Fibrocystic changes bilaterally. No skin changes or nipple discharge. No axillary lymphadenopathy.  ABDOMEN: Soft, non tender, non-distended. No guarding or rebound tenderness. Dry bandage in place in RLQ.    ASSESSMENT/PLAN:     Patient Active Problem List   Diagnosis    Knee pain    Pes anserine bursitis    ICD (implantable cardioverter-defibrillator) in place    Ventricular fibrillation    Acute on chronic combined systolic and diastolic heart failure    Congestive cardiomyopathy    History of ventricular fibrillation    History of ventricular tachycardia    Heart transplant candidate    Enlarged thyroid    Abnormal CT scan    CKD (chronic kidney disease)    Chronic systolic congestive heart failure    LVAD (left ventricular assist device) present    Pleural effusion    Anticoagulation monitoring, INR range 2-3    Long term (current) use of anticoagulants    Ventricular tachycardia    Essential hypertension    Left ventricular assist device (LVAD) complication    Organ transplant candidate    GLO (acute kidney injury)    RVF (right ventricular failure)       Gynecologic malignancy screening:   - Patient continues to have no acute GYN needs at this time; low risk for gynecologic malignancy  - Pap NILM and HPV negative on 9/9/2019; per screening guidelines for immunocompromised patients, would need annual pap smear with HPV co-testing   - MMG order in place; however, patient unable to have MMG completed 2/2 acute inpatient medical needs; breast  exam continues to be WNL as above  - Recommend yearly well woman exams on an outpatient basis upon hospital discharge    Thank you for the consult. Please call GYN with any questions.  Will sign off at this time.    Stefany Herrera M.D.  OB/GYN PGY-1    NORMAL CBE AND NO BREAST COMPLAINTS, BUT MAMMOGRAPHY IS INDICATED SCREENING MODALITY AND PREFERRED FOR BREAST CANCER SURVEILLANCE

## 2019-11-26 NOTE — ASSESSMENT & PLAN NOTE
- Per EP office visit on day of admission: She continues to have multiple VT episodes with some ATP therapy, although no ICD shocks since starting mexiletine 10/24/2019. One slow VT episode 2.5hrs 11/3/2019. Patient asymptomatic with LVAD. On coumadin. No AFL since post-op event (paced out of rhythm 9/24/2019). CHB with 100% V pacing (LV lead off).   - Plan was to continue current regimen despite Mexiletine making her nauseous.  - Decreased Amiodarone to daily per EP plan on discharge last hospitalization   - Shocked x 3 11/25 for MMVT   - continues on amio ggt s/p amio load and increased mexiltine to 200 per EP. Still following, appreciate assistance

## 2019-11-26 NOTE — PLAN OF CARE
No acute events throughout day, VS and assessment per flow sheet, patient progressing towards goals as tolerated. Patient with no ectopy this shift. Patient with BM send for occult blood stool this AM as part of transplant workup. Committee met today and patient medically approved for transplant, transplant coordinator working on financial clearance. Patient responding appropriately. Patient able to get hair washed, full bath and linen change. Good PO intake. Patient's sister present at bedside most of afternoon. Plan of care reviewed with Deborah Navas and family, all concerns addressed, will continue to monitor.

## 2019-11-26 NOTE — PT/OT/SLP EVAL
Occupational Therapy   Evaluation    Name: Deborah Navas  MRN: [id number]  Admitting Diagnosis:  Left ventricular assist device (LVAD) complication 8 Days Post-Op    Recommendations:     Discharge Recommendations: (TBD- pt is awaiting OHT)  Discharge Equipment Recommendations:  none  Barriers to discharge:  Inaccessible home environment    Assessment:     Deborah Navas is a 49 y.o. female with a medical diagnosis of Left ventricular assist device (LVAD) complication.  She was able to perform fine motor coordination activities and hand exercises 1x15 and tolerated well.  Pt noted to have VT and is on BR at this time.  Will assess ADL's further when pt is medically stable.  Performance deficits affecting function: weakness, impaired endurance, impaired self care skills, impaired functional mobilty, impaired cardiopulmonary response to activity, decreased coordination, decreased upper extremity function, decreased ROM, impaired fine motor.      Rehab Prognosis: Good; patient would benefit from acute skilled OT services to address these deficits and reach maximum level of function.       Plan:     Patient to be seen 3 x/week to address the above listed problems via self-care/home management, therapeutic activities, therapeutic exercises  · Plan of Care Expires: 12/26/19  · Plan of Care Reviewed with: patient    Subjective     Chief Complaint: Old LVAD now c VT and here for possible OHT  Patient/Family Comments/goals: To get better.    Occupational Profile:  Living Environment: Pt lives in an apartment complex and has 13 ZURI.  Has a tub/shower combo.  Previous level of function: I PTA  Equipment Used at Home:  none  Assistance upon Discharge: Pt has a caregiver that assists her.    Pain/Comfort:  · Pain Rating 1: 0/10    Patients cultural, spiritual, [employer] conflicts given the current situation: no    Objective:     Communicated with: RN prior to session.  Patient found supine with arterial line,  blood pressure cuff, parrish catheter, LVAD, oxygen, peripheral IV, pulse ox (continuous), telemetry(High flow O2) upon OT entry to room.    General Precautions: Standard, LVAD   Orthopedic Precautions:N/A   Braces: N/A     Occupational Performance:        Functional Mobility/Transfers:    · Functional Mobility: NT 2* BR d/t VT.        Cognitive/Visual Perceptual:  Cognitive/Psychosocial Skills:     -       Oriented to: Person, Place, Time and Situation   -       Follows Commands/attention:Follows multistep  commands    Physical Exam:  Upper Extremity Range of Motion:     -       Right Upper Extremity: WFL  -       Left Upper Extremity: WFL  Upper Extremity Strength:    -       Right Upper Extremity: WFL  -       Left Upper Extremity: WFL   Pt has fair FMC in B hands.  Pt states that R < L.    AMPAC 6 Click ADL:  AMPAC Total Score: 10    Treatment & Education:  Pt was able to perform FMC activities 1x15.  Pt was able to push down pen 1x5 c all digits.  Able to perform B hand AROM exercises 1x15 for all IP planes and tolerated well.  Pt reports having difficulty holding fork to feed herself and to zip up zippers and pt educated on adaptive techniques that can potentially help her.  Education:    Patient left supine with all lines intact, call button in reach and RN notified    GOALS:   Multidisciplinary Problems     Occupational Therapy Goals        Problem: Occupational Therapy Goal    Goal Priority Disciplines Outcome Interventions   Occupational Therapy Goal     OT, PT/OT Ongoing, Progressing    Description:  Goals to be met by: 12/26/19     Patient will increase functional independence with ADLs by performing:    UE Dressing with [place].  LE Dressing with [place].  Grooming while standing at sink with [place].  Toileting from toilet with [place] for hygiene and clothing management.   Bathing from  shower chair/bench with [place].  Increased functional strength to WFL for B UE.  Upper  extremity exercise program x15 reps per handout, with independence.  Pt will increase FMC from fair to good.                        History:     Past Medical History:   Diagnosis Date    Fractures     History of ventricular fibrillation 9/4/2019    History of ventricular tachycardia 9/4/2019    Hyperlipidemia     Migraine     Osteoarthritis        Past Surgical History:   Procedure Laterality Date    BACK SURGERY      2007    BONE GRAFT Left     from Left hip to Left FA    eardrum reconstruction  1980    ELBOW SURGERY Left [id number]    FOREARM FRACTURE SURGERY Bilateral [id number]    multiple surgeries    INSERTION OF GRAFT TO PERICARDIUM  9/10/2019    Procedure: INSERTION, GRAFT, PERICARDIUM;  Surgeon: Shar Walden MD;  Location: [facility] OR [address] [place] [address];  Service: Cardiovascular;;    INSERTION OF IMPLANTABLE CARDIOVERTER-DEFIBRILLATOR (ICD) GENERATOR WITH TWO EXISTING LEADS      INSERTION OF PACEMAKER Left     LEFT VENTRICULAR ASSIST DEVICE N/A 9/10/2019    Procedure: INSERTION-LEFT VENTRICULAR ASSIST DEVICE;  Surgeon: Shar Walden MD;  Location: [facility] OR [address] [place] [address];  Service: Cardiovascular;  Laterality: N/A;  DT HM3     LUMBAR FUSION  2007    L4-L5    RIGHT HEART CATHETERIZATION Right 9/4/2019    Procedure: INSERTION, CATHETER, RIGHT HEART;  Surgeon: Vi Bryant MD;  Location: [facility] CATH LAB;  Service: Cardiology;  Laterality: Right;    RIGHT HEART CATHETERIZATION N/A 11/18/2019    Procedure: INSERTION, CATHETER, RIGHT HEART;  Surgeon: Eric Antunez Jr., MD;  Location: [facility] CATH LAB;  Service: Cardiology;  Laterality: N/A;    SINUS SURGERY Right 1994    with lymph nodes    STERNAL WOUND CLOSURE  9/10/2019    Procedure: CLOSURE, WOUND, STERNUM;  Surgeon: Shar Walden MD;  Location: [facility] OR [address] [place] [address];  Service: Cardiovascular;;    TEMPOROMANDIBULAR JOINT SURGERY Right 1988    TONSILLECTOMY      TREATMENT OF CARDIAC ARRHYTHMIA N/A 9/24/2019    Procedure: CARDIOVERSION;  Surgeon: Moe ROBERTS  MD [place];  Location: [facility] EP LAB;  Service: Cardiology;  Laterality: N/A;  AF, DCCV/NADINE, anes, DM, Rm 3073    TYMPANOSTOMY TUBE PLACEMENT  1971- 1979    multiple tube placements       Time Tracking:     OT Date of Treatment: 11/26/19  OT Start Time: 1320  OT Stop Time: 1341  OT Total Time (min): 21 min    Billable Minutes:Evaluation 10  Therapeutic Activity 11    JAGJIT Cordero  11/26/2019

## 2019-11-26 NOTE — PROGRESS NOTES
PHARM.D. PRE-TRANSPLANT NOTE:    This patient's medication therapy was evaluated as part of her pre-transplant evaluation.      The following pharmacologic concerns were noted: restart mirtazepine and venlafaxine post transplant.       The following medications require consideration prior to HCV DAA therapy:   Amiodarone  Warfarin for LVAD can likely be discontinued post transplant  Proton pump inhibitor - pantoprazole      The following factors were assessed to determine risk of rejection:     Age: 49 y.o.  Gender: female  Race: White  Prior Pregnancy: none  cPRA current / peak within one year: 0/0    Deborah Hazel Yosef is considered low risk for rejection, and will require steroid induction. Consideration will also be given to HLA antigen match, presence of DSA, crossmatch and cPRA at the time of transplant.       I am available for consultation and can be contacted, as needed by the other members of the team.

## 2019-11-26 NOTE — COMMITTEE REVIEW
Native Organ Dx: idopathic cardiomyopathy     Approved  Deborah Navas's case was presented to the heart transplant selection committee on 11/26/19 .  Patient has been accepted as a candidate for heart transplantation due to idiopathic cardiomyopathy with an NYHA Functional Class IV-- 20% - Very sick, hospitalization necessary: Active treatment necessary.  Patient to be listed through [facility] pending financial clearance as a Status 2 at donor weight 30% down.    Note was written by Milady Vidal.    ==========================================================

## 2019-11-26 NOTE — SUBJECTIVE & OBJECTIVE
Interval History: one episode of VT x 8 seconds treated with ATP.    Review of Systems   Constitution: Negative for chills, decreased appetite and diaphoresis.   HENT: Negative for congestion and ear discharge.    Eyes: Negative for blurred vision and discharge.   Cardiovascular: Negative for chest pain, dyspnea on exertion, irregular heartbeat, leg swelling and paroxysmal nocturnal dyspnea.   Respiratory: Negative for cough, hemoptysis and shortness of breath.    Gastrointestinal: Negative for abdominal pain.     Objective:     Vital Signs (Most Recent):  Temp: 98.1 °F (36.7 °C) (11/26/19 0701)  Pulse: 82 (11/26/19 0800)  Resp: 17 (11/26/19 0800)  BP: (!) 86/0 (11/26/19 0701)  SpO2: 99 % (11/26/19 0701) Vital Signs (24h Range):  Temp:  [97.9 °F (36.6 °C)-98.5 °F (36.9 °C)] 98.1 °F (36.7 °C)  Pulse:  [] 82  Resp:  [15-34] 17  SpO2:  [99 %-100 %] 99 %  BP: (62-86)/(0) 86/0     Weight: 102 kg (224 lb 13.9 oz)  Body mass index is 35.22 kg/m².     SpO2: 99 %  O2 Device (Oxygen Therapy): nasal cannula    Physical Exam   Constitutional: She is oriented to person, place, and time. She appears well-developed and well-nourished. No distress.   Eyes: Pupils are equal, round, and reactive to light. Conjunctivae are normal.   Neck: No tracheal deviation present. No thyromegaly present.   Cardiovascular: Normal rate, regular rhythm, normal heart sounds and intact distal pulses. Exam reveals no gallop and no friction rub.   No murmur heard.  Pulses:       Radial pulses are 2+ on the right side, and 2+ on the left side.        Femoral pulses are 2+ on the right side, and 2+ on the left side.  LVAD Hum   Pulmonary/Chest: Effort normal and breath sounds normal. No respiratory distress. She has no wheezes. She has no rales.   Abdominal: Soft. Bowel sounds are normal. She exhibits no distension. There is no tenderness.   Musculoskeletal: She exhibits no edema or deformity.   Neurological: She is alert and oriented to person,  place, and time. No cranial nerve deficit. Coordination normal.   Skin: Skin is warm and dry. She is not diaphoretic.   Psychiatric: She has a normal mood and affect. Her behavior is normal.       Significant Labs: All pertinent lab results from the last 24 hours have been reviewed.    Significant Imaging: Reviewed

## 2019-11-26 NOTE — PROGRESS NOTES
[facility]  Heart Transplant  Progress Note    Patient Name: Deborah Navas  MRN: [id number]  Admission Date: 11/13/2019  Hospital Length of Stay: 11 days  Attending Physician: Renetta Chaudhari MD  Primary Care Provider: Primary Doctor No  Principal Problem:Left ventricular assist device (LVAD) complication    Subjective:     Interval History: One episode of NSVT overnight treated with ATP. Otherwise doing well, had BM this AM and feels much relief. Urgent selection today. GYN to complete breast exam this evening.     Continuous Infusions:   sodium chloride 0.9%      amiodarone in dextrose 5% 0.5 mg/min (11/26/19 1100)    epinephrine 0.02 mcg/kg/min (11/26/19 1100)    nitric oxide gas       Scheduled Meds:   aspirin  325 mg Oral Daily    furosemide  80 mg Intravenous BID    gabapentin  200 mg Oral BID    hepatitis A virus vaccine (PF)  1,440 Units Intramuscular Once    lisinopril  10 mg Oral BID    magnesium oxide  400 mg Oral Daily    mexiletine  200 mg Oral Q8H    mirtazapine  15 mg Oral QHS    pantoprazole  40 mg Oral Daily    polyethylene glycol  17 g Oral Daily    senna-docusate 8.6-50 mg  1 tablet Oral BID    spironolactone  12.5 mg Oral Daily    venlafaxine  150 mg Oral Daily    warfarin  3 mg Oral Daily     PRN Meds:sodium chloride 0.9%, ALPRAZolam, pneumoc 13-amber conj-dip cr(PF), promethazine, senna-docusate 8.6-50 mg, zolpidem    Review of patient's allergies indicates:   Allergen Reactions    Adhesive Blisters     Reaction to area in chest and up only    Codeine Itching     Objective:     Vital Signs (Most Recent):  Temp: 98.1 °F (36.7 °C) (11/26/19 0701)  Pulse: 80 (11/26/19 1100)  Resp: (!) 23 (11/26/19 1100)  BP: (!) 86/0 (11/26/19 0701)  SpO2: 99 % (11/26/19 0849) Vital Signs (24h Range):  Temp:  [97.9 °F (36.6 °C)-98.5 °F (36.9 °C)] 98.1 °F (36.7 °C)  Pulse:  [] 80  Resp:  [15-34] 23  SpO2:  [99 %-100 %] 99 %  BP: (62-86)/(0) 86/0     Patient Vitals  for the past 72 hrs (Last 3 readings):   Weight   11/26/19 0500 102 kg (224 lb 13.9 oz)   11/25/19 1400 101.6 kg (224 lb)   11/25/19 1100 101.7 kg (224 lb 3.3 oz)     Body mass index is 35.22 kg/m².      Intake/Output Summary (Last 24 hours) at 11/26/2019 1132  Last data filed at 11/26/2019 1100  Gross per 24 hour   Intake 2196.87 ml   Output 4150 ml   Net -1953.13 ml     Hemodynamic Parameters:       Physical Exam   Constitutional: She is oriented to person, place, and time. She appears well-developed and well-nourished.   HENT:   Mouth/Throat: Oropharynx is clear and moist.   Eyes: EOM are normal.   Neck: No JVD present.   Cardiovascular: Normal rate, regular rhythm and intact distal pulses.   Smooth VAD hum   Pulmonary/Chest: Effort normal and breath sounds normal. No respiratory distress. She has no rales.   Abdominal: Soft. Bowel sounds are normal. She exhibits no distension. There is no tenderness. There is no guarding.   Musculoskeletal: She exhibits no edema.   Neurological: She is alert and oriented to person, place, and time.   Skin: Skin is warm.   Psychiatric: She has a normal mood and affect.   Nursing note and vitals reviewed.      Significant Labs:  CBC:  Recent Labs   Lab 11/24/19  0356 11/25/19  0357 11/26/19  0306   WBC 5.27 5.24 5.27   RBC 4.31 4.44 4.49   HGB 10.5* 11.0* 11.0*   HCT 37.1 38.2 38.5    309 326   MCV 86 86 86   MCH 24.4* 24.8* 24.5*   MCHC 28.3* 28.8* 28.6*     BNP:  Recent Labs   Lab 11/20/19  0606 11/22/19  0349 11/25/19  0357   * 176* 192*     CMP:  Recent Labs   Lab 11/20/19  0606  11/22/19  0349  11/25/19  0357 11/25/19  1145 11/26/19  0306   *   < > 132*   < > 140* 103 149*   CALCIUM 9.8   < > 9.3   < > 9.7 9.8 9.6   ALBUMIN 3.8  --  3.3*  --  3.5  --   --    PROT 7.9  --  6.9  --  7.0  --   --       < > 141   < > 141 141 139   K 3.5   < > 4.0   < > 4.0 3.7 4.4   CO2 25   < > 29   < > 29 32* 31*      < > 101   < > 100 99 99   BUN 31*   < > 37*    < > 31* 29* 32*   CREATININE 1.7*   < > 2.0*   < > 1.5* 1.5* 1.7*   ALKPHOS 97  --  92  --  90  --   --    ALT 15  --  18  --  15  --   --    AST 22  --  21  --  19  --   --    BILITOT 0.5  --  0.5  --  0.5  --   --     < > = values in this interval not displayed.      Coagulation:   Recent Labs   Lab 11/24/19  0356 11/25/19 0357 11/26/19  0306   INR 2.1* 1.9* 1.9*   APTT 33.1* 31.3 33.3*     LDH:  Recent Labs   Lab 11/24/19 0356 11/25/19 0357 11/26/19  0306    205 250     Microbiology:  Microbiology Results (last 7 days)     ** No results found for the last 168 hours. **          I have reviewed all pertinent labs within the past 24 hours.    Estimated Creatinine Clearance: 48.6 mL/min (A) (based on SCr of 1.7 mg/dL (H)).    Diagnostic Results:  I have reviewed and interpreted all pertinent imaging results/findings within the past 24 hours.    Assessment and Plan:     50 yo WF with NICM, s/p HM3 implantation 9/10/19 (post up coarse uncomplicated with the exception of+ diaphragmatic stimulation of LV lead and pleural effusion with chest tube placement, atrial flutter with NADINE/DCCV), V-fib reported in setting of hypokalemia with appropriate shock from ICD on Amiodarone prior to LVAD placement; and recent hospitalizations for ventricular arrythmias; started on Mexilitine.  She was seen in EP clinic today and she continues to have multiple VT episodes with some ATP therapy, although no ICD shocks since starting mexiletine 10/24/2019. One slow VT episode 2.5hrs 11/3/2019. Patient asymptomatic with LVAD. On coumadin. No AFL since post-op event (paced out of rhythm 9/24/2019). CHB with 100% V pacing (LV lead off). She does not feel well. Dry heaving with mexiletine. Taking phenergan PRN. 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   She was seen in HTS clinic and reported 4 low flow alarms the last 4 nights.  She reports they occur in her sleep and last for only a few seconds.   By the time she wakes up; they quickly stop.  She does report to possibly being little volume overloaded.  She hasn't noticed weight gain at home but thinks she may have little extra fluid.  She denies SOB, chest pain, palpitations, LEGER. In review of her records: she was 223lbs on 10/24/19 during previous hospitalization and is 235lbs now.  Her lasix had previously been decreased to prn.     * Left ventricular assist device (LVAD) complication  -hx low flow alarms prior to admit, last LFA 11/21  -Possibly secondary to ADHF--> RV failure  -Formal report of CT unremarkable; however, it was a non contrast as creat was elevated above baseline on admit and inflow and outflow cannulas not accurately visualized     Organ transplant candidate  - will attempt to list with exemption due to VT with ICD shocks and RV failure (will not tolerate )    Essential hypertension  -Patient is non-pulsatile  -Doppler goal 60-90 mmHg  -Antihypertensive medications include  Lisinopril  -Hydralazine was started on evening of admit and then changed to low dose Norvasc and Spironolactone to aid with BP and low K+.  Hesitated on increasing Lisinopril because of active diuresis and concern for fluctuation of creatinine    - amlodipine now on hold in setting of symptomatic hypotension and LFAs on 11/16      Ventricular tachycardia  - Per EP office visit on day of admission: She continues to have multiple VT episodes with some ATP therapy, although no ICD shocks since starting mexiletine 10/24/2019. One slow VT episode 2.5hrs 11/3/2019. Patient asymptomatic with LVAD. On coumadin. No AFL since post-op event (paced out of rhythm 9/24/2019). CHB with 100% V pacing (LV lead off).   - Plan was to continue current regimen despite Mexiletine making her nauseous.  - Decreased Amiodarone to daily per EP plan on discharge last hospitalization   - Shocked x 3 11/25 for MMVT   - continues on amio ggt s/p amio load and increased mexiltine to 200 per EP.  Still following, appreciate assistance     LVAD (left ventricular assist device) present  -HeartMate 3 Implanted 9/10/19 as DT.  -Implanted by Dr. Walden  -INR sub therapeutic. Goal INR 2.0-3.0.Boost Coumadin.   - Previous home dose was 3mg on Wed and 1.5mg QDaily  -Antiplatelets:  mg.  -LDH is stable Will monitor daily.   -Speed set at 5200. Decreased 11/25 in setting of Vt with ICD shocks associated with valsalva and small LV  -Not listed for OHTx: was declined due to pulmonary hypertension and incomplete care giving plan. Patient now has care giving plan. 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. Urgent selection 11/26.    Procedure: Device Interrogation Including analysis of device parameters  Current Settings: Ventricular Assist Device  Review of device function is stable    TXP LVAD INTERROGATIONS 11/26/2019 11/26/2019 11/26/2019 11/26/2019 11/26/2019 11/26/2019 11/26/2019   Type - - - - HeartMate3 - -   Flow 3.8 4.2 3.9 4.0 3.8 4.4 3.9   Speed 5200 5200 5200 5200 5200 5200 5200   PI 3.8 3.0 4.6 3.9 4.3 4.6 3.9   Power ([place]) 3.7 3.6 3.7 3.7 3.6 3.6 3.6   LSL 4800 4800 4800 4800 4800 - -   Pulsatility Intermittent pulse Intermittent pulse Intermittent pulse Intermittent pulse Intermittent pulse - -       CKD (chronic kidney disease)  - Baseline creat appears 1.3-1.6  - Will monitor with diuresis     Acute on chronic combined systolic and diastolic heart failure  -NICM  -Last 2D Echo 11/25/19. Severe TR, IVC 8, normal RV function?, LV/RV dimensions 4.5/3.9 @ 5300. Dr arroyo to review  -lasix 80 IV BID currently   - now on EPI .02 and NO @ 10 and SvO2 61   - CVP 8 this AM  - GDMT: Continue ACE 10 BID, aldactone 12.5   - was on lasix 20 PRN at home PTA  -2g Na dietary restriction, 1500 mL fluid restriction, strict I/Os      ICD (implantable cardioverter-defibrillator) in place  - [employer]onic ICD  - Shocked x 3 11/25 for MMVT   - continues on amio ggt s/p amio load and increased mexiltine to  200 per EP. Still following, appreciate assistance   - See above VT        Julieth Ramos PA-C  Heart Transplant  [facility]    Uninterrupted Critical Care/Counseling Time (not including procedures): 45  minutes

## 2019-11-26 NOTE — PROGRESS NOTES
Pt discussed at urgent transplant selection today-    Briefly, this is a 48 yo woman with  NICM, s/p HM3 implantation 9/10/19 (post up coarse uncomplicated with the exception of+ diaphragmatic stimulation of LV lead and pleural effusion with chest tube placement, atrial flutter with NADINE/DCCV), V-fib reported in setting of hypokalemia with appropriate shock from ICD on Amiodarone prior to LVAD placement; and recent hospitalizations for ventricular arrythmias; started on Mexilitine.  She was seen on day of admission (11/13/19)  in EP clin and was found to have multiple VT episodes requiring ATP therapy, including one slow VT episode lasting 2.5hrs 11/3/2019. 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     She was then seen in heart transplant clinic on the same day and reported 4 low flow alarms the last 4 nights. In review of her records: she was 223lbs on 10/24/19 during previous hospitalization and was 235lbs on the day of admit, along with volume overload on exam, increased BNP, c/w acute on chronic HF.  Pt was admitted, lasted with IV lasix,   However she cont to have low flow alarms- CT was done to assess canula position and was unremarkable. Though BP was elevated at the time of admission, LFA cont despite adequate BP control.          RHC was performed 11/18, demonstrated increased right and left-sided filling pressures ( RA (19)  PCWP (20), with improved PAP compared to pre-VAD (35/16) but still with low CO/CI (3.79/1.77)-  Diuresis with IV lasix was continued, with improvement in HF sx, however she cont to have LFA and NSVT. She was subsequently transferred to the ICU on the evening of 11/20 for RV Failure as started on epi, Simon as her CVP was 15. She continued to diurese on lasix gtt with improvement in her LFA, however despite aggressive electrolyte repletion she cont to have VT requiring ATP and on 11/25 she was shocked 3 times by her ICD ( K 4.2 and Mg 2.2). She was  started back on amiodarone gtt per EP recs.    Unfortunately, as above, there are no other options to manage her ventricular arrhythmias as above, and because of these arrhythmias, we are unable to support pt with inotropes longterm for her RV failure. She remains in the ICU on low dose epi (which she is marginally tolerating), and Simon, PAC was not left in again because of concerns of VT, however we are using her central line to follow both filling pressures and calculate cardiac outputs.  We see no option for this patient other than urgent transplantation, and will apply for a status 2 exception.

## 2019-11-27 LAB
ALBUMIN SERPL BCP-MCNC: 3.4 G/DL (ref 3.5–5.2)
ALLENS TEST: ABNORMAL
ALP SERPL-CCNC: 94 U/L (ref 55–135)
ALT SERPL W/O P-5'-P-CCNC: 16 U/L (ref 10–44)
ANION GAP SERPL CALC-SCNC: 10 MMOL/L (ref 8–16)
APTT BLDCRRT: 33 SEC (ref 21–32)
ASCENDING AORTA: 3.26 CM
AST SERPL-CCNC: 21 U/L (ref 10–40)
BASOPHILS # BLD AUTO: 0.06 K/UL (ref 0–0.2)
BASOPHILS NFR BLD: 1.2 % (ref 0–1.9)
BILIRUB DIRECT SERPL-MCNC: 0.2 MG/DL (ref 0.1–0.3)
BILIRUB SERPL-MCNC: 0.3 MG/DL (ref 0.1–1)
BNP SERPL-MCNC: 217 PG/ML (ref 0–99)
BSA FOR ECHO PROCEDURE: 2.2 M2
BUN SERPL-MCNC: 29 MG/DL (ref 6–20)
CALCIUM SERPL-MCNC: 9.6 MG/DL (ref 8.7–10.5)
CHLORIDE SERPL-SCNC: 101 MMOL/L (ref 95–110)
CO2 SERPL-SCNC: 29 MMOL/L (ref 23–29)
CREAT SERPL-MCNC: 1.7 MG/DL (ref 0.5–1.4)
CRP SERPL-MCNC: 13.2 MG/L (ref 0–8.2)
CV ECHO LV RWT: 0.26 CM
DELSYS: ABNORMAL
DIFFERENTIAL METHOD: ABNORMAL
DOP CALC LVOT AREA: 3.1 CM2
DOP CALC LVOT DIAMETER: 2 CM
ECHO LV POSTERIOR WALL: 0.7 CM (ref 0.6–1.1)
EOSINOPHIL # BLD AUTO: 0.2 K/UL (ref 0–0.5)
EOSINOPHIL NFR BLD: 3.3 % (ref 0–8)
ERYTHROCYTE [DISTWIDTH] IN BLOOD BY AUTOMATED COUNT: 16.6 % (ref 11.5–14.5)
EST. GFR  (AFRICAN AMERICAN): 40 ML/MIN/1.73 M^2
EST. GFR  (NON AFRICAN AMERICAN): 34.7 ML/MIN/1.73 M^2
FRACTIONAL SHORTENING: 19 % (ref 28–44)
GLUCOSE SERPL-MCNC: 149 MG/DL (ref 70–110)
HCO3 UR-SCNC: 32 MMOL/L (ref 24–28)
HCT VFR BLD AUTO: 38.5 % (ref 37–48.5)
HGB BLD-MCNC: 11 G/DL (ref 12–16)
IMM GRANULOCYTES # BLD AUTO: 0.02 K/UL (ref 0–0.04)
IMM GRANULOCYTES NFR BLD AUTO: 0.4 % (ref 0–0.5)
INR PPP: 1.9 (ref 0.8–1.2)
INTERVENTRICULAR SEPTUM: 0.75 CM (ref 0.6–1.1)
LA MAJOR: 5.23 CM
LA WIDTH: 2.36 CM
LDH SERPL L TO P-CCNC: 207 U/L (ref 110–260)
LEFT ATRIUM SIZE: 2.23 CM
LEFT INTERNAL DIMENSION IN SYSTOLE: 4.3 CM (ref 2.1–4)
LEFT VENTRICLE DIASTOLIC VOLUME INDEX: 72.59 ML/M2
LEFT VENTRICLE DIASTOLIC VOLUME: 154.32 ML
LEFT VENTRICLE MASS INDEX: 62 G/M2
LEFT VENTRICLE SYSTOLIC VOLUME INDEX: 39.1 ML/M2
LEFT VENTRICLE SYSTOLIC VOLUME: 83.08 ML
LEFT VENTRICULAR INTERNAL DIMENSION IN DIASTOLE: 5.3 CM (ref 3.5–6)
LEFT VENTRICULAR MASS: 132.61 G
LV LATERAL E/E' RATIO: 17.43 M/S
LYMPHOCYTES # BLD AUTO: 0.8 K/UL (ref 1–4.8)
LYMPHOCYTES NFR BLD: 15.8 % (ref 18–48)
MAGNESIUM SERPL-MCNC: 2.3 MG/DL (ref 1.6–2.6)
MCH RBC QN AUTO: 24.3 PG (ref 27–31)
MCHC RBC AUTO-ENTMCNC: 28.6 G/DL (ref 32–36)
MCV RBC AUTO: 85 FL (ref 82–98)
METHEMOGLOBIN: 0.3 % (ref 0–3)
MONOCYTES # BLD AUTO: 0.4 K/UL (ref 0.3–1)
MONOCYTES NFR BLD: 7.5 % (ref 4–15)
MV PEAK E VEL: 1.22 M/S
NEUTROPHILS # BLD AUTO: 3.7 K/UL (ref 1.8–7.7)
NEUTROPHILS NFR BLD: 71.8 % (ref 38–73)
NRBC BLD-RTO: 0 /100 WBC
PCO2 BLDA: 54.5 MMHG (ref 35–45)
PH SMN: 7.38 [PH] (ref 7.35–7.45)
PHOSPHATE SERPL-MCNC: 4.4 MG/DL (ref 2.7–4.5)
PISA TR MAX VEL: 1.5 M/S
PLATELET # BLD AUTO: 318 K/UL (ref 150–350)
PMV BLD AUTO: 9.5 FL (ref 9.2–12.9)
PO2 BLDA: 37 MMHG (ref 40–60)
POC BE: 7 MMOL/L
POC SATURATED O2: 67 % (ref 95–100)
POC TCO2: 34 MMOL/L (ref 24–29)
POTASSIUM SERPL-SCNC: 4.2 MMOL/L (ref 3.5–5.1)
PREALB SERPL-MCNC: 29 MG/DL (ref 20–43)
PROT SERPL-MCNC: 6.9 G/DL (ref 6–8.4)
PROTHROMBIN TIME: 18.5 SEC (ref 9–12.5)
RA MAJOR: 4.75 CM
RA PRESSURE: 15 MMHG
RA WIDTH: 4.03 CM
RBC # BLD AUTO: 4.53 M/UL (ref 4–5.4)
RIGHT VENTRICULAR END-DIASTOLIC DIMENSION: 4.5 CM
SAMPLE: ABNORMAL
SINUS: 3.26 CM
SITE: ABNORMAL
SODIUM SERPL-SCNC: 140 MMOL/L (ref 136–145)
STJ: 3.01 CM
TDI LATERAL: 0.07 M/S
TR MAX PG: 9 MMHG
TRICUSPID ANNULAR PLANE SYSTOLIC EXCURSION: 1.1 CM
TV REST PULMONARY ARTERY PRESSURE: 24 MMHG
WBC # BLD AUTO: 5.18 K/UL (ref 3.9–12.7)

## 2019-11-27 PROCEDURE — 99232 SBSQ HOSP IP/OBS MODERATE 35: CPT | Mod: NTX,,, | Performed by: INTERNAL MEDICINE

## 2019-11-27 PROCEDURE — 25000003 PHARM REV CODE 250: Mod: NTX | Performed by: PHYSICIAN ASSISTANT

## 2019-11-27 PROCEDURE — 83735 ASSAY OF MAGNESIUM: CPT | Mod: NTX

## 2019-11-27 PROCEDURE — 99291 CRITICAL CARE FIRST HOUR: CPT | Mod: NTX,,, | Performed by: PHYSICIAN ASSISTANT

## 2019-11-27 PROCEDURE — 97530 THERAPEUTIC ACTIVITIES: CPT | Mod: NTX

## 2019-11-27 PROCEDURE — 83615 LACTATE (LD) (LDH) ENZYME: CPT | Mod: NTX

## 2019-11-27 PROCEDURE — 97535 SELF CARE MNGMENT TRAINING: CPT | Mod: NTX

## 2019-11-27 PROCEDURE — 83050 HGB METHEMOGLOBIN QUAN: CPT | Mod: NTX

## 2019-11-27 PROCEDURE — 63600367 HC NITRIC OXIDE PER HOUR: Mod: NTX

## 2019-11-27 PROCEDURE — 27000221 HC OXYGEN, UP TO 24 HOURS: Mod: NTX

## 2019-11-27 PROCEDURE — 80048 BASIC METABOLIC PNL TOTAL CA: CPT | Mod: NTX

## 2019-11-27 PROCEDURE — 36415 COLL VENOUS BLD VENIPUNCTURE: CPT | Mod: NTX

## 2019-11-27 PROCEDURE — 99900035 HC TECH TIME PER 15 MIN (STAT): Mod: NTX

## 2019-11-27 PROCEDURE — 20000000 HC ICU ROOM: Mod: NTX

## 2019-11-27 PROCEDURE — 99291 PR CRITICAL CARE, E/M 30-74 MINUTES: ICD-10-PCS | Mod: NTX,,, | Performed by: PHYSICIAN ASSISTANT

## 2019-11-27 PROCEDURE — 80076 HEPATIC FUNCTION PANEL: CPT | Mod: NTX

## 2019-11-27 PROCEDURE — 85610 PROTHROMBIN TIME: CPT | Mod: NTX

## 2019-11-27 PROCEDURE — 84134 ASSAY OF PREALBUMIN: CPT | Mod: NTX

## 2019-11-27 PROCEDURE — 84100 ASSAY OF PHOSPHORUS: CPT | Mod: NTX

## 2019-11-27 PROCEDURE — 86140 C-REACTIVE PROTEIN: CPT | Mod: NTX

## 2019-11-27 PROCEDURE — 85730 THROMBOPLASTIN TIME PARTIAL: CPT | Mod: NTX

## 2019-11-27 PROCEDURE — 63600175 PHARM REV CODE 636 W HCPCS: Mod: NTX | Performed by: PHYSICIAN ASSISTANT

## 2019-11-27 PROCEDURE — 27000248 HC VAD-ADDITIONAL DAY: Mod: NTX

## 2019-11-27 PROCEDURE — 97164 PT RE-EVAL EST PLAN CARE: CPT | Mod: NTX

## 2019-11-27 PROCEDURE — 25000003 PHARM REV CODE 250: Mod: NTX | Performed by: STUDENT IN AN ORGANIZED HEALTH CARE EDUCATION/TRAINING PROGRAM

## 2019-11-27 PROCEDURE — 63600175 PHARM REV CODE 636 W HCPCS: Mod: NTX | Performed by: STUDENT IN AN ORGANIZED HEALTH CARE EDUCATION/TRAINING PROGRAM

## 2019-11-27 PROCEDURE — 82803 BLOOD GASES ANY COMBINATION: CPT | Mod: NTX

## 2019-11-27 PROCEDURE — 83880 ASSAY OF NATRIURETIC PEPTIDE: CPT | Mod: NTX

## 2019-11-27 PROCEDURE — 99292 PR CRITICAL CARE, ADDL 30 MIN: ICD-10-PCS | Mod: NTX,,, | Performed by: INTERNAL MEDICINE

## 2019-11-27 PROCEDURE — 99292 CRITICAL CARE ADDL 30 MIN: CPT | Mod: NTX,,, | Performed by: INTERNAL MEDICINE

## 2019-11-27 PROCEDURE — 99232 PR SUBSEQUENT HOSPITAL CARE,LEVL II: ICD-10-PCS | Mod: NTX,,, | Performed by: INTERNAL MEDICINE

## 2019-11-27 PROCEDURE — 85025 COMPLETE CBC W/AUTO DIFF WBC: CPT | Mod: NTX

## 2019-11-27 PROCEDURE — 94761 N-INVAS EAR/PLS OXIMETRY MLT: CPT | Mod: NTX

## 2019-11-27 RX ORDER — FUROSEMIDE 10 MG/ML
80 INJECTION INTRAMUSCULAR; INTRAVENOUS 2 TIMES DAILY
Status: DISCONTINUED | OUTPATIENT
Start: 2019-11-28 | End: 2019-11-28

## 2019-11-27 RX ORDER — FUROSEMIDE 10 MG/ML
80 INJECTION INTRAMUSCULAR; INTRAVENOUS ONCE
Status: COMPLETED | OUTPATIENT
Start: 2019-11-27 | End: 2019-11-27

## 2019-11-27 RX ORDER — WARFARIN 2 MG/1
4 TABLET ORAL DAILY
Status: DISCONTINUED | OUTPATIENT
Start: 2019-11-27 | End: 2019-11-30

## 2019-11-27 RX ADMIN — ASPIRIN 325 MG: 325 TABLET, DELAYED RELEASE ORAL at 09:11

## 2019-11-27 RX ADMIN — PANTOPRAZOLE SODIUM 40 MG: 40 TABLET, DELAYED RELEASE ORAL at 09:11

## 2019-11-27 RX ADMIN — GABAPENTIN 200 MG: 100 CAPSULE ORAL at 09:11

## 2019-11-27 RX ADMIN — SENNOSIDES AND DOCUSATE SODIUM 1 TABLET: 8.6; 5 TABLET ORAL at 09:11

## 2019-11-27 RX ADMIN — AMIODARONE HYDROCHLORIDE 0.5 MG/MIN: 1.8 INJECTION, SOLUTION INTRAVENOUS at 01:11

## 2019-11-27 RX ADMIN — EPINEPHRINE 0.02 MCG/KG/MIN: 1 INJECTION PARENTERAL at 09:11

## 2019-11-27 RX ADMIN — MEXILETINE HYDROCHLORIDE 200 MG: 200 CAPSULE ORAL at 01:11

## 2019-11-27 RX ADMIN — MAGNESIUM OXIDE TAB 400 MG (241.3 MG ELEMENTAL MG) 400 MG: 400 (241.3 MG) TAB at 09:11

## 2019-11-27 RX ADMIN — MIRTAZAPINE 15 MG: 15 TABLET, FILM COATED ORAL at 09:11

## 2019-11-27 RX ADMIN — VENLAFAXINE 150 MG: 37.5 TABLET ORAL at 09:11

## 2019-11-27 RX ADMIN — ZOLPIDEM TARTRATE 5 MG: 5 TABLET ORAL at 09:11

## 2019-11-27 RX ADMIN — WARFARIN SODIUM 4 MG: 2 TABLET ORAL at 05:11

## 2019-11-27 RX ADMIN — POLYETHYLENE GLYCOL 3350 17 G: 17 POWDER, FOR SOLUTION ORAL at 09:11

## 2019-11-27 RX ADMIN — SPIRONOLACTONE 12.5 MG: 25 TABLET, FILM COATED ORAL at 09:11

## 2019-11-27 RX ADMIN — MEXILETINE HYDROCHLORIDE 200 MG: 200 CAPSULE ORAL at 05:11

## 2019-11-27 RX ADMIN — LISINOPRIL 10 MG: 10 TABLET ORAL at 09:11

## 2019-11-27 RX ADMIN — MEXILETINE HYDROCHLORIDE 200 MG: 200 CAPSULE ORAL at 09:11

## 2019-11-27 RX ADMIN — FUROSEMIDE 80 MG: 10 INJECTION, SOLUTION INTRAMUSCULAR; INTRAVENOUS at 05:11

## 2019-11-27 RX ADMIN — ALPRAZOLAM 0.25 MG: 0.25 TABLET ORAL at 11:11

## 2019-11-27 NOTE — PLAN OF CARE
Problem: Physical Therapy Goal  Goal: Physical Therapy Goal  Description  Goals to be met by: 2019     Patient will increase functional independence with mobility by performin. Upper and lower extremity exercise program x10 reps per handout, with independence with no episodes of VTach to demo' increased tolerance to activity.      Outcome: Ongoing, Progressing     Pt re-evaluated and appropriate goals established. PT to follow pt during admission to ensure strength and mobility is maintained and to address endurance impairment with cardiac arrhythmia.     Consuelo Toledo, PT, DPT  2019  [phone]

## 2019-11-27 NOTE — PLAN OF CARE
Echo reviewed with Dr. Bryant.  RV large and septum bowing into LV (which is bigger- closer to 5.3 cm from 4.5 (improved) with speed decrease from 5300 to 5200.) IVC also dilated and does not collapse with sniff.  Plan to drop speed again to 5100 per Dr Bryant.  Will increase NO to 15 from 10 and also add back IV push lasix.   Do not want to increase intoropy with epi/ due to continued VT.   Repeat svo2 tomorrow morning and monitor CVP and UOP closely throughout night and evening.     Julieth Ramos PA-C

## 2019-11-27 NOTE — PROGRESS NOTES
Follow up note:    spoke to pt with her friend Flavia in attendance. Pt reports the team is waiting for insurance approval in order for pt  to be placed on transplant list.    Pt reports she was coping better earlier today, but anxiety is now increased due to medical event while using the BSC.   Worker provided emotional support.  The pt's friend Flavia is leaving soon due to her brother's need to go to the emergency room.  Pt's mother will come to the hospital to stay with pt.   No additional needs reported at this time.  Transplant social workers will follow as needed.

## 2019-11-27 NOTE — PLAN OF CARE
Pt with no significant events overnight. Discussed with Dr. Sushma TURNER 12-13, no other interventions at this time. SvO2 67%. POC to continue heart transplant work up, discussed with pt in full detail, all concerns addressed. WCBRAYDEN.

## 2019-11-27 NOTE — PROGRESS NOTES
[facility]  Cardiac Electrophysiology  Progress Note    Admission Date: 11/13/2019  Code Status: Full Code   Attending Physician: Vi Bryant MD   Expected Discharge Date: 12/10/2019  Principal Problem:Left ventricular assist device (LVAD) complication    Subjective:     Interval History: No events overnight. Looking forward to getting out of bed today with PT.      Objective:     Vital Signs (Most Recent):  Temp: 97.4 °F (36.3 °C) (11/27/19 0800)  Pulse: 74 (11/27/19 1000)  Resp: 20 (11/27/19 1000)  BP: (!) 90/0 (11/27/19 0800)  SpO2: 99 % (11/27/19 0758) Vital Signs (24h Range):  Temp:  [97.4 °F (36.3 °C)-98.5 °F (36.9 °C)] 97.4 °F (36.3 °C)  Pulse:  [] 74  Resp:  [15-35] 20  SpO2:  [99 %-100 %] 99 %  BP: (76-92)/(0) 90/0     Weight: 102.3 kg (225 lb 8.5 oz)  Body mass index is 35.32 kg/m².     SpO2: 99 %  O2 Device (Oxygen Therapy): nasal cannula w/ humidification    Physical Exam  Vital signs reviewed  Constitutional: Oriented to person, place, and time. Appears  well-developed and well-nourished.   HENT:   Head: Normocephalic and atraumatic.   Eyes: EOM are normal.   Neck: Normal range of motion. No JVD present.   Cardiovascular:  LVAD hum present Exam reveals no gallop and no friction rub.   No murmur heard.  Pulmonary/Chest: Effort normal and breath sounds normal. No wheeze or rales present. No chest wall tenderness  Abdominal: Soft. Bowel sounds are normal. There is no tenderness. There is no guarding.   Musculoskeletal: There is no edema present   Skin: Skin is warm and dry.     Significant Labs: All pertinent lab results from the last 24 hours have been reviewed.    Significant Imaging: reviewed    Assessment and Plan:     Ventricular tachycardia  Appropriate shocks after ATP failed to terminate MMVT  (1st episode), Appropriate shock after acceleration to VF zone with ATP (1st shock failed to terminate ->  2nd shock terminated)    Recs:  - Ok to transition to amiodarone PO  today (Amiodarone 400 mg BID) but also reasonable to continue on gtt for another day.   - If more VT with therapy consider IV Lidocaine (and stop Mexelitine). Continue with Mexiletine to 200 mg TID for now  - Wean off epi gtt as tolerated   - Keep Mg >2 K>4    Thank you for allowing us to care for this patient. We will sign off now. Please call with any questions or concerns.           Christa Chen MD  Cardiac Electrophysiology  [facility]

## 2019-11-27 NOTE — PROGRESS NOTES
11/27/2019  Christo Cooper    Current provider:  Vi Bryant MD      I, Christo Cooper, rounded on Deborah Navas to ensure all mechanical assist device settings (IABP or VAD) were appropriate and all parameters were within limits.  I was able to ensure all back up equipment was present, the staff had no issues, and the Perfusion Department daily rounding was complete.    10:08 AM

## 2019-11-27 NOTE — SUBJECTIVE & OBJECTIVE
Interval History: No events overnight. Looking forward to getting out of bed today with PT.      Objective:     Vital Signs (Most Recent):  Temp: 97.4 °F (36.3 °C) (11/27/19 0800)  Pulse: 74 (11/27/19 1000)  Resp: 20 (11/27/19 1000)  BP: (!) 90/0 (11/27/19 0800)  SpO2: 99 % (11/27/19 0758) Vital Signs (24h Range):  Temp:  [97.4 °F (36.3 °C)-98.5 °F (36.9 °C)] 97.4 °F (36.3 °C)  Pulse:  [] 74  Resp:  [15-35] 20  SpO2:  [99 %-100 %] 99 %  BP: (76-92)/(0) 90/0     Weight: 102.3 kg (225 lb 8.5 oz)  Body mass index is 35.32 kg/m².     SpO2: 99 %  O2 Device (Oxygen Therapy): nasal cannula w/ humidification    Physical Exam  Vital signs reviewed  Constitutional: Oriented to person, place, and time. Appears  well-developed and well-nourished.   HENT:   Head: Normocephalic and atraumatic.   Eyes: EOM are normal.   Neck: Normal range of motion. No JVD present.   Cardiovascular:  LVAD hum present Exam reveals no gallop and no friction rub.   No murmur heard.  Pulmonary/Chest: Effort normal and breath sounds normal. No wheeze or rales present. No chest wall tenderness  Abdominal: Soft. Bowel sounds are normal. There is no tenderness. There is no guarding.   Musculoskeletal: There is no edema present   Skin: Skin is warm and dry.     Significant Labs: All pertinent lab results from the last 24 hours have been reviewed.    Significant Imaging: reviewed

## 2019-11-27 NOTE — PLAN OF CARE
Goals not fully achieved but not appropriate at this time.   Problem: Occupational Therapy Goal  Goal: Occupational Therapy Goal  Description  Goals to be met by: 12/26/19     Patient will increase functional independence with ADLs by performing:    UE Dressing with [place].  LE Dressing with [place].  Grooming while standing at sink with [place].  Toileting from toilet with [place] for hygiene and clothing management.   Bathing from  shower chair/bench with [place].  Increased functional strength to WFL for B UE.  Upper extremity exercise program x15 reps per handout, with independence.  Pt will increase FMC from fair to good.       Outcome: Unable to Meet, Plan Revised

## 2019-11-27 NOTE — SUBJECTIVE & OBJECTIVE
Interval History: No complaints, no events overnight. Feels generally well. Denies NVD, SOB, Chest pain.  Up to bedside commode today around 11:30 and had episode of VT with successful ATP (symptomatic, felt lightheaded). Will continue amio for now and discuss with EP. Will go ahead and order repeat echo to assess LV/RV size today vs tomorrow due to another episode of VT associated with position. No low flows/change in flows during episode.     Continuous Infusions:   sodium chloride 0.9%      amiodarone in dextrose 5% 0.5 mg/min (11/27/19 1000)    epinephrine 0.02 mcg/kg/min (11/27/19 1000)    nitric oxide gas       Scheduled Meds:   aspirin  325 mg Oral Daily    gabapentin  200 mg Oral BID    hepatitis A virus vaccine (PF)  1,440 Units Intramuscular Once    lisinopril  10 mg Oral BID    magnesium oxide  400 mg Oral Daily    mexiletine  200 mg Oral Q8H    mirtazapine  15 mg Oral QHS    pantoprazole  40 mg Oral Daily    polyethylene glycol  17 g Oral Daily    senna-docusate 8.6-50 mg  1 tablet Oral BID    spironolactone  12.5 mg Oral Daily    venlafaxine  150 mg Oral Daily    warfarin  4 mg Oral Daily     PRN Meds:sodium chloride 0.9%, ALPRAZolam, pneumoc 13-amber conj-dip cr(PF), promethazine, senna-docusate 8.6-50 mg, zolpidem    Review of patient's allergies indicates:   Allergen Reactions    Adhesive Blisters     Reaction to area in chest and up only    Codeine Itching     Objective:     Vital Signs (Most Recent):  Temp: 97.4 °F (36.3 °C) (11/27/19 0800)  Pulse: 74 (11/27/19 1000)  Resp: 20 (11/27/19 1000)  BP: (!) 90/0 (11/27/19 0800)  SpO2: 99 % (11/27/19 0758) Vital Signs (24h Range):  Temp:  [97.4 °F (36.3 °C)-98.5 °F (36.9 °C)] 97.4 °F (36.3 °C)  Pulse:  [] 74  Resp:  [15-35] 20  SpO2:  [99 %-100 %] 99 %  BP: (76-92)/(0) 90/0     Patient Vitals for the past 72 hrs (Last 3 readings):   Weight   11/27/19 0533 102.3 kg (225 lb 8.5 oz)   11/26/19 0500 102 kg (224 lb 13.9 oz)   11/25/19  1400 101.6 kg (224 lb)     Body mass index is 35.32 kg/m².      Intake/Output Summary (Last 24 hours) at 11/27/2019 1130  Last data filed at 11/27/2019 1000  Gross per 24 hour   Intake 1883.6 ml   Output 2150 ml   Net -266.4 ml     Hemodynamic Parameters:       Physical Exam   Constitutional: She is oriented to person, place, and time. She appears well-developed and well-nourished.   HENT:   Mouth/Throat: Oropharynx is clear and moist.   Eyes: EOM are normal.   Neck: No JVD present.   Cardiovascular: Normal rate, regular rhythm and intact distal pulses.   Smooth VAD hum   Pulmonary/Chest: Effort normal and breath sounds normal. No respiratory distress. She has no rales.   Abdominal: Soft. Bowel sounds are normal. She exhibits no distension. There is no tenderness. There is no guarding.   Musculoskeletal: She exhibits no edema.   Neurological: She is alert and oriented to person, place, and time.   Skin: Skin is warm.   Psychiatric: She has a normal mood and affect.   Nursing note and vitals reviewed.      Significant Labs:  CBC:  Recent Labs   Lab 11/25/19 0357 11/26/19 0306 11/27/19 0319   WBC 5.24 5.27 5.18   RBC 4.44 4.49 4.53   HGB 11.0* 11.0* 11.0*   HCT 38.2 38.5 38.5    326 318   MCV 86 86 85   MCH 24.8* 24.5* 24.3*   MCHC 28.8* 28.6* 28.6*     BNP:  Recent Labs   Lab 11/22/19  0349 11/25/19  0357 11/27/19 0319   * 192* 217*     CMP:  Recent Labs   Lab 11/22/19  0349 11/25/19  0357 11/25/19  1145 11/26/19  0306 11/27/19  0319   *   < > 140* 103 149* 149*   CALCIUM 9.3   < > 9.7 9.8 9.6 9.6   ALBUMIN 3.3*  --  3.5  --   --  3.4*   PROT 6.9  --  7.0  --   --  6.9      < > 141 141 139 140   K 4.0   < > 4.0 3.7 4.4 4.2   CO2 29   < > 29 32* 31* 29      < > 100 99 99 101   BUN 37*   < > 31* 29* 32* 29*   CREATININE 2.0*   < > 1.5* 1.5* 1.7* 1.7*   ALKPHOS 92  --  90  --   --  94   ALT 18  --  15  --   --  16   AST 21  --  19  --   --  21   BILITOT 0.5  --  0.5  --   --  0.3     < > = values in this interval not displayed.      Coagulation:   Recent Labs   Lab 11/25/19 0357 11/26/19  0306 11/27/19 0319   INR 1.9* 1.9* 1.9*   APTT 31.3 33.3* 33.0*     LDH:  Recent Labs   Lab 11/25/19 0357 11/26/19  0306 11/27/19 0319    250 207     Microbiology:  Microbiology Results (last 7 days)     ** No results found for the last 168 hours. **          I have reviewed all pertinent labs within the past 24 hours.    Estimated Creatinine Clearance: 48.7 mL/min (A) (based on SCr of 1.7 mg/dL (H)).    Diagnostic Results:  I have reviewed and interpreted all pertinent imaging results/findings within the past 24 hours.

## 2019-11-27 NOTE — ASSESSMENT & PLAN NOTE
- will attempt to list with exemption due to VT with ICD shocks and RV failure (will not tolerate )  - approved for transplant on 11/26, awaiting insurance approval for listing.

## 2019-11-27 NOTE — PROGRESS NOTES
[facility]  Heart Transplant  Progress Note    Patient Name: Deborah Navas  MRN: [id number]  Admission Date: 11/13/2019  Hospital Length of Stay: 12 days  Attending Physician: Vi Bryant MD  Primary Care Provider: Primary Doctor No  Principal Problem:Left ventricular assist device (LVAD) complication    Subjective:     Interval History: No complaints, no events overnight. Feels generally well. Denies NVD, SOB, Chest pain.  Up to bedside commode today around 11:30 and had episode of VT with successful ATP (symptomatic, felt lightheaded). Will continue amio for now and discuss with EP. Will go ahead and order repeat echo to assess LV/RV size today vs tomorrow due to another episode of VT associated with position. No low flows/change in flows during episode.     Continuous Infusions:   sodium chloride 0.9%      amiodarone in dextrose 5% 0.5 mg/min (11/27/19 1000)    epinephrine 0.02 mcg/kg/min (11/27/19 1000)    nitric oxide gas       Scheduled Meds:   aspirin  325 mg Oral Daily    gabapentin  200 mg Oral BID    hepatitis A virus vaccine (PF)  1,440 Units Intramuscular Once    lisinopril  10 mg Oral BID    magnesium oxide  400 mg Oral Daily    mexiletine  200 mg Oral Q8H    mirtazapine  15 mg Oral QHS    pantoprazole  40 mg Oral Daily    polyethylene glycol  17 g Oral Daily    senna-docusate 8.6-50 mg  1 tablet Oral BID    spironolactone  12.5 mg Oral Daily    venlafaxine  150 mg Oral Daily    warfarin  4 mg Oral Daily     PRN Meds:sodium chloride 0.9%, ALPRAZolam, pneumoc 13-amber conj-dip cr(PF), promethazine, senna-docusate 8.6-50 mg, zolpidem    Review of patient's allergies indicates:   Allergen Reactions    Adhesive Blisters     Reaction to area in chest and up only    Codeine Itching     Objective:     Vital Signs (Most Recent):  Temp: 97.4 °F (36.3 °C) (11/27/19 0800)  Pulse: 74 (11/27/19 1000)  Resp: 20 (11/27/19 1000)  BP: (!) 90/0 (11/27/19 0800)  SpO2: 99  % (11/27/19 0758) Vital Signs (24h Range):  Temp:  [97.4 °F (36.3 °C)-98.5 °F (36.9 °C)] 97.4 °F (36.3 °C)  Pulse:  [] 74  Resp:  [15-35] 20  SpO2:  [99 %-100 %] 99 %  BP: (76-92)/(0) 90/0     Patient Vitals for the past 72 hrs (Last 3 readings):   Weight   11/27/19 0533 102.3 kg (225 lb 8.5 oz)   11/26/19 0500 102 kg (224 lb 13.9 oz)   11/25/19 1400 101.6 kg (224 lb)     Body mass index is 35.32 kg/m².      Intake/Output Summary (Last 24 hours) at 11/27/2019 1130  Last data filed at 11/27/2019 1000  Gross per 24 hour   Intake 1883.6 ml   Output 2150 ml   Net -266.4 ml     Hemodynamic Parameters:       Physical Exam   Constitutional: She is oriented to person, place, and time. She appears well-developed and well-nourished.   HENT:   Mouth/Throat: Oropharynx is clear and moist.   Eyes: EOM are normal.   Neck: No JVD present.   Cardiovascular: Normal rate, regular rhythm and intact distal pulses.   Smooth VAD hum   Pulmonary/Chest: Effort normal and breath sounds normal. No respiratory distress. She has no rales.   Abdominal: Soft. Bowel sounds are normal. She exhibits no distension. There is no tenderness. There is no guarding.   Musculoskeletal: She exhibits no edema.   Neurological: She is alert and oriented to person, place, and time.   Skin: Skin is warm.   Psychiatric: She has a normal mood and affect.   Nursing note and vitals reviewed.      Significant Labs:  CBC:  Recent Labs   Lab 11/25/19  0357 11/26/19  0306 11/27/19  0319   WBC 5.24 5.27 5.18   RBC 4.44 4.49 4.53   HGB 11.0* 11.0* 11.0*   HCT 38.2 38.5 38.5    326 318   MCV 86 86 85   MCH 24.8* 24.5* 24.3*   MCHC 28.8* 28.6* 28.6*     BNP:  Recent Labs   Lab 11/22/19  0349 11/25/19  0357 11/27/19  0319   * 192* 217*     CMP:  Recent Labs   Lab 11/22/19  0349 11/25/19  0357 11/25/19  1145 11/26/19  0306 11/27/19  0319   *   < > 140* 103 149* 149*   CALCIUM 9.3   < > 9.7 9.8 9.6 9.6   ALBUMIN 3.3*  --  3.5  --   --  3.4*   PROT  6.9  --  7.0  --   --  6.9      < > 141 141 139 140   K 4.0   < > 4.0 3.7 4.4 4.2   CO2 29   < > 29 32* 31* 29      < > 100 99 99 101   BUN 37*   < > 31* 29* 32* 29*   CREATININE 2.0*   < > 1.5* 1.5* 1.7* 1.7*   ALKPHOS 92  --  90  --   --  94   ALT 18  --  15  --   --  16   AST 21  --  19  --   --  21   BILITOT 0.5  --  0.5  --   --  0.3    < > = values in this interval not displayed.      Coagulation:   Recent Labs   Lab 11/25/19 0357 11/26/19  0306 11/27/19  0319   INR 1.9* 1.9* 1.9*   APTT 31.3 33.3* 33.0*     LDH:  Recent Labs   Lab 11/25/19 0357 11/26/19 0306 11/27/19  0319    250 207     Microbiology:  Microbiology Results (last 7 days)     ** No results found for the last 168 hours. **          I have reviewed all pertinent labs within the past 24 hours.    Estimated Creatinine Clearance: 48.7 mL/min (A) (based on SCr of 1.7 mg/dL (H)).    Diagnostic Results:  I have reviewed and interpreted all pertinent imaging results/findings within the past 24 hours.    Assessment and Plan:     48 yo WF with NICM, s/p HM3 implantation 9/10/19 (post up coarse uncomplicated with the exception of+ diaphragmatic stimulation of LV lead and pleural effusion with chest tube placement, atrial flutter with NADINE/DCCV), V-fib reported in setting of hypokalemia with appropriate shock from ICD on Amiodarone prior to LVAD placement; and recent hospitalizations for ventricular arrythmias; started on Mexilitine.  She was seen in EP clinic today and she continues to have multiple VT episodes with some ATP therapy, although no ICD shocks since starting mexiletine 10/24/2019. One slow VT episode 2.5hrs 11/3/2019. Patient asymptomatic with LVAD. On coumadin. No AFL since post-op event (paced out of rhythm 9/24/2019). CHB with 100% V pacing (LV lead off). She does not feel well. Dry heaving with mexiletine. Taking phenergan PRN. 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   She was seen  in HTS clinic and reported 4 low flow alarms the last 4 nights.  She reports they occur in her sleep and last for only a few seconds.  By the time she wakes up; they quickly stop.  She does report to possibly being little volume overloaded.  She hasn't noticed weight gain at home but thinks she may have little extra fluid.  She denies SOB, chest pain, palpitations, LEGER. In review of her records: she was 223lbs on 10/24/19 during previous hospitalization and is 235lbs now.  Her lasix had previously been decreased to prn.     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    Essential hypertension  -Patient is non-pulsatile  -Doppler goal 60-90 mmHg  -Antihypertensive medications include  Lisinopril  -Hydralazine was started on evening of admit and then changed to low dose Norvasc and Spironolactone to aid with BP and low K+.  Hesitated on increasing Lisinopril because of active diuresis and concern for fluctuation of creatinine    - amlodipine now on hold in setting of symptomatic hypotension and LFAs on 11/16      Ventricular tachycardia  - Per EP office visit on day of admission: She continues to have multiple VT episodes with some ATP therapy, although no ICD shocks since starting mexiletine 10/24/2019. One slow VT episode 2.5hrs 11/3/2019. Patient asymptomatic with LVAD. On coumadin. No AFL since post-op event (paced out of rhythm 9/24/2019). CHB with 100% V pacing (LV lead off).   - Plan was to continue current regimen despite  Mexiletine making her nauseous.  - Decreased Amiodarone to daily per EP plan on discharge last hospitalization   - Shocked x 3 11/25 for MMVT   - continues on amio ggt s/p amio load and increased mexiltine to 200 per EP. Still following, appreciate assistance     LVAD (left ventricular assist device) present  -HeartMate 3 Implanted 9/10/19 as DT.  -Implanted by Dr. Walden  -INR sub therapeutic. Goal INR 2.0-3.0.Boost Coumadin.   - Previous home dose was 3mg on Wed and 1.5mg QDaily  -Antiplatelets:  mg.  -LDH is stable Will monitor daily.   -Speed set at 5200. Decreased 11/25 in setting of Vt with ICD shocks associated with valsalva and small LV   - repeat echo today due to another episode of VT while on commode  -Not listed for OHTx: was declined due to pulmonary hypertension and incomplete care giving plan. Patient now has care giving plan. 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 Urgent selection 11/26, approved for transplant. Awaiting listing (insurance approval).    Procedure: Device Interrogation Including analysis of device parameters  Current Settings: Ventricular Assist Device  Review of device function is stable    TXP LVAD INTERROGATIONS 11/27/2019 11/27/2019 11/27/2019 11/27/2019 11/27/2019 11/27/2019 11/27/2019   Type HeartMate3 HeartMate3 HeartMate3 - - - HeartMate3   Flow 3.7 4.0 3.8 3.9 4.0 4.0 4.0   Speed 5200 5200 5200 5200 5250 5200 5250   PI 4.6 3.8 4.7 4.4 3.9 4.4 4.4   Power ([place]) 3.6 3.6 3.5 3.6 3.2 3.4 3.6   LSL 4800 4800 4800 - - - -   Pulsatility Intermittent pulse Intermittent pulse Intermittent pulse - - - -       CKD (chronic kidney disease)  - Baseline creat appears 1.3-1.6  - Will monitor with diuresis     Acute on chronic combined systolic and diastolic heart failure  -NICM  -Last 2D Echo 11/25/19. Severe TR, IVC 8, normal RV function?, LV/RV dimensions 4.5/3.9 @ 5300. Dr mandras to review. Will repeat again today due to VT with ATP while on bedside commode (3x  episodes of VT while on commode) at decreased speed of 5200  -lasix on hold per Dr Bryant- likely needs CVP slightly higher (10-12?) due to renal function and HM3 with restrictive filling? Monitor closely   - now on EPI .02 and NO @ 10 and SvO2 67   - CVP 9-10 this AM  - GDMT: Continue ACE 10 BID, aldactone 12.5   - was on lasix 20 PRN at home PTA  -2g Na dietary restriction, 1500 mL fluid restriction, strict I/Os      ICD (implantable cardioverter-defibrillator) in place  - [employer]onic ICD  - Shocked x 3 11/25 for MMVT   - continues on amio ggt s/p amio load and increased mexiltine to 200 per EP. Still following, appreciate assistance   - See above VT        Julieth Ramos PA-C  Heart Transplant  [facility]    Uninterrupted Critical Care/Counseling Time (not including procedures): 45 minutes

## 2019-11-27 NOTE — ASSESSMENT & PLAN NOTE
-NICM  -Last 2D Echo 11/25/19. Severe TR, IVC 8, normal RV function?, LV/RV dimensions 4.5/3.9 @ 5300. Dr arroyo to review. Will repeat again today due to VT with ATP while on bedside commode (3x episodes of VT while on commode) at decreased speed of 5200  -lasix on hold per Dr Arroyo- likely needs CVP slightly higher (10-12?) due to renal function and HM3 with restrictive filling? Monitor closely   - now on EPI .02 and NO @ 10 and SvO2 67   - CVP 9-10 this AM  - GDMT: Continue ACE 10 BID, aldactone 12.5   - was on lasix 20 PRN at home PTA  -2g Na dietary restriction, 1500 mL fluid restriction, strict I/Os

## 2019-11-27 NOTE — ASSESSMENT & PLAN NOTE
Appropriate shocks after ATP failed to terminate MMVT  (1st episode), Appropriate shock after acceleration to VF zone with ATP (1st shock failed to terminate ->  2nd shock terminated)    Recs:  - Ok to transition to amiodarone PO today (Amiodarone 400 mg BID) but also reasonable to continue on gtt for another day.   - If more VT with therapy consider IV Lidocaine (and stop Mexelitine). Continue with Mexiletine to 200 mg TID for now  - Wean off epi gtt as tolerated   - Keep Mg >2 K>4    Thank you for allowing us to care for this patient. We will sign off now. Please call with any questions or concerns.

## 2019-11-27 NOTE — ASSESSMENT & PLAN NOTE
-HeartMate 3 Implanted 9/10/19 as DT.  -Implanted by Dr. Walden  -INR sub therapeutic. Goal INR 2.0-3.0.Boost Coumadin.   - Previous home dose was 3mg on Wed and 1.5mg QDaily  -Antiplatelets:  mg.  -LDH is stable Will monitor daily.   -Speed set at 5200. Decreased 11/25 in setting of Vt with ICD shocks associated with valsalva and small LV   - repeat echo today due to another episode of VT while on commode  -Not listed for OHTx: was declined due to pulmonary hypertension and incomplete care giving plan. Patient now has care giving plan. 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 Urgent selection 11/26, approved for transplant. Awaiting listing (insurance approval).    Procedure: Device Interrogation Including analysis of device parameters  Current Settings: Ventricular Assist Device  Review of device function is stable    TXP LVAD INTERROGATIONS 11/27/2019 11/27/2019 11/27/2019 11/27/2019 11/27/2019 11/27/2019 11/27/2019   Type HeartMate3 HeartMate3 HeartMate3 - - - HeartMate3   Flow 3.7 4.0 3.8 3.9 4.0 4.0 4.0   Speed 5200 5200 5200 5200 5250 5200 5250   PI 4.6 3.8 4.7 4.4 3.9 4.4 4.4   Power (Orellana) 3.6 3.6 3.5 3.6 3.2 3.4 3.6   LSL 4800 4800 4800 - - - -   Pulsatility Intermittent pulse Intermittent pulse Intermittent pulse - - - -

## 2019-11-27 NOTE — PT/OT/SLP RE-EVAL
"Physical Therapy Re-evaluation and Treatment    Patient Name:  Deborah Navas   MRN:  [id number]    *co-treatment with OT   Recommendations:     Discharge Recommendations:  home   Discharge Equipment Recommendations: none   Barriers to discharge: None    Assessment:     Deborah Navas is a 49 y.o. female admitted with a medical diagnosis of Left ventricular assist device (LVAD) complication.  She presents with the following impairments/functional limitations:  gait instability, impaired cardiopulmonary response to activity. Pt able to transfer from bed to bedside commode without assistance. Pt urinated, about to perform toileting when monitor began alarming for VTach. Pt initially reported feeling "ok", however, then stated "no I have to go now" and quickly transferred to supine in bed. Pt reported feeling palpitations, denied ICD shock. Pt returned to bed, exercises provided with instruction to perform within tolerance. PT to follow pt during admission to ensure strength and mobility is maintained and to address endurance impairment with cardiac arrhythmia.        Rehab Prognosis:  good; patient would benefit from acute skilled PT services to address these deficits and reach maximum level of function.      Recent Surgery: Procedure(s) (LRB):  INSERTION, CATHETER, RIGHT HEART (N/A) 9 Days Post-Op    Plan:     During this hospitalization, patient to be seen 2 x/week to address the above listed problems via gait training, therapeutic activities, therapeutic exercises, neuromuscular re-education  · Plan of Care Expires:  12/27/19   Plan of Care Reviewed with: patient, family    Subjective     Communicated with RN prior to session.  Patient found HOB elevated with arterial line, blood pressure cuff, pulse ox (continuous), telemetry, LVAD(nitric oxide) upon PT entry to room, agreeable to evaluation.      Chief Complaint: Pt states reports feeling racing of heart, no shock.  Patient comments/goals: to " "get better and return home   Pain/Comfort:  · Pain Rating 1: 0/10    Patients cultural, spiritual, Advent conflicts given the current situation: no      Objective:     Patient found with: arterial line, blood pressure cuff, pulse ox (continuous), telemetry, LVAD(nitric oxide)     General Precautions: Standard, LVAD   Orthopedic Precautions:N/A   Braces: N/A     Exams:  · Cognitive Exam:  Patient is AAOx4, followed all commands, communicates clearly and fluently  · RLE ROM: WFL  · RLE Strength: WFL  · LLE ROM: WFL  · LLE Strength: WFL    Functional Mobility:  · Bed Mobility:     · Supine <> sit: modified independence   · Transfers:     · Sit to Stand:  supervision with no AD  · Bed <> BSC: stand by assistance with  no AD  using  Step Transfer  · Gait: Pt ambulated 2x 3 feet from bed to BSC with stand by assistance and no AD. Pt with no LOB, no SOB, no dizziness. Further mobility not performed 2/2 onset of VTach.     AM-PAC 6 CLICK MOBILITY  Total Score:24       Therapeutic Activities and Exercises:  Pt able to transfer from bed to bedside commode without assistance. Pt urinated, about to perform toileting when monitor began alarming for VTach. Pt initially reported feeling "ok", however, then stated "no I have to go now" and quickly transferred to supine in bed. Pt reported feeling palpitations, denied ICD shock. RN present.   Pt educated regarding HEP including 10x B knee-to-chest, 10x B SLR, 10x B shoulder flexion/extension, 10x shoulder horizontal abduction/adduction. Pt instructed to perform within tolerance, to stop if pt experiences onset of symptoms associated with episodes of Vtach. Pt demo'd understanding.   Pt encouraged to only perform OOB mobility with assistance from nursing/therapy. Pt agreeable.   LVAD to wall power throughout session. No alarms sounded.     Patient left HOB elevated with all lines intact, call button in reach and RN notified.    GOALS:   Multidisciplinary Problems     Physical " Therapy Goals        Problem: Physical Therapy Goal    Goal Priority Disciplines Outcome Goal Variances Interventions   Physical Therapy Goal     PT, PT/OT Ongoing, Progressing     Description:  Goals to be met by: 2019     Patient will increase functional independence with mobility by performin. Upper and lower extremity exercise program x10 reps per handout, with independence with no episodes of VTach to demo' increased tolerance to activity.                       History:     Past Medical History:   Diagnosis Date    Fractures     History of ventricular fibrillation 2019    History of ventricular tachycardia 2019    Hyperlipidemia     Migraine     Osteoarthritis        Past Surgical History:   Procedure Laterality Date    BACK SURGERY      2007    BONE GRAFT Left     from Left hip to Left FA    eardrum reconstruction  1980    ELBOW SURGERY Left [id number]    FOREARM FRACTURE SURGERY Bilateral [id number]    multiple surgeries    INSERTION OF GRAFT TO PERICARDIUM  9/10/2019    Procedure: INSERTION, GRAFT, PERICARDIUM;  Surgeon: Shar Walden MD;  Location: [facility] OR [address] [place] [address];  Service: Cardiovascular;;    INSERTION OF IMPLANTABLE CARDIOVERTER-DEFIBRILLATOR (ICD) GENERATOR WITH TWO EXISTING LEADS      INSERTION OF PACEMAKER Left     LEFT VENTRICULAR ASSIST DEVICE N/A 9/10/2019    Procedure: INSERTION-LEFT VENTRICULAR ASSIST DEVICE;  Surgeon: Shar Walden MD;  Location: [facility] OR [address] [place] [address];  Service: Cardiovascular;  Laterality: N/A;  DT HM3     LUMBAR FUSION      L4-L5    RIGHT HEART CATHETERIZATION Right 2019    Procedure: INSERTION, CATHETER, RIGHT HEART;  Surgeon: Vi Bryant MD;  Location: [facility] CATH LAB;  Service: Cardiology;  Laterality: Right;    RIGHT HEART CATHETERIZATION N/A 2019    Procedure: INSERTION, CATHETER, RIGHT HEART;  Surgeon: Eric Antunez Jr., MD;  Location: [facility] CATH LAB;  Service: Cardiology;  Laterality: N/A;    SINUS SURGERY Right  1994    with lymph nodes    STERNAL WOUND CLOSURE  9/10/2019    Procedure: CLOSURE, WOUND, STERNUM;  Surgeon: Shar Walden MD;  [facility];  Service: Cardiovascular;;    TEMPOROMANDIBULAR JOINT SURGERY Right 1988    TONSILLECTOMY      TREATMENT OF CARDIAC ARRHYTHMIA N/A 9/24/2019    Procedure: CARDIOVERSION;  Surgeon: Moe Barrera MD;  Location: [facility] EP LAB;  Service: Cardiology;  Laterality: N/A;  AF, DCCV/NADINE, anes, DM, Rm 3073    TYMPANOSTOMY TUBE PLACEMENT  1971- 1979    multiple tube placements       Time Tracking:     PT Received On: 11/27/19  PT Start Time: 1058     PT Stop Time: 1121  PT Total Time (min): 23 min     Billable Minutes: Re-eval 10 mins  and Therapeutic Activity 10 mins       Consuelo Toledo, PT  11/27/2019

## 2019-11-27 NOTE — PROGRESS NOTES
ZAC Paulino notified of patient's run of VTACH while up to bedside commode with PT/OT. Patient states she did not bear down, she felt palpitations and then got lightheaded. Once returning to bed the VTACH episode stopped. Patient stated she did not feel her AICD shock her. No VAD alarms. EP to be notified of event.     KIM.

## 2019-11-27 NOTE — PT/OT/SLP PROGRESS
"Occupational Therapy   Treatment    Name: Deborah Navas  MRN: [id number]  Admitting Diagnosis:  Left ventricular assist device (LVAD) complication  9 Days Post-Op    Recommendations:     Discharge Recommendations: (TBD- pt is awaiting OHT)  Discharge Equipment Recommendations:  none    Assessment:     Deborah Navas is a 49 y.o. female . Pt tolerated session poorly due to V-Tach with toileting. Pt is appropriate for one discipline only at this time with PT to follow. No further OT goals at this time.   Plan:     · D/C OT 11/27/2019     Subjective     "I want to go to the bathroom" pt reports.     Pain/Comfort:  · Pain Rating 1: 0/10    Objective:     Communicated with: nsg prior to session.   Pt found supine in bed with LVAD numbers stable.     General Precautions: Standard, LVAD   Orthopedic Precautions:N/A     Occupational Performance:     Bed Mobility:      Functional Mobility/Transfers:  · Pt completed sit>stand with supervision.  · Pt completed stand pivot t/f with SBA     Activities of Daily Living:  · Pt able to complete all ADL's in bed with supervision/set-up  · ADL's in stand limited due to episodes of Vtach       [facility] 6 Click ADL: 19    Treatment & Education:  --Medical team cleared pt for mobility as tolerated. Pt demo Fair+ sitting balance and completed stand pivot t/f to [facility] with SBA. Pt with stable vital signs and LVAD numbers as she urinated on commode. Pt then with tele alarm sound v-tach and pt reports not feeling well. Pt was able to t/f self back to bed and return supine immediately with returning to NSR.  --Pt reports independence with FM coord/strengthening exs. Pt demo WFL UE strength and performance of ADL's bed level with set-up.  --Pt to benefit from one discipline at this time with PT to follow-up. OT to sign off at this time and await increased medical stability or heart transplant when pt can tolerate further activity.     Patient left supine with all lines intact, " call button in reach and nsg notifiedEducation:      GOALS:   Multidisciplinary Problems     Occupational Therapy Goals        Problem: Occupational Therapy Goal    Goal Priority Disciplines Outcome Interventions   Occupational Therapy Goal     OT, PT/OT Unable to Meet, Plan Revised    Description:  Goals to be met by: 12/26/19     Patient will increase functional independence with ADLs by performing:    UE Dressing with [place].  LE Dressing with [place].  Grooming while standing at sink with [place].  Toileting from toilet with [place] for hygiene and clothing management.   Bathing from  shower chair/bench with [place].  Increased functional strength to WFL for B UE.  Upper extremity exercise program x15 reps per handout, with independence.  Pt will increase FMC from fair to good.                        Time Tracking:     OT Date of Treatment: 11/27/19  OT Start Time: 1058  OT Stop Time: 1121  OT Total Time (min): 23 min    Billable Minutes:Self Care/Home Management 23    JAGJIT Madison  11/27/2019

## 2019-11-28 LAB
ALLENS TEST: ABNORMAL
ALLENS TEST: ABNORMAL
ANION GAP SERPL CALC-SCNC: 8 MMOL/L (ref 8–16)
APTT BLDCRRT: 35.4 SEC (ref 21–32)
BASOPHILS # BLD AUTO: 0.06 K/UL (ref 0–0.2)
BASOPHILS NFR BLD: 1.2 % (ref 0–1.9)
BUN SERPL-MCNC: 28 MG/DL (ref 6–20)
CALCIUM SERPL-MCNC: 9.3 MG/DL (ref 8.7–10.5)
CHLORIDE SERPL-SCNC: 99 MMOL/L (ref 95–110)
CO2 SERPL-SCNC: 31 MMOL/L (ref 23–29)
CREAT SERPL-MCNC: 1.6 MG/DL (ref 0.5–1.4)
DELSYS: ABNORMAL
DELSYS: ABNORMAL
DIFFERENTIAL METHOD: ABNORMAL
EOSINOPHIL # BLD AUTO: 0.2 K/UL (ref 0–0.5)
EOSINOPHIL NFR BLD: 3.2 % (ref 0–8)
ERYTHROCYTE [DISTWIDTH] IN BLOOD BY AUTOMATED COUNT: 16.7 % (ref 11.5–14.5)
EST. GFR  (AFRICAN AMERICAN): 43 ML/MIN/1.73 M^2
EST. GFR  (NON AFRICAN AMERICAN): 37.3 ML/MIN/1.73 M^2
FLOW: 4
GLUCOSE SERPL-MCNC: 132 MG/DL (ref 70–110)
HCO3 UR-SCNC: 33.6 MMOL/L (ref 24–28)
HCO3 UR-SCNC: 34.3 MMOL/L (ref 24–28)
HCT VFR BLD AUTO: 36.7 % (ref 37–48.5)
HGB BLD-MCNC: 10.6 G/DL (ref 12–16)
IMM GRANULOCYTES # BLD AUTO: 0.02 K/UL (ref 0–0.04)
IMM GRANULOCYTES NFR BLD AUTO: 0.4 % (ref 0–0.5)
INR PPP: 2.1 (ref 0.8–1.2)
LDH SERPL L TO P-CCNC: 268 U/L (ref 110–260)
LYMPHOCYTES # BLD AUTO: 0.9 K/UL (ref 1–4.8)
LYMPHOCYTES NFR BLD: 18.8 % (ref 18–48)
MAGNESIUM SERPL-MCNC: 2.3 MG/DL (ref 1.6–2.6)
MCH RBC QN AUTO: 24.8 PG (ref 27–31)
MCHC RBC AUTO-ENTMCNC: 28.9 G/DL (ref 32–36)
MCV RBC AUTO: 86 FL (ref 82–98)
METHEMOGLOBIN: 0.5 % (ref 0–3)
MODE: ABNORMAL
MONOCYTES # BLD AUTO: 0.3 K/UL (ref 0.3–1)
MONOCYTES NFR BLD: 6.9 % (ref 4–15)
NEUTROPHILS # BLD AUTO: 3.4 K/UL (ref 1.8–7.7)
NEUTROPHILS NFR BLD: 69.5 % (ref 38–73)
NRBC BLD-RTO: 0 /100 WBC
PCO2 BLDA: 53.5 MMHG (ref 35–45)
PCO2 BLDA: 58.9 MMHG (ref 35–45)
PH SMN: 7.37 [PH] (ref 7.35–7.45)
PH SMN: 7.41 [PH] (ref 7.35–7.45)
PHOSPHATE SERPL-MCNC: 4.4 MG/DL (ref 2.7–4.5)
PLATELET # BLD AUTO: 307 K/UL (ref 150–350)
PMV BLD AUTO: 10 FL (ref 9.2–12.9)
PO2 BLDA: 31 MMHG (ref 40–60)
PO2 BLDA: 37 MMHG (ref 40–60)
POC BE: 9 MMOL/L
POC BE: 9 MMOL/L
POC SATURATED O2: 58 % (ref 95–100)
POC SATURATED O2: 67 % (ref 95–100)
POC TCO2: 35 MMOL/L (ref 24–29)
POC TCO2: 36 MMOL/L (ref 24–29)
POTASSIUM SERPL-SCNC: 3.9 MMOL/L (ref 3.5–5.1)
PROTHROMBIN TIME: 20.2 SEC (ref 9–12.5)
RBC # BLD AUTO: 4.28 M/UL (ref 4–5.4)
SAMPLE: ABNORMAL
SAMPLE: ABNORMAL
SITE: ABNORMAL
SITE: ABNORMAL
SODIUM SERPL-SCNC: 138 MMOL/L (ref 136–145)
WBC # BLD AUTO: 4.94 K/UL (ref 3.9–12.7)

## 2019-11-28 PROCEDURE — 27000221 HC OXYGEN, UP TO 24 HOURS: Mod: NTX

## 2019-11-28 PROCEDURE — 27200188 HC TRANSDUCER, ART ADULT/PEDS: Mod: NTX

## 2019-11-28 PROCEDURE — 83735 ASSAY OF MAGNESIUM: CPT | Mod: NTX

## 2019-11-28 PROCEDURE — 93750 PR INTERROGATE VENT ASSIST DEV, IN PERSON, W PHYSICIAN ANALYSIS: ICD-10-PCS | Mod: NTX,,, | Performed by: INTERNAL MEDICINE

## 2019-11-28 PROCEDURE — 63600367 HC NITRIC OXIDE PER HOUR: Mod: NTX

## 2019-11-28 PROCEDURE — 99900035 HC TECH TIME PER 15 MIN (STAT): Mod: NTX

## 2019-11-28 PROCEDURE — 85730 THROMBOPLASTIN TIME PARTIAL: CPT | Mod: NTX

## 2019-11-28 PROCEDURE — 25000003 PHARM REV CODE 250: Mod: NTX | Performed by: STUDENT IN AN ORGANIZED HEALTH CARE EDUCATION/TRAINING PROGRAM

## 2019-11-28 PROCEDURE — 84100 ASSAY OF PHOSPHORUS: CPT | Mod: NTX

## 2019-11-28 PROCEDURE — 25000003 PHARM REV CODE 250: Mod: NTX | Performed by: PHYSICIAN ASSISTANT

## 2019-11-28 PROCEDURE — 93750 INTERROGATION VAD IN PERSON: CPT | Mod: NTX,,, | Performed by: INTERNAL MEDICINE

## 2019-11-28 PROCEDURE — 85025 COMPLETE CBC W/AUTO DIFF WBC: CPT | Mod: NTX

## 2019-11-28 PROCEDURE — 20000000 HC ICU ROOM: Mod: NTX

## 2019-11-28 PROCEDURE — 83615 LACTATE (LD) (LDH) ENZYME: CPT | Mod: NTX

## 2019-11-28 PROCEDURE — 94761 N-INVAS EAR/PLS OXIMETRY MLT: CPT | Mod: NTX

## 2019-11-28 PROCEDURE — 63600175 PHARM REV CODE 636 W HCPCS: Mod: NTX | Performed by: PHYSICIAN ASSISTANT

## 2019-11-28 PROCEDURE — 80048 BASIC METABOLIC PNL TOTAL CA: CPT | Mod: NTX

## 2019-11-28 PROCEDURE — 85610 PROTHROMBIN TIME: CPT | Mod: NTX

## 2019-11-28 PROCEDURE — 63600175 PHARM REV CODE 636 W HCPCS: Mod: NTX | Performed by: STUDENT IN AN ORGANIZED HEALTH CARE EDUCATION/TRAINING PROGRAM

## 2019-11-28 PROCEDURE — 25000003 PHARM REV CODE 250: Mod: NTX | Performed by: HOSPITALIST

## 2019-11-28 PROCEDURE — 99233 PR SUBSEQUENT HOSPITAL CARE,LEVL III: ICD-10-PCS | Mod: NTX,,, | Performed by: INTERNAL MEDICINE

## 2019-11-28 PROCEDURE — 27000248 HC VAD-ADDITIONAL DAY: Mod: NTX

## 2019-11-28 PROCEDURE — 82803 BLOOD GASES ANY COMBINATION: CPT | Mod: NTX

## 2019-11-28 PROCEDURE — 99233 SBSQ HOSP IP/OBS HIGH 50: CPT | Mod: NTX,,, | Performed by: INTERNAL MEDICINE

## 2019-11-28 RX ORDER — POTASSIUM CHLORIDE 20 MEQ/1
40 TABLET, EXTENDED RELEASE ORAL 2 TIMES DAILY
Status: DISCONTINUED | OUTPATIENT
Start: 2019-11-28 | End: 2019-12-02

## 2019-11-28 RX ORDER — FUROSEMIDE 10 MG/ML
80 INJECTION INTRAMUSCULAR; INTRAVENOUS ONCE
Status: COMPLETED | OUTPATIENT
Start: 2019-11-28 | End: 2019-11-28

## 2019-11-28 RX ORDER — FUROSEMIDE 40 MG/1
80 TABLET ORAL 2 TIMES DAILY
Status: DISCONTINUED | OUTPATIENT
Start: 2019-11-28 | End: 2019-12-01

## 2019-11-28 RX ORDER — DEXMEDETOMIDINE HYDROCHLORIDE 4 UG/ML
INJECTION, SOLUTION INTRAVENOUS
Status: DISPENSED
Start: 2019-11-28 | End: 2019-11-28

## 2019-11-28 RX ORDER — POTASSIUM CHLORIDE 20 MEQ/1
40 TABLET, EXTENDED RELEASE ORAL ONCE
Status: DISCONTINUED | OUTPATIENT
Start: 2019-11-28 | End: 2019-11-28

## 2019-11-28 RX ADMIN — SPIRONOLACTONE 12.5 MG: 25 TABLET, FILM COATED ORAL at 09:11

## 2019-11-28 RX ADMIN — SENNOSIDES AND DOCUSATE SODIUM 1 TABLET: 8.6; 5 TABLET ORAL at 08:11

## 2019-11-28 RX ADMIN — AMIODARONE HYDROCHLORIDE 0.5 MG/MIN: 1.8 INJECTION, SOLUTION INTRAVENOUS at 01:11

## 2019-11-28 RX ADMIN — VENLAFAXINE 150 MG: 37.5 TABLET ORAL at 08:11

## 2019-11-28 RX ADMIN — MEXILETINE HYDROCHLORIDE 200 MG: 200 CAPSULE ORAL at 05:11

## 2019-11-28 RX ADMIN — POLYETHYLENE GLYCOL 3350 17 G: 17 POWDER, FOR SOLUTION ORAL at 08:11

## 2019-11-28 RX ADMIN — POTASSIUM CHLORIDE 40 MEQ: 1500 TABLET, EXTENDED RELEASE ORAL at 08:11

## 2019-11-28 RX ADMIN — PANTOPRAZOLE SODIUM 40 MG: 40 TABLET, DELAYED RELEASE ORAL at 08:11

## 2019-11-28 RX ADMIN — GABAPENTIN 200 MG: 100 CAPSULE ORAL at 08:11

## 2019-11-28 RX ADMIN — AMIODARONE HYDROCHLORIDE 0.5 MG/MIN: 1.8 INJECTION, SOLUTION INTRAVENOUS at 02:11

## 2019-11-28 RX ADMIN — FUROSEMIDE 80 MG: 10 INJECTION, SOLUTION INTRAMUSCULAR; INTRAVENOUS at 05:11

## 2019-11-28 RX ADMIN — MIRTAZAPINE 15 MG: 15 TABLET, FILM COATED ORAL at 08:11

## 2019-11-28 RX ADMIN — MEXILETINE HYDROCHLORIDE 200 MG: 200 CAPSULE ORAL at 10:11

## 2019-11-28 RX ADMIN — FUROSEMIDE 80 MG: 40 TABLET ORAL at 08:11

## 2019-11-28 RX ADMIN — SENNOSIDES AND DOCUSATE SODIUM 1 TABLET: 8.6; 5 TABLET ORAL at 09:11

## 2019-11-28 RX ADMIN — ASPIRIN 325 MG: 325 TABLET, DELAYED RELEASE ORAL at 08:11

## 2019-11-28 RX ADMIN — MAGNESIUM OXIDE TAB 400 MG (241.3 MG ELEMENTAL MG) 400 MG: 400 (241.3 MG) TAB at 08:11

## 2019-11-28 RX ADMIN — MEXILETINE HYDROCHLORIDE 200 MG: 200 CAPSULE ORAL at 01:11

## 2019-11-28 RX ADMIN — WARFARIN SODIUM 4 MG: 2 TABLET ORAL at 05:11

## 2019-11-28 RX ADMIN — LISINOPRIL 10 MG: 10 TABLET ORAL at 08:11

## 2019-11-28 NOTE — PLAN OF CARE
CMICU DAILY GOALS       A: Awake    RASS: Goal - RASS Goal: 0-->alert and calm  Actual - RASS (Patel Agitation-Sedation Scale): 0-->alert and calm   Restraint necessity:  None  B: Breath   SBT: Not intubated   C: Coordinate A & B, analgesics/sedatives   Pain: managed    SAT: Not intubated  D: Delirium   CAM-ICU: Overall CAM-ICU: Negative  E: Early Mobility   MOVE Screen: Pass   Activity: Activity Management: activity adjusted per tolerance  FAS: Feeding/Nutrition   Diet order: Diet/Nutrition Received: low saturated fat/low cholesterol, no added salt, 2 gram sodium, other (see comments)(cardiac diet),   Fluid restriction: Fluid Requirement: 1500FR  T: Thrombus   DVT prophylaxis: VTE Required Core Measure: Pharmacological prophylaxis initiated/maintained  H: HOB Elevation   Head of Bed (HOB): HOB at 30-45 degrees  U: Ulcer Prophylaxis   GI: yes  G: Glucose control   managed    S: Skin   Bundle compliance: yes   Bathing/Skin Care: bath, chlorhexidine, bath, complete, dressed/undressed, linen changed, other (see comments)(patient educated on CHG bathing) Date: 11/26/19  B: Bowel Function   no issues   I: Indwelling Catheters   Fonseca necessity:  None   CVC necessity: Yes   IPAD offered: Yes  D: De-escalation Antibx   Yes  Plan for the day   Diurese, monitor for VTACH events  Family/Goals of care/Code Status   Code Status: Full Code     No acute events throughout day, VS and assessment per flow sheet, patient progressing towards goals as tolerated, plan of care reviewed with Deborah Navas and family, all concerns addressed, will continue to monitor.

## 2019-11-28 NOTE — NURSING
Pt's CVP 16 from 10 earlier in shift. Notified MD Chio with HTS. Stated to check CVP around 0300 and call back with an update. No new orders at this time. CARLOS A, KIM.

## 2019-11-28 NOTE — SUBJECTIVE & OBJECTIVE
Interval History: Pt CVP jumped to 18 last night. Received IV lasix 80 mg this morning at 5. CVP at 9 Am is 12. Mixed venous 67. CO- 8.8 and CI- 3.9. No more VTs .     Continuous Infusions:   sodium chloride 0.9%      amiodarone in dextrose 5% 0.5 mg/min (11/28/19 0700)    epinephrine 0.02 mcg/kg/min (11/28/19 0700)    nitric oxide gas       Scheduled Meds:   aspirin  325 mg Oral Daily    dexMEDEtomidine in 0.9 % NaCl        furosemide  80 mg Intravenous BID    gabapentin  200 mg Oral BID    hepatitis A virus vaccine (PF)  1,440 Units Intramuscular Once    lisinopril  10 mg Oral BID    magnesium oxide  400 mg Oral Daily    mexiletine  200 mg Oral Q8H    mirtazapine  15 mg Oral QHS    pantoprazole  40 mg Oral Daily    polyethylene glycol  17 g Oral Daily    potassium chloride  40 mEq Oral BID    senna-docusate 8.6-50 mg  1 tablet Oral BID    spironolactone  12.5 mg Oral Daily    venlafaxine  150 mg Oral Daily    warfarin  4 mg Oral Daily     PRN Meds:sodium chloride 0.9%, ALPRAZolam, pneumoc 13-amber conj-dip cr(PF), promethazine, senna-docusate 8.6-50 mg, zolpidem    Review of patient's allergies indicates:   Allergen Reactions    Adhesive Blisters     Reaction to area in chest and up only    Codeine Itching     Objective:     Vital Signs (Most Recent):  Temp: 97.3 °F (36.3 °C) (11/28/19 0300)  Pulse: 82 (11/28/19 0900)  Resp: 20 (11/28/19 0900)  BP: (!) 92/0 (11/28/19 0700)  SpO2: 99 % (11/28/19 0700) Vital Signs (24h Range):  Temp:  [97.1 °F (36.2 °C)-97.8 °F (36.6 °C)] 97.3 °F (36.3 °C)  Pulse:  [] 82  Resp:  [12-34] 20  SpO2:  [95 %-99 %] 99 %  BP: (86-92)/(0-63) 92/0     Patient Vitals for the past 72 hrs (Last 3 readings):   Weight   11/27/19 1300 102.1 kg (225 lb)   11/27/19 0533 102.3 kg (225 lb 8.5 oz)   11/26/19 0500 102 kg (224 lb 13.9 oz)     Body mass index is 35.24 kg/m².      Intake/Output Summary (Last 24 hours) at 11/28/2019 0909  Last data filed at 11/28/2019 0700  Gross  per 24 hour   Intake 1266.4 ml   Output 3000 ml   Net -1733.6 ml       Hemodynamic Parameters:       Telemetry: Paced     Physical Exam  Constitutional: She is oriented to person, place, and time. She appears well-developed and well-nourished.   HENT:   Mouth/Throat: Oropharynx is clear and moist.   Eyes: EOM are normal.   Neck: No JVD present.   Cardiovascular: Normal rate, regular rhythm and intact distal pulses.   Smooth VAD hum   Pulmonary/Chest: Effort normal and breath sounds normal. No respiratory distress. She has no rales.   Abdominal: Soft. Bowel sounds are normal. She exhibits no distension. There is no tenderness. There is no guarding.   Musculoskeletal: She exhibits no edema.   Neurological: She is alert and oriented to person, place, and time.   Skin: Skin is warm.   Psychiatric: She has a normal mood and affect.   Nursing note and vitals reviewed.  Significant Labs:  CBC:  Recent Labs   Lab 11/26/19  0306 11/27/19 0319 11/28/19  0514   WBC 5.27 5.18 4.94   RBC 4.49 4.53 4.28   HGB 11.0* 11.0* 10.6*   HCT 38.5 38.5 36.7*    318 307   MCV 86 85 86   MCH 24.5* 24.3* 24.8*   MCHC 28.6* 28.6* 28.9*     BNP:  Recent Labs   Lab 11/22/19  0349 11/25/19  0357 11/27/19 0319   * 192* 217*     CMP:  Recent Labs   Lab 11/22/19  0349 11/25/19  0357  11/26/19  0306 11/27/19  0319 11/28/19  0514   *   < > 140*   < > 149* 149* 132*   CALCIUM 9.3   < > 9.7   < > 9.6 9.6 9.3   ALBUMIN 3.3*  --  3.5  --   --  3.4*  --    PROT 6.9  --  7.0  --   --  6.9  --       < > 141   < > 139 140 138   K 4.0   < > 4.0   < > 4.4 4.2 3.9   CO2 29   < > 29   < > 31* 29 31*      < > 100   < > 99 101 99   BUN 37*   < > 31*   < > 32* 29* 28*   CREATININE 2.0*   < > 1.5*   < > 1.7* 1.7* 1.6*   ALKPHOS 92  --  90  --   --  94  --    ALT 18  --  15  --   --  16  --    AST 21  --  19  --   --  21  --    BILITOT 0.5  --  0.5  --   --  0.3  --     < > = values in this interval not displayed.      Coagulation:    Recent Labs   Lab 11/26/19  0306 11/27/19 0319 11/28/19  0514   INR 1.9* 1.9* 2.1*   APTT 33.3* 33.0* 35.4*     LDH:  Recent Labs   Lab 11/26/19  0306 11/27/19 0319 11/28/19  0514    207 268*     Microbiology:  Microbiology Results (last 7 days)     ** No results found for the last 168 hours. **          I have reviewed all pertinent labs within the past 24 hours.    Estimated Creatinine Clearance: 51.7 mL/min (A) (based on SCr of 1.6 mg/dL (H)).    Diagnostic Results:  I have reviewed and interpreted all pertinent imaging results/findings within the past 24 hours.

## 2019-11-28 NOTE — PLAN OF CARE
Update:    Was called by patient's nurse given increase in CVP from 10 -> 16. Rechecked CVP was 18. Has urinated 1L since 1900. Currently scheduled to receive Lasix 80 mg IV BID starting tomorrow.    Net I/Os: -1.3L over the prior 24 hours. Cr 1.7.    Plan:  - Will redose with Lasix 80 mg IV today and monitor UOP, CVP closely  - Plan to continue scheduled Lasix 80 mg IV BID as well    Plan discussed with heart failure fellow.    Lucille Barroso MD  Cardiology - PGY4  Pager [phone]\

## 2019-11-28 NOTE — ASSESSMENT & PLAN NOTE
-HeartMate 3 Implanted 9/10/19 as DT.  -Implanted by Dr. Walden  -INR sub therapeutic. Goal INR 2.0-3.0.Boost Coumadin.   - Previous home dose was 3mg on Wed and 1.5mg QDaily  -Antiplatelets:  mg.  -LDH is stable Will monitor daily.   -Speed set at 5200. Decreased 11/25 in setting of Vt with ICD shocks associated with valsalva and small LV.  Decreased to 5100 yesterday after reviewing Echo.   - On lasix 80mg BID, epi 0.02. Will wean epi to 0.01 and cont to monitor   -Not listed for OHTx: was declined due to pulmonary hypertension and incomplete care giving plan. Patient now has care giving plan. 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 Urgent selection 11/26, approved for transplant. Awaiting listing (insurance approval).    Procedure: Device Interrogation Including analysis of device parameters  Current Settings: Ventricular Assist Device  Review of device function is stable    TXP LVAD INTERROGATIONS 11/28/2019 11/28/2019 11/28/2019 11/28/2019 11/28/2019 11/28/2019 11/28/2019   Type HeartMate3 HeartMate3 HeartMate3 HeartMate3 HeartMate3 HeartMate3 HeartMate3   Flow 4.2 4 3.8 4.3 3.8 3.9 3.8   Speed 5100 5100 5100 5100 5100 5100 5100   PI 3.1 4.2 4.4 3 4.6 3.9 4.6   Power (Orellana) 3.5 3.5 3.5 3.4 3.6 3.5 3.4   LSL 4700 4700 4700 4700 4700 4700 4700   Pulsatility Intermittent pulse Intermittent pulse Intermittent pulse Intermittent pulse Intermittent pulse Intermittent pulse Intermittent pulse

## 2019-11-28 NOTE — PROGRESS NOTES
[facility]  Heart Transplant  Progress Note    Patient Name: Deborah Navas  MRN: [id number]  Admission Date: 11/13/2019  Hospital Length of Stay: 13 days  Attending Physician: Vi Bryant MD  Primary Care Provider: Primary Doctor No  Principal Problem:Left ventricular assist device (LVAD) complication    Subjective:     Interval History: Pt CVP jumped to 18 last night. Received IV lasix 80 mg this morning at 5. CVP at 9 Am is 12. Mixed venous 67. CO- 8.8 and CI- 3.9. No more VTs .     Continuous Infusions:   sodium chloride 0.9%      amiodarone in dextrose 5% 0.5 mg/min (11/28/19 0700)    epinephrine 0.02 mcg/kg/min (11/28/19 0700)    nitric oxide gas       Scheduled Meds:   aspirin  325 mg Oral Daily    dexMEDEtomidine in 0.9 % NaCl        furosemide  80 mg Intravenous BID    gabapentin  200 mg Oral BID    hepatitis A virus vaccine (PF)  1,440 Units Intramuscular Once    lisinopril  10 mg Oral BID    magnesium oxide  400 mg Oral Daily    mexiletine  200 mg Oral Q8H    mirtazapine  15 mg Oral QHS    pantoprazole  40 mg Oral Daily    polyethylene glycol  17 g Oral Daily    potassium chloride  40 mEq Oral BID    senna-docusate 8.6-50 mg  1 tablet Oral BID    spironolactone  12.5 mg Oral Daily    venlafaxine  150 mg Oral Daily    warfarin  4 mg Oral Daily     PRN Meds:sodium chloride 0.9%, ALPRAZolam, pneumoc 13-amber conj-dip cr(PF), promethazine, senna-docusate 8.6-50 mg, zolpidem    Review of patient's allergies indicates:   Allergen Reactions    Adhesive Blisters     Reaction to area in chest and up only    Codeine Itching     Objective:     Vital Signs (Most Recent):  Temp: 97.3 °F (36.3 °C) (11/28/19 0300)  Pulse: 82 (11/28/19 0900)  Resp: 20 (11/28/19 0900)  BP: (!) 92/0 (11/28/19 0700)  SpO2: 99 % (11/28/19 0700) Vital Signs (24h Range):  Temp:  [97.1 °F (36.2 °C)-97.8 °F (36.6 °C)] 97.3 °F (36.3 °C)  Pulse:  [] 82  Resp:  [12-34] 20  SpO2:  [95 %-99  %] 99 %  BP: (86-92)/(0-63) 92/0     Patient Vitals for the past 72 hrs (Last 3 readings):   Weight   11/27/19 1300 102.1 kg (225 lb)   11/27/19 0533 102.3 kg (225 lb 8.5 oz)   11/26/19 0500 102 kg (224 lb 13.9 oz)     Body mass index is 35.24 kg/m².      Intake/Output Summary (Last 24 hours) at 11/28/2019 0909  Last data filed at 11/28/2019 0700  Gross per 24 hour   Intake 1266.4 ml   Output 3000 ml   Net -1733.6 ml       Hemodynamic Parameters:       Telemetry: Paced     Physical Exam  Constitutional: She is oriented to person, place, and time. She appears well-developed and well-nourished.   HENT:   Mouth/Throat: Oropharynx is clear and moist.   Eyes: EOM are normal.   Neck: No JVD present.   Cardiovascular: Normal rate, regular rhythm and intact distal pulses.   Smooth VAD hum   Pulmonary/Chest: Effort normal and breath sounds normal. No respiratory distress. She has no rales.   Abdominal: Soft. Bowel sounds are normal. She exhibits no distension. There is no tenderness. There is no guarding.   Musculoskeletal: She exhibits no edema.   Neurological: She is alert and oriented to person, place, and time.   Skin: Skin is warm.   Psychiatric: She has a normal mood and affect.   Nursing note and vitals reviewed.  Significant Labs:  CBC:  Recent Labs   Lab 11/26/19  0306 11/27/19 0319 11/28/19  0514   WBC 5.27 5.18 4.94   RBC 4.49 4.53 4.28   HGB 11.0* 11.0* 10.6*   HCT 38.5 38.5 36.7*    318 307   MCV 86 85 86   MCH 24.5* 24.3* 24.8*   MCHC 28.6* 28.6* 28.9*     BNP:  Recent Labs   Lab 11/22/19  0349 11/25/19  0357 11/27/19 0319   * 192* 217*     CMP:  Recent Labs   Lab 11/22/19  0349 11/25/19  0357  11/26/19  0306 11/27/19  0319 11/28/19  0514   *   < > 140*   < > 149* 149* 132*   CALCIUM 9.3   < > 9.7   < > 9.6 9.6 9.3   ALBUMIN 3.3*  --  3.5  --   --  3.4*  --    PROT 6.9  --  7.0  --   --  6.9  --       < > 141   < > 139 140 138   K 4.0   < > 4.0   < > 4.4 4.2 3.9   CO2 29   < > 29    < > 31* 29 31*      < > 100   < > 99 101 99   BUN 37*   < > 31*   < > 32* 29* 28*   CREATININE 2.0*   < > 1.5*   < > 1.7* 1.7* 1.6*   ALKPHOS 92  --  90  --   --  94  --    ALT 18  --  15  --   --  16  --    AST 21  --  19  --   --  21  --    BILITOT 0.5  --  0.5  --   --  0.3  --     < > = values in this interval not displayed.      Coagulation:   Recent Labs   Lab 11/26/19  0306 11/27/19 0319 11/28/19  0514   INR 1.9* 1.9* 2.1*   APTT 33.3* 33.0* 35.4*     LDH:  Recent Labs   Lab 11/26/19 0306 11/27/19 0319 11/28/19  0514    207 268*     Microbiology:  Microbiology Results (last 7 days)     ** No results found for the last 168 hours. **          I have reviewed all pertinent labs within the past 24 hours.    Estimated Creatinine Clearance: 51.7 mL/min (A) (based on SCr of 1.6 mg/dL (H)).    Diagnostic Results:  I have reviewed and interpreted all pertinent imaging results/findings within the past 24 hours.    Assessment and Plan:     48 yo WF with NICM, s/p HM3 implantation 9/10/19 (post up coarse uncomplicated with the exception of+ diaphragmatic stimulation of LV lead and pleural effusion with chest tube placement, atrial flutter with NADINE/DCCV), V-fib reported in setting of hypokalemia with appropriate shock from ICD on Amiodarone prior to LVAD placement; and recent hospitalizations for ventricular arrythmias; started on Mexilitine.  She was seen in EP clinic today and she continues to have multiple VT episodes with some ATP therapy, although no ICD shocks since starting mexiletine 10/24/2019. One slow VT episode 2.5hrs 11/3/2019. Patient asymptomatic with LVAD. On coumadin. No AFL since post-op event (paced out of rhythm 9/24/2019). CHB with 100% V pacing (LV lead off). She does not feel well. Dry heaving with mexiletine. Taking phenergan PRN. 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   She was seen in HTS clinic and reported 4 low flow alarms the last 4  nights.  She reports they occur in her sleep and last for only a few seconds.  By the time she wakes up; they quickly stop.  She does report to possibly being little volume overloaded.  She hasn't noticed weight gain at home but thinks she may have little extra fluid.  She denies SOB, chest pain, palpitations, LEGER. In review of her records: she was 223lbs on 10/24/19 during previous hospitalization and is 235lbs now.  Her lasix had previously been decreased to prn.     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    Essential hypertension  -Patient is non-pulsatile  -Doppler goal 60-90 mmHg  -Antihypertensive medications include  Lisinopril  -Hydralazine was started on evening of admit and then changed to low dose Norvasc and Spironolactone to aid with BP and low K+.  Hesitated on increasing Lisinopril because of active diuresis and concern for fluctuation of creatinine    - amlodipine now on hold in setting of symptomatic hypotension and LFAs on 11/16      Ventricular tachycardia  - Per EP office visit on day of admission: She continues to have multiple VT episodes with some ATP therapy, although no ICD shocks since starting mexiletine 10/24/2019. One slow VT episode 2.5hrs 11/3/2019. Patient asymptomatic with LVAD. On coumadin. No AFL since post-op event (paced out of rhythm 9/24/2019). CHB with 100% V pacing (LV lead off).   - Plan was to continue current regimen despite Mexiletine making her nauseous.  - Decreased Amiodarone to daily  per EP plan on discharge last hospitalization   - Shocked x 3 11/25 for MMVT   - continues on amio ggt s/p amio load and increased mexiltine to 200 per EP. Still following, appreciate assistance     LVAD (left ventricular assist device) present  -HeartMate 3 Implanted 9/10/19 as DT.  -Implanted by Dr. Walden  -INR sub therapeutic. Goal INR 2.0-3.0.Boost Coumadin.   - Previous home dose was 3mg on Wed and 1.5mg QDaily  -Antiplatelets:  mg.  -LDH is stable Will monitor daily.   -Speed set at 5200. Decreased 11/25 in setting of Vt with ICD shocks associated with valsalva and small LV.  Decreased to 5100 yesterday after reviewing Echo.   - On lasix 80mg BID, epi 0.02. Will wean epi to 0.01     -Not listed for OHTx: was declined due to pulmonary hypertension and incomplete care giving plan. Patient now has care giving plan. 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 Urgent selection 11/26, approved for transplant. Awaiting listing (insurance approval).    Procedure: Device Interrogation Including analysis of device parameters  Current Settings: Ventricular Assist Device  Review of device function is stable    TXP LVAD INTERROGATIONS 11/28/2019 11/28/2019 11/28/2019 11/28/2019 11/28/2019 11/28/2019 11/28/2019   Type HeartMate3 HeartMate3 HeartMate3 HeartMate3 HeartMate3 HeartMate3 HeartMate3   Flow 4.2 4 3.8 4.3 3.8 3.9 3.8   Speed 5100 5100 5100 5100 5100 5100 5100   PI 3.1 4.2 4.4 3 4.6 3.9 4.6   Power (Orellana) 3.5 3.5 3.5 3.4 3.6 3.5 3.4   LSL 4700 4700 4700 4700 4700 4700 4700   Pulsatility Intermittent pulse Intermittent pulse Intermittent pulse Intermittent pulse Intermittent pulse Intermittent pulse Intermittent pulse       CKD (chronic kidney disease)  - Baseline creat appears 1.3-1.6  - Will monitor with diuresis     Acute on chronic combined systolic and diastolic heart failure  -NICM  -Last 2D Echo 11/25/19. Severe TR, IVC 8, normal RV function?, LV/RV dimensions 4.5/3.9 @ 5300. Dr arroyo to  review. Will repeat again today due to VT with ATP while on bedside commode (3x episodes of VT while on commode) at decreased speed of 5200  -lasix on hold per Dr Bryant- likely needs CVP slightly higher (10-12?) due to renal function and HM3 with restrictive filling? Monitor closely   - now on EPI .02 and NO @ 10 and SvO2 67    - CVP 13 this morning. CO/CI 8.8/3.9. Decrease Epi to 0.01  - GDMT: Continue ACE 10 BID, aldactone 12.5   - was on lasix 20 PRN at home PTA  -2g Na dietary restriction, 1500 mL fluid restriction, strict I/Os      ICD (implantable cardioverter-defibrillator) in place  - [employer]onic ICD  - Shocked x 3 11/25 for MMVT   - continues on amio ggt s/p amio load and increased mexiltine to 200 per EP. Still following, appreciate assistance   - See above VT        Dandre Hunter MD  Heart Transplant  [facility]

## 2019-11-28 NOTE — NURSING TRANSFER
Nursing Transfer Note      11/28/2019     Transfer To: [id number]    Transfer via bed    Transfer with cardiac monitoring, epinephrine gtt, amiodarone gtt, and Nitric Oxide with 4L NC    Transported by Lisandro RN, Rashida SINGH, and Zita PCT    Medicines sent: Epi gtt, Amiodarone gtt, and Coumadin tablets    Chart send with patient: Yes    Notified: friend at bedside    Patient reassessed at: 11/28/19 @ 1045    Upon arrival to floor: cardiac monitor applied, patient oriented to room, call bell in reach and bed in lowest position. VAD equipment reconciled. Connected to power module. No complications during transport

## 2019-11-28 NOTE — PLAN OF CARE
CMICU DAILY GOALS       A: Awake    RASS: Goal - RASS Goal: 0-->alert and calm  Actual - RASS (Patel Agitation-Sedation Scale): 0-->alert and calm   Restraint necessity: no  B: Breath   SBT: NA   C: Coordinate A & B, analgesics/sedatives   Pain: managed    SAT: NA  D: Delirium   CAM-ICU: Overall CAM-ICU: Negative  E: Early Mobility   MOVE Screen: Pass   Activity: Activity Management: activity adjusted per tolerance  FAS: Feeding/Nutrition   Diet order: Diet/Nutrition Received: low saturated fat/low cholesterol, 2 gram sodium,   Fluid restriction: Fluid Requirement: 1500 cc FR  T: Thrombus   DVT prophylaxis: VTE Required Core Measure: Pharmacological prophylaxis initiated/maintained  H: HOB Elevation   Head of Bed (HOB): HOB flat  U: Ulcer Prophylaxis   GI: yes  G: Glucose control   managed    S: Skin   Bundle compliance: yes   Bathing/Skin Care: bath, chlorhexidine, dressed/undressed, linen changed Date: 11/27 PM shift, [facility] bath  B: Bowel Function   no issues   I: Indwelling Catheters   Fonseca necessity: no   CVC necessity: Yes   IPAD offered: No  D: De-escalation Antibx   No  Plan for the day   Wean nitric and epi as tolerated, monitor CVP and UO  Family/Goals of care/Code Status   Code Status: Full Code     No acute events throughout day, VS and assessment per flow sheet, patient progressing towards goals as tolerated, plan of care reviewed with Deborah Navas and family, all concerns addressed, will continue to monitor.

## 2019-11-29 LAB
ALBUMIN SERPL BCP-MCNC: 3.3 G/DL (ref 3.5–5.2)
ALLENS TEST: ABNORMAL
ALP SERPL-CCNC: 96 U/L (ref 55–135)
ALT SERPL W/O P-5'-P-CCNC: 16 U/L (ref 10–44)
ANION GAP SERPL CALC-SCNC: 12 MMOL/L (ref 8–16)
APTT BLDCRRT: 36.5 SEC (ref 21–32)
AST SERPL-CCNC: 23 U/L (ref 10–40)
BASOPHILS # BLD AUTO: 0.05 K/UL (ref 0–0.2)
BASOPHILS NFR BLD: 1 % (ref 0–1.9)
BILIRUB DIRECT SERPL-MCNC: 0.2 MG/DL (ref 0.1–0.3)
BILIRUB SERPL-MCNC: 0.2 MG/DL (ref 0.1–1)
BNP SERPL-MCNC: 161 PG/ML (ref 0–99)
BUN SERPL-MCNC: 28 MG/DL (ref 6–20)
CALCIUM SERPL-MCNC: 9.6 MG/DL (ref 8.7–10.5)
CHLORIDE SERPL-SCNC: 104 MMOL/L (ref 95–110)
CO2 SERPL-SCNC: 25 MMOL/L (ref 23–29)
CREAT SERPL-MCNC: 1.7 MG/DL (ref 0.5–1.4)
CRP SERPL-MCNC: 12.2 MG/L (ref 0–8.2)
DELSYS: ABNORMAL
DIFFERENTIAL METHOD: ABNORMAL
EOSINOPHIL # BLD AUTO: 0.2 K/UL (ref 0–0.5)
EOSINOPHIL NFR BLD: 4 % (ref 0–8)
ERYTHROCYTE [DISTWIDTH] IN BLOOD BY AUTOMATED COUNT: 16.7 % (ref 11.5–14.5)
ERYTHROCYTE [SEDIMENTATION RATE] IN BLOOD BY WESTERGREN METHOD: 14 MM/H
EST. GFR  (AFRICAN AMERICAN): 40 ML/MIN/1.73 M^2
EST. GFR  (NON AFRICAN AMERICAN): 34.7 ML/MIN/1.73 M^2
FIO2: 36
FIO2: 36
FLOW: 4
GLUCOSE SERPL-MCNC: 127 MG/DL (ref 70–110)
HCO3 UR-SCNC: 31.9 MMOL/L (ref 24–28)
HCO3 UR-SCNC: 33.3 MMOL/L (ref 24–28)
HCO3 UR-SCNC: 33.7 MMOL/L (ref 24–28)
HCO3 UR-SCNC: 36 MMOL/L (ref 24–28)
HCT VFR BLD AUTO: 38.7 % (ref 37–48.5)
HGB BLD-MCNC: 11.2 G/DL (ref 12–16)
IMM GRANULOCYTES # BLD AUTO: 0.01 K/UL (ref 0–0.04)
IMM GRANULOCYTES NFR BLD AUTO: 0.2 % (ref 0–0.5)
INR PPP: 2.4 (ref 0.8–1.2)
LDH SERPL L TO P-CCNC: 252 U/L (ref 110–260)
LYMPHOCYTES # BLD AUTO: 1.2 K/UL (ref 1–4.8)
LYMPHOCYTES NFR BLD: 23.8 % (ref 18–48)
MAGNESIUM SERPL-MCNC: 2.3 MG/DL (ref 1.6–2.6)
MCH RBC QN AUTO: 24.7 PG (ref 27–31)
MCHC RBC AUTO-ENTMCNC: 28.9 G/DL (ref 32–36)
MCV RBC AUTO: 85 FL (ref 82–98)
METHEMOGLOBIN: 0.3 % (ref 0–3)
MODE: ABNORMAL
MONOCYTES # BLD AUTO: 0.4 K/UL (ref 0.3–1)
MONOCYTES NFR BLD: 7.2 % (ref 4–15)
NEUTROPHILS # BLD AUTO: 3.2 K/UL (ref 1.8–7.7)
NEUTROPHILS NFR BLD: 63.8 % (ref 38–73)
NRBC BLD-RTO: 0 /100 WBC
PCO2 BLDA: 46.3 MMHG (ref 35–45)
PCO2 BLDA: 53.2 MMHG (ref 35–45)
PCO2 BLDA: 54.1 MMHG (ref 35–45)
PCO2 BLDA: 55.8 MMHG (ref 35–45)
PH SMN: 7.39 [PH] (ref 7.35–7.45)
PH SMN: 7.4 [PH] (ref 7.35–7.45)
PH SMN: 7.43 [PH] (ref 7.35–7.45)
PH SMN: 7.45 [PH] (ref 7.35–7.45)
PHOSPHATE SERPL-MCNC: 4.1 MG/DL (ref 2.7–4.5)
PLATELET # BLD AUTO: 321 K/UL (ref 150–350)
PMV BLD AUTO: 9.9 FL (ref 9.2–12.9)
PO2 BLDA: 27 MMHG (ref 40–60)
PO2 BLDA: 28 MMHG (ref 40–60)
PO2 BLDA: 28 MMHG (ref 40–60)
PO2 BLDA: 35 MMHG (ref 40–60)
POC BE: 12 MMOL/L
POC BE: 8 MMOL/L
POC BE: 9 MMOL/L
POC BE: 9 MMOL/L
POC SATURATED O2: 49 % (ref 95–100)
POC SATURATED O2: 52 % (ref 95–100)
POC SATURATED O2: 55 % (ref 95–100)
POC SATURATED O2: 65 % (ref 95–100)
POC TCO2: 33 MMOL/L (ref 24–29)
POC TCO2: 35 MMOL/L (ref 24–29)
POC TCO2: 35 MMOL/L (ref 24–29)
POC TCO2: 38 MMOL/L (ref 24–29)
POTASSIUM SERPL-SCNC: 4.6 MMOL/L (ref 3.5–5.1)
PREALB SERPL-MCNC: 30 MG/DL (ref 20–43)
PROT SERPL-MCNC: 7 G/DL (ref 6–8.4)
PROTHROMBIN TIME: 23 SEC (ref 9–12.5)
RBC # BLD AUTO: 4.53 M/UL (ref 4–5.4)
SAMPLE: ABNORMAL
SITE: ABNORMAL
SODIUM SERPL-SCNC: 141 MMOL/L (ref 136–145)
WBC # BLD AUTO: 5 K/UL (ref 3.9–12.7)

## 2019-11-29 PROCEDURE — 25000003 PHARM REV CODE 250: Mod: NTX | Performed by: STUDENT IN AN ORGANIZED HEALTH CARE EDUCATION/TRAINING PROGRAM

## 2019-11-29 PROCEDURE — 85610 PROTHROMBIN TIME: CPT | Mod: NTX

## 2019-11-29 PROCEDURE — 83880 ASSAY OF NATRIURETIC PEPTIDE: CPT | Mod: NTX

## 2019-11-29 PROCEDURE — 85025 COMPLETE CBC W/AUTO DIFF WBC: CPT | Mod: NTX

## 2019-11-29 PROCEDURE — 83050 HGB METHEMOGLOBIN QUAN: CPT | Mod: NTX

## 2019-11-29 PROCEDURE — 25000003 PHARM REV CODE 250: Mod: NTX | Performed by: PHYSICIAN ASSISTANT

## 2019-11-29 PROCEDURE — 86140 C-REACTIVE PROTEIN: CPT | Mod: NTX

## 2019-11-29 PROCEDURE — 27000221 HC OXYGEN, UP TO 24 HOURS: Mod: NTX

## 2019-11-29 PROCEDURE — 63600367 HC NITRIC OXIDE PER HOUR: Mod: NTX

## 2019-11-29 PROCEDURE — 82803 BLOOD GASES ANY COMBINATION: CPT | Mod: NTX

## 2019-11-29 PROCEDURE — 80048 BASIC METABOLIC PNL TOTAL CA: CPT | Mod: NTX

## 2019-11-29 PROCEDURE — 27000248 HC VAD-ADDITIONAL DAY: Mod: NTX

## 2019-11-29 PROCEDURE — 63600175 PHARM REV CODE 636 W HCPCS: Mod: NTX | Performed by: PHYSICIAN ASSISTANT

## 2019-11-29 PROCEDURE — 83615 LACTATE (LD) (LDH) ENZYME: CPT | Mod: NTX

## 2019-11-29 PROCEDURE — 80076 HEPATIC FUNCTION PANEL: CPT | Mod: NTX

## 2019-11-29 PROCEDURE — 99291 CRITICAL CARE FIRST HOUR: CPT | Mod: NTX,,, | Performed by: INTERNAL MEDICINE

## 2019-11-29 PROCEDURE — 93750 INTERROGATION VAD IN PERSON: CPT | Mod: NTX,,, | Performed by: INTERNAL MEDICINE

## 2019-11-29 PROCEDURE — 84134 ASSAY OF PREALBUMIN: CPT | Mod: NTX

## 2019-11-29 PROCEDURE — 83735 ASSAY OF MAGNESIUM: CPT | Mod: NTX

## 2019-11-29 PROCEDURE — 25000003 PHARM REV CODE 250: Mod: NTX | Performed by: HOSPITALIST

## 2019-11-29 PROCEDURE — 94761 N-INVAS EAR/PLS OXIMETRY MLT: CPT | Mod: NTX

## 2019-11-29 PROCEDURE — 84100 ASSAY OF PHOSPHORUS: CPT | Mod: NTX

## 2019-11-29 PROCEDURE — 99291 PR CRITICAL CARE, E/M 30-74 MINUTES: ICD-10-PCS | Mod: NTX,,, | Performed by: INTERNAL MEDICINE

## 2019-11-29 PROCEDURE — 93750 PR INTERROGATE VENT ASSIST DEV, IN PERSON, W PHYSICIAN ANALYSIS: ICD-10-PCS | Mod: NTX,,, | Performed by: INTERNAL MEDICINE

## 2019-11-29 PROCEDURE — 99900035 HC TECH TIME PER 15 MIN (STAT): Mod: NTX

## 2019-11-29 PROCEDURE — 20000000 HC ICU ROOM: Mod: NTX

## 2019-11-29 PROCEDURE — 85730 THROMBOPLASTIN TIME PARTIAL: CPT | Mod: NTX

## 2019-11-29 PROCEDURE — 63600175 PHARM REV CODE 636 W HCPCS: Mod: NTX | Performed by: HOSPITALIST

## 2019-11-29 RX ORDER — AMIODARONE HYDROCHLORIDE 200 MG/1
400 TABLET ORAL 2 TIMES DAILY
Status: DISCONTINUED | OUTPATIENT
Start: 2019-11-29 | End: 2019-12-03

## 2019-11-29 RX ORDER — AMIODARONE HYDROCHLORIDE 200 MG/1
400 TABLET ORAL DAILY
Status: DISCONTINUED | OUTPATIENT
Start: 2019-11-29 | End: 2019-11-29

## 2019-11-29 RX ORDER — PANTOPRAZOLE SODIUM 40 MG/1
40 TABLET, DELAYED RELEASE ORAL 2 TIMES DAILY
Status: DISCONTINUED | OUTPATIENT
Start: 2019-11-29 | End: 2019-12-03

## 2019-11-29 RX ORDER — SUCRALFATE 1 G/10ML
1 SUSPENSION ORAL EVERY 6 HOURS
Status: DISCONTINUED | OUTPATIENT
Start: 2019-11-29 | End: 2019-11-29

## 2019-11-29 RX ORDER — SUCRALFATE 1 G/10ML
1 SUSPENSION ORAL EVERY 6 HOURS PRN
Status: DISCONTINUED | OUTPATIENT
Start: 2019-11-29 | End: 2019-12-03

## 2019-11-29 RX ADMIN — ZOLPIDEM TARTRATE 5 MG: 5 TABLET ORAL at 10:11

## 2019-11-29 RX ADMIN — MEXILETINE HYDROCHLORIDE 200 MG: 200 CAPSULE ORAL at 10:11

## 2019-11-29 RX ADMIN — LISINOPRIL 10 MG: 10 TABLET ORAL at 08:11

## 2019-11-29 RX ADMIN — FUROSEMIDE 80 MG: 40 TABLET ORAL at 05:11

## 2019-11-29 RX ADMIN — AMIODARONE HYDROCHLORIDE 400 MG: 200 TABLET ORAL at 10:11

## 2019-11-29 RX ADMIN — MEXILETINE HYDROCHLORIDE 200 MG: 200 CAPSULE ORAL at 03:11

## 2019-11-29 RX ADMIN — PANTOPRAZOLE SODIUM 40 MG: 40 TABLET, DELAYED RELEASE ORAL at 08:11

## 2019-11-29 RX ADMIN — AMIODARONE HYDROCHLORIDE 400 MG: 200 TABLET ORAL at 08:11

## 2019-11-29 RX ADMIN — POTASSIUM CHLORIDE 40 MEQ: 1500 TABLET, EXTENDED RELEASE ORAL at 08:11

## 2019-11-29 RX ADMIN — SENNOSIDES AND DOCUSATE SODIUM 1 TABLET: 8.6; 5 TABLET ORAL at 08:11

## 2019-11-29 RX ADMIN — MIRTAZAPINE 15 MG: 15 TABLET, FILM COATED ORAL at 08:11

## 2019-11-29 RX ADMIN — MAGNESIUM OXIDE TAB 400 MG (241.3 MG ELEMENTAL MG) 400 MG: 400 (241.3 MG) TAB at 08:11

## 2019-11-29 RX ADMIN — PROMETHAZINE HYDROCHLORIDE 12.5 MG: 12.5 TABLET ORAL at 09:11

## 2019-11-29 RX ADMIN — WARFARIN SODIUM 4 MG: 2 TABLET ORAL at 05:11

## 2019-11-29 RX ADMIN — ALUMINUM HYDROXIDE, MAGNESIUM HYDROXIDE, AND SIMETHICONE: 200; 200; 20 SUSPENSION ORAL at 07:11

## 2019-11-29 RX ADMIN — ALPRAZOLAM 0.25 MG: 0.25 TABLET ORAL at 12:11

## 2019-11-29 RX ADMIN — FUROSEMIDE 80 MG: 40 TABLET ORAL at 08:11

## 2019-11-29 RX ADMIN — POLYETHYLENE GLYCOL 3350 17 G: 17 POWDER, FOR SOLUTION ORAL at 08:11

## 2019-11-29 RX ADMIN — GABAPENTIN 200 MG: 100 CAPSULE ORAL at 08:11

## 2019-11-29 RX ADMIN — SUCRALFATE 1 G: 1 SUSPENSION ORAL at 10:11

## 2019-11-29 RX ADMIN — AMIODARONE HYDROCHLORIDE 0.5 MG/MIN: 1.8 INJECTION, SOLUTION INTRAVENOUS at 12:11

## 2019-11-29 RX ADMIN — VENLAFAXINE 150 MG: 37.5 TABLET ORAL at 08:11

## 2019-11-29 RX ADMIN — EPINEPHRINE 0.01 MCG/KG/MIN: 1 INJECTION PARENTERAL at 07:11

## 2019-11-29 RX ADMIN — ASPIRIN 325 MG: 325 TABLET, DELAYED RELEASE ORAL at 08:11

## 2019-11-29 RX ADMIN — AMIODARONE HYDROCHLORIDE 0.5 MG/MIN: 1.8 INJECTION, SOLUTION INTRAVENOUS at 07:11

## 2019-11-29 RX ADMIN — SPIRONOLACTONE 12.5 MG: 25 TABLET, FILM COATED ORAL at 08:11

## 2019-11-29 RX ADMIN — MEXILETINE HYDROCHLORIDE 200 MG: 200 CAPSULE ORAL at 05:11

## 2019-11-29 NOTE — SUBJECTIVE & OBJECTIVE
Interval History: No events overnight. No more VT while laying on the bed. CVP trend 8 -10-7. C/o hear burn for which she received GI cocktail. Currenty on Epi at 0.01 , Simon at 20 . CO/CI- 7.3,3.3,    Continuous Infusions:   sodium chloride 0.9%      amiodarone in dextrose 5% 0.5 mg/min (11/29/19 0729)    epinephrine 0.01 mcg/kg/min (11/29/19 0758)    nitric oxide gas       Scheduled Meds:   aspirin  325 mg Oral Daily    furosemide  80 mg Oral BID    gabapentin  200 mg Oral BID    hepatitis A virus vaccine (PF)  1,440 Units Intramuscular Once    lisinopril  10 mg Oral BID    magnesium oxide  400 mg Oral Daily    mexiletine  200 mg Oral Q8H    mirtazapine  15 mg Oral QHS    pantoprazole  40 mg Oral Daily    polyethylene glycol  17 g Oral Daily    potassium chloride  40 mEq Oral BID    senna-docusate 8.6-50 mg  1 tablet Oral BID    spironolactone  12.5 mg Oral Daily    venlafaxine  150 mg Oral Daily    warfarin  4 mg Oral Daily     PRN Meds:sodium chloride 0.9%, ALPRAZolam, pneumoc 13-amber conj-dip cr(PF), promethazine, senna-docusate 8.6-50 mg, zolpidem    Review of patient's allergies indicates:   Allergen Reactions    Adhesive Blisters     Reaction to area in chest and up only    Codeine Itching     Objective:     Vital Signs (Most Recent):  Temp: 97.5 °F (36.4 °C) (11/29/19 0701)  Pulse: 86 (11/29/19 0734)  Resp: 19 (11/29/19 0734)  BP: (!) 96/0 (11/29/19 0701)  SpO2: 99 % (11/29/19 0701) Vital Signs (24h Range):  Temp:  [97.5 °F (36.4 °C)-98.9 °F (37.2 °C)] 97.5 °F (36.4 °C)  Pulse:  [72-99] 86  Resp:  [18-46] 19  SpO2:  [93 %-99 %] 99 %  BP: (65-98)/(0) 96/0     Patient Vitals for the past 72 hrs (Last 3 readings):   Weight   11/29/19 0500 105.7 kg (233 lb 0.4 oz)   11/27/19 1300 102.1 kg (225 lb)   11/27/19 0533 102.3 kg (225 lb 8.5 oz)     Body mass index is 36.5 kg/m².      Intake/Output Summary (Last 24 hours) at 11/29/2019 0833  Last data filed at 11/29/2019 0701  Gross per 24  hour   Intake 751.23 ml   Output 1600 ml   Net -848.77 ml       Hemodynamic Parameters:       Telemetry: NSR    Physical Exam  Constitutional: She is oriented to person, place, and time. She appears well-developed and well-nourished.   HENT:   Mouth/Throat: Oropharynx is clear and moist.   Eyes: EOM are normal.   Neck: No JVD present.   Cardiovascular: Normal rate, regular rhythm and intact distal pulses.   Smooth VAD hum   Pulmonary/Chest: Effort normal and breath sounds normal. No respiratory distress. She has no rales.   Abdominal: Soft. Bowel sounds are normal. She exhibits no distension. There is no tenderness. There is no guarding.   Musculoskeletal: She exhibits no edema.   Neurological: She is alert and oriented to person, place, and time.   Skin: Skin is warm.   Psychiatric: She has a normal mood and affect.   Significant Labs:  CBC:  Recent Labs   Lab 11/27/19  0319 11/28/19  0514 11/29/19  0501   WBC 5.18 4.94 5.00   RBC 4.53 4.28 4.53   HGB 11.0* 10.6* 11.2*   HCT 38.5 36.7* 38.7    307 321   MCV 85 86 85   MCH 24.3* 24.8* 24.7*   MCHC 28.6* 28.9* 28.9*     BNP:  Recent Labs   Lab 11/25/19  0357 11/27/19  0319 11/29/19  0501   * 217* 161*     CMP:  Recent Labs   Lab 11/25/19  0357  11/27/19  0319 11/28/19  0514 11/29/19  0501   *   < > 149* 132* 127*   CALCIUM 9.7   < > 9.6 9.3 9.6   ALBUMIN 3.5  --  3.4*  --  3.3*   PROT 7.0  --  6.9  --  7.0      < > 140 138 141   K 4.0   < > 4.2 3.9 4.6   CO2 29   < > 29 31* 25      < > 101 99 104   BUN 31*   < > 29* 28* 28*   CREATININE 1.5*   < > 1.7* 1.6* 1.7*   ALKPHOS 90  --  94  --  96   ALT 15  --  16  --  16   AST 19  --  21  --  23   BILITOT 0.5  --  0.3  --  0.2    < > = values in this interval not displayed.      Coagulation:   Recent Labs   Lab 11/27/19 0319 11/28/19  0514 11/29/19  0501   INR 1.9* 2.1* 2.4*   APTT 33.0* 35.4* 36.5*     LDH:  Recent Labs   Lab 11/27/19 0319 11/28/19  0514 11/29/19  0501    268* 252      Microbiology:  Microbiology Results (last 7 days)     ** No results found for the last 168 hours. **          I have reviewed all pertinent labs within the past 24 hours.    Estimated Creatinine Clearance: 49.5 mL/min (A) (based on SCr of 1.7 mg/dL (H)).    Diagnostic Results:  I have reviewed and interpreted all pertinent imaging results/findings within the past 24 hours.

## 2019-11-29 NOTE — NURSING
Notified \[facility]\"" of pt's new onset lightheadedness while at rest. Pt reports symptom has improved from severe to moderate since onset about 15 minutes ago but is still present and worsens with changes in position. Pt also reports her heartburn is still present. On a scale of 1 to 10 she rates her heart burn at as a 6. No new rhythm changes noted. SVO2 and CVP closely monitored since Epi discontinued. No VAD alarms noted. No new orders from \[facility]\"" at this time.

## 2019-11-29 NOTE — PROGRESS NOTES
11/28/2019  Trevin Sow    Current provider:  Vi Bryant MD      I, Trevin Sow, rounded on Deborah Navas to ensure all mechanical assist device settings (IABP or VAD) were appropriate and all parameters were within limits.  I was able to ensure all back up equipment was present, the staff had no issues, and the Perfusion Department daily rounding was complete.    9:01 PM

## 2019-11-29 NOTE — NURSING
CMICU DAILY GOALS       A: Awake    RASS: Goal - RASS Goal: 0-->alert and calm  Actual - RASS (Patel Agitation-Sedation Scale): 0-->alert and calm   Restraint necessity:    B: Breath   SBT: NA   C: Coordinate A & B, analgesics/sedatives   Pain: managed    SAT: NA  D: Delirium   CAM-ICU: Overall CAM-ICU: Negative  E: Early Mobility   MOVE Screen: Fail   Activity: Activity Management: activity adjusted per tolerance, bedrest maintained per order  FAS: Feeding/Nutrition   Diet order: Diet/Nutrition Received: no added salt, restrict fluids,   Fluid restriction: Fluid Requirement: 1500 mL  T: Thrombus   DVT prophylaxis: VTE Required Core Measure: Pharmacological prophylaxis initiated/maintained  H: HOB Elevation   Head of Bed (HOB): HOB at 30-45 degrees  U: Ulcer Prophylaxis   GI: no  G: Glucose control   managed    S: Skin   Bundle compliance: yes   Bathing/Skin Care: bath, chlorhexidine, dressed/undressed, linen changed Date: 11/28/19 AM Shift.   B: Bowel Function   no issues   I: Indwelling Catheters   Fonseca necessity:     CVC necessity: Yes   IPAD offered: Yes  D: De-escalation Antibx   N/A  Plan for the day   Wean Epi and monitor CVP. Bedrest for now to prevent further arrhythmias.  Family/Goals of care/Code Status   Code Status: Full Code     No acute events throughout day, VS and assessment per flow sheet, patient progressing towards goals as tolerated, plan of care reviewed with Deborah Navas and family, all concerns addressed, will continue to monitor.

## 2019-11-29 NOTE — ASSESSMENT & PLAN NOTE
-HeartMate 3 Implanted 9/10/19 as DT.  -Implanted by Dr. Walden  -INR sub therapeutic. Goal INR 2.0-3.0.Boost Coumadin.   - Previous home dose was 3mg on Wed and 1.5mg QDaily  -Antiplatelets:  mg.  -LDH is stable Will monitor daily.   -Speed set at 5200. Decreased 11/25 in setting of Vt with ICD shocks associated with valsalva and small LV.  Decreased to 5100 yesterday after reviewing Echo.   - On Epi 0.01,Simon 15, Oral lasix 80 mg BID.   -Not listed for OHTx: was declined due to pulmonary hypertension and incomplete care giving plan. Patient now has care giving plan. 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 Urgent selection 11/26, approved for transplant. Awaiting listing (insurance approval).    Procedure: Device Interrogation Including analysis of device parameters  Current Settings: Ventricular Assist Device  Review of device function is stable    TXP LVAD INTERROGATIONS 11/29/2019 11/29/2019 11/29/2019 11/29/2019 11/29/2019 11/29/2019 11/29/2019   Type HeartMate3 HeartMate3 HeartMate3 HeartMate3 HeartMate3 HeartMate3 HeartMate3   Flow 3.3 3.5 3.5 3.4 4.4 4.5 4.0   Speed 5100 510 5100 5100 5100 5100 5100   PI 6.6 6.4 5.7 6.0 3.2 3.3 4.1   Power (Orellana) 3.5 3.5 3.5 3.5 3.5 3.5 3.5   LSL 4700 4700 4700 4700 4700 4700 4700   Pulsatility Intermittent pulse Intermittent pulse Intermittent pulse Intermittent pulse Intermittent pulse Intermittent pulse Intermittent pulse

## 2019-11-29 NOTE — PLAN OF CARE
CMICU DAILY GOALS   CVP 10, 10, 8  SVO2 65    A: Awake    RASS: Goal - RASS Goal: 0-->alert and calm  Actual - RASS (Patel Agitation-Sedation Scale): 0-->alert and calm   Restraint necessity:    B: Breath   SBT: Not intubated   C: Coordinate A & B, analgesics/sedatives   Pain: managed    SAT: Not intubated  D: Delirium   CAM-ICU: Overall CAM-ICU: Negative  E: Early Mobility   MOVE Screen: Pass   Activity: Activity Management: activity adjusted per tolerance, activity clustered for rest period  FAS: Feeding/Nutrition   Diet order: Diet/Nutrition Received: 2 gram sodium,   Fluid restriction: Fluid Requirement: 1500 mL  T: Thrombus   DVT prophylaxis: VTE Required Core Measure: Pharmacological prophylaxis initiated/maintained  H: HOB Elevation   Head of Bed (HOB): HOB at 20 degrees  U: Ulcer Prophylaxis   GI: yes  G: Glucose control  NA  S: Skin   Bundle compliance: yes   Bathing/Skin Care: bath, partial Date: 11/29/19  B: Bowel Function   no issues   I: Indwelling Catheters   Fonseca necessity:     CVC necessity: Yes   IPAD offered: Yes  D: De-escalation Antibx   Yes  Plan for the day   Wean EPI monitor CVP  Family/Goals of care/Code Status   Code Status: Full Code     No acute events throughout day, VS and assessment per flow sheet, patient progressing towards goals as tolerated, plan of care reviewed with Deborah Navas and family, all concerns addressed, will continue to monitor.

## 2019-11-29 NOTE — PROGRESS NOTES
11/29/2019  Terra Porter    Current provider:  Vi Bryant MD      I, Terra Porter, rounded on Deborah Navas to ensure all mechanical assist device settings (IABP or VAD) were appropriate and all parameters were within limits.  I was able to ensure all back up equipment was present, the staff had no issues, and the Perfusion Department daily rounding was complete.    10:37 AM

## 2019-11-29 NOTE — NURSING
Notified HTS of pt's continued heartburn with no relief after GI cocktail. Pt describes symptom as sternal, severe with radiation into belly. No new orders at this time, team to discuss plan. No cardiac ectopy or rhythm changes noted. Pt denies palpitations or other symptoms.

## 2019-11-29 NOTE — PHYSICIAN QUERY
PT Name: Deborah Navas  MR #: [id number]    Physician Query Form - Perfusion Diagnosis Clarification     CDS/: Zoltan Jean-Baptiste Jr, RN               Contact information:[email or web address]  This form is a permanent document in the medical record.     Query Date: November 29, 2019    By submitting this query, we are merely seeking further clarification of documentation. Please utilize your independent clinical judgment when addressing the question(s) below.    The medical record contains the following:    Indicators   Supporting Clinical Findings   Location in Medical Record   x Acute Illness (e.g. AMI, Sepsis, etc.)  Ventricular tachycardia    Acute on chronic combined systolic and diastolic heart failure   11/14 H&P    11/14 H&P    Acidosis documented      ABGs / Labs     x Vital Signs BP=88/0--->64/0--->74/0--->78/0 11/13-11/4 VS Flowsheet     x Hypotension or Low Blood Pressure documented lasix on hold due to hypotension and LFAs. RHC today and will adjust PRN   11/18 Heart Transplant Progress Note    Altered Mental Status or Confusion      Diaphoresis, Cold Extremities or Cyanosis      Oliguria     x Medication/Treatment:  -Vasopressors  -Inotropic Drugs  -IV Fluids  -Cardiac Assist Devices  -Hemodynamic Monitoring  -Blood/Blood Products CVP 10, SVO2: 54. Calculated CO 4.68, CI: 2.14, SVR 1264  Plan to start Simon    now on EPI .02 and NO @ 10 with svo2 improving from 50-> 70's    Approved for OHT at urgent selection 11/26- remains in ICU for both ongoing RVF and VT post VAD   11/20 Heart Transplant Plan of Care Note      11/21 Heart Transplant Progress Note      11/27 Heart Transplant Progress Note   x Other: Left ventricular assist device (LVAD) complication  -hx low flow alarms prior to admit    Patient Asymptomatic, appears euvolemic, extremities WWP x 4 on exam 11/14 H&P        11/15 Heart Transplant Care Update Note     Provider, please specify diagnosis or diagnoses associated with above  clinical findings.    [   ] Cardiogenic Shock   [   ] Shock Unspecified   [ x  ] Other Condition (please specify):acute RV failure   [  ] Clinically Undetermined         Please document in your progress notes daily for the duration of treatment until resolved and include in your discharge summary.

## 2019-11-30 LAB
ALLENS TEST: ABNORMAL
ALLENS TEST: ABNORMAL
ANION GAP SERPL CALC-SCNC: 9 MMOL/L (ref 8–16)
APTT BLDCRRT: 35.7 SEC (ref 21–32)
BASOPHILS # BLD AUTO: 0.07 K/UL (ref 0–0.2)
BASOPHILS NFR BLD: 1.4 % (ref 0–1.9)
BUN SERPL-MCNC: 29 MG/DL (ref 6–20)
CALCIUM SERPL-MCNC: 9.6 MG/DL (ref 8.7–10.5)
CHLORIDE SERPL-SCNC: 101 MMOL/L (ref 95–110)
CO2 SERPL-SCNC: 28 MMOL/L (ref 23–29)
CREAT SERPL-MCNC: 1.7 MG/DL (ref 0.5–1.4)
DELSYS: ABNORMAL
DIFFERENTIAL METHOD: ABNORMAL
EOSINOPHIL # BLD AUTO: 0.2 K/UL (ref 0–0.5)
EOSINOPHIL NFR BLD: 3.4 % (ref 0–8)
ERYTHROCYTE [DISTWIDTH] IN BLOOD BY AUTOMATED COUNT: 16.4 % (ref 11.5–14.5)
ERYTHROCYTE [SEDIMENTATION RATE] IN BLOOD BY WESTERGREN METHOD: 16 MM/H
EST. GFR  (AFRICAN AMERICAN): 40 ML/MIN/1.73 M^2
EST. GFR  (NON AFRICAN AMERICAN): 34.7 ML/MIN/1.73 M^2
FIO2: 36
FLOW: 4
GLUCOSE SERPL-MCNC: 109 MG/DL (ref 70–110)
HCO3 UR-SCNC: 30.8 MMOL/L (ref 24–28)
HCO3 UR-SCNC: 33.7 MMOL/L (ref 24–28)
HCT VFR BLD AUTO: 38.4 % (ref 37–48.5)
HGB BLD-MCNC: 11 G/DL (ref 12–16)
IMM GRANULOCYTES # BLD AUTO: 0.01 K/UL (ref 0–0.04)
IMM GRANULOCYTES NFR BLD AUTO: 0.2 % (ref 0–0.5)
INR PPP: 2.9 (ref 0.8–1.2)
LDH SERPL L TO P-CCNC: 252 U/L (ref 110–260)
LYMPHOCYTES # BLD AUTO: 1 K/UL (ref 1–4.8)
LYMPHOCYTES NFR BLD: 20.5 % (ref 18–48)
MAGNESIUM SERPL-MCNC: 2.2 MG/DL (ref 1.6–2.6)
MCH RBC QN AUTO: 24.3 PG (ref 27–31)
MCHC RBC AUTO-ENTMCNC: 28.6 G/DL (ref 32–36)
MCV RBC AUTO: 85 FL (ref 82–98)
METHEMOGLOBIN: 0.3 % (ref 0–3)
MODE: ABNORMAL
MONOCYTES # BLD AUTO: 0.4 K/UL (ref 0.3–1)
MONOCYTES NFR BLD: 7.3 % (ref 4–15)
NEUTROPHILS # BLD AUTO: 3.4 K/UL (ref 1.8–7.7)
NEUTROPHILS NFR BLD: 67.2 % (ref 38–73)
NRBC BLD-RTO: 0 /100 WBC
PCO2 BLDA: 49.9 MMHG (ref 35–45)
PCO2 BLDA: 57.4 MMHG (ref 35–45)
PH SMN: 7.38 [PH] (ref 7.35–7.45)
PH SMN: 7.4 [PH] (ref 7.35–7.45)
PHOSPHATE SERPL-MCNC: 4.5 MG/DL (ref 2.7–4.5)
PLATELET # BLD AUTO: 300 K/UL (ref 150–350)
PMV BLD AUTO: 9.7 FL (ref 9.2–12.9)
PO2 BLDA: 28 MMHG (ref 40–60)
PO2 BLDA: 35 MMHG (ref 40–60)
POC BE: 6 MMOL/L
POC BE: 9 MMOL/L
POC SATURATED O2: 51 % (ref 95–100)
POC SATURATED O2: 65 % (ref 95–100)
POC TCO2: 32 MMOL/L (ref 24–29)
POC TCO2: 35 MMOL/L (ref 24–29)
POTASSIUM SERPL-SCNC: 4.3 MMOL/L (ref 3.5–5.1)
PROTHROMBIN TIME: 28 SEC (ref 9–12.5)
RBC # BLD AUTO: 4.52 M/UL (ref 4–5.4)
SAMPLE: ABNORMAL
SAMPLE: ABNORMAL
SITE: ABNORMAL
SITE: ABNORMAL
SODIUM SERPL-SCNC: 138 MMOL/L (ref 136–145)
WBC # BLD AUTO: 5.07 K/UL (ref 3.9–12.7)

## 2019-11-30 PROCEDURE — 83735 ASSAY OF MAGNESIUM: CPT | Mod: NTX

## 2019-11-30 PROCEDURE — 25000003 PHARM REV CODE 250: Mod: NTX | Performed by: PHYSICIAN ASSISTANT

## 2019-11-30 PROCEDURE — 25000003 PHARM REV CODE 250: Mod: NTX | Performed by: HOSPITALIST

## 2019-11-30 PROCEDURE — 20000000 HC ICU ROOM: Mod: NTX

## 2019-11-30 PROCEDURE — 27000221 HC OXYGEN, UP TO 24 HOURS: Mod: NTX

## 2019-11-30 PROCEDURE — 84100 ASSAY OF PHOSPHORUS: CPT | Mod: NTX

## 2019-11-30 PROCEDURE — 93750 PR INTERROGATE VENT ASSIST DEV, IN PERSON, W PHYSICIAN ANALYSIS: ICD-10-PCS | Mod: NTX,,, | Performed by: INTERNAL MEDICINE

## 2019-11-30 PROCEDURE — 93750 INTERROGATION VAD IN PERSON: CPT | Mod: NTX,,, | Performed by: INTERNAL MEDICINE

## 2019-11-30 PROCEDURE — 80048 BASIC METABOLIC PNL TOTAL CA: CPT | Mod: NTX

## 2019-11-30 PROCEDURE — 83050 HGB METHEMOGLOBIN QUAN: CPT | Mod: NTX

## 2019-11-30 PROCEDURE — 63600367 HC NITRIC OXIDE PER HOUR: Mod: NTX

## 2019-11-30 PROCEDURE — 82803 BLOOD GASES ANY COMBINATION: CPT | Mod: NTX

## 2019-11-30 PROCEDURE — 85730 THROMBOPLASTIN TIME PARTIAL: CPT | Mod: NTX

## 2019-11-30 PROCEDURE — 99900035 HC TECH TIME PER 15 MIN (STAT): Mod: NTX

## 2019-11-30 PROCEDURE — 85025 COMPLETE CBC W/AUTO DIFF WBC: CPT | Mod: NTX

## 2019-11-30 PROCEDURE — 99233 SBSQ HOSP IP/OBS HIGH 50: CPT | Mod: 25,NTX,, | Performed by: INTERNAL MEDICINE

## 2019-11-30 PROCEDURE — 27000248 HC VAD-ADDITIONAL DAY: Mod: NTX

## 2019-11-30 PROCEDURE — 94761 N-INVAS EAR/PLS OXIMETRY MLT: CPT | Mod: NTX

## 2019-11-30 PROCEDURE — 85610 PROTHROMBIN TIME: CPT | Mod: NTX

## 2019-11-30 PROCEDURE — 25000003 PHARM REV CODE 250: Mod: NTX | Performed by: STUDENT IN AN ORGANIZED HEALTH CARE EDUCATION/TRAINING PROGRAM

## 2019-11-30 PROCEDURE — 99233 PR SUBSEQUENT HOSPITAL CARE,LEVL III: ICD-10-PCS | Mod: 25,NTX,, | Performed by: INTERNAL MEDICINE

## 2019-11-30 PROCEDURE — 83615 LACTATE (LD) (LDH) ENZYME: CPT | Mod: NTX

## 2019-11-30 RX ADMIN — SENNOSIDES AND DOCUSATE SODIUM 1 TABLET: 8.6; 5 TABLET ORAL at 09:11

## 2019-11-30 RX ADMIN — PROMETHAZINE HYDROCHLORIDE 12.5 MG: 12.5 TABLET ORAL at 12:11

## 2019-11-30 RX ADMIN — GABAPENTIN 200 MG: 100 CAPSULE ORAL at 09:11

## 2019-11-30 RX ADMIN — FUROSEMIDE 80 MG: 40 TABLET ORAL at 09:11

## 2019-11-30 RX ADMIN — POTASSIUM CHLORIDE 40 MEQ: 1500 TABLET, EXTENDED RELEASE ORAL at 09:11

## 2019-11-30 RX ADMIN — ASPIRIN 325 MG: 325 TABLET, DELAYED RELEASE ORAL at 09:11

## 2019-11-30 RX ADMIN — LISINOPRIL 10 MG: 10 TABLET ORAL at 09:11

## 2019-11-30 RX ADMIN — FUROSEMIDE 80 MG: 40 TABLET ORAL at 05:11

## 2019-11-30 RX ADMIN — MIRTAZAPINE 15 MG: 15 TABLET, FILM COATED ORAL at 09:11

## 2019-11-30 RX ADMIN — ZOLPIDEM TARTRATE 5 MG: 5 TABLET ORAL at 11:11

## 2019-11-30 RX ADMIN — MAGNESIUM OXIDE TAB 400 MG (241.3 MG ELEMENTAL MG) 400 MG: 400 (241.3 MG) TAB at 09:11

## 2019-11-30 RX ADMIN — AMIODARONE HYDROCHLORIDE 400 MG: 200 TABLET ORAL at 09:11

## 2019-11-30 RX ADMIN — SPIRONOLACTONE 12.5 MG: 25 TABLET, FILM COATED ORAL at 09:11

## 2019-11-30 RX ADMIN — PANTOPRAZOLE SODIUM 40 MG: 40 TABLET, DELAYED RELEASE ORAL at 09:11

## 2019-11-30 RX ADMIN — WARFARIN SODIUM 1.5 MG: 1 TABLET ORAL at 05:11

## 2019-11-30 RX ADMIN — VENLAFAXINE 150 MG: 37.5 TABLET ORAL at 09:11

## 2019-11-30 RX ADMIN — MEXILETINE HYDROCHLORIDE 200 MG: 200 CAPSULE ORAL at 01:11

## 2019-11-30 RX ADMIN — MEXILETINE HYDROCHLORIDE 200 MG: 200 CAPSULE ORAL at 09:11

## 2019-11-30 RX ADMIN — POLYETHYLENE GLYCOL 3350 17 G: 17 POWDER, FOR SOLUTION ORAL at 09:11

## 2019-11-30 RX ADMIN — MEXILETINE HYDROCHLORIDE 200 MG: 200 CAPSULE ORAL at 06:11

## 2019-11-30 NOTE — PLAN OF CARE
CMICU DAILY GOALS       A: Awake    RASS: Goal - RASS Goal: 0-->alert and calm  Actual - RASS (Patel Agitation-Sedation Scale): 0-->alert and calm   Restraint necessity:    B: Breath   SBT: Not intubated   C: Coordinate A & B, analgesics/sedatives   Pain: managed    SAT: Not intubated  D: Delirium   CAM-ICU: Overall CAM-ICU: Negative  E: Early Mobility   MOVE Screen: Pass   Activity: Activity Management: activity adjusted per tolerance, activity clustered for rest period  FAS: Feeding/Nutrition   Diet order: Diet/Nutrition Received: 2 gram sodium,   Fluid restriction: Fluid Requirement: 1500 mL  T: Thrombus   DVT prophylaxis: VTE Required Core Measure: Pharmacological prophylaxis initiated/maintained  H: HOB Elevation   Head of Bed (HOB): HOB at 15 degrees  U: Ulcer Prophylaxis   GI: yes  G: Glucose control   managed    S: Skin   Bundle compliance: yes   Bathing/Skin Care: bath, partial Date: day bath  B: Bowel Function   no issues   I: Indwelling Catheters   Fonseca necessity:     CVC necessity: Yes   IPAD offered: Yes  D: De-escalation Antibx   Yes  Plan for the day   Wean nitric, monitor SVO2.  Family/Goals of care/Code Status   Code Status: Full Code     No acute events throughout day, VS and assessment per flow sheet, patient progressing towards goals as tolerated, plan of care reviewed with Deborah Navas and family, all concerns addressed, will continue to monitor.

## 2019-11-30 NOTE — ASSESSMENT & PLAN NOTE
-HeartMate 3 Implanted 9/10/19 as DT.  -Implanted by Dr. Walden  -INR sub therapeutic. Goal INR 2.0-3.0.Boost Coumadin.   - Previous home dose was 3mg on Wed and 1.5mg QDaily  -Antiplatelets:  mg.  -LDH is stable Will monitor daily.   -Speed set at 5200. Decreased 11/25 in setting of Vt with ICD shocks associated with valsalva and small LV.  Decreased to 5100 yesterday after reviewing Echo.   - On Epi 0.01,Simon 15, Oral lasix 80 mg BID.   -Not listed for OHTx: was declined due to pulmonary hypertension and incomplete care giving plan. Patient now has care giving plan. 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 Urgent selection 11/26, approved for transplant. Awaiting listing (insurance approval).    Procedure: Device Interrogation Including analysis of device parameters  Current Settings: Ventricular Assist Device  Review of device function is stable    TXP LVAD INTERROGATIONS 11/30/2019 11/30/2019 11/30/2019 11/30/2019 11/30/2019 11/30/2019 11/30/2019   Type HeartMate3 HeartMate3 HeartMate3 HeartMate3 HeartMate3 HeartMate3 HeartMate3   Flow 4.3 2.9 3.6 4.3 4.1 3.8 3.9   Speed 5100 5100 5100 5100 5100 5100 5100   PI 2.6 3.0 4.1 2.8 3.3 4.3 4.2   Power (Orellana) 3.5 3.2 3.5 3.6 3.5 3.5 3.6   LSL 4700 4700 4700 4700 4700 4700 4700   Pulsatility Intermittent pulse Intermittent pulse Intermittent pulse Intermittent pulse Intermittent pulse Intermittent pulse Intermittent pulse

## 2019-11-30 NOTE — PLAN OF CARE
CMICU DAILY GOALS       A: Awake    RASS: Goal - RASS Goal: 0-->alert and calm  Actual - RASS (Patel Agitation-Sedation Scale): 0-->alert and calm   Restraint necessity:    B: Breath   SBT: Not intubated   C: Coordinate A & B, analgesics/sedatives   Pain: managed    SAT: Not intubated  D: Delirium   CAM-ICU: Overall CAM-ICU: Negative  E: Early Mobility   MOVE Screen: Pass   Activity: Activity Management: activity adjusted per tolerance  FAS: Feeding/Nutrition   Diet order: Diet/Nutrition Received: 2 gram sodium,   Fluid restriction: Fluid Requirement: 1500 mL  T: Thrombus   DVT prophylaxis: VTE Required Core Measure: Pharmacological prophylaxis initiated/maintained  H: HOB Elevation   Head of Bed (HOB): HOB at 30-45 degrees  U: Ulcer Prophylaxis   GI: yes  G: Glucose control   managed    S: Skin   Bundle compliance: yes   Bathing/Skin Care: bath, partial Date: <No height available>-1  B: Bowel Function   no issues   I: Indwelling Catheters   Fonseca necessity:     CVC necessity: Yes   IPAD offered: Not appropriate  D: De-escalation Antibx   No  Plan for the day   CVP 6 - 7, Doppler 68 - 78, nitric 10  Family/Goals of care/Code Status   Code Status: Full Code     No acute events throughout day, VS and assessment per flow sheet, patient progressing towards goals as tolerated, plan of care reviewed with Deborah Navas and family, all concerns addressed, will continue to monitor.

## 2019-11-30 NOTE — PROGRESS NOTES
[facility]  Heart Transplant  Progress Note    Patient Name: Deborah Navas  MRN: [id number]  Admission Date: 11/13/2019  Hospital Length of Stay: 15 days  Attending Physician: Vi Bryant MD  Primary Care Provider: Primary Doctor No  Principal Problem:Left ventricular assist device (LVAD) complication    Subjective:     Interval History: No events overnight. CVP 8 this morning    Continuous Infusions:   sodium chloride 0.9%      nitric oxide gas       Scheduled Meds:   amiodarone  400 mg Oral BID    aspirin  325 mg Oral Daily    furosemide  80 mg Oral BID    gabapentin  200 mg Oral BID    hepatitis A virus vaccine (PF)  1,440 Units Intramuscular Once    lisinopril  10 mg Oral BID    magnesium oxide  400 mg Oral Daily    mexiletine  200 mg Oral Q8H    mirtazapine  15 mg Oral QHS    pantoprazole  40 mg Oral BID    polyethylene glycol  17 g Oral Daily    potassium chloride  40 mEq Oral BID    senna-docusate 8.6-50 mg  1 tablet Oral BID    spironolactone  12.5 mg Oral Daily    venlafaxine  150 mg Oral Daily    warfarin  1.5 mg Oral Daily     PRN Meds:sodium chloride 0.9%, ALPRAZolam, pneumoc 13-amber conj-dip cr(PF), promethazine, senna-docusate 8.6-50 mg, sucralfate, zolpidem    Review of patient's allergies indicates:   Allergen Reactions    Adhesive Blisters     Reaction to area in chest and up only    Codeine Itching     Objective:     Vital Signs (Most Recent):  Temp: 96.8 °F (36 °C) (11/30/19 1100)  Pulse: (!) 139 (11/30/19 1400)  Resp: (!) 32 (11/30/19 1400)  BP: (!) 68/0 (11/30/19 1100)  SpO2: 95 % (11/30/19 1149) Vital Signs (24h Range):  Temp:  [96.8 °F (36 °C)-98.3 °F (36.8 °C)] 96.8 °F (36 °C)  Pulse:  [] 139  Resp:  [16-46] 32  SpO2:  [92 %-99 %] 95 %  BP: ()/(0) 68/0     Patient Vitals for the past 72 hrs (Last 3 readings):   Weight   11/30/19 0500 105.6 kg (232 lb 12.9 oz)   11/29/19 0500 105.7 kg (233 lb 0.4 oz)     Body mass index is 36.46  kg/m².      Intake/Output Summary (Last 24 hours) at 11/30/2019 1444  Last data filed at 11/30/2019 0754  Gross per 24 hour   Intake 1490 ml   Output 1350 ml   Net 140 ml       Hemodynamic Parameters:       Telemetry: NSR    Physical Exam  Constitutional: She is oriented to person, place, and time. She appears well-developed and well-nourished.   HENT:   Mouth/Throat: Oropharynx is clear and moist.   Eyes: EOM are normal.   Neck: No JVD present.   Cardiovascular: Normal rate, regular rhythm and intact distal pulses.   Smooth VAD hum   Pulmonary/Chest: Effort normal and breath sounds normal. No respiratory distress. She has no rales.   Abdominal: Soft. Bowel sounds are normal. She exhibits no distension. There is no tenderness. There is no guarding.   Musculoskeletal: She exhibits no edema.   Neurological: She is alert and oriented to person, place, and time.   Skin: Skin is warm.      Significant Labs:  CBC:  Recent Labs   Lab 11/28/19 0514 11/29/19  0501 11/30/19  0434   WBC 4.94 5.00 5.07   RBC 4.28 4.53 4.52   HGB 10.6* 11.2* 11.0*   HCT 36.7* 38.7 38.4    321 300   MCV 86 85 85   MCH 24.8* 24.7* 24.3*   MCHC 28.9* 28.9* 28.6*     BNP:  Recent Labs   Lab 11/25/19 0357 11/27/19  0319 11/29/19  0501   * 217* 161*     CMP:  Recent Labs   Lab 11/25/19 0357 11/27/19 0319 11/28/19  0514 11/29/19  0501 11/30/19  0434   *   < > 149* 132* 127* 109   CALCIUM 9.7   < > 9.6 9.3 9.6 9.6   ALBUMIN 3.5  --  3.4*  --  3.3*  --    PROT 7.0  --  6.9  --  7.0  --       < > 140 138 141 138   K 4.0   < > 4.2 3.9 4.6 4.3   CO2 29   < > 29 31* 25 28      < > 101 99 104 101   BUN 31*   < > 29* 28* 28* 29*   CREATININE 1.5*   < > 1.7* 1.6* 1.7* 1.7*   ALKPHOS 90  --  94  --  96  --    ALT 15  --  16  --  16  --    AST 19  --  21  --  23  --    BILITOT 0.5  --  0.3  --  0.2  --     < > = values in this interval not displayed.      Coagulation:   Recent Labs   Lab 11/28/19  0514 11/29/19  1903  11/30/19  0434   INR 2.1* 2.4* 2.9*   APTT 35.4* 36.5* 35.7*     LDH:  Recent Labs   Lab 11/28/19  0514 11/29/19  0501 11/30/19  0434   * 252 252     Microbiology:  Microbiology Results (last 7 days)     ** No results found for the last 168 hours. **          I have reviewed all pertinent labs within the past 24 hours.    Estimated Creatinine Clearance: 49.5 mL/min (A) (based on SCr of 1.7 mg/dL (H)).    Diagnostic Results:  I have reviewed and interpreted all pertinent imaging results/findings within the past 24 hours.    Assessment and Plan:     50 yo WF with NICM, s/p HM3 implantation 9/10/19 (post up coarse uncomplicated with the exception of+ diaphragmatic stimulation of LV lead and pleural effusion with chest tube placement, atrial flutter with NADINE/DCCV), V-fib reported in setting of hypokalemia with appropriate shock from ICD on Amiodarone prior to LVAD placement; and recent hospitalizations for ventricular arrythmias; started on Mexilitine.  She was seen in EP clinic today and she continues to have multiple VT episodes with some ATP therapy, although no ICD shocks since starting mexiletine 10/24/2019. One slow VT episode 2.5hrs 11/3/2019. Patient asymptomatic with LVAD. On coumadin. No AFL since post-op event (paced out of rhythm 9/24/2019). CHB with 100% V pacing (LV lead off). She does not feel well. Dry heaving with mexiletine. Taking phenergan PRN. 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   She was seen in HTS clinic and reported 4 low flow alarms the last 4 nights.  She reports they occur in her sleep and last for only a few seconds.  By the time she wakes up; they quickly stop.  She does report to possibly being little volume overloaded.  She hasn't noticed weight gain at home but thinks she may have little extra fluid.  She denies SOB, chest pain, palpitations, LEGER. In review of her records: she was 223lbs on 10/24/19 during previous hospitalization and is  235lbs now.  Her lasix had previously been decreased to prn.     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    Essential hypertension  -Patient is non-pulsatile  -Doppler goal 60-90 mmHg  -Antihypertensive medications include  Lisinopril  -Hydralazine was started on evening of admit and then changed to low dose Norvasc and Spironolactone to aid with BP and low K+.  Hesitated on increasing Lisinopril because of active diuresis and concern for fluctuation of creatinine    - amlodipine now on hold in setting of symptomatic hypotension and LFAs on 11/16      Ventricular tachycardia  - Per EP office visit on day of admission: She continues to have multiple VT episodes with some ATP therapy, although no ICD shocks since starting mexiletine 10/24/2019. One slow VT episode 2.5hrs 11/3/2019. Patient asymptomatic with LVAD. On coumadin. No AFL since post-op event (paced out of rhythm 9/24/2019). CHB with 100% V pacing (LV lead off).   - Plan was to continue current regimen despite Mexiletine making her nauseous.  - Decreased Amiodarone to daily per EP plan on discharge last hospitalization   - Shocked x 3 11/25 for MMVT   - continues on amio ggt s/p amio load and increased mexiltine to 200 per EP. Still following, appreciate assistance     LVAD (left ventricular assist device) present  -HeartMate 3 Implanted 9/10/19 as DT.  -Implanted by Dr. Walden  -INR sub therapeutic. Goal INR 2.0-3.0.Boost Coumadin.   - Previous home dose was 3mg on Wed and  1.5mg QDaily    - INR 2.9. Will give 11.5 mg coumadin   -Antiplatelets:  mg.  -LDH is stable Will monitor daily.   -Speed set at 5200. Decreased 11/25 in setting of Vt with ICD shocks associated with valsalva and small LV.  Decreased to 5100 yesterday after reviewing Echo.   - On  Simon 15, Oral lasix 80 mg BID. Will decrease Simon to 10PPM     -Not listed for OHTx: was declined due to pulmonary hypertension and incomplete care giving plan. Patient now has care giving plan. 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 Urgent selection 11/26, approved for transplant. Awaiting listing (insurance approval).    Procedure: Device Interrogation Including analysis of device parameters  Current Settings: Ventricular Assist Device  Review of device function is stable    TXP LVAD INTERROGATIONS 11/30/2019 11/30/2019 11/30/2019 11/30/2019 11/30/2019 11/30/2019 11/30/2019   Type HeartMate3 HeartMate3 HeartMate3 HeartMate3 HeartMate3 HeartMate3 HeartMate3   Flow 4.3 2.9 3.6 4.3 4.1 3.8 3.9   Speed 5100 5100 5100 5100 5100 5100 5100   PI 2.6 3.0 4.1 2.8 3.3 4.3 4.2   Power (Orellana) 3.5 3.2 3.5 3.6 3.5 3.5 3.6   LSL 4700 4700 4700 4700 4700 4700 4700   Pulsatility Intermittent pulse Intermittent pulse Intermittent pulse Intermittent pulse Intermittent pulse Intermittent pulse Intermittent pulse       CKD (chronic kidney disease)  - Baseline creat appears 1.3-1.6  - Will monitor with diuresis     Acute on chronic combined systolic and diastolic heart failure  -NICM  -Last 2D Echo 11/25/19. Severe TR, IVC 8, normal RV function?, LV/RV dimensions 4.5/3.9 @ 5300.    - Decrease Simon to 10 from 15. Cont to monitor CVP   -2g Na dietary restriction, 1500 mL fluid restriction, strict I/Os      ICD (implantable cardioverter-defibrillator) in place  - Med[employer]onic ICD  - Shocked x 3 11/25 for MMVT   - continues on amio ggt s/p amio load and increased mexiltine to 200 per EP. Still following, appreciate assistance   - See above  VT        Dandre Hunter MD  Heart Transplant  [facility]

## 2019-11-30 NOTE — SUBJECTIVE & OBJECTIVE
Interval History: No events overnight. CVP 8 this morning    Continuous Infusions:   sodium chloride 0.9%      nitric oxide gas       Scheduled Meds:   amiodarone  400 mg Oral BID    aspirin  325 mg Oral Daily    furosemide  80 mg Oral BID    gabapentin  200 mg Oral BID    hepatitis A virus vaccine (PF)  1,440 Units Intramuscular Once    lisinopril  10 mg Oral BID    magnesium oxide  400 mg Oral Daily    mexiletine  200 mg Oral Q8H    mirtazapine  15 mg Oral QHS    pantoprazole  40 mg Oral BID    polyethylene glycol  17 g Oral Daily    potassium chloride  40 mEq Oral BID    senna-docusate 8.6-50 mg  1 tablet Oral BID    spironolactone  12.5 mg Oral Daily    venlafaxine  150 mg Oral Daily    warfarin  1.5 mg Oral Daily     PRN Meds:sodium chloride 0.9%, ALPRAZolam, pneumoc 13-amber conj-dip cr(PF), promethazine, senna-docusate 8.6-50 mg, sucralfate, zolpidem    Review of patient's allergies indicates:   Allergen Reactions    Adhesive Blisters     Reaction to area in chest and up only    Codeine Itching     Objective:     Vital Signs (Most Recent):  Temp: 96.8 °F (36 °C) (11/30/19 1100)  Pulse: (!) 139 (11/30/19 1400)  Resp: (!) 32 (11/30/19 1400)  BP: (!) 68/0 (11/30/19 1100)  SpO2: 95 % (11/30/19 1149) Vital Signs (24h Range):  Temp:  [96.8 °F (36 °C)-98.3 °F (36.8 °C)] 96.8 °F (36 °C)  Pulse:  [] 139  Resp:  [16-46] 32  SpO2:  [92 %-99 %] 95 %  BP: ()/(0) 68/0     Patient Vitals for the past 72 hrs (Last 3 readings):   Weight   11/30/19 0500 105.6 kg (232 lb 12.9 oz)   11/29/19 0500 105.7 kg (233 lb 0.4 oz)     Body mass index is 36.46 kg/m².      Intake/Output Summary (Last 24 hours) at 11/30/2019 1444  Last data filed at 11/30/2019 0754  Gross per 24 hour   Intake 1490 ml   Output 1350 ml   Net 140 ml       Hemodynamic Parameters:       Telemetry: NSR    Physical Exam  Constitutional: She is oriented to person, place, and time. She appears well-developed and well-nourished.   HENT:    Mouth/Throat: Oropharynx is clear and moist.   Eyes: EOM are normal.   Neck: No JVD present.   Cardiovascular: Normal rate, regular rhythm and intact distal pulses.   Smooth VAD hum   Pulmonary/Chest: Effort normal and breath sounds normal. No respiratory distress. She has no rales.   Abdominal: Soft. Bowel sounds are normal. She exhibits no distension. There is no tenderness. There is no guarding.   Musculoskeletal: She exhibits no edema.   Neurological: She is alert and oriented to person, place, and time.   Skin: Skin is warm.      Significant Labs:  CBC:  Recent Labs   Lab 11/28/19 0514 11/29/19  0501 11/30/19  0434   WBC 4.94 5.00 5.07   RBC 4.28 4.53 4.52   HGB 10.6* 11.2* 11.0*   HCT 36.7* 38.7 38.4    321 300   MCV 86 85 85   MCH 24.8* 24.7* 24.3*   MCHC 28.9* 28.9* 28.6*     BNP:  Recent Labs   Lab 11/25/19  0357 11/27/19 0319 11/29/19  0501   * 217* 161*     CMP:  Recent Labs   Lab 11/25/19 0357 11/27/19 0319 11/28/19 0514 11/29/19  0501 11/30/19  0434   *   < > 149* 132* 127* 109   CALCIUM 9.7   < > 9.6 9.3 9.6 9.6   ALBUMIN 3.5  --  3.4*  --  3.3*  --    PROT 7.0  --  6.9  --  7.0  --       < > 140 138 141 138   K 4.0   < > 4.2 3.9 4.6 4.3   CO2 29   < > 29 31* 25 28      < > 101 99 104 101   BUN 31*   < > 29* 28* 28* 29*   CREATININE 1.5*   < > 1.7* 1.6* 1.7* 1.7*   ALKPHOS 90  --  94  --  96  --    ALT 15  --  16  --  16  --    AST 19  --  21  --  23  --    BILITOT 0.5  --  0.3  --  0.2  --     < > = values in this interval not displayed.      Coagulation:   Recent Labs   Lab 11/28/19 0514 11/29/19  0501 11/30/19  0434   INR 2.1* 2.4* 2.9*   APTT 35.4* 36.5* 35.7*     LDH:  Recent Labs   Lab 11/28/19  0514 11/29/19  0501 11/30/19  0434   * 252 252     Microbiology:  Microbiology Results (last 7 days)     ** No results found for the last 168 hours. **          I have reviewed all pertinent labs within the past 24 hours.    Estimated Creatinine Clearance:  49.5 mL/min (A) (based on SCr of 1.7 mg/dL (H)).    Diagnostic Results:  I have reviewed and interpreted all pertinent imaging results/findings within the past 24 hours.

## 2019-11-30 NOTE — PLAN OF CARE
CMICU DAILY GOALS       A: Awake    RASS: Goal - RASS Goal: 0-->alert and calm  Actual - RASS (Patel Agitation-Sedation Scale): 0-->alert and calm   Restraint necessity:    B: Breath   SBT: Not intubated   C: Coordinate A & B, analgesics/sedatives   Pain: managed    SAT: Not intubated  D: Delirium   CAM-ICU: Overall CAM-ICU: Negative  E: Early Mobility   MOVE Screen: Pass   Activity: Activity Management: activity adjusted per tolerance, activity clustered for rest period  FAS: Feeding/Nutrition   Diet order: Diet/Nutrition Received: 2 gram sodium, restrict fluids,   Fluid restriction: Fluid Requirement: 1500 mL  T: Thrombus   DVT prophylaxis: VTE Required Core Measure: Pharmacological prophylaxis initiated/maintained  H: HOB Elevation   Head of Bed (HOB): HOB at 30-45 degrees  U: Ulcer Prophylaxis   GI: yes  G: Glucose control   managed    S: Skin   Bundle compliance: yes   Bathing/Skin Care: bath, partial Date: 11/29/19 AM Shift  B: Bowel Function   no issues   I: Indwelling Catheters   Fonseca necessity:     CVC necessity: Yes   IPAD offered: Yes  D: De-escalation Antibx   N/A  Plan for the day   Epi discontinued @ 1000. SVO2 trended down to 49 @ 1700. CVP @ 1700 was 7. Light-headedness and heartburn pt c/o for most of day has resolved. HTS wants to repeat SVO2 @ 2100 tonight. Plan for OOB activity and weaning of Nitric if patient remains stable overnight. Formal listing for transplant pending insurance/financial approval.   Family/Goals of care/Code Status   Code Status: Full Code     No acute events throughout day, VS and assessment per flow sheet, patient progressing towards goals as tolerated, plan of care reviewed with Deborah Navas and family, all concerns addressed, will continue to monitor.

## 2019-12-01 LAB
ALLENS TEST: ABNORMAL
ALLENS TEST: ABNORMAL
ANION GAP SERPL CALC-SCNC: 10 MMOL/L (ref 8–16)
APTT BLDCRRT: 37.4 SEC (ref 21–32)
BASOPHILS # BLD AUTO: 0.05 K/UL (ref 0–0.2)
BASOPHILS NFR BLD: 1 % (ref 0–1.9)
BUN SERPL-MCNC: 31 MG/DL (ref 6–20)
CALCIUM SERPL-MCNC: 9.6 MG/DL (ref 8.7–10.5)
CHLORIDE SERPL-SCNC: 101 MMOL/L (ref 95–110)
CO2 SERPL-SCNC: 29 MMOL/L (ref 23–29)
CREAT SERPL-MCNC: 2 MG/DL (ref 0.5–1.4)
DELSYS: ABNORMAL
DIFFERENTIAL METHOD: ABNORMAL
EOSINOPHIL # BLD AUTO: 0.2 K/UL (ref 0–0.5)
EOSINOPHIL NFR BLD: 4.5 % (ref 0–8)
ERYTHROCYTE [DISTWIDTH] IN BLOOD BY AUTOMATED COUNT: 16.2 % (ref 11.5–14.5)
EST. GFR  (AFRICAN AMERICAN): 32.8 ML/MIN/1.73 M^2
EST. GFR  (NON AFRICAN AMERICAN): 28.5 ML/MIN/1.73 M^2
FLOW: 5
GLUCOSE SERPL-MCNC: 129 MG/DL (ref 70–110)
HCO3 UR-SCNC: 31.6 MMOL/L (ref 24–28)
HCO3 UR-SCNC: 31.8 MMOL/L (ref 24–28)
HCT VFR BLD AUTO: 38 % (ref 37–48.5)
HGB BLD-MCNC: 10.6 G/DL (ref 12–16)
IMM GRANULOCYTES # BLD AUTO: 0.01 K/UL (ref 0–0.04)
IMM GRANULOCYTES NFR BLD AUTO: 0.2 % (ref 0–0.5)
INR PPP: 3.5 (ref 0.8–1.2)
LDH SERPL L TO P-CCNC: 218 U/L (ref 110–260)
LYMPHOCYTES # BLD AUTO: 1.2 K/UL (ref 1–4.8)
LYMPHOCYTES NFR BLD: 23.2 % (ref 18–48)
MAGNESIUM SERPL-MCNC: 2.3 MG/DL (ref 1.6–2.6)
MCH RBC QN AUTO: 23.7 PG (ref 27–31)
MCHC RBC AUTO-ENTMCNC: 27.9 G/DL (ref 32–36)
MCV RBC AUTO: 85 FL (ref 82–98)
MODE: ABNORMAL
MONOCYTES # BLD AUTO: 0.4 K/UL (ref 0.3–1)
MONOCYTES NFR BLD: 8.6 % (ref 4–15)
NEUTROPHILS # BLD AUTO: 3.2 K/UL (ref 1.8–7.7)
NEUTROPHILS NFR BLD: 62.5 % (ref 38–73)
NRBC BLD-RTO: 0 /100 WBC
PCO2 BLDA: 53.3 MMHG (ref 35–45)
PCO2 BLDA: 55.3 MMHG (ref 35–45)
PH SMN: 7.36 [PH] (ref 7.35–7.45)
PH SMN: 7.38 [PH] (ref 7.35–7.45)
PHOSPHATE SERPL-MCNC: 4.6 MG/DL (ref 2.7–4.5)
PLATELET # BLD AUTO: 309 K/UL (ref 150–350)
PMV BLD AUTO: 9.9 FL (ref 9.2–12.9)
PO2 BLDA: 25 MMHG (ref 40–60)
PO2 BLDA: 40 MMHG (ref 40–60)
POC BE: 6 MMOL/L
POC BE: 7 MMOL/L
POC SATURATED O2: 43 % (ref 95–100)
POC SATURATED O2: 71 % (ref 95–100)
POC TCO2: 33 MMOL/L (ref 24–29)
POC TCO2: 33 MMOL/L (ref 24–29)
POTASSIUM SERPL-SCNC: 4.1 MMOL/L (ref 3.5–5.1)
PROTHROMBIN TIME: 34.4 SEC (ref 9–12.5)
RBC # BLD AUTO: 4.47 M/UL (ref 4–5.4)
SAMPLE: ABNORMAL
SAMPLE: ABNORMAL
SITE: ABNORMAL
SITE: ABNORMAL
SODIUM SERPL-SCNC: 140 MMOL/L (ref 136–145)
SP02: 100
WBC # BLD AUTO: 5.12 K/UL (ref 3.9–12.7)

## 2019-12-01 PROCEDURE — 83735 ASSAY OF MAGNESIUM: CPT | Mod: NTX

## 2019-12-01 PROCEDURE — 93750 PR INTERROGATE VENT ASSIST DEV, IN PERSON, W PHYSICIAN ANALYSIS: ICD-10-PCS | Mod: NTX,,, | Performed by: INTERNAL MEDICINE

## 2019-12-01 PROCEDURE — 84100 ASSAY OF PHOSPHORUS: CPT | Mod: NTX

## 2019-12-01 PROCEDURE — 80048 BASIC METABOLIC PNL TOTAL CA: CPT | Mod: NTX

## 2019-12-01 PROCEDURE — 93750 INTERROGATION VAD IN PERSON: CPT | Mod: NTX,,, | Performed by: INTERNAL MEDICINE

## 2019-12-01 PROCEDURE — 85730 THROMBOPLASTIN TIME PARTIAL: CPT | Mod: NTX

## 2019-12-01 PROCEDURE — 99233 PR SUBSEQUENT HOSPITAL CARE,LEVL III: ICD-10-PCS | Mod: 25,NTX,, | Performed by: INTERNAL MEDICINE

## 2019-12-01 PROCEDURE — 63600367 HC NITRIC OXIDE PER HOUR: Mod: NTX

## 2019-12-01 PROCEDURE — 25000003 PHARM REV CODE 250: Mod: NTX | Performed by: PHYSICIAN ASSISTANT

## 2019-12-01 PROCEDURE — 94761 N-INVAS EAR/PLS OXIMETRY MLT: CPT | Mod: NTX

## 2019-12-01 PROCEDURE — 99233 SBSQ HOSP IP/OBS HIGH 50: CPT | Mod: 25,NTX,, | Performed by: INTERNAL MEDICINE

## 2019-12-01 PROCEDURE — 27000221 HC OXYGEN, UP TO 24 HOURS: Mod: NTX

## 2019-12-01 PROCEDURE — 27000248 HC VAD-ADDITIONAL DAY: Mod: NTX

## 2019-12-01 PROCEDURE — 82803 BLOOD GASES ANY COMBINATION: CPT | Mod: NTX

## 2019-12-01 PROCEDURE — 83615 LACTATE (LD) (LDH) ENZYME: CPT | Mod: NTX

## 2019-12-01 PROCEDURE — 85025 COMPLETE CBC W/AUTO DIFF WBC: CPT | Mod: NTX

## 2019-12-01 PROCEDURE — 99900035 HC TECH TIME PER 15 MIN (STAT): Mod: NTX

## 2019-12-01 PROCEDURE — 25000003 PHARM REV CODE 250: Mod: NTX | Performed by: HOSPITALIST

## 2019-12-01 PROCEDURE — 25000003 PHARM REV CODE 250: Mod: NTX | Performed by: STUDENT IN AN ORGANIZED HEALTH CARE EDUCATION/TRAINING PROGRAM

## 2019-12-01 PROCEDURE — 20000000 HC ICU ROOM: Mod: NTX

## 2019-12-01 PROCEDURE — 85610 PROTHROMBIN TIME: CPT | Mod: NTX

## 2019-12-01 RX ORDER — LISINOPRIL 10 MG/1
10 TABLET ORAL DAILY
Status: DISCONTINUED | OUTPATIENT
Start: 2019-12-02 | End: 2019-12-03

## 2019-12-01 RX ORDER — FUROSEMIDE 40 MG/1
80 TABLET ORAL DAILY
Status: DISCONTINUED | OUTPATIENT
Start: 2019-12-02 | End: 2019-12-03

## 2019-12-01 RX ADMIN — POTASSIUM CHLORIDE 40 MEQ: 1500 TABLET, EXTENDED RELEASE ORAL at 09:12

## 2019-12-01 RX ADMIN — MEXILETINE HYDROCHLORIDE 200 MG: 200 CAPSULE ORAL at 05:12

## 2019-12-01 RX ADMIN — SENNOSIDES AND DOCUSATE SODIUM 1 TABLET: 8.6; 5 TABLET ORAL at 09:12

## 2019-12-01 RX ADMIN — SENNOSIDES AND DOCUSATE SODIUM 1 TABLET: 8.6; 5 TABLET ORAL at 08:12

## 2019-12-01 RX ADMIN — MEXILETINE HYDROCHLORIDE 200 MG: 200 CAPSULE ORAL at 09:12

## 2019-12-01 RX ADMIN — ASPIRIN 325 MG: 325 TABLET, DELAYED RELEASE ORAL at 08:12

## 2019-12-01 RX ADMIN — LISINOPRIL 10 MG: 10 TABLET ORAL at 08:12

## 2019-12-01 RX ADMIN — SPIRONOLACTONE 12.5 MG: 25 TABLET, FILM COATED ORAL at 11:12

## 2019-12-01 RX ADMIN — ALPRAZOLAM 0.25 MG: 0.25 TABLET ORAL at 04:12

## 2019-12-01 RX ADMIN — GABAPENTIN 200 MG: 100 CAPSULE ORAL at 09:12

## 2019-12-01 RX ADMIN — GABAPENTIN 200 MG: 100 CAPSULE ORAL at 08:12

## 2019-12-01 RX ADMIN — FUROSEMIDE 80 MG: 40 TABLET ORAL at 08:12

## 2019-12-01 RX ADMIN — PANTOPRAZOLE SODIUM 40 MG: 40 TABLET, DELAYED RELEASE ORAL at 08:12

## 2019-12-01 RX ADMIN — PANTOPRAZOLE SODIUM 40 MG: 40 TABLET, DELAYED RELEASE ORAL at 09:12

## 2019-12-01 RX ADMIN — AMIODARONE HYDROCHLORIDE 400 MG: 200 TABLET ORAL at 08:12

## 2019-12-01 RX ADMIN — POLYETHYLENE GLYCOL 3350 17 G: 17 POWDER, FOR SOLUTION ORAL at 08:12

## 2019-12-01 RX ADMIN — MEXILETINE HYDROCHLORIDE 200 MG: 200 CAPSULE ORAL at 01:12

## 2019-12-01 RX ADMIN — AMIODARONE HYDROCHLORIDE 400 MG: 200 TABLET ORAL at 09:12

## 2019-12-01 RX ADMIN — VENLAFAXINE 150 MG: 37.5 TABLET ORAL at 08:12

## 2019-12-01 RX ADMIN — ZOLPIDEM TARTRATE 5 MG: 5 TABLET ORAL at 09:12

## 2019-12-01 RX ADMIN — MAGNESIUM OXIDE TAB 400 MG (241.3 MG ELEMENTAL MG) 400 MG: 400 (241.3 MG) TAB at 08:12

## 2019-12-01 RX ADMIN — MIRTAZAPINE 15 MG: 15 TABLET, FILM COATED ORAL at 09:12

## 2019-12-01 RX ADMIN — POTASSIUM CHLORIDE 40 MEQ: 1500 TABLET, EXTENDED RELEASE ORAL at 08:12

## 2019-12-01 NOTE — ASSESSMENT & PLAN NOTE
-HeartMate 3 Implanted 9/10/19 as DT.  -Implanted by Dr. Walden  -INR  3.5 today. Likely from Amio. Will dc coumadin for today   -Antiplatelets:  mg.  -LDH is stable Will monitor daily.   -Speed set at 5200. Decreased 11/25 in setting of Vt with ICD shocks associated with valsalva and small LV.  Decreased to 5100 yesterday after reviewing Echo.   - Epi weaned off . Decrease Simon to 5 ppm and if CVP states below 10 will stop her Simon completely   -Not listed for OHTx: was declined due to pulmonary hypertension and incomplete care giving plan. Patient now has care giving plan. 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 Urgent selection 11/26, approved for transplant. Awaiting listing (insurance approval).    Procedure: Device Interrogation Including analysis of device parameters  Current Settings: Ventricular Assist Device  Review of device function is stable    TXP LVAD INTERROGATIONS 12/1/2019 12/1/2019 12/1/2019 12/1/2019 12/1/2019 12/1/2019 12/1/2019   Type HeartMate3 HeartMate3 HeartMate3 HeartMate3 HeartMate3 HeartMate3 HeartMate3   Flow 3.6 3.6 3.7 3.8 3.8 4 4.1   Speed 5100 5100 5100 5100 5100 5100 5100   PI 4.8 4.8 4.4 4.3 4.7 3.2 3.5   Power (Orellana) 3.5 3.5 3.5 3.6 3.5 3.7 3.5   LSL 4700 4700 4700 4700 4700 4700 4700   Pulsatility No Pulse No Pulse No Pulse No Pulse No Pulse Intermittent pulse -

## 2019-12-01 NOTE — PROGRESS NOTES
12/01/2019  Trevin Sow    Current provider:  Vi Bryant MD      I, Trevin Sow, rounded on Deborah Navas to ensure all mechanical assist device settings (IABP or VAD) were appropriate and all parameters were within limits.  I was able to ensure all back up equipment was present, the staff had no issues, and the Perfusion Department daily rounding was complete.    3:40 AM

## 2019-12-01 NOTE — PROGRESS NOTES
[facility]  Heart Transplant  Progress Note    Patient Name: Deborah Navas  MRN: [id number]  Admission Date: 11/13/2019  Hospital Length of Stay: 16 days  Attending Physician: Vi Bryant MD  Primary Care Provider: Primary Doctor No  Principal Problem:Left ventricular assist device (LVAD) complication    Subjective:     Interval History: No events overnight. INR 3.5 this morning. CVP 7 and Svo2 71 this morning     Continuous Infusions:   sodium chloride 0.9%      nitric oxide gas       Scheduled Meds:   amiodarone  400 mg Oral BID    aspirin  325 mg Oral Daily    furosemide  80 mg Oral BID    gabapentin  200 mg Oral BID    hepatitis A virus vaccine (PF)  1,440 Units Intramuscular Once    lisinopril  10 mg Oral BID    magnesium oxide  400 mg Oral Daily    mexiletine  200 mg Oral Q8H    mirtazapine  15 mg Oral QHS    pantoprazole  40 mg Oral BID    polyethylene glycol  17 g Oral Daily    potassium chloride  40 mEq Oral BID    senna-docusate 8.6-50 mg  1 tablet Oral BID    spironolactone  12.5 mg Oral Daily    venlafaxine  150 mg Oral Daily     PRN Meds:sodium chloride 0.9%, ALPRAZolam, pneumoc 13-amber conj-dip cr(PF), promethazine, senna-docusate 8.6-50 mg, sucralfate, zolpidem    Review of patient's allergies indicates:   Allergen Reactions    Adhesive Blisters     Reaction to area in chest and up only    Codeine Itching     Objective:     Vital Signs (Most Recent):  Temp: 97.8 °F (36.6 °C) (12/01/19 0700)  Pulse: 76 (12/01/19 1100)  Resp: 17 (12/01/19 1100)  BP: (!) 86/0 (12/01/19 0700)  SpO2: 98 % (12/01/19 0928) Vital Signs (24h Range):  Temp:  [97.8 °F (36.6 °C)-98.6 °F (37 °C)] 97.8 °F (36.6 °C)  Pulse:  [] 76  Resp:  [11-46] 17  SpO2:  [95 %-99 %] 98 %  BP: (70-86)/(0) 86/0     Patient Vitals for the past 72 hrs (Last 3 readings):   Weight   11/30/19 0500 105.6 kg (232 lb 12.9 oz)   11/29/19 0500 105.7 kg (233 lb 0.4 oz)     Body mass index is 36.46  kg/m².      Intake/Output Summary (Last 24 hours) at 12/1/2019 1112  Last data filed at 12/1/2019 0909  Gross per 24 hour   Intake 240 ml   Output 3600 ml   Net -3360 ml       Hemodynamic Parameters:       Telemetry: NSR     Physical Exam  Constitutional: She is oriented to person, place, and time. She appears well-developed and well-nourished.   HENT:   Mouth/Throat: Oropharynx is clear and moist.   Eyes: EOM are normal.   Neck: No JVD present.   Cardiovascular: Normal rate, regular rhythm and intact distal pulses.   Smooth VAD hum   Pulmonary/Chest: Effort normal and breath sounds normal. No respiratory distress. She has no rales.   Abdominal: Soft. Bowel sounds are normal. She exhibits no distension. There is no tenderness. There is no guarding.   Musculoskeletal: She exhibits no edema.   Neurological: She is alert and oriented to person, place, and time.   Skin: Skin is warm.  Significant Labs:  CBC:  Recent Labs   Lab 11/29/19  0501 11/30/19  0434 12/01/19  0321   WBC 5.00 5.07 5.12   RBC 4.53 4.52 4.47   HGB 11.2* 11.0* 10.6*   HCT 38.7 38.4 38.0    300 309   MCV 85 85 85   MCH 24.7* 24.3* 23.7*   MCHC 28.9* 28.6* 27.9*     BNP:  Recent Labs   Lab 11/25/19  0357 11/27/19  0319 11/29/19  0501   * 217* 161*     CMP:  Recent Labs   Lab 11/25/19  0357  11/27/19  0319  11/29/19  0501 11/30/19  0434 12/01/19  0321   *   < > 149*   < > 127* 109 129*   CALCIUM 9.7   < > 9.6   < > 9.6 9.6 9.6   ALBUMIN 3.5  --  3.4*  --  3.3*  --   --    PROT 7.0  --  6.9  --  7.0  --   --       < > 140   < > 141 138 140   K 4.0   < > 4.2   < > 4.6 4.3 4.1   CO2 29   < > 29   < > 25 28 29      < > 101   < > 104 101 101   BUN 31*   < > 29*   < > 28* 29* 31*   CREATININE 1.5*   < > 1.7*   < > 1.7* 1.7* 2.0*   ALKPHOS 90  --  94  --  96  --   --    ALT 15  --  16  --  16  --   --    AST 19  --  21  --  23  --   --    BILITOT 0.5  --  0.3  --  0.2  --   --     < > = values in this interval not displayed.       Coagulation:   Recent Labs   Lab 11/29/19  0501 11/30/19  0434 12/01/19  0321   INR 2.4* 2.9* 3.5*   APTT 36.5* 35.7* 37.4*     LDH:  Recent Labs   Lab 11/29/19  0501 11/30/19  0434 12/01/19  0321    252 218     Microbiology:  Microbiology Results (last 7 days)     ** No results found for the last 168 hours. **          I have reviewed all pertinent labs within the past 24 hours.    Estimated Creatinine Clearance: 42.1 mL/min (A) (based on SCr of 2 mg/dL (H)).    Diagnostic Results:  I have reviewed and interpreted all pertinent imaging results/findings within the past 24 hours.    Assessment and Plan:     48 yo WF with NICM, s/p HM3 implantation 9/10/19 (post up coarse uncomplicated with the exception of+ diaphragmatic stimulation of LV lead and pleural effusion with chest tube placement, atrial flutter with NADINE/DCCV), V-fib reported in setting of hypokalemia with appropriate shock from ICD on Amiodarone prior to LVAD placement; and recent hospitalizations for ventricular arrythmias; started on Mexilitine.  She was seen in EP clinic today and she continues to have multiple VT episodes with some ATP therapy, although no ICD shocks since starting mexiletine 10/24/2019. One slow VT episode 2.5hrs 11/3/2019. Patient asymptomatic with LVAD. On coumadin. No AFL since post-op event (paced out of rhythm 9/24/2019). CHB with 100% V pacing (LV lead off). She does not feel well. Dry heaving with mexiletine. Taking phenergan PRN. 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   She was seen in HTS clinic and reported 4 low flow alarms the last 4 nights.  She reports they occur in her sleep and last for only a few seconds.  By the time she wakes up; they quickly stop.  She does report to possibly being little volume overloaded.  She hasn't noticed weight gain at home but thinks she may have little extra fluid.  She denies SOB, chest pain, palpitations, LEGER. In review of her records: she  was 223lbs on 10/24/19 during previous hospitalization and is 235lbs now.  Her lasix had previously been decreased to prn.     * Left ventricular assist device (LVAD) complication  -hx low flow alarms prior to admit, last LFA 11/21  -Possibly secondary to ADHF--> RV failure  -Formal report of CT unremarkable; however, it was a non contrast as creat was elevated above baseline on admit and inflow and outflow cannulas not accurately visualized     GLO (acute kidney injury)  Cr elevated to 2. Will dose lisinopril one a day. Also decrease lasix to 80 mg once a day     Organ transplant candidate  - will attempt to list with exemption due to VT with ICD shocks and RV failure (will not tolerate )  - approved for transplant on 11/26, awaiting insurance approval for listing.    Essential hypertension  -Patient is non-pulsatile  -Doppler goal 60-90 mmHg  -Antihypertensive medications include  Lisinopril  -Hydralazine was started on evening of admit and then changed to low dose Norvasc and Spironolactone to aid with BP and low K+.  Hesitated on increasing Lisinopril because of active diuresis and concern for fluctuation of creatinine    - amlodipine now on hold in setting of symptomatic hypotension and LFAs on 11/16      Ventricular tachycardia  - Per EP office visit on day of admission: She continues to have multiple VT episodes with some ATP therapy, although no ICD shocks since starting mexiletine 10/24/2019. One slow VT episode 2.5hrs 11/3/2019. Patient asymptomatic with LVAD. On coumadin. No AFL since post-op event (paced out of rhythm 9/24/2019). CHB with 100% V pacing (LV lead off).   - Plan was to continue current regimen despite Mexiletine making her nauseous.  - Decreased Amiodarone to daily per EP plan on discharge last hospitalization   - Shocked x 3 11/25 for MMVT   - continues on amio ggt s/p amio load and increased mexiltine to 200 per EP. Still following, appreciate assistance     LVAD (left ventricular  assist device) present  -HeartMate 3 Implanted 9/10/19 as DT.  -Implanted by Dr. Walden  -INR  3.5 today. Likely from Amio. Will dc coumadin for today   -Antiplatelets:  mg.  -LDH is stable Will monitor daily.   -Speed set at 5200. Decreased 11/25 in setting of Vt with ICD shocks associated with valsalva and small LV.  Decreased to 5100 yesterday after reviewing Echo.   - Epi weaned off . Decrease Simon to 5 ppm and if CVP states below 10 will stop her Simon completely   -Not listed for OHTx: was declined due to pulmonary hypertension and incomplete care giving plan. Patient now has care giving plan. 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 Urgent selection 11/26, approved for transplant. Awaiting listing (insurance approval).    Procedure: Device Interrogation Including analysis of device parameters  Current Settings: Ventricular Assist Device  Review of device function is stable    TXP LVAD INTERROGATIONS 12/1/2019 12/1/2019 12/1/2019 12/1/2019 12/1/2019 12/1/2019 12/1/2019   Type HeartMate3 HeartMate3 HeartMate3 HeartMate3 HeartMate3 HeartMate3 HeartMate3   Flow 3.6 3.6 3.7 3.8 3.8 4 4.1   Speed 5100 5100 5100 5100 5100 5100 5100   PI 4.8 4.8 4.4 4.3 4.7 3.2 3.5   Power ([place]) 3.5 3.5 3.5 3.6 3.5 3.7 3.5   LSL 4700 4700 4700 4700 4700 4700 4700   Pulsatility No Pulse No Pulse No Pulse No Pulse No Pulse Intermittent pulse -       CKD (chronic kidney disease)  - Baseline creat appears 1.3-1.6  - Will monitor with diuresis     Acute on chronic combined systolic and diastolic heart failure  -NICM  -Last 2D Echo 11/25/19. Severe TR, IVC 8, normal RV function?, LV/RV dimensions 4.5/3.9 @ 5300. Dr arroyo to review. Will repeat again today due to VT with ATP while on bedside commode (3x episodes of VT while on commode) at decreased speed of 5200  -lasix on hold per Dr Arroyo- likely needs CVP slightly higher (10-12?) due to renal function and HM3 with restrictive filling? Monitor closely   - now on EPI  .02 and NO @ 10 and SvO2 67   - CVP 9-10 this AM  - GDMT: Continue ACE 10 BID, aldactone 12.5   - was on lasix 20 PRN at home PTA  -2g Na dietary restriction, 1500 mL fluid restriction, strict I/Os      ICD (implantable cardioverter-defibrillator) in place  - Med[employer]onic ICD  - Shocked x 3 11/25 for MMVT   - continues on amio ggt s/p amio load and increased mexiltine to 200 per EP. Still following, appreciate assistance   - See above VT        Dandre Hunter MD  Heart Transplant  [facility]

## 2019-12-01 NOTE — PROGRESS NOTES
11/30/2019  Trevin Sow    Current provider:  Vi Bryant MD      I, Trevin Sow, rounded on Deborah Navas to ensure all mechanical assist device settings (IABP or VAD) were appropriate and all parameters were within limits.  I was able to ensure all back up equipment was present, the staff had no issues, and the Perfusion Department daily rounding was complete.    8:42 PM

## 2019-12-01 NOTE — PLAN OF CARE
No acute events throughout day, VS and assessment per flow sheet, see below for updates:     Pulmonary: Patient remains on 4L NC with 10 PPM NO. Attempted to wean NO to 5 PPM, however repeat SVO2 was 43%. No complains of SOB.    Cardiovascular: 100% paced on monitor. HM3 with flows ranging 3-3.8, speed 5100, pi 2.8-5.5, power 3.4-3.6. Patient had episodes of speed dropping to 4700 with associated decrease in flows and slight lightheadedness. VAD coordinator and team updated on change, VBG ordered, nitric increased. Dopplers 86/76/78. CVP 10/8/9.     Neurological: AAOx4, sat on EOB x5 minutes, terminated related to decreased flows as low as 2.1.    Gastrointestinal: No BM.    Genitourinary: Voids per bedpan    Endocrine: WNL    Skin/Bath: VAD dressing changed this shift, DLES 2 Date of last CHG bath given: 11.30.19 PM    Infusions: none    Patient progressing towards goals as tolerated, plan of care communicated and reviewed with Deborah Navas and family, all concerns addressed, will continue to monitor.     Roz Heredia RN    CMICU DAILY GOALS       A: Awake    RASS: Goal - RASS Goal: 0-->alert and calm  Actual - RASS (Patel Agitation-Sedation Scale): 0-->alert and calm   Restraint necessity:    B: Breath   SBT: Not intubated   C: Coordinate A & B, analgesics/sedatives   Pain: managed    SAT: Not intubated  D: Delirium   CAM-ICU: Overall CAM-ICU: Negative  E: Early Mobility   MOVE Screen: Fail   Activity: Activity Management: activity adjusted per tolerance  FAS: Feeding/Nutrition   Diet order: Diet/Nutrition Received: low saturated fat/low cholesterol, 2 gram sodium, restrict fluids,   Fluid restriction: Fluid Requirement: 1500 mL  T: Thrombus   DVT prophylaxis: VTE Required Core Measure: Pharmacological prophylaxis initiated/maintained  H: HOB Elevation   Head of Bed (HOB): HOB at 15 degrees  U: Ulcer Prophylaxis   GI: yes  G: Glucose control   managed    S: Skin   Bundle compliance: yes    Bathing/Skin Care: bath, chlorhexidine, bath, partial, dressed/undressed Date: 11/30/19  B: Bowel Function   no issues   I: Indwelling Catheters   Fonseca necessity:     CVC necessity: Yes   IPAD offered: No  D: De-escalation Antibx   No  Plan for the day   Monitor LVAD  Family/Goals of care/Code Status   Code Status: Full Code     No acute events throughout day, VS and assessment per flow sheet, patient progressing towards goals as tolerated, plan of care reviewed with Deborah Navas and family, all concerns addressed, will continue to monitor.

## 2019-12-01 NOTE — SUBJECTIVE & OBJECTIVE
Interval History: No events overnight. INR 3.5 this morning. CVP 7 and Svo2 71 this morning     Continuous Infusions:   sodium chloride 0.9%      nitric oxide gas       Scheduled Meds:   amiodarone  400 mg Oral BID    aspirin  325 mg Oral Daily    furosemide  80 mg Oral BID    gabapentin  200 mg Oral BID    hepatitis A virus vaccine (PF)  1,440 Units Intramuscular Once    lisinopril  10 mg Oral BID    magnesium oxide  400 mg Oral Daily    mexiletine  200 mg Oral Q8H    mirtazapine  15 mg Oral QHS    pantoprazole  40 mg Oral BID    polyethylene glycol  17 g Oral Daily    potassium chloride  40 mEq Oral BID    senna-docusate 8.6-50 mg  1 tablet Oral BID    spironolactone  12.5 mg Oral Daily    venlafaxine  150 mg Oral Daily     PRN Meds:sodium chloride 0.9%, ALPRAZolam, pneumoc 13-amber conj-dip cr(PF), promethazine, senna-docusate 8.6-50 mg, sucralfate, zolpidem    Review of patient's allergies indicates:   Allergen Reactions    Adhesive Blisters     Reaction to area in chest and up only    Codeine Itching     Objective:     Vital Signs (Most Recent):  Temp: 97.8 °F (36.6 °C) (12/01/19 0700)  Pulse: 76 (12/01/19 1100)  Resp: 17 (12/01/19 1100)  BP: (!) 86/0 (12/01/19 0700)  SpO2: 98 % (12/01/19 0928) Vital Signs (24h Range):  Temp:  [97.8 °F (36.6 °C)-98.6 °F (37 °C)] 97.8 °F (36.6 °C)  Pulse:  [] 76  Resp:  [11-46] 17  SpO2:  [95 %-99 %] 98 %  BP: (70-86)/(0) 86/0     Patient Vitals for the past 72 hrs (Last 3 readings):   Weight   11/30/19 0500 105.6 kg (232 lb 12.9 oz)   11/29/19 0500 105.7 kg (233 lb 0.4 oz)     Body mass index is 36.46 kg/m².      Intake/Output Summary (Last 24 hours) at 12/1/2019 1112  Last data filed at 12/1/2019 0909  Gross per 24 hour   Intake 240 ml   Output 3600 ml   Net -3360 ml       Hemodynamic Parameters:       Telemetry: NSR     Physical Exam  Constitutional: She is oriented to person, place, and time. She appears well-developed and well-nourished.   HENT:    Mouth/Throat: Oropharynx is clear and moist.   Eyes: EOM are normal.   Neck: No JVD present.   Cardiovascular: Normal rate, regular rhythm and intact distal pulses.   Smooth VAD hum   Pulmonary/Chest: Effort normal and breath sounds normal. No respiratory distress. She has no rales.   Abdominal: Soft. Bowel sounds are normal. She exhibits no distension. There is no tenderness. There is no guarding.   Musculoskeletal: She exhibits no edema.   Neurological: She is alert and oriented to person, place, and time.   Skin: Skin is warm.  Significant Labs:  CBC:  Recent Labs   Lab 11/29/19  0501 11/30/19  0434 12/01/19  0321   WBC 5.00 5.07 5.12   RBC 4.53 4.52 4.47   HGB 11.2* 11.0* 10.6*   HCT 38.7 38.4 38.0    300 309   MCV 85 85 85   MCH 24.7* 24.3* 23.7*   MCHC 28.9* 28.6* 27.9*     BNP:  Recent Labs   Lab 11/25/19  0357 11/27/19  0319 11/29/19  0501   * 217* 161*     CMP:  Recent Labs   Lab 11/25/19  0357  11/27/19  0319  11/29/19  0501 11/30/19  0434 12/01/19  0321   *   < > 149*   < > 127* 109 129*   CALCIUM 9.7   < > 9.6   < > 9.6 9.6 9.6   ALBUMIN 3.5  --  3.4*  --  3.3*  --   --    PROT 7.0  --  6.9  --  7.0  --   --       < > 140   < > 141 138 140   K 4.0   < > 4.2   < > 4.6 4.3 4.1   CO2 29   < > 29   < > 25 28 29      < > 101   < > 104 101 101   BUN 31*   < > 29*   < > 28* 29* 31*   CREATININE 1.5*   < > 1.7*   < > 1.7* 1.7* 2.0*   ALKPHOS 90  --  94  --  96  --   --    ALT 15  --  16  --  16  --   --    AST 19  --  21  --  23  --   --    BILITOT 0.5  --  0.3  --  0.2  --   --     < > = values in this interval not displayed.      Coagulation:   Recent Labs   Lab 11/29/19  0501 11/30/19  0434 12/01/19 0321   INR 2.4* 2.9* 3.5*   APTT 36.5* 35.7* 37.4*     LDH:  Recent Labs   Lab 11/29/19  0501 11/30/19  0434 12/01/19  0321    252 218     Microbiology:  Microbiology Results (last 7 days)     ** No results found for the last 168 hours. **          I have reviewed all  pertinent labs within the past 24 hours.    Estimated Creatinine Clearance: 42.1 mL/min (A) (based on SCr of 2 mg/dL (H)).    Diagnostic Results:  I have reviewed and interpreted all pertinent imaging results/findings within the past 24 hours.

## 2019-12-01 NOTE — NURSING
CMICU DAILY GOALS       A: Awake    RASS: Goal - RASS Goal: 0-->alert and calm  Actual - RASS (Patel Agitation-Sedation Scale): 0-->alert and calm   Restraint necessity:    B: Breath   SBT: Not intubated   C: Coordinate A & B, analgesics/sedatives   Pain: managed    SAT: Not intubated  D: Delirium   CAM-ICU: Overall CAM-ICU: Negative  E: Early Mobility   MOVE Screen: Pass   Activity: Activity Management: activity adjusted per tolerance  FAS: Feeding/Nutrition   Diet order: Diet/Nutrition Received: 2 gram sodium,   Fluid restriction: Fluid Requirement: 1500 mL  T: Thrombus   DVT prophylaxis: VTE Required Core Measure: Pharmacological prophylaxis initiated/maintained  H: HOB Elevation   Head of Bed (HOB): HOB at 30 degrees  U: Ulcer Prophylaxis   GI: yes  G: Glucose control   managed    S: Skin   Bundle compliance: yes   Bathing/Skin Care: bath, chlorhexidine, bath, partial, dressed/undressed Date: 11/30    B: Bowel Function   no issues   I: Indwelling Catheters   Fonseca necessity:     CVC necessity: yes   IPAD offered: No  D: De-escalation Antibx   No  Plan for the day   Wean Vero  Family/Goals of care/Code Status   Code Status: Full Code     No acute events throughout day, VS and assessment per flow sheet, patient progressing towards goals as tolerated, plan of care reviewed with Deborah Navas and family, all concerns addressed, will continue to monitor.

## 2019-12-02 LAB
ALBUMIN SERPL BCP-MCNC: 3.3 G/DL (ref 3.5–5.2)
ALLENS TEST: ABNORMAL
ALLENS TEST: ABNORMAL
ALP SERPL-CCNC: 92 U/L (ref 55–135)
ALT SERPL W/O P-5'-P-CCNC: 14 U/L (ref 10–44)
ANION GAP SERPL CALC-SCNC: 12 MMOL/L (ref 8–16)
APTT BLDCRRT: 39.3 SEC (ref 21–32)
AST SERPL-CCNC: 19 U/L (ref 10–40)
BASOPHILS # BLD AUTO: 0.08 K/UL (ref 0–0.2)
BASOPHILS NFR BLD: 1.6 % (ref 0–1.9)
BILIRUB DIRECT SERPL-MCNC: 0.2 MG/DL (ref 0.1–0.3)
BILIRUB SERPL-MCNC: 0.3 MG/DL (ref 0.1–1)
BNP SERPL-MCNC: 129 PG/ML (ref 0–99)
BUN SERPL-MCNC: 30 MG/DL (ref 6–20)
CALCIUM SERPL-MCNC: 9.2 MG/DL (ref 8.7–10.5)
CHLORIDE SERPL-SCNC: 102 MMOL/L (ref 95–110)
CO2 SERPL-SCNC: 24 MMOL/L (ref 23–29)
CREAT SERPL-MCNC: 1.7 MG/DL (ref 0.5–1.4)
CRP SERPL-MCNC: 8.2 MG/L (ref 0–8.2)
DELSYS: ABNORMAL
DIFFERENTIAL METHOD: ABNORMAL
EOSINOPHIL # BLD AUTO: 0.2 K/UL (ref 0–0.5)
EOSINOPHIL NFR BLD: 4 % (ref 0–8)
ERYTHROCYTE [DISTWIDTH] IN BLOOD BY AUTOMATED COUNT: 16.2 % (ref 11.5–14.5)
EST. GFR  (AFRICAN AMERICAN): 40 ML/MIN/1.73 M^2
EST. GFR  (NON AFRICAN AMERICAN): 34.7 ML/MIN/1.73 M^2
GLUCOSE SERPL-MCNC: 114 MG/DL (ref 70–110)
HCO3 UR-SCNC: 27.9 MMOL/L (ref 24–28)
HCO3 UR-SCNC: 32.4 MMOL/L (ref 24–28)
HCT VFR BLD AUTO: 37.9 % (ref 37–48.5)
HGB BLD-MCNC: 10.8 G/DL (ref 12–16)
IMM GRANULOCYTES # BLD AUTO: 0.01 K/UL (ref 0–0.04)
IMM GRANULOCYTES NFR BLD AUTO: 0.2 % (ref 0–0.5)
INR PPP: 3.9 (ref 0.8–1.2)
LDH SERPL L TO P-CCNC: 234 U/L (ref 110–260)
LYMPHOCYTES # BLD AUTO: 1.1 K/UL (ref 1–4.8)
LYMPHOCYTES NFR BLD: 22.6 % (ref 18–48)
MAGNESIUM SERPL-MCNC: 2.4 MG/DL (ref 1.6–2.6)
MCH RBC QN AUTO: 24.3 PG (ref 27–31)
MCHC RBC AUTO-ENTMCNC: 28.5 G/DL (ref 32–36)
MCV RBC AUTO: 85 FL (ref 82–98)
MONOCYTES # BLD AUTO: 0.4 K/UL (ref 0.3–1)
MONOCYTES NFR BLD: 8.4 % (ref 4–15)
NEUTROPHILS # BLD AUTO: 3.2 K/UL (ref 1.8–7.7)
NEUTROPHILS NFR BLD: 63.2 % (ref 38–73)
NRBC BLD-RTO: 0 /100 WBC
PCO2 BLDA: 49.6 MMHG (ref 35–45)
PCO2 BLDA: 58 MMHG (ref 35–45)
PH SMN: 7.36 [PH] (ref 7.35–7.45)
PH SMN: 7.36 [PH] (ref 7.35–7.45)
PHOSPHATE SERPL-MCNC: 4.5 MG/DL (ref 2.7–4.5)
PLATELET # BLD AUTO: 307 K/UL (ref 150–350)
PMV BLD AUTO: 9.8 FL (ref 9.2–12.9)
PO2 BLDA: 35 MMHG (ref 40–60)
PO2 BLDA: 36 MMHG (ref 40–60)
POC BE: 2 MMOL/L
POC BE: 7 MMOL/L
POC SATURATED O2: 64 % (ref 95–100)
POC SATURATED O2: 65 % (ref 95–100)
POC TCO2: 29 MMOL/L (ref 24–29)
POC TCO2: 34 MMOL/L (ref 24–29)
POTASSIUM SERPL-SCNC: 5.1 MMOL/L (ref 3.5–5.1)
PREALB SERPL-MCNC: 30 MG/DL (ref 20–43)
PROT SERPL-MCNC: 6.7 G/DL (ref 6–8.4)
PROTHROMBIN TIME: 38.5 SEC (ref 9–12.5)
RBC # BLD AUTO: 4.44 M/UL (ref 4–5.4)
SAMPLE: ABNORMAL
SAMPLE: ABNORMAL
SITE: ABNORMAL
SITE: ABNORMAL
SODIUM SERPL-SCNC: 138 MMOL/L (ref 136–145)
WBC # BLD AUTO: 5.01 K/UL (ref 3.9–12.7)

## 2019-12-02 PROCEDURE — 99291 CRITICAL CARE FIRST HOUR: CPT | Mod: NTX,,, | Performed by: PHYSICIAN ASSISTANT

## 2019-12-02 PROCEDURE — 99900035 HC TECH TIME PER 15 MIN (STAT): Mod: NTX

## 2019-12-02 PROCEDURE — 86140 C-REACTIVE PROTEIN: CPT | Mod: NTX

## 2019-12-02 PROCEDURE — 25000003 PHARM REV CODE 250: Mod: NTX | Performed by: STUDENT IN AN ORGANIZED HEALTH CARE EDUCATION/TRAINING PROGRAM

## 2019-12-02 PROCEDURE — 25000003 PHARM REV CODE 250: Mod: NTX | Performed by: HOSPITALIST

## 2019-12-02 PROCEDURE — 85730 THROMBOPLASTIN TIME PARTIAL: CPT | Mod: NTX

## 2019-12-02 PROCEDURE — 84134 ASSAY OF PREALBUMIN: CPT | Mod: NTX

## 2019-12-02 PROCEDURE — 85610 PROTHROMBIN TIME: CPT | Mod: NTX

## 2019-12-02 PROCEDURE — 99291 PR CRITICAL CARE, E/M 30-74 MINUTES: ICD-10-PCS | Mod: NTX,,, | Performed by: PHYSICIAN ASSISTANT

## 2019-12-02 PROCEDURE — 25000003 PHARM REV CODE 250: Mod: NTX | Performed by: PHYSICIAN ASSISTANT

## 2019-12-02 PROCEDURE — 63600175 PHARM REV CODE 636 W HCPCS: Mod: NTX | Performed by: PHYSICIAN ASSISTANT

## 2019-12-02 PROCEDURE — 27000248 HC VAD-ADDITIONAL DAY: Mod: NTX

## 2019-12-02 PROCEDURE — 84100 ASSAY OF PHOSPHORUS: CPT | Mod: NTX

## 2019-12-02 PROCEDURE — 83615 LACTATE (LD) (LDH) ENZYME: CPT | Mod: NTX

## 2019-12-02 PROCEDURE — 80048 BASIC METABOLIC PNL TOTAL CA: CPT | Mod: NTX

## 2019-12-02 PROCEDURE — 85025 COMPLETE CBC W/AUTO DIFF WBC: CPT | Mod: NTX

## 2019-12-02 PROCEDURE — 83735 ASSAY OF MAGNESIUM: CPT | Mod: NTX

## 2019-12-02 PROCEDURE — 93750 PR INTERROGATE VENT ASSIST DEV, IN PERSON, W PHYSICIAN ANALYSIS: ICD-10-PCS | Mod: NTX,,, | Performed by: INTERNAL MEDICINE

## 2019-12-02 PROCEDURE — 97530 THERAPEUTIC ACTIVITIES: CPT | Mod: NTX

## 2019-12-02 PROCEDURE — 82803 BLOOD GASES ANY COMBINATION: CPT | Mod: NTX

## 2019-12-02 PROCEDURE — 93750 INTERROGATION VAD IN PERSON: CPT | Mod: NTX,,, | Performed by: INTERNAL MEDICINE

## 2019-12-02 PROCEDURE — 80076 HEPATIC FUNCTION PANEL: CPT | Mod: NTX

## 2019-12-02 PROCEDURE — 20000000 HC ICU ROOM: Mod: NTX

## 2019-12-02 PROCEDURE — 94799 UNLISTED PULMONARY SVC/PX: CPT | Mod: NTX

## 2019-12-02 PROCEDURE — 63600367 HC NITRIC OXIDE PER HOUR: Mod: NTX

## 2019-12-02 PROCEDURE — 83880 ASSAY OF NATRIURETIC PEPTIDE: CPT | Mod: NTX

## 2019-12-02 PROCEDURE — 94761 N-INVAS EAR/PLS OXIMETRY MLT: CPT | Mod: NTX

## 2019-12-02 PROCEDURE — 27000221 HC OXYGEN, UP TO 24 HOURS: Mod: NTX

## 2019-12-02 RX ADMIN — PANTOPRAZOLE SODIUM 40 MG: 40 TABLET, DELAYED RELEASE ORAL at 09:12

## 2019-12-02 RX ADMIN — SENNOSIDES AND DOCUSATE SODIUM 1 TABLET: 8.6; 5 TABLET ORAL at 09:12

## 2019-12-02 RX ADMIN — AMIODARONE HYDROCHLORIDE 400 MG: 200 TABLET ORAL at 09:12

## 2019-12-02 RX ADMIN — ZOLPIDEM TARTRATE 5 MG: 5 TABLET ORAL at 09:12

## 2019-12-02 RX ADMIN — MEXILETINE HYDROCHLORIDE 200 MG: 200 CAPSULE ORAL at 09:12

## 2019-12-02 RX ADMIN — POLYETHYLENE GLYCOL 3350 17 G: 17 POWDER, FOR SOLUTION ORAL at 09:12

## 2019-12-02 RX ADMIN — MIRTAZAPINE 15 MG: 15 TABLET, FILM COATED ORAL at 09:12

## 2019-12-02 RX ADMIN — EPINEPHRINE 0.02 MCG/KG/MIN: 1 INJECTION PARENTERAL at 02:12

## 2019-12-02 RX ADMIN — GABAPENTIN 200 MG: 100 CAPSULE ORAL at 09:12

## 2019-12-02 RX ADMIN — ALPRAZOLAM 0.25 MG: 0.25 TABLET ORAL at 05:12

## 2019-12-02 RX ADMIN — MEXILETINE HYDROCHLORIDE 200 MG: 200 CAPSULE ORAL at 02:12

## 2019-12-02 RX ADMIN — PROMETHAZINE HYDROCHLORIDE 12.5 MG: 12.5 TABLET ORAL at 11:12

## 2019-12-02 RX ADMIN — FUROSEMIDE 80 MG: 40 TABLET ORAL at 09:12

## 2019-12-02 RX ADMIN — SPIRONOLACTONE 12.5 MG: 25 TABLET, FILM COATED ORAL at 09:12

## 2019-12-02 RX ADMIN — VENLAFAXINE 150 MG: 37.5 TABLET ORAL at 09:12

## 2019-12-02 RX ADMIN — ASPIRIN 325 MG: 325 TABLET, DELAYED RELEASE ORAL at 09:12

## 2019-12-02 RX ADMIN — MEXILETINE HYDROCHLORIDE 200 MG: 200 CAPSULE ORAL at 06:12

## 2019-12-02 RX ADMIN — LISINOPRIL 10 MG: 10 TABLET ORAL at 09:12

## 2019-12-02 RX ADMIN — MAGNESIUM OXIDE TAB 400 MG (241.3 MG ELEMENTAL MG) 400 MG: 400 (241.3 MG) TAB at 09:12

## 2019-12-02 NOTE — ASSESSMENT & PLAN NOTE
-NICM  -Last 2D Echo 11/25/19. Severe TR, IVC 8, normal RV function?, LV/RV dimensions 4.5/3.9 @ 5300. Due to VT with ATP while on bedside commode (3x episodes of VT while on commode) at decreased speed of 5200 and then again to 5100 on 11/27.  - on lasix 80 PO QD   - EPI off, NO @ 10 and SvO2 65   - CVP 9-10 this AM  - GDMT: Continue ACE 10 BID, aldactone 12.5   - was on lasix 20 PRN at home PTA  -2g Na dietary restriction, 1500 mL fluid restriction, strict I/Os

## 2019-12-02 NOTE — PROGRESS NOTES
Update:    SW met with pt and mother in pt's room in order to provide continuity of care and resources. Pt reports that she was approved for Heart Transplant last week but states insurance is still pending. Pt states that she is hopeful that insurance goes through today. Pt reports that she is in need of a letter from SW to break her rental agreement for her apartment starting Jan 1, 2020. Pt requesting that letter be signed by attending physician. SW was able to produce letter and provide to pt at her request. Mother reports that she will bring letter to pt's landlord to see if this will help as pt will not be able to stay in her apt alone if discharged before transplant. Pt states that if discharged before transplant, she will stay at friend [facility]avia's. Pt and mother denying any further needs at this time. SW remains available for continued psychosocial support, education, resources, and additional d/c planning as needed.

## 2019-12-02 NOTE — CARE UPDATE
Was notified by nurse of LFA (as low as 2.1) associated with speed change (to 4700). Pt had just gotten up to sit on the side of her bed and felt lightheaded and dizzy with these episodes. VAD coordinator was informed and was concerned about change in volume status, however repeat CVP was 9 (in range 8-10). VBG ordered suggested SVO2 of 43%, down from 71% earlier. Calculated CO 4.49, CI 2.02, SVR 1229. BP stable. Plan of care discussed with heart failure fellow and decided to increase Simon back to 10 ppm. Will continue to monitor closely through the night.    Luly Chau MD  Cardiology, PGY IV  Pager: [phone]

## 2019-12-02 NOTE — ASSESSMENT & PLAN NOTE
-Patient is non-pulsatile  -Doppler goal 60-90 mmHg, currently controlled  -Antihypertensive medications include: Lisinopril  -Hydralazine was started on evening of admit and then changed to low dose Norvasc and Spironolactone to aid with BP and low K+.

## 2019-12-02 NOTE — SUBJECTIVE & OBJECTIVE
Interval History: No events overnight. INR 3.9 this morning. CVP 10 and Svo2 65 this morning. (was 43 with NO @ 5 ppm this AM). Plan to review echo with Dr Prieto and discuss listing with katya. Awaiting financial clearance for listing for OHTx.     Continuous Infusions:   sodium chloride 0.9%      nitric oxide gas       Scheduled Meds:   amiodarone  400 mg Oral BID    aspirin  325 mg Oral Daily    furosemide  80 mg Oral Daily    gabapentin  200 mg Oral BID    hepatitis A virus vaccine (PF)  1,440 Units Intramuscular Once    lisinopril  10 mg Oral Daily    magnesium oxide  400 mg Oral Daily    mexiletine  200 mg Oral Q8H    mirtazapine  15 mg Oral QHS    pantoprazole  40 mg Oral BID    polyethylene glycol  17 g Oral Daily    senna-docusate 8.6-50 mg  1 tablet Oral BID    spironolactone  12.5 mg Oral Daily    venlafaxine  150 mg Oral Daily     PRN Meds:sodium chloride 0.9%, ALPRAZolam, pneumoc 13-amber conj-dip cr(PF), promethazine, senna-docusate 8.6-50 mg, sucralfate, zolpidem    Review of patient's allergies indicates:   Allergen Reactions    Adhesive Blisters     Reaction to area in chest and up only    Codeine Itching     Objective:     Vital Signs (Most Recent):  Temp: 98.3 °F (36.8 °C) (12/02/19 1100)  Pulse: 81 (12/02/19 1112)  Resp: (!) 22 (12/02/19 1112)  BP: (!) 64/0 (12/02/19 1100)  SpO2: 98 % (12/02/19 1112) Vital Signs (24h Range):  Temp:  [97.7 °F (36.5 °C)-98.3 °F (36.8 °C)] 98.3 °F (36.8 °C)  Pulse:  [] 81  Resp:  [12-39] 22  SpO2:  [97 %-100 %] 98 %  BP: (64-82)/(0) 64/0     Patient Vitals for the past 72 hrs (Last 3 readings):   Weight   12/02/19 1000 105.6 kg (232 lb 12.9 oz)   11/30/19 0500 105.6 kg (232 lb 12.9 oz)     Body mass index is 36.46 kg/m².      Intake/Output Summary (Last 24 hours) at 12/2/2019 1131  Last data filed at 12/2/2019 0900  Gross per 24 hour   Intake 1610 ml   Output 1400 ml   Net 210 ml       Hemodynamic Parameters:     Telemetry: Paced     Physical  Exam    Constitutional: She is oriented to person, place, and time. She appears well-developed and well-nourished.   HENT:   Mouth/Throat: Oropharynx is clear and moist.   Eyes: EOM are normal.   Neck: JVD mid neck present.   Cardiovascular: Normal rate, regular rhythm and intact distal pulses.   Smooth VAD hum   Pulmonary/Chest: Effort normal and breath sounds normal. No respiratory distress. She has no rales.   Abdominal: Soft. Bowel sounds are normal. She exhibits no distension. There is no tenderness. There is no guarding.   Musculoskeletal: She exhibits no edema.   Neurological: She is alert and oriented to person, place, and time.   Skin: Skin is warm.    Significant Labs:  CBC:  Recent Labs   Lab 11/30/19  0434 12/01/19  0321 12/02/19  0523   WBC 5.07 5.12 5.01   RBC 4.52 4.47 4.44   HGB 11.0* 10.6* 10.8*   HCT 38.4 38.0 37.9    309 307   MCV 85 85 85   MCH 24.3* 23.7* 24.3*   MCHC 28.6* 27.9* 28.5*     BNP:  Recent Labs   Lab 11/27/19  0319 11/29/19  0501 12/02/19  0523   * 161* 129*     CMP:  Recent Labs   Lab 11/27/19  0319  11/29/19  0501 11/30/19  0434 12/01/19  0321 12/02/19  0523   *   < > 127* 109 129* 114*   CALCIUM 9.6   < > 9.6 9.6 9.6 9.2   ALBUMIN 3.4*  --  3.3*  --   --  3.3*   PROT 6.9  --  7.0  --   --  6.7      < > 141 138 140 138   K 4.2   < > 4.6 4.3 4.1 5.1   CO2 29   < > 25 28 29 24      < > 104 101 101 102   BUN 29*   < > 28* 29* 31* 30*   CREATININE 1.7*   < > 1.7* 1.7* 2.0* 1.7*   ALKPHOS 94  --  96  --   --  92   ALT 16  --  16  --   --  14   AST 21  --  23  --   --  19   BILITOT 0.3  --  0.2  --   --  0.3    < > = values in this interval not displayed.      Coagulation:   Recent Labs   Lab 11/30/19 0434 12/01/19 0321 12/02/19  0523   INR 2.9* 3.5* 3.9*   APTT 35.7* 37.4* 39.3*     LDH:  Recent Labs   Lab 11/30/19  0434 12/01/19 0321 12/02/19  0523    218 234     Microbiology:  Microbiology Results (last 7 days)     ** No results found for  the last 168 hours. **          I have reviewed all pertinent labs within the past 24 hours.    Estimated Creatinine Clearance: 49.5 mL/min (A) (based on SCr of 1.7 mg/dL (H)).    Diagnostic Results:  I have reviewed and interpreted all pertinent imaging results/findings within the past 24 hours.

## 2019-12-02 NOTE — PT/OT/SLP PROGRESS
Physical Therapy Treatment    Patient Name:  Deborah Navas   MRN:  [id number]    Recommendations:     Discharge Recommendations:  home   Discharge Equipment Recommendations: none   Barriers to discharge: None    Assessment:     Deborah Navas is a 49 y.o. female admitted with a medical diagnosis of Left ventricular assist device (LVAD) complication.  She presents with the following impairments/functional limitations:  impaired cardiopulmonary response to activity, impaired endurance. Pt's limitation to mobility is decreased flow, increased PI, and vtach with activity. Pt is independent with mobility. Before session, pt's flow was 4.2. Pt sat EOB x 10 minutes with flow fluctuating but ultimately slowly decreased to 2.5. No alarms sounded. Pt went into and out of Vtach during session. RN notified. Pt reported sight lightheadedness. PT deferred standing due to flow being 2.5. Pt returned supine and flow increased to >4.0     Rehab Prognosis: Good; patient would benefit from acute skilled PT services to address these deficits and reach maximum level of function.    Recent Surgery: Procedure(s) (LRB):  INSERTION, CATHETER, RIGHT HEART (N/A) 14 Days Post-Op    Plan:     During this hospitalization, patient to be seen 2 x/week to address the identified rehab impairments via gait training, therapeutic activities, therapeutic exercises, neuromuscular re-education and progress toward the following goals:    · Plan of Care Expires:  12/26/19    Subjective     Chief Complaint: being stuck in bed   Patient/Family Comments/goals: to get better and return home   Pain/Comfort:  · Pain Rating 1: 0/10  · Pain Rating Post-Intervention 1: 0/10      Objective:     Communicated with RN prior to session and mother present in room (left before session started).  Patient found HOB elevated with telemetry, pulse ox (continuous), blood pressure cuff, central line, LVAD upon PT entry to room.     General Precautions:  Standard, LVAD, fall   Orthopedic Precautions:N/A   Braces: N/A     Functional Mobility:  · Bed Mobility:     · Scooting: independence  · Supine to Sit: independence  · Sit to Supine: independence  · Transfers: deferred 2nd to flow slowly decreasing to 2.5  · Gait: not performed       AM-PAC 6 CLICK MOBILITY  Turning over in bed (including adjusting bedclothes, sheets and blankets)?: 4  Sitting down on and standing up from a chair with arms (e.g., wheelchair, bedside commode, etc.): 4  Moving from lying on back to sitting on the side of the bed?: 4  Moving to and from a bed to a chair (including a wheelchair)?: 4  Need to walk in hospital room?: 4  Climbing 3-5 steps with a railing?: 4  Basic Mobility Total Score: 24       Therapeutic Activities and Exercises:  Educated pt on PT role/POC  Educated pt to continue with bed mobility  Pt verbalized understanding    LVAD found and remained on wall power  No alarms sounded    Sitting EOB x 10 minutes with slowly decreasing flow, increasing PI, and pt going in and out of Vtach.    Patient left HOB elevated with all lines intact, call button in reach and RN notified..    GOALS:   Multidisciplinary Problems     Physical Therapy Goals        Problem: Physical Therapy Goal    Goal Priority Disciplines Outcome Goal Variances Interventions   Physical Therapy Goal     PT, PT/OT Ongoing, Progressing     Description:  Goals to be met by: 2019     Patient will increase functional independence with mobility by performin. Upper and lower extremity exercise program x10 reps per handout, with independence with no episodes of VTach to demo' increased tolerance to activity.                       Time Tracking:     PT Received On: 19  PT Start Time: 1041     PT Stop Time: 1056  PT Total Time (min): 15 min     Billable Minutes: Therapeutic Activity 15    Treatment Type: Treatment  PT/PTA: PT     PTA Visit Number: 0     Niurka Posadas PT, DPT  2019  [phone]

## 2019-12-02 NOTE — NURSING
Notified HTS of pt with low flow alarm Flow 2.4 Speed 4700. Pt was sitting up in bed. Only lasted a few seconds until VAD numbers returned WNL. CVP 10. Doppler 78/0. Pt stated she feels okay. CARLOS A, KIM.

## 2019-12-02 NOTE — PROGRESS NOTES
12/02/2019  Christo Cooper    Current provider:  Vi Bryant MD      I, Christo Cooper, rounded on Deborah Oliva Navas to ensure all mechanical assist device settings (IABP or VAD) were appropriate and all parameters were within limits.  I was able to ensure all back up equipment was present, the staff had no issues, and the Perfusion Department daily rounding was complete.    2:17 PM

## 2019-12-02 NOTE — ASSESSMENT & PLAN NOTE
- Per EP office visit on day of admission: She continues to have multiple VT episodes with some ATP therapy, although no ICD shocks since starting mexiletine 10/24/2019. One slow VT episode 2.5hrs 11/3/2019. Patient asymptomatic with LVAD. On coumadin. No AFL since post-op event (paced out of rhythm 9/24/2019). CHB with 100% V pacing (LV lead off).   - Plan was to continue current regimen despite Mexiletine making her nauseous.  - Decreased Amiodarone to daily per EP plan on discharge last hospitalization   - Shocked x 3 11/25 for MMVT (in addition has had numerous successful ATP's for MMVT)   - S/P amio load   - now on Amio 400 BID and mexiletine increased to 200

## 2019-12-02 NOTE — ASSESSMENT & PLAN NOTE
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

## 2019-12-02 NOTE — PROGRESS NOTES
[facility]  Heart Transplant  Progress Note    Patient Name: Deborah Navas  MRN: [id number]  Admission Date: 11/13/2019  Hospital Length of Stay: 17 days  Attending Physician: Vi Bryant MD  Primary Care Provider: Primary Doctor No  Principal Problem:Left ventricular assist device (LVAD) complication    Subjective:     Interval History: No events overnight. INR 3.9 this morning. CVP 10 and Svo2 65 this morning. (was 43 with NO @ 5 ppm this AM). Plan to review echo with Dr Prieto and discuss listing with katya. Awaiting financial clearance for listing for OHTx.     Continuous Infusions:   sodium chloride 0.9%      nitric oxide gas       Scheduled Meds:   amiodarone  400 mg Oral BID    aspirin  325 mg Oral Daily    furosemide  80 mg Oral Daily    gabapentin  200 mg Oral BID    hepatitis A virus vaccine (PF)  1,440 Units Intramuscular Once    lisinopril  10 mg Oral Daily    magnesium oxide  400 mg Oral Daily    mexiletine  200 mg Oral Q8H    mirtazapine  15 mg Oral QHS    pantoprazole  40 mg Oral BID    polyethylene glycol  17 g Oral Daily    senna-docusate 8.6-50 mg  1 tablet Oral BID    spironolactone  12.5 mg Oral Daily    venlafaxine  150 mg Oral Daily     PRN Meds:sodium chloride 0.9%, ALPRAZolam, pneumoc 13-amber conj-dip cr(PF), promethazine, senna-docusate 8.6-50 mg, sucralfate, zolpidem    Review of patient's allergies indicates:   Allergen Reactions    Adhesive Blisters     Reaction to area in chest and up only    Codeine Itching     Objective:     Vital Signs (Most Recent):  Temp: 98.3 °F (36.8 °C) (12/02/19 1100)  Pulse: 81 (12/02/19 1112)  Resp: (!) 22 (12/02/19 1112)  BP: (!) 64/0 (12/02/19 1100)  SpO2: 98 % (12/02/19 1112) Vital Signs (24h Range):  Temp:  [97.7 °F (36.5 °C)-98.3 °F (36.8 °C)] 98.3 °F (36.8 °C)  Pulse:  [] 81  Resp:  [12-39] 22  SpO2:  [97 %-100 %] 98 %  BP: (64-82)/(0) 64/0     Patient Vitals for the past 72 hrs (Last 3  readings):   Weight   12/02/19 1000 105.6 kg (232 lb 12.9 oz)   11/30/19 0500 105.6 kg (232 lb 12.9 oz)     Body mass index is 36.46 kg/m².      Intake/Output Summary (Last 24 hours) at 12/2/2019 1131  Last data filed at 12/2/2019 0900  Gross per 24 hour   Intake 1610 ml   Output 1400 ml   Net 210 ml       Hemodynamic Parameters:     Telemetry: Paced     Physical Exam    Constitutional: She is oriented to person, place, and time. She appears well-developed and well-nourished.   HENT:   Mouth/Throat: Oropharynx is clear and moist.   Eyes: EOM are normal.   Neck: JVD mid neck present.   Cardiovascular: Normal rate, regular rhythm and intact distal pulses.   Smooth VAD hum   Pulmonary/Chest: Effort normal and breath sounds normal. No respiratory distress. She has no rales.   Abdominal: Soft. Bowel sounds are normal. She exhibits no distension. There is no tenderness. There is no guarding.   Musculoskeletal: She exhibits no edema.   Neurological: She is alert and oriented to person, place, and time.   Skin: Skin is warm.    Significant Labs:  CBC:  Recent Labs   Lab 11/30/19 0434 12/01/19  0321 12/02/19  0523   WBC 5.07 5.12 5.01   RBC 4.52 4.47 4.44   HGB 11.0* 10.6* 10.8*   HCT 38.4 38.0 37.9    309 307   MCV 85 85 85   MCH 24.3* 23.7* 24.3*   MCHC 28.6* 27.9* 28.5*     BNP:  Recent Labs   Lab 11/27/19 0319 11/29/19  0501 12/02/19  0523   * 161* 129*     CMP:  Recent Labs   Lab 11/27/19  0319 11/29/19  0501 11/30/19  0434 12/01/19  0321 12/02/19  0523   *   < > 127* 109 129* 114*   CALCIUM 9.6   < > 9.6 9.6 9.6 9.2   ALBUMIN 3.4*  --  3.3*  --   --  3.3*   PROT 6.9  --  7.0  --   --  6.7      < > 141 138 140 138   K 4.2   < > 4.6 4.3 4.1 5.1   CO2 29   < > 25 28 29 24      < > 104 101 101 102   BUN 29*   < > 28* 29* 31* 30*   CREATININE 1.7*   < > 1.7* 1.7* 2.0* 1.7*   ALKPHOS 94  --  96  --   --  92   ALT 16  --  16  --   --  14   AST 21  --  23  --   --  19   BILITOT 0.3  --  0.2   --   --  0.3    < > = values in this interval not displayed.      Coagulation:   Recent Labs   Lab 11/30/19  0434 12/01/19  0321 12/02/19  0523   INR 2.9* 3.5* 3.9*   APTT 35.7* 37.4* 39.3*     LDH:  Recent Labs   Lab 11/30/19  0434 12/01/19  0321 12/02/19  0523    218 234     Microbiology:  Microbiology Results (last 7 days)     ** No results found for the last 168 hours. **          I have reviewed all pertinent labs within the past 24 hours.    Estimated Creatinine Clearance: 49.5 mL/min (A) (based on SCr of 1.7 mg/dL (H)).    Diagnostic Results:  I have reviewed and interpreted all pertinent imaging results/findings within the past 24 hours.    Assessment and Plan:     50 yo WF with NICM, s/p HM3 implantation 9/10/19 (post up coarse uncomplicated with the exception of+ diaphragmatic stimulation of LV lead and pleural effusion with chest tube placement, atrial flutter with NADINE/DCCV), V-fib reported in setting of hypokalemia with appropriate shock from ICD on Amiodarone prior to LVAD placement; and recent hospitalizations for ventricular arrythmias; started on Mexilitine.  She was seen in EP clinic today and she continues to have multiple VT episodes with some ATP therapy, although no ICD shocks since starting mexiletine 10/24/2019. One slow VT episode 2.5hrs 11/3/2019. Patient asymptomatic with LVAD. On coumadin. No AFL since post-op event (paced out of rhythm 9/24/2019). CHB with 100% V pacing (LV lead off). She does not feel well. Dry heaving with mexiletine. Taking phenergan PRN. 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   She was seen in HTS clinic and reported 4 low flow alarms the last 4 nights.  She reports they occur in her sleep and last for only a few seconds.  By the time she wakes up; they quickly stop.  She does report to possibly being little volume overloaded.  She hasn't noticed weight gain at home but thinks she may have little extra fluid.  She denies  SOB, chest pain, palpitations, LEGER. In review of her records: she was 223lbs on 10/24/19 during previous hospitalization and is 235lbs now.  Her lasix had previously been decreased to prn.     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    Essential hypertension  -Patient is non-pulsatile  -Doppler goal 60-90 mmHg, currently controlled  -Antihypertensive medications include: Lisinopril  -Hydralazine was started on evening of admit and then changed to low dose Norvasc and Spironolactone to aid with BP and low K+.       Ventricular tachycardia  - Per EP office visit on day of admission: She continues to have multiple VT episodes with some ATP therapy, although no ICD shocks since starting mexiletine 10/24/2019. One slow VT episode 2.5hrs 11/3/2019. Patient asymptomatic with LVAD. On coumadin. No AFL since post-op event (paced out of rhythm 9/24/2019). CHB with 100% V pacing (LV lead off).   - Plan was to continue current regimen despite Mexiletine making her nauseous.  - Decreased Amiodarone to daily per EP plan on discharge last hospitalization   - Shocked x 3 11/25 for MMVT (in addition has had numerous successful ATP's for MMVT)   - S/P amio load   - now on Amio 400 BID and mexiletine increased to 200    LVAD (left ventricular assist device) present  - HeartMate 3 Implanted 9/10/19 as DT.  - Implanted by Dr. Walden  - INR  3.9 today. Likely from Amio. Hold coumadin again tonight.   -  Antiplatelets:  mg.  - LDH is stable Will monitor daily.   - Speed set at 5100  - Epi weaned off . Decrease Simon to 5 ppm and svo2 again dropped to 40's. Now on NO at 10.   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 Urgent selection 11/26, approved for transplant. Awaiting listing (insurance approval).    Procedure: Device Interrogation Including analysis of device parameters  Current Settings: Ventricular Assist Device  Review of device function is stable    TXP LVAD INTERROGATIONS 12/2/2019 12/2/2019 12/2/2019 12/2/2019 12/2/2019 12/2/2019 12/2/2019   Type HeartMate3 HeartMate3 HeartMate3 HeartMate3 HeartMate3 HeartMate3 HeartMate3   Flow 3.6 4.1 3.7 3.2 3.4 3.6 3.9   Speed 5100 5050 5100 5100 5150 5100 5100   PI 2.5 3.5 4.8 6.2 5.7 4.5 4.6   Power (Orellana) 3.4 3.5 3.5 3.5 3.5 3.4 3.5   LSL 4700 4700 4700 4700 4700 4700 4700   Pulsatility Intermittent pulse Intermittent pulse Intermittent pulse Intermittent pulse Intermittent pulse Intermittent pulse Intermittent pulse       CKD (chronic kidney disease)  - Baseline creat appears 1.3-1.6. 1.7 today.  - Will monitor with diuresis     Acute on chronic combined systolic and diastolic heart failure  -NICM  -Last 2D Echo 11/25/19. Severe TR, IVC 8, normal RV function?, LV/RV dimensions 4.5/3.9 @ 5300. Due to VT with ATP while on bedside commode (3x episodes of VT while on commode) at decreased speed of 5200 and then again to 5100 on 11/27.  - on lasix 80 PO QD   - EPI off, NO @ 10 and SvO2 65   - CVP 9-10 this AM  - GDMT: Continue ACE 10 BID, aldactone 12.5   - was on lasix 20 PRN at home PTA  -2g Na dietary restriction, 1500 mL fluid restriction, strict I/Os      ICD (implantable cardioverter-defibrillator) in place  - Medtronic ICD  - Shocked x 3 11/25 for MMVT   - Loaded with IV amio on 11/25. Now on amio 400 PO BID and mexiletine increased to 200 QD   - See above VT        BALDEMAR EppsC  Heart Transplant  [facility]    Uninterrupted Critical Care/Counseling Time (not including procedures): 45 minutes

## 2019-12-02 NOTE — PLAN OF CARE
CMICU DAILY GOALS       A: Awake    RASS: Goal - RASS Goal: 0-->alert and calm  Actual - RASS (Patel Agitation-Sedation Scale): 0-->alert and calm   Restraint necessity:    B: Breath   SBT: Not intubated   C: Coordinate A & B, analgesics/sedatives   Pain: managed    SAT: Not intubated  D: Delirium   CAM-ICU: Overall CAM-ICU: Negative  E: Early Mobility   MOVE Screen: Fail   Activity: Activity Management: activity adjusted per tolerance  FAS: Feeding/Nutrition   Diet order: Diet/Nutrition Received: low saturated fat/low cholesterol,   Fluid restriction: Fluid Requirement: 1500 cc FR  T: Thrombus   DVT prophylaxis: VTE Required Core Measure: Pharmacological prophylaxis initiated/maintained  H: HOB Elevation   Head of Bed (HOB): HOB at 20-30 degrees  U: Ulcer Prophylaxis   GI: yes  G: Glucose control   managed    S: Skin   Bundle compliance: yes   Bathing/Skin Care: patient refused, bedtime care Date: 12/01/19 pm shift  B: Bowel Function   no issues   I: Indwelling Catheters   Fonseca necessity:     CVC necessity: Yes   IPAD offered: Yes  D: De-escalation Antibx   No  Plan for the day   Continue with nitric, restart epi, sit on side of bed with staff.   Family/Goals of care/Code Status   Code Status: Full Code     No acute events throughout day, VS and assessment per flow sheet, patient progressing towards goals as tolerated, plan of care reviewed with Deborah Navas and family, all concerns addressed, will continue to monitor.

## 2019-12-02 NOTE — PLAN OF CARE
CMICU DAILY GOALS       A: Awake    RASS: Goal - RASS Goal: 0-->alert and calm  Actual - RASS (Patel Agitation-Sedation Scale): 0-->alert and calm   Restraint necessity:  no  B: Breath   SBT: NA   C: Coordinate A & B, analgesics/sedatives   Pain: managed    SAT: NA  D: Delirium   CAM-ICU: Overall CAM-ICU: Negative  E: Early Mobility   MOVE Screen: Pass   Activity: Activity Management: activity adjusted per tolerance  FAS: Feeding/Nutrition   Diet order: Diet/Nutrition Received: low saturated fat/low cholesterol, restrict fluids,   Fluid restriction: Fluid Requirement: 1500 cc FR  T: Thrombus   DVT prophylaxis: VTE Required Core Measure: Pharmacological prophylaxis initiated/maintained  H: HOB Elevation   Head of Bed (HOB): HOB at 15 degrees  U: Ulcer Prophylaxis   GI: yes  G: Glucose control   managed    S: Skin   Bundle compliance: yes   Bathing/Skin Care: patient refused, bedtime care Date: 12/1  B: Bowel Function   no issues   I: Indwelling Catheters   Fonseca necessity:  no   CVC necessity: No   IPAD offered: Yes  D: De-escalation Antibx   No  Plan for the day   Wean nitric as tolerated  Family/Goals of care/Code Status   Code Status: Full Code     No acute events throughout day, VS and assessment per flow sheet, patient progressing towards goals as tolerated, plan of care reviewed with Deborah Navas and family, all concerns addressed, will continue to monitor.

## 2019-12-02 NOTE — ASSESSMENT & PLAN NOTE
- Medtronic ICD  - Shocked x 3 11/25 for MMVT   - Loaded with IV amio on 11/25. Now on amio 400 PO BID and mexiletine increased to 200 QD   - See above VT

## 2019-12-02 NOTE — ASSESSMENT & PLAN NOTE
- HeartMate 3 Implanted 9/10/19 as DT.  - Implanted by Dr. Walden  - INR  3.9 today. Likely from Amio. Hold coumadin again tonight.   - Antiplatelets:  mg.  - LDH is stable Will monitor daily.   - Speed set at 5100  - Epi weaned off . Decrease Simon to 5 ppm and svo2 again dropped to 40's. Now on NO at 10.   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 Urgent selection 11/26, approved for transplant. Awaiting listing (insurance approval).    Procedure: Device Interrogation Including analysis of device parameters  Current Settings: Ventricular Assist Device  Review of device function is stable    TXP LVAD INTERROGATIONS 12/2/2019 12/2/2019 12/2/2019 12/2/2019 12/2/2019 12/2/2019 12/2/2019   Type HeartMate3 HeartMate3 HeartMate3 HeartMate3 HeartMate3 HeartMate3 HeartMate3   Flow 3.6 4.1 3.7 3.2 3.4 3.6 3.9   Speed 5100 5050 5100 5100 5150 5100 5100   PI 2.5 3.5 4.8 6.2 5.7 4.5 4.6   Power (Orellana) 3.4 3.5 3.5 3.5 3.5 3.4 3.5   LSL 4700 4700 4700 4700 4700 4700 4700   Pulsatility Intermittent pulse Intermittent pulse Intermittent pulse Intermittent pulse Intermittent pulse Intermittent pulse Intermittent pulse

## 2019-12-02 NOTE — PROGRESS NOTES
"  [facility]  Adult Nutrition  Consult Note    SUMMARY     Recommendations    1. Continue current Cardiac diet.   2. RD to monitor & follow-up.    Goals: PO intake >50%  Nutrition Goal Status: goal met  Communication of RD Recs: reviewed with RN    Reason for Assessment    Reason For Assessment: RD follow-up  Diagnosis: other (see comments)(LVAD present)  Relevant Medical History: LVAD, HLD  Interdisciplinary Rounds: attended    General Information Comments: Pt continues w/ adequate PO intake, consuming 100% of meals. NFPE not indicated, pt appears nourished w/ no physical signs of malnutrition.   Nutrition Discharge Planning: Adequate PO intake    Nutrition/Diet History    Patient Reported Diet/Restrictions/Preferences: low salt  Spiritual, Cultural Beliefs, Anglican Practices, Values that Affect Care: no  Factors Affecting Nutritional Intake: None identified at this time    Anthropometrics    Temp: 98.2 °F (36.8 °C)  Height Method: Stated  Height: 5' 7" (170.2 cm)  Height (inches): 67 in  Weight Method: Bed Scale  Weight: 105.6 kg (232 lb 12.9 oz)  Weight (lb): 232.81 lb  Ideal Body Weight (IBW), Female: 135 lb  % Ideal Body Weight, Female (lb): 172.45 lb  BMI (Calculated): 36.5  BMI Grade: 35 - 39.9 - obesity - grade II    Lab/Procedures/Meds    Pertinent Labs Reviewed: reviewed  Pertinent Labs Comments: BUN 30, Creat 1.7, GFR 34.7  Pertinent Medications Reviewed: reviewed  Pertinent Medications Comments: Warfarin    Estimated/Assessed Needs    Weight Used For Calorie Calculations: 105.6 kg (232 lb 12.9 oz)     Energy Calorie Requirements (kcal): 2136 kcal/d  Energy Need Method: Boone-St Jeor(1.25 PAL)     Protein Requirements: 106 g/d (1 g/kg)  Weight Used For Protein Calculations: 105.6 kg (232 lb 12.9 oz)     Estimated Fluid Requirement Method: other (see comments)(Per MD or 1 mL/kcal)    Nutrition Prescription Ordered    Current Diet Order: Cardiac    Evaluation of Received " Nutrient/Fluid Intake    Comments: LBM: 11/30    Tolerance: tolerating    Nutrition Risk    Level of Risk/Frequency of Follow-up: (1x/week)     Assessment and Plan    No nutritional dx at this time.     Monitor and Evaluation    Food and Nutrient Intake: energy intake, food and beverage intake  Food and Nutrient Adminstration: diet order  Physical Activity and Function: nutrition-related ADLs and IADLs  Anthropometric Measurements: weight, weight change  Biochemical Data, Medical Tests and Procedures: inflammatory profile, lipid profile, glucose/endocrine profile, gastrointestinal profile, electrolyte and renal panel  Nutrition-Focused Physical Findings: overall appearance     Nutrition Follow-Up    RD Follow-up?: Yes

## 2019-12-03 LAB
ALLENS TEST: ABNORMAL
ANION GAP SERPL CALC-SCNC: 10 MMOL/L (ref 8–16)
ANION GAP SERPL CALC-SCNC: 9 MMOL/L (ref 8–16)
APTT BLDCRRT: 40.5 SEC (ref 21–32)
BASOPHILS # BLD AUTO: 0.06 K/UL (ref 0–0.2)
BASOPHILS NFR BLD: 0.9 % (ref 0–1.9)
BUN SERPL-MCNC: 30 MG/DL (ref 6–20)
BUN SERPL-MCNC: 32 MG/DL (ref 6–20)
CALCIUM SERPL-MCNC: 9.1 MG/DL (ref 8.7–10.5)
CALCIUM SERPL-MCNC: 9.6 MG/DL (ref 8.7–10.5)
CHLORIDE SERPL-SCNC: 100 MMOL/L (ref 95–110)
CHLORIDE SERPL-SCNC: 102 MMOL/L (ref 95–110)
CO2 SERPL-SCNC: 27 MMOL/L (ref 23–29)
CO2 SERPL-SCNC: 27 MMOL/L (ref 23–29)
CREAT SERPL-MCNC: 2 MG/DL (ref 0.5–1.4)
CREAT SERPL-MCNC: 2.1 MG/DL (ref 0.5–1.4)
DELSYS: ABNORMAL
DIFFERENTIAL METHOD: ABNORMAL
EOSINOPHIL # BLD AUTO: 0.2 K/UL (ref 0–0.5)
EOSINOPHIL NFR BLD: 2.3 % (ref 0–8)
ERYTHROCYTE [DISTWIDTH] IN BLOOD BY AUTOMATED COUNT: 16.4 % (ref 11.5–14.5)
EST. GFR  (AFRICAN AMERICAN): 31 ML/MIN/1.73 M^2
EST. GFR  (AFRICAN AMERICAN): 32.8 ML/MIN/1.73 M^2
EST. GFR  (NON AFRICAN AMERICAN): 26.9 ML/MIN/1.73 M^2
EST. GFR  (NON AFRICAN AMERICAN): 28.5 ML/MIN/1.73 M^2
GLUCOSE SERPL-MCNC: 105 MG/DL (ref 70–110)
GLUCOSE SERPL-MCNC: 142 MG/DL (ref 70–110)
HCO3 UR-SCNC: 29.6 MMOL/L (ref 24–28)
HCT VFR BLD AUTO: 35.1 % (ref 37–48.5)
HGB BLD-MCNC: 10.3 G/DL (ref 12–16)
IMM GRANULOCYTES # BLD AUTO: 0.03 K/UL (ref 0–0.04)
IMM GRANULOCYTES NFR BLD AUTO: 0.5 % (ref 0–0.5)
INR PPP: 3.6 (ref 0.8–1.2)
LDH SERPL L TO P-CCNC: 236 U/L (ref 110–260)
LYMPHOCYTES # BLD AUTO: 1.2 K/UL (ref 1–4.8)
LYMPHOCYTES NFR BLD: 19.2 % (ref 18–48)
MAGNESIUM SERPL-MCNC: 2.4 MG/DL (ref 1.6–2.6)
MCH RBC QN AUTO: 24.9 PG (ref 27–31)
MCHC RBC AUTO-ENTMCNC: 29.3 G/DL (ref 32–36)
MCV RBC AUTO: 85 FL (ref 82–98)
METHEMOGLOBIN: 0.3 % (ref 0–3)
MONOCYTES # BLD AUTO: 0.5 K/UL (ref 0.3–1)
MONOCYTES NFR BLD: 7.3 % (ref 4–15)
NEUTROPHILS # BLD AUTO: 4.5 K/UL (ref 1.8–7.7)
NEUTROPHILS NFR BLD: 69.8 % (ref 38–73)
NRBC BLD-RTO: 0 /100 WBC
PCO2 BLDA: 50.3 MMHG (ref 35–45)
PH SMN: 7.38 [PH] (ref 7.35–7.45)
PHOSPHATE SERPL-MCNC: 4.4 MG/DL (ref 2.7–4.5)
PLATELET # BLD AUTO: 322 K/UL (ref 150–350)
PMV BLD AUTO: 10 FL (ref 9.2–12.9)
PO2 BLDA: 35 MMHG (ref 40–60)
POC BE: 4 MMOL/L
POC SATURATED O2: 64 % (ref 95–100)
POC TCO2: 31 MMOL/L (ref 24–29)
POTASSIUM SERPL-SCNC: 4.5 MMOL/L (ref 3.5–5.1)
POTASSIUM SERPL-SCNC: 4.7 MMOL/L (ref 3.5–5.1)
PROTHROMBIN TIME: 34.8 SEC (ref 9–12.5)
RBC # BLD AUTO: 4.14 M/UL (ref 4–5.4)
SAMPLE: ABNORMAL
SITE: ABNORMAL
SODIUM SERPL-SCNC: 136 MMOL/L (ref 136–145)
SODIUM SERPL-SCNC: 139 MMOL/L (ref 136–145)
WBC # BLD AUTO: 6.41 K/UL (ref 3.9–12.7)

## 2019-12-03 PROCEDURE — 85730 THROMBOPLASTIN TIME PARTIAL: CPT | Mod: NTX

## 2019-12-03 PROCEDURE — 63600175 PHARM REV CODE 636 W HCPCS: Mod: NTX | Performed by: PHYSICIAN ASSISTANT

## 2019-12-03 PROCEDURE — 63600367 HC NITRIC OXIDE PER HOUR: Mod: NTX

## 2019-12-03 PROCEDURE — 99291 CRITICAL CARE FIRST HOUR: CPT | Mod: NTX,,, | Performed by: PHYSICIAN ASSISTANT

## 2019-12-03 PROCEDURE — 80048 BASIC METABOLIC PNL TOTAL CA: CPT | Mod: 91,NTX

## 2019-12-03 PROCEDURE — 20000000 HC ICU ROOM: Mod: NTX

## 2019-12-03 PROCEDURE — 83615 LACTATE (LD) (LDH) ENZYME: CPT | Mod: NTX

## 2019-12-03 PROCEDURE — 25000003 PHARM REV CODE 250: Mod: NTX | Performed by: PHYSICIAN ASSISTANT

## 2019-12-03 PROCEDURE — 27000221 HC OXYGEN, UP TO 24 HOURS: Mod: NTX

## 2019-12-03 PROCEDURE — 93750 PR INTERROGATE VENT ASSIST DEV, IN PERSON, W PHYSICIAN ANALYSIS: ICD-10-PCS | Mod: NTX,,, | Performed by: INTERNAL MEDICINE

## 2019-12-03 PROCEDURE — 84100 ASSAY OF PHOSPHORUS: CPT | Mod: NTX

## 2019-12-03 PROCEDURE — 25000003 PHARM REV CODE 250: Mod: NTX | Performed by: HOSPITALIST

## 2019-12-03 PROCEDURE — 85610 PROTHROMBIN TIME: CPT | Mod: NTX

## 2019-12-03 PROCEDURE — 93750 INTERROGATION VAD IN PERSON: CPT | Mod: NTX,,, | Performed by: INTERNAL MEDICINE

## 2019-12-03 PROCEDURE — 27000248 HC VAD-ADDITIONAL DAY: Mod: NTX

## 2019-12-03 PROCEDURE — 82803 BLOOD GASES ANY COMBINATION: CPT | Mod: NTX

## 2019-12-03 PROCEDURE — 94799 UNLISTED PULMONARY SVC/PX: CPT | Mod: NTX

## 2019-12-03 PROCEDURE — 99291 PR CRITICAL CARE, E/M 30-74 MINUTES: ICD-10-PCS | Mod: NTX,,, | Performed by: PHYSICIAN ASSISTANT

## 2019-12-03 PROCEDURE — 94761 N-INVAS EAR/PLS OXIMETRY MLT: CPT | Mod: NTX

## 2019-12-03 PROCEDURE — 83735 ASSAY OF MAGNESIUM: CPT | Mod: NTX

## 2019-12-03 PROCEDURE — 85025 COMPLETE CBC W/AUTO DIFF WBC: CPT | Mod: NTX

## 2019-12-03 PROCEDURE — 27200188 HC TRANSDUCER, ART ADULT/PEDS: Mod: NTX

## 2019-12-03 PROCEDURE — 99900035 HC TECH TIME PER 15 MIN (STAT): Mod: NTX

## 2019-12-03 PROCEDURE — 25000003 PHARM REV CODE 250: Mod: NTX | Performed by: STUDENT IN AN ORGANIZED HEALTH CARE EDUCATION/TRAINING PROGRAM

## 2019-12-03 RX ORDER — LISINOPRIL 10 MG/1
10 TABLET ORAL DAILY
Status: DISCONTINUED | OUTPATIENT
Start: 2019-12-04 | End: 2019-12-05

## 2019-12-03 RX ORDER — PANTOPRAZOLE SODIUM 40 MG/1
40 TABLET, DELAYED RELEASE ORAL
Status: DISCONTINUED | OUTPATIENT
Start: 2019-12-03 | End: 2019-12-16

## 2019-12-03 RX ORDER — FUROSEMIDE 40 MG/1
80 TABLET ORAL DAILY
Status: DISCONTINUED | OUTPATIENT
Start: 2019-12-04 | End: 2019-12-07

## 2019-12-03 RX ORDER — SUCRALFATE 1 G/10ML
1 SUSPENSION ORAL
Status: DISCONTINUED | OUTPATIENT
Start: 2019-12-03 | End: 2019-12-03

## 2019-12-03 RX ORDER — FUROSEMIDE 10 MG/ML
80 INJECTION INTRAMUSCULAR; INTRAVENOUS ONCE
Status: DISCONTINUED | OUTPATIENT
Start: 2019-12-03 | End: 2019-12-03

## 2019-12-03 RX ORDER — SUCRALFATE 1 G/10ML
1 SUSPENSION ORAL
Status: DISCONTINUED | OUTPATIENT
Start: 2019-12-03 | End: 2019-12-16

## 2019-12-03 RX ORDER — FUROSEMIDE 10 MG/ML
80 INJECTION INTRAMUSCULAR; INTRAVENOUS ONCE
Status: COMPLETED | OUTPATIENT
Start: 2019-12-03 | End: 2019-12-03

## 2019-12-03 RX ORDER — AMIODARONE HYDROCHLORIDE 200 MG/1
400 TABLET ORAL 2 TIMES DAILY
Status: DISCONTINUED | OUTPATIENT
Start: 2019-12-03 | End: 2019-12-15

## 2019-12-03 RX ORDER — POLYETHYLENE GLYCOL 3350 17 G/17G
17 POWDER, FOR SOLUTION ORAL DAILY
Status: DISCONTINUED | OUTPATIENT
Start: 2019-12-04 | End: 2019-12-16

## 2019-12-03 RX ORDER — MEXILETINE HYDROCHLORIDE 200 MG/1
200 CAPSULE ORAL EVERY 8 HOURS
Status: DISCONTINUED | OUTPATIENT
Start: 2019-12-03 | End: 2019-12-14

## 2019-12-03 RX ORDER — AMOXICILLIN 250 MG
1 CAPSULE ORAL 2 TIMES DAILY
Status: DISCONTINUED | OUTPATIENT
Start: 2019-12-03 | End: 2019-12-16

## 2019-12-03 RX ADMIN — PANTOPRAZOLE SODIUM 40 MG: 40 TABLET, DELAYED RELEASE ORAL at 08:12

## 2019-12-03 RX ADMIN — MEXILETINE HYDROCHLORIDE 200 MG: 200 CAPSULE ORAL at 02:12

## 2019-12-03 RX ADMIN — ZOLPIDEM TARTRATE 5 MG: 5 TABLET ORAL at 09:12

## 2019-12-03 RX ADMIN — GABAPENTIN 200 MG: 100 CAPSULE ORAL at 08:12

## 2019-12-03 RX ADMIN — VENLAFAXINE 150 MG: 37.5 TABLET ORAL at 08:12

## 2019-12-03 RX ADMIN — ALUMINUM HYDROXIDE, MAGNESIUM HYDROXIDE, AND SIMETHICONE: 200; 200; 20 SUSPENSION ORAL at 11:12

## 2019-12-03 RX ADMIN — SENNOSIDES AND DOCUSATE SODIUM 1 TABLET: 8.6; 5 TABLET ORAL at 08:12

## 2019-12-03 RX ADMIN — SUCRALFATE 1 G: 1 SUSPENSION ORAL at 04:12

## 2019-12-03 RX ADMIN — SENNOSIDES AND DOCUSATE SODIUM 1 TABLET: 8.6; 5 TABLET ORAL at 09:12

## 2019-12-03 RX ADMIN — SPIRONOLACTONE 12.5 MG: 25 TABLET, FILM COATED ORAL at 08:12

## 2019-12-03 RX ADMIN — POLYETHYLENE GLYCOL 3350 17 G: 17 POWDER, FOR SOLUTION ORAL at 08:12

## 2019-12-03 RX ADMIN — MEXILETINE HYDROCHLORIDE 200 MG: 200 CAPSULE ORAL at 09:12

## 2019-12-03 RX ADMIN — FUROSEMIDE 80 MG: 40 TABLET ORAL at 08:12

## 2019-12-03 RX ADMIN — MIRTAZAPINE 15 MG: 15 TABLET, FILM COATED ORAL at 09:12

## 2019-12-03 RX ADMIN — PANTOPRAZOLE SODIUM 40 MG: 40 TABLET, DELAYED RELEASE ORAL at 04:12

## 2019-12-03 RX ADMIN — SUCRALFATE 1 G: 1 SUSPENSION ORAL at 08:12

## 2019-12-03 RX ADMIN — MAGNESIUM OXIDE TAB 400 MG (241.3 MG ELEMENTAL MG) 400 MG: 400 (241.3 MG) TAB at 08:12

## 2019-12-03 RX ADMIN — AMIODARONE HYDROCHLORIDE 400 MG: 200 TABLET ORAL at 09:12

## 2019-12-03 RX ADMIN — AMIODARONE HYDROCHLORIDE 400 MG: 200 TABLET ORAL at 08:12

## 2019-12-03 RX ADMIN — SUCRALFATE 1 G: 1 SUSPENSION ORAL at 10:12

## 2019-12-03 RX ADMIN — ASPIRIN 325 MG: 325 TABLET, DELAYED RELEASE ORAL at 08:12

## 2019-12-03 RX ADMIN — GABAPENTIN 200 MG: 100 CAPSULE ORAL at 09:12

## 2019-12-03 RX ADMIN — MEXILETINE HYDROCHLORIDE 200 MG: 200 CAPSULE ORAL at 05:12

## 2019-12-03 RX ADMIN — FUROSEMIDE 80 MG: 10 INJECTION, SOLUTION INTRAMUSCULAR; INTRAVENOUS at 11:12

## 2019-12-03 RX ADMIN — LISINOPRIL 10 MG: 10 TABLET ORAL at 08:12

## 2019-12-03 NOTE — ASSESSMENT & PLAN NOTE
- HeartMate 3 Implanted 9/10/19 as DT.  - Implanted by Dr. Walden  - INR  3.6 today. Likely from Amio. Dose coumadin per Pharmacist.  - Antiplatelets:  mg.  - LDH is stable Will monitor daily.   - Speed set at 5100  - Epi @ .02, NO @ 10.   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 Urgent selection 11/26, approved for transplant. Awaiting listing (insurance approval).    Procedure: Device Interrogation Including analysis of device parameters  Current Settings: Ventricular Assist Device  Review of device function is stable    TXP LVAD INTERROGATIONS 12/3/2019 12/3/2019 12/3/2019 12/3/2019 12/3/2019 12/3/2019 12/3/2019   Type HeartMate3 HeartMate3 HeartMate3 HeartMate3 HeartMate3 HeartMate3 HeartMate3   Flow 4.1 4.2 3.9 3.4 3.6 4.2 4   Speed 5100 5100 5100 5100 5100 5100 5100   PI 3.8 2.9 4.6 6.1 4.5 3 3.3   Power (Orellana) 3.5 3.6 3.5 3.5 3.6 3.5 3.6   LSL 4700 4700 4700 4700 4700 4700 4700   Pulsatility Intermittent pulse Intermittent pulse Intermittent pulse Intermittent pulse Intermittent pulse Intermittent pulse Intermittent pulse

## 2019-12-03 NOTE — PROGRESS NOTES
[facility]  Heart Transplant  Progress Note    Patient Name: Deborah Navas  MRN: [id number]  Admission Date: 11/13/2019  Hospital Length of Stay: 18 days  Attending Physician: Vi Bryant MD  Primary Care Provider: Primary Doctor No  Principal Problem:Left ventricular assist device (LVAD) complication    Subjective:     Interval History: No events overnight. INR 3.6 this morning. CVP 13 and Svo2 64 this morning. Line exchange tomorrow once INR drops. Awaiting financial clearance for listing for OHTx. C/o shaking this AM (tremors from EPI) and some heartburn.    Continuous Infusions:   sodium chloride 0.9%      epinephrine 0.02 mcg/kg/min (12/03/19 1000)    nitric oxide gas       Scheduled Meds:   amiodarone  400 mg Oral BID    aspirin  325 mg Oral Daily    furosemide  80 mg Intravenous Once    furosemide  80 mg Oral Daily    gabapentin  200 mg Oral BID    GI cocktail (mylanta 30 mL, lidocaine 2 % viscous 10 mL, dicyclomine 10 mL) 50 mL   Oral Once    hepatitis A virus vaccine (PF)  1,440 Units Intramuscular Once    lisinopril  10 mg Oral Daily    magnesium oxide  400 mg Oral Daily    mexiletine  200 mg Oral Q8H    mirtazapine  15 mg Oral QHS    pantoprazole  40 mg Oral BID    polyethylene glycol  17 g Oral Daily    senna-docusate 8.6-50 mg  1 tablet Oral BID    spironolactone  12.5 mg Oral Daily    sucralfate  1 g Oral QID (AC & HS)    venlafaxine  150 mg Oral Daily     PRN Meds:sodium chloride 0.9%, ALPRAZolam, pneumoc 13-amber conj-dip cr(PF), promethazine, senna-docusate 8.6-50 mg, zolpidem    Review of patient's allergies indicates:   Allergen Reactions    Adhesive Blisters     Reaction to area in chest and up only    Codeine Itching     Objective:     Vital Signs (Most Recent):  Temp: 98 °F (36.7 °C) (12/03/19 0701)  Pulse: 84 (12/03/19 1000)  Resp: (!) 25 (12/03/19 1000)  BP: (!) 98/0 (12/03/19 0701)  SpO2: 99 % (12/03/19 0701) Vital Signs (24h  Range):  Temp:  [97.7 °F (36.5 °C)-98.3 °F (36.8 °C)] 98 °F (36.7 °C)  Pulse:  [] 84  Resp:  [17-37] 25  SpO2:  [94 %-99 %] 99 %  BP: (64-98)/(0) 98/0     Patient Vitals for the past 72 hrs (Last 3 readings):   Weight   12/02/19 2300 104.2 kg (229 lb 11.5 oz)   12/02/19 1000 105.6 kg (232 lb 12.9 oz)     Body mass index is 35.98 kg/m².      Intake/Output Summary (Last 24 hours) at 12/3/2019 1057  Last data filed at 12/3/2019 1000  Gross per 24 hour   Intake 1840.3 ml   Output 2300 ml   Net -459.7 ml       Hemodynamic Parameters:     Telemetry: Paced     Physical Exam    Constitutional: She is oriented to person, place, and time. She appears well-developed and well-nourished. Mild tremors in UEs bilaterally  HENT:   Mouth/Throat: Oropharynx is clear and moist.   Eyes: EOM are normal.   Neck: JVD mid neck present.   Cardiovascular: Normal rate, regular rhythm and intact distal pulses.   Smooth VAD hum   Pulmonary/Chest: Effort normal and breath sounds normal. No respiratory distress. She has no rales.   Abdominal: Soft. Bowel sounds are normal. She exhibits no distension. There is no tenderness. There is no guarding.   Musculoskeletal: She exhibits no edema.   Neurological: She is alert and oriented to person, place, and time.   Skin: Skin is warm.    Significant Labs:  CBC:  Recent Labs   Lab 12/01/19  0321 12/02/19  0523 12/03/19  0403   WBC 5.12 5.01 6.41   RBC 4.47 4.44 4.14   HGB 10.6* 10.8* 10.3*   HCT 38.0 37.9 35.1*    307 322   MCV 85 85 85   MCH 23.7* 24.3* 24.9*   MCHC 27.9* 28.5* 29.3*     BNP:  Recent Labs   Lab 11/27/19  0319 11/29/19  0501 12/02/19  0523   * 161* 129*     CMP:  Recent Labs   Lab 11/27/19  0319  11/29/19  0501  12/01/19  0321 12/02/19  0523 12/03/19  0403   *   < > 127*   < > 129* 114* 142*   CALCIUM 9.6   < > 9.6   < > 9.6 9.2 9.1   ALBUMIN 3.4*  --  3.3*  --   --  3.3*  --    PROT 6.9  --  7.0  --   --  6.7  --       < > 141   < > 140 138 136   K 4.2    < > 4.6   < > 4.1 5.1 4.5   CO2 29   < > 25   < > 29 24 27      < > 104   < > 101 102 100   BUN 29*   < > 28*   < > 31* 30* 32*   CREATININE 1.7*   < > 1.7*   < > 2.0* 1.7* 2.1*   ALKPHOS 94  --  96  --   --  92  --    ALT 16  --  16  --   --  14  --    AST 21  --  23  --   --  19  --    BILITOT 0.3  --  0.2  --   --  0.3  --     < > = values in this interval not displayed.      Coagulation:   Recent Labs   Lab 12/01/19  0321 12/02/19  0523 12/03/19  0403   INR 3.5* 3.9* 3.6*   APTT 37.4* 39.3* 40.5*     LDH:  Recent Labs   Lab 12/01/19  0321 12/02/19  0523 12/03/19  0403    234 236     Microbiology:  Microbiology Results (last 7 days)     ** No results found for the last 168 hours. **          I have reviewed all pertinent labs within the past 24 hours.    Estimated Creatinine Clearance: 39.8 mL/min (A) (based on SCr of 2.1 mg/dL (H)).    Diagnostic Results:  I have reviewed and interpreted all pertinent imaging results/findings within the past 24 hours.    Assessment and Plan:     50 yo WF with NICM, s/p HM3 implantation 9/10/19 (post up coarse uncomplicated with the exception of+ diaphragmatic stimulation of LV lead and pleural effusion with chest tube placement, atrial flutter with NADINE/DCCV), V-fib reported in setting of hypokalemia with appropriate shock from ICD on Amiodarone prior to LVAD placement; and recent hospitalizations for ventricular arrythmias; started on Mexilitine.  She was seen in EP clinic today and she continues to have multiple VT episodes with some ATP therapy, although no ICD shocks since starting mexiletine 10/24/2019. One slow VT episode 2.5hrs 11/3/2019. Patient asymptomatic with LVAD. On coumadin. No AFL since post-op event (paced out of rhythm 9/24/2019). CHB with 100% V pacing (LV lead off). She does not feel well. Dry heaving with mexiletine. Taking phenergan PRN. 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   She was seen in [facility] clinic  and reported 4 low flow alarms the last 4 nights.  She reports they occur in her sleep and last for only a few seconds.  By the time she wakes up; they quickly stop.  She does report to possibly being little volume overloaded.  She hasn't noticed weight gain at home but thinks she may have little extra fluid.  She denies SOB, chest pain, palpitations, LEGER. In review of her records: she was 223lbs on 10/24/19 during previous hospitalization and is 235lbs now.  Her lasix had previously been decreased to prn.     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    Essential hypertension  -Patient is non-pulsatile  -Doppler goal 60-90 mmHg, currently controlled  -Antihypertensive medications include: Lisinopril, aldactone   - consider switching to different agents due to renal function?      Ventricular tachycardia  - Per EP office visit on day of admission: She continues to have multiple VT episodes with some ATP therapy, although no ICD shocks since starting mexiletine 10/24/2019. One slow VT episode 2.5hrs 11/3/2019. Patient asymptomatic with LVAD. On coumadin. No AFL since post-op event (paced out of rhythm 9/24/2019). CHB with 100% V pacing (LV lead off).   - Plan was to continue current regimen despite Mexiletine making her nauseous.  - Decreased Amiodarone to daily per EP plan on discharge last hospitalization   - Shocked x 3 11/25 for MMVT (in addition has had numerous successful ATP's for  MMVT)   - S/P amio load   - now on Amio 400 BID and mexiletine increased to 200    LVAD (left ventricular assist device) present  - HeartMate 3 Implanted 9/10/19 as DT.  - Implanted by Dr. Walden  - INR  3.6 today. Likely from Amio. Dose coumadin per Pharmacist.  - Antiplatelets:  mg.  - LDH is stable Will monitor daily.   - Speed set at 5100  - Epi @ .02, NO @ 10.   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 Urgent selection 11/26, approved for transplant. Awaiting listing (insurance approval).    Procedure: Device Interrogation Including analysis of device parameters  Current Settings: Ventricular Assist Device  Review of device function is stable    TXP LVAD INTERROGATIONS 12/3/2019 12/3/2019 12/3/2019 12/3/2019 12/3/2019 12/3/2019 12/3/2019   Type HeartMate3 HeartMate3 HeartMate3 HeartMate3 HeartMate3 HeartMate3 HeartMate3   Flow 4.1 4.2 3.9 3.4 3.6 4.2 4   Speed 5100 5100 5100 5100 5100 5100 5100   PI 3.8 2.9 4.6 6.1 4.5 3 3.3   Power (Orellana) 3.5 3.6 3.5 3.5 3.6 3.5 3.6   LSL 4700 4700 4700 4700 4700 4700 4700   Pulsatility Intermittent pulse Intermittent pulse Intermittent pulse Intermittent pulse Intermittent pulse Intermittent pulse Intermittent pulse       CKD (chronic kidney disease)  - Baseline creat appears 1.3-1.6. 2.1 today.  - Will monitor with diuresis     Acute on chronic combined systolic and diastolic heart failure  -NICM  -Last 2D Echo 11/27/19. Severe TR, IVC 15, mild MR, AV does not open, LVedd 5.4cm @ 5200. Image 72,74, cannot exclude thrombus attached to lead in right atrium.  - on lasix 80 PO QD   - EPI .02, NO @ 10 and SvO2 64. CO/CI 6.2/2.8,    - CVP 14 this AM    - continue PO lasix 80 QD and dose lasix 80 IV x1, monitor UOP  - GDMT: Continue ACE 10 BID, aldactone 12.5   - was on lasix 20 PRN at home PTA  -2g Na dietary restriction, 1500 mL fluid restriction, strict I/Os      ICD (implantable cardioverter-defibrillator) in place  - [employer] ICD  - Shocked x 3  11/25 for MMVT   - Loaded with IV amio on 11/25. Now on amio 400 PO BID and mexiletine increased to 200 QD   - See above VT        Julieth Ramos PA-C  Heart Transplant  [facility]    Uninterrupted Critical Care/Counseling Time (not including procedures): 45 minutes

## 2019-12-03 NOTE — PLAN OF CARE
CMICU DAILY GOALS       A: Awake    RASS: Goal - RASS Goal: 0-->alert and calm  Actual - RASS (Patel Agitation-Sedation Scale): 0-->alert and calm   Restraint necessity:    B: Breath   SBT: Not intubated   C: Coordinate A & B, analgesics/sedatives   Pain: managed    SAT: Not intubated  D: Delirium   CAM-ICU: Overall CAM-ICU: Negative  E: Early Mobility   MOVE Screen: Pass   Activity: Activity Management: activity adjusted per tolerance  FAS: Feeding/Nutrition   Diet order: Diet/Nutrition Received: low saturated fat/low cholesterol, 2 gram sodium,   Fluid restriction: Fluid Requirement: 1500cc FR  T: Thrombus   DVT prophylaxis: VTE Required Core Measure: Pharmacological prophylaxis initiated/maintained  H: HOB Elevation   Head of Bed (HOB): HOB at 30 degrees  U: Ulcer Prophylaxis   GI: yes  G: Glucose control   managed    S: Skin   Bundle compliance: yes   Bathing/Skin Care: shampoo Date: 12/3/2019    B: Bowel Function   no issues   I: Indwelling Catheters   Fonseca necessity:     CVC necessity: Yes   IPAD offered: Yes  D: De-escalation Antibx   No  Plan for the day   Continue to monitor cardiac and renal function. Awaiting insurance approval for heart transplant. Central line exchange tomorrow.   Family/Goals of care/Code Status   Code Status: Prior     No acute events throughout day, VS and assessment per flow sheet, patient progressing towards goals as tolerated, plan of care reviewed with Deborah Navas and family, all concerns addressed, will continue to monitor.

## 2019-12-03 NOTE — ASSESSMENT & PLAN NOTE
-NICM  -Last 2D Echo 11/27/19. Severe TR, IVC 15, mild MR, AV does not open, LVedd 5.4cm @ 5200. Image 72,74, cannot exclude thrombus attached to lead in right atrium.  - on lasix 80 PO QD   - EPI .02, NO @ 10 and SvO2 64. CO/CI 6.2/2.8,    - CVP 14 this AM    - continue PO lasix 80 QD and dose lasix 80 IV x1, monitor UOP  - GDMT: Continue ACE 10 BID, aldactone 12.5   - was on lasix 20 PRN at home PTA  -2g Na dietary restriction, 1500 mL fluid restriction, strict I/Os

## 2019-12-03 NOTE — CARE UPDATE
Care Update for Ms. Navas     Objective:   Vitals:    12/02/19 2115   BP: 80/0   Pulse: 85   Resp: (!) 22   Temp: 98.1     Hemodynamics:   Parameter  12/2 at 0500 12/2 at 2100   CVP 10 12   SvO2 65 64    mL/6-8 hours (Bedban) 300 mL since 1700   CI  2.14 2.07   CO 4.8 4.64    914     Current Heart Failure Medications:  - Epinephrine 0.02 mcg/kg/min  - Lasix 80 mg daily last dose at 0900    Mechanical Circulatory Support:  VAD HM3    Assessment/Plan:  - Continue current therapy with no change, if CVP continue to be > 12, will provide 1 extra dose of IV lasix   - Repeat above hemodynamics in morning   - Plan was discussed with attending staff     Yeyo Young MD  Cardiology Fellow ([id number])  Pager: [phone]

## 2019-12-03 NOTE — PROGRESS NOTES
CVP 10 on 1100 assessment. Julieth Ramos with HTS notified of updated CVP prior to administering 80mg lasix IVP ordered for 1115 - clarifying order to proceed with administering lasix dose despite new CVP value. New orders to draw STAT BMP, hold lasix until BMP results to assess renal function. Pt's VSS. WCTM.

## 2019-12-03 NOTE — PLAN OF CARE
CMICU DAILY GOALS       A: Awake    RASS: Goal - RASS Goal: 0-->alert and calm  Actual - RASS (Patel Agitation-Sedation Scale): 0-->alert and calm   Restraint necessity:    B: Breath   SBT: NA   C: Coordinate A & B, analgesics/sedatives   Pain: managed    SAT: NA  D: Delirium   CAM-ICU: Overall CAM-ICU: Negative  E: Early Mobility   MOVE Screen: Pass   Activity: Activity Management: activity adjusted per tolerance  FAS: Feeding/Nutrition   Diet order: Diet/Nutrition Received: low saturated fat/low cholesterol, restrict fluids,   Fluid restriction: Fluid Requirement: 1500 cc FR  T: Thrombus   DVT prophylaxis: VTE Required Core Measure: Pharmacological prophylaxis initiated/maintained  H: HOB Elevation   Head of Bed (HOB): HOB at 15 degrees  U: Ulcer Prophylaxis   GI: yes  G: Glucose control   managed    S: Skin   Bundle compliance: yes   Bathing/Skin Care: shampoo Date: CHG bath 12/2 PM shift  B: Bowel Function   no issues   I: Indwelling Catheters   Fonseca necessity:  no   CVC necessity: Yes   IPAD offered: Yes  D: De-escalation Antibx   No  Plan for the day   Wean epic and nitric as tolerated.  Family/Goals of care/Code Status   Code Status: Full Code     No acute events throughout day, VS and assessment per flow sheet, patient progressing towards goals as tolerated, plan of care reviewed with Deborah Navas and family, all concerns addressed, will continue to monitor.

## 2019-12-03 NOTE — PROGRESS NOTES
12/03/2019  Trevin Sow    Current provider:  Vi Bryant MD      I, Trevin Sow, rounded on Deborah Navas to ensure all mechanical assist device settings (IABP or VAD) were appropriate and all parameters were within limits.  I was able to ensure all back up equipment was present, the staff had no issues, and the Perfusion Department daily rounding was complete.    9:50 AM

## 2019-12-03 NOTE — SUBJECTIVE & OBJECTIVE
Interval History: No events overnight. INR 3.6 this morning. CVP 13 and Svo2 64 this morning. Line exchange tomorrow once INR drops. Awaiting financial clearance for listing for OHTx. C/o shaking this AM (tremors from EPI) and some heartburn.    Continuous Infusions:   sodium chloride 0.9%      epinephrine 0.02 mcg/kg/min (12/03/19 1000)    nitric oxide gas       Scheduled Meds:   amiodarone  400 mg Oral BID    aspirin  325 mg Oral Daily    furosemide  80 mg Intravenous Once    furosemide  80 mg Oral Daily    gabapentin  200 mg Oral BID    GI cocktail (mylanta 30 mL, lidocaine 2 % viscous 10 mL, dicyclomine 10 mL) 50 mL   Oral Once    hepatitis A virus vaccine (PF)  1,440 Units Intramuscular Once    lisinopril  10 mg Oral Daily    magnesium oxide  400 mg Oral Daily    mexiletine  200 mg Oral Q8H    mirtazapine  15 mg Oral QHS    pantoprazole  40 mg Oral BID    polyethylene glycol  17 g Oral Daily    senna-docusate 8.6-50 mg  1 tablet Oral BID    spironolactone  12.5 mg Oral Daily    sucralfate  1 g Oral QID (AC & HS)    venlafaxine  150 mg Oral Daily     PRN Meds:sodium chloride 0.9%, ALPRAZolam, pneumoc 13-amber conj-dip cr(PF), promethazine, senna-docusate 8.6-50 mg, zolpidem    Review of patient's allergies indicates:   Allergen Reactions    Adhesive Blisters     Reaction to area in chest and up only    Codeine Itching     Objective:     Vital Signs (Most Recent):  Temp: 98 °F (36.7 °C) (12/03/19 0701)  Pulse: 84 (12/03/19 1000)  Resp: (!) 25 (12/03/19 1000)  BP: (!) 98/0 (12/03/19 0701)  SpO2: 99 % (12/03/19 0701) Vital Signs (24h Range):  Temp:  [97.7 °F (36.5 °C)-98.3 °F (36.8 °C)] 98 °F (36.7 °C)  Pulse:  [] 84  Resp:  [17-37] 25  SpO2:  [94 %-99 %] 99 %  BP: (64-98)/(0) 98/0     Patient Vitals for the past 72 hrs (Last 3 readings):   Weight   12/02/19 2300 104.2 kg (229 lb 11.5 oz)   12/02/19 1000 105.6 kg (232 lb 12.9 oz)     Body mass index is 35.98 kg/m².      Intake/Output  Summary (Last 24 hours) at 12/3/2019 1057  Last data filed at 12/3/2019 1000  Gross per 24 hour   Intake 1840.3 ml   Output 2300 ml   Net -459.7 ml       Hemodynamic Parameters:     Telemetry: Paced     Physical Exam    Constitutional: She is oriented to person, place, and time. She appears well-developed and well-nourished. Mild tremors in UEs bilaterally  HENT:   Mouth/Throat: Oropharynx is clear and moist.   Eyes: EOM are normal.   Neck: JVD mid neck present.   Cardiovascular: Normal rate, regular rhythm and intact distal pulses.   Smooth VAD hum   Pulmonary/Chest: Effort normal and breath sounds normal. No respiratory distress. She has no rales.   Abdominal: Soft. Bowel sounds are normal. She exhibits no distension. There is no tenderness. There is no guarding.   Musculoskeletal: She exhibits no edema.   Neurological: She is alert and oriented to person, place, and time.   Skin: Skin is warm.    Significant Labs:  CBC:  Recent Labs   Lab 12/01/19  0321 12/02/19  0523 12/03/19  0403   WBC 5.12 5.01 6.41   RBC 4.47 4.44 4.14   HGB 10.6* 10.8* 10.3*   HCT 38.0 37.9 35.1*    307 322   MCV 85 85 85   MCH 23.7* 24.3* 24.9*   MCHC 27.9* 28.5* 29.3*     BNP:  Recent Labs   Lab 11/27/19  0319 11/29/19  0501 12/02/19  0523   * 161* 129*     CMP:  Recent Labs   Lab 11/27/19  0319  11/29/19  0501  12/01/19  0321 12/02/19  0523 12/03/19  0403   *   < > 127*   < > 129* 114* 142*   CALCIUM 9.6   < > 9.6   < > 9.6 9.2 9.1   ALBUMIN 3.4*  --  3.3*  --   --  3.3*  --    PROT 6.9  --  7.0  --   --  6.7  --       < > 141   < > 140 138 136   K 4.2   < > 4.6   < > 4.1 5.1 4.5   CO2 29   < > 25   < > 29 24 27      < > 104   < > 101 102 100   BUN 29*   < > 28*   < > 31* 30* 32*   CREATININE 1.7*   < > 1.7*   < > 2.0* 1.7* 2.1*   ALKPHOS 94  --  96  --   --  92  --    ALT 16  --  16  --   --  14  --    AST 21  --  23  --   --  19  --    BILITOT 0.3  --  0.2  --   --  0.3  --     < > = values in this  interval not displayed.      Coagulation:   Recent Labs   Lab 12/01/19  0321 12/02/19  0523 12/03/19  0403   INR 3.5* 3.9* 3.6*   APTT 37.4* 39.3* 40.5*     LDH:  Recent Labs   Lab 12/01/19 0321 12/02/19  0523 12/03/19  0403    234 236     Microbiology:  Microbiology Results (last 7 days)     ** No results found for the last 168 hours. **          I have reviewed all pertinent labs within the past 24 hours.    Estimated Creatinine Clearance: 39.8 mL/min (A) (based on SCr of 2.1 mg/dL (H)).    Diagnostic Results:  I have reviewed and interpreted all pertinent imaging results/findings within the past 24 hours.

## 2019-12-03 NOTE — ASSESSMENT & PLAN NOTE
-Patient is non-pulsatile  -Doppler goal 60-90 mmHg, currently controlled  -Antihypertensive medications include: Lisinopril, aldactone   - consider switching to different agents due to renal function?

## 2019-12-03 NOTE — NURSING
Pt lying in bed, no family at bedside. Pt hopeful to get OOB with PT/OT today. Pt experiencing drop in flows and low speed when sitting up; no alarms triggered. VAD parameters now WNL.

## 2019-12-04 LAB
ALBUMIN SERPL BCP-MCNC: 3.3 G/DL (ref 3.5–5.2)
ALLENS TEST: ABNORMAL
ALP SERPL-CCNC: 85 U/L (ref 55–135)
ALT SERPL W/O P-5'-P-CCNC: 12 U/L (ref 10–44)
ANION GAP SERPL CALC-SCNC: 12 MMOL/L (ref 8–16)
APTT BLDCRRT: 38 SEC (ref 21–32)
AST SERPL-CCNC: 16 U/L (ref 10–40)
BASOPHILS # BLD AUTO: 0.06 K/UL (ref 0–0.2)
BASOPHILS NFR BLD: 1.1 % (ref 0–1.9)
BILIRUB DIRECT SERPL-MCNC: 0.2 MG/DL (ref 0.1–0.3)
BILIRUB SERPL-MCNC: 0.4 MG/DL (ref 0.1–1)
BNP SERPL-MCNC: 298 PG/ML (ref 0–99)
BSA FOR ECHO PROCEDURE: 2.22 M2
BUN SERPL-MCNC: 33 MG/DL (ref 6–20)
CALCIUM SERPL-MCNC: 9.2 MG/DL (ref 8.7–10.5)
CHLORIDE SERPL-SCNC: 101 MMOL/L (ref 95–110)
CO2 SERPL-SCNC: 27 MMOL/L (ref 23–29)
CREAT SERPL-MCNC: 2 MG/DL (ref 0.5–1.4)
CRP SERPL-MCNC: 10.4 MG/L (ref 0–8.2)
CV ECHO LV RWT: 0.31 CM
DELSYS: ABNORMAL
DIFFERENTIAL METHOD: ABNORMAL
DOP CALC LVOT AREA: 3.8 CM2
DOP CALC LVOT DIAMETER: 2.21 CM
ECHO LV POSTERIOR WALL: 0.86 CM (ref 0.6–1.1)
EOSINOPHIL # BLD AUTO: 0.1 K/UL (ref 0–0.5)
EOSINOPHIL NFR BLD: 2.6 % (ref 0–8)
ERYTHROCYTE [DISTWIDTH] IN BLOOD BY AUTOMATED COUNT: 16.6 % (ref 11.5–14.5)
EST. GFR  (AFRICAN AMERICAN): 32.8 ML/MIN/1.73 M^2
EST. GFR  (NON AFRICAN AMERICAN): 28.5 ML/MIN/1.73 M^2
FRACTIONAL SHORTENING: 2 % (ref 28–44)
GLUCOSE SERPL-MCNC: 128 MG/DL (ref 70–110)
HCO3 UR-SCNC: 32.3 MMOL/L (ref 24–28)
HCT VFR BLD AUTO: 34.8 % (ref 37–48.5)
HGB BLD-MCNC: 10 G/DL (ref 12–16)
IMM GRANULOCYTES # BLD AUTO: 0.01 K/UL (ref 0–0.04)
IMM GRANULOCYTES NFR BLD AUTO: 0.2 % (ref 0–0.5)
INR PPP: 2.5 (ref 0.8–1.2)
INTERVENTRICULAR SEPTUM: 0.59 CM (ref 0.6–1.1)
LA MAJOR: 2.74 CM
LEFT ATRIUM SIZE: 2.67 CM
LEFT INTERNAL DIMENSION IN SYSTOLE: 5.41 CM (ref 2.1–4)
LEFT VENTRICLE DIASTOLIC VOLUME INDEX: 70.93 ML/M2
LEFT VENTRICLE DIASTOLIC VOLUME: 151.93 ML
LEFT VENTRICLE MASS INDEX: 66 G/M2
LEFT VENTRICLE SYSTOLIC VOLUME INDEX: 66.3 ML/M2
LEFT VENTRICLE SYSTOLIC VOLUME: 141.95 ML
LEFT VENTRICULAR INTERNAL DIMENSION IN DIASTOLE: 5.5 CM (ref 3.5–6)
LEFT VENTRICULAR MASS: 141.48 G
LYMPHOCYTES # BLD AUTO: 1.1 K/UL (ref 1–4.8)
LYMPHOCYTES NFR BLD: 19.7 % (ref 18–48)
MAGNESIUM SERPL-MCNC: 2.4 MG/DL (ref 1.6–2.6)
MCH RBC QN AUTO: 24.4 PG (ref 27–31)
MCHC RBC AUTO-ENTMCNC: 28.7 G/DL (ref 32–36)
MCV RBC AUTO: 85 FL (ref 82–98)
METHEMOGLOBIN: 0.3 % (ref 0–3)
MONOCYTES # BLD AUTO: 0.4 K/UL (ref 0.3–1)
MONOCYTES NFR BLD: 8.1 % (ref 4–15)
MV PEAK E VEL: 1.51 M/S
NEUTROPHILS # BLD AUTO: 3.7 K/UL (ref 1.8–7.7)
NEUTROPHILS NFR BLD: 68.3 % (ref 38–73)
NRBC BLD-RTO: 0 /100 WBC
PCO2 BLDA: 56.1 MMHG (ref 35–45)
PH SMN: 7.37 [PH] (ref 7.35–7.45)
PHOSPHATE SERPL-MCNC: 4.7 MG/DL (ref 2.7–4.5)
PISA TR MAX VEL: 1.7 M/S
PLATELET # BLD AUTO: 305 K/UL (ref 150–350)
PMV BLD AUTO: 10.2 FL (ref 9.2–12.9)
PO2 BLDA: 35 MMHG (ref 40–60)
POC BE: 7 MMOL/L
POC SATURATED O2: 64 % (ref 95–100)
POC TCO2: 34 MMOL/L (ref 24–29)
POTASSIUM SERPL-SCNC: 4.4 MMOL/L (ref 3.5–5.1)
PREALB SERPL-MCNC: 29 MG/DL (ref 20–43)
PROT SERPL-MCNC: 6.6 G/DL (ref 6–8.4)
PROTHROMBIN TIME: 24.3 SEC (ref 9–12.5)
RA MAJOR: 3.36 CM
RA PRESSURE: 8 MMHG
RBC # BLD AUTO: 4.09 M/UL (ref 4–5.4)
RIGHT VENTRICULAR END-DIASTOLIC DIMENSION: 4.8 CM
SAMPLE: ABNORMAL
SINUS: 3.02 CM
SITE: ABNORMAL
SODIUM SERPL-SCNC: 140 MMOL/L (ref 136–145)
TR MAX PG: 12 MMHG
TRICUSPID ANNULAR PLANE SYSTOLIC EXCURSION: 1.38 CM
TV REST PULMONARY ARTERY PRESSURE: 20 MMHG
WBC # BLD AUTO: 5.34 K/UL (ref 3.9–12.7)

## 2019-12-04 PROCEDURE — C1751 CATH, INF, PER/CENT/MIDLINE: HCPCS | Mod: NTX

## 2019-12-04 PROCEDURE — 99291 PR CRITICAL CARE, E/M 30-74 MINUTES: ICD-10-PCS | Mod: NTX,,, | Performed by: PHYSICIAN ASSISTANT

## 2019-12-04 PROCEDURE — 85730 THROMBOPLASTIN TIME PARTIAL: CPT | Mod: NTX

## 2019-12-04 PROCEDURE — 82803 BLOOD GASES ANY COMBINATION: CPT | Mod: NTX

## 2019-12-04 PROCEDURE — 86140 C-REACTIVE PROTEIN: CPT | Mod: NTX

## 2019-12-04 PROCEDURE — 63600175 PHARM REV CODE 636 W HCPCS: Mod: NTX | Performed by: PHYSICIAN ASSISTANT

## 2019-12-04 PROCEDURE — 27000248 HC VAD-ADDITIONAL DAY: Mod: NTX

## 2019-12-04 PROCEDURE — 63600367 HC NITRIC OXIDE PER HOUR: Mod: NTX

## 2019-12-04 PROCEDURE — 25000003 PHARM REV CODE 250: Mod: NTX | Performed by: PHYSICIAN ASSISTANT

## 2019-12-04 PROCEDURE — 76937 US GUIDE VASCULAR ACCESS: CPT | Mod: NTX

## 2019-12-04 PROCEDURE — 99900035 HC TECH TIME PER 15 MIN (STAT): Mod: NTX

## 2019-12-04 PROCEDURE — 20000000 HC ICU ROOM: Mod: NTX

## 2019-12-04 PROCEDURE — 84100 ASSAY OF PHOSPHORUS: CPT | Mod: NTX

## 2019-12-04 PROCEDURE — 83880 ASSAY OF NATRIURETIC PEPTIDE: CPT | Mod: NTX

## 2019-12-04 PROCEDURE — 36410 VNPNXR 3YR/> PHY/QHP DX/THER: CPT | Mod: NTX

## 2019-12-04 PROCEDURE — 80076 HEPATIC FUNCTION PANEL: CPT | Mod: NTX

## 2019-12-04 PROCEDURE — 94799 UNLISTED PULMONARY SVC/PX: CPT | Mod: NTX

## 2019-12-04 PROCEDURE — 85610 PROTHROMBIN TIME: CPT | Mod: NTX

## 2019-12-04 PROCEDURE — 93463 DRUG ADMIN & HEMODYNMIC MEAS: CPT | Mod: NTX

## 2019-12-04 PROCEDURE — 93750 PR INTERROGATE VENT ASSIST DEV, IN PERSON, W PHYSICIAN ANALYSIS: ICD-10-PCS | Mod: NTX,,, | Performed by: INTERNAL MEDICINE

## 2019-12-04 PROCEDURE — 83735 ASSAY OF MAGNESIUM: CPT | Mod: NTX

## 2019-12-04 PROCEDURE — 85025 COMPLETE CBC W/AUTO DIFF WBC: CPT | Mod: NTX

## 2019-12-04 PROCEDURE — 80048 BASIC METABOLIC PNL TOTAL CA: CPT | Mod: NTX

## 2019-12-04 PROCEDURE — 27000221 HC OXYGEN, UP TO 24 HOURS: Mod: NTX

## 2019-12-04 PROCEDURE — 84134 ASSAY OF PREALBUMIN: CPT | Mod: NTX

## 2019-12-04 PROCEDURE — 25000003 PHARM REV CODE 250: Mod: NTX | Performed by: INTERNAL MEDICINE

## 2019-12-04 PROCEDURE — 93750 INTERROGATION VAD IN PERSON: CPT | Mod: NTX,,, | Performed by: INTERNAL MEDICINE

## 2019-12-04 PROCEDURE — 99291 CRITICAL CARE FIRST HOUR: CPT | Mod: NTX,,, | Performed by: PHYSICIAN ASSISTANT

## 2019-12-04 RX ORDER — FUROSEMIDE 10 MG/ML
80 INJECTION INTRAMUSCULAR; INTRAVENOUS ONCE
Status: COMPLETED | OUTPATIENT
Start: 2019-12-04 | End: 2019-12-04

## 2019-12-04 RX ORDER — WARFARIN 1 MG/1
1 TABLET ORAL DAILY
Status: DISCONTINUED | OUTPATIENT
Start: 2019-12-04 | End: 2019-12-05

## 2019-12-04 RX ADMIN — POLYETHYLENE GLYCOL 3350 17 G: 17 POWDER, FOR SOLUTION ORAL at 08:12

## 2019-12-04 RX ADMIN — MEXILETINE HYDROCHLORIDE 200 MG: 200 CAPSULE ORAL at 06:12

## 2019-12-04 RX ADMIN — ASPIRIN 325 MG: 325 TABLET, DELAYED RELEASE ORAL at 08:12

## 2019-12-04 RX ADMIN — MIRTAZAPINE 15 MG: 15 TABLET, FILM COATED ORAL at 10:12

## 2019-12-04 RX ADMIN — MAGNESIUM OXIDE TAB 400 MG (241.3 MG ELEMENTAL MG) 400 MG: 400 (241.3 MG) TAB at 08:12

## 2019-12-04 RX ADMIN — ALPRAZOLAM 0.25 MG: 0.25 TABLET ORAL at 03:12

## 2019-12-04 RX ADMIN — SPIRONOLACTONE 12.5 MG: 25 TABLET, FILM COATED ORAL at 08:12

## 2019-12-04 RX ADMIN — WARFARIN SODIUM 1 MG: 1 TABLET ORAL at 05:12

## 2019-12-04 RX ADMIN — MEXILETINE HYDROCHLORIDE 200 MG: 200 CAPSULE ORAL at 10:12

## 2019-12-04 RX ADMIN — PANTOPRAZOLE SODIUM 40 MG: 40 TABLET, DELAYED RELEASE ORAL at 06:12

## 2019-12-04 RX ADMIN — GABAPENTIN 200 MG: 100 CAPSULE ORAL at 10:12

## 2019-12-04 RX ADMIN — VENLAFAXINE 150 MG: 37.5 TABLET ORAL at 08:12

## 2019-12-04 RX ADMIN — MEXILETINE HYDROCHLORIDE 200 MG: 200 CAPSULE ORAL at 03:12

## 2019-12-04 RX ADMIN — GABAPENTIN 200 MG: 100 CAPSULE ORAL at 08:12

## 2019-12-04 RX ADMIN — ZOLPIDEM TARTRATE 5 MG: 5 TABLET ORAL at 10:12

## 2019-12-04 RX ADMIN — AMIODARONE HYDROCHLORIDE 400 MG: 200 TABLET ORAL at 08:12

## 2019-12-04 RX ADMIN — SENNOSIDES AND DOCUSATE SODIUM 1 TABLET: 8.6; 5 TABLET ORAL at 10:12

## 2019-12-04 RX ADMIN — PANTOPRAZOLE SODIUM 40 MG: 40 TABLET, DELAYED RELEASE ORAL at 03:12

## 2019-12-04 RX ADMIN — FUROSEMIDE 80 MG: 40 TABLET ORAL at 08:12

## 2019-12-04 RX ADMIN — FUROSEMIDE 80 MG: 10 INJECTION, SOLUTION INTRAMUSCULAR; INTRAVENOUS at 02:12

## 2019-12-04 RX ADMIN — AMIODARONE HYDROCHLORIDE 400 MG: 200 TABLET ORAL at 10:12

## 2019-12-04 RX ADMIN — SUCRALFATE 1 G: 1 SUSPENSION ORAL at 06:12

## 2019-12-04 RX ADMIN — SUCRALFATE 1 G: 1 SUSPENSION ORAL at 11:12

## 2019-12-04 RX ADMIN — SUCRALFATE 1 G: 1 SUSPENSION ORAL at 03:12

## 2019-12-04 RX ADMIN — WHITE PETROLATUM: 1.75 OINTMENT TOPICAL at 11:12

## 2019-12-04 RX ADMIN — SENNOSIDES AND DOCUSATE SODIUM 1 TABLET: 8.6; 5 TABLET ORAL at 08:12

## 2019-12-04 NOTE — PROGRESS NOTES
ZAC Ramos re-iterated importance and urgency to remove pt's R IJ central line due to line being 13 days old to date. Since a PICC line cannot be placed and only midline can be placed, I wanted to confirm that the team is comfortable without central access to monitor svo2 and CVPs until new central line can be placed tomorrow in cath lab. ZAC Ramos confirmed that the team is ok without central access and CVP/svo2 monitoring until then. R IJ to be pulled once midline is placed. Pt's VSS, no acute changes in pt status. WCTM.

## 2019-12-04 NOTE — ASSESSMENT & PLAN NOTE
- HeartMate 3 Implanted 9/10/19 as DT.  - Implanted by Dr. Walden  - INR  2.5 today. Likely from Amio. Dose coumadin per Pharmacist.  - Antiplatelets:  mg.  - LDH is stable Will monitor daily.   - Speed set at 5100  - Epi @ .02, NO @ 10.   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 Urgent selection 11/26, approved for transplant. Awaiting listing (insurance approval).    Procedure: Device Interrogation Including analysis of device parameters  Current Settings: Ventricular Assist Device  Review of device function is stable    TXP LVAD INTERROGATIONS 12/4/2019 12/4/2019 12/4/2019 12/4/2019 12/4/2019 12/4/2019 12/4/2019   Type HeartMate3 HeartMate3 HeartMate3 HeartMate3 - - -   Flow 3.8 3.8 3.9 4.5 4.1 4.0 4.3   Speed 5100 5100 5100 5100 5100 5150 5100   PI 4.3 4.0 3.7 2.6 3.7 3.6 4.2   Power (Orellana) 3.4 3.5 3.5 3.5 3.5 3.5 3.6   LSL 4700 4700 4700 4700 - - -   Pulsatility Intermittent pulse Intermittent pulse Intermittent pulse Intermittent pulse - - -

## 2019-12-04 NOTE — PROGRESS NOTES
[facility]  Heart Transplant  Progress Note    Patient Name: Deborah Navas  MRN: [id number]  Admission Date: 11/13/2019  Hospital Length of Stay: 19 days  Attending Physician: Vi Bryant MD  Primary Care Provider: Primary Doctor No  Principal Problem:Left ventricular assist device (LVAD) complication    Subjective:     Interval History: No complaints, no events overnight. Feels generally well. Denies NVD, SOB, Chest pain.  Shaking improved. GERD also improved. Plan for RHC with position changes tomorrow.     Continuous Infusions:   epinephrine 0.02 mcg/kg/min (12/04/19 1000)    nitric oxide gas       Scheduled Meds:   amiodarone  400 mg Oral BID    aspirin  325 mg Oral Daily    furosemide  80 mg Intravenous Once    furosemide  80 mg Oral Daily    gabapentin  200 mg Oral BID    hepatitis A virus vaccine (PF)  1,440 Units Intramuscular Once    lisinopril  10 mg Oral Daily    magnesium oxide  400 mg Oral Daily    mexiletine  200 mg Oral Q8H    mirtazapine  15 mg Oral QHS    pantoprazole  40 mg Oral BID AC    polyethylene glycol  17 g Oral Daily    senna-docusate 8.6-50 mg  1 tablet Oral BID    spironolactone  12.5 mg Oral Daily    sucralfate  1 g Oral QID (AC & HS)    venlafaxine  150 mg Oral Daily    warfarin  1 mg Oral Daily     PRN Meds:ALPRAZolam, pneumoc 13-amber conj-dip cr(PF), promethazine, sodium chloride, white petrolatum, zolpidem    Review of patient's allergies indicates:   Allergen Reactions    Adhesive Blisters     Reaction to area in chest and up only    Codeine Itching     Objective:     Vital Signs (Most Recent):  Temp: 97.9 °F (36.6 °C) (12/04/19 0701)  Pulse: 84 (12/04/19 1000)  Resp: 20 (12/04/19 1000)  BP: (!) 70/0 (12/04/19 0701)  SpO2: 98 % (12/04/19 0701) Vital Signs (24h Range):  Temp:  [97.6 °F (36.4 °C)-98.1 °F (36.7 °C)] 97.9 °F (36.6 °C)  Pulse:  [73-89] 84  Resp:  [15-37] 20  SpO2:  [98 %-99 %] 98 %  BP: (70-88)/(0) 70/0     Patient  Vitals for the past 72 hrs (Last 3 readings):   Weight   12/04/19 0300 104.1 kg (229 lb 8 oz)   12/03/19 1600 104.7 kg (230 lb 13.2 oz)   12/02/19 2300 104.2 kg (229 lb 11.5 oz)     Body mass index is 35.94 kg/m².      Intake/Output Summary (Last 24 hours) at 12/4/2019 1017  Last data filed at 12/4/2019 1000  Gross per 24 hour   Intake 1284 ml   Output 3810 ml   Net -2526 ml       Hemodynamic Parameters:     Telemetry: Paced     Physical Exam    Constitutional: She is oriented to person, place, and time. She appears well-developed and well-nourished. Mild tremors in UEs bilaterally  HENT:   Mouth/Throat: Oropharynx is clear and moist.   Eyes: EOM are normal.   Neck: JVD mid neck present.   Cardiovascular: Normal rate, regular rhythm and intact distal pulses.   Smooth VAD hum   Pulmonary/Chest: Effort normal and breath sounds normal. No respiratory distress. She has no rales.   Abdominal: Soft. Bowel sounds are normal. She exhibits no distension. There is no tenderness. There is no guarding.   Musculoskeletal: She exhibits no edema.   Neurological: She is alert and oriented to person, place, and time.   Skin: Skin is warm.    Significant Labs:  CBC:  Recent Labs   Lab 12/02/19 0523 12/03/19  0403 12/04/19  0356   WBC 5.01 6.41 5.34   RBC 4.44 4.14 4.09   HGB 10.8* 10.3* 10.0*   HCT 37.9 35.1* 34.8*    322 305   MCV 85 85 85   MCH 24.3* 24.9* 24.4*   MCHC 28.5* 29.3* 28.7*     BNP:  Recent Labs   Lab 11/29/19  0501 12/02/19  0523 12/04/19  0356   * 129* 298*     CMP:  Recent Labs   Lab 11/29/19  0501  12/02/19  0523 12/03/19  0403 12/03/19  1141 12/04/19  0356   *   < > 114* 142* 105 128*   CALCIUM 9.6   < > 9.2 9.1 9.6 9.2   ALBUMIN 3.3*  --  3.3*  --   --  3.3*   PROT 7.0  --  6.7  --   --  6.6      < > 138 136 139 140   K 4.6   < > 5.1 4.5 4.7 4.4   CO2 25   < > 24 27 27 27      < > 102 100 102 101   BUN 28*   < > 30* 32* 30* 33*   CREATININE 1.7*   < > 1.7* 2.1* 2.0* 2.0*    ALKPHOS 96  --  92  --   --  85   ALT 16  --  14  --   --  12   AST 23  --  19  --   --  16   BILITOT 0.2  --  0.3  --   --  0.4    < > = values in this interval not displayed.      Coagulation:   Recent Labs   Lab 12/02/19 0523 12/03/19 0403 12/04/19  0356   INR 3.9* 3.6* 2.5*   APTT 39.3* 40.5* 38.0*     LDH:  Recent Labs   Lab 12/02/19 0523 12/03/19  0403    236     Microbiology:  Microbiology Results (last 7 days)     ** No results found for the last 168 hours. **          I have reviewed all pertinent labs within the past 24 hours.    Estimated Creatinine Clearance: 41.8 mL/min (A) (based on SCr of 2 mg/dL (H)).    Diagnostic Results:  I have reviewed and interpreted all pertinent imaging results/findings within the past 24 hours.    Assessment and Plan:     50 yo WF with NICM, s/p HM3 implantation 9/10/19 (post up coarse uncomplicated with the exception of+ diaphragmatic stimulation of LV lead and pleural effusion with chest tube placement, atrial flutter with NADINE/DCCV), V-fib reported in setting of hypokalemia with appropriate shock from ICD on Amiodarone prior to LVAD placement; and recent hospitalizations for ventricular arrythmias; started on Mexilitine.  She was seen in EP clinic today and she continues to have multiple VT episodes with some ATP therapy, although no ICD shocks since starting mexiletine 10/24/2019. One slow VT episode 2.5hrs 11/3/2019. Patient asymptomatic with LVAD. On coumadin. No AFL since post-op event (paced out of rhythm 9/24/2019). CHB with 100% V pacing (LV lead off). She does not feel well. Dry heaving with mexiletine. Taking phenergan PRN. 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   She was seen in HTS clinic and reported 4 low flow alarms the last 4 nights.  She reports they occur in her sleep and last for only a few seconds.  By the time she wakes up; they quickly stop.  She does report to possibly being little volume overloaded.   She hasn't noticed weight gain at home but thinks she may have little extra fluid.  She denies SOB, chest pain, palpitations, LEGER. In review of her records: she was 223lbs on 10/24/19 during previous hospitalization and is 235lbs now.  Her lasix had previously been decreased to prn.     * Left ventricular assist device (LVAD) complication  - hx low flow alarms prior to admit, last LFA 12/1  - Possibly secondary to ADHF--> RV failure  - Formal report of CT unremarkable; however, it was a non contrast as creat was elevated above baseline on admit and inflow and outflow cannulas not accurately visualized  - plan for RHC tomorrow with position changes    GLO (acute kidney injury)  - see CKD    Organ transplant candidate  - will attempt to list with exemption due to VT with ICD shocks and RV failure (will not tolerate )  - approved for transplant on 11/26, awaiting insurance approval for listing.    Essential hypertension  -Patient is non-pulsatile  -Doppler goal 60-90 mmHg, currently controlled  -Antihypertensive medications include: Lisinopril, aldactone   - consider switching to different agents due to renal function?      Ventricular tachycardia  - Per EP office visit on day of admission: She continues to have multiple VT episodes with some ATP therapy, although no ICD shocks since starting mexiletine 10/24/2019. One slow VT episode 2.5hrs 11/3/2019. Patient asymptomatic with LVAD. On coumadin. No AFL since post-op event (paced out of rhythm 9/24/2019). CHB with 100% V pacing (LV lead off).   - Plan was to continue current regimen despite Mexiletine making her nauseous.  - Decreased Amiodarone to daily per EP plan on discharge last hospitalization   - Shocked x 3 11/25 for MMVT (in addition has had numerous successful ATP's for MMVT)   - S/P amio load   - now on Amio 400 BID and mexiletine increased to 200    LVAD (left ventricular assist device) present  - HeartMate 3 Implanted 9/10/19 as DT.  - Implanted by   [employer]  - INR  2.5 today. Likely from Amio. Dose coumadin per Pharmacist.  - Antiplatelets:  mg.  - LDH is stable Will monitor daily.   - Speed set at 5100  - Epi @ .02, NO @ 10.   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 Urgent selection 11/26, approved for transplant. Awaiting listing (insurance approval).    Procedure: Device Interrogation Including analysis of device parameters  Current Settings: Ventricular Assist Device  Review of device function is stable    TXP LVAD INTERROGATIONS 12/4/2019 12/4/2019 12/4/2019 12/4/2019 12/4/2019 12/4/2019 12/4/2019   Type HeartMate3 HeartMate3 HeartMate3 HeartMate3 - - -   Flow 3.8 3.8 3.9 4.5 4.1 4.0 4.3   Speed 5100 5100 5100 5100 5100 5150 5100   PI 4.3 4.0 3.7 2.6 3.7 3.6 4.2   Power (Orellana) 3.4 3.5 3.5 3.5 3.5 3.5 3.6   LSL 4700 4700 4700 4700 - - -   Pulsatility Intermittent pulse Intermittent pulse Intermittent pulse Intermittent pulse - - -       CKD (chronic kidney disease)  - Baseline creat appears 1.3-1.6. 2.0 today.  - Will monitor with diuresis     Acute on chronic combined systolic and diastolic heart failure  -NICM  -Last 2D Echo 11/27/19. Severe TR, IVC 15, mild MR, AV does not open, LVedd 5.4cm @ 5200. Image 72,74, cannot exclude thrombus attached to lead in right atrium.  -repeat echo today at 5100 and plan for RHC with position changes tomorrow  - on lasix 80 PO QD with PRN IVP (watchign CVP and Creatinine)   - EPI .02, NO @ 10 and SvO2 64. CO/CI 6.3/2.8   - CVP 12 this AM    - continue PO lasix 80 QD and dose lasix 80 IV x1, monitor UOP  - GDMT: Continue ACE 10 BID, aldactone 12.5   - was on lasix 20 PRN at home PTA  -2g Na dietary restriction, 1500 mL fluid restriction, strict I/Os      ICD (implantable cardioverter-defibrillator) in place  - [employer]onic ICD  - Shocked x 3 11/25 for MMVT   - Loaded with IV amio on 11/25. Now on amio 400 PO BID and mexiletine increased to 200 QD   - See above VT        Julieth Ramos PA-C  Heart  Transplant  [facility]    Uninterrupted Critical Care/Counseling Time (not including procedures): 45 minutes

## 2019-12-04 NOTE — ASSESSMENT & PLAN NOTE
-NICM  -Last 2D Echo 11/27/19. Severe TR, IVC 15, mild MR, AV does not open, LVedd 5.4cm @ 5200. Image 72,74, cannot exclude thrombus attached to lead in right atrium.  -repeat echo today at 5100 and plan for RHC with position changes tomorrow  - on lasix 80 PO QD with PRN IVP (watchign CVP and Creatinine)   - EPI .02, NO @ 10 and SvO2 64. CO/CI 6.3/2.8   - CVP 12 this AM    - continue PO lasix 80 QD and dose lasix 80 IV x1, monitor UOP  - GDMT: Continue ACE 10 BID, aldactone 12.5   - was on lasix 20 PRN at home PTA  -2g Na dietary restriction, 1500 mL fluid restriction, strict I/Os

## 2019-12-04 NOTE — PROGRESS NOTES
12/04/2019  Trevin Sow    Current provider:  Vi Bryant MD      I, Trevin Sow, rounded on Deborah Navas to ensure all mechanical assist device settings (IABP or VAD) were appropriate and all parameters were within limits.  I was able to ensure all back up equipment was present, the staff had no issues, and the Perfusion Department daily rounding was complete.    7:58 AM

## 2019-12-04 NOTE — CONSULTS
"Single lumen 18G x 8CM midline placed right cephalic vein. Max dwell date 1/2/20, Lot# [id number] and 20G 1-3/4" placed to RFA.  Needle advanced into the vessel under real time ultrasound guidance.  Image recorded and saved.  "

## 2019-12-04 NOTE — PLAN OF CARE
Pt with no significant events overnight. CVP remains 12-14 and SvO2 this AM 64. No VAD alarms. Plan to continue nitric and epi and only wean as tolerated. POC discussed with pt in full detail with all questions and concerns addressed. KIM.

## 2019-12-04 NOTE — ASSESSMENT & PLAN NOTE
- hx low flow alarms prior to admit, last LFA 12/1  - Possibly secondary to ADHF--> RV failure  - Formal report of CT unremarkable; however, it was a non contrast as creat was elevated above baseline on admit and inflow and outflow cannulas not accurately visualized  - plan for RHC tomorrow with position changes

## 2019-12-05 ENCOUNTER — TELEPHONE (OUTPATIENT)
Dept: ADMINISTRATIVE | Facility: HOSPITAL | Age: 49
End: 2019-12-05

## 2019-12-05 ENCOUNTER — TELEPHONE (OUTPATIENT)
Dept: TRANSPLANT | Facility: CLINIC | Age: 49
End: 2019-12-05

## 2019-12-05 ENCOUNTER — DOCUMENTATION ONLY (OUTPATIENT)
Dept: TRANSPLANT | Facility: CLINIC | Age: 49
End: 2019-12-05

## 2019-12-05 DIAGNOSIS — I50.22 CHRONIC SYSTOLIC CONGESTIVE HEART FAILURE: Primary | ICD-10-CM

## 2019-12-05 DIAGNOSIS — Z76.82 ORGAN TRANSPLANT CANDIDATE: ICD-10-CM

## 2019-12-05 LAB
ALLENS TEST: ABNORMAL
ANION GAP SERPL CALC-SCNC: 14 MMOL/L (ref 8–16)
APTT BLDCRRT: 33.1 SEC (ref 21–32)
BASOPHILS # BLD AUTO: 0.06 K/UL (ref 0–0.2)
BASOPHILS NFR BLD: 1.1 % (ref 0–1.9)
BUN SERPL-MCNC: 28 MG/DL (ref 6–20)
CALCIUM SERPL-MCNC: 9.4 MG/DL (ref 8.7–10.5)
CHLORIDE SERPL-SCNC: 100 MMOL/L (ref 95–110)
CO2 SERPL-SCNC: 26 MMOL/L (ref 23–29)
CREAT SERPL-MCNC: 1.9 MG/DL (ref 0.5–1.4)
DELSYS: ABNORMAL
DIFFERENTIAL METHOD: ABNORMAL
EOSINOPHIL # BLD AUTO: 0.2 K/UL (ref 0–0.5)
EOSINOPHIL NFR BLD: 3.3 % (ref 0–8)
ERYTHROCYTE [DISTWIDTH] IN BLOOD BY AUTOMATED COUNT: 16.7 % (ref 11.5–14.5)
EST. GFR  (AFRICAN AMERICAN): 34.9 ML/MIN/1.73 M^2
EST. GFR  (NON AFRICAN AMERICAN): 30.3 ML/MIN/1.73 M^2
FLOW: 4
GLUCOSE SERPL-MCNC: 132 MG/DL (ref 70–110)
HCO3 UR-SCNC: 32.1 MMOL/L (ref 24–28)
HCT VFR BLD AUTO: 34.8 % (ref 37–48.5)
HGB BLD-MCNC: 9.9 G/DL (ref 12–16)
IMM GRANULOCYTES # BLD AUTO: 0 K/UL (ref 0–0.04)
IMM GRANULOCYTES NFR BLD AUTO: 0 % (ref 0–0.5)
INR PPP: 2.1 (ref 0.8–1.2)
LYMPHOCYTES # BLD AUTO: 1.2 K/UL (ref 1–4.8)
LYMPHOCYTES NFR BLD: 21.7 % (ref 18–48)
MAGNESIUM SERPL-MCNC: 2.4 MG/DL (ref 1.6–2.6)
MCH RBC QN AUTO: 24 PG (ref 27–31)
MCHC RBC AUTO-ENTMCNC: 28.4 G/DL (ref 32–36)
MCV RBC AUTO: 84 FL (ref 82–98)
METHEMOGLOBIN: 0.1 % (ref 0–3)
MODE: ABNORMAL
MONOCYTES # BLD AUTO: 0.5 K/UL (ref 0.3–1)
MONOCYTES NFR BLD: 8.2 % (ref 4–15)
NEUTROPHILS # BLD AUTO: 3.8 K/UL (ref 1.8–7.7)
NEUTROPHILS NFR BLD: 65.7 % (ref 38–73)
NRBC BLD-RTO: 0 /100 WBC
PCO2 BLDA: 50.6 MMHG (ref 35–45)
PH SMN: 7.41 [PH] (ref 7.35–7.45)
PHOSPHATE SERPL-MCNC: 4.3 MG/DL (ref 2.7–4.5)
PLATELET # BLD AUTO: 314 K/UL (ref 150–350)
PMV BLD AUTO: 10.1 FL (ref 9.2–12.9)
PO2 BLDA: 29 MMHG (ref 40–60)
POC BE: 7 MMOL/L
POC SATURATED O2: 54 % (ref 95–100)
POC TCO2: 34 MMOL/L (ref 24–29)
POTASSIUM SERPL-SCNC: 3.8 MMOL/L (ref 3.5–5.1)
PROTHROMBIN TIME: 20.1 SEC (ref 9–12.5)
RBC # BLD AUTO: 4.13 M/UL (ref 4–5.4)
SAMPLE: ABNORMAL
SITE: ABNORMAL
SODIUM SERPL-SCNC: 140 MMOL/L (ref 136–145)
WBC # BLD AUTO: 5.71 K/UL (ref 3.9–12.7)

## 2019-12-05 PROCEDURE — 25000003 PHARM REV CODE 250: Mod: NTX | Performed by: INTERNAL MEDICINE

## 2019-12-05 PROCEDURE — 27000221 HC OXYGEN, UP TO 24 HOURS: Mod: NTX

## 2019-12-05 PROCEDURE — 25000003 PHARM REV CODE 250: Mod: NTX | Performed by: STUDENT IN AN ORGANIZED HEALTH CARE EDUCATION/TRAINING PROGRAM

## 2019-12-05 PROCEDURE — 83735 ASSAY OF MAGNESIUM: CPT | Mod: NTX

## 2019-12-05 PROCEDURE — 25000003 PHARM REV CODE 250: Mod: NTX | Performed by: PHYSICIAN ASSISTANT

## 2019-12-05 PROCEDURE — 85025 COMPLETE CBC W/AUTO DIFF WBC: CPT | Mod: NTX

## 2019-12-05 PROCEDURE — 63600175 PHARM REV CODE 636 W HCPCS: Mod: NTX | Performed by: PHYSICIAN ASSISTANT

## 2019-12-05 PROCEDURE — 84100 ASSAY OF PHOSPHORUS: CPT | Mod: NTX

## 2019-12-05 PROCEDURE — 85730 THROMBOPLASTIN TIME PARTIAL: CPT | Mod: NTX

## 2019-12-05 PROCEDURE — 27000248 HC VAD-ADDITIONAL DAY: Mod: NTX

## 2019-12-05 PROCEDURE — 85610 PROTHROMBIN TIME: CPT | Mod: NTX

## 2019-12-05 PROCEDURE — 80048 BASIC METABOLIC PNL TOTAL CA: CPT | Mod: NTX

## 2019-12-05 PROCEDURE — 36415 COLL VENOUS BLD VENIPUNCTURE: CPT | Mod: NTX

## 2019-12-05 PROCEDURE — 94761 N-INVAS EAR/PLS OXIMETRY MLT: CPT | Mod: NTX

## 2019-12-05 PROCEDURE — 63600367 HC NITRIC OXIDE PER HOUR: Mod: NTX

## 2019-12-05 PROCEDURE — 99900035 HC TECH TIME PER 15 MIN (STAT): Mod: NTX

## 2019-12-05 PROCEDURE — 99291 PR CRITICAL CARE, E/M 30-74 MINUTES: ICD-10-PCS | Mod: NTX,,, | Performed by: PHYSICIAN ASSISTANT

## 2019-12-05 PROCEDURE — 86790 VIRUS ANTIBODY NOS: CPT | Mod: NTX

## 2019-12-05 PROCEDURE — 82803 BLOOD GASES ANY COMBINATION: CPT | Mod: NTX

## 2019-12-05 PROCEDURE — 99291 CRITICAL CARE FIRST HOUR: CPT | Mod: NTX,,, | Performed by: PHYSICIAN ASSISTANT

## 2019-12-05 PROCEDURE — 93750 PR INTERROGATE VENT ASSIST DEV, IN PERSON, W PHYSICIAN ANALYSIS: ICD-10-PCS | Mod: NTX,,, | Performed by: INTERNAL MEDICINE

## 2019-12-05 PROCEDURE — 93750 INTERROGATION VAD IN PERSON: CPT | Mod: NTX,,, | Performed by: INTERNAL MEDICINE

## 2019-12-05 PROCEDURE — 20000000 HC ICU ROOM: Mod: NTX

## 2019-12-05 RX ORDER — FUROSEMIDE 10 MG/ML
80 INJECTION INTRAMUSCULAR; INTRAVENOUS ONCE
Status: COMPLETED | OUTPATIENT
Start: 2019-12-05 | End: 2019-12-05

## 2019-12-05 RX ORDER — POTASSIUM CHLORIDE 20 MEQ/1
20 TABLET, EXTENDED RELEASE ORAL ONCE
Status: COMPLETED | OUTPATIENT
Start: 2019-12-05 | End: 2019-12-05

## 2019-12-05 RX ADMIN — MEXILETINE HYDROCHLORIDE 200 MG: 200 CAPSULE ORAL at 05:12

## 2019-12-05 RX ADMIN — SPIRONOLACTONE 12.5 MG: 25 TABLET, FILM COATED ORAL at 08:12

## 2019-12-05 RX ADMIN — SENNOSIDES AND DOCUSATE SODIUM 1 TABLET: 8.6; 5 TABLET ORAL at 09:12

## 2019-12-05 RX ADMIN — MEXILETINE HYDROCHLORIDE 200 MG: 200 CAPSULE ORAL at 02:12

## 2019-12-05 RX ADMIN — POTASSIUM CHLORIDE 20 MEQ: 1500 TABLET, EXTENDED RELEASE ORAL at 05:12

## 2019-12-05 RX ADMIN — GABAPENTIN 200 MG: 100 CAPSULE ORAL at 09:12

## 2019-12-05 RX ADMIN — SUCRALFATE 1 G: 1 SUSPENSION ORAL at 05:12

## 2019-12-05 RX ADMIN — SENNOSIDES AND DOCUSATE SODIUM 1 TABLET: 8.6; 5 TABLET ORAL at 08:12

## 2019-12-05 RX ADMIN — PANTOPRAZOLE SODIUM 40 MG: 40 TABLET, DELAYED RELEASE ORAL at 05:12

## 2019-12-05 RX ADMIN — FUROSEMIDE 80 MG: 40 TABLET ORAL at 08:12

## 2019-12-05 RX ADMIN — MAGNESIUM OXIDE TAB 400 MG (241.3 MG ELEMENTAL MG) 400 MG: 400 (241.3 MG) TAB at 08:12

## 2019-12-05 RX ADMIN — AMIODARONE HYDROCHLORIDE 400 MG: 200 TABLET ORAL at 08:12

## 2019-12-05 RX ADMIN — ALPRAZOLAM 0.25 MG: 0.25 TABLET ORAL at 12:12

## 2019-12-05 RX ADMIN — SUCRALFATE 1 G: 1 SUSPENSION ORAL at 02:12

## 2019-12-05 RX ADMIN — VENLAFAXINE 150 MG: 37.5 TABLET ORAL at 08:12

## 2019-12-05 RX ADMIN — MIRTAZAPINE 15 MG: 15 TABLET, FILM COATED ORAL at 09:12

## 2019-12-05 RX ADMIN — GABAPENTIN 200 MG: 100 CAPSULE ORAL at 08:12

## 2019-12-05 RX ADMIN — ASPIRIN 325 MG: 325 TABLET, DELAYED RELEASE ORAL at 08:12

## 2019-12-05 RX ADMIN — MEXILETINE HYDROCHLORIDE 200 MG: 200 CAPSULE ORAL at 09:12

## 2019-12-05 RX ADMIN — AMIODARONE HYDROCHLORIDE 400 MG: 200 TABLET ORAL at 09:12

## 2019-12-05 RX ADMIN — ALPRAZOLAM 0.25 MG: 0.25 TABLET ORAL at 09:12

## 2019-12-05 RX ADMIN — FUROSEMIDE 80 MG: 10 INJECTION, SOLUTION INTRAMUSCULAR; INTRAVENOUS at 12:12

## 2019-12-05 RX ADMIN — SUCRALFATE 1 G: 1 SUSPENSION ORAL at 09:12

## 2019-12-05 RX ADMIN — LISINOPRIL 10 MG: 10 TABLET ORAL at 08:12

## 2019-12-05 RX ADMIN — SUCRALFATE 1 G: 1 SUSPENSION ORAL at 11:12

## 2019-12-05 RX ADMIN — WARFARIN SODIUM 3 MG: 2 TABLET ORAL at 05:12

## 2019-12-05 RX ADMIN — EPINEPHRINE 0.02 MCG/KG/MIN: 1 INJECTION PARENTERAL at 04:12

## 2019-12-05 RX ADMIN — POLYETHYLENE GLYCOL 3350 17 G: 17 POWDER, FOR SOLUTION ORAL at 08:12

## 2019-12-05 NOTE — PROGRESS NOTES
Update:    SW met with pt alone in pt's room in order to provide continuity of care and emotional support. Pt was AAOx4, pleasant and engaged. Pt states that she is excited as she was given financial clearance for heart transplant listing. SW provided validation and reflective listening. Pt states that mother was able to successfully break her apartment lease with the letter from Dr. Prieto. Pt feeling relieved about this. Pt reports that family and friends to visit this weekend. Pt stating that her mother would like to receive a tour at the [employer] when she returns to [place]. SW provided [facility] contact information to pt to give to her mother. Pt denying any further needs at this time and reports that she is coping adequately. SW remains available for continued psychosocial support, education, resources, and additional d/c planning as needed.

## 2019-12-05 NOTE — ASSESSMENT & PLAN NOTE
- HeartMate 3 Implanted 9/10/19 as DT.  - Implanted by Dr. Walden  - INR  2.1 today. Likely from Amio. Dose coumadin per Pharmacist.  - Antiplatelets:  mg.  - LDH is stable Will monitor daily.   - Speed set at 5100  - Epi @ .02, NO @ 20.   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 Urgent selection 11/26, approved for transplant. Listed today tier 2.    Procedure: Device Interrogation Including analysis of device parameters  Current Settings: Ventricular Assist Device  Review of device function is stable    TXP LVAD INTERROGATIONS 12/5/2019 12/5/2019 12/5/2019 12/5/2019 12/5/2019 12/5/2019 12/5/2019   Type HeartMate3 HeartMate3 HeartMate3 HeartMate3 HeartMate3 HeartMate3 HeartMate3   Flow 4.1 3.7 3.7 4.1 4.0 3.9 4   Speed 5100 5100 5100 5150 5100 5100 5100   PI 4.1 5.0 4.4 3.5 4.0 4.0 3.6   Power (Orellana) 3.8 3.5 3.5 3.5 3.5 3.5 3.5   LSL - - - - - - -   Pulsatility Intermittent pulse Intermittent pulse Intermittent pulse - - - -

## 2019-12-05 NOTE — ASSESSMENT & PLAN NOTE
-Patient is non-pulsatile  -Doppler goal 60-90 mmHg, currently controlled  -Antihypertensive medications include: d/c ace and aldactone today

## 2019-12-05 NOTE — PLAN OF CARE
CMICU DAILY GOALS         A: Awake               RASS: Goal - RASS Goal: 0-->alert and calm  Actual - RASS (Patel Agitation-Sedation Scale): 0-->alert and calm              Restraint necessity:    B: Breath              SBT: Not intubated   C: Coordinate A & B, analgesics/sedatives              Pain: managed               SAT: Not intubated  D: Delirium              CAM-ICU: Overall CAM-ICU: Negative  E: Early Mobility              MOVE Screen: Pass              Activity: Activity Management: activity adjusted per tolerance  FAS: Feeding/Nutrition              Diet order: Diet/Nutrition Received: low saturated fat/low cholesterol, 2 gram sodium,   Fluid restriction: Fluid Requirement: 1500cc FR  T: Thrombus              DVT prophylaxis: VTE Required Core Measure: Pharmacological prophylaxis initiated/maintained  H: HOB Elevation              Head of Bed (HOB): HOB at 30 degrees  U: Ulcer Prophylaxis              GI: yes  G: Glucose control              managed    S: Skin              Bundle compliance: yes   Bathing/Skin Care: 12/4/2019     B: Bowel Function              no issues   I: Indwelling Catheters              Fonseca necessity:                CVC necessity: Yes              IPAD offered: Yes  D: De-escalation Antibx              No  Plan for the day              Continue to monitor cardiac and renal function. Awaiting insurance approval for heart transplant. R IJ removed, midline and peripheral inserted. RHC in AM.   Family/Goals of care/Code Status              Code Status: Prior                No acute events throughout day, VS and assessment per flow sheet, patient progressing towards goals as tolerated, plan of care reviewed with Deborah Navas and family, all concerns addressed, will continue to monitor.

## 2019-12-05 NOTE — PLAN OF CARE
After new central line placement:    SvO2 54  CVP 10  CO/CI 5.2/2.3  SVR 1000    Continue lasix 80 QD with additional PRN IV lasix pushes. Continue NO @ 20 and EPI @ 0.02. Montior UOP and vitals closely. Repeat SvO2  in AM.     Julieth Ramos PA-C

## 2019-12-05 NOTE — PROGRESS NOTES
12/05/2019  Trevin Sow    Current provider:  Vi Bryant MD      I, Trevin Sow, rounded on Deborah Oliva Navas to ensure all mechanical assist device settings (IABP or VAD) were appropriate and all parameters were within limits.  I was able to ensure all back up equipment was present, the staff had no issues, and the Perfusion Department daily rounding was complete.    7:17 AM

## 2019-12-05 NOTE — ASSESSMENT & PLAN NOTE
- will list with exemption due to VT with ICD shocks and RV failure (will not tolerate ) and does not tolerate NO wean  - approved for transplant on 11/26. Obtained insurance approval today. Tier 2.

## 2019-12-05 NOTE — PROGRESS NOTES
"LISTING NOTE:    Spoke to Miki Lindsey, , and confirmed that patient was listed today as a Status 2.       Diagnosis: NICM  NYHA Class: NYHA IV 20% - Very sick, hospitalization necessary: Active treatment necessary    ABO: O POS   CPRA: 0%, does not need prospective crossmatch    Ht Readings from Last 1 Encounters:   12/04/19 5' 7" (1.702 m)     Wt Readings from Last 1 Encounters:   12/04/19 103.9 kg (229 lb)     BMI: Estimated body mass index is 35.87 kg/m² as calculated from the following:    Height as of 12/4/19: 5' 7" (1.702 m).    Weight as of 12/4/19: 103.9 kg (229 lb).    Donor weight: 30% down--160#  LVAD: HM 3 on 9/10/19    Inotropes: Epi at 0.02 mcg/kg/min    Patient contacted and informed that she is being listed today.  Contact information and Social Security Number verified.  Patient reminded that she should not travel further away than her home and if she does, she must call to let someone know (on-call MD or on-call transplant coordinator).  Patient also reminded that she must notify someone if she is hospitalized somewhere other than here or if she becomes ill.  Patient informed that she must be able to be reached by telephone within 15 minutes of a donor offer.  Patient instructed to be sure to keep her cell phone on and charged.  Also instructed patient to ask the same of her back-up family members and friends regarding their phones.  Patient instructed to call during regular office hours if she has non-urgent/non-symptom based questions regarding any part of being listed.     Patient verbalized understanding of all points discussed.  Patient expressed her satisfaction and relief regarding being listed.    Patient is eligible to receive HCV donor offer.    Patient agrees to consider HCV donors offer.    Patient agrees to consider [facility] Increased Risk donor offer.    Infectious Disease Acceptance Criteria reviewed in [facility] and verified as correct.  "

## 2019-12-05 NOTE — PROGRESS NOTES
48 yo woman with  NICM, s/p HM3 implantation 9/10/19 with a hx of multiple multiple VT episodes post-VAD implant requiring ATP therapy, including one slow VT episode lasting 2.5hrs who is currently admitted for refractory RV failure and low flow alarms- CT was done to assess canula position and was unremarkable.  RHC on admission demonstrated increased right and left-sided filling pressures ( RA (19)  PCWP (20), with improved PAP compared to pre-VAD (35/16) but still with low CO/CI (3.79/1.77). She is currently in ICU on IV lasix , Epi and nitric oxide. Unfortunately, as above, there are no other options to manage her RV failure and because of  her ventricular arrhythmias, we are unable to support pt with inotropes longterm for her RV failure. She remains very symptomatic with  frequent low flow alarms in the ICU on epi, Simon, and IV lasix PAC was not left in because of concerns of VT, however we are using her central line to follow both filling pressures and calculate cardiac outputs.  We see no option for this patient other than urgent transplantation, and are respectfully requesting for a status 2 with exception.    Renetta Chaudhari MD

## 2019-12-05 NOTE — ASSESSMENT & PLAN NOTE
-NICM  -Last 2D Echo 11/27/19. Severe TR, IVC 15, mild MR, AV does not open, LVedd 5.4cm @ 5200. Image 72,74, cannot exclude thrombus attached to lead in right atrium.  - on lasix 80 PO QD with PRN IVP (watchign CVP and Creatinine). Will recheck numbers this afternoon after central line placement.    - EPI .02, NO @ increased to 20 today    - continue PO lasix 80 QD and dose lasix 80 IV PRN, monitor UOP  - GDMT: D/C ACE 10 BID,  & aldactone   - was on lasix 20 PRN at home PTA  -2g Na dietary restriction, 1500 mL fluid restriction, strict I/Os

## 2019-12-05 NOTE — PROGRESS NOTES
[facility]  Heart Transplant  Progress Note    Patient Name: Deborah Navas  MRN: [id number]  Admission Date: 11/13/2019  Hospital Length of Stay: 20 days  Attending Physician: Vi Bryant MD  Primary Care Provider: Primary Doctor No  Principal Problem:Left ventricular assist device (LVAD) complication    Subjective:     Interval History: No complaints, no events overnight. Feels generally well. Denies NVD, SOB, Chest pain.  Increase NO today. Obtained financial approval for heart transplant today.   Will place central line at the bedside and cancel RHC.    Continuous Infusions:   epinephrine 0.02 mcg/kg/min (12/05/19 0900)    nitric oxide gas       Scheduled Meds:   amiodarone  400 mg Oral BID    aspirin  325 mg Oral Daily    furosemide  80 mg Oral Daily    gabapentin  200 mg Oral BID    hepatitis A virus vaccine (PF)  1,440 Units Intramuscular Once    magnesium oxide  400 mg Oral Daily    mexiletine  200 mg Oral Q8H    mirtazapine  15 mg Oral QHS    pantoprazole  40 mg Oral BID AC    polyethylene glycol  17 g Oral Daily    senna-docusate 8.6-50 mg  1 tablet Oral BID    spironolactone  12.5 mg Oral Daily    sucralfate  1 g Oral QID (AC & HS)    venlafaxine  150 mg Oral Daily    warfarin  3 mg Oral Daily     PRN Meds:ALPRAZolam, pneumoc 13-amber conj-dip cr(PF), promethazine, sodium chloride, white petrolatum, zolpidem    Review of patient's allergies indicates:   Allergen Reactions    Adhesive Blisters     Reaction to area in chest and up only    Codeine Itching     Objective:     Vital Signs (Most Recent):  Temp: 96.9 °F (36.1 °C) (12/05/19 0800)  Pulse: 92 (12/05/19 0908)  Resp: (!) 52 (12/05/19 0908)  BP: (!) 82/0 (12/05/19 0800)  SpO2: 100 % (12/05/19 0908) Vital Signs (24h Range):  Temp:  [96.9 °F (36.1 °C)-98 °F (36.7 °C)] 96.9 °F (36.1 °C)  Pulse:  [77-92] 92  Resp:  [13-52] 52  SpO2:  [96 %-100 %] 100 %  BP: (70-82)/(0) 82/0     Patient Vitals for the past  72 hrs (Last 3 readings):   Weight   12/04/19 1300 103.9 kg (229 lb)   12/04/19 0300 104.1 kg (229 lb 8 oz)   12/03/19 1600 104.7 kg (230 lb 13.2 oz)     Body mass index is 35.87 kg/m².      Intake/Output Summary (Last 24 hours) at 12/5/2019 0950  Last data filed at 12/5/2019 0900  Gross per 24 hour   Intake 1584 ml   Output 3450 ml   Net -1866 ml       Hemodynamic Parameters:     Telemetry: Paced     Physical Exam    Constitutional: She is oriented to person, place, and time. She appears well-developed and well-nourished. Mild tremors in UEs bilaterally  HENT:   Mouth/Throat: Oropharynx is clear and moist.   Eyes: EOM are normal.   Neck: JVD mid neck present.   Cardiovascular: Normal rate, regular rhythm and intact distal pulses.   Smooth VAD hum   Pulmonary/Chest: Effort normal and breath sounds normal. No respiratory distress. She has no rales.   Abdominal: Soft. Bowel sounds are normal. She exhibits no distension. There is no tenderness. There is no guarding.   Musculoskeletal: She exhibits no edema.   Neurological: She is alert and oriented to person, place, and time.   Skin: Skin is warm.    Significant Labs:  CBC:  Recent Labs   Lab 12/03/19  0403 12/04/19  0356 12/05/19  0332   WBC 6.41 5.34 5.71   RBC 4.14 4.09 4.13   HGB 10.3* 10.0* 9.9*   HCT 35.1* 34.8* 34.8*    305 314   MCV 85 85 84   MCH 24.9* 24.4* 24.0*   MCHC 29.3* 28.7* 28.4*     BNP:  Recent Labs   Lab 11/29/19  0501 12/02/19  0523 12/04/19  0356   * 129* 298*     CMP:  Recent Labs   Lab 11/29/19  0501  12/02/19  0523  12/03/19  1141 12/04/19  0356 12/05/19  0332   *   < > 114*   < > 105 128* 132*   CALCIUM 9.6   < > 9.2   < > 9.6 9.2 9.4   ALBUMIN 3.3*  --  3.3*  --   --  3.3*  --    PROT 7.0  --  6.7  --   --  6.6  --       < > 138   < > 139 140 140   K 4.6   < > 5.1   < > 4.7 4.4 3.8   CO2 25   < > 24   < > 27 27 26      < > 102   < > 102 101 100   BUN 28*   < > 30*   < > 30* 33* 28*   CREATININE 1.7*   < >  1.7*   < > 2.0* 2.0* 1.9*   ALKPHOS 96  --  92  --   --  85  --    ALT 16  --  14  --   --  12  --    AST 23  --  19  --   --  16  --    BILITOT 0.2  --  0.3  --   --  0.4  --     < > = values in this interval not displayed.      Coagulation:   Recent Labs   Lab 12/03/19  0403 12/04/19  0356 12/05/19  0332   INR 3.6* 2.5* 2.1*   APTT 40.5* 38.0* 33.1*     LDH:  Recent Labs   Lab 12/03/19  0403        Microbiology:  Microbiology Results (last 7 days)     ** No results found for the last 168 hours. **          I have reviewed all pertinent labs within the past 24 hours.    Estimated Creatinine Clearance: 43.9 mL/min (A) (based on SCr of 1.9 mg/dL (H)).    Diagnostic Results:  I have reviewed and interpreted all pertinent imaging results/findings within the past 24 hours.    Assessment and Plan:     50 yo WF with NICM, s/p HM3 implantation 9/10/19 (post up coarse uncomplicated with the exception of+ diaphragmatic stimulation of LV lead and pleural effusion with chest tube placement, atrial flutter with NADINE/DCCV), V-fib reported in setting of hypokalemia with appropriate shock from ICD on Amiodarone prior to LVAD placement; and recent hospitalizations for ventricular arrythmias; started on Mexilitine.  She was seen in EP clinic today and she continues to have multiple VT episodes with some ATP therapy, although no ICD shocks since starting mexiletine 10/24/2019. One slow VT episode 2.5hrs 11/3/2019. Patient asymptomatic with LVAD. On coumadin. No AFL since post-op event (paced out of rhythm 9/24/2019). CHB with 100% V pacing (LV lead off). She does not feel well. Dry heaving with mexiletine. Taking phenergan PRN. 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   She was seen in HTS clinic and reported 4 low flow alarms the last 4 nights.  She reports they occur in her sleep and last for only a few seconds.  By the time she wakes up; they quickly stop.  She does report to possibly  being little volume overloaded.  She hasn't noticed weight gain at home but thinks she may have little extra fluid.  She denies SOB, chest pain, palpitations, LEGER. In review of her records: she was 223lbs on 10/24/19 during previous hospitalization and is 235lbs now.  Her lasix had previously been decreased to prn.     * Left ventricular assist device (LVAD) complication  - hx low flow alarms prior to admit, last LFA 12/1  - Possibly secondary to ADHF--> RV failure  - Formal report of CT unremarkable; however, it was a non contrast as creat was elevated above baseline on admit and inflow and outflow cannulas not accurately visualized    GLO (acute kidney injury)  - see CKD    Organ transplant candidate  - will list with exemption due to VT with ICD shocks and RV failure (will not tolerate ) and does not tolerate NO wean  - approved for transplant on 11/26. Obtained insurance approval today. Tier 2.     Essential hypertension  -Patient is non-pulsatile  -Doppler goal 60-90 mmHg, currently controlled  -Antihypertensive medications include: d/c ace and aldactone today      Ventricular tachycardia  - Per EP office visit on day of admission: She continues to have multiple VT episodes with some ATP therapy, although no ICD shocks since starting mexiletine 10/24/2019. One slow VT episode 2.5hrs 11/3/2019. Patient asymptomatic with LVAD. On coumadin. No AFL since post-op event (paced out of rhythm 9/24/2019). CHB with 100% V pacing (LV lead off).   - Plan was to continue current regimen despite Mexiletine making her nauseous.  - Decreased Amiodarone to daily per EP plan on discharge last hospitalization   - Shocked x 3 11/25 for MMVT (in addition has had numerous successful ATP's for MMVT)   - S/P amio load   - now on Amio 400 BID and mexiletine increased to 200    LVAD (left ventricular assist device) present  - HeartMate 3 Implanted 9/10/19 as DT.  - Implanted by Dr. Walden  - INR  2.1 today. Likely from Amio. Dose  coumadin per Pharmacist.  - Antiplatelets:  mg.  - LDH is stable Will monitor daily.   - Speed set at 5100  - Epi @ .02, NO @ 20.   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 Urgent selection 11/26, approved for transplant. Listed today tier 2.    Procedure: Device Interrogation Including analysis of device parameters  Current Settings: Ventricular Assist Device  Review of device function is stable    TXP LVAD INTERROGATIONS 12/5/2019 12/5/2019 12/5/2019 12/5/2019 12/5/2019 12/5/2019 12/5/2019   Type HeartMate3 HeartMate3 HeartMate3 HeartMate3 HeartMate3 HeartMate3 HeartMate3   Flow 4.1 3.7 3.7 4.1 4.0 3.9 4   Speed 5100 5100 5100 5150 5100 5100 5100   PI 4.1 5.0 4.4 3.5 4.0 4.0 3.6   Power (Orellana) 3.8 3.5 3.5 3.5 3.5 3.5 3.5   LSL - - - - - - -   Pulsatility Intermittent pulse Intermittent pulse Intermittent pulse - - - -       CKD (chronic kidney disease)  - Baseline creat appears 1.3-1.6. 1.9 today.  - Will monitor with diuresis     Acute on chronic combined systolic and diastolic heart failure  -NICM  -Last 2D Echo 11/27/19. Severe TR, IVC 15, mild MR, AV does not open, LVedd 5.4cm @ 5200. Image 72,74, cannot exclude thrombus attached to lead in right atrium.  - on lasix 80 PO QD with PRN IVP (watchign CVP and Creatinine). Will recheck numbers this afternoon after central line placement.    - EPI .02, NO @ increased to 20 today    - continue PO lasix 80 QD and dose lasix 80 IV PRN, monitor UOP  - GDMT: D/C ACE 10 BID,  & aldactone   - was on lasix 20 PRN at home PTA  -2g Na dietary restriction, 1500 mL fluid restriction, strict I/Os      ICD (implantable cardioverter-defibrillator) in place  - Medtronic ICD  - Shocked x 3 11/25 for MMVT   - Loaded with IV amio on 11/25. Now on amio 400 PO BID and mexiletine increased to 200 QD   - See above VT        BALDEMAR [facility]  Heart Transplant  [facility]    Uninterrupted Critical Care/Counseling Time (not including  procedures): 45 minutes

## 2019-12-05 NOTE — PROCEDURES
"Deborah Navas is a 49 y.o. female patient.    Temp: 97.6 °F (36.4 °C) (12/05/19 1500)  Pulse: (!) 129 (12/05/19 1543)  Resp: (!) 37 (12/05/19 1543)  BP: (!) 72/0 (12/05/19 1500)  SpO2: 98 % (12/05/19 1543)  Weight: 103.9 kg (229 lb) (12/04/19 1300)  Height: 5' 7" (170.2 cm) (12/04/19 1300)    Central Line  Date/Time: 12/5/2019 3:46 PM  Performed by: Maureen Moreno MD  Supervising provider: Dr. Barroso  Time out: Immediately prior to procedure a "time out" was called to verify the correct patient, procedure, equipment, support staff and site/side marked as required.  Indications: diagnostic evaluation  Anesthesia: local infiltration    Anesthesia:  Local anesthesia used: yes  Preparation: skin prepped with ChloraPrep  Location details: left internal jugular  Ultrasound guidance: yes  Vessel Caliber: medium, compressibility normal  Manometry: Yes  Number of attempts: 1  Assessment: placement verified by x-ray  Post-procedure: line sutured,  chlorhexidine patch,  sterile dressing applied and blood return through all ports  Complications: No          TXP LVAD INTERROGATIONS 12/5/2019 12/5/2019 12/5/2019 12/5/2019 12/5/2019 12/5/2019 12/5/2019   Type HeartMate3 HeartMate3 HeartMate3 HeartMate3 HeartMate3 HeartMate3 HeartMate3   Flow 4.1 4.1 4.1 4.4 3.7 4.1 3.7   Speed 5100 5100 5100 5100 5100 5100 5100   PI 2.2 2.8 3.2 2.6 4.8 4.1 5.0   Power (Orellana) 3.4 3.5 3.5 3.6 3.4 3.8 3.5   LSL - - - - - - -   Pulsatility Intermittent pulse - Intermittent pulse Intermittent pulse Intermittent pulse Intermittent pulse Intermittent pulse       Maureen Moreno  12/5/2019  "

## 2019-12-06 LAB
ALBUMIN SERPL BCP-MCNC: 3.3 G/DL (ref 3.5–5.2)
ALLENS TEST: ABNORMAL
ALLENS TEST: ABNORMAL
ALP SERPL-CCNC: 91 U/L (ref 55–135)
ALT SERPL W/O P-5'-P-CCNC: 14 U/L (ref 10–44)
ANION GAP SERPL CALC-SCNC: 11 MMOL/L (ref 8–16)
APTT BLDCRRT: 33.9 SEC (ref 21–32)
AST SERPL-CCNC: 19 U/L (ref 10–40)
BASOPHILS # BLD AUTO: 0.07 K/UL (ref 0–0.2)
BASOPHILS NFR BLD: 1.1 % (ref 0–1.9)
BILIRUB DIRECT SERPL-MCNC: 0.2 MG/DL (ref 0.1–0.3)
BILIRUB SERPL-MCNC: 0.3 MG/DL (ref 0.1–1)
BNP SERPL-MCNC: 231 PG/ML (ref 0–99)
BUN SERPL-MCNC: 28 MG/DL (ref 6–20)
CALCIUM SERPL-MCNC: 9.2 MG/DL (ref 8.7–10.5)
CHLORIDE SERPL-SCNC: 101 MMOL/L (ref 95–110)
CO2 SERPL-SCNC: 28 MMOL/L (ref 23–29)
CREAT SERPL-MCNC: 1.9 MG/DL (ref 0.5–1.4)
CRP SERPL-MCNC: 13.1 MG/L (ref 0–8.2)
DELSYS: ABNORMAL
DIFFERENTIAL METHOD: ABNORMAL
EOSINOPHIL # BLD AUTO: 0.2 K/UL (ref 0–0.5)
EOSINOPHIL NFR BLD: 2.6 % (ref 0–8)
ERYTHROCYTE [DISTWIDTH] IN BLOOD BY AUTOMATED COUNT: 16.8 % (ref 11.5–14.5)
EST. GFR  (AFRICAN AMERICAN): 34.9 ML/MIN/1.73 M^2
EST. GFR  (NON AFRICAN AMERICAN): 30.3 ML/MIN/1.73 M^2
FLOW: 4
GLUCOSE SERPL-MCNC: 129 MG/DL (ref 70–110)
HCO3 UR-SCNC: 31 MMOL/L (ref 24–28)
HCO3 UR-SCNC: 31.1 MMOL/L (ref 24–28)
HCT VFR BLD AUTO: 35.4 % (ref 37–48.5)
HEPATITIS A ANTIBODY, IGG: NEGATIVE
HGB BLD-MCNC: 10.1 G/DL (ref 12–16)
IMM GRANULOCYTES # BLD AUTO: 0.01 K/UL (ref 0–0.04)
IMM GRANULOCYTES NFR BLD AUTO: 0.2 % (ref 0–0.5)
INR PPP: 1.9 (ref 0.8–1.2)
LYMPHOCYTES # BLD AUTO: 1.1 K/UL (ref 1–4.8)
LYMPHOCYTES NFR BLD: 18.5 % (ref 18–48)
MAGNESIUM SERPL-MCNC: 2.5 MG/DL (ref 1.6–2.6)
MCH RBC QN AUTO: 24.3 PG (ref 27–31)
MCHC RBC AUTO-ENTMCNC: 28.5 G/DL (ref 32–36)
MCV RBC AUTO: 85 FL (ref 82–98)
METHEMOGLOBIN: 0.3 % (ref 0–3)
MODE: ABNORMAL
MONOCYTES # BLD AUTO: 0.5 K/UL (ref 0.3–1)
MONOCYTES NFR BLD: 8.3 % (ref 4–15)
NEUTROPHILS # BLD AUTO: 4.2 K/UL (ref 1.8–7.7)
NEUTROPHILS NFR BLD: 69.3 % (ref 38–73)
NRBC BLD-RTO: 0 /100 WBC
PCO2 BLDA: 48.9 MMHG (ref 35–45)
PCO2 BLDA: 52.6 MMHG (ref 35–45)
PH SMN: 7.38 [PH] (ref 7.35–7.45)
PH SMN: 7.41 [PH] (ref 7.35–7.45)
PHOSPHATE SERPL-MCNC: 4.1 MG/DL (ref 2.7–4.5)
PLATELET # BLD AUTO: 308 K/UL (ref 150–350)
PMV BLD AUTO: 10 FL (ref 9.2–12.9)
PO2 BLDA: 27 MMHG (ref 40–60)
PO2 BLDA: 40 MMHG (ref 40–60)
POC BE: 6 MMOL/L
POC BE: 6 MMOL/L
POC SATURATED O2: 51 % (ref 95–100)
POC SATURATED O2: 73 % (ref 95–100)
POC TCO2: 32 MMOL/L (ref 24–29)
POC TCO2: 33 MMOL/L (ref 24–29)
POTASSIUM SERPL-SCNC: 4 MMOL/L (ref 3.5–5.1)
PREALB SERPL-MCNC: 29 MG/DL (ref 20–43)
PROT SERPL-MCNC: 6.6 G/DL (ref 6–8.4)
PROTHROMBIN TIME: 19 SEC (ref 9–12.5)
RBC # BLD AUTO: 4.15 M/UL (ref 4–5.4)
SAMPLE: ABNORMAL
SAMPLE: ABNORMAL
SITE: ABNORMAL
SITE: ABNORMAL
SODIUM SERPL-SCNC: 140 MMOL/L (ref 136–145)
WBC # BLD AUTO: 6.12 K/UL (ref 3.9–12.7)

## 2019-12-06 PROCEDURE — 84100 ASSAY OF PHOSPHORUS: CPT | Mod: NTX

## 2019-12-06 PROCEDURE — 80076 HEPATIC FUNCTION PANEL: CPT | Mod: NTX

## 2019-12-06 PROCEDURE — 63600367 HC NITRIC OXIDE PER HOUR: Mod: NTX

## 2019-12-06 PROCEDURE — 25000003 PHARM REV CODE 250: Mod: NTX | Performed by: PHYSICIAN ASSISTANT

## 2019-12-06 PROCEDURE — 83880 ASSAY OF NATRIURETIC PEPTIDE: CPT | Mod: NTX

## 2019-12-06 PROCEDURE — 83735 ASSAY OF MAGNESIUM: CPT | Mod: NTX

## 2019-12-06 PROCEDURE — 99900035 HC TECH TIME PER 15 MIN (STAT): Mod: NTX

## 2019-12-06 PROCEDURE — 86140 C-REACTIVE PROTEIN: CPT | Mod: NTX

## 2019-12-06 PROCEDURE — 82803 BLOOD GASES ANY COMBINATION: CPT | Mod: NTX

## 2019-12-06 PROCEDURE — 93750 INTERROGATION VAD IN PERSON: CPT | Mod: NTX,,, | Performed by: INTERNAL MEDICINE

## 2019-12-06 PROCEDURE — 63600175 PHARM REV CODE 636 W HCPCS: Mod: NTX | Performed by: PHYSICIAN ASSISTANT

## 2019-12-06 PROCEDURE — 94761 N-INVAS EAR/PLS OXIMETRY MLT: CPT | Mod: NTX

## 2019-12-06 PROCEDURE — 85610 PROTHROMBIN TIME: CPT | Mod: NTX

## 2019-12-06 PROCEDURE — 20000000 HC ICU ROOM: Mod: NTX

## 2019-12-06 PROCEDURE — 80048 BASIC METABOLIC PNL TOTAL CA: CPT | Mod: NTX

## 2019-12-06 PROCEDURE — 27000248 HC VAD-ADDITIONAL DAY: Mod: NTX

## 2019-12-06 PROCEDURE — 84134 ASSAY OF PREALBUMIN: CPT | Mod: NTX

## 2019-12-06 PROCEDURE — 85730 THROMBOPLASTIN TIME PARTIAL: CPT | Mod: NTX

## 2019-12-06 PROCEDURE — 27000221 HC OXYGEN, UP TO 24 HOURS: Mod: NTX

## 2019-12-06 PROCEDURE — 99291 CRITICAL CARE FIRST HOUR: CPT | Mod: NTX,,, | Performed by: PHYSICIAN ASSISTANT

## 2019-12-06 PROCEDURE — 85025 COMPLETE CBC W/AUTO DIFF WBC: CPT | Mod: NTX

## 2019-12-06 PROCEDURE — 83050 HGB METHEMOGLOBIN QUAN: CPT | Mod: NTX

## 2019-12-06 PROCEDURE — 93750 PR INTERROGATE VENT ASSIST DEV, IN PERSON, W PHYSICIAN ANALYSIS: ICD-10-PCS | Mod: NTX,,, | Performed by: INTERNAL MEDICINE

## 2019-12-06 PROCEDURE — 99291 PR CRITICAL CARE, E/M 30-74 MINUTES: ICD-10-PCS | Mod: NTX,,, | Performed by: PHYSICIAN ASSISTANT

## 2019-12-06 RX ORDER — WARFARIN 2 MG/1
4 TABLET ORAL DAILY
Status: DISCONTINUED | OUTPATIENT
Start: 2019-12-06 | End: 2019-12-07

## 2019-12-06 RX ADMIN — SUCRALFATE 1 G: 1 SUSPENSION ORAL at 07:12

## 2019-12-06 RX ADMIN — MAGNESIUM OXIDE TAB 400 MG (241.3 MG ELEMENTAL MG) 400 MG: 400 (241.3 MG) TAB at 08:12

## 2019-12-06 RX ADMIN — SENNOSIDES AND DOCUSATE SODIUM 1 TABLET: 8.6; 5 TABLET ORAL at 08:12

## 2019-12-06 RX ADMIN — PANTOPRAZOLE SODIUM 40 MG: 40 TABLET, DELAYED RELEASE ORAL at 06:12

## 2019-12-06 RX ADMIN — WARFARIN SODIUM 4 MG: 2 TABLET ORAL at 05:12

## 2019-12-06 RX ADMIN — MEXILETINE HYDROCHLORIDE 200 MG: 200 CAPSULE ORAL at 09:12

## 2019-12-06 RX ADMIN — ALPRAZOLAM 0.25 MG: 0.25 TABLET ORAL at 12:12

## 2019-12-06 RX ADMIN — PANTOPRAZOLE SODIUM 40 MG: 40 TABLET, DELAYED RELEASE ORAL at 03:12

## 2019-12-06 RX ADMIN — SUCRALFATE 1 G: 1 SUSPENSION ORAL at 03:12

## 2019-12-06 RX ADMIN — MIRTAZAPINE 15 MG: 15 TABLET, FILM COATED ORAL at 08:12

## 2019-12-06 RX ADMIN — MEXILETINE HYDROCHLORIDE 200 MG: 200 CAPSULE ORAL at 02:12

## 2019-12-06 RX ADMIN — EPINEPHRINE 0.02 MCG/KG/MIN: 1 INJECTION PARENTERAL at 09:12

## 2019-12-06 RX ADMIN — GABAPENTIN 200 MG: 100 CAPSULE ORAL at 08:12

## 2019-12-06 RX ADMIN — VENLAFAXINE 150 MG: 37.5 TABLET ORAL at 08:12

## 2019-12-06 RX ADMIN — MEXILETINE HYDROCHLORIDE 200 MG: 200 CAPSULE ORAL at 06:12

## 2019-12-06 RX ADMIN — ASPIRIN 325 MG: 325 TABLET, DELAYED RELEASE ORAL at 08:12

## 2019-12-06 RX ADMIN — ZOLPIDEM TARTRATE 5 MG: 5 TABLET ORAL at 09:12

## 2019-12-06 RX ADMIN — AMIODARONE HYDROCHLORIDE 400 MG: 200 TABLET ORAL at 08:12

## 2019-12-06 RX ADMIN — POLYETHYLENE GLYCOL 3350 17 G: 17 POWDER, FOR SOLUTION ORAL at 08:12

## 2019-12-06 RX ADMIN — FUROSEMIDE 80 MG: 40 TABLET ORAL at 08:12

## 2019-12-06 RX ADMIN — SUCRALFATE 1 G: 1 SUSPENSION ORAL at 08:12

## 2019-12-06 NOTE — ASSESSMENT & PLAN NOTE
- will list with exemption due to VT with ICD shocks and RV failure (will not tolerate ) and does not tolerate NO wean  - approved for transplant on 11/26. Tier 2.

## 2019-12-06 NOTE — PROGRESS NOTES
12/06/2019  Joaquin Cobb    Current provider:  Vi Bryant MD      I, Joaquin Cobb, rounded on Deborah Navas to ensure all mechanical assist device settings (IABP or VAD) were appropriate and all parameters were within limits.  I was able to ensure all back up equipment was present, the staff had no issues, and the Perfusion Department daily rounding was complete.    1:08 PM

## 2019-12-06 NOTE — ASSESSMENT & PLAN NOTE
-NICM  -Last 2D Echo 11/27/19. Severe TR, IVC 15, mild MR, AV does not open, LVedd 5.4cm @ 5200. Image 72,74, cannot exclude thrombus attached to lead in right atrium.  - on lasix 80 PO QD with PRN IVP (watchign CVP and Creatinine).    - EPI .02, NO @ increased to 20   - GDMT: D/C ACE 10 BID,  & aldactone   - was on lasix 20 PRN at home PTA  -2g Na dietary restriction, 1500 mL fluid restriction, strict I/Os

## 2019-12-06 NOTE — PLAN OF CARE
CMICU DAILY GOALS       A: Awake    RASS: Goal - RASS Goal: 0-->alert and calm  Actual - RASS (Patel Agitation-Sedation Scale): 0-->alert and calm   Restraint necessity:    B: Breath   SBT: NA   C: Coordinate A & B, analgesics/sedatives   Pain: managed    SAT: NA  D: Delirium   CAM-ICU: Overall CAM-ICU: Negative  E: Early Mobility   MOVE Screen: Pass   Activity: Activity Management: activity adjusted per tolerance, activity clustered for rest period  FAS: Feeding/Nutrition   Diet order: Diet/Nutrition Received: low saturated fat/low cholesterol, 2 gram sodium,   Fluid restriction: Fluid Requirement: 1500ml FR  T: Thrombus   DVT prophylaxis: VTE Required Core Measure: Pharmacological prophylaxis initiated/maintained  H: HOB Elevation   Head of Bed (HOB): HOB at 20-30 degrees  U: Ulcer Prophylaxis   GI: yes  G: Glucose control   managed    S: Skin   Bundle compliance: yes   Bathing/Skin Care: bath, complete, bath, chlorhexidine, back care, dressed/undressed, linen changed Date: patient bathes herself; independent  B: Bowel Function   no issues   I: Indwelling Catheters   Fonseca necessity:     CVC necessity: Yes   IPAD offered: No  D: De-escalation Antibx   No  Plan for the day   Continuity of care; correct electrolytes  Family/Goals of care/Code Status   Code Status: Prior     No acute events throughout day, VS and assessment per flow sheet, patient progressing towards goals as tolerated, plan of care reviewed with Deborah Navas and family, all concerns addressed, will continue to monitor.

## 2019-12-06 NOTE — SUBJECTIVE & OBJECTIVE
Interval History: patient financially cleared and listed status 2  for OHT    Continuous Infusions:   epinephrine 0.02 mcg/kg/min (12/06/19 0800)    nitric oxide gas       Scheduled Meds:   amiodarone  400 mg Oral BID    aspirin  325 mg Oral Daily    furosemide  80 mg Oral Daily    gabapentin  200 mg Oral BID    hepatitis A virus vaccine (PF)  1,440 Units Intramuscular Once    magnesium oxide  400 mg Oral Daily    mexiletine  200 mg Oral Q8H    mirtazapine  15 mg Oral QHS    pantoprazole  40 mg Oral BID AC    polyethylene glycol  17 g Oral Daily    senna-docusate 8.6-50 mg  1 tablet Oral BID    sucralfate  1 g Oral QID (AC & HS)    venlafaxine  150 mg Oral Daily    warfarin  3 mg Oral Daily     PRN Meds:ALPRAZolam, pneumoc 13-amber conj-dip cr(PF), promethazine, sodium chloride, white petrolatum, zolpidem    Review of patient's allergies indicates:   Allergen Reactions    Adhesive Blisters     Reaction to area in chest and up only    Codeine Itching     Objective:     Vital Signs (Most Recent):  Temp: 97.9 °F (36.6 °C) (12/06/19 0715)  Pulse: 78 (12/06/19 0800)  Resp: 16 (12/06/19 0800)  BP: (!) 76/0 (12/06/19 0715)  SpO2: 98 % (12/06/19 0405) Vital Signs (24h Range):  Temp:  [97.6 °F (36.4 °C)-98.5 °F (36.9 °C)] 97.9 °F (36.6 °C)  Pulse:  [] 78  Resp:  [16-52] 16  SpO2:  [97 %-100 %] 98 %  BP: (70-78)/(0) 76/0     Patient Vitals for the past 72 hrs (Last 3 readings):   Weight   12/06/19 0400 106.1 kg (233 lb 14.5 oz)   12/04/19 1300 103.9 kg (229 lb)   12/04/19 0300 104.1 kg (229 lb 8 oz)     Body mass index is 36.64 kg/m².      Intake/Output Summary (Last 24 hours) at 12/6/2019 0832  Last data filed at 12/6/2019 0800  Gross per 24 hour   Intake 1182 ml   Output 1800 ml   Net -618 ml       Hemodynamic Parameters:         Physical Exam   Constitutional: She is oriented to person, place, and time. She appears well-developed and well-nourished.   HENT:   Mouth/Throat: Oropharynx is clear and  moist.   Eyes: EOM are normal.   Neck: No JVD present.   Cardiovascular: Normal rate, regular rhythm and intact distal pulses.   Smooth VAD hum   Pulmonary/Chest: Effort normal and breath sounds normal. No respiratory distress. She has no rales.   Abdominal: Soft. Bowel sounds are normal. She exhibits no distension. There is no tenderness. There is no guarding.   Musculoskeletal: She exhibits no edema.   Neurological: She is alert and oriented to person, place, and time.   Skin: Skin is warm.   Psychiatric: She has a normal mood and affect.   Nursing note and vitals reviewed.      Significant Labs:  CBC:  Recent Labs   Lab 12/04/19 0356 12/05/19 0332 12/06/19 0316   WBC 5.34 5.71 6.12   RBC 4.09 4.13 4.15   HGB 10.0* 9.9* 10.1*   HCT 34.8* 34.8* 35.4*    314 308   MCV 85 84 85   MCH 24.4* 24.0* 24.3*   MCHC 28.7* 28.4* 28.5*     BNP:  Recent Labs   Lab 12/02/19 0523 12/04/19 0356 12/06/19 0316   * 298* 231*     CMP:  Recent Labs   Lab 12/02/19 0523 12/04/19 0356 12/05/19 0332 12/06/19 0316   *   < > 128* 132* 129*   CALCIUM 9.2   < > 9.2 9.4 9.2   ALBUMIN 3.3*  --  3.3*  --  3.3*   PROT 6.7  --  6.6  --  6.6      < > 140 140 140   K 5.1   < > 4.4 3.8 4.0   CO2 24   < > 27 26 28      < > 101 100 101   BUN 30*   < > 33* 28* 28*   CREATININE 1.7*   < > 2.0* 1.9* 1.9*   ALKPHOS 92  --  85  --  91   ALT 14  --  12  --  14   AST 19  --  16  --  19   BILITOT 0.3  --  0.4  --  0.3    < > = values in this interval not displayed.      Coagulation:   Recent Labs   Lab 12/04/19 0356 12/05/19 0332 12/06/19 0316   INR 2.5* 2.1* 1.9*   APTT 38.0* 33.1* 33.9*     LDH:  No results for input(s): LDH in the last 72 hours.  Microbiology:  Microbiology Results (last 7 days)     ** No results found for the last 168 hours. **          I have reviewed all pertinent labs within the past 24 hours.    Estimated Creatinine Clearance: 44.4 mL/min (A) (based on SCr of 1.9 mg/dL (H)).    Diagnostic  Results:  I have reviewed and interpreted all pertinent imaging results/findings within the past 24 hours.

## 2019-12-06 NOTE — ASSESSMENT & PLAN NOTE
- HeartMate 3 Implanted 9/10/19 as DT.  - Implanted by Dr. Walden  - INR  theraputic Dose coumadin per Pharmacist.  - Antiplatelets:  mg.  - LDH is stable Will monitor daily.   - Speed set at 5100  - Epi @ .02, NO @ 20.   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 Urgent selection 11/26, approved for transplant. Listed today tier 2.    Procedure: Device Interrogation Including analysis of device parameters  Current Settings: Ventricular Assist Device  Review of device function is stable    TXP LVAD INTERROGATIONS 12/6/2019 12/6/2019 12/6/2019 12/6/2019 12/6/2019 12/6/2019 12/6/2019   Type HeartMate3 HeartMate3 HeartMate3 HeartMate3 HeartMate3 HeartMate3 HeartMate3   Flow 4.1 4.0 4.0 4.2 4.1 4.3 4.0   Speed 5100 5100 5100 5100 5100 5100 5100   PI 3.7 3.8 3.7 2.7 3.3 2.8 3.5   Power (Orellana) 3.2 3.3 3.5 3.5 3.6 3.5 3.5   LSL 4700 4700 - - - - -   Pulsatility Intermittent pulse Intermittent pulse Intermittent pulse Intermittent pulse Intermittent pulse Intermittent pulse Intermittent pulse

## 2019-12-06 NOTE — PLAN OF CARE
No acute events today. Pt AAOx4, moves everything spontaneously. Pupils equal and reactive. Paced rhythm on monitor. HM3 in place, speed 5100. No alarms on shift. Dressing changed. TLC placed to [facility] for CVP and SVO2 monitoring. CVP 10, SVO2 54. No changes at this time. SVO2 daily. Epi remains @ 0.02 mg/kg/min. Nitric increased today to 20 ppm with 4 L NC. One time dose lasix 80 administered.No right heart cath today. POC updated with pt and pt's mom at bedside, all questions and concerns addressed.   CMICU DAILY GOALS       A: Awake    RASS: Goal - RASS Goal: 0-->alert and calm  Actual - RASS (Patel Agitation-Sedation Scale): 0-->alert and calm   Restraint necessity:    B: Breath   SBT: Not intubated   C: Coordinate A & B, analgesics/sedatives   Pain: managed    SAT: Not intubated  D: Delirium   CAM-ICU: Overall CAM-ICU: Negative  E: Early Mobility   MOVE Screen: Pass   Activity: Activity Management: activity adjusted per tolerance, activity clustered for rest period  FAS: Feeding/Nutrition   Diet order: Diet/Nutrition Received: low saturated fat/low cholesterol, 2 gram sodium,   Fluid restriction: Fluid Requirement: 1500 cc FR  T: Thrombus   DVT prophylaxis: VTE Required Core Measure: Pharmacological prophylaxis initiated/maintained  H: HOB Elevation   Head of Bed (HOB): HOB at 30 degrees  U: Ulcer Prophylaxis   GI: yes  G: Glucose control   managed    S: Skin   Bundle compliance: yes   Bathing/Skin Care: bath, complete, bath, chlorhexidine, back care, dressed/undressed, linen changed Date: 12/5/19 (night bath)  B: Bowel Function   no issues   I: Indwelling Catheters   Fonseca necessity:     CVC necessity: Yes   IPAD offered: Yes  D: De-escalation Antibx   N/A  Plan for the day   Place TLC for cardiac monitoring.   Family/Goals of care/Code Status   Code Status: Prior     No acute events throughout day, VS and assessment per flow sheet, patient progressing towards goals as tolerated, plan of care reviewed with  Deborah Navas and family, all concerns addressed, will continue to monitor.

## 2019-12-06 NOTE — PROGRESS NOTES
[facility]  Heart Transplant  Progress Note    Patient Name: Deborah Navas  MRN: [id number]  Admission Date: 11/13/2019  Hospital Length of Stay: 21 days  Attending Physician: Vi Bryant MD  Primary Care Provider: Primary Doctor No  Principal Problem:Left ventricular assist device (LVAD) complication    Subjective:     Interval History: patient financially cleared and listed status 2  for OHT    Continuous Infusions:   epinephrine 0.02 mcg/kg/min (12/06/19 0800)    nitric oxide gas       Scheduled Meds:   amiodarone  400 mg Oral BID    aspirin  325 mg Oral Daily    furosemide  80 mg Oral Daily    gabapentin  200 mg Oral BID    hepatitis A virus vaccine (PF)  1,440 Units Intramuscular Once    magnesium oxide  400 mg Oral Daily    mexiletine  200 mg Oral Q8H    mirtazapine  15 mg Oral QHS    pantoprazole  40 mg Oral BID AC    polyethylene glycol  17 g Oral Daily    senna-docusate 8.6-50 mg  1 tablet Oral BID    sucralfate  1 g Oral QID (AC & HS)    venlafaxine  150 mg Oral Daily    warfarin  3 mg Oral Daily     PRN Meds:ALPRAZolam, pneumoc 13-amber conj-dip cr(PF), promethazine, sodium chloride, white petrolatum, zolpidem    Review of patient's allergies indicates:   Allergen Reactions    Adhesive Blisters     Reaction to area in chest and up only    Codeine Itching     Objective:     Vital Signs (Most Recent):  Temp: 97.9 °F (36.6 °C) (12/06/19 0715)  Pulse: 78 (12/06/19 0800)  Resp: 16 (12/06/19 0800)  BP: (!) 76/0 (12/06/19 0715)  SpO2: 98 % (12/06/19 0405) Vital Signs (24h Range):  Temp:  [97.6 °F (36.4 °C)-98.5 °F (36.9 °C)] 97.9 °F (36.6 °C)  Pulse:  [] 78  Resp:  [16-52] 16  SpO2:  [97 %-100 %] 98 %  BP: (70-78)/(0) 76/0     Patient Vitals for the past 72 hrs (Last 3 readings):   Weight   12/06/19 0400 106.1 kg (233 lb 14.5 oz)   12/04/19 1300 103.9 kg (229 lb)   12/04/19 0300 104.1 kg (229 lb 8 oz)     Body mass index is 36.64 kg/m².      Intake/Output  Summary (Last 24 hours) at 12/6/2019 0832  Last data filed at 12/6/2019 0800  Gross per 24 hour   Intake 1182 ml   Output 1800 ml   Net -618 ml       Hemodynamic Parameters:         Physical Exam   Constitutional: She is oriented to person, place, and time. She appears well-developed and well-nourished.   HENT:   Mouth/Throat: Oropharynx is clear and moist.   Eyes: EOM are normal.   Neck: No JVD present.   Cardiovascular: Normal rate, regular rhythm and intact distal pulses.   Smooth VAD hum   Pulmonary/Chest: Effort normal and breath sounds normal. No respiratory distress. She has no rales.   Abdominal: Soft. Bowel sounds are normal. She exhibits no distension. There is no tenderness. There is no guarding.   Musculoskeletal: She exhibits no edema.   Neurological: She is alert and oriented to person, place, and time.   Skin: Skin is warm.   Psychiatric: She has a normal mood and affect.   Nursing note and vitals reviewed.      Significant Labs:  CBC:  Recent Labs   Lab 12/04/19 0356 12/05/19 0332 12/06/19 0316   WBC 5.34 5.71 6.12   RBC 4.09 4.13 4.15   HGB 10.0* 9.9* 10.1*   HCT 34.8* 34.8* 35.4*    314 308   MCV 85 84 85   MCH 24.4* 24.0* 24.3*   MCHC 28.7* 28.4* 28.5*     BNP:  Recent Labs   Lab 12/02/19 0523 12/04/19  0356 12/06/19 0316   * 298* 231*     CMP:  Recent Labs   Lab 12/02/19  0523 12/04/19  0356 12/05/19  0332 12/06/19  0316   *   < > 128* 132* 129*   CALCIUM 9.2   < > 9.2 9.4 9.2   ALBUMIN 3.3*  --  3.3*  --  3.3*   PROT 6.7  --  6.6  --  6.6      < > 140 140 140   K 5.1   < > 4.4 3.8 4.0   CO2 24   < > 27 26 28      < > 101 100 101   BUN 30*   < > 33* 28* 28*   CREATININE 1.7*   < > 2.0* 1.9* 1.9*   ALKPHOS 92  --  85  --  91   ALT 14  --  12  --  14   AST 19  --  16  --  19   BILITOT 0.3  --  0.4  --  0.3    < > = values in this interval not displayed.      Coagulation:   Recent Labs   Lab 12/04/19  0356 12/05/19  0332 12/06/19  0316   INR 2.5* 2.1* 1.9*    APTT 38.0* 33.1* 33.9*     LDH:  No results for input(s): LDH in the last 72 hours.  Microbiology:  Microbiology Results (last 7 days)     ** No results found for the last 168 hours. **          I have reviewed all pertinent labs within the past 24 hours.    Estimated Creatinine Clearance: 44.4 mL/min (A) (based on SCr of 1.9 mg/dL (H)).    Diagnostic Results:  I have reviewed and interpreted all pertinent imaging results/findings within the past 24 hours.    Assessment and Plan:     48 yo WF with NICM, s/p HM3 implantation 9/10/19 (post up coarse uncomplicated with the exception of+ diaphragmatic stimulation of LV lead and pleural effusion with chest tube placement, atrial flutter with NADINE/DCCV), V-fib reported in setting of hypokalemia with appropriate shock from ICD on Amiodarone prior to LVAD placement; and recent hospitalizations for ventricular arrythmias; started on Mexilitine.  She was seen in EP clinic today and she continues to have multiple VT episodes with some ATP therapy, although no ICD shocks since starting mexiletine 10/24/2019. One slow VT episode 2.5hrs 11/3/2019. Patient asymptomatic with LVAD. On coumadin. No AFL since post-op event (paced out of rhythm 9/24/2019). CHB with 100% V pacing (LV lead off). She does not feel well. Dry heaving with mexiletine. Taking phenergan PRN. 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   She was seen in HTS clinic and reported 4 low flow alarms the last 4 nights.  She reports they occur in her sleep and last for only a few seconds.  By the time she wakes up; they quickly stop.  She does report to possibly being little volume overloaded.  She hasn't noticed weight gain at home but thinks she may have little extra fluid.  She denies SOB, chest pain, palpitations, LEGER. In review of her records: she was 223lbs on 10/24/19 during previous hospitalization and is 235lbs now.  Her lasix had previously been decreased to prn.     * Left  ventricular assist device (LVAD) complication  - hx low flow alarms prior to admit, last LFA 12/1  - Possibly secondary to ADHF--> RV failure  - Formal report of CT unremarkable; however, it was a non contrast as creat was elevated above baseline on admit and inflow and outflow cannulas not accurately visualized    GLO (acute kidney injury)  - see CKD    Organ transplant candidate  - will list with exemption due to VT with ICD shocks and RV failure (will not tolerate ) and does not tolerate NO wean  - approved for transplant on 11/26. Tier 2.     Essential hypertension  -Patient is non-pulsatile  -Doppler goal 60-90 mmHg, currently controlled  -Antihypertensive medications include: d/c ace and aldactone today      Ventricular tachycardia  - Per EP office visit on day of admission: She continues to have multiple VT episodes with some ATP therapy, although no ICD shocks since starting mexiletine 10/24/2019. One slow VT episode 2.5hrs 11/3/2019. Patient asymptomatic with LVAD. On coumadin. No AFL since post-op event (paced out of rhythm 9/24/2019). CHB with 100% V pacing (LV lead off).   - Plan was to continue current regimen despite Mexiletine making her nauseous.  - Decreased Amiodarone to daily per EP plan on discharge last hospitalization   - Shocked x 3 11/25 for MMVT (in addition has had numerous successful ATP's for MMVT)   - S/P amio load   - now on Amio 400 BID and mexiletine increased to 200    LVAD (left ventricular assist device) present  - HeartMate 3 Implanted 9/10/19 as DT.  - Implanted by Dr. Walden  - INR  theraputic Dose coumadin per Pharmacist.  - Antiplatelets:  mg.  - LDH is stable Will monitor daily.   - Speed set at 5100  - Epi @ .02, NO @ 20.   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 Urgent selection 11/26, approved for transplant. Listed today tier 2.    Procedure: Device Interrogation Including analysis of device parameters  Current Settings: Ventricular Assist  Device  Review of device function is stable    TXP LVAD INTERROGATIONS 12/6/2019 12/6/2019 12/6/2019 12/6/2019 12/6/2019 12/6/2019 12/6/2019   Type HeartMate3 HeartMate3 HeartMate3 HeartMate3 HeartMate3 HeartMate3 HeartMate3   Flow 4.1 4.0 4.0 4.2 4.1 4.3 4.0   Speed 5100 5100 5100 5100 5100 5100 5100   PI 3.7 3.8 3.7 2.7 3.3 2.8 3.5   Power (Orellana) 3.2 3.3 3.5 3.5 3.6 3.5 3.5   LSL 4700 4700 - - - - -   Pulsatility Intermittent pulse Intermittent pulse Intermittent pulse Intermittent pulse Intermittent pulse Intermittent pulse Intermittent pulse       CKD (chronic kidney disease)  - Baseline creat appears 1.3-1.6. 1.9 today.  - Will monitor with diuresis     Acute on chronic combined systolic and diastolic heart failure  -NICM  -Last 2D Echo 11/27/19. Severe TR, IVC 15, mild MR, AV does not open, LVedd 5.4cm @ 5200. Image 72,74, cannot exclude thrombus attached to lead in right atrium.  - on lasix 80 PO QD with PRN IVP (watchign CVP and Creatinine).    - EPI .02, NO @ increased to 20   - GDMT: D/C ACE 10 BID,  & aldactone   - was on lasix 20 PRN at home PTA  -2g Na dietary restriction, 1500 mL fluid restriction, strict I/Os      ICD (implantable cardioverter-defibrillator) in place  - Medtronic ICD  - Shocked x 3 11/25 for MMVT   - Loaded with IV amio on 11/25. Now on amio 400 PO BID and mexiletine increased to 200 QD   - See above VT    Uninterrupted Critical Care/Counseling Time (not including procedures): 30 minutes      ZAC Houston  Heart Transplant  [facility]

## 2019-12-06 NOTE — PT/OT/SLP PROGRESS
Physical Therapy      Patient Name:  Deborah Navas   MRN:  [id number]    Patient not seen today secondary to (pt on hold 2nd to decreased flows with movent and going into v-tach with movement. ). Will follow-up when medically appropriate. .    Breann Apodaca, PT   12/6/2019

## 2019-12-07 LAB
ALLENS TEST: ABNORMAL
ANION GAP SERPL CALC-SCNC: 12 MMOL/L (ref 8–16)
APTT BLDCRRT: 33.7 SEC (ref 21–32)
BASOPHILS # BLD AUTO: 0.04 K/UL (ref 0–0.2)
BASOPHILS NFR BLD: 0.7 % (ref 0–1.9)
BUN SERPL-MCNC: 26 MG/DL (ref 6–20)
CALCIUM SERPL-MCNC: 9.2 MG/DL (ref 8.7–10.5)
CHLORIDE SERPL-SCNC: 100 MMOL/L (ref 95–110)
CO2 SERPL-SCNC: 28 MMOL/L (ref 23–29)
CREAT SERPL-MCNC: 1.8 MG/DL (ref 0.5–1.4)
DELSYS: ABNORMAL
DIFFERENTIAL METHOD: ABNORMAL
EOSINOPHIL # BLD AUTO: 0.2 K/UL (ref 0–0.5)
EOSINOPHIL NFR BLD: 2.8 % (ref 0–8)
ERYTHROCYTE [DISTWIDTH] IN BLOOD BY AUTOMATED COUNT: 16.8 % (ref 11.5–14.5)
EST. GFR  (AFRICAN AMERICAN): 37.3 ML/MIN/1.73 M^2
EST. GFR  (NON AFRICAN AMERICAN): 32.4 ML/MIN/1.73 M^2
FLOW: 4
GLUCOSE SERPL-MCNC: 133 MG/DL (ref 70–110)
HCO3 UR-SCNC: 30.2 MMOL/L (ref 24–28)
HCO3 UR-SCNC: 30.6 MMOL/L (ref 24–28)
HCO3 UR-SCNC: 31.4 MMOL/L (ref 24–28)
HCT VFR BLD AUTO: 34.7 % (ref 37–48.5)
HGB BLD-MCNC: 10.3 G/DL (ref 12–16)
IMM GRANULOCYTES # BLD AUTO: 0.01 K/UL (ref 0–0.04)
IMM GRANULOCYTES NFR BLD AUTO: 0.2 % (ref 0–0.5)
INR PPP: 2.4 (ref 0.8–1.2)
LYMPHOCYTES # BLD AUTO: 1.3 K/UL (ref 1–4.8)
LYMPHOCYTES NFR BLD: 22.6 % (ref 18–48)
MAGNESIUM SERPL-MCNC: 2.3 MG/DL (ref 1.6–2.6)
MCH RBC QN AUTO: 25.2 PG (ref 27–31)
MCHC RBC AUTO-ENTMCNC: 29.7 G/DL (ref 32–36)
MCV RBC AUTO: 85 FL (ref 82–98)
METHEMOGLOBIN: 0.6 % (ref 0–3)
MODE: ABNORMAL
MONOCYTES # BLD AUTO: 0.5 K/UL (ref 0.3–1)
MONOCYTES NFR BLD: 8.7 % (ref 4–15)
NEUTROPHILS # BLD AUTO: 3.7 K/UL (ref 1.8–7.7)
NEUTROPHILS NFR BLD: 65 % (ref 38–73)
NRBC BLD-RTO: 0 /100 WBC
PCO2 BLDA: 49.6 MMHG (ref 35–45)
PCO2 BLDA: 50.3 MMHG (ref 35–45)
PCO2 BLDA: 55.5 MMHG (ref 35–45)
PH SMN: 7.36 [PH] (ref 7.35–7.45)
PH SMN: 7.39 [PH] (ref 7.35–7.45)
PH SMN: 7.39 [PH] (ref 7.35–7.45)
PHOSPHATE SERPL-MCNC: 3.9 MG/DL (ref 2.7–4.5)
PLATELET # BLD AUTO: 313 K/UL (ref 150–350)
PMV BLD AUTO: 9.9 FL (ref 9.2–12.9)
PO2 BLDA: 30 MMHG (ref 40–60)
PO2 BLDA: 33 MMHG (ref 40–60)
PO2 BLDA: 35 MMHG (ref 40–60)
POC BE: 5 MMOL/L
POC BE: 6 MMOL/L
POC BE: 6 MMOL/L
POC SATURATED O2: 55 % (ref 95–100)
POC SATURATED O2: 60 % (ref 95–100)
POC SATURATED O2: 66 % (ref 95–100)
POC TCO2: 32 MMOL/L (ref 24–29)
POC TCO2: 32 MMOL/L (ref 24–29)
POC TCO2: 33 MMOL/L (ref 24–29)
POTASSIUM SERPL-SCNC: 3.4 MMOL/L (ref 3.5–5.1)
PROTHROMBIN TIME: 22.8 SEC (ref 9–12.5)
RBC # BLD AUTO: 4.08 M/UL (ref 4–5.4)
SAMPLE: ABNORMAL
SITE: ABNORMAL
SODIUM SERPL-SCNC: 140 MMOL/L (ref 136–145)
WBC # BLD AUTO: 5.62 K/UL (ref 3.9–12.7)

## 2019-12-07 PROCEDURE — 99291 CRITICAL CARE FIRST HOUR: CPT | Mod: NTX,,, | Performed by: PHYSICIAN ASSISTANT

## 2019-12-07 PROCEDURE — 27000248 HC VAD-ADDITIONAL DAY: Mod: NTX

## 2019-12-07 PROCEDURE — 99900035 HC TECH TIME PER 15 MIN (STAT): Mod: NTX

## 2019-12-07 PROCEDURE — 93750 INTERROGATION VAD IN PERSON: CPT | Mod: NTX,,, | Performed by: INTERNAL MEDICINE

## 2019-12-07 PROCEDURE — 25000003 PHARM REV CODE 250: Mod: NTX | Performed by: PHYSICIAN ASSISTANT

## 2019-12-07 PROCEDURE — 85025 COMPLETE CBC W/AUTO DIFF WBC: CPT | Mod: NTX

## 2019-12-07 PROCEDURE — 63600367 HC NITRIC OXIDE PER HOUR: Mod: NTX

## 2019-12-07 PROCEDURE — 85730 THROMBOPLASTIN TIME PARTIAL: CPT | Mod: NTX

## 2019-12-07 PROCEDURE — 82803 BLOOD GASES ANY COMBINATION: CPT | Mod: NTX

## 2019-12-07 PROCEDURE — 80048 BASIC METABOLIC PNL TOTAL CA: CPT | Mod: NTX

## 2019-12-07 PROCEDURE — 25000003 PHARM REV CODE 250: Mod: NTX | Performed by: STUDENT IN AN ORGANIZED HEALTH CARE EDUCATION/TRAINING PROGRAM

## 2019-12-07 PROCEDURE — 99291 PR CRITICAL CARE, E/M 30-74 MINUTES: ICD-10-PCS | Mod: NTX,,, | Performed by: PHYSICIAN ASSISTANT

## 2019-12-07 PROCEDURE — 85610 PROTHROMBIN TIME: CPT | Mod: NTX

## 2019-12-07 PROCEDURE — 63600175 PHARM REV CODE 636 W HCPCS: Mod: NTX | Performed by: STUDENT IN AN ORGANIZED HEALTH CARE EDUCATION/TRAINING PROGRAM

## 2019-12-07 PROCEDURE — 93750 PR INTERROGATE VENT ASSIST DEV, IN PERSON, W PHYSICIAN ANALYSIS: ICD-10-PCS | Mod: NTX,,, | Performed by: INTERNAL MEDICINE

## 2019-12-07 PROCEDURE — 83050 HGB METHEMOGLOBIN QUAN: CPT | Mod: NTX

## 2019-12-07 PROCEDURE — 27000221 HC OXYGEN, UP TO 24 HOURS: Mod: NTX

## 2019-12-07 PROCEDURE — 94761 N-INVAS EAR/PLS OXIMETRY MLT: CPT | Mod: NTX

## 2019-12-07 PROCEDURE — 63600175 PHARM REV CODE 636 W HCPCS: Mod: NTX | Performed by: PHYSICIAN ASSISTANT

## 2019-12-07 PROCEDURE — 84100 ASSAY OF PHOSPHORUS: CPT | Mod: NTX

## 2019-12-07 PROCEDURE — 83735 ASSAY OF MAGNESIUM: CPT | Mod: NTX

## 2019-12-07 PROCEDURE — 20000000 HC ICU ROOM: Mod: NTX

## 2019-12-07 RX ORDER — FUROSEMIDE 10 MG/ML
60 INJECTION INTRAMUSCULAR; INTRAVENOUS 2 TIMES DAILY
Status: DISCONTINUED | OUTPATIENT
Start: 2019-12-07 | End: 2019-12-07

## 2019-12-07 RX ORDER — WARFARIN 2.5 MG/1
2.5 TABLET ORAL DAILY
Status: DISCONTINUED | OUTPATIENT
Start: 2019-12-07 | End: 2019-12-09

## 2019-12-07 RX ORDER — FUROSEMIDE 10 MG/ML
80 INJECTION INTRAMUSCULAR; INTRAVENOUS 3 TIMES DAILY
Status: DISCONTINUED | OUTPATIENT
Start: 2019-12-07 | End: 2019-12-15

## 2019-12-07 RX ORDER — FUROSEMIDE 10 MG/ML
80 INJECTION INTRAMUSCULAR; INTRAVENOUS ONCE
Status: COMPLETED | OUTPATIENT
Start: 2019-12-07 | End: 2019-12-07

## 2019-12-07 RX ORDER — POTASSIUM CHLORIDE 20 MEQ/1
40 TABLET, EXTENDED RELEASE ORAL ONCE
Status: COMPLETED | OUTPATIENT
Start: 2019-12-07 | End: 2019-12-07

## 2019-12-07 RX ORDER — POTASSIUM CHLORIDE 20 MEQ/1
40 TABLET, EXTENDED RELEASE ORAL DAILY
Status: DISCONTINUED | OUTPATIENT
Start: 2019-12-07 | End: 2019-12-08

## 2019-12-07 RX ADMIN — PANTOPRAZOLE SODIUM 40 MG: 40 TABLET, DELAYED RELEASE ORAL at 06:12

## 2019-12-07 RX ADMIN — AMIODARONE HYDROCHLORIDE 400 MG: 200 TABLET ORAL at 08:12

## 2019-12-07 RX ADMIN — ZOLPIDEM TARTRATE 5 MG: 5 TABLET ORAL at 10:12

## 2019-12-07 RX ADMIN — ALUMINUM HYDROXIDE, MAGNESIUM HYDROXIDE, AND SIMETHICONE 50 ML: 200; 200; 20 SUSPENSION ORAL at 06:12

## 2019-12-07 RX ADMIN — VENLAFAXINE 150 MG: 37.5 TABLET ORAL at 09:12

## 2019-12-07 RX ADMIN — SUCRALFATE 1 G: 1 SUSPENSION ORAL at 03:12

## 2019-12-07 RX ADMIN — MEXILETINE HYDROCHLORIDE 200 MG: 200 CAPSULE ORAL at 06:12

## 2019-12-07 RX ADMIN — POTASSIUM CHLORIDE 40 MEQ: 1500 TABLET, EXTENDED RELEASE ORAL at 06:12

## 2019-12-07 RX ADMIN — EPINEPHRINE 0.02 MCG/KG/MIN: 1 INJECTION PARENTERAL at 11:12

## 2019-12-07 RX ADMIN — MEXILETINE HYDROCHLORIDE 200 MG: 200 CAPSULE ORAL at 02:12

## 2019-12-07 RX ADMIN — GABAPENTIN 200 MG: 100 CAPSULE ORAL at 09:12

## 2019-12-07 RX ADMIN — POTASSIUM CHLORIDE 40 MEQ: 1500 TABLET, EXTENDED RELEASE ORAL at 09:12

## 2019-12-07 RX ADMIN — PANTOPRAZOLE SODIUM 40 MG: 40 TABLET, DELAYED RELEASE ORAL at 03:12

## 2019-12-07 RX ADMIN — FUROSEMIDE 80 MG: 10 INJECTION, SOLUTION INTRAMUSCULAR; INTRAVENOUS at 12:12

## 2019-12-07 RX ADMIN — ASPIRIN 325 MG: 325 TABLET, DELAYED RELEASE ORAL at 09:12

## 2019-12-07 RX ADMIN — GABAPENTIN 200 MG: 100 CAPSULE ORAL at 08:12

## 2019-12-07 RX ADMIN — SENNOSIDES AND DOCUSATE SODIUM 1 TABLET: 8.6; 5 TABLET ORAL at 09:12

## 2019-12-07 RX ADMIN — WARFARIN SODIUM 2.5 MG: 2.5 TABLET ORAL at 05:12

## 2019-12-07 RX ADMIN — SUCRALFATE 1 G: 1 SUSPENSION ORAL at 06:12

## 2019-12-07 RX ADMIN — SENNOSIDES AND DOCUSATE SODIUM 1 TABLET: 8.6; 5 TABLET ORAL at 08:12

## 2019-12-07 RX ADMIN — FUROSEMIDE 60 MG: 10 INJECTION, SOLUTION INTRAMUSCULAR; INTRAVENOUS at 09:12

## 2019-12-07 RX ADMIN — FUROSEMIDE 80 MG: 10 INJECTION, SOLUTION INTRAMUSCULAR; INTRAVENOUS at 06:12

## 2019-12-07 RX ADMIN — POLYETHYLENE GLYCOL 3350 17 G: 17 POWDER, FOR SOLUTION ORAL at 09:12

## 2019-12-07 RX ADMIN — FUROSEMIDE 80 MG: 10 INJECTION, SOLUTION INTRAMUSCULAR; INTRAVENOUS at 08:12

## 2019-12-07 RX ADMIN — MAGNESIUM OXIDE TAB 400 MG (241.3 MG ELEMENTAL MG) 400 MG: 400 (241.3 MG) TAB at 09:12

## 2019-12-07 RX ADMIN — PROMETHAZINE HYDROCHLORIDE 12.5 MG: 12.5 TABLET ORAL at 06:12

## 2019-12-07 RX ADMIN — MEXILETINE HYDROCHLORIDE 200 MG: 200 CAPSULE ORAL at 10:12

## 2019-12-07 RX ADMIN — ZOLPIDEM TARTRATE 5 MG: 5 TABLET ORAL at 08:12

## 2019-12-07 RX ADMIN — AMIODARONE HYDROCHLORIDE 400 MG: 200 TABLET ORAL at 09:12

## 2019-12-07 RX ADMIN — MIRTAZAPINE 15 MG: 15 TABLET, FILM COATED ORAL at 08:12

## 2019-12-07 NOTE — PLAN OF CARE
CMICU DAILY GOALS       A: Awake    RASS: Goal - RASS Goal: 0-->alert and calm  Actual - RASS (Patel Agitation-Sedation Scale): 0-->alert and calm   Restraint necessity:    B: Breath   SBT: NA   C: Coordinate A & B, analgesics/sedatives   Pain: NA     SAT: NA  D: Delirium   CAM-ICU: Overall CAM-ICU: Negative  E: Early Mobility   MOVE Screen: NA    Activity: Activity Management: activity adjusted per tolerance, activity clustered for rest period  FAS: Feeding/Nutrition   Diet order: Diet/Nutrition Received: restrict fluids, low saturated fat/low cholesterol, 2 gram sodium,   Fluid restriction: Fluid Requirement: 2L FR  T: Thrombus   DVT prophylaxis: VTE Required Core Measure: Pharmacological prophylaxis initiated/maintained  H: HOB Elevation   Head of Bed (HOB): HOB at 30-45 degrees  U: Ulcer Prophylaxis   GI: yes  G: Glucose control   managed    S: Skin   Bundle compliance: yes   Bathing/Skin Care: back care, bath, chlorhexidine, bath, complete, linen changed, dressed/undressed Date:    no issues   I: Indwelling Catheters   Fonseca necessity:     CVC necessity: No   IPAD offered: No  D: De-escalation Antibx   No  Plan for the day   Continue current treatment; monitor CVP and SV02  Family/Goals of care/Code Status   Code Status: Prior     No acute events throughout day, VS and assessment per flow sheet, patient progressing towards goals as tolerated, plan of care reviewed with Deborah Navas and family, all concerns addressed, will continue to monitor.

## 2019-12-07 NOTE — PROGRESS NOTES
[facility]  Heart Transplant  Progress Note    Patient Name: Deborah Navas  MRN: [id number]  Admission Date: 11/13/2019  Hospital Length of Stay: 22 days  Attending Physician: Vi Bryant MD  Primary Care Provider: Primary Doctor No  Principal Problem:Left ventricular assist device (LVAD) complication    Subjective:     Interval History: given dose of IV lasix with good urine output, CVP 14     Continuous Infusions:   epinephrine 0.02 mcg/kg/min (12/07/19 0800)    nitric oxide gas       Scheduled Meds:   amiodarone  400 mg Oral BID    aspirin  325 mg Oral Daily    furosemide  80 mg Oral Daily    gabapentin  200 mg Oral BID    hepatitis A virus vaccine (PF)  1,440 Units Intramuscular Once    magnesium oxide  400 mg Oral Daily    mexiletine  200 mg Oral Q8H    mirtazapine  15 mg Oral QHS    pantoprazole  40 mg Oral BID AC    polyethylene glycol  17 g Oral Daily    senna-docusate 8.6-50 mg  1 tablet Oral BID    sucralfate  1 g Oral QID (AC & HS)    venlafaxine  150 mg Oral Daily    warfarin  4 mg Oral Daily     PRN Meds:ALPRAZolam, pneumoc 13-amber conj-dip cr(PF), promethazine, sodium chloride, white petrolatum, zolpidem    Review of patient's allergies indicates:   Allergen Reactions    Adhesive Blisters     Reaction to area in chest and up only    Codeine Itching     Objective:     Vital Signs (Most Recent):  Temp: 97.4 °F (36.3 °C) (12/07/19 0300)  Pulse: 83 (12/07/19 0801)  Resp: 18 (12/07/19 0801)  BP: (!) 80/0 (12/07/19 0701)  SpO2: 100 % (12/07/19 0300) Vital Signs (24h Range):  Temp:  [97 °F (36.1 °C)-98.3 °F (36.8 °C)] 97.4 °F (36.3 °C)  Pulse:  [80-97] 83  Resp:  [16-57] 18  SpO2:  [98 %-100 %] 100 %  BP: (70-80)/(0) 80/0     Patient Vitals for the past 72 hrs (Last 3 readings):   Weight   12/07/19 0500 104 kg (229 lb 4.5 oz)   12/06/19 1100 103.6 kg (228 lb 6.3 oz)   12/06/19 0400 106.1 kg (233 lb 14.5 oz)     Body mass index is 36.18  kg/m².      Intake/Output Summary (Last 24 hours) at 12/7/2019 0854  Last data filed at 12/7/2019 0800  Gross per 24 hour   Intake 384 ml   Output 3500 ml   Net -3116 ml       Hemodynamic Parameters:  CVP:  [12 mmHg-14 mmHg] 14 mmHg        Physical Exam   Constitutional: She is oriented to person, place, and time. She appears well-developed and well-nourished.   HENT:   Mouth/Throat: Oropharynx is clear and moist.   Eyes: EOM are normal.   Neck: No JVD present.   Cardiovascular: Normal rate, regular rhythm and intact distal pulses.   Smooth VAD hum   Pulmonary/Chest: Effort normal and breath sounds normal. No respiratory distress. She has no rales.   Abdominal: Soft. Bowel sounds are normal. She exhibits no distension. There is no tenderness. There is no guarding.   Musculoskeletal: She exhibits no edema.   Neurological: She is alert and oriented to person, place, and time.   Skin: Skin is warm.   Psychiatric: She has a normal mood and affect.   Nursing note and vitals reviewed.      Significant Labs:  CBC:  Recent Labs   Lab 12/05/19 0332 12/06/19 0316 12/07/19 0323   WBC 5.71 6.12 5.62   RBC 4.13 4.15 4.08   HGB 9.9* 10.1* 10.3*   HCT 34.8* 35.4* 34.7*    308 313   MCV 84 85 85   MCH 24.0* 24.3* 25.2*   MCHC 28.4* 28.5* 29.7*     BNP:  Recent Labs   Lab 12/02/19 0523 12/04/19  0356 12/06/19 0316   * 298* 231*     CMP:  Recent Labs   Lab 12/02/19 0523 12/04/19  0356 12/05/19  0332 12/06/19 0316 12/07/19  0323   *   < > 128* 132* 129* 133*   CALCIUM 9.2   < > 9.2 9.4 9.2 9.2   ALBUMIN 3.3*  --  3.3*  --  3.3*  --    PROT 6.7  --  6.6  --  6.6  --       < > 140 140 140 140   K 5.1   < > 4.4 3.8 4.0 3.4*   CO2 24   < > 27 26 28 28      < > 101 100 101 100   BUN 30*   < > 33* 28* 28* 26*   CREATININE 1.7*   < > 2.0* 1.9* 1.9* 1.8*   ALKPHOS 92  --  85  --  91  --    ALT 14  --  12  --  14  --    AST 19  --  16  --  19  --    BILITOT 0.3  --  0.4  --  0.3  --     < > = values in  this interval not displayed.      Coagulation:   Recent Labs   Lab 12/05/19  0332 12/06/19  0316 12/07/19  0323   INR 2.1* 1.9* 2.4*   APTT 33.1* 33.9* 33.7*     LDH:  No results for input(s): LDH in the last 72 hours.  Microbiology:  Microbiology Results (last 7 days)     ** No results found for the last 168 hours. **          I have reviewed all pertinent labs within the past 24 hours.    Estimated Creatinine Clearance: 46.2 mL/min (A) (based on SCr of 1.8 mg/dL (H)).    Diagnostic Results:  I have reviewed and interpreted all pertinent imaging results/findings within the past 24 hours.    Assessment and Plan:     50 yo WF with NICM, s/p HM3 implantation 9/10/19 (post up coarse uncomplicated with the exception of+ diaphragmatic stimulation of LV lead and pleural effusion with chest tube placement, atrial flutter with NADINE/DCCV), V-fib reported in setting of hypokalemia with appropriate shock from ICD on Amiodarone prior to LVAD placement; and recent hospitalizations for ventricular arrythmias; started on Mexilitine.  She was seen in EP clinic today and she continues to have multiple VT episodes with some ATP therapy, although no ICD shocks since starting mexiletine 10/24/2019. One slow VT episode 2.5hrs 11/3/2019. Patient asymptomatic with LVAD. On coumadin. No AFL since post-op event (paced out of rhythm 9/24/2019). CHB with 100% V pacing (LV lead off). She does not feel well. Dry heaving with mexiletine. Taking phenergan PRN. 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   She was seen in HTS clinic and reported 4 low flow alarms the last 4 nights.  She reports they occur in her sleep and last for only a few seconds.  By the time she wakes up; they quickly stop.  She does report to possibly being little volume overloaded.  She hasn't noticed weight gain at home but thinks she may have little extra fluid.  She denies SOB, chest pain, palpitations, LEGER. In review of her records: she  was 223lbs on 10/24/19 during previous hospitalization and is 235lbs now.  Her lasix had previously been decreased to prn.     * Left ventricular assist device (LVAD) complication  - hx low flow alarms prior to admit, last LFA 12/1  - Possibly secondary to ADHF--> RV failure  - Formal report of CT unremarkable; however, it was a non contrast as creat was elevated above baseline on admit and inflow and outflow cannulas not accurately visualized    GLO (acute kidney injury)  - see CKD    Organ transplant candidate  - will list with exemption due to VT with ICD shocks and RV failure (will not tolerate ) and does not tolerate NO wean  - approved for transplant on 11/26. Tier 2.     Essential hypertension  -Patient is non-pulsatile  -Doppler goal 60-90 mmHg, currently controlled  -Antihypertensive medications include: d/c ace and aldactone today      Ventricular tachycardia  - Per EP office visit on day of admission: She continues to have multiple VT episodes with some ATP therapy, although no ICD shocks since starting mexiletine 10/24/2019. One slow VT episode 2.5hrs 11/3/2019. Patient asymptomatic with LVAD. On coumadin. No AFL since post-op event (paced out of rhythm 9/24/2019). CHB with 100% V pacing (LV lead off).   - Plan was to continue current regimen despite Mexiletine making her nauseous.  - Decreased Amiodarone to daily per EP plan on discharge last hospitalization   - Shocked x 3 11/25 for MMVT (in addition has had numerous successful ATP's for MMVT)   - S/P amio load   - now on Amio 400 BID and mexiletine increased to 200    LVAD (left ventricular assist device) present  - HeartMate 3 Implanted 9/10/19 as DT.  - Implanted by Dr. Walden  - INR  theraputic Dose coumadin per Pharmacist.  - Antiplatelets:  mg.  - LDH is stable Will monitor daily.   - Speed set at 5100  - Epi @ .02, NO @ 20.   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 Urgent selection 11/26, approved for transplant. Listed  today tier 2.    Procedure: Device Interrogation Including analysis of device parameters  Current Settings: Ventricular Assist Device  Review of device function is stable    TXP LVAD INTERROGATIONS 12/6/2019 12/6/2019 12/6/2019 12/6/2019 12/6/2019 12/6/2019 12/6/2019   Type HeartMate3 HeartMate3 HeartMate3 HeartMate3 HeartMate3 HeartMate3 HeartMate3   Flow 4.1 4.0 4.0 4.2 4.1 4.3 4.0   Speed 5100 5100 5100 5100 5100 5100 5100   PI 3.7 3.8 3.7 2.7 3.3 2.8 3.5   Power (Orellana) 3.2 3.3 3.5 3.5 3.6 3.5 3.5   LSL 4700 4700 - - - - -   Pulsatility Intermittent pulse Intermittent pulse Intermittent pulse Intermittent pulse Intermittent pulse Intermittent pulse Intermittent pulse       CKD (chronic kidney disease)  - Baseline creat appears 1.3-1.6. 1.9 today.  - Will monitor with diuresis     Acute on chronic combined systolic and diastolic heart failure  -NICM  -Last 2D Echo 11/27/19. Severe TR, IVC 15, mild MR, AV does not open, LVedd 5.4cm @ 5200. Image 72,74, cannot exclude thrombus attached to lead in right atrium.  - IV push lasix today   - EPI .02, NO @ increased to 20   - GDMT: D/C ACE 10 BID,  & aldactone   - was on lasix 20 PRN at home PTA  -2g Na dietary restriction, 1500 mL fluid restriction, strict I/Os      ICD (implantable cardioverter-defibrillator) in place  - Medtronic ICD  - Shocked x 3 11/25 for MMVT   - Loaded with IV amio on 11/25. Now on amio 400 PO BID and mexiletine increased to 200 QD   - See above VT    Uninterrupted Critical Care/Counseling Time (not including procedures): 30 minutes      ZAC Houston  Heart Transplant  [facility]

## 2019-12-07 NOTE — CARE UPDATE
Update HD for Ms. Navas    Objective:   Vitals:    12/06/19 2300   BP: 70/0   Pulse: 97   Resp: (!) 24   Temp: 98.2 °F (36.8 °C)     Hemodynamics:   Parameter  12/6 at AM 12/6 at 2300   CVP 10 15   SvO2 73 55   UOP Uncharted but voided 800 overnight Uncharted but voided ~ 2000 mL since morning    CI  3.15 1.83   CO 6.9 4    1000     Current Heart Failure Medications:  - Epinephrine 0.02 mcg/kg/min for the last couple of days   - Lasix 80 mg daily     Current Mechanical Circulatory Support:  VAD  3    Assessment/Plan:  - Increase in her CVP however her UOP not clearly charted, but overall good UOP  - One dose of Lasix 80 mg IV   - Repeat above hemodynamics PRN  - Plan was discussed with attending staff     Yeyo Young MD  Cardiology Fellow ([email or web address])  Pager: [phone]

## 2019-12-07 NOTE — SUBJECTIVE & OBJECTIVE
Interval History: given dose of IV lasix with good urine output, CVP 14     Continuous Infusions:   epinephrine 0.02 mcg/kg/min (12/07/19 0800)    nitric oxide gas       Scheduled Meds:   amiodarone  400 mg Oral BID    aspirin  325 mg Oral Daily    furosemide  80 mg Oral Daily    gabapentin  200 mg Oral BID    hepatitis A virus vaccine (PF)  1,440 Units Intramuscular Once    magnesium oxide  400 mg Oral Daily    mexiletine  200 mg Oral Q8H    mirtazapine  15 mg Oral QHS    pantoprazole  40 mg Oral BID AC    polyethylene glycol  17 g Oral Daily    senna-docusate 8.6-50 mg  1 tablet Oral BID    sucralfate  1 g Oral QID (AC & HS)    venlafaxine  150 mg Oral Daily    warfarin  4 mg Oral Daily     PRN Meds:ALPRAZolam, pneumoc 13-amber conj-dip cr(PF), promethazine, sodium chloride, white petrolatum, zolpidem    Review of patient's allergies indicates:   Allergen Reactions    Adhesive Blisters     Reaction to area in chest and up only    Codeine Itching     Objective:     Vital Signs (Most Recent):  Temp: 97.4 °F (36.3 °C) (12/07/19 0300)  Pulse: 83 (12/07/19 0801)  Resp: 18 (12/07/19 0801)  BP: (!) 80/0 (12/07/19 0701)  SpO2: 100 % (12/07/19 0300) Vital Signs (24h Range):  Temp:  [97 °F (36.1 °C)-98.3 °F (36.8 °C)] 97.4 °F (36.3 °C)  Pulse:  [80-97] 83  Resp:  [16-57] 18  SpO2:  [98 %-100 %] 100 %  BP: (70-80)/(0) 80/0     Patient Vitals for the past 72 hrs (Last 3 readings):   Weight   12/07/19 0500 104 kg (229 lb 4.5 oz)   12/06/19 1100 103.6 kg (228 lb 6.3 oz)   12/06/19 0400 106.1 kg (233 lb 14.5 oz)     Body mass index is 36.18 kg/m².      Intake/Output Summary (Last 24 hours) at 12/7/2019 0854  Last data filed at 12/7/2019 0800  Gross per 24 hour   Intake 384 ml   Output 3500 ml   Net -3116 ml       Hemodynamic Parameters:  CVP:  [12 mmHg-14 mmHg] 14 mmHg        Physical Exam   Constitutional: She is oriented to person, place, and time. She appears well-developed and well-nourished.   HENT:    Mouth/Throat: Oropharynx is clear and moist.   Eyes: EOM are normal.   Neck: No JVD present.   Cardiovascular: Normal rate, regular rhythm and intact distal pulses.   Smooth VAD hum   Pulmonary/Chest: Effort normal and breath sounds normal. No respiratory distress. She has no rales.   Abdominal: Soft. Bowel sounds are normal. She exhibits no distension. There is no tenderness. There is no guarding.   Musculoskeletal: She exhibits no edema.   Neurological: She is alert and oriented to person, place, and time.   Skin: Skin is warm.   Psychiatric: She has a normal mood and affect.   Nursing note and vitals reviewed.      Significant Labs:  CBC:  Recent Labs   Lab 12/05/19  0332 12/06/19  0316 12/07/19  0323   WBC 5.71 6.12 5.62   RBC 4.13 4.15 4.08   HGB 9.9* 10.1* 10.3*   HCT 34.8* 35.4* 34.7*    308 313   MCV 84 85 85   MCH 24.0* 24.3* 25.2*   MCHC 28.4* 28.5* 29.7*     BNP:  Recent Labs   Lab 12/02/19  0523 12/04/19  0356 12/06/19  0316   * 298* 231*     CMP:  Recent Labs   Lab 12/02/19  0523 12/04/19  0356 12/05/19  0332 12/06/19  0316 12/07/19  0323   *   < > 128* 132* 129* 133*   CALCIUM 9.2   < > 9.2 9.4 9.2 9.2   ALBUMIN 3.3*  --  3.3*  --  3.3*  --    PROT 6.7  --  6.6  --  6.6  --       < > 140 140 140 140   K 5.1   < > 4.4 3.8 4.0 3.4*   CO2 24   < > 27 26 28 28      < > 101 100 101 100   BUN 30*   < > 33* 28* 28* 26*   CREATININE 1.7*   < > 2.0* 1.9* 1.9* 1.8*   ALKPHOS 92  --  85  --  91  --    ALT 14  --  12  --  14  --    AST 19  --  16  --  19  --    BILITOT 0.3  --  0.4  --  0.3  --     < > = values in this interval not displayed.      Coagulation:   Recent Labs   Lab 12/05/19  0332 12/06/19  0316 12/07/19  0323   INR 2.1* 1.9* 2.4*   APTT 33.1* 33.9* 33.7*     LDH:  No results for input(s): LDH in the last 72 hours.  Microbiology:  Microbiology Results (last 7 days)     ** No results found for the last 168 hours. **          I have reviewed all pertinent labs within  the past 24 hours.    Estimated Creatinine Clearance: 46.2 mL/min (A) (based on SCr of 1.8 mg/dL (H)).    Diagnostic Results:  I have reviewed and interpreted all pertinent imaging results/findings within the past 24 hours.

## 2019-12-07 NOTE — CARE UPDATE
HTS Care Update    Updated Hemodynamics  CVP 12 -> 14, SVo2: 55 -> 60 over the course of the day. Pt received IV lasix 60mg today and has urinated ~ 1.1L so far.   Calculated CO 6.81, CI 3.06,  on 0.02 epi, 20 ppm Simon  Will increase diuresis to IV lasix 80mg TID as she seems to have responded well to the IV lasix 80mg dose yesterday.   Plan of care discussed with Dr. Prieto and bedside nurse.    Luly Chau MD  Cardiology, PGY IV  Pager: [phone]

## 2019-12-07 NOTE — PLAN OF CARE
No acute events during the day. See flowsheets. No VAD alarms. Up to bedside commode 1 assist. Last CHG bath 12/6/2019 day shift. Driveline site 2 dressing CDI. POC for heart w/u. All concerns addressed. WCTM.       [facility] DAILY GOALS       A: Awake    RASS: Goal - RASS Goal: 0-->alert and calm  Actual - RASS (Patel Agitation-Sedation Scale): 0-->alert and calm   Restraint necessity: no   B: Breath   SBT: Not intubated   C: Coordinate A & B, analgesics/sedatives   Pain: managed    SAT: Not intubated  D: Delirium   CAM-ICU: Overall CAM-ICU: Negative  E: Early Mobility   MOVE Screen: Pass   Activity: Activity Management: activity adjusted per tolerance  FAS: Feeding/Nutrition   Diet order: Diet/Nutrition Received: restrict fluids, low saturated fat/low cholesterol, 2 gram sodium,   Fluid restriction: Fluid Requirement: 2L FR  T: Thrombus   DVT prophylaxis: VTE Required Core Measure: Pharmacological prophylaxis initiated/maintained  H: HOB Elevation   Head of Bed (HOB): HOB at 30-45 degrees  U: Ulcer Prophylaxis   GI: yes  G: Glucose control   N/a     S: Skin   Bundle compliance: yes  Bathing/Skin Care: back care, bath, chlorhexidine, bath, complete, linen changed, dressed/undressed Date: 12/6/2019 day shift  B: Bowel Function   constipation   I: Indwelling Catheters   Ofnseca necessity:  no   CVC necessity: yes   IPAD offered: Yes  D: De-escalation Antibx   N/a   Plan for the day     Family/Goals of care/Code Status   Code Status: Prior     No acute events throughout day, VS and assessment per flow sheet, patient progressing towards goals as tolerated, plan of care reviewed with Deborah Navas and family, all concerns addressed, will continue to monitor.

## 2019-12-07 NOTE — ASSESSMENT & PLAN NOTE
-NICM  -Last 2D Echo 11/27/19. Severe TR, IVC 15, mild MR, AV does not open, LVedd 5.4cm @ 5200. Image 72,74, cannot exclude thrombus attached to lead in right atrium.  - IV push lasix today   - EPI .02, NO @ increased to 20   - GDMT: D/C ACE 10 BID,  & aldactone   - was on lasix 20 PRN at home PTA  -2g Na dietary restriction, 1500 mL fluid restriction, strict I/Os

## 2019-12-08 LAB
ALLENS TEST: ABNORMAL
ANION GAP SERPL CALC-SCNC: 12 MMOL/L (ref 8–16)
APTT BLDCRRT: 36.8 SEC (ref 21–32)
BASOPHILS # BLD AUTO: 0.05 K/UL (ref 0–0.2)
BASOPHILS NFR BLD: 0.9 % (ref 0–1.9)
BUN SERPL-MCNC: 25 MG/DL (ref 6–20)
CALCIUM SERPL-MCNC: 9.2 MG/DL (ref 8.7–10.5)
CHLORIDE SERPL-SCNC: 101 MMOL/L (ref 95–110)
CO2 SERPL-SCNC: 27 MMOL/L (ref 23–29)
CREAT SERPL-MCNC: 1.7 MG/DL (ref 0.5–1.4)
DIFFERENTIAL METHOD: ABNORMAL
EOSINOPHIL # BLD AUTO: 0.2 K/UL (ref 0–0.5)
EOSINOPHIL NFR BLD: 3.5 % (ref 0–8)
ERYTHROCYTE [DISTWIDTH] IN BLOOD BY AUTOMATED COUNT: 17.1 % (ref 11.5–14.5)
EST. GFR  (AFRICAN AMERICAN): 40 ML/MIN/1.73 M^2
EST. GFR  (NON AFRICAN AMERICAN): 34.7 ML/MIN/1.73 M^2
GLUCOSE SERPL-MCNC: 139 MG/DL (ref 70–110)
HCO3 UR-SCNC: 32.3 MMOL/L (ref 24–28)
HCT VFR BLD AUTO: 34.2 % (ref 37–48.5)
HGB BLD-MCNC: 10 G/DL (ref 12–16)
IMM GRANULOCYTES # BLD AUTO: 0.02 K/UL (ref 0–0.04)
IMM GRANULOCYTES NFR BLD AUTO: 0.3 % (ref 0–0.5)
INR PPP: 2.5 (ref 0.8–1.2)
LYMPHOCYTES # BLD AUTO: 1.3 K/UL (ref 1–4.8)
LYMPHOCYTES NFR BLD: 22 % (ref 18–48)
MAGNESIUM SERPL-MCNC: 2.3 MG/DL (ref 1.6–2.6)
MCH RBC QN AUTO: 25.1 PG (ref 27–31)
MCHC RBC AUTO-ENTMCNC: 29.2 G/DL (ref 32–36)
MCV RBC AUTO: 86 FL (ref 82–98)
METHEMOGLOBIN: 0.3 % (ref 0–3)
MONOCYTES # BLD AUTO: 0.5 K/UL (ref 0.3–1)
MONOCYTES NFR BLD: 9 % (ref 4–15)
NEUTROPHILS # BLD AUTO: 3.7 K/UL (ref 1.8–7.7)
NEUTROPHILS NFR BLD: 64.3 % (ref 38–73)
NRBC BLD-RTO: 0 /100 WBC
PCO2 BLDA: 51.7 MMHG (ref 35–45)
PH SMN: 7.4 [PH] (ref 7.35–7.45)
PHOSPHATE SERPL-MCNC: 3.9 MG/DL (ref 2.7–4.5)
PLATELET # BLD AUTO: 307 K/UL (ref 150–350)
PMV BLD AUTO: 9.9 FL (ref 9.2–12.9)
PO2 BLDA: 30 MMHG (ref 40–60)
POC BE: 8 MMOL/L
POC SATURATED O2: 55 % (ref 95–100)
POC TCO2: 34 MMOL/L (ref 24–29)
POTASSIUM SERPL-SCNC: 3.6 MMOL/L (ref 3.5–5.1)
PROTHROMBIN TIME: 24.7 SEC (ref 9–12.5)
RBC # BLD AUTO: 3.99 M/UL (ref 4–5.4)
SAMPLE: ABNORMAL
SITE: ABNORMAL
SODIUM SERPL-SCNC: 140 MMOL/L (ref 136–145)
WBC # BLD AUTO: 5.78 K/UL (ref 3.9–12.7)

## 2019-12-08 PROCEDURE — 84100 ASSAY OF PHOSPHORUS: CPT | Mod: NTX

## 2019-12-08 PROCEDURE — 93750 INTERROGATION VAD IN PERSON: CPT | Mod: NTX,,, | Performed by: INTERNAL MEDICINE

## 2019-12-08 PROCEDURE — 94761 N-INVAS EAR/PLS OXIMETRY MLT: CPT | Mod: NTX

## 2019-12-08 PROCEDURE — 85610 PROTHROMBIN TIME: CPT | Mod: NTX

## 2019-12-08 PROCEDURE — 25000003 PHARM REV CODE 250: Mod: NTX | Performed by: PHYSICIAN ASSISTANT

## 2019-12-08 PROCEDURE — 20000000 HC ICU ROOM: Mod: NTX

## 2019-12-08 PROCEDURE — 63600175 PHARM REV CODE 636 W HCPCS: Mod: NTX | Performed by: STUDENT IN AN ORGANIZED HEALTH CARE EDUCATION/TRAINING PROGRAM

## 2019-12-08 PROCEDURE — 27000248 HC VAD-ADDITIONAL DAY: Mod: NTX

## 2019-12-08 PROCEDURE — 27000221 HC OXYGEN, UP TO 24 HOURS: Mod: NTX

## 2019-12-08 PROCEDURE — 85025 COMPLETE CBC W/AUTO DIFF WBC: CPT | Mod: NTX

## 2019-12-08 PROCEDURE — 85730 THROMBOPLASTIN TIME PARTIAL: CPT | Mod: NTX

## 2019-12-08 PROCEDURE — 99291 PR CRITICAL CARE, E/M 30-74 MINUTES: ICD-10-PCS | Mod: NTX,,, | Performed by: PHYSICIAN ASSISTANT

## 2019-12-08 PROCEDURE — 83735 ASSAY OF MAGNESIUM: CPT | Mod: NTX

## 2019-12-08 PROCEDURE — 99900035 HC TECH TIME PER 15 MIN (STAT): Mod: NTX

## 2019-12-08 PROCEDURE — 83050 HGB METHEMOGLOBIN QUAN: CPT | Mod: NTX

## 2019-12-08 PROCEDURE — 63600367 HC NITRIC OXIDE PER HOUR: Mod: NTX

## 2019-12-08 PROCEDURE — 80048 BASIC METABOLIC PNL TOTAL CA: CPT | Mod: NTX

## 2019-12-08 PROCEDURE — 93750 PR INTERROGATE VENT ASSIST DEV, IN PERSON, W PHYSICIAN ANALYSIS: ICD-10-PCS | Mod: NTX,,, | Performed by: INTERNAL MEDICINE

## 2019-12-08 PROCEDURE — 99291 CRITICAL CARE FIRST HOUR: CPT | Mod: NTX,,, | Performed by: PHYSICIAN ASSISTANT

## 2019-12-08 RX ORDER — POTASSIUM CHLORIDE 20 MEQ/1
40 TABLET, EXTENDED RELEASE ORAL 2 TIMES DAILY
Status: DISCONTINUED | OUTPATIENT
Start: 2019-12-08 | End: 2019-12-16

## 2019-12-08 RX ADMIN — MEXILETINE HYDROCHLORIDE 200 MG: 200 CAPSULE ORAL at 09:12

## 2019-12-08 RX ADMIN — POTASSIUM CHLORIDE 40 MEQ: 1500 TABLET, EXTENDED RELEASE ORAL at 09:12

## 2019-12-08 RX ADMIN — MIRTAZAPINE 15 MG: 15 TABLET, FILM COATED ORAL at 09:12

## 2019-12-08 RX ADMIN — MEXILETINE HYDROCHLORIDE 200 MG: 200 CAPSULE ORAL at 02:12

## 2019-12-08 RX ADMIN — PANTOPRAZOLE SODIUM 40 MG: 40 TABLET, DELAYED RELEASE ORAL at 03:12

## 2019-12-08 RX ADMIN — PROMETHAZINE HYDROCHLORIDE 12.5 MG: 12.5 TABLET ORAL at 08:12

## 2019-12-08 RX ADMIN — POTASSIUM CHLORIDE 40 MEQ: 1500 TABLET, EXTENDED RELEASE ORAL at 08:12

## 2019-12-08 RX ADMIN — ASPIRIN 325 MG: 325 TABLET, DELAYED RELEASE ORAL at 08:12

## 2019-12-08 RX ADMIN — GABAPENTIN 200 MG: 100 CAPSULE ORAL at 09:12

## 2019-12-08 RX ADMIN — AMIODARONE HYDROCHLORIDE 400 MG: 200 TABLET ORAL at 08:12

## 2019-12-08 RX ADMIN — MEXILETINE HYDROCHLORIDE 200 MG: 200 CAPSULE ORAL at 05:12

## 2019-12-08 RX ADMIN — SUCRALFATE 1 G: 1 SUSPENSION ORAL at 03:12

## 2019-12-08 RX ADMIN — SUCRALFATE 1 G: 1 SUSPENSION ORAL at 10:12

## 2019-12-08 RX ADMIN — SENNOSIDES AND DOCUSATE SODIUM 1 TABLET: 8.6; 5 TABLET ORAL at 08:12

## 2019-12-08 RX ADMIN — AMIODARONE HYDROCHLORIDE 400 MG: 200 TABLET ORAL at 09:12

## 2019-12-08 RX ADMIN — GABAPENTIN 200 MG: 100 CAPSULE ORAL at 08:12

## 2019-12-08 RX ADMIN — FUROSEMIDE 80 MG: 10 INJECTION, SOLUTION INTRAMUSCULAR; INTRAVENOUS at 09:12

## 2019-12-08 RX ADMIN — FUROSEMIDE 80 MG: 10 INJECTION, SOLUTION INTRAMUSCULAR; INTRAVENOUS at 03:12

## 2019-12-08 RX ADMIN — POLYETHYLENE GLYCOL 3350 17 G: 17 POWDER, FOR SOLUTION ORAL at 08:12

## 2019-12-08 RX ADMIN — ZOLPIDEM TARTRATE 5 MG: 5 TABLET ORAL at 09:12

## 2019-12-08 RX ADMIN — WARFARIN SODIUM 2.5 MG: 2.5 TABLET ORAL at 05:12

## 2019-12-08 RX ADMIN — VENLAFAXINE 150 MG: 37.5 TABLET ORAL at 08:12

## 2019-12-08 RX ADMIN — PANTOPRAZOLE SODIUM 40 MG: 40 TABLET, DELAYED RELEASE ORAL at 05:12

## 2019-12-08 RX ADMIN — SUCRALFATE 1 G: 1 SUSPENSION ORAL at 09:12

## 2019-12-08 RX ADMIN — SENNOSIDES AND DOCUSATE SODIUM 1 TABLET: 8.6; 5 TABLET ORAL at 09:12

## 2019-12-08 RX ADMIN — FUROSEMIDE 80 MG: 10 INJECTION, SOLUTION INTRAMUSCULAR; INTRAVENOUS at 08:12

## 2019-12-08 RX ADMIN — MAGNESIUM OXIDE TAB 400 MG (241.3 MG ELEMENTAL MG) 400 MG: 400 (241.3 MG) TAB at 08:12

## 2019-12-08 RX ADMIN — PROMETHAZINE HYDROCHLORIDE 12.5 MG: 12.5 TABLET ORAL at 12:12

## 2019-12-08 NOTE — EICU
Spoke with nurse Lashanda RN prior to entrance in room. Requesting permission  from patient. VSS, Epinephrine @ 0.02mcg/kg/min. LVAD flow 3.5 speed-5100; Pump  Index 3.2m Pump  Power 3.96. NAD. Resting quietly on left side.

## 2019-12-08 NOTE — PLAN OF CARE
CM[facility] DAILY GOALS   SVO2 55. CVP 15,13,13.     A: Awake    RASS: Goal - RASS Goal: 0-->alert and calm  Actual - RASS (Patel Agitation-Sedation Scale): 0-->alert and calm   Restraint necessity:    B: Breath   SBT: Not intubated   C: Coordinate A & B, analgesics/sedatives   Pain: managed    SAT: Not intubated  D: Delirium   CAM-ICU: Overall CAM-ICU: Negative  E: Early Mobility   MOVE Screen: Pass   Activity: Activity Management: activity adjusted per tolerance, activity clustered for rest period  FAS: Feeding/Nutrition   Diet order: Diet/Nutrition Received: low saturated fat/low cholesterol,   Fluid restriction: Fluid Requirement: 2L FR  T: Thrombus   DVT prophylaxis: VTE Required Core Measure: Pharmacological prophylaxis initiated/maintained  H: HOB Elevation   Head of Bed (HOB): HOB at 20-30 degrees  U: Ulcer Prophylaxis   GI: yes  G: Glucose control  N/a    S: Skin   Bundle compliance: yes   Bathing/Skin Care: back care, bath, chlorhexidine, bath, complete, linen changed, dressed/undressed Date: day bath  B: Bowel Function   no issues   I: Indwelling Catheters   Fonseca necessity:     CVC necessity: Yes   IPAD offered: Yes  D: De-escalation Antibx   NA  Plan for the day   Waiting for transplant.   Family/Goals of care/Code Status   Code Status: Prior     No acute events throughout day, VS and assessment per flow sheet, patient progressing towards goals as tolerated, plan of care reviewed with Deborah Navas and family, all concerns addressed, will continue to monitor.

## 2019-12-08 NOTE — PROGRESS NOTES
12/07/2019  Christo Cooper    Current provider:  Vi Brynat MD      I, Christo Cooper, rounded on Deborah Oliva Navas to ensure all mechanical assist device settings (IABP or VAD) were appropriate and all parameters were within limits.  I was able to ensure all back up equipment was present, the staff had no issues, and the Perfusion Department daily rounding was complete.    9:19 PM

## 2019-12-08 NOTE — PLAN OF CARE
No significant events during the day. AAOx4. HM 3. See VAD flowsheet for numbers. No alarms. CVP 14, 16, & 16. SVO2 60%. Changes made to laxis during the day. Nitric 20ppm 4L NC. CO heartburn. GI cocktail given x1. No BM. Good U/O. Up to bedside commode. Gtts: Epi. POC for heart transplant. All concerns addressed. WCTM.       [facility] DAILY GOALS       A: Awake    RASS: Goal - RASS Goal: 0-->alert and calm  Actual - RASS (Patel Agitation-Sedation Scale): 0-->alert and calm   Restraint necessity:  no  B: Breath   SBT: Not intubated   C: Coordinate A & B, analgesics/sedatives   Pain: managed    SAT: Not intubated  D: Delirium   CAM-ICU: Overall CAM-ICU: Negative  E: Early Mobility   MOVE Screen: Pass   Activity: Activity Management: activity adjusted per tolerance  FAS: Feeding/Nutrition   Diet order: Diet/Nutrition Received: restrict fluids, low saturated fat/low cholesterol, 2 gram sodium,   Fluid restriction: Fluid Requirement: 2L FR  T: Thrombus   DVT prophylaxis: VTE Required Core Measure: Pharmacological prophylaxis initiated/maintained  H: HOB Elevation   Head of Bed (HOB): HOB at 30-45 degrees  U: Ulcer Prophylaxis   GI: yes  G: Glucose control  N/a     S: Skin   Bundle compliance: yes  Bathing/Skin Care: back care, bath, chlorhexidine, bath, complete, linen changed, dressed/undressed Date: 12/6/2019 pt refused day bath   B: Bowel Function   no issues   I: Indwelling Catheters   Fonseca necessity:  no   CVC necessity: yes   IPAD offered: Yes  D: De-escalation Antibx   N/a   Plan for the day   Awaiting transplant   Family/Goals of care/Code Status   Code Status: Prior     No acute events throughout day, VS and assessment per flow sheet, patient progressing towards goals as tolerated, plan of care reviewed with Deborah Navas and family, all concerns addressed, will continue to monitor.

## 2019-12-08 NOTE — SUBJECTIVE & OBJECTIVE
Interval History: patient diuresed well on increased IV push lasix 80 mg TID, CVP down to 14     Continuous Infusions:   epinephrine 0.02 mcg/kg/min (12/08/19 0701)    nitric oxide gas       Scheduled Meds:   amiodarone  400 mg Oral BID    aspirin  325 mg Oral Daily    furosemide  80 mg Intravenous TID    gabapentin  200 mg Oral BID    hepatitis A virus vaccine (PF)  1,440 Units Intramuscular Once    magnesium oxide  400 mg Oral Daily    mexiletine  200 mg Oral Q8H    mirtazapine  15 mg Oral QHS    pantoprazole  40 mg Oral BID AC    polyethylene glycol  17 g Oral Daily    potassium chloride  40 mEq Oral BID    senna-docusate 8.6-50 mg  1 tablet Oral BID    sucralfate  1 g Oral QID (AC & HS)    venlafaxine  150 mg Oral Daily    warfarin  2.5 mg Oral Daily     PRN Meds:ALPRAZolam, pneumoc 13-amber conj-dip cr(PF), promethazine, sodium chloride, white petrolatum, zolpidem    Review of patient's allergies indicates:   Allergen Reactions    Adhesive Blisters     Reaction to area in chest and up only    Codeine Itching     Objective:     Vital Signs (Most Recent):  Temp: 98.2 °F (36.8 °C) (12/08/19 0701)  Pulse: 85 (12/08/19 0701)  Resp: (!) 21 (12/08/19 0701)  BP: (!) 82/0 (12/08/19 0701)  SpO2: 98 % (12/08/19 0351) Vital Signs (24h Range):  Temp:  [97.7 °F (36.5 °C)-98.7 °F (37.1 °C)] 98.2 °F (36.8 °C)  Pulse:  [] 85  Resp:  [17-57] 21  SpO2:  [98 %-100 %] 98 %  BP: (82-90)/(0) 82/0     Patient Vitals for the past 72 hrs (Last 3 readings):   Weight   12/08/19 0300 106.1 kg (233 lb 14.5 oz)   12/07/19 1100 104.2 kg (229 lb 11.5 oz)   12/07/19 0500 104 kg (229 lb 4.5 oz)     Body mass index is 36.91 kg/m².      Intake/Output Summary (Last 24 hours) at 12/8/2019 0809  Last data filed at 12/8/2019 0701  Gross per 24 hour   Intake 834.1 ml   Output 2452 ml   Net -1617.9 ml       Hemodynamic Parameters:  CVP:  [13 mmHg-16 mmHg] 14 mmHg        Physical Exam   Constitutional: She is oriented to person,  place, and time. She appears well-developed and well-nourished.   HENT:   Mouth/Throat: Oropharynx is clear and moist.   Eyes: EOM are normal.   Neck: No JVD present.   Cardiovascular: Normal rate, regular rhythm and intact distal pulses.   Smooth VAD hum   Pulmonary/Chest: Effort normal and breath sounds normal. No respiratory distress. She has no rales.   Abdominal: Soft. Bowel sounds are normal. She exhibits no distension. There is no tenderness. There is no guarding.   Musculoskeletal: She exhibits no edema.   Neurological: She is alert and oriented to person, place, and time.   Skin: Skin is warm.   Psychiatric: She has a normal mood and affect.   Nursing note and vitals reviewed.      Significant Labs:  CBC:  Recent Labs   Lab 12/06/19  0316 12/07/19  0323 12/08/19  0334   WBC 6.12 5.62 5.78   RBC 4.15 4.08 3.99*   HGB 10.1* 10.3* 10.0*   HCT 35.4* 34.7* 34.2*    313 307   MCV 85 85 86   MCH 24.3* 25.2* 25.1*   MCHC 28.5* 29.7* 29.2*     BNP:  Recent Labs   Lab 12/02/19  0523 12/04/19  0356 12/06/19  0316   * 298* 231*     CMP:  Recent Labs   Lab 12/02/19  0523  12/04/19  0356  12/06/19  0316 12/07/19  0323 12/08/19  0334   *   < > 128*   < > 129* 133* 139*   CALCIUM 9.2   < > 9.2   < > 9.2 9.2 9.2   ALBUMIN 3.3*  --  3.3*  --  3.3*  --   --    PROT 6.7  --  6.6  --  6.6  --   --       < > 140   < > 140 140 140   K 5.1   < > 4.4   < > 4.0 3.4* 3.6   CO2 24   < > 27   < > 28 28 27      < > 101   < > 101 100 101   BUN 30*   < > 33*   < > 28* 26* 25*   CREATININE 1.7*   < > 2.0*   < > 1.9* 1.8* 1.7*   ALKPHOS 92  --  85  --  91  --   --    ALT 14  --  12  --  14  --   --    AST 19  --  16  --  19  --   --    BILITOT 0.3  --  0.4  --  0.3  --   --     < > = values in this interval not displayed.      Coagulation:   Recent Labs   Lab 12/06/19  0316 12/07/19  0323 12/08/19  0334   INR 1.9* 2.4* 2.5*   APTT 33.9* 33.7* 36.8*     LDH:  No results for input(s): LDH in the last 72  hours.  Microbiology:  Microbiology Results (last 7 days)     ** No results found for the last 168 hours. **          I have reviewed all pertinent labs within the past 24 hours.    Estimated Creatinine Clearance: 49.4 mL/min (A) (based on SCr of 1.7 mg/dL (H)).    Diagnostic Results:  I have reviewed and interpreted all pertinent imaging results/findings within the past 24 hours.

## 2019-12-08 NOTE — PROGRESS NOTES
[facility]  Heart Transplant  Progress Note    Patient Name: Deborah Navas  MRN: [id number]  Admission Date: 11/13/2019  Hospital Length of Stay: 23 days  Attending Physician: Vi Bryant MD  Primary Care Provider: Primary Doctor No  Principal Problem:Left ventricular assist device (LVAD) complication    Subjective:     Interval History: patient diuresed well on increased IV push lasix 80 mg TID, CVP down to 14     Continuous Infusions:   epinephrine 0.02 mcg/kg/min (12/08/19 0701)    nitric oxide gas       Scheduled Meds:   amiodarone  400 mg Oral BID    aspirin  325 mg Oral Daily    furosemide  80 mg Intravenous TID    gabapentin  200 mg Oral BID    hepatitis A virus vaccine (PF)  1,440 Units Intramuscular Once    magnesium oxide  400 mg Oral Daily    mexiletine  200 mg Oral Q8H    mirtazapine  15 mg Oral QHS    pantoprazole  40 mg Oral BID AC    polyethylene glycol  17 g Oral Daily    potassium chloride  40 mEq Oral BID    senna-docusate 8.6-50 mg  1 tablet Oral BID    sucralfate  1 g Oral QID (AC & HS)    venlafaxine  150 mg Oral Daily    warfarin  2.5 mg Oral Daily     PRN Meds:ALPRAZolam, pneumoc 13-amber conj-dip cr(PF), promethazine, sodium chloride, white petrolatum, zolpidem    Review of patient's allergies indicates:   Allergen Reactions    Adhesive Blisters     Reaction to area in chest and up only    Codeine Itching     Objective:     Vital Signs (Most Recent):  Temp: 98.2 °F (36.8 °C) (12/08/19 0701)  Pulse: 85 (12/08/19 0701)  Resp: (!) 21 (12/08/19 0701)  BP: (!) 82/0 (12/08/19 0701)  SpO2: 98 % (12/08/19 0351) Vital Signs (24h Range):  Temp:  [97.7 °F (36.5 °C)-98.7 °F (37.1 °C)] 98.2 °F (36.8 °C)  Pulse:  [] 85  Resp:  [17-57] 21  SpO2:  [98 %-100 %] 98 %  BP: (82-90)/(0) 82/0     Patient Vitals for the past 72 hrs (Last 3 readings):   Weight   12/08/19 0300 106.1 kg (233 lb 14.5 oz)   12/07/19 1100 104.2 kg (229 lb 11.5 oz)   12/07/19 0500  104 kg (229 lb 4.5 oz)     Body mass index is 36.91 kg/m².      Intake/Output Summary (Last 24 hours) at 12/8/2019 0809  Last data filed at 12/8/2019 0701  Gross per 24 hour   Intake 834.1 ml   Output 2452 ml   Net -1617.9 ml       Hemodynamic Parameters:  CVP:  [13 mmHg-16 mmHg] 14 mmHg        Physical Exam   Constitutional: She is oriented to person, place, and time. She appears well-developed and well-nourished.   HENT:   Mouth/Throat: Oropharynx is clear and moist.   Eyes: EOM are normal.   Neck: No JVD present.   Cardiovascular: Normal rate, regular rhythm and intact distal pulses.   Smooth VAD hum   Pulmonary/Chest: Effort normal and breath sounds normal. No respiratory distress. She has no rales.   Abdominal: Soft. Bowel sounds are normal. She exhibits no distension. There is no tenderness. There is no guarding.   Musculoskeletal: She exhibits no edema.   Neurological: She is alert and oriented to person, place, and time.   Skin: Skin is warm.   Psychiatric: She has a normal mood and affect.   Nursing note and vitals reviewed.      Significant Labs:  CBC:  Recent Labs   Lab 12/06/19 0316 12/07/19  0323 12/08/19  0334   WBC 6.12 5.62 5.78   RBC 4.15 4.08 3.99*   HGB 10.1* 10.3* 10.0*   HCT 35.4* 34.7* 34.2*    313 307   MCV 85 85 86   MCH 24.3* 25.2* 25.1*   MCHC 28.5* 29.7* 29.2*     BNP:  Recent Labs   Lab 12/02/19 0523 12/04/19  0356 12/06/19  0316   * 298* 231*     CMP:  Recent Labs   Lab 12/02/19  0523 12/04/19  0356  12/06/19 0316 12/07/19  0323 12/08/19  0334   *   < > 128*   < > 129* 133* 139*   CALCIUM 9.2   < > 9.2   < > 9.2 9.2 9.2   ALBUMIN 3.3*  --  3.3*  --  3.3*  --   --    PROT 6.7  --  6.6  --  6.6  --   --       < > 140   < > 140 140 140   K 5.1   < > 4.4   < > 4.0 3.4* 3.6   CO2 24   < > 27   < > 28 28 27      < > 101   < > 101 100 101   BUN 30*   < > 33*   < > 28* 26* 25*   CREATININE 1.7*   < > 2.0*   < > 1.9* 1.8* 1.7*   ALKPHOS 92  --  85  --  91  --    --    ALT 14  --  12  --  14  --   --    AST 19  --  16  --  19  --   --    BILITOT 0.3  --  0.4  --  0.3  --   --     < > = values in this interval not displayed.      Coagulation:   Recent Labs   Lab 12/06/19  0316 12/07/19  0323 12/08/19  0334   INR 1.9* 2.4* 2.5*   APTT 33.9* 33.7* 36.8*     LDH:  No results for input(s): LDH in the last 72 hours.  Microbiology:  Microbiology Results (last 7 days)     ** No results found for the last 168 hours. **          I have reviewed all pertinent labs within the past 24 hours.    Estimated Creatinine Clearance: 49.4 mL/min (A) (based on SCr of 1.7 mg/dL (H)).    Diagnostic Results:  I have reviewed and interpreted all pertinent imaging results/findings within the past 24 hours.    Assessment and Plan:     48 yo WF with NICM, s/p HM3 implantation 9/10/19 (post up coarse uncomplicated with the exception of+ diaphragmatic stimulation of LV lead and pleural effusion with chest tube placement, atrial flutter with NADINE/DCCV), V-fib reported in setting of hypokalemia with appropriate shock from ICD on Amiodarone prior to LVAD placement; and recent hospitalizations for ventricular arrythmias; started on Mexilitine.  She was seen in EP clinic today and she continues to have multiple VT episodes with some ATP therapy, although no ICD shocks since starting mexiletine 10/24/2019. One slow VT episode 2.5hrs 11/3/2019. Patient asymptomatic with LVAD. On coumadin. No AFL since post-op event (paced out of rhythm 9/24/2019). CHB with 100% V pacing (LV lead off). She does not feel well. Dry heaving with mexiletine. Taking phenergan PRN. 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   She was seen in HTS clinic and reported 4 low flow alarms the last 4 nights.  She reports they occur in her sleep and last for only a few seconds.  By the time she wakes up; they quickly stop.  She does report to possibly being little volume overloaded.  She hasn't noticed weight  gain at home but thinks she may have little extra fluid.  She denies SOB, chest pain, palpitations, LEGER. In review of her records: she was 223lbs on 10/24/19 during previous hospitalization and is 235lbs now.  Her lasix had previously been decreased to prn.     * Left ventricular assist device (LVAD) complication  - hx low flow alarms prior to admit, last LFA 12/1  - Possibly secondary to ADHF--> RV failure  - Formal report of CT unremarkable; however, it was a non contrast as creat was elevated above baseline on admit and inflow and outflow cannulas not accurately visualized    GLO (acute kidney injury)  - see CKD    Organ transplant candidate  - will list with exemption due to VT with ICD shocks and RV failure (will not tolerate ) and does not tolerate NO wean  - approved for transplant on 11/26. Tier 2.     Essential hypertension  -Patient is non-pulsatile  -Doppler goal 60-90 mmHg, currently controlled  -Antihypertensive medications include: d/c ace and aldactone today      Ventricular tachycardia  - Per EP office visit on day of admission: She continues to have multiple VT episodes with some ATP therapy, although no ICD shocks since starting mexiletine 10/24/2019. One slow VT episode 2.5hrs 11/3/2019. Patient asymptomatic with LVAD. On coumadin. No AFL since post-op event (paced out of rhythm 9/24/2019). CHB with 100% V pacing (LV lead off).   - Plan was to continue current regimen despite Mexiletine making her nauseous.  - Decreased Amiodarone to daily per EP plan on discharge last hospitalization   - Shocked x 3 11/25 for MMVT (in addition has had numerous successful ATP's for MMVT)   - S/P amio load   - now on Amio 400 BID and mexiletine increased to 200    LVAD (left ventricular assist device) present  - HeartMate 3 Implanted 9/10/19 as DT.  - Implanted by Dr. Walden  - INR  theraputic Dose coumadin per Pharmacist.  - Antiplatelets:  mg.  - LDH is stable Will monitor daily.   - Speed set at  5100  - Epi @ .02, NO @ 20.   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 Urgent selection 11/26, approved for transplant. Listed today tier 2.    Procedure: Device Interrogation Including analysis of device parameters  Current Settings: Ventricular Assist Device  Review of device function is stable    TXP LVAD INTERROGATIONS 12/6/2019 12/6/2019 12/6/2019 12/6/2019 12/6/2019 12/6/2019 12/6/2019   Type HeartMate3 HeartMate3 HeartMate3 HeartMate3 HeartMate3 HeartMate3 HeartMate3   Flow 4.1 4.0 4.0 4.2 4.1 4.3 4.0   Speed 5100 5100 5100 5100 5100 5100 5100   PI 3.7 3.8 3.7 2.7 3.3 2.8 3.5   Power (Orellana) 3.2 3.3 3.5 3.5 3.6 3.5 3.5   LSL 4700 4700 - - - - -   Pulsatility Intermittent pulse Intermittent pulse Intermittent pulse Intermittent pulse Intermittent pulse Intermittent pulse Intermittent pulse       CKD (chronic kidney disease)  - Baseline creat appears 1.3-1.6. 1.9 today.  - Will monitor with diuresis     Acute on chronic combined systolic and diastolic heart failure  -NICM  -Last 2D Echo 11/27/19. Severe TR, IVC 15, mild MR, AV does not open, LVedd 5.4cm @ 5200. Image 72,74, cannot exclude thrombus attached to lead in right atrium.  - IV push lasix today   - EPI .02, NO @ increased to 20   - GDMT: D/C ACE 10 BID,  & aldactone   - was on lasix 20 PRN at home PTA  -2g Na dietary restriction, 1500 mL fluid restriction, strict I/Os      ICD (implantable cardioverter-defibrillator) in place  - [employer]onic ICD  - Shocked x 3 11/25 for MMVT   - Loaded with IV amio on 11/25. Now on amio 400 PO BID and mexiletine increased to 200 QD   - See above VT      Uninterrupted Critical Care/Counseling Time (not including procedures): 30 minutes    ZAC Houston  Heart Transplant  [facility]

## 2019-12-08 NOTE — EICU
[id number] Privacy indicator at patient's request. XOCHITL Mallory RN notified our CN regarding calling her nurse prior to checking patient.Informed Drew Chen RN.

## 2019-12-08 NOTE — CARE UPDATE
HTS Care Update     Hemodynamics this afternoon  CVP 14, 15, SVo2: 60, CO 6.81, CI 3.06,  on 0.02 epi, 20 ppm Simon    Since IV lasix 80mg was given pt urinated ~ 800 cc. Total 1.8L today (net - 1.1L) Will continue IV lasisx 80mg TID and closely monitor UOP, CVP, SVO2    Plan of care discussed with Dr. Prieto.      Luly Chau MD  Cardiology, PGY IV  Pager: [phone]

## 2019-12-08 NOTE — PROGRESS NOTES
12/08/2019  Christo Cooper    Current provider:  Vi Bryant MD      I, Christo Cooper, rounded on Deborah Oliva Navas to ensure all mechanical assist device settings (IABP or VAD) were appropriate and all parameters were within limits.  I was able to ensure all back up equipment was present, the staff had no issues, and the Perfusion Department daily rounding was complete.    7:41 AM

## 2019-12-09 PROBLEM — R11.0 NAUSEA: Status: ACTIVE | Noted: 2019-12-09

## 2019-12-09 LAB
ALBUMIN SERPL BCP-MCNC: 3.4 G/DL (ref 3.5–5.2)
ALLENS TEST: ABNORMAL
ALP SERPL-CCNC: 89 U/L (ref 55–135)
ALT SERPL W/O P-5'-P-CCNC: 16 U/L (ref 10–44)
ANION GAP SERPL CALC-SCNC: 12 MMOL/L (ref 8–16)
APTT BLDCRRT: 38.3 SEC (ref 21–32)
AST SERPL-CCNC: 21 U/L (ref 10–40)
BASOPHILS # BLD AUTO: 0.06 K/UL (ref 0–0.2)
BASOPHILS NFR BLD: 0.9 % (ref 0–1.9)
BILIRUB DIRECT SERPL-MCNC: 0.2 MG/DL (ref 0.1–0.3)
BILIRUB SERPL-MCNC: 0.3 MG/DL (ref 0.1–1)
BNP SERPL-MCNC: 346 PG/ML (ref 0–99)
BUN SERPL-MCNC: 22 MG/DL (ref 6–20)
CALCIUM SERPL-MCNC: 9.1 MG/DL (ref 8.7–10.5)
CHLORIDE SERPL-SCNC: 99 MMOL/L (ref 95–110)
CO2 SERPL-SCNC: 28 MMOL/L (ref 23–29)
CREAT SERPL-MCNC: 1.6 MG/DL (ref 0.5–1.4)
CRP SERPL-MCNC: 12.3 MG/L (ref 0–8.2)
DIFFERENTIAL METHOD: ABNORMAL
EOSINOPHIL # BLD AUTO: 0.2 K/UL (ref 0–0.5)
EOSINOPHIL NFR BLD: 3.4 % (ref 0–8)
ERYTHROCYTE [DISTWIDTH] IN BLOOD BY AUTOMATED COUNT: 17.1 % (ref 11.5–14.5)
EST. GFR  (AFRICAN AMERICAN): 43 ML/MIN/1.73 M^2
EST. GFR  (NON AFRICAN AMERICAN): 37.3 ML/MIN/1.73 M^2
GLUCOSE SERPL-MCNC: 141 MG/DL (ref 70–110)
HCO3 UR-SCNC: 31.5 MMOL/L (ref 24–28)
HCT VFR BLD AUTO: 34.8 % (ref 37–48.5)
HGB BLD-MCNC: 10 G/DL (ref 12–16)
IMM GRANULOCYTES # BLD AUTO: 0.01 K/UL (ref 0–0.04)
IMM GRANULOCYTES NFR BLD AUTO: 0.2 % (ref 0–0.5)
INR PPP: 3.1 (ref 0.8–1.2)
LDH SERPL L TO P-CCNC: 229 U/L (ref 110–260)
LYMPHOCYTES # BLD AUTO: 1.1 K/UL (ref 1–4.8)
LYMPHOCYTES NFR BLD: 16.1 % (ref 18–48)
MAGNESIUM SERPL-MCNC: 2.3 MG/DL (ref 1.6–2.6)
MCH RBC QN AUTO: 24.8 PG (ref 27–31)
MCHC RBC AUTO-ENTMCNC: 28.7 G/DL (ref 32–36)
MCV RBC AUTO: 86 FL (ref 82–98)
METHEMOGLOBIN: 0.4 % (ref 0–3)
MONOCYTES # BLD AUTO: 0.5 K/UL (ref 0.3–1)
MONOCYTES NFR BLD: 7.7 % (ref 4–15)
NEUTROPHILS # BLD AUTO: 4.7 K/UL (ref 1.8–7.7)
NEUTROPHILS NFR BLD: 71.7 % (ref 38–73)
NRBC BLD-RTO: 0 /100 WBC
PCO2 BLDA: 53.2 MMHG (ref 35–45)
PH SMN: 7.38 [PH] (ref 7.35–7.45)
PHOSPHATE SERPL-MCNC: 4 MG/DL (ref 2.7–4.5)
PLATELET # BLD AUTO: 298 K/UL (ref 150–350)
PMV BLD AUTO: 10 FL (ref 9.2–12.9)
PO2 BLDA: 33 MMHG (ref 40–60)
POC BE: 6 MMOL/L
POC SATURATED O2: 61 % (ref 95–100)
POC TCO2: 33 MMOL/L (ref 24–29)
POTASSIUM SERPL-SCNC: 4 MMOL/L (ref 3.5–5.1)
PREALB SERPL-MCNC: 28 MG/DL (ref 20–43)
PROT SERPL-MCNC: 6.8 G/DL (ref 6–8.4)
PROTHROMBIN TIME: 30.5 SEC (ref 9–12.5)
RBC # BLD AUTO: 4.04 M/UL (ref 4–5.4)
SAMPLE: ABNORMAL
SITE: ABNORMAL
SODIUM SERPL-SCNC: 139 MMOL/L (ref 136–145)
WBC # BLD AUTO: 6.53 K/UL (ref 3.9–12.7)

## 2019-12-09 PROCEDURE — 99291 PR CRITICAL CARE, E/M 30-74 MINUTES: ICD-10-PCS | Mod: NTX,,, | Performed by: PHYSICIAN ASSISTANT

## 2019-12-09 PROCEDURE — 27000248 HC VAD-ADDITIONAL DAY: Mod: NTX

## 2019-12-09 PROCEDURE — 83735 ASSAY OF MAGNESIUM: CPT | Mod: NTX

## 2019-12-09 PROCEDURE — 99900035 HC TECH TIME PER 15 MIN (STAT): Mod: NTX

## 2019-12-09 PROCEDURE — 99291 CRITICAL CARE FIRST HOUR: CPT | Mod: NTX,,, | Performed by: PHYSICIAN ASSISTANT

## 2019-12-09 PROCEDURE — 83880 ASSAY OF NATRIURETIC PEPTIDE: CPT | Mod: NTX

## 2019-12-09 PROCEDURE — 25000003 PHARM REV CODE 250: Mod: NTX | Performed by: PHYSICIAN ASSISTANT

## 2019-12-09 PROCEDURE — 84134 ASSAY OF PREALBUMIN: CPT | Mod: NTX

## 2019-12-09 PROCEDURE — 85730 THROMBOPLASTIN TIME PARTIAL: CPT | Mod: NTX

## 2019-12-09 PROCEDURE — 90471 IMMUNIZATION ADMIN: CPT | Mod: NTX | Performed by: PHYSICIAN ASSISTANT

## 2019-12-09 PROCEDURE — 93463 DRUG ADMIN & HEMODYNMIC MEAS: CPT | Mod: NTX

## 2019-12-09 PROCEDURE — 94761 N-INVAS EAR/PLS OXIMETRY MLT: CPT | Mod: NTX

## 2019-12-09 PROCEDURE — 84100 ASSAY OF PHOSPHORUS: CPT | Mod: NTX

## 2019-12-09 PROCEDURE — 83615 LACTATE (LD) (LDH) ENZYME: CPT | Mod: NTX

## 2019-12-09 PROCEDURE — 63600175 PHARM REV CODE 636 W HCPCS: Mod: NTX | Performed by: STUDENT IN AN ORGANIZED HEALTH CARE EDUCATION/TRAINING PROGRAM

## 2019-12-09 PROCEDURE — 80048 BASIC METABOLIC PNL TOTAL CA: CPT | Mod: NTX

## 2019-12-09 PROCEDURE — 83050 HGB METHEMOGLOBIN QUAN: CPT | Mod: NTX

## 2019-12-09 PROCEDURE — 85025 COMPLETE CBC W/AUTO DIFF WBC: CPT | Mod: NTX

## 2019-12-09 PROCEDURE — 85610 PROTHROMBIN TIME: CPT | Mod: NTX

## 2019-12-09 PROCEDURE — 63600367 HC NITRIC OXIDE PER HOUR: Mod: NTX

## 2019-12-09 PROCEDURE — 27000221 HC OXYGEN, UP TO 24 HOURS: Mod: NTX

## 2019-12-09 PROCEDURE — 86140 C-REACTIVE PROTEIN: CPT | Mod: NTX

## 2019-12-09 PROCEDURE — 93750 PR INTERROGATE VENT ASSIST DEV, IN PERSON, W PHYSICIAN ANALYSIS: ICD-10-PCS | Mod: NTX,,, | Performed by: INTERNAL MEDICINE

## 2019-12-09 PROCEDURE — 20000000 HC ICU ROOM: Mod: NTX

## 2019-12-09 PROCEDURE — 63600175 PHARM REV CODE 636 W HCPCS: Mod: NTX | Performed by: PHYSICIAN ASSISTANT

## 2019-12-09 PROCEDURE — 80076 HEPATIC FUNCTION PANEL: CPT | Mod: NTX

## 2019-12-09 PROCEDURE — 93750 INTERROGATION VAD IN PERSON: CPT | Mod: NTX,,, | Performed by: INTERNAL MEDICINE

## 2019-12-09 PROCEDURE — 25000003 PHARM REV CODE 250: Mod: NTX | Performed by: INTERNAL MEDICINE

## 2019-12-09 PROCEDURE — 90633 HEPA VACC PED/ADOL 2 DOSE IM: CPT | Mod: NTX | Performed by: PHYSICIAN ASSISTANT

## 2019-12-09 RX ORDER — ONDANSETRON 2 MG/ML
8 INJECTION INTRAMUSCULAR; INTRAVENOUS EVERY 6 HOURS PRN
Status: DISCONTINUED | OUTPATIENT
Start: 2019-12-09 | End: 2019-12-16

## 2019-12-09 RX ORDER — FAMOTIDINE 20 MG/1
20 TABLET, FILM COATED ORAL NIGHTLY
Status: DISCONTINUED | OUTPATIENT
Start: 2019-12-09 | End: 2019-12-16

## 2019-12-09 RX ADMIN — FAMOTIDINE 20 MG: 20 TABLET ORAL at 09:12

## 2019-12-09 RX ADMIN — EPINEPHRINE 0.02 MCG/KG/MIN: 1 INJECTION PARENTERAL at 04:12

## 2019-12-09 RX ADMIN — ONDANSETRON 8 MG: 2 INJECTION INTRAMUSCULAR; INTRAVENOUS at 11:12

## 2019-12-09 RX ADMIN — ZOLPIDEM TARTRATE 5 MG: 5 TABLET ORAL at 10:12

## 2019-12-09 RX ADMIN — FUROSEMIDE 80 MG: 10 INJECTION, SOLUTION INTRAMUSCULAR; INTRAVENOUS at 02:12

## 2019-12-09 RX ADMIN — MEXILETINE HYDROCHLORIDE 200 MG: 200 CAPSULE ORAL at 02:12

## 2019-12-09 RX ADMIN — POTASSIUM CHLORIDE 40 MEQ: 1500 TABLET, EXTENDED RELEASE ORAL at 08:12

## 2019-12-09 RX ADMIN — SUCRALFATE 1 G: 1 SUSPENSION ORAL at 10:12

## 2019-12-09 RX ADMIN — SENNOSIDES AND DOCUSATE SODIUM 1 TABLET: 8.6; 5 TABLET ORAL at 09:12

## 2019-12-09 RX ADMIN — FUROSEMIDE 80 MG: 10 INJECTION, SOLUTION INTRAMUSCULAR; INTRAVENOUS at 08:12

## 2019-12-09 RX ADMIN — SUCRALFATE 1 G: 1 SUSPENSION ORAL at 06:12

## 2019-12-09 RX ADMIN — GABAPENTIN 200 MG: 100 CAPSULE ORAL at 08:12

## 2019-12-09 RX ADMIN — MIRTAZAPINE 15 MG: 15 TABLET, FILM COATED ORAL at 09:12

## 2019-12-09 RX ADMIN — PANTOPRAZOLE SODIUM 40 MG: 40 TABLET, DELAYED RELEASE ORAL at 04:12

## 2019-12-09 RX ADMIN — AMIODARONE HYDROCHLORIDE 400 MG: 200 TABLET ORAL at 08:12

## 2019-12-09 RX ADMIN — PANTOPRAZOLE SODIUM 40 MG: 40 TABLET, DELAYED RELEASE ORAL at 06:12

## 2019-12-09 RX ADMIN — POTASSIUM CHLORIDE 40 MEQ: 1500 TABLET, EXTENDED RELEASE ORAL at 09:12

## 2019-12-09 RX ADMIN — VENLAFAXINE 150 MG: 37.5 TABLET ORAL at 08:12

## 2019-12-09 RX ADMIN — AMIODARONE HYDROCHLORIDE 400 MG: 200 TABLET ORAL at 09:12

## 2019-12-09 RX ADMIN — MEXILETINE HYDROCHLORIDE 200 MG: 200 CAPSULE ORAL at 09:12

## 2019-12-09 RX ADMIN — GABAPENTIN 200 MG: 100 CAPSULE ORAL at 09:12

## 2019-12-09 RX ADMIN — SUCRALFATE 1 G: 1 SUSPENSION ORAL at 02:12

## 2019-12-09 RX ADMIN — POLYETHYLENE GLYCOL 3350 17 G: 17 POWDER, FOR SOLUTION ORAL at 08:12

## 2019-12-09 RX ADMIN — HEPATITIS A VACCINE 1440 UNITS: 720 INJECTION, SUSPENSION INTRAMUSCULAR at 10:12

## 2019-12-09 RX ADMIN — MEXILETINE HYDROCHLORIDE 200 MG: 200 CAPSULE ORAL at 06:12

## 2019-12-09 RX ADMIN — SENNOSIDES AND DOCUSATE SODIUM 1 TABLET: 8.6; 5 TABLET ORAL at 08:12

## 2019-12-09 RX ADMIN — ASPIRIN 325 MG: 325 TABLET, DELAYED RELEASE ORAL at 08:12

## 2019-12-09 RX ADMIN — MAGNESIUM OXIDE TAB 400 MG (241.3 MG ELEMENTAL MG) 400 MG: 400 (241.3 MG) TAB at 08:12

## 2019-12-09 NOTE — PLAN OF CARE
No acute events throughout day, VS and assessment per flow sheet, see below for updates:    Pulmonary: remains on nitric 20ppm via 4L NC with SpO2 maintaining >95% throughout shift; pt complaining of LEGER    Cardiovascular: HM3 in place with flows 3.4-4.2 at speed 5100 with no alarms this shift; CVP 10-11 throughout shift; dopplers 80-88; SR 80s    Neurological: AAOx4; afebrile; moves all extremities spontaneously without difficulty    Gastrointestinal: one episode of nausea/emesis after using BSC - relief obtained after PRN Zofran; tolerating cardiac diet well and educated on fluid restriction; one BM this shift    Genitourinary: voids spontaneously per BSC    Skin/Bath: generalized trace edema noted; VAD drsg change completed - driveline exit site without drainage, but reddened    Date of last CHG bath given: 12/9/19    Infusions: epi gtt continues    Patient progressing towards goals as tolerated, plan of care communicated and reviewed with Deborah Navas and family, all concerns addressed, will continue to monitor.     Anusha Freeman RN    CMICU DAILY GOALS       A: Awake    RASS: Goal - RASS Goal: 0-->alert and calm  Actual - RASS (Patel Agitation-Sedation Scale): 0-->alert and calm   Restraint necessity:    B: Breath   SBT: Not intubated   C: Coordinate A & B, analgesics/sedatives   Pain: managed    SAT: Not intubated  D: Delirium   CAM-ICU: Overall CAM-ICU: Negative  E: Early Mobility   MOVE Screen: Pass   Activity: Activity Management: activity adjusted per tolerance  FAS: Feeding/Nutrition   Diet order: Diet/Nutrition Received: 2 gram sodium, low saturated fat/low cholesterol,   Fluid restriction: Fluid Requirement: 2L FR  T: Thrombus   DVT prophylaxis: VTE Required Core Measure: Pharmacological prophylaxis initiated/maintained  H: HOB Elevation   Head of Bed (HOB): HOB at 30-45 degrees  U: Ulcer Prophylaxis   GI: yes  G: Glucose control   managed    S: Skin   Bundle compliance: yes    Bathing/Skin Care: back care, bath, chlorhexidine, bath, complete, linen changed, shampoo, dressed/undressed Date: 12/9/19  B: Bowel Function   no issues   I: Indwelling Catheters   Fonseca necessity:     CVC necessity: Yes   IPAD offered: Yes  D: De-escalation Antibx   Yes  Plan for the day   Continue supportive care while waiting for OHTx  Family/Goals of care/Code Status   Code Status: Full Code     No acute events throughout day, VS and assessment per flow sheet, patient progressing towards goals as tolerated, plan of care reviewed with Deborah Navas and family, all concerns addressed, will continue to monitor.

## 2019-12-09 NOTE — ASSESSMENT & PLAN NOTE
- HeartMate 3 Implanted 9/10/19 as DT.  - Implanted by Dr. Walden  - INR  Theraputic.   - per Dr Lua will d/c coumadin, let drift down and bridge with heparin in preparation for OHTx  - Antiplatelets:  mg.  - LDH is stable Will monitor daily.   - Speed set at 5100  - Epi @ .02, NO @ 20.   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 Urgent selection 11/26, approved for transplant. Listed tier 2.    Procedure: Device Interrogation Including analysis of device parameters  Current Settings: Ventricular Assist Device  Review of device function is stable    TXP LVAD INTERROGATIONS 12/9/2019 12/9/2019 12/9/2019 12/9/2019 12/9/2019 12/9/2019 12/9/2019   Type HeartMate3 HeartMate3 HeartMate3 HeartMate3 HeartMate3 HeartMate3 HeartMate3   Flow 4.1 3.4 3.9 3.8 3.9 4.0 4.4   Speed 5100 5100 5100 5150 5150 5100 5150   PI 3.6 6.2 4.2 4.7 4.5 3.9 2.4   Power (Orellana) 3.5 3.4 3.6 3.5 3.4 3.6 3.6   LSL - - - 4700 4700 4700 4700   Pulsatility - - Intermittent pulse - No Pulse No Pulse No Pulse

## 2019-12-09 NOTE — PROGRESS NOTES
12/09/2019  Abhijit Perez    Current provider:  Vi Bryant MD      I, Abhijit Perez, rounded on Deborah Navas to ensure all mechanical assist device settings (IABP or VAD) were appropriate and all parameters were within limits.  I was able to ensure all back up equipment was present, the staff had no issues, and the Perfusion Department daily rounding was complete.    12:01 PM

## 2019-12-09 NOTE — ASSESSMENT & PLAN NOTE
- NICM  - Last 2D Echo 12/4/19. AV does not open, LVedd 5.5cm @ 5100. - IV push lasix today 80 TID   - EPI .02, NO @ 20   - CVP 10 and SvO2 61 this AM  - GDMT: D/C ACE 10 BID,  & aldactone   - was on lasix 20 PRN at home PTA  -2g Na dietary restriction, 1500 mL fluid restriction, strict I/Os

## 2019-12-09 NOTE — PROGRESS NOTES
Update:    SW met with pt alone in pt's room in order to provide continuity of cared and support. Pt is currently listed at Status 2 for a heart transplant.     Pt was AAOx4, pleasant and sitting up in bed. Pt reports some nausea this morning that she reports could be related to anxiety or medication. SW provided support around pt's anxiety and reviewed coping skills. Pt requesting pet therapy with a dog, but specifically requested not to have the adiliaahua come due to dog's anxiety. SW to contact [employer] to see if this can be arranged.     Pt states that her mother is back in [place] and that her friend Flavia will come to the hospital if pt gets a call for a heart. Mother is backup caregiver and will come to the hospital if necessary. Pt coping adequately despite her anxiety. Pt denying further needs at this time. SW remains available for continued psychosocial support, education, resources, and additional d/c planning as needed.     BENIGNO contacted Kala Hdez from [employer] [phone] and notified her of pt's request for pet therapy. BENIGNO explained that pt open to any dog except for the Chikarishmaahua as this particular dog was anxious. Kala to set something up on pt's behalf for some time this week. BENIGNO notified pt and remains available.

## 2019-12-09 NOTE — SUBJECTIVE & OBJECTIVE
Interval History: Doing well this AM, complaining on intermittent nausea. Vomited X1. With some relief. BM two days ago. Denies chest pain, SOB. Was able to johanny up to bedside commode without dizziness or issues with flows.     Continuous Infusions:   epinephrine 0.02 mcg/kg/min (12/09/19 1000)    nitric oxide gas       Scheduled Meds:   amiodarone  400 mg Oral BID    aspirin  325 mg Oral Daily    famotidine  20 mg Oral QHS    furosemide  80 mg Intravenous TID    gabapentin  200 mg Oral BID    magnesium oxide  400 mg Oral Daily    mexiletine  200 mg Oral Q8H    mirtazapine  15 mg Oral QHS    pantoprazole  40 mg Oral BID AC    polyethylene glycol  17 g Oral Daily    potassium chloride  40 mEq Oral BID    senna-docusate 8.6-50 mg  1 tablet Oral BID    sucralfate  1 g Oral QID (AC & HS)    venlafaxine  150 mg Oral Daily     PRN Meds:ALPRAZolam, ondansetron, pneumoc 13-amber conj-dip cr(PF), promethazine, sodium chloride, white petrolatum, zolpidem    Review of patient's allergies indicates:   Allergen Reactions    Adhesive Blisters     Reaction to area in chest and up only    Codeine Itching     Objective:     Vital Signs (Most Recent):  Temp: 97.5 °F (36.4 °C) (12/09/19 0800)  Pulse: 92 (12/09/19 1000)  Resp: 20 (12/09/19 1000)  BP: (!) 88/0 (12/09/19 0835)  SpO2: 98 % (12/09/19 0742) Vital Signs (24h Range):  Temp:  [97.5 °F (36.4 °C)-98.5 °F (36.9 °C)] 97.5 °F (36.4 °C)  Pulse:  [79-93] 92  Resp:  [16-59] 20  SpO2:  [98 %-100 %] 98 %  BP: (74-88)/(0) 88/0     Patient Vitals for the past 72 hrs (Last 3 readings):   Weight   12/09/19 0300 105.3 kg (232 lb 2.3 oz)   12/08/19 0300 106.1 kg (233 lb 14.5 oz)   12/07/19 1100 104.2 kg (229 lb 11.5 oz)     Body mass index is 36.63 kg/m².      Intake/Output Summary (Last 24 hours) at 12/9/2019 1044  Last data filed at 12/9/2019 1000  Gross per 24 hour   Intake 1019 ml   Output 2700 ml   Net -1681 ml       Hemodynamic Parameters:  CVP:  [11 mmHg-13 mmHg] 13  mmHg    Physical Exam   Constitutional: She is oriented to person, place, and time. She appears well-developed and well-nourished.   HENT:   Mouth/Throat: Oropharynx is clear and moist.   Eyes: EOM are normal.   Neck: No JVD present.   Cardiovascular: Normal rate, regular rhythm and intact distal pulses.   Smooth VAD hum   Pulmonary/Chest: Effort normal and breath sounds normal. No respiratory distress. She has no rales.   Abdominal: Soft. Bowel sounds are normal. She exhibits no distension. There is no tenderness. There is no guarding.   Musculoskeletal: She exhibits no edema.   Neurological: She is alert and oriented to person, place, and time.   Skin: Skin is warm.   Psychiatric: She has a normal mood and affect.   Nursing note and vitals reviewed.      Significant Labs:  CBC:  Recent Labs   Lab 12/07/19  0323 12/08/19  0334 12/09/19  0333   WBC 5.62 5.78 6.53   RBC 4.08 3.99* 4.04   HGB 10.3* 10.0* 10.0*   HCT 34.7* 34.2* 34.8*    307 298   MCV 85 86 86   MCH 25.2* 25.1* 24.8*   MCHC 29.7* 29.2* 28.7*     BNP:  Recent Labs   Lab 12/04/19  0356 12/06/19  0316 12/09/19  0333   * 231* 346*     CMP:  Recent Labs   Lab 12/04/19  0356  12/06/19  0316 12/07/19  0323 12/08/19  0334 12/09/19  0333   *   < > 129* 133* 139* 141*   CALCIUM 9.2   < > 9.2 9.2 9.2 9.1   ALBUMIN 3.3*  --  3.3*  --   --  3.4*   PROT 6.6  --  6.6  --   --  6.8      < > 140 140 140 139   K 4.4   < > 4.0 3.4* 3.6 4.0   CO2 27   < > 28 28 27 28      < > 101 100 101 99   BUN 33*   < > 28* 26* 25* 22*   CREATININE 2.0*   < > 1.9* 1.8* 1.7* 1.6*   ALKPHOS 85  --  91  --   --  89   ALT 12  --  14  --   --  16   AST 16  --  19  --   --  21   BILITOT 0.4  --  0.3  --   --  0.3    < > = values in this interval not displayed.      Coagulation:   Recent Labs   Lab 12/07/19  0323 12/08/19  0334 12/09/19  0333   INR 2.4* 2.5* 3.1*   APTT 33.7* 36.8* 38.3*     LDH:  Recent Labs   Lab 12/09/19  0828         Microbiology:  Microbiology Results (last 7 days)     ** No results found for the last 168 hours. **          I have reviewed all pertinent labs within the past 24 hours.    Estimated Creatinine Clearance: 52.3 mL/min (A) (based on SCr of 1.6 mg/dL (H)).    Diagnostic Results:  I have reviewed and interpreted all pertinent imaging results/findings within the past 24 hours.

## 2019-12-09 NOTE — PLAN OF CARE
CMICU DAILY GOALS       A: Awake    RASS: Goal - RASS Goal: 0-->alert and calm  Actual - RASS (Patel Agitation-Sedation Scale): 0-->alert and calm   Restraint necessity:    B: Breath   SBT: Not intubated   C: Coordinate A & B, analgesics/sedatives   Pain: managed    SAT: Not intubated  D: Delirium   CAM-ICU: Overall CAM-ICU: Negative  E: Early Mobility   MOVE Screen: Pass   Activity: Activity Management: activity adjusted per tolerance, activity clustered for rest period  FAS: Feeding/Nutrition   Diet order: Diet/Nutrition Received: low saturated fat/low cholesterol,   Fluid restriction: Fluid Requirement: 2L FR  T: Thrombus   DVT prophylaxis: VTE Required Core Measure: Pharmacological prophylaxis initiated/maintained  H: HOB Elevation   Head of Bed (HOB): HOB at 20 degrees  U: Ulcer Prophylaxis   GI: yes  G: Glucose control   managed    S: Skin   Bundle compliance: yes   Bathing/Skin Care: back care, bath, chlorhexidine, bath, complete, linen changed, shampoo, dressed/undressed Date: day bath  B: Bowel Function   no issues   I: Indwelling Catheters   Fonseca necessity:     CVC necessity: Yes   IPAD offered: Yes  D: De-escalation Antibx   No  Plan for the day   Awaiting transplant    Family/Goals of care/Code Status   Code Status: Prior     No acute events throughout day, VS and assessment per flow sheet, patient progressing towards goals as tolerated, plan of care reviewed with Deborah Navas and family, all concerns addressed, will continue to monitor.

## 2019-12-09 NOTE — PLAN OF CARE
No significant events during the day. AAOx4. HM 3. See VAD flowsheet for numbers. Driveline site 2.  No alarms. CVP 14, 13, &11. Nitric 20ppm 4L NC. No BM. Fair appetite. CO intermittent nausea. Phenergan given x2. Good U/O. Up to bedside commode. Gtts: Epi. POC for heart transplant. All concerns addressed. WCTM.         CMICU DAILY GOALS         A: Awake               RASS: Goal - RASS Goal: 0-->alert and calm  Actual - RASS (Patel Agitation-Sedation Scale): 0-->alert and calm              Restraint necessity:  no  B: Breath              SBT: Not intubated   C: Coordinate A & B, analgesics/sedatives              Pain: managed               SAT: Not intubated  D: Delirium              CAM-ICU: Overall CAM-ICU: Negative  E: Early Mobility              MOVE Screen: Pass              Activity: Activity Management: activity adjusted per tolerance  FAS: Feeding/Nutrition              Diet order: Diet/Nutrition Received: restrict fluids, low saturated fat/low cholesterol, 2 gram sodium,   Fluid restriction: Fluid Requirement: 2L FR  T: Thrombus              DVT prophylaxis: VTE Required Core Measure: Pharmacological prophylaxis initiated/maintained  H: HOB Elevation              Head of Bed (HOB): HOB at 30-45 degrees  U: Ulcer Prophylaxis              GI: yes  G: Glucose control  N/a     S: Skin              Bundle compliance: yes  Bathing/Skin Care: back care, bath, chlorhexidine, bath, complete, linen changed, dressed/undressed Date: 12/8/2019 during day shift  B: Bowel Function              no issues   I: Indwelling Catheters              Fonseca necessity:  no              CVC necessity: yes              IPAD offered: Yes  D: De-escalation Antibx              N/a   Plan for the day              Awaiting transplant   Family/Goals of care/Code Status              Code Status: Prior                No acute events throughout day, VS and assessment per flow sheet, patient progressing towards goals as tolerated, plan of  care reviewed with Deborah Navas and family, all concerns addressed, will continue to monitor.

## 2019-12-09 NOTE — NURSING
Pt sitting up in bed, SW and RN at bedside. Pt pleasant and cheerful this morning, requesting pet therapy, SW to arrange. Pt also asking if a Olivia would be visiting patients for [place]. Explained to patient that I will be bring around Olivia and Mrs. Huerta on Thursday, 12/12, before or after the VAD [place] party. Pt happy about this.  VAD Parameters WNL, no alarms on interrogation.

## 2019-12-09 NOTE — PROGRESS NOTES
"  [facility]  Adult Nutrition  Consult Note    SUMMARY     Recommendations    1. Continue current Cardiac diet, fluid restriction per MD.  2. RD to monitor & follow-up.    Goals: PO intake >50%  Nutrition Goal Status: goal met  Communication of RD Recs: reviewed with RN    Reason for Assessment    Reason For Assessment: RD follow-up  Diagnosis: other (see comments)(LVAD present)  Relevant Medical History: LVAD, HLD  Interdisciplinary Rounds: attended    General Information Comments: Pt w/ nausea & vomiting this AM. PTA, pt w/ good appetite & stable wt. NFPE not indicated, pt appears nourished w/ no physical signs of malnutrition.   Nutrition Discharge Planning: Adequate PO intake    Nutrition/Diet History    Patient Reported Diet/Restrictions/Preferences: low salt  Spiritual, Cultural Beliefs, Caodaism Practices, Values that Affect Care: no  Factors Affecting Nutritional Intake: None identified at this time    Anthropometrics    Temp: 98.4 °F (36.9 °C)  Height Method: Measured  Height: 5' 6.75" (169.5 cm)  Height (inches): 66.75 in  Weight Method: Bed Scale  Weight: 105.3 kg (232 lb 2.3 oz)  Weight (lb): 232.15 lb  Ideal Body Weight (IBW), Female: 133.75 lb  % Ideal Body Weight, Female (lb): 173.57 lb  BMI (Calculated): 36.7  BMI Grade: 35 - 39.9 - obesity - grade II    Lab/Procedures/Meds    Pertinent Labs Reviewed: reviewed  Pertinent Labs Comments: BUN 22, Creat 1.6, GFR 37.3  Pertinent Medications Reviewed: reviewed  Pertinent Medications Comments: Warfarin    Estimated/Assessed Needs    Weight Used For Calorie Calculations: 105.3 kg (232 lb 2.3 oz)     Energy Calorie Requirements (kcal): 2136 kcal/d  Energy Need Method: Reynolds-St Jeor(1.25 PAL)     Protein Requirements: 106 g/d (1 g/kg)  Weight Used For Protein Calculations: 105.6 kg (232 lb 12.9 oz)     Estimated Fluid Requirement Method: other (see comments)(Per MD or 1 mL/kcal)     Nutrition Prescription Ordered    Current Diet Order: " Cardiac  Nutrition Order Comments: 2000 mL FR    Evaluation of Received Nutrient/Fluid Intake    Comments: LBM: 12/8    Tolerance: tolerating    Nutrition Risk    Level of Risk/Frequency of Follow-up: (1x/week)     Assessment and Plan    No nutritional dx at this time.      Monitor and Evaluation    Food and Nutrient Intake: energy intake, food and beverage intake  Food and Nutrient Adminstration: diet order  Physical Activity and Function: nutrition-related ADLs and IADLs  Anthropometric Measurements: weight, weight change  Biochemical Data, Medical Tests and Procedures: inflammatory profile, lipid profile, glucose/endocrine profile, gastrointestinal profile, electrolyte and renal panel  Nutrition-Focused Physical Findings: overall appearance     Nutrition Follow-Up    RD Follow-up?: Yes

## 2019-12-09 NOTE — ASSESSMENT & PLAN NOTE
- has intermittent nausea/vomiting, likely multifactorial with RVF and mexiletine  - on BID PPI as well as sucralfate, will add H2 blocker today  - judicious use of zofran and phenergan due to hx of VT storm

## 2019-12-09 NOTE — PROGRESS NOTES
[facility]  Heart Transplant  Progress Note    Patient Name: Deborah Navas  MRN: [id number]  Admission Date: 11/13/2019  Hospital Length of Stay: 24 days  Attending Physician: Vi Bryant MD  Primary Care Provider: Primary Doctor No  Principal Problem:Left ventricular assist device (LVAD) complication    Subjective:     Interval History: Doing well this AM, complaining on intermittent nausea. Vomited X1. With some relief. BM two days ago. Denies chest pain, SOB. Was able to johanny up to bedside commode without dizziness or issues with flows.     Continuous Infusions:   epinephrine 0.02 mcg/kg/min (12/09/19 1000)    nitric oxide gas       Scheduled Meds:   amiodarone  400 mg Oral BID    aspirin  325 mg Oral Daily    famotidine  20 mg Oral QHS    furosemide  80 mg Intravenous TID    gabapentin  200 mg Oral BID    magnesium oxide  400 mg Oral Daily    mexiletine  200 mg Oral Q8H    mirtazapine  15 mg Oral QHS    pantoprazole  40 mg Oral BID AC    polyethylene glycol  17 g Oral Daily    potassium chloride  40 mEq Oral BID    senna-docusate 8.6-50 mg  1 tablet Oral BID    sucralfate  1 g Oral QID (AC & HS)    venlafaxine  150 mg Oral Daily     PRN Meds:ALPRAZolam, ondansetron, pneumoc 13-amber conj-dip cr(PF), promethazine, sodium chloride, white petrolatum, zolpidem    Review of patient's allergies indicates:   Allergen Reactions    Adhesive Blisters     Reaction to area in chest and up only    Codeine Itching     Objective:     Vital Signs (Most Recent):  Temp: 97.5 °F (36.4 °C) (12/09/19 0800)  Pulse: 92 (12/09/19 1000)  Resp: 20 (12/09/19 1000)  BP: (!) 88/0 (12/09/19 0835)  SpO2: 98 % (12/09/19 0742) Vital Signs (24h Range):  Temp:  [97.5 °F (36.4 °C)-98.5 °F (36.9 °C)] 97.5 °F (36.4 °C)  Pulse:  [79-93] 92  Resp:  [16-59] 20  SpO2:  [98 %-100 %] 98 %  BP: (74-88)/(0) 88/0     Patient Vitals for the past 72 hrs (Last 3 readings):   Weight   12/09/19 0300 105.3 kg (232  lb 2.3 oz)   12/08/19 0300 106.1 kg (233 lb 14.5 oz)   12/07/19 1100 104.2 kg (229 lb 11.5 oz)     Body mass index is 36.63 kg/m².      Intake/Output Summary (Last 24 hours) at 12/9/2019 1044  Last data filed at 12/9/2019 1000  Gross per 24 hour   Intake 1019 ml   Output 2700 ml   Net -1681 ml       Hemodynamic Parameters:  CVP:  [11 mmHg-13 mmHg] 13 mmHg    Physical Exam   Constitutional: She is oriented to person, place, and time. She appears well-developed and well-nourished.   HENT:   Mouth/Throat: Oropharynx is clear and moist.   Eyes: EOM are normal.   Neck: No JVD present.   Cardiovascular: Normal rate, regular rhythm and intact distal pulses.   Smooth VAD hum   Pulmonary/Chest: Effort normal and breath sounds normal. No respiratory distress. She has no rales.   Abdominal: Soft. Bowel sounds are normal. She exhibits no distension. There is no tenderness. There is no guarding.   Musculoskeletal: She exhibits no edema.   Neurological: She is alert and oriented to person, place, and time.   Skin: Skin is warm.   Psychiatric: She has a normal mood and affect.   Nursing note and vitals reviewed.      Significant Labs:  CBC:  Recent Labs   Lab 12/07/19  0323 12/08/19  0334 12/09/19  0333   WBC 5.62 5.78 6.53   RBC 4.08 3.99* 4.04   HGB 10.3* 10.0* 10.0*   HCT 34.7* 34.2* 34.8*    307 298   MCV 85 86 86   MCH 25.2* 25.1* 24.8*   MCHC 29.7* 29.2* 28.7*     BNP:  Recent Labs   Lab 12/04/19  0356 12/06/19  0316 12/09/19  0333   * 231* 346*     CMP:  Recent Labs   Lab 12/04/19  0356  12/06/19  0316 12/07/19  0323 12/08/19  0334 12/09/19  0333   *   < > 129* 133* 139* 141*   CALCIUM 9.2   < > 9.2 9.2 9.2 9.1   ALBUMIN 3.3*  --  3.3*  --   --  3.4*   PROT 6.6  --  6.6  --   --  6.8      < > 140 140 140 139   K 4.4   < > 4.0 3.4* 3.6 4.0   CO2 27   < > 28 28 27 28      < > 101 100 101 99   BUN 33*   < > 28* 26* 25* 22*   CREATININE 2.0*   < > 1.9* 1.8* 1.7* 1.6*   ALKPHOS 85  --  91  --    --  89   ALT 12  --  14  --   --  16   AST 16  --  19  --   --  21   BILITOT 0.4  --  0.3  --   --  0.3    < > = values in this interval not displayed.      Coagulation:   Recent Labs   Lab 12/07/19  0323 12/08/19  0334 12/09/19  0333   INR 2.4* 2.5* 3.1*   APTT 33.7* 36.8* 38.3*     LDH:  Recent Labs   Lab 12/09/19  0828        Microbiology:  Microbiology Results (last 7 days)     ** No results found for the last 168 hours. **          I have reviewed all pertinent labs within the past 24 hours.    Estimated Creatinine Clearance: 52.3 mL/min (A) (based on SCr of 1.6 mg/dL (H)).    Diagnostic Results:  I have reviewed and interpreted all pertinent imaging results/findings within the past 24 hours.    Assessment and Plan:     48 yo WF with NICM, s/p HM3 implantation 9/10/19 (post up coarse uncomplicated with the exception of+ diaphragmatic stimulation of LV lead and pleural effusion with chest tube placement, atrial flutter with NADINE/DCCV), V-fib reported in setting of hypokalemia with appropriate shock from ICD on Amiodarone prior to LVAD placement; and recent hospitalizations for ventricular arrythmias; started on Mexilitine.  She was seen in EP clinic today and she continues to have multiple VT episodes with some ATP therapy, although no ICD shocks since starting mexiletine 10/24/2019. One slow VT episode 2.5hrs 11/3/2019. Patient asymptomatic with LVAD. On coumadin. No AFL since post-op event (paced out of rhythm 9/24/2019). CHB with 100% V pacing (LV lead off). She does not feel well. Dry heaving with mexiletine. Taking phenergan PRN. 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   She was seen in HTS clinic and reported 4 low flow alarms the last 4 nights.  She reports they occur in her sleep and last for only a few seconds.  By the time she wakes up; they quickly stop.  She does report to possibly being little volume overloaded.  She hasn't noticed weight gain at home but  thinks she may have little extra fluid.  She denies SOB, chest pain, palpitations, LEGER. In review of her records: she was 223lbs on 10/24/19 during previous hospitalization and is 235lbs now.  Her lasix had previously been decreased to prn.     * Left ventricular assist device (LVAD) complication  - hx low flow alarms prior to admit, last LFA 12/1  - Possibly secondary to ADHF--> RV failure  - Formal report of CT unremarkable; however, it was a non contrast as creat was elevated above baseline on admit and inflow and outflow cannulas not accurately visualized    Nausea  - has intermittent nausea/vomiting, likely multifactorial with RVF and mexiletine  - on BID PPI as well as sucralfate, will add H2 blocker today  - judicious use of zofran and phenergan due to hx of VT storm    GLO (acute kidney injury)  - see CKD    Organ transplant candidate  - will list with exemption due to VT with ICD shocks and RV failure (will not tolerate ) and does not tolerate NO wean  - approved for transplant on 11/26. Tier 2.     Essential hypertension  -Patient is non-pulsatile  -Doppler goal 60-90 mmHg, currently controlled  -Antihypertensive medications include: none      Ventricular tachycardia  - Per EP office visit on day of admission: She continues to have multiple VT episodes with some ATP therapy, although no ICD shocks since starting mexiletine 10/24/2019. One slow VT episode 2.5hrs 11/3/2019. Patient asymptomatic with LVAD. On coumadin. No AFL since post-op event (paced out of rhythm 9/24/2019). CHB with 100% V pacing (LV lead off).   - Plan was to continue current regimen despite Mexiletine making her nauseous.  - Decreased Amiodarone to daily per EP plan on discharge last hospitalization   - Shocked x 3 11/25 for MMVT (in addition has had numerous successful ATP's for MMVT)   - S/P amio load   - now on Amio 400 BID and mexiletine increased to 200    LVAD (left ventricular assist device) present  - HeartMate 3 Implanted  9/10/19 as DT.  - Implanted by Dr. Walden  - INR  Theraputic.   - per Dr Lua will d/c coumadin, let drift down and bridge with heparin in preparation for OHTx  - Antiplatelets:  mg.  - LDH is stable Will monitor daily.   - Speed set at 5100  - Epi @ .02, NO @ 20.   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 Urgent selection 11/26, approved for transplant. Listed tier 2.    Procedure: Device Interrogation Including analysis of device parameters  Current Settings: Ventricular Assist Device  Review of device function is stable    TXP LVAD INTERROGATIONS 12/9/2019 12/9/2019 12/9/2019 12/9/2019 12/9/2019 12/9/2019 12/9/2019   Type HeartMate3 HeartMate3 HeartMate3 HeartMate3 HeartMate3 HeartMate3 HeartMate3   Flow 4.1 3.4 3.9 3.8 3.9 4.0 4.4   Speed 5100 5100 5100 5150 5150 5100 5150   PI 3.6 6.2 4.2 4.7 4.5 3.9 2.4   Power (Orellana) 3.5 3.4 3.6 3.5 3.4 3.6 3.6   LSL - - - 4700 4700 4700 4700   Pulsatility - - Intermittent pulse - No Pulse No Pulse No Pulse       CKD (chronic kidney disease)  - Baseline creat appears 1.3-1.6. 1.6 today.  - Will monitor with diuresis     Acute on chronic combined systolic and diastolic heart failure  - NICM  - Last 2D Echo 12/4/19. AV does not open, LVedd 5.5cm @ 5100. - IV push lasix today 80 TID   - EPI .02, NO @ 20   - CVP 10 and SvO2 61 this AM  - GDMT: D/C ACE 10 BID,  & aldactone   - was on lasix 20 PRN at home PTA  -2g Na dietary restriction, 1500 mL fluid restriction, strict I/Os      ICD (implantable cardioverter-defibrillator) in place  - Medtronic ICD  - Shocked x 3 11/25 for MMVT   - Loaded with IV amio on 11/25. Now on amio 400 PO BID and mexiletine increased to 200 QD   - See above VT        Julieth Ramos PA-C  Heart Transplant  [facility]    Uninterrupted Critical Care/Counseling Time (not including procedures): 45 minutes

## 2019-12-09 NOTE — ASSESSMENT & PLAN NOTE
-Patient is non-pulsatile  -Doppler goal 60-90 mmHg, currently controlled  -Antihypertensive medications include: none

## 2019-12-10 ENCOUNTER — DOCUMENTATION ONLY (OUTPATIENT)
Dept: CARDIOLOGY | Facility: HOSPITAL | Age: 49
End: 2019-12-10

## 2019-12-10 LAB
ALLENS TEST: ABNORMAL
ALLENS TEST: ABNORMAL
ANION GAP SERPL CALC-SCNC: 9 MMOL/L (ref 8–16)
APTT BLDCRRT: 40.5 SEC (ref 21–32)
BASOPHILS # BLD AUTO: 0.05 K/UL (ref 0–0.2)
BASOPHILS NFR BLD: 0.7 % (ref 0–1.9)
BUN SERPL-MCNC: 21 MG/DL (ref 6–20)
CALCIUM SERPL-MCNC: 9.1 MG/DL (ref 8.7–10.5)
CHLORIDE SERPL-SCNC: 100 MMOL/L (ref 95–110)
CO2 SERPL-SCNC: 30 MMOL/L (ref 23–29)
CREAT SERPL-MCNC: 1.8 MG/DL (ref 0.5–1.4)
DELSYS: ABNORMAL
DIFFERENTIAL METHOD: ABNORMAL
EOSINOPHIL # BLD AUTO: 0.2 K/UL (ref 0–0.5)
EOSINOPHIL NFR BLD: 2.9 % (ref 0–8)
ERYTHROCYTE [DISTWIDTH] IN BLOOD BY AUTOMATED COUNT: 17 % (ref 11.5–14.5)
EST. GFR  (AFRICAN AMERICAN): 37.3 ML/MIN/1.73 M^2
EST. GFR  (NON AFRICAN AMERICAN): 32.4 ML/MIN/1.73 M^2
GLUCOSE SERPL-MCNC: 144 MG/DL (ref 70–110)
HCO3 UR-SCNC: 32.1 MMOL/L (ref 24–28)
HCO3 UR-SCNC: 33.4 MMOL/L (ref 24–28)
HCT VFR BLD AUTO: 34.7 % (ref 37–48.5)
HGB BLD-MCNC: 9.9 G/DL (ref 12–16)
IMM GRANULOCYTES # BLD AUTO: 0.01 K/UL (ref 0–0.04)
IMM GRANULOCYTES NFR BLD AUTO: 0.1 % (ref 0–0.5)
INR PPP: 3.7 (ref 0.8–1.2)
LDH SERPL L TO P-CCNC: 223 U/L (ref 110–260)
LYMPHOCYTES # BLD AUTO: 1.2 K/UL (ref 1–4.8)
LYMPHOCYTES NFR BLD: 17.3 % (ref 18–48)
MAGNESIUM SERPL-MCNC: 2.2 MG/DL (ref 1.6–2.6)
MCH RBC QN AUTO: 24.6 PG (ref 27–31)
MCHC RBC AUTO-ENTMCNC: 28.5 G/DL (ref 32–36)
MCV RBC AUTO: 86 FL (ref 82–98)
METHEMOGLOBIN: 0.3 % (ref 0–3)
MONOCYTES # BLD AUTO: 0.5 K/UL (ref 0.3–1)
MONOCYTES NFR BLD: 7.7 % (ref 4–15)
NEUTROPHILS # BLD AUTO: 4.9 K/UL (ref 1.8–7.7)
NEUTROPHILS NFR BLD: 71.3 % (ref 38–73)
NRBC BLD-RTO: 0 /100 WBC
PCO2 BLDA: 56.3 MMHG (ref 35–45)
PCO2 BLDA: 58.9 MMHG (ref 35–45)
PH SMN: 7.36 [PH] (ref 7.35–7.45)
PH SMN: 7.36 [PH] (ref 7.35–7.45)
PHOSPHATE SERPL-MCNC: 3.7 MG/DL (ref 2.7–4.5)
PLATELET # BLD AUTO: 290 K/UL (ref 150–350)
PMV BLD AUTO: 9.9 FL (ref 9.2–12.9)
PO2 BLDA: 25 MMHG (ref 40–60)
PO2 BLDA: 35 MMHG (ref 40–60)
POC BE: 7 MMOL/L
POC BE: 8 MMOL/L
POC SATURATED O2: 42 % (ref 95–100)
POC SATURATED O2: 62 % (ref 95–100)
POC TCO2: 34 MMOL/L (ref 24–29)
POC TCO2: 35 MMOL/L (ref 24–29)
POTASSIUM SERPL-SCNC: 4.1 MMOL/L (ref 3.5–5.1)
PROTHROMBIN TIME: 36.3 SEC (ref 9–12.5)
RBC # BLD AUTO: 4.02 M/UL (ref 4–5.4)
SAMPLE: ABNORMAL
SAMPLE: ABNORMAL
SITE: ABNORMAL
SITE: ABNORMAL
SODIUM SERPL-SCNC: 139 MMOL/L (ref 136–145)
WBC # BLD AUTO: 6.92 K/UL (ref 3.9–12.7)

## 2019-12-10 PROCEDURE — 83615 LACTATE (LD) (LDH) ENZYME: CPT | Mod: NTX

## 2019-12-10 PROCEDURE — 20000000 HC ICU ROOM: Mod: NTX

## 2019-12-10 PROCEDURE — 99291 CRITICAL CARE FIRST HOUR: CPT | Mod: NTX,,, | Performed by: PHYSICIAN ASSISTANT

## 2019-12-10 PROCEDURE — 82803 BLOOD GASES ANY COMBINATION: CPT | Mod: NTX

## 2019-12-10 PROCEDURE — 94761 N-INVAS EAR/PLS OXIMETRY MLT: CPT | Mod: NTX

## 2019-12-10 PROCEDURE — 25000003 PHARM REV CODE 250: Mod: NTX | Performed by: PHYSICIAN ASSISTANT

## 2019-12-10 PROCEDURE — 84100 ASSAY OF PHOSPHORUS: CPT | Mod: NTX

## 2019-12-10 PROCEDURE — 97530 THERAPEUTIC ACTIVITIES: CPT | Mod: NTX

## 2019-12-10 PROCEDURE — 85610 PROTHROMBIN TIME: CPT | Mod: NTX

## 2019-12-10 PROCEDURE — 63600175 PHARM REV CODE 636 W HCPCS: Mod: NTX | Performed by: STUDENT IN AN ORGANIZED HEALTH CARE EDUCATION/TRAINING PROGRAM

## 2019-12-10 PROCEDURE — 85025 COMPLETE CBC W/AUTO DIFF WBC: CPT | Mod: NTX

## 2019-12-10 PROCEDURE — 27000221 HC OXYGEN, UP TO 24 HOURS: Mod: NTX

## 2019-12-10 PROCEDURE — 83735 ASSAY OF MAGNESIUM: CPT | Mod: NTX

## 2019-12-10 PROCEDURE — 27000248 HC VAD-ADDITIONAL DAY: Mod: NTX

## 2019-12-10 PROCEDURE — 99900035 HC TECH TIME PER 15 MIN (STAT): Mod: NTX

## 2019-12-10 PROCEDURE — 25000003 PHARM REV CODE 250: Mod: NTX | Performed by: INTERNAL MEDICINE

## 2019-12-10 PROCEDURE — 63600367 HC NITRIC OXIDE PER HOUR: Mod: NTX

## 2019-12-10 PROCEDURE — 99291 PR CRITICAL CARE, E/M 30-74 MINUTES: ICD-10-PCS | Mod: NTX,,, | Performed by: PHYSICIAN ASSISTANT

## 2019-12-10 PROCEDURE — 93750 INTERROGATION VAD IN PERSON: CPT | Mod: NTX,,, | Performed by: INTERNAL MEDICINE

## 2019-12-10 PROCEDURE — 97116 GAIT TRAINING THERAPY: CPT | Mod: NTX

## 2019-12-10 PROCEDURE — 93750 PR INTERROGATE VENT ASSIST DEV, IN PERSON, W PHYSICIAN ANALYSIS: ICD-10-PCS | Mod: NTX,,, | Performed by: INTERNAL MEDICINE

## 2019-12-10 PROCEDURE — 85730 THROMBOPLASTIN TIME PARTIAL: CPT | Mod: NTX

## 2019-12-10 PROCEDURE — 80048 BASIC METABOLIC PNL TOTAL CA: CPT | Mod: NTX

## 2019-12-10 RX ADMIN — MIRTAZAPINE 15 MG: 15 TABLET, FILM COATED ORAL at 09:12

## 2019-12-10 RX ADMIN — MEXILETINE HYDROCHLORIDE 200 MG: 200 CAPSULE ORAL at 01:12

## 2019-12-10 RX ADMIN — SUCRALFATE 1 G: 1 SUSPENSION ORAL at 12:12

## 2019-12-10 RX ADMIN — MEXILETINE HYDROCHLORIDE 200 MG: 200 CAPSULE ORAL at 09:12

## 2019-12-10 RX ADMIN — POTASSIUM CHLORIDE 40 MEQ: 1500 TABLET, EXTENDED RELEASE ORAL at 08:12

## 2019-12-10 RX ADMIN — ZOLPIDEM TARTRATE 5 MG: 5 TABLET ORAL at 09:12

## 2019-12-10 RX ADMIN — SENNOSIDES AND DOCUSATE SODIUM 1 TABLET: 8.6; 5 TABLET ORAL at 09:12

## 2019-12-10 RX ADMIN — MAGNESIUM OXIDE TAB 400 MG (241.3 MG ELEMENTAL MG) 400 MG: 400 (241.3 MG) TAB at 08:12

## 2019-12-10 RX ADMIN — AMIODARONE HYDROCHLORIDE 400 MG: 200 TABLET ORAL at 08:12

## 2019-12-10 RX ADMIN — FUROSEMIDE 80 MG: 10 INJECTION, SOLUTION INTRAMUSCULAR; INTRAVENOUS at 12:12

## 2019-12-10 RX ADMIN — FUROSEMIDE 80 MG: 10 INJECTION, SOLUTION INTRAMUSCULAR; INTRAVENOUS at 03:12

## 2019-12-10 RX ADMIN — VENLAFAXINE 150 MG: 37.5 TABLET ORAL at 08:12

## 2019-12-10 RX ADMIN — FUROSEMIDE 80 MG: 10 INJECTION, SOLUTION INTRAMUSCULAR; INTRAVENOUS at 09:12

## 2019-12-10 RX ADMIN — FUROSEMIDE 80 MG: 10 INJECTION, SOLUTION INTRAMUSCULAR; INTRAVENOUS at 08:12

## 2019-12-10 RX ADMIN — POTASSIUM CHLORIDE 40 MEQ: 1500 TABLET, EXTENDED RELEASE ORAL at 09:12

## 2019-12-10 RX ADMIN — POLYETHYLENE GLYCOL 3350 17 G: 17 POWDER, FOR SOLUTION ORAL at 08:12

## 2019-12-10 RX ADMIN — PANTOPRAZOLE SODIUM 40 MG: 40 TABLET, DELAYED RELEASE ORAL at 06:12

## 2019-12-10 RX ADMIN — AMIODARONE HYDROCHLORIDE 400 MG: 200 TABLET ORAL at 09:12

## 2019-12-10 RX ADMIN — ASPIRIN 325 MG: 325 TABLET, DELAYED RELEASE ORAL at 08:12

## 2019-12-10 RX ADMIN — SENNOSIDES AND DOCUSATE SODIUM 1 TABLET: 8.6; 5 TABLET ORAL at 08:12

## 2019-12-10 RX ADMIN — ALPRAZOLAM 0.25 MG: 0.25 TABLET ORAL at 01:12

## 2019-12-10 RX ADMIN — SUCRALFATE 1 G: 1 SUSPENSION ORAL at 08:12

## 2019-12-10 RX ADMIN — MEXILETINE HYDROCHLORIDE 200 MG: 200 CAPSULE ORAL at 06:12

## 2019-12-10 RX ADMIN — PANTOPRAZOLE SODIUM 40 MG: 40 TABLET, DELAYED RELEASE ORAL at 03:12

## 2019-12-10 RX ADMIN — FAMOTIDINE 20 MG: 20 TABLET ORAL at 09:12

## 2019-12-10 RX ADMIN — GABAPENTIN 200 MG: 100 CAPSULE ORAL at 09:12

## 2019-12-10 RX ADMIN — GABAPENTIN 200 MG: 100 CAPSULE ORAL at 08:12

## 2019-12-10 NOTE — ASSESSMENT & PLAN NOTE
- NICM  - Last 2D Echo 12/4/19. AV does not open, LVedd 5.5cm @ 5100.   - IV push lasix today 80 TID   - EPI .02, NO @ 20   - CVP 14 and SvO2 62 this AM  - GDMT: D/C ACE 10 BID,  & aldactone   - was on lasix 20 PRN at home PTA  -2g Na dietary restriction, 1500 mL fluid restriction, strict I/Os

## 2019-12-10 NOTE — ASSESSMENT & PLAN NOTE
- has intermittent nausea/vomiting, likely multifactorial with RVF and mexiletine  - on BID PPI as well as sucralfate, and H2 blocker   - judicious use of zofran and phenergan due to hx of VT storm

## 2019-12-10 NOTE — ASSESSMENT & PLAN NOTE
- Per EP office visit on day of admission: She continues to have multiple VT episodes with some ATP therapy, although no ICD shocks since starting mexiletine 10/24/2019. One slow VT episode 2.5hrs 11/3/2019. Patient asymptomatic with LVAD. On coumadin. No AFL since post-op event (paced out of rhythm 9/24/2019). CHB with 100% V pacing (LV lead off).   - Plan was to continue current regimen despite Mexiletine making her nauseous.  - Decreased Amiodarone to daily per EP plan on discharge last hospitalization   - Shocked x 3 11/25 for MMVT (in addition has had numerous successful ATP's for MMVT)   - S/P amio load   - now on Amio 400 BID and mexiletine increased to 200  - ICD interrogation today due to palpitations per Dr Lua

## 2019-12-10 NOTE — SUBJECTIVE & OBJECTIVE
"Interval History: Doing well this AM, nausea improved since yesterday. C/O some "thumping" in her L sided chest which is similar to when she was having VT. Some NSVT on tele. Will interrogate ICD today. Otherwise no complaints and in good spirits.     Continuous Infusions:   epinephrine 0.02 mcg/kg/min (12/10/19 1000)    nitric oxide gas       Scheduled Meds:   amiodarone  400 mg Oral BID    aspirin  325 mg Oral Daily    famotidine  20 mg Oral QHS    furosemide  80 mg Intravenous TID    gabapentin  200 mg Oral BID    magnesium oxide  400 mg Oral Daily    mexiletine  200 mg Oral Q8H    mirtazapine  15 mg Oral QHS    pantoprazole  40 mg Oral BID AC    polyethylene glycol  17 g Oral Daily    potassium chloride  40 mEq Oral BID    senna-docusate 8.6-50 mg  1 tablet Oral BID    sucralfate  1 g Oral QID (AC & HS)    venlafaxine  150 mg Oral Daily     PRN Meds:ALPRAZolam, ondansetron, pneumoc 13-amber conj-dip cr(PF), promethazine, sodium chloride, white petrolatum, zolpidem    Review of patient's allergies indicates:   Allergen Reactions    Adhesive Blisters     Reaction to area in chest and up only    Codeine Itching     Objective:     Vital Signs (Most Recent):  Temp: 98.3 °F (36.8 °C) (12/10/19 0730)  Pulse: 91 (12/10/19 1000)  Resp: (!) 27 (12/10/19 1000)  BP: (!) 75/0 (12/10/19 0730)  SpO2: 98 % (12/10/19 0821) Vital Signs (24h Range):  Temp:  [97.7 °F (36.5 °C)-98.4 °F (36.9 °C)] 98.3 °F (36.8 °C)  Pulse:  [] 91  Resp:  [17-32] 27  SpO2:  [97 %-99 %] 98 %  BP: (62-86)/(0) 75/0     Patient Vitals for the past 72 hrs (Last 3 readings):   Weight   12/09/19 1300 105.3 kg (232 lb 2.3 oz)   12/09/19 0300 105.3 kg (232 lb 2.3 oz)   12/08/19 0300 106.1 kg (233 lb 14.5 oz)     Body mass index is 36.63 kg/m².      Intake/Output Summary (Last 24 hours) at 12/10/2019 1039  Last data filed at 12/10/2019 1000  Gross per 24 hour   Intake 829 ml   Output 2200 ml   Net -1371 ml       Hemodynamic Parameters:   "     Physical Exam   Constitutional: She is oriented to person, place, and time. She appears well-developed and well-nourished.   HENT:   Mouth/Throat: Oropharynx is clear and moist.   Eyes: EOM are normal.   Neck: JVD present.   Cardiovascular: Normal rate, regular rhythm and intact distal pulses.   Smooth VAD hum   Pulmonary/Chest: Effort normal and breath sounds normal. No respiratory distress. She has no rales.   Abdominal: Soft. Bowel sounds are normal. She exhibits no distension. There is no tenderness. There is no guarding.   Musculoskeletal: She exhibits no edema.   Neurological: She is alert and oriented to person, place, and time.   Skin: Skin is warm.   Psychiatric: She has a normal mood and affect.   Nursing note and vitals reviewed.      Significant Labs:  CBC:  Recent Labs   Lab 12/08/19  0334 12/09/19  0333 12/10/19  0448   WBC 5.78 6.53 6.92   RBC 3.99* 4.04 4.02   HGB 10.0* 10.0* 9.9*   HCT 34.2* 34.8* 34.7*    298 290   MCV 86 86 86   MCH 25.1* 24.8* 24.6*   MCHC 29.2* 28.7* 28.5*     BNP:  Recent Labs   Lab 12/04/19  0356 12/06/19  0316 12/09/19  0333   * 231* 346*     CMP:  Recent Labs   Lab 12/04/19  0356  12/06/19  0316  12/08/19  0334 12/09/19  0333 12/10/19  0448   *   < > 129*   < > 139* 141* 144*   CALCIUM 9.2   < > 9.2   < > 9.2 9.1 9.1   ALBUMIN 3.3*  --  3.3*  --   --  3.4*  --    PROT 6.6  --  6.6  --   --  6.8  --       < > 140   < > 140 139 139   K 4.4   < > 4.0   < > 3.6 4.0 4.1   CO2 27   < > 28   < > 27 28 30*      < > 101   < > 101 99 100   BUN 33*   < > 28*   < > 25* 22* 21*   CREATININE 2.0*   < > 1.9*   < > 1.7* 1.6* 1.8*   ALKPHOS 85  --  91  --   --  89  --    ALT 12  --  14  --   --  16  --    AST 16  --  19  --   --  21  --    BILITOT 0.4  --  0.3  --   --  0.3  --     < > = values in this interval not displayed.      Coagulation:   Recent Labs   Lab 12/08/19  0334 12/09/19  0333 12/10/19  0448   INR 2.5* 3.1* 3.7*   APTT 36.8* 38.3* 40.5*      LDH:  Recent Labs   Lab 12/09/19  0828 12/10/19  0448    223     Microbiology:  Microbiology Results (last 7 days)     ** No results found for the last 168 hours. **          I have reviewed all pertinent labs within the past 24 hours.    Estimated Creatinine Clearance: 46.5 mL/min (A) (based on SCr of 1.8 mg/dL (H)).    Diagnostic Results:  I have reviewed and interpreted all pertinent imaging results/findings within the past 24 hours.

## 2019-12-10 NOTE — ASSESSMENT & PLAN NOTE
- HeartMate 3 Implanted 9/10/19 as DT.  - Implanted by Dr. Walden  - INR supra theraputic.   - per Dr Lua will d/c coumadin, let drift down and bridge with heparin in preparation for OHTx  - Antiplatelets:  mg.  - LDH is stable Will monitor daily.   - Speed set at 5100  - Epi @ .02, NO @ 20.   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 Urgent selection 11/26, approved for transplant. Listed tier 2.    Procedure: Device Interrogation Including analysis of device parameters  Current Settings: Ventricular Assist Device  Review of device function is stable    TXP LVAD INTERROGATIONS 12/10/2019 12/10/2019 12/10/2019 12/10/2019 12/10/2019 12/10/2019 12/10/2019   Type HeartMate3 HeartMate3 HeartMate3 HeartMate3 HeartMate3 HeartMate3 HeartMate3   Flow 4.0 3.9 3.4 3.4 3.9 3.9 3.8   Speed 5100 5100 5100 5100 5100 5100 5100   PI 3.7 4.4 5.8 5.9 3.8 4.1 3.8   Power (Orellana) 3.5 3.6 3.4 3.5 3.6 3.5 3.4   LSL 4700 4700 4700 4700 - - -   Pulsatility Intermittent pulse Intermittent pulse Intermittent pulse Intermittent pulse Intermittent pulse Intermittent pulse -

## 2019-12-10 NOTE — PLAN OF CARE
CMICU DAILY GOALS       A: Awake    RASS: Goal - RASS Goal: 0-->alert and calm  Actual - RASS (Patel Agitation-Sedation Scale): 0-->alert and calm   Restraint necessity:    B: Breath   SBT: Not intubated   C: Coordinate A & B, analgesics/sedatives   Pain: managed    SAT: Not intubated  D: Delirium   CAM-ICU: Overall CAM-ICU: Negative  E: Early Mobility   MOVE Screen: Pass   Activity: Activity Management: activity adjusted per tolerance  FAS: Feeding/Nutrition   Diet order: Diet/Nutrition Received: 2 gram sodium, restrict fluids,   Fluid restriction: Fluid Requirement: 2L FR  T: Thrombus   DVT prophylaxis: VTE Required Core Measure: Pharmacological prophylaxis initiated/maintained  H: HOB Elevation   Head of Bed (HOB): HOB at 30-45 degrees  U: Ulcer Prophylaxis   GI: yes  G: Glucose control   managed    S: Skin   Bundle compliance: yes   Bathing/Skin Care: bath, chlorhexidine, bath, complete, dressed/undressed, linen changed Date: 12/09/19  B: Bowel Function   no issues   I: Indwelling Catheters   Fonseca necessity:     CVC necessity: Yes   IPAD offered: Not appropriate - patient with own tablet, follows labs closely through staff  D: De-escalation Antibx   N/a   Plan for the day   Pt worked with PT, good PO intake, good uop and having regular BM's. AICD interrogated.   Family/Goals of care/Code Status   Code Status: Full Code     No acute events throughout day, VS and assessment per flow sheet, patient progressing towards goals as tolerated, plan of care reviewed with Deborah Navas and family, all concerns addressed, will continue to monitor.

## 2019-12-10 NOTE — PROGRESS NOTES
"[facility]  Heart Transplant  Progress Note    Patient Name: Deborah Navas  MRN: [id number]  Admission Date: 11/13/2019  Hospital Length of Stay: 25 days  Attending Physician: Vi Bryant MD  Primary Care Provider: Primary Doctor No  Principal Problem:Left ventricular assist device (LVAD) complication    Subjective:     Interval History: Doing well this AM, nausea improved since yesterday. C/O some "thumping" in her L sided chest which is similar to when she was having VT. Some NSVT on tele. Will interrogate ICD today. Otherwise no complaints and in good spirits.     Continuous Infusions:   epinephrine 0.02 mcg/kg/min (12/10/19 1000)    nitric oxide gas       Scheduled Meds:   amiodarone  400 mg Oral BID    aspirin  325 mg Oral Daily    famotidine  20 mg Oral QHS    furosemide  80 mg Intravenous TID    gabapentin  200 mg Oral BID    magnesium oxide  400 mg Oral Daily    mexiletine  200 mg Oral Q8H    mirtazapine  15 mg Oral QHS    pantoprazole  40 mg Oral BID AC    polyethylene glycol  17 g Oral Daily    potassium chloride  40 mEq Oral BID    senna-docusate 8.6-50 mg  1 tablet Oral BID    sucralfate  1 g Oral QID (AC & HS)    venlafaxine  150 mg Oral Daily     PRN Meds:ALPRAZolam, ondansetron, pneumoc 13-amber conj-dip cr(PF), promethazine, sodium chloride, white petrolatum, zolpidem    Review of patient's allergies indicates:   Allergen Reactions    Adhesive Blisters     Reaction to area in chest and up only    Codeine Itching     Objective:     Vital Signs (Most Recent):  Temp: 98.3 °F (36.8 °C) (12/10/19 0730)  Pulse: 91 (12/10/19 1000)  Resp: (!) 27 (12/10/19 1000)  BP: (!) 75/0 (12/10/19 0730)  SpO2: 98 % (12/10/19 0821) Vital Signs (24h Range):  Temp:  [97.7 °F (36.5 °C)-98.4 °F (36.9 °C)] 98.3 °F (36.8 °C)  Pulse:  [] 91  Resp:  [17-32] 27  SpO2:  [97 %-99 %] 98 %  BP: (62-86)/(0) 75/0     Patient Vitals for the past 72 hrs (Last 3 readings):   Weight "   12/09/19 1300 105.3 kg (232 lb 2.3 oz)   12/09/19 0300 105.3 kg (232 lb 2.3 oz)   12/08/19 0300 106.1 kg (233 lb 14.5 oz)     Body mass index is 36.63 kg/m².      Intake/Output Summary (Last 24 hours) at 12/10/2019 1039  Last data filed at 12/10/2019 1000  Gross per 24 hour   Intake 829 ml   Output 2200 ml   Net -1371 ml       Hemodynamic Parameters:       Physical Exam   Constitutional: She is oriented to person, place, and time. She appears well-developed and well-nourished.   HENT:   Mouth/Throat: Oropharynx is clear and moist.   Eyes: EOM are normal.   Neck: JVD present.   Cardiovascular: Normal rate, regular rhythm and intact distal pulses.   Smooth VAD hum   Pulmonary/Chest: Effort normal and breath sounds normal. No respiratory distress. She has no rales.   Abdominal: Soft. Bowel sounds are normal. She exhibits no distension. There is no tenderness. There is no guarding.   Musculoskeletal: She exhibits no edema.   Neurological: She is alert and oriented to person, place, and time.   Skin: Skin is warm.   Psychiatric: She has a normal mood and affect.   Nursing note and vitals reviewed.      Significant Labs:  CBC:  Recent Labs   Lab 12/08/19  0334 12/09/19  0333 12/10/19  0448   WBC 5.78 6.53 6.92   RBC 3.99* 4.04 4.02   HGB 10.0* 10.0* 9.9*   HCT 34.2* 34.8* 34.7*    298 290   MCV 86 86 86   MCH 25.1* 24.8* 24.6*   MCHC 29.2* 28.7* 28.5*     BNP:  Recent Labs   Lab 12/04/19  0356 12/06/19  0316 12/09/19  0333   * 231* 346*     CMP:  Recent Labs   Lab 12/04/19  0356  12/06/19 0316 12/08/19  0334 12/09/19  0333 12/10/19  0448   *   < > 129*   < > 139* 141* 144*   CALCIUM 9.2   < > 9.2   < > 9.2 9.1 9.1   ALBUMIN 3.3*  --  3.3*  --   --  3.4*  --    PROT 6.6  --  6.6  --   --  6.8  --       < > 140   < > 140 139 139   K 4.4   < > 4.0   < > 3.6 4.0 4.1   CO2 27   < > 28   < > 27 28 30*      < > 101   < > 101 99 100   BUN 33*   < > 28*   < > 25* 22* 21*   CREATININE 2.0*   < >  1.9*   < > 1.7* 1.6* 1.8*   ALKPHOS 85  --  91  --   --  89  --    ALT 12  --  14  --   --  16  --    AST 16  --  19  --   --  21  --    BILITOT 0.4  --  0.3  --   --  0.3  --     < > = values in this interval not displayed.      Coagulation:   Recent Labs   Lab 12/08/19  0334 12/09/19  0333 12/10/19  0448   INR 2.5* 3.1* 3.7*   APTT 36.8* 38.3* 40.5*     LDH:  Recent Labs   Lab 12/09/19  0828 12/10/19  0448    223     Microbiology:  Microbiology Results (last 7 days)     ** No results found for the last 168 hours. **          I have reviewed all pertinent labs within the past 24 hours.    Estimated Creatinine Clearance: 46.5 mL/min (A) (based on SCr of 1.8 mg/dL (H)).    Diagnostic Results:  I have reviewed and interpreted all pertinent imaging results/findings within the past 24 hours.    Assessment and Plan:     50 yo WF with NICM, s/p HM3 implantation 9/10/19 (post up coarse uncomplicated with the exception of+ diaphragmatic stimulation of LV lead and pleural effusion with chest tube placement, atrial flutter with NADINE/DCCV), V-fib reported in setting of hypokalemia with appropriate shock from ICD on Amiodarone prior to LVAD placement; and recent hospitalizations for ventricular arrythmias; started on Mexilitine.  She was seen in EP clinic today and she continues to have multiple VT episodes with some ATP therapy, although no ICD shocks since starting mexiletine 10/24/2019. One slow VT episode 2.5hrs 11/3/2019. Patient asymptomatic with LVAD. On coumadin. No AFL since post-op event (paced out of rhythm 9/24/2019). CHB with 100% V pacing (LV lead off). She does not feel well. Dry heaving with mexiletine. Taking phenergan PRN. 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   She was seen in HTS clinic and reported 4 low flow alarms the last 4 nights.  She reports they occur in her sleep and last for only a few seconds.  By the time she wakes up; they quickly stop.  She does  report to possibly being little volume overloaded.  She hasn't noticed weight gain at home but thinks she may have little extra fluid.  She denies SOB, chest pain, palpitations, LEGER. In review of her records: she was 223lbs on 10/24/19 during previous hospitalization and is 235lbs now.  Her lasix had previously been decreased to prn.     * Left ventricular assist device (LVAD) complication  - hx low flow alarms prior to admit, last LFA 12/1  - Possibly secondary to ADHF--> RV failure  - Formal report of CT unremarkable; however, it was a non contrast as creat was elevated above baseline on admit and inflow and outflow cannulas not accurately visualized    Nausea  - has intermittent nausea/vomiting, likely multifactorial with RVF and mexiletine  - on BID PPI as well as sucralfate, and H2 blocker   - judicious use of zofran and phenergan due to hx of VT storm    GLO (acute kidney injury)  - see CKD    Organ transplant candidate  - will list with exemption due to VT with ICD shocks and RV failure (will not tolerate ) and does not tolerate NO wean  - approved for transplant on 11/26. Tier 2.     Essential hypertension  -Patient is non-pulsatile  -Doppler goal 60-90 mmHg, currently controlled  -Antihypertensive medications include: none      Ventricular tachycardia  - Per EP office visit on day of admission: She continues to have multiple VT episodes with some ATP therapy, although no ICD shocks since starting mexiletine 10/24/2019. One slow VT episode 2.5hrs 11/3/2019. Patient asymptomatic with LVAD. On coumadin. No AFL since post-op event (paced out of rhythm 9/24/2019). CHB with 100% V pacing (LV lead off).   - Plan was to continue current regimen despite Mexiletine making her nauseous.  - Decreased Amiodarone to daily per EP plan on discharge last hospitalization   - Shocked x 3 11/25 for MMVT (in addition has had numerous successful ATP's for MMVT)   - S/P amio load   - now on Amio 400 BID and mexiletine  increased to 200  - ICD interrogation today due to palpitations per Dr Lua    LVAD (left ventricular assist device) present  - HeartMate 3 Implanted 9/10/19 as DT.  - Implanted by Dr. Walden  - INR supra theraputic.   - per Dr Lua will d/c coumadin, let drift down and bridge with heparin in preparation for OHTx  - Antiplatelets:  mg.  - LDH is stable Will monitor daily.   - Speed set at 5100  - Epi @ .02, NO @ 20.   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 Urgent selection 11/26, approved for transplant. Listed tier 2.    Procedure: Device Interrogation Including analysis of device parameters  Current Settings: Ventricular Assist Device  Review of device function is stable    TXP LVAD INTERROGATIONS 12/10/2019 12/10/2019 12/10/2019 12/10/2019 12/10/2019 12/10/2019 12/10/2019   Type HeartMate3 HeartMate3 HeartMate3 HeartMate3 HeartMate3 HeartMate3 HeartMate3   Flow 4.0 3.9 3.4 3.4 3.9 3.9 3.8   Speed 5100 5100 5100 5100 5100 5100 5100   PI 3.7 4.4 5.8 5.9 3.8 4.1 3.8   Power (Orellana) 3.5 3.6 3.4 3.5 3.6 3.5 3.4   LSL 4700 4700 4700 4700 - - -   Pulsatility Intermittent pulse Intermittent pulse Intermittent pulse Intermittent pulse Intermittent pulse Intermittent pulse -       CKD (chronic kidney disease)  - Baseline creat appears 1.3-1.6. 1.8 today.  - Will monitor with diuresis     Acute on chronic combined systolic and diastolic heart failure  - NICM  - Last 2D Echo 12/4/19. AV does not open, LVedd 5.5cm @ 5100.   - IV push lasix today 80 TID   - EPI .02, NO @ 20   - CVP 14 and SvO2 62 this AM  - GDMT: D/C ACE 10 BID,  & aldactone   - was on lasix 20 PRN at home PTA  -2g Na dietary restriction, 1500 mL fluid restriction, strict I/Os      ICD (implantable cardioverter-defibrillator) in place  - Med[employer]onic ICD  - Shocked x 3 11/25 for MMVT   - Loaded with IV amio on 11/25. Now on amio 400 PO BID and mexiletine increased to 200 QD   - See above VT        Julieth Ramos PA-C  Heart  Transplant  [facility]    Uninterrupted Critical Care/Counseling Time (not including procedures): 45 minutes

## 2019-12-10 NOTE — NURSING
CMICU DAILY GOALS       A: Awake    RASS: Goal - RASS Goal: 0-->alert and calm  Actual - RASS (Patel Agitation-Sedation Scale): 0-->alert and calm   Restraint necessity:    B: Breath   SBT: Not attempted   C: Coordinate A & B, analgesics/sedatives   Pain: managed    SAT: Not attempted  D: Delirium   CAM-ICU: Overall CAM-ICU: Negative  E: Early Mobility   MOVE Screen: Pass   Activity: Activity Management: activity adjusted per tolerance  FAS: Feeding/Nutrition   Diet order: Diet/Nutrition Received: 2 gram sodium,   Fluid restriction: Fluid Requirement: 2 L FR  T: Thrombus   DVT prophylaxis: VTE Required Core Measure: Pharmacological prophylaxis initiated/maintained  H: HOB Elevation   Head of Bed (HOB): HOB at 20-30 degrees  U: Ulcer Prophylaxis   GI: yes  G: Glucose control   managed    S: Skin   Bundle compliance: yes   Bathing/Skin Care: bath, chlorhexidine, bath, complete, dressed/undressed, linen changed Date: 12/10  B: Bowel Function   no issues   I: Indwelling Catheters   Fonseca necessity:     CVC necessity: Yes   IPAD offered: Not appropriate  D: De-escalation Antibx   Yes  Plan for the day   Maintain course of Tx   Family/Goals of care/Code Status   Code Status: Full Code     No acute events throughout day, VS and assessment per flow sheet, patient progressing towards goals as tolerated, plan of care reviewed with Deborah Navas and family, all concerns addressed, will continue to monitor.

## 2019-12-10 NOTE — NURSING
Notified PA of slightly elevated doppler. Per PA patient WNL and parameters for notification updated in Epic.

## 2019-12-10 NOTE — PROGRESS NOTES
12/10/2019  Trevin Sow    Current provider:  Vi Bryant MD      I, Trevin Sow, rounded on Deborah Navas to ensure all mechanical assist device settings (IABP or VAD) were appropriate and all parameters were within limits.  I was able to ensure all back up equipment was present, the staff had no issues, and the Perfusion Department daily rounding was complete.    2:05 PM

## 2019-12-10 NOTE — PT/OT/SLP PROGRESS
Physical Therapy Treatment    Patient Name:  Deborah Navas   MRN:  [id number]    Recommendations:     Discharge Recommendations:  home   Discharge Equipment Recommendations: none   Barriers to discharge: None    Assessment:     Deborah Navas is a 49 y.o. female admitted with a medical diagnosis of Left ventricular assist device (LVAD) complication.  She presents with the following impairments/functional limitations:  impaired endurance, impaired cardiopulmonary response to activity. Pt independent with mobility. Pt sat EOB x 10 minutes with a stable flow and no dizziness. Pt did reported nausea but thought that was more related to her meal than mobility. Pt able to ambulate 12ft within room with no LOB, no SOB, and no incidents.     Rehab Prognosis: Good; patient would benefit from acute skilled PT services to address these deficits and reach maximum level of function.    Recent Surgery: Procedure(s) (LRB):  INSERTION, CATHETER, RIGHT HEART (N/A) 5 Days Post-Op    Plan:     During this hospitalization, patient to be seen 2 x/week to address the identified rehab impairments via therapeutic activities, gait training, therapeutic exercises, neuromuscular re-education and progress toward the following goals:    · Plan of Care Expires:  01/05/20    Subjective     Chief Complaint: none reported   Patient/Family Comments/goals: to get better and return home   Pain/Comfort:  · Pain Rating 1: 0/10  · Pain Rating Post-Intervention 1: 0/10      Objective:     Communicated with RN prior to session.  Patient found HOB elevated with telemetry, pulse ox (continuous), blood pressure cuff, LVAD, central line, peripheral IV upon PT entry to room.     General Precautions: Standard, LVAD, fall   Orthopedic Precautions:N/A   Braces: N/A     Functional Mobility:  · Bed Mobility:     · Scooting: independence  · Supine to Sit: independence  · Transfers:     · Sit to Stand:  independence with no AD from EOB   · Gait: 12ft  with supervision with no AD  · No LOB  · No SOB  · Pt had a wave a nausea once returned to sitting EOB. Nausea passed after a few minutes       AM-PAC 6 CLICK MOBILITY  Turning over in bed (including adjusting bedclothes, sheets and blankets)?: 4  Sitting down on and standing up from a chair with arms (e.g., wheelchair, bedside commode, etc.): 4  Moving from lying on back to sitting on the side of the bed?: 4  Moving to and from a bed to a chair (including a wheelchair)?: 4  Need to walk in hospital room?: 4  Climbing 3-5 steps with a railing?: 4  Basic Mobility Total Score: 24       Therapeutic Activities and Exercises:  Educated pt on PT role/POC  Educated pt on importance of sitting EOB and OOB activity  Pt verbalized understanding     Patient left sitting EOB with all lines intact, call button in reach and RN notified..    GOALS:   Multidisciplinary Problems     Physical Therapy Goals        Problem: Physical Therapy Goal    Goal Priority Disciplines Outcome Goal Variances Interventions   Physical Therapy Goal     PT, PT/OT Ongoing, Progressing     Description:  Goals to be met by: 2019     Patient will increase functional independence with mobility by performin. Gait x 25ft with Supervision - not met                        Time Tracking:     PT Received On: 12/10/19  PT Start Time: 1022     PT Stop Time: 1045  PT Total Time (min): 23 min     Billable Minutes: Gait Training 8 and Therapeutic Activity 15    Treatment Type: Treatment  PT/PTA: PT     PTA Visit Number: 0     Niurka Posadas PT, DPT  12/10/2019  [phone]

## 2019-12-11 PROBLEM — Z76.82 KIDNEY TRANSPLANT CANDIDATE: Status: ACTIVE | Noted: 2019-12-11

## 2019-12-11 LAB
ALBUMIN SERPL BCP-MCNC: 3.4 G/DL (ref 3.5–5.2)
ALLENS TEST: ABNORMAL
ALP SERPL-CCNC: 87 U/L (ref 55–135)
ALT SERPL W/O P-5'-P-CCNC: 16 U/L (ref 10–44)
ANION GAP SERPL CALC-SCNC: 12 MMOL/L (ref 8–16)
APTT BLDCRRT: 39.9 SEC (ref 21–32)
AST SERPL-CCNC: 20 U/L (ref 10–40)
BASOPHILS # BLD AUTO: 0.08 K/UL (ref 0–0.2)
BASOPHILS NFR BLD: 1.3 % (ref 0–1.9)
BILIRUB DIRECT SERPL-MCNC: 0.2 MG/DL (ref 0.1–0.3)
BILIRUB SERPL-MCNC: 0.4 MG/DL (ref 0.1–1)
BILIRUB UR QL STRIP: NEGATIVE
BNP SERPL-MCNC: 369 PG/ML (ref 0–99)
BUN SERPL-MCNC: 23 MG/DL (ref 6–20)
CALCIUM SERPL-MCNC: 9.1 MG/DL (ref 8.7–10.5)
CHLORIDE SERPL-SCNC: 100 MMOL/L (ref 95–110)
CLARITY UR REFRACT.AUTO: CLEAR
CO2 SERPL-SCNC: 28 MMOL/L (ref 23–29)
COLOR UR AUTO: NORMAL
CREAT SERPL-MCNC: 1.8 MG/DL (ref 0.5–1.4)
CREAT UR-MCNC: 21 MG/DL (ref 15–325)
CRP SERPL-MCNC: 14.2 MG/L (ref 0–8.2)
DELSYS: ABNORMAL
DIFFERENTIAL METHOD: ABNORMAL
EOSINOPHIL # BLD AUTO: 0.2 K/UL (ref 0–0.5)
EOSINOPHIL NFR BLD: 2.9 % (ref 0–8)
ERYTHROCYTE [DISTWIDTH] IN BLOOD BY AUTOMATED COUNT: 17.1 % (ref 11.5–14.5)
ERYTHROCYTE [SEDIMENTATION RATE] IN BLOOD BY WESTERGREN METHOD: 20 MM/H
EST. GFR  (AFRICAN AMERICAN): 37.3 ML/MIN/1.73 M^2
EST. GFR  (NON AFRICAN AMERICAN): 32.4 ML/MIN/1.73 M^2
FLOW: 4
GLUCOSE SERPL-MCNC: 126 MG/DL (ref 70–110)
GLUCOSE UR QL STRIP: NEGATIVE
HCO3 UR-SCNC: 33.2 MMOL/L (ref 24–28)
HCT VFR BLD AUTO: 34.1 % (ref 37–48.5)
HGB BLD-MCNC: 9.9 G/DL (ref 12–16)
HGB UR QL STRIP: NEGATIVE
IMM GRANULOCYTES # BLD AUTO: 0.02 K/UL (ref 0–0.04)
IMM GRANULOCYTES NFR BLD AUTO: 0.3 % (ref 0–0.5)
INR PPP: 3.2 (ref 0.8–1.2)
KETONES UR QL STRIP: NEGATIVE
LDH SERPL L TO P-CCNC: 248 U/L (ref 110–260)
LEUKOCYTE ESTERASE UR QL STRIP: NEGATIVE
LYMPHOCYTES # BLD AUTO: 1.2 K/UL (ref 1–4.8)
LYMPHOCYTES NFR BLD: 19.5 % (ref 18–48)
MAGNESIUM SERPL-MCNC: 2.4 MG/DL (ref 1.6–2.6)
MCH RBC QN AUTO: 24.7 PG (ref 27–31)
MCHC RBC AUTO-ENTMCNC: 29 G/DL (ref 32–36)
MCV RBC AUTO: 85 FL (ref 82–98)
METHEMOGLOBIN: 0.3 % (ref 0–3)
MODE: ABNORMAL
MONOCYTES # BLD AUTO: 0.4 K/UL (ref 0.3–1)
MONOCYTES NFR BLD: 6.1 % (ref 4–15)
NEUTROPHILS # BLD AUTO: 4.3 K/UL (ref 1.8–7.7)
NEUTROPHILS NFR BLD: 69.9 % (ref 38–73)
NITRITE UR QL STRIP: NEGATIVE
NRBC BLD-RTO: 0 /100 WBC
PCO2 BLDA: 56.4 MMHG (ref 35–45)
PH SMN: 7.38 [PH] (ref 7.35–7.45)
PH UR STRIP: 7 [PH] (ref 5–8)
PHOSPHATE SERPL-MCNC: 4.3 MG/DL (ref 2.7–4.5)
PLATELET # BLD AUTO: 287 K/UL (ref 150–350)
PMV BLD AUTO: 10.1 FL (ref 9.2–12.9)
PO2 BLDA: 30 MMHG (ref 40–60)
POC BE: 8 MMOL/L
POC SATURATED O2: 54 % (ref 95–100)
POC TCO2: 35 MMOL/L (ref 24–29)
POTASSIUM SERPL-SCNC: 4.1 MMOL/L (ref 3.5–5.1)
PREALB SERPL-MCNC: 29 MG/DL (ref 20–43)
PROT SERPL-MCNC: 6.9 G/DL (ref 6–8.4)
PROT UR QL STRIP: NEGATIVE
PROT UR-MCNC: <7 MG/DL (ref 0–15)
PROT/CREAT UR: NORMAL MG/G{CREAT} (ref 0–0.2)
PROTHROMBIN TIME: 31.1 SEC (ref 9–12.5)
RBC # BLD AUTO: 4.01 M/UL (ref 4–5.4)
SAMPLE: ABNORMAL
SITE: ABNORMAL
SODIUM SERPL-SCNC: 140 MMOL/L (ref 136–145)
SP GR UR STRIP: 1 (ref 1–1.03)
SP02: 98
URN SPEC COLLECT METH UR: NORMAL
WBC # BLD AUTO: 6.11 K/UL (ref 3.9–12.7)

## 2019-12-11 PROCEDURE — 94761 N-INVAS EAR/PLS OXIMETRY MLT: CPT | Mod: NTX

## 2019-12-11 PROCEDURE — 84100 ASSAY OF PHOSPHORUS: CPT | Mod: NTX

## 2019-12-11 PROCEDURE — 25000003 PHARM REV CODE 250: Mod: NTX | Performed by: INTERNAL MEDICINE

## 2019-12-11 PROCEDURE — 86140 C-REACTIVE PROTEIN: CPT | Mod: NTX

## 2019-12-11 PROCEDURE — 20000000 HC ICU ROOM: Mod: NTX

## 2019-12-11 PROCEDURE — 99291 CRITICAL CARE FIRST HOUR: CPT | Mod: NTX,,, | Performed by: PHYSICIAN ASSISTANT

## 2019-12-11 PROCEDURE — 27000221 HC OXYGEN, UP TO 24 HOURS: Mod: NTX

## 2019-12-11 PROCEDURE — 63600175 PHARM REV CODE 636 W HCPCS: Mod: NTX | Performed by: STUDENT IN AN ORGANIZED HEALTH CARE EDUCATION/TRAINING PROGRAM

## 2019-12-11 PROCEDURE — 85610 PROTHROMBIN TIME: CPT | Mod: NTX

## 2019-12-11 PROCEDURE — 25000003 PHARM REV CODE 250: Mod: NTX | Performed by: PHYSICIAN ASSISTANT

## 2019-12-11 PROCEDURE — 83735 ASSAY OF MAGNESIUM: CPT | Mod: NTX

## 2019-12-11 PROCEDURE — 80076 HEPATIC FUNCTION PANEL: CPT | Mod: NTX

## 2019-12-11 PROCEDURE — 82803 BLOOD GASES ANY COMBINATION: CPT | Mod: NTX

## 2019-12-11 PROCEDURE — 27000248 HC VAD-ADDITIONAL DAY: Mod: NTX

## 2019-12-11 PROCEDURE — 81003 URINALYSIS AUTO W/O SCOPE: CPT | Mod: NTX

## 2019-12-11 PROCEDURE — 83880 ASSAY OF NATRIURETIC PEPTIDE: CPT | Mod: NTX

## 2019-12-11 PROCEDURE — 80048 BASIC METABOLIC PNL TOTAL CA: CPT | Mod: NTX

## 2019-12-11 PROCEDURE — 99291 PR CRITICAL CARE, E/M 30-74 MINUTES: ICD-10-PCS | Mod: NTX,,, | Performed by: PHYSICIAN ASSISTANT

## 2019-12-11 PROCEDURE — 84156 ASSAY OF PROTEIN URINE: CPT | Mod: NTX

## 2019-12-11 PROCEDURE — 99900035 HC TECH TIME PER 15 MIN (STAT): Mod: NTX

## 2019-12-11 PROCEDURE — 85730 THROMBOPLASTIN TIME PARTIAL: CPT | Mod: NTX

## 2019-12-11 PROCEDURE — 93750 INTERROGATION VAD IN PERSON: CPT | Mod: NTX,,, | Performed by: INTERNAL MEDICINE

## 2019-12-11 PROCEDURE — 84134 ASSAY OF PREALBUMIN: CPT | Mod: NTX

## 2019-12-11 PROCEDURE — 85025 COMPLETE CBC W/AUTO DIFF WBC: CPT | Mod: NTX

## 2019-12-11 PROCEDURE — 63600367 HC NITRIC OXIDE PER HOUR: Mod: NTX

## 2019-12-11 PROCEDURE — 83615 LACTATE (LD) (LDH) ENZYME: CPT | Mod: NTX

## 2019-12-11 PROCEDURE — 83050 HGB METHEMOGLOBIN QUAN: CPT | Mod: NTX

## 2019-12-11 PROCEDURE — 93750 PR INTERROGATE VENT ASSIST DEV, IN PERSON, W PHYSICIAN ANALYSIS: ICD-10-PCS | Mod: NTX,,, | Performed by: INTERNAL MEDICINE

## 2019-12-11 PROCEDURE — 82610 CYSTATIN C: CPT | Mod: NTX

## 2019-12-11 RX ADMIN — SENNOSIDES AND DOCUSATE SODIUM 1 TABLET: 8.6; 5 TABLET ORAL at 08:12

## 2019-12-11 RX ADMIN — FUROSEMIDE 80 MG: 10 INJECTION, SOLUTION INTRAMUSCULAR; INTRAVENOUS at 03:12

## 2019-12-11 RX ADMIN — POLYETHYLENE GLYCOL 3350 17 G: 17 POWDER, FOR SOLUTION ORAL at 08:12

## 2019-12-11 RX ADMIN — MEXILETINE HYDROCHLORIDE 200 MG: 200 CAPSULE ORAL at 09:12

## 2019-12-11 RX ADMIN — AMIODARONE HYDROCHLORIDE 400 MG: 200 TABLET ORAL at 08:12

## 2019-12-11 RX ADMIN — GABAPENTIN 200 MG: 100 CAPSULE ORAL at 08:12

## 2019-12-11 RX ADMIN — SUCRALFATE 1 G: 1 SUSPENSION ORAL at 12:12

## 2019-12-11 RX ADMIN — MEXILETINE HYDROCHLORIDE 200 MG: 200 CAPSULE ORAL at 03:12

## 2019-12-11 RX ADMIN — PANTOPRAZOLE SODIUM 40 MG: 40 TABLET, DELAYED RELEASE ORAL at 06:12

## 2019-12-11 RX ADMIN — PANTOPRAZOLE SODIUM 40 MG: 40 TABLET, DELAYED RELEASE ORAL at 03:12

## 2019-12-11 RX ADMIN — POTASSIUM CHLORIDE 40 MEQ: 1500 TABLET, EXTENDED RELEASE ORAL at 08:12

## 2019-12-11 RX ADMIN — FUROSEMIDE 80 MG: 10 INJECTION, SOLUTION INTRAMUSCULAR; INTRAVENOUS at 09:12

## 2019-12-11 RX ADMIN — SENNOSIDES AND DOCUSATE SODIUM 1 TABLET: 8.6; 5 TABLET ORAL at 09:12

## 2019-12-11 RX ADMIN — ALPRAZOLAM 0.25 MG: 0.25 TABLET ORAL at 12:12

## 2019-12-11 RX ADMIN — ASPIRIN 325 MG: 325 TABLET, DELAYED RELEASE ORAL at 08:12

## 2019-12-11 RX ADMIN — POTASSIUM CHLORIDE 40 MEQ: 1500 TABLET, EXTENDED RELEASE ORAL at 09:12

## 2019-12-11 RX ADMIN — MEXILETINE HYDROCHLORIDE 200 MG: 200 CAPSULE ORAL at 06:12

## 2019-12-11 RX ADMIN — MAGNESIUM OXIDE TAB 400 MG (241.3 MG ELEMENTAL MG) 400 MG: 400 (241.3 MG) TAB at 08:12

## 2019-12-11 RX ADMIN — AMIODARONE HYDROCHLORIDE 400 MG: 200 TABLET ORAL at 09:12

## 2019-12-11 RX ADMIN — FAMOTIDINE 20 MG: 20 TABLET ORAL at 09:12

## 2019-12-11 RX ADMIN — FUROSEMIDE 80 MG: 10 INJECTION, SOLUTION INTRAMUSCULAR; INTRAVENOUS at 08:12

## 2019-12-11 RX ADMIN — SUCRALFATE 1 G: 1 SUSPENSION ORAL at 06:12

## 2019-12-11 RX ADMIN — ZOLPIDEM TARTRATE 5 MG: 5 TABLET ORAL at 09:12

## 2019-12-11 RX ADMIN — SUCRALFATE 1 G: 1 SUSPENSION ORAL at 03:12

## 2019-12-11 RX ADMIN — GABAPENTIN 200 MG: 100 CAPSULE ORAL at 09:12

## 2019-12-11 RX ADMIN — MIRTAZAPINE 15 MG: 15 TABLET, FILM COATED ORAL at 09:12

## 2019-12-11 RX ADMIN — VENLAFAXINE 150 MG: 37.5 TABLET ORAL at 08:12

## 2019-12-11 NOTE — ASSESSMENT & PLAN NOTE
- HeartMate 3 Implanted 9/10/19 as DT.  - Implanted by Dr. Walden  - INR supra theraputic.   - per Dr Lua will d/c coumadin, let drift down and bridge with heparin in preparation for OHTx  - Antiplatelets:  mg.  - LDH is stable Will monitor daily.   - Speed set at 5100  - Epi @ .02, NO @ 20.   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 Urgent selection 11/26, approved for transplant. Listed tier 2.    Procedure: Device Interrogation Including analysis of device parameters  Current Settings: Ventricular Assist Device  Review of device function is stable    TXP LVAD INTERROGATIONS 12/11/2019 12/11/2019 12/11/2019 12/11/2019 12/11/2019 12/11/2019 12/11/2019   Type HeartMate3 HeartMate3 HeartMate3 HeartMate3 HeartMate3 HeartMate3 HeartMate3   Flow 3.9 3.7 3.9 4.3 3.6 3.7 3.9   Speed 5100 5100 5100 5100 5100 5100 5100   PI 4.5 5.1 4.7 2.5 5.4 5.0 4.3   Power (Orellana) 3.5 3.5 3.5 3.5 3.5 3.5 3.5   LSL 4700 4700 4700 4700 4700 4700 4700   Pulsatility Intermittent pulse Intermittent pulse Intermittent pulse Intermittent pulse Intermittent pulse Intermittent pulse Intermittent pulse

## 2019-12-11 NOTE — ASSESSMENT & PLAN NOTE
- Per EP office visit on day of admission: She continues to have multiple VT episodes with some ATP therapy, although no ICD shocks since starting mexiletine 10/24/2019. One slow VT episode 2.5hrs 11/3/2019. Patient asymptomatic with LVAD. On coumadin. No AFL since post-op event (paced out of rhythm 9/24/2019). CHB with 100% V pacing (LV lead off).   - Plan was to continue current regimen despite Mexiletine making her nauseous.  - Decreased Amiodarone to daily per EP plan on discharge last hospitalization   - Shocked x 3 11/25 for MMVT (in addition has had numerous successful ATP's for MMVT)   - S/P amio load   - now on Amio 400 BID and mexiletine increased to 200  - ICD interrogation 12/10 due to palpitations per Dr Lua- no events noted

## 2019-12-11 NOTE — PROGRESS NOTES
12/11/2019  Trevin Sow    Current provider:  Vi Bryant MD      I, Trevin Sow, rounded on Deborah Navas to ensure all mechanical assist device settings (IABP or VAD) were appropriate and all parameters were within limits.  I was able to ensure all back up equipment was present, the staff had no issues, and the Perfusion Department daily rounding was complete.    8:31 AM

## 2019-12-11 NOTE — ASSESSMENT & PLAN NOTE
- Baseline creat appears 1.3-1.6. 1.8 today.  - Will monitor with diuresis   - will consult KTM today for GFR evaluation due to Cr remaining higher than baseline and fluctuating.

## 2019-12-11 NOTE — SUBJECTIVE & OBJECTIVE
Interval History: Doing well this AM. No complaints overnight. No events on ICD interrogation.    Continuous Infusions:   epinephrine 0.02 mcg/kg/min (12/11/19 1000)    nitric oxide gas       Scheduled Meds:   amiodarone  400 mg Oral BID    aspirin  325 mg Oral Daily    famotidine  20 mg Oral QHS    furosemide  80 mg Intravenous TID    gabapentin  200 mg Oral BID    magnesium oxide  400 mg Oral Daily    mexiletine  200 mg Oral Q8H    mirtazapine  15 mg Oral QHS    pantoprazole  40 mg Oral BID AC    polyethylene glycol  17 g Oral Daily    potassium chloride  40 mEq Oral BID    senna-docusate 8.6-50 mg  1 tablet Oral BID    sucralfate  1 g Oral QID (AC & HS)    venlafaxine  150 mg Oral Daily     PRN Meds:ALPRAZolam, ondansetron, pneumoc 13-amber conj-dip cr(PF), promethazine, sodium chloride, white petrolatum, zolpidem    Review of patient's allergies indicates:   Allergen Reactions    Adhesive Blisters     Reaction to area in chest and up only    Codeine Itching     Objective:     Vital Signs (Most Recent):  Temp: 97.6 °F (36.4 °C) (12/11/19 0700)  Pulse: 80 (12/11/19 1000)  Resp: 16 (12/11/19 1000)  BP: (!) 88/0 (12/11/19 0700)  SpO2: 98 % (12/11/19 0829) Vital Signs (24h Range):  Temp:  [97.6 °F (36.4 °C)-98.4 °F (36.9 °C)] 97.6 °F (36.4 °C)  Pulse:  [] 80  Resp:  [16-43] 16  SpO2:  [96 %-99 %] 98 %  BP: (70-90)/(0) 88/0     Patient Vitals for the past 72 hrs (Last 3 readings):   Weight   12/11/19 0300 104.4 kg (230 lb 2.6 oz)   12/09/19 1300 105.3 kg (232 lb 2.3 oz)   12/09/19 0300 105.3 kg (232 lb 2.3 oz)     Body mass index is 36.32 kg/m².      Intake/Output Summary (Last 24 hours) at 12/11/2019 1023  Last data filed at 12/11/2019 1000  Gross per 24 hour   Intake 1904 ml   Output 3250 ml   Net -1346 ml       Hemodynamic Parameters:       Physical Exam   Constitutional: She is oriented to person, place, and time. She appears well-developed and well-nourished.   HENT:   Mouth/Throat:  Oropharynx is clear and moist.   Eyes: EOM are normal.   Neck: JVD present.   Cardiovascular: Normal rate, regular rhythm and intact distal pulses.   Smooth VAD hum   Pulmonary/Chest: Effort normal and breath sounds normal. No respiratory distress. She has no rales.   Abdominal: Soft. Bowel sounds are normal. She exhibits no distension. There is no tenderness. There is no guarding.   Musculoskeletal: She exhibits no edema.   Neurological: She is alert and oriented to person, place, and time.   Skin: Skin is warm.   Psychiatric: She has a normal mood and affect.   Nursing note and vitals reviewed.      Significant Labs:  CBC:  Recent Labs   Lab 12/09/19  0333 12/10/19  0448 12/11/19  0500   WBC 6.53 6.92 6.11   RBC 4.04 4.02 4.01   HGB 10.0* 9.9* 9.9*   HCT 34.8* 34.7* 34.1*    290 287   MCV 86 86 85   MCH 24.8* 24.6* 24.7*   MCHC 28.7* 28.5* 29.0*     BNP:  Recent Labs   Lab 12/06/19 0316 12/09/19 0333 12/11/19  0500   * 346* 369*     CMP:  Recent Labs   Lab 12/06/19  0316  12/09/19  0333 12/10/19  0448 12/11/19  0500   *   < > 141* 144* 126*   CALCIUM 9.2   < > 9.1 9.1 9.1   ALBUMIN 3.3*  --  3.4*  --  3.4*   PROT 6.6  --  6.8  --  6.9      < > 139 139 140   K 4.0   < > 4.0 4.1 4.1   CO2 28   < > 28 30* 28      < > 99 100 100   BUN 28*   < > 22* 21* 23*   CREATININE 1.9*   < > 1.6* 1.8* 1.8*   ALKPHOS 91  --  89  --  87   ALT 14  --  16  --  16   AST 19  --  21  --  20   BILITOT 0.3  --  0.3  --  0.4    < > = values in this interval not displayed.      Coagulation:   Recent Labs   Lab 12/09/19  0333 12/10/19  0448 12/11/19  0500   INR 3.1* 3.7* 3.2*   APTT 38.3* 40.5* 39.9*     LDH:  Recent Labs   Lab 12/09/19  0828 12/10/19  0448 12/11/19  0500    223 248     Microbiology:  Microbiology Results (last 7 days)     ** No results found for the last 168 hours. **          I have reviewed all pertinent labs within the past 24 hours.    Estimated Creatinine Clearance: 46.3 mL/min  (A) (based on SCr of 1.8 mg/dL (H)).    Diagnostic Results:  I have reviewed and interpreted all pertinent imaging results/findings within the past 24 hours.

## 2019-12-11 NOTE — SUBJECTIVE & OBJECTIVE
Subjective:     History of Present Illness:   50 yo WF with NICM, s/p HM3 implantation 9/10/19 (post up coarse uncomplicated with the exception of+ diaphragmatic stimulation of LV lead and pleural effusion with chest tube placement, atrial flutter with NADINE/DCCV), V-fib reported in setting of hypokalemia with appropriate shock from ICD on Amiodarone prior to LVAD placement; and recent hospitalizations for ventricular arrythmias; started on Mexilitine.  She was seen in EP clinic today and she continues to have multiple VT episodes with some ATP therapy, although no ICD shocks since starting mexiletine 10/24/2019. One slow VT episode 2.5hrs 11/3/2019. Patient asymptomatic with LVAD. On coumadin. No AFL since post-op event (paced out of rhythm 9/24/2019). CHB with 100% V pacing (LV lead off). She does not feel well. Dry heaving with mexiletine. Taking phenergan PRN. 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     She was seen in HTS clinic and reported 4 low flow alarms the last 4 nights.  She reports they occur in her sleep and last for only a few seconds.  By the time she wakes up; they quickly stop.  She does report to possibly being little volume overloaded.  She hasn't noticed weight gain at home but thinks she may have little extra fluid.  She denies SOB, chest pain, palpitations, LEGER. In review of her records: she was 223lbs on 10/24/19 during previous hospitalization and is 235lbs now.  Her lasix had previously been decreased to prn. KTM consulted for evaluation of possible Kidney/Heart co transplant.     Ms. Navas is a 49 y.o. year old female with end-stage heart failure secondary to non-ischemic cardiomyopathy.  She is currently listed for a heart transplant.    Interval History:  Patient seen and examined, breathing on 4 liters of oxygen, 2.8 liters of urine voided over the last 24 hours. ICD interrogated, no events. She is currently listed as urgent selection tier 2  for heart transplant. On epinephrine infusion.     Past Medical and Surgical History: Ms. Navas has a past medical history of Fractures, History of ventricular fibrillation (9/4/2019), History of ventricular tachycardia (9/4/2019), Hyperlipidemia, Migraine, and Osteoarthritis.  She has a past surgical history that includes Tympanostomy tube placement (1971- 1979); Tonsillectomy; eardrum reconstruction (1980); Temporomandibular joint surgery (Right, 1988); Sinus surgery (Right, 1994); Forearm fracture surgery (Bilateral, 7360-5249); Elbow surgery (Left, 6326-7764); Lumbar fusion (2007); Back surgery; Bone graft (Left); Insertion of pacemaker (Left); Insertion of implantable cardioverter-defibrillator (ICD) generator with two existing leads; Right heart catheterization (Right, 9/4/2019); Left ventricular assist device (N/A, 9/10/2019); Insertion of graft to pericardium (9/10/2019); Sternal wound closure (9/10/2019); Treatment of cardiac arrhythmia (N/A, 9/24/2019); and Right heart catheterization (N/A, 11/18/2019).    Past Social and Family History: Ms. Navas reports that she has never smoked. She has never used smokeless tobacco. She reports that she does not drink alcohol or use drugs.Her family history includes Broken bones in her maternal grandmother; Cancer in her paternal grandmother; Dislocations in her maternal grandmother; Heart failure in her maternal grandfather and paternal grandfather; Migraines in her maternal grandfather, maternal grandmother, mother, paternal grandfather, and paternal grandmother; Obesity in her paternal grandmother; Osteoarthritis in her father, maternal grandfather, maternal grandmother, mother, paternal grandfather, and paternal grandmother; Osteoporosis in her maternal grandmother; Scoliosis in her maternal grandmother; Stroke in her father.    Intake/Output - Last 3 Shifts       12/09 0700 - 12/10 0659 12/10 0700 - 12/11 0659 12/11 0700 - 12/12 0659    P.O. 650 3866 344     "I.V. (mL/kg) 180 (1.7) 154 (1.5) 66 (0.6)    Total Intake(mL/kg) 830 (7.9) 1794 (17.2) 666 (6.4)    Urine (mL/kg/hr) 2200 (0.9) 2850 (1.1) 1050 (0.9)    Emesis/NG output 0      Stool 0  0    Total Output 2200 2850 1050    Net -1370 -1056 -384           Urine Occurrence 1 x      Stool Occurrence 1 x  1 x    Emesis Occurrence 1 x             Review of Systems   Constitutional: Negative for activity change and appetite change.   HENT: Negative.    Respiratory: Negative.  Negative for shortness of breath.    Cardiovascular: Negative.    Gastrointestinal: Negative.    Genitourinary: Negative.    Musculoskeletal: Negative.      Objective:     Vital Signs (Most Recent):  Temp: 98.3 °F (36.8 °C) (12/11/19 1500)  Pulse: 89 (12/11/19 1700)  Resp: 16 (12/11/19 1700)  BP: (!) 88/0 (12/11/19 1500)  SpO2: 98 % (12/11/19 1600) Vital Signs (24h Range):  Temp:  [97.6 °F (36.4 °C)-98.3 °F (36.8 °C)] 98.3 °F (36.8 °C)  Pulse:  [] 89  Resp:  [16-31] 16  SpO2:  [96 %-98 %] 98 %  BP: (70-92)/(0) 88/0     Weight: 104.4 kg (230 lb 2.6 oz)  Height: 5' 6.75" (169.5 cm)  Body mass index is 36.32 kg/m².    Physical Exam   Constitutional: She is oriented to person, place, and time. She appears well-developed and well-nourished.   HENT:   Mouth/Throat: Oropharynx is clear and moist.   Eyes: EOM are normal.   Neck: JVD present.   Cardiovascular: Normal rate, regular rhythm and intact distal pulses.   Smooth VAD hum   Pulmonary/Chest: Effort normal and breath sounds normal. No respiratory distress. She has no rales.   Abdominal: Soft. Bowel sounds are normal. She exhibits no distension. There is no tenderness. There is no guarding.   Musculoskeletal: She exhibits no edema.   Neurological: She is alert and oriented to person, place, and time.   Skin: Skin is warm.   Psychiatric: She has a normal mood and affect.   Nursing note and vitals reviewed.      Significant Labs:  CBC:   Recent Labs   Lab 12/09/19  0333 12/10/19  0448 12/11/19  0500 "   WBC 6.53 6.92 6.11   RBC 4.04 4.02 4.01   HGB 10.0* 9.9* 9.9*   HCT 34.8* 34.7* 34.1*    290 287   MCV 86 86 85   MCH 24.8* 24.6* 24.7*   MCHC 28.7* 28.5* 29.0*     CMP:   Recent Labs   Lab 12/06/19  0316  12/09/19  0333 12/10/19  0448 12/11/19  0500   *   < > 141* 144* 126*   CALCIUM 9.2   < > 9.1 9.1 9.1   ALBUMIN 3.3*  --  3.4*  --  3.4*   PROT 6.6  --  6.8  --  6.9      < > 139 139 140   K 4.0   < > 4.0 4.1 4.1   CO2 28   < > 28 30* 28      < > 99 100 100   BUN 28*   < > 22* 21* 23*   CREATININE 1.9*   < > 1.6* 1.8* 1.8*   ALKPHOS 91  --  89  --  87   ALT 14  --  16  --  16   AST 19  --  21  --  20    < > = values in this interval not displayed.       Diagnostics:  None

## 2019-12-11 NOTE — PROGRESS NOTES
[facility]  Heart Transplant  Progress Note    Patient Name: Deborah Navas  MRN: [id number]  Admission Date: 11/13/2019  Hospital Length of Stay: 26 days  Attending Physician: Vi Bryant MD  Primary Care Provider: Primary Doctor No  Principal Problem:Left ventricular assist device (LVAD) complication    Subjective:     Interval History: Doing well this AM. No complaints overnight. No events on ICD interrogation.    Continuous Infusions:   epinephrine 0.02 mcg/kg/min (12/11/19 1000)    nitric oxide gas       Scheduled Meds:   amiodarone  400 mg Oral BID    aspirin  325 mg Oral Daily    famotidine  20 mg Oral QHS    furosemide  80 mg Intravenous TID    gabapentin  200 mg Oral BID    magnesium oxide  400 mg Oral Daily    mexiletine  200 mg Oral Q8H    mirtazapine  15 mg Oral QHS    pantoprazole  40 mg Oral BID AC    polyethylene glycol  17 g Oral Daily    potassium chloride  40 mEq Oral BID    senna-docusate 8.6-50 mg  1 tablet Oral BID    sucralfate  1 g Oral QID (AC & HS)    venlafaxine  150 mg Oral Daily     PRN Meds:ALPRAZolam, ondansetron, pneumoc 13-amber conj-dip cr(PF), promethazine, sodium chloride, white petrolatum, zolpidem    Review of patient's allergies indicates:   Allergen Reactions    Adhesive Blisters     Reaction to area in chest and up only    Codeine Itching     Objective:     Vital Signs (Most Recent):  Temp: 97.6 °F (36.4 °C) (12/11/19 0700)  Pulse: 80 (12/11/19 1000)  Resp: 16 (12/11/19 1000)  BP: (!) 88/0 (12/11/19 0700)  SpO2: 98 % (12/11/19 0829) Vital Signs (24h Range):  Temp:  [97.6 °F (36.4 °C)-98.4 °F (36.9 °C)] 97.6 °F (36.4 °C)  Pulse:  [] 80  Resp:  [16-43] 16  SpO2:  [96 %-99 %] 98 %  BP: (70-90)/(0) 88/0     Patient Vitals for the past 72 hrs (Last 3 readings):   Weight   12/11/19 0300 104.4 kg (230 lb 2.6 oz)   12/09/19 1300 105.3 kg (232 lb 2.3 oz)   12/09/19 0300 105.3 kg (232 lb 2.3 oz)     Body mass index is 36.32  kg/m².      Intake/Output Summary (Last 24 hours) at 12/11/2019 1023  Last data filed at 12/11/2019 1000  Gross per 24 hour   Intake 1904 ml   Output 3250 ml   Net -1346 ml       Hemodynamic Parameters:       Physical Exam   Constitutional: She is oriented to person, place, and time. She appears well-developed and well-nourished.   HENT:   Mouth/Throat: Oropharynx is clear and moist.   Eyes: EOM are normal.   Neck: JVD present.   Cardiovascular: Normal rate, regular rhythm and intact distal pulses.   Smooth VAD hum   Pulmonary/Chest: Effort normal and breath sounds normal. No respiratory distress. She has no rales.   Abdominal: Soft. Bowel sounds are normal. She exhibits no distension. There is no tenderness. There is no guarding.   Musculoskeletal: She exhibits no edema.   Neurological: She is alert and oriented to person, place, and time.   Skin: Skin is warm.   Psychiatric: She has a normal mood and affect.   Nursing note and vitals reviewed.      Significant Labs:  CBC:  Recent Labs   Lab 12/09/19  0333 12/10/19  0448 12/11/19  0500   WBC 6.53 6.92 6.11   RBC 4.04 4.02 4.01   HGB 10.0* 9.9* 9.9*   HCT 34.8* 34.7* 34.1*    290 287   MCV 86 86 85   MCH 24.8* 24.6* 24.7*   MCHC 28.7* 28.5* 29.0*     BNP:  Recent Labs   Lab 12/06/19  0316 12/09/19  0333 12/11/19  0500   * 346* 369*     CMP:  Recent Labs   Lab 12/06/19  0316  12/09/19  0333 12/10/19  0448 12/11/19  0500   *   < > 141* 144* 126*   CALCIUM 9.2   < > 9.1 9.1 9.1   ALBUMIN 3.3*  --  3.4*  --  3.4*   PROT 6.6  --  6.8  --  6.9      < > 139 139 140   K 4.0   < > 4.0 4.1 4.1   CO2 28   < > 28 30* 28      < > 99 100 100   BUN 28*   < > 22* 21* 23*   CREATININE 1.9*   < > 1.6* 1.8* 1.8*   ALKPHOS 91  --  89  --  87   ALT 14  --  16  --  16   AST 19  --  21  --  20   BILITOT 0.3  --  0.3  --  0.4    < > = values in this interval not displayed.      Coagulation:   Recent Labs   Lab 12/09/19  0333 12/10/19  0448 12/11/19  5322    INR 3.1* 3.7* 3.2*   APTT 38.3* 40.5* 39.9*     LDH:  Recent Labs   Lab 12/09/19  0828 12/10/19  0448 12/11/19  0500    223 248     Microbiology:  Microbiology Results (last 7 days)     ** No results found for the last 168 hours. **          I have reviewed all pertinent labs within the past 24 hours.    Estimated Creatinine Clearance: 46.3 mL/min (A) (based on SCr of 1.8 mg/dL (H)).    Diagnostic Results:  I have reviewed and interpreted all pertinent imaging results/findings within the past 24 hours.    Assessment and Plan:     50 yo WF with NICM, s/p HM3 implantation 9/10/19 (post up coarse uncomplicated with the exception of+ diaphragmatic stimulation of LV lead and pleural effusion with chest tube placement, atrial flutter with NADINE/DCCV), V-fib reported in setting of hypokalemia with appropriate shock from ICD on Amiodarone prior to LVAD placement; and recent hospitalizations for ventricular arrythmias; started on Mexilitine.  She was seen in EP clinic today and she continues to have multiple VT episodes with some ATP therapy, although no ICD shocks since starting mexiletine 10/24/2019. One slow VT episode 2.5hrs 11/3/2019. Patient asymptomatic with LVAD. On coumadin. No AFL since post-op event (paced out of rhythm 9/24/2019). CHB with 100% V pacing (LV lead off). She does not feel well. Dry heaving with mexiletine. Taking phenergan PRN. 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   She was seen in HTS clinic and reported 4 low flow alarms the last 4 nights.  She reports they occur in her sleep and last for only a few seconds.  By the time she wakes up; they quickly stop.  She does report to possibly being little volume overloaded.  She hasn't noticed weight gain at home but thinks she may have little extra fluid.  She denies SOB, chest pain, palpitations, LEGER. In review of her records: she was 223lbs on 10/24/19 during previous hospitalization and is 235lbs now.  Her  lasix had previously been decreased to prn.     * Left ventricular assist device (LVAD) complication  - hx low flow alarms prior to admit, last LFA 12/1  - Possibly secondary to ADHF--> RV failure  - Formal report of CT unremarkable; however, it was a non contrast as creat was elevated above baseline on admit and inflow and outflow cannulas not accurately visualized    Nausea  - has intermittent nausea/vomiting, likely multifactorial with RVF and mexiletine  - on BID PPI as well as sucralfate, and H2 blocker   - judicious use of zofran and phenergan due to hx of VT storm    GLO (acute kidney injury)  - see CKD    Organ transplant candidate  - will list with exemption due to VT with ICD shocks and RV failure (will not tolerate ) and does not tolerate NO wean  - approved for transplant on 11/26. Tier 2.     Essential hypertension  - Patient is non-pulsatile  - Doppler goal 60-90 mmHg, currently controlled  - Antihypertensive medications include: none      Ventricular tachycardia  - Per EP office visit on day of admission: She continues to have multiple VT episodes with some ATP therapy, although no ICD shocks since starting mexiletine 10/24/2019. One slow VT episode 2.5hrs 11/3/2019. Patient asymptomatic with LVAD. On coumadin. No AFL since post-op event (paced out of rhythm 9/24/2019). CHB with 100% V pacing (LV lead off).   - Plan was to continue current regimen despite Mexiletine making her nauseous.  - Decreased Amiodarone to daily per EP plan on discharge last hospitalization   - Shocked x 3 11/25 for MMVT (in addition has had numerous successful ATP's for MMVT)   - S/P amio load   - now on Amio 400 BID and mexiletine increased to 200  - ICD interrogation 12/10 due to palpitations per Dr Lua- no events noted    LVAD (left ventricular assist device) present  - HeartMate 3 Implanted 9/10/19 as DT.  - Implanted by Dr. Walden  - INR supra theraputic.   - per Dr Lua will d/c coumadin, let drift down and bridge  with heparin in preparation for OHTx  - Antiplatelets:  mg.  - LDH is stable Will monitor daily.   - Speed set at 5100  - Epi @ .02, NO @ 20.   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 Urgent selection 11/26, approved for transplant. Listed tier 2.    Procedure: Device Interrogation Including analysis of device parameters  Current Settings: Ventricular Assist Device  Review of device function is stable    TXP LVAD INTERROGATIONS 12/11/2019 12/11/2019 12/11/2019 12/11/2019 12/11/2019 12/11/2019 12/11/2019   Type HeartMate3 HeartMate3 HeartMate3 HeartMate3 HeartMate3 HeartMate3 HeartMate3   Flow 3.9 3.7 3.9 4.3 3.6 3.7 3.9   Speed 5100 5100 5100 5100 5100 5100 5100   PI 4.5 5.1 4.7 2.5 5.4 5.0 4.3   Power ([place]) 3.5 3.5 3.5 3.5 3.5 3.5 3.5   LSL 4700 4700 4700 4700 4700 4700 4700   Pulsatility Intermittent pulse Intermittent pulse Intermittent pulse Intermittent pulse Intermittent pulse Intermittent pulse Intermittent pulse       CKD (chronic kidney disease)  - Baseline creat appears 1.3-1.6. 1.8 today.  - Will monitor with diuresis   - will consult KTM today for GFR evaluation due to Cr remaining higher than baseline and fluctuating.     Acute on chronic combined systolic and diastolic heart failure  - NICM  - Last 2D Echo 12/4/19. AV does not open, LVedd 5.5cm @ 5100.   - IV push lasix today 80 TID   - EPI .02, NO @ 20   - CVP 11 and SvO2 54 this AM. CO/CI 5.3/2.4, SVR 1041  - GDMT: D/C ACE 10 BID,  & aldactone   - was on lasix 20 PRN at home PTA  -2g Na dietary restriction, 1500 mL fluid restriction, strict I/Os      ICD (implantable cardioverter-defibrillator) in place  - [employer]onic ICD  - Shocked x 3 11/25 for MMVT   - Loaded with IV amio on 11/25. Now on amio 400 PO BID and mexiletine increased to 200 QD   - See above VT        BALDEMAR EppsC  Heart Transplant  [facility]    Uninterrupted Critical Care/Counseling Time (not including procedures): 45 minutes

## 2019-12-11 NOTE — HPI
48 yo WF with NICM, s/p HM3 implantation 9/10/19 (post up coarse uncomplicated with the exception of+ diaphragmatic stimulation of LV lead and pleural effusion with chest tube placement, atrial flutter with NADINE/DCCV), V-fib reported in setting of hypokalemia with appropriate shock from ICD on Amiodarone prior to LVAD placement; and recent hospitalizations for ventricular arrythmias; started on Mexilitine.  She was seen in EP clinic today and she continues to have multiple VT episodes with some ATP therapy, although no ICD shocks since starting mexiletine 10/24/2019. One slow VT episode 2.5hrs 11/3/2019. Patient asymptomatic with LVAD. On coumadin. No AFL since post-op event (paced out of rhythm 9/24/2019). CHB with 100% V pacing (LV lead off). She does not feel well. Dry heaving with mexiletine. Taking phenergan PRN. 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     She was seen in HTS clinic and reported 4 low flow alarms the last 4 nights.  She reports they occur in her sleep and last for only a few seconds.  By the time she wakes up; they quickly stop.  She does report to possibly being little volume overloaded.  She hasn't noticed weight gain at home but thinks she may have little extra fluid.  She denies SOB, chest pain, palpitations, LEGER. In review of her records: she was 223lbs on 10/24/19 during previous hospitalization and is 235lbs now.  Her lasix had previously been decreased to prn. KTM consulted for evaluation of possible Kidney/Heart co transplant.

## 2019-12-11 NOTE — ASSESSMENT & PLAN NOTE
- Patient is non-pulsatile  - Doppler goal 60-90 mmHg, currently controlled  - Antihypertensive medications include: none

## 2019-12-11 NOTE — ASSESSMENT & PLAN NOTE
- NICM  - Last 2D Echo 12/4/19. AV does not open, LVedd 5.5cm @ 5100.   - IV push lasix today 80 TID   - EPI .02, NO @ 20   - CVP 11 and SvO2 54 this AM. CO/CI 5.3/2.4, SVR 1041  - GDMT: D/C ACE 10 BID,  & aldactone   - was on lasix 20 PRN at home PTA  -2g Na dietary restriction, 1500 mL fluid restriction, strict I/Os

## 2019-12-11 NOTE — PLAN OF CARE
VS and assessment per flow sheet, see below for updates:    Pulmonary: Pt on 4L NC with Nitric @ 20ppm. Pulse oximetry q4h - O2 sat >95%, lung sounds clear. No distress/SOB noted. AM SVO2 54.    Cardiovascular: Pt is paced @ 80. Doppler 70, 88, 86. Pt IM pulsatile. Hm3 in place, VAD hum noted on auscultation. Speed at 5100, flow 3.5-4.2, PI 3.5-5.3, Pwr 3.4-3.6. CVP 13, 12, 11.     Neurological: Pt AAOx4, afebrile, BROWN spontaneously. No overt deficits noted. Pt able to ambulate to bedside commode without RN assistance. Pupils 3mm, equal reactive round and brisk.     Gastrointestinal: Pt on cardiac diet, 2L FR. Pt has intermittent nausea, somewhat relieved by cool cloth application. Avoiding zofran/phenergan as it can prolong QT and pt has hx of VT on this stay. Pt had 1 BM this shift.     Genitourinary: Pt voids spontaneously per bedside commode. UO adequate.     Endocrine: N/a.    Skin/Bath: Pt bathes independently. Skin intact. LVAD dressing changed daily on day shift.  Date of last CHG bath given: 12/10.     Infusions: epi @ 0.02.     CMICU DAILY GOALS       A: Awake    RASS: Goal - RASS Goal: 0-->alert and calm  Actual - RASS (Patel Agitation-Sedation Scale): 0-->alert and calm   Restraint necessity:    B: Breath   SBT: Not intubated   C: Coordinate A & B, analgesics/sedatives   Pain: managed    SAT: Not intubated  D: Delirium   CAM-ICU: Overall CAM-ICU: Negative  E: Early Mobility   MOVE Screen: Pass   Activity: Activity Management: activity clustered for rest period, activity adjusted per tolerance  FAS: Feeding/Nutrition   Diet order: Diet/Nutrition Received: 2 gram sodium, low saturated fat/low cholesterol,   Fluid restriction: Fluid Requirement: 2L FR  T: Thrombus   DVT prophylaxis: VTE Required Core Measure: Pharmacological prophylaxis initiated/maintained  H: HOB Elevation   Head of Bed (HOB): HOB at 30-45 degrees  U: Ulcer Prophylaxis   GI: yes  G: Glucose control   managed    S: Skin   Bundle  compliance: yes   Bathing/Skin Care: bath, chlorhexidine, bath, complete Date: 12/10  B: Bowel Function   no issues   I: Indwelling Catheters   Fonseca necessity:   no   CVC necessity: Yes   IPAD offered: No  D: De-escalation Antibx   Yes  Plan for the day   Monitor CVP/Dopplers/UO.   Family/Goals of care/Code Status   Code Status: Full Code    Patient progressing towards goals as tolerated, plan of care communicated and reviewed with Deborah Navas and family, all concerns addressed, will continue to monitor.     Rita Ohara RN

## 2019-12-12 LAB
ALLENS TEST: ABNORMAL
ANION GAP SERPL CALC-SCNC: 12 MMOL/L (ref 8–16)
APTT BLDCRRT: 39.6 SEC (ref 21–32)
BASOPHILS # BLD AUTO: 0.06 K/UL (ref 0–0.2)
BASOPHILS NFR BLD: 0.8 % (ref 0–1.9)
BUN SERPL-MCNC: 24 MG/DL (ref 6–20)
CALCIUM SERPL-MCNC: 9.3 MG/DL (ref 8.7–10.5)
CHLORIDE SERPL-SCNC: 97 MMOL/L (ref 95–110)
CO2 SERPL-SCNC: 28 MMOL/L (ref 23–29)
CREAT SERPL-MCNC: 2.1 MG/DL (ref 0.5–1.4)
DELSYS: ABNORMAL
DIFFERENTIAL METHOD: ABNORMAL
EOSINOPHIL # BLD AUTO: 0.2 K/UL (ref 0–0.5)
EOSINOPHIL NFR BLD: 2.8 % (ref 0–8)
ERYTHROCYTE [DISTWIDTH] IN BLOOD BY AUTOMATED COUNT: 17 % (ref 11.5–14.5)
ERYTHROCYTE [SEDIMENTATION RATE] IN BLOOD BY WESTERGREN METHOD: 18 MM/H
EST. GFR  (AFRICAN AMERICAN): 31 ML/MIN/1.73 M^2
EST. GFR  (NON AFRICAN AMERICAN): 26.9 ML/MIN/1.73 M^2
FIO2: 36
FLOW: 4
GLUCOSE SERPL-MCNC: 131 MG/DL (ref 70–110)
HCO3 UR-SCNC: 30.8 MMOL/L (ref 24–28)
HCT VFR BLD AUTO: 34.7 % (ref 37–48.5)
HGB BLD-MCNC: 9.9 G/DL (ref 12–16)
IMM GRANULOCYTES # BLD AUTO: 0.03 K/UL (ref 0–0.04)
IMM GRANULOCYTES NFR BLD AUTO: 0.4 % (ref 0–0.5)
INR PPP: 3.2 (ref 0.8–1.2)
LDH SERPL L TO P-CCNC: 273 U/L (ref 110–260)
LYMPHOCYTES # BLD AUTO: 1.2 K/UL (ref 1–4.8)
LYMPHOCYTES NFR BLD: 15.7 % (ref 18–48)
MAGNESIUM SERPL-MCNC: 2.2 MG/DL (ref 1.6–2.6)
MCH RBC QN AUTO: 24.3 PG (ref 27–31)
MCHC RBC AUTO-ENTMCNC: 28.5 G/DL (ref 32–36)
MCV RBC AUTO: 85 FL (ref 82–98)
METHEMOGLOBIN: 0.5 % (ref 0–3)
MODE: ABNORMAL
MONOCYTES # BLD AUTO: 0.6 K/UL (ref 0.3–1)
MONOCYTES NFR BLD: 7.5 % (ref 4–15)
NEUTROPHILS # BLD AUTO: 5.4 K/UL (ref 1.8–7.7)
NEUTROPHILS NFR BLD: 72.8 % (ref 38–73)
NRBC BLD-RTO: 0 /100 WBC
PCO2 BLDA: 48.1 MMHG (ref 35–45)
PH SMN: 7.42 [PH] (ref 7.35–7.45)
PHOSPHATE SERPL-MCNC: 3.7 MG/DL (ref 2.7–4.5)
PLATELET # BLD AUTO: 317 K/UL (ref 150–350)
PMV BLD AUTO: 10.4 FL (ref 9.2–12.9)
PO2 BLDA: 27 MMHG (ref 40–60)
POC BE: 6 MMOL/L
POC SATURATED O2: 50 % (ref 95–100)
POC TCO2: 32 MMOL/L (ref 24–29)
POTASSIUM SERPL-SCNC: 4.5 MMOL/L (ref 3.5–5.1)
PROTHROMBIN TIME: 30.8 SEC (ref 9–12.5)
RBC # BLD AUTO: 4.07 M/UL (ref 4–5.4)
SAMPLE: ABNORMAL
SITE: ABNORMAL
SODIUM SERPL-SCNC: 137 MMOL/L (ref 136–145)
SP02: 99
WBC # BLD AUTO: 7.37 K/UL (ref 3.9–12.7)

## 2019-12-12 PROCEDURE — 85025 COMPLETE CBC W/AUTO DIFF WBC: CPT | Mod: NTX

## 2019-12-12 PROCEDURE — 93750 PR INTERROGATE VENT ASSIST DEV, IN PERSON, W PHYSICIAN ANALYSIS: ICD-10-PCS | Mod: NTX,,, | Performed by: INTERNAL MEDICINE

## 2019-12-12 PROCEDURE — 84100 ASSAY OF PHOSPHORUS: CPT | Mod: NTX

## 2019-12-12 PROCEDURE — 83050 HGB METHEMOGLOBIN QUAN: CPT | Mod: NTX

## 2019-12-12 PROCEDURE — 99233 SBSQ HOSP IP/OBS HIGH 50: CPT | Mod: NTX,,, | Performed by: INTERNAL MEDICINE

## 2019-12-12 PROCEDURE — 94761 N-INVAS EAR/PLS OXIMETRY MLT: CPT | Mod: NTX

## 2019-12-12 PROCEDURE — 80048 BASIC METABOLIC PNL TOTAL CA: CPT | Mod: NTX

## 2019-12-12 PROCEDURE — 99291 CRITICAL CARE FIRST HOUR: CPT | Mod: 25,NTX,, | Performed by: PHYSICIAN ASSISTANT

## 2019-12-12 PROCEDURE — 20000000 HC ICU ROOM: Mod: NTX

## 2019-12-12 PROCEDURE — 99291 PR CRITICAL CARE, E/M 30-74 MINUTES: ICD-10-PCS | Mod: 25,NTX,, | Performed by: PHYSICIAN ASSISTANT

## 2019-12-12 PROCEDURE — 25000003 PHARM REV CODE 250: Mod: NTX | Performed by: PHYSICIAN ASSISTANT

## 2019-12-12 PROCEDURE — 25000003 PHARM REV CODE 250: Mod: NTX | Performed by: NURSE PRACTITIONER

## 2019-12-12 PROCEDURE — 85730 THROMBOPLASTIN TIME PARTIAL: CPT | Mod: NTX

## 2019-12-12 PROCEDURE — 27000685 HC LVAD KIT (30 DAY SUPPLY): Mod: NTX

## 2019-12-12 PROCEDURE — 27000248 HC VAD-ADDITIONAL DAY: Mod: NTX

## 2019-12-12 PROCEDURE — 63600175 PHARM REV CODE 636 W HCPCS: Mod: NTX | Performed by: PHYSICIAN ASSISTANT

## 2019-12-12 PROCEDURE — 25000003 PHARM REV CODE 250: Mod: NTX | Performed by: INTERNAL MEDICINE

## 2019-12-12 PROCEDURE — 97116 GAIT TRAINING THERAPY: CPT | Mod: NTX

## 2019-12-12 PROCEDURE — 82803 BLOOD GASES ANY COMBINATION: CPT | Mod: NTX

## 2019-12-12 PROCEDURE — 99900035 HC TECH TIME PER 15 MIN (STAT): Mod: NTX

## 2019-12-12 PROCEDURE — 99233 PR SUBSEQUENT HOSPITAL CARE,LEVL III: ICD-10-PCS | Mod: NTX,,, | Performed by: INTERNAL MEDICINE

## 2019-12-12 PROCEDURE — 27000221 HC OXYGEN, UP TO 24 HOURS: Mod: NTX

## 2019-12-12 PROCEDURE — 85610 PROTHROMBIN TIME: CPT | Mod: NTX

## 2019-12-12 PROCEDURE — 83735 ASSAY OF MAGNESIUM: CPT | Mod: NTX

## 2019-12-12 PROCEDURE — 93750 INTERROGATION VAD IN PERSON: CPT | Mod: NTX,,, | Performed by: INTERNAL MEDICINE

## 2019-12-12 PROCEDURE — 63600367 HC NITRIC OXIDE PER HOUR: Mod: NTX

## 2019-12-12 PROCEDURE — 63600175 PHARM REV CODE 636 W HCPCS: Mod: NTX | Performed by: STUDENT IN AN ORGANIZED HEALTH CARE EDUCATION/TRAINING PROGRAM

## 2019-12-12 PROCEDURE — 83615 LACTATE (LD) (LDH) ENZYME: CPT | Mod: NTX

## 2019-12-12 RX ORDER — HYDRALAZINE HYDROCHLORIDE 25 MG/1
25 TABLET, FILM COATED ORAL EVERY 8 HOURS
Status: DISCONTINUED | OUTPATIENT
Start: 2019-12-12 | End: 2019-12-12

## 2019-12-12 RX ORDER — HYDRALAZINE HYDROCHLORIDE 50 MG/1
50 TABLET, FILM COATED ORAL EVERY 8 HOURS
Status: DISCONTINUED | OUTPATIENT
Start: 2019-12-12 | End: 2019-12-16

## 2019-12-12 RX ADMIN — ONDANSETRON 8 MG: 2 INJECTION INTRAMUSCULAR; INTRAVENOUS at 07:12

## 2019-12-12 RX ADMIN — MEXILETINE HYDROCHLORIDE 200 MG: 200 CAPSULE ORAL at 09:12

## 2019-12-12 RX ADMIN — POLYETHYLENE GLYCOL 3350 17 G: 17 POWDER, FOR SOLUTION ORAL at 08:12

## 2019-12-12 RX ADMIN — HYDRALAZINE HYDROCHLORIDE 25 MG: 25 TABLET, FILM COATED ORAL at 11:12

## 2019-12-12 RX ADMIN — ASPIRIN 325 MG: 325 TABLET, DELAYED RELEASE ORAL at 08:12

## 2019-12-12 RX ADMIN — FUROSEMIDE 80 MG: 10 INJECTION, SOLUTION INTRAMUSCULAR; INTRAVENOUS at 08:12

## 2019-12-12 RX ADMIN — ZOLPIDEM TARTRATE 5 MG: 5 TABLET ORAL at 10:12

## 2019-12-12 RX ADMIN — MEXILETINE HYDROCHLORIDE 200 MG: 200 CAPSULE ORAL at 03:12

## 2019-12-12 RX ADMIN — HYDRALAZINE HYDROCHLORIDE 50 MG: 50 TABLET ORAL at 04:12

## 2019-12-12 RX ADMIN — GABAPENTIN 200 MG: 100 CAPSULE ORAL at 08:12

## 2019-12-12 RX ADMIN — SUCRALFATE 1 G: 1 SUSPENSION ORAL at 11:12

## 2019-12-12 RX ADMIN — POTASSIUM CHLORIDE 40 MEQ: 1500 TABLET, EXTENDED RELEASE ORAL at 08:12

## 2019-12-12 RX ADMIN — AMIODARONE HYDROCHLORIDE 400 MG: 200 TABLET ORAL at 08:12

## 2019-12-12 RX ADMIN — PANTOPRAZOLE SODIUM 40 MG: 40 TABLET, DELAYED RELEASE ORAL at 03:12

## 2019-12-12 RX ADMIN — SUCRALFATE 1 G: 1 SUSPENSION ORAL at 06:12

## 2019-12-12 RX ADMIN — MIRTAZAPINE 15 MG: 15 TABLET, FILM COATED ORAL at 08:12

## 2019-12-12 RX ADMIN — SUCRALFATE 1 G: 1 SUSPENSION ORAL at 05:12

## 2019-12-12 RX ADMIN — SENNOSIDES AND DOCUSATE SODIUM 1 TABLET: 8.6; 5 TABLET ORAL at 08:12

## 2019-12-12 RX ADMIN — MEXILETINE HYDROCHLORIDE 200 MG: 200 CAPSULE ORAL at 06:12

## 2019-12-12 RX ADMIN — HYDRALAZINE HYDROCHLORIDE 50 MG: 50 TABLET ORAL at 09:12

## 2019-12-12 RX ADMIN — FUROSEMIDE 80 MG: 10 INJECTION, SOLUTION INTRAMUSCULAR; INTRAVENOUS at 03:12

## 2019-12-12 RX ADMIN — VENLAFAXINE 150 MG: 37.5 TABLET ORAL at 08:12

## 2019-12-12 RX ADMIN — FAMOTIDINE 20 MG: 20 TABLET ORAL at 08:12

## 2019-12-12 RX ADMIN — EPINEPHRINE 0.02 MCG/KG/MIN: 1 INJECTION PARENTERAL at 08:12

## 2019-12-12 RX ADMIN — MAGNESIUM OXIDE TAB 400 MG (241.3 MG ELEMENTAL MG) 400 MG: 400 (241.3 MG) TAB at 08:12

## 2019-12-12 RX ADMIN — PANTOPRAZOLE SODIUM 40 MG: 40 TABLET, DELAYED RELEASE ORAL at 06:12

## 2019-12-12 NOTE — PROGRESS NOTES
SW went by pt's room in order to provide continuity of care, however pt was nauseous and requesting SW visit tomorrow morning. SW to follow up. SW remains available.

## 2019-12-12 NOTE — PROGRESS NOTES
12/12/2019  Trevin Sow    Current provider:  Jolene Lua MD      I, Trevin Sow, rounded on Deborah Navas to ensure all mechanical assist device settings (IABP or VAD) were appropriate and all parameters were within limits.  I was able to ensure all back up equipment was present, the staff had no issues, and the Perfusion Department daily rounding was complete.    7:25 AM

## 2019-12-12 NOTE — CONSULTS
Please see consult note by fellow Dr. Goldstein dated 12/12/19    Staff Transplant Nephrology Addendum:    Patient was discussed on rounds with Dr. Jp Goldstein as outlined in his note. I have personally evaluated Ms. Navas and agree with the findings listed in Dr. Goldstein's attached note with additional comments/corrections as below:    50 yo WF with history of NICM s/p AICD and HM3 implantation with no prior history of DM or HTN. KTM service is asked to evaluate her for dual heart-kidney transplantation candidacy. Reviewed her labs she seems to have Cr values ranging from 1.1 to 2.2 with eGFR ranging from 25.4 to 59.1 since 8/27/19 until today. The last time her Cr was 1.1 was on 10/10/19. CT A/P with normal size kidneys on 11/13/19. UA negative for proteinuria. Follow-up on cystatin C GFR. Currently there is no evidence of irreversible kidney disease. Will bring up for group discussion once Cystatin C results but would not recommend dual heart-kidney transplantation in this patient currently.     Gayla Lua MD  Renal Transplant Attending

## 2019-12-12 NOTE — PROGRESS NOTES
[facility]  Heart Transplant  Progress Note    Patient Name: Deborah Navas  MRN: [id number]  Admission Date: 11/13/2019  Hospital Length of Stay: 27 days  Attending Physician: Jolene Lua MD  Primary Care Provider: Primary Doctor No  Principal Problem:Kidney transplant candidate    Subjective:     Interval History: No complaints, no events overnight. Feels generally well. Denies NVD, SOB, Chest pain.    Continuous Infusions:   epinephrine 0.02 mcg/kg/min (12/12/19 0900)    nitric oxide gas       Scheduled Meds:   amiodarone  400 mg Oral BID    aspirin  325 mg Oral Daily    famotidine  20 mg Oral QHS    furosemide  80 mg Intravenous TID    gabapentin  200 mg Oral BID    hydrALAZINE  25 mg Oral Q8H    magnesium oxide  400 mg Oral Daily    mexiletine  200 mg Oral Q8H    mirtazapine  15 mg Oral QHS    pantoprazole  40 mg Oral BID AC    polyethylene glycol  17 g Oral Daily    potassium chloride  40 mEq Oral BID    senna-docusate 8.6-50 mg  1 tablet Oral BID    sucralfate  1 g Oral QID (AC & HS)    venlafaxine  150 mg Oral Daily     PRN Meds:ALPRAZolam, ondansetron, pneumoc 13-amber conj-dip cr(PF), promethazine, sodium chloride, white petrolatum, zolpidem    Review of patient's allergies indicates:   Allergen Reactions    Adhesive Blisters     Reaction to area in chest and up only    Codeine Itching     Objective:     Vital Signs (Most Recent):  Temp: 98 °F (36.7 °C) (12/12/19 0700)  Pulse: 81 (12/12/19 0900)  Resp: 16 (12/12/19 0900)  BP: (!) 96/0 (12/12/19 0700)  SpO2: 98 % (12/12/19 0700) Vital Signs (24h Range):  Temp:  [97.9 °F (36.6 °C)-98.3 °F (36.8 °C)] 98 °F (36.7 °C)  Pulse:  [] 81  Resp:  [16-41] 16  SpO2:  [98 %-99 %] 98 %  BP: ()/(0-71) 96/0     Patient Vitals for the past 72 hrs (Last 3 readings):   Weight   12/12/19 0300 105 kg (231 lb 7.7 oz)   12/11/19 0300 104.4 kg (230 lb 2.6 oz)   12/09/19 1300 105.3 kg (232 lb 2.3 oz)     Body mass index  is 37.36 kg/m².      Intake/Output Summary (Last 24 hours) at 12/12/2019 1015  Last data filed at 12/12/2019 0900  Gross per 24 hour   Intake 978 ml   Output 2450 ml   Net -1472 ml       Hemodynamic Parameters:  CVP:  [12 mmHg] 12 mmHg    Physical Exam   Constitutional: She is oriented to person, place, and time. She appears well-developed and well-nourished.   HENT:   Mouth/Throat: Oropharynx is clear and moist.   Eyes: EOM are normal.   Neck: JVD present.   Cardiovascular: Normal rate, regular rhythm and intact distal pulses.   Smooth VAD hum   Pulmonary/Chest: Effort normal and breath sounds normal. No respiratory distress. She has no rales.   Abdominal: Soft. Bowel sounds are normal. She exhibits no distension. There is no tenderness. There is no guarding.   Musculoskeletal: She exhibits no edema.   Neurological: She is alert and oriented to person, place, and time.   Skin: Skin is warm.   Psychiatric: She has a normal mood and affect.   Nursing note and vitals reviewed.      Significant Labs:  CBC:  Recent Labs   Lab 12/10/19  0448 12/11/19  0500 12/12/19  0212   WBC 6.92 6.11 7.37   RBC 4.02 4.01 4.07   HGB 9.9* 9.9* 9.9*   HCT 34.7* 34.1* 34.7*    287 317   MCV 86 85 85   MCH 24.6* 24.7* 24.3*   MCHC 28.5* 29.0* 28.5*     BNP:  Recent Labs   Lab 12/06/19  0316 12/09/19  0333 12/11/19  0500   * 346* 369*     CMP:  Recent Labs   Lab 12/06/19  0316  12/09/19  0333 12/10/19  0448 12/11/19  0500 12/12/19  0212   *   < > 141* 144* 126* 131*   CALCIUM 9.2   < > 9.1 9.1 9.1 9.3   ALBUMIN 3.3*  --  3.4*  --  3.4*  --    PROT 6.6  --  6.8  --  6.9  --       < > 139 139 140 137   K 4.0   < > 4.0 4.1 4.1 4.5   CO2 28   < > 28 30* 28 28      < > 99 100 100 97   BUN 28*   < > 22* 21* 23* 24*   CREATININE 1.9*   < > 1.6* 1.8* 1.8* 2.1*   ALKPHOS 91  --  89  --  87  --    ALT 14  --  16  --  16  --    AST 19  --  21  --  20  --    BILITOT 0.3  --  0.3  --  0.4  --     < > = values in this  interval not displayed.      Coagulation:   Recent Labs   Lab 12/10/19  0448 12/11/19  0500 12/12/19  0212   INR 3.7* 3.2* 3.2*   APTT 40.5* 39.9* 39.6*     LDH:  Recent Labs   Lab 12/10/19  0448 12/11/19  0500 12/12/19  0212    248 273*     Microbiology:  Microbiology Results (last 7 days)     ** No results found for the last 168 hours. **          I have reviewed all pertinent labs within the past 24 hours.    Estimated Creatinine Clearance: 39.3 mL/min (A) (based on SCr of 2.1 mg/dL (H)).    Diagnostic Results:  I have reviewed and interpreted all pertinent imaging results/findings within the past 24 hours.    Assessment and Plan:     48 yo WF with NICM, s/p HM3 implantation 9/10/19 (post up coarse uncomplicated with the exception of+ diaphragmatic stimulation of LV lead and pleural effusion with chest tube placement, atrial flutter with NADINE/DCCV), V-fib reported in setting of hypokalemia with appropriate shock from ICD on Amiodarone prior to LVAD placement; and recent hospitalizations for ventricular arrythmias; started on Mexilitine.  She was seen in EP clinic today and she continues to have multiple VT episodes with some ATP therapy, although no ICD shocks since starting mexiletine 10/24/2019. One slow VT episode 2.5hrs 11/3/2019. Patient asymptomatic with LVAD. On coumadin. No AFL since post-op event (paced out of rhythm 9/24/2019). CHB with 100% V pacing (LV lead off). She does not feel well. Dry heaving with mexiletine. Taking phenergan PRN. 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   She was seen in HTS clinic and reported 4 low flow alarms the last 4 nights.  She reports they occur in her sleep and last for only a few seconds.  By the time she wakes up; they quickly stop.  She does report to possibly being little volume overloaded.  She hasn't noticed weight gain at home but thinks she may have little extra fluid.  She denies SOB, chest pain, palpitations, LEGER.  In review of her records: she was 223lbs on 10/24/19 during previous hospitalization and is 235lbs now.  Her lasix had previously been decreased to prn.     Nausea  - has intermittent nausea/vomiting, likely multifactorial with RVF and mexiletine  - on BID PPI as well as sucralfate, and H2 blocker   - judicious use of zofran and phenergan due to hx of VT storm    GLO (acute kidney injury)  - see CKD    Organ transplant candidate  - will list with exemption due to VT with ICD shocks and RV failure (will not tolerate ) and does not tolerate NO wean  - approved for transplant on 11/26. Tier 2.     Left ventricular assist device (LVAD) complication  - hx low flow alarms prior to admit, last LFA 12/1  - Possibly secondary to ADHF--> RV failure  - Formal report of CT unremarkable; however, it was a non contrast as creat was elevated above baseline on admit and inflow and outflow cannulas not accurately visualized    Essential hypertension  - Patient is non-pulsatile  - Doppler goal 60-90 mmHg, currently borderline high  - Antihypertensive medications include: hydralazine 25 Q8H      Ventricular tachycardia  - Per EP office visit on day of admission: She continues to have multiple VT episodes with some ATP therapy, although no ICD shocks since starting mexiletine 10/24/2019. One slow VT episode 2.5hrs 11/3/2019. Patient asymptomatic with LVAD. On coumadin. No AFL since post-op event (paced out of rhythm 9/24/2019). CHB with 100% V pacing (LV lead off).   - Plan was to continue current regimen despite Mexiletine making her nauseous.  - Decreased Amiodarone to daily per EP plan on discharge last hospitalization   - Shocked x 3 11/25 for MMVT (in addition has had numerous successful ATP's for MMVT)   - S/P amio load   - now on Amio 400 BID and mexiletine increased to 200  - ICD interrogation 12/10 due to palpitations per Dr Lua- no events noted    LVAD (left ventricular assist device) present  - HeartMate 3 Implanted  9/10/19 as DT.  - Implanted by Dr. Walden  - INR supra theraputic.   - per Dr Lua will d/c coumadin, let drift down and bridge with heparin in preparation for OHTx  - Antiplatelets:  mg.  - LDH is stable Will monitor daily.   - Speed set at 5100  - Epi @ .02, NO @ 20.   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 Urgent selection 11/26, approved for transplant. Listed tier 2.    Procedure: Device Interrogation Including analysis of device parameters  Current Settings: Ventricular Assist Device  Review of device function is stable    TXP LVAD INTERROGATIONS 12/12/2019 12/12/2019 12/12/2019 12/12/2019 12/12/2019 12/12/2019 12/12/2019   Type HeartMate3 HeartMate3 HeartMate3 HeartMate3 HeartMate3 HeartMate3 HeartMate3   Flow 3.3 3.5 3.0 3.8 4.0 4.2 4.4   Speed 5100 5100 5100 5100 5100 5100 5100   PI 6.0 5.3 6.7 4.9 3.5 3.0 2.8   Power ([place]) 3.4 3.5 3.4 3.5 3.5 3.5 3.6   LSL 4700 4700 4700 4700 4700 4700 4700   Pulsatility Intermittent pulse Intermittent pulse Intermittent pulse Intermittent pulse Intermittent pulse Intermittent pulse Intermittent pulse       CKD (chronic kidney disease)  - Baseline creat appears 1.3-1.6. 2.1 today.  - Will monitor with diuresis   - KTM consult for GFR evaluation due to Cr remaining higher than baseline and fluctuating.     Acute on chronic combined systolic and diastolic heart failure  - NICM  - Last 2D Echo 12/4/19. AV does not open, LVedd 5.5cm @ 5100.   - IV push lasix today 80 TID   - EPI .02, NO @ 20   - CVP 10 and SvO2 50 this AM. CO/CI 4.8/2.1  - GDMT: hydralazine for BP control   - was on lasix 20 PRN at home PTA  -2g Na dietary restriction, 1500 mL fluid restriction, strict I/Os      ICD (implantable cardioverter-defibrillator) in place  - [employer]onic ICD  - Shocked x 3 11/25 for MMVT   - Loaded with IV amio on 11/25. Now on amio 400 PO BID and mexiletine increased to 200 QD   - See above VT        Julieth Ramos PA-C  Heart Transplant  [facility]    Uninterrupted Critical Care/Counseling Time (not including procedures): 45 minutes

## 2019-12-12 NOTE — ASSESSMENT & PLAN NOTE
50 yo WF with NICM, s/p HM3 implantation 9/10/19 (post up coarse uncomplicated with the exception of+ diaphragmatic stimulation of LV lead and pleural effusion with chest tube placement, atrial flutter with NADINE/DCCV), ICD placement, approved for Heart transplant. Kidney transplant consulted for evaluation of kidney transplant.     Plan/Recommendations:   - determine chronicity of CKD  - as per care everywhere, patient with GFR<60 since 2016  - CT Scan of the abdomen/Pelvis with IV contrast in 11/14/2019 reveals normal size kidneys  - cystatin C pending  - UA and UPCR within normal limits   - intake and output  - adequate urine output  - patient will need to be discussed in kidney transplant committee on 12/13

## 2019-12-12 NOTE — SUBJECTIVE & OBJECTIVE
Interval History: No complaints, no events overnight. Feels generally well. Denies NVD, SOB, Chest pain.    Continuous Infusions:   epinephrine 0.02 mcg/kg/min (12/12/19 0900)    nitric oxide gas       Scheduled Meds:   amiodarone  400 mg Oral BID    aspirin  325 mg Oral Daily    famotidine  20 mg Oral QHS    furosemide  80 mg Intravenous TID    gabapentin  200 mg Oral BID    hydrALAZINE  25 mg Oral Q8H    magnesium oxide  400 mg Oral Daily    mexiletine  200 mg Oral Q8H    mirtazapine  15 mg Oral QHS    pantoprazole  40 mg Oral BID AC    polyethylene glycol  17 g Oral Daily    potassium chloride  40 mEq Oral BID    senna-docusate 8.6-50 mg  1 tablet Oral BID    sucralfate  1 g Oral QID (AC & HS)    venlafaxine  150 mg Oral Daily     PRN Meds:ALPRAZolam, ondansetron, pneumoc 13-amber conj-dip cr(PF), promethazine, sodium chloride, white petrolatum, zolpidem    Review of patient's allergies indicates:   Allergen Reactions    Adhesive Blisters     Reaction to area in chest and up only    Codeine Itching     Objective:     Vital Signs (Most Recent):  Temp: 98 °F (36.7 °C) (12/12/19 0700)  Pulse: 81 (12/12/19 0900)  Resp: 16 (12/12/19 0900)  BP: (!) 96/0 (12/12/19 0700)  SpO2: 98 % (12/12/19 0700) Vital Signs (24h Range):  Temp:  [97.9 °F (36.6 °C)-98.3 °F (36.8 °C)] 98 °F (36.7 °C)  Pulse:  [] 81  Resp:  [16-41] 16  SpO2:  [98 %-99 %] 98 %  BP: ()/(0-71) 96/0     Patient Vitals for the past 72 hrs (Last 3 readings):   Weight   12/12/19 0300 105 kg (231 lb 7.7 oz)   12/11/19 0300 104.4 kg (230 lb 2.6 oz)   12/09/19 1300 105.3 kg (232 lb 2.3 oz)     Body mass index is 37.36 kg/m².      Intake/Output Summary (Last 24 hours) at 12/12/2019 1015  Last data filed at 12/12/2019 0900  Gross per 24 hour   Intake 978 ml   Output 2450 ml   Net -1472 ml       Hemodynamic Parameters:  CVP:  [12 mmHg] 12 mmHg    Physical Exam   Constitutional: She is oriented to person, place, and time. She appears  well-developed and well-nourished.   HENT:   Mouth/Throat: Oropharynx is clear and moist.   Eyes: EOM are normal.   Neck: JVD present.   Cardiovascular: Normal rate, regular rhythm and intact distal pulses.   Smooth VAD hum   Pulmonary/Chest: Effort normal and breath sounds normal. No respiratory distress. She has no rales.   Abdominal: Soft. Bowel sounds are normal. She exhibits no distension. There is no tenderness. There is no guarding.   Musculoskeletal: She exhibits no edema.   Neurological: She is alert and oriented to person, place, and time.   Skin: Skin is warm.   Psychiatric: She has a normal mood and affect.   Nursing note and vitals reviewed.      Significant Labs:  CBC:  Recent Labs   Lab 12/10/19  0448 12/11/19  0500 12/12/19  0212   WBC 6.92 6.11 7.37   RBC 4.02 4.01 4.07   HGB 9.9* 9.9* 9.9*   HCT 34.7* 34.1* 34.7*    287 317   MCV 86 85 85   MCH 24.6* 24.7* 24.3*   MCHC 28.5* 29.0* 28.5*     BNP:  Recent Labs   Lab 12/06/19  0316 12/09/19  0333 12/11/19  0500   * 346* 369*     CMP:  Recent Labs   Lab 12/06/19  0316  12/09/19  0333 12/10/19  0448 12/11/19  0500 12/12/19  0212   *   < > 141* 144* 126* 131*   CALCIUM 9.2   < > 9.1 9.1 9.1 9.3   ALBUMIN 3.3*  --  3.4*  --  3.4*  --    PROT 6.6  --  6.8  --  6.9  --       < > 139 139 140 137   K 4.0   < > 4.0 4.1 4.1 4.5   CO2 28   < > 28 30* 28 28      < > 99 100 100 97   BUN 28*   < > 22* 21* 23* 24*   CREATININE 1.9*   < > 1.6* 1.8* 1.8* 2.1*   ALKPHOS 91  --  89  --  87  --    ALT 14  --  16  --  16  --    AST 19  --  21  --  20  --    BILITOT 0.3  --  0.3  --  0.4  --     < > = values in this interval not displayed.      Coagulation:   Recent Labs   Lab 12/10/19  0448 12/11/19  0500 12/12/19 0212   INR 3.7* 3.2* 3.2*   APTT 40.5* 39.9* 39.6*     LDH:  Recent Labs   Lab 12/10/19  0448 12/11/19  0500 12/12/19 0212    248 273*     Microbiology:  Microbiology Results (last 7 days)     ** No results found for the  last 168 hours. **          I have reviewed all pertinent labs within the past 24 hours.    Estimated Creatinine Clearance: 39.3 mL/min (A) (based on SCr of 2.1 mg/dL (H)).    Diagnostic Results:  I have reviewed and interpreted all pertinent imaging results/findings within the past 24 hours.

## 2019-12-12 NOTE — ASSESSMENT & PLAN NOTE
- NICM  - Last 2D Echo 12/4/19. AV does not open, LVedd 5.5cm @ 5100.   - IV push lasix today 80 TID   - EPI .02, NO @ 20   - CVP 10 and SvO2 50 this AM. CO/CI 4.8/2.1  - GDMT: hydralazine for BP control   - was on lasix 20 PRN at home PTA  -2g Na dietary restriction, 1500 mL fluid restriction, strict I/Os

## 2019-12-12 NOTE — PLAN OF CARE
No acute events throughout day, VS and assessment per flow sheet, see below for updates:     Pulmonary: Patient remains on 4L NC with 20 PPM NO. Lungs clear. Sats checked q4.     Cardiovascular: Paced on monitor. Doppler 96/104/78/118. Hydralazine started, RHC requested. CVP 12/13/12 . HM3: flows 3.0-3.8, speed 5100, PI 3.9-6.7, power 3.4-3.5.     Neurological: AAOx4     Gastrointestinal: No BM, int nausea.     Genitourinary: Voids per BSC.     Endocrine: WNL     Skin/Bath: See flowsheets for charting                     Date of last CHG bath given: 12/11/19 @ 1500 per patient     Infusions: Epi @0.02     Patient progressing towards goals as tolerated, plan of care communicated and reviewed with Deborah Navas and family, all concerns addressed, will continue to monitor.      Roz Heredia RN        CMICU DAILY GOALS         A: Awake               RASS: Goal - RASS Goal: 0-->alert and calm  Actual - RASS (Patel Agitation-Sedation Scale): 0-->alert and calm              Restraint necessity:    B: Breath              SBT: Not intubated   C: Coordinate A & B, analgesics/sedatives              Pain: managed               SAT: Not intubated  D: Delirium              CAM-ICU: Overall CAM-ICU: Negative  E: Early Mobility              MOVE Screen: Pass              Activity: Activity Management: activity adjusted per tolerance  FAS: Feeding/Nutrition              Diet order: Diet/Nutrition Received: low saturated fat/low cholesterol, restrict fluids,   Fluid restriction: Fluid Requirement: 2L FR  T: Thrombus              DVT prophylaxis: VTE Required Core Measure: Pharmacological prophylaxis initiated/maintained  H: HOB Elevation              Head of Bed (HOB): HOB at 30-45 degrees  U: Ulcer Prophylaxis              GI: yes  G: Glucose control              managed    S: Skin              Bundle compliance: yes   Bathing/Skin Care: back care, bath, chlorhexidine, bath, complete, dressed/undressed, linen  changed Date: 12/11/19  B: Bowel Function              no issues   I: Indwelling Catheters              Fonseca necessity:                CVC necessity: Yes              IPAD offered: No  D: De-escalation Antibx              No  Plan for the day              BP control, echo tomorrow, RHC?  Family/Goals of care/Code Status              Code Status: Full Code                No acute events throughout day, VS and assessment per flow sheet, patient progressing towards goals as tolerated, plan of care reviewed with Deborah Navas and family, all concerns addressed, will continue to monitor.

## 2019-12-12 NOTE — ASSESSMENT & PLAN NOTE
- Patient is non-pulsatile  - Doppler goal 60-90 mmHg, currently borderline high  - Antihypertensive medications include: hydralazine 25 Q8H

## 2019-12-12 NOTE — ASSESSMENT & PLAN NOTE
- Baseline creat appears 1.3-1.6. 2.1,uptrending  - Strict I/O and chart   - renally dose all medications   - Avoid nephrotoxic medications, NSAIDs, IV contrast, ACE/ARB.  - Maintain MAP > 65  - Hb > 7 gm/dL   - Will follow closely

## 2019-12-12 NOTE — PLAN OF CARE
VS and assessment per flow sheet, see below for updates:     Pulmonary: Pt on 4L NC with Nitric @ 20ppm. Pulse oximetry q4h - O2 sat >95%, lung sounds clear. No distress/SOB noted. AM SVO2 50.       Cardiovascular: Pt is paced @ 80. Doppler 88, 86, 90. Pt IM pulsatile. Hm3 in place, VAD hum noted on auscultation. Speed at 5100, flow 3.3-4.2, PI 3.5-6.5, Pwr 3.4-3.6. CVP 9, 10, 9.      Neurological: Pt AAOx4, afebrile, BROWN spontaneously. No overt deficits noted. Pt able to ambulate to bedside commode without RN assistance. Pupils 3mm, equal reactive round and brisk.      Gastrointestinal: Pt on cardiac diet, 2L FR. Pt had one episode of nausea, somewhat relieved by cool cloth application and hot tea. Avoiding zofran/phenergan as it can prolong QT and pt has hx of VT on this stay. Pt had 1 BM this shift.      Genitourinary: Pt voids spontaneously per bedside commode. UO adequate.      Endocrine: N/a.     Skin/Bath: Pt bathes independently. Skin intact. LVAD dressing changed - Driveline site 2.            Date of last CHG bath given: 12/11.      Infusions: epi @ 0.02.     CMICU DAILY GOALS       A: Awake    RASS: Goal - RASS Goal: 0-->alert and calm  Actual - RASS (Patel Agitation-Sedation Scale): 0-->alert and calm   Restraint necessity:    B: Breath   SBT: Not intubated   C: Coordinate A & B, analgesics/sedatives   Pain: managed    SAT: Not intubated  D: Delirium   CAM-ICU: Overall CAM-ICU: Negative  E: Early Mobility   MOVE Screen: Pass   Activity: Activity Management: activity adjusted per tolerance, activity clustered for rest period  FAS: Feeding/Nutrition   Diet order: Diet/Nutrition Received: low saturated fat/low cholesterol, 2 gram sodium, restrict fluids,   Fluid restriction: Fluid Requirement: 2L FR  T: Thrombus   DVT prophylaxis: VTE Required Core Measure: Pharmacological prophylaxis initiated/maintained  H: HOB Elevation   Head of Bed (HOB): HOB at 30-45 degrees  U: Ulcer Prophylaxis   GI: yes  G:  Glucose control   managed    S: Skin   Bundle compliance: yes   Bathing/Skin Care: back care, bath, chlorhexidine, bath, complete, dressed/undressed, linen changed Date: 12/11  B: Bowel Function   no issues   I: Indwelling Catheters   Fonseca necessity:  no   CVC necessity: Yes   IPAD offered: No  D: De-escalation Antibx   Yes  Plan for the day   Continue to monitor CVP/Dopplers/I&O/SVO2.   Family/Goals of care/Code Status   Code Status: Full Code     No acute events throughout day, VS and assessment per flow sheet, patient progressing towards goals as tolerated, plan of care reviewed with Deborah Navas and family, all concerns addressed, will continue to monitor.

## 2019-12-12 NOTE — CONSULTS
[facility]  Kidney Transplant  Consult Note    Consults      Subjective:     History of Present Illness:   50 yo WF with NICM, s/p HM3 implantation 9/10/19 (post up coarse uncomplicated with the exception of+ diaphragmatic stimulation of LV lead and pleural effusion with chest tube placement, atrial flutter with NADINE/DCCV), V-fib reported in setting of hypokalemia with appropriate shock from ICD on Amiodarone prior to LVAD placement; and recent hospitalizations for ventricular arrythmias; started on Mexilitine.  She was seen in EP clinic today and she continues to have multiple VT episodes with some ATP therapy, although no ICD shocks since starting mexiletine 10/24/2019. One slow VT episode 2.5hrs 11/3/2019. Patient asymptomatic with LVAD. On coumadin. No AFL since post-op event (paced out of rhythm 9/24/2019). CHB with 100% V pacing (LV lead off). She does not feel well. Dry heaving with mexiletine. Taking phenergan PRN. 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     She was seen in HTS clinic and reported 4 low flow alarms the last 4 nights.  She reports they occur in her sleep and last for only a few seconds.  By the time she wakes up; they quickly stop.  She does report to possibly being little volume overloaded.  She hasn't noticed weight gain at home but thinks she may have little extra fluid.  She denies SOB, chest pain, palpitations, LEGER. In review of her records: she was 223lbs on 10/24/19 during previous hospitalization and is 235lbs now.  Her lasix had previously been decreased to prn. KTM consulted for evaluation of possible Kidney/Heart co transplant.     Ms. Navas is a 49 y.o. year old female with end-stage heart failure secondary to non-ischemic cardiomyopathy.  She is currently listed for a heart transplant.    Interval History:  Patient seen and examined, breathing on 4 liters of oxygen, 2.8 liters of urine voided over the last 24 hours. ICD  interrogated, no events. She is currently listed as urgent selection tier 2 for heart transplant. On epinephrine infusion.     Past Medical and Surgical History: Ms. Navas has a past medical history of Fractures, History of ventricular fibrillation (9/4/2019), History of ventricular tachycardia (9/4/2019), Hyperlipidemia, Migraine, and Osteoarthritis.  She has a past surgical history that includes Tympanostomy tube placement (1971- 1979); Tonsillectomy; eardrum reconstruction (1980); Temporomandibular joint surgery (Right, 1988); Sinus surgery (Right, 1994); Forearm fracture surgery (Bilateral, 9480-9323); Elbow surgery (Left, [id number]); Lumbar fusion (2007); Back surgery; Bone graft (Left); Insertion of pacemaker (Left); Insertion of implantable cardioverter-defibrillator (ICD) generator with two existing leads; Right heart catheterization (Right, 9/4/2019); Left ventricular assist device (N/A, 9/10/2019); Insertion of graft to pericardium (9/10/2019); Sternal wound closure (9/10/2019); Treatment of cardiac arrhythmia (N/A, 9/24/2019); and Right heart catheterization (N/A, 11/18/2019).    Past Social and Family History: Ms. Navas reports that she has never smoked. She has never used smokeless tobacco. She reports that she does not drink alcohol or use drugs.Her family history includes Broken bones in her maternal grandmother; Cancer in her paternal grandmother; Dislocations in her maternal grandmother; Heart failure in her maternal grandfather and paternal grandfather; Migraines in her maternal grandfather, maternal grandmother, mother, paternal grandfather, and paternal grandmother; Obesity in her paternal grandmother; Osteoarthritis in her father, maternal grandfather, maternal grandmother, mother, paternal grandfather, and paternal grandmother; Osteoporosis in her maternal grandmother; Scoliosis in her maternal grandmother; Stroke in her father.    Intake/Output - Last 3 Shifts       12/09 0700 - 12/10 0059  "12/10 0700 - 12/11 0659 12/11 0700 - 12/12 0659    P.O. 650 1640 600    I.V. (mL/kg) 180 (1.7) 154 (1.5) 66 (0.6)    Total Intake(mL/kg) 830 (7.9) 1794 (17.2) 666 (6.4)    Urine (mL/kg/hr) 2200 (0.9) 2850 (1.1) 1050 (0.9)    Emesis/NG output 0      Stool 0  0    Total Output 2200 2850 1050    Net -1370 -1056 -384           Urine Occurrence 1 x      Stool Occurrence 1 x  1 x    Emesis Occurrence 1 x             Review of Systems   Constitutional: Negative for activity change and appetite change.   HENT: Negative.    Respiratory: Negative.  Negative for shortness of breath.    Cardiovascular: Negative.    Gastrointestinal: Negative.    Genitourinary: Negative.    Musculoskeletal: Negative.      Objective:     Vital Signs (Most Recent):  Temp: 98.3 °F (36.8 °C) (12/11/19 1500)  Pulse: 89 (12/11/19 1700)  Resp: 16 (12/11/19 1700)  BP: (!) 88/0 (12/11/19 1500)  SpO2: 98 % (12/11/19 1600) Vital Signs (24h Range):  Temp:  [97.6 °F (36.4 °C)-98.3 °F (36.8 °C)] 98.3 °F (36.8 °C)  Pulse:  [] 89  Resp:  [16-31] 16  SpO2:  [96 %-98 %] 98 %  BP: (70-92)/(0) 88/0     Weight: 104.4 kg (230 lb 2.6 oz)  Height: 5' 6.75" (169.5 cm)  Body mass index is 36.32 kg/m².    Physical Exam   Constitutional: She is oriented to person, place, and time. She appears well-developed and well-nourished.   HENT:   Mouth/Throat: Oropharynx is clear and moist.   Eyes: EOM are normal.   Neck: JVD present.   Cardiovascular: Normal rate, regular rhythm and intact distal pulses.   Smooth VAD hum   Pulmonary/Chest: Effort normal and breath sounds normal. No respiratory distress. She has no rales.   Abdominal: Soft. Bowel sounds are normal. She exhibits no distension. There is no tenderness. There is no guarding.   Musculoskeletal: She exhibits no edema.   Neurological: She is alert and oriented to person, place, and time.   Skin: Skin is warm.   Psychiatric: She has a normal mood and affect.   Nursing note and vitals reviewed.      Significant " Labs:  CBC:   Recent Labs   Lab 12/09/19  0333 12/10/19  0448 12/11/19  0500   WBC 6.53 6.92 6.11   RBC 4.04 4.02 4.01   HGB 10.0* 9.9* 9.9*   HCT 34.8* 34.7* 34.1*    290 287   MCV 86 86 85   MCH 24.8* 24.6* 24.7*   MCHC 28.7* 28.5* 29.0*     CMP:   Recent Labs   Lab 12/06/19  0316  12/09/19  0333 12/10/19  0448 12/11/19  0500   *   < > 141* 144* 126*   CALCIUM 9.2   < > 9.1 9.1 9.1   ALBUMIN 3.3*  --  3.4*  --  3.4*   PROT 6.6  --  6.8  --  6.9      < > 139 139 140   K 4.0   < > 4.0 4.1 4.1   CO2 28   < > 28 30* 28      < > 99 100 100   BUN 28*   < > 22* 21* 23*   CREATININE 1.9*   < > 1.6* 1.8* 1.8*   ALKPHOS 91  --  89  --  87   ALT 14  --  16  --  16   AST 19  --  21  --  20    < > = values in this interval not displayed.       Diagnostics:  None    Assessment/Plan:     * Kidney transplant candidate  50 yo WF with NICM, s/p HM3 implantation 9/10/19 (post up coarse uncomplicated with the exception of+ diaphragmatic stimulation of LV lead and pleural effusion with chest tube placement, atrial flutter with NADINE/DCCV), ICD placement, approved for Heart transplant. Kidney transplant consulted for evaluation of kidney transplant.     Plan/Recommendations:   - determine chronicity of CKD  - as per care everywhere, patient with GFR<60 since 2016  - CT Scan of the abdomen/Pelvis with IV contrast in 11/14/2019 reveals normal size kidneys  - cystatin C pending  - UA and UPCR within normal limits   - intake and output  - adequate urine output  - patient will need to be discussed in kidney transplant committee on 12/13          GLO (acute kidney injury)  - Baseline creat appears 1.3-1.6. 2.1,uptrending  - Strict I/O and chart   - renally dose all medications   - Avoid nephrotoxic medications, NSAIDs, IV contrast, ACE/ARB.  - Maintain MAP > 65  - Hb > 7 gm/dL   - Will follow closely       LVAD (left ventricular assist device) present  - HeartMate 3 Implanted 9/10/19 as DT.  - Implanted by   [employer]  - management as per Heart transplant     Acute on chronic combined systolic and diastolic heart failure  - NICM  - Last 2D Echo 12/4/19. AV does not open, LVedd 5.5cm @ 5100.   - IV push lasix today 80 TID   - EPI .02, NO @ 20   - CVP 11 and SvO2 54 this AM. CO/CI 5.3/2.4, SVR 1041  - GDMT: D/C ACE 10 BID,  & aldactone   - was on lasix 20 PRN at home PTA  -2g Na dietary restriction, 1500 mL fluid restriction, strict I/Os          Ms. Navas pending criteria for combined heart transplant.  Final decision on her candidacy will be made once her workup is completed and reviewed by the kidney transplant team.    Discharge Planning:  As per primary, will be discussed in committee tomorrow      Jp Goldstein MD  Kidney Transplant  [facility]

## 2019-12-12 NOTE — ASSESSMENT & PLAN NOTE
- HeartMate 3 Implanted 9/10/19 as DT.  - Implanted by Dr. Walden  - management as per Heart transplant

## 2019-12-12 NOTE — NURSING
Rounded on pt along with Olivia and Mrs. Huerta for cheerful rounding. Pt doing well and happy for visit.

## 2019-12-12 NOTE — PT/OT/SLP PROGRESS
Physical Therapy Treatment    Patient Name:  Deborah Navas   MRN:  [id number]    Recommendations:     Discharge Recommendations:  home   Discharge Equipment Recommendations: none   Barriers to discharge: None    Assessment:     Deborah Navas is a 49 y.o. female admitted with a medical diagnosis of Kidney transplant candidate.  She presents with the following impairments/functional limitations:  impaired endurance, gait instability, impaired cardiopulmonary response to activity. Pt progressing towards goals, but not at PLOF. Pt tolerated session well.  Pt is improving with therapy evidenced by increased gait distance with VSS and LVAD numbers stable. Recommend d/c to Home to maximize functional independence.      Rehab Prognosis: Good; patient would benefit from acute skilled PT services to address these deficits and reach maximum level of function.    Recent Surgery: Procedure(s) (LRB):  INSERTION, CATHETER, RIGHT HEART (N/A) 7 Days Post-Op    Plan:     During this hospitalization, patient to be seen 2 x/week to address the identified rehab impairments via gait training, therapeutic activities, therapeutic exercises, neuromuscular re-education and progress toward the following goals:    · Plan of Care Expires:  01/05/20    Subjective     Chief Complaint: being in bed   Patient/Family Comments/goals: to get a heart transplant   Pain/Comfort:  · Pain Rating 1: 0/10  · Pain Rating Post-Intervention 1: 0/10      Objective:     Communicated with RN prior to session.  Patient found HOB elevated with telemetry, pulse ox (continuous), blood pressure cuff, oxygen, central line, peripheral IV, LVAD upon PT entry to room.     General Precautions: Standard, LVAD, fall   Orthopedic Precautions:N/A   Braces: N/A     Functional Mobility:  · Bed Mobility:     · Scooting: independence  · Supine to Sit: independence  · Sit to Supine: independence  · Transfers:     · Sit to Stand:  independence with no AD from EOB x  4 trials   · Gait: 80ft + 80ft with CGA using IV pole for stability and a seated rest break inbetween trials  · No LOB  · Increased SOB  · VSS and VAD numbers stable       AM-PAC 6 CLICK MOBILITY  Turning over in bed (including adjusting bedclothes, sheets and blankets)?: 4  Sitting down on and standing up from a chair with arms (e.g., wheelchair, bedside commode, etc.): 4  Moving from lying on back to sitting on the side of the bed?: 4  Moving to and from a bed to a chair (including a wheelchair)?: 4  Need to walk in hospital room?: 4  Climbing 3-5 steps with a railing?: 4  Basic Mobility Total Score: 24       Therapeutic Activities and Exercises:  Educated pt on PT role/POC  Educated pt on importance of OOB activity and daily ambulation   Pt verbalized understanding    LVAD found and remained on wall power  No alarms sounded    Patient left HOB elevated with all lines intact, call button in reach and RN notified..    GOALS:   Multidisciplinary Problems     Physical Therapy Goals        Problem: Physical Therapy Goal    Goal Priority Disciplines Outcome Goal Variances Interventions   Physical Therapy Goal     PT, PT/OT Ongoing, Progressing     Description:  Goals to be met by: 2019     Patient will increase functional independence with mobility by performin. Gait x 25ft with Supervision - not met                        Time Tracking:     PT Received On: 19  PT Start Time: 1423     PT Stop Time: 1456  PT Total Time (min): 33 min     Billable Minutes: Gait Training 23    Treatment Type: Treatment  PT/PTA: PT     PTA Visit Number: 0     Niurka Posadas PT, DPT  2019  [phone]

## 2019-12-12 NOTE — ASSESSMENT & PLAN NOTE
- Baseline creat appears 1.3-1.6. 2.1 today.  - Will monitor with diuresis   - KTM consult for GFR evaluation due to Cr remaining higher than baseline and fluctuating.

## 2019-12-13 LAB
ALBUMIN SERPL BCP-MCNC: 3.5 G/DL (ref 3.5–5.2)
ALLENS TEST: ABNORMAL
ALLENS TEST: ABNORMAL
ALP SERPL-CCNC: 89 U/L (ref 55–135)
ALT SERPL W/O P-5'-P-CCNC: 16 U/L (ref 10–44)
ANION GAP SERPL CALC-SCNC: 12 MMOL/L (ref 8–16)
APTT BLDCRRT: 35.7 SEC (ref 21–32)
ASCENDING AORTA: 3.27 CM
AST SERPL-CCNC: 18 U/L (ref 10–40)
BASOPHILS # BLD AUTO: 0.07 K/UL (ref 0–0.2)
BASOPHILS NFR BLD: 1 % (ref 0–1.9)
BILIRUB DIRECT SERPL-MCNC: 0.2 MG/DL (ref 0.1–0.3)
BILIRUB SERPL-MCNC: 0.4 MG/DL (ref 0.1–1)
BNP SERPL-MCNC: 503 PG/ML (ref 0–99)
BSA FOR ECHO PROCEDURE: 2.17 M2
BUN SERPL-MCNC: 23 MG/DL (ref 6–20)
CALCIUM SERPL-MCNC: 9.4 MG/DL (ref 8.7–10.5)
CHLORIDE SERPL-SCNC: 97 MMOL/L (ref 95–110)
CO2 SERPL-SCNC: 30 MMOL/L (ref 23–29)
CREAT SERPL-MCNC: 2.1 MG/DL (ref 0.5–1.4)
CRP SERPL-MCNC: 18.7 MG/L (ref 0–8.2)
CV ECHO LV RWT: 0.21 CM
CYSTATIN C SERPL-MCNC: 2.13 MG/L (ref 0.64–1.17)
DELSYS: ABNORMAL
DELSYS: ABNORMAL
DIFFERENTIAL METHOD: ABNORMAL
DOP CALC LVOT AREA: 2.7 CM2
DOP CALC LVOT DIAMETER: 1.86 CM
ECHO LV POSTERIOR WALL: 0.64 CM (ref 0.6–1.1)
EOSINOPHIL # BLD AUTO: 0.2 K/UL (ref 0–0.5)
EOSINOPHIL NFR BLD: 3.4 % (ref 0–8)
ERYTHROCYTE [DISTWIDTH] IN BLOOD BY AUTOMATED COUNT: 17 % (ref 11.5–14.5)
EST. GFR  (AFRICAN AMERICAN): 31 ML/MIN/1.73 M^2
EST. GFR  (NON AFRICAN AMERICAN): 26.9 ML/MIN/1.73 M^2
FLOW: 4
FRACTIONAL SHORTENING: 14 % (ref 28–44)
GFR/BSA.PRED SERPLBLD CYS-BASED-ARV: 28 ML/MIN/BSA
GLUCOSE SERPL-MCNC: 120 MG/DL (ref 70–110)
HCO3 UR-SCNC: 31.1 MMOL/L (ref 24–28)
HCO3 UR-SCNC: 32.3 MMOL/L (ref 24–28)
HCT VFR BLD AUTO: 34.1 % (ref 37–48.5)
HGB BLD-MCNC: 9.7 G/DL (ref 12–16)
IMM GRANULOCYTES # BLD AUTO: 0.03 K/UL (ref 0–0.04)
IMM GRANULOCYTES NFR BLD AUTO: 0.4 % (ref 0–0.5)
INR PPP: 2.5 (ref 0.8–1.2)
INTERVENTRICULAR SEPTUM: 0.64 CM (ref 0.6–1.1)
LA MAJOR: 5.59 CM
LA MINOR: 5.6 CM
LA WIDTH: 2.98 CM
LDH SERPL L TO P-CCNC: 248 U/L (ref 110–260)
LEFT ATRIUM SIZE: 2.92 CM
LEFT ATRIUM VOLUME INDEX: 19.8 ML/M2
LEFT ATRIUM VOLUME: 41.38 CM3
LEFT INTERNAL DIMENSION IN SYSTOLE: 5.18 CM (ref 2.1–4)
LEFT VENTRICLE DIASTOLIC VOLUME INDEX: 87.7 ML/M2
LEFT VENTRICLE DIASTOLIC VOLUME: 183.61 ML
LEFT VENTRICLE MASS INDEX: 69 G/M2
LEFT VENTRICLE SYSTOLIC VOLUME INDEX: 61.4 ML/M2
LEFT VENTRICLE SYSTOLIC VOLUME: 128.56 ML
LEFT VENTRICULAR INTERNAL DIMENSION IN DIASTOLE: 6.05 CM (ref 3.5–6)
LEFT VENTRICULAR MASS: 144.03 G
LYMPHOCYTES # BLD AUTO: 1.5 K/UL (ref 1–4.8)
LYMPHOCYTES NFR BLD: 21.7 % (ref 18–48)
MAGNESIUM SERPL-MCNC: 2.2 MG/DL (ref 1.6–2.6)
MCH RBC QN AUTO: 24.4 PG (ref 27–31)
MCHC RBC AUTO-ENTMCNC: 28.4 G/DL (ref 32–36)
MCV RBC AUTO: 86 FL (ref 82–98)
METHEMOGLOBIN: 0.6 % (ref 0–3)
MODE: ABNORMAL
MONOCYTES # BLD AUTO: 0.6 K/UL (ref 0.3–1)
MONOCYTES NFR BLD: 8.9 % (ref 4–15)
MV PEAK E VEL: 1.46 M/S
NEUTROPHILS # BLD AUTO: 4.6 K/UL (ref 1.8–7.7)
NEUTROPHILS NFR BLD: 64.6 % (ref 38–73)
NRBC BLD-RTO: 0 /100 WBC
PCO2 BLDA: 50.8 MMHG (ref 35–45)
PCO2 BLDA: 52.8 MMHG (ref 35–45)
PH SMN: 7.38 [PH] (ref 7.35–7.45)
PH SMN: 7.41 [PH] (ref 7.35–7.45)
PHOSPHATE SERPL-MCNC: 3.6 MG/DL (ref 2.7–4.5)
PISA TR MAX VEL: 1.81 M/S
PLATELET # BLD AUTO: 311 K/UL (ref 150–350)
PMV BLD AUTO: 10.5 FL (ref 9.2–12.9)
PO2 BLDA: 26 MMHG (ref 40–60)
PO2 BLDA: 26 MMHG (ref 40–60)
POC BE: 6 MMOL/L
POC BE: 8 MMOL/L
POC SATURATED O2: 44 % (ref 95–100)
POC SATURATED O2: 48 % (ref 95–100)
POC TCO2: 33 MMOL/L (ref 24–29)
POC TCO2: 34 MMOL/L (ref 24–29)
POTASSIUM SERPL-SCNC: 4.1 MMOL/L (ref 3.5–5.1)
PREALB SERPL-MCNC: 28 MG/DL (ref 20–43)
PROT SERPL-MCNC: 7.1 G/DL (ref 6–8.4)
PROTHROMBIN TIME: 24.1 SEC (ref 9–12.5)
RA MAJOR: 5.2 CM
RA PRESSURE: 15 MMHG
RA WIDTH: 5.5 CM
RBC # BLD AUTO: 3.97 M/UL (ref 4–5.4)
RIGHT VENTRICULAR END-DIASTOLIC DIMENSION: 4.9 CM
SAMPLE: ABNORMAL
SAMPLE: ABNORMAL
SINUS: 3.08 CM
SITE: ABNORMAL
SITE: ABNORMAL
SODIUM SERPL-SCNC: 139 MMOL/L (ref 136–145)
STJ: 2.84 CM
TR MAX PG: 13 MMHG
TRICUSPID ANNULAR PLANE SYSTOLIC EXCURSION: 1.14 CM
TV REST PULMONARY ARTERY PRESSURE: 28 MMHG
WBC # BLD AUTO: 7.06 K/UL (ref 3.9–12.7)

## 2019-12-13 PROCEDURE — 99291 PR CRITICAL CARE, E/M 30-74 MINUTES: ICD-10-PCS | Mod: NTX,,, | Performed by: INTERNAL MEDICINE

## 2019-12-13 PROCEDURE — 83735 ASSAY OF MAGNESIUM: CPT | Mod: NTX

## 2019-12-13 PROCEDURE — 82803 BLOOD GASES ANY COMBINATION: CPT | Mod: NTX

## 2019-12-13 PROCEDURE — 63600175 PHARM REV CODE 636 W HCPCS: Mod: NTX | Performed by: PHYSICIAN ASSISTANT

## 2019-12-13 PROCEDURE — 96366 THER/PROPH/DIAG IV INF ADDON: CPT

## 2019-12-13 PROCEDURE — 94761 N-INVAS EAR/PLS OXIMETRY MLT: CPT | Mod: NTX

## 2019-12-13 PROCEDURE — 25000003 PHARM REV CODE 250: Mod: NTX | Performed by: PHYSICIAN ASSISTANT

## 2019-12-13 PROCEDURE — 80048 BASIC METABOLIC PNL TOTAL CA: CPT | Mod: NTX

## 2019-12-13 PROCEDURE — 63600175 PHARM REV CODE 636 W HCPCS: Mod: NTX | Performed by: STUDENT IN AN ORGANIZED HEALTH CARE EDUCATION/TRAINING PROGRAM

## 2019-12-13 PROCEDURE — 85730 THROMBOPLASTIN TIME PARTIAL: CPT | Mod: NTX

## 2019-12-13 PROCEDURE — 83615 LACTATE (LD) (LDH) ENZYME: CPT | Mod: NTX

## 2019-12-13 PROCEDURE — 25000003 PHARM REV CODE 250: Mod: NTX | Performed by: INTERNAL MEDICINE

## 2019-12-13 PROCEDURE — 86140 C-REACTIVE PROTEIN: CPT | Mod: NTX

## 2019-12-13 PROCEDURE — 80076 HEPATIC FUNCTION PANEL: CPT | Mod: NTX

## 2019-12-13 PROCEDURE — 84134 ASSAY OF PREALBUMIN: CPT | Mod: NTX

## 2019-12-13 PROCEDURE — 99291 CRITICAL CARE FIRST HOUR: CPT | Mod: NTX,,, | Performed by: INTERNAL MEDICINE

## 2019-12-13 PROCEDURE — 96365 THER/PROPH/DIAG IV INF INIT: CPT

## 2019-12-13 PROCEDURE — 27000248 HC VAD-ADDITIONAL DAY: Mod: NTX

## 2019-12-13 PROCEDURE — 99900035 HC TECH TIME PER 15 MIN (STAT): Mod: NTX

## 2019-12-13 PROCEDURE — 83880 ASSAY OF NATRIURETIC PEPTIDE: CPT | Mod: NTX

## 2019-12-13 PROCEDURE — 96375 TX/PRO/DX INJ NEW DRUG ADDON: CPT

## 2019-12-13 PROCEDURE — 20000000 HC ICU ROOM: Mod: NTX

## 2019-12-13 PROCEDURE — 99291 CRITICAL CARE FIRST HOUR: CPT | Mod: NTX,,, | Performed by: PHYSICIAN ASSISTANT

## 2019-12-13 PROCEDURE — 85610 PROTHROMBIN TIME: CPT | Mod: NTX

## 2019-12-13 PROCEDURE — 99291 PR CRITICAL CARE, E/M 30-74 MINUTES: ICD-10-PCS | Mod: NTX,,, | Performed by: PHYSICIAN ASSISTANT

## 2019-12-13 PROCEDURE — 84100 ASSAY OF PHOSPHORUS: CPT | Mod: NTX

## 2019-12-13 PROCEDURE — 85025 COMPLETE CBC W/AUTO DIFF WBC: CPT | Mod: NTX

## 2019-12-13 PROCEDURE — 96376 TX/PRO/DX INJ SAME DRUG ADON: CPT

## 2019-12-13 PROCEDURE — 63600367 HC NITRIC OXIDE PER HOUR: Mod: NTX

## 2019-12-13 PROCEDURE — 27000221 HC OXYGEN, UP TO 24 HOURS: Mod: NTX

## 2019-12-13 PROCEDURE — 25000003 PHARM REV CODE 250: Mod: NTX | Performed by: NURSE PRACTITIONER

## 2019-12-13 RX ADMIN — SENNOSIDES AND DOCUSATE SODIUM 1 TABLET: 8.6; 5 TABLET ORAL at 09:12

## 2019-12-13 RX ADMIN — HYDRALAZINE HYDROCHLORIDE 50 MG: 50 TABLET ORAL at 01:12

## 2019-12-13 RX ADMIN — POTASSIUM CHLORIDE 40 MEQ: 1500 TABLET, EXTENDED RELEASE ORAL at 09:12

## 2019-12-13 RX ADMIN — PANTOPRAZOLE SODIUM 40 MG: 40 TABLET, DELAYED RELEASE ORAL at 06:12

## 2019-12-13 RX ADMIN — AMIODARONE HYDROCHLORIDE 400 MG: 200 TABLET ORAL at 09:12

## 2019-12-13 RX ADMIN — MEXILETINE HYDROCHLORIDE 200 MG: 200 CAPSULE ORAL at 01:12

## 2019-12-13 RX ADMIN — MIRTAZAPINE 15 MG: 15 TABLET, FILM COATED ORAL at 09:12

## 2019-12-13 RX ADMIN — FAMOTIDINE 20 MG: 20 TABLET ORAL at 09:12

## 2019-12-13 RX ADMIN — POLYETHYLENE GLYCOL 3350 17 G: 17 POWDER, FOR SOLUTION ORAL at 10:12

## 2019-12-13 RX ADMIN — SUCRALFATE 1 G: 1 SUSPENSION ORAL at 06:12

## 2019-12-13 RX ADMIN — AMIODARONE HYDROCHLORIDE 400 MG: 200 TABLET ORAL at 10:12

## 2019-12-13 RX ADMIN — MEXILETINE HYDROCHLORIDE 200 MG: 200 CAPSULE ORAL at 06:12

## 2019-12-13 RX ADMIN — SUCRALFATE 1 G: 1 SUSPENSION ORAL at 09:12

## 2019-12-13 RX ADMIN — MAGNESIUM OXIDE TAB 400 MG (241.3 MG ELEMENTAL MG) 400 MG: 400 (241.3 MG) TAB at 10:12

## 2019-12-13 RX ADMIN — SUCRALFATE 1 G: 1 SUSPENSION ORAL at 01:12

## 2019-12-13 RX ADMIN — ZOLPIDEM TARTRATE 5 MG: 5 TABLET ORAL at 09:12

## 2019-12-13 RX ADMIN — FUROSEMIDE 80 MG: 10 INJECTION, SOLUTION INTRAMUSCULAR; INTRAVENOUS at 02:12

## 2019-12-13 RX ADMIN — GABAPENTIN 200 MG: 100 CAPSULE ORAL at 09:12

## 2019-12-13 RX ADMIN — VENLAFAXINE 150 MG: 37.5 TABLET ORAL at 10:12

## 2019-12-13 RX ADMIN — SENNOSIDES AND DOCUSATE SODIUM 1 TABLET: 8.6; 5 TABLET ORAL at 10:12

## 2019-12-13 RX ADMIN — MEXILETINE HYDROCHLORIDE 200 MG: 200 CAPSULE ORAL at 09:12

## 2019-12-13 RX ADMIN — HYDRALAZINE HYDROCHLORIDE 50 MG: 50 TABLET ORAL at 06:12

## 2019-12-13 RX ADMIN — ASPIRIN 325 MG: 325 TABLET, DELAYED RELEASE ORAL at 10:12

## 2019-12-13 RX ADMIN — FUROSEMIDE 80 MG: 10 INJECTION, SOLUTION INTRAMUSCULAR; INTRAVENOUS at 10:12

## 2019-12-13 RX ADMIN — FUROSEMIDE 80 MG: 10 INJECTION, SOLUTION INTRAMUSCULAR; INTRAVENOUS at 09:12

## 2019-12-13 RX ADMIN — POTASSIUM CHLORIDE 40 MEQ: 1500 TABLET, EXTENDED RELEASE ORAL at 10:12

## 2019-12-13 RX ADMIN — PANTOPRAZOLE SODIUM 40 MG: 40 TABLET, DELAYED RELEASE ORAL at 04:12

## 2019-12-13 RX ADMIN — EPINEPHRINE 0.02 MCG/KG/MIN: 1 INJECTION PARENTERAL at 02:12

## 2019-12-13 RX ADMIN — GABAPENTIN 200 MG: 100 CAPSULE ORAL at 10:12

## 2019-12-13 RX ADMIN — HYDRALAZINE HYDROCHLORIDE 50 MG: 50 TABLET ORAL at 09:12

## 2019-12-13 NOTE — PROGRESS NOTES
[facility]  Heart Transplant  Progress Note    Patient Name: Deborah Navas  MRN: [id number]  Admission Date: 11/13/2019  Hospital Length of Stay: 28 days  Attending Physician: Jolene Lua MD  Primary Care Provider: Primary Doctor No  Principal Problem:Kidney transplant candidate    Subjective:     Interval History: CVP 11, seen by kidney team who does not recommend dual heart/kidney at this time     Continuous Infusions:   epinephrine 0.02 mcg/kg/min (12/13/19 0600)    nitric oxide gas       Scheduled Meds:   amiodarone  400 mg Oral BID    aspirin  325 mg Oral Daily    famotidine  20 mg Oral QHS    furosemide  80 mg Intravenous TID    gabapentin  200 mg Oral BID    hydrALAZINE  50 mg Oral Q8H    magnesium oxide  400 mg Oral Daily    mexiletine  200 mg Oral Q8H    mirtazapine  15 mg Oral QHS    pantoprazole  40 mg Oral BID AC    polyethylene glycol  17 g Oral Daily    potassium chloride  40 mEq Oral BID    senna-docusate 8.6-50 mg  1 tablet Oral BID    sucralfate  1 g Oral QID (AC & HS)    venlafaxine  150 mg Oral Daily     PRN Meds:ALPRAZolam, ondansetron, pneumoc 13-amber conj-dip cr(PF), promethazine, sodium chloride, white petrolatum, zolpidem    Review of patient's allergies indicates:   Allergen Reactions    Adhesive Blisters     Reaction to area in chest and up only    Codeine Itching     Objective:     Vital Signs (Most Recent):  Temp: 98.1 °F (36.7 °C) (12/13/19 0300)  Pulse: 85 (12/13/19 0600)  Resp: 19 (12/13/19 0600)  BP: (!) 76/0 (12/13/19 0652)  SpO2: 98 % (12/13/19 0300) Vital Signs (24h Range):  Temp:  [96.8 °F (36 °C)-98.1 °F (36.7 °C)] 98.1 °F (36.7 °C)  Pulse:  [] 85  Resp:  [] 19  SpO2:  [97 %-99 %] 98 %  BP: ()/(0-93) 76/0     Patient Vitals for the past 72 hrs (Last 3 readings):   Weight   12/13/19 0300 95.5 kg (210 lb 8.6 oz)   12/12/19 0300 105 kg (231 lb 7.7 oz)   12/11/19 0300 104.4 kg (230 lb 2.6 oz)     Body mass index is  33.98 kg/m².      Intake/Output Summary (Last 24 hours) at 12/13/2019 0756  Last data filed at 12/13/2019 0626  Gross per 24 hour   Intake 1137.9 ml   Output 3201 ml   Net -2063.1 ml       Hemodynamic Parameters:         Physical Exam   Constitutional: She is oriented to person, place, and time. She appears well-developed and well-nourished.   HENT:   Mouth/Throat: Oropharynx is clear and moist.   Eyes: EOM are normal.   Neck: JVD present.   Cardiovascular: Normal rate, regular rhythm and intact distal pulses.   Smooth VAD hum   Pulmonary/Chest: Effort normal and breath sounds normal. No respiratory distress. She has no rales.   Abdominal: Soft. Bowel sounds are normal. She exhibits no distension. There is no tenderness. There is no guarding.   Musculoskeletal: She exhibits no edema.   Neurological: She is alert and oriented to person, place, and time.   Skin: Skin is warm.   Psychiatric: She has a normal mood and affect.   Nursing note and vitals reviewed.      Significant Labs:  CBC:  Recent Labs   Lab 12/11/19  0500 12/12/19  0212 12/13/19  0400   WBC 6.11 7.37 7.06   RBC 4.01 4.07 3.97*   HGB 9.9* 9.9* 9.7*   HCT 34.1* 34.7* 34.1*    317 311   MCV 85 85 86   MCH 24.7* 24.3* 24.4*   MCHC 29.0* 28.5* 28.4*     BNP:  Recent Labs   Lab 12/09/19  0333 12/11/19  0500 12/13/19  0400   * 369* 503*     CMP:  Recent Labs   Lab 12/09/19  0333  12/11/19  0500 12/12/19  0212 12/13/19  0400   *   < > 126* 131* 120*   CALCIUM 9.1   < > 9.1 9.3 9.4   ALBUMIN 3.4*  --  3.4*  --  3.5   PROT 6.8  --  6.9  --  7.1      < > 140 137 139   K 4.0   < > 4.1 4.5 4.1   CO2 28   < > 28 28 30*   CL 99   < > 100 97 97   BUN 22*   < > 23* 24* 23*   CREATININE 1.6*   < > 1.8* 2.1* 2.1*   ALKPHOS 89  --  87  --  89   ALT 16  --  16  --  16   AST 21  --  20  --  18   BILITOT 0.3  --  0.4  --  0.4    < > = values in this interval not displayed.      Coagulation:   Recent Labs   Lab 12/11/19  0500 12/12/19  0216  12/13/19  0400   INR 3.2* 3.2* 2.5*   APTT 39.9* 39.6* 35.7*     LDH:  Recent Labs   Lab 12/11/19  0500 12/12/19  0212 12/13/19  0400    273* 248     Microbiology:  Microbiology Results (last 7 days)     ** No results found for the last 168 hours. **          I have reviewed all pertinent labs within the past 24 hours.    Estimated Creatinine Clearance: 37.3 mL/min (A) (based on SCr of 2.1 mg/dL (H)).    Diagnostic Results:  I have reviewed and interpreted all pertinent imaging results/findings within the past 24 hours.    Assessment and Plan:     48 yo WF with NICM, s/p HM3 implantation 9/10/19 (post up coarse uncomplicated with the exception of+ diaphragmatic stimulation of LV lead and pleural effusion with chest tube placement, atrial flutter with NADINE/DCCV), V-fib reported in setting of hypokalemia with appropriate shock from ICD on Amiodarone prior to LVAD placement; and recent hospitalizations for ventricular arrythmias; started on Mexilitine.  She was seen in EP clinic today and she continues to have multiple VT episodes with some ATP therapy, although no ICD shocks since starting mexiletine 10/24/2019. One slow VT episode 2.5hrs 11/3/2019. Patient asymptomatic with LVAD. On coumadin. No AFL since post-op event (paced out of rhythm 9/24/2019). CHB with 100% V pacing (LV lead off). She does not feel well. Dry heaving with mexiletine. Taking phenergan PRN. 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   She was seen in HTS clinic and reported 4 low flow alarms the last 4 nights.  She reports they occur in her sleep and last for only a few seconds.  By the time she wakes up; they quickly stop.  She does report to possibly being little volume overloaded.  She hasn't noticed weight gain at home but thinks she may have little extra fluid.  She denies SOB, chest pain, palpitations, LEGER. In review of her records: she was 223lbs on 10/24/19 during previous hospitalization and is  235lbs now.  Her lasix had previously been decreased to prn.     Nausea  - has intermittent nausea/vomiting, likely multifactorial with RVF and mexiletine  - on BID PPI as well as sucralfate, and H2 blocker   - judicious use of zofran and phenergan due to hx of VT storm    GLO (acute kidney injury)  - see CKD    Organ transplant candidate  - will list with exemption due to VT with ICD shocks and RV failure (will not tolerate ) and does not tolerate NO wean  - approved for transplant on 11/26. Tier 2.     Left ventricular assist device (LVAD) complication  - hx low flow alarms prior to admit, last LFA 12/1  - Possibly secondary to ADHF--> RV failure  - Formal report of CT unremarkable; however, it was a non contrast as creat was elevated above baseline on admit and inflow and outflow cannulas not accurately visualized    Essential hypertension  - Patient is non-pulsatile  - Doppler goal 60-90 mmHg, currently borderline high  - Antihypertensive medications include: hydralazine 25 Q8H      Ventricular tachycardia  - Per EP office visit on day of admission: She continues to have multiple VT episodes with some ATP therapy, although no ICD shocks since starting mexiletine 10/24/2019. One slow VT episode 2.5hrs 11/3/2019. Patient asymptomatic with LVAD. On coumadin. No AFL since post-op event (paced out of rhythm 9/24/2019). CHB with 100% V pacing (LV lead off).   - Plan was to continue current regimen despite Mexiletine making her nauseous.  - Decreased Amiodarone to daily per EP plan on discharge last hospitalization   - Shocked x 3 11/25 for MMVT (in addition has had numerous successful ATP's for MMVT)   - S/P amio load   - now on Amio 400 BID and mexiletine increased to 200  - ICD interrogation 12/10 due to palpitations per Dr Lua- no events noted    LVAD (left ventricular assist device) present  - HeartMate 3 Implanted 9/10/19 as DT.  - Implanted by Dr. Walden  - INR theraputic.   - per Dr Lua will d/c  coumadin, let drift down and bridge with heparin in preparation for OHTx  - Antiplatelets:  mg.  - LDH is stable Will monitor daily.   - Speed set at 5100  - Epi @ .02, NO @ 20.   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 Urgent selection 11/26, approved for transplant. Listed tier 2.    Procedure: Device Interrogation Including analysis of device parameters  Current Settings: Ventricular Assist Device  Review of device function is stable    TXP LVAD INTERROGATIONS 12/13/2019 12/13/2019 12/13/2019 12/13/2019 12/13/2019 12/13/2019 12/12/2019   Type HeartMate3 HeartMate3 HeartMate3 HeartMate3 HeartMate3 HeartMate3 HeartMate3   Flow 3.8 3.7 3.6 3.7 3.7 3.9 3.8   Speed 5100 5100 5100 5100 5100 5100 5100   PI 4.4 4.8 4.9 4.5 4.8 4.1 4.4   Power ([place]) 3.5 3.5 3.4 3.6 3.5 3.5 3.5   LSL 4700 4700 4700 4700 4700 4700 4700   Pulsatility Intermittent pulse Intermittent pulse Intermittent pulse Intermittent pulse Intermittent pulse Intermittent pulse Intermittent pulse       CKD (chronic kidney disease)  - Baseline creat appears 1.3-1.6.   - Will monitor with diuresis   - KTM consult for GFR evaluation due to Cr remaining higher than baseline and fluctuating.     Acute on chronic combined systolic and diastolic heart failure  - NICM  - Last 2D Echo 12/4/19. AV does not open, LVedd 5.5cm @ 5100.   - IV push lasix today 80 TID   - EPI .02, NO @ 20   - GDMT: hydralazine for BP control   - was on lasix 20 PRN at home PTA  -2g Na dietary restriction, 1500 mL fluid restriction, strict I/Os      ICD (implantable cardioverter-defibrillator) in place  - [employer] ICD  - Shocked x 3 11/25 for MMVT   - Loaded with IV amio on 11/25. Now on amio 400 PO BID and mexiletine increased to 200 QD   - See above VT    Uninterrupted Critical Care/Counseling Time (not including procedures): 30 minutes      ZAC [place]  Heart Transplant  [facility]

## 2019-12-13 NOTE — ASSESSMENT & PLAN NOTE
- HeartMate 3 Implanted 9/10/19 as DT.  - Implanted by Dr. Walden  - INR theraputic.   - per Dr Lua will d/c coumadin, let drift down and bridge with heparin in preparation for OHTx  - Antiplatelets:  mg.  - LDH is stable Will monitor daily.   - Speed set at 5100  - Epi @ .02, NO @ 20.   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 Urgent selection 11/26, approved for transplant. Listed tier 2.    Procedure: Device Interrogation Including analysis of device parameters  Current Settings: Ventricular Assist Device  Review of device function is stable    TXP LVAD INTERROGATIONS 12/13/2019 12/13/2019 12/13/2019 12/13/2019 12/13/2019 12/13/2019 12/12/2019   Type HeartMate3 HeartMate3 HeartMate3 HeartMate3 HeartMate3 HeartMate3 HeartMate3   Flow 3.8 3.7 3.6 3.7 3.7 3.9 3.8   Speed 5100 5100 5100 5100 5100 5100 5100   PI 4.4 4.8 4.9 4.5 4.8 4.1 4.4   Power ([place]) 3.5 3.5 3.4 3.6 3.5 3.5 3.5   LSL 4700 4700 4700 4700 4700 4700 4700   Pulsatility Intermittent pulse Intermittent pulse Intermittent pulse Intermittent pulse Intermittent pulse Intermittent pulse Intermittent pulse

## 2019-12-13 NOTE — ASSESSMENT & PLAN NOTE
- Baseline creat appears 1.3-1.6.   - Will monitor with diuresis   - KTM consult for GFR evaluation due to Cr remaining higher than baseline and fluctuating.

## 2019-12-13 NOTE — PROGRESS NOTES
Update:    SW met with pt alone in pt's room in order to provide continuity of care and emotional support. Pt AAOx4, pleasant and engaged. Pt reports feeling much better and no longer nauseous. Pt states that sometimes medications and anxiety cause her nausea. SW provided validation and emotional support. Pt states that she enjoyed the pug that came to her room with the pet therapist earlier this week. Pt reports that she is hoping for another visit from pet therapist again next week. Pt reports sister or friend Flavia will visit this weekend. Pt reports that she thoroughly enjoyed her visit with Olivia Greene yesterday. Per pt, Olivia Greene was visiting the patients at the [facility] and was able to stop into patient's room. Pt reports coping adequately at this time and denied any further needs. SW remains available for continued psychosocial support, education, resources, and additional d/c planning as needed.

## 2019-12-13 NOTE — ASSESSMENT & PLAN NOTE
- NICM  - Last 2D Echo 12/4/19. AV does not open, LVedd 5.5cm @ 5100.   - IV push lasix today 80 TID   - EPI .02, NO @ 20   - GDMT: hydralazine for BP control   - was on lasix 20 PRN at home PTA  -2g Na dietary restriction, 1500 mL fluid restriction, strict I/Os

## 2019-12-13 NOTE — SUBJECTIVE & OBJECTIVE
Interval History: CVP 11, seen by kidney team who does not recommend dual heart/kidney at this time     Continuous Infusions:   epinephrine 0.02 mcg/kg/min (12/13/19 0600)    nitric oxide gas       Scheduled Meds:   amiodarone  400 mg Oral BID    aspirin  325 mg Oral Daily    famotidine  20 mg Oral QHS    furosemide  80 mg Intravenous TID    gabapentin  200 mg Oral BID    hydrALAZINE  50 mg Oral Q8H    magnesium oxide  400 mg Oral Daily    mexiletine  200 mg Oral Q8H    mirtazapine  15 mg Oral QHS    pantoprazole  40 mg Oral BID AC    polyethylene glycol  17 g Oral Daily    potassium chloride  40 mEq Oral BID    senna-docusate 8.6-50 mg  1 tablet Oral BID    sucralfate  1 g Oral QID (AC & HS)    venlafaxine  150 mg Oral Daily     PRN Meds:ALPRAZolam, ondansetron, pneumoc 13-amber conj-dip cr(PF), promethazine, sodium chloride, white petrolatum, zolpidem    Review of patient's allergies indicates:   Allergen Reactions    Adhesive Blisters     Reaction to area in chest and up only    Codeine Itching     Objective:     Vital Signs (Most Recent):  Temp: 98.1 °F (36.7 °C) (12/13/19 0300)  Pulse: 85 (12/13/19 0600)  Resp: 19 (12/13/19 0600)  BP: (!) 76/0 (12/13/19 0652)  SpO2: 98 % (12/13/19 0300) Vital Signs (24h Range):  Temp:  [96.8 °F (36 °C)-98.1 °F (36.7 °C)] 98.1 °F (36.7 °C)  Pulse:  [] 85  Resp:  [] 19  SpO2:  [97 %-99 %] 98 %  BP: ()/(0-93) 76/0     Patient Vitals for the past 72 hrs (Last 3 readings):   Weight   12/13/19 0300 95.5 kg (210 lb 8.6 oz)   12/12/19 0300 105 kg (231 lb 7.7 oz)   12/11/19 0300 104.4 kg (230 lb 2.6 oz)     Body mass index is 33.98 kg/m².      Intake/Output Summary (Last 24 hours) at 12/13/2019 0756  Last data filed at 12/13/2019 0626  Gross per 24 hour   Intake 1137.9 ml   Output 3201 ml   Net -2063.1 ml       Hemodynamic Parameters:         Physical Exam   Constitutional: She is oriented to person, place, and time. She appears well-developed and  well-nourished.   HENT:   Mouth/Throat: Oropharynx is clear and moist.   Eyes: EOM are normal.   Neck: JVD present.   Cardiovascular: Normal rate, regular rhythm and intact distal pulses.   Smooth VAD hum   Pulmonary/Chest: Effort normal and breath sounds normal. No respiratory distress. She has no rales.   Abdominal: Soft. Bowel sounds are normal. She exhibits no distension. There is no tenderness. There is no guarding.   Musculoskeletal: She exhibits no edema.   Neurological: She is alert and oriented to person, place, and time.   Skin: Skin is warm.   Psychiatric: She has a normal mood and affect.   Nursing note and vitals reviewed.      Significant Labs:  CBC:  Recent Labs   Lab 12/11/19  0500 12/12/19  0212 12/13/19  0400   WBC 6.11 7.37 7.06   RBC 4.01 4.07 3.97*   HGB 9.9* 9.9* 9.7*   HCT 34.1* 34.7* 34.1*    317 311   MCV 85 85 86   MCH 24.7* 24.3* 24.4*   MCHC 29.0* 28.5* 28.4*     BNP:  Recent Labs   Lab 12/09/19  0333 12/11/19  0500 12/13/19  0400   * 369* 503*     CMP:  Recent Labs   Lab 12/09/19  0333  12/11/19  0500 12/12/19  0212 12/13/19  0400   *   < > 126* 131* 120*   CALCIUM 9.1   < > 9.1 9.3 9.4   ALBUMIN 3.4*  --  3.4*  --  3.5   PROT 6.8  --  6.9  --  7.1      < > 140 137 139   K 4.0   < > 4.1 4.5 4.1   CO2 28   < > 28 28 30*   CL 99   < > 100 97 97   BUN 22*   < > 23* 24* 23*   CREATININE 1.6*   < > 1.8* 2.1* 2.1*   ALKPHOS 89  --  87  --  89   ALT 16  --  16  --  16   AST 21  --  20  --  18   BILITOT 0.3  --  0.4  --  0.4    < > = values in this interval not displayed.      Coagulation:   Recent Labs   Lab 12/11/19  0500 12/12/19  0212 12/13/19  0400   INR 3.2* 3.2* 2.5*   APTT 39.9* 39.6* 35.7*     LDH:  Recent Labs   Lab 12/11/19  0500 12/12/19  0212 12/13/19  0400    273* 248     Microbiology:  Microbiology Results (last 7 days)     ** No results found for the last 168 hours. **          I have reviewed all pertinent labs within the past 24  hours.    Estimated Creatinine Clearance: 37.3 mL/min (A) (based on SCr of 2.1 mg/dL (H)).    Diagnostic Results:  I have reviewed and interpreted all pertinent imaging results/findings within the past 24 hours.

## 2019-12-13 NOTE — PLAN OF CARE
CMICU DAILY GOALS       A: Awake    RASS: Goal - RASS Goal: 0-->alert and calm  Actual - RASS (Patel Agitation-Sedation Scale): 0-->alert and calm   Restraint necessity:    B: Breath   SBT: Not intubated   C: Coordinate A & B, analgesics/sedatives   Pain: managed    SAT: Not intubated  D: Delirium   CAM-ICU: Overall CAM-ICU: Negative  E: Early Mobility   MOVE Screen: Pass   Activity: Activity Management: activity adjusted per tolerance, activity clustered for rest period  FAS: Feeding/Nutrition   Diet order: Diet/Nutrition Received: NPO,   Fluid restriction: Fluid Requirement: 2 L FR  T: Thrombus   DVT prophylaxis: VTE Required Core Measure: (SCDs) Sequential compression device initiated/maintained, Pharmacological prophylaxis initiated/maintained  H: HOB Elevation   Head of Bed (HOB): HOB at 20-30 degrees  U: Ulcer Prophylaxis   GI: yes  G: Glucose control   managed    S: Skin   Bundle compliance: yes   Bathing/Skin Care: back care, bath, chlorhexidine, bath, complete, dressed/undressed, linen changed Date: [unfilled]  12/12/19  B: Bowel Function   no issues   I: Indwelling Catheters   Fonseca necessity:     CVC necessity: Yes   IPAD offered: No  D: De-escalation Antibx   Yes  Plan for the day   Echo and possible heart cath. Monitor cvp,svo2 and I &O  Family/Goals of care/Code Status   Code Status: Full Code     No acute events throughout day, VS and assessment per flow sheet, patient progressing towards goals as tolerated, plan of care reviewed with Deborah Navas and family, all concerns addressed, will continue to monitor.

## 2019-12-13 NOTE — PROGRESS NOTES
12/13/2019  Trevin Sow    Current provider:  Jolene Lua MD      I, Trevin Sow, rounded on Deborah Navas to ensure all mechanical assist device settings (IABP or VAD) were appropriate and all parameters were within limits.  I was able to ensure all back up equipment was present, the staff had no issues, and the Perfusion Department daily rounding was complete.    1:20 PM

## 2019-12-14 LAB
ALLENS TEST: ABNORMAL
ANION GAP SERPL CALC-SCNC: 10 MMOL/L (ref 8–16)
ANION GAP SERPL CALC-SCNC: 12 MMOL/L (ref 8–16)
APTT BLDCRRT: 35.6 SEC (ref 21–32)
BASOPHILS # BLD AUTO: 0.04 K/UL (ref 0–0.2)
BASOPHILS NFR BLD: 0.5 % (ref 0–1.9)
BUN SERPL-MCNC: 22 MG/DL (ref 6–20)
BUN SERPL-MCNC: 23 MG/DL (ref 6–20)
CALCIUM SERPL-MCNC: 9.2 MG/DL (ref 8.7–10.5)
CALCIUM SERPL-MCNC: 9.4 MG/DL (ref 8.7–10.5)
CHLORIDE SERPL-SCNC: 97 MMOL/L (ref 95–110)
CHLORIDE SERPL-SCNC: 98 MMOL/L (ref 95–110)
CO2 SERPL-SCNC: 29 MMOL/L (ref 23–29)
CO2 SERPL-SCNC: 31 MMOL/L (ref 23–29)
CREAT SERPL-MCNC: 1.9 MG/DL (ref 0.5–1.4)
CREAT SERPL-MCNC: 2.1 MG/DL (ref 0.5–1.4)
DELSYS: ABNORMAL
DIFFERENTIAL METHOD: ABNORMAL
EOSINOPHIL # BLD AUTO: 0.1 K/UL (ref 0–0.5)
EOSINOPHIL NFR BLD: 1.8 % (ref 0–8)
ERYTHROCYTE [DISTWIDTH] IN BLOOD BY AUTOMATED COUNT: 17.2 % (ref 11.5–14.5)
EST. GFR  (AFRICAN AMERICAN): 31 ML/MIN/1.73 M^2
EST. GFR  (AFRICAN AMERICAN): 34.9 ML/MIN/1.73 M^2
EST. GFR  (NON AFRICAN AMERICAN): 26.9 ML/MIN/1.73 M^2
EST. GFR  (NON AFRICAN AMERICAN): 30.3 ML/MIN/1.73 M^2
GLUCOSE SERPL-MCNC: 147 MG/DL (ref 70–110)
GLUCOSE SERPL-MCNC: 97 MG/DL (ref 70–110)
HCO3 UR-SCNC: 29.7 MMOL/L (ref 24–28)
HCO3 UR-SCNC: 30.6 MMOL/L (ref 24–28)
HCO3 UR-SCNC: 33.1 MMOL/L (ref 24–28)
HCT VFR BLD AUTO: 34 % (ref 37–48.5)
HGB BLD-MCNC: 9.6 G/DL (ref 12–16)
IMM GRANULOCYTES # BLD AUTO: 0.02 K/UL (ref 0–0.04)
IMM GRANULOCYTES NFR BLD AUTO: 0.3 % (ref 0–0.5)
INR PPP: 2.2 (ref 0.8–1.2)
LDH SERPL L TO P-CCNC: 257 U/L (ref 110–260)
LYMPHOCYTES # BLD AUTO: 1.1 K/UL (ref 1–4.8)
LYMPHOCYTES NFR BLD: 13.7 % (ref 18–48)
MAGNESIUM SERPL-MCNC: 2.2 MG/DL (ref 1.6–2.6)
MCH RBC QN AUTO: 24.3 PG (ref 27–31)
MCHC RBC AUTO-ENTMCNC: 28.2 G/DL (ref 32–36)
MCV RBC AUTO: 86 FL (ref 82–98)
METHEMOGLOBIN: 0.2 % (ref 0–3)
MONOCYTES # BLD AUTO: 0.6 K/UL (ref 0.3–1)
MONOCYTES NFR BLD: 8 % (ref 4–15)
NEUTROPHILS # BLD AUTO: 6 K/UL (ref 1.8–7.7)
NEUTROPHILS NFR BLD: 75.7 % (ref 38–73)
NRBC BLD-RTO: 0 /100 WBC
PCO2 BLDA: 48.2 MMHG (ref 35–45)
PCO2 BLDA: 49.1 MMHG (ref 35–45)
PCO2 BLDA: 53.6 MMHG (ref 35–45)
PH SMN: 7.4 [PH] (ref 7.35–7.45)
PHOSPHATE SERPL-MCNC: 3.7 MG/DL (ref 2.7–4.5)
PLATELET # BLD AUTO: 309 K/UL (ref 150–350)
PMV BLD AUTO: 10.2 FL (ref 9.2–12.9)
PO2 BLDA: 30 MMHG (ref 40–60)
PO2 BLDA: 30 MMHG (ref 40–60)
PO2 BLDA: 33 MMHG (ref 40–60)
POC BE: 5 MMOL/L
POC BE: 6 MMOL/L
POC BE: 8 MMOL/L
POC SATURATED O2: 55 % (ref 95–100)
POC SATURATED O2: 57 % (ref 95–100)
POC SATURATED O2: 63 % (ref 95–100)
POC TCO2: 31 MMOL/L (ref 24–29)
POC TCO2: 32 MMOL/L (ref 24–29)
POC TCO2: 35 MMOL/L (ref 24–29)
POTASSIUM SERPL-SCNC: 4 MMOL/L (ref 3.5–5.1)
POTASSIUM SERPL-SCNC: 4.8 MMOL/L (ref 3.5–5.1)
PROTHROMBIN TIME: 20.9 SEC (ref 9–12.5)
RBC # BLD AUTO: 3.95 M/UL (ref 4–5.4)
SAMPLE: ABNORMAL
SITE: ABNORMAL
SODIUM SERPL-SCNC: 138 MMOL/L (ref 136–145)
SODIUM SERPL-SCNC: 139 MMOL/L (ref 136–145)
WBC # BLD AUTO: 7.97 K/UL (ref 3.9–12.7)

## 2019-12-14 PROCEDURE — 84100 ASSAY OF PHOSPHORUS: CPT | Mod: NTX

## 2019-12-14 PROCEDURE — 85025 COMPLETE CBC W/AUTO DIFF WBC: CPT | Mod: NTX

## 2019-12-14 PROCEDURE — 83735 ASSAY OF MAGNESIUM: CPT | Mod: NTX

## 2019-12-14 PROCEDURE — 83615 LACTATE (LD) (LDH) ENZYME: CPT | Mod: NTX

## 2019-12-14 PROCEDURE — 99900035 HC TECH TIME PER 15 MIN (STAT): Mod: NTX

## 2019-12-14 PROCEDURE — 20000000 HC ICU ROOM: Mod: NTX

## 2019-12-14 PROCEDURE — 99291 PR CRITICAL CARE, E/M 30-74 MINUTES: ICD-10-PCS | Mod: NTX,,, | Performed by: PHYSICIAN ASSISTANT

## 2019-12-14 PROCEDURE — 96376 TX/PRO/DX INJ SAME DRUG ADON: CPT

## 2019-12-14 PROCEDURE — 25000003 PHARM REV CODE 250: Mod: NTX | Performed by: INTERNAL MEDICINE

## 2019-12-14 PROCEDURE — 96375 TX/PRO/DX INJ NEW DRUG ADDON: CPT

## 2019-12-14 PROCEDURE — 82803 BLOOD GASES ANY COMBINATION: CPT | Mod: NTX

## 2019-12-14 PROCEDURE — 27000248 HC VAD-ADDITIONAL DAY: Mod: NTX

## 2019-12-14 PROCEDURE — 80048 BASIC METABOLIC PNL TOTAL CA: CPT | Mod: NTX

## 2019-12-14 PROCEDURE — 25000003 PHARM REV CODE 250: Mod: NTX | Performed by: PHYSICIAN ASSISTANT

## 2019-12-14 PROCEDURE — 63600367 HC NITRIC OXIDE PER HOUR: Mod: NTX

## 2019-12-14 PROCEDURE — 96366 THER/PROPH/DIAG IV INF ADDON: CPT

## 2019-12-14 PROCEDURE — 83050 HGB METHEMOGLOBIN QUAN: CPT | Mod: NTX

## 2019-12-14 PROCEDURE — 63600175 PHARM REV CODE 636 W HCPCS: Mod: NTX | Performed by: PHYSICIAN ASSISTANT

## 2019-12-14 PROCEDURE — 63600175 PHARM REV CODE 636 W HCPCS: Mod: NTX | Performed by: INTERNAL MEDICINE

## 2019-12-14 PROCEDURE — 27000221 HC OXYGEN, UP TO 24 HOURS: Mod: NTX

## 2019-12-14 PROCEDURE — 25000003 PHARM REV CODE 250: Mod: NTX | Performed by: NURSE PRACTITIONER

## 2019-12-14 PROCEDURE — 94761 N-INVAS EAR/PLS OXIMETRY MLT: CPT | Mod: NTX

## 2019-12-14 PROCEDURE — 80048 BASIC METABOLIC PNL TOTAL CA: CPT | Mod: 91,NTX

## 2019-12-14 PROCEDURE — 63600175 PHARM REV CODE 636 W HCPCS: Mod: NTX | Performed by: STUDENT IN AN ORGANIZED HEALTH CARE EDUCATION/TRAINING PROGRAM

## 2019-12-14 PROCEDURE — 85730 THROMBOPLASTIN TIME PARTIAL: CPT | Mod: NTX

## 2019-12-14 PROCEDURE — 99291 CRITICAL CARE FIRST HOUR: CPT | Mod: NTX,,, | Performed by: PHYSICIAN ASSISTANT

## 2019-12-14 PROCEDURE — 85610 PROTHROMBIN TIME: CPT | Mod: NTX

## 2019-12-14 RX ORDER — MEXILETINE HYDROCHLORIDE 150 MG/1
150 CAPSULE ORAL EVERY 8 HOURS
Status: DISCONTINUED | OUTPATIENT
Start: 2019-12-14 | End: 2019-12-16

## 2019-12-14 RX ADMIN — HYDRALAZINE HYDROCHLORIDE 50 MG: 50 TABLET ORAL at 08:12

## 2019-12-14 RX ADMIN — MEXILETINE HYDROCHLORIDE 200 MG: 200 CAPSULE ORAL at 01:12

## 2019-12-14 RX ADMIN — AMIODARONE HYDROCHLORIDE 400 MG: 200 TABLET ORAL at 10:12

## 2019-12-14 RX ADMIN — FAMOTIDINE 20 MG: 20 TABLET ORAL at 08:12

## 2019-12-14 RX ADMIN — POLYETHYLENE GLYCOL 3350 17 G: 17 POWDER, FOR SOLUTION ORAL at 10:12

## 2019-12-14 RX ADMIN — AMIODARONE HYDROCHLORIDE 400 MG: 200 TABLET ORAL at 08:12

## 2019-12-14 RX ADMIN — GABAPENTIN 200 MG: 100 CAPSULE ORAL at 08:12

## 2019-12-14 RX ADMIN — MIRTAZAPINE 15 MG: 15 TABLET, FILM COATED ORAL at 08:12

## 2019-12-14 RX ADMIN — FUROSEMIDE 80 MG: 10 INJECTION, SOLUTION INTRAMUSCULAR; INTRAVENOUS at 02:12

## 2019-12-14 RX ADMIN — MEXILETINE HYDROCHLORIDE 150 MG: 150 CAPSULE ORAL at 08:12

## 2019-12-14 RX ADMIN — PANTOPRAZOLE SODIUM 40 MG: 40 TABLET, DELAYED RELEASE ORAL at 05:12

## 2019-12-14 RX ADMIN — FUROSEMIDE 80 MG: 10 INJECTION, SOLUTION INTRAMUSCULAR; INTRAVENOUS at 08:12

## 2019-12-14 RX ADMIN — SENNOSIDES AND DOCUSATE SODIUM 1 TABLET: 8.6; 5 TABLET ORAL at 10:12

## 2019-12-14 RX ADMIN — PANTOPRAZOLE SODIUM 40 MG: 40 TABLET, DELAYED RELEASE ORAL at 06:12

## 2019-12-14 RX ADMIN — ZOLPIDEM TARTRATE 5 MG: 5 TABLET ORAL at 11:12

## 2019-12-14 RX ADMIN — MAGNESIUM OXIDE TAB 400 MG (241.3 MG ELEMENTAL MG) 400 MG: 400 (241.3 MG) TAB at 10:12

## 2019-12-14 RX ADMIN — SUCRALFATE 1 G: 1 SUSPENSION ORAL at 02:12

## 2019-12-14 RX ADMIN — POTASSIUM CHLORIDE 40 MEQ: 1500 TABLET, EXTENDED RELEASE ORAL at 08:12

## 2019-12-14 RX ADMIN — FUROSEMIDE 80 MG: 10 INJECTION, SOLUTION INTRAMUSCULAR; INTRAVENOUS at 10:12

## 2019-12-14 RX ADMIN — CHLOROTHIAZIDE SODIUM 250 MG: 500 INJECTION, POWDER, LYOPHILIZED, FOR SOLUTION INTRAVENOUS at 08:12

## 2019-12-14 RX ADMIN — SUCRALFATE 1 G: 1 SUSPENSION ORAL at 06:12

## 2019-12-14 RX ADMIN — HYDRALAZINE HYDROCHLORIDE 50 MG: 50 TABLET ORAL at 01:12

## 2019-12-14 RX ADMIN — MEXILETINE HYDROCHLORIDE 200 MG: 200 CAPSULE ORAL at 06:12

## 2019-12-14 RX ADMIN — GABAPENTIN 200 MG: 100 CAPSULE ORAL at 10:12

## 2019-12-14 RX ADMIN — SENNOSIDES AND DOCUSATE SODIUM 1 TABLET: 8.6; 5 TABLET ORAL at 08:12

## 2019-12-14 RX ADMIN — VENLAFAXINE 150 MG: 37.5 TABLET ORAL at 10:12

## 2019-12-14 RX ADMIN — HYDRALAZINE HYDROCHLORIDE 50 MG: 50 TABLET ORAL at 06:12

## 2019-12-14 RX ADMIN — ASPIRIN 325 MG: 325 TABLET, DELAYED RELEASE ORAL at 10:12

## 2019-12-14 RX ADMIN — EPINEPHRINE 0.03 MCG/KG/MIN: 1 INJECTION PARENTERAL at 03:12

## 2019-12-14 NOTE — PROGRESS NOTES
12/14/2019  Terra Porter    Current provider:  Jolene Lua MD      I, Terra Porter, rounded on Deborah Oliva Navas to ensure all mechanical assist device settings (IABP or VAD) were appropriate and all parameters were within limits.  I was able to ensure all back up equipment was present, the staff had no issues, and the Perfusion Department daily rounding was complete.    12:10 PM

## 2019-12-14 NOTE — SUBJECTIVE & OBJECTIVE
Interval History: speed increased yesterday after echo reviewed, CVP remains 11    Continuous Infusions:   epinephrine 0.03 mcg/kg/min (12/14/19 0600)    nitric oxide gas       Scheduled Meds:   amiodarone  400 mg Oral BID    aspirin  325 mg Oral Daily    famotidine  20 mg Oral QHS    furosemide  80 mg Intravenous TID    gabapentin  200 mg Oral BID    hydrALAZINE  50 mg Oral Q8H    magnesium oxide  400 mg Oral Daily    mexiletine  200 mg Oral Q8H    mirtazapine  15 mg Oral QHS    pantoprazole  40 mg Oral BID AC    polyethylene glycol  17 g Oral Daily    potassium chloride  40 mEq Oral BID    senna-docusate 8.6-50 mg  1 tablet Oral BID    sucralfate  1 g Oral QID (AC & HS)    venlafaxine  150 mg Oral Daily     PRN Meds:ALPRAZolam, ondansetron, pneumoc 13-amber conj-dip cr(PF), promethazine, sodium chloride, white petrolatum, zolpidem    Review of patient's allergies indicates:   Allergen Reactions    Adhesive Blisters     Reaction to area in chest and up only    Codeine Itching     Objective:     Vital Signs (Most Recent):  Temp: 98.2 °F (36.8 °C) (12/14/19 0400)  Pulse: 83 (12/14/19 0600)  Resp: 18 (12/14/19 0600)  BP: (!) 78/0 (12/14/19 0633)  SpO2: 99 % (12/14/19 0400) Vital Signs (24h Range):  Temp:  [97.7 °F (36.5 °C)-98.3 °F (36.8 °C)] 98.2 °F (36.8 °C)  Pulse:  [] 83  Resp:  [18-60] 18  SpO2:  [98 %-100 %] 99 %  BP: ()/(0-79) 78/0     Patient Vitals for the past 72 hrs (Last 3 readings):   Weight   12/14/19 0300 103.2 kg (227 lb 8.2 oz)   12/13/19 1000 101 kg (222 lb 10.6 oz)   12/13/19 0300 95.5 kg (210 lb 8.6 oz)     Body mass index is 36.72 kg/m².      Intake/Output Summary (Last 24 hours) at 12/14/2019 0735  Last data filed at 12/14/2019 0600  Gross per 24 hour   Intake 1276.2 ml   Output 2850 ml   Net -1573.8 ml       Hemodynamic Parameters:           Physical Exam   Constitutional: She is oriented to person, place, and time. She appears well-developed and well-nourished.    HENT:   Mouth/Throat: Oropharynx is clear and moist.   Eyes: EOM are normal.   Neck: JVD present.   Cardiovascular: Normal rate, regular rhythm and intact distal pulses.   Smooth VAD hum   Pulmonary/Chest: Effort normal and breath sounds normal. No respiratory distress. She has no rales.   Abdominal: Soft. Bowel sounds are normal. She exhibits no distension. There is no tenderness. There is no guarding.   Musculoskeletal: She exhibits no edema.   Neurological: She is alert and oriented to person, place, and time.   Skin: Skin is warm.   Psychiatric: She has a normal mood and affect.   Nursing note and vitals reviewed.      Significant Labs:  CBC:  Recent Labs   Lab 12/12/19  0212 12/13/19  0400 12/14/19  0403   WBC 7.37 7.06 7.97   RBC 4.07 3.97* 3.95*   HGB 9.9* 9.7* 9.6*   HCT 34.7* 34.1* 34.0*    311 309   MCV 85 86 86   MCH 24.3* 24.4* 24.3*   MCHC 28.5* 28.4* 28.2*     BNP:  Recent Labs   Lab 12/09/19  0333 12/11/19  0500 12/13/19  0400   * 369* 503*     CMP:  Recent Labs   Lab 12/09/19  0333  12/11/19  0500 12/12/19  0212 12/13/19  0400 12/14/19  0403   *   < > 126* 131* 120* 147*   CALCIUM 9.1   < > 9.1 9.3 9.4 9.4   ALBUMIN 3.4*  --  3.4*  --  3.5  --    PROT 6.8  --  6.9  --  7.1  --       < > 140 137 139 138   K 4.0   < > 4.1 4.5 4.1 4.8   CO2 28   < > 28 28 30* 29   CL 99   < > 100 97 97 97   BUN 22*   < > 23* 24* 23* 23*   CREATININE 1.6*   < > 1.8* 2.1* 2.1* 2.1*   ALKPHOS 89  --  87  --  89  --    ALT 16  --  16  --  16  --    AST 21  --  20  --  18  --    BILITOT 0.3  --  0.4  --  0.4  --     < > = values in this interval not displayed.      Coagulation:   Recent Labs   Lab 12/12/19  0212 12/13/19  0400 12/14/19  0403   INR 3.2* 2.5* 2.2*   APTT 39.6* 35.7* 35.6*     LDH:  Recent Labs   Lab 12/12/19  0212 12/13/19  0400 12/14/19  0403   * 248 257     Microbiology:  Microbiology Results (last 7 days)     ** No results found for the last 168 hours. **          I have  reviewed all pertinent labs within the past 24 hours.    Estimated Creatinine Clearance: 38.9 mL/min (A) (based on SCr of 2.1 mg/dL (H)).    Diagnostic Results:  I have reviewed and interpreted all pertinent imaging results/findings within the past 24 hours.

## 2019-12-14 NOTE — PROGRESS NOTES
[facility]  Heart Transplant  Progress Note    Patient Name: Deborah Navas  MRN: [id number]  Admission Date: 11/13/2019  Hospital Length of Stay: 29 days  Attending Physician: Jolene Lua MD  Primary Care Provider: Primary Doctor No  Principal Problem:Kidney transplant candidate    Subjective:     Interval History: speed increased yesterday after echo reviewed, CVP remains 11    Continuous Infusions:   epinephrine 0.03 mcg/kg/min (12/14/19 0600)    nitric oxide gas       Scheduled Meds:   amiodarone  400 mg Oral BID    aspirin  325 mg Oral Daily    famotidine  20 mg Oral QHS    furosemide  80 mg Intravenous TID    gabapentin  200 mg Oral BID    hydrALAZINE  50 mg Oral Q8H    magnesium oxide  400 mg Oral Daily    mexiletine  200 mg Oral Q8H    mirtazapine  15 mg Oral QHS    pantoprazole  40 mg Oral BID AC    polyethylene glycol  17 g Oral Daily    potassium chloride  40 mEq Oral BID    senna-docusate 8.6-50 mg  1 tablet Oral BID    sucralfate  1 g Oral QID (AC & HS)    venlafaxine  150 mg Oral Daily     PRN Meds:ALPRAZolam, ondansetron, pneumoc 13-amber conj-dip cr(PF), promethazine, sodium chloride, white petrolatum, zolpidem    Review of patient's allergies indicates:   Allergen Reactions    Adhesive Blisters     Reaction to area in chest and up only    Codeine Itching     Objective:     Vital Signs (Most Recent):  Temp: 98.2 °F (36.8 °C) (12/14/19 0400)  Pulse: 83 (12/14/19 0600)  Resp: 18 (12/14/19 0600)  BP: (!) 78/0 (12/14/19 0633)  SpO2: 99 % (12/14/19 0400) Vital Signs (24h Range):  Temp:  [97.7 °F (36.5 °C)-98.3 °F (36.8 °C)] 98.2 °F (36.8 °C)  Pulse:  [] 83  Resp:  [18-60] 18  SpO2:  [98 %-100 %] 99 %  BP: ()/(0-79) 78/0     Patient Vitals for the past 72 hrs (Last 3 readings):   Weight   12/14/19 0300 103.2 kg (227 lb 8.2 oz)   12/13/19 1000 101 kg (222 lb 10.6 oz)   12/13/19 0300 95.5 kg (210 lb 8.6 oz)     Body mass index is 36.72  kg/m².      Intake/Output Summary (Last 24 hours) at 12/14/2019 0735  Last data filed at 12/14/2019 0600  Gross per 24 hour   Intake 1276.2 ml   Output 2850 ml   Net -1573.8 ml       Hemodynamic Parameters:           Physical Exam   Constitutional: She is oriented to person, place, and time. She appears well-developed and well-nourished.   HENT:   Mouth/Throat: Oropharynx is clear and moist.   Eyes: EOM are normal.   Neck: JVD present.   Cardiovascular: Normal rate, regular rhythm and intact distal pulses.   Smooth VAD hum   Pulmonary/Chest: Effort normal and breath sounds normal. No respiratory distress. She has no rales.   Abdominal: Soft. Bowel sounds are normal. She exhibits no distension. There is no tenderness. There is no guarding.   Musculoskeletal: She exhibits no edema.   Neurological: She is alert and oriented to person, place, and time.   Skin: Skin is warm.   Psychiatric: She has a normal mood and affect.   Nursing note and vitals reviewed.      Significant Labs:  CBC:  Recent Labs   Lab 12/12/19  0212 12/13/19  0400 12/14/19  0403   WBC 7.37 7.06 7.97   RBC 4.07 3.97* 3.95*   HGB 9.9* 9.7* 9.6*   HCT 34.7* 34.1* 34.0*    311 309   MCV 85 86 86   MCH 24.3* 24.4* 24.3*   MCHC 28.5* 28.4* 28.2*     BNP:  Recent Labs   Lab 12/09/19  0333 12/11/19  0500 12/13/19  0400   * 369* 503*     CMP:  Recent Labs   Lab 12/09/19  0333  12/11/19  0500 12/12/19  0212 12/13/19  0400 12/14/19  0403   *   < > 126* 131* 120* 147*   CALCIUM 9.1   < > 9.1 9.3 9.4 9.4   ALBUMIN 3.4*  --  3.4*  --  3.5  --    PROT 6.8  --  6.9  --  7.1  --       < > 140 137 139 138   K 4.0   < > 4.1 4.5 4.1 4.8   CO2 28   < > 28 28 30* 29   CL 99   < > 100 97 97 97   BUN 22*   < > 23* 24* 23* 23*   CREATININE 1.6*   < > 1.8* 2.1* 2.1* 2.1*   ALKPHOS 89  --  87  --  89  --    ALT 16  --  16  --  16  --    AST 21  --  20  --  18  --    BILITOT 0.3  --  0.4  --  0.4  --     < > = values in this interval not displayed.       Coagulation:   Recent Labs   Lab 12/12/19  0212 12/13/19  0400 12/14/19  0403   INR 3.2* 2.5* 2.2*   APTT 39.6* 35.7* 35.6*     LDH:  Recent Labs   Lab 12/12/19  0212 12/13/19  0400 12/14/19  0403   * 248 257     Microbiology:  Microbiology Results (last 7 days)     ** No results found for the last 168 hours. **          I have reviewed all pertinent labs within the past 24 hours.    Estimated Creatinine Clearance: 38.9 mL/min (A) (based on SCr of 2.1 mg/dL (H)).    Diagnostic Results:  I have reviewed and interpreted all pertinent imaging results/findings within the past 24 hours.    Assessment and Plan:     50 yo WF with NICM, s/p HM3 implantation 9/10/19 (post up coarse uncomplicated with the exception of+ diaphragmatic stimulation of LV lead and pleural effusion with chest tube placement, atrial flutter with NADINE/DCCV), V-fib reported in setting of hypokalemia with appropriate shock from ICD on Amiodarone prior to LVAD placement; and recent hospitalizations for ventricular arrythmias; started on Mexilitine.  She was seen in EP clinic today and she continues to have multiple VT episodes with some ATP therapy, although no ICD shocks since starting mexiletine 10/24/2019. One slow VT episode 2.5hrs 11/3/2019. Patient asymptomatic with LVAD. On coumadin. No AFL since post-op event (paced out of rhythm 9/24/2019). CHB with 100% V pacing (LV lead off). She does not feel well. Dry heaving with mexiletine. Taking phenergan PRN. 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   She was seen in HTS clinic and reported 4 low flow alarms the last 4 nights.  She reports they occur in her sleep and last for only a few seconds.  By the time she wakes up; they quickly stop.  She does report to possibly being little volume overloaded.  She hasn't noticed weight gain at home but thinks she may have little extra fluid.  She denies SOB, chest pain, palpitations, LEGER. In review of her records:  she was 223lbs on 10/24/19 during previous hospitalization and is 235lbs now.  Her lasix had previously been decreased to prn.     Nausea  - has intermittent nausea/vomiting, likely multifactorial with RVF and mexiletine  - on BID PPI as well as sucralfate, and H2 blocker   - judicious use of zofran and phenergan due to hx of VT storm    GLO (acute kidney injury)  - see CKD    Organ transplant candidate  - will list with exemption due to VT with ICD shocks and RV failure (will not tolerate ) and does not tolerate NO wean  - approved for transplant on 11/26. Tier 2.     Left ventricular assist device (LVAD) complication  - hx low flow alarms prior to admit, last LFA 12/1  - Possibly secondary to ADHF--> RV failure  - Formal report of CT unremarkable; however, it was a non contrast as creat was elevated above baseline on admit and inflow and outflow cannulas not accurately visualized    Essential hypertension  - Patient is non-pulsatile  - Doppler goal 60-90 mmHg, currently borderline high  - Antihypertensive medications include: hydralazine 25 Q8H      Ventricular tachycardia  - Per EP office visit on day of admission: She continues to have multiple VT episodes with some ATP therapy, although no ICD shocks since starting mexiletine 10/24/2019. One slow VT episode 2.5hrs 11/3/2019. Patient asymptomatic with LVAD. On coumadin. No AFL since post-op event (paced out of rhythm 9/24/2019). CHB with 100% V pacing (LV lead off).   - Plan was to continue current regimen despite Mexiletine making her nauseous.  - Decreased Amiodarone to daily per EP plan on discharge last hospitalization   - Shocked x 3 11/25 for MMVT (in addition has had numerous successful ATP's for MMVT)   - S/P amio load   - now on Amio 400 BID and mexiletine increased to 200  - ICD interrogation 12/10 due to palpitations per Dr Lua- no events noted    LVAD (left ventricular assist device) present  - HeartMate 3 Implanted 9/10/19 as DT.  - Implanted  by Dr. Walden  - INR theraputic.   - per Dr Lua will d/c coumadin, let drift down and bridge with heparin in preparation for OHTx  - Antiplatelets:  mg.  - LDH is stable Will monitor daily.   - Speed set at 5100  - Epi @ .03, NO @ 20.   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 Urgent selection 11/26, approved for transplant. Listed tier 2.    Procedure: Device Interrogation Including analysis of device parameters  Current Settings: Ventricular Assist Device  Review of device function is stable    TXP LVAD INTERROGATIONS 12/14/2019 12/14/2019 12/14/2019 12/14/2019 12/14/2019 12/14/2019 12/13/2019   Type HeartMate3 HeartMate3 HeartMate3 HeartMate3 HeartMate3 HeartMate3 HeartMate3   Flow 3.9 3.7 3.6 3.2 3.9 3.6 3.5   Speed 5200 5200 5250 5200 5200 5200 5200   PI 4.3 4.4 5.5 6.6 4.9 5.5 5.7   Power ([place]) 3.6 3.6 3.6 3.6 3.6 3.5 3.6   LSL 4700 4700 4700 4700 4700 4700 4700   Pulsatility Intermittent pulse Intermittent pulse Intermittent pulse Intermittent pulse Intermittent pulse Intermittent pulse Intermittent pulse       CKD (chronic kidney disease)  - Baseline creat appears 1.3-1.6.   - Will monitor with diuresis   - KTM consult for GFR evaluation due to Cr remaining higher than baseline and fluctuating.     Acute on chronic combined systolic and diastolic heart failure  - NICM  - Last 2D Echo 12/4/19. AV does not open, LVedd 5.5cm @ 5100.   - IV push lasix today 80 TID   - EPI .02, NO @ 20   - GDMT: hydralazine for BP control   - was on lasix 20 PRN at home PTA  -2g Na dietary restriction, 1500 mL fluid restriction, strict I/Os      ICD (implantable cardioverter-defibrillator) in place  - [employer]onic ICD  - Shocked x 3 11/25 for MMVT   - Loaded with IV amio on 11/25. Now on amio 400 PO BID and mexiletine increased to 200 QD   - See above VT    Uninterrupted Critical Care/Counseling Time (not including procedures): 30 minutes      ZAC [place]  Heart Transplant  [facility]  [place]

## 2019-12-14 NOTE — PLAN OF CARE
CMICU DAILY GOALS       A: Awake    RASS: Goal - RASS Goal: 0-->alert and calm  Actual - RASS (Patel Agitation-Sedation Scale): 0-->alert and calm   Restraint necessity:    B: Breath   SBT: Not intubated   C: Coordinate A & B, analgesics/sedatives   Pain: managed    SAT: Not intubated  D: Delirium   CAM-ICU: Overall CAM-ICU: Negative  E: Early Mobility   MOVE Screen: Pass   Activity: Activity Management: activity adjusted per tolerance, activity clustered for rest period  FAS: Feeding/Nutrition   Diet order: Diet/Nutrition Received: low saturated fat/low cholesterol,   Fluid restriction: Fluid Requirement: 2 L FR  T: Thrombus   DVT prophylaxis: VTE Required Core Measure: (SCDs) Sequential compression device initiated/maintained, Pharmacological prophylaxis initiated/maintained  H: HOB Elevation   Head of Bed (HOB): HOB at 20-30 degrees  U: Ulcer Prophylaxis   GI: yes  G: Glucose control   managed    S: Skin   Bundle compliance: yes   Bathing/Skin Care: bath, chlorhexidine, bath, complete, dressed/undressed, linen changed Date: [unfilled] 12/14/19    B: Bowel Function   no issues   I: Indwelling Catheters   Fonseca necessity:     CVC necessity: Yes   IPAD offered: No  D: De-escalation Antibx   Yes  Plan for the day   Optimize fluid balance. Monitor vitals, labs, cvp, dopplers and pt/inr.  Family/Goals of care/Code Status   Code Status: Full Code     No acute events throughout day, VS and assessment per flow sheet, patient progressing towards goals as tolerated, plan of care reviewed with Deborah Navas and family, all concerns addressed, will continue to monitor.

## 2019-12-14 NOTE — ASSESSMENT & PLAN NOTE
- HeartMate 3 Implanted 9/10/19 as DT.  - Implanted by Dr. Walden  - INR theraputic.   - per Dr Lua will d/c coumadin, let drift down and bridge with heparin in preparation for OHTx  - Antiplatelets:  mg.  - LDH is stable Will monitor daily.   - Speed set at 5100  - Epi @ .03, NO @ 20.   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 Urgent selection 11/26, approved for transplant. Listed tier 2.    Procedure: Device Interrogation Including analysis of device parameters  Current Settings: Ventricular Assist Device  Review of device function is stable    TXP LVAD INTERROGATIONS 12/14/2019 12/14/2019 12/14/2019 12/14/2019 12/14/2019 12/14/2019 12/13/2019   Type HeartMate3 HeartMate3 HeartMate3 HeartMate3 HeartMate3 HeartMate3 HeartMate3   Flow 3.9 3.7 3.6 3.2 3.9 3.6 3.5   Speed 5200 5200 5250 5200 5200 5200 5200   PI 4.3 4.4 5.5 6.6 4.9 5.5 5.7   Power ([place]) 3.6 3.6 3.6 3.6 3.6 3.5 3.6   LSL 4700 4700 4700 4700 4700 4700 4700   Pulsatility Intermittent pulse Intermittent pulse Intermittent pulse Intermittent pulse Intermittent pulse Intermittent pulse Intermittent pulse

## 2019-12-14 NOTE — PLAN OF CARE
HTS Update    - Earlier LVAD speed increased to 5600 and epi decreased to 0.02. SvO2 went from 55 to 63. CVP 14.  - Will decrease epi at 0.01. Check SvO2 in 2 hours.  - Add dose of diuril 250 mg with evening dose of furosemide 80 mg IV. Check BMP now.    Discussed with Dr. Stoney Valdes MD  Cardiology Fellow, PGY4

## 2019-12-14 NOTE — CONSULTS
Arrhythmia Update:  Called due to patient having persistent nausea with her current dosing of Mexiletine (200mg TID) in conjunction with Amiodarone 400mg BID for recurrent ventricular arrhythmias however, medical management will not fully suppress and even more so cure her. She has had frequent hospitalizations as a result despite dual medical and device management. Now that she is having adverse reactions such has persistent nausea this will lead to further complications down the line in terms or treatment. Overall, heart txp will be the best treatment for her and we are aware of her being listed currently.      Recommendations:  - for now suggest decreasing Mexiletine to 150mg BID and continue Amiodarone at 400mg BID       Please call with any further questions or concerns.         Christa Chen MD  Cardiology Fellow  PGY 4  [facility] - #[phone]

## 2019-12-14 NOTE — NURSING
Dr. Nuñez at bedside with Dr. Lua. VAD pump speed increased to 5800 rpm from 5200. Flows remain mid 4s with PI mid 3s. Pt tolerating well at this time. Epi remains at 0.02 mcg/kg/min. Will recheck SvO2 at 3pm. WCTM

## 2019-12-15 ENCOUNTER — TELEPHONE (OUTPATIENT)
Dept: TRANSPLANT | Facility: CLINIC | Age: 49
End: 2019-12-15

## 2019-12-15 ENCOUNTER — ANESTHESIA EVENT (OUTPATIENT)
Dept: SURGERY | Facility: HOSPITAL | Age: 49
DRG: 001 | End: 2019-12-15
Payer: COMMERCIAL

## 2019-12-15 ENCOUNTER — ANESTHESIA (OUTPATIENT)
Dept: SURGERY | Facility: HOSPITAL | Age: 49
DRG: 001 | End: 2019-12-15
Payer: COMMERCIAL

## 2019-12-15 LAB
ABO + RH BLD: NORMAL
ALBUMIN SERPL BCP-MCNC: 3.4 G/DL (ref 3.5–5.2)
ALLENS TEST: ABNORMAL
ALP SERPL-CCNC: 90 U/L (ref 55–135)
ALT SERPL W/O P-5'-P-CCNC: 15 U/L (ref 10–44)
ANION GAP SERPL CALC-SCNC: 11 MMOL/L (ref 8–16)
ANION GAP SERPL CALC-SCNC: 12 MMOL/L (ref 8–16)
ANION GAP SERPL CALC-SCNC: 12 MMOL/L (ref 8–16)
APTT BLDCRRT: 31.2 SEC (ref 21–32)
APTT BLDCRRT: 31.2 SEC (ref 21–32)
ASCENDING AORTA: 2.94 CM
AST SERPL-CCNC: 16 U/L (ref 10–40)
BASOPHILS # BLD AUTO: 0.03 K/UL (ref 0–0.2)
BASOPHILS # BLD AUTO: 0.03 K/UL (ref 0–0.2)
BASOPHILS NFR BLD: 0.5 % (ref 0–1.9)
BASOPHILS NFR BLD: 0.5 % (ref 0–1.9)
BILIRUB SERPL-MCNC: 0.4 MG/DL (ref 0.1–1)
BILIRUB UR QL STRIP: NEGATIVE
BLD GP AB SCN CELLS X3 SERPL QL: NORMAL
BSA FOR ECHO PROCEDURE: 2.17 M2
BUN SERPL-MCNC: 23 MG/DL (ref 6–20)
BUN SERPL-MCNC: 23 MG/DL (ref 6–20)
BUN SERPL-MCNC: 24 MG/DL (ref 6–20)
CALCIUM SERPL-MCNC: 9.5 MG/DL (ref 8.7–10.5)
CALCIUM SERPL-MCNC: 9.5 MG/DL (ref 8.7–10.5)
CALCIUM SERPL-MCNC: 9.9 MG/DL (ref 8.7–10.5)
CHLORIDE SERPL-SCNC: 93 MMOL/L (ref 95–110)
CHLORIDE SERPL-SCNC: 93 MMOL/L (ref 95–110)
CHLORIDE SERPL-SCNC: 97 MMOL/L (ref 95–110)
CLARITY UR REFRACT.AUTO: CLEAR
CO2 SERPL-SCNC: 31 MMOL/L (ref 23–29)
CO2 SERPL-SCNC: 33 MMOL/L (ref 23–29)
CO2 SERPL-SCNC: 33 MMOL/L (ref 23–29)
COLOR UR AUTO: NORMAL
CREAT SERPL-MCNC: 1.9 MG/DL (ref 0.5–1.4)
CREAT SERPL-MCNC: 1.9 MG/DL (ref 0.5–1.4)
CREAT SERPL-MCNC: 2.3 MG/DL (ref 0.5–1.4)
CV ECHO LV RWT: 0.35 CM
DIFFERENTIAL METHOD: ABNORMAL
DIFFERENTIAL METHOD: ABNORMAL
DOP CALC LVOT AREA: 2.7 CM2
DOP CALC LVOT DIAMETER: 1.87 CM
E WAVE DECELERATION TIME: 102.16 MSEC
E/A RATIO: 1.47
E/E' RATIO: 7.91 M/S
ECHO LV POSTERIOR WALL: 0.92 CM (ref 0.6–1.1)
EOSINOPHIL # BLD AUTO: 0.2 K/UL (ref 0–0.5)
EOSINOPHIL # BLD AUTO: 0.2 K/UL (ref 0–0.5)
EOSINOPHIL NFR BLD: 2.8 % (ref 0–8)
EOSINOPHIL NFR BLD: 2.8 % (ref 0–8)
ERYTHROCYTE [DISTWIDTH] IN BLOOD BY AUTOMATED COUNT: 17.3 % (ref 11.5–14.5)
ERYTHROCYTE [DISTWIDTH] IN BLOOD BY AUTOMATED COUNT: 17.3 % (ref 11.5–14.5)
EST. GFR  (AFRICAN AMERICAN): 27.7 ML/MIN/1.73 M^2
EST. GFR  (AFRICAN AMERICAN): 34.9 ML/MIN/1.73 M^2
EST. GFR  (AFRICAN AMERICAN): 34.9 ML/MIN/1.73 M^2
EST. GFR  (NON AFRICAN AMERICAN): 24.1 ML/MIN/1.73 M^2
EST. GFR  (NON AFRICAN AMERICAN): 30.3 ML/MIN/1.73 M^2
EST. GFR  (NON AFRICAN AMERICAN): 30.3 ML/MIN/1.73 M^2
FRACTIONAL SHORTENING: 23 % (ref 28–44)
GLUCOSE SERPL-MCNC: 142 MG/DL (ref 70–110)
GLUCOSE SERPL-MCNC: 95 MG/DL (ref 70–110)
GLUCOSE SERPL-MCNC: 95 MG/DL (ref 70–110)
GLUCOSE UR QL STRIP: NEGATIVE
HCO3 UR-SCNC: 35.7 MMOL/L (ref 24–28)
HCT VFR BLD AUTO: 32.6 % (ref 37–48.5)
HCT VFR BLD AUTO: 32.6 % (ref 37–48.5)
HGB BLD-MCNC: 9.5 G/DL (ref 12–16)
HGB BLD-MCNC: 9.5 G/DL (ref 12–16)
HGB UR QL STRIP: NEGATIVE
IMM GRANULOCYTES # BLD AUTO: 0.02 K/UL (ref 0–0.04)
IMM GRANULOCYTES # BLD AUTO: 0.02 K/UL (ref 0–0.04)
IMM GRANULOCYTES NFR BLD AUTO: 0.3 % (ref 0–0.5)
IMM GRANULOCYTES NFR BLD AUTO: 0.3 % (ref 0–0.5)
INR PPP: 1.7 (ref 0.8–1.2)
INR PPP: 1.7 (ref 0.8–1.2)
INTERVENTRICULAR SEPTUM: 0.74 CM (ref 0.6–1.1)
KETONES UR QL STRIP: NEGATIVE
LA MAJOR: 5.62 CM
LA MINOR: 5.55 CM
LA WIDTH: 4.02 CM
LDH SERPL L TO P-CCNC: 246 U/L (ref 110–260)
LEFT ATRIUM SIZE: 3.2 CM
LEFT ATRIUM VOLUME INDEX: 29.2 ML/M2
LEFT ATRIUM VOLUME: 61.07 CM3
LEFT INTERNAL DIMENSION IN SYSTOLE: 4 CM (ref 2.1–4)
LEFT VENTRICLE DIASTOLIC VOLUME INDEX: 51.71 ML/M2
LEFT VENTRICLE DIASTOLIC VOLUME: 108.33 ML
LEFT VENTRICLE MASS INDEX: 73 G/M2
LEFT VENTRICLE SYSTOLIC VOLUME INDEX: 29.9 ML/M2
LEFT VENTRICLE SYSTOLIC VOLUME: 62.54 ML
LEFT VENTRICULAR INTERNAL DIMENSION IN DIASTOLE: 5.2 CM (ref 3.5–6)
LEFT VENTRICULAR MASS: 152.21 G
LEUKOCYTE ESTERASE UR QL STRIP: NEGATIVE
LV LATERAL E/E' RATIO: 7.25 M/S
LV SEPTAL E/E' RATIO: 8.7 M/S
LYMPHOCYTES # BLD AUTO: 1.1 K/UL (ref 1–4.8)
LYMPHOCYTES # BLD AUTO: 1.1 K/UL (ref 1–4.8)
LYMPHOCYTES NFR BLD: 19.8 % (ref 18–48)
LYMPHOCYTES NFR BLD: 19.8 % (ref 18–48)
MAGNESIUM SERPL-MCNC: 2.2 MG/DL (ref 1.6–2.6)
MCH RBC QN AUTO: 24.4 PG (ref 27–31)
MCH RBC QN AUTO: 24.4 PG (ref 27–31)
MCHC RBC AUTO-ENTMCNC: 29.1 G/DL (ref 32–36)
MCHC RBC AUTO-ENTMCNC: 29.1 G/DL (ref 32–36)
MCV RBC AUTO: 84 FL (ref 82–98)
MCV RBC AUTO: 84 FL (ref 82–98)
METHEMOGLOBIN: 0.3 % (ref 0–3)
MONOCYTES # BLD AUTO: 0.6 K/UL (ref 0.3–1)
MONOCYTES # BLD AUTO: 0.6 K/UL (ref 0.3–1)
MONOCYTES NFR BLD: 10.4 % (ref 4–15)
MONOCYTES NFR BLD: 10.4 % (ref 4–15)
MV PEAK A VEL: 0.59 M/S
MV PEAK E VEL: 0.87 M/S
NEUTROPHILS # BLD AUTO: 3.8 K/UL (ref 1.8–7.7)
NEUTROPHILS # BLD AUTO: 3.8 K/UL (ref 1.8–7.7)
NEUTROPHILS NFR BLD: 66.2 % (ref 38–73)
NEUTROPHILS NFR BLD: 66.2 % (ref 38–73)
NITRITE UR QL STRIP: NEGATIVE
NRBC BLD-RTO: 0 /100 WBC
NRBC BLD-RTO: 0 /100 WBC
PCO2 BLDA: 49.5 MMHG (ref 35–45)
PH SMN: 7.47 [PH] (ref 7.35–7.45)
PH UR STRIP: 8 [PH] (ref 5–8)
PHOSPHATE SERPL-MCNC: 3.8 MG/DL (ref 2.7–4.5)
PISA TR MAX VEL: 1.42 M/S
PLATELET # BLD AUTO: 289 K/UL (ref 150–350)
PLATELET # BLD AUTO: 289 K/UL (ref 150–350)
PMV BLD AUTO: 9.9 FL (ref 9.2–12.9)
PMV BLD AUTO: 9.9 FL (ref 9.2–12.9)
PO2 BLDA: 29 MMHG (ref 40–60)
POC BE: 12 MMOL/L
POC SATURATED O2: 57 % (ref 95–100)
POC TCO2: 37 MMOL/L (ref 24–29)
POTASSIUM SERPL-SCNC: 3.2 MMOL/L (ref 3.5–5.1)
POTASSIUM SERPL-SCNC: 3.2 MMOL/L (ref 3.5–5.1)
POTASSIUM SERPL-SCNC: 4.3 MMOL/L (ref 3.5–5.1)
PROT SERPL-MCNC: 7.1 G/DL (ref 6–8.4)
PROT UR QL STRIP: NEGATIVE
PROTHROMBIN TIME: 16.8 SEC (ref 9–12.5)
PROTHROMBIN TIME: 16.8 SEC (ref 9–12.5)
RA MAJOR: 4.85 CM
RA PRESSURE: 8 MMHG
RA WIDTH: 4.59 CM
RBC # BLD AUTO: 3.9 M/UL (ref 4–5.4)
RBC # BLD AUTO: 3.9 M/UL (ref 4–5.4)
RIGHT VENTRICULAR END-DIASTOLIC DIMENSION: 4.62 CM
RV TISSUE DOPPLER FREE WALL SYSTOLIC VELOCITY 1 (APICAL 4 CHAMBER VIEW): 9.97 CM/S
SAMPLE: ABNORMAL
SINUS: 2.68 CM
SITE: ABNORMAL
SODIUM SERPL-SCNC: 138 MMOL/L (ref 136–145)
SODIUM SERPL-SCNC: 138 MMOL/L (ref 136–145)
SODIUM SERPL-SCNC: 139 MMOL/L (ref 136–145)
SP GR UR STRIP: 1 (ref 1–1.03)
STJ: 2.62 CM
TDI LATERAL: 0.12 M/S
TDI SEPTAL: 0.1 M/S
TDI: 0.11 M/S
TR MAX PG: 8 MMHG
TRICUSPID ANNULAR PLANE SYSTOLIC EXCURSION: 2.12 CM
TV REST PULMONARY ARTERY PRESSURE: 16 MMHG
URN SPEC COLLECT METH UR: NORMAL
WBC # BLD AUTO: 5.77 K/UL (ref 3.9–12.7)
WBC # BLD AUTO: 5.77 K/UL (ref 3.9–12.7)

## 2019-12-15 PROCEDURE — 86825 HLA X-MATH NON-CYTOTOXIC: CPT | Mod: 91

## 2019-12-15 PROCEDURE — 27000248 HC VAD-ADDITIONAL DAY: Mod: NTX

## 2019-12-15 PROCEDURE — 25000003 PHARM REV CODE 250: Mod: NTX | Performed by: INTERNAL MEDICINE

## 2019-12-15 PROCEDURE — 87086 URINE CULTURE/COLONY COUNT: CPT | Mod: NTX

## 2019-12-15 PROCEDURE — 86826 HLA X-MATCH NONCYTOTOXC ADDL: CPT | Mod: 91

## 2019-12-15 PROCEDURE — 83735 ASSAY OF MAGNESIUM: CPT | Mod: NTX

## 2019-12-15 PROCEDURE — 85025 COMPLETE CBC W/AUTO DIFF WBC: CPT | Mod: NTX

## 2019-12-15 PROCEDURE — 96375 TX/PRO/DX INJ NEW DRUG ADDON: CPT

## 2019-12-15 PROCEDURE — 25000003 PHARM REV CODE 250: Mod: NTX | Performed by: PHYSICIAN ASSISTANT

## 2019-12-15 PROCEDURE — 85610 PROTHROMBIN TIME: CPT | Mod: NTX

## 2019-12-15 PROCEDURE — 82803 BLOOD GASES ANY COMBINATION: CPT | Mod: NTX

## 2019-12-15 PROCEDURE — 20000000 HC ICU ROOM: Mod: NTX

## 2019-12-15 PROCEDURE — 87536 HIV-1 QUANT&REVRSE TRNSCRPJ: CPT | Mod: NTX

## 2019-12-15 PROCEDURE — 86920 COMPATIBILITY TEST SPIN: CPT

## 2019-12-15 PROCEDURE — 86825 HLA X-MATH NON-CYTOTOXIC: CPT

## 2019-12-15 PROCEDURE — 63600175 PHARM REV CODE 636 W HCPCS: Mod: NTX | Performed by: INTERNAL MEDICINE

## 2019-12-15 PROCEDURE — 86833 HLA CLASS II HIGH DEFIN QUAL: CPT

## 2019-12-15 PROCEDURE — 63600175 PHARM REV CODE 636 W HCPCS: Mod: NTX | Performed by: STUDENT IN AN ORGANIZED HEALTH CARE EDUCATION/TRAINING PROGRAM

## 2019-12-15 PROCEDURE — 25000003 PHARM REV CODE 250: Mod: NTX | Performed by: NURSE PRACTITIONER

## 2019-12-15 PROCEDURE — 80053 COMPREHEN METABOLIC PANEL: CPT | Mod: NTX

## 2019-12-15 PROCEDURE — 85730 THROMBOPLASTIN TIME PARTIAL: CPT | Mod: NTX

## 2019-12-15 PROCEDURE — 87340 HEPATITIS B SURFACE AG IA: CPT | Mod: NTX

## 2019-12-15 PROCEDURE — 96366 THER/PROPH/DIAG IV INF ADDON: CPT

## 2019-12-15 PROCEDURE — 86977 RBC SERUM PRETX INCUBJ/INHIB: CPT

## 2019-12-15 PROCEDURE — 36415 COLL VENOUS BLD VENIPUNCTURE: CPT | Mod: NTX

## 2019-12-15 PROCEDURE — 86826 HLA X-MATCH NONCYTOTOXC ADDL: CPT

## 2019-12-15 PROCEDURE — 84100 ASSAY OF PHOSPHORUS: CPT | Mod: NTX

## 2019-12-15 PROCEDURE — 87517 HEPATITIS B DNA QUANT: CPT | Mod: NTX

## 2019-12-15 PROCEDURE — 81003 URINALYSIS AUTO W/O SCOPE: CPT | Mod: NTX

## 2019-12-15 PROCEDURE — 99900035 HC TECH TIME PER 15 MIN (STAT): Mod: NTX

## 2019-12-15 PROCEDURE — 87522 HEPATITIS C REVRS TRNSCRPJ: CPT | Mod: NTX

## 2019-12-15 PROCEDURE — 86901 BLOOD TYPING SEROLOGIC RH(D): CPT | Mod: NTX

## 2019-12-15 PROCEDURE — 86832 HLA CLASS I HIGH DEFIN QUAL: CPT

## 2019-12-15 PROCEDURE — 93463 DRUG ADMIN & HEMODYNMIC MEAS: CPT | Mod: NTX

## 2019-12-15 PROCEDURE — 63600367 HC NITRIC OXIDE PER HOUR: Mod: NTX

## 2019-12-15 PROCEDURE — 80048 BASIC METABOLIC PNL TOTAL CA: CPT

## 2019-12-15 PROCEDURE — 94761 N-INVAS EAR/PLS OXIMETRY MLT: CPT | Mod: NTX

## 2019-12-15 PROCEDURE — 83615 LACTATE (LD) (LDH) ENZYME: CPT | Mod: NTX

## 2019-12-15 PROCEDURE — 27000221 HC OXYGEN, UP TO 24 HOURS: Mod: NTX

## 2019-12-15 RX ORDER — POTASSIUM CHLORIDE 29.8 MG/ML
40 INJECTION INTRAVENOUS ONCE
Status: COMPLETED | OUTPATIENT
Start: 2019-12-15 | End: 2019-12-15

## 2019-12-15 RX ORDER — CEFAZOLIN SODIUM 1 G/3ML
1 INJECTION, POWDER, FOR SOLUTION INTRAMUSCULAR; INTRAVENOUS ONCE
Status: DISCONTINUED | OUTPATIENT
Start: 2019-12-15 | End: 2019-12-16

## 2019-12-15 RX ORDER — HYDROCODONE BITARTRATE AND ACETAMINOPHEN 500; 5 MG/1; MG/1
TABLET ORAL
Status: DISCONTINUED | OUTPATIENT
Start: 2019-12-15 | End: 2019-12-16

## 2019-12-15 RX ORDER — METHYLPREDNISOLONE SODIUM SUCCINATE 500 MG/8ML
500 INJECTION INTRAMUSCULAR; INTRAVENOUS ONCE
Status: COMPLETED | OUTPATIENT
Start: 2019-12-15 | End: 2019-12-16

## 2019-12-15 RX ORDER — METHYLPREDNISOLONE SODIUM SUCCINATE 500 MG/8ML
500 INJECTION INTRAMUSCULAR; INTRAVENOUS ONCE
Status: DISCONTINUED | OUTPATIENT
Start: 2019-12-15 | End: 2019-12-16

## 2019-12-15 RX ORDER — MUPIROCIN 20 MG/G
OINTMENT TOPICAL
Status: DISCONTINUED | OUTPATIENT
Start: 2019-12-15 | End: 2019-12-16

## 2019-12-15 RX ADMIN — SUCRALFATE 1 G: 1 SUSPENSION ORAL at 01:12

## 2019-12-15 RX ADMIN — HYDRALAZINE HYDROCHLORIDE 50 MG: 50 TABLET ORAL at 09:12

## 2019-12-15 RX ADMIN — HYDRALAZINE HYDROCHLORIDE 50 MG: 50 TABLET ORAL at 06:12

## 2019-12-15 RX ADMIN — MEXILETINE HYDROCHLORIDE 150 MG: 150 CAPSULE ORAL at 06:12

## 2019-12-15 RX ADMIN — SENNOSIDES AND DOCUSATE SODIUM 1 TABLET: 8.6; 5 TABLET ORAL at 09:12

## 2019-12-15 RX ADMIN — GABAPENTIN 200 MG: 100 CAPSULE ORAL at 09:12

## 2019-12-15 RX ADMIN — VENLAFAXINE 150 MG: 37.5 TABLET ORAL at 10:12

## 2019-12-15 RX ADMIN — FAMOTIDINE 20 MG: 20 TABLET ORAL at 09:12

## 2019-12-15 RX ADMIN — MEXILETINE HYDROCHLORIDE 150 MG: 150 CAPSULE ORAL at 01:12

## 2019-12-15 RX ADMIN — MIRTAZAPINE 15 MG: 15 TABLET, FILM COATED ORAL at 09:12

## 2019-12-15 RX ADMIN — POTASSIUM CHLORIDE 40 MEQ: 400 INJECTION, SOLUTION INTRAVENOUS at 06:12

## 2019-12-15 RX ADMIN — GABAPENTIN 200 MG: 100 CAPSULE ORAL at 10:12

## 2019-12-15 RX ADMIN — PANTOPRAZOLE SODIUM 40 MG: 40 TABLET, DELAYED RELEASE ORAL at 06:12

## 2019-12-15 RX ADMIN — POTASSIUM CHLORIDE 40 MEQ: 1500 TABLET, EXTENDED RELEASE ORAL at 09:12

## 2019-12-15 RX ADMIN — MAGNESIUM OXIDE TAB 400 MG (241.3 MG ELEMENTAL MG) 400 MG: 400 (241.3 MG) TAB at 10:12

## 2019-12-15 RX ADMIN — SENNOSIDES AND DOCUSATE SODIUM 1 TABLET: 8.6; 5 TABLET ORAL at 10:12

## 2019-12-15 RX ADMIN — ASPIRIN 325 MG: 325 TABLET, DELAYED RELEASE ORAL at 10:12

## 2019-12-15 RX ADMIN — FUROSEMIDE 80 MG: 10 INJECTION, SOLUTION INTRAMUSCULAR; INTRAVENOUS at 10:12

## 2019-12-15 RX ADMIN — AMIODARONE HYDROCHLORIDE 400 MG: 200 TABLET ORAL at 10:12

## 2019-12-15 RX ADMIN — FUROSEMIDE 80 MG: 10 INJECTION, SOLUTION INTRAMUSCULAR; INTRAVENOUS at 04:12

## 2019-12-15 RX ADMIN — MEXILETINE HYDROCHLORIDE 150 MG: 150 CAPSULE ORAL at 09:12

## 2019-12-15 RX ADMIN — POTASSIUM CHLORIDE 40 MEQ: 1500 TABLET, EXTENDED RELEASE ORAL at 10:12

## 2019-12-15 RX ADMIN — PANTOPRAZOLE SODIUM 40 MG: 40 TABLET, DELAYED RELEASE ORAL at 04:12

## 2019-12-15 RX ADMIN — ALPRAZOLAM 0.25 MG: 0.25 TABLET ORAL at 03:12

## 2019-12-15 RX ADMIN — HYDRALAZINE HYDROCHLORIDE 50 MG: 50 TABLET ORAL at 01:12

## 2019-12-15 NOTE — PROGRESS NOTES
12/15/2019  Terra Porter    Current provider:  Jolene Lua MD      I, Terra Porter, rounded on Deborah Navas to ensure all mechanical assist device settings (IABP or VAD) were appropriate and all parameters were within limits.  I was able to ensure all back up equipment was present, the staff had no issues, and the Perfusion Department daily rounding was complete.    1:49 PM

## 2019-12-15 NOTE — TELEPHONE ENCOUNTER
"Notified by Shubham Nunes, , of heart offer with donor information. Spoke to patient and Identified no acute medical issues during telephone assessment. Pt instructed NPO.    The donor's reported PHS increased risk due to hemodilution. Based on actual UNOS data, all organs carry a very small risk of transmission of HIV, hepatitis B or hepatitis C despite negative testing. According to PHS, the estimated risk of seroconversion is 1 in 2,000 for "Increased-risk donors" compared to 1 in 10,000 for other donors. The risk of clinical illness from an transmitted infection is much less than this due to the availability highly effective oral drugs for all three of these viruses. While the patient has the right to decline any organ for any reason, available scientific data do not support turning down and organ based on behavorial risk factors as the risks associated with waiting linger for the transplant are many times greater. Informed patient that Dr. Lua thinks this is a good heart for the patient.    Patient was given an offer the have Dr. Lua call her if she wanted to discuss it before she accepts the organ, but also informed she would have the opportunity to see and talk to Dr. Lua in the morning and before she went to operating room.    Patient was also informed that if she turned down this donor, she would not be punished or removed from the transplant list, that she would remain on the list at her current status. However, by waiting on the list longer rather than receiving this transplant, she will face a greater risk of death than any risk from the slight possibility of transmitted infection.    Reviewed with Dr. Lua the PHS increased risk donor information shared with the patient. Dr. Lua is aware that patient does not wish to speak to a doctor when admitted in reference to the donor risk factors.    Patient verbalized willingness for transplant. Dr. Lua aware patient has been " called in for transplant and will speak to her in the morning.

## 2019-12-15 NOTE — PLAN OF CARE
CM[facility] DAILY GOALS       A: Awake    RASS: Goal - RASS Goal: 0-->alert and calm  Actual - RASS (Patel Agitation-Sedation Scale): 0-->alert and calm   Restraint necessity:    B: Breath   SBT: Not intubated   C: Coordinate A & B, analgesics/sedatives   Pain: managed    SAT: Not intubated  D: Delirium   CAM-ICU: Overall CAM-ICU: Negative  E: Early Mobility   MOVE Screen: Pass   Activity: Activity Management: activity adjusted per tolerance  FAS: Feeding/Nutrition   Diet order: Diet/Nutrition Received: low saturated fat/low cholesterol, 2 gram sodium,   Fluid restriction: Fluid Requirement: 2000 cc FR  T: Thrombus   DVT prophylaxis: VTE Required Core Measure: Pharmacological prophylaxis initiated/maintained, (SCDs) Sequential compression device initiated/maintained  H: HOB Elevation   Head of Bed (HOB): HOB at 60-90 degrees  U: Ulcer Prophylaxis   GI: yes  G: Glucose control   managed    S: Skin   Bundle compliance: yes   Bathing/Skin Care: bath, chlorhexidine, bath, complete, dressed/undressed, linen changed Date: 12/13PM  B: Bowel Function   no issues   I: Indwelling Catheters   Fonseca necessity:     CVC necessity: Yes   IPAD offered: Yes  D: De-escalation Antibx   No  Plan for the day   Wean down on epi and increase speed on VAD. VAD speed now 5800 from 5200. No ectopy, and per pt she feels fine at this time. Epi now at 0.01 mcg/kg/min, latest SvO2 57. Trending labs. CVP 12/14/14. Pt intermittently nauseus this shift. 2 BMs and 1800 cc UO. Pt remains status 2A. Plan for RHC Monday 12/16 when INR downtrends.  Family/Goals of care/Code Status   Code Status: Full Code     No acute events throughout day, VS and assessment per flow sheet, patient progressing towards goals as tolerated, plan of care reviewed with Deborah Navas and family, all concerns addressed, will continue to monitor.

## 2019-12-15 NOTE — PLAN OF CARE
Problem: Adult Inpatient Plan of Care  Goal: Plan of Care Review  Outcome: Ongoing, Progressing  Flowsheets (Taken 12/15/2019 1723)  Plan of Care Reviewed With: patient; family  Goal: Patient-Specific Goal (Individualization)  Outcome: Ongoing, Progressing  Flowsheets (Taken 12/15/2019 1723)  Individualized Care Needs: Monitor nausea; stand for short periods of time  Anxieties, Fears or Concerns: Anxiety about anticipated heart surgery tonight  Patient-Specific Goals (Include Timeframe): Prepare for heart transplant surgery  Goal: Absence of Hospital-Acquired Illness or Injury  Outcome: Ongoing, Progressing     Problem: Adjustment to Device (Ventricular Assist Device)  Goal: Optimal Adjustment to Device  Outcome: Ongoing, Progressing     Problem: Infection (Ventricular Assist Device)  Goal: Absence of Infection Signs/Symptoms  Outcome: Ongoing, Progressing     [facility] DAILY GOALS     A: Awake    RASS: Goal - RASS Goal: 0-->alert and calm  Actual - RASS (Patel Agitation-Sedation Scale): 0-->alert and calm   Restraint necessity: N/A  B: Breath   SBT: Not intubated   C: Coordinate A & B, analgesics/sedatives   Pain: managed    SAT: Not intubated  D: Delirium   CAM-ICU: Overall CAM-ICU: Negative  E: Early Mobility   MOVE Screen: Pass   Activity: Activity Management: activity adjusted per tolerance  FAS: Feeding/Nutrition   Diet order: Diet/Nutrition Received: NPO,   Fluid restriction: Fluid Requirement: 2000 cc FR  T: Thrombus   DVT prophylaxis: VTE Required Core Measure: Pharmacological prophylaxis initiated/maintained  H: HOB Elevation   Head of Bed (HOB): HOB flat  U: Ulcer Prophylaxis   GI: yes  G: Glucose control   managed    S: Skin   Bundle compliance: yes   Bathing/Skin Care: bath, complete, bath, chlorhexidine, back care, dressed/undressed, linen changed, shampoo Date: 12/15/19 pm bath  B: Bowel Function   no issues   I: Indwelling Catheters   Fonseca necessity: No   CVC necessity: Yes   IPAD offered:  Unknown  D: De-escalation Antibx   Abx pre-op  Plan for the day   Prepare for scheduled heart transplant surgery  Family/Goals of care/Code Status   Code Status: Full Code     Pt AAO; epinephrine gtt infusing per MD order. Pt remains on 4L NC with nitric oxide. Pt went to radiology for standing x-ray. Plan for heart transplant surgery during pm shift. No acute events throughout day, VS and assessment per flow sheet, patient progressing towards goals as tolerated, plan of care reviewed with Deborah Navas and family, all concerns addressed, will continue to monitor.

## 2019-12-15 NOTE — ANESTHESIA PREPROCEDURE EVALUATION
[facility]  Anesthesia Pre-Operative Evaluation         Patient Name: Deborah Navas  YOB: 1970  MRN: [id number]    SUBJECTIVE:     Pre-operative evaluation for Procedure(s) (LRB):  TRANSPLANT, HEART (N/A)     12/15/2019    Deborah Navas is a 49 y.o. female w/ a significant PMHx of NICM s/p HM3 9/10/19 (post op course uncomplicated with the exception of+ diaphragmatic stimulation of LV lead and pleural effusion with chest tube placement, atrial flutter with NADINE/DCCV). On coumadin and .    Admitted 11/14 for multiple VT episodes. CHB with 100% V pacing (LV lead off). Shocked x3 11/25 for MMVT; loaded on amio and mexilitine increased to 200 QD at that time. No further events noted on interrogation 12/10. She has developed RV failure and low flow alarms during admission. Unable to support pt with inotropes long term due to ventricular arrhythmias.     Last echo 12/4/19. AV does not open. LVAD at 5800. 0.01 of epi, 20 of NO.    Patient now presents for the above procedure(s).      LDA:   L IJ triple lumen  R midline      Prev airway:  Present Prior to Hospital Arrival?: No; Placement Date: 09/10/19; Placement Time: 0724; Method of Intubation: Direct laryngoscopy; Inserted by: Anesthesia Resident; Airway Device: Endotracheal Tube; Mask Ventilation: Easy - oral; Intubated: Postinduction; Blade: Vick #4; Airway Device Size: 7.5; Style: Cuffed; Cuff Inflation: Minimal occlusive pressure; Inflation Amount: 10; Placement Verified By: Auscultation, Capnometry, ETT Condensation; Grade: Grade IV; Complicating Factors: Anterior larynx, Small mouth, Oropharyngeal edema or fat, Obesity; Intubation Findings: Positive EtCO2, Bilateral breath sounds, Atraumatic/Condition of teeth unchanged;  Depth of Insertion: 24; Securment: Lips; Complications: None; Breath Sounds: Equal Bilateral; Insertion Attempts: 1; Removal Date: 09/10/19;  Removal Time: 1500; Removal Indication  & Assessment: removed per order, removed by previous caregiver    Drips:    epinephrine 0.01 mcg/kg/min (12/14/19 1800)    nitric oxide gas         Patient Active Problem List   Diagnosis    Knee pain    Pes anserine bursitis    ICD (implantable cardioverter-defibrillator) in place    Ventricular fibrillation    Acute on chronic combined systolic and diastolic heart failure    Congestive cardiomyopathy    History of ventricular fibrillation    History of ventricular tachycardia    Heart transplant candidate    Enlarged thyroid    Abnormal CT scan    CKD (chronic kidney disease)    Chronic systolic congestive heart failure    LVAD (left ventricular assist device) present    Pleural effusion    Anticoagulation monitoring, INR range 2-3    Long term (current) use of anticoagulants    Ventricular tachycardia    Essential hypertension    Left ventricular assist device (LVAD) complication    Organ transplant candidate    GLO (acute kidney injury)    RVF (right ventricular failure)    Nausea    Kidney transplant candidate       Review of patient's allergies indicates:   Allergen Reactions    Adhesive Blisters     Reaction to area in chest and up only    Codeine Itching       Current Inpatient Medications:   amiodarone  400 mg Oral BID    aspirin  325 mg Oral Daily    famotidine  20 mg Oral QHS    furosemide  80 mg Intravenous TID    gabapentin  200 mg Oral BID    hydrALAZINE  50 mg Oral Q8H    magnesium oxide  400 mg Oral Daily    mexiletine  150 mg Oral Q8H    mirtazapine  15 mg Oral QHS    pantoprazole  40 mg Oral BID AC    polyethylene glycol  17 g Oral Daily    potassium chloride  40 mEq Oral BID    senna-docusate 8.6-50 mg  1 tablet Oral BID    sucralfate  1 g Oral QID (AC & HS)    venlafaxine  150 mg Oral Daily       No current facility-administered medications on file prior to encounter.      Current Outpatient Medications on File Prior to Encounter   Medication Sig  Dispense Refill    ALPRAZolam (XANAX) 0.25 MG tablet Take 1 tablet (0.25 mg total) by mouth 2 (two) times daily as needed for Anxiety. 60 tablet 3    amiodarone (PACERONE) 200 MG Tab Take 2 tablets (400mg) by mouth twice daily for 14 days then take 1 tablet (400mg) by mouth daily 72 tablet 11    GABAPENTIN ORAL Take 200 mg by mouth 2 (two) times daily.      lisinopril 10 MG tablet Take 1 tablet (10 mg total) by mouth 2 (two) times daily. 180 tablet 3    magnesium oxide (MAG-OX) 400 mg (241.3 mg magnesium) tablet Take 1 tablet (400 mg total) by mouth once daily. 30 tablet 11    mexiletine (MEXITIL) 150 MG Cap Take 1 capsule (150 mg total) by mouth every 8 (eight) hours. 90 capsule 11    oxyCODONE-acetaminophen (PERCOCET) 5-325 mg per tablet Take 1 tablet by mouth once daily.      VENLAFAXINE HCL (EFFEXOR ORAL) Take 150 mg by mouth once daily.      warfarin (COUMADIN) 3 MG tablet Take 3mg orally daily every Mon/Wed/Fri and 1.5mg orally on all other days 45 tablet 11    zolpidem (AMBIEN) 5 MG Tab Take 5 mg by mouth nightly as needed.      aspirin (ECOTRIN) 325 MG EC tablet Take 1 tablet (325 mg total) by mouth once daily. 90 tablet 3    FLUZONE QUAD [id number], PF, 60 mcg (15 mcg x 4)/0.5 mL Syrg TO BE ADMINISTERED BY PHARMACIST FOR IMMUNIZATION  0    furosemide (LASIX) 20 MG tablet Use as directed for weight gain >3 lbs 30 tablet 11    mirtazapine (REMERON) 15 MG tablet Take 1 tablet (15 mg total) by mouth every evening. 90 tablet 3    pantoprazole (PROTONIX) 40 MG tablet Take 1 tablet (40 mg total) by mouth once daily. 90 tablet 3    senna-docusate 8.6-50 mg (PERICOLACE) 8.6-50 mg per tablet Take 2 tablets by mouth 2 (two) times daily as needed for Constipation.         Past Surgical History:   Procedure Laterality Date    BACK SURGERY      2007    BONE GRAFT Left     from Left hip to Left FA    eardrum reconstruction  1980    ELBOW SURGERY Left [id number]    FOREARM FRACTURE SURGERY Bilateral  [id number]    multiple surgeries    INSERTION OF GRAFT TO PERICARDIUM  9/10/2019    Procedure: INSERTION, GRAFT, PERICARDIUM;  Surgeon: Shar Walden MD;  Location: [facility] OR [address] [place] [address];  Service: Cardiovascular;;    INSERTION OF IMPLANTABLE CARDIOVERTER-DEFIBRILLATOR (ICD) GENERATOR WITH TWO EXISTING LEADS      INSERTION OF PACEMAKER Left     LEFT VENTRICULAR ASSIST DEVICE N/A 9/10/2019    Procedure: INSERTION-LEFT VENTRICULAR ASSIST DEVICE;  Surgeon: Shar Walden MD;  Location: [facility] OR [address] [place] [address];  Service: Cardiovascular;  Laterality: N/A;  DT HM3     LUMBAR FUSION  2007    L4-L5    RIGHT HEART CATHETERIZATION Right 9/4/2019    Procedure: INSERTION, CATHETER, RIGHT HEART;  Surgeon: Vi Bryant MD;  Location: [facility] CATH LAB;  Service: Cardiology;  Laterality: Right;    RIGHT HEART CATHETERIZATION N/A 11/18/2019    Procedure: INSERTION, CATHETER, RIGHT HEART;  Surgeon: Eric Antunez Jr., MD;  Location: [facility] CATH LAB;  Service: Cardiology;  Laterality: N/A;    SINUS SURGERY Right 1994    with lymph nodes    STERNAL WOUND CLOSURE  9/10/2019    Procedure: CLOSURE, WOUND, STERNUM;  Surgeon: Shar Walden MD;  Location: [facility] OR [address] [place] [address];  Service: Cardiovascular;;    TEMPOROMANDIBULAR JOINT SURGERY Right 1988    TONSILLECTOMY      TREATMENT OF CARDIAC ARRHYTHMIA N/A 9/24/2019    Procedure: CARDIOVERSION;  Surgeon: Moe Barrera MD;  Location: [facility] EP LAB;  Service: Cardiology;  Laterality: N/A;  AF, DCCV/NADINE, anes, DM, Rm 3073    TYMPANOSTOMY TUBE PLACEMENT  1971- 1979    multiple tube placements       Social History     Socioeconomic History    Marital status: Single     Spouse name: Not on file    Number of children: Not on file    Years of education: Not on file    Highest education level: Not on file   Occupational History    Not on file   Social Needs    Financial resource strain: Not on file    Food insecurity:     Worry: Not on file     Inability: Not on file    Transportation needs:      Medical: Not on file     Non-medical: Not on file   Tobacco Use    Smoking status: Never Smoker    Smokeless tobacco: Never Used   Substance and Sexual Activity    Alcohol use: No    Drug use: No    Sexual activity: Not Currently   Lifestyle    Physical activity:     Days per week: Not on file     Minutes per session: Not on file    Stress: Not on file   Relationships    Social connections:     Talks on phone: Not on file     Gets together: Not on file     Attends [employer] service: Not on file     Active member of club or organization: Not on file     Attends meetings of clubs or organizations: Not on file     Relationship status: Not on file   Other Topics Concern    Not on file   Social History Narrative    Not on file       OBJECTIVE:     Vital Signs Range (Last 24H):  Temp:  [36.7 °C (98 °F)-36.9 °C (98.4 °F)]   Pulse:  []   Resp:  [14-60]   BP: (76-90)/(0)   SpO2:  [98 %-100 %]       Significant Labs:  Lab Results   Component Value Date    WBC 7.97 12/14/2019    HGB 9.6 (L) 12/14/2019    HCT 34.0 (L) 12/14/2019     12/14/2019    CHOL 110 (L) 09/06/2019    TRIG 86 09/06/2019    HDL 47 09/06/2019    ALT 16 12/13/2019    AST 18 12/13/2019     12/14/2019    K 4.0 12/14/2019    CL 98 12/14/2019    CREATININE 1.9 (H) 12/14/2019    BUN 22 (H) 12/14/2019    CO2 31 (H) 12/14/2019    TSH 2.290 09/06/2019    INR 2.2 (H) 12/14/2019    HGBA1C 6.5 (H) 09/06/2019       Diagnostic Studies: No relevant studies.    EKG:   Results for orders placed or performed during the hospital encounter of 11/13/19   EKG 12-lead    Collection Time: 11/29/19 12:16 PM    Narrative    Test Reason : R07.9    Vent. Rate : 178 BPM     Atrial Rate : 326 BPM     P-R Int : 088 ms          QRS Dur : 162 ms      QT Int : 166 ms       P-R-T Axes : 093 -73 000 degrees     QTc Int : 285 ms    ventricular paced rhythm. Appears to be underlying sinus, atrail sensed,  but p-wave axis uncertain  Abnormal ECG  When compared  with ECG of 20-NOV-2019 09:32,  Current undetermined rhythm precludes rhythm comparison, needs review  Confirmed by Adelfo Ramsay MD ([id number]) on 11/29/2019 12:50:16 PM    Referred By: MOIRA CLEANING           Confirmed By:Adelfo Ramsay MD       ECHOCARDIOGRAM:  TTE:  Results for orders placed or performed during the hospital encounter of 11/13/19   Echo Color Flow Doppler? Yes; Bubble Contrast? No   Result Value Ref Range    Ascending aorta 3.27 cm    STJ 2.84 cm    IVS 0.64 0.6 - 1.1 cm    LA size 2.92 cm    Left Atrium Major Axis 5.59 cm    LVIDD 6.05 (A) 3.5 - 6.0 cm    LVIDS 5.18 (A) 2.1 - 4.0 cm    LVOT diameter 1.86 cm    PW 0.64 0.6 - 1.1 cm    MV Peak E Scott 1.46 m/s    RA Major Axis 5.20 cm    RA Width 5.50 cm    RVDD 4.90 cm    Sinus 3.08 cm    TAPSE 1.14 cm    TR Max Scott 1.81 m/s    LA WIDTH 2.98 cm    LV Diastolic Volume 183.61 mL    LV Systolic Volume 128.56 mL    FS 14 %    LV mass 144.03 g    Left Ventricle Relative Wall Thickness 0.21 cm    LVOT area 2.7 cm2    LV Systolic Volume Index 61.4 mL/m2    LV Diastolic Volume Index 87.70 mL/m2    LV Mass Index 69 g/m2    Triscuspid Valve Regurgitation Peak Gradient 13 mmHg    BSA 2.17 m2    Right Atrial Pressure (from IVC) 15 mmHg    LA volume 41.38 cm3    TV rest pulmonary artery pressure 28 mmHg    LA Volume Index 19.8 mL/m2    Left Atrium Minor Axis 5.60 cm    Narrative    · LVAD present. Base speed is 5100 RPMs. The pump type is a Heartmate III.  · The interventricular septum appears midline. The aortic valve does not   open.  · Severely decreased left ventricular systolic function. The estimated   ejection fraction is 20%  · Left ventricular diastolic dysfunction.  · Mild left ventricular enlargement.  · Mild mitral regurgitation.  · Septal wall has abnormal motion.  · Moderate right ventricular enlargement.  · Mildly to moderately reduced right ventricular systolic function.  · At least moderate tricuspid regurgitation. V-shaped contour is   suggestive  of more significant TR than seen by color flow doppler.  · The estimated PA systolic pressure is 28 mm Hg  · Elevated central venous pressure (15 mm Hg).          NADINE:  Results for orders placed or performed during the hospital encounter of 09/04/19   Transesophageal echo (NADINE) with possible cardioversion   Result Value Ref Range    BSA 2.24 m2    Narrative    · Presence of LVAD  · Normal appearing left atrial appendage. No thrombus is present in the   appendage. Left atrial appendage velocities varied but the highest were   over 1.5 m/s.  · Severe left ventricular enlargement.  · Severely decreased left ventricular systolic function. The estimated   ejection fraction is 25%  · Low normal right ventricular systolic function.  · Severe tricuspid regurgitation. MR not evaluated.  · No interatrial septal defect present.              ASSESSMENT/PLAN:         Anesthesia Evaluation    I have reviewed the Patient Summary Reports.     I have reviewed the Medications.     Review of Systems  Anesthesia Hx:  No problems with previous Anesthesia  History of prior surgery of interest to airway management or planning:  Denies Personal Hx of Anesthesia complications.   Cardiovascular:   Dysrhythmias CHF ECG has been reviewed.    Pulmonary:   Sleep Apnea    Renal/:   Chronic Renal Disease, CRI    Neurological:   Denies CVA. Denies Seizures.        Physical Exam  General:  Well nourished    Airway/Jaw/Neck:  Airway Findings: Mouth Opening: Small, but > 3cm Tongue: Large  General Airway Assessment: Adult  Mallampati: III  Improves to III with phonation.  TM Distance: 4 - 6 cm  Jaw/Neck Findings:  Neck ROM: Normal ROM     Eyes/Ears/Nose:  EYES/EARS/NOSE FINDINGS: Normal    Chest/Lungs:  Chest/Lungs Findings: Clear to auscultation, Normal Respiratory Rate     Heart/Vascular:  Heart Findings: (LVAD) Vascular Findings:  Edema Locations: LLE, RLE  Edema: +1 or +2     Abdomen:  Abdomen Findings:  Normal, Nontender, Soft       Mental  Status:  Mental Status Findings:  Cooperative, Alert and Oriented         Anesthesia Plan  Type of Anesthesia, risks & benefits discussed:  Anesthesia Type:  general  Patient's Preference:   Intra-op Monitoring Plan: arterial line, central line and standard ASA monitors  Intra-op Monitoring Plan Comments:   Post Op Pain Control Plan: multimodal analgesia, IV/PO Opioids PRN and per primary service following discharge from PACU  Post Op Pain Control Plan Comments:   Induction:   IV  Beta Blocker:  Patient is not currently on a Beta-Blocker (No further documentation required).       Informed Consent: Patient understands risks and agrees with Anesthesia plan.  Questions answered. Anesthesia consent signed with patient.  ASA Score: 4  emergent   Day of Surgery Review of History & Physical:    H&P update referred to the surgeon.         Ready For Surgery From Anesthesia Perspective.

## 2019-12-15 NOTE — ASSESSMENT & PLAN NOTE
- HeartMate 3 Implanted 9/10/19 as DT.  - Implanted by Dr. Walden  - INR theraputic.  - Antiplatelets:  mg.  - LDH is stable Will monitor daily.   - Speed set at 5100  - Epi @ .01, NO @ 20.   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 Urgent selection 11/26, approved for transplant. Listed tier 2.    Procedure: Device Interrogation Including analysis of device parameters  Current Settings: Ventricular Assist Device  Review of device function is stable    TXP LVAD INTERROGATIONS 12/15/2019 12/15/2019 12/15/2019 12/15/2019 12/15/2019 12/15/2019 12/15/2019   Type HeartMate3 HeartMate3 HeartMate3 HeartMate3 HeartMate3 HeartMate3 HeartMate3   Flow 5.0 4.8 4.8 4.6 5.0 4.9 4.6   Speed 5650 5850 5800 5800 5800 5800 5800   PI 2.0 3.1 3.0 2.9 2.7 2.7 3.4   Power ([place]) 4.3 4.4 4.6 4.6 4.5 4.5 4.5   LSL 5400 5400 5400 5400 5400 5400 5400   Pulsatility Pulse Intermittent pulse No Pulse No Pulse No Pulse No Pulse No Pulse

## 2019-12-15 NOTE — NURSING
Pt transported in wheelchair by RN, PCT, and RT to radiology for standing x-ray. Epi gtt infusing; pt on nitric oxide. VAD placed on battery. No acute events during procedure. Will continue to monitor.

## 2019-12-15 NOTE — SUBJECTIVE & OBJECTIVE
Interval History: to go to OR today for OHT    Continuous Infusions:   epinephrine 0.01 mcg/kg/min (12/15/19 0800)    nitric oxide gas       Scheduled Meds:   amiodarone  400 mg Oral BID    aspirin  325 mg Oral Daily    ceFAZolin (ANCEF) IVPB  1 g Intravenous Once    famotidine  20 mg Oral QHS    furosemide  80 mg Intravenous TID    gabapentin  200 mg Oral BID    hydrALAZINE  50 mg Oral Q8H    magnesium oxide  400 mg Oral Daily    methylPREDNISolone sodium succinate  500 mg Intravenous Once    methylPREDNISolone sodium succinate  500 mg Intravenous Once    mexiletine  150 mg Oral Q8H    mirtazapine  15 mg Oral QHS    pantoprazole  40 mg Oral BID AC    polyethylene glycol  17 g Oral Daily    potassium chloride  40 mEq Oral BID    senna-docusate 8.6-50 mg  1 tablet Oral BID    sucralfate  1 g Oral QID (AC & HS)    venlafaxine  150 mg Oral Daily     PRN Meds:sodium chloride, sodium chloride, ALPRAZolam, mupirocin, ondansetron, pneumoc 13-amber conj-dip cr(PF), promethazine, sodium chloride, white petrolatum, zolpidem    Review of patient's allergies indicates:   Allergen Reactions    Adhesive Blisters     Reaction to area in chest and up only    Codeine Itching     Objective:     Vital Signs (Most Recent):  Temp: 98.2 °F (36.8 °C) (12/15/19 0700)  Pulse: 80 (12/15/19 0800)  Resp: (!) 28 (12/15/19 0800)  BP: (!) 80/0 (12/15/19 0700)  SpO2: 100 % (12/15/19 0700) Vital Signs (24h Range):  Temp:  [98 °F (36.7 °C)-98.4 °F (36.9 °C)] 98.2 °F (36.8 °C)  Pulse:  [] 80  Resp:  [14-37] 28  SpO2:  [98 %-100 %] 100 %  BP: (80-90)/(0) 80/0     Patient Vitals for the past 72 hrs (Last 3 readings):   Weight   12/15/19 0305 101.6 kg (223 lb 15.8 oz)   12/14/19 0300 103.2 kg (227 lb 8.2 oz)   12/13/19 1000 101 kg (222 lb 10.6 oz)     Body mass index is 36.15 kg/m².      Intake/Output Summary (Last 24 hours) at 12/15/2019 0835  Last data filed at 12/15/2019 0800  Gross per 24 hour   Intake 1320.85 ml   Output  6100 ml   Net -4779.15 ml       Hemodynamic Parameters:         Physical Exam   Constitutional: She is oriented to person, place, and time. She appears well-developed and well-nourished.   HENT:   Mouth/Throat: Oropharynx is clear and moist.   Eyes: EOM are normal.   Neck: JVD present.   Cardiovascular: Normal rate, regular rhythm and intact distal pulses.   Smooth VAD hum   Pulmonary/Chest: Effort normal and breath sounds normal. No respiratory distress. She has no rales.   Abdominal: Soft. Bowel sounds are normal. She exhibits no distension. There is no tenderness. There is no guarding.   Musculoskeletal: She exhibits no edema.   Neurological: She is alert and oriented to person, place, and time.   Skin: Skin is warm.   Psychiatric: She has a normal mood and affect.   Nursing note and vitals reviewed.      Significant Labs:  CBC:  Recent Labs   Lab 12/13/19  0400 12/14/19  0403 12/15/19  0315   WBC 7.06 7.97 5.77  5.77   RBC 3.97* 3.95* 3.90*  3.90*   HGB 9.7* 9.6* 9.5*  9.5*   HCT 34.1* 34.0* 32.6*  32.6*    309 289  289   MCV 86 86 84  84   MCH 24.4* 24.3* 24.4*  24.4*   MCHC 28.4* 28.2* 29.1*  29.1*     BNP:  Recent Labs   Lab 12/09/19  0333 12/11/19  0500 12/13/19  0400   * 369* 503*     CMP:  Recent Labs   Lab 12/11/19  0500  12/13/19  0400 12/14/19  0403 12/14/19  1741 12/15/19  0315   *   < > 120* 147* 97 95  95   CALCIUM 9.1   < > 9.4 9.4 9.2 9.5  9.5   ALBUMIN 3.4*  --  3.5  --   --  3.4*   PROT 6.9  --  7.1  --   --  7.1      < > 139 138 139 138  138   K 4.1   < > 4.1 4.8 4.0 3.2*  3.2*   CO2 28   < > 30* 29 31* 33*  33*      < > 97 97 98 93*  93*   BUN 23*   < > 23* 23* 22* 23*  23*   CREATININE 1.8*   < > 2.1* 2.1* 1.9* 1.9*  1.9*   ALKPHOS 87  --  89  --   --  90   ALT 16  --  16  --   --  15   AST 20  --  18  --   --  16   BILITOT 0.4  --  0.4  --   --  0.4    < > = values in this interval not displayed.      Coagulation:   Recent Labs   Lab  12/13/19  0400 12/14/19  0403 12/15/19  0315   INR 2.5* 2.2* 1.7*  1.7*   APTT 35.7* 35.6* 31.2  31.2     LDH:  Recent Labs   Lab 12/13/19  0400 12/14/19  0403 12/15/19  0315    257 246     Microbiology:  Microbiology Results (last 7 days)     Procedure Component Value Units Date/Time    Urine Culture High Risk [[id number]] Collected:  12/15/19 0646    Order Status:  Sent Specimen:  Urine, Catheterized Updated:  12/15/19 0654    Urine culture [[id number]] Collected:  12/15/19 0646    Order Status:  Canceled Specimen:  Urine, Catheterized           I have reviewed all pertinent labs within the past 24 hours.    Estimated Creatinine Clearance: 42.6 mL/min (A) (based on SCr of 1.9 mg/dL (H)).    Diagnostic Results:  I have reviewed and interpreted all pertinent imaging results/findings within the past 24 hours.

## 2019-12-15 NOTE — PROGRESS NOTES
[facility]  Heart Transplant  Progress Note    Patient Name: Deborah Navas  MRN: [id number]  Admission Date: 11/13/2019  Hospital Length of Stay: 30 days  Attending Physician: Jolene Lua MD  Primary Care Provider: Primary Doctor No  Principal Problem:Kidney transplant candidate    Subjective:     Interval History: to go to OR today for OHT    Continuous Infusions:   epinephrine 0.01 mcg/kg/min (12/15/19 0800)    nitric oxide gas       Scheduled Meds:   amiodarone  400 mg Oral BID    aspirin  325 mg Oral Daily    ceFAZolin (ANCEF) IVPB  1 g Intravenous Once    famotidine  20 mg Oral QHS    furosemide  80 mg Intravenous TID    gabapentin  200 mg Oral BID    hydrALAZINE  50 mg Oral Q8H    magnesium oxide  400 mg Oral Daily    methylPREDNISolone sodium succinate  500 mg Intravenous Once    methylPREDNISolone sodium succinate  500 mg Intravenous Once    mexiletine  150 mg Oral Q8H    mirtazapine  15 mg Oral QHS    pantoprazole  40 mg Oral BID AC    polyethylene glycol  17 g Oral Daily    potassium chloride  40 mEq Oral BID    senna-docusate 8.6-50 mg  1 tablet Oral BID    sucralfate  1 g Oral QID (AC & HS)    venlafaxine  150 mg Oral Daily     PRN Meds:sodium chloride, sodium chloride, ALPRAZolam, mupirocin, ondansetron, pneumoc 13-amber conj-dip cr(PF), promethazine, sodium chloride, white petrolatum, zolpidem    Review of patient's allergies indicates:   Allergen Reactions    Adhesive Blisters     Reaction to area in chest and up only    Codeine Itching     Objective:     Vital Signs (Most Recent):  Temp: 98.2 °F (36.8 °C) (12/15/19 0700)  Pulse: 80 (12/15/19 0800)  Resp: (!) 28 (12/15/19 0800)  BP: (!) 80/0 (12/15/19 0700)  SpO2: 100 % (12/15/19 0700) Vital Signs (24h Range):  Temp:  [98 °F (36.7 °C)-98.4 °F (36.9 °C)] 98.2 °F (36.8 °C)  Pulse:  [] 80  Resp:  [14-37] 28  SpO2:  [98 %-100 %] 100 %  BP: (80-90)/(0) 80/0     Patient Vitals for the past 72 hrs  (Last 3 readings):   Weight   12/15/19 0305 101.6 kg (223 lb 15.8 oz)   12/14/19 0300 103.2 kg (227 lb 8.2 oz)   12/13/19 1000 101 kg (222 lb 10.6 oz)     Body mass index is 36.15 kg/m².      Intake/Output Summary (Last 24 hours) at 12/15/2019 0835  Last data filed at 12/15/2019 0800  Gross per 24 hour   Intake 1320.85 ml   Output 6100 ml   Net -4779.15 ml       Hemodynamic Parameters:         Physical Exam   Constitutional: She is oriented to person, place, and time. She appears well-developed and well-nourished.   HENT:   Mouth/Throat: Oropharynx is clear and moist.   Eyes: EOM are normal.   Neck: JVD present.   Cardiovascular: Normal rate, regular rhythm and intact distal pulses.   Smooth VAD hum   Pulmonary/Chest: Effort normal and breath sounds normal. No respiratory distress. She has no rales.   Abdominal: Soft. Bowel sounds are normal. She exhibits no distension. There is no tenderness. There is no guarding.   Musculoskeletal: She exhibits no edema.   Neurological: She is alert and oriented to person, place, and time.   Skin: Skin is warm.   Psychiatric: She has a normal mood and affect.   Nursing note and vitals reviewed.      Significant Labs:  CBC:  Recent Labs   Lab 12/13/19  0400 12/14/19  0403 12/15/19  0315   WBC 7.06 7.97 5.77  5.77   RBC 3.97* 3.95* 3.90*  3.90*   HGB 9.7* 9.6* 9.5*  9.5*   HCT 34.1* 34.0* 32.6*  32.6*    309 289  289   MCV 86 86 84  84   MCH 24.4* 24.3* 24.4*  24.4*   MCHC 28.4* 28.2* 29.1*  29.1*     BNP:  Recent Labs   Lab 12/09/19  0333 12/11/19  0500 12/13/19  0400   * 369* 503*     CMP:  Recent Labs   Lab 12/11/19  0500  12/13/19  0400 12/14/19  0403 12/14/19  1741 12/15/19  0315   *   < > 120* 147* 97 95  95   CALCIUM 9.1   < > 9.4 9.4 9.2 9.5  9.5   ALBUMIN 3.4*  --  3.5  --   --  3.4*   PROT 6.9  --  7.1  --   --  7.1      < > 139 138 139 138  138   K 4.1   < > 4.1 4.8 4.0 3.2*  3.2*   CO2 28   < > 30* 29 31* 33*  33*      < > 97  97 98 93*  93*   BUN 23*   < > 23* 23* 22* 23*  23*   CREATININE 1.8*   < > 2.1* 2.1* 1.9* 1.9*  1.9*   ALKPHOS 87  --  89  --   --  90   ALT 16  --  16  --   --  15   AST 20  --  18  --   --  16   BILITOT 0.4  --  0.4  --   --  0.4    < > = values in this interval not displayed.      Coagulation:   Recent Labs   Lab 12/13/19  0400 12/14/19  0403 12/15/19  0315   INR 2.5* 2.2* 1.7*  1.7*   APTT 35.7* 35.6* 31.2  31.2     LDH:  Recent Labs   Lab 12/13/19  0400 12/14/19  0403 12/15/19  0315    257 246     Microbiology:  Microbiology Results (last 7 days)     Procedure Component Value Units Date/Time    Urine Culture High Risk [[id number]] Collected:  12/15/19 0646    Order Status:  Sent Specimen:  Urine, Catheterized Updated:  12/15/19 0654    Urine culture [[id number]] Collected:  12/15/19 0646    Order Status:  Canceled Specimen:  Urine, Catheterized           I have reviewed all pertinent labs within the past 24 hours.    Estimated Creatinine Clearance: 42.6 mL/min (A) (based on SCr of 1.9 mg/dL (H)).    Diagnostic Results:  I have reviewed and interpreted all pertinent imaging results/findings within the past 24 hours.    Assessment and Plan:     48 yo WF with NICM, s/p HM3 implantation 9/10/19 (post up coarse uncomplicated with the exception of+ diaphragmatic stimulation of LV lead and pleural effusion with chest tube placement, atrial flutter with NADINE/DCCV), V-fib reported in setting of hypokalemia with appropriate shock from ICD on Amiodarone prior to LVAD placement; and recent hospitalizations for ventricular arrythmias; started on Mexilitine.  She was seen in EP clinic today and she continues to have multiple VT episodes with some ATP therapy, although no ICD shocks since starting mexiletine 10/24/2019. One slow VT episode 2.5hrs 11/3/2019. Patient asymptomatic with LVAD. On coumadin. No AFL since post-op event (paced out of rhythm 9/24/2019). CHB with 100% V pacing (LV lead off). She does not feel  well. Dry heaving with mexiletine. Taking phenergan PRN. 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   She was seen in HTS clinic and reported 4 low flow alarms the last 4 nights.  She reports they occur in her sleep and last for only a few seconds.  By the time she wakes up; they quickly stop.  She does report to possibly being little volume overloaded.  She hasn't noticed weight gain at home but thinks she may have little extra fluid.  She denies SOB, chest pain, palpitations, LEGER. In review of her records: she was 223lbs on 10/24/19 during previous hospitalization and is 235lbs now.  Her lasix had previously been decreased to prn.     Nausea  - has intermittent nausea/vomiting, likely multifactorial with RVF and mexiletine  - on BID PPI as well as sucralfate, and H2 blocker   - judicious use of zofran and phenergan due to hx of VT storm    GLO (acute kidney injury)  - see CKD    Organ transplant candidate  - will list with exemption due to VT with ICD shocks and RV failure (will not tolerate ) and does not tolerate NO wean  - approved for transplant on 11/26. Tier 2.     Left ventricular assist device (LVAD) complication  - hx low flow alarms prior to admit, last LFA 12/1  - Possibly secondary to ADHF--> RV failure  - Formal report of CT unremarkable; however, it was a non contrast as creat was elevated above baseline on admit and inflow and outflow cannulas not accurately visualized    Essential hypertension  - Patient is non-pulsatile  - Doppler goal 60-90 mmHg, currently borderline high  - Antihypertensive medications include: hydralazine 25 Q8H      Ventricular tachycardia  - Per EP office visit on day of admission: She continues to have multiple VT episodes with some ATP therapy, although no ICD shocks since starting mexiletine 10/24/2019. One slow VT episode 2.5hrs 11/3/2019. Patient asymptomatic with LVAD. On coumadin. No AFL since post-op event (paced out of rhythm  9/24/2019). CHB with 100% V pacing (LV lead off).   - Plan was to continue current regimen despite Mexiletine making her nauseous.  - Decreased Amiodarone to daily per EP plan on discharge last hospitalization   - Shocked x 3 11/25 for MMVT (in addition has had numerous successful ATP's for MMVT)   - S/P amio load   - now on Amio 400 BID and mexiletine increased to 200  - ICD interrogation 12/10 due to palpitations per Dr Lua- no events noted    LVAD (left ventricular assist device) present  - HeartMate 3 Implanted 9/10/19 as DT.  - Implanted by Dr. Walden  - INR theraputic.  - Antiplatelets:  mg.  - LDH is stable Will monitor daily.   - Speed set at 5100  - Epi @ .01, NO @ 20.   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 Urgent selection 11/26, approved for transplant. Listed tier 2.    Procedure: Device Interrogation Including analysis of device parameters  Current Settings: Ventricular Assist Device  Review of device function is stable    TXP LVAD INTERROGATIONS 12/15/2019 12/15/2019 12/15/2019 12/15/2019 12/15/2019 12/15/2019 12/15/2019   Type HeartMate3 HeartMate3 HeartMate3 HeartMate3 HeartMate3 HeartMate3 HeartMate3   Flow 5.0 4.8 4.8 4.6 5.0 4.9 4.6   Speed 5650 5850 5800 5800 5800 5800 5800   PI 2.0 3.1 3.0 2.9 2.7 2.7 3.4   Power ([place]) 4.3 4.4 4.6 4.6 4.5 4.5 4.5   LSL 5400 5400 5400 5400 5400 5400 5400   Pulsatility Pulse Intermittent pulse No Pulse No Pulse No Pulse No Pulse No Pulse       CKD (chronic kidney disease)  - Baseline creat appears 1.3-1.6.   - Will monitor with diuresis   - KTM consult for GFR evaluation due to Cr remaining higher than baseline and fluctuating.     Acute on chronic combined systolic and diastolic heart failure  - NICM   - IV push lasix today 80 TID   - EPI .02, NO @ 20   - GDMT: hydralazine for BP control   - was on lasix 20 PRN at home PTA  -2g Na dietary restriction, 1500 mL fluid restriction, strict I/Os      ICD (implantable  cardioverter-defibrillator) in place  - [employer]onic ICD  - Shocked x 3 11/25 for MMVT   - Loaded with IV amio on 11/25. Now on amio 400 PO BID and mexiletine increased to 200 QD   - See above VT        ZAC Houston  Heart Transplant  [facility]

## 2019-12-16 PROBLEM — R73.9 STRESS HYPERGLYCEMIA: Status: ACTIVE | Noted: 2019-12-16

## 2019-12-16 PROBLEM — Z94.1 STATUS POST HEART TRANSPLANT: Status: ACTIVE | Noted: 2019-12-16

## 2019-12-16 PROBLEM — T38.0X5S ADVERSE EFFECT OF ADRENAL CORTICAL STEROIDS, SEQUELA: Status: ACTIVE | Noted: 2019-12-16

## 2019-12-16 LAB
ALBUMIN SERPL BCP-MCNC: 2.8 G/DL (ref 3.5–5.2)
ALBUMIN SERPL BCP-MCNC: 3.5 G/DL (ref 3.5–5.2)
ALBUMIN SERPL BCP-MCNC: 3.5 G/DL (ref 3.5–5.2)
ALLENS TEST: ABNORMAL
ALP SERPL-CCNC: 43 U/L (ref 55–135)
ALP SERPL-CCNC: 44 U/L (ref 55–135)
ALP SERPL-CCNC: 87 U/L (ref 55–135)
ALT SERPL W/O P-5'-P-CCNC: 13 U/L (ref 10–44)
ALT SERPL W/O P-5'-P-CCNC: 14 U/L (ref 10–44)
ALT SERPL W/O P-5'-P-CCNC: 20 U/L (ref 10–44)
ANION GAP SERPL CALC-SCNC: 11 MMOL/L (ref 8–16)
ANION GAP SERPL CALC-SCNC: 12 MMOL/L (ref 8–16)
ANION GAP SERPL CALC-SCNC: 18 MMOL/L (ref 8–16)
ANION GAP SERPL CALC-SCNC: 20 MMOL/L (ref 8–16)
ANISOCYTOSIS BLD QL SMEAR: SLIGHT
APTT BLDCRRT: 26.3 SEC (ref 21–32)
APTT BLDCRRT: 27.4 SEC (ref 21–32)
APTT BLDCRRT: 27.4 SEC (ref 21–32)
APTT BLDCRRT: 28.9 SEC (ref 21–32)
APTT BLDCRRT: 32.3 SEC (ref 21–32)
AST SERPL-CCNC: 18 U/L (ref 10–40)
AST SERPL-CCNC: 44 U/L (ref 10–40)
AST SERPL-CCNC: 68 U/L (ref 10–40)
BACTERIA UR CULT: NORMAL
BASOPHILS # BLD AUTO: 0.02 K/UL (ref 0–0.2)
BASOPHILS # BLD AUTO: 0.02 K/UL (ref 0–0.2)
BASOPHILS # BLD AUTO: 0.03 K/UL (ref 0–0.2)
BASOPHILS # BLD AUTO: 0.03 K/UL (ref 0–0.2)
BASOPHILS NFR BLD: 0.1 % (ref 0–1.9)
BASOPHILS NFR BLD: 0.2 % (ref 0–1.9)
BASOPHILS NFR BLD: 0.2 % (ref 0–1.9)
BASOPHILS NFR BLD: 0.5 % (ref 0–1.9)
BILIRUB DIRECT SERPL-MCNC: 0.3 MG/DL (ref 0.1–0.3)
BILIRUB SERPL-MCNC: 0.5 MG/DL (ref 0.1–1)
BILIRUB SERPL-MCNC: 0.6 MG/DL (ref 0.1–1)
BILIRUB SERPL-MCNC: 0.8 MG/DL (ref 0.1–1)
BLD PROD TYP BPU: NORMAL
BLOOD UNIT EXPIRATION DATE: NORMAL
BLOOD UNIT TYPE CODE: 5100
BLOOD UNIT TYPE CODE: 5100
BLOOD UNIT TYPE CODE: 600
BLOOD UNIT TYPE CODE: 6200
BLOOD UNIT TYPE CODE: 9500
BLOOD UNIT TYPE CODE: 9500
BLOOD UNIT TYPE: NORMAL
BNP SERPL-MCNC: 271 PG/ML (ref 0–99)
BUN SERPL-MCNC: 19 MG/DL (ref 6–20)
BUN SERPL-MCNC: 20 MG/DL (ref 6–20)
BUN SERPL-MCNC: 22 MG/DL (ref 6–20)
BUN SERPL-MCNC: 22 MG/DL (ref 6–20)
CALCIUM SERPL-MCNC: 8.6 MG/DL (ref 8.7–10.5)
CALCIUM SERPL-MCNC: 8.7 MG/DL (ref 8.7–10.5)
CALCIUM SERPL-MCNC: 9.1 MG/DL (ref 8.7–10.5)
CALCIUM SERPL-MCNC: 9.2 MG/DL (ref 8.7–10.5)
CHLORIDE SERPL-SCNC: 101 MMOL/L (ref 95–110)
CHLORIDE SERPL-SCNC: 101 MMOL/L (ref 95–110)
CHLORIDE SERPL-SCNC: 103 MMOL/L (ref 95–110)
CHLORIDE SERPL-SCNC: 97 MMOL/L (ref 95–110)
CO2 SERPL-SCNC: 20 MMOL/L (ref 23–29)
CO2 SERPL-SCNC: 21 MMOL/L (ref 23–29)
CO2 SERPL-SCNC: 24 MMOL/L (ref 23–29)
CO2 SERPL-SCNC: 32 MMOL/L (ref 23–29)
CODING SYSTEM: NORMAL
CREAT SERPL-MCNC: 1.7 MG/DL (ref 0.5–1.4)
CREAT SERPL-MCNC: 1.8 MG/DL (ref 0.5–1.4)
CREAT SERPL-MCNC: 2 MG/DL (ref 0.5–1.4)
CREAT SERPL-MCNC: 2 MG/DL (ref 0.5–1.4)
CRP SERPL-MCNC: 17.8 MG/L (ref 0–8.2)
DELSYS: ABNORMAL
DIFFERENTIAL METHOD: ABNORMAL
DISPENSE STATUS: NORMAL
EOSINOPHIL # BLD AUTO: 0 K/UL (ref 0–0.5)
EOSINOPHIL # BLD AUTO: 0 K/UL (ref 0–0.5)
EOSINOPHIL # BLD AUTO: 0.1 K/UL (ref 0–0.5)
EOSINOPHIL # BLD AUTO: 0.2 K/UL (ref 0–0.5)
EOSINOPHIL NFR BLD: 0 % (ref 0–8)
EOSINOPHIL NFR BLD: 0.1 % (ref 0–8)
EOSINOPHIL NFR BLD: 0.5 % (ref 0–8)
EOSINOPHIL NFR BLD: 3.5 % (ref 0–8)
ERYTHROCYTE [DISTWIDTH] IN BLOOD BY AUTOMATED COUNT: 16.6 % (ref 11.5–14.5)
ERYTHROCYTE [DISTWIDTH] IN BLOOD BY AUTOMATED COUNT: 16.6 % (ref 11.5–14.5)
ERYTHROCYTE [DISTWIDTH] IN BLOOD BY AUTOMATED COUNT: 16.7 % (ref 11.5–14.5)
ERYTHROCYTE [DISTWIDTH] IN BLOOD BY AUTOMATED COUNT: 17.2 % (ref 11.5–14.5)
ERYTHROCYTE [SEDIMENTATION RATE] IN BLOOD BY WESTERGREN METHOD: 22 MM/H
EST. GFR  (AFRICAN AMERICAN): 32.8 ML/MIN/1.73 M^2
EST. GFR  (AFRICAN AMERICAN): 32.8 ML/MIN/1.73 M^2
EST. GFR  (AFRICAN AMERICAN): 37.3 ML/MIN/1.73 M^2
EST. GFR  (AFRICAN AMERICAN): 40 ML/MIN/1.73 M^2
EST. GFR  (NON AFRICAN AMERICAN): 28.5 ML/MIN/1.73 M^2
EST. GFR  (NON AFRICAN AMERICAN): 28.5 ML/MIN/1.73 M^2
EST. GFR  (NON AFRICAN AMERICAN): 32.4 ML/MIN/1.73 M^2
EST. GFR  (NON AFRICAN AMERICAN): 34.7 ML/MIN/1.73 M^2
FIBRINOGEN PPP-MCNC: 195 MG/DL (ref 182–366)
FIBRINOGEN PPP-MCNC: 255 MG/DL (ref 182–366)
FIBRINOGEN PPP-MCNC: 304 MG/DL (ref 182–366)
FIBRINOGEN PPP-MCNC: 305 MG/DL (ref 182–366)
FIO2: 0
FIO2: 100
FIO2: 40
FIO2: 60
FIO2: 65
FIO2: 65
FIO2: 80
FIO2: 90
GLUCOSE SERPL-MCNC: 102 MG/DL (ref 70–110)
GLUCOSE SERPL-MCNC: 128 MG/DL (ref 70–110)
GLUCOSE SERPL-MCNC: 140 MG/DL (ref 70–110)
GLUCOSE SERPL-MCNC: 149 MG/DL (ref 70–110)
GLUCOSE SERPL-MCNC: 153 MG/DL (ref 70–110)
GLUCOSE SERPL-MCNC: 155 MG/DL (ref 70–110)
GLUCOSE SERPL-MCNC: 159 MG/DL (ref 70–110)
GLUCOSE SERPL-MCNC: 169 MG/DL (ref 70–110)
GLUCOSE SERPL-MCNC: 172 MG/DL (ref 70–110)
GLUCOSE SERPL-MCNC: 176 MG/DL (ref 70–110)
GLUCOSE SERPL-MCNC: 192 MG/DL (ref 70–110)
GLUCOSE SERPL-MCNC: 221 MG/DL (ref 70–110)
GLUCOSE SERPL-MCNC: 248 MG/DL (ref 70–110)
GLUCOSE SERPL-MCNC: 251 MG/DL (ref 70–110)
GLUCOSE SERPL-MCNC: 267 MG/DL (ref 70–110)
HBV SURFACE AG SERPL QL IA: NEGATIVE
HCO3 UR-SCNC: 21.4 MMOL/L (ref 24–28)
HCO3 UR-SCNC: 22.4 MMOL/L (ref 24–28)
HCO3 UR-SCNC: 23.2 MMOL/L (ref 24–28)
HCO3 UR-SCNC: 24.2 MMOL/L (ref 24–28)
HCO3 UR-SCNC: 24.4 MMOL/L (ref 24–28)
HCO3 UR-SCNC: 25.3 MMOL/L (ref 24–28)
HCO3 UR-SCNC: 25.8 MMOL/L (ref 24–28)
HCO3 UR-SCNC: 25.8 MMOL/L (ref 24–28)
HCO3 UR-SCNC: 27 MMOL/L (ref 24–28)
HCO3 UR-SCNC: 27.1 MMOL/L (ref 24–28)
HCO3 UR-SCNC: 27.3 MMOL/L (ref 24–28)
HCO3 UR-SCNC: 27.3 MMOL/L (ref 24–28)
HCO3 UR-SCNC: 28.3 MMOL/L (ref 24–28)
HCO3 UR-SCNC: 28.6 MMOL/L (ref 24–28)
HCO3 UR-SCNC: 29.9 MMOL/L (ref 24–28)
HCO3 UR-SCNC: 31.9 MMOL/L (ref 24–28)
HCO3 UR-SCNC: 35.7 MMOL/L (ref 24–28)
HCT VFR BLD AUTO: 21.5 % (ref 37–48.5)
HCT VFR BLD AUTO: 25.5 % (ref 37–48.5)
HCT VFR BLD AUTO: 26 % (ref 37–48.5)
HCT VFR BLD AUTO: 33.3 % (ref 37–48.5)
HCT VFR BLD CALC: 17 %PCV (ref 36–54)
HCT VFR BLD CALC: 18 %PCV (ref 36–54)
HCT VFR BLD CALC: 19 %PCV (ref 36–54)
HCT VFR BLD CALC: 20 %PCV (ref 36–54)
HCT VFR BLD CALC: 20 %PCV (ref 36–54)
HCT VFR BLD CALC: 21 %PCV (ref 36–54)
HCT VFR BLD CALC: 23 %PCV (ref 36–54)
HCT VFR BLD CALC: 24 %PCV (ref 36–54)
HCT VFR BLD CALC: 28 %PCV (ref 36–54)
HCT VFR BLD CALC: 31 %PCV (ref 36–54)
HGB BLD-MCNC: 6.2 G/DL (ref 12–16)
HGB BLD-MCNC: 7.7 G/DL (ref 12–16)
HGB BLD-MCNC: 7.7 G/DL (ref 12–16)
HGB BLD-MCNC: 9.8 G/DL (ref 12–16)
HYPOCHROMIA BLD QL SMEAR: ABNORMAL
IMM GRANULOCYTES # BLD AUTO: 0.02 K/UL (ref 0–0.04)
IMM GRANULOCYTES # BLD AUTO: 0.11 K/UL (ref 0–0.04)
IMM GRANULOCYTES # BLD AUTO: 0.24 K/UL (ref 0–0.04)
IMM GRANULOCYTES # BLD AUTO: 0.35 K/UL (ref 0–0.04)
IMM GRANULOCYTES NFR BLD AUTO: 0.3 % (ref 0–0.5)
IMM GRANULOCYTES NFR BLD AUTO: 0.7 % (ref 0–0.5)
IMM GRANULOCYTES NFR BLD AUTO: 1.8 % (ref 0–0.5)
IMM GRANULOCYTES NFR BLD AUTO: 2.3 % (ref 0–0.5)
INR PPP: 1.3 (ref 0.8–1.2)
INR PPP: 1.3 (ref 0.8–1.2)
INR PPP: 1.4 (ref 0.8–1.2)
INR PPP: 1.4 (ref 0.8–1.2)
INR PPP: 1.8 (ref 0.8–1.2)
LDH SERPL L TO P-CCNC: 240 U/L (ref 110–260)
LDH SERPL L TO P-CCNC: 7.08 MMOL/L (ref 0.36–1.25)
LYMPHOCYTES # BLD AUTO: 0.3 K/UL (ref 1–4.8)
LYMPHOCYTES # BLD AUTO: 0.4 K/UL (ref 1–4.8)
LYMPHOCYTES # BLD AUTO: 1 K/UL (ref 1–4.8)
LYMPHOCYTES # BLD AUTO: 1.4 K/UL (ref 1–4.8)
LYMPHOCYTES NFR BLD: 17.2 % (ref 18–48)
LYMPHOCYTES NFR BLD: 2.4 % (ref 18–48)
LYMPHOCYTES NFR BLD: 2.7 % (ref 18–48)
LYMPHOCYTES NFR BLD: 8.5 % (ref 18–48)
MAGNESIUM SERPL-MCNC: 2.4 MG/DL (ref 1.6–2.6)
MAGNESIUM SERPL-MCNC: 2.9 MG/DL (ref 1.6–2.6)
MAGNESIUM SERPL-MCNC: 3.1 MG/DL (ref 1.6–2.6)
MCH RBC QN AUTO: 24.7 PG (ref 27–31)
MCH RBC QN AUTO: 25.9 PG (ref 27–31)
MCH RBC QN AUTO: 26.1 PG (ref 27–31)
MCH RBC QN AUTO: 26.6 PG (ref 27–31)
MCHC RBC AUTO-ENTMCNC: 28.8 G/DL (ref 32–36)
MCHC RBC AUTO-ENTMCNC: 29.4 G/DL (ref 32–36)
MCHC RBC AUTO-ENTMCNC: 29.6 G/DL (ref 32–36)
MCHC RBC AUTO-ENTMCNC: 30.2 G/DL (ref 32–36)
MCV RBC AUTO: 84 FL (ref 82–98)
MCV RBC AUTO: 88 FL (ref 82–98)
MCV RBC AUTO: 88 FL (ref 82–98)
MCV RBC AUTO: 90 FL (ref 82–98)
MODE: ABNORMAL
MONOCYTES # BLD AUTO: 0.5 K/UL (ref 0.3–1)
MONOCYTES # BLD AUTO: 0.6 K/UL (ref 0.3–1)
MONOCYTES # BLD AUTO: 0.7 K/UL (ref 0.3–1)
MONOCYTES # BLD AUTO: 0.8 K/UL (ref 0.3–1)
MONOCYTES NFR BLD: 4.1 % (ref 4–15)
MONOCYTES NFR BLD: 4.2 % (ref 4–15)
MONOCYTES NFR BLD: 5.4 % (ref 4–15)
MONOCYTES NFR BLD: 9.9 % (ref 4–15)
NEUTROPHILS # BLD AUTO: 11.9 K/UL (ref 1.8–7.7)
NEUTROPHILS # BLD AUTO: 13.3 K/UL (ref 1.8–7.7)
NEUTROPHILS # BLD AUTO: 13.9 K/UL (ref 1.8–7.7)
NEUTROPHILS # BLD AUTO: 4 K/UL (ref 1.8–7.7)
NEUTROPHILS NFR BLD: 68.6 % (ref 38–73)
NEUTROPHILS NFR BLD: 85.9 % (ref 38–73)
NEUTROPHILS NFR BLD: 89.4 % (ref 38–73)
NEUTROPHILS NFR BLD: 91.5 % (ref 38–73)
NRBC BLD-RTO: 0 /100 WBC
NUM UNITS TRANS FFP: NORMAL
OVALOCYTES BLD QL SMEAR: ABNORMAL
PCO2 BLDA: 33.7 MMHG (ref 35–45)
PCO2 BLDA: 36.5 MMHG (ref 35–45)
PCO2 BLDA: 37.6 MMHG (ref 35–45)
PCO2 BLDA: 38.3 MMHG (ref 35–45)
PCO2 BLDA: 41.1 MMHG (ref 35–45)
PCO2 BLDA: 41.4 MMHG (ref 35–45)
PCO2 BLDA: 43 MMHG (ref 35–45)
PCO2 BLDA: 43.2 MMHG (ref 35–45)
PCO2 BLDA: 43.4 MMHG (ref 35–45)
PCO2 BLDA: 43.6 MMHG (ref 35–45)
PCO2 BLDA: 45.8 MMHG (ref 35–45)
PCO2 BLDA: 46.3 MMHG (ref 35–45)
PCO2 BLDA: 46.4 MMHG (ref 35–45)
PCO2 BLDA: 47.8 MMHG (ref 35–45)
PCO2 BLDA: 50.1 MMHG (ref 35–45)
PCO2 BLDA: 50.4 MMHG (ref 35–45)
PCO2 BLDA: 51.3 MMHG (ref 35–45)
PEEP: 5
PH SMN: 7.31 [PH] (ref 7.35–7.45)
PH SMN: 7.32 [PH] (ref 7.35–7.45)
PH SMN: 7.35 [PH] (ref 7.35–7.45)
PH SMN: 7.36 [PH] (ref 7.35–7.45)
PH SMN: 7.36 [PH] (ref 7.35–7.45)
PH SMN: 7.38 [PH] (ref 7.35–7.45)
PH SMN: 7.39 [PH] (ref 7.35–7.45)
PH SMN: 7.41 [PH] (ref 7.35–7.45)
PH SMN: 7.43 [PH] (ref 7.35–7.45)
PH SMN: 7.45 [PH] (ref 7.35–7.45)
PH SMN: 7.45 [PH] (ref 7.35–7.45)
PH SMN: 7.47 [PH] (ref 7.35–7.45)
PH SMN: 7.5 [PH] (ref 7.35–7.45)
PHOSPHATE SERPL-MCNC: 2.2 MG/DL (ref 2.7–4.5)
PHOSPHATE SERPL-MCNC: 4.1 MG/DL (ref 2.7–4.5)
PLATELET # BLD AUTO: 120 K/UL (ref 150–350)
PLATELET # BLD AUTO: 122 K/UL (ref 150–350)
PLATELET # BLD AUTO: 292 K/UL (ref 150–350)
PLATELET # BLD AUTO: 83 K/UL (ref 150–350)
PLATELET BLD QL SMEAR: ABNORMAL
PLATELET BLD QL SMEAR: ABNORMAL
PMV BLD AUTO: 10 FL (ref 9.2–12.9)
PMV BLD AUTO: 10.5 FL (ref 9.2–12.9)
PMV BLD AUTO: 9.5 FL (ref 9.2–12.9)
PMV BLD AUTO: 9.7 FL (ref 9.2–12.9)
PO2 BLDA: 150 MMHG (ref 80–100)
PO2 BLDA: 153 MMHG (ref 80–100)
PO2 BLDA: 192 MMHG (ref 80–100)
PO2 BLDA: 25 MMHG (ref 40–60)
PO2 BLDA: 250 MMHG (ref 80–100)
PO2 BLDA: 253 MMHG (ref 80–100)
PO2 BLDA: 311 MMHG (ref 80–100)
PO2 BLDA: 32 MMHG (ref 40–60)
PO2 BLDA: 329 MMHG (ref 80–100)
PO2 BLDA: 343 MMHG (ref 80–100)
PO2 BLDA: 354 MMHG (ref 80–100)
PO2 BLDA: 363 MMHG (ref 80–100)
PO2 BLDA: 37 MMHG (ref 40–60)
PO2 BLDA: 39 MMHG (ref 40–60)
PO2 BLDA: 397 MMHG (ref 80–100)
PO2 BLDA: 429 MMHG (ref 80–100)
PO2 BLDA: 503 MMHG (ref 80–100)
POC BE: -1 MMOL/L
POC BE: -3 MMOL/L
POC BE: -4 MMOL/L
POC BE: 0 MMOL/L
POC BE: 12 MMOL/L
POC BE: 2 MMOL/L
POC BE: 2 MMOL/L
POC BE: 3 MMOL/L
POC BE: 4 MMOL/L
POC BE: 6 MMOL/L
POC BE: 9 MMOL/L
POC IONIZED CALCIUM: 0.91 MMOL/L (ref 1.06–1.42)
POC IONIZED CALCIUM: 0.93 MMOL/L (ref 1.06–1.42)
POC IONIZED CALCIUM: 0.93 MMOL/L (ref 1.06–1.42)
POC IONIZED CALCIUM: 0.95 MMOL/L (ref 1.06–1.42)
POC IONIZED CALCIUM: 0.96 MMOL/L (ref 1.06–1.42)
POC IONIZED CALCIUM: 0.99 MMOL/L (ref 1.06–1.42)
POC IONIZED CALCIUM: 1.08 MMOL/L (ref 1.06–1.42)
POC IONIZED CALCIUM: 1.09 MMOL/L (ref 1.06–1.42)
POC IONIZED CALCIUM: 1.18 MMOL/L (ref 1.06–1.42)
POC IONIZED CALCIUM: 1.18 MMOL/L (ref 1.06–1.42)
POC SATURATED O2: 100 % (ref 95–100)
POC SATURATED O2: 46 % (ref 95–100)
POC SATURATED O2: 59 % (ref 95–100)
POC SATURATED O2: 67 % (ref 95–100)
POC SATURATED O2: 73 % (ref 95–100)
POC SATURATED O2: 99 % (ref 95–100)
POC SATURATED O2: 99 % (ref 95–100)
POC TCO2: 22 MMOL/L (ref 23–27)
POC TCO2: 24 MMOL/L (ref 23–27)
POC TCO2: 24 MMOL/L (ref 23–27)
POC TCO2: 25 MMOL/L (ref 23–27)
POC TCO2: 26 MMOL/L (ref 24–29)
POC TCO2: 27 MMOL/L (ref 23–27)
POC TCO2: 27 MMOL/L (ref 23–27)
POC TCO2: 27 MMOL/L (ref 24–29)
POC TCO2: 28 MMOL/L (ref 23–27)
POC TCO2: 28 MMOL/L (ref 23–27)
POC TCO2: 29 MMOL/L (ref 23–27)
POC TCO2: 29 MMOL/L (ref 24–29)
POC TCO2: 30 MMOL/L (ref 23–27)
POC TCO2: 30 MMOL/L (ref 23–27)
POC TCO2: 31 MMOL/L (ref 23–27)
POC TCO2: 33 MMOL/L (ref 23–27)
POC TCO2: 37 MMOL/L (ref 24–29)
POCT GLUCOSE: 146 MG/DL (ref 70–110)
POCT GLUCOSE: 153 MG/DL (ref 70–110)
POCT GLUCOSE: 164 MG/DL (ref 70–110)
POCT GLUCOSE: 170 MG/DL (ref 70–110)
POCT GLUCOSE: 171 MG/DL (ref 70–110)
POCT GLUCOSE: 174 MG/DL (ref 70–110)
POCT GLUCOSE: 192 MG/DL (ref 70–110)
POCT GLUCOSE: 200 MG/DL (ref 70–110)
POCT GLUCOSE: 207 MG/DL (ref 70–110)
POCT GLUCOSE: 213 MG/DL (ref 70–110)
POCT GLUCOSE: 242 MG/DL (ref 70–110)
POIKILOCYTOSIS BLD QL SMEAR: SLIGHT
POIKILOCYTOSIS BLD QL SMEAR: SLIGHT
POLYCHROMASIA BLD QL SMEAR: ABNORMAL
POTASSIUM BLD-SCNC: 3 MMOL/L (ref 3.5–5.1)
POTASSIUM BLD-SCNC: 3.1 MMOL/L (ref 3.5–5.1)
POTASSIUM BLD-SCNC: 3.4 MMOL/L (ref 3.5–5.1)
POTASSIUM BLD-SCNC: 3.5 MMOL/L (ref 3.5–5.1)
POTASSIUM BLD-SCNC: 3.5 MMOL/L (ref 3.5–5.1)
POTASSIUM BLD-SCNC: 3.7 MMOL/L (ref 3.5–5.1)
POTASSIUM BLD-SCNC: 3.9 MMOL/L (ref 3.5–5.1)
POTASSIUM BLD-SCNC: 3.9 MMOL/L (ref 3.5–5.1)
POTASSIUM SERPL-SCNC: 3.2 MMOL/L (ref 3.5–5.1)
POTASSIUM SERPL-SCNC: 3.4 MMOL/L (ref 3.5–5.1)
POTASSIUM SERPL-SCNC: 3.4 MMOL/L (ref 3.5–5.1)
POTASSIUM SERPL-SCNC: 4.2 MMOL/L (ref 3.5–5.1)
POTASSIUM SERPL-SCNC: 5.4 MMOL/L (ref 3.5–5.1)
PREALB SERPL-MCNC: 27 MG/DL (ref 20–43)
PROT SERPL-MCNC: 4.6 G/DL (ref 6–8.4)
PROT SERPL-MCNC: 5.4 G/DL (ref 6–8.4)
PROT SERPL-MCNC: 7 G/DL (ref 6–8.4)
PROTHROMBIN TIME: 13.1 SEC (ref 9–12.5)
PROTHROMBIN TIME: 13.3 SEC (ref 9–12.5)
PROTHROMBIN TIME: 14.2 SEC (ref 9–12.5)
PROTHROMBIN TIME: 14.3 SEC (ref 9–12.5)
PROTHROMBIN TIME: 17.3 SEC (ref 9–12.5)
RBC # BLD AUTO: 2.39 M/UL (ref 4–5.4)
RBC # BLD AUTO: 2.9 M/UL (ref 4–5.4)
RBC # BLD AUTO: 2.95 M/UL (ref 4–5.4)
RBC # BLD AUTO: 3.96 M/UL (ref 4–5.4)
SAMPLE: ABNORMAL
SITE: ABNORMAL
SODIUM BLD-SCNC: 138 MMOL/L (ref 136–145)
SODIUM BLD-SCNC: 139 MMOL/L (ref 136–145)
SODIUM BLD-SCNC: 141 MMOL/L (ref 136–145)
SODIUM BLD-SCNC: 141 MMOL/L (ref 136–145)
SODIUM BLD-SCNC: 142 MMOL/L (ref 136–145)
SODIUM SERPL-SCNC: 139 MMOL/L (ref 136–145)
SODIUM SERPL-SCNC: 140 MMOL/L (ref 136–145)
SODIUM SERPL-SCNC: 140 MMOL/L (ref 136–145)
SODIUM SERPL-SCNC: 141 MMOL/L (ref 136–145)
SP02: 100
TRANS PLATPHERESIS VOL PATIENT: NORMAL ML
UNIT NUMBER: NORMAL
VT: 400
WBC # BLD AUTO: 13.03 K/UL (ref 3.9–12.7)
WBC # BLD AUTO: 14.9 K/UL (ref 3.9–12.7)
WBC # BLD AUTO: 16.14 K/UL (ref 3.9–12.7)
WBC # BLD AUTO: 5.77 K/UL (ref 3.9–12.7)

## 2019-12-16 PROCEDURE — 93312 ECHO TRANSESOPHAGEAL: CPT | Mod: 26,,, | Performed by: ANESTHESIOLOGY

## 2019-12-16 PROCEDURE — 84100 ASSAY OF PHOSPHORUS: CPT

## 2019-12-16 PROCEDURE — 99900035 HC TECH TIME PER 15 MIN (STAT)

## 2019-12-16 PROCEDURE — 85730 THROMBOPLASTIN TIME PARTIAL: CPT | Mod: 91

## 2019-12-16 PROCEDURE — 33945 TRANSPLANTATION OF HEART: CPT | Mod: ,,, | Performed by: THORACIC SURGERY (CARDIOTHORACIC VASCULAR SURGERY)

## 2019-12-16 PROCEDURE — 25000003 PHARM REV CODE 250: Performed by: UROLOGY

## 2019-12-16 PROCEDURE — 85384 FIBRINOGEN ACTIVITY: CPT | Mod: 91

## 2019-12-16 PROCEDURE — 94761 N-INVAS EAR/PLS OXIMETRY MLT: CPT

## 2019-12-16 PROCEDURE — 93463 DRUG ADMIN & HEMODYNMIC MEAS: CPT | Mod: NTX

## 2019-12-16 PROCEDURE — 83615 LACTATE (LD) (LDH) ENZYME: CPT

## 2019-12-16 PROCEDURE — 27201021 HC LEUKOCYTE FILTER: Mod: NTX

## 2019-12-16 PROCEDURE — 33945 PR TRANSPLANTATION OF HEART: ICD-10-PCS | Mod: ,,, | Performed by: THORACIC SURGERY (CARDIOTHORACIC VASCULAR SURGERY)

## 2019-12-16 PROCEDURE — 76937 CENTRAL LINE: ICD-10-PCS | Mod: 26,,, | Performed by: ANESTHESIOLOGY

## 2019-12-16 PROCEDURE — 94003 VENT MGMT INPAT SUBQ DAY: CPT

## 2019-12-16 PROCEDURE — 93005 ELECTROCARDIOGRAM TRACING: CPT

## 2019-12-16 PROCEDURE — 99291 PR CRITICAL CARE, E/M 30-74 MINUTES: ICD-10-PCS | Mod: ,,, | Performed by: INTERNAL MEDICINE

## 2019-12-16 PROCEDURE — 88307 TISSUE EXAM BY PATHOLOGIST: CPT | Mod: 26,,, | Performed by: PATHOLOGY

## 2019-12-16 PROCEDURE — P9016 RBC LEUKOCYTES REDUCED: HCPCS

## 2019-12-16 PROCEDURE — 85014 HEMATOCRIT: CPT

## 2019-12-16 PROCEDURE — P9017 PLASMA 1 DONOR FRZ W/IN 8 HR: HCPCS

## 2019-12-16 PROCEDURE — 83735 ASSAY OF MAGNESIUM: CPT | Mod: 91

## 2019-12-16 PROCEDURE — 36592 COLLECT BLOOD FROM PICC: CPT | Mod: NTX

## 2019-12-16 PROCEDURE — 33944 PR TRANSPLANT,PREP DONOR HEART: ICD-10-PCS | Mod: 51,,, | Performed by: THORACIC SURGERY (CARDIOTHORACIC VASCULAR SURGERY)

## 2019-12-16 PROCEDURE — P9045 ALBUMIN (HUMAN), 5%, 250 ML: HCPCS | Mod: JG

## 2019-12-16 PROCEDURE — 86140 C-REACTIVE PROTEIN: CPT

## 2019-12-16 PROCEDURE — 82565 ASSAY OF CREATININE: CPT

## 2019-12-16 PROCEDURE — 85025 COMPLETE CBC W/AUTO DIFF WBC: CPT

## 2019-12-16 PROCEDURE — 85610 PROTHROMBIN TIME: CPT | Mod: 91

## 2019-12-16 PROCEDURE — 27201037 HC PRESSURE MONITORING SET UP: Mod: NTX

## 2019-12-16 PROCEDURE — 25000003 PHARM REV CODE 250: Performed by: INTERNAL MEDICINE

## 2019-12-16 PROCEDURE — 85520 HEPARIN ASSAY: CPT | Mod: NTX

## 2019-12-16 PROCEDURE — 27200953 HC CARDIOPLEGIA SYSTEM: Mod: NTX

## 2019-12-16 PROCEDURE — 33980 REMOVE INTRACORPOREAL DEVICE: CPT | Mod: 51,,, | Performed by: THORACIC SURGERY (CARDIOTHORACIC VASCULAR SURGERY)

## 2019-12-16 PROCEDURE — 99223 PR INITIAL HOSPITAL CARE,LEVL III: ICD-10-PCS | Mod: ,,, | Performed by: NURSE PRACTITIONER

## 2019-12-16 PROCEDURE — 25000003 PHARM REV CODE 250: Performed by: THORACIC SURGERY (CARDIOTHORACIC VASCULAR SURGERY)

## 2019-12-16 PROCEDURE — P9035 PLATELET PHERES LEUKOREDUCED: HCPCS

## 2019-12-16 PROCEDURE — P9012 CRYOPRECIPITATE EACH UNIT: HCPCS

## 2019-12-16 PROCEDURE — 27201423 OPTIME MED/SURG SUP & DEVICES STERILE SUPPLY: Performed by: THORACIC SURGERY (CARDIOTHORACIC VASCULAR SURGERY)

## 2019-12-16 PROCEDURE — 27000175 HC ADULT BYPASS PUMP: Mod: NTX

## 2019-12-16 PROCEDURE — 36000930 HC OR TIME LEV VII 1ST 15 MIN: Performed by: THORACIC SURGERY (CARDIOTHORACIC VASCULAR SURGERY)

## 2019-12-16 PROCEDURE — 27000191 HC C-V MONITORING: Mod: NTX

## 2019-12-16 PROCEDURE — 33980 PR REMOVE VENT ASST DEV,IMPLANT,SINGLE VENT: ICD-10-PCS | Mod: 51,,, | Performed by: THORACIC SURGERY (CARDIOTHORACIC VASCULAR SURGERY)

## 2019-12-16 PROCEDURE — P9045 ALBUMIN (HUMAN), 5%, 250 ML: HCPCS | Mod: JG | Performed by: STUDENT IN AN ORGANIZED HEALTH CARE EDUCATION/TRAINING PROGRAM

## 2019-12-16 PROCEDURE — 37000009 HC ANESTHESIA EA ADD 15 MINS: Performed by: THORACIC SURGERY (CARDIOTHORACIC VASCULAR SURGERY)

## 2019-12-16 PROCEDURE — 63600175 PHARM REV CODE 636 W HCPCS: Performed by: SURGERY

## 2019-12-16 PROCEDURE — 82330 ASSAY OF CALCIUM: CPT

## 2019-12-16 PROCEDURE — 99223 1ST HOSP IP/OBS HIGH 75: CPT | Mod: ,,, | Performed by: NURSE PRACTITIONER

## 2019-12-16 PROCEDURE — 36620 INSERTION CATHETER ARTERY: CPT | Mod: 59,,, | Performed by: ANESTHESIOLOGY

## 2019-12-16 PROCEDURE — 84100 ASSAY OF PHOSPHORUS: CPT | Mod: 91

## 2019-12-16 PROCEDURE — 88307 PR  SURG PATH,LEVEL V: ICD-10-PCS | Mod: 26,,, | Performed by: PATHOLOGY

## 2019-12-16 PROCEDURE — 27000221 HC OXYGEN, UP TO 24 HOURS: Mod: NTX

## 2019-12-16 PROCEDURE — 63600367 HC NITRIC OXIDE PER HOUR: Mod: NTX

## 2019-12-16 PROCEDURE — 37799 UNLISTED PX VASCULAR SURGERY: CPT

## 2019-12-16 PROCEDURE — 99291 CRITICAL CARE FIRST HOUR: CPT | Mod: ,,, | Performed by: INTERNAL MEDICINE

## 2019-12-16 PROCEDURE — 33241 REMOVE PULSE GENERATOR: CPT | Mod: 51,,, | Performed by: THORACIC SURGERY (CARDIOTHORACIC VASCULAR SURGERY)

## 2019-12-16 PROCEDURE — 63600175 PHARM REV CODE 636 W HCPCS: Mod: JG

## 2019-12-16 PROCEDURE — D9220A PRA ANESTHESIA: Mod: ,,, | Performed by: ANESTHESIOLOGY

## 2019-12-16 PROCEDURE — 82803 BLOOD GASES ANY COMBINATION: CPT | Mod: NTX

## 2019-12-16 PROCEDURE — 63600175 PHARM REV CODE 636 W HCPCS: Performed by: STUDENT IN AN ORGANIZED HEALTH CARE EDUCATION/TRAINING PROGRAM

## 2019-12-16 PROCEDURE — 27000248 HC VAD-ADDITIONAL DAY: Mod: NTX

## 2019-12-16 PROCEDURE — 63600175 PHARM REV CODE 636 W HCPCS: Mod: NTX | Performed by: INTERNAL MEDICINE

## 2019-12-16 PROCEDURE — 36556 INSERT NON-TUNNEL CV CATH: CPT | Mod: 59,,, | Performed by: ANESTHESIOLOGY

## 2019-12-16 PROCEDURE — 63600175 PHARM REV CODE 636 W HCPCS: Mod: JG | Performed by: STUDENT IN AN ORGANIZED HEALTH CARE EDUCATION/TRAINING PROGRAM

## 2019-12-16 PROCEDURE — 81300006 HC HEART TRANSPORT, GROUND 4-5 HOURS

## 2019-12-16 PROCEDURE — 20000000 HC ICU ROOM

## 2019-12-16 PROCEDURE — 84132 ASSAY OF SERUM POTASSIUM: CPT

## 2019-12-16 PROCEDURE — 83605 ASSAY OF LACTIC ACID: CPT

## 2019-12-16 PROCEDURE — 27201004 HC FEMORAL BYPASS CANNULA: Mod: NTX

## 2019-12-16 PROCEDURE — 85730 THROMBOPLASTIN TIME PARTIAL: CPT

## 2019-12-16 PROCEDURE — 85610 PROTHROMBIN TIME: CPT

## 2019-12-16 PROCEDURE — 37799 UNLISTED PX VASCULAR SURGERY: CPT | Mod: NTX

## 2019-12-16 PROCEDURE — 80076 HEPATIC FUNCTION PANEL: CPT

## 2019-12-16 PROCEDURE — 76937 US GUIDE VASCULAR ACCESS: CPT | Mod: 26,,, | Performed by: ANESTHESIOLOGY

## 2019-12-16 PROCEDURE — 93010 ELECTROCARDIOGRAM REPORT: CPT | Mod: ,,, | Performed by: INTERNAL MEDICINE

## 2019-12-16 PROCEDURE — D9220A PRA ANESTHESIA: ICD-10-PCS | Mod: ,,, | Performed by: ANESTHESIOLOGY

## 2019-12-16 PROCEDURE — 27100088 HC CELL SAVER: Mod: NTX

## 2019-12-16 PROCEDURE — 33241 PR RMV PULSE GENERATOR,SNGL/DUAL: ICD-10-PCS | Mod: 51,,, | Performed by: THORACIC SURGERY (CARDIOTHORACIC VASCULAR SURGERY)

## 2019-12-16 PROCEDURE — 84134 ASSAY OF PREALBUMIN: CPT

## 2019-12-16 PROCEDURE — C1729 CATH, DRAINAGE: HCPCS | Performed by: THORACIC SURGERY (CARDIOTHORACIC VASCULAR SURGERY)

## 2019-12-16 PROCEDURE — 25000003 PHARM REV CODE 250: Performed by: STUDENT IN AN ORGANIZED HEALTH CARE EDUCATION/TRAINING PROGRAM

## 2019-12-16 PROCEDURE — 63600175 PHARM REV CODE 636 W HCPCS: Performed by: UROLOGY

## 2019-12-16 PROCEDURE — 80053 COMPREHEN METABOLIC PANEL: CPT

## 2019-12-16 PROCEDURE — 88309 TISSUE EXAM BY PATHOLOGIST: CPT | Performed by: PATHOLOGY

## 2019-12-16 PROCEDURE — 83880 ASSAY OF NATRIURETIC PEPTIDE: CPT

## 2019-12-16 PROCEDURE — 36620 ARTERIAL: ICD-10-PCS | Mod: 59,,, | Performed by: ANESTHESIOLOGY

## 2019-12-16 PROCEDURE — 25000003 PHARM REV CODE 250: Performed by: SURGERY

## 2019-12-16 PROCEDURE — 80048 BASIC METABOLIC PNL TOTAL CA: CPT

## 2019-12-16 PROCEDURE — 81200000 HC HEART ACQUISITION CHARGE

## 2019-12-16 PROCEDURE — 88307 TISSUE EXAM BY PATHOLOGIST: CPT | Performed by: PATHOLOGY

## 2019-12-16 PROCEDURE — 36000931 HC OR TIME LEV VII EA ADD 15 MIN: Performed by: THORACIC SURGERY (CARDIOTHORACIC VASCULAR SURGERY)

## 2019-12-16 PROCEDURE — 86965 POOLING BLOOD PLATELETS: CPT

## 2019-12-16 PROCEDURE — 63600175 PHARM REV CODE 636 W HCPCS: Performed by: INTERNAL MEDICINE

## 2019-12-16 PROCEDURE — 80048 BASIC METABOLIC PNL TOTAL CA: CPT | Mod: 91

## 2019-12-16 PROCEDURE — 63600367 HC NITRIC OXIDE PER HOUR

## 2019-12-16 PROCEDURE — 82803 BLOOD GASES ANY COMBINATION: CPT

## 2019-12-16 PROCEDURE — 93312 TEE: ICD-10-PCS | Mod: 26,,, | Performed by: ANESTHESIOLOGY

## 2019-12-16 PROCEDURE — 36556 CENTRAL LINE: ICD-10-PCS | Mod: 59,,, | Performed by: ANESTHESIOLOGY

## 2019-12-16 PROCEDURE — 27000445 HC TEMPORARY PACEMAKER LEADS: Mod: NTX

## 2019-12-16 PROCEDURE — 83735 ASSAY OF MAGNESIUM: CPT

## 2019-12-16 PROCEDURE — 93010 EKG 12-LEAD: ICD-10-PCS | Mod: ,,, | Performed by: INTERNAL MEDICINE

## 2019-12-16 PROCEDURE — 37000008 HC ANESTHESIA 1ST 15 MINUTES: Performed by: THORACIC SURGERY (CARDIOTHORACIC VASCULAR SURGERY)

## 2019-12-16 RX ORDER — ASPIRIN 325 MG
325 TABLET ORAL ONCE
Status: COMPLETED | OUTPATIENT
Start: 2019-12-16 | End: 2019-12-16

## 2019-12-16 RX ORDER — TRANEXAMIC ACID 100 MG/ML
INJECTION, SOLUTION INTRAVENOUS
Status: DISCONTINUED | OUTPATIENT
Start: 2019-12-16 | End: 2019-12-16

## 2019-12-16 RX ORDER — PANTOPRAZOLE SODIUM 40 MG/10ML
40 INJECTION, POWDER, LYOPHILIZED, FOR SOLUTION INTRAVENOUS DAILY
Status: DISCONTINUED | OUTPATIENT
Start: 2019-12-17 | End: 2019-12-20

## 2019-12-16 RX ORDER — NYSTATIN 100000 [USP'U]/ML
500000 SUSPENSION ORAL
Status: DISCONTINUED | OUTPATIENT
Start: 2019-12-16 | End: 2019-12-16

## 2019-12-16 RX ORDER — ALBUMIN HUMAN 50 G/1000ML
SOLUTION INTRAVENOUS
Status: COMPLETED
Start: 2019-12-16 | End: 2019-12-16

## 2019-12-16 RX ORDER — ALBUMIN HUMAN 50 G/1000ML
25 SOLUTION INTRAVENOUS ONCE
Status: COMPLETED | OUTPATIENT
Start: 2019-12-16 | End: 2019-12-16

## 2019-12-16 RX ORDER — ACETAMINOPHEN 325 MG/1
650 TABLET ORAL EVERY 4 HOURS PRN
Status: DISCONTINUED | OUTPATIENT
Start: 2019-12-16 | End: 2019-12-20

## 2019-12-16 RX ORDER — NICARDIPINE HYDROCHLORIDE 0.2 MG/ML
5 INJECTION INTRAVENOUS CONTINUOUS
Status: DISCONTINUED | OUTPATIENT
Start: 2019-12-16 | End: 2019-12-19

## 2019-12-16 RX ORDER — FENTANYL CITRATE 50 UG/ML
INJECTION, SOLUTION INTRAMUSCULAR; INTRAVENOUS
Status: DISCONTINUED | OUTPATIENT
Start: 2019-12-16 | End: 2019-12-16

## 2019-12-16 RX ORDER — ROCURONIUM BROMIDE 10 MG/ML
INJECTION, SOLUTION INTRAVENOUS
Status: DISCONTINUED | OUTPATIENT
Start: 2019-12-16 | End: 2019-12-16

## 2019-12-16 RX ORDER — MAGNESIUM SULFATE HEPTAHYDRATE 40 MG/ML
2 INJECTION, SOLUTION INTRAVENOUS
Status: DISCONTINUED | OUTPATIENT
Start: 2019-12-16 | End: 2019-12-20

## 2019-12-16 RX ORDER — PROPOFOL 10 MG/ML
5 INJECTION, EMULSION INTRAVENOUS CONTINUOUS
Status: DISCONTINUED | OUTPATIENT
Start: 2019-12-16 | End: 2019-12-18

## 2019-12-16 RX ORDER — PROTAMINE SULFATE 10 MG/ML
INJECTION, SOLUTION INTRAVENOUS
Status: DISCONTINUED | OUTPATIENT
Start: 2019-12-16 | End: 2019-12-16

## 2019-12-16 RX ORDER — MAGNESIUM SULFATE HEPTAHYDRATE 40 MG/ML
4 INJECTION, SOLUTION INTRAVENOUS
Status: DISCONTINUED | OUTPATIENT
Start: 2019-12-16 | End: 2019-12-20

## 2019-12-16 RX ORDER — BACITRACIN 50000 [IU]/1
INJECTION, POWDER, FOR SOLUTION INTRAMUSCULAR
Status: DISCONTINUED | OUTPATIENT
Start: 2019-12-16 | End: 2019-12-16

## 2019-12-16 RX ORDER — TRANEXAMIC ACID 100 MG/ML
INJECTION, SOLUTION INTRAVENOUS CONTINUOUS PRN
Status: DISCONTINUED | OUTPATIENT
Start: 2019-12-16 | End: 2019-12-16

## 2019-12-16 RX ORDER — ALBUMIN HUMAN 50 G/1000ML
12.5 SOLUTION INTRAVENOUS ONCE
Status: COMPLETED | OUTPATIENT
Start: 2019-12-16 | End: 2019-12-16

## 2019-12-16 RX ORDER — ALBUMIN HUMAN 50 G/1000ML
SOLUTION INTRAVENOUS CONTINUOUS PRN
Status: DISCONTINUED | OUTPATIENT
Start: 2019-12-16 | End: 2019-12-16

## 2019-12-16 RX ORDER — PROPOFOL 10 MG/ML
VIAL (ML) INTRAVENOUS CONTINUOUS PRN
Status: DISCONTINUED | OUTPATIENT
Start: 2019-12-16 | End: 2019-12-16

## 2019-12-16 RX ORDER — POTASSIUM CHLORIDE 29.8 MG/ML
40 INJECTION INTRAVENOUS
Status: DISCONTINUED | OUTPATIENT
Start: 2019-12-16 | End: 2019-12-20

## 2019-12-16 RX ORDER — ONDANSETRON 2 MG/ML
4 INJECTION INTRAMUSCULAR; INTRAVENOUS EVERY 12 HOURS PRN
Status: DISCONTINUED | OUTPATIENT
Start: 2019-12-16 | End: 2019-12-24

## 2019-12-16 RX ORDER — MUPIROCIN 20 MG/G
1 OINTMENT TOPICAL 2 TIMES DAILY
Status: DISPENSED | OUTPATIENT
Start: 2019-12-16 | End: 2019-12-21

## 2019-12-16 RX ORDER — DEXTROSE MONOHYDRATE, SODIUM CHLORIDE, AND POTASSIUM CHLORIDE 50; 1.49; 9 G/1000ML; G/1000ML; G/1000ML
5 INJECTION, SOLUTION INTRAVENOUS CONTINUOUS
Status: DISCONTINUED | OUTPATIENT
Start: 2019-12-16 | End: 2019-12-16

## 2019-12-16 RX ORDER — OXYCODONE HYDROCHLORIDE 10 MG/1
10 TABLET ORAL EVERY 4 HOURS PRN
Status: DISCONTINUED | OUTPATIENT
Start: 2019-12-16 | End: 2019-12-24

## 2019-12-16 RX ORDER — FENTANYL CITRATE 50 UG/ML
25 INJECTION, SOLUTION INTRAMUSCULAR; INTRAVENOUS
Status: DISCONTINUED | OUTPATIENT
Start: 2019-12-16 | End: 2019-12-20

## 2019-12-16 RX ORDER — KETAMINE HCL IN 0.9 % NACL 50 MG/5 ML
SYRINGE (ML) INTRAVENOUS
Status: DISCONTINUED | OUTPATIENT
Start: 2019-12-16 | End: 2019-12-16

## 2019-12-16 RX ORDER — LIDOCAINE HCL/PF 100 MG/5ML
SYRINGE (ML) INTRAVENOUS
Status: DISCONTINUED | OUTPATIENT
Start: 2019-12-16 | End: 2019-12-16

## 2019-12-16 RX ORDER — POTASSIUM CHLORIDE 14.9 MG/ML
INJECTION INTRAVENOUS CONTINUOUS PRN
Status: DISCONTINUED | OUTPATIENT
Start: 2019-12-16 | End: 2019-12-16

## 2019-12-16 RX ORDER — ASPIRIN 325 MG
325 TABLET ORAL DAILY
Status: DISCONTINUED | OUTPATIENT
Start: 2019-12-17 | End: 2019-12-20

## 2019-12-16 RX ORDER — MAGNESIUM SULFATE HEPTAHYDRATE 40 MG/ML
INJECTION, SOLUTION INTRAVENOUS
Status: DISCONTINUED | OUTPATIENT
Start: 2019-12-16 | End: 2019-12-16

## 2019-12-16 RX ORDER — NITROGLYCERIN 5 MG/ML
INJECTION, SOLUTION INTRAVENOUS
Status: DISCONTINUED | OUTPATIENT
Start: 2019-12-16 | End: 2019-12-16

## 2019-12-16 RX ORDER — POTASSIUM CHLORIDE 29.8 MG/ML
80 INJECTION INTRAVENOUS
Status: DISCONTINUED | OUTPATIENT
Start: 2019-12-16 | End: 2019-12-20

## 2019-12-16 RX ORDER — HEPARIN SODIUM 5000 [USP'U]/ML
INJECTION, SOLUTION INTRAVENOUS; SUBCUTANEOUS
Status: DISCONTINUED | OUTPATIENT
Start: 2019-12-16 | End: 2019-12-16

## 2019-12-16 RX ORDER — ONDANSETRON 2 MG/ML
INJECTION INTRAMUSCULAR; INTRAVENOUS
Status: DISCONTINUED | OUTPATIENT
Start: 2019-12-16 | End: 2019-12-16

## 2019-12-16 RX ORDER — BISACODYL 10 MG
10 SUPPOSITORY, RECTAL RECTAL EVERY 12 HOURS PRN
Status: DISCONTINUED | OUTPATIENT
Start: 2019-12-16 | End: 2019-12-24

## 2019-12-16 RX ORDER — MIDAZOLAM HYDROCHLORIDE 1 MG/ML
INJECTION INTRAMUSCULAR; INTRAVENOUS
Status: DISCONTINUED | OUTPATIENT
Start: 2019-12-16 | End: 2019-12-16

## 2019-12-16 RX ORDER — METHYLPREDNISOLONE SOD SUCC 125 MG
125 VIAL (EA) INJECTION EVERY 8 HOURS
Status: DISCONTINUED | OUTPATIENT
Start: 2019-12-16 | End: 2019-12-16

## 2019-12-16 RX ORDER — OXYCODONE HYDROCHLORIDE 5 MG/1
5 TABLET ORAL EVERY 4 HOURS PRN
Status: DISCONTINUED | OUTPATIENT
Start: 2019-12-16 | End: 2019-12-24

## 2019-12-16 RX ORDER — LIDOCAINE HYDROCHLORIDE 20 MG/ML
INJECTION, SOLUTION EPIDURAL; INFILTRATION; INTRACAUDAL; PERINEURAL
Status: DISCONTINUED | OUTPATIENT
Start: 2019-12-16 | End: 2019-12-16

## 2019-12-16 RX ORDER — ETOMIDATE 2 MG/ML
INJECTION INTRAVENOUS
Status: DISCONTINUED | OUTPATIENT
Start: 2019-12-16 | End: 2019-12-16

## 2019-12-16 RX ORDER — POTASSIUM CHLORIDE 14.9 MG/ML
60 INJECTION INTRAVENOUS
Status: DISCONTINUED | OUTPATIENT
Start: 2019-12-16 | End: 2019-12-20

## 2019-12-16 RX ORDER — CEFAZOLIN SODIUM 1 G/3ML
2 INJECTION, POWDER, FOR SOLUTION INTRAMUSCULAR; INTRAVENOUS
Status: COMPLETED | OUTPATIENT
Start: 2019-12-16 | End: 2019-12-17

## 2019-12-16 RX ORDER — ACETAMINOPHEN 325 MG/1
650 TABLET ORAL EVERY 6 HOURS PRN
Status: DISCONTINUED | OUTPATIENT
Start: 2019-12-16 | End: 2019-12-24

## 2019-12-16 RX ORDER — ONDANSETRON 8 MG/1
8 TABLET, ORALLY DISINTEGRATING ORAL EVERY 8 HOURS PRN
Status: DISCONTINUED | OUTPATIENT
Start: 2019-12-16 | End: 2019-12-24

## 2019-12-16 RX ORDER — POLYETHYLENE GLYCOL 3350 17 G/17G
17 POWDER, FOR SOLUTION ORAL DAILY
Status: DISCONTINUED | OUTPATIENT
Start: 2019-12-16 | End: 2019-12-30 | Stop reason: HOSPADM

## 2019-12-16 RX ORDER — NOREPINEPHRINE BITARTRATE/D5W 4MG/250ML
0.02 PLASTIC BAG, INJECTION (ML) INTRAVENOUS CONTINUOUS
Status: DISCONTINUED | OUTPATIENT
Start: 2019-12-16 | End: 2019-12-20

## 2019-12-16 RX ADMIN — SODIUM CHLORIDE, SODIUM GLUCONATE, SODIUM ACETATE, POTASSIUM CHLORIDE, MAGNESIUM CHLORIDE, SODIUM PHOSPHATE, DIBASIC, AND POTASSIUM PHOSPHATE: .53; .5; .37; .037; .03; .012; .00082 INJECTION, SOLUTION INTRAVENOUS at 03:12

## 2019-12-16 RX ADMIN — METHYLPREDNISOLONE SODIUM SUCCINATE 500 MG: 1 INJECTION, POWDER, LYOPHILIZED, FOR SOLUTION INTRAMUSCULAR; INTRAVENOUS at 08:12

## 2019-12-16 RX ADMIN — SODIUM CHLORIDE, SODIUM GLUCONATE, SODIUM ACETATE, POTASSIUM CHLORIDE, MAGNESIUM CHLORIDE, SODIUM PHOSPHATE, DIBASIC, AND POTASSIUM PHOSPHATE: .53; .5; .37; .037; .03; .012; .00082 INJECTION, SOLUTION INTRAVENOUS at 05:12

## 2019-12-16 RX ADMIN — ROCURONIUM BROMIDE 50 MG: 10 INJECTION, SOLUTION INTRAVENOUS at 10:12

## 2019-12-16 RX ADMIN — MIDAZOLAM HYDROCHLORIDE 3 MG: 1 INJECTION, SOLUTION INTRAMUSCULAR; INTRAVENOUS at 11:12

## 2019-12-16 RX ADMIN — Medication 20 MG: at 05:12

## 2019-12-16 RX ADMIN — ROCURONIUM BROMIDE 20 MG: 10 INJECTION, SOLUTION INTRAVENOUS at 05:12

## 2019-12-16 RX ADMIN — ALBUMIN (HUMAN) 25 G: 12.5 SOLUTION INTRAVENOUS at 04:12

## 2019-12-16 RX ADMIN — PROPOFOL 25 MCG/KG/MIN: 10 INJECTION, EMULSION INTRAVENOUS at 10:12

## 2019-12-16 RX ADMIN — LIDOCAINE HYDROCHLORIDE 4 ML: 20 INJECTION, SOLUTION EPIDURAL; INFILTRATION; INTRACAUDAL; PERINEURAL at 04:12

## 2019-12-16 RX ADMIN — FENTANYL CITRATE 100 MCG: 50 INJECTION, SOLUTION INTRAMUSCULAR; INTRAVENOUS at 03:12

## 2019-12-16 RX ADMIN — SODIUM CHLORIDE 6.5 UNITS/HR: 9 INJECTION, SOLUTION INTRAVENOUS at 07:12

## 2019-12-16 RX ADMIN — EPINEPHRINE 0.04 MCG/KG/MIN: 1 INJECTION INTRAMUSCULAR; INTRAVENOUS; SUBCUTANEOUS at 03:12

## 2019-12-16 RX ADMIN — PROTAMINE SULFATE 50 MG: 10 INJECTION, SOLUTION INTRAVENOUS at 10:12

## 2019-12-16 RX ADMIN — NITROGLYCERIN 50 MCG: 5 INJECTION, SOLUTION INTRAVENOUS at 09:12

## 2019-12-16 RX ADMIN — ALBUMIN (HUMAN): 12.5 SOLUTION INTRAVENOUS at 05:12

## 2019-12-16 RX ADMIN — VASOPRESSIN 0.04 UNITS/MIN: 20 INJECTION INTRAVENOUS at 03:12

## 2019-12-16 RX ADMIN — TRANEXAMIC ACID 1000 MG: 100 INJECTION, SOLUTION INTRAVENOUS at 04:12

## 2019-12-16 RX ADMIN — ALBUMIN (HUMAN) 25 G: 12.5 SOLUTION INTRAVENOUS at 03:12

## 2019-12-16 RX ADMIN — PROTAMINE SULFATE 400 MG: 10 INJECTION, SOLUTION INTRAVENOUS at 09:12

## 2019-12-16 RX ADMIN — CALCIUM CHLORIDE 0.5 G: 100 INJECTION, SOLUTION INTRAVENOUS at 10:12

## 2019-12-16 RX ADMIN — MIDAZOLAM HYDROCHLORIDE 2 MG: 1 INJECTION, SOLUTION INTRAMUSCULAR; INTRAVENOUS at 11:12

## 2019-12-16 RX ADMIN — POTASSIUM CHLORIDE: 14.9 INJECTION, SOLUTION INTRAVENOUS at 10:12

## 2019-12-16 RX ADMIN — MUPIROCIN 1 G: 20 OINTMENT TOPICAL at 10:12

## 2019-12-16 RX ADMIN — NOREPINEPHRINE BITARTRATE 0.02 MCG/KG/MIN: 1 INJECTION, SOLUTION, CONCENTRATE INTRAVENOUS at 04:12

## 2019-12-16 RX ADMIN — FENTANYL CITRATE 100 MCG: 50 INJECTION, SOLUTION INTRAMUSCULAR; INTRAVENOUS at 04:12

## 2019-12-16 RX ADMIN — POTASSIUM CHLORIDE 40 MEQ: 400 INJECTION, SOLUTION INTRAVENOUS at 03:12

## 2019-12-16 RX ADMIN — MAGNESIUM SULFATE IN WATER 2 G: 40 INJECTION, SOLUTION INTRAVENOUS at 09:12

## 2019-12-16 RX ADMIN — DEXTROSE: 50 INJECTION, SOLUTION INTRAVENOUS at 07:12

## 2019-12-16 RX ADMIN — MIDAZOLAM HYDROCHLORIDE 5 MG: 1 INJECTION, SOLUTION INTRAMUSCULAR; INTRAVENOUS at 09:12

## 2019-12-16 RX ADMIN — CALCIUM CHLORIDE 0.5 G: 100 INJECTION, SOLUTION INTRAVENOUS at 08:12

## 2019-12-16 RX ADMIN — VASOPRESSIN 0.04 UNITS/MIN: 20 INJECTION INTRAVENOUS at 11:12

## 2019-12-16 RX ADMIN — Medication 30 MG: at 03:12

## 2019-12-16 RX ADMIN — LIDOCAINE HYDROCHLORIDE 4 ML: 20 INJECTION, SOLUTION EPIDURAL; INFILTRATION; INTRACAUDAL; PERINEURAL at 03:12

## 2019-12-16 RX ADMIN — FENTANYL CITRATE 100 MCG: 50 INJECTION, SOLUTION INTRAMUSCULAR; INTRAVENOUS at 11:12

## 2019-12-16 RX ADMIN — SODIUM CHLORIDE 10.5 UNITS/HR: 9 INJECTION, SOLUTION INTRAVENOUS at 04:12

## 2019-12-16 RX ADMIN — HEPARIN SODIUM 29000 UNITS: 5000 INJECTION, SOLUTION INTRAVENOUS; SUBCUTANEOUS at 05:12

## 2019-12-16 RX ADMIN — MIDAZOLAM HYDROCHLORIDE 2 MG: 1 INJECTION, SOLUTION INTRAMUSCULAR; INTRAVENOUS at 03:12

## 2019-12-16 RX ADMIN — ETOMIDATE 10 MG: 2 INJECTION, SOLUTION INTRAVENOUS at 03:12

## 2019-12-16 RX ADMIN — FENTANYL CITRATE 25 MCG: 50 INJECTION INTRAMUSCULAR; INTRAVENOUS at 07:12

## 2019-12-16 RX ADMIN — DEXTROSE 1000 MG: 5 SOLUTION INTRAVENOUS at 08:12

## 2019-12-16 RX ADMIN — CEFAZOLIN 2 G: 1 INJECTION, POWDER, FOR SOLUTION INTRAMUSCULAR; INTRAVENOUS at 03:12

## 2019-12-16 RX ADMIN — MUPIROCIN 1 G: 20 OINTMENT TOPICAL at 01:12

## 2019-12-16 RX ADMIN — ROCURONIUM BROMIDE 100 MG: 10 INJECTION, SOLUTION INTRAVENOUS at 03:12

## 2019-12-16 RX ADMIN — POTASSIUM CHLORIDE, DEXTROSE MONOHYDRATE AND SODIUM CHLORIDE 5 ML/HR: 150; 5; 900 INJECTION, SOLUTION INTRAVENOUS at 01:12

## 2019-12-16 RX ADMIN — PROPOFOL 35 MCG/KG/MIN: 10 INJECTION, EMULSION INTRAVENOUS at 10:12

## 2019-12-16 RX ADMIN — ALBUMIN (HUMAN) 12.5 G: 12.5 INJECTION, SOLUTION INTRAVENOUS at 10:12

## 2019-12-16 RX ADMIN — SODIUM CHLORIDE 5 UNITS/HR: 9 INJECTION, SOLUTION INTRAVENOUS at 06:12

## 2019-12-16 RX ADMIN — CALCIUM CHLORIDE 0.5 G: 100 INJECTION, SOLUTION INTRAVENOUS at 09:12

## 2019-12-16 RX ADMIN — PROPOFOL 35 MCG/KG/MIN: 10 INJECTION, EMULSION INTRAVENOUS at 01:12

## 2019-12-16 RX ADMIN — EPINEPHRINE 0.06 MCG/KG/MIN: 1 INJECTION INTRAMUSCULAR; INTRAVENOUS; SUBCUTANEOUS at 01:12

## 2019-12-16 RX ADMIN — LIDOCAINE HYDROCHLORIDE 50 MG: 20 INJECTION, SOLUTION INTRAVENOUS at 08:12

## 2019-12-16 RX ADMIN — FENTANYL CITRATE 100 MCG: 50 INJECTION, SOLUTION INTRAMUSCULAR; INTRAVENOUS at 10:12

## 2019-12-16 RX ADMIN — CEFAZOLIN 2 G: 1 INJECTION, POWDER, FOR SOLUTION INTRAMUSCULAR; INTRAVENOUS at 11:12

## 2019-12-16 RX ADMIN — METHYLPREDNISOLONE SODIUM SUCCINATE 500 MG: 1 INJECTION, POWDER, LYOPHILIZED, FOR SOLUTION INTRAMUSCULAR; INTRAVENOUS at 06:12

## 2019-12-16 RX ADMIN — SODIUM CHLORIDE 2 G: 9 INJECTION, SOLUTION INTRAVENOUS at 04:12

## 2019-12-16 RX ADMIN — SODIUM CHLORIDE 2 G: 9 INJECTION, SOLUTION INTRAVENOUS at 08:12

## 2019-12-16 RX ADMIN — ROCURONIUM BROMIDE 50 MG: 10 INJECTION, SOLUTION INTRAVENOUS at 04:12

## 2019-12-16 RX ADMIN — TRANEXAMIC ACID 1 MG/KG/HR: 100 INJECTION, SOLUTION INTRAVENOUS at 03:12

## 2019-12-16 RX ADMIN — FENTANYL CITRATE 100 MCG: 50 INJECTION, SOLUTION INTRAMUSCULAR; INTRAVENOUS at 09:12

## 2019-12-16 RX ADMIN — Medication 0.04 MCG/KG/MIN: at 01:12

## 2019-12-16 RX ADMIN — ASPIRIN 325 MG ORAL TABLET 325 MG: 325 PILL ORAL at 03:12

## 2019-12-16 NOTE — TRANSFER OF CARE
"Anesthesia Transfer of Care Note    Patient: Deborah Navas    Procedure(s) Performed: Procedure(s) (LRB):  TRANSPLANT, HEART (N/A)    Patient location: PACU    Anesthesia Type: general    Transport from OR: Transported from OR intubated on 100% O2 by AMBU with assisted ventilation. Continuous ECG monitoring in transport. Continuous SpO2 monitoring in transport. Continuos invasive BP monitoring in transport. Continuous CVP monitoring in transport    Post pain: adequate analgesia    Post assessment: tolerated procedure well and no apparent anesthetic complications    Post vital signs: stable    Level of consciousness: sedated    Nausea/Vomiting: no nausea/vomiting    Complications: none    Transfer of care protocol was followed      Last vitals:   Visit Vitals  BP (!) 84/0 (BP Location: Left arm, Patient Position: Lying)   Pulse 102   Temp 36.4 °C (97.6 °F) (Oral)   Resp (!) 26   Ht 5' 6" (1.676 m)   Wt 100.1 kg (220 lb 10.9 oz)   LMP  (LMP Unknown)   SpO2 100%   Breastfeeding? No   BMI 35.62 kg/m²     "

## 2019-12-16 NOTE — H&P
[facility]  Critical Care - Surgery  History & Physical    Patient Name: Deborah Navas  MRN: [id number]  Admission Date: 11/13/2019  Code Status: Prior  Attending Physician: Talha Nuñez MD   Primary Care Provider: Primary Doctor No   Principal Problem: Kidney transplant candidate    Subjective:     HPI:  49-year-old female with NICM s/p LVAD implantation 09/10/19 who was recently admitted for inotropic infusions and NO after experiencing right heart failure. She is now s/p heart transplant 12/16/19 arriving to SICU intubated on levo, vaso, epi, and NO. Operative course was complicated by entry into RV during dissection requiring peripheral cannulation. Otherwise, post operative course was okay. Received 5 FFP, 3 pRBC, 1 plt, and 1 cryo. Doing well.     Hospital/ICU Course:  No notes on file    Follow-up For: Procedure(s) (LRB):  TRANSPLANT, HEART (N/A)    Post-Operative Day: Day of Surgery     Past Medical History:   Diagnosis Date    Fractures     History of ventricular fibrillation 9/4/2019    History of ventricular tachycardia 9/4/2019    Hyperlipidemia     Migraine     Osteoarthritis        Past Surgical History:   Procedure Laterality Date    BACK SURGERY      2007    BONE GRAFT Left     from Left hip to Left FA    eardrum reconstruction  1980    ELBOW SURGERY Left [id number]    FOREARM FRACTURE SURGERY Bilateral [id number]    multiple surgeries    INSERTION OF GRAFT TO PERICARDIUM  9/10/2019    Procedure: INSERTION, GRAFT, PERICARDIUM;  Surgeon: Shar Walden MD;  Location: [facility] OR [address] [place] [address];  Service: Cardiovascular;;    INSERTION OF IMPLANTABLE CARDIOVERTER-DEFIBRILLATOR (ICD) GENERATOR WITH TWO EXISTING LEADS      INSERTION OF PACEMAKER Left     LEFT VENTRICULAR ASSIST DEVICE N/A 9/10/2019    Procedure: INSERTION-LEFT VENTRICULAR ASSIST DEVICE;  Surgeon: Shar Walden MD;  Location: [facility] OR [address] [place] [address];  Service: Cardiovascular;  Laterality: N/A;  DT 3      LUMBAR FUSION  2007    L4-L5    RIGHT HEART CATHETERIZATION Right 9/4/2019    Procedure: INSERTION, CATHETER, RIGHT HEART;  Surgeon: Vi Bryant MD;  Location: [facility] CATH LAB;  Service: Cardiology;  Laterality: Right;    RIGHT HEART CATHETERIZATION N/A 11/18/2019    Procedure: INSERTION, CATHETER, RIGHT HEART;  Surgeon: Eric Antunez Jr., MD;  Location: [facility] CATH LAB;  Service: Cardiology;  Laterality: N/A;    SINUS SURGERY Right 1994    with lymph nodes    STERNAL WOUND CLOSURE  9/10/2019    Procedure: CLOSURE, WOUND, STERNUM;  Surgeon: Shar Walden MD;  Location: [facility] OR [facility] FLR;  Service: Cardiovascular;;    TEMPOROMANDIBULAR JOINT SURGERY Right 1988    TONSILLECTOMY      TREATMENT OF CARDIAC ARRHYTHMIA N/A 9/24/2019    Procedure: CARDIOVERSION;  Surgeon: Moe Barrera MD;  Location: [facility] EP LAB;  Service: Cardiology;  Laterality: N/A;  AF, DCCV/NADINE, anes, DM, Rm 3073    TYMPANOSTOMY TUBE PLACEMENT  1971- 1979    multiple tube placements       Review of patient's allergies indicates:   Allergen Reactions    Adhesive Blisters     Reaction to area in chest and up only    Codeine Itching       Family History     Problem Relation (Age of Onset)    Broken bones Maternal Grandmother    Cancer Paternal Grandmother    Dislocations Maternal Grandmother    Heart failure Paternal Grandfather, Maternal Grandfather    Migraines Mother, Maternal Grandmother, Paternal Grandmother, Paternal Grandfather, Maternal Grandfather    Obesity Paternal Grandmother    Osteoarthritis Mother, Maternal Grandmother, Paternal Grandmother, Paternal Grandfather, Maternal Grandfather, Father    Osteoporosis Maternal Grandmother    Scoliosis Maternal Grandmother    Stroke Father        Tobacco Use    Smoking status: Never Smoker    Smokeless tobacco: Never Used   Substance and Sexual Activity    Alcohol use: No    Drug use: No    Sexual activity: Not Currently      Review of Systems   Unable to perform ROS: Intubated      Objective:     Vital Signs (Most Recent):  Temp: 97.7 °F (36.5 °C) (12/16/19 1500)  Pulse: (!) 111 (12/16/19 1615)  Resp: (!) 22 (12/16/19 1615)  BP: (!) 96/54 (12/16/19 1600)  SpO2: 100 % (12/16/19 1615) Vital Signs (24h Range):  Temp:  [97.6 °F (36.4 °C)-98.4 °F (36.9 °C)] 97.7 °F (36.5 °C)  Pulse:  [] 111  Resp:  [16-57] 22  SpO2:  [98 %-100 %] 100 %  BP: ()/(0-73) 96/54  Arterial Line BP: ()/(49-64) 108/57     Weight: 100.1 kg (220 lb 10.9 oz)  Body mass index is 35.62 kg/m².      Intake/Output Summary (Last 24 hours) at 12/16/2019 1648  Last data filed at 12/16/2019 1617  Gross per 24 hour   Intake 7877.86 ml   Output 2035 ml   Net 5842.86 ml       Physical Exam   Constitutional: She appears well-developed and well-nourished. No distress.   HENT:   Head: Normocephalic and atraumatic.   Eyes: Conjunctivae are normal.   Neck: Neck supple.   Cardiovascular:   Atrial paced at 110.    Pulmonary/Chest: Effort normal.   Chest tubes intact. Serosanguinous drainage. Pacing wires in place.    Abdominal: Soft. There is no tenderness.   Musculoskeletal: She exhibits no deformity.   Right femoral access site covered with dressing. No issues.    Neurological: She is alert.   Skin: Skin is warm and dry.   Psychiatric:   Unable to assess.    Nursing note and vitals reviewed.      Vents:  Vent Mode: A/C (12/16/19 1306)  Set Rate: 22 bmp (12/16/19 1306)  Vt Set: 400 mL (12/16/19 1306)  PEEP/CPAP: 5 cmH20 (12/16/19 1306)  Oxygen Concentration (%): 40 (12/16/19 1615)  Peak Airway Pressure: 25 cmH2O (12/16/19 1306)  Plateau Pressure: 0 cmH20 (12/16/19 1306)  Total Ve: 10.5 mL (12/16/19 1306)  F/VT Ratio<105 (RSBI): (!) 65 (12/16/19 1306)    Lines/Drains/Airways     Central Venous Catheter Line                 Percutaneous Central Line Insertion/Assessment - triple lumen  12/05/19 1502 left internal jugular 11 days         Percutaneous Central Line Insertion/Assessment - double lumen  12/16/19 0325 less than 1  day          Drain                 Urethral Catheter 12/16/19 0332 Temperature probe 14 Fr. less than 1 day         Y Chest Tube 1 and 2 12/16/19 1027 1 Right Mediastinal 19 Fr. 2 Left Mediastinal 19 Fr. less than 1 day         Y Chest Tube 3 and 4 12/16/19 1030 3 Right Pleural 19 Fr. 4 Left Pleural 19 Fr. less than 1 day          Airway                 Airway - Non-Surgical 12/16/19 0327 Endotracheal Tube less than 1 day          Arterial Line                 Arterial Line 12/16/19 0300 Left Other (Comment) less than 1 day          Line                 Pacer Wires 12/16/19 1004 less than 1 day          Peripheral Intravenous Line                 Midline Catheter Insertion/Assessment  - Single Lumen 12/04/19 1552 Right cephalic vein (lateral side of arm) 18g x 8cm 12 days                Significant Labs:    CBC/Anemia Profile:  Recent Labs   Lab 12/16/19  1015 12/16/19  1233 12/16/19  1234 12/16/19  1413   WBC 16.14*  --  14.90* 13.03*   HGB 6.2*  --  7.7* 7.7*   HCT 21.5* 23* 26.0* 25.5*   PLT 83*  --  120* 122*   MCV 90  --  88 88   RDW 16.6*  --  16.6* 16.7*        Chemistries:  Recent Labs   Lab 12/15/19  0315  12/16/19  0158 12/16/19  1015 12/16/19  1234     138   < > 140 141 140   K 3.2*  3.2*   < > 4.2 3.2* 3.4*  3.4*   CL 93*  93*   < > 97 101 101   CO2 33*  33*   < > 32* 20* 21*   BUN 23*  23*   < > 22* 20 19   CREATININE 1.9*  1.9*   < > 2.0* 1.8* 1.7*   CALCIUM 9.5  9.5   < > 9.2 8.6* 8.7   ALBUMIN 3.4*  --  3.5 2.8*  --    PROT 7.1  --  7.0 4.6*  --    BILITOT 0.4  --  0.5 0.6  --    ALKPHOS 90  --  87 44*  --    ALT 15  --  14 13  --    AST 16  --  18 44*  --    MG 2.2  --  2.4  --  3.1*   PHOS 3.8  --  4.1  --  2.2*    < > = values in this interval not displayed.       Significant Imaging: I have reviewed all pertinent imaging results/findings within the past 24 hours.    Assessment/Plan:     Heart transplant candidate  49-year-old female with NICM s/p LVAD September 2019 now s/p heart  transplant 12/16/19. Arrived intubated on levo, vaso, epi, and NO. Doing well.     Neuro:   -Fentanyl for pain  -propofol for sedation while intubated  -full neuro exam     Cards:  -monitor hemodynamics  -monitor chest tube output, high initially but slowing down  -wean vaso, levo, and NO as tolerated  -MAP goal 60-80  -Can have up to 1.5L albumin first 12 hours    Resp:  -wean to extubate tomorrow morning  -follow up chest x-ray  -ABG as needed    Renal:  -monitor urine output  -trend BUN and creatinine    Heme:  -trend H/H  -monitor chest tube output    FENGI:  -NPO for now  -replace lytes as needed  -GI ppx    Endo:  -monitor POC glucose  -insulin gtt for time being    ID:  -monitor WBC  -perioperative abx    Dispo: continue ICU care. Extubate tomorrow.                Critical care was time spent personally by me on the following activities: development of treatment plan with patient or surrogate and bedside caregivers, discussions with consultants, evaluation of patient's response to treatment, examination of patient, ordering and performing treatments and interventions, ordering and review of laboratory studies, ordering and review of radiographic studies, pulse oximetry, re-evaluation of patient's condition.  This critical care time did not overlap with that of any other provider or involve time for any procedures.     Toby Dougherty MD  Critical Care - Surgery  [facility]

## 2019-12-16 NOTE — SUBJECTIVE & OBJECTIVE
Follow-up For: Procedure(s) (LRB):  TRANSPLANT, HEART (N/A)    Post-Operative Day: Day of Surgery     Past Medical History:   Diagnosis Date    Fractures     History of ventricular fibrillation 9/4/2019    History of ventricular tachycardia 9/4/2019    Hyperlipidemia     Migraine     Osteoarthritis        Past Surgical History:   Procedure Laterality Date    BACK SURGERY      2007    BONE GRAFT Left     from Left hip to Left FA    eardrum reconstruction  1980    ELBOW SURGERY Left [id number]    FOREARM FRACTURE SURGERY Bilateral [id number]    multiple surgeries    INSERTION OF GRAFT TO PERICARDIUM  9/10/2019    Procedure: INSERTION, GRAFT, PERICARDIUM;  Surgeon: Shar Walden MD;  Location: [facility] OR [address] [place] [address];  Service: Cardiovascular;;    INSERTION OF IMPLANTABLE CARDIOVERTER-DEFIBRILLATOR (ICD) GENERATOR WITH TWO EXISTING LEADS      INSERTION OF PACEMAKER Left     LEFT VENTRICULAR ASSIST DEVICE N/A 9/10/2019    Procedure: INSERTION-LEFT VENTRICULAR ASSIST DEVICE;  Surgeon: Shar Walden MD;  Location: [facility] OR [address] [place] [address];  Service: Cardiovascular;  Laterality: N/A;  DT HM3     LUMBAR FUSION  2007    L4-L5    RIGHT HEART CATHETERIZATION Right 9/4/2019    Procedure: INSERTION, CATHETER, RIGHT HEART;  Surgeon: Vi Bryant MD;  Location: [facility] CATH LAB;  Service: Cardiology;  Laterality: Right;    RIGHT HEART CATHETERIZATION N/A 11/18/2019    Procedure: INSERTION, CATHETER, RIGHT HEART;  Surgeon: Eric Antunez Jr., MD;  Location: [facility] CATH LAB;  Service: Cardiology;  Laterality: N/A;    SINUS SURGERY Right 1994    with lymph nodes    STERNAL WOUND CLOSURE  9/10/2019    Procedure: CLOSURE, WOUND, STERNUM;  Surgeon: Shar Walden MD;  Location: [facility] OR [address] [place] [address];  Service: Cardiovascular;;    TEMPOROMANDIBULAR JOINT SURGERY Right 1988    TONSILLECTOMY      TREATMENT OF CARDIAC ARRHYTHMIA N/A 9/24/2019    Procedure: CARDIOVERSION;  Surgeon: Moe Barrera MD;  Location: [facility] EP LAB;  Service:  Cardiology;  Laterality: N/A;  AF, DCCV/NADINE, anes, DM, Rm 3073    TYMPANOSTOMY TUBE PLACEMENT  1971- 1979    multiple tube placements       Review of patient's allergies indicates:   Allergen Reactions    Adhesive Blisters     Reaction to area in chest and up only    Codeine Itching       Family History     Problem Relation (Age of Onset)    Broken bones Maternal Grandmother    Cancer Paternal Grandmother    Dislocations Maternal Grandmother    Heart failure Paternal Grandfather, Maternal Grandfather    Migraines Mother, Maternal Grandmother, Paternal Grandmother, Paternal Grandfather, Maternal Grandfather    Obesity Paternal Grandmother    Osteoarthritis Mother, Maternal Grandmother, Paternal Grandmother, Paternal Grandfather, Maternal Grandfather, Father    Osteoporosis Maternal Grandmother    Scoliosis Maternal Grandmother    Stroke Father        Tobacco Use    Smoking status: Never Smoker    Smokeless tobacco: Never Used   Substance and Sexual Activity    Alcohol use: No    Drug use: No    Sexual activity: Not Currently      Review of Systems   Unable to perform ROS: Intubated     Objective:     Vital Signs (Most Recent):  Temp: 97.7 °F (36.5 °C) (12/16/19 1500)  Pulse: (!) 111 (12/16/19 1615)  Resp: (!) 22 (12/16/19 1615)  BP: (!) 96/54 (12/16/19 1600)  SpO2: 100 % (12/16/19 1615) Vital Signs (24h Range):  Temp:  [97.6 °F (36.4 °C)-98.4 °F (36.9 °C)] 97.7 °F (36.5 °C)  Pulse:  [] 111  Resp:  [16-57] 22  SpO2:  [98 %-100 %] 100 %  BP: ()/(0-73) 96/54  Arterial Line BP: ()/(49-64) 108/57     Weight: 100.1 kg (220 lb 10.9 oz)  Body mass index is 35.62 kg/m².      Intake/Output Summary (Last 24 hours) at 12/16/2019 1648  Last data filed at 12/16/2019 1617  Gross per 24 hour   Intake 7877.86 ml   Output 2035 ml   Net 5842.86 ml       Physical Exam   Constitutional: She appears well-developed and well-nourished. No distress.   HENT:   Head: Normocephalic and atraumatic.   Eyes: Conjunctivae  are normal.   Neck: Neck supple.   Cardiovascular:   Atrial paced at 110.    Pulmonary/Chest: Effort normal.   Chest tubes intact. Serosanguinous drainage. Pacing wires in place.    Abdominal: Soft. There is no tenderness.   Musculoskeletal: She exhibits no deformity.   Right femoral access site covered with dressing. No issues.    Neurological: She is alert.   Skin: Skin is warm and dry.   Psychiatric:   Unable to assess.    Nursing note and vitals reviewed.      Vents:  Vent Mode: A/C (12/16/19 1306)  Set Rate: 22 bmp (12/16/19 1306)  Vt Set: 400 mL (12/16/19 1306)  PEEP/CPAP: 5 cmH20 (12/16/19 1306)  Oxygen Concentration (%): 40 (12/16/19 1615)  Peak Airway Pressure: 25 cmH2O (12/16/19 1306)  Plateau Pressure: 0 cmH20 (12/16/19 1306)  Total Ve: 10.5 mL (12/16/19 1306)  F/VT Ratio<105 (RSBI): (!) 65 (12/16/19 1306)    Lines/Drains/Airways     Central Venous Catheter Line                 Percutaneous Central Line Insertion/Assessment - triple lumen  12/05/19 1502 left internal jugular 11 days         Percutaneous Central Line Insertion/Assessment - double lumen  12/16/19 0325 less than 1 day          Drain                 Urethral Catheter 12/16/19 0332 Temperature probe 14 Fr. less than 1 day         Y Chest Tube 1 and 2 12/16/19 1027 1 Right Mediastinal 19 Fr. 2 Left Mediastinal 19 Fr. less than 1 day         Y Chest Tube 3 and 4 12/16/19 1030 3 Right Pleural 19 Fr. 4 Left Pleural 19 Fr. less than 1 day          Airway                 Airway - Non-Surgical 12/16/19 0327 Endotracheal Tube less than 1 day          Arterial Line                 Arterial Line 12/16/19 0300 Left Other (Comment) less than 1 day          Line                 Pacer Wires 12/16/19 1004 less than 1 day          Peripheral Intravenous Line                 Midline Catheter Insertion/Assessment  - Single Lumen 12/04/19 1552 Right cephalic vein (lateral side of arm) 18g x 8cm 12 days                Significant Labs:    CBC/Anemia  Profile:  Recent Labs   Lab 12/16/19  1015 12/16/19  1233 12/16/19  1234 12/16/19  1413   WBC 16.14*  --  14.90* 13.03*   HGB 6.2*  --  7.7* 7.7*   HCT 21.5* 23* 26.0* 25.5*   PLT 83*  --  120* 122*   MCV 90  --  88 88   RDW 16.6*  --  16.6* 16.7*        Chemistries:  Recent Labs   Lab 12/15/19  0315  12/16/19  0158 12/16/19  1015 12/16/19  1234     138   < > 140 141 140   K 3.2*  3.2*   < > 4.2 3.2* 3.4*  3.4*   CL 93*  93*   < > 97 101 101   CO2 33*  33*   < > 32* 20* 21*   BUN 23*  23*   < > 22* 20 19   CREATININE 1.9*  1.9*   < > 2.0* 1.8* 1.7*   CALCIUM 9.5  9.5   < > 9.2 8.6* 8.7   ALBUMIN 3.4*  --  3.5 2.8*  --    PROT 7.1  --  7.0 4.6*  --    BILITOT 0.4  --  0.5 0.6  --    ALKPHOS 90  --  87 44*  --    ALT 15  --  14 13  --    AST 16  --  18 44*  --    MG 2.2  --  2.4  --  3.1*   PHOS 3.8  --  4.1  --  2.2*    < > = values in this interval not displayed.       Significant Imaging: I have reviewed all pertinent imaging results/findings within the past 24 hours.

## 2019-12-16 NOTE — ANESTHESIA PROCEDURE NOTES
Central Line    Diagnosis: Heart transplant   Patient location during procedure: done in OR  Procedure start time: 12/16/2019 3:25 AM  Timeout: 12/16/2019 3:25 AM  Procedure end time: 12/16/2019 3:35 AM    Staffing  Authorizing Provider: Crow Luu MD  Performing Provider: Joaquin Garrison MD    Staffing  Anesthesiologist: Crow Luu MD  Other anesthesia staff: Joaquin Garrison MD  Performed: other anesthesia staff   Anesthesiologist was present at the time of the procedure.  Preanesthetic Checklist  Completed: patient identified, site marked, surgical consent, pre-op evaluation, timeout performed, IV checked, risks and benefits discussed, monitors and equipment checked and anesthesia consent given  Indication   Indication: hemodynamic monitoring, vascular access, med administration     Anesthesia   general anesthesia    Central Line   Skin Prep: skin prepped with ChloraPrep, skin prep agent completely dried prior to procedure  maximum sterile barriers used during central venous catheter insertion  hand hygiene performed prior to central venous catheter insertion  Location: left, internal jugular.   Catheter type: double lumen  Catheter Size: 12 Fr  Ultrasound: vascular probe with ultrasound  Vessel Caliber: patent  Needle advanced into vessel with real time Ultrasound guidance.  Image recorded and saved.   Manometry: Venous cannualation confirmed by visual estimation of blood vessel pressure using manometry.  Insertion Attempts: 1   Securement:line sutured, chlorhexidine patch, sterile dressing applied and blood return through all ports    Post-Procedure   Adverse Events:none    Guidewire Guidewire removed intact. Guidewire removed intact, verified with nurse.

## 2019-12-16 NOTE — PROGRESS NOTES
Pt arrived to ICU [id number] per anesthesia, connected to ventilator and bedside tele monitor. Dr. Dougherty @ bedside.  VSS. All new orders received and carried out. See flowsheet for full assessment.

## 2019-12-16 NOTE — PROGRESS NOTES
12/16/2019  Terra Porter    Current provider:  Jolene Lua MD      I, Terra Porter, rounded on Deborah Oliva Navas to ensure all mechanical assist device settings (IABP or VAD) were appropriate and all parameters were within limits.  I was able to ensure all back up equipment was present, the staff had no issues, and the Perfusion Department daily rounding was complete.    Patient in OR for Heart Transplant!    3:39 AM

## 2019-12-16 NOTE — ANESTHESIA PROCEDURE NOTES
Arterial    Diagnosis: heart transplant     Patient location during procedure: done in OR  Procedure start time: 12/16/2019 3:00 AM  Timeout: 12/16/2019 3:00 AM  Procedure end time: 12/16/2019 3:09 AM    Staffing  Authorizing Provider: Crow Luu MD  Performing Provider: Joaquin Garrison MD    Anesthesiologist was present at the time of the procedure.    Preanesthetic Checklist  Completed: patient identified, site marked, surgical consent, pre-op evaluation, timeout performed, IV checked, risks and benefits discussed, monitors and equipment checked and anesthesia consent givenArterial  Skin Prep: alcohol swabs  Local Infiltration: lidocaine  Orientation: left  Location: brachial  Catheter Size: 20 G  Catheter placement by Ultrasound guidance. Heme positive aspiration all ports.  Vessel Caliber: patent  Needle advanced into vessel with real time Ultrasound guidance.Insertion Attempts: 2  Assessment  Dressing: secured with tape and tegaderm and sutured in place and taped  Patient: Tolerated well

## 2019-12-16 NOTE — ASSESSMENT & PLAN NOTE
49-year-old female with NICM s/p LVAD September 2019 now s/p heart transplant 12/16/19. Arrived intubated on levo, vaso, epi, and NO. Doing well.     Neuro:   -Fentanyl for pain  -propofol for sedation while intubated  -full neuro exam     Cards:  -monitor hemodynamics  -monitor chest tube output, high initially but slowing down  -wean vaso, levo, and NO as tolerated  -MAP goal 60-80  -Can have up to 1.5L albumin first 12 hours    Resp:  -wean to extubate tomorrow morning  -follow up chest x-ray  -ABG as needed    Renal:  -monitor urine output  -trend BUN and creatinine    Heme:  -trend H/H  -monitor chest tube output    FENGI:  -NPO for now  -replace lytes as needed  -GI ppx    Endo:  -monitor POC glucose  -insulin gtt for time being    ID:  -monitor WBC  -perioperative abx    Dispo: continue ICU care. Extubate tomorrow.

## 2019-12-16 NOTE — OP NOTE
DATE OF PROCEDURE:  12/16/2019    ATTENDING SURGEON:  Talha Nuñez M.D.    ASSISTANT:  Frankie Rush M.D. (Cardiothoracic resident).    PREOPERATIVE DIAGNOSES:  1.  Nonischemic cardiomyopathy.  2.  Status post left ventricular assist device placement (HeartMate III) in   September 2019.  3.  Atrial flutter.  4.  Ventricular fibrillation.  5.  Obesity.  6.  Acute kidney injury.  7.  Hypertension.    POSTOPERATIVE DIAGNOSES:  1.  Nonischemic cardiomyopathy.  2.  Status post left ventricular assist device placement (HeartMate III) in   September 2019.  3.  Atrial flutter.  4.  Ventricular fibrillation.  5.  Obesity.  6.  Acute kidney injury.  7.  Hypertension.    OPERATIONS PERFORMED:  1.  Orthotopic heart transplant using bicaval technique, ischemic time 6 hours   and 8 minutes.  2.  Removal of implantable intracorporeal left ventricular assist device.  3.  Removal of AICD.  4.  Backbench preparation of donor heart for implant.    ANESTHESIA:  General endotracheal.    ESTIMATED BLOOD LOSS:  100 mL.    BRIEF HISTORY:  Ms. Navas is a 49-year-old woman with nonischemic   cardiomyopathy who initially underwent left ventricular assist device placement   in September of this year for advanced heart failure.  She had nonischemic   cardiomyopathy.  Her initial implant was uneventful, and she was ultimately   discharged to home.  Unfortunately, she developed recurrent ventricular and   atrial arrhythmias.  She also had progressive right ventricular failure.  She   was admitted to the hospital and had been placed on the list for   transplantation.  An organ has become available and she now presents for   orthotopic heart transplant.    PROCEDURE IN DETAIL:  After obtaining informed and written consent, the patient   was brought to the Operating Room and placed on the operating table in supine   position.  After induction of adequate general endotracheal anesthesia, the   patient's chest, abdomen, pelvis and  bilateral lower extremities were prepped   and draped in the usual sterile fashion.  A wire was placed in the right common   femoral artery using Seldinger technique.  Its position in the descending aorta   was confirmed using NADINE.  We then turned our attention to the patient's chest,   where the previous upper midline skin incision was opened.  Dissection was   carried down to the level of the sternum.  Of note, the skin and subcutaneous   tissues were markedly edematous, and were clearly still in the inflammatory   phase of wound healing.  Once we reached the anterior table of the sternum, the   wires were divided, but not removed.  The oscillating saw was then used to   divide the anterior table of the sternum.  The wires were removed, and the   posterior table of the sternum was divided using straight Fortune scissors.  Once   the sternum had been safely traversed, the mammary retractor was placed and the   retrosternal adhesions were taken down on the left using the electrocautery   device.  During the course of this dissection, the anterior surface of the left   ventricular assist device was also exposed, as well as the majority of the   driveline.  The left pleural space was entered, but the pleural space was   obliterated by adhesions.  The mammary retractor was then placed on the right   and the right-sided adhesions were taken down, and the right pleural space was   entered.  The right pleural space was free of adhesions.  The sternal retractor   was then placed and we began intrapericardial portion of the dissection.  As had   been seen at the time the skin incision, the patient was still in the   inflammatory phase of wound healing.  There was significant tissue edema, and   very dense adhesions.  There were no anatomical landmarks.  We were able to   mobilize the outflow graft from the LVAD, and worked to expose the diaphragmatic   surface of the heart.  Significant venous bleeding was encountered.  This  was   controlled with digital pressure.  I felt that given the poor exposure and   active bleeding, that the safest course was to place the patient on   cardiopulmonary bypass.  Since she was hemodynamically stable, we were able to   perform a groin cutdown rather than cannulate percutaneously.  The femoral   artery and vein were exposed, and 5-0 Prolene pursestrings were placed in the   anterior surface of each.  The patient was systemically heparinized.  The   Prolene in the femoral artery had been placed around the wire.  Serial dilation   was then performed and the arterial cannula was inserted and connected to the   heart-lung machine circuit after de-airing.  A wire was placed into the right   common femoral vein through the pursestring using Seldinger technique.  It was   advanced to the level of the right atrium.  The small dilator was placed over   the wire and advanced.  The soft wire was then removed and the Amplatz Super   Stiff wire was placed through the dilator.  Serial dilation was then performed,   and the venous cannula was inserted.  It was advanced into the right atrium.    The patient was placed on cardiopulmonary bypass.  With the heart decompressed,   the intrathoracic bleeding resolved.  We then continued with the dissection.    The adhesions were dense throughout the thorax, and there were minimal evident   tissue planes.  Eventually, we were able to expose the right atrial free wall.    The region of the superior vena cava and ascending aorta were densely scarred in   and this was made more challenging by the fact that the patient's ascending   aorta was fairly short.  Eventually, we were able to expose the superior vena   cava and the right side of the aorta.  After much deliberate dissection, we were   able to expose the left side of the aorta near the bifurcation of the pulmonary   artery.  This allowed adequate exposure for placement of the aortic   cross-clamp.  Another 5-0 Prolene  suture was used as a pursestring in the   superior vena cava, and again the venous cannula was inserted.  The femoral   venous cannula was then pulled back into a retrohepatic position.    Circumferential control was obtained over the superior vena cava and the   inferior vena cava.  The aortic cross-clamp was applied, and the heart was   allowed to undergo ischemic arrest.  The left side of the heart was decompressed   by opening the outflow graft and placing a weighted sump within it.  We then   began the dissection of the left side of the heart.  We were able to identify   the pericardial edge eventually, and mobilize the left ventricle within the   pericardial space.  We were able to complete mobilization of the left   ventricular assist device, which had been started at the time of the   retrosternal dissection.  Eventually, we performed a standard cardiectomy in   anticipation of a bicaval implant.  Once the heart was out, the donor organ was   confirmed by the Miracle code.  It was decanted, and we sought, but could not   identify a patent foramen ovale.  The left atrium was prepared.  The great   vessels were prepared.  A dose of cardioplegia was administered antegrade to the   graft.  We then began the implant by first performing the left atrial   anastomosis using a running 3-0 Prolene suture.  We followed that with the   inferior vena caval anastomosis, using a running 4-0 Prolene suture.  Another   dose of cardioplegia was administered.  The superior vena cava anastomosis   followed, again using a running 4-0 Prolene suture.  The donor and recipient   pulmonary arteries were tailored, and the pulmonary arterial anastomosis was   performed using a running 4-0 Prolene suture.  Finally, the donor aorta was   tailored, and the aortic anastomosis was performed using a running 4-0 Prolene   suture.  The steroids were administered by Anesthesia and the aortic cross-clamp   was removed.  Given the long ischemic  time, the heart was allowed to reperfuse   for 30 minutes.  During this interval, an incision was made over the patient's   previous incision just below the left clavicle.  The pacemaker defibrillator was   removed from its pocket along with its associated leads.  We then turned our   attention back to the chest.  There was good surgical hemostasis.  After   allowing the 30-minute reperfusion interval, the patient was weaned from   cardiopulmonary bypass.  The patient did separate easily from bypass.  Once off   bypass, all surgical sites were inspected.  There was good surgical hemostasis.    On NADINE, the graft demonstrated good biventricular function.  The test dose of   protamine was administered, and this was well tolerated.  Prior to reaching   long term on the total dose, the femoral arterial and venous cannulas were   removed.  The total dose was then given.  The mediastinum was repeatedly packed   as blood and blood products were administered by Anesthesia to address the   coagulopathy.  Eventually, adequate hemostasis was obtained.  Drains were placed   in the left chest, the right chest, and two in the mediastinum.  Ventricular   and atrial pacing wires were placed.  After again confirming adequate   hemostasis, the patient's chest was closed using #6 stainless steel wires to   reapproximate the sternum.  The overlying soft tissues were reapproximated using   absorbable suture material.  The patient's chest was washed and dried, and a   dry dressing was applied.  The patient tolerated the procedure well, there were   no complications.  The groin incision was closed in multiple layers of   absorbable suture material as was the defibrillator incision.  At the conclusion   of the case, sponge and instrument counts were correct.  The driveline was   removed without incident.      PP/IN  dd: 12/16/2019 12:23:30 (CST)  td: 12/16/2019 16:09:57 ([facility])  Doc ID   #[id number]  Job ID #[id number]    CC:     [id number]

## 2019-12-16 NOTE — NURSING
Rounds Report: Attended interdisciplinary rounds this afternoon   with the transplant team including SW, physicians, fellows,  mid-level providers, and transplant coordinators. Graft looks good. No plan, pt transplaned this morning.

## 2019-12-16 NOTE — PROGRESS NOTES
Dr. Moses and Dr. Dougherty @ bedside. Updated on current gtt rates. Pleural chest tube still ~100mL/hr. CBC and coags sent. Pt AV paced @ 110 per MD.

## 2019-12-16 NOTE — SUBJECTIVE & OBJECTIVE
Interval HPI:   Overnight events:  Patient is intubated and sedated in SICU at time of consult.   Diet clear liquid (pending advancement)    Eating:   NPO  Nausea: No  Hypoglycemia and intervention: No  Fever: No  TPN and/or TF: No  If yes, type of TF/TPN and rate: None    PMH, PSH, FH, SH reviewed     ROS:  Unable to obtain due to: Sedation,Intubation,Altered mental status,Critical illness,Reviewed ROS from note dated No recent ROS noted on file.     Current Medications and/or Treatments Impacting Glycemic Control  Immunotherapy:    Immunosuppressants     None        Steroids:   Hormones (From admission, onward)    Start     Stop Route Frequency Ordered    12/16/19 1315  vasopressin (PITRESSIN) 0.2 Units/mL in dextrose 5 % 100 mL infusion      -- IV Continuous 12/16/19 1214        Pressors:    Autonomic Drugs (From admission, onward)    Start     Stop Route Frequency Ordered    12/16/19 1315  EPINEPHrine (ADRENALIN) 5 mg in sodium chloride 0.9% 250 mL infusion     Question Answer Comment   Titrate by: (in mcg/kg/min) 0.01    Titrate interval: (in minutes) 5    Titrate to maintain: (SBP or MAP or Cardiac Index) SBP    Maximum dose: (in mcg/kg/min) 2        -- IV Continuous 12/16/19 1214    12/16/19 1315  norepinephrine 4 mg in dextrose 5% 250 mL infusion (premix) (titrating)     Question Answer Comment   Titrate by: (in mcg/kg/min) 0.05    Titrate interval: (in minutes) 5    Titrate to maintain: (MAP or SBP) MAP    Greater than: (in mmHg) 65    Maximum dose: (in mcg/kg/min) 3        -- IV Continuous 12/16/19 1214        Hyperglycemia/Diabetes Medications:   Antihyperglycemics (From admission, onward)    Start     Stop Route Frequency Ordered    12/16/19 1315  insulin regular (Humulin R) 100 Units in sodium chloride 0.9% 100 mL infusion     Question:  Insulin rate changes (DO NOT MODIFY ANSWER)  Answer:  [email or web address] [email or web address] [id number]    -- IV Continuous 12/16/19 1214              PHYSICAL EXAMINATION:  Vitals:    12/16/19 1415   BP:    Pulse: 101   Resp: (!) 25   Temp:      Body mass index is 35.62 kg/m².    Physical Exam   Constitutional: Well developed, well nourished, NAD. Sedated.  ENT: External ears no masses with nose patent  Neck: Supple; trachea midline; no thyromegaly.  Cardiovascular: Heart sounds present with no LE edema.   Lungs: lungs anterior bilaterally clear to auscultation. Intubated on ventilator.  Abdomen: Soft, no masses, no hernias.  MS: No clubbing or cyanosis of nails noted unable to assess gait.  Skin: No rashes, lesions, or ulcers; no nodules. Mid-sternal incision with dressing; chest tubes intact.   Foot: nails in good condition, no amputations noted.

## 2019-12-16 NOTE — ANESTHESIA POSTPROCEDURE EVALUATION
Anesthesia Post Evaluation    Patient: Deborah Navas    Procedure(s) Performed: Procedure(s) (LRB):  TRANSPLANT, HEART (N/A)    Final Anesthesia Type: general    Patient location during evaluation: ICU  Patient participation: No - Unable to Participate, Intubation  Level of consciousness: sedated  Post-procedure vital signs: reviewed and stable  Pain management: adequate  Airway patency: patent    PONV status at discharge: No PONV  Anesthetic complications: no      Cardiovascular status: hemodynamically stable (on inotropes)  Respiratory status: ETT and ventilator  Hydration status: euvolemic            Vitals Value Taken Time   BP 92/51 12/16/2019  3:01 PM   Temp 36.5 °C (97.7 °F) 12/16/2019  3:00 PM   Pulse 96 12/16/2019  3:58 PM   Resp 23 12/16/2019  3:58 PM   SpO2 100 % 12/16/2019  3:58 PM   Vitals shown include unvalidated device data.      No case tracking events are documented in the log.      Pain/Zaria Score: No data recorded

## 2019-12-16 NOTE — NURSING
Heart Transplant Coordinator Education  Pt intubated and resting in ICU. Three relatives were at bedside. Introduced myself and gave business cards. Reviewed the inpatient stay expectations and once discharged to apartments. Answered their questions which were specific and appropriate.

## 2019-12-16 NOTE — PT/OT/SLP DISCHARGE
Physical Therapy Discharge Summary    Name: Deborah Navas  MRN: [id number]   Principal Problem: Kidney transplant candidate     Patient Discharged from acute Physical Therapy on 2019.      Objective:     GOALS:   Multidisciplinary Problems     Physical Therapy Goals        Problem: Physical Therapy Goal    Goal Priority Disciplines Outcome Goal Variances Interventions   Physical Therapy Goal     PT, PT/OT Ongoing, Progressing     Description:  Goals to be met by: 2019     Patient will increase functional independence with mobility by performin. Gait x 25ft with Supervision - not met                        Reasons for Discontinuation of Therapy Services  Transfer to alternate level of care.  Pt to OR for heart transplant.    Plan:     Patient Discharged to: SICU.      Niurka Posadas, PT, DPT  2019  [phone]

## 2019-12-16 NOTE — PROCEDURES
Device Programming Note    Type of Device: Medtronic    Previous settings :  Mode: DDD at base rate of 60 bpm    Pacemaker dependent, CHB.     Changes made:  VT Therapy - OFF  VF therapy - OFF  Mode: DOO at base rate of 60 bpm      Tod Leyva  9:16 PM

## 2019-12-16 NOTE — ASSESSMENT & PLAN NOTE
BG goal 140 - 180     Continue IV insulin infusion protocol  Requires intensive BG monitoring while on protocol     Discharge planning: JONAS

## 2019-12-16 NOTE — ASSESSMENT & PLAN NOTE
Titrate insulin slowly to avoid hypoglycemia as the risk of hypoglycemia increases with decreased creatinine clearance.

## 2019-12-16 NOTE — NURSING TRANSFER
Nursing Transfer Note      12/16/2019     Transfer To: OR  From: [id number]    Transfer via bed    Transfer with Simon @ 20 to 4L NC O2, cardiac monitoring    Transported by anesthesia    Medicines sent: Epi     Chart send with patient: Yes    Notified: family at bedside    Patient transported to OR with LVAD emergency bag (see flowsheet for inventory)

## 2019-12-16 NOTE — CONSULTS
[facility]  Endocrinology  Diabetes Consult Note    Consult Requested by: Jolene Lua MD   Reason for admit: Kidney transplant candidate    HISTORY OF PRESENT ILLNESS:  Reason for Consult: Management of Post transplant Hyperglycemia     Surgical Procedure and Date:   Heart Transplant 12/16/2019      HPI:   Patient is a 49 y.o. female with a diagnosis of NICM, s/p HM3 implantation 9/10/19, V-fib reported in setting of hypokalemia with appropriate shock from ICD on Amiodarone prior to LVAD placement. Patient is now s/p heart transplant. Endocrinology consulted to manage hyperglycemia s/p heart surgery.     Lab Results   Component Value Date    HGBA1C 6.5 (H) 09/06/2019             Interval HPI:   Overnight events:  Patient is intubated and sedated in SICU at time of consult.   Diet clear liquid (pending advancement)    Eating:   NPO  Nausea: No  Hypoglycemia and intervention: No  Fever: No  TPN and/or TF: No  If yes, type of TF/TPN and rate: None    PMH, PSH, FH, SH reviewed     ROS:  Unable to obtain due to: Sedation,Intubation,Altered mental status,Critical illness,Reviewed ROS from note dated No recent ROS noted on file.     Current Medications and/or Treatments Impacting Glycemic Control  Immunotherapy:    Immunosuppressants     None        Steroids:   Hormones (From admission, onward)    Start     Stop Route Frequency Ordered    12/16/19 1315  vasopressin (PITRESSIN) 0.2 Units/mL in dextrose 5 % 100 mL infusion      -- IV Continuous 12/16/19 1214        Pressors:    Autonomic Drugs (From admission, onward)    Start     Stop Route Frequency Ordered    12/16/19 1315  EPINEPHrine (ADRENALIN) 5 mg in sodium chloride 0.9% 250 mL infusion     Question Answer Comment   Titrate by: (in mcg/kg/min) 0.01    Titrate interval: (in minutes) 5    Titrate to maintain: (SBP or MAP or Cardiac Index) SBP    Maximum dose: (in mcg/kg/min) 2        -- IV Continuous 12/16/19 1214    12/16/19 1315   norepinephrine 4 mg in dextrose 5% 250 mL infusion (premix) (titrating)     Question Answer Comment   Titrate by: (in mcg/kg/min) 0.05    Titrate interval: (in minutes) 5    Titrate to maintain: (MAP or SBP) MAP    Greater than: (in mmHg) 65    Maximum dose: (in mcg/kg/min) 3        -- IV Continuous 12/16/19 1214        Hyperglycemia/Diabetes Medications:   Antihyperglycemics (From admission, onward)    Start     Stop Route Frequency Ordered    12/16/19 1315  insulin regular (Humulin R) 100 Units in sodium chloride 0.9% 100 mL infusion     Question:  Insulin rate changes (DO NOT MODIFY ANSWER)  Answer:  [email or web address] [email or web address] [id number]    -- IV Continuous 12/16/19 1214             PHYSICAL EXAMINATION:  Vitals:    12/16/19 1415   BP:    Pulse: 101   Resp: (!) 25   Temp:      Body mass index is 35.62 kg/m².    Physical Exam   Constitutional: Well developed, well nourished, NAD. Sedated.  ENT: External ears no masses with nose patent  Neck: Supple; trachea midline; no thyromegaly.  Cardiovascular: Heart sounds present with no LE edema.   Lungs: lungs anterior bilaterally clear to auscultation. Intubated on ventilator.  Abdomen: Soft, no masses, no hernias.  MS: No clubbing or cyanosis of nails noted unable to assess gait.  Skin: No rashes, lesions, or ulcers; no nodules. Mid-sternal incision with dressing; chest tubes intact.   Foot: nails in good condition, no amputations noted.        Labs Reviewed and Include   Recent Labs   Lab 12/16/19  1015 12/16/19  1234   * 159*   CALCIUM 8.6* 8.7   ALBUMIN 2.8*  --    PROT 4.6*  --     140   K 3.2* 3.4*  3.4*   CO2 20* 21*    101   BUN 20 19   CREATININE 1.8* 1.7*   ALKPHOS 44*  --    ALT 13  --    AST 44*  --    BILITOT 0.6  --      Lab Results   Component Value Date    WBC 13.03 (H) 12/16/2019    HGB 7.7 (L) 12/16/2019    HCT 25.5 (L) 12/16/2019    MCV 88 12/16/2019     (L) 12/16/2019     No results for  input(s): TSH, FREET4 in the last 168 hours.  Lab Results   Component Value Date    HGBA1C 6.5 (H) 09/06/2019       Nutritional status:   Body mass index is 35.62 kg/m².  Lab Results   Component Value Date    ALBUMIN 2.8 (L) 12/16/2019    ALBUMIN 3.5 12/16/2019    ALBUMIN 3.4 (L) 12/15/2019     Lab Results   Component Value Date    PREALBUMIN 27 12/16/2019    PREALBUMIN 28 12/13/2019    PREALBUMIN 29 12/11/2019       Estimated Creatinine Clearance: 47.3 mL/min (A) (based on SCr of 1.7 mg/dL (H)).    Accu-Checks  Recent Labs     12/16/19  1246 12/16/19  1402   POCTGLUCOSE 153* 192*        ASSESSMENT and PLAN    Stress hyperglycemia  BG goal 140 - 180     Continue IV insulin infusion protocol  Requires intensive BG monitoring while on protocol     Discharge planning: TBD        Adverse effect of adrenal cortical steroids, sequela  On steroid therapy per transplant team; may elevate BG readings        CKD (chronic kidney disease)  Titrate insulin slowly to avoid hypoglycemia as the risk of hypoglycemia increases with decreased creatinine clearance.          Plan discussed with RN at bedside.     Sonny Boland, OLGA  Endocrinology  [facility]

## 2019-12-16 NOTE — HPI
Reason for Consult: Management of Post transplant Hyperglycemia     Surgical Procedure and Date:   Heart Transplant 12/16/2019      HPI:   Patient is a 49 y.o. female with a diagnosis of NICM, s/p HM3 implantation 9/10/19, V-fib reported in setting of hypokalemia with appropriate shock from ICD on Amiodarone prior to LVAD placement. Patient is now s/p heart transplant. Endocrinology consulted to manage hyperglycemia s/p heart surgery.     Lab Results   Component Value Date    HGBA1C 6.5 (H) 09/06/2019

## 2019-12-16 NOTE — ANESTHESIA PROCEDURE NOTES
NADINE    Diagnosis: Dx: CHF (congestive heart failure) (I50.9 (ICD-10-CM))  Patient location during procedure: OR  Procedure start time: 12/16/2019 4:54 AM  Timeout: 12/16/2019 4:54 AM  Procedure end time: 12/16/2019 4:54 AM  Exam type: Baseline  Staffing  Anesthesiologist: Crow Luu MD  Performed: anesthesiologist and fellow   Preanesthetic Checklist  Completed: patient identified, surgical consent, pre-op evaluation, timeout performed, risks and benefits discussed, monitors and equipment checked, anesthesia consent given, oxygen available, suction available, hand hygiene performed and patient being monitored  Setup & Induction  Patient preparation: bite block inserted  Probe Insertion: easy  Exam: complete      Findings  Impression  Other Findings  Pre Heart transplant:    Severely decreased left ventricular systolic function  LVAD present with laminar flow   IVS midline  Severely dilated right ventricle with depressed function   Mild AI   Moderate MR   No PFO, No IAS defect  Small left pleural effusion   Aorta intact, no dissection     Post Heart Transplant:  New Heart   Normal biventricular function on inotropes.   No Aortic valve abnormalities.   Mild MR  Moderate TR   Trace PI.   No PFO.    Small left pleural effusion remains.   Aorta remains intact,  No dissection.     Probe Removal      Exam     Left Heart  Left Atruim: dilated    Left Ventricle: cm, severely abnormal (men >/= 6.8; women>/= 6.1)    LVAD:yes  Estimated Ejection Fraction: < 25% severe            Right Heart  Right Ventricle: dilated  Right Ventricle Function: severely decreased  Right Atria: cm and moderately abnormal    Intra Atrial Septum  PFO: no shunt by color flow doppler  no IAS aneurysm  no lipomatous hypertrophy  no Atrial Septal Defect (Asd)    Right Ventricle  Size: dilated    Aortic Valve:  Regurgitation: mild (2+) aortic regurgitation                 Great Vessels  Ascending Aorta Atherosclerosis: 2=mild dz (<3mm)  Aortic  Arch Atherosclerosis: 2=mild dz (<3mm)  IABP: no  Descending Aorta Atherosclerosis: 2=mild dz (<3mm)  Aorta    Descending aorta IABP: no    Effusions  Effusions: left pleural    Summary  Findings discussed with surgeon.    Other Findings   Pre Heart transplant:    Severely decreased left ventricular systolic function  LVAD present with laminar flow   IVS midline  Severely dilated right ventricle with depressed function   Mild AI   Moderate MR   No PFO, No IAS defect  Small left pleural effusion   Aorta intact, no dissection     Post Heart Transplant:  New Heart   Normal biventricular function on inotropes.   No Aortic valve abnormalities.   Mild MR  Moderate TR   Trace PI.   No PFO.    Small left pleural effusion remains.   Aorta remains intact,  No dissection.

## 2019-12-16 NOTE — HPI
49-year-old female with NICM s/p LVAD implantation 09/10/19 who was recently admitted for inotropic infusions and NO after experiencing right heart failure. She is now s/p heart transplant 12/16/19 arriving to SICU intubated on levo, vaso, epi, and NO. Operative course was complicated by entry into RV during dissection requiring peripheral cannulation. Otherwise, post operative course was okay. Received 5 FFP, 3 pRBC, 1 plt, and 1 cryo. Doing well.

## 2019-12-16 NOTE — BRIEF OP NOTE
[facility]  Cardiothoracic Surgery  Operative Note    SUMMARY     Date of Procedure: 12/16/2019     Procedure: Procedure(s) (LRB):  1)  TRANSPLANT, HEART, using bicaval technique, ischemic time 6h 8 min [id number]  2)  Removal implantable intracorporeal left ventricular assist device (HM3) [id number]  3)  Removal AICD  [id number]  4)  Backbench preparation of donor heart for implant [id number]    Surgeon(s) and Role:     * Talha Nuñez MD - Primary     * Frankie Rush MD - Fellow    Assisting Surgeon: None    Pre-Operative Diagnosis: Acute on chronic combined systolic and diastolic heart failure [I50.43]    Post-Operative Diagnosis: Post-Op Diagnosis Codes:     * Acute on chronic combined systolic and diastolic heart failure [I50.43]    Anesthesia: Choice        Description of the Findings of the Procedure: Excellent initial graft function.        Complications: None     Estimated Blood Loss (EBL): None             Specimens:   Specimen (12h ago, onward)    None                  Attestation:  I was present and scrubbed for the procedure.

## 2019-12-16 NOTE — CARE UPDATE
The pt arrived intubated and placed on optimal vent settings along with 5PPM Nitric per MD Dougherty. Tube place at 23@ the lip.

## 2019-12-16 NOTE — PROGRESS NOTES
TRANSPLANT NOTE:    Deborah Navas is a 49 y.o. female s/p heart      transplant on 12.16.19.      Past Medical History:   Diagnosis Date    Fractures     History of ventricular fibrillation 9/4/2019    History of ventricular tachycardia 9/4/2019    Hyperlipidemia     Migraine     Osteoarthritis        Allergies:   Review of patient's allergies indicates:   Allergen Reactions    Adhesive Blisters     Reaction to area in chest and up only    Codeine Itching       Donor CMV Status: R-/D+  Donor HBV YFN: -  Donor HCV YFN: -     The following factors were assessed to determine risk of rejection:     Age: 49 y.o.  Gender: female  Race: White  Prior Pregnancy: NA   cPRA current / peak within one year: 0/0; weak HLA B46(1535)   HLA antigen mismatch: 4/6  Crossmatch: VXM negative    Deborah Navas is considered low risk for rejection, and would not require induction.     Pharmacy Interventions/Recommendations:  1) Induction: CNI sparing thymo pending renal function     2) Immunosuppression Goals: MMF started at 500mg IV Q12 hours;  SM 125mg IV Q8 x 3 doses.     3) Opportunistic Infection Prophylaxis:  Nystatin QID, valcyte to start POD 10 and bactrim SS daily when tolerable.     4) Home Medications Requiring Reinitiation:  Gabapentin 200mg q 12 hours, effexor xr 150mg daily.     5) PUD/DVT Prophylaxis: protonix BID     Patient is to begin self medications on transfer to the transplant step down unit. I plan to meet with this patient and his/her support person to review the medication section of the Heart Transplant Education Manual.     Current Facility-Administered Medications   Medication Dose Route Frequency Provider Last Rate Last Dose    0.9%  NaCl infusion (for blood administration)   Intravenous Q24H PRJESSICA Lua MD        0.9%  NaCl infusion (for blood administration)   Intravenous Q24H PRJESSICA Lua MD        ALPRAZolam tablet 0.25 mg  0.25 mg Oral BID PRN Julieth FIGUEROA  GALLO Ramos   0.25 mg at 12/15/19 0354    aspirin EC tablet 325 mg  325 mg Oral Daily Julieth Ramos PA-C   325 mg at 12/15/19 1000    bacitracin injection    PRN Talha Nuñez MD   50,000 Units at 12/16/19 1016    ceFAZolin injection 1 g  1 g Intravenous Once Jolene Lua MD   Stopped at 12/15/19 0315    EPINEPHrine (ADRENALIN) 5 mg in sodium chloride 0.9% 250 mL infusion  0.01 mcg/kg/min Intravenous Continuous Alphonse Valdes MD 3.1 mL/hr at 12/15/19 2205 0.01 mcg/kg/min at 12/15/19 2205    famotidine tablet 20 mg  20 mg Oral QHS Vi Bryant MD   20 mg at 12/15/19 2143    gabapentin capsule 200 mg  200 mg Oral BID Julieth Ramos PA-C   200 mg at 12/15/19 2142    gelatin adsorbable 100cm top sponge 100 sponge    PRN Talha Nuñez MD   1 applicator at 12/16/19 0451    hydrALAZINE tablet 50 mg  50 mg Oral Q8H Americo Albert, NP   50 mg at 12/15/19 2143    magnesium oxide tablet 400 mg  400 mg Oral Daily Julieth Ramos PA-C   400 mg at 12/15/19 1000    methylPREDNISolone sodium succinate injection 125 mg  125 mg Intravenous Q8H Jolene Lua MD        methylPREDNISolone sodium succinate injection 500 mg  500 mg Intravenous Once Jolene Lua MD   Stopped at 12/15/19 0315    mexiletine capsule 150 mg  150 mg Oral Q8H Jolene Lua MD   150 mg at 12/15/19 2142    microplegia arrest solution (KCL 80mEq/40 mL)  80 mEq Other Once Talha Nuñez MD        microplegia protection soln (LIDOCAINE 100mg/MAGNESIUM 4g/qs NS 40mL)   Other Once Talha Nuñez MD        mirtazapine tablet 15 mg  15 mg Oral QHS Julieth Ramos PA-C   15 mg at 12/15/19 2143    mupirocin 2 % ointment   Nasal On Call Procedure Jolene Lua MD        mycophenolate (CELLCEPT) 500 mg in dextrose 5 % 100 mL IVPB  500 mg Intravenous Q12H Jolene Lua MD        nitric oxide gas Gas 20 ppm  20 ppm Inhalation Continuous Julieth Ramos PA-C   20 ppm at 12/05/19  1004    nystatin 100,000 unit/mL suspension 500,000 Units  500,000 Units Oral QID (WM & HS) Jolene Lua MD        ondansetron injection 8 mg  8 mg Intravenous Q6H PRN Julieth Ramos PA-C   8 mg at 12/12/19 1937    pantoprazole EC tablet 40 mg  40 mg Oral BID AC Julieth Ramos PA-C   40 mg at 12/15/19 1639    pneumoc 13-amber conj-dip cr(PF) (PREVNAR 13 (PF)) 0.5 mL  0.5 mL Intramuscular vaccine x 1 dose Julieth Ramos PA-C        polyethylene glycol packet 17 g  17 g Oral Daily Julieth Ramos PA-C   17 g at 12/14/19 1004    potassium chloride SA CR tablet 40 mEq  40 mEq Oral BID ZAC Liz   40 mEq at 12/15/19 2142    promethazine tablet 12.5 mg  12.5 mg Oral Q4H PRN Julieth Ramos PA-C   12.5 mg at 12/08/19 1231    senna-docusate 8.6-50 mg per tablet 1 tablet  1 tablet Oral BID Julieth Ramos PA-C   1 tablet at 12/15/19 2142    sodium chloride 0.65 % nasal spray 1 spray  1 spray Each Nostril PRN Julieth Ramos PA-C        sucralfate 100 mg/mL suspension 1 g  1 g Oral QID (AC & HS) Julieth Ramos PA-C   1 g at 12/15/19 1308    venlafaxine tablet 150 mg  150 mg Oral Daily Julieth Ramos PA-C   150 mg at 12/15/19 1000    white petrolatum 41 % ointment   Topical (Top) PRN Julieth Ramos PA-C        zolpidem tablet 5 mg  5 mg Oral Nightly PRN Julieth Ramos PA-C   5 mg at 12/14/19 5588     Facility-Administered Medications Ordered in Other Encounters   Medication Dose Route Frequency Provider Last Rate Last Dose    albumin human 5% bottle    Continuous PRN Joaquin Garrison MD   Stopped at 12/16/19 0555    calcium chloride 100 mg/mL (10 %) injection   Intravenous PRN Lance Emery MD   0.5 g at 12/16/19 1037    ceFAZolin (ANCEF) 2 g in sodium chloride 0.9% 10 mL IVPB    Continuous PRN Joaquin Garrison MD   2 g at 12/16/19 0839    electrolyte-S (ISOLYTE)   Intravenous Continuous PRN Joaquin Garrison MD         electrolyte-S (ISOLYTE)   Intravenous Continuous PRN Joaquin Garrison MD        EPINEPHrine (ADRENALIN) 5 mg infusion   Intravenous Continuous PRN Joaquin Garrison MD 0 mL/hr at 12/16/19 0613 0.08 mcg/kg/min at 12/16/19 0927    etomidate injection   Intravenous PRN Joaquin Garrison MD   10 mg at 12/16/19 0325    fentaNYL injection   Intravenous PRN Joaquin Garrison MD   100 mcg at 12/16/19 1115    heparin (porcine) injection    PRN Joaquin Garrison MD   29,000 Units at 12/16/19 0550    insulin regular (Humulin R) 100 Units in sodium chloride 0.9% 100 mL infusion    Continuous PRN Joaquin Garrison MD 3 mL/hr at 12/16/19 1020 3 Units/hr at 12/16/19 1020    ketamine in 0.9 % sod chloride 100 mg/10 mL (10 mg/mL) injection    PRN Joaquin Garrison MD   20 mg at 12/16/19 0548    lidocaine (cardiac) injection    PRN Lance Emery MD   50 mg at 12/16/19 0850    lidocaine (PF) 20 mg/ml (2%) injection    PRN Joaquin Garrison MD   4 mL at 12/16/19 0453    magnesium sulfate 2g in water 50mL IVPB (premix)    PRN Lance Emery MD   2 g at 12/16/19 0910    midazolam injection   Intravenous PRN Joaquin Garrison MD   3 mg at 12/16/19 1138    nitroGLYCERIN injection    PRN Lance Emery MD   50 mcg at 12/16/19 0927    norepinephrine 4 mg in dextrose 5 % 250 mL infusion   Intravenous Continuous PRN Joaquin Garrison MD 15.1 mL/hr at 12/16/19 1100 0.04 mcg/kg/min at 12/16/19 1100    potassium chloride 20 mEq in 100 mL IVPB (FOR CENTRAL LINE ADMINISTRATION ONLY)   Intravenous Continuous PRN Lance Emery MD   Stopped at 12/16/19 1021    propofol (DIPRIVAN) 10 mg/mL infusion    Continuous PRN Lance Emery MD 15.1 mL/hr at 12/16/19 1030 25 mcg/kg/min at 12/16/19 1030    protamine injection   Intravenous PRN Lance Emery MD   50 mg at 12/16/19 1037    rocuronium injection    PRN Joaquin Garrison MD   50 mg at 12/16/19 1045     tranexamic acid infusion   Intravenous Continuous PRN Joaquin Garrison MD 50.4 mL/hr at 12/16/19 0353 1 mg/kg/hr at 12/16/19 0353    tranexamic acid injection Soln   Intravenous PRN Joaquin Garrison MD   1,000 mg at 12/16/19 0419    vasopressin (PITRESSIN) 20 Units in sodium chloride 0.9% 100 mL infusion    Continuous PRN Joaquin Garrison MD 12 mL/hr at 12/16/19 1045 0.04 Units/min at 12/16/19 1045

## 2019-12-17 DIAGNOSIS — E11.22 TYPE 2 DIABETES MELLITUS WITH STAGE 3 CHRONIC KIDNEY DISEASE, WITHOUT LONG-TERM CURRENT USE OF INSULIN: ICD-10-CM

## 2019-12-17 DIAGNOSIS — Z79.52 LONG TERM CURRENT USE OF SYSTEMIC STEROIDS: ICD-10-CM

## 2019-12-17 DIAGNOSIS — E78.2 MIXED HYPERLIPIDEMIA: ICD-10-CM

## 2019-12-17 DIAGNOSIS — I10 ESSENTIAL HYPERTENSION: ICD-10-CM

## 2019-12-17 DIAGNOSIS — E03.8 OTHER SPECIFIED HYPOTHYROIDISM: ICD-10-CM

## 2019-12-17 DIAGNOSIS — Z94.1 STATUS POST HEART TRANSPLANT: ICD-10-CM

## 2019-12-17 DIAGNOSIS — T86.298 OTHER COMPLICATION OF HEART TRANSPLANT: ICD-10-CM

## 2019-12-17 DIAGNOSIS — N28.9 RENAL INSUFFICIENCY: ICD-10-CM

## 2019-12-17 DIAGNOSIS — N18.30 TYPE 2 DIABETES MELLITUS WITH STAGE 3 CHRONIC KIDNEY DISEASE, WITHOUT LONG-TERM CURRENT USE OF INSULIN: ICD-10-CM

## 2019-12-17 DIAGNOSIS — Z79.899 ENCOUNTER FOR LONG-TERM (CURRENT) USE OF MEDICATIONS: ICD-10-CM

## 2019-12-17 LAB
ALBUMIN SERPL BCP-MCNC: 3.6 G/DL (ref 3.5–5.2)
ALBUMIN SERPL BCP-MCNC: 3.6 G/DL (ref 3.5–5.2)
ALBUMIN SERPL BCP-MCNC: 3.8 G/DL (ref 3.5–5.2)
ALBUMIN SERPL BCP-MCNC: 3.8 G/DL (ref 3.5–5.2)
ALLENS TEST: ABNORMAL
ALP SERPL-CCNC: 43 U/L (ref 55–135)
ALP SERPL-CCNC: 46 U/L (ref 55–135)
ALP SERPL-CCNC: 47 U/L (ref 55–135)
ALP SERPL-CCNC: 50 U/L (ref 55–135)
ALT SERPL W/O P-5'-P-CCNC: 113 U/L (ref 10–44)
ALT SERPL W/O P-5'-P-CCNC: 116 U/L (ref 10–44)
ALT SERPL W/O P-5'-P-CCNC: 38 U/L (ref 10–44)
ALT SERPL W/O P-5'-P-CCNC: 79 U/L (ref 10–44)
ANION GAP SERPL CALC-SCNC: 10 MMOL/L (ref 8–16)
ANION GAP SERPL CALC-SCNC: 11 MMOL/L (ref 8–16)
ANION GAP SERPL CALC-SCNC: 12 MMOL/L (ref 8–16)
ANION GAP SERPL CALC-SCNC: 14 MMOL/L (ref 8–16)
ANISOCYTOSIS BLD QL SMEAR: SLIGHT
APTT BLDCRRT: 23.1 SEC (ref 21–32)
APTT BLDCRRT: 24.5 SEC (ref 21–32)
AST SERPL-CCNC: 101 U/L (ref 10–40)
AST SERPL-CCNC: 176 U/L (ref 10–40)
AST SERPL-CCNC: 302 U/L (ref 10–40)
AST SERPL-CCNC: 322 U/L (ref 10–40)
BASO STIPL BLD QL SMEAR: ABNORMAL
BASOPHILS # BLD AUTO: 0.01 K/UL (ref 0–0.2)
BASOPHILS # BLD AUTO: 0.02 K/UL (ref 0–0.2)
BASOPHILS NFR BLD: 0.1 % (ref 0–1.9)
BILIRUB SERPL-MCNC: 0.4 MG/DL (ref 0.1–1)
BILIRUB SERPL-MCNC: 0.5 MG/DL (ref 0.1–1)
BILIRUB SERPL-MCNC: 0.5 MG/DL (ref 0.1–1)
BILIRUB SERPL-MCNC: 0.7 MG/DL (ref 0.1–1)
BLD PROD TYP BPU: NORMAL
BLOOD UNIT EXPIRATION DATE: NORMAL
BLOOD UNIT TYPE CODE: 5100
BLOOD UNIT TYPE: NORMAL
BUN SERPL-MCNC: 26 MG/DL (ref 6–20)
BUN SERPL-MCNC: 28 MG/DL (ref 6–20)
BUN SERPL-MCNC: 30 MG/DL (ref 6–20)
BUN SERPL-MCNC: 34 MG/DL (ref 6–20)
BUN SERPL-MCNC: 36 MG/DL (ref 6–20)
BUN SERPL-MCNC: 41 MG/DL (ref 6–20)
BURR CELLS BLD QL SMEAR: ABNORMAL
CALCIUM SERPL-MCNC: 8.9 MG/DL (ref 8.7–10.5)
CALCIUM SERPL-MCNC: 9.1 MG/DL (ref 8.7–10.5)
CALCIUM SERPL-MCNC: 9.2 MG/DL (ref 8.7–10.5)
CALCIUM SERPL-MCNC: 9.3 MG/DL (ref 8.7–10.5)
CHLORIDE SERPL-SCNC: 101 MMOL/L (ref 95–110)
CHLORIDE SERPL-SCNC: 102 MMOL/L (ref 95–110)
CO2 SERPL-SCNC: 22 MMOL/L (ref 23–29)
CO2 SERPL-SCNC: 22 MMOL/L (ref 23–29)
CO2 SERPL-SCNC: 23 MMOL/L (ref 23–29)
CO2 SERPL-SCNC: 24 MMOL/L (ref 23–29)
CO2 SERPL-SCNC: 24 MMOL/L (ref 23–29)
CO2 SERPL-SCNC: 25 MMOL/L (ref 23–29)
CODING SYSTEM: NORMAL
CREAT SERPL-MCNC: 2.2 MG/DL (ref 0.5–1.4)
CREAT SERPL-MCNC: 2.5 MG/DL (ref 0.5–1.4)
CREAT SERPL-MCNC: 2.5 MG/DL (ref 0.5–1.4)
CREAT SERPL-MCNC: 2.9 MG/DL (ref 0.5–1.4)
CREAT SERPL-MCNC: 2.9 MG/DL (ref 0.5–1.4)
CREAT SERPL-MCNC: 3.1 MG/DL (ref 0.5–1.4)
DELSYS: ABNORMAL
DIFFERENTIAL METHOD: ABNORMAL
DISPENSE STATUS: NORMAL
EOSINOPHIL # BLD AUTO: 0 K/UL (ref 0–0.5)
EOSINOPHIL NFR BLD: 0 % (ref 0–8)
ERYTHROCYTE [DISTWIDTH] IN BLOOD BY AUTOMATED COUNT: 16.6 % (ref 11.5–14.5)
ERYTHROCYTE [DISTWIDTH] IN BLOOD BY AUTOMATED COUNT: 16.8 % (ref 11.5–14.5)
ERYTHROCYTE [DISTWIDTH] IN BLOOD BY AUTOMATED COUNT: 16.9 % (ref 11.5–14.5)
ERYTHROCYTE [DISTWIDTH] IN BLOOD BY AUTOMATED COUNT: 17 % (ref 11.5–14.5)
ERYTHROCYTE [DISTWIDTH] IN BLOOD BY AUTOMATED COUNT: 17 % (ref 11.5–14.5)
ERYTHROCYTE [DISTWIDTH] IN BLOOD BY AUTOMATED COUNT: 17.1 % (ref 11.5–14.5)
ERYTHROCYTE [DISTWIDTH] IN BLOOD BY AUTOMATED COUNT: 17.2 % (ref 11.5–14.5)
ERYTHROCYTE [SEDIMENTATION RATE] IN BLOOD BY WESTERGREN METHOD: 22 MM/H
EST. GFR  (AFRICAN AMERICAN): 19.3 ML/MIN/1.73 M^2
EST. GFR  (AFRICAN AMERICAN): 21 ML/MIN/1.73 M^2
EST. GFR  (AFRICAN AMERICAN): 21 ML/MIN/1.73 M^2
EST. GFR  (AFRICAN AMERICAN): 25.1 ML/MIN/1.73 M^2
EST. GFR  (AFRICAN AMERICAN): 25.1 ML/MIN/1.73 M^2
EST. GFR  (AFRICAN AMERICAN): 29.3 ML/MIN/1.73 M^2
EST. GFR  (NON AFRICAN AMERICAN): 16.8 ML/MIN/1.73 M^2
EST. GFR  (NON AFRICAN AMERICAN): 18.2 ML/MIN/1.73 M^2
EST. GFR  (NON AFRICAN AMERICAN): 18.2 ML/MIN/1.73 M^2
EST. GFR  (NON AFRICAN AMERICAN): 21.7 ML/MIN/1.73 M^2
EST. GFR  (NON AFRICAN AMERICAN): 21.7 ML/MIN/1.73 M^2
EST. GFR  (NON AFRICAN AMERICAN): 25.4 ML/MIN/1.73 M^2
FIBRINOGEN PPP-MCNC: 292 MG/DL (ref 182–366)
GIANT PLATELETS BLD QL SMEAR: PRESENT
GIANT PLATELETS BLD QL SMEAR: PRESENT
GLUCOSE SERPL-MCNC: 121 MG/DL (ref 70–110)
GLUCOSE SERPL-MCNC: 144 MG/DL (ref 70–110)
GLUCOSE SERPL-MCNC: 147 MG/DL (ref 70–110)
GLUCOSE SERPL-MCNC: 156 MG/DL (ref 70–110)
GLUCOSE SERPL-MCNC: 170 MG/DL (ref 70–110)
GLUCOSE SERPL-MCNC: 188 MG/DL (ref 70–110)
GLUCOSE SERPL-MCNC: 215 MG/DL (ref 70–110)
HCO3 UR-SCNC: 25.3 MMOL/L (ref 24–28)
HCO3 UR-SCNC: 25.3 MMOL/L (ref 24–28)
HCO3 UR-SCNC: 26.4 MMOL/L (ref 24–28)
HCO3 UR-SCNC: 26.9 MMOL/L (ref 24–28)
HCT VFR BLD AUTO: 20.8 % (ref 37–48.5)
HCT VFR BLD AUTO: 23 % (ref 37–48.5)
HCT VFR BLD AUTO: 23.6 % (ref 37–48.5)
HCT VFR BLD AUTO: 24.2 % (ref 37–48.5)
HCT VFR BLD AUTO: 24.2 % (ref 37–48.5)
HCT VFR BLD AUTO: 24.6 % (ref 37–48.5)
HCT VFR BLD AUTO: 24.9 % (ref 37–48.5)
HCT VFR BLD CALC: <15 %PCV (ref 36–54)
HGB BLD-MCNC: 6.5 G/DL (ref 12–16)
HGB BLD-MCNC: 7.3 G/DL (ref 12–16)
HGB BLD-MCNC: 7.4 G/DL (ref 12–16)
HGB BLD-MCNC: 7.4 G/DL (ref 12–16)
HGB BLD-MCNC: 7.5 G/DL (ref 12–16)
HGB BLD-MCNC: 7.7 G/DL (ref 12–16)
HGB BLD-MCNC: 7.8 G/DL (ref 12–16)
HYPOCHROMIA BLD QL SMEAR: ABNORMAL
IMM GRANULOCYTES # BLD AUTO: 0.08 K/UL (ref 0–0.04)
IMM GRANULOCYTES # BLD AUTO: 0.12 K/UL (ref 0–0.04)
IMM GRANULOCYTES # BLD AUTO: 0.13 K/UL (ref 0–0.04)
IMM GRANULOCYTES # BLD AUTO: 0.15 K/UL (ref 0–0.04)
IMM GRANULOCYTES # BLD AUTO: 0.16 K/UL (ref 0–0.04)
IMM GRANULOCYTES # BLD AUTO: 0.16 K/UL (ref 0–0.04)
IMM GRANULOCYTES # BLD AUTO: 0.18 K/UL (ref 0–0.04)
IMM GRANULOCYTES NFR BLD AUTO: 0.6 % (ref 0–0.5)
IMM GRANULOCYTES NFR BLD AUTO: 0.6 % (ref 0–0.5)
IMM GRANULOCYTES NFR BLD AUTO: 0.7 % (ref 0–0.5)
IMM GRANULOCYTES NFR BLD AUTO: 0.8 % (ref 0–0.5)
IMM GRANULOCYTES NFR BLD AUTO: 1.4 % (ref 0–0.5)
INR PPP: 1.3 (ref 0.8–1.2)
INR PPP: 1.3 (ref 0.8–1.2)
LDH SERPL L TO P-CCNC: 2.02 MMOL/L (ref 0.36–1.25)
LYMPHOCYTES # BLD AUTO: 0.3 K/UL (ref 1–4.8)
LYMPHOCYTES # BLD AUTO: 0.4 K/UL (ref 1–4.8)
LYMPHOCYTES # BLD AUTO: 0.5 K/UL (ref 1–4.8)
LYMPHOCYTES # BLD AUTO: 0.5 K/UL (ref 1–4.8)
LYMPHOCYTES # BLD AUTO: 0.6 K/UL (ref 1–4.8)
LYMPHOCYTES NFR BLD: 1.9 % (ref 18–48)
LYMPHOCYTES NFR BLD: 2.1 % (ref 18–48)
LYMPHOCYTES NFR BLD: 2.1 % (ref 18–48)
LYMPHOCYTES NFR BLD: 2.6 % (ref 18–48)
LYMPHOCYTES NFR BLD: 2.6 % (ref 18–48)
LYMPHOCYTES NFR BLD: 2.7 % (ref 18–48)
LYMPHOCYTES NFR BLD: 3.5 % (ref 18–48)
MAGNESIUM SERPL-MCNC: 2.9 MG/DL (ref 1.6–2.6)
MAGNESIUM SERPL-MCNC: 3 MG/DL (ref 1.6–2.6)
MAGNESIUM SERPL-MCNC: 3 MG/DL (ref 1.6–2.6)
MAGNESIUM SERPL-MCNC: 3.1 MG/DL (ref 1.6–2.6)
MCH RBC QN AUTO: 26.6 PG (ref 27–31)
MCH RBC QN AUTO: 26.6 PG (ref 27–31)
MCH RBC QN AUTO: 26.9 PG (ref 27–31)
MCH RBC QN AUTO: 27 PG (ref 27–31)
MCH RBC QN AUTO: 27.2 PG (ref 27–31)
MCHC RBC AUTO-ENTMCNC: 30.6 G/DL (ref 32–36)
MCHC RBC AUTO-ENTMCNC: 31 G/DL (ref 32–36)
MCHC RBC AUTO-ENTMCNC: 31.3 G/DL (ref 32–36)
MCHC RBC AUTO-ENTMCNC: 31.4 G/DL (ref 32–36)
MCHC RBC AUTO-ENTMCNC: 31.7 G/DL (ref 32–36)
MCV RBC AUTO: 85 FL (ref 82–98)
MCV RBC AUTO: 86 FL (ref 82–98)
MCV RBC AUTO: 86 FL (ref 82–98)
MCV RBC AUTO: 87 FL (ref 82–98)
METHEMOGLOBIN: 1.3 % (ref 0–3)
MODE: ABNORMAL
MONOCYTES # BLD AUTO: 0.3 K/UL (ref 0.3–1)
MONOCYTES # BLD AUTO: 0.5 K/UL (ref 0.3–1)
MONOCYTES # BLD AUTO: 0.7 K/UL (ref 0.3–1)
MONOCYTES # BLD AUTO: 0.7 K/UL (ref 0.3–1)
MONOCYTES # BLD AUTO: 0.9 K/UL (ref 0.3–1)
MONOCYTES # BLD AUTO: 1 K/UL (ref 0.3–1)
MONOCYTES # BLD AUTO: 1 K/UL (ref 0.3–1)
MONOCYTES NFR BLD: 2.8 % (ref 4–15)
MONOCYTES NFR BLD: 3.5 % (ref 4–15)
MONOCYTES NFR BLD: 3.7 % (ref 4–15)
MONOCYTES NFR BLD: 3.9 % (ref 4–15)
MONOCYTES NFR BLD: 4.2 % (ref 4–15)
MONOCYTES NFR BLD: 4.8 % (ref 4–15)
MONOCYTES NFR BLD: 4.8 % (ref 4–15)
NEUTROPHILS # BLD AUTO: 10.9 K/UL (ref 1.8–7.7)
NEUTROPHILS # BLD AUTO: 12.5 K/UL (ref 1.8–7.7)
NEUTROPHILS # BLD AUTO: 15.7 K/UL (ref 1.8–7.7)
NEUTROPHILS # BLD AUTO: 17.6 K/UL (ref 1.8–7.7)
NEUTROPHILS # BLD AUTO: 18.7 K/UL (ref 1.8–7.7)
NEUTROPHILS # BLD AUTO: 19.5 K/UL (ref 1.8–7.7)
NEUTROPHILS # BLD AUTO: 19.7 K/UL (ref 1.8–7.7)
NEUTROPHILS NFR BLD: 91.7 % (ref 38–73)
NEUTROPHILS NFR BLD: 92.3 % (ref 38–73)
NEUTROPHILS NFR BLD: 92.3 % (ref 38–73)
NEUTROPHILS NFR BLD: 92.5 % (ref 38–73)
NEUTROPHILS NFR BLD: 92.8 % (ref 38–73)
NEUTROPHILS NFR BLD: 93.1 % (ref 38–73)
NEUTROPHILS NFR BLD: 93.7 % (ref 38–73)
NRBC BLD-RTO: 0 /100 WBC
NUM UNITS TRANS FFP: NORMAL
NUM UNITS TRANS PACKED RBC: NORMAL
OVALOCYTES BLD QL SMEAR: ABNORMAL
PCO2 BLDA: 33.8 MMHG (ref 35–45)
PCO2 BLDA: 34.2 MMHG (ref 35–45)
PCO2 BLDA: 38.7 MMHG (ref 35–45)
PCO2 BLDA: 46.1 MMHG (ref 35–45)
PEEP: 5
PH SMN: 7.37 [PH] (ref 7.35–7.45)
PH SMN: 7.42 [PH] (ref 7.35–7.45)
PH SMN: 7.48 [PH] (ref 7.35–7.45)
PH SMN: 7.51 [PH] (ref 7.35–7.45)
PHOSPHATE SERPL-MCNC: 3.2 MG/DL (ref 2.7–4.5)
PHOSPHATE SERPL-MCNC: 4.5 MG/DL (ref 2.7–4.5)
PLATELET # BLD AUTO: 129 K/UL (ref 150–350)
PLATELET # BLD AUTO: 136 K/UL (ref 150–350)
PLATELET # BLD AUTO: 143 K/UL (ref 150–350)
PLATELET # BLD AUTO: 156 K/UL (ref 150–350)
PLATELET # BLD AUTO: 160 K/UL (ref 150–350)
PLATELET # BLD AUTO: 160 K/UL (ref 150–350)
PLATELET # BLD AUTO: 171 K/UL (ref 150–350)
PLATELET BLD QL SMEAR: ABNORMAL
PMV BLD AUTO: 10.5 FL (ref 9.2–12.9)
PMV BLD AUTO: 10.6 FL (ref 9.2–12.9)
PMV BLD AUTO: 10.6 FL (ref 9.2–12.9)
PMV BLD AUTO: 10.8 FL (ref 9.2–12.9)
PMV BLD AUTO: 10.9 FL (ref 9.2–12.9)
PO2 BLDA: 119 MMHG (ref 80–100)
PO2 BLDA: 159 MMHG (ref 80–100)
PO2 BLDA: 31 MMHG (ref 40–60)
PO2 BLDA: 427 MMHG (ref 80–100)
POC BE: 1 MMOL/L
POC BE: 1 MMOL/L
POC BE: 2 MMOL/L
POC BE: 4 MMOL/L
POC IONIZED CALCIUM: 1.06 MMOL/L (ref 1.06–1.42)
POC SATURATED O2: 100 % (ref 95–100)
POC SATURATED O2: 100 % (ref 95–100)
POC SATURATED O2: 60 % (ref 95–100)
POC SATURATED O2: 99 % (ref 95–100)
POC TCO2: 26 MMOL/L (ref 23–27)
POC TCO2: 26 MMOL/L (ref 24–29)
POC TCO2: 28 MMOL/L (ref 23–27)
POC TCO2: 28 MMOL/L (ref 23–27)
POCT GLUCOSE: 114 MG/DL (ref 70–110)
POCT GLUCOSE: 126 MG/DL (ref 70–110)
POCT GLUCOSE: 128 MG/DL (ref 70–110)
POCT GLUCOSE: 141 MG/DL (ref 70–110)
POCT GLUCOSE: 145 MG/DL (ref 70–110)
POCT GLUCOSE: 149 MG/DL (ref 70–110)
POCT GLUCOSE: 154 MG/DL (ref 70–110)
POCT GLUCOSE: 156 MG/DL (ref 70–110)
POCT GLUCOSE: 165 MG/DL (ref 70–110)
POCT GLUCOSE: 166 MG/DL (ref 70–110)
POCT GLUCOSE: 172 MG/DL (ref 70–110)
POCT GLUCOSE: 174 MG/DL (ref 70–110)
POCT GLUCOSE: 176 MG/DL (ref 70–110)
POCT GLUCOSE: 178 MG/DL (ref 70–110)
POCT GLUCOSE: 179 MG/DL (ref 70–110)
POCT GLUCOSE: 182 MG/DL (ref 70–110)
POCT GLUCOSE: 183 MG/DL (ref 70–110)
POCT GLUCOSE: 187 MG/DL (ref 70–110)
POCT GLUCOSE: 203 MG/DL (ref 70–110)
POCT GLUCOSE: 209 MG/DL (ref 70–110)
POCT GLUCOSE: 225 MG/DL (ref 70–110)
POCT GLUCOSE: 231 MG/DL (ref 70–110)
POCT GLUCOSE: 243 MG/DL (ref 70–110)
POCT GLUCOSE: 287 MG/DL (ref 70–110)
POCT GLUCOSE: 83 MG/DL (ref 70–110)
POCT GLUCOSE: 91 MG/DL (ref 70–110)
POIKILOCYTOSIS BLD QL SMEAR: SLIGHT
POLYCHROMASIA BLD QL SMEAR: ABNORMAL
POTASSIUM BLD-SCNC: 3.1 MMOL/L (ref 3.5–5.1)
POTASSIUM SERPL-SCNC: 4.4 MMOL/L (ref 3.5–5.1)
POTASSIUM SERPL-SCNC: 5.3 MMOL/L (ref 3.5–5.1)
POTASSIUM SERPL-SCNC: 5.4 MMOL/L (ref 3.5–5.1)
POTASSIUM SERPL-SCNC: 5.6 MMOL/L (ref 3.5–5.1)
POTASSIUM SERPL-SCNC: 5.9 MMOL/L (ref 3.5–5.1)
POTASSIUM SERPL-SCNC: 5.9 MMOL/L (ref 3.5–5.1)
PROT SERPL-MCNC: 5.5 G/DL (ref 6–8.4)
PROT SERPL-MCNC: 5.6 G/DL (ref 6–8.4)
PROT SERPL-MCNC: 5.8 G/DL (ref 6–8.4)
PROT SERPL-MCNC: 5.9 G/DL (ref 6–8.4)
PROTHROMBIN TIME: 12.7 SEC (ref 9–12.5)
PROTHROMBIN TIME: 12.9 SEC (ref 9–12.5)
RBC # BLD AUTO: 2.44 M/UL (ref 4–5.4)
RBC # BLD AUTO: 2.68 M/UL (ref 4–5.4)
RBC # BLD AUTO: 2.72 M/UL (ref 4–5.4)
RBC # BLD AUTO: 2.78 M/UL (ref 4–5.4)
RBC # BLD AUTO: 2.79 M/UL (ref 4–5.4)
RBC # BLD AUTO: 2.83 M/UL (ref 4–5.4)
RBC # BLD AUTO: 2.89 M/UL (ref 4–5.4)
SAMPLE: ABNORMAL
SCHISTOCYTES BLD QL SMEAR: ABNORMAL
SCHISTOCYTES BLD QL SMEAR: ABNORMAL
SITE: ABNORMAL
SODIUM BLD-SCNC: 141 MMOL/L (ref 136–145)
SODIUM SERPL-SCNC: 136 MMOL/L (ref 136–145)
SODIUM SERPL-SCNC: 137 MMOL/L (ref 136–145)
SODIUM SERPL-SCNC: 137 MMOL/L (ref 136–145)
SODIUM SERPL-SCNC: 138 MMOL/L (ref 136–145)
TOXIC GRANULES BLD QL SMEAR: PRESENT
TOXIC GRANULES BLD QL SMEAR: PRESENT
VT: 400
WBC # BLD AUTO: 11.65 K/UL (ref 3.9–12.7)
WBC # BLD AUTO: 13.5 K/UL (ref 3.9–12.7)
WBC # BLD AUTO: 16.95 K/UL (ref 3.9–12.7)
WBC # BLD AUTO: 18.82 K/UL (ref 3.9–12.7)
WBC # BLD AUTO: 20.21 K/UL (ref 3.9–12.7)
WBC # BLD AUTO: 21.23 K/UL (ref 3.9–12.7)
WBC # BLD AUTO: 21.27 K/UL (ref 3.9–12.7)

## 2019-12-17 PROCEDURE — 63600175 PHARM REV CODE 636 W HCPCS: Performed by: INTERNAL MEDICINE

## 2019-12-17 PROCEDURE — 85027 COMPLETE CBC AUTOMATED: CPT

## 2019-12-17 PROCEDURE — 25000003 PHARM REV CODE 250: Performed by: INTERNAL MEDICINE

## 2019-12-17 PROCEDURE — 93005 ELECTROCARDIOGRAM TRACING: CPT

## 2019-12-17 PROCEDURE — 85384 FIBRINOGEN ACTIVITY: CPT

## 2019-12-17 PROCEDURE — P9016 RBC LEUKOCYTES REDUCED: HCPCS

## 2019-12-17 PROCEDURE — 63600367 HC NITRIC OXIDE PER HOUR

## 2019-12-17 PROCEDURE — 25000003 PHARM REV CODE 250: Performed by: STUDENT IN AN ORGANIZED HEALTH CARE EDUCATION/TRAINING PROGRAM

## 2019-12-17 PROCEDURE — 83735 ASSAY OF MAGNESIUM: CPT

## 2019-12-17 PROCEDURE — 85730 THROMBOPLASTIN TIME PARTIAL: CPT

## 2019-12-17 PROCEDURE — 63600175 PHARM REV CODE 636 W HCPCS: Performed by: SURGERY

## 2019-12-17 PROCEDURE — 85007 BL SMEAR W/DIFF WBC COUNT: CPT

## 2019-12-17 PROCEDURE — 83735 ASSAY OF MAGNESIUM: CPT | Mod: 91

## 2019-12-17 PROCEDURE — 20000000 HC ICU ROOM

## 2019-12-17 PROCEDURE — 80048 BASIC METABOLIC PNL TOTAL CA: CPT

## 2019-12-17 PROCEDURE — P9045 ALBUMIN (HUMAN), 5%, 250 ML: HCPCS | Mod: JG | Performed by: STUDENT IN AN ORGANIZED HEALTH CARE EDUCATION/TRAINING PROGRAM

## 2019-12-17 PROCEDURE — 63600175 PHARM REV CODE 636 W HCPCS: Mod: JG | Performed by: STUDENT IN AN ORGANIZED HEALTH CARE EDUCATION/TRAINING PROGRAM

## 2019-12-17 PROCEDURE — 63600175 PHARM REV CODE 636 W HCPCS: Performed by: STUDENT IN AN ORGANIZED HEALTH CARE EDUCATION/TRAINING PROGRAM

## 2019-12-17 PROCEDURE — 93010 ELECTROCARDIOGRAM REPORT: CPT | Mod: 76,,, | Performed by: INTERNAL MEDICINE

## 2019-12-17 PROCEDURE — 97802 MEDICAL NUTRITION INDIV IN: CPT

## 2019-12-17 PROCEDURE — 85610 PROTHROMBIN TIME: CPT | Mod: 91

## 2019-12-17 PROCEDURE — 99233 SBSQ HOSP IP/OBS HIGH 50: CPT | Mod: ,,, | Performed by: NURSE PRACTITIONER

## 2019-12-17 PROCEDURE — 83605 ASSAY OF LACTIC ACID: CPT

## 2019-12-17 PROCEDURE — 84100 ASSAY OF PHOSPHORUS: CPT | Mod: 91

## 2019-12-17 PROCEDURE — 94003 VENT MGMT INPAT SUBQ DAY: CPT

## 2019-12-17 PROCEDURE — 99900035 HC TECH TIME PER 15 MIN (STAT)

## 2019-12-17 PROCEDURE — 80053 COMPREHEN METABOLIC PANEL: CPT | Mod: 91

## 2019-12-17 PROCEDURE — 37799 UNLISTED PX VASCULAR SURGERY: CPT

## 2019-12-17 PROCEDURE — 84100 ASSAY OF PHOSPHORUS: CPT

## 2019-12-17 PROCEDURE — 25000003 PHARM REV CODE 250: Performed by: SURGERY

## 2019-12-17 PROCEDURE — 99233 PR SUBSEQUENT HOSPITAL CARE,LEVL III: ICD-10-PCS | Mod: ,,, | Performed by: NURSE PRACTITIONER

## 2019-12-17 PROCEDURE — 99233 PR SUBSEQUENT HOSPITAL CARE,LEVL III: ICD-10-PCS | Mod: ,,, | Performed by: SURGERY

## 2019-12-17 PROCEDURE — 27000221 HC OXYGEN, UP TO 24 HOURS

## 2019-12-17 PROCEDURE — 94761 N-INVAS EAR/PLS OXIMETRY MLT: CPT

## 2019-12-17 PROCEDURE — 99291 CRITICAL CARE FIRST HOUR: CPT | Mod: ,,, | Performed by: INTERNAL MEDICINE

## 2019-12-17 PROCEDURE — 80048 BASIC METABOLIC PNL TOTAL CA: CPT | Mod: 91

## 2019-12-17 PROCEDURE — 93010 ELECTROCARDIOGRAM REPORT: CPT | Mod: ,,, | Performed by: INTERNAL MEDICINE

## 2019-12-17 PROCEDURE — C9113 INJ PANTOPRAZOLE SODIUM, VIA: HCPCS | Performed by: SURGERY

## 2019-12-17 PROCEDURE — 82803 BLOOD GASES ANY COMBINATION: CPT

## 2019-12-17 PROCEDURE — 93010 EKG 12-LEAD: ICD-10-PCS | Mod: ,,, | Performed by: INTERNAL MEDICINE

## 2019-12-17 PROCEDURE — 25000242 PHARM REV CODE 250 ALT 637 W/ HCPCS: Performed by: STUDENT IN AN ORGANIZED HEALTH CARE EDUCATION/TRAINING PROGRAM

## 2019-12-17 PROCEDURE — 99233 SBSQ HOSP IP/OBS HIGH 50: CPT | Mod: ,,, | Performed by: SURGERY

## 2019-12-17 PROCEDURE — 85025 COMPLETE CBC W/AUTO DIFF WBC: CPT | Mod: 91

## 2019-12-17 PROCEDURE — 99900026 HC AIRWAY MAINTENANCE (STAT)

## 2019-12-17 PROCEDURE — 99291 PR CRITICAL CARE, E/M 30-74 MINUTES: ICD-10-PCS | Mod: ,,, | Performed by: INTERNAL MEDICINE

## 2019-12-17 PROCEDURE — 83050 HGB METHEMOGLOBIN QUAN: CPT

## 2019-12-17 RX ORDER — ALBUMIN HUMAN 50 G/1000ML
12.5 SOLUTION INTRAVENOUS ONCE
Status: COMPLETED | OUTPATIENT
Start: 2019-12-17 | End: 2019-12-17

## 2019-12-17 RX ORDER — ALBUTEROL SULFATE 2.5 MG/.5ML
10 SOLUTION RESPIRATORY (INHALATION) ONCE
Status: COMPLETED | OUTPATIENT
Start: 2019-12-17 | End: 2019-12-17

## 2019-12-17 RX ORDER — FUROSEMIDE 10 MG/ML
20 INJECTION INTRAMUSCULAR; INTRAVENOUS ONCE
Status: COMPLETED | OUTPATIENT
Start: 2019-12-17 | End: 2019-12-17

## 2019-12-17 RX ORDER — HYDROCODONE BITARTRATE AND ACETAMINOPHEN 500; 5 MG/1; MG/1
TABLET ORAL
Status: DISCONTINUED | OUTPATIENT
Start: 2019-12-17 | End: 2019-12-24

## 2019-12-17 RX ORDER — DEXMEDETOMIDINE HYDROCHLORIDE 4 UG/ML
0.2 INJECTION, SOLUTION INTRAVENOUS CONTINUOUS
Status: DISCONTINUED | OUTPATIENT
Start: 2019-12-17 | End: 2019-12-18

## 2019-12-17 RX ADMIN — EPINEPHRINE 0.03 MCG/KG/MIN: 1 INJECTION INTRAMUSCULAR; INTRAVENOUS; SUBCUTANEOUS at 10:12

## 2019-12-17 RX ADMIN — DEXTROSE 1000 MG: 5 SOLUTION INTRAVENOUS at 08:12

## 2019-12-17 RX ADMIN — MUPIROCIN 1 G: 20 OINTMENT TOPICAL at 08:12

## 2019-12-17 RX ADMIN — INSULIN HUMAN 10 UNITS: 100 INJECTION, SOLUTION PARENTERAL at 09:12

## 2019-12-17 RX ADMIN — ALBUTEROL SULFATE 10 MG: 2.5 SOLUTION RESPIRATORY (INHALATION) at 10:12

## 2019-12-17 RX ADMIN — PROPOFOL 35 MCG/KG/MIN: 10 INJECTION, EMULSION INTRAVENOUS at 01:12

## 2019-12-17 RX ADMIN — ASPIRIN 325 MG ORAL TABLET 325 MG: 325 PILL ORAL at 08:12

## 2019-12-17 RX ADMIN — DEXTROSE: 50 INJECTION, SOLUTION INTRAVENOUS at 05:12

## 2019-12-17 RX ADMIN — INSULIN HUMAN 10 UNITS: 100 INJECTION, SOLUTION PARENTERAL at 10:12

## 2019-12-17 RX ADMIN — DEXTROSE: 50 INJECTION, SOLUTION INTRAVENOUS at 01:12

## 2019-12-17 RX ADMIN — ALBUMIN (HUMAN) 12.5 G: 12.5 SOLUTION INTRAVENOUS at 08:12

## 2019-12-17 RX ADMIN — CEFAZOLIN 2 G: 1 INJECTION, POWDER, FOR SOLUTION INTRAMUSCULAR; INTRAVENOUS at 11:12

## 2019-12-17 RX ADMIN — PANTOPRAZOLE SODIUM 40 MG: 40 INJECTION, POWDER, FOR SOLUTION INTRAVENOUS at 03:12

## 2019-12-17 RX ADMIN — FENTANYL CITRATE 25 MCG: 50 INJECTION INTRAMUSCULAR; INTRAVENOUS at 07:12

## 2019-12-17 RX ADMIN — EPINEPHRINE 0.06 MCG/KG/MIN: 1 INJECTION INTRAMUSCULAR; INTRAVENOUS; SUBCUTANEOUS at 07:12

## 2019-12-17 RX ADMIN — DEXMEDETOMIDINE HYDROCHLORIDE 1.4 MCG/KG/HR: 4 INJECTION, SOLUTION INTRAVENOUS at 07:12

## 2019-12-17 RX ADMIN — DEXTROSE 250 ML: 10 SOLUTION INTRAVENOUS at 08:12

## 2019-12-17 RX ADMIN — DEXTROSE 250 ML: 10 SOLUTION INTRAVENOUS at 10:12

## 2019-12-17 RX ADMIN — FENTANYL CITRATE 25 MCG: 50 INJECTION INTRAMUSCULAR; INTRAVENOUS at 04:12

## 2019-12-17 RX ADMIN — CEFAZOLIN 2 G: 1 INJECTION, POWDER, FOR SOLUTION INTRAMUSCULAR; INTRAVENOUS at 04:12

## 2019-12-17 RX ADMIN — CEFAZOLIN 2 G: 1 INJECTION, POWDER, FOR SOLUTION INTRAMUSCULAR; INTRAVENOUS at 08:12

## 2019-12-17 RX ADMIN — POLYETHYLENE GLYCOL 3350 17 G: 17 POWDER, FOR SOLUTION ORAL at 08:12

## 2019-12-17 RX ADMIN — METHYLPREDNISOLONE SODIUM SUCCINATE 40 MG: 40 INJECTION, POWDER, FOR SOLUTION INTRAMUSCULAR; INTRAVENOUS at 08:12

## 2019-12-17 RX ADMIN — CALCIUM GLUCONATE 1000 MG: 98 INJECTION, SOLUTION INTRAVENOUS at 09:12

## 2019-12-17 RX ADMIN — FENTANYL CITRATE 25 MCG: 50 INJECTION INTRAMUSCULAR; INTRAVENOUS at 08:12

## 2019-12-17 RX ADMIN — PROPOFOL 50 MCG/KG/MIN: 10 INJECTION, EMULSION INTRAVENOUS at 05:12

## 2019-12-17 RX ADMIN — OXYCODONE HYDROCHLORIDE 10 MG: 10 TABLET ORAL at 11:12

## 2019-12-17 RX ADMIN — CALCIUM GLUCONATE 1 G: 98 INJECTION, SOLUTION INTRAVENOUS at 10:12

## 2019-12-17 RX ADMIN — FENTANYL CITRATE 25 MCG: 50 INJECTION INTRAMUSCULAR; INTRAVENOUS at 03:12

## 2019-12-17 RX ADMIN — DEXMEDETOMIDINE HYDROCHLORIDE 1.4 MCG/KG/HR: 4 INJECTION, SOLUTION INTRAVENOUS at 09:12

## 2019-12-17 RX ADMIN — DEXMEDETOMIDINE HYDROCHLORIDE 0.2 MCG/KG/HR: 4 INJECTION, SOLUTION INTRAVENOUS at 02:12

## 2019-12-17 RX ADMIN — FUROSEMIDE 20 MG: 10 INJECTION, SOLUTION INTRAMUSCULAR; INTRAVENOUS at 09:12

## 2019-12-17 NOTE — PT/OT/SLP PROGRESS
Physical Therapy      Patient Name:  Deborah Navas   MRN:  [id number]    Patient not seen today. Pt remains intubated s/p OHT 12/16/19. Will follow up when appropriate.     Niurka Posadas, PT, DPT  12/17/2019  [phone]

## 2019-12-17 NOTE — PROGRESS NOTES
"[facility]  Endocrinology  Progress Note    Admit Date: 11/13/2019     Reason for Consult: Management of Post transplant Hyperglycemia     Surgical Procedure and Date:   Heart Transplant 12/16/2019      HPI:   Patient is a 49 y.o. female with a diagnosis of NICM, s/p HM3 implantation 9/10/19, V-fib reported in setting of hypokalemia with appropriate shock from ICD on Amiodarone prior to LVAD placement. Patient is now s/p heart transplant. Endocrinology consulted to manage hyperglycemia s/p heart surgery.     Lab Results   Component Value Date    HGBA1C 6.5 (H) 09/06/2019             Interval HPI:   Overnight events:   Patient remains intubated in SICU. BG is within goal on current intensive IV insulin infusion.   No diet orders on file    Eating:   NPO  Nausea: No  Hypoglycemia and intervention: No  Fever: No  TPN and/or TF: No  If yes, type of TF/TPN and rate: None    /71   Pulse (!) 111   Temp 98.4 °F (36.9 °C) (Oral)   Resp (!) 21   Ht 5' 6" (1.676 m)   Wt 100.1 kg (220 lb 10.9 oz)   LMP  (LMP Unknown)   SpO2 100%   Breastfeeding? No   BMI 35.62 kg/m²      Labs Reviewed and Include    Recent Labs   Lab 12/17/19  1150   *   CALCIUM 9.3   ALBUMIN 3.6   PROT 5.5*      K 5.3*   CO2 22*      BUN 34*   CREATININE 2.9*   ALKPHOS 43*   ALT 79*   *   BILITOT 0.5     Lab Results   Component Value Date    WBC 21.27 (H) 12/17/2019    HGB 7.3 (L) 12/17/2019    HCT 23.0 (L) 12/17/2019    MCV 86 12/17/2019     12/17/2019     No results for input(s): TSH, FREET4 in the last 168 hours.  Lab Results   Component Value Date    HGBA1C 6.5 (H) 09/06/2019       Nutritional status:   Body mass index is 35.62 kg/m².  Lab Results   Component Value Date    ALBUMIN 3.6 12/17/2019    ALBUMIN 3.8 12/17/2019    ALBUMIN 3.5 12/16/2019     Lab Results   Component Value Date    PREALBUMIN 27 12/16/2019    PREALBUMIN 28 12/13/2019    PREALBUMIN 29 12/11/2019       Estimated " Creatinine Clearance: 27.7 mL/min (A) (based on SCr of 2.9 mg/dL (H)).    Accu-Checks  Recent Labs     12/17/19  0803 12/17/19  0859 12/17/19  1005 12/17/19  1036 12/17/19  1059 12/17/19  1125 12/17/19  1149 12/17/19  1309 12/17/19  1409 12/17/19  1500   POCTGLUCOSE 179* 176* 187* 287* 231* 209* 203* 165* 183* 166*       Current Medications and/or Treatments Impacting Glycemic Control  Immunotherapy:    Immunosuppressants         Stop Route Frequency     mycophenolate (CELLCEPT) 1,000 mg in dextrose 5 % 100 mL IVPB      -- IV Every 12 hours        Steroids:   Hormones (From admission, onward)    Start     Stop Route Frequency Ordered    12/17/19 2000  methylPREDNISolone sodium succinate injection 40 mg      -- IV Every 12 hours 12/17/19 0917    12/16/19 1315  vasopressin (PITRESSIN) 0.2 Units/mL in dextrose 5 % 100 mL infusion      -- IV Continuous 12/16/19 1214        Pressors:    Autonomic Drugs (From admission, onward)    Start     Stop Route Frequency Ordered    12/16/19 1315  EPINEPHrine (ADRENALIN) 5 mg in sodium chloride 0.9% 250 mL infusion     Question Answer Comment   Titrate by: (in mcg/kg/min) 0.01    Titrate interval: (in minutes) 5    Titrate to maintain: (SBP or MAP or Cardiac Index) SBP    Maximum dose: (in mcg/kg/min) 2        -- IV Continuous 12/16/19 1214    12/16/19 1315  norepinephrine 4 mg in dextrose 5% 250 mL infusion (premix) (titrating)     Question Answer Comment   Titrate by: (in mcg/kg/min) 0.05    Titrate interval: (in minutes) 5    Titrate to maintain: (MAP or SBP) MAP    Greater than: (in mmHg) 65    Maximum dose: (in mcg/kg/min) 3        -- IV Continuous 12/16/19 1214        Hyperglycemia/Diabetes Medications:   Antihyperglycemics (From admission, onward)    Start     Stop Route Frequency Ordered    12/16/19 1315  insulin regular (Humulin R) 100 Units in sodium chloride 0.9% 100 mL infusion     Question:  Insulin rate changes (DO NOT MODIFY ANSWER)  Answer:   [email or web address]    -- IV Continuous 12/16/19 1214          ASSESSMENT and PLAN    * Status post heart transplant  Managed per primary team  Avoid hypoglycemia        Stress hyperglycemia  BG goal 140 - 180     Continue IV insulin infusion protocol  Requires intensive BG monitoring while on protocol     ** Please call Endocrine for any BG related issues **  ** Please notify Endocrine for any change and/or advance in diet**      Discharge planning: TBD        Adverse effect of adrenal cortical steroids, sequela  On steroid therapy per transplant team; may elevate BG readings        CKD (chronic kidney disease)  Titrate insulin slowly to avoid hypoglycemia as the risk of hypoglycemia increases with decreased creatinine clearance.            Sonny Boland, NP  Endocrinology  [facility]

## 2019-12-17 NOTE — PT/OT/SLP PROGRESS
Occupational Therapy      Patient Name:  Deborah Navas   MRN:  [id number]    Patient not seen today secondary to remains intubated with goal to extubate tomorrow per MD notes  . Will follow-up 12/18/19.    JAGJIT Byrne  12/17/2019

## 2019-12-17 NOTE — PLAN OF CARE
Problem: Adult Inpatient Plan of Care  Goal: Plan of Care Review  Outcome: Ongoing, Progressing   Patient remains intubated on A/C mode of ventilation, FiO2 40%, 5 of PEEP, rate of 20; Nitric Oxide 10 ppm.  Gtts/Infusions:  Epi gtt @ 0.06 mcg/kg/min, Levo gtt @ 0.02 mcg/kg/min, Vaso gtt @ 0.04 units/min, & Propofol gtt @ 50 mcg/kg/min.  Insulin gtt turned off overnight according to algorithm; still q1hr BG (will restart if BG reaches > 180 two consecutive times in a row).  CVP 14-16 mmHg overnight.  UOP 15-20 mL/hr; MD aware.  A total of 250mL of 5% Albumin administered overnight; 1 unit of PRBCs transfused for Hgb 6.5 g/dL.  Please see I&O flowsheet for chest tube output.  Patient wakes up and follows commands on all 4 extremities; nods and gestures appropriately.  CTS notified about the patient's renal labs and UOP.  Safety maintained overnight.  Patient remained free from falls, injury, and skin breakdown overnight.  Plan of care reviewed with the patient.

## 2019-12-17 NOTE — PROGRESS NOTES
Update:    BENIGNO met with pt's caregiver Flavia Nuñez in pt's room in order to follow up with pt's post-surgery status after heart transplant on 12/16/19. Pt remains intubated and asleep. Pt's caregiver states that pt is doing well from heart standpoint but that kidneys are having some trouble. Caregiver reports adequate coping despite some of pt's medical issues. Caregiver requesting letter for work as she was scheduled to work tomorrow and Thurs but will be staying with pt in the hospital. BENIGNO provided caregiver with letter. Per caregiver, pt's mother has bronchitis and will not be able to return to visit pt until cleared by her doctor. Caregiver states that mother having some anxiety over not being able to be with pt in the hospital.     BENIGNO provided caregiver with information on [place] and financial profile to give to pt once she is extubated and AAOx4. BENIGNO requested that pt be placed on waitlist at [facility].     SW provided space for caregiver to ask questions/voice concerns. Caregiver denying any current needs at this time. SW to visit with pt and caregiver again tomorrow. BENIGNO remains available for continued psychosocial support, education, resources, and additional d/c planning as needed.

## 2019-12-17 NOTE — PROGRESS NOTES
[facility]  Critical Care - Surgery  Progress Note    Patient Name: Deborah Navas  MRN: [id number]  Admission Date: 11/13/2019  Hospital Length of Stay: 32 days  Code Status: Prior  Attending Provider: Talha Nuñez MD  Primary Care Provider: Primary Doctor No   Principal Problem: Status post heart transplant    Subjective:     Hospital/ICU Course:  12/16 - Cardiac transplant, Arrived to SICU vaso, epi, and Levo. Intubated goal to extubate tomorrow    Interval History/Significant Events:   MAPS over 65  Now at Epi 0.06 + Levo 0.01 + Vaso 0.04  One unit of blood transfused overnight.   Low UOP increase creatinine to 2.5 (Normal range 1.8-2.5)  Minimal vent settings, NO.     Follow-up For: Procedure(s) (LRB):  TRANSPLANT, HEART (N/A)    Post-Operative Day: 1 Day Post-Op    Objective:     Vital Signs (Most Recent):  Temp: 99.7 °F (37.6 °C) (12/17/19 0700)  Pulse: 110 (12/17/19 0800)  Resp: (!) 22 (12/17/19 0800)  BP: (!) 89/53 (12/17/19 0800)  SpO2: 99 % (12/17/19 0800) Vital Signs (24h Range):  Temp:  [97.6 °F (36.4 °C)-99.7 °F (37.6 °C)] 99.7 °F (37.6 °C)  Pulse:  [] 110  Resp:  [22-28] 22  SpO2:  [98 %-100 %] 99 %  BP: ()/(51-67) 89/53  Arterial Line BP: ()/() 87/50     Weight: 100.1 kg (220 lb 10.9 oz)  Body mass index is 35.62 kg/m².      Intake/Output Summary (Last 24 hours) at 12/17/2019 0817  Last data filed at 12/17/2019 0800  Gross per 24 hour   Intake 7973 ml   Output 2091 ml   Net 5882 ml       Physical Exam   Constitutional: She appears well-developed and well-nourished. No distress.   HENT:   Head: Normocephalic and atraumatic.   Eyes: Conjunctivae are normal.   Neck: Neck supple.   Cardiovascular:   Atrial paced at 110.    Pulmonary/Chest: Effort normal.   Chest tubes intact. Serosanguinous drainage. Pacing wires in place.    Abdominal: Soft. There is no tenderness.   Musculoskeletal: She exhibits no deformity.   Right femoral access site covered  with dressing. No issues.    Neurological: She is alert.   Skin: Skin is warm and dry.   Psychiatric:   Unable to assess.    Nursing note and vitals reviewed.       Vents:  Vent Mode: A/C (12/17/19 0730)  Set Rate: 20 bmp (12/17/19 0730)  Vt Set: 400 mL (12/17/19 0730)  PEEP/CPAP: 5 cmH20 (12/17/19 0730)  Oxygen Concentration (%): 40 (12/17/19 0800)  Peak Airway Pressure: 27 cmH2O (12/17/19 0730)  Plateau Pressure: 0 cmH20 (12/17/19 0730)  Total Ve: 9.43 mL (12/17/19 0730)  F/VT Ratio<105 (RSBI): (!) 54.86 (12/17/19 0730)    Lines/Drains/Airways     Central Venous Catheter Line                 Introducer 12/16/19 0325 left internal jugular 1 day         Percutaneous Central Line Insertion/Assessment - double lumen  12/16/19 0325 1 day         Percutaneous Central Line Insertion/Assessment - triple lumen  12/16/19 0330 left internal jugular 1 day          Drain                 Urethral Catheter 12/16/19 0332 Temperature probe 14 Fr. 1 day         Y Chest Tube 1 and 2 12/16/19 1027 1 Right Mediastinal 19 Fr. 2 Left Mediastinal 19 Fr. less than 1 day         Y Chest Tube 3 and 4 12/16/19 1030 3 Right Pleural 19 Fr. 4 Left Pleural 19 Fr. less than 1 day          Airway                 Airway - Non-Surgical 12/16/19 0327 Endotracheal Tube 1 day          Arterial Line                 Arterial Line 12/16/19 0300 Left Other (Comment) 1 day          Line                 Pacer Wires 12/16/19 1004 less than 1 day          Peripheral Intravenous Line                 Midline Catheter Insertion/Assessment  - Single Lumen 12/04/19 1552 Right cephalic vein (lateral side of arm) 18g x 8cm 12 days                Significant Labs:    CBC/Anemia Profile:  Recent Labs   Lab 12/16/19  1613 12/17/19  0102 12/17/19  0459   WBC 11.65 13.50* 16.95*   HGB 7.8* 6.5* 7.4*   HCT 24.9* 20.8* 23.6*   * 136* 143*   MCV 86 85 87   RDW 16.8* 17.0* 16.9*        Chemistries:  Recent Labs   Lab 12/16/19  0158 12/16/19  1015 12/16/19  1234  12/16/19  1830 12/17/19  0102 12/17/19  0459    141 140 139 138 137   K 4.2 3.2* 3.4*  3.4* 5.4* 4.4 5.4*   CL 97 101 101 103 102 101   CO2 32* 20* 21* 24 25 24   BUN 22* 20 19 22* 26* 28*   CREATININE 2.0* 1.8* 1.7* 2.0* 2.2* 2.5*   CALCIUM 9.2 8.6* 8.7 9.1 9.3 9.1   ALBUMIN 3.5 2.8*  --  3.5  --   --    PROT 7.0 4.6*  --  5.4*  --   --    BILITOT 0.5 0.6  --  0.8  --   --    ALKPHOS 87 44*  --  43*  --   --    ALT 14 13  --  20  --   --    AST 18 44*  --  68*  --   --    MG 2.4  --  3.1* 2.9* 3.1* 3.0*   PHOS 4.1  --  2.2*  --  3.2 4.5       All pertinent labs within the past 24 hours have been reviewed.    Significant Imaging:  I have reviewed and interpreted all pertinent imaging results/findings within the past 24 hours.    Assessment/Plan:     Heart transplant candidate  49-year-old female with NICM s/p LVAD September 2019 now s/p heart transplant 12/16/19. Arrived intubated on levo, vaso, epi, and NO. Doing well.     Neuro:   -Fentanyl for pain  -propofol for sedation while intubated  -full neuro exam     Cards:  -monitor hemodynamics  -monitor chest tube output, high initially but slowing down   - Left chest tube: 168cc   - Right chest tube: 830cc  -wean vaso, levo, and NO as tolerated  -MAP goal 60-80    Resp:  -wean to extubate today  -Minimal vent settings  -ABG as needed    Renal:  -monitor urine output  -trend BUN and creatinine  -BUN/Cr 26/2.5    Heme:  -trend H/H   - Received 1uRBC 12/17/19  -monitor chest tube output    FENGI:  -NPO for now  -replace lytes as needed  -GI ppx    Endo:  -monitor POC glucose  -insulin gtt for time being    ID:  -monitor WBC  -perioperative abx    Dispo: continue ICU care. Extubate today               Critical care was time spent personally by me on the following activities: development of treatment plan with patient or surrogate and bedside caregivers, discussions with consultants, evaluation of patient's response to treatment, examination of patient, ordering  and performing treatments and interventions, ordering and review of laboratory studies, ordering and review of radiographic studies, pulse oximetry, re-evaluation of patient's condition.  This critical care time did not overlap with that of any other provider or involve time for any procedures.     Declan Mcgee MD  Critical Care - Surgery  [facility]

## 2019-12-17 NOTE — ASSESSMENT & PLAN NOTE
- Baseline creat appears 1.3-1.6.   - CVP 15 after albumin and blood transfusion, would recommend IV lasix dose  -will hold off on tac with Cr now 2.9

## 2019-12-17 NOTE — NURSING
Dr. Watt at the patient's bedside.  Updated on UOP 15-18mL/hr still despite Albumin.  CVP 15 mmHg with HOB flat.  New orders to send full set of labs and obtain an ABG with lactate.  Will carry out new orders.  RT notified for ABG.  Will continue to monitor patient.

## 2019-12-17 NOTE — SUBJECTIVE & OBJECTIVE
Interval History: s/p OHT POD 1, remains intubated and sedated but responds to commands     Continuous Infusions:   dexmedetomidine (PRECEDEX) infusion      epinephrine 0.8 mcg/kg/min (12/17/19 1300)    insulin (HUMAN R) infusion (adults) Stopped (12/17/19 0300)    niCARdipine      nitric oxide gas      norepinephrine bitartrate-D5W Stopped (12/17/19 1300)    propofol 40 mcg/kg/min (12/17/19 1300)    vasopressin (PITRESSIN) infusion Stopped (12/17/19 0830)     Scheduled Meds:   aspirin  325 mg Oral Daily    ceFAZolin (ANCEF) IVPB  2 g Intravenous Q8H    methylPREDNISolone sodium succinate  40 mg Intravenous Q12H    mupirocin  1 g Nasal BID    mycophenolate (CELLCEPT) IVPB  1,000 mg Intravenous Q12H    pantoprazole  40 mg Intravenous Daily    polyethylene glycol  17 g Oral Daily     PRN Meds:sodium chloride, acetaminophen, acetaminophen, bisacodyl, calcium gluconate IVPB, calcium gluconate IVPB, calcium gluconate IVPB, [COMPLETED] calcium gluconate IVPB **AND** calcium gluconate IVPB, Dextrose 10% Bolus, Dextrose 10% Bolus, [COMPLETED] Dextrose 10% Bolus **AND** Dextrose 10% Bolus **AND** [COMPLETED] insulin regular, fentaNYL, magnesium sulfate IVPB, magnesium sulfate IVPB, ondansetron, ondansetron, oxyCODONE, oxyCODONE, potassium chloride in water **AND** potassium chloride in water **AND** potassium chloride in water, sodium phosphate IVPB, sodium phosphate IVPB, sodium phosphate IVPB    Review of patient's allergies indicates:   Allergen Reactions    Adhesive Blisters     Reaction to area in chest and up only    Codeine Itching     Objective:     Vital Signs (Most Recent):  Temp: (!) 100.4 °F (38 °C) (12/17/19 1100)  Pulse: (!) 111 (12/17/19 1345)  Resp: (!) 23 (12/17/19 1345)  BP: 109/68 (12/17/19 1300)  SpO2: 99 % (12/17/19 1345) Vital Signs (24h Range):  Temp:  [97.7 °F (36.5 °C)-100.4 °F (38 °C)] 100.4 °F (38 °C)  Pulse:  [] 111  Resp:  [21-28] 23  SpO2:  [99 %-100 %] 99 %  BP:  ()/(51-68) 109/68  Arterial Line BP: ()/() 97/56     Patient Vitals for the past 72 hrs (Last 3 readings):   Weight   12/16/19 0205 100.1 kg (220 lb 10.9 oz)   12/15/19 1000 101.2 kg (223 lb)   12/15/19 0305 101.6 kg (223 lb 15.8 oz)     Body mass index is 35.62 kg/m².      Intake/Output Summary (Last 24 hours) at 12/17/2019 1405  Last data filed at 12/17/2019 1300  Gross per 24 hour   Intake 3094 ml   Output 1106 ml   Net 1988 ml       Hemodynamic Parameters:           Physical Exam   Constitutional: She appears well-developed and well-nourished. No distress.   HENT:   Head: Normocephalic and atraumatic.   Eyes: Conjunctivae are normal.   Neck: Neck supple.   Cardiovascular:   Atrial paced at 110.    Pulmonary/Chest: Effort normal.   Chest tubes intact. Serosanguinous drainage. Pacing wires in place.    Abdominal: Soft. There is no tenderness.   Musculoskeletal: She exhibits no deformity.   Right femoral access site covered with dressing. No issues.    Neurological: She is alert.   Skin: Skin is warm and dry.   Psychiatric:   Unable to assess.    Nursing note and vitals reviewed.      Significant Labs:  CBC:  Recent Labs   Lab 12/17/19  0459 12/17/19  0816 12/17/19  1150   WBC 16.95* 18.82* 21.27*   RBC 2.72* 2.79* 2.68*   HGB 7.4* 7.5* 7.3*   HCT 23.6* 24.2* 23.0*   * 156 160   MCV 87 87 86   MCH 27.2 26.9* 27.2   MCHC 31.4* 31.0* 31.7*     BNP:  Recent Labs   Lab 12/11/19  0500 12/13/19  0400 12/16/19  0158   * 503* 271*     CMP:  Recent Labs   Lab 12/16/19  1830  12/17/19  0459 12/17/19  0816 12/17/19  1150   *   < > 147* 188* 215*   CALCIUM 9.1   < > 9.1 8.9 9.3   ALBUMIN 3.5  --   --  3.8 3.6   PROT 5.4*  --   --  5.6* 5.5*      < > 137 137 136   K 5.4*   < > 5.4* 5.9* 5.3*   CO2 24   < > 24 22* 22*      < > 101 101 102   BUN 22*   < > 28* 30* 34*   CREATININE 2.0*   < > 2.5* 2.5* 2.9*   ALKPHOS 43*  --   --  46* 43*   ALT 20  --   --  38 79*   AST 68*  --    --  101* 176*   BILITOT 0.8  --   --  0.7 0.5    < > = values in this interval not displayed.      Coagulation:   Recent Labs   Lab 12/16/19  1613 12/17/19  0102 12/17/19  0459   INR 1.3* 1.3* 1.3*   APTT 27.4 23.1 24.5     LDH:  Recent Labs   Lab 12/15/19  0315 12/16/19  0158    240     Microbiology:  Microbiology Results (last 7 days)     Procedure Component Value Units Date/Time    Urine Culture High Risk [[id number]] Collected:  12/15/19 0646    Order Status:  Completed Specimen:  Urine, Catheterized Updated:  12/16/19 0840     Urine Culture, Routine No significant growth    Narrative:       Indicated criteria for high risk culture:->Other  Other (specify):->awaiting heart transplant    Urine culture [[id number]] Collected:  12/15/19 0646    Order Status:  Canceled Specimen:  Urine, Catheterized           I have reviewed all pertinent labs within the past 24 hours.    Estimated Creatinine Clearance: 27.7 mL/min (A) (based on SCr of 2.9 mg/dL (H)).    Diagnostic Results:  I have reviewed and interpreted all pertinent imaging results/findings within the past 24 hours.

## 2019-12-17 NOTE — PROGRESS NOTES
[facility]  Heart Transplant  Progress Note    Patient Name: Deborah Navas  MRN: [id number]  Admission Date: 11/13/2019  Hospital Length of Stay: 32 days  Attending Physician: Talha Nuñez MD  Primary Care Provider: Primary Doctor No  Principal Problem:Status post heart transplant    Subjective:     Interval History: s/p OHT POD 1, remains intubated and sedated but responds to commands     Continuous Infusions:   dexmedetomidine (PRECEDEX) infusion      epinephrine 0.8 mcg/kg/min (12/17/19 1300)    insulin (HUMAN R) infusion (adults) Stopped (12/17/19 0300)    niCARdipine      nitric oxide gas      norepinephrine bitartrate-D5W Stopped (12/17/19 1300)    propofol 40 mcg/kg/min (12/17/19 1300)    vasopressin (PITRESSIN) infusion Stopped (12/17/19 0830)     Scheduled Meds:   aspirin  325 mg Oral Daily    ceFAZolin (ANCEF) IVPB  2 g Intravenous Q8H    methylPREDNISolone sodium succinate  40 mg Intravenous Q12H    mupirocin  1 g Nasal BID    mycophenolate (CELLCEPT) IVPB  1,000 mg Intravenous Q12H    pantoprazole  40 mg Intravenous Daily    polyethylene glycol  17 g Oral Daily     PRN Meds:sodium chloride, acetaminophen, acetaminophen, bisacodyl, calcium gluconate IVPB, calcium gluconate IVPB, calcium gluconate IVPB, [COMPLETED] calcium gluconate IVPB **AND** calcium gluconate IVPB, Dextrose 10% Bolus, Dextrose 10% Bolus, [COMPLETED] Dextrose 10% Bolus **AND** Dextrose 10% Bolus **AND** [COMPLETED] insulin regular, fentaNYL, magnesium sulfate IVPB, magnesium sulfate IVPB, ondansetron, ondansetron, oxyCODONE, oxyCODONE, potassium chloride in water **AND** potassium chloride in water **AND** potassium chloride in water, sodium phosphate IVPB, sodium phosphate IVPB, sodium phosphate IVPB    Review of patient's allergies indicates:   Allergen Reactions    Adhesive Blisters     Reaction to area in chest and up only    Codeine Itching     Objective:     Vital Signs (Most  Recent):  Temp: (!) 100.4 °F (38 °C) (12/17/19 1100)  Pulse: (!) 111 (12/17/19 1345)  Resp: (!) 23 (12/17/19 1345)  BP: 109/68 (12/17/19 1300)  SpO2: 99 % (12/17/19 1345) Vital Signs (24h Range):  Temp:  [97.7 °F (36.5 °C)-100.4 °F (38 °C)] 100.4 °F (38 °C)  Pulse:  [] 111  Resp:  [21-28] 23  SpO2:  [99 %-100 %] 99 %  BP: ()/(51-68) 109/68  Arterial Line BP: ()/() 97/56     Patient Vitals for the past 72 hrs (Last 3 readings):   Weight   12/16/19 0205 100.1 kg (220 lb 10.9 oz)   12/15/19 1000 101.2 kg (223 lb)   12/15/19 0305 101.6 kg (223 lb 15.8 oz)     Body mass index is 35.62 kg/m².      Intake/Output Summary (Last 24 hours) at 12/17/2019 1405  Last data filed at 12/17/2019 1300  Gross per 24 hour   Intake 3094 ml   Output 1106 ml   Net 1988 ml       Hemodynamic Parameters:           Physical Exam   Constitutional: She appears well-developed and well-nourished. No distress.   HENT:   Head: Normocephalic and atraumatic.   Eyes: Conjunctivae are normal.   Neck: Neck supple.   Cardiovascular:   Atrial paced at 110.    Pulmonary/Chest: Effort normal.   Chest tubes intact. Serosanguinous drainage. Pacing wires in place.    Abdominal: Soft. There is no tenderness.   Musculoskeletal: She exhibits no deformity.   Right femoral access site covered with dressing. No issues.    Neurological: She is alert.   Skin: Skin is warm and dry.   Psychiatric:   Unable to assess.    Nursing note and vitals reviewed.      Significant Labs:  CBC:  Recent Labs   Lab 12/17/19  0459 12/17/19  0816 12/17/19  1150   WBC 16.95* 18.82* 21.27*   RBC 2.72* 2.79* 2.68*   HGB 7.4* 7.5* 7.3*   HCT 23.6* 24.2* 23.0*   * 156 160   MCV 87 87 86   MCH 27.2 26.9* 27.2   MCHC 31.4* 31.0* 31.7*     BNP:  Recent Labs   Lab 12/11/19  0500 12/13/19  0400 12/16/19  0158   * 503* 271*     CMP:  Recent Labs   Lab 12/16/19  1830  12/17/19  0459 12/17/19  0816 12/17/19  1150   *   < > 147* 188* 215*   CALCIUM 9.1   <  > 9.1 8.9 9.3   ALBUMIN 3.5  --   --  3.8 3.6   PROT 5.4*  --   --  5.6* 5.5*      < > 137 137 136   K 5.4*   < > 5.4* 5.9* 5.3*   CO2 24   < > 24 22* 22*      < > 101 101 102   BUN 22*   < > 28* 30* 34*   CREATININE 2.0*   < > 2.5* 2.5* 2.9*   ALKPHOS 43*  --   --  46* 43*   ALT 20  --   --  38 79*   AST 68*  --   --  101* 176*   BILITOT 0.8  --   --  0.7 0.5    < > = values in this interval not displayed.      Coagulation:   Recent Labs   Lab 12/16/19  1613 12/17/19  0102 12/17/19  0459   INR 1.3* 1.3* 1.3*   APTT 27.4 23.1 24.5     LDH:  Recent Labs   Lab 12/15/19  0315 12/16/19  0158    240     Microbiology:  Microbiology Results (last 7 days)     Procedure Component Value Units Date/Time    Urine Culture High Risk [[id number]] Collected:  12/15/19 0646    Order Status:  Completed Specimen:  Urine, Catheterized Updated:  12/16/19 0840     Urine Culture, Routine No significant growth    Narrative:       Indicated criteria for high risk culture:->Other  Other (specify):->awaiting heart transplant    Urine culture [[id number]] Collected:  12/15/19 0646    Order Status:  Canceled Specimen:  Urine, Catheterized           I have reviewed all pertinent labs within the past 24 hours.    Estimated Creatinine Clearance: 27.7 mL/min (A) (based on SCr of 2.9 mg/dL (H)).    Diagnostic Results:  I have reviewed and interpreted all pertinent imaging results/findings within the past 24 hours.    Assessment and Plan:     48 yo WF with NICM, s/p HM3 implantation 9/10/19 (post up coarse uncomplicated with the exception of+ diaphragmatic stimulation of LV lead and pleural effusion with chest tube placement, atrial flutter with NADINE/DCCV), V-fib reported in setting of hypokalemia with appropriate shock from ICD on Amiodarone prior to LVAD placement; and recent hospitalizations for ventricular arrythmias; started on Mexilitine.  She was seen in EP clinic today and she continues to have multiple VT episodes with some  ATP therapy, although no ICD shocks since starting mexiletine 10/24/2019. One slow VT episode 2.5hrs 11/3/2019. Patient asymptomatic with LVAD. On coumadin. No AFL since post-op event (paced out of rhythm 9/24/2019). CHB with 100% V pacing (LV lead off). She does not feel well. Dry heaving with mexiletine. Taking phenergan PRN. 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   She was seen in HTS clinic and reported 4 low flow alarms the last 4 nights.  She reports they occur in her sleep and last for only a few seconds.  By the time she wakes up; they quickly stop.  She does report to possibly being little volume overloaded.  She hasn't noticed weight gain at home but thinks she may have little extra fluid.  She denies SOB, chest pain, palpitations, LEGER. In review of her records: she was 223lbs on 10/24/19 during previous hospitalization and is 235lbs now.  Her lasix had previously been decreased to prn.     * Status post heart transplant  -Transplant Date 12/16/2019  -[facility]  -Transplanted by: Dr. Nuñez  -CMV Status:D-/R+   -Rejection status: Low Risk  -Hemodynamic support with:  and levophed, vaso off  -will hold off on tac with renal function and consider thymo  -Current immunosuppression: steroids IV BID to start tonight as well as MMF 1000 BID  -Chest tube management  & pacer wires per CTS team      GLO (acute kidney injury)  - see CKD    CKD (chronic kidney disease)  - Baseline creat appears 1.3-1.6.   - CVP 15 after albumin and blood transfusion, would recommend IV lasix dose  -will hold off on tac with Cr now 2.9          ZAC [place]  Heart Transplant  [facility]

## 2019-12-17 NOTE — PLAN OF CARE
Recommendations  1. When able to extubate, ADAT to Cardiac with texture per SLP.   2. If unable to extubate, recommend initiating trickle TF of Peptamen Intense VHP.   3. TSU RD to provide post-transplant diet education prior to discharge.   RD to monitor.    Goals: Patient to receive nutrition by RD follow-up  Nutrition Goal Status: new    Full assessment completed, see RD Note 12/17/2019.

## 2019-12-17 NOTE — NURSING
Notified Dr. Watt about the patient's Hgb of 6.5 g/dL and Hct of 20.8% (down from 7.8 g/dL & 24.9%); and notified about the patient's Cr of 2.2 mg/dL and Mg of 3.1 mg/dL.  New orders to transfuse 1 unit of PRBCs.  Will carry out new orders.  Will continue to monitor patient.

## 2019-12-17 NOTE — ASSESSMENT & PLAN NOTE
49-year-old female with NICM s/p LVAD September 2019 now s/p heart transplant 12/16/19. Arrived intubated on levo, vaso, epi, and NO. Doing well.     Neuro:   -Fentanyl for pain  -propofol for sedation while intubated  -full neuro exam     Cards:  -monitor hemodynamics  -monitor chest tube output, high initially but slowing down   - Left chest tube: 168cc   - Right chest tube: 830cc  -wean vaso, levo, and NO as tolerated  -MAP goal 60-80    Resp:  -wean to extubate today  -Minimal vent settings  -ABG as needed    Renal:  -monitor urine output  -trend BUN and creatinine  -BUN/Cr 26/2.5    Heme:  -trend H/H   - Received 1uRBC 12/17/19  -monitor chest tube output    FENGI:  -NPO for now  -replace lytes as needed  -GI ppx    Endo:  -monitor POC glucose  -insulin gtt for time being    ID:  -monitor WBC  -perioperative abx    Dispo: continue ICU care. Extubate today

## 2019-12-17 NOTE — NURSING
Notified Dr. Watt about the patient's Cr of 2 and K+ level of 5.4.  UOP 25mL for 1900 and 25mL for 2000.  CVP 15mmHg with HOB flat.  No new orders at this time.  Will continue to monitor patient.

## 2019-12-17 NOTE — PROGRESS NOTES
Dr. Dougherty informed Nitric off for about 15min, pt MAP dropped to 50, O2 sats to 94%, CVP 17. Returned to baseline after about 3minutes. Also updated on 1600 labs.

## 2019-12-17 NOTE — HOSPITAL COURSE
12/16 - Cardiac transplant, Arrived to SICU vaso, epi, and Levo. Intubated goal to extubate tomorrow  12/18: NAEON.  Will attempt extubation later this morning.

## 2019-12-17 NOTE — NURSING
Dr. Watt at the patient's bedside.  Updated on patient's status, gtts, labs, and chest tube output.  Notified Dr. Watt about the patient's UOP of 15mL for 2100 and 15mL for 2200; CVP is currrently 14 mmHg with HOB flat.  New orders to administer 250mL of 5% Albumin.  Will carry out new orders.  Will continue to monitor patient.

## 2019-12-17 NOTE — PHYSICIAN QUERY
"PT Name: Deborah Navas  MR #: [id number]    Physician Query Form - Hematology Clarification      CDS/: Sadia Leslie RN, CCDS               Contact information: [email or web address]    This form is a permanent document in the medical record.      Query Date: December 17, 2019    By submitting this query, we are merely seeking further clarification of documentation. Please utilize your independent clinical judgment when addressing the question(s) below.    The Medical record contains the following:   Indicators  Supporting Clinical Findings Location in Medical Record    "Anemia" documented     X H & H = 9.8/33.3-->6.2/21.5-->6.5/20.8 12/16--12/17 lab    BP =                     HR=      "GI bleeding" documented      Acute bleeding (Non GI site)     X Transfusion(s)  1 unit of PRBCs transfused for Hgb 6.5 g/dL.  12/17 nurse POC    Treatment:     X Other:  Orthotopic heart transplant   Removal LVAD, Removal AICD    monitor chest tube output, high initially but slowing down          - Left chest tube: 168cc          - Right chest tube: 830cc 12/16 op note      12/17 cc note     Provider, please specify diagnosis or diagnoses associated with above clinical findings.    [ X ] Acute blood loss anemia expected post-operatively       [  ] Other Hematological Diagnosis (please specify):     [  ] Clinically Undetermined       Please document in your progress notes daily for the duration of treatment, until resolved, and include in your discharge summary.                                                                                                      "

## 2019-12-17 NOTE — CONSULTS
"  [facility]  Adult Nutrition  Consult Note    SUMMARY     Recommendations  1. When able to extubate, ADAT to Cardiac with texture per SLP.   2. If unable to extubate, recommend initiating trickle TF of Peptamen Intense VHP.   3. TSU RD to provide post-transplant diet education prior to discharge.   RD to monitor.    Goals: Patient to receive nutrition by RD follow-up  Nutrition Goal Status: new  Communication of RD Recs: discussed on rounds    Reason for Assessment  Reason For Assessment: consult  Diagnosis: surgery/postoperative complications(s/p OHTx 12/16)  Relevant Medical History: NICM s/p LVAD 9/2019, HLD  Interdisciplinary Rounds: attended  General Information Comments: POD#1 s/p OHTx. Remains intubated, sedated. Patient had good PO intake prior to surgery. Noted weight loss since admission but likely fluid related (patient is -51.1L since admission). NFPE recompleted, patient with mild temporal and clavicle wasting. Patient does not meet criteria for malnutrition at this time, RD to monitor.  Nutrition Discharge Planning: TSU RD to provide post-transplant diet education prior to discharge.    Nutrition Risk Screen  Nutrition Risk Screen: no indicators present    Nutrition/Diet History  Patient Reported Diet/Restrictions/Preferences: low salt  Spiritual, Cultural Beliefs, Evangelical Practices, Values that Affect Care: no  Factors Affecting Nutritional Intake: NPO, on mechanical ventilation    Anthropometrics  Temp: 99.7 °F (37.6 °C)  Height Method: Measured  Height: 5' 6" (167.6 cm)  Height (inches): 66 in  Weight Method: Standard Scale  Weight: 100.1 kg (220 lb 10.9 oz)  Weight (lb): 220.68 lb  Ideal Body Weight (IBW), Female: 130 lb  % Ideal Body Weight, Female (lb): 169.75 lb  BMI (Calculated): 35.6  BMI Grade: 35 - 39.9 - obesity - grade II    Lab/Procedures/Meds  Pertinent Labs Reviewed: reviewed  Pertinent Labs Comments: K 5.4, BUN 28, Creat 2.5, Glu 147, Mag 3.0  Pertinent " Medications Reviewed: reviewed  Pertinent Medications Comments: methylprednisolone, pantoprazole, epinephrine, insulin drip, cardene, levophed, propofol, vasopressin    Estimated/Assessed Needs  Weight Used For Calorie Calculations: 100.1 kg (220 lb 10.9 oz)  Energy Calorie Requirements (kcal): 1401 kcal/day  Energy Need Method: Kcal/kg(14)  Protein Requirements:  g/day(1.5-2.0 g/kg)  Weight Used For Protein Calculations: 59.1 kg (130 lb 4.7 oz)(IBW)  Fluid Requirements (mL): 1 mL/kcal or per MD  Estimated Fluid Requirement Method: RDA Method  RDA Method (mL): 1401    Nutrition Prescription Ordered  Current Diet Order: NPO    Evaluation of Received Nutrient/Fluid Intake  Other Calories (kcal): 797(propofol)  I/O: +5.7L x 24hrs, -51.1L since admit  Comments: LBM 12/16  % Intake of Estimated Energy Needs: 0 - 25 %  % Meal Intake: NPO    Nutrition Risk  Level of Risk/Frequency of Follow-up: high(2x/week)     Assessment and Plan  Nutrition Problem  Increased nutrient needs    Related to (etiology):   Physiological causes related to healing    Signs and Symptoms (as evidenced by):   S/p OHTx 12/16     Interventions (treatment strategy):  Collaboration of nutrition care with other providers    Nutrition Diagnosis Status:   New    Monitor and Evaluation  Food and Nutrient Intake: energy intake, food and beverage intake  Food and Nutrient Adminstration: diet order  Knowledge/Beliefs/Attitudes: food and nutrition knowledge/skill  Physical Activity and Function: nutrition-related ADLs and IADLs  Anthropometric Measurements: weight, weight change  Biochemical Data, Medical Tests and Procedures: electrolyte and renal panel, gastrointestinal profile, inflammatory profile  Nutrition-Focused Physical Findings: overall appearance     Malnutrition Assessment  Orbital Region (Subcutaneous Fat Loss): well nourished  Upper Arm Region (Subcutaneous Fat Loss): well nourished  Thoracic and Lumbar Region: (BERNARD, restrained)   [place]  Region (Muscle Loss): mild depletion  Clavicle Bone Region (Muscle Loss): mild depletion  Clavicle and Acromion Bone Region (Muscle Loss): well nourished  Scapular Bone Region (Muscle Loss): (BERNARD)  Dorsal Hand (Muscle Loss): well nourished  Patellar Region (Muscle Loss): well nourished  Anterior Thigh Region (Muscle Loss): well nourished  Posterior Calf Region (Muscle Loss): well nourished   Edema (Fluid Accumulation): 2-->mild     Nutrition Follow-Up  RD Follow-up?: Yes

## 2019-12-17 NOTE — PROGRESS NOTES
S/P OHT earlier this morning  Intubated, sedated    Current Facility-Administered Medications:     acetaminophen tablet 650 mg, 650 mg, Per OG tube, Q4H PRN, Frankie Rush MD    acetaminophen tablet 650 mg, 650 mg, Oral, Q6H PRN, Frankie Rush MD    [START ON 12/17/2019] aspirin tablet 325 mg, 325 mg, Oral, Daily, Frankie Rush MD    EPINEPHrine (ADRENALIN) 0.06  Norepin 0.02  Vasopressin 0.04    fentaNYL injection 25 mcg, 25 mcg, Intravenous, Q3H PRN, Toby Dougherty MD    nitric oxide gas Gas 5 ppm, 5 ppm, Inhalation    propofol (DIPRIVAN) 10 mg/mL infusion, 5 mcg/kg/min (Dosing Weight),     She is sedated  CVP 11; BP 94/54 (67)  AV paced 110 (only requiring atrial pacing)  Symm BS  S1 and 2 normal without S3  Abdomen benign  Extr warm and without edema    CXR early post op looks good with mild congestion    Lab Results   Component Value Date     (H) 12/16/2019     12/16/2019    K 3.4 (L) 12/16/2019    K 3.4 (L) 12/16/2019    MG 3.1 (H) 12/16/2019     12/16/2019    CO2 21 (L) 12/16/2019    BUN 19 12/16/2019    CREATININE 1.7 (H) 12/16/2019     (H) 12/16/2019    AST 44 (H) 12/16/2019    ALT 13 12/16/2019    ALBUMIN 2.8 (L) 12/16/2019    PROT 4.6 (L) 12/16/2019    BILITOT 0.6 12/16/2019    WBC 11.65 12/16/2019    HGB 7.8 (L) 12/16/2019    HCT 24.9 (L) 12/16/2019    HCT 23 (L) 12/16/2019     (L) 12/16/2019    INR 1.3 (H) 12/16/2019     Atrial paced 110 (underlying HR 84)  NEP 0.02, Epin 0.06, vaso 0.04  sat venous 59 tonight with art sat 100  Using assumed oxygen consumption of 350 (used wt 100 kg which was approx lowest wt this hosp stay), hgb 7.8   STEPHANIE CO 8.0 L/min  CVP 11; BP 94/54 (67) calc     Her labs are all trending the right direction and she looks good though urine output low last hours--would recommend volume for urine at this time over diuretic and use only atrial pacing since AV conduction is normal; though underlying HR suggests that she might  benefit from additional beta support her Fitz CO is excellent so would not push further beta support at this time    I have ordered methylprednisolone 125 mg IV q 8 hrs x 3 doses starting tonight and cellcept 1000 mg IV q 12 hrs starting tonight.      Earlier today I had thought that with pre-op Cr she would benefit from thymo and renal sparing my concern is rapidly decreasing as renal function is improving nicely.   We will decide tomorrow morning on whether this will be necessary but I am hopeful thymo can be avoided.    Dr. Bryant on call and will be staff covering tomorrow in my absence.    Critical Care Time: 30 minutes     Critical care was time spent personally by me on the following activities: development of treatment plan with patient or surrogate and bedside caregivers, discussions with consultants, evaluation of patient's response to treatment, examination of patient, ordering and performing treatments and interventions, ordering and review of laboratory studies, ordering and review of radiographic studies, pulse oximetry, re-evaluation of patient's condition. This critical care time did not overlap with that of any other provider or involve time for any procedures.

## 2019-12-17 NOTE — SUBJECTIVE & OBJECTIVE
Interval History/Significant Events:   MAPS over 65  Now at Epi 0.06 + Levo 0.01 + Vaso 0.04  One unit of blood transfused overnight.   Low UOP increase creatinine to 2.5 (Normal range 1.8-2.5)  Minimal vent settings, NO.     Follow-up For: Procedure(s) (LRB):  TRANSPLANT, HEART (N/A)    Post-Operative Day: 1 Day Post-Op    Objective:     Vital Signs (Most Recent):  Temp: 99.7 °F (37.6 °C) (12/17/19 0700)  Pulse: 110 (12/17/19 0800)  Resp: (!) 22 (12/17/19 0800)  BP: (!) 89/53 (12/17/19 0800)  SpO2: 99 % (12/17/19 0800) Vital Signs (24h Range):  Temp:  [97.6 °F (36.4 °C)-99.7 °F (37.6 °C)] 99.7 °F (37.6 °C)  Pulse:  [] 110  Resp:  [22-28] 22  SpO2:  [98 %-100 %] 99 %  BP: ()/(51-67) 89/53  Arterial Line BP: ()/() 87/50     Weight: 100.1 kg (220 lb 10.9 oz)  Body mass index is 35.62 kg/m².      Intake/Output Summary (Last 24 hours) at 12/17/2019 0817  Last data filed at 12/17/2019 0800  Gross per 24 hour   Intake 7973 ml   Output 2091 ml   Net 5882 ml       Physical Exam   Constitutional: She appears well-developed and well-nourished. No distress.   HENT:   Head: Normocephalic and atraumatic.   Eyes: Conjunctivae are normal.   Neck: Neck supple.   Cardiovascular:   Atrial paced at 110.    Pulmonary/Chest: Effort normal.   Chest tubes intact. Serosanguinous drainage. Pacing wires in place.    Abdominal: Soft. There is no tenderness.   Musculoskeletal: She exhibits no deformity.   Right femoral access site covered with dressing. No issues.    Neurological: She is alert.   Skin: Skin is warm and dry.   Psychiatric:   Unable to assess.    Nursing note and vitals reviewed.       Vents:  Vent Mode: A/C (12/17/19 0730)  Set Rate: 20 bmp (12/17/19 0730)  Vt Set: 400 mL (12/17/19 0730)  PEEP/CPAP: 5 cmH20 (12/17/19 0730)  Oxygen Concentration (%): 40 (12/17/19 0800)  Peak Airway Pressure: 27 cmH2O (12/17/19 0730)  Plateau Pressure: 0 cmH20 (12/17/19 0730)  Total Ve: 9.43 mL (12/17/19 0730)  F/VT  Ratio<105 (RSBI): (!) 54.86 (12/17/19 0730)    Lines/Drains/Airways     Central Venous Catheter Line                 Introducer 12/16/19 0325 left internal jugular 1 day         Percutaneous Central Line Insertion/Assessment - double lumen  12/16/19 0325 1 day         Percutaneous Central Line Insertion/Assessment - triple lumen  12/16/19 0330 left internal jugular 1 day          Drain                 Urethral Catheter 12/16/19 0332 Temperature probe 14 Fr. 1 day         Y Chest Tube 1 and 2 12/16/19 1027 1 Right Mediastinal 19 Fr. 2 Left Mediastinal 19 Fr. less than 1 day         Y Chest Tube 3 and 4 12/16/19 1030 3 Right Pleural 19 Fr. 4 Left Pleural 19 Fr. less than 1 day          Airway                 Airway - Non-Surgical 12/16/19 0327 Endotracheal Tube 1 day          Arterial Line                 Arterial Line 12/16/19 0300 Left Other (Comment) 1 day          Line                 Pacer Wires 12/16/19 1004 less than 1 day          Peripheral Intravenous Line                 Midline Catheter Insertion/Assessment  - Single Lumen 12/04/19 1552 Right cephalic vein (lateral side of arm) 18g x 8cm 12 days                Significant Labs:    CBC/Anemia Profile:  Recent Labs   Lab 12/16/19  1613 12/17/19  0102 12/17/19  0459   WBC 11.65 13.50* 16.95*   HGB 7.8* 6.5* 7.4*   HCT 24.9* 20.8* 23.6*   * 136* 143*   MCV 86 85 87   RDW 16.8* 17.0* 16.9*        Chemistries:  Recent Labs   Lab 12/16/19  0158 12/16/19  1015 12/16/19  1234 12/16/19  1830 12/17/19  0102 12/17/19  0459    141 140 139 138 137   K 4.2 3.2* 3.4*  3.4* 5.4* 4.4 5.4*   CL 97 101 101 103 102 101   CO2 32* 20* 21* 24 25 24   BUN 22* 20 19 22* 26* 28*   CREATININE 2.0* 1.8* 1.7* 2.0* 2.2* 2.5*   CALCIUM 9.2 8.6* 8.7 9.1 9.3 9.1   ALBUMIN 3.5 2.8*  --  3.5  --   --    PROT 7.0 4.6*  --  5.4*  --   --    BILITOT 0.5 0.6  --  0.8  --   --    ALKPHOS 87 44*  --  43*  --   --    ALT 14 13  --  20  --   --    AST 18 44*  --  68*  --   --    MG 2.4   --  3.1* 2.9* 3.1* 3.0*   PHOS 4.1  --  2.2*  --  3.2 4.5       All pertinent labs within the past 24 hours have been reviewed.    Significant Imaging:  I have reviewed and interpreted all pertinent imaging results/findings within the past 24 hours.

## 2019-12-17 NOTE — ASSESSMENT & PLAN NOTE
-Transplant Date 12/16/2019  -[facility]  -Transplanted by: Dr. Nuñez  -CMV Status:D-/R+   -Rejection status: Low Risk  -Hemodynamic support with:  and levophed, vaso off  -will hold off on tac with renal function and consider thymo  -Current immunosuppression: steroids IV BID to start tonight as well as MMF 1000 BID  -Chest tube management  & pacer wires per CTS team

## 2019-12-17 NOTE — SUBJECTIVE & OBJECTIVE
"Interval HPI:   Overnight events:   Patient remains intubated in SICU. BG is within goal on current intensive IV insulin infusion.   No diet orders on file    Eating:   NPO  Nausea: No  Hypoglycemia and intervention: No  Fever: No  TPN and/or TF: No  If yes, type of TF/TPN and rate: None    /71   Pulse (!) 111   Temp 98.4 °F (36.9 °C) (Oral)   Resp (!) 21   Ht 5' 6" (1.676 m)   Wt 100.1 kg (220 lb 10.9 oz)   LMP  (LMP Unknown)   SpO2 100%   Breastfeeding? No   BMI 35.62 kg/m²     Labs Reviewed and Include    Recent Labs   Lab 12/17/19  1150   *   CALCIUM 9.3   ALBUMIN 3.6   PROT 5.5*      K 5.3*   CO2 22*      BUN 34*   CREATININE 2.9*   ALKPHOS 43*   ALT 79*   *   BILITOT 0.5     Lab Results   Component Value Date    WBC 21.27 (H) 12/17/2019    HGB 7.3 (L) 12/17/2019    HCT 23.0 (L) 12/17/2019    MCV 86 12/17/2019     12/17/2019     No results for input(s): TSH, FREET4 in the last 168 hours.  Lab Results   Component Value Date    HGBA1C 6.5 (H) 09/06/2019       Nutritional status:   Body mass index is 35.62 kg/m².  Lab Results   Component Value Date    ALBUMIN 3.6 12/17/2019    ALBUMIN 3.8 12/17/2019    ALBUMIN 3.5 12/16/2019     Lab Results   Component Value Date    PREALBUMIN 27 12/16/2019    PREALBUMIN 28 12/13/2019    PREALBUMIN 29 12/11/2019       Estimated Creatinine Clearance: 27.7 mL/min (A) (based on SCr of 2.9 mg/dL (H)).    Accu-Checks  Recent Labs     12/17/19  0803 12/17/19  0859 12/17/19  1005 12/17/19  1036 12/17/19  1059 12/17/19  1125 12/17/19  1149 12/17/19  1309 12/17/19  1409 12/17/19  1500   POCTGLUCOSE 179* 176* 187* 287* 231* 209* 203* 165* 183* 166*       Current Medications and/or Treatments Impacting Glycemic Control  Immunotherapy:    Immunosuppressants         Stop Route Frequency     mycophenolate (CELLCEPT) 1,000 mg in dextrose 5 % 100 mL IVPB      -- IV Every 12 hours        Steroids:   Hormones (From admission, onward)    Start     Stop " Route Frequency Ordered    12/17/19 2000  methylPREDNISolone sodium succinate injection 40 mg      -- IV Every 12 hours 12/17/19 0917    12/16/19 1315  vasopressin (PITRESSIN) 0.2 Units/mL in dextrose 5 % 100 mL infusion      -- IV Continuous 12/16/19 1214        Pressors:    Autonomic Drugs (From admission, onward)    Start     Stop Route Frequency Ordered    12/16/19 1315  EPINEPHrine (ADRENALIN) 5 mg in sodium chloride 0.9% 250 mL infusion     Question Answer Comment   Titrate by: (in mcg/kg/min) 0.01    Titrate interval: (in minutes) 5    Titrate to maintain: (SBP or MAP or Cardiac Index) SBP    Maximum dose: (in mcg/kg/min) 2        -- IV Continuous 12/16/19 1214 12/16/19 1315  norepinephrine 4 mg in dextrose 5% 250 mL infusion (premix) (titrating)     Question Answer Comment   Titrate by: (in mcg/kg/min) 0.05    Titrate interval: (in minutes) 5    Titrate to maintain: (MAP or SBP) MAP    Greater than: (in mmHg) 65    Maximum dose: (in mcg/kg/min) 3        -- IV Continuous 12/16/19 1214        Hyperglycemia/Diabetes Medications:   Antihyperglycemics (From admission, onward)    Start     Stop Route Frequency Ordered    12/16/19 1315  insulin regular (Humulin R) 100 Units in sodium chloride 0.9% 100 mL infusion     Question:  Insulin rate changes (DO NOT MODIFY ANSWER)  Answer:  [email or web address]    -- IV Continuous 12/16/19 1214

## 2019-12-17 NOTE — ASSESSMENT & PLAN NOTE
BG goal 140 - 180     Continue IV insulin infusion protocol  Requires intensive BG monitoring while on protocol     ** Please call Endocrine for any BG related issues **  ** Please notify Endocrine for any change and/or advance in diet**      Discharge planning: JONAS

## 2019-12-18 LAB
ALBUMIN SERPL BCP-MCNC: 3.1 G/DL (ref 3.5–5.2)
ALBUMIN SERPL BCP-MCNC: 3.2 G/DL (ref 3.5–5.2)
ALBUMIN SERPL BCP-MCNC: 3.3 G/DL (ref 3.5–5.2)
ALBUMIN SERPL BCP-MCNC: 3.4 G/DL (ref 3.5–5.2)
ALBUMIN SERPL BCP-MCNC: 3.5 G/DL (ref 3.5–5.2)
ALBUMIN SERPL BCP-MCNC: 3.5 G/DL (ref 3.5–5.2)
ALLENS TEST: ABNORMAL
ALLENS TEST: NORMAL
ALP SERPL-CCNC: 109 U/L (ref 55–135)
ALP SERPL-CCNC: 48 U/L (ref 55–135)
ALP SERPL-CCNC: 49 U/L (ref 55–135)
ALP SERPL-CCNC: 49 U/L (ref 55–135)
ALP SERPL-CCNC: 50 U/L (ref 55–135)
ALP SERPL-CCNC: 80 U/L (ref 55–135)
ALT SERPL W/O P-5'-P-CCNC: 105 U/L (ref 10–44)
ALT SERPL W/O P-5'-P-CCNC: 39 U/L (ref 10–44)
ALT SERPL W/O P-5'-P-CCNC: 49 U/L (ref 10–44)
ALT SERPL W/O P-5'-P-CCNC: 59 U/L (ref 10–44)
ALT SERPL W/O P-5'-P-CCNC: 74 U/L (ref 10–44)
ALT SERPL W/O P-5'-P-CCNC: 94 U/L (ref 10–44)
ANION GAP SERPL CALC-SCNC: 10 MMOL/L (ref 8–16)
ANION GAP SERPL CALC-SCNC: 11 MMOL/L (ref 8–16)
ANION GAP SERPL CALC-SCNC: 12 MMOL/L (ref 8–16)
ANION GAP SERPL CALC-SCNC: 13 MMOL/L (ref 8–16)
ANION GAP SERPL CALC-SCNC: 13 MMOL/L (ref 8–16)
ANION GAP SERPL CALC-SCNC: 9 MMOL/L (ref 8–16)
ANISOCYTOSIS BLD QL SMEAR: SLIGHT
AST SERPL-CCNC: 153 U/L (ref 10–40)
AST SERPL-CCNC: 169 U/L (ref 10–40)
AST SERPL-CCNC: 197 U/L (ref 10–40)
AST SERPL-CCNC: 239 U/L (ref 10–40)
AST SERPL-CCNC: 288 U/L (ref 10–40)
AST SERPL-CCNC: 305 U/L (ref 10–40)
AV INDEX (PROSTH): 0.86
AV MEAN GRADIENT: 10 MMHG
AV PEAK GRADIENT: 14 MMHG
AV VALVE AREA: 2.93 CM2
AV VELOCITY RATIO: 0.77
B CELL RESULTS - XM ALLO: NEGATIVE
B CELL RESULTS - XM ALLO: NEGATIVE
B CELL RESULTS - XM AUTO: NEGATIVE
B MCS AVERAGE - XM ALLO: 51.2
B MCS AVERAGE - XM ALLO: 93.7
B MCS AVERAGE - XM AUTO: -2.7
BASO STIPL BLD QL SMEAR: ABNORMAL
BASOPHILS # BLD AUTO: 0 K/UL (ref 0–0.2)
BASOPHILS # BLD AUTO: 0.01 K/UL (ref 0–0.2)
BASOPHILS # BLD AUTO: 0.03 K/UL (ref 0–0.2)
BASOPHILS # BLD AUTO: ABNORMAL K/UL (ref 0–0.2)
BASOPHILS NFR BLD: 0 % (ref 0–1.9)
BASOPHILS NFR BLD: 0.1 % (ref 0–1.9)
BILIRUB SERPL-MCNC: 0.4 MG/DL (ref 0.1–1)
BILIRUB SERPL-MCNC: 0.5 MG/DL (ref 0.1–1)
BILIRUB SERPL-MCNC: 0.6 MG/DL (ref 0.1–1)
BILIRUB SERPL-MCNC: 0.8 MG/DL (ref 0.1–1)
BLD PROD TYP BPU: NORMAL
BLOOD UNIT EXPIRATION DATE: NORMAL
BLOOD UNIT TYPE CODE: 5100
BLOOD UNIT TYPE: NORMAL
BSA FOR ECHO PROCEDURE: 2.16 M2
BUN SERPL-MCNC: 43 MG/DL (ref 6–20)
BUN SERPL-MCNC: 46 MG/DL (ref 6–20)
BUN SERPL-MCNC: 52 MG/DL (ref 6–20)
BUN SERPL-MCNC: 56 MG/DL (ref 6–20)
BUN SERPL-MCNC: 60 MG/DL (ref 6–20)
BUN SERPL-MCNC: 64 MG/DL (ref 6–20)
BURR CELLS BLD QL SMEAR: ABNORMAL
BURR CELLS BLD QL SMEAR: ABNORMAL
CALCIUM SERPL-MCNC: 8.4 MG/DL (ref 8.7–10.5)
CALCIUM SERPL-MCNC: 8.5 MG/DL (ref 8.7–10.5)
CALCIUM SERPL-MCNC: 8.6 MG/DL (ref 8.7–10.5)
CALCIUM SERPL-MCNC: 9.1 MG/DL (ref 8.7–10.5)
CALCIUM SERPL-MCNC: 9.1 MG/DL (ref 8.7–10.5)
CALCIUM SERPL-MCNC: 9.2 MG/DL (ref 8.7–10.5)
CHLORIDE SERPL-SCNC: 100 MMOL/L (ref 95–110)
CHLORIDE SERPL-SCNC: 100 MMOL/L (ref 95–110)
CHLORIDE SERPL-SCNC: 101 MMOL/L (ref 95–110)
CHLORIDE SERPL-SCNC: 101 MMOL/L (ref 95–110)
CHLORIDE SERPL-SCNC: 102 MMOL/L (ref 95–110)
CHLORIDE SERPL-SCNC: 102 MMOL/L (ref 95–110)
CLASS I ANTIBODY COMMENTS - LUMINEX: NORMAL
CLASS II ANTIBODIES - LUMINEX: NEGATIVE
CO2 SERPL-SCNC: 21 MMOL/L (ref 23–29)
CO2 SERPL-SCNC: 21 MMOL/L (ref 23–29)
CO2 SERPL-SCNC: 22 MMOL/L (ref 23–29)
CO2 SERPL-SCNC: 23 MMOL/L (ref 23–29)
CO2 SERPL-SCNC: 23 MMOL/L (ref 23–29)
CO2 SERPL-SCNC: 24 MMOL/L (ref 23–29)
CODING SYSTEM: NORMAL
CPRA %: 0
CREAT SERPL-MCNC: 3.2 MG/DL (ref 0.5–1.4)
CREAT SERPL-MCNC: 3.3 MG/DL (ref 0.5–1.4)
CREAT SERPL-MCNC: 3.4 MG/DL (ref 0.5–1.4)
CREAT SERPL-MCNC: 3.5 MG/DL (ref 0.5–1.4)
CV ECHO LV RWT: 0.45 CM
DACRYOCYTES BLD QL SMEAR: ABNORMAL
DELSYS: ABNORMAL
DELSYS: NORMAL
DIFFERENTIAL METHOD: ABNORMAL
DISPENSE STATUS: NORMAL
DONOR MRN: NORMAL
DONOR MRN: NORMAL
DOP CALC AO PEAK VEL: 1.84 M/S
DOP CALC AO VTI: 24.77 CM
DOP CALC LVOT AREA: 3.4 CM2
DOP CALC LVOT DIAMETER: 2.09 CM
DOP CALC LVOT PEAK VEL: 1.41 M/S
DOP CALC LVOT STROKE VOLUME: 72.69 CM3
DOP CALCLVOT PEAK VEL VTI: 21.2 CM
ECHO LV POSTERIOR WALL: 0.84 CM (ref 0.6–1.1)
EOSINOPHIL # BLD AUTO: 0 K/UL (ref 0–0.5)
EOSINOPHIL # BLD AUTO: ABNORMAL K/UL (ref 0–0.5)
EOSINOPHIL NFR BLD: 0 % (ref 0–8)
ERYTHROCYTE [DISTWIDTH] IN BLOOD BY AUTOMATED COUNT: 16.9 % (ref 11.5–14.5)
ERYTHROCYTE [DISTWIDTH] IN BLOOD BY AUTOMATED COUNT: 17 % (ref 11.5–14.5)
ERYTHROCYTE [DISTWIDTH] IN BLOOD BY AUTOMATED COUNT: 17 % (ref 11.5–14.5)
ERYTHROCYTE [DISTWIDTH] IN BLOOD BY AUTOMATED COUNT: 17.1 % (ref 11.5–14.5)
ERYTHROCYTE [DISTWIDTH] IN BLOOD BY AUTOMATED COUNT: 17.2 % (ref 11.5–14.5)
ERYTHROCYTE [DISTWIDTH] IN BLOOD BY AUTOMATED COUNT: 17.2 % (ref 11.5–14.5)
ERYTHROCYTE [SEDIMENTATION RATE] IN BLOOD BY WESTERGREN METHOD: 20 MM/H
EST. GFR  (AFRICAN AMERICAN): 16.7 ML/MIN/1.73 M^2
EST. GFR  (AFRICAN AMERICAN): 17.3 ML/MIN/1.73 M^2
EST. GFR  (AFRICAN AMERICAN): 17.9 ML/MIN/1.73 M^2
EST. GFR  (AFRICAN AMERICAN): 18.6 ML/MIN/1.73 M^2
EST. GFR  (NON AFRICAN AMERICAN): 14.5 ML/MIN/1.73 M^2
EST. GFR  (NON AFRICAN AMERICAN): 15 ML/MIN/1.73 M^2
EST. GFR  (NON AFRICAN AMERICAN): 15.5 ML/MIN/1.73 M^2
EST. GFR  (NON AFRICAN AMERICAN): 16.1 ML/MIN/1.73 M^2
FIO2: 40
FLOW: 3
FLOW: 3
FRACTIONAL SHORTENING: 37 % (ref 28–44)
FXMAL TESTING DATE: NORMAL
FXMAL TESTING DATE: NORMAL
FXMAU TESTING DATE: NORMAL
GLUCOSE SERPL-MCNC: 133 MG/DL (ref 70–110)
GLUCOSE SERPL-MCNC: 136 MG/DL (ref 70–110)
GLUCOSE SERPL-MCNC: 141 MG/DL (ref 70–110)
GLUCOSE SERPL-MCNC: 142 MG/DL (ref 70–110)
GLUCOSE SERPL-MCNC: 146 MG/DL (ref 70–110)
GLUCOSE SERPL-MCNC: 163 MG/DL (ref 70–110)
HBV DNA SERPL NAA+PROBE-ACNC: <10 IU/ML
HBV DNA SERPL NAA+PROBE-LOG IU: <1 LOG (10) IU/ML
HBV DNA SERPL QL NAA+PROBE: NOT DETECTED
HCO3 UR-SCNC: 21.4 MMOL/L (ref 24–28)
HCO3 UR-SCNC: 23.1 MMOL/L (ref 24–28)
HCO3 UR-SCNC: 23.2 MMOL/L (ref 24–28)
HCO3 UR-SCNC: 24.3 MMOL/L (ref 24–28)
HCT VFR BLD AUTO: 21.8 % (ref 37–48.5)
HCT VFR BLD AUTO: 23.3 % (ref 37–48.5)
HCT VFR BLD AUTO: 23.9 % (ref 37–48.5)
HCT VFR BLD AUTO: 24 % (ref 37–48.5)
HCT VFR BLD AUTO: 24.2 % (ref 37–48.5)
HCT VFR BLD AUTO: 24.7 % (ref 37–48.5)
HCT VFR BLD CALC: 24 %PCV (ref 36–54)
HCV RNA SERPL NAA+PROBE-LOG IU: <1.08 LOG (10) IU/ML
HCV RNA SERPL QL NAA+PROBE: NOT DETECTED IU/ML
HCV RNA SPEC NAA+PROBE-ACNC: <12 IU/ML
HGB BLD-MCNC: 6.9 G/DL (ref 12–16)
HGB BLD-MCNC: 7.4 G/DL (ref 12–16)
HGB BLD-MCNC: 7.5 G/DL (ref 12–16)
HGB BLD-MCNC: 7.5 G/DL (ref 12–16)
HGB BLD-MCNC: 7.6 G/DL (ref 12–16)
HGB BLD-MCNC: 7.7 G/DL (ref 12–16)
HIV UQ DATE RECEIVED: NORMAL
HIV UQ DATE REPORTED: NORMAL
HIV1 RNA # SERPL NAA+PROBE: <40 COPIES/ML
HIV1 RNA SERPL NAA+PROBE-LOG#: <1.6 LOG (10) COPIES/ML
HIV1 RNA SERPL QL NAA+PROBE: NOT DETECTED
HLA FLOW CROSSMATCH (ALLO) INTERPRETATION: NORMAL
HLA FLOW CROSSMATCH (AUTO) INTERPRETATION: NORMAL
HPRA INTERPRETATION: NORMAL
HYPOCHROMIA BLD QL SMEAR: ABNORMAL
IMM GRANULOCYTES # BLD AUTO: 0.03 K/UL (ref 0–0.04)
IMM GRANULOCYTES # BLD AUTO: 0.07 K/UL (ref 0–0.04)
IMM GRANULOCYTES # BLD AUTO: 0.08 K/UL (ref 0–0.04)
IMM GRANULOCYTES # BLD AUTO: 0.19 K/UL (ref 0–0.04)
IMM GRANULOCYTES # BLD AUTO: 0.24 K/UL (ref 0–0.04)
IMM GRANULOCYTES # BLD AUTO: ABNORMAL K/UL (ref 0–0.04)
IMM GRANULOCYTES NFR BLD AUTO: 0.6 % (ref 0–0.5)
IMM GRANULOCYTES NFR BLD AUTO: 0.7 % (ref 0–0.5)
IMM GRANULOCYTES NFR BLD AUTO: 0.9 % (ref 0–0.5)
IMM GRANULOCYTES NFR BLD AUTO: 0.9 % (ref 0–0.5)
IMM GRANULOCYTES NFR BLD AUTO: 1.1 % (ref 0–0.5)
IMM GRANULOCYTES NFR BLD AUTO: ABNORMAL % (ref 0–0.5)
INR PPP: 1.4 (ref 0.8–1.2)
INTERVENTRICULAR SEPTUM: 0.96 CM (ref 0.6–1.1)
LDH SERPL L TO P-CCNC: 0.58 MMOL/L (ref 0.36–1.25)
LEFT INTERNAL DIMENSION IN SYSTOLE: 2.35 CM (ref 2.1–4)
LEFT VENTRICLE DIASTOLIC VOLUME INDEX: 28.18 ML/M2
LEFT VENTRICLE DIASTOLIC VOLUME: 58.69 ML
LEFT VENTRICLE MASS INDEX: 47 G/M2
LEFT VENTRICLE SYSTOLIC VOLUME INDEX: 9.2 ML/M2
LEFT VENTRICLE SYSTOLIC VOLUME: 19.08 ML
LEFT VENTRICULAR INTERNAL DIMENSION IN DIASTOLE: 3.72 CM (ref 3.5–6)
LEFT VENTRICULAR MASS: 97.71 G
LYMPHOCYTES # BLD AUTO: 0.1 K/UL (ref 1–4.8)
LYMPHOCYTES # BLD AUTO: 0.6 K/UL (ref 1–4.8)
LYMPHOCYTES # BLD AUTO: 0.6 K/UL (ref 1–4.8)
LYMPHOCYTES # BLD AUTO: ABNORMAL K/UL (ref 1–4.8)
LYMPHOCYTES NFR BLD: 0.8 % (ref 18–48)
LYMPHOCYTES NFR BLD: 1.5 % (ref 18–48)
LYMPHOCYTES NFR BLD: 1.8 % (ref 18–48)
LYMPHOCYTES NFR BLD: 2.8 % (ref 18–48)
LYMPHOCYTES NFR BLD: 2.9 % (ref 18–48)
LYMPHOCYTES NFR BLD: 3 % (ref 18–48)
MAGNESIUM SERPL-MCNC: 2.8 MG/DL (ref 1.6–2.6)
MAGNESIUM SERPL-MCNC: 2.8 MG/DL (ref 1.6–2.6)
MAGNESIUM SERPL-MCNC: 2.9 MG/DL (ref 1.6–2.6)
MAGNESIUM SERPL-MCNC: 3 MG/DL (ref 1.6–2.6)
MCH RBC QN AUTO: 27 PG (ref 27–31)
MCH RBC QN AUTO: 27.2 PG (ref 27–31)
MCH RBC QN AUTO: 27.4 PG (ref 27–31)
MCH RBC QN AUTO: 27.5 PG (ref 27–31)
MCH RBC QN AUTO: 27.9 PG (ref 27–31)
MCH RBC QN AUTO: 27.9 PG (ref 27–31)
MCHC RBC AUTO-ENTMCNC: 31 G/DL (ref 32–36)
MCHC RBC AUTO-ENTMCNC: 31.2 G/DL (ref 32–36)
MCHC RBC AUTO-ENTMCNC: 31.3 G/DL (ref 32–36)
MCHC RBC AUTO-ENTMCNC: 31.4 G/DL (ref 32–36)
MCHC RBC AUTO-ENTMCNC: 31.7 G/DL (ref 32–36)
MCHC RBC AUTO-ENTMCNC: 32.2 G/DL (ref 32–36)
MCV RBC AUTO: 87 FL (ref 82–98)
MCV RBC AUTO: 88 FL (ref 82–98)
MCV RBC AUTO: 89 FL (ref 82–98)
MODE: ABNORMAL
MODE: NORMAL
MONOCYTES # BLD AUTO: 0 K/UL (ref 0.3–1)
MONOCYTES # BLD AUTO: 0.1 K/UL (ref 0.3–1)
MONOCYTES # BLD AUTO: 0.3 K/UL (ref 0.3–1)
MONOCYTES # BLD AUTO: 0.9 K/UL (ref 0.3–1)
MONOCYTES # BLD AUTO: 1 K/UL (ref 0.3–1)
MONOCYTES # BLD AUTO: ABNORMAL K/UL (ref 0.3–1)
MONOCYTES NFR BLD: 0.4 % (ref 4–15)
MONOCYTES NFR BLD: 1.3 % (ref 4–15)
MONOCYTES NFR BLD: 2.3 % (ref 4–15)
MONOCYTES NFR BLD: 2.5 % (ref 4–15)
MONOCYTES NFR BLD: 4 % (ref 4–15)
MONOCYTES NFR BLD: 4.4 % (ref 4–15)
MV PEAK E VEL: 1.19 M/S
NEUTROPHILS # BLD AUTO: 11.1 K/UL (ref 1.8–7.7)
NEUTROPHILS # BLD AUTO: 19.8 K/UL (ref 1.8–7.7)
NEUTROPHILS # BLD AUTO: 20.1 K/UL (ref 1.8–7.7)
NEUTROPHILS # BLD AUTO: 4.6 K/UL (ref 1.8–7.7)
NEUTROPHILS # BLD AUTO: 7.4 K/UL (ref 1.8–7.7)
NEUTROPHILS NFR BLD: 91.6 % (ref 38–73)
NEUTROPHILS NFR BLD: 92.2 % (ref 38–73)
NEUTROPHILS NFR BLD: 93 % (ref 38–73)
NEUTROPHILS NFR BLD: 96.1 % (ref 38–73)
NEUTROPHILS NFR BLD: 96.6 % (ref 38–73)
NEUTROPHILS NFR BLD: 96.8 % (ref 38–73)
NEUTS BAND NFR BLD MANUAL: 1.5 %
NRBC BLD-RTO: 0 /100 WBC
NRBC BLD-RTO: 1 /100 WBC
NRBC BLD-RTO: 2 /100 WBC
NRBC BLD-RTO: 2 /100 WBC
NUM UNITS TRANS PACKED RBC: NORMAL
OVALOCYTES BLD QL SMEAR: ABNORMAL
PCO2 BLDA: 36.1 MMHG (ref 35–45)
PCO2 BLDA: 36.7 MMHG (ref 35–45)
PCO2 BLDA: 37.3 MMHG (ref 35–45)
PCO2 BLDA: 41 MMHG (ref 35–45)
PEEP: 5
PH SMN: 7.37 [PH] (ref 7.35–7.45)
PH SMN: 7.38 [PH] (ref 7.35–7.45)
PH SMN: 7.41 [PH] (ref 7.35–7.45)
PH SMN: 7.42 [PH] (ref 7.35–7.45)
PISA TR MAX VEL: 2.77 M/S
PLATELET # BLD AUTO: 105 K/UL (ref 150–350)
PLATELET # BLD AUTO: 133 K/UL (ref 150–350)
PLATELET # BLD AUTO: 173 K/UL (ref 150–350)
PLATELET # BLD AUTO: 174 K/UL (ref 150–350)
PLATELET # BLD AUTO: 180 K/UL (ref 150–350)
PLATELET # BLD AUTO: 94 K/UL (ref 150–350)
PLATELET BLD QL SMEAR: ABNORMAL
PMV BLD AUTO: 10.3 FL (ref 9.2–12.9)
PMV BLD AUTO: 10.4 FL (ref 9.2–12.9)
PMV BLD AUTO: 10.4 FL (ref 9.2–12.9)
PMV BLD AUTO: 10.6 FL (ref 9.2–12.9)
PMV BLD AUTO: 10.8 FL (ref 9.2–12.9)
PMV BLD AUTO: 9.8 FL (ref 9.2–12.9)
PO2 BLDA: 32 MMHG (ref 40–60)
PO2 BLDA: 33 MMHG (ref 40–60)
PO2 BLDA: 39 MMHG (ref 40–60)
PO2 BLDA: 98 MMHG (ref 80–100)
POC BE: -1 MMOL/L
POC BE: -1 MMOL/L
POC BE: -2 MMOL/L
POC BE: -4 MMOL/L
POC IONIZED CALCIUM: 1.18 MMOL/L (ref 1.06–1.42)
POC SATURATED O2: 60 % (ref 95–100)
POC SATURATED O2: 65 % (ref 95–100)
POC SATURATED O2: 73 % (ref 95–100)
POC SATURATED O2: 98 % (ref 95–100)
POC TCO2: 23 MMOL/L (ref 24–29)
POC TCO2: 24 MMOL/L (ref 23–27)
POC TCO2: 24 MMOL/L (ref 24–29)
POC TCO2: 26 MMOL/L (ref 24–29)
POCT GLUCOSE: 128 MG/DL (ref 70–110)
POCT GLUCOSE: 129 MG/DL (ref 70–110)
POCT GLUCOSE: 132 MG/DL (ref 70–110)
POCT GLUCOSE: 134 MG/DL (ref 70–110)
POCT GLUCOSE: 136 MG/DL (ref 70–110)
POCT GLUCOSE: 141 MG/DL (ref 70–110)
POCT GLUCOSE: 141 MG/DL (ref 70–110)
POCT GLUCOSE: 143 MG/DL (ref 70–110)
POCT GLUCOSE: 146 MG/DL (ref 70–110)
POCT GLUCOSE: 167 MG/DL (ref 70–110)
POCT GLUCOSE: 187 MG/DL (ref 70–110)
POIKILOCYTOSIS BLD QL SMEAR: SLIGHT
POLYCHROMASIA BLD QL SMEAR: ABNORMAL
POTASSIUM BLD-SCNC: 5.3 MMOL/L (ref 3.5–5.1)
POTASSIUM SERPL-SCNC: 3.9 MMOL/L (ref 3.5–5.1)
POTASSIUM SERPL-SCNC: 4 MMOL/L (ref 3.5–5.1)
POTASSIUM SERPL-SCNC: 4.6 MMOL/L (ref 3.5–5.1)
POTASSIUM SERPL-SCNC: 5.4 MMOL/L (ref 3.5–5.1)
POTASSIUM SERPL-SCNC: 5.5 MMOL/L (ref 3.5–5.1)
POTASSIUM SERPL-SCNC: 5.6 MMOL/L (ref 3.5–5.1)
PROT SERPL-MCNC: 5.3 G/DL (ref 6–8.4)
PROT SERPL-MCNC: 5.4 G/DL (ref 6–8.4)
PROT SERPL-MCNC: 5.5 G/DL (ref 6–8.4)
PROT SERPL-MCNC: 5.6 G/DL (ref 6–8.4)
PROT SERPL-MCNC: 5.6 G/DL (ref 6–8.4)
PROT SERPL-MCNC: 5.7 G/DL (ref 6–8.4)
PROTHROMBIN TIME: 13.5 SEC (ref 9–12.5)
RBC # BLD AUTO: 2.52 M/UL (ref 4–5.4)
RBC # BLD AUTO: 2.69 M/UL (ref 4–5.4)
RBC # BLD AUTO: 2.72 M/UL (ref 4–5.4)
RBC # BLD AUTO: 2.74 M/UL (ref 4–5.4)
RBC # BLD AUTO: 2.76 M/UL (ref 4–5.4)
RBC # BLD AUTO: 2.8 M/UL (ref 4–5.4)
SAMPLE: ABNORMAL
SAMPLE: NORMAL
SERUM COLLECTION DT - LUMINEX CLASS I: NORMAL
SERUM COLLECTION DT - LUMINEX CLASS II: NORMAL
SERUM COLLECTION DT - XM ALLO: NORMAL
SERUM COLLECTION DT - XM ALLO: NORMAL
SERUM COLLECTION DT - XM AUTO: NORMAL
SINUS: 2.72 CM
SITE: ABNORMAL
SITE: NORMAL
SODIUM BLD-SCNC: 135 MMOL/L (ref 136–145)
SODIUM SERPL-SCNC: 134 MMOL/L (ref 136–145)
SODIUM SERPL-SCNC: 135 MMOL/L (ref 136–145)
SODIUM SERPL-SCNC: 135 MMOL/L (ref 136–145)
SODIUM SERPL-SCNC: 136 MMOL/L (ref 136–145)
SP02: 95
SP02: 97
SP02: 99
SP02: 99
SPCL1 TESTING DATE: NORMAL
SPCL2 TESTING DATE: NORMAL
SPLUA TESTING DATE: NORMAL
T CELL RESULTS - XM ALLO: NEGATIVE
T CELL RESULTS - XM ALLO: POSITIVE
T CELL RESULTS - XM AUTO: NEGATIVE
T MCS AVERAGE - XM ALLO: 52.8
T MCS AVERAGE - XM ALLO: 84
T MCS AVERAGE - XM AUTO: 10.6
TARGETS BLD QL SMEAR: ABNORMAL
TOXIC GRANULES BLD QL SMEAR: PRESENT
TR MAX PG: 31 MMHG
VT: 400
WBC # BLD AUTO: 11.58 K/UL (ref 3.9–12.7)
WBC # BLD AUTO: 21.5 K/UL (ref 3.9–12.7)
WBC # BLD AUTO: 21.97 K/UL (ref 3.9–12.7)
WBC # BLD AUTO: 23.11 K/UL (ref 3.9–12.7)
WBC # BLD AUTO: 4.79 K/UL (ref 3.9–12.7)
WBC # BLD AUTO: 7.67 K/UL (ref 3.9–12.7)

## 2019-12-18 PROCEDURE — 94003 VENT MGMT INPAT SUBQ DAY: CPT

## 2019-12-18 PROCEDURE — 80053 COMPREHEN METABOLIC PANEL: CPT

## 2019-12-18 PROCEDURE — 80053 COMPREHEN METABOLIC PANEL: CPT | Mod: 91

## 2019-12-18 PROCEDURE — 83735 ASSAY OF MAGNESIUM: CPT | Mod: 91

## 2019-12-18 PROCEDURE — 99900035 HC TECH TIME PER 15 MIN (STAT)

## 2019-12-18 PROCEDURE — 25000003 PHARM REV CODE 250: Performed by: INTERNAL MEDICINE

## 2019-12-18 PROCEDURE — 83605 ASSAY OF LACTIC ACID: CPT

## 2019-12-18 PROCEDURE — 25000003 PHARM REV CODE 250: Performed by: SURGERY

## 2019-12-18 PROCEDURE — 94761 N-INVAS EAR/PLS OXIMETRY MLT: CPT

## 2019-12-18 PROCEDURE — 99233 PR SUBSEQUENT HOSPITAL CARE,LEVL III: ICD-10-PCS | Mod: ,,, | Performed by: SURGERY

## 2019-12-18 PROCEDURE — 94799 UNLISTED PULMONARY SVC/PX: CPT

## 2019-12-18 PROCEDURE — 94010 BREATHING CAPACITY TEST: CPT

## 2019-12-18 PROCEDURE — 85025 COMPLETE CBC W/AUTO DIFF WBC: CPT | Mod: 91

## 2019-12-18 PROCEDURE — P9016 RBC LEUKOCYTES REDUCED: HCPCS

## 2019-12-18 PROCEDURE — 99291 CRITICAL CARE FIRST HOUR: CPT | Mod: ,,, | Performed by: INTERNAL MEDICINE

## 2019-12-18 PROCEDURE — 63600175 PHARM REV CODE 636 W HCPCS: Performed by: SURGERY

## 2019-12-18 PROCEDURE — C9113 INJ PANTOPRAZOLE SODIUM, VIA: HCPCS | Performed by: SURGERY

## 2019-12-18 PROCEDURE — 99900026 HC AIRWAY MAINTENANCE (STAT)

## 2019-12-18 PROCEDURE — 82800 BLOOD PH: CPT

## 2019-12-18 PROCEDURE — 63600175 PHARM REV CODE 636 W HCPCS: Performed by: STUDENT IN AN ORGANIZED HEALTH CARE EDUCATION/TRAINING PROGRAM

## 2019-12-18 PROCEDURE — 85014 HEMATOCRIT: CPT

## 2019-12-18 PROCEDURE — 82330 ASSAY OF CALCIUM: CPT

## 2019-12-18 PROCEDURE — 25000003 PHARM REV CODE 250: Performed by: STUDENT IN AN ORGANIZED HEALTH CARE EDUCATION/TRAINING PROGRAM

## 2019-12-18 PROCEDURE — 86900 BLOOD TYPING SEROLOGIC ABO: CPT

## 2019-12-18 PROCEDURE — 84295 ASSAY OF SERUM SODIUM: CPT

## 2019-12-18 PROCEDURE — 99291 PR CRITICAL CARE, E/M 30-74 MINUTES: ICD-10-PCS | Mod: ,,, | Performed by: INTERNAL MEDICINE

## 2019-12-18 PROCEDURE — 37799 UNLISTED PX VASCULAR SURGERY: CPT

## 2019-12-18 PROCEDURE — 94150 VITAL CAPACITY TEST: CPT

## 2019-12-18 PROCEDURE — 99233 SBSQ HOSP IP/OBS HIGH 50: CPT | Mod: ,,, | Performed by: SURGERY

## 2019-12-18 PROCEDURE — 82803 BLOOD GASES ANY COMBINATION: CPT

## 2019-12-18 PROCEDURE — 99233 SBSQ HOSP IP/OBS HIGH 50: CPT | Mod: ,,, | Performed by: NURSE PRACTITIONER

## 2019-12-18 PROCEDURE — 63600175 PHARM REV CODE 636 W HCPCS: Performed by: PHYSICIAN ASSISTANT

## 2019-12-18 PROCEDURE — 99900017 HC EXTUBATION W/PARAMETERS (STAT)

## 2019-12-18 PROCEDURE — 99233 PR SUBSEQUENT HOSPITAL CARE,LEVL III: ICD-10-PCS | Mod: ,,, | Performed by: NURSE PRACTITIONER

## 2019-12-18 PROCEDURE — 20000000 HC ICU ROOM

## 2019-12-18 PROCEDURE — 27000221 HC OXYGEN, UP TO 24 HOURS

## 2019-12-18 PROCEDURE — 84132 ASSAY OF SERUM POTASSIUM: CPT

## 2019-12-18 PROCEDURE — 85610 PROTHROMBIN TIME: CPT

## 2019-12-18 PROCEDURE — 63600175 PHARM REV CODE 636 W HCPCS: Performed by: INTERNAL MEDICINE

## 2019-12-18 RX ORDER — FUROSEMIDE 10 MG/ML
80 INJECTION INTRAMUSCULAR; INTRAVENOUS ONCE
Status: COMPLETED | OUTPATIENT
Start: 2019-12-18 | End: 2019-12-18

## 2019-12-18 RX ORDER — DIPHENHYDRAMINE HCL 25 MG
25 CAPSULE ORAL ONCE
Status: COMPLETED | OUTPATIENT
Start: 2019-12-18 | End: 2019-12-18

## 2019-12-18 RX ORDER — GLUCAGON 1 MG
1 KIT INJECTION
Status: DISCONTINUED | OUTPATIENT
Start: 2019-12-18 | End: 2019-12-18

## 2019-12-18 RX ORDER — INSULIN ASPART 100 [IU]/ML
0-5 INJECTION, SOLUTION INTRAVENOUS; SUBCUTANEOUS
Status: DISCONTINUED | OUTPATIENT
Start: 2019-12-18 | End: 2019-12-18

## 2019-12-18 RX ORDER — ACETAMINOPHEN 325 MG/1
650 TABLET ORAL ONCE
Status: COMPLETED | OUTPATIENT
Start: 2019-12-18 | End: 2019-12-18

## 2019-12-18 RX ORDER — DOBUTAMINE HYDROCHLORIDE 400 MG/100ML
5 INJECTION, SOLUTION INTRAVENOUS CONTINUOUS
Status: DISCONTINUED | OUTPATIENT
Start: 2019-12-18 | End: 2019-12-18

## 2019-12-18 RX ORDER — IBUPROFEN 200 MG
16 TABLET ORAL
Status: DISCONTINUED | OUTPATIENT
Start: 2019-12-18 | End: 2019-12-30 | Stop reason: HOSPADM

## 2019-12-18 RX ORDER — GLUCAGON 1 MG
1 KIT INJECTION
Status: DISCONTINUED | OUTPATIENT
Start: 2019-12-18 | End: 2019-12-30 | Stop reason: HOSPADM

## 2019-12-18 RX ORDER — HYDROCODONE BITARTRATE AND ACETAMINOPHEN 500; 5 MG/1; MG/1
TABLET ORAL
Status: DISCONTINUED | OUTPATIENT
Start: 2019-12-18 | End: 2019-12-24

## 2019-12-18 RX ORDER — NYSTATIN 100000 [USP'U]/ML
500000 SUSPENSION ORAL
Status: DISCONTINUED | OUTPATIENT
Start: 2019-12-18 | End: 2019-12-30 | Stop reason: HOSPADM

## 2019-12-18 RX ORDER — INSULIN ASPART 100 [IU]/ML
0-5 INJECTION, SOLUTION INTRAVENOUS; SUBCUTANEOUS
Status: DISCONTINUED | OUTPATIENT
Start: 2019-12-18 | End: 2019-12-30 | Stop reason: HOSPADM

## 2019-12-18 RX ORDER — METHYLPREDNISOLONE SOD SUCC 125 MG
125 VIAL (EA) INJECTION ONCE
Status: COMPLETED | OUTPATIENT
Start: 2019-12-18 | End: 2019-12-18

## 2019-12-18 RX ORDER — IBUPROFEN 200 MG
24 TABLET ORAL
Status: DISCONTINUED | OUTPATIENT
Start: 2019-12-18 | End: 2019-12-30 | Stop reason: HOSPADM

## 2019-12-18 RX ADMIN — FUROSEMIDE 80 MG: 10 INJECTION, SOLUTION INTRAMUSCULAR; INTRAVENOUS at 04:12

## 2019-12-18 RX ADMIN — DIPHENHYDRAMINE HYDROCHLORIDE 25 MG: 25 CAPSULE ORAL at 10:12

## 2019-12-18 RX ADMIN — ACETAMINOPHEN 650 MG: 325 TABLET ORAL at 10:12

## 2019-12-18 RX ADMIN — DEXMEDETOMIDINE HYDROCHLORIDE 1.4 MCG/KG/HR: 4 INJECTION, SOLUTION INTRAVENOUS at 08:12

## 2019-12-18 RX ADMIN — FUROSEMIDE 30 MG/HR: 10 INJECTION, SOLUTION INTRAMUSCULAR; INTRAVENOUS at 10:12

## 2019-12-18 RX ADMIN — ANTI-THYMOCYTE GLOBULIN (RABBIT) 150 MG: 5 INJECTION, POWDER, LYOPHILIZED, FOR SOLUTION INTRAVENOUS at 11:12

## 2019-12-18 RX ADMIN — METHYLPREDNISOLONE SODIUM SUCCINATE 125 MG: 125 INJECTION, POWDER, FOR SOLUTION INTRAMUSCULAR; INTRAVENOUS at 10:12

## 2019-12-18 RX ADMIN — MUPIROCIN 1 G: 20 OINTMENT TOPICAL at 08:12

## 2019-12-18 RX ADMIN — PREDNISONE 25 MG: 10 TABLET ORAL at 08:12

## 2019-12-18 RX ADMIN — NYSTATIN 500000 UNITS: 100000 SUSPENSION ORAL at 12:12

## 2019-12-18 RX ADMIN — NICARDIPINE HYDROCHLORIDE 1 MG/HR: 0.2 INJECTION, SOLUTION INTRAVENOUS at 12:12

## 2019-12-18 RX ADMIN — DEXMEDETOMIDINE HYDROCHLORIDE 1.4 MCG/KG/HR: 4 INJECTION, SOLUTION INTRAVENOUS at 12:12

## 2019-12-18 RX ADMIN — DEXTROSE 1000 MG: 5 SOLUTION INTRAVENOUS at 09:12

## 2019-12-18 RX ADMIN — DEXTROSE 1000 MG: 5 SOLUTION INTRAVENOUS at 08:12

## 2019-12-18 RX ADMIN — FENTANYL CITRATE 25 MCG: 50 INJECTION INTRAMUSCULAR; INTRAVENOUS at 01:12

## 2019-12-18 RX ADMIN — POLYETHYLENE GLYCOL 3350 17 G: 17 POWDER, FOR SOLUTION ORAL at 08:12

## 2019-12-18 RX ADMIN — EPINEPHRINE 0.08 MCG/KG/MIN: 1 INJECTION INTRAMUSCULAR; INTRAVENOUS; SUBCUTANEOUS at 08:12

## 2019-12-18 RX ADMIN — FUROSEMIDE 80 MG: 10 INJECTION, SOLUTION INTRAMUSCULAR; INTRAVENOUS at 09:12

## 2019-12-18 RX ADMIN — METHYLPREDNISOLONE SODIUM SUCCINATE 40 MG: 40 INJECTION, POWDER, FOR SOLUTION INTRAMUSCULAR; INTRAVENOUS at 07:12

## 2019-12-18 RX ADMIN — OXYCODONE HYDROCHLORIDE 10 MG: 10 TABLET ORAL at 07:12

## 2019-12-18 RX ADMIN — PANTOPRAZOLE SODIUM 40 MG: 40 INJECTION, POWDER, FOR SOLUTION INTRAVENOUS at 08:12

## 2019-12-18 RX ADMIN — DEXMEDETOMIDINE HYDROCHLORIDE 1.4 MCG/KG/HR: 4 INJECTION, SOLUTION INTRAVENOUS at 02:12

## 2019-12-18 RX ADMIN — FUROSEMIDE 20 MG/HR: 10 INJECTION, SOLUTION INTRAMUSCULAR; INTRAVENOUS at 08:12

## 2019-12-18 RX ADMIN — DOBUTAMINE HYDROCHLORIDE 2.5 MCG/KG/MIN: 200 INJECTION INTRAVENOUS at 09:12

## 2019-12-18 RX ADMIN — NYSTATIN 500000 UNITS: 100000 SUSPENSION ORAL at 08:12

## 2019-12-18 RX ADMIN — OXYCODONE HYDROCHLORIDE 10 MG: 10 TABLET ORAL at 03:12

## 2019-12-18 RX ADMIN — FENTANYL CITRATE 25 MCG: 50 INJECTION INTRAMUSCULAR; INTRAVENOUS at 07:12

## 2019-12-18 RX ADMIN — DEXMEDETOMIDINE HYDROCHLORIDE 1.4 MCG/KG/HR: 4 INJECTION, SOLUTION INTRAVENOUS at 05:12

## 2019-12-18 RX ADMIN — NYSTATIN 500000 UNITS: 100000 SUSPENSION ORAL at 04:12

## 2019-12-18 RX ADMIN — FENTANYL CITRATE 25 MCG: 50 INJECTION INTRAMUSCULAR; INTRAVENOUS at 04:12

## 2019-12-18 RX ADMIN — ASPIRIN 325 MG ORAL TABLET 325 MG: 325 PILL ORAL at 08:12

## 2019-12-18 NOTE — ASSESSMENT & PLAN NOTE
BG goal 140 - 180     Discontinue IV insulin infusion protocol. BG is within or below goal without use of insulin therapy.   Low Dose SQ Insulin Correction Scale PRN BG excursions.   BG monitoring every 4 hours while NPO.    ADDENDUM:   notified per nursing staff that diet will be advancing.   BG Monitoring AC/HS   Low Dose SQ Insulin Correction Scale PRN BG excursions.     ** Please call Endocrine for any BG related issues **  ** Please notify Endocrine for any change and/or advance in diet**      Discharge planning: JONAS

## 2019-12-18 NOTE — CARE UPDATE
Pt extubated per MD order. Pt tolerated extubation well and placed on 3L NC. Will continue to monitor.

## 2019-12-18 NOTE — PLAN OF CARE
Problem: Adult Inpatient Plan of Care  Goal: Plan of Care Review  Outcome: Ongoing, Progressing   The patient remains intubated on A/C mode of ventilation, FiO2 40%, 5 of PEEP, and a rate of 20.  Gtts/Infusions:  Epi gtt @ 0.02 mcg/kg/min, Precedex gtt @ 1.4 mcg/kg/min; Cardene gtt needed intermittently overnight to keep MAP < 80 mmHg.  Insulin gtt remained off all night.  Patient's K+ was elevated overnight to 5.9; shifted with insulin; 20mg of Lasix also administered.  UOP increasing overnight, Cr 3.2 on AM labs, K+ 5.5.  Plans to extubate this AM.  Plan of care reviewed with the patient.

## 2019-12-18 NOTE — PLAN OF CARE
"Dx: Status post heart transplant    Shift Events: Patient and patient's friend updated on plan of care. Patient extubated this morning without any issues. Patient not being paced anymore but has back up rate at 70. Lasix gtt and Dobutamine gtt started, but Dobutamine gtt now dc'd due to patient having hypotension issues. Heart transplant service informed and aware of patient's MAP's in the 50's since about 1300. Epi gtt increased and STAT echo ordered and completed. Patient completely asymptomatic to hypotension. All four extremities are warm to touch and have +2 pulses. 1 unit of PRBC's given for a decrease in H&H. Lasix gtt increased to 20mg/hr with a 80mg bolus dose. CVP 17, 15 All questions and concerns acknowledged and answered. See flow sheets for full assessment details. Will continue to monitor patient closely.     Neuro: AAO x4, Follows Commands and Moves All Extremities    Vital Signs: BP (!) 79/52 (BP Location: Right arm, Patient Position: Lying)   Pulse 102   Temp 99.1 °F (37.3 °C) (Core Bladder)   Resp (!) 22   Ht 5' 6" (1.676 m)   Wt 99.8 kg (220 lb)   LMP  (LMP Unknown)   SpO2 96%   Breastfeeding? No   BMI 35.51 kg/m²     Diet: Clear Liquid    Gtts: Epinephrine and Lasix     Urine Output: Urinary Catheter 870 cc/shift    Drains: Chest Tube, total output 100 cc /  shift (70ml from pleurals and 30ml from meds)     Labs/Accuchecks: q4 hour CBC's, BMP's, and Mag; AC/HS accuchecks    Skin: Midline incision CDI with edges approximated and steri strips intact. New telfa island dressing applied today. AICD and LVAD driveline site covered with with gauze dressings and are CDI. No skin breakdown to elbows, heels, or sacrum.        "

## 2019-12-18 NOTE — ASSESSMENT & PLAN NOTE
-Transplant Date 12/16/2019  -CTS Primary  -Transplanted by: Dr. Nuñez  -CMV Status:D-/R+   -Rejection status: Low Risk  -Hemodynamic support with: epi down to 0.02 mcg/kg/min IV  -will likely dose with thymo today due to Cr of 3.3  -Current immunosuppression: steroids IV BID as well as MMF 1000 BID  -Chest tube management  & pacer wires per CTS team

## 2019-12-18 NOTE — PROGRESS NOTES
[facility]  Heart Transplant  Progress Note    Patient Name: Deborah Navas  MRN: [id number]  Admission Date: 11/13/2019  Hospital Length of Stay: 33 days  Attending Physician: Talha Nuñez MD  Primary Care Provider: Primary Doctor No  Principal Problem:Status post heart transplant    Subjective:     Interval History: remains on epi and intubated/sedated  CVP kavitha overnight and given one dose of IV push lasix 20 mg IV    Continuous Infusions:   dexmedetomidine (PRECEDEX) infusion 1.4 mcg/kg/hr (12/18/19 0805)    epinephrine 0.02 mcg/kg/min (12/18/19 0700)    insulin (HUMAN R) infusion (adults) Stopped (12/17/19 0300)    niCARdipine 5 mg/hr (12/18/19 0800)    norepinephrine bitartrate-D5W Stopped (12/17/19 1300)    propofol Stopped (12/17/19 1500)    vasopressin (PITRESSIN) infusion Stopped (12/17/19 0830)     Scheduled Meds:   aspirin  325 mg Oral Daily    methylPREDNISolone sodium succinate  40 mg Intravenous Q12H    mupirocin  1 g Nasal BID    mycophenolate (CELLCEPT) IVPB  1,000 mg Intravenous Q12H    pantoprazole  40 mg Intravenous Daily    polyethylene glycol  17 g Oral Daily     PRN Meds:sodium chloride, acetaminophen, acetaminophen, bisacodyl, calcium gluconate IVPB, calcium gluconate IVPB, calcium gluconate IVPB, [COMPLETED] calcium gluconate IVPB **AND** calcium gluconate IVPB, Dextrose 10% Bolus, Dextrose 10% Bolus, [COMPLETED] Dextrose 10% Bolus **AND** Dextrose 10% Bolus **AND** [COMPLETED] insulin regular, fentaNYL, magnesium sulfate IVPB, magnesium sulfate IVPB, ondansetron, ondansetron, oxyCODONE, oxyCODONE, potassium chloride in water **AND** potassium chloride in water **AND** potassium chloride in water, sodium phosphate IVPB, sodium phosphate IVPB, sodium phosphate IVPB    Review of patient's allergies indicates:   Allergen Reactions    Adhesive Blisters     Reaction to area in chest and up only    Codeine Itching     Objective:     Vital Signs  (Most Recent):  Temp: 99.1 °F (37.3 °C) (12/18/19 0830)  Pulse: (!) 111 (12/18/19 0830)  Resp: (!) 9 (12/18/19 0830)  BP: 121/69 (12/18/19 0800)  SpO2: 98 % (12/18/19 0830) Vital Signs (24h Range):  Temp:  [87.8 °F (31 °C)-100.4 °F (38 °C)] 99.1 °F (37.3 °C)  Pulse:  [] 111  Resp:  [9-44] 9  SpO2:  [94 %-100 %] 98 %  BP: ()/(52-74) 121/69  Arterial Line BP: ()/(44-75) 111/65     Patient Vitals for the past 72 hrs (Last 3 readings):   Weight   12/16/19 0205 100.1 kg (220 lb 10.9 oz)   12/15/19 1000 101.2 kg (223 lb)     Body mass index is 35.62 kg/m².      Intake/Output Summary (Last 24 hours) at 12/18/2019 0855  Last data filed at 12/18/2019 0800  Gross per 24 hour   Intake 1823.67 ml   Output 1132 ml   Net 691.67 ml       Hemodynamic Parameters:         Physical Exam   Constitutional: She appears well-developed and well-nourished. No distress.   HENT:   Head: Normocephalic and atraumatic.   Eyes: Conjunctivae are normal.   Neck: Neck supple.   Cardiovascular:   Atrial paced at 110.    Pulmonary/Chest: Effort normal.   Chest tubes intact. Serosanguinous drainage. Pacing wires in place.    Abdominal: Soft. There is no tenderness.   Musculoskeletal: She exhibits no deformity.   Right femoral access site covered with dressing. No issues.    Neurological: She is alert.   Skin: Skin is warm and dry.   Psychiatric:   Unable to assess.    Nursing note and vitals reviewed.      Significant Labs:  CBC:  Recent Labs   Lab 12/17/19  2355 12/18/19  0333 12/18/19  0334 12/18/19  0754   WBC 23.11* 21.50*  --  21.97*   RBC 2.76* 2.74*  --  2.69*   HGB 7.5* 7.4*  --  7.5*   HCT 24.0* 23.9* 24* 23.3*    173  --  180   MCV 87 87  --  87   MCH 27.2 27.0  --  27.9   MCHC 31.3* 31.0*  --  32.2     BNP:  Recent Labs   Lab 12/13/19  0400 12/16/19  0158   * 271*     CMP:  Recent Labs   Lab 12/17/19  2355 12/18/19  0333 12/18/19  0754   * 136* 133*   CALCIUM 9.2 9.1 9.1   ALBUMIN 3.5 3.5 3.4*   PROT  5.7* 5.6* 5.6*   * 136 134*   K 5.6* 5.5* 5.4*   CO2 24 23 22*    102 100   BUN 43* 46* 52*   CREATININE 3.3* 3.2* 3.3*   ALKPHOS 49* 50* 49*   * 94* 74*   * 288* 239*   BILITOT 0.4 0.4 0.4      Coagulation:   Recent Labs   Lab 12/16/19  1613 12/17/19  0102 12/17/19  0459 12/18/19  0333   INR 1.3* 1.3* 1.3* 1.4*   APTT 27.4 23.1 24.5  --      LDH:  Recent Labs   Lab 12/16/19  0158        Microbiology:  Microbiology Results (last 7 days)     Procedure Component Value Units Date/Time    Urine Culture High Risk [[id number]] Collected:  12/15/19 0646    Order Status:  Completed Specimen:  Urine, Catheterized Updated:  12/16/19 0840     Urine Culture, Routine No significant growth    Narrative:       Indicated criteria for high risk culture:->Other  Other (specify):->awaiting heart transplant    Urine culture [[id number]] Collected:  12/15/19 0646    Order Status:  Canceled Specimen:  Urine, Catheterized           I have reviewed all pertinent labs within the past 24 hours.    Estimated Creatinine Clearance: 24.3 mL/min (A) (based on SCr of 3.3 mg/dL (H)).    Diagnostic Results:  I have reviewed and interpreted all pertinent imaging results/findings within the past 24 hours.    Assessment and Plan:     50 yo WF with NICM, s/p HM3 implantation 9/10/19 (post up coarse uncomplicated with the exception of+ diaphragmatic stimulation of LV lead and pleural effusion with chest tube placement, atrial flutter with NADINE/DCCV), V-fib reported in setting of hypokalemia with appropriate shock from ICD on Amiodarone prior to LVAD placement; and recent hospitalizations for ventricular arrythmias; started on Mexilitine.  She was seen in EP clinic today and she continues to have multiple VT episodes with some ATP therapy, although no ICD shocks since starting mexiletine 10/24/2019. One slow VT episode 2.5hrs 11/3/2019. Patient asymptomatic with LVAD. On coumadin. No AFL since post-op event (paced out of  rhythm 9/24/2019). CHB with 100% V pacing (LV lead off). She does not feel well. Dry heaving with mexiletine. Taking phenergan PRN. 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   She was seen in HTS clinic and reported 4 low flow alarms the last 4 nights.  She reports they occur in her sleep and last for only a few seconds.  By the time she wakes up; they quickly stop.  She does report to possibly being little volume overloaded.  She hasn't noticed weight gain at home but thinks she may have little extra fluid.  She denies SOB, chest pain, palpitations, LEGER. In review of her records: she was 223lbs on 10/24/19 during previous hospitalization and is 235lbs now.  Her lasix had previously been decreased to prn.     * Status post heart transplant  -Transplant Date 12/16/2019  -CTS Primary  -Transplanted by: Dr. Nuñez  -CMV Status:D-/R+   -Rejection status: Low Risk  -Hemodynamic support with: epi down to 0.02 mcg/kg/min IV  -will likely dose with thymo today due to Cr of 3.3  -Current immunosuppression: steroids IV BID as well as MMF 1000 BID  -Chest tube management  & pacer wires per CTS team    Nausea  - has intermittent nausea/vomiting, likely multifactorial with RVF and mexiletine  - on BID PPI as well as sucralfate, and H2 blocker   - judicious use of zofran and phenergan due to hx of VT storm    GLO (acute kidney injury)  - see CKD    Organ transplant candidate  - will list with exemption due to VT with ICD shocks and RV failure (will not tolerate ) and does not tolerate NO wean  - approved for transplant on 11/26. Tier 2.     Left ventricular assist device (LVAD) complication  - hx low flow alarms prior to admit, last LFA 12/1  - Possibly secondary to ADHF--> RV failure  - Formal report of CT unremarkable; however, it was a non contrast as creat was elevated above baseline on admit and inflow and outflow cannulas not accurately visualized    Essential hypertension  -remains  on cardene intermittently       Ventricular tachycardia  - Per EP office visit on day of admission: She continues to have multiple VT episodes with some ATP therapy, although no ICD shocks since starting mexiletine 10/24/2019. One slow VT episode 2.5hrs 11/3/2019. Patient asymptomatic with LVAD. On coumadin. No AFL since post-op event (paced out of rhythm 9/24/2019). CHB with 100% V pacing (LV lead off).   - Plan was to continue current regimen despite Mexiletine making her nauseous.  - Decreased Amiodarone to daily per EP plan on discharge last hospitalization   - Shocked x 3 11/25 for MMVT (in addition has had numerous successful ATP's for MMVT)   - S/P amio load   - now on Amio 400 BID and mexiletine increased to 200  - ICD interrogation 12/10 due to palpitations per Dr Lua- no events noted    LVAD (left ventricular assist device) present  - HeartMate 3 Implanted 9/10/19 as DT.  - Implanted by Dr. Walden  - INR theraputic.  - Antiplatelets:  mg.  - LDH is stable Will monitor daily.   - Speed set at 5100  - Epi @ .01, NO @ 20.   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 Urgent selection 11/26, approved for transplant. Listed tier 2.    Procedure: Device Interrogation Including analysis of device parameters  Current Settings: Ventricular Assist Device  Review of device function is stable    TXP LVAD INTERROGATIONS 12/15/2019 12/15/2019 12/15/2019 12/15/2019 12/15/2019 12/15/2019 12/15/2019   Type HeartMate3 HeartMate3 HeartMate3 HeartMate3 HeartMate3 HeartMate3 HeartMate3   Flow 5.0 4.8 4.8 4.6 5.0 4.9 4.6   Speed 5650 5850 5800 5800 5800 5800 5800   PI 2.0 3.1 3.0 2.9 2.7 2.7 3.4   Power ([place]) 4.3 4.4 4.6 4.6 4.5 4.5 4.5   LSL 5400 5400 5400 5400 5400 5400 5400   Pulsatility Pulse Intermittent pulse No Pulse No Pulse No Pulse No Pulse No Pulse       CKD (chronic kidney disease)  - Baseline creat appears 1.3-1.6.   - CVP 17, given one dose of IV push lasix overnight, would recommend diuresis      Acute on chronic combined systolic and diastolic heart failure        ICD (implantable cardioverter-defibrillator) in place  - Med[employer]onic ICD  - Shocked x 3 11/25 for MMVT   - Loaded with IV amio on 11/25. Now on amio 400 PO BID and mexiletine increased to 200 QD   - See above VT        ZAC [place]  Heart Transplant  [facility]

## 2019-12-18 NOTE — SUBJECTIVE & OBJECTIVE
Interval History: remains on epi and intubated/sedated  CVP kavitha overnight and given one dose of IV push lasix 20 mg IV    Continuous Infusions:   dexmedetomidine (PRECEDEX) infusion 1.4 mcg/kg/hr (12/18/19 0805)    epinephrine 0.02 mcg/kg/min (12/18/19 0700)    insulin (HUMAN R) infusion (adults) Stopped (12/17/19 0300)    niCARdipine 5 mg/hr (12/18/19 0800)    norepinephrine bitartrate-D5W Stopped (12/17/19 1300)    propofol Stopped (12/17/19 1500)    vasopressin (PITRESSIN) infusion Stopped (12/17/19 0830)     Scheduled Meds:   aspirin  325 mg Oral Daily    methylPREDNISolone sodium succinate  40 mg Intravenous Q12H    mupirocin  1 g Nasal BID    mycophenolate (CELLCEPT) IVPB  1,000 mg Intravenous Q12H    pantoprazole  40 mg Intravenous Daily    polyethylene glycol  17 g Oral Daily     PRN Meds:sodium chloride, acetaminophen, acetaminophen, bisacodyl, calcium gluconate IVPB, calcium gluconate IVPB, calcium gluconate IVPB, [COMPLETED] calcium gluconate IVPB **AND** calcium gluconate IVPB, Dextrose 10% Bolus, Dextrose 10% Bolus, [COMPLETED] Dextrose 10% Bolus **AND** Dextrose 10% Bolus **AND** [COMPLETED] insulin regular, fentaNYL, magnesium sulfate IVPB, magnesium sulfate IVPB, ondansetron, ondansetron, oxyCODONE, oxyCODONE, potassium chloride in water **AND** potassium chloride in water **AND** potassium chloride in water, sodium phosphate IVPB, sodium phosphate IVPB, sodium phosphate IVPB    Review of patient's allergies indicates:   Allergen Reactions    Adhesive Blisters     Reaction to area in chest and up only    Codeine Itching     Objective:     Vital Signs (Most Recent):  Temp: 99.1 °F (37.3 °C) (12/18/19 0830)  Pulse: (!) 111 (12/18/19 0830)  Resp: (!) 9 (12/18/19 0830)  BP: 121/69 (12/18/19 0800)  SpO2: 98 % (12/18/19 0830) Vital Signs (24h Range):  Temp:  [87.8 °F (31 °C)-100.4 °F (38 °C)] 99.1 °F (37.3 °C)  Pulse:  [] 111  Resp:  [9-44] 9  SpO2:  [94 %-100 %] 98 %  BP:  ()/(52-74) 121/69  Arterial Line BP: ()/(44-75) 111/65     Patient Vitals for the past 72 hrs (Last 3 readings):   Weight   12/16/19 0205 100.1 kg (220 lb 10.9 oz)   12/15/19 1000 101.2 kg (223 lb)     Body mass index is 35.62 kg/m².      Intake/Output Summary (Last 24 hours) at 12/18/2019 0855  Last data filed at 12/18/2019 0800  Gross per 24 hour   Intake 1823.67 ml   Output 1132 ml   Net 691.67 ml       Hemodynamic Parameters:         Physical Exam   Constitutional: She appears well-developed and well-nourished. No distress.   HENT:   Head: Normocephalic and atraumatic.   Eyes: Conjunctivae are normal.   Neck: Neck supple.   Cardiovascular:   Atrial paced at 110.    Pulmonary/Chest: Effort normal.   Chest tubes intact. Serosanguinous drainage. Pacing wires in place.    Abdominal: Soft. There is no tenderness.   Musculoskeletal: She exhibits no deformity.   Right femoral access site covered with dressing. No issues.    Neurological: She is alert.   Skin: Skin is warm and dry.   Psychiatric:   Unable to assess.    Nursing note and vitals reviewed.      Significant Labs:  CBC:  Recent Labs   Lab 12/17/19 2355 12/18/19 0333 12/18/19  0334 12/18/19  0754   WBC 23.11* 21.50*  --  21.97*   RBC 2.76* 2.74*  --  2.69*   HGB 7.5* 7.4*  --  7.5*   HCT 24.0* 23.9* 24* 23.3*    173  --  180   MCV 87 87  --  87   MCH 27.2 27.0  --  27.9   MCHC 31.3* 31.0*  --  32.2     BNP:  Recent Labs   Lab 12/13/19  0400 12/16/19  0158   * 271*     CMP:  Recent Labs   Lab 12/17/19 2355 12/18/19  0333 12/18/19  0754   * 136* 133*   CALCIUM 9.2 9.1 9.1   ALBUMIN 3.5 3.5 3.4*   PROT 5.7* 5.6* 5.6*   * 136 134*   K 5.6* 5.5* 5.4*   CO2 24 23 22*    102 100   BUN 43* 46* 52*   CREATININE 3.3* 3.2* 3.3*   ALKPHOS 49* 50* 49*   * 94* 74*   * 288* 239*   BILITOT 0.4 0.4 0.4      Coagulation:   Recent Labs   Lab 12/16/19  1613 12/17/19  0102 12/17/19  0459 12/18/19  0333   INR 1.3* 1.3*  1.3* 1.4*   APTT 27.4 23.1 24.5  --      LDH:  Recent Labs   Lab 12/16/19  0158        Microbiology:  Microbiology Results (last 7 days)     Procedure Component Value Units Date/Time    Urine Culture High Risk [[id number]] Collected:  12/15/19 0646    Order Status:  Completed Specimen:  Urine, Catheterized Updated:  12/16/19 0840     Urine Culture, Routine No significant growth    Narrative:       Indicated criteria for high risk culture:->Other  Other (specify):->awaiting heart transplant    Urine culture [[id number]] Collected:  12/15/19 0646    Order Status:  Canceled Specimen:  Urine, Catheterized           I have reviewed all pertinent labs within the past 24 hours.    Estimated Creatinine Clearance: 24.3 mL/min (A) (based on SCr of 3.3 mg/dL (H)).    Diagnostic Results:  I have reviewed and interpreted all pertinent imaging results/findings within the past 24 hours.

## 2019-12-18 NOTE — NURSING
Rounds Report: Attended interdisciplinary rounds this afternoon   with the transplant team including SW, physicians, fellows,  mid-level providers, and transplant coordinators.Discussed plan of care. Still under the care of the [facility] and [facility].     Heart Transplant Coordinator   Met with patient and friend, Tanika, today. Pt extubated and in good spirits. She is receiving a unit of PRBC's. Told her I was her coordinator in the Post Heart Transplant arena. I said I would bring her written materials before the weekend.

## 2019-12-18 NOTE — SUBJECTIVE & OBJECTIVE
"Interval HPI:   Overnight events:  BG is within or below goal on intensive IV insulin infusion. Patient has not required insulin therapy for glycemic control, and is tolerating steroid therapy. No dx of dm noted.   No diet orders on file  Diet clear liquid    Eating:   <25%  Nausea: No  Hypoglycemia and intervention: No  Fever: No  TPN and/or TF: No  If yes, type of TF/TPN and rate: None    /69 (BP Location: Right arm, Patient Position: Lying)   Pulse (!) 111   Temp 99.1 °F (37.3 °C)   Resp (!) 9   Ht 5' 6" (1.676 m)   Wt 100.1 kg (220 lb 10.9 oz)   LMP  (LMP Unknown)   SpO2 98%   Breastfeeding? No   BMI 35.62 kg/m²     Labs Reviewed and Include    Recent Labs   Lab 12/18/19  0754   *   CALCIUM 9.1   ALBUMIN 3.4*   PROT 5.6*   *   K 5.4*   CO2 22*      BUN 52*   CREATININE 3.3*   ALKPHOS 49*   ALT 74*   *   BILITOT 0.4     Lab Results   Component Value Date    WBC 21.97 (H) 12/18/2019    HGB 7.5 (L) 12/18/2019    HCT 23.3 (L) 12/18/2019    MCV 87 12/18/2019     12/18/2019     No results for input(s): TSH, FREET4 in the last 168 hours.  Lab Results   Component Value Date    HGBA1C 6.5 (H) 09/06/2019       Nutritional status:   Body mass index is 35.62 kg/m².  Lab Results   Component Value Date    ALBUMIN 3.4 (L) 12/18/2019    ALBUMIN 3.5 12/18/2019    ALBUMIN 3.5 12/17/2019     Lab Results   Component Value Date    PREALBUMIN 27 12/16/2019    PREALBUMIN 28 12/13/2019    PREALBUMIN 29 12/11/2019       Estimated Creatinine Clearance: 24.3 mL/min (A) (based on SCr of 3.3 mg/dL (H)).    Accu-Checks  Recent Labs     12/17/19  2138 12/17/19  2202 12/17/19  2353 12/18/19  0111 12/18/19  0202 12/18/19  0331 12/18/19  0521 12/18/19  0618 12/18/19  0753 12/18/19  0859   POCTGLUCOSE 225* 172* 141* 141* 143* 134* 132* 136* 128* 129*       Current Medications and/or Treatments Impacting Glycemic Control  Immunotherapy:    Immunosuppressants         Stop Route Frequency     " mycophenolate (CELLCEPT) 1,000 mg in dextrose 5 % 100 mL IVPB      -- IV Every 12 hours        Steroids:   Hormones (From admission, onward)    Start     Stop Route Frequency Ordered    12/17/19 2000  methylPREDNISolone sodium succinate injection 40 mg      -- IV Every 12 hours 12/17/19 0917    12/16/19 1315  vasopressin (PITRESSIN) 0.2 Units/mL in dextrose 5 % 100 mL infusion      -- IV Continuous 12/16/19 1214        Pressors:    Autonomic Drugs (From admission, onward)    Start     Stop Route Frequency Ordered    12/16/19 1315  EPINEPHrine (ADRENALIN) 5 mg in sodium chloride 0.9% 250 mL infusion     Question Answer Comment   Titrate by: (in mcg/kg/min) 0.01    Titrate interval: (in minutes) 5    Titrate to maintain: (SBP or MAP or Cardiac Index) SBP    Maximum dose: (in mcg/kg/min) 2        -- IV Continuous 12/16/19 1214 12/16/19 1315  norepinephrine 4 mg in dextrose 5% 250 mL infusion (premix) (titrating)     Question Answer Comment   Titrate by: (in mcg/kg/min) 0.05    Titrate interval: (in minutes) 5    Titrate to maintain: (MAP or SBP) MAP    Greater than: (in mmHg) 65    Maximum dose: (in mcg/kg/min) 3        -- IV Continuous 12/16/19 1214        Hyperglycemia/Diabetes Medications:   Antihyperglycemics (From admission, onward)    Start     Stop Route Frequency Ordered    12/16/19 1315  insulin regular (Humulin R) 100 Units in sodium chloride 0.9% 100 mL infusion     Question:  Insulin rate changes (DO NOT MODIFY ANSWER)  Answer:  [email or web address]\[facility] [email or web address] [id number]    -- IV Continuous 12/16/19 1214

## 2019-12-18 NOTE — ASSESSMENT & PLAN NOTE
49-year-old female with NICM s/p LVAD September 2019 now s/p heart transplant 12/16/19. Arrived intubated on levo, vaso, epi, and NO. Doing well.     Neuro:   -Fentanyl for pain  -Wean sedation   -full neuro exam     Cards:  -monitor hemodynamics  -monitor chest tube output, high initially but slowing down   - Left chest tube: 168cc   - Right chest tube: 830cc  -wean vaso, levo, and NO as tolerated  -MAP goal 60-80    Resp:  -wean to extubate today  -Minimal vent settings  -ABG as needed    Renal:  -monitor urine output  -trend BUN and creatinine  -BUN/Cr 26/2.5    Heme:  -trend H/H   - Received 1uRBC 12/17/19  -monitor chest tube output    FENGI:  -NPO for now  -replace lytes as needed  -GI ppx    Endo:  -monitor POC glucose  -insulin gtt for time being    ID:  -monitor WBC  -perioperative abx    Dispo: continue ICU care. Extubate today

## 2019-12-18 NOTE — PT/OT/SLP PROGRESS
Physical Therapy      Patient Name:  Deborah Navas   MRN:  [id number]    Patient not seen today. Pt extubated in AM. PT checked on pt in PM and pt with labile BP. MAP in the low 60s when present in room. Pt not medically appropriate for out of bed mobility on this date. Will follow-up when approparite.    Niurka Posadas, PT, DPT  12/18/2019  [phone]

## 2019-12-18 NOTE — NURSING
Called and informed Dr. Dougherty about patient's H&H dropping from 7.5 and 23.3 to 6.9 and 21.8. Dr. Dougherty ordered to recheck cbc in 4 hours. All VSS. Will continue to monitor patient closely.

## 2019-12-18 NOTE — PROGRESS NOTES
Update:    BENIGNO met with pt and caregiver Flavia in pt's room in order to provide continuity of care and support. Pt was alert and oriented, pleasant and engaged. Pt recently extubated from surgery and very excited. Pt just ending her visit with pet therapist and reports adequate coping. Per caregiver Flavia's report, pt's father and step-mother may come up to assist as caregivers. SW notified Flavia that SW will need to meet with father and step-mother and assess suitability before they can be responsible for pt's care. Flavia expressed understanding. Pt and caregivers denying further needs at this time. SW to review LR apartment information and financial profile with pt once she is further out from surgery. SW remains available for continued psychosocial support, education, resources, and additional d/c planning as needed.

## 2019-12-18 NOTE — NURSING
Patient extubated by RT per MD order. Extubation parameters obtained prior to extubation. Patient placed on 3L nasal cannula with SpO2 of 95-96%. No s/s of respiratory distress. All VSS. Will continue to monitor patient closely.

## 2019-12-18 NOTE — NURSING
Dr. Prado and Dr. Moses at the patient's bedside.  Notified of UOP, K+ level, Cr level, and Mg.  New orders to shift the patient's potassium; 10 units of insulin regular IV push, 250mL of D10%, 1g Calcium Gluconate.  In addition, new orders to administer 20mg of IV Lasix push.  Will carry out new orders.  Will continue to monitor patient.

## 2019-12-18 NOTE — SUBJECTIVE & OBJECTIVE
Interval History/Significant Events:   NAEON.  Pt doing well, weaning sedation and will attempt extubation later this morning.      Follow-up For: Procedure(s) (LRB):  TRANSPLANT, HEART (N/A)    Post-Operative Day: 1 Day Post-Op    Objective:     Vital Signs (Most Recent):  Temp: 98.6 °F (37 °C) (12/18/19 0945)  Pulse: 80 (12/18/19 0945)  Resp: 20 (12/18/19 0945)  BP: 106/62 (12/18/19 0900)  SpO2: 96 % (12/18/19 0945) Vital Signs (24h Range):  Temp:  [87.8 °F (31 °C)-100.4 °F (38 °C)] 98.6 °F (37 °C)  Pulse:  [] 80  Resp:  [9-44] 20  SpO2:  [94 %-100 %] 96 %  BP: ()/(52-74) 106/62  Arterial Line BP: ()/(44-75) 94/46     Weight: 100.1 kg (220 lb 10.9 oz)  Body mass index is 35.62 kg/m².      Intake/Output Summary (Last 24 hours) at 12/18/2019 1022  Last data filed at 12/18/2019 1000  Gross per 24 hour   Intake 1993.67 ml   Output 1152 ml   Net 841.67 ml       Physical Exam     Constitutional: She appears well-developed and well-nourished. No distress.   HENT:   Head: Normocephalic and atraumatic.   Eyes: Conjunctivae are normal.   Neck: Neck supple.   Cardiovascular:   Atrial paced at 110.    Pulmonary/Chest: Effort normal.   Chest tubes intact. Serosanguinous drainage. Pacing wires in place.    Abdominal: Soft. There is no tenderness.   Musculoskeletal: She exhibits no deformity.   Right femoral access site covered with dressing. No issues.    Neurological: She is alert.   Skin: Skin is warm and dry.   Psychiatric:   Unable to assess.    Nursing note and vitals reviewed.       Vents:  Vent Mode: Spont (12/18/19 0915)  Set Rate: 20 bmp (12/18/19 0756)  Vt Set: 400 mL (12/18/19 0756)  Pressure Support: 10 cmH20 (12/18/19 0915)  PEEP/CPAP: 5 cmH20 (12/18/19 0915)  Oxygen Concentration (%): 32 (12/18/19 0915)  Peak Airway Pressure: 16 cmH2O (12/18/19 0915)  Plateau Pressure: 0 cmH20 (12/18/19 0915)  Total Ve: 5.34 mL (12/18/19 0915)  Negative Inspiratory Force (cm H2O): -50 (12/18/19 0915)  F/VT  Ratio<105 (RSBI): (!) 24.81 (12/18/19 0810)    Lines/Drains/Airways     Central Venous Catheter Line                 Introducer 12/16/19 0325 left internal jugular 2 days         Percutaneous Central Line Insertion/Assessment - double lumen  12/16/19 0325 2 days         Percutaneous Central Line Insertion/Assessment - triple lumen  12/16/19 0330 left internal jugular 2 days          Drain                 Urethral Catheter 12/16/19 0332 Temperature probe 14 Fr. 2 days         Y Chest Tube 1 and 2 12/16/19 1027 1 Right Mediastinal 19 Fr. 2 Left Mediastinal 19 Fr. 1 day         Y Chest Tube 3 and 4 12/16/19 1030 3 Right Pleural 19 Fr. 4 Left Pleural 19 Fr. 1 day          Arterial Line                 Arterial Line 12/16/19 0300 Left Other (Comment) 2 days          Line                 Pacer Wires 12/16/19 1004 2 days          Peripheral Intravenous Line                 Midline Catheter Insertion/Assessment  - Single Lumen 12/04/19 1552 Right cephalic vein (lateral side of arm) 18g x 8cm 13 days                Significant Labs:    CBC/Anemia Profile:  Recent Labs   Lab 12/17/19  2355 12/18/19  0333 12/18/19  0334 12/18/19  0754   WBC 23.11* 21.50*  --  21.97*   HGB 7.5* 7.4*  --  7.5*   HCT 24.0* 23.9* 24* 23.3*    173  --  180   MCV 87 87  --  87   RDW 17.1* 17.0*  --  17.2*        Chemistries:  Recent Labs   Lab 12/16/19  1234  12/17/19  0102 12/17/19  0459  12/17/19  2355 12/18/19  0333 12/18/19  0754      < > 138 137   < > 135* 136 134*   K 3.4*  3.4*   < > 4.4 5.4*   < > 5.6* 5.5* 5.4*      < > 102 101   < > 102 102 100   CO2 21*   < > 25 24   < > 24 23 22*   BUN 19   < > 26* 28*   < > 43* 46* 52*   CREATININE 1.7*   < > 2.2* 2.5*   < > 3.3* 3.2* 3.3*   CALCIUM 8.7   < > 9.3 9.1   < > 9.2 9.1 9.1   ALBUMIN  --    < >  --   --    < > 3.5 3.5 3.4*   PROT  --    < >  --   --    < > 5.7* 5.6* 5.6*   BILITOT  --    < >  --   --    < > 0.4 0.4 0.4   ALKPHOS  --    < >  --   --    < > 49* 50* 49*   ALT   --    < >  --   --    < > 105* 94* 74*   AST  --    < >  --   --    < > 305* 288* 239*   MG 3.1*   < > 3.1* 3.0*   < > 3.0* 3.0* 3.0*   PHOS 2.2*  --  3.2 4.5  --   --   --   --     < > = values in this interval not displayed.       All pertinent labs within the past 24 hours have been reviewed.    Significant Imaging:  I have reviewed and interpreted all pertinent imaging results/findings within the past 24 hours.

## 2019-12-18 NOTE — ASSESSMENT & PLAN NOTE
- Baseline creat appears 1.3-1.6.   - CVP 17, given one dose of IV push lasix overnight, would recommend diuresis

## 2019-12-18 NOTE — PROGRESS NOTES
[facility]  Critical Care - Surgery  Progress Note    Patient Name: Deborah Navas  MRN: [id number]  Admission Date: 11/13/2019  Hospital Length of Stay: 33 days  Code Status: Prior  Attending Provider: Talha Nuñez MD  Primary Care Provider: Primary Doctor No   Principal Problem: Status post heart transplant    Subjective:     Hospital/ICU Course:  12/16 - Cardiac transplant, Arrived to SICU vaso, epi, and Levo. Intubated goal to extubate tomorrow  12/18: NAEON.  Will attempt extubation later this morning.    Interval History/Significant Events:   NAEON.  Pt doing well, weaning sedation and will attempt extubation later this morning.      Follow-up For: Procedure(s) (LRB):  TRANSPLANT, HEART (N/A)    Post-Operative Day: 1 Day Post-Op    Objective:     Vital Signs (Most Recent):  Temp: 98.6 °F (37 °C) (12/18/19 0945)  Pulse: 80 (12/18/19 0945)  Resp: 20 (12/18/19 0945)  BP: 106/62 (12/18/19 0900)  SpO2: 96 % (12/18/19 0945) Vital Signs (24h Range):  Temp:  [87.8 °F (31 °C)-100.4 °F (38 °C)] 98.6 °F (37 °C)  Pulse:  [] 80  Resp:  [9-44] 20  SpO2:  [94 %-100 %] 96 %  BP: ()/(52-74) 106/62  Arterial Line BP: ()/(44-75) 94/46     Weight: 100.1 kg (220 lb 10.9 oz)  Body mass index is 35.62 kg/m².      Intake/Output Summary (Last 24 hours) at 12/18/2019 1022  Last data filed at 12/18/2019 1000  Gross per 24 hour   Intake 1993.67 ml   Output 1152 ml   Net 841.67 ml       Physical Exam     Constitutional: She appears well-developed and well-nourished. No distress.   HENT:   Head: Normocephalic and atraumatic.   Eyes: Conjunctivae are normal.   Neck: Neck supple.   Cardiovascular:   Atrial paced at 110.    Pulmonary/Chest: Effort normal.   Chest tubes intact. Serosanguinous drainage. Pacing wires in place.    Abdominal: Soft. There is no tenderness.   Musculoskeletal: She exhibits no deformity.   Right femoral access site covered with dressing. No issues.    Neurological:  She is alert.   Skin: Skin is warm and dry.   Psychiatric:   Unable to assess.    Nursing note and vitals reviewed.       Vents:  Vent Mode: Spont (12/18/19 0915)  Set Rate: 20 bmp (12/18/19 0756)  Vt Set: 400 mL (12/18/19 0756)  Pressure Support: 10 cmH20 (12/18/19 0915)  PEEP/CPAP: 5 cmH20 (12/18/19 0915)  Oxygen Concentration (%): 32 (12/18/19 0915)  Peak Airway Pressure: 16 cmH2O (12/18/19 0915)  Plateau Pressure: 0 cmH20 (12/18/19 0915)  Total Ve: 5.34 mL (12/18/19 0915)  Negative Inspiratory Force (cm H2O): -50 (12/18/19 0915)  F/VT Ratio<105 (RSBI): (!) 24.81 (12/18/19 0810)    Lines/Drains/Airways     Central Venous Catheter Line                 Introducer 12/16/19 0325 left internal jugular 2 days         Percutaneous Central Line Insertion/Assessment - double lumen  12/16/19 0325 2 days         Percutaneous Central Line Insertion/Assessment - triple lumen  12/16/19 0330 left internal jugular 2 days          Drain                 Urethral Catheter 12/16/19 0332 Temperature probe 14 Fr. 2 days         Y Chest Tube 1 and 2 12/16/19 1027 1 Right Mediastinal 19 Fr. 2 Left Mediastinal 19 Fr. 1 day         Y Chest Tube 3 and 4 12/16/19 1030 3 Right Pleural 19 Fr. 4 Left Pleural 19 Fr. 1 day          Arterial Line                 Arterial Line 12/16/19 0300 Left Other (Comment) 2 days          Line                 Pacer Wires 12/16/19 1004 2 days          Peripheral Intravenous Line                 Midline Catheter Insertion/Assessment  - Single Lumen 12/04/19 1552 Right cephalic vein (lateral side of arm) 18g x 8cm 13 days                Significant Labs:    CBC/Anemia Profile:  Recent Labs   Lab 12/17/19  2355 12/18/19  0333 12/18/19  0334 12/18/19  0754   WBC 23.11* 21.50*  --  21.97*   HGB 7.5* 7.4*  --  7.5*   HCT 24.0* 23.9* 24* 23.3*    173  --  180   MCV 87 87  --  87   RDW 17.1* 17.0*  --  17.2*        Chemistries:  Recent Labs   Lab 12/16/19  1234  12/17/19  0102 12/17/19  0297  12/17/19  3978  12/18/19  0333 12/18/19  0754      < > 138 137   < > 135* 136 134*   K 3.4*  3.4*   < > 4.4 5.4*   < > 5.6* 5.5* 5.4*      < > 102 101   < > 102 102 100   CO2 21*   < > 25 24   < > 24 23 22*   BUN 19   < > 26* 28*   < > 43* 46* 52*   CREATININE 1.7*   < > 2.2* 2.5*   < > 3.3* 3.2* 3.3*   CALCIUM 8.7   < > 9.3 9.1   < > 9.2 9.1 9.1   ALBUMIN  --    < >  --   --    < > 3.5 3.5 3.4*   PROT  --    < >  --   --    < > 5.7* 5.6* 5.6*   BILITOT  --    < >  --   --    < > 0.4 0.4 0.4   ALKPHOS  --    < >  --   --    < > 49* 50* 49*   ALT  --    < >  --   --    < > 105* 94* 74*   AST  --    < >  --   --    < > 305* 288* 239*   MG 3.1*   < > 3.1* 3.0*   < > 3.0* 3.0* 3.0*   PHOS 2.2*  --  3.2 4.5  --   --   --   --     < > = values in this interval not displayed.       All pertinent labs within the past 24 hours have been reviewed.    Significant Imaging:  I have reviewed and interpreted all pertinent imaging results/findings within the past 24 hours.    Assessment/Plan:     Heart transplant candidate  49-year-old female with NICM s/p LVAD September 2019 now s/p heart transplant 12/16/19. Arrived intubated on levo, vaso, epi, and NO. Doing well.     Neuro:   -Fentanyl for pain  -Wean sedation   -full neuro exam     Cards:  -monitor hemodynamics  -monitor chest tube output, high initially but slowing down   - Left chest tube: 168cc   - Right chest tube: 830cc  -wean vaso, levo, and NO as tolerated  -MAP goal 60-80    Resp:  -wean to extubate today  -Minimal vent settings  -ABG as needed    Renal:  -monitor urine output  -trend BUN and creatinine  -BUN/Cr 26/2.5    Heme:  -trend H/H   - Received 1uRBC 12/17/19  -monitor chest tube output    FENGI:  -NPO for now  -replace lytes as needed  -GI ppx    Endo:  -monitor POC glucose  -insulin gtt for time being    ID:  -monitor WBC  -perioperative abx    Dispo: continue ICU care. Extubate today                 Critical care was time spent personally by me on the following  activities: development of treatment plan with patient or surrogate and bedside caregivers, discussions with consultants, evaluation of patient's response to treatment, examination of patient, ordering and performing treatments and interventions, ordering and review of laboratory studies, ordering and review of radiographic studies, pulse oximetry, re-evaluation of patient's condition.  This critical care time did not overlap with that of any other provider or involve time for any procedures.     Oscar Wright,   Critical Care - Surgery  [facility]

## 2019-12-18 NOTE — PROGRESS NOTES
"[facility]  Endocrinology  Progress Note    Admit Date: 11/13/2019     Reason for Consult: Management of Post transplant Hyperglycemia     Surgical Procedure and Date:   Heart Transplant 12/16/2019      HPI:   Patient is a 49 y.o. female with a diagnosis of NICM, s/p HM3 implantation 9/10/19, V-fib reported in setting of hypokalemia with appropriate shock from ICD on Amiodarone prior to LVAD placement. Patient is now s/p heart transplant. Endocrinology consulted to manage hyperglycemia s/p heart surgery.     Lab Results   Component Value Date    HGBA1C 6.5 (H) 09/06/2019             Interval HPI:   Overnight events:  BG is within or below goal on intensive IV insulin infusion. Patient has not required insulin therapy for glycemic control, and is tolerating steroid therapy. No dx of dm noted.   No diet orders on file  Diet clear liquid    Eating:   <25%  Nausea: No  Hypoglycemia and intervention: No  Fever: No  TPN and/or TF: No  If yes, type of TF/TPN and rate: None    /69 (BP Location: Right arm, Patient Position: Lying)   Pulse (!) 111   Temp 99.1 °F (37.3 °C)   Resp (!) 9   Ht 5' 6" (1.676 m)   Wt 100.1 kg (220 lb 10.9 oz)   LMP  (LMP Unknown)   SpO2 98%   Breastfeeding? No   BMI 35.62 kg/m²      Labs Reviewed and Include    Recent Labs   Lab 12/18/19  0754   *   CALCIUM 9.1   ALBUMIN 3.4*   PROT 5.6*   *   K 5.4*   CO2 22*      BUN 52*   CREATININE 3.3*   ALKPHOS 49*   ALT 74*   *   BILITOT 0.4     Lab Results   Component Value Date    WBC 21.97 (H) 12/18/2019    HGB 7.5 (L) 12/18/2019    HCT 23.3 (L) 12/18/2019    MCV 87 12/18/2019     12/18/2019     No results for input(s): TSH, FREET4 in the last 168 hours.  Lab Results   Component Value Date    HGBA1C 6.5 (H) 09/06/2019       Nutritional status:   Body mass index is 35.62 kg/m².  Lab Results   Component Value Date    ALBUMIN 3.4 (L) 12/18/2019    ALBUMIN 3.5 12/18/2019    ALBUMIN 3.5 " 12/17/2019     Lab Results   Component Value Date    PREALBUMIN 27 12/16/2019    PREALBUMIN 28 12/13/2019    PREALBUMIN 29 12/11/2019       Estimated Creatinine Clearance: 24.3 mL/min (A) (based on SCr of 3.3 mg/dL (H)).    Accu-Checks  Recent Labs     12/17/19  2138 12/17/19  2202 12/17/19  2353 12/18/19  0111 12/18/19  0202 12/18/19  0331 12/18/19  0521 12/18/19  0618 12/18/19  0753 12/18/19  0859   POCTGLUCOSE 225* 172* 141* 141* 143* 134* 132* 136* 128* 129*       Current Medications and/or Treatments Impacting Glycemic Control  Immunotherapy:    Immunosuppressants         Stop Route Frequency     mycophenolate (CELLCEPT) 1,000 mg in dextrose 5 % 100 mL IVPB      -- IV Every 12 hours        Steroids:   Hormones (From admission, onward)    Start     Stop Route Frequency Ordered    12/17/19 2000  methylPREDNISolone sodium succinate injection 40 mg      -- IV Every 12 hours 12/17/19 0917    12/16/19 1315  vasopressin (PITRESSIN) 0.2 Units/mL in dextrose 5 % 100 mL infusion      -- IV Continuous 12/16/19 1214        Pressors:    Autonomic Drugs (From admission, onward)    Start     Stop Route Frequency Ordered    12/16/19 1315  EPINEPHrine (ADRENALIN) 5 mg in sodium chloride 0.9% 250 mL infusion     Question Answer Comment   Titrate by: (in mcg/kg/min) 0.01    Titrate interval: (in minutes) 5    Titrate to maintain: (SBP or MAP or Cardiac Index) SBP    Maximum dose: (in mcg/kg/min) 2        -- IV Continuous 12/16/19 1214    12/16/19 1315  norepinephrine 4 mg in dextrose 5% 250 mL infusion (premix) (titrating)     Question Answer Comment   Titrate by: (in mcg/kg/min) 0.05    Titrate interval: (in minutes) 5    Titrate to maintain: (MAP or SBP) MAP    Greater than: (in mmHg) 65    Maximum dose: (in mcg/kg/min) 3        -- IV Continuous 12/16/19 1214        Hyperglycemia/Diabetes Medications:   Antihyperglycemics (From admission, onward)    Start     Stop Route Frequency Ordered    12/16/19 1315  insulin regular  (Humulin R) 100 Units in sodium chloride 0.9% 100 mL infusion     Question:  Insulin rate changes (DO NOT MODIFY ANSWER)  Answer:  [email or web address] [email or web address] [id number]    -- IV Continuous 12/16/19 1214          ASSESSMENT and PLAN    * Status post heart transplant  Managed per primary team  Avoid hypoglycemia        Stress hyperglycemia  BG goal 140 - 180     Discontinue IV insulin infusion protocol. BG is within or below goal without use of insulin therapy.   Low Dose SQ Insulin Correction Scale PRN BG excursions.   BG monitoring every 4 hours while NPO.    ADDENDUM:   notified per nursing staff that diet will be advancing.   BG Monitoring AC/HS   Low Dose SQ Insulin Correction Scale PRN BG excursions.     ** Please call Endocrine for any BG related issues **  ** Please notify Endocrine for any change and/or advance in diet**      Discharge planning: TBD        Adverse effect of adrenal cortical steroids, sequela  On steroid therapy per transplant team; may elevate BG readings        CKD (chronic kidney disease)  Titrate insulin slowly to avoid hypoglycemia as the risk of hypoglycemia increases with decreased creatinine clearance.            Sonny Boland, NP  Endocrinology  [facility]

## 2019-12-19 PROBLEM — Z95.810 ICD (IMPLANTABLE CARDIOVERTER-DEFIBRILLATOR) IN PLACE: Status: RESOLVED | Noted: 2018-07-20 | Resolved: 2019-12-19

## 2019-12-19 PROBLEM — T82.9XXA LEFT VENTRICULAR ASSIST DEVICE (LVAD) COMPLICATION: Status: RESOLVED | Noted: 2019-11-13 | Resolved: 2019-12-19

## 2019-12-19 PROBLEM — I50.43 ACUTE ON CHRONIC COMBINED SYSTOLIC AND DIASTOLIC HEART FAILURE: Status: RESOLVED | Noted: 2019-09-04 | Resolved: 2019-12-19

## 2019-12-19 PROBLEM — Z95.811 LVAD (LEFT VENTRICULAR ASSIST DEVICE) PRESENT: Status: RESOLVED | Noted: 2019-09-10 | Resolved: 2019-12-19

## 2019-12-19 LAB
ABO + RH BLD: NORMAL
ALBUMIN SERPL BCP-MCNC: 3.2 G/DL (ref 3.5–5.2)
ALBUMIN SERPL BCP-MCNC: 3.3 G/DL (ref 3.5–5.2)
ALBUMIN SERPL BCP-MCNC: 3.4 G/DL (ref 3.5–5.2)
ALLENS TEST: ABNORMAL
ALP SERPL-CCNC: 56 U/L (ref 55–135)
ALP SERPL-CCNC: 58 U/L (ref 55–135)
ALP SERPL-CCNC: 62 U/L (ref 55–135)
ALP SERPL-CCNC: 63 U/L (ref 55–135)
ALP SERPL-CCNC: 65 U/L (ref 55–135)
ALP SERPL-CCNC: 67 U/L (ref 55–135)
ALT SERPL W/O P-5'-P-CCNC: 30 U/L (ref 10–44)
ALT SERPL W/O P-5'-P-CCNC: 32 U/L (ref 10–44)
ALT SERPL W/O P-5'-P-CCNC: 32 U/L (ref 10–44)
ALT SERPL W/O P-5'-P-CCNC: 34 U/L (ref 10–44)
ALT SERPL W/O P-5'-P-CCNC: 37 U/L (ref 10–44)
ALT SERPL W/O P-5'-P-CCNC: 38 U/L (ref 10–44)
ANION GAP SERPL CALC-SCNC: 12 MMOL/L (ref 8–16)
ANION GAP SERPL CALC-SCNC: 13 MMOL/L (ref 8–16)
ANION GAP SERPL CALC-SCNC: 13 MMOL/L (ref 8–16)
ANION GAP SERPL CALC-SCNC: 14 MMOL/L (ref 8–16)
ANISOCYTOSIS BLD QL SMEAR: SLIGHT
AST SERPL-CCNC: 103 U/L (ref 10–40)
AST SERPL-CCNC: 113 U/L (ref 10–40)
AST SERPL-CCNC: 114 U/L (ref 10–40)
AST SERPL-CCNC: 130 U/L (ref 10–40)
AST SERPL-CCNC: 145 U/L (ref 10–40)
AST SERPL-CCNC: 97 U/L (ref 10–40)
BASO STIPL BLD QL SMEAR: ABNORMAL
BASOPHILS # BLD AUTO: 0 K/UL (ref 0–0.2)
BASOPHILS # BLD AUTO: 0.01 K/UL (ref 0–0.2)
BASOPHILS NFR BLD: 0 % (ref 0–1.9)
BASOPHILS NFR BLD: 0.1 % (ref 0–1.9)
BILIRUB SERPL-MCNC: 0.6 MG/DL (ref 0.1–1)
BILIRUB SERPL-MCNC: 0.7 MG/DL (ref 0.1–1)
BILIRUB SERPL-MCNC: 0.8 MG/DL (ref 0.1–1)
BLD GP AB SCN CELLS X3 SERPL QL: NORMAL
BUN SERPL-MCNC: 60 MG/DL (ref 6–20)
BUN SERPL-MCNC: 63 MG/DL (ref 6–20)
BUN SERPL-MCNC: 63 MG/DL (ref 6–20)
BUN SERPL-MCNC: 64 MG/DL (ref 6–20)
BURR CELLS BLD QL SMEAR: ABNORMAL
CALCIUM SERPL-MCNC: 8.5 MG/DL (ref 8.7–10.5)
CALCIUM SERPL-MCNC: 8.6 MG/DL (ref 8.7–10.5)
CALCIUM SERPL-MCNC: 8.7 MG/DL (ref 8.7–10.5)
CALCIUM SERPL-MCNC: 8.9 MG/DL (ref 8.7–10.5)
CALCIUM SERPL-MCNC: 9 MG/DL (ref 8.7–10.5)
CALCIUM SERPL-MCNC: 9.1 MG/DL (ref 8.7–10.5)
CHLORIDE SERPL-SCNC: 101 MMOL/L (ref 95–110)
CHLORIDE SERPL-SCNC: 102 MMOL/L (ref 95–110)
CHLORIDE SERPL-SCNC: 99 MMOL/L (ref 95–110)
CO2 SERPL-SCNC: 23 MMOL/L (ref 23–29)
CO2 SERPL-SCNC: 23 MMOL/L (ref 23–29)
CO2 SERPL-SCNC: 24 MMOL/L (ref 23–29)
CO2 SERPL-SCNC: 25 MMOL/L (ref 23–29)
CO2 SERPL-SCNC: 25 MMOL/L (ref 23–29)
CO2 SERPL-SCNC: 26 MMOL/L (ref 23–29)
CREAT SERPL-MCNC: 2.6 MG/DL (ref 0.5–1.4)
CREAT SERPL-MCNC: 2.9 MG/DL (ref 0.5–1.4)
CREAT SERPL-MCNC: 2.9 MG/DL (ref 0.5–1.4)
CREAT SERPL-MCNC: 3 MG/DL (ref 0.5–1.4)
CREAT SERPL-MCNC: 3.2 MG/DL (ref 0.5–1.4)
CREAT SERPL-MCNC: 3.3 MG/DL (ref 0.5–1.4)
DACRYOCYTES BLD QL SMEAR: ABNORMAL
DELSYS: ABNORMAL
DIFFERENTIAL METHOD: ABNORMAL
DOHLE BOD BLD QL SMEAR: PRESENT
EOSINOPHIL # BLD AUTO: 0 K/UL (ref 0–0.5)
EOSINOPHIL NFR BLD: 0 % (ref 0–8)
ERYTHROCYTE [DISTWIDTH] IN BLOOD BY AUTOMATED COUNT: 16.7 % (ref 11.5–14.5)
ERYTHROCYTE [DISTWIDTH] IN BLOOD BY AUTOMATED COUNT: 16.7 % (ref 11.5–14.5)
ERYTHROCYTE [DISTWIDTH] IN BLOOD BY AUTOMATED COUNT: 16.8 % (ref 11.5–14.5)
ERYTHROCYTE [DISTWIDTH] IN BLOOD BY AUTOMATED COUNT: 16.8 % (ref 11.5–14.5)
ERYTHROCYTE [DISTWIDTH] IN BLOOD BY AUTOMATED COUNT: 17 % (ref 11.5–14.5)
ERYTHROCYTE [DISTWIDTH] IN BLOOD BY AUTOMATED COUNT: 17.1 % (ref 11.5–14.5)
ERYTHROCYTE [SEDIMENTATION RATE] IN BLOOD BY WESTERGREN METHOD: 16 MM/H
ERYTHROCYTE [SEDIMENTATION RATE] IN BLOOD BY WESTERGREN METHOD: 18 MM/H
EST. GFR  (AFRICAN AMERICAN): 17.9 ML/MIN/1.73 M^2
EST. GFR  (AFRICAN AMERICAN): 18.6 ML/MIN/1.73 M^2
EST. GFR  (AFRICAN AMERICAN): 20.1 ML/MIN/1.73 M^2
EST. GFR  (AFRICAN AMERICAN): 21 ML/MIN/1.73 M^2
EST. GFR  (AFRICAN AMERICAN): 21 ML/MIN/1.73 M^2
EST. GFR  (AFRICAN AMERICAN): 23.9 ML/MIN/1.73 M^2
EST. GFR  (NON AFRICAN AMERICAN): 15.5 ML/MIN/1.73 M^2
EST. GFR  (NON AFRICAN AMERICAN): 16.1 ML/MIN/1.73 M^2
EST. GFR  (NON AFRICAN AMERICAN): 17.4 ML/MIN/1.73 M^2
EST. GFR  (NON AFRICAN AMERICAN): 18.2 ML/MIN/1.73 M^2
EST. GFR  (NON AFRICAN AMERICAN): 18.2 ML/MIN/1.73 M^2
EST. GFR  (NON AFRICAN AMERICAN): 20.7 ML/MIN/1.73 M^2
FIO2: 28
FIO2: 28
FLOW: 2
GIANT PLATELETS BLD QL SMEAR: PRESENT
GIANT PLATELETS BLD QL SMEAR: PRESENT
GLUCOSE SERPL-MCNC: 157 MG/DL (ref 70–110)
GLUCOSE SERPL-MCNC: 159 MG/DL (ref 70–110)
GLUCOSE SERPL-MCNC: 161 MG/DL (ref 70–110)
GLUCOSE SERPL-MCNC: 173 MG/DL (ref 70–110)
GLUCOSE SERPL-MCNC: 195 MG/DL (ref 70–110)
GLUCOSE SERPL-MCNC: 225 MG/DL (ref 70–110)
HCO3 UR-SCNC: 23.6 MMOL/L (ref 24–28)
HCO3 UR-SCNC: 25 MMOL/L (ref 24–28)
HCO3 UR-SCNC: 28.8 MMOL/L (ref 24–28)
HCT VFR BLD AUTO: 24.7 % (ref 37–48.5)
HCT VFR BLD AUTO: 24.8 % (ref 37–48.5)
HCT VFR BLD AUTO: 24.8 % (ref 37–48.5)
HCT VFR BLD AUTO: 25.5 % (ref 37–48.5)
HCT VFR BLD AUTO: 26 % (ref 37–48.5)
HCT VFR BLD AUTO: 26.4 % (ref 37–48.5)
HGB BLD-MCNC: 7.8 G/DL (ref 12–16)
HGB BLD-MCNC: 7.8 G/DL (ref 12–16)
HGB BLD-MCNC: 7.9 G/DL (ref 12–16)
HGB BLD-MCNC: 8 G/DL (ref 12–16)
HGB BLD-MCNC: 8 G/DL (ref 12–16)
HGB BLD-MCNC: 8.2 G/DL (ref 12–16)
HYPOCHROMIA BLD QL SMEAR: ABNORMAL
IMM GRANULOCYTES # BLD AUTO: 0.02 K/UL (ref 0–0.04)
IMM GRANULOCYTES # BLD AUTO: 0.03 K/UL (ref 0–0.04)
IMM GRANULOCYTES # BLD AUTO: 0.08 K/UL (ref 0–0.04)
IMM GRANULOCYTES # BLD AUTO: 0.1 K/UL (ref 0–0.04)
IMM GRANULOCYTES # BLD AUTO: 0.1 K/UL (ref 0–0.04)
IMM GRANULOCYTES # BLD AUTO: 0.12 K/UL (ref 0–0.04)
IMM GRANULOCYTES NFR BLD AUTO: 0.3 % (ref 0–0.5)
IMM GRANULOCYTES NFR BLD AUTO: 0.4 % (ref 0–0.5)
IMM GRANULOCYTES NFR BLD AUTO: 0.5 % (ref 0–0.5)
IMM GRANULOCYTES NFR BLD AUTO: 0.7 % (ref 0–0.5)
IMM GRANULOCYTES NFR BLD AUTO: 0.7 % (ref 0–0.5)
IMM GRANULOCYTES NFR BLD AUTO: 0.8 % (ref 0–0.5)
INR PPP: 1.4 (ref 0.8–1.2)
LYMPHOCYTES # BLD AUTO: 0.1 K/UL (ref 1–4.8)
LYMPHOCYTES # BLD AUTO: 0.2 K/UL (ref 1–4.8)
LYMPHOCYTES NFR BLD: 0.7 % (ref 18–48)
LYMPHOCYTES NFR BLD: 0.9 % (ref 18–48)
LYMPHOCYTES NFR BLD: 1.2 % (ref 18–48)
LYMPHOCYTES NFR BLD: 1.5 % (ref 18–48)
LYMPHOCYTES NFR BLD: 1.7 % (ref 18–48)
LYMPHOCYTES NFR BLD: 2 % (ref 18–48)
MAGNESIUM SERPL-MCNC: 2.5 MG/DL (ref 1.6–2.6)
MAGNESIUM SERPL-MCNC: 2.5 MG/DL (ref 1.6–2.6)
MAGNESIUM SERPL-MCNC: 2.6 MG/DL (ref 1.6–2.6)
MAGNESIUM SERPL-MCNC: 2.6 MG/DL (ref 1.6–2.6)
MAGNESIUM SERPL-MCNC: 2.7 MG/DL (ref 1.6–2.6)
MAGNESIUM SERPL-MCNC: 2.8 MG/DL (ref 1.6–2.6)
MCH RBC QN AUTO: 27.1 PG (ref 27–31)
MCH RBC QN AUTO: 27.3 PG (ref 27–31)
MCH RBC QN AUTO: 27.5 PG (ref 27–31)
MCH RBC QN AUTO: 27.6 PG (ref 27–31)
MCHC RBC AUTO-ENTMCNC: 30.8 G/DL (ref 32–36)
MCHC RBC AUTO-ENTMCNC: 31.1 G/DL (ref 32–36)
MCHC RBC AUTO-ENTMCNC: 31.4 G/DL (ref 32–36)
MCHC RBC AUTO-ENTMCNC: 31.5 G/DL (ref 32–36)
MCHC RBC AUTO-ENTMCNC: 31.5 G/DL (ref 32–36)
MCHC RBC AUTO-ENTMCNC: 32 G/DL (ref 32–36)
MCV RBC AUTO: 86 FL (ref 82–98)
MCV RBC AUTO: 87 FL (ref 82–98)
MCV RBC AUTO: 88 FL (ref 82–98)
MCV RBC AUTO: 89 FL (ref 82–98)
MODE: ABNORMAL
MONOCYTES # BLD AUTO: 0.1 K/UL (ref 0.3–1)
MONOCYTES # BLD AUTO: 0.2 K/UL (ref 0.3–1)
MONOCYTES # BLD AUTO: 0.2 K/UL (ref 0.3–1)
MONOCYTES # BLD AUTO: 0.3 K/UL (ref 0.3–1)
MONOCYTES NFR BLD: 0.9 % (ref 4–15)
MONOCYTES NFR BLD: 1.5 % (ref 4–15)
MONOCYTES NFR BLD: 2 % (ref 4–15)
MONOCYTES NFR BLD: 2 % (ref 4–15)
MONOCYTES NFR BLD: 2.2 % (ref 4–15)
MONOCYTES NFR BLD: 2.3 % (ref 4–15)
NEUTROPHILS # BLD AUTO: 13.8 K/UL (ref 1.8–7.7)
NEUTROPHILS # BLD AUTO: 14.3 K/UL (ref 1.8–7.7)
NEUTROPHILS # BLD AUTO: 14.8 K/UL (ref 1.8–7.7)
NEUTROPHILS # BLD AUTO: 15.1 K/UL (ref 1.8–7.7)
NEUTROPHILS # BLD AUTO: 7.1 K/UL (ref 1.8–7.7)
NEUTROPHILS # BLD AUTO: 7.6 K/UL (ref 1.8–7.7)
NEUTROPHILS NFR BLD: 95.7 % (ref 38–73)
NEUTROPHILS NFR BLD: 96.2 % (ref 38–73)
NEUTROPHILS NFR BLD: 96.3 % (ref 38–73)
NEUTROPHILS NFR BLD: 96.6 % (ref 38–73)
NRBC BLD-RTO: 2 /100 WBC
OVALOCYTES BLD QL SMEAR: ABNORMAL
PCO2 BLDA: 36.2 MMHG (ref 35–45)
PCO2 BLDA: 39.7 MMHG (ref 35–45)
PCO2 BLDA: 40.6 MMHG (ref 35–45)
PH SMN: 7.41 [PH] (ref 7.35–7.45)
PH SMN: 7.42 [PH] (ref 7.35–7.45)
PH SMN: 7.46 [PH] (ref 7.35–7.45)
PLATELET # BLD AUTO: 109 K/UL (ref 150–350)
PLATELET # BLD AUTO: 115 K/UL (ref 150–350)
PLATELET # BLD AUTO: 116 K/UL (ref 150–350)
PLATELET # BLD AUTO: 117 K/UL (ref 150–350)
PLATELET # BLD AUTO: 132 K/UL (ref 150–350)
PLATELET # BLD AUTO: 135 K/UL (ref 150–350)
PLATELET BLD QL SMEAR: ABNORMAL
PMV BLD AUTO: 10.1 FL (ref 9.2–12.9)
PMV BLD AUTO: 10.2 FL (ref 9.2–12.9)
PMV BLD AUTO: 10.4 FL (ref 9.2–12.9)
PMV BLD AUTO: 10.5 FL (ref 9.2–12.9)
PMV BLD AUTO: 10.6 FL (ref 9.2–12.9)
PMV BLD AUTO: 10.6 FL (ref 9.2–12.9)
PO2 BLDA: 29 MMHG (ref 40–60)
PO2 BLDA: 32 MMHG (ref 40–60)
PO2 BLDA: 34 MMHG (ref 40–60)
POC BE: -1 MMOL/L
POC BE: 0 MMOL/L
POC BE: 5 MMOL/L
POC SATURATED O2: 56 % (ref 95–100)
POC SATURATED O2: 66 % (ref 95–100)
POC SATURATED O2: 68 % (ref 95–100)
POC TCO2: 25 MMOL/L (ref 24–29)
POC TCO2: 26 MMOL/L (ref 24–29)
POC TCO2: 30 MMOL/L (ref 24–29)
POCT GLUCOSE: 160 MG/DL (ref 70–110)
POCT GLUCOSE: 195 MG/DL (ref 70–110)
POCT GLUCOSE: 201 MG/DL (ref 70–110)
POIKILOCYTOSIS BLD QL SMEAR: SLIGHT
POLYCHROMASIA BLD QL SMEAR: ABNORMAL
POTASSIUM SERPL-SCNC: 3.4 MMOL/L (ref 3.5–5.1)
POTASSIUM SERPL-SCNC: 3.7 MMOL/L (ref 3.5–5.1)
POTASSIUM SERPL-SCNC: 4 MMOL/L (ref 3.5–5.1)
POTASSIUM SERPL-SCNC: 4 MMOL/L (ref 3.5–5.1)
PROT SERPL-MCNC: 5.6 G/DL (ref 6–8.4)
PROT SERPL-MCNC: 5.8 G/DL (ref 6–8.4)
PROT SERPL-MCNC: 5.9 G/DL (ref 6–8.4)
PROT SERPL-MCNC: 6.1 G/DL (ref 6–8.4)
PROTHROMBIN TIME: 14 SEC (ref 9–12.5)
RBC # BLD AUTO: 2.83 M/UL (ref 4–5.4)
RBC # BLD AUTO: 2.86 M/UL (ref 4–5.4)
RBC # BLD AUTO: 2.87 M/UL (ref 4–5.4)
RBC # BLD AUTO: 2.91 M/UL (ref 4–5.4)
RBC # BLD AUTO: 2.95 M/UL (ref 4–5.4)
RBC # BLD AUTO: 2.98 M/UL (ref 4–5.4)
SAMPLE: ABNORMAL
SCHISTOCYTES BLD QL SMEAR: ABNORMAL
SCHISTOCYTES BLD QL SMEAR: PRESENT
SITE: ABNORMAL
SODIUM SERPL-SCNC: 137 MMOL/L (ref 136–145)
SODIUM SERPL-SCNC: 137 MMOL/L (ref 136–145)
SODIUM SERPL-SCNC: 138 MMOL/L (ref 136–145)
SODIUM SERPL-SCNC: 139 MMOL/L (ref 136–145)
SODIUM SERPL-SCNC: 140 MMOL/L (ref 136–145)
SODIUM SERPL-SCNC: 140 MMOL/L (ref 136–145)
SP02: 94
SP02: 97
SP02: 99
TOXIC GRANULES BLD QL SMEAR: PRESENT
WBC # BLD AUTO: 14.28 K/UL (ref 3.9–12.7)
WBC # BLD AUTO: 14.81 K/UL (ref 3.9–12.7)
WBC # BLD AUTO: 15.33 K/UL (ref 3.9–12.7)
WBC # BLD AUTO: 15.71 K/UL (ref 3.9–12.7)
WBC # BLD AUTO: 7.46 K/UL (ref 3.9–12.7)
WBC # BLD AUTO: 7.87 K/UL (ref 3.9–12.7)
WBC TOXIC VACUOLES BLD QL SMEAR: PRESENT
WBC TOXIC VACUOLES BLD QL SMEAR: PRESENT

## 2019-12-19 PROCEDURE — C9113 INJ PANTOPRAZOLE SODIUM, VIA: HCPCS | Performed by: SURGERY

## 2019-12-19 PROCEDURE — 86359 T CELLS TOTAL COUNT: CPT

## 2019-12-19 PROCEDURE — 63600175 PHARM REV CODE 636 W HCPCS: Performed by: SURGERY

## 2019-12-19 PROCEDURE — 20000000 HC ICU ROOM

## 2019-12-19 PROCEDURE — 99292 PR CRITICAL CARE, ADDL 30 MIN: ICD-10-PCS | Mod: ,,, | Performed by: INTERNAL MEDICINE

## 2019-12-19 PROCEDURE — 97116 GAIT TRAINING THERAPY: CPT

## 2019-12-19 PROCEDURE — 97168 OT RE-EVAL EST PLAN CARE: CPT

## 2019-12-19 PROCEDURE — 99291 PR CRITICAL CARE, E/M 30-74 MINUTES: ICD-10-PCS | Mod: ,,, | Performed by: PHYSICIAN ASSISTANT

## 2019-12-19 PROCEDURE — 85025 COMPLETE CBC W/AUTO DIFF WBC: CPT

## 2019-12-19 PROCEDURE — 63600175 PHARM REV CODE 636 W HCPCS: Performed by: INTERNAL MEDICINE

## 2019-12-19 PROCEDURE — 80053 COMPREHEN METABOLIC PANEL: CPT

## 2019-12-19 PROCEDURE — 99291 CRITICAL CARE FIRST HOUR: CPT | Mod: ,,, | Performed by: PHYSICIAN ASSISTANT

## 2019-12-19 PROCEDURE — 83735 ASSAY OF MAGNESIUM: CPT | Mod: 91

## 2019-12-19 PROCEDURE — 80053 COMPREHEN METABOLIC PANEL: CPT | Mod: 91

## 2019-12-19 PROCEDURE — 25000003 PHARM REV CODE 250: Performed by: PHYSICIAN ASSISTANT

## 2019-12-19 PROCEDURE — 25000003 PHARM REV CODE 250: Performed by: INTERNAL MEDICINE

## 2019-12-19 PROCEDURE — 99900035 HC TECH TIME PER 15 MIN (STAT)

## 2019-12-19 PROCEDURE — 25000003 PHARM REV CODE 250: Performed by: STUDENT IN AN ORGANIZED HEALTH CARE EDUCATION/TRAINING PROGRAM

## 2019-12-19 PROCEDURE — 94761 N-INVAS EAR/PLS OXIMETRY MLT: CPT

## 2019-12-19 PROCEDURE — 97164 PT RE-EVAL EST PLAN CARE: CPT

## 2019-12-19 PROCEDURE — 63600175 PHARM REV CODE 636 W HCPCS: Performed by: NURSE PRACTITIONER

## 2019-12-19 PROCEDURE — 63600175 PHARM REV CODE 636 W HCPCS: Performed by: PHYSICIAN ASSISTANT

## 2019-12-19 PROCEDURE — 27000221 HC OXYGEN, UP TO 24 HOURS

## 2019-12-19 PROCEDURE — 85610 PROTHROMBIN TIME: CPT

## 2019-12-19 PROCEDURE — 97110 THERAPEUTIC EXERCISES: CPT

## 2019-12-19 PROCEDURE — 99292 CRITICAL CARE ADDL 30 MIN: CPT | Mod: ,,, | Performed by: INTERNAL MEDICINE

## 2019-12-19 PROCEDURE — 82803 BLOOD GASES ANY COMBINATION: CPT

## 2019-12-19 PROCEDURE — 25000003 PHARM REV CODE 250: Performed by: SURGERY

## 2019-12-19 RX ORDER — DIPHENHYDRAMINE HCL 25 MG
25 CAPSULE ORAL ONCE
Status: COMPLETED | OUTPATIENT
Start: 2019-12-19 | End: 2019-12-19

## 2019-12-19 RX ORDER — NICARDIPINE HYDROCHLORIDE 0.2 MG/ML
5 INJECTION INTRAVENOUS CONTINUOUS
Status: DISCONTINUED | OUTPATIENT
Start: 2019-12-19 | End: 2019-12-20

## 2019-12-19 RX ORDER — MIRTAZAPINE 15 MG/1
15 TABLET, FILM COATED ORAL NIGHTLY
Status: DISCONTINUED | OUTPATIENT
Start: 2019-12-19 | End: 2019-12-30 | Stop reason: HOSPADM

## 2019-12-19 RX ORDER — SYRING-NEEDL,DISP,INSUL,0.3 ML 29 G X1/2"
148 SYRINGE, EMPTY DISPOSABLE MISCELLANEOUS ONCE
Status: COMPLETED | OUTPATIENT
Start: 2019-12-19 | End: 2019-12-19

## 2019-12-19 RX ORDER — POTASSIUM CHLORIDE 20 MEQ/1
40 TABLET, EXTENDED RELEASE ORAL ONCE
Status: COMPLETED | OUTPATIENT
Start: 2019-12-19 | End: 2019-12-19

## 2019-12-19 RX ORDER — ACETAMINOPHEN 325 MG/1
650 TABLET ORAL ONCE
Status: COMPLETED | OUTPATIENT
Start: 2019-12-19 | End: 2019-12-19

## 2019-12-19 RX ORDER — POTASSIUM CHLORIDE 20 MEQ/1
20 TABLET, EXTENDED RELEASE ORAL ONCE
Status: COMPLETED | OUTPATIENT
Start: 2019-12-19 | End: 2019-12-19

## 2019-12-19 RX ADMIN — PREDNISONE 25 MG: 10 TABLET ORAL at 09:12

## 2019-12-19 RX ADMIN — PANTOPRAZOLE SODIUM 40 MG: 40 INJECTION, POWDER, FOR SOLUTION INTRAVENOUS at 08:12

## 2019-12-19 RX ADMIN — POLYETHYLENE GLYCOL 3350 17 G: 17 POWDER, FOR SOLUTION ORAL at 08:12

## 2019-12-19 RX ADMIN — METHYLPREDNISOLONE SODIUM SUCCINATE 40 MG: 40 INJECTION, POWDER, FOR SOLUTION INTRAMUSCULAR; INTRAVENOUS at 10:12

## 2019-12-19 RX ADMIN — NYSTATIN 500000 UNITS: 100000 SUSPENSION ORAL at 09:12

## 2019-12-19 RX ADMIN — DEXTROSE 1000 MG: 5 SOLUTION INTRAVENOUS at 09:12

## 2019-12-19 RX ADMIN — NYSTATIN 500000 UNITS: 100000 SUSPENSION ORAL at 04:12

## 2019-12-19 RX ADMIN — MAGNESIUM CITRATE 148 ML: 1.75 LIQUID ORAL at 03:12

## 2019-12-19 RX ADMIN — MUPIROCIN 1 G: 20 OINTMENT TOPICAL at 08:12

## 2019-12-19 RX ADMIN — MIRTAZAPINE 15 MG: 15 TABLET, FILM COATED ORAL at 11:12

## 2019-12-19 RX ADMIN — NYSTATIN 500000 UNITS: 100000 SUSPENSION ORAL at 11:12

## 2019-12-19 RX ADMIN — NYSTATIN 500000 UNITS: 100000 SUSPENSION ORAL at 08:12

## 2019-12-19 RX ADMIN — MUPIROCIN 1 G: 20 OINTMENT TOPICAL at 09:12

## 2019-12-19 RX ADMIN — ASPIRIN 325 MG ORAL TABLET 325 MG: 325 PILL ORAL at 08:12

## 2019-12-19 RX ADMIN — PREDNISONE 25 MG: 10 TABLET ORAL at 08:12

## 2019-12-19 RX ADMIN — POTASSIUM CHLORIDE 40 MEQ: 1500 TABLET, EXTENDED RELEASE ORAL at 10:12

## 2019-12-19 RX ADMIN — EPINEPHRINE 0.08 MCG/KG/MIN: 1 INJECTION INTRAMUSCULAR; INTRAVENOUS; SUBCUTANEOUS at 06:12

## 2019-12-19 RX ADMIN — INSULIN ASPART 2 UNITS: 100 INJECTION, SOLUTION INTRAVENOUS; SUBCUTANEOUS at 04:12

## 2019-12-19 RX ADMIN — DIPHENHYDRAMINE HYDROCHLORIDE 25 MG: 25 CAPSULE ORAL at 10:12

## 2019-12-19 RX ADMIN — ANTI-THYMOCYTE GLOBULIN (RABBIT) 150 MG: 5 INJECTION, POWDER, LYOPHILIZED, FOR SOLUTION INTRAVENOUS at 11:12

## 2019-12-19 RX ADMIN — POTASSIUM CHLORIDE 20 MEQ: 1500 TABLET, EXTENDED RELEASE ORAL at 06:12

## 2019-12-19 RX ADMIN — ACETAMINOPHEN 650 MG: 325 TABLET ORAL at 10:12

## 2019-12-19 RX ADMIN — DEXTROSE 1000 MG: 5 SOLUTION INTRAVENOUS at 08:12

## 2019-12-19 RX ADMIN — FUROSEMIDE 20 MG/HR: 10 INJECTION, SOLUTION INTRAMUSCULAR; INTRAVENOUS at 12:12

## 2019-12-19 NOTE — NURSING
Visited with pt for emotional support. Pt states that today is her first time OOB and to the chair, tolerated well but legs are weak. Pt is eager to learn from transplant coordinators when able.

## 2019-12-19 NOTE — PROGRESS NOTES
[facility]  Heart Transplant  Progress Note    Patient Name: Deborah Navas  MRN: [id number]  Admission Date: 11/13/2019  Hospital Length of Stay: 34 days  Attending Physician: Eric Antunez Jr.,*  Primary Care Provider: Primary Doctor No  Principal Problem:Status post heart transplant    Subjective:     Interval History: Pace off yesterday am and  and Epi started.   stopped after episode of hypotension (concern for over vasodilation).  Did well on Epi at 0.08 overnight.  Echo done yesterday with no effusion.  She was given first dose of Thymo yesterday as creat elevated.  Given one unit PRBC's.  Patient reports she is feeling well this morning and has no new complaints.  Pain is controlled with little or no pain medications.  She has not had BM since transplant.  She is on Miralax daily.  CVP: 15, SVO2: 68, CO: 11.5, CI: 5.32, SVR: 570 using dry weight of 99.8kg and oxygen consumption of 349    Lines:   Midline: Right cephalic vein: 12/4/19  Art Line: 12/16/19  Left IJ: 12/16/19  Pacer Wires: 12/16/19    Continuous Infusions:   epinephrine 0.08 mcg/kg/min (12/19/19 0800)    furosemide (LASIX) 2 mg/mL infusion (non-titrating) 20 mg/hr (12/19/19 0800)    niCARdipine 1 mg/hr (12/19/19 0705)    norepinephrine bitartrate-D5W Stopped (12/17/19 1300)    vasopressin (PITRESSIN) infusion Stopped (12/17/19 0830)     Scheduled Meds:   aspirin  325 mg Oral Daily    mupirocin  1 g Nasal BID    mycophenolate (CELLCEPT) IVPB  1,000 mg Intravenous Q12H    nystatin  500,000 Units Mouth/Throat QID (WM & HS)    pantoprazole  40 mg Intravenous Daily    polyethylene glycol  17 g Oral Daily    predniSONE  25 mg Oral BID     PRN Meds:sodium chloride, sodium chloride, acetaminophen, acetaminophen, bisacodyl, calcium gluconate IVPB, calcium gluconate IVPB, calcium gluconate IVPB, [COMPLETED] calcium gluconate IVPB **AND** calcium gluconate IVPB, Dextrose 10% Bolus, Dextrose 10% Bolus,  fentaNYL, glucagon (human recombinant), glucose, glucose, insulin aspart U-100, magnesium sulfate IVPB, magnesium sulfate IVPB, ondansetron, ondansetron, oxyCODONE, oxyCODONE, potassium chloride in water **AND** potassium chloride in water **AND** potassium chloride in water, sodium phosphate IVPB, sodium phosphate IVPB, sodium phosphate IVPB    Review of patient's allergies indicates:   Allergen Reactions    Adhesive Blisters     Reaction to area in chest and up only    Codeine Itching     Objective:     Vital Signs (Most Recent):  Temp: 98.1 °F (36.7 °C) (12/19/19 0845)  Pulse: 98 (12/19/19 0845)  Resp: (!) 23 (12/19/19 0845)  BP: (!) 142/64 (12/19/19 0800)  SpO2: 95 % (12/19/19 0845) Vital Signs (24h Range):  Temp:  [96.6 °F (35.9 °C)-99.1 °F (37.3 °C)] 98.1 °F (36.7 °C)  Pulse:  [] 98  Resp:  [15-41] 23  SpO2:  [91 %-99 %] 95 %  BP: ()/(44-70) 142/64  Arterial Line BP: ()/(40-64) 118/54     Patient Vitals for the past 72 hrs (Last 3 readings):   Weight   12/18/19 1500 99.8 kg (220 lb)     Body mass index is 35.51 kg/m².      Intake/Output Summary (Last 24 hours) at 12/19/2019 0848  Last data filed at 12/19/2019 0705  Gross per 24 hour   Intake 2575 ml   Output 4715 ml   Net -2140 ml       Hemodynamic Parameters:  Telemetry: reviewed: sinus tach: rates in 90's  Physical Exam  Constitutional: laying in bed NAD  Head: Normocephalic and atraumatic.   Eyes: Conjunctivae are normal.   Neck: Neck supple. Left IJ in place  Cardiovascular: Regular rate and rhythm; S1, S2 normal.  No rub, gallop or murmer  Pulmonary/Chest: Effort normal. Chest tubes intact. Serosanguinous drainage. Pacing wires in place.    Abdominal: Soft. There is no tenderness.   Musculoskeletal: She exhibits no deformity. Right femoral access site covered with dressing. No issues.    Neurological: She is alert.   Skin: Skin is warm and dry.   Psychiatric: Mood and affect appropriate    Nursing note and vitals  reviewed.      Significant Labs:  CBC:  Recent Labs   Lab 12/18/19 2357 12/19/19  0321 12/19/19  0800   WBC 15.33* 15.71* 14.81*   RBC 2.86* 2.83* 2.87*   HGB 7.8* 7.8* 7.9*   HCT 24.8* 24.8* 24.7*   * 115* 132*   MCV 87 88 86   MCH 27.3 27.6 27.5   MCHC 31.5* 31.5* 32.0     BNP:  Recent Labs   Lab 12/13/19  0400 12/16/19  0158   * 271*     CMP:  Recent Labs   Lab 12/18/19 2002 12/18/19  2357 12/19/19  0321   * 161* 159*   CALCIUM 8.4* 8.7 8.5*   ALBUMIN 3.1* 3.2* 3.3*   PROT 5.4* 5.6* 5.8*   * 138 137   K 3.9 4.0 3.7   CO2 21* 23 23    101 101   BUN 64* 63* 64*   CREATININE 3.5* 3.3* 3.2*   ALKPHOS 80 63 67   ALT 39 37 32   * 145* 130*   BILITOT 0.6 0.6 0.7      Coagulation:   Recent Labs   Lab 12/16/19  1613 12/17/19  0102 12/17/19  0459 12/18/19  0333 12/19/19  0321   INR 1.3* 1.3* 1.3* 1.4* 1.4*   APTT 27.4 23.1 24.5  --   --      LDH:  No results for input(s): LDH in the last 72 hours.  Microbiology:  Microbiology Results (last 7 days)     Procedure Component Value Units Date/Time    Urine Culture High Risk [[id number]] Collected:  12/15/19 0646    Order Status:  Completed Specimen:  Urine, Catheterized Updated:  12/16/19 0840     Urine Culture, Routine No significant growth    Narrative:       Indicated criteria for high risk culture:->Other  Other (specify):->awaiting heart transplant    Urine culture [[id number]] Collected:  12/15/19 0646    Order Status:  Canceled Specimen:  Urine, Catheterized           I have reviewed all pertinent labs within the past 24 hours.    Estimated Creatinine Clearance: 25.1 mL/min (A) (based on SCr of 3.2 mg/dL (H)).    Diagnostic Results:  I have reviewed all pertinent imaging results/findings within the past 24 hours.    Assessment and Plan:     48 yo WF with NICM, s/p HM3 implantation 9/10/19 (post up coarse uncomplicated with the exception of+ diaphragmatic stimulation of LV lead and pleural effusion with chest tube placement, atrial  flutter with NADINE/DCCV), V-fib reported in setting of hypokalemia with appropriate shock from ICD on Amiodarone prior to LVAD placement; and recent hospitalizations for ventricular arrythmias; started on Mexilitine.  She was seen in EP clinic today and she continues to have multiple VT episodes with some ATP therapy, although no ICD shocks since starting mexiletine 10/24/2019. One slow VT episode 2.5hrs 11/3/2019. Patient asymptomatic with LVAD. On coumadin. No AFL since post-op event (paced out of rhythm 9/24/2019). CHB with 100% V pacing (LV lead off). She does not feel well. Dry heaving with mexiletine. Taking phenergan PRN. 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   She was seen in HTS clinic and reported 4 low flow alarms the last 4 nights.  She reports they occur in her sleep and last for only a few seconds.  By the time she wakes up; they quickly stop.  She does report to possibly being little volume overloaded.  She hasn't noticed weight gain at home but thinks she may have little extra fluid.  She denies SOB, chest pain, palpitations, LEGER. In review of her records: she was 223lbs on 10/24/19 during previous hospitalization and is 235lbs now.  Her lasix had previously been decreased to prn.     * Status post heart transplant  -Transplant Date 12/16/2019  -[facility]  -Transplanted by: Dr. Nuñez  -CMV Status:D-/R+   -Rejection status: Low Risk  -Hemodynamic support with: epi 0.08 mcg/kg/min IV;  stopped yesterday due to hypotension  -CO: 11.5, CI: 5.32 and SVR: 570; will wean Epi as tolerated   -Current immunosuppression:Prednisone 25mg BID, MMF 1000 BID.  First dose of Thymo given 12/18 due to GLO and creat of 3.3.  Will redose Thymo today and monitor  -Chest tube management  & pacer wires per CTS team  -HM 3 implanted 9/10/19 removed during transplant.      GLO (acute kidney injury)  - Baseline creat was 1.3-1.6  - Acute elevation post transplant  - Will avoid  nephrotoxins and monitor     Ventricular tachycardia  - Per EP office visit on day of admission: She continues to have multiple VT episodes with some ATP therapy, although no ICD shocks since starting mexiletine 10/24/2019. One slow VT episode 2.5hrs 11/3/2019. Patient asymptomatic with LVAD. On coumadin. No AFL since post-op event (paced out of rhythm 9/24/2019). CHB with 100% V pacing (LV lead off).   -Prior to transplant; she was continued on po Amio and Mexiletine.  She had Multiple shocks for MMVT and was reloaded on Amio and Mexiletine increased to 200     CKD (chronic kidney disease)  - Baseline creat appears 1.3-1.6.   - see above GLO      Nausea  - has intermittent nausea/vomiting, was multifactorial with RVF and mexiletine  - treated with BID PPI as well as sucralfate, and H2 blocker   - zofran and phenergan used cautiously due to hx of VT storm    Uninterrupted Critical Care/Counseling Time (not including procedures): 60 minutes      ZAC Kelly  Heart Transplant  [facility]

## 2019-12-19 NOTE — NURSING
Dr. Hairston made aware that following working with PT/OT and getting up to the chair her arterial line site began to be painful. Upon assessment, the patient's arm was more red and stiffer. Orders given to discontinue arterial line and go by cuff pressures. Cardene off at the time. Restarted to maintain parameters by cuff. Epi @ 0.08, Lasix @ 20. All pulses 2+. Will continue to monitor site frequently.     Patient received immediate relief from symptoms following removal of her arterial line.

## 2019-12-19 NOTE — CARE UPDATE
BG goal 140-180    Remains in ICU, NAEON.   BG well controlled without insulin. Prednisone 25 mg BID; steroid taper per primary.   Diet clear liquid    Plan:BG monitoring ac/hs and low dose correction scale.     Discharge planning: TBD     Endocrine to continue to follow    ** Please call Endocrine for any BG related issues **

## 2019-12-19 NOTE — ASSESSMENT & PLAN NOTE
-Transplant Date 12/16/2019  -[facility]  -Transplanted by: Dr. Nuñez  -CMV Status:D-/R+   -Rejection status: Low Risk  -Hemodynamic support with: epi 0.08 mcg/kg/min IV;  stopped yesterday due to hypotension  -CO: 11.5, CI: 5.32 and SVR: 570; will wean Epi as tolerated   -Current immunosuppression:Prednisone 25mg BID, MMF 1000 BID.  First dose of Thymo given 12/18 due to GLO and creat of 3.3.  Will redose Thymo today and monitor  -Chest tube management  & pacer wires per CTS team  -HM 3 implanted 9/10/19 removed during transplant.

## 2019-12-19 NOTE — PLAN OF CARE
"Dx: Status post heart transplant    Shift Events: VSS at this time. O2 @ 2 L NC. Afebrile. Epi weaned to 0.05 and Cardene turned off to maintain a SBP < 140. Arterial line removed for symptomatic site. Painful and stiff. MDs aware. Okay to use cuff pressures. Patient OOB to chair with PT/OT. No issues with moving. No BM during shift. Magnesium citrate given. Plan of care reviewed with patient and friend. Questions and concerns addressed. Will continue to monitor.     Neuro: AAO x4, Arouses to Voice, Follows Commands and Moves All Extremities    Vital Signs: BP (!) 101/52 (BP Location: Right arm, Patient Position: Sitting)   Pulse 98   Temp 98.6 °F (37 °C)   Resp (!) 28   Ht 5' 6" (1.676 m)   Wt 99.8 kg (220 lb)   LMP  (LMP Unknown)   SpO2 95%   Breastfeeding? No   BMI 35.51 kg/m²     Diet: Cardiac Diet    Gtts: Epinephrine Furosemide    Urine Output: Urinary Catheter 3,250 cc/shift    Drains: Chest Tube, total output 10 cc /  shift Chest Tube (Pleural) 80 cc /shift     Labs/Accuchecks: Accuchecks ACHS. CBC, CMP, Mag q 4 hours.     Skin: No skin breakdown noted. Foam dressings in place. Patient turns q 2 hours independently.        "

## 2019-12-19 NOTE — ASSESSMENT & PLAN NOTE
- Per EP office visit on day of admission: She continues to have multiple VT episodes with some ATP therapy, although no ICD shocks since starting mexiletine 10/24/2019. One slow VT episode 2.5hrs 11/3/2019. Patient asymptomatic with LVAD. On coumadin. No AFL since post-op event (paced out of rhythm 9/24/2019). CHB with 100% V pacing (LV lead off).   -Prior to transplant; she was continued on po Amio and Mexiletine.  She had Multiple shocks for MMVT and was reloaded on Amio and Mexiletine increased to 200

## 2019-12-19 NOTE — CARE UPDATE
Repeated HD for Ms. Deborah Navas     Objective:   Vitals:    12/18/19 1915   BP: 110/55 (74)    Pulse: 100   Resp: (!) 22   Temp: 98.6 °F (37 °C)     Hemodynamics:   Parameter  12/18 at 1500 12/18 at 2000   CVP 14 15   SvO2 65 60   UOP  mL/hr  mL/hr   CI  3.2 2.7   CO 6.8 6    680     Current Heart Failure Medications:  - Off Dobutamine since 1500 (given episode of hypotension)   - Off Simno since AM  - Epinephrine 0.08 mcg/kg/min  - Lasix 20 mg/hr    Assessment/Plan:  - Stable condition and continue current therapy   - Goal for UOP ~  mL/hr  - Repeat above hemodynamics  - Plan was discussed with attending staff     Yeyo Young MD  Cardiology Fellow (PGY-IV)  Pager: [phone]

## 2019-12-19 NOTE — ASSESSMENT & PLAN NOTE
- Baseline creat was 1.3-1.6  - Acute elevation post transplant  - Will avoid nephrotoxins and monitor

## 2019-12-19 NOTE — PLAN OF CARE
Problem: Physical Therapy Goal  Goal: Physical Therapy Goal  Description  Goals to be met by: 2020     Patient will increase functional independence with mobility by performin.Supine to Sit with CGA - not met  2. Sit to stand with Supervision - not met  3. Gait x 150ft with Supervision - not met  4. Ascend/descend 4 stairs with CGA - not met  5. Standing for 3 minutes with CGA to increase activity tolerance - not met       Outcome: Ongoing, Progressing       Re-EVAL completed and goals appropriate

## 2019-12-19 NOTE — PT/OT/SLP RE-EVAL
Occupational Therapy   Re-evaluation    Name: Deborah Navas  MRN: [id number]  Admitting Diagnosis:  Status post heart transplant 3 Days Post-Op    Recommendations:     Discharge Recommendations: home  Discharge Equipment Recommendations:     Barriers to discharge:  None    Assessment:     Deborah Navas is a 49 y.o. female with a medical diagnosis of Status post heart transplant.   Performance deficits affecting function are weakness, impaired self care skills, impaired functional mobilty, impaired endurance, impaired balance, decreased upper extremity function, gait instability, impaired cardiopulmonary response to activity, pain.      Rehab Prognosis:  Good; patient would benefit from acute skilled OT services to address these deficits and reach maximum level of function.       Plan:     Patient to be seen 4 x/week to address the above listed problems via self-care/home management, therapeutic activities, therapeutic exercises  · Plan of Care Expires: 01/18/20  · Plan of Care Reviewed with: patient, caregiver    Subjective     Chief Complaint: pain at Chest tube site  Patient/Family stated goals: to get better and go home  Communicated with: RN prior to session.  Pain/Comfort:  · Pain Rating 1: 6/10  · Location - Side 1: Right  · Location - Orientation 1: lateral  · Location 1: chest(at CT insertion site)  · Pain Addressed 1: Reposition, Distraction  · Pain Rating Post-Intervention 1: 6/10    Objective:     Communicated with: RN prior to session.  Patient found supine with: arterial line, blood pressure cuff, central line, chest tube, parrish catheter, telemetry, pulse ox (continuous), peripheral IV(pacer wires, NO, Midline) upon OT entry to room.    General Precautions: Standard, fall, sternal   Orthopedic Precautions:N/A   Braces:       Occupational Performance:    Bed Mobility:    · Patient completed Rolling/Turning to Left with  minimum assistance  · Patient completed Supine to Sit with minimum  assistance    Functional Mobility/Transfers:  · Patient completed Sit <> Stand Transfer with contact guard assistance  with  no assistive device   · Patient completed Bed <> Chair Transfer using Step Transfer technique with contact guard assistance with no assistive device  · Functional Mobility: CGA to chair x 5 steps    Activities of Daily Living:  · Grooming: modified independence seated in chair  · Upper Body Dressing: minimum assistance    · Lower Body Dressing: minimum assistance donning socks seated EOB    Cognitive/Visual Perceptual:  Oriented to: Person, Place, Time and Situation  Follows Commands/attention: Follows multistep  commands  Communication: clear/fluent  Memory:  No Deficits noted  Safety awareness/insight to disability: intact  Coping skills/emotional control: Appropriate to situation    Physical Exam:  Postural examination/scapula alignment:    -       No postural abnormalities identified  Sensation:    -       Intact  Upper Extremity Range of Motion:     -       Right Upper Extremity: WFL  -       Left Upper Extremity: WFL  Upper Extremity Strength:    -       Right Upper Extremity: WFL  -       Left Upper Extremity: WFL   Strength:    -       Right Upper Extremity: WFL  -       Left Upper Extremity: WFL  Fine Motor Coordination:    -       Intact  Gross motor coordination:   WFL    AMPAC 6 Click:  AMPAC Total Score: 19    Treatment & Education:  Education:  Pt ed on OT POC  Pt ed on sternal precautions during ADL; handout provided  Pt ed on wearing mask when out of room  Pt ed on ROM Ex's 3x daily for increased overall strength  And endurance    Patient left up in chair with all lines intact, call button in reach and RN notified    GOALS:   Multidisciplinary Problems     Occupational Therapy Goals        Problem: Occupational Therapy Goal    Goal Priority Disciplines Outcome Interventions   Occupational Therapy Goal     OT, PT/OT Ongoing, Progressing    Description:  Goals to be met by:  1/2/19     Patient will increase functional independence with ADLs by performing:    UE Dressing with Modified [place].  LE Dressing with Modified [place].  Grooming while standing at sink with Modified [place] .  Toileting from toilet with Modified [place] for hygiene and clothing management.   Toilet transfers to toilet with Modified [place]                       History:     Past Medical History:   Diagnosis Date    Fractures     History of ventricular fibrillation 9/4/2019    History of ventricular tachycardia 9/4/2019    Hyperlipidemia     Migraine     Osteoarthritis        Past Surgical History:   Procedure Laterality Date    BACK SURGERY      2007    BONE GRAFT Left     from Left hip to Left FA    eardrum reconstruction  1980    ELBOW SURGERY Left [id number]    FOREARM FRACTURE SURGERY Bilateral [id number]    multiple surgeries    HEART TRANSPLANT N/A 12/16/2019    Procedure: TRANSPLANT, HEART;  Surgeon: Talha Nuñez MD;  Location: [facility] OR [address] [place] [address];  Service: Cardiothoracic;  Laterality: N/A;    INSERTION OF GRAFT TO PERICARDIUM  9/10/2019    Procedure: INSERTION, GRAFT, PERICARDIUM;  Surgeon: Shar Walden MD;  Location: [facility] OR [address] [place] [address];  Service: Cardiovascular;;    INSERTION OF IMPLANTABLE CARDIOVERTER-DEFIBRILLATOR (ICD) GENERATOR WITH TWO EXISTING LEADS      INSERTION OF PACEMAKER Left     LEFT VENTRICULAR ASSIST DEVICE N/A 9/10/2019    Procedure: INSERTION-LEFT VENTRICULAR ASSIST DEVICE;  Surgeon: Shar Walden MD;  Location: [facility] OR [address] [place] [address];  Service: Cardiovascular;  Laterality: N/A;  DT HM3     LUMBAR FUSION  2007    L4-L5    RIGHT HEART CATHETERIZATION Right 9/4/2019    Procedure: INSERTION, CATHETER, RIGHT HEART;  Surgeon: Vi Bryant MD;  Location: [facility] CATH LAB;  Service: Cardiology;  Laterality: Right;    RIGHT HEART CATHETERIZATION N/A 11/18/2019    Procedure: INSERTION, CATHETER, RIGHT HEART;  Surgeon: Eric Antunez Jr., MD;   Location: [facility] CATH LAB;  Service: Cardiology;  Laterality: N/A;    SINUS SURGERY Right 1994    with lymph nodes    STERNAL WOUND CLOSURE  9/10/2019    Procedure: CLOSURE, WOUND, STERNUM;  Surgeon: Shar Walden MD;  Location: [facility] OR [facility] FLR;  Service: Cardiovascular;;    TEMPOROMANDIBULAR JOINT SURGERY Right 1988    TONSILLECTOMY      TREATMENT OF CARDIAC ARRHYTHMIA N/A 9/24/2019    Procedure: CARDIOVERSION;  Surgeon: Moe Barrera MD;  Location: [facility] EP LAB;  Service: Cardiology;  Laterality: N/A;  AF, DCCV/NADINE, anes, DM, Rm 3073    TYMPANOSTOMY TUBE PLACEMENT  1971- 1979    multiple tube placements       Time Tracking:     OT Date of Treatment: 12/19/19  OT Start Time: 1030  OT Stop Time: 1047  OT Total Time (min): 17 min    Billable Minutes:Re-eval 7  Therapeutic Exercise 10    JAGJIT Byrne  12/19/2019

## 2019-12-19 NOTE — ASSESSMENT & PLAN NOTE
- has intermittent nausea/vomiting, was multifactorial with RVF and mexiletine  - treated with BID PPI as well as sucralfate, and H2 blocker   - zofran and phenergan used cautiously due to hx of VT storm

## 2019-12-19 NOTE — PLAN OF CARE
POC reviewed with pt and family. Pt AAOx4 and following commands. Pt on nontitrating epi @0.08 mcg/kg/min, lasix @ 20 mg/hr, and cardene (titraed per order) to keep MAP 60-80. Pt on 2L NC. No complaints of SOB. UO >200 throughout shift. CVP 15,15, and 14. Chest tubes with minimal serosanguinous drainage. Dressings dry and intact. No complaints of pain. Skin free from any new breakdown. See flow sheets for additional details. VSS.

## 2019-12-19 NOTE — SUBJECTIVE & OBJECTIVE
Interval History: Pace off yesterday am and  and Epi started.   stopped after episode of hypotension (concern for over vasodilation).  Did well on Epi at 0.08 overnight.  Echo done yesterday with no effusion.  She was given first dose of Thymo yesterday as creat elevated.  Given one unit PRBC's.  Patient reports she is feeling well this morning and has no new complaints.  Pain is controlled with little or no pain medications.  She has not had BM since transplant.  She is on Miralax daily.      Lines:   Midline: Right cephalic vein: 12/4/19  Art Line: 12/16/19  Left IJ: 12/16/19  Pacer Wires: 12/16/19    Continuous Infusions:   epinephrine 0.08 mcg/kg/min (12/19/19 0800)    furosemide (LASIX) 2 mg/mL infusion (non-titrating) 20 mg/hr (12/19/19 0800)    niCARdipine 1 mg/hr (12/19/19 0705)    norepinephrine bitartrate-D5W Stopped (12/17/19 1300)    vasopressin (PITRESSIN) infusion Stopped (12/17/19 0830)     Scheduled Meds:   aspirin  325 mg Oral Daily    mupirocin  1 g Nasal BID    mycophenolate (CELLCEPT) IVPB  1,000 mg Intravenous Q12H    nystatin  500,000 Units Mouth/Throat QID (WM & HS)    pantoprazole  40 mg Intravenous Daily    polyethylene glycol  17 g Oral Daily    predniSONE  25 mg Oral BID     PRN Meds:sodium chloride, sodium chloride, acetaminophen, acetaminophen, bisacodyl, calcium gluconate IVPB, calcium gluconate IVPB, calcium gluconate IVPB, [COMPLETED] calcium gluconate IVPB **AND** calcium gluconate IVPB, Dextrose 10% Bolus, Dextrose 10% Bolus, fentaNYL, glucagon (human recombinant), glucose, glucose, insulin aspart U-100, magnesium sulfate IVPB, magnesium sulfate IVPB, ondansetron, ondansetron, oxyCODONE, oxyCODONE, potassium chloride in water **AND** potassium chloride in water **AND** potassium chloride in water, sodium phosphate IVPB, sodium phosphate IVPB, sodium phosphate IVPB    Review of patient's allergies indicates:   Allergen Reactions    Adhesive Blisters     Reaction to  area in chest and up only    Codeine Itching     Objective:     Vital Signs (Most Recent):  Temp: 98.1 °F (36.7 °C) (12/19/19 0845)  Pulse: 98 (12/19/19 0845)  Resp: (!) 23 (12/19/19 0845)  BP: (!) 142/64 (12/19/19 0800)  SpO2: 95 % (12/19/19 0845) Vital Signs (24h Range):  Temp:  [96.6 °F (35.9 °C)-99.1 °F (37.3 °C)] 98.1 °F (36.7 °C)  Pulse:  [] 98  Resp:  [15-41] 23  SpO2:  [91 %-99 %] 95 %  BP: ()/(44-70) 142/64  Arterial Line BP: ()/(40-64) 118/54     Patient Vitals for the past 72 hrs (Last 3 readings):   Weight   12/18/19 1500 99.8 kg (220 lb)     Body mass index is 35.51 kg/m².      Intake/Output Summary (Last 24 hours) at 12/19/2019 0848  Last data filed at 12/19/2019 0705  Gross per 24 hour   Intake 2575 ml   Output 4715 ml   Net -2140 ml       Hemodynamic Parameters:       Telemetry: reviewed: sinus tach: rates in 90's  Physical Exam  Constitutional: laying in bed NAD  Head: Normocephalic and atraumatic.   Eyes: Conjunctivae are normal.   Neck: Neck supple. Left IJ in place  Cardiovascular: Regular rate and rhythm; S1, S2 normal.  No rub, gallop or murmer  Pulmonary/Chest: Effort normal. Chest tubes intact. Serosanguinous drainage. Pacing wires in place.    Abdominal: Soft. There is no tenderness.   Musculoskeletal: She exhibits no deformity. Right femoral access site covered with dressing. No issues.    Neurological: She is alert.   Skin: Skin is warm and dry.   Psychiatric: Mood and affect appropriate    Nursing note and vitals reviewed.      Significant Labs:  CBC:  Recent Labs   Lab 12/18/19  2357 12/19/19  0321 12/19/19  0800   WBC 15.33* 15.71* 14.81*   RBC 2.86* 2.83* 2.87*   HGB 7.8* 7.8* 7.9*   HCT 24.8* 24.8* 24.7*   * 115* 132*   MCV 87 88 86   MCH 27.3 27.6 27.5   MCHC 31.5* 31.5* 32.0     BNP:  Recent Labs   Lab 12/13/19  0400 12/16/19  0158   * 271*     CMP:  Recent Labs   Lab 12/18/19 2002 12/18/19  2357 12/19/19  0321   * 161* 159*   CALCIUM 8.4*  8.7 8.5*   ALBUMIN 3.1* 3.2* 3.3*   PROT 5.4* 5.6* 5.8*   * 138 137   K 3.9 4.0 3.7   CO2 21* 23 23    101 101   BUN 64* 63* 64*   CREATININE 3.5* 3.3* 3.2*   ALKPHOS 80 63 67   ALT 39 37 32   * 145* 130*   BILITOT 0.6 0.6 0.7      Coagulation:   Recent Labs   Lab 12/16/19  1613 12/17/19  0102 12/17/19  0459 12/18/19  0333 12/19/19  0321   INR 1.3* 1.3* 1.3* 1.4* 1.4*   APTT 27.4 23.1 24.5  --   --      LDH:  No results for input(s): LDH in the last 72 hours.  Microbiology:  Microbiology Results (last 7 days)     Procedure Component Value Units Date/Time    Urine Culture High Risk [[id number]] Collected:  12/15/19 0646    Order Status:  Completed Specimen:  Urine, Catheterized Updated:  12/16/19 0840     Urine Culture, Routine No significant growth    Narrative:       Indicated criteria for high risk culture:->Other  Other (specify):->awaiting heart transplant    Urine culture [[id number]] Collected:  12/15/19 0646    Order Status:  Canceled Specimen:  Urine, Catheterized           I have reviewed all pertinent labs within the past 24 hours.    Estimated Creatinine Clearance: 25.1 mL/min (A) (based on SCr of 3.2 mg/dL (H)).    Diagnostic Results:  I have reviewed all pertinent imaging results/findings within the past 24 hours.

## 2019-12-19 NOTE — NURSING
Dr. Hairston made aware of patient's K+ of 3.7. No orders given at this time. Will recheck at 1600. Will continue to monitor.

## 2019-12-19 NOTE — NURSING
Called and informed HTS resident on call about patient's SBP in the 70's and MAP's in the 50's. Patient is asymptomatic and all four extremities are warm to touch. Resident stated that she will inform Dr. Maloney and call back. No new orders at this time. Will continue to monitor patient closely.

## 2019-12-19 NOTE — PLAN OF CARE
Problem: Occupational Therapy Goal  Goal: Occupational Therapy Goal  Description  Goals to be met by: 1/2/19     Patient will increase functional independence with ADLs by performing:    UE Dressing with Modified [place].  LE Dressing with Modified [place].  Grooming while standing at sink with Modified [place] .  Toileting from toilet with Modified [place] for hygiene and clothing management.   Toilet transfers to toilet with Modified [place]      Outcome: Ongoing, Progressing   OT re-eval completed, and above goals established. JAGJIT Byrne  12/19/2019

## 2019-12-19 NOTE — PT/OT/SLP RE-EVAL
Physical Therapy Re-evaluation    Patient Name:  Deborah Navas   MRN:  [id number]    Recommendations:     Discharge Recommendations:  home health PT   Discharge Equipment Recommendations: none       Assessment:     Deborah Navas is a 49 y.o. female admitted with a medical diagnosis of Status post heart transplant.  She presents with the following impairments/functional limitations:  weakness, impaired endurance, impaired self care skills, impaired functional mobilty, gait instability, impaired balance, decreased upper extremity function, pain, impaired cardiopulmonary response to activity.     Rehab Prognosis:  Good; patient would benefit from acute skilled PT services to address these deficits and reach maximum level of function.      Recent Surgery: Procedure(s) (LRB):  TRANSPLANT, HEART (N/A) 3 Days Post-Op    Plan:     During this hospitalization, patient to be seen 4 x/week to address the above listed problems via gait training, therapeutic activities, therapeutic exercises, neuromuscular re-education  · Plan of Care Expires:  01/16/20   Plan of Care Reviewed with: patient, friend    Subjective     Communicated with RN prior to session and friend present in room.  Patient found HOB elevated with telemetry, pulse ox (continuous), blood pressure cuff, central line, peripheral IV, chest tube, parrish catheter, arterial line(external pacer) upon PT entry to room, agreeable to evaluation.      Chief Complaint: pain  Patient comments/goals: to get better and return home   Pain/Comfort:  · Pain Rating 1: 6/10(sternum)  · Pain Addressed 1: Reposition, Distraction  · Pain Rating Post-Intervention 1: 6/10    Patients cultural, spiritual, Muslim conflicts given the current situation: no      Objective:     Patient found with: telemetry, pulse ox (continuous), blood pressure cuff, central line, peripheral IV, chest tube, parrish catheter, arterial line(external pacer)     General Precautions: Standard,  fall, sternal   Orthopedic Precautions:N/A   Braces: N/A     Exams:  · Cognitive Exam:    · AAOx4  · Follows multistep commands  · Communication clear/fluent   · RLE ROM: WFL  · RLE Strength: grossly 4/5  · LLE ROM: WFL  · LLE Strength: grossly 4/5    Functional Mobility:  · Bed Mobility:     · Scooting: minimum assistance  · Supine to Sit: minimum assistance  · Transfers:     · Sit to Stand:  minimum assistance with no AD from EOB   · Gait: 4ft with min A to chair   · No LOB  · No SOB  · Pt demo'd decreased jonathan and decreased step length     AM-PAC 6 CLICK MOBILITY  Total Score:17       Therapeutic Activities and Exercises:  Educated pt on PT role/POC  Educated pt on importance of OOB activity and daily ambulation   Educated pt on sternal precautions  Pt verbalized understanding     T/f to chair to increase tolerance to OOB activity and to create optimal positioning for lung expansion     Patient left up in chair with all lines intact, call button in reach and RN notified.    GOALS:   Multidisciplinary Problems     Physical Therapy Goals        Problem: Physical Therapy Goal    Goal Priority Disciplines Outcome Goal Variances Interventions   Physical Therapy Goal     PT, PT/OT Ongoing, Progressing     Description:  Goals to be met by: 2020     Patient will increase functional independence with mobility by performin.Supine to Sit with CGA - not met  2. Sit to stand with Supervision - not met  3. Gait x 150ft with Supervision - not met  4. Ascend/descend 4 stairs with CGA - not met  5. Standing for 3 minutes with CGA to increase activity tolerance - not met                        History:     Past Medical History:   Diagnosis Date    Fractures     History of ventricular fibrillation 2019    History of ventricular tachycardia 2019    Hyperlipidemia     Migraine     Osteoarthritis        Past Surgical History:   Procedure Laterality Date    BACK SURGERY          BONE GRAFT Left      from Left hip to Left FA    eardrum reconstruction  1980    ELBOW SURGERY Left [id number]    FOREARM FRACTURE SURGERY Bilateral [id number]    multiple surgeries    HEART TRANSPLANT N/A 12/16/2019    Procedure: TRANSPLANT, HEART;  Surgeon: Talha Nuñez MD;  Location: [facility] OR [address] [place] [address];  Service: Cardiothoracic;  Laterality: N/A;    INSERTION OF GRAFT TO PERICARDIUM  9/10/2019    Procedure: INSERTION, GRAFT, PERICARDIUM;  Surgeon: Shar Walden MD;  Location: [facility] OR [address] [place] [address];  Service: Cardiovascular;;    INSERTION OF IMPLANTABLE CARDIOVERTER-DEFIBRILLATOR (ICD) GENERATOR WITH TWO EXISTING LEADS      INSERTION OF PACEMAKER Left     LEFT VENTRICULAR ASSIST DEVICE N/A 9/10/2019    Procedure: INSERTION-LEFT VENTRICULAR ASSIST DEVICE;  Surgeon: Shar Walden MD;  Location: [facility] OR [address] [place] [address];  Service: Cardiovascular;  Laterality: N/A;  DT HM3     LUMBAR FUSION  2007    L4-L5    RIGHT HEART CATHETERIZATION Right 9/4/2019    Procedure: INSERTION, CATHETER, RIGHT HEART;  Surgeon: Vi Bryant MD;  Location: [facility] CATH LAB;  Service: Cardiology;  Laterality: Right;    RIGHT HEART CATHETERIZATION N/A 11/18/2019    Procedure: INSERTION, CATHETER, RIGHT HEART;  Surgeon: Eric Antunez Jr., MD;  Location: [facility] CATH LAB;  Service: Cardiology;  Laterality: N/A;    SINUS SURGERY Right 1994    with lymph nodes    STERNAL WOUND CLOSURE  9/10/2019    Procedure: CLOSURE, WOUND, STERNUM;  Surgeon: Shar Walden MD;  Location: [facility] OR [address] [place] [address];  Service: Cardiovascular;;    TEMPOROMANDIBULAR JOINT SURGERY Right 1988    TONSILLECTOMY      TREATMENT OF CARDIAC ARRHYTHMIA N/A 9/24/2019    Procedure: CARDIOVERSION;  Surgeon: Moe Barrera MD;  Location: [facility] EP LAB;  Service: Cardiology;  Laterality: N/A;  AF, DCCV/NADINE, anes, DM, Rm 3073    TYMPANOSTOMY TUBE PLACEMENT  1971- 1979    multiple tube placements       Time Tracking:     PT Received On: 12/19/19  PT Start Time: 1032     PT Stop Time: 1054  PT Total  Time (min): 22 min     Billable Minutes: Re-eval 10 and Gait Training 8    Niurka Posadas, PT, DPT  12/19/2019  [phone]'

## 2019-12-20 PROBLEM — Z94.1 HEART REPLACED BY TRANSPLANT: Status: ACTIVE | Noted: 2019-12-20

## 2019-12-20 LAB
ABSOLUTE CD3: 3 CELLS/UL (ref 700–2100)
ABSOLUTE CD3: 4 CELLS/UL (ref 700–2100)
ALBUMIN SERPL BCP-MCNC: 2.8 G/DL (ref 3.5–5.2)
ALBUMIN SERPL BCP-MCNC: 3.2 G/DL (ref 3.5–5.2)
ALBUMIN SERPL BCP-MCNC: 3.4 G/DL (ref 3.5–5.2)
ALLENS TEST: ABNORMAL
ALLENS TEST: ABNORMAL
ALP SERPL-CCNC: 48 U/L (ref 55–135)
ALP SERPL-CCNC: 61 U/L (ref 55–135)
ALP SERPL-CCNC: 63 U/L (ref 55–135)
ALT SERPL W/O P-5'-P-CCNC: 37 U/L (ref 10–44)
ALT SERPL W/O P-5'-P-CCNC: 38 U/L (ref 10–44)
ALT SERPL W/O P-5'-P-CCNC: 41 U/L (ref 10–44)
ANION GAP SERPL CALC-SCNC: 10 MMOL/L (ref 8–16)
ANION GAP SERPL CALC-SCNC: 11 MMOL/L (ref 8–16)
ANION GAP SERPL CALC-SCNC: 11 MMOL/L (ref 8–16)
ANION GAP SERPL CALC-SCNC: 12 MMOL/L (ref 8–16)
ANION GAP SERPL CALC-SCNC: 13 MMOL/L (ref 8–16)
ANISOCYTOSIS BLD QL SMEAR: SLIGHT
AST SERPL-CCNC: 76 U/L (ref 10–40)
AST SERPL-CCNC: 90 U/L (ref 10–40)
AST SERPL-CCNC: 90 U/L (ref 10–40)
BASOPHILS # BLD AUTO: 0 K/UL (ref 0–0.2)
BASOPHILS # BLD AUTO: 0.01 K/UL (ref 0–0.2)
BASOPHILS # BLD AUTO: 0.01 K/UL (ref 0–0.2)
BASOPHILS NFR BLD: 0 % (ref 0–1.9)
BASOPHILS NFR BLD: 0.1 % (ref 0–1.9)
BASOPHILS NFR BLD: 0.2 % (ref 0–1.9)
BILIRUB SERPL-MCNC: 0.7 MG/DL (ref 0.1–1)
BILIRUB SERPL-MCNC: 0.7 MG/DL (ref 0.1–1)
BILIRUB SERPL-MCNC: 0.8 MG/DL (ref 0.1–1)
BUN SERPL-MCNC: 47 MG/DL (ref 6–20)
BUN SERPL-MCNC: 54 MG/DL (ref 6–20)
BUN SERPL-MCNC: 56 MG/DL (ref 6–20)
BUN SERPL-MCNC: 57 MG/DL (ref 6–20)
BUN SERPL-MCNC: 57 MG/DL (ref 6–20)
CALCIUM SERPL-MCNC: 7.5 MG/DL (ref 8.7–10.5)
CALCIUM SERPL-MCNC: 9 MG/DL (ref 8.7–10.5)
CALCIUM SERPL-MCNC: 9.1 MG/DL (ref 8.7–10.5)
CALCIUM SERPL-MCNC: 9.1 MG/DL (ref 8.7–10.5)
CALCIUM SERPL-MCNC: 9.2 MG/DL (ref 8.7–10.5)
CD3%: 3.1 % (ref 55–83)
CD3%: 3.7 % (ref 55–83)
CHLORIDE SERPL-SCNC: 100 MMOL/L (ref 95–110)
CHLORIDE SERPL-SCNC: 107 MMOL/L (ref 95–110)
CHLORIDE SERPL-SCNC: 98 MMOL/L (ref 95–110)
CHLORIDE SERPL-SCNC: 99 MMOL/L (ref 95–110)
CHLORIDE SERPL-SCNC: 99 MMOL/L (ref 95–110)
CO2 SERPL-SCNC: 26 MMOL/L (ref 23–29)
CO2 SERPL-SCNC: 28 MMOL/L (ref 23–29)
CO2 SERPL-SCNC: 28 MMOL/L (ref 23–29)
CO2 SERPL-SCNC: 30 MMOL/L (ref 23–29)
CO2 SERPL-SCNC: 31 MMOL/L (ref 23–29)
CREAT SERPL-MCNC: 1.7 MG/DL (ref 0.5–1.4)
CREAT SERPL-MCNC: 2.1 MG/DL (ref 0.5–1.4)
CREAT SERPL-MCNC: 2.2 MG/DL (ref 0.5–1.4)
CREAT SERPL-MCNC: 2.3 MG/DL (ref 0.5–1.4)
CREAT SERPL-MCNC: 2.4 MG/DL (ref 0.5–1.4)
DELSYS: ABNORMAL
DELSYS: ABNORMAL
DIFFERENTIAL METHOD: ABNORMAL
DOHLE BOD BLD QL SMEAR: PRESENT
EOSINOPHIL # BLD AUTO: 0 K/UL (ref 0–0.5)
EOSINOPHIL NFR BLD: 0 % (ref 0–8)
ERYTHROCYTE [DISTWIDTH] IN BLOOD BY AUTOMATED COUNT: 16.8 % (ref 11.5–14.5)
ERYTHROCYTE [SEDIMENTATION RATE] IN BLOOD BY WESTERGREN METHOD: 16 MM/H
EST. GFR  (AFRICAN AMERICAN): 26.3 ML/MIN/1.73 M^2
EST. GFR  (AFRICAN AMERICAN): 27.7 ML/MIN/1.73 M^2
EST. GFR  (AFRICAN AMERICAN): 29.3 ML/MIN/1.73 M^2
EST. GFR  (AFRICAN AMERICAN): 31 ML/MIN/1.73 M^2
EST. GFR  (AFRICAN AMERICAN): 40 ML/MIN/1.73 M^2
EST. GFR  (NON AFRICAN AMERICAN): 22.8 ML/MIN/1.73 M^2
EST. GFR  (NON AFRICAN AMERICAN): 24.1 ML/MIN/1.73 M^2
EST. GFR  (NON AFRICAN AMERICAN): 25.4 ML/MIN/1.73 M^2
EST. GFR  (NON AFRICAN AMERICAN): 26.9 ML/MIN/1.73 M^2
EST. GFR  (NON AFRICAN AMERICAN): 34.7 ML/MIN/1.73 M^2
FIO2: 28
FLOW: 2
FLOW: 2
GLUCOSE SERPL-MCNC: 110 MG/DL (ref 70–110)
GLUCOSE SERPL-MCNC: 119 MG/DL (ref 70–110)
GLUCOSE SERPL-MCNC: 172 MG/DL (ref 70–110)
GLUCOSE SERPL-MCNC: 181 MG/DL (ref 70–110)
GLUCOSE SERPL-MCNC: 188 MG/DL (ref 70–110)
HCO3 UR-SCNC: 31.4 MMOL/L (ref 24–28)
HCO3 UR-SCNC: 31.7 MMOL/L (ref 24–28)
HCT VFR BLD AUTO: 23.6 % (ref 37–48.5)
HCT VFR BLD AUTO: 25.9 % (ref 37–48.5)
HCT VFR BLD AUTO: 26.3 % (ref 37–48.5)
HGB BLD-MCNC: 7.3 G/DL (ref 12–16)
HGB BLD-MCNC: 8 G/DL (ref 12–16)
HGB BLD-MCNC: 8.2 G/DL (ref 12–16)
HYPOCHROMIA BLD QL SMEAR: ABNORMAL
IMM GRANULOCYTES # BLD AUTO: 0.02 K/UL (ref 0–0.04)
IMM GRANULOCYTES # BLD AUTO: 0.03 K/UL (ref 0–0.04)
IMM GRANULOCYTES # BLD AUTO: 0.03 K/UL (ref 0–0.04)
IMM GRANULOCYTES NFR BLD AUTO: 0.3 % (ref 0–0.5)
IMM GRANULOCYTES NFR BLD AUTO: 0.4 % (ref 0–0.5)
IMM GRANULOCYTES NFR BLD AUTO: 0.5 % (ref 0–0.5)
INR PPP: 1.4 (ref 0.8–1.2)
LYMPHOCYTES # BLD AUTO: 0.1 K/UL (ref 1–4.8)
LYMPHOCYTES # BLD AUTO: 0.1 K/UL (ref 1–4.8)
LYMPHOCYTES # BLD AUTO: 0.2 K/UL (ref 1–4.8)
LYMPHOCYTES NFR BLD: 1.4 % (ref 18–48)
LYMPHOCYTES NFR BLD: 2 % (ref 18–48)
LYMPHOCYTES NFR BLD: 2.8 % (ref 18–48)
MAGNESIUM SERPL-MCNC: 2.2 MG/DL (ref 1.6–2.6)
MAGNESIUM SERPL-MCNC: 2.5 MG/DL (ref 1.6–2.6)
MAGNESIUM SERPL-MCNC: 2.6 MG/DL (ref 1.6–2.6)
MCH RBC QN AUTO: 27.2 PG (ref 27–31)
MCH RBC QN AUTO: 27.4 PG (ref 27–31)
MCH RBC QN AUTO: 27.5 PG (ref 27–31)
MCHC RBC AUTO-ENTMCNC: 30.9 G/DL (ref 32–36)
MCHC RBC AUTO-ENTMCNC: 30.9 G/DL (ref 32–36)
MCHC RBC AUTO-ENTMCNC: 31.2 G/DL (ref 32–36)
MCV RBC AUTO: 88 FL (ref 82–98)
MCV RBC AUTO: 88 FL (ref 82–98)
MCV RBC AUTO: 89 FL (ref 82–98)
MODE: ABNORMAL
MODE: ABNORMAL
MONOCYTES # BLD AUTO: 0.2 K/UL (ref 0.3–1)
MONOCYTES # BLD AUTO: 0.2 K/UL (ref 0.3–1)
MONOCYTES # BLD AUTO: 0.3 K/UL (ref 0.3–1)
MONOCYTES NFR BLD: 2.8 % (ref 4–15)
MONOCYTES NFR BLD: 3.4 % (ref 4–15)
MONOCYTES NFR BLD: 4.3 % (ref 4–15)
NEUTROPHILS # BLD AUTO: 5.5 K/UL (ref 1.8–7.7)
NEUTROPHILS # BLD AUTO: 6.1 K/UL (ref 1.8–7.7)
NEUTROPHILS # BLD AUTO: 6.4 K/UL (ref 1.8–7.7)
NEUTROPHILS NFR BLD: 93.8 % (ref 38–73)
NEUTROPHILS NFR BLD: 93.9 % (ref 38–73)
NEUTROPHILS NFR BLD: 94.1 % (ref 38–73)
NRBC BLD-RTO: 1 /100 WBC
NRBC BLD-RTO: 1 /100 WBC
NRBC BLD-RTO: 2 /100 WBC
OVALOCYTES BLD QL SMEAR: ABNORMAL
PCO2 BLDA: 42.3 MMHG (ref 35–45)
PCO2 BLDA: 42.9 MMHG (ref 35–45)
PH SMN: 7.48 [PH] (ref 7.35–7.45)
PH SMN: 7.48 [PH] (ref 7.35–7.45)
PLATELET # BLD AUTO: 100 K/UL (ref 150–350)
PLATELET # BLD AUTO: 105 K/UL (ref 150–350)
PLATELET # BLD AUTO: 110 K/UL (ref 150–350)
PLATELET BLD QL SMEAR: ABNORMAL
PMV BLD AUTO: 10.1 FL (ref 9.2–12.9)
PMV BLD AUTO: 10.2 FL (ref 9.2–12.9)
PMV BLD AUTO: 11.3 FL (ref 9.2–12.9)
PO2 BLDA: 32 MMHG (ref 40–60)
PO2 BLDA: 34 MMHG (ref 40–60)
POC BE: 8 MMOL/L
POC BE: 8 MMOL/L
POC SATURATED O2: 67 % (ref 95–100)
POC SATURATED O2: 69 % (ref 95–100)
POC TCO2: 33 MMOL/L (ref 24–29)
POC TCO2: 33 MMOL/L (ref 24–29)
POCT GLUCOSE: 118 MG/DL (ref 70–110)
POCT GLUCOSE: 143 MG/DL (ref 70–110)
POCT GLUCOSE: 143 MG/DL (ref 70–110)
POCT GLUCOSE: 185 MG/DL (ref 70–110)
POIKILOCYTOSIS BLD QL SMEAR: SLIGHT
POLYCHROMASIA BLD QL SMEAR: ABNORMAL
POTASSIUM SERPL-SCNC: 3.1 MMOL/L (ref 3.5–5.1)
POTASSIUM SERPL-SCNC: 3.5 MMOL/L (ref 3.5–5.1)
POTASSIUM SERPL-SCNC: 3.6 MMOL/L (ref 3.5–5.1)
POTASSIUM SERPL-SCNC: 3.9 MMOL/L (ref 3.5–5.1)
POTASSIUM SERPL-SCNC: 3.9 MMOL/L (ref 3.5–5.1)
PROT SERPL-MCNC: 5 G/DL (ref 6–8.4)
PROT SERPL-MCNC: 6 G/DL (ref 6–8.4)
PROT SERPL-MCNC: 6.3 G/DL (ref 6–8.4)
PROTHROMBIN TIME: 13.4 SEC (ref 9–12.5)
RBC # BLD AUTO: 2.65 M/UL (ref 4–5.4)
RBC # BLD AUTO: 2.94 M/UL (ref 4–5.4)
RBC # BLD AUTO: 2.99 M/UL (ref 4–5.4)
SAMPLE: ABNORMAL
SAMPLE: ABNORMAL
SITE: ABNORMAL
SITE: ABNORMAL
SODIUM SERPL-SCNC: 138 MMOL/L (ref 136–145)
SODIUM SERPL-SCNC: 140 MMOL/L (ref 136–145)
SODIUM SERPL-SCNC: 141 MMOL/L (ref 136–145)
SODIUM SERPL-SCNC: 141 MMOL/L (ref 136–145)
SODIUM SERPL-SCNC: 143 MMOL/L (ref 136–145)
SP02: 98
SP02: 98
TOXIC GRANULES BLD QL SMEAR: PRESENT
WBC # BLD AUTO: 5.87 K/UL (ref 3.9–12.7)
WBC # BLD AUTO: 6.5 K/UL (ref 3.9–12.7)
WBC # BLD AUTO: 6.83 K/UL (ref 3.9–12.7)

## 2019-12-20 PROCEDURE — 99232 SBSQ HOSP IP/OBS MODERATE 35: CPT | Mod: ,,, | Performed by: NURSE PRACTITIONER

## 2019-12-20 PROCEDURE — 25000003 PHARM REV CODE 250: Performed by: PHYSICIAN ASSISTANT

## 2019-12-20 PROCEDURE — 83735 ASSAY OF MAGNESIUM: CPT | Mod: 91

## 2019-12-20 PROCEDURE — 63600175 PHARM REV CODE 636 W HCPCS: Performed by: SURGERY

## 2019-12-20 PROCEDURE — 63600175 PHARM REV CODE 636 W HCPCS: Performed by: INTERNAL MEDICINE

## 2019-12-20 PROCEDURE — 82803 BLOOD GASES ANY COMBINATION: CPT

## 2019-12-20 PROCEDURE — 25000003 PHARM REV CODE 250: Performed by: STUDENT IN AN ORGANIZED HEALTH CARE EDUCATION/TRAINING PROGRAM

## 2019-12-20 PROCEDURE — 97116 GAIT TRAINING THERAPY: CPT

## 2019-12-20 PROCEDURE — 97803 MED NUTRITION INDIV SUBSEQ: CPT

## 2019-12-20 PROCEDURE — 25000003 PHARM REV CODE 250: Performed by: SURGERY

## 2019-12-20 PROCEDURE — 80053 COMPREHEN METABOLIC PANEL: CPT | Mod: 91

## 2019-12-20 PROCEDURE — 99900035 HC TECH TIME PER 15 MIN (STAT)

## 2019-12-20 PROCEDURE — 20600001 HC STEP DOWN PRIVATE ROOM

## 2019-12-20 PROCEDURE — 63600175 PHARM REV CODE 636 W HCPCS: Performed by: PHYSICIAN ASSISTANT

## 2019-12-20 PROCEDURE — 85610 PROTHROMBIN TIME: CPT

## 2019-12-20 PROCEDURE — 80048 BASIC METABOLIC PNL TOTAL CA: CPT | Mod: 91

## 2019-12-20 PROCEDURE — 99291 PR CRITICAL CARE, E/M 30-74 MINUTES: ICD-10-PCS | Mod: ,,, | Performed by: INTERNAL MEDICINE

## 2019-12-20 PROCEDURE — 99232 PR SUBSEQUENT HOSPITAL CARE,LEVL II: ICD-10-PCS | Mod: ,,, | Performed by: NURSE PRACTITIONER

## 2019-12-20 PROCEDURE — 97535 SELF CARE MNGMENT TRAINING: CPT

## 2019-12-20 PROCEDURE — 85025 COMPLETE CBC W/AUTO DIFF WBC: CPT | Mod: 91

## 2019-12-20 PROCEDURE — C9113 INJ PANTOPRAZOLE SODIUM, VIA: HCPCS | Performed by: SURGERY

## 2019-12-20 PROCEDURE — 86359 T CELLS TOTAL COUNT: CPT

## 2019-12-20 PROCEDURE — 27000221 HC OXYGEN, UP TO 24 HOURS

## 2019-12-20 PROCEDURE — 99291 CRITICAL CARE FIRST HOUR: CPT | Mod: ,,, | Performed by: INTERNAL MEDICINE

## 2019-12-20 PROCEDURE — 25000003 PHARM REV CODE 250: Performed by: INTERNAL MEDICINE

## 2019-12-20 PROCEDURE — 94761 N-INVAS EAR/PLS OXIMETRY MLT: CPT

## 2019-12-20 RX ORDER — POTASSIUM CHLORIDE 20 MEQ/1
40 TABLET, EXTENDED RELEASE ORAL ONCE
Status: COMPLETED | OUTPATIENT
Start: 2019-12-20 | End: 2019-12-20

## 2019-12-20 RX ORDER — TACROLIMUS 1 MG/1
1 CAPSULE ORAL 2 TIMES DAILY
Status: DISCONTINUED | OUTPATIENT
Start: 2019-12-20 | End: 2019-12-21

## 2019-12-20 RX ORDER — DOBUTAMINE HYDROCHLORIDE 400 MG/100ML
2.5 INJECTION, SOLUTION INTRAVENOUS CONTINUOUS
Status: DISCONTINUED | OUTPATIENT
Start: 2019-12-20 | End: 2019-12-27

## 2019-12-20 RX ORDER — ASPIRIN 81 MG/1
81 TABLET ORAL DAILY
Status: DISCONTINUED | OUTPATIENT
Start: 2019-12-21 | End: 2019-12-30 | Stop reason: HOSPADM

## 2019-12-20 RX ORDER — NYSTATIN 100000 [USP'U]/ML
500000 SUSPENSION ORAL
Qty: 473 ML | Refills: 1 | Status: ON HOLD | OUTPATIENT
Start: 2019-12-20 | End: 2020-01-10 | Stop reason: HOSPADM

## 2019-12-20 RX ORDER — TACROLIMUS 1 MG/1
3 CAPSULE ORAL EVERY 12 HOURS
Qty: 180 CAPSULE | Refills: 11 | Status: SHIPPED | OUTPATIENT
Start: 2019-12-20 | End: 2019-12-30

## 2019-12-20 RX ORDER — PANTOPRAZOLE SODIUM 40 MG/1
40 TABLET, DELAYED RELEASE ORAL DAILY
Status: DISCONTINUED | OUTPATIENT
Start: 2019-12-20 | End: 2019-12-23

## 2019-12-20 RX ORDER — PREDNISONE 5 MG/1
15 TABLET ORAL 2 TIMES DAILY
Qty: 180 TABLET | Refills: 11 | Status: SHIPPED | OUTPATIENT
Start: 2019-12-20 | End: 2019-12-30

## 2019-12-20 RX ORDER — NYSTATIN 100000 [USP'U]/ML
500000 SUSPENSION ORAL
Qty: 473 ML | Refills: 1 | Status: SHIPPED | OUTPATIENT
Start: 2019-12-20 | End: 2019-12-20

## 2019-12-20 RX ORDER — GABAPENTIN 100 MG/1
100 CAPSULE ORAL 3 TIMES DAILY
Status: DISCONTINUED | OUTPATIENT
Start: 2019-12-20 | End: 2019-12-30 | Stop reason: HOSPADM

## 2019-12-20 RX ORDER — VALGANCICLOVIR 450 MG/1
900 TABLET, FILM COATED ORAL DAILY
Status: DISCONTINUED | OUTPATIENT
Start: 2019-12-26 | End: 2019-12-24

## 2019-12-20 RX ORDER — POTASSIUM CHLORIDE 20 MEQ/15ML
20 SOLUTION ORAL ONCE
Status: COMPLETED | OUTPATIENT
Start: 2019-12-20 | End: 2019-12-20

## 2019-12-20 RX ORDER — MYCOPHENOLATE MOFETIL 250 MG/1
1000 CAPSULE ORAL 2 TIMES DAILY
Qty: 240 CAPSULE | Refills: 11 | Status: ON HOLD | OUTPATIENT
Start: 2019-12-20 | End: 2020-01-27 | Stop reason: SDUPTHER

## 2019-12-20 RX ORDER — ZOLPIDEM TARTRATE 5 MG/1
5 TABLET ORAL NIGHTLY PRN
Status: DISCONTINUED | OUTPATIENT
Start: 2019-12-20 | End: 2019-12-30 | Stop reason: HOSPADM

## 2019-12-20 RX ORDER — MYCOPHENOLATE MOFETIL 250 MG/1
1000 CAPSULE ORAL 2 TIMES DAILY
Status: DISCONTINUED | OUTPATIENT
Start: 2019-12-20 | End: 2019-12-30 | Stop reason: HOSPADM

## 2019-12-20 RX ORDER — POTASSIUM CHLORIDE 20 MEQ/1
20 TABLET, EXTENDED RELEASE ORAL ONCE
Status: DISCONTINUED | OUTPATIENT
Start: 2019-12-20 | End: 2019-12-20

## 2019-12-20 RX ORDER — VALGANCICLOVIR 450 MG/1
900 TABLET, FILM COATED ORAL DAILY
Qty: 60 TABLET | Refills: 5 | Status: SHIPPED | OUTPATIENT
Start: 2019-12-26 | End: 2019-12-30

## 2019-12-20 RX ORDER — SULFAMETHOXAZOLE AND TRIMETHOPRIM 400; 80 MG/1; MG/1
1 TABLET ORAL DAILY
Qty: 30 TABLET | Refills: 11 | Status: ON HOLD | OUTPATIENT
Start: 2019-12-20 | End: 2020-01-27 | Stop reason: HOSPADM

## 2019-12-20 RX ORDER — VENLAFAXINE HYDROCHLORIDE 37.5 MG/1
150 CAPSULE, EXTENDED RELEASE ORAL DAILY
Status: DISCONTINUED | OUTPATIENT
Start: 2019-12-20 | End: 2019-12-30 | Stop reason: HOSPADM

## 2019-12-20 RX ADMIN — MUPIROCIN 1 G: 20 OINTMENT TOPICAL at 08:12

## 2019-12-20 RX ADMIN — NYSTATIN 500000 UNITS: 100000 SUSPENSION ORAL at 05:12

## 2019-12-20 RX ADMIN — PANTOPRAZOLE SODIUM 40 MG: 40 INJECTION, POWDER, FOR SOLUTION INTRAVENOUS at 08:12

## 2019-12-20 RX ADMIN — MYCOPHENOLATE MOFETIL 1000 MG: 250 CAPSULE ORAL at 08:12

## 2019-12-20 RX ADMIN — POTASSIUM CHLORIDE 20 MEQ: 20 SOLUTION ORAL at 04:12

## 2019-12-20 RX ADMIN — VENLAFAXINE HYDROCHLORIDE 150 MG: 37.5 CAPSULE, EXTENDED RELEASE ORAL at 10:12

## 2019-12-20 RX ADMIN — POTASSIUM CHLORIDE 40 MEQ: 1500 TABLET, EXTENDED RELEASE ORAL at 10:12

## 2019-12-20 RX ADMIN — PREDNISONE 25 MG: 10 TABLET ORAL at 08:12

## 2019-12-20 RX ADMIN — DEXTROSE 1000 MG: 5 SOLUTION INTRAVENOUS at 09:12

## 2019-12-20 RX ADMIN — FUROSEMIDE 20 MG/HR: 10 INJECTION, SOLUTION INTRAMUSCULAR; INTRAVENOUS at 03:12

## 2019-12-20 RX ADMIN — NYSTATIN 500000 UNITS: 100000 SUSPENSION ORAL at 12:12

## 2019-12-20 RX ADMIN — MIRTAZAPINE 15 MG: 15 TABLET, FILM COATED ORAL at 08:12

## 2019-12-20 RX ADMIN — FUROSEMIDE 20 MG/HR: 10 INJECTION, SOLUTION INTRAMUSCULAR; INTRAVENOUS at 12:12

## 2019-12-20 RX ADMIN — DOBUTAMINE IN DEXTROSE 5 MCG/KG/MIN: 200 INJECTION, SOLUTION INTRAVENOUS at 11:12

## 2019-12-20 RX ADMIN — NYSTATIN 500000 UNITS: 100000 SUSPENSION ORAL at 08:12

## 2019-12-20 RX ADMIN — TACROLIMUS 1 MG: 1 CAPSULE ORAL at 05:12

## 2019-12-20 RX ADMIN — GABAPENTIN 100 MG: 100 CAPSULE ORAL at 02:12

## 2019-12-20 RX ADMIN — POLYETHYLENE GLYCOL 3350 17 G: 17 POWDER, FOR SOLUTION ORAL at 08:12

## 2019-12-20 RX ADMIN — OXYCODONE HYDROCHLORIDE 5 MG: 5 TABLET ORAL at 03:12

## 2019-12-20 RX ADMIN — GABAPENTIN 100 MG: 100 CAPSULE ORAL at 08:12

## 2019-12-20 RX ADMIN — ZOLPIDEM TARTRATE 5 MG: 5 TABLET ORAL at 10:12

## 2019-12-20 RX ADMIN — ASPIRIN 325 MG ORAL TABLET 325 MG: 325 PILL ORAL at 08:12

## 2019-12-20 RX ADMIN — TACROLIMUS 1 MG: 1 CAPSULE ORAL at 10:12

## 2019-12-20 NOTE — PT/OT/SLP PROGRESS
Occupational Therapy   Treatment    Name: Deborah Navas  MRN: [id number]  Admitting Diagnosis:  Status post heart transplant  4 Days Post-Op    Recommendations:     Discharge Recommendations: home health OT  Discharge Equipment Recommendations:  none  Barriers to discharge:  None    Assessment:     Deborah Navas is a 49 y.o. female with a medical diagnosis of Status post heart transplant.   Performance deficits affecting function are impaired self care skills, impaired balance, weakness, impaired functional mobilty, impaired endurance, decreased upper extremity function, impaired cardiopulmonary response to activity.     Rehab Prognosis:  Good; patient would benefit from acute skilled OT services to address these deficits and reach maximum level of function.       Plan:     Patient to be seen 4 x/week to address the above listed problems via self-care/home management, therapeutic activities, therapeutic exercises  · Plan of Care Expires: 01/18/20  · Plan of Care Reviewed with: patient, caregiver    Subjective     Pain/Comfort:  · Pain Rating 1: 0/10  · Pain Rating Post-Intervention 1: 0/10    Objective:     Communicated with: RN prior to session.  Patient found supine with blood pressure cuff, pulse ox (continuous), telemetry, central line, parrish catheter, chest tube, oxygen(Midline) upon OT entry to room.    General Precautions: Standard, fall, sternal   Orthopedic Precautions:N/A   Braces:       Occupational Performance:     Bed Mobility:    · Patient completed Supine to Sit with contact guard assistance     Functional Mobility/Transfers:  · Patient completed Sit <> Stand Transfer with contact guard assistance  with  no assistive device   · Functional Mobility: CGA to/from bathroom    Activities of Daily Living:  · Feeding:  independence    · Grooming: independence for grooming tasks with SBA for standing balance  · Upper Body Dressing: moderate assistance    · Lower Body Dressing: minimum  assistance    · Toileting: minimum assistance        [facility] 6 Click ADL: 20    Treatment & Education:  Pt ed on OT POC  Pt re-ed on heart transplant/sternal precautions  Pt stood at sink for self-care x  4 min with SBA  Pt re-ed on ROM ex's 3x daily    Patient left up in chair with all lines intact, call button in reach, RN notified and friend presentEducation:      GOALS:   Multidisciplinary Problems     Occupational Therapy Goals        Problem: Occupational Therapy Goal    Goal Priority Disciplines Outcome Interventions   Occupational Therapy Goal     OT, PT/OT Ongoing, Progressing    Description:  Goals to be met by: 1/2/19     Patient will increase functional independence with ADLs by performing:    UE Dressing with Modified [place].  LE Dressing with Modified [place].  Grooming while standing at sink with Modified [place] .  Toileting from toilet with Modified [place] for hygiene and clothing management.   Toilet transfers to toilet with Modified [place]                       Time Tracking:     OT Date of Treatment: 12/20/19  OT Start Time: 0936  OT Stop Time: 1007  OT Total Time (min): 31 min    Billable Minutes:Self Care/Home Management 31    JAGJIT Byrne  12/20/2019

## 2019-12-20 NOTE — NURSING TRANSFER
Nursing Transfer Note      12/20/2019     Transfer To: [id number]    Transfer via wheelchair    Transfer with cardiac monitoring    Transported by RN and PCT    Medicines sent: Yes    Chart send with patient: Yes    Notified: friend    Patient reassessed at: 8082 12/20/19 (date, time)    Upon arrival to floor: cardiac monitor applied, patient oriented to room, call bell in reach and bed in lowest position    VSS at this time.

## 2019-12-20 NOTE — ASSESSMENT & PLAN NOTE
-Transplant Date 12/16/2019  -[facility]  -Transplanted by: Dr. Nuñez  -CMV Status:D-/R+   -Rejection status: Low Risk  -Hemodynamic support with: weaned epi to 0.02 mcg/kg/min IV; Will restart  at 5 and wean Epi off  -CO: 10.9, CI: 5.07 and SVR: 554;   -Current immunosuppression:Prednisone 25mg BID, MMF 1000 BID; started Prograf 1/1 today and will give second dose of Thymo given today.  First dose given 12/18 due to GLO and creat of 3.3.   -Prophylaxis- Nystatin.  Valcyte to begin on 12/26 and will start Bactrim once creat tolerates   -Chest tube management  & pacer wires per CTS team  -HM 3 implanted 9/10/19 removed during transplant.

## 2019-12-20 NOTE — PROGRESS NOTES
"[facility]  Adult Nutrition  Progress Note    SUMMARY       Recommendations  1. Encourage increased PO intake as tolerated.   2. If PO intake does not increase, recommend adding Optisource OS to all meals.   3. TSU RD to provide post-transplant diet education prior to discharge.   RD to monitor.    Goals: Patient to receive nutrition by RD follow-up  Nutrition Goal Status: goal met  Communication of RD Recs: discussed on rounds    Reason for Assessment  Reason For Assessment: RD follow-up  Diagnosis: surgery/postoperative complications(s/p OHTx 12/16)  Relevant Medical History: NICM s/p LVAD 9/2019, HLD  Interdisciplinary Rounds: attended  General Information Comments: Extubated 12/18. Started on diet yesterday, not much PO intake yet. NFPE completed 12/17, patient continues with mild wasting but does not meet criteria for malnutrition. Noted weight loss since admission but likely fluid related (patient is -58.3L since admission).  Nutrition Discharge Planning: TSU RD to provide post-transplant diet education prior to discharge.    Nutrition Risk Screen  Nutrition Risk Screen: no indicators present    Nutrition/Diet History  Patient Reported Diet/Restrictions/Preferences: low salt  Spiritual, Cultural Beliefs, Cheondoism Practices, Values that Affect Care: no  Factors Affecting Nutritional Intake: None identified at this time    Anthropometrics  Temp: 98.6 °F (37 °C)  Height Method: Measured  Height: 5' 6" (167.6 cm)  Height (inches): 66 in  Weight Method: Standard Scale  Weight: 99.8 kg (220 lb)  Weight (lb): 220 lb  Ideal Body Weight (IBW), Female: 130 lb  % Ideal Body Weight, Female (lb): 169.23 %  BMI (Calculated): 35.5  BMI Grade: 35 - 39.9 - obesity - grade II     Lab/Procedures/Meds  Pertinent Labs Reviewed: reviewed  Pertinent Labs Comments: BUN 57, Creat 2.3, Glu 172, Alb 3.4  Pertinent Medications Reviewed: reviewed  Pertinent Medications Comments: pantoprazole, prednisone, epinephrine, " lasix, cardene, levophed, vasopressin    Estimated/Assessed Needs  Weight Used For Calorie Calculations: 100.1 kg (220 lb 10.9 oz)  Energy Calorie Requirements (kcal): 1802 kcal/day  Energy Need Method: Clay-St Jeor(x 1.1)  Protein Requirements:  g/day(1.5-2.0 g/kg)  Weight Used For Protein Calculations: 59.1 kg (130 lb 4.7 oz)(IBW)  Fluid Requirements (mL): 1 mL/kcal or per MD  Estimated Fluid Requirement Method: RDA Method  RDA Method (mL): 1802    Nutrition Prescription Ordered  Current Diet Order: Cardiac  Nutrition Order Comments: 1500mL FR    Evaluation of Received Nutrient/Fluid Intake  I/O: -6.4L x 24hrs, -58.3L since admit  Comments: LBM 12/16  % Intake of Estimated Energy Needs: 25 - 50 %  % Meal Intake: 25 - 50 %    Nutrition Risk  Level of Risk/Frequency of Follow-up: high(2x/week)     Assessment and Plan  Nutrition Problem  Increased nutrient needs     Related to (etiology):   Physiological causes related to healing     Signs and Symptoms (as evidenced by):   S/p OHTx 12/16      Interventions (treatment strategy):  Collaboration of nutrition care with other providers     Nutrition Diagnosis Status:   Continues    Monitor and Evaluation  Food and Nutrient Intake: energy intake, food and beverage intake  Food and Nutrient Adminstration: diet order  Knowledge/Beliefs/Attitudes: food and nutrition knowledge/skill  Physical Activity and Function: nutrition-related ADLs and IADLs  Anthropometric Measurements: weight, weight change  Biochemical Data, Medical Tests and Procedures: electrolyte and renal panel, gastrointestinal profile, inflammatory profile  Nutrition-Focused Physical Findings: overall appearance     Malnutrition Assessment  Orbital Region (Subcutaneous Fat Loss): well nourished  Upper Arm Region (Subcutaneous Fat Loss): well nourished  Thoracic and Lumbar Region: (BERNARD, restrained)   [place] Region (Muscle Loss): mild depletion  Clavicle Bone Region (Muscle Loss): mild depletion  Clavicle  and Acromion Bone Region (Muscle Loss): well nourished  Scapular Bone Region (Muscle Loss): (BERNARD)  Dorsal Hand (Muscle Loss): well nourished  Patellar Region (Muscle Loss): well nourished  Anterior Thigh Region (Muscle Loss): well nourished  Posterior Calf Region (Muscle Loss): well nourished   Edema (Fluid Accumulation): 2-->mild     Nutrition Follow-Up  RD Follow-up?: Yes

## 2019-12-20 NOTE — PROGRESS NOTES
[facility]  Heart Transplant  Progress Note    Patient Name: Deborah Navas  MRN: [id number]  Admission Date: 11/13/2019  Hospital Length of Stay: 35 days  Attending Physician: Eric Antunez Jr.,*  Primary Care Provider: Primary Doctor No  Principal Problem:Status post heart transplant    Subjective:     Interval History: Patient received second dose of thymo yesterday.  Epi weaned to 0.02 yesterday.  She is net negative 6L in the last 24 hours.  CVP: 15, SVO2: 69, CO: 10.96, CI: 5.07, SVR: 496 using weight of 99.8kg and oxygen consumption of 349.3.  Patient given mag citrate yesterday and has not had bowel movement.  She reports she feels like she has to have one now.  Starting home dose of Ambien, Effexor and Neurontin today.  Starting  and weaning Epi off to try to get her to the TSU today    Lines:   Midline: Right cephalic vein: 12/4/19  Art Line: 12/16/19  Left IJ: 12/16/19  Pacer Wires: 12/16/19    Continuous Infusions:   DOBUTamine      epinephrine 0.02 mcg/kg/min (12/20/19 0900)    furosemide (LASIX) 2 mg/mL infusion (non-titrating) 20 mg/hr (12/20/19 0900)    niCARdipine Stopped (12/19/19 1400)    norepinephrine bitartrate-D5W Stopped (12/17/19 1300)    vasopressin (PITRESSIN) infusion Stopped (12/17/19 0830)     Scheduled Meds:   [START ON 12/21/2019] aspirin  81 mg Oral Daily    gabapentin  100 mg Oral TID    mirtazapine  15 mg Oral QHS    mupirocin  1 g Nasal BID    nystatin  500,000 Units Mouth/Throat QID (WM & HS)    pantoprazole  40 mg Oral Daily    polyethylene glycol  17 g Oral Daily    potassium chloride  20 mEq Oral Once    predniSONE  25 mg Oral BID    tacrolimus  1 mg Oral BID    [START ON 12/26/2019] valGANciclovir  900 mg Oral Daily    venlafaxine  150 mg Oral Daily     PRN Meds:sodium chloride, sodium chloride, acetaminophen, acetaminophen, bisacodyl, Dextrose 10% Bolus, Dextrose 10% Bolus, fentaNYL, glucagon (human recombinant), glucose,  glucose, insulin aspart U-100, ondansetron, ondansetron, oxyCODONE, oxyCODONE, zolpidem    Review of patient's allergies indicates:   Allergen Reactions    Adhesive Blisters     Reaction to area in chest and up only    Codeine Itching     Objective:     Vital Signs (Most Recent):  Temp: 98.6 °F (37 °C) (12/20/19 0700)  Pulse: 93 (12/20/19 0930)  Resp: (!) 22 (12/20/19 0930)  BP: (!) 123/56 (12/20/19 0930)  SpO2: 98 % (12/20/19 0930) Vital Signs (24h Range):  Temp:  [97.9 °F (36.6 °C)-98.7 °F (37.1 °C)] 98.6 °F (37 °C)  Pulse:  [] 93  Resp:  [16-42] 22  SpO2:  [93 %-100 %] 98 %  BP: (101-150)/(52-74) 123/56  Arterial Line BP: (117-139)/(51-72) 128/54     Patient Vitals for the past 72 hrs (Last 3 readings):   Weight   12/18/19 1500 99.8 kg (220 lb)     Body mass index is 35.51 kg/m².      Intake/Output Summary (Last 24 hours) at 12/20/2019 0947  Last data filed at 12/20/2019 0900  Gross per 24 hour   Intake 1587 ml   Output 7890 ml   Net -6303 ml       Hemodynamic Parameters:       Telemetry: reviewed: sinus tach: rates in 90's  Physical Exam  Constitutional: laying in bed NAD  Head: Normocephalic and atraumatic.   Eyes: Conjunctivae are normal.   Neck: Neck supple. Left IJ in place  Cardiovascular: Regular rate and rhythm; S1, S2 normal.  No rub, gallop or murmer  Pulmonary/Chest: Effort normal. Chest tubes intact. Serosanguinous drainage. Pacing wires in place.    Abdominal: Soft. There is no tenderness.   Musculoskeletal: She exhibits no deformity. Right femoral access site covered with dressing. No issues.    Neurological: She is alert.   Skin: Skin is warm and dry.   Psychiatric: Mood and affect appropriate    Nursing note and vitals reviewed.      Significant Labs:  CBC:  Recent Labs   Lab 12/20/19  0014 12/20/19  0425 12/20/19  0800   WBC 6.50 6.83 5.87   RBC 2.94* 2.99* 2.65*   HGB 8.0* 8.2* 7.3*   HCT 25.9* 26.3* 23.6*   * 110* 100*   MCV 88 88 89   MCH 27.2 27.4 27.5   MCHC 30.9* 31.2* 30.9*      BNP:  Recent Labs   Lab 12/16/19  0158   *     CMP:  Recent Labs   Lab 12/20/19  0014 12/20/19  0425 12/20/19  0800   * 172* 119*   CALCIUM 9.1 9.1 7.5*   ALBUMIN 3.2* 3.4* 2.8*   PROT 6.0 6.3 5.0*    141 143   K 3.5 3.6 3.1*   CO2 28 31* 26    99 107   BUN 57* 57* 47*   CREATININE 2.4* 2.3* 1.7*   ALKPHOS 63 61 48*   ALT 38 41 37   AST 90* 90* 76*   BILITOT 0.7 0.8 0.7      Coagulation:   Recent Labs   Lab 12/16/19  1613 12/17/19  0102 12/17/19  0459 12/18/19  0333 12/19/19  0321 12/20/19  0425   INR 1.3* 1.3* 1.3* 1.4* 1.4* 1.4*   APTT 27.4 23.1 24.5  --   --   --      LDH:  No results for input(s): LDH in the last 72 hours.  Microbiology:  Microbiology Results (last 7 days)     Procedure Component Value Units Date/Time    Urine Culture High Risk [[id number]] Collected:  12/15/19 0646    Order Status:  Completed Specimen:  Urine, Catheterized Updated:  12/16/19 0840     Urine Culture, Routine No significant growth    Narrative:       Indicated criteria for high risk culture:->Other  Other (specify):->awaiting heart transplant    Urine culture [[id number]] Collected:  12/15/19 0646    Order Status:  Canceled Specimen:  Urine, Catheterized           I have reviewed all pertinent labs within the past 24 hours.    Estimated Creatinine Clearance: 47.2 mL/min (A) (based on SCr of 1.7 mg/dL (H)).    Diagnostic Results:  I have reviewed all pertinent imaging results/findings within the past 24 hours.    Assessment and Plan:     48 yo WF with NICM, s/p HM3 implantation 9/10/19 (post up coarse uncomplicated with the exception of+ diaphragmatic stimulation of LV lead and pleural effusion with chest tube placement, atrial flutter with NADINE/DCCV), V-fib reported in setting of hypokalemia with appropriate shock from ICD on Amiodarone prior to LVAD placement; and recent hospitalizations for ventricular arrythmias; started on Mexilitine.  She was seen in EP clinic today and she continues to have  multiple VT episodes with some ATP therapy, although no ICD shocks since starting mexiletine 10/24/2019. One slow VT episode 2.5hrs 11/3/2019. Patient asymptomatic with LVAD. On coumadin. No AFL since post-op event (paced out of rhythm 9/24/2019). CHB with 100% V pacing (LV lead off). She does not feel well. Dry heaving with mexiletine. Taking phenergan PRN. 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   She was seen in HTS clinic and reported 4 low flow alarms the last 4 nights.  She reports they occur in her sleep and last for only a few seconds.  By the time she wakes up; they quickly stop.  She does report to possibly being little volume overloaded.  She hasn't noticed weight gain at home but thinks she may have little extra fluid.  She denies SOB, chest pain, palpitations, LEGER. In review of her records: she was 223lbs on 10/24/19 during previous hospitalization and is 235lbs now.  Her lasix had previously been decreased to prn.     * Status post heart transplant  -Transplant Date 12/16/2019  -hospitals Primary  -Transplanted by: Dr. Nuñez  -CMV Status:D-/R+   -Rejection status: Low Risk  -Hemodynamic support with: weaned epi to 0.02 mcg/kg/min IV; Will restart  at 5 and wean Epi off  -CO: 10.9, CI: 5.07 and SVR: 554;   -Current immunosuppression:Prednisone 25mg BID, MMF 1000 BID; started Prograf 1/1 today and will give second dose of Thymo given today.  First dose given 12/18 due to GLO and creat of 3.3.   -Prophylaxis- Nystatin.  Valcyte to begin on 12/26 and will start Bactrim once creat tolerates   -Chest tube management  & pacer wires per CTS team  -HM 3 implanted 9/10/19 removed during transplant.      GLO (acute kidney injury)  - Baseline creat was 1.3-1.6  - Acute elevation post transplant  - Will avoid nephrotoxins and monitor   - Renally dosed Neurontin to restart today    Ventricular tachycardia  - Per EP office visit on day of admission: She continues to have multiple  VT episodes with some ATP therapy, although no ICD shocks since starting mexiletine 10/24/2019. One slow VT episode 2.5hrs 11/3/2019. Patient asymptomatic with LVAD. On coumadin. No AFL since post-op event (paced out of rhythm 9/24/2019). CHB with 100% V pacing (LV lead off).   -Prior to transplant; she was continued on po Amio and Mexiletine.  She had Multiple shocks for MMVT and was reloaded on Amio and Mexiletine increased to 200     CKD (chronic kidney disease)  - Baseline creat appears 1.3-1.6.   - see above GLO      Nausea  - has intermittent nausea/vomiting, was multifactorial with RVF and mexiletine  - treated with BID PPI as well as sucralfate, and H2 blocker   - zofran and phenergan used cautiously due to hx of VT storm        ZAC Kelly  Heart Transplant  [facility]

## 2019-12-20 NOTE — ASSESSMENT & PLAN NOTE
- Baseline creat was 1.3-1.6  - Acute elevation post transplant  - Will avoid nephrotoxins and monitor   - Renally dosed Neurontin to restart today

## 2019-12-20 NOTE — SUBJECTIVE & OBJECTIVE
"Interval HPI:   Overnight events: Remains in ICU. NAEON. BG reasonably well controlled with prn correction scale. Prednisone 25 mg BID; steroids per primary.   Eatin% - variable   Nausea: No  Hypoglycemia and intervention: No  Fever: No  TPN and/or TF: No    BP (!) 123/56   Pulse 93   Temp 98.6 °F (37 °C) (Oral)   Resp (!) 22   Ht 5' 6" (1.676 m)   Wt 99.8 kg (220 lb)   LMP  (LMP Unknown)   SpO2 98%   Breastfeeding? No   BMI 35.51 kg/m²     Labs Reviewed and Include    Recent Labs   Lab 19  0800   *   CALCIUM 7.5*   ALBUMIN 2.8*   PROT 5.0*      K 3.1*   CO2 26      BUN 47*   CREATININE 1.7*   ALKPHOS 48*   ALT 37   AST 76*   BILITOT 0.7     Lab Results   Component Value Date    WBC 5.87 2019    HGB 7.3 (L) 2019    HCT 23.6 (L) 2019    MCV 89 2019     (L) 2019     No results for input(s): TSH, FREET4 in the last 168 hours.  Lab Results   Component Value Date    HGBA1C 6.5 (H) 2019       Nutritional status:   Body mass index is 35.51 kg/m².  Lab Results   Component Value Date    ALBUMIN 2.8 (L) 2019    ALBUMIN 3.4 (L) 2019    ALBUMIN 3.2 (L) 2019     Lab Results   Component Value Date    PREALBUMIN 27 2019    PREALBUMIN 28 2019    PREALBUMIN 29 2019       Estimated Creatinine Clearance: 47.2 mL/min (A) (based on SCr of 1.7 mg/dL (H)).    Accu-Checks  Recent Labs     19  0618 19  0753 19  0859 19  1201 19  1602 19  1700 19  2226 19  1145 19  1615 19  2202   POCTGLUCOSE 136* 128* 129* 187* 146* 141* 167* 160* 201* 195*       Current Medications and/or Treatments Impacting Glycemic Control  Immunotherapy:    Immunosuppressants         Stop Route Frequency     mycophenolate capsule 1,000 mg      -- Oral 2 times daily     tacrolimus capsule 1 mg      -- Oral 2 times daily        Steroids:   Hormones (From admission, onward)    Start     Stop " Route Frequency Ordered    12/18/19 2100  predniSONE tablet 25 mg      -- Oral 2 times daily 12/18/19 0948        Pressors:    Autonomic Drugs (From admission, onward)    Start     Stop Route Frequency Ordered    12/16/19 1315  EPINEPHrine (ADRENALIN) 5 mg in sodium chloride 0.9% 250 mL infusion     Question Answer Comment   Titrate by: (in mcg/kg/min) 0.01    Titrate interval: (in minutes) 5    Titrate to maintain: (SBP or MAP or Cardiac Index) SBP    Maximum dose: (in mcg/kg/min) 2        -- IV Continuous 12/16/19 1214        Hyperglycemia/Diabetes Medications:   Antihyperglycemics (From admission, onward)    Start     Stop Route Frequency Ordered    12/18/19 1700  insulin aspart U-100 pen 0-5 Units      -- SubQ Before meals & nightly PRN 12/18/19 1600

## 2019-12-20 NOTE — NURSING
Heart Transplant Coordinator Education  1. Gave written materials (books) to her and her caregiver Tanika  2. Reviewed sternal precautions  3. Reviewed handwashing, mask and avoiding crowds  4. Reviewed her wounds since the nurse was in the room. She has AICD wound, sternal wound, drive line wound, groin wound and sump sites.   5. Reviewed denervation and how it affects her positional changes    Pt was alert and sitting in chair. Tanika, caregiver, was in attendance. Also, gave pt a notebook and pen to write questions.

## 2019-12-20 NOTE — PROGRESS NOTES
Update:    BENIGNO met with pt and pt's friend Flavia in pt's room in order to provide continuity of care and to discuss pt's caregiver plan once she is discharged to the [facility]. Pt was AAOx4, pleasant, and engaged. Pt labile at times, crying and laughing, and very talkative. Pt and caregiver attribute this to steroids and poor sleep.    Flavia reports that she will act as primary caregiver when pt leaves with family sometimes coming to  to provide Flavia with breaks. She states pt's father will not be involved in pt's care at this time, even though he may come down from [place] to visit. Flavia states that she gave her job notice that she will no longer be full-time and will transition to PRN. SW provided validation and support. Flavia states that this has been something she has wanted to do for a long time.     Pt reports that overall, she is coping well and very happy with her medical progress post-heart transplant. Pt reports that kidneys are improving and she may be able to move to TSU soon.     SW provided space for pt and caregiver to ask questions/voice concerns. Pt and caregiver denying current needs at this time. SW remains available for continued psychosocial support, education, resources, and additional d/c planning as needed.

## 2019-12-20 NOTE — ASSESSMENT & PLAN NOTE
BG goal 140 - 180       BG Monitoring AC/HS   Low Dose SQ Insulin Correction Scale PRN BG excursions.       Discharge planning: TBD

## 2019-12-20 NOTE — PLAN OF CARE
Problem: Occupational Therapy Goal  Goal: Occupational Therapy Goal  Description  Goals to be met by: 1/2/19     Patient will increase functional independence with ADLs by performing:    UE Dressing with Modified [place].  LE Dressing with Modified [place].  Grooming while standing at sink with Modified [place] .  Toileting from toilet with Modified [place] for hygiene and clothing management.   Toilet transfers to toilet with Modified [place]      Outcome: Ongoing, Progressing   The above goals remain appropriate. JAGJIT Byrne  12/20/2019

## 2019-12-20 NOTE — NURSING
Transferred to [id number] from SICU. Visi monitor applied and VSS. Tele box on.  and lasix gtt infusing. CTs to suction. Skin intact. No breakdown noted. Bruising noted to L arm from previous art line. All dressings CDI. WCTM.

## 2019-12-20 NOTE — PT/OT/SLP PROGRESS
Physical Therapy Treatment    Patient Name:  Deborah Navas   MRN:  [id number]    Recommendations:     Discharge Recommendations:  home health PT   Discharge Equipment Recommendations: none   Barriers to discharge: None    Assessment:     Deborah Navas is a 49 y.o. female admitted with a medical diagnosis of Status post heart transplant.  She presents with the following impairments/functional limitations:  weakness, impaired endurance, impaired self care skills, gait instability, decreased upper extremity function, impaired cardiopulmonary response to activity. Pt progressing towards goals, but not at PLOF. Pt tolerated session well.  Pt is improving with therapy evidenced by increased gait distance.  Recommend d/c to HHPT to maximize functional independence.      Rehab Prognosis: Good; patient would benefit from acute skilled PT services to address these deficits and reach maximum level of function.    Recent Surgery: Procedure(s) (LRB):  TRANSPLANT, HEART (N/A) 4 Days Post-Op    Plan:     During this hospitalization, patient to be seen 4 x/week to address the identified rehab impairments via gait training, therapeutic activities, therapeutic exercises, neuromuscular re-education and progress toward the following goals:    · Plan of Care Expires:  01/16/20    Subjective     Chief Complaint: none reported   Patient/Family Comments/goals: to get better and return home   Pain/Comfort:  · Pain Rating 1: 0/10  · Pain Rating Post-Intervention 1: 0/10      Objective:     Communicated with RN prior to session and caregiver present in room.  Patient found HOB elevated with telemetry, pulse ox (continuous), blood pressure cuff, oxygen, central line, peripheral IV, parrish catheter, chest tube upon PT entry to room.     General Precautions: Standard, fall, sternal   Orthopedic Precautions:N/A   Braces: N/A     Functional Mobility:  · Bed Mobility:     · Scooting: contact guard assistance  · Supine to Sit:  minimum assistance  · Sit to Supine: minimum assistance  · Transfers:     · Sit to Stand:  minimum assistance with no AD; increased time from EOB  · Gait: 40ft with CGA (within room); pt used IV pole for stability  · No LOB  · Slight SOB  · Pt demo'd decreased jonathan, decreased step length, and forward flexed posture      AM-PAC 6 CLICK MOBILITY  Turning over in bed (including adjusting bedclothes, sheets and blankets)?: 3  Sitting down on and standing up from a chair with arms (e.g., wheelchair, bedside commode, etc.): 3  Moving from lying on back to sitting on the side of the bed?: 3  Moving to and from a bed to a chair (including a wheelchair)?: 3  Need to walk in hospital room?: 3  Climbing 3-5 steps with a railing?: 2  Basic Mobility Total Score: 17       Therapeutic Activities and Exercises:  Educated pt on PT role/POC  Educated pt on sternal precautions  Educated pt on importance of OOB activity and daily ambulation   Educated pt to wear a mask when leaving the room  Pt verbalized understanding    Patient left HOB elevated with all lines intact, call button in reach and RN notified..    GOALS:   Multidisciplinary Problems     Physical Therapy Goals        Problem: Physical Therapy Goal    Goal Priority Disciplines Outcome Goal Variances Interventions   Physical Therapy Goal     PT, PT/OT Ongoing, Progressing     Description:  Goals to be met by: 2020     Patient will increase functional independence with mobility by performin.Supine to Sit with CGA - not met  2. Sit to stand with Supervision - not met  3. Gait x 150ft with Supervision - not met  4. Ascend/descend 4 stairs with CGA - not met  5. Standing for 3 minutes with CGA to increase activity tolerance - not met                        Time Tracking:     PT Received On: 19  PT Start Time: 1429     PT Stop Time: 1453  PT Total Time (min): 24 min     Billable Minutes: Gait Training 23    Treatment Type: Treatment  PT/PTA: PT     PTA Visit  Number: 0     Niurka Posadas PT, DPT  12/20/2019  [phone]

## 2019-12-20 NOTE — SUBJECTIVE & OBJECTIVE
Interval History: Patient received second dose of thymo yesterday.  Epi weaned to 0.02 yesterday.  She is net negative 6L in the last 24 hours.  CVP: 15, SVO2: 69, CO: 10.96, CI: 5.07, SVR: 496 using weight of 99.8kg and oxygen consumption of 349.3.  Patient given mag citrate yesterday and has not had bowel movement.  She reports she feels like she has to have one now.  Starting home dose of Ambien, Effexor and Neurontin today.  Starting  and weaning Epi off to try to get her to the TSU today    Lines:   Midline: Right cephalic vein: 12/4/19  Art Line: 12/16/19  Left IJ: 12/16/19  Pacer Wires: 12/16/19    Continuous Infusions:   DOBUTamine      epinephrine 0.02 mcg/kg/min (12/20/19 0900)    furosemide (LASIX) 2 mg/mL infusion (non-titrating) 20 mg/hr (12/20/19 0900)    niCARdipine Stopped (12/19/19 1400)    norepinephrine bitartrate-D5W Stopped (12/17/19 1300)    vasopressin (PITRESSIN) infusion Stopped (12/17/19 0830)     Scheduled Meds:   [START ON 12/21/2019] aspirin  81 mg Oral Daily    gabapentin  100 mg Oral TID    mirtazapine  15 mg Oral QHS    mupirocin  1 g Nasal BID    nystatin  500,000 Units Mouth/Throat QID (WM & HS)    pantoprazole  40 mg Oral Daily    polyethylene glycol  17 g Oral Daily    potassium chloride  20 mEq Oral Once    predniSONE  25 mg Oral BID    tacrolimus  1 mg Oral BID    [START ON 12/26/2019] valGANciclovir  900 mg Oral Daily    venlafaxine  150 mg Oral Daily     PRN Meds:sodium chloride, sodium chloride, acetaminophen, acetaminophen, bisacodyl, Dextrose 10% Bolus, Dextrose 10% Bolus, fentaNYL, glucagon (human recombinant), glucose, glucose, insulin aspart U-100, ondansetron, ondansetron, oxyCODONE, oxyCODONE, zolpidem    Review of patient's allergies indicates:   Allergen Reactions    Adhesive Blisters     Reaction to area in chest and up only    Codeine Itching     Objective:     Vital Signs (Most Recent):  Temp: 98.6 °F (37 °C) (12/20/19 0700)  Pulse: 93  (12/20/19 0930)  Resp: (!) 22 (12/20/19 0930)  BP: (!) 123/56 (12/20/19 0930)  SpO2: 98 % (12/20/19 0930) Vital Signs (24h Range):  Temp:  [97.9 °F (36.6 °C)-98.7 °F (37.1 °C)] 98.6 °F (37 °C)  Pulse:  [] 93  Resp:  [16-42] 22  SpO2:  [93 %-100 %] 98 %  BP: (101-150)/(52-74) 123/56  Arterial Line BP: (117-139)/(51-72) 128/54     Patient Vitals for the past 72 hrs (Last 3 readings):   Weight   12/18/19 1500 99.8 kg (220 lb)     Body mass index is 35.51 kg/m².      Intake/Output Summary (Last 24 hours) at 12/20/2019 0947  Last data filed at 12/20/2019 0900  Gross per 24 hour   Intake 1587 ml   Output 7890 ml   Net -6303 ml       Hemodynamic Parameters:       Telemetry: reviewed: sinus tach: rates in 90's  Physical Exam  Constitutional: laying in bed NAD  Head: Normocephalic and atraumatic.   Eyes: Conjunctivae are normal.   Neck: Neck supple. Left IJ in place  Cardiovascular: Regular rate and rhythm; S1, S2 normal.  No rub, gallop or murmer  Pulmonary/Chest: Effort normal. Chest tubes intact. Serosanguinous drainage. Pacing wires in place.    Abdominal: Soft. There is no tenderness.   Musculoskeletal: She exhibits no deformity. Right femoral access site covered with dressing. No issues.    Neurological: She is alert.   Skin: Skin is warm and dry.   Psychiatric: Mood and affect appropriate    Nursing note and vitals reviewed.      Significant Labs:  CBC:  Recent Labs   Lab 12/20/19  0014 12/20/19  0425 12/20/19  0800   WBC 6.50 6.83 5.87   RBC 2.94* 2.99* 2.65*   HGB 8.0* 8.2* 7.3*   HCT 25.9* 26.3* 23.6*   * 110* 100*   MCV 88 88 89   MCH 27.2 27.4 27.5   MCHC 30.9* 31.2* 30.9*     BNP:  Recent Labs   Lab 12/16/19  0158   *     CMP:  Recent Labs   Lab 12/20/19  0014 12/20/19  0425 12/20/19  0800   * 172* 119*   CALCIUM 9.1 9.1 7.5*   ALBUMIN 3.2* 3.4* 2.8*   PROT 6.0 6.3 5.0*    141 143   K 3.5 3.6 3.1*   CO2 28 31* 26    99 107   BUN 57* 57* 47*   CREATININE 2.4* 2.3* 1.7*    ALKPHOS 63 61 48*   ALT 38 41 37   AST 90* 90* 76*   BILITOT 0.7 0.8 0.7      Coagulation:   Recent Labs   Lab 12/16/19  1613 12/17/19  0102 12/17/19  0459 12/18/19  0333 12/19/19  0321 12/20/19  0425   INR 1.3* 1.3* 1.3* 1.4* 1.4* 1.4*   APTT 27.4 23.1 24.5  --   --   --      LDH:  No results for input(s): LDH in the last 72 hours.  Microbiology:  Microbiology Results (last 7 days)     Procedure Component Value Units Date/Time    Urine Culture High Risk [[id number]] Collected:  12/15/19 0646    Order Status:  Completed Specimen:  Urine, Catheterized Updated:  12/16/19 0840     Urine Culture, Routine No significant growth    Narrative:       Indicated criteria for high risk culture:->Other  Other (specify):->awaiting heart transplant    Urine culture [[id number]] Collected:  12/15/19 0646    Order Status:  Canceled Specimen:  Urine, Catheterized           I have reviewed all pertinent labs within the past 24 hours.    Estimated Creatinine Clearance: 47.2 mL/min (A) (based on SCr of 1.7 mg/dL (H)).    Diagnostic Results:  I have reviewed all pertinent imaging results/findings within the past 24 hours.

## 2019-12-20 NOTE — PROGRESS NOTES
Updated Immunosuppression Plan:     Continue thymoglobulin for CNI sparing at 1.5mg/kg daily for 5 therapeutic days. Titrate to CD3 count of <50/mm3 or absolute lymphocyte count <200/mm3.     Follow written guideline for thymo dose modification:   WBC = 2-3, and/or PLT = 50-75, reduce dose by 50%  WBC <2 or PLT <50, hold dose.     CD3 currently in progress. Thymoglobulin dose held for Day3 with lymphocyte count 100. Tacrolimus initiated with 1mg twice daily (goal 10-15) pending renal function. Mycophenolate currently at goal 1000mg BID (CMV high risk).     Valcyte ordered to start on POD10 at 900mg orally daily. Bactrim SS daily to start when tolerable. Continue nystatin QID.     Previous home medications were restarted. Immunosuppression medications ordered through [facility].     Thank you,   [place], PharmD, BCPS

## 2019-12-20 NOTE — PLAN OF CARE
Problem: Adult Inpatient Plan of Care  Goal: Plan of Care Review  Outcome: Ongoing, Progressing   Patient is on 2L NC.  Gtts/Infusions:  Epi weaned to 0.02 mcg/kg/min; Lasix gtt @ 20 mg/hr.  VSS; V wire connected to external pacer (back up rate at 70 bpm).  Please see flowsheet for UOP and CT output.  K+ replaced.  Plans to transition to Dobutamine today and wean epi to off.  Safety maintained overnight; patient remained free from falls, injury, and skin breakdown.  Plan of care reviewed with the patient.  All questions and concerns were answered and addressed.

## 2019-12-20 NOTE — PLAN OF CARE
Recommendations  1. Encourage increased PO intake as tolerated.   2. If PO intake does not increase, recommend adding Optisource OS to all meals.   3. TSU RD to provide post-transplant diet education prior to discharge.   RD to monitor.    Goals: Patient to receive nutrition by RD follow-up  Nutrition Goal Status: goal met    Full assessment completed, see RD Note 12/20/2019.

## 2019-12-20 NOTE — NURSING
Dr. Hairston made aware of downtrend in patient's UOP (75 cc). Orders given to acquire a CVP (11) and an SvO2 (67). Values communicated and no new order given. WCTM.

## 2019-12-20 NOTE — NURSING
Dx: Status post heart transplant     Shift Events: VSS at this time. O2 @ 2 L NC. Afebrile. Epi off. Started on dobutamine @ 5. Patient OOB to chair with PT/OT. No issues with moving. 1 BM during shift. Potassium replaced.   Plan of care reviewed with patient and friend. Questions and concerns addressed. Will continue to monitor.     Patient transferred to [id number] with no issues.      Neuro: AAO x4, Arouses to Voice, Follows Commands and Moves All Extremities     Diet: Cardiac Diet     Gtts: Dobutamine Furosemide     Urine Output: Urinary Catheter 1575 cc/shift     Drains: Chest Tube, total output 20 cc /  shift Chest Tube (Pleural) 50 cc /shift     Labs/Accuchecks: Accuchecks ACHS.      Skin: No skin breakdown noted. Foam dressings in place. Patient turns q 2 hours independently.

## 2019-12-20 NOTE — PROGRESS NOTES
"[facility]  Endocrinology  Progress Note    Admit Date: 2019     Reason for Consult: Management of Post transplant Hyperglycemia     Surgical Procedure and Date:   Heart Transplant 2019      HPI:   Patient is a 49 y.o. female with a diagnosis of NICM, s/p HM3 implantation 9/10/19, V-fib reported in setting of hypokalemia with appropriate shock from ICD on Amiodarone prior to LVAD placement. Patient is now s/p heart transplant. Endocrinology consulted to manage hyperglycemia s/p heart surgery.     Lab Results   Component Value Date    HGBA1C 6.5 (H) 2019             Interval HPI:   Overnight events: Remains in ICU. NAEON. BG reasonably well controlled with prn correction scale. Prednisone 25 mg BID; steroids per primary.   Eatin% - variable   Nausea: No  Hypoglycemia and intervention: No  Fever: No  TPN and/or TF: No    BP (!) 123/56   Pulse 93   Temp 98.6 °F (37 °C) (Oral)   Resp (!) 22   Ht 5' 6" (1.676 m)   Wt 99.8 kg (220 lb)   LMP  (LMP Unknown)   SpO2 98%   Breastfeeding? No   BMI 35.51 kg/m²      Labs Reviewed and Include    Recent Labs   Lab 19  0800   *   CALCIUM 7.5*   ALBUMIN 2.8*   PROT 5.0*      K 3.1*   CO2 26      BUN 47*   CREATININE 1.7*   ALKPHOS 48*   ALT 37   AST 76*   BILITOT 0.7     Lab Results   Component Value Date    WBC 5.87 2019    HGB 7.3 (L) 2019    HCT 23.6 (L) 2019    MCV 89 2019     (L) 2019     No results for input(s): TSH, FREET4 in the last 168 hours.  Lab Results   Component Value Date    HGBA1C 6.5 (H) 2019       Nutritional status:   Body mass index is 35.51 kg/m².  Lab Results   Component Value Date    ALBUMIN 2.8 (L) 2019    ALBUMIN 3.4 (L) 2019    ALBUMIN 3.2 (L) 2019     Lab Results   Component Value Date    PREALBUMIN 27 2019    PREALBUMIN 28 2019    PREALBUMIN 29 2019       Estimated Creatinine Clearance: 47.2 mL/min " (A) (based on SCr of 1.7 mg/dL (H)).    Accu-Checks  Recent Labs     12/18/19  0618 12/18/19  0753 12/18/19  0859 12/18/19  1201 12/18/19  1602 12/18/19  1700 12/18/19  2226 12/19/19  1145 12/19/19  1615 12/19/19  2202   POCTGLUCOSE 136* 128* 129* 187* 146* 141* 167* 160* 201* 195*       Current Medications and/or Treatments Impacting Glycemic Control  Immunotherapy:    Immunosuppressants         Stop Route Frequency     mycophenolate capsule 1,000 mg      -- Oral 2 times daily     tacrolimus capsule 1 mg      -- Oral 2 times daily        Steroids:   Hormones (From admission, onward)    Start     Stop Route Frequency Ordered    12/18/19 2100  predniSONE tablet 25 mg      -- Oral 2 times daily 12/18/19 0948        Pressors:    Autonomic Drugs (From admission, onward)    Start     Stop Route Frequency Ordered    12/16/19 1315  EPINEPHrine (ADRENALIN) 5 mg in sodium chloride 0.9% 250 mL infusion     Question Answer Comment   Titrate by: (in mcg/kg/min) 0.01    Titrate interval: (in minutes) 5    Titrate to maintain: (SBP or MAP or Cardiac Index) SBP    Maximum dose: (in mcg/kg/min) 2        -- IV Continuous 12/16/19 1214        Hyperglycemia/Diabetes Medications:   Antihyperglycemics (From admission, onward)    Start     Stop Route Frequency Ordered    12/18/19 1700  insulin aspart U-100 pen 0-5 Units      -- SubQ Before meals & nightly PRN 12/18/19 1600          ASSESSMENT and PLAN    * Status post heart transplant  Managed per primary team  Avoid hypoglycemia        Stress hyperglycemia  BG goal 140 - 180       BG Monitoring AC/HS   Low Dose SQ Insulin Correction Scale PRN BG excursions.       Discharge planning: TBD        CKD (chronic kidney disease)  Titrate insulin slowly to avoid hypoglycemia as the risk of hypoglycemia increases with decreased creatinine clearance.        Adverse effect of adrenal cortical steroids, sequela  On steroid therapy per transplant team; may elevate BG readings            Consuelo MARTINI  OLGA Adler  Endocrinology  [facility]

## 2019-12-21 LAB
ALBUMIN SERPL BCP-MCNC: 2.9 G/DL (ref 3.5–5.2)
ALLENS TEST: ABNORMAL
ALP SERPL-CCNC: 57 U/L (ref 55–135)
ALT SERPL W/O P-5'-P-CCNC: 47 U/L (ref 10–44)
ANION GAP SERPL CALC-SCNC: 11 MMOL/L (ref 8–16)
ANION GAP SERPL CALC-SCNC: 12 MMOL/L (ref 8–16)
AST SERPL-CCNC: 71 U/L (ref 10–40)
BASOPHILS # BLD AUTO: 0 K/UL (ref 0–0.2)
BASOPHILS NFR BLD: 0 % (ref 0–1.9)
BILIRUB SERPL-MCNC: 0.8 MG/DL (ref 0.1–1)
BUN SERPL-MCNC: 51 MG/DL (ref 6–20)
BUN SERPL-MCNC: 52 MG/DL (ref 6–20)
CALCIUM SERPL-MCNC: 8.4 MG/DL (ref 8.7–10.5)
CALCIUM SERPL-MCNC: 8.8 MG/DL (ref 8.7–10.5)
CHLORIDE SERPL-SCNC: 98 MMOL/L (ref 95–110)
CHLORIDE SERPL-SCNC: 98 MMOL/L (ref 95–110)
CO2 SERPL-SCNC: 29 MMOL/L (ref 23–29)
CO2 SERPL-SCNC: 29 MMOL/L (ref 23–29)
CREAT SERPL-MCNC: 1.8 MG/DL (ref 0.5–1.4)
CREAT SERPL-MCNC: 1.9 MG/DL (ref 0.5–1.4)
DELSYS: ABNORMAL
DIFFERENTIAL METHOD: ABNORMAL
EOSINOPHIL # BLD AUTO: 0 K/UL (ref 0–0.5)
EOSINOPHIL NFR BLD: 0 % (ref 0–8)
ERYTHROCYTE [DISTWIDTH] IN BLOOD BY AUTOMATED COUNT: 16.6 % (ref 11.5–14.5)
EST. GFR  (AFRICAN AMERICAN): 34.9 ML/MIN/1.73 M^2
EST. GFR  (AFRICAN AMERICAN): 37.3 ML/MIN/1.73 M^2
EST. GFR  (NON AFRICAN AMERICAN): 30.3 ML/MIN/1.73 M^2
EST. GFR  (NON AFRICAN AMERICAN): 32.4 ML/MIN/1.73 M^2
GLUCOSE SERPL-MCNC: 109 MG/DL (ref 70–110)
GLUCOSE SERPL-MCNC: 187 MG/DL (ref 70–110)
HCO3 UR-SCNC: 35.5 MMOL/L (ref 24–28)
HCT VFR BLD AUTO: 24.9 % (ref 37–48.5)
HGB BLD-MCNC: 7.6 G/DL (ref 12–16)
IMM GRANULOCYTES # BLD AUTO: 0.04 K/UL (ref 0–0.04)
IMM GRANULOCYTES NFR BLD AUTO: 0.5 % (ref 0–0.5)
INR PPP: 1.3 (ref 0.8–1.2)
LYMPHOCYTES # BLD AUTO: 0.1 K/UL (ref 1–4.8)
LYMPHOCYTES NFR BLD: 1.5 % (ref 18–48)
MCH RBC QN AUTO: 27.3 PG (ref 27–31)
MCHC RBC AUTO-ENTMCNC: 30.5 G/DL (ref 32–36)
MCV RBC AUTO: 90 FL (ref 82–98)
MONOCYTES # BLD AUTO: 0.3 K/UL (ref 0.3–1)
MONOCYTES NFR BLD: 4.2 % (ref 4–15)
NEUTROPHILS # BLD AUTO: 6.9 K/UL (ref 1.8–7.7)
NEUTROPHILS NFR BLD: 93.8 % (ref 38–73)
NRBC BLD-RTO: 0 /100 WBC
PCO2 BLDA: 50 MMHG (ref 35–45)
PH SMN: 7.46 [PH] (ref 7.35–7.45)
PLATELET # BLD AUTO: 105 K/UL (ref 150–350)
PMV BLD AUTO: 11.5 FL (ref 9.2–12.9)
PO2 BLDA: 37 MMHG (ref 40–60)
POC BE: 12 MMOL/L
POC SATURATED O2: 72 % (ref 95–100)
POC TCO2: 37 MMOL/L (ref 24–29)
POCT GLUCOSE: 106 MG/DL (ref 70–110)
POCT GLUCOSE: 155 MG/DL (ref 70–110)
POCT GLUCOSE: 166 MG/DL (ref 70–110)
POCT GLUCOSE: 179 MG/DL (ref 70–110)
POTASSIUM SERPL-SCNC: 3.8 MMOL/L (ref 3.5–5.1)
POTASSIUM SERPL-SCNC: 4 MMOL/L (ref 3.5–5.1)
PROT SERPL-MCNC: 5.3 G/DL (ref 6–8.4)
PROTHROMBIN TIME: 12.9 SEC (ref 9–12.5)
RBC # BLD AUTO: 2.78 M/UL (ref 4–5.4)
SAMPLE: ABNORMAL
SITE: ABNORMAL
SODIUM SERPL-SCNC: 138 MMOL/L (ref 136–145)
SODIUM SERPL-SCNC: 139 MMOL/L (ref 136–145)
TACROLIMUS BLD-MCNC: 3.3 NG/ML (ref 5–15)
WBC # BLD AUTO: 7.33 K/UL (ref 3.9–12.7)

## 2019-12-21 PROCEDURE — 25000003 PHARM REV CODE 250: Performed by: PHYSICIAN ASSISTANT

## 2019-12-21 PROCEDURE — 85025 COMPLETE CBC W/AUTO DIFF WBC: CPT

## 2019-12-21 PROCEDURE — 80048 BASIC METABOLIC PNL TOTAL CA: CPT

## 2019-12-21 PROCEDURE — 99233 SBSQ HOSP IP/OBS HIGH 50: CPT | Mod: ,,, | Performed by: INTERNAL MEDICINE

## 2019-12-21 PROCEDURE — 94761 N-INVAS EAR/PLS OXIMETRY MLT: CPT

## 2019-12-21 PROCEDURE — 99900035 HC TECH TIME PER 15 MIN (STAT)

## 2019-12-21 PROCEDURE — 82803 BLOOD GASES ANY COMBINATION: CPT

## 2019-12-21 PROCEDURE — 80197 ASSAY OF TACROLIMUS: CPT

## 2019-12-21 PROCEDURE — 99233 PR SUBSEQUENT HOSPITAL CARE,LEVL III: ICD-10-PCS | Mod: ,,, | Performed by: INTERNAL MEDICINE

## 2019-12-21 PROCEDURE — 63600175 PHARM REV CODE 636 W HCPCS: Performed by: PHYSICIAN ASSISTANT

## 2019-12-21 PROCEDURE — 20600001 HC STEP DOWN PRIVATE ROOM

## 2019-12-21 PROCEDURE — 85610 PROTHROMBIN TIME: CPT

## 2019-12-21 PROCEDURE — 80053 COMPREHEN METABOLIC PANEL: CPT

## 2019-12-21 PROCEDURE — 63600175 PHARM REV CODE 636 W HCPCS: Performed by: INTERNAL MEDICINE

## 2019-12-21 PROCEDURE — 86359 T CELLS TOTAL COUNT: CPT

## 2019-12-21 RX ORDER — TACROLIMUS 1 MG/1
1 CAPSULE ORAL 2 TIMES DAILY
Status: DISCONTINUED | OUTPATIENT
Start: 2019-12-21 | End: 2019-12-22

## 2019-12-21 RX ORDER — TACROLIMUS 1 MG/1
2 CAPSULE ORAL 2 TIMES DAILY
Status: DISCONTINUED | OUTPATIENT
Start: 2019-12-21 | End: 2019-12-21

## 2019-12-21 RX ADMIN — GABAPENTIN 100 MG: 100 CAPSULE ORAL at 02:12

## 2019-12-21 RX ADMIN — MYCOPHENOLATE MOFETIL 1000 MG: 250 CAPSULE ORAL at 09:12

## 2019-12-21 RX ADMIN — FUROSEMIDE 20 MG/HR: 10 INJECTION, SOLUTION INTRAMUSCULAR; INTRAVENOUS at 09:12

## 2019-12-21 RX ADMIN — ZOLPIDEM TARTRATE 5 MG: 5 TABLET ORAL at 08:12

## 2019-12-21 RX ADMIN — TACROLIMUS 1 MG: 1 CAPSULE ORAL at 09:12

## 2019-12-21 RX ADMIN — ASPIRIN 81 MG: 81 TABLET, COATED ORAL at 09:12

## 2019-12-21 RX ADMIN — VENLAFAXINE HYDROCHLORIDE 150 MG: 37.5 CAPSULE, EXTENDED RELEASE ORAL at 09:12

## 2019-12-21 RX ADMIN — DOBUTAMINE IN DEXTROSE 5 MCG/KG/MIN: 200 INJECTION, SOLUTION INTRAVENOUS at 12:12

## 2019-12-21 RX ADMIN — PREDNISONE 25 MG: 10 TABLET ORAL at 09:12

## 2019-12-21 RX ADMIN — TACROLIMUS 1 MG: 1 CAPSULE ORAL at 05:12

## 2019-12-21 RX ADMIN — NYSTATIN 500000 UNITS: 100000 SUSPENSION ORAL at 08:12

## 2019-12-21 RX ADMIN — NYSTATIN 500000 UNITS: 100000 SUSPENSION ORAL at 05:12

## 2019-12-21 RX ADMIN — FUROSEMIDE 20 MG/HR: 10 INJECTION, SOLUTION INTRAMUSCULAR; INTRAVENOUS at 12:12

## 2019-12-21 RX ADMIN — GABAPENTIN 100 MG: 100 CAPSULE ORAL at 08:12

## 2019-12-21 RX ADMIN — DOBUTAMINE IN DEXTROSE 5 MCG/KG/MIN: 200 INJECTION, SOLUTION INTRAVENOUS at 02:12

## 2019-12-21 RX ADMIN — PREDNISONE 25 MG: 10 TABLET ORAL at 08:12

## 2019-12-21 RX ADMIN — MYCOPHENOLATE MOFETIL 1000 MG: 250 CAPSULE ORAL at 08:12

## 2019-12-21 RX ADMIN — NYSTATIN 500000 UNITS: 100000 SUSPENSION ORAL at 09:12

## 2019-12-21 RX ADMIN — MIRTAZAPINE 15 MG: 15 TABLET, FILM COATED ORAL at 08:12

## 2019-12-21 RX ADMIN — NYSTATIN 500000 UNITS: 100000 SUSPENSION ORAL at 12:12

## 2019-12-21 RX ADMIN — GABAPENTIN 100 MG: 100 CAPSULE ORAL at 09:12

## 2019-12-21 RX ADMIN — FUROSEMIDE 20 MG/HR: 10 INJECTION, SOLUTION INTRAMUSCULAR; INTRAVENOUS at 06:12

## 2019-12-21 RX ADMIN — POLYETHYLENE GLYCOL 3350 17 G: 17 POWDER, FOR SOLUTION ORAL at 09:12

## 2019-12-21 RX ADMIN — PANTOPRAZOLE SODIUM 40 MG: 40 TABLET, DELAYED RELEASE ORAL at 09:12

## 2019-12-21 NOTE — PLAN OF CARE
Pt AAOx4, bed low locked, call light within reach, wearing nonskid socks. Pt instructed to use call light for assistance, pt verbalizes understanding.   Pt denies any pain or discomfort at this time. Tele NSR/ST. AC/HS, 118, no coverage needed. Dobutamine gtt @ 5 mcg/kg/min x 100.8 kg (15.1 mL/hr). Lasix gtt @ 20 mg/ hr (10 mL/hr). L/R CT to -20 wall suction, minimal output this shift. Mid sternal incision with dressing CDI no SSI. Pacer wires grounded, external pacer at the bedside. Fonseca with 1,100 mL UOP this shift, no BM. Pt remains free from injury/harm at this time. Afebrile. See flowsheet for full assessment/VS.

## 2019-12-21 NOTE — PROGRESS NOTES
[facility]  Heart Transplant  Progress Note    Patient Name: Deborah Navas  MRN: [id number]  Admission Date: 11/13/2019  Hospital Length of Stay: 36 days  Attending Physician: Eric Antunez Jr.,*  Primary Care Provider: Primary Doctor No  Principal Problem:Status post heart transplant    Subjective:     Interval History: Epi weaned off and she was started on  5.  She had done well and has no complaints overnight.  Prograff started yesterday at 1/1.  She is negative 2L over the last 24 hours and CVP is 15 today.  CD3 count is pending this am, but lymphocyte count is 109    Lines:   Midline: Right cephalic vein: 12/4/19  Left IJ: 12/16/19  Pacer Wires: 12/16/19    Continuous Infusions:   DOBUTamine 5 mcg/kg/min (12/21/19 0030)    furosemide (LASIX) 2 mg/mL infusion (non-titrating) 20 mg/hr (12/21/19 0922)     Scheduled Meds:   aspirin  81 mg Oral Daily    gabapentin  100 mg Oral TID    mirtazapine  15 mg Oral QHS    mycophenolate  1,000 mg Oral BID    nystatin  500,000 Units Mouth/Throat QID (WM & HS)    pantoprazole  40 mg Oral Daily    polyethylene glycol  17 g Oral Daily    predniSONE  25 mg Oral BID    tacrolimus  1 mg Oral BID    [START ON 12/26/2019] valGANciclovir  900 mg Oral Daily    venlafaxine  150 mg Oral Daily     PRN Meds:sodium chloride, sodium chloride, acetaminophen, bisacodyl, Dextrose 10% Bolus, Dextrose 10% Bolus, glucagon (human recombinant), glucose, glucose, insulin aspart U-100, ondansetron, ondansetron, oxyCODONE, oxyCODONE, zolpidem    Review of patient's allergies indicates:   Allergen Reactions    Adhesive Blisters     Reaction to area in chest and up only    Codeine Itching     Objective:     Vital Signs (Most Recent):  Temp: 97.8 °F (36.6 °C) (12/21/19 0800)  Pulse: 93 (12/21/19 0800)  Resp: 12 (12/21/19 0800)  BP: 129/66 (12/21/19 0800)  SpO2: 95 % (12/21/19 0800) Vital Signs (24h Range):  Temp:  [97.8 °F (36.6 °C)-98.3 °F (36.8 °C)]  97.8 °F (36.6 °C)  Pulse:  [] 93  Resp:  [12-34] 12  SpO2:  [94 %-99 %] 95 %  BP: ()/(45-75) 129/66     Patient Vitals for the past 72 hrs (Last 3 readings):   Weight   12/21/19 0410 105.9 kg (233 lb 6.4 oz)   12/18/19 1500 99.8 kg (220 lb)     Body mass index is 37.67 kg/m².      Intake/Output Summary (Last 24 hours) at 12/21/2019 1155  Last data filed at 12/21/2019 0900  Gross per 24 hour   Intake 1210.89 ml   Output 2720 ml   Net -1509.11 ml       Hemodynamic Parameters:       Telemetry: reviewed: sinus tach: rates in 90's  Physical Exam  Constitutional: laying in bed NAD  Head: Normocephalic and atraumatic.   Eyes: Conjunctivae are normal.   Neck: Neck supple. Left IJ in place  Cardiovascular: Regular rate and rhythm; S1, S2 normal.  No rub, gallop or murmer  Pulmonary/Chest: Effort normal. Chest tubes intact. Serosanguinous drainage. Pacing wires in place.    Abdominal: Soft. There is no tenderness.   Musculoskeletal: She exhibits no deformity. Right femoral access site covered with dressing. No issues.    Neurological: She is alert.   Skin: Skin is warm and dry.   Psychiatric: Mood and affect appropriate    Nursing note and vitals reviewed.      Significant Labs:  CBC:  Recent Labs   Lab 12/20/19  0425 12/20/19  0800 12/21/19  0530   WBC 6.83 5.87 7.33   RBC 2.99* 2.65* 2.78*   HGB 8.2* 7.3* 7.6*   HCT 26.3* 23.6* 24.9*   * 100* 105*   MCV 88 89 90   MCH 27.4 27.5 27.3   MCHC 31.2* 30.9* 30.5*     BNP:  Recent Labs   Lab 12/16/19  0158   *     CMP:  Recent Labs   Lab 12/20/19  0425 12/20/19  0800 12/20/19  1210 12/20/19  1500 12/21/19  0530   * 119* 181* 110 109   CALCIUM 9.1 7.5* 9.2 9.0 8.4*   ALBUMIN 3.4* 2.8*  --   --  2.9*   PROT 6.3 5.0*  --   --  5.3*    143 138 140 138   K 3.6 3.1* 3.9 3.9 4.0   CO2 31* 26 28 30* 29   CL 99 107 98 99 98   BUN 57* 47* 54* 56* 51*   CREATININE 2.3* 1.7* 2.2* 2.1* 1.8*   ALKPHOS 61 48*  --   --  57   ALT 41 37  --   --  47*   AST 90*  76*  --   --  71*   BILITOT 0.8 0.7  --   --  0.8      Coagulation:   Recent Labs   Lab 12/16/19  1613 12/17/19  0102 12/17/19  0459  12/19/19  0321 12/20/19  0425 12/21/19  0530   INR 1.3* 1.3* 1.3*   < > 1.4* 1.4* 1.3*   APTT 27.4 23.1 24.5  --   --   --   --     < > = values in this interval not displayed.     LDH:  No results for input(s): LDH in the last 72 hours.  Microbiology:  Microbiology Results (last 7 days)     Procedure Component Value Units Date/Time    Urine Culture High Risk [[id number]] Collected:  12/15/19 0646    Order Status:  Completed Specimen:  Urine, Catheterized Updated:  12/16/19 0840     Urine Culture, Routine No significant growth    Narrative:       Indicated criteria for high risk culture:->Other  Other (specify):->awaiting heart transplant    Urine culture [[id number]] Collected:  12/15/19 0646    Order Status:  Canceled Specimen:  Urine, Catheterized           I have reviewed all pertinent labs within the past 24 hours.    Estimated Creatinine Clearance: 46 mL/min (A) (based on SCr of 1.8 mg/dL (H)).    Diagnostic Results:  I have reviewed all pertinent imaging results/findings within the past 24 hours.    Assessment and Plan:     48 yo WF with NICM, s/p HM3 implantation 9/10/19 (post up coarse uncomplicated with the exception of+ diaphragmatic stimulation of LV lead and pleural effusion with chest tube placement, atrial flutter with NADINE/DCCV), V-fib reported in setting of hypokalemia with appropriate shock from ICD on Amiodarone prior to LVAD placement; and recent hospitalizations for ventricular arrythmias; started on Mexilitine.  She was seen in EP clinic today and she continues to have multiple VT episodes with some ATP therapy, although no ICD shocks since starting mexiletine 10/24/2019. One slow VT episode 2.5hrs 11/3/2019. Patient asymptomatic with LVAD. On coumadin. No AFL since post-op event (paced out of rhythm 9/24/2019). CHB with 100% V pacing (LV lead off). She does not  feel well. Dry heaving with mexiletine. Taking phenergan PRN. 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   She was seen in HTS clinic and reported 4 low flow alarms the last 4 nights.  She reports they occur in her sleep and last for only a few seconds.  By the time she wakes up; they quickly stop.  She does report to possibly being little volume overloaded.  She hasn't noticed weight gain at home but thinks she may have little extra fluid.  She denies SOB, chest pain, palpitations, LEGER. In review of her records: she was 223lbs on 10/24/19 during previous hospitalization and is 235lbs now.  Her lasix had previously been decreased to prn.     * Status post heart transplant  -Transplant Date 12/16/2019  -[facility]  -Transplanted by: Dr. Nuñez  -CMV Status:D-/R+   -Rejection status: Low Risk  -Hemodynamic support with:  5; Epi weaned off 12/20 and HR has been stable.   -Current immunosuppression:Prednisone 25mg BID, MMF 1000 BID (CMV high risk); Prograf 1/1 (plan to increase to 2/2 tomorrow).  2Doses of Thymo given 12/18, 12/19 due to and creat of 3.3.   -Prophylaxis- Nystatin.  Valcyte to begin on 12/26 and will start Bactrim once creat tolerates   -Chest tube management ( will likely remove today)  & pacer wires per CTS team  -HM 3 implanted 9/10/19 removed during transplant.      GLO (acute kidney injury)  - Baseline creat was 1.3-1.6  - Acute elevation post transplant  - Will avoid nephrotoxins and monitor       Ventricular tachycardia  - Per EP office visit on day of admission: She continues to have multiple VT episodes with some ATP therapy, although no ICD shocks since starting mexiletine 10/24/2019. One slow VT episode 2.5hrs 11/3/2019. Patient asymptomatic with LVAD. On coumadin. No AFL since post-op event (paced out of rhythm 9/24/2019). CHB with 100% V pacing (LV lead off).   -Prior to transplant; she was continued on po Amio and Mexiletine.  She had Multiple shocks  for MMVT and was reloaded on Amio and Mexiletine increased to 200     CKD (chronic kidney disease)  - Baseline creat appears 1.3-1.6.   - see above GLO      Nausea  - has intermittent nausea/vomiting, was multifactorial with RVF and mexiletine  - treated with BID PPI as well as sucralfate, and H2 blocker   - zofran and phenergan used cautiously due to hx of VT storm        ZAC Kelly  Heart Transplant  [facility]

## 2019-12-21 NOTE — CARE UPDATE
BG goal 140-180.     Remains in TSU, NAEON. Noted creatinine 1.8.   BG well controlled without insulin. Prednisone 25 mg BID; steroids per primary. PO intake remains variable; Diet Cardiac Fluid - 1500mL     Plan:  BG monitoring ac/hs and low dose correction scale.     Discharge planning: TBD     Endocrine to continue to follow    ** Please call Endocrine for any BG related issues **

## 2019-12-21 NOTE — ASSESSMENT & PLAN NOTE
-Transplant Date 12/16/2019  -[facility]  -Transplanted by: Dr. Nuñez  -CMV Status:D-/R+   -Rejection status: Low Risk  -Hemodynamic support with:  5; Epi weaned off 12/20 and HR has been stable.   -Current immunosuppression:Prednisone 25mg BID, MMF 1000 BID (CMV high risk); Prograf 1/1 (plan to increase to 2/2 tomorrow).  2Doses of Thymo given 12/18, 12/19 due to and creat of 3.3.   -Prophylaxis- Nystatin.  Valcyte to begin on 12/26 and will start Bactrim once creat tolerates   -Chest tube management ( will likely remove today)  & pacer wires per CTS team  -HM 3 implanted 9/10/19 removed during transplant.

## 2019-12-21 NOTE — PLAN OF CARE
Pt is AAOx4; afebrile; vital signs stable. Dobutamine and lasix IV are continuous. HR maintained >90. Pacer disconnected and at bedside. All dressings changed this afternoon. Fonseca pulled at 1800. She did not want to pull it until she got back in bed where she could get up easier to the commode. Chest tubes drained 20 and 30mL sanguinous drainage. BG remain <200. She is up to the chair with assist and was up for most of the day. Pain is well controlled; she did not require any PRN meds today. Self meds taught and she will pull them tonight. Appetite is good.

## 2019-12-21 NOTE — SUBJECTIVE & OBJECTIVE
Interval History: Epi weaned off and she was started on  5.  She had done well and has no complaints overnight.  Prograff started yesterday at 1/1.  She is negative 2L over the last 24 hours and CVP is 15 today.  CD3 count is pending this am, but lymphocyte count is 109    Lines:   Midline: Right cephalic vein: 12/4/19  Left IJ: 12/16/19  Pacer Wires: 12/16/19    Continuous Infusions:   DOBUTamine 5 mcg/kg/min (12/21/19 0030)    furosemide (LASIX) 2 mg/mL infusion (non-titrating) 20 mg/hr (12/21/19 0922)     Scheduled Meds:   aspirin  81 mg Oral Daily    gabapentin  100 mg Oral TID    mirtazapine  15 mg Oral QHS    mycophenolate  1,000 mg Oral BID    nystatin  500,000 Units Mouth/Throat QID (WM & HS)    pantoprazole  40 mg Oral Daily    polyethylene glycol  17 g Oral Daily    predniSONE  25 mg Oral BID    tacrolimus  1 mg Oral BID    [START ON 12/26/2019] valGANciclovir  900 mg Oral Daily    venlafaxine  150 mg Oral Daily     PRN Meds:sodium chloride, sodium chloride, acetaminophen, bisacodyl, Dextrose 10% Bolus, Dextrose 10% Bolus, glucagon (human recombinant), glucose, glucose, insulin aspart U-100, ondansetron, ondansetron, oxyCODONE, oxyCODONE, zolpidem    Review of patient's allergies indicates:   Allergen Reactions    Adhesive Blisters     Reaction to area in chest and up only    Codeine Itching     Objective:     Vital Signs (Most Recent):  Temp: 97.8 °F (36.6 °C) (12/21/19 0800)  Pulse: 93 (12/21/19 0800)  Resp: 12 (12/21/19 0800)  BP: 129/66 (12/21/19 0800)  SpO2: 95 % (12/21/19 0800) Vital Signs (24h Range):  Temp:  [97.8 °F (36.6 °C)-98.3 °F (36.8 °C)] 97.8 °F (36.6 °C)  Pulse:  [] 93  Resp:  [12-34] 12  SpO2:  [94 %-99 %] 95 %  BP: ()/(45-75) 129/66     Patient Vitals for the past 72 hrs (Last 3 readings):   Weight   12/21/19 0410 105.9 kg (233 lb 6.4 oz)   12/18/19 1500 99.8 kg (220 lb)     Body mass index is 37.67 kg/m².      Intake/Output Summary (Last 24 hours) at  12/21/2019 1155  Last data filed at 12/21/2019 0900  Gross per 24 hour   Intake 1210.89 ml   Output 2720 ml   Net -1509.11 ml       Hemodynamic Parameters:       Telemetry: reviewed: sinus tach: rates in 90's  Physical Exam  Constitutional: laying in bed NAD  Head: Normocephalic and atraumatic.   Eyes: Conjunctivae are normal.   Neck: Neck supple. Left IJ in place  Cardiovascular: Regular rate and rhythm; S1, S2 normal.  No rub, gallop or murmer  Pulmonary/Chest: Effort normal. Chest tubes intact. Serosanguinous drainage. Pacing wires in place.    Abdominal: Soft. There is no tenderness.   Musculoskeletal: She exhibits no deformity. Right femoral access site covered with dressing. No issues.    Neurological: She is alert.   Skin: Skin is warm and dry.   Psychiatric: Mood and affect appropriate    Nursing note and vitals reviewed.      Significant Labs:  CBC:  Recent Labs   Lab 12/20/19  0425 12/20/19  0800 12/21/19  0530   WBC 6.83 5.87 7.33   RBC 2.99* 2.65* 2.78*   HGB 8.2* 7.3* 7.6*   HCT 26.3* 23.6* 24.9*   * 100* 105*   MCV 88 89 90   MCH 27.4 27.5 27.3   MCHC 31.2* 30.9* 30.5*     BNP:  Recent Labs   Lab 12/16/19  0158   *     CMP:  Recent Labs   Lab 12/20/19  0425 12/20/19  0800 12/20/19  1210 12/20/19  1500 12/21/19  0530   * 119* 181* 110 109   CALCIUM 9.1 7.5* 9.2 9.0 8.4*   ALBUMIN 3.4* 2.8*  --   --  2.9*   PROT 6.3 5.0*  --   --  5.3*    143 138 140 138   K 3.6 3.1* 3.9 3.9 4.0   CO2 31* 26 28 30* 29   CL 99 107 98 99 98   BUN 57* 47* 54* 56* 51*   CREATININE 2.3* 1.7* 2.2* 2.1* 1.8*   ALKPHOS 61 48*  --   --  57   ALT 41 37  --   --  47*   AST 90* 76*  --   --  71*   BILITOT 0.8 0.7  --   --  0.8      Coagulation:   Recent Labs   Lab 12/16/19  1613 12/17/19  0102 12/17/19  0459  12/19/19  0321 12/20/19  0425 12/21/19  0530   INR 1.3* 1.3* 1.3*   < > 1.4* 1.4* 1.3*   APTT 27.4 23.1 24.5  --   --   --   --     < > = values in this interval not displayed.     LDH:  No results  for input(s): LDH in the last 72 hours.  Microbiology:  Microbiology Results (last 7 days)     Procedure Component Value Units Date/Time    Urine Culture High Risk [[id number]] Collected:  12/15/19 0646    Order Status:  Completed Specimen:  Urine, Catheterized Updated:  12/16/19 0840     Urine Culture, Routine No significant growth    Narrative:       Indicated criteria for high risk culture:->Other  Other (specify):->awaiting heart transplant    Urine culture [[id number]] Collected:  12/15/19 0646    Order Status:  Canceled Specimen:  Urine, Catheterized           I have reviewed all pertinent labs within the past 24 hours.    Estimated Creatinine Clearance: 46 mL/min (A) (based on SCr of 1.8 mg/dL (H)).    Diagnostic Results:  I have reviewed all pertinent imaging results/findings within the past 24 hours.

## 2019-12-22 LAB
ALBUMIN SERPL BCP-MCNC: 2.8 G/DL (ref 3.5–5.2)
ALLENS TEST: ABNORMAL
ALP SERPL-CCNC: 61 U/L (ref 55–135)
ALT SERPL W/O P-5'-P-CCNC: 37 U/L (ref 10–44)
ANION GAP SERPL CALC-SCNC: 10 MMOL/L (ref 8–16)
ANION GAP SERPL CALC-SCNC: 11 MMOL/L (ref 8–16)
AST SERPL-CCNC: 34 U/L (ref 10–40)
BASOPHILS # BLD AUTO: 0 K/UL (ref 0–0.2)
BASOPHILS NFR BLD: 0 % (ref 0–1.9)
BILIRUB SERPL-MCNC: 0.7 MG/DL (ref 0.1–1)
BUN SERPL-MCNC: 46 MG/DL (ref 6–20)
BUN SERPL-MCNC: 48 MG/DL (ref 6–20)
CALCIUM SERPL-MCNC: 8.2 MG/DL (ref 8.7–10.5)
CALCIUM SERPL-MCNC: 8.6 MG/DL (ref 8.7–10.5)
CHLORIDE SERPL-SCNC: 96 MMOL/L (ref 95–110)
CHLORIDE SERPL-SCNC: 97 MMOL/L (ref 95–110)
CO2 SERPL-SCNC: 29 MMOL/L (ref 23–29)
CO2 SERPL-SCNC: 32 MMOL/L (ref 23–29)
CREAT SERPL-MCNC: 1.7 MG/DL (ref 0.5–1.4)
CREAT SERPL-MCNC: 1.8 MG/DL (ref 0.5–1.4)
DELSYS: ABNORMAL
DIFFERENTIAL METHOD: ABNORMAL
EOSINOPHIL # BLD AUTO: 0 K/UL (ref 0–0.5)
EOSINOPHIL NFR BLD: 0 % (ref 0–8)
ERYTHROCYTE [DISTWIDTH] IN BLOOD BY AUTOMATED COUNT: 16.2 % (ref 11.5–14.5)
EST. GFR  (AFRICAN AMERICAN): 37.3 ML/MIN/1.73 M^2
EST. GFR  (AFRICAN AMERICAN): 40 ML/MIN/1.73 M^2
EST. GFR  (NON AFRICAN AMERICAN): 32.4 ML/MIN/1.73 M^2
EST. GFR  (NON AFRICAN AMERICAN): 34.7 ML/MIN/1.73 M^2
GLUCOSE SERPL-MCNC: 150 MG/DL (ref 70–110)
GLUCOSE SERPL-MCNC: 196 MG/DL (ref 70–110)
HCO3 UR-SCNC: 33.7 MMOL/L (ref 24–28)
HCT VFR BLD AUTO: 25.5 % (ref 37–48.5)
HGB BLD-MCNC: 7.7 G/DL (ref 12–16)
IMM GRANULOCYTES # BLD AUTO: 0.05 K/UL (ref 0–0.04)
IMM GRANULOCYTES NFR BLD AUTO: 0.8 % (ref 0–0.5)
INR PPP: 1.4 (ref 0.8–1.2)
LYMPHOCYTES # BLD AUTO: 0.1 K/UL (ref 1–4.8)
LYMPHOCYTES NFR BLD: 1.3 % (ref 18–48)
MCH RBC QN AUTO: 26.8 PG (ref 27–31)
MCHC RBC AUTO-ENTMCNC: 30.2 G/DL (ref 32–36)
MCV RBC AUTO: 89 FL (ref 82–98)
MONOCYTES # BLD AUTO: 0.4 K/UL (ref 0.3–1)
MONOCYTES NFR BLD: 6.3 % (ref 4–15)
NEUTROPHILS # BLD AUTO: 5.9 K/UL (ref 1.8–7.7)
NEUTROPHILS NFR BLD: 91.6 % (ref 38–73)
NRBC BLD-RTO: 1 /100 WBC
PCO2 BLDA: 48.7 MMHG (ref 35–45)
PH SMN: 7.45 [PH] (ref 7.35–7.45)
PLATELET # BLD AUTO: 126 K/UL (ref 150–350)
PMV BLD AUTO: 11.3 FL (ref 9.2–12.9)
PO2 BLDA: 33 MMHG (ref 40–60)
POC BE: 10 MMOL/L
POC SATURATED O2: 66 % (ref 95–100)
POC TCO2: 35 MMOL/L (ref 24–29)
POCT GLUCOSE: 144 MG/DL (ref 70–110)
POCT GLUCOSE: 159 MG/DL (ref 70–110)
POCT GLUCOSE: 191 MG/DL (ref 70–110)
POCT GLUCOSE: 204 MG/DL (ref 70–110)
POTASSIUM SERPL-SCNC: 3.5 MMOL/L (ref 3.5–5.1)
POTASSIUM SERPL-SCNC: 3.7 MMOL/L (ref 3.5–5.1)
PROT SERPL-MCNC: 4.9 G/DL (ref 6–8.4)
PROTHROMBIN TIME: 13.5 SEC (ref 9–12.5)
RBC # BLD AUTO: 2.87 M/UL (ref 4–5.4)
SAMPLE: ABNORMAL
SITE: ABNORMAL
SODIUM SERPL-SCNC: 137 MMOL/L (ref 136–145)
SODIUM SERPL-SCNC: 138 MMOL/L (ref 136–145)
TACROLIMUS BLD-MCNC: 3.3 NG/ML (ref 5–15)
WBC # BLD AUTO: 6.4 K/UL (ref 3.9–12.7)

## 2019-12-22 PROCEDURE — 80197 ASSAY OF TACROLIMUS: CPT

## 2019-12-22 PROCEDURE — 63600175 PHARM REV CODE 636 W HCPCS: Performed by: PHYSICIAN ASSISTANT

## 2019-12-22 PROCEDURE — 99233 SBSQ HOSP IP/OBS HIGH 50: CPT | Mod: ,,, | Performed by: INTERNAL MEDICINE

## 2019-12-22 PROCEDURE — 25000003 PHARM REV CODE 250: Performed by: PHYSICIAN ASSISTANT

## 2019-12-22 PROCEDURE — 85610 PROTHROMBIN TIME: CPT

## 2019-12-22 PROCEDURE — 99900035 HC TECH TIME PER 15 MIN (STAT)

## 2019-12-22 PROCEDURE — 99233 PR SUBSEQUENT HOSPITAL CARE,LEVL III: ICD-10-PCS | Mod: ,,, | Performed by: INTERNAL MEDICINE

## 2019-12-22 PROCEDURE — 82803 BLOOD GASES ANY COMBINATION: CPT

## 2019-12-22 PROCEDURE — 20600001 HC STEP DOWN PRIVATE ROOM

## 2019-12-22 PROCEDURE — 99232 SBSQ HOSP IP/OBS MODERATE 35: CPT | Mod: ,,, | Performed by: NURSE PRACTITIONER

## 2019-12-22 PROCEDURE — 80048 BASIC METABOLIC PNL TOTAL CA: CPT

## 2019-12-22 PROCEDURE — 99232 PR SUBSEQUENT HOSPITAL CARE,LEVL II: ICD-10-PCS | Mod: ,,, | Performed by: NURSE PRACTITIONER

## 2019-12-22 PROCEDURE — 86359 T CELLS TOTAL COUNT: CPT

## 2019-12-22 PROCEDURE — 80053 COMPREHEN METABOLIC PANEL: CPT

## 2019-12-22 PROCEDURE — 85025 COMPLETE CBC W/AUTO DIFF WBC: CPT

## 2019-12-22 PROCEDURE — 94761 N-INVAS EAR/PLS OXIMETRY MLT: CPT

## 2019-12-22 RX ORDER — TACROLIMUS 1 MG/1
2 CAPSULE ORAL 2 TIMES DAILY
Status: DISCONTINUED | OUTPATIENT
Start: 2019-12-22 | End: 2019-12-23

## 2019-12-22 RX ADMIN — PREDNISONE 25 MG: 10 TABLET ORAL at 08:12

## 2019-12-22 RX ADMIN — DOBUTAMINE IN DEXTROSE 5 MCG/KG/MIN: 200 INJECTION, SOLUTION INTRAVENOUS at 08:12

## 2019-12-22 RX ADMIN — PANTOPRAZOLE SODIUM 40 MG: 40 TABLET, DELAYED RELEASE ORAL at 08:12

## 2019-12-22 RX ADMIN — GABAPENTIN 100 MG: 100 CAPSULE ORAL at 08:12

## 2019-12-22 RX ADMIN — MYCOPHENOLATE MOFETIL 1000 MG: 250 CAPSULE ORAL at 08:12

## 2019-12-22 RX ADMIN — POLYETHYLENE GLYCOL 3350 17 G: 17 POWDER, FOR SOLUTION ORAL at 08:12

## 2019-12-22 RX ADMIN — VENLAFAXINE HYDROCHLORIDE 150 MG: 37.5 CAPSULE, EXTENDED RELEASE ORAL at 08:12

## 2019-12-22 RX ADMIN — ZOLPIDEM TARTRATE 5 MG: 5 TABLET ORAL at 10:12

## 2019-12-22 RX ADMIN — TACROLIMUS 2 MG: 1 CAPSULE ORAL at 05:12

## 2019-12-22 RX ADMIN — NYSTATIN 500000 UNITS: 100000 SUSPENSION ORAL at 08:12

## 2019-12-22 RX ADMIN — NYSTATIN 500000 UNITS: 100000 SUSPENSION ORAL at 11:12

## 2019-12-22 RX ADMIN — FUROSEMIDE 20 MG/HR: 10 INJECTION, SOLUTION INTRAMUSCULAR; INTRAVENOUS at 12:12

## 2019-12-22 RX ADMIN — TACROLIMUS 2 MG: 1 CAPSULE ORAL at 08:12

## 2019-12-22 RX ADMIN — MIRTAZAPINE 15 MG: 15 TABLET, FILM COATED ORAL at 08:12

## 2019-12-22 RX ADMIN — GABAPENTIN 100 MG: 100 CAPSULE ORAL at 02:12

## 2019-12-22 RX ADMIN — DOBUTAMINE IN DEXTROSE 5 MCG/KG/MIN: 200 INJECTION, SOLUTION INTRAVENOUS at 05:12

## 2019-12-22 RX ADMIN — INSULIN ASPART 2 UNITS: 100 INJECTION, SOLUTION INTRAVENOUS; SUBCUTANEOUS at 11:12

## 2019-12-22 RX ADMIN — FUROSEMIDE 20 MG/HR: 10 INJECTION, SOLUTION INTRAMUSCULAR; INTRAVENOUS at 05:12

## 2019-12-22 RX ADMIN — ASPIRIN 81 MG: 81 TABLET, COATED ORAL at 08:12

## 2019-12-22 RX ADMIN — FUROSEMIDE 20 MG/HR: 10 INJECTION, SOLUTION INTRAMUSCULAR; INTRAVENOUS at 10:12

## 2019-12-22 RX ADMIN — NYSTATIN 500000 UNITS: 100000 SUSPENSION ORAL at 05:12

## 2019-12-22 NOTE — SUBJECTIVE & OBJECTIVE
Interval History: Patient with no new complaints today.  She is eager to get chest tubes removed; CTS aware.  Prograf increased to 2/2 this am.  Absolute Lymphocyte count 83 so will not dose Thymo today.      Lines:   Midline: Right cephalic vein: 12/4/19  Left IJ: 12/16/19  Pacer Wires: 12/16/19    Continuous Infusions:   DOBUTamine 5 mcg/kg/min (12/22/19 0535)    furosemide (LASIX) 2 mg/mL infusion (non-titrating) 20 mg/hr (12/22/19 0535)     Scheduled Meds:   aspirin  81 mg Oral Daily    gabapentin  100 mg Oral TID    mirtazapine  15 mg Oral QHS    mycophenolate  1,000 mg Oral BID    nystatin  500,000 Units Mouth/Throat QID (WM & HS)    pantoprazole  40 mg Oral Daily    polyethylene glycol  17 g Oral Daily    predniSONE  25 mg Oral BID    tacrolimus  2 mg Oral BID    [START ON 12/26/2019] valGANciclovir  900 mg Oral Daily    venlafaxine  150 mg Oral Daily     PRN Meds:sodium chloride, sodium chloride, acetaminophen, bisacodyl, Dextrose 10% Bolus, Dextrose 10% Bolus, glucagon (human recombinant), glucose, glucose, insulin aspart U-100, ondansetron, ondansetron, oxyCODONE, oxyCODONE, zolpidem    Review of patient's allergies indicates:   Allergen Reactions    Adhesive Blisters     Reaction to area in chest and up only    Codeine Itching     Objective:     Vital Signs (Most Recent):  Temp: 97.7 °F (36.5 °C) (12/22/19 0745)  Pulse: 94 (12/22/19 0745)  Resp: 14 (12/22/19 0745)  BP: 112/64 (12/22/19 0745)  SpO2: 97 % (12/22/19 0745) Vital Signs (24h Range):  Temp:  [97.3 °F (36.3 °C)-97.8 °F (36.6 °C)] 97.7 °F (36.5 °C)  Pulse:  [87-97] 94  Resp:  [13-19] 14  SpO2:  [95 %-98 %] 97 %  BP: (112-137)/(61-79) 112/64     Patient Vitals for the past 72 hrs (Last 3 readings):   Weight   12/21/19 0410 105.9 kg (233 lb 6.4 oz)     Body mass index is 37.67 kg/m².      Intake/Output Summary (Last 24 hours) at 12/22/2019 1141  Last data filed at 12/22/2019 1000  Gross per 24 hour   Intake 2068.82 ml   Output 2475 ml    Net -406.18 ml       Hemodynamic Parameters:       Telemetry: reviewed: sinus tach: rates in 90's  Physical Exam  Constitutional: laying in bed NAD  Head: Normocephalic and atraumatic.   Eyes: Conjunctivae are normal.   Neck: Neck supple. Left IJ in place  Cardiovascular: Regular rate and rhythm; S1, S2 normal.  No rub, gallop or murmer  Pulmonary/Chest: Effort normal. Chest tubes intact. Serosanguinous drainage. Pacing wires in place.    Abdominal: Soft. There is no tenderness.   Musculoskeletal: She exhibits no deformity. Right femoral access site covered with dressing. No issues.    Neurological: She is alert.   Skin: Skin is warm and dry.   Psychiatric: Mood and affect appropriate    Nursing note and vitals reviewed.      Significant Labs:  CBC:  Recent Labs   Lab 12/20/19  0800 12/21/19  0530 12/22/19  0530   WBC 5.87 7.33 6.40   RBC 2.65* 2.78* 2.87*   HGB 7.3* 7.6* 7.7*   HCT 23.6* 24.9* 25.5*   * 105* 126*   MCV 89 90 89   MCH 27.5 27.3 26.8*   MCHC 30.9* 30.5* 30.2*     BNP:  Recent Labs   Lab 12/16/19  0158   *     CMP:  Recent Labs   Lab 12/20/19  0800  12/21/19  0530 12/21/19  1429 12/22/19  0530   *   < > 109 187* 150*   CALCIUM 7.5*   < > 8.4* 8.8 8.6*   ALBUMIN 2.8*  --  2.9*  --  2.8*   PROT 5.0*  --  5.3*  --  4.9*      < > 138 139 138   K 3.1*   < > 4.0 3.8 3.7   CO2 26   < > 29 29 32*      < > 98 98 96   BUN 47*   < > 51* 52* 48*   CREATININE 1.7*   < > 1.8* 1.9* 1.7*   ALKPHOS 48*  --  57  --  61   ALT 37  --  47*  --  37   AST 76*  --  71*  --  34   BILITOT 0.7  --  0.8  --  0.7    < > = values in this interval not displayed.      Coagulation:   Recent Labs   Lab 12/16/19  1613 12/17/19  0102 12/17/19  0459  12/20/19  0425 12/21/19  0530 12/22/19  0530   INR 1.3* 1.3* 1.3*   < > 1.4* 1.3* 1.4*   APTT 27.4 23.1 24.5  --   --   --   --     < > = values in this interval not displayed.     LDH:  No results for input(s): LDH in the last 72  hours.  Microbiology:  Microbiology Results (last 7 days)     Procedure Component Value Units Date/Time    Urine Culture High Risk [[id number]] Collected:  12/15/19 0646    Order Status:  Completed Specimen:  Urine, Catheterized Updated:  12/16/19 0840     Urine Culture, Routine No significant growth    Narrative:       Indicated criteria for high risk culture:->Other  Other (specify):->awaiting heart transplant          I have reviewed all pertinent labs within the past 24 hours.    Estimated Creatinine Clearance: 48.7 mL/min (A) (based on SCr of 1.7 mg/dL (H)).    Diagnostic Results:  I have reviewed all pertinent imaging results/findings within the past 24 hours.

## 2019-12-22 NOTE — PROGRESS NOTES
[facility]  Heart Transplant  Progress Note    Patient Name: Deborah Navas  MRN: [id number]  Admission Date: 11/13/2019  Hospital Length of Stay: 37 days  Attending Physician: Eric Antunez Jr.,*  Primary Care Provider: Primary Doctor No  Principal Problem:Status post heart transplant    Subjective:     Interval History: Patient with no new complaints today.  She is eager to get chest tubes removed; CTS aware.  Prograf increased to 2/2 this am.  Absolute Lymphocyte count 83 so will not dose Thymo today.      Lines:   Midline: Right cephalic vein: 12/4/19  Left IJ: 12/16/19  Pacer Wires: 12/16/19    Continuous Infusions:   DOBUTamine 5 mcg/kg/min (12/22/19 0535)    furosemide (LASIX) 2 mg/mL infusion (non-titrating) 20 mg/hr (12/22/19 0535)     Scheduled Meds:   aspirin  81 mg Oral Daily    gabapentin  100 mg Oral TID    mirtazapine  15 mg Oral QHS    mycophenolate  1,000 mg Oral BID    nystatin  500,000 Units Mouth/Throat QID (WM & HS)    pantoprazole  40 mg Oral Daily    polyethylene glycol  17 g Oral Daily    predniSONE  25 mg Oral BID    tacrolimus  2 mg Oral BID    [START ON 12/26/2019] valGANciclovir  900 mg Oral Daily    venlafaxine  150 mg Oral Daily     PRN Meds:sodium chloride, sodium chloride, acetaminophen, bisacodyl, Dextrose 10% Bolus, Dextrose 10% Bolus, glucagon (human recombinant), glucose, glucose, insulin aspart U-100, ondansetron, ondansetron, oxyCODONE, oxyCODONE, zolpidem    Review of patient's allergies indicates:   Allergen Reactions    Adhesive Blisters     Reaction to area in chest and up only    Codeine Itching     Objective:     Vital Signs (Most Recent):  Temp: 97.7 °F (36.5 °C) (12/22/19 0745)  Pulse: 94 (12/22/19 0745)  Resp: 14 (12/22/19 0745)  BP: 112/64 (12/22/19 0745)  SpO2: 97 % (12/22/19 0745) Vital Signs (24h Range):  Temp:  [97.3 °F (36.3 °C)-97.8 °F (36.6 °C)] 97.7 °F (36.5 °C)  Pulse:  [87-97] 94  Resp:  [13-19] 14  SpO2:  [95  %-98 %] 97 %  BP: (112-137)/(61-79) 112/64     Patient Vitals for the past 72 hrs (Last 3 readings):   Weight   12/21/19 0410 105.9 kg (233 lb 6.4 oz)     Body mass index is 37.67 kg/m².      Intake/Output Summary (Last 24 hours) at 12/22/2019 1141  Last data filed at 12/22/2019 1000  Gross per 24 hour   Intake 2068.82 ml   Output 2475 ml   Net -406.18 ml       Hemodynamic Parameters:       Telemetry: reviewed: sinus tach: rates in 90's  Physical Exam  Constitutional: laying in bed NAD  Head: Normocephalic and atraumatic.   Eyes: Conjunctivae are normal.   Neck: Neck supple. Left IJ in place  Cardiovascular: Regular rate and rhythm; S1, S2 normal.  No rub, gallop or murmer  Pulmonary/Chest: Effort normal. Chest tubes intact. Serosanguinous drainage. Pacing wires in place.    Abdominal: Soft. There is no tenderness.   Musculoskeletal: She exhibits no deformity. Right femoral access site covered with dressing. No issues.    Neurological: She is alert.   Skin: Skin is warm and dry.   Psychiatric: Mood and affect appropriate    Nursing note and vitals reviewed.      Significant Labs:  CBC:  Recent Labs   Lab 12/20/19  0800 12/21/19  0530 12/22/19  0530   WBC 5.87 7.33 6.40   RBC 2.65* 2.78* 2.87*   HGB 7.3* 7.6* 7.7*   HCT 23.6* 24.9* 25.5*   * 105* 126*   MCV 89 90 89   MCH 27.5 27.3 26.8*   MCHC 30.9* 30.5* 30.2*     BNP:  Recent Labs   Lab 12/16/19  0158   *     CMP:  Recent Labs   Lab 12/20/19  0800  12/21/19  0530 12/21/19  1429 12/22/19  0530   *   < > 109 187* 150*   CALCIUM 7.5*   < > 8.4* 8.8 8.6*   ALBUMIN 2.8*  --  2.9*  --  2.8*   PROT 5.0*  --  5.3*  --  4.9*      < > 138 139 138   K 3.1*   < > 4.0 3.8 3.7   CO2 26   < > 29 29 32*      < > 98 98 96   BUN 47*   < > 51* 52* 48*   CREATININE 1.7*   < > 1.8* 1.9* 1.7*   ALKPHOS 48*  --  57  --  61   ALT 37  --  47*  --  37   AST 76*  --  71*  --  34   BILITOT 0.7  --  0.8  --  0.7    < > = values in this interval not displayed.       Coagulation:   Recent Labs   Lab 12/16/19  1613 12/17/19  0102 12/17/19  0459  12/20/19  0425 12/21/19  0530 12/22/19  0530   INR 1.3* 1.3* 1.3*   < > 1.4* 1.3* 1.4*   APTT 27.4 23.1 24.5  --   --   --   --     < > = values in this interval not displayed.     LDH:  No results for input(s): LDH in the last 72 hours.  Microbiology:  Microbiology Results (last 7 days)     Procedure Component Value Units Date/Time    Urine Culture High Risk [[id number]] Collected:  12/15/19 0646    Order Status:  Completed Specimen:  Urine, Catheterized Updated:  12/16/19 0840     Urine Culture, Routine No significant growth    Narrative:       Indicated criteria for high risk culture:->Other  Other (specify):->awaiting heart transplant          I have reviewed all pertinent labs within the past 24 hours.    Estimated Creatinine Clearance: 48.7 mL/min (A) (based on SCr of 1.7 mg/dL (H)).    Diagnostic Results:  I have reviewed all pertinent imaging results/findings within the past 24 hours.    Assessment and Plan:     48 yo WF with NICM, s/p HM3 implantation 9/10/19 (post up coarse uncomplicated with the exception of+ diaphragmatic stimulation of LV lead and pleural effusion with chest tube placement, atrial flutter with NADINE/DCCV), V-fib reported in setting of hypokalemia with appropriate shock from ICD on Amiodarone prior to LVAD placement; and recent hospitalizations for ventricular arrythmias; started on Mexilitine.  She was seen in EP clinic today and she continues to have multiple VT episodes with some ATP therapy, although no ICD shocks since starting mexiletine 10/24/2019. One slow VT episode 2.5hrs 11/3/2019. Patient asymptomatic with LVAD. On coumadin. No AFL since post-op event (paced out of rhythm 9/24/2019). CHB with 100% V pacing (LV lead off). She does not feel well. Dry heaving with mexiletine. Taking phenergan PRN. 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   She was seen in HTS  clinic and reported 4 low flow alarms the last 4 nights.  She reports they occur in her sleep and last for only a few seconds.  By the time she wakes up; they quickly stop.  She does report to possibly being little volume overloaded.  She hasn't noticed weight gain at home but thinks she may have little extra fluid.  She denies SOB, chest pain, palpitations, LEGER. In review of her records: she was 223lbs on 10/24/19 during previous hospitalization and is 235lbs now.  Her lasix had previously been decreased to prn.     * Status post heart transplant  -Transplant Date 12/16/2019  -HTS Primary  -Transplanted by: Dr. Nuñez  -CMV Status:D-/R+   -Rejection status: Low Risk  -Hemodynamic support with:  5; Epi weaned off 12/20 and HR has been stable.   -Current immunosuppression:Prednisone 25mg BID, MMF 1000 BID (CMV high risk); Prograf 2/2.  2Doses of Thymo given 12/18, 12/19 due to and creat of 3.3.  Absolute lymphocyte count 83 so will not re dose.    -Prophylaxis- Nystatin.  Valcyte to begin on 12/26 and will start Bactrim once creat tolerates   -Chest tube management ( will likely remove today)  & pacer wires per CTS team  -HM 3 implanted 9/10/19 removed during transplant.      GLO (acute kidney injury)  - Baseline creat was 1.3-1.6  - Acute elevation post transplant  - Will avoid nephrotoxins and monitor       Ventricular tachycardia  - Per EP office visit on day of admission: She continues to have multiple VT episodes with some ATP therapy, although no ICD shocks since starting mexiletine 10/24/2019. One slow VT episode 2.5hrs 11/3/2019. Patient asymptomatic with LVAD. On coumadin. No AFL since post-op event (paced out of rhythm 9/24/2019). CHB with 100% V pacing (LV lead off).   -Prior to transplant; she was continued on po Amio and Mexiletine.  She had Multiple shocks for MMVT and was reloaded on Amio and Mexiletine increased to 200     CKD (chronic kidney disease)  - Baseline creat appears 1.3-1.6.   - see  above GLO      Nausea  - has intermittent nausea/vomiting, was multifactorial with RVF and mexiletine  - treated with BID PPI as well as sucralfate, and H2 blocker   - zofran and phenergan used cautiously due to hx of VT storm        ZAC Kelly  Heart Transplant  [facility]

## 2019-12-22 NOTE — PLAN OF CARE
Pt is AAOx3.Pt needs stand by assistance only. Pt able to perform all ADL's without assistance. CBG monitored AC HS.  SS coverage given when appropriate.Telly monitoring on going. NAD noted.Pt denies pian and/or discomfort at this time.Standard precautions maintained.  No breakdown noted, po fluids encouraged.Pt turns independently, pt is aware of bony area and pressure reduction positions V/S stable, within normal limits.Pt remains injury and fall free, non skid footwear donned, call light within reach, personal items within reach, bed in low/locked position, pt able to voice needs all needs voiced have been met at this time.

## 2019-12-22 NOTE — ASSESSMENT & PLAN NOTE
-Transplant Date 12/16/2019  -\[facility] [place] [facility]\"" Primary  -Transplanted by: Dr. Nuñez  -CMV Status:D-/R+   -Rejection status: Low Risk  -Hemodynamic support with:  5; Epi weaned off 12/20 and HR has been stable.   -Current immunosuppression:Prednisone 25mg BID, MMF 1000 BID (CMV high risk); Prograf 2/2.  2Doses of Thymo given 12/18, 12/19 due to and creat of 3.3.  Absolute lymphocyte count 83 so will not re dose.    -Prophylaxis- Nystatin.  Valcyte to begin on 12/26 and will start Bactrim once creat tolerates   -Chest tube management ( will likely remove today)  & pacer wires per CTS team  -HM 3 implanted 9/10/19 removed during transplant.

## 2019-12-22 NOTE — PROGRESS NOTES
"[facility]  Endocrinology  Progress Note    Admit Date: 2019     Reason for Consult: Management of Post transplant Hyperglycemia     Surgical Procedure and Date:   Heart Transplant 2019      HPI:   Patient is a 49 y.o. female with a diagnosis of NICM, s/p HM3 implantation 9/10/19, V-fib reported in setting of hypokalemia with appropriate shock from ICD on Amiodarone prior to LVAD placement. Patient is now s/p heart transplant. Endocrinology consulted to manage hyperglycemia s/p heart surgery.     Lab Results   Component Value Date    HGBA1C 6.5 (H) 2019             Interval HPI:   Overnight events: Remains in TSU. NAEON. BG well controlled without insulin. Prednisone 25 mg BID; steroids per primary. Noted creatinine of 1.7.   Eatin%  Nausea: No  Hypoglycemia and intervention: No  Fever: No  TPN and/or TF: No    BP (!) 106/51   Pulse 94   Temp 97.6 °F (36.4 °C) (Oral)   Resp 14   Ht 5' 6" (1.676 m)   Wt 105.9 kg (233 lb 6.4 oz)   LMP  (LMP Unknown)   SpO2 97%   Breastfeeding? No   BMI 37.67 kg/m²      Labs Reviewed and Include    Recent Labs   Lab 19  0530   *   CALCIUM 8.6*   ALBUMIN 2.8*   PROT 4.9*      K 3.7   CO2 32*   CL 96   BUN 48*   CREATININE 1.7*   ALKPHOS 61   ALT 37   AST 34   BILITOT 0.7     Lab Results   Component Value Date    WBC 6.40 2019    HGB 7.7 (L) 2019    HCT 25.5 (L) 2019    MCV 89 2019     (L) 2019     No results for input(s): TSH, FREET4 in the last 168 hours.  Lab Results   Component Value Date    HGBA1C 6.5 (H) 2019       Nutritional status:   Body mass index is 37.67 kg/m².  Lab Results   Component Value Date    ALBUMIN 2.8 (L) 2019    ALBUMIN 2.9 (L) 2019    ALBUMIN 2.8 (L) 2019     Lab Results   Component Value Date    PREALBUMIN 27 2019    PREALBUMIN 28 2019    PREALBUMIN 29 2019       Estimated Creatinine Clearance: 48.7 mL/min (A) " (based on SCr of 1.7 mg/dL (H)).    Accu-Checks  Recent Labs     12/19/19  2202 12/20/19  0805 12/20/19  1106 12/20/19  1724 12/20/19  2046 12/21/19  0813 12/21/19  1219 12/21/19  1648 12/21/19  2053 12/22/19  0746   POCTGLUCOSE 195* 143* 185* 143* 118* 106 166* 179* 155* 144*       Current Medications and/or Treatments Impacting Glycemic Control  Immunotherapy:    Immunosuppressants         Stop Route Frequency     tacrolimus capsule 2 mg      -- Oral 2 times daily     mycophenolate capsule 1,000 mg      -- Oral 2 times daily        Steroids:   Hormones (From admission, onward)    Start     Stop Route Frequency Ordered    12/18/19 2100  predniSONE tablet 25 mg      -- Oral 2 times daily 12/18/19 0948        Pressors:    Autonomic Drugs (From admission, onward)    None        Hyperglycemia/Diabetes Medications:   Antihyperglycemics (From admission, onward)    Start     Stop Route Frequency Ordered    12/18/19 1700  insulin aspart U-100 pen 0-5 Units      -- SubQ Before meals & nightly PRN 12/18/19 1600          ASSESSMENT and PLAN    * Status post heart transplant  Managed per primary team  Avoid hypoglycemia        Stress hyperglycemia  BG goal 140 - 180       BG Monitoring AC/HS   Low Dose SQ Insulin Correction Scale PRN BG excursions.       Discharge planning: TBD        CKD (chronic kidney disease)  Titrate insulin slowly to avoid hypoglycemia as the risk of hypoglycemia increases with decreased creatinine clearance.        Adverse effect of adrenal cortical steroids, sequela  On steroid therapy per transplant team; may elevate BG readings            Consuelo Adler NP  Endocrinology  [facility]

## 2019-12-22 NOTE — SUBJECTIVE & OBJECTIVE
"Interval HPI:   Overnight events: Remains in TSU. NAEON. BG well controlled without insulin. Prednisone 25 mg BID; steroids per primary. Noted creatinine of 1.7.   Eatin%  Nausea: No  Hypoglycemia and intervention: No  Fever: No  TPN and/or TF: No    BP (!) 106/51   Pulse 94   Temp 97.6 °F (36.4 °C) (Oral)   Resp 14   Ht 5' 6" (1.676 m)   Wt 105.9 kg (233 lb 6.4 oz)   LMP  (LMP Unknown)   SpO2 97%   Breastfeeding? No   BMI 37.67 kg/m²     Labs Reviewed and Include    Recent Labs   Lab 19  0530   *   CALCIUM 8.6*   ALBUMIN 2.8*   PROT 4.9*      K 3.7   CO2 32*   CL 96   BUN 48*   CREATININE 1.7*   ALKPHOS 61   ALT 37   AST 34   BILITOT 0.7     Lab Results   Component Value Date    WBC 6.40 2019    HGB 7.7 (L) 2019    HCT 25.5 (L) 2019    MCV 89 2019     (L) 2019     No results for input(s): TSH, FREET4 in the last 168 hours.  Lab Results   Component Value Date    HGBA1C 6.5 (H) 2019       Nutritional status:   Body mass index is 37.67 kg/m².  Lab Results   Component Value Date    ALBUMIN 2.8 (L) 2019    ALBUMIN 2.9 (L) 2019    ALBUMIN 2.8 (L) 2019     Lab Results   Component Value Date    PREALBUMIN 27 2019    PREALBUMIN 28 2019    PREALBUMIN 29 2019       Estimated Creatinine Clearance: 48.7 mL/min (A) (based on SCr of 1.7 mg/dL (H)).    Accu-Checks  Recent Labs     19  2202 19  0805 19  1106 19  1724 19  2046 19  0813 19  1219 19  1648 19  2053 19  0746   POCTGLUCOSE 195* 143* 185* 143* 118* 106 166* 179* 155* 144*       Current Medications and/or Treatments Impacting Glycemic Control  Immunotherapy:    Immunosuppressants         Stop Route Frequency     tacrolimus capsule 2 mg      -- Oral 2 times daily     mycophenolate capsule 1,000 mg      -- Oral 2 times daily        Steroids:   Hormones (From admission, onward)    Start     Stop Route " Frequency Ordered    12/18/19 2100  predniSONE tablet 25 mg      -- Oral 2 times daily 12/18/19 0948        Pressors:    Autonomic Drugs (From admission, onward)    None        Hyperglycemia/Diabetes Medications:   Antihyperglycemics (From admission, onward)    Start     Stop Route Frequency Ordered    12/18/19 1700  insulin aspart U-100 pen 0-5 Units      -- SubQ Before meals & nightly PRN 12/18/19 1600

## 2019-12-22 NOTE — PLAN OF CARE
Pt is AAOx4; afebrile; vital signs stable. HR >90. Dobutamine and lasix drip are continuous. BG ranged from 144-204. All 4 chest tubes removed. Serosanguinous fluid draining from CT sites; occlusive bandage changed. She is up with standby assist. Pain is well controlled without need for medications. Appetite is good.  Patient is pulling meds 100%. Fall precautions in place. Pt instructed to call before ambulating, commode at bedside, bed in low position, call bell in reach, non-skid socks on when pt not in bed.

## 2019-12-23 PROBLEM — I47.20 VENTRICULAR TACHYCARDIA: Status: RESOLVED | Noted: 2019-10-20 | Resolved: 2019-12-23

## 2019-12-23 PROBLEM — R11.0 NAUSEA: Status: RESOLVED | Noted: 2019-12-09 | Resolved: 2019-12-23

## 2019-12-23 LAB
ABSOLUTE CD3: 3 CELLS/UL (ref 700–2100)
ABSOLUTE CD3: 3 CELLS/UL (ref 700–2100)
ABSOLUTE CD3: 5 CELLS/UL (ref 700–2100)
ALBUMIN SERPL BCP-MCNC: 3.2 G/DL (ref 3.5–5.2)
ALLENS TEST: ABNORMAL
ALLENS TEST: ABNORMAL
ALP SERPL-CCNC: 66 U/L (ref 55–135)
ALT SERPL W/O P-5'-P-CCNC: 35 U/L (ref 10–44)
ANION GAP SERPL CALC-SCNC: 10 MMOL/L (ref 8–16)
ANION GAP SERPL CALC-SCNC: 13 MMOL/L (ref 8–16)
AST SERPL-CCNC: 24 U/L (ref 10–40)
BASOPHILS # BLD AUTO: 0.01 K/UL (ref 0–0.2)
BASOPHILS NFR BLD: 0.1 % (ref 0–1.9)
BILIRUB SERPL-MCNC: 0.7 MG/DL (ref 0.1–1)
BUN SERPL-MCNC: 46 MG/DL (ref 6–20)
BUN SERPL-MCNC: 47 MG/DL (ref 6–20)
CALCIUM SERPL-MCNC: 8.5 MG/DL (ref 8.7–10.5)
CALCIUM SERPL-MCNC: 8.6 MG/DL (ref 8.7–10.5)
CD3%: 4.2 % (ref 55–83)
CD3%: 6.4 % (ref 55–83)
CD3%: 6.6 % (ref 55–83)
CHLORIDE SERPL-SCNC: 94 MMOL/L (ref 95–110)
CHLORIDE SERPL-SCNC: 96 MMOL/L (ref 95–110)
CO2 SERPL-SCNC: 29 MMOL/L (ref 23–29)
CO2 SERPL-SCNC: 33 MMOL/L (ref 23–29)
CREAT SERPL-MCNC: 1.9 MG/DL (ref 0.5–1.4)
CREAT SERPL-MCNC: 1.9 MG/DL (ref 0.5–1.4)
DELSYS: ABNORMAL
DELSYS: ABNORMAL
DIFFERENTIAL METHOD: ABNORMAL
EOSINOPHIL # BLD AUTO: 0 K/UL (ref 0–0.5)
EOSINOPHIL NFR BLD: 0.1 % (ref 0–8)
ERYTHROCYTE [DISTWIDTH] IN BLOOD BY AUTOMATED COUNT: 16.1 % (ref 11.5–14.5)
EST. GFR  (AFRICAN AMERICAN): 34.9 ML/MIN/1.73 M^2
EST. GFR  (AFRICAN AMERICAN): 34.9 ML/MIN/1.73 M^2
EST. GFR  (NON AFRICAN AMERICAN): 30.3 ML/MIN/1.73 M^2
EST. GFR  (NON AFRICAN AMERICAN): 30.3 ML/MIN/1.73 M^2
GLUCOSE SERPL-MCNC: 121 MG/DL (ref 70–110)
GLUCOSE SERPL-MCNC: 206 MG/DL (ref 70–110)
HCO3 UR-SCNC: 35.1 MMOL/L (ref 24–28)
HCO3 UR-SCNC: 35.7 MMOL/L (ref 24–28)
HCT VFR BLD AUTO: 28.1 % (ref 37–48.5)
HGB BLD-MCNC: 8.5 G/DL (ref 12–16)
IMM GRANULOCYTES # BLD AUTO: 0.1 K/UL (ref 0–0.04)
IMM GRANULOCYTES NFR BLD AUTO: 1.2 % (ref 0–0.5)
INR PPP: 1.2 (ref 0.8–1.2)
LYMPHOCYTES # BLD AUTO: 0.1 K/UL (ref 1–4.8)
LYMPHOCYTES NFR BLD: 1.1 % (ref 18–48)
MCH RBC QN AUTO: 26.9 PG (ref 27–31)
MCHC RBC AUTO-ENTMCNC: 30.2 G/DL (ref 32–36)
MCV RBC AUTO: 89 FL (ref 82–98)
MONOCYTES # BLD AUTO: 0.6 K/UL (ref 0.3–1)
MONOCYTES NFR BLD: 6.8 % (ref 4–15)
NEUTROPHILS # BLD AUTO: 7.5 K/UL (ref 1.8–7.7)
NEUTROPHILS NFR BLD: 90.7 % (ref 38–73)
NRBC BLD-RTO: 0 /100 WBC
PCO2 BLDA: 46.6 MMHG (ref 35–45)
PCO2 BLDA: 50.2 MMHG (ref 35–45)
PH SMN: 7.45 [PH] (ref 7.35–7.45)
PH SMN: 7.49 [PH] (ref 7.35–7.45)
PLATELET # BLD AUTO: 205 K/UL (ref 150–350)
PMV BLD AUTO: 10.8 FL (ref 9.2–12.9)
PO2 BLDA: 21 MMHG (ref 40–60)
PO2 BLDA: 32 MMHG (ref 40–60)
POC BE: 11 MMOL/L
POC BE: 12 MMOL/L
POC SATURATED O2: 36 % (ref 95–100)
POC SATURATED O2: 67 % (ref 95–100)
POC TCO2: 37 MMOL/L (ref 24–29)
POC TCO2: 37 MMOL/L (ref 24–29)
POCT GLUCOSE: 127 MG/DL (ref 70–110)
POCT GLUCOSE: 138 MG/DL (ref 70–110)
POCT GLUCOSE: 224 MG/DL (ref 70–110)
POCT GLUCOSE: 227 MG/DL (ref 70–110)
POTASSIUM SERPL-SCNC: 3.5 MMOL/L (ref 3.5–5.1)
POTASSIUM SERPL-SCNC: 3.8 MMOL/L (ref 3.5–5.1)
PROT SERPL-MCNC: 5.6 G/DL (ref 6–8.4)
PROTHROMBIN TIME: 12 SEC (ref 9–12.5)
RBC # BLD AUTO: 3.16 M/UL (ref 4–5.4)
SAMPLE: ABNORMAL
SAMPLE: ABNORMAL
SITE: ABNORMAL
SITE: ABNORMAL
SODIUM SERPL-SCNC: 137 MMOL/L (ref 136–145)
SODIUM SERPL-SCNC: 138 MMOL/L (ref 136–145)
TACROLIMUS BLD-MCNC: 5.8 NG/ML (ref 5–15)
WBC # BLD AUTO: 8.25 K/UL (ref 3.9–12.7)

## 2019-12-23 PROCEDURE — 85610 PROTHROMBIN TIME: CPT

## 2019-12-23 PROCEDURE — 63600175 PHARM REV CODE 636 W HCPCS: Performed by: PHYSICIAN ASSISTANT

## 2019-12-23 PROCEDURE — 99232 PR SUBSEQUENT HOSPITAL CARE,LEVL II: ICD-10-PCS | Mod: ,,, | Performed by: INTERNAL MEDICINE

## 2019-12-23 PROCEDURE — 20600001 HC STEP DOWN PRIVATE ROOM

## 2019-12-23 PROCEDURE — 85025 COMPLETE CBC W/AUTO DIFF WBC: CPT

## 2019-12-23 PROCEDURE — 99900035 HC TECH TIME PER 15 MIN (STAT)

## 2019-12-23 PROCEDURE — 99232 SBSQ HOSP IP/OBS MODERATE 35: CPT | Mod: ,,, | Performed by: INTERNAL MEDICINE

## 2019-12-23 PROCEDURE — 63600175 PHARM REV CODE 636 W HCPCS: Performed by: NURSE PRACTITIONER

## 2019-12-23 PROCEDURE — 86359 T CELLS TOTAL COUNT: CPT

## 2019-12-23 PROCEDURE — 36415 COLL VENOUS BLD VENIPUNCTURE: CPT

## 2019-12-23 PROCEDURE — 82803 BLOOD GASES ANY COMBINATION: CPT

## 2019-12-23 PROCEDURE — 97803 MED NUTRITION INDIV SUBSEQ: CPT

## 2019-12-23 PROCEDURE — 97110 THERAPEUTIC EXERCISES: CPT

## 2019-12-23 PROCEDURE — 80053 COMPREHEN METABOLIC PANEL: CPT

## 2019-12-23 PROCEDURE — 25000003 PHARM REV CODE 250: Performed by: PHYSICIAN ASSISTANT

## 2019-12-23 PROCEDURE — 80048 BASIC METABOLIC PNL TOTAL CA: CPT

## 2019-12-23 PROCEDURE — 80197 ASSAY OF TACROLIMUS: CPT

## 2019-12-23 PROCEDURE — 97116 GAIT TRAINING THERAPY: CPT

## 2019-12-23 PROCEDURE — 94761 N-INVAS EAR/PLS OXIMETRY MLT: CPT

## 2019-12-23 RX ORDER — PREDNISONE 10 MG/1
20 TABLET ORAL 2 TIMES DAILY
Status: COMPLETED | OUTPATIENT
Start: 2019-12-23 | End: 2019-12-25

## 2019-12-23 RX ORDER — TACROLIMUS 1 MG/1
2 CAPSULE ORAL EVERY MORNING
Status: DISCONTINUED | OUTPATIENT
Start: 2019-12-24 | End: 2019-12-24

## 2019-12-23 RX ORDER — SULFAMETHOXAZOLE AND TRIMETHOPRIM 400; 80 MG/1; MG/1
1 TABLET ORAL DAILY
Status: DISCONTINUED | OUTPATIENT
Start: 2019-12-24 | End: 2019-12-30 | Stop reason: HOSPADM

## 2019-12-23 RX ORDER — PREDNISONE 10 MG/1
10 TABLET ORAL 2 TIMES DAILY
Status: DISCONTINUED | OUTPATIENT
Start: 2019-12-27 | End: 2019-12-30 | Stop reason: HOSPADM

## 2019-12-23 RX ORDER — FAMOTIDINE 20 MG/1
20 TABLET, FILM COATED ORAL DAILY
Status: DISCONTINUED | OUTPATIENT
Start: 2019-12-24 | End: 2019-12-30 | Stop reason: HOSPADM

## 2019-12-23 RX ORDER — TACROLIMUS 1 MG/1
3 CAPSULE ORAL EVERY EVENING
Status: DISCONTINUED | OUTPATIENT
Start: 2019-12-23 | End: 2019-12-24

## 2019-12-23 RX ORDER — ATORVASTATIN CALCIUM 20 MG/1
20 TABLET, FILM COATED ORAL DAILY
Status: DISCONTINUED | OUTPATIENT
Start: 2019-12-24 | End: 2019-12-30 | Stop reason: HOSPADM

## 2019-12-23 RX ADMIN — NYSTATIN 500000 UNITS: 100000 SUSPENSION ORAL at 08:12

## 2019-12-23 RX ADMIN — PREDNISONE 25 MG: 10 TABLET ORAL at 08:12

## 2019-12-23 RX ADMIN — DOBUTAMINE IN DEXTROSE 5 MCG/KG/MIN: 200 INJECTION, SOLUTION INTRAVENOUS at 02:12

## 2019-12-23 RX ADMIN — MIRTAZAPINE 15 MG: 15 TABLET, FILM COATED ORAL at 07:12

## 2019-12-23 RX ADMIN — VENLAFAXINE HYDROCHLORIDE 150 MG: 37.5 CAPSULE, EXTENDED RELEASE ORAL at 08:12

## 2019-12-23 RX ADMIN — NYSTATIN 500000 UNITS: 100000 SUSPENSION ORAL at 07:12

## 2019-12-23 RX ADMIN — MYCOPHENOLATE MOFETIL 1000 MG: 250 CAPSULE ORAL at 08:12

## 2019-12-23 RX ADMIN — TACROLIMUS 3 MG: 1 CAPSULE ORAL at 05:12

## 2019-12-23 RX ADMIN — ASPIRIN 81 MG: 81 TABLET, COATED ORAL at 08:12

## 2019-12-23 RX ADMIN — FUROSEMIDE 20 MG/HR: 10 INJECTION, SOLUTION INTRAMUSCULAR; INTRAVENOUS at 09:12

## 2019-12-23 RX ADMIN — NYSTATIN 500000 UNITS: 100000 SUSPENSION ORAL at 12:12

## 2019-12-23 RX ADMIN — POLYETHYLENE GLYCOL 3350 17 G: 17 POWDER, FOR SOLUTION ORAL at 08:12

## 2019-12-23 RX ADMIN — PREDNISONE 20 MG: 10 TABLET ORAL at 07:12

## 2019-12-23 RX ADMIN — TACROLIMUS 2 MG: 1 CAPSULE ORAL at 08:12

## 2019-12-23 RX ADMIN — PANTOPRAZOLE SODIUM 40 MG: 40 TABLET, DELAYED RELEASE ORAL at 08:12

## 2019-12-23 RX ADMIN — GABAPENTIN 100 MG: 100 CAPSULE ORAL at 07:12

## 2019-12-23 RX ADMIN — NYSTATIN 500000 UNITS: 100000 SUSPENSION ORAL at 05:12

## 2019-12-23 RX ADMIN — GABAPENTIN 100 MG: 100 CAPSULE ORAL at 08:12

## 2019-12-23 RX ADMIN — GABAPENTIN 100 MG: 100 CAPSULE ORAL at 02:12

## 2019-12-23 RX ADMIN — INSULIN ASPART 1 UNITS: 100 INJECTION, SOLUTION INTRAVENOUS; SUBCUTANEOUS at 09:12

## 2019-12-23 RX ADMIN — ZOLPIDEM TARTRATE 5 MG: 5 TABLET ORAL at 09:12

## 2019-12-23 RX ADMIN — MYCOPHENOLATE MOFETIL 1000 MG: 250 CAPSULE ORAL at 07:12

## 2019-12-23 RX ADMIN — FUROSEMIDE 20 MG/HR: 10 INJECTION, SOLUTION INTRAMUSCULAR; INTRAVENOUS at 10:12

## 2019-12-23 NOTE — ASSESSMENT & PLAN NOTE
-Transplant Date 12/16/2019. HM 3 implanted 9/10/19 removed during transplant.    -HTS Primary.  -Transplanted by: Dr. Nuñez.  -CMV Status:D-/R+ .  -Rejection status: Low Risk.  -Hemodynamic support with:  5mcg/kg/mn. Continues on Lasix 20mg/hr. Will wean as tolerated.   -Current immunosuppression:Prednisone 25mg BID, MMF 1000 BID (CMV high risk); Prograf 2/2. 2 Doses of Thymo given 12/18, 12/19 due to and creat of 3.3.    -No biopsy this week. Will plan for biopsy next week.   -Prophylaxis- Nystatin.  Valcyte to begin on 12/26 and will start Bactrim once creat tolerates.  -Chest tubes removed. Pacer wires in place.

## 2019-12-23 NOTE — PROGRESS NOTES
LIJ TLC/introducer removed. Pressure held, and Vaseline gauze dressing applied. Pt tolerated well. Instructed pt to lie flat for 30 minutes, and to leave dressing in place for 24 hours. Will continue to monitor.

## 2019-12-23 NOTE — PROGRESS NOTES
Update:    SW met with pt alone in pt's room in order to provide continuity of care and support. SW also discussed discharge planning with pt as she could potentially go home on Friday. Pt was AAOx4, pleasant and reports coping well. Pt states that caregiver went home over the weekend but plans on coming back later this week for education and to provide support to the pt. SW to check availability with the [facility] to see if an apt would be available by Friday. Pt stating she would like to use [facility] at discharge. SW to set this up as well. Pt denying any further needs at this time. SW remains available for continued psychosocial support, education, resources, and additional d/c planning as needed.     SW contacted [facility] to see if an apartment would be available for Friday. SW notified that no availability can be assessed until Thursday due to ongoing renovations that will not be completed until at least that time. SW to reach back out on Thursday. BENIGNO notified HTS during interdisciplinary rounds.     BENIGNO made referral to [facility] (#[phone] fax#[phone]) via [employer]. SW notified by Renay that they will be able to take the pt. SW will notify pt. SW remains available.

## 2019-12-23 NOTE — PLAN OF CARE
Problem: Occupational Therapy Goal  Goal: Occupational Therapy Goal  Description  Goals to be met by: 1/2/19     Patient will increase functional independence with ADLs by performing:    UE Dressing with Modified Alexander.  LE Dressing with Modified Alexander.  Grooming while standing at sink with Modified Alexander .  Toileting from toilet with Modified Alexander for hygiene and clothing management.   Toilet transfers to toilet with Modified Alexander      Outcome: Ongoing, Progressing    Froy Rivas OTR/L  12/23/2019

## 2019-12-23 NOTE — PROGRESS NOTES
"[facility]  Adult Nutrition  Progress Note    SUMMARY       Recommendations    1. Continue Cardiac diet as tolerated.    - If po intake poor, recommend adding Boost Plus with meals.     2. RD following.    Goals: continue to eat >75% of all meals by RD follow-up  Nutrition Goal Status: new  Communication of RD Recs: (POC)    Reason for Assessment    Reason For Assessment: RD follow-up  Diagnosis: surgery/postoperative complications(s/p OHTx 12/16)  Relevant Medical History: NICM s/p LVAD 9/2019, HLD  Interdisciplinary Rounds: did not attend  General Information Comments: S/p OHTx 12/16. Pt resting in bed with no family members at bedside. Pt reports that appetite has improved and is now eating most of all meals. Denies N/V/D/C. NFPE completed 12/17. Pt continues with mild wasting, but does not meet malnutrition criteria at this time.  Nutrition Discharge Planning: Post transplant nutrition education provided 12/23/19. Food safety/drug interactions emphasized. General healthy/low salt diet recommended. RD name/contact information, education material left. No other needs identified.    Nutrition Risk Screen    Nutrition Risk Screen: no indicators present    Nutrition/Diet History    Patient Reported Diet/Restrictions/Preferences: low salt  Spiritual, Cultural Beliefs, Orthodox Practices, Values that Affect Care: no  Factors Affecting Nutritional Intake: None identified at this time    Anthropometrics    Temp: 98.2 °F (36.8 °C)  Height Method: Measured  Height: 5' 6" (167.6 cm)  Height (inches): 66 in  Weight Method: Bed Scale  Weight: 103.4 kg (228 lb)  Weight (lb): 228 lb  Ideal Body Weight (IBW), Female: 130 lb  % Ideal Body Weight, Female (lb): 169.23 %  BMI (Calculated): 36.8  BMI Grade: 35 - 39.9 - obesity - grade II     Lab/Procedures/Meds    Pertinent Labs Reviewed: reviewed  Pertinent Labs Comments: BUN 47, Cr 1.9, glucose 121  Pertinent Medications Reviewed: reviewed  Pertinent " Medications Comments: aspirin, predisone, tacrolimus, valgamciclovir, lasix    Estimated/Assessed Needs    Weight Used For Calorie Calculations: 103.4 kg (227 lb 15.3 oz)  Energy Calorie Requirements (kcal): 2087 kcal/day  Energy Need Method: [place]-St Jeor(x 1.25)  Protein Requirements:  g/day(1.5-2.0 g/kg)  Weight Used For Protein Calculations: 59.1 kg (130 lb 4.7 oz)(IBW)  Fluid Requirements (mL): 1 mL/kcal or per MD  Estimated Fluid Requirement Method: RDA Method  RDA Method (mL): 2087     Nutrition Prescription Ordered    Current Diet Order: Cardiac  Nutrition Order Comments: 1500mL FR    Evaluation of Received Nutrient/Fluid Intake    I/O: -1L x 24 hr, -16.12L since 12/9  Comments: LBM 12/21  Tolerance: tolerating  % Intake of Estimated Energy Needs: 75 - 100 %  % Meal Intake: 75 - 100 %    Nutrition Risk    Level of Risk/Frequency of Follow-up: (f/u 1 x wk)     Assessment and Plan    Nutrition Problem  Increased nutrient needs     Related to (etiology):   Physiological causes related to healing     Signs and Symptoms (as evidenced by):   S/p OHTx 12/16      Interventions (treatment strategy):  Collaboration of nutrition care with other providers     Nutrition Diagnosis Status:   Continues     Monitor and Evaluation    Food and Nutrient Intake: energy intake, food and beverage intake  Food and Nutrient Adminstration: diet order  Knowledge/Beliefs/Attitudes: food and nutrition knowledge/skill  Physical Activity and Function: nutrition-related ADLs and IADLs  Anthropometric Measurements: weight, weight change  Biochemical Data, Medical Tests and Procedures: electrolyte and renal panel, gastrointestinal profile, inflammatory profile  Nutrition-Focused Physical Findings: overall appearance     Malnutrition Assessment                 Orbital Region (Subcutaneous Fat Loss): well nourished  Upper Arm Region (Subcutaneous Fat Loss): well nourished  Thoracic and Lumbar Region: (BERNARD, restrained)   Holiness Region  (Muscle Loss): mild depletion  Clavicle Bone Region (Muscle Loss): mild depletion  Clavicle and Acromion Bone Region (Muscle Loss): well nourished  Scapular Bone Region (Muscle Loss): (BERNARD)  Dorsal Hand (Muscle Loss): well nourished  Patellar Region (Muscle Loss): well nourished  Anterior Thigh Region (Muscle Loss): well nourished  Posterior Calf Region (Muscle Loss): well nourished   Edema (Fluid Accumulation): trace             Nutrition Follow-Up    RD Follow-up?: Yes

## 2019-12-23 NOTE — PLAN OF CARE
[facility]   [facility]  [address] [place] [address]   [phone] after hours        HOME  HEALTH ORDERS  Admit to Home Health    Diagnosis:   Patient Active Problem List   Diagnosis    Knee pain    Pes anserine bursitis    Ventricular fibrillation    Congestive cardiomyopathy    History of ventricular fibrillation    History of ventricular tachycardia    Heart transplant candidate    Enlarged thyroid    Abnormal CT scan    CKD (chronic kidney disease)    Chronic systolic congestive heart failure    Pleural effusion    Anticoagulation monitoring, INR range 2-3    Long term (current) use of anticoagulants    GLO (acute kidney injury)    RVF (right ventricular failure)    Kidney transplant candidate    Adverse effect of adrenal cortical steroids, sequela    Stress hyperglycemia    Status post heart transplant    Immunosuppression    Heart replaced by transplant       Patient is homebound due to: S/P Heart Transplant  Diet: Low Sodium  Acitivities: As Tolerated    Nursing:   SN to complete comprehensive assessment including routine vital signs. Instruct on disease process and s/s of complications to report to MD. Review/verify medication list sent home with the patient at time of discharge  and instruct patient/caregiver as needed. Frequency may be adjusted depending on start of care date.    Notify MD if SBP > 160 or < 90; DBP > 90 or < 50; HR > 120 or < 50; Temp > 101; Weight gain >3lbs in 1 day or 5lbs in 1 week.    CONSULTS:     Physical Therapy to evaluate and treat. Evaluate for home safety and equipment needs; Establish/upgrade home exercise program. Perform / instruct on therapeutic exercises, gait training, transfer training, and Range of Motion.    Occupational Therapy to evaluate and treat. Evaluate home environment for safety and equipment needs. Perform/Instruct on transfers, ADL training, ROM, and therapeutic  exercises.    Send initial Home Health orders to HTS attending physician on call.  Send follow up questions to [phone] or fax:                      Post Transplant: [phone]

## 2019-12-23 NOTE — SUBJECTIVE & OBJECTIVE
Interval History: Patient with no new complaints today or issues overnight.     Lines:   Midline: Right cephalic vein: 12/4/19  Left IJ: 12/16/19  Pacer Wires: 12/16/19    Continuous Infusions:   DOBUTamine 5 mcg/kg/min (12/22/19 2051)    furosemide (LASIX) 2 mg/mL infusion (non-titrating) 20 mg/hr (12/22/19 2218)     Scheduled Meds:   aspirin  81 mg Oral Daily    gabapentin  100 mg Oral TID    mirtazapine  15 mg Oral QHS    mycophenolate  1,000 mg Oral BID    nystatin  500,000 Units Mouth/Throat QID (WM & HS)    pantoprazole  40 mg Oral Daily    polyethylene glycol  17 g Oral Daily    predniSONE  25 mg Oral BID    tacrolimus  2 mg Oral BID    [START ON 12/26/2019] valGANciclovir  900 mg Oral Daily    venlafaxine  150 mg Oral Daily     PRN Meds:sodium chloride, sodium chloride, acetaminophen, bisacodyl, Dextrose 10% Bolus, Dextrose 10% Bolus, glucagon (human recombinant), glucose, glucose, insulin aspart U-100, ondansetron, ondansetron, oxyCODONE, oxyCODONE, zolpidem    Review of patient's allergies indicates:   Allergen Reactions    Adhesive Blisters     Reaction to area in chest and up only    Codeine Itching     Objective:     Vital Signs (Most Recent):  Temp: 98.2 °F (36.8 °C) (12/23/19 0802)  Pulse: 95 (12/23/19 0805)  Resp: 15 (12/23/19 0805)  BP: (!) 105/58 (12/23/19 0805)  SpO2: 98 % (12/23/19 0805) Vital Signs (24h Range):  Temp:  [97.6 °F (36.4 °C)-98.5 °F (36.9 °C)] 98.2 °F (36.8 °C)  Pulse:  [85-95] 95  Resp:  [12-20] 15  SpO2:  [93 %-99 %] 98 %  BP: ()/(46-61) 105/58     Patient Vitals for the past 72 hrs (Last 3 readings):   Weight   12/23/19 0500 103.4 kg (228 lb)   12/21/19 0410 105.9 kg (233 lb 6.4 oz)     Body mass index is 36.8 kg/m².      Intake/Output Summary (Last 24 hours) at 12/23/2019 0937  Last data filed at 12/23/2019 0500  Gross per 24 hour   Intake 1847.76 ml   Output 2910 ml   Net -1062.24 ml       Hemodynamic Parameters:  CVP:  [12 mmHg] 12 mmHg    Telemetry:  reviewed: sinus tach: rates in 90's    Physical Exam  Constitutional: laying in bed NAD  Head: Normocephalic and atraumatic.   Eyes: Conjunctivae are normal.   Neck: Neck supple. Left IJ in place  Cardiovascular: Regular rate and rhythm; S1, S2 normal.  No rub, gallop or murmer  Pulmonary/Chest: Effort normal. Chest tubes intact. Serosanguinous drainage. Pacing wires in place.    Abdominal: Soft. There is no tenderness.   Musculoskeletal: She exhibits no deformity. Right femoral access site covered with dressing. No issues.    Neurological: She is alert.   Skin: Skin is warm and dry.   Psychiatric: Mood and affect appropriate    Nursing note and vitals reviewed.    Significant Labs:  CBC:  Recent Labs   Lab 12/21/19  0530 12/22/19  0530 12/23/19  0530   WBC 7.33 6.40 8.25   RBC 2.78* 2.87* 3.16*   HGB 7.6* 7.7* 8.5*   HCT 24.9* 25.5* 28.1*   * 126* 205   MCV 90 89 89   MCH 27.3 26.8* 26.9*   MCHC 30.5* 30.2* 30.2*     BNP:  No results for input(s): BNP in the last 168 hours.    Invalid input(s): BNPTRIAGELBLO  CMP:  Recent Labs   Lab 12/21/19  0530  12/22/19  0530 12/22/19  1526 12/23/19  0530      < > 150* 196* 121*   CALCIUM 8.4*   < > 8.6* 8.2* 8.5*   ALBUMIN 2.9*  --  2.8*  --  3.2*   PROT 5.3*  --  4.9*  --  5.6*      < > 138 137 137   K 4.0   < > 3.7 3.5 3.5   CO2 29   < > 32* 29 33*   CL 98   < > 96 97 94*   BUN 51*   < > 48* 46* 47*   CREATININE 1.8*   < > 1.7* 1.8* 1.9*   ALKPHOS 57  --  61  --  66   ALT 47*  --  37  --  35   AST 71*  --  34  --  24   BILITOT 0.8  --  0.7  --  0.7    < > = values in this interval not displayed.      Coagulation:   Recent Labs   Lab 12/16/19  1613 12/17/19  0102 12/17/19  0459  12/21/19  0530 12/22/19  0530 12/23/19  0530   INR 1.3* 1.3* 1.3*   < > 1.3* 1.4* 1.2   APTT 27.4 23.1 24.5  --   --   --   --     < > = values in this interval not displayed.     LDH:  No results for input(s): LDH in the last 72 hours.  Microbiology:  Microbiology Results (last 7  days)     ** No results found for the last 168 hours. **        have reviewed all pertinent labs within the past 24 hours.    Estimated Creatinine Clearance: 43 mL/min (A) (based on SCr of 1.9 mg/dL (H)).    Diagnostic Results:  I have reviewed all pertinent imaging results/findings within the past 24 hours.

## 2019-12-23 NOTE — PT/OT/SLP PROGRESS
Physical Therapy Treatment    Patient Name:  Deborah Navas   MRN:  [id number]    Recommendations:     Discharge Recommendations:  home health PT   Discharge Equipment Recommendations: none       Assessment:     Deborah Navas is a 49 y.o. female admitted with a medical diagnosis of Status post heart transplant.  She presents with the following impairments/functional limitations:  weakness, impaired endurance, impaired functional mobilty, gait instability, impaired balance, decreased upper extremity function, impaired cardiopulmonary response to activity .    Rehab Prognosis: Good; patient would benefit from acute skilled PT services to address these deficits and reach maximum level of function.    Recent Surgery: Procedure(s) (LRB):  TRANSPLANT, HEART (N/A) 7 Days Post-Op    Plan:     During this hospitalization, patient to be seen 4 x/week to address the identified rehab impairments via gait training, therapeutic activities, therapeutic exercises, neuromuscular re-education and progress toward the following goals:    · Plan of Care Expires:  01/16/20    Subjective     Chief Complaint: not being able to stand up from bedside chair   Patient/Family Comments/goals: to get better and return home   Pain/Comfort:  · Pain Rating 1: 0/10  · Pain Rating Post-Intervention 1: 0/10      Objective:     Communicated with RN prior to session.  Patient found sitting in bed with telemetry, pulse ox (continuous), blood pressure cuff, peripheral IV upon PT entry to room.     General Precautions: Standard, fall, sternal   Orthopedic Precautions:N/A   Braces: N/A     Functional Mobility:  · Bed Mobility: not performed 2nd to pt found sitting in bed  · Transfers:     · Sit to Stand:  supervision with no AD from EOB; min A from bedside chair   · Gait: 180ft with CGA  · No overt LOB but pt unsteady  · SOB present with signs of fatigue  · Pt demo'd decreased jonathan and inconsistent foot placement       AM-PAC 6 CLICK  MOBILITY  Turning over in bed (including adjusting bedclothes, sheets and blankets)?: 3  Sitting down on and standing up from a chair with arms (e.g., wheelchair, bedside commode, etc.): 3  Moving from lying on back to sitting on the side of the bed?: 3  Moving to and from a bed to a chair (including a wheelchair)?: 3  Need to walk in hospital room?: 3  Climbing 3-5 steps with a railing?: 2  Basic Mobility Total Score: 17       Therapeutic Activities and Exercises:  Educated pt on PT role/POC  Educated pt on importance of OOB activity and daily ambulation   Educated pt on sternal precautions  Educated pt on B LE TE to perform daily  Pt verbalized understanding      Sit to stand 2 x 5 trials to increased B LE strength (first trial with raised bed; 2nd trial with bed slightly lower)  · Educated pt on how to appropriately breathe during sit to stand transition    B LE standing hip abductions x 10 reps holding onto PT for stability to strengthen glute med     Pt gauze saturated with blood during PT session. RN already aware blood oozing from old chest tube site. Alerted RN about saturation of gauze     Patient left sitting in bed with all lines intact, call button in reach and RN notified..    GOALS:   Multidisciplinary Problems     Physical Therapy Goals        Problem: Physical Therapy Goal    Goal Priority Disciplines Outcome Goal Variances Interventions   Physical Therapy Goal     PT, PT/OT Ongoing, Progressing     Description:  Goals to be met by: 2020     Patient will increase functional independence with mobility by performin.Supine to Sit with CGA - not met  2. Sit to stand with Supervision - not met  3. Gait x 150ft with Supervision - not met  4. Ascend/descend 4 stairs with CGA - not met  5. Standing for 3 minutes with CGA to increase activity tolerance - not met                        Time Tracking:     PT Received On: 19  PT Start Time: 1335     PT Stop Time: 1404  PT Total Time (min): 29  min     Billable Minutes: Gait Training 15 and Therapeutic Exercise 8    Treatment Type: Treatment  PT/PTA: PT     PTA Visit Number: 0     Niurka Posadas PT, SANDY  12/23/2019  [phone]

## 2019-12-23 NOTE — PT/OT/SLP PROGRESS
Occupational Therapy   Treatment    Name: Deborah Navas  MRN: [id number]  Admitting Diagnosis:  Status post heart transplant  7 Days Post-Op    Recommendations:     Discharge Recommendations: home with home health  Discharge Equipment Recommendations:  none  Barriers to discharge:  None    Assessment:     Deborah Navas is a 49 y.o. female with a medical diagnosis of Status post heart transplant.  She presents with impairments listed below. Pt did well to tolerate and participate in session. Pt progressing towards goals. Pt still presents w/ overall ability to complete community level functional mobility and ADLs. Pt displayed global deconditioning requiring increased assist for ADLs and mobility at this time. Pt would benefit from skilled OT services to improve independence and overall occupational functioning.     Performance deficits affecting function are impaired endurance, impaired self care skills, impaired functional mobilty, gait instability, impaired balance, decreased lower extremity function, impaired cardiopulmonary response to activity.     Rehab Prognosis:  Good; patient would benefit from acute skilled OT services to address these deficits and reach maximum level of function.       Plan:     Patient to be seen 3 x/week to address the above listed problems via therapeutic activities, therapeutic exercises  · Plan of Care Expires: 01/18/20  · Plan of Care Reviewed with: patient    Subjective     Pain/Comfort:  · Pain Rating 1: 0/10  · Pain Rating Post-Intervention 1: 0/10    Objective:     Communicated with: RN prior to session.  Patient found seated up in bed with telemetry, pulse ox (continuous), blood pressure cuff, peripheral IV upon OT entry to room.    General Precautions: Standard, fall, sternal   Orthopedic Precautions:N/A   Braces: N/A     Occupational Performance:     Bed Mobility:    · Patient completed Scooting/Bridging with independence  · Patient completed Supine to Sit  with independence  · Patient completed Sit to Supine with independence     Functional Mobility/Transfers:  · Patient completed Sit <> Stand Transfer with stand by assistance  with  no assistive device   · Functional Mobility: Pt ambulated ~125 ft cga<>standing rest break<>~125 ft cga w/o AD. Pt noted w/ slightly unsteady gait.        [facility] 6 Click ADL: 20    Treatment & Education:  Pt educated on POC.    Patient left seated in bed with all lines intact and call button in reachEducation:      GOALS:   Multidisciplinary Problems     Occupational Therapy Goals        Problem: Occupational Therapy Goal    Goal Priority Disciplines Outcome Interventions   Occupational Therapy Goal     OT, PT/OT Ongoing, Progressing    Description:  Goals to be met by: 1/2/19     Patient will increase functional independence with ADLs by performing:    UE Dressing with Modified Stewart.  LE Dressing with Modified Stewart.  Grooming while standing at sink with Modified Stewart .  Toileting from toilet with Modified Stewart for hygiene and clothing management.   Toilet transfers to toilet with Modified Stewart                       Time Tracking:     OT Date of Treatment: 12/23/19  OT Start Time: 1153  OT Stop Time: 1205  OT Total Time (min): 12 min    Billable Minutes:Therapeutic Exercise 12 minutes    Froy Rivas OT  12/23/2019

## 2019-12-23 NOTE — PLAN OF CARE
Recommendations    1. Continue Cardiac diet as tolerated.    - If po intake poor, recommend adding Boost Plus with meals.     2. RD following.    Goals: continue to eat >75% of all meals by RD follow-up  Nutrition Goal Status: new

## 2019-12-23 NOTE — CARE UPDATE
BG goal 140-180    Remains in TSU, NAEON. BG at or slightly above; required prn correction scale yesterday. Prednisone 25 mg BID; standard steroid taper per primary. Noted creatinine of 1.9.     Plan: BG monitoring ac/hs and low dose correction scale.     Discharge planning: TBD     Endocrine to continue to follow    ** Please call Endocrine for any BG related issues **

## 2019-12-23 NOTE — PROGRESS NOTES
[facility]  Heart Transplant  Progress Note    Patient Name: Deborah Navas  MRN: [id number]  Admission Date: 11/13/2019  Hospital Length of Stay: 38 days  Attending Physician: Eric Antunez Jr.,*  Primary Care Provider: Primary Doctor No  Principal Problem:Status post heart transplant    Subjective:     Interval History: Patient with no new complaints today or issues overnight.     Lines:   Midline: Right cephalic vein: 12/4/19  Left IJ: 12/16/19  Pacer Wires: 12/16/19    Continuous Infusions:   DOBUTamine 5 mcg/kg/min (12/22/19 2051)    furosemide (LASIX) 2 mg/mL infusion (non-titrating) 20 mg/hr (12/22/19 2218)     Scheduled Meds:   aspirin  81 mg Oral Daily    gabapentin  100 mg Oral TID    mirtazapine  15 mg Oral QHS    mycophenolate  1,000 mg Oral BID    nystatin  500,000 Units Mouth/Throat QID (WM & HS)    pantoprazole  40 mg Oral Daily    polyethylene glycol  17 g Oral Daily    predniSONE  25 mg Oral BID    tacrolimus  2 mg Oral BID    [START ON 12/26/2019] valGANciclovir  900 mg Oral Daily    venlafaxine  150 mg Oral Daily     PRN Meds:sodium chloride, sodium chloride, acetaminophen, bisacodyl, Dextrose 10% Bolus, Dextrose 10% Bolus, glucagon (human recombinant), glucose, glucose, insulin aspart U-100, ondansetron, ondansetron, oxyCODONE, oxyCODONE, zolpidem    Review of patient's allergies indicates:   Allergen Reactions    Adhesive Blisters     Reaction to area in chest and up only    Codeine Itching     Objective:     Vital Signs (Most Recent):  Temp: 98.2 °F (36.8 °C) (12/23/19 0802)  Pulse: 95 (12/23/19 0805)  Resp: 15 (12/23/19 0805)  BP: (!) 105/58 (12/23/19 0805)  SpO2: 98 % (12/23/19 0805) Vital Signs (24h Range):  Temp:  [97.6 °F (36.4 °C)-98.5 °F (36.9 °C)] 98.2 °F (36.8 °C)  Pulse:  [85-95] 95  Resp:  [12-20] 15  SpO2:  [93 %-99 %] 98 %  BP: ()/(46-61) 105/58     Patient Vitals for the past 72 hrs (Last 3 readings):   Weight   12/23/19 0500  103.4 kg (228 lb)   12/21/19 0410 105.9 kg (233 lb 6.4 oz)     Body mass index is 36.8 kg/m².      Intake/Output Summary (Last 24 hours) at 12/23/2019 0937  Last data filed at 12/23/2019 0500  Gross per 24 hour   Intake 1847.76 ml   Output 2910 ml   Net -1062.24 ml       Hemodynamic Parameters:  CVP:  [12 mmHg] 12 mmHg    Telemetry: reviewed: sinus tach: rates in 90's    Physical Exam  Constitutional: laying in bed NAD  Head: Normocephalic and atraumatic.   Eyes: Conjunctivae are normal.   Neck: Neck supple. Left IJ in place  Cardiovascular: Regular rate and rhythm; S1, S2 normal.  No rub, gallop or murmer  Pulmonary/Chest: Effort normal. Chest tubes intact. Serosanguinous drainage. Pacing wires in place.    Abdominal: Soft. There is no tenderness.   Musculoskeletal: She exhibits no deformity. Right femoral access site covered with dressing. No issues.    Neurological: She is alert.   Skin: Skin is warm and dry.   Psychiatric: Mood and affect appropriate    Nursing note and vitals reviewed.    Significant Labs:  CBC:  Recent Labs   Lab 12/21/19  0530 12/22/19  0530 12/23/19  0530   WBC 7.33 6.40 8.25   RBC 2.78* 2.87* 3.16*   HGB 7.6* 7.7* 8.5*   HCT 24.9* 25.5* 28.1*   * 126* 205   MCV 90 89 89   MCH 27.3 26.8* 26.9*   MCHC 30.5* 30.2* 30.2*     BNP:  No results for input(s): BNP in the last 168 hours.    Invalid input(s): BNPTRIAGELBLO  CMP:  Recent Labs   Lab 12/21/19  0530  12/22/19  0530 12/22/19  1526 12/23/19  0530      < > 150* 196* 121*   CALCIUM 8.4*   < > 8.6* 8.2* 8.5*   ALBUMIN 2.9*  --  2.8*  --  3.2*   PROT 5.3*  --  4.9*  --  5.6*      < > 138 137 137   K 4.0   < > 3.7 3.5 3.5   CO2 29   < > 32* 29 33*   CL 98   < > 96 97 94*   BUN 51*   < > 48* 46* 47*   CREATININE 1.8*   < > 1.7* 1.8* 1.9*   ALKPHOS 57  --  61  --  66   ALT 47*  --  37  --  35   AST 71*  --  34  --  24   BILITOT 0.8  --  0.7  --  0.7    < > = values in this interval not displayed.      Coagulation:   Recent Labs    Lab 12/16/19  1613 12/17/19  0102 12/17/19  0459  12/21/19  0530 12/22/19  0530 12/23/19  0530   INR 1.3* 1.3* 1.3*   < > 1.3* 1.4* 1.2   APTT 27.4 23.1 24.5  --   --   --   --     < > = values in this interval not displayed.     LDH:  No results for input(s): LDH in the last 72 hours.  Microbiology:  Microbiology Results (last 7 days)     ** No results found for the last 168 hours. **        have reviewed all pertinent labs within the past 24 hours.    Estimated Creatinine Clearance: 43 mL/min (A) (based on SCr of 1.9 mg/dL (H)).    Diagnostic Results:  I have reviewed all pertinent imaging results/findings within the past 24 hours.    Assessment and Plan:     50 yo WF with NICM, s/p HM3 implantation 9/10/19 (post up coarse uncomplicated with the exception of+ diaphragmatic stimulation of LV lead and pleural effusion with chest tube placement, atrial flutter with NADINE/DCCV), V-fib reported in setting of hypokalemia with appropriate shock from ICD on Amiodarone prior to LVAD placement; and recent hospitalizations for ventricular arrythmias; started on Mexilitine.  She was seen in EP clinic today and she continues to have multiple VT episodes with some ATP therapy, although no ICD shocks since starting mexiletine 10/24/2019. One slow VT episode 2.5hrs 11/3/2019. Patient asymptomatic with LVAD. On coumadin. No AFL since post-op event (paced out of rhythm 9/24/2019). CHB with 100% V pacing (LV lead off). She does not feel well. Dry heaving with mexiletine. Taking phenergan PRN. 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   She was seen in HTS clinic and reported 4 low flow alarms the last 4 nights.  She reports they occur in her sleep and last for only a few seconds.  By the time she wakes up; they quickly stop.  She does report to possibly being little volume overloaded.  She hasn't noticed weight gain at home but thinks she may have little extra fluid.  She denies SOB, chest pain,  palpitations, LEGER. In review of her records: she was 223lbs on 10/24/19 during previous hospitalization and is 235lbs now.  Her lasix had previously been decreased to prn.     * Status post heart transplant  -Transplant Date 12/16/2019. HM 3 implanted 9/10/19 removed during transplant.    -HTS Primary.  -Transplanted by: Dr. Nuñez.  -CMV Status:D-/R+ .  -Rejection status: Low Risk.  -Hemodynamic support with:  5mcg/kg/mn. Continues on Lasix 20mg/hr. Will wean as tolerated.   -Current immunosuppression:Prednisone 25mg BID, MMF 1000 BID (CMV high risk); Prograf 2/2. 2 Doses of Thymo given 12/18, 12/19 due to and creat of 3.3.    -No biopsy this week. Will plan for biopsy next week. Will repeat echo tomorrow.   -Prophylaxis- Nystatin.  Valcyte to begin on 12/26 and will start Bactrim once creat tolerates.  -Chest tubes removed. Pacer wires in place.    -Will get PIV placed and remove central line.       GLO (acute kidney injury)  - Baseline creat was 1.3-1.6  - Acute elevation post transplant  - Will avoid nephrotoxins and monitor       CKD (chronic kidney disease)  - Baseline creat appears 1.3-1.6.   - see above GLO      Americo Albert NP  Heart Transplant  [facility]

## 2019-12-24 LAB
25(OH)D3+25(OH)D2 SERPL-MCNC: 12 NG/ML (ref 30–96)
ALBUMIN SERPL BCP-MCNC: 2.8 G/DL (ref 3.5–5.2)
ALP SERPL-CCNC: 66 U/L (ref 55–135)
ALT SERPL W/O P-5'-P-CCNC: 29 U/L (ref 10–44)
ANION GAP SERPL CALC-SCNC: 11 MMOL/L (ref 8–16)
ANION GAP SERPL CALC-SCNC: 15 MMOL/L (ref 8–16)
ASCENDING AORTA: 2.12 CM
AST SERPL-CCNC: 21 U/L (ref 10–40)
AV INDEX (PROSTH): 0.68
AV MEAN GRADIENT: 17 MMHG
AV PEAK GRADIENT: 34 MMHG
AV VALVE AREA: 2.26 CM2
AV VELOCITY RATIO: 0.5
BASOPHILS # BLD AUTO: 0 K/UL (ref 0–0.2)
BASOPHILS NFR BLD: 0 % (ref 0–1.9)
BILIRUB SERPL-MCNC: 0.6 MG/DL (ref 0.1–1)
BNP SERPL-MCNC: 564 PG/ML (ref 0–99)
BSA FOR ECHO PROCEDURE: 2.2 M2
BUN SERPL-MCNC: 45 MG/DL (ref 6–20)
BUN SERPL-MCNC: 49 MG/DL (ref 6–20)
CALCIUM SERPL-MCNC: 8.2 MG/DL (ref 8.7–10.5)
CALCIUM SERPL-MCNC: 8.5 MG/DL (ref 8.7–10.5)
CHLORIDE SERPL-SCNC: 99 MMOL/L (ref 95–110)
CHLORIDE SERPL-SCNC: 99 MMOL/L (ref 95–110)
CO2 SERPL-SCNC: 27 MMOL/L (ref 23–29)
CO2 SERPL-SCNC: 32 MMOL/L (ref 23–29)
CREAT SERPL-MCNC: 1.7 MG/DL (ref 0.5–1.4)
CREAT SERPL-MCNC: 1.8 MG/DL (ref 0.5–1.4)
CV ECHO LV RWT: 0.38 CM
DIFFERENTIAL METHOD: ABNORMAL
DOP CALC AO PEAK VEL: 2.93 M/S
DOP CALC AO VTI: 36.49 CM
DOP CALC LVOT AREA: 3.3 CM2
DOP CALC LVOT DIAMETER: 2.06 CM
DOP CALC LVOT PEAK VEL: 1.46 M/S
DOP CALC LVOT STROKE VOLUME: 82.41 CM3
DOP CALCLVOT PEAK VEL VTI: 24.74 CM
ECHO LV POSTERIOR WALL: 0.81 CM (ref 0.6–1.1)
EOSINOPHIL # BLD AUTO: 0 K/UL (ref 0–0.5)
EOSINOPHIL NFR BLD: 0 % (ref 0–8)
ERYTHROCYTE [DISTWIDTH] IN BLOOD BY AUTOMATED COUNT: 16.1 % (ref 11.5–14.5)
EST. GFR  (AFRICAN AMERICAN): 37.3 ML/MIN/1.73 M^2
EST. GFR  (AFRICAN AMERICAN): 40 ML/MIN/1.73 M^2
EST. GFR  (NON AFRICAN AMERICAN): 32.4 ML/MIN/1.73 M^2
EST. GFR  (NON AFRICAN AMERICAN): 34.7 ML/MIN/1.73 M^2
FERRITIN SERPL-MCNC: 79 NG/ML (ref 20–300)
FRACTIONAL SHORTENING: 38 % (ref 28–44)
GLUCOSE SERPL-MCNC: 149 MG/DL (ref 70–110)
GLUCOSE SERPL-MCNC: 185 MG/DL (ref 70–110)
HCT VFR BLD AUTO: 25.7 % (ref 37–48.5)
HGB BLD-MCNC: 8 G/DL (ref 12–16)
IMM GRANULOCYTES # BLD AUTO: 0.12 K/UL (ref 0–0.04)
IMM GRANULOCYTES NFR BLD AUTO: 1.5 % (ref 0–0.5)
INTERVENTRICULAR SEPTUM: 0.84 CM (ref 0.6–1.1)
IRON SERPL-MCNC: 30 UG/DL (ref 30–160)
IVRT: 0.07 MSEC
LEFT INTERNAL DIMENSION IN SYSTOLE: 2.64 CM (ref 2.1–4)
LEFT VENTRICLE DIASTOLIC VOLUME INDEX: 38.48 ML/M2
LEFT VENTRICLE DIASTOLIC VOLUME: 81.67 ML
LEFT VENTRICLE MASS INDEX: 51 G/M2
LEFT VENTRICLE SYSTOLIC VOLUME INDEX: 12.1 ML/M2
LEFT VENTRICLE SYSTOLIC VOLUME: 25.58 ML
LEFT VENTRICULAR INTERNAL DIMENSION IN DIASTOLE: 4.27 CM (ref 3.5–6)
LEFT VENTRICULAR MASS: 108.44 G
LYMPHOCYTES # BLD AUTO: 0.1 K/UL (ref 1–4.8)
LYMPHOCYTES NFR BLD: 0.7 % (ref 18–48)
MCH RBC QN AUTO: 27.4 PG (ref 27–31)
MCHC RBC AUTO-ENTMCNC: 31.1 G/DL (ref 32–36)
MCV RBC AUTO: 88 FL (ref 82–98)
MONOCYTES # BLD AUTO: 0.5 K/UL (ref 0.3–1)
MONOCYTES NFR BLD: 6 % (ref 4–15)
NEUTROPHILS # BLD AUTO: 7.6 K/UL (ref 1.8–7.7)
NEUTROPHILS NFR BLD: 91.8 % (ref 38–73)
NRBC BLD-RTO: 0 /100 WBC
PISA TR MAX VEL: 2.27 M/S
PLATELET # BLD AUTO: 252 K/UL (ref 150–350)
PMV BLD AUTO: 9.9 FL (ref 9.2–12.9)
POCT GLUCOSE: 141 MG/DL (ref 70–110)
POCT GLUCOSE: 168 MG/DL (ref 70–110)
POCT GLUCOSE: 175 MG/DL (ref 70–110)
POCT GLUCOSE: 176 MG/DL (ref 70–110)
POTASSIUM SERPL-SCNC: 3.4 MMOL/L (ref 3.5–5.1)
POTASSIUM SERPL-SCNC: 3.9 MMOL/L (ref 3.5–5.1)
PROT SERPL-MCNC: 5 G/DL (ref 6–8.4)
RBC # BLD AUTO: 2.92 M/UL (ref 4–5.4)
RIGHT VENTRICULAR END-DIASTOLIC DIMENSION: 2.82 CM
RV TISSUE DOPPLER FREE WALL SYSTOLIC VELOCITY 1 (APICAL 4 CHAMBER VIEW): 9.35 CM/S
SATURATED IRON: 9 % (ref 20–50)
SINUS: 2.66 CM
SODIUM SERPL-SCNC: 141 MMOL/L (ref 136–145)
SODIUM SERPL-SCNC: 142 MMOL/L (ref 136–145)
STJ: 2.4 CM
TACROLIMUS BLD-MCNC: 5 NG/ML (ref 5–15)
TDI LATERAL: 0.11 M/S
TDI SEPTAL: 0.11 M/S
TDI: 0.11 M/S
TOTAL IRON BINDING CAPACITY: 318 UG/DL (ref 250–450)
TR MAX PG: 21 MMHG
TRANSFERRIN SERPL-MCNC: 215 MG/DL (ref 200–375)
TRICUSPID ANNULAR PLANE SYSTOLIC EXCURSION: 1.07 CM
WBC # BLD AUTO: 8.27 K/UL (ref 3.9–12.7)

## 2019-12-24 PROCEDURE — 82306 VITAMIN D 25 HYDROXY: CPT

## 2019-12-24 PROCEDURE — 25000003 PHARM REV CODE 250: Performed by: PHYSICIAN ASSISTANT

## 2019-12-24 PROCEDURE — 80048 BASIC METABOLIC PNL TOTAL CA: CPT

## 2019-12-24 PROCEDURE — 63600175 PHARM REV CODE 636 W HCPCS: Performed by: INTERNAL MEDICINE

## 2019-12-24 PROCEDURE — 25000003 PHARM REV CODE 250: Performed by: INTERNAL MEDICINE

## 2019-12-24 PROCEDURE — 80053 COMPREHEN METABOLIC PANEL: CPT

## 2019-12-24 PROCEDURE — 25000003 PHARM REV CODE 250: Performed by: NURSE PRACTITIONER

## 2019-12-24 PROCEDURE — 63600175 PHARM REV CODE 636 W HCPCS: Performed by: PHYSICIAN ASSISTANT

## 2019-12-24 PROCEDURE — 82728 ASSAY OF FERRITIN: CPT

## 2019-12-24 PROCEDURE — 36415 COLL VENOUS BLD VENIPUNCTURE: CPT

## 2019-12-24 PROCEDURE — 99232 PR SUBSEQUENT HOSPITAL CARE,LEVL II: ICD-10-PCS | Mod: ,,, | Performed by: INTERNAL MEDICINE

## 2019-12-24 PROCEDURE — 85025 COMPLETE CBC W/AUTO DIFF WBC: CPT

## 2019-12-24 PROCEDURE — 83540 ASSAY OF IRON: CPT

## 2019-12-24 PROCEDURE — 63600175 PHARM REV CODE 636 W HCPCS: Performed by: NURSE PRACTITIONER

## 2019-12-24 PROCEDURE — 80197 ASSAY OF TACROLIMUS: CPT

## 2019-12-24 PROCEDURE — 99232 SBSQ HOSP IP/OBS MODERATE 35: CPT | Mod: ,,, | Performed by: INTERNAL MEDICINE

## 2019-12-24 PROCEDURE — 83880 ASSAY OF NATRIURETIC PEPTIDE: CPT

## 2019-12-24 PROCEDURE — 20600001 HC STEP DOWN PRIVATE ROOM

## 2019-12-24 RX ORDER — TACROLIMUS 1 MG/1
1 CAPSULE ORAL ONCE
Status: COMPLETED | OUTPATIENT
Start: 2019-12-24 | End: 2019-12-24

## 2019-12-24 RX ORDER — FERROUS GLUCONATE 324(37.5)
324 TABLET ORAL
Status: DISCONTINUED | OUTPATIENT
Start: 2019-12-24 | End: 2019-12-30 | Stop reason: HOSPADM

## 2019-12-24 RX ORDER — ERGOCALCIFEROL 1.25 MG/1
50000 CAPSULE ORAL DAILY
Status: COMPLETED | OUTPATIENT
Start: 2019-12-24 | End: 2019-12-28

## 2019-12-24 RX ORDER — TACROLIMUS 1 MG/1
3 CAPSULE ORAL EVERY MORNING
Status: DISCONTINUED | OUTPATIENT
Start: 2019-12-25 | End: 2019-12-30 | Stop reason: HOSPADM

## 2019-12-24 RX ORDER — OXYCODONE HYDROCHLORIDE 5 MG/1
5 TABLET ORAL EVERY 4 HOURS PRN
Status: DISCONTINUED | OUTPATIENT
Start: 2019-12-24 | End: 2019-12-27

## 2019-12-24 RX ORDER — VALGANCICLOVIR 450 MG/1
450 TABLET, FILM COATED ORAL DAILY
Status: DISCONTINUED | OUTPATIENT
Start: 2019-12-26 | End: 2019-12-30 | Stop reason: HOSPADM

## 2019-12-24 RX ORDER — POTASSIUM CHLORIDE 20 MEQ/1
40 TABLET, EXTENDED RELEASE ORAL DAILY
Status: DISCONTINUED | OUTPATIENT
Start: 2019-12-24 | End: 2019-12-26

## 2019-12-24 RX ORDER — TACROLIMUS 1 MG/1
4 CAPSULE ORAL EVERY EVENING
Status: DISCONTINUED | OUTPATIENT
Start: 2019-12-24 | End: 2019-12-30 | Stop reason: HOSPADM

## 2019-12-24 RX ADMIN — TACROLIMUS 4 MG: 1 CAPSULE ORAL at 05:12

## 2019-12-24 RX ADMIN — MYCOPHENOLATE MOFETIL 1000 MG: 250 CAPSULE ORAL at 08:12

## 2019-12-24 RX ADMIN — VENLAFAXINE HYDROCHLORIDE 150 MG: 37.5 CAPSULE, EXTENDED RELEASE ORAL at 08:12

## 2019-12-24 RX ADMIN — DOBUTAMINE IN DEXTROSE 5 MCG/KG/MIN: 200 INJECTION, SOLUTION INTRAVENOUS at 06:12

## 2019-12-24 RX ADMIN — FERROUS GLUCONATE TAB 324 MG (37.5 MG ELEMENTAL IRON) 324 MG: 324 (37.5 FE) TAB at 05:12

## 2019-12-24 RX ADMIN — ERGOCALCIFEROL 50000 UNITS: 1.25 CAPSULE ORAL at 12:12

## 2019-12-24 RX ADMIN — NYSTATIN 500000 UNITS: 100000 SUSPENSION ORAL at 10:12

## 2019-12-24 RX ADMIN — HUMAN ALBUMIN MICROSPHERES AND PERFLUTREN 0.66 MG: 10; .22 INJECTION, SOLUTION INTRAVENOUS at 11:12

## 2019-12-24 RX ADMIN — ATORVASTATIN CALCIUM 20 MG: 20 TABLET, FILM COATED ORAL at 08:12

## 2019-12-24 RX ADMIN — POLYETHYLENE GLYCOL 3350 17 G: 17 POWDER, FOR SOLUTION ORAL at 08:12

## 2019-12-24 RX ADMIN — ASPIRIN 81 MG: 81 TABLET, COATED ORAL at 08:12

## 2019-12-24 RX ADMIN — NYSTATIN 500000 UNITS: 100000 SUSPENSION ORAL at 05:12

## 2019-12-24 RX ADMIN — TACROLIMUS 2 MG: 1 CAPSULE ORAL at 08:12

## 2019-12-24 RX ADMIN — MYCOPHENOLATE MOFETIL 1000 MG: 250 CAPSULE ORAL at 10:12

## 2019-12-24 RX ADMIN — NYSTATIN 500000 UNITS: 100000 SUSPENSION ORAL at 08:12

## 2019-12-24 RX ADMIN — ZOLPIDEM TARTRATE 5 MG: 5 TABLET ORAL at 10:12

## 2019-12-24 RX ADMIN — DOBUTAMINE IN DEXTROSE 5 MCG/KG/MIN: 200 INJECTION, SOLUTION INTRAVENOUS at 10:12

## 2019-12-24 RX ADMIN — NYSTATIN 500000 UNITS: 100000 SUSPENSION ORAL at 12:12

## 2019-12-24 RX ADMIN — GABAPENTIN 100 MG: 100 CAPSULE ORAL at 08:12

## 2019-12-24 RX ADMIN — PREDNISONE 20 MG: 10 TABLET ORAL at 08:12

## 2019-12-24 RX ADMIN — FUROSEMIDE 20 MG/HR: 10 INJECTION, SOLUTION INTRAMUSCULAR; INTRAVENOUS at 07:12

## 2019-12-24 RX ADMIN — GABAPENTIN 100 MG: 100 CAPSULE ORAL at 02:12

## 2019-12-24 RX ADMIN — TACROLIMUS 1 MG: 1 CAPSULE ORAL at 02:12

## 2019-12-24 RX ADMIN — GABAPENTIN 100 MG: 100 CAPSULE ORAL at 10:12

## 2019-12-24 RX ADMIN — PREDNISONE 20 MG: 10 TABLET ORAL at 10:12

## 2019-12-24 RX ADMIN — SULFAMETHOXAZOLE AND TRIMETHOPRIM 1 TABLET: 400; 80 TABLET ORAL at 08:12

## 2019-12-24 RX ADMIN — FAMOTIDINE 20 MG: 20 TABLET ORAL at 08:12

## 2019-12-24 RX ADMIN — FUROSEMIDE 10 MG/HR: 10 INJECTION, SOLUTION INTRAMUSCULAR; INTRAVENOUS at 10:12

## 2019-12-24 RX ADMIN — FERROUS GLUCONATE TAB 324 MG (37.5 MG ELEMENTAL IRON) 324 MG: 324 (37.5 FE) TAB at 12:12

## 2019-12-24 RX ADMIN — MIRTAZAPINE 15 MG: 15 TABLET, FILM COATED ORAL at 10:12

## 2019-12-24 RX ADMIN — POTASSIUM CHLORIDE 40 MEQ: 1500 TABLET, EXTENDED RELEASE ORAL at 12:12

## 2019-12-24 NOTE — ASSESSMENT & PLAN NOTE
-Transplant Date 12/16/2019. HM 3 implanted 9/10/19 removed during transplant.    -HTS Primary.  -Transplanted by: Dr. Nuñez.  -CMV Status:D-/R+ .  -Rejection status: Low Risk.  -Hemodynamic support with:  5mcg/kg/mn. Continues on Lasix 10mg/hr. Will wean as tolerated.   -Current immunosuppression:Prednisone 20mg BID, MMF 1000 BID (CMV high risk); Prograf 3/4. 2 Doses of Thymo given 12/18, 12/19 due to and creat of 3.3. Bactrim.   -No biopsy this week. Will plan for biopsy next week.   -Prophylaxis- Nystatin.  Valcyte to begin on 12/26   -Chest tubes removed. Pacer wires in place.

## 2019-12-24 NOTE — PLAN OF CARE
AAOx3, afebrile, no c/o pain. Requested prn ambien for sleep. Gtts infusing per order. Pt in lowest postion, side rails up x2, non skid footwear in place, call light within reach, pt verbalized understanding to call the nurse when needed. Hand hygiene practiced per protocol. Pt able to reposition self independently. Will continue to monitor.

## 2019-12-24 NOTE — PLAN OF CARE
-Pt free from fall or injury so far this shift. Instructed to call if assistance needed, verbalized understanding.   -Cardiac monitoring in progress, currently SR. Echo today, EF 75%.  gtt continued.   -BG checked AC/HS. Last .   -Lasix gtt down 10 mg/5 cc/hr. Creat 1.7. UO 1800 cc this shift.  -Denies pain or discomfort.   -Able to adjust position independently using sternal precautions.   -Afebrile. Hand hygiene reinforced. Daily labs monitored.   -Pulls self meds with 100% accuracy.

## 2019-12-24 NOTE — PROGRESS NOTES
SW dropped off information on the [place] along with a [facility] Financial Profile form for pt to fill out and return to SW on Thursday. Pt was away at an echo. SW left a v/m with pt explaining that Profile will be due on Thursday as SW will need to have it on file when pt goes to  or if pt needs to stay at the [facility] until a  apartment becomes available. BENIGNO explained that as of now, there is no apartment available at the  and that SW to find out more information on Thursday. SW left call back number where she can be reached, and will f/u with pt on Thursday. SW remains available.

## 2019-12-24 NOTE — PROGRESS NOTES
[facility]  Heart Transplant  Progress Note    Patient Name: Deborah Navas  MRN: [id number]  Admission Date: 11/13/2019  Hospital Length of Stay: 39 days  Attending Physician: Eric Antunez Jr.,*   Primary Care Provider: Primary Doctor No  Principal Problem:Status post heart transplant    Subjective:     Interval History: Slept well overnight and would like to continue with Ambien for sleep. Feels weak on her feet. Working well with PT/OT. Scr better at 1.7    Today's plan:   -Decrease Lasix gtt to 10 mg/hr  -Continue  and wean in the following 1-2 days  -Adjust Tacro dosing per levels (3/4)  -Will get an Echo today    Lines:   Midline: Right cephalic vein: 12/4/19  Pacer Wires: 12/16/19    Continuous Infusions:   DOBUTamine 5 mcg/kg/min (12/24/19 0644)    furosemide (LASIX) 2 mg/mL infusion (non-titrating) 20 mg/hr (12/24/19 0703)     Scheduled Meds:   aspirin  81 mg Oral Daily    atorvastatin  20 mg Oral Daily    ergocalciferol  50,000 Units Oral Daily    famotidine  20 mg Oral Daily    ferrous gluconate  324 mg Oral TID WM    gabapentin  100 mg Oral TID    mirtazapine  15 mg Oral QHS    mycophenolate  1,000 mg Oral BID    nystatin  500,000 Units Mouth/Throat QID (WM & HS)    polyethylene glycol  17 g Oral Daily    potassium chloride  40 mEq Oral Daily    [START ON 12/27/2019] predniSONE  10 mg Oral BID    [START ON 12/25/2019] predniSONE  15 mg Oral BID    predniSONE  20 mg Oral BID    sulfamethoxazole-trimethoprim 400-80mg  1 tablet Oral Daily    tacrolimus  2 mg Oral Daily AM    tacrolimus  3 mg Oral Daily PM    [START ON 12/26/2019] valGANciclovir  900 mg Oral Daily    venlafaxine  150 mg Oral Daily     PRN Meds:sodium chloride, sodium chloride, acetaminophen, bisacodyl, Dextrose 10% Bolus, Dextrose 10% Bolus, glucagon (human recombinant), glucose, glucose, insulin aspart U-100, ondansetron, ondansetron, oxyCODONE, oxyCODONE, zolpidem    Review of  patient's allergies indicates:   Allergen Reactions    Adhesive Blisters     Reaction to area in chest and up only    Codeine Itching     Objective:     Vital Signs (Most Recent):  Temp: 97.4 °F (36.3 °C) (12/24/19 0745)  Pulse: 94 (12/24/19 0745)  Resp: 16 (12/24/19 0745)  BP: 104/69 (12/24/19 0745)  SpO2: 98 % (12/24/19 0745) Vital Signs (24h Range):  Temp:  [97.4 °F (36.3 °C)-98 °F (36.7 °C)] 97.4 °F (36.3 °C)  Pulse:  [89-94] 94  Resp:  [14-20] 16  SpO2:  [97 %-99 %] 98 %  BP: ()/(64-69) 104/69     Patient Vitals for the past 72 hrs (Last 3 readings):   Weight   12/24/19 0745 104.3 kg (230 lb)   12/24/19 0644 104.3 kg (230 lb)   12/23/19 0500 103.4 kg (228 lb)     Body mass index is 37.12 kg/m².      Intake/Output Summary (Last 24 hours) at 12/24/2019 1152  Last data filed at 12/24/2019 0600  Gross per 24 hour   Intake 1287.5 ml   Output 2500 ml   Net -1212.5 ml       Hemodynamic Parameters:       Telemetry: reviewed: sinus tach: rates in 90's    Physical ExamConstitutional: laying in bed NAD  Head: Normocephalic and atraumatic.   Eyes: Conjunctivae are normal.   Neck: Neck supple. Left IJ in place  Cardiovascular: Regular rate and rhythm; S1, S2 normal.  No rub, gallop or murmer  Pulmonary/Chest: Effort normal. Chest tubes intact. Serosanguinous drainage. Pacing wires in place.    Abdominal: Soft. There is no tenderness.   Musculoskeletal: She exhibits no deformity. Right femoral access site covered with dressing. No issues.    Neurological: She is alert.   Skin: Skin is warm and dry.   Psychiatric: Mood and affect appropriate    Nursing note and vitals reviewed.    Significant Labs:  CBC:  Recent Labs   Lab 12/22/19  0530 12/23/19  0530 12/24/19  0655   WBC 6.40 8.25 8.27   RBC 2.87* 3.16* 2.92*   HGB 7.7* 8.5* 8.0*   HCT 25.5* 28.1* 25.7*   * 205 252   MCV 89 89 88   MCH 26.8* 26.9* 27.4   MCHC 30.2* 30.2* 31.1*     BNP:  Recent Labs   Lab 12/24/19  0655   *     CMP:  Recent Labs   Lab  12/22/19  0530  12/23/19  0530 12/23/19  1557 12/24/19  0655   *   < > 121* 206* 149*   CALCIUM 8.6*   < > 8.5* 8.6* 8.2*   ALBUMIN 2.8*  --  3.2*  --  2.8*   PROT 4.9*  --  5.6*  --  5.0*      < > 137 138 142   K 3.7   < > 3.5 3.8 3.4*   CO2 32*   < > 33* 29 32*   CL 96   < > 94* 96 99   BUN 48*   < > 47* 46* 49*   CREATININE 1.7*   < > 1.9* 1.9* 1.7*   ALKPHOS 61  --  66  --  66   ALT 37  --  35  --  29   AST 34  --  24  --  21   BILITOT 0.7  --  0.7  --  0.6    < > = values in this interval not displayed.      Coagulation:   Recent Labs   Lab 12/21/19  0530 12/22/19  0530 12/23/19  0530   INR 1.3* 1.4* 1.2     LDH:  No results for input(s): LDH in the last 72 hours.  Microbiology:  Microbiology Results (last 7 days)     ** No results found for the last 168 hours. **        have reviewed all pertinent labs within the past 24 hours.    Estimated Creatinine Clearance: 48.3 mL/min (A) (based on SCr of 1.7 mg/dL (H)).    Diagnostic Results:  I have reviewed all pertinent imaging results/findings within the past 24 hours.    Assessment and Plan:     48 yo WF with NICM, s/p HM3 implantation 9/10/19 (post up coarse uncomplicated with the exception of+ diaphragmatic stimulation of LV lead and pleural effusion with chest tube placement, atrial flutter with NADINE/DCCV), V-fib reported in setting of hypokalemia with appropriate shock from ICD on Amiodarone prior to LVAD placement; and recent hospitalizations for ventricular arrythmias; started on Mexilitine.  She was seen in EP clinic today and she continues to have multiple VT episodes with some ATP therapy, although no ICD shocks since starting mexiletine 10/24/2019. One slow VT episode 2.5hrs 11/3/2019. Patient asymptomatic with LVAD. On coumadin. No AFL since post-op event (paced out of rhythm 9/24/2019). CHB with 100% V pacing (LV lead off). She does not feel well. Dry heaving with mexiletine. Taking phenergan PRN. She has been told she is not a good VT RFA  candidate due to multiple VT loci.   She was seen in HTS clinic and reported 4 low flow alarms the last 4 nights.  She reports they occur in her sleep and last for only a few seconds.  By the time she wakes up; they quickly stop.  She does report to possibly being little volume overloaded.  She hasn't noticed weight gain at home but thinks she may have little extra fluid.  She denies SOB, chest pain, palpitations, LEGER. In review of her records: she was 223lbs on 10/24/19 during previous hospitalization and is 235lbs now.  Her lasix had previously been decreased to prn.     * Status post heart transplant  -Transplant Date 12/16/2019. HM 3 implanted 9/10/19 removed during transplant.    -HTS Primary.  -Transplanted by: Dr. Nuñez.  -CMV Status:D-/R+ .  -Rejection status: Low Risk.  -Hemodynamic support with:  5mcg/kg/mn. Continues on Lasix 10mg/hr. Will wean as tolerated.   -Current immunosuppression:Prednisone 20mg BID, MMF 1000 BID (CMV high risk); Prograf 3/4. 2 Doses of Thymo given 12/18, 12/19 due to and creat of 3.3. Bactrim.   -No biopsy this week. Will plan for biopsy next week.   -Prophylaxis- Nystatin.  Valcyte to begin on 12/26   -Chest tubes removed. Pacer wires in place.        GLO (acute kidney injury)  - Baseline creat was 1.3-1.6  - Acute elevation post transplant  - Will avoid nephrotoxins and monitor       CKD (chronic kidney disease)  - Baseline creat appears 1.3-1.6.   - see above GLO          Dyllan Herzog MD  Heart Transplant  [facility]

## 2019-12-24 NOTE — PT/OT/SLP PROGRESS
Occupational Therapy      Patient Name:  Deborah Navas   MRN:  [id number]    Patient NATHAN upon attempt at 1002.   Pt's plan of care for OT set for 3x/w at this time.   Will follow up 12/26 as appropriate.     JAGJIT Cox  12/24/2019

## 2019-12-24 NOTE — CARE UPDATE
BG goal 140 -180     BG is within goal with minimal need for PRN SQ Novolog insulin correction. Remains on steroid therapy. Will continue to follow.     Low Dose SQ Insulin Correction Scale.  BG Monitoring AC/HS     ** Please call Endocrine for any BG related issues **  ** Please notify Endocrine for any change and/or advance in diet**      Discharge Planning:   TBD. Please notify endocrinology prior to discharge.

## 2019-12-24 NOTE — NURSING
Patient identified by 2 identifiers. Denies previous reactions to blood transfusions, allergies reviewed & procedure explained.  IV in place to MAGNOLIA, flushed w/ 10cc NS pre & post contrast administration.  3cc Optison administered, echo images obtained.  Pt tolerated procedure well.

## 2019-12-24 NOTE — SUBJECTIVE & OBJECTIVE
Interval History: Slept well overnight and would like to continue with Ambien for sleep. Feels weak on her feet. Working well with PT/OT. Scr better at 1.7    Today's plan:   -Decrease Lasix gtt to 10 mg/hr  -Continue  and wean in the following 1-2 days  -Adjust Tacro dosing per levels (3/4)  -Will get an Echo today    Lines:   Midline: Right cephalic vein: 12/4/19  Pacer Wires: 12/16/19    Continuous Infusions:   DOBUTamine 5 mcg/kg/min (12/24/19 0644)    furosemide (LASIX) 2 mg/mL infusion (non-titrating) 20 mg/hr (12/24/19 0703)     Scheduled Meds:   aspirin  81 mg Oral Daily    atorvastatin  20 mg Oral Daily    ergocalciferol  50,000 Units Oral Daily    famotidine  20 mg Oral Daily    ferrous gluconate  324 mg Oral TID WM    gabapentin  100 mg Oral TID    mirtazapine  15 mg Oral QHS    mycophenolate  1,000 mg Oral BID    nystatin  500,000 Units Mouth/Throat QID (WM & HS)    polyethylene glycol  17 g Oral Daily    potassium chloride  40 mEq Oral Daily    [START ON 12/27/2019] predniSONE  10 mg Oral BID    [START ON 12/25/2019] predniSONE  15 mg Oral BID    predniSONE  20 mg Oral BID    sulfamethoxazole-trimethoprim 400-80mg  1 tablet Oral Daily    tacrolimus  2 mg Oral Daily AM    tacrolimus  3 mg Oral Daily PM    [START ON 12/26/2019] valGANciclovir  900 mg Oral Daily    venlafaxine  150 mg Oral Daily     PRN Meds:sodium chloride, sodium chloride, acetaminophen, bisacodyl, Dextrose 10% Bolus, Dextrose 10% Bolus, glucagon (human recombinant), glucose, glucose, insulin aspart U-100, ondansetron, ondansetron, oxyCODONE, oxyCODONE, zolpidem    Review of patient's allergies indicates:   Allergen Reactions    Adhesive Blisters     Reaction to area in chest and up only    Codeine Itching     Objective:     Vital Signs (Most Recent):  Temp: 97.4 °F (36.3 °C) (12/24/19 0745)  Pulse: 94 (12/24/19 0745)  Resp: 16 (12/24/19 0745)  BP: 104/69 (12/24/19 0745)  SpO2: 98 % (12/24/19 0745) Vital Signs  (24h Range):  Temp:  [97.4 °F (36.3 °C)-98 °F (36.7 °C)] 97.4 °F (36.3 °C)  Pulse:  [89-94] 94  Resp:  [14-20] 16  SpO2:  [97 %-99 %] 98 %  BP: ()/(64-69) 104/69     Patient Vitals for the past 72 hrs (Last 3 readings):   Weight   12/24/19 0745 104.3 kg (230 lb)   12/24/19 0644 104.3 kg (230 lb)   12/23/19 0500 103.4 kg (228 lb)     Body mass index is 37.12 kg/m².      Intake/Output Summary (Last 24 hours) at 12/24/2019 1152  Last data filed at 12/24/2019 0600  Gross per 24 hour   Intake 1287.5 ml   Output 2500 ml   Net -1212.5 ml       Hemodynamic Parameters:       Telemetry: reviewed: sinus tach: rates in 90's    Physical ExamConstitutional: laying in bed NAD  Head: Normocephalic and atraumatic.   Eyes: Conjunctivae are normal.   Neck: Neck supple. Left IJ in place  Cardiovascular: Regular rate and rhythm; S1, S2 normal.  No rub, gallop or murmer  Pulmonary/Chest: Effort normal. Chest tubes intact. Serosanguinous drainage. Pacing wires in place.    Abdominal: Soft. There is no tenderness.   Musculoskeletal: She exhibits no deformity. Right femoral access site covered with dressing. No issues.    Neurological: She is alert.   Skin: Skin is warm and dry.   Psychiatric: Mood and affect appropriate    Nursing note and vitals reviewed.    Significant Labs:  CBC:  Recent Labs   Lab 12/22/19  0530 12/23/19  0530 12/24/19  0655   WBC 6.40 8.25 8.27   RBC 2.87* 3.16* 2.92*   HGB 7.7* 8.5* 8.0*   HCT 25.5* 28.1* 25.7*   * 205 252   MCV 89 89 88   MCH 26.8* 26.9* 27.4   MCHC 30.2* 30.2* 31.1*     BNP:  Recent Labs   Lab 12/24/19  0655   *     CMP:  Recent Labs   Lab 12/22/19  0530  12/23/19  0530 12/23/19  1557 12/24/19  0655   *   < > 121* 206* 149*   CALCIUM 8.6*   < > 8.5* 8.6* 8.2*   ALBUMIN 2.8*  --  3.2*  --  2.8*   PROT 4.9*  --  5.6*  --  5.0*      < > 137 138 142   K 3.7   < > 3.5 3.8 3.4*   CO2 32*   < > 33* 29 32*   CL 96   < > 94* 96 99   BUN 48*   < > 47* 46* 49*   CREATININE  1.7*   < > 1.9* 1.9* 1.7*   ALKPHOS 61  --  66  --  66   ALT 37  --  35  --  29   AST 34  --  24  --  21   BILITOT 0.7  --  0.7  --  0.6    < > = values in this interval not displayed.      Coagulation:   Recent Labs   Lab 12/21/19  0530 12/22/19  0530 12/23/19  0530   INR 1.3* 1.4* 1.2     LDH:  No results for input(s): LDH in the last 72 hours.  Microbiology:  Microbiology Results (last 7 days)     ** No results found for the last 168 hours. **        have reviewed all pertinent labs within the past 24 hours.    Estimated Creatinine Clearance: 48.3 mL/min (A) (based on SCr of 1.7 mg/dL (H)).    Diagnostic Results:  I have reviewed all pertinent imaging results/findings within the past 24 hours.

## 2019-12-25 LAB
ALBUMIN SERPL BCP-MCNC: 3 G/DL (ref 3.5–5.2)
ALP SERPL-CCNC: 71 U/L (ref 55–135)
ALT SERPL W/O P-5'-P-CCNC: 31 U/L (ref 10–44)
ANION GAP SERPL CALC-SCNC: 11 MMOL/L (ref 8–16)
ANION GAP SERPL CALC-SCNC: 12 MMOL/L (ref 8–16)
AST SERPL-CCNC: 25 U/L (ref 10–40)
BASOPHILS # BLD AUTO: 0.01 K/UL (ref 0–0.2)
BASOPHILS NFR BLD: 0.1 % (ref 0–1.9)
BILIRUB SERPL-MCNC: 0.6 MG/DL (ref 0.1–1)
BUN SERPL-MCNC: 45 MG/DL (ref 6–20)
BUN SERPL-MCNC: 46 MG/DL (ref 6–20)
CALCIUM SERPL-MCNC: 8.2 MG/DL (ref 8.7–10.5)
CALCIUM SERPL-MCNC: 8.6 MG/DL (ref 8.7–10.5)
CHLORIDE SERPL-SCNC: 97 MMOL/L (ref 95–110)
CHLORIDE SERPL-SCNC: 99 MMOL/L (ref 95–110)
CO2 SERPL-SCNC: 29 MMOL/L (ref 23–29)
CO2 SERPL-SCNC: 30 MMOL/L (ref 23–29)
CREAT SERPL-MCNC: 1.8 MG/DL (ref 0.5–1.4)
CREAT SERPL-MCNC: 1.8 MG/DL (ref 0.5–1.4)
DIFFERENTIAL METHOD: ABNORMAL
EOSINOPHIL # BLD AUTO: 0 K/UL (ref 0–0.5)
EOSINOPHIL NFR BLD: 0.1 % (ref 0–8)
ERYTHROCYTE [DISTWIDTH] IN BLOOD BY AUTOMATED COUNT: 16.7 % (ref 11.5–14.5)
EST. GFR  (AFRICAN AMERICAN): 37.3 ML/MIN/1.73 M^2
EST. GFR  (AFRICAN AMERICAN): 37.3 ML/MIN/1.73 M^2
EST. GFR  (NON AFRICAN AMERICAN): 32.4 ML/MIN/1.73 M^2
EST. GFR  (NON AFRICAN AMERICAN): 32.4 ML/MIN/1.73 M^2
GLUCOSE SERPL-MCNC: 151 MG/DL (ref 70–110)
GLUCOSE SERPL-MCNC: 240 MG/DL (ref 70–110)
HCT VFR BLD AUTO: 26.7 % (ref 37–48.5)
HGB BLD-MCNC: 8.4 G/DL (ref 12–16)
IMM GRANULOCYTES # BLD AUTO: 0.26 K/UL (ref 0–0.04)
IMM GRANULOCYTES NFR BLD AUTO: 2.4 % (ref 0–0.5)
LYMPHOCYTES # BLD AUTO: 0.1 K/UL (ref 1–4.8)
LYMPHOCYTES NFR BLD: 0.6 % (ref 18–48)
MAGNESIUM SERPL-MCNC: 1.5 MG/DL (ref 1.6–2.6)
MCH RBC QN AUTO: 28 PG (ref 27–31)
MCHC RBC AUTO-ENTMCNC: 31.5 G/DL (ref 32–36)
MCV RBC AUTO: 89 FL (ref 82–98)
MONOCYTES # BLD AUTO: 0.5 K/UL (ref 0.3–1)
MONOCYTES NFR BLD: 4.7 % (ref 4–15)
NEUTROPHILS # BLD AUTO: 9.9 K/UL (ref 1.8–7.7)
NEUTROPHILS NFR BLD: 92.1 % (ref 38–73)
NRBC BLD-RTO: 0 /100 WBC
PLATELET # BLD AUTO: 304 K/UL (ref 150–350)
PMV BLD AUTO: 9.8 FL (ref 9.2–12.9)
POCT GLUCOSE: 147 MG/DL (ref 70–110)
POCT GLUCOSE: 202 MG/DL (ref 70–110)
POCT GLUCOSE: 203 MG/DL (ref 70–110)
POTASSIUM SERPL-SCNC: 3.7 MMOL/L (ref 3.5–5.1)
POTASSIUM SERPL-SCNC: 4.4 MMOL/L (ref 3.5–5.1)
PROT SERPL-MCNC: 5.2 G/DL (ref 6–8.4)
RBC # BLD AUTO: 3 M/UL (ref 4–5.4)
SODIUM SERPL-SCNC: 138 MMOL/L (ref 136–145)
SODIUM SERPL-SCNC: 140 MMOL/L (ref 136–145)
TACROLIMUS BLD-MCNC: 7.5 NG/ML (ref 5–15)
WBC # BLD AUTO: 10.79 K/UL (ref 3.9–12.7)

## 2019-12-25 PROCEDURE — 25000003 PHARM REV CODE 250: Performed by: INTERNAL MEDICINE

## 2019-12-25 PROCEDURE — 63600175 PHARM REV CODE 636 W HCPCS: Performed by: PHYSICIAN ASSISTANT

## 2019-12-25 PROCEDURE — 80048 BASIC METABOLIC PNL TOTAL CA: CPT

## 2019-12-25 PROCEDURE — 63600175 PHARM REV CODE 636 W HCPCS: Performed by: INTERNAL MEDICINE

## 2019-12-25 PROCEDURE — 25000003 PHARM REV CODE 250: Performed by: NURSE PRACTITIONER

## 2019-12-25 PROCEDURE — 80053 COMPREHEN METABOLIC PANEL: CPT

## 2019-12-25 PROCEDURE — 63600175 PHARM REV CODE 636 W HCPCS: Performed by: NURSE PRACTITIONER

## 2019-12-25 PROCEDURE — 25000003 PHARM REV CODE 250: Performed by: PHYSICIAN ASSISTANT

## 2019-12-25 PROCEDURE — 36415 COLL VENOUS BLD VENIPUNCTURE: CPT

## 2019-12-25 PROCEDURE — 99233 SBSQ HOSP IP/OBS HIGH 50: CPT | Mod: ,,, | Performed by: INTERNAL MEDICINE

## 2019-12-25 PROCEDURE — 20600001 HC STEP DOWN PRIVATE ROOM

## 2019-12-25 PROCEDURE — 83735 ASSAY OF MAGNESIUM: CPT

## 2019-12-25 PROCEDURE — 99233 PR SUBSEQUENT HOSPITAL CARE,LEVL III: ICD-10-PCS | Mod: ,,, | Performed by: INTERNAL MEDICINE

## 2019-12-25 PROCEDURE — 80197 ASSAY OF TACROLIMUS: CPT

## 2019-12-25 PROCEDURE — 94761 N-INVAS EAR/PLS OXIMETRY MLT: CPT

## 2019-12-25 PROCEDURE — 85025 COMPLETE CBC W/AUTO DIFF WBC: CPT

## 2019-12-25 RX ORDER — FUROSEMIDE 10 MG/ML
80 INJECTION INTRAMUSCULAR; INTRAVENOUS 2 TIMES DAILY
Status: DISCONTINUED | OUTPATIENT
Start: 2019-12-25 | End: 2019-12-26

## 2019-12-25 RX ADMIN — INSULIN ASPART 1 UNITS: 100 INJECTION, SOLUTION INTRAVENOUS; SUBCUTANEOUS at 10:12

## 2019-12-25 RX ADMIN — FERROUS GLUCONATE TAB 324 MG (37.5 MG ELEMENTAL IRON) 324 MG: 324 (37.5 FE) TAB at 08:12

## 2019-12-25 RX ADMIN — GABAPENTIN 100 MG: 100 CAPSULE ORAL at 08:12

## 2019-12-25 RX ADMIN — ASPIRIN 81 MG: 81 TABLET, COATED ORAL at 08:12

## 2019-12-25 RX ADMIN — FERROUS GLUCONATE TAB 324 MG (37.5 MG ELEMENTAL IRON) 324 MG: 324 (37.5 FE) TAB at 05:12

## 2019-12-25 RX ADMIN — NYSTATIN 500000 UNITS: 100000 SUSPENSION ORAL at 08:12

## 2019-12-25 RX ADMIN — GABAPENTIN 100 MG: 100 CAPSULE ORAL at 03:12

## 2019-12-25 RX ADMIN — MYCOPHENOLATE MOFETIL 1000 MG: 250 CAPSULE ORAL at 08:12

## 2019-12-25 RX ADMIN — DOBUTAMINE IN DEXTROSE 5 MCG/KG/MIN: 200 INJECTION, SOLUTION INTRAVENOUS at 03:12

## 2019-12-25 RX ADMIN — PREDNISONE 15 MG: 10 TABLET ORAL at 08:12

## 2019-12-25 RX ADMIN — NYSTATIN 500000 UNITS: 100000 SUSPENSION ORAL at 05:12

## 2019-12-25 RX ADMIN — POTASSIUM CHLORIDE 40 MEQ: 1500 TABLET, EXTENDED RELEASE ORAL at 08:12

## 2019-12-25 RX ADMIN — FAMOTIDINE 20 MG: 20 TABLET ORAL at 08:12

## 2019-12-25 RX ADMIN — PREDNISONE 20 MG: 10 TABLET ORAL at 08:12

## 2019-12-25 RX ADMIN — ZOLPIDEM TARTRATE 5 MG: 5 TABLET ORAL at 09:12

## 2019-12-25 RX ADMIN — FERROUS GLUCONATE TAB 324 MG (37.5 MG ELEMENTAL IRON) 324 MG: 324 (37.5 FE) TAB at 12:12

## 2019-12-25 RX ADMIN — MIRTAZAPINE 15 MG: 15 TABLET, FILM COATED ORAL at 08:12

## 2019-12-25 RX ADMIN — TACROLIMUS 3 MG: 1 CAPSULE ORAL at 08:12

## 2019-12-25 RX ADMIN — TACROLIMUS 4 MG: 1 CAPSULE ORAL at 05:12

## 2019-12-25 RX ADMIN — ERGOCALCIFEROL 50000 UNITS: 1.25 CAPSULE ORAL at 08:12

## 2019-12-25 RX ADMIN — ATORVASTATIN CALCIUM 20 MG: 20 TABLET, FILM COATED ORAL at 08:12

## 2019-12-25 RX ADMIN — VENLAFAXINE HYDROCHLORIDE 150 MG: 37.5 CAPSULE, EXTENDED RELEASE ORAL at 08:12

## 2019-12-25 RX ADMIN — FUROSEMIDE 80 MG: 10 INJECTION, SOLUTION INTRAMUSCULAR; INTRAVENOUS at 05:12

## 2019-12-25 RX ADMIN — SULFAMETHOXAZOLE AND TRIMETHOPRIM 1 TABLET: 400; 80 TABLET ORAL at 08:12

## 2019-12-25 RX ADMIN — POLYETHYLENE GLYCOL 3350 17 G: 17 POWDER, FOR SOLUTION ORAL at 08:12

## 2019-12-25 RX ADMIN — NYSTATIN 500000 UNITS: 100000 SUSPENSION ORAL at 12:12

## 2019-12-25 NOTE — ASSESSMENT & PLAN NOTE
-Transplant Date 12/16/2019. HM 3 implanted 9/10/19 removed during transplant.    -HTS Primary.  -Transplanted by: Dr. Nuñez.  -CMV Status:D-/R+ .  -Rejection status: Low Risk.  -Hemodynamic support with:  5mcg/kg/mn. Continues on Lasix IVP 80 mg BID as of 12/25.    -Current immunosuppression:Prednisone 15 mg BID, MMF 1000 BID (CMV high risk); Prograf 3/4. 2 Doses of Thymo given 12/18, 12/19 due to and creat of 3.3. On Bactrim.   -No biopsy this week. Will plan for biopsy next week.   -Prophylaxis- Nystatin.  Valcyte to begin on 12/26   -Chest tubes removed. Pacer wires in place.

## 2019-12-25 NOTE — PLAN OF CARE
VSS. Call light and call light in reach. Continuous lasix d/c & switched to P.O. Lasix. Dobutamine infusing @ 5 ml/kg/min. Dressing to the old chest tube site changed. No skin break down. Skid proof socks on. No issues today. WCTM.

## 2019-12-25 NOTE — PROGRESS NOTES
[facility]  Heart Transplant  Progress Note    Patient Name: Deborah Navas  MRN: [id number]  Admission Date: 11/13/2019  Hospital Length of Stay: 40 days  Attending Physician: Eric Antunez Jr.,*  Primary Care Provider: Primary Doctor No  Principal Problem:Status post heart transplant    Subjective:     Interval History: No issues overnight. Working well with PT/OT. Scr better at 1.7~1.8. Tacrolimus is 7.5. Will assess with AM labs for further need in titration of therapy.     Today's plan:   -Lasix infusion to IVP   - wean in the following 1-2 days  -Adjust Tacro dosing per levels: 7.5 today on  Tacro (3/4)    Lines:   Midline: Right cephalic vein: 12/4/19  Pacer Wires: 12/16/19    Continuous Infusions:   DOBUTamine 5 mcg/kg/min (12/24/19 1652)     Scheduled Meds:   aspirin  81 mg Oral Daily    atorvastatin  20 mg Oral Daily    ergocalciferol  50,000 Units Oral Daily    famotidine  20 mg Oral Daily    ferrous gluconate  324 mg Oral TID WM    furosemide  80 mg Intravenous BID    gabapentin  100 mg Oral TID    mirtazapine  15 mg Oral QHS    mycophenolate  1,000 mg Oral BID    nystatin  500,000 Units Mouth/Throat QID (WM & HS)    polyethylene glycol  17 g Oral Daily    potassium chloride  40 mEq Oral Daily    [START ON 12/27/2019] predniSONE  10 mg Oral BID    predniSONE  15 mg Oral BID    sulfamethoxazole-trimethoprim 400-80mg  1 tablet Oral Daily    tacrolimus  3 mg Oral Daily AM    tacrolimus  4 mg Oral Daily PM    [START ON 12/26/2019] valGANciclovir  450 mg Oral Daily    venlafaxine  150 mg Oral Daily     PRN Meds:Dextrose 10% Bolus, Dextrose 10% Bolus, glucagon (human recombinant), glucose, glucose, insulin aspart U-100, oxyCODONE, zolpidem    Review of patient's allergies indicates:   Allergen Reactions    Adhesive Blisters     Reaction to area in chest and up only    Codeine Itching     Objective:     Vital Signs (Most Recent):  Temp: 98.3 °F (36.8 °C)  (12/24/19 1940)  Pulse: 94 (12/25/19 0800)  Resp: 16 (12/25/19 0800)  BP: (!) 107/54 (12/25/19 0800)  SpO2: (!) 94 % (12/25/19 0800) Vital Signs (24h Range):  Temp:  [97.5 °F (36.4 °C)-98.3 °F (36.8 °C)] 98.3 °F (36.8 °C)  Pulse:  [88-94] 94  Resp:  [11-20] 16  SpO2:  [94 %-98 %] 94 %  BP: (100-136)/(51-67) 107/54     Patient Vitals for the past 72 hrs (Last 3 readings):   Weight   12/25/19 0330 103.9 kg (229 lb 2.7 oz)   12/24/19 0745 104.3 kg (230 lb)   12/24/19 0644 104.3 kg (230 lb)     Body mass index is 36.99 kg/m².      Intake/Output Summary (Last 24 hours) at 12/25/2019 1035  Last data filed at 12/25/2019 0340  Gross per 24 hour   Intake 965.51 ml   Output 3250 ml   Net -2284.49 ml       Hemodynamic Parameters:       Telemetry: reviewed: sinus tach: rates in 90's    Physical Exam   Constitutional: She is oriented to person, place, and time. She appears well-developed and well-nourished.   HENT:   Head: Normocephalic.   Neck: Normal range of motion. JVD present.   Cardiovascular: Normal rate, regular rhythm and normal heart sounds.   No murmur heard.  Pulmonary/Chest: Effort normal and breath sounds normal.   Healing post surgical sternotomy wound    Abdominal: Soft. Bowel sounds are normal.   Musculoskeletal: Normal range of motion.   Neurological: She is alert and oriented to person, place, and time.   Skin: Skin is warm and dry. Capillary refill takes less than 2 seconds.   Constitutional: laying in bed NAD  Head: Normocephalic and atraumatic.   Eyes: Conjunctivae are normal.   Neck: Neck supple. Left IJ in place  Cardiovascular: Regular rate and rhythm; S1, S2 normal.  No rub, gallop or murmer  Pulmonary/Chest: Effort normal. Chest tubes intact. Serosanguinous drainage. Pacing wires in place.    Abdominal: Soft. There is no tenderness.   Musculoskeletal: She exhibits no deformity. Right femoral access site covered with dressing. No issues.    Neurological: She is alert.   Skin: Skin is warm and dry.    Psychiatric: Mood and affect appropriate    Nursing note and vitals reviewed.    Significant Labs:  CBC:  Recent Labs   Lab 12/23/19  0530 12/24/19  0655 12/25/19  0617   WBC 8.25 8.27 10.79   RBC 3.16* 2.92* 3.00*   HGB 8.5* 8.0* 8.4*   HCT 28.1* 25.7* 26.7*    252 304   MCV 89 88 89   MCH 26.9* 27.4 28.0   MCHC 30.2* 31.1* 31.5*     BNP:  Recent Labs   Lab 12/24/19  0655   *     CMP:  Recent Labs   Lab 12/23/19  0530  12/24/19  0655 12/24/19  1527 12/25/19  0617   *   < > 149* 185* 151*   CALCIUM 8.5*   < > 8.2* 8.5* 8.2*   ALBUMIN 3.2*  --  2.8*  --  3.0*   PROT 5.6*  --  5.0*  --  5.2*      < > 142 141 138   K 3.5   < > 3.4* 3.9 3.7   CO2 33*   < > 32* 27 30*   CL 94*   < > 99 99 97   BUN 47*   < > 49* 45* 46*   CREATININE 1.9*   < > 1.7* 1.8* 1.8*   ALKPHOS 66  --  66  --  71   ALT 35  --  29  --  31   AST 24  --  21  --  25   BILITOT 0.7  --  0.6  --  0.6    < > = values in this interval not displayed.      Coagulation:   Recent Labs   Lab 12/21/19  0530 12/22/19  0530 12/23/19  0530   INR 1.3* 1.4* 1.2     LDH:  No results for input(s): LDH in the last 72 hours.  Microbiology:  Microbiology Results (last 7 days)     ** No results found for the last 168 hours. **        have reviewed all pertinent labs within the past 24 hours.    Estimated Creatinine Clearance: 45.6 mL/min (A) (based on SCr of 1.8 mg/dL (H)).    Diagnostic Results:  I have reviewed all pertinent imaging results/findings within the past 24 hours.    Assessment and Plan:     50 yo WF with NICM, s/p HM3 implantation 9/10/19 (post up coarse uncomplicated with the exception of+ diaphragmatic stimulation of LV lead and pleural effusion with chest tube placement, atrial flutter with NADINE/DCCV), V-fib reported in setting of hypokalemia with appropriate shock from ICD on Amiodarone prior to LVAD placement; and recent hospitalizations for ventricular arrythmias; started on Mexilitine.  She was seen in EP clinic today and she  continues to have multiple VT episodes with some ATP therapy, although no ICD shocks since starting mexiletine 10/24/2019. One slow VT episode 2.5hrs 11/3/2019. Patient asymptomatic with LVAD. On coumadin. No AFL since post-op event (paced out of rhythm 9/24/2019). CHB with 100% V pacing (LV lead off). She does not feel well. Dry heaving with mexiletine. Taking phenergan PRN. 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   She was seen in HTS clinic and reported 4 low flow alarms the last 4 nights.  She reports they occur in her sleep and last for only a few seconds.  By the time she wakes up; they quickly stop.  She does report to possibly being little volume overloaded.  She hasn't noticed weight gain at home but thinks she may have little extra fluid.  She denies SOB, chest pain, palpitations, LEGER. In review of her records: she was 223lbs on 10/24/19 during previous hospitalization and is 235lbs now.  Her lasix had previously been decreased to prn.     * Status post heart transplant  -Transplant Date 12/16/2019. HM 3 implanted 9/10/19 removed during transplant.    -HTS Primary.  -Transplanted by: Dr. Nuñez.  -CMV Status:D-/R+ .  -Rejection status: Low Risk.  -Hemodynamic support with:  5mcg/kg/mn. Continues on Lasix IVP 80 mg BID as of 12/25.    -Current immunosuppression:Prednisone 15 mg BID, MMF 1000 BID (CMV high risk); Prograf 3/4. 2 Doses of Thymo given 12/18, 12/19 due to and creat of 3.3. On Bactrim.   -No biopsy this week. Will plan for biopsy next week.   -Prophylaxis- Nystatin.  Valcyte to begin on 12/26   -Chest tubes removed. Pacer wires in place.        GLO (acute kidney injury)  - Baseline creat was 1.3-1.6  - Acute elevation post transplant  - Will avoid nephrotoxins and monitor       CKD (chronic kidney disease)  - Baseline creat appears 1.3-1.6.   - see above GLO          Dyllan Herozg MD  Heart Transplant  [facility]

## 2019-12-25 NOTE — CARE UPDATE
BG goal 140 -180      BG is within goal with minimal need for PRN SQ Novolog insulin correction. Remains on steroid therapy. Will continue to follow. However, will consider sign off if patient remains within BG goals, and is able to tolerate increase in caloric intake in conjunction with steroid therapy.      Low Dose SQ Insulin Correction Scale.  BG Monitoring AC/HS      ** Please call Endocrine for any BG related issues **  ** Please notify Endocrine for any change and/or advance in diet**        Discharge Planning:   TBD. Please notify endocrinology prior to discharge.

## 2019-12-25 NOTE — SUBJECTIVE & OBJECTIVE
Interval History: No issues overnight. Working well with PT/OT. Scr better at 1.7~1.8. Tacrolimus is 7.5. Will assess with AM labs for further need in titration of therapy.     Today's plan:   -Lasix infusion to IVP   - wean in the following 1-2 days  -Adjust Tacro dosing per levels: 7.5 today on  Tacro (3/4)    Lines:   Midline: Right cephalic vein: 12/4/19  Pacer Wires: 12/16/19    Continuous Infusions:   DOBUTamine 5 mcg/kg/min (12/24/19 2215)     Scheduled Meds:   aspirin  81 mg Oral Daily    atorvastatin  20 mg Oral Daily    ergocalciferol  50,000 Units Oral Daily    famotidine  20 mg Oral Daily    ferrous gluconate  324 mg Oral TID WM    furosemide  80 mg Intravenous BID    gabapentin  100 mg Oral TID    mirtazapine  15 mg Oral QHS    mycophenolate  1,000 mg Oral BID    nystatin  500,000 Units Mouth/Throat QID (WM & HS)    polyethylene glycol  17 g Oral Daily    potassium chloride  40 mEq Oral Daily    [START ON 12/27/2019] predniSONE  10 mg Oral BID    predniSONE  15 mg Oral BID    sulfamethoxazole-trimethoprim 400-80mg  1 tablet Oral Daily    tacrolimus  3 mg Oral Daily AM    tacrolimus  4 mg Oral Daily PM    [START ON 12/26/2019] valGANciclovir  450 mg Oral Daily    venlafaxine  150 mg Oral Daily     PRN Meds:Dextrose 10% Bolus, Dextrose 10% Bolus, glucagon (human recombinant), glucose, glucose, insulin aspart U-100, oxyCODONE, zolpidem    Review of patient's allergies indicates:   Allergen Reactions    Adhesive Blisters     Reaction to area in chest and up only    Codeine Itching     Objective:     Vital Signs (Most Recent):  Temp: 98.3 °F (36.8 °C) (12/24/19 1940)  Pulse: 94 (12/25/19 0800)  Resp: 16 (12/25/19 0800)  BP: (!) 107/54 (12/25/19 0800)  SpO2: (!) 94 % (12/25/19 0800) Vital Signs (24h Range):  Temp:  [97.5 °F (36.4 °C)-98.3 °F (36.8 °C)] 98.3 °F (36.8 °C)  Pulse:  [88-94] 94  Resp:  [11-20] 16  SpO2:  [94 %-98 %] 94 %  BP: (100-136)/(51-67) 107/54     Patient Vitals for  the past 72 hrs (Last 3 readings):   Weight   12/25/19 0330 103.9 kg (229 lb 2.7 oz)   12/24/19 0745 104.3 kg (230 lb)   12/24/19 0644 104.3 kg (230 lb)     Body mass index is 36.99 kg/m².      Intake/Output Summary (Last 24 hours) at 12/25/2019 1035  Last data filed at 12/25/2019 0340  Gross per 24 hour   Intake 965.51 ml   Output 3250 ml   Net -2284.49 ml       Hemodynamic Parameters:       Telemetry: reviewed: sinus tach: rates in 90's    Physical Exam   Constitutional: She is oriented to person, place, and time. She appears well-developed and well-nourished.   HENT:   Head: Normocephalic.   Neck: Normal range of motion. JVD present.   Cardiovascular: Normal rate, regular rhythm and normal heart sounds.   No murmur heard.  Pulmonary/Chest: Effort normal and breath sounds normal.   Healing post surgical sternotomy wound    Abdominal: Soft. Bowel sounds are normal.   Musculoskeletal: Normal range of motion.   Neurological: She is alert and oriented to person, place, and time.   Skin: Skin is warm and dry. Capillary refill takes less than 2 seconds.   Constitutional: laying in bed NAD  Head: Normocephalic and atraumatic.   Eyes: Conjunctivae are normal.   Neck: Neck supple. Left IJ in place  Cardiovascular: Regular rate and rhythm; S1, S2 normal.  No rub, gallop or murmer  Pulmonary/Chest: Effort normal. Chest tubes intact. Serosanguinous drainage. Pacing wires in place.    Abdominal: Soft. There is no tenderness.   Musculoskeletal: She exhibits no deformity. Right femoral access site covered with dressing. No issues.    Neurological: She is alert.   Skin: Skin is warm and dry.   Psychiatric: Mood and affect appropriate    Nursing note and vitals reviewed.    Significant Labs:  CBC:  Recent Labs   Lab 12/23/19  0530 12/24/19  0655 12/25/19  0617   WBC 8.25 8.27 10.79   RBC 3.16* 2.92* 3.00*   HGB 8.5* 8.0* 8.4*   HCT 28.1* 25.7* 26.7*    252 304   MCV 89 88 89   MCH 26.9* 27.4 28.0   MCHC 30.2* 31.1* 31.5*      BNP:  Recent Labs   Lab 12/24/19  0655   *     CMP:  Recent Labs   Lab 12/23/19  0530  12/24/19  0655 12/24/19  1527 12/25/19  0617   *   < > 149* 185* 151*   CALCIUM 8.5*   < > 8.2* 8.5* 8.2*   ALBUMIN 3.2*  --  2.8*  --  3.0*   PROT 5.6*  --  5.0*  --  5.2*      < > 142 141 138   K 3.5   < > 3.4* 3.9 3.7   CO2 33*   < > 32* 27 30*   CL 94*   < > 99 99 97   BUN 47*   < > 49* 45* 46*   CREATININE 1.9*   < > 1.7* 1.8* 1.8*   ALKPHOS 66  --  66  --  71   ALT 35  --  29  --  31   AST 24  --  21  --  25   BILITOT 0.7  --  0.6  --  0.6    < > = values in this interval not displayed.      Coagulation:   Recent Labs   Lab 12/21/19  0530 12/22/19  0530 12/23/19  0530   INR 1.3* 1.4* 1.2     LDH:  No results for input(s): LDH in the last 72 hours.  Microbiology:  Microbiology Results (last 7 days)     ** No results found for the last 168 hours. **        have reviewed all pertinent labs within the past 24 hours.    Estimated Creatinine Clearance: 45.6 mL/min (A) (based on SCr of 1.8 mg/dL (H)).    Diagnostic Results:  I have reviewed all pertinent imaging results/findings within the past 24 hours.

## 2019-12-26 ENCOUNTER — RESEARCH ENCOUNTER (OUTPATIENT)
Dept: RESEARCH | Facility: HOSPITAL | Age: 49
End: 2019-12-26

## 2019-12-26 DIAGNOSIS — Z94.1 HEART REPLACED BY TRANSPLANT: Primary | ICD-10-CM

## 2019-12-26 LAB
ALBUMIN SERPL BCP-MCNC: 2.8 G/DL (ref 3.5–5.2)
ALP SERPL-CCNC: 76 U/L (ref 55–135)
ALT SERPL W/O P-5'-P-CCNC: 30 U/L (ref 10–44)
ANION GAP SERPL CALC-SCNC: 10 MMOL/L (ref 8–16)
ANION GAP SERPL CALC-SCNC: 9 MMOL/L (ref 8–16)
AST SERPL-CCNC: 24 U/L (ref 10–40)
BASOPHILS # BLD AUTO: 0.01 K/UL (ref 0–0.2)
BASOPHILS NFR BLD: 0.1 % (ref 0–1.9)
BILIRUB SERPL-MCNC: 0.6 MG/DL (ref 0.1–1)
BUN SERPL-MCNC: 43 MG/DL (ref 6–20)
BUN SERPL-MCNC: 44 MG/DL (ref 6–20)
CALCIUM SERPL-MCNC: 8.5 MG/DL (ref 8.7–10.5)
CALCIUM SERPL-MCNC: 9 MG/DL (ref 8.7–10.5)
CHLORIDE SERPL-SCNC: 101 MMOL/L (ref 95–110)
CHLORIDE SERPL-SCNC: 99 MMOL/L (ref 95–110)
CO2 SERPL-SCNC: 29 MMOL/L (ref 23–29)
CO2 SERPL-SCNC: 30 MMOL/L (ref 23–29)
CREAT SERPL-MCNC: 1.7 MG/DL (ref 0.5–1.4)
CREAT SERPL-MCNC: 1.8 MG/DL (ref 0.5–1.4)
DIFFERENTIAL METHOD: ABNORMAL
EOSINOPHIL # BLD AUTO: 0 K/UL (ref 0–0.5)
EOSINOPHIL NFR BLD: 0 % (ref 0–8)
ERYTHROCYTE [DISTWIDTH] IN BLOOD BY AUTOMATED COUNT: 16.8 % (ref 11.5–14.5)
EST. GFR  (AFRICAN AMERICAN): 37.3 ML/MIN/1.73 M^2
EST. GFR  (AFRICAN AMERICAN): 40 ML/MIN/1.73 M^2
EST. GFR  (NON AFRICAN AMERICAN): 32.4 ML/MIN/1.73 M^2
EST. GFR  (NON AFRICAN AMERICAN): 34.7 ML/MIN/1.73 M^2
GLUCOSE SERPL-MCNC: 155 MG/DL (ref 70–110)
GLUCOSE SERPL-MCNC: 157 MG/DL (ref 70–110)
HCT VFR BLD AUTO: 27.6 % (ref 37–48.5)
HGB BLD-MCNC: 8.3 G/DL (ref 12–16)
IMM GRANULOCYTES # BLD AUTO: 0.28 K/UL (ref 0–0.04)
IMM GRANULOCYTES NFR BLD AUTO: 1.8 % (ref 0–0.5)
LYMPHOCYTES # BLD AUTO: 0.1 K/UL (ref 1–4.8)
LYMPHOCYTES NFR BLD: 0.5 % (ref 18–48)
MAGNESIUM SERPL-MCNC: 1.6 MG/DL (ref 1.6–2.6)
MCH RBC QN AUTO: 27.2 PG (ref 27–31)
MCHC RBC AUTO-ENTMCNC: 30.1 G/DL (ref 32–36)
MCV RBC AUTO: 91 FL (ref 82–98)
MONOCYTES # BLD AUTO: 0.6 K/UL (ref 0.3–1)
MONOCYTES NFR BLD: 3.8 % (ref 4–15)
NEUTROPHILS # BLD AUTO: 14.2 K/UL (ref 1.8–7.7)
NEUTROPHILS NFR BLD: 93.8 % (ref 38–73)
NRBC BLD-RTO: 0 /100 WBC
PLATELET # BLD AUTO: 337 K/UL (ref 150–350)
PMV BLD AUTO: 9.8 FL (ref 9.2–12.9)
POCT GLUCOSE: 156 MG/DL (ref 70–110)
POCT GLUCOSE: 163 MG/DL (ref 70–110)
POCT GLUCOSE: 222 MG/DL (ref 70–110)
POCT GLUCOSE: 223 MG/DL (ref 70–110)
POCT GLUCOSE: 226 MG/DL (ref 70–110)
POTASSIUM SERPL-SCNC: 4.3 MMOL/L (ref 3.5–5.1)
POTASSIUM SERPL-SCNC: 4.6 MMOL/L (ref 3.5–5.1)
PROT SERPL-MCNC: 5.2 G/DL (ref 6–8.4)
RBC # BLD AUTO: 3.05 M/UL (ref 4–5.4)
SODIUM SERPL-SCNC: 139 MMOL/L (ref 136–145)
SODIUM SERPL-SCNC: 139 MMOL/L (ref 136–145)
TACROLIMUS BLD-MCNC: 9.3 NG/ML (ref 5–15)
WBC # BLD AUTO: 15.19 K/UL (ref 3.9–12.7)

## 2019-12-26 PROCEDURE — 80053 COMPREHEN METABOLIC PANEL: CPT

## 2019-12-26 PROCEDURE — 85025 COMPLETE CBC W/AUTO DIFF WBC: CPT

## 2019-12-26 PROCEDURE — 25000003 PHARM REV CODE 250: Performed by: STUDENT IN AN ORGANIZED HEALTH CARE EDUCATION/TRAINING PROGRAM

## 2019-12-26 PROCEDURE — 83735 ASSAY OF MAGNESIUM: CPT

## 2019-12-26 PROCEDURE — 25000003 PHARM REV CODE 250: Performed by: INTERNAL MEDICINE

## 2019-12-26 PROCEDURE — 80048 BASIC METABOLIC PNL TOTAL CA: CPT

## 2019-12-26 PROCEDURE — 25000003 PHARM REV CODE 250: Performed by: NURSE PRACTITIONER

## 2019-12-26 PROCEDURE — 36415 COLL VENOUS BLD VENIPUNCTURE: CPT

## 2019-12-26 PROCEDURE — 97530 THERAPEUTIC ACTIVITIES: CPT

## 2019-12-26 PROCEDURE — 63600175 PHARM REV CODE 636 W HCPCS: Performed by: INTERNAL MEDICINE

## 2019-12-26 PROCEDURE — 25000003 PHARM REV CODE 250: Performed by: PHYSICIAN ASSISTANT

## 2019-12-26 PROCEDURE — 99233 PR SUBSEQUENT HOSPITAL CARE,LEVL III: ICD-10-PCS | Mod: ,,, | Performed by: INTERNAL MEDICINE

## 2019-12-26 PROCEDURE — 20600001 HC STEP DOWN PRIVATE ROOM

## 2019-12-26 PROCEDURE — 63600175 PHARM REV CODE 636 W HCPCS: Performed by: PHYSICIAN ASSISTANT

## 2019-12-26 PROCEDURE — 94761 N-INVAS EAR/PLS OXIMETRY MLT: CPT

## 2019-12-26 PROCEDURE — 80197 ASSAY OF TACROLIMUS: CPT

## 2019-12-26 PROCEDURE — 99233 SBSQ HOSP IP/OBS HIGH 50: CPT | Mod: ,,, | Performed by: INTERNAL MEDICINE

## 2019-12-26 PROCEDURE — 97110 THERAPEUTIC EXERCISES: CPT

## 2019-12-26 PROCEDURE — 97116 GAIT TRAINING THERAPY: CPT

## 2019-12-26 RX ORDER — FUROSEMIDE 40 MG/1
80 TABLET ORAL 2 TIMES DAILY
Status: DISCONTINUED | OUTPATIENT
Start: 2019-12-26 | End: 2019-12-30 | Stop reason: HOSPADM

## 2019-12-26 RX ORDER — OXYCODONE HYDROCHLORIDE 10 MG/1
10 TABLET ORAL EVERY 6 HOURS PRN
Status: DISCONTINUED | OUTPATIENT
Start: 2019-12-26 | End: 2019-12-27

## 2019-12-26 RX ORDER — ACETAMINOPHEN 325 MG/1
650 TABLET ORAL EVERY 6 HOURS PRN
Status: DISCONTINUED | OUTPATIENT
Start: 2019-12-26 | End: 2019-12-30 | Stop reason: HOSPADM

## 2019-12-26 RX ADMIN — FERROUS GLUCONATE TAB 324 MG (37.5 MG ELEMENTAL IRON) 324 MG: 324 (37.5 FE) TAB at 06:12

## 2019-12-26 RX ADMIN — OXYCODONE HYDROCHLORIDE 5 MG: 5 TABLET ORAL at 11:12

## 2019-12-26 RX ADMIN — FUROSEMIDE 80 MG: 10 INJECTION, SOLUTION INTRAMUSCULAR; INTRAVENOUS at 09:12

## 2019-12-26 RX ADMIN — PREDNISONE 15 MG: 10 TABLET ORAL at 09:12

## 2019-12-26 RX ADMIN — GABAPENTIN 100 MG: 100 CAPSULE ORAL at 12:12

## 2019-12-26 RX ADMIN — FUROSEMIDE 80 MG: 40 TABLET ORAL at 06:12

## 2019-12-26 RX ADMIN — ZOLPIDEM TARTRATE 5 MG: 5 TABLET ORAL at 09:12

## 2019-12-26 RX ADMIN — FAMOTIDINE 20 MG: 20 TABLET ORAL at 09:12

## 2019-12-26 RX ADMIN — ACETAMINOPHEN 650 MG: 325 TABLET ORAL at 10:12

## 2019-12-26 RX ADMIN — ASPIRIN 81 MG: 81 TABLET, COATED ORAL at 09:12

## 2019-12-26 RX ADMIN — INSULIN ASPART 2 UNITS: 100 INJECTION, SOLUTION INTRAVENOUS; SUBCUTANEOUS at 12:12

## 2019-12-26 RX ADMIN — NYSTATIN 500000 UNITS: 100000 SUSPENSION ORAL at 09:12

## 2019-12-26 RX ADMIN — DOBUTAMINE IN DEXTROSE 5 MCG/KG/MIN: 200 INJECTION, SOLUTION INTRAVENOUS at 06:12

## 2019-12-26 RX ADMIN — OXYCODONE HYDROCHLORIDE 5 MG: 5 TABLET ORAL at 03:12

## 2019-12-26 RX ADMIN — OXYCODONE HYDROCHLORIDE 10 MG: 10 TABLET ORAL at 08:12

## 2019-12-26 RX ADMIN — ERGOCALCIFEROL 50000 UNITS: 1.25 CAPSULE ORAL at 09:12

## 2019-12-26 RX ADMIN — MYCOPHENOLATE MOFETIL 1000 MG: 250 CAPSULE ORAL at 09:12

## 2019-12-26 RX ADMIN — TACROLIMUS 3 MG: 1 CAPSULE ORAL at 09:12

## 2019-12-26 RX ADMIN — FERROUS GLUCONATE TAB 324 MG (37.5 MG ELEMENTAL IRON) 324 MG: 324 (37.5 FE) TAB at 12:12

## 2019-12-26 RX ADMIN — MIRTAZAPINE 15 MG: 15 TABLET, FILM COATED ORAL at 09:12

## 2019-12-26 RX ADMIN — TACROLIMUS 4 MG: 1 CAPSULE ORAL at 06:12

## 2019-12-26 RX ADMIN — NYSTATIN 500000 UNITS: 100000 SUSPENSION ORAL at 06:12

## 2019-12-26 RX ADMIN — POTASSIUM CHLORIDE 40 MEQ: 1500 TABLET, EXTENDED RELEASE ORAL at 09:12

## 2019-12-26 RX ADMIN — INSULIN ASPART 1 UNITS: 100 INJECTION, SOLUTION INTRAVENOUS; SUBCUTANEOUS at 10:12

## 2019-12-26 RX ADMIN — VALGANCICLOVIR 450 MG: 450 TABLET, FILM COATED ORAL at 09:12

## 2019-12-26 RX ADMIN — NYSTATIN 500000 UNITS: 100000 SUSPENSION ORAL at 12:12

## 2019-12-26 RX ADMIN — SULFAMETHOXAZOLE AND TRIMETHOPRIM 1 TABLET: 400; 80 TABLET ORAL at 09:12

## 2019-12-26 RX ADMIN — VENLAFAXINE HYDROCHLORIDE 150 MG: 37.5 CAPSULE, EXTENDED RELEASE ORAL at 09:12

## 2019-12-26 RX ADMIN — FERROUS GLUCONATE TAB 324 MG (37.5 MG ELEMENTAL IRON) 324 MG: 324 (37.5 FE) TAB at 09:12

## 2019-12-26 RX ADMIN — GABAPENTIN 100 MG: 100 CAPSULE ORAL at 09:12

## 2019-12-26 RX ADMIN — OXYCODONE HYDROCHLORIDE 5 MG: 5 TABLET ORAL at 07:12

## 2019-12-26 RX ADMIN — POLYETHYLENE GLYCOL 3350 17 G: 17 POWDER, FOR SOLUTION ORAL at 09:12

## 2019-12-26 RX ADMIN — ATORVASTATIN CALCIUM 20 MG: 20 TABLET, FILM COATED ORAL at 09:12

## 2019-12-26 NOTE — ASSESSMENT & PLAN NOTE
-Transplant Date 12/16/2019. HM 3 implanted 9/10/19 removed during transplant.    -HTS Primary.  -Transplanted by: Dr. Nuñez.  -CMV Status:D-/R+ .  -Rejection status: Low Risk.  -Hemodynamic support with:  5mcg/kg/mn and will start to wean. Continues on Lasix IVP to PO 80 mg BID as of 12/26.    -Current immunosuppression:Prednisone 15 mg BID, MMF 1000 BID (CMV high risk); Prograf 3/4. 2 Doses of Thymo given 12/18, 12/19 due to and creat of 3.3. On Bactrim.   -No biopsy this week. Will plan for biopsy next week.   -Prophylaxis- Nystatin.  Valcyte to begin on 12/26   -Chest tubes removed. Pacer wires in place.

## 2019-12-26 NOTE — PT/OT/SLP PROGRESS
Occupational Therapy   Treatment    Name: Deborah Navas  MRN: [id number]  Admitting Diagnosis:  Status post heart transplant  10 Days Post-Op    Recommendations:     Discharge Recommendations: home health OT  Discharge Equipment Recommendations:  none  Barriers to discharge:  None    Assessment:     Deborah Navas is a 49 y.o. female with a medical diagnosis of Status post heart transplant. Progressing well - reports near (I) with ADLs. Continues with poor endurance with activities and walking requiring rest breaks. Performance deficits affecting function are weakness, impaired endurance, impaired balance, gait instability, impaired self care skills, impaired functional mobilty, decreased coordination, orthopedic precautions.     Rehab Prognosis:  Good; patient would benefit from acute skilled OT services to address these deficits and reach maximum level of function.       Plan:     Patient to be seen 3 x/week to address the above listed problems via self-care/home management, therapeutic activities, therapeutic exercises  · Plan of Care Expires: 01/18/20  · Plan of Care Reviewed with: patient, family    Subjective     Pain/Comfort:  · Pain Rating 1: 0/10    Objective:     Communicated with: rn prior to session.  Patient found EOB with   upon OT entry to room.    General Precautions: Standard, sternal   Orthopedic Precautions:N/A   Braces:       Occupational Performance:     Functional Mobility/Transfers:  · Patient completed Sit <> Stand Transfer with supervision  with  no assistive device   · Functional Mobility: (S) with HHA ~ 75' x 2 with 1 standing rest.    [facility] 6 Click ADL: 22    Treatment & Education:  Educated on sitting EOB or OOB for improved healing and walking as able with staff.  Discussed OT POC.    Patient left EOB with call button in reachEducation:      GOALS:   Multidisciplinary Problems     Occupational Therapy Goals        Problem: Occupational Therapy Goal    Goal Priority  Disciplines Outcome Interventions   Occupational Therapy Goal     OT, PT/OT Ongoing, Progressing    Description:  Goals to be met by: 1/2/19     Patient will increase functional independence with ADLs by performing:    UE Dressing with Modified [place].  LE Dressing with Modified [place].  Grooming while standing at sink with Modified [place] .  Toileting from toilet with Modified [place] for hygiene and clothing management.   Toilet transfers to toilet with Modified [place]                       Time Tracking:     OT Date of Treatment: 12/26/19  OT Start Time: 1315  OT Stop Time: 1330  OT Total Time (min): 15 min    Billable Minutes:Therapeutic Activity 15    JAGJIT Jensen  12/26/2019

## 2019-12-26 NOTE — CARE UPDATE
BG goal 140 -180      BG is within goal with minimal need for PRN SQ Novolog insulin correction. Remains on steroid therapy. Will continue to follow.  will consider sign off if patient remains within BG goals, and is able to tolerate increase in caloric intake in conjunction with steroid therapy.      Low Dose SQ Insulin Correction Scale.  BG Monitoring AC/HS      ** Please call Endocrine for any BG related issues **  ** Please notify Endocrine for any change and/or advance in diet**        Discharge Planning:   TBD. Please notify endocrinology prior to discharge.

## 2019-12-26 NOTE — PROGRESS NOTES
[facility]  Heart Transplant  Progress Note    Patient Name: Deborah Navas  MRN: [id number]  Admission Date: 11/13/2019  Hospital Length of Stay: 41 days  Attending Physician: Eric Antunez Jr.,*  Primary Care Provider: Primary Doctor No  Principal Problem:Status post heart transplant    Subjective:     Interval History: No issues overnight but feeling anterior and posterior chest discomfort, described as muscle ache. Working with PT/OT. Scr better at 1.7. Tacrolimus is 9.3.     Today's plan:   -Lasix IVP to PO 80 mg BID  - wean to 2.5 then off by tonight if clinically tolerating  -Tacro levels up to 9.3 and will continue current dosing (3/4)  -CXR to evaluate chest complaint as WBC (denies fever, cough, pleuresis)    Lines:   Midline: Right cephalic vein: 12/4/19  Pacer Wires: 12/16/19    Continuous Infusions:   DOBUTamine 2.5 mcg/kg/min (12/26/19 0958)     Scheduled Meds:   aspirin  81 mg Oral Daily    atorvastatin  20 mg Oral Daily    ergocalciferol  50,000 Units Oral Daily    famotidine  20 mg Oral Daily    ferrous gluconate  324 mg Oral TID WM    furosemide  80 mg Oral BID    gabapentin  100 mg Oral TID    mirtazapine  15 mg Oral QHS    mycophenolate  1,000 mg Oral BID    nystatin  500,000 Units Mouth/Throat QID (WM & HS)    polyethylene glycol  17 g Oral Daily    [START ON 12/27/2019] predniSONE  10 mg Oral BID    predniSONE  15 mg Oral BID    sulfamethoxazole-trimethoprim 400-80mg  1 tablet Oral Daily    tacrolimus  3 mg Oral Daily AM    tacrolimus  4 mg Oral Daily PM    valGANciclovir  450 mg Oral Daily    venlafaxine  150 mg Oral Daily     PRN Meds:acetaminophen, Dextrose 10% Bolus, Dextrose 10% Bolus, glucagon (human recombinant), glucose, glucose, insulin aspart U-100, oxyCODONE, zolpidem    Review of patient's allergies indicates:   Allergen Reactions    Adhesive Blisters     Reaction to area in chest and up only    Codeine Itching     Objective:      Vital Signs (Most Recent):  Temp: 98.4 °F (36.9 °C) (12/26/19 1050)  Pulse: 85 (12/26/19 1114)  Resp: 19 (12/26/19 1103)  BP: 110/60 (12/26/19 1103)  SpO2: 97 % (12/26/19 1103) Vital Signs (24h Range):  Temp:  [97.6 °F (36.4 °C)-98.4 °F (36.9 °C)] 98.4 °F (36.9 °C)  Pulse:  [84-95] 85  Resp:  [16-20] 19  SpO2:  [96 %-99 %] 97 %  BP: (104-121)/(57-74) 110/60     Patient Vitals for the past 72 hrs (Last 3 readings):   Weight   12/26/19 0700 103.5 kg (228 lb 4.6 oz)   12/25/19 0330 103.9 kg (229 lb 2.7 oz)   12/24/19 0745 104.3 kg (230 lb)     Body mass index is 36.85 kg/m².      Intake/Output Summary (Last 24 hours) at 12/26/2019 1139  Last data filed at 12/26/2019 1000  Gross per 24 hour   Intake 1479.4 ml   Output 1800 ml   Net -320.6 ml       Hemodynamic Parameters:       Telemetry: reviewed: sinus tach: rates in 90's    Physical Exam   Constitutional: She is oriented to person, place, and time. She appears well-developed and well-nourished.   HENT:   Head: Normocephalic.   Neck: Normal range of motion. JVD present.   Cardiovascular: Normal rate, regular rhythm and normal heart sounds.   No murmur heard.  Pulmonary/Chest: Effort normal and breath sounds normal.   Healing post surgical sternotomy wound    Abdominal: Soft. Bowel sounds are normal.   Musculoskeletal: Normal range of motion.   Neurological: She is alert and oriented to person, place, and time.   Skin: Skin is warm and dry. Capillary refill takes less than 2 seconds.   Constitutional: laying in bed NAD  Head: Normocephalic and atraumatic.   Eyes: Conjunctivae are normal.   Neck: Neck supple. Left IJ in place  Cardiovascular: Regular rate and rhythm; S1, S2 normal.  No rub, gallop or murmer  Pulmonary/Chest: Effort normal. Chest tubes intact. Serosanguinous drainage. Pacing wires in place.    Abdominal: Soft. There is no tenderness.   Musculoskeletal: She exhibits no deformity. Right femoral access site covered with dressing. No issues.     Neurological: She is alert.   Skin: Skin is warm and dry.   Psychiatric: Mood and affect appropriate    Nursing note and vitals reviewed.    Significant Labs:  CBC:  Recent Labs   Lab 12/24/19  0655 12/25/19  0617 12/26/19  0629   WBC 8.27 10.79 15.19*   RBC 2.92* 3.00* 3.05*   HGB 8.0* 8.4* 8.3*   HCT 25.7* 26.7* 27.6*    304 337   MCV 88 89 91   MCH 27.4 28.0 27.2   MCHC 31.1* 31.5* 30.1*     BNP:  Recent Labs   Lab 12/24/19  0655   *     CMP:  Recent Labs   Lab 12/24/19  0655  12/25/19  0617 12/25/19  1543 12/26/19  0629   *   < > 151* 240* 157*   CALCIUM 8.2*   < > 8.2* 8.6* 8.5*   ALBUMIN 2.8*  --  3.0*  --  2.8*   PROT 5.0*  --  5.2*  --  5.2*      < > 138 140 139   K 3.4*   < > 3.7 4.4 4.3   CO2 32*   < > 30* 29 29   CL 99   < > 97 99 101   BUN 49*   < > 46* 45* 43*   CREATININE 1.7*   < > 1.8* 1.8* 1.7*   ALKPHOS 66  --  71  --  76   ALT 29  --  31  --  30   AST 21  --  25  --  24   BILITOT 0.6  --  0.6  --  0.6    < > = values in this interval not displayed.      Coagulation:   Recent Labs   Lab 12/21/19  0530 12/22/19  0530 12/23/19  0530   INR 1.3* 1.4* 1.2     LDH:  No results for input(s): LDH in the last 72 hours.  Microbiology:  Microbiology Results (last 7 days)     ** No results found for the last 168 hours. **        have reviewed all pertinent labs within the past 24 hours.    Estimated Creatinine Clearance: 48.1 mL/min (A) (based on SCr of 1.7 mg/dL (H)).    Diagnostic Results:  I have reviewed all pertinent imaging results/findings within the past 24 hours.    Assessment and Plan:     50 yo WF with NICM, s/p HM3 implantation 9/10/19 (post up coarse uncomplicated with the exception of+ diaphragmatic stimulation of LV lead and pleural effusion with chest tube placement, atrial flutter with NADINE/DCCV), V-fib reported in setting of hypokalemia with appropriate shock from ICD on Amiodarone prior to LVAD placement; and recent hospitalizations for ventricular arrythmias;  started on Mexilitine.  She was seen in EP clinic today and she continues to have multiple VT episodes with some ATP therapy, although no ICD shocks since starting mexiletine 10/24/2019. One slow VT episode 2.5hrs 11/3/2019. Patient asymptomatic with LVAD. On coumadin. No AFL since post-op event (paced out of rhythm 9/24/2019). CHB with 100% V pacing (LV lead off). She does not feel well. Dry heaving with mexiletine. Taking phenergan PRN. 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   She was seen in HTS clinic and reported 4 low flow alarms the last 4 nights.  She reports they occur in her sleep and last for only a few seconds.  By the time she wakes up; they quickly stop.  She does report to possibly being little volume overloaded.  She hasn't noticed weight gain at home but thinks she may have little extra fluid.  She denies SOB, chest pain, palpitations, LEGER. In review of her records: she was 223lbs on 10/24/19 during previous hospitalization and is 235lbs now.  Her lasix had previously been decreased to prn.     * Status post heart transplant  -Transplant Date 12/16/2019. HM 3 implanted 9/10/19 removed during transplant.    -HTS Primary.  -Transplanted by: Dr. Nuñez.  -CMV Status:D-/R+ .  -Rejection status: Low Risk.  -Hemodynamic support with:  5mcg/kg/mn and will start to wean. Continues on Lasix IVP to PO 80 mg BID as of 12/26.    -Current immunosuppression:Prednisone 15 mg BID, MMF 1000 BID (CMV high risk); Prograf 3/4. 2 Doses of Thymo given 12/18, 12/19 due to and creat of 3.3. On Bactrim.   -No biopsy this week. Will plan for biopsy next week.   -Prophylaxis- Nystatin.  Valcyte to begin on 12/26   -Chest tubes removed. Pacer wires in place.        GLO (acute kidney injury)  - Baseline creat was 1.3-1.6  - Acute elevation post transplant  - Will avoid nephrotoxins and monitor       CKD (chronic kidney disease)  - Baseline creat appears 1.3-1.6.   - see above  GLO Herzog MD  Heart Transplant  [facility]

## 2019-12-26 NOTE — PROGRESS NOTES
Update:    SW met with pt and caregiver to provide continuity of care, resources and support. Pt was AAOx4, although complaining of pain she believes is related to her surgery. Pt reports that she is coping adequately despite this. Pt was able to complete [place] financial profile and return to . Pt is at $0 fee for LR based on information she provided. SW received notification from Sadia Rascon at the [employer], that pt has an apartment available for today at 2pm, [address]. Caregiver Flavia plans on picking up keys at that time. Pt and caregiver unsure of pt's discharge date, although caregiver hoping pt remains in the hospital through the weekend as pt having this new onset of pain. Pt and caregiver [facility]     Caregiver reports that she was able to take a 30 day leave of absence from work. She states that if she needs to stay out of work longer, she will change her status from FT to PRN. SW expressed understanding.     Pt and caregiver denying any current needs at this time. SW remains available for continued psychosocial support, education, resources, and additional d/c planning as needed.

## 2019-12-26 NOTE — NURSING
Rounds Report: Attended interdisciplinary rounds this afternoon   with the transplant team including SW, physicians, fellows,  mid-level providers, and transplant coordinators.Discussed plan of care, including DC date of Dec 30.     Heart Transplant Coordinators Education  Met with patient in hospital room. Pt was alert and oriented  1. Reviewed out-patient follow-up - (non fasting) echo guided biopsy, fasting lab, clinic visit. Told her to bring her meds to take after lab and to bring a snack and bottle of water in case she does not have time to go to breakfast.   2. Wound care - Sternum approximated with sutures and steri strips; groin wound per patient is healing with steri strips; AICD wound healing; drive line wound healing (not closed yet) and chest tube sites which are draining serosanguinous fluid. Reviewed signs of infection - redness, pain, drainage that smells foul and is purulent, temperature. Told to inform team  3. Blood sugars - Endocrine had followed and feels that she will not need any supplemental medication. She was not a diabetic prior to surgery.   4. Diet - Encouraged hydration. Talked about the forbidden foods such as raw eggs, raw meat, raw fish. Discussed the cleaning of vegetables and fruit.  5. Exercise - She has been walking with PT/ OT and has received exercises to perform also. She is aware of sternal precautions. Told her we recommend Cardiac Rehab at 6-8 weeks   6. Vital Signs - Discussed parameters of blood pressures and pulse rates. She knows how to use a cuff for BP  7. Medications - Reviewed her entire list of meds and what they do and if they will be stopped in the future.

## 2019-12-26 NOTE — PLAN OF CARE
- Patient is POD #10 following heart transplantation for idiopathic dilated cardiomyopathy. Original admit date was 11/13/19 (for LVAD complications, dysrhythmias).  - Patient doing well overnight. Pt denies pain; pt denies nausea.  - Mid-sternal incision is FARRAH with steri-strips and sutures. Other skin sites (former chest tube sites, AICD explant site, RLQ former driveline site, and former right groin site) covered/healing.  - Dobutamine infusion continues at 5 mcg/kg/min; infusing to midline catheter. Furosemide infusion was discontinued yesterday; patient is now on Lasix IVP.  - Patient pulled all 2100 self medications without errors and reviewed blue card with her caregiver at bedside.  - BG monitoring is AC/HS for steroid-induced hyperglycemia. BG= 223 at bedtime. 1 Unit of Novolog given.  - Patient is independent and ambulatory; pt is aware of sternal precautions; pt changes positions slowly.  - Patient is meeting all post-op goals. Discharge planned for Friday after patient and caregiver receive additional teaching today.

## 2019-12-26 NOTE — PROGRESS NOTES
Date Consent signed: 12/26/19    Sponsor: [employer]    Study Title/IRB Number: [facility] Registry/ [id number]    Principle Investigator: Renetta Chaudhari MD    Present for Discussion: myself, patient, patient's friend     Is LAR Consenting for Subject: No    Prior to the Informed Consent (IC) being signed, or any study protocol required data collection, testing, procedure, or intervention being performed, the following was done and/or discussed:   Patient was given a copy of the IC for review    Purpose of the study and qualifications to participate    Study design, Follow up schedule, and tests or procedures done at each visit   Confidentiality and HIPAA Authorization for Release of Medical Records for the research trial/ subject's rights/research related injury   Risk, Benefits, Alternative Treatments, Compensation and Costs   Participation in the research trial is voluntary and patient may withdraw at anytime   Contact information for study related questions    Patient verbalizes understanding of the above: Yes  Contact information for CRC and PI given to patient: Yes  Patient able to adequately summarize: the purpose of the study, the risks associated with the study, and all procedures, testing, and follow-ups associated with the study: Yes    Patient signed the informed consent form for the [facility] research study with an IRB approval date of 6/18/19.  Each page of the consent form was reviewed with patient (and pts family) and all questions answered satisfactorily. Patient signed the consent form and received a copy of same. The original consent was scanned into electronic medical records (EPIC) and filed into the subject's research study binder.

## 2019-12-26 NOTE — PT/OT/SLP PROGRESS
Physical Therapy Treatment    Patient Name:  Deborah Navas   MRN:  [id number]  Admitting Diagnosis: Status post heart transplant  Recent Surgery: Procedure(s) (LRB):  TRANSPLANT, HEART (N/A) 10 Days Post-Op    Recommendations:     Discharge Recommendations:  home health PT   Discharge Equipment Recommendations: none   Barriers to discharge: Inaccessible home    Plan:     During this hospitalization, patient to be seen 4 x/week to address the above listed problems via gait training, therapeutic activities, therapeutic exercises, neuromuscular re-education  · Plan of Care Expires:  01/16/20   Plan of Care Reviewed with: patient    This Plan of care has been discussed with the patient who was involved in its development and understands and is in agreement with the identified goals and treatment plan    Subjective     Communicated with RN (Laura WASSERMAN) prior to session.     Patient comments: Pt with c/o dizziness upon standing  Pain/Comfort:  · Pain Rating 1: 7/10  · Location - Side 1: Bilateral  · Location - Orientation 1: generalized  · Location 1: (chest and abdomen)  · Pain Addressed 1: Pre-medicate for activity, Distraction, Cessation of Activity  · Pain Rating Post-Intervention 1: (no rating provided)    Objective:     Patient found with: peripheral IV, telemetry    Patient found seated at the EOB upon PT entry to room, agreeable to treatment.  No family present in the room.    General Precautions: Standard, fall, sternal(mask when outside of room)   Orthopedic Precautions:N/A   Braces: N/A       BED MOBILITY       NP 2* pt found/left seated at the EOB     TRANSFERS  (vc's for hand placement, sequencing of task and safety)   Patient completed Sit <> Stand Transfer from EOB with SBA for safety with no assistive device x2-3 trial(s) with increased time   Patient completed Stand <> Sit Transfer to EOB with SBA for safety with no assistive device      GAIT: NP 2* pt reports having recently gone for a walk in  "hallway with OT    Pt was educated on stretches for shoulders while observing sternal precautions    STANDING       Pt tolerates static standing with no AD requiring SBA for safety x1 min x2-3 trials with vc's for appropriate breathing and safety.      STAIRS  NP 2* pt refused stating "my legs are not strong enough yet"    EDUCATION  Patient provided with daily orientation and goals of this PT session. PTA reviews sternal precautions with pt being able to recall 3/3. They were educated to call for assistance and to transfer with hospital staff only.  Also, pt was educated on the effects of prolonged immobility and the importance of performing OOB activity and exercises to promote healing and reduce recovery time.        Whiteboard updated with correct mobility information. RN/PCT notified.  Pt safe to transfer OOB/BTB and amb with RN/PCT: Use no AD with CGA of 1 person.    Patient left seated at the EOB, with  all lines intact, call button in reach, RN notified and tx coordinator present    AM-PAC 6 CLICK MOBILITY  Turning over in bed (including adjusting bedclothes, sheets and blankets)?: 3  Sitting down on and standing up from a chair with arms (e.g., wheelchair, bedside commode, etc.): 3  Moving from lying on back to sitting on the side of the bed?: 3  Moving to and from a bed to a chair (including a wheelchair)?: 3  Need to walk in hospital room?: 3  Climbing 3-5 steps with a railing?: 2  Basic Mobility Total Score: 17     Assessment:     Deborah Navas is a 49 y.o. female admitted with a medical diagnosis of Status post heart transplant.  She presents with the following impairments/functional limitations:  weakness, impaired endurance, impaired self care skills, impaired functional mobilty, gait instability, impaired balance, decreased upper extremity function, decreased lower extremity function, pain, impaired skin, edema, impaired cardiopulmonary response to activity. requiring assistance and verbal " cues for transfers and gait to prevent falls due to weakness, fatigue and instability.  Pt remains motivated to participate in PT session and will cont to benefit from skilled PT intervention.      Rehab Prognosis:  Good; patient would benefit from acute skilled PT services to address these deficits and reach maximum level of function.      GOALS:   Multidisciplinary Problems     Physical Therapy Goals        Problem: Physical Therapy Goal    Goal Priority Disciplines Outcome Goal Variances Interventions   Physical Therapy Goal     PT, PT/OT Ongoing, Progressing     Description:  Goals to be met by: 2020     Patient will increase functional independence with mobility by performin.Supine to Sit with CGA - not met  2. Sit to stand with Supervision - not met  3. Gait x 150ft with Supervision - not met  4. Ascend/descend 4 stairs with CGA - not met  5. Standing for 3 minutes with CGA to increase activity tolerance - not met                        Time Tracking:     PT Received On: 19  PT Start Time: 1349     PT Stop Time: 1412  PT Total Time (min): 23 min     Billable Minutes: Therapeutic Activity 23    Treatment Type: Treatment  PT/PTA: PTA     PTA Visit Number: 1       Nasrin Cooper PTA.  Pager [phone]    2019    .

## 2019-12-26 NOTE — SUBJECTIVE & OBJECTIVE
Interval History: No issues overnight but feeling anterior and posterior chest discomfort, described as muscle ache. Working with PT/OT. Scr better at 1.7. Tacrolimus is 9.3.     Today's plan:   -Lasix IVP to PO 80 mg BID  - wean to 2.5 then off by tonight if clinically tolerating  -Tacro levels up to 9.3 and will continue current dosing (3/4)  -CXR to evaluate chest complaint as WBC (denies fever, cough, pleuresis)    Lines:   Midline: Right cephalic vein: 12/4/19  Pacer Wires: 12/16/19    Continuous Infusions:   DOBUTamine 2.5 mcg/kg/min (12/26/19 0944)     Scheduled Meds:   aspirin  81 mg Oral Daily    atorvastatin  20 mg Oral Daily    ergocalciferol  50,000 Units Oral Daily    famotidine  20 mg Oral Daily    ferrous gluconate  324 mg Oral TID WM    furosemide  80 mg Oral BID    gabapentin  100 mg Oral TID    mirtazapine  15 mg Oral QHS    mycophenolate  1,000 mg Oral BID    nystatin  500,000 Units Mouth/Throat QID (WM & HS)    polyethylene glycol  17 g Oral Daily    [START ON 12/27/2019] predniSONE  10 mg Oral BID    predniSONE  15 mg Oral BID    sulfamethoxazole-trimethoprim 400-80mg  1 tablet Oral Daily    tacrolimus  3 mg Oral Daily AM    tacrolimus  4 mg Oral Daily PM    valGANciclovir  450 mg Oral Daily    venlafaxine  150 mg Oral Daily     PRN Meds:acetaminophen, Dextrose 10% Bolus, Dextrose 10% Bolus, glucagon (human recombinant), glucose, glucose, insulin aspart U-100, oxyCODONE, zolpidem    Review of patient's allergies indicates:   Allergen Reactions    Adhesive Blisters     Reaction to area in chest and up only    Codeine Itching     Objective:     Vital Signs (Most Recent):  Temp: 98.4 °F (36.9 °C) (12/26/19 1050)  Pulse: 85 (12/26/19 1114)  Resp: 19 (12/26/19 1103)  BP: 110/60 (12/26/19 1103)  SpO2: 97 % (12/26/19 1103) Vital Signs (24h Range):  Temp:  [97.6 °F (36.4 °C)-98.4 °F (36.9 °C)] 98.4 °F (36.9 °C)  Pulse:  [84-95] 85  Resp:  [16-20] 19  SpO2:  [96 %-99 %] 97 %  BP:  (104-121)/(57-74) 110/60     Patient Vitals for the past 72 hrs (Last 3 readings):   Weight   12/26/19 0700 103.5 kg (228 lb 4.6 oz)   12/25/19 0330 103.9 kg (229 lb 2.7 oz)   12/24/19 0745 104.3 kg (230 lb)     Body mass index is 36.85 kg/m².      Intake/Output Summary (Last 24 hours) at 12/26/2019 1139  Last data filed at 12/26/2019 1000  Gross per 24 hour   Intake 1479.4 ml   Output 1800 ml   Net -320.6 ml       Hemodynamic Parameters:       Telemetry: reviewed: sinus tach: rates in 90's    Physical Exam   Constitutional: She is oriented to person, place, and time. She appears well-developed and well-nourished.   HENT:   Head: Normocephalic.   Neck: Normal range of motion. JVD present.   Cardiovascular: Normal rate, regular rhythm and normal heart sounds.   No murmur heard.  Pulmonary/Chest: Effort normal and breath sounds normal.   Healing post surgical sternotomy wound    Abdominal: Soft. Bowel sounds are normal.   Musculoskeletal: Normal range of motion.   Neurological: She is alert and oriented to person, place, and time.   Skin: Skin is warm and dry. Capillary refill takes less than 2 seconds.   Constitutional: laying in bed NAD  Head: Normocephalic and atraumatic.   Eyes: Conjunctivae are normal.   Neck: Neck supple. Left IJ in place  Cardiovascular: Regular rate and rhythm; S1, S2 normal.  No rub, gallop or murmer  Pulmonary/Chest: Effort normal. Chest tubes intact. Serosanguinous drainage. Pacing wires in place.    Abdominal: Soft. There is no tenderness.   Musculoskeletal: She exhibits no deformity. Right femoral access site covered with dressing. No issues.    Neurological: She is alert.   Skin: Skin is warm and dry.   Psychiatric: Mood and affect appropriate    Nursing note and vitals reviewed.    Significant Labs:  CBC:  Recent Labs   Lab 12/24/19  0655 12/25/19  0617 12/26/19  0629   WBC 8.27 10.79 15.19*   RBC 2.92* 3.00* 3.05*   HGB 8.0* 8.4* 8.3*   HCT 25.7* 26.7* 27.6*    304 337   MCV 88  89 91   MCH 27.4 28.0 27.2   MCHC 31.1* 31.5* 30.1*     BNP:  Recent Labs   Lab 12/24/19  0655   *     CMP:  Recent Labs   Lab 12/24/19  0655  12/25/19  0617 12/25/19  1543 12/26/19  0629   *   < > 151* 240* 157*   CALCIUM 8.2*   < > 8.2* 8.6* 8.5*   ALBUMIN 2.8*  --  3.0*  --  2.8*   PROT 5.0*  --  5.2*  --  5.2*      < > 138 140 139   K 3.4*   < > 3.7 4.4 4.3   CO2 32*   < > 30* 29 29   CL 99   < > 97 99 101   BUN 49*   < > 46* 45* 43*   CREATININE 1.7*   < > 1.8* 1.8* 1.7*   ALKPHOS 66  --  71  --  76   ALT 29  --  31  --  30   AST 21  --  25  --  24   BILITOT 0.6  --  0.6  --  0.6    < > = values in this interval not displayed.      Coagulation:   Recent Labs   Lab 12/21/19  0530 12/22/19  0530 12/23/19  0530   INR 1.3* 1.4* 1.2     LDH:  No results for input(s): LDH in the last 72 hours.  Microbiology:  Microbiology Results (last 7 days)     ** No results found for the last 168 hours. **        have reviewed all pertinent labs within the past 24 hours.    Estimated Creatinine Clearance: 48.1 mL/min (A) (based on SCr of 1.7 mg/dL (H)).    Diagnostic Results:  I have reviewed all pertinent imaging results/findings within the past 24 hours.

## 2019-12-26 NOTE — PLAN OF CARE
Problem: Physical Therapy Goal  Goal: Physical Therapy Goal  Description  Goals to be met by: 2020     Patient will increase functional independence with mobility by performin.Supine to Sit with CGA - not met  2. Sit to stand with Supervision - not met  3. Gait x 150ft with Supervision - not met  4. Ascend/descend 4 stairs with CGA - not met  5. Standing for 3 minutes with CGA to increase activity tolerance - not met       Outcome: Ongoing, Progressing      Discharge Recommendations: HH PT    Pt safe to transfer OOB/BTB and amb with RN/PCT: Use no AD with CGA of 1 person.      Goals remain appropriate.     Nasrin Cooper, PTA.   [phone]   2019

## 2019-12-27 LAB
ALBUMIN SERPL BCP-MCNC: 2.9 G/DL (ref 3.5–5.2)
ALP SERPL-CCNC: 79 U/L (ref 55–135)
ALT SERPL W/O P-5'-P-CCNC: 25 U/L (ref 10–44)
ANION GAP SERPL CALC-SCNC: 10 MMOL/L (ref 8–16)
ANION GAP SERPL CALC-SCNC: 8 MMOL/L (ref 8–16)
AST SERPL-CCNC: 15 U/L (ref 10–40)
BASOPHILS # BLD AUTO: 0.03 K/UL (ref 0–0.2)
BASOPHILS NFR BLD: 0.2 % (ref 0–1.9)
BILIRUB SERPL-MCNC: 0.8 MG/DL (ref 0.1–1)
BUN SERPL-MCNC: 40 MG/DL (ref 6–20)
BUN SERPL-MCNC: 41 MG/DL (ref 6–20)
CALCIUM SERPL-MCNC: 8.6 MG/DL (ref 8.7–10.5)
CALCIUM SERPL-MCNC: 8.6 MG/DL (ref 8.7–10.5)
CHLORIDE SERPL-SCNC: 100 MMOL/L (ref 95–110)
CHLORIDE SERPL-SCNC: 98 MMOL/L (ref 95–110)
CO2 SERPL-SCNC: 28 MMOL/L (ref 23–29)
CO2 SERPL-SCNC: 30 MMOL/L (ref 23–29)
CREAT SERPL-MCNC: 1.6 MG/DL (ref 0.5–1.4)
CREAT SERPL-MCNC: 1.6 MG/DL (ref 0.5–1.4)
DIFFERENTIAL METHOD: ABNORMAL
EOSINOPHIL # BLD AUTO: 0 K/UL (ref 0–0.5)
EOSINOPHIL NFR BLD: 0 % (ref 0–8)
ERYTHROCYTE [DISTWIDTH] IN BLOOD BY AUTOMATED COUNT: 17 % (ref 11.5–14.5)
EST. GFR  (AFRICAN AMERICAN): 43 ML/MIN/1.73 M^2
EST. GFR  (AFRICAN AMERICAN): 43 ML/MIN/1.73 M^2
EST. GFR  (NON AFRICAN AMERICAN): 37.3 ML/MIN/1.73 M^2
EST. GFR  (NON AFRICAN AMERICAN): 37.3 ML/MIN/1.73 M^2
GLUCOSE SERPL-MCNC: 144 MG/DL (ref 70–110)
GLUCOSE SERPL-MCNC: 163 MG/DL (ref 70–110)
HCT VFR BLD AUTO: 28.1 % (ref 37–48.5)
HGB BLD-MCNC: 8.6 G/DL (ref 12–16)
IMM GRANULOCYTES # BLD AUTO: 0.43 K/UL (ref 0–0.04)
IMM GRANULOCYTES NFR BLD AUTO: 2.3 % (ref 0–0.5)
LYMPHOCYTES # BLD AUTO: 0.1 K/UL (ref 1–4.8)
LYMPHOCYTES NFR BLD: 0.4 % (ref 18–48)
MAGNESIUM SERPL-MCNC: 1.5 MG/DL (ref 1.6–2.6)
MCH RBC QN AUTO: 27.7 PG (ref 27–31)
MCHC RBC AUTO-ENTMCNC: 30.6 G/DL (ref 32–36)
MCV RBC AUTO: 90 FL (ref 82–98)
MONOCYTES # BLD AUTO: 0.6 K/UL (ref 0.3–1)
MONOCYTES NFR BLD: 3.2 % (ref 4–15)
NEUTROPHILS # BLD AUTO: 17.6 K/UL (ref 1.8–7.7)
NEUTROPHILS NFR BLD: 93.9 % (ref 38–73)
NRBC BLD-RTO: 0 /100 WBC
PLATELET # BLD AUTO: 336 K/UL (ref 150–350)
PMV BLD AUTO: 9.5 FL (ref 9.2–12.9)
POCT GLUCOSE: 140 MG/DL (ref 70–110)
POCT GLUCOSE: 146 MG/DL (ref 70–110)
POCT GLUCOSE: 165 MG/DL (ref 70–110)
POCT GLUCOSE: 203 MG/DL (ref 70–110)
POTASSIUM SERPL-SCNC: 4.4 MMOL/L (ref 3.5–5.1)
POTASSIUM SERPL-SCNC: 4.7 MMOL/L (ref 3.5–5.1)
PROT SERPL-MCNC: 5.5 G/DL (ref 6–8.4)
RBC # BLD AUTO: 3.11 M/UL (ref 4–5.4)
SODIUM SERPL-SCNC: 136 MMOL/L (ref 136–145)
SODIUM SERPL-SCNC: 138 MMOL/L (ref 136–145)
TACROLIMUS BLD-MCNC: 9.8 NG/ML (ref 5–15)
WBC # BLD AUTO: 18.77 K/UL (ref 3.9–12.7)

## 2019-12-27 PROCEDURE — 80053 COMPREHEN METABOLIC PANEL: CPT

## 2019-12-27 PROCEDURE — 25000003 PHARM REV CODE 250: Performed by: PHYSICIAN ASSISTANT

## 2019-12-27 PROCEDURE — 83735 ASSAY OF MAGNESIUM: CPT

## 2019-12-27 PROCEDURE — 80197 ASSAY OF TACROLIMUS: CPT

## 2019-12-27 PROCEDURE — 99232 PR SUBSEQUENT HOSPITAL CARE,LEVL II: ICD-10-PCS | Mod: ,,, | Performed by: INTERNAL MEDICINE

## 2019-12-27 PROCEDURE — 63600175 PHARM REV CODE 636 W HCPCS: Performed by: INTERNAL MEDICINE

## 2019-12-27 PROCEDURE — 36415 COLL VENOUS BLD VENIPUNCTURE: CPT

## 2019-12-27 PROCEDURE — 80048 BASIC METABOLIC PNL TOTAL CA: CPT

## 2019-12-27 PROCEDURE — 99232 SBSQ HOSP IP/OBS MODERATE 35: CPT | Mod: ,,, | Performed by: INTERNAL MEDICINE

## 2019-12-27 PROCEDURE — 25000003 PHARM REV CODE 250: Performed by: INTERNAL MEDICINE

## 2019-12-27 PROCEDURE — 25000003 PHARM REV CODE 250: Performed by: NURSE PRACTITIONER

## 2019-12-27 PROCEDURE — 25000003 PHARM REV CODE 250: Performed by: STUDENT IN AN ORGANIZED HEALTH CARE EDUCATION/TRAINING PROGRAM

## 2019-12-27 PROCEDURE — 63600175 PHARM REV CODE 636 W HCPCS: Performed by: PHYSICIAN ASSISTANT

## 2019-12-27 PROCEDURE — 85025 COMPLETE CBC W/AUTO DIFF WBC: CPT

## 2019-12-27 PROCEDURE — 63600175 PHARM REV CODE 636 W HCPCS: Performed by: NURSE PRACTITIONER

## 2019-12-27 PROCEDURE — 20600001 HC STEP DOWN PRIVATE ROOM

## 2019-12-27 RX ORDER — OXYCODONE HYDROCHLORIDE 10 MG/1
10 TABLET ORAL EVERY 4 HOURS PRN
Status: DISCONTINUED | OUTPATIENT
Start: 2019-12-27 | End: 2019-12-30 | Stop reason: HOSPADM

## 2019-12-27 RX ORDER — OXYCODONE HYDROCHLORIDE 5 MG/1
5 TABLET ORAL EVERY 4 HOURS PRN
Status: DISCONTINUED | OUTPATIENT
Start: 2019-12-27 | End: 2019-12-30 | Stop reason: HOSPADM

## 2019-12-27 RX ORDER — LANOLIN ALCOHOL/MO/W.PET/CERES
400 CREAM (GRAM) TOPICAL 2 TIMES DAILY
Status: DISCONTINUED | OUTPATIENT
Start: 2019-12-27 | End: 2019-12-30 | Stop reason: HOSPADM

## 2019-12-27 RX ADMIN — Medication 400 MG: at 01:12

## 2019-12-27 RX ADMIN — OXYCODONE HYDROCHLORIDE 10 MG: 10 TABLET ORAL at 12:12

## 2019-12-27 RX ADMIN — NYSTATIN 500000 UNITS: 100000 SUSPENSION ORAL at 06:12

## 2019-12-27 RX ADMIN — SULFAMETHOXAZOLE AND TRIMETHOPRIM 1 TABLET: 400; 80 TABLET ORAL at 08:12

## 2019-12-27 RX ADMIN — FUROSEMIDE 80 MG: 40 TABLET ORAL at 06:12

## 2019-12-27 RX ADMIN — FERROUS GLUCONATE TAB 324 MG (37.5 MG ELEMENTAL IRON) 324 MG: 324 (37.5 FE) TAB at 08:12

## 2019-12-27 RX ADMIN — ERGOCALCIFEROL 50000 UNITS: 1.25 CAPSULE ORAL at 08:12

## 2019-12-27 RX ADMIN — THEOPHYLLINE ANHYDROUS 200 MG: 100 CAPSULE, EXTENDED RELEASE ORAL at 12:12

## 2019-12-27 RX ADMIN — MIRTAZAPINE 15 MG: 15 TABLET, FILM COATED ORAL at 08:12

## 2019-12-27 RX ADMIN — OXYCODONE HYDROCHLORIDE 10 MG: 10 TABLET ORAL at 02:12

## 2019-12-27 RX ADMIN — OXYCODONE HYDROCHLORIDE 10 MG: 10 TABLET ORAL at 08:12

## 2019-12-27 RX ADMIN — NYSTATIN 500000 UNITS: 100000 SUSPENSION ORAL at 08:12

## 2019-12-27 RX ADMIN — DOBUTAMINE IN DEXTROSE 2.5 MCG/KG/MIN: 200 INJECTION, SOLUTION INTRAVENOUS at 06:12

## 2019-12-27 RX ADMIN — Medication 400 MG: at 08:12

## 2019-12-27 RX ADMIN — ATORVASTATIN CALCIUM 20 MG: 20 TABLET, FILM COATED ORAL at 08:12

## 2019-12-27 RX ADMIN — TACROLIMUS 3 MG: 1 CAPSULE ORAL at 08:12

## 2019-12-27 RX ADMIN — PREDNISONE 10 MG: 10 TABLET ORAL at 08:12

## 2019-12-27 RX ADMIN — FAMOTIDINE 20 MG: 20 TABLET ORAL at 08:12

## 2019-12-27 RX ADMIN — NYSTATIN 500000 UNITS: 100000 SUSPENSION ORAL at 12:12

## 2019-12-27 RX ADMIN — POLYETHYLENE GLYCOL 3350 17 G: 17 POWDER, FOR SOLUTION ORAL at 08:12

## 2019-12-27 RX ADMIN — TACROLIMUS 4 MG: 1 CAPSULE ORAL at 06:12

## 2019-12-27 RX ADMIN — GABAPENTIN 100 MG: 100 CAPSULE ORAL at 08:12

## 2019-12-27 RX ADMIN — VALGANCICLOVIR 450 MG: 450 TABLET, FILM COATED ORAL at 08:12

## 2019-12-27 RX ADMIN — INSULIN ASPART 1 UNITS: 100 INJECTION, SOLUTION INTRAVENOUS; SUBCUTANEOUS at 10:12

## 2019-12-27 RX ADMIN — ASPIRIN 81 MG: 81 TABLET, COATED ORAL at 08:12

## 2019-12-27 RX ADMIN — PREDNISONE 15 MG: 10 TABLET ORAL at 08:12

## 2019-12-27 RX ADMIN — MYCOPHENOLATE MOFETIL 1000 MG: 250 CAPSULE ORAL at 08:12

## 2019-12-27 RX ADMIN — GABAPENTIN 100 MG: 100 CAPSULE ORAL at 12:12

## 2019-12-27 RX ADMIN — FUROSEMIDE 80 MG: 40 TABLET ORAL at 08:12

## 2019-12-27 RX ADMIN — FERROUS GLUCONATE TAB 324 MG (37.5 MG ELEMENTAL IRON) 324 MG: 324 (37.5 FE) TAB at 06:12

## 2019-12-27 RX ADMIN — VENLAFAXINE HYDROCHLORIDE 150 MG: 37.5 CAPSULE, EXTENDED RELEASE ORAL at 08:12

## 2019-12-27 RX ADMIN — ZOLPIDEM TARTRATE 5 MG: 5 TABLET ORAL at 10:12

## 2019-12-27 RX ADMIN — FERROUS GLUCONATE TAB 324 MG (37.5 MG ELEMENTAL IRON) 324 MG: 324 (37.5 FE) TAB at 12:12

## 2019-12-27 NOTE — PLAN OF CARE
Pt aaox4.  Bed in low and locked position.  Nonskid socks in use.  Call bell, phone, food, drink within reach in bed or on bedside table.  Aware to call if needing assistance.  Pain controlled with 5/10mg po oxy Q4hrs.  Pacer wires grounded.  MST with dermabond and sutures.  Old chest tube sites cdi.  RLQ driveline site cdi.  Left Upper old aicd with dermabond and ss.  R PIV and R ML cdi.  Tele in use.  HR noted to be continuing to drop as low as 73 since dobutamine dc this am.  Theophylline started - to inform oncall if drops below 70s.  accuchecks ac/hs.  Up and ambulating independently around room and halls. Pulling self meds 100%.  See flowsheet for full assessment and details

## 2019-12-27 NOTE — PLAN OF CARE
Pt aaox4.  Bed in low and locked position.  Nonskid socks in use.  Call bell, phone, food, drink within reach in bed or on bedside table.  Aware to call if needing assistance.  Pain ^ today and not controlled with 5mg po oxy Q4hrs - tylenol po prn added on.  Pacer wires grounded.  MST with dermabond and sutures.  Old chest tube sites cdi.  RLQ driveline site cdi.  Left Upper old aicd with dermabond and ss.  L PIV and R ML cdi.  Tele in use. Dobutamine decreased to 2.5mcg/kg/min, dw 100.8kg, rate 7.6cc/hr.    accuchecks ac/hs.  Up and ambulating independently around room and halls. Pulling self meds 100%.  See flowsheet for full assessment and details

## 2019-12-27 NOTE — PROGRESS NOTES
[facility] [address]  Heart Transplant  Progress Note    Patient Name: Deborah Navas  MRN: [id number]  Admission Date: 11/13/2019  Hospital Length of Stay: 42 days  Attending Physician: Eric Antunez Jr.,*  Primary Care Provider: Primary Doctor No  Principal Problem:Status post heart transplant    Subjective:     Interval History: No issues overnight.  HR decreased to 80s and asymptomatic. Discussed with pharmacy and will start Theophylline 200 mg QD. Continues to feel chest wall discomfort. CXR reviewed and shows no acute abnormality. Working with PT/OT. Scr better at 1.6. Tacrolimus is 9.8. WBC is 18 and there are no acute signs of infection. Possibly WBC from steroids.     -Continue Lasix PO 80 mg BID  - wean off today  -Tacro levels up to 9.8 and will continue current dosing (3/4)  -Start Theophylline 200 mg QD today    Lines:   Midline: Right cephalic vein: 12/4/19  Pacer Wires: 12/16/19    Continuous Infusions:    Scheduled Meds:   aspirin  81 mg Oral Daily    atorvastatin  20 mg Oral Daily    ergocalciferol  50,000 Units Oral Daily    famotidine  20 mg Oral Daily    ferrous gluconate  324 mg Oral TID WM    furosemide  80 mg Oral BID    gabapentin  100 mg Oral TID    magnesium oxide  400 mg Oral BID    mirtazapine  15 mg Oral QHS    mycophenolate  1,000 mg Oral BID    nystatin  500,000 Units Mouth/Throat QID (WM & HS)    polyethylene glycol  17 g Oral Daily    predniSONE  10 mg Oral BID    sulfamethoxazole-trimethoprim 400-80mg  1 tablet Oral Daily    tacrolimus  3 mg Oral Daily AM    tacrolimus  4 mg Oral Daily PM    theophylline  200 mg Oral Daily    valGANciclovir  450 mg Oral Daily    venlafaxine  150 mg Oral Daily     PRN Meds:acetaminophen, Dextrose 10% Bolus, Dextrose 10% Bolus, glucagon (human recombinant), glucose, glucose, insulin aspart U-100, oxyCODONE, oxyCODONE, zolpidem    Review of patient's allergies indicates:   Allergen Reactions    Adhesive  Blisters     Reaction to area in chest and up only    Codeine Itching     Objective:     Vital Signs (Most Recent):  Temp: 97.7 °F (36.5 °C) (12/27/19 1121)  Pulse: 76 (12/27/19 1125)  Resp: 18 (12/27/19 1125)  BP: 109/61 (12/27/19 1125)  SpO2: 95 % (12/27/19 1125) Vital Signs (24h Range):  Temp:  [97.7 °F (36.5 °C)-98.1 °F (36.7 °C)] 97.7 °F (36.5 °C)  Pulse:  [76-84] 76  Resp:  [13-20] 18  SpO2:  [93 %-98 %] 95 %  BP: (106-129)/(58-74) 109/61     Patient Vitals for the past 72 hrs (Last 3 readings):   Weight   12/27/19 0645 105.1 kg (231 lb 11.3 oz)   12/26/19 0700 103.5 kg (228 lb 4.6 oz)   12/25/19 0330 103.9 kg (229 lb 2.7 oz)     Body mass index is 37.4 kg/m².      Intake/Output Summary (Last 24 hours) at 12/27/2019 1434  Last data filed at 12/27/2019 1200  Gross per 24 hour   Intake 776.81 ml   Output 1900 ml   Net -1123.19 ml       Hemodynamic Parameters:       Telemetry: reviewed: sinus tach: rates in 90's    Physical Exam   Constitutional: She is oriented to person, place, and time. She appears well-developed and well-nourished.   HENT:   Head: Normocephalic.   Neck: Normal range of motion. JVD present.   Cardiovascular: Normal rate, regular rhythm and normal heart sounds.   No murmur heard.  Pulmonary/Chest: Effort normal and breath sounds normal.   Healing post surgical sternotomy wound    Abdominal: Soft. Bowel sounds are normal.   Musculoskeletal: Normal range of motion.   Neurological: She is alert and oriented to person, place, and time.   Skin: Skin is warm and dry. Capillary refill takes less than 2 seconds.   Constitutional: laying in bed NAD  Head: Normocephalic and atraumatic.   Eyes: Conjunctivae are normal.   Neck: Neck supple. Left IJ in place  Cardiovascular: Regular rate and rhythm; S1, S2 normal.  No rub, gallop or murmer  Pulmonary/Chest: Effort normal. Chest tubes intact. Serosanguinous drainage. Pacing wires in place.    Abdominal: Soft. There is no tenderness.   Musculoskeletal: She  exhibits no deformity. Right femoral access site covered with dressing. No issues.    Neurological: She is alert.   Skin: Skin is warm and dry.   Psychiatric: Mood and affect appropriate    Nursing note and vitals reviewed.    Significant Labs:  CBC:  Recent Labs   Lab 12/25/19  0617 12/26/19  0629 12/27/19  0713   WBC 10.79 15.19* 18.77*   RBC 3.00* 3.05* 3.11*   HGB 8.4* 8.3* 8.6*   HCT 26.7* 27.6* 28.1*    337 336   MCV 89 91 90   MCH 28.0 27.2 27.7   MCHC 31.5* 30.1* 30.6*     BNP:  Recent Labs   Lab 12/24/19  0655   *     CMP:  Recent Labs   Lab 12/25/19  0617  12/26/19  0629 12/26/19  1452 12/27/19  0713   *   < > 157* 155* 144*   CALCIUM 8.2*   < > 8.5* 9.0 8.6*   ALBUMIN 3.0*  --  2.8*  --  2.9*   PROT 5.2*  --  5.2*  --  5.5*      < > 139 139 136   K 3.7   < > 4.3 4.6 4.4   CO2 30*   < > 29 30* 30*   CL 97   < > 101 99 98   BUN 46*   < > 43* 44* 40*   CREATININE 1.8*   < > 1.7* 1.8* 1.6*   ALKPHOS 71  --  76  --  79   ALT 31  --  30  --  25   AST 25  --  24  --  15   BILITOT 0.6  --  0.6  --  0.8    < > = values in this interval not displayed.      Coagulation:   Recent Labs   Lab 12/21/19  0530 12/22/19  0530 12/23/19  0530   INR 1.3* 1.4* 1.2     LDH:  No results for input(s): LDH in the last 72 hours.  Microbiology:  Microbiology Results (last 7 days)     ** No results found for the last 168 hours. **        have reviewed all pertinent labs within the past 24 hours.    Estimated Creatinine Clearance: 51.5 mL/min (A) (based on SCr of 1.6 mg/dL (H)).    Diagnostic Results:  I have reviewed all pertinent imaging results/findings within the past 24 hours.    Assessment and Plan:     50 yo WF with NICM, s/p HM3 implantation 9/10/19 (post up coarse uncomplicated with the exception of+ diaphragmatic stimulation of LV lead and pleural effusion with chest tube placement, atrial flutter with NADINE/DCCV), V-fib reported in setting of hypokalemia with appropriate shock from ICD on Amiodarone  prior to LVAD placement; and recent hospitalizations for ventricular arrythmias; started on Mexilitine.  She was seen in EP clinic today and she continues to have multiple VT episodes with some ATP therapy, although no ICD shocks since starting mexiletine 10/24/2019. One slow VT episode 2.5hrs 11/3/2019. Patient asymptomatic with LVAD. On coumadin. No AFL since post-op event (paced out of rhythm 9/24/2019). CHB with 100% V pacing (LV lead off). She does not feel well. Dry heaving with mexiletine. Taking phenergan PRN. 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   She was seen in HTS clinic and reported 4 low flow alarms the last 4 nights.  She reports they occur in her sleep and last for only a few seconds.  By the time she wakes up; they quickly stop.  She does report to possibly being little volume overloaded.  She hasn't noticed weight gain at home but thinks she may have little extra fluid.  She denies SOB, chest pain, palpitations, LEGER. In review of her records: she was 223lbs on 10/24/19 during previous hospitalization and is 235lbs now.  Her lasix had previously been decreased to prn.     * Status post heart transplant  -Transplant Date 12/16/2019. HM 3 implanted 9/10/19 removed during transplant.    -HTS Primary.  -Transplanted by: Dr. Nuñez.  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  -Hemodynamic support with:  5mcg/kg/mn and will start to wean. Continues on Lasix PO 80 mg BID as of 12/26.    -Prednisone 15 mg BID, MMF 1000 BID (CMV high risk); Prograf 3/4. 2 Doses of Thymo given 12/18, 12/19 due to and creat of 3.3. On Bactrim and Valacyclovir   -No biopsy this week. Will plan for biopsy next week.   -Prophylaxis- Nystatin.  Valcyte to begin on 12/26   -Chest tubes removed. Pacer wires in place.        GLO (acute kidney injury)  - Baseline creat was 1.3-1.6  - Acute elevation post transplant  - Will avoid nephrotoxins and monitor       CKD (chronic  kidney disease)  - Baseline creat appears 1.3-1.6.   - see above GLO          Dyllan Herzog MD  Heart Transplant  [facility]

## 2019-12-27 NOTE — PROGRESS NOTES
- Notified Dr. Barroso with [facility] that patient's HR has been sustaining in the high 70s at rest. (Per this RN's experience, HTS usually wants to be notified if a new heart transplant recipient begins having a sustained HR < 80 bpm. Patient's orders have the Epic default parameter to notify physician for HR < 50.)  - No new orders at this time; patient still has epicardial pacer wires in place (grounded and secured in Vacutainer tubes); Dobutamine infusion is at 2.5 mcg/kg/min (was decreased today). Patient is not on theophylline.  - As of midnight, HR is now sustaining around 80-83.  - Will continue to monitor HR.     12/26/19 2300 12/26/19 2301 12/26/19 2310   Vital Signs   Pulse 78 79 79   Heart Rate Source Continuous Continuous Continuous

## 2019-12-27 NOTE — PLAN OF CARE
Problem: Physical Therapy Goal  Goal: Physical Therapy Goal  Description  Goals to be met by: 2020     Patient will increase functional independence with mobility by performin.Supine to Sit with CGA - not met  2. Sit to stand with Supervision - not met  3. Gait x 150ft with Supervision - not met  4. Ascend/descend 4 stairs with CGA - not met  5. Standing for 3 minutes with CGA to increase activity tolerance - not met       Outcome: Ongoing, Progressing   Pt progressing towards goals. continue with PT POC.Goals remain appropriate.  Trevin Tripathi PTA

## 2019-12-27 NOTE — SUBJECTIVE & OBJECTIVE
Interval History: No issues overnight.  HR decreased to 80s and asymptomatic. Discussed with pharmacy and will start Theophylline 200 mg QD. Continues to feel chest wall discomfort. CXR reviewed and shows no acute abnormality. Working with PT/OT. Scr better at 1.6. Tacrolimus is 9.8. WBC is 18 and there are no acute signs of infection. Possibly WBC from steroids.     -Continue Lasix PO 80 mg BID  - wean off today  -Tacro levels up to 9.8 and will continue current dosing (3/4)  -Start Theophylline 200 mg QD today    Lines:   Midline: Right cephalic vein: 12/4/19  Pacer Wires: 12/16/19    Continuous Infusions:    Scheduled Meds:   aspirin  81 mg Oral Daily    atorvastatin  20 mg Oral Daily    ergocalciferol  50,000 Units Oral Daily    famotidine  20 mg Oral Daily    ferrous gluconate  324 mg Oral TID WM    furosemide  80 mg Oral BID    gabapentin  100 mg Oral TID    magnesium oxide  400 mg Oral BID    mirtazapine  15 mg Oral QHS    mycophenolate  1,000 mg Oral BID    nystatin  500,000 Units Mouth/Throat QID (WM & HS)    polyethylene glycol  17 g Oral Daily    predniSONE  10 mg Oral BID    sulfamethoxazole-trimethoprim 400-80mg  1 tablet Oral Daily    tacrolimus  3 mg Oral Daily AM    tacrolimus  4 mg Oral Daily PM    theophylline  200 mg Oral Daily    valGANciclovir  450 mg Oral Daily    venlafaxine  150 mg Oral Daily     PRN Meds:acetaminophen, Dextrose 10% Bolus, Dextrose 10% Bolus, glucagon (human recombinant), glucose, glucose, insulin aspart U-100, oxyCODONE, oxyCODONE, zolpidem    Review of patient's allergies indicates:   Allergen Reactions    Adhesive Blisters     Reaction to area in chest and up only    Codeine Itching     Objective:     Vital Signs (Most Recent):  Temp: 97.7 °F (36.5 °C) (12/27/19 1121)  Pulse: 76 (12/27/19 1125)  Resp: 18 (12/27/19 1125)  BP: 109/61 (12/27/19 1125)  SpO2: 95 % (12/27/19 1125) Vital Signs (24h Range):  Temp:  [97.7 °F (36.5 °C)-98.1 °F (36.7 °C)] 97.7  °F (36.5 °C)  Pulse:  [76-84] 76  Resp:  [13-20] 18  SpO2:  [93 %-98 %] 95 %  BP: (106-129)/(58-74) 109/61     Patient Vitals for the past 72 hrs (Last 3 readings):   Weight   12/27/19 0645 105.1 kg (231 lb 11.3 oz)   12/26/19 0700 103.5 kg (228 lb 4.6 oz)   12/25/19 0330 103.9 kg (229 lb 2.7 oz)     Body mass index is 37.4 kg/m².      Intake/Output Summary (Last 24 hours) at 12/27/2019 1434  Last data filed at 12/27/2019 1200  Gross per 24 hour   Intake 776.81 ml   Output 1900 ml   Net -1123.19 ml       Hemodynamic Parameters:       Telemetry: reviewed: sinus tach: rates in 90's    Physical Exam   Constitutional: She is oriented to person, place, and time. She appears well-developed and well-nourished.   HENT:   Head: Normocephalic.   Neck: Normal range of motion. JVD present.   Cardiovascular: Normal rate, regular rhythm and normal heart sounds.   No murmur heard.  Pulmonary/Chest: Effort normal and breath sounds normal.   Healing post surgical sternotomy wound    Abdominal: Soft. Bowel sounds are normal.   Musculoskeletal: Normal range of motion.   Neurological: She is alert and oriented to person, place, and time.   Skin: Skin is warm and dry. Capillary refill takes less than 2 seconds.   Constitutional: laying in bed NAD  Head: Normocephalic and atraumatic.   Eyes: Conjunctivae are normal.   Neck: Neck supple. Left IJ in place  Cardiovascular: Regular rate and rhythm; S1, S2 normal.  No rub, gallop or murmer  Pulmonary/Chest: Effort normal. Chest tubes intact. Serosanguinous drainage. Pacing wires in place.    Abdominal: Soft. There is no tenderness.   Musculoskeletal: She exhibits no deformity. Right femoral access site covered with dressing. No issues.    Neurological: She is alert.   Skin: Skin is warm and dry.   Psychiatric: Mood and affect appropriate    Nursing note and vitals reviewed.    Significant Labs:  CBC:  Recent Labs   Lab 12/25/19  0617 12/26/19  0629 12/27/19  0713   WBC 10.79 15.19* 18.77*    RBC 3.00* 3.05* 3.11*   HGB 8.4* 8.3* 8.6*   HCT 26.7* 27.6* 28.1*    337 336   MCV 89 91 90   MCH 28.0 27.2 27.7   MCHC 31.5* 30.1* 30.6*     BNP:  Recent Labs   Lab 12/24/19  0655   *     CMP:  Recent Labs   Lab 12/25/19  0617  12/26/19  0629 12/26/19  1452 12/27/19  0713   *   < > 157* 155* 144*   CALCIUM 8.2*   < > 8.5* 9.0 8.6*   ALBUMIN 3.0*  --  2.8*  --  2.9*   PROT 5.2*  --  5.2*  --  5.5*      < > 139 139 136   K 3.7   < > 4.3 4.6 4.4   CO2 30*   < > 29 30* 30*   CL 97   < > 101 99 98   BUN 46*   < > 43* 44* 40*   CREATININE 1.8*   < > 1.7* 1.8* 1.6*   ALKPHOS 71  --  76  --  79   ALT 31  --  30  --  25   AST 25  --  24  --  15   BILITOT 0.6  --  0.6  --  0.8    < > = values in this interval not displayed.      Coagulation:   Recent Labs   Lab 12/21/19  0530 12/22/19  0530 12/23/19  0530   INR 1.3* 1.4* 1.2     LDH:  No results for input(s): LDH in the last 72 hours.  Microbiology:  Microbiology Results (last 7 days)     ** No results found for the last 168 hours. **        have reviewed all pertinent labs within the past 24 hours.    Estimated Creatinine Clearance: 51.5 mL/min (A) (based on SCr of 1.6 mg/dL (H)).    Diagnostic Results:  I have reviewed all pertinent imaging results/findings within the past 24 hours.

## 2019-12-27 NOTE — PROGRESS NOTES
"- Notified Dr. Barroso with HTS that patient's incisional pain has been worse today (currently rated "8" out of 10).  - Patient states current pain medication (oxycodone 5 mg q4h PRN) has been somewhat helpful but she has been in pain almost all day today - of note, patient's MAR indicates she requested no analgesics at all from 12/20 - 12/26. RN offered to contact provider for more effective pain control.  - New orders received for oxycodone 10 mg q6h PRN. Administered.  "

## 2019-12-27 NOTE — PT/OT/SLP PROGRESS
Physical Therapy Treatment    Patient Name:  Deborah Navas   MRN:  [id number]    Recommendations:     Discharge Recommendations:  home health PT   Discharge Equipment Recommendations: none   Barriers to discharge: Inaccessible home    Assessment:     Deborah Navas is a 49 y.o. female admitted with a medical diagnosis of Status post heart transplant.  She presents with the following impairments/functional limitations:  weakness, impaired endurance, orthopedic precautions, decreased coordination, impaired self care skills, gait instability, impaired functional mobilty, pain .  Pt  motivated and cooperative with treatment session. Pt Progressing with PT Intervention.  Pt would continue to benefit from skilled PT to address overall functional mobility and goals. Goals remain appropriate    Rehab Prognosis: Good; patient would benefit from acute skilled PT services to address these deficits and reach maximum level of function.    Recent Surgery: Procedure(s) (LRB):  TRANSPLANT, HEART (N/A) 11 Days Post-Op    Plan:     During this hospitalization, patient to be seen 4 x/week to address the identified rehab impairments via gait training, therapeutic activities, therapeutic exercises, neuromuscular re-education and progress toward the following goals:    · Plan of Care Expires:  01/16/20    Subjective     Chief Complaint: fatigue and SOB    Pain/Comfort:  · Pain Rating 1: 0/10  · Pain Rating Post-Intervention 1: 0/10      Objective:     Communicated with RN prior to session.  Patient found seated with   upon PT entry to room.     General Precautions: Standard, sternal   Orthopedic Precautions:N/A   Braces: N/A     Functional Mobility:  ·   · not performed 2nd to pt found sitting in bed with family   · Transfers:     · Sit to Stand:  supervision with no AD from EOB; min A from  chair   · Gait: 110ft x 2  with CGA with HHA with mask on. Pt O2 sats went to 82% on RA with gait required rest break and return  to 90% and greater  Pt demo'd decreased jonathan   AM-PAC 6 CLICK MOBILITY  Turning over in bed (including adjusting bedclothes, sheets and blankets)?: 3  Sitting down on and standing up from a chair with arms (e.g., wheelchair, bedside commode, etc.): 3  Moving from lying on back to sitting on the side of the bed?: 3  Moving to and from a bed to a chair (including a wheelchair)?: 3  Need to walk in hospital room?: 3  Climbing 3-5 steps with a railing?: 2  Basic Mobility Total Score: 17       Therapeutic Activities and Exercises:   Educated patient on progress, safety,d/c,PT POC, on the effects of prolonged immobility and the importance of performing OOB activity and exercises to promote healing and reduce recovery time   Patient performed therex X 15 reps seated  B LE AROM AP, LAQ, Hip Flexion, Hip Abd/Add , B UE therex  Updated white board with appropriate PT mobility information for medical team notification  Donned an extra gown   Pt encouraged to ambulate in hallways 3x/day with nursing or family assistance to improve endurance.   Pt safe to ambulate in hallway with RN or PCT assistance   Bedside table in front of patient and area set up for function, convenience, and safety. RN aware of patient's mobility needs and status. Questions/concerns addressed within PTA scope of practice; patient with no further questions. Time was provided for active listening, discussion of health disposition, and discussion of safe discharge. Pt?verbalized?agreement       Patient left seated with all lines intact, call button in reach and nsg notified..    GOALS:   Multidisciplinary Problems     Physical Therapy Goals        Problem: Physical Therapy Goal    Goal Priority Disciplines Outcome Goal Variances Interventions   Physical Therapy Goal     PT, PT/OT Ongoing, Progressing     Description:  Goals to be met by: 2020     Patient will increase functional independence with mobility by performin.Supine to Sit with CGA -  not met  2. Sit to stand with Supervision - not met  3. Gait x 150ft with Supervision - not met  4. Ascend/descend 4 stairs with CGA - not met  5. Standing for 3 minutes with CGA to increase activity tolerance - not met                        Time Tracking:     PT Received On: 12/27/19  PT Start Time: 0923     PT Stop Time: 1002  PT Total Time (min): 39 min     Billable Minutes: Gait Training 15, Therapeutic Activity 12 and Therapeutic Exercise 12    Treatment Type: Treatment  PT/PTA: PTA     PTA Visit Number: 2     Trevin Tripathi, PTA  12/27/2019

## 2019-12-27 NOTE — ASSESSMENT & PLAN NOTE
-Transplant Date 12/16/2019. HM 3 implanted 9/10/19 removed during transplant.    -HTS Primary.  -Transplanted by: Dr. Nuñez.  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  -Hemodynamic support with:  5mcg/kg/mn and will start to wean. Continues on Lasix PO 80 mg BID as of 12/26.    -Prednisone 15 mg BID, MMF 1000 BID (CMV high risk); Prograf 3/4. 2 Doses of Thymo given 12/18, 12/19 due to and creat of 3.3. On Bactrim and Valacyclovir   -No biopsy this week. Will plan for biopsy next week.   -Prophylaxis- Nystatin.  Valcyte to begin on 12/26   -Chest tubes removed. Pacer wires in place.

## 2019-12-28 LAB
ALBUMIN SERPL BCP-MCNC: 2.8 G/DL (ref 3.5–5.2)
ALP SERPL-CCNC: 80 U/L (ref 55–135)
ALT SERPL W/O P-5'-P-CCNC: 22 U/L (ref 10–44)
ANION GAP SERPL CALC-SCNC: 12 MMOL/L (ref 8–16)
ANION GAP SERPL CALC-SCNC: 9 MMOL/L (ref 8–16)
AST SERPL-CCNC: 18 U/L (ref 10–40)
BASOPHILS # BLD AUTO: 0.01 K/UL (ref 0–0.2)
BASOPHILS NFR BLD: 0.1 % (ref 0–1.9)
BILIRUB SERPL-MCNC: 0.5 MG/DL (ref 0.1–1)
BUN SERPL-MCNC: 37 MG/DL (ref 6–20)
BUN SERPL-MCNC: 38 MG/DL (ref 6–20)
CALCIUM SERPL-MCNC: 8.7 MG/DL (ref 8.7–10.5)
CALCIUM SERPL-MCNC: 9 MG/DL (ref 8.7–10.5)
CHLORIDE SERPL-SCNC: 101 MMOL/L (ref 95–110)
CHLORIDE SERPL-SCNC: 99 MMOL/L (ref 95–110)
CO2 SERPL-SCNC: 28 MMOL/L (ref 23–29)
CO2 SERPL-SCNC: 30 MMOL/L (ref 23–29)
CREAT SERPL-MCNC: 1.6 MG/DL (ref 0.5–1.4)
CREAT SERPL-MCNC: 1.6 MG/DL (ref 0.5–1.4)
DIFFERENTIAL METHOD: ABNORMAL
EOSINOPHIL # BLD AUTO: 0 K/UL (ref 0–0.5)
EOSINOPHIL NFR BLD: 0.1 % (ref 0–8)
ERYTHROCYTE [DISTWIDTH] IN BLOOD BY AUTOMATED COUNT: 17 % (ref 11.5–14.5)
EST. GFR  (AFRICAN AMERICAN): 43 ML/MIN/1.73 M^2
EST. GFR  (AFRICAN AMERICAN): 43 ML/MIN/1.73 M^2
EST. GFR  (NON AFRICAN AMERICAN): 37.3 ML/MIN/1.73 M^2
EST. GFR  (NON AFRICAN AMERICAN): 37.3 ML/MIN/1.73 M^2
GLUCOSE SERPL-MCNC: 179 MG/DL (ref 70–110)
GLUCOSE SERPL-MCNC: 190 MG/DL (ref 70–110)
HCT VFR BLD AUTO: 26.5 % (ref 37–48.5)
HGB BLD-MCNC: 7.9 G/DL (ref 12–16)
IMM GRANULOCYTES # BLD AUTO: 0.12 K/UL (ref 0–0.04)
IMM GRANULOCYTES NFR BLD AUTO: 0.9 % (ref 0–0.5)
LYMPHOCYTES # BLD AUTO: 0.1 K/UL (ref 1–4.8)
LYMPHOCYTES NFR BLD: 0.6 % (ref 18–48)
MAGNESIUM SERPL-MCNC: 1.6 MG/DL (ref 1.6–2.6)
MCH RBC QN AUTO: 27.4 PG (ref 27–31)
MCHC RBC AUTO-ENTMCNC: 29.8 G/DL (ref 32–36)
MCV RBC AUTO: 92 FL (ref 82–98)
MONOCYTES # BLD AUTO: 0.6 K/UL (ref 0.3–1)
MONOCYTES NFR BLD: 4.3 % (ref 4–15)
NEUTROPHILS # BLD AUTO: 13.2 K/UL (ref 1.8–7.7)
NEUTROPHILS NFR BLD: 94 % (ref 38–73)
NRBC BLD-RTO: 0 /100 WBC
PLATELET # BLD AUTO: 364 K/UL (ref 150–350)
PMV BLD AUTO: 9.8 FL (ref 9.2–12.9)
POCT GLUCOSE: 138 MG/DL (ref 70–110)
POCT GLUCOSE: 162 MG/DL (ref 70–110)
POCT GLUCOSE: 201 MG/DL (ref 70–110)
POCT GLUCOSE: 206 MG/DL (ref 70–110)
POTASSIUM SERPL-SCNC: 3.9 MMOL/L (ref 3.5–5.1)
POTASSIUM SERPL-SCNC: 4.6 MMOL/L (ref 3.5–5.1)
PROT SERPL-MCNC: 5.9 G/DL (ref 6–8.4)
RBC # BLD AUTO: 2.88 M/UL (ref 4–5.4)
SODIUM SERPL-SCNC: 138 MMOL/L (ref 136–145)
SODIUM SERPL-SCNC: 141 MMOL/L (ref 136–145)
TACROLIMUS BLD-MCNC: 10.4 NG/ML (ref 5–15)
WBC # BLD AUTO: 14.04 K/UL (ref 3.9–12.7)

## 2019-12-28 PROCEDURE — 25000003 PHARM REV CODE 250: Performed by: INTERNAL MEDICINE

## 2019-12-28 PROCEDURE — 36415 COLL VENOUS BLD VENIPUNCTURE: CPT

## 2019-12-28 PROCEDURE — 63600175 PHARM REV CODE 636 W HCPCS: Performed by: INTERNAL MEDICINE

## 2019-12-28 PROCEDURE — 25000003 PHARM REV CODE 250: Performed by: NURSE PRACTITIONER

## 2019-12-28 PROCEDURE — 25000003 PHARM REV CODE 250: Performed by: PHYSICIAN ASSISTANT

## 2019-12-28 PROCEDURE — 80053 COMPREHEN METABOLIC PANEL: CPT

## 2019-12-28 PROCEDURE — 63600175 PHARM REV CODE 636 W HCPCS: Performed by: NURSE PRACTITIONER

## 2019-12-28 PROCEDURE — 85025 COMPLETE CBC W/AUTO DIFF WBC: CPT

## 2019-12-28 PROCEDURE — 99233 SBSQ HOSP IP/OBS HIGH 50: CPT | Mod: ,,, | Performed by: INTERNAL MEDICINE

## 2019-12-28 PROCEDURE — 20600001 HC STEP DOWN PRIVATE ROOM

## 2019-12-28 PROCEDURE — 99233 PR SUBSEQUENT HOSPITAL CARE,LEVL III: ICD-10-PCS | Mod: ,,, | Performed by: INTERNAL MEDICINE

## 2019-12-28 PROCEDURE — 80197 ASSAY OF TACROLIMUS: CPT

## 2019-12-28 PROCEDURE — 80048 BASIC METABOLIC PNL TOTAL CA: CPT

## 2019-12-28 PROCEDURE — 63600175 PHARM REV CODE 636 W HCPCS: Performed by: PHYSICIAN ASSISTANT

## 2019-12-28 PROCEDURE — 83735 ASSAY OF MAGNESIUM: CPT

## 2019-12-28 RX ORDER — AMOXICILLIN 250 MG
1 CAPSULE ORAL 2 TIMES DAILY PRN
Status: DISCONTINUED | OUTPATIENT
Start: 2019-12-28 | End: 2019-12-30 | Stop reason: HOSPADM

## 2019-12-28 RX ADMIN — POLYETHYLENE GLYCOL 3350 17 G: 17 POWDER, FOR SOLUTION ORAL at 08:12

## 2019-12-28 RX ADMIN — SULFAMETHOXAZOLE AND TRIMETHOPRIM 1 TABLET: 400; 80 TABLET ORAL at 08:12

## 2019-12-28 RX ADMIN — OXYCODONE HYDROCHLORIDE 10 MG: 10 TABLET ORAL at 10:12

## 2019-12-28 RX ADMIN — FAMOTIDINE 20 MG: 20 TABLET ORAL at 08:12

## 2019-12-28 RX ADMIN — Medication 400 MG: at 01:12

## 2019-12-28 RX ADMIN — NYSTATIN 500000 UNITS: 100000 SUSPENSION ORAL at 06:12

## 2019-12-28 RX ADMIN — GABAPENTIN 100 MG: 100 CAPSULE ORAL at 08:12

## 2019-12-28 RX ADMIN — NYSTATIN 500000 UNITS: 100000 SUSPENSION ORAL at 08:12

## 2019-12-28 RX ADMIN — Medication 400 MG: at 08:12

## 2019-12-28 RX ADMIN — FERROUS GLUCONATE TAB 324 MG (37.5 MG ELEMENTAL IRON) 324 MG: 324 (37.5 FE) TAB at 06:12

## 2019-12-28 RX ADMIN — NYSTATIN 500000 UNITS: 100000 SUSPENSION ORAL at 01:12

## 2019-12-28 RX ADMIN — OXYCODONE HYDROCHLORIDE 10 MG: 10 TABLET ORAL at 03:12

## 2019-12-28 RX ADMIN — FERROUS GLUCONATE TAB 324 MG (37.5 MG ELEMENTAL IRON) 324 MG: 324 (37.5 FE) TAB at 01:12

## 2019-12-28 RX ADMIN — INSULIN ASPART 2 UNITS: 100 INJECTION, SOLUTION INTRAVENOUS; SUBCUTANEOUS at 01:12

## 2019-12-28 RX ADMIN — MIRTAZAPINE 15 MG: 15 TABLET, FILM COATED ORAL at 08:12

## 2019-12-28 RX ADMIN — INSULIN ASPART 2 UNITS: 100 INJECTION, SOLUTION INTRAVENOUS; SUBCUTANEOUS at 06:12

## 2019-12-28 RX ADMIN — ZOLPIDEM TARTRATE 5 MG: 5 TABLET ORAL at 10:12

## 2019-12-28 RX ADMIN — ATORVASTATIN CALCIUM 20 MG: 20 TABLET, FILM COATED ORAL at 08:12

## 2019-12-28 RX ADMIN — FERROUS GLUCONATE TAB 324 MG (37.5 MG ELEMENTAL IRON) 324 MG: 324 (37.5 FE) TAB at 08:12

## 2019-12-28 RX ADMIN — VENLAFAXINE HYDROCHLORIDE 150 MG: 37.5 CAPSULE, EXTENDED RELEASE ORAL at 08:12

## 2019-12-28 RX ADMIN — MYCOPHENOLATE MOFETIL 1000 MG: 250 CAPSULE ORAL at 08:12

## 2019-12-28 RX ADMIN — OXYCODONE HYDROCHLORIDE 10 MG: 10 TABLET ORAL at 01:12

## 2019-12-28 RX ADMIN — OXYCODONE HYDROCHLORIDE 10 MG: 10 TABLET ORAL at 08:12

## 2019-12-28 RX ADMIN — ASPIRIN 81 MG: 81 TABLET, COATED ORAL at 08:12

## 2019-12-28 RX ADMIN — FUROSEMIDE 80 MG: 40 TABLET ORAL at 08:12

## 2019-12-28 RX ADMIN — THEOPHYLLINE ANHYDROUS 200 MG: 100 CAPSULE, EXTENDED RELEASE ORAL at 08:12

## 2019-12-28 RX ADMIN — ERGOCALCIFEROL 50000 UNITS: 1.25 CAPSULE ORAL at 08:12

## 2019-12-28 RX ADMIN — PREDNISONE 10 MG: 10 TABLET ORAL at 08:12

## 2019-12-28 RX ADMIN — FUROSEMIDE 80 MG: 40 TABLET ORAL at 06:12

## 2019-12-28 RX ADMIN — SENNOSIDES AND DOCUSATE SODIUM 1 TABLET: 8.6; 5 TABLET ORAL at 02:12

## 2019-12-28 RX ADMIN — OXYCODONE HYDROCHLORIDE 10 MG: 10 TABLET ORAL at 05:12

## 2019-12-28 RX ADMIN — VALGANCICLOVIR 450 MG: 450 TABLET, FILM COATED ORAL at 08:12

## 2019-12-28 RX ADMIN — TACROLIMUS 4 MG: 1 CAPSULE ORAL at 06:12

## 2019-12-28 RX ADMIN — GABAPENTIN 100 MG: 100 CAPSULE ORAL at 01:12

## 2019-12-28 RX ADMIN — TACROLIMUS 3 MG: 1 CAPSULE ORAL at 08:12

## 2019-12-28 NOTE — NURSING
* AAO x 4  * Blood glucose monitoring AC & HS. Blood glucose reading 203 Novolog 1 unit SQ adminsitered per sliding scale, see MAR. See chart for all blood glucose readings.  * Old chest tube site noted to the midline. Dressing clean dry and intact no signs or symptoms of infection noted.   * Cardiac diet patient tolerating well. See chart for % eaten.   * Fluid restriction 1.5 liters  * Dobutamine infusion d/c on day shift.   * Generalized ecchymosis noted   * Generalized edema noted.  * Bed at lowest position and locked, call light within reach, non-slip socks on, friend at bedside, and side rails up x 2.  Encouraged patient to call for assistance when needed patient and friend stated understanding. This RN visualized patient ambulating in hansen gait and balance WNL.  * Mid-sternal incision with steri strip & sutures site clean dry and intact no signs or symptoms of infection noted.  * Right upper arm midline (12/4/19) patent, dressing clean dry and intact no signs or symptoms of infection noted. Dressing due to be changed on 12/30/19, site to be changed on 1/20/19.  * Right FA PIV 22 G (12/27/19) patent, dressing clean dry and intact no signs or symptoms of infection noted.  * Oxygen saturation 100 % on room air.  Respirations even and unlabored no signs or symptoms of distress noted.  * Patient has complaints of pain to the incision PRN pain medication administered. Full relief noted.  * Pacer wires capped and secured. Dressing clean dry and intact no signs or symptoms of infection noted.  * Self medications preformed by patient 100 % correct, blue card updated and  medication bags refilled by this RN. All questions and concerns addressed by this RN.   * Skin assessment preformed no breakdown noted.  * Standard precautions maintained.  * Continuous telemetry monitoring continued SR 70-80s.  * Patient able to turn self no assistance needed.  * Patient voiding clear yellow urine.  See flow sheet for intake and  output totals.  * Visi continuous monitoring in use, see flow sheet for readings

## 2019-12-28 NOTE — PROGRESS NOTES
[facility]  Heart Transplant  Progress Note    Patient Name: Deborah Navas  MRN: [id number]  Admission Date: 11/13/2019  Hospital Length of Stay: 43 days  Attending Physician: Eric Antunez Jr.,*  Primary Care Provider: Primary Doctor No  Principal Problem:Status post heart transplant    Subjective:     Interval History: No issues overnight.  HR decreased to 70s-80s and remains asymptomatic. Has mild non-pitting edema of LE. We explained it may be related to steroid use. Scr continues to remain at 1.6 and has excellent UOP on oral Lasix 80 mg BID.    Today's Plan:  -Continue with Theophylline 200 mg and increase to BID dosing  -Tacrolimus is 10.4 and will continue dosing at 3/4  -Aim for first biopsy next week  -Discharge date will depend on HR control     Lines:   Midline: Right cephalic vein: 12/4/19  Pacer Wires: 12/16/19    Continuous Infusions:    Scheduled Meds:   aspirin  81 mg Oral Daily    atorvastatin  20 mg Oral Daily    famotidine  20 mg Oral Daily    ferrous gluconate  324 mg Oral TID WM    furosemide  80 mg Oral BID    gabapentin  100 mg Oral TID    magnesium oxide  400 mg Oral BID    mirtazapine  15 mg Oral QHS    mycophenolate  1,000 mg Oral BID    nystatin  500,000 Units Mouth/Throat QID (WM & HS)    polyethylene glycol  17 g Oral Daily    predniSONE  10 mg Oral BID    sulfamethoxazole-trimethoprim 400-80mg  1 tablet Oral Daily    tacrolimus  3 mg Oral Daily AM    tacrolimus  4 mg Oral Daily PM    theophylline  200 mg Oral BID    valGANciclovir  450 mg Oral Daily    venlafaxine  150 mg Oral Daily     PRN Meds:acetaminophen, Dextrose 10% Bolus, Dextrose 10% Bolus, glucagon (human recombinant), glucose, glucose, insulin aspart U-100, oxyCODONE, oxyCODONE, senna-docusate 8.6-50 mg, zolpidem    Review of patient's allergies indicates:   Allergen Reactions    Adhesive Blisters     Reaction to area in chest and up only    Codeine Itching     Objective:      Vital Signs (Most Recent):  Temp: 98 °F (36.7 °C) (12/28/19 0745)  Pulse: 80 (12/28/19 0745)  Resp: 18 (12/28/19 0745)  BP: 110/68 (12/28/19 0745)  SpO2: 95 % (12/28/19 0745) Vital Signs (24h Range):  Temp:  [97.7 °F (36.5 °C)-98 °F (36.7 °C)] 98 °F (36.7 °C)  Pulse:  [71-92] 80  Resp:  [12-18] 18  SpO2:  [95 %-97 %] 95 %  BP: (106-125)/(59-77) 110/68     Patient Vitals for the past 72 hrs (Last 3 readings):   Weight   12/28/19 0600 105.2 kg (231 lb 13 oz)   12/27/19 0645 105.1 kg (231 lb 11.3 oz)   12/26/19 0700 103.5 kg (228 lb 4.6 oz)     Body mass index is 37.42 kg/m².      Intake/Output Summary (Last 24 hours) at 12/28/2019 1041  Last data filed at 12/28/2019 0700  Gross per 24 hour   Intake 600 ml   Output 2450 ml   Net -1850 ml       Hemodynamic Parameters:       Telemetry: reviewed: sinus tach: rates in 90's    Physical Exam   Constitutional: She is oriented to person, place, and time. She appears well-developed and well-nourished.   HENT:   Head: Normocephalic.   Neck: Normal range of motion. JVD present.   Cardiovascular: Normal rate, regular rhythm and normal heart sounds.   No murmur heard.  Pulmonary/Chest: Effort normal and breath sounds normal.   Healing post surgical sternotomy wound    Abdominal: Soft. Bowel sounds are normal.   Musculoskeletal: Normal range of motion.   Neurological: She is alert and oriented to person, place, and time.   Skin: Skin is warm and dry. Capillary refill takes less than 2 seconds.   Constitutional: laying in bed NAD  Head: Normocephalic and atraumatic.   Eyes: Conjunctivae are normal.   Neck: Neck supple. Left IJ in place  Cardiovascular: Regular rate and rhythm; S1, S2 normal.  No rub, gallop or murmer  Pulmonary/Chest: Effort normal. Chest tubes intact. Serosanguinous drainage. Pacing wires in place.    Abdominal: Soft. There is no tenderness.   Musculoskeletal: She exhibits no deformity. Right femoral access site covered with dressing. No issues.    Neurological:  She is alert.   Skin: Skin is warm and dry.   Psychiatric: Mood and affect appropriate    Nursing note and vitals reviewed.    Significant Labs:  CBC:  Recent Labs   Lab 12/26/19  0629 12/27/19  0713 12/28/19  0630   WBC 15.19* 18.77* 14.04*   RBC 3.05* 3.11* 2.88*   HGB 8.3* 8.6* 7.9*   HCT 27.6* 28.1* 26.5*    336 364*   MCV 91 90 92   MCH 27.2 27.7 27.4   MCHC 30.1* 30.6* 29.8*     BNP:  Recent Labs   Lab 12/24/19  0655   *     CMP:  Recent Labs   Lab 12/26/19  0629  12/27/19  0713 12/27/19  1523 12/28/19  0630   *   < > 144* 163* 179*   CALCIUM 8.5*   < > 8.6* 8.6* 8.7   ALBUMIN 2.8*  --  2.9*  --  2.8*   PROT 5.2*  --  5.5*  --  5.9*      < > 136 138 138   K 4.3   < > 4.4 4.7 4.6   CO2 29   < > 30* 28 30*      < > 98 100 99   BUN 43*   < > 40* 41* 37*   CREATININE 1.7*   < > 1.6* 1.6* 1.6*   ALKPHOS 76  --  79  --  80   ALT 30  --  25  --  22   AST 24  --  15  --  18   BILITOT 0.6  --  0.8  --  0.5    < > = values in this interval not displayed.      Coagulation:   Recent Labs   Lab 12/22/19  0530 12/23/19  0530   INR 1.4* 1.2     LDH:  No results for input(s): LDH in the last 72 hours.  Microbiology:  Microbiology Results (last 7 days)     ** No results found for the last 168 hours. **        have reviewed all pertinent labs within the past 24 hours.    Estimated Creatinine Clearance: 51.6 mL/min (A) (based on SCr of 1.6 mg/dL (H)).    Diagnostic Results:  I have reviewed all pertinent imaging results/findings within the past 24 hours.    Assessment and Plan:     50 yo WF with NICM, s/p HM3 implantation 9/10/19 (post up coarse uncomplicated with the exception of+ diaphragmatic stimulation of LV lead and pleural effusion with chest tube placement, atrial flutter with NADINE/DCCV), V-fib reported in setting of hypokalemia with appropriate shock from ICD on Amiodarone prior to LVAD placement; and recent hospitalizations for ventricular arrythmias; started on Mexilitine.  She was seen  in EP clinic today and she continues to have multiple VT episodes with some ATP therapy, although no ICD shocks since starting mexiletine 10/24/2019. One slow VT episode 2.5hrs 11/3/2019. Patient asymptomatic with LVAD. On coumadin. No AFL since post-op event (paced out of rhythm 9/24/2019). CHB with 100% V pacing (LV lead off). She does not feel well. Dry heaving with mexiletine. Taking phenergan PRN. 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   She was seen in HTS clinic and reported 4 low flow alarms the last 4 nights.  She reports they occur in her sleep and last for only a few seconds.  By the time she wakes up; they quickly stop.  She does report to possibly being little volume overloaded.  She hasn't noticed weight gain at home but thinks she may have little extra fluid.  She denies SOB, chest pain, palpitations, LEGER. In review of her records: she was 223lbs on 10/24/19 during previous hospitalization and is 235lbs now.  Her lasix had previously been decreased to prn.     * Status post heart transplant  -Transplant Date 12/16/2019. HM 3 implanted 9/10/19 removed during transplant.    -HTS Primary.  -Transplanted by: Dr. Nuñez.  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  -Hemodynamic support with:  5mcg/kg/mn and will start to wean. Continues on Lasix PO 80 mg BID as of 12/26.    -Prednisone 15 mg BID, MMF 1000 BID (CMV high risk); Prograf 3/4. 2 Doses of Thymo given 12/18, 12/19 due to and creat of 3.3. On Bactrim and Valacyclovir   -No biopsy this week. Will plan for biopsy next week.   -Prophylaxis- Nystatin.  Valcyte to begin on 12/26   -Chest tubes removed. Pacer wires in place.        GLO (acute kidney injury)  - Baseline creat was 1.3-1.6  - Acute elevation post transplant  - Will avoid nephrotoxins and monitor       CKD (chronic kidney disease)  - Baseline creat appears 1.3-1.6.   - see above GLO          Dyllan Herzog MD  Heart  Transplant  [facility]

## 2019-12-28 NOTE — NURSING
White pacer wire x1 out of place on dressing change.  Notified Dr Herzog: no change to tele, pt reports feeling well

## 2019-12-28 NOTE — SUBJECTIVE & OBJECTIVE
Interval History: No issues overnight.  HR decreased to 70s-80s and remains asymptomatic. Has mild non-pitting edema of LE. We explained it may be related to steroid use. Scr continues to remain at 1.6 and has excellent UOP on oral Lasix 80 mg BID.    Today's Plan:  -Continue with Theophylline 200 mg and increase to BID dosing  -Tacrolimus is 10.4 and will continue dosing at 3/4  -Aim for first biopsy next week  -Discharge date will depend on HR control     Lines:   Midline: Right cephalic vein: 12/4/19  Pacer Wires: 12/16/19    Continuous Infusions:    Scheduled Meds:   aspirin  81 mg Oral Daily    atorvastatin  20 mg Oral Daily    famotidine  20 mg Oral Daily    ferrous gluconate  324 mg Oral TID WM    furosemide  80 mg Oral BID    gabapentin  100 mg Oral TID    magnesium oxide  400 mg Oral BID    mirtazapine  15 mg Oral QHS    mycophenolate  1,000 mg Oral BID    nystatin  500,000 Units Mouth/Throat QID (WM & HS)    polyethylene glycol  17 g Oral Daily    predniSONE  10 mg Oral BID    sulfamethoxazole-trimethoprim 400-80mg  1 tablet Oral Daily    tacrolimus  3 mg Oral Daily AM    tacrolimus  4 mg Oral Daily PM    theophylline  200 mg Oral BID    valGANciclovir  450 mg Oral Daily    venlafaxine  150 mg Oral Daily     PRN Meds:acetaminophen, Dextrose 10% Bolus, Dextrose 10% Bolus, glucagon (human recombinant), glucose, glucose, insulin aspart U-100, oxyCODONE, oxyCODONE, senna-docusate 8.6-50 mg, zolpidem    Review of patient's allergies indicates:   Allergen Reactions    Adhesive Blisters     Reaction to area in chest and up only    Codeine Itching     Objective:     Vital Signs (Most Recent):  Temp: 98 °F (36.7 °C) (12/28/19 0745)  Pulse: 80 (12/28/19 0745)  Resp: 18 (12/28/19 0745)  BP: 110/68 (12/28/19 0745)  SpO2: 95 % (12/28/19 0745) Vital Signs (24h Range):  Temp:  [97.7 °F (36.5 °C)-98 °F (36.7 °C)] 98 °F (36.7 °C)  Pulse:  [71-92] 80  Resp:  [12-18] 18  SpO2:  [95 %-97 %] 95 %  BP:  (106-125)/(59-77) 110/68     Patient Vitals for the past 72 hrs (Last 3 readings):   Weight   12/28/19 0600 105.2 kg (231 lb 13 oz)   12/27/19 0645 105.1 kg (231 lb 11.3 oz)   12/26/19 0700 103.5 kg (228 lb 4.6 oz)     Body mass index is 37.42 kg/m².      Intake/Output Summary (Last 24 hours) at 12/28/2019 1041  Last data filed at 12/28/2019 0700  Gross per 24 hour   Intake 600 ml   Output 2450 ml   Net -1850 ml       Hemodynamic Parameters:       Telemetry: reviewed: sinus tach: rates in 90's    Physical Exam   Constitutional: She is oriented to person, place, and time. She appears well-developed and well-nourished.   HENT:   Head: Normocephalic.   Neck: Normal range of motion. JVD present.   Cardiovascular: Normal rate, regular rhythm and normal heart sounds.   No murmur heard.  Pulmonary/Chest: Effort normal and breath sounds normal.   Healing post surgical sternotomy wound    Abdominal: Soft. Bowel sounds are normal.   Musculoskeletal: Normal range of motion.   Neurological: She is alert and oriented to person, place, and time.   Skin: Skin is warm and dry. Capillary refill takes less than 2 seconds.   Constitutional: laying in bed NAD  Head: Normocephalic and atraumatic.   Eyes: Conjunctivae are normal.   Neck: Neck supple. Left IJ in place  Cardiovascular: Regular rate and rhythm; S1, S2 normal.  No rub, gallop or murmer  Pulmonary/Chest: Effort normal. Chest tubes intact. Serosanguinous drainage. Pacing wires in place.    Abdominal: Soft. There is no tenderness.   Musculoskeletal: She exhibits no deformity. Right femoral access site covered with dressing. No issues.    Neurological: She is alert.   Skin: Skin is warm and dry.   Psychiatric: Mood and affect appropriate    Nursing note and vitals reviewed.    Significant Labs:  CBC:  Recent Labs   Lab 12/26/19  0629 12/27/19  0713 12/28/19  0630   WBC 15.19* 18.77* 14.04*   RBC 3.05* 3.11* 2.88*   HGB 8.3* 8.6* 7.9*   HCT 27.6* 28.1* 26.5*    336 364*    MCV 91 90 92   MCH 27.2 27.7 27.4   MCHC 30.1* 30.6* 29.8*     BNP:  Recent Labs   Lab 12/24/19  0655   *     CMP:  Recent Labs   Lab 12/26/19  0629  12/27/19  0713 12/27/19  1523 12/28/19  0630   *   < > 144* 163* 179*   CALCIUM 8.5*   < > 8.6* 8.6* 8.7   ALBUMIN 2.8*  --  2.9*  --  2.8*   PROT 5.2*  --  5.5*  --  5.9*      < > 136 138 138   K 4.3   < > 4.4 4.7 4.6   CO2 29   < > 30* 28 30*      < > 98 100 99   BUN 43*   < > 40* 41* 37*   CREATININE 1.7*   < > 1.6* 1.6* 1.6*   ALKPHOS 76  --  79  --  80   ALT 30  --  25  --  22   AST 24  --  15  --  18   BILITOT 0.6  --  0.8  --  0.5    < > = values in this interval not displayed.      Coagulation:   Recent Labs   Lab 12/22/19  0530 12/23/19  0530   INR 1.4* 1.2     LDH:  No results for input(s): LDH in the last 72 hours.  Microbiology:  Microbiology Results (last 7 days)     ** No results found for the last 168 hours. **        have reviewed all pertinent labs within the past 24 hours.    Estimated Creatinine Clearance: 51.6 mL/min (A) (based on SCr of 1.6 mg/dL (H)).    Diagnostic Results:  I have reviewed all pertinent imaging results/findings within the past 24 hours.

## 2019-12-28 NOTE — CARE UPDATE
BG goal 140-180    Remains in TSU, NAEON  BG fairly well controlled with minimal correction scale insulin needs  Prednisone 10 mg PO BID  Noted creatinine of 1.6  Plan:    Continue low dose correction scale insulin  BG monitoring AC/HS    Discharge planning: TBD. Please notify endocrinology prior to discharge.     Endocrine to continue to follow    ** Please call Endocrine for any BG related issues **

## 2019-12-28 NOTE — NURSING
Spoke with MD on call she stated to notify her if HR drops less than 70 beats/min. Will continue to monitor.

## 2019-12-29 LAB
ANION GAP SERPL CALC-SCNC: 12 MMOL/L (ref 8–16)
BASOPHILS # BLD AUTO: 0.02 K/UL (ref 0–0.2)
BASOPHILS NFR BLD: 0.1 % (ref 0–1.9)
BUN SERPL-MCNC: 31 MG/DL (ref 6–20)
CALCIUM SERPL-MCNC: 8.9 MG/DL (ref 8.7–10.5)
CHLORIDE SERPL-SCNC: 99 MMOL/L (ref 95–110)
CO2 SERPL-SCNC: 29 MMOL/L (ref 23–29)
CREAT SERPL-MCNC: 1.4 MG/DL (ref 0.5–1.4)
DIFFERENTIAL METHOD: ABNORMAL
EOSINOPHIL # BLD AUTO: 0 K/UL (ref 0–0.5)
EOSINOPHIL NFR BLD: 0.3 % (ref 0–8)
ERYTHROCYTE [DISTWIDTH] IN BLOOD BY AUTOMATED COUNT: 16.9 % (ref 11.5–14.5)
EST. GFR  (AFRICAN AMERICAN): 50.5 ML/MIN/1.73 M^2
EST. GFR  (NON AFRICAN AMERICAN): 43.8 ML/MIN/1.73 M^2
GLUCOSE SERPL-MCNC: 126 MG/DL (ref 70–110)
HCT VFR BLD AUTO: 28.4 % (ref 37–48.5)
HGB BLD-MCNC: 8.3 G/DL (ref 12–16)
IMM GRANULOCYTES # BLD AUTO: 0.12 K/UL (ref 0–0.04)
IMM GRANULOCYTES NFR BLD AUTO: 0.9 % (ref 0–0.5)
LYMPHOCYTES # BLD AUTO: 0.1 K/UL (ref 1–4.8)
LYMPHOCYTES NFR BLD: 0.5 % (ref 18–48)
MAGNESIUM SERPL-MCNC: 1.6 MG/DL (ref 1.6–2.6)
MCH RBC QN AUTO: 27 PG (ref 27–31)
MCHC RBC AUTO-ENTMCNC: 29.2 G/DL (ref 32–36)
MCV RBC AUTO: 93 FL (ref 82–98)
MONOCYTES # BLD AUTO: 0.6 K/UL (ref 0.3–1)
MONOCYTES NFR BLD: 4.3 % (ref 4–15)
NEUTROPHILS # BLD AUTO: 13.2 K/UL (ref 1.8–7.7)
NEUTROPHILS NFR BLD: 93.9 % (ref 38–73)
NRBC BLD-RTO: 0 /100 WBC
PLATELET # BLD AUTO: 368 K/UL (ref 150–350)
PMV BLD AUTO: 9.7 FL (ref 9.2–12.9)
POCT GLUCOSE: 126 MG/DL (ref 70–110)
POCT GLUCOSE: 162 MG/DL (ref 70–110)
POCT GLUCOSE: 218 MG/DL (ref 70–110)
POTASSIUM SERPL-SCNC: 4.1 MMOL/L (ref 3.5–5.1)
RBC # BLD AUTO: 3.07 M/UL (ref 4–5.4)
SODIUM SERPL-SCNC: 140 MMOL/L (ref 136–145)
TACROLIMUS BLD-MCNC: 11 NG/ML (ref 5–15)
WBC # BLD AUTO: 14.03 K/UL (ref 3.9–12.7)

## 2019-12-29 PROCEDURE — 80197 ASSAY OF TACROLIMUS: CPT

## 2019-12-29 PROCEDURE — 94761 N-INVAS EAR/PLS OXIMETRY MLT: CPT

## 2019-12-29 PROCEDURE — 63600175 PHARM REV CODE 636 W HCPCS: Performed by: INTERNAL MEDICINE

## 2019-12-29 PROCEDURE — 63600175 PHARM REV CODE 636 W HCPCS: Performed by: NURSE PRACTITIONER

## 2019-12-29 PROCEDURE — 25000003 PHARM REV CODE 250: Performed by: PHYSICIAN ASSISTANT

## 2019-12-29 PROCEDURE — 36415 COLL VENOUS BLD VENIPUNCTURE: CPT

## 2019-12-29 PROCEDURE — 99233 PR SUBSEQUENT HOSPITAL CARE,LEVL III: ICD-10-PCS | Mod: ,,, | Performed by: INTERNAL MEDICINE

## 2019-12-29 PROCEDURE — 25000003 PHARM REV CODE 250: Performed by: INTERNAL MEDICINE

## 2019-12-29 PROCEDURE — 20600001 HC STEP DOWN PRIVATE ROOM

## 2019-12-29 PROCEDURE — 83735 ASSAY OF MAGNESIUM: CPT

## 2019-12-29 PROCEDURE — 63600175 PHARM REV CODE 636 W HCPCS: Performed by: PHYSICIAN ASSISTANT

## 2019-12-29 PROCEDURE — 25000003 PHARM REV CODE 250: Performed by: NURSE PRACTITIONER

## 2019-12-29 PROCEDURE — 85025 COMPLETE CBC W/AUTO DIFF WBC: CPT

## 2019-12-29 PROCEDURE — 80048 BASIC METABOLIC PNL TOTAL CA: CPT

## 2019-12-29 PROCEDURE — 99233 SBSQ HOSP IP/OBS HIGH 50: CPT | Mod: ,,, | Performed by: INTERNAL MEDICINE

## 2019-12-29 RX ADMIN — NYSTATIN 500000 UNITS: 100000 SUSPENSION ORAL at 06:12

## 2019-12-29 RX ADMIN — PREDNISONE 10 MG: 10 TABLET ORAL at 08:12

## 2019-12-29 RX ADMIN — NYSTATIN 500000 UNITS: 100000 SUSPENSION ORAL at 02:12

## 2019-12-29 RX ADMIN — OXYCODONE HYDROCHLORIDE 10 MG: 10 TABLET ORAL at 03:12

## 2019-12-29 RX ADMIN — OXYCODONE HYDROCHLORIDE 10 MG: 10 TABLET ORAL at 08:12

## 2019-12-29 RX ADMIN — ZOLPIDEM TARTRATE 5 MG: 5 TABLET ORAL at 10:12

## 2019-12-29 RX ADMIN — POLYETHYLENE GLYCOL 3350 17 G: 17 POWDER, FOR SOLUTION ORAL at 08:12

## 2019-12-29 RX ADMIN — INSULIN ASPART 2 UNITS: 100 INJECTION, SOLUTION INTRAVENOUS; SUBCUTANEOUS at 06:12

## 2019-12-29 RX ADMIN — NYSTATIN 500000 UNITS: 100000 SUSPENSION ORAL at 08:12

## 2019-12-29 RX ADMIN — SENNOSIDES AND DOCUSATE SODIUM 1 TABLET: 8.6; 5 TABLET ORAL at 08:12

## 2019-12-29 RX ADMIN — GABAPENTIN 100 MG: 100 CAPSULE ORAL at 08:12

## 2019-12-29 RX ADMIN — Medication 400 MG: at 08:12

## 2019-12-29 RX ADMIN — VENLAFAXINE HYDROCHLORIDE 150 MG: 37.5 CAPSULE, EXTENDED RELEASE ORAL at 08:12

## 2019-12-29 RX ADMIN — ASPIRIN 81 MG: 81 TABLET, COATED ORAL at 08:12

## 2019-12-29 RX ADMIN — MIRTAZAPINE 15 MG: 15 TABLET, FILM COATED ORAL at 08:12

## 2019-12-29 RX ADMIN — GABAPENTIN 100 MG: 100 CAPSULE ORAL at 02:12

## 2019-12-29 RX ADMIN — FUROSEMIDE 80 MG: 40 TABLET ORAL at 06:12

## 2019-12-29 RX ADMIN — FERROUS GLUCONATE TAB 324 MG (37.5 MG ELEMENTAL IRON) 324 MG: 324 (37.5 FE) TAB at 08:12

## 2019-12-29 RX ADMIN — MYCOPHENOLATE MOFETIL 1000 MG: 250 CAPSULE ORAL at 08:12

## 2019-12-29 RX ADMIN — THEOPHYLLINE ANHYDROUS 200 MG: 100 CAPSULE, EXTENDED RELEASE ORAL at 08:12

## 2019-12-29 RX ADMIN — FUROSEMIDE 80 MG: 40 TABLET ORAL at 08:12

## 2019-12-29 RX ADMIN — FERROUS GLUCONATE TAB 324 MG (37.5 MG ELEMENTAL IRON) 324 MG: 324 (37.5 FE) TAB at 06:12

## 2019-12-29 RX ADMIN — ATORVASTATIN CALCIUM 20 MG: 20 TABLET, FILM COATED ORAL at 08:12

## 2019-12-29 RX ADMIN — VALGANCICLOVIR 450 MG: 450 TABLET, FILM COATED ORAL at 08:12

## 2019-12-29 RX ADMIN — SULFAMETHOXAZOLE AND TRIMETHOPRIM 1 TABLET: 400; 80 TABLET ORAL at 08:12

## 2019-12-29 RX ADMIN — TACROLIMUS 4 MG: 1 CAPSULE ORAL at 06:12

## 2019-12-29 RX ADMIN — TACROLIMUS 3 MG: 1 CAPSULE ORAL at 08:12

## 2019-12-29 RX ADMIN — FAMOTIDINE 20 MG: 20 TABLET ORAL at 08:12

## 2019-12-29 RX ADMIN — FERROUS GLUCONATE TAB 324 MG (37.5 MG ELEMENTAL IRON) 324 MG: 324 (37.5 FE) TAB at 02:12

## 2019-12-29 NOTE — PLAN OF CARE
- MD returned call this afternoon, they will not remove pacer wires today, expect in AM  - Pt pulling self meds 100% accurately  - R fem LVAD insertion site with hardened area under incision - U/S done, expect MD to review  - Grounded pacer wires x3 intact  - Driveline site, AICD site & midsternal incision healing well  - Dressing to chest tube removal site CDI  - Theophylline BID, HR 82-96 this shift  - Senakot BID PRN  - CBG ACHS, SSI as indicated

## 2019-12-29 NOTE — ASSESSMENT & PLAN NOTE
-Transplant Date 12/16/2019. HM 3 implanted 9/10/19 removed during transplant.    -HTS Primary.  -Transplanted by: Dr. Nuñez.  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  - weaned off. Continues Lasix PO 80 mg BID as of 12/26.    -Prednisone 10 mg BID, MMF 1000 BID (CMV high risk); Prograf 3/4. 2 Doses of Thymo given 12/18, 12/19 due to and creat of 3.3. On Bactrim and Valacyclovir   -No biopsy this week. Will plan for biopsy next week.   -Prophylaxis- Nystatin.  Valcyte to begin on 12/26   -Chest tubes removed. Pacer wires in place and will plan to DC today  -Possible DC tomorrow    -Right groin discomfort this PM and will get an US

## 2019-12-29 NOTE — CARE UPDATE
BG goal 140 -180     BG is reasonably controlled on current PRN SQ insulin regimen.     Continue:    Low Dose SQ Insulin Correction Scale.  BG Monitoring AC/HS     ** Please call Endocrine for any BG related issues **  ** Please notify Endocrine for any change and/or advance in diet**    Discharge Planning:     TBD. Please notify endocrinology prior to discharge.

## 2019-12-29 NOTE — PLAN OF CARE
* AAO x 4  * Blood glucose monitoring AC & HS. Blood glucose reading 162 See chart for all blood glucose readings.  * Old chest tube site noted to the midline. Dressing clean dry and intact no signs or symptoms of infection noted.   * Cardiac diet patient tolerating well. See chart for % eaten.   * Fluid restriction 1.5 liters  * Generalized ecchymosis noted   * Generalized edema noted.  * Bed at lowest position and locked, call light within reach, non-slip socks on, friend at bedside, and side rails up x 2.  Encouraged patient to call for assistance when needed patient and friend stated understanding. This RN visualized patient ambulating in room gait and balance WNL.  * Mid-sternal incision with steri strip & sutures site clean dry and intact no signs or symptoms of infection noted.  * Right upper arm midline (12/4/19) patent, dressing clean dry and intact no signs or symptoms of infection noted. Dressing due to be changed on 12/30/19, site to be changed on 1/20/19.  * Right FA PIV 22 G (12/27/19) patent, dressing clean dry and intact no signs or symptoms of infection noted.  * Oxygen saturation 100 % on room air.  Respirations even and unlabored no signs or symptoms of distress noted.  * Patient has complaints of pain to the incision PRN pain medication administered. Full relief noted.  * Pacer wires capped and secured. One while wire dislodged on day shift, primary team aware. Wire secured. Dressing clean dry and intact no signs or symptoms of infection noted.  * Self medications preformed by patient 100 % correct, blue card updated and  medication bags refilled by this RN. All questions and concerns addressed by this RN.   * Skin assessment preformed no breakdown noted.  * Standard precautions maintained.  * Continuous telemetry monitoring continued SR 70-90s.  * Patient able to turn self no assistance needed.  * Patient voiding clear yellow urine.  See flow sheet for intake and output totals.  * Visi  continuous monitoring in use, see flow sheet for readings

## 2019-12-29 NOTE — PROGRESS NOTES
[facility]  Heart Transplant  Progress Note    Patient Name: Deborah Navas  MRN: [id number]  Admission Date: 11/13/2019  Hospital Length of Stay: 44 days  Attending Physician: Eric Antunez Jr.,*  Primary Care Provider: Primary Doctor No  Principal Problem:Status post heart transplant    Subjective:     Interval History: No issues overnight.  HR improved from 70s to mid 90s, asymptomatic and SBP is 120s. Has mild non-pitting edema of LE that she feels started since suing steroids.     Has an excellent UOP on PO Lasix 80 mg BID. Scr continues to improve now at 1.4.    Today's Plan:  -Continue with Theophylline 200 mg BID   -Tacrolimus is 11 and will continue dosing at 3/4  -Aim for first biopsy next week (Tuesday)  -Discharge tomorrow if HR better (at least at 90s bpm) and consider increasing Theophylline further as needed     Lines:   Midline: Right cephalic vein: 12/4/19  Pacer Wires: 12/16/19 (to be removed today or prior to DC)    Continuous Infusions:    Scheduled Meds:   aspirin  81 mg Oral Daily    atorvastatin  20 mg Oral Daily    famotidine  20 mg Oral Daily    ferrous gluconate  324 mg Oral TID WM    furosemide  80 mg Oral BID    gabapentin  100 mg Oral TID    magnesium oxide  400 mg Oral BID    mirtazapine  15 mg Oral QHS    mycophenolate  1,000 mg Oral BID    nystatin  500,000 Units Mouth/Throat QID (WM & HS)    polyethylene glycol  17 g Oral Daily    predniSONE  10 mg Oral BID    sulfamethoxazole-trimethoprim 400-80mg  1 tablet Oral Daily    tacrolimus  3 mg Oral Daily AM    tacrolimus  4 mg Oral Daily PM    theophylline  200 mg Oral BID    valGANciclovir  450 mg Oral Daily    venlafaxine  150 mg Oral Daily     PRN Meds:acetaminophen, Dextrose 10% Bolus, Dextrose 10% Bolus, glucagon (human recombinant), glucose, glucose, insulin aspart U-100, oxyCODONE, oxyCODONE, senna-docusate 8.6-50 mg, zolpidem    Review of patient's allergies indicates:   Allergen  Reactions    Adhesive Blisters     Reaction to area in chest and up only    Codeine Itching     Objective:     Vital Signs (Most Recent):  Temp: (P) 98 °F (36.7 °C) (12/29/19 0826)  Pulse: 83 (12/29/19 0826)  Resp: 18 (12/29/19 0826)  BP: 127/70 (12/29/19 0826)  SpO2: 98 % (12/29/19 0826) Vital Signs (24h Range):  Temp:  [97.5 °F (36.4 °C)-98.3 °F (36.8 °C)] (P) 98 °F (36.7 °C)  Pulse:  [] 83  Resp:  [10-18] 18  SpO2:  [97 %-99 %] 98 %  BP: (108-127)/(55-71) 127/70     Patient Vitals for the past 72 hrs (Last 3 readings):   Weight   12/29/19 0522 105.6 kg (232 lb 12.9 oz)   12/28/19 0600 105.2 kg (231 lb 13 oz)   12/27/19 0645 105.1 kg (231 lb 11.3 oz)     Body mass index is 37.58 kg/m².      Intake/Output Summary (Last 24 hours) at 12/29/2019 1051  Last data filed at 12/29/2019 0300  Gross per 24 hour   Intake 1160 ml   Output 2600 ml   Net -1440 ml       Hemodynamic Parameters:       Telemetry: reviewed: sinus tach: rates in 90's    Physical Exam   Constitutional: She is oriented to person, place, and time. She appears well-developed and well-nourished.   HENT:   Head: Normocephalic.   Neck: Normal range of motion. JVD present.   Cardiovascular: Normal rate, regular rhythm and normal heart sounds.   No murmur heard.  Pulmonary/Chest: Effort normal and breath sounds normal.   Healing post surgical sternotomy wound    Abdominal: Soft. Bowel sounds are normal.   Musculoskeletal: Normal range of motion.   Neurological: She is alert and oriented to person, place, and time.   Skin: Skin is warm and dry. Capillary refill takes less than 2 seconds.   Constitutional: laying in bed NAD  Head: Normocephalic and atraumatic.   Eyes: Conjunctivae are normal.   Neck: Neck supple. Left IJ in place  Cardiovascular: Regular rate and rhythm; S1, S2 normal.  No rub, gallop or murmer  Pulmonary/Chest: Effort normal. Chest tubes intact. Serosanguinous drainage. Pacing wires in place.    Abdominal: Soft. There is no tenderness.    Musculoskeletal: She exhibits no deformity. Right femoral access site covered with dressing. No issues.    Neurological: She is alert.   Skin: Skin is warm and dry.   Psychiatric: Mood and affect appropriate    Nursing note and vitals reviewed.    Significant Labs:  CBC:  Recent Labs   Lab 12/27/19  0713 12/28/19  0630 12/29/19  0643   WBC 18.77* 14.04* 14.03*   RBC 3.11* 2.88* 3.07*   HGB 8.6* 7.9* 8.3*   HCT 28.1* 26.5* 28.4*    364* 368*   MCV 90 92 93   MCH 27.7 27.4 27.0   MCHC 30.6* 29.8* 29.2*     BNP:  Recent Labs   Lab 12/24/19  0655   *     CMP:  Recent Labs   Lab 12/26/19  0629  12/27/19  0713  12/28/19  0630 12/28/19  1505 12/29/19  0643   *   < > 144*   < > 179* 190* 126*   CALCIUM 8.5*   < > 8.6*   < > 8.7 9.0 8.9   ALBUMIN 2.8*  --  2.9*  --  2.8*  --   --    PROT 5.2*  --  5.5*  --  5.9*  --   --       < > 136   < > 138 141 140   K 4.3   < > 4.4   < > 4.6 3.9 4.1   CO2 29   < > 30*   < > 30* 28 29      < > 98   < > 99 101 99   BUN 43*   < > 40*   < > 37* 38* 31*   CREATININE 1.7*   < > 1.6*   < > 1.6* 1.6* 1.4   ALKPHOS 76  --  79  --  80  --   --    ALT 30  --  25  --  22  --   --    AST 24  --  15  --  18  --   --    BILITOT 0.6  --  0.8  --  0.5  --   --     < > = values in this interval not displayed.      Coagulation:   Recent Labs   Lab 12/23/19  0530   INR 1.2     LDH:  No results for input(s): LDH in the last 72 hours.  Microbiology:  Microbiology Results (last 7 days)     ** No results found for the last 168 hours. **        have reviewed all pertinent labs within the past 24 hours.    Estimated Creatinine Clearance: 59 mL/min (based on SCr of 1.4 mg/dL).    Diagnostic Results:  I have reviewed all pertinent imaging results/findings within the past 24 hours.    Assessment and Plan:     50 yo WF with NICM, s/p HM3 implantation 9/10/19 (post up coarse uncomplicated with the exception of+ diaphragmatic stimulation of LV lead and pleural effusion with chest  tube placement, atrial flutter with NADINE/DCCV), V-fib reported in setting of hypokalemia with appropriate shock from ICD on Amiodarone prior to LVAD placement; and recent hospitalizations for ventricular arrythmias; started on Mexilitine.  She was seen in EP clinic today and she continues to have multiple VT episodes with some ATP therapy, although no ICD shocks since starting mexiletine 10/24/2019. One slow VT episode 2.5hrs 11/3/2019. Patient asymptomatic with LVAD. On coumadin. No AFL since post-op event (paced out of rhythm 9/24/2019). CHB with 100% V pacing (LV lead off). She does not feel well. Dry heaving with mexiletine. Taking phenergan PRN. 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   She was seen in HTS clinic and reported 4 low flow alarms the last 4 nights.  She reports they occur in her sleep and last for only a few seconds.  By the time she wakes up; they quickly stop.  She does report to possibly being little volume overloaded.  She hasn't noticed weight gain at home but thinks she may have little extra fluid.  She denies SOB, chest pain, palpitations, LEGER. In review of her records: she was 223lbs on 10/24/19 during previous hospitalization and is 235lbs now.  Her lasix had previously been decreased to prn.     * Status post heart transplant  -Transplant Date 12/16/2019. HM 3 implanted 9/10/19 removed during transplant.    -HTS Primary.  -Transplanted by: Dr. Nuñez.  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  - weaned off. Continues Lasix PO 80 mg BID as of 12/26.    -Prednisone 10 mg BID, MMF 1000 BID (CMV high risk); Prograf 3/4. 2 Doses of Thymo given 12/18, 12/19 due to and creat of 3.3. On Bactrim and Valacyclovir   -No biopsy this week. Will plan for biopsy next week.   -Prophylaxis- Nystatin.  Valcyte to begin on 12/26   -Chest tubes removed. Pacer wires in place and will plan to DC today  -Possible DC tomorrow    -Right groin  discomfort this PM and will get an US      GLO (acute kidney injury)  - Baseline creat was 1.3-1.6  - Acute elevation post transplant  - Will avoid nephrotoxins and monitor       CKD (chronic kidney disease)  - Baseline creat appears 1.3-1.6.   - see above GLO          Dyllan Herzog MD  Heart Transplant  [facility]

## 2019-12-29 NOTE — NURSING
Page placed to CTS via .  Surgical on-call returned call, states they will pass message to CTS on-call to remove pacer wires as directed from HTS    1107: MD returned call & states understanding, will confer with Fellow

## 2019-12-29 NOTE — SUBJECTIVE & OBJECTIVE
Interval History: No issues overnight.  HR improved from 70s to mid 90s, asymptomatic and SBP is 120s. Has mild non-pitting edema of LE that she feels started since suing steroids.     Has an excellent UOP on PO Lasix 80 mg BID. Scr continues to improve now at 1.4.    Today's Plan:  -Continue with Theophylline 200 mg BID   -Tacrolimus is 11 and will continue dosing at 3/4  -Aim for first biopsy next week (Tuesday)  -Discharge tomorrow if HR better (at least at 90s bpm) and consider increasing Theophylline further as needed     Lines:   Midline: Right cephalic vein: 12/4/19  Pacer Wires: 12/16/19 (to be removed today or prior to DC)    Continuous Infusions:    Scheduled Meds:   aspirin  81 mg Oral Daily    atorvastatin  20 mg Oral Daily    famotidine  20 mg Oral Daily    ferrous gluconate  324 mg Oral TID WM    furosemide  80 mg Oral BID    gabapentin  100 mg Oral TID    magnesium oxide  400 mg Oral BID    mirtazapine  15 mg Oral QHS    mycophenolate  1,000 mg Oral BID    nystatin  500,000 Units Mouth/Throat QID (WM & HS)    polyethylene glycol  17 g Oral Daily    predniSONE  10 mg Oral BID    sulfamethoxazole-trimethoprim 400-80mg  1 tablet Oral Daily    tacrolimus  3 mg Oral Daily AM    tacrolimus  4 mg Oral Daily PM    theophylline  200 mg Oral BID    valGANciclovir  450 mg Oral Daily    venlafaxine  150 mg Oral Daily     PRN Meds:acetaminophen, Dextrose 10% Bolus, Dextrose 10% Bolus, glucagon (human recombinant), glucose, glucose, insulin aspart U-100, oxyCODONE, oxyCODONE, senna-docusate 8.6-50 mg, zolpidem    Review of patient's allergies indicates:   Allergen Reactions    Adhesive Blisters     Reaction to area in chest and up only    Codeine Itching     Objective:     Vital Signs (Most Recent):  Temp: (P) 98 °F (36.7 °C) (12/29/19 0826)  Pulse: 83 (12/29/19 0826)  Resp: 18 (12/29/19 0826)  BP: 127/70 (12/29/19 0826)  SpO2: 98 % (12/29/19 0826) Vital Signs (24h Range):  Temp:  [97.5 °F (36.4  °C)-98.3 °F (36.8 °C)] (P) 98 °F (36.7 °C)  Pulse:  [] 83  Resp:  [10-18] 18  SpO2:  [97 %-99 %] 98 %  BP: (108-127)/(55-71) 127/70     Patient Vitals for the past 72 hrs (Last 3 readings):   Weight   12/29/19 0522 105.6 kg (232 lb 12.9 oz)   12/28/19 0600 105.2 kg (231 lb 13 oz)   12/27/19 0645 105.1 kg (231 lb 11.3 oz)     Body mass index is 37.58 kg/m².      Intake/Output Summary (Last 24 hours) at 12/29/2019 1051  Last data filed at 12/29/2019 0300  Gross per 24 hour   Intake 1160 ml   Output 2600 ml   Net -1440 ml       Hemodynamic Parameters:       Telemetry: reviewed: sinus tach: rates in 90's    Physical Exam   Constitutional: She is oriented to person, place, and time. She appears well-developed and well-nourished.   HENT:   Head: Normocephalic.   Neck: Normal range of motion. JVD present.   Cardiovascular: Normal rate, regular rhythm and normal heart sounds.   No murmur heard.  Pulmonary/Chest: Effort normal and breath sounds normal.   Healing post surgical sternotomy wound    Abdominal: Soft. Bowel sounds are normal.   Musculoskeletal: Normal range of motion.   Neurological: She is alert and oriented to person, place, and time.   Skin: Skin is warm and dry. Capillary refill takes less than 2 seconds.   Constitutional: laying in bed NAD  Head: Normocephalic and atraumatic.   Eyes: Conjunctivae are normal.   Neck: Neck supple. Left IJ in place  Cardiovascular: Regular rate and rhythm; S1, S2 normal.  No rub, gallop or murmer  Pulmonary/Chest: Effort normal. Chest tubes intact. Serosanguinous drainage. Pacing wires in place.    Abdominal: Soft. There is no tenderness.   Musculoskeletal: She exhibits no deformity. Right femoral access site covered with dressing. No issues.    Neurological: She is alert.   Skin: Skin is warm and dry.   Psychiatric: Mood and affect appropriate    Nursing note and vitals reviewed.    Significant Labs:  CBC:  Recent Labs   Lab 12/27/19  0713 12/28/19  0630 12/29/19  0643    WBC 18.77* 14.04* 14.03*   RBC 3.11* 2.88* 3.07*   HGB 8.6* 7.9* 8.3*   HCT 28.1* 26.5* 28.4*    364* 368*   MCV 90 92 93   MCH 27.7 27.4 27.0   MCHC 30.6* 29.8* 29.2*     BNP:  Recent Labs   Lab 12/24/19  0655   *     CMP:  Recent Labs   Lab 12/26/19  0629  12/27/19  0713  12/28/19  0630 12/28/19  1505 12/29/19  0643   *   < > 144*   < > 179* 190* 126*   CALCIUM 8.5*   < > 8.6*   < > 8.7 9.0 8.9   ALBUMIN 2.8*  --  2.9*  --  2.8*  --   --    PROT 5.2*  --  5.5*  --  5.9*  --   --       < > 136   < > 138 141 140   K 4.3   < > 4.4   < > 4.6 3.9 4.1   CO2 29   < > 30*   < > 30* 28 29      < > 98   < > 99 101 99   BUN 43*   < > 40*   < > 37* 38* 31*   CREATININE 1.7*   < > 1.6*   < > 1.6* 1.6* 1.4   ALKPHOS 76  --  79  --  80  --   --    ALT 30  --  25  --  22  --   --    AST 24  --  15  --  18  --   --    BILITOT 0.6  --  0.8  --  0.5  --   --     < > = values in this interval not displayed.      Coagulation:   Recent Labs   Lab 12/23/19  0530   INR 1.2     LDH:  No results for input(s): LDH in the last 72 hours.  Microbiology:  Microbiology Results (last 7 days)     ** No results found for the last 168 hours. **        have reviewed all pertinent labs within the past 24 hours.    Estimated Creatinine Clearance: 59 mL/min (based on SCr of 1.4 mg/dL).    Diagnostic Results:  I have reviewed all pertinent imaging results/findings within the past 24 hours.

## 2019-12-30 ENCOUNTER — ANTI-COAG VISIT (OUTPATIENT)
Dept: CARDIOLOGY | Facility: CLINIC | Age: 49
End: 2019-12-30

## 2019-12-30 ENCOUNTER — PATIENT MESSAGE (OUTPATIENT)
Dept: ENDOCRINOLOGY | Facility: HOSPITAL | Age: 49
End: 2019-12-30

## 2019-12-30 VITALS
TEMPERATURE: 99 F | WEIGHT: 231.25 LBS | HEIGHT: 66 IN | SYSTOLIC BLOOD PRESSURE: 108 MMHG | OXYGEN SATURATION: 96 % | RESPIRATION RATE: 18 BRPM | DIASTOLIC BLOOD PRESSURE: 61 MMHG | HEART RATE: 98 BPM | BODY MASS INDEX: 37.16 KG/M2

## 2019-12-30 DIAGNOSIS — Z94.1 HEART REPLACED BY TRANSPLANT: Primary | ICD-10-CM

## 2019-12-30 DIAGNOSIS — E13.9 POST-TRANSPLANT DIABETES MELLITUS: Primary | ICD-10-CM

## 2019-12-30 DIAGNOSIS — Z94.1 HEART REPLACED BY TRANSPLANT: ICD-10-CM

## 2019-12-30 DIAGNOSIS — Z79.01 LONG TERM (CURRENT) USE OF ANTICOAGULANTS: ICD-10-CM

## 2019-12-30 LAB
ALBUMIN SERPL BCP-MCNC: 2.8 G/DL (ref 3.5–5.2)
ALP SERPL-CCNC: 94 U/L (ref 55–135)
ALT SERPL W/O P-5'-P-CCNC: 25 U/L (ref 10–44)
ANION GAP SERPL CALC-SCNC: 11 MMOL/L (ref 8–16)
AST SERPL-CCNC: 18 U/L (ref 10–40)
BASOPHILS # BLD AUTO: 0.01 K/UL (ref 0–0.2)
BASOPHILS NFR BLD: 0.1 % (ref 0–1.9)
BILIRUB DIRECT SERPL-MCNC: 0.3 MG/DL (ref 0.1–0.3)
BILIRUB SERPL-MCNC: 0.6 MG/DL (ref 0.1–1)
BNP SERPL-MCNC: 403 PG/ML (ref 0–99)
BUN SERPL-MCNC: 30 MG/DL (ref 6–20)
CALCIUM SERPL-MCNC: 9 MG/DL (ref 8.7–10.5)
CHLORIDE SERPL-SCNC: 97 MMOL/L (ref 95–110)
CO2 SERPL-SCNC: 29 MMOL/L (ref 23–29)
CREAT SERPL-MCNC: 1.4 MG/DL (ref 0.5–1.4)
DIFFERENTIAL METHOD: ABNORMAL
EOSINOPHIL # BLD AUTO: 0 K/UL (ref 0–0.5)
EOSINOPHIL NFR BLD: 0.2 % (ref 0–8)
ERYTHROCYTE [DISTWIDTH] IN BLOOD BY AUTOMATED COUNT: 16.9 % (ref 11.5–14.5)
EST. GFR  (AFRICAN AMERICAN): 50.5 ML/MIN/1.73 M^2
EST. GFR  (NON AFRICAN AMERICAN): 43.8 ML/MIN/1.73 M^2
GLUCOSE SERPL-MCNC: 168 MG/DL (ref 70–110)
HCT VFR BLD AUTO: 28.5 % (ref 37–48.5)
HGB BLD-MCNC: 8.3 G/DL (ref 12–16)
IMM GRANULOCYTES # BLD AUTO: 0.11 K/UL (ref 0–0.04)
IMM GRANULOCYTES NFR BLD AUTO: 0.9 % (ref 0–0.5)
LYMPHOCYTES # BLD AUTO: 0.1 K/UL (ref 1–4.8)
LYMPHOCYTES NFR BLD: 0.6 % (ref 18–48)
MAGNESIUM SERPL-MCNC: 1.5 MG/DL (ref 1.6–2.6)
MCH RBC QN AUTO: 26.9 PG (ref 27–31)
MCHC RBC AUTO-ENTMCNC: 29.1 G/DL (ref 32–36)
MCV RBC AUTO: 93 FL (ref 82–98)
MONOCYTES # BLD AUTO: 0.6 K/UL (ref 0.3–1)
MONOCYTES NFR BLD: 4.8 % (ref 4–15)
NEUTROPHILS # BLD AUTO: 11.9 K/UL (ref 1.8–7.7)
NEUTROPHILS NFR BLD: 93.4 % (ref 38–73)
NRBC BLD-RTO: 0 /100 WBC
PLATELET # BLD AUTO: 381 K/UL (ref 150–350)
PMV BLD AUTO: 9.7 FL (ref 9.2–12.9)
POCT GLUCOSE: 134 MG/DL (ref 70–110)
POCT GLUCOSE: 153 MG/DL (ref 70–110)
POCT GLUCOSE: 175 MG/DL (ref 70–110)
POTASSIUM SERPL-SCNC: 3.9 MMOL/L (ref 3.5–5.1)
PROT SERPL-MCNC: 6.1 G/DL (ref 6–8.4)
RBC # BLD AUTO: 3.08 M/UL (ref 4–5.4)
SODIUM SERPL-SCNC: 137 MMOL/L (ref 136–145)
TACROLIMUS BLD-MCNC: 10.2 NG/ML (ref 5–15)
WBC # BLD AUTO: 12.69 K/UL (ref 3.9–12.7)

## 2019-12-30 PROCEDURE — 80076 HEPATIC FUNCTION PANEL: CPT

## 2019-12-30 PROCEDURE — 63600175 PHARM REV CODE 636 W HCPCS: Performed by: INTERNAL MEDICINE

## 2019-12-30 PROCEDURE — 25000003 PHARM REV CODE 250: Performed by: INTERNAL MEDICINE

## 2019-12-30 PROCEDURE — 83735 ASSAY OF MAGNESIUM: CPT

## 2019-12-30 PROCEDURE — 97803 MED NUTRITION INDIV SUBSEQ: CPT

## 2019-12-30 PROCEDURE — 25000003 PHARM REV CODE 250: Performed by: PHYSICIAN ASSISTANT

## 2019-12-30 PROCEDURE — 36415 COLL VENOUS BLD VENIPUNCTURE: CPT

## 2019-12-30 PROCEDURE — 97110 THERAPEUTIC EXERCISES: CPT

## 2019-12-30 PROCEDURE — 25000003 PHARM REV CODE 250: Performed by: NURSE PRACTITIONER

## 2019-12-30 PROCEDURE — 97116 GAIT TRAINING THERAPY: CPT

## 2019-12-30 PROCEDURE — 97530 THERAPEUTIC ACTIVITIES: CPT

## 2019-12-30 PROCEDURE — 80048 BASIC METABOLIC PNL TOTAL CA: CPT

## 2019-12-30 PROCEDURE — 99232 PR SUBSEQUENT HOSPITAL CARE,LEVL II: ICD-10-PCS | Mod: ,,, | Performed by: NURSE PRACTITIONER

## 2019-12-30 PROCEDURE — 99233 PR SUBSEQUENT HOSPITAL CARE,LEVL III: ICD-10-PCS | Mod: ,,, | Performed by: INTERNAL MEDICINE

## 2019-12-30 PROCEDURE — 63600175 PHARM REV CODE 636 W HCPCS: Performed by: PHYSICIAN ASSISTANT

## 2019-12-30 PROCEDURE — 99233 SBSQ HOSP IP/OBS HIGH 50: CPT | Mod: ,,, | Performed by: INTERNAL MEDICINE

## 2019-12-30 PROCEDURE — 99232 SBSQ HOSP IP/OBS MODERATE 35: CPT | Mod: ,,, | Performed by: NURSE PRACTITIONER

## 2019-12-30 PROCEDURE — 85025 COMPLETE CBC W/AUTO DIFF WBC: CPT

## 2019-12-30 PROCEDURE — 63600175 PHARM REV CODE 636 W HCPCS: Performed by: NURSE PRACTITIONER

## 2019-12-30 PROCEDURE — 80197 ASSAY OF TACROLIMUS: CPT

## 2019-12-30 PROCEDURE — 83880 ASSAY OF NATRIURETIC PEPTIDE: CPT

## 2019-12-30 RX ORDER — DEXTROSE 4 G
TABLET,CHEWABLE ORAL
Qty: 1 EACH | Refills: 0 | Status: ON HOLD | OUTPATIENT
Start: 2019-12-30 | End: 2020-01-28

## 2019-12-30 RX ORDER — VENLAFAXINE HYDROCHLORIDE 150 MG/1
150 CAPSULE, EXTENDED RELEASE ORAL DAILY
Qty: 30 CAPSULE | Refills: 11 | Status: SHIPPED | OUTPATIENT
Start: 2019-12-31 | End: 2023-06-20

## 2019-12-30 RX ORDER — LANCETS
EACH MISCELLANEOUS 2 TIMES DAILY
Qty: 100 EACH | Refills: 2 | Status: ON HOLD | OUTPATIENT
Start: 2019-12-30 | End: 2020-01-28

## 2019-12-30 RX ORDER — LANOLIN ALCOHOL/MO/W.PET/CERES
400 CREAM (GRAM) TOPICAL DAILY
Qty: 30 TABLET | Refills: 11 | Status: SHIPPED | OUTPATIENT
Start: 2019-12-30 | End: 2021-01-19

## 2019-12-30 RX ORDER — VALGANCICLOVIR 450 MG/1
900 TABLET, FILM COATED ORAL DAILY
Qty: 60 TABLET | Refills: 5 | Status: SHIPPED | OUTPATIENT
Start: 2019-12-30 | End: 2019-12-30

## 2019-12-30 RX ORDER — ATORVASTATIN CALCIUM 20 MG/1
20 TABLET, FILM COATED ORAL DAILY
Qty: 90 TABLET | Refills: 3 | Status: SHIPPED | OUTPATIENT
Start: 2019-12-31 | End: 2021-01-19

## 2019-12-30 RX ORDER — TACROLIMUS 1 MG/1
CAPSULE ORAL
Qty: 210 CAPSULE | Refills: 11 | Status: ON HOLD | OUTPATIENT
Start: 2019-12-30 | End: 2020-01-10 | Stop reason: SDUPTHER

## 2019-12-30 RX ORDER — ASPIRIN 81 MG/1
81 TABLET ORAL DAILY
Qty: 30 TABLET | Refills: 11 | Status: SHIPPED | OUTPATIENT
Start: 2019-12-31

## 2019-12-30 RX ORDER — VALGANCICLOVIR 450 MG/1
450 TABLET, FILM COATED ORAL DAILY
Qty: 30 TABLET | Refills: 5 | Status: ON HOLD | OUTPATIENT
Start: 2019-12-30 | End: 2020-01-10 | Stop reason: HOSPADM

## 2019-12-30 RX ORDER — PSYLLIUM HUSK 0.4 G
1 CAPSULE ORAL DAILY
Qty: 30 TABLET | Refills: 11 | Status: SHIPPED | OUTPATIENT
Start: 2019-12-30 | End: 2021-01-19

## 2019-12-30 RX ORDER — OXYCODONE HYDROCHLORIDE 10 MG/1
10 TABLET ORAL EVERY 8 HOURS PRN
Qty: 21 TABLET | Refills: 0 | Status: SHIPPED | OUTPATIENT
Start: 2019-12-30 | End: 2020-07-01

## 2019-12-30 RX ORDER — ZOLPIDEM TARTRATE 5 MG/1
5 TABLET ORAL NIGHTLY PRN
Qty: 30 TABLET | Refills: 11 | Status: CANCELLED | OUTPATIENT
Start: 2019-12-30

## 2019-12-30 RX ORDER — GABAPENTIN 300 MG/1
300 CAPSULE ORAL 3 TIMES DAILY
Qty: 90 CAPSULE | Refills: 11 | Status: SHIPPED | OUTPATIENT
Start: 2019-12-30 | End: 2019-12-30

## 2019-12-30 RX ORDER — FUROSEMIDE 80 MG/1
80 TABLET ORAL 2 TIMES DAILY
Qty: 60 TABLET | Refills: 11 | Status: ON HOLD | OUTPATIENT
Start: 2019-12-30 | End: 2020-01-10 | Stop reason: SDUPTHER

## 2019-12-30 RX ORDER — FERROUS SULFATE 325(65) MG
325 TABLET ORAL 2 TIMES DAILY
Qty: 60 TABLET | Refills: 2 | Status: SHIPPED | OUTPATIENT
Start: 2019-12-30 | End: 2020-03-10 | Stop reason: CLARIF

## 2019-12-30 RX ORDER — PREDNISONE 5 MG/1
20 TABLET ORAL DAILY
Qty: 120 TABLET | Refills: 2 | Status: ON HOLD | OUTPATIENT
Start: 2019-12-30 | End: 2020-01-10 | Stop reason: SDUPTHER

## 2019-12-30 RX ORDER — GABAPENTIN 300 MG/1
300 CAPSULE ORAL NIGHTLY
Qty: 30 CAPSULE | Refills: 11 | Status: SHIPPED | OUTPATIENT
Start: 2019-12-30 | End: 2021-01-19

## 2019-12-30 RX ADMIN — NYSTATIN 500000 UNITS: 100000 SUSPENSION ORAL at 04:12

## 2019-12-30 RX ADMIN — VALGANCICLOVIR 450 MG: 450 TABLET, FILM COATED ORAL at 08:12

## 2019-12-30 RX ADMIN — OXYCODONE HYDROCHLORIDE 10 MG: 10 TABLET ORAL at 08:12

## 2019-12-30 RX ADMIN — FUROSEMIDE 80 MG: 40 TABLET ORAL at 08:12

## 2019-12-30 RX ADMIN — POLYETHYLENE GLYCOL 3350 17 G: 17 POWDER, FOR SOLUTION ORAL at 08:12

## 2019-12-30 RX ADMIN — TACROLIMUS 3 MG: 1 CAPSULE ORAL at 08:12

## 2019-12-30 RX ADMIN — FERROUS GLUCONATE TAB 324 MG (37.5 MG ELEMENTAL IRON) 324 MG: 324 (37.5 FE) TAB at 04:12

## 2019-12-30 RX ADMIN — ASPIRIN 81 MG: 81 TABLET, COATED ORAL at 08:12

## 2019-12-30 RX ADMIN — FAMOTIDINE 20 MG: 20 TABLET ORAL at 08:12

## 2019-12-30 RX ADMIN — MYCOPHENOLATE MOFETIL 1000 MG: 250 CAPSULE ORAL at 08:12

## 2019-12-30 RX ADMIN — Medication 400 MG: at 08:12

## 2019-12-30 RX ADMIN — OXYCODONE HYDROCHLORIDE 10 MG: 10 TABLET ORAL at 12:12

## 2019-12-30 RX ADMIN — THEOPHYLLINE ANHYDROUS 200 MG: 100 CAPSULE, EXTENDED RELEASE ORAL at 08:12

## 2019-12-30 RX ADMIN — GABAPENTIN 100 MG: 100 CAPSULE ORAL at 08:12

## 2019-12-30 RX ADMIN — OXYCODONE HYDROCHLORIDE 10 MG: 10 TABLET ORAL at 04:12

## 2019-12-30 RX ADMIN — GABAPENTIN 100 MG: 100 CAPSULE ORAL at 02:12

## 2019-12-30 RX ADMIN — ATORVASTATIN CALCIUM 20 MG: 20 TABLET, FILM COATED ORAL at 08:12

## 2019-12-30 RX ADMIN — SULFAMETHOXAZOLE AND TRIMETHOPRIM 1 TABLET: 400; 80 TABLET ORAL at 08:12

## 2019-12-30 RX ADMIN — SITAGLIPTIN 50 MG: 50 TABLET, FILM COATED ORAL at 12:12

## 2019-12-30 RX ADMIN — PREDNISONE 10 MG: 10 TABLET ORAL at 08:12

## 2019-12-30 RX ADMIN — NYSTATIN 500000 UNITS: 100000 SUSPENSION ORAL at 12:12

## 2019-12-30 RX ADMIN — FERROUS GLUCONATE TAB 324 MG (37.5 MG ELEMENTAL IRON) 324 MG: 324 (37.5 FE) TAB at 12:12

## 2019-12-30 RX ADMIN — VENLAFAXINE HYDROCHLORIDE 150 MG: 37.5 CAPSULE, EXTENDED RELEASE ORAL at 08:12

## 2019-12-30 RX ADMIN — NYSTATIN 500000 UNITS: 100000 SUSPENSION ORAL at 08:12

## 2019-12-30 RX ADMIN — OXYCODONE HYDROCHLORIDE 10 MG: 10 TABLET ORAL at 03:12

## 2019-12-30 RX ADMIN — FERROUS GLUCONATE TAB 324 MG (37.5 MG ELEMENTAL IRON) 324 MG: 324 (37.5 FE) TAB at 08:12

## 2019-12-30 NOTE — DISCHARGE SUMMARY
[facility] [address]  Heart Transplant  Discharge Summary      Patient Name: Deborah Navas  MRN: [id number]  Admission Date: 11/13/2019  Hospital Length of Stay: 45 days  Discharge Date and Time: 12/30/2019 12:44 PM  Attending Physician: Eric Antunez Jr.,*   Discharging Provider: Julieth Ramos PA-C  Primary Care Provider: Primary Doctor No     HPI:  48 yo WF with NICM, s/p HM3 implantation 9/10/19 (post up coarse uncomplicated with the exception of+ diaphragmatic stimulation of LV lead and pleural effusion with chest tube placement, atrial flutter with NADINE/DCCV), V-fib reported in setting of hypokalemia with appropriate shock from ICD on Amiodarone prior to LVAD placement; and recent hospitalizations for ventricular arrythmias; started on Mexilitine.  She was seen in EP clinic today and she continues to have multiple VT episodes with some ATP therapy, although no ICD shocks since starting mexiletine 10/24/2019. One slow VT episode 2.5hrs 11/3/2019. Patient asymptomatic with LVAD. On coumadin. No AFL since post-op event (paced out of rhythm 9/24/2019). CHB with 100% V pacing (LV lead off). She does not feel well. Dry heaving with mexiletine. Taking phenergan PRN. 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   She was seen in HTS clinic and reported 4 low flow alarms the last 4 nights.  She reports they occur in her sleep and last for only a few seconds.  By the time she wakes up; they quickly stop.  She does report to possibly being little volume overloaded.  She hasn't noticed weight gain at home but thinks she may have little extra fluid.  She denies SOB, chest pain, palpitations, LEGER. In review of her records: she was 223lbs on 10/24/19 during previous hospitalization and is 235lbs now.  Her lasix had previously been decreased to prn.     Procedure(s) (LRB):  TRANSPLANT, HEART (N/A)     Hospital Course:  Patient was admitted for work up of low flow  alarms and ADHF. She was moved to ICU after repeated runs of VT requiring ATP for which she was shocked x 3 thereafter. She was reloaded on amio and lido. She was also started on EPI and NO for RV failure. After multiple attempts to wean NO and EPI, patient was discussed at selection for listing for OHTx with exception on 11/26 (RV failure, cannot tolerate  due to VT). She was transplanted on 12/16/2019 with a relatively uncomplicated post op course. cPRA current / peak within one year: 0/0; weak HLA B46(1535) HLA antigen mismatch: 4/6, Crossmatch: VXM negative.  She completed two doses of renal sparing thymo. Theophylline was added for HR, and HR on discharge was 80-90s. Weekend prior to discharge she c/o some right groin discomfort near cannulation site. U/S RLE was read with heterogenous fluid collection is right groin soft tissue hematoma vs seroma. CTS reviewed u/s and examined patient and were ok with d/c and following up/watch and wait approach. Old DLES site healed well without complication. She was discharged to [facility] with home health PT/OT in stable condition with plans for Biopsy #1 on Tuesday 12/31. See below for complete med rec.     Consults (From admission, onward)        Status Ordering Provider     Consult to Endocrinology  Once     Provider:  (Not yet assigned)    Completed LARRY BISWAS     Inpatient consult to Electrophysiology  Once     Provider:  (Not yet assigned)    Completed CASEY AMBROCIO     Inpatient consult to Electrophysiology  Once     Provider:  (Not yet assigned)    Completed SARAH BETH HERNÁNDEZ     Inpatient consult to Gynecology  Once     Provider:  (Not yet assigned)    Completed CASEY AMBROCIO     Inpatient consult to Infectious Diseases  Once     Provider:  (Not yet assigned)    Completed NELY LAGUNA     Inpatient consult to Kidney/Pancreas Transplant Medicine  Once     Provider:  (Not yet assigned)    CASEY Burgess      Inpatient consult to Midline team  Once     Provider:  (Not yet assigned)    Completed NELY LAGUNA     Inpatient consult to Midline team  Once     Provider:  (Not yet assigned)    Completed NELY LAGUNA     Inpatient consult to Midline team  Once     Provider:  (Not yet assigned)    Completed CASEY AMBROCIO     Inpatient consult to Registered Dietitian/Nutritionist  Once     Provider:  (Not yet assigned)    Completed LARRY BISWAS        Significant Diagnostic Studies: Cardiac Graphics: Echocardiogram:   2D echo with color flow doppler: No results found for this or any previous visit. and Transthoracic echo (TTE) complete (Cupid Only):   Results for orders placed or performed during the hospital encounter of 11/13/19   Echo Color Flow Doppler? Yes   Result Value Ref Range    Ascending aorta 2.12 cm    STJ 2.40 cm    AV mean gradient 17 mmHg    Ao peak scott 2.93 m/s    Ao VTI 36.49 cm    IVRT 0.07 msec    IVS 0.84 0.6 - 1.1 cm    LVIDD 4.27 3.5 - 6.0 cm    LVIDS 2.64 2.1 - 4.0 cm    LVOT diameter 2.06 cm    LVOT peak VTI 24.74 cm    PW 0.81 0.6 - 1.1 cm    RVDD 2.82 cm    Sinus 2.66 cm    TAPSE 1.07 cm    TR Max Scott 2.27 m/s    TDI LATERAL 0.11 m/s    TDI SEPTAL 0.11 m/s    LV Diastolic Volume 81.67 mL    LV Systolic Volume 25.58 mL    RV S' 9.35 cm/s    LVOT peak scott 1.46 m/s    FS 38 %    LV mass 108.44 g    Left Ventricle Relative Wall Thickness 0.38 cm    AV valve area 2.26 cm2    AV Velocity Ratio 0.50     AV index (prosthetic) 0.68     Mean e' 0.11 m/s    LVOT area 3.3 cm2    LVOT stroke volume 82.41 cm3    AV peak gradient 34 mmHg    LV Systolic Volume Index 12.1 mL/m2    LV Diastolic Volume Index 38.48 mL/m2    LV Mass Index 51 g/m2    Triscuspid Valve Regurgitation Peak Gradient 21 mmHg    BSA 2.2 m2    Narrative    · S/p orthotopic heart transplant  · Increased (hyperdynamic) left ventricular systolic function. The   estimated ejection fraction is 75%  · Septal wall has abnormal  motion.  · Normal LV diastolic function.  · Normal right ventricular systolic function.  · Mild tricuspid regurgitation.          Pending Diagnostic Studies:     Procedure Component Value Units Date/Time    Specimen to Pathology, Surgery Cardiovascular [[id number]] Collected:  12/16/19 1109    Order Status:  Sent Lab Status:  In process Updated:  12/16/19 1406        Final Active Diagnoses:    Diagnosis Date Noted POA    PRINCIPAL PROBLEM:  Status post heart transplant [Z94.1] 12/16/2019 Not Applicable    Left ventricular assist device complication [T82.9XXA]  Yes    Heart replaced by transplant [Z94.1] 12/20/2019 Not Applicable    Adverse effect of adrenal cortical steroids, sequela [T38.0X5S] 12/16/2019 Not Applicable    Stress hyperglycemia [R73.9] 12/16/2019 Unknown    Immunosuppression [D89.9]  Unknown    Kidney transplant candidate [Z76.82] 12/11/2019 Not Applicable    GLO (acute kidney injury) [N17.9]  Yes    RVF (right ventricular failure) [I50.810]  Yes    CKD (chronic kidney disease) [N18.9] 09/07/2019 Yes    Heart transplant candidate [Z76.82] 09/04/2019 Not Applicable      Problems Resolved During this Admission:    Diagnosis Date Noted Date Resolved POA    Nausea [R11.0] 12/09/2019 12/23/2019 Yes    Organ transplant candidate [Z76.82]  12/19/2019 Not Applicable    Ventricular tachycardia [I47.2] 10/20/2019 12/23/2019 Yes    Acute on chronic combined systolic and diastolic heart failure [I50.43] 09/04/2019 12/19/2019 Yes    ICD (implantable cardioverter-defibrillator) in place [Z95.810] 07/20/2018 12/19/2019 Yes      Discharged Condition: stable    Disposition: Home-Health Care Svc    Follow Up: See below    Patient Instructions:      Diet Cardiac     Notify your health care provider if you experience any of the following:  increased confusion or weakness     Notify your health care provider if you experience any of the following:  persistent dizziness, light-headedness, or visual  disturbances     Notify your health care provider if you experience any of the following:  worsening rash     Notify your health care provider if you experience any of the following:  severe persistent headache     Notify your health care provider if you experience any of the following:  difficulty breathing or increased cough     Notify your health care provider if you experience any of the following:  redness, tenderness, or signs of infection (pain, swelling, redness, odor or green/yellow discharge around incision site)     Notify your health care provider if you experience any of the following:  severe uncontrolled pain     Notify your health care provider if you experience any of the following:  persistent nausea and vomiting or diarrhea     Notify your health care provider if you experience any of the following:  temperature >100.4     Activity as tolerated   Order Comments: Sternal precautions     Medications:  Reconciled Home Medications:      Medication List      START taking these medications    atorvastatin 20 MG tablet  Commonly known as:  LIPITOR  Take 1 tablet (20 mg total) by mouth once daily.  Start taking on:  December 31, 2019     calcium carbonate-vitamin D3 1,000 mg(2,500 mg)-800 unit Tab  Take 1 tablet by mouth once daily.     ferrous sulfate 325 mg (65 mg iron) Tab tablet  Commonly known as:  FEOSOL  Take 1 tablet (325 mg total) by mouth 2 (two) times daily.     mycophenolate 250 mg Cap  Commonly known as:  CELLCEPT  Take 4 capsules (1,000 mg total) by mouth 2 (two) times daily.     nystatin 100,000 unit/mL suspension  Commonly known as:  MYCOSTATIN  Take 5 mLs (500,000 Units total) by mouth 4 (four) times daily with meals and nightly.     oxyCODONE 10 mg Tab immediate release tablet  Commonly known as:  ROXICODONE  Take 1 tablet (10 mg total) by mouth every 8 (eight) hours as needed.     predniSONE 5 MG tablet  Commonly known as:  DELTASONE  Take 4 tablets (20 mg total) by mouth once daily.      ranitidine 150 MG tablet  Commonly known as:  ZANTAC  Take 1 tablet (150 mg total) by mouth nightly.     sulfamethoxazole-trimethoprim 400-80mg 400-80 mg per tablet  Commonly known as:  BACTRIM,SEPTRA  Take 1 tablet by mouth once daily.     tacrolimus 1 MG Cap  Commonly known as:  PROGRAF  Take 3 capsules (3 mg total) by mouth every morning AND 4 capsules (4 mg total) every evening.     theophylline 400 mg 24 hr capsule  Commonly known as:  CRYSTAL-24  Take 400 mg by mouth once daily.     valGANciclovir 450 mg Tab  Commonly known as:  VALCYTE  Take 1 tablet (450 mg total) by mouth once daily.     venlafaxine 150 MG Cp24  Commonly known as:  EFFEXOR-XR  Take 1 capsule (150 mg total) by mouth once daily.  Start taking on:  December 31, 2019  Replaces:  EFFEXOR ORAL        CHANGE how you take these medications    aspirin 81 MG EC tablet  Commonly known as:  ECOTRIN  Take 1 tablet (81 mg total) by mouth once daily.  Start taking on:  December 31, 2019  What changed:    · medication strength  · how much to take     furosemide 80 MG tablet  Commonly known as:  LASIX  Take 1 tablet (80 mg total) by mouth 2 (two) times daily.  What changed:    · medication strength  · how much to take  · how to take this  · when to take this  · additional instructions     gabapentin 300 MG capsule  Commonly known as:  NEURONTIN  Take 1 capsule (300 mg total) by mouth every evening.  What changed:    · medication strength  · how much to take  · when to take this        CONTINUE taking these medications    magnesium oxide 400 mg (241.3 mg magnesium) tablet  Commonly known as:  MAG-OX  Take 1 tablet (400 mg total) by mouth once daily.     mirtazapine 15 MG tablet  Commonly known as:  REMERON  Take 1 tablet (15 mg total) by mouth every evening.     senna-docusate 8.6-50 mg 8.6-50 mg per tablet  Commonly known as:  PERICOLACE  Take 2 tablets by mouth 2 (two) times daily as needed for Constipation.     zolpidem 5 MG Tab  Commonly known as:   AMBIEN  Take 1 tablet (5 mg total) by mouth nightly as needed for insomnia.        STOP taking these medications    ALPRAZolam 0.25 MG tablet  Commonly known as:  XANAX     amiodarone 200 MG Tab  Commonly known as:  PACERONE     EFFEXOR ORAL  Replaced by:  venlafaxine 150 MG Cp24     Fluzone Quad [id number] (PF) 60 mcg (15 mcg x 4)/0.5 mL Syrg  Generic drug:  flu vacc [id number] 6mos up(PF)     lisinopril 10 MG tablet     mexiletine 150 MG Cap  Commonly known as:  MEXITIL     oxyCODONE-acetaminophen 5-325 mg per tablet  Commonly known as:  PERCOCET     pantoprazole 40 MG tablet  Commonly known as:  PROTONIX     warfarin 3 MG tablet  Commonly known as:  COUMADIN            Julieth Ramos PA-C  Heart Transplant  [facility]    Return instructions as set forth by post transplant schedule or as needed:     Clinic: Return for labs and/or biopsy weekly the first month, every two weeks during month 2 and then monthly for the first year at the provider or coordinator's discretion. During the second year, return to clinic every 3 months. Post transplant year 3-5 return every 6 months. There will be a comprehensive post transplant evaluation every year that may include LHC/RHC/biopsy, stress test, echo, CXR, and other health screening exams.     In addition to the clinical assessment, I have ordered Allomap testing for this patient to assist in identification of moderate/severe acute cellular rejection (ACR) in a pt with stable Allograft function instead of endomyocardial biopsy.     Patient is reminded to call with any health changes as these can be early signs of transplant complications. Patient is advised to make sure any new medications or changes of old medications are discussed with a pharmacist or physician knowledgeable with transplant to avoid rejection/drug toxicity related to significant drug interactions.

## 2019-12-30 NOTE — ASSESSMENT & PLAN NOTE
BG goal 140 - 180       BG Monitoring AC/HS   Low Dose SQ Insulin Correction Scale PRN BG excursions.   Start Januvia 50 mg daily.       Discharge planning:   - Januvia 50 mg daily. Patient will remain on steroid therapy.   - Insurance preferred diabetes testing supplies  - BG logs provided to patient at bedside.   - After discussing evaluation results, patient agrees with proposed plan of care, has no further concerns, and agrees to follow-up with [facility] by sending pt logs via patient portal and/or mail.

## 2019-12-30 NOTE — PT/OT/SLP PROGRESS
Occupational Therapy   Treatment    Name: Dbeorah Navas  MRN: [id number]  Admitting Diagnosis:  Status post heart transplant  14 Days Post-Op    Recommendations:     Discharge Recommendations: home health OT  Discharge Equipment Recommendations:  shower chair  Barriers to discharge:  None    Assessment:     Deborah Navas is a 49 y.o. female with a medical diagnosis of Status post heart transplant. Progressing well - possible D/C today to [place]. Performance deficits affecting function are weakness, impaired self care skills, impaired endurance, impaired functional mobilty, impaired cardiopulmonary response to activity, gait instability, impaired balance, decreased ROM, decreased coordination.     Rehab Prognosis:  Good; patient would benefit from acute skilled OT services to address these deficits and reach maximum level of function.       Plan:     Patient to be seen 3 x/week to address the above listed problems via self-care/home management, therapeutic activities, therapeutic exercises  · Plan of Care Expires: 01/18/20  · Plan of Care Reviewed with: patient    Subjective     Pain/Comfort:  · Pain Rating 1: 0/10    Objective:     Communicated with: rn prior to session.  Patient found supine with telemetry upon OT entry to room.    General Precautions: Standard, sternal, fall   Orthopedic Precautions:N/A   Braces:       Occupational Performance:     Bed Mobility:    · Independent    Functional Mobility/Transfers:  · Patient completed Sit <> Stand Transfer with supervision  with  no assistive device   · Functional Mobility: Pt walked ~ 200' x 2 with SBA/no AD and standing rest break due to fatigue/SOB.    Activities of Daily Living:  · Declined - already completed independently.    [facility] 6 Click ADL: 22    Treatment & Education:  Discussed any D/C needs and concerns in anticipation of leaving today - nothing expressed and confident in D/C plan.    Patient left supine with call button in  reachEducation:      GOALS:   Multidisciplinary Problems     Occupational Therapy Goals        Problem: Occupational Therapy Goal    Goal Priority Disciplines Outcome Interventions   Occupational Therapy Goal     OT, PT/OT Ongoing, Progressing    Description:  Goals to be met by: 1/2/19     Patient will increase functional independence with ADLs by performing:    UE Dressing with Modified [place].  LE Dressing with Modified [place].  Grooming while standing at sink with Modified [place] .  Toileting from toilet with Modified [place] for hygiene and clothing management.   Toilet transfers to toilet with Modified [place]                       Time Tracking:     OT Date of Treatment: 12/30/19  OT Start Time: 0826  OT Stop Time: 0843  OT Total Time (min): 17 min    Billable Minutes:Therapeutic Activity 17    JAGJIT Jensen  12/30/2019

## 2019-12-30 NOTE — SUBJECTIVE & OBJECTIVE
"Interval HPI:   Overnight events:  BG is reasonably controlled on current PRN SQ insulin regimen. However, patient is scheduled for discharge today.     Eatin%  Nausea: No  Hypoglycemia and intervention: No  Fever: No  TPN and/or TF: No  If yes, type of TF/TPN and rate: None    /75   Pulse 87   Temp 98.5 °F (36.9 °C) (Oral)   Resp 15   Ht 5' 6" (1.676 m)   Wt 104.9 kg (231 lb 4.2 oz)   LMP  (LMP Unknown)   SpO2 (!) 94%   Breastfeeding? No   BMI 37.33 kg/m²     Labs Reviewed and Include    Recent Labs   Lab 19  0633   *   CALCIUM 9.0   ALBUMIN 2.8*   PROT 6.1      K 3.9   CO2 29   CL 97   BUN 30*   CREATININE 1.4   ALKPHOS 94   ALT 25   AST 18   BILITOT 0.6     Lab Results   Component Value Date    WBC 12.69 2019    HGB 8.3 (L) 2019    HCT 28.5 (L) 2019    MCV 93 2019     (H) 2019     No results for input(s): TSH, FREET4 in the last 168 hours.  Lab Results   Component Value Date    HGBA1C 6.5 (H) 2019       Nutritional status:   Body mass index is 37.33 kg/m².  Lab Results   Component Value Date    ALBUMIN 2.8 (L) 2019    ALBUMIN 2.8 (L) 2019    ALBUMIN 2.9 (L) 2019     Lab Results   Component Value Date    PREALBUMIN 27 2019    PREALBUMIN 28 2019    PREALBUMIN 29 2019       Estimated Creatinine Clearance: 58.8 mL/min (based on SCr of 1.4 mg/dL).    Accu-Checks  Recent Labs     19  2224 19  0811 19  1303 19  1809 19  2222 19  0759 19  1338 19  1822 19  2224 19  0800   POCTGLUCOSE 203* 138* 201* 206* 162* 126* 162* 218* 153* 134*       Current Medications and/or Treatments Impacting Glycemic Control  Immunotherapy:    Immunosuppressants         Stop Route Frequency     tacrolimus capsule 3 mg      -- Oral Daily     tacrolimus capsule 4 mg      -- Oral Daily     mycophenolate capsule 1,000 mg      -- Oral 2 times daily        Steroids: "   Hormones (From admission, onward)    Start     Stop Route Frequency Ordered    12/27/19 2100  predniSONE tablet 10 mg      -- Oral 2 times daily 12/23/19 1029        Pressors:    Autonomic Drugs (From admission, onward)    None        Hyperglycemia/Diabetes Medications:   Antihyperglycemics (From admission, onward)    Start     Stop Route Frequency Ordered    12/30/19 1145  SITagliptin tablet 50 mg      -- Oral Daily 12/30/19 1141    12/18/19 1700  insulin aspart U-100 pen 0-5 Units      -- SubQ Before meals & nightly PRN 12/18/19 1600

## 2019-12-30 NOTE — ASSESSMENT & PLAN NOTE
-Transplant Date 12/16/2019. HM 3 implanted 9/10/19 removed during transplant.    -HTS Primary.  -Transplanted by: Dr. Nuñez.  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  - weaned off. Continues Lasix PO 80 mg BID as of 12/26.  Tolerating well. Euvolemic on day of discharge.   -Prednisone 10 mg BID, MMF 1000 BID (CMV high risk); Prograf 3/4. 2 Doses of Thymo given 12/18, 12/19 due to and creat of 3.3. On Bactrim and Valacyclovir   -No biopsy this week. Will plan for biopsy #1 12/31/19.   -Prophylaxis- Nystatin, Valcyte   -theophylline added for HR, improved to 80-90's  -Chest tubes removed. Pacer wires to be removed today by CTS prior to discharge.  -Right groin discomfort: ultra-sounded groin and fluid collection noted. CTS to review prior to patients discharge.  -Possible DC today

## 2019-12-30 NOTE — PROGRESS NOTES
"[facility]  Adult Nutrition  Progress Note    SUMMARY       Recommendations  1.) Continue cardiac diet, fluid per MD.   2.) If PO intakes <50% of meals, suggest Boost Plus.   3.) Suggest MVI.   4.) Daily weights    Goals: 1.) Pt to consume/tolerate >75% EEN and EPN by follow up  Nutrition Goal Status: progressing towards goal  Communication of RD Recs: (POC)    Reason for Assessment    Reason For Assessment: RD follow-up  Diagnosis: surgery/postoperative complications(s/p OHTx 12/16)  Relevant Medical History: NICM s/p LVAD 9/2019, HLD  Interdisciplinary Rounds: did not attend  General Information Comments: Pt sitting up in bed reports "today is not a good day." Pt reports poor appetite 2/2 to no appetite. She reports appetite has been fair since OHTx, however she has unable to have BM x 4 days. Pt received senna-docusate + miralax to aid in relief. She reports some nausea. No other GI distress. NFPE completed 12/17 and continues to appear well nourished with mild edema. At this time, pt does not meet malnutrition criteria, but remains at risk given PO intake and edema. Will monitor.   Nutrition Discharge Planning: Post transplant nutrition education provided 12/23/19. Food safety/drug interactions emphasized. General healthy/low salt diet recommended. RD name/contact information, education material left. No other needs identified.    Nutrition Risk Screen    Nutrition Risk Screen: no indicators present    Nutrition/Diet History    Patient Reported Diet/Restrictions/Preferences: low salt  Spiritual, Cultural Beliefs, Taoism Practices, Values that Affect Care: no  Factors Affecting Nutritional Intake: None identified at this time    Anthropometrics    Temp: 97.9 °F (36.6 °C)  Height Method: Stated  Height: 5' 6" (167.6 cm)  Height (inches): 66 in  Weight Method: Standard Scale  Weight: 104.9 kg (231 lb 4.2 oz)  Weight (lb): 231.26 lb  Ideal Body Weight (IBW), Female: 130 lb  % Ideal Body " Weight, Female (lb): 178.24 %  BMI (Calculated): 37.3  BMI Grade: 35 - 39.9 - obesity - grade II  Usual Body Weight (UBW), k.8 kg()  % Usual Body Weight: 98.43       Lab/Procedures/Meds    Pertinent Labs Reviewed: reviewed  Pertinent Labs Comments: BUN 30, GFR 43.8, Glu 168, Mg 1.5  Pertinent Medications Reviewed: reviewed  Pertinent Medications Comments: aspirin, statin, famotidine, lasix, magnesium oxide, mirtazapine, miralax, prednisone, zosyn, tacrolimus    Estimated/Assessed Needs    Weight Used For Calorie Calculations: 103.4 kg (227 lb 15.3 oz)  Energy Calorie Requirements (kcal): 208 kcal/day  Energy Need Method: [place]-St Jeor(x 1.25)  Protein Requirements:  g/day(1.5-2.0 g/kg)  Weight Used For Protein Calculations: 59.1 kg (130 lb 4.7 oz)(IBW)  Fluid Requirements (mL): 1 mL/kcal or per MD  Estimated Fluid Requirement Method: RDA Method  RDA Method (mL):      Nutrition Prescription Ordered    Current Diet Order: Cardiac  Nutrition Order Comments: 1500mL FR    Evaluation of Received Nutrient/Fluid Intake    Other Calories (kcal): 0  I/O: -14.9L since   Comments: LBM   Tolerance: tolerating  % Intake of Estimated Energy Needs: 25 - 50 %  % Meal Intake: 0 - 25 %    Nutrition Risk    Level of Risk/Frequency of Follow-up: low     Assessment and Plan     Nutrition Problem  Increased nutrient needs     Related to (etiology):   Physiological causes related to healing     Signs and Symptoms (as evidenced by):   S/p OHTx       Interventions (treatment strategy):  Collaboration of nutrition care with other providers  Referral of care  Modified diet-cardiac  Vitamins-MVI  Diet education-Post transplant nutrition therapy     Nutrition Diagnosis Status:   Continues     Monitor and Evaluation    Food and Nutrient Intake: energy intake, food and beverage intake  Food and Nutrient Adminstration: diet order  Knowledge/Beliefs/Attitudes: food and nutrition knowledge/skill  Physical  Activity and Function: nutrition-related ADLs and IADLs  Anthropometric Measurements: weight, weight change, body mass index  Biochemical Data, Medical Tests and Procedures: electrolyte and renal panel, gastrointestinal profile, glucose/endocrine profile, inflammatory profile  Nutrition-Focused Physical Findings: overall appearance     Malnutrition Assessment                 Orbital Region (Subcutaneous Fat Loss): well nourished  Upper Arm Region (Subcutaneous Fat Loss): well nourished  Thoracic and Lumbar Region: (BERNARD, restrained)   Yazdanism Region (Muscle Loss): mild depletion  Clavicle Bone Region (Muscle Loss): mild depletion  Clavicle and Acromion Bone Region (Muscle Loss): well nourished  Scapular Bone Region (Muscle Loss): (BERNARD)  Dorsal Hand (Muscle Loss): well nourished  Patellar Region (Muscle Loss): well nourished  Anterior Thigh Region (Muscle Loss): well nourished  Posterior Calf Region (Muscle Loss): well nourished   Edema (Fluid Accumulation): 2-->mild             Nutrition Follow-Up    RD Follow-up?: Yes

## 2019-12-30 NOTE — PROGRESS NOTES
"[facility]  Endocrinology  Progress Note    Admit Date: 2019     Reason for Consult: Management of Post transplant Hyperglycemia     Surgical Procedure and Date:   Heart Transplant 2019      HPI:   Patient is a 49 y.o. female with a diagnosis of NICM, s/p HM3 implantation 9/10/19, V-fib reported in setting of hypokalemia with appropriate shock from ICD on Amiodarone prior to LVAD placement. Patient is now s/p heart transplant. Endocrinology consulted to manage hyperglycemia s/p heart surgery.     Lab Results   Component Value Date    HGBA1C 6.5 (H) 2019             Interval HPI:   Overnight events:  BG is reasonably controlled on current PRN SQ insulin regimen. However, patient is scheduled for discharge today.     Eatin%  Nausea: No  Hypoglycemia and intervention: No  Fever: No  TPN and/or TF: No  If yes, type of TF/TPN and rate: None    /75   Pulse 87   Temp 98.5 °F (36.9 °C) (Oral)   Resp 15   Ht 5' 6" (1.676 m)   Wt 104.9 kg (231 lb 4.2 oz)   LMP  (LMP Unknown)   SpO2 (!) 94%   Breastfeeding? No   BMI 37.33 kg/m²      Labs Reviewed and Include    Recent Labs   Lab 19  0633   *   CALCIUM 9.0   ALBUMIN 2.8*   PROT 6.1      K 3.9   CO2 29   CL 97   BUN 30*   CREATININE 1.4   ALKPHOS 94   ALT 25   AST 18   BILITOT 0.6     Lab Results   Component Value Date    WBC 12.69 2019    HGB 8.3 (L) 2019    HCT 28.5 (L) 2019    MCV 93 2019     (H) 2019     No results for input(s): TSH, FREET4 in the last 168 hours.  Lab Results   Component Value Date    HGBA1C 6.5 (H) 2019       Nutritional status:   Body mass index is 37.33 kg/m².  Lab Results   Component Value Date    ALBUMIN 2.8 (L) 2019    ALBUMIN 2.8 (L) 2019    ALBUMIN 2.9 (L) 2019     Lab Results   Component Value Date    PREALBUMIN 27 2019    PREALBUMIN 28 2019    PREALBUMIN 29 2019       Estimated Creatinine " Clearance: 58.8 mL/min (based on SCr of 1.4 mg/dL).    Accu-Checks  Recent Labs     12/27/19  2224 12/28/19  0811 12/28/19  1303 12/28/19  1809 12/28/19  2222 12/29/19  0759 12/29/19  1338 12/29/19  1822 12/29/19  2224 12/30/19  0800   POCTGLUCOSE 203* 138* 201* 206* 162* 126* 162* 218* 153* 134*       Current Medications and/or Treatments Impacting Glycemic Control  Immunotherapy:    Immunosuppressants         Stop Route Frequency     tacrolimus capsule 3 mg      -- Oral Daily     tacrolimus capsule 4 mg      -- Oral Daily     mycophenolate capsule 1,000 mg      -- Oral 2 times daily        Steroids:   Hormones (From admission, onward)    Start     Stop Route Frequency Ordered    12/27/19 2100  predniSONE tablet 10 mg      -- Oral 2 times daily 12/23/19 1029        Pressors:    Autonomic Drugs (From admission, onward)    None        Hyperglycemia/Diabetes Medications:   Antihyperglycemics (From admission, onward)    Start     Stop Route Frequency Ordered    12/30/19 1145  SITagliptin tablet 50 mg      -- Oral Daily 12/30/19 1141    12/18/19 1700  insulin aspart U-100 pen 0-5 Units      -- SubQ Before meals & nightly PRN 12/18/19 1600          ASSESSMENT and PLAN    * Status post heart transplant  Managed per primary team  Avoid hypoglycemia        Stress hyperglycemia  BG goal 140 - 180       BG Monitoring AC/HS   Low Dose SQ Insulin Correction Scale PRN BG excursions.   Start Januvia 50 mg daily.       Discharge planning:   - Januvia 50 mg daily. Patient will remain on steroid therapy.   - Insurance preferred diabetes testing supplies  - BG logs provided to patient at bedside.   - After discussing evaluation results, patient agrees with proposed plan of care, has no further concerns, and agrees to follow[place]up [place] [facility] by sending pt logs via patient portal and/or mail.           Adverse effect of adrenal cortical steroids, sequela  On steroid therapy per transplant team; may elevate BG  readings            Sonny Boland, NP  Endocrinology  [facility]

## 2019-12-30 NOTE — PLAN OF CARE
Problem: Oral Intake Inadequate (Heart Transplant)  Goal: Optimal Nutrition Intake  Outcome: Ongoing, Progressing  Intervention: Promote and Optimize Nutrition Intake  Flowsheets (Taken 12/30/2019 5828)  Oral Nutrition Promotion: calorie dense foods provided; calorie dense liquids provided; nutritional therapy counseling provided     Recommendations  1.) Continue cardiac diet, fluid per MD.   2.) If PO intakes <50% of meals, suggest Boost Plus.   3.) Suggest MVI.   4.) Daily weights     Goals: 1.) Pt to consume/tolerate >75% EEN and EPN by follow up  Nutrition Goal Status: progressing towards goal  Communication of RD Recs: (POC)

## 2019-12-30 NOTE — SUBJECTIVE & OBJECTIVE
Interval History: C/o generalized chest and upper back pain. Otherwise feeling well. Excited to go home to [facility] today. Plan for 1st biopsy tomorrow.     Lines:   Midline: Right cephalic vein: 12/4/19  Pacer Wires: 12/16/19 (to be removed today or prior to DC)    Scheduled Meds:   aspirin  81 mg Oral Daily    atorvastatin  20 mg Oral Daily    famotidine  20 mg Oral Daily    ferrous gluconate  324 mg Oral TID WM    furosemide  80 mg Oral BID    gabapentin  100 mg Oral TID    magnesium oxide  400 mg Oral BID    mirtazapine  15 mg Oral QHS    mycophenolate  1,000 mg Oral BID    nystatin  500,000 Units Mouth/Throat QID (WM & HS)    polyethylene glycol  17 g Oral Daily    predniSONE  10 mg Oral BID    SITagliptin  50 mg Oral Daily    sulfamethoxazole-trimethoprim 400-80mg  1 tablet Oral Daily    tacrolimus  3 mg Oral Daily AM    tacrolimus  4 mg Oral Daily PM    theophylline  200 mg Oral BID    valGANciclovir  450 mg Oral Daily    venlafaxine  150 mg Oral Daily     PRN Meds:acetaminophen, Dextrose 10% Bolus, Dextrose 10% Bolus, glucagon (human recombinant), glucose, glucose, insulin aspart U-100, oxyCODONE, oxyCODONE, senna-docusate 8.6-50 mg, zolpidem    Review of patient's allergies indicates:   Allergen Reactions    Adhesive Blisters     Reaction to area in chest and up only    Codeine Itching     Objective:     Vital Signs (Most Recent):  Temp: 97.9 °F (36.6 °C) (12/30/19 1146)  Pulse: 87 (12/30/19 1108)  Resp: 15 (12/30/19 0800)  BP: 114/75 (12/30/19 0800)  SpO2: (!) 94 % (12/30/19 0800) Vital Signs (24h Range):  Temp:  [97.3 °F (36.3 °C)-98.5 °F (36.9 °C)] 97.9 °F (36.6 °C)  Pulse:  [] 87  Resp:  [15-17] 15  SpO2:  [94 %-98 %] 94 %  BP: (114-137)/(74-76) 114/75     Patient Vitals for the past 72 hrs (Last 3 readings):   Weight   12/30/19 0335 104.9 kg (231 lb 4.2 oz)   12/29/19 0522 105.6 kg (232 lb 12.9 oz)   12/28/19 0600 105.2 kg (231 lb 13 oz)     Body mass index is 37.33  kg/m².      Intake/Output Summary (Last 24 hours) at 12/30/2019 1218  Last data filed at 12/30/2019 0600  Gross per 24 hour   Intake 480 ml   Output 2350 ml   Net -1870 ml     Hemodynamic Parameters:    Telemetry: reviewed: sinus tach: rates in 80's-90's    Physical Exam   Constitutional: She is oriented to person, place, and time. She appears well-developed and well-nourished.   HENT:   Head: Normocephalic.   Neck: Normal range of motion. No JVD present.   Cardiovascular: Normal rate, regular rhythm and normal heart sounds.   No murmur heard.  Pulmonary/Chest: Effort normal and breath sounds normal.   Healing post surgical sternotomy wound    Abdominal: Soft. Bowel sounds are normal.   Musculoskeletal: Normal range of motion.   Right groin incision healing well, soft, unable to palpate hematoma   Neurological: She is alert and oriented to person, place, and time.   Skin: Skin is warm and dry. Capillary refill takes less than 2 seconds.     Significant Labs:  CBC:  Recent Labs   Lab 12/28/19  0630 12/29/19  0643 12/30/19  0633   WBC 14.04* 14.03* 12.69   RBC 2.88* 3.07* 3.08*   HGB 7.9* 8.3* 8.3*   HCT 26.5* 28.4* 28.5*   * 368* 381*   MCV 92 93 93   MCH 27.4 27.0 26.9*   MCHC 29.8* 29.2* 29.1*     BNP:  Recent Labs   Lab 12/24/19  0655 12/30/19  1017   * 403*     CMP:  Recent Labs   Lab 12/27/19  0713  12/28/19  0630 12/28/19  1505 12/29/19  0643 12/30/19  0633   *   < > 179* 190* 126* 168*   CALCIUM 8.6*   < > 8.7 9.0 8.9 9.0   ALBUMIN 2.9*  --  2.8*  --   --  2.8*   PROT 5.5*  --  5.9*  --   --  6.1      < > 138 141 140 137   K 4.4   < > 4.6 3.9 4.1 3.9   CO2 30*   < > 30* 28 29 29   CL 98   < > 99 101 99 97   BUN 40*   < > 37* 38* 31* 30*   CREATININE 1.6*   < > 1.6* 1.6* 1.4 1.4   ALKPHOS 79  --  80  --   --  94   ALT 25  --  22  --   --  25   AST 15  --  18  --   --  18   BILITOT 0.8  --  0.5  --   --  0.6    < > = values in this interval not displayed.      Coagulation:   No results  for input(s): PT, INR, APTT in the last 168 hours.  LDH:  No results for input(s): LDH in the last 72 hours.  Microbiology:  Microbiology Results (last 7 days)     ** No results found for the last 168 hours. **        have reviewed all pertinent labs within the past 24 hours.    Estimated Creatinine Clearance: 58.8 mL/min (based on SCr of 1.4 mg/dL).    Diagnostic Results:  I have reviewed all pertinent imaging results/findings within the past 24 hours.

## 2019-12-30 NOTE — PROGRESS NOTES
"Discharge Note:    SW met with pt alone in pt's room in order to review discharge plan. Pt was AAOx4, pleasant but does report feeling stir-crazy and anxious due to being in the hospital for awhile. Pt reports that she things she is having the "steroid-blues." SW provided support, validation and reflective listening. Pt states that she is still experiencing pain, but that she feels discharge is appropriate. Pt aware that she can always contact coordinators if she feels she needs to return. Pt reports that her caregiver Flavia has already set up the [employer] [address] Pt states that Flavia plans to return to the hospital this afternoon and was closing up pt's apartment in [place] this morning. Pt states that caregiver to provide transportation back to the . Pt aware that she has been set up with [email or web address]  for SN and PT at discharge. Pt denying any further needs at this time and reports that she will reach out if anything change. SW remains available for continued psychosocial support, education, resources, and additional d/c planning as needed.     BENIGNO contacted Renay at [email or web address]  (ph#[phone] fax#[phone]) who states they can admit the pt upon her discharge from the hospital. SW faxed discharge summary over. SW remains available.         "

## 2019-12-30 NOTE — PROGRESS NOTES
DISCHARGE NOTE:    Deborah Navas is a 49 y.o. female s/p LVAD explant and heart     transplant on 12/16/2019.     Past Medical History:   Diagnosis Date    Fractures     History of ventricular fibrillation 9/4/2019    History of ventricular tachycardia 9/4/2019    Hyperlipidemia     Migraine     Osteoarthritis        CMV D+R-  Donor HCV Status: negative  Donor HBcAb: negative  Donor HBV YFN: negative  Donor HCV YFN: negative    Hospital Course: She was considered low risk for rejection despite a slightly positive T cell crossmatch since her virtual crossmatch was negative. Regardless, she received ATG to spare CNI for GLO, and DSA will need to be monitored closely. Her creatinine improved at discharge on therapeutic tacrolimus. HTS initiated theophylline for post op bradycardia.     Allergies:   Review of patient's allergies indicates:   Allergen Reactions    Adhesive Blisters     Reaction to area in chest and up only    Codeine Itching       Patient Pharmacy: [facility] [place]    Pharmacy Interventions/Recommendations:    1) Transplant Immunosuppression: TAC 3mg AM, 4mg PM, MMF 1000mg BID, pred 20mg/day    2) Opportunistic Infection prophylaxis: Bactrim SS daily x 1 yr, valcyte 450mg/day x 6 months (increase to 900mg/day if cr <1.4), and nystatin SS QID    3) Patient Counseling/Education:  Provided at bedside    Discharge Medications:   Deborah Navas   Home Medication Instructions MARLEY:[id number]    Printed on:12/30/19 5941   Medication Information                      aspirin (ECOTRIN) 81 MG EC tablet  Take 1 tablet (81 mg total) by mouth once daily.             atorvastatin (LIPITOR) 20 MG tablet  Take 1 tablet (20 mg total) by mouth once daily.             blood sugar diagnostic Strp  Use to test blood glucose 2 (two) times daily.             blood-glucose meter Misc  USE AS DIRECTED             calcium carbonate-vitamin D3 1,000 mg(2,500 mg)-800 unit Tab  Take 1 tablet by mouth once daily.              ferrous sulfate (FEOSOL) 325 mg (65 mg iron) Tab tablet  Take 1 tablet (325 mg total) by mouth 2 (two) times daily.             furosemide (LASIX) 80 MG tablet  Take 1 tablet (80 mg total) by mouth 2 (two) times daily.             gabapentin (NEURONTIN) 300 MG capsule  Take 1 capsule (300 mg total) by mouth every evening.             lancets Misc  Use to test blood glucose 2 (two) times daily.             magnesium oxide (MAG-OX) 400 mg (241.3 mg magnesium) tablet  Take 1 tablet (400 mg total) by mouth once daily.             mirtazapine (REMERON) 15 MG tablet  Take 1 tablet (15 mg total) by mouth every evening.             mycophenolate (CELLCEPT) 250 mg Cap  Take 4 capsules (1,000 mg total) by mouth 2 (two) times daily.             nystatin (MYCOSTATIN) 100,000 unit/mL suspension  Take 5 mLs (500,000 Units total) by mouth 4 (four) times daily with meals and nightly.             oxyCODONE (ROXICODONE) 10 mg Tab immediate release tablet  Take 1 tablet (10 mg total) by mouth every 8 (eight) hours as needed.             predniSONE (DELTASONE) 5 MG tablet  Take 4 tablets (20 mg total) by mouth once daily.             ranitidine (ZANTAC) 150 MG tablet  Take 1 tablet (150 mg total) by mouth nightly.             senna-docusate 8.6-50 mg (PERICOLACE) 8.6-50 mg per tablet  Take 2 tablets by mouth 2 (two) times daily as needed for Constipation.             SITagliptin (JANUVIA) 50 MG Tab  Take 1 tablet (50 mg total) by mouth once daily.             sulfamethoxazole-trimethoprim 400-80mg (BACTRIM,SEPTRA) 400-80 mg per tablet  Take 1 tablet by mouth once daily.             tacrolimus (PROGRAF) 1 MG Cap  Take 3 capsules (3 mg total) by mouth every morning AND 4 capsules (4 mg total) every evening.             theophylline (CRYSTAL-24) 400 mg 24 hr capsule  Take 400 mg by mouth once daily.             valGANciclovir (VALCYTE) 450 mg Tab  Take 1 tablet (450 mg total) by mouth once daily.             venlafaxine (EFFEXOR-XR)  150 MG Cp24  Take 1 capsule (150 mg total) by mouth once daily.             zolpidem (AMBIEN) 5 MG Tab  Take 1 tablet (5 mg total) by mouth nightly as needed for insomnia.                 Deborah and her caregiver verbalized their understanding and had the opportunity to ask questions.

## 2019-12-30 NOTE — PT/OT/SLP PROGRESS
Physical Therapy Treatment    Patient Name:  Deborah Navas   MRN:  [id number]    Recommendations:     Discharge Recommendations:  home health PT   Discharge Equipment Recommendations: shower chair   Barriers to discharge: Inaccessible home    Assessment:     Deborah Navas is a 49 y.o. female admitted with a medical diagnosis of Status post heart transplant.  She presents with the following impairments/functional limitations:  weakness, impaired endurance, impaired self care skills, gait instability, impaired functional mobilty, impaired balance, decreased ROM, decreased coordination, impaired cardiopulmonary response to activity .Pt presents with improved overall functional mobility requiring decreased assistance and increased activity tolerance to perform functional mobility. pt would benefit from continued acute PT intervention to address listed functional deficits, provide patient/caregiver education, reduce fall risk, and maximize (I) and safety with functional mobility. Goals remain appropriate      Rehab Prognosis: Good; patient would benefit from acute skilled PT services to address these deficits and reach maximum level of function.    Recent Surgery: Procedure(s) (LRB):  TRANSPLANT, HEART (N/A) 14 Days Post-Op    Plan:     During this hospitalization, patient to be seen 4 x/week to address the identified rehab impairments via gait training, therapeutic activities, therapeutic exercises, neuromuscular re-education and progress toward the following goals:    · Plan of Care Expires:  01/16/20    Subjective     Chief Complaint: fatigue  Patient/Family Comments/goals: I get SOB when I walk and talk  Pain/Comfort:  · Pain Rating 1: 0/10  · Pain Rating Post-Intervention 1: 0/10      Objective:     Communicated with RN prior to session.  Patient found supine with telemetry upon PT entry to room.     General Precautions: Standard, fall, sternal   Orthopedic Precautions:N/A   Braces: N/A      Functional Mobility:  · Bed Mobility:     · Supine to Sit: stand by assistance  · Sit to Supine: stand by assistance  · Sit to Stand:  supervision with no AD from EOB; min A from  chair   · Gait: 115ft x 2  with SBA with no AD with mask on. Pt O2 sats went to 88% on RA with gait required rest break and return to 90% and greater  Pt demo'd decreased jonathan     AM-PAC 6 CLICK MOBILITY  Turning over in bed (including adjusting bedclothes, sheets and blankets)?: 3  Sitting down on and standing up from a chair with arms (e.g., wheelchair, bedside commode, etc.): 3  Moving from lying on back to sitting on the side of the bed?: 3  Moving to and from a bed to a chair (including a wheelchair)?: 3  Need to walk in hospital room?: 3  Climbing 3-5 steps with a railing?: 2  Basic Mobility Total Score: 17       Therapeutic Activities and Exercises:   Educated patient on progress, safety,d/c,PT POC, on the effects of prolonged immobility and the importance of performing OOB activity and exercises to promote healing and reduce recovery time   Patient performed therex X seated  B LE AROM AP, LAQ, Hip Flexion, Hip Abd/Add , B UE therex, standing with B HHA hip flexion and knee flexion  Updated white board with appropriate PT mobility information for medical team notification  Donned an extra gown   Pt encouraged to ambulate in hallways 3x/day with nursing or family assistance to improve endurance.   Pt safe to ambulate in hallway with RN or PCT assistance   Bedside table in front of patient and area set up for function, convenience, and safety. RN aware of patient's mobility needs and status. Questions/concerns addressed within PTA scope of practice; patient with no further questions. Time was provided for active listening, discussion of health disposition, and discussion of safe discharge. Pt?verbalized?agreement     Patient left supine with all lines intact, call button in reach and nsg notified..    GOALS:   Multidisciplinary  Problems     Physical Therapy Goals        Problem: Physical Therapy Goal    Goal Priority Disciplines Outcome Goal Variances Interventions   Physical Therapy Goal     PT, PT/OT Ongoing, Progressing     Description:  Goals to be met by: 2020     Patient will increase functional independence with mobility by performin.Supine to Sit with CGA - not met  2. Sit to stand with Supervision - not met  3. Gait x 150ft with Supervision - not met  4. Ascend/descend 4 stairs with CGA - not met  5. Standing for 3 minutes with CGA to increase activity tolerance - not met                        Time Tracking:     PT Received On: 19  PT Start Time: 1146     PT Stop Time: 1210  PT Total Time (min): 24 min     Billable Minutes: Gait Training 15 and Therapeutic Exercise 9    Treatment Type: Treatment  PT/PTA: PTA     PTA Visit Number: 3     Trevin Tripathi PTA  2019

## 2019-12-30 NOTE — PROGRESS NOTES
[facility]  Heart Transplant  Progress Note    Patient Name: Deborah Navas  MRN: [id number]  Admission Date: 11/13/2019  Hospital Length of Stay: 45 days  Attending Physician: Eric Antunez Jr.,*  Primary Care Provider: Primary Doctor No  Principal Problem:Status post heart transplant    Subjective:     Interval History: C/o generalized chest and upper back pain. Otherwise feeling well. Excited to go home to [place] today. Plan for 1st biopsy tomorrow.     Lines:   Midline: Right cephalic vein: 12/4/19  Pacer Wires: 12/16/19 (to be removed today or prior to DC)    Scheduled Meds:   aspirin  81 mg Oral Daily    atorvastatin  20 mg Oral Daily    famotidine  20 mg Oral Daily    ferrous gluconate  324 mg Oral TID WM    furosemide  80 mg Oral BID    gabapentin  100 mg Oral TID    magnesium oxide  400 mg Oral BID    mirtazapine  15 mg Oral QHS    mycophenolate  1,000 mg Oral BID    nystatin  500,000 Units Mouth/Throat QID (WM & HS)    polyethylene glycol  17 g Oral Daily    predniSONE  10 mg Oral BID    SITagliptin  50 mg Oral Daily    sulfamethoxazole-trimethoprim 400-80mg  1 tablet Oral Daily    tacrolimus  3 mg Oral Daily AM    tacrolimus  4 mg Oral Daily PM    theophylline  200 mg Oral BID    valGANciclovir  450 mg Oral Daily    venlafaxine  150 mg Oral Daily     PRN Meds:acetaminophen, Dextrose 10% Bolus, Dextrose 10% Bolus, glucagon (human recombinant), glucose, glucose, insulin aspart U-100, oxyCODONE, oxyCODONE, senna-docusate 8.6-50 mg, zolpidem    Review of patient's allergies indicates:   Allergen Reactions    Adhesive Blisters     Reaction to area in chest and up only    Codeine Itching     Objective:     Vital Signs (Most Recent):  Temp: 97.9 °F (36.6 °C) (12/30/19 1146)  Pulse: 87 (12/30/19 1108)  Resp: 15 (12/30/19 0800)  BP: 114/75 (12/30/19 0800)  SpO2: (!) 94 % (12/30/19 0800) Vital Signs (24h Range):  Temp:  [97.3 °F (36.3 °C)-98.5 °F (36.9 °C)]  97.9 °F (36.6 °C)  Pulse:  [] 87  Resp:  [15-17] 15  SpO2:  [94 %-98 %] 94 %  BP: (114-137)/(74-76) 114/75     Patient Vitals for the past 72 hrs (Last 3 readings):   Weight   12/30/19 0335 104.9 kg (231 lb 4.2 oz)   12/29/19 0522 105.6 kg (232 lb 12.9 oz)   12/28/19 0600 105.2 kg (231 lb 13 oz)     Body mass index is 37.33 kg/m².      Intake/Output Summary (Last 24 hours) at 12/30/2019 1218  Last data filed at 12/30/2019 0600  Gross per 24 hour   Intake 480 ml   Output 2350 ml   Net -1870 ml     Hemodynamic Parameters:    Telemetry: reviewed: sinus tach: rates in 80's-90's    Physical Exam   Constitutional: She is oriented to person, place, and time. She appears well-developed and well-nourished.   HENT:   Head: Normocephalic.   Neck: Normal range of motion. No JVD present.   Cardiovascular: Normal rate, regular rhythm and normal heart sounds.   No murmur heard.  Pulmonary/Chest: Effort normal and breath sounds normal.   Healing post surgical sternotomy wound    Abdominal: Soft. Bowel sounds are normal.   Musculoskeletal: Normal range of motion.   Right groin incision healing well, soft, unable to palpate hematoma   Neurological: She is alert and oriented to person, place, and time.   Skin: Skin is warm and dry. Capillary refill takes less than 2 seconds.     Significant Labs:  CBC:  Recent Labs   Lab 12/28/19  0630 12/29/19  0643 12/30/19  0633   WBC 14.04* 14.03* 12.69   RBC 2.88* 3.07* 3.08*   HGB 7.9* 8.3* 8.3*   HCT 26.5* 28.4* 28.5*   * 368* 381*   MCV 92 93 93   MCH 27.4 27.0 26.9*   MCHC 29.8* 29.2* 29.1*     BNP:  Recent Labs   Lab 12/24/19  0655 12/30/19  1017   * 403*     CMP:  Recent Labs   Lab 12/27/19  0713  12/28/19  0630 12/28/19  1505 12/29/19  0643 12/30/19  0633   *   < > 179* 190* 126* 168*   CALCIUM 8.6*   < > 8.7 9.0 8.9 9.0   ALBUMIN 2.9*  --  2.8*  --   --  2.8*   PROT 5.5*  --  5.9*  --   --  6.1      < > 138 141 140 137   K 4.4   < > 4.6 3.9 4.1 3.9   CO2  30*   < > 30* 28 29 29   CL 98   < > 99 101 99 97   BUN 40*   < > 37* 38* 31* 30*   CREATININE 1.6*   < > 1.6* 1.6* 1.4 1.4   ALKPHOS 79  --  80  --   --  94   ALT 25  --  22  --   --  25   AST 15  --  18  --   --  18   BILITOT 0.8  --  0.5  --   --  0.6    < > = values in this interval not displayed.      Coagulation:   No results for input(s): PT, INR, APTT in the last 168 hours.  LDH:  No results for input(s): LDH in the last 72 hours.  Microbiology:  Microbiology Results (last 7 days)     ** No results found for the last 168 hours. **        have reviewed all pertinent labs within the past 24 hours.    Estimated Creatinine Clearance: 58.8 mL/min (based on SCr of 1.4 mg/dL).    Diagnostic Results:  I have reviewed all pertinent imaging results/findings within the past 24 hours.    Assessment and Plan:     48 yo WF with NICM, s/p HM3 implantation 9/10/19 (post up coarse uncomplicated with the exception of+ diaphragmatic stimulation of LV lead and pleural effusion with chest tube placement, atrial flutter with NADINE/DCCV), V-fib reported in setting of hypokalemia with appropriate shock from ICD on Amiodarone prior to LVAD placement; and recent hospitalizations for ventricular arrythmias; started on Mexilitine.  She was seen in EP clinic today and she continues to have multiple VT episodes with some ATP therapy, although no ICD shocks since starting mexiletine 10/24/2019. One slow VT episode 2.5hrs 11/3/2019. Patient asymptomatic with LVAD. On coumadin. No AFL since post-op event (paced out of rhythm 9/24/2019). CHB with 100% V pacing (LV lead off). She does not feel well. Dry heaving with mexiletine. Taking phenergan PRN. 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   She was seen in HTS clinic and reported 4 low flow alarms the last 4 nights.  She reports they occur in her sleep and last for only a few seconds.  By the time she wakes up; they quickly stop.  She does report to  possibly being little volume overloaded.  She hasn't noticed weight gain at home but thinks she may have little extra fluid.  She denies SOB, chest pain, palpitations, LEGER. In review of her records: she was 223lbs on 10/24/19 during previous hospitalization and is 235lbs now.  Her lasix had previously been decreased to prn.     * Status post heart transplant  -Transplant Date 12/16/2019. HM 3 implanted 9/10/19 removed during transplant.    -HTS Primary.  -Transplanted by: Dr. Nuñez.  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  - weaned off. Continues Lasix PO 80 mg BID as of 12/26.  Tolerating well. Euvolemic on day of discharge.   -Prednisone 10 mg BID, MMF 1000 BID (CMV high risk); Prograf 3/4. 2 Doses of Thymo given 12/18, 12/19 due to and creat of 3.3. On Bactrim and Valacyclovir   -No biopsy this week. Will plan for biopsy #1 12/31/19.   -Prophylaxis- Nystatin, Valcyte   -theophylline added for HR, improved to 80-90's  -Chest tubes removed. Pacer wires to be removed today by CTS prior to discharge.  -Right groin discomfort: ultra-sounded groin and fluid collection noted. CTS to review prior to patients discharge.  -Possible DC today      Stress hyperglycemia  - endocrine following, appreciate recs    GLO (acute kidney injury)  - Baseline creat was 1.3-1.6  - Acute elevation post transplant  - Will avoid nephrotoxins and monitor       CKD (chronic kidney disease)  - Baseline creat appears 1.3-1.6. 1.4 on day of discharge.  - see above GLO          BALDEMAR EppsC  Heart Transplant  [facility]

## 2019-12-30 NOTE — NURSING
Heart Transplant Coordinator Education  1. Wounds - examined groin>approximated; slightly red; sternum approximated with sutures; AICD wound approximated and healing; sump sites and chest tube sites are not draining and are healing  2. Diabetes Issues - Pt has required insulin so they are reconsulting Endocrine. Pt knows to ask for Rx for med, supplies (ie. Lancets, needles, syringes, strips, monitor) if it is deemed necessary. Our team is aware  3. Vital Signs - Reviewed parameters for BP and HR. Since her HR has been in 80's, she has been started on Theophylline.   4. GI Issues - discussed vomiting and diarrhea and when to call  5. Pain - back is hurting; talked about which drugs are allowed OTC. Can use creams such as Ronnie Sanders for muscle aches  6. Schedule - discussed tomorrow for lab and echo guided biopsy and nurse visit.   7. Medications - Tien MclaughlinD to review with her and give her one month of meds upon discharge  8. Nutrition and Fluids - Understands restrictions     Will check with her and her caregiver before she goes to [facility]

## 2019-12-30 NOTE — PLAN OF CARE
VSS. Pt d/c. Pt was taught about insulin and how to test BS. Pt placed on Januvia. BS WNR. No issues today. PIV removed. Tele, VISI and Ipad removed from the room. Pt left in a wheelchair w/ family and transport.

## 2019-12-30 NOTE — PROGRESS NOTES
Medication education was provided to the patient and caregiver re: post-transplant immunosuppressive medications.  Emphasized the importance of compliance, role of the blue medication card, concerns for drug interactions, and process of obtaining refills.  Counseled regarding Prograf, Cellcept (prednisone), including directions for use, monitoring, how to handle missed doses, and side effects. Patient and caregiver verbalized understanding and had the opportunity to ask questions.

## 2019-12-30 NOTE — NURSING
Rounds Report: Attended interdisciplinary rounds this afternoon   with the transplant team including SW, physicians, fellows,  mid-level providers, and transplant coordinators.Discussed plan of care, including DC date of today. Will return tomorrow for EGBX, lab and nurse visit.

## 2019-12-31 ENCOUNTER — TELEPHONE (OUTPATIENT)
Dept: TRANSPLANT | Facility: CLINIC | Age: 49
End: 2019-12-31

## 2019-12-31 DIAGNOSIS — Z94.1 S/P ORTHOTOPIC HEART TRANSPLANT: ICD-10-CM

## 2019-12-31 PROBLEM — Z76.82 KIDNEY TRANSPLANT CANDIDATE: Status: RESOLVED | Noted: 2019-12-11 | Resolved: 2019-12-31

## 2019-12-31 PROBLEM — Z86.79 HISTORY OF VENTRICULAR FIBRILLATION: Status: RESOLVED | Noted: 2019-09-04 | Resolved: 2019-12-31

## 2019-12-31 PROBLEM — Z76.82 HEART TRANSPLANT CANDIDATE: Status: RESOLVED | Noted: 2019-09-04 | Resolved: 2019-12-31

## 2019-12-31 PROBLEM — Z79.01 LONG TERM (CURRENT) USE OF ANTICOAGULANTS: Status: RESOLVED | Noted: 2019-10-02 | Resolved: 2019-12-31

## 2019-12-31 PROBLEM — J90 PLEURAL EFFUSION: Status: RESOLVED | Noted: 2019-09-16 | Resolved: 2019-12-31

## 2019-12-31 PROBLEM — I49.01 VENTRICULAR FIBRILLATION: Status: RESOLVED | Noted: 2017-10-28 | Resolved: 2019-12-31

## 2019-12-31 PROBLEM — Z86.79 HISTORY OF VENTRICULAR TACHYCARDIA: Status: RESOLVED | Noted: 2019-09-04 | Resolved: 2019-12-31

## 2019-12-31 LAB
ASCENDING AORTA: 2.07 CM
AV INDEX (PROSTH): 0.83
AV MEAN GRADIENT: 7 MMHG
AV PEAK GRADIENT: 11 MMHG
AV VALVE AREA: 2.57 CM2
AV VELOCITY RATIO: 0.69
BSA FOR ECHO PROCEDURE: 2.23 M2
CV ECHO LV RWT: 0.35 CM
DOP CALC AO PEAK VEL: 1.63 M/S
DOP CALC AO VTI: 25.36 CM
DOP CALC LVOT AREA: 3.1 CM2
DOP CALC LVOT DIAMETER: 1.99 CM
DOP CALC LVOT PEAK VEL: 1.12 M/S
DOP CALC LVOT STROKE VOLUME: 65.28 CM3
DOP CALCLVOT PEAK VEL VTI: 21 CM
E WAVE DECELERATION TIME: 133.01 MSEC
E/A RATIO: 1.32
E/E' RATIO: 10.59 M/S
ECHO LV POSTERIOR WALL: 0.74 CM (ref 0.6–1.1)
FRACTIONAL SHORTENING: 46 % (ref 28–44)
INTERVENTRICULAR SEPTUM: 0.68 CM (ref 0.6–1.1)
LEFT INTERNAL DIMENSION IN SYSTOLE: 2.28 CM (ref 2.1–4)
LEFT VENTRICLE DIASTOLIC VOLUME INDEX: 37.29 ML/M2
LEFT VENTRICLE DIASTOLIC VOLUME: 80.2 ML
LEFT VENTRICLE MASS INDEX: 41 G/M2
LEFT VENTRICLE SYSTOLIC VOLUME INDEX: 8.3 ML/M2
LEFT VENTRICLE SYSTOLIC VOLUME: 17.82 ML
LEFT VENTRICULAR INTERNAL DIMENSION IN DIASTOLE: 4.24 CM (ref 3.5–6)
LEFT VENTRICULAR MASS: 88.04 G
LV LATERAL E/E' RATIO: 12.86 M/S
LV SEPTAL E/E' RATIO: 9 M/S
MV PEAK A VEL: 0.68 M/S
MV PEAK E VEL: 0.9 M/S
PISA TR MAX VEL: 3.04 M/S
RA PRESSURE: 3 MMHG
RIGHT VENTRICULAR END-DIASTOLIC DIMENSION: 3.85 CM
RV TISSUE DOPPLER FREE WALL SYSTOLIC VELOCITY 1 (APICAL 4 CHAMBER VIEW): 6.36 CM/S
SINUS: 2.62 CM
STJ: 2.34 CM
TDI LATERAL: 0.07 M/S
TDI SEPTAL: 0.1 M/S
TDI: 0.09 M/S
TR MAX PG: 37 MMHG
TRICUSPID ANNULAR PLANE SYSTOLIC EXCURSION: 1.59 CM
TV REST PULMONARY ARTERY PRESSURE: 40 MMHG

## 2019-12-31 NOTE — TELEPHONE ENCOUNTER
Spoke to caregiver, Flavia, regarding lab results. Encouraged hydration since her Cr 1.7. Will repeat Tac level, bmp and bnp Thursday 1/2. Will have a nurse visit after the lab.

## 2020-01-01 PROCEDURE — G0180 MD CERTIFICATION HHA PATIENT: HCPCS | Mod: ,,, | Performed by: INTERNAL MEDICINE

## 2020-01-01 PROCEDURE — G0180 PR HOME HEALTH MD CERTIFICATION: ICD-10-PCS | Mod: ,,, | Performed by: INTERNAL MEDICINE

## 2020-01-02 ENCOUNTER — TELEPHONE (OUTPATIENT)
Dept: TRANSPLANT | Facility: CLINIC | Age: 50
End: 2020-01-02

## 2020-01-02 ENCOUNTER — HOSPITAL ENCOUNTER (INPATIENT)
Facility: HOSPITAL | Age: 50
LOS: 8 days | Discharge: HOME | DRG: 286 | End: 2020-01-10
Attending: INTERNAL MEDICINE | Admitting: INTERNAL MEDICINE
Payer: COMMERCIAL

## 2020-01-02 DIAGNOSIS — D86.85 CARDIAC SARCOIDOSIS: Primary | ICD-10-CM

## 2020-01-02 DIAGNOSIS — Z94.1 STATUS POST HEART TRANSPLANT: ICD-10-CM

## 2020-01-02 DIAGNOSIS — E04.2 MULTINODULAR GOITER: ICD-10-CM

## 2020-01-02 DIAGNOSIS — I50.813 ACUTE ON CHRONIC RIGHT-SIDED HEART FAILURE: ICD-10-CM

## 2020-01-02 DIAGNOSIS — D86.9 SARCOIDOSIS: ICD-10-CM

## 2020-01-02 DIAGNOSIS — I50.9 HEART FAILURE: ICD-10-CM

## 2020-01-02 DIAGNOSIS — N17.9 AKI (ACUTE KIDNEY INJURY): ICD-10-CM

## 2020-01-02 LAB
ALLENS TEST: ABNORMAL
ASCENDING AORTA: 2.53 CM
AV INDEX (PROSTH): 0.92
AV MEAN GRADIENT: 9 MMHG
AV PEAK GRADIENT: 15 MMHG
AV VALVE AREA: 2.47 CM2
AV VELOCITY RATIO: 0.7
BACTERIA #/AREA URNS AUTO: ABNORMAL /HPF
BILIRUB UR QL STRIP: NEGATIVE
BSA FOR ECHO PROCEDURE: 2.26 M2
CLARITY UR REFRACT.AUTO: CLEAR
COLOR UR AUTO: YELLOW
CV ECHO LV RWT: 0.46 CM
DOP CALC AO PEAK VEL: 1.91 M/S
DOP CALC AO VTI: 29.57 CM
DOP CALC LVOT AREA: 2.7 CM2
DOP CALC LVOT DIAMETER: 1.85 CM
DOP CALC LVOT PEAK VEL: 1.34 M/S
DOP CALC LVOT STROKE VOLUME: 73.13 CM3
DOP CALCLVOT PEAK VEL VTI: 27.22 CM
E WAVE DECELERATION TIME: 266.3 MSEC
E/A RATIO: 3.38
E/E' RATIO: 15.63 M/S
ECHO LV POSTERIOR WALL: 0.99 CM (ref 0.6–1.1)
FRACTIONAL SHORTENING: 27 % (ref 28–44)
GLUCOSE UR QL STRIP: NEGATIVE
HCO3 UR-SCNC: 31.2 MMOL/L (ref 24–28)
HGB UR QL STRIP: NEGATIVE
HYALINE CASTS UR QL AUTO: 4 /LPF
INTERVENTRICULAR SEPTUM: 1.03 CM (ref 0.6–1.1)
KETONES UR QL STRIP: NEGATIVE
LA MAJOR: 5.1 CM
LA MINOR: 3.6 CM
LA WIDTH: 2.8 CM
LEFT ATRIUM SIZE: 3.3 CM
LEFT ATRIUM VOLUME INDEX: 15.2 ML/M2
LEFT ATRIUM VOLUME: 33.15 CM3
LEFT INTERNAL DIMENSION IN SYSTOLE: 3.14 CM (ref 2.1–4)
LEFT VENTRICLE DIASTOLIC VOLUME INDEX: 38.62 ML/M2
LEFT VENTRICLE DIASTOLIC VOLUME: 84.38 ML
LEFT VENTRICLE MASS INDEX: 67 G/M2
LEFT VENTRICLE SYSTOLIC VOLUME INDEX: 17.9 ML/M2
LEFT VENTRICLE SYSTOLIC VOLUME: 39.14 ML
LEFT VENTRICULAR INTERNAL DIMENSION IN DIASTOLE: 4.33 CM (ref 3.5–6)
LEFT VENTRICULAR MASS: 146.09 G
LEUKOCYTE ESTERASE UR QL STRIP: ABNORMAL
LV LATERAL E/E' RATIO: 13.89 M/S
LV SEPTAL E/E' RATIO: 17.86 M/S
MICROSCOPIC COMMENT: ABNORMAL
MV PEAK A VEL: 0.37 M/S
MV PEAK E VEL: 1.25 M/S
NITRITE UR QL STRIP: NEGATIVE
PCO2 BLDA: 43.6 MMHG (ref 35–45)
PH SMN: 7.46 [PH] (ref 7.35–7.45)
PH UR STRIP: 7 [PH] (ref 5–8)
PISA TR MAX VEL: 3.05 M/S
PO2 BLDA: 22 MMHG (ref 40–60)
POC BE: 7 MMOL/L
POC SATURATED O2: 42 % (ref 95–100)
POC TCO2: 32 MMOL/L (ref 24–29)
POCT GLUCOSE: 248 MG/DL (ref 70–110)
PROT UR QL STRIP: NEGATIVE
RA MAJOR: 2.1 CM
RA WIDTH: 3.6 CM
RBC #/AREA URNS AUTO: 3 /HPF (ref 0–4)
SAMPLE: ABNORMAL
SINUS: 2.22 CM
SITE: ABNORMAL
SP GR UR STRIP: 1.01 (ref 1–1.03)
SQUAMOUS #/AREA URNS AUTO: 1 /HPF
STJ: 2.47 CM
TDI LATERAL: 0.09 M/S
TDI SEPTAL: 0.07 M/S
TDI: 0.08 M/S
TR MAX PG: 37 MMHG
TRICUSPID ANNULAR PLANE SYSTOLIC EXCURSION: 1.47 CM
URN SPEC COLLECT METH UR: ABNORMAL
WBC #/AREA URNS AUTO: 4 /HPF (ref 0–5)

## 2020-01-02 PROCEDURE — 99223 1ST HOSP IP/OBS HIGH 75: CPT | Mod: ,,, | Performed by: INTERNAL MEDICINE

## 2020-01-02 PROCEDURE — 20600001 HC STEP DOWN PRIVATE ROOM

## 2020-01-02 PROCEDURE — 82803 BLOOD GASES ANY COMBINATION: CPT

## 2020-01-02 PROCEDURE — 99900035 HC TECH TIME PER 15 MIN (STAT)

## 2020-01-02 PROCEDURE — 99223 PR INITIAL HOSPITAL CARE,LEVL III: ICD-10-PCS | Mod: ,,, | Performed by: INTERNAL MEDICINE

## 2020-01-02 PROCEDURE — 25000003 PHARM REV CODE 250: Performed by: STUDENT IN AN ORGANIZED HEALTH CARE EDUCATION/TRAINING PROGRAM

## 2020-01-02 PROCEDURE — 81001 URINALYSIS AUTO W/SCOPE: CPT

## 2020-01-02 PROCEDURE — 63600175 PHARM REV CODE 636 W HCPCS: Performed by: STUDENT IN AN ORGANIZED HEALTH CARE EDUCATION/TRAINING PROGRAM

## 2020-01-02 RX ORDER — TACROLIMUS 1 MG/1
4 CAPSULE ORAL EVERY EVENING
Status: DISCONTINUED | OUTPATIENT
Start: 2020-01-02 | End: 2020-01-02

## 2020-01-02 RX ORDER — FAMOTIDINE 20 MG/1
20 TABLET, FILM COATED ORAL DAILY
Status: DISCONTINUED | OUTPATIENT
Start: 2020-01-02 | End: 2020-01-09

## 2020-01-02 RX ORDER — VALGANCICLOVIR 450 MG/1
450 TABLET, FILM COATED ORAL DAILY
Status: DISCONTINUED | OUTPATIENT
Start: 2020-01-02 | End: 2020-01-06

## 2020-01-02 RX ORDER — PROMETHAZINE HYDROCHLORIDE 25 MG/1
25 TABLET ORAL EVERY 6 HOURS PRN
Status: DISCONTINUED | OUTPATIENT
Start: 2020-01-02 | End: 2020-01-10 | Stop reason: HOSPADM

## 2020-01-02 RX ORDER — DOBUTAMINE HYDROCHLORIDE 400 MG/100ML
2.5 INJECTION, SOLUTION INTRAVENOUS CONTINUOUS
Status: DISCONTINUED | OUTPATIENT
Start: 2020-01-02 | End: 2020-01-06

## 2020-01-02 RX ORDER — MYCOPHENOLATE MOFETIL 250 MG/1
1000 CAPSULE ORAL 2 TIMES DAILY
Status: DISCONTINUED | OUTPATIENT
Start: 2020-01-02 | End: 2020-01-10 | Stop reason: HOSPADM

## 2020-01-02 RX ORDER — ATORVASTATIN CALCIUM 20 MG/1
20 TABLET, FILM COATED ORAL DAILY
Status: DISCONTINUED | OUTPATIENT
Start: 2020-01-02 | End: 2020-01-10 | Stop reason: HOSPADM

## 2020-01-02 RX ORDER — ACETAMINOPHEN 325 MG/1
650 TABLET ORAL EVERY 6 HOURS PRN
Status: DISCONTINUED | OUTPATIENT
Start: 2020-01-02 | End: 2020-01-10 | Stop reason: HOSPADM

## 2020-01-02 RX ORDER — ZOLPIDEM TARTRATE 5 MG/1
5 TABLET ORAL NIGHTLY PRN
Status: DISCONTINUED | OUTPATIENT
Start: 2020-01-02 | End: 2020-01-10 | Stop reason: HOSPADM

## 2020-01-02 RX ORDER — MIRTAZAPINE 15 MG/1
15 TABLET, FILM COATED ORAL NIGHTLY
Status: DISCONTINUED | OUTPATIENT
Start: 2020-01-02 | End: 2020-01-03

## 2020-01-02 RX ORDER — NYSTATIN 100000 [USP'U]/ML
500000 SUSPENSION ORAL
Status: DISCONTINUED | OUTPATIENT
Start: 2020-01-02 | End: 2020-01-10 | Stop reason: HOSPADM

## 2020-01-02 RX ORDER — FERROUS SULFATE 325(65) MG
325 TABLET, DELAYED RELEASE (ENTERIC COATED) ORAL 2 TIMES DAILY
Status: DISCONTINUED | OUTPATIENT
Start: 2020-01-02 | End: 2020-01-08

## 2020-01-02 RX ORDER — GABAPENTIN 300 MG/1
300 CAPSULE ORAL NIGHTLY
Status: DISCONTINUED | OUTPATIENT
Start: 2020-01-02 | End: 2020-01-10 | Stop reason: HOSPADM

## 2020-01-02 RX ORDER — AMOXICILLIN 250 MG
2 CAPSULE ORAL 2 TIMES DAILY PRN
Status: DISCONTINUED | OUTPATIENT
Start: 2020-01-02 | End: 2020-01-10 | Stop reason: HOSPADM

## 2020-01-02 RX ORDER — SODIUM CHLORIDE 0.9 % (FLUSH) 0.9 %
10 SYRINGE (ML) INJECTION
Status: DISCONTINUED | OUTPATIENT
Start: 2020-01-02 | End: 2020-01-10 | Stop reason: HOSPADM

## 2020-01-02 RX ORDER — LANOLIN ALCOHOL/MO/W.PET/CERES
400 CREAM (GRAM) TOPICAL DAILY
Status: DISCONTINUED | OUTPATIENT
Start: 2020-01-02 | End: 2020-01-10 | Stop reason: HOSPADM

## 2020-01-02 RX ORDER — OXYCODONE HYDROCHLORIDE 10 MG/1
10 TABLET ORAL EVERY 6 HOURS PRN
Status: DISCONTINUED | OUTPATIENT
Start: 2020-01-02 | End: 2020-01-10 | Stop reason: HOSPADM

## 2020-01-02 RX ORDER — TACROLIMUS 1 MG/1
3 CAPSULE ORAL EVERY MORNING
Status: DISCONTINUED | OUTPATIENT
Start: 2020-01-02 | End: 2020-01-02

## 2020-01-02 RX ORDER — FUROSEMIDE 10 MG/ML
80 INJECTION INTRAMUSCULAR; INTRAVENOUS 2 TIMES DAILY
Status: DISCONTINUED | OUTPATIENT
Start: 2020-01-02 | End: 2020-01-03

## 2020-01-02 RX ORDER — TACROLIMUS 1 MG/1
3 CAPSULE ORAL EVERY MORNING
Status: DISCONTINUED | OUTPATIENT
Start: 2020-01-03 | End: 2020-01-02

## 2020-01-02 RX ORDER — SULFAMETHOXAZOLE AND TRIMETHOPRIM 400; 80 MG/1; MG/1
1 TABLET ORAL DAILY
Status: DISCONTINUED | OUTPATIENT
Start: 2020-01-02 | End: 2020-01-03

## 2020-01-02 RX ORDER — VENLAFAXINE HYDROCHLORIDE 37.5 MG/1
150 CAPSULE, EXTENDED RELEASE ORAL DAILY
Status: DISCONTINUED | OUTPATIENT
Start: 2020-01-02 | End: 2020-01-10 | Stop reason: HOSPADM

## 2020-01-02 RX ORDER — HEPARIN SODIUM 5000 [USP'U]/ML
5000 INJECTION, SOLUTION INTRAVENOUS; SUBCUTANEOUS EVERY 8 HOURS
Status: DISCONTINUED | OUTPATIENT
Start: 2020-01-02 | End: 2020-01-03

## 2020-01-02 RX ORDER — ASPIRIN 81 MG/1
81 TABLET ORAL DAILY
Status: DISCONTINUED | OUTPATIENT
Start: 2020-01-02 | End: 2020-01-10 | Stop reason: HOSPADM

## 2020-01-02 RX ORDER — PREDNISONE 10 MG/1
20 TABLET ORAL DAILY
Status: DISCONTINUED | OUTPATIENT
Start: 2020-01-02 | End: 2020-01-03

## 2020-01-02 RX ORDER — FERROUS SULFATE, DRIED 160(50) MG
2 TABLET, EXTENDED RELEASE ORAL DAILY
Status: DISCONTINUED | OUTPATIENT
Start: 2020-01-02 | End: 2020-01-10 | Stop reason: HOSPADM

## 2020-01-02 RX ADMIN — FUROSEMIDE 80 MG: 10 INJECTION, SOLUTION INTRAMUSCULAR; INTRAVENOUS at 05:01

## 2020-01-02 RX ADMIN — PROMETHAZINE HYDROCHLORIDE 25 MG: 25 TABLET ORAL at 05:01

## 2020-01-02 RX ADMIN — HEPARIN SODIUM 5000 UNITS: 5000 INJECTION, SOLUTION INTRAVENOUS; SUBCUTANEOUS at 09:01

## 2020-01-02 RX ADMIN — FERROUS SULFATE TAB EC 325 MG (65 MG FE EQUIVALENT) 325 MG: 325 (65 FE) TABLET DELAYED RESPONSE at 08:01

## 2020-01-02 RX ADMIN — GABAPENTIN 300 MG: 300 CAPSULE ORAL at 08:01

## 2020-01-02 RX ADMIN — NYSTATIN 500000 UNITS: 100000 SUSPENSION ORAL at 08:01

## 2020-01-02 RX ADMIN — MYCOPHENOLATE MOFETIL 1000 MG: 250 CAPSULE ORAL at 08:01

## 2020-01-02 RX ADMIN — NYSTATIN 500000 UNITS: 100000 SUSPENSION ORAL at 05:01

## 2020-01-02 RX ADMIN — ZOLPIDEM TARTRATE 5 MG: 5 TABLET ORAL at 09:01

## 2020-01-02 RX ADMIN — DOBUTAMINE IN DEXTROSE 2.5 MCG/KG/MIN: 200 INJECTION, SOLUTION INTRAVENOUS at 07:01

## 2020-01-02 RX ADMIN — MIRTAZAPINE 15 MG: 15 TABLET, FILM COATED ORAL at 08:01

## 2020-01-02 NOTE — Clinical Note
The PA catheter is repositioned to the main pulmonary artery. Hemodynamics performed. Cardiac output obtained at 8 L/min. O2 saturation measured at 65%.
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
## 2020-01-02 NOTE — PROGRESS NOTES
PT arrived to floor via wheelchair at this time. Pt complains of nausea pt took home dose of phenergan prior to admit. Will notify team of arrival.

## 2020-01-02 NOTE — ASSESSMENT & PLAN NOTE
-Tacrolimus level slightly elevated at 11--> will hold tonight and recheck  -Continue mycophenolate 1000mg BID  -Continue prednisone 20mg daily  -Continue TMP/SMX and valganciclovir  -Follow up EMB biopsy result from 12/31

## 2020-01-02 NOTE — H&P
[facility]  Heart Transplant  H&P    Patient Name: Deborah Navas  MRN: [id number]  Admission Date: 1/2/2020  Attending Physician: Dr. Chaudhari  Primary Care Provider: Valeria Verma MD  Principal Problem:GLO (acute kidney injury)    Subjective:     History of Present Illness:  Ms. Navas is a 48yo F s/p OHTx on 12/16/2019 for NICM/ HFrEF with HM3 on 9/2019, removed at time of transplant, as well as AFl post LVAD s/p NADINE/DCCV, VT/VF (some a/w hypokalemia), HLD, obesity, and OA, who presented for worsening LEGER and abnormal labs. Patient was admitted on 11/13/2019 after multiple episodes of VT (not a candidate for VT ablation due to multifocal origin), as well as low flow alarms; during her stay she developed recurrent VT requiring ATP and shocks, she subsequently developed RV failure requiring pressor support and being unable to tolerate  due to VT, so she was discussed on selection committee for OTHx and underwent successful transplant on 12/16. Post-op course was uncomplicated except for a fluid collection in the R groin (seroma vs hematoma). She was discharged home on 12/30/2019 and on 12/31 she underwent RHC (RA 17, PCWP 15) with EMB, found to have likely stable pericardial effusion without tamponade physiology (likely still restrictive early post-transplant vs early post-op effuso-constrictive physiology). Of note, pathology of native heart revealed non-caseating granulomas typical of sarcoidosis.     Today patient presented for labs that revealed worsening creat from 1.7 to 2.4. She spoke with transplant coordinator and also reported some worsening LEGER and nausea, so decision was made to admit inpatient for further evaluation and management.  On my interview patient reports feeling okay but she has noted worsening LEGER since discharge, currently with SOB by taking few steps. She also reports nausea, no vomiting, with poor PO intake for the past 2 weeks. Reports non-pitting LE edema  since started on prednisone. Has had R groin pain in the area with the fluid collection, which she believes its gotten bigger. Also reports pain over her back and shoulders, for which she takes oxycodone PRN. Denies fever, chills, cough, PND, vomiting, diarrhea, abdominal pain, dysuria or change in urinary output. Compliant with medications.     Past Medical History:   Diagnosis Date    Fractures     History of ventricular fibrillation 9/4/2019    History of ventricular tachycardia 9/4/2019    Hyperlipidemia     Migraine     Osteoarthritis        Past Surgical History:   Procedure Laterality Date    BACK SURGERY      2007    BIOPSY WITH ULTRASOUND GUIDANCE N/A 12/31/2019    Procedure: BIOPSY, WITH US GUIDANCE;  Surgeon: Eric Antunez Jr., MD;  Location: [facility] CATH LAB;  Service: Cardiology;  Laterality: N/A;    BONE GRAFT Left     from Left hip to Left FA    eardrum reconstruction  1980    ELBOW SURGERY Left [id number]    FOREARM FRACTURE SURGERY Bilateral [id number]    multiple surgeries    HEART TRANSPLANT N/A 12/16/2019    Procedure: TRANSPLANT, HEART;  Surgeon: Talha Nuñez MD;  Location: [facility] OR [address] [place] [address];  Service: Cardiothoracic;  Laterality: N/A;    INSERTION OF GRAFT TO PERICARDIUM  9/10/2019    Procedure: INSERTION, GRAFT, PERICARDIUM;  Surgeon: Shar Walden MD;  Location: [facility] OR [address] [place] [address];  Service: Cardiovascular;;    INSERTION OF IMPLANTABLE CARDIOVERTER-DEFIBRILLATOR (ICD) GENERATOR WITH TWO EXISTING LEADS      INSERTION OF PACEMAKER Left     LEFT VENTRICULAR ASSIST DEVICE N/A 9/10/2019    Procedure: INSERTION-LEFT VENTRICULAR ASSIST DEVICE;  Surgeon: Shar Walden MD;  [facility];  Service: Cardiovascular;  Laterality: N/A;  DT HM3     LUMBAR FUSION  2007    L4-L5    RIGHT HEART CATHETERIZATION Right 9/4/2019    Procedure: INSERTION, CATHETER, RIGHT HEART;  Surgeon: Vi Bryant MD;  Location: [facility] CATH LAB;  Service: Cardiology;  Laterality: Right;     RIGHT HEART CATHETERIZATION N/A 11/18/2019    Procedure: INSERTION, CATHETER, RIGHT HEART;  Surgeon: Eric Antunez Jr., MD;  Location: [facility] CATH LAB;  Service: Cardiology;  Laterality: N/A;    RIGHT HEART CATHETERIZATION Right 12/31/2019    Procedure: INSERTION, CATHETER, RIGHT HEART;  Surgeon: Eric Antunez Jr., MD;  Location: [facility] CATH LAB;  Service: Cardiology;  Laterality: Right;    SINUS SURGERY Right 1994    with lymph nodes    STERNAL WOUND CLOSURE  9/10/2019    Procedure: CLOSURE, WOUND, STERNUM;  Surgeon: Shar Walden MD;  Location: [facility] OR [facility];  Service: Cardiovascular;;    TEMPOROMANDIBULAR JOINT SURGERY Right 1988    TONSILLECTOMY      TREATMENT OF CARDIAC ARRHYTHMIA N/A 9/24/2019    Procedure: CARDIOVERSION;  Surgeon: Moe Barrera MD;  Location: [facility] EP LAB;  Service: Cardiology;  Laterality: N/A;  AF, DCCV/NADINE, anes, DM, Rm 3073    TYMPANOSTOMY TUBE PLACEMENT  1971- 1979    multiple tube placements       Review of patient's allergies indicates:   Allergen Reactions    Adhesive Blisters     Reaction to area in chest and up only    Codeine Itching       Current Facility-Administered Medications   Medication    acetaminophen tablet 650 mg    aspirin EC tablet 81 mg    atorvastatin tablet 20 mg    calcium-vitamin D3 500 mg(1,250mg) -200 unit per tablet 2 tablet    famotidine tablet 20 mg    ferrous sulfate EC tablet 325 mg    gabapentin capsule 300 mg    heparin (porcine) injection 5,000 Units    magnesium oxide tablet 400 mg    mirtazapine tablet 15 mg    mycophenolate capsule 1,000 mg    nystatin 100,000 unit/mL suspension 500,000 Units    oxyCODONE immediate release tablet Tab 10 mg    predniSONE tablet 20 mg    promethazine tablet 25 mg    senna-docusate 8.6-50 mg per tablet 2 tablet    SITagliptin tablet 50 mg    sodium chloride 0.9% flush 10 mL    sulfamethoxazole-trimethoprim 400-80mg per tablet 1 tablet    [START ON 1/3/2020] tacrolimus capsule 3  mg    tacrolimus capsule 4 mg    [START ON 1/3/2020] theophylline 24 hr capsule 400 mg    valGANciclovir tablet 450 mg    venlafaxine 24 hr capsule 150 mg    zolpidem tablet 5 mg     Family History     Problem Relation (Age of Onset)    Broken bones Maternal Grandmother    Cancer Paternal Grandmother    Dislocations Maternal Grandmother    Heart failure Paternal Grandfather, Maternal Grandfather    Migraines Mother, Maternal Grandmother, Paternal Grandmother, Paternal Grandfather, Maternal Grandfather    Obesity Paternal Grandmother    Osteoarthritis Mother, Maternal Grandmother, Paternal Grandmother, Paternal Grandfather, Maternal Grandfather, Father    Osteoporosis Maternal Grandmother    Scoliosis Maternal Grandmother    Stroke Father        Tobacco Use    Smoking status: Never Smoker    Smokeless tobacco: Never Used   Substance and Sexual Activity    Alcohol use: No    Drug use: No    Sexual activity: Not Currently     Review of Systems   All other systems reviewed and are negative.    Objective:     Vital Signs (Most Recent):  Temp: 98.4 °F (36.9 °C) (01/02/20 1201)  Pulse: 94 (01/02/20 1130)  Resp: 16 (01/02/20 1130)  BP: 111/71 (01/02/20 1130)  SpO2: (!) 94 % (01/02/20 1130) Vital Signs (24h Range):  Temp:  [98.4 °F (36.9 °C)] 98.4 °F (36.9 °C)  Pulse:  [94] 94  Resp:  [16] 16  SpO2:  [94 %] 94 %  BP: (111)/(71) 111/71     Patient Vitals for the past 72 hrs (Last 3 readings):   Weight   01/02/20 1201 106.1 kg (234 lb)   01/02/20 1130 106.4 kg (234 lb 9.1 oz)     Body mass index is 35.58 kg/m².    No intake or output data in the 24 hours ending 01/02/20 1639    Physical Exam   Constitutional: She appears well-developed and well-nourished. No distress.   HENT:   Head: Normocephalic.   Eyes: Pupils are equal, round, and reactive to light. EOM are normal.   Neck:   Thick neck, unable to appreciate JVP, prominent EJ   Cardiovascular: Normal rate, regular rhythm and normal heart sounds.   No murmur  heard.  Chest with midline surgical wound closed, no erythema or discharge.    Pulmonary/Chest: Effort normal. No respiratory distress.   Decreased BS bibasilar   Abdominal: Soft. She exhibits no distension. There is no tenderness.   Musculoskeletal:   Trace LE edema. R groin with clean closed wound, no discharge or erythema, small collection palpated, mildly tender. Mild hand tremors  Skin: Skin is warm.       Significant Labs:  CBC:  Recent Labs   Lab 12/29/19  0643 12/30/19  0633 12/31/19  0730   WBC 14.03* 12.69 12.13   RBC 3.07* 3.08* 2.95*   HGB 8.3* 8.3* 8.0*   HCT 28.4* 28.5* 27.1*   * 381* 423*   MCV 93 93 92   MCH 27.0 26.9* 27.1   MCHC 29.2* 29.1* 29.5*     BNP:  Recent Labs   Lab 12/30/19  1017 12/31/19  0730 01/02/20  0835   * 510* 594*     CMP:  Recent Labs   Lab 12/28/19  0630  12/30/19  0633 12/31/19  0730 01/02/20  0835   *   < > 168* 147* 177*   CALCIUM 8.7   < > 9.0 9.1 9.2   ALBUMIN 2.8*  --  2.8* 2.9*  --    PROT 5.9*  --  6.1 6.0  --       < > 137 140 140   K 4.6   < > 3.9 4.0 4.2   CO2 30*   < > 29 28 32*   CL 99   < > 97 99 96   BUN 37*   < > 30* 30* 35*   CREATININE 1.6*   < > 1.4 1.7* 2.4*   ALKPHOS 80  --  94 84  --    ALT 22  --  25 20  --    AST 18  --  18 10  --    BILITOT 0.5  --  0.6 0.8  --     < > = values in this interval not displayed.      Coagulation:   No results for input(s): PT, INR, APTT in the last 168 hours.  LDH:  No results for input(s): LDH in the last 72 hours.  Microbiology:  Microbiology Results (last 7 days)     ** No results found for the last 168 hours. **          I have reviewed all pertinent labs within the past 24 hours.        Assessment/Plan:     50yo F s/p OHTx on 12/16/2019 for NICM/ HFrEF with HM3 on 9/2019, removed at time of transplant, as well as AFl post LVAD s/p NADINE/DCCV, VT/VF (some a/w hypokalemia), HLD, obesity, and OA, who presented for worsening LEGER and GLO. RHC on 12/31 with elevated filling pressures (RA 17, PCWP  15). Pathology of native heart revealed non-caseating granulomas typical of sarcoidosis. Presentation is likely c/w cardiorenal syndrome in the setting of ADHF/ fluid overload.     Acute on chronic diastolic heart failure- NYHA III, hypervolemic   S/p OHTx on 12/16 for NICM, path revealed sarcoidosis. Now presenting with worsening LEGER, nausea and GLO. RHC on 12/31 with elevated filling pressures (RA 17, PCWP 15).  - RIJ line placed, initial CVP 14  - TTE revealed LVEF 65% and normal RV function, mild MR, mod-sev TR, IVC not seen; no pericardial effusion appreciated  - Will start furosemide 80mg IV BID  - Start dobutamine 2.5mcg/kg/min  - Monitor CVP  - Obtain central vein O2sat  - Monitor UOP, daily weight  - Monitor electrolytes and replete for target K>4 and Mg>2    Status post heart transplant  - Tacrolimus level slightly elevated at 11--> will hold tonight and recheck  - Continue mycophenolate 1000mg BID  - Continue prednisone 20mg daily  - Continue TMP/SMX and valganciclovir  - Follow up EMB biopsy result from 12/31  - Path of native heart revealed sarcoidosis, will need close f/u and possible chest/ extracardiac imaging    * GLO (acute kidney injury)  Likely cardiorenal syndrome with renal venous congestion  - IV diuresis and inotropes as above  - Monitor renal function and electrolytes  - Renally dose meds  - Avoid nephrotoxins  - UA pending    * R groin fluid collection- seroma vs hematoma      - Repeat R groin US to eval change in size and need for intervention    * Chronic pain      - Continue gabapentin      - Continue Tylenol and oxycodone PRN    * Miscellaneous      - Diet: Cardiac      - DVT ppx: Heparin SQ      - Code status: Full code      Triny Yusuf MD  Heart Transplant  [facility]

## 2020-01-02 NOTE — SUBJECTIVE & OBJECTIVE
Today patient presented for labs that revealed worsening creat from 1.7 to 2.4. She spoke with transplant coordinator and also reported some worsening LEGER and nausea, so decision was made to admit inpatient for further evaluation and management.  On my interview patient reports feeling okay but she has noted worsening LEGER since discharge, currently with SOB by taking few steps. She also reports nausea, no vomiting, with poor PO intake for the past 2 weeks. Reports non-pitting LE edema since started on prednisone. Has had R groin pain in the area with the fluid collection, which she believes its gotten bigger. Also reports pain over her back and shoulders, for which she takes oxycodone PRN. Denies fever, chills, cough, PND, vomiting, diarrhea, abdominal pain, dysuria or change in urinary output. Compliant with medications.     Past Medical History:   Diagnosis Date    Fractures     History of ventricular fibrillation 9/4/2019    History of ventricular tachycardia 9/4/2019    Hyperlipidemia     Migraine     Osteoarthritis        Past Surgical History:   Procedure Laterality Date    BACK SURGERY      2007    BIOPSY WITH ULTRASOUND GUIDANCE N/A 12/31/2019    Procedure: BIOPSY, WITH US GUIDANCE;  Surgeon: Eric Antunez Jr., MD;  Location: [facility] CATH LAB;  Service: Cardiology;  Laterality: N/A;    BONE GRAFT Left     from Left hip to Left FA    eardrum reconstruction  1980    ELBOW SURGERY Left [id number]    FOREARM FRACTURE SURGERY Bilateral [id number]    multiple surgeries    HEART TRANSPLANT N/A 12/16/2019    Procedure: TRANSPLANT, HEART;  Surgeon: Talha Nuñez MD;  Location: [facility] OR [address] [place] [address];  Service: Cardiothoracic;  Laterality: N/A;    INSERTION OF GRAFT TO PERICARDIUM  9/10/2019    Procedure: INSERTION, GRAFT, PERICARDIUM;  Surgeon: Shar Walden MD;  Location: [facility] OR [address] [place] [address];  Service: Cardiovascular;;    INSERTION OF IMPLANTABLE CARDIOVERTER-DEFIBRILLATOR (ICD) GENERATOR WITH TWO  EXISTING LEADS      INSERTION OF PACEMAKER Left     LEFT VENTRICULAR ASSIST DEVICE N/A 9/10/2019    Procedure: INSERTION-LEFT VENTRICULAR ASSIST DEVICE;  Surgeon: Shar Walden MD;  [facility];  Service: Cardiovascular;  Laterality: N/A;  DT HM3     LUMBAR FUSION  2007    L4-L5    RIGHT HEART CATHETERIZATION Right 9/4/2019    Procedure: INSERTION, CATHETER, RIGHT HEART;  Surgeon: Vi rByant MD;  Location: [facility] CATH LAB;  Service: Cardiology;  Laterality: Right;    RIGHT HEART CATHETERIZATION N/A 11/18/2019    Procedure: INSERTION, CATHETER, RIGHT HEART;  Surgeon: Eric Antunez Jr., MD;  Location: [facility] CATH LAB;  Service: Cardiology;  Laterality: N/A;    RIGHT HEART CATHETERIZATION Right 12/31/2019    Procedure: INSERTION, CATHETER, RIGHT HEART;  Surgeon: Eric Antunez Jr., MD;  Location: [facility] CATH LAB;  Service: Cardiology;  Laterality: Right;    SINUS SURGERY Right 1994    with lymph nodes    STERNAL WOUND CLOSURE  9/10/2019    Procedure: CLOSURE, WOUND, STERNUM;  Surgeon: Shar Walden MD;  [facility];  Service: Cardiovascular;;    TEMPOROMANDIBULAR JOINT SURGERY Right 1988    TONSILLECTOMY      TREATMENT OF CARDIAC ARRHYTHMIA N/A 9/24/2019    Procedure: CARDIOVERSION;  Surgeon: Moe Barrera MD;  Location: [facility] EP LAB;  Service: Cardiology;  Laterality: N/A;  AF, DCCV/NADINE, anes, DM, Rm 3073    TYMPANOSTOMY TUBE PLACEMENT  1971- 1979    multiple tube placements       Review of patient's allergies indicates:   Allergen Reactions    Adhesive Blisters     Reaction to area in chest and up only    Codeine Itching       Current Facility-Administered Medications   Medication    acetaminophen tablet 650 mg    aspirin EC tablet 81 mg    atorvastatin tablet 20 mg    calcium-vitamin D3 500 mg(1,250mg) -200 unit per tablet 2 tablet    famotidine tablet 20 mg    ferrous sulfate EC tablet 325 mg    gabapentin capsule 300 mg    heparin (porcine)  injection 5,000 Units    magnesium oxide tablet 400 mg    mirtazapine tablet 15 mg    mycophenolate capsule 1,000 mg    nystatin 100,000 unit/mL suspension 500,000 Units    oxyCODONE immediate release tablet Tab 10 mg    predniSONE tablet 20 mg    promethazine tablet 25 mg    senna-docusate 8.6-50 mg per tablet 2 tablet    SITagliptin tablet 50 mg    sodium chloride 0.9% flush 10 mL    sulfamethoxazole-trimethoprim 400-80mg per tablet 1 tablet    [START ON 1/3/2020] tacrolimus capsule 3 mg    tacrolimus capsule 4 mg    [START ON 1/3/2020] theophylline 24 hr capsule 400 mg    valGANciclovir tablet 450 mg    venlafaxine 24 hr capsule 150 mg    zolpidem tablet 5 mg     Family History     Problem Relation (Age of Onset)    Broken bones Maternal Grandmother    Cancer Paternal Grandmother    Dislocations Maternal Grandmother    Heart failure Paternal Grandfather, Maternal Grandfather    Migraines Mother, Maternal Grandmother, Paternal Grandmother, Paternal Grandfather, Maternal Grandfather    Obesity Paternal Grandmother    Osteoarthritis Mother, Maternal Grandmother, Paternal Grandmother, Paternal Grandfather, Maternal Grandfather, Father    Osteoporosis Maternal Grandmother    Scoliosis Maternal Grandmother    Stroke Father        Tobacco Use    Smoking status: Never Smoker    Smokeless tobacco: Never Used   Substance and Sexual Activity    Alcohol use: No    Drug use: No    Sexual activity: Not Currently     Review of Systems   All other systems reviewed and are negative.    Objective:     Vital Signs (Most Recent):  Temp: 98.4 °F (36.9 °C) (01/02/20 1201)  Pulse: 94 (01/02/20 1130)  Resp: 16 (01/02/20 1130)  BP: 111/71 (01/02/20 1130)  SpO2: (!) 94 % (01/02/20 1130) Vital Signs (24h Range):  Temp:  [98.4 °F (36.9 °C)] 98.4 °F (36.9 °C)  Pulse:  [94] 94  Resp:  [16] 16  SpO2:  [94 %] 94 %  BP: (111)/(71) 111/71     Patient Vitals for the past 72 hrs (Last 3 readings):   Weight   01/02/20 1201  106.1 kg (234 lb)   01/02/20 1130 106.4 kg (234 lb 9.1 oz)     Body mass index is 35.58 kg/m².    No intake or output data in the 24 hours ending 01/02/20 1639    Physical Exam   Constitutional: She appears well-developed and well-nourished. No distress.   HENT:   Head: Normocephalic.   Eyes: Pupils are equal, round, and reactive to light. EOM are normal.   Neck:   Thick neck, unable to appreciate JVP, prominent EJ   Cardiovascular: Normal rate, regular rhythm and normal heart sounds.   No murmur heard.  Chest with midline surgical wound closed, no erythema or discharge.    Pulmonary/Chest: Effort normal. No respiratory distress.   Decreased BC bibasilar   Abdominal: Soft. She exhibits no distension. There is no tenderness.   Musculoskeletal:   Trace LE edema. R groin with clean closed wound, no discharge or erythema, small collection palpated, mildly tender   Skin: Skin is warm.       Significant Labs:  CBC:  Recent Labs   Lab 12/29/19  0643 12/30/19  0633 12/31/19  0730   WBC 14.03* 12.69 12.13   RBC 3.07* 3.08* 2.95*   HGB 8.3* 8.3* 8.0*   HCT 28.4* 28.5* 27.1*   * 381* 423*   MCV 93 93 92   MCH 27.0 26.9* 27.1   MCHC 29.2* 29.1* 29.5*     BNP:  Recent Labs   Lab 12/30/19  1017 12/31/19  0730 01/02/20  0835   * 510* 594*     CMP:  Recent Labs   Lab 12/28/19  0630  12/30/19  0633 12/31/19  0730 01/02/20  0835   *   < > 168* 147* 177*   CALCIUM 8.7   < > 9.0 9.1 9.2   ALBUMIN 2.8*  --  2.8* 2.9*  --    PROT 5.9*  --  6.1 6.0  --       < > 137 140 140   K 4.6   < > 3.9 4.0 4.2   CO2 30*   < > 29 28 32*   CL 99   < > 97 99 96   BUN 37*   < > 30* 30* 35*   CREATININE 1.6*   < > 1.4 1.7* 2.4*   ALKPHOS 80  --  94 84  --    ALT 22  --  25 20  --    AST 18  --  18 10  --    BILITOT 0.5  --  0.6 0.8  --     < > = values in this interval not displayed.      Coagulation:   No results for input(s): PT, INR, APTT in the last 168 hours.  LDH:  No results for input(s): LDH in the last 72  hours.  Microbiology:  Microbiology Results (last 7 days)     ** No results found for the last 168 hours. **          I have reviewed all pertinent labs within the past 24 hours.

## 2020-01-02 NOTE — HPI
48yo F s/p OHTx on 12/16/2019 for NICM/ HFrEF with HM3 on 9/2019, removed at time of transplant, as well as AFl post LVAD s/p NADINE/DCCV, VT/VF (some a/w hypokalemia), HLD, obesity, and OA, who presented for worsening LEGER and GLO. RHC on 12/31 with elevated filling pressures (RA 17, PCWP 15). Pathology of native heart revealed non-caseating granulomas typical of sarcoidosis. Presentation was likely c/w cardiorenal syndrome in the setting of ADHF/ fluid overload plus side effect from tacrolimus.

## 2020-01-02 NOTE — ASSESSMENT & PLAN NOTE
S/p OHTx on 12/16 for NICM, path revealed sarcoidosis. Now presenting with worsening LEGER, nausea and GLO. RHC on 12/31 with elevated filling pressures (RA 17, PCWP 15).  -RIJ line placed, initial CVP 14  -TTE revealed LVEF 6% and normal RV function, mild MR, mod-sev TR, IVC not seen  -Will start furosemide 80mg IV BID  -Start dobutamine 2.5mcg/kg/min  -Monitor CVP  -Obtain central vein O2sat  -Monitor UOP, daily weight  -Monitor electrolytes and replete for target K>4 and Mg>2

## 2020-01-02 NOTE — ASSESSMENT & PLAN NOTE
Likely cardiorenal syndrome with renal venous congestion  -IV diuresis and inotropes as above  -Monitor renal function and electrolytes  -Renally dose meds  -Avoid nephrotoxins

## 2020-01-02 NOTE — Clinical Note
Prepped: right neck. Prepped with: ChloraPrep. The patient was draped. [facility] sterile drape.

## 2020-01-03 LAB
ABO + RH BLD: NORMAL
ALBUMIN SERPL BCP-MCNC: 2.4 G/DL (ref 3.5–5.2)
ALBUMIN SERPL BCP-MCNC: 2.5 G/DL (ref 3.5–5.2)
ALP SERPL-CCNC: 80 U/L (ref 55–135)
ALP SERPL-CCNC: 82 U/L (ref 55–135)
ALT SERPL W/O P-5'-P-CCNC: 12 U/L (ref 10–44)
ALT SERPL W/O P-5'-P-CCNC: 12 U/L (ref 10–44)
ANION GAP SERPL CALC-SCNC: 11 MMOL/L (ref 8–16)
ANION GAP SERPL CALC-SCNC: 11 MMOL/L (ref 8–16)
AST SERPL-CCNC: 10 U/L (ref 10–40)
AST SERPL-CCNC: 9 U/L (ref 10–40)
BASOPHILS # BLD AUTO: 0 K/UL (ref 0–0.2)
BASOPHILS # BLD AUTO: 0.01 K/UL (ref 0–0.2)
BASOPHILS NFR BLD: 0 % (ref 0–1.9)
BASOPHILS NFR BLD: 0.1 % (ref 0–1.9)
BILIRUB SERPL-MCNC: 0.4 MG/DL (ref 0.1–1)
BILIRUB SERPL-MCNC: 0.5 MG/DL (ref 0.1–1)
BLD GP AB SCN CELLS X3 SERPL QL: NORMAL
BUN SERPL-MCNC: 36 MG/DL (ref 6–20)
BUN SERPL-MCNC: 40 MG/DL (ref 6–20)
CALCIUM SERPL-MCNC: 8.3 MG/DL (ref 8.7–10.5)
CALCIUM SERPL-MCNC: 8.5 MG/DL (ref 8.7–10.5)
CHLORIDE SERPL-SCNC: 93 MMOL/L (ref 95–110)
CHLORIDE SERPL-SCNC: 95 MMOL/L (ref 95–110)
CO2 SERPL-SCNC: 30 MMOL/L (ref 23–29)
CO2 SERPL-SCNC: 30 MMOL/L (ref 23–29)
CREAT SERPL-MCNC: 2.6 MG/DL (ref 0.5–1.4)
CREAT SERPL-MCNC: 3.2 MG/DL (ref 0.5–1.4)
DIFFERENTIAL METHOD: ABNORMAL
DIFFERENTIAL METHOD: ABNORMAL
EOSINOPHIL # BLD AUTO: 0 K/UL (ref 0–0.5)
EOSINOPHIL # BLD AUTO: 0 K/UL (ref 0–0.5)
EOSINOPHIL NFR BLD: 0 % (ref 0–8)
EOSINOPHIL NFR BLD: 0.2 % (ref 0–8)
ERYTHROCYTE [DISTWIDTH] IN BLOOD BY AUTOMATED COUNT: 16.4 % (ref 11.5–14.5)
ERYTHROCYTE [DISTWIDTH] IN BLOOD BY AUTOMATED COUNT: 16.6 % (ref 11.5–14.5)
EST. GFR  (AFRICAN AMERICAN): 18.6 ML/MIN/1.73 M^2
EST. GFR  (AFRICAN AMERICAN): 23.9 ML/MIN/1.73 M^2
EST. GFR  (NON AFRICAN AMERICAN): 16.1 ML/MIN/1.73 M^2
EST. GFR  (NON AFRICAN AMERICAN): 20.7 ML/MIN/1.73 M^2
FERRITIN SERPL-MCNC: 288 NG/ML (ref 20–300)
FINAL PATHOLOGIC DIAGNOSIS: NORMAL
GLUCOSE SERPL-MCNC: 104 MG/DL (ref 70–110)
GLUCOSE SERPL-MCNC: 231 MG/DL (ref 70–110)
GROSS: NORMAL
HCT VFR BLD AUTO: 21.3 % (ref 37–48.5)
HCT VFR BLD AUTO: 22.1 % (ref 37–48.5)
HGB BLD-MCNC: 6.5 G/DL (ref 12–16)
HGB BLD-MCNC: 6.7 G/DL (ref 12–16)
IMM GRANULOCYTES # BLD AUTO: 0.07 K/UL (ref 0–0.04)
IMM GRANULOCYTES # BLD AUTO: 0.07 K/UL (ref 0–0.04)
IMM GRANULOCYTES NFR BLD AUTO: 0.8 % (ref 0–0.5)
IMM GRANULOCYTES NFR BLD AUTO: 0.9 % (ref 0–0.5)
IRON SERPL-MCNC: 15 UG/DL (ref 30–160)
LYMPHOCYTES # BLD AUTO: 0.1 K/UL (ref 1–4.8)
LYMPHOCYTES # BLD AUTO: 0.1 K/UL (ref 1–4.8)
LYMPHOCYTES NFR BLD: 1 % (ref 18–48)
LYMPHOCYTES NFR BLD: 1.1 % (ref 18–48)
MAGNESIUM SERPL-MCNC: 3 MG/DL (ref 1.6–2.6)
MCH RBC QN AUTO: 27 PG (ref 27–31)
MCH RBC QN AUTO: 27 PG (ref 27–31)
MCHC RBC AUTO-ENTMCNC: 30.3 G/DL (ref 32–36)
MCHC RBC AUTO-ENTMCNC: 30.5 G/DL (ref 32–36)
MCV RBC AUTO: 88 FL (ref 82–98)
MCV RBC AUTO: 89 FL (ref 82–98)
MICROSCOPIC EXAM: NORMAL
MONOCYTES # BLD AUTO: 0.2 K/UL (ref 0.3–1)
MONOCYTES # BLD AUTO: 0.3 K/UL (ref 0.3–1)
MONOCYTES NFR BLD: 2.8 % (ref 4–15)
MONOCYTES NFR BLD: 3.7 % (ref 4–15)
NEUTROPHILS # BLD AUTO: 7.2 K/UL (ref 1.8–7.7)
NEUTROPHILS # BLD AUTO: 8.4 K/UL (ref 1.8–7.7)
NEUTROPHILS NFR BLD: 94.2 % (ref 38–73)
NEUTROPHILS NFR BLD: 95.2 % (ref 38–73)
NRBC BLD-RTO: 0 /100 WBC
NRBC BLD-RTO: 0 /100 WBC
PLATELET # BLD AUTO: 303 K/UL (ref 150–350)
PLATELET # BLD AUTO: 322 K/UL (ref 150–350)
PMV BLD AUTO: 9.1 FL (ref 9.2–12.9)
PMV BLD AUTO: 9.4 FL (ref 9.2–12.9)
POTASSIUM SERPL-SCNC: 4 MMOL/L (ref 3.5–5.1)
POTASSIUM SERPL-SCNC: 4.4 MMOL/L (ref 3.5–5.1)
PROT SERPL-MCNC: 5.4 G/DL (ref 6–8.4)
PROT SERPL-MCNC: 5.6 G/DL (ref 6–8.4)
RBC # BLD AUTO: 2.41 M/UL (ref 4–5.4)
RBC # BLD AUTO: 2.48 M/UL (ref 4–5.4)
SATURATED IRON: 6 % (ref 20–50)
SODIUM SERPL-SCNC: 134 MMOL/L (ref 136–145)
SODIUM SERPL-SCNC: 136 MMOL/L (ref 136–145)
TACROLIMUS BLD-MCNC: 9.3 NG/ML (ref 5–15)
TOTAL IRON BINDING CAPACITY: 258 UG/DL (ref 250–450)
TRANSFERRIN SERPL-MCNC: 174 MG/DL (ref 200–375)
WBC # BLD AUTO: 7.57 K/UL (ref 3.9–12.7)
WBC # BLD AUTO: 8.93 K/UL (ref 3.9–12.7)

## 2020-01-03 PROCEDURE — 63600175 PHARM REV CODE 636 W HCPCS: Performed by: INTERNAL MEDICINE

## 2020-01-03 PROCEDURE — 82728 ASSAY OF FERRITIN: CPT

## 2020-01-03 PROCEDURE — 83735 ASSAY OF MAGNESIUM: CPT

## 2020-01-03 PROCEDURE — 80053 COMPREHEN METABOLIC PANEL: CPT | Mod: 91

## 2020-01-03 PROCEDURE — 25000003 PHARM REV CODE 250: Performed by: STUDENT IN AN ORGANIZED HEALTH CARE EDUCATION/TRAINING PROGRAM

## 2020-01-03 PROCEDURE — 80197 ASSAY OF TACROLIMUS: CPT

## 2020-01-03 PROCEDURE — 83540 ASSAY OF IRON: CPT

## 2020-01-03 PROCEDURE — 63600175 PHARM REV CODE 636 W HCPCS: Performed by: STUDENT IN AN ORGANIZED HEALTH CARE EDUCATION/TRAINING PROGRAM

## 2020-01-03 PROCEDURE — 86850 RBC ANTIBODY SCREEN: CPT

## 2020-01-03 PROCEDURE — 93005 ELECTROCARDIOGRAM TRACING: CPT

## 2020-01-03 PROCEDURE — 85025 COMPLETE CBC W/AUTO DIFF WBC: CPT

## 2020-01-03 PROCEDURE — 25000003 PHARM REV CODE 250: Performed by: INTERNAL MEDICINE

## 2020-01-03 PROCEDURE — 93010 ELECTROCARDIOGRAM REPORT: CPT | Mod: ,,, | Performed by: INTERNAL MEDICINE

## 2020-01-03 PROCEDURE — 86920 COMPATIBILITY TEST SPIN: CPT

## 2020-01-03 PROCEDURE — 80053 COMPREHEN METABOLIC PANEL: CPT

## 2020-01-03 PROCEDURE — 20600001 HC STEP DOWN PRIVATE ROOM

## 2020-01-03 PROCEDURE — 36415 COLL VENOUS BLD VENIPUNCTURE: CPT

## 2020-01-03 PROCEDURE — 93010 EKG 12-LEAD: ICD-10-PCS | Mod: ,,, | Performed by: INTERNAL MEDICINE

## 2020-01-03 RX ORDER — MIRTAZAPINE 15 MG/1
30 TABLET, FILM COATED ORAL NIGHTLY
Status: DISCONTINUED | OUTPATIENT
Start: 2020-01-03 | End: 2020-01-10 | Stop reason: HOSPADM

## 2020-01-03 RX ORDER — TACROLIMUS 1 MG/1
2 CAPSULE ORAL 2 TIMES DAILY
Status: DISCONTINUED | OUTPATIENT
Start: 2020-01-03 | End: 2020-01-03

## 2020-01-03 RX ORDER — SULFAMETHOXAZOLE AND TRIMETHOPRIM 400; 80 MG/1; MG/1
1 TABLET ORAL
Status: DISCONTINUED | OUTPATIENT
Start: 2020-01-06 | End: 2020-01-10 | Stop reason: HOSPADM

## 2020-01-03 RX ORDER — HEPARIN SODIUM 5000 [USP'U]/ML
5000 INJECTION, SOLUTION INTRAVENOUS; SUBCUTANEOUS EVERY 12 HOURS
Status: DISCONTINUED | OUTPATIENT
Start: 2020-01-03 | End: 2020-01-10 | Stop reason: HOSPADM

## 2020-01-03 RX ADMIN — VALGANCICLOVIR 450 MG: 450 TABLET, FILM COATED ORAL at 08:01

## 2020-01-03 RX ADMIN — ATORVASTATIN CALCIUM 20 MG: 20 TABLET, FILM COATED ORAL at 08:01

## 2020-01-03 RX ADMIN — TACROLIMUS 2 MG: 1 CAPSULE ORAL at 05:01

## 2020-01-03 RX ADMIN — THEOPHYLLINE ANHYDROUS 400 MG: 100 CAPSULE, EXTENDED RELEASE ORAL at 08:01

## 2020-01-03 RX ADMIN — MYCOPHENOLATE MOFETIL 1000 MG: 250 CAPSULE ORAL at 08:01

## 2020-01-03 RX ADMIN — SULFAMETHOXAZOLE AND TRIMETHOPRIM 1 TABLET: 400; 80 TABLET ORAL at 08:01

## 2020-01-03 RX ADMIN — MIRTAZAPINE 30 MG: 15 TABLET, FILM COATED ORAL at 10:01

## 2020-01-03 RX ADMIN — ASPIRIN 81 MG: 81 TABLET, COATED ORAL at 08:01

## 2020-01-03 RX ADMIN — FERROUS SULFATE TAB EC 325 MG (65 MG FE EQUIVALENT) 325 MG: 325 (65 FE) TABLET DELAYED RESPONSE at 08:01

## 2020-01-03 RX ADMIN — NYSTATIN 500000 UNITS: 100000 SUSPENSION ORAL at 05:01

## 2020-01-03 RX ADMIN — OXYCODONE HYDROCHLORIDE 10 MG: 10 TABLET ORAL at 08:01

## 2020-01-03 RX ADMIN — DOBUTAMINE IN DEXTROSE 5 MCG/KG/MIN: 200 INJECTION, SOLUTION INTRAVENOUS at 05:01

## 2020-01-03 RX ADMIN — HEPARIN SODIUM 5000 UNITS: 5000 INJECTION, SOLUTION INTRAVENOUS; SUBCUTANEOUS at 10:01

## 2020-01-03 RX ADMIN — NYSTATIN 500000 UNITS: 100000 SUSPENSION ORAL at 11:01

## 2020-01-03 RX ADMIN — Medication 400 MG: at 08:01

## 2020-01-03 RX ADMIN — HEPARIN SODIUM 5000 UNITS: 5000 INJECTION, SOLUTION INTRAVENOUS; SUBCUTANEOUS at 06:01

## 2020-01-03 RX ADMIN — GABAPENTIN 300 MG: 300 CAPSULE ORAL at 10:01

## 2020-01-03 RX ADMIN — FERROUS SULFATE TAB EC 325 MG (65 MG FE EQUIVALENT) 325 MG: 325 (65 FE) TABLET DELAYED RESPONSE at 10:01

## 2020-01-03 RX ADMIN — NYSTATIN 500000 UNITS: 100000 SUSPENSION ORAL at 10:01

## 2020-01-03 RX ADMIN — FUROSEMIDE 80 MG: 10 INJECTION, SOLUTION INTRAMUSCULAR; INTRAVENOUS at 08:01

## 2020-01-03 RX ADMIN — MYCOPHENOLATE MOFETIL 1000 MG: 250 CAPSULE ORAL at 10:01

## 2020-01-03 RX ADMIN — TACROLIMUS 2 MG: 1 CAPSULE ORAL at 10:01

## 2020-01-03 RX ADMIN — FAMOTIDINE 20 MG: 20 TABLET ORAL at 08:01

## 2020-01-03 RX ADMIN — SITAGLIPTIN 50 MG: 50 TABLET, FILM COATED ORAL at 08:01

## 2020-01-03 RX ADMIN — OYSTER SHELL CALCIUM WITH VITAMIN D 2 TABLET: 500; 200 TABLET, FILM COATED ORAL at 08:01

## 2020-01-03 RX ADMIN — VENLAFAXINE HYDROCHLORIDE 150 MG: 37.5 CAPSULE, EXTENDED RELEASE ORAL at 08:01

## 2020-01-03 RX ADMIN — FUROSEMIDE 10 MG/HR: 10 INJECTION, SOLUTION INTRAMUSCULAR; INTRAVENOUS at 11:01

## 2020-01-03 RX ADMIN — NYSTATIN 500000 UNITS: 100000 SUSPENSION ORAL at 08:01

## 2020-01-03 RX ADMIN — PROMETHAZINE HYDROCHLORIDE 25 MG: 25 TABLET ORAL at 06:01

## 2020-01-03 RX ADMIN — FUROSEMIDE 80 MG: 10 INJECTION, SOLUTION INTRAMUSCULAR; INTRAVENOUS at 05:01

## 2020-01-03 RX ADMIN — PREDNISONE 20 MG: 10 TABLET ORAL at 08:01

## 2020-01-03 NOTE — PLAN OF CARE
Pt admitted due to elevated creatinine and elevated rhc #s. Placed central line. Cvp=14, will start dobutamine and give IV Lasix. Vbg done. Pt complains of nausea which is relieved with phenergan. Pt had 2 episodes of diarrhea. Pt admits to taking milk of magnesia prior to arriving to floor. Pt had right ultrasound of groin due to redness and swelling, results pending.

## 2020-01-03 NOTE — PROGRESS NOTES
[facility]  Heart Transplant  Progress Note    Patient Name: Deborah Navas  MRN: [id number]  Admission Date: 1/2/2020  Hospital Length of Stay: 1 days  Attending Physician: Miriam Prieto MD  Primary Care Provider: Valeria Verma MD  Principal Problem:GLO (acute kidney injury)    Subjective:     Interval events:  Patient was seen and examined this morning at bedside. Yesterday she was started on IV furosemide and low dose  but she didn't have good UOP. No acute overnight events. This morning she reports feeling okay, currently not SOB but hasn't really gotten out of bed.     Past Medical History:   Diagnosis Date    Fractures     History of ventricular fibrillation 9/4/2019    History of ventricular tachycardia 9/4/2019    Hyperlipidemia     Migraine     Osteoarthritis        Past Surgical History:   Procedure Laterality Date    BACK SURGERY      2007    BIOPSY WITH ULTRASOUND GUIDANCE N/A 12/31/2019    Procedure: BIOPSY, WITH US GUIDANCE;  Surgeon: Eric Antunez Jr., MD;  Location: [facility] CATH LAB;  Service: Cardiology;  Laterality: N/A;    BONE GRAFT Left     from Left hip to Left FA    eardrum reconstruction  1980    ELBOW SURGERY Left [id number]    FOREARM FRACTURE SURGERY Bilateral [id number]    multiple surgeries    HEART TRANSPLANT N/A 12/16/2019    Procedure: TRANSPLANT, HEART;  Surgeon: Talha Nuñez MD;  Location: [facility] OR [address] [place] [address];  Service: Cardiothoracic;  Laterality: N/A;    INSERTION OF GRAFT TO PERICARDIUM  9/10/2019    Procedure: INSERTION, GRAFT, PERICARDIUM;  Surgeon: Shar Walden MD;  Location: [facility] OR [address] [place] [address];  Service: Cardiovascular;;    INSERTION OF IMPLANTABLE CARDIOVERTER-DEFIBRILLATOR (ICD) GENERATOR WITH TWO EXISTING LEADS      INSERTION OF PACEMAKER Left     LEFT VENTRICULAR ASSIST DEVICE N/A 9/10/2019    Procedure: INSERTION-LEFT VENTRICULAR ASSIST DEVICE;  Surgeon: Shar Walden MD;  Location: [facility] OR [address] [place] [address];  Service:  Cardiovascular;  Laterality: N/A;  DT HM3     LUMBAR FUSION  2007    L4-L5    RIGHT HEART CATHETERIZATION Right 9/4/2019    Procedure: INSERTION, CATHETER, RIGHT HEART;  Surgeon: Vi Bryant MD;  Location: [facility] CATH LAB;  Service: Cardiology;  Laterality: Right;    RIGHT HEART CATHETERIZATION N/A 11/18/2019    Procedure: INSERTION, CATHETER, RIGHT HEART;  Surgeon: Eric Antunez Jr., MD;  Location: [facility] CATH LAB;  Service: Cardiology;  Laterality: N/A;    RIGHT HEART CATHETERIZATION Right 12/31/2019    Procedure: INSERTION, CATHETER, RIGHT HEART;  Surgeon: Eric Antunez Jr., MD;  Location: [facility] CATH LAB;  Service: Cardiology;  Laterality: Right;    SINUS SURGERY Right 1994    with lymph nodes    STERNAL WOUND CLOSURE  9/10/2019    Procedure: CLOSURE, WOUND, STERNUM;  Surgeon: Shar Walden MD;  Location: [facility] OR [address] [place] [address];  Service: Cardiovascular;;    TEMPOROMANDIBULAR JOINT SURGERY Right 1988    TONSILLECTOMY      TREATMENT OF CARDIAC ARRHYTHMIA N/A 9/24/2019    Procedure: CARDIOVERSION;  Surgeon: Moe Barrear MD;  Location: [facility] EP LAB;  Service: Cardiology;  Laterality: N/A;  AF, DCCV/NADINE, anes, DM, Rm 3073    TYMPANOSTOMY TUBE PLACEMENT  1971- 1979    multiple tube placements       Review of patient's allergies indicates:   Allergen Reactions    Adhesive Blisters     Reaction to area in chest and up only    Codeine Itching       Current Facility-Administered Medications   Medication    acetaminophen tablet 650 mg    aspirin EC tablet 81 mg    atorvastatin tablet 20 mg    calcium-vitamin D3 500 mg(1,250mg) -200 unit per tablet 2 tablet    DOBUTamine 500mg in D5W 250mL infusion (premix) (NON-TITRATING)    famotidine tablet 20 mg    ferrous sulfate EC tablet 325 mg    furosemide (LASIX) 2 mg/mL in sodium chloride 0.9% 100 mL infusion (conc: 2 mg/mL)    furosemide injection 80 mg    gabapentin capsule 300 mg    heparin (porcine) injection 5,000 Units    magnesium oxide  tablet 400 mg    mirtazapine tablet 30 mg    mycophenolate capsule 1,000 mg    nystatin 100,000 unit/mL suspension 500,000 Units    oxyCODONE immediate release tablet Tab 10 mg    [START ON 1/4/2020] predniSONE tablet 15 mg    promethazine tablet 25 mg    senna-docusate 8.6-50 mg per tablet 2 tablet    SITagliptin tablet 50 mg    sodium chloride 0.9% flush 10 mL    [START ON 1/6/2020] sulfamethoxazole-trimethoprim 400-80mg per tablet 1 tablet    tacrolimus capsule 2 mg    theophylline 24 hr capsule 400 mg    valGANciclovir tablet 450 mg    venlafaxine 24 hr capsule 150 mg    zolpidem tablet 5 mg     Family History     Problem Relation (Age of Onset)    Broken bones Maternal Grandmother    Cancer Paternal Grandmother    Dislocations Maternal Grandmother    Heart failure Paternal Grandfather, Maternal Grandfather    Migraines Mother, Maternal Grandmother, Paternal Grandmother, Paternal Grandfather, Maternal Grandfather    Obesity Paternal Grandmother    Osteoarthritis Mother, Maternal Grandmother, Paternal Grandmother, Paternal Grandfather, Maternal Grandfather, Father    Osteoporosis Maternal Grandmother    Scoliosis Maternal Grandmother    Stroke Father        Tobacco Use    Smoking status: Never Smoker    Smokeless tobacco: Never Used   Substance and Sexual Activity    Alcohol use: No    Drug use: No    Sexual activity: Not Currently     Review of Systems   All other systems reviewed and are negative.    Objective:     Vital Signs (Most Recent):  Temp: 98.2 °F (36.8 °C) (01/03/20 1208)  Pulse: 105 (01/03/20 1208)  Resp: 19 (01/03/20 1208)  BP: 117/75 (01/03/20 1208)  SpO2: (!) 91 % (01/03/20 1208) Vital Signs (24h Range):  Temp:  [98.2 °F (36.8 °C)-98.8 °F (37.1 °C)] 98.2 °F (36.8 °C)  Pulse:  [] 105  Resp:  [15-23] 19  SpO2:  [90 %-96 %] 91 %  BP: (105-131)/(67-84) 117/75     Patient Vitals for the past 72 hrs (Last 3 readings):   Weight   01/03/20 0600 109 kg (240 lb 4.8 oz)   01/02/20  1201 106.1 kg (234 lb)   01/02/20 1130 106.4 kg (234 lb 9.1 oz)     Body mass index is 36.54 kg/m².      Intake/Output Summary (Last 24 hours) at 1/3/2020 1229  Last data filed at 1/3/2020 1100  Gross per 24 hour   Intake 751.11 ml   Output 400 ml   Net 351.11 ml       Physical Exam   Constitutional: She appears well-developed and well-nourished. No distress.   HENT:   Head: Normocephalic.   Eyes: Pupils are equal, round, and reactive to light. EOM are normal.   Neck:   Thick neck, unable to appreciate JVP, prominent EJ   Cardiovascular: Normal rate, regular rhythm and normal heart sounds.   No murmur heard.  Chest with midline surgical wound closed, no erythema or discharge.    Pulmonary/Chest: Effort normal. No respiratory distress.   Decreased BC bibasilar   Abdominal: Soft. She exhibits no distension. There is no tenderness.   Musculoskeletal:   Trace LE edema. R groin with clean closed wound, no discharge or erythema, small collection palpated, mildly tender   Skin: Skin is warm.       Significant Labs:  CBC:  Recent Labs   Lab 12/30/19  0633 12/31/19  0730 01/03/20  0540   WBC 12.69 12.13 8.93   RBC 3.08* 2.95* 2.48*   HGB 8.3* 8.0* 6.7*   HCT 28.5* 27.1* 22.1*   * 423* 303   MCV 93 92 89   MCH 26.9* 27.1 27.0   MCHC 29.1* 29.5* 30.3*     BNP:  Recent Labs   Lab 12/30/19  1017 12/31/19  0730 01/02/20  0835   * 510* 594*     CMP:  Recent Labs   Lab 12/30/19  0633 12/31/19  0730 01/02/20  0835 01/03/20  0540   * 147* 177* 104   CALCIUM 9.0 9.1 9.2 8.3*   ALBUMIN 2.8* 2.9*  --  2.4*   PROT 6.1 6.0  --  5.4*    140 140 136   K 3.9 4.0 4.2 4.0   CO2 29 28 32* 30*   CL 97 99 96 95   BUN 30* 30* 35* 36*   CREATININE 1.4 1.7* 2.4* 2.6*   ALKPHOS 94 84  --  80   ALT 25 20  --  12   AST 18 10  --  10   BILITOT 0.6 0.8  --  0.5      Coagulation:   No results for input(s): PT, INR, APTT in the last 168 hours.  LDH:  No results for input(s): LDH in the last 72 hours.  Microbiology:  Microbiology  Results (last 7 days)     ** No results found for the last 168 hours. **          I have reviewed all pertinent labs within the past 24 hours.        Assessment and Plan:     48yo F s/p OHTx on 12/16/2019 for NICM/ HFrEF with HM3 on 9/2019, removed at time of transplant, as well as AFl post LVAD s/p NADINE/DCCV, VT/VF (some a/w hypokalemia), HLD, obesity, and OA, who presented for worsening LEGER and GLO. RHC on 12/31 with elevated filling pressures (RA 17, PCWP 15). Pathology of native heart revealed non-caseating granulomas typical of sarcoidosis. Presentation is likely c/w cardiorenal syndrome in the setting of ADHF/ fluid overload.        Acute on chronic diastolic heart failure- NYHA III, hypervolemic   S/p OHTx on 12/16 for NICM, path revealed sarcoidosis. Now presenting with worsening LEGER, nausea and GLO. RHC on 12/31 with elevated filling pressures (RA 17, PCWP 15).  - RIJ line placed, initial CVP 14  - TTE revealed LVEF 65% and normal RV function, mild MR, mod-sev TR, IVC not seen; no pericardial effusion appreciated  - Poor UOP with furosemide 80mg IV BID; will re-bolus and start gtt at 10mg/h  - Increase dobutamine to 5mcg/kg/min  - Monitor CVP  - Monitor UOP, daily weight  - Monitor electrolytes and replete for target K>4 and Mg>2     Status post heart transplant  - Tacrolimus level slightly elevated at 11--> will decrease to 2mg PO BID  - Continue mycophenolate 1000mg BID  - Decrease prednisone to 15mg daily  - Continue TMP/SMX and valganciclovir  - Follow up EMB biopsy result from 12/31  - Path of native heart revealed sarcoidosis, will need close f/u and possible chest/ extracardiac imaging     * GLO (acute kidney injury)  Likely cardiorenal syndrome with renal venous congestion  - IV diuresis and inotropes as above  - Monitor renal function and electrolytes  - Renally dose meds  - Avoid nephrotoxins  - UA WNL    * Normocytic anemia  - Iron panel ordered  - Monitor HB     * R groin fluid collection- seroma  vs hematoma      - Repeat R groin US with shows decreased size of fluid collection      - Continue monitoring     * Chronic pain      - Continue gabapentin      - Continue Tylenol and oxycodone PRN     * Miscellaneous      - Diet: Cardiac      - DVT ppx: Heparin SQ      - Code status: Full code       Triny Yusuf MD  Heart Transplant  [facility]

## 2020-01-03 NOTE — PLAN OF CARE
Pt AAOx4, VSS on VISI monitor. TELE monitoring in progress, ST low 100s.  On RA w/ O2 sats >93%.  Pt noted to be sating lower when sleeping due to snoring.    Central line dressing CDI.  CVP obtained this am = 14.  Dobutamine gtt still running @ 2.5 mcg/kg/min.  .5 kg.    No nausea overnight. No pain.  Multiple episodes of diarrhea continued due to taking milk of magnesia prior to arriving to floor.  Pt stating episodes and amount decreasing however.  R groin less swollen and red per patients statements.  Pt independent and ambulatory to bathroom without issues.  Fall precautions in place. Nonskid socks on feet, call bell in reach, bed lowered and locked.  No acute events/falls/injuries. See flowsheet for further assessment findings. Will monitor.

## 2020-01-03 NOTE — SUBJECTIVE & OBJECTIVE
Interval events:  Patient was seen and examined this morning at bedside. Yesterday she was started on IV furosemide and low dose  but she didn't have good UOP. No acute overnight events. This morning she reports feeling okay, currently not SOB but hasn't really gotten out of bed.     Past Medical History:   Diagnosis Date    Fractures     History of ventricular fibrillation 9/4/2019    History of ventricular tachycardia 9/4/2019    Hyperlipidemia     Migraine     Osteoarthritis        Past Surgical History:   Procedure Laterality Date    BACK SURGERY      2007    BIOPSY WITH ULTRASOUND GUIDANCE N/A 12/31/2019    Procedure: BIOPSY, WITH US GUIDANCE;  Surgeon: Eric Antunez Jr., MD;  Location: [facility] CATH LAB;  Service: Cardiology;  Laterality: N/A;    BONE GRAFT Left     from Left hip to Left FA    eardrum reconstruction  1980    ELBOW SURGERY Left [id number]    FOREARM FRACTURE SURGERY Bilateral [id number]    multiple surgeries    HEART TRANSPLANT N/A 12/16/2019    Procedure: TRANSPLANT, HEART;  Surgeon: Talha Nuñez MD;  Location: [facility] OR [address] [place] [address];  Service: Cardiothoracic;  Laterality: N/A;    INSERTION OF GRAFT TO PERICARDIUM  9/10/2019    Procedure: INSERTION, GRAFT, PERICARDIUM;  Surgeon: Shar Walden MD;  Location: [facility] OR [address] [place] [address];  Service: Cardiovascular;;    INSERTION OF IMPLANTABLE CARDIOVERTER-DEFIBRILLATOR (ICD) GENERATOR WITH TWO EXISTING LEADS      INSERTION OF PACEMAKER Left     LEFT VENTRICULAR ASSIST DEVICE N/A 9/10/2019    Procedure: INSERTION-LEFT VENTRICULAR ASSIST DEVICE;  Surgeon: Shar Walden MD;  Location: [facility] OR [address] [place] [address];  Service: Cardiovascular;  Laterality: N/A;  DT HM3     LUMBAR FUSION  2007    L4-L5    RIGHT HEART CATHETERIZATION Right 9/4/2019    Procedure: INSERTION, CATHETER, RIGHT HEART;  Surgeon: Vi Bryant MD;  Location: [facility] CATH LAB;  Service: Cardiology;  Laterality: Right;    RIGHT HEART CATHETERIZATION N/A 11/18/2019    Procedure:  INSERTION, CATHETER, RIGHT HEART;  Surgeon: Eric Antunez Jr., MD;  Location: [facility] CATH LAB;  Service: Cardiology;  Laterality: N/A;    RIGHT HEART CATHETERIZATION Right 12/31/2019    Procedure: INSERTION, CATHETER, RIGHT HEART;  Surgeon: Eric Antunez Jr., MD;  Location: [facility] CATH LAB;  Service: Cardiology;  Laterality: Right;    SINUS SURGERY Right 1994    with lymph nodes    STERNAL WOUND CLOSURE  9/10/2019    Procedure: CLOSURE, WOUND, STERNUM;  Surgeon: Shar Walden MD;  Location: [facility] OR [facility] FLR;  Service: Cardiovascular;;    TEMPOROMANDIBULAR JOINT SURGERY Right 1988    TONSILLECTOMY      TREATMENT OF CARDIAC ARRHYTHMIA N/A 9/24/2019    Procedure: CARDIOVERSION;  Surgeon: Moe Barrera MD;  Location: [facility] EP LAB;  Service: Cardiology;  Laterality: N/A;  AF, DCCV/NADINE, anes, DM, Rm 3073    TYMPANOSTOMY TUBE PLACEMENT  1971- 1979    multiple tube placements       Review of patient's allergies indicates:   Allergen Reactions    Adhesive Blisters     Reaction to area in chest and up only    Codeine Itching       Current Facility-Administered Medications   Medication    acetaminophen tablet 650 mg    aspirin EC tablet 81 mg    atorvastatin tablet 20 mg    calcium-vitamin D3 500 mg(1,250mg) -200 unit per tablet 2 tablet    DOBUTamine 500mg in D5W 250mL infusion (premix) (NON-TITRATING)    famotidine tablet 20 mg    ferrous sulfate EC tablet 325 mg    furosemide (LASIX) 2 mg/mL in sodium chloride 0.9% 100 mL infusion (conc: 2 mg/mL)    furosemide injection 80 mg    gabapentin capsule 300 mg    heparin (porcine) injection 5,000 Units    magnesium oxide tablet 400 mg    mirtazapine tablet 30 mg    mycophenolate capsule 1,000 mg    nystatin 100,000 unit/mL suspension 500,000 Units    oxyCODONE immediate release tablet Tab 10 mg    [START ON 1/4/2020] predniSONE tablet 15 mg    promethazine tablet 25 mg    senna-docusate 8.6-50 mg per tablet 2 tablet    SITagliptin tablet 50  mg    sodium chloride 0.9% flush 10 mL    [START ON 1/6/2020] sulfamethoxazole-trimethoprim 400-80mg per tablet 1 tablet    tacrolimus capsule 2 mg    theophylline 24 hr capsule 400 mg    valGANciclovir tablet 450 mg    venlafaxine 24 hr capsule 150 mg    zolpidem tablet 5 mg     Family History     Problem Relation (Age of Onset)    Broken bones Maternal Grandmother    Cancer Paternal Grandmother    Dislocations Maternal Grandmother    Heart failure Paternal Grandfather, Maternal Grandfather    Migraines Mother, Maternal Grandmother, Paternal Grandmother, Paternal Grandfather, Maternal Grandfather    Obesity Paternal Grandmother    Osteoarthritis Mother, Maternal Grandmother, Paternal Grandmother, Paternal Grandfather, Maternal Grandfather, Father    Osteoporosis Maternal Grandmother    Scoliosis Maternal Grandmother    Stroke Father        Tobacco Use    Smoking status: Never Smoker    Smokeless tobacco: Never Used   Substance and Sexual Activity    Alcohol use: No    Drug use: No    Sexual activity: Not Currently     Review of Systems   All other systems reviewed and are negative.    Objective:     Vital Signs (Most Recent):  Temp: 98.2 °F (36.8 °C) (01/03/20 1208)  Pulse: 105 (01/03/20 1208)  Resp: 19 (01/03/20 1208)  BP: 117/75 (01/03/20 1208)  SpO2: (!) 91 % (01/03/20 1208) Vital Signs (24h Range):  Temp:  [98.2 °F (36.8 °C)-98.8 °F (37.1 °C)] 98.2 °F (36.8 °C)  Pulse:  [] 105  Resp:  [15-23] 19  SpO2:  [90 %-96 %] 91 %  BP: (105-131)/(67-84) 117/75     Patient Vitals for the past 72 hrs (Last 3 readings):   Weight   01/03/20 0600 109 kg (240 lb 4.8 oz)   01/02/20 1201 106.1 kg (234 lb)   01/02/20 1130 106.4 kg (234 lb 9.1 oz)     Body mass index is 36.54 kg/m².      Intake/Output Summary (Last 24 hours) at 1/3/2020 1229  Last data filed at 1/3/2020 1100  Gross per 24 hour   Intake 751.11 ml   Output 400 ml   Net 351.11 ml       Physical Exam   Constitutional: She appears well-developed and  well-nourished. No distress.   HENT:   Head: Normocephalic.   Eyes: Pupils are equal, round, and reactive to light. EOM are normal.   Neck:   Thick neck, unable to appreciate JVP, prominent EJ   Cardiovascular: Normal rate, regular rhythm and normal heart sounds.   No murmur heard.  Chest with midline surgical wound closed, no erythema or discharge.    Pulmonary/Chest: Effort normal. No respiratory distress.   Decreased BC bibasilar   Abdominal: Soft. She exhibits no distension. There is no tenderness.   Musculoskeletal:   Trace LE edema. R groin with clean closed wound, no discharge or erythema, small collection palpated, mildly tender   Skin: Skin is warm.       Significant Labs:  CBC:  Recent Labs   Lab 12/30/19  0633 12/31/19  0730 01/03/20  0540   WBC 12.69 12.13 8.93   RBC 3.08* 2.95* 2.48*   HGB 8.3* 8.0* 6.7*   HCT 28.5* 27.1* 22.1*   * 423* 303   MCV 93 92 89   MCH 26.9* 27.1 27.0   MCHC 29.1* 29.5* 30.3*     BNP:  Recent Labs   Lab 12/30/19  1017 12/31/19  0730 01/02/20  0835   * 510* 594*     CMP:  Recent Labs   Lab 12/30/19  0633 12/31/19  0730 01/02/20  0835 01/03/20  0540   * 147* 177* 104   CALCIUM 9.0 9.1 9.2 8.3*   ALBUMIN 2.8* 2.9*  --  2.4*   PROT 6.1 6.0  --  5.4*    140 140 136   K 3.9 4.0 4.2 4.0   CO2 29 28 32* 30*   CL 97 99 96 95   BUN 30* 30* 35* 36*   CREATININE 1.4 1.7* 2.4* 2.6*   ALKPHOS 94 84  --  80   ALT 25 20  --  12   AST 18 10  --  10   BILITOT 0.6 0.8  --  0.5      Coagulation:   No results for input(s): PT, INR, APTT in the last 168 hours.  LDH:  No results for input(s): LDH in the last 72 hours.  Microbiology:  Microbiology Results (last 7 days)     ** No results found for the last 168 hours. **          I have reviewed all pertinent labs within the past 24 hours.

## 2020-01-03 NOTE — NURSING
Rounds Report: Attended interdisciplinary rounds this afternoon   with the transplant team including SW, physicians, fellows,  mid-level providers, and transplant coordinators.Discussed plan of care.

## 2020-01-03 NOTE — PROGRESS NOTES
On native heart widespread sarcoidosis.  This was not known prior to heart transplant.  In retrospect CT had some mediastinal nodes that may be due to sarcoidosis.  She will need PET with FDG full body and consult with Rheumatology.  Discussed with Dr. Prieto earlier today as noted being admitted.  Will check EKG and awaiting biopsy result.  Orders entered for PET and Rheum consult.  I did not call patient instead I deferred to in-patient team but posted to her [facility] tonight.

## 2020-01-03 NOTE — PROGRESS NOTES
Admit Note     Met with patient and friend to assess needs. Patient is a 49 y.o. single female, admitted for GLO, chronic pain,pt received a OHT on 12/16/19.      Patient admitted from clinic on 1/2/2020 .  At this time, patient presents as alert and oriented x 4, good eye contact, calm and communicative.  At this time, patients caregiver presents as alert and oriented x 4, good eye contact, calm and communicative.    Household/Family Systems     Patient resides with patient's friend Flavia at the [facility].       When discharged from [facility] the pt plans to go to back to the home of her friend Flavia at:     [address]  [place] [id number]         Support system includes mother, sister and good friends.     Patient does not have dependents that are need of being cared for.   The pt doesn't have any children.   The pt's friend Flavia and mother are the pt's primary and secondary caregivers for transplant.      Patients primary caregiver is Flavia Nuñez, best friend.     Pt's cell:  [phone]    Emergency contacts:     Cristal Navas (mother, lives in [place], [place], five hours from [place], works part time and drives) [phone]  Pt's mother is currently at [facility].   Flavia Nuñez (best friend, lives in [place], works full time and drives) [phone]  Sharondamani Kitchen (sister, lives in [place]) [phone]    The pt's mother, sister and best friend Flavia are all the pt's HCPA.     During admission, patient's caregiver plans to stay in patient's room.  Confirmed patient and patients caregivers do have access to reliable transportation.    Cognitive Status/Learning     Patient reports reading ability as college and states patient does not have difficulty with reading, writing, seeing, hearing and memory. Pt does wear glasses.   Pt reports needed assistance with learning and comprehending new information.    Patient reports patient learns best by visual.      Needed:  No.   Highest education level: High School (9-12) or GED    Vocation/Disability   .  Working for Income: No  If no, reason not working: Demands of Treatment  Patient is receiving short term disability from work.  Pt reports she will receive STD until she returns to work.  The pt is an  at a  and hospice company.   Pt reports no financial changes at this time.     Adherence     Patient reports a high level of adherence to patients health care regimen.  Adherence counseling and education provided. Patient verbalizes understanding.    Substance Use    Patient reports the following substance usage.    Tobacco: none, patient denies any use.  Alcohol: none, patient denies any use.  Illicit Drugs/Non-prescribed Medications: none, patient denies any use.  Patient states clear understanding of the potential impact of substance use.  Substance abstinence/cessation counseling, education and resources provided and reviewed.     Services Utilizing/ADLS    Infusion Service: Prior to admission, patient utilizing? no  Home Health: Prior to admission, patient utilizing? yes   [facility]  [phone], fax [phone]. Pt reports she would like to use the same [employer] when d/c.   DME: Prior to admission, no, Pt reports a raised toilet seat has been ordered.   Pulmonary/Cardiac Rehab: Prior to admission, no  Dialysis:  Prior to admission, no  Transplant Specialty Pharmacy:  Prior to admission, no.    Prior to admission, patient reports patient was somewhat  independent with ADLS. Pt reports she moves around the apartment by holding on to the walls. Pt reports she has been very week and spending most of her time in bed sleeping. Pt was not driving, family and friends drive.  Patient reports patient is not able to care for self at this time due to compromised medical condition (as documented in medical record) and physical weakness..  Patient indicates a willingness to care for self once medically cleared to do  so.    Insurance/Medications    Insured by   Payor/Plan Subscr  Sex Relation Sub. Ins. ID Effective Group Num   1. YON VILLELA* 1970 Female  [id number]* [id number]                                   [address] [address]   2. YON VILLELA* 1970 Female Self [id number]* 12/15/19                                    [address] [address]      Primary Insurance (for UNOS reporting): Private Insurance  Secondary Insurance (for UNOS reporting): None    Patient reports patient is able to obtain and afford medications at this time and at time of discharge.    Living Will/Healthcare Power of     Patient states patient has a LW and/or HCPA.   provided education regarding LW and HCPA and the completion of forms.    Coping/Mental Health    Patient is coping adequately with the aid of  family members and friends.   Patient indicates mental health difficulties.   Pt reports some difficulty with anxiety/depression given medical needs. Pt reports the steroids she's on is causing some mental health difficulties. Pt reports she is taking Effexor which helps to manage her mental health concerns.  Pt reports she is adjusting to the [facility] .  Pt reports she hopes to feel better soon. Pt reports continued support from family and friends.   Worker provided support and encouragement.      Discharge Planning    At time of discharge, patient plans to return to [facility] apartments under the care of self and friend.  Patients friend will transport patient.  Per rounds today, expected discharge date has not been medically determined at this time. Patient and patients caregiver  verbalize understanding and are involved in treatment planning and discharge process.    Additional Concerns    Patient is being followed for needs, education, resources, information, emotional support, supportive counseling, and for supportive and skilled discharge plan of care.   providing ongoing psychosocial support, education, resources and d/c planning as needed.  SW remains available. Patient's caregiver verbalizes understanding and agreement with information reviewed,  availability and how to access available resources as needed. Patient verbalizes understanding and agreement with information reviewed, social work availability, and how to access available resources as needed.

## 2020-01-04 LAB
ALBUMIN SERPL BCP-MCNC: 2.5 G/DL (ref 3.5–5.2)
ALLENS TEST: ABNORMAL
ALP SERPL-CCNC: 79 U/L (ref 55–135)
ALT SERPL W/O P-5'-P-CCNC: 12 U/L (ref 10–44)
ANION GAP SERPL CALC-SCNC: 11 MMOL/L (ref 8–16)
AST SERPL-CCNC: 9 U/L (ref 10–40)
BASOPHILS # BLD AUTO: 0.01 K/UL (ref 0–0.2)
BASOPHILS NFR BLD: 0.1 % (ref 0–1.9)
BILIRUB SERPL-MCNC: 0.5 MG/DL (ref 0.1–1)
BLD PROD TYP BPU: NORMAL
BLOOD UNIT EXPIRATION DATE: NORMAL
BLOOD UNIT TYPE CODE: 5100
BLOOD UNIT TYPE: NORMAL
BUN SERPL-MCNC: 41 MG/DL (ref 6–20)
CALCIUM SERPL-MCNC: 8.5 MG/DL (ref 8.7–10.5)
CHLORIDE SERPL-SCNC: 93 MMOL/L (ref 95–110)
CO2 SERPL-SCNC: 31 MMOL/L (ref 23–29)
CODING SYSTEM: NORMAL
CREAT SERPL-MCNC: 3.3 MG/DL (ref 0.5–1.4)
DELSYS: ABNORMAL
DIFFERENTIAL METHOD: ABNORMAL
DISPENSE STATUS: NORMAL
EOSINOPHIL # BLD AUTO: 0.1 K/UL (ref 0–0.5)
EOSINOPHIL NFR BLD: 1.5 % (ref 0–8)
ERYTHROCYTE [DISTWIDTH] IN BLOOD BY AUTOMATED COUNT: 16.9 % (ref 11.5–14.5)
EST. GFR  (AFRICAN AMERICAN): 17.9 ML/MIN/1.73 M^2
EST. GFR  (NON AFRICAN AMERICAN): 15.5 ML/MIN/1.73 M^2
GLUCOSE SERPL-MCNC: 98 MG/DL (ref 70–110)
HCO3 UR-SCNC: 30.8 MMOL/L (ref 24–28)
HCT VFR BLD AUTO: 23.4 % (ref 37–48.5)
HGB BLD-MCNC: 7.2 G/DL (ref 12–16)
IMM GRANULOCYTES # BLD AUTO: 0.06 K/UL (ref 0–0.04)
IMM GRANULOCYTES NFR BLD AUTO: 0.9 % (ref 0–0.5)
LYMPHOCYTES # BLD AUTO: 0.2 K/UL (ref 1–4.8)
LYMPHOCYTES NFR BLD: 2.2 % (ref 18–48)
MAGNESIUM SERPL-MCNC: 3 MG/DL (ref 1.6–2.6)
MCH RBC QN AUTO: 26.7 PG (ref 27–31)
MCHC RBC AUTO-ENTMCNC: 30.8 G/DL (ref 32–36)
MCV RBC AUTO: 87 FL (ref 82–98)
MODE: ABNORMAL
MONOCYTES # BLD AUTO: 0.3 K/UL (ref 0.3–1)
MONOCYTES NFR BLD: 4.3 % (ref 4–15)
NEUTROPHILS # BLD AUTO: 6.2 K/UL (ref 1.8–7.7)
NEUTROPHILS NFR BLD: 91 % (ref 38–73)
NRBC BLD-RTO: 0 /100 WBC
NUM UNITS TRANS PACKED RBC: NORMAL
PCO2 BLDA: 37.1 MMHG (ref 35–45)
PH SMN: 7.53 [PH] (ref 7.35–7.45)
PLATELET # BLD AUTO: 303 K/UL (ref 150–350)
PMV BLD AUTO: 9.1 FL (ref 9.2–12.9)
PO2 BLDA: 35 MMHG (ref 40–60)
POC BE: 8 MMOL/L
POC SATURATED O2: 75 % (ref 95–100)
POC TCO2: 32 MMOL/L (ref 24–29)
POTASSIUM SERPL-SCNC: 3.8 MMOL/L (ref 3.5–5.1)
PROT SERPL-MCNC: 5.5 G/DL (ref 6–8.4)
RBC # BLD AUTO: 2.7 M/UL (ref 4–5.4)
SAMPLE: ABNORMAL
SITE: ABNORMAL
SODIUM SERPL-SCNC: 135 MMOL/L (ref 136–145)
SP02: 95
TACROLIMUS BLD-MCNC: 10.5 NG/ML (ref 5–15)
WBC # BLD AUTO: 6.76 K/UL (ref 3.9–12.7)

## 2020-01-04 PROCEDURE — 83735 ASSAY OF MAGNESIUM: CPT

## 2020-01-04 PROCEDURE — 80197 ASSAY OF TACROLIMUS: CPT

## 2020-01-04 PROCEDURE — 93010 EKG 12-LEAD: ICD-10-PCS | Mod: ,,, | Performed by: INTERNAL MEDICINE

## 2020-01-04 PROCEDURE — 99900035 HC TECH TIME PER 15 MIN (STAT)

## 2020-01-04 PROCEDURE — 93005 ELECTROCARDIOGRAM TRACING: CPT

## 2020-01-04 PROCEDURE — 36430 TRANSFUSION BLD/BLD COMPNT: CPT

## 2020-01-04 PROCEDURE — 99233 PR SUBSEQUENT HOSPITAL CARE,LEVL III: ICD-10-PCS | Mod: ,,, | Performed by: INTERNAL MEDICINE

## 2020-01-04 PROCEDURE — 63600175 PHARM REV CODE 636 W HCPCS: Performed by: STUDENT IN AN ORGANIZED HEALTH CARE EDUCATION/TRAINING PROGRAM

## 2020-01-04 PROCEDURE — 25000003 PHARM REV CODE 250: Performed by: STUDENT IN AN ORGANIZED HEALTH CARE EDUCATION/TRAINING PROGRAM

## 2020-01-04 PROCEDURE — 99233 SBSQ HOSP IP/OBS HIGH 50: CPT | Mod: ,,, | Performed by: INTERNAL MEDICINE

## 2020-01-04 PROCEDURE — 85025 COMPLETE CBC W/AUTO DIFF WBC: CPT

## 2020-01-04 PROCEDURE — 51798 US URINE CAPACITY MEASURE: CPT

## 2020-01-04 PROCEDURE — P9016 RBC LEUKOCYTES REDUCED: HCPCS

## 2020-01-04 PROCEDURE — 82803 BLOOD GASES ANY COMBINATION: CPT

## 2020-01-04 PROCEDURE — 63600175 PHARM REV CODE 636 W HCPCS: Performed by: INTERNAL MEDICINE

## 2020-01-04 PROCEDURE — 80053 COMPREHEN METABOLIC PANEL: CPT

## 2020-01-04 PROCEDURE — 20600001 HC STEP DOWN PRIVATE ROOM

## 2020-01-04 PROCEDURE — 93010 ELECTROCARDIOGRAM REPORT: CPT | Mod: ,,, | Performed by: INTERNAL MEDICINE

## 2020-01-04 PROCEDURE — 25000003 PHARM REV CODE 250: Performed by: INTERNAL MEDICINE

## 2020-01-04 PROCEDURE — 94761 N-INVAS EAR/PLS OXIMETRY MLT: CPT

## 2020-01-04 RX ORDER — TACROLIMUS 1 MG/1
1 CAPSULE ORAL 2 TIMES DAILY
Status: DISCONTINUED | OUTPATIENT
Start: 2020-01-04 | End: 2020-01-05

## 2020-01-04 RX ORDER — HYDROCODONE BITARTRATE AND ACETAMINOPHEN 500; 5 MG/1; MG/1
TABLET ORAL
Status: DISCONTINUED | OUTPATIENT
Start: 2020-01-04 | End: 2020-01-04

## 2020-01-04 RX ORDER — HYDROCODONE BITARTRATE AND ACETAMINOPHEN 500; 5 MG/1; MG/1
TABLET ORAL
Status: DISCONTINUED | OUTPATIENT
Start: 2020-01-04 | End: 2020-01-06

## 2020-01-04 RX ADMIN — TACROLIMUS 1 MG: 1 CAPSULE ORAL at 01:01

## 2020-01-04 RX ADMIN — PROMETHAZINE HYDROCHLORIDE 25 MG: 25 TABLET ORAL at 03:01

## 2020-01-04 RX ADMIN — DOBUTAMINE IN DEXTROSE 2.5 MCG/KG/MIN: 200 INJECTION, SOLUTION INTRAVENOUS at 09:01

## 2020-01-04 RX ADMIN — GABAPENTIN 300 MG: 300 CAPSULE ORAL at 09:01

## 2020-01-04 RX ADMIN — NYSTATIN 500000 UNITS: 100000 SUSPENSION ORAL at 05:01

## 2020-01-04 RX ADMIN — HEPARIN SODIUM 5000 UNITS: 5000 INJECTION, SOLUTION INTRAVENOUS; SUBCUTANEOUS at 08:01

## 2020-01-04 RX ADMIN — PREDNISONE 15 MG: 10 TABLET ORAL at 08:01

## 2020-01-04 RX ADMIN — FERROUS SULFATE TAB EC 325 MG (65 MG FE EQUIVALENT) 325 MG: 325 (65 FE) TABLET DELAYED RESPONSE at 08:01

## 2020-01-04 RX ADMIN — MYCOPHENOLATE MOFETIL 1000 MG: 250 CAPSULE ORAL at 08:01

## 2020-01-04 RX ADMIN — ZOLPIDEM TARTRATE 5 MG: 5 TABLET ORAL at 09:01

## 2020-01-04 RX ADMIN — MYCOPHENOLATE MOFETIL 1000 MG: 250 CAPSULE ORAL at 09:01

## 2020-01-04 RX ADMIN — NYSTATIN 500000 UNITS: 100000 SUSPENSION ORAL at 09:01

## 2020-01-04 RX ADMIN — FAMOTIDINE 20 MG: 20 TABLET ORAL at 08:01

## 2020-01-04 RX ADMIN — HEPARIN SODIUM 5000 UNITS: 5000 INJECTION, SOLUTION INTRAVENOUS; SUBCUTANEOUS at 09:01

## 2020-01-04 RX ADMIN — OXYCODONE HYDROCHLORIDE 10 MG: 10 TABLET ORAL at 03:01

## 2020-01-04 RX ADMIN — Medication 400 MG: at 08:01

## 2020-01-04 RX ADMIN — PROMETHAZINE HYDROCHLORIDE 25 MG: 25 TABLET ORAL at 12:01

## 2020-01-04 RX ADMIN — VENLAFAXINE HYDROCHLORIDE 150 MG: 37.5 CAPSULE, EXTENDED RELEASE ORAL at 08:01

## 2020-01-04 RX ADMIN — PROMETHAZINE HYDROCHLORIDE 25 MG: 25 TABLET ORAL at 09:01

## 2020-01-04 RX ADMIN — SITAGLIPTIN 50 MG: 50 TABLET, FILM COATED ORAL at 08:01

## 2020-01-04 RX ADMIN — VALGANCICLOVIR 450 MG: 450 TABLET, FILM COATED ORAL at 08:01

## 2020-01-04 RX ADMIN — TACROLIMUS 1 MG: 1 CAPSULE ORAL at 05:01

## 2020-01-04 RX ADMIN — NYSTATIN 500000 UNITS: 100000 SUSPENSION ORAL at 08:01

## 2020-01-04 RX ADMIN — ASPIRIN 81 MG: 81 TABLET, COATED ORAL at 08:01

## 2020-01-04 RX ADMIN — ATORVASTATIN CALCIUM 20 MG: 20 TABLET, FILM COATED ORAL at 08:01

## 2020-01-04 RX ADMIN — OYSTER SHELL CALCIUM WITH VITAMIN D 2 TABLET: 500; 200 TABLET, FILM COATED ORAL at 08:01

## 2020-01-04 RX ADMIN — NYSTATIN 500000 UNITS: 100000 SUSPENSION ORAL at 01:01

## 2020-01-04 RX ADMIN — THEOPHYLLINE ANHYDROUS 400 MG: 100 CAPSULE, EXTENDED RELEASE ORAL at 08:01

## 2020-01-04 RX ADMIN — SENNOSIDES AND DOCUSATE SODIUM 2 TABLET: 8.6; 5 TABLET ORAL at 09:01

## 2020-01-04 RX ADMIN — FERROUS SULFATE TAB EC 325 MG (65 MG FE EQUIVALENT) 325 MG: 325 (65 FE) TABLET DELAYED RESPONSE at 09:01

## 2020-01-04 RX ADMIN — MIRTAZAPINE 30 MG: 15 TABLET, FILM COATED ORAL at 09:01

## 2020-01-04 NOTE — PLAN OF CARE
Pt AAO x4. Pt c/o dizziness and increased shakiness with getting out of bed. Lasix gtt started 10 mg/hr. Dobutamine infusing at 5 mcg/kg/min. Pt free from falls and injury throughout shift. Pt friend at bedside throughout shift participating in care. Cr remains elevated.

## 2020-01-04 NOTE — CARE UPDATE
HTS Update:    CT without acute findings and shows no increase in size of  fluid collection/hematoma.     Transfusing 1u PRBC   Decreasing  to 2.5  Repeat labs and hemodynamics post transfusion      Discussed with Dr. Prieto, attending on call.    Christa Chen MD  Cardiology Fellow  PGY 4

## 2020-01-04 NOTE — ASSESSMENT & PLAN NOTE
- tacrolimus held overnight but resumed this morning at 1mg BID; continue follow levels   -Continue mycophenolate 1000mg BID  -Continue prednisone 20mg daily  -Continue TMP/SMX and valganciclovir  -Follow up EMB biopsy result from 12/31

## 2020-01-04 NOTE — CONSULTS
Radiology Consult    Deborah Navas is a 49 y.o. female with a history of heart transplant and bilateral pleural effusions.    Past Medical History:   Diagnosis Date    Fractures     History of ventricular fibrillation 9/4/2019    History of ventricular tachycardia 9/4/2019    Hyperlipidemia     Migraine     Multinodular goiter 9/5/2019    Osteoarthritis      Past Surgical History:   Procedure Laterality Date    BACK SURGERY      2007    BIOPSY WITH ULTRASOUND GUIDANCE N/A 12/31/2019    Procedure: BIOPSY, WITH US GUIDANCE;  Surgeon: Eric Antunez Jr., MD;  Location: [facility] CATH LAB;  Service: Cardiology;  Laterality: N/A;    BONE GRAFT Left     from Left hip to Left FA    eardrum reconstruction  1980    ELBOW SURGERY Left [id number]    FOREARM FRACTURE SURGERY Bilateral [id number]    multiple surgeries    HEART TRANSPLANT N/A 12/16/2019    Procedure: TRANSPLANT, HEART;  Surgeon: Talha Nuñez MD;  [facility];  Service: Cardiothoracic;  Laterality: N/A;    INSERTION OF GRAFT TO PERICARDIUM  9/10/2019    Procedure: INSERTION, GRAFT, PERICARDIUM;  Surgeon: Shar Walden MD;  [facility];  Service: Cardiovascular;;    INSERTION OF IMPLANTABLE CARDIOVERTER-DEFIBRILLATOR (ICD) GENERATOR WITH TWO EXISTING LEADS      INSERTION OF PACEMAKER Left     LEFT VENTRICULAR ASSIST DEVICE N/A 9/10/2019    Procedure: INSERTION-LEFT VENTRICULAR ASSIST DEVICE;  Surgeon: Shar Walden MD;  [facility];  Service: Cardiovascular;  Laterality: N/A;  DT HM3     LUMBAR FUSION  2007    L4-L5    RIGHT HEART CATHETERIZATION Right 9/4/2019    Procedure: INSERTION, CATHETER, RIGHT HEART;  Surgeon: Vi Bryant MD;  Location: [facility] CATH LAB;  Service: Cardiology;  Laterality: Right;    RIGHT HEART CATHETERIZATION N/A 11/18/2019    Procedure: INSERTION, CATHETER, RIGHT HEART;  Surgeon: Eric Antunez Jr., MD;  Location: [facility] CATH LAB;  Service: Cardiology;   Laterality: N/A;    RIGHT HEART CATHETERIZATION Right 12/31/2019    Procedure: INSERTION, CATHETER, RIGHT HEART;  Surgeon: Eric Antunez Jr., MD;  Location: [facility] CATH LAB;  Service: Cardiology;  Laterality: Right;    SINUS SURGERY Right 1994    with lymph nodes    STERNAL WOUND CLOSURE  9/10/2019    Procedure: CLOSURE, WOUND, STERNUM;  Surgeon: Shar Walden MD;  Location: [facility] OR [facility] FLR;  Service: Cardiovascular;;    TEMPOROMANDIBULAR JOINT SURGERY Right 1988    TONSILLECTOMY      TREATMENT OF CARDIAC ARRHYTHMIA N/A 9/24/2019    Procedure: CARDIOVERSION;  Surgeon: Moe Barrera MD;  Location: [facility] EP LAB;  Service: Cardiology;  Laterality: N/A;  AF, DCCV/NADINE, anes, DM, Rm 3073    TYMPANOSTOMY TUBE PLACEMENT  1971- 1979    multiple tube placements     Imaging reviewed with Radiology staff, Dr. Salazar.     Procedure: Thoracentesis    Scheduled Meds:    aspirin  81 mg Oral Daily    atorvastatin  20 mg Oral Daily    calcium-vitamin D3  2 tablet Oral Daily    famotidine  20 mg Oral Daily    ferrous sulfate  325 mg Oral BID    gabapentin  300 mg Oral QHS    heparin (porcine)  5,000 Units Subcutaneous Q12H    magnesium oxide  400 mg Oral Daily    mirtazapine  30 mg Oral QHS    mycophenolate  1,000 mg Oral BID    nystatin  500,000 Units Oral QID (WM & HS)    predniSONE  15 mg Oral Daily    SITagliptin  50 mg Oral Daily    [START ON 1/6/2020] sulfamethoxazole-trimethoprim 400-80mg  1 tablet Oral Every Mon, Wed, Fri    tacrolimus  1 mg Oral BID    theophylline  400 mg Oral Daily    valGANciclovir  450 mg Oral Daily    venlafaxine  150 mg Oral Daily     Continuous Infusions:    DOBUTamine 2.5 mcg/kg/min (01/04/20 0221)     PRN Meds:sodium chloride, acetaminophen, oxyCODONE, promethazine, senna-docusate 8.6-50 mg, sodium chloride 0.9%, zolpidem    Allergies:   Review of patient's allergies indicates:   Allergen Reactions    Adhesive Blisters     Reaction to area in chest and up only     Codeine Itching       Labs:  No results for input(s): INR in the last 168 hours.    Invalid input(s):  PT,  PTT    Recent Labs   Lab 01/04/20  0744   WBC 6.76   HGB 7.2*   HCT 23.4*   MCV 87         Recent Labs   Lab 12/30/19  0633  01/04/20  0744   *   < > 98      < > 135*   K 3.9   < > 3.8   CL 97   < > 93*   CO2 29   < > 31*   BUN 30*   < > 41*   CREATININE 1.4   < > 3.3*   CALCIUM 9.0   < > 8.5*   MG 1.5*   < > 3.0*   ALT 25   < > 12   AST 18   < > 9*   ALBUMIN 2.8*   < > 2.5*   BILITOT 0.6   < > 0.5   BILIDIR 0.3  --   --     < > = values in this interval not displayed.         Vitals (Most Recent):  Temp: 97.7 °F (36.5 °C) (01/04/20 0745)  Pulse: 98 (01/04/20 1245)  Resp: 18 (01/04/20 1245)  BP: 120/69 (01/04/20 1245)  SpO2: 95 % (01/04/20 1245)    Plan:   Recommend consultation with pulmonary medicine for bedside thoracentesis.      Potentially IR thoracentesis on Monday 1/6, can re-evaluate then.      Tristen Diaz MD  PGY-II Radiology

## 2020-01-04 NOTE — SUBJECTIVE & OBJECTIVE
Interval History: Still feeling sob, tranfused 1 unit PRBC and lasix gtt stopped. CVP 12, SVO2 75, CO 19.6, CI 8.6,  this morning. Held night dose of prograft due to worsening renal fxn. CT done which shows bilateral pleural effusions which could be contributing to her LEGER.     Continuous Infusions:   DOBUTamine 2.5 mcg/kg/min (01/04/20 0221)     Scheduled Meds:   aspirin  81 mg Oral Daily    atorvastatin  20 mg Oral Daily    calcium-vitamin D3  2 tablet Oral Daily    famotidine  20 mg Oral Daily    ferrous sulfate  325 mg Oral BID    gabapentin  300 mg Oral QHS    heparin (porcine)  5,000 Units Subcutaneous Q12H    magnesium oxide  400 mg Oral Daily    mirtazapine  30 mg Oral QHS    mycophenolate  1,000 mg Oral BID    nystatin  500,000 Units Oral QID (WM & HS)    predniSONE  15 mg Oral Daily    SITagliptin  50 mg Oral Daily    [START ON 1/6/2020] sulfamethoxazole-trimethoprim 400-80mg  1 tablet Oral Every Mon, Wed, Fri    tacrolimus  1 mg Oral BID    theophylline  400 mg Oral Daily    valGANciclovir  450 mg Oral Daily    venlafaxine  150 mg Oral Daily     PRN Meds:sodium chloride, acetaminophen, oxyCODONE, promethazine, senna-docusate 8.6-50 mg, sodium chloride 0.9%, zolpidem    Review of patient's allergies indicates:   Allergen Reactions    Adhesive Blisters     Reaction to area in chest and up only    Codeine Itching     Objective:     Vital Signs (Most Recent):  Temp: 97.7 °F (36.5 °C) (01/04/20 0745)  Pulse: 96 (01/04/20 0745)  Resp: 18 (01/04/20 0745)  BP: 129/74 (01/04/20 0745)  SpO2: (!) 93 % (01/04/20 0745) Vital Signs (24h Range):  Temp:  [97.3 °F (36.3 °C)-98.2 °F (36.8 °C)] 97.7 °F (36.5 °C)  Pulse:  [] 96  Resp:  [17-20] 18  SpO2:  [91 %-97 %] 93 %  BP: ()/(50-75) 129/74     Patient Vitals for the past 72 hrs (Last 3 readings):   Weight   01/03/20 0600 109 kg (240 lb 4.8 oz)   01/02/20 1201 106.1 kg (234 lb)   01/02/20 1130 106.4 kg (234 lb 9.1 oz)     Body mass  index is 36.54 kg/m².      Intake/Output Summary (Last 24 hours) at 1/4/2020 1123  Last data filed at 1/4/2020 0800  Gross per 24 hour   Intake 881.4 ml   Output 1285 ml   Net -403.6 ml       Hemodynamic Parameters:   see above    Telemetry: sinus     Physical Exam   Constitutional: She appears well-developed and well-nourished. No distress.   HENT:   Head: Normocephalic.   Eyes: Pupils are equal, round, and reactive to light. EOM are normal.   Neck:   Thick neck, unable to appreciate JVP, prominent EJ   Cardiovascular: Normal rate, regular rhythm and normal heart sounds.   No murmur heard.  Chest with midline surgical wound closed, no erythema or discharge.    Pulmonary/Chest: Effort normal. No respiratory distress.   Decreased BC bibasilar   Abdominal: Soft. She exhibits no distension. There is no tenderness.   Musculoskeletal:   Trace LE edema. R groin with clean closed wound, no discharge or erythema, small collection palpated, mildly tender   Skin: Skin is warm.     Significant Labs:  CBC:  Recent Labs   Lab 01/03/20  0540 01/03/20  2134 01/04/20  0744   WBC 8.93 7.57 6.76   RBC 2.48* 2.41* 2.70*   HGB 6.7* 6.5* 7.2*   HCT 22.1* 21.3* 23.4*    322 303   MCV 89 88 87   MCH 27.0 27.0 26.7*   MCHC 30.3* 30.5* 30.8*     BNP:  Recent Labs   Lab 12/30/19  1017 12/31/19  0730 01/02/20  0835   * 510* 594*     CMP:  Recent Labs   Lab 01/03/20  0540 01/03/20  1914 01/04/20  0744    231* 98   CALCIUM 8.3* 8.5* 8.5*   ALBUMIN 2.4* 2.5* 2.5*   PROT 5.4* 5.6* 5.5*    134* 135*   K 4.0 4.4 3.8   CO2 30* 30* 31*   CL 95 93* 93*   BUN 36* 40* 41*   CREATININE 2.6* 3.2* 3.3*   ALKPHOS 80 82 79   ALT 12 12 12   AST 10 9* 9*   BILITOT 0.5 0.4 0.5      Coagulation:   No results for input(s): PT, INR, APTT in the last 168 hours.  LDH:  No results for input(s): LDH in the last 72 hours.  Microbiology:  Microbiology Results (last 7 days)     ** No results found for the last 168 hours. **          I have  reviewed all pertinent labs within the past 24 hours.    Estimated Creatinine Clearance: 26.4 mL/min (A) (based on SCr of 3.3 mg/dL (H)).    Diagnostic Results:  I have reviewed and interpreted all pertinent imaging results/findings within the past 24 hours.

## 2020-01-04 NOTE — PLAN OF CARE
HTS Cross Cover:    Interval Update:    Vitals- Afebrile/ 102/ MAP 88 /Sat 95% hgb 6.5    Hemodynamics:  SVO2: 42  CVP: 11  CO: 8.1   CI: 3.56  SVR: 756  UOP: 500 in last 1 hr with post void of 27ml at most      Plan:  - stop prograf given rise in crt   - stop lasix gtt  - stat cbc and transfuse 1 RBC  - parrish for strict ins and outs  - stat CT chest/abdomen/pelvis    Discussed with Dr. Chaudhari.Will update once ct done    Christa Chen MD  Cardiology Fellow  PGY 4  Spectra - # [phone]

## 2020-01-04 NOTE — PLAN OF CARE
Pt is AAOx3.Pt needs stand by assistance only. Family at bedside. Pt is in good spirits.  Pt denies pian and/or discomfort at this time.Pt currently on strict I&O's and daily weight to monitor fluid status on a daily basis.Standard precautions maintained.  Telly monitoring on going. Pt turns independently, pt is aware of bony area and pressure reduction positions. CT done no acute changes. Lasix gtt stopped. Bladder scan done. x1 PRBC transfusing. VBG to be done after the unit is done. Morning labs to be drawn 1 hour post transfusion. Pt remains injury and fall free, non skid footwear donned, call light within reach, personal items within reach, bed in low/locked position, pt able to voice needs all needs voiced have been met at this time.

## 2020-01-04 NOTE — ASSESSMENT & PLAN NOTE
S/p OHTx on 12/16 for NICM, path revealed sarcoidosis. Now presenting with worsening LEGER, nausea and GLO. RHC on 12/31 with elevated filling pressures (RA 17, PCWP 15).  -RIJ line placed, initial CVP 14  -TTE revealed LVEF 6% and normal RV function, mild MR, mod-sev TR, IVC not seen  - lasix stopped overnight due to worsening renal function  -continue dobutamine 2.5mcg/kg/min  -Monitor CVP  -Monitor UOP, daily weight  -Monitor electrolytes and replete for target K>4 and Mg>2

## 2020-01-04 NOTE — ASSESSMENT & PLAN NOTE
S/p OHTx on 12/16 for NICM, path revealed sarcoidosis. Now presenting with worsening LEGER, nausea and GLO. RHC on 12/31 with elevated filling pressures (RA 17, PCWP 15).  - CVP 16 today initiated lasix 80mg TID with close monitoring of UOP and hemodynamics  -TTE revealed LVEF 6% and normal RV function, mild MR, mod-sev TR, IVC not seen  - given significant size in pleural effusions NPO @ midnight for IR to drain effusions tomorrow  -continue dobutamine 2.5mcg/kg/min  -Monitor CVP BID, SVO2  -Monitor UOP, daily weight  -Monitor electrolytes and replete for target K>4 and Mg>2

## 2020-01-04 NOTE — PROGRESS NOTES
[facility]  Heart Transplant  Progress Note    Patient Name: Deborah Navas  MRN: [id number]  Admission Date: 1/2/2020  Hospital Length of Stay: 2 days  Attending Physician: Miriam Prieto MD  Primary Care Provider: Valeria Verma MD  Principal Problem:GLO (acute kidney injury)    Subjective:     Interval History: Still feeling sob, tranfused 1 unit PRBC and lasix gtt stopped. CVP 12, SVO2 75, CO 19.6, CI 8.6,  this morning. Held night dose of prograft due to worsening renal fxn. CT done which shows bilateral pleural effusions which could be contributing to her LEGER. Hematoma stable in size. Still with nausea and sob    Continuous Infusions:   DOBUTamine 2.5 mcg/kg/min (01/04/20 0221)     Scheduled Meds:   aspirin  81 mg Oral Daily    atorvastatin  20 mg Oral Daily    calcium-vitamin D3  2 tablet Oral Daily    famotidine  20 mg Oral Daily    ferrous sulfate  325 mg Oral BID    gabapentin  300 mg Oral QHS    heparin (porcine)  5,000 Units Subcutaneous Q12H    magnesium oxide  400 mg Oral Daily    mirtazapine  30 mg Oral QHS    mycophenolate  1,000 mg Oral BID    nystatin  500,000 Units Oral QID (WM & HS)    predniSONE  15 mg Oral Daily    SITagliptin  50 mg Oral Daily    [START ON 1/6/2020] sulfamethoxazole-trimethoprim 400-80mg  1 tablet Oral Every Mon, Wed, Fri    tacrolimus  1 mg Oral BID    theophylline  400 mg Oral Daily    valGANciclovir  450 mg Oral Daily    venlafaxine  150 mg Oral Daily     PRN Meds:sodium chloride, acetaminophen, oxyCODONE, promethazine, senna-docusate 8.6-50 mg, sodium chloride 0.9%, zolpidem    Review of patient's allergies indicates:   Allergen Reactions    Adhesive Blisters     Reaction to area in chest and up only    Codeine Itching     Objective:     Vital Signs (Most Recent):  Temp: 97.7 °F (36.5 °C) (01/04/20 0745)  Pulse: 96 (01/04/20 0745)  Resp: 18 (01/04/20 0745)  BP: 129/74 (01/04/20 0745)  SpO2: (!) 93 % (01/04/20 0745)  Vital Signs (24h Range):  Temp:  [97.3 °F (36.3 °C)-98.2 °F (36.8 °C)] 97.7 °F (36.5 °C)  Pulse:  [] 96  Resp:  [17-20] 18  SpO2:  [91 %-97 %] 93 %  BP: ()/(50-75) 129/74     Patient Vitals for the past 72 hrs (Last 3 readings):   Weight   01/03/20 0600 109 kg (240 lb 4.8 oz)   01/02/20 1201 106.1 kg (234 lb)   01/02/20 1130 106.4 kg (234 lb 9.1 oz)     Body mass index is 36.54 kg/m².      Intake/Output Summary (Last 24 hours) at 1/4/2020 1123  Last data filed at 1/4/2020 0800  Gross per 24 hour   Intake 881.4 ml   Output 1285 ml   Net -403.6 ml       Hemodynamic Parameters:   see above    Telemetry: sinus     Physical Exam   Constitutional: She appears well-developed and well-nourished. No distress.   HENT:   Head: Normocephalic.   Eyes: Pupils are equal, round, and reactive to light. EOM are normal.   Neck:   Thick neck, unable to appreciate JVP, prominent EJ; RIJ line in place   Cardiovascular: Normal rate, regular rhythm and normal heart sounds.   No murmur heard.  Chest with midline surgical wound closed, no erythema or discharge.    Pulmonary/Chest: Effort normal. No respiratory distress.   Decreased BC bibasilar   Abdominal: Soft. She exhibits no distension. There is no tenderness.   Musculoskeletal:   Trace LE edema. R groin with clean closed wound, no discharge or erythema, small collection palpated, mildly tender   Skin: Skin is warm.     Significant Labs:  CBC:  Recent Labs   Lab 01/03/20  0540 01/03/20  2134 01/04/20  0744   WBC 8.93 7.57 6.76   RBC 2.48* 2.41* 2.70*   HGB 6.7* 6.5* 7.2*   HCT 22.1* 21.3* 23.4*    322 303   MCV 89 88 87   MCH 27.0 27.0 26.7*   MCHC 30.3* 30.5* 30.8*     BNP:  Recent Labs   Lab 12/30/19  1017 12/31/19  0730 01/02/20  0835   * 510* 594*     CMP:  Recent Labs   Lab 01/03/20  0540 01/03/20  1914 01/04/20  0744    231* 98   CALCIUM 8.3* 8.5* 8.5*   ALBUMIN 2.4* 2.5* 2.5*   PROT 5.4* 5.6* 5.5*    134* 135*   K 4.0 4.4 3.8   CO2 30* 30*  31*   CL 95 93* 93*   BUN 36* 40* 41*   CREATININE 2.6* 3.2* 3.3*   ALKPHOS 80 82 79   ALT 12 12 12   AST 10 9* 9*   BILITOT 0.5 0.4 0.5      Coagulation:   No results for input(s): PT, INR, APTT in the last 168 hours.  LDH:  No results for input(s): LDH in the last 72 hours.  Microbiology:  Microbiology Results (last 7 days)     ** No results found for the last 168 hours. **          I have reviewed all pertinent labs within the past 24 hours.    Estimated Creatinine Clearance: 26.4 mL/min (A) (based on SCr of 3.3 mg/dL (H)).    Diagnostic Results:  I have reviewed and interpreted all pertinent imaging results/findings within the past 24 hours.    Assessment and Plan:     48yo F s/p OHTx on 12/16/2019 for NICM/ HFrEF with HM3 on 9/2019, removed at time of transplant, as well as AFl post LVAD s/p NADINE/DCCV, VT/VF (some a/w hypokalemia), HLD, obesity, and OA, who presented for worsening LEGER and GLO. RHC on 12/31 with elevated filling pressures (RA 17, PCWP 15). Pathology of native heart revealed non-caseating granulomas typical of sarcoidosis. Presentation is likely c/w cardiorenal syndrome in the setting of ADHF/ fluid overload.          * GLO (acute kidney injury)  Likely cardiorenal syndrome with renal venous congestion  -IV diuresis and inotropes as above  -Monitor renal function and electrolytes  -Renally dose meds  -Avoid nephrotoxins    Status post heart transplant  - tacrolimus held overnight but resumed this morning at 1mg BID; continue follow levels   -Continue mycophenolate 1000mg BID  -Continue prednisone 20mg daily  -Continue TMP/SMX and valganciclovir  -Follow up EMB biopsy result from 12/31    Acute on chronic combined systolic and diastolic heart failure  S/p OHTx on 12/16 for NICM, path revealed sarcoidosis. Now presenting with worsening LEGER, nausea and GLO. RHC on 12/31 with elevated filling pressures (RA 17, PCWP 15).  -RIJ line placed, initial CVP 14  -TTE revealed LVEF 6% and normal RV function,  mild MR, mod-sev TR, IVC not seen  - lasix stopped overnight due to worsening renal function  - given significant size in pleural effusions will consult IR to eval for possible thoracentesis  -continue dobutamine 2.5mcg/kg/min  -Monitor CVP BID, SVO2  -Monitor UOP, daily weight  -Monitor electrolytes and replete for target K>4 and Mg>2        Christa Chen MD  Heart Transplant  [facility]

## 2020-01-05 LAB
ALBUMIN SERPL BCP-MCNC: 2.6 G/DL (ref 3.5–5.2)
ALLENS TEST: ABNORMAL
ALLENS TEST: ABNORMAL
ALP SERPL-CCNC: 83 U/L (ref 55–135)
ALT SERPL W/O P-5'-P-CCNC: 9 U/L (ref 10–44)
ANION GAP SERPL CALC-SCNC: 13 MMOL/L (ref 8–16)
ANION GAP SERPL CALC-SCNC: 13 MMOL/L (ref 8–16)
AST SERPL-CCNC: 8 U/L (ref 10–40)
BASOPHILS # BLD AUTO: 0.01 K/UL (ref 0–0.2)
BASOPHILS NFR BLD: 0.2 % (ref 0–1.9)
BILIRUB SERPL-MCNC: 0.5 MG/DL (ref 0.1–1)
BUN SERPL-MCNC: 38 MG/DL (ref 6–20)
BUN SERPL-MCNC: 40 MG/DL (ref 6–20)
CALCIUM SERPL-MCNC: 8.6 MG/DL (ref 8.7–10.5)
CALCIUM SERPL-MCNC: 8.7 MG/DL (ref 8.7–10.5)
CHLORIDE SERPL-SCNC: 95 MMOL/L (ref 95–110)
CHLORIDE SERPL-SCNC: 95 MMOL/L (ref 95–110)
CO2 SERPL-SCNC: 29 MMOL/L (ref 23–29)
CO2 SERPL-SCNC: 29 MMOL/L (ref 23–29)
CREAT SERPL-MCNC: 3.2 MG/DL (ref 0.5–1.4)
CREAT SERPL-MCNC: 3.3 MG/DL (ref 0.5–1.4)
DELSYS: ABNORMAL
DELSYS: ABNORMAL
DIFFERENTIAL METHOD: ABNORMAL
EOSINOPHIL # BLD AUTO: 0.1 K/UL (ref 0–0.5)
EOSINOPHIL NFR BLD: 1.2 % (ref 0–8)
ERYTHROCYTE [DISTWIDTH] IN BLOOD BY AUTOMATED COUNT: 16.8 % (ref 11.5–14.5)
EST. GFR  (AFRICAN AMERICAN): 17.9 ML/MIN/1.73 M^2
EST. GFR  (AFRICAN AMERICAN): 18.6 ML/MIN/1.73 M^2
EST. GFR  (NON AFRICAN AMERICAN): 15.5 ML/MIN/1.73 M^2
EST. GFR  (NON AFRICAN AMERICAN): 16.1 ML/MIN/1.73 M^2
GLUCOSE SERPL-MCNC: 103 MG/DL (ref 70–110)
GLUCOSE SERPL-MCNC: 162 MG/DL (ref 70–110)
HCO3 UR-SCNC: 29.3 MMOL/L (ref 24–28)
HCO3 UR-SCNC: 30.1 MMOL/L (ref 24–28)
HCT VFR BLD AUTO: 25.1 % (ref 37–48.5)
HGB BLD-MCNC: 7.7 G/DL (ref 12–16)
IMM GRANULOCYTES # BLD AUTO: 0.04 K/UL (ref 0–0.04)
IMM GRANULOCYTES NFR BLD AUTO: 0.6 % (ref 0–0.5)
LYMPHOCYTES # BLD AUTO: 0.1 K/UL (ref 1–4.8)
LYMPHOCYTES NFR BLD: 2.2 % (ref 18–48)
MAGNESIUM SERPL-MCNC: 2.6 MG/DL (ref 1.6–2.6)
MAGNESIUM SERPL-MCNC: 2.8 MG/DL (ref 1.6–2.6)
MCH RBC QN AUTO: 26.7 PG (ref 27–31)
MCHC RBC AUTO-ENTMCNC: 30.7 G/DL (ref 32–36)
MCV RBC AUTO: 87 FL (ref 82–98)
MODE: ABNORMAL
MODE: ABNORMAL
MONOCYTES # BLD AUTO: 0.3 K/UL (ref 0.3–1)
MONOCYTES NFR BLD: 4.2 % (ref 4–15)
NEUTROPHILS # BLD AUTO: 5.9 K/UL (ref 1.8–7.7)
NEUTROPHILS NFR BLD: 91.6 % (ref 38–73)
NRBC BLD-RTO: 0 /100 WBC
PCO2 BLDA: 34.6 MMHG (ref 35–45)
PCO2 BLDA: 39.1 MMHG (ref 35–45)
PH SMN: 7.49 [PH] (ref 7.35–7.45)
PH SMN: 7.54 [PH] (ref 7.35–7.45)
PLATELET # BLD AUTO: 360 K/UL (ref 150–350)
PMV BLD AUTO: 9.4 FL (ref 9.2–12.9)
PO2 BLDA: 31 MMHG (ref 40–60)
PO2 BLDA: 33 MMHG (ref 40–60)
POC BE: 7 MMOL/L
POC BE: 7 MMOL/L
POC SATURATED O2: 65 % (ref 95–100)
POC SATURATED O2: 73 % (ref 95–100)
POC TCO2: 30 MMOL/L (ref 24–29)
POC TCO2: 31 MMOL/L (ref 24–29)
POTASSIUM SERPL-SCNC: 3.9 MMOL/L (ref 3.5–5.1)
POTASSIUM SERPL-SCNC: 4.4 MMOL/L (ref 3.5–5.1)
PROT SERPL-MCNC: 5.6 G/DL (ref 6–8.4)
RBC # BLD AUTO: 2.88 M/UL (ref 4–5.4)
SAMPLE: ABNORMAL
SAMPLE: ABNORMAL
SITE: ABNORMAL
SITE: ABNORMAL
SODIUM SERPL-SCNC: 137 MMOL/L (ref 136–145)
SODIUM SERPL-SCNC: 137 MMOL/L (ref 136–145)
SP02: 95
TACROLIMUS BLD-MCNC: 10.1 NG/ML (ref 5–15)
WBC # BLD AUTO: 6.45 K/UL (ref 3.9–12.7)

## 2020-01-05 PROCEDURE — 85025 COMPLETE CBC W/AUTO DIFF WBC: CPT

## 2020-01-05 PROCEDURE — 83735 ASSAY OF MAGNESIUM: CPT | Mod: 91

## 2020-01-05 PROCEDURE — 20600001 HC STEP DOWN PRIVATE ROOM

## 2020-01-05 PROCEDURE — 80048 BASIC METABOLIC PNL TOTAL CA: CPT

## 2020-01-05 PROCEDURE — 63600175 PHARM REV CODE 636 W HCPCS: Performed by: INTERNAL MEDICINE

## 2020-01-05 PROCEDURE — 99900035 HC TECH TIME PER 15 MIN (STAT)

## 2020-01-05 PROCEDURE — 83735 ASSAY OF MAGNESIUM: CPT

## 2020-01-05 PROCEDURE — 25000003 PHARM REV CODE 250: Performed by: STUDENT IN AN ORGANIZED HEALTH CARE EDUCATION/TRAINING PROGRAM

## 2020-01-05 PROCEDURE — 63600175 PHARM REV CODE 636 W HCPCS: Performed by: STUDENT IN AN ORGANIZED HEALTH CARE EDUCATION/TRAINING PROGRAM

## 2020-01-05 PROCEDURE — 25000003 PHARM REV CODE 250: Performed by: INTERNAL MEDICINE

## 2020-01-05 PROCEDURE — 99233 PR SUBSEQUENT HOSPITAL CARE,LEVL III: ICD-10-PCS | Mod: ,,, | Performed by: INTERNAL MEDICINE

## 2020-01-05 PROCEDURE — 80053 COMPREHEN METABOLIC PANEL: CPT

## 2020-01-05 PROCEDURE — 82803 BLOOD GASES ANY COMBINATION: CPT

## 2020-01-05 PROCEDURE — 80197 ASSAY OF TACROLIMUS: CPT

## 2020-01-05 PROCEDURE — 94761 N-INVAS EAR/PLS OXIMETRY MLT: CPT

## 2020-01-05 PROCEDURE — 99233 SBSQ HOSP IP/OBS HIGH 50: CPT | Mod: ,,, | Performed by: INTERNAL MEDICINE

## 2020-01-05 RX ORDER — FUROSEMIDE 10 MG/ML
80 INJECTION INTRAMUSCULAR; INTRAVENOUS 3 TIMES DAILY
Status: DISCONTINUED | OUTPATIENT
Start: 2020-01-05 | End: 2020-01-08

## 2020-01-05 RX ORDER — TACROLIMUS 1 MG/1
2 CAPSULE ORAL 2 TIMES DAILY
Status: DISCONTINUED | OUTPATIENT
Start: 2020-01-05 | End: 2020-01-08

## 2020-01-05 RX ORDER — POLYETHYLENE GLYCOL 3350 17 G/17G
17 POWDER, FOR SOLUTION ORAL DAILY
Status: DISCONTINUED | OUTPATIENT
Start: 2020-01-05 | End: 2020-01-10 | Stop reason: HOSPADM

## 2020-01-05 RX ORDER — CALCIUM CARBONATE 200(500)MG
500 TABLET,CHEWABLE ORAL 3 TIMES DAILY PRN
Status: DISCONTINUED | OUTPATIENT
Start: 2020-01-05 | End: 2020-01-10 | Stop reason: HOSPADM

## 2020-01-05 RX ADMIN — POLYETHYLENE GLYCOL 3350 17 G: 17 POWDER, FOR SOLUTION ORAL at 09:01

## 2020-01-05 RX ADMIN — DOCUSATE SODIUM 50 MG: 50 CAPSULE, LIQUID FILLED ORAL at 09:01

## 2020-01-05 RX ADMIN — OXYCODONE HYDROCHLORIDE 10 MG: 10 TABLET ORAL at 11:01

## 2020-01-05 RX ADMIN — TACROLIMUS 2 MG: 1 CAPSULE ORAL at 05:01

## 2020-01-05 RX ADMIN — OXYCODONE HYDROCHLORIDE 10 MG: 10 TABLET ORAL at 05:01

## 2020-01-05 RX ADMIN — DOBUTAMINE IN DEXTROSE 2.5 MCG/KG/MIN: 200 INJECTION, SOLUTION INTRAVENOUS at 10:01

## 2020-01-05 RX ADMIN — FAMOTIDINE 20 MG: 20 TABLET ORAL at 08:01

## 2020-01-05 RX ADMIN — DOCUSATE SODIUM 50 MG: 50 CAPSULE, LIQUID FILLED ORAL at 03:01

## 2020-01-05 RX ADMIN — OYSTER SHELL CALCIUM WITH VITAMIN D 2 TABLET: 500; 200 TABLET, FILM COATED ORAL at 08:01

## 2020-01-05 RX ADMIN — NYSTATIN 500000 UNITS: 100000 SUSPENSION ORAL at 08:01

## 2020-01-05 RX ADMIN — HEPARIN SODIUM 5000 UNITS: 5000 INJECTION, SOLUTION INTRAVENOUS; SUBCUTANEOUS at 08:01

## 2020-01-05 RX ADMIN — TACROLIMUS 1 MG: 1 CAPSULE ORAL at 08:01

## 2020-01-05 RX ADMIN — FUROSEMIDE 80 MG: 10 INJECTION, SOLUTION INTRAMUSCULAR; INTRAVENOUS at 08:01

## 2020-01-05 RX ADMIN — Medication 400 MG: at 08:01

## 2020-01-05 RX ADMIN — ZOLPIDEM TARTRATE 5 MG: 5 TABLET ORAL at 10:01

## 2020-01-05 RX ADMIN — PROMETHAZINE HYDROCHLORIDE 25 MG: 25 TABLET ORAL at 03:01

## 2020-01-05 RX ADMIN — SITAGLIPTIN 50 MG: 50 TABLET, FILM COATED ORAL at 08:01

## 2020-01-05 RX ADMIN — ASPIRIN 81 MG: 81 TABLET, COATED ORAL at 08:01

## 2020-01-05 RX ADMIN — MIRTAZAPINE 30 MG: 15 TABLET, FILM COATED ORAL at 08:01

## 2020-01-05 RX ADMIN — VENLAFAXINE HYDROCHLORIDE 150 MG: 37.5 CAPSULE, EXTENDED RELEASE ORAL at 08:01

## 2020-01-05 RX ADMIN — DOCUSATE SODIUM 50 MG: 50 CAPSULE, LIQUID FILLED ORAL at 08:01

## 2020-01-05 RX ADMIN — VALGANCICLOVIR 450 MG: 450 TABLET, FILM COATED ORAL at 08:01

## 2020-01-05 RX ADMIN — NYSTATIN 500000 UNITS: 100000 SUSPENSION ORAL at 05:01

## 2020-01-05 RX ADMIN — FUROSEMIDE 80 MG: 10 INJECTION, SOLUTION INTRAMUSCULAR; INTRAVENOUS at 12:01

## 2020-01-05 RX ADMIN — CALCIUM CARBONATE (ANTACID) CHEW TAB 500 MG 500 MG: 500 CHEW TAB at 09:01

## 2020-01-05 RX ADMIN — FERROUS SULFATE TAB EC 325 MG (65 MG FE EQUIVALENT) 325 MG: 325 (65 FE) TABLET DELAYED RESPONSE at 08:01

## 2020-01-05 RX ADMIN — GABAPENTIN 300 MG: 300 CAPSULE ORAL at 08:01

## 2020-01-05 RX ADMIN — NYSTATIN 500000 UNITS: 100000 SUSPENSION ORAL at 12:01

## 2020-01-05 RX ADMIN — PREDNISONE 15 MG: 10 TABLET ORAL at 08:01

## 2020-01-05 RX ADMIN — ATORVASTATIN CALCIUM 20 MG: 20 TABLET, FILM COATED ORAL at 08:01

## 2020-01-05 RX ADMIN — THEOPHYLLINE ANHYDROUS 400 MG: 100 CAPSULE, EXTENDED RELEASE ORAL at 08:01

## 2020-01-05 RX ADMIN — OXYCODONE HYDROCHLORIDE 10 MG: 10 TABLET ORAL at 08:01

## 2020-01-05 RX ADMIN — MYCOPHENOLATE MOFETIL 1000 MG: 250 CAPSULE ORAL at 08:01

## 2020-01-05 NOTE — PROGRESS NOTES
[facility]  Heart Transplant  Progress Note    Patient Name: Deborah Navas  MRN: [id number]  Admission Date: 1/2/2020  Hospital Length of Stay: 3 days  Attending Physician: Miriam Prieto MD  Primary Care Provider: Valeria Verma MD  Principal Problem:GLO (acute kidney injury)    Subjective:     Interval History: No major issues overnight. Feels better overall but still having some tightness in lower back and chest. CVP up to 16 today.    Continuous Infusions:   DOBUTamine 2.5 mcg/kg/min (01/04/20 2131)     Scheduled Meds:   aspirin  81 mg Oral Daily    atorvastatin  20 mg Oral Daily    calcium-vitamin D3  2 tablet Oral Daily    docusate sodium  50 mg Oral TID    famotidine  20 mg Oral Daily    ferrous sulfate  325 mg Oral BID    furosemide  80 mg Intravenous TID    gabapentin  300 mg Oral QHS    heparin (porcine)  5,000 Units Subcutaneous Q12H    magnesium oxide  400 mg Oral Daily    mirtazapine  30 mg Oral QHS    mycophenolate  1,000 mg Oral BID    nystatin  500,000 Units Oral QID (WM & HS)    polyethylene glycol  17 g Oral Daily    predniSONE  15 mg Oral Daily    SITagliptin  50 mg Oral Daily    [START ON 1/6/2020] sulfamethoxazole-trimethoprim 400-80mg  1 tablet Oral Every Mon, Wed, Fri    tacrolimus  2 mg Oral BID    theophylline  400 mg Oral Daily    valGANciclovir  450 mg Oral Daily    venlafaxine  150 mg Oral Daily     PRN Meds:sodium chloride, acetaminophen, calcium carbonate, oxyCODONE, promethazine, senna-docusate 8.6-50 mg, sodium chloride 0.9%, zolpidem    Review of patient's allergies indicates:   Allergen Reactions    Adhesive Blisters     Reaction to area in chest and up only    Codeine Itching     Objective:     Vital Signs (Most Recent):  Temp: 98 °F (36.7 °C) (01/05/20 1154)  Pulse: 103 (01/05/20 1154)  Resp: 20 (01/05/20 1154)  BP: 111/68 (01/05/20 1154)  SpO2: 95 % (01/05/20 1154) Vital Signs (24h Range):  Temp:  [97.7 °F (36.5 °C)-98 °F  (36.7 °C)] 98 °F (36.7 °C)  Pulse:  [] 103  Resp:  [0-22] 20  SpO2:  [93 %-97 %] 95 %  BP: (111-146)/(68-78) 111/68     Patient Vitals for the past 72 hrs (Last 3 readings):   Weight   01/05/20 0555 105.1 kg (231 lb 11.3 oz)   01/03/20 0600 109 kg (240 lb 4.8 oz)     Body mass index is 35.23 kg/m².      Intake/Output Summary (Last 24 hours) at 1/5/2020 1416  Last data filed at 1/5/2020 0900  Gross per 24 hour   Intake 1101.47 ml   Output 1750 ml   Net -648.53 ml       Hemodynamic Parameters:  CVP:  [16 mmHg] 16 mmHg  CO: 14.6  SVO 73  CI 6.5      Telemetry: sinus    Physical Exam   Constitutional: She appears well-developed and well-nourished. No distress.   HENT:   Head: Normocephalic.   Eyes: Pupils are equal, round, and reactive to light. EOM are normal.   Neck:   Thick neck, unable to appreciate JVP, prominent EJ; RIJ line in place   Cardiovascular: Normal rate, regular rhythm and normal heart sounds.   No murmur heard.  Chest with midline surgical wound closed, no erythema or discharge.    Pulmonary/Chest: Effort normal. No respiratory distress.   Decreased BC bibasilar   Abdominal: Soft. She exhibits no distension. There is no tenderness.   Musculoskeletal:   Trace LE edema. R groin with clean closed wound, no discharge or erythema, small collection palpated, mildly tender   Skin: Skin is warm.     Significant Labs:  CBC:  Recent Labs   Lab 01/03/20  2134 01/04/20  0744 01/05/20  0430   WBC 7.57 6.76 6.45   RBC 2.41* 2.70* 2.88*   HGB 6.5* 7.2* 7.7*   HCT 21.3* 23.4* 25.1*    303 360*   MCV 88 87 87   MCH 27.0 26.7* 26.7*   MCHC 30.5* 30.8* 30.7*     BNP:  Recent Labs   Lab 12/30/19  1017 12/31/19  0730 01/02/20  0835   * 510* 594*     CMP:  Recent Labs   Lab 01/03/20  1914 01/04/20  0744 01/05/20  0430   * 98 103   CALCIUM 8.5* 8.5* 8.7   ALBUMIN 2.5* 2.5* 2.6*   PROT 5.6* 5.5* 5.6*   * 135* 137   K 4.4 3.8 3.9   CO2 30* 31* 29   CL 93* 93* 95   BUN 40* 41* 40*    CREATININE 3.2* 3.3* 3.2*   ALKPHOS 82 79 83   ALT 12 12 9*   AST 9* 9* 8*   BILITOT 0.4 0.5 0.5      Coagulation:   No results for input(s): PT, INR, APTT in the last 168 hours.  LDH:  No results for input(s): LDH in the last 72 hours.  Microbiology:  Microbiology Results (last 7 days)     ** No results found for the last 168 hours. **          I have reviewed all pertinent labs within the past 24 hours.    Estimated Creatinine Clearance: 26.7 mL/min (A) (based on SCr of 3.2 mg/dL (H)).    Diagnostic Results:  I have reviewed and interpreted all pertinent imaging results/findings within the past 24 hours.    Assessment and Plan:     50yo F s/p OHTx on 12/16/2019 for NICM/ HFrEF with HM3 on 9/2019, removed at time of transplant, as well as AFl post LVAD s/p NADINE/DCCV, VT/VF (some a/w hypokalemia), HLD, obesity, and OA, who presented for worsening LEGER and GLO. RHC on 12/31 with elevated filling pressures (RA 17, PCWP 15). Pathology of native heart revealed non-caseating granulomas typical of sarcoidosis. Presentation is likely c/w cardiorenal syndrome in the setting of ADHF/ fluid overload.          * GLO (acute kidney injury)  Likely cardiorenal syndrome with renal venous congestion  -IV diuresis and inotropes as above  -Monitor renal function and electrolytes  -Renally dose meds  -Avoid nephrotoxins    Status post heart transplant  - tacrolimus increased to 2mg/BID today, level 10.1 today  -Continue mycophenolate 1000mg BID  -Continue prednisone 20mg daily  -Continue TMP/SMX and valganciclovir  -Follow up EMB biopsy result from 12/31    Acute on chronic combined systolic and diastolic heart failure  S/p OHTx on 12/16 for NICM, path revealed sarcoidosis. Now presenting with worsening LEGER, nausea and GLO. RHC on 12/31 with elevated filling pressures (RA 17, PCWP 15).  - CVP 16 today initiated lasix 80mg TID with close monitoring of UOP and hemodynamics  -TTE revealed LVEF 6% and normal RV function, mild MR, mod-sev  TR, IVC not seen  - given significant size in pleural effusions NPO @ midnight for IR to drain effusions tomorrow  -continue dobutamine 2.5mcg/kg/min  -Monitor CVP BID, SVO2  -Monitor UOP, daily weight  -Monitor electrolytes and replete for target K>4 and Mg>2          Christa Chen MD  Heart Transplant  [facility]

## 2020-01-05 NOTE — PLAN OF CARE
* AAO x 4  * Cardiac diet patient tolerating well. See chart for % eaten.   * Generalized ecchymosis noted   * Generalized edema noted no pitting edema.  * Bed at lowest position and locked, call light within reach, non-slip socks on, friend at bedside, and side rails up x 2.  Encouraged patient to call for assistance when needed patient and friend stated understanding. This RN visualized patient ambulating in room gait and balance WNL.  * Healing mid-sternal incision site clean dry and intact no signs or symptoms of infection noted.  * Healed right groin hematoma site  * Healed right lower quad old drive line site  * Healing left chest wall old AICD site  * RIJ trialysis (1/2/20) patent, dressing clean dry and intact no signs or symptoms of infection noted. Dressing due to be changed on 1/9/20  * Dobutamine 2.5 mcg/kg/min DJ=841.1 kg infusing at 8 cc/hr rate and dose verified by this RN and CAROLINA Ortiz RN  * Oxygen saturation 100 % on room air.  Respirations even and unlabored no signs or symptoms of distress noted.  * Patient has no complaints of pain.   * Skin assessment preformed no breakdown noted.  * Standard precautions maintained.  * Continuous telemetry monitoring continued SR 90s.  * Patient able to turn self no assistance needed.  * Patient voiding clear yellow urine.  See flow sheet for intake and output totals.  * Visi continuous monitoring in use, see flow sheet for readings

## 2020-01-05 NOTE — ASSESSMENT & PLAN NOTE
- tacrolimus increased to 2mg/BID today, level 10.1 today  -Continue mycophenolate 1000mg BID  -Continue prednisone 20mg daily  -Continue TMP/SMX and valganciclovir  -Follow up EMB biopsy result from 12/31

## 2020-01-05 NOTE — SUBJECTIVE & OBJECTIVE
Interval History: No major issues overnight. Feels better overall but still having some tightness in lower back and chest. CVP up to 16 today.    Continuous Infusions:   DOBUTamine 2.5 mcg/kg/min (01/04/20 2131)     Scheduled Meds:   aspirin  81 mg Oral Daily    atorvastatin  20 mg Oral Daily    calcium-vitamin D3  2 tablet Oral Daily    docusate sodium  50 mg Oral TID    famotidine  20 mg Oral Daily    ferrous sulfate  325 mg Oral BID    furosemide  80 mg Intravenous TID    gabapentin  300 mg Oral QHS    heparin (porcine)  5,000 Units Subcutaneous Q12H    magnesium oxide  400 mg Oral Daily    mirtazapine  30 mg Oral QHS    mycophenolate  1,000 mg Oral BID    nystatin  500,000 Units Oral QID (WM & HS)    polyethylene glycol  17 g Oral Daily    predniSONE  15 mg Oral Daily    SITagliptin  50 mg Oral Daily    [START ON 1/6/2020] sulfamethoxazole-trimethoprim 400-80mg  1 tablet Oral Every Mon, Wed, Fri    tacrolimus  2 mg Oral BID    theophylline  400 mg Oral Daily    valGANciclovir  450 mg Oral Daily    venlafaxine  150 mg Oral Daily     PRN Meds:sodium chloride, acetaminophen, calcium carbonate, oxyCODONE, promethazine, senna-docusate 8.6-50 mg, sodium chloride 0.9%, zolpidem    Review of patient's allergies indicates:   Allergen Reactions    Adhesive Blisters     Reaction to area in chest and up only    Codeine Itching     Objective:     Vital Signs (Most Recent):  Temp: 98 °F (36.7 °C) (01/05/20 1154)  Pulse: 103 (01/05/20 1154)  Resp: 20 (01/05/20 1154)  BP: 111/68 (01/05/20 1154)  SpO2: 95 % (01/05/20 1154) Vital Signs (24h Range):  Temp:  [97.7 °F (36.5 °C)-98 °F (36.7 °C)] 98 °F (36.7 °C)  Pulse:  [] 103  Resp:  [0-22] 20  SpO2:  [93 %-97 %] 95 %  BP: (111-146)/(68-78) 111/68     Patient Vitals for the past 72 hrs (Last 3 readings):   Weight   01/05/20 0555 105.1 kg (231 lb 11.3 oz)   01/03/20 0600 109 kg (240 lb 4.8 oz)     Body mass index is 35.23 kg/m².      Intake/Output  Summary (Last 24 hours) at 1/5/2020 1416  Last data filed at 1/5/2020 0900  Gross per 24 hour   Intake 1101.47 ml   Output 1750 ml   Net -648.53 ml       Hemodynamic Parameters:  CVP:  [16 mmHg] 16 mmHg  CO: 14.6  SVO 73  CI 6.5      Telemetry: sinus    Physical Exam   Constitutional: She appears well-developed and well-nourished. No distress.   HENT:   Head: Normocephalic.   Eyes: Pupils are equal, round, and reactive to light. EOM are normal.   Neck:   Thick neck, unable to appreciate JVP, prominent EJ; RIJ line in place   Cardiovascular: Normal rate, regular rhythm and normal heart sounds.   No murmur heard.  Chest with midline surgical wound closed, no erythema or discharge.    Pulmonary/Chest: Effort normal. No respiratory distress.   Decreased BC bibasilar   Abdominal: Soft. She exhibits no distension. There is no tenderness.   Musculoskeletal:   Trace LE edema. R groin with clean closed wound, no discharge or erythema, small collection palpated, mildly tender   Skin: Skin is warm.     Significant Labs:  CBC:  Recent Labs   Lab 01/03/20  2134 01/04/20  0744 01/05/20  0430   WBC 7.57 6.76 6.45   RBC 2.41* 2.70* 2.88*   HGB 6.5* 7.2* 7.7*   HCT 21.3* 23.4* 25.1*    303 360*   MCV 88 87 87   MCH 27.0 26.7* 26.7*   MCHC 30.5* 30.8* 30.7*     BNP:  Recent Labs   Lab 12/30/19  1017 12/31/19  0730 01/02/20  0835   * 510* 594*     CMP:  Recent Labs   Lab 01/03/20  1914 01/04/20  0744 01/05/20  0430   * 98 103   CALCIUM 8.5* 8.5* 8.7   ALBUMIN 2.5* 2.5* 2.6*   PROT 5.6* 5.5* 5.6*   * 135* 137   K 4.4 3.8 3.9   CO2 30* 31* 29   CL 93* 93* 95   BUN 40* 41* 40*   CREATININE 3.2* 3.3* 3.2*   ALKPHOS 82 79 83   ALT 12 12 9*   AST 9* 9* 8*   BILITOT 0.4 0.5 0.5      Coagulation:   No results for input(s): PT, INR, APTT in the last 168 hours.  LDH:  No results for input(s): LDH in the last 72 hours.  Microbiology:  Microbiology Results (last 7 days)     ** No results found for the last 168  hours. **          I have reviewed all pertinent labs within the past 24 hours.    Estimated Creatinine Clearance: 26.7 mL/min (A) (based on SCr of 3.2 mg/dL (H)).    Diagnostic Results:  I have reviewed and interpreted all pertinent imaging results/findings within the past 24 hours.

## 2020-01-05 NOTE — PLAN OF CARE
Pt AAOx4, VSS on VISI monitor.  TELE monitoring in place, NSR to ST low 100s.  Pt on 2L nasal cannula PRN.  SOB noted with exertion.  Plan for pleural fluid evacuation tomorrow per IR. NPO at midnight.    C/o pain and nausea today.  Moderate relief after PRN oxycodone and phenergan.  Cardiac diet in place.  Pt only eating small amounts at this time. Boost supplement ordered per pts requests.  No accucheck orders.  RIJ trialysis CDI. Dobutamine gtt @ 2.5mcg/kg/min.  IV lasix 80 mg BID.   Urine output measured via hat.  Noted to have no output this am. Now improved - 700 cc - following administration of IV lasix.  No BM since episodes of diarrhea on 1/2.  Bowel regimen restarted - colace BID and miralax on board.  No skin breakdown noted.  Midsternal incision CDI, healing.    Standard precautions maintained. Fall precautions in place.  Nonskid socks on feet. Call bell in reach.  Pt instructed to call for any assistance needed. Understanding verbalized.  No acute events/falls/injuries.  See flowsheet for further assessment findings. Will monitor.

## 2020-01-06 LAB
ALBUMIN SERPL BCP-MCNC: 2.8 G/DL (ref 3.5–5.2)
ALLENS TEST: ABNORMAL
ALLENS TEST: ABNORMAL
ALP SERPL-CCNC: 90 U/L (ref 55–135)
ALT SERPL W/O P-5'-P-CCNC: 9 U/L (ref 10–44)
ANION GAP SERPL CALC-SCNC: 13 MMOL/L (ref 8–16)
AST SERPL-CCNC: 9 U/L (ref 10–40)
BASOPHILS # BLD AUTO: 0.01 K/UL (ref 0–0.2)
BASOPHILS NFR BLD: 0.2 % (ref 0–1.9)
BILIRUB SERPL-MCNC: 0.5 MG/DL (ref 0.1–1)
BUN SERPL-MCNC: 38 MG/DL (ref 6–20)
CALCIUM SERPL-MCNC: 8.8 MG/DL (ref 8.7–10.5)
CHLORIDE SERPL-SCNC: 95 MMOL/L (ref 95–110)
CO2 SERPL-SCNC: 30 MMOL/L (ref 23–29)
CREAT SERPL-MCNC: 3.2 MG/DL (ref 0.5–1.4)
DELSYS: ABNORMAL
DELSYS: ABNORMAL
DIFFERENTIAL METHOD: ABNORMAL
EOSINOPHIL # BLD AUTO: 0.1 K/UL (ref 0–0.5)
EOSINOPHIL NFR BLD: 1.6 % (ref 0–8)
ERYTHROCYTE [DISTWIDTH] IN BLOOD BY AUTOMATED COUNT: 16.6 % (ref 11.5–14.5)
ERYTHROCYTE [SEDIMENTATION RATE] IN BLOOD BY WESTERGREN METHOD: 17 MM/H
EST. GFR  (AFRICAN AMERICAN): 18.6 ML/MIN/1.73 M^2
EST. GFR  (NON AFRICAN AMERICAN): 16.1 ML/MIN/1.73 M^2
FIO2: 21
GLUCOSE SERPL-MCNC: 121 MG/DL (ref 70–110)
HCO3 UR-SCNC: 33.9 MMOL/L (ref 24–28)
HCO3 UR-SCNC: 34.3 MMOL/L (ref 24–28)
HCT VFR BLD AUTO: 24.7 % (ref 37–48.5)
HGB BLD-MCNC: 7.6 G/DL (ref 12–16)
IMM GRANULOCYTES # BLD AUTO: 0.04 K/UL (ref 0–0.04)
IMM GRANULOCYTES NFR BLD AUTO: 0.6 % (ref 0–0.5)
LYMPHOCYTES # BLD AUTO: 0.1 K/UL (ref 1–4.8)
LYMPHOCYTES NFR BLD: 2.2 % (ref 18–48)
MAGNESIUM SERPL-MCNC: 2.6 MG/DL (ref 1.6–2.6)
MCH RBC QN AUTO: 26.6 PG (ref 27–31)
MCHC RBC AUTO-ENTMCNC: 30.8 G/DL (ref 32–36)
MCV RBC AUTO: 86 FL (ref 82–98)
MODE: ABNORMAL
MODE: ABNORMAL
MONOCYTES # BLD AUTO: 0.4 K/UL (ref 0.3–1)
MONOCYTES NFR BLD: 6.1 % (ref 4–15)
NEUTROPHILS # BLD AUTO: 5.7 K/UL (ref 1.8–7.7)
NEUTROPHILS NFR BLD: 89.3 % (ref 38–73)
NRBC BLD-RTO: 0 /100 WBC
PCO2 BLDA: 40.9 MMHG (ref 35–45)
PCO2 BLDA: 42.9 MMHG (ref 35–45)
PH SMN: 7.51 [PH] (ref 7.35–7.45)
PH SMN: 7.53 [PH] (ref 7.35–7.45)
PLATELET # BLD AUTO: 338 K/UL (ref 150–350)
PMV BLD AUTO: 8.9 FL (ref 9.2–12.9)
PO2 BLDA: 24 MMHG (ref 40–60)
PO2 BLDA: 25 MMHG (ref 40–60)
POC BE: 11 MMOL/L
POC BE: 12 MMOL/L
POC SATURATED O2: 49 % (ref 95–100)
POC SATURATED O2: 53 % (ref 95–100)
POC TCO2: 35 MMOL/L (ref 24–29)
POC TCO2: 35 MMOL/L (ref 24–29)
POTASSIUM SERPL-SCNC: 3.5 MMOL/L (ref 3.5–5.1)
PROT SERPL-MCNC: 5.7 G/DL (ref 6–8.4)
RBC # BLD AUTO: 2.86 M/UL (ref 4–5.4)
SAMPLE: ABNORMAL
SAMPLE: ABNORMAL
SITE: ABNORMAL
SITE: ABNORMAL
SODIUM SERPL-SCNC: 138 MMOL/L (ref 136–145)
SP02: 96
TACROLIMUS BLD-MCNC: 7.2 NG/ML (ref 5–15)
WBC # BLD AUTO: 6.39 K/UL (ref 3.9–12.7)

## 2020-01-06 PROCEDURE — 93010 EKG 12-LEAD: ICD-10-PCS | Mod: ,,, | Performed by: INTERNAL MEDICINE

## 2020-01-06 PROCEDURE — 20600001 HC STEP DOWN PRIVATE ROOM

## 2020-01-06 PROCEDURE — 25000003 PHARM REV CODE 250: Performed by: STUDENT IN AN ORGANIZED HEALTH CARE EDUCATION/TRAINING PROGRAM

## 2020-01-06 PROCEDURE — 25000003 PHARM REV CODE 250: Performed by: INTERNAL MEDICINE

## 2020-01-06 PROCEDURE — 80197 ASSAY OF TACROLIMUS: CPT

## 2020-01-06 PROCEDURE — 94761 N-INVAS EAR/PLS OXIMETRY MLT: CPT

## 2020-01-06 PROCEDURE — 82803 BLOOD GASES ANY COMBINATION: CPT

## 2020-01-06 PROCEDURE — 63600175 PHARM REV CODE 636 W HCPCS: Performed by: INTERNAL MEDICINE

## 2020-01-06 PROCEDURE — 93010 ELECTROCARDIOGRAM REPORT: CPT | Mod: ,,, | Performed by: INTERNAL MEDICINE

## 2020-01-06 PROCEDURE — 80053 COMPREHEN METABOLIC PANEL: CPT

## 2020-01-06 PROCEDURE — 99233 SBSQ HOSP IP/OBS HIGH 50: CPT | Mod: ,,, | Performed by: INTERNAL MEDICINE

## 2020-01-06 PROCEDURE — 85025 COMPLETE CBC W/AUTO DIFF WBC: CPT

## 2020-01-06 PROCEDURE — 83735 ASSAY OF MAGNESIUM: CPT

## 2020-01-06 PROCEDURE — 99900035 HC TECH TIME PER 15 MIN (STAT)

## 2020-01-06 PROCEDURE — 99000 SPECIMEN HANDLING OFFICE-LAB: CPT

## 2020-01-06 PROCEDURE — 63600175 PHARM REV CODE 636 W HCPCS: Performed by: STUDENT IN AN ORGANIZED HEALTH CARE EDUCATION/TRAINING PROGRAM

## 2020-01-06 PROCEDURE — 93005 ELECTROCARDIOGRAM TRACING: CPT

## 2020-01-06 PROCEDURE — 99233 PR SUBSEQUENT HOSPITAL CARE,LEVL III: ICD-10-PCS | Mod: ,,, | Performed by: INTERNAL MEDICINE

## 2020-01-06 RX ORDER — VALGANCICLOVIR 450 MG/1
450 TABLET, FILM COATED ORAL EVERY OTHER DAY
Status: DISCONTINUED | OUTPATIENT
Start: 2020-01-07 | End: 2020-01-10 | Stop reason: HOSPADM

## 2020-01-06 RX ORDER — CYCLOBENZAPRINE HCL 5 MG
5 TABLET ORAL ONCE
Status: COMPLETED | OUTPATIENT
Start: 2020-01-06 | End: 2020-01-06

## 2020-01-06 RX ORDER — CYCLOBENZAPRINE HCL 5 MG
5 TABLET ORAL 3 TIMES DAILY PRN
Status: DISCONTINUED | OUTPATIENT
Start: 2020-01-06 | End: 2020-01-10 | Stop reason: HOSPADM

## 2020-01-06 RX ORDER — AMLODIPINE BESYLATE 5 MG/1
5 TABLET ORAL DAILY
Status: DISCONTINUED | OUTPATIENT
Start: 2020-01-06 | End: 2020-01-07

## 2020-01-06 RX ADMIN — SULFAMETHOXAZOLE AND TRIMETHOPRIM 1 TABLET: 400; 80 TABLET ORAL at 08:01

## 2020-01-06 RX ADMIN — MENTHOL, METHYL SALICYLATE: 10; 15 CREAM TOPICAL at 05:01

## 2020-01-06 RX ADMIN — THEOPHYLLINE ANHYDROUS 400 MG: 100 CAPSULE, EXTENDED RELEASE ORAL at 08:01

## 2020-01-06 RX ADMIN — MIRTAZAPINE 30 MG: 15 TABLET, FILM COATED ORAL at 08:01

## 2020-01-06 RX ADMIN — FERROUS SULFATE TAB EC 325 MG (65 MG FE EQUIVALENT) 325 MG: 325 (65 FE) TABLET DELAYED RESPONSE at 08:01

## 2020-01-06 RX ADMIN — ATORVASTATIN CALCIUM 20 MG: 20 TABLET, FILM COATED ORAL at 08:01

## 2020-01-06 RX ADMIN — VENLAFAXINE HYDROCHLORIDE 150 MG: 37.5 CAPSULE, EXTENDED RELEASE ORAL at 08:01

## 2020-01-06 RX ADMIN — GABAPENTIN 300 MG: 300 CAPSULE ORAL at 08:01

## 2020-01-06 RX ADMIN — SENNOSIDES AND DOCUSATE SODIUM 2 TABLET: 8.6; 5 TABLET ORAL at 08:01

## 2020-01-06 RX ADMIN — CYCLOBENZAPRINE HYDROCHLORIDE 5 MG: 5 TABLET, FILM COATED ORAL at 01:01

## 2020-01-06 RX ADMIN — NYSTATIN 500000 UNITS: 100000 SUSPENSION ORAL at 08:01

## 2020-01-06 RX ADMIN — PROMETHAZINE HYDROCHLORIDE 25 MG: 25 TABLET ORAL at 08:01

## 2020-01-06 RX ADMIN — HEPARIN SODIUM 5000 UNITS: 5000 INJECTION, SOLUTION INTRAVENOUS; SUBCUTANEOUS at 08:01

## 2020-01-06 RX ADMIN — AMLODIPINE BESYLATE 5 MG: 5 TABLET ORAL at 01:01

## 2020-01-06 RX ADMIN — TACROLIMUS 2 MG: 1 CAPSULE ORAL at 05:01

## 2020-01-06 RX ADMIN — OXYCODONE HYDROCHLORIDE 10 MG: 10 TABLET ORAL at 08:01

## 2020-01-06 RX ADMIN — OYSTER SHELL CALCIUM WITH VITAMIN D 2 TABLET: 500; 200 TABLET, FILM COATED ORAL at 08:01

## 2020-01-06 RX ADMIN — MYCOPHENOLATE MOFETIL 1000 MG: 250 CAPSULE ORAL at 08:01

## 2020-01-06 RX ADMIN — FUROSEMIDE 80 MG: 10 INJECTION, SOLUTION INTRAMUSCULAR; INTRAVENOUS at 02:01

## 2020-01-06 RX ADMIN — VALGANCICLOVIR 450 MG: 450 TABLET, FILM COATED ORAL at 08:01

## 2020-01-06 RX ADMIN — PREDNISONE 15 MG: 10 TABLET ORAL at 08:01

## 2020-01-06 RX ADMIN — OXYCODONE HYDROCHLORIDE 10 MG: 10 TABLET ORAL at 09:01

## 2020-01-06 RX ADMIN — ASPIRIN 81 MG: 81 TABLET, COATED ORAL at 08:01

## 2020-01-06 RX ADMIN — Medication 400 MG: at 08:01

## 2020-01-06 RX ADMIN — NYSTATIN 500000 UNITS: 100000 SUSPENSION ORAL at 05:01

## 2020-01-06 RX ADMIN — DOCUSATE SODIUM 50 MG: 50 CAPSULE, LIQUID FILLED ORAL at 01:01

## 2020-01-06 RX ADMIN — POLYETHYLENE GLYCOL 3350 17 G: 17 POWDER, FOR SOLUTION ORAL at 08:01

## 2020-01-06 RX ADMIN — SITAGLIPTIN 50 MG: 50 TABLET, FILM COATED ORAL at 08:01

## 2020-01-06 RX ADMIN — FUROSEMIDE 80 MG: 10 INJECTION, SOLUTION INTRAMUSCULAR; INTRAVENOUS at 08:01

## 2020-01-06 RX ADMIN — NYSTATIN 500000 UNITS: 100000 SUSPENSION ORAL at 01:01

## 2020-01-06 RX ADMIN — TACROLIMUS 2 MG: 1 CAPSULE ORAL at 08:01

## 2020-01-06 RX ADMIN — DOCUSATE SODIUM 50 MG: 50 CAPSULE, LIQUID FILLED ORAL at 08:01

## 2020-01-06 RX ADMIN — FAMOTIDINE 20 MG: 20 TABLET ORAL at 08:01

## 2020-01-06 RX ADMIN — ZOLPIDEM TARTRATE 5 MG: 5 TABLET ORAL at 09:01

## 2020-01-06 RX ADMIN — DOCUSATE SODIUM 50 MG: 50 CAPSULE, LIQUID FILLED ORAL at 09:01

## 2020-01-06 NOTE — PROGRESS NOTES
[facility]  Heart Transplant  Progress Note    Patient Name: Deborah Navas  MRN: [id number]  Admission Date: 1/2/2020  Hospital Length of Stay: 4 days  Attending Physician: Dr. Lua  Primary Care Provider: Valeria Verma MD  Principal Problem:GLO (acute kidney injury)    Subjective:     Interval events:  Patient was seen and examined this morning at bedside. No acute overnight events. This morning she reports feeling okay, just slighly better than on admission, however, complains of worsening shoulder R>L pain, particularly with any movement of her R arm. Also c/o severe tremors in all 4 extremities. She underwent R thoracentesis this AM by IR, draining 600cc of serous fluid.       Past Medical History:   Diagnosis Date    Fractures     History of ventricular fibrillation 9/4/2019    History of ventricular tachycardia 9/4/2019    Hyperlipidemia     Migraine     Osteoarthritis        Past Surgical History:   Procedure Laterality Date    BACK SURGERY      2007    BIOPSY WITH ULTRASOUND GUIDANCE N/A 12/31/2019    Procedure: BIOPSY, WITH US GUIDANCE;  Surgeon: Eric Antunez Jr., MD;  Location: [facility] CATH LAB;  Service: Cardiology;  Laterality: N/A;    BONE GRAFT Left     from Left hip to Left FA    eardrum reconstruction  1980    ELBOW SURGERY Left [id number]    FOREARM FRACTURE SURGERY Bilateral [id number]    multiple surgeries    HEART TRANSPLANT N/A 12/16/2019    Procedure: TRANSPLANT, HEART;  Surgeon: Talha Nuñez MD;  Location: [facility] OR [address] [place] [address];  Service: Cardiothoracic;  Laterality: N/A;    INSERTION OF GRAFT TO PERICARDIUM  9/10/2019    Procedure: INSERTION, GRAFT, PERICARDIUM;  Surgeon: Shar Walden MD;  Location: [facility] OR [address] [place] [address];  Service: Cardiovascular;;    INSERTION OF IMPLANTABLE CARDIOVERTER-DEFIBRILLATOR (ICD) GENERATOR WITH TWO EXISTING LEADS      INSERTION OF PACEMAKER Left     LEFT VENTRICULAR ASSIST DEVICE N/A 9/10/2019    Procedure:  INSERTION-LEFT VENTRICULAR ASSIST DEVICE;  Surgeon: Shar Walden MD;  Location: [facility] OR [facility] FLR;  Service: Cardiovascular;  Laterality: N/A;  DT HM3     LUMBAR FUSION  2007    L4-L5    RIGHT HEART CATHETERIZATION Right 9/4/2019    Procedure: INSERTION, CATHETER, RIGHT HEART;  Surgeon: Vi Bryant MD;  Location: [facility] CATH LAB;  Service: Cardiology;  Laterality: Right;    RIGHT HEART CATHETERIZATION N/A 11/18/2019    Procedure: INSERTION, CATHETER, RIGHT HEART;  Surgeon: Eric Antunez Jr., MD;  Location: [facility] CATH LAB;  Service: Cardiology;  Laterality: N/A;    RIGHT HEART CATHETERIZATION Right 12/31/2019    Procedure: INSERTION, CATHETER, RIGHT HEART;  Surgeon: Eric Antunez Jr., MD;  Location: [facility] CATH LAB;  Service: Cardiology;  Laterality: Right;    SINUS SURGERY Right 1994    with lymph nodes    STERNAL WOUND CLOSURE  9/10/2019    Procedure: CLOSURE, WOUND, STERNUM;  Surgeon: Shar Walden MD;  [facility];  Service: Cardiovascular;;    TEMPOROMANDIBULAR JOINT SURGERY Right 1988    TONSILLECTOMY      TREATMENT OF CARDIAC ARRHYTHMIA N/A 9/24/2019    Procedure: CARDIOVERSION;  Surgeon: Moe Barrera MD;  Location: [facility] EP LAB;  Service: Cardiology;  Laterality: N/A;  AF, DCCV/NADINE, anes, DM, Rm 3073    TYMPANOSTOMY TUBE PLACEMENT  1971- 1979    multiple tube placements       Review of patient's allergies indicates:   Allergen Reactions    Adhesive Blisters     Reaction to area in chest and up only    Codeine Itching       Current Facility-Administered Medications   Medication    0.9%  NaCl infusion (for blood administration)    acetaminophen tablet 650 mg    aspirin EC tablet 81 mg    atorvastatin tablet 20 mg    calcium carbonate 200 mg calcium (500 mg) chewable tablet 500 mg    calcium-vitamin D3 500 mg(1,250mg) -200 unit per tablet 2 tablet    cyclobenzaprine tablet 5 mg    cyclobenzaprine tablet 5 mg    DOBUTamine 500mg in D5W 250mL infusion (premix)  (NON-TITRATING)    docusate sodium capsule 50 mg    famotidine tablet 20 mg    ferrous sulfate EC tablet 325 mg    furosemide injection 80 mg    gabapentin capsule 300 mg    heparin (porcine) injection 5,000 Units    magnesium oxide tablet 400 mg    mirtazapine tablet 30 mg    mycophenolate capsule 1,000 mg    nystatin 100,000 unit/mL suspension 500,000 Units    oxyCODONE immediate release tablet Tab 10 mg    polyethylene glycol packet 17 g    predniSONE tablet 15 mg    promethazine tablet 25 mg    senna-docusate 8.6-50 mg per tablet 2 tablet    SITagliptin tablet 50 mg    sodium chloride 0.9% flush 10 mL    sulfamethoxazole-trimethoprim 400-80mg per tablet 1 tablet    tacrolimus capsule 2 mg    theophylline 24 hr capsule 400 mg    [START ON 1/7/2020] valGANciclovir tablet 450 mg    venlafaxine 24 hr capsule 150 mg    zolpidem tablet 5 mg     Family History     Problem Relation (Age of Onset)    Broken bones Maternal Grandmother    Cancer Paternal Grandmother    Dislocations Maternal Grandmother    Heart failure Paternal Grandfather, Maternal Grandfather    Migraines Mother, Maternal Grandmother, Paternal Grandmother, Paternal Grandfather, Maternal Grandfather    Obesity Paternal Grandmother    Osteoarthritis Mother, Maternal Grandmother, Paternal Grandmother, Paternal Grandfather, Maternal Grandfather, Father    Osteoporosis Maternal Grandmother    Scoliosis Maternal Grandmother    Stroke Father        Tobacco Use    Smoking status: Never Smoker    Smokeless tobacco: Never Used   Substance and Sexual Activity    Alcohol use: No    Drug use: No    Sexual activity: Not Currently     Review of Systems   All other systems reviewed and are negative.    Objective:     Vital Signs (Most Recent):  Temp: 98.2 °F (36.8 °C) (01/06/20 0807)  Pulse: 98 (01/06/20 1123)  Resp: 18 (01/06/20 0807)  BP: 106/73 (01/06/20 0807)  SpO2: 96 % (01/06/20 0807) Vital Signs (24h Range):  Temp:  [97.4 °F (36.3  °C)-98.2 °F (36.8 °C)] 98.2 °F (36.8 °C)  Pulse:  [] 98  Resp:  [11-18] 18  SpO2:  [93 %-97 %] 96 %  BP: (106-148)/(57-73) 106/73     Patient Vitals for the past 72 hrs (Last 3 readings):   Weight   01/05/20 0555 105.1 kg (231 lb 11.3 oz)     Body mass index is 35.23 kg/m².      Intake/Output Summary (Last 24 hours) at 1/6/2020 1240  Last data filed at 1/6/2020 1100  Gross per 24 hour   Intake 1272 ml   Output 4150 ml   Net -2878 ml       Physical Exam   Constitutional: She appears well-developed and well-nourished. No distress.   HENT:   Head: Normocephalic.   Eyes: Pupils are equal, round, and reactive to light. EOM are normal.   Neck:   Thick neck, unable to appreciate JVP, prominent EJ   Cardiovascular: Normal rate, regular rhythm and normal heart sounds.   No murmur heard.  Chest with midline surgical wound closed, no erythema or discharge.    Pulmonary/Chest: Effort normal and breath sounds normal. No respiratory distress.   Abdominal: Soft. She exhibits no distension. There is no tenderness.   Musculoskeletal:   Trace LE edema. R groin with clean closed wound, no discharge or erythema, small collection palpated, mildly tender   Skin: Skin is warm.       Significant Labs:  CBC:  Recent Labs   Lab 01/04/20  0744 01/05/20  0430 01/06/20  0430   WBC 6.76 6.45 6.39   RBC 2.70* 2.88* 2.86*   HGB 7.2* 7.7* 7.6*   HCT 23.4* 25.1* 24.7*    360* 338   MCV 87 87 86   MCH 26.7* 26.7* 26.6*   MCHC 30.8* 30.7* 30.8*     BNP:  Recent Labs   Lab 12/31/19  0730 01/02/20  0835   * 594*     CMP:  Recent Labs   Lab 01/04/20  0744 01/05/20  0430 01/05/20 2027 01/06/20  0430   GLU 98 103 162* 121*   CALCIUM 8.5* 8.7 8.6* 8.8   ALBUMIN 2.5* 2.6*  --  2.8*   PROT 5.5* 5.6*  --  5.7*   * 137 137 138   K 3.8 3.9 4.4 3.5   CO2 31* 29 29 30*   CL 93* 95 95 95   BUN 41* 40* 38* 38*   CREATININE 3.3* 3.2* 3.3* 3.2*   ALKPHOS 79 83  --  90   ALT 12 9*  --  9*   AST 9* 8*  --  9*   BILITOT 0.5 0.5  --  0.5       Coagulation:   No results for input(s): PT, INR, APTT in the last 168 hours.  LDH:  No results for input(s): LDH in the last 72 hours.  Microbiology:  Microbiology Results (last 7 days)     ** No results found for the last 168 hours. **          I have reviewed all pertinent labs within the past 24 hours.        Assessment and Plan:     50yo F s/p OHTx on 12/16/2019 for NICM/ HFrEF with HM3 on 9/2019, removed at time of transplant, as well as AFl post LVAD s/p NADINE/DCCV, VT/VF (some a/w hypokalemia), HLD, obesity, and OA, who presented for worsening LEGER and GLO. RHC on 12/31 with elevated filling pressures (RA 17, PCWP 15). Pathology of native heart revealed non-caseating granulomas typical of sarcoidosis. Presentation is likely c/w cardiorenal syndrome in the setting of ADHF/ fluid overload.          Acute on chronic diastolic heart failure- NYHA III, hypervolemic   S/p OHTx on 12/16 for NICM, path revealed sarcoidosis. Now presenting with worsening LEGER, nausea and GLO. RHC on 12/31 with elevated filling pressures (RA 17, PCWP 15).  - CVP today 16  - TTE revealed LVEF 65% and normal RV function, mild MR, mod-sev TR, IVC not seen; no pericardial effusion appreciated  - Continue furosemide 80mg IV TID  - Continue dobutamine 2.5mcg/kg/min for now  - Monitor CVP  - Monitor UOP, daily weight  - Monitor electrolytes and replete for target K>4 and Mg>2  - Plan for RHC/EMB tomorrow     Status post heart transplant  - Tacrolimus level slightly elevated on admission, today 7.2 (goal ~8)--> continue 2mg PO BID  - Continue mycophenolate 1000mg BID  - Continue prednisone 15mg daily  - Continue TMP/SMX and valganciclovir  - EMB from 12/31negative for Ab or cell-mediated rejection; next EMB tomorrow  - Path of native heart revealed sarcoidosis, will need close f/u and possible chest/ extracardiac imaging     * GLO (acute kidney injury)  Likely cardiorenal syndrome with renal venous congestion  - IV diuresis and inotropes as  above  - Monitor renal function and electrolytes  - Renally dose meds  - Avoid nephrotoxins  - UA WNL     * Normocytic anemia  - Iron panel c/w LYN  - Continue ferrous sulfate  - Monitor HB      * R groin fluid collection- seroma vs hematoma      - Repeat R groin US with shows decreased size of fluid collection      - Continue monitoring     * Chronic pain      - Continue gabapentin      - Continue Tylenol and oxycodone PRN    *Shoulder pain- unclear etiology, likely MSK      -Continue analgesic PRN      - Trial of Flexeril      - Will consider Ortho eval if no improvement     * Miscellaneous      - Diet: Cardiac      - DVT ppx: Heparin SQ      - Code status: Full code    Triny Yusuf MD  Heart Transplant  [facility]

## 2020-01-06 NOTE — NURSING
Ref. Range 1/5/2020 20:27   Sodium Latest Ref Range: 136 - 145 mmol/L 137   Potassium Latest Ref Range: 3.5 - 5.1 mmol/L 4.4   Chloride Latest Ref Range: 95 - 110 mmol/L 95   CO2 Latest Ref Range: 23 - 29 mmol/L 29   Anion Gap Latest Ref Range: 8 - 16 mmol/L 13   BUN, Bld Latest Ref Range: 6 - 20 mg/dL 38 (H)   Creatinine Latest Ref Range: 0.5 - 1.4 mg/dL 3.3 (H)   eGFR if non African American Latest Ref Range: >60 mL/min/1.73 m^2 15.5 (A)   eGFR if African American Latest Ref Range: >60 mL/min/1.73 m^2 17.9 (A)   Glucose Latest Ref Range: 70 - 110 mg/dL 162 (H)   Calcium Latest Ref Range: 8.7 - 10.5 mg/dL 8.6 (L)   Magnesium Latest Ref Range: 1.6 - 2.6 mg/dL 2.6     Notified MD on call of above will monitor.

## 2020-01-06 NOTE — NURSING
Ref. Range 1/5/2020 20:11   POC PH Latest Ref Range: 7.35 - 7.45  7.494 (H)   POC PCO2 Latest Ref Range: 35 - 45 mmHg 39.1   POC PO2 Latest Ref Range: 40 - 60 mmHg 31 (LL)   POC BE Latest Ref Range: -2 to 2 mmol/L 7   POC HCO3 Latest Ref Range: 24 - 28 mmol/L 30.1 (H)   POC SATURATED O2 Latest Ref Range: 95 - 100 % 65 (L)   POC TCO2 Latest Ref Range: 24 - 29 mmol/L 31 (H)   Sample Unknown VENOUS   DelSys Unknown Room Air   Allens Test Unknown N/A   Site Unknown Other   Mode Unknown SPONT        01/05/20 2000   Invasive Hemodynamic Monitoring   CVP (mmHg) 18 mmHg     notified MD of above levels

## 2020-01-06 NOTE — SUBJECTIVE & OBJECTIVE
Interval events:  Patient was seen and examined this morning at bedside. No acute overnight events. This morning she reports feeling okay, just slighly better than on admission, however, complains of worsening shoulder R>L pain, particularly with any movement of her R arm. She underwent R thoracentesis this AM by IR, draining 600cc of serous fluid.       Past Medical History:   Diagnosis Date    Fractures     History of ventricular fibrillation 9/4/2019    History of ventricular tachycardia 9/4/2019    Hyperlipidemia     Migraine     Osteoarthritis        Past Surgical History:   Procedure Laterality Date    BACK SURGERY      2007    BIOPSY WITH ULTRASOUND GUIDANCE N/A 12/31/2019    Procedure: BIOPSY, WITH US GUIDANCE;  Surgeon: Eric Antunez Jr., MD;  Location: [facility] CATH LAB;  Service: Cardiology;  Laterality: N/A;    BONE GRAFT Left     from Left hip to Left FA    eardrum reconstruction  1980    ELBOW SURGERY Left [id number]    FOREARM FRACTURE SURGERY Bilateral [id number]    multiple surgeries    HEART TRANSPLANT N/A 12/16/2019    Procedure: TRANSPLANT, HEART;  Surgeon: Talha Nuñez MD;  Location: [facility] OR [address] [place] [address];  Service: Cardiothoracic;  Laterality: N/A;    INSERTION OF GRAFT TO PERICARDIUM  9/10/2019    Procedure: INSERTION, GRAFT, PERICARDIUM;  Surgeon: Shar Walden MD;  [facility];  Service: Cardiovascular;;    INSERTION OF IMPLANTABLE CARDIOVERTER-DEFIBRILLATOR (ICD) GENERATOR WITH TWO EXISTING LEADS      INSERTION OF PACEMAKER Left     LEFT VENTRICULAR ASSIST DEVICE N/A 9/10/2019    Procedure: INSERTION-LEFT VENTRICULAR ASSIST DEVICE;  Surgeon: Shar Walden MD;  [facility];  Service: Cardiovascular;  Laterality: N/A;  DT HM3     LUMBAR FUSION  2007    L4-L5    RIGHT HEART CATHETERIZATION Right 9/4/2019    Procedure: INSERTION, CATHETER, RIGHT HEART;  Surgeon: Vi Bryant MD;  Location: [facility] CATH LAB;  Service: Cardiology;  Laterality:  Right;    RIGHT HEART CATHETERIZATION N/A 11/18/2019    Procedure: INSERTION, CATHETER, RIGHT HEART;  Surgeon: Eric Antunez Jr., MD;  Location: [facility] CATH LAB;  Service: Cardiology;  Laterality: N/A;    RIGHT HEART CATHETERIZATION Right 12/31/2019    Procedure: INSERTION, CATHETER, RIGHT HEART;  Surgeon: Eric Antunez Jr., MD;  Location: [facility] CATH LAB;  Service: Cardiology;  Laterality: Right;    SINUS SURGERY Right 1994    with lymph nodes    STERNAL WOUND CLOSURE  9/10/2019    Procedure: CLOSURE, WOUND, STERNUM;  Surgeon: Shar Walden MD;  Location: [facility] OR [facility] FLR;  Service: Cardiovascular;;    TEMPOROMANDIBULAR JOINT SURGERY Right 1988    TONSILLECTOMY      TREATMENT OF CARDIAC ARRHYTHMIA N/A 9/24/2019    Procedure: CARDIOVERSION;  Surgeon: Moe Barrera MD;  Location: [facility] EP LAB;  Service: Cardiology;  Laterality: N/A;  AF, DCCV/NADINE, anes, DM, Rm 3073    TYMPANOSTOMY TUBE PLACEMENT  1971- 1979    multiple tube placements       Review of patient's allergies indicates:   Allergen Reactions    Adhesive Blisters     Reaction to area in chest and up only    Codeine Itching       Current Facility-Administered Medications   Medication    0.9%  NaCl infusion (for blood administration)    acetaminophen tablet 650 mg    aspirin EC tablet 81 mg    atorvastatin tablet 20 mg    calcium carbonate 200 mg calcium (500 mg) chewable tablet 500 mg    calcium-vitamin D3 500 mg(1,250mg) -200 unit per tablet 2 tablet    cyclobenzaprine tablet 5 mg    cyclobenzaprine tablet 5 mg    DOBUTamine 500mg in D5W 250mL infusion (premix) (NON-TITRATING)    docusate sodium capsule 50 mg    famotidine tablet 20 mg    ferrous sulfate EC tablet 325 mg    furosemide injection 80 mg    gabapentin capsule 300 mg    heparin (porcine) injection 5,000 Units    magnesium oxide tablet 400 mg    mirtazapine tablet 30 mg    mycophenolate capsule 1,000 mg    nystatin 100,000 unit/mL suspension 500,000 Units     oxyCODONE immediate release tablet Tab 10 mg    polyethylene glycol packet 17 g    predniSONE tablet 15 mg    promethazine tablet 25 mg    senna-docusate 8.6-50 mg per tablet 2 tablet    SITagliptin tablet 50 mg    sodium chloride 0.9% flush 10 mL    sulfamethoxazole-trimethoprim 400-80mg per tablet 1 tablet    tacrolimus capsule 2 mg    theophylline 24 hr capsule 400 mg    [START ON 1/7/2020] valGANciclovir tablet 450 mg    venlafaxine 24 hr capsule 150 mg    zolpidem tablet 5 mg     Family History     Problem Relation (Age of Onset)    Broken bones Maternal Grandmother    Cancer Paternal Grandmother    Dislocations Maternal Grandmother    Heart failure Paternal Grandfather, Maternal Grandfather    Migraines Mother, Maternal Grandmother, Paternal Grandmother, Paternal Grandfather, Maternal Grandfather    Obesity Paternal Grandmother    Osteoarthritis Mother, Maternal Grandmother, Paternal Grandmother, Paternal Grandfather, Maternal Grandfather, Father    Osteoporosis Maternal Grandmother    Scoliosis Maternal Grandmother    Stroke Father        Tobacco Use    Smoking status: Never Smoker    Smokeless tobacco: Never Used   Substance and Sexual Activity    Alcohol use: No    Drug use: No    Sexual activity: Not Currently     Review of Systems   All other systems reviewed and are negative.    Objective:     Vital Signs (Most Recent):  Temp: 98.2 °F (36.8 °C) (01/06/20 0807)  Pulse: 98 (01/06/20 1123)  Resp: 18 (01/06/20 0807)  BP: 106/73 (01/06/20 0807)  SpO2: 96 % (01/06/20 0807) Vital Signs (24h Range):  Temp:  [97.4 °F (36.3 °C)-98.2 °F (36.8 °C)] 98.2 °F (36.8 °C)  Pulse:  [] 98  Resp:  [11-18] 18  SpO2:  [93 %-97 %] 96 %  BP: (106-148)/(57-73) 106/73     Patient Vitals for the past 72 hrs (Last 3 readings):   Weight   01/05/20 0555 105.1 kg (231 lb 11.3 oz)     Body mass index is 35.23 kg/m².      Intake/Output Summary (Last 24 hours) at 1/6/2020 1240  Last data filed at 1/6/2020  1100  Gross per 24 hour   Intake 1272 ml   Output 4150 ml   Net -2878 ml       Physical Exam   Constitutional: She appears well-developed and well-nourished. No distress.   HENT:   Head: Normocephalic.   Eyes: Pupils are equal, round, and reactive to light. EOM are normal.   Neck:   Thick neck, unable to appreciate JVP, prominent EJ   Cardiovascular: Normal rate, regular rhythm and normal heart sounds.   No murmur heard.  Chest with midline surgical wound closed, no erythema or discharge.    Pulmonary/Chest: Effort normal and breath sounds normal. No respiratory distress.   Abdominal: Soft. She exhibits no distension. There is no tenderness.   Musculoskeletal:   Trace LE edema. R groin with clean closed wound, no discharge or erythema, small collection palpated, mildly tender   Skin: Skin is warm.       Significant Labs:  CBC:  Recent Labs   Lab 01/04/20  0744 01/05/20  0430 01/06/20  0430   WBC 6.76 6.45 6.39   RBC 2.70* 2.88* 2.86*   HGB 7.2* 7.7* 7.6*   HCT 23.4* 25.1* 24.7*    360* 338   MCV 87 87 86   MCH 26.7* 26.7* 26.6*   MCHC 30.8* 30.7* 30.8*     BNP:  Recent Labs   Lab 12/31/19  0730 01/02/20  0835   * 594*     CMP:  Recent Labs   Lab 01/04/20  0744 01/05/20  0430 01/05/20 2027 01/06/20  0430   GLU 98 103 162* 121*   CALCIUM 8.5* 8.7 8.6* 8.8   ALBUMIN 2.5* 2.6*  --  2.8*   PROT 5.5* 5.6*  --  5.7*   * 137 137 138   K 3.8 3.9 4.4 3.5   CO2 31* 29 29 30*   CL 93* 95 95 95   BUN 41* 40* 38* 38*   CREATININE 3.3* 3.2* 3.3* 3.2*   ALKPHOS 79 83  --  90   ALT 12 9*  --  9*   AST 9* 8*  --  9*   BILITOT 0.5 0.5  --  0.5      Coagulation:   No results for input(s): PT, INR, APTT in the last 168 hours.  LDH:  No results for input(s): LDH in the last 72 hours.  Microbiology:  Microbiology Results (last 7 days)     ** No results found for the last 168 hours. **          I have reviewed all pertinent labs within the past 24 hours.

## 2020-01-06 NOTE — PROCEDURES
CENTRAL VENOUS LINE INSERTION:    Indications: Central IV access for medication administration and hemodynamic monitoring    Antisepsis: sterile gloves, mask, gown, and drape.  Skin prepped with Chloraprep.  Catheter: Triple lumen central line via RIJ.  Insertion directed by ultrasound.  Heme positive aspiration all ports.  Secured with sutures.  Dressing: dry sterile gauze and bio occlusive dressing.  Complications: None.      **This procedure was performed on 1/3/2020

## 2020-01-06 NOTE — PLAN OF CARE
* AAO x 4  * Cardiac diet patient tolerating well. See chart for % eaten.   * Generalized ecchymosis noted   * Generalized edema noted no pitting edema.  * Bed at lowest position and locked, call light within reach, non-slip socks on, mother at bedside, and side rails up x 2.  Encouraged patient to call for assistance when needed patient and mother stated understanding. This RN visualized patient ambulating in room gait and balance WNL.  * Healing mid-sternal incision site clean dry and intact no signs or symptoms of infection noted.  * Healed right groin hematoma site  * Healed right lower quad old drive line site  * Healing left chest wall old AICD site  * RIJ trialysis (1/2/20) patent, dressing clean dry and intact no signs or symptoms of infection noted. Dressing due to be changed on 1/9/20  * Dobutamine 2.5 mcg/kg/min CW=766.1 kg infusing at 8 cc/hr rate and dose verified by this RN and CAROLINA Ortiz RN  * Oxygen saturation 92-95 % on room air.  Respirations even and unlabored no signs or symptoms of distress noted.  * Patient has no complaints of pain.   * Skin assessment preformed no breakdown noted.  * Standard precautions maintained.  * Continuous telemetry monitoring continued SR 90s.  * Patient able to turn self no assistance needed.  * Patient voiding clear yellow urine.  See flow sheet for intake and output totals.  * Visi continuous monitoring in use, see flow sheet for readings

## 2020-01-06 NOTE — PROCEDURES
Radiology Post-Procedure Note    Pre Op Diagnosis: pleural effusions  Post Op Diagnosis: Same    Procedure: R thoracentesis    Procedure performed by: Talha Salazar MD    Written Informed Consent Obtained: Yes  Specimen Removed:  cc serous fluid  Estimated Blood Loss: Minimal    Findings:   Successful R thoracentesis.    Patient tolerated procedure well.    @SIG@

## 2020-01-06 NOTE — CARE UPDATE
Brief HTS Update Note    Rechecked hemodynamics per signout. UOP 100cc/hr (1300cc UOP since 0700), NN 620cc on IV Lasix 80mg tid. MVO2 65%, CO 11.7, CI 5.3; MAP 95, CVP 18, . Will continue current management for now.    Abhijit Ordaz MD  PGY-4, Cardiology  Pager [phone]

## 2020-01-06 NOTE — PLAN OF CARE
Pt aao x4. Call bell within reach. She is up independent. Plan to continue dobutamine at 2.5mcg and lasix 80mg IV TID. Plan for heart biopsy tomorrow. Will continue to monitor.

## 2020-01-06 NOTE — H&P
Inpatient Radiology Pre-procedure Note    History of Present Illness:  Deborah Navas is a 49 y.o. female who presents for thoracentesis.  Admission H&P reviewed.  Past Medical History:   Diagnosis Date    Fractures     History of ventricular fibrillation 9/4/2019    History of ventricular tachycardia 9/4/2019    Hyperlipidemia     Migraine     Multinodular goiter 9/5/2019    Osteoarthritis      Past Surgical History:   Procedure Laterality Date    BACK SURGERY      2007    BIOPSY WITH ULTRASOUND GUIDANCE N/A 12/31/2019    Procedure: BIOPSY, WITH US GUIDANCE;  Surgeon: Eric Antunez Jr., MD;  Location: [facility] CATH LAB;  Service: Cardiology;  Laterality: N/A;    BONE GRAFT Left     from Left hip to Left FA    eardrum reconstruction  1980    ELBOW SURGERY Left [id number]    FOREARM FRACTURE SURGERY Bilateral 1359-2800    multiple surgeries    HEART TRANSPLANT N/A 12/16/2019    Procedure: TRANSPLANT, HEART;  Surgeon: Talha Nuñez MD;  Location: [facility] OR [address] [place] [address];  Service: Cardiothoracic;  Laterality: N/A;    INSERTION OF GRAFT TO PERICARDIUM  9/10/2019    Procedure: INSERTION, GRAFT, PERICARDIUM;  Surgeon: Shar Walden MD;  Location: [facility] OR [address] [place] [address];  Service: Cardiovascular;;    INSERTION OF IMPLANTABLE CARDIOVERTER-DEFIBRILLATOR (ICD) GENERATOR WITH TWO EXISTING LEADS      INSERTION OF PACEMAKER Left     LEFT VENTRICULAR ASSIST DEVICE N/A 9/10/2019    Procedure: INSERTION-LEFT VENTRICULAR ASSIST DEVICE;  Surgeon: Shar Walden MD;  Location: [facility] OR [address] [place] [address];  Service: Cardiovascular;  Laterality: N/A;  DT HM3     LUMBAR FUSION  2007    L4-L5    RIGHT HEART CATHETERIZATION Right 9/4/2019    Procedure: INSERTION, CATHETER, RIGHT HEART;  Surgeon: Vi Bryant MD;  Location: [facility] CATH LAB;  Service: Cardiology;  Laterality: Right;    RIGHT HEART CATHETERIZATION N/A 11/18/2019    Procedure: INSERTION, CATHETER, RIGHT HEART;  Surgeon: Eric Antunez Jr., MD;  Location:  [facility] CATH LAB;  Service: Cardiology;  Laterality: N/A;    RIGHT HEART CATHETERIZATION Right 12/31/2019    Procedure: INSERTION, CATHETER, RIGHT HEART;  Surgeon: Eric Antunez Jr., MD;  Location: [facility] CATH LAB;  Service: Cardiology;  Laterality: Right;    SINUS SURGERY Right 1994    with lymph nodes    STERNAL WOUND CLOSURE  9/10/2019    Procedure: CLOSURE, WOUND, STERNUM;  Surgeon: Shar Walden MD;  Location: [facility] OR [address] [place] [address];  Service: Cardiovascular;;    TEMPOROMANDIBULAR JOINT SURGERY Right 1988    TONSILLECTOMY      TREATMENT OF CARDIAC ARRHYTHMIA N/A 9/24/2019    Procedure: CARDIOVERSION;  Surgeon: Moe Barrera MD;  Location: [facility] EP LAB;  Service: Cardiology;  Laterality: N/A;  AF, DCCV/NADINE, anes, DM, Rm 3073    TYMPANOSTOMY TUBE PLACEMENT  1971- 1979    multiple tube placements       Review of Systems:   As documented in primary team H&P    Home Meds:   Prior to Admission medications    Medication Sig Start Date End Date Taking? Authorizing Provider   aspirin (ECOTRIN) 81 MG EC tablet Take 1 tablet (81 mg total) by mouth once daily. 12/31/19  Yes Miriam Prieto MD   atorvastatin (LIPITOR) 20 MG tablet Take 1 tablet (20 mg total) by mouth once daily. 12/31/19 12/30/20 Yes Miriam Prieto MD   calcium carbonate-vitamin D3 1,000 mg(2,500 mg)-800 unit Tab Take 1 tablet by mouth once daily. 12/30/19  Yes Miriam Prieto MD   ferrous sulfate (FEOSOL) 325 mg (65 mg iron) Tab tablet Take 1 tablet (325 mg total) by mouth 2 (two) times daily. 12/30/19  Yes Miriam Prieto MD   furosemide (LASIX) 80 MG tablet Take 1 tablet (80 mg total) by mouth 2 (two) times daily. 12/30/19 12/29/20 Yes Miriam Prieto MD   gabapentin (NEURONTIN) 300 MG capsule Take 1 capsule (300 mg total) by mouth every evening. 12/30/19  Yes Miriam Prieto MD   magnesium oxide (MAG-OX) 400 mg (241.3 mg magnesium) tablet Take 1 tablet (400 mg total) by mouth once daily. 12/30/19  Yes Miriam Prieto MD   mirtazapine (REMERON) 15  MG tablet Take 1 tablet (15 mg total) by mouth every evening. 11/19/19 11/18/20 Yes Miriam Prieto MD   mycophenolate (CELLCEPT) 250 mg Cap Take 4 capsules (1,000 mg total) by mouth 2 (two) times daily. 12/20/19 12/19/20 Yes Eric Antunez Jr., MD   nystatin (MYCOSTATIN) 100,000 unit/mL suspension Take 5 mLs (500,000 Units total) by mouth 4 (four) times daily with meals and nightly. 12/20/19  Yes Eric Antunez Jr., MD   oxyCODONE (ROXICODONE) 10 mg Tab immediate release tablet Take 1 tablet (10 mg total) by mouth every 8 (eight) hours as needed. 12/30/19  Yes Julieth Ramos PA-C   predniSONE (DELTASONE) 5 MG tablet Take 4 tablets (20 mg total) by mouth once daily. 12/30/19  Yes Miriam Prieto MD   promethazine (PHENERGAN) 25 MG tablet Take 1 tablet (25 mg total) by mouth every 6 (six) hours as needed for Nausea. 12/31/19  Yes Eric Antunez Jr., MD   ranitidine (ZANTAC) 150 MG tablet Take 1 tablet (150 mg total) by mouth nightly. 12/30/19 12/29/20 Yes Miriam Prieto MD   senna-docusate 8.6-50 mg (PERICOLACE) 8.6-50 mg per tablet Take 2 tablets by mouth 2 (two) times daily as needed for Constipation. 10/1/19  Yes Vi Bryant MD   SITagliptin (JANUVIA) 50 MG Tab Take 1 tablet (50 mg total) by mouth once daily. 12/31/19 4/29/20 Yes Miriam Prieto MD   sulfamethoxazole-trimethoprim 400-80mg (BACTRIM,SEPTRA) 400-80 mg per tablet Take 1 tablet by mouth once daily. 12/20/19  Yes Eric Antunez Jr., MD   tacrolimus (PROGRAF) 1 MG Cap Take 3 capsules (3 mg total) by mouth every morning AND 4 capsules (4 mg total) every evening. 12/30/19 12/29/20 Yes Miriam Prieto MD   theophylline (CRYSTAL-24) 400 mg 24 hr capsule Take 400 mg by mouth once daily. 12/30/19  Yes Miriam Prieto MD   valGANciclovir (VALCYTE) 450 mg Tab Take 1 tablet (450 mg total) by mouth once daily. 12/30/19  Yes Miriam Prieto MD   venlafaxine (EFFEXOR-XR) 150 MG Cp24 Take 1 capsule (150 mg total) by mouth once daily.  12/31/19 12/30/20 Yes Miriam Prieto MD   zolpidem (AMBIEN) 5 MG Tab Take 1 tablet (5 mg total) by mouth nightly as needed for insomnia. 12/30/19  Yes Miriam Prieto MD   blood sugar diagnostic Strp Use to test blood glucose 2 (two) times daily. 12/30/19   Miriam Prieto MD   blood-glucose meter Misc USE AS DIRECTED 12/30/19   Miriam Prieto MD   lancets Misc Use to test blood glucose 2 (two) times daily. 12/30/19   Miriam Prieto MD   opw transplant care kit Welcome to [facility].  This is your transplant care kit. 12/30/19   Monet Carvajal, PharmD     Scheduled Meds:    aspirin  81 mg Oral Daily    atorvastatin  20 mg Oral Daily    calcium-vitamin D3  2 tablet Oral Daily    docusate sodium  50 mg Oral TID    famotidine  20 mg Oral Daily    ferrous sulfate  325 mg Oral BID    furosemide  80 mg Intravenous TID    gabapentin  300 mg Oral QHS    heparin (porcine)  5,000 Units Subcutaneous Q12H    magnesium oxide  400 mg Oral Daily    mirtazapine  30 mg Oral QHS    mycophenolate  1,000 mg Oral BID    nystatin  500,000 Units Oral QID (WM & HS)    polyethylene glycol  17 g Oral Daily    predniSONE  15 mg Oral Daily    SITagliptin  50 mg Oral Daily    sulfamethoxazole-trimethoprim 400-80mg  1 tablet Oral Every Mon, Wed, Fri    tacrolimus  2 mg Oral BID    theophylline  400 mg Oral Daily    valGANciclovir  450 mg Oral Daily    venlafaxine  150 mg Oral Daily     Continuous Infusions:    DOBUTamine 2.5 mcg/kg/min (01/05/20 2226)     PRN Meds:sodium chloride, acetaminophen, calcium carbonate, oxyCODONE, promethazine, senna-docusate 8.6-50 mg, sodium chloride 0.9%, zolpidem  Anticoagulants/Antiplatelets: no anticoagulation    Allergies:   Review of patient's allergies indicates:   Allergen Reactions    Adhesive Blisters     Reaction to area in chest and up only    Codeine Itching     Sedation Hx: have not been any systemic reactions    Labs:  No results for input(s): INR in  the last 168 hours.    Invalid input(s):  PT,  PTT    Recent Labs   Lab 01/06/20 0430   WBC 6.39   HGB 7.6*   HCT 24.7*   MCV 86         Recent Labs   Lab 01/06/20 0430   *      K 3.5   CL 95   CO2 30*   BUN 38*   CREATININE 3.2*   CALCIUM 8.8   MG 2.6   ALT 9*   AST 9*   ALBUMIN 2.8*   BILITOT 0.5         Vitals:  Temp: 97.9 °F (36.6 °C) (01/05/20 1945)  Pulse: 101 (01/06/20 0400)  Resp: 18 (01/06/20 0400)  BP: (!) 121/57 (01/06/20 0715)  SpO2: 97 % (01/06/20 0400)     Physical Exam:  ASA: 3  Mallampati: 2    General: no acute distress  Mental Status: alert and oriented to person, place and time  HEENT: normocephalic, atraumatic  Chest: unlabored breathing  Heart: regular heart rate  Abdomen: nondistended  Extremity: moves all extremities    Plan: thoracentesis   Sedation Plan: local only    Johanna Hughes MD  PGY-2  Pager: [phone]

## 2020-01-07 DIAGNOSIS — Z94.1 S/P ORTHOTOPIC HEART TRANSPLANT: ICD-10-CM

## 2020-01-07 LAB
ALBUMIN SERPL BCP-MCNC: 2.6 G/DL (ref 3.5–5.2)
ALP SERPL-CCNC: 87 U/L (ref 55–135)
ALT SERPL W/O P-5'-P-CCNC: 10 U/L (ref 10–44)
ANION GAP SERPL CALC-SCNC: 12 MMOL/L (ref 8–16)
ASCENDING AORTA: 2.14 CM
AST SERPL-CCNC: 8 U/L (ref 10–40)
AV INDEX (PROSTH): 0.81
AV MEAN GRADIENT: 10 MMHG
AV PEAK GRADIENT: 16 MMHG
AV VALVE AREA: 2.5 CM2
AV VELOCITY RATIO: 0.73
BASOPHILS # BLD AUTO: 0.02 K/UL (ref 0–0.2)
BASOPHILS NFR BLD: 0.3 % (ref 0–1.9)
BILIRUB SERPL-MCNC: 0.4 MG/DL (ref 0.1–1)
BSA FOR ECHO PROCEDURE: 2.23 M2
BUN SERPL-MCNC: 33 MG/DL (ref 6–20)
CALCIUM SERPL-MCNC: 8.9 MG/DL (ref 8.7–10.5)
CHLORIDE SERPL-SCNC: 97 MMOL/L (ref 95–110)
CO2 SERPL-SCNC: 31 MMOL/L (ref 23–29)
CREAT SERPL-MCNC: 2.7 MG/DL (ref 0.5–1.4)
CV ECHO LV RWT: 0.39 CM
DIFFERENTIAL METHOD: ABNORMAL
DOP CALC AO PEAK VEL: 2.01 M/S
DOP CALC AO VTI: 30.53 CM
DOP CALC LVOT AREA: 3.1 CM2
DOP CALC LVOT DIAMETER: 1.98 CM
DOP CALC LVOT PEAK VEL: 1.47 M/S
DOP CALC LVOT STROKE VOLUME: 76.32 CM3
DOP CALCLVOT PEAK VEL VTI: 24.8 CM
ECHO LV POSTERIOR WALL: 0.85 CM (ref 0.6–1.1)
EOSINOPHIL # BLD AUTO: 0.2 K/UL (ref 0–0.5)
EOSINOPHIL NFR BLD: 2.8 % (ref 0–8)
ERYTHROCYTE [DISTWIDTH] IN BLOOD BY AUTOMATED COUNT: 16.6 % (ref 11.5–14.5)
EST. GFR  (AFRICAN AMERICAN): 22.8 ML/MIN/1.73 M^2
EST. GFR  (NON AFRICAN AMERICAN): 19.8 ML/MIN/1.73 M^2
FRACTIONAL SHORTENING: 45 % (ref 28–44)
GLUCOSE SERPL-MCNC: 107 MG/DL (ref 70–110)
HCT VFR BLD AUTO: 25.9 % (ref 37–48.5)
HGB BLD-MCNC: 7.8 G/DL (ref 12–16)
IMM GRANULOCYTES # BLD AUTO: 0.04 K/UL (ref 0–0.04)
IMM GRANULOCYTES NFR BLD AUTO: 0.6 % (ref 0–0.5)
INTERVENTRICULAR SEPTUM: 0.85 CM (ref 0.6–1.1)
LEFT INTERNAL DIMENSION IN SYSTOLE: 2.37 CM (ref 2.1–4)
LEFT VENTRICLE DIASTOLIC VOLUME INDEX: 38.93 ML/M2
LEFT VENTRICLE DIASTOLIC VOLUME: 83.84 ML
LEFT VENTRICLE MASS INDEX: 53 G/M2
LEFT VENTRICLE SYSTOLIC VOLUME INDEX: 9.1 ML/M2
LEFT VENTRICLE SYSTOLIC VOLUME: 19.61 ML
LEFT VENTRICULAR INTERNAL DIMENSION IN DIASTOLE: 4.32 CM (ref 3.5–6)
LEFT VENTRICULAR MASS: 115.04 G
LYMPHOCYTES # BLD AUTO: 0.2 K/UL (ref 1–4.8)
LYMPHOCYTES NFR BLD: 2.6 % (ref 18–48)
MAGNESIUM SERPL-MCNC: 2.3 MG/DL (ref 1.6–2.6)
MCH RBC QN AUTO: 26.3 PG (ref 27–31)
MCHC RBC AUTO-ENTMCNC: 30.1 G/DL (ref 32–36)
MCV RBC AUTO: 87 FL (ref 82–98)
MONOCYTES # BLD AUTO: 0.4 K/UL (ref 0.3–1)
MONOCYTES NFR BLD: 6 % (ref 4–15)
MV PEAK E VEL: 1.03 M/S
NEUTROPHILS # BLD AUTO: 5.4 K/UL (ref 1.8–7.7)
NEUTROPHILS NFR BLD: 87.7 % (ref 38–73)
NRBC BLD-RTO: 0 /100 WBC
PISA TR MAX VEL: 3.02 M/S
PLATELET # BLD AUTO: 348 K/UL (ref 150–350)
PMV BLD AUTO: 9 FL (ref 9.2–12.9)
POTASSIUM SERPL-SCNC: 3.1 MMOL/L (ref 3.5–5.1)
PROT SERPL-MCNC: 5.7 G/DL (ref 6–8.4)
RA PRESSURE: 3 MMHG
RBC # BLD AUTO: 2.97 M/UL (ref 4–5.4)
RIGHT VENTRICULAR END-DIASTOLIC DIMENSION: 3.63 CM
SINUS: 2.62 CM
SODIUM SERPL-SCNC: 140 MMOL/L (ref 136–145)
STJ: 2.03 CM
TACROLIMUS BLD-MCNC: 7.3 NG/ML (ref 5–15)
THEOPHYLLINE SERPL-MCNC: 26.1 UG/ML (ref 8–20)
TR MAX PG: 36 MMHG
TRICUSPID ANNULAR PLANE SYSTOLIC EXCURSION: 1.72 CM
TV REST PULMONARY ARTERY PRESSURE: 39 MMHG
WBC # BLD AUTO: 6.17 K/UL (ref 3.9–12.7)

## 2020-01-07 PROCEDURE — 25000003 PHARM REV CODE 250: Performed by: STUDENT IN AN ORGANIZED HEALTH CARE EDUCATION/TRAINING PROGRAM

## 2020-01-07 PROCEDURE — 97116 GAIT TRAINING THERAPY: CPT

## 2020-01-07 PROCEDURE — 93010 ELECTROCARDIOGRAM REPORT: CPT | Mod: ,,, | Performed by: INTERNAL MEDICINE

## 2020-01-07 PROCEDURE — 20600001 HC STEP DOWN PRIVATE ROOM

## 2020-01-07 PROCEDURE — 93005 ELECTROCARDIOGRAM TRACING: CPT

## 2020-01-07 PROCEDURE — 63600175 PHARM REV CODE 636 W HCPCS: Performed by: INTERNAL MEDICINE

## 2020-01-07 PROCEDURE — 99233 PR SUBSEQUENT HOSPITAL CARE,LEVL III: ICD-10-PCS | Mod: ,,, | Performed by: INTERNAL MEDICINE

## 2020-01-07 PROCEDURE — 93010 EKG 12-LEAD: ICD-10-PCS | Mod: ,,, | Performed by: INTERNAL MEDICINE

## 2020-01-07 PROCEDURE — 25000003 PHARM REV CODE 250: Performed by: INTERNAL MEDICINE

## 2020-01-07 PROCEDURE — 80198 ASSAY OF THEOPHYLLINE: CPT

## 2020-01-07 PROCEDURE — 93505 ENDOMYOCARDIAL BIOPSY: CPT | Mod: 26,,, | Performed by: INTERNAL MEDICINE

## 2020-01-07 PROCEDURE — 93505 PR BIOPSY OF HEART LINING: ICD-10-PCS | Mod: 26,,, | Performed by: INTERNAL MEDICINE

## 2020-01-07 PROCEDURE — 63600175 PHARM REV CODE 636 W HCPCS: Performed by: STUDENT IN AN ORGANIZED HEALTH CARE EDUCATION/TRAINING PROGRAM

## 2020-01-07 PROCEDURE — 83735 ASSAY OF MAGNESIUM: CPT

## 2020-01-07 PROCEDURE — C1894 INTRO/SHEATH, NON-LASER: HCPCS | Performed by: INTERNAL MEDICINE

## 2020-01-07 PROCEDURE — 93451 RIGHT HEART CATH: CPT | Mod: 26,59,, | Performed by: INTERNAL MEDICINE

## 2020-01-07 PROCEDURE — 80053 COMPREHEN METABOLIC PANEL: CPT

## 2020-01-07 PROCEDURE — 97161 PT EVAL LOW COMPLEX 20 MIN: CPT

## 2020-01-07 PROCEDURE — C1751 CATH, INF, PER/CENT/MIDLINE: HCPCS | Performed by: INTERNAL MEDICINE

## 2020-01-07 PROCEDURE — 80197 ASSAY OF TACROLIMUS: CPT

## 2020-01-07 PROCEDURE — 88342 IMHCHEM/IMCYTCHM 1ST ANTB: CPT | Mod: 59 | Performed by: PATHOLOGY

## 2020-01-07 PROCEDURE — 88307 TISSUE EXAM BY PATHOLOGIST: CPT | Mod: 26,,, | Performed by: PATHOLOGY

## 2020-01-07 PROCEDURE — 99233 SBSQ HOSP IP/OBS HIGH 50: CPT | Mod: ,,, | Performed by: INTERNAL MEDICINE

## 2020-01-07 PROCEDURE — 85025 COMPLETE CBC W/AUTO DIFF WBC: CPT

## 2020-01-07 PROCEDURE — 88307 PR  SURG PATH,LEVEL V: ICD-10-PCS | Mod: 26,,, | Performed by: PATHOLOGY

## 2020-01-07 PROCEDURE — 93505 ENDOMYOCARDIAL BIOPSY: CPT | Performed by: INTERNAL MEDICINE

## 2020-01-07 PROCEDURE — 97165 OT EVAL LOW COMPLEX 30 MIN: CPT

## 2020-01-07 PROCEDURE — 93451 PR RIGHT HEART CATH O2 SATURATION & CARDIAC OUTPUT: ICD-10-PCS | Mod: 26,59,, | Performed by: INTERNAL MEDICINE

## 2020-01-07 PROCEDURE — 27201423 OPTIME MED/SURG SUP & DEVICES STERILE SUPPLY: Performed by: INTERNAL MEDICINE

## 2020-01-07 PROCEDURE — 36415 COLL VENOUS BLD VENIPUNCTURE: CPT

## 2020-01-07 PROCEDURE — 88342 IMHCHEM/IMCYTCHM 1ST ANTB: CPT | Mod: 26,,, | Performed by: PATHOLOGY

## 2020-01-07 PROCEDURE — 93451 RIGHT HEART CATH: CPT | Performed by: INTERNAL MEDICINE

## 2020-01-07 PROCEDURE — 88342 CHG IMMUNOCYTOCHEMISTRY: ICD-10-PCS | Mod: 26,,, | Performed by: PATHOLOGY

## 2020-01-07 PROCEDURE — 88307 TISSUE EXAM BY PATHOLOGIST: CPT | Performed by: PATHOLOGY

## 2020-01-07 PROCEDURE — 97530 THERAPEUTIC ACTIVITIES: CPT

## 2020-01-07 RX ORDER — LIDOCAINE HYDROCHLORIDE 20 MG/ML
INJECTION, SOLUTION INFILTRATION; PERINEURAL
Status: DISCONTINUED | OUTPATIENT
Start: 2020-01-07 | End: 2020-01-07 | Stop reason: HOSPADM

## 2020-01-07 RX ORDER — ALPRAZOLAM 0.25 MG/1
0.25 TABLET ORAL ONCE
Status: COMPLETED | OUTPATIENT
Start: 2020-01-07 | End: 2020-01-07

## 2020-01-07 RX ORDER — SODIUM CHLORIDE 0.9 G/100ML
IRRIGANT IRRIGATION
Status: DISCONTINUED | OUTPATIENT
Start: 2020-01-07 | End: 2020-01-07 | Stop reason: HOSPADM

## 2020-01-07 RX ORDER — AMLODIPINE BESYLATE 10 MG/1
10 TABLET ORAL DAILY
Status: DISCONTINUED | OUTPATIENT
Start: 2020-01-07 | End: 2020-01-10 | Stop reason: HOSPADM

## 2020-01-07 RX ADMIN — SITAGLIPTIN 50 MG: 50 TABLET, FILM COATED ORAL at 10:01

## 2020-01-07 RX ADMIN — CYCLOBENZAPRINE HYDROCHLORIDE 5 MG: 5 TABLET, FILM COATED ORAL at 02:01

## 2020-01-07 RX ADMIN — ASPIRIN 81 MG: 81 TABLET, COATED ORAL at 10:01

## 2020-01-07 RX ADMIN — HEPARIN SODIUM 5000 UNITS: 5000 INJECTION, SOLUTION INTRAVENOUS; SUBCUTANEOUS at 08:01

## 2020-01-07 RX ADMIN — FERROUS SULFATE TAB EC 325 MG (65 MG FE EQUIVALENT) 325 MG: 325 (65 FE) TABLET DELAYED RESPONSE at 08:01

## 2020-01-07 RX ADMIN — FERROUS SULFATE TAB EC 325 MG (65 MG FE EQUIVALENT) 325 MG: 325 (65 FE) TABLET DELAYED RESPONSE at 10:01

## 2020-01-07 RX ADMIN — TACROLIMUS 2 MG: 1 CAPSULE ORAL at 05:01

## 2020-01-07 RX ADMIN — POTASSIUM BICARBONATE 50 MEQ: 978 TABLET, EFFERVESCENT ORAL at 10:01

## 2020-01-07 RX ADMIN — MYCOPHENOLATE MOFETIL 1000 MG: 250 CAPSULE ORAL at 10:01

## 2020-01-07 RX ADMIN — NYSTATIN 500000 UNITS: 100000 SUSPENSION ORAL at 05:01

## 2020-01-07 RX ADMIN — ZOLPIDEM TARTRATE 5 MG: 5 TABLET ORAL at 09:01

## 2020-01-07 RX ADMIN — FAMOTIDINE 20 MG: 20 TABLET ORAL at 10:01

## 2020-01-07 RX ADMIN — OXYCODONE HYDROCHLORIDE 10 MG: 10 TABLET ORAL at 10:01

## 2020-01-07 RX ADMIN — AMLODIPINE BESYLATE 10 MG: 10 TABLET ORAL at 10:01

## 2020-01-07 RX ADMIN — ALPRAZOLAM 0.25 MG: 0.25 TABLET ORAL at 12:01

## 2020-01-07 RX ADMIN — MYCOPHENOLATE MOFETIL 1000 MG: 250 CAPSULE ORAL at 08:01

## 2020-01-07 RX ADMIN — GABAPENTIN 300 MG: 300 CAPSULE ORAL at 08:01

## 2020-01-07 RX ADMIN — DOCUSATE SODIUM 50 MG: 50 CAPSULE, LIQUID FILLED ORAL at 08:01

## 2020-01-07 RX ADMIN — Medication 400 MG: at 10:01

## 2020-01-07 RX ADMIN — NYSTATIN 500000 UNITS: 100000 SUSPENSION ORAL at 12:01

## 2020-01-07 RX ADMIN — MIRTAZAPINE 30 MG: 15 TABLET, FILM COATED ORAL at 08:01

## 2020-01-07 RX ADMIN — DOCUSATE SODIUM 50 MG: 50 CAPSULE, LIQUID FILLED ORAL at 02:01

## 2020-01-07 RX ADMIN — POTASSIUM BICARBONATE 25 MEQ: 978 TABLET, EFFERVESCENT ORAL at 12:01

## 2020-01-07 RX ADMIN — VALGANCICLOVIR 450 MG: 450 TABLET, FILM COATED ORAL at 10:01

## 2020-01-07 RX ADMIN — CYCLOBENZAPRINE HYDROCHLORIDE 5 MG: 5 TABLET, FILM COATED ORAL at 08:01

## 2020-01-07 RX ADMIN — MENTHOL, METHYL SALICYLATE: 10; 15 CREAM TOPICAL at 02:01

## 2020-01-07 RX ADMIN — FUROSEMIDE 80 MG: 10 INJECTION, SOLUTION INTRAMUSCULAR; INTRAVENOUS at 02:01

## 2020-01-07 RX ADMIN — OYSTER SHELL CALCIUM WITH VITAMIN D 2 TABLET: 500; 200 TABLET, FILM COATED ORAL at 10:01

## 2020-01-07 RX ADMIN — ATORVASTATIN CALCIUM 20 MG: 20 TABLET, FILM COATED ORAL at 10:01

## 2020-01-07 RX ADMIN — PREDNISONE 15 MG: 10 TABLET ORAL at 10:01

## 2020-01-07 RX ADMIN — VENLAFAXINE HYDROCHLORIDE 150 MG: 37.5 CAPSULE, EXTENDED RELEASE ORAL at 10:01

## 2020-01-07 RX ADMIN — MENTHOL, METHYL SALICYLATE: 10; 15 CREAM TOPICAL at 08:01

## 2020-01-07 RX ADMIN — TACROLIMUS 2 MG: 1 CAPSULE ORAL at 08:01

## 2020-01-07 RX ADMIN — POLYETHYLENE GLYCOL 3350 17 G: 17 POWDER, FOR SOLUTION ORAL at 10:01

## 2020-01-07 RX ADMIN — DOCUSATE SODIUM 50 MG: 50 CAPSULE, LIQUID FILLED ORAL at 10:01

## 2020-01-07 RX ADMIN — NYSTATIN 500000 UNITS: 100000 SUSPENSION ORAL at 08:01

## 2020-01-07 NOTE — PROGRESS NOTES
Update:    SW met with pt alone in pt's room in order to provide continuity of care and support. Pt AAOx4, pleasant, but reports that she is frustrated with having to be in the hospital. Pt expressed understanding for the need to be hospitalized but wishing she was further along. SW provided validation and support. Pt reports that her uncle passed away yesterday and she has been providing telephone support to family. Pt reports she is coping despite his passing. Pt reports that she is getting good support from mother and caregiver Flavia while in the hospital. Pt looking forward to returning to [facility] by the end of the week and denying any concerns at this time. SW remains available for continued psychosocial support, education, resources, and additional d/c planning as needed.

## 2020-01-07 NOTE — PLAN OF CARE
Problem: Physical Therapy Goal  Goal: Physical Therapy Goal  Description  Goals to be met by: 2020     Patient will increase functional independence with mobility by performin. Supine to sit with Set-up [place]  2. Sit to supine with Set-up [place]  3. Sit to stand transfer with Supervision  4. Bed to chair transfer with Supervision  5. Gait  x 150 feet with Supervision without AD.   6. Lower extremity exercise program x15 reps per handout, with supervision     Outcome: Ongoing, Progressing     Goals set

## 2020-01-07 NOTE — PROGRESS NOTES
[facility]  Heart Transplant  Progress Note    Patient Name: Deborah Navas  MRN: [id number]  Admission Date: 1/2/2020  Hospital Length of Stay: 5 days  Attending Physician: Dr. Lua  Primary Care Provider: Valeria Verma MD  Principal Problem:GLO (acute kidney injury)    Subjective:     Interval events:  Patient was seen and examined this morning at bedside. Dobutamine was discontinued overnight. This morning she underwent RHC and EMB, with upper normal R and L filling pressures, no complications. Her theophylline level was significantly elevated so it was discontinued. She had some improvement of the shoulder pain, currently mild. Still c/o significant tremors.    Past Medical History:   Diagnosis Date    Fractures     History of ventricular fibrillation 9/4/2019    History of ventricular tachycardia 9/4/2019    Hyperlipidemia     Migraine     Osteoarthritis        Past Surgical History:   Procedure Laterality Date    BACK SURGERY      2007    BIOPSY WITH ULTRASOUND GUIDANCE N/A 12/31/2019    Procedure: BIOPSY, WITH US GUIDANCE;  Surgeon: Eric Antunez Jr., MD;  Location: [facility] CATH LAB;  Service: Cardiology;  Laterality: N/A;    BONE GRAFT Left     from Left hip to Left FA    eardrum reconstruction  1980    ELBOW SURGERY Left [id number]    FOREARM FRACTURE SURGERY Bilateral 7918-3506    multiple surgeries    HEART TRANSPLANT N/A 12/16/2019    Procedure: TRANSPLANT, HEART;  Surgeon: Talha Nuñez MD;  Location: [facility] OR [address] [place] [address];  Service: Cardiothoracic;  Laterality: N/A;    INSERTION OF GRAFT TO PERICARDIUM  9/10/2019    Procedure: INSERTION, GRAFT, PERICARDIUM;  Surgeon: Shar Walden MD;  Location: [facility] OR [address] [place] [address];  Service: Cardiovascular;;    INSERTION OF IMPLANTABLE CARDIOVERTER-DEFIBRILLATOR (ICD) GENERATOR WITH TWO EXISTING LEADS      INSERTION OF PACEMAKER Left     LEFT VENTRICULAR ASSIST DEVICE N/A 9/10/2019    Procedure: INSERTION-LEFT VENTRICULAR  ASSIST DEVICE;  Surgeon: Shar Walden MD;  Location: [facility] OR [facility] FLR;  Service: Cardiovascular;  Laterality: N/A;  DT HM3     LUMBAR FUSION  2007    L4-L5    RIGHT HEART CATHETERIZATION Right 9/4/2019    Procedure: INSERTION, CATHETER, RIGHT HEART;  Surgeon: Vi Bryant MD;  Location: [facility] CATH LAB;  Service: Cardiology;  Laterality: Right;    RIGHT HEART CATHETERIZATION N/A 11/18/2019    Procedure: INSERTION, CATHETER, RIGHT HEART;  Surgeon: Eric Antunez Jr., MD;  Location: [facility] CATH LAB;  Service: Cardiology;  Laterality: N/A;    RIGHT HEART CATHETERIZATION Right 12/31/2019    Procedure: INSERTION, CATHETER, RIGHT HEART;  Surgeon: Eric Antunez Jr., MD;  Location: [facility] CATH LAB;  Service: Cardiology;  Laterality: Right;    SINUS SURGERY Right 1994    with lymph nodes    STERNAL WOUND CLOSURE  9/10/2019    Procedure: CLOSURE, WOUND, STERNUM;  Surgeon: Shar Walden MD;  [address];  Service: Cardiovascular;;    TEMPOROMANDIBULAR JOINT SURGERY Right 1988    TONSILLECTOMY      TREATMENT OF CARDIAC ARRHYTHMIA N/A 9/24/2019    Procedure: CARDIOVERSION;  Surgeon: Moe Barrera MD;  Location: [facility] EP LAB;  Service: Cardiology;  Laterality: N/A;  AF, DCCV/NADINE, anes, DM, Rm 3073    TYMPANOSTOMY TUBE PLACEMENT  1971- 1979    multiple tube placements       Review of patient's allergies indicates:   Allergen Reactions    Adhesive Blisters     Reaction to area in chest and up only    Codeine Itching       Current Facility-Administered Medications   Medication    acetaminophen tablet 650 mg    ALPRAZolam tablet 0.25 mg    amLODIPine tablet 10 mg    aspirin EC tablet 81 mg    atorvastatin tablet 20 mg    calcium carbonate 200 mg calcium (500 mg) chewable tablet 500 mg    calcium-vitamin D3 500 mg(1,250mg) -200 unit per tablet 2 tablet    cyclobenzaprine tablet 5 mg    docusate sodium capsule 50 mg    famotidine tablet 20 mg    ferrous sulfate EC tablet 325 mg     furosemide injection 80 mg    gabapentin capsule 300 mg    heparin (porcine) injection 5,000 Units    magnesium oxide tablet 400 mg    methyl salicylate-menthol 15-10% cream    mirtazapine tablet 30 mg    mycophenolate capsule 1,000 mg    nystatin 100,000 unit/mL suspension 500,000 Units    oxyCODONE immediate release tablet Tab 10 mg    polyethylene glycol packet 17 g    potassium bicarbonate disintegrating tablet 25 mEq    predniSONE tablet 15 mg    promethazine tablet 25 mg    senna-docusate 8.6-50 mg per tablet 2 tablet    SITagliptin tablet 50 mg    sodium chloride 0.9% flush 10 mL    sulfamethoxazole-trimethoprim 400-80mg per tablet 1 tablet    tacrolimus capsule 2 mg    valGANciclovir tablet 450 mg    venlafaxine 24 hr capsule 150 mg    zolpidem tablet 5 mg     Family History     Problem Relation (Age of Onset)    Broken bones Maternal Grandmother    Cancer Paternal Grandmother    Dislocations Maternal Grandmother    Heart failure Paternal Grandfather, Maternal Grandfather    Migraines Mother, Maternal Grandmother, Paternal Grandmother, Paternal Grandfather, Maternal Grandfather    Obesity Paternal Grandmother    Osteoarthritis Mother, Maternal Grandmother, Paternal Grandmother, Paternal Grandfather, Maternal Grandfather, Father    Osteoporosis Maternal Grandmother    Scoliosis Maternal Grandmother    Stroke Father        Tobacco Use    Smoking status: Never Smoker    Smokeless tobacco: Never Used   Substance and Sexual Activity    Alcohol use: No    Drug use: No    Sexual activity: Not Currently     Review of Systems   All other systems reviewed and are negative.    Objective:     Vital Signs (Most Recent):  Temp: 97.5 °F (36.4 °C) (01/07/20 1106)  Pulse: 102 (01/07/20 1107)  Resp: 15 (01/07/20 1106)  BP: 100/62 (01/07/20 1106)  SpO2: 96 % (01/07/20 1106) Vital Signs (24h Range):  Temp:  [97.4 °F (36.3 °C)-97.9 °F (36.6 °C)] 97.5 °F (36.4 °C)  Pulse:  [] 102  Resp:  [14-22]  15  SpO2:  [92 %-97 %] 96 %  BP: ()/(60-79) 100/62     Patient Vitals for the past 72 hrs (Last 3 readings):   Weight   01/07/20 0745 105.2 kg (232 lb)   01/05/20 0555 105.1 kg (231 lb 11.3 oz)     Body mass index is 36.34 kg/m².      Intake/Output Summary (Last 24 hours) at 1/7/2020 1212  Last data filed at 1/7/2020 0755  Gross per 24 hour   Intake 948.07 ml   Output 4400 ml   Net -3451.93 ml       Physical Exam   Constitutional: She appears well-developed and well-nourished. No distress.   HENT:   Head: Normocephalic.   Eyes: Pupils are equal, round, and reactive to light. EOM are normal.   Neck:   Thick neck, unable to appreciate JVP, prominent EJ   Cardiovascular: Normal rate, regular rhythm and normal heart sounds.   No murmur heard.  Chest with midline surgical wound closed, no erythema or discharge.    Pulmonary/Chest: Effort normal and breath sounds normal. No respiratory distress.   Abdominal: Soft. She exhibits no distension. There is no tenderness.   Musculoskeletal:   Trace LE edema. R groin with clean closed wound, no discharge or erythema, small collection palpated, mildly tender   Skin: Skin is warm.       Significant Labs:  CBC:  Recent Labs   Lab 01/05/20 0430 01/06/20 0430 01/07/20  0530   WBC 6.45 6.39 6.17   RBC 2.88* 2.86* 2.97*   HGB 7.7* 7.6* 7.8*   HCT 25.1* 24.7* 25.9*   * 338 348   MCV 87 86 87   MCH 26.7* 26.6* 26.3*   MCHC 30.7* 30.8* 30.1*     BNP:  Recent Labs   Lab 01/02/20  0835   *     CMP:  Recent Labs   Lab 01/05/20 0430 01/05/20 2027 01/06/20 0430 01/07/20  0530    162* 121* 107   CALCIUM 8.7 8.6* 8.8 8.9   ALBUMIN 2.6*  --  2.8* 2.6*   PROT 5.6*  --  5.7* 5.7*    137 138 140   K 3.9 4.4 3.5 3.1*   CO2 29 29 30* 31*   CL 95 95 95 97   BUN 40* 38* 38* 33*   CREATININE 3.2* 3.3* 3.2* 2.7*   ALKPHOS 83  --  90 87   ALT 9*  --  9* 10   AST 8*  --  9* 8*   BILITOT 0.5  --  0.5 0.4      Coagulation:   No results for input(s): PT, INR, APTT in the  last 168 hours.  LDH:  No results for input(s): LDH in the last 72 hours.  Microbiology:  Microbiology Results (last 7 days)     ** No results found for the last 168 hours. **          I have reviewed all pertinent labs within the past 24 hours.        Assessment and Plan:     50yo F s/p OHTx on 12/16/2019 for NICM/ HFrEF with HM3 on 9/2019, removed at time of transplant, as well as AFl post LVAD s/p NADINE/DCCV, VT/VF (some a/w hypokalemia), HLD, obesity, and OA, who presented for worsening LEGER and GLO. RHC on 12/31 with elevated filling pressures (RA 17, PCWP 15). Pathology of native heart revealed non-caseating granulomas typical of sarcoidosis. Presentation  was likely c/w cardiorenal syndrome in the setting of ADHF/ fluid overload. Started on IV diuretic with good response at this time. Initial EMB on 12/31 without evidence of rejection.          Acute on chronic diastolic heart failure- improving     S/p OHTx on 12/16 for NICM, path revealed sarcoidosis. Initially hypervolemic, NYHA III  - CVP today 14  - SVR 1453--> increase amlodipine to 10mg daily  - Continue furosemide 80mg IV TID, will plan switching to PO tomorrow  - Monitor UOP, daily weight  - Monitor electrolytes and replete for target K>4 and Mg>2  - TTE revealed LVEF 65% and normal RV function, mild MR, mod-sev TR, IVC not seen; no pericardial effusion appreciated  - RHC today with upper normal R and L filling pressures (RA 12, wedge 15) and elevated CO/CI (8.3/3.8)     Status post heart transplant  - Tacrolimus level slightly elevated on admission, today 7.3 (goal ~8)--> continue 2mg PO BID  - Theophylline levels elevated--> discontinued  - Continue mycophenolate 1000mg BID  - Continue prednisone 15mg daily  - Continue TMP/SMX and valganciclovir  - EMB:   12/31: negative for Ab or cell-mediated rejection   1/7: In progress  - Path of native heart revealed sarcoidosis, will need close f/u and possible chest/ extracardiac imaging     * GLO (acute kidney  injury)- improving  Likely cardiorenal syndrome with renal venous congestion  - IV diuresis as above  - Monitor renal function and electrolytes  - Renally dose meds  - Avoid nephrotoxins  - UA WNL     * Normocytic anemia  - Iron panel c/w LYN  - Continue ferrous sulfate  - Monitor Hb      * R groin fluid collection- seroma vs hematoma      - Repeat R groin US on 1/2 shows decreased size of fluid collection; will repeat today      - CTS aware      - Continue monitoring     * Chronic pain      - Continue gabapentin      - Continue Tylenol and oxycodone PRN     *Shoulder pain- unclear etiology, likely MSK      -Continue analgesic PRN      - Trial of Flexeril and topical NSAID      - Will consider Ortho eval if no improvement     * Miscellaneous      - Diet: Cardiac      - DVT ppx: Heparin SQ      - Code status: Full code         Triny Yusuf MD  Heart Transplant  [facility]

## 2020-01-07 NOTE — CARE UPDATE
HTS Cross Cover    F/u hemodynamics at 9 pm after stopping dobutamine at 630 pm.      6:10 pm 9 pm   CVP 13 14   MAP 84 96   SvO2 53 49   CO 6.3 5.8   CI 2.8 2.6    1134     - No changes at this time. Repeat hemodynamics in AM.     Alphonse Valdes MD  Cardiology Fellow, PGY4

## 2020-01-07 NOTE — NURSING
Rounds Report: Attended interdisciplinary rounds this afternoon   with the transplant team including SW, physicians, fellows,  mid-level providers, and transplant coordinators.Discussed plan of care, including Creatinine better today. RHC with biopsy done today. US of groin today. Stopped trung for high levels. Possible discharge Thursday or Friday.

## 2020-01-07 NOTE — PT/OT/SLP EVAL
Occupational Therapy   Evaluation    Name: Deborah Navas  MRN: [id number]  Admitting Diagnosis:  GLO (acute kidney injury) Day of Surgery    Recommendations:     Discharge Recommendations: home health OT  Discharge Equipment Recommendations:  none  Barriers to discharge:  None    Assessment:     Deborah Navas is a 49 y.o. female with a medical diagnosis of GLO (acute kidney injury). Pt is fairly (I) with ADLs but presents with shakiness, decreased endurance and overall diminished strength limiting normal participation in functional tasks. Performance deficits affecting function: weakness, gait instability, impaired endurance, impaired balance, impaired self care skills, impaired functional mobilty, decreased coordination.      Rehab Prognosis: Good; patient would benefit from acute skilled OT services to address these deficits and reach maximum level of function.       Plan:     Patient to be seen 3 x/week to address the above listed problems via self-care/home management, therapeutic activities, therapeutic exercises  · Plan of Care Expires: 02/06/20  · Plan of Care Reviewed with: patient    Subjective     Chief Complaint: Shaking/SOB/Weak  Patient/Family Comments/goals: get stronger.    Occupational Profile:   S/P Heart Transplant - 12/16/19  Living Environment: Pt lives alone, with 2 cats, apartment, 2 ZURI.   Prior to admission, patients level of function was I with all functional mobility and ADLs. Pt was t/fd to [facility] after acute stay then went home briefly but returned to [facility] due to abnormal labs.  Equipment used at home: none.  DME owned (not currently used): none.  Upon discharge, patient will have assistance from mother.    Pain/Comfort:  · Pain Rating 1: 0/10    Patients cultural, spiritual, Sabianism conflicts given the current situation:      Objective:     Communicated with: rn prior to session.  Patient found EOB with   upon OT entry to room.    General Precautions: Standard,  sternal, mask on outside of room  Orthopedic Precautions:      Occupational Performance:    Bed Mobility:    · Independent    Functional Mobility/Transfers:  · Patient completed Sit <> Stand Transfer with stand by assistance  with  no assistive device   · Patient completed Bed <> Chair Transfer using Step Transfer technique with stand by assistance with no assistive device  · Functional Mobility: Pt walked ~ 80' x 2 HHA requiring a sit rest break after 40' due to SOB/weakness.    Activities of Daily Living:  · Feeding:  independence   · Grooming: independence  · Lower Body Dressing: supervision     Cognitive/Visual Perceptual:  Cognitive/Psychosocial Skills:     -       Oriented to: Person, Place, Time and Situation   -       Safety awareness/insight to disability: intact     Physical Exam:  BUE AROM/MMT: WFL (adhering to sternal precautions).    AMPAC 6 Click ADL:  AMPAC Total Score: 23    Treatment & Education:  UE ROM/MMT  Bed mobility training / assessment  Functional mobility assessment  Sit/standing balance assessment  Educated on importance of sitting OOB in bedside chair to promote increased strength, endurance & breathing.  Discussed OT POC / Post-acute plan  Education:    Patient left EOB with call button in reach    GOALS:   Multidisciplinary Problems     Occupational Therapy Goals        Problem: Occupational Therapy Goal    Goal Priority Disciplines Outcome Interventions   Occupational Therapy Goal     OT, PT/OT Ongoing, Progressing    Description:  Goals to be met by: 1/14/20     Patient will increase functional independence with ADLs by performing:    Grooming while standing at sink with [place].  Toilet transfer to toilet with [place].  Functional mobility at household distance with Mod (I).  UE therex HEP (within sternal precautions in mind).                      History:     Past Medical History:   Diagnosis Date    Fractures     History of ventricular fibrillation 9/4/2019    History  of ventricular tachycardia 9/4/2019    Hyperlipidemia     Migraine     Multinodular goiter 9/5/2019    Osteoarthritis        Past Surgical History:   Procedure Laterality Date    BACK SURGERY      2007    BIOPSY WITH ULTRASOUND GUIDANCE N/A 12/31/2019    Procedure: BIOPSY, WITH US GUIDANCE;  Surgeon: Eric Antunez Jr., MD;  Location: [facility] CATH LAB;  Service: Cardiology;  Laterality: N/A;    BONE GRAFT Left     from Left hip to Left FA    eardrum reconstruction  1980    ELBOW SURGERY Left [id number]    FOREARM FRACTURE SURGERY Bilateral [id number]    multiple surgeries    HEART TRANSPLANT N/A 12/16/2019    Procedure: TRANSPLANT, HEART;  Surgeon: Talha Nuñez MD;  [facility];  Service: Cardiothoracic;  Laterality: N/A;    INSERTION OF GRAFT TO PERICARDIUM  9/10/2019    Procedure: INSERTION, GRAFT, PERICARDIUM;  Surgeon: Shar Walden MD;  [facility];  Service: Cardiovascular;;    INSERTION OF IMPLANTABLE CARDIOVERTER-DEFIBRILLATOR (ICD) GENERATOR WITH TWO EXISTING LEADS      INSERTION OF PACEMAKER Left     LEFT VENTRICULAR ASSIST DEVICE N/A 9/10/2019    Procedure: INSERTION-LEFT VENTRICULAR ASSIST DEVICE;  Surgeon: Shar Walden MD;  [facility];  Service: Cardiovascular;  Laterality: N/A;  DT HM3     LUMBAR FUSION  2007    L4-L5    RIGHT HEART CATHETERIZATION Right 9/4/2019    Procedure: INSERTION, CATHETER, RIGHT HEART;  Surgeon: Vi Bryant MD;  Location: [facility] CATH LAB;  Service: Cardiology;  Laterality: Right;    RIGHT HEART CATHETERIZATION N/A 11/18/2019    Procedure: INSERTION, CATHETER, RIGHT HEART;  Surgeon: Eric Antunez Jr., MD;  Location: [facility] CATH LAB;  Service: Cardiology;  Laterality: N/A;    RIGHT HEART CATHETERIZATION Right 12/31/2019    Procedure: INSERTION, CATHETER, RIGHT HEART;  Surgeon: Eric Antunez Jr., MD;  Location: [facility] CATH LAB;  Service: Cardiology;  Laterality: Right;    SINUS SURGERY Right 1994     with lymph nodes    STERNAL WOUND CLOSURE  9/10/2019    Procedure: CLOSURE, WOUND, STERNUM;  Surgeon: Shar Walden MD;  [facility];  Service: Cardiovascular;;    TEMPOROMANDIBULAR JOINT SURGERY Right 1988    TONSILLECTOMY      TREATMENT OF CARDIAC ARRHYTHMIA N/A 9/24/2019    Procedure: CARDIOVERSION;  Surgeon: Moe Barrera MD;  Location: [facility] EP LAB;  Service: Cardiology;  Laterality: N/A;  AF, DCCV/NADINE, anes, DM, Rm 3073    TYMPANOSTOMY TUBE PLACEMENT  1971- 1979    multiple tube placements       Time Tracking:     OT Date of Treatment: 01/07/20  OT Start Time: 1139  OT Stop Time: 1203  OT Total Time (min): 24 min    Billable Minutes:Evaluation 14  Therapeutic Activity 10    JAGJIT Jensen  1/7/2020

## 2020-01-07 NOTE — BRIEF OP NOTE
[facility]  Brief Operative Note  Cardiology    SUMMARY     Surgery Date: 1/7/2020     Renetta Chaudhari MD    Surgeon(s) and Role:     * Renetta Chaudhari MD - Primary    Assisting Surgeon: None    Pre-op Diagnosis:  S/p OHTx    Post-op Diagnosis: s/p OHTx    Procedure Performed: Echo guided RV biopsy and RHC    Description of the findings of the procedure: Echo guided RV biopsy performed via the right IJ. Patient tolerated the procedure well. 5 myocardial pieces obtained. 4 sent for H&E and 1 sent for Immuno. In order to adequately assess the patient and form an appropriate treatment plan, it is necessary to perform a comprehensive right heart catheterization as this patient is presenting with fluid overload and acute renal failure.   A thorough study of the patient's cardiac and hemodynamic functioning, in addition to an endomyocardial biopsy, was performed.  The information to be obtained from the biopsy alone was insufficient to care for this patient. RHC performed via the right IJ. Patient tolerated the procedure well. Upper limit of normal left and right side filling pressures (RA= 12, PCWP=15 mm of Hg). Normal pulmonary pressures  (PA=35/20 mm of Hg). High cardiac index and output  (CI=3.8 L/min/m2, CO=8.3 L/min).    Estimated Blood Loss: < 10 cc    Plan:   - Routine post-cath care  - Echo report to follow separately.  - Follow-up biopsy results and adjust regimen accordingly.    Renetta Chaudhari MD

## 2020-01-07 NOTE — INTERVAL H&P NOTE
Patient seen and examined. No interval change since last H&P. Here for a RHC and an echo guided RV biopsy.  In order to adequately assess the patient and form an appropriate treatment plan, it is necessary to perform a comprehensive right heart catheterization as this patient is presenting with fluid overload and acute renal failure.   A thorough study of the patient's cardiac and hemodynamic functioning, in addition to an endomyocardial biopsy, was performed.  The information to be obtained from the biopsy alone was insufficient to care for this patient.  Access via the right IJ  7 Fr venous sheath  Risks and benefits of the procedure were explained to the patient. All questions were answered.  Consents were signed and in the chart.    Renetta Chaudhari MD

## 2020-01-07 NOTE — PT/OT/SLP EVAL
Physical Therapy Evaluation    Patient Name:  Deborah Navas   MRN:  [id number]    Recommendations:     Discharge Recommendations:  home health PT   Discharge Equipment Recommendations: none   Barriers to discharge: None    Assessment:     Deborah Navas is a 49 y.o. female admitted with a medical diagnosis of GLO (acute kidney injury).  She presents with the following impairments/functional limitations:  weakness, impaired endurance, impaired self care skills, impaired functional mobilty, gait instability, impaired balance, impaired cardiopulmonary response to activity Pt. cooperative and tolerated treatment fairly well, but fatigues quickly. Pt. with UE/LE tremors present at times.    Rehab Prognosis: Good; patient would benefit from acute skilled PT services to address these deficits and reach maximum level of function.    Recent Surgery: Procedure(s) (LRB):  BIOPSY, WITH US GUIDANCE (N/A)  INSERTION, CATHETER, RIGHT HEART (Right) Day of Surgery    Plan:     During this hospitalization, patient to be seen 3 x/week to address the identified rehab impairments via gait training, therapeutic activities, therapeutic exercises and progress toward the following goals:    · Plan of Care Expires:  02/06/20    Subjective     Chief Complaint: weakness and tremors  Patient/Family Comments/goals: to get stronger  Pain/Comfort:  · Pain Rating 1: 0/10  · Pain Rating Post-Intervention 1: 0/10    Patients cultural, spiritual, Yazdanism conflicts given the current situation: no    Living Environment:  Pt. Lives alone in apartment.   Prior to admission, patients level of function was indep.  Equipment used at home: none.  Upon discharge, patient will have assistance from mother.    Objective:     Communicated with nursing prior to session.  Patient found seated on EOB with telemetry, peripheral IV, pulse ox (continuous)  upon PT entry to room.    General Precautions: Standard, sternal, neutropenic   Orthopedic  Precautions:N/A   Braces:       Exams:  · RLE ROM: WFL  · RLE Strength: WFL  · LLE ROM: WFL  · LLE Strength: WFL    Functional Mobility:  · Transfers:     · Sit to Stand:  stand by assistance with no AD  · Bed to Chair: stand by assistance with  no AD  using  Stand Pivot  · Gait: 40', 50' and 90' respectively with HHA/CGA and followed by rolling bedside chair. Pt. with seated rest break between gait trials. Pt. amb. with decreased step length/jonathan.  · Balance: fair      Therapeutic Activities and Exercises:   Discussed therapy needs, goals, and POC.    AM-PAC 6 CLICK MOBILITY  Total Score:20     Patient left up in chair with all lines intact and call button in reach.    GOALS:   Multidisciplinary Problems     Physical Therapy Goals        Problem: Physical Therapy Goal    Goal Priority Disciplines Outcome Goal Variances Interventions   Physical Therapy Goal     PT, PT/OT Ongoing, Progressing     Description:  Goals to be met by: 2020     Patient will increase functional independence with mobility by performin. Supine to sit with Set-up [place]  2. Sit to supine with Set-up [place]  3. Sit to stand transfer with Supervision  4. Bed to chair transfer with Supervision  5. Gait  x 150 feet with Supervision without AD.   6. Lower extremity exercise program x15 reps per handout, with supervision                      History:     Past Medical History:   Diagnosis Date    Fractures     History of ventricular fibrillation 2019    History of ventricular tachycardia 2019    Hyperlipidemia     Migraine     Multinodular goiter 2019    Osteoarthritis        Past Surgical History:   Procedure Laterality Date    BACK SURGERY          BIOPSY WITH ULTRASOUND GUIDANCE N/A 2019    Procedure: BIOPSY, WITH US GUIDANCE;  Surgeon: Eric Antunez Jr., MD;  [facility]: [facility] CATH LAB;  Service: Cardiology;  Laterality: N/A;    BONE GRAFT Left     from Left hip to Left FA    eardrum  reconstruction  1980    ELBOW SURGERY Left [id number]    FOREARM FRACTURE SURGERY Bilateral [id number]    multiple surgeries    HEART TRANSPLANT N/A 12/16/2019    Procedure: TRANSPLANT, HEART;  Surgeon: Talha Nuñez MD;  Location: [address];  Service: Cardiothoracic;  Laterality: N/A;    INSERTION OF GRAFT TO PERICARDIUM  9/10/2019    Procedure: INSERTION, GRAFT, PERICARDIUM;  Surgeon: Shar Walden MD;  Location: [facility] OR [address] [place] [address];  Service: Cardiovascular;;    INSERTION OF IMPLANTABLE CARDIOVERTER-DEFIBRILLATOR (ICD) GENERATOR WITH TWO EXISTING LEADS      INSERTION OF PACEMAKER Left     LEFT VENTRICULAR ASSIST DEVICE N/A 9/10/2019    Procedure: INSERTION-LEFT VENTRICULAR ASSIST DEVICE;  Surgeon: Shar Walden MD;  Location: [facility] OR [address] [place] [address];  Service: Cardiovascular;  Laterality: N/A;  DT HM3     LUMBAR FUSION  2007    L4-L5    RIGHT HEART CATHETERIZATION Right 9/4/2019    Procedure: INSERTION, CATHETER, RIGHT HEART;  Surgeon: Vi Bryant MD;  Location: [facility] CATH LAB;  Service: Cardiology;  Laterality: Right;    RIGHT HEART CATHETERIZATION N/A 11/18/2019    Procedure: INSERTION, CATHETER, RIGHT HEART;  Surgeon: Eric Antunez Jr., MD;  Location: [facility] CATH LAB;  Service: Cardiology;  Laterality: N/A;    RIGHT HEART CATHETERIZATION Right 12/31/2019    Procedure: INSERTION, CATHETER, RIGHT HEART;  Surgeon: Eric Antunez Jr., MD;  Location: [facility] CATH LAB;  Service: Cardiology;  Laterality: Right;    SINUS SURGERY Right 1994    with lymph nodes    STERNAL WOUND CLOSURE  9/10/2019    Procedure: CLOSURE, WOUND, STERNUM;  Surgeon: Shar Walden MD;  Location: [facility] OR [address] [place] [address];  Service: Cardiovascular;;    TEMPOROMANDIBULAR JOINT SURGERY Right 1988    TONSILLECTOMY      TREATMENT OF CARDIAC ARRHYTHMIA N/A 9/24/2019    Procedure: CARDIOVERSION;  Surgeon: Moe Barrera MD;  Location: [facility] EP LAB;  Service: Cardiology;  Laterality: N/A;  AF, DCCV/NADINE, anes, DM, Rm 3073     TYMPANOSTOMY TUBE PLACEMENT  1971- 1979    multiple tube placements       Time Tracking:     PT Received On: 01/07/20  PT Start Time: 1140     PT Stop Time: 1200  PT Total Time (min): 20 min     Billable Minutes: Evaluation 10 and Gait Training 10      Joaquin Capellan, PT  01/07/2020

## 2020-01-07 NOTE — PLAN OF CARE
Pt is AAOx3.Pt is ambulatory and independent.Pt able to perform all ADL's without assistance. Pt is in good spirits.  NAD noted.Telly monitoring on going. Standard precautions maintained.  No breakdown noted, po fluids encouraged.Pt denies pian and/or discomfort at this time.Pt remains injury and fall free, non skid footwear donned, call light within reach, personal items within reach, bed in low/locked position, pt able to voice needs all needs voiced have been met at this time.

## 2020-01-07 NOTE — PLAN OF CARE
hospitals Cross Cover    Follow up hemodynamics     6:10 pm   CVP 13   MAP 84   SvO2 53   CO 6.3   CI 2.8          - Will stop dobutamine for now. Repeat CVP and SvO2 in 2 hours.    Discussed with hospitals fellow    Alphonse Valdes MD  Cardiology Fellow, PGY4

## 2020-01-08 LAB
ALBUMIN SERPL BCP-MCNC: 2.8 G/DL (ref 3.5–5.2)
ALP SERPL-CCNC: 86 U/L (ref 55–135)
ALT SERPL W/O P-5'-P-CCNC: 9 U/L (ref 10–44)
ANION GAP SERPL CALC-SCNC: 12 MMOL/L (ref 8–16)
AST SERPL-CCNC: 9 U/L (ref 10–40)
BASOPHILS # BLD AUTO: 0.01 K/UL (ref 0–0.2)
BASOPHILS NFR BLD: 0.2 % (ref 0–1.9)
BILIRUB SERPL-MCNC: 0.4 MG/DL (ref 0.1–1)
BUN SERPL-MCNC: 32 MG/DL (ref 6–20)
CALCIUM SERPL-MCNC: 9.1 MG/DL (ref 8.7–10.5)
CHLORIDE SERPL-SCNC: 98 MMOL/L (ref 95–110)
CO2 SERPL-SCNC: 29 MMOL/L (ref 23–29)
CREAT SERPL-MCNC: 2.8 MG/DL (ref 0.5–1.4)
DIFFERENTIAL METHOD: ABNORMAL
EOSINOPHIL # BLD AUTO: 0.1 K/UL (ref 0–0.5)
EOSINOPHIL NFR BLD: 2.2 % (ref 0–8)
ERYTHROCYTE [DISTWIDTH] IN BLOOD BY AUTOMATED COUNT: 16.9 % (ref 11.5–14.5)
EST. GFR  (AFRICAN AMERICAN): 21.9 ML/MIN/1.73 M^2
EST. GFR  (NON AFRICAN AMERICAN): 19 ML/MIN/1.73 M^2
FINAL PATHOLOGIC DIAGNOSIS: NORMAL
GLUCOSE SERPL-MCNC: 99 MG/DL (ref 70–110)
GROSS: NORMAL
HCT VFR BLD AUTO: 27 % (ref 37–48.5)
HGB BLD-MCNC: 8.1 G/DL (ref 12–16)
IMM GRANULOCYTES # BLD AUTO: 0.03 K/UL (ref 0–0.04)
IMM GRANULOCYTES NFR BLD AUTO: 0.5 % (ref 0–0.5)
LYMPHOCYTES # BLD AUTO: 0.2 K/UL (ref 1–4.8)
LYMPHOCYTES NFR BLD: 2.9 % (ref 18–48)
MAGNESIUM SERPL-MCNC: 2.3 MG/DL (ref 1.6–2.6)
MCH RBC QN AUTO: 26.8 PG (ref 27–31)
MCHC RBC AUTO-ENTMCNC: 30 G/DL (ref 32–36)
MCV RBC AUTO: 89 FL (ref 82–98)
MONOCYTES # BLD AUTO: 0.3 K/UL (ref 0.3–1)
MONOCYTES NFR BLD: 4.2 % (ref 4–15)
NEUTROPHILS # BLD AUTO: 5.3 K/UL (ref 1.8–7.7)
NEUTROPHILS NFR BLD: 90 % (ref 38–73)
NRBC BLD-RTO: 0 /100 WBC
PLATELET # BLD AUTO: 374 K/UL (ref 150–350)
PMV BLD AUTO: 9.1 FL (ref 9.2–12.9)
POTASSIUM SERPL-SCNC: 3.7 MMOL/L (ref 3.5–5.1)
PROT SERPL-MCNC: 5.9 G/DL (ref 6–8.4)
RBC # BLD AUTO: 3.02 M/UL (ref 4–5.4)
SODIUM SERPL-SCNC: 139 MMOL/L (ref 136–145)
TACROLIMUS BLD-MCNC: 6.6 NG/ML (ref 5–15)
WBC # BLD AUTO: 5.93 K/UL (ref 3.9–12.7)

## 2020-01-08 PROCEDURE — 25000003 PHARM REV CODE 250: Performed by: STUDENT IN AN ORGANIZED HEALTH CARE EDUCATION/TRAINING PROGRAM

## 2020-01-08 PROCEDURE — 99233 SBSQ HOSP IP/OBS HIGH 50: CPT | Mod: ,,, | Performed by: INTERNAL MEDICINE

## 2020-01-08 PROCEDURE — 93010 EKG 12-LEAD: ICD-10-PCS | Mod: ,,, | Performed by: INTERNAL MEDICINE

## 2020-01-08 PROCEDURE — 80053 COMPREHEN METABOLIC PANEL: CPT

## 2020-01-08 PROCEDURE — 85025 COMPLETE CBC W/AUTO DIFF WBC: CPT

## 2020-01-08 PROCEDURE — 63600175 PHARM REV CODE 636 W HCPCS: Performed by: INTERNAL MEDICINE

## 2020-01-08 PROCEDURE — 83735 ASSAY OF MAGNESIUM: CPT

## 2020-01-08 PROCEDURE — 36415 COLL VENOUS BLD VENIPUNCTURE: CPT

## 2020-01-08 PROCEDURE — 93005 ELECTROCARDIOGRAM TRACING: CPT

## 2020-01-08 PROCEDURE — 63600175 PHARM REV CODE 636 W HCPCS: Performed by: STUDENT IN AN ORGANIZED HEALTH CARE EDUCATION/TRAINING PROGRAM

## 2020-01-08 PROCEDURE — 93010 ELECTROCARDIOGRAM REPORT: CPT | Mod: ,,, | Performed by: INTERNAL MEDICINE

## 2020-01-08 PROCEDURE — 99233 PR SUBSEQUENT HOSPITAL CARE,LEVL III: ICD-10-PCS | Mod: ,,, | Performed by: INTERNAL MEDICINE

## 2020-01-08 PROCEDURE — 80197 ASSAY OF TACROLIMUS: CPT

## 2020-01-08 PROCEDURE — 20600001 HC STEP DOWN PRIVATE ROOM

## 2020-01-08 PROCEDURE — 25000003 PHARM REV CODE 250: Performed by: INTERNAL MEDICINE

## 2020-01-08 RX ORDER — PANTOPRAZOLE SODIUM 40 MG/1
40 TABLET, DELAYED RELEASE ORAL DAILY
Status: DISCONTINUED | OUTPATIENT
Start: 2020-01-08 | End: 2020-01-10 | Stop reason: HOSPADM

## 2020-01-08 RX ORDER — HYDRALAZINE HYDROCHLORIDE 25 MG/1
25 TABLET, FILM COATED ORAL EVERY 8 HOURS
Status: DISCONTINUED | OUTPATIENT
Start: 2020-01-08 | End: 2020-01-08

## 2020-01-08 RX ORDER — TACROLIMUS 1 MG/1
2 CAPSULE ORAL EVERY MORNING
Status: DISCONTINUED | OUTPATIENT
Start: 2020-01-09 | End: 2020-01-09

## 2020-01-08 RX ORDER — FUROSEMIDE 40 MG/1
40 TABLET ORAL DAILY
Status: DISCONTINUED | OUTPATIENT
Start: 2020-01-08 | End: 2020-01-10 | Stop reason: HOSPADM

## 2020-01-08 RX ORDER — TACROLIMUS 1 MG/1
3 CAPSULE ORAL EVERY EVENING
Status: DISCONTINUED | OUTPATIENT
Start: 2020-01-08 | End: 2020-01-10 | Stop reason: HOSPADM

## 2020-01-08 RX ORDER — HYDRALAZINE HYDROCHLORIDE 50 MG/1
50 TABLET, FILM COATED ORAL EVERY 8 HOURS
Status: DISCONTINUED | OUTPATIENT
Start: 2020-01-08 | End: 2020-01-09

## 2020-01-08 RX ADMIN — MYCOPHENOLATE MOFETIL 1000 MG: 250 CAPSULE ORAL at 07:01

## 2020-01-08 RX ADMIN — PANTOPRAZOLE SODIUM 40 MG: 40 TABLET, DELAYED RELEASE ORAL at 12:01

## 2020-01-08 RX ADMIN — MYCOPHENOLATE MOFETIL 1000 MG: 250 CAPSULE ORAL at 09:01

## 2020-01-08 RX ADMIN — TACROLIMUS 3 MG: 1 CAPSULE ORAL at 05:01

## 2020-01-08 RX ADMIN — MENTHOL, METHYL SALICYLATE: 10; 15 CREAM TOPICAL at 07:01

## 2020-01-08 RX ADMIN — ATORVASTATIN CALCIUM 20 MG: 20 TABLET, FILM COATED ORAL at 09:01

## 2020-01-08 RX ADMIN — DOCUSATE SODIUM 50 MG: 50 CAPSULE, LIQUID FILLED ORAL at 02:01

## 2020-01-08 RX ADMIN — SITAGLIPTIN 50 MG: 50 TABLET, FILM COATED ORAL at 09:01

## 2020-01-08 RX ADMIN — HYDRALAZINE HYDROCHLORIDE 25 MG: 25 TABLET, FILM COATED ORAL at 09:01

## 2020-01-08 RX ADMIN — NYSTATIN 500000 UNITS: 100000 SUSPENSION ORAL at 07:01

## 2020-01-08 RX ADMIN — DOCUSATE SODIUM 50 MG: 50 CAPSULE, LIQUID FILLED ORAL at 07:01

## 2020-01-08 RX ADMIN — FERROUS SULFATE TAB EC 325 MG (65 MG FE EQUIVALENT) 325 MG: 325 (65 FE) TABLET DELAYED RESPONSE at 09:01

## 2020-01-08 RX ADMIN — GABAPENTIN 300 MG: 300 CAPSULE ORAL at 07:01

## 2020-01-08 RX ADMIN — POLYETHYLENE GLYCOL 3350 17 G: 17 POWDER, FOR SOLUTION ORAL at 09:01

## 2020-01-08 RX ADMIN — DOCUSATE SODIUM 50 MG: 50 CAPSULE, LIQUID FILLED ORAL at 09:01

## 2020-01-08 RX ADMIN — FUROSEMIDE 40 MG: 40 TABLET ORAL at 09:01

## 2020-01-08 RX ADMIN — NYSTATIN 500000 UNITS: 100000 SUSPENSION ORAL at 09:01

## 2020-01-08 RX ADMIN — AMLODIPINE BESYLATE 10 MG: 10 TABLET ORAL at 09:01

## 2020-01-08 RX ADMIN — ZOLPIDEM TARTRATE 5 MG: 5 TABLET ORAL at 09:01

## 2020-01-08 RX ADMIN — MIRTAZAPINE 30 MG: 15 TABLET, FILM COATED ORAL at 07:01

## 2020-01-08 RX ADMIN — ASPIRIN 81 MG: 81 TABLET, COATED ORAL at 09:01

## 2020-01-08 RX ADMIN — TACROLIMUS 2 MG: 1 CAPSULE ORAL at 09:01

## 2020-01-08 RX ADMIN — OYSTER SHELL CALCIUM WITH VITAMIN D 2 TABLET: 500; 200 TABLET, FILM COATED ORAL at 09:01

## 2020-01-08 RX ADMIN — SULFAMETHOXAZOLE AND TRIMETHOPRIM 1 TABLET: 400; 80 TABLET ORAL at 09:01

## 2020-01-08 RX ADMIN — MENTHOL, METHYL SALICYLATE: 10; 15 CREAM TOPICAL at 09:01

## 2020-01-08 RX ADMIN — PREDNISONE 15 MG: 10 TABLET ORAL at 09:01

## 2020-01-08 RX ADMIN — HYDRALAZINE HYDROCHLORIDE 25 MG: 25 TABLET, FILM COATED ORAL at 02:01

## 2020-01-08 RX ADMIN — POTASSIUM BICARBONATE 50 MEQ: 978 TABLET, EFFERVESCENT ORAL at 09:01

## 2020-01-08 RX ADMIN — FAMOTIDINE 20 MG: 20 TABLET ORAL at 10:01

## 2020-01-08 RX ADMIN — HEPARIN SODIUM 5000 UNITS: 5000 INJECTION, SOLUTION INTRAVENOUS; SUBCUTANEOUS at 07:01

## 2020-01-08 RX ADMIN — Medication 400 MG: at 09:01

## 2020-01-08 RX ADMIN — NYSTATIN 500000 UNITS: 100000 SUSPENSION ORAL at 05:01

## 2020-01-08 RX ADMIN — VENLAFAXINE HYDROCHLORIDE 150 MG: 37.5 CAPSULE, EXTENDED RELEASE ORAL at 09:01

## 2020-01-08 RX ADMIN — NYSTATIN 500000 UNITS: 100000 SUSPENSION ORAL at 12:01

## 2020-01-08 RX ADMIN — HEPARIN SODIUM 5000 UNITS: 5000 INJECTION, SOLUTION INTRAVENOUS; SUBCUTANEOUS at 09:01

## 2020-01-08 RX ADMIN — HYDRALAZINE HYDROCHLORIDE 50 MG: 50 TABLET, FILM COATED ORAL at 09:01

## 2020-01-08 RX ADMIN — MENTHOL, METHYL SALICYLATE: 10; 15 CREAM TOPICAL at 02:01

## 2020-01-08 NOTE — NURSING
Rounds Report: Attended interdisciplinary rounds this afternoon   with the transplant team including SW, physicians, fellows,  mid-level providers, and transplant coordinators.Discussed plan of care, including repeat US of groin shows larger fluid collection, will get consult with surgery. Still HTN issues. Possible DC Thursday or Friday.

## 2020-01-08 NOTE — PLAN OF CARE
- Pt is independent & ambulatory, no c/o dizziness.  - NSR in 90s on telemetry    - Next Theophyline level per MD order, improvement noted in spasticity per patient  - Creat 2.8, trend on labs  - Daily EKG ordered  - Keep bed low & locked, call light in reach, nonslip socks in use, calls for assistance appropriately, caregiver at bedside  - CTS consulted by [facility] for right groin swelling (swelling under right groin incision, at least 18 cm x 2 cm)

## 2020-01-08 NOTE — NURSING
Pt with c/o of feeling dizzy and light headed when standing since around 315pm. Pt received 80mg iv lasix around 230. Pt not orthostatic. HTS on call Dr. Hunter notified. Ordered to hold 9pm of lasix. Will continue to monitor.

## 2020-01-08 NOTE — SUBJECTIVE & OBJECTIVE
Interval events:  Patient was seen and examined this morning at bedside. No acute overnight events. Today she reports some improvement of shoulder pain. Still c/o significant tremors. BP overnight elevated up to 140/70s.    Past Medical History:   Diagnosis Date    Fractures     History of ventricular fibrillation 9/4/2019    History of ventricular tachycardia 9/4/2019    Hyperlipidemia     Migraine     Osteoarthritis        Past Surgical History:   Procedure Laterality Date    BACK SURGERY      2007    BIOPSY WITH ULTRASOUND GUIDANCE N/A 12/31/2019    Procedure: BIOPSY, WITH US GUIDANCE;  Surgeon: Eric Antunez Jr., MD;  Location: [facility] CATH LAB;  Service: Cardiology;  Laterality: N/A;    BONE GRAFT Left     from Left hip to Left FA    eardrum reconstruction  1980    ELBOW SURGERY Left [id number]    FOREARM FRACTURE SURGERY Bilateral [id number]    multiple surgeries    HEART TRANSPLANT N/A 12/16/2019    Procedure: TRANSPLANT, HEART;  Surgeon: Talha Nuñez MD;  Location: [facility] OR [address] [place] [address];  Service: Cardiothoracic;  Laterality: N/A;    INSERTION OF GRAFT TO PERICARDIUM  9/10/2019    Procedure: INSERTION, GRAFT, PERICARDIUM;  Surgeon: Shar Walden MD;  Location: [facility] OR [address] [place] [address];  Service: Cardiovascular;;    INSERTION OF IMPLANTABLE CARDIOVERTER-DEFIBRILLATOR (ICD) GENERATOR WITH TWO EXISTING LEADS      INSERTION OF PACEMAKER Left     LEFT VENTRICULAR ASSIST DEVICE N/A 9/10/2019    Procedure: INSERTION-LEFT VENTRICULAR ASSIST DEVICE;  Surgeon: Shar Walden MD;  Location: [facility] OR [address] [place] [address];  Service: Cardiovascular;  Laterality: N/A;  DT HM3     LUMBAR FUSION  2007    L4-L5    RIGHT HEART CATHETERIZATION Right 9/4/2019    Procedure: INSERTION, CATHETER, RIGHT HEART;  Surgeon: Vi Bryant MD;  Location: [facility] CATH LAB;  Service: Cardiology;  Laterality: Right;    RIGHT HEART CATHETERIZATION N/A 11/18/2019    Procedure: INSERTION, CATHETER, RIGHT HEART;  Surgeon: Eric Antunez  MD Jil;  Location: [facility] CATH LAB;  Service: Cardiology;  Laterality: N/A;    RIGHT HEART CATHETERIZATION Right 12/31/2019    Procedure: INSERTION, CATHETER, RIGHT HEART;  Surgeon: Eric Antunez Jr., MD;  Location: [facility] CATH LAB;  Service: Cardiology;  Laterality: Right;    SINUS SURGERY Right 1994    with lymph nodes    STERNAL WOUND CLOSURE  9/10/2019    Procedure: CLOSURE, WOUND, STERNUM;  Surgeon: Shar Walden MD;  [facility];  Service: Cardiovascular;;    TEMPOROMANDIBULAR JOINT SURGERY Right 1988    TONSILLECTOMY      TREATMENT OF CARDIAC ARRHYTHMIA N/A 9/24/2019    Procedure: CARDIOVERSION;  Surgeon: Moe Barrera MD;  Location: [facility] EP LAB;  Service: Cardiology;  Laterality: N/A;  AF, DCCV/NADINE, anes, DM, Rm 3073    TYMPANOSTOMY TUBE PLACEMENT  1971- 1979    multiple tube placements       Review of patient's allergies indicates:   Allergen Reactions    Adhesive Blisters     Reaction to area in chest and up only    Codeine Itching       Current Facility-Administered Medications   Medication    acetaminophen tablet 650 mg    amLODIPine tablet 10 mg    aspirin EC tablet 81 mg    atorvastatin tablet 20 mg    calcium carbonate 200 mg calcium (500 mg) chewable tablet 500 mg    calcium-vitamin D3 500 mg(1,250mg) -200 unit per tablet 2 tablet    cyclobenzaprine tablet 5 mg    docusate sodium capsule 50 mg    famotidine tablet 20 mg    ferrous sulfate EC tablet 325 mg    furosemide tablet 40 mg    gabapentin capsule 300 mg    heparin (porcine) injection 5,000 Units    hydrALAZINE tablet 25 mg    magnesium oxide tablet 400 mg    methyl salicylate-menthol 15-10% cream    mirtazapine tablet 30 mg    mycophenolate capsule 1,000 mg    nystatin 100,000 unit/mL suspension 500,000 Units    oxyCODONE immediate release tablet Tab 10 mg    polyethylene glycol packet 17 g    potassium bicarbonate disintegrating tablet 50 mEq    predniSONE tablet 15 mg    promethazine  tablet 25 mg    senna-docusate 8.6-50 mg per tablet 2 tablet    SITagliptin tablet 50 mg    sodium chloride 0.9% flush 10 mL    sulfamethoxazole-trimethoprim 400-80mg per tablet 1 tablet    tacrolimus capsule 2 mg    valGANciclovir tablet 450 mg    venlafaxine 24 hr capsule 150 mg    zolpidem tablet 5 mg     Family History     Problem Relation (Age of Onset)    Broken bones Maternal Grandmother    Cancer Paternal Grandmother    Dislocations Maternal Grandmother    Heart failure Paternal Grandfather, Maternal Grandfather    Migraines Mother, Maternal Grandmother, Paternal Grandmother, Paternal Grandfather, Maternal Grandfather    Obesity Paternal Grandmother    Osteoarthritis Mother, Maternal Grandmother, Paternal Grandmother, Paternal Grandfather, Maternal Grandfather, Father    Osteoporosis Maternal Grandmother    Scoliosis Maternal Grandmother    Stroke Father        Tobacco Use    Smoking status: Never Smoker    Smokeless tobacco: Never Used   Substance and Sexual Activity    Alcohol use: No    Drug use: No    Sexual activity: Not Currently     Review of Systems   All other systems reviewed and are negative.    Objective:     Vital Signs (Most Recent):  Temp: 97.7 °F (36.5 °C) (01/07/20 1931)  Pulse: 94 (01/08/20 0719)  Resp: 19 (01/08/20 0415)  BP: 119/70 (01/08/20 0415)  SpO2: 96 % (01/08/20 0415) Vital Signs (24h Range):  Temp:  [97.5 °F (36.4 °C)-98 °F (36.7 °C)] 97.7 °F (36.5 °C)  Pulse:  [] 94  Resp:  [13-19] 19  SpO2:  [94 %-98 %] 96 %  BP: (100-119)/(52-70) 119/70     Patient Vitals for the past 72 hrs (Last 3 readings):   Weight   01/07/20 0745 105.2 kg (232 lb)     Body mass index is 36.34 kg/m².      Intake/Output Summary (Last 24 hours) at 1/8/2020 0755  Last data filed at 1/8/2020 0300  Gross per 24 hour   Intake 1020 ml   Output 1000 ml   Net 20 ml       Physical Exam   Constitutional: She appears well-developed and well-nourished. No distress.   HENT:   Head: Normocephalic.    Eyes: Pupils are equal, round, and reactive to light. EOM are normal.   Neck:   Thick neck, unable to appreciate JVP, prominent EJ   Cardiovascular: Normal rate, regular rhythm and normal heart sounds.   No murmur heard.  Chest with midline surgical wound closed, no erythema or discharge.    Pulmonary/Chest: Effort normal and breath sounds normal. No respiratory distress.   Abdominal: Soft. She exhibits no distension. There is no tenderness.   Musculoskeletal:   Trace LE edema. R groin with clean closed wound, no discharge or erythema, small collection palpated, mildly tender   Skin: Skin is warm.       Significant Labs:  CBC:  Recent Labs   Lab 01/05/20  0430 01/06/20  0430 01/07/20  0530   WBC 6.45 6.39 6.17   RBC 2.88* 2.86* 2.97*   HGB 7.7* 7.6* 7.8*   HCT 25.1* 24.7* 25.9*   * 338 348   MCV 87 86 87   MCH 26.7* 26.6* 26.3*   MCHC 30.7* 30.8* 30.1*     BNP:  Recent Labs   Lab 01/02/20  0835   *     CMP:  Recent Labs   Lab 01/06/20  0430 01/07/20  0530 01/08/20  0620   * 107 99   CALCIUM 8.8 8.9 9.1   ALBUMIN 2.8* 2.6* 2.8*   PROT 5.7* 5.7* 5.9*    140 139   K 3.5 3.1* 3.7   CO2 30* 31* 29   CL 95 97 98   BUN 38* 33* 32*   CREATININE 3.2* 2.7* 2.8*   ALKPHOS 90 87 86   ALT 9* 10 9*   AST 9* 8* 9*   BILITOT 0.5 0.4 0.4      Coagulation:   No results for input(s): PT, INR, APTT in the last 168 hours.  LDH:  No results for input(s): LDH in the last 72 hours.  Microbiology:  Microbiology Results (last 7 days)     ** No results found for the last 168 hours. **          I have reviewed all pertinent labs within the past 24 hours.

## 2020-01-08 NOTE — PROGRESS NOTES
[facility]  Heart Transplant  Progress Note    Patient Name: Deborah Navas  MRN: [id number]  Admission Date: 1/2/2020  Hospital Length of Stay: 6 days  Attending Physician: Dr. Lua  Primary Care Provider: Valeria Verma MD  Principal Problem:GLO (acute kidney injury)    Subjective:     Interval events:  Patient was seen and examined this morning at bedside. No acute overnight events. Today she reports some improvement of shoulder pain. Still c/o significant tremors. BP overnight elevated up to 140/70s.    Past Medical History:   Diagnosis Date    Fractures     History of ventricular fibrillation 9/4/2019    History of ventricular tachycardia 9/4/2019    Hyperlipidemia     Migraine     Osteoarthritis        Past Surgical History:   Procedure Laterality Date    BACK SURGERY      2007    BIOPSY WITH ULTRASOUND GUIDANCE N/A 12/31/2019    Procedure: BIOPSY, WITH US GUIDANCE;  Surgeon: Eric Antunez Jr., MD;  Location: [facility] CATH LAB;  Service: Cardiology;  Laterality: N/A;    BONE GRAFT Left     from Left hip to Left FA    eardrum reconstruction  1980    ELBOW SURGERY Left [id number]    FOREARM FRACTURE SURGERY Bilateral [id number]    multiple surgeries    HEART TRANSPLANT N/A 12/16/2019    Procedure: TRANSPLANT, HEART;  Surgeon: Talha Nuñez MD;  Location: [facility] OR [address] [place] [address];  Service: Cardiothoracic;  Laterality: N/A;    INSERTION OF GRAFT TO PERICARDIUM  9/10/2019    Procedure: INSERTION, GRAFT, PERICARDIUM;  Surgeon: Shar Walden MD;  Location: [facility] OR [address] [place] [address];  Service: Cardiovascular;;    INSERTION OF IMPLANTABLE CARDIOVERTER-DEFIBRILLATOR (ICD) GENERATOR WITH TWO EXISTING LEADS      INSERTION OF PACEMAKER Left     LEFT VENTRICULAR ASSIST DEVICE N/A 9/10/2019    Procedure: INSERTION-LEFT VENTRICULAR ASSIST DEVICE;  Surgeon: Shar Walden MD;  Location: [facility] OR [address] [place] [address];  Service: Cardiovascular;  Laterality: N/A;  DT HM3     LUMBAR FUSION  2007    L4-L5     RIGHT HEART CATHETERIZATION Right 9/4/2019    Procedure: INSERTION, CATHETER, RIGHT HEART;  Surgeon: Vi Bryant MD;  Location: [facility] CATH LAB;  Service: Cardiology;  Laterality: Right;    RIGHT HEART CATHETERIZATION N/A 11/18/2019    Procedure: INSERTION, CATHETER, RIGHT HEART;  Surgeon: Eric Antunez Jr., MD;  Location: [facility] CATH LAB;  Service: Cardiology;  Laterality: N/A;    RIGHT HEART CATHETERIZATION Right 12/31/2019    Procedure: INSERTION, CATHETER, RIGHT HEART;  Surgeon: Eric Antunez Jr., MD;  Location: [facility] CATH LAB;  Service: Cardiology;  Laterality: Right;    SINUS SURGERY Right 1994    with lymph nodes    STERNAL WOUND CLOSURE  9/10/2019    Procedure: CLOSURE, WOUND, STERNUM;  Surgeon: Shar Walden MD;  Location: [facility] OR [address] [place] [address];  Service: Cardiovascular;;    TEMPOROMANDIBULAR JOINT SURGERY Right 1988    TONSILLECTOMY      TREATMENT OF CARDIAC ARRHYTHMIA N/A 9/24/2019    Procedure: CARDIOVERSION;  Surgeon: Moe Barrera MD;  Location: [facility] EP LAB;  Service: Cardiology;  Laterality: N/A;  AF, DCCV/NADINE, anes, DM, Rm 3073    TYMPANOSTOMY TUBE PLACEMENT  1971- 1979    multiple tube placements       Review of patient's allergies indicates:   Allergen Reactions    Adhesive Blisters     Reaction to area in chest and up only    Codeine Itching       Current Facility-Administered Medications   Medication    acetaminophen tablet 650 mg    amLODIPine tablet 10 mg    aspirin EC tablet 81 mg    atorvastatin tablet 20 mg    calcium carbonate 200 mg calcium (500 mg) chewable tablet 500 mg    calcium-vitamin D3 500 mg(1,250mg) -200 unit per tablet 2 tablet    cyclobenzaprine tablet 5 mg    docusate sodium capsule 50 mg    famotidine tablet 20 mg    ferrous sulfate EC tablet 325 mg    furosemide tablet 40 mg    gabapentin capsule 300 mg    heparin (porcine) injection 5,000 Units    hydrALAZINE tablet 25 mg    magnesium oxide tablet 400 mg    methyl salicylate-menthol  15-10% cream    mirtazapine tablet 30 mg    mycophenolate capsule 1,000 mg    nystatin 100,000 unit/mL suspension 500,000 Units    oxyCODONE immediate release tablet Tab 10 mg    polyethylene glycol packet 17 g    potassium bicarbonate disintegrating tablet 50 mEq    predniSONE tablet 15 mg    promethazine tablet 25 mg    senna-docusate 8.6-50 mg per tablet 2 tablet    SITagliptin tablet 50 mg    sodium chloride 0.9% flush 10 mL    sulfamethoxazole-trimethoprim 400-80mg per tablet 1 tablet    tacrolimus capsule 2 mg    valGANciclovir tablet 450 mg    venlafaxine 24 hr capsule 150 mg    zolpidem tablet 5 mg     Family History     Problem Relation (Age of Onset)    Broken bones Maternal Grandmother    Cancer Paternal Grandmother    Dislocations Maternal Grandmother    Heart failure Paternal Grandfather, Maternal Grandfather    Migraines Mother, Maternal Grandmother, Paternal Grandmother, Paternal Grandfather, Maternal Grandfather    Obesity Paternal Grandmother    Osteoarthritis Mother, Maternal Grandmother, Paternal Grandmother, Paternal Grandfather, Maternal Grandfather, Father    Osteoporosis Maternal Grandmother    Scoliosis Maternal Grandmother    Stroke Father        Tobacco Use    Smoking status: Never Smoker    Smokeless tobacco: Never Used   Substance and Sexual Activity    Alcohol use: No    Drug use: No    Sexual activity: Not Currently     Review of Systems   All other systems reviewed and are negative.    Objective:     Vital Signs (Most Recent):  Temp: 97.7 °F (36.5 °C) (01/07/20 1931)  Pulse: 94 (01/08/20 0719)  Resp: 19 (01/08/20 0415)  BP: 119/70 (01/08/20 0415)  SpO2: 96 % (01/08/20 0415) Vital Signs (24h Range):  Temp:  [97.5 °F (36.4 °C)-98 °F (36.7 °C)] 97.7 °F (36.5 °C)  Pulse:  [] 94  Resp:  [13-19] 19  SpO2:  [94 %-98 %] 96 %  BP: (100-119)/(52-70) 119/70     Patient Vitals for the past 72 hrs (Last 3 readings):   Weight   01/07/20 0745 105.2 kg (232 lb)     Body mass  index is 36.34 kg/m².      Intake/Output Summary (Last 24 hours) at 1/8/2020 0755  Last data filed at 1/8/2020 0300  Gross per 24 hour   Intake 1020 ml   Output 1000 ml   Net 20 ml       Physical Exam   Constitutional: She appears well-developed and well-nourished. No distress.   HENT:   Head: Normocephalic.   Eyes: Pupils are equal, round, and reactive to light. EOM are normal.   Neck:   Thick neck, unable to appreciate JVP, prominent EJ   Cardiovascular: Normal rate, regular rhythm and normal heart sounds.   No murmur heard.  Chest with midline surgical wound closed, no erythema or discharge.    Pulmonary/Chest: Effort normal and breath sounds normal. No respiratory distress.   Abdominal: Soft. She exhibits no distension. There is no tenderness.   Musculoskeletal:   Trace LE edema. R groin with clean closed wound, no discharge or erythema, small collection palpated, mildly tender   Skin: Skin is warm.       Significant Labs:  CBC:  Recent Labs   Lab 01/05/20  0430 01/06/20  0430 01/07/20  0530   WBC 6.45 6.39 6.17   RBC 2.88* 2.86* 2.97*   HGB 7.7* 7.6* 7.8*   HCT 25.1* 24.7* 25.9*   * 338 348   MCV 87 86 87   MCH 26.7* 26.6* 26.3*   MCHC 30.7* 30.8* 30.1*     BNP:  Recent Labs   Lab 01/02/20  0835   *     CMP:  Recent Labs   Lab 01/06/20  0430 01/07/20  0530 01/08/20  0620   * 107 99   CALCIUM 8.8 8.9 9.1   ALBUMIN 2.8* 2.6* 2.8*   PROT 5.7* 5.7* 5.9*    140 139   K 3.5 3.1* 3.7   CO2 30* 31* 29   CL 95 97 98   BUN 38* 33* 32*   CREATININE 3.2* 2.7* 2.8*   ALKPHOS 90 87 86   ALT 9* 10 9*   AST 9* 8* 9*   BILITOT 0.5 0.4 0.4      Coagulation:   No results for input(s): PT, INR, APTT in the last 168 hours.  LDH:  No results for input(s): LDH in the last 72 hours.  Microbiology:  Microbiology Results (last 7 days)     ** No results found for the last 168 hours. **          I have reviewed all pertinent labs within the past 24 hours.        Assessment and Plan:     48yo F s/p OHTx on  12/16/2019 for NICM/ HFrEF with HM3 on 9/2019, removed at time of transplant, as well as AFl post LVAD s/p NADINE/DCCV, VT/VF (some a/w hypokalemia), HLD, obesity, and OA, who presented for worsening LEGER and GLO. RHC on 12/31 with elevated filling pressures (RA 17, PCWP 15). Pathology of native heart revealed non-caseating granulomas typical of sarcoidosis. Presentation  was likely c/w cardiorenal syndrome in the setting of ADHF/ fluid overload. Started on IV diuretic with good response at this time. Initial EMB on 12/31 without evidence of rejection. Repeat RHC on 1/7 with upper normal filling pressures (RA 12, wedge 15) and high CO/CI. Currently with mild improvement of renal function.        Acute on chronic diastolic heart failure- improving     S/p OHTx on 12/16 for NICM, path revealed sarcoidosis. Initially hypervolemic, NYHA III  - Switch to furosemide 40mg PO daily  - BP elevated, continue amlodipine 10mg daily; will add hydralazine 25mg PO q8h  - Monitor electrolytes and replete for target K>4 and Mg>2  - TTE revealed LVEF 65% and normal RV function, mild MR, mod-sev TR, IVC not seen; no pericardial effusion appreciated  - RHC 1/7 with upper normal R and L filling pressures (RA 12, wedge 15) and elevated CO/CI (8.3/3.8)     Status post heart transplant  - Tacrolimus level slightly elevated on admission, today 6.6 (goal ~8)--> change to 2mg/3mg  - Theophylline levels elevated--> discontinued  - Continue mycophenolate 1000mg BID  - Continue prednisone 15mg daily  - Continue TMP/SMX and valganciclovir  - EMB:          12/31: negative for Ab or cell-mediated rejection          1/7: In progress  - Path of native heart revealed sarcoidosis, will need close f/u and possible chest/ extracardiac imaging     * GLO (acute kidney injury)- improving  Likely cardiorenal syndrome with renal venous congestion  - Diuresis as above  - Monitor renal function and electrolytes  - Renally dose meds  - Avoid nephrotoxins  -  UA WNL     * Normocytic anemia  - Iron panel c/w LYN  - Continue ferrous sulfate  - Monitor Hb      * R groin fluid collection- seroma vs hematoma      - Repeat R groin US on 1/2 shows decreased size of fluid collection, Repeat on 1/7 with interval increased size (11.5 x 0.2 x 7.8; from 7.4 x 3.9 x 2)      - CTS aware      - Continue monitoring     * Chronic pain      - Continue gabapentin      - Continue Tylenol and oxycodone PRN     *Shoulder pain- unclear etiology, likely MSK- improving      -Continue analgesic PRN      - Continue Flexeril and topical NSAID     * Miscellaneous      - Diet: Cardiac      - DVT ppx: Heparin SQ      - Code status: Full code       Triny Yusuf MD  Heart Transplant  [facility]

## 2020-01-08 NOTE — PLAN OF CARE
* AAO x 4  * Cardiac diet patient tolerating well. See chart for % eaten.   * Generalized ecchymosis noted   * Generalized edema noted no pitting edema.  * Bed at lowest position and locked, call light within reach, non-slip socks on, and side rails up x 2.  Encouraged patient to call for assistance when needed patient stated understanding. This RN visualized patient ambulating in room gait and balance WNL.  * Healing mid-sternal incision site clean dry and intact no signs or symptoms of infection noted.  * Healed right groin hematoma site  * Healed right lower quad old drive line site  * Healing left chest wall old AICD site  * RIJ trialysis (1/2/20) patent, dressing clean dry and intact no signs or symptoms of infection noted. Dressing due to be changed on 1/9/20  * Oxygen saturation 92-95 % on room air.  Respirations even and unlabored no signs or symptoms of distress noted.  * Patient has complaints of pain PRN flexeril administered see MAR   * Skin assessment preformed no breakdown noted.  * Standard precautions maintained.  * Continuous telemetry monitoring continued SR 80-90s.  * Patient able to turn self no assistance needed.  * Patient voiding clear yellow urine.  See flow sheet for intake and output totals.  * Visi continuous monitoring in use, see flow sheet for readings

## 2020-01-09 LAB
ALBUMIN SERPL BCP-MCNC: 3 G/DL (ref 3.5–5.2)
ALP SERPL-CCNC: 95 U/L (ref 55–135)
ALT SERPL W/O P-5'-P-CCNC: 11 U/L (ref 10–44)
ANION GAP SERPL CALC-SCNC: 14 MMOL/L (ref 8–16)
AST SERPL-CCNC: 11 U/L (ref 10–40)
BASOPHILS # BLD AUTO: 0.02 K/UL (ref 0–0.2)
BASOPHILS NFR BLD: 0.3 % (ref 0–1.9)
BILIRUB SERPL-MCNC: 0.4 MG/DL (ref 0.1–1)
BUN SERPL-MCNC: 30 MG/DL (ref 6–20)
CALCIUM SERPL-MCNC: 9.4 MG/DL (ref 8.7–10.5)
CHLORIDE SERPL-SCNC: 97 MMOL/L (ref 95–110)
CO2 SERPL-SCNC: 26 MMOL/L (ref 23–29)
CREAT SERPL-MCNC: 2.5 MG/DL (ref 0.5–1.4)
DIFFERENTIAL METHOD: ABNORMAL
EOSINOPHIL # BLD AUTO: 0.2 K/UL (ref 0–0.5)
EOSINOPHIL NFR BLD: 2.2 % (ref 0–8)
ERYTHROCYTE [DISTWIDTH] IN BLOOD BY AUTOMATED COUNT: 17 % (ref 11.5–14.5)
EST. GFR  (AFRICAN AMERICAN): 25.1 ML/MIN/1.73 M^2
EST. GFR  (NON AFRICAN AMERICAN): 21.7 ML/MIN/1.73 M^2
GLUCOSE SERPL-MCNC: 103 MG/DL (ref 70–110)
HCT VFR BLD AUTO: 30.5 % (ref 37–48.5)
HGB BLD-MCNC: 9.4 G/DL (ref 12–16)
IMM GRANULOCYTES # BLD AUTO: 0.04 K/UL (ref 0–0.04)
IMM GRANULOCYTES NFR BLD AUTO: 0.6 % (ref 0–0.5)
LYMPHOCYTES # BLD AUTO: 0.3 K/UL (ref 1–4.8)
LYMPHOCYTES NFR BLD: 4.3 % (ref 18–48)
MAGNESIUM SERPL-MCNC: 2.4 MG/DL (ref 1.6–2.6)
MCH RBC QN AUTO: 27.4 PG (ref 27–31)
MCHC RBC AUTO-ENTMCNC: 30.8 G/DL (ref 32–36)
MCV RBC AUTO: 89 FL (ref 82–98)
MONOCYTES # BLD AUTO: 0.3 K/UL (ref 0.3–1)
MONOCYTES NFR BLD: 4.1 % (ref 4–15)
NEUTROPHILS # BLD AUTO: 6.3 K/UL (ref 1.8–7.7)
NEUTROPHILS NFR BLD: 88.5 % (ref 38–73)
NRBC BLD-RTO: 0 /100 WBC
PLATELET # BLD AUTO: 437 K/UL (ref 150–350)
PMV BLD AUTO: 9.3 FL (ref 9.2–12.9)
POTASSIUM SERPL-SCNC: 3.8 MMOL/L (ref 3.5–5.1)
PROT SERPL-MCNC: 6.5 G/DL (ref 6–8.4)
RBC # BLD AUTO: 3.43 M/UL (ref 4–5.4)
SODIUM SERPL-SCNC: 137 MMOL/L (ref 136–145)
TACROLIMUS BLD-MCNC: 6.5 NG/ML (ref 5–15)
WBC # BLD AUTO: 7.14 K/UL (ref 3.9–12.7)

## 2020-01-09 PROCEDURE — 25000003 PHARM REV CODE 250: Performed by: INTERNAL MEDICINE

## 2020-01-09 PROCEDURE — 93010 EKG 12-LEAD: ICD-10-PCS | Mod: ,,, | Performed by: INTERNAL MEDICINE

## 2020-01-09 PROCEDURE — 83735 ASSAY OF MAGNESIUM: CPT

## 2020-01-09 PROCEDURE — 93010 ELECTROCARDIOGRAM REPORT: CPT | Mod: ,,, | Performed by: INTERNAL MEDICINE

## 2020-01-09 PROCEDURE — 36415 COLL VENOUS BLD VENIPUNCTURE: CPT

## 2020-01-09 PROCEDURE — 80053 COMPREHEN METABOLIC PANEL: CPT

## 2020-01-09 PROCEDURE — 20600001 HC STEP DOWN PRIVATE ROOM

## 2020-01-09 PROCEDURE — 93005 ELECTROCARDIOGRAM TRACING: CPT

## 2020-01-09 PROCEDURE — 25000003 PHARM REV CODE 250: Performed by: STUDENT IN AN ORGANIZED HEALTH CARE EDUCATION/TRAINING PROGRAM

## 2020-01-09 PROCEDURE — 80197 ASSAY OF TACROLIMUS: CPT

## 2020-01-09 PROCEDURE — 63600175 PHARM REV CODE 636 W HCPCS: Performed by: STUDENT IN AN ORGANIZED HEALTH CARE EDUCATION/TRAINING PROGRAM

## 2020-01-09 PROCEDURE — 99232 SBSQ HOSP IP/OBS MODERATE 35: CPT | Mod: ,,, | Performed by: INTERNAL MEDICINE

## 2020-01-09 PROCEDURE — 85025 COMPLETE CBC W/AUTO DIFF WBC: CPT

## 2020-01-09 PROCEDURE — 99232 PR SUBSEQUENT HOSPITAL CARE,LEVL II: ICD-10-PCS | Mod: ,,, | Performed by: INTERNAL MEDICINE

## 2020-01-09 PROCEDURE — 63600175 PHARM REV CODE 636 W HCPCS: Performed by: INTERNAL MEDICINE

## 2020-01-09 PROCEDURE — 97116 GAIT TRAINING THERAPY: CPT

## 2020-01-09 RX ORDER — TACROLIMUS 1 MG/1
1 CAPSULE ORAL ONCE
Status: COMPLETED | OUTPATIENT
Start: 2020-01-09 | End: 2020-01-09

## 2020-01-09 RX ORDER — TACROLIMUS 1 MG/1
3 CAPSULE ORAL EVERY MORNING
Status: DISCONTINUED | OUTPATIENT
Start: 2020-01-10 | End: 2020-01-10 | Stop reason: HOSPADM

## 2020-01-09 RX ORDER — PREDNISONE 10 MG/1
10 TABLET ORAL DAILY
Status: DISCONTINUED | OUTPATIENT
Start: 2020-01-10 | End: 2020-01-10 | Stop reason: HOSPADM

## 2020-01-09 RX ADMIN — VENLAFAXINE HYDROCHLORIDE 150 MG: 37.5 CAPSULE, EXTENDED RELEASE ORAL at 08:01

## 2020-01-09 RX ADMIN — GABAPENTIN 300 MG: 300 CAPSULE ORAL at 08:01

## 2020-01-09 RX ADMIN — OYSTER SHELL CALCIUM WITH VITAMIN D 2 TABLET: 500; 200 TABLET, FILM COATED ORAL at 08:01

## 2020-01-09 RX ADMIN — DOCUSATE SODIUM 50 MG: 50 CAPSULE, LIQUID FILLED ORAL at 02:01

## 2020-01-09 RX ADMIN — NYSTATIN 500000 UNITS: 100000 SUSPENSION ORAL at 08:01

## 2020-01-09 RX ADMIN — MENTHOL, METHYL SALICYLATE: 10; 15 CREAM TOPICAL at 08:01

## 2020-01-09 RX ADMIN — HEPARIN SODIUM 5000 UNITS: 5000 INJECTION, SOLUTION INTRAVENOUS; SUBCUTANEOUS at 08:01

## 2020-01-09 RX ADMIN — Medication 400 MG: at 08:01

## 2020-01-09 RX ADMIN — DOCUSATE SODIUM 50 MG: 50 CAPSULE, LIQUID FILLED ORAL at 08:01

## 2020-01-09 RX ADMIN — VALGANCICLOVIR 450 MG: 450 TABLET, FILM COATED ORAL at 08:01

## 2020-01-09 RX ADMIN — ATORVASTATIN CALCIUM 20 MG: 20 TABLET, FILM COATED ORAL at 08:01

## 2020-01-09 RX ADMIN — HYDRALAZINE HYDROCHLORIDE 50 MG: 50 TABLET, FILM COATED ORAL at 06:01

## 2020-01-09 RX ADMIN — AMLODIPINE BESYLATE 10 MG: 10 TABLET ORAL at 08:01

## 2020-01-09 RX ADMIN — NYSTATIN 500000 UNITS: 100000 SUSPENSION ORAL at 12:01

## 2020-01-09 RX ADMIN — PANTOPRAZOLE SODIUM 40 MG: 40 TABLET, DELAYED RELEASE ORAL at 08:01

## 2020-01-09 RX ADMIN — PREDNISONE 15 MG: 10 TABLET ORAL at 08:01

## 2020-01-09 RX ADMIN — TACROLIMUS 3 MG: 1 CAPSULE ORAL at 05:01

## 2020-01-09 RX ADMIN — SITAGLIPTIN 50 MG: 50 TABLET, FILM COATED ORAL at 08:01

## 2020-01-09 RX ADMIN — POLYETHYLENE GLYCOL 3350 17 G: 17 POWDER, FOR SOLUTION ORAL at 08:01

## 2020-01-09 RX ADMIN — ZOLPIDEM TARTRATE 5 MG: 5 TABLET ORAL at 08:01

## 2020-01-09 RX ADMIN — FUROSEMIDE 40 MG: 40 TABLET ORAL at 08:01

## 2020-01-09 RX ADMIN — MIRTAZAPINE 30 MG: 15 TABLET, FILM COATED ORAL at 08:01

## 2020-01-09 RX ADMIN — TACROLIMUS 2 MG: 1 CAPSULE ORAL at 08:01

## 2020-01-09 RX ADMIN — HYDRALAZINE HYDROCHLORIDE 75 MG: 50 TABLET, FILM COATED ORAL at 08:01

## 2020-01-09 RX ADMIN — NYSTATIN 500000 UNITS: 100000 SUSPENSION ORAL at 05:01

## 2020-01-09 RX ADMIN — FAMOTIDINE 20 MG: 20 TABLET ORAL at 08:01

## 2020-01-09 RX ADMIN — MENTHOL, METHYL SALICYLATE: 10; 15 CREAM TOPICAL at 02:01

## 2020-01-09 RX ADMIN — TACROLIMUS 1 MG: 1 CAPSULE ORAL at 12:01

## 2020-01-09 RX ADMIN — MYCOPHENOLATE MOFETIL 1000 MG: 250 CAPSULE ORAL at 08:01

## 2020-01-09 RX ADMIN — HYDRALAZINE HYDROCHLORIDE 75 MG: 50 TABLET, FILM COATED ORAL at 02:01

## 2020-01-09 RX ADMIN — ASPIRIN 81 MG: 81 TABLET, COATED ORAL at 08:01

## 2020-01-09 NOTE — PT/OT/SLP PROGRESS
Physical Therapy Treatment    Patient Name:  Deborah Navas   MRN:  [id number]    Recommendations:     Discharge Recommendations:  home health PT   Discharge Equipment Recommendations: none   Barriers to discharge: None    Assessment:     Deborah Nvaas is a 49 y.o. female admitted with a medical diagnosis of GLO (acute kidney injury).  She presents with the following impairments/functional limitations:  weakness, impaired endurance, impaired self care skills, impaired functional mobilty, gait instability, impaired balance, impaired cardiopulmonary response to activity . Patient very motivated and will to participate in therapy. Primarily limited by increased shortness of breath. Gait, CGA, 90x2 with standing rest break. Increased shortness of breath and time to recover.    Rehab Prognosis: Good; patient would benefit from acute skilled PT services to address these deficits and reach maximum level of function.    Recent Surgery: Procedure(s) (LRB):  BIOPSY, WITH US GUIDANCE (N/A)  INSERTION, CATHETER, RIGHT HEART (Right) 2 Days Post-Op    Plan:     During this hospitalization, patient to be seen 3 x/week to address the identified rehab impairments via gait training, therapeutic activities, therapeutic exercises, neuromuscular re-education and progress toward the following goals:    · Plan of Care Expires:  02/06/20    Subjective     Chief Complaint: shortness of breath   Patient/Family Comments/goals: Patient and friend report that the had walked further last night (~100' total more), with one standing rest break and patient was less short of breath than she is today.  Pain/Comfort:  · Pain Rating 1: 0/10  · Location - Orientation 1: generalized      Objective:     Communicated with nurse prior to session.  Patient found right sidelying with telemetry, peripheral IV, pulse ox (continuous) upon PT entry to room.     General Precautions: Standard, sternal, neutropenic   Orthopedic Precautions:N/A    Braces: N/A     Functional Mobility:  · Bed Mobility:     · Supine to Sit: independence  · Sit to Supine: independence  · Transfers:  Sit to Stand:  stand by assistance with no AD  · Gait: 90' x 2, CGA, no AD, standing rest break in between, increased time to recover from shortness of breath. Patient educated on Tasha Dyspnea Scale      AM-PAC 6 CLICK MOBILITY  Turning over in bed (including adjusting bedclothes, sheets and blankets)?: 4  Sitting down on and standing up from a chair with arms (e.g., wheelchair, bedside commode, etc.): 3  Moving from lying on back to sitting on the side of the bed?: 4  Moving to and from a bed to a chair (including a wheelchair)?: 3  Need to walk in hospital room?: 3  Climbing 3-5 steps with a railing?: 3  Basic Mobility Total Score: 20       Therapeutic Activities and Exercises:   Pt educated on importance of OOB activity to decrease the risks associated with bed rest. Reviewed sternal precautions as patient was reaching to the side and behind her while sitting in bed. Educated on tasha dyspnea scale. Pt educated on plan and goals with physical therapy.      Patient left HOB elevated with all lines intact, call button in reach and friend present..    GOALS:   Multidisciplinary Problems     Physical Therapy Goals        Problem: Physical Therapy Goal    Goal Priority Disciplines Outcome Goal Variances Interventions   Physical Therapy Goal     PT, PT/OT Ongoing, Progressing     Description:  Goals to be met by: 2020     Patient will increase functional independence with mobility by performin. Supine to sit with Set-up [place]  2. Sit to supine with Set-up [place]  3. Sit to stand transfer with Supervision  4. Bed to chair transfer with Supervision  5. Gait  x 150 feet with Supervision without AD.   6. Lower extremity exercise program x15 reps per handout, with supervision                      Time Tracking:     PT Received On: 20  PT Start Time: 1347     PT  Stop Time: 1359  PT Total Time (min): 12 min     Billable Minutes: Gait Training 12    Treatment Type: Treatment  PT/PTA: PT     PTA Visit Number: 0     Josie Alvarado PT  01/09/2020

## 2020-01-09 NOTE — CONSULTS
Pt has been seen and examined. Right groin fluid collection noted but no signs of infection or drainage. Incision healing well CDI. No surgical intervention at this time. Discussed with Dr. Walden.      [facility]  CTS

## 2020-01-09 NOTE — PROGRESS NOTES
[facility]  Heart Transplant  Progress Note    Patient Name: Deborah Navas  MRN: [id number]  Admission Date: 1/2/2020  Hospital Length of Stay: 7 days  Attending Physician: Dr. Lua  Primary Care Provider: Valeria Verma MD  Principal Problem:GLO (acute kidney injury)    Subjective:     Interval events:  Patient was seen and examined this morning at bedside. No acute overnight events. Today she reports some improvement of shoulder pain and the tremors/ stiffness. BP overnight elevated up to 140/70s despite increasing hydralazine dose (50mg q8h).    Past Medical History:   Diagnosis Date    Fractures     History of ventricular fibrillation 9/4/2019    History of ventricular tachycardia 9/4/2019    Hyperlipidemia     Migraine     Osteoarthritis        Past Surgical History:   Procedure Laterality Date    BACK SURGERY      2007    BIOPSY WITH ULTRASOUND GUIDANCE N/A 12/31/2019    Procedure: BIOPSY, WITH US GUIDANCE;  Surgeon: Eric Antunez Jr., MD;  Location: [facility] CATH LAB;  Service: Cardiology;  Laterality: N/A;    BIOPSY WITH ULTRASOUND GUIDANCE N/A 1/7/2020    Procedure: BIOPSY, WITH US GUIDANCE;  Surgeon: Renetta Chaudhari MD;  Location: [facility] CATH LAB;  Service: Cardiology;  Laterality: N/A;    BONE GRAFT Left     from Left hip to Left FA    eardrum reconstruction  1980    ELBOW SURGERY Left [id number]    FOREARM FRACTURE SURGERY Bilateral [id number]    multiple surgeries    HEART TRANSPLANT N/A 12/16/2019    Procedure: TRANSPLANT, HEART;  Surgeon: Talha Nuñez MD;  Location: [facility] OR [address] [place] [address];  Service: Cardiothoracic;  Laterality: N/A;    INSERTION OF GRAFT TO PERICARDIUM  9/10/2019    Procedure: INSERTION, GRAFT, PERICARDIUM;  Surgeon: Shar Walden MD;  Location: [facility] OR [address] [place] [address];  Service: Cardiovascular;;    INSERTION OF IMPLANTABLE CARDIOVERTER-DEFIBRILLATOR (ICD) GENERATOR WITH TWO EXISTING LEADS      INSERTION OF PACEMAKER Left     LEFT VENTRICULAR  ASSIST DEVICE N/A 9/10/2019    Procedure: INSERTION-LEFT VENTRICULAR ASSIST DEVICE;  Surgeon: Shar Walden MD;  Location: [facility] OR [facility] FLR;  Service: Cardiovascular;  Laterality: N/A;  DT HM3     LUMBAR FUSION  2007    L4-L5    RIGHT HEART CATHETERIZATION Right 9/4/2019    Procedure: INSERTION, CATHETER, RIGHT HEART;  Surgeon: Vi Bryant MD;  Location: [facility] CATH LAB;  Service: Cardiology;  Laterality: Right;    RIGHT HEART CATHETERIZATION N/A 11/18/2019    Procedure: INSERTION, CATHETER, RIGHT HEART;  Surgeon: Eric Antunez Jr., MD;  Location: [facility] CATH LAB;  Service: Cardiology;  Laterality: N/A;    RIGHT HEART CATHETERIZATION Right 12/31/2019    Procedure: INSERTION, CATHETER, RIGHT HEART;  Surgeon: Eric Antunez Jr., MD;  Location: [facility] CATH LAB;  Service: Cardiology;  Laterality: Right;    RIGHT HEART CATHETERIZATION Right 1/7/2020    Procedure: INSERTION, CATHETER, RIGHT HEART;  Surgeon: Renetta Chaudhari MD;  Location: [facility] CATH LAB;  Service: Cardiology;  Laterality: Right;    SINUS SURGERY Right 1994    with lymph nodes    STERNAL WOUND CLOSURE  9/10/2019    Procedure: CLOSURE, WOUND, STERNUM;  Surgeon: Shar Walden MD;  [address]  Service: Cardiovascular;;    TEMPOROMANDIBULAR JOINT SURGERY Right 1988    TONSILLECTOMY      TREATMENT OF CARDIAC ARRHYTHMIA N/A 9/24/2019    Procedure: CARDIOVERSION;  Surgeon: Moe Barrera MD;  Location: [facility] EP LAB;  Service: Cardiology;  Laterality: N/A;  AF, DCCV/NADINE, anes, DM, Rm 3073    TYMPANOSTOMY TUBE PLACEMENT  1971- 1979    multiple tube placements       Review of patient's allergies indicates:   Allergen Reactions    Adhesive Blisters     Reaction to area in chest and up only    Codeine Itching       Current Facility-Administered Medications   Medication    acetaminophen tablet 650 mg    amLODIPine tablet 10 mg    aspirin EC tablet 81 mg    atorvastatin tablet 20 mg    calcium carbonate 200 mg calcium (500 mg)  chewable tablet 500 mg    calcium-vitamin D3 500 mg(1,250mg) -200 unit per tablet 2 tablet    cyclobenzaprine tablet 5 mg    docusate sodium capsule 50 mg    famotidine tablet 20 mg    furosemide tablet 40 mg    gabapentin capsule 300 mg    heparin (porcine) injection 5,000 Units    hydrALAZINE tablet 75 mg    magnesium oxide tablet 400 mg    methyl salicylate-menthol 15-10% cream    mirtazapine tablet 30 mg    mycophenolate capsule 1,000 mg    nystatin 100,000 unit/mL suspension 500,000 Units    oxyCODONE immediate release tablet Tab 10 mg    pantoprazole EC tablet 40 mg    polyethylene glycol packet 17 g    predniSONE tablet 15 mg    promethazine tablet 25 mg    senna-docusate 8.6-50 mg per tablet 2 tablet    SITagliptin tablet 50 mg    sodium chloride 0.9% flush 10 mL    sulfamethoxazole-trimethoprim 400-80mg per tablet 1 tablet    tacrolimus capsule 2 mg    tacrolimus capsule 3 mg    valGANciclovir tablet 450 mg    venlafaxine 24 hr capsule 150 mg    zolpidem tablet 5 mg     Family History     Problem Relation (Age of Onset)    Broken bones Maternal Grandmother    Cancer Paternal Grandmother    Dislocations Maternal Grandmother    Heart failure Paternal Grandfather, Maternal Grandfather    Migraines Mother, Maternal Grandmother, Paternal Grandmother, Paternal Grandfather, Maternal Grandfather    Obesity Paternal Grandmother    Osteoarthritis Mother, Maternal Grandmother, Paternal Grandmother, Paternal Grandfather, Maternal Grandfather, Father    Osteoporosis Maternal Grandmother    Scoliosis Maternal Grandmother    Stroke Father        Tobacco Use    Smoking status: Never Smoker    Smokeless tobacco: Never Used   Substance and Sexual Activity    Alcohol use: No    Drug use: No    Sexual activity: Not Currently     Review of Systems   All other systems reviewed and are negative.    Objective:     Vital Signs (Most Recent):  Temp: 98.8 °F (37.1 °C) (01/08/20 2000)  Pulse: 86  (01/09/20 0731)  Resp: 15 (01/09/20 0500)  BP: 126/78 (01/09/20 0500)  SpO2: (!) 93 % (01/09/20 0500) Vital Signs (24h Range):  Temp:  [98 °F (36.7 °C)-98.8 °F (37.1 °C)] 98.8 °F (37.1 °C)  Pulse:  [83-96] 86  Resp:  [15-21] 15  SpO2:  [91 %-98 %] 93 %  BP: (122-129)/(76-82) 126/78     Patient Vitals for the past 72 hrs (Last 3 readings):   Weight   01/09/20 0400 106.1 kg (233 lb 14.5 oz)   01/07/20 0745 105.2 kg (232 lb)     Body mass index is 36.64 kg/m².      Intake/Output Summary (Last 24 hours) at 1/9/2020 0755  Last data filed at 1/9/2020 0400  Gross per 24 hour   Intake 1000 ml   Output 1650 ml   Net -650 ml       Physical Exam   Constitutional: She appears well-developed and well-nourished. No distress.   HENT:   Head: Normocephalic.   Eyes: Pupils are equal, round, and reactive to light. EOM are normal.   Neck:   Thick neck, unable to appreciate JVP, prominent EJ   Cardiovascular: Normal rate, regular rhythm and normal heart sounds.   No murmur heard.  Chest with midline surgical wound closed, no erythema or discharge.    Pulmonary/Chest: Effort normal and breath sounds normal. No respiratory distress.   Abdominal: Soft. She exhibits no distension. There is no tenderness.   Musculoskeletal:   Trace LE edema. R groin with clean closed wound, no discharge or erythema. Medium-size soft mass palpated, as well as a smaller mass distally, minimally tender   Skin: Skin is warm.       Significant Labs:  CBC:  Recent Labs   Lab 01/06/20  0430 01/07/20  0530 01/08/20  0620   WBC 6.39 6.17 5.93   RBC 2.86* 2.97* 3.02*   HGB 7.6* 7.8* 8.1*   HCT 24.7* 25.9* 27.0*    348 374*   MCV 86 87 89   MCH 26.6* 26.3* 26.8*   MCHC 30.8* 30.1* 30.0*     BNP:  Recent Labs   Lab 01/02/20  0835   *     CMP:  Recent Labs   Lab 01/06/20  0430 01/07/20  0530 01/08/20  0620   * 107 99   CALCIUM 8.8 8.9 9.1   ALBUMIN 2.8* 2.6* 2.8*   PROT 5.7* 5.7* 5.9*    140 139   K 3.5 3.1* 3.7   CO2 30* 31* 29   CL 95 97 98    BUN 38* 33* 32*   CREATININE 3.2* 2.7* 2.8*   ALKPHOS 90 87 86   ALT 9* 10 9*   AST 9* 8* 9*   BILITOT 0.5 0.4 0.4      Coagulation:   No results for input(s): PT, INR, APTT in the last 168 hours.  LDH:  No results for input(s): LDH in the last 72 hours.  Microbiology:  Microbiology Results (last 7 days)     ** No results found for the last 168 hours. **          I have reviewed all pertinent labs within the past 24 hours.        Assessment and Plan:     50yo F s/p OHTx on 12/16/2019 for NICM/ HFrEF with HM3 on 9/2019, removed at time of transplant, as well as AFl post LVAD s/p NADINE/DCCV, VT/VF (some a/w hypokalemia), HLD, obesity, and OA, who presented for worsening LEGER and GLO. RHC on 12/31 with elevated filling pressures (RA 17, PCWP 15). Pathology of native heart revealed non-caseating granulomas typical of sarcoidosis. Presentation  was likely c/w cardiorenal syndrome in the setting of ADHF/ fluid overload plus side effect from tacrolimus. Started on IV diuretic with good response at this time. EMB on 12/31 and 1/7 without evidence of rejection. Repeat RHC on 1/7 with upper normal filling pressures (RA 12, wedge 15) and high CO/CI. Currently with mild improvement of renal function, BP above  goal.        Acute on chronic diastolic heart failure- improving     S/p OHTx on 12/16 for NICM, path revealed sarcoidosis. Initially mildly hypervolemic, NYHA III  - Continue furosemide 40mg PO daily  - Monitor electrolytes and replete for target K>4 and Mg>2  - TTE revealed LVEF 65% and normal RV function, mild MR, mod-sev TR, IVC not seen; no pericardial effusion appreciated  - RHC 1/7 with upper normal R and L filling pressures (RA 12, wedge 15) and elevated CO/CI (8.3/3.8)     Status post heart transplant  - Tacrolimus level slightly elevated on admission, today 6.5, will increase to 3mg BID (goal ~8)      - BP elevated, continue amlodipine 10mg daily; will increase hydralazine to 75mg PO q8h  - Theophylline levels  elevated--> discontinued  - Continue mycophenolate 1000mg BID  - Continue prednisone 15mg daily  - Continue TMP/SMX and valganciclovir  - EMB:          12/31: negative for Ab or cell-mediated rejection          1/7: negative for Ab or cell-mediated rejection  - Path of native heart revealed sarcoidosis, will need close f/u and possible chest/ extracardiac imaging     * GLO (acute kidney injury)- improving  Likely cardiorenal syndrome with renal venous congestion  - Diuresis as above  - Monitor renal function and electrolytes  - Renally dose meds  - Avoid nephrotoxins  - UA WNL     * Normocytic anemia  - Iron panel c/w LYN  - Continue ferrous sulfate  - Monitor Hb      * R groin fluid collections- likely hematomas      - Repeat R groin US on 1/2 shows decreased size of fluid collection, Repeat on 1/7 with interval increased size (11.5 x 0.2 x 7.8; from 7.4 x 3.9 x 2) and new 2nd collection      - CTS aware      - Continue monitoring     * Chronic pain      - Continue gabapentin      - Continue Tylenol and oxycodone PRN     *Shoulder pain- unclear etiology, likely MSK- improving      -Continue analgesic PRN      - Continue Flexeril and topical NSAID     * Miscellaneous      - Diet: Cardiac      - DVT ppx: Heparin SQ      - Code status: Full code       Triny Yusuf MD  Heart Transplant  [facility]

## 2020-01-09 NOTE — NURSING
Spoke to pt and caregiver,Flavia, in the hospital room. She was sitting on side of bed and was alert and engaging. She said she did not sleep last night so that is why she was sleeping. We discussed her kidney issues and that maybe we would switch her to Sirolimus when her wound issues are done. Discussed daily plan for exercise at the apartments. Encouraged her to use spirometry for lungs.

## 2020-01-09 NOTE — PLAN OF CARE
Concerns noted regarding patient sleeping and in bed during waking hours.    Patient is being followed by PT/OT--ROSIE recs currently, reached out to PT to discuss --awaiting call back    Spoke with nursing and ordered for patient to be OOB during waking hours and meals if possible.     Also to assist patient with ambulation 3x's daily     No pain meds or muscle relaxers administered since 1/7  Patient does take ambien nightly --may need to rethink if patient remains tired throughout the dayshift

## 2020-01-09 NOTE — H&P (VIEW-ONLY)
[facility]  Heart Transplant  Progress Note    Patient Name: Deborah Navas  MRN: [id number]  Admission Date: 1/2/2020  Hospital Length of Stay: 7 days  Attending Physician: Dr. Lua  Primary Care Provider: Valeria Verma MD  Principal Problem:GLO (acute kidney injury)    Subjective:     Interval events:  Patient was seen and examined this morning at bedside. No acute overnight events. Today she reports some improvement of shoulder pain and the tremors/ stiffness. BP overnight elevated up to 140/70s despite increasing hydralazine dose (50mg q8h).    Past Medical History:   Diagnosis Date    Fractures     History of ventricular fibrillation 9/4/2019    History of ventricular tachycardia 9/4/2019    Hyperlipidemia     Migraine     Osteoarthritis        Past Surgical History:   Procedure Laterality Date    BACK SURGERY      2007    BIOPSY WITH ULTRASOUND GUIDANCE N/A 12/31/2019    Procedure: BIOPSY, WITH US GUIDANCE;  Surgeon: Eric Antunez Jr., MD;  Location: [facility] CATH LAB;  Service: Cardiology;  Laterality: N/A;    BIOPSY WITH ULTRASOUND GUIDANCE N/A 1/7/2020    Procedure: BIOPSY, WITH US GUIDANCE;  Surgeon: Renetta Chaudhari MD;  Location: [facility] CATH LAB;  Service: Cardiology;  Laterality: N/A;    BONE GRAFT Left     from Left hip to Left FA    eardrum reconstruction  1980    ELBOW SURGERY Left [id number]    FOREARM FRACTURE SURGERY Bilateral [id number]    multiple surgeries    HEART TRANSPLANT N/A 12/16/2019    Procedure: TRANSPLANT, HEART;  Surgeon: Talha Nuñez MD;  Location: [facility] OR [address] [place] [address];  Service: Cardiothoracic;  Laterality: N/A;    INSERTION OF GRAFT TO PERICARDIUM  9/10/2019    Procedure: INSERTION, GRAFT, PERICARDIUM;  Surgeon: Shar Walden MD;  Location: [facility] OR [address] [place] [address];  Service: Cardiovascular;;    INSERTION OF IMPLANTABLE CARDIOVERTER-DEFIBRILLATOR (ICD) GENERATOR WITH TWO EXISTING LEADS      INSERTION OF PACEMAKER Left     LEFT VENTRICULAR  ASSIST DEVICE N/A 9/10/2019    Procedure: INSERTION-LEFT VENTRICULAR ASSIST DEVICE;  Surgeon: Shar Walden MD;  Location: [facility] OR [facility] FLR;  Service: Cardiovascular;  Laterality: N/A;  DT HM3     LUMBAR FUSION  2007    L4-L5    RIGHT HEART CATHETERIZATION Right 9/4/2019    Procedure: INSERTION, CATHETER, RIGHT HEART;  Surgeon: Vi Bryant MD;  Location: [facility] CATH LAB;  Service: Cardiology;  Laterality: Right;    RIGHT HEART CATHETERIZATION N/A 11/18/2019    Procedure: INSERTION, CATHETER, RIGHT HEART;  Surgeon: Eric Antunez Jr., MD;  Location: [facility] CATH LAB;  Service: Cardiology;  Laterality: N/A;    RIGHT HEART CATHETERIZATION Right 12/31/2019    Procedure: INSERTION, CATHETER, RIGHT HEART;  Surgeon: Eric Antunez Jr., MD;  Location: [facility] CATH LAB;  Service: Cardiology;  Laterality: Right;    RIGHT HEART CATHETERIZATION Right 1/7/2020    Procedure: INSERTION, CATHETER, RIGHT HEART;  Surgeon: Renetta Chaudhari MD;  Location: [facility] CATH LAB;  Service: Cardiology;  Laterality: Right;    SINUS SURGERY Right 1994    with lymph nodes    STERNAL WOUND CLOSURE  9/10/2019    Procedure: CLOSURE, WOUND, STERNUM;  Surgeon: Shar Walden MD;  [address]  Service: Cardiovascular;;    TEMPOROMANDIBULAR JOINT SURGERY Right 1988    TONSILLECTOMY      TREATMENT OF CARDIAC ARRHYTHMIA N/A 9/24/2019    Procedure: CARDIOVERSION;  Surgeon: Moe Barrera MD;  Location: [facility] EP LAB;  Service: Cardiology;  Laterality: N/A;  AF, DCCV/NADINE, anes, DM, Rm 3073    TYMPANOSTOMY TUBE PLACEMENT  1971- 1979    multiple tube placements       Review of patient's allergies indicates:   Allergen Reactions    Adhesive Blisters     Reaction to area in chest and up only    Codeine Itching       Current Facility-Administered Medications   Medication    acetaminophen tablet 650 mg    amLODIPine tablet 10 mg    aspirin EC tablet 81 mg    atorvastatin tablet 20 mg    calcium carbonate 200 mg calcium (500 mg)  chewable tablet 500 mg    calcium-vitamin D3 500 mg(1,250mg) -200 unit per tablet 2 tablet    cyclobenzaprine tablet 5 mg    docusate sodium capsule 50 mg    famotidine tablet 20 mg    furosemide tablet 40 mg    gabapentin capsule 300 mg    heparin (porcine) injection 5,000 Units    hydrALAZINE tablet 75 mg    magnesium oxide tablet 400 mg    methyl salicylate-menthol 15-10% cream    mirtazapine tablet 30 mg    mycophenolate capsule 1,000 mg    nystatin 100,000 unit/mL suspension 500,000 Units    oxyCODONE immediate release tablet Tab 10 mg    pantoprazole EC tablet 40 mg    polyethylene glycol packet 17 g    predniSONE tablet 15 mg    promethazine tablet 25 mg    senna-docusate 8.6-50 mg per tablet 2 tablet    SITagliptin tablet 50 mg    sodium chloride 0.9% flush 10 mL    sulfamethoxazole-trimethoprim 400-80mg per tablet 1 tablet    tacrolimus capsule 2 mg    tacrolimus capsule 3 mg    valGANciclovir tablet 450 mg    venlafaxine 24 hr capsule 150 mg    zolpidem tablet 5 mg     Family History     Problem Relation (Age of Onset)    Broken bones Maternal Grandmother    Cancer Paternal Grandmother    Dislocations Maternal Grandmother    Heart failure Paternal Grandfather, Maternal Grandfather    Migraines Mother, Maternal Grandmother, Paternal Grandmother, Paternal Grandfather, Maternal Grandfather    Obesity Paternal Grandmother    Osteoarthritis Mother, Maternal Grandmother, Paternal Grandmother, Paternal Grandfather, Maternal Grandfather, Father    Osteoporosis Maternal Grandmother    Scoliosis Maternal Grandmother    Stroke Father        Tobacco Use    Smoking status: Never Smoker    Smokeless tobacco: Never Used   Substance and Sexual Activity    Alcohol use: No    Drug use: No    Sexual activity: Not Currently     Review of Systems   All other systems reviewed and are negative.    Objective:     Vital Signs (Most Recent):  Temp: 98.8 °F (37.1 °C) (01/08/20 2000)  Pulse: 86  (01/09/20 0731)  Resp: 15 (01/09/20 0500)  BP: 126/78 (01/09/20 0500)  SpO2: (!) 93 % (01/09/20 0500) Vital Signs (24h Range):  Temp:  [98 °F (36.7 °C)-98.8 °F (37.1 °C)] 98.8 °F (37.1 °C)  Pulse:  [83-96] 86  Resp:  [15-21] 15  SpO2:  [91 %-98 %] 93 %  BP: (122-129)/(76-82) 126/78     Patient Vitals for the past 72 hrs (Last 3 readings):   Weight   01/09/20 0400 106.1 kg (233 lb 14.5 oz)   01/07/20 0745 105.2 kg (232 lb)     Body mass index is 36.64 kg/m².      Intake/Output Summary (Last 24 hours) at 1/9/2020 0755  Last data filed at 1/9/2020 0400  Gross per 24 hour   Intake 1000 ml   Output 1650 ml   Net -650 ml       Physical Exam   Constitutional: She appears well-developed and well-nourished. No distress.   HENT:   Head: Normocephalic.   Eyes: Pupils are equal, round, and reactive to light. EOM are normal.   Neck:   Thick neck, unable to appreciate JVP, prominent EJ   Cardiovascular: Normal rate, regular rhythm and normal heart sounds.   No murmur heard.  Chest with midline surgical wound closed, no erythema or discharge.    Pulmonary/Chest: Effort normal and breath sounds normal. No respiratory distress.   Abdominal: Soft. She exhibits no distension. There is no tenderness.   Musculoskeletal:   Trace LE edema. R groin with clean closed wound, no discharge or erythema. Medium-size soft mass palpated, as well as a smaller mass distally, minimally tender   Skin: Skin is warm.       Significant Labs:  CBC:  Recent Labs   Lab 01/06/20  0430 01/07/20  0530 01/08/20  0620   WBC 6.39 6.17 5.93   RBC 2.86* 2.97* 3.02*   HGB 7.6* 7.8* 8.1*   HCT 24.7* 25.9* 27.0*    348 374*   MCV 86 87 89   MCH 26.6* 26.3* 26.8*   MCHC 30.8* 30.1* 30.0*     BNP:  Recent Labs   Lab 01/02/20  0835   *     CMP:  Recent Labs   Lab 01/06/20  0430 01/07/20  0530 01/08/20  0620   * 107 99   CALCIUM 8.8 8.9 9.1   ALBUMIN 2.8* 2.6* 2.8*   PROT 5.7* 5.7* 5.9*    140 139   K 3.5 3.1* 3.7   CO2 30* 31* 29   CL 95 97 98    BUN 38* 33* 32*   CREATININE 3.2* 2.7* 2.8*   ALKPHOS 90 87 86   ALT 9* 10 9*   AST 9* 8* 9*   BILITOT 0.5 0.4 0.4      Coagulation:   No results for input(s): PT, INR, APTT in the last 168 hours.  LDH:  No results for input(s): LDH in the last 72 hours.  Microbiology:  Microbiology Results (last 7 days)     ** No results found for the last 168 hours. **          I have reviewed all pertinent labs within the past 24 hours.        Assessment and Plan:     50yo F s/p OHTx on 12/16/2019 for NICM/ HFrEF with HM3 on 9/2019, removed at time of transplant, as well as AFl post LVAD s/p NADINE/DCCV, VT/VF (some a/w hypokalemia), HLD, obesity, and OA, who presented for worsening LEGER and GLO. RHC on 12/31 with elevated filling pressures (RA 17, PCWP 15). Pathology of native heart revealed non-caseating granulomas typical of sarcoidosis. Presentation  was likely c/w cardiorenal syndrome in the setting of ADHF/ fluid overload plus side effect from tacrolimus. Started on IV diuretic with good response at this time. EMB on 12/31 and 1/7 without evidence of rejection. Repeat RHC on 1/7 with upper normal filling pressures (RA 12, wedge 15) and high CO/CI. Currently with mild improvement of renal function, BP above  goal.        Acute on chronic diastolic heart failure- improving     S/p OHTx on 12/16 for NICM, path revealed sarcoidosis. Initially mildly hypervolemic, NYHA III  - Continue furosemide 40mg PO daily  - Monitor electrolytes and replete for target K>4 and Mg>2  - TTE revealed LVEF 65% and normal RV function, mild MR, mod-sev TR, IVC not seen; no pericardial effusion appreciated  - RHC 1/7 with upper normal R and L filling pressures (RA 12, wedge 15) and elevated CO/CI (8.3/3.8)     Status post heart transplant  - Tacrolimus level slightly elevated on admission, today 6.5, will increase to 3mg BID (goal ~8)      - BP elevated, continue amlodipine 10mg daily; will increase hydralazine to 75mg PO q8h  - Theophylline levels  elevated--> discontinued  - Continue mycophenolate 1000mg BID  - Continue prednisone 15mg daily  - Continue TMP/SMX and valganciclovir  - EMB:          12/31: negative for Ab or cell-mediated rejection          1/7: negative for Ab or cell-mediated rejection  - Path of native heart revealed sarcoidosis, will need close f/u and possible chest/ extracardiac imaging     * GLO (acute kidney injury)- improving  Likely cardiorenal syndrome with renal venous congestion  - Diuresis as above  - Monitor renal function and electrolytes  - Renally dose meds  - Avoid nephrotoxins  - UA WNL     * Normocytic anemia  - Iron panel c/w LYN  - Continue ferrous sulfate  - Monitor Hb      * R groin fluid collections- likely hematomas      - Repeat R groin US on 1/2 shows decreased size of fluid collection, Repeat on 1/7 with interval increased size (11.5 x 0.2 x 7.8; from 7.4 x 3.9 x 2) and new 2nd collection      - CTS aware      - Continue monitoring     * Chronic pain      - Continue gabapentin      - Continue Tylenol and oxycodone PRN     *Shoulder pain- unclear etiology, likely MSK- improving      -Continue analgesic PRN      - Continue Flexeril and topical NSAID     * Miscellaneous      - Diet: Cardiac      - DVT ppx: Heparin SQ      - Code status: Full code       Triny Yusuf MD  Heart Transplant  [facility]

## 2020-01-09 NOTE — PLAN OF CARE
Patient is AAOx3, independent. Patient denies any pain or nausea. Patient slept all morning but has been awake all afternoon with her friend at the bedside. Encouraging the patient to sit up in the chair for meals and walk in the hansen. Patient did walk in the halls with PT today. Patient did not sit in the chair but did sit up on side of the bed to play cards with her friend. Monitoring the patient's blood sugar AC&HS. Patient remains NSR on telemetry. Reminded the patient to call for assistance. Call light and personal items are within reach.

## 2020-01-09 NOTE — SUBJECTIVE & OBJECTIVE
Interval events:  Patient was seen and examined this morning at bedside. No acute overnight events. Today she reports some improvement of shoulder pain and the tremors/ stiffness. BP overnight elevated up to 140/70s despite increasing hydralazine dose (50mg q8h).    Past Medical History:   Diagnosis Date    Fractures     History of ventricular fibrillation 9/4/2019    History of ventricular tachycardia 9/4/2019    Hyperlipidemia     Migraine     Osteoarthritis        Past Surgical History:   Procedure Laterality Date    BACK SURGERY      2007    BIOPSY WITH ULTRASOUND GUIDANCE N/A 12/31/2019    Procedure: BIOPSY, WITH US GUIDANCE;  Surgeon: Eric Antunez Jr., MD;  Location: [facility] CATH LAB;  Service: Cardiology;  Laterality: N/A;    BIOPSY WITH ULTRASOUND GUIDANCE N/A 1/7/2020    Procedure: BIOPSY, WITH US GUIDANCE;  Surgeon: Renetta Chaudhari MD;  Location: [facility] CATH LAB;  Service: Cardiology;  Laterality: N/A;    BONE GRAFT Left     from Left hip to Left FA    eardrum reconstruction  1980    ELBOW SURGERY Left [id number]    FOREARM FRACTURE SURGERY Bilateral [id number]    multiple surgeries    HEART TRANSPLANT N/A 12/16/2019    Procedure: TRANSPLANT, HEART;  Surgeon: Talha Nuñez MD;  Location: [facility] OR [address] [place] [address];  Service: Cardiothoracic;  Laterality: N/A;    INSERTION OF GRAFT TO PERICARDIUM  9/10/2019    Procedure: INSERTION, GRAFT, PERICARDIUM;  Surgeon: Shar Walden MD;  Location: [facility] OR [address] [place] [address];  Service: Cardiovascular;;    INSERTION OF IMPLANTABLE CARDIOVERTER-DEFIBRILLATOR (ICD) GENERATOR WITH TWO EXISTING LEADS      INSERTION OF PACEMAKER Left     LEFT VENTRICULAR ASSIST DEVICE N/A 9/10/2019    Procedure: INSERTION-LEFT VENTRICULAR ASSIST DEVICE;  Surgeon: Shar Walden MD;  Location: [facility] OR [address] [place] [address];  Service: Cardiovascular;  Laterality: N/A;  DT HM3     LUMBAR FUSION  2007    L4-L5    RIGHT HEART CATHETERIZATION Right 9/4/2019    Procedure: INSERTION, CATHETER, RIGHT HEART;   Surgeon: Vi Bryant MD;  Location: [facility] CATH LAB;  Service: Cardiology;  Laterality: Right;    RIGHT HEART CATHETERIZATION N/A 11/18/2019    Procedure: INSERTION, CATHETER, RIGHT HEART;  Surgeon: Eric Antunez Jr., MD;  Location: [facility] CATH LAB;  Service: Cardiology;  Laterality: N/A;    RIGHT HEART CATHETERIZATION Right 12/31/2019    Procedure: INSERTION, CATHETER, RIGHT HEART;  Surgeon: Eric Antunez Jr., MD;  Location: [facility] CATH LAB;  Service: Cardiology;  Laterality: Right;    RIGHT HEART CATHETERIZATION Right 1/7/2020    Procedure: INSERTION, CATHETER, RIGHT HEART;  Surgeon: Renetta Chaudhari MD;  Location: [facility] CATH LAB;  Service: Cardiology;  Laterality: Right;    SINUS SURGERY Right 1994    with lymph nodes    STERNAL WOUND CLOSURE  9/10/2019    Procedure: CLOSURE, WOUND, STERNUM;  Surgeon: Shar Walden MD;  Location: [facility] OR [address] [place] [address];  Service: Cardiovascular;;    TEMPOROMANDIBULAR JOINT SURGERY Right 1988    TONSILLECTOMY      TREATMENT OF CARDIAC ARRHYTHMIA N/A 9/24/2019    Procedure: CARDIOVERSION;  Surgeon: Moe Barrera MD;  Location: [facility] EP LAB;  Service: Cardiology;  Laterality: N/A;  AF, DCCV/NADINE, anes, DM, Rm 3073    TYMPANOSTOMY TUBE PLACEMENT  1971- 1979    multiple tube placements       Review of patient's allergies indicates:   Allergen Reactions    Adhesive Blisters     Reaction to area in chest and up only    Codeine Itching       Current Facility-Administered Medications   Medication    acetaminophen tablet 650 mg    amLODIPine tablet 10 mg    aspirin EC tablet 81 mg    atorvastatin tablet 20 mg    calcium carbonate 200 mg calcium (500 mg) chewable tablet 500 mg    calcium-vitamin D3 500 mg(1,250mg) -200 unit per tablet 2 tablet    cyclobenzaprine tablet 5 mg    docusate sodium capsule 50 mg    famotidine tablet 20 mg    furosemide tablet 40 mg    gabapentin capsule 300 mg    heparin (porcine) injection 5,000 Units    hydrALAZINE tablet 75 mg     magnesium oxide tablet 400 mg    methyl salicylate-menthol 15-10% cream    mirtazapine tablet 30 mg    mycophenolate capsule 1,000 mg    nystatin 100,000 unit/mL suspension 500,000 Units    oxyCODONE immediate release tablet Tab 10 mg    pantoprazole EC tablet 40 mg    polyethylene glycol packet 17 g    predniSONE tablet 15 mg    promethazine tablet 25 mg    senna-docusate 8.6-50 mg per tablet 2 tablet    SITagliptin tablet 50 mg    sodium chloride 0.9% flush 10 mL    sulfamethoxazole-trimethoprim 400-80mg per tablet 1 tablet    tacrolimus capsule 2 mg    tacrolimus capsule 3 mg    valGANciclovir tablet 450 mg    venlafaxine 24 hr capsule 150 mg    zolpidem tablet 5 mg     Family History     Problem Relation (Age of Onset)    Broken bones Maternal Grandmother    Cancer Paternal Grandmother    Dislocations Maternal Grandmother    Heart failure Paternal Grandfather, Maternal Grandfather    Migraines Mother, Maternal Grandmother, Paternal Grandmother, Paternal Grandfather, Maternal Grandfather    Obesity Paternal Grandmother    Osteoarthritis Mother, Maternal Grandmother, Paternal Grandmother, Paternal Grandfather, Maternal Grandfather, Father    Osteoporosis Maternal Grandmother    Scoliosis Maternal Grandmother    Stroke Father        Tobacco Use    Smoking status: Never Smoker    Smokeless tobacco: Never Used   Substance and Sexual Activity    Alcohol use: No    Drug use: No    Sexual activity: Not Currently     Review of Systems   All other systems reviewed and are negative.    Objective:     Vital Signs (Most Recent):  Temp: 98.8 °F (37.1 °C) (01/08/20 2000)  Pulse: 86 (01/09/20 0731)  Resp: 15 (01/09/20 0500)  BP: 126/78 (01/09/20 0500)  SpO2: (!) 93 % (01/09/20 0500) Vital Signs (24h Range):  Temp:  [98 °F (36.7 °C)-98.8 °F (37.1 °C)] 98.8 °F (37.1 °C)  Pulse:  [83-96] 86  Resp:  [15-21] 15  SpO2:  [91 %-98 %] 93 %  BP: (122-129)/(76-82) 126/78     Patient Vitals for the past 72 hrs (Last  3 readings):   Weight   01/09/20 0400 106.1 kg (233 lb 14.5 oz)   01/07/20 0745 105.2 kg (232 lb)     Body mass index is 36.64 kg/m².      Intake/Output Summary (Last 24 hours) at 1/9/2020 0755  Last data filed at 1/9/2020 0400  Gross per 24 hour   Intake 1000 ml   Output 1650 ml   Net -650 ml       Physical Exam   Constitutional: She appears well-developed and well-nourished. No distress.   HENT:   Head: Normocephalic.   Eyes: Pupils are equal, round, and reactive to light. EOM are normal.   Neck:   Thick neck, unable to appreciate JVP, prominent EJ   Cardiovascular: Normal rate, regular rhythm and normal heart sounds.   No murmur heard.  Chest with midline surgical wound closed, no erythema or discharge.    Pulmonary/Chest: Effort normal and breath sounds normal. No respiratory distress.   Abdominal: Soft. She exhibits no distension. There is no tenderness.   Musculoskeletal:   Trace LE edema. R groin with clean closed wound, no discharge or erythema. Medium-size soft mass palpated, as well as a smaller mass distally, minimally tender   Skin: Skin is warm.       Significant Labs:  CBC:  Recent Labs   Lab 01/06/20  0430 01/07/20  0530 01/08/20  0620   WBC 6.39 6.17 5.93   RBC 2.86* 2.97* 3.02*   HGB 7.6* 7.8* 8.1*   HCT 24.7* 25.9* 27.0*    348 374*   MCV 86 87 89   MCH 26.6* 26.3* 26.8*   MCHC 30.8* 30.1* 30.0*     BNP:  Recent Labs   Lab 01/02/20  0835   *     CMP:  Recent Labs   Lab 01/06/20  0430 01/07/20  0530 01/08/20  0620   * 107 99   CALCIUM 8.8 8.9 9.1   ALBUMIN 2.8* 2.6* 2.8*   PROT 5.7* 5.7* 5.9*    140 139   K 3.5 3.1* 3.7   CO2 30* 31* 29   CL 95 97 98   BUN 38* 33* 32*   CREATININE 3.2* 2.7* 2.8*   ALKPHOS 90 87 86   ALT 9* 10 9*   AST 9* 8* 9*   BILITOT 0.5 0.4 0.4      Coagulation:   No results for input(s): PT, INR, APTT in the last 168 hours.  LDH:  No results for input(s): LDH in the last 72 hours.  Microbiology:  Microbiology Results (last 7 days)     ** No results  found for the last 168 hours. **          I have reviewed all pertinent labs within the past 24 hours.

## 2020-01-09 NOTE — PLAN OF CARE
AAOx3, afebrile, no c/o pain, requesting ambien this evening. Pt in lowest postion, side rails up x2, non skid footwear in place, call light within reach, pt verbalized understanding to call the nurse when needed.  Hand hygiene practiced per protocol. Will continue to monitor.

## 2020-01-09 NOTE — PLAN OF CARE
Problem: Physical Therapy Goal  Goal: Physical Therapy Goal  Description  Goals to be met by: 2020     Patient will increase functional independence with mobility by performin. Supine to sit with Set-up Gill MET  2. Sit to supine with Set-up Gill MET  3. Sit to stand transfer with Supervision  4. Bed to chair transfer with Supervision  5. Gait  x 150 feet with Supervision without AD.   6. Lower extremity exercise program x15 reps per handout, with supervision     Outcome: Ongoing, Progressing

## 2020-01-10 VITALS
OXYGEN SATURATION: 97 % | TEMPERATURE: 98 F | BODY MASS INDEX: 36.72 KG/M2 | DIASTOLIC BLOOD PRESSURE: 63 MMHG | WEIGHT: 233.94 LBS | HEIGHT: 67 IN | HEART RATE: 90 BPM | RESPIRATION RATE: 21 BRPM | SYSTOLIC BLOOD PRESSURE: 128 MMHG

## 2020-01-10 PROBLEM — I50.43 ACUTE ON CHRONIC COMBINED SYSTOLIC AND DIASTOLIC HEART FAILURE: Status: RESOLVED | Noted: 2019-09-04 | Resolved: 2020-01-10

## 2020-01-10 PROBLEM — I50.9 HEART FAILURE: Status: RESOLVED | Noted: 2020-01-02 | Resolved: 2020-01-10

## 2020-01-10 LAB
ALBUMIN SERPL BCP-MCNC: 2.6 G/DL (ref 3.5–5.2)
ALP SERPL-CCNC: 82 U/L (ref 55–135)
ALT SERPL W/O P-5'-P-CCNC: 9 U/L (ref 10–44)
ANION GAP SERPL CALC-SCNC: 9 MMOL/L (ref 8–16)
AST SERPL-CCNC: 8 U/L (ref 10–40)
BASOPHILS # BLD AUTO: 0.02 K/UL (ref 0–0.2)
BASOPHILS NFR BLD: 0.4 % (ref 0–1.9)
BILIRUB SERPL-MCNC: 0.4 MG/DL (ref 0.1–1)
BUN SERPL-MCNC: 28 MG/DL (ref 6–20)
CALCIUM SERPL-MCNC: 8.5 MG/DL (ref 8.7–10.5)
CHLORIDE SERPL-SCNC: 100 MMOL/L (ref 95–110)
CO2 SERPL-SCNC: 28 MMOL/L (ref 23–29)
CREAT SERPL-MCNC: 2.3 MG/DL (ref 0.5–1.4)
DIFFERENTIAL METHOD: ABNORMAL
EOSINOPHIL # BLD AUTO: 0.2 K/UL (ref 0–0.5)
EOSINOPHIL NFR BLD: 2.8 % (ref 0–8)
ERYTHROCYTE [DISTWIDTH] IN BLOOD BY AUTOMATED COUNT: 17.2 % (ref 11.5–14.5)
EST. GFR  (AFRICAN AMERICAN): 27.7 ML/MIN/1.73 M^2
EST. GFR  (NON AFRICAN AMERICAN): 24.1 ML/MIN/1.73 M^2
GLUCOSE SERPL-MCNC: 95 MG/DL (ref 70–110)
HCT VFR BLD AUTO: 27.1 % (ref 37–48.5)
HGB BLD-MCNC: 8.2 G/DL (ref 12–16)
IMM GRANULOCYTES # BLD AUTO: 0.06 K/UL (ref 0–0.04)
IMM GRANULOCYTES NFR BLD AUTO: 1.1 % (ref 0–0.5)
LYMPHOCYTES # BLD AUTO: 0.2 K/UL (ref 1–4.8)
LYMPHOCYTES NFR BLD: 3.2 % (ref 18–48)
MAGNESIUM SERPL-MCNC: 2.3 MG/DL (ref 1.6–2.6)
MCH RBC QN AUTO: 27 PG (ref 27–31)
MCHC RBC AUTO-ENTMCNC: 30.3 G/DL (ref 32–36)
MCV RBC AUTO: 89 FL (ref 82–98)
MONOCYTES # BLD AUTO: 0.2 K/UL (ref 0.3–1)
MONOCYTES NFR BLD: 3.9 % (ref 4–15)
NEUTROPHILS # BLD AUTO: 4.8 K/UL (ref 1.8–7.7)
NEUTROPHILS NFR BLD: 88.6 % (ref 38–73)
NRBC BLD-RTO: 0 /100 WBC
PLATELET # BLD AUTO: 360 K/UL (ref 150–350)
PMV BLD AUTO: 8.9 FL (ref 9.2–12.9)
POTASSIUM SERPL-SCNC: 3.5 MMOL/L (ref 3.5–5.1)
PROT SERPL-MCNC: 5.5 G/DL (ref 6–8.4)
RBC # BLD AUTO: 3.04 M/UL (ref 4–5.4)
SODIUM SERPL-SCNC: 137 MMOL/L (ref 136–145)
TACROLIMUS BLD-MCNC: 5.6 NG/ML (ref 5–15)
WBC # BLD AUTO: 5.39 K/UL (ref 3.9–12.7)

## 2020-01-10 PROCEDURE — 97116 GAIT TRAINING THERAPY: CPT | Mod: CQ

## 2020-01-10 PROCEDURE — 25000003 PHARM REV CODE 250: Performed by: STUDENT IN AN ORGANIZED HEALTH CARE EDUCATION/TRAINING PROGRAM

## 2020-01-10 PROCEDURE — 99238 HOSP IP/OBS DSCHRG MGMT 30/<: CPT | Mod: ,,, | Performed by: INTERNAL MEDICINE

## 2020-01-10 PROCEDURE — 93010 ELECTROCARDIOGRAM REPORT: CPT | Mod: ,,, | Performed by: INTERNAL MEDICINE

## 2020-01-10 PROCEDURE — 99238 PR HOSPITAL DISCHARGE DAY,<30 MIN: ICD-10-PCS | Mod: ,,, | Performed by: INTERNAL MEDICINE

## 2020-01-10 PROCEDURE — 93005 ELECTROCARDIOGRAM TRACING: CPT

## 2020-01-10 PROCEDURE — 63600175 PHARM REV CODE 636 W HCPCS: Performed by: INTERNAL MEDICINE

## 2020-01-10 PROCEDURE — 80053 COMPREHEN METABOLIC PANEL: CPT

## 2020-01-10 PROCEDURE — 93010 EKG 12-LEAD: ICD-10-PCS | Mod: ,,, | Performed by: INTERNAL MEDICINE

## 2020-01-10 PROCEDURE — 80197 ASSAY OF TACROLIMUS: CPT

## 2020-01-10 PROCEDURE — 25000003 PHARM REV CODE 250: Performed by: INTERNAL MEDICINE

## 2020-01-10 PROCEDURE — 63600175 PHARM REV CODE 636 W HCPCS: Performed by: STUDENT IN AN ORGANIZED HEALTH CARE EDUCATION/TRAINING PROGRAM

## 2020-01-10 PROCEDURE — 36415 COLL VENOUS BLD VENIPUNCTURE: CPT

## 2020-01-10 PROCEDURE — 97530 THERAPEUTIC ACTIVITIES: CPT | Mod: CQ

## 2020-01-10 PROCEDURE — 85025 COMPLETE CBC W/AUTO DIFF WBC: CPT

## 2020-01-10 PROCEDURE — 83735 ASSAY OF MAGNESIUM: CPT

## 2020-01-10 RX ORDER — PREDNISONE 5 MG/1
10 TABLET ORAL DAILY
Qty: 60 TABLET | Refills: 2 | Status: SHIPPED | OUTPATIENT
Start: 2020-01-10 | End: 2020-04-15 | Stop reason: SDUPTHER

## 2020-01-10 RX ORDER — HYDRALAZINE HYDROCHLORIDE 25 MG/1
75 TABLET, FILM COATED ORAL EVERY 8 HOURS
Qty: 270 TABLET | Refills: 11 | Status: ON HOLD | OUTPATIENT
Start: 2020-01-10 | End: 2020-01-27 | Stop reason: HOSPADM

## 2020-01-10 RX ORDER — VALGANCICLOVIR 450 MG/1
450 TABLET, FILM COATED ORAL EVERY OTHER DAY
Qty: 15 TABLET | Refills: 11 | Status: ON HOLD
Start: 2020-01-11 | End: 2020-01-27 | Stop reason: SDUPTHER

## 2020-01-10 RX ORDER — MIRTAZAPINE 30 MG/1
30 TABLET, FILM COATED ORAL NIGHTLY
Qty: 30 TABLET | Refills: 11 | Status: ON HOLD | OUTPATIENT
Start: 2020-01-10 | End: 2020-01-27 | Stop reason: HOSPADM

## 2020-01-10 RX ORDER — AMLODIPINE BESYLATE 10 MG/1
10 TABLET ORAL DAILY
Qty: 30 TABLET | Refills: 11 | Status: ON HOLD | OUTPATIENT
Start: 2020-01-11 | End: 2020-01-27 | Stop reason: HOSPADM

## 2020-01-10 RX ORDER — PANTOPRAZOLE SODIUM 40 MG/1
40 TABLET, DELAYED RELEASE ORAL DAILY
Qty: 30 TABLET | Refills: 11 | Status: SHIPPED | OUTPATIENT
Start: 2020-01-11 | End: 2021-01-19

## 2020-01-10 RX ORDER — FUROSEMIDE 80 MG/1
40 TABLET ORAL DAILY
Qty: 15 TABLET | Refills: 11 | Status: ON HOLD
Start: 2020-01-10 | End: 2020-01-27 | Stop reason: HOSPADM

## 2020-01-10 RX ORDER — TACROLIMUS 1 MG/1
CAPSULE ORAL
Qty: 180 CAPSULE | Refills: 11
Start: 2020-01-10 | End: 2020-01-28

## 2020-01-10 RX ADMIN — MYCOPHENOLATE MOFETIL 1000 MG: 250 CAPSULE ORAL at 08:01

## 2020-01-10 RX ADMIN — SULFAMETHOXAZOLE AND TRIMETHOPRIM 1 TABLET: 400; 80 TABLET ORAL at 08:01

## 2020-01-10 RX ADMIN — AMLODIPINE BESYLATE 10 MG: 10 TABLET ORAL at 08:01

## 2020-01-10 RX ADMIN — TACROLIMUS 3 MG: 1 CAPSULE ORAL at 08:01

## 2020-01-10 RX ADMIN — NYSTATIN 500000 UNITS: 100000 SUSPENSION ORAL at 08:01

## 2020-01-10 RX ADMIN — HYDRALAZINE HYDROCHLORIDE 75 MG: 50 TABLET, FILM COATED ORAL at 05:01

## 2020-01-10 RX ADMIN — POTASSIUM BICARBONATE 50 MEQ: 978 TABLET, EFFERVESCENT ORAL at 09:01

## 2020-01-10 RX ADMIN — PREDNISONE 10 MG: 10 TABLET ORAL at 08:01

## 2020-01-10 RX ADMIN — ATORVASTATIN CALCIUM 20 MG: 20 TABLET, FILM COATED ORAL at 08:01

## 2020-01-10 RX ADMIN — SITAGLIPTIN 50 MG: 50 TABLET, FILM COATED ORAL at 08:01

## 2020-01-10 RX ADMIN — DOCUSATE SODIUM 50 MG: 50 CAPSULE, LIQUID FILLED ORAL at 08:01

## 2020-01-10 RX ADMIN — OYSTER SHELL CALCIUM WITH VITAMIN D 2 TABLET: 500; 200 TABLET, FILM COATED ORAL at 08:01

## 2020-01-10 RX ADMIN — DOCUSATE SODIUM 50 MG: 50 CAPSULE, LIQUID FILLED ORAL at 02:01

## 2020-01-10 RX ADMIN — NYSTATIN 500000 UNITS: 100000 SUSPENSION ORAL at 02:01

## 2020-01-10 RX ADMIN — FUROSEMIDE 40 MG: 40 TABLET ORAL at 08:01

## 2020-01-10 RX ADMIN — PANTOPRAZOLE SODIUM 40 MG: 40 TABLET, DELAYED RELEASE ORAL at 08:01

## 2020-01-10 RX ADMIN — HYDRALAZINE HYDROCHLORIDE 75 MG: 50 TABLET, FILM COATED ORAL at 02:01

## 2020-01-10 RX ADMIN — POLYETHYLENE GLYCOL 3350 17 G: 17 POWDER, FOR SOLUTION ORAL at 08:01

## 2020-01-10 RX ADMIN — Medication 400 MG: at 08:01

## 2020-01-10 RX ADMIN — HEPARIN SODIUM 5000 UNITS: 5000 INJECTION, SOLUTION INTRAVENOUS; SUBCUTANEOUS at 08:01

## 2020-01-10 RX ADMIN — VENLAFAXINE HYDROCHLORIDE 150 MG: 37.5 CAPSULE, EXTENDED RELEASE ORAL at 08:01

## 2020-01-10 RX ADMIN — ASPIRIN 81 MG: 81 TABLET, COATED ORAL at 08:01

## 2020-01-10 NOTE — PT/OT/SLP PROGRESS
Physical Therapy Treatment    Patient Name:  Deborah Navas   MRN:  [id number]    Recommendations:     Discharge Recommendations:  home health PT   Discharge Equipment Recommendations: none   Barriers to discharge: None    Assessment:     Deborah Navas is a 49 y.o. female admitted with a medical diagnosis of GLO (acute kidney injury).  She presents with the following impairments/functional limitations:  weakness, impaired endurance, impaired self care skills, gait instability, impaired functional mobilty, impaired balance, impaired cardiopulmonary response to activity, orthopedic precautions .Pt  motivated and cooperative with treatment session. Pt Progressing with PT Intervention. Pt would continue to benefit from skilled PT to address overall functional mobility and goals. Goals remain appropriate      Rehab Prognosis: Good; patient would benefit from acute skilled PT services to address these deficits and reach maximum level of function.    Recent Surgery: Procedure(s) (LRB):  BIOPSY, WITH US GUIDANCE (N/A)  INSERTION, CATHETER, RIGHT HEART (Right) 3 Days Post-Op    Plan:     During this hospitalization, patient to be seen 3 x/week to address the identified rehab impairments via gait training, therapeutic activities, therapeutic exercises, neuromuscular re-education and progress toward the following goals:    · Plan of Care Expires:  02/06/20    Subjective     Chief Complaint: SOB  Pain/Comfort:  · Pain Rating 1: 0/10  · Pain Rating Post-Intervention 1: 0/10      Objective:     Communicated with RN prior to session.  Patient found supine with telemetry upon PT entry to room.     General Precautions: Standard, sternal, neutropenic   Orthopedic Precautions:N/A   Braces: N/A     Functional Mobility:  · Bed Mobility:     · Supine to Sit: independence  · Transfers:  Sit to Stand:  stand by assistance with no AD  · Gait: 90ft and 100 ft , with mask on with  CGA, no AD, standing rest break in between,  increased time to recover from shortness of breath    AM-PAC 6 CLICK MOBILITY  Turning over in bed (including adjusting bedclothes, sheets and blankets)?: 4  Sitting down on and standing up from a chair with arms (e.g., wheelchair, bedside commode, etc.): 3  Moving from lying on back to sitting on the side of the bed?: 4  Moving to and from a bed to a chair (including a wheelchair)?: 3  Need to walk in hospital room?: 3  Climbing 3-5 steps with a railing?: 3  Basic Mobility Total Score: 20       Therapeutic Activities and Exercises:   educated patient on progress, safety,d/c,PT POC, on the effects of prolonged immobility and the importance of performing OOB activity and exercises to promote healing and reduce recovery time   Patient performed therex  seated in bedside chair B LE AROM AP, LAQ, Hip Flexion, Hip Abd/Add   Sit <> stand from chair x 5 reps with SBA with vcs for sternal precautions  Updated white board with appropriate PT mobility information for medical team notification  Sternal Precautions: patient able to voice?3/3 precautions without assistance.?Pt provided education of sternal precautions.?Patient able to maintain precautions throughout session with?min?verbal cues  Pt encouraged to ambulate in hallways 3x/day with nursing or family assistance to improve endurance.   Pt safe to ambulate in hallway with RN or PCT assistance   Bedside table in front of patient and area set up for function, convenience, and safety. RN aware of patient's mobility needs and status. Questions/concerns addressed within PTA scope of practice; patient with no further questions. Time was provided for active listening, discussion of health disposition, and discussion of safe discharge. Pt?verbalized?agreement       Patient left up in chair with all lines intact, call button in reach, nsg notified and family present..    GOALS:   Multidisciplinary Problems     Physical Therapy Goals        Problem: Physical Therapy Goal    Goal  Priority Disciplines Outcome Goal Variances Interventions   Physical Therapy Goal     PT, PT/OT Ongoing, Progressing     Description:  Goals to be met by: 2020     Patient will increase functional independence with mobility by performin. Supine to sit with Set-up [place] MET  2. Sit to supine with Set-up [place] MET  3. Sit to stand transfer with Supervision  4. Bed to chair transfer with Supervision  5. Gait  x 150 feet with Supervision without AD.   6. Lower extremity exercise program x15 reps per handout, with supervision                       Time Tracking:     PT Received On: 01/10/20  PT Start Time: 930     PT Stop Time: 954  PT Total Time (min): 24 min     Billable Minutes: Gait Training 14 and Therapeutic Activity 10    Treatment Type: Treatment  PT/PTA: PTA     PTA Visit Number: 1     Trevin Tripathi PTA  01/10/2020

## 2020-01-10 NOTE — PLAN OF CARE
Problem: Physical Therapy Goal  Goal: Physical Therapy Goal  Description  Goals to be met by: 2020     Patient will increase functional independence with mobility by performin. Supine to sit with Set-up [place] MET  2. Sit to supine with Set-up [place] MET  3. Sit to stand transfer with Supervision  4. Bed to chair transfer with Supervision  5. Gait  x 150 feet with Supervision without AD.   6. Lower extremity exercise program x15 reps per handout, with supervision      Outcome: Ongoing, Progressing   Pt progressing towards goals. continue with PT POC.Goals remain appropriate.  Trevin Tripathi PTA

## 2020-01-10 NOTE — PLAN OF CARE
-AAOx4  -RFA 20g PIV saline locked.   -Midsternal incision reynold with DB and sutures. Scheduled for suture removal OP.   -R groin incision hard and swollen. Poss hematoma. CTA cx will monitor for now.   -CXR with pulm edema. Pt transitioned to po lasix.   -Cr trending down.   -Walked in the hallway with friend.   -Poss dc pending am labs.   -Ambulates independently. Wears non slip socks when oob. Free from falls/injuries thus far this shift.   -Bed in lowest, locked position. Bed rails up x2. Call light and personal belongings within reach.   -Will continue to monitor.

## 2020-01-10 NOTE — HOSPITAL COURSE
She was started on IV diuretic and low dobutamine due to concern for RV failure. Repeat RHC on 1/7 with upper normal filling pressures (RA 12, wedge 15) and high CO/CI. TTE revealed LVEF 65% and normal RV function, mild MR, mod-sev TR, IVC not seen; no pericardial effusion appreciated. She was switch to PO furosemide and her renal function improved slightly. She also had significant tremors on admission and theophylline levels were found to be severely elevated so it was discontinued. Tacrolimus dose was adjusted according to levels for a goal of 8, last level today is 5.6. BP was above goal and she was started on amlodipine and hydralazine with good response. EMB from 12/31 and 1/7 were both negative for Ab or cell-mediated rejection. There was also concern for increasing size of R groin hematoma that was first noted post-transplant, CTS evaluated patient and recommended conservative management and observation. Patient is currently stable to be discharged home and follow-up as outpatient; she has an appointment for her next EMB on 1/14/2020 and she will continue work-up and evaluation for sarcoidosis in clinic.

## 2020-01-10 NOTE — DISCHARGE SUMMARY
[facility]  Heart Transplant  Discharge Summary      Patient Name: Deborah Navas  MRN: [id number]  Admission Date: 1/2/2020  Hospital Length of Stay: 8 days  Discharge Date and Time: 01/10/2020 11:53 AM  Attending Physician: Dr. Lua  Discharging Provider: Triny Yusuf MD  Primary Care Provider: Valeria Verma MD     HPI: 50yo F s/p OHTx on 12/16/2019 for NICM/ HFrEF with HM3 on 9/2019, removed at time of transplant, as well as AFl post LVAD s/p NADINE/DCCV, VT/VF (some a/w hypokalemia), HLD, obesity, and OA, who presented for worsening LEGER and GLO. RHC on 12/31 with elevated filling pressures (RA 17, PCWP 15). Pathology of native heart revealed non-caseating granulomas typical of sarcoidosis. Presentation  was likely c/w cardiorenal syndrome in the setting of ADHF/ fluid overload plus side effect from tacrolimus.       Procedure(s) (LRB):  BIOPSY, WITH US GUIDANCE (N/A)  INSERTION, CATHETER, RIGHT HEART (Right)     Hospital Course: She was started on IV diuretic and low dobutamine due to concern for RV failure. Repeat RHC on 1/7 with upper normal filling pressures (RA 12, wedge 15) and high CO/CI. TTE revealed LVEF 65% and normal RV function, mild MR, mod-sev TR, IVC not seen; no pericardial effusion appreciated. She was switched to PO furosemide and her renal function improved slightly (last creat 2.3, from 3.2). She also had significant tremors on admission and theophylline levels were found to be severely elevated so it was discontinued, with some improvement. Tacrolimus dose was adjusted according to levels for a goal of 8, last level today is 5.6. BP was above goal and she was started on amlodipine and hydralazine with good response. EMB from 12/31 and 1/7 were both negative for Ab or cell-mediated rejection. There was also concern for increasing size of R groin hematoma that was first noted post-transplant, CTS evaluated patient and recommended conservative  management and observation. Patient is currently stable to be discharged home and follow-up as outpatient; she has an appointment for her next EMB on 1/14/2020 and she will continue work-up and evaluation for sarcoidosis in clinic.       Consults (From admission, onward)        Status Ordering Provider     Inpatient consult to Cardiothoracic Surgery  Once     Provider:  (Not yet assigned)    Completed USHA NOVA     Inpatient consult to Interventional Radiology  Once     Provider:  (Not yet assigned)    Completed SHASHI CASTORENA     Inpatient consult to Midline team  Once     Provider:  (Not yet assigned)    Completed MOIRA CLEANING          Pending Diagnostic Studies:     Procedure Component Value Units Date/Time    Freeze and Hold, [facility] [[id number]] Collected:  01/06/20 0724    Order Status:  Sent Lab Status:  No result     Specimen:  Body Fluid from Pleural Fluid         Final Active Diagnoses:    Diagnosis Date Noted POA    Status post heart transplant [Z94.1] 12/16/2019 Not Applicable    Immunosuppression [D89.9]  Yes    CKD (chronic kidney disease) [N18.9] 09/07/2019 Yes      Problems Resolved During this Admission:    Diagnosis Date Noted Date Resolved POA    PRINCIPAL PROBLEM:  GLO (acute kidney injury) [N17.9]  01/10/2020 Yes    Acute on chronic combined systolic and diastolic heart failure [I50.43] 09/04/2019 01/10/2020 Yes    Heart failure [I50.9] 01/02/2020 01/10/2020 Yes      Discharged Condition: stable    Disposition: Home    Follow Up:  Follow-up Information     [facility] TR[facility]PL[facility].    Specialty:  Transplant  Contact information:  [address] [place] [address] [id number] [phone]               Medications:  Reconciled Home Medications:      Medication List      START taking these medications    amLODIPine 10 MG tablet  Commonly known as:  NORVASC  Take 1 tablet (10 mg total) by mouth once daily.  Start taking on:  January 11, 2020     hydrALAZINE 25 MG  tablet  Commonly known as:  APRESOLINE  Take 3 tablets (75 mg total) by mouth every 8 (eight) hours.     pantoprazole 40 MG tablet  Commonly known as:  PROTONIX  Take 1 tablet (40 mg total) by mouth once daily.  Start taking on:  January 11, 2020        CHANGE how you take these medications    furosemide 80 MG tablet  Commonly known as:  LASIX  Take 0.5 tablets (40 mg total) by mouth once daily.  What changed:    · how much to take  · when to take this     mirtazapine 30 MG tablet  Commonly known as:  REMERON  Take 1 tablet (30 mg total) by mouth every evening.  What changed:    · medication strength  · how much to take     predniSONE 5 MG tablet  Commonly known as:  DELTASONE  Take 2 tablets (10 mg total) by mouth once daily.  What changed:  how much to take     tacrolimus 1 MG Cap  Commonly known as:  PROGRAF  Take 3 capsules (3 mg total) by mouth every morning AND 3 capsules (3 mg total) every evening.  What changed:  See the new instructions.     valGANciclovir 450 mg Tab  Commonly known as:  VALCYTE  Take 1 tablet (450 mg total) by mouth every other day.  Start taking on:  January 11, 2020  What changed:  when to take this        CONTINUE taking these medications    Accu-Chek Fastclix Lancet [facility]  Generic drug:  lancets  Use to test blood glucose 2 (two) times daily.     Accu-Chek Guide Me Glucose Mtr Misc  Generic drug:  blood-glucose meter  USE AS DIRECTED     Accu-Chek Guide Strp  Generic drug:  blood sugar diagnostic  Use to test blood glucose 2 (two) times daily.     aspirin 81 MG EC tablet  Commonly known as:  ECOTRIN  Take 1 tablet (81 mg total) by mouth once daily.     atorvastatin 20 MG tablet  Commonly known as:  LIPITOR  Take 1 tablet (20 mg total) by mouth once daily.     calcium carbonate-vitamin D3 1,000 mg(2,500 mg)-800 unit Tab  Take 1 tablet by mouth once daily.     ferrous sulfate 325 mg (65 mg iron) Tab tablet  Commonly known as:  FEOSOL  Take 1 tablet (325 mg total) by mouth 2 (two)  times daily.     gabapentin 300 MG capsule  Commonly known as:  NEURONTIN  Take 1 capsule (300 mg total) by mouth every evening.     Januvia 50 MG Tab  Generic drug:  SITagliptin  Take 1 tablet (50 mg total) by mouth once daily.     magnesium oxide 400 mg (241.3 mg magnesium) tablet  Commonly known as:  MAG-OX  Take 1 tablet (400 mg total) by mouth once daily.     mycophenolate 250 mg Cap  Commonly known as:  CELLCEPT  Take 4 capsules (1,000 mg total) by mouth 2 (two) times daily.     [facility] transplant care kit 1 Kit  Welcome to [facility].  This is your transplant care kit.     oxyCODONE 10 mg Tab immediate release tablet  Commonly known as:  ROXICODONE  Take 1 tablet (10 mg total) by mouth every 8 (eight) hours as needed.     promethazine 25 MG tablet  Commonly known as:  PHENERGAN  Take 1 tablet (25 mg total) by mouth every 6 (six) hours as needed for Nausea.     senna-docusate 8.6-50 mg 8.6-50 mg per tablet  Commonly known as:  PERICOLACE  Take 2 tablets by mouth 2 (two) times daily as needed for Constipation.     sulfamethoxazole-trimethoprim 400-80mg 400-80 mg per tablet  Commonly known as:  BACTRIM,SEPTRA  Take 1 tablet by mouth once daily.     venlafaxine 150 MG Cp24  Commonly known as:  EFFEXOR-XR  Take 1 capsule (150 mg total) by mouth once daily.     zolpidem 5 MG Tab  Commonly known as:  AMBIEN  Take 1 tablet (5 mg total) by mouth nightly as needed for insomnia.        STOP taking these medications    nystatin 100,000 unit/mL suspension  Commonly known as:  MYCOSTATIN     ranitidine 150 MG tablet  Commonly known as:  ZANTAC     Nestor-24 400 mg 24 hr capsule  Generic drug:  theophylline            Triny Yusuf MD  Heart Transplant  [facility]

## 2020-01-10 NOTE — NURSING
Spoke to pt on phone as she awaited her discharge papers. Reviewed med changes - stopped nystatin, theophylline. Has her appts for Tuesday 1/14.

## 2020-01-10 NOTE — PROGRESS NOTES
Medication delivered to the bedside. Discharge instructions reviewed with the patient and her friend. Both verbalized their understanding and deny any questions or concerns at this time. Right FA PIV, telemetry monitor, and Visi monitor removed. Patient preparing for discharge. Patient's friend has a wheelchair at the bedside to take the patient to the car.

## 2020-01-10 NOTE — PROGRESS NOTES
Discharge Note:    BENIGNO met with pt and friend Flavia in pt's room this morning in order to review discharge plan. Pt was AAOx4, pleasant and stating that she is sleepy but has already gotten up and walked around. Pt states that she is working hard to stay awake during the day. Pt states that she feels ready to return to the [facility] this afternoon. BENIGNO set pt back up with [facility] for SN, PT/OT (ph#[phone], fax# [phone]). BENIGNO also provided pt and caregiver with information on the Heart Transplant Support Group and Caregiver Support Group. Pt and caregiver coping adequately and denying any further needs at this time. SW remains available.     BENIGNO received confirmation via [facility] that pt can be readmitted for HH with [facility]. BENIGNO remains available.

## 2020-01-10 NOTE — PROGRESS NOTES
DISCHARGE NOTE:    Deborah Navas is a 49 y.o. female s/p heart transplant on 12/16/2019 admitted for evaluation of nausea, and LE weakness.     Past Medical History:   Diagnosis Date    Fractures     History of ventricular fibrillation 9/4/2019    History of ventricular tachycardia 9/4/2019    Hyperlipidemia     Migraine     Multinodular goiter 9/5/2019    Osteoarthritis        CMV High Risk  HBV/HCV -/-    Hospital Course: During her hospital stay Ms. Navas continued to have tremors. A repeat theophylline level was drawn and resulted at 26. Theophylline was discontinued and tremors decreased in severity. She underwent RHC and EMB.     EMB: 1.7 ISHLT0/ pAMR 0   HLA: 12.31 No DSA   TTE: 1.7 EF 65%    Allergies:   Review of patient's allergies indicates:   Allergen Reactions    Adhesive Blisters     Reaction to area in chest and up only    Codeine Itching       Patient Pharmacy: [facility]     Pharmacy Interventions/Recommendations:    1) Transplant Immunosuppression: FK 3mg BID (goal 8-10 for CKD); MMF 1000mg BID; Prednisone 10mg orally daily     2) Opportunistic Infection prophylaxis: Valcyte 450mg QOD, Bactrim SS daily     3) Patient Counseling/Education:  Completed, new medication administration card provided    4) DAA Consideration:  NA    5) Patient Assistance Information: NA    6) The following medications have been placed on HOLD and should be restarted in the outpatient setting (when appropriate): None. Nystatin therapy completed and discontinued. Famotidine changed to PPI for ongoing GERD and nausea.     Discharge Medications:   Deborah Navas   Home Medication Instructions MARLEY:[id number]    Printed on:01/10/20 1852   Medication Information                      amLODIPine (NORVASC) 10 MG tablet  Take 1 tablet (10 mg total) by mouth once daily.             aspirin (ECOTRIN) 81 MG EC tablet  Take 1 tablet (81 mg total) by mouth once daily.             atorvastatin  (LIPITOR) 20 MG tablet  Take 1 tablet (20 mg total) by mouth once daily.             blood sugar diagnostic Strp  Use to test blood glucose 2 (two) times daily.             blood-glucose meter Misc  USE AS DIRECTED             calcium carbonate-vitamin D3 1,000 mg(2,500 mg)-800 unit Tab  Take 1 tablet by mouth once daily.             ferrous sulfate (FEOSOL) 325 mg (65 mg iron) Tab tablet  Take 1 tablet (325 mg total) by mouth 2 (two) times daily.             furosemide (LASIX) 80 MG tablet  Take 0.5 tablets (40 mg total) by mouth once daily.             gabapentin (NEURONTIN) 300 MG capsule  Take 1 capsule (300 mg total) by mouth every evening.             hydrALAZINE (APRESOLINE) 25 MG tablet  Take 3 tablets (75 mg total) by mouth every 8 (eight) hours.             lancets Misc  Use to test blood glucose 2 (two) times daily.             magnesium oxide (MAG-OX) 400 mg (241.3 mg magnesium) tablet  Take 1 tablet (400 mg total) by mouth once daily.             mirtazapine (REMERON) 30 MG tablet  Take 1 tablet (30 mg total) by mouth every evening.             mycophenolate (CELLCEPT) 250 mg Cap  Take 4 capsules (1,000 mg total) by mouth 2 (two) times daily.             [facility] [place] transplant care kit  Welcome to [facility].  This is your transplant care kit.             oxyCODONE (ROXICODONE) 10 mg Tab immediate release tablet  Take 1 tablet (10 mg total) by mouth every 8 (eight) hours as needed.             pantoprazole (PROTONIX) 40 MG tablet  Take 1 tablet (40 mg total) by mouth once daily.             predniSONE (DELTASONE) 5 MG tablet  Take 2 tablets (10 mg total) by mouth once daily.             promethazine (PHENERGAN) 25 MG tablet  Take 1 tablet (25 mg total) by mouth every 6 (six) hours as needed for Nausea.             senna-docusate 8.6-50 mg (PERICOLACE) 8.6-50 mg per tablet  Take 2 tablets by mouth 2 (two) times daily as needed for Constipation.             SITagliptin (JANUVIA) 50 MG Tab  Take 1  tablet (50 mg total) by mouth once daily.             sulfamethoxazole-trimethoprim 400-80mg (BACTRIM,SEPTRA) 400-80 mg per tablet  Take 1 tablet by mouth once daily.             tacrolimus (PROGRAF) 1 MG Cap  Take 3 capsules (3 mg total) by mouth every morning AND 3 capsules (3 mg total) every evening.             valGANciclovir (VALCYTE) 450 mg Tab  Take 1 tablet (450 mg total) by mouth every other day.             venlafaxine (EFFEXOR-XR) 150 MG Cp24  Take 1 capsule (150 mg total) by mouth once daily.             zolpidem (AMBIEN) 5 MG Tab  Take 1 tablet (5 mg total) by mouth nightly as needed for insomnia.                 Deborah and her caregiver verbalized their understanding and had the opportunity to ask questions.

## 2020-01-10 NOTE — PHYSICIAN QUERY
PT Name: Deborah Navas  MR #: [id number]  Physician Query Form - Renal Condition Clarification     CDS/: Concepcion Segura               Contact information: [email or web address]     This form is a permanent document in the medical record.     QueryDate: January 10, 2020    By submitting this query, we are merely seeking further clarification of documentation. Please utilize your independent clinical judgment when addressing the question(s) below.    The Medical record contains the following:   Indicator Supporting Clinical Findings Location in Medical Record    Kidney (Renal) Insufficiency     X Kidney (Renal) Failure / Injury CKD (chronic kidney disease)    GLO   DC Summary 1/10    DC Summary 1/10    Nephrotoxic Agents     X BUN/Creatinine GFR  eGFR of 30 new baseline     CR 2.4- 3.3- 2.3  BUN 35-41- 28  GFR 22.8- 15.5- 24.1   Heart Transplant PN 1/8    Lab 1/2    Urine: Casts         Eosinophils      Dehydration      Nausea/Vomiting      Dialysis/CRRT      Treatment:      Other:      Acute Kidney Injury / Acute Renal Failure has different defining criteria. A generally accepted guideline  is:   A greater than 100% (2X) rise in serum creatinine from baseline* occurring during the course of a single hospital stay.   *Baseline as determined by the providers judgment and consideration of previous lab values and other documentation, if available.    A diagnosis of Acute Kidney Injury/ Acute Renal Failure should incorporate abnormal labs and clinical findings that are clinically significant      References: 1. Lexa et al. Acute renal failure-definition, outcome measures, animal models, fluid therapy and information technology needs: the Second International Consensus Conference of the Acute Dialysis Quality Initiative (ADQI) Group. Crit Care 2004; 8:B204; 2. Yusra et al. Acute Kidney Injury Network: report of an initiative to improve outcomes in acute kidney injury. Crit Care 2007; 11:R31; 3.  Kidney Disease: Improving Global Outcomes (KDIGO). Acute Kidney Injury Work Group. KDIGO clinical practice guidelines for acute kidney injury. Kidney Int Suppl 2012; 2:1.    The clinical guidelines noted below is only a system guideline, it does not replace the providers clinical judgment.    Provider, please specify the diagnosis or diagnoses associated with above clinical findings.  Provider, please further specify the diagnosis of CKD associated with above clinical findings.    [ x  ] Chronic Kidney Disease (CKD) stage 3   Moderately reduced kidney function eGFR 30-59    [   ] Chronic Kidney Disease (CKD) stage 4  Severely reduced kidney function eGFR 15-29   [   ] Other (please specify): _________________________________   [   ]  Clinically Undetermined       Please document in your progress notes daily for the duration of treatment until resolved and include in your discharge summary.

## 2020-01-14 ENCOUNTER — HOSPITAL ENCOUNTER (OUTPATIENT)
Facility: HOSPITAL | Age: 50
Discharge: HOME | End: 2020-01-14
Attending: INTERNAL MEDICINE | Admitting: INTERNAL MEDICINE
Payer: COMMERCIAL

## 2020-01-14 ENCOUNTER — HOSPITAL ENCOUNTER (OUTPATIENT)
Dept: RADIOLOGY | Facility: HOSPITAL | Age: 50
Discharge: HOME | End: 2020-01-14
Attending: INTERNAL MEDICINE
Payer: COMMERCIAL

## 2020-01-14 ENCOUNTER — HOSPITAL ENCOUNTER (OUTPATIENT)
Dept: CARDIOLOGY | Facility: CLINIC | Age: 50
Discharge: HOME | End: 2020-01-14
Payer: COMMERCIAL

## 2020-01-14 ENCOUNTER — OFFICE VISIT (OUTPATIENT)
Dept: TRANSPLANT | Facility: CLINIC | Age: 50
End: 2020-01-14
Payer: COMMERCIAL

## 2020-01-14 ENCOUNTER — CLINICAL SUPPORT (OUTPATIENT)
Dept: CARDIOTHORACIC SURGERY | Facility: CLINIC | Age: 50
End: 2020-01-14
Payer: COMMERCIAL

## 2020-01-14 VITALS
BODY MASS INDEX: 34.61 KG/M2 | SYSTOLIC BLOOD PRESSURE: 138 MMHG | WEIGHT: 221 LBS | BODY MASS INDEX: 35.05 KG/M2 | HEART RATE: 88 BPM | DIASTOLIC BLOOD PRESSURE: 73 MMHG | OXYGEN SATURATION: 99 % | HEART RATE: 96 BPM | HEIGHT: 67 IN | SYSTOLIC BLOOD PRESSURE: 136 MMHG | WEIGHT: 223.31 LBS | DIASTOLIC BLOOD PRESSURE: 69 MMHG

## 2020-01-14 VITALS
SYSTOLIC BLOOD PRESSURE: 123 MMHG | WEIGHT: 224.63 LBS | HEIGHT: 67 IN | HEART RATE: 83 BPM | DIASTOLIC BLOOD PRESSURE: 64 MMHG | BODY MASS INDEX: 35.26 KG/M2

## 2020-01-14 DIAGNOSIS — D86.85 CARDIAC SARCOIDOSIS: ICD-10-CM

## 2020-01-14 DIAGNOSIS — T38.0X5S ADVERSE EFFECT OF ADRENAL CORTICAL STEROIDS, SEQUELA: ICD-10-CM

## 2020-01-14 DIAGNOSIS — Z94.1 STATUS POST HEART TRANSPLANT: ICD-10-CM

## 2020-01-14 DIAGNOSIS — Z94.1 STATUS POST HEART TRANSPLANT: Primary | ICD-10-CM

## 2020-01-14 DIAGNOSIS — D84.9 IMMUNOSUPPRESSION: ICD-10-CM

## 2020-01-14 DIAGNOSIS — N18.30 STAGE 3 CHRONIC KIDNEY DISEASE: ICD-10-CM

## 2020-01-14 DIAGNOSIS — D86.85 CARDIAC SARCOIDOSIS: Primary | ICD-10-CM

## 2020-01-14 LAB
ASCENDING AORTA: 2.06 CM
AV INDEX (PROSTH): 0.93
AV MEAN GRADIENT: 7 MMHG
AV PEAK GRADIENT: 13 MMHG
AV VALVE AREA: 2.89 CM2
AV VELOCITY RATIO: 0.8
BSA FOR ECHO PROCEDURE: 2.19 M2
CV ECHO LV RWT: 0.36 CM
DOP CALC AO PEAK VEL: 1.79 M/S
DOP CALC AO VTI: 28.05 CM
DOP CALC LVOT AREA: 3.1 CM2
DOP CALC LVOT DIAMETER: 1.99 CM
DOP CALC LVOT PEAK VEL: 1.43 M/S
DOP CALC LVOT STROKE VOLUME: 80.98 CM3
DOP CALCLVOT PEAK VEL VTI: 26.05 CM
E/E' RATIO: 12.89 M/S
ECHO LV POSTERIOR WALL: 0.84 CM (ref 0.6–1.1)
FRACTIONAL SHORTENING: 58 % (ref 28–44)
INTERVENTRICULAR SEPTUM: 0.91 CM (ref 0.6–1.1)
LEFT INTERNAL DIMENSION IN SYSTOLE: 1.96 CM (ref 2.1–4)
LEFT VENTRICLE DIASTOLIC VOLUME INDEX: 46.46 ML/M2
LEFT VENTRICLE DIASTOLIC VOLUME: 98.45 ML
LEFT VENTRICLE MASS INDEX: 63 G/M2
LEFT VENTRICLE SYSTOLIC VOLUME INDEX: 5.7 ML/M2
LEFT VENTRICLE SYSTOLIC VOLUME: 12.04 ML
LEFT VENTRICULAR INTERNAL DIMENSION IN DIASTOLE: 4.62 CM (ref 3.5–6)
LEFT VENTRICULAR MASS: 133.61 G
LV LATERAL E/E' RATIO: 11.6 M/S
LV SEPTAL E/E' RATIO: 14.5 M/S
MV PEAK E VEL: 1.16 M/S
PISA TR MAX VEL: 2.81 M/S
RA PRESSURE: 3 MMHG
RIGHT VENTRICULAR END-DIASTOLIC DIMENSION: 3.95 CM
RV TISSUE DOPPLER FREE WALL SYSTOLIC VELOCITY 1 (APICAL 4 CHAMBER VIEW): 7.81 CM/S
SINUS: 2.59 CM
STJ: 2.15 CM
TDI LATERAL: 0.1 M/S
TDI SEPTAL: 0.08 M/S
TDI: 0.09 M/S
TR MAX PG: 32 MMHG
TRICUSPID ANNULAR PLANE SYSTOLIC EXCURSION: 1.34 CM
TV REST PULMONARY ARTERY PRESSURE: 35 MMHG

## 2020-01-14 PROCEDURE — 99215 OFFICE O/P EST HI 40 MIN: CPT | Mod: S$GLB,,, | Performed by: INTERNAL MEDICINE

## 2020-01-14 PROCEDURE — 99999 PR PBB SHADOW E&M-EST. PATIENT-LVL IV: ICD-10-PCS | Mod: PBBFAC,,, | Performed by: INTERNAL MEDICINE

## 2020-01-14 PROCEDURE — 3008F BODY MASS INDEX DOCD: CPT | Mod: CPTII,S$GLB,, | Performed by: INTERNAL MEDICINE

## 2020-01-14 PROCEDURE — 25000003 PHARM REV CODE 250: Performed by: INTERNAL MEDICINE

## 2020-01-14 PROCEDURE — 99024 POSTOP FOLLOW-UP VISIT: CPT | Mod: S$GLB,,, | Performed by: NURSE PRACTITIONER

## 2020-01-14 PROCEDURE — 71046 X-RAY EXAM CHEST 2 VIEWS: CPT | Mod: 26,,, | Performed by: RADIOLOGY

## 2020-01-14 PROCEDURE — 99215 PR OFFICE/OUTPT VISIT, EST, LEVL V, 40-54 MIN: ICD-10-PCS | Mod: S$GLB,,, | Performed by: INTERNAL MEDICINE

## 2020-01-14 PROCEDURE — 71046 XR CHEST PA AND LATERAL: ICD-10-PCS | Mod: 26,,, | Performed by: RADIOLOGY

## 2020-01-14 PROCEDURE — 71046 X-RAY EXAM CHEST 2 VIEWS: CPT | Mod: TC,FY

## 2020-01-14 PROCEDURE — 99999 PR PBB SHADOW E&M-EST. PATIENT-LVL II: CPT | Mod: PBBFAC,,, | Performed by: NURSE PRACTITIONER

## 2020-01-14 PROCEDURE — 3074F SYST BP LT 130 MM HG: CPT | Mod: CPTII,S$GLB,, | Performed by: INTERNAL MEDICINE

## 2020-01-14 PROCEDURE — C1894 INTRO/SHEATH, NON-LASER: HCPCS | Performed by: INTERNAL MEDICINE

## 2020-01-14 PROCEDURE — 88342 IMHCHEM/IMCYTCHM 1ST ANTB: CPT | Mod: 26,,, | Performed by: PATHOLOGY

## 2020-01-14 PROCEDURE — 88307 PR  SURG PATH,LEVEL V: ICD-10-PCS | Mod: 26,,, | Performed by: PATHOLOGY

## 2020-01-14 PROCEDURE — 88342 IMHCHEM/IMCYTCHM 1ST ANTB: CPT | Mod: 59 | Performed by: PATHOLOGY

## 2020-01-14 PROCEDURE — 99999 PR PBB SHADOW E&M-EST. PATIENT-LVL IV: CPT | Mod: PBBFAC,,, | Performed by: INTERNAL MEDICINE

## 2020-01-14 PROCEDURE — 99999 PR PBB SHADOW E&M-EST. PATIENT-LVL II: ICD-10-PCS | Mod: PBBFAC,,, | Performed by: NURSE PRACTITIONER

## 2020-01-14 PROCEDURE — 3074F PR MOST RECENT SYSTOLIC BLOOD PRESSURE < 130 MM HG: ICD-10-PCS | Mod: CPTII,S$GLB,, | Performed by: INTERNAL MEDICINE

## 2020-01-14 PROCEDURE — 99024 PR POST-OP FOLLOW-UP VISIT: ICD-10-PCS | Mod: S$GLB,,, | Performed by: NURSE PRACTITIONER

## 2020-01-14 PROCEDURE — 88342 CHG IMMUNOCYTOCHEMISTRY: ICD-10-PCS | Mod: 26,,, | Performed by: PATHOLOGY

## 2020-01-14 PROCEDURE — 3078F DIAST BP <80 MM HG: CPT | Mod: CPTII,S$GLB,, | Performed by: INTERNAL MEDICINE

## 2020-01-14 PROCEDURE — 93505 ENDOMYOCARDIAL BIOPSY: CPT | Mod: 26,,, | Performed by: INTERNAL MEDICINE

## 2020-01-14 PROCEDURE — 88307 TISSUE EXAM BY PATHOLOGIST: CPT | Mod: 26,,, | Performed by: PATHOLOGY

## 2020-01-14 PROCEDURE — 88307 TISSUE EXAM BY PATHOLOGIST: CPT | Performed by: PATHOLOGY

## 2020-01-14 PROCEDURE — 93505 PR BIOPSY OF HEART LINING: ICD-10-PCS | Mod: 26,,, | Performed by: INTERNAL MEDICINE

## 2020-01-14 PROCEDURE — 93505 ENDOMYOCARDIAL BIOPSY: CPT | Performed by: INTERNAL MEDICINE

## 2020-01-14 PROCEDURE — 27201423 OPTIME MED/SURG SUP & DEVICES STERILE SUPPLY: Performed by: INTERNAL MEDICINE

## 2020-01-14 PROCEDURE — 3078F PR MOST RECENT DIASTOLIC BLOOD PRESSURE < 80 MM HG: ICD-10-PCS | Mod: CPTII,S$GLB,, | Performed by: INTERNAL MEDICINE

## 2020-01-14 PROCEDURE — 3008F PR BODY MASS INDEX (BMI) DOCUMENTED: ICD-10-PCS | Mod: CPTII,S$GLB,, | Performed by: INTERNAL MEDICINE

## 2020-01-14 RX ORDER — SODIUM CHLORIDE 0.9 G/100ML
IRRIGANT IRRIGATION
Status: DISCONTINUED | OUTPATIENT
Start: 2020-01-14 | End: 2020-01-14 | Stop reason: HOSPADM

## 2020-01-14 RX ORDER — LIDOCAINE HYDROCHLORIDE 20 MG/ML
INJECTION, SOLUTION INFILTRATION; PERINEURAL
Status: DISCONTINUED | OUTPATIENT
Start: 2020-01-14 | End: 2020-01-14 | Stop reason: HOSPADM

## 2020-01-14 NOTE — INTERVAL H&P NOTE
Patient seen and examined. No interval change since last H&P. Here for an echo guided RV biopsy.  Access via the right IJ  7 Fr venous sheath  Risks and benefits of the procedure were explained to the patient. All questions were answered.  Consents were signed and in the chart.    Renetta Chaudhari MD

## 2020-01-14 NOTE — BRIEF OP NOTE
[facility]  Brief Operative Note  Cardiology    SUMMARY     Surgery Date: 1/14/2020     Surgeon(s) and Role:     Renetta Chaudhari MD - Primary       Assisting Surgeon:  Aldo Iverson MD       Pre-op Diagnosis:  S/p OHTx     Post-op Diagnosis: s/p OHTx     Procedure Performed: Echo guided RV biopsy     Description of the findings of the procedure: Echo guided RV biopsy performed via the right IJ. Patient tolerated the procedure well. 4 myocardial pieces obtained. 3 sent for H&E and 1 sent for Immuno.      Estimated Blood Loss: < 10 cc     Plan:   - Routine post-cath care  - Echo report to follow separately.  - Follow-up biopsy results and adjust regimen accordingly.     Renetta Chaudhari MD

## 2020-01-14 NOTE — Clinical Note
Biopsy device collected a biopsy of the RV under echocardiographic guidance. Biopsy sample count is 4.

## 2020-01-14 NOTE — DISCHARGE SUMMARY
[facility]  Discharge Note        Procedure date:01/14/2020     Discharge date: 01/14/2020        Procedure(s) (LRB):  BIOPSY, WITH US GUIDANCE (N/A)    OUTCOME: Patient tolerated treatment/procedure well without complication and is now ready for discharge.    DISPOSITION: Home or Self Care    FINAL DIAGNOSIS:  S/p OHTX    FOLLOWUP: In clinic          Deborah Navas   [facility] Medication Instructions MARLEY:[id number]    Printed on:01/14/20 5611   Medication Information                      amLODIPine (NORVASC) 10 MG tablet  Take 1 tablet (10 mg total) by mouth once daily.             aspirin (ECOTRIN) 81 MG EC tablet  Take 1 tablet (81 mg total) by mouth once daily.             atorvastatin (LIPITOR) 20 MG tablet  Take 1 tablet (20 mg total) by mouth once daily.             blood sugar diagnostic Strp  Use to test blood glucose 2 (two) times daily.             blood-glucose meter Misc  USE AS DIRECTED             calcium carbonate-vitamin D3 1,000 mg(2,500 mg)-800 unit Tab  Take 1 tablet by mouth once daily.             ferrous sulfate (FEOSOL) 325 mg (65 mg iron) Tab tablet  Take 1 tablet (325 mg total) by mouth 2 (two) times daily.             furosemide (LASIX) 80 MG tablet  Take 0.5 tablets (40 mg total) by mouth once daily.             gabapentin (NEURONTIN) 300 MG capsule  Take 1 capsule (300 mg total) by mouth every evening.             hydrALAZINE (APRESOLINE) 25 MG tablet  Take 3 tablets (75 mg total) by mouth every 8 (eight) hours.             lancets Misc  Use to test blood glucose 2 (two) times daily.             magnesium oxide (MAG-OX) 400 mg (241.3 mg magnesium) tablet  Take 1 tablet (400 mg total) by mouth once daily.             mirtazapine (REMERON) 30 MG tablet  Take 1 tablet (30 mg total) by mouth every evening.             mycophenolate (CELLCEPT) 250 mg Cap  Take 4 capsules (1,000 mg total) by mouth 2 (two) times daily.             opw transplant care kit  Welcome to [facility].  This is your transplant care kit.             oxyCODONE (ROXICODONE) 10 mg Tab immediate release tablet  Take 1 tablet (10 mg total) by mouth every 8 (eight) hours as needed.             pantoprazole (PROTONIX) 40 MG tablet  Take 1 tablet (40 mg total) by mouth once daily.             predniSONE (DELTASONE) 5 MG tablet  Take 2 tablets (10 mg total) by mouth once daily.             promethazine (PHENERGAN) 25 MG tablet  Take 1 tablet (25 mg total) by mouth every 6 (six) hours as needed for Nausea.             senna-docusate 8.6-50 mg (PERICOLACE) 8.6-50 mg per tablet  Take 2 tablets by mouth 2 (two) times daily as needed for Constipation.             SITagliptin (JANUVIA) 50 MG Tab  Take 1 tablet (50 mg total) by mouth once daily.             sulfamethoxazole-trimethoprim 400-80mg (BACTRIM,SEPTRA) 400-80 mg per tablet  Take 1 tablet by mouth once daily.             tacrolimus (PROGRAF) 1 MG Cap  Take 3 capsules (3 mg total) by mouth every morning AND 3 capsules (3 mg total) every evening.             valGANciclovir (VALCYTE) 450 mg Tab  Take 1 tablet (450 mg total) by mouth every other day.             venlafaxine (EFFEXOR-XR) 150 MG Cp24  Take 1 capsule (150 mg total) by mouth once daily.             zolpidem (AMBIEN) 5 MG Tab  Take 1 tablet (5 mg total) by mouth nightly as needed for insomnia.               Renetta Chaudhari MD

## 2020-01-14 NOTE — DISCHARGE INSTRUCTIONS

## 2020-01-14 NOTE — PROGRESS NOTES
Subjective:   Ms. Navas is a 49 y.o. year old White female who received a donation after brain death heart transplant on 12/16/19.      CMV status:   Donor: +  Recipient: -    HPI     Recently discharged from the hospital after heart transplantation. Renal faliure and tremors are issues. Other than that she is well    Lymphocele on right groin?    Prograf 3/3 cell cept 1000 mg bid and 10 prednisone Valcyte for 6 months     · Post cardiac transplantation study - OHTx 12/16/19  · Normal left ventricular systolic function. The estimated ejection fraction is 65%.  · Normal right ventricular systolic function.  · Normal LV diastolic function.  · Mild tricuspid regurgitation.  · The estimated PA systolic pressure is 35 mmHg.  · Normal central venous pressure (3 mmHg).  · No evident complications following endomyocardial biopsy.      Hospital Course: She was started on IV diuretic and low dobutamine due to concern for RV failure. Repeat RHC on 1/7 with upper normal filling pressures (RA 12, wedge 15) and high CO/CI. TTE revealed LVEF 65% and normal RV function, mild MR, mod-sev TR, IVC not seen; no pericardial effusion appreciated. She was switched to PO furosemide and her renal function improved slightly (last creat 2.3, from 3.2). She also had significant tremors on admission and theophylline levels were found to be severely elevated so it was discontinued, with some improvement. Tacrolimus dose was adjusted according to levels for a goal of 8, last level today is 5.6. BP was above goal and she was started on amlodipine and hydralazine with good response. EMB from 12/31 and 1/7 were both negative for Ab or cell-mediated rejection. There was also concern for increasing size of R groin hematoma that was first noted post-transplant, CTS evaluated patient and recommended conservative management and observation. Patient is currently stable to be discharged home and follow-up as outpatient; she has an appointment for her  "next EMB on 1/14/2020          0 Review of Systems   Constitution: Negative for chills, decreased appetite, diaphoresis, fever, malaise/fatigue, night sweats, weight gain and weight loss.   Cardiovascular: Negative for chest pain, claudication, cyanosis, dyspnea on exertion, irregular heartbeat, leg swelling, near-syncope, orthopnea and palpitations.   Respiratory: Negative for cough, hemoptysis, shortness of breath, sleep disturbances due to breathing, snoring, sputum production and wheezing.    Gastrointestinal: Negative for abdominal pain and diarrhea.   Neurological: Negative for weakness.       Objective:   Blood pressure 123/64, pulse 83, height 5' 7" (1.702 m), weight 101.9 kg (224 lb 10.4 oz).body mass index is 35.18 kg/m².    Physical Exam   Constitutional: She is oriented to person, place, and time. She appears well-developed and well-nourished.   HENT:   Head: Normocephalic and atraumatic.   Eyes: Pupils are equal, round, and reactive to light. Conjunctivae and EOM are normal.   Neck: Neck supple. No JVD present. No tracheal deviation present. No thyromegaly present.   Cardiovascular: Normal rate, regular rhythm and normal heart sounds. Exam reveals no gallop and no friction rub.   No murmur heard.  Pulmonary/Chest: Effort normal and breath sounds normal. No respiratory distress. She has no wheezes. She has no rales. She exhibits no tenderness.   Abdominal: Soft. Bowel sounds are normal. She exhibits no distension and no mass. There is no tenderness. There is no rebound and no guarding.   Musculoskeletal: Normal range of motion. She exhibits no edema or tenderness.   Lymphadenopathy:     She has no cervical adenopathy.   Neurological: She is alert and oriented to person, place, and time. She has normal reflexes. No cranial nerve deficit. She exhibits normal muscle tone. Coordination normal.   Skin: Skin is warm and dry.   Psychiatric: She has a normal mood and affect. Her behavior is normal. Thought " content normal.       Lab Results   Component Value Date    WBC 6.28 01/14/2020    HGB 9.2 (L) 01/14/2020    HCT 31.5 (L) 01/14/2020    MCV 91 01/14/2020     (H) 01/14/2020    CO2 25 01/14/2020    CREATININE 2.7 (H) 01/14/2020    CALCIUM 9.4 01/14/2020    ALBUMIN 3.3 (L) 01/14/2020    AST 10 01/14/2020     (H) 01/14/2020    ALT 11 01/14/2020       Lab Results   Component Value Date    INR 1.2 12/23/2019    INR 1.4 (H) 12/22/2019    INR 1.3 (H) 12/21/2019       BNP   Date Value Ref Range Status   01/14/2020 339 (H) 0 - 99 pg/mL Final     Comment:     Values of less than 100 pg/ml are consistent with non-CHF populations.   01/02/2020 594 (H) 0 - 99 pg/mL Final     Comment:     Values of less than 100 pg/ml are consistent with non-CHF populations.   12/31/2019 510 (H) 0 - 99 pg/mL Final     Comment:     Values of less than 100 pg/ml are consistent with non-CHF populations.       LD   Date Value Ref Range Status   12/16/2019 240 110 - 260 U/L Final     Comment:     Results are increased in hemolyzed samples.   12/15/2019 246 110 - 260 U/L Final     Comment:     Results are increased in hemolyzed samples.   12/14/2019 257 110 - 260 U/L Final     Comment:     Results are increased in hemolyzed samples.       Tacrolimus Lvl   Date Value Ref Range Status   01/14/2020 8.5 5.0 - 15.0 ng/mL Final     Comment:     Testing performed by Liquid Chromatography-Tandem  Mass Spectrometry (LC-MS/MS).  This test was developed and its performance characteristics  determined by [facility], Department of Pathology  and Laboratory Medicine in a manner consistent with CLIA  requirements. It has not been cleared or approved by the [place]  Food and Drug Administration.  This test is used for clinical   purposes.  It should not be regarded as investigational or for  research.       No results found for: SIROLIMUS  No results found for: CYCLOSPORINE    No results found for this or any previous visit.  No results found for  this or any previous visit.    Labs were reviewed with the patient.    Assessment:     1. Status post heart transplant    2. Stage 3 chronic kidney disease    3. Immunosuppression    4. 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 Adverse effect of adrenal cortical steroids, sequela        Plan:   Routine follow up. Progressing well Tremor are an issue        Creatinine is high with low PA pressures (DC lasix). Increase fluid intake     Return instructions as set forth by post transplant schedule or as needed:    Clinic: Return for labs and/or biopsy weekly the first month, every two weeks during month 2 and then monthly for the first year at the provider or coordinator's discretion. During the second year, return to clinic every 3 months. Post transplant year 3-5 return every 6 months. There will be a comprehensive post transplant evaluation every year that may include LHC/RHC/biopsy, stress test, echo, CXR, and other health screening exams.    In addition to the clinical assessment, I have ordered Allomap testing for this patient to assist in identification of moderate/severe acute cellular rejection (ACR) in a pt with stable Allograft function instead of endomyocardial biopsy.     Patient is reminded to call with any health changes as these can be early signs of transplant complications. Patient is advised to make sure any new medications or changes of old medications are discussed with a pharmacist or physician knowledgeable with transplant to avoid rejection/drug toxicity related to significant drug interactions.    UNOS Patient Status  Functional Status: 80% - Normal activity with effort: some symptoms of disease  Physical Capacity: Limited Mobility  Working for Income: No  If no, reason not working: Unknown    Johny Zarate MD

## 2020-01-14 NOTE — PROGRESS NOTES
Patient seen and examined. Patient is progressively increasing activity. No significant complaints.     Sternum: stable, incision CDI  Chest xray: Acceptable post op chest  Sutures removed     Assessment:  S/P OHT 12/16/19     Plan:  No scheduled appointment, RTC prn

## 2020-01-15 LAB
FINAL PATHOLOGIC DIAGNOSIS: NORMAL
GROSS: NORMAL
MICROSCOPIC EXAM: NORMAL

## 2020-01-16 ENCOUNTER — CONFERENCE (OUTPATIENT)
Dept: TRANSPLANT | Facility: CLINIC | Age: 50
End: 2020-01-16

## 2020-01-16 DIAGNOSIS — Z94.1 HEART REPLACED BY TRANSPLANT: Primary | ICD-10-CM

## 2020-01-17 ENCOUNTER — LAB VISIT (OUTPATIENT)
Dept: LAB | Facility: HOSPITAL | Age: 50
End: 2020-01-17
Payer: COMMERCIAL

## 2020-01-17 DIAGNOSIS — Z94.1 HEART REPLACED BY TRANSPLANT: ICD-10-CM

## 2020-01-17 LAB
ALBUMIN SERPL BCP-MCNC: 3.4 G/DL (ref 3.5–5.2)
ANION GAP SERPL CALC-SCNC: 13 MMOL/L (ref 8–16)
BUN SERPL-MCNC: 32 MG/DL (ref 6–20)
CALCIUM SERPL-MCNC: 9.3 MG/DL (ref 8.7–10.5)
CHLORIDE SERPL-SCNC: 109 MMOL/L (ref 95–110)
CO2 SERPL-SCNC: 22 MMOL/L (ref 23–29)
CREAT SERPL-MCNC: 2.8 MG/DL (ref 0.5–1.4)
EST. GFR  (AFRICAN AMERICAN): 21.9 ML/MIN/1.73 M^2
EST. GFR  (NON AFRICAN AMERICAN): 19 ML/MIN/1.73 M^2
GLUCOSE SERPL-MCNC: 117 MG/DL (ref 70–110)
PHOSPHATE SERPL-MCNC: 3.8 MG/DL (ref 2.7–4.5)
POTASSIUM SERPL-SCNC: 4 MMOL/L (ref 3.5–5.1)
SODIUM SERPL-SCNC: 144 MMOL/L (ref 136–145)

## 2020-01-17 PROCEDURE — 86644 CMV ANTIBODY: CPT

## 2020-01-17 PROCEDURE — 36415 COLL VENOUS BLD VENIPUNCTURE: CPT

## 2020-01-17 PROCEDURE — 80069 RENAL FUNCTION PANEL: CPT

## 2020-01-20 ENCOUNTER — HOSPITAL ENCOUNTER (OUTPATIENT)
Dept: RADIOLOGY | Facility: HOSPITAL | Age: 50
Discharge: HOME | DRG: 683 | End: 2020-01-20
Attending: INTERNAL MEDICINE
Payer: COMMERCIAL

## 2020-01-20 ENCOUNTER — TELEPHONE (OUTPATIENT)
Dept: TRANSPLANT | Facility: CLINIC | Age: 50
End: 2020-01-20

## 2020-01-20 ENCOUNTER — HOSPITAL ENCOUNTER (INPATIENT)
Facility: HOSPITAL | Age: 50
LOS: 5 days | Discharge: HOME | DRG: 683 | End: 2020-01-27
Attending: INTERNAL MEDICINE | Admitting: INTERNAL MEDICINE
Payer: COMMERCIAL

## 2020-01-20 DIAGNOSIS — R11.10 VOMITING, INTRACTABILITY OF VOMITING NOT SPECIFIED, PRESENCE OF NAUSEA NOT SPECIFIED, UNSPECIFIED VOMITING TYPE: ICD-10-CM

## 2020-01-20 DIAGNOSIS — I42.5 RESTRICTIVE CARDIOMYOPATHY: ICD-10-CM

## 2020-01-20 DIAGNOSIS — R06.02 SOB (SHORTNESS OF BREATH) ON EXERTION: Primary | ICD-10-CM

## 2020-01-20 DIAGNOSIS — Z94.1 STATUS POST HEART TRANSPLANT: ICD-10-CM

## 2020-01-20 DIAGNOSIS — R55 POSTURAL DIZZINESS WITH PRESYNCOPE: ICD-10-CM

## 2020-01-20 DIAGNOSIS — Z99.81 HYPOXEMIA REQUIRING SUPPLEMENTAL OXYGEN: Primary | ICD-10-CM

## 2020-01-20 DIAGNOSIS — N18.30 STAGE 3 CHRONIC KIDNEY DISEASE: ICD-10-CM

## 2020-01-20 DIAGNOSIS — Z94.1 HEART TRANSPLANTED: ICD-10-CM

## 2020-01-20 DIAGNOSIS — R42 POSTURAL DIZZINESS WITH PRESYNCOPE: ICD-10-CM

## 2020-01-20 DIAGNOSIS — R09.02 HYPOXEMIA REQUIRING SUPPLEMENTAL OXYGEN: Primary | ICD-10-CM

## 2020-01-20 DIAGNOSIS — D84.9 IMMUNOSUPPRESSION: ICD-10-CM

## 2020-01-20 LAB
BACTERIA #/AREA URNS AUTO: ABNORMAL /HPF
BILIRUB UR QL STRIP: NEGATIVE
CLARITY UR REFRACT.AUTO: CLEAR
COLOR UR AUTO: ABNORMAL
GLUCOSE UR QL STRIP: NEGATIVE
HGB UR QL STRIP: NEGATIVE
KETONES UR QL STRIP: NEGATIVE
LEUKOCYTE ESTERASE UR QL STRIP: ABNORMAL
MICROSCOPIC COMMENT: ABNORMAL
NITRITE UR QL STRIP: NEGATIVE
PH UR STRIP: 6 [PH] (ref 5–8)
POCT GLUCOSE: 182 MG/DL (ref 70–110)
PROT UR QL STRIP: NEGATIVE
RBC #/AREA URNS AUTO: 1 /HPF (ref 0–4)
SP GR UR STRIP: 1 (ref 1–1.03)
SQUAMOUS #/AREA URNS AUTO: 3 /HPF
URN SPEC COLLECT METH UR: ABNORMAL
WBC #/AREA URNS AUTO: 6 /HPF (ref 0–5)

## 2020-01-20 PROCEDURE — 96374 THER/PROPH/DIAG INJ IV PUSH: CPT

## 2020-01-20 PROCEDURE — G0378 HOSPITAL OBSERVATION PER HR: HCPCS

## 2020-01-20 PROCEDURE — 71046 XR CHEST PA AND LATERAL: ICD-10-PCS | Mod: 26,,, | Performed by: RADIOLOGY

## 2020-01-20 PROCEDURE — 71046 X-RAY EXAM CHEST 2 VIEWS: CPT | Mod: TC,FY

## 2020-01-20 PROCEDURE — 99220 PR INITIAL OBSERVATION CARE,LEVL III: ICD-10-PCS | Mod: ,,, | Performed by: INTERNAL MEDICINE

## 2020-01-20 PROCEDURE — 81001 URINALYSIS AUTO W/SCOPE: CPT

## 2020-01-20 PROCEDURE — 99220 PR INITIAL OBSERVATION CARE,LEVL III: CPT | Mod: ,,, | Performed by: INTERNAL MEDICINE

## 2020-01-20 PROCEDURE — 71046 X-RAY EXAM CHEST 2 VIEWS: CPT | Mod: 26,,, | Performed by: RADIOLOGY

## 2020-01-20 PROCEDURE — G0379 DIRECT REFER HOSPITAL OBSERV: HCPCS

## 2020-01-20 PROCEDURE — 25000003 PHARM REV CODE 250: Performed by: NURSE PRACTITIONER

## 2020-01-20 PROCEDURE — 63600175 PHARM REV CODE 636 W HCPCS: Performed by: NURSE PRACTITIONER

## 2020-01-20 RX ORDER — VENLAFAXINE HYDROCHLORIDE 37.5 MG/1
150 CAPSULE, EXTENDED RELEASE ORAL DAILY
Status: DISCONTINUED | OUTPATIENT
Start: 2020-01-21 | End: 2020-01-27 | Stop reason: HOSPADM

## 2020-01-20 RX ORDER — PREDNISONE 10 MG/1
10 TABLET ORAL DAILY
Status: DISCONTINUED | OUTPATIENT
Start: 2020-01-21 | End: 2020-01-27 | Stop reason: HOSPADM

## 2020-01-20 RX ORDER — FERROUS SULFATE 325(65) MG
325 TABLET, DELAYED RELEASE (ENTERIC COATED) ORAL 2 TIMES DAILY
Status: DISCONTINUED | OUTPATIENT
Start: 2020-01-20 | End: 2020-01-27 | Stop reason: HOSPADM

## 2020-01-20 RX ORDER — LANOLIN ALCOHOL/MO/W.PET/CERES
400 CREAM (GRAM) TOPICAL DAILY
Status: DISCONTINUED | OUTPATIENT
Start: 2020-01-21 | End: 2020-01-27 | Stop reason: HOSPADM

## 2020-01-20 RX ORDER — GABAPENTIN 300 MG/1
300 CAPSULE ORAL NIGHTLY
Status: DISCONTINUED | OUTPATIENT
Start: 2020-01-20 | End: 2020-01-27 | Stop reason: HOSPADM

## 2020-01-20 RX ORDER — VALGANCICLOVIR 450 MG/1
450 TABLET, FILM COATED ORAL EVERY OTHER DAY
Status: DISCONTINUED | OUTPATIENT
Start: 2020-01-21 | End: 2020-01-26

## 2020-01-20 RX ORDER — FUROSEMIDE 10 MG/ML
60 INJECTION INTRAMUSCULAR; INTRAVENOUS 2 TIMES DAILY
Status: DISCONTINUED | OUTPATIENT
Start: 2020-01-20 | End: 2020-01-21

## 2020-01-20 RX ORDER — MYCOPHENOLATE MOFETIL 250 MG/1
500 CAPSULE ORAL 2 TIMES DAILY
Status: DISCONTINUED | OUTPATIENT
Start: 2020-01-20 | End: 2020-01-27 | Stop reason: HOSPADM

## 2020-01-20 RX ORDER — ONDANSETRON 8 MG/1
8 TABLET, ORALLY DISINTEGRATING ORAL EVERY 6 HOURS PRN
Status: DISCONTINUED | OUTPATIENT
Start: 2020-01-20 | End: 2020-01-27 | Stop reason: HOSPADM

## 2020-01-20 RX ORDER — FERROUS SULFATE, DRIED 160(50) MG
2 TABLET, EXTENDED RELEASE ORAL DAILY
Status: DISCONTINUED | OUTPATIENT
Start: 2020-01-21 | End: 2020-01-27 | Stop reason: HOSPADM

## 2020-01-20 RX ORDER — ZOLPIDEM TARTRATE 5 MG/1
5 TABLET ORAL NIGHTLY PRN
Status: DISCONTINUED | OUTPATIENT
Start: 2020-01-20 | End: 2020-01-27 | Stop reason: HOSPADM

## 2020-01-20 RX ORDER — AMLODIPINE BESYLATE 10 MG/1
10 TABLET ORAL DAILY
Status: DISCONTINUED | OUTPATIENT
Start: 2020-01-21 | End: 2020-01-27

## 2020-01-20 RX ORDER — TACROLIMUS 1 MG/1
3 CAPSULE ORAL 2 TIMES DAILY
Status: DISCONTINUED | OUTPATIENT
Start: 2020-01-20 | End: 2020-01-27 | Stop reason: HOSPADM

## 2020-01-20 RX ORDER — ATORVASTATIN CALCIUM 20 MG/1
20 TABLET, FILM COATED ORAL DAILY
Status: DISCONTINUED | OUTPATIENT
Start: 2020-01-21 | End: 2020-01-27 | Stop reason: HOSPADM

## 2020-01-20 RX ORDER — SULFAMETHOXAZOLE AND TRIMETHOPRIM 400; 80 MG/1; MG/1
1 TABLET ORAL DAILY
Status: DISCONTINUED | OUTPATIENT
Start: 2020-01-21 | End: 2020-01-20

## 2020-01-20 RX ORDER — PANTOPRAZOLE SODIUM 40 MG/1
40 TABLET, DELAYED RELEASE ORAL DAILY
Status: DISCONTINUED | OUTPATIENT
Start: 2020-01-21 | End: 2020-01-27 | Stop reason: HOSPADM

## 2020-01-20 RX ORDER — ASPIRIN 81 MG/1
81 TABLET ORAL DAILY
Status: DISCONTINUED | OUTPATIENT
Start: 2020-01-21 | End: 2020-01-27 | Stop reason: HOSPADM

## 2020-01-20 RX ORDER — AMOXICILLIN 250 MG
2 CAPSULE ORAL 2 TIMES DAILY PRN
Status: DISCONTINUED | OUTPATIENT
Start: 2020-01-20 | End: 2020-01-27 | Stop reason: HOSPADM

## 2020-01-20 RX ORDER — OXYCODONE HYDROCHLORIDE 10 MG/1
10 TABLET ORAL EVERY 8 HOURS PRN
Status: DISCONTINUED | OUTPATIENT
Start: 2020-01-20 | End: 2020-01-27 | Stop reason: HOSPADM

## 2020-01-20 RX ORDER — MIRTAZAPINE 15 MG/1
30 TABLET, FILM COATED ORAL NIGHTLY
Status: DISCONTINUED | OUTPATIENT
Start: 2020-01-20 | End: 2020-01-24

## 2020-01-20 RX ADMIN — MYCOPHENOLATE MOFETIL 500 MG: 250 CAPSULE ORAL at 07:01

## 2020-01-20 RX ADMIN — FERROUS SULFATE TAB EC 325 MG (65 MG FE EQUIVALENT) 325 MG: 325 (65 FE) TABLET DELAYED RESPONSE at 07:01

## 2020-01-20 RX ADMIN — ZOLPIDEM TARTRATE 5 MG: 5 TABLET, COATED ORAL at 10:01

## 2020-01-20 RX ADMIN — GABAPENTIN 300 MG: 300 CAPSULE ORAL at 07:01

## 2020-01-20 RX ADMIN — FUROSEMIDE 60 MG: 10 INJECTION, SOLUTION INTRAMUSCULAR; INTRAVENOUS at 05:01

## 2020-01-20 RX ADMIN — MIRTAZAPINE 30 MG: 15 TABLET, FILM COATED ORAL at 07:01

## 2020-01-20 RX ADMIN — TACROLIMUS 3 MG: 1 CAPSULE ORAL at 05:01

## 2020-01-20 RX ADMIN — ONDANSETRON 8 MG: 8 TABLET, ORALLY DISINTEGRATING ORAL at 01:01

## 2020-01-20 NOTE — TELEPHONE ENCOUNTER
Met with pt and caregiver, Flavia, after pt repeated lab this morning for elevated cr.  Pt seems pale and more anxious than usual. Said she had a bad weekend. Her main symptom is shortness of breath particularly with exertion - even just rising from seated position. Stayed in bed most of the weekend. Has nausea but denies vomiting, diarrhea. HR was in the 90's with normal BP's according to caregiver.   After discussing with Dr. Bryant and BRIAN, Fellow, pt will get chest xray to check on pleural effusions. I asked that pt return to 3rd floor clinic when completed.    Pt returned and placed in room. Started to feel nauseated and gagging. Stated she felt full even though she has not eaten much over the weekend. Dr. Chaudhari examined her and ordered her to be admitted for OBS. Still pending CXR and Tac level. Pt took Tac 3 mg, Pred 10 mg,  mg (decreased dose per Watson MIMS), Hydralazine 75 mg and Amlodipine 10 mg and Effexor 150 mg. Held Valcyte, Magnesium, MVI, ASA.     Notified Mid-level team for admission and called in admit.

## 2020-01-20 NOTE — SUBJECTIVE & OBJECTIVE
Past Medical History:   Diagnosis Date    Fractures     History of ventricular fibrillation 9/4/2019    History of ventricular tachycardia 9/4/2019    Hyperlipidemia     Migraine     Multinodular goiter 9/5/2019    Osteoarthritis        Past Surgical History:   Procedure Laterality Date    BACK SURGERY      2007    BIOPSY WITH ULTRASOUND GUIDANCE N/A 12/31/2019    Procedure: BIOPSY, WITH US GUIDANCE;  Surgeon: Eric Antunez Jr., MD;  Location: [facility] CATH LAB;  Service: Cardiology;  Laterality: N/A;    BIOPSY WITH ULTRASOUND GUIDANCE N/A 1/7/2020    Procedure: BIOPSY, WITH US GUIDANCE;  Surgeon: Renetta Chaudhari MD;  Location: [facility] CATH LAB;  Service: Cardiology;  Laterality: N/A;    BIOPSY WITH ULTRASOUND GUIDANCE N/A 1/14/2020    Procedure: BIOPSY, WITH US GUIDANCE;  Surgeon: Renetta Chaudhari MD;  Location: [facility] CATH LAB;  Service: Cardiology;  Laterality: N/A;    BONE GRAFT Left     from Left hip to Left FA    eardrum reconstruction  1980    ELBOW SURGERY Left [id number]    FOREARM FRACTURE SURGERY Bilateral [id number]    multiple surgeries    HEART TRANSPLANT N/A 12/16/2019    Procedure: TRANSPLANT, HEART;  Surgeon: Talha Nuñez MD;  Location: [address];  Service: Cardiothoracic;  Laterality: N/A;    INSERTION OF GRAFT TO PERICARDIUM  9/10/2019    Procedure: INSERTION, GRAFT, PERICARDIUM;  Surgeon: Shar Walden MD;  Location: [facility] OR [address] [place] [address];  Service: Cardiovascular;;    INSERTION OF IMPLANTABLE CARDIOVERTER-DEFIBRILLATOR (ICD) GENERATOR WITH TWO EXISTING LEADS      INSERTION OF PACEMAKER Left     LEFT VENTRICULAR ASSIST DEVICE N/A 9/10/2019    Procedure: INSERTION-LEFT VENTRICULAR ASSIST DEVICE;  Surgeon: Shar Walden MD;  Location: [facility] OR [address] [place] [address];  Service: Cardiovascular;  Laterality: N/A;  DT HM3     LUMBAR FUSION  2007    L4-L5    RIGHT HEART CATHETERIZATION Right 9/4/2019    Procedure: INSERTION, CATHETER, RIGHT HEART;  Surgeon: Vi Bryant MD;  Location: [facility]  CATH LAB;  Service: Cardiology;  Laterality: Right;    RIGHT HEART CATHETERIZATION N/A 11/18/2019    Procedure: INSERTION, CATHETER, RIGHT HEART;  Surgeon: Eric Antunez Jr., MD;  Location: [facility] CATH LAB;  Service: Cardiology;  Laterality: N/A;    RIGHT HEART CATHETERIZATION Right 12/31/2019    Procedure: INSERTION, CATHETER, RIGHT HEART;  Surgeon: Eric Antunez Jr., MD;  Location: [facility] CATH LAB;  Service: Cardiology;  Laterality: Right;    RIGHT HEART CATHETERIZATION Right 1/7/2020    Procedure: INSERTION, CATHETER, RIGHT HEART;  Surgeon: Renetta Chaudhari MD;  Location: [facility] CATH LAB;  Service: Cardiology;  Laterality: Right;    SINUS SURGERY Right 1994    with lymph nodes    STERNAL WOUND CLOSURE  9/10/2019    Procedure: CLOSURE, WOUND, STERNUM;  Surgeon: Shar Walden MD;  Location: [facility] OR [facility] FLR;  Service: Cardiovascular;;    TEMPOROMANDIBULAR JOINT SURGERY Right 1988    TONSILLECTOMY      TREATMENT OF CARDIAC ARRHYTHMIA N/A 9/24/2019    Procedure: CARDIOVERSION;  Surgeon: Moe Barrera MD;  Location: [facility] EP LAB;  Service: Cardiology;  Laterality: N/A;  AF, DCCV/NADINE, anes, DM, Rm 3073    TYMPANOSTOMY TUBE PLACEMENT  1971- 1979    multiple tube placements       Review of patient's allergies indicates:   Allergen Reactions    Adhesive Blisters     Reaction to area in chest and up only    Codeine Itching       Current Facility-Administered Medications   Medication    [START ON 1/21/2020] amLODIPine tablet 10 mg    [START ON 1/21/2020] aspirin EC tablet 81 mg    [START ON 1/21/2020] atorvastatin tablet 20 mg    [START ON 1/21/2020] calcium-vitamin D3 500 mg(1,250mg) -200 unit per tablet 2 tablet    ferrous sulfate EC tablet 325 mg    gabapentin capsule 300 mg    [START ON 1/21/2020] magnesium oxide tablet 400 mg    mirtazapine tablet 30 mg    mycophenolate capsule 500 mg    ondansetron disintegrating tablet 8 mg    oxyCODONE immediate release tablet Tab 10 mg    [START ON  1/21/2020] pantoprazole EC tablet 40 mg    [START ON 1/21/2020] predniSONE tablet 10 mg    promethazine (PHENERGAN) 12.5 mg in dextrose 5 % 50 mL IVPB    senna-docusate 8.6-50 mg per tablet 2 tablet    [START ON 1/21/2020] SITagliptin tablet 50 mg    [START ON 1/21/2020] sulfamethoxazole-trimethoprim 400-80mg per tablet 1 tablet    tacrolimus capsule 3 mg    [START ON 1/21/2020] valGANciclovir tablet 450 mg    [START ON 1/21/2020] venlafaxine 24 hr capsule 150 mg    zolpidem tablet 5 mg     Family History     Problem Relation (Age of Onset)    Broken bones Maternal Grandmother    Cancer Paternal Grandmother    Dislocations Maternal Grandmother    Heart failure Paternal Grandfather, Maternal Grandfather    Migraines Mother, Maternal Grandmother, Paternal Grandmother, Paternal Grandfather, Maternal Grandfather    Obesity Paternal Grandmother    Osteoarthritis Mother, Maternal Grandmother, Paternal Grandmother, Paternal Grandfather, Maternal Grandfather, Father    Osteoporosis Maternal Grandmother    Scoliosis Maternal Grandmother    Stroke Father        Tobacco Use    Smoking status: Never Smoker    Smokeless tobacco: Never Used   Substance and Sexual Activity    Alcohol use: No    Drug use: No    Sexual activity: Not Currently     Review of Systems   Constitutional: Positive for fatigue. Negative for fever.   HENT: Negative.    Eyes: Negative.    Respiratory: Positive for shortness of breath.    Cardiovascular: Negative.    Gastrointestinal: Positive for nausea and vomiting. Negative for diarrhea.   Endocrine: Negative.    Genitourinary: Negative.    Musculoskeletal: Negative.    Skin: Negative.    Allergic/Immunologic: Negative.    Neurological: Positive for tremors.   Hematological: Negative.    Psychiatric/Behavioral: Negative.    All other systems reviewed and are negative.    Objective:     Vital Signs (Most Recent):  Temp: 98.1 °F (36.7 °C) (01/20/20 1205)  Pulse: 90 (01/20/20 1205)  Resp: 20  (01/20/20 1205)  BP: 135/60 (01/20/20 1205)  SpO2: 98 % (01/20/20 1205) Vital Signs (24h Range):  Temp:  [98.1 °F (36.7 °C)] 98.1 °F (36.7 °C)  Pulse:  [90] 90  Resp:  [20] 20  SpO2:  [98 %] 98 %  BP: (135)/(60) 135/60     No data found.  There is no height or weight on file to calculate BMI.    No intake or output data in the 24 hours ending 01/20/20 1433    Physical Exam   Constitutional: She is oriented to person, place, and time. She appears well-developed and well-nourished.   HENT:   Head: Normocephalic.   Eyes: Pupils are equal, round, and reactive to light.   Neck: Normal range of motion. Neck supple. JVD present.   Cardiovascular: Normal rate and regular rhythm.   Pulmonary/Chest: Effort normal and breath sounds normal.   Abdominal: Soft. Bowel sounds are normal.   Musculoskeletal: Normal range of motion.   Neurological: She is alert and oriented to person, place, and time.   Skin: Skin is warm and dry.   Psychiatric: She has a normal mood and affect. Her behavior is normal.   Nursing note and vitals reviewed.    Significant Labs:  CBC:  Recent Labs   Lab 01/14/20  0822 01/20/20  0921   WBC 6.28 7.23   RBC 3.45* 3.53*   HGB 9.2* 9.5*   HCT 31.5* 31.8*   * 418*   MCV 91 90   MCH 26.7* 26.9*   MCHC 29.2* 29.9*     BNP:  Recent Labs   Lab 01/14/20  0822 01/20/20  0921   * 423*     CMP:  Recent Labs   Lab 01/14/20  0822 01/17/20  0850 01/20/20  0921   * 117* 140*   CALCIUM 9.4 9.3 9.3   ALBUMIN 3.3* 3.4* 3.5   PROT 6.6  --  6.5    144 143   K 3.6 4.0 3.9   CO2 25 22* 20*    109 110   BUN 33* 32* 26*   CREATININE 2.7* 2.8* 2.3*   ALKPHOS 91  --  87   ALT 11  --  10   AST 10  --  9*   BILITOT 0.4  --  0.4      Coagulation:   No results for input(s): PT, INR, APTT in the last 168 hours.  LDH:  No results for input(s): LDH in the last 72 hours.  Microbiology:  Microbiology Results (last 7 days)     ** No results found for the last 168 hours. **        I have reviewed all pertinent  labs within the past 24 hours.    Diagnostic Results:  I have reviewed all pertinent imaging results/findings within the past 24 hours.

## 2020-01-20 NOTE — ASSESSMENT & PLAN NOTE
-Transplant Date 12/16/2019. HM 3 implanted 9/10/19 removed during transplant.    -Transplanted by: Dr. Nuñez.  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  -Prednisone 10 mg daily,  BID (decreased); Prograf 3/3.   -Biopsy neg 1/7 and 1/14.  -Prophylaxis-  Valcyte. Hold Bactrim with nausea for now.   -Will repeat echo. Will gently diurese with IVP Lasix.

## 2020-01-20 NOTE — HPI
50yo F s/p OHTx on 12/16/2019 for NICM/ HFrEF with HM3 on 9/2019, removed at time of transplant, HLD, obesity, and OA, who presented for worsening nausea and continued dyspnea on exertion. The pathology of native heart revealed non-caseating granulomas typical of sarcoidosis. Was recently admitted with similar symptoms/GLO in which she was diuresed and BP medications adjusted with improvement in renal function.  RHC on 1/7 with RA 12, wedge 15 and high CO/CI. TTE revealed LVEF 65% and normal RV function, mild MR, mod-sev TR, IVC not seen; no pericardial effusion appreciated. She also had significant tremors on admission and theophylline levels were found to be severely elevated so it was discontinued, with some improvement. EMB from 12/31 and 1/7 were both negative for Ab or cell-mediated rejection. She states that she did OK when initially discharged but has been getting progressively more nauseous over the last few days. Denies any sick contacts. No diarrhea. Vomiting occasionally but mostly dry heaves. She also still complains of dyspnea with minimal exertion which has not imrpoved since surgery.

## 2020-01-20 NOTE — ASSESSMENT & PLAN NOTE
-Anti emetics prn.  -CMV (-) from 1/14. Will recheck.  -Will decrease MMF dose.  -Hold Bactrim as well for now.

## 2020-01-20 NOTE — H&P
[facility]  Heart Transplant  H&P    Patient Name: Deborah Navas  MRN: [id number]  Admission Date: 1/20/2020  Attending Physician: Renetta Chaudhari MD  Primary Care Provider: Valeria Verma MD  Principal Problem:Nausea    Subjective:     History of Present Illness:  48yo F s/p OHTx on 12/16/2019 for NICM/ HFrEF with HM3 on 9/2019, removed at time of transplant, HLD, obesity, and OA, who presented for worsening nausea and continued dyspnea on exertion. The pathology of native heart revealed non-caseating granulomas typical of sarcoidosis. Was recently admitted with similar symptoms/GLO in which she was diuresed and BP medications adjusted with improvement in renal function.  RHC on 1/7 with RA 12, wedge 15 and high CO/CI. TTE revealed LVEF 65% and normal RV function, mild MR, mod-sev TR, IVC not seen; no pericardial effusion appreciated. She also had significant tremors on admission and theophylline levels were found to be severely elevated so it was discontinued, with some improvement. EMB from 12/31 and 1/7 were both negative for Ab or cell-mediated rejection. She states that she did OK when initially discharged but has been getting progressively more nauseous over the last few days. Denies any sick contacts. No diarrhea. Vomiting occasionally but mostly dry heaves. She also still complains of dyspnea with minimal exertion which has not imrpoved since surgery.     Past Medical History:   Diagnosis Date    Fractures     History of ventricular fibrillation 9/4/2019    History of ventricular tachycardia 9/4/2019    Hyperlipidemia     Migraine     Multinodular goiter 9/5/2019    Osteoarthritis        Past Surgical History:   Procedure Laterality Date    BACK SURGERY      2007    BIOPSY WITH ULTRASOUND GUIDANCE N/A 12/31/2019    Procedure: BIOPSY, WITH US GUIDANCE;  Surgeon: Eric Antunez Jr., MD;  Location: [facility] CATH LAB;  Service: Cardiology;  Laterality: N/A;    BIOPSY WITH  ULTRASOUND GUIDANCE N/A 1/7/2020    Procedure: BIOPSY, WITH US GUIDANCE;  Surgeon: Renetta Chaudhari MD;  Location: [facility] CATH LAB;  Service: Cardiology;  Laterality: N/A;    BIOPSY WITH ULTRASOUND GUIDANCE N/A 1/14/2020    Procedure: BIOPSY, WITH US GUIDANCE;  Surgeon: Renetta Chaudhari MD;  Location: [facility] CATH LAB;  Service: Cardiology;  Laterality: N/A;    BONE GRAFT Left     from Left hip to Left FA    eardrum reconstruction  1980    ELBOW SURGERY Left [id number]    FOREARM FRACTURE SURGERY Bilateral [id number]    multiple surgeries    HEART TRANSPLANT N/A 12/16/2019    Procedure: TRANSPLANT, HEART;  Surgeon: Talha Nuñez MD;  [facility];  Service: Cardiothoracic;  Laterality: N/A;    INSERTION OF GRAFT TO PERICARDIUM  9/10/2019    Procedure: INSERTION, GRAFT, PERICARDIUM;  Surgeon: Shar Walden MD;  [facility];  Service: Cardiovascular;;    INSERTION OF IMPLANTABLE CARDIOVERTER-DEFIBRILLATOR (ICD) GENERATOR WITH TWO EXISTING LEADS      INSERTION OF PACEMAKER Left     LEFT VENTRICULAR ASSIST DEVICE N/A 9/10/2019    Procedure: INSERTION-LEFT VENTRICULAR ASSIST DEVICE;  Surgeon: Shar Walden MD;  [facility];  Service: Cardiovascular;  Laterality: N/A;  DT HM3     LUMBAR FUSION  2007    L4-L5    RIGHT HEART CATHETERIZATION Right 9/4/2019    Procedure: INSERTION, CATHETER, RIGHT HEART;  Surgeon: Vi Bryant MD;  Location: [facility] CATH LAB;  Service: Cardiology;  Laterality: Right;    RIGHT HEART CATHETERIZATION N/A 11/18/2019    Procedure: INSERTION, CATHETER, RIGHT HEART;  Surgeon: Eric Antunez Jr., MD;  Location: [facility] CATH LAB;  Service: Cardiology;  Laterality: N/A;    RIGHT HEART CATHETERIZATION Right 12/31/2019    Procedure: INSERTION, CATHETER, RIGHT HEART;  Surgeon: Eric Antunez Jr., MD;  Location: [facility] CATH LAB;  Service: Cardiology;  Laterality: Right;    RIGHT HEART CATHETERIZATION Right 1/7/2020    Procedure: INSERTION,  CATHETER, RIGHT HEART;  Surgeon: Renetta Chaudhari MD;  Location: [facility] CATH LAB;  Service: Cardiology;  Laterality: Right;    SINUS SURGERY Right 1994    with lymph nodes    STERNAL WOUND CLOSURE  9/10/2019    Procedure: CLOSURE, WOUND, STERNUM;  Surgeon: Shar Walden MD;  Location: [facility] OR [facility] FLR;  Service: Cardiovascular;;    TEMPOROMANDIBULAR JOINT SURGERY Right 1988    TONSILLECTOMY      TREATMENT OF CARDIAC ARRHYTHMIA N/A 9/24/2019    Procedure: CARDIOVERSION;  Surgeon: Moe Barrera MD;  Location: [facility] EP LAB;  Service: Cardiology;  Laterality: N/A;  AF, DCCV/NADINE, anes, DM, Rm 3073    TYMPANOSTOMY TUBE PLACEMENT  1971- 1979    multiple tube placements       Review of patient's allergies indicates:   Allergen Reactions    Adhesive Blisters     Reaction to area in chest and up only    Codeine Itching       Current Facility-Administered Medications   Medication    [START ON 1/21/2020] amLODIPine tablet 10 mg    [START ON 1/21/2020] aspirin EC tablet 81 mg    [START ON 1/21/2020] atorvastatin tablet 20 mg    [START ON 1/21/2020] calcium-vitamin D3 500 mg(1,250mg) -200 unit per tablet 2 tablet    ferrous sulfate EC tablet 325 mg    gabapentin capsule 300 mg    [START ON 1/21/2020] magnesium oxide tablet 400 mg    mirtazapine tablet 30 mg    mycophenolate capsule 500 mg    ondansetron disintegrating tablet 8 mg    oxyCODONE immediate release tablet Tab 10 mg    [START ON 1/21/2020] pantoprazole EC tablet 40 mg    [START ON 1/21/2020] predniSONE tablet 10 mg    promethazine (PHENERGAN) 12.5 mg in dextrose 5 % 50 mL IVPB    senna-docusate 8.6-50 mg per tablet 2 tablet    [START ON 1/21/2020] SITagliptin tablet 50 mg    [START ON 1/21/2020] sulfamethoxazole-trimethoprim 400-80mg per tablet 1 tablet    tacrolimus capsule 3 mg    [START ON 1/21/2020] valGANciclovir tablet 450 mg    [START ON 1/21/2020] venlafaxine 24 hr capsule 150 mg    zolpidem tablet 5 mg     Family History     Problem  Relation (Age of Onset)    Broken bones Maternal Grandmother    Cancer Paternal Grandmother    Dislocations Maternal Grandmother    Heart failure Paternal Grandfather, Maternal Grandfather    Migraines Mother, Maternal Grandmother, Paternal Grandmother, Paternal Grandfather, Maternal Grandfather    Obesity Paternal Grandmother    Osteoarthritis Mother, Maternal Grandmother, Paternal Grandmother, Paternal Grandfather, Maternal Grandfather, Father    Osteoporosis Maternal Grandmother    Scoliosis Maternal Grandmother    Stroke Father        Tobacco Use    Smoking status: Never Smoker    Smokeless tobacco: Never Used   Substance and Sexual Activity    Alcohol use: No    Drug use: No    Sexual activity: Not Currently     Review of Systems   Constitutional: Positive for fatigue. Negative for fever.   HENT: Negative.    Eyes: Negative.    Respiratory: Positive for shortness of breath.    Cardiovascular: Negative.    Gastrointestinal: Positive for nausea and vomiting. Negative for diarrhea.   Endocrine: Negative.    Genitourinary: Negative.    Musculoskeletal: Negative.    Skin: Negative.    Allergic/Immunologic: Negative.    Neurological: Positive for tremors.   Hematological: Negative.    Psychiatric/Behavioral: Negative.    All other systems reviewed and are negative.    Objective:     Vital Signs (Most Recent):  Temp: 98.1 °F (36.7 °C) (01/20/20 1205)  Pulse: 90 (01/20/20 1205)  Resp: 20 (01/20/20 1205)  BP: 135/60 (01/20/20 1205)  SpO2: 98 % (01/20/20 1205) Vital Signs (24h Range):  Temp:  [98.1 °F (36.7 °C)] 98.1 °F (36.7 °C)  Pulse:  [90] 90  Resp:  [20] 20  SpO2:  [98 %] 98 %  BP: (135)/(60) 135/60     No data found.  There is no height or weight on file to calculate BMI.    No intake or output data in the 24 hours ending 01/20/20 1433    Physical Exam   Constitutional: She is oriented to person, place, and time. She appears well-developed and well-nourished.   HENT:   Head: Normocephalic.   Eyes: Pupils are  equal, round, and reactive to light.   Neck: Normal range of motion. Neck supple. JVD present.   Cardiovascular: Normal rate and regular rhythm.   Pulmonary/Chest: Effort normal and breath sounds normal.   Abdominal: Soft. Bowel sounds are normal.   Musculoskeletal: Normal range of motion.   Neurological: She is alert and oriented to person, place, and time.   Skin: Skin is warm and dry.   Psychiatric: She has a normal mood and affect. Her behavior is normal.   Nursing note and vitals reviewed.    Significant Labs:  CBC:  Recent Labs   Lab 01/14/20  0822 01/20/20  0921   WBC 6.28 7.23   RBC 3.45* 3.53*   HGB 9.2* 9.5*   HCT 31.5* 31.8*   * 418*   MCV 91 90   MCH 26.7* 26.9*   MCHC 29.2* 29.9*     BNP:  Recent Labs   Lab 01/14/20  0822 01/20/20  0921   * 423*     CMP:  Recent Labs   Lab 01/14/20  0822 01/17/20  0850 01/20/20  0921   * 117* 140*   CALCIUM 9.4 9.3 9.3   ALBUMIN 3.3* 3.4* 3.5   PROT 6.6  --  6.5    144 143   K 3.6 4.0 3.9   CO2 25 22* 20*    109 110   BUN 33* 32* 26*   CREATININE 2.7* 2.8* 2.3*   ALKPHOS 91  --  87   ALT 11  --  10   AST 10  --  9*   BILITOT 0.4  --  0.4      Coagulation:   No results for input(s): PT, INR, APTT in the last 168 hours.  LDH:  No results for input(s): LDH in the last 72 hours.  Microbiology:  Microbiology Results (last 7 days)     ** No results found for the last 168 hours. **        I have reviewed all pertinent labs within the past 24 hours.    Diagnostic Results:  I have reviewed all pertinent imaging results/findings within the past 24 hours.    Assessment/Plan:     * Nausea  -Anti emetics prn.  -CMV (-) from 1/14. Will recheck.  -Will decrease MMF dose.  -Hold Bactrim as well for now.     Postural dizziness with presyncope  -Hold hydralazine.  -Continue amlodipine for now.     Status post heart transplant  -Transplant Date 12/16/2019. HM 3 implanted 9/10/19 removed during transplant.    -Transplanted by: Dr. Nuñez.  -CMV Status: D+  R - high risk   -Rejection status: Low Risk.  -Prednisone 10 mg daily,  BID (decreased); Prograf 3/3.   -Biopsy neg 1/7 and 1/14.  -Prophylaxis-  Valcyte. Hold Bactrim with nausea for now.   -Will repeat echo. Will gently diurese with IVP Lasix.     CKD (chronic kidney disease)  -Cr actually trending down since last admission.  -Will continue to monitor.       Americo Albert NP  Heart Transplant  [facility]

## 2020-01-21 LAB
ALBUMIN SERPL BCP-MCNC: 3.2 G/DL (ref 3.5–5.2)
ALP SERPL-CCNC: 74 U/L (ref 55–135)
ALT SERPL W/O P-5'-P-CCNC: 9 U/L (ref 10–44)
ANION GAP SERPL CALC-SCNC: 12 MMOL/L (ref 8–16)
ASCENDING AORTA: 2.2 CM
AST SERPL-CCNC: 9 U/L (ref 10–40)
AV INDEX (PROSTH): 0.77
AV MEAN GRADIENT: 6 MMHG
AV PEAK GRADIENT: 14 MMHG
AV VALVE AREA: 2.2 CM2
AV VELOCITY RATIO: 0.62
BASOPHILS # BLD AUTO: 0.04 K/UL (ref 0–0.2)
BASOPHILS NFR BLD: 0.6 % (ref 0–1.9)
BILIRUB SERPL-MCNC: 0.3 MG/DL (ref 0.1–1)
BNP SERPL-MCNC: 486 PG/ML (ref 0–99)
BSA FOR ECHO PROCEDURE: 2.17 M2
BUN SERPL-MCNC: 23 MG/DL (ref 6–20)
CALCIUM SERPL-MCNC: 8.7 MG/DL (ref 8.7–10.5)
CHLORIDE SERPL-SCNC: 110 MMOL/L (ref 95–110)
CMV IGG SERPL QL IA: NORMAL
CO2 SERPL-SCNC: 22 MMOL/L (ref 23–29)
CREAT SERPL-MCNC: 2.2 MG/DL (ref 0.5–1.4)
CV ECHO LV RWT: 0.32 CM
DIFFERENTIAL METHOD: ABNORMAL
DOP CALC AO PEAK VEL: 1.88 M/S
DOP CALC AO VTI: 33.6 CM
DOP CALC LVOT AREA: 2.9 CM2
DOP CALC LVOT DIAMETER: 1.91 CM
DOP CALC LVOT PEAK VEL: 1.16 M/S
DOP CALC LVOT STROKE VOLUME: 73.8 CM3
DOP CALCLVOT PEAK VEL VTI: 25.77 CM
E WAVE DECELERATION TIME: 137.61 MSEC
E/A RATIO: 3.82
E/E' RATIO: 16.11 M/S
ECHO LV POSTERIOR WALL: 0.72 CM (ref 0.6–1.1)
EOSINOPHIL # BLD AUTO: 0 K/UL (ref 0–0.5)
EOSINOPHIL NFR BLD: 0.6 % (ref 0–8)
ERYTHROCYTE [DISTWIDTH] IN BLOOD BY AUTOMATED COUNT: 17.4 % (ref 11.5–14.5)
EST. GFR  (AFRICAN AMERICAN): 29.3 ML/MIN/1.73 M^2
EST. GFR  (NON AFRICAN AMERICAN): 25.4 ML/MIN/1.73 M^2
FRACTIONAL SHORTENING: 35 % (ref 28–44)
GLUCOSE SERPL-MCNC: 96 MG/DL (ref 70–110)
HCT VFR BLD AUTO: 27.7 % (ref 37–48.5)
HGB BLD-MCNC: 8.2 G/DL (ref 12–16)
IMM GRANULOCYTES # BLD AUTO: 0.04 K/UL (ref 0–0.04)
IMM GRANULOCYTES NFR BLD AUTO: 0.6 % (ref 0–0.5)
INTERVENTRICULAR SEPTUM: 0.69 CM (ref 0.6–1.1)
IVRT: 0.04 MSEC
LEFT ATRIUM SIZE: 3.53 CM
LEFT INTERNAL DIMENSION IN SYSTOLE: 2.95 CM (ref 2.1–4)
LEFT VENTRICLE DIASTOLIC VOLUME INDEX: 45.08 ML/M2
LEFT VENTRICLE DIASTOLIC VOLUME: 96.02 ML
LEFT VENTRICLE MASS INDEX: 47 G/M2
LEFT VENTRICLE SYSTOLIC VOLUME INDEX: 15.8 ML/M2
LEFT VENTRICLE SYSTOLIC VOLUME: 33.72 ML
LEFT VENTRICULAR INTERNAL DIMENSION IN DIASTOLE: 4.57 CM (ref 3.5–6)
LEFT VENTRICULAR MASS: 99.11 G
LV LATERAL E/E' RATIO: 11.15 M/S
LV SEPTAL E/E' RATIO: 29 M/S
LYMPHOCYTES # BLD AUTO: 0.2 K/UL (ref 1–4.8)
LYMPHOCYTES NFR BLD: 3.8 % (ref 18–48)
MAGNESIUM SERPL-MCNC: 1.8 MG/DL (ref 1.6–2.6)
MCH RBC QN AUTO: 26.6 PG (ref 27–31)
MCHC RBC AUTO-ENTMCNC: 29.6 G/DL (ref 32–36)
MCV RBC AUTO: 90 FL (ref 82–98)
MONOCYTES # BLD AUTO: 0.3 K/UL (ref 0.3–1)
MONOCYTES NFR BLD: 4.7 % (ref 4–15)
MV PEAK A VEL: 0.38 M/S
MV PEAK E VEL: 1.45 M/S
NEUTROPHILS # BLD AUTO: 5.7 K/UL (ref 1.8–7.7)
NEUTROPHILS NFR BLD: 89.7 % (ref 38–73)
NRBC BLD-RTO: 0 /100 WBC
PISA TR MAX VEL: 3.16 M/S
PLATELET # BLD AUTO: 353 K/UL (ref 150–350)
PMV BLD AUTO: 9.1 FL (ref 9.2–12.9)
POCT GLUCOSE: 160 MG/DL (ref 70–110)
POTASSIUM SERPL-SCNC: 3.6 MMOL/L (ref 3.5–5.1)
PROT SERPL-MCNC: 6 G/DL (ref 6–8.4)
RA PRESSURE: 15 MMHG
RBC # BLD AUTO: 3.08 M/UL (ref 4–5.4)
RIGHT VENTRICULAR END-DIASTOLIC DIMENSION: 3.43 CM
RV TISSUE DOPPLER FREE WALL SYSTOLIC VELOCITY 1 (APICAL 4 CHAMBER VIEW): 9 CM/S
SINUS: 2.67 CM
SODIUM SERPL-SCNC: 144 MMOL/L (ref 136–145)
STJ: 2.09 CM
TACROLIMUS BLD-MCNC: 13.5 NG/ML (ref 5–15)
TDI LATERAL: 0.13 M/S
TDI SEPTAL: 0.05 M/S
TDI: 0.09 M/S
TR MAX PG: 40 MMHG
TRICUSPID ANNULAR PLANE SYSTOLIC EXCURSION: 1.4 CM
TV REST PULMONARY ARTERY PRESSURE: 55 MMHG
WBC # BLD AUTO: 6.4 K/UL (ref 3.9–12.7)

## 2020-01-21 PROCEDURE — 85025 COMPLETE CBC W/AUTO DIFF WBC: CPT

## 2020-01-21 PROCEDURE — 83735 ASSAY OF MAGNESIUM: CPT

## 2020-01-21 PROCEDURE — 25000003 PHARM REV CODE 250: Performed by: NURSE PRACTITIONER

## 2020-01-21 PROCEDURE — 99226 PR SUBSEQUENT OBSERVATION CARE,LEVEL III: CPT | Mod: ,,, | Performed by: INTERNAL MEDICINE

## 2020-01-21 PROCEDURE — 80197 ASSAY OF TACROLIMUS: CPT

## 2020-01-21 PROCEDURE — G0378 HOSPITAL OBSERVATION PER HR: HCPCS

## 2020-01-21 PROCEDURE — 63600175 PHARM REV CODE 636 W HCPCS: Performed by: NURSE PRACTITIONER

## 2020-01-21 PROCEDURE — 80053 COMPREHEN METABOLIC PANEL: CPT

## 2020-01-21 PROCEDURE — 99226 PR SUBSEQUENT OBSERVATION CARE,LEVEL III: ICD-10-PCS | Mod: ,,, | Performed by: INTERNAL MEDICINE

## 2020-01-21 PROCEDURE — 83880 ASSAY OF NATRIURETIC PEPTIDE: CPT

## 2020-01-21 PROCEDURE — 96376 TX/PRO/DX INJ SAME DRUG ADON: CPT

## 2020-01-21 RX ORDER — FUROSEMIDE 40 MG/1
40 TABLET ORAL DAILY
Status: DISCONTINUED | OUTPATIENT
Start: 2020-01-22 | End: 2020-01-23

## 2020-01-21 RX ADMIN — ATORVASTATIN CALCIUM 20 MG: 20 TABLET, FILM COATED ORAL at 09:01

## 2020-01-21 RX ADMIN — TACROLIMUS 3 MG: 1 CAPSULE ORAL at 05:01

## 2020-01-21 RX ADMIN — AMLODIPINE BESYLATE 10 MG: 10 TABLET ORAL at 09:01

## 2020-01-21 RX ADMIN — VENLAFAXINE HYDROCHLORIDE 150 MG: 37.5 CAPSULE, EXTENDED RELEASE ORAL at 09:01

## 2020-01-21 RX ADMIN — ASPIRIN 81 MG: 81 TABLET, DELAYED RELEASE ORAL at 09:01

## 2020-01-21 RX ADMIN — MYCOPHENOLATE MOFETIL 500 MG: 250 CAPSULE ORAL at 08:01

## 2020-01-21 RX ADMIN — GABAPENTIN 300 MG: 300 CAPSULE ORAL at 08:01

## 2020-01-21 RX ADMIN — MIRTAZAPINE 30 MG: 15 TABLET, FILM COATED ORAL at 08:01

## 2020-01-21 RX ADMIN — MYCOPHENOLATE MOFETIL 500 MG: 250 CAPSULE ORAL at 09:01

## 2020-01-21 RX ADMIN — SITAGLIPTIN 50 MG: 50 TABLET, FILM COATED ORAL at 09:01

## 2020-01-21 RX ADMIN — ZOLPIDEM TARTRATE 5 MG: 5 TABLET, COATED ORAL at 08:01

## 2020-01-21 RX ADMIN — FUROSEMIDE 60 MG: 10 INJECTION, SOLUTION INTRAMUSCULAR; INTRAVENOUS at 09:01

## 2020-01-21 RX ADMIN — OYSTER SHELL CALCIUM WITH VITAMIN D 2 TABLET: 500; 200 TABLET, FILM COATED ORAL at 09:01

## 2020-01-21 RX ADMIN — FERROUS SULFATE TAB EC 325 MG (65 MG FE EQUIVALENT) 325 MG: 325 (65 FE) TABLET DELAYED RESPONSE at 08:01

## 2020-01-21 RX ADMIN — PANTOPRAZOLE SODIUM 40 MG: 40 TABLET, DELAYED RELEASE ORAL at 09:01

## 2020-01-21 RX ADMIN — Medication 400 MG: at 09:01

## 2020-01-21 RX ADMIN — VALGANCICLOVIR 450 MG: 450 TABLET, FILM COATED ORAL at 09:01

## 2020-01-21 RX ADMIN — FERROUS SULFATE TAB EC 325 MG (65 MG FE EQUIVALENT) 325 MG: 325 (65 FE) TABLET DELAYED RESPONSE at 09:01

## 2020-01-21 RX ADMIN — TACROLIMUS 3 MG: 1 CAPSULE ORAL at 09:01

## 2020-01-21 RX ADMIN — PREDNISONE 10 MG: 10 TABLET ORAL at 09:01

## 2020-01-21 NOTE — PROGRESS NOTES
Admit Note     Met with patient to assess needs. Patient is a 49 y.o. single female, admitted for nausea, vomiting, and weakness. Pt received a OHT on 12/16/19.      Patient admitted from clinic on 1/20/2020 .  At this time, patient presents as alert and oriented x 4, good eye contact, calm and communicative.  At this time, patients caregiver not present.    Household/Family Systems     Patient [facility].       When discharged from [facility] apartments the pt plans to go to back to the home of her friend Flavia at:     [address]  [place] [address]        Support system includes mother, sister and good friends.      Patient does not have dependents that are need of being cared for.   The pt doesn't have any children.   The pt's friend Flavia and mother are the pt's primary and secondary caregivers for transplant.      Patients primary caregiver is Flavia Nuñez, best friend.     Pt's cell:  [phone]    Emergency contacts:     Cristal Navas (mother, lives in [place], [place], five hours from [place], works part time and drives) [phone]  [facility]'s [facility].   Flavia Nuñez (best friend, lives in [place], works full time and drives) [phone]  Sharondamani Kitchen (sister, lives in [place]) [phone]    The pt's mother, sister and best friend Flavia are all the pt's HCPA.     During admission, patient's caregiver plans to stay at home.  Confirmed patient and patients caregivers do have access to reliable transportation.    Cognitive Status/Learning     Patient reports reading ability as college and states patient does not have difficulty with reading, writing, seeing, hearing and memory. Pt does wear glasses.   Pt reports needed assistance with learning and comprehending new information.   Patient reports patient learns best by visual.      Needed: No.   Highest education level: High School (9-12) or  GED    Vocation/Disability     Working for Income: No  If no, reason not working: Demands of Treatment  Patient is receiving short term disability from work.  Pt reports she will receive STD until she returns to work.  The pt is an  at a  and hospice company.   Pt reports no financial changes at this time.     Adherence     Patient reports a high level of adherence to patients health care regimen.  Adherence counseling and education provided. Patient verbalizes understanding.    Substance Use    Patient reports the following substance usage.    Tobacco: none, patient denies any use.  Alcohol: none, patient denies any use.  Illicit Drugs/Non-prescribed Medications: none, patient denies any use.  Patient states clear understanding of the potential impact of substance use.  Substance abstinence/cessation counseling, education and resources provided and reviewed.     Services Utilizing/ADLS    Infusion Service: Prior to admission, patient utilizing? no  Home Health: Prior to admission, patient utilizing? yes   [facility]  [phone], fax [phone]. Pt reports she would like to use the same  [facility] when d/c.   DME: Prior to admission, no, Pt reports she has a raised toilet seat, but didn't use.    Pulmonary/Cardiac Rehab: Prior to admission, no  Dialysis:  Prior to admission, no  Transplant Specialty Pharmacy:  Prior to admission, yes; [facility].    Prior to admission, patient reports patient was somewhat  independent with ADLS. Pt reports she moves around the apartment by holding on to the walls. Pt reports she has been very weak and spending most of her time in the apartment. Pt was not driving, family and friends drive.  Patient reports patient is not able to care for self at this time due to compromised medical condition (as documented in medical record) and physical weakness..  Patient indicates a willingness to care for self once medically cleared to do  so.    Insurance/Medications    Insured by   Payor/Plan Subscr  Sex Relation Sub. Ins. ID Effective Group Num   1. YON VILLELA* 1970 Female  [id number]* [id number]                                   [address] [address]   2. YON VILLELA* 1970 Female Self [id number]* 12/15/19                                    [address] [address]      Primary Insurance (for UNOS reporting): Private Insurance  Secondary Insurance (for UNOS reporting): None    Patient reports patient is able to obtain and afford medications at this time and at time of discharge.    Living Will/Healthcare Power of     Patient states patient has a LW and/or HCPA.   provided education regarding LW and HCPA and the completion of forms.    Coping/Mental Health    Patient is coping adequately with the aid of  family members and friends.   Patient indicates mental health difficulties.     Pt reports some difficulty with anxiety/depression given medical needs. Pt is on Effexor and Remeron, but is also open to seeing a therapist at [facility] ([phone]). SW provided pt with the number to call and set up an appt.      Discharge Planning    At time of discharge, patient plans to return to [facility] apartments under the care of self and friend.  Patients friend will transport patient.  Per rounds today, expected discharge date has not been medically determined at this time. Patient and patients caregiver  verbalize understanding and are involved in treatment planning and discharge process.    Additional Concerns    Patient is being followed for needs, education, resources, information, emotional support, supportive counseling, and for supportive and skilled discharge plan of care.  providing ongoing psychosocial support, education, resources and d/c planning as needed.  SW remains available. Patient's caregiver verbalizes understanding and agreement with information  reviewed,  availability and how to access available resources as needed. Patient verbalizes understanding and agreement with information reviewed, social work availability, and how to access available resources as needed.

## 2020-01-21 NOTE — PLAN OF CARE
Pt AAO x4 throughout shift. Pt free from falls and injury throughout shift. Pt with minimal appetite throughout shift. Pt free from falls and injury throughout shift. Echo completed this AM per order. Pt resting throughout shift. Possible D/C tomorrow

## 2020-01-21 NOTE — PLAN OF CARE
-Pt AAOx4  -Vital signs stable.  Pt with no complaints of SOB while on RA  -Telemetry monitoring NSR  -BG monitoring ACHS  -No complaints of nausea tonight  -Cellcept dose decreased, bactrim on hold  -Fall precautions maintained, non skid socks worn  -No acute events/falls/injuries this shift  -Pt aware to call for help with ambulating.    -Bed lowered, locked, siderails up x2, and call bell in reach.

## 2020-01-21 NOTE — PROGRESS NOTES
[facility]  Heart Transplant  Progress Note    Patient Name: Deborah Navas  MRN: [id number]  Admission Date: 1/20/2020  Hospital Length of Stay: 0 days  Attending Physician: Renetta Chaudhari MD  Primary Care Provider: Valeria Verma MD  Principal Problem:Heart transplanted    Subjective:     Interval History: Patient reports feeling better today. She denies shortness of breath when getting up to go to the bathroom. Prior to admission she was dyspneic upon rising from a chair. She plans to ambulate later and let me know how she feels. Denies nausea/vomiting today. Denies fever, chills, productive sputum, diarrhea, any other complaints at this time.     Continuous Infusions:  Scheduled Meds:   amLODIPine  10 mg Oral Daily    aspirin  81 mg Oral Daily    atorvastatin  20 mg Oral Daily    calcium-vitamin D3  2 tablet Oral Daily    ferrous sulfate  325 mg Oral BID    furosemide  60 mg Intravenous BID    gabapentin  300 mg Oral QHS    magnesium oxide  400 mg Oral Daily    mirtazapine  30 mg Oral QHS    mycophenolate  500 mg Oral BID    pantoprazole  40 mg Oral Daily    predniSONE  10 mg Oral Daily    SITagliptin  50 mg Oral Daily    tacrolimus  3 mg Oral BID    valGANciclovir  450 mg Oral Every other day    venlafaxine  150 mg Oral Daily     PRN Meds:ondansetron, oxyCODONE, promethazine (PHENERGAN) IVPB, senna-docusate 8.6-50 mg, zolpidem    Review of patient's allergies indicates:   Allergen Reactions    Adhesive Blisters     Reaction to area in chest and up only    Codeine Itching     Objective:     Vital Signs (Most Recent):  Temp: 98.4 °F (36.9 °C) (01/21/20 0951)  Pulse: 79 (01/21/20 0951)  Resp: 16 (01/21/20 0951)  BP: 128/88 (01/21/20 0951)  SpO2: (!) 92 % (01/21/20 0951) Vital Signs (24h Range):  Temp:  [98.1 °F (36.7 °C)-98.5 °F (36.9 °C)] 98.4 °F (36.9 °C)  Pulse:  [78-94] 79  Resp:  [16-20] 16  SpO2:  [92 %-98 %] 92 %  BP: (117-144)/(60-88) 128/88     Patient Vitals  for the past 72 hrs (Last 3 readings):   Weight   01/21/20 0846 102.5 kg (226 lb)   01/21/20 0500 102.7 kg (226 lb 6.6 oz)     Body mass index is 35.4 kg/m².      Intake/Output Summary (Last 24 hours) at 1/21/2020 1123  Last data filed at 1/21/2020 0500  Gross per 24 hour   Intake --   Output 1800 ml   Net -1800 ml       Hemodynamic Parameters:       Telemetry: NSR 88 bpm    Physical Exam   Constitutional: She is oriented to person, place, and time. She appears well-developed and well-nourished.   HENT:   Head: Normocephalic and atraumatic.   Eyes: Pupils are equal, round, and reactive to light. EOM are normal.   Periorbital edema   Neck: Normal range of motion. Neck supple. No JVD present.   Cardiovascular: Normal rate and regular rhythm.   Sternal incision C/D/I. Large right groin fluid-filled/cystic lesion noted at bypass access site, nontender, no erythema/drainage present.    Pulmonary/Chest: Effort normal and breath sounds normal. No stridor. No respiratory distress.   Decreased bibasilar BS   Abdominal: Soft. Bowel sounds are normal. She exhibits no distension. There is no tenderness.   Musculoskeletal: Edema: trace.   Neurological: She is alert and oriented to person, place, and time.   Skin: Skin is warm and dry.   Psychiatric: She has a normal mood and affect. Her behavior is normal. Judgment and thought content normal.   Nursing note and vitals reviewed.      Significant Labs:  CBC:  Recent Labs   Lab 01/20/20  0921 01/21/20  0640   WBC 7.23 6.40   RBC 3.53* 3.08*   HGB 9.5* 8.2*   HCT 31.8* 27.7*   * 353*   MCV 90 90   MCH 26.9* 26.6*   MCHC 29.9* 29.6*     BNP:  Recent Labs   Lab 01/20/20  0921 01/21/20  0640   * 486*     CMP:  Recent Labs   Lab 01/17/20  0850 01/20/20  0921 01/21/20  0640   * 140* 96   CALCIUM 9.3 9.3 8.7   ALBUMIN 3.4* 3.5 3.2*   PROT  --  6.5 6.0    143 144   K 4.0 3.9 3.6   CO2 22* 20* 22*    110 110   BUN 32* 26* 23*   CREATININE 2.8* 2.3* 2.2*    ALKPHOS  --  87 74   ALT  --  10 9*   AST  --  9* 9*   BILITOT  --  0.4 0.3      Coagulation:   No results for input(s): PT, INR, APTT in the last 168 hours.  LDH:  No results for input(s): LDH in the last 72 hours.  Microbiology:  Microbiology Results (last 7 days)     ** No results found for the last 168 hours. **          I have reviewed all pertinent labs within the past 24 hours.    Estimated Creatinine Clearance: 37.7 mL/min (A) (based on SCr of 2.2 mg/dL (H)).    Diagnostic Results:  I have reviewed and interpreted all pertinent imaging results/findings within the past 24 hours.    Assessment and Plan:     48yo F s/p OHTx on 12/16/2019 for NICM/ HFrEF with HM3 on 9/2019, removed at time of transplant, HLD, obesity, and OA, who presented for worsening nausea and continued dyspnea on exertion. The pathology of native heart revealed non-caseating granulomas typical of sarcoidosis. Was recently admitted with similar symptoms/GLO in which she was diuresed and BP medications adjusted with improvement in renal function.  RHC on 1/7 with RA 12, wedge 15 and high CO/CI. TTE revealed LVEF 65% and normal RV function, mild MR, mod-sev TR, IVC not seen; no pericardial effusion appreciated. She also had significant tremors on admission and theophylline levels were found to be severely elevated so it was discontinued, with some improvement. EMB from 12/31 and 1/7 were both negative for Ab or cell-mediated rejection. She states that she did OK when initially discharged but has been getting progressively more nauseous over the last few days. Denies any sick contacts. No diarrhea. Vomiting occasionally but mostly dry heaves. She also still complains of dyspnea with minimal exertion which has not imrpoved since surgery.     * Heart transplanted  -Transplant Date 12/16/2019. HM 3 implanted 9/10/19 removed during transplant.    -Transplanted by: Dr. Nuñez.  -CMV Status: D+ R - high risk   -Rejection status: Low  Risk.  -Prednisone 10 mg daily,  BID (decreased); Prograf 3/3.   -Biopsy neg 1/7 and 1/14.  -Prophylaxis-  Valcyte. Bactrim held on admit for nausea. Will discuss on rounds  -Will review 2D echo done today with staff.   -Continue Lasix 60 mg IV BID    Postural dizziness with presyncope  -Hold hydralazine.  -Continue amlodipine for now.     Nausea  -Anti emetics prn.  -CMV (-) from 1/14. Repeat in process  -MMF dose decreased on admission, bactrim held  -Nausea improved overnight.       CKD (chronic kidney disease)  -Cr actually trending down since last admission.  -Will continue to monitor.          Adry Cruz PA-C  Heart Transplant  [facility]

## 2020-01-21 NOTE — NURSING
Rounds Report: Attended interdisciplinary rounds this afternoon   with the transplant team including SW, physicians, fellows,  mid-level providers, and transplant coordinators.Discussed plan of care, including DC possible Wed 1/22/20, current medications and current plan to proceed outpt sleep clinic.

## 2020-01-21 NOTE — ASSESSMENT & PLAN NOTE
-Anti emetics prn.  -CMV (-) from 1/14. Repeat in process  -MMF dose decreased on admission, bactrim held  -Nausea improved overnight.

## 2020-01-21 NOTE — ASSESSMENT & PLAN NOTE
-Transplant Date 12/16/2019. HM 3 implanted 9/10/19 removed during transplant.    -Transplanted by: Dr. Nuñez.  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  -Prednisone 10 mg daily,  BID (decreased); Prograf 3/3.   -Biopsy neg 1/7 and 1/14.  -Prophylaxis-  Valcyte. Bactrim held on admit for nausea. Will discuss on rounds  -Will review 2D echo done today with staff.   -Continue Lasix 60 mg IV BID

## 2020-01-21 NOTE — SUBJECTIVE & OBJECTIVE
Interval History: Patient reports feeling better today. She denies shortness of breath when getting up to go to the bathroom. Prior to admission she was dyspneic upon rising from a chair. She plans to ambulate later and let me know how she feels. Denies nausea/vomiting today. Denies fever, chills, productive sputum, diarrhea, any other complaints at this time.     Continuous Infusions:  Scheduled Meds:   amLODIPine  10 mg Oral Daily    aspirin  81 mg Oral Daily    atorvastatin  20 mg Oral Daily    calcium-vitamin D3  2 tablet Oral Daily    ferrous sulfate  325 mg Oral BID    furosemide  60 mg Intravenous BID    gabapentin  300 mg Oral QHS    magnesium oxide  400 mg Oral Daily    mirtazapine  30 mg Oral QHS    mycophenolate  500 mg Oral BID    pantoprazole  40 mg Oral Daily    predniSONE  10 mg Oral Daily    SITagliptin  50 mg Oral Daily    tacrolimus  3 mg Oral BID    valGANciclovir  450 mg Oral Every other day    venlafaxine  150 mg Oral Daily     PRN Meds:ondansetron, oxyCODONE, promethazine (PHENERGAN) IVPB, senna-docusate 8.6-50 mg, zolpidem    Review of patient's allergies indicates:   Allergen Reactions    Adhesive Blisters     Reaction to area in chest and up only    Codeine Itching     Objective:     Vital Signs (Most Recent):  Temp: 98.4 °F (36.9 °C) (01/21/20 0951)  Pulse: 79 (01/21/20 0951)  Resp: 16 (01/21/20 0951)  BP: 128/88 (01/21/20 0951)  SpO2: (!) 92 % (01/21/20 0951) Vital Signs (24h Range):  Temp:  [98.1 °F (36.7 °C)-98.5 °F (36.9 °C)] 98.4 °F (36.9 °C)  Pulse:  [78-94] 79  Resp:  [16-20] 16  SpO2:  [92 %-98 %] 92 %  BP: (117-144)/(60-88) 128/88     Patient Vitals for the past 72 hrs (Last 3 readings):   Weight   01/21/20 0846 102.5 kg (226 lb)   01/21/20 0500 102.7 kg (226 lb 6.6 oz)     Body mass index is 35.4 kg/m².      Intake/Output Summary (Last 24 hours) at 1/21/2020 1123  Last data filed at 1/21/2020 0500  Gross per 24 hour   Intake --   Output 1800 ml   Net -1800 ml        Hemodynamic Parameters:       Telemetry: NSR 88 bpm    Physical Exam   Constitutional: She is oriented to person, place, and time. She appears well-developed and well-nourished.   HENT:   Head: Normocephalic and atraumatic.   Eyes: Pupils are equal, round, and reactive to light. EOM are normal.   Periorbital edema   Neck: Normal range of motion. Neck supple. No JVD present.   Cardiovascular: Normal rate and regular rhythm.   Sternal incision C/D/I. Large right groin fluid-filled/cystic lesion noted at bypass access site, nontender, no erythema/drainage present.    Pulmonary/Chest: Effort normal and breath sounds normal. No stridor. No respiratory distress.   Decreased bibasilar BS   Abdominal: Soft. Bowel sounds are normal. She exhibits no distension. There is no tenderness.   Musculoskeletal: Edema: trace.   Neurological: She is alert and oriented to person, place, and time.   Skin: Skin is warm and dry.   Psychiatric: She has a normal mood and affect. Her behavior is normal. Judgment and thought content normal.   Nursing note and vitals reviewed.      Significant Labs:  CBC:  Recent Labs   Lab 01/20/20  0921 01/21/20  0640   WBC 7.23 6.40   RBC 3.53* 3.08*   HGB 9.5* 8.2*   HCT 31.8* 27.7*   * 353*   MCV 90 90   MCH 26.9* 26.6*   MCHC 29.9* 29.6*     BNP:  Recent Labs   Lab 01/20/20  0921 01/21/20  0640   * 486*     CMP:  Recent Labs   Lab 01/17/20  0850 01/20/20  0921 01/21/20  0640   * 140* 96   CALCIUM 9.3 9.3 8.7   ALBUMIN 3.4* 3.5 3.2*   PROT  --  6.5 6.0    143 144   K 4.0 3.9 3.6   CO2 22* 20* 22*    110 110   BUN 32* 26* 23*   CREATININE 2.8* 2.3* 2.2*   ALKPHOS  --  87 74   ALT  --  10 9*   AST  --  9* 9*   BILITOT  --  0.4 0.3      Coagulation:   No results for input(s): PT, INR, APTT in the last 168 hours.  LDH:  No results for input(s): LDH in the last 72 hours.  Microbiology:  Microbiology Results (last 7 days)     ** No results found for the last 168 hours. **           I have reviewed all pertinent labs within the past 24 hours.    Estimated Creatinine Clearance: 37.7 mL/min (A) (based on SCr of 2.2 mg/dL (H)).    Diagnostic Results:  I have reviewed and interpreted all pertinent imaging results/findings within the past 24 hours.

## 2020-01-22 LAB
ALBUMIN SERPL BCP-MCNC: 3 G/DL (ref 3.5–5.2)
ALP SERPL-CCNC: 73 U/L (ref 55–135)
ALT SERPL W/O P-5'-P-CCNC: 9 U/L (ref 10–44)
ANION GAP SERPL CALC-SCNC: 10 MMOL/L (ref 8–16)
AST SERPL-CCNC: 8 U/L (ref 10–40)
BASOPHILS # BLD AUTO: 0.05 K/UL (ref 0–0.2)
BASOPHILS NFR BLD: 0.9 % (ref 0–1.9)
BILIRUB SERPL-MCNC: 0.3 MG/DL (ref 0.1–1)
BUN SERPL-MCNC: 22 MG/DL (ref 6–20)
CALCIUM SERPL-MCNC: 8.8 MG/DL (ref 8.7–10.5)
CHLORIDE SERPL-SCNC: 110 MMOL/L (ref 95–110)
CMV DNA SERPL NAA+PROBE-ACNC: NORMAL IU/ML
CO2 SERPL-SCNC: 23 MMOL/L (ref 23–29)
CREAT SERPL-MCNC: 2.3 MG/DL (ref 0.5–1.4)
DIFFERENTIAL METHOD: ABNORMAL
EOSINOPHIL # BLD AUTO: 0 K/UL (ref 0–0.5)
EOSINOPHIL NFR BLD: 0.4 % (ref 0–8)
ERYTHROCYTE [DISTWIDTH] IN BLOOD BY AUTOMATED COUNT: 17.3 % (ref 11.5–14.5)
EST. GFR  (AFRICAN AMERICAN): 27.7 ML/MIN/1.73 M^2
EST. GFR  (NON AFRICAN AMERICAN): 24.1 ML/MIN/1.73 M^2
GLUCOSE SERPL-MCNC: 88 MG/DL (ref 70–110)
HCT VFR BLD AUTO: 28 % (ref 37–48.5)
HGB BLD-MCNC: 8.3 G/DL (ref 12–16)
IMM GRANULOCYTES # BLD AUTO: 0.05 K/UL (ref 0–0.04)
IMM GRANULOCYTES NFR BLD AUTO: 0.9 % (ref 0–0.5)
LYMPHOCYTES # BLD AUTO: 0.2 K/UL (ref 1–4.8)
LYMPHOCYTES NFR BLD: 4.1 % (ref 18–48)
MAGNESIUM SERPL-MCNC: 1.8 MG/DL (ref 1.6–2.6)
MCH RBC QN AUTO: 26.7 PG (ref 27–31)
MCHC RBC AUTO-ENTMCNC: 29.6 G/DL (ref 32–36)
MCV RBC AUTO: 90 FL (ref 82–98)
MONOCYTES # BLD AUTO: 0.2 K/UL (ref 0.3–1)
MONOCYTES NFR BLD: 3.9 % (ref 4–15)
NEUTROPHILS # BLD AUTO: 5.1 K/UL (ref 1.8–7.7)
NEUTROPHILS NFR BLD: 89.8 % (ref 38–73)
NRBC BLD-RTO: 0 /100 WBC
PLATELET # BLD AUTO: 312 K/UL (ref 150–350)
PMV BLD AUTO: 8.9 FL (ref 9.2–12.9)
POCT GLUCOSE: 185 MG/DL (ref 70–110)
POTASSIUM SERPL-SCNC: 3.6 MMOL/L (ref 3.5–5.1)
PROT SERPL-MCNC: 5.6 G/DL (ref 6–8.4)
RBC # BLD AUTO: 3.11 M/UL (ref 4–5.4)
SODIUM SERPL-SCNC: 143 MMOL/L (ref 136–145)
TACROLIMUS BLD-MCNC: 10.9 NG/ML (ref 5–15)
WBC # BLD AUTO: 5.62 K/UL (ref 3.9–12.7)

## 2020-01-22 PROCEDURE — 83735 ASSAY OF MAGNESIUM: CPT

## 2020-01-22 PROCEDURE — 85025 COMPLETE CBC W/AUTO DIFF WBC: CPT

## 2020-01-22 PROCEDURE — 97530 THERAPEUTIC ACTIVITIES: CPT

## 2020-01-22 PROCEDURE — 99233 PR SUBSEQUENT HOSPITAL CARE,LEVL III: ICD-10-PCS | Mod: ,,, | Performed by: INTERNAL MEDICINE

## 2020-01-22 PROCEDURE — 80053 COMPREHEN METABOLIC PANEL: CPT

## 2020-01-22 PROCEDURE — 20600001 HC STEP DOWN PRIVATE ROOM

## 2020-01-22 PROCEDURE — 97161 PT EVAL LOW COMPLEX 20 MIN: CPT

## 2020-01-22 PROCEDURE — 25000003 PHARM REV CODE 250: Performed by: INTERNAL MEDICINE

## 2020-01-22 PROCEDURE — 36415 COLL VENOUS BLD VENIPUNCTURE: CPT

## 2020-01-22 PROCEDURE — 63600175 PHARM REV CODE 636 W HCPCS: Performed by: NURSE PRACTITIONER

## 2020-01-22 PROCEDURE — 97165 OT EVAL LOW COMPLEX 30 MIN: CPT

## 2020-01-22 PROCEDURE — 99233 SBSQ HOSP IP/OBS HIGH 50: CPT | Mod: ,,, | Performed by: INTERNAL MEDICINE

## 2020-01-22 PROCEDURE — 25000003 PHARM REV CODE 250: Performed by: NURSE PRACTITIONER

## 2020-01-22 PROCEDURE — 25000003 PHARM REV CODE 250: Performed by: PHYSICIAN ASSISTANT

## 2020-01-22 PROCEDURE — 80197 ASSAY OF TACROLIMUS: CPT

## 2020-01-22 RX ORDER — TERBUTALINE SULFATE 5 MG/1
2.5 TABLET ORAL 4 TIMES DAILY
Status: CANCELLED | OUTPATIENT
Start: 2020-01-22

## 2020-01-22 RX ORDER — SULFAMETHOXAZOLE AND TRIMETHOPRIM 400; 80 MG/1; MG/1
1 TABLET ORAL DAILY
Status: DISCONTINUED | OUTPATIENT
Start: 2020-01-22 | End: 2020-01-23

## 2020-01-22 RX ORDER — TERBUTALINE SULFATE 2.5 MG/1
5 TABLET ORAL 3 TIMES DAILY
Status: DISCONTINUED | OUTPATIENT
Start: 2020-01-22 | End: 2020-01-27 | Stop reason: HOSPADM

## 2020-01-22 RX ADMIN — MYCOPHENOLATE MOFETIL 500 MG: 250 CAPSULE ORAL at 08:01

## 2020-01-22 RX ADMIN — GABAPENTIN 300 MG: 300 CAPSULE ORAL at 08:01

## 2020-01-22 RX ADMIN — OYSTER SHELL CALCIUM WITH VITAMIN D 2 TABLET: 500; 200 TABLET, FILM COATED ORAL at 08:01

## 2020-01-22 RX ADMIN — ASPIRIN 81 MG: 81 TABLET, DELAYED RELEASE ORAL at 08:01

## 2020-01-22 RX ADMIN — Medication 400 MG: at 08:01

## 2020-01-22 RX ADMIN — ATORVASTATIN CALCIUM 20 MG: 20 TABLET, FILM COATED ORAL at 08:01

## 2020-01-22 RX ADMIN — TACROLIMUS 3 MG: 1 CAPSULE ORAL at 08:01

## 2020-01-22 RX ADMIN — SULFAMETHOXAZOLE AND TRIMETHOPRIM 1 TABLET: 400; 80 TABLET ORAL at 02:01

## 2020-01-22 RX ADMIN — VENLAFAXINE HYDROCHLORIDE 150 MG: 37.5 CAPSULE, EXTENDED RELEASE ORAL at 08:01

## 2020-01-22 RX ADMIN — PANTOPRAZOLE SODIUM 40 MG: 40 TABLET, DELAYED RELEASE ORAL at 08:01

## 2020-01-22 RX ADMIN — PREDNISONE 10 MG: 10 TABLET ORAL at 08:01

## 2020-01-22 RX ADMIN — SITAGLIPTIN 50 MG: 50 TABLET, FILM COATED ORAL at 08:01

## 2020-01-22 RX ADMIN — FUROSEMIDE 40 MG: 40 TABLET ORAL at 08:01

## 2020-01-22 RX ADMIN — AMLODIPINE BESYLATE 10 MG: 10 TABLET ORAL at 08:01

## 2020-01-22 RX ADMIN — TERBUTALINE SULFATE 5 MG: 2.5 TABLET ORAL at 04:01

## 2020-01-22 RX ADMIN — MIRTAZAPINE 30 MG: 15 TABLET, FILM COATED ORAL at 08:01

## 2020-01-22 RX ADMIN — FERROUS SULFATE TAB EC 325 MG (65 MG FE EQUIVALENT) 325 MG: 325 (65 FE) TABLET DELAYED RESPONSE at 08:01

## 2020-01-22 RX ADMIN — TACROLIMUS 3 MG: 1 CAPSULE ORAL at 05:01

## 2020-01-22 RX ADMIN — ZOLPIDEM TARTRATE 5 MG: 5 TABLET, COATED ORAL at 10:01

## 2020-01-22 NOTE — PT/OT/SLP EVAL
Occupational Therapy   Evaluation    Name: Deborah Navas  MRN: [id number]  Admitting Diagnosis:  Heart transplanted      Recommendations:     Discharge Recommendations: outpatient OT  Discharge Equipment Recommendations:  shower chair  Barriers to discharge:  None    Assessment:     Deborah Navas is a 49 y.o. female with a medical diagnosis of Heart transplanted. Discussed D/C options with pt and she would like OP so that she has more access to equipment and will be more motivated to exercise and get out the house. Pt close to functional baseline but exhibits poor endurance and requires frequent rest breaks during activities. Performance deficits affecting function: weakness, impaired endurance, impaired self care skills, impaired functional mobilty, impaired cardiopulmonary response to activity.      Rehab Prognosis: Good; patient would benefit from acute skilled OT services to address these deficits and reach maximum level of function.       Plan:     Patient to be seen 2 x/week to address the above listed problems via self-care/home management, therapeutic activities, therapeutic exercises  · Plan of Care Expires:    · Plan of Care Reviewed with: patient    Subjective     Occupational Profile:  Living Environment: Pt lives alone, with 2 cats, apartment, 2 ZURI.   Prior to admission, patients level of function was I with all functional mobility and ADLs. Pt was D/Cd to [facility] after acute stay but [place] [facility] [place]. Reports mostly sedentary outside of HH PT/OT.  Equipment used at home: none.  DME owned (not currently used): none.  Upon discharge, patient will have assistance from mother.    Pain/Comfort:  · Pain Rating 1: 0/10    Patients cultural, spiritual, Quaker conflicts given the current situation:      Objective:     Communicated with: rn prior to session.  Patient found EOB with   upon OT entry to room.    General Precautions: Standard, fall, sternal   Orthopedic  Precautions:    Braces:       Occupational Performance:    Bed Mobility:    · Independent    Functional Mobility/Transfers:  · Patient completed Sit <> Stand Transfer with supervision  with  no assistive device   · Functional Mobility: Pt walked ~ 100' x 2 with 1 standing rest break due to SOB/fatigue.    Activities of Daily Living:  · Mostly setup but fatigues easily.    Cognitive/Visual Perceptual:  Cognitive/Psychosocial Skills:     -       Oriented to: Person, Place, Time and Situation   -       Safety awareness/insight to disability: intact     Physical Exam:  BUE AROM/MMT: WFL    AMPAC 6 Click ADL:  AMPAC Total Score: 23    Treatment & Education:  UE ROM/MMT  Bed mobility training / assessment  Functional mobility assessment  Sit/standing balance assessment  Educated on importance of sitting OOB in bedside chair to promote increased strength, endurance & breathing.  Discussed OT POC / Post-acute plan  Education:    Patient left up in chair with call button in reach    GOALS:   Multidisciplinary Problems     Occupational Therapy Goals        Problem: Occupational Therapy Goal    Goal Priority Disciplines Outcome Interventions   Occupational Therapy Goal     OT, PT/OT Ongoing, Progressing    Description:  Goals to be met by: 1/29/20     Patient will increase functional independence with ADLs by performing:    Grooming while standing at sink with Set-up Assistance.  Toileting from toilet with Set-up Assistance for hygiene and clothing management.   Toilet transfer to toilet with [place].  Upper extremity exercise program and energy conservation education as needed.                      History:     Past Medical History:   Diagnosis Date    Fractures     History of ventricular fibrillation 9/4/2019    History of ventricular tachycardia 9/4/2019    Hyperlipidemia     Migraine     Multinodular goiter 9/5/2019    Osteoarthritis        Past Surgical History:   Procedure Laterality Date    BACK SURGERY       2007    BIOPSY WITH ULTRASOUND GUIDANCE N/A 12/31/2019    Procedure: BIOPSY, WITH US GUIDANCE;  Surgeon: Eric Antunez Jr., MD;  Location: [facility] CATH LAB;  Service: Cardiology;  Laterality: N/A;    BIOPSY WITH ULTRASOUND GUIDANCE N/A 1/7/2020    Procedure: BIOPSY, WITH US GUIDANCE;  Surgeon: Renetta Chaudhari MD;  Location: [facility] CATH LAB;  Service: Cardiology;  Laterality: N/A;    BIOPSY WITH ULTRASOUND GUIDANCE N/A 1/14/2020    Procedure: BIOPSY, WITH US GUIDANCE;  Surgeon: Renetta Chaudhari MD;  Location: [facility] CATH LAB;  Service: Cardiology;  Laterality: N/A;    BONE GRAFT Left     from Left hip to Left FA    eardrum reconstruction  1980    ELBOW SURGERY Left 5159-5609    FOREARM FRACTURE SURGERY Bilateral [id number]    multiple surgeries    HEART TRANSPLANT N/A 12/16/2019    Procedure: TRANSPLANT, HEART;  Surgeon: Talha Nuñez MD;  Location: [facility] OR [address] [place] [address];  Service: Cardiothoracic;  Laterality: N/A;    INSERTION OF GRAFT TO PERICARDIUM  9/10/2019    Procedure: INSERTION, GRAFT, PERICARDIUM;  Surgeon: Shar Walden MD;  Location: [facility] OR [address] [place] [address];  Service: Cardiovascular;;    INSERTION OF IMPLANTABLE CARDIOVERTER-DEFIBRILLATOR (ICD) GENERATOR WITH TWO EXISTING LEADS      INSERTION OF PACEMAKER Left     LEFT VENTRICULAR ASSIST DEVICE N/A 9/10/2019    Procedure: INSERTION-LEFT VENTRICULAR ASSIST DEVICE;  Surgeon: Shar Walden MD;  Location: [facility] OR [address] [place] [address];  Service: Cardiovascular;  Laterality: N/A;  DT HM3     LUMBAR FUSION  2007    L4-L5    RIGHT HEART CATHETERIZATION Right 9/4/2019    Procedure: INSERTION, CATHETER, RIGHT HEART;  Surgeon: Vi Bryant MD;  Location: [facility] CATH LAB;  Service: Cardiology;  Laterality: Right;    RIGHT HEART CATHETERIZATION N/A 11/18/2019    Procedure: INSERTION, CATHETER, RIGHT HEART;  Surgeon: Eric Antunez Jr., MD;  Location: [facility] CATH LAB;  Service: Cardiology;  Laterality: N/A;    RIGHT HEART CATHETERIZATION Right 12/31/2019    Procedure:  INSERTION, CATHETER, RIGHT HEART;  Surgeon: Eric Antunez Jr., MD;  Location: [facility] CATH LAB;  Service: Cardiology;  Laterality: Right;    RIGHT HEART CATHETERIZATION Right 1/7/2020    Procedure: INSERTION, CATHETER, RIGHT HEART;  Surgeon: Renetta Chaudhari MD;  Location: [facility] CATH LAB;  Service: Cardiology;  Laterality: Right;    SINUS SURGERY Right 1994    with lymph nodes    STERNAL WOUND CLOSURE  9/10/2019    Procedure: CLOSURE, WOUND, STERNUM;  Surgeon: Shar Walden MD;  [facility];  Service: Cardiovascular;;    TEMPOROMANDIBULAR JOINT SURGERY Right 1988    TONSILLECTOMY      TREATMENT OF CARDIAC ARRHYTHMIA N/A 9/24/2019    Procedure: CARDIOVERSION;  Surgeon: Moe Barrera MD;  Location: [facility] EP LAB;  Service: Cardiology;  Laterality: N/A;  AF, DCCV/NADINE, anes, DM, Rm 3073    TYMPANOSTOMY TUBE PLACEMENT  1971- 1979    multiple tube placements       Time Tracking:     OT Date of Treatment: 01/15/20  OT Start Time: 0938  OT Stop Time: 1002  OT Total Time (min): 24 min    Billable Minutes:Evaluation 24    JAGJIT Jensen  1/22/2020

## 2020-01-22 NOTE — NURSING
Rounds Report: Attended interdisciplinary rounds this afternoon   with the transplant team including SW, physicians, fellows,  mid-level providers, and transplant coordinators.Discussed plan of care, including DC date tomorrow. Pt will be placed on Terbutaline for HR and outpatient PT will be consulted. I met with pt and caregiver today (see former note of coordinator)

## 2020-01-22 NOTE — PT/OT/SLP EVAL
Physical Therapy Evaluation    Patient Name:  Deborah Navas   MRN:  [id number]    Recommendations:     Discharge Recommendations:  outpatient PT   Discharge Equipment Recommendations: shower chair   Barriers to discharge: None    Assessment:     Deborah Navas is a 49 y.o. female admitted with a medical diagnosis of Heart transplanted.  She presents with the following impairments/functional limitations:  impaired endurance, impaired cardiopulmonary response to activity, impaired functional mobilty, impaired self care skills Pt. cooperative and tolerated treatment well. Pt. progressing with mobility.    Rehab Prognosis: Good; patient would benefit from acute skilled PT services to address these deficits and reach maximum level of function.    Recent Surgery: * No surgery found *      Plan:     During this hospitalization, patient to be seen 3 x/week to address the identified rehab impairments via gait training, therapeutic activities, therapeutic exercises and progress toward the following goals:    · Plan of Care Expires:  02/21/20    Subjective     Chief Complaint: decreased endurance  Patient/Family Comments/goals: to get stronger  Pain/Comfort:  · Pain Rating 1: 0/10  · Pain Rating Post-Intervention 1: 0/10    Patients cultural, spiritual, Latter-day conflicts given the current situation: no    Living Environment:  Pt lives alone, with 2 cats, apartment, 2 ZURI. However, has been staying with a friend in [facility].  Prior to admission, patients level of function was indep.  Equipment used at home:  none.  Upon discharge, patient will have assistance from friend.    Objective:     Communicated with nursing prior to session.  Patient found seated EOB with telemetry  upon PT entry to room.    General Precautions: Standard, fall, sternal   Orthopedic Precautions:    Braces:       Exams:  · RLE ROM: WFL  · RLE Strength: WFL  · LLE ROM: WFL  · LLE Strength: WFL    Functional  Mobility:  · Transfers:     · Sit to Stand:  stand by assistance with no AD  · Gait: 260' with SBA without AD or LOB. Pt. had extended standing rest break midway of gait trial.  · Balance: fair+      Therapeutic Activities and Exercises:   Discussed therapy options(HH,OP, IP Rehab), goals, and POC.    AM-PAC 6 CLICK MOBILITY  Total Score:21     Patient left up in chair with all lines intact and call button in reach.    GOALS:   Multidisciplinary Problems     Physical Therapy Goals        Problem: Physical Therapy Goal    Goal Priority Disciplines Outcome Goal Variances Interventions   Physical Therapy Goal     PT, PT/OT      Description:  Goals to be met by: 2020     Patient will increase functional independence with mobility by performin. Supine to sit with Set-up Luck  2. Sit to supine with Set-up Luck  3. Sit to stand transfer with Supervision  4. Bed to chair transfer with Supervision  5. Gait  x 200 feet with Supervision without rest breaks.   6. Lower extremity exercise program x15 reps per handout, with supervision                      History:     Past Medical History:   Diagnosis Date    Fractures     History of ventricular fibrillation 2019    History of ventricular tachycardia 2019    Hyperlipidemia     Migraine     Multinodular goiter 2019    Osteoarthritis        Past Surgical History:   Procedure Laterality Date    BACK SURGERY          BIOPSY WITH ULTRASOUND GUIDANCE N/A 2019    Procedure: BIOPSY, WITH US GUIDANCE;  Surgeon: Eric Antunez Jr., MD;  Location: [facility] CATH LAB;  Service: Cardiology;  Laterality: N/A;    BIOPSY WITH ULTRASOUND GUIDANCE N/A 2020    Procedure: BIOPSY, WITH US GUIDANCE;  Surgeon: Renetta Chaudhari MD;  Location: [facility] CATH LAB;  Service: Cardiology;  Laterality: N/A;    BIOPSY WITH ULTRASOUND GUIDANCE N/A 2020    Procedure: BIOPSY, WITH US GUIDANCE;  Surgeon: Renetta Chaudhari MD;  Location: [facility] CATH LAB;   Service: Cardiology;  Laterality: N/A;    BONE GRAFT Left     from Left hip to Left FA    eardrum reconstruction  1980    ELBOW SURGERY Left 7220-1287    FOREARM FRACTURE SURGERY Bilateral 5060-5933    multiple surgeries    HEART TRANSPLANT N/A 12/16/2019    Procedure: TRANSPLANT, HEART;  Surgeon: Talha Nuñez MD;  Location: [address];  Service: Cardiothoracic;  Laterality: N/A;    INSERTION OF GRAFT TO PERICARDIUM  9/10/2019    Procedure: INSERTION, GRAFT, PERICARDIUM;  Surgeon: Shar Wladen MD;  Location: [address];  Service: Cardiovascular;;    INSERTION OF IMPLANTABLE CARDIOVERTER-DEFIBRILLATOR (ICD) GENERATOR WITH TWO EXISTING LEADS      INSERTION OF PACEMAKER Left     LEFT VENTRICULAR ASSIST DEVICE N/A 9/10/2019    Procedure: INSERTION-LEFT VENTRICULAR ASSIST DEVICE;  Surgeon: Shar Walden MD;  Location: [address];  Service: Cardiovascular;  Laterality: N/A;  DT HM3     LUMBAR FUSION  2007    L4-L5    RIGHT HEART CATHETERIZATION Right 9/4/2019    Procedure: INSERTION, CATHETER, RIGHT HEART;  Surgeon: Vi Bryant MD;  Location: [facility] CATH LAB;  Service: Cardiology;  Laterality: Right;    RIGHT HEART CATHETERIZATION N/A 11/18/2019    Procedure: INSERTION, CATHETER, RIGHT HEART;  Surgeon: rEic Antunez Jr., MD;  Location: [facility] CATH LAB;  Service: Cardiology;  Laterality: N/A;    RIGHT HEART CATHETERIZATION Right 12/31/2019    Procedure: INSERTION, CATHETER, RIGHT HEART;  Surgeon: Eric Antunez Jr., MD;  Location: [facility] CATH LAB;  Service: Cardiology;  Laterality: Right;    RIGHT HEART CATHETERIZATION Right 1/7/2020    Procedure: INSERTION, CATHETER, RIGHT HEART;  Surgeon: Renetta Chaudhari MD;  Location: [facility] CATH LAB;  Service: Cardiology;  Laterality: Right;    SINUS SURGERY Right 1994    with lymph nodes    STERNAL WOUND CLOSURE  9/10/2019    Procedure: CLOSURE, WOUND, STERNUM;  Surgeon: Shar Walden MD;  Location: [address];  Service:  Cardiovascular;;    TEMPOROMANDIBULAR JOINT SURGERY Right 1988    TONSILLECTOMY      TREATMENT OF CARDIAC ARRHYTHMIA N/A 9/24/2019    Procedure: CARDIOVERSION;  Surgeon: Moe Barrera MD;  Location: [facility] LAB;  Service: Cardiology;  Laterality: N/A;  AF, DCCV/NADINE, anes, DM, Rm 3073    TYMPANOSTOMY TUBE PLACEMENT  1971- 1979    multiple tube placements       Time Tracking:     PT Received On: 01/22/20  PT Start Time: 0938     PT Stop Time: 1001  PT Total Time (min): 23 min     Billable Minutes: Evaluation 15 and Therapeutic Activity 8      Joaquin Capellan, PT  01/22/2020

## 2020-01-22 NOTE — PLAN OF CARE
Pt AAO x4 throughout shift. Pt desats to 80s when falls asleep - 1.5-2L NC used PRN. Pt up ad mike throughout shift. Terbutaline started for HR. Pt free from falls and injury throughout shift. Caregiver at bedside throughout shift.

## 2020-01-22 NOTE — ASSESSMENT & PLAN NOTE
-Transplant Date 12/16/2019. HM 3 implanted 9/10/19 removed during transplant.    -Transplanted by: Dr. Nuñez.  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  -Prednisone 10 mg daily,  BID (decreased); Prograf 3/3.   -Biopsy neg 1/7 and 1/14.  -Prophylaxis-  Valcyte. Bactrim held on admit for nausea. Will discuss on rounds  -Relative bradycardia: did not tolerate theophylline previously (elevated levels and tremors). Will try terbutaline per discussion with pharmacy and staff
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
## 2020-01-22 NOTE — SUBJECTIVE & OBJECTIVE
Interval History: Patient reports feeling better but she has not gotten up and ambulated in the halls. She slept better. No nausea or vomiting over last 24 hours.     Continuous Infusions:  Scheduled Meds:   amLODIPine  10 mg Oral Daily    aspirin  81 mg Oral Daily    atorvastatin  20 mg Oral Daily    calcium-vitamin D3  2 tablet Oral Daily    ferrous sulfate  325 mg Oral BID    furosemide  40 mg Oral Daily    gabapentin  300 mg Oral QHS    magnesium oxide  400 mg Oral Daily    mirtazapine  30 mg Oral QHS    mycophenolate  500 mg Oral BID    pantoprazole  40 mg Oral Daily    predniSONE  10 mg Oral Daily    SITagliptin  50 mg Oral Daily    tacrolimus  3 mg Oral BID    valGANciclovir  450 mg Oral Every other day    venlafaxine  150 mg Oral Daily     PRN Meds:ondansetron, oxyCODONE, promethazine (PHENERGAN) IVPB, senna-docusate 8.6-50 mg, zolpidem    Review of patient's allergies indicates:   Allergen Reactions    Adhesive Blisters     Reaction to area in chest and up only    Codeine Itching     Objective:     Vital Signs (Most Recent):  Temp: 98.3 °F (36.8 °C) (01/22/20 1229)  Pulse: 80 (01/22/20 1229)  Resp: 18 (01/22/20 1229)  BP: 121/80 (01/22/20 1229)  SpO2: (!) 90 % (01/22/20 1229) Vital Signs (24h Range):  Temp:  [98.3 °F (36.8 °C)-99 °F (37.2 °C)] 98.3 °F (36.8 °C)  Pulse:  [77-88] 80  Resp:  [17-26] 18  SpO2:  [90 %-95 %] 90 %  BP: (115-136)/(80-93) 121/80     Patient Vitals for the past 72 hrs (Last 3 readings):   Weight   01/22/20 0400 102.2 kg (225 lb 5 oz)   01/21/20 0846 102.5 kg (226 lb)   01/21/20 0500 102.7 kg (226 lb 6.6 oz)     Body mass index is 35.29 kg/m².      Intake/Output Summary (Last 24 hours) at 1/22/2020 1308  Last data filed at 1/22/2020 0400  Gross per 24 hour   Intake 1360 ml   Output 1525 ml   Net -165 ml       Hemodynamic Parameters:       Telemetry: NSR 72 bpm    Physical Exam   Constitutional: She is oriented to person, place, and time. She appears well-developed  and well-nourished.   HENT:   Head: Normocephalic and atraumatic.   Eyes: Pupils are equal, round, and reactive to light. EOM are normal.   Neck: Normal range of motion. Neck supple. No JVD present.   Cardiovascular: Normal rate and regular rhythm.   Sternal incision C/D/I. Large right groin fluid-filled/cystic lesion noted at bypass access site, nontender, no erythema/drainage present.    Pulmonary/Chest: Effort normal and breath sounds normal. No stridor. No respiratory distress.   Decreased bibasilar BS   Abdominal: Soft. Bowel sounds are normal. She exhibits no distension. There is no tenderness.   Musculoskeletal: Edema: trace.   Neurological: She is alert and oriented to person, place, and time.   Skin: Skin is warm and dry.   Psychiatric: She has a normal mood and affect. Her behavior is normal. Judgment and thought content normal.   Nursing note and vitals reviewed.      Significant Labs:  CBC:  Recent Labs   Lab 01/20/20  0921 01/21/20  0640 01/22/20  0658   WBC 7.23 6.40 5.62   RBC 3.53* 3.08* 3.11*   HGB 9.5* 8.2* 8.3*   HCT 31.8* 27.7* 28.0*   * 353* 312   MCV 90 90 90   MCH 26.9* 26.6* 26.7*   MCHC 29.9* 29.6* 29.6*     BNP:  Recent Labs   Lab 01/20/20  0921 01/21/20  0640   * 486*     CMP:  Recent Labs   Lab 01/20/20  0921 01/21/20  0640 01/22/20  0658   * 96 88   CALCIUM 9.3 8.7 8.8   ALBUMIN 3.5 3.2* 3.0*   PROT 6.5 6.0 5.6*    144 143   K 3.9 3.6 3.6   CO2 20* 22* 23    110 110   BUN 26* 23* 22*   CREATININE 2.3* 2.2* 2.3*   ALKPHOS 87 74 73   ALT 10 9* 9*   AST 9* 9* 8*   BILITOT 0.4 0.3 0.3      Coagulation:   No results for input(s): PT, INR, APTT in the last 168 hours.  LDH:  No results for input(s): LDH in the last 72 hours.  Microbiology:  Microbiology Results (last 7 days)     ** No results found for the last 168 hours. **          I have reviewed all pertinent labs within the past 24 hours.    Estimated Creatinine Clearance: 35.9 mL/min (A) (based on SCr of  2.3 mg/dL (H)).    Diagnostic Results:  I have reviewed and interpreted all pertinent imaging results/findings within the past 24 hours.

## 2020-01-22 NOTE — ASSESSMENT & PLAN NOTE
-Anti emetics prn.  -CMV (-) from 1/14. Repeat in process  -MMF dose decreased on admission, bactrim held  -Nausea improved since adission.

## 2020-01-22 NOTE — PROGRESS NOTES
[facility]  Heart Transplant  Progress Note    Patient Name: Deborah Navas  MRN: [id number]  Admission Date: 1/20/2020  Hospital Length of Stay: 0 days  Attending Physician: Renetta Chaudhari MD  Primary Care Provider: Valeria Verma MD  Principal Problem:Heart transplanted    Subjective:     Interval History: Patient reports feeling better but she has not gotten up and ambulated in the halls. She slept better. No nausea or vomiting over last 24 hours.     Continuous Infusions:  Scheduled Meds:   amLODIPine  10 mg Oral Daily    aspirin  81 mg Oral Daily    atorvastatin  20 mg Oral Daily    calcium-vitamin D3  2 tablet Oral Daily    ferrous sulfate  325 mg Oral BID    furosemide  40 mg Oral Daily    gabapentin  300 mg Oral QHS    magnesium oxide  400 mg Oral Daily    mirtazapine  30 mg Oral QHS    mycophenolate  500 mg Oral BID    pantoprazole  40 mg Oral Daily    predniSONE  10 mg Oral Daily    SITagliptin  50 mg Oral Daily    tacrolimus  3 mg Oral BID    valGANciclovir  450 mg Oral Every other day    venlafaxine  150 mg Oral Daily     PRN Meds:ondansetron, oxyCODONE, promethazine (PHENERGAN) IVPB, senna-docusate 8.6-50 mg, zolpidem    Review of patient's allergies indicates:   Allergen Reactions    Adhesive Blisters     Reaction to area in chest and up only    Codeine Itching     Objective:     Vital Signs (Most Recent):  Temp: 98.3 °F (36.8 °C) (01/22/20 1229)  Pulse: 80 (01/22/20 1229)  Resp: 18 (01/22/20 1229)  BP: 121/80 (01/22/20 1229)  SpO2: (!) 90 % (01/22/20 1229) Vital Signs (24h Range):  Temp:  [98.3 °F (36.8 °C)-99 °F (37.2 °C)] 98.3 °F (36.8 °C)  Pulse:  [77-88] 80  Resp:  [17-26] 18  SpO2:  [90 %-95 %] 90 %  BP: (115-136)/(80-93) 121/80     Patient Vitals for the past 72 hrs (Last 3 readings):   Weight   01/22/20 0400 102.2 kg (225 lb 5 oz)   01/21/20 0846 102.5 kg (226 lb)   01/21/20 0500 102.7 kg (226 lb 6.6 oz)     Body mass index is 35.29  kg/m².      Intake/Output Summary (Last 24 hours) at 1/22/2020 1308  Last data filed at 1/22/2020 0400  Gross per 24 hour   Intake 1360 ml   Output 1525 ml   Net -165 ml       Hemodynamic Parameters:       Telemetry: NSR 72 bpm    Physical Exam   Constitutional: She is oriented to person, place, and time. She appears well-developed and well-nourished.   HENT:   Head: Normocephalic and atraumatic.   Eyes: Pupils are equal, round, and reactive to light. EOM are normal.   Neck: Normal range of motion. Neck supple. No JVD present.   Cardiovascular: Normal rate and regular rhythm.   Sternal incision C/D/I. Large right groin fluid-filled/cystic lesion noted at bypass access site, nontender, no erythema/drainage present.    Pulmonary/Chest: Effort normal and breath sounds normal. No stridor. No respiratory distress.   Decreased bibasilar BS   Abdominal: Soft. Bowel sounds are normal. She exhibits no distension. There is no tenderness.   Musculoskeletal: Edema: trace.   Neurological: She is alert and oriented to person, place, and time.   Skin: Skin is warm and dry.   Psychiatric: She has a normal mood and affect. Her behavior is normal. Judgment and thought content normal.   Nursing note and vitals reviewed.      Significant Labs:  CBC:  Recent Labs   Lab 01/20/20 0921 01/21/20  0640 01/22/20  0658   WBC 7.23 6.40 5.62   RBC 3.53* 3.08* 3.11*   HGB 9.5* 8.2* 8.3*   HCT 31.8* 27.7* 28.0*   * 353* 312   MCV 90 90 90   MCH 26.9* 26.6* 26.7*   MCHC 29.9* 29.6* 29.6*     BNP:  Recent Labs   Lab 01/20/20  0921 01/21/20  0640   * 486*     CMP:  Recent Labs   Lab 01/20/20  0921 01/21/20  0640 01/22/20  0658   * 96 88   CALCIUM 9.3 8.7 8.8   ALBUMIN 3.5 3.2* 3.0*   PROT 6.5 6.0 5.6*    144 143   K 3.9 3.6 3.6   CO2 20* 22* 23    110 110   BUN 26* 23* 22*   CREATININE 2.3* 2.2* 2.3*   ALKPHOS 87 74 73   ALT 10 9* 9*   AST 9* 9* 8*   BILITOT 0.4 0.3 0.3      Coagulation:   No results for input(s):  PT, INR, APTT in the last 168 hours.  LDH:  No results for input(s): LDH in the last 72 hours.  Microbiology:  Microbiology Results (last 7 days)     ** No results found for the last 168 hours. **          I have reviewed all pertinent labs within the past 24 hours.    Estimated Creatinine Clearance: 35.9 mL/min (A) (based on SCr of 2.3 mg/dL (H)).    Diagnostic Results:  I have reviewed and interpreted all pertinent imaging results/findings within the past 24 hours.    Assessment and Plan:     50yo F s/p OHTx on 12/16/2019 for NICM/ HFrEF with HM3 on 9/2019, removed at time of transplant, HLD, obesity, and OA, who presented for worsening nausea and continued dyspnea on exertion. The pathology of native heart revealed non-caseating granulomas typical of sarcoidosis. Was recently admitted with similar symptoms/GLO in which she was diuresed and BP medications adjusted with improvement in renal function.  RHC on 1/7 with RA 12, wedge 15 and high CO/CI. TTE revealed LVEF 65% and normal RV function, mild MR, mod-sev TR, IVC not seen; no pericardial effusion appreciated. She also had significant tremors on admission and theophylline levels were found to be severely elevated so it was discontinued, with some improvement. EMB from 12/31 and 1/7 were both negative for Ab or cell-mediated rejection. She states that she did OK when initially discharged but has been getting progressively more nauseous over the last few days. Denies any sick contacts. No diarrhea. Vomiting occasionally but mostly dry heaves. She also still complains of dyspnea with minimal exertion which has not imrpoved since surgery.     * Heart transplanted  -Transplant Date 12/16/2019. HM 3 implanted 9/10/19 removed during transplant.    -Transplanted by: Dr. Nuñez.  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  -Prednisone 10 mg daily,  BID (decreased); Prograf 3/3.   -Biopsy neg 1/7 and 1/14.  -Prophylaxis-  Valcyte. Bactrim held  on admit for nausea. Will discuss on rounds  -Relative bradycardia: did not tolerate theophylline previously (elevated levels and tremors). Will try terbutaline per discussion with pharmacy and staff      Postural dizziness with presyncope  -Hold hydralazine.  -Continue amlodipine for now.     Nausea  -Anti emetics prn.  -CMV (-) from 1/14. Repeat in process  -MMF dose decreased on admission, bactrim held  -Nausea improved since adission.       CKD (chronic kidney disease)  -Cr actually trending down since last admission.  -Will continue to monitor.        PT eval today     Adry Cruz, GALLO  Heart Transplant  [facility]

## 2020-01-23 ENCOUNTER — EXTERNAL HOME HEALTH (OUTPATIENT)
Dept: HOME HEALTH SERVICES | Facility: HOSPITAL | Age: 50
End: 2020-01-23
Payer: COMMERCIAL

## 2020-01-23 LAB
ALBUMIN SERPL BCP-MCNC: 3.1 G/DL (ref 3.5–5.2)
ALP SERPL-CCNC: 69 U/L (ref 55–135)
ALT SERPL W/O P-5'-P-CCNC: 7 U/L (ref 10–44)
ANION GAP SERPL CALC-SCNC: 10 MMOL/L (ref 8–16)
AST SERPL-CCNC: 7 U/L (ref 10–40)
BASOPHILS # BLD AUTO: 0.04 K/UL (ref 0–0.2)
BASOPHILS NFR BLD: 0.6 % (ref 0–1.9)
BILIRUB SERPL-MCNC: 0.2 MG/DL (ref 0.1–1)
BUN SERPL-MCNC: 24 MG/DL (ref 6–20)
CALCIUM SERPL-MCNC: 8.5 MG/DL (ref 8.7–10.5)
CHLORIDE SERPL-SCNC: 108 MMOL/L (ref 95–110)
CO2 SERPL-SCNC: 23 MMOL/L (ref 23–29)
CREAT SERPL-MCNC: 2.6 MG/DL (ref 0.5–1.4)
DIFFERENTIAL METHOD: ABNORMAL
EOSINOPHIL # BLD AUTO: 0 K/UL (ref 0–0.5)
EOSINOPHIL NFR BLD: 0.4 % (ref 0–8)
ERYTHROCYTE [DISTWIDTH] IN BLOOD BY AUTOMATED COUNT: 17.4 % (ref 11.5–14.5)
EST. GFR  (AFRICAN AMERICAN): 23.9 ML/MIN/1.73 M^2
EST. GFR  (NON AFRICAN AMERICAN): 20.7 ML/MIN/1.73 M^2
GLUCOSE SERPL-MCNC: 99 MG/DL (ref 70–110)
HCT VFR BLD AUTO: 26.9 % (ref 37–48.5)
HGB BLD-MCNC: 8 G/DL (ref 12–16)
IMM GRANULOCYTES # BLD AUTO: 0.08 K/UL (ref 0–0.04)
IMM GRANULOCYTES NFR BLD AUTO: 1.2 % (ref 0–0.5)
LYMPHOCYTES # BLD AUTO: 0.3 K/UL (ref 1–4.8)
LYMPHOCYTES NFR BLD: 4.3 % (ref 18–48)
MAGNESIUM SERPL-MCNC: 1.7 MG/DL (ref 1.6–2.6)
MCH RBC QN AUTO: 26.5 PG (ref 27–31)
MCHC RBC AUTO-ENTMCNC: 29.7 G/DL (ref 32–36)
MCV RBC AUTO: 89 FL (ref 82–98)
MONOCYTES # BLD AUTO: 0.3 K/UL (ref 0.3–1)
MONOCYTES NFR BLD: 4.6 % (ref 4–15)
NEUTROPHILS # BLD AUTO: 6 K/UL (ref 1.8–7.7)
NEUTROPHILS NFR BLD: 88.9 % (ref 38–73)
NRBC BLD-RTO: 0 /100 WBC
PLATELET # BLD AUTO: 277 K/UL (ref 150–350)
PMV BLD AUTO: 9.2 FL (ref 9.2–12.9)
POCT GLUCOSE: 187 MG/DL (ref 70–110)
POTASSIUM SERPL-SCNC: 3.4 MMOL/L (ref 3.5–5.1)
PROT SERPL-MCNC: 5.7 G/DL (ref 6–8.4)
RBC # BLD AUTO: 3.02 M/UL (ref 4–5.4)
SODIUM SERPL-SCNC: 141 MMOL/L (ref 136–145)
TACROLIMUS BLD-MCNC: 12.1 NG/ML (ref 5–15)
WBC # BLD AUTO: 6.74 K/UL (ref 3.9–12.7)

## 2020-01-23 PROCEDURE — 83735 ASSAY OF MAGNESIUM: CPT

## 2020-01-23 PROCEDURE — 36415 COLL VENOUS BLD VENIPUNCTURE: CPT

## 2020-01-23 PROCEDURE — 25000003 PHARM REV CODE 250: Performed by: INTERNAL MEDICINE

## 2020-01-23 PROCEDURE — 63600175 PHARM REV CODE 636 W HCPCS: Performed by: INTERNAL MEDICINE

## 2020-01-23 PROCEDURE — 25000242 PHARM REV CODE 250 ALT 637 W/ HCPCS: Performed by: PHYSICIAN ASSISTANT

## 2020-01-23 PROCEDURE — 94640 AIRWAY INHALATION TREATMENT: CPT

## 2020-01-23 PROCEDURE — 80053 COMPREHEN METABOLIC PANEL: CPT

## 2020-01-23 PROCEDURE — 63600175 PHARM REV CODE 636 W HCPCS: Performed by: NURSE PRACTITIONER

## 2020-01-23 PROCEDURE — 27000221 HC OXYGEN, UP TO 24 HOURS

## 2020-01-23 PROCEDURE — 99233 SBSQ HOSP IP/OBS HIGH 50: CPT | Mod: ,,, | Performed by: INTERNAL MEDICINE

## 2020-01-23 PROCEDURE — 99233 PR SUBSEQUENT HOSPITAL CARE,LEVL III: ICD-10-PCS | Mod: ,,, | Performed by: INTERNAL MEDICINE

## 2020-01-23 PROCEDURE — 80197 ASSAY OF TACROLIMUS: CPT

## 2020-01-23 PROCEDURE — 94761 N-INVAS EAR/PLS OXIMETRY MLT: CPT

## 2020-01-23 PROCEDURE — 25000003 PHARM REV CODE 250: Performed by: PHYSICIAN ASSISTANT

## 2020-01-23 PROCEDURE — 20600001 HC STEP DOWN PRIVATE ROOM

## 2020-01-23 PROCEDURE — 85025 COMPLETE CBC W/AUTO DIFF WBC: CPT

## 2020-01-23 PROCEDURE — 25000003 PHARM REV CODE 250: Performed by: NURSE PRACTITIONER

## 2020-01-23 RX ORDER — PENTAMIDINE ISETHIONATE 300 MG/300MG
300 INHALANT RESPIRATORY (INHALATION) ONCE
Status: COMPLETED | OUTPATIENT
Start: 2020-01-23 | End: 2020-01-23

## 2020-01-23 RX ORDER — ALBUTEROL SULFATE 2.5 MG/.5ML
2.5 SOLUTION RESPIRATORY (INHALATION) ONCE
Status: COMPLETED | OUTPATIENT
Start: 2020-01-23 | End: 2020-01-23

## 2020-01-23 RX ORDER — POTASSIUM CHLORIDE 750 MG/1
40 CAPSULE, EXTENDED RELEASE ORAL ONCE
Status: COMPLETED | OUTPATIENT
Start: 2020-01-23 | End: 2020-01-23

## 2020-01-23 RX ADMIN — FERROUS SULFATE TAB EC 325 MG (65 MG FE EQUIVALENT) 325 MG: 325 (65 FE) TABLET DELAYED RESPONSE at 09:01

## 2020-01-23 RX ADMIN — SULFAMETHOXAZOLE AND TRIMETHOPRIM 1 TABLET: 400; 80 TABLET ORAL at 08:01

## 2020-01-23 RX ADMIN — Medication 400 MG: at 08:01

## 2020-01-23 RX ADMIN — PANTOPRAZOLE SODIUM 40 MG: 40 TABLET, DELAYED RELEASE ORAL at 08:01

## 2020-01-23 RX ADMIN — MYCOPHENOLATE MOFETIL 500 MG: 250 CAPSULE ORAL at 08:01

## 2020-01-23 RX ADMIN — AMLODIPINE BESYLATE 10 MG: 10 TABLET ORAL at 08:01

## 2020-01-23 RX ADMIN — ASPIRIN 81 MG: 81 TABLET, DELAYED RELEASE ORAL at 08:01

## 2020-01-23 RX ADMIN — VALGANCICLOVIR 450 MG: 450 TABLET, FILM COATED ORAL at 08:01

## 2020-01-23 RX ADMIN — OYSTER SHELL CALCIUM WITH VITAMIN D 2 TABLET: 500; 200 TABLET, FILM COATED ORAL at 08:01

## 2020-01-23 RX ADMIN — SITAGLIPTIN 50 MG: 50 TABLET, FILM COATED ORAL at 08:01

## 2020-01-23 RX ADMIN — ATORVASTATIN CALCIUM 20 MG: 20 TABLET, FILM COATED ORAL at 08:01

## 2020-01-23 RX ADMIN — ALBUTEROL SULFATE 2.5 MG: 2.5 SOLUTION RESPIRATORY (INHALATION) at 02:01

## 2020-01-23 RX ADMIN — TACROLIMUS 3 MG: 1 CAPSULE ORAL at 05:01

## 2020-01-23 RX ADMIN — GABAPENTIN 300 MG: 300 CAPSULE ORAL at 09:01

## 2020-01-23 RX ADMIN — FUROSEMIDE 40 MG: 40 TABLET ORAL at 08:01

## 2020-01-23 RX ADMIN — FERROUS SULFATE TAB EC 325 MG (65 MG FE EQUIVALENT) 325 MG: 325 (65 FE) TABLET DELAYED RESPONSE at 08:01

## 2020-01-23 RX ADMIN — VENLAFAXINE HYDROCHLORIDE 150 MG: 37.5 CAPSULE, EXTENDED RELEASE ORAL at 08:01

## 2020-01-23 RX ADMIN — MYCOPHENOLATE MOFETIL 500 MG: 250 CAPSULE ORAL at 09:01

## 2020-01-23 RX ADMIN — PREDNISONE 10 MG: 10 TABLET ORAL at 08:01

## 2020-01-23 RX ADMIN — PENTAMIDINE ISETHIONATE 300 MG: 300 INHALANT RESPIRATORY (INHALATION) at 12:01

## 2020-01-23 RX ADMIN — TERBUTALINE SULFATE 5 MG: 2.5 TABLET ORAL at 03:01

## 2020-01-23 RX ADMIN — TERBUTALINE SULFATE 5 MG: 2.5 TABLET ORAL at 08:01

## 2020-01-23 RX ADMIN — POTASSIUM CHLORIDE 40 MEQ: 750 CAPSULE, EXTENDED RELEASE ORAL at 10:01

## 2020-01-23 RX ADMIN — MIRTAZAPINE 30 MG: 15 TABLET, FILM COATED ORAL at 09:01

## 2020-01-23 RX ADMIN — TACROLIMUS 3 MG: 1 CAPSULE ORAL at 08:01

## 2020-01-23 RX ADMIN — TERBUTALINE SULFATE 5 MG: 2.5 TABLET ORAL at 09:01

## 2020-01-23 NOTE — PROGRESS NOTES
[facility]  Heart Transplant  Progress Note    Patient Name: Deborah Navas  MRN: [id number]  Admission Date: 1/20/2020  Hospital Length of Stay: 1 days  Attending Physician: Renetta Chaudhari MD  Primary Care Provider: Valeria Verma MD  Principal Problem:Heart transplanted    Subjective:     Interval History: Walked with PT yesterday without issue. Reports that she got up later in the afternoon and took 20 steps and felt lightheaded. No nausea/vomiting/diarrhea over last 24 hours. Encouraged her to increase her PO intake.     Continuous Infusions:  Scheduled Meds:   amLODIPine  10 mg Oral Daily    aspirin  81 mg Oral Daily    atorvastatin  20 mg Oral Daily    calcium-vitamin D3  2 tablet Oral Daily    ferrous sulfate  325 mg Oral BID    gabapentin  300 mg Oral QHS    magnesium oxide  400 mg Oral Daily    mirtazapine  30 mg Oral QHS    mycophenolate  500 mg Oral BID    pantoprazole  40 mg Oral Daily    pentamidine  300 mg Inhalation Once    predniSONE  10 mg Oral Daily    SITagliptin  50 mg Oral Daily    tacrolimus  3 mg Oral BID    terbutaline  5 mg Oral TID    valGANciclovir  450 mg Oral Every other day    venlafaxine  150 mg Oral Daily     PRN Meds:ondansetron, oxyCODONE, promethazine (PHENERGAN) IVPB, senna-docusate 8.6-50 mg, zolpidem    Review of patient's allergies indicates:   Allergen Reactions    Adhesive Blisters     Reaction to area in chest and up only    Codeine Itching     Objective:     Vital Signs (Most Recent):  Temp: 98.4 °F (36.9 °C) (01/23/20 1105)  Pulse: 80 (01/23/20 1105)  Resp: 19 (01/23/20 1105)  BP: 138/84 (01/23/20 1105)  SpO2: (!) 91 % (01/23/20 1105) Vital Signs (24h Range):  Temp:  [97.5 °F (36.4 °C)-98.4 °F (36.9 °C)] 98.4 °F (36.9 °C)  Pulse:  [79-99] 80  Resp:  [17-20] 19  SpO2:  [90 %-96 %] 91 %  BP: (118-138)/(75-84) 138/84     Patient Vitals for the past 72 hrs (Last 3 readings):   Weight   01/23/20 0441 98.7 kg (217 lb 9.5 oz)    01/22/20 0400 102.2 kg (225 lb 5 oz)   01/21/20 0846 102.5 kg (226 lb)     Body mass index is 34.08 kg/m².      Intake/Output Summary (Last 24 hours) at 1/23/2020 1129  Last data filed at 1/23/2020 0830  Gross per 24 hour   Intake 540 ml   Output 1000 ml   Net -460 ml       Hemodynamic Parameters:       Telemetry: NSR 70-80s bpm    Physical Exam   Constitutional: She is oriented to person, place, and time. She appears well-developed and well-nourished.   HENT:   Head: Normocephalic and atraumatic.   Eyes: Pupils are equal, round, and reactive to light. EOM are normal.   Neck: Normal range of motion. Neck supple. No JVD present.   Cardiovascular: Normal rate and regular rhythm.   Sternal incision C/D/I. Large right groin fluid-filled/cystic lesion noted at bypass access site, nontender, no erythema/drainage present.    Pulmonary/Chest: Effort normal and breath sounds normal. No stridor. No respiratory distress.   Decreased bibasilar BS   Abdominal: Soft. Bowel sounds are normal. She exhibits no distension. There is no tenderness.   Musculoskeletal: Edema: trace.   Neurological: She is alert and oriented to person, place, and time.   Skin: Skin is warm and dry.   Psychiatric: She has a normal mood and affect. Her behavior is normal. Judgment and thought content normal.   Nursing note and vitals reviewed.      Significant Labs:  CBC:  Recent Labs   Lab 01/21/20  0640 01/22/20  0658 01/23/20  0704   WBC 6.40 5.62 6.74   RBC 3.08* 3.11* 3.02*   HGB 8.2* 8.3* 8.0*   HCT 27.7* 28.0* 26.9*   * 312 277   MCV 90 90 89   MCH 26.6* 26.7* 26.5*   MCHC 29.6* 29.6* 29.7*     BNP:  Recent Labs   Lab 01/20/20  0921 01/21/20  0640   * 486*     CMP:  Recent Labs   Lab 01/21/20  0640 01/22/20  0658 01/23/20  0703   GLU 96 88 99   CALCIUM 8.7 8.8 8.5*   ALBUMIN 3.2* 3.0* 3.1*   PROT 6.0 5.6* 5.7*    143 141   K 3.6 3.6 3.4*   CO2 22* 23 23    110 108   BUN 23* 22* 24*   CREATININE 2.2* 2.3* 2.6*   ALKPHOS 74  73 69   ALT 9* 9* 7*   AST 9* 8* 7*   BILITOT 0.3 0.3 0.2      Coagulation:   No results for input(s): PT, INR, APTT in the last 168 hours.  LDH:  No results for input(s): LDH in the last 72 hours.  Microbiology:  Microbiology Results (last 7 days)     ** No results found for the last 168 hours. **          I have reviewed all pertinent labs within the past 24 hours.    Estimated Creatinine Clearance: 31.2 mL/min (A) (based on SCr of 2.6 mg/dL (H)).    Diagnostic Results:  I have reviewed and interpreted all pertinent imaging results/findings within the past 24 hours.    Assessment and Plan:     50yo F s/p OHTx on 12/16/2019 for NICM/ HFrEF with HM3 on 9/2019, removed at time of transplant, HLD, obesity, and OA, who presented for worsening nausea and continued dyspnea on exertion. The pathology of native heart revealed non-caseating granulomas typical of sarcoidosis. Was recently admitted with similar symptoms/GLO in which she was diuresed and BP medications adjusted with improvement in renal function.  RHC on 1/7 with RA 12, wedge 15 and high CO/CI. TTE revealed LVEF 65% and normal RV function, mild MR, mod-sev TR, IVC not seen; no pericardial effusion appreciated. She also had significant tremors on admission and theophylline levels were found to be severely elevated so it was discontinued, with some improvement. EMB from 12/31 and 1/7 were both negative for Ab or cell-mediated rejection. She states that she did OK when initially discharged but has been getting progressively more nauseous over the last few days. Denies any sick contacts. No diarrhea. Vomiting occasionally but mostly dry heaves. She also still complains of dyspnea with minimal exertion which has not imrpoved since surgery.     * Heart transplanted  -Transplant Date 12/16/2019. HM 3 implanted 9/10/19 removed during transplant.    -Transplanted by: Dr. Nuñez.  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  -Prednisone 10 mg daily,   BID (decreased); Prograf 3/3.   -Biopsy neg 1/7 and 1/14.  -Prophylaxis-  Valcyte. Bactrim held on admit for nausea. Bactrim to be changed to pentamidine per staff  -Relative bradycardia: did not tolerate theophylline previously (elevated levels and tremors). Will try terbutaline per discussion with pharmacy and staff. Difficulty obtaining doses, should be resolved today      Postural dizziness with presyncope  -Hold hydralazine.  -Continue amlodipine for now.     Nausea  -Anti emetics prn.  -CMV (-) from 1/14. CMV negative on repeat  -MMF dose decreased on admission, bactrim held  -Nausea improved since adission.       CKD (chronic kidney disease)  -Cr up today  -Will hold lasix, d/c bactrim and encourage oral hydration  -Will continue to monitor.        Adry Cruz PA-C  Heart Transplant  [facility]

## 2020-01-23 NOTE — ASSESSMENT & PLAN NOTE
-Transplant Date 12/16/2019. HM 3 implanted 9/10/19 removed during transplant.    -Transplanted by: Dr. Nuñez.  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  -Prednisone 10 mg daily,  BID (decreased); Prograf 3/3.   -Biopsy neg 1/7 and 1/14.  -Prophylaxis-  Valcyte. Bactrim held on admit for nausea. Bactrim to be changed to pentamidine per staff  -Relative bradycardia: did not tolerate theophylline previously (elevated levels and tremors). Will try terbutaline per discussion with pharmacy and staff. Difficulty obtaining doses, should be resolved today

## 2020-01-23 NOTE — NURSING
Followed up with pharmacy regarding how much longer the terbutaline will arrive at [facility]. The pharmacist stated the medication still has not arrived. Dr. Lucio duque MD stated to hold this dose when it arrives since it would be given to close to the morning dose. Will continue to monitor.

## 2020-01-23 NOTE — ASSESSMENT & PLAN NOTE
-Anti emetics prn.  -CMV (-) from 1/14. CMV negative on repeat  -MMF dose decreased on admission, bactrim held  -Nausea improved since adission.

## 2020-01-23 NOTE — ASSESSMENT & PLAN NOTE
-Cr up today  -Will hold lasix, d/c bactrim and encourage oral hydration  -Will continue to monitor.

## 2020-01-23 NOTE — PLAN OF CARE
Pt aao x4. Call bell within reach. She is up independent in room. Lasix d/juana today. Encouraging pt to increase oral intake. She verbalized understanding. Will continue to monitor.

## 2020-01-23 NOTE — SUBJECTIVE & OBJECTIVE
Interval History: Walked with PT yesterday without issue. Reports that she got up later in the afternoon and took 20 steps and felt lightheaded. No nausea/vomiting/diarrhea over last 24 hours. Encouraged her to increase her PO intake.     Continuous Infusions:  Scheduled Meds:   amLODIPine  10 mg Oral Daily    aspirin  81 mg Oral Daily    atorvastatin  20 mg Oral Daily    calcium-vitamin D3  2 tablet Oral Daily    ferrous sulfate  325 mg Oral BID    gabapentin  300 mg Oral QHS    magnesium oxide  400 mg Oral Daily    mirtazapine  30 mg Oral QHS    mycophenolate  500 mg Oral BID    pantoprazole  40 mg Oral Daily    pentamidine  300 mg Inhalation Once    predniSONE  10 mg Oral Daily    SITagliptin  50 mg Oral Daily    tacrolimus  3 mg Oral BID    terbutaline  5 mg Oral TID    valGANciclovir  450 mg Oral Every other day    venlafaxine  150 mg Oral Daily     PRN Meds:ondansetron, oxyCODONE, promethazine (PHENERGAN) IVPB, senna-docusate 8.6-50 mg, zolpidem    Review of patient's allergies indicates:   Allergen Reactions    Adhesive Blisters     Reaction to area in chest and up only    Codeine Itching     Objective:     Vital Signs (Most Recent):  Temp: 98.4 °F (36.9 °C) (01/23/20 1105)  Pulse: 80 (01/23/20 1105)  Resp: 19 (01/23/20 1105)  BP: 138/84 (01/23/20 1105)  SpO2: (!) 91 % (01/23/20 1105) Vital Signs (24h Range):  Temp:  [97.5 °F (36.4 °C)-98.4 °F (36.9 °C)] 98.4 °F (36.9 °C)  Pulse:  [79-99] 80  Resp:  [17-20] 19  SpO2:  [90 %-96 %] 91 %  BP: (118-138)/(75-84) 138/84     Patient Vitals for the past 72 hrs (Last 3 readings):   Weight   01/23/20 0441 98.7 kg (217 lb 9.5 oz)   01/22/20 0400 102.2 kg (225 lb 5 oz)   01/21/20 0846 102.5 kg (226 lb)     Body mass index is 34.08 kg/m².      Intake/Output Summary (Last 24 hours) at 1/23/2020 1129  Last data filed at 1/23/2020 0830  Gross per 24 hour   Intake 540 ml   Output 1000 ml   Net -460 ml       Hemodynamic Parameters:       Telemetry: NSR  70-80s bpm    Physical Exam   Constitutional: She is oriented to person, place, and time. She appears well-developed and well-nourished.   HENT:   Head: Normocephalic and atraumatic.   Eyes: Pupils are equal, round, and reactive to light. EOM are normal.   Neck: Normal range of motion. Neck supple. No JVD present.   Cardiovascular: Normal rate and regular rhythm.   Sternal incision C/D/I. Large right groin fluid-filled/cystic lesion noted at bypass access site, nontender, no erythema/drainage present.    Pulmonary/Chest: Effort normal and breath sounds normal. No stridor. No respiratory distress.   Decreased bibasilar BS   Abdominal: Soft. Bowel sounds are normal. She exhibits no distension. There is no tenderness.   Musculoskeletal: Edema: trace.   Neurological: She is alert and oriented to person, place, and time.   Skin: Skin is warm and dry.   Psychiatric: She has a normal mood and affect. Her behavior is normal. Judgment and thought content normal.   Nursing note and vitals reviewed.      Significant Labs:  CBC:  Recent Labs   Lab 01/21/20  0640 01/22/20  0658 01/23/20  0704   WBC 6.40 5.62 6.74   RBC 3.08* 3.11* 3.02*   HGB 8.2* 8.3* 8.0*   HCT 27.7* 28.0* 26.9*   * 312 277   MCV 90 90 89   MCH 26.6* 26.7* 26.5*   MCHC 29.6* 29.6* 29.7*     BNP:  Recent Labs   Lab 01/20/20  0921 01/21/20  0640   * 486*     CMP:  Recent Labs   Lab 01/21/20  0640 01/22/20  0658 01/23/20  0703   GLU 96 88 99   CALCIUM 8.7 8.8 8.5*   ALBUMIN 3.2* 3.0* 3.1*   PROT 6.0 5.6* 5.7*    143 141   K 3.6 3.6 3.4*   CO2 22* 23 23    110 108   BUN 23* 22* 24*   CREATININE 2.2* 2.3* 2.6*   ALKPHOS 74 73 69   ALT 9* 9* 7*   AST 9* 8* 7*   BILITOT 0.3 0.3 0.2      Coagulation:   No results for input(s): PT, INR, APTT in the last 168 hours.  LDH:  No results for input(s): LDH in the last 72 hours.  Microbiology:  Microbiology Results (last 7 days)     ** No results found for the last 168 hours. **          I have  reviewed all pertinent labs within the past 24 hours.    Estimated Creatinine Clearance: 31.2 mL/min (A) (based on SCr of 2.6 mg/dL (H)).    Diagnostic Results:  I have reviewed and interpreted all pertinent imaging results/findings within the past 24 hours.

## 2020-01-23 NOTE — PLAN OF CARE
AAOx3, afebrile, w/o c/o pain. Telemetry monitor in place (80s bpm). See previous notes regarding terbutaline. Bg monitored achs- no coverage needed. Pt desats while sleeping- oxygen applied. Pt able to position self independently.  Pt in lowest position, side rails up x2, non-skid foot wear in place, call light within reach, pt verbalized understanding to call RN when needed.  Hand hygiene practiced per protocol.  Will continue to monitor.

## 2020-01-23 NOTE — NURSING
"RN called to bedside by patient who reports she was walking her caregiver to the elevator and she made it about to the sink (estimate of about maybe 500 feet or less) outside her room and became very lightheaded. Patient described the feeling as "suddenly everything was 'off kilter' and if I didn't sit down I was going to lay out where ever we were." Patient was able to safely make it back to her room and reports feeling much better sitting down on the edge of the bed. HTS Fellow on call notified of event. No new orders given. Will continue to monitor patient closely for more events.   "

## 2020-01-23 NOTE — NURSING
Notified Dr. Valdes that pharmacy does not have terbutaline at this facility. The carrier for [facility] will need to go get a supply from [facility] tonight. MD stated to call when it arrives to see if it can be given determining the time. Will continue to monitor.

## 2020-01-23 NOTE — NURSING
Rounds Report: Attended interdisciplinary rounds this afternoon   with the transplant team including SW, physicians, fellows,  mid-level providers, and transplant coordinators.Discussed plan of care, including DC date, current medications including terbutaline.

## 2020-01-24 LAB
ALBUMIN SERPL BCP-MCNC: 3.3 G/DL (ref 3.5–5.2)
ALP SERPL-CCNC: 72 U/L (ref 55–135)
ALT SERPL W/O P-5'-P-CCNC: 8 U/L (ref 10–44)
ANION GAP SERPL CALC-SCNC: 11 MMOL/L (ref 8–16)
AST SERPL-CCNC: 8 U/L (ref 10–40)
BASOPHILS # BLD AUTO: 0.05 K/UL (ref 0–0.2)
BASOPHILS NFR BLD: 0.6 % (ref 0–1.9)
BILIRUB SERPL-MCNC: 0.2 MG/DL (ref 0.1–1)
BUN SERPL-MCNC: 24 MG/DL (ref 6–20)
CALCIUM SERPL-MCNC: 8.9 MG/DL (ref 8.7–10.5)
CHLORIDE SERPL-SCNC: 106 MMOL/L (ref 95–110)
CO2 SERPL-SCNC: 22 MMOL/L (ref 23–29)
CREAT SERPL-MCNC: 2.5 MG/DL (ref 0.5–1.4)
DIFFERENTIAL METHOD: ABNORMAL
EOSINOPHIL # BLD AUTO: 0 K/UL (ref 0–0.5)
EOSINOPHIL NFR BLD: 0.5 % (ref 0–8)
ERYTHROCYTE [DISTWIDTH] IN BLOOD BY AUTOMATED COUNT: 17.4 % (ref 11.5–14.5)
EST. GFR  (AFRICAN AMERICAN): 25.1 ML/MIN/1.73 M^2
EST. GFR  (NON AFRICAN AMERICAN): 21.7 ML/MIN/1.73 M^2
GLUCOSE SERPL-MCNC: 94 MG/DL (ref 70–110)
HCT VFR BLD AUTO: 27.1 % (ref 37–48.5)
HGB BLD-MCNC: 7.9 G/DL (ref 12–16)
IMM GRANULOCYTES # BLD AUTO: 0.13 K/UL (ref 0–0.04)
IMM GRANULOCYTES NFR BLD AUTO: 1.7 % (ref 0–0.5)
LYMPHOCYTES # BLD AUTO: 0.4 K/UL (ref 1–4.8)
LYMPHOCYTES NFR BLD: 4.7 % (ref 18–48)
MAGNESIUM SERPL-MCNC: 1.6 MG/DL (ref 1.6–2.6)
MCH RBC QN AUTO: 26.3 PG (ref 27–31)
MCHC RBC AUTO-ENTMCNC: 29.2 G/DL (ref 32–36)
MCV RBC AUTO: 90 FL (ref 82–98)
MONOCYTES # BLD AUTO: 0.5 K/UL (ref 0.3–1)
MONOCYTES NFR BLD: 6.2 % (ref 4–15)
NEUTROPHILS # BLD AUTO: 6.8 K/UL (ref 1.8–7.7)
NEUTROPHILS NFR BLD: 86.3 % (ref 38–73)
NRBC BLD-RTO: 0 /100 WBC
PLATELET # BLD AUTO: 300 K/UL (ref 150–350)
PMV BLD AUTO: 9.3 FL (ref 9.2–12.9)
POCT GLUCOSE: 178 MG/DL (ref 70–110)
POCT GLUCOSE: 213 MG/DL (ref 70–110)
POCT GLUCOSE: 220 MG/DL (ref 70–110)
POCT GLUCOSE: 265 MG/DL (ref 70–110)
POTASSIUM SERPL-SCNC: 4 MMOL/L (ref 3.5–5.1)
PROT SERPL-MCNC: 5.9 G/DL (ref 6–8.4)
RBC # BLD AUTO: 3 M/UL (ref 4–5.4)
SODIUM SERPL-SCNC: 139 MMOL/L (ref 136–145)
TACROLIMUS BLD-MCNC: 10.9 NG/ML (ref 5–15)
WBC # BLD AUTO: 7.86 K/UL (ref 3.9–12.7)

## 2020-01-24 PROCEDURE — 97530 THERAPEUTIC ACTIVITIES: CPT | Mod: CQ

## 2020-01-24 PROCEDURE — 82955 ASSAY OF G6PD ENZYME: CPT

## 2020-01-24 PROCEDURE — 99233 SBSQ HOSP IP/OBS HIGH 50: CPT | Mod: ,,, | Performed by: INTERNAL MEDICINE

## 2020-01-24 PROCEDURE — 25000003 PHARM REV CODE 250: Performed by: NURSE PRACTITIONER

## 2020-01-24 PROCEDURE — 20600001 HC STEP DOWN PRIVATE ROOM

## 2020-01-24 PROCEDURE — 85025 COMPLETE CBC W/AUTO DIFF WBC: CPT

## 2020-01-24 PROCEDURE — 80197 ASSAY OF TACROLIMUS: CPT

## 2020-01-24 PROCEDURE — 94761 N-INVAS EAR/PLS OXIMETRY MLT: CPT

## 2020-01-24 PROCEDURE — 25000003 PHARM REV CODE 250: Performed by: INTERNAL MEDICINE

## 2020-01-24 PROCEDURE — 97116 GAIT TRAINING THERAPY: CPT | Mod: CQ

## 2020-01-24 PROCEDURE — 63600175 PHARM REV CODE 636 W HCPCS: Performed by: NURSE PRACTITIONER

## 2020-01-24 PROCEDURE — 99233 PR SUBSEQUENT HOSPITAL CARE,LEVL III: ICD-10-PCS | Mod: ,,, | Performed by: INTERNAL MEDICINE

## 2020-01-24 PROCEDURE — 83735 ASSAY OF MAGNESIUM: CPT

## 2020-01-24 PROCEDURE — 80053 COMPREHEN METABOLIC PANEL: CPT

## 2020-01-24 RX ORDER — GLUCAGON 1 MG
1 KIT INJECTION
Status: DISCONTINUED | OUTPATIENT
Start: 2020-01-24 | End: 2020-01-27 | Stop reason: HOSPADM

## 2020-01-24 RX ORDER — IBUPROFEN 200 MG
16 TABLET ORAL
Status: DISCONTINUED | OUTPATIENT
Start: 2020-01-24 | End: 2020-01-27 | Stop reason: HOSPADM

## 2020-01-24 RX ORDER — INSULIN ASPART 100 [IU]/ML
0-5 INJECTION, SOLUTION INTRAVENOUS; SUBCUTANEOUS
Status: DISCONTINUED | OUTPATIENT
Start: 2020-01-24 | End: 2020-01-27 | Stop reason: HOSPADM

## 2020-01-24 RX ORDER — IBUPROFEN 200 MG
24 TABLET ORAL
Status: DISCONTINUED | OUTPATIENT
Start: 2020-01-24 | End: 2020-01-27 | Stop reason: HOSPADM

## 2020-01-24 RX ADMIN — TACROLIMUS 3 MG: 1 CAPSULE ORAL at 08:01

## 2020-01-24 RX ADMIN — PREDNISONE 10 MG: 10 TABLET ORAL at 08:01

## 2020-01-24 RX ADMIN — PANTOPRAZOLE SODIUM 40 MG: 40 TABLET, DELAYED RELEASE ORAL at 08:01

## 2020-01-24 RX ADMIN — SITAGLIPTIN 50 MG: 50 TABLET, FILM COATED ORAL at 08:01

## 2020-01-24 RX ADMIN — MYCOPHENOLATE MOFETIL 500 MG: 250 CAPSULE ORAL at 08:01

## 2020-01-24 RX ADMIN — TACROLIMUS 3 MG: 1 CAPSULE ORAL at 05:01

## 2020-01-24 RX ADMIN — TERBUTALINE SULFATE 5 MG: 2.5 TABLET ORAL at 08:01

## 2020-01-24 RX ADMIN — ZOLPIDEM TARTRATE 5 MG: 5 TABLET, COATED ORAL at 10:01

## 2020-01-24 RX ADMIN — FERROUS SULFATE TAB EC 325 MG (65 MG FE EQUIVALENT) 325 MG: 325 (65 FE) TABLET DELAYED RESPONSE at 08:01

## 2020-01-24 RX ADMIN — ZOLPIDEM TARTRATE 5 MG: 5 TABLET, COATED ORAL at 12:01

## 2020-01-24 RX ADMIN — INSULIN ASPART 2 UNITS: 100 INJECTION, SOLUTION INTRAVENOUS; SUBCUTANEOUS at 12:01

## 2020-01-24 RX ADMIN — Medication 400 MG: at 08:01

## 2020-01-24 RX ADMIN — VENLAFAXINE HYDROCHLORIDE 150 MG: 37.5 CAPSULE, EXTENDED RELEASE ORAL at 08:01

## 2020-01-24 RX ADMIN — GABAPENTIN 300 MG: 300 CAPSULE ORAL at 08:01

## 2020-01-24 RX ADMIN — OYSTER SHELL CALCIUM WITH VITAMIN D 2 TABLET: 500; 200 TABLET, FILM COATED ORAL at 08:01

## 2020-01-24 RX ADMIN — ASPIRIN 81 MG: 81 TABLET, DELAYED RELEASE ORAL at 08:01

## 2020-01-24 RX ADMIN — ATORVASTATIN CALCIUM 20 MG: 20 TABLET, FILM COATED ORAL at 08:01

## 2020-01-24 RX ADMIN — AMLODIPINE BESYLATE 10 MG: 10 TABLET ORAL at 08:01

## 2020-01-24 RX ADMIN — TERBUTALINE SULFATE 5 MG: 2.5 TABLET ORAL at 02:01

## 2020-01-24 RX ADMIN — INSULIN ASPART 3 UNITS: 100 INJECTION, SOLUTION INTRAVENOUS; SUBCUTANEOUS at 04:01

## 2020-01-24 NOTE — SUBJECTIVE & OBJECTIVE
Interval History: Feeling pretty well this am. Did not sleep great last night. Nausea is completely resolved. Feels like her appetite is better too. Still having dyspnea with exertion though. She can't comment on dizziness this am as she hasn't been walking yet.     Scheduled Meds:   amLODIPine  10 mg Oral Daily    aspirin  81 mg Oral Daily    atorvastatin  20 mg Oral Daily    calcium-vitamin D3  2 tablet Oral Daily    ferrous sulfate  325 mg Oral BID    gabapentin  300 mg Oral QHS    magnesium oxide  400 mg Oral Daily    mycophenolate  500 mg Oral BID    pantoprazole  40 mg Oral Daily    predniSONE  10 mg Oral Daily    SITagliptin  50 mg Oral Daily    tacrolimus  3 mg Oral BID    terbutaline  5 mg Oral TID    valGANciclovir  450 mg Oral Every other day    venlafaxine  150 mg Oral Daily     PRN Meds:Dextrose 10% Bolus, Dextrose 10% Bolus, glucagon (human recombinant), glucose, glucose, insulin aspart U-100, ondansetron, oxyCODONE, promethazine (PHENERGAN) IVPB, senna-docusate 8.6-50 mg, zolpidem    Review of patient's allergies indicates:   Allergen Reactions    Adhesive Blisters     Reaction to area in chest and up only    Codeine Itching     Objective:     Vital Signs (Most Recent):  Temp: 98.7 °F (37.1 °C) (01/24/20 0730)  Pulse: 88 (01/24/20 0733)  Resp: 20 (01/24/20 0730)  BP: 121/74 (01/24/20 0730)  SpO2: (!) 93 % (01/24/20 0730) Vital Signs (24h Range):  Temp:  [98.2 °F (36.8 °C)-98.7 °F (37.1 °C)] 98.7 °F (37.1 °C)  Pulse:  [80-90] 88  Resp:  [16-23] 20  SpO2:  [89 %-96 %] 93 %  BP: (110-151)/(68-91) 121/74     Patient Vitals for the past 72 hrs (Last 3 readings):   Weight   01/24/20 0500 98.4 kg (216 lb 14.9 oz)   01/23/20 0441 98.7 kg (217 lb 9.5 oz)   01/22/20 0400 102.2 kg (225 lb 5 oz)     Body mass index is 33.98 kg/m².      Intake/Output Summary (Last 24 hours) at 1/24/2020 0824  Last data filed at 1/24/2020 0500  Gross per 24 hour   Intake 1380 ml   Output 1200 ml   Net 180 ml           Telemetry: NSR 80s bpm    Physical Exam   Constitutional: She is oriented to person, place, and time. She appears well-developed and well-nourished.   HENT:   Head: Normocephalic and atraumatic.   Eyes: Pupils are equal, round, and reactive to light. EOM are normal.   Neck: Normal range of motion. Neck supple. No JVD present.   Cardiovascular: Normal rate and regular rhythm.   Sternal incision C/D/I. Large right groin fluid-filled/cystic lesion noted at bypass access site, nontender, no erythema/drainage present.    Pulmonary/Chest: Effort normal and breath sounds normal. No stridor. No respiratory distress.   Decreased bibasilar BS   Abdominal: Soft. Bowel sounds are normal. She exhibits no distension. There is no tenderness.   Musculoskeletal: Edema: trace.   Neurological: She is alert and oriented to person, place, and time.   Skin: Skin is warm and dry.   Psychiatric: She has a normal mood and affect. Her behavior is normal. Judgment and thought content normal.   Nursing note and vitals reviewed.    Significant Labs:  CBC:  Recent Labs   Lab 01/22/20  0658 01/23/20  0704 01/24/20  0625   WBC 5.62 6.74 7.86   RBC 3.11* 3.02* 3.00*   HGB 8.3* 8.0* 7.9*   HCT 28.0* 26.9* 27.1*    277 300   MCV 90 89 90   MCH 26.7* 26.5* 26.3*   MCHC 29.6* 29.7* 29.2*     BNP:  Recent Labs   Lab 01/20/20  0921 01/21/20  0640   * 486*     CMP:  Recent Labs   Lab 01/22/20  0658 01/23/20  0703 01/24/20  0625   GLU 88 99 94   CALCIUM 8.8 8.5* 8.9   ALBUMIN 3.0* 3.1* 3.3*   PROT 5.6* 5.7* 5.9*    141 139   K 3.6 3.4* 4.0   CO2 23 23 22*    108 106   BUN 22* 24* 24*   CREATININE 2.3* 2.6* 2.5*   ALKPHOS 73 69 72   ALT 9* 7* 8*   AST 8* 7* 8*   BILITOT 0.3 0.2 0.2      Coagulation:   No results for input(s): PT, INR, APTT in the last 168 hours.  LDH:  No results for input(s): LDH in the last 72 hours.  Microbiology:  Microbiology Results (last 7 days)     ** No results found for the last 168 hours. **        I  have reviewed all pertinent labs within the past 24 hours.    Estimated Creatinine Clearance: 32.4 mL/min (A) (based on SCr of 2.5 mg/dL (H)).    Diagnostic Results:  I have reviewed and interpreted all pertinent imaging results/findings within the past 24 hours.

## 2020-01-24 NOTE — PLAN OF CARE
Problem: Physical Therapy Goal  Goal: Physical Therapy Goal  Description  Goals to be met by: 2020     Patient will increase functional independence with mobility by performin. Supine to sit with Set-up [place]  2. Sit to supine with Set-up [place]  3. Sit to stand transfer with Supervision  4. Bed to chair transfer with Supervision  5. Gait  x 200 feet with Supervision without rest breaks.   6. Lower extremity exercise program x15 reps per handout, with supervision     Outcome: Ongoing, Progressing   Pt progressing towards goals. continue with PT POC.Goals remain appropriate.  Trevin Tripathi PTA

## 2020-01-24 NOTE — PT/OT/SLP PROGRESS
Physical Therapy Treatment    Patient Name:  Deborah Navas   MRN:  [id number]    Recommendations:     Discharge Recommendations:  outpatient PT   Discharge Equipment Recommendations: shower chair   Barriers to discharge: None    Assessment:     Deborah Navas is a 49 y.o. female admitted with a medical diagnosis of Heart transplanted.  She presents with the following impairments/functional limitations:  weakness, impaired endurance, impaired self care skills, gait instability, impaired functional mobilty, impaired balance, impaired cardiopulmonary response to activity .  Pt continues to remain motivated and cooperative with treatment session. Pt Progressing with PT Intervention.  Pt would continue to benefit from skilled PT to address overall functional mobility and goals. Goals remain appropriate  Rehab Prognosis: Good; patient would benefit from acute skilled PT services to address these deficits and reach maximum level of function.    Recent Surgery: * No surgery found *      Plan:     During this hospitalization, patient to be seen 3 x/week to address the identified rehab impairments via gait training, therapeutic activities, therapeutic exercises and progress toward the following goals:    · Plan of Care Expires:  02/21/20    Subjective     Chief Complaint: SOB and fatigue    Pain/Comfort:  · Pain Rating 1: 0/10  · Pain Rating Post-Intervention 1: 0/10      Objective:     Communicated with RN prior to session.  Patient found seated with telemetry upon PT entry to room.     General Precautions: Standard, fall, sternal   Orthopedic Precautions:N/A   Braces: N/A     Functional Mobility:  · Transfers:     · Sit to Stand:  stand by assistance with no AD  · Gait: 140 ft x 2  with SBA without AD with mask on. Pt.required extended seated rest break       AM-PAC 6 CLICK MOBILITY  Turning over in bed (including adjusting bedclothes, sheets and blankets)?: 4  Sitting down on and standing up from a chair  with arms (e.g., wheelchair, bedside commode, etc.): 4  Moving from lying on back to sitting on the side of the bed?: 4  Moving to and from a bed to a chair (including a wheelchair)?: 3  Need to walk in hospital room?: 3  Climbing 3-5 steps with a railing?: 3  Basic Mobility Total Score: 21       Therapeutic Activities and Exercises:   educated patient on progress, safety,d/c,PT POC, on the effects of prolonged immobility and the importance of performing OOB activity and exercises to promote healing and reduce recovery time   Patient  therex seated  B LE AROM   Updated white board with appropriate PT mobility information for medical team notification  Donned an extra gown   Pt encouraged to ambulate in hallways 3x/day with nursing or family assistance to improve endurance.   Pt safe to ambulate in hallway with RN or PCT assistance   Bedside table in front of patient and area set up for function, convenience, and safety. RN aware of patient's mobility needs and status. Questions/concerns addressed within PTA scope of practice; patient with no further questions. Time was provided for active listening, discussion of health disposition, and discussion of safe discharge. Pt?verbalized?agreement .      Patient left up in chair with all lines intact, call button in reach and nsg notified..    GOALS:   Multidisciplinary Problems     Physical Therapy Goals        Problem: Physical Therapy Goal    Goal Priority Disciplines Outcome Goal Variances Interventions   Physical Therapy Goal     PT, PT/OT Ongoing, Progressing     Description:  Goals to be met by: 2020     Patient will increase functional independence with mobility by performin. Supine to sit with Set-up [place]  2. Sit to supine with Set-up [place]  3. Sit to stand transfer with Supervision  4. Bed to chair transfer with Supervision  5. Gait  x 200 feet with Supervision without rest breaks.   6. Lower extremity exercise program x15 reps per  handout, with supervision                      Time Tracking:     PT Received On: 01/24/20  PT Start Time: 0828     PT Stop Time: 0909  PT Total Time (min): 41 min     Billable Minutes: Gait Training 28 and Therapeutic Activity 12    Treatment Type: Treatment  PT/PTA: PTA     PTA Visit Number: 1     Trevin Tripathi, PTA  01/24/2020

## 2020-01-24 NOTE — NURSING
Rounds Report: Attended interdisciplinary rounds this afternoon with the transplant team including SW, physicians, fellows,  mid-level providers, and transplant coordinators.Discussed plan of care, including DC date, current medications and current plan to proceed with egbx Tuesday and obtain PHS labs on Monday.

## 2020-01-24 NOTE — PHYSICIAN QUERY
PT Name: Deborah Navas  MR #: [id number]  Physician Query Form - CKD Clarification     CDS/: Zoltan Jean-Baptiste Jr, RN               Contact information:[email or web address]  This form is a permanent document in the medical record.     Query Date: January 24, 2020    By submitting this query, we are merely seeking further clarification of documentation. Please utilize your independent clinical judgment when addressing the question(s) below.    The Medical record contains the following:     Indicators   Supporting Clinical Findings   Location in Medical Record   x CKD or Chronic Kidney (Renal) Failure / Disease CKD (chronic kidney disease)  -Cr trending down.  -Holding lasix. d/c'd bactrim.  -Will continue to monitor.    1/24 Heart Transplant Progress Note   x BUN/Creatinine                          GFR BUN=26    Creatinine=2.3    GFR=24.1    Cr actually trending down since last admission.   1/20 Labs    1/20 Labs    1/20 Labs    1/20 H&P    Dehydration      Nausea / Vomiting      Dialysis / CRRT      Medication      Treatment      Other Chronic Conditions      Other       Provider, please further specify the stage of CKD.     [employer] Definitions     Stage Description eGFR (mL/min)   [   ]    I Slight kidney damage with normal or increased filtration 90+   [   ]   II Mildly reduced kidney function 60-89   [   x]    III Moderately reduced kidney function 30-59   [   ] Other (please specify): ____________    [   ]  Clinically Undetermined          Please document in your progress notes daily for the duration of treatment until resolved and include in your discharge summary.

## 2020-01-24 NOTE — PROGRESS NOTES
[facility]  Heart Transplant  Progress Note    Patient Name: Deborah Navas  MRN: [id number]  Admission Date: 1/20/2020  Hospital Length of Stay: 2 days  Attending Physician: Renetta Chaudhari MD  Primary Care Provider: Valeria Verma MD  Principal Problem:Heart transplanted    Subjective:     Interval History: Feeling pretty well this am. Did not sleep great last night. Nausea is completely resolved. Feels like her appetite is better too. Still having dyspnea with exertion though. She can't comment on dizziness this am as she hasn't been walking yet.     Scheduled Meds:   amLODIPine  10 mg Oral Daily    aspirin  81 mg Oral Daily    atorvastatin  20 mg Oral Daily    calcium-vitamin D3  2 tablet Oral Daily    ferrous sulfate  325 mg Oral BID    gabapentin  300 mg Oral QHS    magnesium oxide  400 mg Oral Daily    mycophenolate  500 mg Oral BID    pantoprazole  40 mg Oral Daily    predniSONE  10 mg Oral Daily    SITagliptin  50 mg Oral Daily    tacrolimus  3 mg Oral BID    terbutaline  5 mg Oral TID    valGANciclovir  450 mg Oral Every other day    venlafaxine  150 mg Oral Daily     PRN Meds:Dextrose 10% Bolus, Dextrose 10% Bolus, glucagon (human recombinant), glucose, glucose, insulin aspart U-100, ondansetron, oxyCODONE, promethazine (PHENERGAN) IVPB, senna-docusate 8.6-50 mg, zolpidem    Review of patient's allergies indicates:   Allergen Reactions    Adhesive Blisters     Reaction to area in chest and up only    Codeine Itching     Objective:     Vital Signs (Most Recent):  Temp: 98.7 °F (37.1 °C) (01/24/20 0730)  Pulse: 88 (01/24/20 0733)  Resp: 20 (01/24/20 0730)  BP: 121/74 (01/24/20 0730)  SpO2: (!) 93 % (01/24/20 0730) Vital Signs (24h Range):  Temp:  [98.2 °F (36.8 °C)-98.7 °F (37.1 °C)] 98.7 °F (37.1 °C)  Pulse:  [80-90] 88  Resp:  [16-23] 20  SpO2:  [89 %-96 %] 93 %  BP: (110-151)/(68-91) 121/74     Patient Vitals for the past 72 hrs (Last 3 readings):   Weight    01/24/20 0500 98.4 kg (216 lb 14.9 oz)   01/23/20 0441 98.7 kg (217 lb 9.5 oz)   01/22/20 0400 102.2 kg (225 lb 5 oz)     Body mass index is 33.98 kg/m².      Intake/Output Summary (Last 24 hours) at 1/24/2020 0824  Last data filed at 1/24/2020 0500  Gross per 24 hour   Intake 1380 ml   Output 1200 ml   Net 180 ml          Telemetry: NSR 80s bpm    Physical Exam   Constitutional: She is oriented to person, place, and time. She appears well-developed and well-nourished.   HENT:   Head: Normocephalic and atraumatic.   Eyes: Pupils are equal, round, and reactive to light. EOM are normal.   Neck: Normal range of motion. Neck supple. No JVD present.   Cardiovascular: Normal rate and regular rhythm.   Sternal incision C/D/I. Large right groin fluid-filled/cystic lesion noted at bypass access site, nontender, no erythema/drainage present.    Pulmonary/Chest: Effort normal and breath sounds normal. No stridor. No respiratory distress.   Decreased bibasilar BS   Abdominal: Soft. Bowel sounds are normal. She exhibits no distension. There is no tenderness.   Musculoskeletal: Edema: trace.   Neurological: She is alert and oriented to person, place, and time.   Skin: Skin is warm and dry.   Psychiatric: She has a normal mood and affect. Her behavior is normal. Judgment and thought content normal.   Nursing note and vitals reviewed.    Significant Labs:  CBC:  Recent Labs   Lab 01/22/20  0658 01/23/20  0704 01/24/20  0625   WBC 5.62 6.74 7.86   RBC 3.11* 3.02* 3.00*   HGB 8.3* 8.0* 7.9*   HCT 28.0* 26.9* 27.1*    277 300   MCV 90 89 90   MCH 26.7* 26.5* 26.3*   MCHC 29.6* 29.7* 29.2*     BNP:  Recent Labs   Lab 01/20/20  0921 01/21/20  0640   * 486*     CMP:  Recent Labs   Lab 01/22/20  0658 01/23/20  0703 01/24/20  0625   GLU 88 99 94   CALCIUM 8.8 8.5* 8.9   ALBUMIN 3.0* 3.1* 3.3*   PROT 5.6* 5.7* 5.9*    141 139   K 3.6 3.4* 4.0   CO2 23 23 22*    108 106   BUN 22* 24* 24*   CREATININE 2.3* 2.6*  2.5*   ALKPHOS 73 69 72   ALT 9* 7* 8*   AST 8* 7* 8*   BILITOT 0.3 0.2 0.2      Coagulation:   No results for input(s): PT, INR, APTT in the last 168 hours.  LDH:  No results for input(s): LDH in the last 72 hours.  Microbiology:  Microbiology Results (last 7 days)     ** No results found for the last 168 hours. **        I have reviewed all pertinent labs within the past 24 hours.    Estimated Creatinine Clearance: 32.4 mL/min (A) (based on SCr of 2.5 mg/dL (H)).    Diagnostic Results:  I have reviewed and interpreted all pertinent imaging results/findings within the past 24 hours.    Assessment and Plan:     50yo F s/p OHTx on 12/16/2019 for NICM/ HFrEF with HM3 on 9/2019, removed at time of transplant, HLD, obesity, and OA, who presented for worsening nausea and continued dyspnea on exertion. The pathology of native heart revealed non-caseating granulomas typical of sarcoidosis. Was recently admitted with similar symptoms/GLO in which she was diuresed and BP medications adjusted with improvement in renal function.  RHC on 1/7 with RA 12, wedge 15 and high CO/CI. TTE revealed LVEF 65% and normal RV function, mild MR, mod-sev TR, IVC not seen; no pericardial effusion appreciated. She also had significant tremors on admission and theophylline levels were found to be severely elevated so it was discontinued, with some improvement. EMB from 12/31 and 1/7 were both negative for Ab or cell-mediated rejection. She states that she did OK when initially discharged but has been getting progressively more nauseous over the last few days. Denies any sick contacts. No diarrhea. Vomiting occasionally but mostly dry heaves. She also still complains of dyspnea with minimal exertion which has not imrpoved since surgery.     * Heart transplanted  -Transplant Date 12/16/2019. HM 3 implanted 9/10/19 removed during transplant.    -Transplanted by: Dr. Nuñez.  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  -Prednisone  10 mg daily,  BID (decreased); Prograf 3/3.   -Biopsy neg 1/7 and 1/14.  -Prophylaxis-  Valcyte.  Stopped Bactrim. Given dose of pentamadine on 1/23.   -Relative bradycardia: did not tolerate theophylline previously (elevated levels and tremors). Started terbutaline.    Nausea  -Resolved.   -Anti emetics prn.  -CMV (-).  -MMF dose decreased on admission, bactrim held.      Postural dizziness with presyncope  -Stopped hydralazine.  -Continue amlodipine for now.     CKD (chronic kidney disease)  -Cr trending down.  -Holding lasix. d/c'd bactrim.  -Will continue to monitor.        Americo Albert NP  Heart Transplant  [facility]

## 2020-01-24 NOTE — ASSESSMENT & PLAN NOTE
-Transplant Date 12/16/2019. HM 3 implanted 9/10/19 removed during transplant.    -Transplanted by: Dr. Nuñez.  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  -Prednisone 10 mg daily,  BID (decreased); Prograf 3/3.   -Biopsy neg 1/7 and 1/14.  -Prophylaxis-  Valcyte. Stopped Bactrim. Given dose of pentamadine on 1/23.   -Relative bradycardia: did not tolerate theophylline previously (elevated levels and tremors). Started terbutaline.

## 2020-01-24 NOTE — PLAN OF CARE
Pt aao x4. Call bell within reach. She is up independent around room. Ambulated in hallway with PT today. Will continue to monitor.

## 2020-01-25 LAB
ALBUMIN SERPL BCP-MCNC: 3.4 G/DL (ref 3.5–5.2)
ALP SERPL-CCNC: 75 U/L (ref 55–135)
ALT SERPL W/O P-5'-P-CCNC: 9 U/L (ref 10–44)
ANION GAP SERPL CALC-SCNC: 11 MMOL/L (ref 8–16)
AST SERPL-CCNC: 8 U/L (ref 10–40)
BASOPHILS # BLD AUTO: 0.05 K/UL (ref 0–0.2)
BASOPHILS NFR BLD: 0.6 % (ref 0–1.9)
BILIRUB SERPL-MCNC: 0.3 MG/DL (ref 0.1–1)
BNP SERPL-MCNC: 308 PG/ML (ref 0–99)
BUN SERPL-MCNC: 28 MG/DL (ref 6–20)
CALCIUM SERPL-MCNC: 9.5 MG/DL (ref 8.7–10.5)
CHLORIDE SERPL-SCNC: 107 MMOL/L (ref 95–110)
CO2 SERPL-SCNC: 24 MMOL/L (ref 23–29)
CREAT SERPL-MCNC: 2.5 MG/DL (ref 0.5–1.4)
DIFFERENTIAL METHOD: ABNORMAL
EOSINOPHIL # BLD AUTO: 0 K/UL (ref 0–0.5)
EOSINOPHIL NFR BLD: 0.5 % (ref 0–8)
ERYTHROCYTE [DISTWIDTH] IN BLOOD BY AUTOMATED COUNT: 17.7 % (ref 11.5–14.5)
EST. GFR  (AFRICAN AMERICAN): 25.1 ML/MIN/1.73 M^2
EST. GFR  (NON AFRICAN AMERICAN): 21.7 ML/MIN/1.73 M^2
GLUCOSE SERPL-MCNC: 108 MG/DL (ref 70–110)
HCT VFR BLD AUTO: 27.8 % (ref 37–48.5)
HGB BLD-MCNC: 8.2 G/DL (ref 12–16)
IMM GRANULOCYTES # BLD AUTO: 0.15 K/UL (ref 0–0.04)
IMM GRANULOCYTES NFR BLD AUTO: 1.9 % (ref 0–0.5)
LYMPHOCYTES # BLD AUTO: 0.3 K/UL (ref 1–4.8)
LYMPHOCYTES NFR BLD: 3.1 % (ref 18–48)
MAGNESIUM SERPL-MCNC: 1.6 MG/DL (ref 1.6–2.6)
MCH RBC QN AUTO: 26.8 PG (ref 27–31)
MCHC RBC AUTO-ENTMCNC: 29.5 G/DL (ref 32–36)
MCV RBC AUTO: 91 FL (ref 82–98)
MONOCYTES # BLD AUTO: 0.5 K/UL (ref 0.3–1)
MONOCYTES NFR BLD: 6.5 % (ref 4–15)
NEUTROPHILS # BLD AUTO: 7 K/UL (ref 1.8–7.7)
NEUTROPHILS NFR BLD: 87.4 % (ref 38–73)
NRBC BLD-RTO: 0 /100 WBC
PLATELET # BLD AUTO: 300 K/UL (ref 150–350)
PMV BLD AUTO: 9.1 FL (ref 9.2–12.9)
POCT GLUCOSE: 113 MG/DL (ref 70–110)
POCT GLUCOSE: 153 MG/DL (ref 70–110)
POCT GLUCOSE: 193 MG/DL (ref 70–110)
POCT GLUCOSE: 253 MG/DL (ref 70–110)
POTASSIUM SERPL-SCNC: 4 MMOL/L (ref 3.5–5.1)
PROT SERPL-MCNC: 6 G/DL (ref 6–8.4)
RBC # BLD AUTO: 3.06 M/UL (ref 4–5.4)
SODIUM SERPL-SCNC: 142 MMOL/L (ref 136–145)
TACROLIMUS BLD-MCNC: 11.3 NG/ML (ref 5–15)
WBC # BLD AUTO: 8.03 K/UL (ref 3.9–12.7)

## 2020-01-25 PROCEDURE — 85025 COMPLETE CBC W/AUTO DIFF WBC: CPT

## 2020-01-25 PROCEDURE — 99233 SBSQ HOSP IP/OBS HIGH 50: CPT | Mod: ,,, | Performed by: INTERNAL MEDICINE

## 2020-01-25 PROCEDURE — 80197 ASSAY OF TACROLIMUS: CPT

## 2020-01-25 PROCEDURE — 25000003 PHARM REV CODE 250: Performed by: NURSE PRACTITIONER

## 2020-01-25 PROCEDURE — 87517 HEPATITIS B DNA QUANT: CPT

## 2020-01-25 PROCEDURE — 87522 HEPATITIS C REVRS TRNSCRPJ: CPT

## 2020-01-25 PROCEDURE — 63600175 PHARM REV CODE 636 W HCPCS: Performed by: NURSE PRACTITIONER

## 2020-01-25 PROCEDURE — 87340 HEPATITIS B SURFACE AG IA: CPT

## 2020-01-25 PROCEDURE — 20600001 HC STEP DOWN PRIVATE ROOM

## 2020-01-25 PROCEDURE — 83880 ASSAY OF NATRIURETIC PEPTIDE: CPT

## 2020-01-25 PROCEDURE — 83735 ASSAY OF MAGNESIUM: CPT

## 2020-01-25 PROCEDURE — 86703 HIV-1/HIV-2 1 RESULT ANTBDY: CPT

## 2020-01-25 PROCEDURE — 94761 N-INVAS EAR/PLS OXIMETRY MLT: CPT

## 2020-01-25 PROCEDURE — 99233 PR SUBSEQUENT HOSPITAL CARE,LEVL III: ICD-10-PCS | Mod: ,,, | Performed by: INTERNAL MEDICINE

## 2020-01-25 PROCEDURE — 80053 COMPREHEN METABOLIC PANEL: CPT

## 2020-01-25 PROCEDURE — 25000003 PHARM REV CODE 250: Performed by: INTERNAL MEDICINE

## 2020-01-25 RX ADMIN — VALGANCICLOVIR 450 MG: 450 TABLET, FILM COATED ORAL at 08:01

## 2020-01-25 RX ADMIN — SENNOSIDES AND DOCUSATE SODIUM 2 TABLET: 8.6; 5 TABLET ORAL at 03:01

## 2020-01-25 RX ADMIN — VENLAFAXINE HYDROCHLORIDE 150 MG: 37.5 CAPSULE, EXTENDED RELEASE ORAL at 08:01

## 2020-01-25 RX ADMIN — MYCOPHENOLATE MOFETIL 500 MG: 250 CAPSULE ORAL at 08:01

## 2020-01-25 RX ADMIN — SITAGLIPTIN 50 MG: 50 TABLET, FILM COATED ORAL at 08:01

## 2020-01-25 RX ADMIN — TACROLIMUS 3 MG: 1 CAPSULE ORAL at 05:01

## 2020-01-25 RX ADMIN — TERBUTALINE SULFATE 5 MG: 2.5 TABLET ORAL at 03:01

## 2020-01-25 RX ADMIN — FERROUS SULFATE TAB EC 325 MG (65 MG FE EQUIVALENT) 325 MG: 325 (65 FE) TABLET DELAYED RESPONSE at 08:01

## 2020-01-25 RX ADMIN — AMLODIPINE BESYLATE 10 MG: 10 TABLET ORAL at 08:01

## 2020-01-25 RX ADMIN — TERBUTALINE SULFATE 5 MG: 2.5 TABLET ORAL at 08:01

## 2020-01-25 RX ADMIN — Medication 400 MG: at 08:01

## 2020-01-25 RX ADMIN — ZOLPIDEM TARTRATE 5 MG: 5 TABLET, COATED ORAL at 10:01

## 2020-01-25 RX ADMIN — OYSTER SHELL CALCIUM WITH VITAMIN D 2 TABLET: 500; 200 TABLET, FILM COATED ORAL at 08:01

## 2020-01-25 RX ADMIN — PANTOPRAZOLE SODIUM 40 MG: 40 TABLET, DELAYED RELEASE ORAL at 08:01

## 2020-01-25 RX ADMIN — GABAPENTIN 300 MG: 300 CAPSULE ORAL at 08:01

## 2020-01-25 RX ADMIN — ASPIRIN 81 MG: 81 TABLET, DELAYED RELEASE ORAL at 08:01

## 2020-01-25 RX ADMIN — ATORVASTATIN CALCIUM 20 MG: 20 TABLET, FILM COATED ORAL at 08:01

## 2020-01-25 RX ADMIN — PREDNISONE 10 MG: 10 TABLET ORAL at 08:01

## 2020-01-25 RX ADMIN — TACROLIMUS 3 MG: 1 CAPSULE ORAL at 08:01

## 2020-01-25 RX ADMIN — INSULIN ASPART 3 UNITS: 100 INJECTION, SOLUTION INTRAVENOUS; SUBCUTANEOUS at 05:01

## 2020-01-25 NOTE — PLAN OF CARE
Patient aao, independent with ambulation. LEGER.  R groin fluid collection stable. PIV rotated today. SR ST per tele.

## 2020-01-25 NOTE — PROGRESS NOTES
[facility]  Heart Transplant  Progress Note    Patient Name: Deborah Navas  MRN: [id number]  Admission Date: 1/20/2020  Hospital Length of Stay: 3 days  Attending Physician: Renetta Chaudhari MD  Primary Care Provider: Valeria Verma MD  Principal Problem:Heart transplanted    Subjective:     Interval History: Nausea and dizziness resolved. SOB on exertion improved. Concerned about right groin fluid collection.     Continuous Infusions:  Scheduled Meds:   amLODIPine  10 mg Oral Daily    aspirin  81 mg Oral Daily    atorvastatin  20 mg Oral Daily    calcium-vitamin D3  2 tablet Oral Daily    ferrous sulfate  325 mg Oral BID    gabapentin  300 mg Oral QHS    magnesium oxide  400 mg Oral Daily    mycophenolate  500 mg Oral BID    pantoprazole  40 mg Oral Daily    predniSONE  10 mg Oral Daily    SITagliptin  50 mg Oral Daily    tacrolimus  3 mg Oral BID    terbutaline  5 mg Oral TID    valGANciclovir  450 mg Oral Every other day    venlafaxine  150 mg Oral Daily     PRN Meds:Dextrose 10% Bolus, Dextrose 10% Bolus, glucagon (human recombinant), glucose, glucose, insulin aspart U-100, ondansetron, oxyCODONE, promethazine (PHENERGAN) IVPB, senna-docusate 8.6-50 mg, zolpidem    Review of patient's allergies indicates:   Allergen Reactions    Adhesive Blisters     Reaction to area in chest and up only    Codeine Itching     Objective:     Vital Signs (Most Recent):  Temp: 98 °F (36.7 °C) (01/25/20 0420)  Pulse: 86 (01/25/20 0420)  Resp: (!) 21 (01/25/20 0420)  BP: 129/79 (01/25/20 0420)  SpO2: (!) 94 % (01/25/20 0420) Vital Signs (24h Range):  Temp:  [98 °F (36.7 °C)-98.4 °F (36.9 °C)] 98 °F (36.7 °C)  Pulse:  [84-91] 86  Resp:  [15-26] 21  SpO2:  [94 %-98 %] 94 %  BP: (118-141)/(72-87) 129/79     Patient Vitals for the past 72 hrs (Last 3 readings):   Weight   01/25/20 0600 99.9 kg (220 lb 2.1 oz)   01/24/20 0500 98.4 kg (216 lb 14.9 oz)   01/23/20 0441 98.7 kg (217 lb 9.5 oz)      Body mass index is 34.48 kg/m².      Intake/Output Summary (Last 24 hours) at 1/25/2020 0925  Last data filed at 1/25/2020 0600  Gross per 24 hour   Intake 1020 ml   Output 750 ml   Net 270 ml       Physical Exam   Constitutional: She is oriented to person, place, and time. She appears well-developed and well-nourished.   HENT:   Head: Normocephalic and atraumatic.   Eyes: Pupils are equal, round, and reactive to light. EOM are normal.   Neck: Normal range of motion. Neck supple. No JVD present.   Cardiovascular: Normal rate and regular rhythm.   Sternal incision C/D/I. Large right groin fluid-filled/cystic lesion noted at bypass access site, nontender, no erythema/drainage present.    Pulmonary/Chest: Effort normal and breath sounds normal. No stridor. No respiratory distress.   Decreased bibasilar BS   Abdominal: Soft. Bowel sounds are normal. She exhibits no distension. There is no tenderness.   Musculoskeletal: Edema: trace.   Neurological: She is alert and oriented to person, place, and time.   Skin: Skin is warm and dry.   Psychiatric: She has a normal mood and affect. Her behavior is normal. Judgment and thought content normal.   Nursing note and vitals reviewed.      Significant Labs:  CBC:  Recent Labs   Lab 01/23/20  0704 01/24/20  0625 01/25/20  0654   WBC 6.74 7.86 8.03   RBC 3.02* 3.00* 3.06*   HGB 8.0* 7.9* 8.2*   HCT 26.9* 27.1* 27.8*    300 300   MCV 89 90 91   MCH 26.5* 26.3* 26.8*   MCHC 29.7* 29.2* 29.5*     BNP:  Recent Labs   Lab 01/20/20  0921 01/21/20  0640 01/25/20  0654   * 486* 308*     CMP:  Recent Labs   Lab 01/23/20  0703 01/24/20  0625 01/25/20  0654   GLU 99 94 108   CALCIUM 8.5* 8.9 9.5   ALBUMIN 3.1* 3.3* 3.4*   PROT 5.7* 5.9* 6.0    139 142   K 3.4* 4.0 4.0   CO2 23 22* 24    106 107   BUN 24* 24* 28*   CREATININE 2.6* 2.5* 2.5*   ALKPHOS 69 72 75   ALT 7* 8* 9*   AST 7* 8* 8*   BILITOT 0.2 0.2 0.3      Coagulation:   No results for input(s): PT, INR,  APTT in the last 168 hours.  LDH:  No results for input(s): LDH in the last 72 hours.  Microbiology:  Microbiology Results (last 7 days)     ** No results found for the last 168 hours. **          I have reviewed all pertinent labs within the past 24 hours.    Estimated Creatinine Clearance: 32.7 mL/min (A) (based on SCr of 2.5 mg/dL (H)).    Diagnostic Results:  I have reviewed all pertinent imaging results/findings within the past 24 hours.    Assessment and Plan:     50yo F s/p OHTx on 12/16/2019 for NICM/ HFrEF with HM3 on 9/2019, removed at time of transplant, HLD, obesity, and OA, who presented for worsening nausea and continued dyspnea on exertion. The pathology of native heart revealed non-caseating granulomas typical of sarcoidosis. Was recently admitted with similar symptoms/GLO in which she was diuresed and BP medications adjusted with improvement in renal function.  RHC on 1/7 with RA 12, wedge 15 and high CO/CI. TTE revealed LVEF 65% and normal RV function, mild MR, mod-sev TR, IVC not seen; no pericardial effusion appreciated. She also had significant tremors on admission and theophylline levels were found to be severely elevated so it was discontinued, with some improvement. EMB from 12/31 and 1/7 were both negative for Ab or cell-mediated rejection. She states that she did OK when initially discharged but has been getting progressively more nauseous over the last few days. Denies any sick contacts. No diarrhea. Vomiting occasionally but mostly dry heaves. She also still complains of dyspnea with minimal exertion which has not imrpoved since surgery.     * Heart transplanted  -Transplant Date 12/16/2019. HM 3 implanted 9/10/19 removed during transplant.    -Transplanted by: Dr. Nuñez.  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  -Prednisone 10 mg daily,  BID (decreased); Prograf 3/3.   -Biopsy neg 1/7 and 1/14.  -Prophylaxis-  Valcyte. Stopped Bactrim. Given dose of  pentamadine on 1/23.   -Relative bradycardia: did not tolerate theophylline previously (elevated levels and tremors). On terbutaline.    Postural dizziness with presyncope  -Stopped hydralazine.  -Continue amlodipine for now.     Nausea  -Resolved.   -Anti emetics prn.  -CMV (-).  -MMF dose decreased on admission, bactrim held.      CKD (chronic kidney disease)  -Cr stable  - Encourage PO intake  -Holding lasix. d/c'd bactrim.  -Will continue to monitor.      Right groin fluid collection  - Will check US today    Alphonse Valdes MD  Heart Transplant  [facility]

## 2020-01-25 NOTE — PLAN OF CARE
Pt AOX3, VSS on RA, afebrile.   No c/o pain/N/V.   No SSI administered at bedtime. BCG WDL.   2+ edema to BLE. Holding lasix.  Telemonitor in place--NSR HR 80's  Pt in lowest settings, call light w/in reach, nonskid socks when ambulating, remains free from falls. NAD. WCTM.

## 2020-01-25 NOTE — SUBJECTIVE & OBJECTIVE
Interval History: Nausea and dizziness resolved. SOB on exertion improved. Concerned about right groin fluid collection.     Continuous Infusions:  Scheduled Meds:   amLODIPine  10 mg Oral Daily    aspirin  81 mg Oral Daily    atorvastatin  20 mg Oral Daily    calcium-vitamin D3  2 tablet Oral Daily    ferrous sulfate  325 mg Oral BID    gabapentin  300 mg Oral QHS    magnesium oxide  400 mg Oral Daily    mycophenolate  500 mg Oral BID    pantoprazole  40 mg Oral Daily    predniSONE  10 mg Oral Daily    SITagliptin  50 mg Oral Daily    tacrolimus  3 mg Oral BID    terbutaline  5 mg Oral TID    valGANciclovir  450 mg Oral Every other day    venlafaxine  150 mg Oral Daily     PRN Meds:Dextrose 10% Bolus, Dextrose 10% Bolus, glucagon (human recombinant), glucose, glucose, insulin aspart U-100, ondansetron, oxyCODONE, promethazine (PHENERGAN) IVPB, senna-docusate 8.6-50 mg, zolpidem    Review of patient's allergies indicates:   Allergen Reactions    Adhesive Blisters     Reaction to area in chest and up only    Codeine Itching     Objective:     Vital Signs (Most Recent):  Temp: 98 °F (36.7 °C) (01/25/20 0420)  Pulse: 86 (01/25/20 0420)  Resp: (!) 21 (01/25/20 0420)  BP: 129/79 (01/25/20 0420)  SpO2: (!) 94 % (01/25/20 0420) Vital Signs (24h Range):  Temp:  [98 °F (36.7 °C)-98.4 °F (36.9 °C)] 98 °F (36.7 °C)  Pulse:  [84-91] 86  Resp:  [15-26] 21  SpO2:  [94 %-98 %] 94 %  BP: (118-141)/(72-87) 129/79     Patient Vitals for the past 72 hrs (Last 3 readings):   Weight   01/25/20 0600 99.9 kg (220 lb 2.1 oz)   01/24/20 0500 98.4 kg (216 lb 14.9 oz)   01/23/20 0441 98.7 kg (217 lb 9.5 oz)     Body mass index is 34.48 kg/m².      Intake/Output Summary (Last 24 hours) at 1/25/2020 0925  Last data filed at 1/25/2020 0600  Gross per 24 hour   Intake 1020 ml   Output 750 ml   Net 270 ml       Physical Exam   Constitutional: She is oriented to person, place, and time. She appears well-developed and  well-nourished.   HENT:   Head: Normocephalic and atraumatic.   Eyes: Pupils are equal, round, and reactive to light. EOM are normal.   Neck: Normal range of motion. Neck supple. No JVD present.   Cardiovascular: Normal rate and regular rhythm.   Sternal incision C/D/I. Large right groin fluid-filled/cystic lesion noted at bypass access site, nontender, no erythema/drainage present.    Pulmonary/Chest: Effort normal and breath sounds normal. No stridor. No respiratory distress.   Decreased bibasilar BS   Abdominal: Soft. Bowel sounds are normal. She exhibits no distension. There is no tenderness.   Musculoskeletal: Edema: trace.   Neurological: She is alert and oriented to person, place, and time.   Skin: Skin is warm and dry.   Psychiatric: She has a normal mood and affect. Her behavior is normal. Judgment and thought content normal.   Nursing note and vitals reviewed.      Significant Labs:  CBC:  Recent Labs   Lab 01/23/20  0704 01/24/20  0625 01/25/20  0654   WBC 6.74 7.86 8.03   RBC 3.02* 3.00* 3.06*   HGB 8.0* 7.9* 8.2*   HCT 26.9* 27.1* 27.8*    300 300   MCV 89 90 91   MCH 26.5* 26.3* 26.8*   MCHC 29.7* 29.2* 29.5*     BNP:  Recent Labs   Lab 01/20/20  0921 01/21/20  0640 01/25/20  0654   * 486* 308*     CMP:  Recent Labs   Lab 01/23/20  0703 01/24/20  0625 01/25/20  0654   GLU 99 94 108   CALCIUM 8.5* 8.9 9.5   ALBUMIN 3.1* 3.3* 3.4*   PROT 5.7* 5.9* 6.0    139 142   K 3.4* 4.0 4.0   CO2 23 22* 24    106 107   BUN 24* 24* 28*   CREATININE 2.6* 2.5* 2.5*   ALKPHOS 69 72 75   ALT 7* 8* 9*   AST 7* 8* 8*   BILITOT 0.2 0.2 0.3      Coagulation:   No results for input(s): PT, INR, APTT in the last 168 hours.  LDH:  No results for input(s): LDH in the last 72 hours.  Microbiology:  Microbiology Results (last 7 days)     ** No results found for the last 168 hours. **          I have reviewed all pertinent labs within the past 24 hours.    Estimated Creatinine Clearance: 32.7 mL/min (A)  (based on SCr of 2.5 mg/dL (H)).    Diagnostic Results:  I have reviewed all pertinent imaging results/findings within the past 24 hours.

## 2020-01-25 NOTE — ASSESSMENT & PLAN NOTE
-Transplant Date 12/16/2019. HM 3 implanted 9/10/19 removed during transplant.    -Transplanted by: Dr. Nuñez.  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  -Prednisone 10 mg daily,  BID (decreased); Prograf 3/3.   -Biopsy neg 1/7 and 1/14.  -Prophylaxis-  Valcyte. Stopped Bactrim. Given dose of pentamadine on 1/23.   -Relative bradycardia: did not tolerate theophylline previously (elevated levels and tremors). On terbutaline.

## 2020-01-26 LAB
ALBUMIN SERPL BCP-MCNC: 3.3 G/DL (ref 3.5–5.2)
ALP SERPL-CCNC: 75 U/L (ref 55–135)
ALT SERPL W/O P-5'-P-CCNC: 10 U/L (ref 10–44)
ANION GAP SERPL CALC-SCNC: 12 MMOL/L (ref 8–16)
ANION GAP SERPL CALC-SCNC: 9 MMOL/L (ref 8–16)
AST SERPL-CCNC: 9 U/L (ref 10–40)
BASOPHILS # BLD AUTO: 0.04 K/UL (ref 0–0.2)
BASOPHILS NFR BLD: 0.5 % (ref 0–1.9)
BILIRUB SERPL-MCNC: 0.3 MG/DL (ref 0.1–1)
BUN SERPL-MCNC: 23 MG/DL (ref 6–20)
BUN SERPL-MCNC: 25 MG/DL (ref 6–20)
CALCIUM SERPL-MCNC: 9.2 MG/DL (ref 8.7–10.5)
CALCIUM SERPL-MCNC: 9.3 MG/DL (ref 8.7–10.5)
CHLORIDE SERPL-SCNC: 107 MMOL/L (ref 95–110)
CHLORIDE SERPL-SCNC: 107 MMOL/L (ref 95–110)
CO2 SERPL-SCNC: 24 MMOL/L (ref 23–29)
CO2 SERPL-SCNC: 25 MMOL/L (ref 23–29)
CREAT SERPL-MCNC: 1.8 MG/DL (ref 0.5–1.4)
CREAT SERPL-MCNC: 1.9 MG/DL (ref 0.5–1.4)
DIFFERENTIAL METHOD: ABNORMAL
EOSINOPHIL # BLD AUTO: 0 K/UL (ref 0–0.5)
EOSINOPHIL NFR BLD: 0.5 % (ref 0–8)
ERYTHROCYTE [DISTWIDTH] IN BLOOD BY AUTOMATED COUNT: 17.7 % (ref 11.5–14.5)
EST. GFR  (AFRICAN AMERICAN): 34.9 ML/MIN/1.73 M^2
EST. GFR  (AFRICAN AMERICAN): 37.3 ML/MIN/1.73 M^2
EST. GFR  (NON AFRICAN AMERICAN): 30.3 ML/MIN/1.73 M^2
EST. GFR  (NON AFRICAN AMERICAN): 32.4 ML/MIN/1.73 M^2
GLUCOSE SERPL-MCNC: 193 MG/DL (ref 70–110)
GLUCOSE SERPL-MCNC: 97 MG/DL (ref 70–110)
HCT VFR BLD AUTO: 27.8 % (ref 37–48.5)
HGB BLD-MCNC: 8.2 G/DL (ref 12–16)
IMM GRANULOCYTES # BLD AUTO: 0.11 K/UL (ref 0–0.04)
IMM GRANULOCYTES NFR BLD AUTO: 1.5 % (ref 0–0.5)
LYMPHOCYTES # BLD AUTO: 0.3 K/UL (ref 1–4.8)
LYMPHOCYTES NFR BLD: 3.8 % (ref 18–48)
MAGNESIUM SERPL-MCNC: 1.5 MG/DL (ref 1.6–2.6)
MCH RBC QN AUTO: 26.7 PG (ref 27–31)
MCHC RBC AUTO-ENTMCNC: 29.5 G/DL (ref 32–36)
MCV RBC AUTO: 91 FL (ref 82–98)
MONOCYTES # BLD AUTO: 0.6 K/UL (ref 0.3–1)
MONOCYTES NFR BLD: 8 % (ref 4–15)
NEUTROPHILS # BLD AUTO: 6.3 K/UL (ref 1.8–7.7)
NEUTROPHILS NFR BLD: 85.7 % (ref 38–73)
NRBC BLD-RTO: 0 /100 WBC
PLATELET # BLD AUTO: 350 K/UL (ref 150–350)
PMV BLD AUTO: 9.3 FL (ref 9.2–12.9)
POCT GLUCOSE: 108 MG/DL (ref 70–110)
POCT GLUCOSE: 187 MG/DL (ref 70–110)
POCT GLUCOSE: 231 MG/DL (ref 70–110)
POTASSIUM SERPL-SCNC: 3.9 MMOL/L (ref 3.5–5.1)
POTASSIUM SERPL-SCNC: 4.2 MMOL/L (ref 3.5–5.1)
PROT SERPL-MCNC: 6 G/DL (ref 6–8.4)
RBC # BLD AUTO: 3.07 M/UL (ref 4–5.4)
SODIUM SERPL-SCNC: 141 MMOL/L (ref 136–145)
SODIUM SERPL-SCNC: 143 MMOL/L (ref 136–145)
TACROLIMUS BLD-MCNC: 8.2 NG/ML (ref 5–15)
WBC # BLD AUTO: 7.4 K/UL (ref 3.9–12.7)

## 2020-01-26 PROCEDURE — 99233 PR SUBSEQUENT HOSPITAL CARE,LEVL III: ICD-10-PCS | Mod: ,,, | Performed by: INTERNAL MEDICINE

## 2020-01-26 PROCEDURE — 80197 ASSAY OF TACROLIMUS: CPT

## 2020-01-26 PROCEDURE — 99233 SBSQ HOSP IP/OBS HIGH 50: CPT | Mod: ,,, | Performed by: INTERNAL MEDICINE

## 2020-01-26 PROCEDURE — 83735 ASSAY OF MAGNESIUM: CPT

## 2020-01-26 PROCEDURE — 80053 COMPREHEN METABOLIC PANEL: CPT

## 2020-01-26 PROCEDURE — 85025 COMPLETE CBC W/AUTO DIFF WBC: CPT

## 2020-01-26 PROCEDURE — 25000003 PHARM REV CODE 250: Performed by: NURSE PRACTITIONER

## 2020-01-26 PROCEDURE — 80048 BASIC METABOLIC PNL TOTAL CA: CPT

## 2020-01-26 PROCEDURE — 36415 COLL VENOUS BLD VENIPUNCTURE: CPT

## 2020-01-26 PROCEDURE — 63600175 PHARM REV CODE 636 W HCPCS: Performed by: NURSE PRACTITIONER

## 2020-01-26 PROCEDURE — 20600001 HC STEP DOWN PRIVATE ROOM

## 2020-01-26 PROCEDURE — 25000003 PHARM REV CODE 250: Performed by: INTERNAL MEDICINE

## 2020-01-26 RX ORDER — VALGANCICLOVIR 450 MG/1
450 TABLET, FILM COATED ORAL DAILY
Status: DISCONTINUED | OUTPATIENT
Start: 2020-01-27 | End: 2020-01-27 | Stop reason: HOSPADM

## 2020-01-26 RX ADMIN — PREDNISONE 10 MG: 10 TABLET ORAL at 08:01

## 2020-01-26 RX ADMIN — TACROLIMUS 3 MG: 1 CAPSULE ORAL at 05:01

## 2020-01-26 RX ADMIN — MYCOPHENOLATE MOFETIL 500 MG: 250 CAPSULE ORAL at 08:01

## 2020-01-26 RX ADMIN — Medication 400 MG: at 08:01

## 2020-01-26 RX ADMIN — TERBUTALINE SULFATE 5 MG: 2.5 TABLET ORAL at 02:01

## 2020-01-26 RX ADMIN — SENNOSIDES AND DOCUSATE SODIUM 2 TABLET: 8.6; 5 TABLET ORAL at 02:01

## 2020-01-26 RX ADMIN — SITAGLIPTIN 50 MG: 50 TABLET, FILM COATED ORAL at 08:01

## 2020-01-26 RX ADMIN — FERROUS SULFATE TAB EC 325 MG (65 MG FE EQUIVALENT) 325 MG: 325 (65 FE) TABLET DELAYED RESPONSE at 08:01

## 2020-01-26 RX ADMIN — PANTOPRAZOLE SODIUM 40 MG: 40 TABLET, DELAYED RELEASE ORAL at 08:01

## 2020-01-26 RX ADMIN — ASPIRIN 81 MG: 81 TABLET, DELAYED RELEASE ORAL at 08:01

## 2020-01-26 RX ADMIN — ATORVASTATIN CALCIUM 20 MG: 20 TABLET, FILM COATED ORAL at 08:01

## 2020-01-26 RX ADMIN — VENLAFAXINE HYDROCHLORIDE 150 MG: 37.5 CAPSULE, EXTENDED RELEASE ORAL at 08:01

## 2020-01-26 RX ADMIN — TERBUTALINE SULFATE 5 MG: 2.5 TABLET ORAL at 08:01

## 2020-01-26 RX ADMIN — TACROLIMUS 3 MG: 1 CAPSULE ORAL at 08:01

## 2020-01-26 RX ADMIN — OYSTER SHELL CALCIUM WITH VITAMIN D 2 TABLET: 500; 200 TABLET, FILM COATED ORAL at 08:01

## 2020-01-26 RX ADMIN — INSULIN ASPART 1 UNITS: 100 INJECTION, SOLUTION INTRAVENOUS; SUBCUTANEOUS at 08:01

## 2020-01-26 RX ADMIN — ZOLPIDEM TARTRATE 5 MG: 5 TABLET, COATED ORAL at 08:01

## 2020-01-26 RX ADMIN — GABAPENTIN 300 MG: 300 CAPSULE ORAL at 08:01

## 2020-01-26 RX ADMIN — AMLODIPINE BESYLATE 10 MG: 10 TABLET ORAL at 08:01

## 2020-01-26 NOTE — PLAN OF CARE
VSS. Call light and personal items in reach. Pt SOB when ambulating in the room. No other issues. WCTM.

## 2020-01-26 NOTE — SUBJECTIVE & OBJECTIVE
Interval History: Feels well. Walking around the unit, SOB with slight improvement.     Continuous Infusions:  Scheduled Meds:   amLODIPine  10 mg Oral Daily    aspirin  81 mg Oral Daily    atorvastatin  20 mg Oral Daily    calcium-vitamin D3  2 tablet Oral Daily    ferrous sulfate  325 mg Oral BID    gabapentin  300 mg Oral QHS    magnesium oxide  400 mg Oral Daily    mycophenolate  500 mg Oral BID    pantoprazole  40 mg Oral Daily    predniSONE  10 mg Oral Daily    SITagliptin  50 mg Oral Daily    tacrolimus  3 mg Oral BID    terbutaline  5 mg Oral TID    valGANciclovir  450 mg Oral Every other day    venlafaxine  150 mg Oral Daily     PRN Meds:Dextrose 10% Bolus, Dextrose 10% Bolus, glucagon (human recombinant), glucose, glucose, insulin aspart U-100, ondansetron, oxyCODONE, promethazine (PHENERGAN) IVPB, senna-docusate 8.6-50 mg, zolpidem    Review of patient's allergies indicates:   Allergen Reactions    Adhesive Blisters     Reaction to area in chest and up only    Codeine Itching     Objective:     Vital Signs (Most Recent):  Temp: 98.1 °F (36.7 °C) (01/26/20 0355)  Pulse: 86 (01/26/20 0704)  Resp: 12 (01/26/20 0355)  BP: 128/72 (01/26/20 0355)  SpO2: (!) 94 % (01/26/20 0355) Vital Signs (24h Range):  Temp:  [97.8 °F (36.6 °C)-98.3 °F (36.8 °C)] 98.1 °F (36.7 °C)  Pulse:  [] 86  Resp:  [12-19] 12  SpO2:  [81 %-97 %] 94 %  BP: (125-140)/(64-86) 128/72     Patient Vitals for the past 72 hrs (Last 3 readings):   Weight   01/26/20 0600 99.2 kg (218 lb 9.4 oz)   01/25/20 0600 99.9 kg (220 lb 2.1 oz)   01/24/20 0500 98.4 kg (216 lb 14.9 oz)     Body mass index is 34.24 kg/m².      Intake/Output Summary (Last 24 hours) at 1/26/2020 0900  Last data filed at 1/26/2020 0400  Gross per 24 hour   Intake 755 ml   Output 1400 ml   Net -645 ml       Physical Exam   Constitutional: She is oriented to person, place, and time. She appears well-developed and well-nourished.   HENT:   Head: Normocephalic  and atraumatic.   Eyes: Pupils are equal, round, and reactive to light. EOM are normal.   Neck: Normal range of motion. Neck supple. No JVD present.   Cardiovascular: Normal rate and regular rhythm.   Sternal incision C/D/I. Large right groin fluid-filled/cystic lesion noted at bypass access site, nontender, no erythema/drainage present.    Pulmonary/Chest: Effort normal and breath sounds normal. No stridor. No respiratory distress.   Abdominal: Soft. Bowel sounds are normal. She exhibits no distension. There is no tenderness.   Musculoskeletal: She exhibits edema (trace pitting in feet and ankles).   Neurological: She is alert and oriented to person, place, and time.   Skin: Skin is warm and dry.   Psychiatric: She has a normal mood and affect. Her behavior is normal. Judgment and thought content normal.   Nursing note and vitals reviewed.      Significant Labs:  CBC:  Recent Labs   Lab 01/24/20  0625 01/25/20  0654 01/26/20  0651   WBC 7.86 8.03 7.40   RBC 3.00* 3.06* 3.07*   HGB 7.9* 8.2* 8.2*   HCT 27.1* 27.8* 27.8*    300 350   MCV 90 91 91   MCH 26.3* 26.8* 26.7*   MCHC 29.2* 29.5* 29.5*     BNP:  Recent Labs   Lab 01/20/20  0921 01/21/20  0640 01/25/20  0654   * 486* 308*     CMP:  Recent Labs   Lab 01/24/20  0625 01/25/20  0654 01/26/20  0650   GLU 94 108 97   CALCIUM 8.9 9.5 9.2   ALBUMIN 3.3* 3.4* 3.3*   PROT 5.9* 6.0 6.0    142 141   K 4.0 4.0 3.9   CO2 22* 24 25    107 107   BUN 24* 28* 25*   CREATININE 2.5* 2.5* 1.9*   ALKPHOS 72 75 75   ALT 8* 9* 10   AST 8* 8* 9*   BILITOT 0.2 0.3 0.3      Coagulation:   No results for input(s): PT, INR, APTT in the last 168 hours.  LDH:  No results for input(s): LDH in the last 72 hours.  Microbiology:  Microbiology Results (last 7 days)     ** No results found for the last 168 hours. **          I have reviewed all pertinent labs within the past 24 hours.    Estimated Creatinine Clearance: 42.8 mL/min (A) (based on SCr of 1.9 mg/dL  (H)).    Diagnostic Results:  I have reviewed all pertinent imaging results/findings within the past 24 hours.

## 2020-01-26 NOTE — ASSESSMENT & PLAN NOTE
-Cr improved to 1.9 this AM. Recheck BMP at 2 pm.   - Encourage PO intake  -Holding lasix. d/c'd bactrim.  -Will continue to monitor.

## 2020-01-26 NOTE — PLAN OF CARE
Pt AOX3, VSS, afebrile.   One episode of desaturation overnight. Sats low 80's-placed on 1LNC.   No c/o pain/N/V.   No SSI administered at bedtime.   Telemonitor- NSR   Pt in lowest settings, call light w/in reach, nonskid socks when ambulating, remains free from falls. NAD. WCTM.

## 2020-01-26 NOTE — PROGRESS NOTES
[facility]  Heart Transplant  Progress Note    Patient Name: Deborah Navas  MRN: [id number]  Admission Date: 1/20/2020  Hospital Length of Stay: 4 days  Attending Physician: Renetta Chaudhari MD  Primary Care Provider: Valeria Verma MD  Principal Problem:Heart transplanted    Subjective:     Interval History: Feels well. Walking around the unit, SOB with slight improvement.     Continuous Infusions:  Scheduled Meds:   amLODIPine  10 mg Oral Daily    aspirin  81 mg Oral Daily    atorvastatin  20 mg Oral Daily    calcium-vitamin D3  2 tablet Oral Daily    ferrous sulfate  325 mg Oral BID    gabapentin  300 mg Oral QHS    magnesium oxide  400 mg Oral Daily    mycophenolate  500 mg Oral BID    pantoprazole  40 mg Oral Daily    predniSONE  10 mg Oral Daily    SITagliptin  50 mg Oral Daily    tacrolimus  3 mg Oral BID    terbutaline  5 mg Oral TID    valGANciclovir  450 mg Oral Every other day    venlafaxine  150 mg Oral Daily     PRN Meds:Dextrose 10% Bolus, Dextrose 10% Bolus, glucagon (human recombinant), glucose, glucose, insulin aspart U-100, ondansetron, oxyCODONE, promethazine (PHENERGAN) IVPB, senna-docusate 8.6-50 mg, zolpidem    Review of patient's allergies indicates:   Allergen Reactions    Adhesive Blisters     Reaction to area in chest and up only    Codeine Itching     Objective:     Vital Signs (Most Recent):  Temp: 98.1 °F (36.7 °C) (01/26/20 0355)  Pulse: 86 (01/26/20 0704)  Resp: 12 (01/26/20 0355)  BP: 128/72 (01/26/20 0355)  SpO2: (!) 94 % (01/26/20 0355) Vital Signs (24h Range):  Temp:  [97.8 °F (36.6 °C)-98.3 °F (36.8 °C)] 98.1 °F (36.7 °C)  Pulse:  [] 86  Resp:  [12-19] 12  SpO2:  [81 %-97 %] 94 %  BP: (125-140)/(64-86) 128/72     Patient Vitals for the past 72 hrs (Last 3 readings):   Weight   01/26/20 0600 99.2 kg (218 lb 9.4 oz)   01/25/20 0600 99.9 kg (220 lb 2.1 oz)   01/24/20 0500 98.4 kg (216 lb 14.9 oz)     Body mass index is 34.24  kg/m².      Intake/Output Summary (Last 24 hours) at 1/26/2020 0900  Last data filed at 1/26/2020 0400  Gross per 24 hour   Intake 755 ml   Output 1400 ml   Net -645 ml       Physical Exam   Constitutional: She is oriented to person, place, and time. She appears well-developed and well-nourished.   HENT:   Head: Normocephalic and atraumatic.   Eyes: Pupils are equal, round, and reactive to light. EOM are normal.   Neck: Normal range of motion. Neck supple. No JVD present.   Cardiovascular: Normal rate and regular rhythm.   Sternal incision C/D/I. Large right groin fluid-filled/cystic lesion noted at bypass access site, nontender, no erythema/drainage present.    Pulmonary/Chest: Effort normal and breath sounds normal. No stridor. No respiratory distress.   Abdominal: Soft. Bowel sounds are normal. She exhibits no distension. There is no tenderness.   Musculoskeletal: She exhibits edema (trace pitting in feet and ankles).   Neurological: She is alert and oriented to person, place, and time.   Skin: Skin is warm and dry.   Psychiatric: She has a normal mood and affect. Her behavior is normal. Judgment and thought content normal.   Nursing note and vitals reviewed.      Significant Labs:  CBC:  Recent Labs   Lab 01/24/20  0625 01/25/20  0654 01/26/20  0651   WBC 7.86 8.03 7.40   RBC 3.00* 3.06* 3.07*   HGB 7.9* 8.2* 8.2*   HCT 27.1* 27.8* 27.8*    300 350   MCV 90 91 91   MCH 26.3* 26.8* 26.7*   MCHC 29.2* 29.5* 29.5*     BNP:  Recent Labs   Lab 01/20/20  0921 01/21/20  0640 01/25/20  0654   * 486* 308*     CMP:  Recent Labs   Lab 01/24/20  0625 01/25/20  0654 01/26/20  0650   GLU 94 108 97   CALCIUM 8.9 9.5 9.2   ALBUMIN 3.3* 3.4* 3.3*   PROT 5.9* 6.0 6.0    142 141   K 4.0 4.0 3.9   CO2 22* 24 25    107 107   BUN 24* 28* 25*   CREATININE 2.5* 2.5* 1.9*   ALKPHOS 72 75 75   ALT 8* 9* 10   AST 8* 8* 9*   BILITOT 0.2 0.3 0.3      Coagulation:   No results for input(s): PT, INR, APTT in the last  168 hours.  LDH:  No results for input(s): LDH in the last 72 hours.  Microbiology:  Microbiology Results (last 7 days)     ** No results found for the last 168 hours. **          I have reviewed all pertinent labs within the past 24 hours.    Estimated Creatinine Clearance: 42.8 mL/min (A) (based on SCr of 1.9 mg/dL (H)).    Diagnostic Results:  I have reviewed all pertinent imaging results/findings within the past 24 hours.    Assessment and Plan:     50yo F s/p OHTx on 12/16/2019 for NICM/ HFrEF with HM3 on 9/2019, removed at time of transplant, HLD, obesity, and OA, who presented for worsening nausea and continued dyspnea on exertion. The pathology of native heart revealed non-caseating granulomas typical of sarcoidosis. Was recently admitted with similar symptoms/GLO in which she was diuresed and BP medications adjusted with improvement in renal function.  RHC on 1/7 with RA 12, wedge 15 and high CO/CI. TTE revealed LVEF 65% and normal RV function, mild MR, mod-sev TR, IVC not seen; no pericardial effusion appreciated. She also had significant tremors on admission and theophylline levels were found to be severely elevated so it was discontinued, with some improvement. EMB from 12/31 and 1/7 were both negative for Ab or cell-mediated rejection. She states that she did OK when initially discharged but has been getting progressively more nauseous over the last few days. Denies any sick contacts. No diarrhea. Vomiting occasionally but mostly dry heaves. She also still complains of dyspnea with minimal exertion which has not imrpoved since surgery.     * Heart transplanted  -Transplant Date 12/16/2019. HM 3 implanted 9/10/19 removed during transplant.    -Transplanted by: Dr. Nuñez.  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  -Prednisone 10 mg daily,  BID (decreased); Prograf 3/3.   -Biopsy neg 1/7 and 1/14.  -Prophylaxis-  Valcyte. Stopped Bactrim. Given dose of pentamadine on 1/23.    -Relative bradycardia: did not tolerate theophylline previously (elevated levels and tremors). On terbutaline.    Postural dizziness with presyncope  -Stopped hydralazine.  -Continue amlodipine for now.     Nausea  -Resolved.   -Anti emetics prn.  -CMV (-).  -MMF dose decreased on admission, bactrim held.      CKD (chronic kidney disease)  -Cr improved to 1.9 this AM. Recheck BMP at 2 pm.   - Encourage PO intake  -Holding lasix. d/c'd bactrim.  -Will continue to monitor.      Right groin fluid collection  - US yesterday showed larger collection smaller and smaller collection slightly larger.   - Not painful. Continue to monitor.     Alphonse Valdes MD  Heart Transplant  [facility]

## 2020-01-27 VITALS
DIASTOLIC BLOOD PRESSURE: 75 MMHG | HEIGHT: 67 IN | OXYGEN SATURATION: 97 % | RESPIRATION RATE: 21 BRPM | BODY MASS INDEX: 34.43 KG/M2 | SYSTOLIC BLOOD PRESSURE: 125 MMHG | HEART RATE: 100 BPM | WEIGHT: 219.38 LBS | TEMPERATURE: 98 F

## 2020-01-27 LAB
ALBUMIN SERPL BCP-MCNC: 3.3 G/DL (ref 3.5–5.2)
ALP SERPL-CCNC: 73 U/L (ref 55–135)
ALT SERPL W/O P-5'-P-CCNC: 9 U/L (ref 10–44)
ANION GAP SERPL CALC-SCNC: 11 MMOL/L (ref 8–16)
AST SERPL-CCNC: 9 U/L (ref 10–40)
BASOPHILS # BLD AUTO: 0.06 K/UL (ref 0–0.2)
BASOPHILS NFR BLD: 0.8 % (ref 0–1.9)
BILIRUB SERPL-MCNC: 0.3 MG/DL (ref 0.1–1)
BUN SERPL-MCNC: 25 MG/DL (ref 6–20)
CALCIUM SERPL-MCNC: 9 MG/DL (ref 8.7–10.5)
CHLORIDE SERPL-SCNC: 107 MMOL/L (ref 95–110)
CO2 SERPL-SCNC: 22 MMOL/L (ref 23–29)
CREAT SERPL-MCNC: 1.9 MG/DL (ref 0.5–1.4)
DIFFERENTIAL METHOD: ABNORMAL
EOSINOPHIL # BLD AUTO: 0 K/UL (ref 0–0.5)
EOSINOPHIL NFR BLD: 0.6 % (ref 0–8)
ERYTHROCYTE [DISTWIDTH] IN BLOOD BY AUTOMATED COUNT: 17.9 % (ref 11.5–14.5)
EST. GFR  (AFRICAN AMERICAN): 34.9 ML/MIN/1.73 M^2
EST. GFR  (NON AFRICAN AMERICAN): 30.3 ML/MIN/1.73 M^2
G6PD RBC-CCNT: 19.8 U/G HGB (ref 7–20.5)
GLUCOSE SERPL-MCNC: 98 MG/DL (ref 70–110)
HBV SURFACE AG SERPL QL IA: NEGATIVE
HCT VFR BLD AUTO: 27.8 % (ref 37–48.5)
HCV RNA SERPL NAA+PROBE-LOG IU: <1.08 LOG (10) IU/ML
HCV RNA SERPL QL NAA+PROBE: NOT DETECTED IU/ML
HCV RNA SPEC NAA+PROBE-ACNC: <12 IU/ML
HGB BLD-MCNC: 8 G/DL (ref 12–16)
HIV 1+2 AB+HIV1 P24 AG SERPL QL IA: NEGATIVE
IMM GRANULOCYTES # BLD AUTO: 0.12 K/UL (ref 0–0.04)
IMM GRANULOCYTES NFR BLD AUTO: 1.7 % (ref 0–0.5)
LYMPHOCYTES # BLD AUTO: 0.4 K/UL (ref 1–4.8)
LYMPHOCYTES NFR BLD: 4.9 % (ref 18–48)
MAGNESIUM SERPL-MCNC: 1.6 MG/DL (ref 1.6–2.6)
MCH RBC QN AUTO: 26.6 PG (ref 27–31)
MCHC RBC AUTO-ENTMCNC: 28.8 G/DL (ref 32–36)
MCV RBC AUTO: 92 FL (ref 82–98)
MONOCYTES # BLD AUTO: 0.7 K/UL (ref 0.3–1)
MONOCYTES NFR BLD: 9.1 % (ref 4–15)
NEUTROPHILS # BLD AUTO: 5.9 K/UL (ref 1.8–7.7)
NEUTROPHILS NFR BLD: 82.9 % (ref 38–73)
NRBC BLD-RTO: 0 /100 WBC
PLATELET # BLD AUTO: 296 K/UL (ref 150–350)
PMV BLD AUTO: 9.4 FL (ref 9.2–12.9)
POCT GLUCOSE: 104 MG/DL (ref 70–110)
POCT GLUCOSE: 159 MG/DL (ref 70–110)
POTASSIUM SERPL-SCNC: 3.9 MMOL/L (ref 3.5–5.1)
PROT SERPL-MCNC: 6 G/DL (ref 6–8.4)
RBC # BLD AUTO: 3.01 M/UL (ref 4–5.4)
SODIUM SERPL-SCNC: 140 MMOL/L (ref 136–145)
TACROLIMUS BLD-MCNC: 8.6 NG/ML (ref 5–15)
WBC # BLD AUTO: 7.12 K/UL (ref 3.9–12.7)

## 2020-01-27 PROCEDURE — 63600175 PHARM REV CODE 636 W HCPCS: Performed by: NURSE PRACTITIONER

## 2020-01-27 PROCEDURE — 80197 ASSAY OF TACROLIMUS: CPT

## 2020-01-27 PROCEDURE — 99239 HOSP IP/OBS DSCHRG MGMT >30: CPT | Mod: ,,, | Performed by: INTERNAL MEDICINE

## 2020-01-27 PROCEDURE — 99239 PR HOSPITAL DISCHARGE DAY,>30 MIN: ICD-10-PCS | Mod: ,,, | Performed by: INTERNAL MEDICINE

## 2020-01-27 PROCEDURE — 85025 COMPLETE CBC W/AUTO DIFF WBC: CPT

## 2020-01-27 PROCEDURE — 25000003 PHARM REV CODE 250: Performed by: NURSE PRACTITIONER

## 2020-01-27 PROCEDURE — 83735 ASSAY OF MAGNESIUM: CPT

## 2020-01-27 PROCEDURE — 25000003 PHARM REV CODE 250: Performed by: INTERNAL MEDICINE

## 2020-01-27 PROCEDURE — 36415 COLL VENOUS BLD VENIPUNCTURE: CPT

## 2020-01-27 PROCEDURE — 80053 COMPREHEN METABOLIC PANEL: CPT

## 2020-01-27 RX ORDER — AMLODIPINE BESYLATE 5 MG/1
5 TABLET ORAL DAILY
Qty: 30 TABLET | Refills: 11 | Status: SHIPPED | OUTPATIENT
Start: 2020-01-28 | End: 2021-01-19

## 2020-01-27 RX ORDER — VALGANCICLOVIR 450 MG/1
450 TABLET, FILM COATED ORAL DAILY
Qty: 30 TABLET | Refills: 11
Start: 2020-01-27 | End: 2020-04-15

## 2020-01-27 RX ORDER — TERBUTALINE SULFATE 5 MG/1
5 TABLET ORAL 3 TIMES DAILY
Qty: 90 TABLET | Refills: 3 | Status: SHIPPED | OUTPATIENT
Start: 2020-01-27 | End: 2020-02-24

## 2020-01-27 RX ORDER — MYCOPHENOLATE MOFETIL 250 MG/1
500 CAPSULE ORAL 2 TIMES DAILY
Qty: 120 CAPSULE | Refills: 11 | Status: SHIPPED | OUTPATIENT
Start: 2020-01-27 | End: 2020-09-11

## 2020-01-27 RX ORDER — AMLODIPINE BESYLATE 5 MG/1
5 TABLET ORAL DAILY
Status: DISCONTINUED | OUTPATIENT
Start: 2020-01-28 | End: 2020-01-27 | Stop reason: HOSPADM

## 2020-01-27 RX ORDER — DAPSONE 100 MG/1
100 TABLET ORAL DAILY
Qty: 30 TABLET | Refills: 11 | Status: SHIPPED | OUTPATIENT
Start: 2020-01-27 | End: 2020-03-31 | Stop reason: SDUPTHER

## 2020-01-27 RX ADMIN — AMLODIPINE BESYLATE 10 MG: 10 TABLET ORAL at 09:01

## 2020-01-27 RX ADMIN — VENLAFAXINE HYDROCHLORIDE 150 MG: 37.5 CAPSULE, EXTENDED RELEASE ORAL at 09:01

## 2020-01-27 RX ADMIN — ASPIRIN 81 MG: 81 TABLET, DELAYED RELEASE ORAL at 09:01

## 2020-01-27 RX ADMIN — PREDNISONE 10 MG: 10 TABLET ORAL at 09:01

## 2020-01-27 RX ADMIN — VALGANCICLOVIR 450 MG: 450 TABLET, FILM COATED ORAL at 09:01

## 2020-01-27 RX ADMIN — TACROLIMUS 3 MG: 1 CAPSULE ORAL at 09:01

## 2020-01-27 RX ADMIN — OYSTER SHELL CALCIUM WITH VITAMIN D 2 TABLET: 500; 200 TABLET, FILM COATED ORAL at 09:01

## 2020-01-27 RX ADMIN — TERBUTALINE SULFATE 5 MG: 2.5 TABLET ORAL at 09:01

## 2020-01-27 RX ADMIN — TERBUTALINE SULFATE 5 MG: 2.5 TABLET ORAL at 12:01

## 2020-01-27 RX ADMIN — SITAGLIPTIN 50 MG: 50 TABLET, FILM COATED ORAL at 09:01

## 2020-01-27 RX ADMIN — MYCOPHENOLATE MOFETIL 500 MG: 250 CAPSULE ORAL at 09:01

## 2020-01-27 RX ADMIN — PANTOPRAZOLE SODIUM 40 MG: 40 TABLET, DELAYED RELEASE ORAL at 09:01

## 2020-01-27 RX ADMIN — Medication 400 MG: at 09:01

## 2020-01-27 RX ADMIN — FERROUS SULFATE TAB EC 325 MG (65 MG FE EQUIVALENT) 325 MG: 325 (65 FE) TABLET DELAYED RESPONSE at 09:01

## 2020-01-27 RX ADMIN — ATORVASTATIN CALCIUM 20 MG: 20 TABLET, FILM COATED ORAL at 09:01

## 2020-01-27 NOTE — SUBJECTIVE & OBJECTIVE
"Interval History: Feels well. Walking around the unit, SOB improved. Only concern is "puffy feet".     Scheduled Meds:   amLODIPine  10 mg Oral Daily    aspirin  81 mg Oral Daily    atorvastatin  20 mg Oral Daily    calcium-vitamin D3  2 tablet Oral Daily    ferrous sulfate  325 mg Oral BID    gabapentin  300 mg Oral QHS    magnesium oxide  400 mg Oral Daily    mycophenolate  500 mg Oral BID    pantoprazole  40 mg Oral Daily    predniSONE  10 mg Oral Daily    SITagliptin  50 mg Oral Daily    tacrolimus  3 mg Oral BID    terbutaline  5 mg Oral TID    valGANciclovir  450 mg Oral Daily    venlafaxine  150 mg Oral Daily     PRN Meds:Dextrose 10% Bolus, Dextrose 10% Bolus, glucagon (human recombinant), glucose, glucose, insulin aspart U-100, ondansetron, oxyCODONE, promethazine (PHENERGAN) IVPB, senna-docusate 8.6-50 mg, zolpidem    Review of patient's allergies indicates:   Allergen Reactions    Adhesive Blisters     Reaction to area in chest and up only    Codeine Itching     Objective:     Vital Signs (Most Recent):  Temp: 98.2 °F (36.8 °C) (01/26/20 2040)  Pulse: 85 (01/27/20 0420)  Resp: 20 (01/27/20 0420)  BP: 134/80 (01/27/20 0420)  SpO2: 95 % (01/27/20 0420) Vital Signs (24h Range):  Temp:  [98.1 °F (36.7 °C)-98.2 °F (36.8 °C)] 98.2 °F (36.8 °C)  Pulse:  [85-88] 85  Resp:  [15-20] 20  SpO2:  [95 %-97 %] 95 %  BP: (125-134)/(75-80) 134/80     Patient Vitals for the past 72 hrs (Last 3 readings):   Weight   01/27/20 0500 99.5 kg (219 lb 5.7 oz)   01/26/20 0600 99.2 kg (218 lb 9.4 oz)   01/25/20 0600 99.9 kg (220 lb 2.1 oz)     Body mass index is 34.36 kg/m².      Intake/Output Summary (Last 24 hours) at 1/27/2020 0926  Last data filed at 1/27/2020 0500  Gross per 24 hour   Intake 700 ml   Output 1450 ml   Net -750 ml       Physical Exam   Constitutional: She is oriented to person, place, and time. She appears well-developed and well-nourished.   HENT:   Head: Normocephalic and atraumatic.   Eyes: " Pupils are equal, round, and reactive to light. EOM are normal.   Neck: Normal range of motion. Neck supple. No JVD present.   Cardiovascular: Normal rate and regular rhythm.   Sternal incision C/D/I. Large right groin fluid-filled/cystic lesion noted at bypass access site, nontender, no erythema/drainage present.    Pulmonary/Chest: Effort normal and breath sounds normal. No stridor. No respiratory distress.   Abdominal: Soft. Bowel sounds are normal. She exhibits no distension. There is no tenderness.   Musculoskeletal: She exhibits edema.   Neurological: She is alert and oriented to person, place, and time.   Skin: Skin is warm and dry.   Psychiatric: She has a normal mood and affect. Her behavior is normal. Judgment and thought content normal.   Nursing note and vitals reviewed.    Significant Labs:  CBC:  Recent Labs   Lab 01/25/20  0654 01/26/20  0651 01/27/20  0620   WBC 8.03 7.40 7.12   RBC 3.06* 3.07* 3.01*   HGB 8.2* 8.2* 8.0*   HCT 27.8* 27.8* 27.8*    350 296   MCV 91 91 92   MCH 26.8* 26.7* 26.6*   MCHC 29.5* 29.5* 28.8*     BNP:  Recent Labs   Lab 01/21/20  0640 01/25/20  0654   * 308*     CMP:  Recent Labs   Lab 01/25/20  0654 01/26/20  0650 01/26/20  1358 01/27/20  0621    97 193* 98   CALCIUM 9.5 9.2 9.3 9.0   ALBUMIN 3.4* 3.3*  --  3.3*   PROT 6.0 6.0  --  6.0    141 143 140   K 4.0 3.9 4.2 3.9   CO2 24 25 24 22*    107 107 107   BUN 28* 25* 23* 25*   CREATININE 2.5* 1.9* 1.8* 1.9*   ALKPHOS 75 75  --  73   ALT 9* 10  --  9*   AST 8* 9*  --  9*   BILITOT 0.3 0.3  --  0.3      Coagulation:   No results for input(s): PT, INR, APTT in the last 168 hours.  LDH:  No results for input(s): LDH in the last 72 hours.  Microbiology:  Microbiology Results (last 7 days)     ** No results found for the last 168 hours. **        I have reviewed all pertinent labs within the past 24 hours.    Estimated Creatinine Clearance: 42.9 mL/min (A) (based on SCr of 1.9 mg/dL  (H)).    Diagnostic Results:  I have reviewed all pertinent imaging results/findings within the past 24 hours.

## 2020-01-27 NOTE — PLAN OF CARE
-Pt AAOx4  -Vital signs stable  -Telemetry monitoring NSR   -BG monitoring ACHS  -Terbutaline continued per order  -Cr down to 1.8 on AM labs; encourage PO intake  -Fall precautions maintained, non skid socks worn  -No acute events/falls/injuries this shift  -Pt aware to call for help with ambulating.    -Bed lowered, locked, siderails up x2, and call bell in reach.    See flowsheet for assessment findings.  Will continue to monitor pt.

## 2020-01-27 NOTE — PROGRESS NOTES
Discharge Note:    BENIGNO met with pt and caregiver Flavia in pt's room a long with Heart Transplant Coordinator, Breann Pastrana, RN. Pt was AAOx4, pleasant, and appeared to be just waking up. Pt and caregiver are looking forward to pt leaving the hospital today. Pt's caregiver to transport pt back to the [facility] [id number].     Pt and caregiver on board with pt attending outpt PT/OT in order to help with getting into a routine and getting out of the apartment. Pt reports that she has already been contacted by [facility] for scheduling PT/OT. Pt aware that she will no longer be able to receive Home Health if she is utilizing outpt PT/OT. Pt expressed understanding and is okay with this. BENIGNO confirmed with Bartow at [facility] ([id number]) that pt scheduled for an evaluation tomorrow.     Pt requesting a rollator and will need nocturnal O2 due to oxygen desaturating while asleep. SW to follow up on setting this up. BENIGNO spoke with Ruth at [facility] [id number], who states that orders received for both O2 and rollator. [facility] to deliver Oxygen Concentrator and rollator this afternoon to pt's apartment. Ruth stating that pt will need to get a sleep study within 30 days in order to continue to utilize the home concentrator, otherwise, Ruth states that [facility] will have to take back the concentrator. SW notified pt of this information.     Pt reports that she plans on enrolling with a therapist at [facility], but would like to get her PT/OT schedule set up first before doing this. SW expressed understanding.     Pt and caregiver hoping pt continues to make progress as pt has had multiple stints in the hospital post-transplant. Pt and caregiver report goal of sleeping less and getting out more.     SW provided space for pt to ask questions/voice concerns. Pt and caregiver denying further needs at this time. SW remains available for continued psychosocial support, education, resources, and additional d/c planning as  needed.

## 2020-01-27 NOTE — ASSESSMENT & PLAN NOTE
-Cr improved to 1.9 this AM.  - Encourage PO intake  -Holding lasix. d/c'd bactrim.  -Will continue to monitor.

## 2020-01-27 NOTE — NURSING
[facility].  Prior to discharge, reviewed medication changes and when to follow up.  Pt stated understanding.  Pt left via wheelchair, accompanied by caregiver, with all personal belongings and discharge handouts.

## 2020-01-27 NOTE — NURSING
Heart Transplant Coordinator and  Visit:  Met with patient and caregiver, Flavia, in room. Woke pt who sat on edge of bed. Discussed discharge plans:  1. Outpatient PT/ OT - they will stay in [place] at the [facility] until she becomes stronger and develops better balance; they can switch to [facility] at a future time  2. Daily plan to get out of bed, dress, eat breakfast and stay busy with PT and will increase activities (eg. Visiting museums, [place] )  3. Will decide about [facility] depending on Cr in the near future  4. Asked about a walker/ rollator for use only when she tires. Will check into this request  5. Oxygen for home particularly at night due to desaturation of 02 in the 70's. Discussed with Marissa Davis RN, , the need to get sleep study scheduled within 30 days of today or her oxygen will be removed

## 2020-01-27 NOTE — PROGRESS NOTES
"[facility]  Heart Transplant  Progress Note    Patient Name: Deborah Navas  MRN: [id number]  Admission Date: 1/20/2020  Hospital Length of Stay: 5 days  Attending Physician: Renetta Chaudhari MD  Primary Care Provider: Valeria Verma MD  Principal Problem:Heart transplanted    Subjective:     Interval History: Feels well. Walking around the unit, SOB improved. Only concern is "puffy feet".     Scheduled Meds:   amLODIPine  10 mg Oral Daily    aspirin  81 mg Oral Daily    atorvastatin  20 mg Oral Daily    calcium-vitamin D3  2 tablet Oral Daily    ferrous sulfate  325 mg Oral BID    gabapentin  300 mg Oral QHS    magnesium oxide  400 mg Oral Daily    mycophenolate  500 mg Oral BID    pantoprazole  40 mg Oral Daily    predniSONE  10 mg Oral Daily    SITagliptin  50 mg Oral Daily    tacrolimus  3 mg Oral BID    terbutaline  5 mg Oral TID    valGANciclovir  450 mg Oral Daily    venlafaxine  150 mg Oral Daily     PRN Meds:Dextrose 10% Bolus, Dextrose 10% Bolus, glucagon (human recombinant), glucose, glucose, insulin aspart U-100, ondansetron, oxyCODONE, promethazine (PHENERGAN) IVPB, senna-docusate 8.6-50 mg, zolpidem    Review of patient's allergies indicates:   Allergen Reactions    Adhesive Blisters     Reaction to area in chest and up only    Codeine Itching     Objective:     Vital Signs (Most Recent):  Temp: 98.2 °F (36.8 °C) (01/26/20 2040)  Pulse: 85 (01/27/20 0420)  Resp: 20 (01/27/20 0420)  BP: 134/80 (01/27/20 0420)  SpO2: 95 % (01/27/20 0420) Vital Signs (24h Range):  Temp:  [98.1 °F (36.7 °C)-98.2 °F (36.8 °C)] 98.2 °F (36.8 °C)  Pulse:  [85-88] 85  Resp:  [15-20] 20  SpO2:  [95 %-97 %] 95 %  BP: (125-134)/(75-80) 134/80     Patient Vitals for the past 72 hrs (Last 3 readings):   Weight   01/27/20 0500 99.5 kg (219 lb 5.7 oz)   01/26/20 0600 99.2 kg (218 lb 9.4 oz)   01/25/20 0600 99.9 kg (220 lb 2.1 oz)     Body mass index is 34.36 kg/m².      Intake/Output " Summary (Last 24 hours) at 1/27/2020 0926  Last data filed at 1/27/2020 0500  Gross per 24 hour   Intake 700 ml   Output 1450 ml   Net -750 ml       Physical Exam   Constitutional: She is oriented to person, place, and time. She appears well-developed and well-nourished.   HENT:   Head: Normocephalic and atraumatic.   Eyes: Pupils are equal, round, and reactive to light. EOM are normal.   Neck: Normal range of motion. Neck supple. No JVD present.   Cardiovascular: Normal rate and regular rhythm.   Sternal incision C/D/I. Large right groin fluid-filled/cystic lesion noted at bypass access site, nontender, no erythema/drainage present.    Pulmonary/Chest: Effort normal and breath sounds normal. No stridor. No respiratory distress.   Abdominal: Soft. Bowel sounds are normal. She exhibits no distension. There is no tenderness.   Musculoskeletal: She exhibits edema.   Neurological: She is alert and oriented to person, place, and time.   Skin: Skin is warm and dry.   Psychiatric: She has a normal mood and affect. Her behavior is normal. Judgment and thought content normal.   Nursing note and vitals reviewed.    Significant Labs:  CBC:  Recent Labs   Lab 01/25/20  0654 01/26/20  0651 01/27/20  0620   WBC 8.03 7.40 7.12   RBC 3.06* 3.07* 3.01*   HGB 8.2* 8.2* 8.0*   HCT 27.8* 27.8* 27.8*    350 296   MCV 91 91 92   MCH 26.8* 26.7* 26.6*   MCHC 29.5* 29.5* 28.8*     BNP:  Recent Labs   Lab 01/21/20  0640 01/25/20  0654   * 308*     CMP:  Recent Labs   Lab 01/25/20  0654 01/26/20  0650 01/26/20  1358 01/27/20  0621    97 193* 98   CALCIUM 9.5 9.2 9.3 9.0   ALBUMIN 3.4* 3.3*  --  3.3*   PROT 6.0 6.0  --  6.0    141 143 140   K 4.0 3.9 4.2 3.9   CO2 24 25 24 22*    107 107 107   BUN 28* 25* 23* 25*   CREATININE 2.5* 1.9* 1.8* 1.9*   ALKPHOS 75 75  --  73   ALT 9* 10  --  9*   AST 8* 9*  --  9*   BILITOT 0.3 0.3  --  0.3      Coagulation:   No results for input(s): PT, INR, APTT in the last 168  hours.  LDH:  No results for input(s): LDH in the last 72 hours.  Microbiology:  Microbiology Results (last 7 days)     ** No results found for the last 168 hours. **        I have reviewed all pertinent labs within the past 24 hours.    Estimated Creatinine Clearance: 42.9 mL/min (A) (based on SCr of 1.9 mg/dL (H)).    Diagnostic Results:  I have reviewed all pertinent imaging results/findings within the past 24 hours.    Assessment and Plan:     50yo F s/p OHTx on 12/16/2019 for NICM/ HFrEF with HM3 on 9/2019, removed at time of transplant, HLD, obesity, and OA, who presented for worsening nausea and continued dyspnea on exertion. The pathology of native heart revealed non-caseating granulomas typical of sarcoidosis. Was recently admitted with similar symptoms/GLO in which she was diuresed and BP medications adjusted with improvement in renal function.  RHC on 1/7 with RA 12, wedge 15 and high CO/CI. TTE revealed LVEF 65% and normal RV function, mild MR, mod-sev TR, IVC not seen; no pericardial effusion appreciated. She also had significant tremors on admission and theophylline levels were found to be severely elevated so it was discontinued, with some improvement. EMB from 12/31 and 1/7 were both negative for Ab or cell-mediated rejection. She states that she did OK when initially discharged but has been getting progressively more nauseous over the last few days. Denies any sick contacts. No diarrhea. Vomiting occasionally but mostly dry heaves. She also still complains of dyspnea with minimal exertion which has not imrpoved since surgery.     * Heart transplanted  -Transplant Date 12/16/2019. HM 3 implanted 9/10/19 removed during transplant.    -Transplanted by: Dr. Nuñez.  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  -Prednisone 10 mg daily,  BID (decreased); Prograf 3/3.   -Biopsy neg 1/7 and 1/14.  -Prophylaxis-  Valcyte. Stopped Bactrim. Given dose of pentamadine on 1/23.    -Relative bradycardia: did not tolerate theophylline previously (elevated levels and tremors). On terbutaline.    Nausea  -Resolved.   -Anti emetics prn.  -CMV (-).  -MMF dose decreased on admission, bactrim held.      Postural dizziness with presyncope  -Stopped hydralazine.  -Continue amlodipine for now.     CKD (chronic kidney disease)  -Cr improved to 1.9 this AM.  - Encourage PO intake  -Holding lasix. d/c'd bactrim.  -Will continue to monitor.        Americo Albert NP  Heart Transplant  [facility]

## 2020-01-27 NOTE — DISCHARGE SUMMARY
[facility]  Heart Transplant  Discharge Summary      Patient Name: Deborah Navas  MRN: [id number]  Admission Date: 1/20/2020  Hospital Length of Stay: 5 days  Discharge Date and Time: 01/27/2020 4:12 PM  Attending Physician: No att. providers found   Discharging Provider: Americo Albert NP  Primary Care Provider: Valeria Verma MD     HPI: 48yo F s/p OHTx on 12/16/2019 for NICM/ HFrEF with HM3 on 9/2019, removed at time of transplant, HLD, obesity, and OA, who presented for worsening nausea and continued dyspnea on exertion. The pathology of native heart revealed non-caseating granulomas typical of sarcoidosis. Was recently admitted with similar symptoms/GLO in which she was diuresed and BP medications adjusted with improvement in renal function.  RHC on 1/7 with RA 12, wedge 15 and high CO/CI. TTE revealed LVEF 65% and normal RV function, mild MR, mod-sev TR, IVC not seen; no pericardial effusion appreciated. She also had significant tremors on admission and theophylline levels were found to be severely elevated so it was discontinued, with some improvement. EMB from 12/31 and 1/7 were both negative for Ab or cell-mediated rejection. She states that she did OK when initially discharged but has been getting progressively more nauseous over the last few days. Denies any sick contacts. No diarrhea. Vomiting occasionally but mostly dry heaves. She also still complains of dyspnea with minimal exertion which has not imrpoved since surgery.     * No surgery found *     Hospital Course: Pt was admitted and gently diuresed with IVP Lasix. In addition, MMF dose was decreased and Bactrim was stopped. Her nausea improved rapidly and she continued to work with PT/OT. She was transitioned to terbutaline from theophylline which she tolerated well. Her dyspnea improved as well throughout stay. She was given dose of pentamidine with plans to start dapsone as an outpt. She was discharged home in good  "condition with plans to f/u with biopsy tomorrow.     Consults (From admission, onward)        Status Ordering Provider     Inpatient consult to Midline team  Once     Provider:  (Not yet assigned)    Completed WALDEMAR ANDRADE     Inpatient consult to PICC team ([facility])  Once     Provider:  (Not yet assigned)    Completed WALDEMAR ANDRADE        Pending Diagnostic Studies:     Procedure Component Value Units Date/Time    Hepatitis B Viral DNA, Quantitative [[id number]] Collected:  01/25/20 0653    Order Status:  Sent Lab Status:  In process Updated:  01/25/20 0709    Specimen:  Blood         Final Active Diagnoses:    Diagnosis Date Noted POA    PRINCIPAL PROBLEM:  Heart transplanted [Z94.1] 12/16/2019 Not Applicable    Nausea [R11.0] 12/09/2019 Yes    Postural dizziness with presyncope [R42, R55] 01/20/2020 Yes    Vomiting [R11.10]  Yes    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 [D86.85] 01/02/2020 Yes    CKD (chronic kidney disease) [N18.9] 09/07/2019 Yes      Problems Resolved During this Admission:      Discharged Condition: good    Disposition: Home or Self Care    Patient Instructions:      OXYGEN FOR HOME USE     Order Specific Question Answer Comments   Liter Flow 2    Duration With sleep    Qualifying SpO2: 80%    Testing done at: Sleep    Route nasal cannula    Device home concentrator    Height: 5' 7" (1.702 m)    Weight: 99.5 kg (219 lb 5.7 oz)    Does patient have medical equipment at home? none    Alternative treatment measures have been tried or considered and deemed clinically ineffective. Yes      WALKER FOR HOME USE   Order Comments: rollator     Order Specific Question Answer Comments   Type of Walker: Adult (5'4"-6'6")    With wheels? Yes    Height: 5' 7" (1.702 m)    Weight: 99.5 kg (219 lb 5.7 oz)    Length of need (1-99 months): 99    Does patient have medical equipment at home? none    Please check all that apply: Patient's condition impairs ambulation.  "     Ambulatory Referral to Physical/Occupational Therapy   Referral Priority: Routine Referral Type: Physical Medicine   Referral Reason: Specialty Services Required   Number of Visits Requested: 1     Ambulatory referral to Occupational Therapy   Referral Priority: Routine Referral Type: Physical Medicine   Referral Reason: Specialty Services Required   Requested Specialty: Occupational Therapy   Number of Visits Requested: 1     Medications:  Reconciled Home Medications:      Medication List      START taking these medications    dapsone 100 MG Tab  Take 1 tablet (100 mg total) by mouth once daily.     terbutaline 5 mg Tab  Commonly known as:  BRETHINE  Take 1 tablet (5 mg total) by mouth 3 (three) times daily.        CHANGE how you take these medications    amLODIPine 5 MG tablet  Commonly known as:  NORVASC  Take 1 tablet (5 mg total) by mouth once daily.  Start taking on:  January 28, 2020  What changed:    · medication strength  · how much to take     mycophenolate 250 mg Cap  Commonly known as:  CELLCEPT  Take 2 capsules (500 mg total) by mouth 2 (two) times daily.  What changed:  how much to take     valGANciclovir 450 mg Tab  Commonly known as:  VALCYTE  Take 1 tablet (450 mg total) by mouth once daily.  What changed:  when to take this        CONTINUE taking these medications    Accu-Chek Fastclix Lancet [facility] [place]  Generic drug:  lancets  Use to test blood glucose 2 (two) times daily.     Accu-Chek Guide Me Glucose Mtr Misc  Generic drug:  blood-glucose meter  USE AS DIRECTED     Accu-Chek Guide Strp  Generic drug:  blood sugar diagnostic  Use to test blood glucose 2 (two) times daily.     aspirin 81 MG EC tablet  Commonly known as:  ECOTRIN  Take 1 tablet (81 mg total) by mouth once daily.     atorvastatin 20 MG tablet  Commonly known as:  LIPITOR  Take 1 tablet (20 mg total) by mouth once daily.     calcium carbonate-vitamin D3 1,000 mg(2,500 mg)-800 unit Tab  Take 1 tablet by mouth once daily.      ferrous sulfate 325 mg (65 mg iron) Tab tablet  Commonly known as:  FEOSOL  Take 1 tablet (325 mg total) by mouth 2 (two) times daily.     gabapentin 300 MG capsule  Commonly known as:  NEURONTIN  Take 1 capsule (300 mg total) by mouth every evening.     Januvia 50 MG Tab  Generic drug:  SITagliptin  Take 1 tablet (50 mg total) by mouth once daily.     magnesium oxide 400 mg (241.3 mg magnesium) tablet  Commonly known as:  MAG-OX  Take 1 tablet (400 mg total) by mouth once daily.     opw transplant care kit 1 Kit  Welcome to [facility].  This is your transplant care kit.     oxyCODONE 10 mg Tab immediate release tablet  Commonly known as:  ROXICODONE  Take 1 tablet (10 mg total) by mouth every 8 (eight) hours as needed.     pantoprazole 40 MG tablet  Commonly known as:  PROTONIX  Take 1 tablet (40 mg total) by mouth once daily.     predniSONE 5 MG tablet  Commonly known as:  DELTASONE  Take 2 tablets (10 mg total) by mouth once daily.     senna-docusate 8.6-50 mg 8.6-50 mg per tablet  Commonly known as:  PERICOLACE  Take 2 tablets by mouth 2 (two) times daily as needed for Constipation.     tacrolimus 1 MG Cap  Commonly known as:  PROGRAF  Take 3 capsules (3 mg total) by mouth every morning AND 3 capsules (3 mg total) every evening.     venlafaxine 150 MG Cp24  Commonly known as:  EFFEXOR-XR  Take 1 capsule (150 mg total) by mouth once daily.     zolpidem 5 MG Tab  Commonly known as:  AMBIEN  Take 1 tablet (5 mg total) by mouth nightly as needed for insomnia.        STOP taking these medications    furosemide 80 MG tablet  Commonly known as:  LASIX     hydrALAZINE 25 MG tablet  Commonly known as:  APRESOLINE     mirtazapine 30 MG tablet  Commonly known as:  REMERON     promethazine 25 MG tablet  Commonly known as:  PHENERGAN     sulfamethoxazole-trimethoprim 400-80mg 400-80 mg per tablet  Commonly known as:  RADHA COOK NP  Heart Transplant  [facility]

## 2020-01-28 ENCOUNTER — CLINICAL SUPPORT (OUTPATIENT)
Dept: REHABILITATION | Facility: HOSPITAL | Age: 50
End: 2020-01-28
Payer: COMMERCIAL

## 2020-01-28 ENCOUNTER — OFFICE VISIT (OUTPATIENT)
Dept: TRANSPLANT | Facility: CLINIC | Age: 50
End: 2020-01-28
Payer: COMMERCIAL

## 2020-01-28 ENCOUNTER — LAB VISIT (OUTPATIENT)
Dept: LAB | Facility: HOSPITAL | Age: 50
End: 2020-01-28
Attending: INTERNAL MEDICINE
Payer: COMMERCIAL

## 2020-01-28 ENCOUNTER — HOSPITAL ENCOUNTER (OUTPATIENT)
Facility: HOSPITAL | Age: 50
Discharge: HOME | End: 2020-01-28
Attending: INTERNAL MEDICINE | Admitting: INTERNAL MEDICINE
Payer: COMMERCIAL

## 2020-01-28 VITALS
WEIGHT: 220.25 LBS | HEART RATE: 89 BPM | HEIGHT: 67 IN | BODY MASS INDEX: 34.57 KG/M2 | SYSTOLIC BLOOD PRESSURE: 119 MMHG | DIASTOLIC BLOOD PRESSURE: 72 MMHG

## 2020-01-28 VITALS
SYSTOLIC BLOOD PRESSURE: 148 MMHG | BODY MASS INDEX: 34.46 KG/M2 | WEIGHT: 219.56 LBS | HEART RATE: 94 BPM | DIASTOLIC BLOOD PRESSURE: 79 MMHG | HEIGHT: 67 IN

## 2020-01-28 DIAGNOSIS — Z79.899 ENCOUNTER FOR LONG-TERM (CURRENT) USE OF MEDICATIONS: ICD-10-CM

## 2020-01-28 DIAGNOSIS — D86.85 CARDIAC SARCOIDOSIS: ICD-10-CM

## 2020-01-28 DIAGNOSIS — D84.9 IMMUNOSUPPRESSION: ICD-10-CM

## 2020-01-28 DIAGNOSIS — N18.30 STAGE 3 CHRONIC KIDNEY DISEASE: ICD-10-CM

## 2020-01-28 DIAGNOSIS — Z74.09 IMPAIRED FUNCTIONAL MOBILITY, BALANCE, AND ENDURANCE: ICD-10-CM

## 2020-01-28 DIAGNOSIS — R53.81 PHYSICAL DECONDITIONING: ICD-10-CM

## 2020-01-28 DIAGNOSIS — Z94.1 STATUS POST HEART TRANSPLANT: ICD-10-CM

## 2020-01-28 DIAGNOSIS — G47.01 INSOMNIA DUE TO MEDICAL CONDITION: Primary | ICD-10-CM

## 2020-01-28 DIAGNOSIS — Z94.1 HEART TRANSPLANTED: Primary | ICD-10-CM

## 2020-01-28 DIAGNOSIS — Z94.1 S/P ORTHOTOPIC HEART TRANSPLANT: ICD-10-CM

## 2020-01-28 DIAGNOSIS — Z94.1 HEART REPLACED BY TRANSPLANT: Primary | ICD-10-CM

## 2020-01-28 DIAGNOSIS — T86.298 OTHER COMPLICATION OF HEART TRANSPLANT: ICD-10-CM

## 2020-01-28 DIAGNOSIS — M62.81 MUSCLE WEAKNESS OF LOWER EXTREMITY: ICD-10-CM

## 2020-01-28 DIAGNOSIS — N28.9 RENAL INSUFFICIENCY: ICD-10-CM

## 2020-01-28 LAB
ALBUMIN SERPL BCP-MCNC: 3.7 G/DL (ref 3.5–5.2)
ALP SERPL-CCNC: 83 U/L (ref 55–135)
ALT SERPL W/O P-5'-P-CCNC: 10 U/L (ref 10–44)
ANION GAP SERPL CALC-SCNC: 13 MMOL/L (ref 8–16)
ASCENDING AORTA: 2.04 CM
AST SERPL-CCNC: 8 U/L (ref 10–40)
AV INDEX (PROSTH): 0.79
AV MEAN GRADIENT: 6 MMHG
AV PEAK GRADIENT: 11 MMHG
AV VALVE AREA: 2.49 CM2
AV VELOCITY RATIO: 0.72
BASOPHILS # BLD AUTO: 0.08 K/UL (ref 0–0.2)
BASOPHILS NFR BLD: 0.9 % (ref 0–1.9)
BILIRUB SERPL-MCNC: 0.3 MG/DL (ref 0.1–1)
BNP SERPL-MCNC: 336 PG/ML (ref 0–99)
BSA FOR ECHO PROCEDURE: 2.17 M2
BUN SERPL-MCNC: 24 MG/DL (ref 6–20)
CALCIUM SERPL-MCNC: 9.8 MG/DL (ref 8.7–10.5)
CHLORIDE SERPL-SCNC: 109 MMOL/L (ref 95–110)
CO2 SERPL-SCNC: 24 MMOL/L (ref 23–29)
CREAT SERPL-MCNC: 1.9 MG/DL (ref 0.5–1.4)
CV ECHO LV RWT: 0.29 CM
DIFFERENTIAL METHOD: ABNORMAL
DOP CALC AO PEAK VEL: 1.65 M/S
DOP CALC AO VTI: 27.95 CM
DOP CALC LVOT AREA: 3.1 CM2
DOP CALC LVOT DIAMETER: 2 CM
DOP CALC LVOT PEAK VEL: 1.18 M/S
DOP CALC LVOT STROKE VOLUME: 69.49 CM3
DOP CALCLVOT PEAK VEL VTI: 22.13 CM
E WAVE DECELERATION TIME: 181.64 MSEC
E/A RATIO: 2.03
E/E' RATIO: 14.22 M/S
ECHO LV POSTERIOR WALL: 0.68 CM (ref 0.6–1.1)
EOSINOPHIL # BLD AUTO: 0.1 K/UL (ref 0–0.5)
EOSINOPHIL NFR BLD: 0.8 % (ref 0–8)
ERYTHROCYTE [DISTWIDTH] IN BLOOD BY AUTOMATED COUNT: 17.7 % (ref 11.5–14.5)
EST. GFR  (AFRICAN AMERICAN): 34.9 ML/MIN/1.73 M^2
EST. GFR  (NON AFRICAN AMERICAN): 30.3 ML/MIN/1.73 M^2
FRACTIONAL SHORTENING: 46 % (ref 28–44)
GLUCOSE SERPL-MCNC: 108 MG/DL (ref 70–110)
HBV DNA SERPL NAA+PROBE-ACNC: <10 IU/ML
HBV DNA SERPL NAA+PROBE-LOG IU: <1 LOG (10) IU/ML
HBV DNA SERPL QL NAA+PROBE: NOT DETECTED
HCT VFR BLD AUTO: 29.8 % (ref 37–48.5)
HGB BLD-MCNC: 8.7 G/DL (ref 12–16)
IMM GRANULOCYTES # BLD AUTO: 0.17 K/UL (ref 0–0.04)
IMM GRANULOCYTES NFR BLD AUTO: 2 % (ref 0–0.5)
INTERVENTRICULAR SEPTUM: 0.64 CM (ref 0.6–1.1)
LEFT INTERNAL DIMENSION IN SYSTOLE: 2.5 CM (ref 2.1–4)
LEFT VENTRICLE DIASTOLIC VOLUME INDEX: 46.73 ML/M2
LEFT VENTRICLE DIASTOLIC VOLUME: 98.31 ML
LEFT VENTRICLE MASS INDEX: 44 G/M2
LEFT VENTRICLE SYSTOLIC VOLUME INDEX: 10.6 ML/M2
LEFT VENTRICLE SYSTOLIC VOLUME: 22.4 ML
LEFT VENTRICULAR INTERNAL DIMENSION IN DIASTOLE: 4.62 CM (ref 3.5–6)
LEFT VENTRICULAR MASS: 92.93 G
LV LATERAL E/E' RATIO: 12.8 M/S
LV SEPTAL E/E' RATIO: 16 M/S
LYMPHOCYTES # BLD AUTO: 0.5 K/UL (ref 1–4.8)
LYMPHOCYTES NFR BLD: 5.8 % (ref 18–48)
MAGNESIUM SERPL-MCNC: 1.7 MG/DL (ref 1.6–2.6)
MCH RBC QN AUTO: 26.7 PG (ref 27–31)
MCHC RBC AUTO-ENTMCNC: 29.2 G/DL (ref 32–36)
MCV RBC AUTO: 91 FL (ref 82–98)
MONOCYTES # BLD AUTO: 0.7 K/UL (ref 0.3–1)
MONOCYTES NFR BLD: 7.8 % (ref 4–15)
MV PEAK A VEL: 0.63 M/S
MV PEAK E VEL: 1.28 M/S
NEUTROPHILS # BLD AUTO: 7.1 K/UL (ref 1.8–7.7)
NEUTROPHILS NFR BLD: 82.7 % (ref 38–73)
NRBC BLD-RTO: 0 /100 WBC
PISA TR MAX VEL: 2.99 M/S
PLATELET # BLD AUTO: 372 K/UL (ref 150–350)
PMV BLD AUTO: 9.4 FL (ref 9.2–12.9)
POTASSIUM SERPL-SCNC: 4.3 MMOL/L (ref 3.5–5.1)
PROT SERPL-MCNC: 6.6 G/DL (ref 6–8.4)
RA PRESSURE: 8 MMHG
RBC # BLD AUTO: 3.26 M/UL (ref 4–5.4)
RIGHT VENTRICULAR END-DIASTOLIC DIMENSION: 4.59 CM
RV TISSUE DOPPLER FREE WALL SYSTOLIC VELOCITY 1 (APICAL 4 CHAMBER VIEW): 6.11 CM/S
SINUS: 2.48 CM
SODIUM SERPL-SCNC: 146 MMOL/L (ref 136–145)
STJ: 2.2 CM
TACROLIMUS BLD-MCNC: 9.1 NG/ML (ref 5–15)
TDI LATERAL: 0.1 M/S
TDI SEPTAL: 0.08 M/S
TDI: 0.09 M/S
TR MAX PG: 36 MMHG
TRICUSPID ANNULAR PLANE SYSTOLIC EXCURSION: 1.49 CM
TV REST PULMONARY ARTERY PRESSURE: 44 MMHG
WBC # BLD AUTO: 8.58 K/UL (ref 3.9–12.7)

## 2020-01-28 PROCEDURE — 88307 TISSUE EXAM BY PATHOLOGIST: CPT | Mod: 26,,, | Performed by: PATHOLOGY

## 2020-01-28 PROCEDURE — 85025 COMPLETE CBC W/AUTO DIFF WBC: CPT

## 2020-01-28 PROCEDURE — 3074F PR MOST RECENT SYSTOLIC BLOOD PRESSURE < 130 MM HG: ICD-10-PCS | Mod: CPTII,S$GLB,, | Performed by: INTERNAL MEDICINE

## 2020-01-28 PROCEDURE — 83735 ASSAY OF MAGNESIUM: CPT

## 2020-01-28 PROCEDURE — C1894 INTRO/SHEATH, NON-LASER: HCPCS | Performed by: INTERNAL MEDICINE

## 2020-01-28 PROCEDURE — 80197 ASSAY OF TACROLIMUS: CPT

## 2020-01-28 PROCEDURE — 36415 COLL VENOUS BLD VENIPUNCTURE: CPT

## 2020-01-28 PROCEDURE — 99215 OFFICE O/P EST HI 40 MIN: CPT | Mod: S$GLB,,, | Performed by: INTERNAL MEDICINE

## 2020-01-28 PROCEDURE — 93505 PR BIOPSY OF HEART LINING: ICD-10-PCS | Mod: 26,,, | Performed by: INTERNAL MEDICINE

## 2020-01-28 PROCEDURE — 93505 ENDOMYOCARDIAL BIOPSY: CPT | Mod: 26,,, | Performed by: INTERNAL MEDICINE

## 2020-01-28 PROCEDURE — 97162 PT EVAL MOD COMPLEX 30 MIN: CPT | Mod: PO | Performed by: PHYSICAL THERAPIST

## 2020-01-28 PROCEDURE — 3008F BODY MASS INDEX DOCD: CPT | Mod: CPTII,S$GLB,, | Performed by: INTERNAL MEDICINE

## 2020-01-28 PROCEDURE — 88342 IMHCHEM/IMCYTCHM 1ST ANTB: CPT | Mod: 26,,, | Performed by: PATHOLOGY

## 2020-01-28 PROCEDURE — 93505 ENDOMYOCARDIAL BIOPSY: CPT | Performed by: INTERNAL MEDICINE

## 2020-01-28 PROCEDURE — 3078F PR MOST RECENT DIASTOLIC BLOOD PRESSURE < 80 MM HG: ICD-10-PCS | Mod: CPTII,S$GLB,, | Performed by: INTERNAL MEDICINE

## 2020-01-28 PROCEDURE — 27201423 OPTIME MED/SURG SUP & DEVICES STERILE SUPPLY: Performed by: INTERNAL MEDICINE

## 2020-01-28 PROCEDURE — 83880 ASSAY OF NATRIURETIC PEPTIDE: CPT

## 2020-01-28 PROCEDURE — 3008F PR BODY MASS INDEX (BMI) DOCUMENTED: ICD-10-PCS | Mod: CPTII,S$GLB,, | Performed by: INTERNAL MEDICINE

## 2020-01-28 PROCEDURE — 88342 CHG IMMUNOCYTOCHEMISTRY: ICD-10-PCS | Mod: 26,,, | Performed by: PATHOLOGY

## 2020-01-28 PROCEDURE — 88307 PR  SURG PATH,LEVEL V: ICD-10-PCS | Mod: 26,,, | Performed by: PATHOLOGY

## 2020-01-28 PROCEDURE — 3078F DIAST BP <80 MM HG: CPT | Mod: CPTII,S$GLB,, | Performed by: INTERNAL MEDICINE

## 2020-01-28 PROCEDURE — 88307 TISSUE EXAM BY PATHOLOGIST: CPT | Performed by: PATHOLOGY

## 2020-01-28 PROCEDURE — 25000003 PHARM REV CODE 250: Performed by: INTERNAL MEDICINE

## 2020-01-28 PROCEDURE — 99999 PR PBB SHADOW E&M-EST. PATIENT-LVL IV: CPT | Mod: PBBFAC,,, | Performed by: INTERNAL MEDICINE

## 2020-01-28 PROCEDURE — 99999 PR PBB SHADOW E&M-EST. PATIENT-LVL IV: ICD-10-PCS | Mod: PBBFAC,,, | Performed by: INTERNAL MEDICINE

## 2020-01-28 PROCEDURE — 3074F SYST BP LT 130 MM HG: CPT | Mod: CPTII,S$GLB,, | Performed by: INTERNAL MEDICINE

## 2020-01-28 PROCEDURE — 80053 COMPREHEN METABOLIC PANEL: CPT

## 2020-01-28 PROCEDURE — 99215 PR OFFICE/OUTPT VISIT, EST, LEVL V, 40-54 MIN: ICD-10-PCS | Mod: S$GLB,,, | Performed by: INTERNAL MEDICINE

## 2020-01-28 PROCEDURE — 88342 IMHCHEM/IMCYTCHM 1ST ANTB: CPT | Performed by: PATHOLOGY

## 2020-01-28 RX ORDER — TACROLIMUS 1 MG/1
CAPSULE ORAL
Qty: 180 CAPSULE | Refills: 11 | Status: SHIPPED | OUTPATIENT
Start: 2020-01-28 | End: 2020-02-13

## 2020-01-28 RX ORDER — SODIUM CHLORIDE 0.9 G/100ML
IRRIGANT IRRIGATION
Status: DISCONTINUED | OUTPATIENT
Start: 2020-01-28 | End: 2020-01-28 | Stop reason: HOSPADM

## 2020-01-28 RX ORDER — LIDOCAINE HYDROCHLORIDE 20 MG/ML
INJECTION, SOLUTION INFILTRATION; PERINEURAL
Status: DISCONTINUED | OUTPATIENT
Start: 2020-01-28 | End: 2020-01-28 | Stop reason: HOSPADM

## 2020-01-28 RX ORDER — TACROLIMUS 1 MG/1
CAPSULE ORAL
Qty: 180 CAPSULE | Refills: 11 | Status: SHIPPED | OUTPATIENT
Start: 2020-01-28 | End: 2020-01-28 | Stop reason: SDUPTHER

## 2020-01-28 NOTE — PROGRESS NOTES
DISCHARGE NOTE:    Deborah Navas is a 49 y.o. female s/p  heart transplant on 12/16/2019 admitted for worsening nausea and continued dyspnea on exertion.    Past Medical History:   Diagnosis Date    Fractures     History of ventricular fibrillation 9/4/2019    History of ventricular tachycardia 9/4/2019    Hyperlipidemia     Migraine     Multinodular goiter 9/5/2019    Osteoarthritis        CMV High Risk  HBV/HCV -/-    Hospital Course: 50yo F s/p OHTx on 12/16/2019 for NICM/ HFrEF with HM3 on 9/2019, removed at time of transplant, HLD, obesity, and OA, who presented for worsening nausea and continued dyspnea on exertion. MMF was decreased to 500 mg BID and bactrim discontinued and her nausea improved. Patient was on theophylline on prior admission for bradycardia but was discontinued due to severe shakes and tremors. Started on terbutaline as patient had persistent symptomatic bradycardia. Norvasc decreased from 10 mg to 5 mg daily. She was given one dose of pentamidine inpatient for PCP px. G6P lab sent out and no deficiency noted, so dapsone was started for PCP px. Cr continued to improve and lasix was held. Overall improved nausea, bradycardia and renal function on discharge.      EMB: 1.7 ISHLT0/ pAMR 0   HLA: 12.31 No DSA   TTE: 1.7 EF 65%    Allergies:   Review of patient's allergies indicates:   Allergen Reactions    Adhesive Blisters     Reaction to area in chest and up only    Codeine Itching       Patient Pharmacy: [facility]    Pharmacy Interventions/Recommendations:    1) Transplant Immunosuppression:  FK 3mg BID (goal 8-10 for CKD);  mg BID (reduced due to severe nausea); Prednisone 10mg orally daily     2) Opportunistic Infection prophylaxis: Valcyte 450mg QOD, dapsone 100 mg daily (Bactrim d/c'd 2/2 nausea and worsening kidney function)    3) Patient Counseling/Education: Completed, new medication administration card provided    4) DAA Consideration:   N/A    5) Patient Assistance Information: N/A    6) The following medications have been placed on HOLD and should be restarted in the outpatient setting (when appropriate):      Discharge Medications:   YosefDeborah Hazel   Home Medication Instructions MARLEY:[id number]    Printed on:01/28/20 1360   Medication Information                      amLODIPine (NORVASC) 5 MG tablet  Take 1 tablet (5 mg total) by mouth once daily.             aspirin (ECOTRIN) 81 MG EC tablet  Take 1 tablet (81 mg total) by mouth once daily.             atorvastatin (LIPITOR) 20 MG tablet  Take 1 tablet (20 mg total) by mouth once daily.             blood sugar diagnostic Strp  Use to test blood glucose 2 (two) times daily.             blood-glucose meter Misc  USE AS DIRECTED             calcium carbonate-vitamin D3 1,000 mg(2,500 mg)-800 unit Tab  Take 1 tablet by mouth once daily.             dapsone 100 MG Tab  Take 1 tablet (100 mg total) by mouth once daily.             ferrous sulfate (FEOSOL) 325 mg (65 mg iron) Tab tablet  Take 1 tablet (325 mg total) by mouth 2 (two) times daily.             gabapentin (NEURONTIN) 300 MG capsule  Take 1 capsule (300 mg total) by mouth every evening.             lancets Misc  Use to test blood glucose 2 (two) times daily.             magnesium oxide (MAG-OX) 400 mg (241.3 mg magnesium) tablet  Take 1 tablet (400 mg total) by mouth once daily.             mycophenolate (CELLCEPT) 250 mg Cap  Take 2 capsules (500 mg total) by mouth 2 (two) times daily.             [facility] transplant care kit  Welcome to [facility].  This is your transplant care kit.             oxyCODONE (ROXICODONE) 10 mg Tab immediate release tablet  Take 1 tablet (10 mg total) by mouth every 8 (eight) hours as needed.             pantoprazole (PROTONIX) 40 MG tablet  Take 1 tablet (40 mg total) by mouth once daily.             predniSONE (DELTASONE) 5 MG tablet  Take 2 tablets (10 mg total) by mouth once daily.              senna-docusate 8.6-50 mg (PERICOLACE) 8.6-50 mg per tablet  Take 2 tablets by mouth 2 (two) times daily as needed for Constipation.             SITagliptin (JANUVIA) 50 MG Tab  Take 1 tablet (50 mg total) by mouth once daily.             tacrolimus (PROGRAF) 1 MG Cap  Take 3 capsules (3 mg total) by mouth every morning AND 3 capsules (3 mg total) every evening.             terbutaline (BRETHINE) 5 mg Tab  Take 1 tablet (5 mg total) by mouth 3 (three) times daily.             valGANciclovir (VALCYTE) 450 mg Tab  Take 1 tablet (450 mg total) by mouth once daily.             venlafaxine (EFFEXOR-XR) 150 MG Cp24  Take 1 capsule (150 mg total) by mouth once daily.             zolpidem (AMBIEN) 5 MG Tab  Take 1 tablet (5 mg total) by mouth nightly as needed for insomnia.                 Deborah and her caregiver verbalized their understanding and had the opportunity to ask questions.

## 2020-01-28 NOTE — DISCHARGE INSTRUCTIONS

## 2020-01-28 NOTE — PHYSICIAN QUERY
PT Name: Deborah Navas  MR #: [id number]  Physician Query Form - Renal Condition Clarification     CDS/: Zoltan Jean-Baptiste Jr, RN               Contact information:[email or web address]     This form is a permanent document in the medical record.     QueryDate: January 28, 2020    By submitting this query, we are merely seeking further clarification of documentation. Please utilize your independent clinical judgment when addressing the question(s) below.    The Medical record contains the following:   Indicator Supporting Clinical Findings Location in Medical Record    Kidney (Renal) Insufficiency     x Kidney (Renal) Failure / Injury  Admitted for worsenign  renal failure. Likely multifactorial (dehydration, CNI induced and Bactrim). Off bactrim and lasix.   1/26 Heart Transplant Progress Note      Nephrotoxic Agents     x BUN/Creatinine GFR BUN=26-->25  Creatinine=2.3-->1.9  GFR=24.1-->30.3 1/20-1/27 Labs  1/20-1/27 Labs  1/20-1/27 Labs      Urine: Casts         Eosinophils      Dehydration      Nausea/Vomiting      Dialysis/CRRT     x Treatment:  CKD (chronic kidney disease)  -Cr improved to 1.9 this AM.  - Encourage PO intake  -Holding lasix. d/c'd bactrim.  -Will continue to monitor.     1/27 Heart Transplant Progress Note   x Other:   Provider, please further specify the stage of CKD:   [   x]  III   Moderately reduced kidney function    who presented for worsening nausea and continued dyspnea on exertion    Was recently admitted with similar symptoms/GLO in which she was diuresed and BP medications adjusted with improvement in renal function 1/24 Other Documentation         1/27 Discharge Summary      1/27 Discharge Summary   Acute Kidney Injury / Acute Renal Failure has different defining criteria. A generally accepted guideline  is:   A greater than 100% (2X) rise in serum creatinine from baseline* occurring during the course of a single hospital stay.   *Baseline as determined by the  providers judgment and consideration of previous lab values and other documentation, if available.    A diagnosis of Acute Kidney Injury/ Acute Renal Failure should incorporate abnormal labs and clinical findings that are clinically significant      References: 1. Lexa et al. Acute renal failure-definition, outcome measures, animal models, fluid therapy and information technology needs: the Second International Consensus Conference of the Acute Dialysis Quality Initiative (ADQI) Group. Crit Care 2004; 8:B204; 2. Yusra et al. Acute Kidney Injury Network: report of an initiative to improve outcomes in acute kidney injury. Crit Care 2007; 11:R31; 3. Kidney Disease: Improving Global Outcomes (KDIGO). Acute Kidney Injury Work Group. KDIGO clinical practice guidelines for acute kidney injury. Kidney Int Suppl 2012; 2:1.    The clinical guidelines noted below is only a system guideline, it does not replace the providers clinical judgment.    Provider, please specify the diagnosis or diagnoses associated with above clinical findings.  Further Specify the Worsening Renal Failure    [ x  ]  Acute Kidney Failure/Injury On Chronic Kidney Disease (CKD) stage 3   Moderately reduced kidney function eGFR 30-59      [   ] Chronic Kidney Disease (CKD) stage 3 Only   Moderately reduced kidney function eGFR 30-59     [   ] Other (please specify): _________________________________     [   ]  Clinically Undetermined       Please document in your progress notes daily for the duration of treatment until resolved and include in your discharge summary.

## 2020-01-28 NOTE — PROGRESS NOTES
[facility] OUTPATIENT THERAPY AND WELLNESS  Physical Therapy Initial Evaluation    Name: Deborah Navas  Clinic Number: [id number]    Therapy Diagnosis:   Encounter Diagnoses   Name Primary?    Muscle weakness of lower extremity     Impaired functional mobility, balance, and endurance     Physical deconditioning      Physician: Americo Albert NP    Physician Orders: PT Eval and Treat deconditioned / weakness  Medical Diagnosis from Referral:   Z94.1 (ICD-10-CM) - Heart transplanted    Evaluation Date: 1/28/2020  Authorization Period Expiration: 1/21/2021  Plan of Care Expiration: 4/28/2020  Visit # / Visits authorized: 1/ 1    Time In: 1615  Time Out: 1715  Total Time: 60 minutes    Precautions: Standard / fall     Subjective       Medical History:   Past Medical History:   Diagnosis Date    Fractures     History of ventricular fibrillation 9/4/2019    History of ventricular tachycardia 9/4/2019    Hyperlipidemia     Migraine     Multinodular goiter 9/5/2019    Osteoarthritis     Restrictive cardiomyopathy post heart transplant        Surgical History:   Deborah Navas  has a past surgical history that includes Tympanostomy tube placement (1971- 1979); Tonsillectomy; eardrum reconstruction (1980); Temporomandibular joint surgery (Right, 1988); Sinus surgery (Right, 1994); Forearm fracture surgery (Bilateral, 9563-3868); Elbow surgery (Left, 8115-6221); Lumbar fusion (2007); Back surgery; Bone graft (Left); Insertion of pacemaker (Left); Insertion of implantable cardioverter-defibrillator (ICD) generator with two existing leads; Right heart catheterization (Right, 9/4/2019); Left ventricular assist device (N/A, 9/10/2019); Insertion of graft to pericardium (9/10/2019); Sternal wound closure (9/10/2019); Treatment of cardiac arrhythmia (N/A, 9/24/2019); Right heart catheterization (N/A, 11/18/2019); Heart transplant (N/A, 12/16/2019); Biopsy with ultrasound guidance (N/A, 12/31/2019);  "Right heart catheterization (Right, 12/31/2019); Biopsy with ultrasound guidance (N/A, 1/7/2020); Right heart catheterization (Right, 1/7/2020); Biopsy with ultrasound guidance (N/A, 1/14/2020); and Biopsy with ultrasound guidance (N/A, 1/28/2020).    Medications:   Deborah has a current medication list which includes the following prescription(s): amlodipine, aspirin, atorvastatin, calcium carbonate-vitamin d3, dapsone, ferrous sulfate, gabapentin, magnesium oxide, mycophenolate, opw transplant care kit, oxycodone, pantoprazole, prednisone, senna-docusate 8.6-50 mg, sitagliptin, tacrolimus, terbutaline, valganciclovir, venlafaxine, and zolpidem.    Allergies:   Review of patient's allergies indicates:   Allergen Reactions    Adhesive Blisters     Reaction to area in chest and up only    Codeine Itching           History of current condition - Deborah reports: weakness with decreased endurance and feeling of "jelly legs"   Date of onset: 12/16/2019 - underwent heart transplant surgery. Hospitalized for two weeks. DC to home for HH PT but then readmitted for one week. HH PT x2 then readmitted for one week again and DC yesterday   Says since the surgery there has a been a reduction of strength and endurance. Says she becomes SOB very quickly. She was told by MD after further work-up that these symptoms are not heart or lung related but just decompensation.       Pain:  Current 0/10, worst 0/10, best 0/10     Aggravating Factors: Walking     Current Level of Function:  Falls - x1 stepping off uneven sidewalk one week ago.   Standing - not sure but maybe 3 min    Walking - 75% compensated -walk about 3 min   Stairs - NA   Personal - showering, can stand for 10 min to shower but leans at wall.   Domestic - not making bed, not doing laundry, can sweep one room, only, no other ADL   Community / social - not engaging at this time   Occupation - employed but not working at present time   Recreation / fitness / sports  " - did not participate before surgery.   Prior Therapy: home health after DC only x2 then readmitted to hospital   Social History: single  lives with an adult , friend, who serves as care giver in an apartment. No stairs   Occupation:    Prior Level of Function: compromised. Recovering from LVAD surgery prior to recent surgery.   Pts goals: to feel safe walking and be able to do so for longer than 3 minutes.   Imaging, X-Rays : 1/20/2010 chest             Objective     Physical Appearance: patient is not demonstrating outward distress or dyspnea.       ROM:  UPPER EXTREMITY    AROM/PROM  (R) UE: WFLs shoulder, elbow, wrist   (L) UE: WFLs shoulder, elbow, wrist      Shoulder R L    MMT MMT   flexion 3+/5 3+/5   extension 3+/5 3+/5   abduction 3+/5 3+/5   Internal rotation 4-/5 4-/5   ER at 90° abd NT NT   ER at 0° abd 3+/5 3+/5          ROM--  LOWER EXTREMITIES    AROM/PROM   (R) LE: WFLs hip, knee, ankle  Hamstrings Length: 70 degrees  Ankle Dorsiflexion:   20 degrees   (L) LE: WFLs hip, knee, ankle   Hamstrings Length: 70 degrees  Ankle Dorsiflexion: 20 degrees    Strength  Right LE  Left LE    Knee extension: 3+/5 Knee extension: 3+/5   Knee flexion: 4-/5 Knee flexion: 4-/5   Hip flexion: 3+/5 Hip flexion: 3+/5   Hip extension:  2+/5 Hip extension: 2+/5   Hip abduction: 3-/5 Hip abduction: 3-/5   Ankle dorsiflexion: 4-/5 Ankle dorsiflexion: 4-/5   Ankle plantarflexion 3/5 Ankle plantarflexion  3/5   Ankle inversion  4-/5 Ankle inversion  4-/5   Ankle eversion: 3/5 Ankle eversion  3/5     Gait: Without AD  Analysis: ambulates with a Trendelenburg on the left.   Step length and height :        Right swing foot:  Passes left stance foot =1 and Completely clears floor with step =1  and Left swing foot:  Passes right stance foot= 1 and Completely clears floor with step =1  12. Step symmetry         Right & Left step length appear equal = 1  13. Step continuity :        Steps appear continuous =  "1  14. Path :        Straight without walking aid = 1  15. Trunk :          No sway, but flexion of knees or back or spreads arm out while walking = 1  16. Walking stance width          Heels apart = 0    Bed Mobility:Independent  Transfers: Independent  Special Tests:  Sit to stand = requires use of hands  Standing unsupported - 3' w/o support, no sway   Sit - 5' no compensations   Get up and Go - without compromise but hesitation   Turn 360 - smooth and safe   Pivot and turn - without instability or hesitation   Single leg stand - maintains 10" eyes open stable surface  Floor  - performs with a squat and without signs of instability           CMS Impairment/Limitation/Restriction for FOTO  Survey not performed        Education provided:   -discussed approach to therapy and monitoring of BP, O2 sats. OH     Assessment     Deborah is a 49 y.o. female referred to outpatient Physical Therapy with a medical diagnosis of heart transplant. Pt presents with clinical findings of deconditioning, LE weakness, decreased muscle endurance and stability challenges.   Pt prognosis is Fair.   Pt will benefit from skilled outpatient Physical Therapy to address the deficits stated above and in the chart below, provide pt/family education, and to maximize pt's level of independence.     Plan of care discussed with patient: Yes  Pt's spiritual, cultural and educational needs considered and patient is agreeable to the plan of care and goals as stated below:     Anticipated Barriers for therapy: tolerance level     Medical Necessity is demonstrated by the following  History  Co-morbidities and personal factors that may impact the plan of care Co-morbidities:   prior lumbar surgery and hx of heart disease     Personal Factors:   coping style  attitudes     Moderate    Examination  Body Structures and Functions, activity limitations and participation restrictions that may impact the plan of care Body Regions:   lower " extremities  upper extremities  trunk  aerobic level     Body Systems:    gross symmetry  strength  balance  gait  heart rate  respiratory rate  blood pressure  aerobic level    Participation Restrictions: cardiorespiratory challenges     Activity limitations:   Learning and applying knowledge  no deficits    General Tasks and Commands  no deficits    Communication  no deficits    Mobility  lifting and carrying objects  walking  driving (bike, car, motorcycle)    Self care  washing oneself (bathing, drying, washing hands)    Domestic Life  shopping  cooking  doing house work (cleaning house, washing dishes, laundry)    Interactions/Relationships  no deficits    Life Areas  no deficits    Community and Social Life  community life  recreation and leisure         moderate   Clinical Presentation evolving clinical presentation with changing clinical characteristics moderate   Decision Making/ Complexity Score: moderate       Short Term Goals (5 Weeks):      1.Pt to increase BLE strength by a 1/2 grade of muscles test to allow for improvement in gait and stability  2.Pt to improve her ability to rise to stand with minimal use of her hands for improved functional mobility  3.Pt to report compliance with HEP and demonstrate proper exercise technique to PT to show competence with self management of condition  4.Pt to tolerate therapy session and and perform exercise reps at 30 or greater BUE / BLE not  demonstrating dyspnea or significant fatigue to improve overall tolerance for daily function and mobility       Long Term Goals ( 10 Weeks):      1. Pt to demonstrate get up and go without hesitation and ambulate without deviation for improved gait pattern  2. Pt to increase walking intervals by 50 to 75% to demonstrate improved tolerance and functional mobility  3. Pt will be independent with home exercise program for continued management of her condition   4. Pt to demonstrate dynamic unilateral standing stability on  unstable surface for improved quality of movement   5. Pt to report improved endurance demonstrated by ambulation and exercise tolerance to increased functional domestic ADL and personal management         Plan     Plan of care Certification: 1/28/2020 to 4/28/2020.    Outpatient Physical Therapy 2 times weekly for 10 weeks to include the following interventions: Gait Training, Neuromuscular Re-ed, Patient Education and Therapeutic Exercise.     Javier Maldonado, PT      Therapist: Javier Maldonado, PT

## 2020-01-28 NOTE — DISCHARGE SUMMARY
Admit 01/28/2020  Discharge  01/28/2020  Discharge / Principle diagnosis  heart transplant   Discharge attending Jolene Lua MD  Hospital course.  Came in for echo biopsy. Tolerated procedure well (see full results in cardiac procedure section).  Results discussed with patient / caregiver.   Discharge condition / disposition Good / home   discharge activity activity as tolerated    Discharge diet diet as tolerated / unchanged from admission  Discharge medication   No current facility-administered medications on file prior to encounter.      Current Outpatient Medications on File Prior to Encounter   Medication Sig    aspirin (ECOTRIN) 81 MG EC tablet Take 1 tablet (81 mg total) by mouth once daily.    atorvastatin (LIPITOR) 20 MG tablet Take 1 tablet (20 mg total) by mouth once daily.    blood sugar diagnostic Strp Use to test blood glucose 2 (two) times daily.    blood-glucose meter Misc USE AS DIRECTED    calcium carbonate-vitamin D3 1,000 mg(2,500 mg)-800 unit Tab Take 1 tablet by mouth once daily.    ferrous sulfate (FEOSOL) 325 mg (65 mg iron) Tab tablet Take 1 tablet (325 mg total) by mouth 2 (two) times daily.    gabapentin (NEURONTIN) 300 MG capsule Take 1 capsule (300 mg total) by mouth every evening.    lancets Misc Use to test blood glucose 2 (two) times daily.    magnesium oxide (MAG-OX) 400 mg (241.3 mg magnesium) tablet Take 1 tablet (400 mg total) by mouth once daily.    oxyCODONE (ROXICODONE) 10 mg Tab immediate release tablet Take 1 tablet (10 mg total) by mouth every 8 (eight) hours as needed.    senna-docusate 8.6-50 mg (PERICOLACE) 8.6-50 mg per tablet Take 2 tablets by mouth 2 (two) times daily as needed for Constipation.    SITagliptin (JANUVIA) 50 MG Tab Take 1 tablet (50 mg total) by mouth once daily.    venlafaxine (EFFEXOR-XR) 150 MG Cp24 Take 1 capsule (150 mg total) by mouth once daily.     Followup in clinic as scheduled

## 2020-01-28 NOTE — PROGRESS NOTES
Subjective:   Ms. Navas is a 49 y.o. year old White female who received a donation after brain death heart transplant on 12/16/19.      CMV status:   Donor: +  Recipient: -    HPI     Recently discharged from the hospital after heart transplantation. Renal faliure and tremors are issues. Hospital follow up (see below)    Doing better after hospitalization. Now on terbutaline Tremors are an issue. She is on dapsone    Nausea is better./ Creatinine 1.9     Prograf 3 mg bid, Cell Cept 500 mg bid, Prednisone 10 mg      HPI: 50yo F s/p OHTx on 12/16/2019 for NICM/ HFrEF with HM3 on 9/2019, removed at time of transplant, HLD, obesity, and OA, who presented for worsening nausea and continued dyspnea on exertion. The pathology of native heart revealed non-caseating granulomas typical of sarcoidosis. Was recently admitted with similar symptoms/GLO in which she was diuresed and BP medications adjusted with improvement in renal function.  RHC on 1/7 with RA 12, wedge 15 and high CO/CI. TTE revealed LVEF 65% and normal RV function, mild MR, mod-sev TR, IVC not seen; no pericardial effusion appreciated. She also had significant tremors on admission and theophylline levels were found to be severely elevated so it was discontinued, with some improvement. EMB from 12/31 and 1/7 were both negative for Ab or cell-mediated rejection. She states that she did OK when initially discharged but has been getting progressively more nauseous over the last few days. Denies any sick contacts. No diarrhea. Vomiting occasionally but mostly dry heaves. She also still complains of dyspnea with minimal exertion which has not imrpoved since surgery.        Hospital Course: Pt was admitted and gently diuresed with IVP Lasix. In addition, MMF dose was decreased and Bactrim was stopped. Her nausea improved rapidly and she continued to work with PT/OT. She was transitioned to terbutaline from theophylline which she tolerated well. Her dyspnea improved  as well throughout stay. She was given dose of pentamidine with plans to start dapsone as an outpt. She was discharged home in good condition with plans to f/u with biopsy tomorrow.         January 28 2020    · HARSH Bx Study.  · Normal left ventricular systolic function. The estimated ejection fraction is 63%.  · Septal wall has abnormal motion.  · Indeterminate left ventricular diastolic function.  · Mild right ventricular enlargement.  · Low normal right ventricular systolic function.  · Mild mitral regurgitation.  · Mild to moderate tricuspid regurgitation.  · Mild pulmonic regurgitation.  · Intermediate central venous pressure (8 mmHg).  · The estimated PA systolic pressure is 44 mmHg.  · Pulmonary hypertension present      · Post cardiac transplantation study - OHTx 12/16/19  · Normal left ventricular systolic function. The estimated ejection fraction is 65%.  · Normal right ventricular systolic function.  · Normal LV diastolic function.  · Mild tricuspid regurgitation.  · The estimated PA systolic pressure is 35 mmHg.  · Normal central venous pressure (3 mmHg).  · No evident complications following endomyocardial biopsy.      Hospital Course: She was started on IV diuretic and low dobutamine due to concern for RV failure. Repeat RHC on 1/7 with upper normal filling pressures (RA 12, wedge 15) and high CO/CI. TTE revealed LVEF 65% and normal RV function, mild MR, mod-sev TR, IVC not seen; no pericardial effusion appreciated. She was switched to PO furosemide and her renal function improved slightly (last creat 2.3, from 3.2). She also had significant tremors on admission and theophylline levels were found to be severely elevated so it was discontinued, with some improvement. Tacrolimus dose was adjusted according to levels for a goal of 8, last level today is 5.6. BP was above goal and she was started on amlodipine and hydralazine with good response. EMB from 12/31 and 1/7 were both negative for Ab or  "cell-mediated rejection. There was also concern for increasing size of R groin hematoma that was first noted post-transplant, CTS evaluated patient and recommended conservative management and observation. Patient is currently stable to be discharged home and follow-up as outpatient; she has an appointment for her next EMB on 1/14/2020          0 Review of Systems   Constitution: Negative for chills, decreased appetite, diaphoresis, fever, malaise/fatigue, night sweats, weight gain and weight loss.   Cardiovascular: Negative for chest pain, claudication, cyanosis, dyspnea on exertion, irregular heartbeat, leg swelling, near-syncope, orthopnea and palpitations.   Respiratory: Negative for cough, hemoptysis, shortness of breath, sleep disturbances due to breathing, snoring, sputum production and wheezing.    Gastrointestinal: Negative for abdominal pain and diarrhea.   Neurological: Negative for weakness.       Objective:   Blood pressure 119/72, pulse 89, height 5' 7" (1.702 m), weight 99.9 kg (220 lb 3.8 oz).body mass index is 34.49 kg/m².    Physical Exam   Constitutional: She is oriented to person, place, and time. She appears well-developed and well-nourished.   HENT:   Head: Normocephalic and atraumatic.   Eyes: Pupils are equal, round, and reactive to light. Conjunctivae and EOM are normal.   Neck: Neck supple. No JVD present. No tracheal deviation present. No thyromegaly present.   Cardiovascular: Normal rate, regular rhythm and normal heart sounds. Exam reveals no gallop and no friction rub.   No murmur heard.  Pulmonary/Chest: Effort normal and breath sounds normal. No respiratory distress. She has no wheezes. She has no rales. She exhibits no tenderness.   Abdominal: Soft. Bowel sounds are normal. She exhibits no distension and no mass. There is no tenderness. There is no rebound and no guarding.   Musculoskeletal: Normal range of motion. She exhibits no edema or tenderness.   Lymphadenopathy:     She has no " cervical adenopathy.   Neurological: She is alert and oriented to person, place, and time. She has normal reflexes. No cranial nerve deficit. She exhibits normal muscle tone. Coordination normal.   Skin: Skin is warm and dry.   Psychiatric: She has a normal mood and affect. Her behavior is normal. Thought content normal.       Lab Results   Component Value Date    WBC 8.58 01/28/2020    HGB 8.7 (L) 01/28/2020    HCT 29.8 (L) 01/28/2020    MCV 91 01/28/2020     (H) 01/28/2020    CO2 24 01/28/2020    CREATININE 1.9 (H) 01/28/2020    CALCIUM 9.8 01/28/2020    ALBUMIN 3.7 01/28/2020    AST 8 (L) 01/28/2020     (H) 01/28/2020    ALT 10 01/28/2020       Lab Results   Component Value Date    INR 1.2 12/23/2019    INR 1.4 (H) 12/22/2019    INR 1.3 (H) 12/21/2019       BNP   Date Value Ref Range Status   01/28/2020 336 (H) 0 - 99 pg/mL Final     Comment:     Values of less than 100 pg/ml are consistent with non-CHF populations.   01/25/2020 308 (H) 0 - 99 pg/mL Final     Comment:     Values of less than 100 pg/ml are consistent with non-CHF populations.   01/21/2020 486 (H) 0 - 99 pg/mL Final     Comment:     Values of less than 100 pg/ml are consistent with non-CHF populations.       LD   Date Value Ref Range Status   12/16/2019 240 110 - 260 U/L Final     Comment:     Results are increased in hemolyzed samples.   12/15/2019 246 110 - 260 U/L Final     Comment:     Results are increased in hemolyzed samples.   12/14/2019 257 110 - 260 U/L Final     Comment:     Results are increased in hemolyzed samples.       Tacrolimus Lvl   Date Value Ref Range Status   01/27/2020 8.6 5.0 - 15.0 ng/mL Final     Comment:     Testing performed by Liquid Chromatography-Tandem  Mass Spectrometry (LC-MS/MS).  This test was developed and its performance characteristics  determined by [facility], Department of Pathology  and Laboratory Medicine in a manner consistent with CLIA  requirements. It has not been cleared or  approved by the [place]  Food and Drug Administration.  This test is used for clinical   purposes.  It should not be regarded as investigational or for  research.       No results found for: SIROLIMUS  No results found for: CYCLOSPORINE    No results found for this or any previous visit.  No results found for this or any previous visit.    Labs were reviewed with the patient.    Assessment:     1. Heart transplanted    2. Stage 3 chronic kidney disease    3. Immunosuppression    4. 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 Progressing well Tremor are an issue     Sleep study to use Oxygen    Return instructions as set forth by post transplant schedule or as needed:    Clinic: Return for labs and/or biopsy weekly the first month, every two weeks during month 2 and then monthly for the first year at the provider or coordinator's discretion. During the second year, return to clinic every 3 months. Post transplant year 3-5 return every 6 months. There will be a comprehensive post transplant evaluation every year that may include LHC/RHC/biopsy, stress test, echo, CXR, and other health screening exams.    In addition to the clinical assessment, I have ordered Allomap testing for this patient to assist in identification of moderate/severe acute cellular rejection (ACR) in a pt with stable Allograft function instead of endomyocardial biopsy.     Patient is reminded to call with any health changes as these can be early signs of transplant complications. Patient is advised to make sure any new medications or changes of old medications are discussed with a pharmacist or physician knowledgeable with transplant to avoid rejection/drug toxicity related to significant drug interactions.    UNOS Patient Status  Functional Status: 80% - Normal activity with effort: some symptoms of disease  Physical Capacity: Limited Mobility  Working for Income: No  If no, reason not  working: Unknown    Johny Zarate MD

## 2020-01-28 NOTE — H&P
Subjective:      Deborah Navas is a 49 y.o. female with a who needs biopsy for evaluation of cardiac rejection.  Currently able to lay flat.      Past Medical History:   Diagnosis Date    Fractures     History of ventricular fibrillation 9/4/2019    History of ventricular tachycardia 9/4/2019    Hyperlipidemia     Migraine     Multinodular goiter 9/5/2019    Osteoarthritis      Past Surgical History:   Procedure Laterality Date    BACK SURGERY      2007    BIOPSY WITH ULTRASOUND GUIDANCE N/A 12/31/2019    Procedure: BIOPSY, WITH US GUIDANCE;  Surgeon: Eric Antunez Jr., MD;  Location: [facility] CATH LAB;  Service: Cardiology;  Laterality: N/A;    BIOPSY WITH ULTRASOUND GUIDANCE N/A 1/7/2020    Procedure: BIOPSY, WITH US GUIDANCE;  Surgeon: Renetta Chaudhari MD;  Location: [facility] CATH LAB;  Service: Cardiology;  Laterality: N/A;    BIOPSY WITH ULTRASOUND GUIDANCE N/A 1/14/2020    Procedure: BIOPSY, WITH US GUIDANCE;  Surgeon: Renetta Chaudhari MD;  Location: [facility] CATH LAB;  Service: Cardiology;  Laterality: N/A;    BONE GRAFT Left     from Left hip to Left FA    eardrum reconstruction  1980    ELBOW SURGERY Left [id number]    FOREARM FRACTURE SURGERY Bilateral [id number]    multiple surgeries    HEART TRANSPLANT N/A 12/16/2019    Procedure: TRANSPLANT, HEART;  Surgeon: Talha Nuñez MD;  Location: [facility] OR [address] [place] [address];  Service: Cardiothoracic;  Laterality: N/A;    INSERTION OF GRAFT TO PERICARDIUM  9/10/2019    Procedure: INSERTION, GRAFT, PERICARDIUM;  Surgeon: Shar Walden MD;  Location: [facility] OR [address] [place] [address];  Service: Cardiovascular;;    INSERTION OF IMPLANTABLE CARDIOVERTER-DEFIBRILLATOR (ICD) GENERATOR WITH TWO EXISTING LEADS      INSERTION OF PACEMAKER Left     LEFT VENTRICULAR ASSIST DEVICE N/A 9/10/2019    Procedure: INSERTION-LEFT VENTRICULAR ASSIST DEVICE;  Surgeon: Sahr Walden MD;  [facility];  Service: Cardiovascular;  Laterality: N/A;  DT HM3     LUMBAR FUSION   2007    L4-L5    RIGHT HEART CATHETERIZATION Right 9/4/2019    Procedure: INSERTION, CATHETER, RIGHT HEART;  Surgeon: Vi Bryant MD;  Location: [facility] CATH LAB;  Service: Cardiology;  Laterality: Right;    RIGHT HEART CATHETERIZATION N/A 11/18/2019    Procedure: INSERTION, CATHETER, RIGHT HEART;  Surgeon: Eric Antunez Jr., MD;  Location: [facility] CATH LAB;  Service: Cardiology;  Laterality: N/A;    RIGHT HEART CATHETERIZATION Right 12/31/2019    Procedure: INSERTION, CATHETER, RIGHT HEART;  Surgeon: Eric Antunez Jr., MD;  Location: [facility] CATH LAB;  Service: Cardiology;  Laterality: Right;    RIGHT HEART CATHETERIZATION Right 1/7/2020    Procedure: INSERTION, CATHETER, RIGHT HEART;  Surgeon: Renetta Chaudhari MD;  Location: [facility] CATH LAB;  Service: Cardiology;  Laterality: Right;    SINUS SURGERY Right 1994    with lymph nodes    STERNAL WOUND CLOSURE  9/10/2019    Procedure: CLOSURE, WOUND, STERNUM;  Surgeon: Shar Walden MD;  Location: [facility] OR [address] [place] [address];  Service: Cardiovascular;;    TEMPOROMANDIBULAR JOINT SURGERY Right 1988    TONSILLECTOMY      TREATMENT OF CARDIAC ARRHYTHMIA N/A 9/24/2019    Procedure: CARDIOVERSION;  Surgeon: Moe Barrera MD;  Location: [facility] EP LAB;  Service: Cardiology;  Laterality: N/A;  AF, DCCV/NADINE, anes, DM, Rm 3073    TYMPANOSTOMY TUBE PLACEMENT  1971- 1979    multiple tube placements     Social History     Socioeconomic History    Marital status: Single     Spouse name: Not on file    Number of children: Not on file    Years of education: Not on file    Highest education level: Not on file   Occupational History    Not on file   Social Needs    Financial resource strain: Not on file    Food insecurity:     Worry: Not on file     Inability: Not on file    Transportation needs:     Medical: Not on file     Non-medical: Not on file   Tobacco Use    Smoking status: Never Smoker    Smokeless tobacco: Never Used   Substance and Sexual Activity    Alcohol  use: No    Drug use: No    Sexual activity: Not Currently   Lifestyle    Physical activity:     Days per week: Not on file     Minutes per session: Not on file    Stress: Not on file   Relationships    Social connections:     Talks on phone: Not on file     Gets together: Not on file     Attends Taoist service: Not on file     Active member of club or organization: Not on file     Attends meetings of clubs or organizations: Not on file     Relationship status: Not on file   Other Topics Concern    Not on file   Social History Narrative    Not on file     Family History   Problem Relation Age of Onset    Osteoarthritis Mother     Migraines Mother     Osteoarthritis Maternal Grandmother     Migraines Maternal Grandmother     Broken bones Maternal Grandmother     Osteoporosis Maternal Grandmother     Dislocations Maternal Grandmother     Scoliosis Maternal Grandmother     Osteoarthritis Paternal Grandmother     Cancer Paternal Grandmother         leukemia    Migraines Paternal Grandmother     Obesity Paternal Grandmother     Osteoarthritis Paternal Grandfather     Heart failure Paternal Grandfather     Migraines Paternal Grandfather     Heart failure Maternal Grandfather     Migraines Maternal Grandfather     Osteoarthritis Maternal Grandfather     Stroke Father     Osteoarthritis Father            Other significant clinical information:  Review of patient's allergies indicates:   Allergen Reactions    Adhesive Blisters     Reaction to area in chest and up only    Codeine Itching     No current facility-administered medications on file prior to encounter.      Current Outpatient Medications on File Prior to Encounter   Medication Sig    aspirin (ECOTRIN) 81 MG EC tablet Take 1 tablet (81 mg total) by mouth once daily.    atorvastatin (LIPITOR) 20 MG tablet Take 1 tablet (20 mg total) by mouth once daily.    blood sugar diagnostic Strp Use to test blood glucose 2 (two) times daily.     blood-glucose meter Misc USE AS DIRECTED    calcium carbonate-vitamin D3 1,000 mg(2,500 mg)-800 unit Tab Take 1 tablet by mouth once daily.    ferrous sulfate (FEOSOL) 325 mg (65 mg iron) Tab tablet Take 1 tablet (325 mg total) by mouth 2 (two) times daily.    gabapentin (NEURONTIN) 300 MG capsule Take 1 capsule (300 mg total) by mouth every evening.    lancets Misc Use to test blood glucose 2 (two) times daily.    magnesium oxide (MAG-OX) 400 mg (241.3 mg magnesium) tablet Take 1 tablet (400 mg total) by mouth once daily.    oxyCODONE (ROXICODONE) 10 mg Tab immediate release tablet Take 1 tablet (10 mg total) by mouth every 8 (eight) hours as needed.    senna-docusate 8.6-50 mg (PERICOLACE) 8.6-50 mg per tablet Take 2 tablets by mouth 2 (two) times daily as needed for Constipation.    SITagliptin (JANUVIA) 50 MG Tab Take 1 tablet (50 mg total) by mouth once daily.    venlafaxine (EFFEXOR-XR) 150 MG Cp24 Take 1 capsule (150 mg total) by mouth once daily.            Objective:      Physical Exam    LMP  (LMP Unknown)     General Appearance:    Alert, cooperative, no distress, appears stated age   Head:    Normocephalic, without obvious abnormality, atraumatic   Eyes:    PERRL, conjunctiva/corneas clear, EOM's intact, fundi     benign, both eyes   Ears:    Normal TM's and external ear canals, both ears   Nose:   Nares normal, septum midline, mucosa normal, no drainage    or sinus tenderness   Throat:   Lips, mucosa, and tongue normal; teeth and gums normal   Neck:   Supple, symmetrical, trachea midline, no adenopathy;     thyroid:  no enlargement/tenderness/nodules; no carotid    bruit or JVD   Back:     Symmetric, no curvature, ROM normal, no CVA tenderness   Lungs:     Clear to auscultation bilaterally, respirations unlabored   Chest Wall:    No tenderness or deformity    Heart:    Regular rate and rhythm, S1 and S2 normal, no murmur, rub   or gallop   Breast Exam:    No tenderness, masses, or nipple  abnormality   Abdomen:     Soft, non-tender, bowel sounds active all four quadrants,     no masses, no organomegaly   Genitalia:    Normal female without lesion, discharge or tenderness   Rectal:    Normal tone, no masses or tenderness;    guaiac negative stool   Extremities:   Extremities normal, atraumatic, no cyanosis or edema   Pulses:   2+ and symmetric all extremities   Skin:   Skin color, texture, turgor normal, no rashes or lesions   Lymph nodes:   Cervical, supraclavicular, and axillary nodes normal   Neurologic:   CNII-XII intact, normal strength, sensation and reflexes     throughout         Lab Review   Lab Results   Component Value Date    WBC 7.12 01/27/2020    HGB 8.0 (L) 01/27/2020    HCT 27.8 (L) 01/27/2020    MCV 92 01/27/2020     01/27/2020     Lab Results   Component Value Date    INR 1.2 12/23/2019    INR 1.4 (H) 12/22/2019    INR 1.3 (H) 12/21/2019           Assessment:       Plan:     I have explained the risks, benefits, and alternatives of the procedure in detail.  The patient expresses understanding and all questions have been answered.  The patient agrees to the proceed as planned.  RHC via RIJ   Micropuncture access needle will be used to minimize bleeding risk.

## 2020-01-28 NOTE — LETTER
January 28, 2020        Joaquin Del Toro  [address]  [address]  [place] [facility] [address]  Phone: [phone]  Fax: [phone]             [facility] [address]  [facility] [address]  Phone: [phone]   Patient: Deborah Navas   MR Number: [id number]   YOB: 1970   Date of Visit: 1/28/2020       Dear Dr. Joaquin Del Toro    Thank you for referring Deborah Navas to me for evaluation. Attached you will find relevant portions of my assessment and plan of care.    If you have questions, please do not hesitate to call me. I look forward to following Deborah Navas along with you.    Sincerely,    Johny Zarate MD    Enclosure    If you would like to receive this communication electronically, please contact [email or web address] or [phone] to request [employer] access.    [employer] is a tool which provides read-only access to select patient information with whom you have a relationship. Its easy to use and provides real time access to review your patients record including encounter summaries, notes, results, and demographic information.    If you feel you have received this communication in error or would no longer like to receive these types of communications, please e-mail [email or web address]

## 2020-01-29 ENCOUNTER — TELEPHONE (OUTPATIENT)
Dept: HOME HEALTH SERVICES | Facility: HOSPITAL | Age: 50
End: 2020-01-29

## 2020-01-29 LAB
FINAL PATHOLOGIC DIAGNOSIS: NORMAL
GROSS: NORMAL
MICROSCOPIC EXAM: NORMAL

## 2020-01-29 RX ORDER — PREDNISONE 5 MG/1
7.5 TABLET ORAL DAILY
Qty: 45 TABLET | Refills: 11 | Status: CANCELLED | OUTPATIENT
Start: 2020-01-29 | End: 2021-02-28

## 2020-01-29 NOTE — TELEPHONE ENCOUNTER
Left voicemail for pt regarding biopsy results which were negative for rejection. Instructed her to decrease prednisone to 7.5 mg daily. Noted that her sleep study and PT had been scheduled.

## 2020-01-30 ENCOUNTER — TELEPHONE (OUTPATIENT)
Dept: TRANSPLANT | Facility: CLINIC | Age: 50
End: 2020-01-30

## 2020-01-31 ENCOUNTER — CLINICAL SUPPORT (OUTPATIENT)
Dept: REHABILITATION | Facility: HOSPITAL | Age: 50
End: 2020-01-31
Payer: COMMERCIAL

## 2020-01-31 DIAGNOSIS — Z74.09 IMPAIRED FUNCTIONAL MOBILITY, BALANCE, AND ENDURANCE: ICD-10-CM

## 2020-01-31 DIAGNOSIS — R53.81 PHYSICAL DECONDITIONING: ICD-10-CM

## 2020-01-31 DIAGNOSIS — Z78.9 IMPAIRED INSTRUMENTAL ACTIVITIES OF DAILY LIVING (IADL): ICD-10-CM

## 2020-01-31 DIAGNOSIS — Z74.09 DECREASED FUNCTIONAL MOBILITY AND ENDURANCE: ICD-10-CM

## 2020-01-31 DIAGNOSIS — M62.81 MUSCLE WEAKNESS OF LOWER EXTREMITY: Primary | ICD-10-CM

## 2020-01-31 DIAGNOSIS — R29.898 POOR FINE MOTOR SKILLS: ICD-10-CM

## 2020-01-31 PROCEDURE — 97166 OT EVAL MOD COMPLEX 45 MIN: CPT | Mod: PO | Performed by: OPTOMETRIST

## 2020-01-31 PROCEDURE — 97530 THERAPEUTIC ACTIVITIES: CPT | Mod: PO | Performed by: OPTOMETRIST

## 2020-01-31 PROCEDURE — 97110 THERAPEUTIC EXERCISES: CPT | Mod: PO,CQ

## 2020-01-31 NOTE — PROGRESS NOTES
[facility] Occupational Therapy  Initial Neurological Evaluation     Date: 1/31/2020  Patient: Deborah Navas  Chart Number: [id number]    Therapy Diagnosis:   Encounter Diagnoses   Name Primary?    Poor fine motor skills     Impaired instrumental activities of daily living (IADL)     Decreased functional mobility and endurance      Physician: Americo Albert NP    Physician Orders: Evaluate and treat  Medical Diagnosis: Heart transplant, 12/16/19  Evaluation Date: 1/31/2020  Plan of Care Expiration Period: 4/3/2020  Insurance Authorization period Expiration: 12/31/2020  Date of Return to MD: 2/11/2020 Elliot (Heart transplant)   Visit # / Visits Authorized: 1 / future visits pending    Time In:1100  Time Out: 1145  Total Billable (one on one) Time: 45 minutes    Precautions: Standard, Immunosuppression and organ transplant    Subjective     History of Current Condition: Pt underwent placement of LVAD on 9/10/2019 and then three month later she underwent heart transplant surgery on 12/16/2019 -  She was hospitalized for two weeks and then D/C to [facility] and received  to home for HH PT but then readmitted to hospital after 2 weeks. Upon D/C she again received where she received HH PT and OT then readmitted for one week again and DC on 1/28/2020. Since the surgery there has a been a reduction of strength and endurance. Says she becomes SOB very quickly and feels that after only 3 months post her LVAD placement she was not fully recovered before her heart transplant. She was told by MD after further work-up that these symptoms are not heart or lung related but just decompensation.     Involved Side: Bilateral   Dominant Side: Right  Date of Onset: 9/2019 after LVAD placement  Surgical Procedure: Multiple cardiac procedures since 9/2019 and heart transplant on 12/16/19  Previous Therapy: HH PT and OT , Acute PT and OT     Patient's Goals for Therapy:  To have better endurance in  standing and to improve her FM skills, pinch and  strength that she needs for functional tasks.     Pain:  Pain Related Behaviors Observed: no     Occupation:  Full time  Working presently: employed at [employer] and her job is being held for her.  Duties:  with use of computer for most of the day     Functional Limitations/Social History:    Prior Level of Function: Independent   Current Level of Function:Needs assist with IADLs     Home/Living environment : lives with their family, Sister   Home Access: Currently at [email or web address] where her sister is assisting her   ( Lives in 1 story home with no ZURI in [place])   DME: rollator, portable O2 (used at night)      Leisure: Needlework/Sewing and reading     Driving: Hasn't driven in over a year but wants to return when she can.     Past Medical History/Physical Systems Review:     Past Medical History:  Deborah Navas  has a past medical history of Fractures, History of ventricular fibrillation, History of ventricular tachycardia, Hyperlipidemia, Migraine, Multinodular goiter, Osteoarthritis, and Restrictive cardiomyopathy post heart transplant.    Past Surgical History:  Deborah Navas  has a past surgical history that includes Tympanostomy tube placement (1971- 1979); Tonsillectomy; eardrum reconstruction (1980); Temporomandibular joint surgery (Right, 1988); Sinus surgery (Right, 1994); Forearm fracture surgery (Bilateral, 5489-8954); Elbow surgery (Left, 8981-5516); Lumbar fusion (2007); Back surgery; Bone graft (Left); Insertion of pacemaker (Left); Insertion of implantable cardioverter-defibrillator (ICD) generator with two existing leads; Right heart catheterization (Right, 9/4/2019); Left ventricular assist device (N/A, 9/10/2019); Insertion of graft to pericardium (9/10/2019); Sternal wound closure (9/10/2019); Treatment of cardiac arrhythmia (N/A, 9/24/2019); Right heart catheterization (N/A, 11/18/2019); Heart  transplant (N/A, 12/16/2019); Biopsy with ultrasound guidance (N/A, 12/31/2019); Right heart catheterization (Right, 12/31/2019); Biopsy with ultrasound guidance (N/A, 1/7/2020); Right heart catheterization (Right, 1/7/2020); Biopsy with ultrasound guidance (N/A, 1/14/2020); and Biopsy with ultrasound guidance (N/A, 1/28/2020).    Current Medications:  Deborah has a current medication list which includes the following prescription(s): amlodipine, aspirin, atorvastatin, calcium carbonate-vitamin d3, dapsone, ferrous sulfate, gabapentin, magnesium oxide, mycophenolate, opw transplant care kit, oxycodone, pantoprazole, prednisone, senna-docusate 8.6-50 mg, sitagliptin, tacrolimus, terbutaline, valganciclovir, venlafaxine, and zolpidem.    Allergies:  Review of patient's allergies indicates:   Allergen Reactions    Adhesive Blisters     Reaction to area in chest and up only    Codeine Itching        Objective     Cognitive Exam:  Oriented: Person, Place, Time and Situation  Behaviors: normal, cooperative  Follows Commands/attention: Follows multistep  commands  Communication: clear/fluent  Memory: No Deficits noted as determined by 3 word recall after 1 minute and 3 minutes  Safety awareness/insight to disability: aware of diagnosis, treatment, and prognosis  Coping skills/emotional control: Appropriate to situation    Visual/Perceptual:  Tracking: intact  Saccades: intact  Acuity: intact but wears reading glasses   R/L discrimination: intact  Visual field: intact  Motor Planning Praxis: intact but control limited by tremors    Physical Exam:  Postural examination/scapula alignment: Rounded shoulder  Joint integrity: Firm end feeling  Skin integrity: Warm, dry    Joint Evaluation   AROM is WFL's in B UE's    Fist: loose    Strength  WFL's throughout B UE's with exception of hand as follow.        Strength: (FRENCH Dynamometer in lbs.) Average 3 trials, Position II:     1/31/2020 1/31/2020    Left Right   Rung II  33# 40#     Pinch Strength (Measured in psi)     1/31/2020 1/31/2020    Left Right   Key Pinch 1 psi 4 psi   3pt Pinch 1 psi 0 psi   2pt Pinch 0 psi 0 psi     Fine Motor Coordination: 9 Hole Peg Test  Left 1/31/2020 Right 1/31/2020      58 sec  61 sec     Gross motor coordination:   - KOMAL (Rapid Alternating Movements): slower with tremors   - Finger to Nose (5 times): slow with tremors bilaterally  - Finger Flicks (coordination moving from digit flexion to digit extension): Normal mild tremors     Tone:  Modified Eliane Scale:   0 - No increase in muscle tone    Comments: Pt's main complaint with her hand function is the tremors that she experiences with movement that compromises her Feeding and Grooming skills as well as her ability to  and take her pill medications. Hand weakness is also a limiting factor.    Sensation:  Deborah  reports tips of all digits seem to have less sensation (noted when picking up pills)   Light touch: bilateral impaired  Sharp/Dull: bilateral intact  Kinesthesia: bilateral intact  Proprioception: bilateral intact  Temperature: bilateral intact    Balance:   Static Sit - GOOD+: Takes MAXIMAL challenges from all directions.    Dynamic sit- GOOD: Takes MODERATE challenges from all directions  Static Stand - GOOD: Takes MODERATE challenges from all directions  Dynamic stand - FAIR+: Able to take MINIMAL challenges from all directions    Endurance Deficit: moderate, as Pt is able to ambulate with out a device but fatigues easily and needs sitting rest breaks.                     Functional Status      Functional Mobility:  Bed mobility: I  Roll to left: I  Roll to right: I  Supine to sit: Mod I for increased time   Sit to supine: I  Transfers to bed: Mod I  Transfers to toilet: I  Car transfers: Mod I, needs more effort and increased time     ADL's:  Feeding: Mod I, needs to do slower due to tremors   Grooming: Mod I, due to tremors  Hygiene: Mod I  UB Dressing: Mod I  LB Dressing:  Mod I  Toileting: I  Bathing: Mod I, mostly sponge baths     IADL's:  Homecare: D  Cooking: D  Laundry: D  Yard work: D  Use of telephone: Mod I, tremors limit use  Money management: I  Medication management: Mod I  Handwriting:Mod A  Technology Use:D     Comments: Pt uses a computer at her job at a consistent basis and when she has recently tried to type on her keyboard she has great difficulty where her L hand could not perform at all.       CMS Impairment/Limitation/Restriction for FOTO Complications + Unspecified Injuries Survey  Status Limitation G-Code CMS Severity Modifier  Intake 40% 60% Current Status CL - At least 60 percent but less than 80 percent  Predicted 57% 43% Goal Status+ CK - At least 40 percent but less than 60 percent  D/C Status CL **only report if this is a one time visit  +Based on FOTO predicted change score         Treatment     Treatment Time In: 1130  Treatment Time Out: 1145  Total Treatment time separate from Evaluation time:15 minutes    Deborah participated in dynamic functional therapeutic activities to improve functional performance for 15 minutes, including:    Theraputty activities with Yellow putty for various pinch and  activities as Pt was issued putty and handout to work from at home.    Home Exercises and Patient Education Provided    Education provided:   -role of OT, goals for OT, scheduling/cancellations, insurance limitations with patient.  -Additional Education provided: Theraputty as above    Written Home Exercises Provided: yes.  Exercises were reviewed and Deborah was able to demonstrate them prior to the end of the session.    Deborah demonstrated good  understanding of the education provided.       Assessment     Deborah Navas is a 49 y.o. female referred to outpatient occupational therapy and presents with a medical diagnosis of Heart transplant, resulting in decreased muscle strength, impaired function and decreased work ability and  demonstrates limitations as described in the chart below. Following medical record review it is determined that pt will benefit from occupational therapy services in order to maximize pain free and/or functional use of bilateral hands and all digits. Pt also has overall decreased functional endurance.     Pt prognosis is Good due to Pt being motivated to improve and return to her work.   Pt will benefit from skilled outpatient Occupational Therapy to address the deficits stated above and in the chart below, provide pt/family education, and to maximize pt's level of independence.     Plan of care discussed with patient: Yes  Pt's spiritual, cultural and educational needs considered and patient is agreeable to the plan of care and goals as stated below:     Anticipated Barriers for therapy: Multiple follow up appointments with transplant team.     Medical Necessity is demonstrated by the following  Profile and History Assessment of Occupational Performance Level of Clinical Decision Making Complexity Score   Occupational Profile:   Deborah Navas is a 49 y.o. female who lives with their family and is currently employed as a . Deborah Navas has difficulty with  feeding and grooming  driving/transportation management, phone/computer use and housework/household chores  affecting his/her daily functional abilities. His/her main goal for therapy is improve her functional FM skills, hand strength and overall functional endurance.     Co morbidities:   immunosuppression and multiple cardiac issues in past year    Medical and Therapy History Review:   Expanded               Performance Deficits    Physical:  Control of Voluntary Movement   Strength  Pinch Strength  Fine Motor Coordination  Tactile Functions    Cognitive:  No Deficits    Psychosocial:    Routines  Wishes to return to work     Clinical Decision Making:  moderate    Assessment Process:  Problem-Focused  Assessments    Modification/Need for Assistance:  Minimal-Moderate Modifications/Assistance    Intervention Selection:  Limited Treatment Options       moderate  Based on PMHX, co morbidities , data from assessments and functional level of assistance required with task and clinical presentation directly impacting function.       The following goals were discussed with the patient and patient is in agreement with them as to be addressed in the treatment plan.     Goals:  Short Term Goals: 3 weeks   ROM/Strength to perform the ADL's and functional activities listed below,    Pt will improve functional  strength needed for tasks to 35# in L hand.  Pt will improve functional  strength needed for tasks to 44# in R hand.  Pt will improve functional lateral pinch strength needed for tasks to 3# in L hand.  Pt will improve functional lateral pinch strength needed for tasks to 6# in R hand.  Pt will be Independent with HEP to improve strength and FM skills.      Long Term Goals: 6 weeks   ROM/Strength to perform the ADL's and functional activities listed below  Pt will improve functional  strength needed for tasks to 40# in L hand.  Pt will improve functional  strength needed for tasks to 48# in R hand.  Pt will improve functional lateral pinch strength needed for tasks to 4# in L hand.  Pt will improve functional lateral pinch strength needed for tasks to 8# in R hand.  Pt will perform functional standing activity for 15 minutes without sitting rest break.   G code self care CK.       Plan   Certification Period/Plan of care expiration: 1/31/2020 to 4/3/2020.    Outpatient Occupational Therapy 2 times weekly for 6 weeks to include the following interventions: Patient Education, Self Care, Therapeutic Activites and Therapeutic Exercise.    JAGJIT Ayala      I certify the need for these services furnished under this plan of treatment and while under my care.  ____________________________________  Physician/Referring Practitioner   Date of Signature

## 2020-01-31 NOTE — PLAN OF CARE
[facility]  Physical Therapy Initial Evaluation    Name: Deborah Navas  Clinic Number: [id number]    Therapy Diagnosis:   Encounter Diagnoses   Name Primary?    Muscle weakness of lower extremity     Impaired functional mobility, balance, and endurance     Physical deconditioning      Physician: Americo Albert NP    Physician Orders: PT Eval and Treat deconditioned / weakness  Medical Diagnosis from Referral:   Z94.1 (ICD-10-CM) - Heart transplanted    Evaluation Date: 1/28/2020  Authorization Period Expiration: 1/21/2021  Plan of Care Expiration: 4/28/2020  Visit # / Visits authorized: 1/ 1    Time In: 1615  Time Out: 1715  Total Time: 60 minutes    Precautions: Standard / fall     Subjective       Medical History:   Past Medical History:   Diagnosis Date    Fractures     History of ventricular fibrillation 9/4/2019    History of ventricular tachycardia 9/4/2019    Hyperlipidemia     Migraine     Multinodular goiter 9/5/2019    Osteoarthritis     Restrictive cardiomyopathy post heart transplant        Surgical History:   Deborah Nvaas  has a past surgical history that includes Tympanostomy tube placement (1971- 1979); Tonsillectomy; eardrum reconstruction (1980); Temporomandibular joint surgery (Right, 1988); Sinus surgery (Right, 1994); Forearm fracture surgery (Bilateral, 0824-2199); Elbow surgery (Left, 2505-3489); Lumbar fusion (2007); Back surgery; Bone graft (Left); Insertion of pacemaker (Left); Insertion of implantable cardioverter-defibrillator (ICD) generator with two existing leads; Right heart catheterization (Right, 9/4/2019); Left ventricular assist device (N/A, 9/10/2019); Insertion of graft to pericardium (9/10/2019); Sternal wound closure (9/10/2019); Treatment of cardiac arrhythmia (N/A, 9/24/2019); Right heart catheterization (N/A, 11/18/2019); Heart transplant (N/A, 12/16/2019); Biopsy with ultrasound guidance (N/A, 12/31/2019);  "Right heart catheterization (Right, 12/31/2019); Biopsy with ultrasound guidance (N/A, 1/7/2020); Right heart catheterization (Right, 1/7/2020); Biopsy with ultrasound guidance (N/A, 1/14/2020); and Biopsy with ultrasound guidance (N/A, 1/28/2020).    Medications:   Deborah has a current medication list which includes the following prescription(s): amlodipine, aspirin, atorvastatin, calcium carbonate-vitamin d3, dapsone, ferrous sulfate, gabapentin, magnesium oxide, mycophenolate, opw transplant care kit, oxycodone, pantoprazole, prednisone, senna-docusate 8.6-50 mg, sitagliptin, tacrolimus, terbutaline, valganciclovir, venlafaxine, and zolpidem.    Allergies:   Review of patient's allergies indicates:   Allergen Reactions    Adhesive Blisters     Reaction to area in chest and up only    Codeine Itching           History of current condition - Deborah reports: weakness with decreased endurance and feeling of "jelly legs"   Date of onset: 12/16/2019 - underwent heart transplant surgery. Hospitalized for two weeks. DC to home for HH PT but then readmitted for one week. HH PT x2 then readmitted for one week again and DC yesterday   Says since the surgery there has a been a reduction of strength and endurance. Says she becomes SOB very quickly. She was told by MD after further work-up that these symptoms are not heart or lung related but just decompensation.       Pain:  Current 0/10, worst 0/10, best 0/10     Aggravating Factors: Walking     Current Level of Function:  Falls - x1 stepping off uneven sidewalk one week ago.   Standing - not sure but maybe 3 min    Walking - 75% compensated -walk about 3 min   Stairs - NA   Personal - showering, can stand for 10 min to shower but leans at wall.   Domestic - not making bed, not doing laundry, can sweep one room, only, no other ADL   Community / social - not engaging at this time   Occupation - employed but not working at present time   Recreation / fitness / sports  " - did not participate before surgery.   Prior Therapy: home health after DC only x2 then readmitted to hospital   Social History: single  lives with an adult , friend, who serves as care giver in an apartment. No stairs   Occupation:    Prior Level of Function: compromised. Recovering from LVAD surgery prior to recent surgery.   Pts goals: to feel safe walking and be able to do so for longer than 3 minutes.   Imaging, X-Rays : 1/20/2010 chest             Objective     Physical Appearance: patient is not demonstrating outward distress or dyspnea.       ROM:  UPPER EXTREMITY    AROM/PROM  (R) UE: WFLs shoulder, elbow, wrist   (L) UE: WFLs shoulder, elbow, wrist      Shoulder R L    MMT MMT   flexion 3+/5 3+/5   extension 3+/5 3+/5   abduction 3+/5 3+/5   Internal rotation 4-/5 4-/5   ER at 90° abd NT NT   ER at 0° abd 3+/5 3+/5          ROM--  LOWER EXTREMITIES    AROM/PROM   (R) LE: WFLs hip, knee, ankle  Hamstrings Length: 70 degrees  Ankle Dorsiflexion:   20 degrees   (L) LE: WFLs hip, knee, ankle   Hamstrings Length: 70 degrees  Ankle Dorsiflexion: 20 degrees    Strength  Right LE  Left LE    Knee extension: 3+/5 Knee extension: 3+/5   Knee flexion: 4-/5 Knee flexion: 4-/5   Hip flexion: 3+/5 Hip flexion: 3+/5   Hip extension:  2+/5 Hip extension: 2+/5   Hip abduction: 3-/5 Hip abduction: 3-/5   Ankle dorsiflexion: 4-/5 Ankle dorsiflexion: 4-/5   Ankle plantarflexion 3/5 Ankle plantarflexion  3/5   Ankle inversion  4-/5 Ankle inversion  4-/5   Ankle eversion: 3/5 Ankle eversion  3/5     Gait: Without AD  Analysis: ambulates with a Trendelenburg on the left.   Step length and height :        Right swing foot:  Passes left stance foot =1 and Completely clears floor with step =1  and Left swing foot:  Passes right stance foot= 1 and Completely clears floor with step =1  12. Step symmetry         Right & Left step length appear equal = 1  13. Step continuity :        Steps appear continuous =  "1  14. Path :        Straight without walking aid = 1  15. Trunk :          No sway, but flexion of knees or back or spreads arm out while walking = 1  16. Walking stance width          Heels apart = 0    Bed Mobility:Independent  Transfers: Independent  Special Tests:  Sit to stand = requires use of hands  Standing unsupported - 3' w/o support, no sway   Sit - 5' no compensations   Get up and Go - without compromise but hesitation   Turn 360 - smooth and safe   Pivot and turn - without instability or hesitation   Single leg stand - maintains 10" eyes open stable surface  Floor  - performs with a squat and without signs of instability           CMS Impairment/Limitation/Restriction for FOTO  Survey not performed        Education provided:   -discussed approach to therapy and monitoring of BP, O2 sats. [place]     Assessment     Deborah is a 49 y.o. female referred to outpatient Physical Therapy with a medical diagnosis of heart transplant. Pt presents with clinical findings of deconditioning, LE weakness, decreased muscle endurance and stability challenges.   Pt prognosis is Fair.   Pt will benefit from skilled outpatient Physical Therapy to address the deficits stated above and in the chart below, provide pt/family education, and to maximize pt's level of independence.     Plan of care discussed with patient: Yes  Pt's spiritual, cultural and educational needs considered and patient is agreeable to the plan of care and goals as stated below:     Anticipated Barriers for therapy: tolerance level     Medical Necessity is demonstrated by the following  History  Co-morbidities and personal factors that may impact the plan of care Co-morbidities:   prior lumbar surgery and hx of heart disease     Personal Factors:   coping style  attitudes     Moderate    Examination  Body Structures and Functions, activity limitations and participation restrictions that may impact the plan of care Body Regions:   lower " extremities  upper extremities  trunk  aerobic level     Body Systems:    gross symmetry  strength  balance  gait  heart rate  respiratory rate  blood pressure  aerobic level    Participation Restrictions: cardiorespiratory challenges     Activity limitations:   Learning and applying knowledge  no deficits    General Tasks and Commands  no deficits    Communication  no deficits    Mobility  lifting and carrying objects  walking  driving (bike, car, motorcycle)    Self care  washing oneself (bathing, drying, washing hands)    Domestic Life  shopping  cooking  doing house work (cleaning house, washing dishes, laundry)    Interactions/Relationships  no deficits    Life Areas  no deficits    Community and Social Life  community life  recreation and leisure         moderate   Clinical Presentation evolving clinical presentation with changing clinical characteristics moderate   Decision Making/ Complexity Score: moderate       Short Term Goals (5 Weeks):      1.Pt to increase BLE strength by a 1/2 grade of muscles test to allow for improvement in gait and stability  2.Pt to improve her ability to rise to stand with minimal use of her hands for improved functional mobility  3.Pt to report compliance with HEP and demonstrate proper exercise technique to PT to show competence with self management of condition  4.Pt to tolerate therapy session and and perform exercise reps at 30 or greater BUE / BLE not  demonstrating dyspnea or significant fatigue to improve overall tolerance for daily function and mobility       Long Term Goals ( 10 Weeks):      1. Pt to demonstrate get up and go without hesitation and ambulate without deviation for improved gait pattern  2. Pt to increase walking intervals by 50 to 75% to demonstrate improved tolerance and functional mobility  3. Pt will be independent with home exercise program for continued management of her condition   4. Pt to demonstrate dynamic unilateral standing stability on  unstable surface for improved quality of movement   5. Pt to report improved endurance demonstrated by ambulation and exercise tolerance to increased functional domestic ADL and personal management         Plan     Plan of care Certification: 1/28/2020 to 4/28/2020.    Outpatient Physical Therapy 2 times weekly for 10 weeks to include the following interventions: Gait Training, Neuromuscular Re-ed, Patient Education and Therapeutic Exercise.     Javier Maldonado, PT      Therapist: Javier Maldonado, PT

## 2020-01-31 NOTE — PROGRESS NOTES
Physical Therapy Daily Treatment Note     Name: Deborah Oliva [facility] Number: [id number]    Therapy Diagnosis: No diagnosis found.  Physician: No ref. provider found    Visit Date: 1/31/2020    Physician Orders: PT Eval and Treat deconditioned / weakness  Medical Diagnosis from Referral:   Z94.1 (ICD-10-CM) - Heart transplanted     Evaluation Date: 1/28/2020  Authorization Period Expiration: 1/21/2021  Plan of Care Expiration: 4/28/2020  Visit # / Visits authorized: 1/ 0 (plus 1 visit for the initial evaluation)    Time In: 0910  Time Out: 1015  Total Billable Time: 65 minutes    Precautions: Standard and Fall    Subjective     Pt reports: general weakness in legs and SOB which increases with activity.  She did not receive HEP at time of initial evaluation.    Response to previous treatment: no adverse effects  Functional change: ongoing    Pain: 0/10  Location: N/A    Objective     Deborah received therapeutic exercises to develop strength, endurance, ROM, flexibility, posture and core stabilization for 65 minutes including:    Recumbent bike L3 for 5 min    Supine:  PPT with TA bracing 15x with   Hooklying marching with PPT and TA bracing 15x  Bridges 3x6 with 3 sec holds  Clamshells with orange TB: 3x10 reps   SLR 2x6 reps    Sitting:  Pallof press with orange TB: 2x10 reps     Standing:   Glut dominant squats with UE support 2x5 reps  Heel raises 2x10 reps with 3 sec holds  gastroc stretch 2x30 sec  Toe raises 2x10 reps with 3 sec holds    UBE L1 for 5 min      Home Exercises Provided and Patient Education Provided     Education provided: Pt was educated on HEP and all therapeutic exercises initiated during today's tx visit.       Written Home Exercises Provided: Patient instructed to cont prior HEP.  Exercises were reviewed and Deborah was able to demonstrate them prior to the end of the session.  Deborah demonstrated good  understanding of the education provided.     See EMR under Patient  Instructions for exercises provided 1/31/2020.    Assessment   Pt tolerated tx well but did demonstrate SOB and fatigue throughout tx visit. Tx focused heavily on core hips/BLE strengthening and conditioning. HEP was initiated in order to promote core stabilization, hip/BLE strengthening, endurance, and ankle stability. Will focus on core and BUE exercises during the next tx visit. Pt's pulse and blood oxygen levels were monitored pre, during and post tx visit. It was noted that pt's oxygen level was between 91% and 99% oxygen levels. Pt's pulse was between 92 and 104 bpm. Will progress as tolerated.        Deborah is progressing well towards her goals.   Pt prognosis is Fair.     Pt will continue to benefit from skilled outpatient physical therapy to address the deficits listed in the problem list box on initial evaluation, provide pt/family education and to maximize pt's level of independence in the home and community environment.     Pt's spiritual, cultural and educational needs considered and pt agreeable to plan of care and goals.     Anticipated barriers to physical therapy:tolerance level     Short Term Goals (6 Weeks):      1.Pt to increase strength by a 1/2 grade of muscles test to allow for improvement in functional activities such as performing chores.  2.Pt to improve range of motion by 25% to allow for improved functional mobility to allow for improvement in IA DLS.   3.Pt to report compliance with HEP and demonstrate proper exercise technique to PT to show competence with self management of condition.  4.Decrease pain by 25% during functional activities.     Long Term Goals (12 Weeks):      1. Increase ROM to allow improved joint biomechanics during functional activities.   2.Increase trunk and lower extremity strength to within normal limits during functional activities.   3. Independent with home exercise program.   4. Full return to functional activities with manageable complaints.  5. Patient  to demonstrate improved posture and body mechanics.  6. Decrease pain by 75% during functional activities.    Plan   Continue POC established by PT, including: BLE/BUE strengthening, general conditioning, and improve safety with functional activities the require single leg stability.       Aldo Fuentes, PTA

## 2020-02-01 NOTE — PLAN OF CARE
[facility] Occupational Therapy  Initial Neurological Evaluation     Date: 1/31/2020  Patient: Deborah Navas  Chart Number: [id number]    Therapy Diagnosis:   Encounter Diagnoses   Name Primary?    Poor fine motor skills     Impaired instrumental activities of daily living (IADL)     Decreased functional mobility and endurance      Physician: Americo Albert NP    Physician Orders: Evaluate and treat  Medical Diagnosis: Heart transplant, 12/16/19  Evaluation Date: 1/31/2020  Plan of Care Expiration Period: 4/3/2020  Insurance Authorization period Expiration: 12/31/2020  Date of Return to MD: 2/11/2020 Elliot (Heart transplant)   Visit # / Visits Authorized: 1 / future visits pending    Time In:1100  Time Out: 1145  Total Billable (one on one) Time: 45 minutes    Precautions: Standard, Immunosuppression and organ transplant    Subjective     History of Current Condition: Pt underwent placement of LVAD on 9/10/2019 and then three month later she underwent heart transplant surgery on 12/16/2019 -  She was hospitalized for two weeks and then D/C to [facility] and received  to home for HH PT but then readmitted to hospital after 2 weeks. Upon D/C she again received where she received HH PT and OT then readmitted for one week again and DC on 1/28/2020. Since the surgery there has a been a reduction of strength and endurance. Says she becomes SOB very quickly and feels that after only 3 months post her LVAD placement she was not fully recovered before her heart transplant. She was told by MD after further work-up that these symptoms are not heart or lung related but just decompensation.     Involved Side: Bilateral   Dominant Side: Right  Date of Onset: 9/2019 after LVAD placement  Surgical Procedure: Multiple cardiac procedures since 9/2019 and heart transplant on 12/16/19  Previous Therapy: HH PT and OT , Acute PT and OT     Patient's Goals for Therapy:  To have better endurance in  standing and to improve her FM skills, pinch and  strength that she needs for functional tasks.     Pain:  Pain Related Behaviors Observed: no     Occupation:  Full time  Working presently: employed at [employer] and her job is being held for her.  Duties:  with use of computer for most of the day     Functional Limitations/Social History:    Prior Level of Function: Independent   Current Level of Function:Needs assist with IADLs     Home/Living environment : lives with their family, Sister   Home Access: Currently at [employer] where her sister is assisting her   ( Lives in 1 story home with no ZURI in [place])   DME: rollator, portable O2 (used at night)      Leisure: Needlework/Sewing and reading     Driving: Hasn't driven in over a year but wants to return when she can.     Past Medical History/Physical Systems Review:     Past Medical History:  Deborah Navas  has a past medical history of Fractures, History of ventricular fibrillation, History of ventricular tachycardia, Hyperlipidemia, Migraine, Multinodular goiter, Osteoarthritis, and Restrictive cardiomyopathy post heart transplant.    Past Surgical History:  Deborah Navas  has a past surgical history that includes Tympanostomy tube placement (1971- 1979); Tonsillectomy; eardrum reconstruction (1980); Temporomandibular joint surgery (Right, 1988); Sinus surgery (Right, 1994); Forearm fracture surgery (Bilateral, 8279-2576); Elbow surgery (Left, 6318-7747); Lumbar fusion (2007); Back surgery; Bone graft (Left); Insertion of pacemaker (Left); Insertion of implantable cardioverter-defibrillator (ICD) generator with two existing leads; Right heart catheterization (Right, 9/4/2019); Left ventricular assist device (N/A, 9/10/2019); Insertion of graft to pericardium (9/10/2019); Sternal wound closure (9/10/2019); Treatment of cardiac arrhythmia (N/A, 9/24/2019); Right heart catheterization (N/A, 11/18/2019); Heart  transplant (N/A, 12/16/2019); Biopsy with ultrasound guidance (N/A, 12/31/2019); Right heart catheterization (Right, 12/31/2019); Biopsy with ultrasound guidance (N/A, 1/7/2020); Right heart catheterization (Right, 1/7/2020); Biopsy with ultrasound guidance (N/A, 1/14/2020); and Biopsy with ultrasound guidance (N/A, 1/28/2020).    Current Medications:  Deborah has a current medication list which includes the following prescription(s): amlodipine, aspirin, atorvastatin, calcium carbonate-vitamin d3, dapsone, ferrous sulfate, gabapentin, magnesium oxide, mycophenolate, opw transplant care kit, oxycodone, pantoprazole, prednisone, senna-docusate 8.6-50 mg, sitagliptin, tacrolimus, terbutaline, valganciclovir, venlafaxine, and zolpidem.    Allergies:  Review of patient's allergies indicates:   Allergen Reactions    Adhesive Blisters     Reaction to area in chest and up only    Codeine Itching        Objective     Cognitive Exam:  Oriented: Person, Place, Time and Situation  Behaviors: normal, cooperative  Follows Commands/attention: Follows multistep  commands  Communication: clear/fluent  Memory: No Deficits noted as determined by 3 word recall after 1 minute and 3 minutes  Safety awareness/insight to disability: aware of diagnosis, treatment, and prognosis  Coping skills/emotional control: Appropriate to situation    Visual/Perceptual:  Tracking: intact  Saccades: intact  Acuity: intact but wears reading glasses   R/L discrimination: intact  Visual field: intact  Motor Planning Praxis: intact but control limited by tremors    Physical Exam:  Postural examination/scapula alignment: Rounded shoulder  Joint integrity: Firm end feeling  Skin integrity: Warm, dry    Joint Evaluation   AROM is WFL's in B UE's    Fist: loose    Strength  WFL's throughout B UE's with exception of hand as follow.        Strength: (FRENCH Dynamometer in lbs.) Average 3 trials, Position II:     1/31/2020 1/31/2020    Left Right   Rung II  33# 40#     Pinch Strength (Measured in psi)     1/31/2020 1/31/2020    Left Right   Key Pinch 1 psi 4 psi   3pt Pinch 1 psi 0 psi   2pt Pinch 0 psi 0 psi     Fine Motor Coordination: 9 Hole Peg Test  Left 1/31/2020 Right 1/31/2020      58 sec  61 sec     Gross motor coordination:   - KOMAL (Rapid Alternating Movements): slower with tremors   - Finger to Nose (5 times): slow with tremors bilaterally  - Finger Flicks (coordination moving from digit flexion to digit extension): Normal mild tremors     Tone:  Modified Eliane Scale:   0 - No increase in muscle tone    Comments: Pt's main complaint with her hand function is the tremors that she experiences with movement that compromises her Feeding and Grooming skills as well as her ability to  and take her pill medications. Hand weakness is also a limiting factor.    Sensation:  Deborah  reports tips of all digits seem to have less sensation (noted when picking up pills)   Light touch: bilateral impaired  Sharp/Dull: bilateral intact  Kinesthesia: bilateral intact  Proprioception: bilateral intact  Temperature: bilateral intact    Balance:   Static Sit - GOOD+: Takes MAXIMAL challenges from all directions.    Dynamic sit- GOOD: Takes MODERATE challenges from all directions  Static Stand - GOOD: Takes MODERATE challenges from all directions  Dynamic stand - FAIR+: Able to take MINIMAL challenges from all directions    Endurance Deficit: moderate, as Pt is able to ambulate with out a device but fatigues easily and needs sitting rest breaks.                     Functional Status      Functional Mobility:  Bed mobility: I  Roll to left: I  Roll to right: I  Supine to sit: Mod I for increased time   Sit to supine: I  Transfers to bed: Mod I  Transfers to toilet: I  Car transfers: Mod I, needs more effort and increased time     ADL's:  Feeding: Mod I, needs to do slower due to tremors   Grooming: Mod I, due to tremors  Hygiene: Mod I  UB Dressing: Mod I  LB Dressing:  Mod I  Toileting: I  Bathing: Mod I, mostly sponge baths     IADL's:  Homecare: D  Cooking: D  Laundry: D  Yard work: D  Use of telephone: Mod I, tremors limit use  Money management: I  Medication management: Mod I  Handwriting:Mod A  Technology Use:D     Comments: Pt uses a computer at her job at a consistent basis and when she has recently tried to type on her keyboard she has great difficulty where her L hand could not perform at all.       CMS Impairment/Limitation/Restriction for FOTO Complications + Unspecified Injuries Survey  Status Limitation G-Code CMS Severity Modifier  Intake 40% 60% Current Status CL - At least 60 percent but less than 80 percent  Predicted 57% 43% Goal Status+ CK - At least 40 percent but less than 60 percent  D/C Status CL **only report if this is a one time visit  +Based on FOTO predicted change score         Treatment     Treatment Time In: 1130  Treatment Time Out: 1145  Total Treatment time separate from Evaluation time:15 minutes    Deborah participated in dynamic functional therapeutic activities to improve functional performance for 15 minutes, including:    Theraputty activities with Yellow putty for various pinch and  activities as Pt was issued putty and handout to work from at home.    Home Exercises and Patient Education Provided    Education provided:   -role of OT, goals for OT, scheduling/cancellations, insurance limitations with patient.  -Additional Education provided: Theraputty as above    Written Home Exercises Provided: yes.  Exercises were reviewed and Deborah was able to demonstrate them prior to the end of the session.    Deborah demonstrated good  understanding of the education provided.       Assessment     Deborah Navas is a 49 y.o. female referred to outpatient occupational therapy and presents with a medical diagnosis of Heart transplant, resulting in decreased muscle strength, impaired function and decreased work ability and  demonstrates limitations as described in the chart below. Following medical record review it is determined that pt will benefit from occupational therapy services in order to maximize pain free and/or functional use of bilateral hands and all digits. Pt also has overall decreased functional endurance.     Pt prognosis is Good due to Pt being motivated to improve and return to her work.   Pt will benefit from skilled outpatient Occupational Therapy to address the deficits stated above and in the chart below, provide pt/family education, and to maximize pt's level of independence.     Plan of care discussed with patient: Yes  Pt's spiritual, cultural and educational needs considered and patient is agreeable to the plan of care and goals as stated below:     Anticipated Barriers for therapy: Multiple follow up appointments with transplant team.     Medical Necessity is demonstrated by the following  Profile and History Assessment of Occupational Performance Level of Clinical Decision Making Complexity Score   Occupational Profile:   Deborah Navas is a 49 y.o. female who lives with their family and is currently employed as a . Deborah Navas has difficulty with  feeding and grooming  driving/transportation management, phone/computer use and housework/household chores  affecting his/her daily functional abilities. His/her main goal for therapy is improve her functional FM skills, hand strength and overall functional endurance.     Co morbidities:   immunosuppression and multiple cardiac issues in past year    Medical and Therapy History Review:   Expanded               Performance Deficits    Physical:  Control of Voluntary Movement   Strength  Pinch Strength  Fine Motor Coordination  Tactile Functions    Cognitive:  No Deficits    Psychosocial:    Routines  Wishes to return to work     Clinical Decision Making:  moderate    Assessment Process:  Problem-Focused  Assessments    Modification/Need for Assistance:  Minimal-Moderate Modifications/Assistance    Intervention Selection:  Limited Treatment Options       moderate  Based on PMHX, co morbidities , data from assessments and functional level of assistance required with task and clinical presentation directly impacting function.       The following goals were discussed with the patient and patient is in agreement with them as to be addressed in the treatment plan.     Goals:  Short Term Goals: 3 weeks   ROM/Strength to perform the ADL's and functional activities listed below,    Pt will improve functional  strength needed for tasks to 35# in L hand.  Pt will improve functional  strength needed for tasks to 44# in R hand.  Pt will improve functional lateral pinch strength needed for tasks to 3# in L hand.  Pt will improve functional lateral pinch strength needed for tasks to 6# in R hand.  Pt will be Independent with HEP to improve strength and FM skills.      Long Term Goals: 6 weeks   ROM/Strength to perform the ADL's and functional activities listed below  Pt will improve functional  strength needed for tasks to 40# in L hand.  Pt will improve functional  strength needed for tasks to 48# in R hand.  Pt will improve functional lateral pinch strength needed for tasks to 4# in L hand.  Pt will improve functional lateral pinch strength needed for tasks to 8# in R hand.  Pt will perform functional standing activity for 15 minutes without sitting rest break.   G code self care CK.       Plan   Certification Period/Plan of care expiration: 1/31/2020 to 4/3/2020.    Outpatient Occupational Therapy 2 times weekly for 6 weeks to include the following interventions: Patient Education, Self Care, Therapeutic Activites and Therapeutic Exercise.    JAGJIT Ayala      I certify the need for these services furnished under this plan of treatment and while under my care.  ____________________________________  Physician/Referring Practitioner   Date of Signature

## 2020-02-07 ENCOUNTER — CLINICAL SUPPORT (OUTPATIENT)
Dept: REHABILITATION | Facility: HOSPITAL | Age: 50
End: 2020-02-07
Payer: COMMERCIAL

## 2020-02-07 DIAGNOSIS — Z74.09 IMPAIRED FUNCTIONAL MOBILITY, BALANCE, AND ENDURANCE: ICD-10-CM

## 2020-02-07 DIAGNOSIS — R53.81 PHYSICAL DECONDITIONING: ICD-10-CM

## 2020-02-07 DIAGNOSIS — M62.81 MUSCLE WEAKNESS OF LOWER EXTREMITY: Primary | ICD-10-CM

## 2020-02-07 PROCEDURE — 97110 THERAPEUTIC EXERCISES: CPT | Mod: PO,CQ

## 2020-02-07 NOTE — PROGRESS NOTES
Physical Therapy Daily Treatment Note     Name: Deborah Oliva [facility] Number: [id number]    Therapy Diagnosis:   Encounter Diagnoses   Name Primary?    Muscle weakness of lower extremity Yes    Impaired functional mobility, balance, and endurance     Physical deconditioning      Physician: Americo Albert NP    Visit Date: 2/7/2020    Physician Orders: PT Eval and Treat deconditioned / weakness  Medical Diagnosis from Referral:   Z94.1 (ICD-10-CM) - Heart transplanted     Evaluation Date: 1/28/2020  Authorization Period Expiration: 1/21/2021  Plan of Care Expiration: 4/28/2020  Visit # / Visits authorized: 1/ 20 (plus 2 visit for the initial evaluation and first tx visit)    Time In: 0900  Time Out: 1005  Total Billable Time: 65 minutes    Precautions: Standard and Fall    Subjective     Pt reports: that she walked around [employer] and few other stores 2 days this past week. She also reported fatigue after doing this.    She was partially compliant with HEP.  Response to previous treatment: no adverse effects  Functional change: ongoing    Pain: 0/10  Location: N/A    Objective     Deborah received therapeutic exercises to develop strength, endurance, ROM, flexibility, posture and core stabilization for 65 minutes including:    Pulse and O2 levels at pre, during and post tx session  95 pulse/98 O2 level pre  88 pulse/94 O2 level during  97 pulse/94 O2 level during  98 pulse/94 O2 level post      UBE L1 for 6 min    Supine:  PPT with TA bracing 15x with   Hooklying marching with PPT and TA bracing 3x30 sec  Bridges 3x8 with 3 sec holds  SLR 2x8 reps     Side-lying:   Clamshells with orange TB: 3x10 reps     Sitting:  Pallof press with orange TB: 2x12 reps   Incline bench press with 4 lbs dumb bells   Bicep curls with 4 lbs dumb bells       Standing:   Glut dominant squats with UE support 2x7 reps  Step ups with 4 inch step 2x10 reps   Heel raises 2x15 reps with 3 sec holds  gastroc stretch 2x30  sec  Toe raises 2x15 reps with 3 sec holds  Rows with green TB: 2x10 reps  Shoulder extensions with orange TB: 2x10 reps    Recumbent bike L3 for 5 min      Home Exercises Provided and Patient Education Provided     Education provided: Pt was educated on HEP and all therapeutic exercises initiated during today's tx visit.       Written Home Exercises Provided: Patient instructed to cont prior HEP.  Exercises were reviewed and Deborah was able to demonstrate them prior to the end of the session.  Deborah demonstrated good  understanding of the education provided.     See EMR under Patient Instructions for exercises provided 1/31/2020.    Assessment   Pt tolerated tx well but did demonstrate SOB and fatigue throughout tx visit. Tx focused heavily on core hips/BLE strengthening and conditioning. BUE exercises were initiated in order to promote danie-scapular stabilization and caridovascular endurance. Pt's pulse and blood oxygen levels were monitored pre, during and post tx visit. It was noted that pt's oxygen level was between 94% and 98% oxygen levels. Pt's pulse was between 88 and 98 bpm. Pt was instructed to perform HEP on a regular basis. Will progress as tolerated.        Deborah is progressing well towards her goals.   Pt prognosis is Fair.     Pt will continue to benefit from skilled outpatient physical therapy to address the deficits listed in the problem list box on initial evaluation, provide pt/family education and to maximize pt's level of independence in the home and community environment.     Pt's spiritual, cultural and educational needs considered and pt agreeable to plan of care and goals.     Anticipated barriers to physical therapy:tolerance level     Short Term Goals (6 Weeks):      1.Pt to increase strength by a 1/2 grade of muscles test to allow for improvement in functional activities such as performing chores.  2.Pt to improve range of motion by 25% to allow for improved functional  mobility to allow for improvement in IA DLS.   3.Pt to report compliance with HEP and demonstrate proper exercise technique to PT to show competence with self management of condition.  4.Decrease pain by 25% during functional activities.     Long Term Goals (12 Weeks):      1. Increase ROM to allow improved joint biomechanics during functional activities.   2.Increase trunk and lower extremity strength to within normal limits during functional activities.   3. Independent with home exercise program.   4. Full return to functional activities with manageable complaints.  5. Patient to demonstrate improved posture and body mechanics.  6. Decrease pain by 75% during functional activities.    Plan   Continue POC established by PT, including: BLE/BUE strengthening, general conditioning, and improve safety with functional activities the require single leg stability.       Aldo Fuentes, PTA

## 2020-02-11 ENCOUNTER — OFFICE VISIT (OUTPATIENT)
Dept: TRANSPLANT | Facility: CLINIC | Age: 50
End: 2020-02-11
Payer: COMMERCIAL

## 2020-02-11 ENCOUNTER — LAB VISIT (OUTPATIENT)
Dept: LAB | Facility: HOSPITAL | Age: 50
End: 2020-02-11
Attending: INTERNAL MEDICINE
Payer: COMMERCIAL

## 2020-02-11 ENCOUNTER — TELEPHONE (OUTPATIENT)
Dept: CARDIOTHORACIC SURGERY | Facility: CLINIC | Age: 50
End: 2020-02-11

## 2020-02-11 ENCOUNTER — DOCUMENTATION ONLY (OUTPATIENT)
Dept: CARDIOLOGY | Facility: CLINIC | Age: 50
End: 2020-02-11

## 2020-02-11 ENCOUNTER — INITIAL CONSULT (OUTPATIENT)
Dept: CARDIOLOGY | Facility: CLINIC | Age: 50
End: 2020-02-11
Payer: COMMERCIAL

## 2020-02-11 ENCOUNTER — TELEPHONE (OUTPATIENT)
Dept: TRANSPLANT | Facility: CLINIC | Age: 50
End: 2020-02-11

## 2020-02-11 ENCOUNTER — HOSPITAL ENCOUNTER (OUTPATIENT)
Facility: HOSPITAL | Age: 50
Discharge: HOME | End: 2020-02-11
Attending: INTERNAL MEDICINE | Admitting: INTERNAL MEDICINE
Payer: COMMERCIAL

## 2020-02-11 VITALS
BODY MASS INDEX: 33.94 KG/M2 | HEIGHT: 67 IN | SYSTOLIC BLOOD PRESSURE: 110 MMHG | WEIGHT: 216.25 LBS | WEIGHT: 215.5 LBS | DIASTOLIC BLOOD PRESSURE: 64 MMHG | DIASTOLIC BLOOD PRESSURE: 70 MMHG | BODY MASS INDEX: 33.82 KG/M2 | HEIGHT: 67 IN | HEART RATE: 89 BPM | HEART RATE: 92 BPM | SYSTOLIC BLOOD PRESSURE: 122 MMHG

## 2020-02-11 VITALS
HEART RATE: 88 BPM | DIASTOLIC BLOOD PRESSURE: 57 MMHG | BODY MASS INDEX: 34.29 KG/M2 | HEIGHT: 67 IN | SYSTOLIC BLOOD PRESSURE: 107 MMHG | WEIGHT: 218.5 LBS | OXYGEN SATURATION: 93 %

## 2020-02-11 DIAGNOSIS — Z94.1 HEART TRANSPLANTED: Primary | ICD-10-CM

## 2020-02-11 DIAGNOSIS — Z94.1 STATUS POST HEART TRANSPLANT: ICD-10-CM

## 2020-02-11 DIAGNOSIS — R53.81 PHYSICAL DECONDITIONING: ICD-10-CM

## 2020-02-11 DIAGNOSIS — T86.298 OTHER COMPLICATION OF HEART TRANSPLANT: ICD-10-CM

## 2020-02-11 DIAGNOSIS — Z94.1 S/P ORTHOTOPIC HEART TRANSPLANT: ICD-10-CM

## 2020-02-11 DIAGNOSIS — Z79.899 ENCOUNTER FOR LONG-TERM (CURRENT) USE OF MEDICATIONS: ICD-10-CM

## 2020-02-11 DIAGNOSIS — N28.9 RENAL INSUFFICIENCY: ICD-10-CM

## 2020-02-11 DIAGNOSIS — R25.1 TREMOR: ICD-10-CM

## 2020-02-11 DIAGNOSIS — D86.85 CARDIAC SARCOIDOSIS: ICD-10-CM

## 2020-02-11 DIAGNOSIS — T38.0X5S ADVERSE EFFECT OF ADRENAL CORTICAL STEROIDS, SEQUELA: ICD-10-CM

## 2020-02-11 DIAGNOSIS — R18.8 GROIN FLUID COLLECTION: Primary | ICD-10-CM

## 2020-02-11 DIAGNOSIS — D84.9 IMMUNOSUPPRESSION: ICD-10-CM

## 2020-02-11 DIAGNOSIS — N18.4 STAGE 4 CHRONIC KIDNEY DISEASE: ICD-10-CM

## 2020-02-11 DIAGNOSIS — D84.9 IMMUNOSUPPRESSION: Primary | ICD-10-CM

## 2020-02-11 LAB
ALBUMIN SERPL BCP-MCNC: 3.9 G/DL (ref 3.5–5.2)
ALP SERPL-CCNC: 69 U/L (ref 55–135)
ALT SERPL W/O P-5'-P-CCNC: 10 U/L (ref 10–44)
ANION GAP SERPL CALC-SCNC: 14 MMOL/L (ref 8–16)
ASCENDING AORTA: 2.37 CM
AST SERPL-CCNC: 8 U/L (ref 10–40)
AV INDEX (PROSTH): 0.86
AV MEAN GRADIENT: 5 MMHG
AV PEAK GRADIENT: 11 MMHG
AV VALVE AREA: 2.6 CM2
AV VELOCITY RATIO: 0.72
BASOPHILS # BLD AUTO: 0.07 K/UL (ref 0–0.2)
BASOPHILS NFR BLD: 1.1 % (ref 0–1.9)
BILIRUB SERPL-MCNC: 0.6 MG/DL (ref 0.1–1)
BNP SERPL-MCNC: 431 PG/ML (ref 0–99)
BSA FOR ECHO PROCEDURE: 2.15 M2
BUN SERPL-MCNC: 21 MG/DL (ref 6–20)
CALCIUM SERPL-MCNC: 9.7 MG/DL (ref 8.7–10.5)
CHLORIDE SERPL-SCNC: 106 MMOL/L (ref 95–110)
CO2 SERPL-SCNC: 22 MMOL/L (ref 23–29)
CREAT SERPL-MCNC: 2.3 MG/DL (ref 0.5–1.4)
CV ECHO LV RWT: 0.35 CM
DIFFERENTIAL METHOD: ABNORMAL
DOP CALC AO PEAK VEL: 1.64 M/S
DOP CALC AO VTI: 28.72 CM
DOP CALC LVOT AREA: 3 CM2
DOP CALC LVOT DIAMETER: 1.96 CM
DOP CALC LVOT PEAK VEL: 1.18 M/S
DOP CALC LVOT STROKE VOLUME: 74.67 CM3
DOP CALCLVOT PEAK VEL VTI: 24.76 CM
E WAVE DECELERATION TIME: 204.23 MSEC
E/A RATIO: 1.9
E/E' RATIO: 11.8 M/S
ECHO LV POSTERIOR WALL: 0.8 CM (ref 0.6–1.1)
EOSINOPHIL # BLD AUTO: 0.2 K/UL (ref 0–0.5)
EOSINOPHIL NFR BLD: 3.1 % (ref 0–8)
ERYTHROCYTE [DISTWIDTH] IN BLOOD BY AUTOMATED COUNT: 18 % (ref 11.5–14.5)
EST. GFR  (AFRICAN AMERICAN): 27.7 ML/MIN/1.73 M^2
EST. GFR  (NON AFRICAN AMERICAN): 24.1 ML/MIN/1.73 M^2
FRACTIONAL SHORTENING: 43 % (ref 28–44)
GLUCOSE SERPL-MCNC: 87 MG/DL (ref 70–110)
HCT VFR BLD AUTO: 30.2 % (ref 37–48.5)
HGB BLD-MCNC: 8.5 G/DL (ref 12–16)
IMM GRANULOCYTES # BLD AUTO: 0.04 K/UL (ref 0–0.04)
IMM GRANULOCYTES NFR BLD AUTO: 0.6 % (ref 0–0.5)
INTERVENTRICULAR SEPTUM: 0.81 CM (ref 0.6–1.1)
LEFT INTERNAL DIMENSION IN SYSTOLE: 2.59 CM (ref 2.1–4)
LEFT VENTRICLE DIASTOLIC VOLUME INDEX: 45.65 ML/M2
LEFT VENTRICLE DIASTOLIC VOLUME: 95.29 ML
LEFT VENTRICLE MASS INDEX: 56 G/M2
LEFT VENTRICLE SYSTOLIC VOLUME INDEX: 11.7 ML/M2
LEFT VENTRICLE SYSTOLIC VOLUME: 24.36 ML
LEFT VENTRICULAR INTERNAL DIMENSION IN DIASTOLE: 4.56 CM (ref 3.5–6)
LEFT VENTRICULAR MASS: 117.14 G
LV LATERAL E/E' RATIO: 10.73 M/S
LV SEPTAL E/E' RATIO: 13.11 M/S
LYMPHOCYTES # BLD AUTO: 0.4 K/UL (ref 1–4.8)
LYMPHOCYTES NFR BLD: 6.3 % (ref 18–48)
MAGNESIUM SERPL-MCNC: 1.9 MG/DL (ref 1.6–2.6)
MCH RBC QN AUTO: 25.8 PG (ref 27–31)
MCHC RBC AUTO-ENTMCNC: 28.1 G/DL (ref 32–36)
MCV RBC AUTO: 92 FL (ref 82–98)
MONOCYTES # BLD AUTO: 0.3 K/UL (ref 0.3–1)
MONOCYTES NFR BLD: 5.1 % (ref 4–15)
MV PEAK A VEL: 0.62 M/S
MV PEAK E VEL: 1.18 M/S
NEUTROPHILS # BLD AUTO: 5.2 K/UL (ref 1.8–7.7)
NEUTROPHILS NFR BLD: 83.8 % (ref 38–73)
NRBC BLD-RTO: 0 /100 WBC
PISA TR MAX VEL: 2.83 M/S
PLATELET # BLD AUTO: 386 K/UL (ref 150–350)
PMV BLD AUTO: 8.9 FL (ref 9.2–12.9)
POTASSIUM SERPL-SCNC: 4.4 MMOL/L (ref 3.5–5.1)
PROT SERPL-MCNC: 6.8 G/DL (ref 6–8.4)
RA PRESSURE: 3 MMHG
RBC # BLD AUTO: 3.29 M/UL (ref 4–5.4)
RIGHT VENTRICULAR END-DIASTOLIC DIMENSION: 4.09 CM
RV TISSUE DOPPLER FREE WALL SYSTOLIC VELOCITY 1 (APICAL 4 CHAMBER VIEW): 7.43 CM/S
SINUS: 2.48 CM
SODIUM SERPL-SCNC: 142 MMOL/L (ref 136–145)
STJ: 2.01 CM
TACROLIMUS BLD-MCNC: 12.4 NG/ML (ref 5–15)
TDI LATERAL: 0.11 M/S
TDI SEPTAL: 0.09 M/S
TDI: 0.1 M/S
TR MAX PG: 32 MMHG
TRICUSPID ANNULAR PLANE SYSTOLIC EXCURSION: 1.9 CM
TV REST PULMONARY ARTERY PRESSURE: 35 MMHG
WBC # BLD AUTO: 6.22 K/UL (ref 3.9–12.7)

## 2020-02-11 PROCEDURE — 80197 ASSAY OF TACROLIMUS: CPT

## 2020-02-11 PROCEDURE — 99999 PR PBB SHADOW E&M-EST. PATIENT-LVL IV: ICD-10-PCS | Mod: PBBFAC,,, | Performed by: INTERNAL MEDICINE

## 2020-02-11 PROCEDURE — 99999 PR PBB SHADOW E&M-EST. PATIENT-LVL IV: CPT | Mod: PBBFAC,,, | Performed by: INTERNAL MEDICINE

## 2020-02-11 PROCEDURE — 3074F PR MOST RECENT SYSTOLIC BLOOD PRESSURE < 130 MM HG: ICD-10-PCS | Mod: CPTII,S$GLB,, | Performed by: INTERNAL MEDICINE

## 2020-02-11 PROCEDURE — 99205 PR OFFICE/OUTPT VISIT, NEW, LEVL V, 60-74 MIN: ICD-10-PCS | Mod: S$GLB,,, | Performed by: INTERNAL MEDICINE

## 2020-02-11 PROCEDURE — 93505 PR BIOPSY OF HEART LINING: ICD-10-PCS | Mod: 26,,, | Performed by: INTERNAL MEDICINE

## 2020-02-11 PROCEDURE — 99215 OFFICE O/P EST HI 40 MIN: CPT | Mod: 25,S$GLB,, | Performed by: INTERNAL MEDICINE

## 2020-02-11 PROCEDURE — 3078F DIAST BP <80 MM HG: CPT | Mod: CPTII,S$GLB,, | Performed by: INTERNAL MEDICINE

## 2020-02-11 PROCEDURE — 88307 TISSUE EXAM BY PATHOLOGIST: CPT | Performed by: PATHOLOGY

## 2020-02-11 PROCEDURE — 99999 PR PBB SHADOW E&M-EST. PATIENT-LVL III: CPT | Mod: PBBFAC,,, | Performed by: INTERNAL MEDICINE

## 2020-02-11 PROCEDURE — 3008F PR BODY MASS INDEX (BMI) DOCUMENTED: ICD-10-PCS | Mod: CPTII,S$GLB,, | Performed by: INTERNAL MEDICINE

## 2020-02-11 PROCEDURE — 88342 CHG IMMUNOCYTOCHEMISTRY: ICD-10-PCS | Mod: 26,,, | Performed by: PATHOLOGY

## 2020-02-11 PROCEDURE — 3074F SYST BP LT 130 MM HG: CPT | Mod: CPTII,S$GLB,, | Performed by: INTERNAL MEDICINE

## 2020-02-11 PROCEDURE — 99999 PR PBB SHADOW E&M-EST. PATIENT-LVL III: ICD-10-PCS | Mod: PBBFAC,,, | Performed by: INTERNAL MEDICINE

## 2020-02-11 PROCEDURE — 99205 OFFICE O/P NEW HI 60 MIN: CPT | Mod: S$GLB,,, | Performed by: INTERNAL MEDICINE

## 2020-02-11 PROCEDURE — 3008F BODY MASS INDEX DOCD: CPT | Mod: CPTII,S$GLB,, | Performed by: INTERNAL MEDICINE

## 2020-02-11 PROCEDURE — 3078F PR MOST RECENT DIASTOLIC BLOOD PRESSURE < 80 MM HG: ICD-10-PCS | Mod: CPTII,S$GLB,, | Performed by: INTERNAL MEDICINE

## 2020-02-11 PROCEDURE — 36415 COLL VENOUS BLD VENIPUNCTURE: CPT

## 2020-02-11 PROCEDURE — 85025 COMPLETE CBC W/AUTO DIFF WBC: CPT

## 2020-02-11 PROCEDURE — C1894 INTRO/SHEATH, NON-LASER: HCPCS | Performed by: INTERNAL MEDICINE

## 2020-02-11 PROCEDURE — 83880 ASSAY OF NATRIURETIC PEPTIDE: CPT

## 2020-02-11 PROCEDURE — 93505 ENDOMYOCARDIAL BIOPSY: CPT | Performed by: INTERNAL MEDICINE

## 2020-02-11 PROCEDURE — 88342 IMHCHEM/IMCYTCHM 1ST ANTB: CPT | Performed by: PATHOLOGY

## 2020-02-11 PROCEDURE — 99215 PR OFFICE/OUTPT VISIT, EST, LEVL V, 40-54 MIN: ICD-10-PCS | Mod: 25,S$GLB,, | Performed by: INTERNAL MEDICINE

## 2020-02-11 PROCEDURE — 83735 ASSAY OF MAGNESIUM: CPT

## 2020-02-11 PROCEDURE — 88307 TISSUE EXAM BY PATHOLOGIST: CPT | Mod: 26,,, | Performed by: PATHOLOGY

## 2020-02-11 PROCEDURE — 93505 ENDOMYOCARDIAL BIOPSY: CPT | Mod: 26,,, | Performed by: INTERNAL MEDICINE

## 2020-02-11 PROCEDURE — 88307 PR  SURG PATH,LEVEL V: ICD-10-PCS | Mod: 26,,, | Performed by: PATHOLOGY

## 2020-02-11 PROCEDURE — 25000003 PHARM REV CODE 250: Performed by: INTERNAL MEDICINE

## 2020-02-11 PROCEDURE — 27201423 OPTIME MED/SURG SUP & DEVICES STERILE SUPPLY: Performed by: INTERNAL MEDICINE

## 2020-02-11 PROCEDURE — 80053 COMPREHEN METABOLIC PANEL: CPT

## 2020-02-11 PROCEDURE — 88342 IMHCHEM/IMCYTCHM 1ST ANTB: CPT | Mod: 26,,, | Performed by: PATHOLOGY

## 2020-02-11 RX ORDER — DIPHENHYDRAMINE HCL 25 MG
50 CAPSULE ORAL ONCE
Status: CANCELLED | OUTPATIENT
Start: 2020-02-11 | End: 2020-02-11

## 2020-02-11 RX ORDER — SODIUM CHLORIDE 0.9 G/100ML
IRRIGANT IRRIGATION
Status: DISCONTINUED | OUTPATIENT
Start: 2020-02-11 | End: 2020-02-11 | Stop reason: HOSPADM

## 2020-02-11 RX ORDER — SODIUM CHLORIDE 9 MG/ML
3 INJECTION, SOLUTION INTRAVENOUS CONTINUOUS
Status: CANCELLED | OUTPATIENT
Start: 2020-02-11 | End: 2020-02-11

## 2020-02-11 RX ORDER — LIDOCAINE HYDROCHLORIDE 20 MG/ML
INJECTION, SOLUTION INFILTRATION; PERINEURAL
Status: DISCONTINUED | OUTPATIENT
Start: 2020-02-11 | End: 2020-02-11 | Stop reason: HOSPADM

## 2020-02-11 NOTE — TELEPHONE ENCOUNTER
I received a call from Bernadette Pastrana, in Dr. Zarate's office, who informed me that the pt saw Dr. Zarate today and he would like Dr. Nuñez to evaluate pt's right groin, s/p heart transplant, for a possible lymphocele.  Dr. Nuñez was notified of this and he ordered an US to be done the same day that he sees her.  Pt called and notified of this and she was scheduled to have the US and to see Dr. Nuñez on February 13.  Pt verbalized understanding.

## 2020-02-11 NOTE — LETTER
February 11, 2020        Joaquin Del Toro  [address] [facility]  [address]  [facility] [address]  Phone: [phone]  Fax: [phone]             [facility] [address]  [facility] [address]  Phone: [phone]   Patient: Deborah Navas   MR Number: [id number]   YOB: 1970   Date of Visit: 2/11/2020       Dear Dr. Joaquin Del Toro    Thank you for referring Deborah Navas to me for evaluation. Attached you will find relevant portions of my assessment and plan of care.    If you have questions, please do not hesitate to call me. I look forward to following Deborah Navas along with you.    Sincerely,    Johny Zarate MD    Enclosure    If you would like to receive this communication electronically, please contact [email or web address] or [phone] to request [employer] access.    [employer] Link is a tool which provides read-only access to select patient information with whom you have a relationship. Its easy to use and provides real time access to review your patients record including encounter summaries, notes, results, and demographic information.    If you feel you have received this communication in error or would no longer like to receive these types of communications, please e-mail [email or web address]

## 2020-02-11 NOTE — PROGRESS NOTES
Subjective:   Ms. Navas is a 49 y.o. year old White female who received a donation after brain death heart transplant on 12/16/19.      CMV status:   Donor: +  Recipient: -    HPI     Recently discharged from the hospital after heart transplantation. Renal faliure and tremors are issues. Hospital follow up (see below)    Doing better after hospitalization. Tremors are very disabling. She is on dapsone    Nausea is better./ Creatinine 2.3 mg/dl    Prograf 3 mg bid, Cell Cept 500 mg bid, Prednisone 7.5 mg    Tacrolimus level 12.1     Lymphocele      HPI: 50yo F s/p OHTx on 12/16/2019 for NICM/ HFrEF with HM3 on 9/2019, removed at time of transplant, HLD, obesity, and OA, who presented for worsening nausea and continued dyspnea on exertion. The pathology of native heart revealed non-caseating granulomas typical of sarcoidosis. Was recently admitted with similar symptoms/GLO in which she was diuresed and BP medications adjusted with improvement in renal function.  RHC on 1/7 with RA 12, wedge 15 and high CO/CI. TTE revealed LVEF 65% and normal RV function, mild MR, mod-sev TR, IVC not seen; no pericardial effusion appreciated. She also had significant tremors on admission and theophylline levels were found to be severely elevated so it was discontinued, with some improvement. EMB from 12/31 and 1/7 were both negative for Ab or cell-mediated rejection. She states that she did OK when initially discharged but has been getting progressively more nauseous over the last few days. Denies any sick contacts. No diarrhea. Vomiting occasionally but mostly dry heaves. She also still complains of dyspnea with minimal exertion which has not imrpoved since surgery.        Hospital Course: Pt was admitted and gently diuresed with IVP Lasix. In addition, MMF dose was decreased and Bactrim was stopped. Her nausea improved rapidly and she continued to work with PT/OT. She was transitioned to terbutaline from theophylline which she  tolerated well. Her dyspnea improved as well throughout stay. She was given dose of pentamidine with plans to start dapsone as an outpt. She was discharged home in good condition with plans to f/u with biopsy tomorrow.         January 28 2020    · HARSH Bx Study.  · Normal left ventricular systolic function. The estimated ejection fraction is 63%.  · Septal wall has abnormal motion.  · Indeterminate left ventricular diastolic function.  · Mild right ventricular enlargement.  · Low normal right ventricular systolic function.  · Mild mitral regurgitation.  · Mild to moderate tricuspid regurgitation.  · Mild pulmonic regurgitation.  · Intermediate central venous pressure (8 mmHg).  · The estimated PA systolic pressure is 44 mmHg.  · Pulmonary hypertension present      · Post cardiac transplantation study - OHTx 12/16/19  · Normal left ventricular systolic function. The estimated ejection fraction is 65%.  · Normal right ventricular systolic function.  · Normal LV diastolic function.  · Mild tricuspid regurgitation.  · The estimated PA systolic pressure is 35 mmHg.  · Normal central venous pressure (3 mmHg).  · No evident complications following endomyocardial biopsy.      Hospital Course: She was started on IV diuretic and low dobutamine due to concern for RV failure. Repeat RHC on 1/7 with upper normal filling pressures (RA 12, wedge 15) and high CO/CI. TTE revealed LVEF 65% and normal RV function, mild MR, mod-sev TR, IVC not seen; no pericardial effusion appreciated. She was switched to PO furosemide and her renal function improved slightly (last creat 2.3, from 3.2). She also had significant tremors on admission and theophylline levels were found to be severely elevated so it was discontinued, with some improvement. Tacrolimus dose was adjusted according to levels for a goal of 8, last level today is 5.6. BP was above goal and she was started on amlodipine and hydralazine with good response. EMB from 12/31 and 1/7  were both negative for Ab or cell-mediated rejection. There was also concern for increasing size of R groin hematoma that was first noted post-transplant, CTS evaluated patient and recommended conservative management and observation. Patient is currently stable to be discharged home and follow-up as outpatient; she has an appointment for her next EMB on 1/14/2020          0 Review of Systems   Constitution: Negative for chills, decreased appetite, diaphoresis, fever, malaise/fatigue, night sweats, weight gain and weight loss.   Cardiovascular: Negative for chest pain, claudication, cyanosis, dyspnea on exertion, irregular heartbeat, leg swelling, near-syncope, orthopnea and palpitations.   Respiratory: Negative for cough, hemoptysis, shortness of breath, sleep disturbances due to breathing, snoring, sputum production and wheezing.    Gastrointestinal: Negative for abdominal pain and diarrhea.   Neurological: Negative for weakness.       Objective:   There were no vitals taken for this visit.body mass index is unknown because there is no height or weight on file.    Physical Exam   Constitutional: She is oriented to person, place, and time. She appears well-developed and well-nourished.   HENT:   Head: Normocephalic and atraumatic.   Eyes: Pupils are equal, round, and reactive to light. Conjunctivae and EOM are normal.   Neck: Neck supple. No JVD present. No tracheal deviation present. No thyromegaly present.   Cardiovascular: Normal rate, regular rhythm and normal heart sounds. Exam reveals no gallop and no friction rub.   No murmur heard.  Pulmonary/Chest: Effort normal and breath sounds normal. No respiratory distress. She has no wheezes. She has no rales. She exhibits no tenderness.   Abdominal: Soft. Bowel sounds are normal. She exhibits no distension and no mass. There is no tenderness. There is no rebound and no guarding.   Musculoskeletal: Normal range of motion. She exhibits no edema or tenderness.    Lymphadenopathy:     She has no cervical adenopathy.   Neurological: She is alert and oriented to person, place, and time. She has normal reflexes. No cranial nerve deficit. She exhibits normal muscle tone. Coordination normal.   Skin: Skin is warm and dry.   Psychiatric: She has a normal mood and affect. Her behavior is normal. Thought content normal.       Lab Results   Component Value Date    WBC 6.22 02/11/2020    HGB 8.5 (L) 02/11/2020    HCT 30.2 (L) 02/11/2020    MCV 92 02/11/2020     (H) 02/11/2020    CO2 22 (L) 02/11/2020    CREATININE 2.3 (H) 02/11/2020    CALCIUM 9.7 02/11/2020    ALBUMIN 3.9 02/11/2020    AST 8 (L) 02/11/2020     (H) 02/11/2020    ALT 10 02/11/2020       Lab Results   Component Value Date    INR 1.2 12/23/2019    INR 1.4 (H) 12/22/2019    INR 1.3 (H) 12/21/2019       BNP   Date Value Ref Range Status   02/11/2020 431 (H) 0 - 99 pg/mL Final     Comment:     Values of less than 100 pg/ml are consistent with non-CHF populations.   01/28/2020 336 (H) 0 - 99 pg/mL Final     Comment:     Values of less than 100 pg/ml are consistent with non-CHF populations.   01/25/2020 308 (H) 0 - 99 pg/mL Final     Comment:     Values of less than 100 pg/ml are consistent with non-CHF populations.       LD   Date Value Ref Range Status   12/16/2019 240 110 - 260 U/L Final     Comment:     Results are increased in hemolyzed samples.   12/15/2019 246 110 - 260 U/L Final     Comment:     Results are increased in hemolyzed samples.   12/14/2019 257 110 - 260 U/L Final     Comment:     Results are increased in hemolyzed samples.       Tacrolimus Lvl   Date Value Ref Range Status   01/28/2020 9.1 5.0 - 15.0 ng/mL Final     Comment:     Testing performed by Liquid Chromatography-Tandem  Mass Spectrometry (LC-MS/MS).  This test was developed and its performance characteristics  determined by [facility], Department of Pathology  and Laboratory Medicine in a manner consistent with  CLIA  requirements. It has not been cleared or approved by the [place]  Food and Drug Administration.  This test is used for clinical   purposes.  It should not be regarded as investigational or for  research.       No results found for: SIROLIMUS  No results found for: CYCLOSPORINE    No results found for this or any previous visit.  No results found for this or any previous visit.    Labs were reviewed with the patient.    Assessment:     1. Immunosuppression    2. 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 Adverse effect of adrenal cortical steroids, sequela        Plan:   Routine follow up. Progressing well Tremor are an issue. Decrease prograf 2/3      Sleep study soon    Hold terbutaline    CV surgery consult regarding lymphocele    Return instructions as set forth by post transplant schedule or as needed:    Clinic: Return for labs and/or biopsy weekly the first month, every two weeks during month 2 and then monthly for the first year at the provider or coordinator's discretion. During the second year, return to clinic every 3 months. Post transplant year 3-5 return every 6 months. There will be a comprehensive post transplant evaluation every year that may include LHC/RHC/biopsy, stress test, echo, CXR, and other health screening exams.    In addition to the clinical assessment, I have ordered Allomap testing for this patient to assist in identification of moderate/severe acute cellular rejection (ACR) in a pt with stable Allograft function instead of endomyocardial biopsy.     Patient is reminded to call with any health changes as these can be early signs of transplant complications. Patient is advised to make sure any new medications or changes of old medications are discussed with a pharmacist or physician knowledgeable with transplant to avoid rejection/drug toxicity related to significant drug interactions.    UNOS Patient Status  Functional Status: 80% - Normal activity  with effort: some symptoms of disease  Physical Capacity: Limited Mobility  Working for Income: No  If no, reason not working: Unknown    Johny Zarate MD

## 2020-02-11 NOTE — PROGRESS NOTES
OUTPATIENT CATHETERIZATION INSTRUCTIONS    You have been scheduled for a procedure in the catheterization lab on Monday, February 17, 2020.     Please report to the Cardiology Waiting Area on the Third floor of the hospital and check in at 7 AM.   You will then be taken to the SSCU (Short Stay Cardiac Unit) and prepared for your procedure. Please be aware that this is not the time of your procedure but the time you are to arrive. The procedures are scheduled on an hourly basis; however, emergency cases take precedence over all other cases.       You may not have anything to eat or drink after midnight the night before your test. You may take your regular morning medications with water. If there are any medications that you should not take you will be instructed to hold them that morning. If you are diabetic and on Metformin (Glucophage) do not take it the day before, the day of, and for 2 days after your procedure.      The procedure will take 1-2 hours to perform. After the procedure, you will return to SSCU on the third floor of the hospital. You will need to lie still (or keep your arm still) for the next 4 to 6 hours to minimize bleeding from the puncture site. Your family may remain in the room with you during this time.       You may be able to be discharged home that same afternoon if there is someone to drive you home and there were no complications. If you have one of the balloon, stent, or device procedures you may spend the night in the hospital. Your doctor will determine, based on your progress, the date and time of your discharge. The results of your procedure will be discussed with you before you are discharged. Any further testing or procedures will be scheduled for you either before you leave or you will be called with these appointments.       If you should have any questions, concerns, or need to change the date of your procedure, please call HELEN Crump @ [phone]    Special  Instructions:    None        THE ABOVE INSTRUCTIONS WERE GIVEN TO THE PATIENT VERBALLY AND THEY VERBALIZED UNDERSTANDING.  THEY DO NOT REQUIRE ANY SPECIAL NEEDS AND DO NOT HAVE ANY LEARNING BARRIERS.          Directions for Reporting to Cardiology Waiting Area in the Hospital  If you park in the Parking Garage:  Take elevators to the1st floor of the parking garage.  Continue past the gift shop, coffee shop, and piano.  Take a right and go to the gold elevators. (Elevator B)  Take the elevator to the 3rd floor.  Follow the arrow on the sign on the wall that says Cath Lab Registration/EP Lab Registration.  Follow the long hallway all the way around until you come to a big open area.  This is the registration area.  Check in at Reception Desk.    OR    If family is dropping you off:  Have them drop you off at the front of the Hospital under the green overhang.  Enter through the doors and take a right.  Take the E elevators to the 3rd floor Cardiology Waiting Area.  Check in at the Reception Desk in the waiting room.

## 2020-02-11 NOTE — TELEPHONE ENCOUNTER
Met with patient and caregiver in the clinic. Pt walked into clinic without assistance. Flavia, her caregiver, relayed that she fell in the apartments but did not hurt herself. She is attending PT outpatient and is slowly getting stronger. Reviewed her medications. Decision made to hold her Terbutaline (per Dr. Zarate0 due to constant shaking and tremors. Will monitor her heart rate and report back. Her Cr was elevated at 2.3. Stressed hydration. She admitted she was not drinking enough water. Will decrease her Tac dose to 2 /3 per Dr. Prieto. Will get repeat lab on Friday. She met with Dr. Capellan for LHC/ IVUS and it is scheduled for Monday 2/17. She will see Dr. Nuñez Thursday for the Lymphocele in her R groin which has not decreased. She has an appointment with Sleep Study  Friday also.     We also discussed her depression and I encouraged her to call Psych.

## 2020-02-11 NOTE — PROGRESS NOTES
Interventional Cardiology Clinic Note  Reason for Visit: S/p OHT surveillance LHC + IVUS  Referring Physician: Dr. Prieto    HPI:   Deborah Navas is a 49 y.o. female who presents for surveillance C + IVUS.     Ms. Navas is s/p OHTx on 12/16/2019 for NICM/ HFrEF with HM3 on 9/2019, removed at time of transplant, HLD, obesity, and OA. She has had two admissions since her transplant for worsening LEGER, volume overload, and nausea. Most recently from 1/20/20-1/27/20. She was diuresed with IV Lasix and she was transitioned to terbutaline from theophylline due to tremors. All of her EMB have been negative for rejection. Bx this morning is still pending. She has continued to have issues with renal function and tremors. Additionally, she has a large right groin seroma as a result of the transplant surgery. She saw Dr. Zarate today and was instructed to hold her terbutaline and was also referred to [facility] regarding the seroma. Her tremors are still very disabling. Otherwise, her nausea has improved significantly. She still has LE swelling and LEGER. She also complains of dizziness.     ROS:    Constitution: Negative for diaphoresis and malaise/fatigue.   HENT: Negative for nosebleeds. Negative for vision changes.   Cardiovascular: Negative for chest pain, claudication, near-syncope, orthopnea, palpitations, paroxysmal nocturnal dyspnea and syncope. Positive for dyspnea on exertion, leg swelling.  Respiratory: Negative for snoring and wheezing. Positive for shortness of breath.     Hematologic/Lymphatic: Negative for bleeding problem. Does not bruise/bleed easily. Positive for right groin lymphocele.    Musculoskeletal: Negative for muscle cramps and myalgias.   Gastrointestinal: Negative for bloating, abdominal pain, nausea and vomiting. Positive for nausea.   Neurological: Negative for extremity weakness or numbness, headaches. Positive for light-headedness.   PMH:     Past Medical History:   Diagnosis Date     Fractures     History of ventricular fibrillation 9/4/2019    History of ventricular tachycardia 9/4/2019    Hyperlipidemia     Migraine     Multinodular goiter 9/5/2019    Osteoarthritis     Restrictive cardiomyopathy post heart transplant      Past Surgical History:   Procedure Laterality Date    BACK SURGERY      2007    BIOPSY WITH ULTRASOUND GUIDANCE N/A 12/31/2019    Procedure: BIOPSY, WITH US GUIDANCE;  Surgeon: Eric Antunez Jr., MD;  Location: [facility] CATH LAB;  Service: Cardiology;  Laterality: N/A;    BIOPSY WITH ULTRASOUND GUIDANCE N/A 1/7/2020    Procedure: BIOPSY, WITH US GUIDANCE;  Surgeon: Renetta Chaudhari MD;  Location: [facility] CATH LAB;  Service: Cardiology;  Laterality: N/A;    BIOPSY WITH ULTRASOUND GUIDANCE N/A 1/14/2020    Procedure: BIOPSY, WITH US GUIDANCE;  Surgeon: Renetta Chaudhari MD;  Location: [facility] CATH LAB;  Service: Cardiology;  Laterality: N/A;    BIOPSY WITH ULTRASOUND GUIDANCE N/A 1/28/2020    Procedure: BIOPSY, WITH US GUIDANCE;  Surgeon: Jolene Lua MD;  Location: [facility] CATH LAB;  Service: Cardiology;  Laterality: N/A;    BONE GRAFT Left     from Left hip to Left FA    eardrum reconstruction  1980    ELBOW SURGERY Left [id number]    FOREARM FRACTURE SURGERY Bilateral [id number]    multiple surgeries    HEART TRANSPLANT N/A 12/16/2019    Procedure: TRANSPLANT, HEART;  Surgeon: Talha Nuñez MD;  Location: [address];  Service: Cardiothoracic;  Laterality: N/A;    INSERTION OF GRAFT TO PERICARDIUM  9/10/2019    Procedure: INSERTION, GRAFT, PERICARDIUM;  Surgeon: Shar Walden MD;  Location: [facility] OR [address] [place] [address];  Service: Cardiovascular;;    INSERTION OF IMPLANTABLE CARDIOVERTER-DEFIBRILLATOR (ICD) GENERATOR WITH TWO EXISTING LEADS      INSERTION OF PACEMAKER Left     LEFT VENTRICULAR ASSIST DEVICE N/A 9/10/2019    Procedure: INSERTION-LEFT VENTRICULAR ASSIST DEVICE;  Surgeon: Shar Walden MD;  Location: [facility] OR [address] [place] [address];  Service: Cardiovascular;   Laterality: N/A;  DT HM3     LUMBAR FUSION  2007    L4-L5    RIGHT HEART CATHETERIZATION Right 9/4/2019    Procedure: INSERTION, CATHETER, RIGHT HEART;  Surgeon: Vi Bryant MD;  Location: [facility] CATH LAB;  Service: Cardiology;  Laterality: Right;    RIGHT HEART CATHETERIZATION N/A 11/18/2019    Procedure: INSERTION, CATHETER, RIGHT HEART;  Surgeon: Eric Antunez Jr., MD;  Location: [facility] CATH LAB;  Service: Cardiology;  Laterality: N/A;    RIGHT HEART CATHETERIZATION Right 12/31/2019    Procedure: INSERTION, CATHETER, RIGHT HEART;  Surgeon: Eric Antunez Jr., MD;  Location: [facility] CATH LAB;  Service: Cardiology;  Laterality: Right;    RIGHT HEART CATHETERIZATION Right 1/7/2020    Procedure: INSERTION, CATHETER, RIGHT HEART;  Surgeon: Renetta Chaudhari MD;  Location: [facility] CATH LAB;  Service: Cardiology;  Laterality: Right;    SINUS SURGERY Right 1994    with lymph nodes    STERNAL WOUND CLOSURE  9/10/2019    Procedure: CLOSURE, WOUND, STERNUM;  Surgeon: Shar Walden MD;  Location: [facility] OR [facility] FLR;  Service: Cardiovascular;;    TEMPOROMANDIBULAR JOINT SURGERY Right 1988    TONSILLECTOMY      TREATMENT OF CARDIAC ARRHYTHMIA N/A 9/24/2019    Procedure: CARDIOVERSION;  Surgeon: Moe Barrera MD;  Location: [facility] EP LAB;  Service: Cardiology;  Laterality: N/A;  AF, DCCV/NADINE, anes, DM, Rm 3073    TYMPANOSTOMY TUBE PLACEMENT  1971- 1979    multiple tube placements     Allergies:     Review of patient's allergies indicates:   Allergen Reactions    Adhesive Blisters     Reaction to area in chest and up only    Codeine Itching     Medications:     Current Outpatient Medications on File Prior to Visit   Medication Sig Dispense Refill    amLODIPine (NORVASC) 5 MG tablet Take 1 tablet (5 mg total) by mouth once daily. 30 tablet 11    aspirin (ECOTRIN) 81 MG EC tablet Take 1 tablet (81 mg total) by mouth once daily. 30 tablet 11    atorvastatin (LIPITOR) 20 MG tablet Take 1 tablet (20 mg total) by  mouth once daily. 90 tablet 3    calcium carbonate-vitamin D3 1,000 mg(2,500 mg)-800 unit Tab Take 1 tablet by mouth once daily. 30 tablet 11    dapsone 100 MG Tab Take 1 tablet (100 mg total) by mouth once daily. 30 tablet 11    ferrous sulfate (FEOSOL) 325 mg (65 mg iron) Tab tablet Take 1 tablet (325 mg total) by mouth 2 (two) times daily. 60 tablet 2    gabapentin (NEURONTIN) 300 MG capsule Take 1 capsule (300 mg total) by mouth every evening. 30 capsule 11    magnesium oxide (MAG-OX) 400 mg (241.3 mg magnesium) tablet Take 1 tablet (400 mg total) by mouth once daily. 30 tablet 11    mycophenolate (CELLCEPT) 250 mg Cap Take 2 capsules (500 mg total) by mouth 2 (two) times daily. 120 capsule 11    oxyCODONE (ROXICODONE) 10 mg Tab immediate release tablet Take 1 tablet (10 mg total) by mouth every 8 (eight) hours as needed. 21 tablet 0    pantoprazole (PROTONIX) 40 MG tablet Take 1 tablet (40 mg total) by mouth once daily. 30 tablet 11    predniSONE (DELTASONE) 5 MG tablet Take 2 tablets (10 mg total) by mouth once daily. 60 tablet 2    senna-docusate 8.6-50 mg (PERICOLACE) 8.6-50 mg per tablet Take 2 tablets by mouth 2 (two) times daily as needed for Constipation.      SITagliptin (JANUVIA) 50 MG Tab Take 1 tablet (50 mg total) by mouth once daily. 30 tablet 3    tacrolimus (PROGRAF) 1 MG Cap Take 3 capsules (3 mg total) by mouth every morning AND 3 capsules (3 mg total) every evening. 180 capsule 11    valGANciclovir (VALCYTE) 450 mg Tab Take 1 tablet (450 mg total) by mouth once daily. 15 tablet 11    venlafaxine (EFFEXOR-XR) 150 MG Cp24 Take 1 capsule (150 mg total) by mouth once daily. 30 capsule 11    zolpidem (AMBIEN) 5 MG Tab Take 1 tablet (5 mg total) by mouth nightly as needed for insomnia. 30 tablet 1    opw transplant care kit Welcome to [facility].  This is your transplant care kit. (Patient not taking: Reported on 2/11/2020) 1 kit 0    terbutaline (BRETHINE) 5 mg Tab  "Take 1 tablet (5 mg total) by mouth 3 (three) times daily. (Patient not taking: Reported on 2/11/2020) 90 tablet 3     Current Facility-Administered Medications on File Prior to Visit   Medication Dose Route Frequency Provider Last Rate Last Dose    [DISCONTINUED] lidocaine HCL 20 mg/ml (2%) injection    PRN Renetta Chaudhari MD   20 mL at 02/11/20 1003    [DISCONTINUED] sodium chloride 0.9% irrigation    PRN Renetta Chaudhari MD   250 mL at 02/11/20 1003     Social History:     Social History     Tobacco Use    Smoking status: Never Smoker    Smokeless tobacco: Never Used   Substance Use Topics    Alcohol use: No     Family History:     Family History   Problem Relation Age of Onset    Osteoarthritis Mother     Migraines Mother     Osteoarthritis Maternal Grandmother     Migraines Maternal Grandmother     Broken bones Maternal Grandmother     Osteoporosis Maternal Grandmother     Dislocations Maternal Grandmother     Scoliosis Maternal Grandmother     Osteoarthritis Paternal Grandmother     Cancer Paternal Grandmother         leukemia    Migraines Paternal Grandmother     Obesity Paternal Grandmother     Osteoarthritis Paternal Grandfather     Heart failure Paternal Grandfather     Migraines Paternal Grandfather     Heart failure Maternal Grandfather     Migraines Maternal Grandfather     Osteoarthritis Maternal Grandfather     Stroke Father     Osteoarthritis Father      Physical Exam:   BP (!) 107/57 (BP Location: Right arm, Patient Position: Sitting, BP Method: Large (Automatic))   Pulse 88   Ht 5' 7" (1.702 m)   Wt 99.1 kg (218 lb 7.6 oz)   LMP  (LMP Unknown)   SpO2 (!) 93%   BMI 34.22 kg/m²      Constitutional: NAD, conversant  HEENT: Sclera anicteric, PERRLA, EOMI  Neck: No JVD, no carotid bruits  CV: RRR, no murmur, normal S1/S2  Vascular:  L radial  2+,   R radial  2+,     L posterior tibial  1+,  R posterior tibial  1+    L dorsalis pedis  2+, R dorsalis pedis  1+     Pulm: CTAB, " no wheezes, rales, or ronchi  GI: Abdomen soft, NTND, +BS, large right groin seroma  Extremities: 2+ LE edema, warm and well perfused  Skin: No ecchymosis, erythema, or ulcers  Psych: AOx3, appropriate affect  Neuro: No gross deficits    Labs:     Lab Results   Component Value Date     2020     2019    K 4.4 2020    K 4.5 2019     2020    CL 24 10/10/2019    CO2 22 (L) 2020    BUN 21 (H) 2020    BUN 20 2019    CREATININE 2.3 (H) 2020    CREATININE 1.4 2019    ANIONGAP 14 2020     Lab Results   Component Value Date    HGBA1C 5.8 (H) 2020     Lab Results   Component Value Date     (H) 2020     (H) 2020     (H) 2020    Lab Results   Component Value Date    WBC 6.22 2020    HGB 8.5 (L) 2020    HCT 30.2 (L) 2020    HCT 24 (L) 2019     (H) 2020    GRAN 5.2 2020    GRAN 83.8 (H) 2020     Lab Results   Component Value Date    CHOL 189 2020    HDL 99 (H) 2020    LDLCALC 73.4 2020    TRIG 83 2020          Imagin. TTE (20)  · Stable HARSH Biopsy Study  · Normal left ventricular systolic function. The estimated ejection fraction is 63%.  · Septal wall has abnormal motion.  · Normal LV diastolic function.  · Low normal right ventricular systolic function.  · Mild mitral regurgitation.  · Moderate tricuspid regurgitation.  · Mild pulmonic regurgitation.  · Normal central venous pressure (3 mmHg).  · The estimated PA systolic pressure is 35 mmHg.    Assessment:     1. Heart transplanted    2. Stage 4 chronic kidney disease    3. Immunosuppression    4. 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 Tremor    6. Physical deconditioning      Plan:     Heart transplanted  --- Anti-platelet Therapy: ASA 81 mg Daily  - Access: R CFA  - Creatinine/GFR: 2.3 on 20 (baseline 1.8-1.9)  - Allergies:  No shellfish/Iodine allergy  - Pre-Hydration: 3 cc/kg/hr IV, continuous, for 1 hour, Pre-Procedure  - Pre-Op Med: Diphenhydramine (Benadryl) 50 mg, Oral, Once, Pre-Procedure   - The risks, benefits & alternatives of the procedure were explained to the patient  - The risks of coronary angiography include but are not limited to: Bleeding, infection, heart rhythm abnormalities, allergic reactions, kidney injury requiring dilaysis, limb loss, stroke and death  - Should stenting be indicated, the patient has agreed to dual anti-platelet therapy for 1-consecutive year with a drug-eluting stent and a minimum of 1-month with the use of a bare metal stent  - Additionally, pt is aware that non-compliance is likely to result in stent clotting with heart attack, heart failure, and/or death  - The risks of moderate sedation include hypotension, respiratory depression, arrhythmias, bronchospasm, & death  - All patient's questions were answered  - Informed consent was obtained & the patient is agreeable to proceed with the procedure      CKD (chronic kidney disease)  --Creatinine up this AM. Recheck in 3 days and morning of cath.       Signed:  Vale Castillo PA-C  Interventional Cardiology   [id number]  2/11/2020 2:20 PM  I have personally taken the history and examined this patient. I have discussed and agree with the resident's findings and plan as documented in the resident's note.    Bebeto Capellan

## 2020-02-12 ENCOUNTER — CLINICAL SUPPORT (OUTPATIENT)
Dept: REHABILITATION | Facility: HOSPITAL | Age: 50
End: 2020-02-12
Payer: COMMERCIAL

## 2020-02-12 DIAGNOSIS — R29.898 POOR FINE MOTOR SKILLS: ICD-10-CM

## 2020-02-12 DIAGNOSIS — Z74.09 DECREASED FUNCTIONAL MOBILITY AND ENDURANCE: ICD-10-CM

## 2020-02-12 DIAGNOSIS — M62.81 MUSCLE WEAKNESS OF LOWER EXTREMITY: ICD-10-CM

## 2020-02-12 DIAGNOSIS — Z78.9 IMPAIRED INSTRUMENTAL ACTIVITIES OF DAILY LIVING (IADL): ICD-10-CM

## 2020-02-12 DIAGNOSIS — R53.81 PHYSICAL DECONDITIONING: ICD-10-CM

## 2020-02-12 DIAGNOSIS — Z74.09 IMPAIRED FUNCTIONAL MOBILITY, BALANCE, AND ENDURANCE: ICD-10-CM

## 2020-02-12 LAB
FINAL PATHOLOGIC DIAGNOSIS: NORMAL
GROSS: NORMAL
MICROSCOPIC EXAM: NORMAL

## 2020-02-12 PROCEDURE — 97530 THERAPEUTIC ACTIVITIES: CPT | Mod: PO

## 2020-02-12 PROCEDURE — 97110 THERAPEUTIC EXERCISES: CPT | Mod: PO

## 2020-02-12 PROCEDURE — 97110 THERAPEUTIC EXERCISES: CPT | Mod: PO | Performed by: PHYSICAL THERAPIST

## 2020-02-12 NOTE — PROGRESS NOTES
Physical Therapy Daily Treatment Note     Name: Deborah Oliva [facility] Number: [id number]    Therapy Diagnosis:   Encounter Diagnoses   Name Primary?    Poor fine motor skills     Impaired instrumental activities of daily living (IADL)     Decreased functional mobility and endurance     Muscle weakness of lower extremity     Impaired functional mobility, balance, and endurance     Physical deconditioning      Physician: Americo Albert NP    Visit Date: 2/12/2020    Physician Orders: PT Eval and Treat deconditioned / weakness  Medical Diagnosis from Referral:   Z94.1 (ICD-10-CM) - Heart transplanted     Evaluation Date: 1/28/2020  Authorization Period Expiration: 1/21/2021  Plan of Care Expiration: 4/28/2020  Visit # / Visits authorized: 1/ 20 (plus 2 visit for the initial evaluation and first tx visit)    Time In: 1110  Time Out:1205   Total Billable Time: 55 minutes    Precautions: Standard and Fall    Subjective     Pt reports:  She has been feeling pretty good. Still with dyspnea that comes and goes. Sometimes it does not take much. Fell on Sunday night. Just wobbly that day and was walking to go to bed when legs just gave out. No pain.   She was partially compliant with HEP.  Response to previous treatment: Functional change: ongoing    Pain: 0/10  Location: N/A    Objective     Deborah received therapeutic exercises to develop strength, endurance, ROM, flexibility, posture and core stabilization for 55 minutes including:    Pulse and O2 levels at pre, during and post tx session  101 pulse/94 O2 level pre  90 pulse/93 O2 level during  97 pulse/94 O2 level during  90 pulse/94 O2 level post    .BOLD INDICATES ACTIVITIES PERFORMED    UBE L1 for 6 min    Supine:  PPT with TA bracing 15x with   Hooklying marching with PPT and TA bracing 3x30 sec  Bridges x10 on heels  SLR  Alternate x10  SLR / arms alternate x5 =   4 count hor ab/ad w/scap prot/retrac  x10     Side-lying:   Clamshells with orange  TB: 2x20 reps     Sitting:  Pallof press with orange TB: 2x12 reps   Incline bench press with 4 lbs dumb bells   Bicep curls with 3 lbs dumb bells x15   D2 diagonals x15 3#  Shoulder press x15   Shoulder circles  x15  LAQ x25      Standing:   Marching hi-knee x15   Leg swings abd x15  Leg swings ext x15   Squatting x15     Glut dominant squats with UE support 2x7 reps  Step ups with 4 inch step 2x10 reps   Heel raises 2x15 reps with 3 sec holds  gastroc stretch 2x30 sec  Toe raises 2x15 reps with 3 sec holds  Rows with green TB: 2x10 reps  Shoulder extensions with orange TB: 2x10 reps    Recumbent bike L3 for 5 min      Home Exercises Provided and Patient Education Provided     Education provided: Pt was educated on HEP and all therapeutic exercises initiated during today's tx visit.       Written Home Exercises Provided: Patient instructed to cont prior HEP.  Exercises were reviewed and Deborah was able to demonstrate them prior to the end of the session.  Deborah demonstrated good  understanding of the education provided.     See EMR under Patient Instructions for exercises provided 1/31/2020.    Assessment     The patient performed the activities noted. She completed all however weights were DC with OH press due to fatigue. She was not hampered by SOB that required cessation of any exercise. Routine modified using hi reps with focus on LE for muscle endurance and UE for CV response. Overall the patient did well with O2 and NM response.     Deborah is progressing well towards her goals.   Pt prognosis is Fair.     Pt will continue to benefit from skilled outpatient physical therapy to address the deficits listed in the problem list box on initial evaluation, provide pt/family education and to maximize pt's level of independence in the home and community environment.     Pt's spiritual, cultural and educational needs considered and pt agreeable to plan of care and goals.     Anticipated barriers to physical  therapy:tolerance level     Short Term Goals (6 Weeks):      1.Pt to increase strength by a 1/2 grade of muscles test to allow for improvement in functional activities such as performing chores.  2.Pt to improve range of motion by 25% to allow for improved functional mobility to allow for improvement in IA DLS.   3.Pt to report compliance with HEP and demonstrate proper exercise technique to PT to show competence with self management of condition.  4.Decrease pain by 25% during functional activities.     Long Term Goals (12 Weeks):      1. Increase ROM to allow improved joint biomechanics during functional activities.   2.Increase trunk and lower extremity strength to within normal limits during functional activities.   3. Independent with home exercise program.   4. Full return to functional activities with manageable complaints.  5. Patient to demonstrate improved posture and body mechanics.  6. Decrease pain by 75% during functional activities.    Plan   Continue POC established by PT, including: BLE/BUE strengthening, general conditioning, and improve safety with functional activities the require single leg stability.       Javier Maldonado, PT

## 2020-02-12 NOTE — PROGRESS NOTES
Occupational Therapy Daily Treatment Note     Date: 2/12/2020  Name: Deborah Oliva [place]  Clinic Number: [id number]    Therapy Diagnosis:   Encounter Diagnoses   Name Primary?    Poor fine motor skills     Impaired instrumental activities of daily living (IADL)     Decreased functional mobility and endurance     Muscle weakness of lower extremity     Impaired functional mobility, balance, and endurance     Physical deconditioning      Physician: Americo Albert NP    Physician Orders: Evaluate and treat  Medical Diagnosis: Heart transplant, 12/16/19  Evaluation Date: 1/31/2020  Plan of Care Expiration Period: 4/3/2020  Insurance Authorization period Expiration: 12/31/2020  Date of Return to MD: 2/11/2020 Elliot (Heart transplant)   Visit # / Visits Authorized: 1 / future visits pending     Time In:10:00 AM  Time Out: 1:00 AM  Total Billable (one on one) Time: 55 minutes     Precautions: Standard, Immunosuppression and organ transplant    Subjective     Pt reports: No new c/o.  Response to previous treatment:Lit well    Pain: 0/10    Objective       Deborah received therapeutic exercises for BUE's for 40 minutes including:  -Juxacisor 3'  -Isospheres 3'   -Coins 1 container  -Large resistive pegs    Deborah received therapeutic activities for 15 minutes including:  -Education re: adaptive equipment for ADL's and leisure activities      Home Exercises and Education Provided     Education provided:   - Per above  - Progress towards goals     Written Home Exercises Provided: Patient instructed to cont prior HEP.  Exercises were reviewed and Deborah was able to demonstrate them prior to the end of the session.  Deborah demonstrated good  understanding of the HEP provided.   .   See EMR under Patient Instructions for exercises provided prior visit.        Assessment     Pt would continue to benefit from skilled OT to maximize BUE function.     Deborah is progressing towards her goals and there are  no updates to goals at this time. Pt prognosis is Fair.     Pt will continue to benefit from skilled outpatient occupational therapy to address the deficits listed in the problem list on initial evaluation provide pt/family education and to maximize pt's level of independence in the home and community environment.       Pt's spiritual, cultural and educational needs considered and pt agreeable to plan of care and goals.    Goals:  Short Term Goals: 3 weeks   ROM/Strength to perform the ADL's and functional activities listed below,    Pt will improve functional  strength needed for tasks to 35# in L hand.  Pt will improve functional  strength needed for tasks to 44# in R hand.  Pt will improve functional lateral pinch strength needed for tasks to 3# in L hand.  Pt will improve functional lateral pinch strength needed for tasks to 6# in R hand.  Pt will be Independent with HEP to improve strength and FM skills.      Long Term Goals: 6 weeks   ROM/Strength to perform the ADL's and functional activities listed below  Pt will improve functional  strength needed for tasks to 40# in L hand.  Pt will improve functional  strength needed for tasks to 48# in R hand.  Pt will improve functional lateral pinch strength needed for tasks to 4# in L hand.  Pt will improve functional lateral pinch strength needed for tasks to 8# in R hand.  Pt will perform functional standing activity for 15 minutes without sitting rest break.   G code self care CK.     Plan   Cont OT to address above goals.        NIK Russ OTR/L, CHT

## 2020-02-13 ENCOUNTER — OFFICE VISIT (OUTPATIENT)
Dept: CARDIOTHORACIC SURGERY | Facility: CLINIC | Age: 50
End: 2020-02-13
Payer: COMMERCIAL

## 2020-02-13 ENCOUNTER — TELEPHONE (OUTPATIENT)
Dept: TRANSPLANT | Facility: CLINIC | Age: 50
End: 2020-02-13

## 2020-02-13 ENCOUNTER — HOSPITAL ENCOUNTER (OUTPATIENT)
Dept: RADIOLOGY | Facility: HOSPITAL | Age: 50
Discharge: HOME | End: 2020-02-13
Attending: THORACIC SURGERY (CARDIOTHORACIC VASCULAR SURGERY)
Payer: COMMERCIAL

## 2020-02-13 ENCOUNTER — TELEPHONE (OUTPATIENT)
Dept: SLEEP MEDICINE | Facility: CLINIC | Age: 50
End: 2020-02-13

## 2020-02-13 ENCOUNTER — EXTERNAL HOME HEALTH (OUTPATIENT)
Dept: HOME HEALTH SERVICES | Facility: HOSPITAL | Age: 50
End: 2020-02-13
Payer: COMMERCIAL

## 2020-02-13 VITALS
BODY MASS INDEX: 33.71 KG/M2 | HEIGHT: 67 IN | HEART RATE: 88 BPM | DIASTOLIC BLOOD PRESSURE: 76 MMHG | SYSTOLIC BLOOD PRESSURE: 137 MMHG | WEIGHT: 214.75 LBS | OXYGEN SATURATION: 96 %

## 2020-02-13 DIAGNOSIS — R18.8 GROIN FLUID COLLECTION: ICD-10-CM

## 2020-02-13 DIAGNOSIS — Z94.1 HEART TRANSPLANTED: Primary | ICD-10-CM

## 2020-02-13 PROCEDURE — 76882 US LMTD JT/FCL EVL NVASC XTR: CPT | Mod: 26,RT,, | Performed by: RADIOLOGY

## 2020-02-13 PROCEDURE — 99024 PR POST-OP FOLLOW-UP VISIT: ICD-10-PCS | Mod: S$GLB,,, | Performed by: THORACIC SURGERY (CARDIOTHORACIC VASCULAR SURGERY)

## 2020-02-13 PROCEDURE — 76882 US EXTREMITY NON VASCULAR LIMITED RIGHT: ICD-10-PCS | Mod: 26,RT,, | Performed by: RADIOLOGY

## 2020-02-13 PROCEDURE — 99999 PR PBB SHADOW E&M-EST. PATIENT-LVL III: CPT | Mod: PBBFAC,,, | Performed by: THORACIC SURGERY (CARDIOTHORACIC VASCULAR SURGERY)

## 2020-02-13 PROCEDURE — 76882 US LMTD JT/FCL EVL NVASC XTR: CPT | Mod: TC,RT

## 2020-02-13 PROCEDURE — 99999 PR PBB SHADOW E&M-EST. PATIENT-LVL III: ICD-10-PCS | Mod: PBBFAC,,, | Performed by: THORACIC SURGERY (CARDIOTHORACIC VASCULAR SURGERY)

## 2020-02-13 PROCEDURE — 99024 POSTOP FOLLOW-UP VISIT: CPT | Mod: S$GLB,,, | Performed by: THORACIC SURGERY (CARDIOTHORACIC VASCULAR SURGERY)

## 2020-02-13 NOTE — PROGRESS NOTES
Patient seen and examined. Patient is progressively increasing activity. No significant complaints.     Sternum: stable, incision CDI  Right groin site has doubled in size and has discomfort due to size. Skin is pink dry and no drainage      Assessment:  S/P OHT  Reviewed US- lymphoceles      Plan:  Drained lymphocele, removed 180 ml of clear yellow fluid   We will refer to cardiology to assume care       No scheduled appointment, RTC prn    CTS Attending Note:    I have personally taken the history and examined this patient and agree with the CHESTER's note as stated above. Right groin lymphocele.  No indication of infection.  Prepped and draped, and aspirated 180 mL of straw-colored fluid.  Recommended compression shorts to attempt to get to heal.  If lymphocele recurs, we will refer her to vascular for possible injection of sclerosing agent.

## 2020-02-13 NOTE — TELEPHONE ENCOUNTER
Patient is 2 months out in 3 days from OHT.      Creatinine elevated at 2.3 and tacro elevated at 12.4.      Will reduce her tacrolimus to 2/3 and repeat level.     Did ask for increased hydration.      Of note, will be holding off on IVUS/LHC for now given elevated renal function.      Follow up on sleep clinic visit.      PET scan pending for sarcoidosis.          Documentation

## 2020-02-13 NOTE — TELEPHONE ENCOUNTER
I called pt to ask if she wanted to come in to clinic to be seen or if she wanted to schedule sleep study since order has been approved for sleep study. Pt stated that she would like to come into clinic.

## 2020-02-14 ENCOUNTER — TELEPHONE (OUTPATIENT)
Dept: TRANSPLANT | Facility: CLINIC | Age: 50
End: 2020-02-14

## 2020-02-14 ENCOUNTER — TELEPHONE (OUTPATIENT)
Dept: SLEEP MEDICINE | Facility: CLINIC | Age: 50
End: 2020-02-14

## 2020-02-14 ENCOUNTER — LAB VISIT (OUTPATIENT)
Dept: LAB | Facility: HOSPITAL | Age: 50
End: 2020-02-14
Payer: COMMERCIAL

## 2020-02-14 ENCOUNTER — CLINICAL SUPPORT (OUTPATIENT)
Dept: REHABILITATION | Facility: HOSPITAL | Age: 50
End: 2020-02-14
Payer: COMMERCIAL

## 2020-02-14 DIAGNOSIS — Z74.09 IMPAIRED FUNCTIONAL MOBILITY, BALANCE, AND ENDURANCE: ICD-10-CM

## 2020-02-14 DIAGNOSIS — Z94.1 HEART REPLACED BY TRANSPLANT: ICD-10-CM

## 2020-02-14 DIAGNOSIS — R29.898 POOR FINE MOTOR SKILLS: ICD-10-CM

## 2020-02-14 DIAGNOSIS — Z74.09 DECREASED FUNCTIONAL MOBILITY AND ENDURANCE: ICD-10-CM

## 2020-02-14 DIAGNOSIS — Z79.899 ENCOUNTER FOR LONG-TERM (CURRENT) USE OF MEDICATIONS: ICD-10-CM

## 2020-02-14 DIAGNOSIS — Z94.1 STATUS POST HEART TRANSPLANT: ICD-10-CM

## 2020-02-14 DIAGNOSIS — R53.81 PHYSICAL DECONDITIONING: ICD-10-CM

## 2020-02-14 DIAGNOSIS — Z95.811 HEART REPLACED BY HEART ASSIST DEVICE: ICD-10-CM

## 2020-02-14 DIAGNOSIS — N18.4 CKD (CHRONIC KIDNEY DISEASE), STAGE IV: Primary | ICD-10-CM

## 2020-02-14 DIAGNOSIS — Z78.9 IMPAIRED INSTRUMENTAL ACTIVITIES OF DAILY LIVING (IADL): ICD-10-CM

## 2020-02-14 DIAGNOSIS — M62.81 MUSCLE WEAKNESS OF LOWER EXTREMITY: ICD-10-CM

## 2020-02-14 DIAGNOSIS — T86.298 OTHER COMPLICATION OF HEART TRANSPLANT: ICD-10-CM

## 2020-02-14 DIAGNOSIS — N18.4 STAGE 4 CHRONIC KIDNEY DISEASE: ICD-10-CM

## 2020-02-14 LAB
ANION GAP SERPL CALC-SCNC: 11 MMOL/L (ref 8–16)
BNP SERPL-MCNC: 299 PG/ML (ref 0–99)
BUN SERPL-MCNC: 23 MG/DL (ref 6–20)
CALCIUM SERPL-MCNC: 9.8 MG/DL (ref 8.7–10.5)
CHLORIDE SERPL-SCNC: 105 MMOL/L (ref 95–110)
CO2 SERPL-SCNC: 25 MMOL/L (ref 23–29)
CREAT SERPL-MCNC: 2.3 MG/DL (ref 0.5–1.4)
EST. GFR  (AFRICAN AMERICAN): 27.7 ML/MIN/1.73 M^2
EST. GFR  (NON AFRICAN AMERICAN): 24.1 ML/MIN/1.73 M^2
GLUCOSE SERPL-MCNC: 93 MG/DL (ref 70–110)
POTASSIUM SERPL-SCNC: 4.6 MMOL/L (ref 3.5–5.1)
SODIUM SERPL-SCNC: 141 MMOL/L (ref 136–145)
TACROLIMUS BLD-MCNC: 13.8 NG/ML (ref 5–15)

## 2020-02-14 PROCEDURE — 36415 COLL VENOUS BLD VENIPUNCTURE: CPT

## 2020-02-14 PROCEDURE — 97110 THERAPEUTIC EXERCISES: CPT | Mod: PO | Performed by: PHYSICAL THERAPIST

## 2020-02-14 PROCEDURE — 83880 ASSAY OF NATRIURETIC PEPTIDE: CPT

## 2020-02-14 PROCEDURE — 97112 NEUROMUSCULAR REEDUCATION: CPT | Mod: PO

## 2020-02-14 PROCEDURE — 80048 BASIC METABOLIC PNL TOTAL CA: CPT

## 2020-02-14 PROCEDURE — 80197 ASSAY OF TACROLIMUS: CPT

## 2020-02-14 NOTE — PATIENT INSTRUCTIONS
Strengthening (Resistive Putty)        Squeeze putty using thumb and all fingers.  Repeat 10 times. Do 2-3 sessions per day.      Flexion (Resistive Putty)        Keeping fingertips straight, press putty towards base of palm.  Repeat 10 times. Do 2-3 sessions per day.    Copyright © I. All rights reserved.     Extension (Assistive Putty)        Roll putty back and forth, being sure to use all fingertips.  Repeat 10 times. Do 2-3 sessions per day.    Copyright © I. All rights reserved.     Finger / Thumb Extensors        Place right fingers and thumb in center of putty donut, stretch out.  Repeat 10 times. Do 2-3 sessions per day.    Copyright © I. All rights reserved.     Extension (Resistive Putty)        Straighten thumb inside putty loop anchored by fingers.  Repeat 10 times. Do 2-3 sessions per day.    Copyright © I. All rights reserved.     Adduction (Resistive Putty)        Press between 2 fingers and pull putty anchored with other hand. Repeat with all fingers.  Repeat 10 times. Do 2-3 sessions per day.    Copyright © I. All rights reserved.     Palmar Pinch Strengthening (Resistive Putty)        Pinch putty between thumb and each fingertip in turn.  Repeat 10 times. Do 2-3 sessions per day.    Copyright © I. All rights reserved.     Lateral Pinch Strengthening (Resistive Putty)        Squeeze between thumb and side of each finger in turn.  Repeat 10 times. Do 2-3 sessions per day.    Copyright © I. All rights reserved.

## 2020-02-14 NOTE — TELEPHONE ENCOUNTER
"Spoke to pt regarding the followin. Lab results from today - Cr still @ 2.3. Tac level 13.9. Pt said she took med prior to lab because she saw it was non-fasting. Will repeat Monday on the former change of 2/3 Tac.  2. Pt vomited after PT/OT today. Did not eat this morning before exercising except for a Boost and the meds. She did not see meds in her vomitus. I told her she HAS to try and eat and to HYDRATE. I emphasized calling the Dr on call this weekend if this occurs again.  3. I asked about the Sleep Study cancellation. She said "they" told her she might be able to just get the study without having to meet the clinic provider. I said I would look into this.   4. She verified that her lymphocele was drained but fluid has RETURNED. I said that I will let Dr. Prieto know so that we can discuss with surgeon, Savannah.  "

## 2020-02-14 NOTE — TELEPHONE ENCOUNTER
----- Message from Ashli Reyes sent at 2/14/2020 11:52 AM CST -----  Contact: Pt   Name of Who is Calling: SHELL VILLARREAL [[id number]]    What is the request in detail: Patient is requesting a call back regards to having a sleep study ......Please contact to further discuss and advise      Can the clinic reply by [facility]: No     What Number to Call Back if not in [facility]:  [phone] (home)

## 2020-02-14 NOTE — PROGRESS NOTES
Physical Therapy Daily Treatment Note     Name: Deborah Oliva [facility] Number: [id number]    Therapy Diagnosis:   Encounter Diagnoses   Name Primary?    Poor fine motor skills     Impaired instrumental activities of daily living (IADL)     Decreased functional mobility and endurance     Muscle weakness of lower extremity     Impaired functional mobility, balance, and endurance     Physical deconditioning      Physician: Americo Albert NP    Visit Date: 2/14/2020    Physician Orders: PT Eval and Treat deconditioned / weakness  Medical Diagnosis from Referral:   Z94.1 (ICD-10-CM) - Heart transplanted     Evaluation Date: 1/28/2020  Authorization Period Expiration: 1/21/2021  Plan of Care Expiration: 4/28/2020  Visit # / Visits authorized: 1/ 20 (plus 2 visit for the initial evaluation and first tx visit)    Time In: 1015  Time Out:1110  Total Billable Time: 55 minutes    Precautions: Standard and Fall    Subjective     Pt reports: she is doing better. Says that she does not feel as weak.      She was partially compliant with HEP.  Response to previous treatment: sore but got up and did quite a bit of walking.     Functional change: Felt like I was walking better and SOB was not as bad.     Pain: 0/10  Location: N/A    Objective     Deborah received therapeutic exercises to develop strength, endurance, ROM, flexibility, posture and core stabilization for 55 minutes including:    Pulse and O2 levels at pre, during and post tx session  81 pulse/92 O2 level pre  90 pulse/93 O2 level during  97 pulse/94 O2 level during  91pulse/  93 O2 level post    .BOLD INDICATES ACTIVITIES PERFORMED    UBE L2 for 6 min    Supine:  PPT with TA bracing 15x with   Hooklying marching with PPT and TA bracing 3x30 sec  Bridges x10 on heels  SLR  Alternate x10  SLR / arms alternate x5 =   4 count hor ab/ad w/scap prot/retrac  x10     Side-lying:   Clamshells with orange TB: 2x20 reps     Sitting:  Pallof press with orange  TB: 2x12 reps   Incline bench press with 4 lbs dumb bells   Bicep curls with 3 lbs dumb bells x15   D2 diagonals x15 3#  Shoulder press x15   Shoulder circles  x15  LAQ   x25      Standing:   Marching hi-knee x15   Leg swings abd x15  Leg swings ext x15   Squatting x15     Glut dominant squats with UE support 2x7 reps  Step ups with 4 inch step 2x10 reps   Heel raises 2x15 reps with 3 sec holds  gastroc stretch 2x30 sec  Toe raises 2x15 reps with 3 sec holds  Rows with green TB: 2x10 reps  Shoulder extensions with orange TB: 2x10 reps    Recumbent bike L3 for 5 min      Home Exercises Provided and Patient Education Provided     Education provided: Pt was educated on HEP and all therapeutic exercises initiated during today's tx visit.       Written Home Exercises Provided: Patient instructed to cont prior HEP.  Exercises were reviewed and Deborah was able to demonstrate them prior to the end of the session.  Deborah demonstrated good  understanding of the education provided.     See EMR under Patient Instructions for exercises provided 1/31/2020.    Assessment     Patient completed the routine noted. She maintained a consistent O2 sat level and elevated NV that she consistently maintained. She did not exhibit significant exercise induced dyspnea. Tolerance for the activities is improving.   Deborah is progressing well towards her goals.   Pt prognosis is Fair.     Pt will continue to benefit from skilled outpatient physical therapy to address the deficits listed in the problem list box on initial evaluation, provide pt/family education and to maximize pt's level of independence in the home and community environment.     Pt's spiritual, cultural and educational needs considered and pt agreeable to plan of care and goals.     Anticipated barriers to physical therapy:tolerance level     Short Term Goals (6 Weeks):      1.Pt to increase strength by a 1/2 grade of muscles test to allow for improvement in functional  activities such as performing chores.  2.Pt to improve range of motion by 25% to allow for improved functional mobility to allow for improvement in IA DLS.   3.Pt to report compliance with HEP and demonstrate proper exercise technique to PT to show competence with self management of condition.  4.Decrease pain by 25% during functional activities.     Long Term Goals (12 Weeks):      1. Increase ROM to allow improved joint biomechanics during functional activities.   2.Increase trunk and lower extremity strength to within normal limits during functional activities.   3. Independent with home exercise program.   4. Full return to functional activities with manageable complaints.  5. Patient to demonstrate improved posture and body mechanics.  6. Decrease pain by 75% during functional activities.    Plan       Continue POC established by PT, including: BLE/BUE strengthening, general conditioning, and improve safety with functional activities the require single leg stability.       Javier Maldonado, PT

## 2020-02-14 NOTE — PROGRESS NOTES
Occupational Therapy Daily Treatment Note     Date: 2/14/2020  Name: Deborah [place]  Clinic Number: [id number]    Therapy Diagnosis:   Encounter Diagnoses   Name Primary?    Poor fine motor skills     Impaired instrumental activities of daily living (IADL)     Decreased functional mobility and endurance     Muscle weakness of lower extremity     Impaired functional mobility, balance, and endurance     Physical deconditioning      Physician: Americo Albert NP  Physician Orders: Evaluate and treat  Medical Diagnosis: Heart transplant, 12/16/19  Evaluation Date: 1/31/2020  Plan of Care Expiration Period: 4/3/2020  Insurance Authorization period Expiration: 12/31/2020  Date of Return to MD: 2/11/2020 [facility] (Heart transplant)   Visit # / Visits Authorized: 1 / future visits pending     Time In: 9:00 AM  Time Out: 10:00 AM  Total Billable (one on one) Time: 55 minutes     Precautions: Standard, Immunosuppression and organ transplant    Subjective     Pt reports: No new c/o  Response to previous treatment:Lit well    Pain: 0/10    Objective      Deborah received therapeutic exercises for BUE's for 40 minutes including:  -Juxacisor 3'  -Isospheres 3'   -Coins 1 container  -Yellow putty molding 3', squeezes 10, 2P/lat pinches 2 logs ea     Deborah received therapeutic activities for 15 minutes including:  -Education re: adaptive equipment for ADL's and leisure activities       Home Exercises and Education Provided     Education provided:   - Upgraded HEP  - Progress towards goals     Written Home Exercises Provided: yes.  Exercises were reviewed and Deborah was able to demonstrate them prior to the end of the session.  Deborah demonstrated good  understanding of the HEP provided.   .   See EMR under Patient Instructions for exercises provided prior visit.        Assessment     Pt would continue to benefit from skilled OT to maximize BUE function.     Deborah is progressing  towards her goals  and there are no updates to goals at this time. Pt prognosis is Good.     Pt will continue to benefit from skilled outpatient occupational therapy to address the deficits listed in the problem list on initial evaluation provide pt/family education and to maximize pt's level of independence in the home and community environment.       Pt's spiritual, cultural and educational needs considered and pt agreeable to plan of care and goals.    Goals:  Short Term Goals: 3 weeks   ROM/Strength to perform the ADL's and functional activities listed below,    Pt will improve functional  strength needed for tasks to 35# in L hand.  Pt will improve functional  strength needed for tasks to 44# in R hand.  Pt will improve functional lateral pinch strength needed for tasks to 3# in L hand.  Pt will improve functional lateral pinch strength needed for tasks to 6# in R hand.  Pt will be Independent with HEP to improve strength and FM skills.      Long Term Goals: 6 weeks   ROM/Strength to perform the ADL's and functional activities listed below  Pt will improve functional  strength needed for tasks to 40# in L hand.  Pt will improve functional  strength needed for tasks to 48# in R hand.  Pt will improve functional lateral pinch strength needed for tasks to 4# in L hand.  Pt will improve functional lateral pinch strength needed for tasks to 8# in R hand.  Pt will perform functional standing activity for 15 minutes without sitting rest break.   G code self care CK.     Plan   Cont OT to address above goals.        NIK Russ OTR/L, CHT

## 2020-02-17 ENCOUNTER — LAB VISIT (OUTPATIENT)
Dept: LAB | Facility: HOSPITAL | Age: 50
End: 2020-02-17
Payer: COMMERCIAL

## 2020-02-17 ENCOUNTER — PATIENT MESSAGE (OUTPATIENT)
Dept: TRANSPLANT | Facility: CLINIC | Age: 50
End: 2020-02-17

## 2020-02-17 DIAGNOSIS — Z94.1 STATUS POST HEART TRANSPLANT: ICD-10-CM

## 2020-02-17 DIAGNOSIS — N28.9 RENAL INSUFFICIENCY: ICD-10-CM

## 2020-02-17 DIAGNOSIS — T86.298 OTHER COMPLICATION OF HEART TRANSPLANT: ICD-10-CM

## 2020-02-17 DIAGNOSIS — Z79.899 ENCOUNTER FOR LONG-TERM (CURRENT) USE OF MEDICATIONS: ICD-10-CM

## 2020-02-17 DIAGNOSIS — N18.4 CKD (CHRONIC KIDNEY DISEASE), STAGE IV: ICD-10-CM

## 2020-02-17 DIAGNOSIS — Z94.1 HEART REPLACED BY TRANSPLANT: ICD-10-CM

## 2020-02-17 LAB
ALBUMIN SERPL BCP-MCNC: 3.8 G/DL (ref 3.5–5.2)
ALBUMIN SERPL BCP-MCNC: 3.8 G/DL (ref 3.5–5.2)
ALP SERPL-CCNC: 66 U/L (ref 55–135)
ALT SERPL W/O P-5'-P-CCNC: 8 U/L (ref 10–44)
ANION GAP SERPL CALC-SCNC: 12 MMOL/L (ref 8–16)
ANION GAP SERPL CALC-SCNC: 12 MMOL/L (ref 8–16)
AST SERPL-CCNC: 10 U/L (ref 10–40)
BILIRUB SERPL-MCNC: 0.7 MG/DL (ref 0.1–1)
BNP SERPL-MCNC: 312 PG/ML (ref 0–99)
BUN SERPL-MCNC: 24 MG/DL (ref 6–20)
BUN SERPL-MCNC: 24 MG/DL (ref 6–20)
CALCIUM SERPL-MCNC: 9.2 MG/DL (ref 8.7–10.5)
CALCIUM SERPL-MCNC: 9.2 MG/DL (ref 8.7–10.5)
CHLORIDE SERPL-SCNC: 105 MMOL/L (ref 95–110)
CHLORIDE SERPL-SCNC: 105 MMOL/L (ref 95–110)
CO2 SERPL-SCNC: 23 MMOL/L (ref 23–29)
CO2 SERPL-SCNC: 23 MMOL/L (ref 23–29)
CREAT SERPL-MCNC: 1.7 MG/DL (ref 0.5–1.4)
CREAT SERPL-MCNC: 1.7 MG/DL (ref 0.5–1.4)
EST. GFR  (AFRICAN AMERICAN): 40 ML/MIN/1.73 M^2
EST. GFR  (AFRICAN AMERICAN): 40 ML/MIN/1.73 M^2
EST. GFR  (NON AFRICAN AMERICAN): 34.7 ML/MIN/1.73 M^2
EST. GFR  (NON AFRICAN AMERICAN): 34.7 ML/MIN/1.73 M^2
GLUCOSE SERPL-MCNC: 97 MG/DL (ref 70–110)
GLUCOSE SERPL-MCNC: 97 MG/DL (ref 70–110)
MAGNESIUM SERPL-MCNC: 1.6 MG/DL (ref 1.6–2.6)
PHOSPHATE SERPL-MCNC: 3.4 MG/DL (ref 2.7–4.5)
POTASSIUM SERPL-SCNC: 3.9 MMOL/L (ref 3.5–5.1)
POTASSIUM SERPL-SCNC: 3.9 MMOL/L (ref 3.5–5.1)
PROT SERPL-MCNC: 6.6 G/DL (ref 6–8.4)
SODIUM SERPL-SCNC: 140 MMOL/L (ref 136–145)
SODIUM SERPL-SCNC: 140 MMOL/L (ref 136–145)
TACROLIMUS BLD-MCNC: 7.5 NG/ML (ref 5–15)

## 2020-02-17 PROCEDURE — 83880 ASSAY OF NATRIURETIC PEPTIDE: CPT

## 2020-02-17 PROCEDURE — 80197 ASSAY OF TACROLIMUS: CPT

## 2020-02-17 PROCEDURE — 36415 COLL VENOUS BLD VENIPUNCTURE: CPT

## 2020-02-17 PROCEDURE — 86644 CMV ANTIBODY: CPT

## 2020-02-17 PROCEDURE — 80069 RENAL FUNCTION PANEL: CPT

## 2020-02-17 PROCEDURE — 80053 COMPREHEN METABOLIC PANEL: CPT

## 2020-02-17 PROCEDURE — 83735 ASSAY OF MAGNESIUM: CPT

## 2020-02-18 ENCOUNTER — TELEPHONE (OUTPATIENT)
Dept: VASCULAR SURGERY | Facility: CLINIC | Age: 50
End: 2020-02-18

## 2020-02-18 LAB — CMV IGG SERPL QL IA: NORMAL

## 2020-02-18 NOTE — TELEPHONE ENCOUNTER
----- Message from Olya Garcia RN sent at 2/18/2020 10:03 AM CST -----  Good morning,    Dr. Nuñez saw Ms. Navas on Thursday regarding swelling to her right groin (lymphocele), s/p heart transplant on December 16.  He drained the site and removed 180 mL of clear yellow fluid.  Within a few days, the swelling and fluid has returned.  He would like the pt to see a Vascular Surgeon regarding this.  Can you please contact the pt and schedule her to be seen in Vascular Surgery?    Thanks,    Olya

## 2020-02-19 ENCOUNTER — TELEPHONE (OUTPATIENT)
Dept: VASCULAR SURGERY | Facility: CLINIC | Age: 50
End: 2020-02-19

## 2020-02-19 ENCOUNTER — CLINICAL SUPPORT (OUTPATIENT)
Dept: REHABILITATION | Facility: HOSPITAL | Age: 50
End: 2020-02-19
Payer: COMMERCIAL

## 2020-02-19 DIAGNOSIS — R29.898 POOR FINE MOTOR SKILLS: ICD-10-CM

## 2020-02-19 DIAGNOSIS — T81.89XA LYMPHOCELE AFTER SURGICAL PROCEDURE: Primary | ICD-10-CM

## 2020-02-19 DIAGNOSIS — I89.8 LYMPHOCELE AFTER SURGICAL PROCEDURE: Primary | ICD-10-CM

## 2020-02-19 DIAGNOSIS — Z74.09 DECREASED FUNCTIONAL MOBILITY AND ENDURANCE: ICD-10-CM

## 2020-02-19 DIAGNOSIS — M62.81 MUSCLE WEAKNESS OF LOWER EXTREMITY: ICD-10-CM

## 2020-02-19 DIAGNOSIS — Z74.09 IMPAIRED FUNCTIONAL MOBILITY, BALANCE, AND ENDURANCE: ICD-10-CM

## 2020-02-19 DIAGNOSIS — Z78.9 IMPAIRED INSTRUMENTAL ACTIVITIES OF DAILY LIVING (IADL): ICD-10-CM

## 2020-02-19 DIAGNOSIS — R53.81 PHYSICAL DECONDITIONING: ICD-10-CM

## 2020-02-19 PROCEDURE — 97110 THERAPEUTIC EXERCISES: CPT | Mod: PO | Performed by: PHYSICAL THERAPIST

## 2020-02-19 PROCEDURE — 97110 THERAPEUTIC EXERCISES: CPT | Mod: PO

## 2020-02-19 NOTE — PROGRESS NOTES
Physical Therapy Daily Treatment Note     Name: Deborah Oliva [facility] Number: [id number]    Therapy Diagnosis:   No diagnosis found.  Physician: Americo Albert NP    Visit Date: 2/19/2020    Physician Orders: PT Eval and Treat deconditioned / weakness  Medical Diagnosis from Referral:   Z94.1 (ICD-10-CM) - Heart transplanted     Evaluation Date: 1/28/2020  Authorization Period Expiration: 1/21/2021  Plan of Care Expiration: 4/28/2020  Visit # / Visits authorized: 1/ 20 (plus 2 visit for the initial evaluation and first tx visit)    Time In: 1015  Time Out:1110  Total Billable Time: 55 minutes    Precautions: Standard and Fall    Subjective     Pt reports: she is doing better. Says that she does not feel as weak.      She was partially compliant with HEP.  Response to previous treatment: sore but got up and did quite a bit of walking.     Functional change: Felt like I was walking better and SOB was not as bad.     Pain: 0/10  Location: N/A    Objective     Deborah received therapeutic exercises to develop strength, endurance, ROM, flexibility, posture and core stabilization for 55 minutes including:    Pulse and O2 levels at pre, during and post tx session  93 pulse/91 O2 level pre  90 pulse/93 O2 level during  97 pulse/94 O2 level during  100pulse/  94 O2 level post    .BOLD INDICATES ACTIVITIES PERFORMED    UBE L2 for 6 min    Supine:  PPT with TA bracing 15x with   Hooklying marching with PPT and TA bracing 3x30 sec  Bridges x10 on heels  SLR  Alternate x10  SLR / arms alternate x5 =   4 count hor ab/ad w/scap prot/retrac  x15     Side-lying:   Clamshells with orange TB: 2x20 reps     Sitting:  Pallof press with orange TB: 2x12 reps   Incline bench press with 4 lbs dumb bells   Bicep curls with 3 lbs dumb bells x15   D2 diagonals 2 x15 3#  Shoulder press 2 x15   Shoulder circles 2  x15  LAQ   x25      Standing:   Marching hi-knee x15   Leg swings abd x15  Leg swings ext x15   Squatting x15      Glut dominant squats with UE support 2x7 reps  Step ups with 6 inch step x15 reps   Heel raises 2x15 reps with 3 sec holds  gastroc stretch 2x30 sec  Toe raises 2x15 reps with 3 sec holds  Rows with green TB: 2x10 reps  Shoulder extensions with orange TB: 2x10 reps    Recumbent bike L3 for 5 min      Home Exercises Provided and Patient Education Provided     Education provided: Pt was educated on HEP and all therapeutic exercises initiated during today's tx visit.       Written Home Exercises Provided: Patient instructed to cont prior HEP.  Exercises were reviewed and Deborah was able to demonstrate them prior to the end of the session.  Deborah demonstrated good  understanding of the education provided.     See EMR under Patient Instructions for exercises provided 1/31/2020.    Assessment     Routine performed and completed w/o difficulty. She did not encounter any SOB with added reps or increased step height. She is responding to the routine and seems to be progressing with activity tolerance. [place] and O2 sats maintained during the exercises.     Deborah is progressing well towards her goals.   Pt prognosis is Fair.     Pt will continue to benefit from skilled outpatient physical therapy to address the deficits listed in the problem list box on initial evaluation, provide pt/family education and to maximize pt's level of independence in the home and community environment.     Pt's spiritual, cultural and educational needs considered and pt agreeable to plan of care and goals.     Anticipated barriers to physical therapy:tolerance level     Short Term Goals (6 Weeks):      1.Pt to increase strength by a 1/2 grade of muscles test to allow for improvement in functional activities such as performing chores.  2.Pt to improve range of motion by 25% to allow for improved functional mobility to allow for improvement in IA DLS.   3.Pt to report compliance with HEP and demonstrate proper exercise technique to PT to  show competence with self management of condition.  4.Decrease pain by 25% during functional activities.     Long Term Goals (12 Weeks):      1. Increase ROM to allow improved joint biomechanics during functional activities.   2.Increase trunk and lower extremity strength to within normal limits during functional activities.   3. Independent with home exercise program.   4. Full return to functional activities with manageable complaints.  5. Patient to demonstrate improved posture and body mechanics.  6. Decrease pain by 75% during functional activities.    Plan       Continue POC established by PT, including: BLE/BUE strengthening, general conditioning, and improve safety with functional activities the require single leg stability.       Javier Maldonado, PT

## 2020-02-19 NOTE — PROGRESS NOTES
Occupational Therapy Daily Treatment Note     Date: 2/19/2020  Name: Deborah Oliva [place]  Clinic Number: [id number]    Therapy Diagnosis:   Encounter Diagnoses   Name Primary?    Poor fine motor skills     Impaired instrumental activities of daily living (IADL)     Decreased functional mobility and endurance     Muscle weakness of lower extremity     Impaired functional mobility, balance, and endurance     Physical deconditioning      Physician: Americo Albert NP    Physician Orders: Evaluate and treat  Medical Diagnosis: Heart transplant, 12/16/19  Evaluation Date: 1/31/2020  Plan of Care Expiration Period: 4/3/2020  Insurance Authorization period Expiration: 12/31/2020  Date of Return to MD: 2/11/2020 [facility] (Heart transplant)   Visit # / Visits Authorized: 1 / future visits pending     Time In: 11:00 AM  Time Out: 12:00 PM  Total Billable (one on one) Time: 55 minutes     Precautions: Standard, Immunosuppression and organ transplant    Subjective     Pt reports: She feels the right hand is working slightly better  Response to previous treatment:Lit well    Pain: 0/10    Objective   Deborah received therapeutic exercises for BUE's for 40 minutes including:  -Juxacisor 3'  -Isospheres 3'   -Coins 1 container  -Small red flexbar frowns/smiles, WE/WF 3x10         Strength: (FRENCH Dynamometer in lbs.) Average 3 trials, Position II:       Left Right   Rung II 30# 40#    -3 =      Pinch Strength (Measured in psi)       Left  Right    Key Pinch 4 +3 6 +2   3pt Pinch 4 +3 2 +2   2pt Pinch 2 +2 2 +2       Fine Motor Coordination: 9 Hole Peg Test  Left  Right       58 sec  61 sec   = -15sec          Home Exercises and Education Provided     Education provided:   - Progress towards goals     Written Home Exercises Provided: Patient instructed to cont prior HEP.  Exercises were reviewed and Deborah was able to demonstrate them prior to the end of the session.  Deborah demonstrated good  understanding  of the HEP provided.   .   See EMR under Patient Instructions for exercises provided prior visit.        Assessment     Pt would continue to benefit from skilled OT to maximize BUE function.     Deborah is progressing well towards her goals and there are no updates to goals at this time. Pt prognosis is Good.     Pt will continue to benefit from skilled outpatient occupational therapy to address the deficits listed in the problem list on initial evaluation provide pt/family education and to maximize pt's level of independence in the home and community environment.       Pt's spiritual, cultural and educational needs considered and pt agreeable to plan of care and goals.    Goals:  Short Term Goals: 3 weeks   ROM/Strength to perform the ADL's and functional activities listed below,    Pt will improve functional  strength needed for tasks to 35# in L hand. Progressing  Pt will improve functional  strength needed for tasks to 44# in R hand. Progressing  Pt will improve functional lateral pinch strength needed for tasks to 3# in L hand. Met  Pt will improve functional lateral pinch strength needed for tasks to 6# in R hand. Met  Pt will be Independent with HEP to improve strength and FM skills. Met     Long Term Goals: 6 weeks   ROM/Strength to perform the ADL's and functional activities listed below  Pt will improve functional  strength needed for tasks to 40# in L hand. Progressing  Pt will improve functional  strength needed for tasks to 48# in R hand. Progressing  Pt will improve functional lateral pinch strength needed for tasks to 4# in L hand.   Pt will improve functional lateral pinch strength needed for tasks to 8# in R hand.  Pt will perform functional standing activity for 15 minutes without sitting rest break.   G code self care CK.     Plan   Cont OT to address above goals.        NIK Russ OTR/L, CHT

## 2020-02-19 NOTE — TELEPHONE ENCOUNTER
----- Message from Carmen Rose MA sent at 2/18/2020  2:42 PM CST -----  I sent Rakan a message to find out what type of studies are needed for this pt. Once he respond I will let scheduled the pt. Thanks   ----- Message -----  From: Olya Garcia RN  Sent: 2/18/2020  10:03 AM CST  To: Yris Mallory NP, [facility] Staff, #    Good morning,    Dr. Nuñez saw Ms. Navas on Thursday regarding swelling to her right groin (lymphocele), s/p heart transplant on December 16.  He drained the site and removed 180 mL of clear yellow fluid.  Within a few days, the swelling and fluid has returned.  He would like the pt to see a Vascular Surgeon regarding this.  Can you please contact the pt and schedule her to be seen in Vascular Surgery?    Thanks,    Olya

## 2020-02-21 ENCOUNTER — CLINICAL SUPPORT (OUTPATIENT)
Dept: REHABILITATION | Facility: HOSPITAL | Age: 50
End: 2020-02-21
Payer: COMMERCIAL

## 2020-02-21 ENCOUNTER — TELEPHONE (OUTPATIENT)
Dept: HOME HEALTH SERVICES | Facility: HOSPITAL | Age: 50
End: 2020-02-21

## 2020-02-21 DIAGNOSIS — R29.898 POOR FINE MOTOR SKILLS: ICD-10-CM

## 2020-02-21 DIAGNOSIS — Z78.9 IMPAIRED INSTRUMENTAL ACTIVITIES OF DAILY LIVING (IADL): ICD-10-CM

## 2020-02-21 DIAGNOSIS — R53.81 PHYSICAL DECONDITIONING: ICD-10-CM

## 2020-02-21 DIAGNOSIS — Z74.09 DECREASED FUNCTIONAL MOBILITY AND ENDURANCE: ICD-10-CM

## 2020-02-21 DIAGNOSIS — Z74.09 IMPAIRED FUNCTIONAL MOBILITY, BALANCE, AND ENDURANCE: ICD-10-CM

## 2020-02-21 DIAGNOSIS — R53.81 PHYSICAL DECONDITIONING: Primary | ICD-10-CM

## 2020-02-21 DIAGNOSIS — M62.81 MUSCLE WEAKNESS OF LOWER EXTREMITY: ICD-10-CM

## 2020-02-21 PROCEDURE — 97110 THERAPEUTIC EXERCISES: CPT | Mod: PO

## 2020-02-21 PROCEDURE — 97110 THERAPEUTIC EXERCISES: CPT | Mod: PO,CQ

## 2020-02-21 NOTE — PROGRESS NOTES
Occupational Therapy Daily Treatment Note     Date: 2/21/2020  Name: Deborah Oliva [place]  Clinic Number: [id number]    Therapy Diagnosis:   Encounter Diagnoses   Name Primary?    Poor fine motor skills     Impaired instrumental activities of daily living (IADL)     Decreased functional mobility and endurance     Muscle weakness of lower extremity     Impaired functional mobility, balance, and endurance     Physical deconditioning      Physician: Americo Albert NP    Physician Orders: Evaluate and treat  Medical Diagnosis: Heart transplant, 12/16/19  Evaluation Date: 1/31/2020  Plan of Care Expiration Period: 4/3/2020  Insurance Authorization period Expiration: 12/31/2020  Date of Return to MD: 2/11/2020 Elliot (Heart transplant)   Visit # / Visits Authorized: 5 / 20     Time In: 9:00 AM  Time Out: 10:00 AM  Total Billable (one on one) Time: 55 minutes     Precautions: Standard, Immunosuppression and organ transplant    Subjective     Pt reports: No new c/o  Response to previous treatment:Lit well, increased /pinch strength    Pain: 0/10    Objective   Deborah received therapeutic exercises for BUE's for 55 minutes including:  -Juxacisor 3'  -Isospheres 3'   -Coins 1 container  -Small red flexbar frowns/smiles, WE/WF/UD/RD 3x10  -Nuts/bolts 5     Home Exercises and Education Provided     Education provided:   - Progress towards goals     Written Home Exercises Provided: Patient instructed to cont prior HEP.  Exercises were reviewed and Deborah was able to demonstrate them prior to the end of the session.  Deborah demonstrated good  understanding of the HEP provided.   .   See EMR under Patient Instructions for exercises provided prior visit.        Assessment     Pt would continue to benefit from skilled OT to maximize BUE function.     Deborah is progressing well towards her goals and there are no updates to goals at this time. Pt prognosis is Good.     Pt will continue to benefit from  skilled outpatient occupational therapy to address the deficits listed in the problem list on initial evaluation provide pt/family education and to maximize pt's level of independence in the home and community environment.       Pt's spiritual, cultural and educational needs considered and pt agreeable to plan of care and goals.    Goals:  Short Term Goals: 3 weeks   ROM/Strength to perform the ADL's and functional activities listed below,    Pt will improve functional  strength needed for tasks to 35# in L hand. Progressing  Pt will improve functional  strength needed for tasks to 44# in R hand. Progressing  Pt will improve functional lateral pinch strength needed for tasks to 3# in L hand. Met  Pt will improve functional lateral pinch strength needed for tasks to 6# in R hand. Met  Pt will be Independent with HEP to improve strength and FM skills. Met     Long Term Goals: 6 weeks   ROM/Strength to perform the ADL's and functional activities listed below  Pt will improve functional  strength needed for tasks to 40# in L hand. Progressing  Pt will improve functional  strength needed for tasks to 48# in R hand. Progressing  Pt will improve functional lateral pinch strength needed for tasks to 4# in L hand. Progressing  Pt will improve functional lateral pinch strength needed for tasks to 8# in R hand. Progressing  Pt will perform functional standing activity for 15 minutes without sitting rest break. Progressing  G code self care CK.  Progressing    Plan   Cont OT to address above goals.        NIK Russ OTR/L, CHT

## 2020-02-21 NOTE — PROGRESS NOTES
Physical Therapy Daily Treatment Note     Name: Deborah Oliva [facility] Number: [id number]    Therapy Diagnosis:   Encounter Diagnosis   Name Primary?    Physical deconditioning Yes     Physician: Americo Albert NP    Visit Date: 2/21/2020    Physician Orders: PT Eval and Treat deconditioned / weakness  Medical Diagnosis from Referral:   Z94.1 (ICD-10-CM) - Heart transplanted     Evaluation Date: 1/28/2020  Authorization Period Expiration: 1/21/2021  Plan of Care Expiration: 4/28/2020  Visit # / Visits authorized: 5/ 20 (plus 2 visit for the initial evaluation and first tx visit)    Time In: 1001   Time Out:1100   Total Billable Time: 59 minutes    Precautions: Standard and Fall    Subjective     Pt reports: no pain and that she is feeling pretty good.      She was partially compliant with HEP.  Response to previous treatment: sore but got up and did quite a bit of walking.     Functional change: Felt like I was walking better and SOB was not as bad.     Pain: 0/10  Location: N/A    Objective     Deborah received therapeutic exercises to develop strength, endurance, ROM, flexibility, posture and core stabilization for 59 minutes including:    Pulse and O2 levels at pre, during and post tx session  92 pulse/94 O2 level pre    89 pulse/94 O2 level during    104 pulse/  94 O2 level post    .BOLD INDICATES ACTIVITIES PERFORMED    UBE L2 for 6 min    Supine:  PPT with TA bracing 15x with   Hooklying marching with PPT and TA bracing 3x30 sec  Bridges 3x10 on heels  SLR  Alternate 2x10  SLR / arms alternate x 10   4 count hor ab/ad w/scap prot/retrac  X 15     Side-lying:   Clamshells with orange TB: 2x20 reps     Sitting:  Pallof press with orange TB: 2x12 reps   Incline bench press with 4 lbs dumb bells   Bicep curls with 5 lbs dumb bells 2x10   D2 diagonals 1x10 3#, 1x10 no weight   Shoulder press 2 x15   Shoulder circles 2  x15  LAQ  X 30       Standing:   Marching hi-knee x15   Leg swings abd 2x15  Leg  swings ext 2x15   Squatting x15     Glut dominant squats with UE support 3x5 reps  Step ups at bottom step on staircase x15 reps   Heel raises 2x15 reps with 3 sec holds  gastroc stretch 2x30 sec  Toe raises 2x15 reps with 3 sec holds  Rows with green TB: 2x10 reps  Shoulder extensions with orange TB: 2x10 reps    Recumbent bike L3 for 5 min      Home Exercises Provided and Patient Education Provided     Education provided: Pt was educated on HEP and all therapeutic exercises initiated during today's tx visit.       Written Home Exercises Provided: Patient instructed to cont prior HEP.  Exercises were reviewed and Deborah was able to demonstrate them prior to the end of the session.  Deborah demonstrated good  understanding of the education provided.     See EMR under Patient Instructions for exercises provided 1/31/2020.    Assessment     Patient tolerated treatment well today with no onset of adverse effects. OK and O2 sats continue to remain maintained throughout treatment.     Deborah is progressing well towards her goals.   Pt prognosis is Fair.     Pt will continue to benefit from skilled outpatient physical therapy to address the deficits listed in the problem list box on initial evaluation, provide pt/family education and to maximize pt's level of independence in the home and community environment.     Pt's spiritual, cultural and educational needs considered and pt agreeable to plan of care and goals.     Anticipated barriers to physical therapy:tolerance level     Short Term Goals (6 Weeks):      1.Pt to increase strength by a 1/2 grade of muscles test to allow for improvement in functional activities such as performing chores.  2.Pt to improve range of motion by 25% to allow for improved functional mobility to allow for improvement in IA DLS.   3.Pt to report compliance with HEP and demonstrate proper exercise technique to PT to show competence with self management of condition.  4.Decrease pain by  25% during functional activities.     Long Term Goals (12 Weeks):      1. Increase ROM to allow improved joint biomechanics during functional activities.   2.Increase trunk and lower extremity strength to within normal limits during functional activities.   3. Independent with home exercise program.   4. Full return to functional activities with manageable complaints.  5. Patient to demonstrate improved posture and body mechanics.  6. Decrease pain by 75% during functional activities.    Plan       Continue POC established by PT, including: BLE/BUE strengthening, general conditioning, and improve safety with functional activities the require single leg stability.       Wendy Springer, PTA

## 2020-02-24 ENCOUNTER — OFFICE VISIT (OUTPATIENT)
Dept: VASCULAR SURGERY | Facility: CLINIC | Age: 50
End: 2020-02-24
Attending: SURGERY
Payer: COMMERCIAL

## 2020-02-24 ENCOUNTER — LAB VISIT (OUTPATIENT)
Dept: LAB | Facility: HOSPITAL | Age: 50
End: 2020-02-24
Payer: COMMERCIAL

## 2020-02-24 ENCOUNTER — OFFICE VISIT (OUTPATIENT)
Dept: TRANSPLANT | Facility: CLINIC | Age: 50
End: 2020-02-24
Payer: COMMERCIAL

## 2020-02-24 VITALS
TEMPERATURE: 99 F | HEIGHT: 67 IN | BODY MASS INDEX: 33.57 KG/M2 | WEIGHT: 213.88 LBS | SYSTOLIC BLOOD PRESSURE: 126 MMHG | DIASTOLIC BLOOD PRESSURE: 73 MMHG | HEART RATE: 77 BPM

## 2020-02-24 VITALS
DIASTOLIC BLOOD PRESSURE: 66 MMHG | SYSTOLIC BLOOD PRESSURE: 136 MMHG | HEIGHT: 67 IN | HEART RATE: 91 BPM | BODY MASS INDEX: 32.96 KG/M2 | WEIGHT: 210 LBS

## 2020-02-24 DIAGNOSIS — N28.9 RENAL INSUFFICIENCY: ICD-10-CM

## 2020-02-24 DIAGNOSIS — T86.298 OTHER COMPLICATION OF HEART TRANSPLANT: ICD-10-CM

## 2020-02-24 DIAGNOSIS — N18.4 CKD (CHRONIC KIDNEY DISEASE), STAGE IV: ICD-10-CM

## 2020-02-24 DIAGNOSIS — Z79.899 ENCOUNTER FOR LONG-TERM (CURRENT) USE OF MEDICATIONS: ICD-10-CM

## 2020-02-24 DIAGNOSIS — N18.30 STAGE 3 CHRONIC KIDNEY DISEASE: ICD-10-CM

## 2020-02-24 DIAGNOSIS — T38.0X5S ADVERSE EFFECT OF ADRENAL CORTICAL STEROIDS, SEQUELA: ICD-10-CM

## 2020-02-24 DIAGNOSIS — D86.85 CARDIAC SARCOIDOSIS: Primary | ICD-10-CM

## 2020-02-24 DIAGNOSIS — Z94.1 STATUS POST HEART TRANSPLANT: ICD-10-CM

## 2020-02-24 DIAGNOSIS — I89.8 LYMPHOCELE AFTER SURGICAL PROCEDURE: ICD-10-CM

## 2020-02-24 DIAGNOSIS — E78.2 MIXED HYPERLIPIDEMIA: ICD-10-CM

## 2020-02-24 DIAGNOSIS — T81.89XA LYMPHOCELE AFTER SURGICAL PROCEDURE: ICD-10-CM

## 2020-02-24 LAB
ALBUMIN SERPL BCP-MCNC: 4.2 G/DL (ref 3.5–5.2)
ALBUMIN SERPL BCP-MCNC: 4.2 G/DL (ref 3.5–5.2)
ALP SERPL-CCNC: 73 U/L (ref 55–135)
ALT SERPL W/O P-5'-P-CCNC: 12 U/L (ref 10–44)
ANION GAP SERPL CALC-SCNC: 15 MMOL/L (ref 8–16)
ANION GAP SERPL CALC-SCNC: 15 MMOL/L (ref 8–16)
AST SERPL-CCNC: 14 U/L (ref 10–40)
BASOPHILS # BLD AUTO: 0.04 K/UL (ref 0–0.2)
BASOPHILS NFR BLD: 1.3 % (ref 0–1.9)
BILIRUB SERPL-MCNC: 0.5 MG/DL (ref 0.1–1)
BNP SERPL-MCNC: 278 PG/ML (ref 0–99)
BUN SERPL-MCNC: 26 MG/DL (ref 6–20)
BUN SERPL-MCNC: 26 MG/DL (ref 6–20)
CALCIUM SERPL-MCNC: 9.9 MG/DL (ref 8.7–10.5)
CALCIUM SERPL-MCNC: 9.9 MG/DL (ref 8.7–10.5)
CHLORIDE SERPL-SCNC: 105 MMOL/L (ref 95–110)
CHLORIDE SERPL-SCNC: 105 MMOL/L (ref 95–110)
CHOLEST SERPL-MCNC: 168 MG/DL (ref 120–199)
CHOLEST/HDLC SERPL: 2.2 {RATIO} (ref 2–5)
CO2 SERPL-SCNC: 22 MMOL/L (ref 23–29)
CO2 SERPL-SCNC: 22 MMOL/L (ref 23–29)
CREAT SERPL-MCNC: 1.9 MG/DL (ref 0.5–1.4)
CREAT SERPL-MCNC: 1.9 MG/DL (ref 0.5–1.4)
DIFFERENTIAL METHOD: ABNORMAL
EOSINOPHIL # BLD AUTO: 0.2 K/UL (ref 0–0.5)
EOSINOPHIL NFR BLD: 5.7 % (ref 0–8)
ERYTHROCYTE [DISTWIDTH] IN BLOOD BY AUTOMATED COUNT: 17.4 % (ref 11.5–14.5)
EST. GFR  (AFRICAN AMERICAN): 34.9 ML/MIN/1.73 M^2
EST. GFR  (AFRICAN AMERICAN): 34.9 ML/MIN/1.73 M^2
EST. GFR  (NON AFRICAN AMERICAN): 30.3 ML/MIN/1.73 M^2
EST. GFR  (NON AFRICAN AMERICAN): 30.3 ML/MIN/1.73 M^2
GLUCOSE SERPL-MCNC: 104 MG/DL (ref 70–110)
GLUCOSE SERPL-MCNC: 104 MG/DL (ref 70–110)
HCT VFR BLD AUTO: 31.2 % (ref 37–48.5)
HDLC SERPL-MCNC: 76 MG/DL (ref 40–75)
HDLC SERPL: 45.2 % (ref 20–50)
HGB BLD-MCNC: 8.9 G/DL (ref 12–16)
IMM GRANULOCYTES # BLD AUTO: 0.08 K/UL (ref 0–0.04)
IMM GRANULOCYTES NFR BLD AUTO: 2.7 % (ref 0–0.5)
LDLC SERPL CALC-MCNC: 66 MG/DL (ref 63–159)
LYMPHOCYTES # BLD AUTO: 0.5 K/UL (ref 1–4.8)
LYMPHOCYTES NFR BLD: 15 % (ref 18–48)
MAGNESIUM SERPL-MCNC: 1.8 MG/DL (ref 1.6–2.6)
MCH RBC QN AUTO: 26.3 PG (ref 27–31)
MCHC RBC AUTO-ENTMCNC: 28.5 G/DL (ref 32–36)
MCV RBC AUTO: 92 FL (ref 82–98)
MONOCYTES # BLD AUTO: 0.3 K/UL (ref 0.3–1)
MONOCYTES NFR BLD: 8.3 % (ref 4–15)
NEUTROPHILS # BLD AUTO: 2 K/UL (ref 1.8–7.7)
NEUTROPHILS NFR BLD: 67 % (ref 38–73)
NONHDLC SERPL-MCNC: 92 MG/DL
NRBC BLD-RTO: 0 /100 WBC
PHOSPHATE SERPL-MCNC: 3.9 MG/DL (ref 2.7–4.5)
PLATELET # BLD AUTO: 331 K/UL (ref 150–350)
PMV BLD AUTO: 9.1 FL (ref 9.2–12.9)
POTASSIUM SERPL-SCNC: 3.6 MMOL/L (ref 3.5–5.1)
POTASSIUM SERPL-SCNC: 3.6 MMOL/L (ref 3.5–5.1)
PROT SERPL-MCNC: 7.2 G/DL (ref 6–8.4)
RBC # BLD AUTO: 3.39 M/UL (ref 4–5.4)
SODIUM SERPL-SCNC: 142 MMOL/L (ref 136–145)
SODIUM SERPL-SCNC: 142 MMOL/L (ref 136–145)
TACROLIMUS BLD-MCNC: 9.6 NG/ML (ref 5–15)
TRIGL SERPL-MCNC: 130 MG/DL (ref 30–150)
WBC # BLD AUTO: 3 K/UL (ref 3.9–12.7)

## 2020-02-24 PROCEDURE — 3074F SYST BP LT 130 MM HG: CPT | Mod: CPTII,S$GLB,, | Performed by: SURGERY

## 2020-02-24 PROCEDURE — 83880 ASSAY OF NATRIURETIC PEPTIDE: CPT

## 2020-02-24 PROCEDURE — 99999 PR PBB SHADOW E&M-EST. PATIENT-LVL IV: ICD-10-PCS | Mod: PBBFAC,,, | Performed by: SURGERY

## 2020-02-24 PROCEDURE — 3078F DIAST BP <80 MM HG: CPT | Mod: CPTII,S$GLB,, | Performed by: SURGERY

## 2020-02-24 PROCEDURE — 3008F PR BODY MASS INDEX (BMI) DOCUMENTED: ICD-10-PCS | Mod: CPTII,S$GLB,, | Performed by: SURGERY

## 2020-02-24 PROCEDURE — 99215 PR OFFICE/OUTPT VISIT, EST, LEVL V, 40-54 MIN: ICD-10-PCS | Mod: S$GLB,,, | Performed by: INTERNAL MEDICINE

## 2020-02-24 PROCEDURE — 80053 COMPREHEN METABOLIC PANEL: CPT

## 2020-02-24 PROCEDURE — 3074F PR MOST RECENT SYSTOLIC BLOOD PRESSURE < 130 MM HG: ICD-10-PCS | Mod: CPTII,S$GLB,, | Performed by: SURGERY

## 2020-02-24 PROCEDURE — 99215 OFFICE O/P EST HI 40 MIN: CPT | Mod: S$GLB,,, | Performed by: INTERNAL MEDICINE

## 2020-02-24 PROCEDURE — 3078F PR MOST RECENT DIASTOLIC BLOOD PRESSURE < 80 MM HG: ICD-10-PCS | Mod: CPTII,S$GLB,, | Performed by: SURGERY

## 2020-02-24 PROCEDURE — 3078F DIAST BP <80 MM HG: CPT | Mod: CPTII,S$GLB,, | Performed by: INTERNAL MEDICINE

## 2020-02-24 PROCEDURE — 99999 PR PBB SHADOW E&M-EST. PATIENT-LVL III: CPT | Mod: PBBFAC,,, | Performed by: INTERNAL MEDICINE

## 2020-02-24 PROCEDURE — 3008F BODY MASS INDEX DOCD: CPT | Mod: CPTII,S$GLB,, | Performed by: INTERNAL MEDICINE

## 2020-02-24 PROCEDURE — 3075F PR MOST RECENT SYSTOLIC BLOOD PRESS GE 130-139MM HG: ICD-10-PCS | Mod: CPTII,S$GLB,, | Performed by: INTERNAL MEDICINE

## 2020-02-24 PROCEDURE — 80197 ASSAY OF TACROLIMUS: CPT

## 2020-02-24 PROCEDURE — 99204 OFFICE O/P NEW MOD 45 MIN: CPT | Mod: S$GLB,,, | Performed by: SURGERY

## 2020-02-24 PROCEDURE — 99999 PR PBB SHADOW E&M-EST. PATIENT-LVL III: ICD-10-PCS | Mod: PBBFAC,,, | Performed by: INTERNAL MEDICINE

## 2020-02-24 PROCEDURE — 3075F SYST BP GE 130 - 139MM HG: CPT | Mod: CPTII,S$GLB,, | Performed by: INTERNAL MEDICINE

## 2020-02-24 PROCEDURE — 80061 LIPID PANEL: CPT

## 2020-02-24 PROCEDURE — 99204 PR OFFICE/OUTPT VISIT, NEW, LEVL IV, 45-59 MIN: ICD-10-PCS | Mod: S$GLB,,, | Performed by: SURGERY

## 2020-02-24 PROCEDURE — 80069 RENAL FUNCTION PANEL: CPT

## 2020-02-24 PROCEDURE — 85025 COMPLETE CBC W/AUTO DIFF WBC: CPT

## 2020-02-24 PROCEDURE — 83735 ASSAY OF MAGNESIUM: CPT

## 2020-02-24 PROCEDURE — 99999 PR PBB SHADOW E&M-EST. PATIENT-LVL IV: CPT | Mod: PBBFAC,,, | Performed by: SURGERY

## 2020-02-24 PROCEDURE — 36415 COLL VENOUS BLD VENIPUNCTURE: CPT

## 2020-02-24 PROCEDURE — 3008F PR BODY MASS INDEX (BMI) DOCUMENTED: ICD-10-PCS | Mod: CPTII,S$GLB,, | Performed by: INTERNAL MEDICINE

## 2020-02-24 PROCEDURE — 3008F BODY MASS INDEX DOCD: CPT | Mod: CPTII,S$GLB,, | Performed by: SURGERY

## 2020-02-24 PROCEDURE — 3078F PR MOST RECENT DIASTOLIC BLOOD PRESSURE < 80 MM HG: ICD-10-PCS | Mod: CPTII,S$GLB,, | Performed by: INTERNAL MEDICINE

## 2020-02-24 NOTE — LETTER
February 24, 2020        Joaquin Del Toro  [address]  [address]  [place] [facility] [place] [facility] [address]  Phone: [phone]  Fax: [phone]             [facility] [address]  [facility] [address]  Phone: [phone]   Patient: Deborah Navas   MR Number: [id number]   YOB: 1970   Date of Visit: 2/24/2020       Dear Dr. Joaquin Del Toro    Thank you for referring Deborah Navas to me for evaluation. Attached you will find relevant portions of my assessment and plan of care.    If you have questions, please do not hesitate to call me. I look forward to following Deborah Navas along with you.    Sincerely,    Johny Zarate MD    Enclosure    If you would like to receive this communication electronically, please contact [email or web address] or [phone] to request [employer] access.    [employer] is a tool which provides read-only access to select patient information with whom you have a relationship. Its easy to use and provides real time access to review your patients record including encounter summaries, notes, results, and demographic information.    If you feel you have received this communication in error or would no longer like to receive these types of communications, please e-mail [email or web address]

## 2020-02-24 NOTE — PROGRESS NOTES
VASCULAR SURGERY NOTE    Patient ID: Deborah Navas is a 49 y.o. female.    I. HISTORY     Chief Complaint: Lymphocele     HPI: Deborah Navas is a 49 y.o. female who is here today for new patient initial appointment. Patient had a heart transplant on 12/16/2020 due to restrictive cardiomyopathy. Since 2 weeks after surgery patient developed inflammation and edema of the right inguinal area. Dr Torres drained the collection a week ago but the fluid re-acommulated in 48 hours. Patient had an US that showed Persistent complex fluid collections in the subcutaneous tissues of the right groin deep to a surgical incision, favored to represent subcutaneous lymphoceles. No pain, numbness in the area, no redness, swelling or signs of infection. Now in clinic to discuss need for intervention.       Past Medical History:   Diagnosis Date    Fractures     History of ventricular fibrillation 9/4/2019    History of ventricular tachycardia 9/4/2019    Hyperlipidemia     Migraine     Multinodular goiter 9/5/2019    Osteoarthritis     Restrictive cardiomyopathy post heart transplant         Past Surgical History:   Procedure Laterality Date    BACK SURGERY      2007    BIOPSY WITH ULTRASOUND GUIDANCE N/A 12/31/2019    Procedure: BIOPSY, WITH US GUIDANCE;  Surgeon: Eric Antunez Jr., MD;  Location: [facility] CATH LAB;  Service: Cardiology;  Laterality: N/A;    BIOPSY WITH ULTRASOUND GUIDANCE N/A 1/7/2020    Procedure: BIOPSY, WITH US GUIDANCE;  Surgeon: Renetta Chaudhari MD;  Location: [facility] CATH LAB;  Service: Cardiology;  Laterality: N/A;    BIOPSY WITH ULTRASOUND GUIDANCE N/A 1/14/2020    Procedure: BIOPSY, WITH US GUIDANCE;  Surgeon: Renetta Chaudhari MD;  Location: [facility] CATH LAB;  Service: Cardiology;  Laterality: N/A;    BIOPSY WITH ULTRASOUND GUIDANCE N/A 1/28/2020    Procedure: BIOPSY, WITH US GUIDANCE;  Surgeon: Jolene Lua MD;  Location: [facility] CATH LAB;  Service: Cardiology;  Laterality: N/A;     BIOPSY WITH ULTRASOUND GUIDANCE N/A 2/11/2020    Procedure: BIOPSY, WITH US GUIDANCE;  Surgeon: Renetta Chaudhari MD;  Location: [facility] CATH LAB;  Service: Cardiology;  Laterality: N/A;    BONE GRAFT Left     from Left hip to Left FA    eardrum reconstruction  1980    ELBOW SURGERY Left [id number]    FOREARM FRACTURE SURGERY Bilateral [id number]    multiple surgeries    HEART TRANSPLANT N/A 12/16/2019    Procedure: TRANSPLANT, HEART;  Surgeon: Talha Nuñez MD;  [facility];  Service: Cardiothoracic;  Laterality: N/A;    INSERTION OF GRAFT TO PERICARDIUM  9/10/2019    Procedure: INSERTION, GRAFT, PERICARDIUM;  Surgeon: Shar Walden MD;  Location: [facility] OR [address] [place] [address];  Service: Cardiovascular;;    INSERTION OF IMPLANTABLE CARDIOVERTER-DEFIBRILLATOR (ICD) GENERATOR WITH TWO EXISTING LEADS      INSERTION OF PACEMAKER Left     LEFT VENTRICULAR ASSIST DEVICE N/A 9/10/2019    Procedure: INSERTION-LEFT VENTRICULAR ASSIST DEVICE;  Surgeon: Shar Walden MD;  Location: [facility] OR [address] [place] [address];  Service: Cardiovascular;  Laterality: N/A;  DT HM3     LUMBAR FUSION  2007    L4-L5    RIGHT HEART CATHETERIZATION Right 9/4/2019    Procedure: INSERTION, CATHETER, RIGHT HEART;  Surgeon: Vi Bryant MD;  Location: [facility] CATH LAB;  Service: Cardiology;  Laterality: Right;    RIGHT HEART CATHETERIZATION N/A 11/18/2019    Procedure: INSERTION, CATHETER, RIGHT HEART;  Surgeon: Eric Antunez Jr., MD;  Location: [facility] CATH LAB;  Service: Cardiology;  Laterality: N/A;    RIGHT HEART CATHETERIZATION Right 12/31/2019    Procedure: INSERTION, CATHETER, RIGHT HEART;  Surgeon: Eric Antunez Jr., MD;  Location: [facility] CATH LAB;  Service: Cardiology;  Laterality: Right;    RIGHT HEART CATHETERIZATION Right 1/7/2020    Procedure: INSERTION, CATHETER, RIGHT HEART;  Surgeon: Renetta Chaudhari MD;  Location: [facility] CATH LAB;  Service: Cardiology;  Laterality: Right;    SINUS SURGERY Right 1994    with lymph nodes     STERNAL WOUND CLOSURE  9/10/2019    Procedure: CLOSURE, WOUND, STERNUM;  Surgeon: Shar Walden MD;  [facility];  Service: Cardiovascular;;    TEMPOROMANDIBULAR JOINT SURGERY Right 1988    TONSILLECTOMY      TREATMENT OF CARDIAC ARRHYTHMIA N/A 9/24/2019    Procedure: CARDIOVERSION;  Surgeon: Moe Barrera MD;  Location: [facility] EP LAB;  Service: Cardiology;  Laterality: N/A;  AF, DCCV/NADINE, anes, DM, Rm 3073    TYMPANOSTOMY TUBE PLACEMENT  1971- 1979    multiple tube placements       Social History     Tobacco Use   Smoking Status Never Smoker   Smokeless Tobacco Never Used        Review of Systems   Constitution: Negative for chills and decreased appetite.   HENT: Negative for ear discharge and ear pain.    Eyes: Negative for blurred vision and discharge.   Cardiovascular: Negative for chest pain and claudication.   Respiratory: Negative for cough and shortness of breath.    Hematologic/Lymphatic: Negative for adenopathy and bleeding problem.   Musculoskeletal: Negative for arthritis and back pain.   Gastrointestinal: Negative for bloating and abdominal pain.         II. PHYSICAL EXAM     Physical Exam   Constitutional: She is oriented to person, place, and time. She appears well-developed.   Neck: Normal range of motion.   Cardiovascular: Normal rate, regular rhythm and intact distal pulses.   Pulmonary/Chest: Effort normal and breath sounds normal.   Abdominal: Soft. Bowel sounds are normal.   Musculoskeletal: Normal range of motion. She exhibits no edema.   Fluid collection in the right inguinal area, 7cm, mobile, nontender, above previous incision    Neurological: She is alert and oriented to person, place, and time.   longitudinal scar over R groin well-healed    III. ASSESSMENT & PLAN (MEDICAL DECISION MAKING)     1. Lymphocele after surgical procedure        Imaging Results:  Ultrasound:   Redemonstration of 2 complex fluid collections with internal septations and debris located in the  subcutaneous tissues of the right groin which are deep to a surgical incision.  The larger of the 2 lesions measures 8.5 x 5.9 x 8.8 cm (previously 10.9 x 5.3 x 8.5 cm) with a focal area of communication identified connecting to the smaller fluid collection also with internal septations measuring 4.2 x 2.2 x 3.3 cm (previously 4.7 x 2.4 x 4.3 cm).      Assessment/Diagnosis and Plan:  49 y.o. female s/p heart transplant 12/31/20 now with right grion lymphocele/seroma at right groin open cannulation site not improved after percutaneous drainage.    - Patient is high risk for surgical complications at the time due to risk of infection and wound complications shortly after induction from heart transplant. Will wait until 3 months post-op when skin/wound have reached maximum strength/healing and perform vascular groin US to reassess and plan surgical resection  - Patient was advised to wear compression socks 15-20mmHg.   - Plan was discussed with patient and mother, questions were answered.     NYDIA Bledsoe II, MD, RPVI  Vascular Surgeon  [facility]

## 2020-02-24 NOTE — PROGRESS NOTES
Subjective:   Ms. Navas is a 49 y.o. year old White female who received a donation after brain death heart transplant on 12/16/19.      CMV status:   Donor: +  Recipient: -    HPI     Recently discharged from the hospital after heart transplantation. Renal faliure and tremors are issues.    Nausea is better. Doing better off terbutaline     Prograf 2/3 mg  Cell Cept 500 mg bid, Prednisone 5 mg    Lymphocele      HPI: 50yo F s/p OHTx on 12/16/2019 for NICM/ HFrEF with HM3 on 9/2019, removed at time of transplant, HLD, obesity, and OA, who presented for worsening nausea and continued dyspnea on exertion. The pathology of native heart revealed non-caseating granulomas typical of sarcoidosis. Was recently admitted with similar symptoms/GLO in which she was diuresed and BP medications adjusted with improvement in renal function.  RHC on 1/7 with RA 12, wedge 15 and high CO/CI. TTE revealed LVEF 65% and normal RV function, mild MR, mod-sev TR, IVC not seen; no pericardial effusion appreciated. She also had significant tremors on admission and theophylline levels were found to be severely elevated so it was discontinued, with some improvement. EMB from 12/31 and 1/7 were both negative for Ab or cell-mediated rejection. She states that she did OK when initially discharged but has been getting progressively more nauseous over the last few days. Denies any sick contacts. No diarrhea. Vomiting occasionally but mostly dry heaves. She also still complains of dyspnea with minimal exertion which has not imrpoved since surgery.        Hospital Course: Pt was admitted and gently diuresed with IVP Lasix. In addition, MMF dose was decreased and Bactrim was stopped. Her nausea improved rapidly and she continued to work with PT/OT. She was transitioned to terbutaline from theophylline which she tolerated well. Her dyspnea improved as well throughout stay. She was given dose of pentamidine with plans to start dapsone as an outpt. She  was discharged home in good condition with plans to f/u with biopsy tomorrow.         January 28 2020    · HARSH Bx Study.  · Normal left ventricular systolic function. The estimated ejection fraction is 63%.  · Septal wall has abnormal motion.  · Indeterminate left ventricular diastolic function.  · Mild right ventricular enlargement.  · Low normal right ventricular systolic function.  · Mild mitral regurgitation.  · Mild to moderate tricuspid regurgitation.  · Mild pulmonic regurgitation.  · Intermediate central venous pressure (8 mmHg).  · The estimated PA systolic pressure is 44 mmHg.  · Pulmonary hypertension present      · Post cardiac transplantation study - OHTx 12/16/19  · Normal left ventricular systolic function. The estimated ejection fraction is 65%.  · Normal right ventricular systolic function.  · Normal LV diastolic function.  · Mild tricuspid regurgitation.  · The estimated PA systolic pressure is 35 mmHg.  · Normal central venous pressure (3 mmHg).  · No evident complications following endomyocardial biopsy.      Hospital Course: She was started on IV diuretic and low dobutamine due to concern for RV failure. Repeat RHC on 1/7 with upper normal filling pressures (RA 12, wedge 15) and high CO/CI. TTE revealed LVEF 65% and normal RV function, mild MR, mod-sev TR, IVC not seen; no pericardial effusion appreciated. She was switched to PO furosemide and her renal function improved slightly (last creat 2.3, from 3.2). She also had significant tremors on admission and theophylline levels were found to be severely elevated so it was discontinued, with some improvement. Tacrolimus dose was adjusted according to levels for a goal of 8, last level today is 5.6. BP was above goal and she was started on amlodipine and hydralazine with good response. EMB from 12/31 and 1/7 were both negative for Ab or cell-mediated rejection. There was also concern for increasing size of R groin hematoma that was first noted  post-transplant, CTS evaluated patient and recommended conservative management and observation. Patient is currently stable to be discharged home and follow-up as outpatient; she has an appointment for her next EMB on 1/14/2020          0 Review of Systems   Constitution: Negative for chills, decreased appetite, diaphoresis, fever, malaise/fatigue, night sweats, weight gain and weight loss.   Cardiovascular: Negative for chest pain, claudication, cyanosis, dyspnea on exertion, irregular heartbeat, leg swelling, near-syncope, orthopnea and palpitations.   Respiratory: Negative for cough, hemoptysis, shortness of breath, sleep disturbances due to breathing, snoring, sputum production and wheezing.    Gastrointestinal: Negative for abdominal pain and diarrhea.   Neurological: Negative for weakness.       Objective:   Blood pressure 136/66, pulse 91.body mass index is unknown because there is no height or weight on file.    Physical Exam   Constitutional: She is oriented to person, place, and time. She appears well-developed and well-nourished.   HENT:   Head: Normocephalic and atraumatic.   Eyes: Pupils are equal, round, and reactive to light. Conjunctivae and EOM are normal.   Neck: Neck supple. No JVD present. No tracheal deviation present. No thyromegaly present.   Cardiovascular: Normal rate, regular rhythm and normal heart sounds. Exam reveals no gallop and no friction rub.   No murmur heard.  Pulmonary/Chest: Effort normal and breath sounds normal. No respiratory distress. She has no wheezes. She has no rales. She exhibits no tenderness.   Abdominal: Soft. Bowel sounds are normal. She exhibits no distension and no mass. There is no tenderness. There is no rebound and no guarding.   Musculoskeletal: Normal range of motion. She exhibits no edema or tenderness.   Lymphadenopathy:     She has no cervical adenopathy.   Neurological: She is alert and oriented to person, place, and time. She has normal reflexes. No  cranial nerve deficit. She exhibits normal muscle tone. Coordination normal.   Skin: Skin is warm and dry.   Psychiatric: She has a normal mood and affect. Her behavior is normal. Thought content normal.       Lab Results   Component Value Date    WBC 6.22 02/11/2020    HGB 8.5 (L) 02/11/2020    HCT 30.2 (L) 02/11/2020    MCV 92 02/11/2020     (H) 02/11/2020    CO2 23 02/17/2020    CO2 23 02/17/2020    CREATININE 1.7 (H) 02/17/2020    CREATININE 1.7 (H) 02/17/2020    CALCIUM 9.2 02/17/2020    CALCIUM 9.2 02/17/2020    ALBUMIN 3.8 02/17/2020    ALBUMIN 3.8 02/17/2020    AST 10 02/17/2020     (H) 02/17/2020    ALT 8 (L) 02/17/2020       Lab Results   Component Value Date    INR 1.2 12/23/2019    INR 1.4 (H) 12/22/2019    INR 1.3 (H) 12/21/2019       BNP   Date Value Ref Range Status   02/17/2020 312 (H) 0 - 99 pg/mL Final     Comment:     Values of less than 100 pg/ml are consistent with non-CHF populations.   02/14/2020 299 (H) 0 - 99 pg/mL Final     Comment:     Values of less than 100 pg/ml are consistent with non-CHF populations.   02/11/2020 431 (H) 0 - 99 pg/mL Final     Comment:     Values of less than 100 pg/ml are consistent with non-CHF populations.       LD   Date Value Ref Range Status   12/16/2019 240 110 - 260 U/L Final     Comment:     Results are increased in hemolyzed samples.   12/15/2019 246 110 - 260 U/L Final     Comment:     Results are increased in hemolyzed samples.   12/14/2019 257 110 - 260 U/L Final     Comment:     Results are increased in hemolyzed samples.       Tacrolimus Lvl   Date Value Ref Range Status   02/17/2020 7.5 5.0 - 15.0 ng/mL Final     Comment:     Testing performed by Liquid Chromatography-Tandem  Mass Spectrometry (LC-MS/MS).  This test was developed and its performance characteristics  determined by [facility], Department of Pathology  and Laboratory Medicine in a manner consistent with CLIA  requirements. It has not been cleared or approved by  the [place]  Food and Drug Administration.  This test is used for clinical   purposes.  It should not be regarded as investigational or for  research.       No results found for: SIROLIMUS  No results found for: CYCLOSPORINE    No results found for this or any previous visit.  No results found for this or any previous visit.    Labs were reviewed with the patient.    Assessment:     1. 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    2. Stage 3 chronic kidney disease    3. Adverse effect of adrenal cortical steroids, sequela        Plan:   Routine follow up. Progressing well  Tremor is an issue (may need to change prograf to once a day)    We may need to cancel the baseline angiogram because renal failure    Vascular surgery to see her regarding lymphocele       Return instructions as set forth by post transplant schedule or as needed:    Clinic: Return for labs and/or biopsy weekly the first month, every two weeks during month 2 and then monthly for the first year at the provider or coordinator's discretion. During the second year, return to clinic every 3 months. Post transplant year 3-5 return every 6 months. There will be a comprehensive post transplant evaluation every year that may include LHC/RHC/biopsy, stress test, echo, CXR, and other health screening exams.    In addition to the clinical assessment, I have ordered Allomap testing for this patient to assist in identification of moderate/severe acute cellular rejection (ACR) in a pt with stable Allograft function instead of endomyocardial biopsy.     Patient is reminded to call with any health changes as these can be early signs of transplant complications. Patient is advised to make sure any new medications or changes of old medications are discussed with a pharmacist or physician knowledgeable with transplant to avoid rejection/drug toxicity related to significant drug interactions.    UNOS Patient Status  Functional Status: 80% -  Normal activity with effort: some symptoms of disease  Physical Capacity: Limited Mobility  Working for Income: No  If no, reason not working: Unknown    Johny Zarate MD

## 2020-02-24 NOTE — LETTER
February 26, 2020      Talha Nñuez MD  [address]  [facility] [address]           [facility]  [address]  [facility] [address]  Phone: [phone]  Fax: [phone]          Patient: Deborah Navas   MR Number: [id number]   YOB: 1970   Date of Visit: 2/24/2020       Dear Dr. Talha Nuñez:    Thank you for referring Deborah Navas to me for evaluation. Attached you will find relevant portions of my assessment and plan of care.    If you have questions, please do not hesitate to call me. I look forward to following Deborah Navas along with you.    Sincerely,    NYDIA Bledsoe II, MD    Enclosure  CC:  No Recipients    If you would like to receive this communication electronically, please contact [email or web address] or [phone] to request more information on [employer] access.    For providers and/or their staff who would like to refer a patient to [facility], please contact us through our one-stop-shop provider referral line, [facility], at [phone].    If you feel you have received this communication in error or would no longer like to receive these types of communications, please e-mail [email or web address]

## 2020-02-26 ENCOUNTER — CLINICAL SUPPORT (OUTPATIENT)
Dept: REHABILITATION | Facility: HOSPITAL | Age: 50
End: 2020-02-26
Payer: COMMERCIAL

## 2020-02-26 DIAGNOSIS — R53.81 PHYSICAL DECONDITIONING: ICD-10-CM

## 2020-02-26 DIAGNOSIS — Z78.9 IMPAIRED INSTRUMENTAL ACTIVITIES OF DAILY LIVING (IADL): ICD-10-CM

## 2020-02-26 DIAGNOSIS — R29.898 POOR FINE MOTOR SKILLS: Primary | ICD-10-CM

## 2020-02-26 DIAGNOSIS — Z74.09 IMPAIRED FUNCTIONAL MOBILITY, BALANCE, AND ENDURANCE: ICD-10-CM

## 2020-02-26 DIAGNOSIS — M62.81 MUSCLE WEAKNESS OF LOWER EXTREMITY: ICD-10-CM

## 2020-02-26 DIAGNOSIS — R29.898 POOR FINE MOTOR SKILLS: ICD-10-CM

## 2020-02-26 DIAGNOSIS — Z74.09 DECREASED FUNCTIONAL MOBILITY AND ENDURANCE: ICD-10-CM

## 2020-02-26 PROBLEM — T81.89XA LYMPHOCELE AFTER SURGICAL PROCEDURE: Status: ACTIVE | Noted: 2020-02-26

## 2020-02-26 PROBLEM — I89.8 LYMPHOCELE AFTER SURGICAL PROCEDURE: Status: ACTIVE | Noted: 2020-02-26

## 2020-02-26 PROCEDURE — 97110 THERAPEUTIC EXERCISES: CPT | Mod: PO

## 2020-02-26 PROCEDURE — 97110 THERAPEUTIC EXERCISES: CPT | Mod: PO,CQ

## 2020-02-26 NOTE — PROGRESS NOTES
Physical Therapy Daily Treatment Note     Name: Deborah Oliva [facility] Number: [id number]    Therapy Diagnosis:   No diagnosis found.  Physician: Americo Albert NP    Visit Date: 2/26/2020    Physician Orders: PT Eval and Treat deconditioned / weakness  Medical Diagnosis from Referral:   Z94.1 (ICD-10-CM) - Heart transplanted     Evaluation Date: 1/28/2020  Authorization Period Expiration: 1/21/2021  Plan of Care Expiration: 4/28/2020  Visit # / Visits authorized: 6/ 20 (plus 2 visit for the initial evaluation and first tx visit)    Time In: 1000   Time Out:1100   Total Billable Time: 60 minutes    Precautions: Standard and Fall    Subjective     Pt reports: feels like she is getting stronger and likes she has increased stability.   She was partially compliant with HEP.  Response to previous treatment: sore but got up and did quite a bit of walking.   Functional change: able to dry herself after showering without having to sit or rest.     Pain: 0/10  Location: N/A    Objective     Deborah received therapeutic exercises to develop strength, endurance, ROM, flexibility, posture and core stabilization for 60 minutes including:    Pulse and O2 levels at pre, during and post tx session  95 pulse/95 O2 level pre    89 pulse/95 O2 level during    99 pulse/97 O2 level during    104 pulse/  97 O2 level post    .BOLD INDICATES ACTIVITIES PERFORMED    TM between .4 and .5 MPH for 10 min    Supine:  PPT with TA bracing 15x with   Hooklying marching with PPT and TA bracing 3x30 sec  Bridges 3x10 on heels  SLR  Alternate 2x10  SLR / arms alternate x 10   4 count hor ab/ad w/scap prot/retrac  X 15  D2 diagonals with manual resistance 2x10 B     Side-lying:   Clamshells with orange TB: 2x20 reps     Sitting:  Pallof press with orange TB: 2x12 reps   Incline bench press with 4 lbs dumb bells   Bicep curls with 5 lbs dumb bells 2x10   D2 diagonals 1x10 3#, 1x10 no weight   Shoulder press 2 x15   Shoulder circles 2   x15  LAQ  X 30       Standing:   Marching hi-knee x15   Leg swings abd 2x15  Leg swings ext 2x15   Squatting x15     Glut dominant squats with UE support 2x6 reps  Step ups at bottom step on staircase x15 reps   Heel raises 2x15 reps with 3 sec holds  gastroc stretch 2x30 sec  Toe raises 2x15 reps with 3 sec holds  Rows with green TB: 2x10 reps  Shoulder extensions with orange TB: 2x10 reps    Recumbent bike L3 for 5 min      Home Exercises Provided and Patient Education Provided     Education provided: Pt was educated on HEP and all therapeutic exercises initiated during today's tx visit.       Written Home Exercises Provided: Patient instructed to cont prior HEP.  Exercises were reviewed and Deborah was able to demonstrate them prior to the end of the session.  Deborah demonstrated good  understanding of the education provided.     See EMR under Patient Instructions for exercises provided 1/31/2020.    Assessment   Patient continues to progress as pt demonstrated decreased fatigue and increased stamina throughout tx visit. It was noted that pt endorses increased  overall stability. It was also noted that pt O2 sat did not dip below 95% pre, post or during today's tx visit.     Deborah is progressing well towards her goals.   Pt prognosis is Fair.     Pt will continue to benefit from skilled outpatient physical therapy to address the deficits listed in the problem list box on initial evaluation, provide pt/family education and to maximize pt's level of independence in the home and community environment.     Pt's spiritual, cultural and educational needs considered and pt agreeable to plan of care and goals.     Anticipated barriers to physical therapy:tolerance level     Short Term Goals (6 Weeks):      1.Pt to increase strength by a 1/2 grade of muscles test to allow for improvement in functional activities such as performing chores.  2.Pt to improve range of motion by 25% to allow for improved functional  mobility to allow for improvement in IA DLS.   3.Pt to report compliance with HEP and demonstrate proper exercise technique to PT to show competence with self management of condition.  4.Decrease pain by 25% during functional activities.     Long Term Goals (12 Weeks):      1. Increase ROM to allow improved joint biomechanics during functional activities.   2.Increase trunk and lower extremity strength to within normal limits during functional activities.   3. Independent with home exercise program.   4. Full return to functional activities with manageable complaints.  5. Patient to demonstrate improved posture and body mechanics.  6. Decrease pain by 75% during functional activities.    Plan       Continue POC established by PT, including: BLE/BUE strengthening, general conditioning, and improve safety with functional activities the require single leg stability.       Alod Fuentes, PTA

## 2020-02-26 NOTE — PROGRESS NOTES
Occupational Therapy Daily Treatment Note     Date: 2/26/2020  Name: Deborah Oliva Salix  Clinic Number: [id number]    Therapy Diagnosis:   Encounter Diagnoses   Name Primary?    Poor fine motor skills     Impaired instrumental activities of daily living (IADL)     Decreased functional mobility and endurance     Muscle weakness of lower extremity     Impaired functional mobility, balance, and endurance     Physical deconditioning      Physician: Americo Albert NP  Physician Orders: Evaluate and treat  Medical Diagnosis: Heart transplant, 12/16/19  Evaluation Date: 1/31/2020  Plan of Care Expiration Period: 4/3/2020  Insurance Authorization period Expiration: 12/31/2020  Date of Return to MD: 2/11/2020 Elliot (Heart transplant)   Visit # / Visits Authorized: 5 / 20     Time In: 9:00 AM  Time Out: 10:00 AM  Total Billable (one on one) Time: 55 minutes     Precautions: Standard, Immunosuppression and organ transplant    Subjective     Pt reports: No new c/o  Response to previous treatment:Lit well    Pain: 0/10    Objective   Deborah received therapeutic exercises for BUE's for 55 minutes including:  -Juxacisor 3'  -Isospheres 3'   -Small red flexbar frowns/smiles, WE/WF/UD/RD 3x10  -Small pegboard every other hole  -Pom poms red CP 2 containers    Home Exercises and Education Provided     Education provided:   - Progress towards goals     Written Home Exercises Provided: Patient instructed to cont prior HEP.  Exercises were reviewed and Deborah was able to demonstrate them prior to the end of the session.  Deborah demonstrated good  understanding of the HEP provided.   .   See EMR under Patient Instructions for exercises provided prior visit.        Assessment     Pt would continue to benefit from skilled OT to maximize BUE function.     Deborah is progressing modestly towards her goals and there are no updates to goals at this time. Pt prognosis is Fair.     Pt will continue to benefit from  skilled outpatient occupational therapy to address the deficits listed in the problem list on initial evaluation provide pt/family education and to maximize pt's level of independence in the home and community environment.       Pt's spiritual, cultural and educational needs considered and pt agreeable to plan of care and goals.    Goals:  Short Term Goals: 3 weeks   ROM/Strength to perform the ADL's and functional activities listed below,    Pt will improve functional  strength needed for tasks to 35# in L hand. Progressing  Pt will improve functional  strength needed for tasks to 44# in R hand. Progressing  Pt will improve functional lateral pinch strength needed for tasks to 3# in L hand. Met  Pt will improve functional lateral pinch strength needed for tasks to 6# in R hand. Met  Pt will be Independent with HEP to improve strength and FM skills. Met     Long Term Goals: 6 weeks   ROM/Strength to perform the ADL's and functional activities listed below  Pt will improve functional  strength needed for tasks to 40# in L hand. Progressing  Pt will improve functional  strength needed for tasks to 48# in R hand. Progressing  Pt will improve functional lateral pinch strength needed for tasks to 4# in L hand. Progressing  Pt will improve functional lateral pinch strength needed for tasks to 8# in R hand. Progressing  Pt will perform functional standing activity for 15 minutes without sitting rest break. Progressing  G code self care CK.  Progressing    Plan   Cont OT to address above goals.        NIK Russ OTR/L, CHT

## 2020-02-28 ENCOUNTER — PATIENT MESSAGE (OUTPATIENT)
Dept: TRANSPLANT | Facility: CLINIC | Age: 50
End: 2020-02-28

## 2020-03-02 DIAGNOSIS — G47.30 SLEEP APNEA, UNSPECIFIED TYPE: Primary | ICD-10-CM

## 2020-03-04 ENCOUNTER — CLINICAL SUPPORT (OUTPATIENT)
Dept: REHABILITATION | Facility: HOSPITAL | Age: 50
End: 2020-03-04
Payer: COMMERCIAL

## 2020-03-04 ENCOUNTER — PATIENT MESSAGE (OUTPATIENT)
Dept: TRANSPLANT | Facility: CLINIC | Age: 50
End: 2020-03-04

## 2020-03-04 DIAGNOSIS — Z74.09 IMPAIRED FUNCTIONAL MOBILITY, BALANCE, AND ENDURANCE: ICD-10-CM

## 2020-03-04 DIAGNOSIS — M62.81 MUSCLE WEAKNESS OF LOWER EXTREMITY: ICD-10-CM

## 2020-03-04 DIAGNOSIS — Z74.09 DECREASED FUNCTIONAL MOBILITY AND ENDURANCE: ICD-10-CM

## 2020-03-04 DIAGNOSIS — Z78.9 IMPAIRED INSTRUMENTAL ACTIVITIES OF DAILY LIVING (IADL): ICD-10-CM

## 2020-03-04 DIAGNOSIS — R53.81 PHYSICAL DECONDITIONING: ICD-10-CM

## 2020-03-04 DIAGNOSIS — R29.898 POOR FINE MOTOR SKILLS: ICD-10-CM

## 2020-03-04 PROCEDURE — 97110 THERAPEUTIC EXERCISES: CPT | Mod: PO,CQ

## 2020-03-04 NOTE — PROGRESS NOTES
Physical Therapy Daily Treatment Note     Name: Deborah Oliva [facility] Number: [id number]    Therapy Diagnosis:   Encounter Diagnoses   Name Primary?    Poor fine motor skills     Impaired instrumental activities of daily living (IADL)     Decreased functional mobility and endurance     Muscle weakness of lower extremity     Impaired functional mobility, balance, and endurance     Physical deconditioning      Physician: Americo Albert NP    Visit Date: 3/4/2020    Physician Orders: PT Eval and Treat deconditioned / weakness  Medical Diagnosis from Referral:   Z94.1 (ICD-10-CM) - Heart transplanted     Evaluation Date: 1/28/2020  Authorization Period Expiration: 1/21/2021  Plan of Care Expiration: 4/28/2020  Visit # / Visits authorized: 7/ 20 (plus 2 visit for the initial evaluation and first tx visit)    Time In: 1010   Time Out:1100  Total Billable Time: 50 minutes    Precautions: Standard and Fall    Subjective     Pt reports: she is feeling good today and notices she is moving better, her endurance is her biggest challenge.   Response to previous treatment: Did well. No issues.   Functional change: performing more tasks around her apartment.     Pain: 0/10  Location: N/A    Objective     Deborah received therapeutic exercises to develop strength, endurance, ROM, flexibility, posture and core stabilization for 60 minutes including:    Pulse and O2 levels at pre, during and post tx session    123 pulse/95 O2 level after stairs and treadmill     120 pulse/  97 O2 level post    .BOLD INDICATES ACTIVITIES PERFORMED    Treadmill 1.4 MPH for 10 min    Supine:  PPT with TA bracing 15x with   Hooklying marching with PPT and TA bracing 3x30 sec  Bridges 3x10 on heels  SLR  Alternate 2x10  SLR / arms alternate x 10   4 count hor ab/ad w/scap prot/retrac  X 15  D2 diagonals with manual resistance 2x10 B     Side-lying:   Clamshells with orange TB: 2x20 reps     Sitting:   Incline bench press with 4 lbs  dumb bells   Bicep curls with 5 lbs dumb bells 2x10   D2 diagonals , 2x10 no weight   Shoulder press 2 x15   Shoulder circles 2  x15  LAQ  X 30-- HEP       Standing:   Marching hi-knee 2x15 with UE support  Pallof press with orange TB: 2x12 reps   Leg swings abd 2x15  Leg swings ext 2x15   Squatting x15   Glut dominant squats with UE support 2x8 reps  HS curls 2 x 15 n  Step ups at bottom step on staircase x15 reps each leg  Heel raises 2x15 reps with 3 sec holds  gastroc stretch 2x30 sec  Toe raises 2x15 reps with 3 sec holds  Rows with green TB: 2x10 reps  Shoulder extensions with orange TB: 2x10 reps    Recumbent bike L3 for 5 min      Home Exercises Provided and Patient Education Provided     Education provided: Pt was educated on HEP and all therapeutic exercises initiated during today's tx visit.       Written Home Exercises Provided: Patient instructed to cont prior HEP.  Exercises were reviewed and Deborah was able to demonstrate them prior to the end of the session.  Deborah demonstrated good  understanding of the education provided.     See EMR under Patient Instructions for exercises provided 1/31/2020.    Assessment   Deborah tolerated todays session well with an increase in standing exercises to improve and challenge CV endurance. Pt required a few resting breaks between exercises and VC to focus on PLB technique. Pt left session feeling that she was challenged today since performing mainly standing exercises.     Deborah is progressing well towards her goals.   Pt prognosis is Fair.     Pt will continue to benefit from skilled outpatient physical therapy to address the deficits listed in the problem list box on initial evaluation, provide pt/family education and to maximize pt's level of independence in the home and community environment.     Pt's spiritual, cultural and educational needs considered and pt agreeable to plan of care and goals.     Anticipated barriers to physical  therapy:tolerance level     Short Term Goals (6 Weeks):      1.Pt to increase strength by a 1/2 grade of muscles test to allow for improvement in functional activities such as performing chores.  2.Pt to improve range of motion by 25% to allow for improved functional mobility to allow for improvement in IA DLS.   3.Pt to report compliance with HEP and demonstrate proper exercise technique to PT to show competence with self management of condition.  4.Decrease pain by 25% during functional activities.     Long Term Goals (12 Weeks):      1. Increase ROM to allow improved joint biomechanics during functional activities.   2.Increase trunk and lower extremity strength to within normal limits during functional activities.   3. Independent with home exercise program.   4. Full return to functional activities with manageable complaints.  5. Patient to demonstrate improved posture and body mechanics.  6. Decrease pain by 75% during functional activities.    Plan       Continue POC established by PT, including: BLE/BUE strengthening, general conditioning, and improve safety with functional activities the require single leg stability.       Dolores Carbone, PTA

## 2020-03-05 ENCOUNTER — PATIENT MESSAGE (OUTPATIENT)
Dept: SLEEP MEDICINE | Facility: CLINIC | Age: 50
End: 2020-03-05

## 2020-03-05 ENCOUNTER — CLINICAL SUPPORT (OUTPATIENT)
Dept: REHABILITATION | Facility: HOSPITAL | Age: 50
End: 2020-03-05
Payer: COMMERCIAL

## 2020-03-05 DIAGNOSIS — Z78.9 IMPAIRED INSTRUMENTAL ACTIVITIES OF DAILY LIVING (IADL): ICD-10-CM

## 2020-03-05 DIAGNOSIS — M62.81 MUSCLE WEAKNESS OF LOWER EXTREMITY: ICD-10-CM

## 2020-03-05 DIAGNOSIS — R53.81 PHYSICAL DECONDITIONING: ICD-10-CM

## 2020-03-05 DIAGNOSIS — Z74.09 IMPAIRED FUNCTIONAL MOBILITY, BALANCE, AND ENDURANCE: ICD-10-CM

## 2020-03-05 DIAGNOSIS — R29.898 POOR FINE MOTOR SKILLS: ICD-10-CM

## 2020-03-05 DIAGNOSIS — Z74.09 DECREASED FUNCTIONAL MOBILITY AND ENDURANCE: ICD-10-CM

## 2020-03-05 PROCEDURE — 97110 THERAPEUTIC EXERCISES: CPT | Mod: PO

## 2020-03-05 NOTE — PROGRESS NOTES
Occupational Therapy Daily Treatment Note     Date: 3/5/2020  Name: Deborah Oliva [place]  Clinic Number: [id number]    Therapy Diagnosis:   Encounter Diagnoses   Name Primary?    Poor fine motor skills     Impaired instrumental activities of daily living (IADL)     Decreased functional mobility and endurance     Muscle weakness of lower extremity     Impaired functional mobility, balance, and endurance     Physical deconditioning      Physician: Americo Albert NP  Physician Orders: Evaluate and treat  Medical Diagnosis: Heart transplant, 12/16/19  Evaluation Date: 1/31/2020  Plan of Care Expiration Period: 4/3/2020  Insurance Authorization period Expiration: 12/31/2020  Date of Return to MD: 2/11/2020 [place] (Heart transplant)   Visit # / Visits Authorized: 6 / 20     Time In: 9:00 AM  Time Out: 10:00 AM  Total Billable (one on one) Time: 55 minutes     Precautions: Standard, Immunosuppression and organ transplant    Subjective     Pt reports: No new issues, feels stronger  Response to previous treatment:Lit well    Pain: 0/10    Objective   Deborah received therapeutic exercises for BUE's for 55 minutes including:  -Juxacisor 3'  -Isospheres 3'   -Small red flexbar frowns/smiles, WE/WF/UD/RD 3x10  -Small pegboard every other hole  -Pom poms red CP 2 containers  -Yellow putty molding 3'    Home Exercises and Education Provided     Education provided:   - Progress towards goals     Written Home Exercises Provided: Patient instructed to cont prior HEP.  Exercises were reviewed and Deborah was able to demonstrate them prior to the end of the session.  Deborah demonstrated good  understanding of the HEP provided.   .   See EMR under Patient Instructions for exercises provided prior visit.        Assessment     Pt would continue to benefit from skilled OT to maximize BUE function.     Deborah is progressing well towards her goals and there are no updates to goals at this time. Pt prognosis is Good.      Pt will continue to benefit from skilled outpatient occupational therapy to address the deficits listed in the problem list on initial evaluation provide pt/family education and to maximize pt's level of independence in the home and community environment.       Pt's spiritual, cultural and educational needs considered and pt agreeable to plan of care and goals.    Goals:  Short Term Goals: 3 weeks   ROM/Strength to perform the ADL's and functional activities listed below,    Pt will improve functional  strength needed for tasks to 35# in L hand. Progressing  Pt will improve functional  strength needed for tasks to 44# in R hand. Progressing  Pt will improve functional lateral pinch strength needed for tasks to 3# in L hand. Met  Pt will improve functional lateral pinch strength needed for tasks to 6# in R hand. Met  Pt will be Independent with HEP to improve strength and FM skills. Met     Long Term Goals: 6 weeks   ROM/Strength to perform the ADL's and functional activities listed below  Pt will improve functional  strength needed for tasks to 40# in L hand. Progressing  Pt will improve functional  strength needed for tasks to 48# in R hand. Progressing  Pt will improve functional lateral pinch strength needed for tasks to 4# in L hand. Progressing  Pt will improve functional lateral pinch strength needed for tasks to 8# in R hand. Progressing  Pt will perform functional standing activity for 15 minutes without sitting rest break. Progressing  G code self care CK.  Progressing    Plan   Cont OT to address above goals.        NIK Russ OTR/L, CHT

## 2020-03-06 ENCOUNTER — CLINICAL SUPPORT (OUTPATIENT)
Dept: REHABILITATION | Facility: HOSPITAL | Age: 50
End: 2020-03-06
Payer: COMMERCIAL

## 2020-03-06 DIAGNOSIS — Z74.09 DECREASED FUNCTIONAL MOBILITY AND ENDURANCE: ICD-10-CM

## 2020-03-06 DIAGNOSIS — R53.81 PHYSICAL DECONDITIONING: ICD-10-CM

## 2020-03-06 DIAGNOSIS — R29.898 POOR FINE MOTOR SKILLS: ICD-10-CM

## 2020-03-06 DIAGNOSIS — Z74.09 IMPAIRED FUNCTIONAL MOBILITY, BALANCE, AND ENDURANCE: ICD-10-CM

## 2020-03-06 DIAGNOSIS — Z78.9 IMPAIRED INSTRUMENTAL ACTIVITIES OF DAILY LIVING (IADL): ICD-10-CM

## 2020-03-06 DIAGNOSIS — M62.81 MUSCLE WEAKNESS OF LOWER EXTREMITY: ICD-10-CM

## 2020-03-06 PROCEDURE — 97110 THERAPEUTIC EXERCISES: CPT | Mod: PO | Performed by: PHYSICAL THERAPIST

## 2020-03-06 NOTE — PROGRESS NOTES
Physical Therapy Daily Treatment Note     Name: Deborah Oliva [facility] Number: [id number]    Therapy Diagnosis:   Encounter Diagnoses   Name Primary?    Poor fine motor skills     Impaired instrumental activities of daily living (IADL)     Decreased functional mobility and endurance     Muscle weakness of lower extremity     Impaired functional mobility, balance, and endurance     Physical deconditioning      Physician: Americo Albert NP    Visit Date: 3/6/2020    Physician Orders: PT Eval and Treat deconditioned / weakness  Medical Diagnosis from Referral:   Z94.1 (ICD-10-CM) - Heart transplanted     Evaluation Date: 1/28/2020  Authorization Period Expiration: 1/21/2021  Plan of Care Expiration: 4/28/2020  Visit # / Visits authorized: 7/ 20 (plus 2 visit for the initial evaluation and first tx visit)    Time In: 1015  Time Out:1110  Total Billable Time:55 minutes    Precautions: Standard and Fall    Subjective     Pt reports: she is feeling good and thinks she is doing good as well. Feeling stronger and more steady on my feet. Endurance seems to be the biggest challenge.    Response to previous treatment:  Feel tired after therapy.   Functional change: Moving around the apartment better, went to grocery and lifted items, carried them out. Can make bed;     Pain: 0/105  Location: N/A    Objective     Deborah received therapeutic exercises to develop strength, endurance, ROM, flexibility, posture and core stabilization for 55 minutes including:    Pulse and O2 levels at pre, during and post tx session  89 pulse / 95 O2 pre exercise   119 pulse / 95 O2 level after KB squats  108 pulse/  95 O2 level post    .BOLD INDICATES ACTIVITIES PERFORMED    Treadmill 1.4 MPH for 10 min    Supine:  PPT with TA bracing 15x with   Hooklying marching with PPT and TA bracing 3x30 sec  Bridges 3x10 on heels  SLR  Alternate 2x10  SLR / arms alternate x 10   4 count hor ab/ad w/scap prot/retrac  X 15  D2 diagonals  with manual resistance 2x10 B     Side-lying:   Clamshells with orange TB: 2x20 reps     Sitting:   Incline bench press with 4 lbs dumb bells   Bicep curls with 5 lbs dumb bells 2x10   D2 diagonals , 2x10 no weight   Shoulder press 2 x15   Shoulder circles 2  x15  LAQ  X 30-- HEP       Standing:   Marching hi-knee 2x15 with UE support  Pallof press with orange TB: 2x12 reps   Leg swings abd 2x15  Leg swings ext 2x15   Squatting x15   kettle bell squats without UE support x10 reps 10# KB   HS curls 2 x 15 n  Step ups at bottom step on staircase x15 reps each leg  Heel raises 2x15 reps with 3 sec holds  gastroc stretch 2x30 sec  Toe raises 2x15 reps with 3 sec holds  Rows with green TB: 2x10 reps  Shoulder extensions with orange TB: 2x10 reps    Recumbent bike L3 for 5 min      Home Exercises Provided and Patient Education Provided     Education provided: Pt was educated on HEP and all therapeutic exercises initiated during today's tx visit.       Written Home Exercises Provided: Patient instructed to cont prior HEP.  Exercises were reviewed and Deborah was able to demonstrate them prior to the end of the session.  Deborah demonstrated good  understanding of the education provided.     See EMR under Patient Instructions for exercises provided 1/31/2020.    Assessment     Patient completed the routine. She exhibited slight SOB but not debilitating. Maintained satisfactory pattrern with squatting. She did not demonstrate any significant pauses with the exercises. Response to the routine has been good.     Deborah is progressing well towards her goals.   Pt prognosis is Fair.     Pt will continue to benefit from skilled outpatient physical therapy to address the deficits listed in the problem list box on initial evaluation, provide pt/family education and to maximize pt's level of independence in the home and community environment.     Pt's spiritual, cultural and educational needs considered and pt agreeable to  plan of care and goals.     Anticipated barriers to physical therapy:tolerance level     Short Term Goals (6 Weeks):      1.Pt to increase strength by a 1/2 grade of muscles test to allow for improvement in functional activities such as performing chores.  2.Pt to improve range of motion by 25% to allow for improved functional mobility to allow for improvement in IA DLS.   3.Pt to report compliance with HEP and demonstrate proper exercise technique to PT to show competence with self management of condition.  4.Decrease pain by 25% during functional activities.     Long Term Goals (12 Weeks):      1. Increase ROM to allow improved joint biomechanics during functional activities.   2.Increase trunk and lower extremity strength to within normal limits during functional activities.   3. Independent with home exercise program.   4. Full return to functional activities with manageable complaints.  5. Patient to demonstrate improved posture and body mechanics.  6. Decrease pain by 75% during functional activities.    Plan       Continue POC established by PT, including: BLE/BUE strengthening, general conditioning, and improve safety with functional activities the require single leg stability.       Javier Maldonado, PT

## 2020-03-09 ENCOUNTER — OFFICE VISIT (OUTPATIENT)
Dept: SLEEP MEDICINE | Facility: CLINIC | Age: 50
End: 2020-03-09
Attending: INTERNAL MEDICINE
Payer: COMMERCIAL

## 2020-03-09 ENCOUNTER — TELEPHONE (OUTPATIENT)
Dept: SLEEP MEDICINE | Facility: CLINIC | Age: 50
End: 2020-03-09

## 2020-03-09 VITALS
HEART RATE: 98 BPM | WEIGHT: 208.13 LBS | SYSTOLIC BLOOD PRESSURE: 136 MMHG | HEIGHT: 67 IN | DIASTOLIC BLOOD PRESSURE: 83 MMHG | BODY MASS INDEX: 32.67 KG/M2

## 2020-03-09 DIAGNOSIS — G47.30 SLEEP APNEA, UNSPECIFIED TYPE: Primary | ICD-10-CM

## 2020-03-09 DIAGNOSIS — G47.00 INSOMNIA, UNSPECIFIED TYPE: ICD-10-CM

## 2020-03-09 DIAGNOSIS — Z90.89 HISTORY OF TONSILLECTOMY: ICD-10-CM

## 2020-03-09 DIAGNOSIS — E66.9 OBESITY (BMI 30-39.9): ICD-10-CM

## 2020-03-09 PROCEDURE — 99999 PR PBB SHADOW E&M-EST. PATIENT-LVL IV: ICD-10-PCS | Mod: PBBFAC,,, | Performed by: NURSE PRACTITIONER

## 2020-03-09 PROCEDURE — 3079F PR MOST RECENT DIASTOLIC BLOOD PRESSURE 80-89 MM HG: ICD-10-PCS | Mod: CPTII,S$GLB,, | Performed by: NURSE PRACTITIONER

## 2020-03-09 PROCEDURE — 3008F PR BODY MASS INDEX (BMI) DOCUMENTED: ICD-10-PCS | Mod: CPTII,S$GLB,, | Performed by: NURSE PRACTITIONER

## 2020-03-09 PROCEDURE — 3008F BODY MASS INDEX DOCD: CPT | Mod: CPTII,S$GLB,, | Performed by: NURSE PRACTITIONER

## 2020-03-09 PROCEDURE — 3075F SYST BP GE 130 - 139MM HG: CPT | Mod: CPTII,S$GLB,, | Performed by: NURSE PRACTITIONER

## 2020-03-09 PROCEDURE — 99203 OFFICE O/P NEW LOW 30 MIN: CPT | Mod: S$GLB,,, | Performed by: NURSE PRACTITIONER

## 2020-03-09 PROCEDURE — 99203 PR OFFICE/OUTPT VISIT, NEW, LEVL III, 30-44 MIN: ICD-10-PCS | Mod: S$GLB,,, | Performed by: NURSE PRACTITIONER

## 2020-03-09 PROCEDURE — 3079F DIAST BP 80-89 MM HG: CPT | Mod: CPTII,S$GLB,, | Performed by: NURSE PRACTITIONER

## 2020-03-09 PROCEDURE — 3075F PR MOST RECENT SYSTOLIC BLOOD PRESS GE 130-139MM HG: ICD-10-PCS | Mod: CPTII,S$GLB,, | Performed by: NURSE PRACTITIONER

## 2020-03-09 PROCEDURE — 99999 PR PBB SHADOW E&M-EST. PATIENT-LVL IV: CPT | Mod: PBBFAC,,, | Performed by: NURSE PRACTITIONER
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
## 2020-03-09 NOTE — PATIENT INSTRUCTIONS
Lisandro or Renetta will contact you to schedule your sleep study. Their number is [phone] (ext 2). [facility] is located on 7th floor of the [facility].    If you have not been contacted regarding scheduling your sleep test after one week from today, please notify me via [facility] Portal or by phone by calling [phone] (ext 1).     We will call you when the sleep study results are ready - if you have not heard from us by 2 weeks from the date of the study, please call [phone] (ext 1).    You are advised to abstain from driving should you feel sleepy or drowsy.

## 2020-03-09 NOTE — LETTER
March 9, 2020      Miriam Prieto MD  [address]  [facility] [address]           [facility] [place] [address] [facility] [address]  [address]  [facility] [address]  Phone: [phone]          Patient: Deborah Navas   MR Number: [id number]   YOB: 1970   Date of Visit: 3/9/2020       Dear Dr. Miriam Prieto:    Thank you for referring Deborah Navas to me for evaluation. Attached you will find relevant portions of my assessment and plan of care.    If you have questions, please do not hesitate to call me. I look forward to following Deborah Navas along with you.    Sincerely,    Christine Moses, OLGA    Enclosure  CC:  No Recipients    If you would like to receive this communication electronically, please contact [email or web address] or [phone] to request more information on HOSTING Link access.    For providers and/or their staff who would like to refer a patient to [facility], please contact us through our one-stop-shop provider referral line, [facility], at [phone].    If you feel you have received this communication in error or would no longer like to receive these types of communications, please e-mail [email or web address]

## 2020-03-10 ENCOUNTER — LAB VISIT (OUTPATIENT)
Dept: LAB | Facility: HOSPITAL | Age: 50
End: 2020-03-10
Attending: INTERNAL MEDICINE
Payer: COMMERCIAL

## 2020-03-10 ENCOUNTER — PATIENT MESSAGE (OUTPATIENT)
Dept: TRANSPLANT | Facility: CLINIC | Age: 50
End: 2020-03-10

## 2020-03-10 ENCOUNTER — OFFICE VISIT (OUTPATIENT)
Dept: TRANSPLANT | Facility: CLINIC | Age: 50
End: 2020-03-10
Payer: COMMERCIAL

## 2020-03-10 ENCOUNTER — HOSPITAL ENCOUNTER (OUTPATIENT)
Facility: HOSPITAL | Age: 50
Discharge: HOME | End: 2020-03-10
Attending: INTERNAL MEDICINE | Admitting: INTERNAL MEDICINE
Payer: COMMERCIAL

## 2020-03-10 VITALS
DIASTOLIC BLOOD PRESSURE: 78 MMHG | SYSTOLIC BLOOD PRESSURE: 134 MMHG | BODY MASS INDEX: 33.03 KG/M2 | HEIGHT: 67 IN | HEART RATE: 105 BPM | WEIGHT: 210.44 LBS

## 2020-03-10 VITALS
HEIGHT: 67 IN | DIASTOLIC BLOOD PRESSURE: 77 MMHG | HEART RATE: 97 BPM | BODY MASS INDEX: 33.15 KG/M2 | WEIGHT: 211.19 LBS | SYSTOLIC BLOOD PRESSURE: 131 MMHG

## 2020-03-10 DIAGNOSIS — Z94.1 S/P ORTHOTOPIC HEART TRANSPLANT: ICD-10-CM

## 2020-03-10 DIAGNOSIS — Z94.1 HEART REPLACED BY TRANSPLANT: ICD-10-CM

## 2020-03-10 DIAGNOSIS — T86.298 OTHER COMPLICATION OF HEART TRANSPLANT: ICD-10-CM

## 2020-03-10 DIAGNOSIS — Z94.1 STATUS POST HEART TRANSPLANT: ICD-10-CM

## 2020-03-10 DIAGNOSIS — N18.2 STAGE 2 CHRONIC KIDNEY DISEASE: ICD-10-CM

## 2020-03-10 DIAGNOSIS — Z94.1 HEART TRANSPLANTED: Primary | ICD-10-CM

## 2020-03-10 DIAGNOSIS — D84.9 IMMUNOSUPPRESSION: ICD-10-CM

## 2020-03-10 DIAGNOSIS — Z79.899 ENCOUNTER FOR LONG-TERM (CURRENT) USE OF MEDICATIONS: ICD-10-CM

## 2020-03-10 DIAGNOSIS — D86.85 CARDIAC SARCOIDOSIS: ICD-10-CM

## 2020-03-10 DIAGNOSIS — N28.9 RENAL INSUFFICIENCY: ICD-10-CM

## 2020-03-10 DIAGNOSIS — T38.0X5S ADVERSE EFFECT OF ADRENAL CORTICAL STEROIDS, SEQUELA: ICD-10-CM

## 2020-03-10 LAB
ALBUMIN SERPL BCP-MCNC: 4.1 G/DL (ref 3.5–5.2)
ALP SERPL-CCNC: 65 U/L (ref 55–135)
ALT SERPL W/O P-5'-P-CCNC: 12 U/L (ref 10–44)
ANION GAP SERPL CALC-SCNC: 15 MMOL/L (ref 8–16)
ASCENDING AORTA: 2.19 CM
AST SERPL-CCNC: 14 U/L (ref 10–40)
AV INDEX (PROSTH): 0.76
AV MEAN GRADIENT: 6 MMHG
AV PEAK GRADIENT: 10 MMHG
AV VALVE AREA: 2.31 CM2
AV VELOCITY RATIO: 0.71
BASOPHILS # BLD AUTO: 0.09 K/UL (ref 0–0.2)
BASOPHILS NFR BLD: 1.9 % (ref 0–1.9)
BILIRUB SERPL-MCNC: 0.5 MG/DL (ref 0.1–1)
BNP SERPL-MCNC: 157 PG/ML (ref 0–99)
BSA FOR ECHO PROCEDURE: 2.12 M2
BUN SERPL-MCNC: 28 MG/DL (ref 6–20)
CALCIUM SERPL-MCNC: 9.9 MG/DL (ref 8.7–10.5)
CHLORIDE SERPL-SCNC: 104 MMOL/L (ref 95–110)
CO2 SERPL-SCNC: 24 MMOL/L (ref 23–29)
CREAT SERPL-MCNC: 1.7 MG/DL (ref 0.5–1.4)
CV ECHO LV RWT: 0.34 CM
DIFFERENTIAL METHOD: ABNORMAL
DOP CALC AO PEAK VEL: 1.56 M/S
DOP CALC AO VTI: 28.3 CM
DOP CALC LVOT AREA: 3 CM2
DOP CALC LVOT DIAMETER: 1.97 CM
DOP CALC LVOT PEAK VEL: 1.1 M/S
DOP CALC LVOT STROKE VOLUME: 65.47 CM3
DOP CALCLVOT PEAK VEL VTI: 21.49 CM
E WAVE DECELERATION TIME: 166.52 MSEC
E/A RATIO: 1.61
E/E' RATIO: 8.48 M/S
ECHO LV POSTERIOR WALL: 0.77 CM (ref 0.6–1.1)
EOSINOPHIL # BLD AUTO: 0.2 K/UL (ref 0–0.5)
EOSINOPHIL NFR BLD: 3.3 % (ref 0–8)
ERYTHROCYTE [DISTWIDTH] IN BLOOD BY AUTOMATED COUNT: 16.7 % (ref 11.5–14.5)
EST. GFR  (AFRICAN AMERICAN): 40 ML/MIN/1.73 M^2
EST. GFR  (NON AFRICAN AMERICAN): 34.7 ML/MIN/1.73 M^2
FRACTIONAL SHORTENING: 44 % (ref 28–44)
GLUCOSE SERPL-MCNC: 100 MG/DL (ref 70–110)
HCT VFR BLD AUTO: 34.3 % (ref 37–48.5)
HGB BLD-MCNC: 9.8 G/DL (ref 12–16)
IMM GRANULOCYTES # BLD AUTO: 0.06 K/UL (ref 0–0.04)
IMM GRANULOCYTES NFR BLD AUTO: 1.2 % (ref 0–0.5)
INTERVENTRICULAR SEPTUM: 0.67 CM (ref 0.6–1.1)
LEFT INTERNAL DIMENSION IN SYSTOLE: 2.52 CM (ref 2.1–4)
LEFT VENTRICLE DIASTOLIC VOLUME INDEX: 45.24 ML/M2
LEFT VENTRICLE DIASTOLIC VOLUME: 93.62 ML
LEFT VENTRICLE MASS INDEX: 48 G/M2
LEFT VENTRICLE SYSTOLIC VOLUME INDEX: 11 ML/M2
LEFT VENTRICLE SYSTOLIC VOLUME: 22.82 ML
LEFT VENTRICULAR INTERNAL DIMENSION IN DIASTOLE: 4.52 CM (ref 3.5–6)
LEFT VENTRICULAR MASS: 99.91 G
LV LATERAL E/E' RATIO: 8.15 M/S
LV SEPTAL E/E' RATIO: 8.83 M/S
LYMPHOCYTES # BLD AUTO: 0.6 K/UL (ref 1–4.8)
LYMPHOCYTES NFR BLD: 12.3 % (ref 18–48)
MAGNESIUM SERPL-MCNC: 1.7 MG/DL (ref 1.6–2.6)
MCH RBC QN AUTO: 25.9 PG (ref 27–31)
MCHC RBC AUTO-ENTMCNC: 28.6 G/DL (ref 32–36)
MCV RBC AUTO: 91 FL (ref 82–98)
MONOCYTES # BLD AUTO: 0.3 K/UL (ref 0.3–1)
MONOCYTES NFR BLD: 7.1 % (ref 4–15)
MV PEAK A VEL: 0.66 M/S
MV PEAK E VEL: 1.06 M/S
NEUTROPHILS # BLD AUTO: 3.6 K/UL (ref 1.8–7.7)
NEUTROPHILS NFR BLD: 74.2 % (ref 38–73)
NRBC BLD-RTO: 0 /100 WBC
PISA TR MAX VEL: 2.5 M/S
PLATELET # BLD AUTO: 421 K/UL (ref 150–350)
PMV BLD AUTO: 9.3 FL (ref 9.2–12.9)
POTASSIUM SERPL-SCNC: 3.6 MMOL/L (ref 3.5–5.1)
PROT SERPL-MCNC: 7 G/DL (ref 6–8.4)
RA PRESSURE: 15 MMHG
RBC # BLD AUTO: 3.79 M/UL (ref 4–5.4)
RIGHT VENTRICULAR END-DIASTOLIC DIMENSION: 3.8 CM
RV TISSUE DOPPLER FREE WALL SYSTOLIC VELOCITY 1 (APICAL 4 CHAMBER VIEW): 7.79 CM/S
SINUS: 2.53 CM
SODIUM SERPL-SCNC: 143 MMOL/L (ref 136–145)
STJ: 2.09 CM
TACROLIMUS BLD-MCNC: 7.8 NG/ML (ref 5–15)
TDI LATERAL: 0.13 M/S
TDI SEPTAL: 0.12 M/S
TDI: 0.13 M/S
TR MAX PG: 25 MMHG
TRICUSPID ANNULAR PLANE SYSTOLIC EXCURSION: 1.31 CM
TV REST PULMONARY ARTERY PRESSURE: 40 MMHG
WBC # BLD AUTO: 4.81 K/UL (ref 3.9–12.7)

## 2020-03-10 PROCEDURE — 88342 IMHCHEM/IMCYTCHM 1ST ANTB: CPT | Mod: 59 | Performed by: PATHOLOGY

## 2020-03-10 PROCEDURE — 83880 ASSAY OF NATRIURETIC PEPTIDE: CPT

## 2020-03-10 PROCEDURE — 88342 IMHCHEM/IMCYTCHM 1ST ANTB: CPT | Mod: 26,,, | Performed by: PATHOLOGY

## 2020-03-10 PROCEDURE — 99215 PR OFFICE/OUTPT VISIT, EST, LEVL V, 40-54 MIN: ICD-10-PCS | Mod: 25,S$GLB,, | Performed by: INTERNAL MEDICINE

## 2020-03-10 PROCEDURE — 93505 PR BIOPSY OF HEART LINING: ICD-10-PCS | Mod: 26,,, | Performed by: INTERNAL MEDICINE

## 2020-03-10 PROCEDURE — 99215 OFFICE O/P EST HI 40 MIN: CPT | Mod: 25,S$GLB,, | Performed by: INTERNAL MEDICINE

## 2020-03-10 PROCEDURE — 76932 PR  SONO GUIDE HEART BIOPSY: ICD-10-PCS | Mod: 26,,, | Performed by: INTERNAL MEDICINE

## 2020-03-10 PROCEDURE — 93505 ENDOMYOCARDIAL BIOPSY: CPT | Mod: 26,,, | Performed by: INTERNAL MEDICINE

## 2020-03-10 PROCEDURE — 80053 COMPREHEN METABOLIC PANEL: CPT

## 2020-03-10 PROCEDURE — 27201423 OPTIME MED/SURG SUP & DEVICES STERILE SUPPLY: Performed by: INTERNAL MEDICINE

## 2020-03-10 PROCEDURE — 88307 TISSUE EXAM BY PATHOLOGIST: CPT | Performed by: PATHOLOGY

## 2020-03-10 PROCEDURE — 88342 CHG IMMUNOCYTOCHEMISTRY: ICD-10-PCS | Mod: 26,,, | Performed by: PATHOLOGY

## 2020-03-10 PROCEDURE — 25000003 PHARM REV CODE 250: Performed by: INTERNAL MEDICINE

## 2020-03-10 PROCEDURE — 3075F SYST BP GE 130 - 139MM HG: CPT | Mod: CPTII,S$GLB,, | Performed by: INTERNAL MEDICINE

## 2020-03-10 PROCEDURE — 88307 PR  SURG PATH,LEVEL V: ICD-10-PCS | Mod: 26,,, | Performed by: PATHOLOGY

## 2020-03-10 PROCEDURE — 83735 ASSAY OF MAGNESIUM: CPT

## 2020-03-10 PROCEDURE — 3078F PR MOST RECENT DIASTOLIC BLOOD PRESSURE < 80 MM HG: ICD-10-PCS | Mod: CPTII,S$GLB,, | Performed by: INTERNAL MEDICINE

## 2020-03-10 PROCEDURE — 85025 COMPLETE CBC W/AUTO DIFF WBC: CPT

## 2020-03-10 PROCEDURE — 3008F BODY MASS INDEX DOCD: CPT | Mod: CPTII,S$GLB,, | Performed by: INTERNAL MEDICINE

## 2020-03-10 PROCEDURE — 99999 PR PBB SHADOW E&M-EST. PATIENT-LVL IV: ICD-10-PCS | Mod: PBBFAC,,, | Performed by: INTERNAL MEDICINE

## 2020-03-10 PROCEDURE — 3008F PR BODY MASS INDEX (BMI) DOCUMENTED: ICD-10-PCS | Mod: CPTII,S$GLB,, | Performed by: INTERNAL MEDICINE

## 2020-03-10 PROCEDURE — 36415 COLL VENOUS BLD VENIPUNCTURE: CPT

## 2020-03-10 PROCEDURE — 88307 TISSUE EXAM BY PATHOLOGIST: CPT | Mod: 26,,, | Performed by: PATHOLOGY

## 2020-03-10 PROCEDURE — 3078F DIAST BP <80 MM HG: CPT | Mod: CPTII,S$GLB,, | Performed by: INTERNAL MEDICINE

## 2020-03-10 PROCEDURE — C1894 INTRO/SHEATH, NON-LASER: HCPCS | Performed by: INTERNAL MEDICINE

## 2020-03-10 PROCEDURE — 99999 PR PBB SHADOW E&M-EST. PATIENT-LVL IV: CPT | Mod: PBBFAC,,, | Performed by: INTERNAL MEDICINE

## 2020-03-10 PROCEDURE — 80197 ASSAY OF TACROLIMUS: CPT

## 2020-03-10 PROCEDURE — 93505 ENDOMYOCARDIAL BIOPSY: CPT | Performed by: INTERNAL MEDICINE

## 2020-03-10 PROCEDURE — 3075F PR MOST RECENT SYSTOLIC BLOOD PRESS GE 130-139MM HG: ICD-10-PCS | Mod: CPTII,S$GLB,, | Performed by: INTERNAL MEDICINE

## 2020-03-10 PROCEDURE — 76932 ECHO GUIDE FOR HEART BIOPSY: CPT | Performed by: INTERNAL MEDICINE

## 2020-03-10 PROCEDURE — 76932 ECHO GUIDE FOR HEART BIOPSY: CPT | Mod: 26,,, | Performed by: INTERNAL MEDICINE

## 2020-03-10 RX ORDER — SODIUM CHLORIDE 0.9 G/100ML
IRRIGANT IRRIGATION
Status: DISCONTINUED | OUTPATIENT
Start: 2020-03-10 | End: 2020-03-10 | Stop reason: HOSPADM

## 2020-03-10 RX ORDER — LIDOCAINE HYDROCHLORIDE 20 MG/ML
INJECTION, SOLUTION INFILTRATION; PERINEURAL
Status: DISCONTINUED | OUTPATIENT
Start: 2020-03-10 | End: 2020-03-10 | Stop reason: HOSPADM

## 2020-03-10 NOTE — PROGRESS NOTES
Subjective:   Ms. Navas is a 49 y.o. year old White female who received a donation after brain death heart transplant on 12/16/19.      CMV status:   Donor: +  Recipient: -    HPI     Recently discharged from the hospital after heart transplantation. Renal faliure and tremors are issues.    Doing much better     Prograf 2/3 mg  Cell Cept 500 mg bid, Prednisone 5 mg    Lymphocele      HPI: 50yo F s/p OHTx on 12/16/2019 for NICM/ HFrEF with HM3 on 9/2019, removed at time of transplant, HLD, obesity, and OA, who presented for worsening nausea and continued dyspnea on exertion. The pathology of native heart revealed non-caseating granulomas typical of sarcoidosis. Was recently admitted with similar symptoms/GLO in which she was diuresed and BP medications adjusted with improvement in renal function.  RHC on 1/7 with RA 12, wedge 15 and high CO/CI. TTE revealed LVEF 65% and normal RV function, mild MR, mod-sev TR, IVC not seen; no pericardial effusion appreciated. She also had significant tremors on admission and theophylline levels were found to be severely elevated so it was discontinued, with some improvement. EMB from 12/31 and 1/7 were both negative for Ab or cell-mediated rejection. She states that she did OK when initially discharged but has been getting progressively more nauseous over the last few days. Denies any sick contacts. No diarrhea. Vomiting occasionally but mostly dry heaves. She also still complains of dyspnea with minimal exertion which has not imrpoved since surgery.        Hospital Course: Pt was admitted and gently diuresed with IVP Lasix. In addition, MMF dose was decreased and Bactrim was stopped. Her nausea improved rapidly and she continued to work with PT/OT. She was transitioned to terbutaline from theophylline which she tolerated well. Her dyspnea improved as well throughout stay. She was given dose of pentamidine with plans to start dapsone as an outpt. She was discharged home in good  condition with plans to f/u with biopsy tomorrow.         January 28 2020    · HARSH Bx Study.  · Normal left ventricular systolic function. The estimated ejection fraction is 63%.  · Septal wall has abnormal motion.  · Indeterminate left ventricular diastolic function.  · Mild right ventricular enlargement.  · Low normal right ventricular systolic function.  · Mild mitral regurgitation.  · Mild to moderate tricuspid regurgitation.  · Mild pulmonic regurgitation.  · Intermediate central venous pressure (8 mmHg).  · The estimated PA systolic pressure is 44 mmHg.  · Pulmonary hypertension present      · Post cardiac transplantation study - OHTx 12/16/19  · Normal left ventricular systolic function. The estimated ejection fraction is 65%.  · Normal right ventricular systolic function.  · Normal LV diastolic function.  · Mild tricuspid regurgitation.  · The estimated PA systolic pressure is 35 mmHg.  · Normal central venous pressure (3 mmHg).  · No evident complications following endomyocardial biopsy.      Hospital Course: She was started on IV diuretic and low dobutamine due to concern for RV failure. Repeat RHC on 1/7 with upper normal filling pressures (RA 12, wedge 15) and high CO/CI. TTE revealed LVEF 65% and normal RV function, mild MR, mod-sev TR, IVC not seen; no pericardial effusion appreciated. She was switched to PO furosemide and her renal function improved slightly (last creat 2.3, from 3.2). She also had significant tremors on admission and theophylline levels were found to be severely elevated so it was discontinued, with some improvement. Tacrolimus dose was adjusted according to levels for a goal of 8, last level today is 5.6. BP was above goal and she was started on amlodipine and hydralazine with good response. EMB from 12/31 and 1/7 were both negative for Ab or cell-mediated rejection. There was also concern for increasing size of R groin hematoma that was first noted post-transplant, CTS evaluated  "patient and recommended conservative management and observation. Patient is currently stable to be discharged home and follow-up as outpatient; she has an appointment for her next EMB on 1/14/2020          0 Review of Systems   Constitution: Negative for chills, decreased appetite, diaphoresis, fever, malaise/fatigue, night sweats, weight gain and weight loss.   Cardiovascular: Negative for chest pain, claudication, cyanosis, dyspnea on exertion, irregular heartbeat, leg swelling, near-syncope, orthopnea and palpitations.   Respiratory: Negative for cough, hemoptysis, shortness of breath, sleep disturbances due to breathing, snoring, sputum production and wheezing.    Gastrointestinal: Negative for abdominal pain and diarrhea.   Neurological: Negative for weakness.       Objective:   Blood pressure 131/77, pulse 97, height 5' 7" (1.702 m), weight 95.8 kg (211 lb 3.2 oz).body mass index is 33.08 kg/m².    Physical Exam   Constitutional: She is oriented to person, place, and time. She appears well-developed and well-nourished.   HENT:   Head: Normocephalic and atraumatic.   Eyes: Pupils are equal, round, and reactive to light. Conjunctivae and EOM are normal.   Neck: Neck supple. No JVD present. No tracheal deviation present. No thyromegaly present.   Cardiovascular: Normal rate, regular rhythm and normal heart sounds. Exam reveals no gallop and no friction rub.   No murmur heard.  Pulmonary/Chest: Effort normal and breath sounds normal. No respiratory distress. She has no wheezes. She has no rales. She exhibits no tenderness.   Abdominal: Soft. Bowel sounds are normal. She exhibits no distension and no mass. There is no tenderness. There is no rebound and no guarding.   Musculoskeletal: Normal range of motion. She exhibits no edema or tenderness.   Lymphadenopathy:     She has no cervical adenopathy.   Neurological: She is alert and oriented to person, place, and time. She has normal reflexes. No cranial nerve " deficit. She exhibits normal muscle tone. Coordination normal.   Skin: Skin is warm and dry.   Psychiatric: She has a normal mood and affect. Her behavior is normal. Thought content normal.       Lab Results   Component Value Date    WBC 4.81 03/10/2020    HGB 9.8 (L) 03/10/2020    HCT 34.3 (L) 03/10/2020    MCV 91 03/10/2020     (H) 03/10/2020    CO2 24 03/10/2020    CREATININE 1.7 (H) 03/10/2020    CALCIUM 9.9 03/10/2020    ALBUMIN 4.1 03/10/2020    AST 14 03/10/2020     (H) 03/10/2020    ALT 12 03/10/2020       Lab Results   Component Value Date    INR 1.2 12/23/2019    INR 1.4 (H) 12/22/2019    INR 1.3 (H) 12/21/2019       BNP   Date Value Ref Range Status   03/10/2020 157 (H) 0 - 99 pg/mL Final     Comment:     Values of less than 100 pg/ml are consistent with non-CHF populations.   02/24/2020 278 (H) 0 - 99 pg/mL Final     Comment:     Values of less than 100 pg/ml are consistent with non-CHF populations.   02/17/2020 312 (H) 0 - 99 pg/mL Final     Comment:     Values of less than 100 pg/ml are consistent with non-CHF populations.       LD   Date Value Ref Range Status   12/16/2019 240 110 - 260 U/L Final     Comment:     Results are increased in hemolyzed samples.   12/15/2019 246 110 - 260 U/L Final     Comment:     Results are increased in hemolyzed samples.   12/14/2019 257 110 - 260 U/L Final     Comment:     Results are increased in hemolyzed samples.       Tacrolimus Lvl   Date Value Ref Range Status   02/24/2020 9.6 5.0 - 15.0 ng/mL Final     Comment:     Testing performed by Liquid Chromatography-Tandem  Mass Spectrometry (LC-MS/MS).  This test was developed and its performance characteristics  determined by [facility], Department of Pathology  and Laboratory Medicine in a manner consistent with CLIA  requirements. It has not been cleared or approved by the [place]  Food and Drug Administration.  This test is used for clinical   purposes.  It should not be regarded as  investigational or for  research.       No results found for: SIROLIMUS  No results found for: CYCLOSPORINE    No results found for this or any previous visit.  No results found for this or any previous visit.    Labs were reviewed with the patient.    Assessment:     1. Heart transplanted    2. Stage 2 chronic kidney disease    3. Immunosuppression    4. 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 Adverse effect of adrenal cortical steroids, sequela        Plan:   Routine follow up. Progressing well  Tremor better off terbutaline     Lymphocele needs removal      Return instructions as set forth by post transplant schedule or as needed:    Clinic: Return for labs and/or biopsy weekly the first month, every two weeks during month 2 and then monthly for the first year at the provider or coordinator's discretion. During the second year, return to clinic every 3 months. Post transplant year 3-5 return every 6 months. There will be a comprehensive post transplant evaluation every year that may include LHC/RHC/biopsy, stress test, echo, CXR, and other health screening exams.    In addition to the clinical assessment, I have ordered Allomap testing for this patient to assist in identification of moderate/severe acute cellular rejection (ACR) in a pt with stable Allograft function instead of endomyocardial biopsy.     Patient is reminded to call with any health changes as these can be early signs of transplant complications. Patient is advised to make sure any new medications or changes of old medications are discussed with a pharmacist or physician knowledgeable with transplant to avoid rejection/drug toxicity related to significant drug interactions.    UNOS Patient Status  Functional Status: 80% - Normal activity with effort: some symptoms of disease  Physical Capacity: Limited Mobility  Working for Income: No  If no, reason not working: Unknown    Johny Zarate MD

## 2020-03-10 NOTE — BRIEF OP NOTE
Admit 03/10/2020  Discharge  03/10/2020  Discharge / Principle diagnosis heart transplant .    Discharge attending Jolene Lua MD  Hospital course.  Came in for echo biopsy. Tolerated procedure well (see full results in cardiac procedure section).  Results discussed with patient / caregiver.   Discharge condition / disposition Good / home   discharge activity activity as tolerated    Discharge diet diet as tolerated / unchanged from admission  Discharge medication   No current facility-administered medications on file prior to encounter.      Current Outpatient Medications on File Prior to Encounter   Medication Sig    aspirin (ECOTRIN) 81 MG EC tablet Take 1 tablet (81 mg total) by mouth once daily.    atorvastatin (LIPITOR) 20 MG tablet Take 1 tablet (20 mg total) by mouth once daily.    calcium carbonate-vitamin D3 1,000 mg(2,500 mg)-800 unit Tab Take 1 tablet by mouth once daily.    ferrous sulfate (FEOSOL) 325 mg (65 mg iron) Tab tablet Take 1 tablet (325 mg total) by mouth 2 (two) times daily.    gabapentin (NEURONTIN) 300 MG capsule Take 1 capsule (300 mg total) by mouth every evening.    magnesium oxide (MAG-OX) 400 mg (241.3 mg magnesium) tablet Take 1 tablet (400 mg total) by mouth once daily.    oxyCODONE (ROXICODONE) 10 mg Tab immediate release tablet Take 1 tablet (10 mg total) by mouth every 8 (eight) hours as needed.    senna-docusate 8.6-50 mg (PERICOLACE) 8.6-50 mg per tablet Take 2 tablets by mouth 2 (two) times daily as needed for Constipation.    SITagliptin (JANUVIA) 50 MG Tab Take 1 tablet (50 mg total) by mouth once daily.    venlafaxine (EFFEXOR-XR) 150 MG Cp24 Take 1 capsule (150 mg total) by mouth once daily.     Followup in clinic as scheduled

## 2020-03-10 NOTE — H&P
Subjective:      Deborah Navas is a 49 y.o. female with a who needs echo biopsy for evaluation of cardiac rejection after 12/16/19 OHT.  Currently able to lay flat.    Past Medical History:   Diagnosis Date    Fractures     History of ventricular fibrillation 9/4/2019    History of ventricular tachycardia 9/4/2019    Hyperlipidemia     Migraine     Multinodular goiter 9/5/2019    Osteoarthritis     Restrictive cardiomyopathy post heart transplant      Past Surgical History:   Procedure Laterality Date    BACK SURGERY      2007    BIOPSY WITH ULTRASOUND GUIDANCE N/A 12/31/2019    Procedure: BIOPSY, WITH US GUIDANCE;  Surgeon: Eric Antunez Jr., MD;  Location: [facility] CATH LAB;  Service: Cardiology;  Laterality: N/A;    BIOPSY WITH ULTRASOUND GUIDANCE N/A 1/7/2020    Procedure: BIOPSY, WITH US GUIDANCE;  Surgeon: Renetta Chaudhari MD;  Location: [facility] CATH LAB;  Service: Cardiology;  Laterality: N/A;    BIOPSY WITH ULTRASOUND GUIDANCE N/A 1/14/2020    Procedure: BIOPSY, WITH US GUIDANCE;  Surgeon: Renetta Chaudhari MD;  Location: [facility] CATH LAB;  Service: Cardiology;  Laterality: N/A;    BIOPSY WITH ULTRASOUND GUIDANCE N/A 1/28/2020    Procedure: BIOPSY, WITH US GUIDANCE;  Surgeon: Jolene Lua MD;  Location: [facility] CATH LAB;  Service: Cardiology;  Laterality: N/A;    BIOPSY WITH ULTRASOUND GUIDANCE N/A 2/11/2020    Procedure: BIOPSY, WITH US GUIDANCE;  Surgeon: Renetta Chaudhari MD;  Location: [facility] CATH LAB;  Service: Cardiology;  Laterality: N/A;    BONE GRAFT Left     from Left hip to Left FA    eardrum reconstruction  1980    ELBOW SURGERY Left 6029-2772    FOREARM FRACTURE SURGERY Bilateral 8427-4462    multiple surgeries    HEART TRANSPLANT N/A 12/16/2019    Procedure: TRANSPLANT, HEART;  Surgeon: Talha Nuñez MD;  Location: [address];  Service: Cardiothoracic;  Laterality: N/A;    INSERTION OF GRAFT TO PERICARDIUM  9/10/2019    Procedure: INSERTION, GRAFT, PERICARDIUM;   Surgeon: Shar Walden MD;  Location: [address];  Service: Cardiovascular;;    INSERTION OF IMPLANTABLE CARDIOVERTER-DEFIBRILLATOR (ICD) GENERATOR WITH TWO EXISTING LEADS      INSERTION OF PACEMAKER Left     LEFT VENTRICULAR ASSIST DEVICE N/A 9/10/2019    Procedure: INSERTION-LEFT VENTRICULAR ASSIST DEVICE;  Surgeon: Shar Walden MD;  [facility];  Service: Cardiovascular;  Laterality: N/A;  DT HM3     LUMBAR FUSION  2007    L4-L5    RIGHT HEART CATHETERIZATION Right 9/4/2019    Procedure: INSERTION, CATHETER, RIGHT HEART;  Surgeon: Vi Bryant MD;  Location: [facility] CATH LAB;  Service: Cardiology;  Laterality: Right;    RIGHT HEART CATHETERIZATION N/A 11/18/2019    Procedure: INSERTION, CATHETER, RIGHT HEART;  Surgeon: Eric Antunez Jr., MD;  Location: [facility] CATH LAB;  Service: Cardiology;  Laterality: N/A;    RIGHT HEART CATHETERIZATION Right 12/31/2019    Procedure: INSERTION, CATHETER, RIGHT HEART;  Surgeon: Eric Antunez Jr., MD;  Location: [facility] CATH LAB;  Service: Cardiology;  Laterality: Right;    RIGHT HEART CATHETERIZATION Right 1/7/2020    Procedure: INSERTION, CATHETER, RIGHT HEART;  Surgeon: Renetta Chaudhari MD;  Location: [facility] CATH LAB;  Service: Cardiology;  Laterality: Right;    SINUS SURGERY Right 1994    with lymph nodes    STERNAL WOUND CLOSURE  9/10/2019    Procedure: CLOSURE, WOUND, STERNUM;  Surgeon: Shar Walden MD;  Location: [address];  Service: Cardiovascular;;    TEMPOROMANDIBULAR JOINT SURGERY Right 1988    TONSILLECTOMY      TREATMENT OF CARDIAC ARRHYTHMIA N/A 9/24/2019    Procedure: CARDIOVERSION;  Surgeon: Moe Barrera MD;  Location: [facility] EP LAB;  Service: Cardiology;  Laterality: N/A;  AF, DCCV/NADINE, anes, DM, Rm 3073    TYMPANOSTOMY TUBE PLACEMENT  1971- 1979    multiple tube placements     Social History     Socioeconomic History    Marital status: Single     Spouse name: Not on file    Number of children: Not on file     Years of education: Not on file    Highest education level: Not on file   Occupational History    Not on file   Social Needs    Financial resource strain: Not on file    Food insecurity:     Worry: Not on file     Inability: Not on file    Transportation needs:     Medical: Not on file     Non-medical: Not on file   Tobacco Use    Smoking status: Never Smoker    Smokeless tobacco: Never Used   Substance and Sexual Activity    Alcohol use: No    Drug use: No    Sexual activity: Not Currently   Lifestyle    Physical activity:     Days per week: Not on file     Minutes per session: Not on file    Stress: Not on file   Relationships    Social connections:     Talks on phone: Not on file     Gets together: Not on file     Attends [employer] service: Not on file     Active member of club or organization: Not on file     Attends meetings of clubs or organizations: Not on file     Relationship status: Not on file   Other Topics Concern    Not on file   Social History Narrative    Not on file     Family History   Problem Relation Age of Onset    Osteoarthritis Mother     Migraines Mother     Osteoarthritis Maternal Grandmother     Migraines Maternal Grandmother     Broken bones Maternal Grandmother     Osteoporosis Maternal Grandmother     Dislocations Maternal Grandmother     Scoliosis Maternal Grandmother     Osteoarthritis Paternal Grandmother     Cancer Paternal Grandmother         leukemia    Migraines Paternal Grandmother     Obesity Paternal Grandmother     Osteoarthritis Paternal Grandfather     Heart failure Paternal Grandfather     Migraines Paternal Grandfather     Heart failure Maternal Grandfather     Migraines Maternal Grandfather     Osteoarthritis Maternal Grandfather     Stroke Father     Osteoarthritis Father            Other significant clinical information:  Review of patient's allergies indicates:   Allergen Reactions    Adhesive Blisters     Reaction to area in chest  and up only    Codeine Itching     No current facility-administered medications on file prior to encounter.      Current Outpatient Medications on File Prior to Encounter   Medication Sig    aspirin (ECOTRIN) 81 MG EC tablet Take 1 tablet (81 mg total) by mouth once daily.    atorvastatin (LIPITOR) 20 MG tablet Take 1 tablet (20 mg total) by mouth once daily.    calcium carbonate-vitamin D3 1,000 mg(2,500 mg)-800 unit Tab Take 1 tablet by mouth once daily.    ferrous sulfate (FEOSOL) 325 mg (65 mg iron) Tab tablet Take 1 tablet (325 mg total) by mouth 2 (two) times daily.    gabapentin (NEURONTIN) 300 MG capsule Take 1 capsule (300 mg total) by mouth every evening.    magnesium oxide (MAG-OX) 400 mg (241.3 mg magnesium) tablet Take 1 tablet (400 mg total) by mouth once daily.    oxyCODONE (ROXICODONE) 10 mg Tab immediate release tablet Take 1 tablet (10 mg total) by mouth every 8 (eight) hours as needed.    senna-docusate 8.6-50 mg (PERICOLACE) 8.6-50 mg per tablet Take 2 tablets by mouth 2 (two) times daily as needed for Constipation.    SITagliptin (JANUVIA) 50 MG Tab Take 1 tablet (50 mg total) by mouth once daily.    venlafaxine (EFFEXOR-XR) 150 MG Cp24 Take 1 capsule (150 mg total) by mouth once daily.            Objective:      Physical Exam     / 81 P 90       General Appearance:    Alert, cooperative, no distress, appears stated age   Head:    Normocephalic, without obvious abnormality, atraumatic   Eyes:    PERRL, conjunctiva/corneas clear, EOM's intact, fundi     benign, both eyes   Ears:    Normal TM's and external ear canals, both ears   Nose:   Nares normal, septum midline, mucosa normal, no drainage    or sinus tenderness   Throat:   Lips, mucosa, and tongue normal; teeth and gums normal   Neck:   Supple, symmetrical, trachea midline, no adenopathy;     thyroid:  no enlargement/tenderness/nodules; no carotid    bruit or JVD   Back:     Symmetric, no curvature, ROM normal, no CVA  tenderness   Lungs:     Clear to auscultation bilaterally, respirations unlabored   Chest Wall:    No tenderness or deformity    Heart:    Regular rate and rhythm, S1 and S2 normal, no murmur, rub   or gallop   Breast Exam:    No tenderness, masses, or nipple abnormality   Abdomen:     Soft, non-tender, bowel sounds active all four quadrants,     no masses, no organomegaly   Genitalia:    Normal female without lesion, discharge or tenderness   Rectal:    Normal tone, no masses or tenderness;    guaiac negative stool   Extremities:   Extremities normal, atraumatic, no cyanosis or edema   Pulses:   2+ and symmetric all extremities   Skin:   Skin color, texture, turgor normal, no rashes or lesions   Lymph nodes:   Cervical, supraclavicular, and axillary nodes normal   Neurologic:   CNII-XII intact, normal strength, sensation and reflexes     throughout         Lab Review   Lab Results   Component Value Date    WBC 3.00 (L) 02/24/2020    HGB 8.9 (L) 02/24/2020    HCT 31.2 (L) 02/24/2020    MCV 92 02/24/2020     02/24/2020     Lab Results   Component Value Date    INR 1.2 12/23/2019    INR 1.4 (H) 12/22/2019    INR 1.3 (H) 12/21/2019           Assessment:       Plan:     I have explained the risks, benefits, and alternatives of the procedure in detail.  The patient expresses understanding and all questions have been answered.  The patient agrees to the proceed as planned.   echo biopsy via RIJ   Micropuncture access needle will be used to minimize bleeding risk.

## 2020-03-10 NOTE — DISCHARGE INSTRUCTIONS

## 2020-03-10 NOTE — Clinical Note
Biopsy device collected a biopsy of the RV under echocardiographic guidance. Biopsy sample count is 6.

## 2020-03-10 NOTE — LETTER
March 10, 2020        Joaquin Del Toro  [address]  [address]  [facility] [place] [address]  Phone: [phone]  Fax: [phone]             [facility] [address]  [facility] [address]  Phone: [phone]   Patient: Deborah Navas   MR Number: [id number]   YOB: 1970   Date of Visit: 3/10/2020       Dear Dr. Joaquin Del Toro    Thank you for referring Deborah Navas to me for evaluation. Attached you will find relevant portions of my assessment and plan of care.    If you have questions, please do not hesitate to call me. I look forward to following Deborah Navas along with you.    Sincerely,    Johny Zarate MD    Enclosure    If you would like to receive this communication electronically, please contact [email or web address] or [phone] to request [employer] Link access.    [employer] Link is a tool which provides read-only access to select patient information with whom you have a relationship. Its easy to use and provides real time access to review your patients record including encounter summaries, notes, results, and demographic information.    If you feel you have received this communication in error or would no longer like to receive these types of communications, please e-mail [email or web address]

## 2020-03-11 LAB
FINAL PATHOLOGIC DIAGNOSIS: NORMAL
GROSS: NORMAL
MICROSCOPIC EXAM: NORMAL

## 2020-03-12 ENCOUNTER — CONFERENCE (OUTPATIENT)
Dept: TRANSPLANT | Facility: CLINIC | Age: 50
End: 2020-03-12

## 2020-03-12 NOTE — TELEPHONE ENCOUNTER
S/p OHTx 12/16/2019. Doing well. Allomap 30. Normal EF/ Low risk for rejection. Tacro 2/3 (7.8 level), cellcept 500 mg BID and prednisone 5 mg. In view of recent diagnosis of sarcoidosis recommend continuing prednisone.

## 2020-03-13 ENCOUNTER — TELEPHONE (OUTPATIENT)
Dept: SLEEP MEDICINE | Facility: OTHER | Age: 50
End: 2020-03-13

## 2020-03-13 DIAGNOSIS — I89.8 LYMPHOCELE AFTER SURGICAL PROCEDURE: Primary | ICD-10-CM

## 2020-03-13 DIAGNOSIS — T81.89XA LYMPHOCELE AFTER SURGICAL PROCEDURE: Primary | ICD-10-CM

## 2020-03-13 NOTE — PROGRESS NOTES
VASCULAR SURGERY NOTE    Patient ID: Deborah Navas is a 49 y.o. female.    I. HISTORY     Chief Complaint: Lymphocele     HPI: Deborah Navas is a 49 y.o. female who is here today for follow up appointment. Patient had a heart transplant on 12/16/2020 due to restrictive cardiomyopathy. Since 2 weeks after surgery patient developed inflammation and edema of the right inguinal area. Dr Torres drained the collection a week ago but the fluid re-acommulated in 48 hours. Patient had an US that showed Persistent complex fluid collections in the subcutaneous tissues of the right groin deep to a surgical incision, favored to represent subcutaneous lymphoceles. No pain, numbness in the area, no redness, swelling or signs of infection. I saw her in clinic 4 weeks ago and asked her to return at 3 months post op from OHT.    Since her list visit she tried compression garments but did not have any improvement and had discomfort when she wore compression over the swollen lymphocele. She denies any signs/symptoms of infection. The discomfort at the area of swelling is largely dependent on what kind of clothing she wears. If nothing is compressing the area then it does not cause significant pain.      Past Medical History:   Diagnosis Date    Fractures     History of ventricular fibrillation 9/4/2019    History of ventricular tachycardia 9/4/2019    Hyperlipidemia     Migraine     Multinodular goiter 9/5/2019    Osteoarthritis     Restrictive cardiomyopathy post heart transplant         Past Surgical History:   Procedure Laterality Date    BACK SURGERY      2007    BIOPSY WITH ULTRASOUND GUIDANCE N/A 12/31/2019    Procedure: BIOPSY, WITH US GUIDANCE;  Surgeon: Eric Antunez Jr., MD;  Location: [facility] CATH LAB;  Service: Cardiology;  Laterality: N/A;    BIOPSY WITH ULTRASOUND GUIDANCE N/A 1/7/2020    Procedure: BIOPSY, WITH US GUIDANCE;  Surgeon: Renetta Chaudhari MD;  Location: [facility] CATH LAB;   Service: Cardiology;  Laterality: N/A;    BIOPSY WITH ULTRASOUND GUIDANCE N/A 1/14/2020    Procedure: BIOPSY, WITH US GUIDANCE;  Surgeon: Renetta Chaudhari MD;  Location: [facility] CATH LAB;  Service: Cardiology;  Laterality: N/A;    BIOPSY WITH ULTRASOUND GUIDANCE N/A 1/28/2020    Procedure: BIOPSY, WITH US GUIDANCE;  Surgeon: Jolene Lua MD;  Location: [facility] CATH LAB;  Service: Cardiology;  Laterality: N/A;    BIOPSY WITH ULTRASOUND GUIDANCE N/A 2/11/2020    Procedure: BIOPSY, WITH US GUIDANCE;  Surgeon: Renetta Chaudhari MD;  Location: [facility] CATH LAB;  Service: Cardiology;  Laterality: N/A;    BIOPSY WITH ULTRASOUND GUIDANCE N/A 3/10/2020    Procedure: BIOPSY, WITH US GUIDANCE;  Surgeon: Jolene Lua MD;  Location: [facility] CATH LAB;  Service: Cardiology;  Laterality: N/A;    BONE GRAFT Left     from Left hip to Left FA    eardrum reconstruction  1980    ELBOW SURGERY Left [id number]    FOREARM FRACTURE SURGERY Bilateral [id number]    multiple surgeries    HEART TRANSPLANT N/A 12/16/2019    Procedure: TRANSPLANT, HEART;  Surgeon: Talha Nuñez MD;  Location: [address];  Service: Cardiothoracic;  Laterality: N/A;    INSERTION OF GRAFT TO PERICARDIUM  9/10/2019    Procedure: INSERTION, GRAFT, PERICARDIUM;  Surgeon: Shar Walden MD;  Location: [facility] OR [address] [place] [address];  Service: Cardiovascular;;    INSERTION OF IMPLANTABLE CARDIOVERTER-DEFIBRILLATOR (ICD) GENERATOR WITH TWO EXISTING LEADS      INSERTION OF PACEMAKER Left     LEFT VENTRICULAR ASSIST DEVICE N/A 9/10/2019    Procedure: INSERTION-LEFT VENTRICULAR ASSIST DEVICE;  Surgeon: Shar Walden MD;  [facility];  Service: Cardiovascular;  Laterality: N/A;  DT HM3     LUMBAR FUSION  2007    L4-L5    RIGHT HEART CATHETERIZATION Right 9/4/2019    Procedure: INSERTION, CATHETER, RIGHT HEART;  Surgeon: Vi Bryant MD;  Location: [facility] CATH LAB;  Service: Cardiology;  Laterality: Right;    RIGHT HEART CATHETERIZATION N/A 11/18/2019     Procedure: INSERTION, CATHETER, RIGHT HEART;  Surgeon: Eric Antunez Jr., MD;  Location: [facility] CATH LAB;  Service: Cardiology;  Laterality: N/A;    RIGHT HEART CATHETERIZATION Right 12/31/2019    Procedure: INSERTION, CATHETER, RIGHT HEART;  Surgeon: Eric Antunez Jr., MD;  Location: [facility] CATH LAB;  Service: Cardiology;  Laterality: Right;    RIGHT HEART CATHETERIZATION Right 1/7/2020    Procedure: INSERTION, CATHETER, RIGHT HEART;  Surgeon: Renetta Chaudhari MD;  Location: [facility] CATH LAB;  Service: Cardiology;  Laterality: Right;    SINUS SURGERY Right 1994    with lymph nodes    STERNAL WOUND CLOSURE  9/10/2019    Procedure: CLOSURE, WOUND, STERNUM;  Surgeon: Shar Walden MD;  Location: [facility] OR [address] [place] [address];  Service: Cardiovascular;;    TEMPOROMANDIBULAR JOINT SURGERY Right 1988    TONSILLECTOMY      TREATMENT OF CARDIAC ARRHYTHMIA N/A 9/24/2019    Procedure: CARDIOVERSION;  Surgeon: Moe Barrera MD;  Location: [facility] EP LAB;  Service: Cardiology;  Laterality: N/A;  AF, DCCV/NADINE, anes, DM, Rm 3073    TYMPANOSTOMY TUBE PLACEMENT  1971- 1979    multiple tube placements       Social History     Tobacco Use   Smoking Status Never Smoker   Smokeless Tobacco Never Used        Review of Systems   Constitution: Negative for chills and decreased appetite.   HENT: Negative for ear discharge and ear pain.    Eyes: Negative for blurred vision and discharge.   Cardiovascular: Negative for chest pain and claudication.   Respiratory: Negative for cough and shortness of breath.    Hematologic/Lymphatic: Negative for adenopathy and bleeding problem.   Musculoskeletal: Negative for arthritis and back pain.   Gastrointestinal: Negative for bloating and abdominal pain.         II. PHYSICAL EXAM     Physical Exam   Constitutional: She is oriented to person, place, and time. She appears well-developed.   Neck: Normal range of motion.   Cardiovascular: Normal rate, regular rhythm and intact distal pulses.    Pulmonary/Chest: Effort normal and breath sounds normal.   Abdominal: Soft. Bowel sounds are normal.   Musculoskeletal: Normal range of motion. She exhibits no edema.   Neurological: She is alert and oriented to person, place, and time.   EXT: longitudinal scar over R groin well-healed, Fluid collection in the right inguinal area, 7cm, mobile, nontender, above previous incision, no erythema, healthy-appearing skin  LLE: Triphasic DP   RLE: Triphasic PT    III. ASSESSMENT & PLAN (MEDICAL DECISION MAKING)     No diagnosis found.    Imaging Results:  Ultrasound R groin 3/16/20:   Fluid collection measuring 10 x 6.9 x 9.5cm with no associated vascular structures.     Assessment/Diagnosis and Plan:  49 y.o. female s/p heart transplant 12/16/20 now with right grion lymphocele/seroma at right groin open cannulation site not improved after percutaneous drainage and compression therapy. The lymphocele has persisted at the same or slightly larger size. In the midst of the COVID-19 pandemic we are not performing any elective surgeries at this time particularly in high risk patients like Ms Navas. Will have her follow up in 1-2 months for re-evaluation and surgical resection/repair of her lymphocele/seroma.    - Patient is 3 months postop, no improvement with medical therapy. Will plan for surgical treatment. However, given the current situation of coronavirus and the fact that patient is immunocompromissed, will have to delay surgery  - Patient was advised to continue wearing compression socks 15-20mmHg.   - Plan was discussed with patient, questions were answered. Consent was signed in clinic.       NYDIA Bledsoe II, MD, VI  Vascular Surgeon  [facility]

## 2020-03-15 ENCOUNTER — SOCIAL WORK (OUTPATIENT)
Dept: TRANSPLANT | Facility: HOSPITAL | Age: 50
End: 2020-03-15

## 2020-03-16 ENCOUNTER — PATIENT MESSAGE (OUTPATIENT)
Dept: TRANSPLANT | Facility: CLINIC | Age: 50
End: 2020-03-16

## 2020-03-16 ENCOUNTER — OFFICE VISIT (OUTPATIENT)
Dept: VASCULAR SURGERY | Facility: CLINIC | Age: 50
End: 2020-03-16
Attending: SURGERY
Payer: COMMERCIAL

## 2020-03-16 ENCOUNTER — HOSPITAL ENCOUNTER (OUTPATIENT)
Dept: VASCULAR SURGERY | Facility: CLINIC | Age: 50
Discharge: HOME | End: 2020-03-16
Attending: SURGERY
Payer: COMMERCIAL

## 2020-03-16 VITALS
SYSTOLIC BLOOD PRESSURE: 126 MMHG | HEIGHT: 67 IN | DIASTOLIC BLOOD PRESSURE: 78 MMHG | HEART RATE: 99 BPM | WEIGHT: 202.81 LBS | TEMPERATURE: 99 F | BODY MASS INDEX: 31.83 KG/M2

## 2020-03-16 DIAGNOSIS — T81.89XA LYMPHOCELE AFTER SURGICAL PROCEDURE: ICD-10-CM

## 2020-03-16 DIAGNOSIS — I89.8 LYMPHOCELE AFTER SURGICAL PROCEDURE: ICD-10-CM

## 2020-03-16 DIAGNOSIS — I89.8 LYMPHOCELE: Primary | ICD-10-CM

## 2020-03-16 DIAGNOSIS — G47.01 INSOMNIA DUE TO MEDICAL CONDITION: Primary | ICD-10-CM

## 2020-03-16 PROCEDURE — 3074F SYST BP LT 130 MM HG: CPT | Mod: CPTII,S$GLB,, | Performed by: SURGERY

## 2020-03-16 PROCEDURE — 99999 PR PBB SHADOW E&M-EST. PATIENT-LVL IV: ICD-10-PCS | Mod: PBBFAC,,, | Performed by: SURGERY

## 2020-03-16 PROCEDURE — 3008F PR BODY MASS INDEX (BMI) DOCUMENTED: ICD-10-PCS | Mod: CPTII,S$GLB,, | Performed by: SURGERY

## 2020-03-16 PROCEDURE — 3078F DIAST BP <80 MM HG: CPT | Mod: CPTII,S$GLB,, | Performed by: SURGERY

## 2020-03-16 PROCEDURE — 93926 LOWER EXTREMITY STUDY: CPT | Mod: S$GLB,,, | Performed by: SURGERY

## 2020-03-16 PROCEDURE — 3074F PR MOST RECENT SYSTOLIC BLOOD PRESSURE < 130 MM HG: ICD-10-PCS | Mod: CPTII,S$GLB,, | Performed by: SURGERY

## 2020-03-16 PROCEDURE — 99213 OFFICE O/P EST LOW 20 MIN: CPT | Mod: S$GLB,,, | Performed by: SURGERY

## 2020-03-16 PROCEDURE — 3008F BODY MASS INDEX DOCD: CPT | Mod: CPTII,S$GLB,, | Performed by: SURGERY

## 2020-03-16 PROCEDURE — 3078F PR MOST RECENT DIASTOLIC BLOOD PRESSURE < 80 MM HG: ICD-10-PCS | Mod: CPTII,S$GLB,, | Performed by: SURGERY

## 2020-03-16 PROCEDURE — 99999 PR PBB SHADOW E&M-EST. PATIENT-LVL IV: CPT | Mod: PBBFAC,,, | Performed by: SURGERY

## 2020-03-16 PROCEDURE — 99213 PR OFFICE/OUTPT VISIT, EST, LEVL III, 20-29 MIN: ICD-10-PCS | Mod: S$GLB,,, | Performed by: SURGERY

## 2020-03-16 PROCEDURE — 93926 PR DUPLEX LO EXTREM ART UNILAT/LTD: ICD-10-PCS | Mod: S$GLB,,, | Performed by: SURGERY

## 2020-03-16 RX ORDER — SODIUM CHLORIDE 9 MG/ML
INJECTION, SOLUTION INTRAVENOUS CONTINUOUS
Status: CANCELLED | OUTPATIENT
Start: 2020-03-16

## 2020-03-16 RX ORDER — LIDOCAINE HYDROCHLORIDE 10 MG/ML
1 INJECTION, SOLUTION EPIDURAL; INFILTRATION; INTRACAUDAL; PERINEURAL ONCE
Status: CANCELLED | OUTPATIENT
Start: 2020-03-16 | End: 2020-03-16

## 2020-03-16 NOTE — PROGRESS NOTES
Phoned pt at [facility] and advised pt to follow social distancing and hand hygiene when coming for appt/procedure tomorrow, and otherwise for caregiver to do all other errands and pt to remain in apt. Pt states understanding. SW following.

## 2020-03-17 ENCOUNTER — PATIENT MESSAGE (OUTPATIENT)
Dept: TRANSPLANT | Facility: CLINIC | Age: 50
End: 2020-03-17

## 2020-03-17 LAB — HEART CARE KIT (SHORE): NORMAL

## 2020-03-17 RX ORDER — ZOLPIDEM TARTRATE 5 MG/1
5 TABLET ORAL NIGHTLY PRN
Qty: 30 TABLET | Refills: 2 | Status: SHIPPED | OUTPATIENT
Start: 2020-03-17 | End: 2020-04-16

## 2020-03-23 ENCOUNTER — TELEPHONE (OUTPATIENT)
Dept: SLEEP MEDICINE | Facility: OTHER | Age: 50
End: 2020-03-23

## 2020-03-31 ENCOUNTER — PATIENT MESSAGE (OUTPATIENT)
Dept: TRANSPLANT | Facility: CLINIC | Age: 50
End: 2020-03-31

## 2020-03-31 DIAGNOSIS — Z94.1 HEART REPLACED BY TRANSPLANT: Primary | ICD-10-CM

## 2020-04-01 ENCOUNTER — TELEPHONE (OUTPATIENT)
Dept: REHABILITATION | Facility: HOSPITAL | Age: 50
End: 2020-04-01

## 2020-04-01 RX ORDER — DAPSONE 100 MG/1
100 TABLET ORAL DAILY
Qty: 30 TABLET | Refills: 11 | Status: SHIPPED | OUTPATIENT
Start: 2020-04-01 | End: 2020-12-02 | Stop reason: ALTCHOICE

## 2020-04-01 NOTE — TELEPHONE ENCOUNTER
Patient: Deborah Navas  Date: 4/1/2020  MRN: [id number]    Left message for patient regarding continuation of therapy and COVID-19.  Stated that we are taking every precaution to ensure the safety of our patients, staff and community.  In an abundance of caution and in an effort to help reduce risk and limit community spread, we have decided to temporarily postpone appointments for patients who may be at increased risk to attend in-person therapy or where therapy is not critically needed at this time. Informed that we would contact for rescheduling upon resumption of services. Also left clinic phone number.     Javier Maldonado PT  4/1/2020

## 2020-04-03 NOTE — PHYSICIAN QUERY
PT Name: Deborah Navas  MR #: [id number]  Physician Query Form - CKD Clarification     CDS/: Zoltan Jean-Baptiste Jr, RN               Contact information:[email or web address]  This form is a permanent document in the medical record.     Query Date: November 25, 2019    By submitting this query, we are merely seeking further clarification of documentation. Please utilize your independent clinical judgment when addressing the question(s) below.    The Medical record contains the following:     Indicators   Supporting Clinical Findings   Location in Medical Record   x CKD or Chronic Kidney (Renal) Failure / Disease CKD (chronic kidney disease)  - Baseline creat appears 1.3-1.6  - Will monitor with diuresis  11/25 H&P   x BUN/Creatinine                          GFR BUN=19  Creatinine=1.7  GFR=34.7 11/13 Labs    Dehydration      Nausea / Vomiting      Dialysis / CRRT      Medication      Treatment     x Other Chronic Conditions Essential hypertension  -Patient is non-pulsatile 11/14 Heart Transplant Progress Note    Other       Provider, please further specify the stage of CKD.     [employer] Definitions     Stage Description eGFR (mL/min)   [   ]    I Slight kidney damage with normal or increased filtration 90+   [ x  ]   II Mildly reduced kidney function 60-89   [   ]    III Moderately reduced kidney function 30-59   [   ] Other (please specify): ____________    [   ]  Clinically Undetermined          Please document in your progress notes daily for the duration of treatment until resolved and include in your discharge summary.                       PICU Team, Mother

## 2020-04-10 ENCOUNTER — PATIENT MESSAGE (OUTPATIENT)
Dept: TRANSPLANT | Facility: CLINIC | Age: 50
End: 2020-04-10

## 2020-04-13 ENCOUNTER — HOSPITAL ENCOUNTER (OUTPATIENT)
Dept: CARDIOLOGY | Facility: HOSPITAL | Age: 50
Discharge: HOME | End: 2020-04-13
Attending: INTERNAL MEDICINE
Payer: COMMERCIAL

## 2020-04-13 VITALS
DIASTOLIC BLOOD PRESSURE: 78 MMHG | HEART RATE: 85 BPM | WEIGHT: 202 LBS | SYSTOLIC BLOOD PRESSURE: 126 MMHG | HEIGHT: 67 IN | BODY MASS INDEX: 31.71 KG/M2

## 2020-04-13 DIAGNOSIS — Z79.899 ENCOUNTER FOR LONG-TERM (CURRENT) USE OF MEDICATIONS: ICD-10-CM

## 2020-04-13 DIAGNOSIS — T86.298 OTHER COMPLICATION OF HEART TRANSPLANT: ICD-10-CM

## 2020-04-13 DIAGNOSIS — Z94.1 STATUS POST HEART TRANSPLANT: ICD-10-CM

## 2020-04-13 LAB
ASCENDING AORTA: 2.47 CM
AV INDEX (PROSTH): 0.65
AV MEAN GRADIENT: 10 MMHG
AV PEAK GRADIENT: 21 MMHG
AV VALVE AREA: 1.77 CM2
AV VELOCITY RATIO: 0.55
BSA FOR ECHO PROCEDURE: 2.08 M2
CV ECHO LV RWT: 0.36 CM
DOP CALC AO PEAK VEL: 2.31 M/S
DOP CALC AO VTI: 41.85 CM
DOP CALC LVOT AREA: 2.7 CM2
DOP CALC LVOT DIAMETER: 1.86 CM
DOP CALC LVOT PEAK VEL: 1.26 M/S
DOP CALC LVOT STROKE VOLUME: 73.92 CM3
DOP CALCLVOT PEAK VEL VTI: 27.22 CM
E WAVE DECELERATION TIME: 189.42 MSEC
E/A RATIO: 1.49
E/E' RATIO: 13.38 M/S
ECHO LV POSTERIOR WALL: 0.81 CM (ref 0.6–1.1)
FRACTIONAL SHORTENING: 40 % (ref 28–44)
INTERVENTRICULAR SEPTUM: 0.97 CM (ref 0.6–1.1)
IVRT: 65.65 MSEC
LEFT INTERNAL DIMENSION IN SYSTOLE: 2.73 CM (ref 2.1–4)
LEFT VENTRICLE DIASTOLIC VOLUME INDEX: 45.96 ML/M2
LEFT VENTRICLE DIASTOLIC VOLUME: 93.33 ML
LEFT VENTRICLE MASS INDEX: 65 G/M2
LEFT VENTRICLE SYSTOLIC VOLUME INDEX: 13.7 ML/M2
LEFT VENTRICLE SYSTOLIC VOLUME: 27.88 ML
LEFT VENTRICULAR INTERNAL DIMENSION IN DIASTOLE: 4.52 CM (ref 3.5–6)
LEFT VENTRICULAR MASS: 131.81 G
LV LATERAL E/E' RATIO: 10.7 M/S
LV SEPTAL E/E' RATIO: 17.83 M/S
MV PEAK A VEL: 0.72 M/S
MV PEAK E VEL: 1.07 M/S
PISA TR MAX VEL: 2.4 M/S
PULM VEIN S/D RATIO: 0.56
PV PEAK D VEL: 0.89 M/S
PV PEAK S VEL: 0.5 M/S
PV PEAK VELOCITY: 1.16 CM/S
RIGHT VENTRICULAR END-DIASTOLIC DIMENSION: 3.59 CM
SINUS: 2.37 CM
STJ: 2.18 CM
TDI LATERAL: 0.1 M/S
TDI SEPTAL: 0.06 M/S
TDI: 0.08 M/S
TR MAX PG: 23 MMHG

## 2020-04-13 PROCEDURE — 93306 ECHO (CUPID ONLY): ICD-10-PCS | Mod: 26,,, | Performed by: INTERNAL MEDICINE

## 2020-04-13 PROCEDURE — 93306 TTE W/DOPPLER COMPLETE: CPT | Mod: 26,,, | Performed by: INTERNAL MEDICINE

## 2020-04-13 PROCEDURE — 93306 TTE W/DOPPLER COMPLETE: CPT

## 2020-04-14 ENCOUNTER — ANESTHESIA EVENT (OUTPATIENT)
Dept: SURGERY | Facility: HOSPITAL | Age: 50
End: 2020-04-14
Payer: COMMERCIAL

## 2020-04-14 RX ORDER — ZOLPIDEM TARTRATE 5 MG/1
5 TABLET ORAL NIGHTLY
Qty: 30 TABLET | Refills: 2 | Status: SHIPPED | OUTPATIENT
Start: 2020-04-14 | End: 2020-07-23 | Stop reason: SDUPTHER

## 2020-04-14 RX ORDER — FERROUS SULFATE 325(65) MG
TABLET, DELAYED RELEASE (ENTERIC COATED) ORAL
Qty: 60 TABLET | Refills: 0 | Status: SHIPPED | OUTPATIENT
Start: 2020-04-14 | End: 2020-05-11

## 2020-04-14 NOTE — ANESTHESIA PREPROCEDURE EVALUATION
04/14/2020  Deborah Navas is a 49 y.o., female.  Pre-operative evaluation for EXPLORATION, WOUND. Lymphocele (Right)    Chief Complaint: lymphocele    PMH:  S/p heart tx 12/20 for restrictive cardiomyopathy  Groin lymphocele since.  Difficult intubation due to anterior larynx and obesity    Past Surgical History:   Procedure Laterality Date    BACK SURGERY      2007    BIOPSY WITH ULTRASOUND GUIDANCE N/A 12/31/2019    Procedure: BIOPSY, WITH US GUIDANCE;  Surgeon: Eric Antunez Jr., MD;  Location: [facility] CATH LAB;  Service: Cardiology;  Laterality: N/A;    BIOPSY WITH ULTRASOUND GUIDANCE N/A 1/7/2020    Procedure: BIOPSY, WITH US GUIDANCE;  Surgeon: Renetta Chaudhari MD;  Location: [facility] CATH LAB;  Service: Cardiology;  Laterality: N/A;    BIOPSY WITH ULTRASOUND GUIDANCE N/A 1/14/2020    Procedure: BIOPSY, WITH US GUIDANCE;  Surgeon: Renetta Chaudhari MD;  Location: [facility] CATH LAB;  Service: Cardiology;  Laterality: N/A;    BIOPSY WITH ULTRASOUND GUIDANCE N/A 1/28/2020    Procedure: BIOPSY, WITH US GUIDANCE;  Surgeon: Jolene Lua MD;  Location: [facility] CATH LAB;  Service: Cardiology;  Laterality: N/A;    BIOPSY WITH ULTRASOUND GUIDANCE N/A 2/11/2020    Procedure: BIOPSY, WITH US GUIDANCE;  Surgeon: Renetta Chaudhari MD;  Location: [facility] CATH LAB;  Service: Cardiology;  Laterality: N/A;    BIOPSY WITH ULTRASOUND GUIDANCE N/A 3/10/2020    Procedure: BIOPSY, WITH US GUIDANCE;  Surgeon: Jolene Lua MD;  Location: [facility] LAB;  Service: Cardiology;  Laterality: N/A;    BONE GRAFT Left     from Left hip to Left FA    eardrum reconstruction  1980    ELBOW SURGERY Left [id number]    FOREARM FRACTURE SURGERY Bilateral [id number]    multiple surgeries    HEART TRANSPLANT N/A 12/16/2019    Procedure: TRANSPLANT, HEART;  Surgeon: Talha Nuñez MD;  Location: [address];  Service:  Cardiothoracic;  Laterality: N/A;    INSERTION OF GRAFT TO PERICARDIUM  9/10/2019    Procedure: INSERTION, GRAFT, PERICARDIUM;  Surgeon: Shar Walden MD;  Location: [facility] OR [address] [place] [address];  Service: Cardiovascular;;    INSERTION OF IMPLANTABLE CARDIOVERTER-DEFIBRILLATOR (ICD) GENERATOR WITH TWO EXISTING LEADS      INSERTION OF PACEMAKER Left     LEFT VENTRICULAR ASSIST DEVICE N/A 9/10/2019    Procedure: INSERTION-LEFT VENTRICULAR ASSIST DEVICE;  Surgeon: Shar Walden MD;  [facility];  Service: Cardiovascular;  Laterality: N/A;  DT HM3     LUMBAR FUSION  2007    L4-L5    RIGHT HEART CATHETERIZATION Right 9/4/2019    Procedure: INSERTION, CATHETER, RIGHT HEART;  Surgeon: Vi Bryant MD;  Location: [facility] CATH LAB;  Service: Cardiology;  Laterality: Right;    RIGHT HEART CATHETERIZATION N/A 11/18/2019    Procedure: INSERTION, CATHETER, RIGHT HEART;  Surgeon: Eric Antunez Jr., MD;  Location: [facility] CATH LAB;  Service: Cardiology;  Laterality: N/A;    RIGHT HEART CATHETERIZATION Right 12/31/2019    Procedure: INSERTION, CATHETER, RIGHT HEART;  Surgeon: Eric Antunez Jr., MD;  Location: [facility] CATH LAB;  Service: Cardiology;  Laterality: Right;    RIGHT HEART CATHETERIZATION Right 1/7/2020    Procedure: INSERTION, CATHETER, RIGHT HEART;  Surgeon: Renetta Chaudhari MD;  Location: [facility] CATH LAB;  Service: Cardiology;  Laterality: Right;    SINUS SURGERY Right 1994    with lymph nodes    STERNAL WOUND CLOSURE  9/10/2019    Procedure: CLOSURE, WOUND, STERNUM;  Surgeon: Shar Walden MD;  Location: [facility] OR [address] [place] [address];  Service: Cardiovascular;;    TEMPOROMANDIBULAR JOINT SURGERY Right 1988    TONSILLECTOMY      TREATMENT OF CARDIAC ARRHYTHMIA N/A 9/24/2019    Procedure: CARDIOVERSION;  Surgeon: Moe Barrera MD;  Location: [facility] EP LAB;  Service: Cardiology;  Laterality: N/A;  AF, DCCV/NADINE, anes, DM, Rm 3073    TYMPANOSTOMY TUBE PLACEMENT  1971- 1979    multiple tube placements          Vital Signs Range (Last 24H):  Temp:  [37.1 °C (98.8 °F)]   Pulse:  [113]   Resp:  [20]   BP: (153)/(79)   SpO2:  [95 %]       CBC:     Recent Labs   Lab 20  0835   WBC 5.35   RBC 3.64*   HGB 10.1*   HCT 35.1*   *   MCV 96   MCH 27.7   MCHC 28.8*       CMP:   Recent Labs   Lab 20  0835      K 4.1      CO2 23   BUN 25*   CREATININE 1.6*      MG 1.9   CALCIUM 9.9   ALBUMIN 4.2   PROT 7.2   ALKPHOS 62   ALT 10   AST 20   BILITOT 0.6       INR:  No results for input(s): PT, INR, PROTIME, APTT in the last 720 hours.      Diagnostic Studies:      EKD Echo:    Conclusion s/p OHT    · Concentric left ventricular remodeling.  · Left ventricular systolic function. The estimated ejection fraction is 60%.  · Normal LV diastolic function.  · Normal right ventricular systolic function.  · Mild tricuspid regurgitation.  · Mild mitral regurgitation.          Anesthesia Evaluation    I have reviewed the Patient Summary Reports.    I have reviewed the Nursing Notes.   I have reviewed the Medications.     Review of Systems  Anesthesia Hx:  No problems with previous Anesthesia  Personal Hx of Anesthesia complications, Post-Operative Nausea/Vomiting   Social:  Non-Smoker, No Alcohol Use    Pulmonary:  Pulmonary Normal        Physical Exam  General:  Obesity    Airway/Jaw/Neck:  Airway Findings: Mouth Opening: Normal Tongue: Normal  Mallampati: III  Improves to III with phonation.  TM Distance: Normal, at least 6 cm  Jaw/Neck Findings:  Neck ROM: Normal ROM  Neck Findings:  Girth Increased      Dental:  Dental Findings: In tact   Chest/Lungs:  Chest/Lungs Findings: Normal Respiratory Rate     Heart/Vascular:  Heart Findings: Sounds: Normal        Mental Status:  Mental Status Findings:  Cooperative, Alert and Oriented         Anesthesia Plan  Type of Anesthesia, risks & benefits discussed:  Anesthesia Type:  general  Patient's Preference:   Intra-op Monitoring Plan: standard ASA  monitors  Intra-op Monitoring Plan Comments:   Post Op Pain Control Plan:   Post Op Pain Control Plan Comments:   Induction:   IV  Beta Blocker:  Patient is not currently on a Beta-Blocker (No further documentation required).       Informed Consent: Patient understands risks and agrees with Anesthesia plan.  Questions answered. Anesthesia consent signed with patient.  ASA Score: 3     Day of Surgery Review of History & Physical:    H&P update referred to the surgeon.     Anesthesia Plan Notes: videolaryngoscope  Consider steroid supplement        Ready For Surgery From Anesthesia Perspective.

## 2020-04-14 NOTE — PRE-PROCEDURE INSTRUCTIONS
PRE-OP INSTRUCTIONS:Instructed patient to have nothing by mouth past midnight.It is ok to take AM medications with a few sips of water.Patient instructed to shower the night before surgery as well as the morning of surgery with an antibacterial soap like dial or hibiclens from the neck down.Encouraged patient to wear loose fitting, comfortable clothing .Medication instructions for pm prior to and am of surgery reviewed.Instructed patient to avoid taking vitamins,supplements or ibuprofen the am of surgery.Patient instructed to take temperature the night before surgery as well as the morning of surgery and to notify [facility] at [phone] if it is 100.4 or above.Patient also informed of the current no visitor policy and advised patient to be dropped off and picked up by a family member or friend.Patient stated an understanding.    Patient denies any side effects or issues with anesthesia or sedation.    Patient does not know arrival time.Explained that this information comes from the surgeon's office and if they haven't heard from them by 2 or 3 pm on Wednesday 4/15/2020 to call the office.Patient stated an understanding.

## 2020-04-15 ENCOUNTER — TELEPHONE (OUTPATIENT)
Dept: VASCULAR SURGERY | Facility: CLINIC | Age: 50
End: 2020-04-15

## 2020-04-15 ENCOUNTER — OFFICE VISIT (OUTPATIENT)
Dept: TRANSPLANT | Facility: CLINIC | Age: 50
End: 2020-04-15
Payer: COMMERCIAL

## 2020-04-15 ENCOUNTER — TELEPHONE (OUTPATIENT)
Dept: TRANSPLANT | Facility: CLINIC | Age: 50
End: 2020-04-15

## 2020-04-15 ENCOUNTER — PATIENT MESSAGE (OUTPATIENT)
Dept: TRANSPLANT | Facility: CLINIC | Age: 50
End: 2020-04-15

## 2020-04-15 ENCOUNTER — LAB VISIT (OUTPATIENT)
Dept: LAB | Facility: HOSPITAL | Age: 50
End: 2020-04-15
Payer: COMMERCIAL

## 2020-04-15 VITALS
BODY MASS INDEX: 32.08 KG/M2 | HEIGHT: 67 IN | SYSTOLIC BLOOD PRESSURE: 126 MMHG | HEART RATE: 92 BPM | WEIGHT: 204.38 LBS | DIASTOLIC BLOOD PRESSURE: 75 MMHG

## 2020-04-15 DIAGNOSIS — Z94.1 HEART REPLACED BY TRANSPLANT: ICD-10-CM

## 2020-04-15 DIAGNOSIS — Z94.1 HEART TRANSPLANTED: ICD-10-CM

## 2020-04-15 DIAGNOSIS — D84.9 IMMUNOSUPPRESSION: ICD-10-CM

## 2020-04-15 DIAGNOSIS — I89.8 LYMPHOCELE AFTER SURGICAL PROCEDURE: ICD-10-CM

## 2020-04-15 DIAGNOSIS — R25.1 TREMOR: Primary | ICD-10-CM

## 2020-04-15 DIAGNOSIS — Z01.818 PRE-OP TESTING: Primary | ICD-10-CM

## 2020-04-15 DIAGNOSIS — T81.89XA LYMPHOCELE AFTER SURGICAL PROCEDURE: ICD-10-CM

## 2020-04-15 DIAGNOSIS — R53.81 PHYSICAL DECONDITIONING: ICD-10-CM

## 2020-04-15 PROCEDURE — 36415 COLL VENOUS BLD VENIPUNCTURE: CPT

## 2020-04-15 PROCEDURE — 3074F PR MOST RECENT SYSTOLIC BLOOD PRESSURE < 130 MM HG: ICD-10-PCS | Mod: CPTII,S$GLB,, | Performed by: INTERNAL MEDICINE

## 2020-04-15 PROCEDURE — 3008F BODY MASS INDEX DOCD: CPT | Mod: CPTII,S$GLB,, | Performed by: INTERNAL MEDICINE

## 2020-04-15 PROCEDURE — 3008F PR BODY MASS INDEX (BMI) DOCUMENTED: ICD-10-PCS | Mod: CPTII,S$GLB,, | Performed by: INTERNAL MEDICINE

## 2020-04-15 PROCEDURE — 3078F DIAST BP <80 MM HG: CPT | Mod: CPTII,S$GLB,, | Performed by: INTERNAL MEDICINE

## 2020-04-15 PROCEDURE — 99215 OFFICE O/P EST HI 40 MIN: CPT | Mod: S$GLB,,, | Performed by: INTERNAL MEDICINE

## 2020-04-15 PROCEDURE — 99215 PR OFFICE/OUTPT VISIT, EST, LEVL V, 40-54 MIN: ICD-10-PCS | Mod: S$GLB,,, | Performed by: INTERNAL MEDICINE

## 2020-04-15 PROCEDURE — 99999 PR PBB SHADOW E&M-EST. PATIENT-LVL III: ICD-10-PCS | Mod: PBBFAC,,, | Performed by: INTERNAL MEDICINE

## 2020-04-15 PROCEDURE — 3074F SYST BP LT 130 MM HG: CPT | Mod: CPTII,S$GLB,, | Performed by: INTERNAL MEDICINE

## 2020-04-15 PROCEDURE — 3078F PR MOST RECENT DIASTOLIC BLOOD PRESSURE < 80 MM HG: ICD-10-PCS | Mod: CPTII,S$GLB,, | Performed by: INTERNAL MEDICINE

## 2020-04-15 PROCEDURE — 99999 PR PBB SHADOW E&M-EST. PATIENT-LVL III: CPT | Mod: PBBFAC,,, | Performed by: INTERNAL MEDICINE

## 2020-04-15 RX ORDER — PREDNISONE 5 MG/1
5 TABLET ORAL DAILY
Qty: 30 TABLET | Refills: 12 | Status: SHIPPED | OUTPATIENT
Start: 2020-04-15 | End: 2021-05-24

## 2020-04-15 RX ORDER — PREDNISONE 5 MG/1
5 TABLET ORAL DAILY
Qty: 30 TABLET | Refills: 12 | Status: SHIPPED | OUTPATIENT
Start: 2020-04-15 | End: 2020-04-15 | Stop reason: SDUPTHER

## 2020-04-15 NOTE — ASSESSMENT & PLAN NOTE
Low risk for rejection will keep on tacro low dose cellcept and low dose pred til we can get full body PET to see if she has any evidence of extra cardiac sacroid

## 2020-04-15 NOTE — TELEPHONE ENCOUNTER
Met with pt and Dr. Jolene Lua, Pt is in agreement to hold off of vascular drainage of Lymphocele of Right Groin area. WBC 2.0, stopping Valcyte and will repeat cbc and cmv pcr next Monday at [facility].  Pt is aware of COVID 19 Testing within 48 hours of procedure, will cancel for today and will need to reschedule for next week.    Ms Navas is a pleasant woman, discussed going back to work part time from home after lymphocele is improved.  Also discussed getting allosure lab drawn from home with other tests in the future, [place] [facility] [place]

## 2020-04-15 NOTE — PROGRESS NOTES
"Subjective:   Ms. Navas is a 49 y.o. year old White female who received a donation after brain death heart transplant on 12/16/19.      Rejection status: Low      High Risk Donor: Yes ([facility] lab drawn 1/25/20 Neg results)  CMV status:   Donor: +   Recipient: negative  (CMV IGG nonreactive 2/17)    HPI s/p OHTx on 12/16/2019 (incidentially diagnosed with cardiac sacroid at time of explant) ,s/p BTT HM3 on 9/2019 HLD, obesity, and OA who comes in to prepare for lymphocele removal.  She was noted to have mild leukopenia (ANC 1250) She is otherwise dosing well with no signs of recurrence of her CMV five months post transplant.     Immuno - prednisone 5 daily / cellcept 500 BID (reduced to 500 due to N/V) / tacro 2/3 (goal 7 to 12)     Review of Systems   Constitution: Negative for decreased appetite, weight gain and weight loss.   Cardiovascular: Negative for chest pain, dyspnea on exertion, leg swelling, near-syncope, orthopnea and palpitations.   Respiratory: Negative for cough and shortness of breath.    Musculoskeletal: Negative for myalgias.   Gastrointestinal: Negative for jaundice.       Objective:     Physical Exam   Constitutional: She appears well-developed and well-nourished. No distress.   /75 (BP Location: Right arm, Patient Position: Sitting, BP Method: Medium (Automatic))   Pulse 92   Ht 5' 7" (1.702 m)   Wt 92.7 kg (204 lb 5.9 oz)   LMP  (LMP Unknown)   BMI 32.01 kg/m²      HENT:   Head: Normocephalic and atraumatic. Head is without abrasion and without contusion.   Right Ear: External ear normal.   Left Ear: External ear normal.   Nose: Nose normal. No epistaxis.   Mouth/Throat: Oropharynx is clear and moist. Mucous membranes are not cyanotic.   Eyes: Pupils are equal, round, and reactive to light. Conjunctivae and EOM are normal.   Neck: Normal range of motion. Neck supple. No tracheal deviation present.   Cardiovascular: Normal rate, regular rhythm, normal heart sounds and normal pulses. " Exam reveals no gallop.   No murmur heard.  Pulmonary/Chest: Effort normal and breath sounds normal. No stridor. No respiratory distress. She has no wheezes.   Abdominal: Soft. Normal appearance, normal aorta and bowel sounds are normal. She exhibits no distension. There is no tenderness.   Musculoskeletal: She exhibits no edema or tenderness.   Large right lymphocele   Neurological: She is alert. She has normal strength and normal reflexes. She exhibits normal muscle tone.   Skin: Skin is warm. No rash noted. No erythema.   Psychiatric: She has a normal mood and affect. Her speech is normal and behavior is normal. Judgment and thought content normal. Cognition and memory are normal.       Lab Results   Component Value Date    WBC 2.06 (L) 04/13/2020    HGB 8.9 (L) 04/13/2020    HCT 30.8 (L) 04/13/2020    MCV 95 04/13/2020     (H) 04/13/2020    CO2 25 04/13/2020    CREATININE 2.0 (H) 04/13/2020    CALCIUM 10.0 04/13/2020    ALBUMIN 4.1 04/13/2020    AST 17 04/13/2020     (H) 03/10/2020    ALT 12 04/13/2020       Lab Results   Component Value Date    INR 1.2 12/23/2019    INR 1.4 (H) 12/22/2019    INR 1.3 (H) 12/21/2019       BNP   Date Value Ref Range Status   03/10/2020 157 (H) 0 - 99 pg/mL Final     Comment:     Values of less than 100 pg/ml are consistent with non-CHF populations.   02/24/2020 278 (H) 0 - 99 pg/mL Final     Comment:     Values of less than 100 pg/ml are consistent with non-CHF populations.   02/17/2020 312 (H) 0 - 99 pg/mL Final     Comment:     Values of less than 100 pg/ml are consistent with non-CHF populations.       LD   Date Value Ref Range Status   12/16/2019 240 110 - 260 U/L Final     Comment:     Results are increased in hemolyzed samples.   12/15/2019 246 110 - 260 U/L Final     Comment:     Results are increased in hemolyzed samples.   12/14/2019 257 110 - 260 U/L Final     Comment:     Results are increased in hemolyzed samples.       Tacrolimus Lvl   Date Value Ref  Range Status   04/13/2020 7.0 5.0 - 15.0 ng/mL Final     Comment:     Testing performed by Liquid Chromatography-Tandem  Mass Spectrometry (LC-MS/MS).  This test was developed and its performance characteristics  determined by [facility], Department of Pathology  and Laboratory Medicine in a manner consistent with CLIA  requirements. It has not been cleared or approved by the [place]  Food and Drug Administration.  This test is used for clinical   purposes.  It should not be regarded as investigational or for  research.         Assessment:     1. Tremor    2. Physical deconditioning    3. Immunosuppression    4. Lymphocele after surgical procedure    5. Heart transplanted        Plan:     Problem List Items Addressed This Visit     Heart transplanted    Current Assessment & Plan     Would like to stop valcyte at five month post transplant and followup with CMV IGG and CMV PCR due to low WBC count          Relevant Medications    predniSONE (DELTASONE) 5 MG tablet    Immunosuppression    Current Assessment & Plan     Low risk for rejection will keep on tacro low dose cellcept and low dose pred til we can get full body PET to see if she has any evidence of extra cardiac sacroid          Physical deconditioning    Current Assessment & Plan     Seems to be improving   Would like to go back to work after her lymphocele is repaired (this will allow her to move out of her friends house)          Tremor - Primary    Current Assessment & Plan     Improving on lower goal dose of tacro         Lymphocele after surgical procedure    Current Assessment & Plan     With low WBC count would like to wait til it improves prior to surgery (messaged vascular surgery)               Return instructions as set forth by post transplant schedule or as needed:    Clinic: Return for labs and/or biopsy weekly the first month, every two weeks during month 2 and then monthly for the first year at the provider or coordinator's discretion.  During the second year, return to clinic every 3 months. Post transplant year 3-5 return every 6 months. There will be a comprehensive post transplant evaluation every year that may include LHC/RHC/biopsy, stress test, echo, CXR, and other health screening exams.    In addition to the clinical assessment, I have ordered Allomap testing for this patient to assist in identification of moderate/severe acute cellular rejection (ACR) in a pt with stable Allograft function instead of endomyocardial biopsy.     Patient is reminded to call with any health changes as these can be early signs of transplant complications. Patient is advised to make sure any new medications or changes of old medications are discussed with a pharmacist or physician knowledgeable with transplant to avoid rejection/drug toxicity related to significant drug interactions.    UNOS Patient Status  Functional Status: 70% - Cares for self: unable to carry on normal activity or active work  Physical Capacity: No Limitations  Working for Income: No  If no, reason not working: Demands of Treatment    Jolene Lua MD

## 2020-04-15 NOTE — LETTER
April 15, 2020        Joaquin Del Toro  [address]  [address]  [place] [place] [facility] [address]  Phone: [phone]  Fax: [phone]             [facility] [address]  [facility] [address]  Phone: [phone]   Patient: Deborah Navas   MR Number: [id number]   YOB: 1970   Date of Visit: 4/15/2020       Dear Dr. Joaquin Del Toro    Thank you for referring Deborah Navas to me for evaluation. Attached you will find relevant portions of my assessment and plan of care.    If you have questions, please do not hesitate to call me. I look forward to following Deborah Navas along with you.    Sincerely,    Jolene Lua MD    Enclosure    If you would like to receive this communication electronically, please contact [email or web address] or [phone] to request RIVS Link access.    RIVS Link is a tool which provides read-only access to select patient information with whom you have a relationship. Its easy to use and provides real time access to review your patients record including encounter summaries, notes, results, and demographic information.    If you feel you have received this communication in error or would no longer like to receive these types of communications, please e-mail [email or web address]

## 2020-04-15 NOTE — ASSESSMENT & PLAN NOTE
With low WBC count would like to wait til it improves prior to surgery (messaged vascular surgery)

## 2020-04-15 NOTE — ASSESSMENT & PLAN NOTE
Seems to be improving   Would like to go back to work after her lymphocele is repaired (this will allow her to move out of her friends house)

## 2020-04-15 NOTE — ASSESSMENT & PLAN NOTE
Would like to stop valcyte at five month post transplant and followup with CMV IGG and CMV PCR due to low WBC count

## 2020-04-15 NOTE — TELEPHONE ENCOUNTER
"Spoke with Flavia Joaquin (caregiver), informed her Ms Navas surgery on 4/16/2020 with Dr ZAINAB Bledsoe has been canceled until further notice due to COVID-19. Ms Nuñez states, " we are aware, I received your message, thank you for calling."  "

## 2020-04-16 ENCOUNTER — ANESTHESIA (OUTPATIENT)
Dept: SURGERY | Facility: HOSPITAL | Age: 50
End: 2020-04-16
Payer: COMMERCIAL

## 2020-04-16 ENCOUNTER — TELEPHONE (OUTPATIENT)
Dept: TRANSPLANT | Facility: CLINIC | Age: 50
End: 2020-04-16

## 2020-04-20 ENCOUNTER — PATIENT MESSAGE (OUTPATIENT)
Dept: TRANSPLANT | Facility: CLINIC | Age: 50
End: 2020-04-20

## 2020-04-20 LAB — HEART CARE KIT (SHORE): NORMAL

## 2020-04-21 ENCOUNTER — LAB VISIT (OUTPATIENT)
Dept: LAB | Facility: HOSPITAL | Age: 50
End: 2020-04-21
Attending: INTERNAL MEDICINE
Payer: COMMERCIAL

## 2020-04-21 DIAGNOSIS — Z79.899 ENCOUNTER FOR LONG-TERM (CURRENT) USE OF MEDICATIONS: ICD-10-CM

## 2020-04-21 DIAGNOSIS — Z94.1 HEART REPLACED BY TRANSPLANT: ICD-10-CM

## 2020-04-21 DIAGNOSIS — Z94.1 STATUS POST HEART TRANSPLANT: ICD-10-CM

## 2020-04-21 DIAGNOSIS — T86.298 OTHER COMPLICATION OF HEART TRANSPLANT: ICD-10-CM

## 2020-04-21 DIAGNOSIS — N28.9 RENAL INSUFFICIENCY: ICD-10-CM

## 2020-04-21 PROCEDURE — 80053 COMPREHEN METABOLIC PANEL: CPT

## 2020-04-21 PROCEDURE — 36415 COLL VENOUS BLD VENIPUNCTURE: CPT

## 2020-04-21 PROCEDURE — 85025 COMPLETE CBC W/AUTO DIFF WBC: CPT

## 2020-04-21 PROCEDURE — 86644 CMV ANTIBODY: CPT

## 2020-04-22 LAB
ALBUMIN SERPL BCP-MCNC: 4.2 G/DL (ref 3.5–5.2)
ALP SERPL-CCNC: 57 U/L (ref 55–135)
ALT SERPL W/O P-5'-P-CCNC: 9 U/L (ref 10–44)
ANION GAP SERPL CALC-SCNC: 16 MMOL/L (ref 8–16)
AST SERPL-CCNC: 22 U/L (ref 10–40)
BASOPHILS # BLD AUTO: 0.07 K/UL (ref 0–0.2)
BASOPHILS NFR BLD: 2.5 % (ref 0–1.9)
BILIRUB SERPL-MCNC: 0.7 MG/DL (ref 0.1–1)
BUN SERPL-MCNC: 26 MG/DL (ref 6–20)
CALCIUM SERPL-MCNC: 9.7 MG/DL (ref 8.7–10.5)
CHLORIDE SERPL-SCNC: 101 MMOL/L (ref 95–110)
CO2 SERPL-SCNC: 21 MMOL/L (ref 23–29)
CREAT SERPL-MCNC: 2 MG/DL (ref 0.5–1.4)
DIFFERENTIAL METHOD: ABNORMAL
EOSINOPHIL # BLD AUTO: 0.1 K/UL (ref 0–0.5)
EOSINOPHIL NFR BLD: 4.6 % (ref 0–8)
ERYTHROCYTE [DISTWIDTH] IN BLOOD BY AUTOMATED COUNT: 16.4 % (ref 11.5–14.5)
EST. GFR  (AFRICAN AMERICAN): 32.8 ML/MIN/1.73 M^2
EST. GFR  (NON AFRICAN AMERICAN): 28.5 ML/MIN/1.73 M^2
GLUCOSE SERPL-MCNC: 118 MG/DL (ref 70–110)
HCT VFR BLD AUTO: 33.2 % (ref 37–48.5)
HGB BLD-MCNC: 9.7 G/DL (ref 12–16)
IMM GRANULOCYTES # BLD AUTO: 0.02 K/UL (ref 0–0.04)
IMM GRANULOCYTES NFR BLD AUTO: 0.7 % (ref 0–0.5)
LYMPHOCYTES # BLD AUTO: 0.7 K/UL (ref 1–4.8)
LYMPHOCYTES NFR BLD: 24.1 % (ref 18–48)
MCH RBC QN AUTO: 28 PG (ref 27–31)
MCHC RBC AUTO-ENTMCNC: 29.2 G/DL (ref 32–36)
MCV RBC AUTO: 96 FL (ref 82–98)
MONOCYTES # BLD AUTO: 0.5 K/UL (ref 0.3–1)
MONOCYTES NFR BLD: 17 % (ref 4–15)
NEUTROPHILS # BLD AUTO: 1.4 K/UL (ref 1.8–7.7)
NEUTROPHILS NFR BLD: 51.1 % (ref 38–73)
NRBC BLD-RTO: 0 /100 WBC
PLATELET # BLD AUTO: 400 K/UL (ref 150–350)
PMV BLD AUTO: 10.1 FL (ref 9.2–12.9)
POTASSIUM SERPL-SCNC: 3.6 MMOL/L (ref 3.5–5.1)
PROT SERPL-MCNC: 7.1 G/DL (ref 6–8.4)
RBC # BLD AUTO: 3.46 M/UL (ref 4–5.4)
SODIUM SERPL-SCNC: 138 MMOL/L (ref 136–145)
WBC # BLD AUTO: 2.82 K/UL (ref 3.9–12.7)

## 2020-04-23 ENCOUNTER — PATIENT MESSAGE (OUTPATIENT)
Dept: TRANSPLANT | Facility: CLINIC | Age: 50
End: 2020-04-23

## 2020-04-24 ENCOUNTER — PATIENT MESSAGE (OUTPATIENT)
Dept: TRANSPLANT | Facility: CLINIC | Age: 50
End: 2020-04-24

## 2020-04-24 LAB — CMV IGG SERPL QL IA: NORMAL

## 2020-04-27 ENCOUNTER — LAB VISIT (OUTPATIENT)
Dept: LAB | Facility: HOSPITAL | Age: 50
End: 2020-04-27
Attending: INTERNAL MEDICINE
Payer: COMMERCIAL

## 2020-04-27 DIAGNOSIS — T86.298 OTHER COMPLICATION OF HEART TRANSPLANT: ICD-10-CM

## 2020-04-27 DIAGNOSIS — Z94.1 STATUS POST HEART TRANSPLANT: ICD-10-CM

## 2020-04-27 DIAGNOSIS — Z79.899 ENCOUNTER FOR LONG-TERM (CURRENT) USE OF MEDICATIONS: ICD-10-CM

## 2020-04-27 DIAGNOSIS — N18.4 CKD (CHRONIC KIDNEY DISEASE), STAGE IV: ICD-10-CM

## 2020-04-27 LAB
ALBUMIN SERPL BCP-MCNC: 3.9 G/DL (ref 3.5–5.2)
ANION GAP SERPL CALC-SCNC: 13 MMOL/L (ref 8–16)
BASOPHILS # BLD AUTO: 0.06 K/UL (ref 0–0.2)
BASOPHILS NFR BLD: 1.9 % (ref 0–1.9)
BUN SERPL-MCNC: 28 MG/DL (ref 6–20)
CALCIUM SERPL-MCNC: 9.8 MG/DL (ref 8.7–10.5)
CHLORIDE SERPL-SCNC: 103 MMOL/L (ref 95–110)
CMV DNA SERPL NAA+PROBE-ACNC: NORMAL IU/ML
CO2 SERPL-SCNC: 25 MMOL/L (ref 23–29)
CREAT SERPL-MCNC: 1.8 MG/DL (ref 0.5–1.4)
DIFFERENTIAL METHOD: ABNORMAL
EOSINOPHIL # BLD AUTO: 0.1 K/UL (ref 0–0.5)
EOSINOPHIL NFR BLD: 4 % (ref 0–8)
ERYTHROCYTE [DISTWIDTH] IN BLOOD BY AUTOMATED COUNT: 15.8 % (ref 11.5–14.5)
EST. GFR  (AFRICAN AMERICAN): 37.3 ML/MIN/1.73 M^2
EST. GFR  (NON AFRICAN AMERICAN): 32.4 ML/MIN/1.73 M^2
GLUCOSE SERPL-MCNC: 109 MG/DL (ref 70–110)
HCT VFR BLD AUTO: 32.2 % (ref 37–48.5)
HGB BLD-MCNC: 9.2 G/DL (ref 12–16)
IMM GRANULOCYTES # BLD AUTO: 0.05 K/UL (ref 0–0.04)
IMM GRANULOCYTES NFR BLD AUTO: 1.5 % (ref 0–0.5)
LYMPHOCYTES # BLD AUTO: 0.5 K/UL (ref 1–4.8)
LYMPHOCYTES NFR BLD: 15.2 % (ref 18–48)
MCH RBC QN AUTO: 28.4 PG (ref 27–31)
MCHC RBC AUTO-ENTMCNC: 28.6 G/DL (ref 32–36)
MCV RBC AUTO: 99 FL (ref 82–98)
MONOCYTES # BLD AUTO: 0.7 K/UL (ref 0.3–1)
MONOCYTES NFR BLD: 22.9 % (ref 4–15)
NEUTROPHILS # BLD AUTO: 1.8 K/UL (ref 1.8–7.7)
NEUTROPHILS NFR BLD: 54.5 % (ref 38–73)
NRBC BLD-RTO: 0 /100 WBC
PHOSPHATE SERPL-MCNC: 4 MG/DL (ref 2.7–4.5)
PLATELET # BLD AUTO: 360 K/UL (ref 150–350)
PMV BLD AUTO: 9.9 FL (ref 9.2–12.9)
POTASSIUM SERPL-SCNC: 3.7 MMOL/L (ref 3.5–5.1)
RBC # BLD AUTO: 3.24 M/UL (ref 4–5.4)
SODIUM SERPL-SCNC: 141 MMOL/L (ref 136–145)
WBC # BLD AUTO: 3.23 K/UL (ref 3.9–12.7)

## 2020-04-27 PROCEDURE — 85025 COMPLETE CBC W/AUTO DIFF WBC: CPT

## 2020-04-27 PROCEDURE — 36415 COLL VENOUS BLD VENIPUNCTURE: CPT

## 2020-04-27 PROCEDURE — 80069 RENAL FUNCTION PANEL: CPT

## 2020-04-29 ENCOUNTER — PATIENT MESSAGE (OUTPATIENT)
Dept: TRANSPLANT | Facility: CLINIC | Age: 50
End: 2020-04-29

## 2020-05-11 DIAGNOSIS — Z94.1 STATUS POST HEART TRANSPLANT: Primary | ICD-10-CM

## 2020-05-11 RX ORDER — FERROUS SULFATE 325(65) MG
TABLET, DELAYED RELEASE (ENTERIC COATED) ORAL
Qty: 60 TABLET | Refills: 0 | Status: SHIPPED | OUTPATIENT
Start: 2020-05-11 | End: 2020-06-08

## 2020-05-18 ENCOUNTER — LAB VISIT (OUTPATIENT)
Dept: LAB | Facility: HOSPITAL | Age: 50
End: 2020-05-18
Attending: INTERNAL MEDICINE
Payer: COMMERCIAL

## 2020-05-18 ENCOUNTER — HOSPITAL ENCOUNTER (OUTPATIENT)
Dept: CARDIOLOGY | Facility: HOSPITAL | Age: 50
Discharge: HOME | End: 2020-05-18
Attending: INTERNAL MEDICINE
Payer: COMMERCIAL

## 2020-05-18 VITALS
BODY MASS INDEX: 32.02 KG/M2 | HEIGHT: 67 IN | SYSTOLIC BLOOD PRESSURE: 126 MMHG | DIASTOLIC BLOOD PRESSURE: 75 MMHG | WEIGHT: 204 LBS

## 2020-05-18 DIAGNOSIS — Z79.899 ENCOUNTER FOR LONG-TERM (CURRENT) USE OF MEDICATIONS: ICD-10-CM

## 2020-05-18 DIAGNOSIS — Z94.1 STATUS POST HEART TRANSPLANT: ICD-10-CM

## 2020-05-18 DIAGNOSIS — T86.298 OTHER COMPLICATION OF HEART TRANSPLANT: ICD-10-CM

## 2020-05-18 DIAGNOSIS — E78.2 MIXED HYPERLIPIDEMIA: ICD-10-CM

## 2020-05-18 DIAGNOSIS — N28.9 RENAL INSUFFICIENCY: ICD-10-CM

## 2020-05-18 LAB
ALBUMIN SERPL BCP-MCNC: 4.2 G/DL (ref 3.5–5.2)
ALP SERPL-CCNC: 62 U/L (ref 55–135)
ALT SERPL W/O P-5'-P-CCNC: 10 U/L (ref 10–44)
ANION GAP SERPL CALC-SCNC: 15 MMOL/L (ref 8–16)
AST SERPL-CCNC: 20 U/L (ref 10–40)
AV INDEX (PROSTH): 0.64
AV MEAN GRADIENT: 8 MMHG
AV PEAK GRADIENT: 17 MMHG
AV VALVE AREA: 1.93 CM2
AV VELOCITY RATIO: 0.55
BASOPHILS # BLD AUTO: 0.05 K/UL (ref 0–0.2)
BASOPHILS NFR BLD: 0.9 % (ref 0–1.9)
BILIRUB SERPL-MCNC: 0.6 MG/DL (ref 0.1–1)
BNP SERPL-MCNC: 48 PG/ML (ref 0–99)
BSA FOR ECHO PROCEDURE: 2.09 M2
BUN SERPL-MCNC: 25 MG/DL (ref 6–20)
CALCIUM SERPL-MCNC: 9.9 MG/DL (ref 8.7–10.5)
CHLORIDE SERPL-SCNC: 102 MMOL/L (ref 95–110)
CHOLEST SERPL-MCNC: 162 MG/DL (ref 120–199)
CHOLEST/HDLC SERPL: 3.1 {RATIO} (ref 2–5)
CO2 SERPL-SCNC: 23 MMOL/L (ref 23–29)
CREAT SERPL-MCNC: 1.6 MG/DL (ref 0.5–1.4)
CV ECHO LV RWT: 0.56 CM
DIFFERENTIAL METHOD: ABNORMAL
DOP CALC AO PEAK VEL: 2.09 M/S
DOP CALC AO VTI: 35.91 CM
DOP CALC LVOT AREA: 3 CM2
DOP CALC LVOT DIAMETER: 1.96 CM
DOP CALC LVOT PEAK VEL: 1.14 M/S
DOP CALC LVOT STROKE VOLUME: 69.21 CM3
DOP CALCLVOT PEAK VEL VTI: 22.95 CM
E WAVE DECELERATION TIME: 214.79 MSEC
E/A RATIO: 1.92
E/E' RATIO: 6.52 M/S
ECHO LV POSTERIOR WALL: 1.11 CM (ref 0.6–1.1)
EOSINOPHIL # BLD AUTO: 0.2 K/UL (ref 0–0.5)
EOSINOPHIL NFR BLD: 3.9 % (ref 0–8)
ERYTHROCYTE [DISTWIDTH] IN BLOOD BY AUTOMATED COUNT: 15 % (ref 11.5–14.5)
EST. GFR  (AFRICAN AMERICAN): 43 ML/MIN/1.73 M^2
EST. GFR  (NON AFRICAN AMERICAN): 37.3 ML/MIN/1.73 M^2
FRACTIONAL SHORTENING: 43 % (ref 28–44)
GLUCOSE SERPL-MCNC: 103 MG/DL (ref 70–110)
HCT VFR BLD AUTO: 35.1 % (ref 37–48.5)
HDLC SERPL-MCNC: 52 MG/DL (ref 40–75)
HDLC SERPL: 32.1 % (ref 20–50)
HGB BLD-MCNC: 10.1 G/DL (ref 12–16)
IMM GRANULOCYTES # BLD AUTO: 0.04 K/UL (ref 0–0.04)
IMM GRANULOCYTES NFR BLD AUTO: 0.7 % (ref 0–0.5)
INTERVENTRICULAR SEPTUM: 1.06 CM (ref 0.6–1.1)
IVRT: 91.34 MSEC
LDLC SERPL CALC-MCNC: 87.2 MG/DL (ref 63–159)
LEFT INTERNAL DIMENSION IN SYSTOLE: 2.27 CM (ref 2.1–4)
LEFT VENTRICLE DIASTOLIC VOLUME INDEX: 34.33 ML/M2
LEFT VENTRICLE DIASTOLIC VOLUME: 70 ML
LEFT VENTRICLE MASS INDEX: 70 G/M2
LEFT VENTRICLE SYSTOLIC VOLUME INDEX: 8.6 ML/M2
LEFT VENTRICLE SYSTOLIC VOLUME: 17.56 ML
LEFT VENTRICULAR INTERNAL DIMENSION IN DIASTOLE: 4 CM (ref 3.5–6)
LEFT VENTRICULAR MASS: 142.78 G
LV LATERAL E/E' RATIO: 5.77 M/S
LV SEPTAL E/E' RATIO: 7.5 M/S
LYMPHOCYTES # BLD AUTO: 0.6 K/UL (ref 1–4.8)
LYMPHOCYTES NFR BLD: 10.5 % (ref 18–48)
MAGNESIUM SERPL-MCNC: 1.9 MG/DL (ref 1.6–2.6)
MCH RBC QN AUTO: 27.7 PG (ref 27–31)
MCHC RBC AUTO-ENTMCNC: 28.8 G/DL (ref 32–36)
MCV RBC AUTO: 96 FL (ref 82–98)
MONOCYTES # BLD AUTO: 0.7 K/UL (ref 0.3–1)
MONOCYTES NFR BLD: 13.1 % (ref 4–15)
MV PEAK A VEL: 0.39 M/S
MV PEAK E VEL: 0.75 M/S
NEUTROPHILS # BLD AUTO: 3.8 K/UL (ref 1.8–7.7)
NEUTROPHILS NFR BLD: 70.9 % (ref 38–73)
NONHDLC SERPL-MCNC: 110 MG/DL
NRBC BLD-RTO: 0 /100 WBC
PISA TR MAX VEL: 2.73 M/S
PLATELET # BLD AUTO: 374 K/UL (ref 150–350)
PMV BLD AUTO: 9.7 FL (ref 9.2–12.9)
POTASSIUM SERPL-SCNC: 4.1 MMOL/L (ref 3.5–5.1)
PROT SERPL-MCNC: 7.2 G/DL (ref 6–8.4)
PV PEAK VELOCITY: 1.09 CM/S
RBC # BLD AUTO: 3.64 M/UL (ref 4–5.4)
SINUS: 2.38 CM
SODIUM SERPL-SCNC: 140 MMOL/L (ref 136–145)
STJ: 2.01 CM
TDI LATERAL: 0.13 M/S
TDI SEPTAL: 0.1 M/S
TDI: 0.12 M/S
TR MAX PG: 30 MMHG
TRIGL SERPL-MCNC: 114 MG/DL (ref 30–150)
WBC # BLD AUTO: 5.35 K/UL (ref 3.9–12.7)

## 2020-05-18 PROCEDURE — 83735 ASSAY OF MAGNESIUM: CPT

## 2020-05-18 PROCEDURE — 36415 COLL VENOUS BLD VENIPUNCTURE: CPT

## 2020-05-18 PROCEDURE — 83880 ASSAY OF NATRIURETIC PEPTIDE: CPT

## 2020-05-18 PROCEDURE — 93306 TTE W/DOPPLER COMPLETE: CPT

## 2020-05-18 PROCEDURE — 80197 ASSAY OF TACROLIMUS: CPT

## 2020-05-18 PROCEDURE — 85025 COMPLETE CBC W/AUTO DIFF WBC: CPT

## 2020-05-18 PROCEDURE — 80053 COMPREHEN METABOLIC PANEL: CPT

## 2020-05-18 PROCEDURE — 80061 LIPID PANEL: CPT

## 2020-05-18 PROCEDURE — 93306 ECHO (CUPID ONLY): ICD-10-PCS | Mod: 26,,, | Performed by: INTERNAL MEDICINE

## 2020-05-18 PROCEDURE — 93306 TTE W/DOPPLER COMPLETE: CPT | Mod: 26,,, | Performed by: INTERNAL MEDICINE

## 2020-05-19 LAB — TACROLIMUS BLD-MCNC: 6.9 NG/ML (ref 5–15)

## 2020-05-21 ENCOUNTER — PATIENT MESSAGE (OUTPATIENT)
Dept: VASCULAR SURGERY | Facility: CLINIC | Age: 50
End: 2020-05-21

## 2020-05-21 ENCOUNTER — LAB VISIT (OUTPATIENT)
Dept: LAB | Facility: HOSPITAL | Age: 50
End: 2020-05-21
Payer: COMMERCIAL

## 2020-05-21 ENCOUNTER — OFFICE VISIT (OUTPATIENT)
Dept: TRANSPLANT | Facility: CLINIC | Age: 50
End: 2020-05-21
Payer: COMMERCIAL

## 2020-05-21 VITALS
WEIGHT: 201.5 LBS | HEART RATE: 107 BPM | BODY MASS INDEX: 31.63 KG/M2 | SYSTOLIC BLOOD PRESSURE: 134 MMHG | HEIGHT: 67 IN | DIASTOLIC BLOOD PRESSURE: 82 MMHG

## 2020-05-21 DIAGNOSIS — N18.2 STAGE 2 CHRONIC KIDNEY DISEASE: ICD-10-CM

## 2020-05-21 DIAGNOSIS — D86.85 CARDIAC SARCOIDOSIS: ICD-10-CM

## 2020-05-21 DIAGNOSIS — Z94.1 HEART REPLACED BY TRANSPLANT: ICD-10-CM

## 2020-05-21 DIAGNOSIS — T81.89XA LYMPHOCELE AFTER SURGICAL PROCEDURE: ICD-10-CM

## 2020-05-21 DIAGNOSIS — D84.9 IMMUNOSUPPRESSION: ICD-10-CM

## 2020-05-21 DIAGNOSIS — Z94.1 HEART TRANSPLANTED: ICD-10-CM

## 2020-05-21 DIAGNOSIS — R25.1 TREMOR: Primary | ICD-10-CM

## 2020-05-21 DIAGNOSIS — I89.8 LYMPHOCELE AFTER SURGICAL PROCEDURE: ICD-10-CM

## 2020-05-21 PROCEDURE — 36415 COLL VENOUS BLD VENIPUNCTURE: CPT

## 2020-05-21 PROCEDURE — 99215 OFFICE O/P EST HI 40 MIN: CPT | Mod: S$GLB,,, | Performed by: INTERNAL MEDICINE

## 2020-05-21 PROCEDURE — 3075F PR MOST RECENT SYSTOLIC BLOOD PRESS GE 130-139MM HG: ICD-10-PCS | Mod: CPTII,S$GLB,, | Performed by: INTERNAL MEDICINE

## 2020-05-21 PROCEDURE — 3079F DIAST BP 80-89 MM HG: CPT | Mod: CPTII,S$GLB,, | Performed by: INTERNAL MEDICINE

## 2020-05-21 PROCEDURE — 99215 PR OFFICE/OUTPT VISIT, EST, LEVL V, 40-54 MIN: ICD-10-PCS | Mod: S$GLB,,, | Performed by: INTERNAL MEDICINE

## 2020-05-21 PROCEDURE — 3008F PR BODY MASS INDEX (BMI) DOCUMENTED: ICD-10-PCS | Mod: CPTII,S$GLB,, | Performed by: INTERNAL MEDICINE

## 2020-05-21 PROCEDURE — 3079F PR MOST RECENT DIASTOLIC BLOOD PRESSURE 80-89 MM HG: ICD-10-PCS | Mod: CPTII,S$GLB,, | Performed by: INTERNAL MEDICINE

## 2020-05-21 PROCEDURE — 3008F BODY MASS INDEX DOCD: CPT | Mod: CPTII,S$GLB,, | Performed by: INTERNAL MEDICINE

## 2020-05-21 PROCEDURE — 99999 PR PBB SHADOW E&M-EST. PATIENT-LVL IV: ICD-10-PCS | Mod: PBBFAC,,, | Performed by: INTERNAL MEDICINE

## 2020-05-21 PROCEDURE — 99999 PR PBB SHADOW E&M-EST. PATIENT-LVL IV: CPT | Mod: PBBFAC,,, | Performed by: INTERNAL MEDICINE

## 2020-05-21 PROCEDURE — 3075F SYST BP GE 130 - 139MM HG: CPT | Mod: CPTII,S$GLB,, | Performed by: INTERNAL MEDICINE

## 2020-05-21 NOTE — LETTER
May 21, 2020        Joaquin Del Toro  [address]  [address]  [facility] [address]  Phone: [phone]  Fax: [phone]             [facility] [address]  [facility] [address]  Phone: [phone]   Patient: Deborah Navas   MR Number: [id number]   YOB: 1970   Date of Visit: 5/21/2020       Dear Dr. Joaquin Del Toro    Thank you for referring Deborah Navas to me for evaluation. Attached you will find relevant portions of my assessment and plan of care.    If you have questions, please do not hesitate to call me. I look forward to following Deborah Navas along with you.    Sincerely,    Johny Zarate MD    Enclosure    If you would like to receive this communication electronically, please contact [email or web address] or [phone] to request [employer] Link access.    [employer] Link is a tool which provides read-only access to select patient information with whom you have a relationship. Its easy to use and provides real time access to review your patients record including encounter summaries, notes, results, and demographic information.    If you feel you have received this communication in error or would no longer like to receive these types of communications, please e-mail [email or web address]

## 2020-05-21 NOTE — PROGRESS NOTES
Subjective:   Ms. Navas is a 49 y.o. year old White female who received a donation after brain death heart transplant on 12/16/19.      Rejection status: Low      High Risk Donor: Yes ([facility] lab drawn 1/25/20 Neg results)  CMV status:   Donor: +   Recipient: negative  (CMV IGG nonreactive 2/17)    HPI s/p OHTx on 12/16/2019 (incidentially diagnosed with cardiac sacroid at time of explant) ,s/p BTT HM3 on 9/2019 HLD, obesity, and OA who comes in to prepare for lymphocele removal.  She was noted to have mild leukopenia (ANC 1250) She is otherwise dosing well with no signs of recurrence of her CMV five months post transplant.     Immuno - prednisone 5 daily / cellcept 500 BID  / tacro 2/3 (goal 7 to 12)     Only issue is tremor. Leukopenia improved.      May 18 2020  · Concentric left ventricular remodeling.  · Left ventricular systolic function. The estimated ejection fraction is 60%.  · Normal LV diastolic function.  · Normal right ventricular systolic function.  · Mild tricuspid regurgitation.  · Mild mitral regurgitation.  Review of Systems   Constitution: Negative for decreased appetite, weight gain and weight loss.   Cardiovascular: Negative for chest pain, dyspnea on exertion, leg swelling, near-syncope, orthopnea and palpitations.   Respiratory: Negative for cough and shortness of breath.    Musculoskeletal: Negative for myalgias.   Gastrointestinal: Negative for jaundice.       Objective:     Physical Exam   Constitutional: She appears well-developed and well-nourished. No distress.        HENT:   Head: Normocephalic and atraumatic. Head is without abrasion and without contusion.   Right Ear: External ear normal.   Left Ear: External ear normal.   Nose: Nose normal. No epistaxis.   Mouth/Throat: Oropharynx is clear and moist. Mucous membranes are not cyanotic.   Eyes: Pupils are equal, round, and reactive to light. Conjunctivae and EOM are normal.   Neck: Normal range of motion. Neck supple. No tracheal  deviation present.   Cardiovascular: Normal rate, regular rhythm, normal heart sounds and normal pulses. Exam reveals no gallop.   No murmur heard.  Pulmonary/Chest: Effort normal and breath sounds normal. No stridor. No respiratory distress. She has no wheezes.   Abdominal: Soft. Normal appearance, normal aorta and bowel sounds are normal. She exhibits no distension. There is no tenderness.   Musculoskeletal: She exhibits no edema or tenderness.   Large right lymphocele   Neurological: She is alert. She has normal strength and normal reflexes. She exhibits normal muscle tone.   Skin: Skin is warm. No rash noted. No erythema.   Psychiatric: She has a normal mood and affect. Her speech is normal and behavior is normal. Judgment and thought content normal. Cognition and memory are normal.       Lab Results   Component Value Date    WBC 5.35 05/18/2020    HGB 10.1 (L) 05/18/2020    HCT 35.1 (L) 05/18/2020    MCV 96 05/18/2020     (H) 05/18/2020    CO2 23 05/18/2020    CREATININE 1.6 (H) 05/18/2020    CALCIUM 9.9 05/18/2020    ALBUMIN 4.2 05/18/2020    AST 20 05/18/2020    BNP 48 05/18/2020    ALT 10 05/18/2020       Lab Results   Component Value Date    INR 1.2 12/23/2019    INR 1.4 (H) 12/22/2019    INR 1.3 (H) 12/21/2019       BNP   Date Value Ref Range Status   05/18/2020 48 0 - 99 pg/mL Final     Comment:     Values of less than 100 pg/ml are consistent with non-CHF populations.   03/10/2020 157 (H) 0 - 99 pg/mL Final     Comment:     Values of less than 100 pg/ml are consistent with non-CHF populations.   02/24/2020 278 (H) 0 - 99 pg/mL Final     Comment:     Values of less than 100 pg/ml are consistent with non-CHF populations.       LD   Date Value Ref Range Status   12/16/2019 240 110 - 260 U/L Final     Comment:     Results are increased in hemolyzed samples.   12/15/2019 246 110 - 260 U/L Final     Comment:     Results are increased in hemolyzed samples.   12/14/2019 257 110 - 260 U/L Final      Comment:     Results are increased in hemolyzed samples.       Tacrolimus Lvl   Date Value Ref Range Status   05/18/2020 6.9 5.0 - 15.0 ng/mL Final     Comment:     Testing performed by Liquid Chromatography-Tandem  Mass Spectrometry (LC-MS/MS).  This test was developed and its performance characteristics  determined by [facility], Department of Pathology  and Laboratory Medicine in a manner consistent with CLIA  requirements. It has not been cleared or approved by the [place]  Food and Drug Administration.  This test is used for clinical   purposes.  It should not be regarded as investigational or for  research.         Assessment:     1. Tremor    2. 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 Lymphocele after surgical procedure    4. Immunosuppression    5. Stage 2 chronic kidney disease    6. 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 During the second year, return to clinic every 3 months. Post transplant year 3-5 return every 6 months. There will be a comprehensive post transplant evaluation every year that may include LHC/RHC/biopsy, stress test, echo, CXR, and other health screening exams.    In addition to the clinical assessment, I have ordered Allomap testing for this patient to assist in identification of moderate/severe acute cellular rejection (ACR) in a pt with stable Allograft function  instead of endomyocardial biopsy.     Patient is reminded to call with any health changes as these can be early signs of transplant complications. Patient is advised to make sure any new medications or changes of old medications are discussed with a pharmacist or physician knowledgeable with transplant to avoid rejection/drug toxicity related to significant drug interactions.    UNOS Patient Status  Functional Status: 70% - Cares for self: unable to carry on normal activity or active work  Physical Capacity: No Limitations  Working for Income: No  If no, reason not working: Demands of Treatment    Johny Zarate MD

## 2020-05-22 ENCOUNTER — TELEPHONE (OUTPATIENT)
Dept: VASCULAR SURGERY | Facility: CLINIC | Age: 50
End: 2020-05-22

## 2020-05-22 DIAGNOSIS — Z01.818 PRE-OP EVALUATION: Primary | ICD-10-CM

## 2020-05-22 NOTE — TELEPHONE ENCOUNTER
Spoke with Ms Navas, Date: 5/25/2020, Location: [facility] Agustin Saldivar, Andrea Stevens and phone number: [phone] or [phone] given to get COVID-19 prescreen test done prior to surgery 5/27/2020. Ms Navas verbalized understanding.

## 2020-05-25 ENCOUNTER — PATIENT MESSAGE (OUTPATIENT)
Dept: TRANSPLANT | Facility: CLINIC | Age: 50
End: 2020-05-25

## 2020-05-26 ENCOUNTER — LAB VISIT (OUTPATIENT)
Dept: INTERNAL MEDICINE | Facility: CLINIC | Age: 50
End: 2020-05-26
Payer: COMMERCIAL

## 2020-05-26 ENCOUNTER — TELEPHONE (OUTPATIENT)
Dept: VASCULAR SURGERY | Facility: CLINIC | Age: 50
End: 2020-05-26

## 2020-05-26 DIAGNOSIS — Z94.1 HEART REPLACED BY TRANSPLANT: Primary | ICD-10-CM

## 2020-05-26 DIAGNOSIS — Z01.818 PRE-OP EVALUATION: ICD-10-CM

## 2020-05-26 LAB — HEART CARE KIT (SHORE): NORMAL

## 2020-05-26 PROCEDURE — U0003 INFECTIOUS AGENT DETECTION BY NUCLEIC ACID (DNA OR RNA); SEVERE ACUTE RESPIRATORY SYNDROME CORONAVIRUS 2 (SARS-COV-2) (CORONAVIRUS DISEASE [COVID-19]), AMPLIFIED PROBE TECHNIQUE, MAKING USE OF HIGH THROUGHPUT TECHNOLOGIES AS DESCRIBED BY CMS-2020-01-R: HCPCS

## 2020-05-26 NOTE — PRE-PROCEDURE INSTRUCTIONS
Preop instructions:     NPO instructions: NO solids foods,non-clear liquids including milk, milk products, creamers, gum, mints, or hard candy after midnight the night prior to your surgery or 8 hours prior to your SX start time. 2400  We encourage a limited consumption of CLEAR LIQUIDS after midnight the night before your surgery up to 2 hrs prior to your SX start time. 0930    You may have sips of water with your AM medications one (1) hour prior to your arrival to the hospital. 0830    If you have received specific instructions from your Surgeon/Surgeon's staff, please follow their instructions.     Showering instructions, directions, leave all valuables at home, medication instructions for PM prior & am of procedure explained. Patient stated an understanding.      Patient REPORTS H/O PONV  HEART TRANSPLANT - 12/16/2019    ARRIVAL TO [place] [address]  We are requesting all patients and visitors log their temperature the night before surgery and the morning of surgery prior to arrival to the hospital. If temperature is 100.4 or greater - please call - [phone]. Temperatures will also be taken at the hospital the morning of your Surgery/Procedure.  Adult Patients needing assistance having Procedures, may be accompanied by one Adult support person. Minors will be allowed to have both Parents/Legal Guardians accompany them to and from the procedural area.  One spouse/partner/support person/Two Parents may remain with the pt prior to surgery and must then wait in a socially distant manner until a member of the surgery team provides an update at the conclusion of the surgery. If the patient is being admitted to the hospital after surgery, ONE immediate Family member/Spouse/Legal Guardian/POA may stay with the patient. Visitors must always remain in the patient's room and not gather in common areas such as waiting rooms.   Otherwise the visitor and the patient can travel home together.   Children admitted to the hospital  may have 2 Parents/Guardian including PICU  We certainly will inform you of any changes as they come and please don't hesitate to contact us with any questions or concerns!     PT WILL BE ADMITTED POST OP.

## 2020-05-27 ENCOUNTER — LAB VISIT (OUTPATIENT)
Dept: LAB | Facility: HOSPITAL | Age: 50
End: 2020-05-27
Payer: COMMERCIAL

## 2020-05-27 ENCOUNTER — HOSPITAL ENCOUNTER (OUTPATIENT)
Facility: HOSPITAL | Age: 50
Discharge: HOME | End: 2020-05-28
Attending: SURGERY | Admitting: SURGERY
Payer: COMMERCIAL

## 2020-05-27 DIAGNOSIS — Z94.1 HEART REPLACED BY TRANSPLANT: ICD-10-CM

## 2020-05-27 DIAGNOSIS — I89.8 LYMPHOCELE: Primary | ICD-10-CM

## 2020-05-27 LAB — SARS-COV-2 RNA RESP QL NAA+PROBE: NOT DETECTED

## 2020-05-27 PROCEDURE — 38999: ICD-10-PCS | Mod: ,,, | Performed by: SURGERY

## 2020-05-27 PROCEDURE — 94761 N-INVAS EAR/PLS OXIMETRY MLT: CPT

## 2020-05-27 PROCEDURE — 63600175 PHARM REV CODE 636 W HCPCS: Performed by: STUDENT IN AN ORGANIZED HEALTH CARE EDUCATION/TRAINING PROGRAM

## 2020-05-27 PROCEDURE — 36000707: Performed by: SURGERY

## 2020-05-27 PROCEDURE — 63600175 PHARM REV CODE 636 W HCPCS: Performed by: NURSE ANESTHETIST, CERTIFIED REGISTERED

## 2020-05-27 PROCEDURE — 71000033 HC RECOVERY, INTIAL HOUR: Performed by: SURGERY

## 2020-05-27 PROCEDURE — 38999 UNLISTD PX HEMIC/LYMPHTC SYS: CPT | Mod: ,,, | Performed by: SURGERY

## 2020-05-27 PROCEDURE — 71000015 HC POSTOP RECOV 1ST HR: Performed by: SURGERY

## 2020-05-27 PROCEDURE — 88305 TISSUE EXAM BY PATHOLOGIST: ICD-10-PCS | Mod: 26,,, | Performed by: PATHOLOGY

## 2020-05-27 PROCEDURE — D9220A PRA ANESTHESIA: Mod: ANES,,, | Performed by: ANESTHESIOLOGY

## 2020-05-27 PROCEDURE — 25000003 PHARM REV CODE 250: Performed by: STUDENT IN AN ORGANIZED HEALTH CARE EDUCATION/TRAINING PROGRAM

## 2020-05-27 PROCEDURE — 88305 TISSUE EXAM BY PATHOLOGIST: CPT | Mod: 26,,, | Performed by: PATHOLOGY

## 2020-05-27 PROCEDURE — 36415 COLL VENOUS BLD VENIPUNCTURE: CPT

## 2020-05-27 PROCEDURE — 63600175 PHARM REV CODE 636 W HCPCS: Performed by: ANESTHESIOLOGY

## 2020-05-27 PROCEDURE — 88305 TISSUE EXAM BY PATHOLOGIST: CPT | Performed by: PATHOLOGY

## 2020-05-27 PROCEDURE — 63600175 PHARM REV CODE 636 W HCPCS

## 2020-05-27 PROCEDURE — D9220A PRA ANESTHESIA: ICD-10-PCS | Mod: ANES,,, | Performed by: ANESTHESIOLOGY

## 2020-05-27 PROCEDURE — 37000009 HC ANESTHESIA EA ADD 15 MINS: Performed by: SURGERY

## 2020-05-27 PROCEDURE — 71000016 HC POSTOP RECOV ADDL HR: Performed by: SURGERY

## 2020-05-27 PROCEDURE — 37000008 HC ANESTHESIA 1ST 15 MINUTES: Performed by: SURGERY

## 2020-05-27 PROCEDURE — D9220A PRA ANESTHESIA: ICD-10-PCS | Mod: CRNA,,, | Performed by: NURSE ANESTHETIST, CERTIFIED REGISTERED

## 2020-05-27 PROCEDURE — 25000003 PHARM REV CODE 250: Performed by: NURSE ANESTHETIST, CERTIFIED REGISTERED

## 2020-05-27 PROCEDURE — D9220A PRA ANESTHESIA: Mod: CRNA,,, | Performed by: NURSE ANESTHETIST, CERTIFIED REGISTERED

## 2020-05-27 PROCEDURE — 63600175 PHARM REV CODE 636 W HCPCS: Mod: JG | Performed by: SURGERY

## 2020-05-27 PROCEDURE — 36000706: Performed by: SURGERY

## 2020-05-27 RX ORDER — ONDANSETRON 2 MG/ML
INJECTION INTRAMUSCULAR; INTRAVENOUS
Status: DISCONTINUED | OUTPATIENT
Start: 2020-05-27 | End: 2020-05-27

## 2020-05-27 RX ORDER — MYCOPHENOLATE MOFETIL 250 MG/1
500 CAPSULE ORAL 2 TIMES DAILY
Status: DISCONTINUED | OUTPATIENT
Start: 2020-05-27 | End: 2020-05-28 | Stop reason: HOSPADM

## 2020-05-27 RX ORDER — PHENYLEPHRINE HYDROCHLORIDE 10 MG/ML
INJECTION INTRAVENOUS
Status: DISCONTINUED | OUTPATIENT
Start: 2020-05-27 | End: 2020-05-27

## 2020-05-27 RX ORDER — ACETAMINOPHEN 10 MG/ML
INJECTION, SOLUTION INTRAVENOUS
Status: DISCONTINUED | OUTPATIENT
Start: 2020-05-27 | End: 2020-05-27

## 2020-05-27 RX ORDER — OXYCODONE HYDROCHLORIDE 5 MG/1
5 TABLET ORAL EVERY 4 HOURS PRN
Status: DISCONTINUED | OUTPATIENT
Start: 2020-05-27 | End: 2020-05-28 | Stop reason: HOSPADM

## 2020-05-27 RX ORDER — VENLAFAXINE HYDROCHLORIDE 75 MG/1
150 CAPSULE, EXTENDED RELEASE ORAL DAILY
Status: DISCONTINUED | OUTPATIENT
Start: 2020-05-28 | End: 2020-05-28 | Stop reason: HOSPADM

## 2020-05-27 RX ORDER — SODIUM CHLORIDE 9 MG/ML
INJECTION, SOLUTION INTRAVENOUS CONTINUOUS
Status: DISCONTINUED | OUTPATIENT
Start: 2020-05-27 | End: 2020-05-27

## 2020-05-27 RX ORDER — ONDANSETRON 2 MG/ML
4 INJECTION INTRAMUSCULAR; INTRAVENOUS ONCE AS NEEDED
Status: DISCONTINUED | OUTPATIENT
Start: 2020-05-27 | End: 2020-05-27 | Stop reason: HOSPADM

## 2020-05-27 RX ORDER — SODIUM CHLORIDE, SODIUM LACTATE, POTASSIUM CHLORIDE, CALCIUM CHLORIDE 600; 310; 30; 20 MG/100ML; MG/100ML; MG/100ML; MG/100ML
INJECTION, SOLUTION INTRAVENOUS CONTINUOUS
Status: DISCONTINUED | OUTPATIENT
Start: 2020-05-27 | End: 2020-05-28

## 2020-05-27 RX ORDER — SODIUM CHLORIDE 0.9 % (FLUSH) 0.9 %
10 SYRINGE (ML) INJECTION
Status: DISCONTINUED | OUTPATIENT
Start: 2020-05-27 | End: 2020-05-28 | Stop reason: HOSPADM

## 2020-05-27 RX ORDER — MIDAZOLAM HYDROCHLORIDE 1 MG/ML
INJECTION, SOLUTION INTRAMUSCULAR; INTRAVENOUS
Status: DISCONTINUED | OUTPATIENT
Start: 2020-05-27 | End: 2020-05-27

## 2020-05-27 RX ORDER — LIDOCAINE HYDROCHLORIDE 10 MG/ML
1 INJECTION, SOLUTION EPIDURAL; INFILTRATION; INTRACAUDAL; PERINEURAL ONCE
Status: COMPLETED | OUTPATIENT
Start: 2020-05-27 | End: 2020-05-27

## 2020-05-27 RX ORDER — PROPOFOL 10 MG/ML
VIAL (ML) INTRAVENOUS
Status: DISCONTINUED | OUTPATIENT
Start: 2020-05-27 | End: 2020-05-27

## 2020-05-27 RX ORDER — HYDROCODONE BITARTRATE AND ACETAMINOPHEN 5; 325 MG/1; MG/1
1 TABLET ORAL EVERY 4 HOURS PRN
Status: DISCONTINUED | OUTPATIENT
Start: 2020-05-27 | End: 2020-05-28 | Stop reason: HOSPADM

## 2020-05-27 RX ORDER — OXYCODONE HYDROCHLORIDE 5 MG/1
5 TABLET ORAL EVERY 6 HOURS PRN
Status: DISCONTINUED | OUTPATIENT
Start: 2020-05-27 | End: 2020-05-28 | Stop reason: HOSPADM

## 2020-05-27 RX ORDER — ASPIRIN 81 MG/1
81 TABLET ORAL DAILY
Status: DISCONTINUED | OUTPATIENT
Start: 2020-05-28 | End: 2020-05-28 | Stop reason: HOSPADM

## 2020-05-27 RX ORDER — TACROLIMUS 1 MG/1
2 CAPSULE ORAL EVERY MORNING
Status: DISCONTINUED | OUTPATIENT
Start: 2020-05-28 | End: 2020-05-28 | Stop reason: HOSPADM

## 2020-05-27 RX ORDER — SODIUM CHLORIDE 9 MG/ML
INJECTION, SOLUTION INTRAVENOUS CONTINUOUS PRN
Status: DISCONTINUED | OUTPATIENT
Start: 2020-05-27 | End: 2020-05-27

## 2020-05-27 RX ORDER — FENTANYL CITRATE 50 UG/ML
25 INJECTION, SOLUTION INTRAMUSCULAR; INTRAVENOUS EVERY 5 MIN PRN
Status: COMPLETED | OUTPATIENT
Start: 2020-05-27 | End: 2020-05-27

## 2020-05-27 RX ORDER — HYDROMORPHONE HYDROCHLORIDE 1 MG/ML
0.2 INJECTION, SOLUTION INTRAMUSCULAR; INTRAVENOUS; SUBCUTANEOUS EVERY 5 MIN PRN
Status: COMPLETED | OUTPATIENT
Start: 2020-05-27 | End: 2020-05-27

## 2020-05-27 RX ORDER — ROCURONIUM BROMIDE 10 MG/ML
INJECTION, SOLUTION INTRAVENOUS
Status: DISCONTINUED | OUTPATIENT
Start: 2020-05-27 | End: 2020-05-27

## 2020-05-27 RX ORDER — LIDOCAINE HYDROCHLORIDE 10 MG/ML
1 INJECTION, SOLUTION EPIDURAL; INFILTRATION; INTRACAUDAL; PERINEURAL ONCE
Status: DISCONTINUED | OUTPATIENT
Start: 2020-05-27 | End: 2020-05-28 | Stop reason: HOSPADM

## 2020-05-27 RX ORDER — METHYLENE BLUE 5 MG/ML
INJECTION INTRAVENOUS
Status: DISCONTINUED | OUTPATIENT
Start: 2020-05-27 | End: 2020-05-27 | Stop reason: HOSPADM

## 2020-05-27 RX ORDER — ONDANSETRON 8 MG/1
8 TABLET, ORALLY DISINTEGRATING ORAL EVERY 8 HOURS PRN
Status: DISCONTINUED | OUTPATIENT
Start: 2020-05-27 | End: 2020-05-28 | Stop reason: HOSPADM

## 2020-05-27 RX ORDER — GABAPENTIN 300 MG/1
300 CAPSULE ORAL NIGHTLY
Status: DISCONTINUED | OUTPATIENT
Start: 2020-05-27 | End: 2020-05-28 | Stop reason: HOSPADM

## 2020-05-27 RX ORDER — TALC
6 POWDER (GRAM) TOPICAL NIGHTLY PRN
Status: DISCONTINUED | OUTPATIENT
Start: 2020-05-27 | End: 2020-05-28 | Stop reason: HOSPADM

## 2020-05-27 RX ORDER — AMLODIPINE BESYLATE 5 MG/1
5 TABLET ORAL DAILY
Status: DISCONTINUED | OUTPATIENT
Start: 2020-05-28 | End: 2020-05-28 | Stop reason: HOSPADM

## 2020-05-27 RX ORDER — TACROLIMUS 1 MG/1
3 CAPSULE ORAL ONCE
Status: COMPLETED | OUTPATIENT
Start: 2020-05-27 | End: 2020-05-27

## 2020-05-27 RX ORDER — ATORVASTATIN CALCIUM 10 MG/1
20 TABLET, FILM COATED ORAL DAILY
Status: DISCONTINUED | OUTPATIENT
Start: 2020-05-28 | End: 2020-05-28 | Stop reason: HOSPADM

## 2020-05-27 RX ORDER — CEFAZOLIN SODIUM 1 G/3ML
2 INJECTION, POWDER, FOR SOLUTION INTRAMUSCULAR; INTRAVENOUS
Status: COMPLETED | OUTPATIENT
Start: 2020-05-27 | End: 2020-05-27

## 2020-05-27 RX ORDER — FENTANYL CITRATE 50 UG/ML
INJECTION, SOLUTION INTRAMUSCULAR; INTRAVENOUS
Status: DISCONTINUED | OUTPATIENT
Start: 2020-05-27 | End: 2020-05-27

## 2020-05-27 RX ORDER — HYDROMORPHONE HYDROCHLORIDE 1 MG/ML
INJECTION, SOLUTION INTRAMUSCULAR; INTRAVENOUS; SUBCUTANEOUS
Status: COMPLETED
Start: 2020-05-27 | End: 2020-05-27

## 2020-05-27 RX ORDER — EPINEPHRINE 0.1 MG/ML
INJECTION INTRAVENOUS
Status: DISCONTINUED | OUTPATIENT
Start: 2020-05-27 | End: 2020-05-27

## 2020-05-27 RX ORDER — PREDNISONE 5 MG/1
5 TABLET ORAL DAILY
Status: DISCONTINUED | OUTPATIENT
Start: 2020-05-28 | End: 2020-05-28 | Stop reason: HOSPADM

## 2020-05-27 RX ORDER — DEXAMETHASONE SODIUM PHOSPHATE 4 MG/ML
INJECTION, SOLUTION INTRA-ARTICULAR; INTRALESIONAL; INTRAMUSCULAR; INTRAVENOUS; SOFT TISSUE
Status: DISCONTINUED | OUTPATIENT
Start: 2020-05-27 | End: 2020-05-27

## 2020-05-27 RX ORDER — ACETAMINOPHEN 325 MG/1
650 TABLET ORAL EVERY 8 HOURS PRN
Status: DISCONTINUED | OUTPATIENT
Start: 2020-05-27 | End: 2020-05-28 | Stop reason: HOSPADM

## 2020-05-27 RX ORDER — ZOLPIDEM TARTRATE 5 MG/1
5 TABLET ORAL NIGHTLY
Status: DISCONTINUED | OUTPATIENT
Start: 2020-05-27 | End: 2020-05-28 | Stop reason: HOSPADM

## 2020-05-27 RX ORDER — DIPHENHYDRAMINE HCL 25 MG
25 CAPSULE ORAL ONCE
Status: COMPLETED | OUTPATIENT
Start: 2020-05-27 | End: 2020-05-27

## 2020-05-27 RX ORDER — LIDOCAINE HYDROCHLORIDE 20 MG/ML
INJECTION INTRAVENOUS
Status: DISCONTINUED | OUTPATIENT
Start: 2020-05-27 | End: 2020-05-27

## 2020-05-27 RX ADMIN — SODIUM CHLORIDE: 0.9 INJECTION, SOLUTION INTRAVENOUS at 11:05

## 2020-05-27 RX ADMIN — EPINEPHRINE 10 MCG: 0.1 INJECTION, SOLUTION ENDOTRACHEAL; INTRACARDIAC; INTRAVENOUS at 01:05

## 2020-05-27 RX ADMIN — ACETAMINOPHEN 1000 MG: 10 INJECTION, SOLUTION INTRAVENOUS at 12:05

## 2020-05-27 RX ADMIN — ROCURONIUM BROMIDE 50 MG: 10 INJECTION, SOLUTION INTRAVENOUS at 12:05

## 2020-05-27 RX ADMIN — ZOLPIDEM TARTRATE 5 MG: 5 TABLET ORAL at 10:05

## 2020-05-27 RX ADMIN — SUGAMMADEX 180 MG: 100 INJECTION, SOLUTION INTRAVENOUS at 03:05

## 2020-05-27 RX ADMIN — HYDROMORPHONE HYDROCHLORIDE 0.2 MG: 1 INJECTION, SOLUTION INTRAMUSCULAR; INTRAVENOUS; SUBCUTANEOUS at 04:05

## 2020-05-27 RX ADMIN — HYDROMORPHONE HYDROCHLORIDE 0.2 MG: 1 INJECTION, SOLUTION INTRAMUSCULAR; INTRAVENOUS; SUBCUTANEOUS at 05:05

## 2020-05-27 RX ADMIN — MYCOPHENOLATE MOFETIL 500 MG: 250 CAPSULE ORAL at 10:05

## 2020-05-27 RX ADMIN — SODIUM CHLORIDE, SODIUM GLUCONATE, SODIUM ACETATE, POTASSIUM CHLORIDE, MAGNESIUM CHLORIDE, SODIUM PHOSPHATE, DIBASIC, AND POTASSIUM PHOSPHATE: .53; .5; .37; .037; .03; .012; .00082 INJECTION, SOLUTION INTRAVENOUS at 01:05

## 2020-05-27 RX ADMIN — SODIUM CHLORIDE, SODIUM LACTATE, POTASSIUM CHLORIDE, AND CALCIUM CHLORIDE: .6; .31; .03; .02 INJECTION, SOLUTION INTRAVENOUS at 04:05

## 2020-05-27 RX ADMIN — PHENYLEPHRINE HYDROCHLORIDE 100 MCG: 10 INJECTION INTRAVENOUS at 01:05

## 2020-05-27 RX ADMIN — OXYCODONE HYDROCHLORIDE 5 MG: 5 TABLET ORAL at 08:05

## 2020-05-27 RX ADMIN — SODIUM CHLORIDE: 0.9 INJECTION, SOLUTION INTRAVENOUS at 12:05

## 2020-05-27 RX ADMIN — LIDOCAINE HYDROCHLORIDE 50 MG: 20 INJECTION, SOLUTION INTRAVENOUS at 12:05

## 2020-05-27 RX ADMIN — GABAPENTIN 300 MG: 300 CAPSULE ORAL at 10:05

## 2020-05-27 RX ADMIN — DEXAMETHASONE SODIUM PHOSPHATE 4 MG: 4 INJECTION, SOLUTION INTRAMUSCULAR; INTRAVENOUS at 12:05

## 2020-05-27 RX ADMIN — PROPOFOL 150 MG: 10 INJECTION, EMULSION INTRAVENOUS at 12:05

## 2020-05-27 RX ADMIN — FENTANYL CITRATE 25 MCG: 50 INJECTION INTRAMUSCULAR; INTRAVENOUS at 03:05

## 2020-05-27 RX ADMIN — ONDANSETRON 4 MG: 2 INJECTION, SOLUTION INTRAMUSCULAR; INTRAVENOUS at 12:05

## 2020-05-27 RX ADMIN — FENTANYL CITRATE 100 MCG: 50 INJECTION, SOLUTION INTRAMUSCULAR; INTRAVENOUS at 12:05

## 2020-05-27 RX ADMIN — CEFAZOLIN 2 G: 330 INJECTION, POWDER, FOR SOLUTION INTRAMUSCULAR; INTRAVENOUS at 12:05

## 2020-05-27 RX ADMIN — LIDOCAINE HYDROCHLORIDE 10 MG: 10 INJECTION, SOLUTION EPIDURAL; INFILTRATION; INTRACAUDAL; PERINEURAL at 11:05

## 2020-05-27 RX ADMIN — OXYCODONE HYDROCHLORIDE 5 MG: 5 TABLET ORAL at 03:05

## 2020-05-27 RX ADMIN — PROPOFOL 50 MG: 10 INJECTION, EMULSION INTRAVENOUS at 02:05

## 2020-05-27 RX ADMIN — TACROLIMUS 3 MG: 1 CAPSULE ORAL at 10:05

## 2020-05-27 RX ADMIN — MIDAZOLAM HYDROCHLORIDE 2 MG: 1 INJECTION, SOLUTION INTRAMUSCULAR; INTRAVENOUS at 12:05

## 2020-05-27 RX ADMIN — DIPHENHYDRAMINE HYDROCHLORIDE 25 MG: 25 CAPSULE ORAL at 10:05

## 2020-05-27 NOTE — NURSING TRANSFER
Nursing Transfer Note      5/27/2020     Transfer To: 557    Transfer via stretcher    Transfer with NA    Transported by transport    Medicines sent: Chlorseptic spray and lozanges    Chart send with patient: Yes    Notified: friend    Patient reassessed at: 05/27/2020 at 1850    Personal belongings sent with patient

## 2020-05-27 NOTE — ANESTHESIA POSTPROCEDURE EVALUATION
Anesthesia Post Evaluation    Patient: Deborah Navas    Procedure(s) Performed: Procedure(s) (LRB):  EXPLORATION, WOUND. Lymphocele (Right)    Final Anesthesia Type: general    Patient location during evaluation: PACU  Patient participation: Yes- Able to Participate  Level of consciousness: awake and alert and oriented  Post-procedure vital signs: reviewed and stable  Pain management: adequate  Airway patency: patent    PONV status at discharge: No PONV  Anesthetic complications: no      Cardiovascular status: hemodynamically stable  Respiratory status: unassisted  Hydration status: euvolemic  Follow-up not needed.          Vitals Value Taken Time   /74 5/27/2020  5:01 PM   Temp 36.7 °C (98.1 °F) 5/27/2020  4:30 PM   Pulse 81 5/27/2020  5:16 PM   Resp 10 5/27/2020  5:16 PM   SpO2 97 % 5/27/2020  5:16 PM   Vitals shown include unvalidated device data.      No case tracking events are documented in the log.      Pain/Zaria Score: Pain Rating Prior to Med Admin: 7 (5/27/2020  4:31 PM)  Zaria Score: 10 (5/27/2020  4:30 PM)

## 2020-05-27 NOTE — TRANSFER OF CARE
"Anesthesia Transfer of Care Note    Patient: Deborah Navas    Procedure(s) Performed: Procedure(s) (LRB):  EXPLORATION, WOUND. Lymphocele (Right)    Patient location: PACU    Anesthesia Type: general    Transport from OR: Transported from OR on 6-10 L/min O2 by face mask with adequate spontaneous ventilation    Post pain: pain needs to be addressed (administering fentanyl per post-op orders)    Post assessment: no apparent anesthetic complications    Post vital signs: stable    Level of consciousness: awake and alert    Nausea/Vomiting: no nausea/vomiting    Complications: none    Transfer of care protocol was followed      Last vitals:   Visit Vitals  BP (!) 148/52   Pulse (!) 62   Temp 37.1 °C (98.8 °F) (Oral)   Resp 20   Ht 5' 7" (1.702 m)   Wt 89.8 kg (198 lb)   LMP  (LMP Unknown)   SpO2 100%   Breastfeeding? No   BMI 31.01 kg/m²     "

## 2020-05-27 NOTE — BRIEF OP NOTE
VASCULAR SURGERY SERVICE  BRIEF OP NOTE    Date of Procedure: 5/27/2020    Surgeon: COBY Bledsoe II, MD    Assistant(s): Meet Cazares MD PGY-6   Denice Meehan MD PGY-1    Pre-Op Diagnosis:   Large lymphocele after open femoral artery cannulation for heart transplant    Post-Op Diagnosis:   Same    Procedure(s):    Excision of large lymphocele (>16 x 8 x 6cm) in right groin   Complex wound closure (16 x 8 x 6cm) right groin    Anesthesia:    GETA    Findings / Key Elements:    Large lymphocele resected intact.    All visible lymphatic channels ligated   Large wound cavity required complex closure    Estimated Blood Loss: <50mL    Specimens:   Specimen (12h ago, onward)    None          Meet Cazares MD PGY6  Vascular and Endovascular Surgery Fellow  05/27/2020

## 2020-05-27 NOTE — H&P
[facility]  General Surgery  History & Physical    Patient Name: Deborah Navas  MRN: [id number]  Admission Date: 5/27/2020  Attending Physician: NYDIA Bledsoe II, MD   Primary Care Provider: Valeria Verma MD    Patient information was obtained from patient and past medical records.     Subjective:     Chief Complaint/Reason for Admission: OP surgery    History of Present Illness:  Patient is a 49 y.o. female w/ PMH of restrictive cardiomyopathy s/p OHTx 12/16/19. Post-op course complicated by right inguinal lymphocele which has been drained several times without much success. She presents today for planned surgical excision. Patient last seen in clinic on 3/20 and denies any changes since then besides slight increased in size of the lymphocele.     No current facility-administered medications on file prior to encounter.      Current Outpatient Medications on File Prior to Encounter   Medication Sig    amLODIPine (NORVASC) 5 MG tablet Take 1 tablet (5 mg total) by mouth once daily.    aspirin (ECOTRIN) 81 MG EC tablet Take 1 tablet (81 mg total) by mouth once daily.    atorvastatin (LIPITOR) 20 MG tablet Take 1 tablet (20 mg total) by mouth once daily. (Patient taking differently: Take 20 mg by mouth every evening. )    calcium carbonate-vitamin D3 1,000 mg(2,500 mg)-800 unit Tab Take 1 tablet by mouth once daily.    gabapentin (NEURONTIN) 300 MG capsule Take 1 capsule (300 mg total) by mouth every evening.    magnesium oxide (MAG-OX) 400 mg (241.3 mg magnesium) tablet Take 1 tablet (400 mg total) by mouth once daily.    mycophenolate (CELLCEPT) 250 mg Cap Take 2 capsules (500 mg total) by mouth 2 (two) times daily.    pantoprazole (PROTONIX) 40 MG tablet Take 1 tablet (40 mg total) by mouth once daily.    senna-docusate 8.6-50 mg (PERICOLACE) 8.6-50 mg per tablet Take 2 tablets by mouth 2 (two) times daily as needed for Constipation.    tacrolimus (PROGRAF) 1 MG Cap Take  "2 capsules (2 mg total) by mouth every morning AND 3 capsules (3 mg total) every evening.    venlafaxine (EFFEXOR-XR) 150 MG Cp24 Take 1 capsule (150 mg total) by mouth once daily.    opw transplant care kit Welcome to [facility].  This is your transplant care kit.    oxyCODONE (ROXICODONE) 10 mg Tab immediate release tablet Take 1 tablet (10 mg total) by mouth every 8 (eight) hours as needed.       Review of patient's allergies indicates:   Allergen Reactions    Adhesive Blisters     "Reaction to chest only up to neck. Denies any problems w/adhesive on any other areas of the body.    Codeine Itching       Past Medical History:   Diagnosis Date    Fractures     History of ventricular fibrillation 9/4/2019    History of ventricular tachycardia 9/4/2019    Hyperlipidemia     Migraine     Multinodular goiter 9/5/2019    Osteoarthritis     Restrictive cardiomyopathy post heart transplant      Past Surgical History:   Procedure Laterality Date    BACK SURGERY      2007    BIOPSY WITH ULTRASOUND GUIDANCE N/A 12/31/2019    Procedure: BIOPSY, WITH US GUIDANCE;  Surgeon: Eric Antunez Jr., MD;  Location: [facility] CATH LAB;  Service: Cardiology;  Laterality: N/A;    BIOPSY WITH ULTRASOUND GUIDANCE N/A 1/7/2020    Procedure: BIOPSY, WITH US GUIDANCE;  Surgeon: Renetta Chaudhari MD;  Location: [facility] CATH LAB;  Service: Cardiology;  Laterality: N/A;    BIOPSY WITH ULTRASOUND GUIDANCE N/A 1/14/2020    Procedure: BIOPSY, WITH US GUIDANCE;  Surgeon: Renetta Chaudhari MD;  Location: [facility] CATH LAB;  Service: Cardiology;  Laterality: N/A;    BIOPSY WITH ULTRASOUND GUIDANCE N/A 1/28/2020    Procedure: BIOPSY, WITH US GUIDANCE;  Surgeon: Jolene Lua MD;  Location: [facility] CATH LAB;  Service: Cardiology;  Laterality: N/A;    BIOPSY WITH ULTRASOUND GUIDANCE N/A 2/11/2020    Procedure: BIOPSY, WITH US GUIDANCE;  Surgeon: Renetta Chaudhari MD;  Location: [facility] CATH LAB;  Service: Cardiology;  Laterality: N/A; "    BIOPSY WITH ULTRASOUND GUIDANCE N/A 3/10/2020    Procedure: BIOPSY, WITH US GUIDANCE;  Surgeon: Jolene Lua MD;  Location: [facility] CATH LAB;  Service: Cardiology;  Laterality: N/A;    BONE GRAFT Left     from Left hip to Left FA    eardrum reconstruction  1980    ELBOW SURGERY Left [id number]    FOREARM FRACTURE SURGERY Bilateral [id number]    multiple surgeries    HEART TRANSPLANT N/A 12/16/2019    Procedure: TRANSPLANT, HEART;  Surgeon: Talha Nuñez MD;  [facility];  Service: Cardiothoracic;  Laterality: N/A;    INSERTION OF GRAFT TO PERICARDIUM  9/10/2019    Procedure: INSERTION, GRAFT, PERICARDIUM;  Surgeon: Shar Walden MD;  [facility];  Service: Cardiovascular;;    INSERTION OF IMPLANTABLE CARDIOVERTER-DEFIBRILLATOR (ICD) GENERATOR WITH TWO EXISTING LEADS      INSERTION OF PACEMAKER Left     LEFT VENTRICULAR ASSIST DEVICE N/A 9/10/2019    Procedure: INSERTION-LEFT VENTRICULAR ASSIST DEVICE;  Surgeon: Shar Walden MD;  [facility];  Service: Cardiovascular;  Laterality: N/A;  DT HM3     LUMBAR FUSION  2007    L4-L5    RIGHT HEART CATHETERIZATION Right 9/4/2019    Procedure: INSERTION, CATHETER, RIGHT HEART;  Surgeon: Vi Bryant MD;  Location: [facility] CATH LAB;  Service: Cardiology;  Laterality: Right;    RIGHT HEART CATHETERIZATION N/A 11/18/2019    Procedure: INSERTION, CATHETER, RIGHT HEART;  Surgeon: Eric Antunez Jr., MD;  Location: [facility] CATH LAB;  Service: Cardiology;  Laterality: N/A;    RIGHT HEART CATHETERIZATION Right 12/31/2019    Procedure: INSERTION, CATHETER, RIGHT HEART;  Surgeon: Eric Antunez Jr., MD;  Location: [facility] CATH LAB;  Service: Cardiology;  Laterality: Right;    RIGHT HEART CATHETERIZATION Right 1/7/2020    Procedure: INSERTION, CATHETER, RIGHT HEART;  Surgeon: Renetta Chaudhari MD;  Location: [facility] CATH LAB;  Service: Cardiology;  Laterality: Right;    SINUS SURGERY Right 1994    with lymph nodes     STERNAL WOUND CLOSURE  9/10/2019    Procedure: CLOSURE, WOUND, STERNUM;  Surgeon: Shar Walden MD;  [facility] OR [facility];  Service: Cardiovascular;;    TEMPOROMANDIBULAR JOINT SURGERY Right 1988    TONSILLECTOMY      TREATMENT OF CARDIAC ARRHYTHMIA N/A 9/24/2019    Procedure: CARDIOVERSION;  Surgeon: Moe Barrera MD;  Location: [facility] EP LAB;  Service: Cardiology;  Laterality: N/A;  AF, DCCV/NADINE, anes, DM, Rm 3073    TYMPANOSTOMY TUBE PLACEMENT  1971- 1979    multiple tube placements     Family History     Problem Relation (Age of Onset)    Broken bones Maternal Grandmother    Cancer Paternal Grandmother    Dislocations Maternal Grandmother    Heart failure Paternal Grandfather, Maternal Grandfather    Migraines Mother, Maternal Grandmother, Paternal Grandmother, Paternal Grandfather, Maternal Grandfather    Obesity Paternal Grandmother    Osteoarthritis Mother, Maternal Grandmother, Paternal Grandmother, Paternal Grandfather, Maternal Grandfather, Father    Osteoporosis Maternal Grandmother    Scoliosis Maternal Grandmother    Stroke Father        Tobacco Use    Smoking status: Never Smoker    Smokeless tobacco: Never Used   Substance and Sexual Activity    Alcohol use: No    Drug use: No    Sexual activity: Not Currently     Review of Systems   Constitutional: Negative.    HENT: Negative.    Eyes: Negative.    Respiratory: Negative.    Cardiovascular: Negative.    Gastrointestinal: Negative.    Endocrine: Negative.    Genitourinary: Negative.    Musculoskeletal: Negative.    Skin: Negative.    Neurological: Negative.    Hematological: Negative.    Psychiatric/Behavioral: Negative.      Objective:     Vital Signs (Most Recent):    Vital Signs (24h Range):           There is no height or weight on file to calculate BMI.    Physical Exam   Constitutional: She is oriented to person, place, and time. She appears well-developed and well-nourished.   HENT:   Head: Normocephalic and atraumatic.   Eyes:  Pupils are equal, round, and reactive to light. EOM are normal.   Neck: Neck supple.   Cardiovascular: Normal rate and regular rhythm.   Well healed sternotomy incision   Pulmonary/Chest: Effort normal.   Abdominal: Soft.   Genitourinary:   Genitourinary Comments: Well healed right groin incision. Underlying soft mass, non-tender. No overlying skin changes   Neurological: She is alert and oriented to person, place, and time.   Skin: Skin is warm and dry.   Psychiatric: She has a normal mood and affect. Her behavior is normal.   Nursing note and vitals reviewed.      Significant Labs:  CBC: No results for input(s): WBC, RBC, HGB, HCT, PLT, MCV, MCH, MCHC in the last 168 hours.  BMP: No results for input(s): GLU, NA, K, CL, CO2, BUN, CREATININE, CALCIUM, MG in the last 168 hours.  CMP: No results for input(s): GLU, CALCIUM, ALBUMIN, PROT, NA, K, CO2, CL, BUN, CREATININE, ALKPHOS, ALT, AST, BILITOT in the last 168 hours.  LFTs: No results for input(s): ALT, AST, ALKPHOS, BILITOT, PROT, ALBUMIN in the last 168 hours.    Significant Diagnostics:  I have reviewed all pertinent imaging results/findings within the past 24 hours.    Assessment/Plan:     49 y.o. female s/p heart transplant 12/16/20 now with persistent right grion lymphocele/seroma     - To OR for exploration/resection     Tre Watt MD  General Surgery  [facility]

## 2020-05-27 NOTE — ANESTHESIA PROCEDURE NOTES
Intubation  Performed by: Mya Heredia CRNA  Authorized by: Antelmo Pinzon MD     Intubation:     Induction:  Intravenous    Intubated:  Postinduction    Mask Ventilation:  Easy with oral airway    Attempts:  1    Attempted By:  CRNA    Method of Intubation:  Video laryngoscopy    Blade:  Prieto 3    Laryngeal View Grade: Grade I - full view of chords      Difficult Airway Encountered?: No      Complications:  None    Airway Device:  Oral endotracheal tube    Airway Device Size:  7.0    Style/Cuff Inflation:  Cuffed (inflated to minimal occlusive pressure)    Tube secured:  22    Secured at:  The lips    Placement Verified By:  Capnometry    Complicating Factors:  Small mouth, obesity, short neck, oropharyngeal edema or fat and anterior larynx    Findings Post-Intubation:  BS equal bilateral

## 2020-05-28 ENCOUNTER — TELEPHONE (OUTPATIENT)
Dept: SLEEP MEDICINE | Facility: OTHER | Age: 50
End: 2020-05-28

## 2020-05-28 VITALS
RESPIRATION RATE: 18 BRPM | HEIGHT: 67 IN | OXYGEN SATURATION: 96 % | HEART RATE: 89 BPM | BODY MASS INDEX: 31.08 KG/M2 | SYSTOLIC BLOOD PRESSURE: 121 MMHG | TEMPERATURE: 99 F | DIASTOLIC BLOOD PRESSURE: 69 MMHG | WEIGHT: 198 LBS

## 2020-05-28 LAB
BASOPHILS # BLD AUTO: 0.02 K/UL (ref 0–0.2)
BASOPHILS NFR BLD: 0.3 % (ref 0–1.9)
DIFFERENTIAL METHOD: ABNORMAL
EOSINOPHIL # BLD AUTO: 0 K/UL (ref 0–0.5)
EOSINOPHIL NFR BLD: 0.1 % (ref 0–8)
ERYTHROCYTE [DISTWIDTH] IN BLOOD BY AUTOMATED COUNT: 15.1 % (ref 11.5–14.5)
HCT VFR BLD AUTO: 29.8 % (ref 37–48.5)
HGB BLD-MCNC: 8.4 G/DL (ref 12–16)
IMM GRANULOCYTES # BLD AUTO: 0.1 K/UL (ref 0–0.04)
IMM GRANULOCYTES NFR BLD AUTO: 1.3 % (ref 0–0.5)
LYMPHOCYTES # BLD AUTO: 0.4 K/UL (ref 1–4.8)
LYMPHOCYTES NFR BLD: 4.7 % (ref 18–48)
MCH RBC QN AUTO: 27.6 PG (ref 27–31)
MCHC RBC AUTO-ENTMCNC: 28.2 G/DL (ref 32–36)
MCV RBC AUTO: 98 FL (ref 82–98)
MONOCYTES # BLD AUTO: 0.8 K/UL (ref 0.3–1)
MONOCYTES NFR BLD: 9.6 % (ref 4–15)
NEUTROPHILS # BLD AUTO: 6.7 K/UL (ref 1.8–7.7)
NEUTROPHILS NFR BLD: 84 % (ref 38–73)
NRBC BLD-RTO: 0 /100 WBC
PLATELET # BLD AUTO: 286 K/UL (ref 150–350)
PMV BLD AUTO: 9.3 FL (ref 9.2–12.9)
RBC # BLD AUTO: 3.04 M/UL (ref 4–5.4)
WBC # BLD AUTO: 7.95 K/UL (ref 3.9–12.7)

## 2020-05-28 PROCEDURE — 63600175 PHARM REV CODE 636 W HCPCS: Performed by: STUDENT IN AN ORGANIZED HEALTH CARE EDUCATION/TRAINING PROGRAM

## 2020-05-28 PROCEDURE — 85025 COMPLETE CBC W/AUTO DIFF WBC: CPT

## 2020-05-28 PROCEDURE — 36415 COLL VENOUS BLD VENIPUNCTURE: CPT

## 2020-05-28 PROCEDURE — 25000003 PHARM REV CODE 250: Performed by: STUDENT IN AN ORGANIZED HEALTH CARE EDUCATION/TRAINING PROGRAM

## 2020-05-28 RX ORDER — HYDROCODONE BITARTRATE AND ACETAMINOPHEN 5; 325 MG/1; MG/1
1 TABLET ORAL EVERY 4 HOURS PRN
Qty: 15 TABLET | Refills: 0 | Status: SHIPPED | OUTPATIENT
Start: 2020-05-28 | End: 2020-06-25

## 2020-05-28 RX ADMIN — ATORVASTATIN CALCIUM 20 MG: 10 TABLET, FILM COATED ORAL at 09:05

## 2020-05-28 RX ADMIN — OXYCODONE HYDROCHLORIDE 5 MG: 5 TABLET ORAL at 06:05

## 2020-05-28 RX ADMIN — MYCOPHENOLATE MOFETIL 500 MG: 250 CAPSULE ORAL at 09:05

## 2020-05-28 RX ADMIN — ASPIRIN 81 MG: 81 TABLET, DELAYED RELEASE ORAL at 09:05

## 2020-05-28 RX ADMIN — AMLODIPINE BESYLATE 5 MG: 5 TABLET ORAL at 11:05

## 2020-05-28 RX ADMIN — OXYCODONE HYDROCHLORIDE 5 MG: 5 TABLET ORAL at 11:05

## 2020-05-28 RX ADMIN — VENLAFAXINE HYDROCHLORIDE 150 MG: 75 CAPSULE, EXTENDED RELEASE ORAL at 09:05

## 2020-05-28 RX ADMIN — PREDNISONE 5 MG: 5 TABLET ORAL at 09:05

## 2020-05-28 RX ADMIN — TACROLIMUS 2 MG: 1 CAPSULE ORAL at 09:05

## 2020-05-28 NOTE — PLAN OF CARE
Problem: Wound  Goal: Optimal Wound Healing  Outcome: Ongoing, Progressing     Problem: Adult Inpatient Plan of Care  Goal: Plan of Care Review  Outcome: Ongoing, Progressing     Problem: Adult Inpatient Plan of Care  Goal: Optimal Comfort and Wellbeing  Outcome: Ongoing, Progressing     Problem: Fall Injury Risk  Goal: Absence of Fall and Fall-Related Injury  Outcome: Ongoing, Progressing     Pt sitting up in bed with no distress noted.  Approved visitor at bedside.  WV dressing to right groin intact.  Pulses, color and temp to RLE wnl.  SCDs on.  Pt c/o itching.  Dr on call notified and Benadryl ordered.  IV to left hand patent with no redness or swelling noted.  LR @ 125 cc/hr.  Tolerating PO well with no nausea reported.  Oriented pt to room.  Call light within reach.  Bed locked and lowered for safety.  IS at bedside and in reach.  Pt demonstrates appropriate use and frequency.  No needs currently voiced at this time.

## 2020-05-28 NOTE — DISCHARGE SUMMARY
[facility]  Vascular Surgery  Discharge Summary      Patient Name: Deborah Navas  MRN: [id number]  Admission Date: 5/27/2020  Hospital Length of Stay: 0 days  Discharge Date and Time:  05/28/2020 7:36 AM  Attending Physician: NYDIA Bledsoe II, MD   Discharging Provider: Denice Meehan MD  Primary Care Provider: Valeria Verma MD     HPI: Patient is a 49 y.o. female w/ PMH of restrictive cardiomyopathy s/p OHTx 12/16/19. Post-op course complicated by right inguinal lymphocele which has been drained several times without much success. She presents today for planned surgical excision. Patient last seen in clinic on 3/20 and denies any changes since then besides slight increased in size of the lymphocele.     Procedure(s) (LRB):  EXPLORATION, WOUND. Lymphocele (Right)     Hospital Course: Patient underwent an excision of a large lymphocele in the right groin (16 x 8 x 6cm), complex wound closure on 5/27/20. Proveena wound vac placed over incision. She tolerated the procedure well and was transferred to the floor after an uncomplicated recovery period. Her pain was well controlled on oral medications. On post-operative day 1, she is tolerating a regular diet, pain is well controlled, and proveena wound vac with scant output. She is counseled on clinic visit in 1 week to remove wound vac, 2 weeks to remove nylon sutures. Pain medication to be delivered to bedside. She has no questions and is ready for discharge. She will follow-up in clinic post-operatively.     Consults:     Significant Diagnostic Studies: Labs:   BMP: No results for input(s): GLU, NA, K, CL, CO2, BUN, CREATININE, CALCIUM, MG in the last 48 hours., CMP No results for input(s): NA, K, CL, CO2, GLU, BUN, CREATININE, CALCIUM, PROT, ALBUMIN, BILITOT, ALKPHOS, AST, ALT, ANIONGAP, ESTGFRAFRICA, EGFRNONAA in the last 48 hours., CBC   Recent Labs   Lab 05/28/20  0334   WBC 7.95   HGB 8.4*   HCT 29.8*       and INR    Lab Results   Component Value Date    INR 1.2 12/23/2019    INR 1.4 (H) 12/22/2019    INR 1.3 (H) 12/21/2019       Pending Diagnostic Studies:     Procedure Component Value Units Date/Time    Specimen to Pathology, Surgery Gross only [[id number]] Collected:  05/27/20 1430    Order Status:  Sent Lab Status:  In process Updated:  05/27/20 1635        Final Active Diagnoses:    Diagnosis Date Noted POA    Lymphocele [I89.8] 05/27/2020 Yes      Problems Resolved During this Admission:      Discharged Condition: good    Disposition: Home or Self Care    Follow Up:  Follow-up Information     NYDIA Bledsoe II, MD In 2 weeks.    Specialty:  Vascular Surgery  Why:  For wound re-check  Contact information:  [address] [place] [id number] [phone]                   Patient Instructions: Remove wound vac tomorrow. Can keep incision open to air after that or apply dry, clean gauze if there is drainage. No heavy lifting for 4 weeks, activity as tolerated. Patient can shower normally. No soaking in bath or pools for 2 weeks. Call Dr. Bledsoe's office, or [facility] asking for General Surgery on call after hours for any questions.        Diet general     Call MD for:  temperature >100.4     Call MD for:  persistent nausea and vomiting     Call MD for:  severe uncontrolled pain     Call MD for:  redness, tenderness, or signs of infection (pain, swelling, redness, odor or green/yellow discharge around incision site)     Remove right groin wound vac tomorrow   Order Comments: Can leave incision open to air or apply dry, clean gauze if there is any drainage.      Activity as tolerated     Medications:  Reconciled Home Medications:      Medication List      START taking these medications    HYDROcodone-acetaminophen 5-325 mg per tablet  Commonly known as:  NORCO  Take 1 tablet by mouth every 4 (four) hours as needed for Pain.        CHANGE how you take these medications    atorvastatin 20 MG  tablet  Commonly known as:  LIPITOR  Take 1 tablet (20 mg total) by mouth once daily.  What changed:  when to take this        CONTINUE taking these medications    amLODIPine 5 MG tablet  Commonly known as:  NORVASC  Take 1 tablet (5 mg total) by mouth once daily.     aspirin 81 MG EC tablet  Commonly known as:  ECOTRIN  Take 1 tablet (81 mg total) by mouth once daily.     calcium carbonate-vitamin D3 1,000 mg(2,500 mg)-800 unit Tab  Take 1 tablet by mouth once daily.     dapsone 100 MG Tab  Take 1 tablet (100 mg total) by mouth once daily.     ferrous sulfate 325 (65 FE) MG EC tablet  take 1 tablet by mouth twice daily     gabapentin 300 MG capsule  Commonly known as:  NEURONTIN  Take 1 capsule (300 mg total) by mouth every evening.     JANUVIA 50 MG Tab  Generic drug:  SITagliptin  Take 1 tablet (50 mg total) by mouth every morning.     magnesium oxide 400 mg (241.3 mg magnesium) tablet  Commonly known as:  MAG-OX  Take 1 tablet (400 mg total) by mouth once daily.     mycophenolate 250 mg Cap  Commonly known as:  CELLCEPT  Take 2 capsules (500 mg total) by mouth 2 (two) times daily.     [facility] transplant care kit 1 Kit  Welcome to [facility].  This is your transplant care kit.     oxyCODONE 10 mg Tab immediate release tablet  Commonly known as:  ROXICODONE  Take 1 tablet (10 mg total) by mouth every 8 (eight) hours as needed.     pantoprazole 40 MG tablet  Commonly known as:  PROTONIX  Take 1 tablet (40 mg total) by mouth once daily.     predniSONE 5 MG tablet  Commonly known as:  DELTASONE  Take 1 tablet (5 mg total) by mouth once daily.     senna-docusate 8.6-50 mg 8.6-50 mg per tablet  Commonly known as:  PERICOLACE  Take 2 tablets by mouth 2 (two) times daily as needed for Constipation.     tacrolimus 1 MG Cap  Commonly known as:  PROGRAF  Take 2 capsules (2 mg total) by mouth every morning AND 3 capsules (3 mg total) every evening.     venlafaxine 150 MG Cp24  Commonly known as:   EFFEXOR-XR  Take 1 capsule (150 mg total) by mouth once daily.     zolpidem 5 MG Tab  Commonly known as:  AMBIEN  Take 1 tablet (5 mg total) by mouth every evening.            Denice Meehan MD  Vascular Surgery  [facility]

## 2020-05-28 NOTE — OP NOTE
Vascular Surgery Op Note    Date of Operation/Procedure: 5/27/20    Pre-operative Diagnosis: right groin lymphocele    Post-operative Diagnosis: same    Anesthesia: general    Operation/Procedure Performed:   1. Resection of right groin lymphocele  2. Complex wound closure right groin    Attending Surgeon: NYDIA Bledsoe II, MD    Resident/Fellow: Meet Cazares MD PGY6; Lynn Meehan MD PGY1    Indications:   50yo F with h/o heart transplant in December who developed right grion lymphocele at groin cannulation site. The lymphocele did not resolve with percutaneous drainage so she was referred to my office for evaluation. Risks, benefits, and alternatives to the procedure were discussed and the patient agreed to proceed.    Procedure in Detail:   After informed consent was obtained the patient was taken to the operating room in supine position.  Appropriate hemodynamic monitors were put in place by the anesthesia team.  General anesthesia was administered by the anesthesia team.  The right lower abdomen groin and thigh were prepped and draped with ChloraPrep in normal sterile fashion.  A time-out was performed to confirm appropriate patient identification, procedure, and laterality.    We began by injecting the lower medial and anterior thigh with 2 mL of 50% methylene blue.  We then made a 15cm vertical incision over the lymphocele which was deepened with a combination of electrocautery and blunt dissection.  The anterior rind of the lymphocele was identified and the adherent tissues were carefully bovied away.  The lymphocele had already gained a bluish tint from the methylene blue by the time we encountered its anterior surface. Any crossing veins were lymphatic channels were ligated with 2 0 or 3 0 silk ties and divided.  We continued using electrocautery to separate the adherent tissues from the outer rind of the lymphocele. The saphenous vein was encountered medially and protected and retracted medially.  Over the course of an hour we were able to separate all tissues from the rind of the lymphocele except for a 2 cm stalk at the base.  The stalk was clamped with a April clamp.  Curved Fortune scissors were used to divide the stalk and the lymphocele was taken off the field and submitted intact for gross pathology. The stalk was suture ligated with a 0-0 silk ligature.  We then injected methylene blue again in the medial and anterior thigh inferior to the incision.  We observed the tissues within the wound and there was no blue effluent identified.  Satisfied that we had successfully ligated the pathologic lymphatic channels we then thoroughly irrigated the wound with saline irrigation.  Due to the expansion of the tissues by the lymphocele there was a significant amount of dead space within the tissues and excess skin superficially. The wound measured 15cm long by 6cm deep by 8cm wide. In order to close the dead space we placed several interrupted 2-0 vicryl quilting sutures which reapproximated the deep tissues and attached them to the fascia hadley to prevent re-accumulation of fluid.  We then reapproximated the remaining deep tissues in 3 layers of interrupted 2 0 Vicryl sutures.  At this point the deep tissues had been adequately reapproximated but there remained a significant amount of excess dermis/epidermis.  We used a 15 blade and Bovie electrocautery to resect the excess skin to a level that would allow tension free approximation of the wound edges while still preserving adequate thickness and perfusion of the dermis. The deep dermis was then reapproximated with interrupted 3-0 vicryl sutures. The skin was finally closed with interrupted vertical mattress 3-0 nylon sutures and skin staples. The incision was dressed with a prevena negative pressure woundvac to protect the wound and measure if the patient had any recurrent drainage.    All sponge needle and instrument counts were correct at the close of the case.  The patient tolerated the procedure well and was transported to the post-op area for recovery.    Estimated Blood loss: 30ml    Complications: none    NYDIA Bledsoe II, MD, VI  Vascular Surgeon  [facility]

## 2020-05-29 ENCOUNTER — PATIENT MESSAGE (OUTPATIENT)
Dept: TRANSPLANT | Facility: CLINIC | Age: 50
End: 2020-05-29

## 2020-05-29 DIAGNOSIS — Z94.1 HEART REPLACED BY TRANSPLANT: Primary | ICD-10-CM

## 2020-05-29 DIAGNOSIS — Z94.1 HEART TRANSPLANTED: Primary | ICD-10-CM

## 2020-05-29 LAB — CMV DNA SERPL NAA+PROBE-ACNC: NORMAL IU/ML

## 2020-05-29 NOTE — PLAN OF CARE
05/28/20 1346   Final Note   Assessment Type Final Discharge Note   Anticipated Discharge Disposition Home   What phone number can be called within the next 1-3 days to see how you are doing after discharge? [phone]   Hospital Follow Up  Appt(s) scheduled? Yes   Discharge plans and expectations educations in teach back method with documentation complete? Yes     Future Appointments   Date Time Provider Department Center   6/2/2020  9:55 AM LABORATORY, [facility] HGVH LAB [facility]   6/12/2020  1:00 PM NYDIA Bledsoe II, MD [facility] VASCSUR [facility]   6/22/2020  7:30 AM LABORATORY, [facility] HGV[facility] LAB [facility]   6/24/2020  8:45 AM ECHO, [facility] ECHOLAB [facility]   6/24/2020 10:00 AM LAB, APPOINT[facility]   6/24/2020 10:30 AM Renetta Chaudhari MD [facility] HEARTTX [facility]

## 2020-06-01 LAB
FINAL PATHOLOGIC DIAGNOSIS: NORMAL
GROSS: NORMAL

## 2020-06-03 ENCOUNTER — LAB VISIT (OUTPATIENT)
Dept: LAB | Facility: HOSPITAL | Age: 50
End: 2020-06-03
Attending: INTERNAL MEDICINE
Payer: COMMERCIAL

## 2020-06-03 DIAGNOSIS — T86.298 OTHER COMPLICATION OF HEART TRANSPLANT: ICD-10-CM

## 2020-06-03 DIAGNOSIS — Z94.1 STATUS POST HEART TRANSPLANT: ICD-10-CM

## 2020-06-03 DIAGNOSIS — N18.4 CKD (CHRONIC KIDNEY DISEASE), STAGE IV: ICD-10-CM

## 2020-06-03 DIAGNOSIS — Z79.899 ENCOUNTER FOR LONG-TERM (CURRENT) USE OF MEDICATIONS: ICD-10-CM

## 2020-06-03 DIAGNOSIS — Z94.1 HEART TRANSPLANTED: ICD-10-CM

## 2020-06-03 LAB
ALBUMIN SERPL BCP-MCNC: 3.5 G/DL (ref 3.5–5.2)
ANION GAP SERPL CALC-SCNC: 10 MMOL/L (ref 8–16)
BASOPHILS # BLD AUTO: 0.05 K/UL (ref 0–0.2)
BASOPHILS NFR BLD: 1 % (ref 0–1.9)
BUN SERPL-MCNC: 29 MG/DL (ref 6–20)
CALCIUM SERPL-MCNC: 10.2 MG/DL (ref 8.7–10.5)
CHLORIDE SERPL-SCNC: 103 MMOL/L (ref 95–110)
CO2 SERPL-SCNC: 30 MMOL/L (ref 23–29)
CREAT SERPL-MCNC: 1.5 MG/DL (ref 0.5–1.4)
DIFFERENTIAL METHOD: ABNORMAL
EOSINOPHIL # BLD AUTO: 0.3 K/UL (ref 0–0.5)
EOSINOPHIL NFR BLD: 5.8 % (ref 0–8)
ERYTHROCYTE [DISTWIDTH] IN BLOOD BY AUTOMATED COUNT: 14.9 % (ref 11.5–14.5)
EST. GFR  (AFRICAN AMERICAN): 46.5 ML/MIN/1.73 M^2
EST. GFR  (NON AFRICAN AMERICAN): 40.3 ML/MIN/1.73 M^2
FERRITIN SERPL-MCNC: 142 NG/ML (ref 20–300)
GLUCOSE SERPL-MCNC: 113 MG/DL (ref 70–110)
HCT VFR BLD AUTO: 32.5 % (ref 37–48.5)
HGB BLD-MCNC: 9.2 G/DL (ref 12–16)
IMM GRANULOCYTES # BLD AUTO: 0.03 K/UL (ref 0–0.04)
IMM GRANULOCYTES NFR BLD AUTO: 0.6 % (ref 0–0.5)
IRON SERPL-MCNC: 38 UG/DL (ref 30–160)
LYMPHOCYTES # BLD AUTO: 0.6 K/UL (ref 1–4.8)
LYMPHOCYTES NFR BLD: 11 % (ref 18–48)
MCH RBC QN AUTO: 27 PG (ref 27–31)
MCHC RBC AUTO-ENTMCNC: 28.3 G/DL (ref 32–36)
MCV RBC AUTO: 95 FL (ref 82–98)
MONOCYTES # BLD AUTO: 0.7 K/UL (ref 0.3–1)
MONOCYTES NFR BLD: 12.7 % (ref 4–15)
NEUTROPHILS # BLD AUTO: 3.6 K/UL (ref 1.8–7.7)
NEUTROPHILS NFR BLD: 68.9 % (ref 38–73)
NRBC BLD-RTO: 0 /100 WBC
PHOSPHATE SERPL-MCNC: 4.1 MG/DL (ref 2.7–4.5)
PLATELET # BLD AUTO: 423 K/UL (ref 150–350)
PMV BLD AUTO: 9.5 FL (ref 9.2–12.9)
POTASSIUM SERPL-SCNC: 4.5 MMOL/L (ref 3.5–5.1)
RBC # BLD AUTO: 3.41 M/UL (ref 4–5.4)
SATURATED IRON: 13 % (ref 20–50)
SODIUM SERPL-SCNC: 143 MMOL/L (ref 136–145)
TOTAL IRON BINDING CAPACITY: 296 UG/DL (ref 250–450)
TRANSFERRIN SERPL-MCNC: 200 MG/DL (ref 200–375)
TRANSFERRIN SERPL-MCNC: 200 MG/DL (ref 200–375)
WBC # BLD AUTO: 5.2 K/UL (ref 3.9–12.7)

## 2020-06-03 PROCEDURE — 83540 ASSAY OF IRON: CPT

## 2020-06-03 PROCEDURE — 85025 COMPLETE CBC W/AUTO DIFF WBC: CPT

## 2020-06-03 PROCEDURE — 82728 ASSAY OF FERRITIN: CPT

## 2020-06-03 PROCEDURE — 36415 COLL VENOUS BLD VENIPUNCTURE: CPT

## 2020-06-03 PROCEDURE — 80069 RENAL FUNCTION PANEL: CPT

## 2020-06-08 DIAGNOSIS — Z94.1 STATUS POST HEART TRANSPLANT: ICD-10-CM

## 2020-06-08 DIAGNOSIS — D50.0 IRON DEFICIENCY ANEMIA DUE TO CHRONIC BLOOD LOSS: ICD-10-CM

## 2020-06-08 RX ORDER — FERROUS SULFATE 325(65) MG
325 TABLET, DELAYED RELEASE (ENTERIC COATED) ORAL
Qty: 90 TABLET | Refills: 3 | Status: SHIPPED | OUTPATIENT
Start: 2020-06-08

## 2020-06-09 ENCOUNTER — TELEPHONE (OUTPATIENT)
Dept: VASCULAR SURGERY | Facility: CLINIC | Age: 50
End: 2020-06-09

## 2020-06-09 ENCOUNTER — NURSE TRIAGE (OUTPATIENT)
Dept: ADMINISTRATIVE | Facility: CLINIC | Age: 50
End: 2020-06-09

## 2020-06-09 NOTE — TELEPHONE ENCOUNTER
Call disconnected ,     Reason for Disposition   Unable to complete triage due to phone connection issues    Protocols used: NO CONTACT OR DUPLICATE CONTACT CALL-A-AH

## 2020-06-10 NOTE — TELEPHONE ENCOUNTER
Attempted to contact pt on behalf of Post Procedural Symptom Tracker. No answer 2nd attempt. No follow up needed.    Reason for Disposition   Second attempt to contact family AND no contact made.  Phone number verified.    Protocols used: NO CONTACT OR DUPLICATE CONTACT CALL-A-AH

## 2020-06-12 ENCOUNTER — OFFICE VISIT (OUTPATIENT)
Dept: VASCULAR SURGERY | Facility: CLINIC | Age: 50
End: 2020-06-12
Attending: SURGERY
Payer: COMMERCIAL

## 2020-06-12 VITALS
DIASTOLIC BLOOD PRESSURE: 72 MMHG | SYSTOLIC BLOOD PRESSURE: 116 MMHG | BODY MASS INDEX: 31.49 KG/M2 | HEART RATE: 99 BPM | HEIGHT: 67 IN | TEMPERATURE: 99 F | WEIGHT: 200.63 LBS

## 2020-06-12 DIAGNOSIS — I89.8 LYMPHOCELE: Primary | ICD-10-CM

## 2020-06-12 PROCEDURE — 99024 PR POST-OP FOLLOW-UP VISIT: ICD-10-PCS | Mod: S$GLB,,, | Performed by: SURGERY

## 2020-06-12 PROCEDURE — 99024 POSTOP FOLLOW-UP VISIT: CPT | Mod: S$GLB,,, | Performed by: SURGERY

## 2020-06-12 PROCEDURE — 99999 PR PBB SHADOW E&M-EST. PATIENT-LVL IV: CPT | Mod: PBBFAC,,, | Performed by: SURGERY

## 2020-06-12 PROCEDURE — 99999 PR PBB SHADOW E&M-EST. PATIENT-LVL IV: ICD-10-PCS | Mod: PBBFAC,,, | Performed by: SURGERY

## 2020-06-12 NOTE — PROGRESS NOTES
VASCULAR SURGERY CLINIC NOTE    50yo F who returns after R groin lymphocele resection 5/28/20. She has done very well since surgery. Incision is healing appropriately. She has some tenderness at superior aspect of incision.  Pathology revealed benign fibroconnective tissue consistent with lymphocele.     Physical Exam:  Gen: NAD  Right groin incision c/d/i with sutures and staples in place    A/P:  49 y.o. Female doing well s/p R groin lymphocele resection   Staples removed   RTC in 6/22/20 for suture removal    NYDIA Bledsoe II, MD, [facility]  Vascular Surgeon  [facility]

## 2020-06-24 ENCOUNTER — LAB VISIT (OUTPATIENT)
Dept: LAB | Facility: HOSPITAL | Age: 50
End: 2020-06-24
Attending: INTERNAL MEDICINE
Payer: COMMERCIAL

## 2020-06-24 DIAGNOSIS — N28.9 RENAL INSUFFICIENCY: ICD-10-CM

## 2020-06-24 DIAGNOSIS — T86.298 OTHER COMPLICATION OF HEART TRANSPLANT: ICD-10-CM

## 2020-06-24 DIAGNOSIS — Z79.899 ENCOUNTER FOR LONG-TERM (CURRENT) USE OF MEDICATIONS: ICD-10-CM

## 2020-06-24 DIAGNOSIS — E78.2 MIXED HYPERLIPIDEMIA: ICD-10-CM

## 2020-06-24 DIAGNOSIS — Z94.1 STATUS POST HEART TRANSPLANT: ICD-10-CM

## 2020-06-24 LAB
ALBUMIN SERPL BCP-MCNC: 4.2 G/DL (ref 3.5–5.2)
ALP SERPL-CCNC: 61 U/L (ref 55–135)
ALT SERPL W/O P-5'-P-CCNC: 9 U/L (ref 10–44)
ANION GAP SERPL CALC-SCNC: 13 MMOL/L (ref 8–16)
AST SERPL-CCNC: 14 U/L (ref 10–40)
BASOPHILS # BLD AUTO: 0.04 K/UL (ref 0–0.2)
BASOPHILS NFR BLD: 0.6 % (ref 0–1.9)
BILIRUB SERPL-MCNC: 0.5 MG/DL (ref 0.1–1)
BUN SERPL-MCNC: 27 MG/DL (ref 6–20)
CALCIUM SERPL-MCNC: 9.8 MG/DL (ref 8.7–10.5)
CHLORIDE SERPL-SCNC: 104 MMOL/L (ref 95–110)
CHOLEST SERPL-MCNC: 148 MG/DL (ref 120–199)
CHOLEST/HDLC SERPL: 2.7 {RATIO} (ref 2–5)
CO2 SERPL-SCNC: 25 MMOL/L (ref 23–29)
CREAT SERPL-MCNC: 1.3 MG/DL (ref 0.5–1.4)
DIFFERENTIAL METHOD: ABNORMAL
EOSINOPHIL # BLD AUTO: 0.2 K/UL (ref 0–0.5)
EOSINOPHIL NFR BLD: 2.8 % (ref 0–8)
ERYTHROCYTE [DISTWIDTH] IN BLOOD BY AUTOMATED COUNT: 15.7 % (ref 11.5–14.5)
EST. GFR  (AFRICAN AMERICAN): 55.3 ML/MIN/1.73 M^2
EST. GFR  (NON AFRICAN AMERICAN): 48 ML/MIN/1.73 M^2
GLUCOSE SERPL-MCNC: 95 MG/DL (ref 70–110)
HCT VFR BLD AUTO: 36.6 % (ref 37–48.5)
HDLC SERPL-MCNC: 55 MG/DL (ref 40–75)
HDLC SERPL: 37.2 % (ref 20–50)
HGB BLD-MCNC: 10.3 G/DL (ref 12–16)
IMM GRANULOCYTES # BLD AUTO: 0.01 K/UL (ref 0–0.04)
IMM GRANULOCYTES NFR BLD AUTO: 0.2 % (ref 0–0.5)
LDLC SERPL CALC-MCNC: 72 MG/DL (ref 63–159)
LYMPHOCYTES # BLD AUTO: 0.5 K/UL (ref 1–4.8)
LYMPHOCYTES NFR BLD: 8.1 % (ref 18–48)
MAGNESIUM SERPL-MCNC: 1.7 MG/DL (ref 1.6–2.6)
MCH RBC QN AUTO: 26.3 PG (ref 27–31)
MCHC RBC AUTO-ENTMCNC: 28.1 G/DL (ref 32–36)
MCV RBC AUTO: 94 FL (ref 82–98)
MONOCYTES # BLD AUTO: 0.5 K/UL (ref 0.3–1)
MONOCYTES NFR BLD: 7 % (ref 4–15)
NEUTROPHILS # BLD AUTO: 5.2 K/UL (ref 1.8–7.7)
NEUTROPHILS NFR BLD: 81.3 % (ref 38–73)
NONHDLC SERPL-MCNC: 93 MG/DL
NRBC BLD-RTO: 0 /100 WBC
PLATELET # BLD AUTO: 367 K/UL (ref 150–350)
PMV BLD AUTO: 9.8 FL (ref 9.2–12.9)
POTASSIUM SERPL-SCNC: 3.4 MMOL/L (ref 3.5–5.1)
PROT SERPL-MCNC: 7 G/DL (ref 6–8.4)
RBC # BLD AUTO: 3.91 M/UL (ref 4–5.4)
SODIUM SERPL-SCNC: 142 MMOL/L (ref 136–145)
TRIGL SERPL-MCNC: 105 MG/DL (ref 30–150)
WBC # BLD AUTO: 6.39 K/UL (ref 3.9–12.7)

## 2020-06-24 PROCEDURE — 86832 HLA CLASS I HIGH DEFIN QUAL: CPT

## 2020-06-24 PROCEDURE — 86832 HLA CLASS I HIGH DEFIN QUAL: CPT | Mod: 59

## 2020-06-24 PROCEDURE — 80061 LIPID PANEL: CPT

## 2020-06-24 PROCEDURE — 36415 COLL VENOUS BLD VENIPUNCTURE: CPT

## 2020-06-24 PROCEDURE — 83735 ASSAY OF MAGNESIUM: CPT

## 2020-06-24 PROCEDURE — 86833 HLA CLASS II HIGH DEFIN QUAL: CPT

## 2020-06-24 PROCEDURE — 80197 ASSAY OF TACROLIMUS: CPT

## 2020-06-24 PROCEDURE — 85025 COMPLETE CBC W/AUTO DIFF WBC: CPT

## 2020-06-24 PROCEDURE — 86833 HLA CLASS II HIGH DEFIN QUAL: CPT | Mod: 59

## 2020-06-24 PROCEDURE — 86977 RBC SERUM PRETX INCUBJ/INHIB: CPT

## 2020-06-24 PROCEDURE — 80053 COMPREHEN METABOLIC PANEL: CPT

## 2020-06-25 ENCOUNTER — TELEPHONE (OUTPATIENT)
Dept: TRANSPLANT | Facility: CLINIC | Age: 50
End: 2020-06-25

## 2020-06-25 ENCOUNTER — LAB VISIT (OUTPATIENT)
Dept: LAB | Facility: HOSPITAL | Age: 50
End: 2020-06-25
Attending: INTERNAL MEDICINE
Payer: COMMERCIAL

## 2020-06-25 ENCOUNTER — OFFICE VISIT (OUTPATIENT)
Dept: TRANSPLANT | Facility: CLINIC | Age: 50
End: 2020-06-25
Payer: COMMERCIAL

## 2020-06-25 ENCOUNTER — HOSPITAL ENCOUNTER (OUTPATIENT)
Dept: CARDIOLOGY | Facility: HOSPITAL | Age: 50
Discharge: HOME | End: 2020-06-25
Attending: INTERNAL MEDICINE
Payer: COMMERCIAL

## 2020-06-25 VITALS
WEIGHT: 200 LBS | BODY MASS INDEX: 31.39 KG/M2 | DIASTOLIC BLOOD PRESSURE: 78 MMHG | SYSTOLIC BLOOD PRESSURE: 130 MMHG | HEIGHT: 67 IN | HEART RATE: 114 BPM

## 2020-06-25 VITALS
SYSTOLIC BLOOD PRESSURE: 129 MMHG | BODY MASS INDEX: 31.87 KG/M2 | HEART RATE: 103 BPM | WEIGHT: 203.06 LBS | DIASTOLIC BLOOD PRESSURE: 77 MMHG | HEIGHT: 67 IN

## 2020-06-25 DIAGNOSIS — Z94.1 STATUS POST HEART TRANSPLANT: Primary | ICD-10-CM

## 2020-06-25 DIAGNOSIS — I42.5 RESTRICTIVE CARDIOMYOPATHY: Primary | ICD-10-CM

## 2020-06-25 DIAGNOSIS — D84.9 IMMUNOSUPPRESSION: ICD-10-CM

## 2020-06-25 DIAGNOSIS — N18.2 STAGE 2 CHRONIC KIDNEY DISEASE: ICD-10-CM

## 2020-06-25 DIAGNOSIS — Z94.1 STATUS POST HEART TRANSPLANT: ICD-10-CM

## 2020-06-25 DIAGNOSIS — Z94.1 HEART TRANSPLANTED: ICD-10-CM

## 2020-06-25 DIAGNOSIS — D86.85 CARDIAC SARCOIDOSIS: ICD-10-CM

## 2020-06-25 DIAGNOSIS — Z94.1 HEART REPLACED BY TRANSPLANT: ICD-10-CM

## 2020-06-25 DIAGNOSIS — Z79.899 ENCOUNTER FOR LONG-TERM (CURRENT) USE OF MEDICATIONS: ICD-10-CM

## 2020-06-25 DIAGNOSIS — T38.0X5S ADVERSE EFFECT OF ADRENAL CORTICAL STEROIDS, SEQUELA: ICD-10-CM

## 2020-06-25 DIAGNOSIS — T86.298 OTHER COMPLICATION OF HEART TRANSPLANT: ICD-10-CM

## 2020-06-25 LAB
ASCENDING AORTA: 2.6 CM
AV INDEX (PROSTH): 0.67
AV MEAN GRADIENT: 6 MMHG
AV PEAK GRADIENT: 12 MMHG
AV VALVE AREA: 2.21 CM2
AV VELOCITY RATIO: 0.72
BSA FOR ECHO PROCEDURE: 2.07 M2
CLASS I ANTIBODY COMMENTS - LUMINEX: NORMAL
CLASS II ANTIBODY COMMENTS - LUMINEX: NORMAL
CV ECHO LV RWT: 0.38 CM
DOP CALC AO PEAK VEL: 1.7 M/S
DOP CALC AO VTI: 32.3 CM
DOP CALC LVOT AREA: 3.3 CM2
DOP CALC LVOT DIAMETER: 2.05 CM
DOP CALC LVOT PEAK VEL: 1.22 M/S
DOP CALC LVOT STROKE VOLUME: 71.52 CM3
DOP CALCLVOT PEAK VEL VTI: 21.68 CM
DSA1 TESTING DATE: NORMAL
DSA12 TESTING DATE: NORMAL
DSA2 TESTING DATE: NORMAL
E WAVE DECELERATION TIME: 157.8 MSEC
E/A RATIO: 1.05
E/E' RATIO: 9.33 M/S
ECHO LV POSTERIOR WALL: 0.83 CM (ref 0.6–1.1)
FRACTIONAL SHORTENING: 35 % (ref 28–44)
INTERVENTRICULAR SEPTUM: 0.86 CM (ref 0.6–1.1)
LEFT INTERNAL DIMENSION IN SYSTOLE: 2.89 CM (ref 2.1–4)
LEFT VENTRICLE DIASTOLIC VOLUME INDEX: 43.76 ML/M2
LEFT VENTRICLE DIASTOLIC VOLUME: 88.48 ML
LEFT VENTRICLE MASS INDEX: 59 G/M2
LEFT VENTRICLE SYSTOLIC VOLUME INDEX: 15.7 ML/M2
LEFT VENTRICLE SYSTOLIC VOLUME: 31.83 ML
LEFT VENTRICULAR INTERNAL DIMENSION IN DIASTOLE: 4.42 CM (ref 3.5–6)
LEFT VENTRICULAR MASS: 118.53 G
LV LATERAL E/E' RATIO: 9.8 M/S
LV SEPTAL E/E' RATIO: 8.91 M/S
MV PEAK A VEL: 0.93 M/S
MV PEAK E VEL: 0.98 M/S
MV STENOSIS PRESSURE HALF TIME: 45.76 MS
MV VALVE AREA P 1/2 METHOD: 4.81 CM2
PISA TR MAX VEL: 2.81 M/S
RA PRESSURE: 3 MMHG
RIGHT VENTRICULAR END-DIASTOLIC DIMENSION: 2.99 CM
SERUM COLLECTION DT - LUMINEX CLASS I: NORMAL
SERUM COLLECTION DT - LUMINEX CLASS II: NORMAL
SINUS: 2.71 CM
STJ: 2.3 CM
TACROLIMUS BLD-MCNC: 6.6 NG/ML (ref 5–15)
TDI LATERAL: 0.1 M/S
TDI SEPTAL: 0.11 M/S
TDI: 0.11 M/S
TR MAX PG: 32 MMHG
TRICUSPID ANNULAR PLANE SYSTOLIC EXCURSION: 0.86 CM
TV REST PULMONARY ARTERY PRESSURE: 35 MMHG

## 2020-06-25 PROCEDURE — 3078F DIAST BP <80 MM HG: CPT | Mod: CPTII,S$GLB,, | Performed by: INTERNAL MEDICINE

## 2020-06-25 PROCEDURE — 99999 PR PBB SHADOW E&M-EST. PATIENT-LVL IV: CPT | Mod: PBBFAC,,, | Performed by: INTERNAL MEDICINE

## 2020-06-25 PROCEDURE — 3074F SYST BP LT 130 MM HG: CPT | Mod: CPTII,S$GLB,, | Performed by: INTERNAL MEDICINE

## 2020-06-25 PROCEDURE — 99215 PR OFFICE/OUTPT VISIT, EST, LEVL V, 40-54 MIN: ICD-10-PCS | Mod: S$GLB,,, | Performed by: INTERNAL MEDICINE

## 2020-06-25 PROCEDURE — 36415 COLL VENOUS BLD VENIPUNCTURE: CPT

## 2020-06-25 PROCEDURE — 93306 ECHO (CUPID ONLY): ICD-10-PCS | Mod: 26,,, | Performed by: INTERNAL MEDICINE

## 2020-06-25 PROCEDURE — 93306 TTE W/DOPPLER COMPLETE: CPT | Mod: 26,,, | Performed by: INTERNAL MEDICINE

## 2020-06-25 PROCEDURE — 3008F PR BODY MASS INDEX (BMI) DOCUMENTED: ICD-10-PCS | Mod: CPTII,S$GLB,, | Performed by: INTERNAL MEDICINE

## 2020-06-25 PROCEDURE — 93306 TTE W/DOPPLER COMPLETE: CPT

## 2020-06-25 PROCEDURE — 3008F BODY MASS INDEX DOCD: CPT | Mod: CPTII,S$GLB,, | Performed by: INTERNAL MEDICINE

## 2020-06-25 PROCEDURE — 3074F PR MOST RECENT SYSTOLIC BLOOD PRESSURE < 130 MM HG: ICD-10-PCS | Mod: CPTII,S$GLB,, | Performed by: INTERNAL MEDICINE

## 2020-06-25 PROCEDURE — 99215 OFFICE O/P EST HI 40 MIN: CPT | Mod: S$GLB,,, | Performed by: INTERNAL MEDICINE

## 2020-06-25 PROCEDURE — 99999 PR PBB SHADOW E&M-EST. PATIENT-LVL IV: ICD-10-PCS | Mod: PBBFAC,,, | Performed by: INTERNAL MEDICINE

## 2020-06-25 PROCEDURE — 3078F PR MOST RECENT DIASTOLIC BLOOD PRESSURE < 80 MM HG: ICD-10-PCS | Mod: CPTII,S$GLB,, | Performed by: INTERNAL MEDICINE

## 2020-06-25 RX ORDER — AMOXICILLIN 500 MG/1
2000 CAPSULE ORAL
Qty: 4 CAPSULE | Refills: 3 | Status: SHIPPED | OUTPATIENT
Start: 2020-06-25 | End: 2020-11-25

## 2020-06-25 NOTE — LETTER
June 25, 2020        Joaquin Del Toro  [address]  [address]  [facility] [address]  Phone: [phone]  Fax: [phone]             [facility] [address]  [place] [address]  Phone: [phone]   Patient: Deborah Navas   MR Number: [id number]   YOB: 1970   Date of Visit: 6/25/2020       Dear Dr. Joaquin Del Toro    Thank you for referring Deborah Navas to me for evaluation. Attached you will find relevant portions of my assessment and plan of care.    If you have questions, please do not hesitate to call me. I look forward to following Deborah Navas along with you.    Sincerely,    Johny Zarate MD    Enclosure    If you would like to receive this communication electronically, please contact [email or web address] or [phone] to request [employer] access.    [employer] is a tool which provides read-only access to select patient information with whom you have a relationship. Its easy to use and provides real time access to review your patients record including encounter summaries, notes, results, and demographic information.    If you feel you have received this communication in error or would no longer like to receive these types of communications, please e-mail [email or web address]

## 2020-06-25 NOTE — PROGRESS NOTES
Met with pt in clinic. Doing well. Pt is losing hair. Discussed with pt the hair loss most likely from transplant medication. Informed pt she may take biotin daily for hair loss. Pt also stated she wanted her cat and asked about pets. Advised if she gets her cat back, someone else should clean the litter box or she will need to wear a mask and gloves when cleaning litter box. Discussed the risks of cleaning cat litter. Pt needs a certificate for disability, gave her the disability office number to contact. Pt wants to make a dental appt to have her teeth cleaned. Provided pt with amoxicillin prescription with instructions to take one hour prior to cleaning Gave her our fax number for dentist to fax a clearance if needed. Looked at her groin, it looks great. No swelling, scar healing. Pt requested to go to cardiac rehab, if safe to do so. She would like to go to [facility] or [facility], if not [facility]. Pt is requesting for OT for her hands. She is having stiffness with her hands, has had since transplant. She stated OT helped loosen her hands up and would like to try again. Pt is still experiencing tremors. She has a hard time writing and asked how to rid tremors. Discussed the tremors are related to transplant medication. Will discuss in chart review if she can switch to Envarsus or Everolimus (per Elliot).

## 2020-06-25 NOTE — TELEPHONE ENCOUNTER
Reviewed chart with [facility]    Changed goal to 5-10 for tacrolimus. Low risk rejection, High risk CMV     Meds:  Tac 2/3  /500  Pred 5 mg  Dapsone 100 mg    Labs:  WBC 6.39  Cr 1.3  Tac 6.6    ECHO 65%    Reviewed pt tremors, discussed switch to Everolimus. Will need a new pr/cr ratio and CMV IGG. Will hold off on increasing MMF.    Waiting for allomap and allosure to result.

## 2020-06-25 NOTE — PROGRESS NOTES
Subjective:   Ms. Navas is a 49 y.o. year old White female who received a donation after brain death heart transplant on 12/16/19.      Rejection status: Low      High Risk Donor: Yes ([facility] lab drawn 1/25/20 Neg results)  CMV status:   Donor: +   Recipient: negative  (CMV IGG nonreactive 2/17)    HPI s/p OHTx on 12/16/2019 (incidentially diagnosed with cardiac sacroid at time of explant) ,s/p BTT HM3 on 9/2019 HLD, obesity, and OA who comes in to prepare for lymphocele removal.  She was noted to have mild leukopenia (ANC 1250) She is otherwise dosing well with no signs of recurrence of her CMV five months post transplant.     Immuno - prednisone 5 daily / cellcept 500 BID  / tacro 2/3 (goal 7 to 12)     Only issue is tremor.     May 18 2020  · Concentric left ventricular remodeling.  · Left ventricular systolic function. The estimated ejection fraction is 60%.  · Normal LV diastolic function.  · Normal right ventricular systolic function.  · Mild tricuspid regurgitation.  · Mild mitral regurgitation.      Review of Systems   Constitution: Negative for decreased appetite, weight gain and weight loss.   Cardiovascular: Negative for chest pain, dyspnea on exertion, leg swelling, near-syncope, orthopnea and palpitations.   Respiratory: Negative for cough and shortness of breath.    Musculoskeletal: Negative for myalgias.   Gastrointestinal: Negative for jaundice.       Objective:     Physical Exam   Constitutional: She appears well-developed and well-nourished. No distress.        HENT:   Head: Normocephalic and atraumatic. Head is without abrasion and without contusion.   Right Ear: External ear normal.   Left Ear: External ear normal.   Nose: Nose normal. No epistaxis.   Mouth/Throat: Oropharynx is clear and moist. Mucous membranes are not cyanotic.   Eyes: Pupils are equal, round, and reactive to light. Conjunctivae and EOM are normal.   Neck: Normal range of motion. Neck supple. No tracheal deviation present.    Cardiovascular: Normal rate, regular rhythm, normal heart sounds and normal pulses. Exam reveals no gallop.   No murmur heard.  Pulmonary/Chest: Effort normal and breath sounds normal. No stridor. No respiratory distress. She has no wheezes.   Abdominal: Soft. Normal appearance, normal aorta and bowel sounds are normal. She exhibits no distension. There is no abdominal tenderness.   Musculoskeletal:         General: No tenderness or edema.      Comments: Large right lymphocele   Neurological: She is alert. She has normal strength and normal reflexes. She exhibits normal muscle tone.   Skin: Skin is warm. No rash noted. No erythema.   Psychiatric: She has a normal mood and affect. Her speech is normal and behavior is normal. Judgment and thought content normal. Cognition and memory are normal.       Lab Results   Component Value Date    WBC 6.39 06/24/2020    HGB 10.3 (L) 06/24/2020    HCT 36.6 (L) 06/24/2020    MCV 94 06/24/2020     (H) 06/24/2020    CO2 25 06/24/2020    CREATININE 1.3 06/24/2020    CALCIUM 9.8 06/24/2020    ALBUMIN 4.2 06/24/2020    AST 14 06/24/2020    BNP 48 05/18/2020    ALT 9 (L) 06/24/2020       Lab Results   Component Value Date    INR 1.2 12/23/2019    INR 1.4 (H) 12/22/2019    INR 1.3 (H) 12/21/2019       BNP   Date Value Ref Range Status   05/18/2020 48 0 - 99 pg/mL Final     Comment:     Values of less than 100 pg/ml are consistent with non-CHF populations.   03/10/2020 157 (H) 0 - 99 pg/mL Final     Comment:     Values of less than 100 pg/ml are consistent with non-CHF populations.   02/24/2020 278 (H) 0 - 99 pg/mL Final     Comment:     Values of less than 100 pg/ml are consistent with non-CHF populations.       LD   Date Value Ref Range Status   12/16/2019 240 110 - 260 U/L Final     Comment:     Results are increased in hemolyzed samples.   12/15/2019 246 110 - 260 U/L Final     Comment:     Results are increased in hemolyzed samples.   12/14/2019 257 110 - 260 U/L Final      Comment:     Results are increased in hemolyzed samples.       Tacrolimus Lvl   Date Value Ref Range Status   05/18/2020 6.9 5.0 - 15.0 ng/mL Final     Comment:     Testing performed by Liquid Chromatography-Tandem  Mass Spectrometry (LC-MS/MS).  This test was developed and its performance characteristics  determined by [facility], Department of Pathology  and Laboratory Medicine in a manner consistent with CLIA  requirements. It has not been cleared or approved by the [place]  Food and Drug Administration.  This test is used for clinical   purposes.  It should not be regarded as investigational or for  research.         Assessment:     1. Restrictive cardiomyopathy post heart transplant    2. Heart transplanted    3. Stage 2 chronic kidney disease    4. Immunosuppression    5. 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 During the second year, return to clinic every 3 months. Post transplant year 3-5 return every 6 months. There will be a comprehensive post transplant evaluation every year that may include LHC/RHC/biopsy, stress test, echo, CXR, and other health screening exams.    In addition to the clinical assessment, I have ordered Allomap testing for this patient to assist in identification of moderate/severe  acute cellular rejection (ACR) in a pt with stable Allograft function instead of endomyocardial biopsy.     Patient is reminded to call with any health changes as these can be early signs of transplant complications. Patient is advised to make sure any new medications or changes of old medications are discussed with a pharmacist or physician knowledgeable with transplant to avoid rejection/drug toxicity related to significant drug interactions.    UNOS Patient Status  Functional Status: 70% - Cares for self: unable to carry on normal activity or active work  Physical Capacity: No Limitations  Working for Income: No  If no, reason not working: Demands of Treatment    Johny Zarate MD

## 2020-06-29 DIAGNOSIS — R25.1 TREMORS OF NERVOUS SYSTEM: ICD-10-CM

## 2020-06-29 DIAGNOSIS — Z94.1 HEART REPLACED BY TRANSPLANT: Primary | ICD-10-CM

## 2020-06-30 ENCOUNTER — TELEPHONE (OUTPATIENT)
Dept: TRANSPLANT | Facility: CLINIC | Age: 50
End: 2020-06-30

## 2020-06-30 LAB — HEART CARE KIT (SHORE): NORMAL

## 2020-06-30 NOTE — TELEPHONE ENCOUNTER
Faxed chart records to [facility] at [facility] in [place] [facility]. Pt will start in mid July.

## 2020-06-30 NOTE — TELEPHONE ENCOUNTER
----- Message from Miriam Prieto MD sent at 6/30/2020  8:44 AM CDT -----  Increase by 1mg and repeat in 5-7 days.  ----- Message -----  From: Steffany Cummings RN  Sent: 6/25/2020  12:20 PM CDT  To: Miriam Prieto MD    Pt is 6 months post, level 6.6, goal is 8-12. Cr 1.3. Pt is on tac 2/3.

## 2020-06-30 NOTE — TELEPHONE ENCOUNTER
Discussed with Dr. Prieto the following and the plan:  1. Tac level 6.6. Will repeat tomorrow before increasing the dose as she has terrible tremors and a tendency for higher creatinine.   2. Possibility of Everolimus - will see Dr. Prieto in July and can discuss. Getting Urine for pro-cr ration tomorrow.   3. Anemia is bettter.  4. Have sent a referral for Cardiac Rehab and spoke to nurse who will call patient. They are not having the Rehab until mid-July  5. Have sent a referral for OT for her hands   6. Will send letter for her to resume work.

## 2020-07-01 ENCOUNTER — LAB VISIT (OUTPATIENT)
Dept: LAB | Facility: HOSPITAL | Age: 50
End: 2020-07-01
Payer: COMMERCIAL

## 2020-07-01 DIAGNOSIS — Z94.1 STATUS POST HEART TRANSPLANT: ICD-10-CM

## 2020-07-01 DIAGNOSIS — Z94.1 HEART REPLACED BY TRANSPLANT: ICD-10-CM

## 2020-07-01 DIAGNOSIS — Z94.1 HEART REPLACED BY TRANSPLANT: Primary | ICD-10-CM

## 2020-07-01 LAB
C1Q1 TESTING DATE: NORMAL
C1Q2 TESTING DATE: NORMAL
CLASS I ANTIBODY COMMENTS - LUMINEX: NORMAL
CLASS II ANTIBODY COMMENTS - LUMINEX: NORMAL
HC1Q TESTING DATE: NORMAL
SERUM COLLECTION DT - LUMINEX CLASS I: NORMAL
SERUM COLLECTION DT - LUMINEX CLASS II: NORMAL

## 2020-07-01 PROCEDURE — 84156 ASSAY OF PROTEIN URINE: CPT

## 2020-07-01 RX ORDER — TACROLIMUS 1 MG/1
3 CAPSULE ORAL EVERY MORNING
Qty: 180 CAPSULE | Refills: 11 | Status: SHIPPED | OUTPATIENT
Start: 2020-07-01 | End: 2020-07-01 | Stop reason: SDUPTHER

## 2020-07-01 RX ORDER — TACROLIMUS 1 MG/1
3 CAPSULE ORAL EVERY MORNING
Qty: 180 CAPSULE | Refills: 11 | Status: SHIPPED | OUTPATIENT
Start: 2020-07-01 | End: 2020-07-02

## 2020-07-01 NOTE — TELEPHONE ENCOUNTER
----- Message from Steffany Cummings, RN sent at 6/25/2020  2:34 PM CDT -----  Regarding: cmv igg, pr/cr ratio  Checking CMV IGG and pr/Cr ratio for possible switch to everolimus

## 2020-07-02 LAB
CREAT UR-MCNC: 154 MG/DL (ref 15–325)
PROT UR-MCNC: <7 MG/DL (ref 0–15)
PROT/CREAT UR: NORMAL MG/G{CREAT} (ref 0–0.2)

## 2020-07-02 RX ORDER — TACROLIMUS 1 MG/1
3 CAPSULE ORAL EVERY 12 HOURS
Qty: 180 CAPSULE | Refills: 11 | Status: SHIPPED | OUTPATIENT
Start: 2020-07-02 | End: 2021-05-24

## 2020-07-02 NOTE — TELEPHONE ENCOUNTER
----- Message from James Barrera sent at 7/2/2020 11:11 AM CDT -----  Regarding: MRN: [id number] PT [facility]  A new script was sent to pharmacy on yesterday for TACRO 1mg dose 3am. Please verify evening dose and send new script. Thanks.

## 2020-07-06 ENCOUNTER — TELEPHONE (OUTPATIENT)
Dept: TRANSPLANT | Facility: CLINIC | Age: 50
End: 2020-07-06

## 2020-07-09 ENCOUNTER — LAB VISIT (OUTPATIENT)
Dept: LAB | Facility: HOSPITAL | Age: 50
End: 2020-07-09
Attending: INTERNAL MEDICINE
Payer: COMMERCIAL

## 2020-07-09 DIAGNOSIS — Z94.1 STATUS POST HEART TRANSPLANT: ICD-10-CM

## 2020-07-09 DIAGNOSIS — T86.298 OTHER COMPLICATION OF HEART TRANSPLANT: ICD-10-CM

## 2020-07-09 DIAGNOSIS — Z79.899 ENCOUNTER FOR LONG-TERM (CURRENT) USE OF MEDICATIONS: ICD-10-CM

## 2020-07-09 DIAGNOSIS — N28.9 RENAL INSUFFICIENCY: ICD-10-CM

## 2020-07-09 LAB
ALBUMIN SERPL BCP-MCNC: 4.4 G/DL (ref 3.5–5.2)
ALP SERPL-CCNC: 63 U/L (ref 55–135)
ALT SERPL W/O P-5'-P-CCNC: 11 U/L (ref 10–44)
ANION GAP SERPL CALC-SCNC: 12 MMOL/L (ref 8–16)
AST SERPL-CCNC: 16 U/L (ref 10–40)
BILIRUB SERPL-MCNC: 0.7 MG/DL (ref 0.1–1)
BUN SERPL-MCNC: 31 MG/DL (ref 6–20)
CALCIUM SERPL-MCNC: 10.1 MG/DL (ref 8.7–10.5)
CHLORIDE SERPL-SCNC: 102 MMOL/L (ref 95–110)
CO2 SERPL-SCNC: 29 MMOL/L (ref 23–29)
CREAT SERPL-MCNC: 1.5 MG/DL (ref 0.5–1.4)
EST. GFR  (AFRICAN AMERICAN): 46.5 ML/MIN/1.73 M^2
EST. GFR  (NON AFRICAN AMERICAN): 40.3 ML/MIN/1.73 M^2
GLUCOSE SERPL-MCNC: 107 MG/DL (ref 70–110)
POTASSIUM SERPL-SCNC: 4 MMOL/L (ref 3.5–5.1)
PROT SERPL-MCNC: 7.1 G/DL (ref 6–8.4)
SODIUM SERPL-SCNC: 143 MMOL/L (ref 136–145)

## 2020-07-09 PROCEDURE — 36415 COLL VENOUS BLD VENIPUNCTURE: CPT

## 2020-07-09 PROCEDURE — 80197 ASSAY OF TACROLIMUS: CPT

## 2020-07-09 PROCEDURE — 80053 COMPREHEN METABOLIC PANEL: CPT

## 2020-07-10 ENCOUNTER — TELEPHONE (OUTPATIENT)
Dept: TRANSPLANT | Facility: CLINIC | Age: 50
End: 2020-07-10

## 2020-07-10 LAB — TACROLIMUS BLD-MCNC: 7.1 NG/ML (ref 5–15)

## 2020-07-13 ENCOUNTER — CLINICAL SUPPORT (OUTPATIENT)
Dept: REHABILITATION | Facility: HOSPITAL | Age: 50
End: 2020-07-13
Payer: COMMERCIAL

## 2020-07-13 DIAGNOSIS — R29.898 POOR FINE MOTOR SKILLS: Primary | ICD-10-CM

## 2020-07-13 DIAGNOSIS — R25.1 TREMOR: ICD-10-CM

## 2020-07-13 DIAGNOSIS — M62.542 THENAR MUSCLE ATROPHY OF LEFT HAND: ICD-10-CM

## 2020-07-13 DIAGNOSIS — M62.541 THENAR MUSCLE ATROPHY OF RIGHT HAND: ICD-10-CM

## 2020-07-13 PROCEDURE — 97165 OT EVAL LOW COMPLEX 30 MIN: CPT

## 2020-07-13 PROCEDURE — 97535 SELF CARE MNGMENT TRAINING: CPT

## 2020-07-13 NOTE — PATIENT INSTRUCTIONS
Obtain a thumb-support splint.  Purchase a button hook to assist with buttons and zippers.  Use home putty and practice squeezing between thumbs and small fingers.

## 2020-07-13 NOTE — PLAN OF CARE
"  Initial OT evaluation completed this date. Please see initial POC in treatment tab.     [facility] Occupational Therapy  Initial Evaluation     Date: 7/13/2020  Patient: Deborah Navas  Chart Number: [id number]    Therapy Diagnosis:   Physician: Miriam Prieto MD    Physician Orders: OT eval and treat  Medical Diagnosis: tremors of the nervous system  Evaluation Date: 7/13/2020  Insurance Authorization period Expiration: 9/2/2020  Plan of Care Expiration Period: 9/2/2020    Visit # / Visits Authortized: 1 / 13  Time In:1510  Time Out: 1600  Total Billable Time: 50 minutes    Precautions: Standard and Standard, Immunosuppression and organ transplant  Date of Onset: 12/16/20 Surgery: heart transplant    Subjective     History of Current Condition:  Pt underwent placement of LVAD on 9/10/2019 and then underwent heart transplant surgery on 12/16/2019. She was hospitalized for two weeks and then D/C to [facility] received home for  PT, but then readmitted to hospital after 2 weeks. Upon D/C, she received  PT and OT, then re-admitted for one more week again and DC on 1/28/2020. Since the surgery, she has experienced an overall reduction in strength and endurance, as well as a lose of 40lbs.    She started participating in outpatient OT on January 31, 2020, but due to COVID-19, she had to stop attending therapy in March 2020. However, pt is now re-starting OT services as she felt she was making good progress prior to COVID onset. She has also returned to work (working from home 25 hours/week) and reports difficulty with typing.    Involved Side: bilateral  Dominant Side: Right  Date of Onset: 12/16/2020  Surgical Procedure: heart transplant  Imaging: X rays: None  Previous Therapy: HH PT & OT, outpatient OT    Patient's Goals for Therapy: "I want to be able to type again so I can keep my job, and I want to be able to cup my hands again to drink water and wash my " "face."    Pain:  Functional Pain Scale Rating 0-10:   n/a/10 on average  n/a/10 at best  n/a/10 at worst  Location: n/a  Description: n/a  Aggravating Factors: n/a  Easing Factors: n/a    Occupation:    Pt has just returned to work this week (from disability). She is working from home 25 hours/week.  Working presently: employed at [employer] as a   Duties: typing    Functional Limitations/Social History:    Previous functional status includes: Independent with all ADLs.     Current FunctionalStatus   Home/Living environment : lives with a friend      Limitation of Functional Status as follows:   ADLs/IADLs: Difficulty with doing dishes and handwriting    - Feeding: Difficulty with cutting foods; uses weighted utensils    - Bathing: Difficulty washing back    - Dressing/Grooming: Difficulty with buttons and zippers    - Driving: Difficulty with turning key in ignition     Leisure: Crafts      Past Medical History/Physical Systems Review:   Deborah Navas  has a past medical history of Fractures, History of ventricular fibrillation, History of ventricular tachycardia, Hyperlipidemia, Migraine, Multinodular goiter, Osteoarthritis, and Restrictive cardiomyopathy post heart transplant.    Deborah Navas  has a past surgical history that includes Tympanostomy tube placement (1971- 1979); Tonsillectomy; eardrum reconstruction (1980); Temporomandibular joint surgery (Right, 1988); Sinus surgery (Right, 1994); Forearm fracture surgery (Bilateral, 0620-8202); Elbow surgery (Left, 3203-2315); Lumbar fusion (2007); Back surgery; Bone graft (Left); Insertion of pacemaker (Left); Insertion of implantable cardioverter-defibrillator (ICD) generator with two existing leads; Right heart catheterization (Right, 9/4/2019); Left ventricular assist device (N/A, 9/10/2019); Insertion of graft to pericardium (9/10/2019); Sternal wound closure (9/10/2019); Treatment of cardiac arrhythmia (N/A, 9/24/2019); " "Right heart catheterization (N/A, 11/18/2019); Heart transplant (N/A, 12/16/2019); Biopsy with ultrasound guidance (N/A, 12/31/2019); Right heart catheterization (Right, 12/31/2019); Biopsy with ultrasound guidance (N/A, 1/7/2020); Right heart catheterization (Right, 1/7/2020); Biopsy with ultrasound guidance (N/A, 1/14/2020); Biopsy with ultrasound guidance (N/A, 1/28/2020); Biopsy with ultrasound guidance (N/A, 2/11/2020); Biopsy with ultrasound guidance (N/A, 3/10/2020); and Wound exploration (Right, 5/27/2020).    Deborah has a current medication list which includes the following prescription(s): amlodipine, amoxicillin, aspirin, atorvastatin, calcium carbonate-vitamin d3, dapsone, ferrous sulfate, gabapentin, magnesium oxide, mycophenolate, opw transplant care kit, pantoprazole, prednisone, senna-docusate 8.6-50 mg, sitagliptin, tacrolimus, venlafaxine, and zolpidem.    Review of patient's allergies indicates:   Allergen Reactions    Adhesive Blisters     "Reaction to chest only up to neck. Denies any problems w/adhesive on any other areas of the body.    Codeine Itching          Objective     Observation/Inspection:  Atrophy of bilateral thenar eminences; poor stability of bilateral thumb CMC and MCP joints    Sensation: Intact to light touch. Hypersensitivity reported in bilateral small digits. Paresthesias reported in bilateral index and ring fingers.     Range of Motion:  BUE ROM is WNL    Manual Muscle Test:  BUE is 5/5, with exception of L wrist flex/ext: 4/5 and B thumb opposition: 3/5                                       and Pinch Strength:     and Pinch Strength (in pounds, psi's):   Left Right    II 49 59   Lateral 1 4   Tripod 4 3.5   Tip 0.5 0.5     Pt's biggest complaints were B hand tremors (which she is aware that the tremors are related to transplant medication), B hand stiffness, and B digit weakness, which limits her ability to type on her keyboard.      CMS " Impairment/Limitation/Restriction for Quick DASH Survey    Therapist reviewed Quick DASH scores for Deborah Navas on 7/13/2020.   Quick DASH documents entered into [employer] - see Media section.    The Quick DASH Questionnaire- The following scores are based on patient reported assessment at the time of the initial occupational therapy evaluation:    Activity:  1. Open a tight jar: Severe Difficulty  2. Do heavy household chores: Mild Difficulty  3. Carry a shopping bag or briefcase: Moderate Difficulty  4. Wash your back: Moderate Difficulty  5.Use a knife to cut food:Moderate Difficulty  6.. Recreational activities requiring force through arm: Mild Difficulty    7. Social Limitation: Not Limited  8. Work/ADL Limitation: Very Limited    Severity of Symptoms (over the past week)  9. Pain: None  10. Tingling: Mild    11. Sleeping Limitation: No Difficulty    Limitation Score: 34.1%         Treatment     Treatment Time In: 1545  Treatment Time Out: 1600  Total Treatment time separate from Evaluation time:15    Deborah received therapeutic exercises for 5 minutes including:  -review of home putty use and thumb opposition to small finger    Deborah received education in self-care activities to improve functional performance for 10  minutes, including:  -use of button hook to improve independence, and use of a thumb/wrist splint to protect and stabilize joints during functional tasks    Home Exercise Program/Education:  Issued HEP (see patient instructions in EMR). Exercises were reviewed and Deborah was able to demonstrate them prior to the end of the session.   Pt received a written copy of exercises to perform at home. Deborah demonstrated good  understanding of the education provided.  Pt was advised to perform these exercises free of pain, and to stop performing them if pain occurs.    Patient/Family Education: role of OT, goals for OT, scheduling/cancellations - pt verbalized understanding. Discussed  insurance limitations with patient.    Additional Education provided: use of a thumb splint to protect joint    Assessment     Deborah Navas is a 49 y.o. female referred to outpatient occupational/hand therapy and presents with a medical diagnosis of tremors of the nervous system, resulting in paresthesias, decreased strength, and decreased functional use of bilateral hands/digits, and demonstrates limitations as described in the chart below.     The patient's rehab potential is Fair.     Anticipated barriers to occupational therapy: B hand tremors related to transplant medication  Pt has no cultural, educational or language barriers to learning provided.    Profile and History Assessment of Occupational Performance Level of Clinical Decision Making Complexity Score   Occupational Profile:   Deborah Navas is a 49 y.o. female who lives with an adult  and is currently employed as a  for [employer]. Deborah Navas has difficulty with  feeding, grooming and dressing  phone/computer use  affecting his/her daily functional abilities. Her main goal for therapy is to be able to type so she can keep her job.     Comorbidities:   immunosuppression and tremors    Medical and Therapy History Review:   Expanded               Performance Deficits    Physical:  Joint Mobility  Joint Stability  Muscle Power/Strength  Muscle Endurance  Pinch Strength  Fine Motor Coordination  Muscle Tone    Cognitive:  No Deficits    Psychosocial:    No Deficits     Clinical Decision Making:  low    Assessment Process:  Problem-Focused Assessments    Modification/Need for Assistance:  Minimal-Moderate Modifications/Assistance    Intervention Selection:  Several Treatment Options       low  Based on PMHX, co morbidities , data from assessments and functional level of assistance required with task and clinical presentation directly impacting function.       The following goals were discussed with  the patient and patient is in agreement with them as to be addressed in the treatment plan.     Goals:   Short Term Goals: (4 weeks)  1. Pt will improve opposition strength of B thumbs to 4/5 from 3/5 to improve functional grasp when washing dishes.  2. Pt will improve functional lateral pinch strength of L hand needed for managing buttons 3# from 1#.  3. Pt will improve functional lateral pinch strength of R hand needed for handwriting tasks to 6# from 4#.  4. Pt will improve functional tip pinch strength of B hands needed for typing to 2# from 0.5#.  5. Pt will be Independent with HEP to improve strength and FM skills.     Long Term Goals: (by discharge)  1. Pt will report a decreased limitation score on the QuickDASH assessment of less than 30% by discharge.  2. Pt will improve functional lateral pinch strength of L hand needed for managing buttons 5# from 1#.  3. Pt will improve functional lateral pinch strength of R hand needed for handwriting tasks to 7# from 4#.  4. Pt will improve functional tip pinch strength of B hands needed for typing to 4# from 0.5#.      Plan   Certification Period/Plan of care expiration: 7/13/2020 to 9/2/2020.    Outpatient Occupational Therapy 1-2 times weekly for 8 weeks to include the following interventions:     Modalities for desensitization/sensory re-education (moist heat, paraffin, fluidotherapy, US), manual therapy/joint mobilizations,A/PROM, therapeutic exercises/activities, strengthening, orthotic fitting/fabrication/training PRN, joint protections/energry conservation/adaptive equipment/activity modification, HEP/education as well as any other treatments deemed necessary based on the patient's needs or progress.    INDIO Benavidez, JAGJIT

## 2020-07-17 ENCOUNTER — CLINICAL SUPPORT (OUTPATIENT)
Dept: REHABILITATION | Facility: HOSPITAL | Age: 50
End: 2020-07-17
Payer: COMMERCIAL

## 2020-07-17 DIAGNOSIS — M62.542 THENAR MUSCLE ATROPHY OF LEFT HAND: ICD-10-CM

## 2020-07-17 DIAGNOSIS — M62.541 THENAR MUSCLE ATROPHY OF RIGHT HAND: ICD-10-CM

## 2020-07-17 PROCEDURE — 97535 SELF CARE MNGMENT TRAINING: CPT

## 2020-07-17 PROCEDURE — 97110 THERAPEUTIC EXERCISES: CPT

## 2020-07-17 NOTE — PROGRESS NOTES
Occupational Therapy Daily Treatment Note     Name: Deborah Oliva [facility] Number: [id number]    Therapy Diagnosis:   Encounter Diagnoses   Name Primary?    Thenar muscle atrophy of left hand     Thenar muscle atrophy of right hand      Physician: Miriam Prieto MD    Visit Date: 7/17/2020     Physician Orders: OT eval and treat  Medical Diagnosis: tremors of the nervous system  Evaluation Date: 7/13/2020  Insurance Authorization period Expiration: 9/2/2020  Plan of Care Expiration Period: 9/2/2020  Visit # / Visits authorized: 2/13    Time In:1500  Time Out: 1545  Total Billable Time: 45 minutes    Precautions:  Standard, Immunosuppression and organ transplant    Subjective     Pt reports: she bought bilateral hand thumb supporting braces to wear when typing  she was compliant with home exercise program given last session.     Response to previous treatment: tolerated well with no complaints of pain  Functional change: improved joint protection    Pain: 0/10  Location: 0    Objective     Deborah received the following supervised modalities after being cleared for contradictions for 0 minutes:   - Did Not Occur    Deborah received the following direct contact modalities after being cleared for contraindications for 0 minutes:  - Did Not Occur    Deborah received the following manual therapy techniques for 0 minutes:   - Did Not Occur    Deborah received therapeutic exercises for 30 minutes including:  - yellow putty B hands: pinching between thumbs and small fingers, rolling, gripping  - B index fingers: flicking dice to improve strength   - joint protection    Deborah received self-care techniques for 10 minutes including:  - using button-hook to zip zipper and button buttons on jeans    Deborah participated in dynamic functional therapeutic activities to improve functional performance for 0  minutes, including:  - Did Not Occur    Deborah participated in neuromuscular re-education  activities to improve coordination for 0 minutes, including:  - Did Not Occur    Home Exercises and Education Provided     Pt tolerated treatment well and was very receptive to advice. She recognizes how apply information and education into her everyday lifestyle and she is becoming more cognizant of joint protection.    Education provided:   - use of button hook  - wrist positioning during hand strengthening tasks  - joint protection  - use of thumb supporting splints    Written Home Exercises Provided: Patient instructed to cont prior HEP.  Exercises were reviewed and Deborah was able to demonstrate them prior to the end of the session.  Deborah demonstrated good  understanding of the education provided.   .   See EMR under Patient Instructions for exercises provided prior visit.        Assessment     Pt would continue to benefit from skilled OT.      - Progress towards goals     Deborah is progressing well towards her goals and there are no updates to goals at this time. Pt prognosis is Good.     Pt will continue to benefit from skilled outpatient occupational therapy to address the deficits listed in the problem list on initial evaluation provide pt/family education and to maximize pt's level of independence in the home and community environment.     Anticipated barriers to occupational therapy: thenar atrophy and tremors    Pt's spiritual, cultural and educational needs considered and pt agreeable to plan of care and goals.    Goals:  Short Term Goals: (4 weeks)  1. Pt will improve opposition strength of B thumbs to 4/5 from 3/5 to improve functional grasp when washing dishes.  2. Pt will improve functional lateral pinch strength of L hand needed for managing buttons 3# from 1#.  3. Pt will improve functional lateral pinch strength of R hand needed for handwriting tasks to 6# from 4#.  4. Pt will improve functional tip pinch strength of B hands needed for typing to 2# from 0.5#.  5. Pt will be Independent  with HEP to improve strength and FM skills.      Long Term Goals: (by discharge)  1. Pt will report a decreased limitation score on the QuickDASH assessment of less than 30% by discharge.  2. Pt will improve functional lateral pinch strength of L hand needed for managing buttons 5# from 1#.  3. Pt will improve functional lateral pinch strength of R hand needed for handwriting tasks to 7# from 4#.  4. Pt will improve functional tip pinch strength of B hands needed for typing to 4# from 0.5#.    Plan   Continue Occupational Therapy 2  times per week for 7 weeks to increase strength and functional use of bilateral hands.    INDIO Benavidez, JAGJIT

## 2020-07-17 NOTE — PATIENT INSTRUCTIONS
Wear thumb-supporting splints during functional tasks to improve positioning.  Continue joint protection during strengthening tasks.

## 2020-07-20 ENCOUNTER — CLINICAL SUPPORT (OUTPATIENT)
Dept: REHABILITATION | Facility: HOSPITAL | Age: 50
End: 2020-07-20
Payer: COMMERCIAL

## 2020-07-20 DIAGNOSIS — M62.542 THENAR MUSCLE ATROPHY OF LEFT HAND: ICD-10-CM

## 2020-07-20 DIAGNOSIS — M62.541 THENAR MUSCLE ATROPHY OF RIGHT HAND: ICD-10-CM

## 2020-07-20 PROCEDURE — 97150 GROUP THERAPEUTIC PROCEDURES: CPT

## 2020-07-20 PROCEDURE — 97530 THERAPEUTIC ACTIVITIES: CPT

## 2020-07-20 PROCEDURE — 97760 ORTHOTIC MGMT&TRAING 1ST ENC: CPT

## 2020-07-20 PROCEDURE — 97110 THERAPEUTIC EXERCISES: CPT

## 2020-07-20 NOTE — PROGRESS NOTES
"  Occupational Therapy Daily Treatment Note     Name: Deborah Oliva [facility] Number: [id number]    Therapy Diagnosis:   Encounter Diagnoses   Name Primary?    Thenar muscle atrophy of left hand     Thenar muscle atrophy of right hand      Physician: Miriam Prieto MD    Visit Date: 7/20/2020     Physician Orders: OT eval and treat  Medical Diagnosis: tremors of the nervous system  Evaluation Date: 7/13/2020  Insurance Authorization period Expiration: 9/2/2020  Plan of Care Expiration Period: 9/2/2020  Visit # / Visits authorized: 3/13    Time In:1500  Time Out: 1545  Total Billable Time: 45 minutes    Precautions:  Standard, Immunosuppression and organ transplant    Subjective     Pt reports: "My splints came in on Saturday but I don't think my right one is putting my thumb in a good position."    she was compliant with home exercise program given last session.     Response to previous treatment: tolerated well with no complaints of pain  Functional change: improved awareness of joint positioning during tasks    Pain: 0/10  Location: 0    Objective     Deborah received the following supervised modalities after being cleared for contradictions for 0 minutes:   - Did Not Occur    Deborah received the following direct contact modalities after being cleared for contraindications for 0 minutes:  - Did Not Occur    Deborah received the following manual therapy techniques for 0 minutes:   - Did Not Occur    Deborah received therapeutic exercises for 10 minutes including:  - Progressive Hand Exerciser: black spring (3rd position) 5x10 each hand  - B thumbs: lateral pinching using digiciser with one rubberband for resistance 5x10    Deborah received self-care techniques for 0 minutes including:  - Did Not Occur    Deborah participated in dynamic functional therapeutic activities to improve functional performance for 20 minutes, including:  - R hand: using yellow clothespin with 3pt pinch to  " small foam balls - 2 rounds  - L hand (with splint): using red clothespin with 3pt pinch to  small foam balls - 2 rounds    Deborah participated in neuromuscular re-education activities to improve coordination for 0 minutes, including:  - Did Not Occur    Deborah received orthotic management and training for 15 minutes, including:  - adjustments and modifications to new splints purchased on her own outside of therapy  - fitting when short neoprene thumb splints in clinic to compare to current splints    Home Exercises and Education Provided     Pt's R wrist/thumb splint was noted to be too large and placed thumb into a lateral position rather than an opposable one. L wrist/thumb splint worked well, provided necessary support, but still allowed for functional use. OT recommended pt return R splint and purchase the same brand as the L splint. Attempted fitting with short, neoprene thumb CMC blocking splints but pt c/o less support and protection. Pt with very good awareness now of joint positioning and protection now.    Education provided:   - positioning and purpose of B wrist/thumb splints  - B hand and digit strengthening with splint use    Written Home Exercises Provided: Patient instructed to cont prior HEP.  Exercises were reviewed and Deborah was able to demonstrate them prior to the end of the session.  Deborah demonstrated good  understanding of the education provided.   .   See EMR under Patient Instructions for exercises provided prior visit.        Assessment     Pt would continue to benefit from skilled OT.      - Progress towards goals     Deborah is progressing well towards her goals and there are no updates to goals at this time. Pt prognosis is Good.     Pt will continue to benefit from skilled outpatient occupational therapy to address the deficits listed in the problem list on initial evaluation provide pt/family education and to maximize pt's level of independence in the home and  community environment.     Anticipated barriers to occupational therapy: thenar atrophy and tremors    Pt's spiritual, cultural and educational needs considered and pt agreeable to plan of care and goals.    Goals:  Short Term Goals: (4 weeks)  1. Pt will improve opposition strength of B thumbs to 4/5 from 3/5 to improve functional grasp when washing dishes.  2. Pt will improve functional lateral pinch strength of L hand needed for managing buttons 3# from 1#.  3. Pt will improve functional lateral pinch strength of R hand needed for handwriting tasks to 6# from 4#.  4. Pt will improve functional tip pinch strength of B hands needed for typing to 2# from 0.5#.  5. Pt will be Independent with HEP to improve strength and FM skills.      Long Term Goals: (by discharge)  1. Pt will report a decreased limitation score on the QuickDASH assessment of less than 30% by discharge.  2. Pt will improve functional lateral pinch strength of L hand needed for managing buttons 5# from 1#.  3. Pt will improve functional lateral pinch strength of R hand needed for handwriting tasks to 7# from 4#.  4. Pt will improve functional tip pinch strength of B hands needed for typing to 4# from 0.5#.    Plan   Continue Occupational Therapy 2  times per week for 7 weeks to increase strength and functional use of bilateral hands.    INDIO Benavidez, JAGJIT

## 2020-07-23 ENCOUNTER — TELEPHONE (OUTPATIENT)
Dept: TRANSPLANT | Facility: CLINIC | Age: 50
End: 2020-07-23

## 2020-07-23 DIAGNOSIS — G47.00 INSOMNIA, UNSPECIFIED TYPE: Primary | ICD-10-CM

## 2020-07-23 RX ORDER — ZOLPIDEM TARTRATE 5 MG/1
5 TABLET ORAL NIGHTLY
Qty: 30 TABLET | Refills: 2 | Status: SHIPPED | OUTPATIENT
Start: 2020-07-23 | End: 2020-08-03 | Stop reason: SDUPTHER

## 2020-07-23 NOTE — LETTER
July 23, 2020    Deborah Navas  [address] [place] [address]         [facility]  [address]  [place] [address]  Phone: [phone] July 23, 2020     Patient: Deborah Navas   YOB: 1970   Date of Visit: 7/23/2020       To Whom It May Concern:    It is my medical opinion that Deborah Navas can increase her working hours daily from 5 hours to 6 hours.She will continue alternating working from home and in the office at the employer's discretion. She will need time off to attend her monthly appointments with Heart Transplant in [place] and participate in Cardiac Rehab.     If you have any questions or concerns, please don't hesitate to call.    Sincerely,      Miriam Prieto MD  Medical Director  Advanced Heart Failure and Heart Transplant    Nia BAEZ, RN, CCTC, CCTN  [phone] (fax)

## 2020-07-23 NOTE — TELEPHONE ENCOUNTER
Spoke to pt to clarify the letter needed to increase her hours at work to 6 hours daily. Will fax to [employer] (work) and send a copy in the mail to pt.

## 2020-07-27 ENCOUNTER — LAB VISIT (OUTPATIENT)
Dept: LAB | Facility: HOSPITAL | Age: 50
End: 2020-07-27
Attending: FAMILY MEDICINE
Payer: COMMERCIAL

## 2020-07-27 ENCOUNTER — HOSPITAL ENCOUNTER (OUTPATIENT)
Dept: CARDIOLOGY | Facility: HOSPITAL | Age: 50
Discharge: HOME | End: 2020-07-27
Attending: INTERNAL MEDICINE
Payer: COMMERCIAL

## 2020-07-27 ENCOUNTER — CLINICAL SUPPORT (OUTPATIENT)
Dept: REHABILITATION | Facility: HOSPITAL | Age: 50
End: 2020-07-27
Payer: COMMERCIAL

## 2020-07-27 VITALS
BODY MASS INDEX: 31.39 KG/M2 | HEIGHT: 67 IN | WEIGHT: 200 LBS | SYSTOLIC BLOOD PRESSURE: 130 MMHG | DIASTOLIC BLOOD PRESSURE: 80 MMHG

## 2020-07-27 DIAGNOSIS — T86.298 OTHER COMPLICATION OF HEART TRANSPLANT: ICD-10-CM

## 2020-07-27 DIAGNOSIS — M62.541 THENAR MUSCLE ATROPHY OF RIGHT HAND: ICD-10-CM

## 2020-07-27 DIAGNOSIS — Z79.899 ENCOUNTER FOR LONG-TERM (CURRENT) USE OF MEDICATIONS: ICD-10-CM

## 2020-07-27 DIAGNOSIS — Z94.1 STATUS POST HEART TRANSPLANT: ICD-10-CM

## 2020-07-27 DIAGNOSIS — E78.2 MIXED HYPERLIPIDEMIA: ICD-10-CM

## 2020-07-27 DIAGNOSIS — M62.542 THENAR MUSCLE ATROPHY OF LEFT HAND: ICD-10-CM

## 2020-07-27 DIAGNOSIS — N28.9 RENAL INSUFFICIENCY: ICD-10-CM

## 2020-07-27 LAB
ALBUMIN SERPL BCP-MCNC: 4.1 G/DL (ref 3.5–5.2)
ALP SERPL-CCNC: 61 U/L (ref 55–135)
ALT SERPL W/O P-5'-P-CCNC: 14 U/L (ref 10–44)
ANION GAP SERPL CALC-SCNC: 11 MMOL/L (ref 8–16)
AORTIC ROOT ANNULUS: 2.97 CM
AST SERPL-CCNC: 18 U/L (ref 10–40)
AV INDEX (PROSTH): 0.61
AV MEAN GRADIENT: 7 MMHG
AV PEAK GRADIENT: 13 MMHG
AV VALVE AREA: 1.84 CM2
AV VELOCITY RATIO: 0.61
BASOPHILS # BLD AUTO: 0.04 K/UL (ref 0–0.2)
BASOPHILS NFR BLD: 0.6 % (ref 0–1.9)
BILIRUB SERPL-MCNC: 0.6 MG/DL (ref 0.1–1)
BNP SERPL-MCNC: 51 PG/ML (ref 0–99)
BSA FOR ECHO PROCEDURE: 2.07 M2
BUN SERPL-MCNC: 31 MG/DL (ref 6–20)
CALCIUM SERPL-MCNC: 10 MG/DL (ref 8.7–10.5)
CHLORIDE SERPL-SCNC: 105 MMOL/L (ref 95–110)
CHOLEST SERPL-MCNC: 145 MG/DL (ref 120–199)
CHOLEST/HDLC SERPL: 2.5 {RATIO} (ref 2–5)
CO2 SERPL-SCNC: 29 MMOL/L (ref 23–29)
CREAT SERPL-MCNC: 1.5 MG/DL (ref 0.5–1.4)
CV ECHO LV RWT: 0.6 CM
DIFFERENTIAL METHOD: ABNORMAL
DOP CALC AO PEAK VEL: 1.78 M/S
DOP CALC AO VTI: 34.07 CM
DOP CALC LVOT AREA: 3 CM2
DOP CALC LVOT DIAMETER: 1.96 CM
DOP CALC LVOT PEAK VEL: 1.09 M/S
DOP CALC LVOT STROKE VOLUME: 62.7 CM3
DOP CALC RVOT PEAK VEL: 0.71 M/S
DOP CALC RVOT VTI: 15.95 CM
DOP CALCLVOT PEAK VEL VTI: 20.79 CM
E WAVE DECELERATION TIME: 334.96 MSEC
E/A RATIO: 0.98
E/E' RATIO: 7.55 M/S
ECHO LV POSTERIOR WALL: 1.27 CM (ref 0.6–1.1)
EOSINOPHIL # BLD AUTO: 0.2 K/UL (ref 0–0.5)
EOSINOPHIL NFR BLD: 2.4 % (ref 0–8)
ERYTHROCYTE [DISTWIDTH] IN BLOOD BY AUTOMATED COUNT: 16.2 % (ref 11.5–14.5)
EST. GFR  (AFRICAN AMERICAN): 46.5 ML/MIN/1.73 M^2
EST. GFR  (NON AFRICAN AMERICAN): 40.3 ML/MIN/1.73 M^2
FRACTIONAL SHORTENING: 45 % (ref 28–44)
GLUCOSE SERPL-MCNC: 94 MG/DL (ref 70–110)
HCT VFR BLD AUTO: 35.8 % (ref 37–48.5)
HDLC SERPL-MCNC: 59 MG/DL (ref 40–75)
HDLC SERPL: 40.7 % (ref 20–50)
HGB BLD-MCNC: 10.4 G/DL (ref 12–16)
IMM GRANULOCYTES # BLD AUTO: 0.02 K/UL (ref 0–0.04)
IMM GRANULOCYTES NFR BLD AUTO: 0.3 % (ref 0–0.5)
INTERVENTRICULAR SEPTUM: 1.06 CM (ref 0.6–1.1)
IVRT: 88.49 MSEC
LDLC SERPL CALC-MCNC: 66.8 MG/DL (ref 63–159)
LEFT INTERNAL DIMENSION IN SYSTOLE: 2.34 CM (ref 2.1–4)
LEFT VENTRICLE DIASTOLIC VOLUME INDEX: 40.09 ML/M2
LEFT VENTRICLE DIASTOLIC VOLUME: 81.07 ML
LEFT VENTRICLE MASS INDEX: 86 G/M2
LEFT VENTRICLE SYSTOLIC VOLUME INDEX: 9.4 ML/M2
LEFT VENTRICLE SYSTOLIC VOLUME: 18.91 ML
LEFT VENTRICULAR INTERNAL DIMENSION IN DIASTOLE: 4.26 CM (ref 3.5–6)
LEFT VENTRICULAR MASS: 174.39 G
LV LATERAL E/E' RATIO: 6.38 M/S
LV SEPTAL E/E' RATIO: 9.22 M/S
LYMPHOCYTES # BLD AUTO: 0.7 K/UL (ref 1–4.8)
LYMPHOCYTES NFR BLD: 10.7 % (ref 18–48)
MAGNESIUM SERPL-MCNC: 1.8 MG/DL (ref 1.6–2.6)
MCH RBC QN AUTO: 26.9 PG (ref 27–31)
MCHC RBC AUTO-ENTMCNC: 29.1 G/DL (ref 32–36)
MCV RBC AUTO: 93 FL (ref 82–98)
MONOCYTES # BLD AUTO: 0.6 K/UL (ref 0.3–1)
MONOCYTES NFR BLD: 9.6 % (ref 4–15)
MV PEAK A VEL: 0.85 M/S
MV PEAK E VEL: 0.83 M/S
NEUTROPHILS # BLD AUTO: 4.7 K/UL (ref 1.8–7.7)
NEUTROPHILS NFR BLD: 76.4 % (ref 38–73)
NONHDLC SERPL-MCNC: 86 MG/DL
NRBC BLD-RTO: 0 /100 WBC
PISA TR MAX VEL: 2.5 M/S
PLATELET # BLD AUTO: 369 K/UL (ref 150–350)
PMV BLD AUTO: 9.5 FL (ref 9.2–12.9)
POTASSIUM SERPL-SCNC: 4 MMOL/L (ref 3.5–5.1)
PROT SERPL-MCNC: 6.8 G/DL (ref 6–8.4)
PV MEAN GRADIENT: 1.12 MMHG
PV PEAK VELOCITY: 1.11 CM/S
RA PRESSURE: 3 MMHG
RBC # BLD AUTO: 3.86 M/UL (ref 4–5.4)
SINUS: 2.54 CM
SODIUM SERPL-SCNC: 145 MMOL/L (ref 136–145)
STJ: 2.27 CM
TDI LATERAL: 0.13 M/S
TDI SEPTAL: 0.09 M/S
TDI: 0.11 M/S
TR MAX PG: 25 MMHG
TRIGL SERPL-MCNC: 96 MG/DL (ref 30–150)
TV REST PULMONARY ARTERY PRESSURE: 28 MMHG
WBC # BLD AUTO: 6.17 K/UL (ref 3.9–12.7)

## 2020-07-27 PROCEDURE — 97530 THERAPEUTIC ACTIVITIES: CPT

## 2020-07-27 PROCEDURE — 80061 LIPID PANEL: CPT

## 2020-07-27 PROCEDURE — 93306 TTE W/DOPPLER COMPLETE: CPT | Mod: 26,,, | Performed by: INTERNAL MEDICINE

## 2020-07-27 PROCEDURE — 93306 TTE W/DOPPLER COMPLETE: CPT

## 2020-07-27 PROCEDURE — 97760 ORTHOTIC MGMT&TRAING 1ST ENC: CPT

## 2020-07-27 PROCEDURE — 83735 ASSAY OF MAGNESIUM: CPT

## 2020-07-27 PROCEDURE — 83880 ASSAY OF NATRIURETIC PEPTIDE: CPT

## 2020-07-27 PROCEDURE — 85025 COMPLETE CBC W/AUTO DIFF WBC: CPT

## 2020-07-27 PROCEDURE — 80197 ASSAY OF TACROLIMUS: CPT

## 2020-07-27 PROCEDURE — 80053 COMPREHEN METABOLIC PANEL: CPT

## 2020-07-27 PROCEDURE — 36415 COLL VENOUS BLD VENIPUNCTURE: CPT

## 2020-07-27 PROCEDURE — 97110 THERAPEUTIC EXERCISES: CPT

## 2020-07-27 PROCEDURE — 93306 ECHO (CUPID ONLY): ICD-10-PCS | Mod: 26,,, | Performed by: INTERNAL MEDICINE

## 2020-07-27 NOTE — PROGRESS NOTES
"  Occupational Therapy Daily Treatment Note     Name: Deborah Oliva [facility] Number: [id number]    Therapy Diagnosis:   Encounter Diagnoses   Name Primary?    Thenar muscle atrophy of left hand     Thenar muscle atrophy of right hand      Physician: Miriam Prieto MD    Visit Date: 7/27/2020     Physician Orders: OT eval and treat  Medical Diagnosis: tremors of the nervous system  Evaluation Date: 7/13/2020  Insurance Authorization period Expiration: 9/2/2020  Plan of Care Expiration Period: 9/2/2020  Visit # / Visits authorized: 4/13    Time In:1655  Time Out: 1735  Total Billable Time: 40 minutes    Precautions:  Standard, Immunosuppression and organ transplant    Subjective     Pt reports:"I got my new R hand/thumb splint in today. I like both of my splints now but I can't type on my laptop."    she was compliant with home exercise program given last session.     Response to previous treatment: tolerated well with no complaints of pain  Functional change: improved awareness of joint positioning during tasks    Pain: 0/10  Location: 0    Objective       Meryl received therapeutic exercises for 20 minutes including:  - B hands: rolling a 1# DB into a hook fist: 5x10 each hand (while wearing splints)  - B hands  strengthening: squeezing red therafoam 5x10 each hand (while wearing splints)    Meryl participated in dynamic functional therapeutic activities to improve functional performance for 10 minutes, including:  - B hands FMC: picked up 3-5 dice, one at a time, moving them tip<-->palm and placing back down    Deborah received orthotic management and training for 10 minutes, including:  - adjustments and modifications to new R hand splint purchased on her own outside of therapy      Home Exercises and Education Provided     Education provided:   - positioning and purpose of B wrist/thumb splints  - activity modifications to improve performance    Written Home Exercises Provided: Patient instructed " to cont prior HEP.  Exercises were reviewed and Meryl was able to demonstrate them prior to the end of the session.  Meryl demonstrated good  understanding of the education provided.   .   See EMR under Patient Instructions for exercises provided prior visit.        Assessment     Pt returned to therapy with a new R thumb/hand splint (matching L splint) and reported increased comfort, protection, and positioning of her thumb. However, she reported difficulty with driving and typing while wearing splints. Modifications and suggestions made to improve performance. B hand splints are protecting pt's thumb CMC and MCP joints well and allowing her to strengthen her  without sacrificing joint integrity.    Meryl is progressing well towards her goals and there are no updates to goals at this time. Pt prognosis is Good.     Pt will continue to benefit from skilled outpatient occupational therapy to address the deficits listed in the problem list on initial evaluation provide pt/family education and to maximize pt's level of independence in the home and community environment.     Anticipated barriers to occupational therapy: thenar atrophy and tremors    Pt's spiritual, cultural and educational needs considered and pt agreeable to plan of care and goals.    Goals:  Short Term Goals: (4 weeks)  1. Pt will improve opposition strength of B thumbs to 4/5 from 3/5 to improve functional grasp when washing dishes.  2. Pt will improve functional lateral pinch strength of L hand needed for managing buttons 3# from 1#.  3. Pt will improve functional lateral pinch strength of R hand needed for handwriting tasks to 6# from 4#.  4. Pt will improve functional tip pinch strength of B hands needed for typing to 2# from 0.5#.  5. Pt will be Independent with HEP to improve strength and FM skills.      Long Term Goals: (by discharge)  1. Pt will report a decreased limitation score on the QuickDASH assessment of less than 30% by  discharge.  2. Pt will improve functional lateral pinch strength of L hand needed for managing buttons 5# from 1#.  3. Pt will improve functional lateral pinch strength of R hand needed for handwriting tasks to 7# from 4#.  4. Pt will improve functional tip pinch strength of B hands needed for typing to 4# from 0.5#.    Plan   Continue Occupational Therapy 1-2  times per week for 7 weeks to increase strength and functional use of bilateral hands.    INDIO Benavidez, IRVINR

## 2020-07-28 LAB — TACROLIMUS BLD-MCNC: 6.6 NG/ML (ref 5–15)

## 2020-07-29 ENCOUNTER — LAB VISIT (OUTPATIENT)
Dept: LAB | Facility: HOSPITAL | Age: 50
End: 2020-07-29
Payer: COMMERCIAL

## 2020-07-29 ENCOUNTER — OFFICE VISIT (OUTPATIENT)
Dept: TRANSPLANT | Facility: CLINIC | Age: 50
End: 2020-07-29
Payer: COMMERCIAL

## 2020-07-29 VITALS
DIASTOLIC BLOOD PRESSURE: 75 MMHG | SYSTOLIC BLOOD PRESSURE: 124 MMHG | WEIGHT: 206.13 LBS | HEART RATE: 104 BPM | HEIGHT: 66 IN | BODY MASS INDEX: 33.13 KG/M2

## 2020-07-29 DIAGNOSIS — D86.9 SARCOIDOSIS: Primary | ICD-10-CM

## 2020-07-29 DIAGNOSIS — D86.85 CARDIAC SARCOIDOSIS: ICD-10-CM

## 2020-07-29 DIAGNOSIS — Z94.1 HEART TRANSPLANTED: Primary | ICD-10-CM

## 2020-07-29 DIAGNOSIS — Z94.1 HEART REPLACED BY TRANSPLANT: ICD-10-CM

## 2020-07-29 DIAGNOSIS — G56.00 PARTIAL THENAR ATROPHY, UNSPECIFIED LATERALITY: Primary | ICD-10-CM

## 2020-07-29 DIAGNOSIS — M62.541 THENAR MUSCLE ATROPHY OF RIGHT HAND: ICD-10-CM

## 2020-07-29 DIAGNOSIS — Z94.1 HEART REPLACED BY TRANSPLANT: Primary | ICD-10-CM

## 2020-07-29 DIAGNOSIS — D84.9 IMMUNOSUPPRESSION: ICD-10-CM

## 2020-07-29 PROCEDURE — 99214 OFFICE O/P EST MOD 30 MIN: CPT | Mod: S$GLB,,, | Performed by: INTERNAL MEDICINE

## 2020-07-29 PROCEDURE — 3074F PR MOST RECENT SYSTOLIC BLOOD PRESSURE < 130 MM HG: ICD-10-PCS | Mod: CPTII,S$GLB,, | Performed by: INTERNAL MEDICINE

## 2020-07-29 PROCEDURE — 36415 COLL VENOUS BLD VENIPUNCTURE: CPT

## 2020-07-29 PROCEDURE — 3008F PR BODY MASS INDEX (BMI) DOCUMENTED: ICD-10-PCS | Mod: CPTII,S$GLB,, | Performed by: INTERNAL MEDICINE

## 2020-07-29 PROCEDURE — 99999 PR PBB SHADOW E&M-EST. PATIENT-LVL IV: ICD-10-PCS | Mod: PBBFAC,,, | Performed by: INTERNAL MEDICINE

## 2020-07-29 PROCEDURE — 3008F BODY MASS INDEX DOCD: CPT | Mod: CPTII,S$GLB,, | Performed by: INTERNAL MEDICINE

## 2020-07-29 PROCEDURE — 3078F PR MOST RECENT DIASTOLIC BLOOD PRESSURE < 80 MM HG: ICD-10-PCS | Mod: CPTII,S$GLB,, | Performed by: INTERNAL MEDICINE

## 2020-07-29 PROCEDURE — 99214 PR OFFICE/OUTPT VISIT, EST, LEVL IV, 30-39 MIN: ICD-10-PCS | Mod: S$GLB,,, | Performed by: INTERNAL MEDICINE

## 2020-07-29 PROCEDURE — 3078F DIAST BP <80 MM HG: CPT | Mod: CPTII,S$GLB,, | Performed by: INTERNAL MEDICINE

## 2020-07-29 PROCEDURE — 99999 PR PBB SHADOW E&M-EST. PATIENT-LVL IV: CPT | Mod: PBBFAC,,, | Performed by: INTERNAL MEDICINE

## 2020-07-29 PROCEDURE — 3074F SYST BP LT 130 MM HG: CPT | Mod: CPTII,S$GLB,, | Performed by: INTERNAL MEDICINE

## 2020-07-29 NOTE — LETTER
August 4, 2020        Joaquin Del Toro  [address]  [address]  [place] [address]  Phone: [phone]  Fax: [phone]             [facility] [address]  [facility] [address]  Phone: [phone]   Patient: Deborah Navas   MR Number: [id number]   YOB: 1970   Date of Visit: 7/29/2020       Dear Dr. Joaquin Del Toro    Thank you for referring Deborah Navas to me for evaluation. Attached you will find relevant portions of my assessment and plan of care.    If you have questions, please do not hesitate to call me. I look forward to following Deborah Navas along with you.    Sincerely,    Miriam Prieto MD    Enclosure    If you would like to receive this communication electronically, please contact [email or web address] or [phone] to request [employer] access.    [employer] is a tool which provides read-only access to select patient information with whom you have a relationship. Its easy to use and provides real time access to review your patients record including encounter summaries, notes, results, and demographic information.    If you feel you have received this communication in error or would no longer like to receive these types of communications, please e-mail [email or web address]

## 2020-07-30 ENCOUNTER — TELEPHONE (OUTPATIENT)
Dept: ORTHOPEDICS | Facility: CLINIC | Age: 50
End: 2020-07-30

## 2020-07-30 DIAGNOSIS — M79.643 PAIN IN HAND: Primary | ICD-10-CM

## 2020-07-30 NOTE — TELEPHONE ENCOUNTER
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 Vasques, We want to refer a post heart transplant x 7 months with an initial diagnosis of sarcoidosis who has developed bilateral thenar atrophy. She came to clinic wearing splints today and told us this diagnosis per her occupational therapist in . 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 We would appreciate your advice and if you can facilitate seeing her sooner than later. We are going to explore whether her immunosuppression is contributing or was it  her heart transplant surgery that was the problem? Thanks so much. Nia Pastrana BS, RN, CCTC, CCTN  Heart Transplant

## 2020-07-30 NOTE — TELEPHONE ENCOUNTER
Spoke c pt. Scheduled XR & appt c Dr. Ryan per Dr. Prieto's referral for bilateral thenar atrophy s/p heart transplant. Confirmed appt location & time. Pt expressed understanding & was thankful.

## 2020-08-03 ENCOUNTER — CLINICAL SUPPORT (OUTPATIENT)
Dept: REHABILITATION | Facility: HOSPITAL | Age: 50
End: 2020-08-03
Payer: COMMERCIAL

## 2020-08-03 DIAGNOSIS — M62.542 THENAR MUSCLE ATROPHY OF LEFT HAND: ICD-10-CM

## 2020-08-03 DIAGNOSIS — M62.541 THENAR MUSCLE ATROPHY OF RIGHT HAND: ICD-10-CM

## 2020-08-03 LAB — HEART CARE KIT (SHORE): NORMAL

## 2020-08-03 PROCEDURE — 97110 THERAPEUTIC EXERCISES: CPT

## 2020-08-03 NOTE — PROGRESS NOTES
"  Occupational Therapy Daily Treatment Note     Name: Deborah Oliva [facility] Number: [id number]    Therapy Diagnosis:   Encounter Diagnoses   Name Primary?    Thenar muscle atrophy of left hand     Thenar muscle atrophy of right hand      Physician: Miriam Prieto MD    Visit Date: 8/3/2020     Physician Orders: OT eval and treat  Medical Diagnosis: tremors of the nervous system  Evaluation Date: 7/13/2020  Insurance Authorization period Expiration: 9/2/2020  Plan of Care Expiration Period: 9/2/2020  Visit # / Visits authorized: 5/13    Time In:1540  Time Out: 1620  Total Billable Time: 40 minutes    Precautions:  Standard, Immunosuppression and organ transplant    Subjective     Pt reports: "I saw my cardiologist yesterday and she wasn't happy about the atrophy in my hands. She referred me to a hand specialist."    she was compliant with home exercise program given last session.     Response to previous treatment: tolerated well with no complaints of pain  Functional change: improved awareness of joint positioning during tasks    Pain: 0/10  Location: n/a    Objective     Pt arrived 10 min late to therapy    Meryl received therapeutic exercises for 45 minutes including:  - B hands: rolling Chinese balls - 2 rounds each, 2 min each  - B hands pinch strengthening: using a red clothespin to  foam balls off countertop (while wearing splints)  - B hands: rolling a 1# DB into a hook fist: 5x10 each hand (while wearing splints)  - B hands  strengthening using Progressive Hand Exerciser 5x10 each hand (black spring, 2nd position while wearing splints)      Home Exercises and Education Provided     Education provided:   - positioning and purpose of B wrist/thumb splints  - activity modifications to improve performance    Written Home Exercises Provided: Patient instructed to cont prior HEP.  Exercises were reviewed and Meryl was able to demonstrate them prior to the end of the session.  Meryl " demonstrated good  understanding of the education provided.   .   See EMR under Patient Instructions for exercises provided prior visit.        Assessment     Pt arrived to therapy with 2 new thumb spica splints - this time, without wrist supports. She reported being able to type and drive better with these splints, and the splints provided enough support and joint protection during B hand/digit strengthening tasks.    Meryl is progressing well towards her goals and there are no updates to goals at this time. Pt prognosis is Good.     Pt will continue to benefit from skilled outpatient occupational therapy to address the deficits listed in the problem list on initial evaluation provide pt/family education and to maximize pt's level of independence in the home and community environment.     Anticipated barriers to occupational therapy: thenar atrophy and tremors    Pt's spiritual, cultural and educational needs considered and pt agreeable to plan of care and goals.    Goals:  Short Term Goals: (4 weeks)  1. Pt will improve opposition strength of B thumbs to 4/5 from 3/5 to improve functional grasp when washing dishes.  2. Pt will improve functional lateral pinch strength of L hand needed for managing buttons 3# from 1#.  3. Pt will improve functional lateral pinch strength of R hand needed for handwriting tasks to 6# from 4#.  4. Pt will improve functional tip pinch strength of B hands needed for typing to 2# from 0.5#.  5. Pt will be Independent with HEP to improve strength and FM skills.      Long Term Goals: (by discharge)  1. Pt will report a decreased limitation score on the QuickDASH assessment of less than 30% by discharge.  2. Pt will improve functional lateral pinch strength of L hand needed for managing buttons 5# from 1#.  3. Pt will improve functional lateral pinch strength of R hand needed for handwriting tasks to 7# from 4#.  4. Pt will improve functional tip pinch strength of B hands needed for typing  to 4# from 0.5#.    Plan   Continue Occupational Therapy 1-2  times per week for 7 weeks to increase strength and functional use of bilateral hands.    INDIO Benavidez, IRVINR

## 2020-08-04 PROBLEM — Z74.09 DECREASED FUNCTIONAL MOBILITY AND ENDURANCE: Status: RESOLVED | Noted: 2020-01-31 | Resolved: 2020-08-04

## 2020-08-04 PROBLEM — T38.0X5S ADVERSE EFFECT OF ADRENAL CORTICAL STEROIDS, SEQUELA: Status: RESOLVED | Noted: 2019-12-16 | Resolved: 2020-08-04

## 2020-08-04 PROBLEM — Z74.09 IMPAIRED FUNCTIONAL MOBILITY, BALANCE, AND ENDURANCE: Status: RESOLVED | Noted: 2020-01-31 | Resolved: 2020-08-04

## 2020-08-05 ENCOUNTER — CLINICAL SUPPORT (OUTPATIENT)
Dept: REHABILITATION | Facility: HOSPITAL | Age: 50
End: 2020-08-05
Payer: COMMERCIAL

## 2020-08-05 DIAGNOSIS — M62.542 THENAR MUSCLE ATROPHY OF LEFT HAND: ICD-10-CM

## 2020-08-05 DIAGNOSIS — M62.541 THENAR MUSCLE ATROPHY OF RIGHT HAND: ICD-10-CM

## 2020-08-05 PROCEDURE — 97110 THERAPEUTIC EXERCISES: CPT

## 2020-08-05 NOTE — PROGRESS NOTES
Subjective:   Ms. Navas is a 49 y.o. year old White female who received a donation after brain death heart transplant on 12/16/19.      Rejection status: Low      High Risk Donor: Yes ([facility] lab drawn 1/25/20 Neg results)  CMV status:   Donor: +   Recipient: negative  (CMV IGG nonreactive 2/17)    HPI 50 y/o old WF s/p OHTx on 12/16/2019 (incidentially diagnosed with cardiac sacroid at time of explant) ,s/p BTT HM3 on 9/2019 HLD, obesity, and OA who comes for her routine clinic visit, now 7 months post OHT, and recently s/p lymphocele removal. Patient's lymphocele site has recovered and healed well, patient states she feels wonderful and is very happy with how she is doing. Patient denies N/V/F/C, lightheadedness, dizziness, PND, orthopnea, LE edema, abdominal pain, abdominal pressure, chest pain, chest pressure, syncope, pre-syncope. Of note, had slightly reduced ANC 1250 previously, otherwise no issues with meds or CMV. The biggest thing on her ROS is she has had significant thenar muscle atrophy bilaterally, diagnosed by her PT/OT and wearing braces however not a good explanation or evaluation as to etiology of this. Is not seen by a hand surgeon yet, as this is the first time she is sharing this with our OHT coordinator or a provider. 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     Immuno - prednisone 5 daily / cellcept 500 BID  / tacro 3/3 (goal 7 to 12)     TTE today 65% normal right and left sided function, estimated PA 28mm Hg.          Review of Systems   Constitution: Negative for chills, decreased appetite, diaphoresis, fever, malaise/fatigue (improved), weight gain and weight loss.   Eyes: Negative for visual disturbance.   Cardiovascular: Negative for chest pain, claudication, cyanosis, dyspnea on exertion, irregular heartbeat, leg swelling, near-syncope, orthopnea and palpitations.   Respiratory: Negative for cough, hemoptysis, shortness of breath, sleep  "disturbances due to breathing, snoring, sputum production and wheezing.    Hematologic/Lymphatic: Negative for adenopathy and bleeding problem. Does not bruise/bleed easily.   Skin: Negative for color change, poor wound healing, rash, skin cancer and suspicious lesions.   Musculoskeletal: Positive for muscle weakness. Negative for back pain, falls, gout, joint pain and myalgias.        Thenar muscle atrophy, bilateral, wearing braces   Gastrointestinal: Negative for bloating, abdominal pain, anorexia, constipation, diarrhea, heartburn, hematemesis, hematochezia, hemorrhoids, jaundice, melena, nausea and vomiting.   Genitourinary: Negative for nocturia.   Neurological: Negative for excessive daytime sleepiness, dizziness, focal weakness, headaches, light-headedness, paresthesias, tremors and weakness.   Psychiatric/Behavioral: Negative for depression and memory loss. The patient does not have insomnia and is not nervous/anxious.        Objective:     Physical Exam   Constitutional: She is oriented to person, place, and time. She appears well-developed and well-nourished. No distress.   /75 (BP Location: Right arm, Patient Position: Sitting, BP Method: Large (Automatic))   Pulse 104   Ht 5' 6" (1.676 m)   Wt 93.5 kg (206 lb 2.1 oz)   LMP  (LMP Unknown)   BMI 33.27 kg/m²    HENT:   Head: Normocephalic and atraumatic. Head is without abrasion and without contusion.   Right Ear: External ear normal.   Left Ear: External ear normal.   Nose: Nose normal. No epistaxis.   Mouth/Throat: Oropharynx is clear and moist. Mucous membranes are not cyanotic.   Eyes: Pupils are equal, round, and reactive to light. Conjunctivae and EOM are normal.   Neck: Normal range of motion. Neck supple. No JVD present. No tracheal deviation present. No thyromegaly present.   Cardiovascular: Normal rate, regular rhythm, normal heart sounds and normal pulses. Exam reveals no gallop and no friction rub.   No murmur " heard.  Pulmonary/Chest: Effort normal and breath sounds normal. No stridor. No respiratory distress. She has no wheezes. She has no rales.   Abdominal: Soft. Normal appearance, normal aorta and bowel sounds are normal. She exhibits no distension. There is no abdominal tenderness.   Musculoskeletal:         General: No tenderness or edema.   Lymphadenopathy:     She has no cervical adenopathy.   Neurological: She is alert and oriented to person, place, and time. She has normal strength and normal reflexes. She exhibits normal muscle tone.   Skin: Skin is warm. No rash noted. She is not diaphoretic. No erythema. No pallor.   Psychiatric: She has a normal mood and affect. Her speech is normal and behavior is normal. Judgment and thought content normal. Cognition and memory are normal.   Nursing note and vitals reviewed.      Lab Results   Component Value Date    WBC 6.17 07/27/2020    HGB 10.4 (L) 07/27/2020    HCT 35.8 (L) 07/27/2020    MCV 93 07/27/2020     (H) 07/27/2020    CO2 29 07/27/2020    CREATININE 1.5 (H) 07/27/2020    CALCIUM 10.0 07/27/2020    ALBUMIN 4.1 07/27/2020    AST 18 07/27/2020    BNP 51 07/27/2020    ALT 14 07/27/2020       Lab Results   Component Value Date    INR 1.2 12/23/2019    INR 1.4 (H) 12/22/2019    INR 1.3 (H) 12/21/2019       BNP   Date Value Ref Range Status   07/27/2020 51 0 - 99 pg/mL Final     Comment:     Values of less than 100 pg/ml are consistent with non-CHF populations.   05/18/2020 48 0 - 99 pg/mL Final     Comment:     Values of less than 100 pg/ml are consistent with non-CHF populations.   03/10/2020 157 (H) 0 - 99 pg/mL Final     Comment:     Values of less than 100 pg/ml are consistent with non-CHF populations.       LD   Date Value Ref Range Status   12/16/2019 240 110 - 260 U/L Final     Comment:     Results are increased in hemolyzed samples.   12/15/2019 246 110 - 260 U/L Final     Comment:     Results are increased in hemolyzed samples.   12/14/2019 257 110  - 260 U/L Final     Comment:     Results are increased in hemolyzed samples.       Tacrolimus Lvl   Date Value Ref Range Status   07/27/2020 6.6 5.0 - 15.0 ng/mL Final     Comment:     Testing performed by Liquid Chromatography-Tandem  Mass Spectrometry (LC-MS/MS).  This test was developed and its performance characteristics  determined by [facility], Department of Pathology  and Laboratory Medicine in a manner consistent with CLIA  requirements. It has not been cleared or approved by the [place]  Food and Drug Administration.  This test is used for clinical   purposes.  It should not be regarded as investigational or for  research.         Assessment:     1. Heart transplanted    2. 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 Immunosuppression    4. Thenar muscle atrophy of right hand        Plan:     Problem List Items Addressed This Visit     Heart transplanted - Primary    Immunosuppression    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    Thenar muscle atrophy of right hand        Will refer to hand surgery regarding bilateral thenar muscle atrophy, concerning for systemic etiology or medication vs neurological issue leading to this.   Recovered well from lymphocele removal, glad to see she is moving well and better.   Continue PT/OT.   PET due when can be scheduled to evaluate for sarcoidosis. Remains on prednisone 5mg for the first year at the least.   Return instructions as set forth by post transplant schedule or as needed:    Clinic: Return for labs and/or biopsy weekly the first month, every two weeks during month 2 and then monthly for the first year at the provider or coordinator's discretion. During the second year, return to clinic every 3 months. Post transplant year 3-5 return every 6 months. There will be a comprehensive post transplant evaluation every year that may include LHC/RHC/biopsy, stress test, echo, CXR, and other health  screening exams.    In addition to the clinical assessment, I have ordered Allomap testing for this patient to assist in identification of moderate/severe acute cellular rejection (ACR) in a pt with stable Allograft function instead of endomyocardial biopsy.     Patient is reminded to call with any health changes as these can be early signs of transplant complications. Patient is advised to make sure any new medications or changes of old medications are discussed with a pharmacist or physician knowledgeable with transplant to avoid rejection/drug toxicity related to significant drug interactions.    UNOS Patient Status  Functional Status: 90% - Able to carry on normal activity: minor symptoms of disease  Physical Capacity: No Limitations  Working for Income: No  If no, reason not working: Demands of Treatment    Miriam Prieto MD

## 2020-08-05 NOTE — PROGRESS NOTES
"  Occupational Therapy Daily Treatment Note     Name: Deborah Oliva [facility] Number: [id number]    Therapy Diagnosis:   Encounter Diagnoses   Name Primary?    Thenar muscle atrophy of left hand     Thenar muscle atrophy of right hand      Physician: Miriam Prieto MD    Visit Date: 8/5/2020     Physician Orders: OT eval and treat  Medical Diagnosis: tremors of the nervous system  Evaluation Date: 7/13/2020  Insurance Authorization period Expiration: 9/2/2020  Plan of Care Expiration Period: 9/2/2020  Visit # / Visits authorized: 6/13    Time In:1545  Time Out: 1630  Total Billable Time: 45 minutes    Precautions:  Standard, Immunosuppression and organ transplant    Subjective     Pt reports: "I was so sick yesterday that I had to cancel my apt with my hand specialist. I think I want to hold off on therapy apts until I see that MD."    she was compliant with home exercise program given last session.     Response to previous treatment: tolerated well with no complaints of pain  Functional change: able to type on keyboard    Pain: 0/10  Location: n/a    Objective     Pt arrived 15 min late to therapy    Meryl received therapeutic exercises for 45 minutes including:  - Finger abduction with rubberband and min A  - B hand yellow theraputty exercises  - B hands: rolling a 1# DB into a hook fist: 5x10 each hand (while wearing splints)  - B hands  strengthening using Progressive Hand Exerciser 5x10 each hand (black spring, 2nd position while wearing splints)      Home Exercises and Education Provided     Education provided:   - yellow theraputty HEP    Written Home Exercises Provided: Patient instructed to cont prior HEP.  Exercises were reviewed and Meryl was able to demonstrate them prior to the end of the session.  Meryl demonstrated good  understanding of the education provided.   .   See EMR under Patient Instructions for exercises provided prior visit.        Assessment     Pt is making steady progress " with therapy. She is benefiting from wearing B thumb spica splints during  and pinch strengthening tasks for joint protection. Pt to meet with hand specialist regarding B hand atrophy and will make a decision of whether or not to continue therapy then.    Meryl is progressing well towards her goals and there are no updates to goals at this time. Pt prognosis is Good.     Pt will continue to benefit from skilled outpatient occupational therapy to address the deficits listed in the problem list on initial evaluation provide pt/family education and to maximize pt's level of independence in the home and community environment.     Anticipated barriers to occupational therapy: thenar atrophy and tremors    Pt's spiritual, cultural and educational needs considered and pt agreeable to plan of care and goals.    Goals:  Short Term Goals: (4 weeks)  1. Pt will improve opposition strength of B thumbs to 4/5 from 3/5 to improve functional grasp when washing dishes.  2. Pt will improve functional lateral pinch strength of L hand needed for managing buttons 3# from 1#.  3. Pt will improve functional lateral pinch strength of R hand needed for handwriting tasks to 6# from 4#.  4. Pt will improve functional tip pinch strength of B hands needed for typing to 2# from 0.5#.  5. Pt will be Independent with HEP to improve strength and FM skills.      Long Term Goals: (by discharge)  1. Pt will report a decreased limitation score on the QuickDASH assessment of less than 30% by discharge.  2. Pt will improve functional lateral pinch strength of L hand needed for managing buttons 5# from 1#.  3. Pt will improve functional lateral pinch strength of R hand needed for handwriting tasks to 7# from 4#.  4. Pt will improve functional tip pinch strength of B hands needed for typing to 4# from 0.5#.    Plan   Continue Occupational Therapy 1-2  times per week for 7 weeks to increase strength and functional use of bilateral hands.    Cherelle  INDIO Carmen, JAGJIT

## 2020-08-14 ENCOUNTER — TELEPHONE (OUTPATIENT)
Dept: TRANSPLANT | Facility: CLINIC | Age: 50
End: 2020-08-14

## 2020-08-16 ENCOUNTER — TELEPHONE (OUTPATIENT)
Dept: ORTHOPEDICS | Facility: CLINIC | Age: 50
End: 2020-08-16

## 2020-08-16 NOTE — TELEPHONE ENCOUNTER
Attempted to contact pt. Left voicemail. Informed pt that XRs are needed prior to appt. Advised pt to arrive for XR appt 30 minutes prior to scheduled appt c Dr. Ryan. Advised pt that XR is located on 1st floor of [facility]. Asked pt to return call to office at [phone] or wiMAN message c additional questions or concerns.

## 2020-08-17 ENCOUNTER — TELEPHONE (OUTPATIENT)
Dept: ORTHOPEDICS | Facility: CLINIC | Age: 50
End: 2020-08-17

## 2020-08-18 ENCOUNTER — HOSPITAL ENCOUNTER (OUTPATIENT)
Dept: RADIOLOGY | Facility: OTHER | Age: 50
Discharge: HOME | End: 2020-08-18
Attending: ORTHOPAEDIC SURGERY
Payer: COMMERCIAL

## 2020-08-18 ENCOUNTER — TELEPHONE (OUTPATIENT)
Dept: PAIN MEDICINE | Facility: CLINIC | Age: 50
End: 2020-08-18

## 2020-08-18 ENCOUNTER — OFFICE VISIT (OUTPATIENT)
Dept: ORTHOPEDICS | Facility: CLINIC | Age: 50
End: 2020-08-18
Attending: INTERNAL MEDICINE
Payer: COMMERCIAL

## 2020-08-18 VITALS
SYSTOLIC BLOOD PRESSURE: 133 MMHG | WEIGHT: 206 LBS | HEIGHT: 66 IN | OXYGEN SATURATION: 96 % | HEART RATE: 104 BPM | DIASTOLIC BLOOD PRESSURE: 83 MMHG | BODY MASS INDEX: 33.11 KG/M2

## 2020-08-18 DIAGNOSIS — G56.00 PARTIAL THENAR ATROPHY, UNSPECIFIED LATERALITY: ICD-10-CM

## 2020-08-18 DIAGNOSIS — R29.898 WEAKNESS OF BOTH HANDS: Primary | ICD-10-CM

## 2020-08-18 DIAGNOSIS — M79.643 PAIN IN HAND: ICD-10-CM

## 2020-08-18 DIAGNOSIS — R29.898 WEAKNESS OF BOTH HANDS: ICD-10-CM

## 2020-08-18 PROCEDURE — 73080 X-RAY EXAM OF ELBOW: CPT | Mod: TC,FY,LT

## 2020-08-18 PROCEDURE — 73130 X-RAY EXAM OF HAND: CPT | Mod: TC,50,FY

## 2020-08-18 PROCEDURE — 99999 PR PBB SHADOW E&M-EST. PATIENT-LVL V: CPT | Mod: PBBFAC,,, | Performed by: ORTHOPAEDIC SURGERY

## 2020-08-18 PROCEDURE — 73130 XR HAND COMPLETE 3 VIEWS BILATERAL: ICD-10-PCS | Mod: 26,,, | Performed by: RADIOLOGY

## 2020-08-18 PROCEDURE — 3075F PR MOST RECENT SYSTOLIC BLOOD PRESS GE 130-139MM HG: ICD-10-PCS | Mod: CPTII,S$GLB,, | Performed by: ORTHOPAEDIC SURGERY

## 2020-08-18 PROCEDURE — 73080 X-RAY EXAM OF ELBOW: CPT | Mod: 26,LT,, | Performed by: RADIOLOGY

## 2020-08-18 PROCEDURE — 73080 XR ELBOW COMPLETE 3 VIEW LEFT: ICD-10-PCS | Mod: 26,LT,, | Performed by: RADIOLOGY

## 2020-08-18 PROCEDURE — 3075F SYST BP GE 130 - 139MM HG: CPT | Mod: CPTII,S$GLB,, | Performed by: ORTHOPAEDIC SURGERY

## 2020-08-18 PROCEDURE — 73130 X-RAY EXAM OF HAND: CPT | Mod: 26,,, | Performed by: RADIOLOGY

## 2020-08-18 PROCEDURE — 99204 OFFICE O/P NEW MOD 45 MIN: CPT | Mod: S$GLB,,, | Performed by: ORTHOPAEDIC SURGERY

## 2020-08-18 PROCEDURE — 99999 PR PBB SHADOW E&M-EST. PATIENT-LVL V: ICD-10-PCS | Mod: PBBFAC,,, | Performed by: ORTHOPAEDIC SURGERY

## 2020-08-18 PROCEDURE — 99204 PR OFFICE/OUTPT VISIT, NEW, LEVL IV, 45-59 MIN: ICD-10-PCS | Mod: S$GLB,,, | Performed by: ORTHOPAEDIC SURGERY

## 2020-08-18 PROCEDURE — 3079F PR MOST RECENT DIASTOLIC BLOOD PRESSURE 80-89 MM HG: ICD-10-PCS | Mod: CPTII,S$GLB,, | Performed by: ORTHOPAEDIC SURGERY

## 2020-08-18 PROCEDURE — 3079F DIAST BP 80-89 MM HG: CPT | Mod: CPTII,S$GLB,, | Performed by: ORTHOPAEDIC SURGERY

## 2020-08-18 PROCEDURE — 3008F PR BODY MASS INDEX (BMI) DOCUMENTED: ICD-10-PCS | Mod: CPTII,S$GLB,, | Performed by: ORTHOPAEDIC SURGERY

## 2020-08-18 PROCEDURE — 3008F BODY MASS INDEX DOCD: CPT | Mod: CPTII,S$GLB,, | Performed by: ORTHOPAEDIC SURGERY

## 2020-08-18 NOTE — TELEPHONE ENCOUNTER
----- Message from Renetta Bermudez sent at 8/18/2020  4:04 PM CDT -----  Regarding: EMG needed  Good afternoon -     Pt was seen today by Dr. Ryan at the [facility] in [place]. Pt lives in [place] & would like to have EMG completed at the [facility] location.     Can you please reach out to pt for EMG scheduling? I will get pt scheduled to f/u with Dr. Ryan once EMG has been performed.     Thank you in advance-    Renetta Bermudez, MS, OTC  Clinical/OR Assistant to Shelly Vasques MD  [facility]  [phone]
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
## 2020-08-19 ENCOUNTER — TELEPHONE (OUTPATIENT)
Dept: NEUROLOGY | Facility: CLINIC | Age: 50
End: 2020-08-19

## 2020-08-19 NOTE — TELEPHONE ENCOUNTER
----- Message from Hunter Tate sent at 8/19/2020 10:18 AM CDT -----  Pt is requesting a call from nurse to have nerve test done      Please call pt back at [phone]

## 2020-08-21 ENCOUNTER — APPOINTMENT (OUTPATIENT)
Dept: RADIOLOGY | Facility: HOSPITAL | Age: 50
End: 2020-08-21
Attending: INTERNAL MEDICINE
Payer: COMMERCIAL

## 2020-08-21 ENCOUNTER — TELEPHONE (OUTPATIENT)
Dept: TRANSPLANT | Facility: CLINIC | Age: 50
End: 2020-08-21

## 2020-08-21 DIAGNOSIS — Z79.52 LONG TERM CURRENT USE OF SYSTEMIC STEROIDS: ICD-10-CM

## 2020-08-21 PROCEDURE — 77080 DXA BONE DENSITY AXIAL: CPT | Mod: 26,,, | Performed by: RADIOLOGY

## 2020-08-21 PROCEDURE — 77080 DXA BONE DENSITY AXIAL: CPT | Mod: TC

## 2020-08-21 PROCEDURE — 77080 DEXA BONE DENSITY SPINE HIP: ICD-10-PCS | Mod: 26,,, | Performed by: RADIOLOGY

## 2020-08-21 NOTE — TELEPHONE ENCOUNTER
Sent results of BMD and echo through [facility]. Also, informed her of pet scans in sept for Sarcoidosis.

## 2020-08-24 NOTE — PROGRESS NOTES
I have personally taken the history and examined this patient. I agree with the resident's note as stated above. This is a complex problem. Plan for nerve testing for eval.       49 y.o. yo female with decreased sensation in ulnar nerve distribution and 1st dorsal interosseus muscle wasting bilaterally, unclear how this may be related to heart transplant, although sarcoidosis can affect the ulnar nerve     Plan:   - EMG for further evaluation of ulnar nerve  - F/u for EMG results   Discussed with pt that if there is not peripheral nerve compression, hand surgery will not be indicated. This could be stretch or positioning. Pt understands

## 2020-08-25 ENCOUNTER — LAB VISIT (OUTPATIENT)
Dept: LAB | Facility: HOSPITAL | Age: 50
End: 2020-08-25
Attending: INTERNAL MEDICINE
Payer: COMMERCIAL

## 2020-08-25 DIAGNOSIS — T86.298 OTHER COMPLICATION OF HEART TRANSPLANT: ICD-10-CM

## 2020-08-25 DIAGNOSIS — E78.2 MIXED HYPERLIPIDEMIA: ICD-10-CM

## 2020-08-25 DIAGNOSIS — N28.9 RENAL INSUFFICIENCY: ICD-10-CM

## 2020-08-25 DIAGNOSIS — Z94.1 STATUS POST HEART TRANSPLANT: ICD-10-CM

## 2020-08-25 DIAGNOSIS — Z79.899 ENCOUNTER FOR LONG-TERM (CURRENT) USE OF MEDICATIONS: ICD-10-CM

## 2020-08-25 DIAGNOSIS — Z94.1 HEART REPLACED BY TRANSPLANT: ICD-10-CM

## 2020-08-25 LAB
ALBUMIN SERPL BCP-MCNC: 4.1 G/DL (ref 3.5–5.2)
ALP SERPL-CCNC: 73 U/L (ref 55–135)
ALT SERPL W/O P-5'-P-CCNC: 13 U/L (ref 10–44)
ANION GAP SERPL CALC-SCNC: 9 MMOL/L (ref 8–16)
AST SERPL-CCNC: 15 U/L (ref 10–40)
BASOPHILS # BLD AUTO: 0.05 K/UL (ref 0–0.2)
BASOPHILS NFR BLD: 0.8 % (ref 0–1.9)
BILIRUB SERPL-MCNC: 0.4 MG/DL (ref 0.1–1)
BNP SERPL-MCNC: 49 PG/ML (ref 0–99)
BUN SERPL-MCNC: 28 MG/DL (ref 6–20)
CALCIUM SERPL-MCNC: 9.8 MG/DL (ref 8.7–10.5)
CHLORIDE SERPL-SCNC: 105 MMOL/L (ref 95–110)
CHOLEST SERPL-MCNC: 134 MG/DL (ref 120–199)
CHOLEST/HDLC SERPL: 2.7 {RATIO} (ref 2–5)
CO2 SERPL-SCNC: 26 MMOL/L (ref 23–29)
CREAT SERPL-MCNC: 1.3 MG/DL (ref 0.5–1.4)
DIFFERENTIAL METHOD: ABNORMAL
EOSINOPHIL # BLD AUTO: 0.2 K/UL (ref 0–0.5)
EOSINOPHIL NFR BLD: 2.4 % (ref 0–8)
ERYTHROCYTE [DISTWIDTH] IN BLOOD BY AUTOMATED COUNT: 14.4 % (ref 11.5–14.5)
EST. GFR  (AFRICAN AMERICAN): 55.3 ML/MIN/1.73 M^2
EST. GFR  (NON AFRICAN AMERICAN): 48 ML/MIN/1.73 M^2
GLUCOSE SERPL-MCNC: 90 MG/DL (ref 70–110)
HCT VFR BLD AUTO: 37.1 % (ref 37–48.5)
HDLC SERPL-MCNC: 50 MG/DL (ref 40–75)
HDLC SERPL: 37.3 % (ref 20–50)
HGB BLD-MCNC: 10.7 G/DL (ref 12–16)
IMM GRANULOCYTES # BLD AUTO: 0.03 K/UL (ref 0–0.04)
IMM GRANULOCYTES NFR BLD AUTO: 0.5 % (ref 0–0.5)
LDLC SERPL CALC-MCNC: 61.6 MG/DL (ref 63–159)
LYMPHOCYTES # BLD AUTO: 0.7 K/UL (ref 1–4.8)
LYMPHOCYTES NFR BLD: 10.2 % (ref 18–48)
MAGNESIUM SERPL-MCNC: 1.7 MG/DL (ref 1.6–2.6)
MCH RBC QN AUTO: 27.1 PG (ref 27–31)
MCHC RBC AUTO-ENTMCNC: 28.8 G/DL (ref 32–36)
MCV RBC AUTO: 94 FL (ref 82–98)
MONOCYTES # BLD AUTO: 0.4 K/UL (ref 0.3–1)
MONOCYTES NFR BLD: 6.2 % (ref 4–15)
NEUTROPHILS # BLD AUTO: 5.3 K/UL (ref 1.8–7.7)
NEUTROPHILS NFR BLD: 79.9 % (ref 38–73)
NONHDLC SERPL-MCNC: 84 MG/DL
NRBC BLD-RTO: 0 /100 WBC
PLATELET # BLD AUTO: 385 K/UL (ref 150–350)
PMV BLD AUTO: 9.3 FL (ref 9.2–12.9)
POTASSIUM SERPL-SCNC: 3.7 MMOL/L (ref 3.5–5.1)
PROT SERPL-MCNC: 6.8 G/DL (ref 6–8.4)
RBC # BLD AUTO: 3.95 M/UL (ref 4–5.4)
SODIUM SERPL-SCNC: 140 MMOL/L (ref 136–145)
TRIGL SERPL-MCNC: 112 MG/DL (ref 30–150)
WBC # BLD AUTO: 6.59 K/UL (ref 3.9–12.7)

## 2020-08-25 PROCEDURE — 86352 CELL FUNCTION ASSAY W/STIM: CPT

## 2020-08-25 PROCEDURE — 85025 COMPLETE CBC W/AUTO DIFF WBC: CPT

## 2020-08-25 PROCEDURE — 83735 ASSAY OF MAGNESIUM: CPT

## 2020-08-25 PROCEDURE — 86832 HLA CLASS I HIGH DEFIN QUAL: CPT

## 2020-08-25 PROCEDURE — 36415 COLL VENOUS BLD VENIPUNCTURE: CPT

## 2020-08-25 PROCEDURE — 83880 ASSAY OF NATRIURETIC PEPTIDE: CPT

## 2020-08-25 PROCEDURE — 80053 COMPREHEN METABOLIC PANEL: CPT

## 2020-08-25 PROCEDURE — 80197 ASSAY OF TACROLIMUS: CPT

## 2020-08-25 PROCEDURE — 86833 HLA CLASS II HIGH DEFIN QUAL: CPT

## 2020-08-25 PROCEDURE — 86832 HLA CLASS I HIGH DEFIN QUAL: CPT | Mod: 59

## 2020-08-25 PROCEDURE — 86977 RBC SERUM PRETX INCUBJ/INHIB: CPT | Mod: 59

## 2020-08-25 PROCEDURE — 80061 LIPID PANEL: CPT

## 2020-08-25 PROCEDURE — 86833 HLA CLASS II HIGH DEFIN QUAL: CPT | Mod: 59

## 2020-08-26 LAB — TACROLIMUS BLD-MCNC: 9.5 NG/ML (ref 5–15)

## 2020-08-27 LAB
CMV DNA SERPL NAA+PROBE-ACNC: NORMAL IU/ML
IMMUNKNOW (STIMULATED): 352 NG/ML (ref 226–524)

## 2020-08-28 LAB
CLASS I ANTIBODY COMMENTS - LUMINEX: NORMAL
CLASS II ANTIBODY COMMENTS - LUMINEX: NORMAL
DSA1 TESTING DATE: NORMAL
DSA12 TESTING DATE: NORMAL
DSA2 TESTING DATE: NORMAL
SERUM COLLECTION DT - LUMINEX CLASS I: NORMAL
SERUM COLLECTION DT - LUMINEX CLASS II: NORMAL

## 2020-08-31 ENCOUNTER — TELEPHONE (OUTPATIENT)
Dept: TRANSPLANT | Facility: CLINIC | Age: 50
End: 2020-08-31

## 2020-08-31 ENCOUNTER — OFFICE VISIT (OUTPATIENT)
Dept: TRANSPLANT | Facility: CLINIC | Age: 50
End: 2020-08-31
Payer: COMMERCIAL

## 2020-08-31 ENCOUNTER — LAB VISIT (OUTPATIENT)
Dept: LAB | Facility: HOSPITAL | Age: 50
End: 2020-08-31
Payer: COMMERCIAL

## 2020-08-31 ENCOUNTER — HOSPITAL ENCOUNTER (OUTPATIENT)
Dept: CARDIOLOGY | Facility: HOSPITAL | Age: 50
Discharge: HOME | End: 2020-08-31
Attending: INTERNAL MEDICINE
Payer: COMMERCIAL

## 2020-08-31 VITALS
SYSTOLIC BLOOD PRESSURE: 143 MMHG | WEIGHT: 211 LBS | HEART RATE: 110 BPM | BODY MASS INDEX: 33.12 KG/M2 | DIASTOLIC BLOOD PRESSURE: 88 MMHG | HEIGHT: 67 IN

## 2020-08-31 VITALS
HEIGHT: 67 IN | WEIGHT: 210.56 LBS | BODY MASS INDEX: 33.05 KG/M2 | DIASTOLIC BLOOD PRESSURE: 88 MMHG | HEART RATE: 108 BPM | SYSTOLIC BLOOD PRESSURE: 143 MMHG

## 2020-08-31 DIAGNOSIS — Z13.79 GENETIC SCREENING: ICD-10-CM

## 2020-08-31 DIAGNOSIS — Z94.1 HEART REPLACED BY TRANSPLANT: ICD-10-CM

## 2020-08-31 DIAGNOSIS — D86.85 CARDIAC SARCOIDOSIS: ICD-10-CM

## 2020-08-31 DIAGNOSIS — Z94.1 HEART TRANSPLANTED: ICD-10-CM

## 2020-08-31 DIAGNOSIS — Z94.1 STATUS POST HEART TRANSPLANT: ICD-10-CM

## 2020-08-31 DIAGNOSIS — T86.298 OTHER COMPLICATION OF HEART TRANSPLANT: ICD-10-CM

## 2020-08-31 DIAGNOSIS — R25.1 TREMOR: Primary | ICD-10-CM

## 2020-08-31 DIAGNOSIS — M62.542 THENAR MUSCLE ATROPHY OF LEFT HAND: ICD-10-CM

## 2020-08-31 DIAGNOSIS — Z79.899 ENCOUNTER FOR LONG-TERM (CURRENT) USE OF MEDICATIONS: ICD-10-CM

## 2020-08-31 DIAGNOSIS — M62.541 THENAR MUSCLE ATROPHY OF RIGHT HAND: ICD-10-CM

## 2020-08-31 LAB
ASCENDING AORTA: 2.43 CM
AV INDEX (PROSTH): 0.75
AV MEAN GRADIENT: 6 MMHG
AV PEAK GRADIENT: 12 MMHG
AV VALVE AREA: 2.86 CM2
AV VELOCITY RATIO: 0.69
BSA FOR ECHO PROCEDURE: 2.13 M2
CV ECHO LV RWT: 0.4 CM
DOP CALC AO PEAK VEL: 1.72 M/S
DOP CALC AO VTI: 29.82 CM
DOP CALC LVOT AREA: 3.8 CM2
DOP CALC LVOT DIAMETER: 2.2 CM
DOP CALC LVOT PEAK VEL: 1.18 M/S
DOP CALC LVOT STROKE VOLUME: 85.14 CM3
DOP CALCLVOT PEAK VEL VTI: 22.41 CM
E WAVE DECELERATION TIME: 157.55 MSEC
E/A RATIO: 1.19
E/E' RATIO: 9.68 M/S
ECHO LV POSTERIOR WALL: 0.83 CM (ref 0.6–1.1)
FRACTIONAL SHORTENING: 35 % (ref 28–44)
INTERVENTRICULAR SEPTUM: 0.76 CM (ref 0.6–1.1)
LEFT INTERNAL DIMENSION IN SYSTOLE: 2.65 CM (ref 2.1–4)
LEFT VENTRICLE DIASTOLIC VOLUME INDEX: 35.84 ML/M2
LEFT VENTRICLE DIASTOLIC VOLUME: 74.13 ML
LEFT VENTRICLE MASS INDEX: 47 G/M2
LEFT VENTRICLE SYSTOLIC VOLUME INDEX: 12.4 ML/M2
LEFT VENTRICLE SYSTOLIC VOLUME: 25.75 ML
LEFT VENTRICULAR INTERNAL DIMENSION IN DIASTOLE: 4.1 CM (ref 3.5–6)
LEFT VENTRICULAR MASS: 96.53 G
LV LATERAL E/E' RATIO: 8.36 M/S
LV SEPTAL E/E' RATIO: 11.5 M/S
MV PEAK A VEL: 0.77 M/S
MV PEAK E VEL: 0.92 M/S
MV STENOSIS PRESSURE HALF TIME: 45.69 MS
MV VALVE AREA P 1/2 METHOD: 4.82 CM2
PISA TR MAX VEL: 2.61 M/S
RA PRESSURE: 3 MMHG
RIGHT VENTRICULAR END-DIASTOLIC DIMENSION: 3.38 CM
SINUS: 2.87 CM
STJ: 2.2 CM
TDI LATERAL: 0.11 M/S
TDI SEPTAL: 0.08 M/S
TDI: 0.1 M/S
TR MAX PG: 27 MMHG
TV REST PULMONARY ARTERY PRESSURE: 30 MMHG

## 2020-08-31 PROCEDURE — 36415 COLL VENOUS BLD VENIPUNCTURE: CPT

## 2020-08-31 PROCEDURE — 3077F SYST BP >= 140 MM HG: CPT | Mod: CPTII,S$GLB,, | Performed by: INTERNAL MEDICINE

## 2020-08-31 PROCEDURE — 3008F BODY MASS INDEX DOCD: CPT | Mod: CPTII,S$GLB,, | Performed by: INTERNAL MEDICINE

## 2020-08-31 PROCEDURE — 93306 ECHO (CUPID ONLY): ICD-10-PCS | Mod: 26,,, | Performed by: INTERNAL MEDICINE

## 2020-08-31 PROCEDURE — 3079F PR MOST RECENT DIASTOLIC BLOOD PRESSURE 80-89 MM HG: ICD-10-PCS | Mod: CPTII,S$GLB,, | Performed by: INTERNAL MEDICINE

## 2020-08-31 PROCEDURE — 99215 OFFICE O/P EST HI 40 MIN: CPT | Mod: S$GLB,,, | Performed by: INTERNAL MEDICINE

## 2020-08-31 PROCEDURE — 93306 TTE W/DOPPLER COMPLETE: CPT

## 2020-08-31 PROCEDURE — 3077F PR MOST RECENT SYSTOLIC BLOOD PRESSURE >= 140 MM HG: ICD-10-PCS | Mod: CPTII,S$GLB,, | Performed by: INTERNAL MEDICINE

## 2020-08-31 PROCEDURE — 3079F DIAST BP 80-89 MM HG: CPT | Mod: CPTII,S$GLB,, | Performed by: INTERNAL MEDICINE

## 2020-08-31 PROCEDURE — 99999 PR PBB SHADOW E&M-EST. PATIENT-LVL III: CPT | Mod: PBBFAC,,, | Performed by: INTERNAL MEDICINE

## 2020-08-31 PROCEDURE — 99999 PR PBB SHADOW E&M-EST. PATIENT-LVL III: ICD-10-PCS | Mod: PBBFAC,,, | Performed by: INTERNAL MEDICINE

## 2020-08-31 PROCEDURE — 93306 TTE W/DOPPLER COMPLETE: CPT | Mod: 26,,, | Performed by: INTERNAL MEDICINE

## 2020-08-31 PROCEDURE — 3008F PR BODY MASS INDEX (BMI) DOCUMENTED: ICD-10-PCS | Mod: CPTII,S$GLB,, | Performed by: INTERNAL MEDICINE

## 2020-08-31 PROCEDURE — 99215 PR OFFICE/OUTPT VISIT, EST, LEVL V, 40-54 MIN: ICD-10-PCS | Mod: S$GLB,,, | Performed by: INTERNAL MEDICINE

## 2020-08-31 NOTE — LETTER
August 31, 2020        Joaquin Del Toro  [address]  [address]  [place] [facility] [address]  Phone: [phone]  Fax: [phone]             [id number]  [address]  [facility] [address]  Phone: [phone]   Patient: Deborah Navas   MR Number: [id number]   YOB: 1970   Date of Visit: 8/31/2020       Dear Dr. Joaquin Del Toro    Thank you for referring Deborah Navas to me for evaluation. Attached you will find relevant portions of my assessment and plan of care.    If you have questions, please do not hesitate to call me. I look forward to following Deborah Navas along with you.    Sincerely,    Johny Zarate MD    Enclosure    If you would like to receive this communication electronically, please contact [email or web address] or [phone] to request [employer] access.    [employer] is a tool which provides read-only access to select patient information with whom you have a relationship. Its easy to use and provides real time access to review your patients record including encounter summaries, notes, results, and demographic information.    If you feel you have received this communication in error or would no longer like to receive these types of communications, please e-mail [email or web address]

## 2020-08-31 NOTE — PROGRESS NOTES
Subjective:   Ms. Navas is a 49 y.o. year old White female who received a donation after brain death heart transplant on 12/16/19.      Rejection status: Low      High Risk Donor: Yes ([facility] lab drawn 1/25/20 Neg results)  CMV status:   Donor: +   Recipient: negative  (CMV IGG nonreactive 2/17)    HPI 48 y/o old WF s/p OHTx on 12/16/2019 (incidentially diagnosed with cardiac sacroid at time of explant) ,s/p BTT HM3 on 9/2019 HLD, obesity, and OA who comes for her routine clinic visit, now 7 months post OHT, and recently s/p lymphocele removal. Patient's lymphocele site has recovered and healed well, patient states she feels wonderful and is very happy with how she is doing. Patient denies N/V/F/C, lightheadedness, dizziness, PND, orthopnea, LE edema, abdominal pain, abdominal pressure, chest pain, chest pressure, syncope, pre-syncope. Of note, had slightly reduced ANC 1250 previously, otherwise no issues with meds or CMV. The biggest thing on her ROS is she has had significant thenar muscle atrophy bilaterally, diagnosed by her PT/OT and wearing braces however not a good explanation or evaluation as to etiology of this. Is not seen by a hand surgeon yet, as this is the first time she is sharing this with our OHT coordinator or a provider. 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     Immuno - prednisone 5 daily / cellcept 500 BID  / tacro 3/3 (goal 7 to 12)       Doing well muscle wasting in both hands awaiting EMG        Review of Systems   Constitution: Negative for chills, decreased appetite, diaphoresis, fever, malaise/fatigue (improved), weight gain and weight loss.   Eyes: Negative for visual disturbance.   Cardiovascular: Negative for chest pain, claudication, cyanosis, dyspnea on exertion, irregular heartbeat, leg swelling, near-syncope, orthopnea and palpitations.   Respiratory: Negative for cough, hemoptysis, shortness of breath, sleep disturbances due to  breathing, snoring, sputum production and wheezing.    Hematologic/Lymphatic: Negative for adenopathy and bleeding problem. Does not bruise/bleed easily.   Skin: Negative for color change, poor wound healing, rash, skin cancer and suspicious lesions.   Musculoskeletal: Positive for muscle weakness. Negative for back pain, falls, gout, joint pain and myalgias.        Thenar muscle atrophy, bilateral, wearing braces   Gastrointestinal: Negative for bloating, abdominal pain, anorexia, constipation, diarrhea, heartburn, hematemesis, hematochezia, hemorrhoids, jaundice, melena, nausea and vomiting.   Genitourinary: Negative for nocturia.   Neurological: Negative for excessive daytime sleepiness, dizziness, focal weakness, headaches, light-headedness, paresthesias, tremors and weakness.   Psychiatric/Behavioral: Negative for depression and memory loss. The patient does not have insomnia and is not nervous/anxious.        Objective:     Physical Exam   Constitutional: She is oriented to person, place, and time. She appears well-developed and well-nourished. No distress.   HENT:   Head: Normocephalic and atraumatic. Head is without abrasion and without contusion.   Right Ear: External ear normal.   Left Ear: External ear normal.   Nose: Nose normal. No epistaxis.   Mouth/Throat: Oropharynx is clear and moist. Mucous membranes are not cyanotic.   Eyes: Pupils are equal, round, and reactive to light. Conjunctivae and EOM are normal.   Neck: Normal range of motion. Neck supple. No JVD present. No tracheal deviation present. No thyromegaly present.   Cardiovascular: Normal rate, regular rhythm, normal heart sounds and normal pulses. Exam reveals no gallop and no friction rub.   No murmur heard.  Pulmonary/Chest: Effort normal and breath sounds normal. No stridor. No respiratory distress. She has no wheezes. She has no rales.   Abdominal: Soft. Normal appearance, normal aorta and bowel sounds are normal. She exhibits no  distension. There is no abdominal tenderness.   Musculoskeletal:         General: No tenderness or edema.   Lymphadenopathy:     She has no cervical adenopathy.   Neurological: She is alert and oriented to person, place, and time. She has normal strength and normal reflexes. She exhibits normal muscle tone.   Skin: Skin is warm. No rash noted. She is not diaphoretic. No erythema. No pallor.   Psychiatric: She has a normal mood and affect. Her speech is normal and behavior is normal. Judgment and thought content normal. Cognition and memory are normal.   Nursing note and vitals reviewed.      Lab Results   Component Value Date    WBC 6.59 08/25/2020    HGB 10.7 (L) 08/25/2020    HCT 37.1 08/25/2020    MCV 94 08/25/2020     (H) 08/25/2020    CO2 26 08/25/2020    CREATININE 1.3 08/25/2020    CALCIUM 9.8 08/25/2020    ALBUMIN 4.1 08/25/2020    AST 15 08/25/2020    BNP 49 08/25/2020    ALT 13 08/25/2020       Lab Results   Component Value Date    INR 1.2 12/23/2019    INR 1.4 (H) 12/22/2019    INR 1.3 (H) 12/21/2019       BNP   Date Value Ref Range Status   08/25/2020 49 0 - 99 pg/mL Final     Comment:     Values of less than 100 pg/ml are consistent with non-CHF populations.   07/27/2020 51 0 - 99 pg/mL Final     Comment:     Values of less than 100 pg/ml are consistent with non-CHF populations.   05/18/2020 48 0 - 99 pg/mL Final     Comment:     Values of less than 100 pg/ml are consistent with non-CHF populations.       LD   Date Value Ref Range Status   12/16/2019 240 110 - 260 U/L Final     Comment:     Results are increased in hemolyzed samples.   12/15/2019 246 110 - 260 U/L Final     Comment:     Results are increased in hemolyzed samples.   12/14/2019 257 110 - 260 U/L Final     Comment:     Results are increased in hemolyzed samples.       Tacrolimus Lvl   Date Value Ref Range Status   08/25/2020 9.5 5.0 - 15.0 ng/mL Final     Comment:     Testing performed by Liquid Chromatography-Tandem  Mass  Spectrometry (LC-MS/MS).  This test was developed and its performance characteristics  determined by [facility], Department of Pathology  and Laboratory Medicine in a manner consistent with CLIA  requirements. It has not been cleared or approved by the [place]  Food and Drug Administration.  This test is used for clinical   purposes.  It should not be regarded as investigational or for  research.         Assessment:     1. Tremor    2. Heart transplanted    3. 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 Thenar muscle atrophy of left hand    5. Thenar muscle atrophy of right hand        Plan:     Problem List Items Addressed This Visit     Heart transplanted    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    Tremor - Primary    Thenar muscle atrophy of left hand    Thenar muscle atrophy of right hand        Awaiting EMG    Recovered well from lymphocele removal, glad to see she is moving well and better.     Continue PT/OT    PET due when can be scheduled to evaluate for sarcoidosis. Remains on prednisone 5mg for the first year at the least.   Return instructions as set forth by post transplant schedule or as needed:    Clinic: Return for labs and/or biopsy weekly the first month, every two weeks during month 2 and then monthly for the first year at the provider or coordinator's discretion. During the second year, return to clinic every 3 months. Post transplant year 3-5 return every 6 months. There will be a comprehensive post transplant evaluation every year that may include LHC/RHC/biopsy, stress test, echo, CXR, and other health screening exams.    In addition to the clinical assessment, I have ordered Allomap testing for this patient to assist in identification of moderate/severe acute cellular rejection (ACR) in a pt with stable Allograft function instead of endomyocardial biopsy.     Patient is reminded to call with any health changes as these  can be early signs of transplant complications. Patient is advised to make sure any new medications or changes of old medications are discussed with a pharmacist or physician knowledgeable with transplant to avoid rejection/drug toxicity related to significant drug interactions.    UNOS Patient Status  Functional Status: 90% - Able to carry on normal activity: minor symptoms of disease  Physical Capacity: No Limitations  Working for Income: No  If no, reason not working: Demands of Treatment    Johny Zarate MD

## 2020-08-31 NOTE — TELEPHONE ENCOUNTER
Sent to pt and place of work a new letter with revised hours of work to 25 hours/ week.    normal... Well appearing, well nourished, awake, alert, oriented to person, place, time/situation and in no apparent distress.

## 2020-08-31 NOTE — LETTER
[facility]  [address]  [facility] [address]  Phone: [phone]     [facility]  [address], [address]  [place] [address] [phone]  Fax: [phone]       To Whom It May Concern:      RE:  Meryl Navas      This is a letter to approve Ms. Navas increasing her hours of work to 35 hours per week beginning Sept 7, 2020. Her stamina and ability to work is improving and she is stable from a cardiac transplant standpoint.     If you have any questions, please do not hesitate to contact us.       Sincerely,         Miriam Prieto MD  Medical Director  Advanced Heart Failure and Heart Transplant   [facility]  [facility] [place]

## 2020-09-03 LAB — HEART CARE KIT (SHORE): NORMAL

## 2020-09-03 NOTE — PLAN OF CARE
Pt AAOx4, VSS on visi monitor.  2L nasal cannula utilized as needed.  SpO2 >93%.  Tele monitoring in progress, NSR to ST low 100s.  Pt denies any discomfort.  Some nausea today and decreased appetite.  PRN phenergen given q6h with some relief.  Daily weights obtained in the am.  VBG in the am and CVPs.  Lasix gtt discontinued early this am after bump in SCr.  Urine output ~ 900 cc thus far this shift. No BM today.  H&H stable at this time.  No orders for blood.  Unit given early this am with some improvement in H&H.  Pt independent and able to ambulate to the bathroom with some SOB.  Pt instructed to call nurse with SOB or any assistance if needed.  Consult placed to IR for possible pleural evacuation on Monday.  Monitoring at this time.  Pt free from falls/injuries/acute events. Fall precautions in place.  Nonskid socks on feet, bed lowered and locked, call bell in reach.  See flowsheet for further assessment findings. Will monitor.   03-Sep-2020

## 2020-09-11 ENCOUNTER — TELEPHONE (OUTPATIENT)
Dept: CARDIOLOGY | Facility: CLINIC | Age: 50
End: 2020-09-11

## 2020-09-11 DIAGNOSIS — Z94.1 STATUS POST HEART TRANSPLANT: Primary | ICD-10-CM

## 2020-09-11 NOTE — TELEPHONE ENCOUNTER
----- Message from James Barrera sent at 9/11/2020  2:55 PM CDT -----  Regarding: PATIENT: Deborah Navas MRN:[id number]  Good evening, Patient states she was instructed to take Cellcept 1000mg/bid. If so please send new script to pharmacy. Thank You

## 2020-09-14 RX ORDER — MYCOPHENOLATE MOFETIL 250 MG/1
1000 CAPSULE ORAL 2 TIMES DAILY
Qty: 240 CAPSULE | Refills: 11 | Status: SHIPPED | OUTPATIENT
Start: 2020-09-14 | End: 2021-05-24

## 2020-09-15 ENCOUNTER — DOCUMENTATION ONLY (OUTPATIENT)
Dept: REHABILITATION | Facility: HOSPITAL | Age: 50
End: 2020-09-15

## 2020-09-15 ENCOUNTER — HOSPITAL ENCOUNTER (OUTPATIENT)
Dept: CARDIOLOGY | Facility: HOSPITAL | Age: 50
Discharge: HOME | End: 2020-09-15
Attending: INTERNAL MEDICINE
Payer: COMMERCIAL

## 2020-09-15 VITALS — BODY MASS INDEX: 33.12 KG/M2 | WEIGHT: 211 LBS | HEIGHT: 67 IN

## 2020-09-15 DIAGNOSIS — D86.9 SARCOIDOSIS: ICD-10-CM

## 2020-09-15 PROCEDURE — 78459 CV CARDIAC PET SCAN VIABILITY SARCOID: ICD-10-PCS | Mod: 26,,, | Performed by: INTERNAL MEDICINE

## 2020-09-15 PROCEDURE — 78459 MYOCRD IMG PET SINGLE STUDY: CPT | Mod: 26,,, | Performed by: INTERNAL MEDICINE

## 2020-09-15 PROCEDURE — 78429 MYOCRD IMG PET 1 STD W/CT: CPT

## 2020-09-15 NOTE — PROGRESS NOTES
Outpatient Therapy Discharge Summary     Name: Deborah Oliva [facility] Number: [id number]    Therapy Diagnosis: B hand weakness  Physician: Mriiam Prieto MD    Physician Orders: OT eval and treat  Medical Diagnosis: tremors of the nervous system  Evaluation Date: 7/13/2020    Date of Last visit: 8/5/2020  Total Visits Received: 6  Cancelled Visits: 0  No Show Visits: 0    Assessment      Unable to formally assess goals - pt did not return to therapy    Goals:   Short Term Goals: (4 weeks)  1. Pt will improve opposition strength of B thumbs to 4/5 from 3/5 to improve functional grasp when washing dishes.  2. Pt will improve functional lateral pinch strength of L hand needed for managing buttons 3# from 1#.  3. Pt will improve functional lateral pinch strength of R hand needed for handwriting tasks to 6# from 4#.  4. Pt will improve functional tip pinch strength of B hands needed for typing to 2# from 0.5#.  5. Pt will be Independent with HEP to improve strength and FM skills.      Long Term Goals: (by discharge)  1. Pt will report a decreased limitation score on the QuickDASH assessment of less than 30% by discharge.  2. Pt will improve functional lateral pinch strength of L hand needed for managing buttons 5# from 1#.  3. Pt will improve functional lateral pinch strength of R hand needed for handwriting tasks to 7# from 4#.  4. Pt will improve functional tip pinch strength of B hands needed for typing to 4# from 0.5#.    Discharge reason: Patient has not attended therapy since 8/5/2020.    Plan   This patient is discharged from Occupational Therapy

## 2020-09-17 ENCOUNTER — PATIENT MESSAGE (OUTPATIENT)
Dept: TRANSPLANT | Facility: CLINIC | Age: 50
End: 2020-09-17

## 2020-09-17 ENCOUNTER — OFFICE VISIT (OUTPATIENT)
Dept: PHYSICAL MEDICINE AND REHAB | Facility: CLINIC | Age: 50
End: 2020-09-17
Payer: COMMERCIAL

## 2020-09-17 VITALS
DIASTOLIC BLOOD PRESSURE: 103 MMHG | WEIGHT: 208.88 LBS | BODY MASS INDEX: 32.79 KG/M2 | RESPIRATION RATE: 16 BRPM | HEART RATE: 117 BPM | SYSTOLIC BLOOD PRESSURE: 167 MMHG | HEIGHT: 67 IN

## 2020-09-17 DIAGNOSIS — G56.23 CUBITAL TUNNEL SYNDROME, BILATERAL: ICD-10-CM

## 2020-09-17 PROCEDURE — 3008F PR BODY MASS INDEX (BMI) DOCUMENTED: ICD-10-PCS | Mod: CPTII,S$GLB,, | Performed by: PHYSICAL MEDICINE & REHABILITATION

## 2020-09-17 PROCEDURE — 99204 OFFICE O/P NEW MOD 45 MIN: CPT | Mod: 25,S$GLB,, | Performed by: PHYSICAL MEDICINE & REHABILITATION

## 2020-09-17 PROCEDURE — 99999 PR PBB SHADOW E&M-EST. PATIENT-LVL III: ICD-10-PCS | Mod: PBBFAC,,, | Performed by: PHYSICAL MEDICINE & REHABILITATION

## 2020-09-17 PROCEDURE — 95911 NRV CNDJ TEST 9-10 STUDIES: CPT | Mod: S$GLB,,, | Performed by: PHYSICAL MEDICINE & REHABILITATION

## 2020-09-17 PROCEDURE — 3080F PR MOST RECENT DIASTOLIC BLOOD PRESSURE >= 90 MM HG: ICD-10-PCS | Mod: CPTII,S$GLB,, | Performed by: PHYSICAL MEDICINE & REHABILITATION

## 2020-09-17 PROCEDURE — 3080F DIAST BP >= 90 MM HG: CPT | Mod: CPTII,S$GLB,, | Performed by: PHYSICAL MEDICINE & REHABILITATION

## 2020-09-17 PROCEDURE — 95911 PR NERVE CONDUCTION STUDY; 9-10 STUDIES: ICD-10-PCS | Mod: S$GLB,,, | Performed by: PHYSICAL MEDICINE & REHABILITATION

## 2020-09-17 PROCEDURE — 99204 PR OFFICE/OUTPT VISIT, NEW, LEVL IV, 45-59 MIN: ICD-10-PCS | Mod: 25,S$GLB,, | Performed by: PHYSICAL MEDICINE & REHABILITATION

## 2020-09-17 PROCEDURE — 99999 PR PBB SHADOW E&M-EST. PATIENT-LVL III: CPT | Mod: PBBFAC,,, | Performed by: PHYSICAL MEDICINE & REHABILITATION

## 2020-09-17 PROCEDURE — 3077F SYST BP >= 140 MM HG: CPT | Mod: CPTII,S$GLB,, | Performed by: PHYSICAL MEDICINE & REHABILITATION

## 2020-09-17 PROCEDURE — 3008F BODY MASS INDEX DOCD: CPT | Mod: CPTII,S$GLB,, | Performed by: PHYSICAL MEDICINE & REHABILITATION

## 2020-09-17 PROCEDURE — 3077F PR MOST RECENT SYSTOLIC BLOOD PRESSURE >= 140 MM HG: ICD-10-PCS | Mod: CPTII,S$GLB,, | Performed by: PHYSICAL MEDICINE & REHABILITATION

## 2020-09-17 NOTE — PROGRESS NOTES
[facility]   [address] [address] [address]  [place] [address]  Phone: [phone]        Full Name: Meryl Navas YOB: 1970  Patient ID: [id number]`      Visit Date: 9/17/2020 15:26  Age: 49 Years 11 Months Old  Examining Physician: Cristin Camejo M.D.  Referring Physician: Dr Vasques/Dr Singer  Reason for Referral: hand weakness      Chief Complaint   Patient presents with    Weakness     hands     HPI: This is a 49 y.o.  female being seen in clinic today for evaluation of bilateral hand weakness and numbness in her 4th and 5th digits.  Her symptoms have bothered her since her heart transplant and hospitalization (possible positioning).  She has associated tremor/shakes due to medications, but she has abnormal sensations in her fingers and mild muscle wasting in her hands. Wearing wrist braces and resting her hands provides some relief.  She has tried therapy as well which has helped somewhat.    History obtained from patient    Past family, medical, social, and surgical history reviewed in chart    Review of Systems:     General- denies lethargy, weight change, fever, chills  Head/neck- denies swallowing difficulties  ENT- denies hearing changes  Cardiovascular-+CHF  Pulmonary- denies shortness of breath  GI- denies constipation or bowel incontinence  -+CKD  Skin- denies wounds or rashes  Musculoskeletal- +weakness, +pain  Neurologic- +numbness and tingling  Psychiatric- denies depressive or psychotic features, denies anxiety  Lymphatic-denies swelling  Endocrine- denies hypoglycemic symptoms/DM history  All other pertinent systems negative     Physical Examination:  General: Well developed, well nourished female, NAD  HEENT:NCAT EOMI bilaterally   Pulmonary:Normal respirations    Spinal Examination: CERVICAL  Active ROM is within normal limits.  Inspection: No deformity of spinal alignment.    Musculoskeletal Tests:  Phalen: neg  Elbow compression (ulnar): neg  Tinels at wrist:  neg    Bilateral Upper and Lower Extremities:  Pulses are 2+ at radial bilaterally.  Shoulder/Elbow/Wrist/Hand ROM wnl  Hip/Knee/Ankle ROM   Bilateral Extremities show normal capillary refill.  No signs of cyanosis, rubor, edema, skin changes, or dysvascular changes of appendages.  Nails appear intact.    Neurological Exam:  Cranial Nerves:  II-XII grossly intact    Manual Muscle Testing: (Motor 5=normal)  5/5 strength bilateral upper extremities except 4/5 interossei     No focal atrophy is noted of either upper extremity except bilateral hand intrinsic muscles-worse at bilateral FDI    Bilateral Reflexes:hypo at bic tric br  Meadows's response is absent bilaterally.    Sensation: tested to light touch  - intact in arms except dec at 5th digits and hypothenar eminence   Gait: Narrow base and good arm swing.      Entire procedure explained to patient prior to proceeding.  Verbal consent obtained        SNC      Nerve / Sites Rec. Site Onset Lat Peak Lat Amp Segments Distance Velocity     ms ms µV  mm m/s   R Median - Digit II (Antidromic)      Wrist Dig II 2.7 3.6 23.8 Wrist - Dig  52   L Median - Digit II (Antidromic)      Wrist Dig II 2.5 3.2 22.2 Wrist - Dig  56   R Ulnar - Digit V (Antidromic)      Wrist Dig V 3.4 4.7 12.1 Wrist - Dig V 140 41   L Ulnar - Digit V (Antidromic)      Wrist Dig V 2.2 3.8 7.4 Wrist - Dig V 140 63   R Radial - Anatomical snuff box (Forearm)      Forearm Wrist 1.1 1.9 21.1 Forearm - Wrist 100 87   L Radial - Anatomical snuff box (Forearm)      Forearm Wrist 1.8 2.6 15.8 Forearm - Wrist 100 56       MNC      Nerve / Sites Muscle Latency Amplitude Duration Rel Amp Segments Distance Lat Diff Velocity     ms mV ms %  mm ms m/s   R Median - APB      Wrist APB 3.3 12.6 7.4 100 Wrist - APB 80        Elbow APB 8.0 7.5 7.5 60 Elbow - Wrist 240 4.7 51   L Median - APB      Wrist APB 2.9 9.8 8.0 100 Wrist - APB 80        Elbow APB 7.1 9.8 8.0 100 Elbow - Wrist 230 4.3 54   R Ulnar -  ADM      Wrist ADM 3.1 2.7 8.0 100 Wrist - ADM 80        B.Elbow ADM 7.0 2.5 5.7 95.8 B.Elbow - Wrist 220 3.9 57      A.Elbow ADM 10.2 2.4  95.7 A.Elbow - B.Elbow 120 3.2 38         A.Elbow - Wrist  7.0    L Ulnar - ADM      Wrist ADM 3.1 4.6 6.1 100 Wrist - ADM 80        B.Elbow ADM 7.4 4.6 7.1 100 B.Elbow - Wrist 220 4.3 51      A.Elbow ADM 10.4 4.6 7.6 101 A.Elbow - B.Elbow 120 3.0 40         A.Elbow - Wrist  7.3                                            INTERPRETATION  -Bilateral median motor nerve conduction study showed normal latency, amplitude, and conduction velocity  -Bilateral median sensory nerve conduction study showed normal peak latency and amplitude  -Bilateral ulnar motor nerve conduction study showed normal latency, dec amplitude, and dec conduction velocity across the elbows  -Bilateral ulnar sensory nerve conduction study showed prolonged peak latency on right and dec amplitude  -Bilateral radial sensory nerve conduction study showed normal peak latency and amplitude      IMPRESSION  1. ABNORMAL study  2. There is electrodiagnostic evidence of a MODERATE-SEVERE demyelinating and axonal ulnar neuropathy (Cubital tunnel syndrome) across BILATERAL elbows-slightly worse on the RIGHT    PLAN  1. Follow up with referring provider: Dr. Rhonda Robin*/Dr Vasques  2. Handouts on cubital tunnel provided. Cont OT and fu with ortho to discuss  3. This study is good for one year. If symptoms worsen or do not improve, please re-consult.    Cristin Camejo M.D.  Physical Medicine and Rehab

## 2020-09-17 NOTE — LETTER
September 17, 2020      Rhonda Singer MD  [address]  [facility] [address]           [facility]  [address] [facility]  [place] [address]  Phone: [phone]  Fax: [phone]          Patient: Deborah Navas   MR Number: [id number]   YOB: 1970   Date of Visit: 9/17/2020       Dear Dr. Rhonda Singer:    Thank you for referring Deborah Navas to me for evaluation. Attached you will find relevant portions of my assessment and plan of care.    If you have questions, please do not hesitate to call me. I look forward to following Deborah Navas along with you.    Sincerely,    Cristin Camejo MD    Enclosure  CC:  No Recipients    If you would like to receive this communication electronically, please contact [email or web address] or [phone] to request more information on Known Link access.    For providers and/or their staff who would like to refer a patient to [facility], please contact us through our one-stop-shop provider referral line, [facility], at [phone].    If you feel you have received this communication in error or would no longer like to receive these types of communications, please e-mail [email or web address]

## 2020-09-17 NOTE — PATIENT INSTRUCTIONS
What Is Cubital Tunnel Syndrome?  Cubital tunnel syndrome is a set of symptoms that may occur if the ulnar nerve in your elbow gets pinched. This may happen if you bend or lean on your elbows often.    Your cubital tunnel  The cubital tunnel is a groove in a bone near your elbow. This narrow groove provides a passage for the ulnar nerve, one of the main nerves in your arm. The ulnar nerve can cause funny bone pain if your elbow gets bumped. Your cubital tunnel helps protect this nerve as it passes through your elbow and down to your fingers.  Compressing the ulnar nerve  Bending your elbow compresses the ulnar nerve inside the cubital tunnel. The nerve can get inflamed (irritated) after constant bending and pinching or after getting hurt. Over time, this can lead to pain or numbness. The pain is often felt in your ring fingers and little fingers.     Bending your elbow as you hold a phone can cause problems over time.    What are its symptoms?  · Numbness or tingling in the ring fingers and little fingers  · Loss of finger or hand strength  · Inability to straighten fingers  · Sharp, sudden pain when elbow is touched  The road to healing  You can keep cubital tunnel syndrome from flaring up. Avoid pinching the ulnar nerve by keeping your arm straight as much as you can, even while sleeping. And use phone headsets and elbow pads. If you still have pain, tell your doctor.   Date Last Reviewed: 9/8/2015  © 4813-9715 [employer]. [address] [place] [address], [place] [address]. All rights reserved. This information is not intended as a substitute for professional medical care. Always follow your healthcare professional's instructions.        Ulnar Nerve Palsy  The ulnar nerve travels down the arm from the shoulder to the hand. It controls movement and feeling in the wrist and hand. Damage to this nerve can cause a condition called ulnar nerve palsy.  A common cause of ulnar nerve palsy is leaning on the  "elbow for long periods of time. Injury to the arm or elbow, such as a fracture, can also damage the ulnar nerve.  Symptoms of ulnar nerve palsy include:  · Numbness, tingling, or burning (often described as "pins and needles")  · Pain  · Weakness in the hand and fingers  The treatment depends on the cause of the nerve damage. In some cases, the problem will go away on its once pressure to the nerve is relieved. Splinting the wrist to limit movement may help with healing. If the problem is due to trauma or injury, physical therapy may be prescribed. Corticosteroids injections into the area may reduce swelling and pressure on the nerve. Surgery to repair the nerve may be needed for chronic symptoms that do not respond to simpler treatments.  Home care  · Rest the wrist and arm until normal feeling and strength return.  · If you were given a splint or sling, wear it as directed.  · Avoid positions leaning on your elbows.  · If medicines were prescribed for pain or nerve sensations, take them as directed.  Follow-up care  Follow up with your healthcare provider or as advised by our staff.  When to seek medical advice  Call your healthcare provider right away if you have any of the following:  · Increasing arm swelling or pain  · Numbness or weakness of the arm  · Symptoms spread to other parts of the body  · Slurred speech, confusion  · Trouble speaking, walking, or seeing  Date Last Reviewed: 9/25/2015 © 2000-2017 [employer]. [address] [place] [address]. All rights reserved. This information is not intended as a substitute for professional medical care. Always follow your healthcare professional's instructions.        "

## 2020-09-21 ENCOUNTER — TELEPHONE (OUTPATIENT)
Dept: ORTHOPEDICS | Facility: CLINIC | Age: 50
End: 2020-09-21

## 2020-09-21 NOTE — TELEPHONE ENCOUNTER
Spoke to patient she stated she had her emg done on 9/17/20.I reminded her of her 9/22/20 appointment.The patient appreciated the phone call.

## 2020-09-22 ENCOUNTER — OFFICE VISIT (OUTPATIENT)
Dept: ORTHOPEDICS | Facility: CLINIC | Age: 50
End: 2020-09-22
Payer: COMMERCIAL

## 2020-09-22 VITALS
BODY MASS INDEX: 32.65 KG/M2 | WEIGHT: 208 LBS | DIASTOLIC BLOOD PRESSURE: 81 MMHG | HEIGHT: 67 IN | SYSTOLIC BLOOD PRESSURE: 110 MMHG | HEART RATE: 94 BPM

## 2020-09-22 DIAGNOSIS — G56.23 ULNAR NEUROPATHY OF BOTH UPPER EXTREMITIES: Primary | ICD-10-CM

## 2020-09-22 DIAGNOSIS — Z01.818 PRE-OP TESTING: Primary | ICD-10-CM

## 2020-09-22 PROCEDURE — 3074F SYST BP LT 130 MM HG: CPT | Mod: CPTII,S$GLB,, | Performed by: ORTHOPAEDIC SURGERY

## 2020-09-22 PROCEDURE — 3079F PR MOST RECENT DIASTOLIC BLOOD PRESSURE 80-89 MM HG: ICD-10-PCS | Mod: CPTII,S$GLB,, | Performed by: ORTHOPAEDIC SURGERY

## 2020-09-22 PROCEDURE — 3074F PR MOST RECENT SYSTOLIC BLOOD PRESSURE < 130 MM HG: ICD-10-PCS | Mod: CPTII,S$GLB,, | Performed by: ORTHOPAEDIC SURGERY

## 2020-09-22 PROCEDURE — 3008F PR BODY MASS INDEX (BMI) DOCUMENTED: ICD-10-PCS | Mod: CPTII,S$GLB,, | Performed by: ORTHOPAEDIC SURGERY

## 2020-09-22 PROCEDURE — 3079F DIAST BP 80-89 MM HG: CPT | Mod: CPTII,S$GLB,, | Performed by: ORTHOPAEDIC SURGERY

## 2020-09-22 PROCEDURE — 99999 PR PBB SHADOW E&M-EST. PATIENT-LVL IV: ICD-10-PCS | Mod: PBBFAC,,, | Performed by: ORTHOPAEDIC SURGERY

## 2020-09-22 PROCEDURE — 99999 PR PBB SHADOW E&M-EST. PATIENT-LVL IV: CPT | Mod: PBBFAC,,, | Performed by: ORTHOPAEDIC SURGERY

## 2020-09-22 PROCEDURE — 99214 PR OFFICE/OUTPT VISIT, EST, LEVL IV, 30-39 MIN: ICD-10-PCS | Mod: S$GLB,,, | Performed by: ORTHOPAEDIC SURGERY

## 2020-09-22 PROCEDURE — 99214 OFFICE O/P EST MOD 30 MIN: CPT | Mod: S$GLB,,, | Performed by: ORTHOPAEDIC SURGERY

## 2020-09-22 PROCEDURE — 3008F BODY MASS INDEX DOCD: CPT | Mod: CPTII,S$GLB,, | Performed by: ORTHOPAEDIC SURGERY
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
## 2020-09-22 NOTE — PROGRESS NOTES
I have personally taken the history and examined the patient. I agree with the Hand Surgery PA's note. The plan will be cubital and guyon release.  Patient has significant interosseous wasting right side also on the left but the right is much worse 1st dorsal interosseous significantly weak and atrophied patient unable to cross index over long difficulty with separation of her fingers    Plan we discussed surgical release we did discuss her EMG nerve conduction study in great detail we also discussed reduction for recovery at this time is not a production that muscle returned however we did state that it is unknown nerves the once decompressed.  The nerve to recover on its own patient understands agrees with this plan patient states the symptoms started in December its this point which she had significant interosseous wasting
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
## 2020-09-23 DIAGNOSIS — G56.23 ULNAR NEUROPATHY OF BOTH UPPER EXTREMITIES: Primary | ICD-10-CM

## 2020-09-24 ENCOUNTER — TELEPHONE (OUTPATIENT)
Dept: TRANSPLANT | Facility: CLINIC | Age: 50
End: 2020-09-24

## 2020-09-24 ENCOUNTER — PATIENT MESSAGE (OUTPATIENT)
Dept: TRANSPLANT | Facility: CLINIC | Age: 50
End: 2020-09-24

## 2020-09-24 NOTE — TELEPHONE ENCOUNTER
----- Message from Miriam Prieto MD sent at 9/24/2020 12:30 PM CDT -----  Hello,    We understand Ms. Navas is going to be undergoing hand surgery for there current wasting that is going bilaterally, with one hand worse than the other.     We typically wait until the patient gets to 1 year post-transplant, which for her would be 12/16/19, in the event there are possibilities of complications that can be absorbed better if they are after 1 year post-OHT. But we know how significant this issue is, and wanted to confirm that this should not wait until December?     Additionally, what anesthesia would be involved in this surgery? So we can adequately give a cardiac risk stratification based on what will be done, unfortunately she will remain on prednisone 5mg daily, due to her sarcoidosis, wanted to make sure that would be okay given her other immunosuppression as well.     Thank you for your help and for seeing her so quickly!,    Sincerely,  Miriam Prieto

## 2020-09-25 ENCOUNTER — TELEPHONE (OUTPATIENT)
Dept: TRANSPLANT | Facility: CLINIC | Age: 50
End: 2020-09-25

## 2020-09-25 ENCOUNTER — TELEPHONE (OUTPATIENT)
Dept: ORTHOPEDICS | Facility: CLINIC | Age: 50
End: 2020-09-25

## 2020-09-25 NOTE — TELEPHONE ENCOUNTER
Sent a response to Dr Vasques to continue with surgery plans. Will send a note to pt. We will be seeing her next week.     ----- Message from Shelly Vasques MD sent at 9/25/2020  9:43 AM CDT -----  HI! Thanks for the response. The unfortunate issue is that she has had significant muscle atrophy in her hand since December and getting worse. Per report by pt is that this started in December and rapidly progressed. We can wait but she is losing active hand function that can not be reversed- the longer she waits the more atrophy occurring. I will depend on your judgement about waiting vs proceeding with caution. It is a general anesthesia for this procedure.   Shelly  ----- Message -----  From: Miriam Prieto MD  Sent: 9/24/2020  12:30 PM CDT  To: Shelly Vasques MD, #    Hello,    We understand Ms. Navas is going to be undergoing hand surgery for there current wasting that is going bilaterally, with one hand worse than the other.     We typically wait until the patient gets to 1 year post-transplant, which for her would be 12/16/19, in the event there are possibilities of complications that can be absorbed better if they are after 1 year post-OHT. But we know how significant this issue is, and wanted to confirm that this should not wait until December?     Additionally, what anesthesia would be involved in this surgery? So we can adequately give a cardiac risk stratification based on what will be done, unfortunately she will remain on prednisone 5mg daily, due to her sarcoidosis, wanted to make sure that would be okay given her other immunosuppression as well.     Thank you for your help and for seeing her so quickly!,    Sincerely,  Miriam Prieto

## 2020-09-25 NOTE — TELEPHONE ENCOUNTER
Dr Johny Zarate, pt was seen in our office on 9/22/20 and is scheduled for Ulnar Nerve Decompression of Right Wrist and Elbow on 10/9/20. She takes Baby ASA daily. Should this medicine be held prior to surgery, and if so, what time frame?

## 2020-09-28 ENCOUNTER — LAB VISIT (OUTPATIENT)
Dept: LAB | Facility: HOSPITAL | Age: 50
End: 2020-09-28
Attending: INTERNAL MEDICINE
Payer: COMMERCIAL

## 2020-09-28 ENCOUNTER — PATIENT MESSAGE (OUTPATIENT)
Dept: TRANSPLANT | Facility: CLINIC | Age: 50
End: 2020-09-28

## 2020-09-28 ENCOUNTER — HOSPITAL ENCOUNTER (OUTPATIENT)
Dept: CARDIOLOGY | Facility: HOSPITAL | Age: 50
Discharge: HOME | End: 2020-09-28
Attending: INTERNAL MEDICINE
Payer: COMMERCIAL

## 2020-09-28 ENCOUNTER — ANESTHESIA EVENT (OUTPATIENT)
Dept: SURGERY | Facility: HOSPITAL | Age: 50
End: 2020-09-28
Payer: COMMERCIAL

## 2020-09-28 VITALS — WEIGHT: 208 LBS | BODY MASS INDEX: 32.65 KG/M2 | HEIGHT: 67 IN

## 2020-09-28 DIAGNOSIS — Z79.899 ENCOUNTER FOR LONG-TERM (CURRENT) USE OF MEDICATIONS: ICD-10-CM

## 2020-09-28 DIAGNOSIS — T86.298 OTHER COMPLICATION OF HEART TRANSPLANT: ICD-10-CM

## 2020-09-28 DIAGNOSIS — Z94.1 STATUS POST HEART TRANSPLANT: ICD-10-CM

## 2020-09-28 DIAGNOSIS — N28.9 RENAL INSUFFICIENCY: ICD-10-CM

## 2020-09-28 LAB
ALBUMIN SERPL BCP-MCNC: 4.2 G/DL (ref 3.5–5.2)
ALP SERPL-CCNC: 68 U/L (ref 55–135)
ALT SERPL W/O P-5'-P-CCNC: 10 U/L (ref 10–44)
ANION GAP SERPL CALC-SCNC: 11 MMOL/L (ref 8–16)
AORTIC ROOT ANNULUS: 2.85 CM
AST SERPL-CCNC: 14 U/L (ref 10–40)
AV INDEX (PROSTH): 0.6
AV MEAN GRADIENT: 6 MMHG
AV PEAK GRADIENT: 12 MMHG
AV VALVE AREA: 1.81 CM2
AV VELOCITY RATIO: 0.57
BASOPHILS # BLD AUTO: 0.04 K/UL (ref 0–0.2)
BASOPHILS NFR BLD: 0.6 % (ref 0–1.9)
BILIRUB SERPL-MCNC: 0.4 MG/DL (ref 0.1–1)
BNP SERPL-MCNC: 60 PG/ML (ref 0–99)
BSA FOR ECHO PROCEDURE: 2.11 M2
BUN SERPL-MCNC: 32 MG/DL (ref 6–20)
CALCIUM SERPL-MCNC: 10 MG/DL (ref 8.7–10.5)
CHLORIDE SERPL-SCNC: 104 MMOL/L (ref 95–110)
CO2 SERPL-SCNC: 27 MMOL/L (ref 23–29)
CREAT SERPL-MCNC: 1.4 MG/DL (ref 0.5–1.4)
CV ECHO LV RWT: 0.62 CM
DIFFERENTIAL METHOD: ABNORMAL
DOP CALC AO PEAK VEL: 1.71 M/S
DOP CALC AO VTI: 31.64 CM
DOP CALC LVOT AREA: 3 CM2
DOP CALC LVOT DIAMETER: 1.96 CM
DOP CALC LVOT PEAK VEL: 0.97 M/S
DOP CALC LVOT STROKE VOLUME: 57.42 CM3
DOP CALC RVOT PEAK VEL: 0.7 M/S
DOP CALC RVOT VTI: 16.56 CM
DOP CALCLVOT PEAK VEL VTI: 19.04 CM
E WAVE DECELERATION TIME: 249.11 MSEC
E/A RATIO: 1.01
E/E' RATIO: 7.82 M/S
ECHO LV POSTERIOR WALL: 1.24 CM (ref 0.6–1.1)
EOSINOPHIL # BLD AUTO: 0.1 K/UL (ref 0–0.5)
EOSINOPHIL NFR BLD: 2 % (ref 0–8)
ERYTHROCYTE [DISTWIDTH] IN BLOOD BY AUTOMATED COUNT: 14.7 % (ref 11.5–14.5)
EST. GFR  (AFRICAN AMERICAN): 50.5 ML/MIN/1.73 M^2
EST. GFR  (NON AFRICAN AMERICAN): 43.8 ML/MIN/1.73 M^2
FRACTIONAL SHORTENING: 31 % (ref 28–44)
GLUCOSE SERPL-MCNC: 93 MG/DL (ref 70–110)
HCT VFR BLD AUTO: 40 % (ref 37–48.5)
HGB BLD-MCNC: 11.7 G/DL (ref 12–16)
IMM GRANULOCYTES # BLD AUTO: 0.02 K/UL (ref 0–0.04)
IMM GRANULOCYTES NFR BLD AUTO: 0.3 % (ref 0–0.5)
INTERVENTRICULAR SEPTUM: 1.08 CM (ref 0.6–1.1)
LEFT INTERNAL DIMENSION IN SYSTOLE: 2.75 CM (ref 2.1–4)
LEFT VENTRICLE DIASTOLIC VOLUME INDEX: 33.42 ML/M2
LEFT VENTRICLE DIASTOLIC VOLUME: 68.71 ML
LEFT VENTRICLE MASS INDEX: 76 G/M2
LEFT VENTRICLE SYSTOLIC VOLUME INDEX: 13.8 ML/M2
LEFT VENTRICLE SYSTOLIC VOLUME: 28.28 ML
LEFT VENTRICULAR INTERNAL DIMENSION IN DIASTOLE: 3.97 CM (ref 3.5–6)
LEFT VENTRICULAR MASS: 155.59 G
LV LATERAL E/E' RATIO: 6.62 M/S
LV SEPTAL E/E' RATIO: 9.56 M/S
LYMPHOCYTES # BLD AUTO: 0.8 K/UL (ref 1–4.8)
LYMPHOCYTES NFR BLD: 11.7 % (ref 18–48)
MAGNESIUM SERPL-MCNC: 1.8 MG/DL (ref 1.6–2.6)
MCH RBC QN AUTO: 27.1 PG (ref 27–31)
MCHC RBC AUTO-ENTMCNC: 29.3 G/DL (ref 32–36)
MCV RBC AUTO: 93 FL (ref 82–98)
MONOCYTES # BLD AUTO: 0.5 K/UL (ref 0.3–1)
MONOCYTES NFR BLD: 8.1 % (ref 4–15)
MV PEAK A VEL: 0.85 M/S
MV PEAK E VEL: 0.86 M/S
NEUTROPHILS # BLD AUTO: 5 K/UL (ref 1.8–7.7)
NEUTROPHILS NFR BLD: 77.3 % (ref 38–73)
NRBC BLD-RTO: 0 /100 WBC
PISA TR MAX VEL: 2.64 M/S
PLATELET # BLD AUTO: 429 K/UL (ref 150–350)
PMV BLD AUTO: 9.8 FL (ref 9.2–12.9)
POTASSIUM SERPL-SCNC: 4.2 MMOL/L (ref 3.5–5.1)
PROT SERPL-MCNC: 6.8 G/DL (ref 6–8.4)
PULM VEIN S/D RATIO: 2.34
PV MEAN GRADIENT: 1.06 MMHG
PV PEAK D VEL: 0.32 M/S
PV PEAK S VEL: 0.75 M/S
PV PEAK VELOCITY: 1.01 CM/S
RA PRESSURE: 3 MMHG
RBC # BLD AUTO: 4.31 M/UL (ref 4–5.4)
SINUS: 2.36 CM
SODIUM SERPL-SCNC: 142 MMOL/L (ref 136–145)
STJ: 2.1 CM
TDI LATERAL: 0.13 M/S
TDI SEPTAL: 0.09 M/S
TDI: 0.11 M/S
TR MAX PG: 28 MMHG
TV REST PULMONARY ARTERY PRESSURE: 31 MMHG
WBC # BLD AUTO: 6.51 K/UL (ref 3.9–12.7)

## 2020-09-28 PROCEDURE — 93306 TTE W/DOPPLER COMPLETE: CPT

## 2020-09-28 PROCEDURE — 80053 COMPREHEN METABOLIC PANEL: CPT

## 2020-09-28 PROCEDURE — 93306 TTE W/DOPPLER COMPLETE: CPT | Mod: 26,,, | Performed by: INTERNAL MEDICINE

## 2020-09-28 PROCEDURE — 83735 ASSAY OF MAGNESIUM: CPT

## 2020-09-28 PROCEDURE — 80197 ASSAY OF TACROLIMUS: CPT

## 2020-09-28 PROCEDURE — 36415 COLL VENOUS BLD VENIPUNCTURE: CPT

## 2020-09-28 PROCEDURE — 93306 ECHO (CUPID ONLY): ICD-10-PCS | Mod: 26,,, | Performed by: INTERNAL MEDICINE

## 2020-09-28 PROCEDURE — 83880 ASSAY OF NATRIURETIC PEPTIDE: CPT

## 2020-09-28 PROCEDURE — 85025 COMPLETE CBC W/AUTO DIFF WBC: CPT

## 2020-09-29 LAB — TACROLIMUS BLD-MCNC: 9.3 NG/ML (ref 5–15)

## 2020-09-30 ENCOUNTER — LAB VISIT (OUTPATIENT)
Dept: LAB | Facility: HOSPITAL | Age: 50
End: 2020-09-30
Payer: COMMERCIAL

## 2020-09-30 ENCOUNTER — OFFICE VISIT (OUTPATIENT)
Dept: TRANSPLANT | Facility: CLINIC | Age: 50
End: 2020-09-30
Payer: COMMERCIAL

## 2020-09-30 ENCOUNTER — TELEPHONE (OUTPATIENT)
Dept: TRANSPLANT | Facility: CLINIC | Age: 50
End: 2020-09-30

## 2020-09-30 VITALS
BODY MASS INDEX: 32.83 KG/M2 | SYSTOLIC BLOOD PRESSURE: 126 MMHG | DIASTOLIC BLOOD PRESSURE: 78 MMHG | HEIGHT: 67 IN | WEIGHT: 209.19 LBS | HEART RATE: 105 BPM

## 2020-09-30 DIAGNOSIS — Z94.1 S/P ORTHOTOPIC HEART TRANSPLANT: Primary | ICD-10-CM

## 2020-09-30 DIAGNOSIS — Z79.60 LONG-TERM USE OF IMMUNOSUPPRESSANT MEDICATION: ICD-10-CM

## 2020-09-30 DIAGNOSIS — Z94.1 HEART REPLACED BY TRANSPLANT: ICD-10-CM

## 2020-09-30 PROCEDURE — 99999 PR PBB SHADOW E&M-EST. PATIENT-LVL IV: ICD-10-PCS | Mod: PBBFAC,,, | Performed by: INTERNAL MEDICINE

## 2020-09-30 PROCEDURE — 3008F PR BODY MASS INDEX (BMI) DOCUMENTED: ICD-10-PCS | Mod: CPTII,S$GLB,, | Performed by: INTERNAL MEDICINE

## 2020-09-30 PROCEDURE — 99214 OFFICE O/P EST MOD 30 MIN: CPT | Mod: S$GLB,,, | Performed by: INTERNAL MEDICINE

## 2020-09-30 PROCEDURE — 99999 PR PBB SHADOW E&M-EST. PATIENT-LVL IV: CPT | Mod: PBBFAC,,, | Performed by: INTERNAL MEDICINE

## 2020-09-30 PROCEDURE — 99214 PR OFFICE/OUTPT VISIT, EST, LEVL IV, 30-39 MIN: ICD-10-PCS | Mod: S$GLB,,, | Performed by: INTERNAL MEDICINE

## 2020-09-30 PROCEDURE — 3074F PR MOST RECENT SYSTOLIC BLOOD PRESSURE < 130 MM HG: ICD-10-PCS | Mod: CPTII,S$GLB,, | Performed by: INTERNAL MEDICINE

## 2020-09-30 PROCEDURE — 36415 COLL VENOUS BLD VENIPUNCTURE: CPT

## 2020-09-30 PROCEDURE — 3008F BODY MASS INDEX DOCD: CPT | Mod: CPTII,S$GLB,, | Performed by: INTERNAL MEDICINE

## 2020-09-30 PROCEDURE — 3074F SYST BP LT 130 MM HG: CPT | Mod: CPTII,S$GLB,, | Performed by: INTERNAL MEDICINE

## 2020-09-30 PROCEDURE — 3078F DIAST BP <80 MM HG: CPT | Mod: CPTII,S$GLB,, | Performed by: INTERNAL MEDICINE

## 2020-09-30 PROCEDURE — 3078F PR MOST RECENT DIASTOLIC BLOOD PRESSURE < 80 MM HG: ICD-10-PCS | Mod: CPTII,S$GLB,, | Performed by: INTERNAL MEDICINE

## 2020-09-30 RX ORDER — ZOLPIDEM TARTRATE 12.5 MG/1
12.5 TABLET, FILM COATED, EXTENDED RELEASE ORAL NIGHTLY
COMMUNITY
Start: 2020-08-27

## 2020-09-30 NOTE — PROGRESS NOTES
"Subjective:   Ms. Navas is a 49 y.o. year old White female who received a donation after brain death heart transplant on 12/16/19.      CMV status:   Donor: +  Recipient:-    HPI  Ms. Navas is a very pleasant 50 y/o old WF s/p OHTx on 12/16/2019 (incidentially diagnosed with cardiac sacroid at time of explant) ,s/p BTT HM3 on 9/2019 HLD, obesity, and OA who comes for her routine clinic visit, now 9.5 months post OHT, and s/p lymphocele removal. Doing really well. Has no complaints. Echo done today showed preserved graft function with an EF=55%. Most recent cardiac PEt was negative for sarcoidosis. Her immuno regimen includes; tacro 3/3 (goal 7 to 12),cellcept 500 BID, and prednisone 5 daily.        Review of Systems   Constitution: Negative. Negative for chills, decreased appetite, diaphoresis, fever, malaise/fatigue, night sweats, weight gain and weight loss.   Eyes: Negative.    Cardiovascular: Negative for chest pain, claudication, cyanosis, dyspnea on exertion, irregular heartbeat, leg swelling, near-syncope, orthopnea, palpitations, paroxysmal nocturnal dyspnea and syncope.   Respiratory: Negative for cough, hemoptysis and shortness of breath.    Endocrine: Negative.    Hematologic/Lymphatic: Negative.    Skin: Negative for color change, dry skin and nail changes.   Musculoskeletal: Negative.    Gastrointestinal: Negative.    Genitourinary: Negative.    Neurological: Negative for weakness.       Objective:   Blood pressure 126/78, pulse 105, height 5' 7" (1.702 m), weight 94.9 kg (209 lb 3.5 oz).body mass index is 32.77 kg/m².    Physical Exam   Constitutional: She is oriented to person, place, and time. Vital signs are normal. She appears well-developed and well-nourished.   HENT:   Head: Normocephalic.   Eyes: Pupils are equal, round, and reactive to light.   Neck: Neck supple. No JVD present.   Cardiovascular: Regular rhythm and normal heart sounds. Tachycardia present. PMI is not displaced. Exam reveals " no gallop and no friction rub.   No murmur heard.  Pulmonary/Chest: Effort normal and breath sounds normal. No respiratory distress. She has no wheezes. She has no rales.   Abdominal: Soft. Bowel sounds are normal. She exhibits no distension. There is no abdominal tenderness. There is no rebound.   Musculoskeletal:         General: No edema.   Neurological: She is alert and oriented to person, place, and time.   Nursing note and vitals reviewed.      Lab Results   Component Value Date    WBC 6.51 09/28/2020    HGB 11.7 (L) 09/28/2020    HCT 40.0 09/28/2020    MCV 93 09/28/2020     (H) 09/28/2020    CO2 27 09/28/2020    CREATININE 1.4 09/28/2020    CALCIUM 10.0 09/28/2020    ALBUMIN 4.2 09/28/2020    AST 14 09/28/2020    BNP 60 09/28/2020    ALT 10 09/28/2020       Lab Results   Component Value Date    INR 1.2 12/23/2019    INR 1.4 (H) 12/22/2019    INR 1.3 (H) 12/21/2019       BNP   Date Value Ref Range Status   09/28/2020 60 0 - 99 pg/mL Final     Comment:     Values of less than 100 pg/ml are consistent with non-CHF populations.   08/25/2020 49 0 - 99 pg/mL Final     Comment:     Values of less than 100 pg/ml are consistent with non-CHF populations.   07/27/2020 51 0 - 99 pg/mL Final     Comment:     Values of less than 100 pg/ml are consistent with non-CHF populations.       LD   Date Value Ref Range Status   12/16/2019 240 110 - 260 U/L Final     Comment:     Results are increased in hemolyzed samples.   12/15/2019 246 110 - 260 U/L Final     Comment:     Results are increased in hemolyzed samples.   12/14/2019 257 110 - 260 U/L Final     Comment:     Results are increased in hemolyzed samples.       Tacrolimus Lvl   Date Value Ref Range Status   09/28/2020 9.3 5.0 - 15.0 ng/mL Final     Comment:     Testing performed by Liquid Chromatography-Tandem  Mass Spectrometry (LC-MS/MS).  This test was developed and its performance characteristics  determined by [facility], Department of  Pathology  and Laboratory Medicine in a manner consistent with CLIA  requirements. It has not been cleared or approved by the [place]  Food and Drug Administration.  This test is used for clinical   purposes.  It should not be regarded as investigational or for  research.         Assessment:     1. S/P orthotopic heart transplant    2. Long-term use of immunosuppressant medication        Plan:   Doing really well  Continue current immuno regimen   Patient is scheduled for hand surgery next month     Return instructions as set forth by post transplant schedule or as needed:    Clinic: Return for labs and/or biopsy weekly the first month, every two weeks during month 2 and then monthly for the first year at the provider or coordinator's discretion. During the second year, return to clinic every 3 months. Post transplant year 3-5 return every 6 months. There will be a comprehensive post transplant evaluation every year that may include LHC/RHC/biopsy, stress test, echo, CXR, and other health screening exams.    In addition to the clinical assessment, I have ordered Allomap testing for this patient to assist in identification of moderate/severe acute cellular rejection (ACR) in a pt with stable Allograft function instead of endomyocardial biopsy.     Patient is reminded to call with any health changes as these can be early signs of transplant complications. Patient is advised to make sure any new medications or changes of old medications are discussed with a pharmacist or physician knowledgeable with transplant to avoid rejection/drug toxicity related to significant drug interactions.    UNOS Patient Status  Functional Status: 90% - Able to carry on normal activity: minor symptoms of disease  Physical Capacity: No Limitations  Working for Income: No  If no, reason not working: Demands of Treatment    Renetta Chaudhari MD

## 2020-09-30 NOTE — TELEPHONE ENCOUNTER
Met with pt in clinic. Doing well. Feeling good. Anxiously awaiting her hand surgery next week. Reviewed meds and labs with pt. No changes made today.

## 2020-09-30 NOTE — LETTER
September 30, 2020        Joaquin Del Toro  [address]  [address]  [place] [address]  Phone: [phone]  Fax: [phone]             [id number]  [address]  [place] [address]  Phone: [phone]   Patient: Deborah Navas   MR Number: [id number]   YOB: 1970   Date of Visit: 9/30/2020       Dear Dr. Joaquin Del Toro    Thank you for referring Deborah Navas to me for evaluation. Attached you will find relevant portions of my assessment and plan of care.    If you have questions, please do not hesitate to call me. I look forward to following Deborah Navas along with you.    Sincerely,    Renetta Chaudhari MD    Enclosure    If you would like to receive this communication electronically, please contact [email or web address] or [phone] to request [employer] access.    [employer] is a tool which provides read-only access to select patient information with whom you have a relationship. Its easy to use and provides real time access to review your patients record including encounter summaries, notes, results, and demographic information.    If you feel you have received this communication in error or would no longer like to receive these types of communications, please e-mail [email or web address]

## 2020-10-02 ENCOUNTER — TELEPHONE (OUTPATIENT)
Dept: ORTHOPEDICS | Facility: CLINIC | Age: 50
End: 2020-10-02

## 2020-10-02 NOTE — TELEPHONE ENCOUNTER
Informed patient to hold blood thinners 5-7 days prior to surgery via previous messages regarding aspirin. Patient verbalized understanding.

## 2020-10-05 LAB — HEART CARE KIT (SHORE): NORMAL

## 2020-10-06 ENCOUNTER — CLINICAL SUPPORT (OUTPATIENT)
Dept: URGENT CARE | Facility: CLINIC | Age: 50
End: 2020-10-06
Payer: COMMERCIAL

## 2020-10-06 VITALS — TEMPERATURE: 99 F | HEART RATE: 80 BPM | OXYGEN SATURATION: 98 %

## 2020-10-06 DIAGNOSIS — Z01.818 PRE-OP TESTING: ICD-10-CM

## 2020-10-06 PROCEDURE — U0003 INFECTIOUS AGENT DETECTION BY NUCLEIC ACID (DNA OR RNA); SEVERE ACUTE RESPIRATORY SYNDROME CORONAVIRUS 2 (SARS-COV-2) (CORONAVIRUS DISEASE [COVID-19]), AMPLIFIED PROBE TECHNIQUE, MAKING USE OF HIGH THROUGHPUT TECHNOLOGIES AS DESCRIBED BY CMS-2020-01-R: HCPCS

## 2020-10-07 LAB — SARS-COV-2 RNA RESP QL NAA+PROBE: NOT DETECTED

## 2020-10-08 ENCOUNTER — TELEPHONE (OUTPATIENT)
Dept: ORTHOPEDICS | Facility: CLINIC | Age: 50
End: 2020-10-08

## 2020-10-08 DIAGNOSIS — Z98.890 POST-OPERATIVE STATE: Primary | ICD-10-CM

## 2020-10-08 RX ORDER — HYDROCODONE BITARTRATE AND ACETAMINOPHEN 5; 325 MG/1; MG/1
1 TABLET ORAL EVERY 6 HOURS PRN
Qty: 28 TABLET | Refills: 0 | Status: SHIPPED | OUTPATIENT
Start: 2020-10-08 | End: 2020-12-17

## 2020-10-08 NOTE — TELEPHONE ENCOUNTER
Informed patient to be at the surgery center 10/9/20 for    8:30a.m. Also reminded patient of post op appointment and xray on 10/22/20.Patient verbalized understanding.

## 2020-10-09 ENCOUNTER — HOSPITAL ENCOUNTER (OUTPATIENT)
Facility: HOSPITAL | Age: 50
Discharge: HOME | End: 2020-10-09
Attending: ORTHOPAEDIC SURGERY | Admitting: ORTHOPAEDIC SURGERY
Payer: COMMERCIAL

## 2020-10-09 ENCOUNTER — ANESTHESIA (OUTPATIENT)
Dept: SURGERY | Facility: HOSPITAL | Age: 50
End: 2020-10-09
Payer: COMMERCIAL

## 2020-10-09 ENCOUNTER — DOCUMENTATION ONLY (OUTPATIENT)
Dept: TRANSPLANT | Facility: CLINIC | Age: 50
End: 2020-10-09

## 2020-10-09 VITALS
DIASTOLIC BLOOD PRESSURE: 61 MMHG | SYSTOLIC BLOOD PRESSURE: 118 MMHG | TEMPERATURE: 98 F | HEIGHT: 67 IN | HEART RATE: 95 BPM | RESPIRATION RATE: 19 BRPM | BODY MASS INDEX: 32.8 KG/M2 | OXYGEN SATURATION: 97 % | WEIGHT: 209 LBS

## 2020-10-09 DIAGNOSIS — G56.23 CUBITAL TUNNEL SYNDROME, BILATERAL: ICD-10-CM

## 2020-10-09 DIAGNOSIS — M62.541 THENAR MUSCLE ATROPHY OF RIGHT HAND: ICD-10-CM

## 2020-10-09 DIAGNOSIS — G56.20 CUBITAL TUNNEL SYNDROME: Primary | ICD-10-CM

## 2020-10-09 DIAGNOSIS — M62.542 THENAR MUSCLE ATROPHY OF LEFT HAND: ICD-10-CM

## 2020-10-09 PROCEDURE — 25000003 PHARM REV CODE 250: Performed by: NURSE ANESTHETIST, CERTIFIED REGISTERED

## 2020-10-09 PROCEDURE — D9220A PRA ANESTHESIA: ICD-10-PCS | Mod: CRNA,,, | Performed by: NURSE ANESTHETIST, CERTIFIED REGISTERED

## 2020-10-09 PROCEDURE — D9220A PRA ANESTHESIA: ICD-10-PCS | Mod: ANES,,, | Performed by: STUDENT IN AN ORGANIZED HEALTH CARE EDUCATION/TRAINING PROGRAM

## 2020-10-09 PROCEDURE — 37000009 HC ANESTHESIA EA ADD 15 MINS: Performed by: ORTHOPAEDIC SURGERY

## 2020-10-09 PROCEDURE — 76942 ECHO GUIDE FOR BIOPSY: CPT | Mod: 26,,, | Performed by: ANESTHESIOLOGY

## 2020-10-09 PROCEDURE — D9220A PRA ANESTHESIA: Mod: ANES,,, | Performed by: STUDENT IN AN ORGANIZED HEALTH CARE EDUCATION/TRAINING PROGRAM

## 2020-10-09 PROCEDURE — D9220A PRA ANESTHESIA: Mod: CRNA,,, | Performed by: NURSE ANESTHETIST, CERTIFIED REGISTERED

## 2020-10-09 PROCEDURE — 36000707: Performed by: ORTHOPAEDIC SURGERY

## 2020-10-09 PROCEDURE — 63600175 PHARM REV CODE 636 W HCPCS: Performed by: NURSE ANESTHETIST, CERTIFIED REGISTERED

## 2020-10-09 PROCEDURE — 64415 NJX AA&/STRD BRCH PLXS IMG: CPT | Mod: 59 | Performed by: ANESTHESIOLOGY

## 2020-10-09 PROCEDURE — 64719 PR REVISE ULNAR NERVE AT WRIST: ICD-10-PCS | Mod: 51,RT,, | Performed by: ORTHOPAEDIC SURGERY

## 2020-10-09 PROCEDURE — 64718 REVISE ULNAR NERVE AT ELBOW: CPT | Mod: RT,,, | Performed by: ORTHOPAEDIC SURGERY

## 2020-10-09 PROCEDURE — 36000706: Performed by: ORTHOPAEDIC SURGERY

## 2020-10-09 PROCEDURE — 37000008 HC ANESTHESIA 1ST 15 MINUTES: Performed by: ORTHOPAEDIC SURGERY

## 2020-10-09 PROCEDURE — 64415 INFRACLAVICULAR SINGLE SHOT: ICD-10-PCS | Mod: 59,RT,, | Performed by: ANESTHESIOLOGY

## 2020-10-09 PROCEDURE — 63600175 PHARM REV CODE 636 W HCPCS: Performed by: STUDENT IN AN ORGANIZED HEALTH CARE EDUCATION/TRAINING PROGRAM

## 2020-10-09 PROCEDURE — 71000015 HC POSTOP RECOV 1ST HR: Performed by: ORTHOPAEDIC SURGERY

## 2020-10-09 PROCEDURE — 25000003 PHARM REV CODE 250: Performed by: ORTHOPAEDIC SURGERY

## 2020-10-09 PROCEDURE — 99900035 HC TECH TIME PER 15 MIN (STAT)

## 2020-10-09 PROCEDURE — 76942 ECHO GUIDE FOR BIOPSY: CPT | Performed by: ANESTHESIOLOGY

## 2020-10-09 PROCEDURE — 76942 INFRACLAVICULAR SINGLE SHOT: ICD-10-PCS | Mod: 26,,, | Performed by: ANESTHESIOLOGY

## 2020-10-09 PROCEDURE — S0028 INJECTION, FAMOTIDINE, 20 MG: HCPCS | Performed by: NURSE ANESTHETIST, CERTIFIED REGISTERED

## 2020-10-09 PROCEDURE — 64415 NJX AA&/STRD BRCH PLXS IMG: CPT | Mod: 59,RT,, | Performed by: ANESTHESIOLOGY

## 2020-10-09 PROCEDURE — 94761 N-INVAS EAR/PLS OXIMETRY MLT: CPT

## 2020-10-09 PROCEDURE — 71000033 HC RECOVERY, INTIAL HOUR: Performed by: ORTHOPAEDIC SURGERY

## 2020-10-09 PROCEDURE — 64719 REVISE ULNAR NERVE AT WRIST: CPT | Mod: 51,RT,, | Performed by: ORTHOPAEDIC SURGERY

## 2020-10-09 PROCEDURE — 64718 PR REVISE ULNAR NERVE AT ELBOW: ICD-10-PCS | Mod: RT,,, | Performed by: ORTHOPAEDIC SURGERY

## 2020-10-09 DEVICE — IMPLANTABLE DEVICE: Type: IMPLANTABLE DEVICE | Site: WRIST | Status: FUNCTIONAL

## 2020-10-09 RX ORDER — ROPIVACAINE HYDROCHLORIDE 5 MG/ML
INJECTION, SOLUTION EPIDURAL; INFILTRATION; PERINEURAL
Status: DISCONTINUED | OUTPATIENT
Start: 2020-10-09 | End: 2020-10-09

## 2020-10-09 RX ORDER — KETAMINE HCL IN 0.9 % NACL 50 MG/5 ML
SYRINGE (ML) INTRAVENOUS
Status: DISCONTINUED | OUTPATIENT
Start: 2020-10-09 | End: 2020-10-09

## 2020-10-09 RX ORDER — ROPIVACAINE HYDROCHLORIDE 5 MG/ML
INJECTION, SOLUTION EPIDURAL; INFILTRATION; PERINEURAL
Status: COMPLETED
Start: 2020-10-09 | End: 2020-10-09

## 2020-10-09 RX ORDER — SODIUM CHLORIDE 9 MG/ML
INJECTION, SOLUTION INTRAVENOUS CONTINUOUS PRN
Status: DISCONTINUED | OUTPATIENT
Start: 2020-10-09 | End: 2020-10-09

## 2020-10-09 RX ORDER — CEFAZOLIN SODIUM 1 G/3ML
2 INJECTION, POWDER, FOR SOLUTION INTRAMUSCULAR; INTRAVENOUS
Status: DISCONTINUED | OUTPATIENT
Start: 2020-10-09 | End: 2020-10-09 | Stop reason: HOSPADM

## 2020-10-09 RX ORDER — DEXAMETHASONE SODIUM PHOSPHATE 4 MG/ML
INJECTION, SOLUTION INTRA-ARTICULAR; INTRALESIONAL; INTRAMUSCULAR; INTRAVENOUS; SOFT TISSUE
Status: DISCONTINUED | OUTPATIENT
Start: 2020-10-09 | End: 2020-10-09

## 2020-10-09 RX ORDER — ACETAMINOPHEN 500 MG
1000 TABLET ORAL ONCE
Status: DISCONTINUED | OUTPATIENT
Start: 2020-10-09 | End: 2020-10-09

## 2020-10-09 RX ORDER — SODIUM CHLORIDE 0.9 % (FLUSH) 0.9 %
10 SYRINGE (ML) INJECTION
Status: DISCONTINUED | OUTPATIENT
Start: 2020-10-09 | End: 2020-10-09 | Stop reason: HOSPADM

## 2020-10-09 RX ORDER — FENTANYL CITRATE 50 UG/ML
25 INJECTION, SOLUTION INTRAMUSCULAR; INTRAVENOUS EVERY 5 MIN PRN
Status: DISPENSED | OUTPATIENT
Start: 2020-10-09

## 2020-10-09 RX ORDER — ONDANSETRON 2 MG/ML
INJECTION INTRAMUSCULAR; INTRAVENOUS
Status: DISCONTINUED | OUTPATIENT
Start: 2020-10-09 | End: 2020-10-09

## 2020-10-09 RX ORDER — SODIUM CHLORIDE 9 MG/ML
INJECTION, SOLUTION INTRAVENOUS CONTINUOUS
Status: DISCONTINUED | OUTPATIENT
Start: 2020-10-09 | End: 2020-10-09 | Stop reason: HOSPADM

## 2020-10-09 RX ORDER — FAMOTIDINE 10 MG/ML
INJECTION INTRAVENOUS
Status: DISCONTINUED | OUTPATIENT
Start: 2020-10-09 | End: 2020-10-09

## 2020-10-09 RX ORDER — FENTANYL CITRATE 50 UG/ML
25 INJECTION, SOLUTION INTRAMUSCULAR; INTRAVENOUS EVERY 5 MIN PRN
Status: DISCONTINUED | OUTPATIENT
Start: 2020-10-09 | End: 2020-10-09 | Stop reason: HOSPADM

## 2020-10-09 RX ORDER — ONDANSETRON 2 MG/ML
4 INJECTION INTRAMUSCULAR; INTRAVENOUS DAILY PRN
Status: DISCONTINUED | OUTPATIENT
Start: 2020-10-09 | End: 2020-10-09 | Stop reason: HOSPADM

## 2020-10-09 RX ORDER — MIDAZOLAM HYDROCHLORIDE 1 MG/ML
0.5 INJECTION INTRAMUSCULAR; INTRAVENOUS
Status: DISPENSED | OUTPATIENT
Start: 2020-10-09

## 2020-10-09 RX ORDER — CEFAZOLIN SODIUM 1 G/3ML
INJECTION, POWDER, FOR SOLUTION INTRAMUSCULAR; INTRAVENOUS
Status: DISCONTINUED | OUTPATIENT
Start: 2020-10-09 | End: 2020-10-09

## 2020-10-09 RX ORDER — ACETAMINOPHEN 500 MG
1000 TABLET ORAL ONCE
Status: COMPLETED | OUTPATIENT
Start: 2020-10-09 | End: 2020-10-09

## 2020-10-09 RX ORDER — BUPIVACAINE HYDROCHLORIDE 2.5 MG/ML
INJECTION, SOLUTION EPIDURAL; INFILTRATION; INTRACAUDAL
Status: DISCONTINUED | OUTPATIENT
Start: 2020-10-09 | End: 2020-10-09 | Stop reason: HOSPADM

## 2020-10-09 RX ORDER — MIDAZOLAM HYDROCHLORIDE 1 MG/ML
INJECTION, SOLUTION INTRAMUSCULAR; INTRAVENOUS
Status: DISCONTINUED | OUTPATIENT
Start: 2020-10-09 | End: 2020-10-09

## 2020-10-09 RX ORDER — PROPOFOL 10 MG/ML
VIAL (ML) INTRAVENOUS
Status: DISCONTINUED | OUTPATIENT
Start: 2020-10-09 | End: 2020-10-09

## 2020-10-09 RX ORDER — LIDOCAINE HYDROCHLORIDE 10 MG/ML
INJECTION, SOLUTION INTRAVENOUS
Status: DISCONTINUED | OUTPATIENT
Start: 2020-10-09 | End: 2020-10-09

## 2020-10-09 RX ORDER — ROCURONIUM BROMIDE 10 MG/ML
INJECTION, SOLUTION INTRAVENOUS
Status: DISCONTINUED | OUTPATIENT
Start: 2020-10-09 | End: 2020-10-09

## 2020-10-09 RX ORDER — FENTANYL CITRATE 50 UG/ML
INJECTION, SOLUTION INTRAMUSCULAR; INTRAVENOUS
Status: DISCONTINUED | OUTPATIENT
Start: 2020-10-09 | End: 2020-10-09

## 2020-10-09 RX ORDER — DEXMEDETOMIDINE HYDROCHLORIDE 100 UG/ML
INJECTION, SOLUTION INTRAVENOUS
Status: DISCONTINUED | OUTPATIENT
Start: 2020-10-09 | End: 2020-10-09

## 2020-10-09 RX ADMIN — DEXMEDETOMIDINE HYDROCHLORIDE 20 MCG: 100 INJECTION, SOLUTION, CONCENTRATE INTRAVENOUS at 10:10

## 2020-10-09 RX ADMIN — ACETAMINOPHEN 1000 MG: 500 TABLET ORAL at 09:10

## 2020-10-09 RX ADMIN — SODIUM CHLORIDE: 0.9 INJECTION, SOLUTION INTRAVENOUS at 09:10

## 2020-10-09 RX ADMIN — MEPIVACAINE HYDROCHLORIDE 5 ML: 20 INJECTION, SOLUTION EPIDURAL; INFILTRATION at 12:10

## 2020-10-09 RX ADMIN — SUGAMMADEX 200 MG: 100 INJECTION, SOLUTION INTRAVENOUS at 10:10

## 2020-10-09 RX ADMIN — DEXAMETHASONE SODIUM PHOSPHATE 4 MG: 4 INJECTION, SOLUTION INTRAMUSCULAR; INTRAVENOUS at 10:10

## 2020-10-09 RX ADMIN — ROCURONIUM BROMIDE 30 MG: 10 INJECTION, SOLUTION INTRAVENOUS at 09:10

## 2020-10-09 RX ADMIN — MIDAZOLAM HYDROCHLORIDE 2 MG: 1 INJECTION, SOLUTION INTRAMUSCULAR; INTRAVENOUS at 09:10

## 2020-10-09 RX ADMIN — PROPOFOL 150 MG: 10 INJECTION, EMULSION INTRAVENOUS at 09:10

## 2020-10-09 RX ADMIN — Medication 30 MG: at 09:10

## 2020-10-09 RX ADMIN — CEFAZOLIN 2 G: 330 INJECTION, POWDER, FOR SOLUTION INTRAMUSCULAR; INTRAVENOUS at 09:10

## 2020-10-09 RX ADMIN — ROPIVACAINE HYDROCHLORIDE 15 ML: 5 INJECTION, SOLUTION EPIDURAL; INFILTRATION; PERINEURAL at 12:10

## 2020-10-09 RX ADMIN — FENTANYL CITRATE 100 MCG: 50 INJECTION, SOLUTION INTRAMUSCULAR; INTRAVENOUS at 09:10

## 2020-10-09 RX ADMIN — ONDANSETRON 4 MG: 2 INJECTION, SOLUTION INTRAMUSCULAR; INTRAVENOUS at 10:10

## 2020-10-09 RX ADMIN — LIDOCAINE HYDROCHLORIDE 100 MG: 10 INJECTION, SOLUTION INTRAVENOUS at 09:10

## 2020-10-09 RX ADMIN — FAMOTIDINE 20 MG: 10 INJECTION, SOLUTION INTRAVENOUS at 10:10

## 2020-10-09 NOTE — BRIEF OP NOTE
[facility]  Brief Operative Note    Surgery Date: 10/9/2020     Surgeon(s) and Role:     * Shelly Vasques MD - Primary    Assisting Surgeon: None    Pre-op Diagnosis:  Ulnar neuropathy of both upper extremities [G56.23]    Post-op Diagnosis:  Post-Op Diagnosis Codes:     * Ulnar neuropathy of both upper extremities [G56.23]    Procedure(s) (LRB):  DECOMPRESSION, NERVE, ULNAR right wrist (Right)  DECOMPRESSION, NERVE ulnar right elbow (Right)    Anesthesia: General    Description of the findings of the procedure(s): see op note    Estimated Blood Loss: * No values recorded between 10/9/2020 10:17 AM and 10/9/2020 11:11 AM *         Specimens:   Specimen (12h ago, onward)    None            Discharge Note    OUTCOME: Patient tolerated treatment/procedure well without complication and is now ready for discharge.    DISPOSITION: Home or Self Care    FINAL DIAGNOSIS:  <principal problem not specified>    FOLLOWUP: In clinic    DISCHARGE INSTRUCTIONS:    Discharge Procedure Orders   Diet Adult Regular     Notify your health care provider if you experience any of the following:  temperature >100.4     Notify your health care provider if you experience any of the following:  persistent nausea and vomiting or diarrhea     Notify your health care provider if you experience any of the following:  severe uncontrolled pain     Notify your health care provider if you experience any of the following:  redness, tenderness, or signs of infection (pain, swelling, redness, odor or green/yellow discharge around incision site)     Notify your health care provider if you experience any of the following:  difficulty breathing or increased cough     Notify your health care provider if you experience any of the following:  severe persistent headache     Notify your health care provider if you experience any of the following:  worsening rash     Notify your health care provider if you experience any of the  following:  persistent dizziness, light-headedness, or visual disturbances     Notify your health care provider if you experience any of the following:  increased confusion or weakness     Notify your health care provider if you experience any of the following:     Leave dressing on - Keep it clean, dry, and intact until clinic visit     Weight bearing restrictions (specify):   Order Comments: DAKOTAH BUNN

## 2020-10-09 NOTE — PLAN OF CARE
AAOX3, on stretcher in semi mendoza position. No obvious signs of distress noted. Sister to keep all belongings. Will continue to monitor.

## 2020-10-09 NOTE — TRANSFER OF CARE
"Anesthesia Transfer of Care Note    Patient: Deborah Navas    Procedure(s) Performed: Procedure(s) (LRB):  DECOMPRESSION, NERVE, ULNAR right wrist (Right)  DECOMPRESSION, NERVE ulnar right elbow (Right)    Patient location: PACU    Anesthesia Type: general    Transport from OR: Transported from OR on room air with adequate spontaneous ventilation. Transported from OR on 6-10 L/min O2 by face mask with adequate spontaneous ventilation    Post pain: adequate analgesia    Post assessment: no apparent anesthetic complications and tolerated procedure well    Post vital signs: stable    Level of consciousness: awake and alert    Nausea/Vomiting: no nausea/vomiting    Complications: none    Transfer of care protocol was followed      Last vitals:   Visit Vitals  /78 (BP Location: Left arm, Patient Position: Lying)   Pulse 92   Temp 36.8 °C (98.3 °F) (Oral)   Resp 19   Ht 5' 7" (1.702 m)   Wt 94.8 kg (209 lb)   LMP  (LMP Unknown)   SpO2 97%   Breastfeeding No   BMI 32.73 kg/m²     "
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
## 2020-10-09 NOTE — ANESTHESIA PROCEDURE NOTES
Intubation  Performed by: Niya Giles CRNA  Authorized by: Joaquin Garrison MD     Intubation:     Induction:  Intravenous    Intubated:  Postinduction    Mask Ventilation:  Easy mask    Attempts:  1    Attempted By:  CRNA    Method of Intubation:  Video laryngoscopy    Blade:  Warren 2    Laryngeal View Grade: Grade IIA - cords partially seen      Difficult Airway Encountered?: No      Complications:  None    Airway Device:  Oral endotracheal tube    Airway Device Size:  7.0    Style/Cuff Inflation:  Cuffed    Inflation Amount (mL):  7    Tube secured:  21    Secured at:  The lips    Placement Verified By:  Capnometry and Colorimetric ETCO2 device    Complicating Factors:  Anterior larynx    Findings Post-Intubation:  BS equal bilateral

## 2020-10-09 NOTE — PLAN OF CARE
1209-Dr Coles at bedside to perform post op nerve block.    1255-Patient is AAO and VSS.  Tolerating PO and states pain is tolerable.  Dressing CDI.  Patient states they are ready for d/c.  IV removed.  Catheter tip intact.  Caregiver at bedside.  Discharge instructions reviewed and copy given to the patient and caregiver.  Questions answered.  Both verbalized understanding. *Medication delivered to bedside.  Patient wheeled to car by RN with arm in a sling.

## 2020-10-09 NOTE — PROGRESS NOTES
Noted that pt had her surgery for bilateral ulnar neuropathy and has been discharged home. She already has f/u for the surgeon as well as heart transplant team.

## 2020-10-09 NOTE — ANESTHESIA PROCEDURE NOTES
Infraclavicular Single Shot    Patient location during procedure: post-op   Block not for primary anesthetic.  Reason for block: at surgeon's request and post-op pain management   Post-op Pain Location: Right elbow and hand  Start time: 10/9/2020 12:09 PM  Timeout: 10/9/2020 12:09 PM   End time: 10/9/2020 12:15 PM    Staffing  Authorizing Provider: Joaquin Garrison MD  Performing Provider: Iam Coles MD    Preanesthetic Checklist  Completed: patient identified, site marked, surgical consent, pre-op evaluation, timeout performed, IV checked, risks and benefits discussed and monitors and equipment checked  Peripheral Block  Patient position: supine  Prep: ChloraPrep  Patient monitoring: heart rate, cardiac monitor, continuous pulse ox, continuous capnometry and frequent blood pressure checks  Block type: infraclavicular  Laterality: right  Injection technique: single shot  Needle  Needle type: Echogenic   Needle gauge: 21 G  Needle length: 4 in  Needle localization: ultrasound guidance   -ultrasound image captured on disc.  Assessment  Injection assessment: negative aspiration, negative parasthesia and local visualized surrounding nerve  Paresthesia pain: none  Heart rate change: no  Slow fractionated injection: yes  Additional Notes  Post op block was performed for postop pain control after intact neurologic status was confirmed by the surgical team.  Initially pt was in 7/10 pain and immedicately after infraclavicular block was performed 4/10 and appearing more comfortable.    Epi 1:200,000, Decadron 1 mg, and Clonidine 30 mcg were added to prolong nerve block.

## 2020-10-10 ENCOUNTER — PATIENT MESSAGE (OUTPATIENT)
Dept: ADMINISTRATIVE | Facility: OTHER | Age: 50
End: 2020-10-10

## 2020-10-11 NOTE — ANESTHESIA POSTPROCEDURE EVALUATION
Anesthesia Post Evaluation    Patient: Deborah Navas    Procedure(s) Performed: Procedure(s) (LRB):  DECOMPRESSION, NERVE, ULNAR right wrist (Right)  DECOMPRESSION, NERVE ulnar right elbow (Right)    Final Anesthesia Type: general    Patient location during evaluation: PACU  Patient participation: Yes- Able to Participate  Level of consciousness: awake and alert, awake and oriented  Post-procedure vital signs: reviewed and stable  Pain management: adequate  Airway patency: patent    PONV status at discharge: No PONV  Anesthetic complications: no      Cardiovascular status: blood pressure returned to baseline, hemodynamically stable and stable  Respiratory status: unassisted, spontaneous ventilation and room air  Hydration status: euvolemic  Follow-up not needed.          Vitals Value Taken Time   /76 10/09/20 1230   Temp 36.5 °C (97.7 °F) 10/09/20 1112   Pulse 95 10/09/20 1230   Resp 17 10/09/20 1230   SpO2 96 % 10/09/20 1230         Event Time   Out of Recovery 11:42:00         Pain/Zaria Score: No data recorded

## 2020-10-12 NOTE — OP NOTE
[facility]  Surgery Department  Operative Note    SUMMARY     Date of Procedure: 10/9/2020     Procedure: Procedure(s) (LRB):  DECOMPRESSION, NERVE, ULNAR right wrist (Right)  DECOMPRESSION, NERVE ulnar right elbow (Right)     Surgeon(s) and Role:     * Shelly Vasques MD - Primary    Assisting Surgeon: DEZ Blanco MD    Pre-Operative Diagnosis: Ulnar neuropathy of both upper extremities [G56.23]    Post-Operative Diagnosis: Post-Op Diagnosis Codes:     * Ulnar neuropathy of both upper extremities [G56.23]    Anesthesia: General    Technical Procedures Used: suregry    Description of the Findings of the Procedure:  Indication for procedure Ms. Naavs is a 49-year-old female she developed significant ulnar neuropathy in her bilateral hands she had complete numbness in ulnar side of the hand as well significant interosseous wasting she is unable to fully abduct her thumb and she has escape sign she states this occurred after a surgery however it is bilateral on on examination she does have cubital tunnel as well as Guyon's EMG nerve conduction study was performed after much discussion with the patient elected for surgical intervention patient understands that a lot of the interosseous wasting may not return after the surgery patient wishes to proceed could she does not want to lose any further strength.  Risks and benefits were explained in great detail consents were signed in clinic    Procedure in detail the correct site was marked with the patient's participation the holding area the patient was brought to the operating room placed in supine position underwent general seizure right upper extremities prepped draped normal sterile fashion a well-padded sterile tourniquet was placed on the right upper extremity time-out was conducted for the correct procedure to be indicated IV antibiotics given patient preoperatively incision was marked out 1 posterior to the medial epicondyle at the  elbow on the right side as well as another on the palm that extended across the wrist volarly on to the forearm this is just ulnar to Castañeda's cardinal line to the arm was exsanguinated with an Esmarch tourniquet was insufflated 250 mmHg incision at the elbow was made 1st just posterior to medial epicondyle all the while protecting the medial antebrachial cutaneous nerve dissection was carried out to the ulnar nerve it was identified and it was completely decompressed there was some hyperemic changes in the nerve the elbow was placed through range of motion showed that the nerve did not sublux the areas irrigated copious amounts normal saline clarix  sutured into place Vicryl Monocryl Dermabond closed the skin our attention was turned to Gupetros's incision was made careful dissection down to the ulnar nerve in the forearm was maintained this was traced out distally along with the ulnar artery there was significant amount of compression at this location and was completely decompressed was very fatty once I got into the level of the palm especially where the motor branch divided after was completely decompressed the areas irrigated copious amounts normal saline clarix was placed into position Vicryl nylon closed the skin sterile dressing was applied tourniquet was deflated brisk cap refill sued patient was placed in a well-padded splint tolerated suture was brought to cover area in stable condition    Postop plans patient keep the dressing clean dry and intact will see the patient back in 2 weeks time sutures out therapy to be initiated at that time    Significant Surgical Tasks Conducted by the Assistant(s), if Applicable: retraction    Complications: No    Estimated Blood Loss (EBL): * No values recorded between 10/9/2020 10:17 AM and 10/9/2020 11:11 AM *           Implants:   Implant Name Type Inv. Item Serial No.  Lot No. LRB No. Used Action   [id number]  MEMBRANE NEOX 1K 3X2CM  [id number] [employer]  Right 1 Implanted   [id number]  MEMBRANE NEOX 1K 3X2CM [id number] [employer]  Right 1 Implanted       Specimens:   Specimen (12h ago, onward)    None                  Condition: Good    Disposition: PACU - hemodynamically stable.    Attestation: I performed the procedure.    Discharge Note    SUMMARY     Admit Date: 10/9/2020    Discharge Date and Time: 10/9/2020 12:55 PM    Hospital Course (synopsis of major diagnoses, care, treatment, and services provided during the course of the hospital stay): surgery     Final Diagnosis: Post-Op Diagnosis Codes:     * Ulnar neuropathy of both upper extremities [G56.23]    Disposition: Home or Self Care    Follow Up/Patient Instructions:     Medications:  Reconciled Home Medications:      Medication List      CHANGE how you take these medications    atorvastatin 20 MG tablet  Commonly known as: LIPITOR  Take 1 tablet (20 mg total) by mouth once daily.  What changed: when to take this        CONTINUE taking these medications    amLODIPine 5 MG tablet  Commonly known as: NORVASC  Take 1 tablet (5 mg total) by mouth once daily.     amoxicillin 500 MG capsule  Commonly known as: AMOXIL  Take 4 capsules (2,000 mg total) by mouth On call Procedure. Take 4 capsules one hour prior to dental procedure.     aspirin 81 MG EC tablet  Commonly known as: ECOTRIN  Take 1 tablet (81 mg total) by mouth once daily.     calcium carbonate-vitamin D3 1,000 mg(2,500 mg)-800 unit Tab  Take 1 tablet by mouth once daily.     dapsone 100 MG Tab  Take 1 tablet (100 mg total) by mouth once daily.     ferrous sulfate 325 (65 FE) MG EC tablet  Take 1 tablet (325 mg total) by mouth 3 (three) times daily with meals. And take 4 oz of OJ or at least 250 mg of Vit C with each dose     gabapentin 300 MG capsule  Commonly known as: NEURONTIN  Take 1 capsule (300 mg total) by mouth every evening.     HYDROcodone-acetaminophen 5-325 mg per  tablet  Commonly known as: NORCO  Take 1 tablet by mouth every 6 (six) hours as needed.     JANUVIA 50 MG Tab  Generic drug: SITagliptin  Take 1 tablet (50 mg total) by mouth every morning.     magnesium oxide 400 mg (241.3 mg magnesium) tablet  Commonly known as: MAG-OX  Take 1 tablet (400 mg total) by mouth once daily.     mycophenolate 250 mg Cap  Commonly known as: CELLCEPT  Take 4 capsules (1,000 mg total) by mouth 2 (two) times daily.     [facility] transplant care kit 1 Kit  Welcome to [facility].  This is your transplant care kit.     pantoprazole 40 MG tablet  Commonly known as: PROTONIX  Take 1 tablet (40 mg total) by mouth once daily.     predniSONE 5 MG tablet  Commonly known as: DELTASONE  Take 1 tablet (5 mg total) by mouth once daily.     senna-docusate 8.6-50 mg 8.6-50 mg per tablet  Commonly known as: PERICOLACE  Take 2 tablets by mouth 2 (two) times daily as needed for Constipation.     tacrolimus 1 MG Cap  Commonly known as: PROGRAF  Take 3 capsules (3 mg total) by mouth every 12 (twelve) hours.     venlafaxine 150 MG Cp24  Commonly known as: EFFEXOR-XR  Take 1 capsule (150 mg total) by mouth once daily.     zolpidem 12.5 MG CR tablet  Commonly known as: AMBIEN CR  TAKE 1 TABLET BY MOUTH EVERY DAY AT NIGHT AS NEEDED          Discharge Procedure Orders   Ambulatory referral/consult to Physical/Occupational Therapy   Standing Status: Future   Referral Priority: Routine Referral Type: Physical Medicine   Referral Reason: Specialty Services Required   Requested Specialty: Occupational Therapy   Number of Visits Requested: 1     Diet Adult Regular     Notify your health care provider if you experience any of the following:  temperature >100.4     Notify your health care provider if you experience any of the following:  persistent nausea and vomiting or diarrhea     Notify your health care provider if you experience any of the following:  severe uncontrolled pain     Notify your health care  provider if you experience any of the following:  redness, tenderness, or signs of infection (pain, swelling, redness, odor or green/yellow discharge around incision site)     Notify your health care provider if you experience any of the following:  difficulty breathing or increased cough     Notify your health care provider if you experience any of the following:  severe persistent headache     Notify your health care provider if you experience any of the following:  worsening rash     Notify your health care provider if you experience any of the following:  persistent dizziness, light-headedness, or visual disturbances     Notify your health care provider if you experience any of the following:  increased confusion or weakness     Notify your health care provider if you experience any of the following:     Leave dressing on - Keep it clean, dry, and intact until clinic visit     Weight bearing restrictions (specify):   Order Comments: DAKOTAH BUNN     Follow-up Information     ZAC Ferreira On 10/21/2020.    Specialties: Hand Surgery, Orthopedic Surgery  Contact information:  [address] [id number] [phone]

## 2020-10-15 DIAGNOSIS — Z94.1 STATUS POST HEART TRANSPLANT: ICD-10-CM

## 2020-10-15 RX ORDER — FERROUS SULFATE 325(65) MG
325 TABLET, DELAYED RELEASE (ENTERIC COATED) ORAL
Qty: 90 TABLET | Refills: 3 | Status: CANCELLED | OUTPATIENT
Start: 2020-10-15

## 2020-10-19 ENCOUNTER — PATIENT MESSAGE (OUTPATIENT)
Dept: ORTHOPEDICS | Facility: CLINIC | Age: 50
End: 2020-10-19

## 2020-10-20 ENCOUNTER — PATIENT MESSAGE (OUTPATIENT)
Dept: TRANSPLANT | Facility: CLINIC | Age: 50
End: 2020-10-20

## 2020-10-22 ENCOUNTER — HOSPITAL ENCOUNTER (OUTPATIENT)
Dept: RADIOLOGY | Facility: OTHER | Age: 50
Discharge: HOME | End: 2020-10-22
Attending: PHYSICIAN ASSISTANT
Payer: COMMERCIAL

## 2020-10-22 ENCOUNTER — OFFICE VISIT (OUTPATIENT)
Dept: ORTHOPEDICS | Facility: CLINIC | Age: 50
End: 2020-10-22
Payer: COMMERCIAL

## 2020-10-22 ENCOUNTER — PATIENT MESSAGE (OUTPATIENT)
Dept: TRANSPLANT | Facility: CLINIC | Age: 50
End: 2020-10-22

## 2020-10-22 VITALS
SYSTOLIC BLOOD PRESSURE: 118 MMHG | HEIGHT: 67 IN | DIASTOLIC BLOOD PRESSURE: 77 MMHG | BODY MASS INDEX: 32.8 KG/M2 | HEART RATE: 103 BPM | WEIGHT: 209 LBS

## 2020-10-22 DIAGNOSIS — G56.23 ULNAR NEUROPATHY OF BOTH UPPER EXTREMITIES: ICD-10-CM

## 2020-10-22 DIAGNOSIS — G56.23 ULNAR NEUROPATHY OF BOTH UPPER EXTREMITIES: Primary | ICD-10-CM

## 2020-10-22 DIAGNOSIS — Z47.89 ORTHOPEDIC AFTERCARE: ICD-10-CM

## 2020-10-22 PROCEDURE — 99999 PR PBB SHADOW E&M-EST. PATIENT-LVL IV: ICD-10-PCS | Mod: PBBFAC,,, | Performed by: PHYSICIAN ASSISTANT

## 2020-10-22 PROCEDURE — 99999 PR PBB SHADOW E&M-EST. PATIENT-LVL IV: CPT | Mod: PBBFAC,,, | Performed by: PHYSICIAN ASSISTANT

## 2020-10-22 PROCEDURE — 97760 PR ORTHOTIC MGMT&TRAINJ INITIAL ENC EA 15 MINS: ICD-10-PCS | Mod: S$GLB,,, | Performed by: PHYSICIAN ASSISTANT

## 2020-10-22 PROCEDURE — 73080 XR ELBOW COMPLETE 3 VIEW RIGHT: ICD-10-PCS | Mod: 26,RT,, | Performed by: RADIOLOGY

## 2020-10-22 PROCEDURE — 97760 ORTHOTIC MGMT&TRAING 1ST ENC: CPT | Mod: S$GLB,,, | Performed by: PHYSICIAN ASSISTANT

## 2020-10-22 PROCEDURE — 73080 X-RAY EXAM OF ELBOW: CPT | Mod: TC,FY,RT

## 2020-10-22 PROCEDURE — 73080 X-RAY EXAM OF ELBOW: CPT | Mod: 26,RT,, | Performed by: RADIOLOGY

## 2020-10-22 PROCEDURE — 99024 POSTOP FOLLOW-UP VISIT: CPT | Mod: S$GLB,,, | Performed by: PHYSICIAN ASSISTANT

## 2020-10-22 PROCEDURE — 99024 PR POST-OP FOLLOW-UP VISIT: ICD-10-PCS | Mod: S$GLB,,, | Performed by: PHYSICIAN ASSISTANT

## 2020-10-22 NOTE — PROGRESS NOTES
"Ms. Navas is here today for a post-operative visit.  She is 13 days status post right ulnar nerve decompression at [facility]'s canal and cubital tunnel by Dr. Vasques on 10/9/2020. She reports that she is doing well.  Pain is mild, 2/10. She is scheduled to start OT tomorrow.  She denies fever, chills, and sweats since the time of the surgery.     Physical exam:    Vitals:    10/22/20 1613   BP: 118/77   Pulse: 103   Weight: 94.8 kg (209 lb)   Height: 5' 7" (1.702 m)   PainSc:   2     Vital signs are stable, patient is afebrile.  Patient is well dressed and well groomed, no acute distress.  Alert and oriented to person, place, and time.  Post op dressing taken down.  Incision is clean, dry, and intact.  There is no erythema or exudate.  There is no sign of any infection. She is NVI- decreased ulnar nerve sensation bilaterally. Sutures and suture tails removed without difficulty.      Assessment: 13 days status post right ulnar nerve decompression at [facility]'s canal and cubital tunnel    Plan:  Deborah was seen today for post-op evaluation.    Diagnoses and all orders for this visit:    Ulnar neuropathy of both upper extremities    Orthopedic aftercare        - PO instruction reviewed and provided to patient  - wrist brace provided (15 minutes spent preparing, fitting, and educating on brace).  - ACE wrap provided  - OT as scheduled  - Discussed scar massage  - Follow up in 4 weeks  - Call with questions or concerns        "

## 2020-10-23 ENCOUNTER — CLINICAL SUPPORT (OUTPATIENT)
Dept: REHABILITATION | Facility: HOSPITAL | Age: 50
End: 2020-10-23
Payer: COMMERCIAL

## 2020-10-23 DIAGNOSIS — R29.898 DECREASED GRIP STRENGTH OF RIGHT HAND: ICD-10-CM

## 2020-10-23 DIAGNOSIS — Z78.9 DECREASED ACTIVITIES OF DAILY LIVING (ADL): ICD-10-CM

## 2020-10-23 DIAGNOSIS — M62.541 THENAR MUSCLE ATROPHY OF RIGHT HAND: ICD-10-CM

## 2020-10-23 DIAGNOSIS — M25.631 DECREASED RANGE OF MOTION OF RIGHT WRIST: ICD-10-CM

## 2020-10-23 DIAGNOSIS — R29.898 POOR FINE MOTOR SKILLS: Primary | ICD-10-CM

## 2020-10-23 PROCEDURE — 97165 OT EVAL LOW COMPLEX 30 MIN: CPT

## 2020-10-23 PROCEDURE — 97110 THERAPEUTIC EXERCISES: CPT

## 2020-10-26 ENCOUNTER — HOSPITAL ENCOUNTER (OUTPATIENT)
Dept: CARDIOLOGY | Facility: HOSPITAL | Age: 50
Discharge: HOME | End: 2020-10-26
Attending: INTERNAL MEDICINE
Payer: COMMERCIAL

## 2020-10-26 ENCOUNTER — LAB VISIT (OUTPATIENT)
Dept: LAB | Facility: HOSPITAL | Age: 50
End: 2020-10-26
Attending: INTERNAL MEDICINE
Payer: COMMERCIAL

## 2020-10-26 VITALS — BODY MASS INDEX: 32.73 KG/M2 | HEIGHT: 67 IN

## 2020-10-26 DIAGNOSIS — N28.9 RENAL INSUFFICIENCY: ICD-10-CM

## 2020-10-26 DIAGNOSIS — Z94.1 STATUS POST HEART TRANSPLANT: ICD-10-CM

## 2020-10-26 DIAGNOSIS — T86.298 OTHER COMPLICATION OF HEART TRANSPLANT: ICD-10-CM

## 2020-10-26 DIAGNOSIS — Z79.899 ENCOUNTER FOR LONG-TERM (CURRENT) USE OF MEDICATIONS: ICD-10-CM

## 2020-10-26 LAB
ALBUMIN SERPL BCP-MCNC: 4.1 G/DL (ref 3.5–5.2)
ALP SERPL-CCNC: 69 U/L (ref 55–135)
ALT SERPL W/O P-5'-P-CCNC: 11 U/L (ref 10–44)
ANION GAP SERPL CALC-SCNC: 9 MMOL/L (ref 8–16)
AORTIC ROOT ANNULUS: 2.97 CM
AST SERPL-CCNC: 15 U/L (ref 10–40)
AV INDEX (PROSTH): 0.71
AV MEAN GRADIENT: 5 MMHG
AV PEAK GRADIENT: 9 MMHG
AV VALVE AREA: 2.31 CM2
AV VELOCITY RATIO: 0.65
BASOPHILS # BLD AUTO: 0.06 K/UL (ref 0–0.2)
BASOPHILS NFR BLD: 0.7 % (ref 0–1.9)
BILIRUB SERPL-MCNC: 0.6 MG/DL (ref 0.1–1)
BNP SERPL-MCNC: 93 PG/ML (ref 0–99)
BUN SERPL-MCNC: 21 MG/DL (ref 6–20)
CALCIUM SERPL-MCNC: 10 MG/DL (ref 8.7–10.5)
CHLORIDE SERPL-SCNC: 102 MMOL/L (ref 95–110)
CO2 SERPL-SCNC: 32 MMOL/L (ref 23–29)
CREAT SERPL-MCNC: 1.2 MG/DL (ref 0.5–1.4)
CV ECHO LV RWT: 0.59 CM
DIFFERENTIAL METHOD: ABNORMAL
DOP CALC AO PEAK VEL: 1.54 M/S
DOP CALC AO VTI: 26.56 CM
DOP CALC LVOT AREA: 3.3 CM2
DOP CALC LVOT DIAMETER: 2.04 CM
DOP CALC LVOT PEAK VEL: 1 M/S
DOP CALC LVOT STROKE VOLUME: 61.22 CM3
DOP CALC RVOT PEAK VEL: 0.7 M/S
DOP CALC RVOT VTI: 13.67 CM
DOP CALCLVOT PEAK VEL VTI: 18.74 CM
E WAVE DECELERATION TIME: 178.78 MSEC
E/A RATIO: 1.21
E/E' RATIO: 6 M/S
ECHO LV POSTERIOR WALL: 1.18 CM (ref 0.6–1.1)
EOSINOPHIL # BLD AUTO: 0.2 K/UL (ref 0–0.5)
EOSINOPHIL NFR BLD: 2.1 % (ref 0–8)
ERYTHROCYTE [DISTWIDTH] IN BLOOD BY AUTOMATED COUNT: 13.6 % (ref 11.5–14.5)
EST. GFR  (AFRICAN AMERICAN): >60 ML/MIN/1.73 M^2
EST. GFR  (NON AFRICAN AMERICAN): 52.5 ML/MIN/1.73 M^2
FRACTIONAL SHORTENING: 34 % (ref 28–44)
GLUCOSE SERPL-MCNC: 106 MG/DL (ref 70–110)
HCT VFR BLD AUTO: 42.6 % (ref 37–48.5)
HGB BLD-MCNC: 12.6 G/DL (ref 12–16)
IMM GRANULOCYTES # BLD AUTO: 0.04 K/UL (ref 0–0.04)
IMM GRANULOCYTES NFR BLD AUTO: 0.5 % (ref 0–0.5)
INTERVENTRICULAR SEPTUM: 1.09 CM (ref 0.6–1.1)
IVRT: 88.49 MSEC
LEFT INTERNAL DIMENSION IN SYSTOLE: 2.62 CM (ref 2.1–4)
LEFT VENTRICLE DIASTOLIC VOLUME: 68.84 ML
LEFT VENTRICLE SYSTOLIC VOLUME: 25.01 ML
LEFT VENTRICULAR INTERNAL DIMENSION IN DIASTOLE: 3.97 CM (ref 3.5–6)
LEFT VENTRICULAR MASS: 150.69 G
LV LATERAL E/E' RATIO: 6.23 M/S
LV SEPTAL E/E' RATIO: 5.79 M/S
LYMPHOCYTES # BLD AUTO: 0.6 K/UL (ref 1–4.8)
LYMPHOCYTES NFR BLD: 7.4 % (ref 18–48)
MAGNESIUM SERPL-MCNC: 1.6 MG/DL (ref 1.6–2.6)
MCH RBC QN AUTO: 26.4 PG (ref 27–31)
MCHC RBC AUTO-ENTMCNC: 29.6 G/DL (ref 32–36)
MCV RBC AUTO: 89 FL (ref 82–98)
MONOCYTES # BLD AUTO: 0.6 K/UL (ref 0.3–1)
MONOCYTES NFR BLD: 7.6 % (ref 4–15)
MV PEAK A VEL: 0.67 M/S
MV PEAK E VEL: 0.81 M/S
MV STENOSIS PRESSURE HALF TIME: 51.85 MS
MV VALVE AREA P 1/2 METHOD: 4.24 CM2
NEUTROPHILS # BLD AUTO: 6.7 K/UL (ref 1.8–7.7)
NEUTROPHILS NFR BLD: 81.7 % (ref 38–73)
NRBC BLD-RTO: 0 /100 WBC
PISA TR MAX VEL: 2.69 M/S
PLATELET # BLD AUTO: 385 K/UL (ref 150–350)
PMV BLD AUTO: 9.8 FL (ref 9.2–12.9)
POTASSIUM SERPL-SCNC: 3.8 MMOL/L (ref 3.5–5.1)
PROT SERPL-MCNC: 7 G/DL (ref 6–8.4)
PV MEAN GRADIENT: 1.18 MMHG
PV PEAK VELOCITY: 1.16 CM/S
RA PRESSURE: 3 MMHG
RBC # BLD AUTO: 4.77 M/UL (ref 4–5.4)
SINUS: 2.42 CM
SODIUM SERPL-SCNC: 143 MMOL/L (ref 136–145)
STJ: 2.15 CM
TDI LATERAL: 0.13 M/S
TDI SEPTAL: 0.14 M/S
TDI: 0.14 M/S
TR MAX PG: 29 MMHG
TV REST PULMONARY ARTERY PRESSURE: 32 MMHG
WBC # BLD AUTO: 8.25 K/UL (ref 3.9–12.7)

## 2020-10-26 PROCEDURE — 93306 TTE W/DOPPLER COMPLETE: CPT | Mod: 26,,, | Performed by: INTERNAL MEDICINE

## 2020-10-26 PROCEDURE — 36415 COLL VENOUS BLD VENIPUNCTURE: CPT

## 2020-10-26 PROCEDURE — 80197 ASSAY OF TACROLIMUS: CPT

## 2020-10-26 PROCEDURE — 85025 COMPLETE CBC W/AUTO DIFF WBC: CPT

## 2020-10-26 PROCEDURE — 93306 ECHO (CUPID ONLY): ICD-10-PCS | Mod: 26,,, | Performed by: INTERNAL MEDICINE

## 2020-10-26 PROCEDURE — 80053 COMPREHEN METABOLIC PANEL: CPT

## 2020-10-26 PROCEDURE — 93306 TTE W/DOPPLER COMPLETE: CPT

## 2020-10-26 PROCEDURE — 83735 ASSAY OF MAGNESIUM: CPT

## 2020-10-26 PROCEDURE — 83880 ASSAY OF NATRIURETIC PEPTIDE: CPT

## 2020-10-27 LAB — TACROLIMUS BLD-MCNC: 10.1 NG/ML (ref 5–15)

## 2020-10-28 ENCOUNTER — LAB VISIT (OUTPATIENT)
Dept: LAB | Facility: HOSPITAL | Age: 50
End: 2020-10-28
Attending: FAMILY MEDICINE
Payer: COMMERCIAL

## 2020-10-28 ENCOUNTER — TELEPHONE (OUTPATIENT)
Dept: TRANSPLANT | Facility: CLINIC | Age: 50
End: 2020-10-28

## 2020-10-28 ENCOUNTER — OFFICE VISIT (OUTPATIENT)
Dept: TRANSPLANT | Facility: CLINIC | Age: 50
End: 2020-10-28
Payer: COMMERCIAL

## 2020-10-28 VITALS
BODY MASS INDEX: 33.21 KG/M2 | WEIGHT: 211.63 LBS | HEIGHT: 67 IN | HEART RATE: 116 BPM | DIASTOLIC BLOOD PRESSURE: 81 MMHG | SYSTOLIC BLOOD PRESSURE: 139 MMHG

## 2020-10-28 DIAGNOSIS — Z94.1 HEART TRANSPLANTED: Primary | ICD-10-CM

## 2020-10-28 DIAGNOSIS — M62.541 THENAR MUSCLE ATROPHY OF RIGHT HAND: ICD-10-CM

## 2020-10-28 DIAGNOSIS — Z94.1 HEART REPLACED BY TRANSPLANT: ICD-10-CM

## 2020-10-28 DIAGNOSIS — D86.85 CARDIAC SARCOIDOSIS: ICD-10-CM

## 2020-10-28 DIAGNOSIS — D84.9 IMMUNOSUPPRESSION: ICD-10-CM

## 2020-10-28 PROCEDURE — 3079F PR MOST RECENT DIASTOLIC BLOOD PRESSURE 80-89 MM HG: ICD-10-PCS | Mod: CPTII,S$GLB,, | Performed by: INTERNAL MEDICINE

## 2020-10-28 PROCEDURE — 36415 COLL VENOUS BLD VENIPUNCTURE: CPT

## 2020-10-28 PROCEDURE — 99999 PR PBB SHADOW E&M-EST. PATIENT-LVL IV: CPT | Mod: PBBFAC,,, | Performed by: INTERNAL MEDICINE

## 2020-10-28 PROCEDURE — 99214 OFFICE O/P EST MOD 30 MIN: CPT | Mod: S$GLB,,, | Performed by: INTERNAL MEDICINE

## 2020-10-28 PROCEDURE — 3008F BODY MASS INDEX DOCD: CPT | Mod: CPTII,S$GLB,, | Performed by: INTERNAL MEDICINE

## 2020-10-28 PROCEDURE — 3075F SYST BP GE 130 - 139MM HG: CPT | Mod: CPTII,S$GLB,, | Performed by: INTERNAL MEDICINE

## 2020-10-28 PROCEDURE — 3079F DIAST BP 80-89 MM HG: CPT | Mod: CPTII,S$GLB,, | Performed by: INTERNAL MEDICINE

## 2020-10-28 PROCEDURE — 99214 PR OFFICE/OUTPT VISIT, EST, LEVL IV, 30-39 MIN: ICD-10-PCS | Mod: S$GLB,,, | Performed by: INTERNAL MEDICINE

## 2020-10-28 PROCEDURE — 3008F PR BODY MASS INDEX (BMI) DOCUMENTED: ICD-10-PCS | Mod: CPTII,S$GLB,, | Performed by: INTERNAL MEDICINE

## 2020-10-28 PROCEDURE — 99999 PR PBB SHADOW E&M-EST. PATIENT-LVL IV: ICD-10-PCS | Mod: PBBFAC,,, | Performed by: INTERNAL MEDICINE

## 2020-10-28 PROCEDURE — 3075F PR MOST RECENT SYSTOLIC BLOOD PRESS GE 130-139MM HG: ICD-10-PCS | Mod: CPTII,S$GLB,, | Performed by: INTERNAL MEDICINE

## 2020-10-28 NOTE — TELEPHONE ENCOUNTER
Met with pt in clinic. Doing well after her hand surgery. Reviewed labs, ECHO and medications with pt.   Pt have several questions for Dr. Prieto and I. She asked if she can do cardiac rehab. Pt feels deconditioned and wants to go to cardiac rehab. Dr Prieto advised to start walking while we work on cardiac rehab. Order needs to be faxed to [facility].   Pt wanted to know if she can donate blood. Dr. Prieto said not until after 1 year, but will have to check with blood bank.   Pt about her appts for November and December. She didn't see any in portal. I stated her coordinator will schedule those this week for her.   Dr. Prieto advised pt will need a total body PET scan for her annual for the Sarcoidosis.Please schedule for her annual.  Pt's ECHO's have not been authorized since April and she has been having to pay for ECHO's. Tried to call [facility] to see what can be sone and why pt's ECHO's are not authorized. Will discuss with Roel tomorrow.

## 2020-10-28 NOTE — LETTER
November 9, 2020        Joaquin Del Toro  [address]  [address]  [place] [address]  Phone: [phone]  Fax: [phone]             [id number]  [address]  [facility] [address]  Phone: [phone]   Patient: Deborah Navas   MR Number: [id number]   YOB: 1970   Date of Visit: 10/28/2020       Dear Dr. Joaquin Del Toro    Thank you for referring Deborah Navas to me for evaluation. Attached you will find relevant portions of my assessment and plan of care.    If you have questions, please do not hesitate to call me. I look forward to following Deborah Navas along with you.    Sincerely,    Miriam Prieto MD    Enclosure    If you would like to receive this communication electronically, please contact [email or web address] or [phone] to request [employer] access.    [employer] is a tool which provides read-only access to select patient information with whom you have a relationship. Its easy to use and provides real time access to review your patients record including encounter summaries, notes, results, and demographic information.    If you feel you have received this communication in error or would no longer like to receive these types of communications, please e-mail [email or web address]

## 2020-10-28 NOTE — TELEPHONE ENCOUNTER
I called pt to verify that she was coming in for her appts this morning. She was in the car on her way. I told her that clinic was closing at noon. Her echo and lab had been done earlier in the week. She will get her Heart Care Kit this morning when she arrives.

## 2020-10-29 ENCOUNTER — CLINICAL SUPPORT (OUTPATIENT)
Dept: REHABILITATION | Facility: HOSPITAL | Age: 50
End: 2020-10-29
Payer: COMMERCIAL

## 2020-10-29 DIAGNOSIS — M25.631 DECREASED RANGE OF MOTION OF RIGHT WRIST: ICD-10-CM

## 2020-10-29 DIAGNOSIS — Z78.9 DECREASED ACTIVITIES OF DAILY LIVING (ADL): ICD-10-CM

## 2020-10-29 DIAGNOSIS — R29.898 DECREASED GRIP STRENGTH OF RIGHT HAND: ICD-10-CM

## 2020-10-29 DIAGNOSIS — Z94.1 HEART REPLACED BY TRANSPLANT: Primary | ICD-10-CM

## 2020-10-29 PROCEDURE — 97112 NEUROMUSCULAR REEDUCATION: CPT

## 2020-10-29 PROCEDURE — 97140 MANUAL THERAPY 1/> REGIONS: CPT

## 2020-10-29 NOTE — PROGRESS NOTES
"  Occupational Therapy Treatment Note     Date: 10/29/2020  Name: Deborah Oliva [facility] Number: [id number]    Therapy Diagnosis:   Encounter Diagnoses   Name Primary?    Decreased range of motion of right wrist     Decreased  strength of right hand     Decreased activities of daily living (ADL)      Physician: Ana Kay PA    Physician Orders: OT eval and tx  Medical Diagnosis: cubital tunnel syndrome, right; thenar muscle atrophy of left hand; thenar muscle atrophy of right hand  Evaluation Date: 10/23/2020  Insurance Authorization period Expiration: 12/31/2020  Plan of Care Expiration Period: 1/23/2020  Surgical Procedure: R ulnar nerve decompression at [facility] canal and cubital tunnel on 10/9/2020    Visit # / Visits authorized: 2 / 20  Time In: 1430  Time Out: 1515  Total Billable Time: 45 minutes    Precautions:  Standard and organ transplant      Subjective     Pt reports: "I'm still a little sensitive around my scar and my palm."    she was compliant with home exercise program given last session.   Response to previous treatment: good  Functional change: improved hook fist    Pain: 0/10  Location: n/a    Objective     Meryl received the following manual therapy techniques for 10 minutes:   - scar massage    Meryl participated in neuromuscular re-education activities to improve kinesthetic, sense and proprioception for 35 minutes, including:  - desensitization activity: rubbing with a towel around scars  - desensitization bowls: macaroni, popcorn kernel, beans, rice  - stereognosis activities: removed 10 marbles from rice bucket independently and from bean bucket with min A and extended time  - RUE ulnar nerve glides    Home Exercises and Education Provided     Education provided:   - desensitization activities  - Progress towards goals     Written Home Exercises Provided: Patient instructed to cont prior HEP.  Exercises were reviewed and Meryl was able to demonstrate them prior to the " end of the session.  Meryl demonstrated good understanding of the HEP provided.     See EMR under Patient Instructions for exercises provided prior visit.        Assessment     Introduced ulnar nerve glides on this date. Pt with fair tolerance - will continue to address at next session and may add to HEP then.    Pt would continue to benefit from skilled OT.      Meryl is progressing well towards her goals and there are no updates to goals at this time. Pt prognosis is Good.     Pt will continue to benefit from skilled outpatient occupational therapy to address the deficits listed in the problem list on initial evaluation provide pt/family education and to maximize pt's level of independence in the home and community environment.     Anticipated barriers to occupational therapy: none    Pt's spiritual, cultural and educational needs considered and pt agreeable to plan of care and goals.    Goals:  Short Term Goals: (4 weeks)  1. Pt will be independent with HEP in 2 visits.  2. Pt will be independent with ulnar nerve glides.  3. Pt will increase wrist AROM by 10 degrees to enable dressing, grooming activities.  4. Pt will tolerate light strengthening exercises for R hand.     Long Term Goals: (12 weeks)  1. Pt will be able to cross R digits to enable stability and coordination of R hand.  2. Pt will exhibit 60-70 degrees of wrist flexion/extension to enable independence with self-care and meal preparation.  3. Pt will exhibit 40-50# R  strength to allow a firm grasp on cooking utensils, steering wheel, etc.  4. Pt will exhibit 5-9# of functional pinch strength to allow writing, opening containers, and turning keys.  5. Pt will exhibit a decrease in FOTO limitation score of <60% which would indicate an improvement in quality of life.    Plan     Continue outpatient Occupational Therapy 1-2 times weekly to include the following interventions:      Modalities to decrease pain (moist heat, paraffin, fluidotherapy,  US), scar mobilization, manual therapy/joint mobilizations, A/PROM, therapeutic exercises/activities, strengthening, desensitization/sensory re-education, orthotic fitting/fabrication/training PRN, joint protections/energry conservation/adaptive equipment/activity modification, HEP/education as well as any other treatments deemed necessary based on the patient's needs or progress.     Plan Next Visit: ulnar nerve glides; desensitization activities; intrinsic strengthening - add mild resistance    AYDEN MARADIAGA, OT

## 2020-11-02 LAB — HEART CARE KIT (SHORE): NORMAL

## 2020-11-09 PROBLEM — I50.9 CHF (CONGESTIVE HEART FAILURE): Status: RESOLVED | Noted: 2020-01-02 | Resolved: 2020-11-09

## 2020-11-10 NOTE — PROGRESS NOTES
Subjective:   Ms. Navas is a 50 y.o. year old White female who received a donation after brain death heart transplant on 12/16/19.      CMV status:   Donor: +  Recipient:-    HPI  Ms. Navas is a very pleasant 49 y/o old WF s/p OHTx on 12/16/2019 (incidentially diagnosed with cardiac sacroid at time of explant) ,s/p BTT HM3 on 9/2019 HLD, obesity, and OA who comes for her routine clinic visit, now 10.5 months post OHT, and s/p lymphocele removal. Patient continues to do well today, feels great, is going to get more active, hoping to start cardiac rehab. Denies cardiopulmonary complaints. Underwent ulnar release on right upper extremity, waiting to undergo this on the left as well. Patient denies N/V/F/C, lightheadedness, dizziness, PND, orthopnea, LE edema, abdominal pain, abdominal pressure, chest pain, chest pressure, syncope, pre-syncope.  Most recent cardiac PEt was negative for sarcoidosis. Her immuno regimen includes; tacro 3/3 (goal 7 to 12),cellcept 1000 BID, and prednisone 5 daily.        Review of Systems   Constitution: Negative. Negative for chills, decreased appetite, diaphoresis, fever, malaise/fatigue, night sweats, weight gain and weight loss.   Eyes: Negative.  Negative for visual disturbance.   Cardiovascular: Negative for chest pain, claudication, cyanosis, dyspnea on exertion, irregular heartbeat, leg swelling, near-syncope, orthopnea, palpitations, paroxysmal nocturnal dyspnea and syncope.   Respiratory: Negative for cough, hemoptysis, shortness of breath, sleep disturbances due to breathing, snoring, sputum production and wheezing.    Endocrine: Negative.    Hematologic/Lymphatic: Negative.  Negative for adenopathy and bleeding problem. Does not bruise/bleed easily.   Skin: Negative for color change, dry skin, nail changes, poor wound healing, rash, skin cancer and suspicious lesions.   Musculoskeletal: Negative.  Negative for back pain, falls, gout, joint pain and muscle weakness.  "  Gastrointestinal: Negative.  Negative for bloating, abdominal pain, anorexia, constipation, diarrhea, heartburn, hematemesis, hematochezia, hemorrhoids, melena, nausea and vomiting.   Genitourinary: Negative.  Negative for nocturia.   Neurological: Negative for excessive daytime sleepiness, dizziness, focal weakness, headaches, light-headedness, paresthesias, tremors and weakness.   Psychiatric/Behavioral: Negative for depression and memory loss. The patient does not have insomnia and is not nervous/anxious.        Objective:   Blood pressure 139/81, pulse (!) 116, height 5' 7" (1.702 m), weight 96 kg (211 lb 10.3 oz).body mass index is 33.15 kg/m².    Physical Exam   Constitutional: She is oriented to person, place, and time. Vital signs are normal. She appears well-developed and well-nourished. No distress.   HENT:   Head: Normocephalic and atraumatic.   Eyes: Pupils are equal, round, and reactive to light. Conjunctivae and EOM are normal.   Neck: Normal range of motion. Neck supple. No JVD present. No thyromegaly present.   Cardiovascular: Regular rhythm and normal heart sounds. Tachycardia present. PMI is not displaced. Exam reveals no gallop and no friction rub.   No murmur heard.  Pulmonary/Chest: Effort normal and breath sounds normal. No respiratory distress. She has no wheezes. She has no rales.   Abdominal: Soft. Bowel sounds are normal. She exhibits no distension. There is no abdominal tenderness. There is no rebound.   Musculoskeletal:         General: No tenderness or edema.   Lymphadenopathy:     She has no cervical adenopathy.   Neurological: She is alert and oriented to person, place, and time.   Skin: Skin is warm. No rash noted. She is not diaphoretic. No erythema. No pallor.   Psychiatric: She has a normal mood and affect. Her behavior is normal.   Nursing note and vitals reviewed.      Lab Results   Component Value Date    WBC 8.25 10/26/2020    HGB 12.6 10/26/2020    HCT 42.6 10/26/2020    MCV " 89 10/26/2020     (H) 10/26/2020    CO2 32 (H) 10/26/2020    CREATININE 1.2 10/26/2020    CALCIUM 10.0 10/26/2020    ALBUMIN 4.1 10/26/2020    AST 15 10/26/2020    BNP 93 10/26/2020    ALT 11 10/26/2020       Lab Results   Component Value Date    INR 1.2 12/23/2019    INR 1.4 (H) 12/22/2019    INR 1.3 (H) 12/21/2019       BNP   Date Value Ref Range Status   10/26/2020 93 0 - 99 pg/mL Final     Comment:     Values of less than 100 pg/ml are consistent with non-CHF populations.   09/28/2020 60 0 - 99 pg/mL Final     Comment:     Values of less than 100 pg/ml are consistent with non-CHF populations.   08/25/2020 49 0 - 99 pg/mL Final     Comment:     Values of less than 100 pg/ml are consistent with non-CHF populations.     Tacrolimus Lvl   Date Value Ref Range Status   10/26/2020 10.1 5.0 - 15.0 ng/mL Final     Comment:     Testing performed by Liquid Chromatography-Tandem  Mass Spectrometry (LC-MS/MS).  This test was developed and its performance characteristics  determined by [facility], Department of Pathology  and Laboratory Medicine in a manner consistent with CLIA  requirements. It has not been cleared or approved by the [place]  Food and Drug Administration.  This test is used for clinical   purposes.  It should not be regarded as investigational or for  research.         Assessment:     1. Heart transplanted    2. Immunosuppression    3. 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 Thenar muscle atrophy of right hand        Plan:   Patient doing well, tolerated hand surgery well on the right one, will undergo left hand surgery later this year.   Annual evaluation coming up soon, will get this all scheduled and go from there.   Start cardiac rehab if able to.   Return instructions as set forth by post transplant schedule or as needed:    Clinic: Return for labs and/or biopsy weekly the first month, every two weeks during month 2 and then monthly for the first year  at the provider or coordinator's discretion. During the second year, return to clinic every 3 months. Post transplant year 3-5 return every 6 months. There will be a comprehensive post transplant evaluation every year that may include LHC/RHC/biopsy, stress test, echo, CXR, and other health screening exams.    In addition to the clinical assessment, I have ordered Allomap testing for this patient to assist in identification of moderate/severe acute cellular rejection (ACR) in a pt with stable Allograft function instead of endomyocardial biopsy.     Patient is reminded to call with any health changes as these can be early signs of transplant complications. Patient is advised to make sure any new medications or changes of old medications are discussed with a pharmacist or physician knowledgeable with transplant to avoid rejection/drug toxicity related to significant drug interactions.    UNOS Patient Status  Functional Status: 90% - Able to carry on normal activity: minor symptoms of disease  Physical Capacity: No Limitations  Working for Income: No  If no, reason not working: Demands of Treatment    Miriam Prieto MD

## 2020-11-11 ENCOUNTER — TELEPHONE (OUTPATIENT)
Dept: TRANSPLANT | Facility: CLINIC | Age: 50
End: 2020-11-11

## 2020-11-11 NOTE — TELEPHONE ENCOUNTER
Sent a note to pt reminding her of December annual exams such as GYN, Mammo, Derm, Eye and Dental.

## 2020-11-12 ENCOUNTER — CLINICAL SUPPORT (OUTPATIENT)
Dept: REHABILITATION | Facility: HOSPITAL | Age: 50
End: 2020-11-12
Payer: COMMERCIAL

## 2020-11-12 DIAGNOSIS — R29.898 DECREASED GRIP STRENGTH OF RIGHT HAND: ICD-10-CM

## 2020-11-12 DIAGNOSIS — Z78.9 DECREASED ACTIVITIES OF DAILY LIVING (ADL): ICD-10-CM

## 2020-11-12 DIAGNOSIS — M25.631 DECREASED RANGE OF MOTION OF RIGHT WRIST: ICD-10-CM

## 2020-11-12 PROCEDURE — 97110 THERAPEUTIC EXERCISES: CPT

## 2020-11-12 PROCEDURE — 97140 MANUAL THERAPY 1/> REGIONS: CPT

## 2020-11-12 PROCEDURE — 97112 NEUROMUSCULAR REEDUCATION: CPT

## 2020-11-12 NOTE — PROGRESS NOTES
"  Occupational Therapy Treatment Note     Date: 11/12/2020  Name: Deborah Oliva [facility] Number: [id number]    Therapy Diagnosis:   Encounter Diagnoses   Name Primary?    Decreased range of motion of right wrist     Decreased  strength of right hand     Decreased activities of daily living (ADL)      Physician: Ana Kay PA    Physician Orders: OT eval and tx  Medical Diagnosis: cubital tunnel syndrome, right; thenar muscle atrophy of left hand; thenar muscle atrophy of right hand  Evaluation Date: 10/23/2020  Insurance Authorization period Expiration: 12/31/2020  Plan of Care Expiration Period: 1/23/2020  Surgical Procedure: R ulnar nerve decompression at [facility] canal and cubital tunnel on 10/9/2020    Visit # / Visits authorized: 3/20  Canceled Visits: 2  No-Shows: 0    Time In: 1545  Time Out: 1630  Total Billable Time: 45 minutes    Precautions:  Standard and organ transplant      Subjective     Pt reports: "I've been sick so I canceled my last two apts to be on the safe side. But I've been doing my ulnar nerve glides."    she was compliant with home exercise program given last session.   Response to previous treatment: good  Functional change: improved tolerance for nerve glides, improves R wrist ROM    Pain: 0/10  Location: n/a    Objective     Right Active Range of Motion:    ROM (in degrees) Eval 10/23/2020 11/12/2020   Radial deviation 16 20   Ulnar deviation 25 35   Wrist flexion 41 76   Wrist extension 60 74     Meryl participated in therapeutic activities for her R hand for 25 minutes including:  Resisted finger adduction with yellow sponges x 20 reps with min A  Resisted finger add & abd with rubberband x 10 reps, each digit  Intrinsic + isometrics with yellow sponge x 20 reps  Composite  strengthening with blue sponge  Flicking a cotton ball    Meryl received the following manual therapy techniques for 10 minutes including:   - scar massage    Meryl participated in " neuromuscular re-education activities to improve kinesthetic, sense and proprioception for 10 minutes including:  - RUE ulnar nerve glides    Home Exercises and Education Provided     Education provided:   - desensitization activities  - Progress towards goals     Written Home Exercises Provided: Patient instructed to cont prior HEP.  Exercises were reviewed and Meryl was able to demonstrate them prior to the end of the session.  Meryl demonstrated good understanding of the HEP provided.     See EMR under Patient Instructions for exercises provided prior visit.        Assessment     Introduced ulnar nerve glides on this date. Pt with fair tolerance - will continue to address at next session and may add to HEP then.    Pt would continue to benefit from skilled OT.      Meryl is progressing well towards her goals and there are no updates to goals at this time. Pt prognosis is Good.     Pt will continue to benefit from skilled outpatient occupational therapy to address the deficits listed in the problem list on initial evaluation provide pt/family education and to maximize pt's level of independence in the home and community environment.     Anticipated barriers to occupational therapy: none    Pt's spiritual, cultural and educational needs considered and pt agreeable to plan of care and goals.    Goals:  Short Term Goals: (4 weeks)  1. Pt will be independent with HEP in 2 visits. - MET  2. Pt will be independent with ulnar nerve glides. - MET  3. Pt will increase wrist AROM by 10 degrees to enable dressing, grooming activities. - MET  4. Pt will tolerate light strengthening exercises for R hand.     Long Term Goals: (12 weeks)  1. Pt will be able to cross R digits to enable stability and coordination of R hand.  2. Pt will exhibit 60-70 degrees of wrist flexion/extension to enable independence with self-care and meal preparation. - MET  3. Pt will exhibit 40-50# R  strength to allow a firm grasp on cooking  utensils, steering wheel, etc.  4. Pt will exhibit 5-9# of functional pinch strength to allow writing, opening containers, and turning keys.  5. Pt will exhibit a decrease in FOTO limitation score of <60% which would indicate an improvement in quality of life.    Plan     Continue outpatient Occupational Therapy 1-2 times weekly to include the following interventions:      Modalities to decrease pain (moist heat, paraffin, fluidotherapy, US), scar mobilization, manual therapy/joint mobilizations, A/PROM, therapeutic exercises/activities, strengthening, desensitization/sensory re-education, orthotic fitting/fabrication/training PRN, joint protections/energry conservation/adaptive equipment/activity modification, HEP/education as well as any other treatments deemed necessary based on the patient's needs or progress.     Plan Next Visit: ulnar nerve glides; desensitization activities; intrinsic strengthening - add mild resistance    AYDEN MARADIAGA, OT

## 2020-11-13 DIAGNOSIS — Z94.1 STATUS POST HEART TRANSPLANT: ICD-10-CM

## 2020-11-16 DIAGNOSIS — Z94.1 STATUS POST HEART TRANSPLANT: ICD-10-CM

## 2020-11-17 ENCOUNTER — PATIENT MESSAGE (OUTPATIENT)
Dept: TRANSPLANT | Facility: CLINIC | Age: 50
End: 2020-11-17

## 2020-11-18 ENCOUNTER — CLINICAL SUPPORT (OUTPATIENT)
Dept: REHABILITATION | Facility: HOSPITAL | Age: 50
End: 2020-11-18
Payer: COMMERCIAL

## 2020-11-18 ENCOUNTER — TELEPHONE (OUTPATIENT)
Dept: TRANSPLANT | Facility: CLINIC | Age: 50
End: 2020-11-18

## 2020-11-18 DIAGNOSIS — M25.631 DECREASED RANGE OF MOTION OF RIGHT WRIST: ICD-10-CM

## 2020-11-18 DIAGNOSIS — Z78.9 DECREASED ACTIVITIES OF DAILY LIVING (ADL): ICD-10-CM

## 2020-11-18 DIAGNOSIS — R29.898 DECREASED GRIP STRENGTH OF RIGHT HAND: ICD-10-CM

## 2020-11-18 PROCEDURE — 97110 THERAPEUTIC EXERCISES: CPT

## 2020-11-18 PROCEDURE — 97112 NEUROMUSCULAR REEDUCATION: CPT

## 2020-11-18 PROCEDURE — 97035 APP MDLTY 1+ULTRASOUND EA 15: CPT

## 2020-11-18 PROCEDURE — 97530 THERAPEUTIC ACTIVITIES: CPT

## 2020-11-18 NOTE — PROGRESS NOTES
"  Occupational Therapy Treatment Note     Date: 11/18/2020  Name: Deborah Oliva [facility] Number: [id number]    Therapy Diagnosis:   Encounter Diagnoses   Name Primary?    Decreased range of motion of right wrist     Decreased  strength of right hand     Decreased activities of daily living (ADL)      Physician: Ana Kay PA    Physician Orders: OT eval and tx  Medical Diagnosis: cubital tunnel syndrome, right; thenar muscle atrophy of left hand; thenar muscle atrophy of right hand  Evaluation Date: 10/23/2020  Insurance Authorization period Expiration: 12/31/2020  Plan of Care Expiration Period: 1/23/2020  Surgical Procedure: R ulnar nerve decompression at [facility] canal and cubital tunnel on 10/9/2020    Visit # / Visits authorized: 4/20  Canceled Visits: 4  No-Shows: 0    Time In: 1200  Time Out: 1255  Total Billable Time: 55 minutes    Precautions:  Standard and organ transplant      Subjective     Pt reports: "I helped my aunt move the other day, and I think I overdid. I'm really sore at my elbow and wrist."    she was compliant with home exercise program given last session.   Response to previous treatment: good  Functional change: improved tolerance for nerve glides    Pain: 4/10 (sore)  Location: R elbow and hand    Objective     Meryl received the following manual therapy techniques for 5 minutes:   - scar massage    Meryl participated in neuromuscular re-education activities to improve kinesthetic, sense, and proprioception for 10 minutes, including:  - RUE ulnar nerve glides  - RUE ulnar nerve flossing    Meryl received therapeutic exercises for R hand to develop strength, endurance and ROM for 17 minutes including:  - thumb palmar add/abd AROM with joint blocking 2x10  - small finger opposition to thumb AROM 2x10  - wrist flexion with UD AROM 1x10  - ring and small finger DIP flexion AROM with joint blocking 1x10 each  - ring and small finger MP flexion AROM with joint blocking for " IP extension 1x10    Meryl participated in dynamic functional therapeutic activities to improve functional performance of her R hand for 10 minutes, including:  Resisted finger adduction with yellow sponges x 20 reps with min A (NT)  Resisted finger add & abd with rubberband x 10 reps, each digit as tolerated  Intrinsic + isometrics with yellow sponge x 20 reps  Composite  strengthening with blue sponge (NT)  Flicking a cotton ball (NT)    Meryl received the following supervised modalities after being cleared for contradictions for 8 minutes including:  Ultrasound:  3 Mhz, 100% duty cycle, 2.1 w/cm2 for 5 minutes to L elbow to decrease pain &    3 Mhz, 50% duty cycle, 1.8 w/cm2 for 3 minutes to L wrist to decrease pain     Meryl participated in orthotic management and training for 5 minutes, including:  - fitted for Oval-8 splints for L index and ring PIP joint to promote extension      Home Exercises and Education Provided     Education provided:   - use of Oval-8 splints and protecting L index and ring PIP joints  - Progress towards goals     Written Home Exercises Provided: Patient instructed to cont prior HEP.  Exercises were reviewed and Meryl was able to demonstrate them prior to the end of the session.  Meryl demonstrated good understanding of the HEP provided.     See EMR under Patient Instructions for exercises provided prior visit.        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     Pt would continue to benefit from skilled OT.      Meryl is progressing well towards her goals and there are no updates to goals at this time. Pt prognosis is Good.     Pt will continue to benefit from skilled outpatient occupational therapy to address the deficits listed in the problem list on initial evaluation provide pt/family education and to maximize pt's level of independence in the home and community  environment.     Anticipated barriers to occupational therapy: none    Pt's spiritual, cultural and educational needs considered and pt agreeable to plan of care and goals.    Goals:  Short Term Goals: (4 weeks)  1. Pt will be independent with HEP in 2 visits. - MET  2. Pt will be independent with ulnar nerve glides. - MET  3. Pt will increase wrist AROM by 10 degrees to enable dressing, grooming activities. - MET  4. Pt will tolerate light strengthening exercises for R hand.     Long Term Goals: (12 weeks)  1. Pt will be able to cross R digits to enable stability and coordination of R hand.  2. Pt will exhibit 60-70 degrees of wrist flexion/extension to enable independence with self-care and meal preparation. - MET  3. Pt will exhibit 40-50# R  strength to allow a firm grasp on cooking utensils, steering wheel, etc.  4. Pt will exhibit 5-9# of functional pinch strength to allow writing, opening containers, and turning keys.  5. Pt will exhibit a decrease in FOTO limitation score of <60% which would indicate an improvement in quality of life.    Plan     Continue outpatient Occupational Therapy 1-2 times weekly to include the following interventions:      Modalities to decrease pain (moist heat, paraffin, fluidotherapy, US), scar mobilization, manual therapy/joint mobilizations, A/PROM, therapeutic exercises/activities, strengthening, desensitization/sensory re-education, orthotic fitting/fabrication/training PRN, joint protections/energry conservation/adaptive equipment/activity modification, HEP/education as well as any other treatments deemed necessary based on the patient's needs or progress.     Plan Next Visit: ulnar nerve glides; intrinsic strengthening - add mild resistance    AYDEN MARADIAGA OT

## 2020-11-19 ENCOUNTER — OFFICE VISIT (OUTPATIENT)
Dept: ORTHOPEDICS | Facility: CLINIC | Age: 50
End: 2020-11-19
Payer: COMMERCIAL

## 2020-11-19 VITALS
SYSTOLIC BLOOD PRESSURE: 131 MMHG | WEIGHT: 211 LBS | HEIGHT: 67 IN | BODY MASS INDEX: 33.12 KG/M2 | DIASTOLIC BLOOD PRESSURE: 81 MMHG

## 2020-11-19 DIAGNOSIS — Z47.89 ORTHOPEDIC AFTERCARE: ICD-10-CM

## 2020-11-19 DIAGNOSIS — G56.23 ULNAR NEUROPATHY OF BOTH UPPER EXTREMITIES: Primary | ICD-10-CM

## 2020-11-19 PROCEDURE — 3008F PR BODY MASS INDEX (BMI) DOCUMENTED: ICD-10-PCS | Mod: CPTII,S$GLB,, | Performed by: PHYSICIAN ASSISTANT

## 2020-11-19 PROCEDURE — 99024 PR POST-OP FOLLOW-UP VISIT: ICD-10-PCS | Mod: S$GLB,,, | Performed by: PHYSICIAN ASSISTANT

## 2020-11-19 PROCEDURE — 99999 PR PBB SHADOW E&M-EST. PATIENT-LVL IV: CPT | Mod: PBBFAC,,, | Performed by: PHYSICIAN ASSISTANT

## 2020-11-19 PROCEDURE — 3008F BODY MASS INDEX DOCD: CPT | Mod: CPTII,S$GLB,, | Performed by: PHYSICIAN ASSISTANT

## 2020-11-19 PROCEDURE — 99999 PR PBB SHADOW E&M-EST. PATIENT-LVL IV: ICD-10-PCS | Mod: PBBFAC,,, | Performed by: PHYSICIAN ASSISTANT

## 2020-11-19 PROCEDURE — 1125F AMNT PAIN NOTED PAIN PRSNT: CPT | Mod: S$GLB,,, | Performed by: PHYSICIAN ASSISTANT

## 2020-11-19 PROCEDURE — 99024 POSTOP FOLLOW-UP VISIT: CPT | Mod: S$GLB,,, | Performed by: PHYSICIAN ASSISTANT

## 2020-11-19 PROCEDURE — 1125F PR PAIN SEVERITY QUANTIFIED, PAIN PRESENT: ICD-10-PCS | Mod: S$GLB,,, | Performed by: PHYSICIAN ASSISTANT

## 2020-11-19 NOTE — PROGRESS NOTES
"Ms. Navas is here today for a post-operative visit.  She is 41 days status post right ulnar nerve decompression at [facility]'s canal and cubital tunnel by Dr. Vasques on 10/9/2020. She reports that she is doing well.  Pain is mild. She is regularly attending OT and performing HEP, reports improved motion. She is using the right hand for more ADLs as she increases her therapy.   She denies fever, chills, and sweats since the time of the surgery.     Physical exam:    Vitals:    11/19/20 1414   BP: 131/81   Weight: 95.7 kg (211 lb)   Height: 5' 7" (1.702 m)   PainSc:   4     Vital signs are stable, patient is afebrile.  Patient is well dressed and well groomed, no acute distress.  Alert and oriented to person, place, and time.  Incision is healing well - clean, dry, and intact.  There is no erythema or exudate.  There is no sign of any infection. She is NVI- decreased ulnar nerve sensation bilaterally, decreased median sensation bilaterally.  Improving motor function of the ulnar nerve, able to cross fingers but persistent weakness.  Good finger, wrist, and elbow ROM. Mild discomfort with wrist hyperextension.    EMG 9/17/20  IMPRESSION  1. ABNORMAL study  2. There is electrodiagnostic evidence of a MODERATE-SEVERE demyelinating and axonal ulnar neuropathy (Cubital tunnel syndrome) across BILATERAL elbows-slightly worse on the RIGHT      Assessment: 41 days status post right ulnar nerve decompression at [facility]'s canal and cubital tunnel    Plan:  Deborah was seen today for post-op evaluation.    Diagnoses and all orders for this visit:    Ulnar neuropathy of both upper extremities    Orthopedic aftercare        - Regular OT and HEP  - Continue scar massage  - She would like to have the left ulnar nerve decompression before January, discuss that she will need good right hand function before undergoing left UE surgery  - Discussed nerve healing timeline  - Follow up to discuss surgery timeline with Dr. Vasques  - " Call with questions or concerns

## 2020-11-25 ENCOUNTER — PATIENT MESSAGE (OUTPATIENT)
Dept: TRANSPLANT | Facility: CLINIC | Age: 50
End: 2020-11-25

## 2020-11-25 DIAGNOSIS — Z94.1 STATUS POST HEART TRANSPLANT: ICD-10-CM

## 2020-11-25 RX ORDER — AMOXICILLIN 500 MG/1
CAPSULE ORAL
Qty: 14 CAPSULE | Refills: 3 | Status: SHIPPED | OUTPATIENT
Start: 2020-11-25 | End: 2020-11-25 | Stop reason: SDUPTHER

## 2020-11-25 RX ORDER — AMOXICILLIN 500 MG/1
CAPSULE ORAL
Qty: 14 CAPSULE | Refills: 0 | Status: SHIPPED | OUTPATIENT
Start: 2020-11-25 | End: 2020-12-02 | Stop reason: SDUPTHER

## 2020-11-25 NOTE — TELEPHONE ENCOUNTER
Pt is having gum and teeth pain. Worried about abscess. Can't see dentist until Monday. Asked Dr. Chaudhari for order for antibiotics until she is seen. Ordered Amoxicillin and urged pt to try and get seen sooner.

## 2020-11-27 ENCOUNTER — TELEPHONE (OUTPATIENT)
Dept: ORTHOPEDICS | Facility: CLINIC | Age: 50
End: 2020-11-27

## 2020-11-27 NOTE — TELEPHONE ENCOUNTER
Left patient a voicemail reminder of their scheduled appointment on 12/01/20. If the patient has any questions we can be reached at [phone].

## 2020-11-30 ENCOUNTER — LAB VISIT (OUTPATIENT)
Dept: LAB | Facility: HOSPITAL | Age: 50
End: 2020-11-30
Attending: FAMILY MEDICINE
Payer: COMMERCIAL

## 2020-11-30 ENCOUNTER — PATIENT MESSAGE (OUTPATIENT)
Dept: TRANSPLANT | Facility: CLINIC | Age: 50
End: 2020-11-30

## 2020-11-30 DIAGNOSIS — Z94.1 STATUS POST HEART TRANSPLANT: ICD-10-CM

## 2020-11-30 DIAGNOSIS — Z79.899 ENCOUNTER FOR LONG-TERM (CURRENT) USE OF MEDICATIONS: ICD-10-CM

## 2020-11-30 DIAGNOSIS — E03.8 OTHER SPECIFIED HYPOTHYROIDISM: ICD-10-CM

## 2020-11-30 DIAGNOSIS — E11.22 TYPE 2 DIABETES MELLITUS WITH STAGE 3 CHRONIC KIDNEY DISEASE, WITHOUT LONG-TERM CURRENT USE OF INSULIN: ICD-10-CM

## 2020-11-30 DIAGNOSIS — N18.4 CKD (CHRONIC KIDNEY DISEASE), STAGE IV: Primary | ICD-10-CM

## 2020-11-30 DIAGNOSIS — N18.30 TYPE 2 DIABETES MELLITUS WITH STAGE 3 CHRONIC KIDNEY DISEASE, WITHOUT LONG-TERM CURRENT USE OF INSULIN: ICD-10-CM

## 2020-11-30 DIAGNOSIS — E78.2 MIXED HYPERLIPIDEMIA: ICD-10-CM

## 2020-11-30 DIAGNOSIS — N28.9 RENAL INSUFFICIENCY: ICD-10-CM

## 2020-11-30 DIAGNOSIS — T86.298 OTHER COMPLICATION OF HEART TRANSPLANT: ICD-10-CM

## 2020-11-30 LAB
ALBUMIN SERPL BCP-MCNC: 4.4 G/DL (ref 3.5–5.2)
ALP SERPL-CCNC: 72 U/L (ref 55–135)
ALT SERPL W/O P-5'-P-CCNC: 10 U/L (ref 10–44)
ANION GAP SERPL CALC-SCNC: 15 MMOL/L (ref 8–16)
AST SERPL-CCNC: 14 U/L (ref 10–40)
BASOPHILS # BLD AUTO: 0.04 K/UL (ref 0–0.2)
BASOPHILS NFR BLD: 0.6 % (ref 0–1.9)
BILIRUB SERPL-MCNC: 0.8 MG/DL (ref 0.1–1)
BNP SERPL-MCNC: 25 PG/ML (ref 0–99)
BUN SERPL-MCNC: 31 MG/DL (ref 6–20)
CALCIUM SERPL-MCNC: 9.7 MG/DL (ref 8.7–10.5)
CHLORIDE SERPL-SCNC: 100 MMOL/L (ref 95–110)
CHOLEST SERPL-MCNC: 126 MG/DL (ref 120–199)
CHOLEST/HDLC SERPL: 2.4 {RATIO} (ref 2–5)
CO2 SERPL-SCNC: 27 MMOL/L (ref 23–29)
CREAT SERPL-MCNC: 1.4 MG/DL (ref 0.5–1.4)
DIFFERENTIAL METHOD: ABNORMAL
EOSINOPHIL # BLD AUTO: 0.1 K/UL (ref 0–0.5)
EOSINOPHIL NFR BLD: 1.9 % (ref 0–8)
ERYTHROCYTE [DISTWIDTH] IN BLOOD BY AUTOMATED COUNT: 15.6 % (ref 11.5–14.5)
EST. GFR  (AFRICAN AMERICAN): 50.2 ML/MIN/1.73 M^2
EST. GFR  (NON AFRICAN AMERICAN): 43.5 ML/MIN/1.73 M^2
ESTIMATED AVG GLUCOSE: 74 MG/DL (ref 68–131)
GLUCOSE SERPL-MCNC: 124 MG/DL (ref 70–110)
HBA1C MFR BLD HPLC: 4.2 % (ref 4–5.6)
HCT VFR BLD AUTO: 36.4 % (ref 37–48.5)
HDLC SERPL-MCNC: 52 MG/DL (ref 40–75)
HDLC SERPL: 41.3 % (ref 20–50)
HGB BLD-MCNC: 10.5 G/DL (ref 12–16)
IMM GRANULOCYTES # BLD AUTO: 0.02 K/UL (ref 0–0.04)
IMM GRANULOCYTES NFR BLD AUTO: 0.3 % (ref 0–0.5)
LDLC SERPL CALC-MCNC: 56.4 MG/DL (ref 63–159)
LYMPHOCYTES # BLD AUTO: 0.9 K/UL (ref 1–4.8)
LYMPHOCYTES NFR BLD: 13.6 % (ref 18–48)
MAGNESIUM SERPL-MCNC: 1.8 MG/DL (ref 1.6–2.6)
MCH RBC QN AUTO: 27.3 PG (ref 27–31)
MCHC RBC AUTO-ENTMCNC: 28.8 G/DL (ref 32–36)
MCV RBC AUTO: 95 FL (ref 82–98)
MONOCYTES # BLD AUTO: 0.5 K/UL (ref 0.3–1)
MONOCYTES NFR BLD: 8 % (ref 4–15)
NEUTROPHILS # BLD AUTO: 5.1 K/UL (ref 1.8–7.7)
NEUTROPHILS NFR BLD: 75.6 % (ref 38–73)
NONHDLC SERPL-MCNC: 74 MG/DL
NRBC BLD-RTO: 0 /100 WBC
PLATELET # BLD AUTO: 372 K/UL (ref 150–350)
PMV BLD AUTO: 9.6 FL (ref 9.2–12.9)
POTASSIUM SERPL-SCNC: 3.2 MMOL/L (ref 3.5–5.1)
PROT SERPL-MCNC: 7 G/DL (ref 6–8.4)
RBC # BLD AUTO: 3.85 M/UL (ref 4–5.4)
SODIUM SERPL-SCNC: 142 MMOL/L (ref 136–145)
T4 SERPL-MCNC: 8.6 UG/DL (ref 4.5–11.5)
TRIGL SERPL-MCNC: 88 MG/DL (ref 30–150)
TSH SERPL DL<=0.005 MIU/L-ACNC: 2.46 UIU/ML (ref 0.4–4)
WBC # BLD AUTO: 6.74 K/UL (ref 3.9–12.7)

## 2020-11-30 PROCEDURE — 85025 COMPLETE CBC W/AUTO DIFF WBC: CPT

## 2020-11-30 PROCEDURE — 36415 COLL VENOUS BLD VENIPUNCTURE: CPT

## 2020-11-30 PROCEDURE — 84436 ASSAY OF TOTAL THYROXINE: CPT

## 2020-11-30 PROCEDURE — 86833 HLA CLASS II HIGH DEFIN QUAL: CPT | Mod: 59

## 2020-11-30 PROCEDURE — 86832 HLA CLASS I HIGH DEFIN QUAL: CPT | Mod: 59

## 2020-11-30 PROCEDURE — 86833 HLA CLASS II HIGH DEFIN QUAL: CPT

## 2020-11-30 PROCEDURE — 86977 RBC SERUM PRETX INCUBJ/INHIB: CPT

## 2020-11-30 PROCEDURE — 80053 COMPREHEN METABOLIC PANEL: CPT

## 2020-11-30 PROCEDURE — 83036 HEMOGLOBIN GLYCOSYLATED A1C: CPT

## 2020-11-30 PROCEDURE — 83880 ASSAY OF NATRIURETIC PEPTIDE: CPT

## 2020-11-30 PROCEDURE — 80061 LIPID PANEL: CPT

## 2020-11-30 PROCEDURE — 86832 HLA CLASS I HIGH DEFIN QUAL: CPT

## 2020-11-30 PROCEDURE — 83735 ASSAY OF MAGNESIUM: CPT

## 2020-11-30 PROCEDURE — 84443 ASSAY THYROID STIM HORMONE: CPT

## 2020-11-30 PROCEDURE — 80197 ASSAY OF TACROLIMUS: CPT

## 2020-12-01 ENCOUNTER — OFFICE VISIT (OUTPATIENT)
Dept: ORTHOPEDICS | Facility: CLINIC | Age: 50
End: 2020-12-01
Payer: COMMERCIAL

## 2020-12-01 ENCOUNTER — TELEPHONE (OUTPATIENT)
Dept: TRANSPLANT | Facility: CLINIC | Age: 50
End: 2020-12-01

## 2020-12-01 VITALS
WEIGHT: 211 LBS | SYSTOLIC BLOOD PRESSURE: 122 MMHG | HEIGHT: 67 IN | DIASTOLIC BLOOD PRESSURE: 83 MMHG | BODY MASS INDEX: 33.12 KG/M2 | HEART RATE: 123 BPM

## 2020-12-01 DIAGNOSIS — G56.23 CUBITAL TUNNEL SYNDROME, BILATERAL: Primary | ICD-10-CM

## 2020-12-01 DIAGNOSIS — Z01.818 PRE-OP TESTING: Primary | ICD-10-CM

## 2020-12-01 DIAGNOSIS — G56.23 ULNAR NEUROPATHY OF BOTH UPPER EXTREMITIES: ICD-10-CM

## 2020-12-01 LAB
CLASS I ANTIBODY COMMENTS - LUMINEX: NORMAL
CLASS II ANTIBODY COMMENTS - LUMINEX: NORMAL
DSA1 TESTING DATE: NORMAL
DSA12 TESTING DATE: NORMAL
DSA2 TESTING DATE: NORMAL
SERUM COLLECTION DT - LUMINEX CLASS I: NORMAL
SERUM COLLECTION DT - LUMINEX CLASS II: NORMAL
TACROLIMUS BLD-MCNC: 10.6 NG/ML (ref 5–15)

## 2020-12-01 PROCEDURE — 1126F PR PAIN SEVERITY QUANTIFIED, NO PAIN PRESENT: ICD-10-PCS | Mod: S$GLB,,, | Performed by: ORTHOPAEDIC SURGERY

## 2020-12-01 PROCEDURE — 3074F SYST BP LT 130 MM HG: CPT | Mod: CPTII,S$GLB,, | Performed by: ORTHOPAEDIC SURGERY

## 2020-12-01 PROCEDURE — 3079F PR MOST RECENT DIASTOLIC BLOOD PRESSURE 80-89 MM HG: ICD-10-PCS | Mod: CPTII,S$GLB,, | Performed by: ORTHOPAEDIC SURGERY

## 2020-12-01 PROCEDURE — 1126F AMNT PAIN NOTED NONE PRSNT: CPT | Mod: S$GLB,,, | Performed by: ORTHOPAEDIC SURGERY

## 2020-12-01 PROCEDURE — 3008F BODY MASS INDEX DOCD: CPT | Mod: CPTII,S$GLB,, | Performed by: ORTHOPAEDIC SURGERY

## 2020-12-01 PROCEDURE — 3074F PR MOST RECENT SYSTOLIC BLOOD PRESSURE < 130 MM HG: ICD-10-PCS | Mod: CPTII,S$GLB,, | Performed by: ORTHOPAEDIC SURGERY

## 2020-12-01 PROCEDURE — 99214 OFFICE O/P EST MOD 30 MIN: CPT | Mod: 24,S$GLB,, | Performed by: ORTHOPAEDIC SURGERY

## 2020-12-01 PROCEDURE — 99999 PR PBB SHADOW E&M-EST. PATIENT-LVL III: ICD-10-PCS | Mod: PBBFAC,,, | Performed by: ORTHOPAEDIC SURGERY

## 2020-12-01 PROCEDURE — 99999 PR PBB SHADOW E&M-EST. PATIENT-LVL III: CPT | Mod: PBBFAC,,, | Performed by: ORTHOPAEDIC SURGERY

## 2020-12-01 PROCEDURE — 3079F DIAST BP 80-89 MM HG: CPT | Mod: CPTII,S$GLB,, | Performed by: ORTHOPAEDIC SURGERY

## 2020-12-01 PROCEDURE — 99214 PR OFFICE/OUTPT VISIT, EST, LEVL IV, 30-39 MIN: ICD-10-PCS | Mod: 24,S$GLB,, | Performed by: ORTHOPAEDIC SURGERY

## 2020-12-01 PROCEDURE — 3008F PR BODY MASS INDEX (BMI) DOCUMENTED: ICD-10-PCS | Mod: CPTII,S$GLB,, | Performed by: ORTHOPAEDIC SURGERY

## 2020-12-01 NOTE — PROGRESS NOTES
"Ms. Navas is here today for evaluation of her L arm. She has known ulnar nerve compression at the elbow and wrist on the left side. She is also s/p ulnar nerve decompression of the RUE at the elbow and wrist. We have been waiting on adequate function to be regained in the RUE before operating on her LUE. She states that she is doing all ADLs with her RUE. She is RHD. She wishes to schedule surgery at this time.     Physical exam:    Vitals:    12/01/20 1447   BP: 122/83   Pulse: (!) 123   Weight: 95.7 kg (210 lb 15.7 oz)   Height: 5' 7" (1.702 m)   PainSc: 0-No pain     Vital signs are stable, patient is afebrile.  Patient is well dressed and well groomed, no acute distress.  Alert and oriented to person, place, and time.  Incision is healing well - clean, dry, and intact.  There is no erythema or exudate.  There is no sign of any infection. She is NVI- decreased ulnar nerve sensation bilaterally, decreased median sensation bilaterally.  Improving motor function of the ulnar nerve, able to cross fingers but persistent weakness.  Good finger, wrist, and elbow ROM. Mild discomfort with wrist hyperextension.    EMG 9/17/20  IMPRESSION  1. ABNORMAL study  2. There is electrodiagnostic evidence of a MODERATE-SEVERE demyelinating and axonal ulnar neuropathy (Cubital tunnel syndrome) across BILATERAL elbows-slightly worse on the RIGHT      Assessment: 41 days status post right ulnar nerve decompression at [facility]'s canal and cubital tunnel    Plan:  Deborah was seen today for pre-op exam.    Diagnoses and all orders for this visit:    Cubital tunnel syndrome, bilateral    patient is ready for LUE ulnar nerve decompression at the elbow and wrist. She was consented for surgery today in clinic. Plan for surgery on 12/18/20.               "

## 2020-12-02 ENCOUNTER — HOSPITAL ENCOUNTER (OUTPATIENT)
Dept: RADIOLOGY | Facility: HOSPITAL | Age: 50
Discharge: HOME | End: 2020-12-02
Attending: INTERNAL MEDICINE
Payer: COMMERCIAL

## 2020-12-02 ENCOUNTER — LAB VISIT (OUTPATIENT)
Dept: INTERNAL MEDICINE | Facility: CLINIC | Age: 50
End: 2020-12-02
Payer: COMMERCIAL

## 2020-12-02 ENCOUNTER — PATIENT MESSAGE (OUTPATIENT)
Dept: TRANSPLANT | Facility: CLINIC | Age: 50
End: 2020-12-02

## 2020-12-02 ENCOUNTER — INITIAL CONSULT (OUTPATIENT)
Dept: CARDIOLOGY | Facility: CLINIC | Age: 50
End: 2020-12-02
Payer: COMMERCIAL

## 2020-12-02 ENCOUNTER — TELEPHONE (OUTPATIENT)
Dept: CARDIOLOGY | Facility: CLINIC | Age: 50
End: 2020-12-02

## 2020-12-02 ENCOUNTER — HOSPITAL ENCOUNTER (OUTPATIENT)
Dept: CARDIOLOGY | Facility: HOSPITAL | Age: 50
Discharge: HOME | End: 2020-12-02
Attending: INTERNAL MEDICINE
Payer: COMMERCIAL

## 2020-12-02 ENCOUNTER — OFFICE VISIT (OUTPATIENT)
Dept: TRANSPLANT | Facility: CLINIC | Age: 50
End: 2020-12-02
Attending: INTERNAL MEDICINE
Payer: COMMERCIAL

## 2020-12-02 VITALS
HEART RATE: 120 BPM | SYSTOLIC BLOOD PRESSURE: 120 MMHG | WEIGHT: 210 LBS | DIASTOLIC BLOOD PRESSURE: 80 MMHG | BODY MASS INDEX: 32.96 KG/M2 | HEIGHT: 67 IN

## 2020-12-02 VITALS
DIASTOLIC BLOOD PRESSURE: 82 MMHG | HEIGHT: 68 IN | HEART RATE: 113 BPM | WEIGHT: 209 LBS | SYSTOLIC BLOOD PRESSURE: 131 MMHG | BODY MASS INDEX: 31.67 KG/M2

## 2020-12-02 VITALS
WEIGHT: 208.13 LBS | HEART RATE: 106 BPM | DIASTOLIC BLOOD PRESSURE: 76 MMHG | HEIGHT: 69 IN | BODY MASS INDEX: 30.83 KG/M2 | OXYGEN SATURATION: 95 % | SYSTOLIC BLOOD PRESSURE: 126 MMHG

## 2020-12-02 DIAGNOSIS — N18.2 STAGE 2 CHRONIC KIDNEY DISEASE: ICD-10-CM

## 2020-12-02 DIAGNOSIS — D50.0 IRON DEFICIENCY ANEMIA DUE TO CHRONIC BLOOD LOSS: ICD-10-CM

## 2020-12-02 DIAGNOSIS — R91.8 ABNORMALITY OF LUNG ON CXR: ICD-10-CM

## 2020-12-02 DIAGNOSIS — I42.5 RESTRICTIVE CARDIOMYOPATHY: ICD-10-CM

## 2020-12-02 DIAGNOSIS — T86.298 OTHER COMPLICATION OF HEART TRANSPLANT: Primary | ICD-10-CM

## 2020-12-02 DIAGNOSIS — T86.298 OTHER COMPLICATION OF HEART TRANSPLANT: ICD-10-CM

## 2020-12-02 DIAGNOSIS — E78.5 HYPERLIPIDEMIA LDL GOAL <100: ICD-10-CM

## 2020-12-02 DIAGNOSIS — R25.1 TREMOR: ICD-10-CM

## 2020-12-02 DIAGNOSIS — E04.2 MULTIPLE THYROID NODULES: Primary | ICD-10-CM

## 2020-12-02 DIAGNOSIS — Z94.1 HEART TRANSPLANTED: Primary | ICD-10-CM

## 2020-12-02 DIAGNOSIS — Z79.899 ENCOUNTER FOR LONG-TERM (CURRENT) USE OF MEDICATIONS: ICD-10-CM

## 2020-12-02 DIAGNOSIS — E66.09 CLASS 1 OBESITY DUE TO EXCESS CALORIES WITH SERIOUS COMORBIDITY AND BODY MASS INDEX (BMI) OF 31.0 TO 31.9 IN ADULT: ICD-10-CM

## 2020-12-02 DIAGNOSIS — Z94.1 STATUS POST HEART TRANSPLANT: ICD-10-CM

## 2020-12-02 DIAGNOSIS — R06.02 SOB (SHORTNESS OF BREATH) ON EXERTION: ICD-10-CM

## 2020-12-02 DIAGNOSIS — N18.31 STAGE 3A CHRONIC KIDNEY DISEASE: ICD-10-CM

## 2020-12-02 DIAGNOSIS — I25.10 CAD (CORONARY ARTERY DISEASE): ICD-10-CM

## 2020-12-02 DIAGNOSIS — E78.2 MIXED HYPERLIPIDEMIA: ICD-10-CM

## 2020-12-02 DIAGNOSIS — D86.85 CARDIAC SARCOIDOSIS: ICD-10-CM

## 2020-12-02 DIAGNOSIS — E04.2 MULTINODULAR GOITER: ICD-10-CM

## 2020-12-02 DIAGNOSIS — G56.23 CUBITAL TUNNEL SYNDROME, BILATERAL: ICD-10-CM

## 2020-12-02 DIAGNOSIS — D84.9 IMMUNOSUPPRESSION: ICD-10-CM

## 2020-12-02 PROBLEM — R29.898 POOR FINE MOTOR SKILLS: Status: RESOLVED | Noted: 2020-01-31 | Resolved: 2020-12-02

## 2020-12-02 PROBLEM — R11.0 NAUSEA: Status: RESOLVED | Noted: 2019-12-09 | Resolved: 2020-12-02

## 2020-12-02 PROBLEM — T81.89XA LYMPHOCELE AFTER SURGICAL PROCEDURE: Status: RESOLVED | Noted: 2020-02-26 | Resolved: 2020-12-02

## 2020-12-02 PROBLEM — I89.8 LYMPHOCELE AFTER SURGICAL PROCEDURE: Status: RESOLVED | Noted: 2020-02-26 | Resolved: 2020-12-02

## 2020-12-02 PROBLEM — R55 POSTURAL DIZZINESS WITH PRESYNCOPE: Status: RESOLVED | Noted: 2020-01-20 | Resolved: 2020-12-02

## 2020-12-02 PROBLEM — M62.81 MUSCLE WEAKNESS OF LOWER EXTREMITY: Status: RESOLVED | Noted: 2020-01-31 | Resolved: 2020-12-02

## 2020-12-02 PROBLEM — E66.811 CLASS 1 OBESITY DUE TO EXCESS CALORIES WITH SERIOUS COMORBIDITY AND BODY MASS INDEX (BMI) OF 31.0 TO 31.9 IN ADULT: Status: ACTIVE | Noted: 2020-12-02

## 2020-12-02 PROBLEM — R53.81 PHYSICAL DECONDITIONING: Status: RESOLVED | Noted: 2020-01-31 | Resolved: 2020-12-02

## 2020-12-02 PROBLEM — R42 POSTURAL DIZZINESS WITH PRESYNCOPE: Status: RESOLVED | Noted: 2020-01-20 | Resolved: 2020-12-02

## 2020-12-02 PROBLEM — Z78.9 DECREASED ACTIVITIES OF DAILY LIVING (ADL): Status: RESOLVED | Noted: 2020-10-23 | Resolved: 2020-12-02

## 2020-12-02 PROBLEM — R73.9 STRESS HYPERGLYCEMIA: Status: RESOLVED | Noted: 2019-12-16 | Resolved: 2020-12-02

## 2020-12-02 PROBLEM — Z78.9 IMPAIRED INSTRUMENTAL ACTIVITIES OF DAILY LIVING (IADL): Status: RESOLVED | Noted: 2020-01-31 | Resolved: 2020-12-02

## 2020-12-02 PROBLEM — I89.8 LYMPHOCELE: Status: RESOLVED | Noted: 2020-05-27 | Resolved: 2020-12-02

## 2020-12-02 LAB
ASCENDING AORTA: 2.85 CM
AV INDEX (PROSTH): 0.73
AV MEAN GRADIENT: 5 MMHG
AV PEAK GRADIENT: 10 MMHG
AV VALVE AREA: 2.08 CM2
AV VELOCITY RATIO: 0.63
BSA FOR ECHO PROCEDURE: 2.12 M2
CMV DNA SERPL NAA+PROBE-ACNC: NORMAL IU/ML
CV ECHO LV RWT: 0.25 CM
DOP CALC AO PEAK VEL: 1.6 M/S
DOP CALC AO VTI: 24.93 CM
DOP CALC LVOT AREA: 2.8 CM2
DOP CALC LVOT DIAMETER: 1.9 CM
DOP CALC LVOT PEAK VEL: 1.01 M/S
DOP CALC LVOT STROKE VOLUME: 51.86 CM3
DOP CALCLVOT PEAK VEL VTI: 18.3 CM
E/E' RATIO: 7.09 M/S
ECHO LV POSTERIOR WALL: 0.6 CM (ref 0.6–1.1)
FRACTIONAL SHORTENING: 45 % (ref 28–44)
INTERVENTRICULAR SEPTUM: 0.6 CM (ref 0.6–1.1)
IVRT: 59.94 MSEC
LEFT ATRIUM SIZE: 3.59 CM
LEFT INTERNAL DIMENSION IN SYSTOLE: 2.63 CM (ref 2.1–4)
LEFT VENTRICLE DIASTOLIC VOLUME INDEX: 40.55 ML/M2
LEFT VENTRICLE DIASTOLIC VOLUME: 83.72 ML
LEFT VENTRICLE MASS INDEX: 43 G/M2
LEFT VENTRICLE SYSTOLIC VOLUME INDEX: 12.2 ML/M2
LEFT VENTRICLE SYSTOLIC VOLUME: 25.21 ML
LEFT VENTRICULAR INTERNAL DIMENSION IN DIASTOLE: 4.8 CM (ref 3.5–6)
LEFT VENTRICULAR MASS: 88.3 G
LV LATERAL E/E' RATIO: 6.5 M/S
LV SEPTAL E/E' RATIO: 7.8 M/S
MV PEAK E VEL: 0.78 M/S
PISA TR MAX VEL: 2.45 M/S
PULM VEIN S/D RATIO: 0.46
PV PEAK D VEL: 0.76 M/S
PV PEAK S VEL: 0.35 M/S
RA PRESSURE: 3 MMHG
RIGHT VENTRICULAR END-DIASTOLIC DIMENSION: 3.38 CM
RV TISSUE DOPPLER FREE WALL SYSTOLIC VELOCITY 1 (APICAL 4 CHAMBER VIEW): 9.85 CM/S
SINUS: 3.04 CM
STJ: 2.66 CM
TDI LATERAL: 0.12 M/S
TDI SEPTAL: 0.1 M/S
TDI: 0.11 M/S
TR MAX PG: 24 MMHG
TRICUSPID ANNULAR PLANE SYSTOLIC EXCURSION: 1.26 CM
TV REST PULMONARY ARTERY PRESSURE: 27 MMHG

## 2020-12-02 PROCEDURE — 1126F AMNT PAIN NOTED NONE PRSNT: CPT | Mod: S$GLB,,, | Performed by: INTERNAL MEDICINE

## 2020-12-02 PROCEDURE — 93306 TTE W/DOPPLER COMPLETE: CPT

## 2020-12-02 PROCEDURE — 93306 TTE W/DOPPLER COMPLETE: CPT | Mod: 26,,, | Performed by: INTERNAL MEDICINE

## 2020-12-02 PROCEDURE — 3079F PR MOST RECENT DIASTOLIC BLOOD PRESSURE 80-89 MM HG: ICD-10-PCS | Mod: CPTII,S$GLB,, | Performed by: INTERNAL MEDICINE

## 2020-12-02 PROCEDURE — 71046 X-RAY EXAM CHEST 2 VIEWS: CPT | Mod: TC,FY

## 2020-12-02 PROCEDURE — 99999 PR PBB SHADOW E&M-EST. PATIENT-LVL IV: CPT | Mod: PBBFAC,,, | Performed by: INTERNAL MEDICINE

## 2020-12-02 PROCEDURE — 71046 X-RAY EXAM CHEST 2 VIEWS: CPT | Mod: 26,,, | Performed by: RADIOLOGY

## 2020-12-02 PROCEDURE — 99999 PR PBB SHADOW E&M-EST. PATIENT-LVL V: CPT | Mod: PBBFAC,,, | Performed by: INTERNAL MEDICINE

## 2020-12-02 PROCEDURE — 3008F BODY MASS INDEX DOCD: CPT | Mod: CPTII,S$GLB,, | Performed by: INTERNAL MEDICINE

## 2020-12-02 PROCEDURE — 99214 OFFICE O/P EST MOD 30 MIN: CPT | Mod: S$GLB,,, | Performed by: INTERNAL MEDICINE

## 2020-12-02 PROCEDURE — 93306 ECHO (CUPID ONLY): ICD-10-PCS | Mod: 26,,, | Performed by: INTERNAL MEDICINE

## 2020-12-02 PROCEDURE — 99999 PR PBB SHADOW E&M-EST. PATIENT-LVL V: ICD-10-PCS | Mod: PBBFAC,,, | Performed by: INTERNAL MEDICINE

## 2020-12-02 PROCEDURE — 3074F SYST BP LT 130 MM HG: CPT | Mod: CPTII,S$GLB,, | Performed by: INTERNAL MEDICINE

## 2020-12-02 PROCEDURE — 3078F PR MOST RECENT DIASTOLIC BLOOD PRESSURE < 80 MM HG: ICD-10-PCS | Mod: CPTII,S$GLB,, | Performed by: INTERNAL MEDICINE

## 2020-12-02 PROCEDURE — 99214 PR OFFICE/OUTPT VISIT, EST, LEVL IV, 30-39 MIN: ICD-10-PCS | Mod: S$GLB,,, | Performed by: INTERNAL MEDICINE

## 2020-12-02 PROCEDURE — 71046 XR CHEST PA AND LATERAL: ICD-10-PCS | Mod: 26,,, | Performed by: RADIOLOGY

## 2020-12-02 PROCEDURE — 99999 PR PBB SHADOW E&M-EST. PATIENT-LVL IV: ICD-10-PCS | Mod: PBBFAC,,, | Performed by: INTERNAL MEDICINE

## 2020-12-02 PROCEDURE — 99215 OFFICE O/P EST HI 40 MIN: CPT | Mod: S$GLB,,, | Performed by: INTERNAL MEDICINE

## 2020-12-02 PROCEDURE — U0003 INFECTIOUS AGENT DETECTION BY NUCLEIC ACID (DNA OR RNA); SEVERE ACUTE RESPIRATORY SYNDROME CORONAVIRUS 2 (SARS-COV-2) (CORONAVIRUS DISEASE [COVID-19]), AMPLIFIED PROBE TECHNIQUE, MAKING USE OF HIGH THROUGHPUT TECHNOLOGIES AS DESCRIBED BY CMS-2020-01-R: HCPCS

## 2020-12-02 PROCEDURE — 3075F SYST BP GE 130 - 139MM HG: CPT | Mod: CPTII,S$GLB,, | Performed by: INTERNAL MEDICINE

## 2020-12-02 PROCEDURE — 99215 PR OFFICE/OUTPT VISIT, EST, LEVL V, 40-54 MIN: ICD-10-PCS | Mod: S$GLB,,, | Performed by: INTERNAL MEDICINE

## 2020-12-02 PROCEDURE — 3078F DIAST BP <80 MM HG: CPT | Mod: CPTII,S$GLB,, | Performed by: INTERNAL MEDICINE

## 2020-12-02 PROCEDURE — 3074F PR MOST RECENT SYSTOLIC BLOOD PRESSURE < 130 MM HG: ICD-10-PCS | Mod: CPTII,S$GLB,, | Performed by: INTERNAL MEDICINE

## 2020-12-02 PROCEDURE — 3008F PR BODY MASS INDEX (BMI) DOCUMENTED: ICD-10-PCS | Mod: CPTII,S$GLB,, | Performed by: INTERNAL MEDICINE

## 2020-12-02 PROCEDURE — 1126F PR PAIN SEVERITY QUANTIFIED, NO PAIN PRESENT: ICD-10-PCS | Mod: S$GLB,,, | Performed by: INTERNAL MEDICINE

## 2020-12-02 PROCEDURE — 3075F PR MOST RECENT SYSTOLIC BLOOD PRESS GE 130-139MM HG: ICD-10-PCS | Mod: CPTII,S$GLB,, | Performed by: INTERNAL MEDICINE

## 2020-12-02 PROCEDURE — 3079F DIAST BP 80-89 MM HG: CPT | Mod: CPTII,S$GLB,, | Performed by: INTERNAL MEDICINE

## 2020-12-02 RX ORDER — SODIUM CHLORIDE 9 MG/ML
INJECTION, SOLUTION INTRAVENOUS CONTINUOUS
Status: CANCELLED | OUTPATIENT
Start: 2020-12-02 | End: 2020-12-02

## 2020-12-02 RX ORDER — DIPHENHYDRAMINE HCL 25 MG
50 CAPSULE ORAL ONCE
Status: CANCELLED | OUTPATIENT
Start: 2020-12-02 | End: 2020-12-02

## 2020-12-02 RX ORDER — POTASSIUM CHLORIDE 750 MG/1
500 TABLET, EXTENDED RELEASE ORAL DAILY
COMMUNITY
End: 2021-06-14

## 2020-12-02 RX ORDER — ASCORBIC ACID 500 MG
500 TABLET ORAL 3 TIMES DAILY
COMMUNITY

## 2020-12-02 NOTE — PROGRESS NOTES
"Subjective:   Ms. Navas is a 50 y.o. year old White female who received a donation after brain death heart transplant on 12/16/19.      CMV status:   Donor: +   Recipient: -    HPI  51 yo WF s/p OHT 12/16/2019 is here for annual visit.  No active CV c/o.  Working 30 hrs/week but plans to go to full time January 2020.     Immunosuppression:  Tacro 3 mg BID  Cellcept 1000 mg BID  Prednisone 5 mg qd (left on due to sarcoidosis)    Review of Systems   Constitution: Positive for weight gain (up few pounds past couple of months). Negative for chills, fever, malaise/fatigue, night sweats and weight loss.   HENT: Negative for congestion, hoarse voice, odynophagia and sore throat.         Cracked upper molar on amoxacillin with plan for extraction   Cardiovascular: Negative for chest pain, claudication, dyspnea on exertion, irregular heartbeat, leg swelling, near-syncope, orthopnea, palpitations and paroxysmal nocturnal dyspnea.   Respiratory: Negative for cough, hemoptysis, shortness of breath, sputum production and wheezing.    Hematologic/Lymphatic: Does not bruise/bleed easily.   Skin: Negative for suspicious lesions.        To see Derm soon and at least yearly thereafter   Musculoskeletal: Negative for arthritis, back pain, joint pain, joint swelling and myalgias.   Gastrointestinal: Negative for abdominal pain, anorexia, change in bowel habit, diarrhea, hematemesis, hematochezia and melena.   Genitourinary: Negative for dysuria, flank pain, frequency and hematuria.        Mammogram planned soon; routine surveillance   Neurological: Positive for focal weakness (hands) and sensory change. Negative for brief paralysis, dizziness, headaches, loss of balance, numbness, paresthesias, seizures and weakness.   Psychiatric/Behavioral: Negative for altered mental status and substance abuse.     Objective:   Blood pressure 131/82, pulse (!) 113, height 5' 8" (1.727 m), weight 94.8 kg (208 lb 15.9 oz).body mass index is 31.78 " "kg/m².  Physical Exam   Constitutional: She is oriented to person, place, and time. She appears well-developed and well-nourished. No distress.   /82 (BP Location: Right arm, Patient Position: Sitting, BP Method: Medium (Automatic))   Pulse (!) 113   Ht 5' 8" (1.727 m)   Wt 94.8 kg (208 lb 15.9 oz)   LMP  (LMP Unknown)   BMI 31.78 kg/m²      HENT:   Head: Normocephalic and atraumatic.   Nose: Nose normal.   Mouth/Throat: Oropharynx is clear and moist. No oropharyngeal exudate.   Eyes: Conjunctivae are normal. Right eye exhibits no discharge. Left eye exhibits no discharge. No scleral icterus.   Neck: No JVD present. Thyromegaly (known nodule needs u/s f/u) present.   Cardiovascular: Normal rate, regular rhythm and normal heart sounds. Exam reveals no gallop.   No murmur heard.  Pulmonary/Chest: Effort normal and breath sounds normal.   Abdominal: Soft. Bowel sounds are normal. She exhibits no distension and no mass. There is no abdominal tenderness. There is no rebound and no guarding.   Musculoskeletal:         General: No tenderness or edema.   Lymphadenopathy:     She has no cervical adenopathy (also no axillary or supraclavicular adenopathy).   Neurological: She is alert and oriented to person, place, and time.   Skin: Skin is warm and dry. She is not diaphoretic.   Psychiatric: She has a normal mood and affect. Her behavior is normal. Judgment and thought content normal.     Lab Results   Component Value Date    BNP 25 11/30/2020     12/02/2020     11/01/2019    K 3.5 12/02/2020    K 4.5 11/01/2019    MG 1.8 11/30/2020     12/02/2020    CL 24 10/10/2019    CO2 28 12/02/2020    PHOS 4.1 06/03/2020    BUN 26 (H) 12/02/2020    BUN 20 11/01/2019    CREATININE 1.4 12/02/2020    CREATININE 1.4 11/01/2019     12/02/2020    HGBA1C 4.2 11/30/2020    AST 14 11/30/2020    ALT 10 11/30/2020    ALBUMIN 4.4 11/30/2020    PROT 7.0 11/30/2020    BILITOT 0.8 11/30/2020    WBC 6.74 11/30/2020 "    HGB 10.5 (L) 11/30/2020    HCT 36.4 (L) 11/30/2020     (H) 11/30/2020    TSH 2.455 11/30/2020    C6CRMIL 8.6 11/30/2020    CHOL 126 11/30/2020    HDL 52 11/30/2020    LDLCALC 56.4 (L) 11/30/2020    TRIG 88 11/30/2020    TACROLIMUS 7.7 12/02/2020     CXR 12/2/20 read as concern for RLL nodule.  Will repeat CXR in 3 months as opposed to obtaining CT based upon Radiologist recommendation    12/2/20 ECHO Conclusion       · Patient is s/p cardiac transplant.  · Tachycardia was present during the study  · The left ventricle is normal in size with normal systolic function. The estimated ejection fraction is 65%.  · Normal left ventricular diastolic function.  · Normal right ventricular size with normal right ventricular systolic function.  · The estimated PA systolic pressure is 27 mmHg.  · Normal central venous pressure (3 mmHg).     Assessment:     1. Heart transplanted    2. Abnormality of lung on CXR    3. Multinodular goiter    4. Class 1 obesity due to excess calories with serious comorbidity and body mass index (BMI) of 31.0 to 31.9 in adult    5. Immunosuppression    6. Stage 3a chronic kidney disease    7. Hyperlipidemia LDL goal <100    8. Restrictive cardiomyopathy post heart transplant    9. 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        Plan:   Obtain CXR in 3 months for possible RLL nodule  Stop dapsone  Off januvia repeat A1c 3 months later  Proceed with coronary angiogram tomorrow.  This is required by standard of care of post-heart transplant patients and coronary intravascular ultrasound will be performed as well to assess for any evidence of CAV  She need repeat thyroid u/s as overdue should obtain ASAP  Work on weight loss and exercise program    Return instructions as set forth by post transplant schedule or as needed:    Clinic: Return for labs and/or biopsy weekly the first month, every two weeks during month 2 and then monthly for the first year at the  provider or coordinator's discretion. During the second year, return to clinic every 3 months. Post transplant year 3-5 return every 6 months. There will be a comprehensive post transplant evaluation every year that may include LHC/RHC/biopsy, stress test, echo, CXR, and other health screening exams.    In addition to the clinical assessment, I have ordered Allomap testing for this patient to assist in identification of moderate/severe acute cellular rejection (ACR) in a pt with stable Allograft function instead of endomyocardial biopsy.     Patient is reminded to call with any health changes as these can be early signs of transplant complications. Patient is advised to make sure any new medications or changes of old medications are discussed with a pharmacist or physician knowledgeable with transplant to avoid rejection/drug toxicity related to significant drug interactions.    UNOS Patient Status  Functional Status: 100% - Normal, no complaints, no evidence of disease  Physical Capacity: No Limitations  Working for Income: yes  If yes, working activity level: Working Part Time Due to Disability    Eric Antunez Jr, MD

## 2020-12-02 NOTE — LETTER
December 3, 2020      Miriam Prieto MD  [address]  [facility] [address]           [facility] [address]  [address] [facility] [address]  Phone: [phone]          Patient: Deborah Navas   MR Number: [id number]   YOB: 1970   Date of Visit: 12/2/2020       Dear Dr. Miriam Prieto:    Thank you for referring Deborah Navas to me for evaluation. Attached you will find relevant portions of my assessment and plan of care.    If you have questions, please do not hesitate to call me. I look forward to following Deborah Navas along with you.    Sincerely,    Daron Bills MD    Enclosure  CC:  No Recipients    If you would like to receive this communication electronically, please contact [email or web address] or [phone] to request more information on [employer] Link access.    For providers and/or their staff who would like to refer a patient to [facility], please contact us through our one-stop-shop provider referral line, [facility], at [phone].    If you feel you have received this communication in error or would no longer like to receive these types of communications, please e-mail [email or web address]

## 2020-12-02 NOTE — TELEPHONE ENCOUNTER
OUTPATIENT CATHETERIZATION INSTRUCTIONS    You have been scheduled for a procedure in the catheterization lab on Thursday, December 3, 2020.     Please report to the Cardiology Waiting Area on the Third floor of the hospital and check in at 7 AM.   You will then be taken to the SSCU (Short Stay Cardiac Unit) and prepared for your procedure. Please be aware that this is not the time of your procedure but the time you are to arrive. The procedures are scheduled on an hourly basis; however, emergency cases take precedence over all other cases.       You may not have anything to eat or drink after midnight the night before your test. You may take your regular morning medications with water. If there are any medications that you should not take you will be instructed to hold them that morning. If you are diabetic and on Metformin (Glucophage) do not take it the day before, the day of, and for 2 days after your procedure.      The procedure will take 1-2 hours to perform. After the procedure, you will return to SSCU on the third floor of the hospital. You will need to lie still (or keep your arm still) for the next 4 to 6 hours to minimize bleeding from the puncture site. Your family may remain in the room with you during this time.       You may be able to be discharged home that same afternoon if there is someone to drive you home and there were no complications. If you have one of the balloon, stent, or device procedures you may spend the night in the hospital. Your doctor will determine, based on your progress, the date and time of your discharge. The results of your procedure will be discussed with you before you are discharged. Any further testing or procedures will be scheduled for you either before you leave or you will be called with these appointments.       If you should have any questions, concerns, or need to change the date of your procedure, please call  HELEN Johnson @ [phone]    Special  Instructions:  Continue on meds        THE ABOVE INSTRUCTIONS WERE GIVEN TO THE PATIENT VERBALLY AND THEY VERBALIZED UNDERSTANDING.  THEY DO NOT REQUIRE ANY SPECIAL NEEDS AND DO NOT HAVE ANY LEARNING BARRIERS.          Directions for Reporting to Cardiology Waiting Area in the Hospital  If you park in the Parking Garage:  Take elevators to the1st floor of the parking garage.  Continue past the gift shop, coffee shop, and piano.  Take a right and go to the gold elevators. (Elevator B)  Take the elevator to the 3rd floor.  Follow the arrow on the sign on the wall that says Cath Lab Registration/EP Lab Registration.  Follow the long hallway all the way around until you come to a big open area.  This is the registration area.  Check in at Reception Desk.    OR    If family is dropping you off:  Have them drop you off at the front of the Hospital under the green overhang.  Enter through the doors and take a right.  Take the E elevators to the 3rd floor Cardiology Waiting Area.  Check in at the Reception Desk in the waiting room.

## 2020-12-02 NOTE — PROGRESS NOTES
"          Interventional Cardiology Office Visit     PATIENT: Deborah Navas  MRN: [id number]   PCP: Valeria Verma MD       Dear Dr. Valeria Verma MD    Thank you for asking me to see your patient Ms. Deborah Navas today for post transplant evaluation.    Chief Complaint   Patient presents with    Heart Transplant Follow-up         History of Present Illness:   Ms. Deborah Navas is a 50 y.o. female who I am evaluating for post transplant evaluation.       Deborah Navas is a 50 y.o. female with a PMH significant for sarcoidosis stable, end stage NICM EF 15% s/p LVAD BTT s/p OHTX 12/19, Ventricular tachycardia (non since OHTX), mixed hyperlipidemia and obesity, improving. She is here for post cardiac transplant left and right heart cath routine surveillance. She denies chest pain or dyspnea.        Past Medical History:   Diagnosis Date    Fractures     History of ventricular fibrillation 9/4/2019    History of ventricular tachycardia 9/4/2019    Hyperlipidemia     Migraine     Multinodular goiter 9/5/2019    Osteoarthritis     Restrictive cardiomyopathy post heart transplant        Review of patient's allergies indicates:   Allergen Reactions    Adhesive Blisters     "Reaction to chest only up to neck. Denies any problems w/adhesive on any other areas of the body.    Codeine Itching       Outpatient Medications Marked as Taking for the 12/2/20 encounter (Initial consult) with Daron Bills MD   Medication Sig Dispense Refill    amLODIPine (NORVASC) 5 MG tablet Take 1 tablet (5 mg total) by mouth once daily. 30 tablet 11    amoxicillin (AMOXIL) 500 MG capsule Take 1 capsule (500 mg) by mouth twice daily. 14 capsule 0    ascorbic acid, vitamin C, (VITAMIN C) 500 MG tablet Take 500 mg by mouth 3 (three) times daily.      aspirin (ECOTRIN) 81 MG EC tablet Take 1 tablet (81 mg total) by mouth once daily. 30 tablet 11    atorvastatin (LIPITOR) 20 MG tablet Take " 1 tablet (20 mg total) by mouth once daily. (Patient taking differently: Take 20 mg by mouth every evening. ) 90 tablet 3    BIOTIN ORAL Take 500 mg by mouth.      calcium carbonate-vitamin D3 1,000 mg(2,500 mg)-800 unit Tab Take 1 tablet by mouth once daily. 30 tablet 11    ferrous sulfate 325 (65 FE) MG EC tablet Take 1 tablet (325 mg total) by mouth 3 (three) times daily with meals. And take 4 oz of OJ or at least 250 mg of Vit C with each dose 90 tablet 3    gabapentin (NEURONTIN) 300 MG capsule Take 1 capsule (300 mg total) by mouth every evening. 30 capsule 11    HYDROcodone-acetaminophen (NORCO) 5-325 mg per tablet Take 1 tablet by mouth every 6 (six) hours as needed. 28 tablet 0    magnesium oxide (MAG-OX) 400 mg (241.3 mg magnesium) tablet Take 1 tablet (400 mg total) by mouth once daily. 30 tablet 11    multivitamin capsule Take 1 capsule by mouth once daily.      mycophenolate (CELLCEPT) 250 mg Cap Take 4 capsules (1,000 mg total) by mouth 2 (two) times daily. 240 capsule 11    pantoprazole (PROTONIX) 40 MG tablet Take 1 tablet (40 mg total) by mouth once daily. 30 tablet 11    potassium chloride SA (K-DUR,KLOR-CON) 10 MEQ tablet Take 500 mEq by mouth once daily.      predniSONE (DELTASONE) 5 MG tablet Take 1 tablet (5 mg total) by mouth once daily. 30 tablet 12    senna-docusate 8.6-50 mg (PERICOLACE) 8.6-50 mg per tablet Take 2 tablets by mouth 2 (two) times daily as needed for Constipation.      tacrolimus (PROGRAF) 1 MG Cap Take 3 capsules (3 mg total) by mouth every 12 (twelve) hours. 180 capsule 11    venlafaxine (EFFEXOR-XR) 150 MG Cp24 Take 1 capsule (150 mg total) by mouth once daily. 30 capsule 11    zolpidem (AMBIEN CR) 12.5 MG CR tablet TAKE 1 TABLET BY MOUTH EVERY DAY AT NIGHT AS NEEDED      [DISCONTINUED] dapsone 100 MG Tab Take 1 tablet (100 mg total) by mouth once daily. 30 tablet 11       Family History   Problem Relation Age of Onset    Osteoarthritis Mother      Migraines Mother     Osteoarthritis Maternal Grandmother     Migraines Maternal Grandmother     Broken bones Maternal Grandmother     Osteoporosis Maternal Grandmother     Dislocations Maternal Grandmother     Scoliosis Maternal Grandmother     Osteoarthritis Paternal Grandmother     Cancer Paternal Grandmother         leukemia    Migraines Paternal Grandmother     Obesity Paternal Grandmother     Osteoarthritis Paternal Grandfather     Heart failure Paternal Grandfather     Migraines Paternal Grandfather     Heart failure Maternal Grandfather     Migraines Maternal Grandfather     Osteoarthritis Maternal Grandfather     Stroke Father     Osteoarthritis Father        Social History:  Social History     Tobacco Use    Smoking status: Never Smoker    Smokeless tobacco: Never Used   Substance Use Topics    Alcohol use: No         Review of Systems:  Pertinent positive and negative as mentioned below, otherwise a full review of systems was negative.    General/Constitutional: No Weight loss or gain, No fevers, No chills   Skin: No Rash, No itching   HEENT: No Headaches, No vertigo, No lightheadedness, No double vision,   Cardiovascular: No Chest pain, No palpitations,  No syncope,   Respiratory: No Shortness of breath, No wheezing, No stridor  Gastrointestinal: No poor Appetite, No dysphagia, No indigestion, No vomiting, No constipation, No diarrhea  Genitourinary: No Urgency, No frequency, No dysuria, No nocturia, No hematuria   Musculoskeletal: No Pain, No swelling, No redness or heat of muscles or joints, No limitation, of motion, No muscular weakness, No atrophy, No cramps   Neurologic: No Convulsions, No paralyses, No tremor,No incoordination, No parathesias,    Psychiatry: No Depression, No Anxiety       Vitals:  BP: 126/76           Pulse: 106            Physical Exam:  Alert, and oriented  Skin: No cutaneous findings consistent with hyperlipidemia. Skin dry without rash, jaundice.  Mouth:  Oral mucosa moist without lesions, telangiectasias.  Neck: Supple without cervical lymphadenopathy.  No JVD, thyroid gland enlargement.  Normal carotid upstrokes bilaterally without bruits.  Cardiac: RRR without murmur, S3, S4, rub  Lungs: CTA bilaterally without rales, rhonchi, wheeze, rub  Abdomen: Soft, flat, non-tender, normal active bowel sounds throughout.  No hepatosplenomegaly, mass.  The abdominal aorta is not palpable and not aneurysmal.  No abdominal, pelvic, flank bruits bilaterally.  Neuro: Alert, and oriented, no focal neurodeficit   Extremities: No ischemia, cyanosis, ulceration or intertrigo.  No edema, venous varicosities, or stasis pigmentation changes bilaterally.    Pulses: Superficial temporal 2+, carotid 2+, axillary 2+, brachial 2+, radial 2+, femoral 2+ without bruits, popliteal 2+, dorsalis pedis 2+ and posterior tibial pulses 2+ and symmetric.  No pallor with elevation.  No rubor with dependency.       Data/results:  Basic Metabolic Panel   Lab Results   Component Value Date     12/02/2020    K 3.5 12/02/2020    CO2 28 12/02/2020    BUN 26 (H) 12/02/2020     Liver Function Tests  [unfilled]  No components found for: GHBA1C  TSH   Date Value Ref Range Status   11/30/2020 2.455 0.400 - 4.000 uIU/mL Final     Lab Results   Component Value Date    WBC 6.74 11/30/2020    RBC 3.85 (L) 11/30/2020    HGB 10.5 (L) 11/30/2020    HCT 36.4 (L) 11/30/2020     (H) 11/30/2020     Lab Results   Component Value Date    INR 1.2 12/23/2019       Images     EKG: I have personally reviewed the EKG from [unfilled], which shows Sinus tachycardia      Assessment and Plan:     is a 50 y.o. female who presents for heart transplant surveillance.    Heart transplanted  - Will proceed with C with IVUS and RHC with biopsy for transplant surveillance  - Access R Radial, R IJ   - No contrast allergy  -The risks, benefits & alternatives of the procedure were explained to the patient.    -The risks of  coronary angiography include but are not limited to:  Bleeding, infection, heart rhythm abnormalities, allergic reactions, kidney injury, stroke and death.    -Should stenting be indicated, the patient has agreed to dual anti-platelet therapy for 1-consecutive year with a drug-eluting stent and a minimum of 1-month with the use of a bare metal stent.    -The risks of moderate sedation include hypotension, respiratory depression, arrhythmias, bronchospasm, & death.    -Informed consent was obtained & the patient is agreeable to proceed with the procedure.        CKD (chronic kidney disease)  Stable, kidney function within normal range    Restrictive cardiomyopathy post heart transplant  - History of sarcoidosis recently diagnosed post transplant    Cubital tunnel syndrome, bilateral  - s/p R ulnar carpal surgery  - Plan for left side 12/18  - Stable    Mixed hyperlipidemia  - Well controlled, continue statin        Education Counseling:  Goals    None           Follow up:  No follow-ups on file.        Jonathan Santiago MD (Sourav)  Cardiology Fellow  PGY 6  [facility]

## 2020-12-02 NOTE — Clinical Note
Nia I did not see u/s thyroid not repeated until reviewing all so please make sure she knows and get scheduled soon

## 2020-12-02 NOTE — ASSESSMENT & PLAN NOTE
- Will proceed with [facility] with IVUS and C with biopsy for transplant surveillance  - Access R Radial, R IJ   - No contrast allergy  -The risks, benefits & alternatives of the procedure were explained to the patient.    -The risks of coronary angiography include but are not limited to:  Bleeding, infection, heart rhythm abnormalities, allergic reactions, kidney injury, stroke and death.    -Should stenting be indicated, the patient has agreed to dual anti-platelet therapy for 1-consecutive year with a drug-eluting stent and a minimum of 1-month with the use of a bare metal stent.    -The risks of moderate sedation include hypotension, respiratory depression, arrhythmias, bronchospasm, & death.    -Informed consent was obtained & the patient is agreeable to proceed with the procedure.

## 2020-12-02 NOTE — H&P (VIEW-ONLY)
"          Interventional Cardiology Office Visit     PATIENT: Deborah Navas  MRN: [id number]   PCP: Valeria Verma MD       Dear Dr. Valeria Verma MD    Thank you for asking me to see your patient Ms. Deborah Navas today for post transplant evaluation.    Chief Complaint   Patient presents with    Heart Transplant Follow-up         History of Present Illness:   Ms. Deborah Navas is a 50 y.o. female who I am evaluating for post transplant evaluation.       Deborah Navas is a 50 y.o. female with a PMH significant for sarcoidosis stable, end stage NICM EF 15% s/p LVAD BTT s/p OHTX 12/19, Ventricular tachycardia (non since OHTX), mixed hyperlipidemia and obesity, improving. She is here for post cardiac transplant left and right heart cath routine surveillance. She denies chest pain or dyspnea.        Past Medical History:   Diagnosis Date    Fractures     History of ventricular fibrillation 9/4/2019    History of ventricular tachycardia 9/4/2019    Hyperlipidemia     Migraine     Multinodular goiter 9/5/2019    Osteoarthritis     Restrictive cardiomyopathy post heart transplant        Review of patient's allergies indicates:   Allergen Reactions    Adhesive Blisters     "Reaction to chest only up to neck. Denies any problems w/adhesive on any other areas of the body.    Codeine Itching       Outpatient Medications Marked as Taking for the 12/2/20 encounter (Initial consult) with Daron Bills MD   Medication Sig Dispense Refill    amLODIPine (NORVASC) 5 MG tablet Take 1 tablet (5 mg total) by mouth once daily. 30 tablet 11    amoxicillin (AMOXIL) 500 MG capsule Take 1 capsule (500 mg) by mouth twice daily. 14 capsule 0    ascorbic acid, vitamin C, (VITAMIN C) 500 MG tablet Take 500 mg by mouth 3 (three) times daily.      aspirin (ECOTRIN) 81 MG EC tablet Take 1 tablet (81 mg total) by mouth once daily. 30 tablet 11    atorvastatin (LIPITOR) 20 MG tablet Take " 1 tablet (20 mg total) by mouth once daily. (Patient taking differently: Take 20 mg by mouth every evening. ) 90 tablet 3    BIOTIN ORAL Take 500 mg by mouth.      calcium carbonate-vitamin D3 1,000 mg(2,500 mg)-800 unit Tab Take 1 tablet by mouth once daily. 30 tablet 11    ferrous sulfate 325 (65 FE) MG EC tablet Take 1 tablet (325 mg total) by mouth 3 (three) times daily with meals. And take 4 oz of OJ or at least 250 mg of Vit C with each dose 90 tablet 3    gabapentin (NEURONTIN) 300 MG capsule Take 1 capsule (300 mg total) by mouth every evening. 30 capsule 11    HYDROcodone-acetaminophen (NORCO) 5-325 mg per tablet Take 1 tablet by mouth every 6 (six) hours as needed. 28 tablet 0    magnesium oxide (MAG-OX) 400 mg (241.3 mg magnesium) tablet Take 1 tablet (400 mg total) by mouth once daily. 30 tablet 11    multivitamin capsule Take 1 capsule by mouth once daily.      mycophenolate (CELLCEPT) 250 mg Cap Take 4 capsules (1,000 mg total) by mouth 2 (two) times daily. 240 capsule 11    pantoprazole (PROTONIX) 40 MG tablet Take 1 tablet (40 mg total) by mouth once daily. 30 tablet 11    potassium chloride SA (K-DUR,KLOR-CON) 10 MEQ tablet Take 500 mEq by mouth once daily.      predniSONE (DELTASONE) 5 MG tablet Take 1 tablet (5 mg total) by mouth once daily. 30 tablet 12    senna-docusate 8.6-50 mg (PERICOLACE) 8.6-50 mg per tablet Take 2 tablets by mouth 2 (two) times daily as needed for Constipation.      tacrolimus (PROGRAF) 1 MG Cap Take 3 capsules (3 mg total) by mouth every 12 (twelve) hours. 180 capsule 11    venlafaxine (EFFEXOR-XR) 150 MG Cp24 Take 1 capsule (150 mg total) by mouth once daily. 30 capsule 11    zolpidem (AMBIEN CR) 12.5 MG CR tablet TAKE 1 TABLET BY MOUTH EVERY DAY AT NIGHT AS NEEDED      [DISCONTINUED] dapsone 100 MG Tab Take 1 tablet (100 mg total) by mouth once daily. 30 tablet 11       Family History   Problem Relation Age of Onset    Osteoarthritis Mother      Migraines Mother     Osteoarthritis Maternal Grandmother     Migraines Maternal Grandmother     Broken bones Maternal Grandmother     Osteoporosis Maternal Grandmother     Dislocations Maternal Grandmother     Scoliosis Maternal Grandmother     Osteoarthritis Paternal Grandmother     Cancer Paternal Grandmother         leukemia    Migraines Paternal Grandmother     Obesity Paternal Grandmother     Osteoarthritis Paternal Grandfather     Heart failure Paternal Grandfather     Migraines Paternal Grandfather     Heart failure Maternal Grandfather     Migraines Maternal Grandfather     Osteoarthritis Maternal Grandfather     Stroke Father     Osteoarthritis Father        Social History:  Social History     Tobacco Use    Smoking status: Never Smoker    Smokeless tobacco: Never Used   Substance Use Topics    Alcohol use: No         Review of Systems:  Pertinent positive and negative as mentioned below, otherwise a full review of systems was negative.    General/Constitutional: No Weight loss or gain, No fevers, No chills   Skin: No Rash, No itching   HEENT: No Headaches, No vertigo, No lightheadedness, No double vision,   Cardiovascular: No Chest pain, No palpitations,  No syncope,   Respiratory: No Shortness of breath, No wheezing, No stridor  Gastrointestinal: No poor Appetite, No dysphagia, No indigestion, No vomiting, No constipation, No diarrhea  Genitourinary: No Urgency, No frequency, No dysuria, No nocturia, No hematuria   Musculoskeletal: No Pain, No swelling, No redness or heat of muscles or joints, No limitation, of motion, No muscular weakness, No atrophy, No cramps   Neurologic: No Convulsions, No paralyses, No tremor,No incoordination, No parathesias,    Psychiatry: No Depression, No Anxiety       Vitals:  BP: 126/76           Pulse: 106            Physical Exam:  Alert, and oriented  Skin: No cutaneous findings consistent with hyperlipidemia. Skin dry without rash, jaundice.  Mouth:  Oral mucosa moist without lesions, telangiectasias.  Neck: Supple without cervical lymphadenopathy.  No JVD, thyroid gland enlargement.  Normal carotid upstrokes bilaterally without bruits.  Cardiac: RRR without murmur, S3, S4, rub  Lungs: CTA bilaterally without rales, rhonchi, wheeze, rub  Abdomen: Soft, flat, non-tender, normal active bowel sounds throughout.  No hepatosplenomegaly, mass.  The abdominal aorta is not palpable and not aneurysmal.  No abdominal, pelvic, flank bruits bilaterally.  Neuro: Alert, and oriented, no focal neurodeficit   Extremities: No ischemia, cyanosis, ulceration or intertrigo.  No edema, venous varicosities, or stasis pigmentation changes bilaterally.    Pulses: Superficial temporal 2+, carotid 2+, axillary 2+, brachial 2+, radial 2+, femoral 2+ without bruits, popliteal 2+, dorsalis pedis 2+ and posterior tibial pulses 2+ and symmetric.  No pallor with elevation.  No rubor with dependency.       Data/results:  Basic Metabolic Panel   Lab Results   Component Value Date     12/02/2020    K 3.5 12/02/2020    CO2 28 12/02/2020    BUN 26 (H) 12/02/2020     Liver Function Tests  [unfilled]  No components found for: GHBA1C  TSH   Date Value Ref Range Status   11/30/2020 2.455 0.400 - 4.000 uIU/mL Final     Lab Results   Component Value Date    WBC 6.74 11/30/2020    RBC 3.85 (L) 11/30/2020    HGB 10.5 (L) 11/30/2020    HCT 36.4 (L) 11/30/2020     (H) 11/30/2020     Lab Results   Component Value Date    INR 1.2 12/23/2019       Images     EKG: I have personally reviewed the EKG from [unfilled], which shows Sinus tachycardia      Assessment and Plan:     is a 50 y.o. female who presents for heart transplant surveillance.    Heart transplanted  - Will proceed with C with IVUS and RHC with biopsy for transplant surveillance  - Access R Radial, R IJ   - No contrast allergy  -The risks, benefits & alternatives of the procedure were explained to the patient.    -The risks of  coronary angiography include but are not limited to:  Bleeding, infection, heart rhythm abnormalities, allergic reactions, kidney injury, stroke and death.    -Should stenting be indicated, the patient has agreed to dual anti-platelet therapy for 1-consecutive year with a drug-eluting stent and a minimum of 1-month with the use of a bare metal stent.    -The risks of moderate sedation include hypotension, respiratory depression, arrhythmias, bronchospasm, & death.    -Informed consent was obtained & the patient is agreeable to proceed with the procedure.        CKD (chronic kidney disease)  Stable, kidney function within normal range    Restrictive cardiomyopathy post heart transplant  - History of sarcoidosis recently diagnosed post transplant    Cubital tunnel syndrome, bilateral  - s/p R ulnar carpal surgery  - Plan for left side 12/18  - Stable    Mixed hyperlipidemia  - Well controlled, continue statin        Education Counseling:  Goals    None           Follow up:  No follow-ups on file.        Jonathan Santiago MD (Sourav)  Cardiology Fellow  PGY 6  [facility]

## 2020-12-02 NOTE — LETTER
December 2, 2020        Joaquin Del Toro  [address]  [address]  [place] [address]  Phone: [phone]  Fax: [phone]             [id number]  [address]  [facility] [address]  Phone: [phone]   Patient: Deborah Navas   MR Number: [id number]   YOB: 1970   Date of Visit: 12/2/2020       Dear Dr. Joaquin Del Toro    Thank you for referring Deborah Navas to me for evaluation. Attached you will find relevant portions of my assessment and plan of care.    If you have questions, please do not hesitate to call me. I look forward to following Deborah Navas along with you.    Sincerely,    Eric Antunez Jr, MD    Enclosure    If you would like to receive this communication electronically, please contact [email or web address] or [phone] to request [employer] Link access.    [employer] Link is a tool which provides read-only access to select patient information with whom you have a relationship. Its easy to use and provides real time access to review your patients record including encounter summaries, notes, results, and demographic information.    If you feel you have received this communication in error or would no longer like to receive these types of communications, please e-mail [email or web address]

## 2020-12-03 ENCOUNTER — PATIENT MESSAGE (OUTPATIENT)
Dept: TRANSPLANT | Facility: CLINIC | Age: 50
End: 2020-12-03

## 2020-12-03 ENCOUNTER — HOSPITAL ENCOUNTER (OUTPATIENT)
Facility: HOSPITAL | Age: 50
Discharge: HOME | End: 2020-12-03
Attending: INTERNAL MEDICINE | Admitting: INTERNAL MEDICINE
Payer: COMMERCIAL

## 2020-12-03 VITALS
RESPIRATION RATE: 20 BRPM | SYSTOLIC BLOOD PRESSURE: 134 MMHG | OXYGEN SATURATION: 97 % | WEIGHT: 208 LBS | HEIGHT: 68 IN | DIASTOLIC BLOOD PRESSURE: 73 MMHG | BODY MASS INDEX: 31.52 KG/M2 | HEART RATE: 99 BPM | TEMPERATURE: 99 F

## 2020-12-03 DIAGNOSIS — Z94.1 HEART TRANSPLANTED: ICD-10-CM

## 2020-12-03 LAB
ABO + RH BLD: NORMAL
ANION GAP SERPL CALC-SCNC: 10 MMOL/L (ref 8–16)
BASOPHILS # BLD AUTO: 0.06 K/UL (ref 0–0.2)
BASOPHILS NFR BLD: 0.9 % (ref 0–1.9)
BLD GP AB SCN CELLS X3 SERPL QL: NORMAL
BUN SERPL-MCNC: 28 MG/DL (ref 6–20)
C1Q1 TESTING DATE: NORMAL
C1Q2 TESTING DATE: NORMAL
CALCIUM SERPL-MCNC: 9.5 MG/DL (ref 8.7–10.5)
CHLORIDE SERPL-SCNC: 102 MMOL/L (ref 95–110)
CLASS I ANTIBODY COMMENTS - LUMINEX: NORMAL
CLASS II ANTIBODY COMMENTS - LUMINEX: NORMAL
CO2 SERPL-SCNC: 29 MMOL/L (ref 23–29)
CREAT SERPL-MCNC: 1.5 MG/DL (ref 0.5–1.4)
DIFFERENTIAL METHOD: ABNORMAL
EOSINOPHIL # BLD AUTO: 0.1 K/UL (ref 0–0.5)
EOSINOPHIL NFR BLD: 1.3 % (ref 0–8)
ERYTHROCYTE [DISTWIDTH] IN BLOOD BY AUTOMATED COUNT: 15.7 % (ref 11.5–14.5)
EST. GFR  (AFRICAN AMERICAN): 46.2 ML/MIN/1.73 M^2
EST. GFR  (NON AFRICAN AMERICAN): 40.1 ML/MIN/1.73 M^2
GLUCOSE SERPL-MCNC: 95 MG/DL (ref 70–110)
HC1Q TESTING DATE: NORMAL
HCT VFR BLD AUTO: 31.8 % (ref 37–48.5)
HGB BLD-MCNC: 9.4 G/DL (ref 12–16)
IMM GRANULOCYTES # BLD AUTO: 0.03 K/UL (ref 0–0.04)
IMM GRANULOCYTES NFR BLD AUTO: 0.4 % (ref 0–0.5)
LYMPHOCYTES # BLD AUTO: 0.8 K/UL (ref 1–4.8)
LYMPHOCYTES NFR BLD: 11.6 % (ref 18–48)
MCH RBC QN AUTO: 27.5 PG (ref 27–31)
MCHC RBC AUTO-ENTMCNC: 29.6 G/DL (ref 32–36)
MCV RBC AUTO: 93 FL (ref 82–98)
MONOCYTES # BLD AUTO: 0.7 K/UL (ref 0.3–1)
MONOCYTES NFR BLD: 10.6 % (ref 4–15)
NEUTROPHILS # BLD AUTO: 5.2 K/UL (ref 1.8–7.7)
NEUTROPHILS NFR BLD: 75.2 % (ref 38–73)
NRBC BLD-RTO: 0 /100 WBC
PLATELET # BLD AUTO: 338 K/UL (ref 150–350)
PMV BLD AUTO: 9.6 FL (ref 9.2–12.9)
POTASSIUM SERPL-SCNC: 3.6 MMOL/L (ref 3.5–5.1)
RBC # BLD AUTO: 3.42 M/UL (ref 4–5.4)
SARS-COV-2 RDRP RESP QL NAA+PROBE: NEGATIVE
SARS-COV-2 RNA RESP QL NAA+PROBE: NOT DETECTED
SERUM COLLECTION DT - LUMINEX CLASS I: NORMAL
SERUM COLLECTION DT - LUMINEX CLASS II: NORMAL
SODIUM SERPL-SCNC: 141 MMOL/L (ref 136–145)
WBC # BLD AUTO: 6.91 K/UL (ref 3.9–12.7)

## 2020-12-03 PROCEDURE — 93460 PR CATH PLACE/CORON ANGIO, IMG SUPER/INTERP,R&L HRT CATH, L HRT VENTRIC: ICD-10-PCS | Mod: 26,,, | Performed by: INTERNAL MEDICINE

## 2020-12-03 PROCEDURE — 92978 ENDOLUMINL IVUS OCT C 1ST: CPT | Mod: 26,LD,, | Performed by: INTERNAL MEDICINE

## 2020-12-03 PROCEDURE — 63600175 PHARM REV CODE 636 W HCPCS: Performed by: INTERNAL MEDICINE

## 2020-12-03 PROCEDURE — 88307 TISSUE EXAM BY PATHOLOGIST: CPT | Mod: 26,,, | Performed by: PATHOLOGY

## 2020-12-03 PROCEDURE — 88307 PR  SURG PATH,LEVEL V: ICD-10-PCS | Mod: 26,,, | Performed by: PATHOLOGY

## 2020-12-03 PROCEDURE — 88307 TISSUE EXAM BY PATHOLOGIST: CPT | Performed by: PATHOLOGY

## 2020-12-03 PROCEDURE — 25000003 PHARM REV CODE 250: Performed by: STUDENT IN AN ORGANIZED HEALTH CARE EDUCATION/TRAINING PROGRAM

## 2020-12-03 PROCEDURE — C1751 CATH, INF, PER/CENT/MIDLINE: HCPCS | Performed by: INTERNAL MEDICINE

## 2020-12-03 PROCEDURE — 88346 IMFLUOR 1ST 1ANTB STAIN PX: CPT | Mod: 26,,, | Performed by: PATHOLOGY

## 2020-12-03 PROCEDURE — 92978 PR IVUS, CORONARY, 1ST VESSEL: ICD-10-PCS | Mod: 26,LD,, | Performed by: INTERNAL MEDICINE

## 2020-12-03 PROCEDURE — 88346 PR IMMUNOFLUORESCENT ANTB, 1ST STAIN: ICD-10-PCS | Mod: 26,,, | Performed by: PATHOLOGY

## 2020-12-03 PROCEDURE — 88342 IMHCHEM/IMCYTCHM 1ST ANTB: CPT | Mod: 26,,, | Performed by: PATHOLOGY

## 2020-12-03 PROCEDURE — 93505 PR BIOPSY OF HEART LINING: ICD-10-PCS | Mod: 26,,, | Performed by: INTERNAL MEDICINE

## 2020-12-03 PROCEDURE — 25000003 PHARM REV CODE 250: Performed by: INTERNAL MEDICINE

## 2020-12-03 PROCEDURE — 25500020 PHARM REV CODE 255: Performed by: INTERNAL MEDICINE

## 2020-12-03 PROCEDURE — 99153 MOD SED SAME PHYS/QHP EA: CPT | Performed by: INTERNAL MEDICINE

## 2020-12-03 PROCEDURE — 88302 TISSUE EXAM BY PATHOLOGIST: CPT | Mod: 26,,, | Performed by: PATHOLOGY

## 2020-12-03 PROCEDURE — 88342 IMHCHEM/IMCYTCHM 1ST ANTB: CPT | Performed by: PATHOLOGY

## 2020-12-03 PROCEDURE — 93460 R&L HRT ART/VENTRICLE ANGIO: CPT | Mod: 26,,, | Performed by: INTERNAL MEDICINE

## 2020-12-03 PROCEDURE — 86850 RBC ANTIBODY SCREEN: CPT

## 2020-12-03 PROCEDURE — 93460 R&L HRT ART/VENTRICLE ANGIO: CPT | Performed by: INTERNAL MEDICINE

## 2020-12-03 PROCEDURE — 27201423 OPTIME MED/SURG SUP & DEVICES STERILE SUPPLY: Performed by: INTERNAL MEDICINE

## 2020-12-03 PROCEDURE — C1887 CATHETER, GUIDING: HCPCS | Performed by: INTERNAL MEDICINE

## 2020-12-03 PROCEDURE — 80048 BASIC METABOLIC PNL TOTAL CA: CPT

## 2020-12-03 PROCEDURE — C1894 INTRO/SHEATH, NON-LASER: HCPCS | Performed by: INTERNAL MEDICINE

## 2020-12-03 PROCEDURE — 99152 MOD SED SAME PHYS/QHP 5/>YRS: CPT | Mod: ,,, | Performed by: INTERNAL MEDICINE

## 2020-12-03 PROCEDURE — 88302 TISSUE EXAM BY PATHOLOGIST: CPT | Performed by: PATHOLOGY

## 2020-12-03 PROCEDURE — 93505 ENDOMYOCARDIAL BIOPSY: CPT | Performed by: INTERNAL MEDICINE

## 2020-12-03 PROCEDURE — C1753 CATH, INTRAVAS ULTRASOUND: HCPCS | Performed by: INTERNAL MEDICINE

## 2020-12-03 PROCEDURE — 88342 CHG IMMUNOCYTOCHEMISTRY: ICD-10-PCS | Mod: 26,,, | Performed by: PATHOLOGY

## 2020-12-03 PROCEDURE — 99152 PR MOD CONSCIOUS SEDATION, SAME PHYS, 5+ YRS, FIRST 15 MIN: ICD-10-PCS | Mod: ,,, | Performed by: INTERNAL MEDICINE

## 2020-12-03 PROCEDURE — 88302 PR  SURG PATH,LEVEL II: ICD-10-PCS | Mod: 26,,, | Performed by: PATHOLOGY

## 2020-12-03 PROCEDURE — U0002 COVID-19 LAB TEST NON-CDC: HCPCS

## 2020-12-03 PROCEDURE — 99152 MOD SED SAME PHYS/QHP 5/>YRS: CPT | Performed by: INTERNAL MEDICINE

## 2020-12-03 PROCEDURE — C1769 GUIDE WIRE: HCPCS | Performed by: INTERNAL MEDICINE

## 2020-12-03 PROCEDURE — 88346 IMFLUOR 1ST 1ANTB STAIN PX: CPT | Performed by: PATHOLOGY

## 2020-12-03 PROCEDURE — 92978 ENDOLUMINL IVUS OCT C 1ST: CPT | Mod: LD | Performed by: INTERNAL MEDICINE

## 2020-12-03 PROCEDURE — 93505 ENDOMYOCARDIAL BIOPSY: CPT | Mod: 26,,, | Performed by: INTERNAL MEDICINE

## 2020-12-03 PROCEDURE — 85025 COMPLETE CBC W/AUTO DIFF WBC: CPT

## 2020-12-03 RX ORDER — ACETAMINOPHEN 325 MG/1
650 TABLET ORAL EVERY 4 HOURS PRN
Status: DISCONTINUED | OUTPATIENT
Start: 2020-12-03 | End: 2020-12-03 | Stop reason: HOSPADM

## 2020-12-03 RX ORDER — FENTANYL CITRATE 50 UG/ML
INJECTION, SOLUTION INTRAMUSCULAR; INTRAVENOUS
Status: DISCONTINUED | OUTPATIENT
Start: 2020-12-03 | End: 2020-12-03 | Stop reason: HOSPADM

## 2020-12-03 RX ORDER — HEPARIN SODIUM 200 [USP'U]/100ML
INJECTION, SOLUTION INTRAVENOUS
Status: DISCONTINUED | OUTPATIENT
Start: 2020-12-03 | End: 2020-12-03 | Stop reason: HOSPADM

## 2020-12-03 RX ORDER — MIDAZOLAM HYDROCHLORIDE 1 MG/ML
INJECTION, SOLUTION INTRAMUSCULAR; INTRAVENOUS
Status: DISCONTINUED | OUTPATIENT
Start: 2020-12-03 | End: 2020-12-03 | Stop reason: HOSPADM

## 2020-12-03 RX ORDER — HEPARIN SODIUM 1000 [USP'U]/ML
INJECTION, SOLUTION INTRAVENOUS; SUBCUTANEOUS
Status: DISCONTINUED | OUTPATIENT
Start: 2020-12-03 | End: 2020-12-03 | Stop reason: HOSPADM

## 2020-12-03 RX ORDER — ONDANSETRON 8 MG/1
8 TABLET, ORALLY DISINTEGRATING ORAL EVERY 8 HOURS PRN
Status: DISCONTINUED | OUTPATIENT
Start: 2020-12-03 | End: 2020-12-03 | Stop reason: HOSPADM

## 2020-12-03 RX ORDER — SODIUM CHLORIDE 9 MG/ML
INJECTION, SOLUTION INTRAVENOUS
Status: DISCONTINUED | OUTPATIENT
Start: 2020-12-03 | End: 2020-12-03 | Stop reason: HOSPADM

## 2020-12-03 RX ORDER — SODIUM CHLORIDE 9 MG/ML
INJECTION, SOLUTION INTRAVENOUS CONTINUOUS
Status: ACTIVE | OUTPATIENT
Start: 2020-12-03 | End: 2020-12-03

## 2020-12-03 RX ORDER — DIPHENHYDRAMINE HCL 50 MG
50 CAPSULE ORAL ONCE
Status: COMPLETED | OUTPATIENT
Start: 2020-12-03 | End: 2020-12-03

## 2020-12-03 RX ORDER — LIDOCAINE HYDROCHLORIDE 20 MG/ML
INJECTION, SOLUTION EPIDURAL; INFILTRATION; INTRACAUDAL; PERINEURAL
Status: DISCONTINUED | OUTPATIENT
Start: 2020-12-03 | End: 2020-12-03 | Stop reason: HOSPADM

## 2020-12-03 RX ADMIN — SODIUM CHLORIDE 200 ML/HR: 0.9 INJECTION, SOLUTION INTRAVENOUS at 09:12

## 2020-12-03 RX ADMIN — DIPHENHYDRAMINE HYDROCHLORIDE 50 MG: 50 CAPSULE ORAL at 09:12

## 2020-12-03 NOTE — Clinical Note
The site was marked. Prepped: groin, right radial and right neck. Prepped with: ChloraPrep. The patient was draped.

## 2020-12-03 NOTE — PLAN OF CARE
Received report from HELEN Stout. Patient s/p [facility] RHC, AAOx3. VSS, no c/o pain or discomfort at this time, resp even and unlabored. Gauze/tegaderm dressing to R IJ is CDI. Vasc band to R wrist is CDI. No active bleeding. No hematoma noted. Post procedure protocol reviewed with patient and patient's family. Understanding verbalized. Family members at bedside. Nurse call bell within reach. Will continue to monitor per post procedure protocol.

## 2020-12-03 NOTE — DISCHARGE SUMMARY
[facility]  Interventional Cardiology  Discharge Summary      Patient Name: Deborah Navas  MRN: [id number]  Admission Date: 12/3/2020  Hospital Length of Stay: 0 days  Discharge Date and Time:  12/03/2020 11:39 AM  Attending Physician: Daron Bills MD  Discharging Provider: Jonathan Manriquez MD  Primary Care Physician: Valeria Verma MD    HPI: Deborah Navas is a 50 y.o. female with a PMH significant for sarcoidosis stable, end stage NICM EF 15% s/p LVAD BTT s/p OHTX 12/19, Ventricular tachycardia (non since OHTX), mixed hyperlipidemia and obesity, improving. She is here for post cardiac transplant left and right heart cath routine surveillance. She denies chest pain or dyspnea.    Procedure(s) (LRB):  Left heart cath (Left)  IVUS, Coronary (Left)  INSERTION, CATHETER, RIGHT HEART (Right)  Biopsy, Cardiac (Right)     Indwelling Lines/Drains at time of discharge:  Lines/Drains/Airways     None                 Hospital Course (synopsis of major diagnoses, care, treatment, and services provided during the course of the hospital stay):   She tolerated LHC with IVUS well that showed normal coronaries with no significant CAV. RHC had normal CO/CI, and pressures. Biopsies completed with no complications.        Significant Diagnostic Studies: Labs:   BMP:   Recent Labs   Lab 12/02/20  0852 12/03/20  0830    95    141   K 3.5 3.6    102   CO2 28 29   BUN 26* 28*   CREATININE 1.4 1.5*   CALCIUM 9.8 9.5       Pending Diagnostic Studies:     None        Final Active Diagnoses:    Diagnosis Date Noted POA    Heart transplanted [Z94.1] 12/16/2019 Not Applicable      Problems Resolved During this Admission:       Discharged Condition: good    Follow Up:    Patient Instructions:   No discharge procedures on file.  Medications:  Reconciled Home Medications:      Medication List      ASK your doctor about these medications    amLODIPine 5 MG tablet  Commonly known  as: NORVASC  Take 1 tablet (5 mg total) by mouth once daily.     amoxicillin 500 MG capsule  Commonly known as: AMOXIL  Take 1 capsule (500 mg) by mouth twice daily.     aspirin 81 MG EC tablet  Commonly known as: ECOTRIN  Take 1 tablet (81 mg total) by mouth once daily.     atorvastatin 20 MG tablet  Commonly known as: LIPITOR  Take 1 tablet (20 mg total) by mouth once daily.     BIOTIN ORAL  Take 500 mg by mouth.     calcium carbonate-vitamin D3 1,000 mg(2,500 mg)-800 unit Tab  Take 1 tablet by mouth once daily.     ferrous sulfate 325 (65 FE) MG EC tablet  Take 1 tablet (325 mg total) by mouth 3 (three) times daily with meals. And take 4 oz of OJ or at least 250 mg of Vit C with each dose     gabapentin 300 MG capsule  Commonly known as: NEURONTIN  Take 1 capsule (300 mg total) by mouth every evening.     HYDROcodone-acetaminophen 5-325 mg per tablet  Commonly known as: NORCO  Take 1 tablet by mouth every 6 (six) hours as needed.     magnesium oxide 400 mg (241.3 mg magnesium) tablet  Commonly known as: MAG-OX  Take 1 tablet (400 mg total) by mouth once daily.     multivitamin capsule  Take 1 capsule by mouth once daily.     mycophenolate 250 mg Cap  Commonly known as: CELLCEPT  Take 4 capsules (1,000 mg total) by mouth 2 (two) times daily.     [facility] transplant care kit 1 Kit  Welcome to [facility].  This is your transplant care kit.     pantoprazole 40 MG tablet  Commonly known as: PROTONIX  Take 1 tablet (40 mg total) by mouth once daily.     potassium chloride SA 10 MEQ tablet  Commonly known as: K-DUR,KLOR-CON  Take 500 mEq by mouth once daily.     predniSONE 5 MG tablet  Commonly known as: DELTASONE  Take 1 tablet (5 mg total) by mouth once daily.     senna-docusate 8.6-50 mg 8.6-50 mg per tablet  Commonly known as: PERICOLACE  Take 2 tablets by mouth 2 (two) times daily as needed for Constipation.     tacrolimus 1 MG Cap  Commonly known as: PROGRAF  Take 3 capsules (3 mg total) by mouth every  12 (twelve) hours.     venlafaxine 150 MG Cp24  Commonly known as: EFFEXOR-XR  Take 1 capsule (150 mg total) by mouth once daily.     VITAMIN C 500 MG tablet  Generic drug: ascorbic acid (vitamin C)  Take 500 mg by mouth 3 (three) times daily.     zolpidem 12.5 MG CR tablet  Commonly known as: AMBIEN CR  TAKE 1 TABLET BY MOUTH EVERY DAY AT NIGHT AS NEEDED            Time spent on the discharge of patient: 30 minutes    Jonathan Manriquez MD  Interventional Cardiology  [facility]

## 2020-12-03 NOTE — Clinical Note
The catheter is inserted ostium   right coronary artery. Angiography performed of the right coronary arteries. Multiple views were taken.

## 2020-12-03 NOTE — Clinical Note
The catheter is inserted ostium   left main. Angiography performed of the left coronary arteries. Multiple views were taken.

## 2020-12-03 NOTE — INTERVAL H&P NOTE
The patient has been examined and the H&P has been reviewed:    I concur with the findings and no changes have occurred since H&P was written.    Anesthesia risks, benefits and alternative options discussed and understood by patient/family.    Transplanted Heart Surveillance [facility] with IVUS + RHC with Biopsy  Access R Radial and RIJ    Active Problem List with Overview Notes    Diagnosis Date Noted    Hyperlipidemia LDL goal <100 12/02/2020    Class 1 obesity due to excess calories with serious comorbidity and body mass index (BMI) of 31.0 to 31.9 in adult 12/02/2020    Decreased range of motion of right wrist 10/23/2020    Decreased  strength of right hand 10/23/2020    Cubital tunnel syndrome 10/09/2020    Cubital tunnel syndrome, bilateral 09/17/2020     moderate-severe demyelinating and axonal -slightly worse on right       Thenar muscle atrophy of left hand 07/13/2020    Thenar muscle atrophy of right hand 07/13/2020    Restrictive cardiomyopathy post heart transplant     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 01/02/2020    Heart transplanted 12/16/2019    Immunosuppression     Stage 3a chronic kidney disease 09/07/2019    Multinodular goiter 09/05/2019     She needs f/u u/s Sept 2020      Abnormal CT scan 09/05/2019    Knee pain 01/16/2015    Pes anserine bursitis 01/16/2015

## 2020-12-03 NOTE — Clinical Note
175 ml injected throughout the case. 25 mL total wasted during the case. 200 mL total used in the case.

## 2020-12-03 NOTE — PLAN OF CARE
Patient discharged per MD orders. Instructions given on medications, wound care, activity, signs of infection, when to call MD, and follow up appointments. Pt verbalized understanding. Telemetry and PIV removed. Patient and family taken by wc off unit.

## 2020-12-04 ENCOUNTER — PATIENT MESSAGE (OUTPATIENT)
Dept: TRANSPLANT | Facility: CLINIC | Age: 50
End: 2020-12-04

## 2020-12-04 ENCOUNTER — TELEPHONE (OUTPATIENT)
Dept: TRANSPLANT | Facility: CLINIC | Age: 50
End: 2020-12-04

## 2020-12-04 LAB
FINAL PATHOLOGIC DIAGNOSIS: NORMAL
GROSS: NORMAL
Lab: NORMAL
MICROSCOPIC EXAM: NORMAL

## 2020-12-07 ENCOUNTER — PATIENT MESSAGE (OUTPATIENT)
Dept: TRANSPLANT | Facility: CLINIC | Age: 50
End: 2020-12-07

## 2020-12-07 ENCOUNTER — ANESTHESIA EVENT (OUTPATIENT)
Dept: SURGERY | Facility: HOSPITAL | Age: 50
End: 2020-12-07
Payer: COMMERCIAL

## 2020-12-07 ENCOUNTER — TELEPHONE (OUTPATIENT)
Dept: RADIOLOGY | Facility: HOSPITAL | Age: 50
End: 2020-12-07

## 2020-12-07 LAB
POC ACTIVATED CLOTTING TIME K: 186 SEC (ref 74–137)
SAMPLE: ABNORMAL

## 2020-12-07 NOTE — ANESTHESIA PREPROCEDURE EVALUATION
Chart review complete. Patient's medical history reviewed.  OK to proceed at [facility].     Patient has history of sarcoidosis (stable) and NICM now s/p OHTx in 2019 who has successfully undergone a general anesthetic at [place] in October 2020.  Recent LHC showed clean coronaries, no evidence of new cardiac dysfunction.    Also of note, has previous documentation of a Grade IV view on direct laryngoscopy.  However, patient's most recent anesthetic at [facility] showed a Grade II view     12/7/2020 12/07/2020  Deborah Navas is a 50 y.o., female.  Pre-operative evaluation for Procedure(s) (LRB):  DECOMPRESSION, NERVE- LEFT wrist and elbow (Left)    Prev airway: Placement Date: 10/09/20; Placement Time: 0956 (created via procedure documentation); Method of Intubation: Video Laryngoscopy; Inserted by: CRNA; Airway Device: Endotracheal Tube; Mask Ventilation: Easy; Intubated: Postinduction; Blade: Warren #2; Airway Device Size: 7.0; Style: Cuffed; Cuff Inflation: Minimal occlusive pressure; Inflation Amount: 7; Placement Verified By: Capnometry, Colorimetric EtCO2 device, Auscultation, ETT Condensation; Grade: Grade II; Complicating Factors: Anterior larynx; Intubation Findings: Bilateral breath sounds, Positive EtCO2, Atraumatic/Condition of teeth unchanged;  Depth of Insertion: 21; Securment: Lips; Complications: None; Breath Sounds: Equal Bilateral; Insertion Attempts: 1; Removal Date: 10/09/20;  Removal Time: 1108        Patient Active Problem List   Diagnosis    Knee pain    Pes anserine bursitis    Multinodular goiter    Abnormal CT scan    Stage 3a chronic kidney disease    Heart transplanted    Immunosuppression    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    Restrictive cardiomyopathy post heart transplant    Thenar muscle atrophy of left hand    Thenar muscle  "atrophy of right hand    Cubital tunnel syndrome, bilateral    Cubital tunnel syndrome    Decreased range of motion of right wrist    Decreased  strength of right hand    Hyperlipidemia LDL goal <100    Class 1 obesity due to excess calories with serious comorbidity and body mass index (BMI) of 31.0 to 31.9 in adult    Thyroid nodule    Vitamin D deficiency disease    Osteopenia       Review of patient's allergies indicates:   Allergen Reactions    Adhesive Blisters     "Reaction to chest only up to neck. Denies any problems w/adhesive on any other areas of the body.    Codeine Itching        Current Facility-Administered Medications on File Prior to Encounter   Medication Dose Route Frequency Provider Last Rate Last Dose    fentaNYL injection 25 mcg  25 mcg Intravenous Q5 Min PRN Shelly Skinner MD        midazolam (VERSED) 1 mg/mL injection 0.5 mg  0.5 mg Intravenous PRN Shelly Skinner MD         Current Outpatient Medications on File Prior to Encounter   Medication Sig Dispense Refill    amLODIPine (NORVASC) 5 MG tablet Take 1 tablet (5 mg total) by mouth once daily. 30 tablet 11    aspirin (ECOTRIN) 81 MG EC tablet Take 1 tablet (81 mg total) by mouth once daily. 30 tablet 11    atorvastatin (LIPITOR) 20 MG tablet Take 1 tablet (20 mg total) by mouth once daily. (Patient taking differently: Take 20 mg by mouth every evening. ) 90 tablet 3    calcium carbonate-vitamin D3 1,000 mg(2,500 mg)-800 unit Tab Take 1 tablet by mouth once daily. 30 tablet 11    ferrous sulfate 325 (65 FE) MG EC tablet Take 1 tablet (325 mg total) by mouth 3 (three) times daily with meals. And take 4 oz of OJ or at least 250 mg of Vit C with each dose 90 tablet 3    gabapentin (NEURONTIN) 300 MG capsule Take 1 capsule (300 mg total) by mouth every evening. 30 capsule 11    HYDROcodone-acetaminophen (NORCO) 5-325 mg per tablet Take 1 tablet by mouth every 6 (six) hours as needed. (Patient not taking: Reported on " 12/14/2020) 28 tablet 0    magnesium oxide (MAG-OX) 400 mg (241.3 mg magnesium) tablet Take 1 tablet (400 mg total) by mouth once daily. 30 tablet 11    mycophenolate (CELLCEPT) 250 mg Cap Take 4 capsules (1,000 mg total) by mouth 2 (two) times daily. 240 capsule 11    opw transplant care kit Welcome to [facility].  This is your transplant care kit. 1 kit 0    pantoprazole (PROTONIX) 40 MG tablet Take 1 tablet (40 mg total) by mouth once daily. 30 tablet 11    predniSONE (DELTASONE) 5 MG tablet Take 1 tablet (5 mg total) by mouth once daily. 30 tablet 12    senna-docusate 8.6-50 mg (PERICOLACE) 8.6-50 mg per tablet Take 2 tablets by mouth 2 (two) times daily as needed for Constipation.      tacrolimus (PROGRAF) 1 MG Cap Take 3 capsules (3 mg total) by mouth every 12 (twelve) hours. 180 capsule 11    venlafaxine (EFFEXOR-XR) 150 MG Cp24 Take 1 capsule (150 mg total) by mouth once daily. 30 capsule 11    zolpidem (AMBIEN CR) 12.5 MG CR tablet TAKE 1 TABLET BY MOUTH EVERY DAY AT NIGHT AS NEEDED         Past Surgical History:   Procedure Laterality Date    BACK SURGERY      2007    BIOPSY WITH ULTRASOUND GUIDANCE N/A 12/31/2019    Procedure: BIOPSY, WITH US GUIDANCE;  Surgeon: Eric Antunez Jr., MD;  Location: [facility] CATH LAB;  Service: Cardiology;  Laterality: N/A;    BIOPSY WITH ULTRASOUND GUIDANCE N/A 1/7/2020    Procedure: BIOPSY, WITH US GUIDANCE;  Surgeon: Renetta Chaudhari MD;  Location: [facility] CATH LAB;  Service: Cardiology;  Laterality: N/A;    BIOPSY WITH ULTRASOUND GUIDANCE N/A 1/14/2020    Procedure: BIOPSY, WITH US GUIDANCE;  Surgeon: Renetta Chaudhari MD;  Location: [facility] CATH LAB;  Service: Cardiology;  Laterality: N/A;    BIOPSY WITH ULTRASOUND GUIDANCE N/A 1/28/2020    Procedure: BIOPSY, WITH US GUIDANCE;  Surgeon: Jolene Lua MD;  Location: [facility] CATH LAB;  Service: Cardiology;  Laterality: N/A;    BIOPSY WITH ULTRASOUND GUIDANCE N/A 2/11/2020    Procedure: BIOPSY, WITH  US GUIDANCE;  Surgeon: Renetta Chaudhari MD;  Location: [facility] CATH LAB;  Service: Cardiology;  Laterality: N/A;    BIOPSY WITH ULTRASOUND GUIDANCE N/A 3/10/2020    Procedure: BIOPSY, WITH US GUIDANCE;  Surgeon: Jolene Lua MD;  Location: [facility] CATH LAB;  Service: Cardiology;  Laterality: N/A;    BONE GRAFT Left     from Left hip to Left FA    DECOMPRESSION OF NERVE Right 10/9/2020    Procedure: DECOMPRESSION, NERVE ulnar right elbow;  Surgeon: Shelly Vasques MD;  Location: [facility];  Service: Orthopedics;  Laterality: Right;  General/Regional    eardrum reconstruction  1980    ELBOW SURGERY Left [id number]    FOREARM FRACTURE SURGERY Bilateral [id number]    multiple surgeries    HEART TRANSPLANT N/A 12/16/2019    Procedure: TRANSPLANT, HEART;  Surgeon: Talha Nuñez MD;  Location: [address];  Service: Cardiothoracic;  Laterality: N/A;    INSERTION OF GRAFT TO PERICARDIUM  9/10/2019    Procedure: INSERTION, GRAFT, PERICARDIUM;  Surgeon: Shar Walden MD;  [address];  Service: Cardiovascular;;    INSERTION OF IMPLANTABLE CARDIOVERTER-DEFIBRILLATOR (ICD) GENERATOR WITH TWO EXISTING LEADS      INSERTION OF PACEMAKER Left     LEFT HEART CATHETERIZATION Left 12/3/2020    Procedure: Left heart cath;  Surgeon: Daron Bills MD;  Location: [facility] CATH LAB;  Service: Cardiology;  Laterality: Left;    LEFT VENTRICULAR ASSIST DEVICE N/A 9/10/2019    Procedure: INSERTION-LEFT VENTRICULAR ASSIST DEVICE;  Surgeon: Shar Walden MD;  [address];  Service: Cardiovascular;  Laterality: N/A;  DT HM3     LUMBAR FUSION  2007    L4-L5    RIGHT HEART CATHETERIZATION Right 9/4/2019    Procedure: INSERTION, CATHETER, RIGHT HEART;  Surgeon: Vi Bryant MD;  Location: [facility] CATH LAB;  Service: Cardiology;  Laterality: Right;    RIGHT HEART CATHETERIZATION N/A 11/18/2019    Procedure: INSERTION, CATHETER, RIGHT HEART;  Surgeon: Eric Antunez Jr., MD;  Location: [facility] CATH  LAB;  Service: Cardiology;  Laterality: N/A;    RIGHT HEART CATHETERIZATION Right 12/31/2019    Procedure: INSERTION, CATHETER, RIGHT HEART;  Surgeon: Eric Antunez Jr., MD;  Location: [facility] CATH LAB;  Service: Cardiology;  Laterality: Right;    RIGHT HEART CATHETERIZATION Right 1/7/2020    Procedure: INSERTION, CATHETER, RIGHT HEART;  Surgeon: Renetta Chaudhari MD;  Location: [facility] CATH LAB;  Service: Cardiology;  Laterality: Right;    RIGHT HEART CATHETERIZATION Right 12/3/2020    Procedure: INSERTION, CATHETER, RIGHT HEART;  Surgeon: Daron Bills MD;  Location: [facility] CATH LAB;  Service: Cardiology;  Laterality: Right;    SINUS SURGERY Right 1994    with lymph nodes    STERNAL WOUND CLOSURE  9/10/2019    Procedure: CLOSURE, WOUND, STERNUM;  Surgeon: Shar Walden MD;  Location: [facility] OR [address] [place] [address];  Service: Cardiovascular;;    TEMPOROMANDIBULAR JOINT SURGERY Right 1988    TONSILLECTOMY      TREATMENT OF CARDIAC ARRHYTHMIA N/A 9/24/2019    Procedure: CARDIOVERSION;  Surgeon: Moe Barrera MD;  Location: [facility] EP LAB;  Service: Cardiology;  Laterality: N/A;  AF, DCCV/NADINE, anes, DM, Rm 3073    TYMPANOSTOMY TUBE PLACEMENT  1971- 1979    multiple tube placements    WOUND EXPLORATION Right 5/27/2020    Procedure: EXPLORATION, WOUND. Lymphocele;  Surgeon: NYDIA Bledsoe II, MD;  Location: [facility] OR [address] [place] [address];  Service: Cardiovascular;  Laterality: Right;       Social History     Socioeconomic History    Marital status: Single     Spouse name: Not on file    Number of children: Not on file    Years of education: Not on file    Highest education level: Not on file   Occupational History    Not on file   Social Needs    Financial resource strain: Not on file    Food insecurity     Worry: Not on file     Inability: Not on file    Transportation needs     Medical: Not on file     Non-medical: Not on file   Tobacco Use    Smoking status: Never Smoker    Smokeless tobacco: Never Used   Substance and Sexual  Activity    Alcohol use: No    Drug use: No    Sexual activity: Not Currently   Lifestyle    Physical activity     Days per week: Not on file     Minutes per session: Not on file    Stress: Not on file   Relationships    Social connections     Talks on phone: Not on file     Gets together: Not on file     Attends [employer] witness service: Not on file     Active member of club or organization: Not on file     Attends meetings of clubs or organizations: Not on file     Relationship status: Not on file   Other Topics Concern    Not on file   Social History Narrative    Not on file         Vital Signs Range (Last 24H):         CBC: No results for input(s): WBC, RBC, HGB, HCT, PLT, MCV, MCH, MCHC in the last 72 hours.    CMP: No results for input(s): NA, K, CL, CO2, BUN, CREATININE, GLU, MG, PHOS, CALCIUM, ALBUMIN, PROT, ALKPHOS, ALT, AST, BILITOT in the last 72 hours.    INR  No results for input(s): PT, INR, PROTIME, APTT in the last 72 hours.        Diagnostic Studies:    2D Echo:  · Patient is s/p cardiac transplant.  · Tachycardia was present during the study  · The left ventricle is normal in size with normal systolic function. The estimated ejection fraction is 65%.  · Normal left ventricular diastolic function.  · Normal right ventricular size with normal right ventricular systolic function.  · The estimated PA systolic pressure is 27 mmHg.  · Normal central venous pressure (3 mmHg).    Left heart cath 12/4/20  · The coronary arteries were normal..  · Maximal intimal thickness of LAD was 0mm, in IVUS images  · The filling pressures on the right and left were normal. successful RVBX, under fluoro guidance perfomed. A sample was sent for immunofluorescence. Total 5 samples taken.  · The pre-procedure left ventricular end diastolic pressure was 11.  · The estimated blood loss was none.  · In order to adequately assess the patient and form an appropriate treatment plan, it was necessary to perform a comprehensive  right heart catheterization. A thorough study of the patient's cardiac and hemodynamic functioning, in addition to an endomyocardial biopsy, was performed. The information to be obtained from the biopsy alone was insufficient to care for this patient.     Procedures performed  Rt radial artery access  US guided Rt IJ vein access   Coronary angiography  Left heart catheterization  RHC   IVUS of coronary  cardiac biopsy     Anesthesia Evaluation    I have reviewed the Patient Summary Reports.    I have reviewed the Nursing Notes. I have reviewed the NPO Status.   I have reviewed the Medications.     Review of Systems  Anesthesia Hx:  No problems with previous Anesthesia  History of prior surgery of interest to airway management or planning: Denies Family Hx of Anesthesia complications.   Denies Personal Hx of Anesthesia complications.   Social:  No Alcohol Use, Non-Smoker    Hematology/Oncology:  Hematology Normal   Oncology Normal     EENT/Dental:EENT/Dental Normal   Cardiovascular:  Cardiovascular Normal     Pulmonary:   sarcoidosis   Renal/:   Chronic Renal Disease    Hepatic/GI:  Hepatic/GI Normal    Musculoskeletal:   Arthritis     Neurological:   Neuromuscular Disease, Headaches   Peripheral Neuropathy    Endocrine:  Endocrine Normal    Dermatological:  Skin Normal    Psych:  Psychiatric Normal           Physical Exam  General:  Well nourished, Obesity    Airway/Jaw/Neck:  Airway Findings: Mouth Opening: Normal Tongue: Normal  General Airway Assessment: Adult  Mallampati: III  Improves to I with phonation.  TM Distance: Normal, at least 6 cm      Dental:  Dental Findings: Prominent Incisors    Chest/Lungs:  Chest/Lungs Findings: Clear to auscultation, Normal Respiratory Rate     Heart/Vascular:  Heart Findings: Rate: Normal  Rhythm: Regular Rhythm  Sounds: Normal        Mental Status:  Mental Status Findings:  Cooperative, Alert and Oriented         Anesthesia Plan  Type of Anesthesia, risks & benefits  discussed:  Anesthesia Type:  general, regional  Patient's Preference: post-op supraclav ss  Intra-op Monitoring Plan: standard ASA monitors  Intra-op Monitoring Plan Comments:   Post Op Pain Control Plan: multimodal analgesia  Post Op Pain Control Plan Comments:   Induction:   IV  Beta Blocker:  Patient is not currently on a Beta-Blocker (No further documentation required).       Informed Consent: Patient understands risks and agrees with Anesthesia plan.  Questions answered. Anesthesia consent signed with patient.  ASA Score: 3     Day of Surgery Review of History & Physical:    H&P update referred to the surgeon.         Ready For Surgery From Anesthesia Perspective.

## 2020-12-08 ENCOUNTER — LAB VISIT (OUTPATIENT)
Dept: LAB | Facility: HOSPITAL | Age: 50
End: 2020-12-08
Attending: INTERNAL MEDICINE
Payer: COMMERCIAL

## 2020-12-08 ENCOUNTER — HOSPITAL ENCOUNTER (OUTPATIENT)
Dept: RADIOLOGY | Facility: HOSPITAL | Age: 50
Discharge: HOME | End: 2020-12-08
Attending: INTERNAL MEDICINE
Payer: COMMERCIAL

## 2020-12-08 ENCOUNTER — OFFICE VISIT (OUTPATIENT)
Dept: DERMATOLOGY | Facility: CLINIC | Age: 50
End: 2020-12-08
Payer: COMMERCIAL

## 2020-12-08 DIAGNOSIS — N18.4 CKD (CHRONIC KIDNEY DISEASE), STAGE IV: ICD-10-CM

## 2020-12-08 DIAGNOSIS — Z12.83 SKIN CANCER SCREENING: Primary | ICD-10-CM

## 2020-12-08 DIAGNOSIS — D22.9 MULTIPLE BENIGN NEVI: ICD-10-CM

## 2020-12-08 DIAGNOSIS — D18.00 HEMANGIOMA, UNSPECIFIED SITE: ICD-10-CM

## 2020-12-08 DIAGNOSIS — L81.4 SOLAR LENTIGO: ICD-10-CM

## 2020-12-08 DIAGNOSIS — E04.2 MULTIPLE THYROID NODULES: ICD-10-CM

## 2020-12-08 PROCEDURE — 80048 BASIC METABOLIC PNL TOTAL CA: CPT

## 2020-12-08 PROCEDURE — 99203 PR OFFICE/OUTPT VISIT, NEW, LEVL III, 30-44 MIN: ICD-10-PCS | Mod: S$GLB,,, | Performed by: STUDENT IN AN ORGANIZED HEALTH CARE EDUCATION/TRAINING PROGRAM

## 2020-12-08 PROCEDURE — 99999 PR PBB SHADOW E&M-EST. PATIENT-LVL III: ICD-10-PCS | Mod: PBBFAC,,, | Performed by: STUDENT IN AN ORGANIZED HEALTH CARE EDUCATION/TRAINING PROGRAM

## 2020-12-08 PROCEDURE — 99999 PR PBB SHADOW E&M-EST. PATIENT-LVL III: CPT | Mod: PBBFAC,,, | Performed by: STUDENT IN AN ORGANIZED HEALTH CARE EDUCATION/TRAINING PROGRAM

## 2020-12-08 PROCEDURE — 99203 OFFICE O/P NEW LOW 30 MIN: CPT | Mod: S$GLB,,, | Performed by: STUDENT IN AN ORGANIZED HEALTH CARE EDUCATION/TRAINING PROGRAM

## 2020-12-08 PROCEDURE — 76536 US SOFT TISSUE HEAD NECK THYROID: ICD-10-PCS | Mod: 26,,, | Performed by: RADIOLOGY

## 2020-12-08 PROCEDURE — 76536 US EXAM OF HEAD AND NECK: CPT | Mod: 26,,, | Performed by: RADIOLOGY

## 2020-12-08 PROCEDURE — 76536 US EXAM OF HEAD AND NECK: CPT | Mod: TC

## 2020-12-08 PROCEDURE — 36415 COLL VENOUS BLD VENIPUNCTURE: CPT

## 2020-12-08 NOTE — LETTER
December 8, 2020      Eric Antunez Jr., MD  [address]  [facility] [address]           [facility]  [address]  [place] [address]  Phone: [phone]  Fax: [phone]          Patient: Deborah Navas   MR Number: [id number]   YOB: 1970   Date of Visit: 12/8/2020       Dear Dr. Eric Antunez Jr.:    Thank you for referring Deborah Navas to me for evaluation. Attached you will find relevant portions of my assessment and plan of care.    If you have questions, please do not hesitate to call me. I look forward to following Deborah Navas along with you.    Sincerely,    Ana Grimaldo MD    Enclosure  CC:  No Recipients    If you would like to receive this communication electronically, please contact [email or web address] or [phone] to request more information on [employer] Link access.    For providers and/or their staff who would like to refer a patient to [facility], please contact us through our one-stop-shop provider referral line, [facility], at [phone].    If you feel you have received this communication in error or would no longer like to receive these types of communications, please e-mail [email or web address]

## 2020-12-08 NOTE — PROGRESS NOTES
Subjective:       Patient ID:  Deborah Navas is a 50 y.o. female who presents for   Chief Complaint   Patient presents with    Skin Check     Patient with new complaint of lesion(s)  Location: face  Duration: years  Symptoms: redness  Relieving factors/Previous treatments: none    Patient with hx of BCC on left lower eyelid. She denies family hx of melanoma.        Review of Systems   Skin: Positive for activity-related sunscreen use and wears hat. Negative for itching, rash, daily sunscreen use (sometimes) and recent sunburn.   Hematologic/Lymphatic: Bruises/bleeds easily.        Objective:    Physical Exam   Constitutional: She appears well-developed and well-nourished. No distress.   Neurological: She is alert and oriented to person, place, and time. She is not disoriented.   Psychiatric: She has a normal mood and affect.   Skin:   Areas Examined (abnormalities noted in diagram):   Scalp / Hair Palpated and Inspected  Head / Face Inspection Performed  Neck Inspection Performed  Chest / Axilla Inspection Performed  Abdomen Inspection Performed  Genitals / Buttocks / Groin Inspection Performed  Back Inspection Performed  RUE Inspected  LUE Inspection Performed  RLE Inspected  LLE Inspection Performed  Nails and Digits Inspection Performed                       Diagram Legend     Erythematous scaling macule/papule c/w actinic keratosis       Vascular papule c/w angioma      Pigmented verrucoid papule/plaque c/w seborrheic keratosis      Yellow umbilicated papule c/w sebaceous hyperplasia      Irregularly shaped tan macule c/w lentigo     1-2 mm smooth white papules consistent with Milia      Movable subcutaneous cyst with punctum c/w epidermal inclusion cyst      Subcutaneous movable cyst c/w pilar cyst      Firm pink to brown papule c/w dermatofibroma      Pedunculated fleshy papule(s) c/w skin tag(s)      Evenly pigmented macule c/w junctional nevus     Mildly variegated pigmented, slightly  irregular-bordered macule c/w mildly atypical nevus      Flesh colored to evenly pigmented papule c/w intradermal nevus       Pink pearly papule/plaque c/w basal cell carcinoma      Erythematous hyperkeratotic cursted plaque c/w SCC      Surgical scar with no sign of skin cancer recurrence      Open and closed comedones      Inflammatory papules and pustules      Verrucoid papule consistent consistent with wart     Erythematous eczematous patches and plaques     Dystrophic onycholytic nail with subungual debris c/w onychomycosis     Umbilicated papule    Erythematous-base heme-crusted tan verrucoid plaque consistent with inflamed seborrheic keratosis     Erythematous Silvery Scaling Plaque c/w Psoriasis     See annotation      Assessment / Plan:        Skin cancer screening  Area of previous nonmelanoma examined. Site well healed with no signs of recurrence.    Total body skin examination performed today including at least 12 points as noted in physical examination. No lesions suspicious for malignancy noted.    Solar lentigo  This is a benign hyperpigmented sun induced lesion. Daily sun protection will reduce the number of new lesions. Treatment of these benign lesions are considered cosmetic.    Multiple benign nevi  Discussed ABCDE's of nevi.  Monitor for new mole or moles that are becoming bigger, darker, irritated, or developing irregular borders. Brochure provided.    Hemangioma, unspecified site  This is a benign vascular lesion. Reassurance given. No treatment required.              Follow up in about 1 year (around 12/8/2021).

## 2020-12-09 ENCOUNTER — TELEPHONE (OUTPATIENT)
Dept: TRANSPLANT | Facility: CLINIC | Age: 50
End: 2020-12-09

## 2020-12-09 DIAGNOSIS — E04.1 THYROID NODULE: Primary | ICD-10-CM

## 2020-12-09 LAB
ANION GAP SERPL CALC-SCNC: 17 MMOL/L (ref 8–16)
BUN SERPL-MCNC: 25 MG/DL (ref 6–20)
CALCIUM SERPL-MCNC: 9.9 MG/DL (ref 8.7–10.5)
CHLORIDE SERPL-SCNC: 101 MMOL/L (ref 95–110)
CO2 SERPL-SCNC: 24 MMOL/L (ref 23–29)
CREAT SERPL-MCNC: 1.6 MG/DL (ref 0.5–1.4)
EST. GFR  (AFRICAN AMERICAN): 42.7 ML/MIN/1.73 M^2
EST. GFR  (NON AFRICAN AMERICAN): 37 ML/MIN/1.73 M^2
GLUCOSE SERPL-MCNC: 148 MG/DL (ref 70–110)
POTASSIUM SERPL-SCNC: 3.5 MMOL/L (ref 3.5–5.1)
SODIUM SERPL-SCNC: 142 MMOL/L (ref 136–145)

## 2020-12-09 NOTE — TELEPHONE ENCOUNTER
Called pt to let her know that she will need a FNA of thyroid nodule of LL lobe which meets the T1Rads criteria. The only days that she has other appointments are this Friday 12/11 for tooth extraction and 12/18 Friday for ortho surgery. I told her it is usually done through Endocrine. I will let her know.     I encouraged her to hydrate due to rising creatinine.

## 2020-12-10 ENCOUNTER — PATIENT MESSAGE (OUTPATIENT)
Dept: TRANSPLANT | Facility: CLINIC | Age: 50
End: 2020-12-10

## 2020-12-11 NOTE — PROGRESS NOTES
Subjective:      Patient ID: Deborah Navas is a 50 y.o. female.    Chief Complaint:  Thyroid Problem    History of Present Illness  Deborah Navas presents today for evaluation & management of thyroid nodule. This is her first visit with me.     Heart transplant in 2019    With regards to thyroid nodule,     The thyroid nodule was found on XRAY  Last thyroid u/s: 2020 see below    The patient was not aware of the thyroid nodule prior   No difficulty breathing swallowing   No voice changes  No FH of thyroid cancer  No personal history of radiation treatment or exposure     No signs or symptoms of hyper or hypothyroidism    + tremors -relates to prednisone  + anxiety -relates to transplant  + heat intolerance  + hair loss   + decreased concentration     Any blood thinners-ASA 81 mg daily    12/8/2020 US thyroid   COMPARISON:  None     FINDINGS:  The thyroid gland demonstrates a heterogeneous echotexture.  The isthmus measures 3.2 mm in thickness.  The right lobe of the thyroid gland measures 5.3 cm in length.  The left lobe of the thyroid gland measures 7.0 cm in length.     There is a 6 mm coarse calcification seen within the midpole of the left lobe of the thyroid gland.  There is also a 2.8 cm isoechoic nodule noted within the lower pole of the left lobe of the thyroid gland.   Impression:  1. Solid isoechoic nodule within the lower pole of the left lobe of the thyroid gland measuring up to 2.8 cm in size and meeting criteria for FNA per TI rads criteria    Lab Results   Component Value Date    TSH 2.455 11/30/2020       Had right anterior cervical lymph node excision in 1992   Had lymphocele removed after her heart transplant in her right groin -May 2020    8/21/2020 DXA   COMPARISON:  None   FINDINGS:  The L1-L3 vertebral bone mineral density is equal to 1.058 g/cm squared with a T score of -1.0.   The left femoral neck bone mineral density is equal to 0.818 g/cm squared with a T score of  "-1.6.   There is a 14.3% risk of a major osteoporotic fracture and a a 1.7% risk of hip fracture in the next 10 years (FRAX).   Impression:  Osteopenia    She is not on Vitamin D at this time     Ref. Range 12/24/2019 06:55   Vit D, 25-Hydroxy Latest Ref Range: 30 - 96 ng/mL 12 (L)     Review of Systems   Constitutional: Positive for fatigue.   HENT: Negative for trouble swallowing and voice change.    Eyes: Negative for visual disturbance.   Respiratory: Negative for shortness of breath.    Cardiovascular: Negative for chest pain.   Gastrointestinal: Negative for abdominal pain, constipation and diarrhea.   Endocrine: Positive for heat intolerance.   Musculoskeletal: Negative for arthralgias.   Skin: Negative for wound.   Neurological: Positive for tremors. Negative for headaches.   Hematological: Does not bruise/bleed easily.   Psychiatric/Behavioral: Positive for agitation and decreased concentration. Negative for sleep disturbance.       Objective:   Physical Exam    Visit Vitals  /86   Pulse (!) 113   Ht 5' 8" (1.727 m)   Wt 94 kg (207 lb 3.7 oz)   LMP  (LMP Unknown)   SpO2 98%   BMI 31.51 kg/m²        Lab Review:   Lab Results   Component Value Date    HGBA1C 4.2 11/30/2020    HGBA1C 5.8 (H) 01/14/2020    HGBA1C 6.5 (H) 09/06/2019      Lab Results   Component Value Date    CHOL 126 11/30/2020    HDL 52 11/30/2020    LDLCALC 56.4 (L) 11/30/2020    TRIG 88 11/30/2020    CHOLHDL 41.3 11/30/2020     Lab Results   Component Value Date     12/08/2020    K 3.5 12/08/2020     12/08/2020    CO2 24 12/08/2020     (H) 12/08/2020    BUN 25 (H) 12/08/2020    CREATININE 1.6 (H) 12/08/2020    CALCIUM 9.9 12/08/2020    PROT 7.0 11/30/2020    ALBUMIN 4.4 11/30/2020    BILITOT 0.8 11/30/2020    ALKPHOS 72 11/30/2020    AST 14 11/30/2020    ALT 10 11/30/2020    ANIONGAP 17 (H) 12/08/2020    ESTGFRAFRICA 42.7 (A) 12/08/2020    EGFRNONAA 37.0 (A) 12/08/2020    TSH 2.455 11/30/2020     Vit D, 25-Hydroxy "   Date Value Ref Range Status   12/24/2019 12 (L) 30 - 96 ng/mL Final     Comment:     Vitamin D deficiency.........<10 ng/mL                              Vitamin D insufficiency......10-29 ng/mL       Vitamin D sufficiency........> or equal to 30 ng/mL  Vitamin D toxicity............>100 ng/mL       Assessment and Plan     1. Thyroid nodule  Ambulatory referral/consult to Endocrinology    US FNA Thyroid, 1st Lesion    TSH    US Soft Tissue Head Neck Thyroid   2. Vitamin D deficiency disease  Vitamin D   3. Osteopenia, unspecified location         Thyroid nodule  I have reviewed management options including observation, FNA or surgery.  All of the patients questions were answered.  Will proceed with FNA  Discussed indications for repeat FNA as well as surgical indications (abnormal FNA, compressive symptoms or interval change).  Discussed possible results of FNA- Benign, Malignant, FLUS, or insufficient cells.   Patient is not on any blood thinners     If FNA is negative then plan RTO in 1 year with TSH and thyroid u/s prior        Vitamin D deficiency disease  -- Based on Vit D in 2019  -- Start OTC Vit D 2000 IU daily    Osteopenia  -- Avoid alcohol and tobacco  -- Encouraged weight bearing exercise  -- RDA of calcium is 5202-1002 mg daily, preferably from food -given list      Follow up in about 1 year (around 12/14/2021).    Case discussed with Dr. Zelaya  Recommendations were discussed with the patient in detail  The patient verbalized understanding and agrees with the plan outlined as above.

## 2020-12-14 ENCOUNTER — OFFICE VISIT (OUTPATIENT)
Dept: ENDOCRINOLOGY | Facility: CLINIC | Age: 50
End: 2020-12-14
Payer: COMMERCIAL

## 2020-12-14 VITALS
BODY MASS INDEX: 31.41 KG/M2 | SYSTOLIC BLOOD PRESSURE: 124 MMHG | HEART RATE: 113 BPM | DIASTOLIC BLOOD PRESSURE: 86 MMHG | OXYGEN SATURATION: 98 % | HEIGHT: 68 IN | WEIGHT: 207.25 LBS

## 2020-12-14 DIAGNOSIS — E55.9 VITAMIN D DEFICIENCY DISEASE: ICD-10-CM

## 2020-12-14 DIAGNOSIS — E04.1 THYROID NODULE: Primary | ICD-10-CM

## 2020-12-14 DIAGNOSIS — M85.80 OSTEOPENIA, UNSPECIFIED LOCATION: ICD-10-CM

## 2020-12-14 PROCEDURE — 1126F PR PAIN SEVERITY QUANTIFIED, NO PAIN PRESENT: ICD-10-PCS | Mod: S$GLB,,, | Performed by: NURSE PRACTITIONER

## 2020-12-14 PROCEDURE — 3074F PR MOST RECENT SYSTOLIC BLOOD PRESSURE < 130 MM HG: ICD-10-PCS | Mod: CPTII,S$GLB,, | Performed by: NURSE PRACTITIONER

## 2020-12-14 PROCEDURE — 3074F SYST BP LT 130 MM HG: CPT | Mod: CPTII,S$GLB,, | Performed by: NURSE PRACTITIONER

## 2020-12-14 PROCEDURE — 99999 PR PBB SHADOW E&M-EST. PATIENT-LVL V: ICD-10-PCS | Mod: PBBFAC,,, | Performed by: NURSE PRACTITIONER

## 2020-12-14 PROCEDURE — 99214 PR OFFICE/OUTPT VISIT, EST, LEVL IV, 30-39 MIN: ICD-10-PCS | Mod: S$GLB,,, | Performed by: NURSE PRACTITIONER

## 2020-12-14 PROCEDURE — 99999 PR PBB SHADOW E&M-EST. PATIENT-LVL V: CPT | Mod: PBBFAC,,, | Performed by: NURSE PRACTITIONER

## 2020-12-14 PROCEDURE — 1126F AMNT PAIN NOTED NONE PRSNT: CPT | Mod: S$GLB,,, | Performed by: NURSE PRACTITIONER

## 2020-12-14 PROCEDURE — 3008F BODY MASS INDEX DOCD: CPT | Mod: CPTII,S$GLB,, | Performed by: NURSE PRACTITIONER

## 2020-12-14 PROCEDURE — 3079F PR MOST RECENT DIASTOLIC BLOOD PRESSURE 80-89 MM HG: ICD-10-PCS | Mod: CPTII,S$GLB,, | Performed by: NURSE PRACTITIONER

## 2020-12-14 PROCEDURE — 3079F DIAST BP 80-89 MM HG: CPT | Mod: CPTII,S$GLB,, | Performed by: NURSE PRACTITIONER

## 2020-12-14 PROCEDURE — 99214 OFFICE O/P EST MOD 30 MIN: CPT | Mod: S$GLB,,, | Performed by: NURSE PRACTITIONER

## 2020-12-14 PROCEDURE — 3008F PR BODY MASS INDEX (BMI) DOCUMENTED: ICD-10-PCS | Mod: CPTII,S$GLB,, | Performed by: NURSE PRACTITIONER

## 2020-12-14 NOTE — PATIENT INSTRUCTIONS
Thyroid Nodule   A FNA, or fine needle aspiration, is a biopsy of your thyroid nodule. You will have an ultrasound to find the thyroid nodule. The doctor will use lidocaine spray to numb neck. A needle is inserted into your neck 4-8 times to obtain a representative sample of the entire thyroid nodule. The thyroid tissue is sent off to pathology. The pathology results can take around 1 week to result. The thyroid pathology can come back as: 1. Benign meaning no cancer; 2. Malignant meaning cancer; 3. Indeterminate meaning that we need an additional sample (repeat the test) because either A. We did not obtain enough sample (just like when your PAP is inconclusive) or B. The sample needs further testing (repeat the test).     Osteopenia   Weight bearing exercise helps strengthen bones    Avoid alcohol and tobacco   RDA of vitamin d is 5252-4629 mg daily   RDA of calcium is 9123-2180 mg daily; preferable from food   Estimated Calcium Content of Foods:  Produce  Serving Size Estimated Calcium*    [place]air greens, frozen 8 oz 360 mg   Broccoli conrad 8 oz 200 mg   Kale, frozen 8 oz 180 mg   Soy Beans, green, boiled 8 oz 175 mg   Bok Bernabe, cooked, boiled 8 oz 160 mg   Figs, dried 2 figs 65 mg   Broccoli, fresh, cooked 8 oz 60 mg   Oranges 1 whole 55 mg   Seafood Serving Size Estimated Calcium*    Sardines, canned with bones 3 oz 325 mg   [place], canned with bones 3 oz 180 mg   Shrimp, canned 3 oz 125 mg   Dairy Serving Size Estimated Calcium*    Ricotta, part-skim 4 oz 335 mg   Yogurt, plain, low-fat 6 oz 310 mg   Milk, skim, low-fat, whole 8 oz 300 mg   Yogurt with fruit, low-fat 6 oz 260 mg   Mozzarella, part-skim 1 oz 210 mg   Cheddar 1 oz 205 mg   Yogurt, Greek 6 oz 200 mg   American Cheese 1 oz 195 mg   Feta Cheese 4 oz 140 mg   Cottage Cheese, 2% 4 oz 105 mg   Frozen yogurt, vanilla 8 oz 105 mg   Ice Cream, vanilla 8 oz 85 mg   Parmesan 1 tbsp 55 mg   Fortified Food Serving Size Estimated Calcium*   [place] milk, rice  milk or soy milk, fortified 8 oz 300 mg   Orange juice and other fruit juices, fortified 8 oz 300 mg   Tofu, prepared with calcium 4 oz 205 mg   Waffle, frozen, fortified 2 pieces 200 mg   Oatmeal, fortified 1 packet 140 mg   English muffin, fortified 1 muffin 100 mg   Cereal, fortified 8 oz 100-1,000 mg   Other Serving Size Estimated Calcium*   Mac & cheese, frozen 1 package 325 mg   Pizza, cheese, frozen 1 serving 115 mg   Pudding, chocolate, prepared with 2% milk 4 oz 160 mg   Beans, baked, canned 4 oz 160 mg   *The calcium content listed for most foods is estimated and can vary due to multiple factors. Check the food label to determine how much calcium is in a particular product.  If you read the nutrition label for a food source, it lists the % calcium in that food.  For an 8 oz glass of milk, for example, the label states calcium 30%.  This is equivalent to 300 mg of calcium (multiply the listed number by 10).   **Table from the National [employer]opor[employer] Foundation

## 2020-12-14 NOTE — ASSESSMENT & PLAN NOTE
-- Avoid alcohol and tobacco  -- Encouraged weight bearing exercise  -- RDA of calcium is 1093-7459 mg daily, preferably from food -given list

## 2020-12-14 NOTE — PROGRESS NOTES
I have personally taken the history and examined this patient. I agree with the resident's note as stated above.    patient is ready for LUE ulnar nerve decompression at the elbow and wrist. She was consented for surgery today in clinic. Plan for surgery on 12/18/20.

## 2020-12-14 NOTE — LETTER
December 14, 2020      Miriam Prieto MD  [address]  [facility] [address]           [facility]  [address]  [facility] [address]  Phone: [phone]  Fax: [phone]          Patient: Deborah Navas   MR Number: [id number]   YOB: 1970   Date of Visit: 12/14/2020       Dear Dr. Miriam Prieto:    Thank you for referring Deborah Navas to me for evaluation. Attached you will find relevant portions of my assessment and plan of care.    If you have questions, please do not hesitate to call me. I look forward to following Deborah Navas along with you.    Sincerely,    Becky Castañeda, NP    Enclosure  CC:  No Recipients    If you would like to receive this communication electronically, please contact [email or web address] or [phone] to request more information on [employer] access.    For providers and/or their staff who would like to refer a patient to [facility], please contact us through our one-stop-shop provider referral line, [facility], at [phone].    If you feel you have received this communication in error or would no longer like to receive these types of communications, please e-mail [email or web address]

## 2020-12-14 NOTE — ASSESSMENT & PLAN NOTE
I have reviewed management options including observation, FNA or surgery.  All of the patients questions were answered.  Will proceed with FNA  Discussed indications for repeat FNA as well as surgical indications (abnormal FNA, compressive symptoms or interval change).  Discussed possible results of FNA- Benign, Malignant, FLUS, or insufficient cells.   Patient is not on any blood thinners     If FNA is negative then plan RTO in 1 year with TSH and thyroid u/s prior

## 2020-12-15 DIAGNOSIS — Z94.1 STATUS POST HEART TRANSPLANT: Primary | ICD-10-CM

## 2020-12-16 ENCOUNTER — LAB VISIT (OUTPATIENT)
Dept: OTOLARYNGOLOGY | Facility: CLINIC | Age: 50
End: 2020-12-16
Payer: COMMERCIAL

## 2020-12-16 DIAGNOSIS — Z01.818 PRE-OP TESTING: ICD-10-CM

## 2020-12-16 PROCEDURE — U0003 INFECTIOUS AGENT DETECTION BY NUCLEIC ACID (DNA OR RNA); SEVERE ACUTE RESPIRATORY SYNDROME CORONAVIRUS 2 (SARS-COV-2) (CORONAVIRUS DISEASE [COVID-19]), AMPLIFIED PROBE TECHNIQUE, MAKING USE OF HIGH THROUGHPUT TECHNOLOGIES AS DESCRIBED BY CMS-2020-01-R: HCPCS

## 2020-12-17 ENCOUNTER — TELEPHONE (OUTPATIENT)
Dept: ORTHOPEDICS | Facility: CLINIC | Age: 50
End: 2020-12-17

## 2020-12-17 DIAGNOSIS — Z98.890 POST-OPERATIVE STATE: Primary | ICD-10-CM

## 2020-12-17 LAB — SARS-COV-2 RNA RESP QL NAA+PROBE: NOT DETECTED

## 2020-12-17 RX ORDER — HYDROCODONE BITARTRATE AND ACETAMINOPHEN 5; 325 MG/1; MG/1
1 TABLET ORAL EVERY 6 HOURS PRN
Qty: 20 TABLET | Refills: 0 | Status: SHIPPED | OUTPATIENT
Start: 2020-12-17 | End: 2021-09-24

## 2020-12-17 NOTE — TELEPHONE ENCOUNTER
Informed patient to be at the surgery center 12/18/20 for    8:15a.m. Reminded patient not to eat or drink anything after midnight.Also reminded patient of post op appointment on 01/4/21.Patient verbalized understanding.

## 2020-12-17 NOTE — H&P
DATE: 12/17/2020  PATIENT: Deborah Navas    CHIEF COMPLAINT: L ulnar nerve compression at wrist and elbow    HPI:  Here for surgery.    Past Medical History:   Diagnosis Date    Fractures     History of ventricular fibrillation 9/4/2019    History of ventricular tachycardia 9/4/2019    Hyperlipidemia     Migraine     Multinodular goiter 9/5/2019    Osteoarthritis     Restrictive cardiomyopathy post heart transplant        Past Surgical History:   Procedure Laterality Date    BACK SURGERY      2007    BIOPSY WITH ULTRASOUND GUIDANCE N/A 12/31/2019    Procedure: BIOPSY, WITH US GUIDANCE;  Surgeon: Eric Antunez Jr., MD;  Location: [facility] CATH LAB;  Service: Cardiology;  Laterality: N/A;    BIOPSY WITH ULTRASOUND GUIDANCE N/A 1/7/2020    Procedure: BIOPSY, WITH US GUIDANCE;  Surgeon: Renetta Chaudhari MD;  Location: [facility] CATH LAB;  Service: Cardiology;  Laterality: N/A;    BIOPSY WITH ULTRASOUND GUIDANCE N/A 1/14/2020    Procedure: BIOPSY, WITH US GUIDANCE;  Surgeon: Renetta Chaudhari MD;  Location: [facility] CATH LAB;  Service: Cardiology;  Laterality: N/A;    BIOPSY WITH ULTRASOUND GUIDANCE N/A 1/28/2020    Procedure: BIOPSY, WITH US GUIDANCE;  Surgeon: Jolene Lua MD;  Location: [facility] CATH LAB;  Service: Cardiology;  Laterality: N/A;    BIOPSY WITH ULTRASOUND GUIDANCE N/A 2/11/2020    Procedure: BIOPSY, WITH US GUIDANCE;  Surgeon: Renetta Chaudhari MD;  Location: [facility] CATH LAB;  Service: Cardiology;  Laterality: N/A;    BIOPSY WITH ULTRASOUND GUIDANCE N/A 3/10/2020    Procedure: BIOPSY, WITH US GUIDANCE;  Surgeon: Jolene Lua MD;  Location: [facility] CATH LAB;  Service: Cardiology;  Laterality: N/A;    BONE GRAFT Left     from Left hip to Left FA    DECOMPRESSION OF NERVE Right 10/9/2020    Procedure: DECOMPRESSION, NERVE ulnar right elbow;  Surgeon: Shelly Vasques MD;  Location: [facility] OR;  Service: Orthopedics;  Laterality: Right;  General/Regional    eardrum reconstruction  1980     ELBOW SURGERY Left 9940-2117    FOREARM FRACTURE SURGERY Bilateral 9306-2334    multiple surgeries    HEART TRANSPLANT N/A 12/16/2019    Procedure: TRANSPLANT, HEART;  Surgeon: Talha Nuñez MD;  Location: [facility] OR [address] [place] [address];  Service: Cardiothoracic;  Laterality: N/A;    INSERTION OF GRAFT TO PERICARDIUM  9/10/2019    Procedure: INSERTION, GRAFT, PERICARDIUM;  Surgeon: Shar Walden MD;  Location: [facility] OR [address] [place] [address];  Service: Cardiovascular;;    INSERTION OF IMPLANTABLE CARDIOVERTER-DEFIBRILLATOR (ICD) GENERATOR WITH TWO EXISTING LEADS      INSERTION OF PACEMAKER Left     LEFT HEART CATHETERIZATION Left 12/3/2020    Procedure: Left heart cath;  Surgeon: Daron Bills MD;  Location: [facility] CATH LAB;  Service: Cardiology;  Laterality: Left;    LEFT VENTRICULAR ASSIST DEVICE N/A 9/10/2019    Procedure: INSERTION-LEFT VENTRICULAR ASSIST DEVICE;  Surgeon: Shar Walden MD;  Location: [facility] OR [address] [place] [address];  Service: Cardiovascular;  Laterality: N/A;  DT HM3     LUMBAR FUSION  2007    L4-L5    RIGHT HEART CATHETERIZATION Right 9/4/2019    Procedure: INSERTION, CATHETER, RIGHT HEART;  Surgeon: Vi Bryant MD;  Location: [facility] CATH LAB;  Service: Cardiology;  Laterality: Right;    RIGHT HEART CATHETERIZATION N/A 11/18/2019    Procedure: INSERTION, CATHETER, RIGHT HEART;  Surgeon: Eric Antunez Jr., MD;  Location: [facility] CATH LAB;  Service: Cardiology;  Laterality: N/A;    RIGHT HEART CATHETERIZATION Right 12/31/2019    Procedure: INSERTION, CATHETER, RIGHT HEART;  Surgeon: Eric Antunez Jr., MD;  Location: [facility] CATH LAB;  Service: Cardiology;  Laterality: Right;    RIGHT HEART CATHETERIZATION Right 1/7/2020    Procedure: INSERTION, CATHETER, RIGHT HEART;  Surgeon: Renetta Chaudhari MD;  Location: [facility] CATH LAB;  Service: Cardiology;  Laterality: Right;    RIGHT HEART CATHETERIZATION Right 12/3/2020    Procedure: INSERTION, CATHETER, RIGHT HEART;  Surgeon: Daron Bills MD;  Location: [facility] CATH LAB;   Service: Cardiology;  Laterality: Right;    SINUS SURGERY Right 1994    with lymph nodes    STERNAL WOUND CLOSURE  9/10/2019    Procedure: CLOSURE, WOUND, STERNUM;  Surgeon: Shar Walden MD;  Location: [facility] OR [address] [place] [address];  Service: Cardiovascular;;    TEMPOROMANDIBULAR JOINT SURGERY Right 1988    TONSILLECTOMY      TREATMENT OF CARDIAC ARRHYTHMIA N/A 9/24/2019    Procedure: CARDIOVERSION;  Surgeon: Moe Barrera MD;  Location: [facility] EP LAB;  Service: Cardiology;  Laterality: N/A;  AF, DCCV/NADINE, anes, DM, Rm 3073    TYMPANOSTOMY TUBE PLACEMENT  1971- 1979    multiple tube placements    WOUND EXPLORATION Right 5/27/2020    Procedure: EXPLORATION, WOUND. Lymphocele;  Surgeon: NYDIA Bledsoe II, MD;  Location: [facility] OR [address] [place] [address];  Service: Cardiovascular;  Laterality: Right;       Family History   Problem Relation Age of Onset    Osteoarthritis Mother     Migraines Mother     Osteoarthritis Maternal Grandmother     Migraines Maternal Grandmother     Broken bones Maternal Grandmother     Osteoporosis Maternal Grandmother     Dislocations Maternal Grandmother     Scoliosis Maternal Grandmother     Osteoarthritis Paternal Grandmother     Cancer Paternal Grandmother         leukemia    Migraines Paternal Grandmother     Obesity Paternal Grandmother     Osteoarthritis Paternal Grandfather     Heart failure Paternal Grandfather     Migraines Paternal Grandfather     Heart failure Maternal Grandfather     Migraines Maternal Grandfather     Osteoarthritis Maternal Grandfather     Stroke Father     Osteoarthritis Father        Social History     Socioeconomic History    Marital status: Single     Spouse name: Not on file    Number of children: Not on file    Years of education: Not on file    Highest education level: Not on file   Occupational History    Not on file   Social Needs    Financial resource strain: Not on file    Food insecurity     Worry: Not on file     Inability: Not on file     Transportation needs     Medical: Not on file     Non-medical: Not on file   Tobacco Use    Smoking status: Never Smoker    Smokeless tobacco: Never Used   Substance and Sexual Activity    Alcohol use: No    Drug use: No    Sexual activity: Not Currently   Lifestyle    Physical activity     Days per week: Not on file     Minutes per session: Not on file    Stress: Not on file   Relationships    Social connections     Talks on phone: Not on file     Gets together: Not on file     Attends Taoism service: Not on file     Active member of club or organization: Not on file     Attends meetings of clubs or organizations: Not on file     Relationship status: Not on file   Other Topics Concern    Not on file   Social History Narrative    Not on file       No current facility-administered medications for this encounter.     Current Outpatient Medications:     amLODIPine (NORVASC) 5 MG tablet, Take 1 tablet (5 mg total) by mouth once daily., Disp: 30 tablet, Rfl: 11    amoxicillin (AMOXIL) 500 MG capsule, Take 1 capsule (500 mg) by mouth twice daily., Disp: 30 capsule, Rfl: 1    ascorbic acid, vitamin C, (VITAMIN C) 500 MG tablet, Take 500 mg by mouth 3 (three) times daily., Disp: , Rfl:     aspirin (ECOTRIN) 81 MG EC tablet, Take 1 tablet (81 mg total) by mouth once daily., Disp: 30 tablet, Rfl: 11    atorvastatin (LIPITOR) 20 MG tablet, Take 1 tablet (20 mg total) by mouth once daily. (Patient taking differently: Take 20 mg by mouth every evening. ), Disp: 90 tablet, Rfl: 3    BIOTIN ORAL, Take 500 mg by mouth., Disp: , Rfl:     calcium carbonate-vitamin D3 1,000 mg(2,500 mg)-800 unit Tab, Take 1 tablet by mouth once daily., Disp: 30 tablet, Rfl: 11    ferrous sulfate 325 (65 FE) MG EC tablet, Take 1 tablet (325 mg total) by mouth 3 (three) times daily with meals. And take 4 oz of OJ or at least 250 mg of Vit C with each dose, Disp: 90 tablet, Rfl: 3    gabapentin (NEURONTIN) 300 MG capsule, Take 1  "capsule (300 mg total) by mouth every evening., Disp: 30 capsule, Rfl: 11    HYDROcodone-acetaminophen (NORCO) 5-325 mg per tablet, Take 1 tablet by mouth every 6 (six) hours as needed. (Patient not taking: Reported on 12/14/2020), Disp: 28 tablet, Rfl: 0    magnesium oxide (MAG-OX) 400 mg (241.3 mg magnesium) tablet, Take 1 tablet (400 mg total) by mouth once daily., Disp: 30 tablet, Rfl: 11    multivitamin capsule, Take 1 capsule by mouth once daily., Disp: , Rfl:     mycophenolate (CELLCEPT) 250 mg Cap, Take 4 capsules (1,000 mg total) by mouth 2 (two) times daily., Disp: 240 capsule, Rfl: 11    [facility] transplant care kit, Welcome to [facility].  This is your transplant care kit., Disp: 1 kit, Rfl: 0    pantoprazole (PROTONIX) 40 MG tablet, Take 1 tablet (40 mg total) by mouth once daily., Disp: 30 tablet, Rfl: 11    potassium chloride SA (K-DUR,KLOR-CON) 10 MEQ tablet, Take 500 mEq by mouth once daily., Disp: , Rfl:     predniSONE (DELTASONE) 5 MG tablet, Take 1 tablet (5 mg total) by mouth once daily., Disp: 30 tablet, Rfl: 12    senna-docusate 8.6-50 mg (PERICOLACE) 8.6-50 mg per tablet, Take 2 tablets by mouth 2 (two) times daily as needed for Constipation., Disp: , Rfl:     tacrolimus (PROGRAF) 1 MG Cap, Take 3 capsules (3 mg total) by mouth every 12 (twelve) hours., Disp: 180 capsule, Rfl: 11    venlafaxine (EFFEXOR-XR) 150 MG Cp24, Take 1 capsule (150 mg total) by mouth once daily., Disp: 30 capsule, Rfl: 11    zolpidem (AMBIEN CR) 12.5 MG CR tablet, TAKE 1 TABLET BY MOUTH EVERY DAY AT NIGHT AS NEEDED, Disp: , Rfl:     Facility-Administered Medications Ordered in Other Encounters:     fentaNYL injection 25 mcg, 25 mcg, Intravenous, Q5 Min PRN, Shelly Skinner MD    midazolam (VERSED) 1 mg/mL injection 0.5 mg, 0.5 mg, Intravenous, PRN, Shelly Skinner MD    Review of patient's allergies indicates:   Allergen Reactions    Adhesive Blisters     "Reaction to chest only up to " neck. Denies any problems w/adhesive on any other areas of the body.    Codeine Itching       REVIEW OF SYSTEMS:  Constitutional: negative for fevers  Musculoskeletal: positive for paresthesias    PHYSICAL EXAMINATION:      General: No acute distress.  Cardio: Regular rate.  Chest: No increased work of breathing.       ASSESSMENT/PLAN:    Plan for L UND at wrist and elbow.

## 2020-12-18 ENCOUNTER — HOSPITAL ENCOUNTER (OUTPATIENT)
Facility: HOSPITAL | Age: 50
Discharge: HOME | End: 2020-12-18
Attending: ORTHOPAEDIC SURGERY | Admitting: ORTHOPAEDIC SURGERY
Payer: COMMERCIAL

## 2020-12-18 ENCOUNTER — ANESTHESIA (OUTPATIENT)
Dept: SURGERY | Facility: HOSPITAL | Age: 50
End: 2020-12-18
Payer: COMMERCIAL

## 2020-12-18 VITALS
SYSTOLIC BLOOD PRESSURE: 130 MMHG | HEIGHT: 68 IN | WEIGHT: 207 LBS | BODY MASS INDEX: 31.37 KG/M2 | DIASTOLIC BLOOD PRESSURE: 75 MMHG | HEART RATE: 111 BPM | TEMPERATURE: 97 F | RESPIRATION RATE: 16 BRPM | OXYGEN SATURATION: 92 %

## 2020-12-18 DIAGNOSIS — G56.22 ULNAR NERVE COMPRESSION AT MULTIPLE LEVELS, LEFT: ICD-10-CM

## 2020-12-18 PROCEDURE — 76942 ECHO GUIDE FOR BIOPSY: CPT | Performed by: ANESTHESIOLOGY

## 2020-12-18 PROCEDURE — 99900035 HC TECH TIME PER 15 MIN (STAT)

## 2020-12-18 PROCEDURE — 71000033 HC RECOVERY, INTIAL HOUR: Performed by: ORTHOPAEDIC SURGERY

## 2020-12-18 PROCEDURE — 36000707: Performed by: ORTHOPAEDIC SURGERY

## 2020-12-18 PROCEDURE — 25000003 PHARM REV CODE 250: Performed by: ORTHOPAEDIC SURGERY

## 2020-12-18 PROCEDURE — 64718 REVISE ULNAR NERVE AT ELBOW: CPT | Mod: 79,LT,, | Performed by: ORTHOPAEDIC SURGERY

## 2020-12-18 PROCEDURE — 37000009 HC ANESTHESIA EA ADD 15 MINS: Performed by: ORTHOPAEDIC SURGERY

## 2020-12-18 PROCEDURE — 64415 NJX AA&/STRD BRCH PLXS IMG: CPT | Mod: 59,LT,, | Performed by: ANESTHESIOLOGY

## 2020-12-18 PROCEDURE — 64718 PR REVISE ULNAR NERVE AT ELBOW: ICD-10-PCS | Mod: 79,LT,, | Performed by: ORTHOPAEDIC SURGERY

## 2020-12-18 PROCEDURE — 76942 PR U/S GUIDANCE FOR NEEDLE GUIDANCE: ICD-10-PCS | Mod: 26,,, | Performed by: ANESTHESIOLOGY

## 2020-12-18 PROCEDURE — D9220A PRA ANESTHESIA: Mod: ,,, | Performed by: NURSE ANESTHETIST, CERTIFIED REGISTERED

## 2020-12-18 PROCEDURE — 64415 NJX AA&/STRD BRCH PLXS IMG: CPT | Performed by: ANESTHESIOLOGY

## 2020-12-18 PROCEDURE — 64719 PR REVISE ULNAR NERVE AT WRIST: ICD-10-PCS | Mod: 79,51,LT, | Performed by: ORTHOPAEDIC SURGERY

## 2020-12-18 PROCEDURE — 25000003 PHARM REV CODE 250: Performed by: NURSE ANESTHETIST, CERTIFIED REGISTERED

## 2020-12-18 PROCEDURE — D9220A PRA ANESTHESIA: ICD-10-PCS | Mod: ,,, | Performed by: ANESTHESIOLOGY

## 2020-12-18 PROCEDURE — 94761 N-INVAS EAR/PLS OXIMETRY MLT: CPT

## 2020-12-18 PROCEDURE — 27200651 HC AIRWAY, LMA: Performed by: ANESTHESIOLOGY

## 2020-12-18 PROCEDURE — 25000003 PHARM REV CODE 250: Performed by: ANESTHESIOLOGY

## 2020-12-18 PROCEDURE — 64719 REVISE ULNAR NERVE AT WRIST: CPT | Mod: 79,51,LT, | Performed by: ORTHOPAEDIC SURGERY

## 2020-12-18 PROCEDURE — 76942 ECHO GUIDE FOR BIOPSY: CPT | Mod: 26,,, | Performed by: ANESTHESIOLOGY

## 2020-12-18 PROCEDURE — 71000015 HC POSTOP RECOV 1ST HR: Performed by: ORTHOPAEDIC SURGERY

## 2020-12-18 PROCEDURE — D9220A PRA ANESTHESIA: ICD-10-PCS | Mod: ,,, | Performed by: NURSE ANESTHETIST, CERTIFIED REGISTERED

## 2020-12-18 PROCEDURE — 25000003 PHARM REV CODE 250: Performed by: STUDENT IN AN ORGANIZED HEALTH CARE EDUCATION/TRAINING PROGRAM

## 2020-12-18 PROCEDURE — 64415 PR NERVE BLOCK INJ, ANES/STEROID, BRACHIAL PLEXUS, INCL IMAG GUIDANCE: ICD-10-PCS | Mod: 59,LT,, | Performed by: ANESTHESIOLOGY

## 2020-12-18 PROCEDURE — 36000706: Performed by: ORTHOPAEDIC SURGERY

## 2020-12-18 PROCEDURE — 71000039 HC RECOVERY, EACH ADD'L HOUR: Performed by: ORTHOPAEDIC SURGERY

## 2020-12-18 PROCEDURE — 63600175 PHARM REV CODE 636 W HCPCS: Performed by: STUDENT IN AN ORGANIZED HEALTH CARE EDUCATION/TRAINING PROGRAM

## 2020-12-18 PROCEDURE — 63600175 PHARM REV CODE 636 W HCPCS: Performed by: NURSE ANESTHETIST, CERTIFIED REGISTERED

## 2020-12-18 PROCEDURE — D9220A PRA ANESTHESIA: Mod: ,,, | Performed by: ANESTHESIOLOGY

## 2020-12-18 PROCEDURE — 37000008 HC ANESTHESIA 1ST 15 MINUTES: Performed by: ORTHOPAEDIC SURGERY

## 2020-12-18 DEVICE — MEMBRANE CLARIX 1K 2.5CMX2.5CM: Type: IMPLANTABLE DEVICE | Site: ELBOW | Status: FUNCTIONAL

## 2020-12-18 RX ORDER — HYDROMORPHONE HYDROCHLORIDE 1 MG/ML
0.2 INJECTION, SOLUTION INTRAMUSCULAR; INTRAVENOUS; SUBCUTANEOUS EVERY 5 MIN PRN
Status: DISCONTINUED | OUTPATIENT
Start: 2020-12-18 | End: 2020-12-18 | Stop reason: HOSPADM

## 2020-12-18 RX ORDER — MUPIROCIN 20 MG/G
OINTMENT TOPICAL
Status: DISCONTINUED | OUTPATIENT
Start: 2020-12-18 | End: 2020-12-18 | Stop reason: HOSPADM

## 2020-12-18 RX ORDER — CEFAZOLIN SODIUM 1 G/3ML
2 INJECTION, POWDER, FOR SOLUTION INTRAMUSCULAR; INTRAVENOUS
Status: COMPLETED | OUTPATIENT
Start: 2020-12-18 | End: 2020-12-18

## 2020-12-18 RX ORDER — MIDAZOLAM HYDROCHLORIDE 1 MG/ML
0.5 INJECTION INTRAMUSCULAR; INTRAVENOUS
Status: CANCELLED | OUTPATIENT
Start: 2020-12-18

## 2020-12-18 RX ORDER — LIDOCAINE HCL/PF 100 MG/5ML
SYRINGE (ML) INTRAVENOUS
Status: DISCONTINUED | OUTPATIENT
Start: 2020-12-18 | End: 2020-12-18

## 2020-12-18 RX ORDER — ACETAMINOPHEN 500 MG
1000 TABLET ORAL ONCE
Status: COMPLETED | OUTPATIENT
Start: 2020-12-18 | End: 2020-12-18

## 2020-12-18 RX ORDER — ROPIVACAINE HYDROCHLORIDE 2 MG/ML
5 INJECTION, SOLUTION EPIDURAL; INFILTRATION; PERINEURAL CONTINUOUS
Status: DISCONTINUED | OUTPATIENT
Start: 2020-12-18 | End: 2020-12-18

## 2020-12-18 RX ORDER — FENTANYL CITRATE 50 UG/ML
25 INJECTION, SOLUTION INTRAMUSCULAR; INTRAVENOUS EVERY 5 MIN PRN
Status: CANCELLED | OUTPATIENT
Start: 2020-12-18

## 2020-12-18 RX ORDER — SODIUM CHLORIDE 0.9 % (FLUSH) 0.9 %
10 SYRINGE (ML) INJECTION
Status: DISCONTINUED | OUTPATIENT
Start: 2020-12-18 | End: 2020-12-18 | Stop reason: HOSPADM

## 2020-12-18 RX ORDER — ONDANSETRON HYDROCHLORIDE 2 MG/ML
INJECTION, SOLUTION INTRAMUSCULAR; INTRAVENOUS
Status: DISCONTINUED | OUTPATIENT
Start: 2020-12-18 | End: 2020-12-18

## 2020-12-18 RX ORDER — BACITRACIN ZINC 500 UNIT/G
OINTMENT (GRAM) TOPICAL
Status: DISCONTINUED | OUTPATIENT
Start: 2020-12-18 | End: 2020-12-18 | Stop reason: HOSPADM

## 2020-12-18 RX ORDER — FAMOTIDINE 10 MG/ML
INJECTION INTRAVENOUS
Status: DISCONTINUED | OUTPATIENT
Start: 2020-12-18 | End: 2020-12-18

## 2020-12-18 RX ORDER — ONDANSETRON 2 MG/ML
4 INJECTION INTRAMUSCULAR; INTRAVENOUS DAILY PRN
Status: DISCONTINUED | OUTPATIENT
Start: 2020-12-18 | End: 2020-12-18 | Stop reason: HOSPADM

## 2020-12-18 RX ORDER — CARBOXYMETHYLCELLULOSE SODIUM 10 MG/ML
GEL OPHTHALMIC
Status: DISCONTINUED | OUTPATIENT
Start: 2020-12-18 | End: 2020-12-18

## 2020-12-18 RX ORDER — FENTANYL CITRATE 50 UG/ML
INJECTION, SOLUTION INTRAMUSCULAR; INTRAVENOUS
Status: DISCONTINUED | OUTPATIENT
Start: 2020-12-18 | End: 2020-12-18

## 2020-12-18 RX ORDER — MIDAZOLAM HYDROCHLORIDE 1 MG/ML
INJECTION INTRAMUSCULAR; INTRAVENOUS
Status: DISCONTINUED | OUTPATIENT
Start: 2020-12-18 | End: 2020-12-18

## 2020-12-18 RX ORDER — DEXAMETHASONE SODIUM PHOSPHATE 4 MG/ML
INJECTION, SOLUTION INTRA-ARTICULAR; INTRALESIONAL; INTRAMUSCULAR; INTRAVENOUS; SOFT TISSUE
Status: DISCONTINUED | OUTPATIENT
Start: 2020-12-18 | End: 2020-12-18

## 2020-12-18 RX ORDER — DEXMEDETOMIDINE HYDROCHLORIDE 100 UG/ML
INJECTION, SOLUTION INTRAVENOUS
Status: DISCONTINUED | OUTPATIENT
Start: 2020-12-18 | End: 2020-12-18

## 2020-12-18 RX ORDER — BUPIVACAINE HYDROCHLORIDE 5 MG/ML
INJECTION, SOLUTION EPIDURAL; INTRACAUDAL
Status: DISCONTINUED | OUTPATIENT
Start: 2020-12-18 | End: 2020-12-18

## 2020-12-18 RX ORDER — PROPOFOL 10 MG/ML
VIAL (ML) INTRAVENOUS
Status: DISCONTINUED | OUTPATIENT
Start: 2020-12-18 | End: 2020-12-18

## 2020-12-18 RX ADMIN — ONDANSETRON 8 MG: 2 INJECTION, SOLUTION INTRAMUSCULAR; INTRAVENOUS at 12:12

## 2020-12-18 RX ADMIN — DEXMEDETOMIDINE HYDROCHLORIDE 4 MCG: 100 INJECTION, SOLUTION, CONCENTRATE INTRAVENOUS at 11:12

## 2020-12-18 RX ADMIN — CARBOXYMETHYLCELLULOSE SODIUM 4 DROP: 10 GEL OPHTHALMIC at 10:12

## 2020-12-18 RX ADMIN — BUPIVACAINE HYDROCHLORIDE 20 ML: 5 INJECTION, SOLUTION EPIDURAL; INTRACAUDAL; PERINEURAL at 12:12

## 2020-12-18 RX ADMIN — LIDOCAINE HYDROCHLORIDE 100 MG: 20 INJECTION, SOLUTION INTRAVENOUS at 10:12

## 2020-12-18 RX ADMIN — FENTANYL CITRATE 50 MCG: 50 INJECTION, SOLUTION INTRAMUSCULAR; INTRAVENOUS at 10:12

## 2020-12-18 RX ADMIN — FAMOTIDINE 20 MG: 10 INJECTION, SOLUTION INTRAVENOUS at 10:12

## 2020-12-18 RX ADMIN — ACETAMINOPHEN 1000 MG: 500 TABLET ORAL at 10:12

## 2020-12-18 RX ADMIN — ONDANSETRON 4 MG: 2 INJECTION, SOLUTION INTRAMUSCULAR; INTRAVENOUS at 11:12

## 2020-12-18 RX ADMIN — SODIUM CHLORIDE, SODIUM GLUCONATE, SODIUM ACETATE, POTASSIUM CHLORIDE, MAGNESIUM CHLORIDE, SODIUM PHOSPHATE, DIBASIC, AND POTASSIUM PHOSPHATE: .53; .5; .37; .037; .03; .012; .00082 INJECTION, SOLUTION INTRAVENOUS at 10:12

## 2020-12-18 RX ADMIN — DEXAMETHASONE SODIUM PHOSPHATE 8 MG: 4 INJECTION, SOLUTION INTRAMUSCULAR; INTRAVENOUS at 10:12

## 2020-12-18 RX ADMIN — MIDAZOLAM HYDROCHLORIDE 2 MG: 1 INJECTION, SOLUTION INTRAMUSCULAR; INTRAVENOUS at 10:12

## 2020-12-18 RX ADMIN — PROPOFOL 200 MG: 10 INJECTION, EMULSION INTRAVENOUS at 10:12

## 2020-12-18 RX ADMIN — MUPIROCIN: 20 OINTMENT TOPICAL at 10:12

## 2020-12-18 RX ADMIN — CEFAZOLIN 2 G: 330 INJECTION, POWDER, FOR SOLUTION INTRAMUSCULAR; INTRAVENOUS at 10:12

## 2020-12-18 NOTE — BRIEF OP NOTE
[facility]  Brief Operative Note    Surgery Date: 12/18/2020     Surgeon(s) and Role:     * Shelly Vasques MD - Primary    Assisting Surgeon: None    Pre-op Diagnosis:  Ulnar neuropathy of both upper extremities [G56.23]    Post-op Diagnosis:  Post-Op Diagnosis Codes:     * Ulnar neuropathy of both upper extremities [G56.23]    Procedure(s) (LRB):  DECOMPRESSION, NERVE- LEFT wrist and elbow (Left)    Anesthesia: General    Description of the findings of the procedure(s): see op note             Specimens:   Specimen (12h ago, onward)    None            Discharge Note    OUTCOME: Patient tolerated treatment/procedure well without complication and is now ready for discharge.    DISPOSITION: Home or Self Care    FINAL DIAGNOSIS:  Ulnar nerve compression at multiple levels, left    FOLLOWUP: In clinic    DISCHARGE INSTRUCTIONS:    Discharge Procedure Orders   Ambulatory referral/consult to Physical/Occupational Therapy   Standing Status: Future   Referral Priority: Routine Referral Type: Physical Medicine   Referral Reason: Specialty Services Required   Requested Specialty: Occupational Therapy   Number of Visits Requested: 1     Lifting restrictions     Leave dressing on - Keep it clean, dry, and intact until clinic visit     Notify your health care provider if you experience any of the following:  redness, tenderness, or signs of infection (pain, swelling, redness, odor or green/yellow discharge around incision site)     Notify your health care provider if you experience any of the following:  severe uncontrolled pain     Notify your health care provider if you experience any of the following:  persistent nausea and vomiting or diarrhea     Notify your health care provider if you experience any of the following:  temperature >100.4     Activity as tolerated

## 2020-12-18 NOTE — ANESTHESIA POSTPROCEDURE EVALUATION
Anesthesia Post Evaluation    Patient: Deborah Navas    Procedure(s) Performed: Procedure(s) (LRB):  DECOMPRESSION, NERVE- LEFT wrist and elbow (Left)    Final Anesthesia Type: general      Patient location during evaluation: PACU  Patient participation: Yes- Able to Participate  Level of consciousness: awake and alert  Post-procedure vital signs: reviewed and stable  Pain management: adequate  Airway patency: patent    PONV status at discharge: nausea (controlled)  Anesthetic complications: no      Cardiovascular status: blood pressure returned to baseline  Respiratory status: unassisted, spontaneous ventilation and room air  Hydration status: euvolemic  Follow-up not needed.          Vitals Value Taken Time   /74 12/18/20 1331   Temp 36.5 °C (97.7 °F) 12/18/20 1241   Pulse 110 12/18/20 1335   Resp 12 12/18/20 1335   SpO2 96 % 12/18/20 1335   Vitals shown include unvalidated device data.      No case tracking events are documented in the log.      Pain/Zaria Score: Pain Rating Prior to Med Admin: 0 (12/18/2020 10:07 AM)  Zaria Score: 9 (12/18/2020 12:55 PM)

## 2020-12-18 NOTE — ANESTHESIA PROCEDURE NOTES
Intubation  Performed by: Radha Dodd CRNA  Authorized by: Violeta Bennett MD     Intubation:     Induction:  Intravenous    Intubated:  Postinduction    Mask Ventilation:  Easy mask    Attempts:  1    Attempted By:  CRNA    Difficult Airway Encountered?: No      Complications:  None    Airway Device:  Supraglottic airway/LMA    Airway Device Size:  3.5    Style/Cuff Inflation:  Cuffed    Secured at:  The lips    Placement Verified By:  Capnometry    Complicating Factors:  None    Findings Post-Intubation:  BS equal bilateral

## 2020-12-18 NOTE — ANESTHESIA PROCEDURE NOTES
Supraclavicular single shot    Patient location during procedure: post-op   Block not for primary anesthetic.  Reason for block: at surgeon's request and post-op pain management   Post-op Pain Location: left hand  Start time: 12/18/2020 12:50 PM  Timeout: 12/18/2020 12:50 PM   End time: 12/18/2020 12:55 PM    Staffing  Authorizing Provider: Violeta Bennett MD  Performing Provider: Violeta Bennett MD    Preanesthetic Checklist  Completed: patient identified, site marked, surgical consent, pre-op evaluation, timeout performed, IV checked, risks and benefits discussed and monitors and equipment checked  Peripheral Block  Patient position: supine  Prep: ChloraPrep  Patient monitoring: heart rate, cardiac monitor, continuous pulse ox, continuous capnometry and frequent blood pressure checks  Block type: supraclavicular  Laterality: left  Injection technique: single shot  Needle  Needle type: Stimuplex   Needle gauge: 22 G  Needle length: 2 in  Needle localization: anatomical landmarks and ultrasound guidance   -ultrasound image captured on disc.  Assessment  Injection assessment: negative aspiration, negative parasthesia and local visualized surrounding nerve  Paresthesia pain: none  Heart rate change: no  Slow fractionated injection: yes  Additional Notes  VSS.  [facility] RN monitoring vitals throughout procedure.  Patient tolerated procedure well.  Plus Clonidine and decadron additives

## 2020-12-18 NOTE — TRANSFER OF CARE
"Anesthesia Transfer of Care Note    Patient: Deborah Navas    Procedure(s) Performed: Procedure(s) (LRB):  DECOMPRESSION, NERVE- LEFT wrist and elbow (Left)    Patient location: PACU    Anesthesia Type: general    Transport from OR: Transported from OR on 6-10 L/min O2 by face mask with adequate spontaneous ventilation    Post pain: adequate analgesia    Post assessment: no apparent anesthetic complications    Post vital signs: stable    Level of consciousness: awake    Nausea/Vomiting: nausea (gave an additonal 8mg of Zofran )    Complications: none    Transfer of care protocol was followed      Last vitals:   Visit Vitals  /70   Pulse 97   Temp 36.2 °C (97.1 °F) (Temporal)   Resp 17   Ht 5' 8" (1.727 m)   Wt 93.9 kg (207 lb)   LMP  (LMP Unknown)   SpO2 99%   Breastfeeding No   BMI 31.47 kg/m²     "

## 2020-12-18 NOTE — PLAN OF CARE
Patient is AAO and VSS.  Tolerating PO and denies pain and nausea.  Dressing CDI.  Patient states they are ready for d/c.  IV removed.  Catheter tip intact.  Caregiver at bedside.  Discharge instructions reviewed and copy given to the patient and caregiver.  Questions answered.  Both verbalized understanding.Medication delivered to patient's sister. no DME needed.  Patient wheeled to car by RN.

## 2020-12-18 NOTE — PLAN OF CARE
Or nurses and pt's sister at bedside. Pt states for sister to keep pt's belongings, 1 bag. Pt's sister taking belongings to OR waiting room.

## 2020-12-19 NOTE — OP NOTE
[facility]  Surgery Department  Operative Note    SUMMARY     Date of Procedure: 12/18/2020     Procedure: Procedure(s) (LRB):  DECOMPRESSION, NERVE- LEFT wrist and elbow (Left)     Surgeon(s) and Role:     * Shelly Vasques MD - Primary    Assisting Surgeon: Pepe Smith MD    Pre-Operative Diagnosis: Ulnar neuropathy of both upper extremities [G56.23]    Post-Operative Diagnosis: Post-Op Diagnosis Codes:     * Ulnar neuropathy of both upper extremities [G56.23]    Anesthesia: General    Technical Procedures Used: surgery    Description of the Findings of the Procedure:  Indication for procedure  Ms Es Agustin has failed conservative at the much discussion with patient for surgical intervention risks and patient in clinic signed procedure in detail site patient's participation holding area patient in anesthesia upper extremity was fashion sterile tourniquet well-padded position was placed on left extremity time-out was conducted for the correct procedure to be indicated IV antibiotics given patient preoperatively incision was marked out just posterior the medial epicondyle as well as an incision just ulnar to Castañeda's cardinal lines for the ulnar nerve decompression at the wrist the was exsanguinated Esmarch tourniquet was insufflated 250 mmHg attention was 1st turned to the elbow the incision was made careful dissection down to the ulnar nerve was maintain all the while protecting the medial antebrachial cutaneous nerve nerve was identified it was decompressed proximal to the cubital tunnel through the cubital tunnel and distal including the fascia of the FCU note the patient's ulnar nerve had significant changes where you can see no at the nursing home present could see the individual  Areas irrigated copious amounts normal saline clear X patch was placed into position due to the degenerated nature of this nerve to decrease scarring increase healing Vicryl Monocryl Dermabond closed the  skin our attention was then turned to the wrist incision was made careful dissection down to the ulnar nerve at the wrist this was decompressed proximally to distally into the palm until the sensory and the motor branch were identified this nerve appeared to be in good condition due to the proximity we were also release the carpal tunnel due to because it was present the areas irrigated copious amounts normal saline clear X patch clip was placed into position Vicryl nylon closed the skin sterile dressing was applied patient was placed in the well-padded long-arm posterior splint tolerated suture was brought to cover area in stable condition    Postop plans patient the dressing clean dry and intact will see the patient back in 2 weeks time therapy to be initiated    Significant Surgical Tasks Conducted by the Assistant(s), if Applicable: retraction    Complications: No    Estimated Blood Loss (EBL): * No values recorded between 12/18/2020 10:53 AM and 12/18/2020 12:19 PM *           Implants:   Implant Name Type Inv. Item Serial No.  Lot No. LRB No. Used Action   [id number]  MEMBRANE CLARIX 1K 2.5CMX2.5CM [id number] [employer]  Left 1 Implanted   [id number]  MEMBRANE CLARIX 1K 2.5CMX2.5CM [id number] [employer]  Left 1 Implanted       Specimens:   Specimen (12h ago, onward)    None                  Condition: Good    Disposition: PACU - hemodynamically stable.    Attestation: I performed the procedure.    Discharge Note    SUMMARY     Admit Date: 12/18/2020    Discharge Date and Time: 12/18/2020  2:07 PM    Hospital Course (synopsis of major diagnoses, care, treatment, and services provided during the course of the hospital stay): surgery     Final Diagnosis: Post-Op Diagnosis Codes:     * Ulnar neuropathy of both upper extremities [G56.23]    Disposition: Home or Self Care    Follow Up/Patient Instructions:     Medications:  Reconciled Home Medications:       Medication List      CHANGE how you take these medications    atorvastatin 20 MG tablet  Commonly known as: LIPITOR  Take 1 tablet (20 mg total) by mouth once daily.  What changed: when to take this        CONTINUE taking these medications    amLODIPine 5 MG tablet  Commonly known as: NORVASC  Take 1 tablet (5 mg total) by mouth once daily.     amoxicillin 500 MG capsule  Commonly known as: AMOXIL  Take 1 capsule (500 mg) by mouth twice daily.     aspirin 81 MG EC tablet  Commonly known as: ECOTRIN  Take 1 tablet (81 mg total) by mouth once daily.     BIOTIN ORAL  Take 500 mg by mouth.     calcium carbonate-vitamin D3 1,000 mg(2,500 mg)-800 unit Tab  Take 1 tablet by mouth once daily.     ferrous sulfate 325 (65 FE) MG EC tablet  Take 1 tablet (325 mg total) by mouth 3 (three) times daily with meals. And take 4 oz of OJ or at least 250 mg of Vit C with each dose     gabapentin 300 MG capsule  Commonly known as: NEURONTIN  Take 1 capsule (300 mg total) by mouth every evening.     HYDROcodone-acetaminophen 5-325 mg per tablet  Commonly known as: NORCO  Take 1 tablet by mouth every 6 (six) hours as needed.     magnesium oxide 400 mg (241.3 mg magnesium) tablet  Commonly known as: MAG-OX  Take 1 tablet (400 mg total) by mouth once daily.     multivitamin capsule  Take 1 capsule by mouth once daily.     mycophenolate 250 mg Cap  Commonly known as: CELLCEPT  Take 4 capsules (1,000 mg total) by mouth 2 (two) times daily.     [facility] transplant care kit 1 Kit  Welcome to [facility].  This is your transplant care kit.     pantoprazole 40 MG tablet  Commonly known as: PROTONIX  Take 1 tablet (40 mg total) by mouth once daily.     potassium chloride SA 10 MEQ tablet  Commonly known as: K-DUR,KLOR-CON  Take 500 mEq by mouth once daily.     predniSONE 5 MG tablet  Commonly known as: DELTASONE  Take 1 tablet (5 mg total) by mouth once daily.     senna-docusate 8.6-50 mg 8.6-50 mg per tablet  Commonly known as:  PERICOLACE  Take 2 tablets by mouth 2 (two) times daily as needed for Constipation.     tacrolimus 1 MG Cap  Commonly known as: PROGRAF  Take 3 capsules (3 mg total) by mouth every 12 (twelve) hours.     venlafaxine 150 MG Cp24  Commonly known as: EFFEXOR-XR  Take 1 capsule (150 mg total) by mouth once daily.     VITAMIN C 500 MG tablet  Generic drug: ascorbic acid (vitamin C)  Take 500 mg by mouth 3 (three) times daily.     zolpidem 12.5 MG CR tablet  Commonly known as: AMBIEN CR  TAKE 1 TABLET BY MOUTH EVERY DAY AT NIGHT AS NEEDED          Discharge Procedure Orders   Ambulatory referral/consult to Physical/Occupational Therapy   Standing Status: Future   Referral Priority: Routine Referral Type: Physical Medicine   Referral Reason: Specialty Services Required   Requested Specialty: Occupational Therapy   Number of Visits Requested: 1     Lifting restrictions     Leave dressing on - Keep it clean, dry, and intact until clinic visit     Notify your health care provider if you experience any of the following:  redness, tenderness, or signs of infection (pain, swelling, redness, odor or green/yellow discharge around incision site)     Notify your health care provider if you experience any of the following:  severe uncontrolled pain     Notify your health care provider if you experience any of the following:  persistent nausea and vomiting or diarrhea     Notify your health care provider if you experience any of the following:  temperature >100.4     Activity as tolerated

## 2020-12-31 ENCOUNTER — DOCUMENTATION ONLY (OUTPATIENT)
Dept: REHABILITATION | Facility: HOSPITAL | Age: 50
End: 2020-12-31

## 2020-12-31 PROBLEM — M25.631 DECREASED RANGE OF MOTION OF RIGHT WRIST: Status: RESOLVED | Noted: 2020-10-23 | Resolved: 2020-12-31

## 2020-12-31 PROBLEM — R29.898 DECREASED GRIP STRENGTH OF RIGHT HAND: Status: RESOLVED | Noted: 2020-10-23 | Resolved: 2020-12-31

## 2020-12-31 NOTE — PROGRESS NOTES
Outpatient Therapy Discharge Summary     Name: Deborah Oliva [facility] Number: [id number]    Therapy Diagnosis:        Encounter Diagnoses   Name Primary?    Decreased range of motion of right wrist      Decreased  strength of right hand      Decreased activities of daily living (ADL)        Physician: Ana Kay PA    Physician Orders: OT eval and tx  Medical Diagnosis: cubital tunnel syndrome, right; thenar muscle atrophy of left hand; thenar muscle atrophy of right hand  Evaluation Date: 10/23/2020    Date of Last visit: 11/18/2020  Total Visits Received: 4  Cancelled Visits: 0  No Show Visits: 0    Assessment      * Unable to formally assess remaining goals as pt did not return to therapy    Goals:   Short Term Goals: (4 weeks)  1. Pt will be independent with HEP in 2 visits. - MET  2. Pt will be independent with ulnar nerve glides. - MET  3. Pt will increase wrist AROM by 10 degrees to enable dressing, grooming activities. - MET  4. Pt will tolerate light strengthening exercises for R hand.     Long Term Goals: (12 weeks)  1. Pt will be able to cross R digits to enable stability and coordination of R hand.  2. Pt will exhibit 60-70 degrees of wrist flexion/extension to enable independence with self-care and meal preparation. - MET  3. Pt will exhibit 40-50# R  strength to allow a firm grasp on cooking utensils, steering wheel, etc.  4. Pt will exhibit 5-9# of functional pinch strength to allow writing, opening containers, and turning keys.  5. Pt will exhibit a decrease in FOTO limitation score of <60% which would indicate an improvement in quality of life.    Discharge reason: Patient has not attended therapy since 11/18/2020; patient also recently had left cubital tunnel release and scheduled to return to OT for that    Plan   This patient is discharged from Occupational Therapy

## 2021-01-04 ENCOUNTER — OFFICE VISIT (OUTPATIENT)
Dept: ORTHOPEDICS | Facility: CLINIC | Age: 51
End: 2021-01-04
Payer: COMMERCIAL

## 2021-01-04 VITALS
SYSTOLIC BLOOD PRESSURE: 124 MMHG | HEIGHT: 68 IN | WEIGHT: 207 LBS | BODY MASS INDEX: 31.37 KG/M2 | DIASTOLIC BLOOD PRESSURE: 86 MMHG | HEART RATE: 116 BPM

## 2021-01-04 DIAGNOSIS — Z98.890 POST-OPERATIVE STATE: Primary | ICD-10-CM

## 2021-01-04 PROCEDURE — 1125F PR PAIN SEVERITY QUANTIFIED, PAIN PRESENT: ICD-10-PCS | Mod: S$GLB,,, | Performed by: PHYSICIAN ASSISTANT

## 2021-01-04 PROCEDURE — 3008F BODY MASS INDEX DOCD: CPT | Mod: CPTII,S$GLB,, | Performed by: PHYSICIAN ASSISTANT

## 2021-01-04 PROCEDURE — 99024 PR POST-OP FOLLOW-UP VISIT: ICD-10-PCS | Mod: S$GLB,,, | Performed by: PHYSICIAN ASSISTANT

## 2021-01-04 PROCEDURE — 99024 POSTOP FOLLOW-UP VISIT: CPT | Mod: S$GLB,,, | Performed by: PHYSICIAN ASSISTANT

## 2021-01-04 PROCEDURE — 1125F AMNT PAIN NOTED PAIN PRSNT: CPT | Mod: S$GLB,,, | Performed by: PHYSICIAN ASSISTANT

## 2021-01-04 PROCEDURE — 3008F PR BODY MASS INDEX (BMI) DOCUMENTED: ICD-10-PCS | Mod: CPTII,S$GLB,, | Performed by: PHYSICIAN ASSISTANT

## 2021-01-04 PROCEDURE — 99999 PR PBB SHADOW E&M-EST. PATIENT-LVL IV: ICD-10-PCS | Mod: PBBFAC,,, | Performed by: PHYSICIAN ASSISTANT

## 2021-01-04 PROCEDURE — 99999 PR PBB SHADOW E&M-EST. PATIENT-LVL IV: CPT | Mod: PBBFAC,,, | Performed by: PHYSICIAN ASSISTANT

## 2021-01-05 ENCOUNTER — HOSPITAL ENCOUNTER (OUTPATIENT)
Dept: ENDOCRINOLOGY | Facility: CLINIC | Age: 51
Discharge: HOME | End: 2021-01-05
Attending: NURSE PRACTITIONER
Payer: COMMERCIAL

## 2021-01-05 DIAGNOSIS — E04.1 THYROID NODULE: ICD-10-CM

## 2021-01-05 PROCEDURE — 88173 PR  INTERPRETATION OF FNA SMEAR: ICD-10-PCS | Mod: 26,,, | Performed by: PATHOLOGY

## 2021-01-05 PROCEDURE — 88173 CYTOPATH EVAL FNA REPORT: CPT | Mod: 26,,, | Performed by: PATHOLOGY

## 2021-01-05 PROCEDURE — 88173 CYTOPATH EVAL FNA REPORT: CPT | Performed by: PATHOLOGY

## 2021-01-05 PROCEDURE — 10005 FNA BX W/US GDN 1ST LES: CPT | Mod: S$GLB,,, | Performed by: INTERNAL MEDICINE

## 2021-01-05 PROCEDURE — 10005 US FINE NEEDLE ASPIRATION THYROID, FIRST LESION: ICD-10-PCS | Mod: S$GLB,,, | Performed by: INTERNAL MEDICINE

## 2021-01-07 ENCOUNTER — PATIENT MESSAGE (OUTPATIENT)
Dept: ENDOCRINOLOGY | Facility: CLINIC | Age: 51
End: 2021-01-07

## 2021-01-07 LAB — FINAL PATHOLOGIC DIAGNOSIS: NORMAL

## 2021-01-08 ENCOUNTER — CLINICAL SUPPORT (OUTPATIENT)
Dept: REHABILITATION | Facility: HOSPITAL | Age: 51
End: 2021-01-08
Payer: COMMERCIAL

## 2021-01-08 ENCOUNTER — PATIENT MESSAGE (OUTPATIENT)
Dept: TRANSPLANT | Facility: CLINIC | Age: 51
End: 2021-01-08

## 2021-01-08 DIAGNOSIS — G56.22 ULNAR NERVE COMPRESSION AT MULTIPLE LEVELS, LEFT: ICD-10-CM

## 2021-01-08 DIAGNOSIS — R29.898 DECREASED GRIP STRENGTH OF LEFT HAND: ICD-10-CM

## 2021-01-08 DIAGNOSIS — M25.632 DECREASED RANGE OF MOTION OF LEFT WRIST: ICD-10-CM

## 2021-01-08 PROBLEM — M25.639 DECREASED ROM OF WRIST: Status: ACTIVE | Noted: 2021-01-08

## 2021-01-08 PROCEDURE — 97165 OT EVAL LOW COMPLEX 30 MIN: CPT

## 2021-01-08 PROCEDURE — 97110 THERAPEUTIC EXERCISES: CPT

## 2021-01-19 ENCOUNTER — PATIENT MESSAGE (OUTPATIENT)
Dept: TRANSPLANT | Facility: CLINIC | Age: 51
End: 2021-01-19

## 2021-01-19 RX ORDER — ATORVASTATIN CALCIUM 20 MG/1
20 TABLET, FILM COATED ORAL NIGHTLY
Qty: 90 TABLET | Refills: 3 | Status: SHIPPED | OUTPATIENT
Start: 2021-01-19 | End: 2022-02-09

## 2021-01-19 RX ORDER — LANOLIN ALCOHOL/MO/W.PET/CERES
400 CREAM (GRAM) TOPICAL DAILY
Qty: 30 TABLET | Refills: 11 | Status: SHIPPED | OUTPATIENT
Start: 2021-01-19

## 2021-01-19 RX ORDER — PSYLLIUM HUSK 0.4 G
1 CAPSULE ORAL DAILY
Qty: 30 TABLET | Refills: 11 | Status: SHIPPED | OUTPATIENT
Start: 2021-01-19

## 2021-01-19 RX ORDER — GABAPENTIN 300 MG/1
300 CAPSULE ORAL NIGHTLY
Qty: 30 CAPSULE | Refills: 11 | Status: SHIPPED | OUTPATIENT
Start: 2021-01-19 | End: 2022-02-09

## 2021-01-19 RX ORDER — AMLODIPINE BESYLATE 5 MG/1
5 TABLET ORAL DAILY
Qty: 30 TABLET | Refills: 11 | Status: SHIPPED | OUTPATIENT
Start: 2021-01-19 | End: 2021-07-23

## 2021-01-19 RX ORDER — PANTOPRAZOLE SODIUM 40 MG/1
40 TABLET, DELAYED RELEASE ORAL DAILY
Qty: 30 TABLET | Refills: 11 | Status: SHIPPED | OUTPATIENT
Start: 2021-01-19 | End: 2021-11-26

## 2021-02-01 ENCOUNTER — DOCUMENTATION ONLY (OUTPATIENT)
Dept: ORTHOPEDICS | Facility: CLINIC | Age: 51
End: 2021-02-01

## 2021-02-08 ENCOUNTER — PATIENT MESSAGE (OUTPATIENT)
Dept: TRANSPLANT | Facility: CLINIC | Age: 51
End: 2021-02-08

## 2021-02-09 ENCOUNTER — TELEPHONE (OUTPATIENT)
Dept: TRANSPLANT | Facility: CLINIC | Age: 51
End: 2021-02-09

## 2021-02-09 ENCOUNTER — PATIENT MESSAGE (OUTPATIENT)
Dept: TRANSPLANT | Facility: CLINIC | Age: 51
End: 2021-02-09

## 2021-02-10 ENCOUNTER — CLINICAL SUPPORT (OUTPATIENT)
Dept: REHABILITATION | Facility: HOSPITAL | Age: 51
End: 2021-02-10
Payer: COMMERCIAL

## 2021-02-10 DIAGNOSIS — M25.632 DECREASED RANGE OF MOTION OF LEFT WRIST: ICD-10-CM

## 2021-02-10 DIAGNOSIS — R29.898 DECREASED GRIP STRENGTH OF LEFT HAND: ICD-10-CM

## 2021-02-10 PROCEDURE — 97110 THERAPEUTIC EXERCISES: CPT

## 2021-02-24 ENCOUNTER — CLINICAL SUPPORT (OUTPATIENT)
Dept: REHABILITATION | Facility: HOSPITAL | Age: 51
End: 2021-02-24
Payer: COMMERCIAL

## 2021-02-24 DIAGNOSIS — M25.632 DECREASED RANGE OF MOTION OF LEFT WRIST: ICD-10-CM

## 2021-02-24 DIAGNOSIS — R29.898 DECREASED GRIP STRENGTH OF LEFT HAND: ICD-10-CM

## 2021-02-24 PROCEDURE — 97110 THERAPEUTIC EXERCISES: CPT

## 2021-03-10 ENCOUNTER — CLINICAL SUPPORT (OUTPATIENT)
Dept: REHABILITATION | Facility: HOSPITAL | Age: 51
End: 2021-03-10
Payer: COMMERCIAL

## 2021-03-10 DIAGNOSIS — R29.898 DECREASED GRIP STRENGTH OF LEFT HAND: ICD-10-CM

## 2021-03-10 DIAGNOSIS — M25.632 DECREASED RANGE OF MOTION OF LEFT WRIST: ICD-10-CM

## 2021-03-10 PROCEDURE — 97110 THERAPEUTIC EXERCISES: CPT

## 2021-03-19 ENCOUNTER — TELEPHONE (OUTPATIENT)
Dept: RESEARCH | Facility: HOSPITAL | Age: 51
End: 2021-03-19

## 2021-03-22 ENCOUNTER — RESEARCH ENCOUNTER (OUTPATIENT)
Dept: RESEARCH | Facility: HOSPITAL | Age: 51
End: 2021-03-22

## 2021-03-22 ENCOUNTER — OFFICE VISIT (OUTPATIENT)
Dept: TRANSPLANT | Facility: CLINIC | Age: 51
End: 2021-03-22
Payer: COMMERCIAL

## 2021-03-22 ENCOUNTER — HOSPITAL ENCOUNTER (OUTPATIENT)
Dept: RADIOLOGY | Facility: HOSPITAL | Age: 51
Discharge: HOME | End: 2021-03-22
Attending: INTERNAL MEDICINE
Payer: COMMERCIAL

## 2021-03-22 ENCOUNTER — HOSPITAL ENCOUNTER (OUTPATIENT)
Dept: CARDIOLOGY | Facility: HOSPITAL | Age: 51
Discharge: HOME | End: 2021-03-22
Attending: INTERNAL MEDICINE
Payer: COMMERCIAL

## 2021-03-22 ENCOUNTER — TELEPHONE (OUTPATIENT)
Dept: TRANSPLANT | Facility: CLINIC | Age: 51
End: 2021-03-22

## 2021-03-22 VITALS
HEIGHT: 68 IN | WEIGHT: 215.38 LBS | SYSTOLIC BLOOD PRESSURE: 140 MMHG | DIASTOLIC BLOOD PRESSURE: 89 MMHG | BODY MASS INDEX: 31.37 KG/M2 | BODY MASS INDEX: 32.64 KG/M2 | WEIGHT: 207 LBS | HEIGHT: 68 IN | SYSTOLIC BLOOD PRESSURE: 139 MMHG | HEART RATE: 109 BPM | HEART RATE: 112 BPM | DIASTOLIC BLOOD PRESSURE: 90 MMHG

## 2021-03-22 DIAGNOSIS — I10 ESSENTIAL HYPERTENSION: ICD-10-CM

## 2021-03-22 DIAGNOSIS — Z94.1 S/P ORTHOTOPIC HEART TRANSPLANT: Primary | ICD-10-CM

## 2021-03-22 DIAGNOSIS — Z79.60 LONG-TERM USE OF IMMUNOSUPPRESSANT MEDICATION: ICD-10-CM

## 2021-03-22 DIAGNOSIS — Z94.1 STATUS POST HEART TRANSPLANT: ICD-10-CM

## 2021-03-22 LAB
ASCENDING AORTA: 2.79 CM
AV INDEX (PROSTH): 0.54
AV MEAN GRADIENT: 4 MMHG
AV PEAK GRADIENT: 8 MMHG
AV VALVE AREA: 1.78 CM2
AV VELOCITY RATIO: 0.69
BSA FOR ECHO PROCEDURE: 2.12 M2
CV ECHO LV RWT: 0.45 CM
DOP CALC AO PEAK VEL: 1.38 M/S
DOP CALC AO VTI: 28.56 CM
DOP CALC LVOT AREA: 3.3 CM2
DOP CALC LVOT DIAMETER: 2.05 CM
DOP CALC LVOT PEAK VEL: 0.95 M/S
DOP CALC LVOT STROKE VOLUME: 50.77 CM3
DOP CALCLVOT PEAK VEL VTI: 15.39 CM
E WAVE DECELERATION TIME: 183.77 MSEC
E/A RATIO: 1.16
E/E' RATIO: 6.78 M/S
ECHO LV POSTERIOR WALL: 0.91 CM (ref 0.6–1.1)
FRACTIONAL SHORTENING: 32 % (ref 28–44)
INTERVENTRICULAR SEPTUM: 0.84 CM (ref 0.6–1.1)
LEFT INTERNAL DIMENSION IN SYSTOLE: 2.77 CM (ref 2.1–4)
LEFT VENTRICLE DIASTOLIC VOLUME INDEX: 35.16 ML/M2
LEFT VENTRICLE DIASTOLIC VOLUME: 72.79 ML
LEFT VENTRICLE MASS INDEX: 52 G/M2
LEFT VENTRICLE SYSTOLIC VOLUME INDEX: 13.9 ML/M2
LEFT VENTRICLE SYSTOLIC VOLUME: 28.78 ML
LEFT VENTRICULAR INTERNAL DIMENSION IN DIASTOLE: 4.07 CM (ref 3.5–6)
LEFT VENTRICULAR MASS: 108.53 G
LV LATERAL E/E' RATIO: 6 M/S
LV SEPTAL E/E' RATIO: 7.8 M/S
MV A" WAVE DURATION": 10.66 MSEC
MV PEAK A VEL: 0.67 M/S
MV PEAK E VEL: 0.78 M/S
MV STENOSIS PRESSURE HALF TIME: 53.29 MS
MV VALVE AREA P 1/2 METHOD: 4.13 CM2
PISA TR MAX VEL: 2.49 M/S
PULM VEIN S/D RATIO: 2.46
PV PEAK D VEL: 0.28 M/S
PV PEAK S VEL: 0.69 M/S
RA PRESSURE: 3 MMHG
RIGHT VENTRICULAR END-DIASTOLIC DIMENSION: 3.62 CM
SINUS: 2.69 CM
STJ: 2.27 CM
TDI LATERAL: 0.13 M/S
TDI SEPTAL: 0.1 M/S
TDI: 0.12 M/S
TR MAX PG: 25 MMHG
TRICUSPID ANNULAR PLANE SYSTOLIC EXCURSION: 1.03 CM
TV REST PULMONARY ARTERY PRESSURE: 28 MMHG

## 2021-03-22 PROCEDURE — 3075F SYST BP GE 130 - 139MM HG: CPT | Mod: CPTII,S$GLB,, | Performed by: INTERNAL MEDICINE

## 2021-03-22 PROCEDURE — 1126F PR PAIN SEVERITY QUANTIFIED, NO PAIN PRESENT: ICD-10-PCS | Mod: S$GLB,,, | Performed by: INTERNAL MEDICINE

## 2021-03-22 PROCEDURE — 93306 TTE W/DOPPLER COMPLETE: CPT

## 2021-03-22 PROCEDURE — 99214 OFFICE O/P EST MOD 30 MIN: CPT | Mod: S$GLB,,, | Performed by: INTERNAL MEDICINE

## 2021-03-22 PROCEDURE — 71046 XR CHEST PA AND LATERAL: ICD-10-PCS | Mod: 26,,, | Performed by: RADIOLOGY

## 2021-03-22 PROCEDURE — 3075F PR MOST RECENT SYSTOLIC BLOOD PRESS GE 130-139MM HG: ICD-10-PCS | Mod: CPTII,S$GLB,, | Performed by: INTERNAL MEDICINE

## 2021-03-22 PROCEDURE — 3079F DIAST BP 80-89 MM HG: CPT | Mod: CPTII,S$GLB,, | Performed by: INTERNAL MEDICINE

## 2021-03-22 PROCEDURE — 1126F AMNT PAIN NOTED NONE PRSNT: CPT | Mod: S$GLB,,, | Performed by: INTERNAL MEDICINE

## 2021-03-22 PROCEDURE — 99999 PR PBB SHADOW E&M-EST. PATIENT-LVL IV: CPT | Mod: PBBFAC,,, | Performed by: INTERNAL MEDICINE

## 2021-03-22 PROCEDURE — 3008F BODY MASS INDEX DOCD: CPT | Mod: CPTII,S$GLB,, | Performed by: INTERNAL MEDICINE

## 2021-03-22 PROCEDURE — 3008F PR BODY MASS INDEX (BMI) DOCUMENTED: ICD-10-PCS | Mod: CPTII,S$GLB,, | Performed by: INTERNAL MEDICINE

## 2021-03-22 PROCEDURE — 99999 PR PBB SHADOW E&M-EST. PATIENT-LVL IV: ICD-10-PCS | Mod: PBBFAC,,, | Performed by: INTERNAL MEDICINE

## 2021-03-22 PROCEDURE — 3079F PR MOST RECENT DIASTOLIC BLOOD PRESSURE 80-89 MM HG: ICD-10-PCS | Mod: CPTII,S$GLB,, | Performed by: INTERNAL MEDICINE

## 2021-03-22 PROCEDURE — 71046 X-RAY EXAM CHEST 2 VIEWS: CPT | Mod: 26,,, | Performed by: RADIOLOGY

## 2021-03-22 PROCEDURE — 93306 TTE W/DOPPLER COMPLETE: CPT | Mod: 26,,, | Performed by: INTERNAL MEDICINE

## 2021-03-22 PROCEDURE — 93306 ECHO (CUPID ONLY): ICD-10-PCS | Mod: 26,,, | Performed by: INTERNAL MEDICINE

## 2021-03-22 PROCEDURE — 71046 X-RAY EXAM CHEST 2 VIEWS: CPT | Mod: TC,FY

## 2021-03-22 PROCEDURE — 99214 PR OFFICE/OUTPT VISIT, EST, LEVL IV, 30-39 MIN: ICD-10-PCS | Mod: S$GLB,,, | Performed by: INTERNAL MEDICINE

## 2021-03-23 ENCOUNTER — TELEPHONE (OUTPATIENT)
Dept: TRANSPLANT | Facility: CLINIC | Age: 51
End: 2021-03-23

## 2021-03-23 DIAGNOSIS — Z01.818 PRE-OP TESTING: Primary | ICD-10-CM

## 2021-03-23 DIAGNOSIS — Z94.1 STATUS POST HEART TRANSPLANT: ICD-10-CM

## 2021-03-23 DIAGNOSIS — T86.20 COMPLICATION OF HEART TRANSPLANT, UNSPECIFIED COMPLICATION: ICD-10-CM

## 2021-03-25 ENCOUNTER — PATIENT MESSAGE (OUTPATIENT)
Dept: TRANSPLANT | Facility: CLINIC | Age: 51
End: 2021-03-25

## 2021-03-28 ENCOUNTER — LAB VISIT (OUTPATIENT)
Dept: URGENT CARE | Facility: CLINIC | Age: 51
End: 2021-03-28
Payer: COMMERCIAL

## 2021-03-28 DIAGNOSIS — Z01.818 PRE-OP TESTING: ICD-10-CM

## 2021-03-28 PROCEDURE — U0005 INFEC AGEN DETEC AMPLI PROBE: HCPCS | Performed by: INTERNAL MEDICINE

## 2021-03-28 PROCEDURE — U0003 INFECTIOUS AGENT DETECTION BY NUCLEIC ACID (DNA OR RNA); SEVERE ACUTE RESPIRATORY SYNDROME CORONAVIRUS 2 (SARS-COV-2) (CORONAVIRUS DISEASE [COVID-19]), AMPLIFIED PROBE TECHNIQUE, MAKING USE OF HIGH THROUGHPUT TECHNOLOGIES AS DESCRIBED BY CMS-2020-01-R: HCPCS | Performed by: INTERNAL MEDICINE

## 2021-03-29 LAB — SARS-COV-2 RNA RESP QL NAA+PROBE: NOT DETECTED

## 2021-03-30 ENCOUNTER — HOSPITAL ENCOUNTER (OUTPATIENT)
Facility: HOSPITAL | Age: 51
Discharge: HOME | End: 2021-03-30
Attending: INTERNAL MEDICINE | Admitting: INTERNAL MEDICINE
Payer: COMMERCIAL

## 2021-03-30 VITALS
DIASTOLIC BLOOD PRESSURE: 82 MMHG | TEMPERATURE: 98 F | BODY MASS INDEX: 33.04 KG/M2 | OXYGEN SATURATION: 100 % | HEIGHT: 68 IN | HEART RATE: 107 BPM | RESPIRATION RATE: 16 BRPM | WEIGHT: 218 LBS | SYSTOLIC BLOOD PRESSURE: 129 MMHG

## 2021-03-30 DIAGNOSIS — Z94.1 S/P ORTHOTOPIC HEART TRANSPLANT: ICD-10-CM

## 2021-03-30 DIAGNOSIS — Z94.1 STATUS POST TRANSPLANT, HEART: ICD-10-CM

## 2021-03-30 DIAGNOSIS — Z94.1 S/P ORTHOTOPIC HEART TRANSPLANT: Primary | ICD-10-CM

## 2021-03-30 LAB
BSA FOR ECHO PROCEDURE: 2.18 M2
PISA TR MAX VEL: 2.07 M/S
TR MAX PG: 17 MMHG

## 2021-03-30 PROCEDURE — 93505 ENDOMYOCARDIAL BIOPSY: CPT | Performed by: INTERNAL MEDICINE

## 2021-03-30 PROCEDURE — 88346 PR IMMUNOFLUORESCENT ANTB, 1ST STAIN: ICD-10-PCS | Mod: 26,,, | Performed by: PATHOLOGY

## 2021-03-30 PROCEDURE — 88307 TISSUE EXAM BY PATHOLOGIST: CPT | Mod: 26,,, | Performed by: PATHOLOGY

## 2021-03-30 PROCEDURE — 88307 PR  SURG PATH,LEVEL V: ICD-10-PCS | Mod: 26,,, | Performed by: PATHOLOGY

## 2021-03-30 PROCEDURE — 25000003 PHARM REV CODE 250: Performed by: INTERNAL MEDICINE

## 2021-03-30 PROCEDURE — 88346 IMFLUOR 1ST 1ANTB STAIN PX: CPT | Mod: 26,,, | Performed by: PATHOLOGY

## 2021-03-30 PROCEDURE — 88342 CHG IMMUNOCYTOCHEMISTRY: ICD-10-PCS | Mod: 26,,, | Performed by: PATHOLOGY

## 2021-03-30 PROCEDURE — 88342 IMHCHEM/IMCYTCHM 1ST ANTB: CPT | Mod: 26,,, | Performed by: PATHOLOGY

## 2021-03-30 PROCEDURE — 93505 PR BIOPSY OF HEART LINING: ICD-10-PCS | Mod: 26,,, | Performed by: INTERNAL MEDICINE

## 2021-03-30 PROCEDURE — 93505 ENDOMYOCARDIAL BIOPSY: CPT | Mod: 26,,, | Performed by: INTERNAL MEDICINE

## 2021-03-30 PROCEDURE — 27201423 OPTIME MED/SURG SUP & DEVICES STERILE SUPPLY: Performed by: INTERNAL MEDICINE

## 2021-03-30 PROCEDURE — 88346 IMFLUOR 1ST 1ANTB STAIN PX: CPT | Performed by: PATHOLOGY

## 2021-03-30 PROCEDURE — 88342 IMHCHEM/IMCYTCHM 1ST ANTB: CPT | Performed by: PATHOLOGY

## 2021-03-30 PROCEDURE — 88307 TISSUE EXAM BY PATHOLOGIST: CPT | Performed by: PATHOLOGY

## 2021-03-30 PROCEDURE — C1894 INTRO/SHEATH, NON-LASER: HCPCS | Performed by: INTERNAL MEDICINE

## 2021-03-30 RX ORDER — LIDOCAINE HYDROCHLORIDE 20 MG/ML
INJECTION, SOLUTION INFILTRATION; PERINEURAL
Status: DISCONTINUED | OUTPATIENT
Start: 2021-03-30 | End: 2021-03-30 | Stop reason: HOSPADM

## 2021-03-30 RX ORDER — SODIUM CHLORIDE 0.9 G/100ML
IRRIGANT IRRIGATION
Status: DISCONTINUED | OUTPATIENT
Start: 2021-03-30 | End: 2021-03-30 | Stop reason: HOSPADM

## 2021-03-31 ENCOUNTER — TELEPHONE (OUTPATIENT)
Dept: TRANSPLANT | Facility: CLINIC | Age: 51
End: 2021-03-31

## 2021-03-31 LAB
FINAL PATHOLOGIC DIAGNOSIS: NORMAL
GROSS: NORMAL
Lab: NORMAL
MICROSCOPIC EXAM: NORMAL

## 2021-04-06 ENCOUNTER — OFFICE VISIT (OUTPATIENT)
Dept: URGENT CARE | Facility: CLINIC | Age: 51
End: 2021-04-06
Payer: COMMERCIAL

## 2021-04-06 ENCOUNTER — HOSPITAL ENCOUNTER (OUTPATIENT)
Dept: RADIOLOGY | Facility: CLINIC | Age: 51
Discharge: HOME | End: 2021-04-06
Attending: NURSE PRACTITIONER
Payer: COMMERCIAL

## 2021-04-06 VITALS
TEMPERATURE: 97 F | HEART RATE: 122 BPM | SYSTOLIC BLOOD PRESSURE: 129 MMHG | HEIGHT: 68 IN | WEIGHT: 218 LBS | DIASTOLIC BLOOD PRESSURE: 75 MMHG | RESPIRATION RATE: 18 BRPM | OXYGEN SATURATION: 97 % | BODY MASS INDEX: 33.04 KG/M2

## 2021-04-06 DIAGNOSIS — M79.674 GREAT TOE PAIN, RIGHT: ICD-10-CM

## 2021-04-06 DIAGNOSIS — S90.111A CONTUSION OF RIGHT GREAT TOE WITHOUT DAMAGE TO NAIL, INITIAL ENCOUNTER: ICD-10-CM

## 2021-04-06 DIAGNOSIS — S92.424A CLOSED NONDISPLACED FRACTURE OF DISTAL PHALANX OF RIGHT GREAT TOE, INITIAL ENCOUNTER: Primary | ICD-10-CM

## 2021-04-06 PROCEDURE — 3008F BODY MASS INDEX DOCD: CPT | Mod: CPTII,S$GLB,, | Performed by: NURSE PRACTITIONER

## 2021-04-06 PROCEDURE — 73660 XR TOE 2 OR MORE VIEWS RIGHT: ICD-10-PCS | Mod: RT,S$GLB,, | Performed by: RADIOLOGY

## 2021-04-06 PROCEDURE — 73660 X-RAY EXAM OF TOE(S): CPT | Mod: RT,S$GLB,, | Performed by: RADIOLOGY

## 2021-04-06 PROCEDURE — 1125F PR PAIN SEVERITY QUANTIFIED, PAIN PRESENT: ICD-10-PCS | Mod: S$GLB,,, | Performed by: NURSE PRACTITIONER

## 2021-04-06 PROCEDURE — 99214 OFFICE O/P EST MOD 30 MIN: CPT | Mod: S$GLB,,, | Performed by: NURSE PRACTITIONER

## 2021-04-06 PROCEDURE — 99214 PR OFFICE/OUTPT VISIT, EST, LEVL IV, 30-39 MIN: ICD-10-PCS | Mod: S$GLB,,, | Performed by: NURSE PRACTITIONER

## 2021-04-06 PROCEDURE — 3008F PR BODY MASS INDEX (BMI) DOCUMENTED: ICD-10-PCS | Mod: CPTII,S$GLB,, | Performed by: NURSE PRACTITIONER

## 2021-04-06 PROCEDURE — 1125F AMNT PAIN NOTED PAIN PRSNT: CPT | Mod: S$GLB,,, | Performed by: NURSE PRACTITIONER

## 2021-04-07 ENCOUNTER — PATIENT MESSAGE (OUTPATIENT)
Dept: ORTHOPEDICS | Facility: CLINIC | Age: 51
End: 2021-04-07

## 2021-04-07 ENCOUNTER — TELEPHONE (OUTPATIENT)
Dept: URGENT CARE | Facility: CLINIC | Age: 51
End: 2021-04-07

## 2021-04-08 ENCOUNTER — OFFICE VISIT (OUTPATIENT)
Dept: PODIATRY | Facility: CLINIC | Age: 51
End: 2021-04-08
Payer: COMMERCIAL

## 2021-04-08 VITALS
HEART RATE: 121 BPM | DIASTOLIC BLOOD PRESSURE: 88 MMHG | HEIGHT: 68 IN | BODY MASS INDEX: 33.05 KG/M2 | SYSTOLIC BLOOD PRESSURE: 132 MMHG | WEIGHT: 218.06 LBS

## 2021-04-08 DIAGNOSIS — S92.424A CLOSED NONDISPLACED FRACTURE OF DISTAL PHALANX OF RIGHT GREAT TOE, INITIAL ENCOUNTER: Primary | ICD-10-CM

## 2021-04-08 PROCEDURE — 99999 PR PBB SHADOW E&M-EST. PATIENT-LVL IV: ICD-10-PCS | Mod: PBBFAC,,, | Performed by: PODIATRIST

## 2021-04-08 PROCEDURE — 99204 OFFICE O/P NEW MOD 45 MIN: CPT | Mod: S$GLB,,, | Performed by: PODIATRIST

## 2021-04-08 PROCEDURE — 99999 PR PBB SHADOW E&M-EST. PATIENT-LVL IV: CPT | Mod: PBBFAC,,, | Performed by: PODIATRIST

## 2021-04-08 PROCEDURE — 99204 PR OFFICE/OUTPT VISIT, NEW, LEVL IV, 45-59 MIN: ICD-10-PCS | Mod: S$GLB,,, | Performed by: PODIATRIST

## 2021-04-08 PROCEDURE — 3008F BODY MASS INDEX DOCD: CPT | Mod: CPTII,S$GLB,, | Performed by: PODIATRIST

## 2021-04-08 PROCEDURE — 1125F AMNT PAIN NOTED PAIN PRSNT: CPT | Mod: S$GLB,,, | Performed by: PODIATRIST

## 2021-04-08 PROCEDURE — 3008F PR BODY MASS INDEX (BMI) DOCUMENTED: ICD-10-PCS | Mod: CPTII,S$GLB,, | Performed by: PODIATRIST

## 2021-04-08 PROCEDURE — 1125F PR PAIN SEVERITY QUANTIFIED, PAIN PRESENT: ICD-10-PCS | Mod: S$GLB,,, | Performed by: PODIATRIST

## 2021-04-08 RX ORDER — DICLOFENAC SODIUM 10 MG/G
2 GEL TOPICAL 2 TIMES DAILY
Qty: 100 G | Refills: 0 | Status: SHIPPED | OUTPATIENT
Start: 2021-04-08 | End: 2021-06-14

## 2021-04-09 ENCOUNTER — DOCUMENTATION ONLY (OUTPATIENT)
Dept: REHABILITATION | Facility: HOSPITAL | Age: 51
End: 2021-04-09

## 2021-04-09 PROBLEM — M25.639 DECREASED ROM OF WRIST: Status: RESOLVED | Noted: 2021-01-08 | Resolved: 2021-04-09

## 2021-04-09 PROBLEM — R29.898 DECREASED GRIP STRENGTH OF LEFT HAND: Status: RESOLVED | Noted: 2021-01-08 | Resolved: 2021-04-09

## 2021-04-15 ENCOUNTER — PATIENT MESSAGE (OUTPATIENT)
Dept: TRANSPLANT | Facility: CLINIC | Age: 51
End: 2021-04-15

## 2021-04-15 DIAGNOSIS — Z94.1 STATUS POST HEART TRANSPLANT: ICD-10-CM

## 2021-04-15 DIAGNOSIS — Z94.1 HEART REPLACED BY TRANSPLANT: Primary | ICD-10-CM

## 2021-04-26 ENCOUNTER — LAB VISIT (OUTPATIENT)
Dept: LAB | Facility: HOSPITAL | Age: 51
End: 2021-04-26
Attending: INTERNAL MEDICINE
Payer: COMMERCIAL

## 2021-04-26 ENCOUNTER — PATIENT MESSAGE (OUTPATIENT)
Dept: TRANSPLANT | Facility: CLINIC | Age: 51
End: 2021-04-26

## 2021-04-26 DIAGNOSIS — Z94.1 HEART REPLACED BY TRANSPLANT: ICD-10-CM

## 2021-04-26 DIAGNOSIS — Z94.1 STATUS POST HEART TRANSPLANT: ICD-10-CM

## 2021-04-26 LAB
ALBUMIN SERPL BCP-MCNC: 4.1 G/DL (ref 3.5–5.2)
ALP SERPL-CCNC: 81 U/L (ref 55–135)
ALT SERPL W/O P-5'-P-CCNC: 12 U/L (ref 10–44)
ANION GAP SERPL CALC-SCNC: 11 MMOL/L (ref 8–16)
AST SERPL-CCNC: 14 U/L (ref 10–40)
BASOPHILS # BLD AUTO: 0.08 K/UL (ref 0–0.2)
BASOPHILS NFR BLD: 0.9 % (ref 0–1.9)
BILIRUB SERPL-MCNC: 0.4 MG/DL (ref 0.1–1)
BNP SERPL-MCNC: 33 PG/ML (ref 0–99)
BUN SERPL-MCNC: 33 MG/DL (ref 6–20)
CALCIUM SERPL-MCNC: 9.9 MG/DL (ref 8.7–10.5)
CHLORIDE SERPL-SCNC: 101 MMOL/L (ref 95–110)
CO2 SERPL-SCNC: 29 MMOL/L (ref 23–29)
CREAT SERPL-MCNC: 1.5 MG/DL (ref 0.5–1.4)
DIFFERENTIAL METHOD: ABNORMAL
EOSINOPHIL # BLD AUTO: 0.2 K/UL (ref 0–0.5)
EOSINOPHIL NFR BLD: 1.8 % (ref 0–8)
ERYTHROCYTE [DISTWIDTH] IN BLOOD BY AUTOMATED COUNT: 14.7 % (ref 11.5–14.5)
EST. GFR  (AFRICAN AMERICAN): 46.2 ML/MIN/1.73 M^2
EST. GFR  (NON AFRICAN AMERICAN): 40.1 ML/MIN/1.73 M^2
GLUCOSE SERPL-MCNC: 105 MG/DL (ref 70–110)
HCT VFR BLD AUTO: 40.5 % (ref 37–48.5)
HGB BLD-MCNC: 12.7 G/DL (ref 12–16)
IMM GRANULOCYTES # BLD AUTO: 0.06 K/UL (ref 0–0.04)
IMM GRANULOCYTES NFR BLD AUTO: 0.7 % (ref 0–0.5)
LYMPHOCYTES # BLD AUTO: 0.9 K/UL (ref 1–4.8)
LYMPHOCYTES NFR BLD: 10.4 % (ref 18–48)
MCH RBC QN AUTO: 27.1 PG (ref 27–31)
MCHC RBC AUTO-ENTMCNC: 31.4 G/DL (ref 32–36)
MCV RBC AUTO: 87 FL (ref 82–98)
MONOCYTES # BLD AUTO: 0.7 K/UL (ref 0.3–1)
MONOCYTES NFR BLD: 8.1 % (ref 4–15)
NEUTROPHILS # BLD AUTO: 6.8 K/UL (ref 1.8–7.7)
NEUTROPHILS NFR BLD: 78.1 % (ref 38–73)
NRBC BLD-RTO: 0 /100 WBC
PLATELET # BLD AUTO: 394 K/UL (ref 150–450)
PMV BLD AUTO: 9.2 FL (ref 9.2–12.9)
POTASSIUM SERPL-SCNC: 3.8 MMOL/L (ref 3.5–5.1)
PROT SERPL-MCNC: 7 G/DL (ref 6–8.4)
RBC # BLD AUTO: 4.68 M/UL (ref 4–5.4)
SODIUM SERPL-SCNC: 141 MMOL/L (ref 136–145)
TACROLIMUS BLD-MCNC: 11.2 NG/ML (ref 5–15)
WBC # BLD AUTO: 8.72 K/UL (ref 3.9–12.7)

## 2021-04-26 PROCEDURE — 80053 COMPREHEN METABOLIC PANEL: CPT | Performed by: INTERNAL MEDICINE

## 2021-04-26 PROCEDURE — 85025 COMPLETE CBC W/AUTO DIFF WBC: CPT | Performed by: INTERNAL MEDICINE

## 2021-04-26 PROCEDURE — 36415 COLL VENOUS BLD VENIPUNCTURE: CPT | Performed by: INTERNAL MEDICINE

## 2021-04-26 PROCEDURE — 83880 ASSAY OF NATRIURETIC PEPTIDE: CPT | Performed by: INTERNAL MEDICINE

## 2021-04-26 PROCEDURE — 80197 ASSAY OF TACROLIMUS: CPT | Performed by: INTERNAL MEDICINE

## 2021-04-28 LAB — HEART CARE KIT (SHORE): NORMAL

## 2021-05-11 ENCOUNTER — PATIENT MESSAGE (OUTPATIENT)
Dept: TRANSPLANT | Facility: CLINIC | Age: 51
End: 2021-05-11

## 2021-05-24 ENCOUNTER — PATIENT MESSAGE (OUTPATIENT)
Dept: TRANSPLANT | Facility: CLINIC | Age: 51
End: 2021-05-24

## 2021-05-24 DIAGNOSIS — Z94.1 STATUS POST HEART TRANSPLANT: ICD-10-CM

## 2021-05-24 DIAGNOSIS — Z94.1 HEART REPLACED BY TRANSPLANT: ICD-10-CM

## 2021-05-24 RX ORDER — TACROLIMUS 1 MG/1
3 CAPSULE ORAL EVERY 12 HOURS
Qty: 180 CAPSULE | Refills: 11 | Status: SHIPPED | OUTPATIENT
Start: 2021-05-24 | End: 2022-02-09

## 2021-05-24 RX ORDER — MYCOPHENOLATE MOFETIL 250 MG/1
1000 CAPSULE ORAL 2 TIMES DAILY
Qty: 240 CAPSULE | Refills: 11 | Status: SHIPPED | OUTPATIENT
Start: 2021-05-24 | End: 2022-02-09

## 2021-05-24 RX ORDER — PREDNISONE 5 MG/1
5 TABLET ORAL DAILY
Qty: 30 TABLET | Refills: 11 | Status: SHIPPED | OUTPATIENT
Start: 2021-05-24 | End: 2022-02-09

## 2021-06-14 ENCOUNTER — LAB VISIT (OUTPATIENT)
Dept: LAB | Facility: HOSPITAL | Age: 51
End: 2021-06-14
Attending: INTERNAL MEDICINE
Payer: COMMERCIAL

## 2021-06-14 DIAGNOSIS — Z94.1 STATUS POST HEART TRANSPLANT: ICD-10-CM

## 2021-06-14 LAB
ALBUMIN SERPL BCP-MCNC: 4.2 G/DL (ref 3.5–5.2)
ALP SERPL-CCNC: 87 U/L (ref 55–135)
ALT SERPL W/O P-5'-P-CCNC: 12 U/L (ref 10–44)
ANION GAP SERPL CALC-SCNC: 14 MMOL/L (ref 8–16)
AST SERPL-CCNC: 21 U/L (ref 10–40)
BASOPHILS # BLD AUTO: 0.05 K/UL (ref 0–0.2)
BASOPHILS NFR BLD: 0.7 % (ref 0–1.9)
BILIRUB SERPL-MCNC: 0.3 MG/DL (ref 0.1–1)
BUN SERPL-MCNC: 27 MG/DL (ref 6–20)
CALCIUM SERPL-MCNC: 10.5 MG/DL (ref 8.7–10.5)
CHLORIDE SERPL-SCNC: 99 MMOL/L (ref 95–110)
CHOLEST SERPL-MCNC: 146 MG/DL (ref 120–199)
CHOLEST/HDLC SERPL: 2.7 {RATIO} (ref 2–5)
CO2 SERPL-SCNC: 24 MMOL/L (ref 23–29)
CREAT SERPL-MCNC: 1.4 MG/DL (ref 0.5–1.4)
DIFFERENTIAL METHOD: ABNORMAL
EOSINOPHIL # BLD AUTO: 0.1 K/UL (ref 0–0.5)
EOSINOPHIL NFR BLD: 1.9 % (ref 0–8)
ERYTHROCYTE [DISTWIDTH] IN BLOOD BY AUTOMATED COUNT: 13.1 % (ref 11.5–14.5)
EST. GFR  (AFRICAN AMERICAN): 50.2 ML/MIN/1.73 M^2
EST. GFR  (NON AFRICAN AMERICAN): 43.5 ML/MIN/1.73 M^2
GLUCOSE SERPL-MCNC: 105 MG/DL (ref 70–110)
HCT VFR BLD AUTO: 40.9 % (ref 37–48.5)
HDLC SERPL-MCNC: 55 MG/DL (ref 40–75)
HDLC SERPL: 37.7 % (ref 20–50)
HGB BLD-MCNC: 12.7 G/DL (ref 12–16)
IMM GRANULOCYTES # BLD AUTO: 0.04 K/UL (ref 0–0.04)
IMM GRANULOCYTES NFR BLD AUTO: 0.6 % (ref 0–0.5)
LDLC SERPL CALC-MCNC: 73.8 MG/DL (ref 63–159)
LYMPHOCYTES # BLD AUTO: 0.6 K/UL (ref 1–4.8)
LYMPHOCYTES NFR BLD: 9.3 % (ref 18–48)
MAGNESIUM SERPL-MCNC: 1.8 MG/DL (ref 1.6–2.6)
MCH RBC QN AUTO: 28 PG (ref 27–31)
MCHC RBC AUTO-ENTMCNC: 31.1 G/DL (ref 32–36)
MCV RBC AUTO: 90 FL (ref 82–98)
MONOCYTES # BLD AUTO: 0.6 K/UL (ref 0.3–1)
MONOCYTES NFR BLD: 9.4 % (ref 4–15)
NEUTROPHILS # BLD AUTO: 5.3 K/UL (ref 1.8–7.7)
NEUTROPHILS NFR BLD: 78.1 % (ref 38–73)
NONHDLC SERPL-MCNC: 91 MG/DL
NRBC BLD-RTO: 0 /100 WBC
PLATELET # BLD AUTO: 380 K/UL (ref 150–450)
PMV BLD AUTO: 9.4 FL (ref 9.2–12.9)
POTASSIUM SERPL-SCNC: 3.3 MMOL/L (ref 3.5–5.1)
PROT SERPL-MCNC: 7.1 G/DL (ref 6–8.4)
RBC # BLD AUTO: 4.54 M/UL (ref 4–5.4)
SODIUM SERPL-SCNC: 137 MMOL/L (ref 136–145)
TRIGL SERPL-MCNC: 86 MG/DL (ref 30–150)
WBC # BLD AUTO: 6.78 K/UL (ref 3.9–12.7)

## 2021-06-14 PROCEDURE — 85025 COMPLETE CBC W/AUTO DIFF WBC: CPT | Performed by: INTERNAL MEDICINE

## 2021-06-14 PROCEDURE — 80197 ASSAY OF TACROLIMUS: CPT | Performed by: INTERNAL MEDICINE

## 2021-06-14 PROCEDURE — 86832 HLA CLASS I HIGH DEFIN QUAL: CPT | Performed by: INTERNAL MEDICINE

## 2021-06-14 PROCEDURE — 86833 HLA CLASS II HIGH DEFIN QUAL: CPT | Mod: 59 | Performed by: INTERNAL MEDICINE

## 2021-06-14 PROCEDURE — 80053 COMPREHEN METABOLIC PANEL: CPT | Performed by: INTERNAL MEDICINE

## 2021-06-14 PROCEDURE — 86977 RBC SERUM PRETX INCUBJ/INHIB: CPT | Mod: 59 | Performed by: INTERNAL MEDICINE

## 2021-06-14 PROCEDURE — 80061 LIPID PANEL: CPT | Performed by: INTERNAL MEDICINE

## 2021-06-14 PROCEDURE — 36415 COLL VENOUS BLD VENIPUNCTURE: CPT | Performed by: INTERNAL MEDICINE

## 2021-06-14 PROCEDURE — 83735 ASSAY OF MAGNESIUM: CPT | Performed by: INTERNAL MEDICINE

## 2021-06-14 PROCEDURE — 86833 HLA CLASS II HIGH DEFIN QUAL: CPT | Performed by: INTERNAL MEDICINE

## 2021-06-14 PROCEDURE — 86832 HLA CLASS I HIGH DEFIN QUAL: CPT | Mod: 59 | Performed by: INTERNAL MEDICINE

## 2021-06-14 RX ORDER — VIT C/E/ZN/COPPR/LUTEIN/ZEAXAN 250MG-90MG
1000 CAPSULE ORAL DAILY
Qty: 30 CAPSULE | Refills: 11 | Status: CANCELLED
Start: 2021-06-14

## 2021-06-14 RX ORDER — PLANT STANOL ESTER 450 MG
550 TABLET ORAL DAILY
Qty: 30 TABLET | Refills: 11 | Status: CANCELLED
Start: 2021-06-14

## 2021-06-15 LAB
CLASS I ANTIBODY COMMENTS - LUMINEX: NORMAL
CLASS II ANTIBODY COMMENTS - LUMINEX: NORMAL
DSA1 TESTING DATE: NORMAL
DSA12 TESTING DATE: NORMAL
DSA2 TESTING DATE: NORMAL
SERUM COLLECTION DT - LUMINEX CLASS I: NORMAL
SERUM COLLECTION DT - LUMINEX CLASS II: NORMAL
TACROLIMUS BLD-MCNC: 10.9 NG/ML (ref 5–15)
TACROLIMUS, NORMALIZED: 9.5 NG/ML (ref 5–15)

## 2021-06-17 ENCOUNTER — TELEPHONE (OUTPATIENT)
Dept: TRANSPLANT | Facility: CLINIC | Age: 51
End: 2021-06-17

## 2021-07-06 NOTE — PLAN OF CARE
Pt free from falls and injury during shift. Amio gtt decreased to 0.5mg. Not shocks from defib during shift. RHC planned Monday. INR 2.3. VAD wnl. VSS, pt voiced no complaints. POC updated with pt, will continue to monitor.   Intubated/3 = unable to speak (not related to language barrier)

## 2021-07-08 ENCOUNTER — PATIENT MESSAGE (OUTPATIENT)
Dept: TRANSPLANT | Facility: CLINIC | Age: 51
End: 2021-07-08

## 2021-07-13 ENCOUNTER — TELEPHONE (OUTPATIENT)
Dept: TRANSPLANT | Facility: CLINIC | Age: 51
End: 2021-07-13

## 2021-07-13 DIAGNOSIS — Z94.1 HEART REPLACED BY TRANSPLANT: Primary | ICD-10-CM

## 2021-07-16 ENCOUNTER — OFFICE VISIT (OUTPATIENT)
Dept: TRANSPLANT | Facility: CLINIC | Age: 51
End: 2021-07-16
Payer: COMMERCIAL

## 2021-07-16 ENCOUNTER — LAB VISIT (OUTPATIENT)
Dept: LAB | Facility: HOSPITAL | Age: 51
End: 2021-07-16
Payer: COMMERCIAL

## 2021-07-16 ENCOUNTER — HOSPITAL ENCOUNTER (OUTPATIENT)
Dept: CARDIOLOGY | Facility: HOSPITAL | Age: 51
Discharge: HOME | End: 2021-07-16
Attending: INTERNAL MEDICINE
Payer: COMMERCIAL

## 2021-07-16 VITALS
DIASTOLIC BLOOD PRESSURE: 83 MMHG | SYSTOLIC BLOOD PRESSURE: 133 MMHG | HEIGHT: 68 IN | BODY MASS INDEX: 34.08 KG/M2 | WEIGHT: 224.88 LBS | HEART RATE: 109 BPM

## 2021-07-16 VITALS
WEIGHT: 218 LBS | HEART RATE: 99 BPM | DIASTOLIC BLOOD PRESSURE: 90 MMHG | HEIGHT: 68 IN | SYSTOLIC BLOOD PRESSURE: 128 MMHG | BODY MASS INDEX: 33.04 KG/M2

## 2021-07-16 DIAGNOSIS — Z94.1 HEART TRANSPLANTED: ICD-10-CM

## 2021-07-16 DIAGNOSIS — I42.5 RESTRICTIVE CARDIOMYOPATHY: ICD-10-CM

## 2021-07-16 DIAGNOSIS — E66.09 CLASS 1 OBESITY DUE TO EXCESS CALORIES WITH SERIOUS COMORBIDITY AND BODY MASS INDEX (BMI) OF 34.0 TO 34.9 IN ADULT: ICD-10-CM

## 2021-07-16 DIAGNOSIS — D86.85 CARDIAC SARCOIDOSIS: ICD-10-CM

## 2021-07-16 DIAGNOSIS — Z94.1 HEART REPLACED BY TRANSPLANT: ICD-10-CM

## 2021-07-16 DIAGNOSIS — Z94.1 STATUS POST HEART TRANSPLANT: ICD-10-CM

## 2021-07-16 DIAGNOSIS — Z94.1 S/P ORTHOTOPIC HEART TRANSPLANT: Primary | ICD-10-CM

## 2021-07-16 DIAGNOSIS — E78.5 HYPERLIPIDEMIA LDL GOAL <100: ICD-10-CM

## 2021-07-16 LAB
ALBUMIN SERPL BCP-MCNC: 4.2 G/DL (ref 3.5–5.2)
ALP SERPL-CCNC: 100 U/L (ref 55–135)
ALT SERPL W/O P-5'-P-CCNC: 11 U/L (ref 10–44)
ANION GAP SERPL CALC-SCNC: 13 MMOL/L (ref 8–16)
ASCENDING AORTA: 2.47 CM
AST SERPL-CCNC: 19 U/L (ref 10–40)
AV INDEX (PROSTH): 0.71
AV MEAN GRADIENT: 4 MMHG
AV PEAK GRADIENT: 7 MMHG
AV VALVE AREA: 2.68 CM2
AV VELOCITY RATIO: 0.75
BASOPHILS # BLD AUTO: 0.05 K/UL (ref 0–0.2)
BASOPHILS NFR BLD: 0.8 % (ref 0–1.9)
BILIRUB SERPL-MCNC: 0.7 MG/DL (ref 0.1–1)
BNP SERPL-MCNC: 19 PG/ML (ref 0–99)
BSA FOR ECHO PROCEDURE: 2.18 M2
BUN SERPL-MCNC: 29 MG/DL (ref 6–20)
CALCIUM SERPL-MCNC: 10.1 MG/DL (ref 8.7–10.5)
CHLORIDE SERPL-SCNC: 97 MMOL/L (ref 95–110)
CHOLEST SERPL-MCNC: 153 MG/DL (ref 120–199)
CHOLEST/HDLC SERPL: 3.1 {RATIO} (ref 2–5)
CO2 SERPL-SCNC: 27 MMOL/L (ref 23–29)
CREAT SERPL-MCNC: 1.4 MG/DL (ref 0.5–1.4)
CV ECHO LV RWT: 0.38 CM
DIFFERENTIAL METHOD: ABNORMAL
DOP CALC AO PEAK VEL: 1.35 M/S
DOP CALC AO VTI: 20.76 CM
DOP CALC LVOT AREA: 3.8 CM2
DOP CALC LVOT DIAMETER: 2.19 CM
DOP CALC LVOT PEAK VEL: 1.01 M/S
DOP CALC LVOT STROKE VOLUME: 55.72 CM3
DOP CALCLVOT PEAK VEL VTI: 14.8 CM
E WAVE DECELERATION TIME: 87.84 MSEC
E/A RATIO: 1.18
E/E' RATIO: 8.19 M/S
ECHO LV POSTERIOR WALL: 0.85 CM (ref 0.6–1.1)
EJECTION FRACTION: 65 %
EOSINOPHIL # BLD AUTO: 0.2 K/UL (ref 0–0.5)
EOSINOPHIL NFR BLD: 3.3 % (ref 0–8)
ERYTHROCYTE [DISTWIDTH] IN BLOOD BY AUTOMATED COUNT: 12.5 % (ref 11.5–14.5)
EST. GFR  (AFRICAN AMERICAN): 50.2 ML/MIN/1.73 M^2
EST. GFR  (NON AFRICAN AMERICAN): 43.5 ML/MIN/1.73 M^2
FRACTIONAL SHORTENING: 37 % (ref 28–44)
GLUCOSE SERPL-MCNC: 110 MG/DL (ref 70–110)
HCT VFR BLD AUTO: 42.3 % (ref 37–48.5)
HDLC SERPL-MCNC: 50 MG/DL (ref 40–75)
HDLC SERPL: 32.7 % (ref 20–50)
HGB BLD-MCNC: 13.2 G/DL (ref 12–16)
IMM GRANULOCYTES # BLD AUTO: 0.07 K/UL (ref 0–0.04)
IMM GRANULOCYTES NFR BLD AUTO: 1.1 % (ref 0–0.5)
INTERVENTRICULAR SEPTUM: 0.94 CM (ref 0.6–1.1)
LDLC SERPL CALC-MCNC: 77.4 MG/DL (ref 63–159)
LEFT INTERNAL DIMENSION IN SYSTOLE: 2.77 CM (ref 2.1–4)
LEFT VENTRICLE DIASTOLIC VOLUME INDEX: 42.09 ML/M2
LEFT VENTRICLE DIASTOLIC VOLUME: 89.24 ML
LEFT VENTRICLE MASS INDEX: 61 G/M2
LEFT VENTRICLE SYSTOLIC VOLUME INDEX: 13.6 ML/M2
LEFT VENTRICLE SYSTOLIC VOLUME: 28.84 ML
LEFT VENTRICULAR INTERNAL DIMENSION IN DIASTOLE: 4.43 CM (ref 3.5–6)
LEFT VENTRICULAR MASS: 128.48 G
LV LATERAL E/E' RATIO: 7.17 M/S
LV SEPTAL E/E' RATIO: 9.56 M/S
LYMPHOCYTES # BLD AUTO: 0.8 K/UL (ref 1–4.8)
LYMPHOCYTES NFR BLD: 13.1 % (ref 18–48)
MAGNESIUM SERPL-MCNC: 1.7 MG/DL (ref 1.6–2.6)
MCH RBC QN AUTO: 27.8 PG (ref 27–31)
MCHC RBC AUTO-ENTMCNC: 31.2 G/DL (ref 32–36)
MCV RBC AUTO: 89 FL (ref 82–98)
MONOCYTES # BLD AUTO: 0.8 K/UL (ref 0.3–1)
MONOCYTES NFR BLD: 12.8 % (ref 4–15)
MV A" WAVE DURATION": 9.13 MSEC
MV PEAK A VEL: 0.73 M/S
MV PEAK E VEL: 0.86 M/S
MV STENOSIS PRESSURE HALF TIME: 25.47 MS
MV VALVE AREA P 1/2 METHOD: 8.64 CM2
NEUTROPHILS # BLD AUTO: 4.2 K/UL (ref 1.8–7.7)
NEUTROPHILS NFR BLD: 68.9 % (ref 38–73)
NONHDLC SERPL-MCNC: 103 MG/DL
NRBC BLD-RTO: 0 /100 WBC
PLATELET # BLD AUTO: 347 K/UL (ref 150–450)
PMV BLD AUTO: 9.2 FL (ref 9.2–12.9)
POTASSIUM SERPL-SCNC: 3.1 MMOL/L (ref 3.5–5.1)
PROT SERPL-MCNC: 7.4 G/DL (ref 6–8.4)
PULM VEIN S/D RATIO: 1.35
PV PEAK D VEL: 0.34 M/S
PV PEAK S VEL: 0.46 M/S
RA PRESSURE: 3 MMHG
RBC # BLD AUTO: 4.75 M/UL (ref 4–5.4)
RIGHT VENTRICULAR END-DIASTOLIC DIMENSION: 3.08 CM
RV TISSUE DOPPLER FREE WALL SYSTOLIC VELOCITY 1 (APICAL 4 CHAMBER VIEW): 9.62 CM/S
SINUS: 2.6 CM
SODIUM SERPL-SCNC: 137 MMOL/L (ref 136–145)
STJ: 2.64 CM
TACROLIMUS BLD-MCNC: 13.4 NG/ML (ref 5–15)
TACROLIMUS, NORMALIZED: 11.6 NG/ML (ref 5–15)
TDI LATERAL: 0.12 M/S
TDI SEPTAL: 0.09 M/S
TDI: 0.11 M/S
TRICUSPID ANNULAR PLANE SYSTOLIC EXCURSION: 1.46 CM
TRIGL SERPL-MCNC: 128 MG/DL (ref 30–150)
WBC # BLD AUTO: 6.11 K/UL (ref 3.9–12.7)

## 2021-07-16 PROCEDURE — 3075F PR MOST RECENT SYSTOLIC BLOOD PRESS GE 130-139MM HG: ICD-10-PCS | Mod: CPTII,S$GLB,, | Performed by: INTERNAL MEDICINE

## 2021-07-16 PROCEDURE — 86977 RBC SERUM PRETX INCUBJ/INHIB: CPT | Mod: 59,PO | Performed by: INTERNAL MEDICINE

## 2021-07-16 PROCEDURE — 93306 TTE W/DOPPLER COMPLETE: CPT

## 2021-07-16 PROCEDURE — 86832 HLA CLASS I HIGH DEFIN QUAL: CPT | Mod: PO | Performed by: INTERNAL MEDICINE

## 2021-07-16 PROCEDURE — 1159F PR MEDICATION LIST DOCUMENTED IN MEDICAL RECORD: ICD-10-PCS | Mod: CPTII,S$GLB,, | Performed by: INTERNAL MEDICINE

## 2021-07-16 PROCEDURE — 3075F SYST BP GE 130 - 139MM HG: CPT | Mod: CPTII,S$GLB,, | Performed by: INTERNAL MEDICINE

## 2021-07-16 PROCEDURE — 86833 HLA CLASS II HIGH DEFIN QUAL: CPT | Mod: 59,PO | Performed by: INTERNAL MEDICINE

## 2021-07-16 PROCEDURE — 99214 OFFICE O/P EST MOD 30 MIN: CPT | Mod: S$GLB,,, | Performed by: INTERNAL MEDICINE

## 2021-07-16 PROCEDURE — 3079F PR MOST RECENT DIASTOLIC BLOOD PRESSURE 80-89 MM HG: ICD-10-PCS | Mod: CPTII,S$GLB,, | Performed by: INTERNAL MEDICINE

## 2021-07-16 PROCEDURE — 93306 ECHO (CUPID ONLY): ICD-10-PCS | Mod: 26,,, | Performed by: INTERNAL MEDICINE

## 2021-07-16 PROCEDURE — 83735 ASSAY OF MAGNESIUM: CPT | Performed by: INTERNAL MEDICINE

## 2021-07-16 PROCEDURE — 1159F MED LIST DOCD IN RCRD: CPT | Mod: CPTII,S$GLB,, | Performed by: INTERNAL MEDICINE

## 2021-07-16 PROCEDURE — 80197 ASSAY OF TACROLIMUS: CPT | Performed by: INTERNAL MEDICINE

## 2021-07-16 PROCEDURE — 93306 TTE W/DOPPLER COMPLETE: CPT | Mod: 26,,, | Performed by: INTERNAL MEDICINE

## 2021-07-16 PROCEDURE — 3008F PR BODY MASS INDEX (BMI) DOCUMENTED: ICD-10-PCS | Mod: CPTII,S$GLB,, | Performed by: INTERNAL MEDICINE

## 2021-07-16 PROCEDURE — 83880 ASSAY OF NATRIURETIC PEPTIDE: CPT | Performed by: INTERNAL MEDICINE

## 2021-07-16 PROCEDURE — 85025 COMPLETE CBC W/AUTO DIFF WBC: CPT | Performed by: INTERNAL MEDICINE

## 2021-07-16 PROCEDURE — 86833 HLA CLASS II HIGH DEFIN QUAL: CPT | Mod: PO | Performed by: INTERNAL MEDICINE

## 2021-07-16 PROCEDURE — 86832 HLA CLASS I HIGH DEFIN QUAL: CPT | Mod: 59,PO | Performed by: INTERNAL MEDICINE

## 2021-07-16 PROCEDURE — 36415 COLL VENOUS BLD VENIPUNCTURE: CPT | Performed by: INTERNAL MEDICINE

## 2021-07-16 PROCEDURE — 3079F DIAST BP 80-89 MM HG: CPT | Mod: CPTII,S$GLB,, | Performed by: INTERNAL MEDICINE

## 2021-07-16 PROCEDURE — 99999 PR PBB SHADOW E&M-EST. PATIENT-LVL IV: ICD-10-PCS | Mod: PBBFAC,,, | Performed by: INTERNAL MEDICINE

## 2021-07-16 PROCEDURE — 3008F BODY MASS INDEX DOCD: CPT | Mod: CPTII,S$GLB,, | Performed by: INTERNAL MEDICINE

## 2021-07-16 PROCEDURE — 80053 COMPREHEN METABOLIC PANEL: CPT | Performed by: INTERNAL MEDICINE

## 2021-07-16 PROCEDURE — 4010F PR ACE/ARB THEARPY RXD/TAKEN: ICD-10-PCS | Mod: CPTII,S$GLB,, | Performed by: INTERNAL MEDICINE

## 2021-07-16 PROCEDURE — 99214 PR OFFICE/OUTPT VISIT, EST, LEVL IV, 30-39 MIN: ICD-10-PCS | Mod: S$GLB,,, | Performed by: INTERNAL MEDICINE

## 2021-07-16 PROCEDURE — 80061 LIPID PANEL: CPT | Performed by: INTERNAL MEDICINE

## 2021-07-16 PROCEDURE — 4010F ACE/ARB THERAPY RXD/TAKEN: CPT | Mod: CPTII,S$GLB,, | Performed by: INTERNAL MEDICINE

## 2021-07-16 PROCEDURE — 99999 PR PBB SHADOW E&M-EST. PATIENT-LVL IV: CPT | Mod: PBBFAC,,, | Performed by: INTERNAL MEDICINE

## 2021-07-16 RX ORDER — POTASSIUM CHLORIDE 20 MEQ/1
20 TABLET, EXTENDED RELEASE ORAL DAILY
Qty: 30 TABLET | Refills: 6 | Status: SHIPPED | OUTPATIENT
Start: 2021-07-16 | End: 2022-01-06

## 2021-07-16 RX ORDER — POTASSIUM CHLORIDE 1500 MG/1
20 TABLET, EXTENDED RELEASE ORAL ONCE
COMMUNITY
End: 2021-09-24 | Stop reason: SDUPTHER

## 2021-07-19 ENCOUNTER — PATIENT MESSAGE (OUTPATIENT)
Dept: TRANSPLANT | Facility: CLINIC | Age: 51
End: 2021-07-19

## 2021-07-19 LAB
CLASS I ANTIBODY COMMENTS - LUMINEX: NORMAL
CLASS II ANTIBODY COMMENTS - LUMINEX: NORMAL
DSA1 TESTING DATE: NORMAL
DSA12 TESTING DATE: NORMAL
DSA2 TESTING DATE: NORMAL
HEART CARE KIT (SHORE): NORMAL
SERUM COLLECTION DT - LUMINEX CLASS I: NORMAL
SERUM COLLECTION DT - LUMINEX CLASS II: NORMAL

## 2021-07-22 ENCOUNTER — LAB VISIT (OUTPATIENT)
Dept: LAB | Facility: HOSPITAL | Age: 51
End: 2021-07-22
Payer: COMMERCIAL

## 2021-07-22 ENCOUNTER — PATIENT MESSAGE (OUTPATIENT)
Dept: TRANSPLANT | Facility: CLINIC | Age: 51
End: 2021-07-22

## 2021-07-22 DIAGNOSIS — Z94.1 STATUS POST HEART TRANSPLANT: ICD-10-CM

## 2021-07-22 LAB
ALBUMIN SERPL BCP-MCNC: 4.1 G/DL (ref 3.5–5.2)
ALP SERPL-CCNC: 85 U/L (ref 55–135)
ALT SERPL W/O P-5'-P-CCNC: 11 U/L (ref 10–44)
ANION GAP SERPL CALC-SCNC: 12 MMOL/L (ref 8–16)
AST SERPL-CCNC: 18 U/L (ref 10–40)
BILIRUB SERPL-MCNC: 0.3 MG/DL (ref 0.1–1)
BUN SERPL-MCNC: 24 MG/DL (ref 6–20)
CALCIUM SERPL-MCNC: 10.1 MG/DL (ref 8.7–10.5)
CHLORIDE SERPL-SCNC: 105 MMOL/L (ref 95–110)
CO2 SERPL-SCNC: 26 MMOL/L (ref 23–29)
CREAT SERPL-MCNC: 1.3 MG/DL (ref 0.5–1.4)
EST. GFR  (AFRICAN AMERICAN): 54.9 ML/MIN/1.73 M^2
EST. GFR  (NON AFRICAN AMERICAN): 47.6 ML/MIN/1.73 M^2
GLUCOSE SERPL-MCNC: 111 MG/DL (ref 70–110)
POTASSIUM SERPL-SCNC: 4 MMOL/L (ref 3.5–5.1)
PROT SERPL-MCNC: 7.1 G/DL (ref 6–8.4)
SODIUM SERPL-SCNC: 143 MMOL/L (ref 136–145)

## 2021-07-22 PROCEDURE — 80197 ASSAY OF TACROLIMUS: CPT | Performed by: INTERNAL MEDICINE

## 2021-07-22 PROCEDURE — 36415 COLL VENOUS BLD VENIPUNCTURE: CPT | Performed by: INTERNAL MEDICINE

## 2021-07-22 PROCEDURE — 80053 COMPREHEN METABOLIC PANEL: CPT | Performed by: INTERNAL MEDICINE

## 2021-07-23 DIAGNOSIS — Z94.1 HEART REPLACED BY TRANSPLANT: Primary | ICD-10-CM

## 2021-07-23 LAB
TACROLIMUS BLD-MCNC: 11.3 NG/ML (ref 5–15)
TACROLIMUS, NORMALIZED: 9.9 NG/ML (ref 5–15)

## 2021-07-23 RX ORDER — LISINOPRIL 5 MG/1
5 TABLET ORAL DAILY
Qty: 90 TABLET | Refills: 3 | Status: SHIPPED | OUTPATIENT
Start: 2021-07-23 | End: 2022-02-09

## 2021-07-26 ENCOUNTER — PATIENT MESSAGE (OUTPATIENT)
Dept: TRANSPLANT | Facility: CLINIC | Age: 51
End: 2021-07-26

## 2021-07-26 NOTE — PROGRESS NOTES
PRE-EDUCATION BOOKLET NOTE:    Met with Deborah Navas and had a brief discussion regarding the advanced heart failure evaluation process including options for heart transplantation and/or left ventricular assist device (LVAD) implantation.     Heart Transplant Educational Booklet given to patient, which included the following handouts:  · Treatment options for Advanced Heart Failure: A Referral Guide for patients  · Pre Heart Transplant Coordinator Team Contact Information   · Recipient Informed Consent   · Wellness Contract  · Multiple Listing Protocol and [facility] toll free numbers   · VAD Education Packet   · Advanced Directives  · 8 Step Plan for Heart Failure Patients     Significant History:  · Prior sternotomies: no  · ICD (if yes, indicate brand of device): Yes: Medtronic 2/19  · Blood transfusions (if yes, enter date and location): No  · Angiography (if yes, enter date and location): Yes: 2016 [facility]--no blockages  · Colonoscopy (if yes, enter date and location): Yes: > 30 yrs ago.  Will update locally  · Pap smear (if yes, enter date and location): Yes: >4 yrs ago, will update locally  · Mammogram (if yes, enter date and location): Yes: > 3 yrs ago will update locally  · Tobacco history (if yes, enter amount and date quit if applicable): no  · Stroke history:  no  · Thromboembolism history:  no    Question and answer session was conducted.  Patient was advised to bring the educational packet to future appointments and/or admissions.  Patient was encouraged to contact the coordinator team with any questions or concerns.  Understanding verbalized.      Plan is for RHC for risk stratification.    Results discussed directly with patient while patient was present. Any further details documented in the note.   Jazzimne Del Rio PA-C

## 2021-07-27 ENCOUNTER — TELEPHONE (OUTPATIENT)
Dept: TRANSPLANT | Facility: CLINIC | Age: 51
End: 2021-07-27

## 2021-07-27 ENCOUNTER — PATIENT MESSAGE (OUTPATIENT)
Dept: TRANSPLANT | Facility: CLINIC | Age: 51
End: 2021-07-27

## 2021-07-30 ENCOUNTER — TELEPHONE (OUTPATIENT)
Dept: TRANSPLANT | Facility: CLINIC | Age: 51
End: 2021-07-30

## 2021-07-30 ENCOUNTER — PATIENT MESSAGE (OUTPATIENT)
Dept: TRANSPLANT | Facility: CLINIC | Age: 51
End: 2021-07-30

## 2021-08-02 ENCOUNTER — LAB VISIT (OUTPATIENT)
Dept: LAB | Facility: HOSPITAL | Age: 51
End: 2021-08-02
Attending: INTERNAL MEDICINE
Payer: COMMERCIAL

## 2021-08-02 DIAGNOSIS — Z94.1 HEART REPLACED BY TRANSPLANT: ICD-10-CM

## 2021-08-02 DIAGNOSIS — Z94.1 STATUS POST HEART TRANSPLANT: ICD-10-CM

## 2021-08-02 PROCEDURE — 80197 ASSAY OF TACROLIMUS: CPT | Performed by: INTERNAL MEDICINE

## 2021-08-02 PROCEDURE — 36415 COLL VENOUS BLD VENIPUNCTURE: CPT | Performed by: INTERNAL MEDICINE

## 2021-08-02 PROCEDURE — 80048 BASIC METABOLIC PNL TOTAL CA: CPT | Performed by: INTERNAL MEDICINE

## 2021-08-03 ENCOUNTER — PATIENT MESSAGE (OUTPATIENT)
Dept: TRANSPLANT | Facility: CLINIC | Age: 51
End: 2021-08-03

## 2021-08-03 LAB
ANION GAP SERPL CALC-SCNC: 9 MMOL/L (ref 8–16)
BUN SERPL-MCNC: 28 MG/DL (ref 6–20)
CALCIUM SERPL-MCNC: 10.2 MG/DL (ref 8.7–10.5)
CHLORIDE SERPL-SCNC: 111 MMOL/L (ref 95–110)
CO2 SERPL-SCNC: 24 MMOL/L (ref 23–29)
CREAT SERPL-MCNC: 1.2 MG/DL (ref 0.5–1.4)
EST. GFR  (AFRICAN AMERICAN): >60 ML/MIN/1.73 M^2
EST. GFR  (NON AFRICAN AMERICAN): 52.5 ML/MIN/1.73 M^2
GLUCOSE SERPL-MCNC: 98 MG/DL (ref 70–110)
POTASSIUM SERPL-SCNC: 4.9 MMOL/L (ref 3.5–5.1)
SODIUM SERPL-SCNC: 144 MMOL/L (ref 136–145)
TACROLIMUS BLD-MCNC: 9.8 NG/ML (ref 5–15)
TACROLIMUS, NORMALIZED: 8.6 NG/ML (ref 5–15)

## 2021-08-10 ENCOUNTER — PATIENT MESSAGE (OUTPATIENT)
Dept: TRANSPLANT | Facility: CLINIC | Age: 51
End: 2021-08-10

## 2021-08-23 ENCOUNTER — IMMUNIZATION (OUTPATIENT)
Dept: PRIMARY CARE CLINIC | Facility: CLINIC | Age: 51
End: 2021-08-23
Payer: COMMERCIAL

## 2021-08-23 DIAGNOSIS — Z23 NEED FOR VACCINATION: Primary | ICD-10-CM

## 2021-08-23 PROCEDURE — 91300 COVID-19, MRNA, LNP-S, PF, 30 MCG/0.3 ML DOSE VACCINE: CPT | Mod: S$GLB,,, | Performed by: FAMILY MEDICINE

## 2021-08-23 PROCEDURE — 0003A COVID-19, MRNA, LNP-S, PF, 30 MCG/0.3 ML DOSE VACCINE: ICD-10-PCS | Mod: CV19,S$GLB,, | Performed by: FAMILY MEDICINE

## 2021-08-23 PROCEDURE — 0003A COVID-19, MRNA, LNP-S, PF, 30 MCG/0.3 ML DOSE VACCINE: CPT | Mod: CV19,S$GLB,, | Performed by: FAMILY MEDICINE

## 2021-08-23 PROCEDURE — 91300 COVID-19, MRNA, LNP-S, PF, 30 MCG/0.3 ML DOSE VACCINE: ICD-10-PCS | Mod: S$GLB,,, | Performed by: FAMILY MEDICINE

## 2021-09-02 ENCOUNTER — PATIENT MESSAGE (OUTPATIENT)
Dept: TRANSPLANT | Facility: CLINIC | Age: 51
End: 2021-09-02

## 2021-09-03 ENCOUNTER — PATIENT MESSAGE (OUTPATIENT)
Dept: TRANSPLANT | Facility: CLINIC | Age: 51
End: 2021-09-03

## 2021-09-24 ENCOUNTER — OFFICE VISIT (OUTPATIENT)
Dept: TRANSPLANT | Facility: CLINIC | Age: 51
End: 2021-09-24
Payer: COMMERCIAL

## 2021-09-24 ENCOUNTER — HOSPITAL ENCOUNTER (OUTPATIENT)
Dept: CARDIOLOGY | Facility: HOSPITAL | Age: 51
Discharge: HOME | End: 2021-09-24
Attending: INTERNAL MEDICINE
Payer: COMMERCIAL

## 2021-09-24 VITALS
SYSTOLIC BLOOD PRESSURE: 130 MMHG | HEART RATE: 97 BPM | HEIGHT: 68 IN | BODY MASS INDEX: 33.04 KG/M2 | DIASTOLIC BLOOD PRESSURE: 80 MMHG | WEIGHT: 218 LBS

## 2021-09-24 VITALS
SYSTOLIC BLOOD PRESSURE: 143 MMHG | DIASTOLIC BLOOD PRESSURE: 85 MMHG | WEIGHT: 224.19 LBS | BODY MASS INDEX: 33.98 KG/M2 | HEIGHT: 68 IN | HEART RATE: 107 BPM

## 2021-09-24 DIAGNOSIS — Z94.1 HEART TRANSPLANTED: Primary | ICD-10-CM

## 2021-09-24 DIAGNOSIS — E78.5 HYPERLIPIDEMIA LDL GOAL <100: ICD-10-CM

## 2021-09-24 DIAGNOSIS — Z94.1 S/P ORTHOTOPIC HEART TRANSPLANT: ICD-10-CM

## 2021-09-24 DIAGNOSIS — D84.821 IMMUNOCOMPROMISED STATE DUE TO DRUG THERAPY: ICD-10-CM

## 2021-09-24 DIAGNOSIS — Z79.899 IMMUNOCOMPROMISED STATE DUE TO DRUG THERAPY: ICD-10-CM

## 2021-09-24 DIAGNOSIS — Z94.1 STATUS POST HEART TRANSPLANT: ICD-10-CM

## 2021-09-24 DIAGNOSIS — D86.85 CARDIAC SARCOIDOSIS: ICD-10-CM

## 2021-09-24 LAB
ASCENDING AORTA: 2.83 CM
AV INDEX (PROSTH): 0.73
AV MEAN GRADIENT: 4 MMHG
AV PEAK GRADIENT: 8 MMHG
AV VALVE AREA: 2.27 CM2
AV VELOCITY RATIO: 0.68
BSA FOR ECHO PROCEDURE: 2.18 M2
CV ECHO LV RWT: 0.39 CM
DOP CALC AO PEAK VEL: 1.4 M/S
DOP CALC AO VTI: 24.67 CM
DOP CALC LVOT AREA: 3.1 CM2
DOP CALC LVOT DIAMETER: 1.99 CM
DOP CALC LVOT PEAK VEL: 0.95 M/S
DOP CALC LVOT STROKE VOLUME: 55.93 CM3
DOP CALCLVOT PEAK VEL VTI: 17.99 CM
E/E' RATIO: 7.52 M/S
ECHO LV POSTERIOR WALL: 0.86 CM (ref 0.6–1.1)
EJECTION FRACTION: 60 %
FRACTIONAL SHORTENING: 35 % (ref 28–44)
INTERVENTRICULAR SEPTUM: 0.74 CM (ref 0.6–1.1)
IVRT: 79.92 MSEC
LEFT INTERNAL DIMENSION IN SYSTOLE: 2.89 CM (ref 2.1–4)
LEFT VENTRICLE DIASTOLIC VOLUME INDEX: 42.8 ML/M2
LEFT VENTRICLE DIASTOLIC VOLUME: 90.74 ML
LEFT VENTRICLE MASS INDEX: 53 G/M2
LEFT VENTRICLE SYSTOLIC VOLUME INDEX: 15.1 ML/M2
LEFT VENTRICLE SYSTOLIC VOLUME: 32.02 ML
LEFT VENTRICULAR INTERNAL DIMENSION IN DIASTOLE: 4.46 CM (ref 3.5–6)
LEFT VENTRICULAR MASS: 111.95 G
LV LATERAL E/E' RATIO: 6.58 M/S
LV SEPTAL E/E' RATIO: 8.78 M/S
MV PEAK E VEL: 0.79 M/S
PISA TR MAX VEL: 2.13 M/S
RA PRESSURE: 3 MMHG
RIGHT VENTRICULAR END-DIASTOLIC DIMENSION: 3.29 CM
RV TISSUE DOPPLER FREE WALL SYSTOLIC VELOCITY 1 (APICAL 4 CHAMBER VIEW): 7.24 CM/S
SINUS: 3.13 CM
STJ: 2.5 CM
TDI LATERAL: 0.12 M/S
TDI SEPTAL: 0.09 M/S
TDI: 0.11 M/S
TR MAX PG: 18 MMHG
TRICUSPID ANNULAR PLANE SYSTOLIC EXCURSION: 1.46 CM
TV REST PULMONARY ARTERY PRESSURE: 21 MMHG

## 2021-09-24 PROCEDURE — 3079F DIAST BP 80-89 MM HG: CPT | Mod: CPTII,S$GLB,, | Performed by: INTERNAL MEDICINE

## 2021-09-24 PROCEDURE — 1159F MED LIST DOCD IN RCRD: CPT | Mod: CPTII,S$GLB,, | Performed by: INTERNAL MEDICINE

## 2021-09-24 PROCEDURE — 93306 ECHO (CUPID ONLY): ICD-10-PCS | Mod: 26,,, | Performed by: INTERNAL MEDICINE

## 2021-09-24 PROCEDURE — 93306 TTE W/DOPPLER COMPLETE: CPT | Mod: 26,,, | Performed by: INTERNAL MEDICINE

## 2021-09-24 PROCEDURE — 3077F SYST BP >= 140 MM HG: CPT | Mod: CPTII,S$GLB,, | Performed by: INTERNAL MEDICINE

## 2021-09-24 PROCEDURE — 3008F PR BODY MASS INDEX (BMI) DOCUMENTED: ICD-10-PCS | Mod: CPTII,S$GLB,, | Performed by: INTERNAL MEDICINE

## 2021-09-24 PROCEDURE — 3008F BODY MASS INDEX DOCD: CPT | Mod: CPTII,S$GLB,, | Performed by: INTERNAL MEDICINE

## 2021-09-24 PROCEDURE — 99999 PR PBB SHADOW E&M-EST. PATIENT-LVL III: CPT | Mod: PBBFAC,,, | Performed by: INTERNAL MEDICINE

## 2021-09-24 PROCEDURE — 1159F PR MEDICATION LIST DOCUMENTED IN MEDICAL RECORD: ICD-10-PCS | Mod: CPTII,S$GLB,, | Performed by: INTERNAL MEDICINE

## 2021-09-24 PROCEDURE — 4010F ACE/ARB THERAPY RXD/TAKEN: CPT | Mod: CPTII,S$GLB,, | Performed by: INTERNAL MEDICINE

## 2021-09-24 PROCEDURE — 99214 OFFICE O/P EST MOD 30 MIN: CPT | Mod: S$GLB,,, | Performed by: INTERNAL MEDICINE

## 2021-09-24 PROCEDURE — 3079F PR MOST RECENT DIASTOLIC BLOOD PRESSURE 80-89 MM HG: ICD-10-PCS | Mod: CPTII,S$GLB,, | Performed by: INTERNAL MEDICINE

## 2021-09-24 PROCEDURE — 99999 PR PBB SHADOW E&M-EST. PATIENT-LVL III: ICD-10-PCS | Mod: PBBFAC,,, | Performed by: INTERNAL MEDICINE

## 2021-09-24 PROCEDURE — 3077F PR MOST RECENT SYSTOLIC BLOOD PRESSURE >= 140 MM HG: ICD-10-PCS | Mod: CPTII,S$GLB,, | Performed by: INTERNAL MEDICINE

## 2021-09-24 PROCEDURE — 4010F PR ACE/ARB THEARPY RXD/TAKEN: ICD-10-PCS | Mod: CPTII,S$GLB,, | Performed by: INTERNAL MEDICINE

## 2021-09-24 PROCEDURE — 93306 TTE W/DOPPLER COMPLETE: CPT

## 2021-09-24 PROCEDURE — 99214 PR OFFICE/OUTPT VISIT, EST, LEVL IV, 30-39 MIN: ICD-10-PCS | Mod: S$GLB,,, | Performed by: INTERNAL MEDICINE

## 2021-09-24 RX ORDER — OXYCODONE AND ACETAMINOPHEN 5; 325 MG/1; MG/1
1 TABLET ORAL DAILY PRN
COMMUNITY
End: 2023-08-04

## 2021-10-12 NOTE — SUBJECTIVE & OBJECTIVE
Interval History: No complaints, no events overnight. Feels generally well. Denies NVD, SOB, Chest pain.  Shaking improved. GERD also improved. Plan for RHC with position changes tomorrow.     Continuous Infusions:   epinephrine 0.02 mcg/kg/min (12/04/19 1000)    nitric oxide gas       Scheduled Meds:   amiodarone  400 mg Oral BID    aspirin  325 mg Oral Daily    furosemide  80 mg Intravenous Once    furosemide  80 mg Oral Daily    gabapentin  200 mg Oral BID    hepatitis A virus vaccine (PF)  1,440 Units Intramuscular Once    lisinopril  10 mg Oral Daily    magnesium oxide  400 mg Oral Daily    mexiletine  200 mg Oral Q8H    mirtazapine  15 mg Oral QHS    pantoprazole  40 mg Oral BID AC    polyethylene glycol  17 g Oral Daily    senna-docusate 8.6-50 mg  1 tablet Oral BID    spironolactone  12.5 mg Oral Daily    sucralfate  1 g Oral QID (AC & HS)    venlafaxine  150 mg Oral Daily    warfarin  1 mg Oral Daily     PRN Meds:ALPRAZolam, pneumoc 13-amber conj-dip cr(PF), promethazine, sodium chloride, white petrolatum, zolpidem    Review of patient's allergies indicates:   Allergen Reactions    Adhesive Blisters     Reaction to area in chest and up only    Codeine Itching     Objective:     Vital Signs (Most Recent):  Temp: 97.9 °F (36.6 °C) (12/04/19 0701)  Pulse: 84 (12/04/19 1000)  Resp: 20 (12/04/19 1000)  BP: (!) 70/0 (12/04/19 0701)  SpO2: 98 % (12/04/19 0701) Vital Signs (24h Range):  Temp:  [97.6 °F (36.4 °C)-98.1 °F (36.7 °C)] 97.9 °F (36.6 °C)  Pulse:  [73-89] 84  Resp:  [15-37] 20  SpO2:  [98 %-99 %] 98 %  BP: (70-88)/(0) 70/0     Patient Vitals for the past 72 hrs (Last 3 readings):   Weight   12/04/19 0300 104.1 kg (229 lb 8 oz)   12/03/19 1600 104.7 kg (230 lb 13.2 oz)   12/02/19 2300 104.2 kg (229 lb 11.5 oz)     Body mass index is 35.94 kg/m².      Intake/Output Summary (Last 24 hours) at 12/4/2019 1017  Last data filed at 12/4/2019 1000  Gross per 24 hour   Intake 1284 ml   Output  3810 ml   Net -2526 ml       Hemodynamic Parameters:     Telemetry: Paced     Physical Exam    Constitutional: She is oriented to person, place, and time. She appears well-developed and well-nourished. Mild tremors in UEs bilaterally  HENT:   Mouth/Throat: Oropharynx is clear and moist.   Eyes: EOM are normal.   Neck: JVD mid neck present.   Cardiovascular: Normal rate, regular rhythm and intact distal pulses.   Smooth VAD hum   Pulmonary/Chest: Effort normal and breath sounds normal. No respiratory distress. She has no rales.   Abdominal: Soft. Bowel sounds are normal. She exhibits no distension. There is no tenderness. There is no guarding.   Musculoskeletal: She exhibits no edema.   Neurological: She is alert and oriented to person, place, and time.   Skin: Skin is warm.    Significant Labs:  CBC:  Recent Labs   Lab 12/02/19  0523 12/03/19  0403 12/04/19  0356   WBC 5.01 6.41 5.34   RBC 4.44 4.14 4.09   HGB 10.8* 10.3* 10.0*   HCT 37.9 35.1* 34.8*    322 305   MCV 85 85 85   MCH 24.3* 24.9* 24.4*   MCHC 28.5* 29.3* 28.7*     BNP:  Recent Labs   Lab 11/29/19  0501 12/02/19  0523 12/04/19  0356   * 129* 298*     CMP:  Recent Labs   Lab 11/29/19  0501  12/02/19  0523 12/03/19  0403 12/03/19  1141 12/04/19  0356   *   < > 114* 142* 105 128*   CALCIUM 9.6   < > 9.2 9.1 9.6 9.2   ALBUMIN 3.3*  --  3.3*  --   --  3.3*   PROT 7.0  --  6.7  --   --  6.6      < > 138 136 139 140   K 4.6   < > 5.1 4.5 4.7 4.4   CO2 25   < > 24 27 27 27      < > 102 100 102 101   BUN 28*   < > 30* 32* 30* 33*   CREATININE 1.7*   < > 1.7* 2.1* 2.0* 2.0*   ALKPHOS 96  --  92  --   --  85   ALT 16  --  14  --   --  12   AST 23  --  19  --   --  16   BILITOT 0.2  --  0.3  --   --  0.4    < > = values in this interval not displayed.      Coagulation:   Recent Labs   Lab 12/02/19  0523 12/03/19  0403 12/04/19  0356   INR 3.9* 3.6* 2.5*   APTT 39.3* 40.5* 38.0*     LDH:  Recent Labs   Lab 12/02/19  0523  12/03/19  0403    236     Microbiology:  Microbiology Results (last 7 days)     ** No results found for the last 168 hours. **          I have reviewed all pertinent labs within the past 24 hours.    Estimated Creatinine Clearance: 41.8 mL/min (A) (based on SCr of 2 mg/dL (H)).    Diagnostic Results:  I have reviewed and interpreted all pertinent imaging results/findings within the past 24 hours.   Traveling alone

## 2021-10-18 ENCOUNTER — PATIENT MESSAGE (OUTPATIENT)
Dept: TRANSPLANT | Facility: CLINIC | Age: 51
End: 2021-10-18
Payer: COMMERCIAL

## 2021-10-25 ENCOUNTER — PATIENT MESSAGE (OUTPATIENT)
Dept: TRANSPLANT | Facility: CLINIC | Age: 51
End: 2021-10-25
Payer: COMMERCIAL

## 2021-11-10 DIAGNOSIS — Z94.1 S/P ORTHOTOPIC HEART TRANSPLANT: Primary | ICD-10-CM

## 2021-11-24 DIAGNOSIS — Z94.1 S/P ORTHOTOPIC HEART TRANSPLANT: Primary | ICD-10-CM

## 2021-11-29 ENCOUNTER — PATIENT MESSAGE (OUTPATIENT)
Dept: TRANSPLANT | Facility: CLINIC | Age: 51
End: 2021-11-29
Payer: COMMERCIAL

## 2021-12-01 DIAGNOSIS — Z94.1 HEART TRANSPLANTED: Primary | ICD-10-CM

## 2022-01-24 NOTE — PLAN OF CARE
No acute events throughout day, VS and assessment per flow sheet, see below for updates:    Pulmonary: Patient remains on 4L NC with 20 PPM NO. Lungs clear. Sats checked q4.    Cardiovascular: Paced on monitor. Doppler 88/92/88. CVP 11/11/9. HM3: flows 3.6-4.3, speed 5100, PI 2.9-6.0, power 3.5-3.6.    Neurological: AAOx4    Gastrointestinal: 1 BM, no nausea.    Genitourinary: Voids per BSC.    Endocrine: WNL    Skin/Bath: See flowsheets for charting   Date of last CHG bath given: 12/11/19 @ 1500 per patient    Infusions: Epi @0.02    Patient progressing towards goals as tolerated, plan of care communicated and reviewed with Deborah Navas and family, all concerns addressed, will continue to monitor.     Roz Heredia RN      CM[facility] DAILY GOALS       A: Awake    RASS: Goal - RASS Goal: 0-->alert and calm  Actual - RASS (Patel Agitation-Sedation Scale): 0-->alert and calm   Restraint necessity:    B: Breath   SBT: Not intubated   C: Coordinate A & B, analgesics/sedatives   Pain: managed    SAT: Not intubated  D: Delirium   CAM-ICU: Overall CAM-ICU: Negative  E: Early Mobility   MOVE Screen: Pass   Activity: Activity Management: activity adjusted per tolerance  FAS: Feeding/Nutrition   Diet order: Diet/Nutrition Received: low saturated fat/low cholesterol, restrict fluids,   Fluid restriction: Fluid Requirement: 2L FR  T: Thrombus   DVT prophylaxis: VTE Required Core Measure: Pharmacological prophylaxis initiated/maintained  H: HOB Elevation   Head of Bed (HOB): HOB at 30-45 degrees  U: Ulcer Prophylaxis   GI: yes  G: Glucose control   managed    S: Skin   Bundle compliance: yes   Bathing/Skin Care: back care, bath, chlorhexidine, bath, complete, dressed/undressed, linen changed Date: 12/11/19  B: Bowel Function   no issues   I: Indwelling Catheters   Fonseca necessity:     CVC necessity: Yes   IPAD offered: No  D: De-escalation Antibx   No  Plan for the day   Continue current, no change to  POC  Family/Goals of care/Code Status   Code Status: Full Code     No acute events throughout day, VS and assessment per flow sheet, patient progressing towards goals as tolerated, plan of care reviewed with Deborah Navas and family, all concerns addressed, will continue to monitor.       181

## 2022-02-09 DIAGNOSIS — Z94.1 HEART REPLACED BY TRANSPLANT: ICD-10-CM

## 2022-02-09 DIAGNOSIS — Z94.1 STATUS POST HEART TRANSPLANT: ICD-10-CM

## 2022-02-09 RX ORDER — GABAPENTIN 300 MG/1
300 CAPSULE ORAL NIGHTLY
Qty: 30 CAPSULE | Refills: 11 | Status: SHIPPED | OUTPATIENT
Start: 2022-02-09 | End: 2022-02-11

## 2022-02-09 RX ORDER — PANTOPRAZOLE SODIUM 40 MG/1
40 TABLET, DELAYED RELEASE ORAL DAILY
Qty: 30 TABLET | Refills: 11 | Status: SHIPPED | OUTPATIENT
Start: 2022-02-09 | End: 2022-02-11

## 2022-02-09 RX ORDER — TACROLIMUS 1 MG/1
3 CAPSULE ORAL EVERY 12 HOURS
Qty: 180 CAPSULE | Refills: 11 | Status: SHIPPED | OUTPATIENT
Start: 2022-02-09 | End: 2022-02-11

## 2022-02-09 RX ORDER — ATORVASTATIN CALCIUM 20 MG/1
20 TABLET, FILM COATED ORAL NIGHTLY
Qty: 90 TABLET | Refills: 3 | Status: SHIPPED | OUTPATIENT
Start: 2022-02-09 | End: 2022-02-11

## 2022-02-09 RX ORDER — POTASSIUM CHLORIDE 20 MEQ/1
20 TABLET, EXTENDED RELEASE ORAL DAILY
Qty: 30 TABLET | Refills: 11 | Status: SHIPPED | OUTPATIENT
Start: 2022-02-09 | End: 2022-02-11

## 2022-02-09 RX ORDER — LISINOPRIL 5 MG/1
5 TABLET ORAL DAILY
Qty: 90 TABLET | Refills: 3 | Status: SHIPPED | OUTPATIENT
Start: 2022-02-09 | End: 2022-02-11

## 2022-02-09 RX ORDER — MYCOPHENOLATE MOFETIL 250 MG/1
1000 CAPSULE ORAL 2 TIMES DAILY
Qty: 240 CAPSULE | Refills: 11 | Status: SHIPPED | OUTPATIENT
Start: 2022-02-09 | End: 2022-02-11

## 2022-02-09 RX ORDER — PREDNISONE 5 MG/1
5 TABLET ORAL DAILY
Qty: 30 TABLET | Refills: 11 | Status: SHIPPED | OUTPATIENT
Start: 2022-02-09 | End: 2022-02-11

## 2022-02-11 DIAGNOSIS — Z94.1 STATUS POST HEART TRANSPLANT: ICD-10-CM

## 2022-02-11 DIAGNOSIS — Z94.1 HEART REPLACED BY TRANSPLANT: ICD-10-CM

## 2022-02-11 RX ORDER — MYCOPHENOLATE MOFETIL 250 MG/1
1000 CAPSULE ORAL 2 TIMES DAILY
Qty: 240 CAPSULE | Refills: 11 | Status: SHIPPED | OUTPATIENT
Start: 2022-02-11 | End: 2023-03-24

## 2022-02-11 RX ORDER — LISINOPRIL 5 MG/1
5 TABLET ORAL DAILY
Qty: 90 TABLET | Refills: 3 | Status: SHIPPED | OUTPATIENT
Start: 2022-02-11 | End: 2022-02-17 | Stop reason: ALTCHOICE

## 2022-02-11 RX ORDER — TACROLIMUS 1 MG/1
3 CAPSULE ORAL EVERY 12 HOURS
Qty: 180 CAPSULE | Refills: 11 | Status: SHIPPED | OUTPATIENT
Start: 2022-02-11 | End: 2022-05-03

## 2022-02-11 RX ORDER — PANTOPRAZOLE SODIUM 40 MG/1
40 TABLET, DELAYED RELEASE ORAL DAILY
Qty: 30 TABLET | Refills: 11 | Status: SHIPPED | OUTPATIENT
Start: 2022-02-11 | End: 2023-01-27 | Stop reason: SDUPTHER

## 2022-02-11 RX ORDER — ATORVASTATIN CALCIUM 20 MG/1
20 TABLET, FILM COATED ORAL NIGHTLY
Qty: 90 TABLET | Refills: 3 | Status: SHIPPED | OUTPATIENT
Start: 2022-02-11 | End: 2022-02-17 | Stop reason: SDUPTHER

## 2022-02-11 RX ORDER — POTASSIUM CHLORIDE 20 MEQ/1
20 TABLET, EXTENDED RELEASE ORAL DAILY
Qty: 30 TABLET | Refills: 11 | Status: SHIPPED | OUTPATIENT
Start: 2022-02-11 | End: 2023-02-03

## 2022-02-11 RX ORDER — GABAPENTIN 300 MG/1
300 CAPSULE ORAL NIGHTLY
Qty: 30 CAPSULE | Refills: 11 | Status: SHIPPED | OUTPATIENT
Start: 2022-02-11 | End: 2023-03-24

## 2022-02-11 RX ORDER — PREDNISONE 5 MG/1
5 TABLET ORAL DAILY
Qty: 30 TABLET | Refills: 11 | Status: SHIPPED | OUTPATIENT
Start: 2022-02-11 | End: 2023-02-22

## 2022-02-15 ENCOUNTER — TELEPHONE (OUTPATIENT)
Dept: CARDIOLOGY | Facility: HOSPITAL | Age: 52
End: 2022-02-15
Payer: COMMERCIAL

## 2022-02-16 ENCOUNTER — TELEPHONE (OUTPATIENT)
Dept: TRANSPLANT | Facility: CLINIC | Age: 52
End: 2022-02-16
Payer: COMMERCIAL

## 2022-02-16 ENCOUNTER — PATIENT MESSAGE (OUTPATIENT)
Dept: TRANSPLANT | Facility: CLINIC | Age: 52
End: 2022-02-16
Payer: COMMERCIAL

## 2022-02-16 NOTE — TELEPHONE ENCOUNTER
Attempted to contact pt regarding PET Scan not being authorized and needing to reschedule. Message sent in [employer] for pt to call.

## 2022-02-16 NOTE — TELEPHONE ENCOUNTER
Pt returned my call. Discussed Pet Scan not being authorized for tomorrow. Gave pt option of rescheduling all of her appts or just Pet Scan. AFter long discussion, will reschedule PET Scan when next is available. Advised I was leaving the Pet Scan appt on for tomorrow until morning to see if it was authorized over night.

## 2022-02-17 ENCOUNTER — HOSPITAL ENCOUNTER (OUTPATIENT)
Dept: RADIOLOGY | Facility: HOSPITAL | Age: 52
Discharge: HOME | End: 2022-02-17
Attending: INTERNAL MEDICINE
Payer: COMMERCIAL

## 2022-02-17 ENCOUNTER — HOSPITAL ENCOUNTER (OUTPATIENT)
Dept: CARDIOLOGY | Facility: HOSPITAL | Age: 52
Discharge: HOME | End: 2022-02-17
Attending: INTERNAL MEDICINE
Payer: COMMERCIAL

## 2022-02-17 ENCOUNTER — DOCUMENTATION ONLY (OUTPATIENT)
Dept: CARDIOLOGY | Facility: CLINIC | Age: 52
End: 2022-02-17
Payer: COMMERCIAL

## 2022-02-17 ENCOUNTER — OFFICE VISIT (OUTPATIENT)
Dept: CARDIOLOGY | Facility: CLINIC | Age: 52
End: 2022-02-17
Payer: COMMERCIAL

## 2022-02-17 ENCOUNTER — OFFICE VISIT (OUTPATIENT)
Dept: TRANSPLANT | Facility: CLINIC | Age: 52
End: 2022-02-17
Payer: COMMERCIAL

## 2022-02-17 VITALS — HEART RATE: 115 BPM

## 2022-02-17 VITALS
DIASTOLIC BLOOD PRESSURE: 95 MMHG | HEIGHT: 68 IN | SYSTOLIC BLOOD PRESSURE: 131 MMHG | BODY MASS INDEX: 32.91 KG/M2 | HEART RATE: 115 BPM | OXYGEN SATURATION: 96 % | WEIGHT: 217.13 LBS

## 2022-02-17 VITALS
BODY MASS INDEX: 33.95 KG/M2 | HEART RATE: 117 BPM | HEIGHT: 68 IN | DIASTOLIC BLOOD PRESSURE: 86 MMHG | HEART RATE: 102 BPM | SYSTOLIC BLOOD PRESSURE: 136 MMHG | DIASTOLIC BLOOD PRESSURE: 89 MMHG | WEIGHT: 224 LBS | SYSTOLIC BLOOD PRESSURE: 135 MMHG | BODY MASS INDEX: 32.98 KG/M2 | HEIGHT: 68 IN | WEIGHT: 217.63 LBS

## 2022-02-17 DIAGNOSIS — E78.5 HYPERLIPIDEMIA LDL GOAL <100: ICD-10-CM

## 2022-02-17 DIAGNOSIS — Z94.1 S/P ORTHOTOPIC HEART TRANSPLANT: Primary | ICD-10-CM

## 2022-02-17 DIAGNOSIS — Z94.1 HEART TRANSPLANTED: Primary | ICD-10-CM

## 2022-02-17 DIAGNOSIS — D84.821 IMMUNODEFICIENCY DUE TO TREATMENT WITH IMMUNOSUPPRESSIVE MEDICATION: ICD-10-CM

## 2022-02-17 DIAGNOSIS — Z94.1 S/P ORTHOTOPIC HEART TRANSPLANT: ICD-10-CM

## 2022-02-17 DIAGNOSIS — Z94.1 STATUS POST HEART TRANSPLANT: ICD-10-CM

## 2022-02-17 DIAGNOSIS — E66.09 CLASS 1 OBESITY DUE TO EXCESS CALORIES WITH SERIOUS COMORBIDITY AND BODY MASS INDEX (BMI) OF 34.0 TO 34.9 IN ADULT: ICD-10-CM

## 2022-02-17 DIAGNOSIS — N18.31 STAGE 3A CHRONIC KIDNEY DISEASE: ICD-10-CM

## 2022-02-17 DIAGNOSIS — I10 PRIMARY HYPERTENSION: ICD-10-CM

## 2022-02-17 DIAGNOSIS — E78.2 MIXED HYPERLIPIDEMIA: ICD-10-CM

## 2022-02-17 DIAGNOSIS — Z94.1 HEART TRANSPLANTED: ICD-10-CM

## 2022-02-17 DIAGNOSIS — Z79.899 IMMUNODEFICIENCY DUE TO TREATMENT WITH IMMUNOSUPPRESSIVE MEDICATION: ICD-10-CM

## 2022-02-17 LAB
ASCENDING AORTA: 2.74 CM
AV INDEX (PROSTH): 0.73
AV MEAN GRADIENT: 3 MMHG
AV PEAK GRADIENT: 7 MMHG
AV VALVE AREA: 2.26 CM2
AV VELOCITY RATIO: 0.72
BSA FOR ECHO PROCEDURE: 2.21 M2
CV ECHO LV RWT: 0.36 CM
DOP CALC AO PEAK VEL: 1.29 M/S
DOP CALC AO VTI: 24.4 CM
DOP CALC LVOT AREA: 3.1 CM2
DOP CALC LVOT DIAMETER: 1.99 CM
DOP CALC LVOT PEAK VEL: 0.93 M/S
DOP CALC LVOT STROKE VOLUME: 55.24 CM3
DOP CALCLVOT PEAK VEL VTI: 17.77 CM
E WAVE DECELERATION TIME: 266.55 MSEC
E/A RATIO: 1.88
E/E' RATIO: 8.33 M/S
ECHO LV POSTERIOR WALL: 0.79 CM (ref 0.6–1.1)
EJECTION FRACTION: 65 %
FRACTIONAL SHORTENING: 38 % (ref 28–44)
INTERVENTRICULAR SEPTUM: 0.87 CM (ref 0.6–1.1)
LEFT INTERNAL DIMENSION IN SYSTOLE: 2.7 CM (ref 2.1–4)
LEFT VENTRICLE DIASTOLIC VOLUME INDEX: 39.99 ML/M2
LEFT VENTRICLE DIASTOLIC VOLUME: 85.57 ML
LEFT VENTRICLE MASS INDEX: 53 G/M2
LEFT VENTRICLE SYSTOLIC VOLUME INDEX: 12.6 ML/M2
LEFT VENTRICLE SYSTOLIC VOLUME: 26.95 ML
LEFT VENTRICULAR INTERNAL DIMENSION IN DIASTOLE: 4.35 CM (ref 3.5–6)
LEFT VENTRICULAR MASS: 112.73 G
LV LATERAL E/E' RATIO: 6.25 M/S
LV SEPTAL E/E' RATIO: 12.5 M/S
MV A" WAVE DURATION": 10.56 MSEC
MV PEAK A VEL: 0.4 M/S
MV PEAK E VEL: 0.75 M/S
MV STENOSIS PRESSURE HALF TIME: 77.3 MS
MV VALVE AREA P 1/2 METHOD: 2.85 CM2
PISA TR MAX VEL: 1.93 M/S
PULM VEIN S/D RATIO: 2
PV PEAK D VEL: 0.35 M/S
PV PEAK S VEL: 0.7 M/S
RA PRESSURE: 3 MMHG
RIGHT VENTRICULAR END-DIASTOLIC DIMENSION: 2.81 CM
RV TISSUE DOPPLER FREE WALL SYSTOLIC VELOCITY 1 (APICAL 4 CHAMBER VIEW): 6.04 CM/S
SINUS: 2.88 CM
STJ: 2.28 CM
TDI LATERAL: 0.12 M/S
TDI SEPTAL: 0.06 M/S
TDI: 0.09 M/S
TR MAX PG: 15 MMHG
TRICUSPID ANNULAR PLANE SYSTOLIC EXCURSION: 1.13 CM
TV REST PULMONARY ARTERY PRESSURE: 18 MMHG

## 2022-02-17 PROCEDURE — 99999 PR PBB SHADOW E&M-EST. PATIENT-LVL V: ICD-10-PCS | Mod: PBBFAC,,, | Performed by: INTERNAL MEDICINE

## 2022-02-17 PROCEDURE — 93306 TTE W/DOPPLER COMPLETE: CPT

## 2022-02-17 PROCEDURE — 4010F ACE/ARB THERAPY RXD/TAKEN: CPT | Mod: CPTII,S$GLB,, | Performed by: INTERNAL MEDICINE

## 2022-02-17 PROCEDURE — 71046 XR CHEST PA AND LATERAL: ICD-10-PCS | Mod: 26,,, | Performed by: RADIOLOGY

## 2022-02-17 PROCEDURE — 93306 ECHO (CUPID ONLY): ICD-10-PCS | Mod: 26,,, | Performed by: INTERNAL MEDICINE

## 2022-02-17 PROCEDURE — 99214 OFFICE O/P EST MOD 30 MIN: CPT | Mod: S$GLB,,, | Performed by: INTERNAL MEDICINE

## 2022-02-17 PROCEDURE — 99999 PR PBB SHADOW E&M-EST. PATIENT-LVL V: CPT | Mod: PBBFAC,,, | Performed by: INTERNAL MEDICINE

## 2022-02-17 PROCEDURE — 4010F PR ACE/ARB THEARPY RXD/TAKEN: ICD-10-PCS | Mod: CPTII,S$GLB,, | Performed by: INTERNAL MEDICINE

## 2022-02-17 PROCEDURE — 3075F PR MOST RECENT SYSTOLIC BLOOD PRESS GE 130-139MM HG: ICD-10-PCS | Mod: CPTII,S$GLB,, | Performed by: INTERNAL MEDICINE

## 2022-02-17 PROCEDURE — 3080F PR MOST RECENT DIASTOLIC BLOOD PRESSURE >= 90 MM HG: ICD-10-PCS | Mod: CPTII,S$GLB,, | Performed by: INTERNAL MEDICINE

## 2022-02-17 PROCEDURE — 1159F PR MEDICATION LIST DOCUMENTED IN MEDICAL RECORD: ICD-10-PCS | Mod: CPTII,S$GLB,, | Performed by: INTERNAL MEDICINE

## 2022-02-17 PROCEDURE — 3080F DIAST BP >= 90 MM HG: CPT | Mod: CPTII,S$GLB,, | Performed by: INTERNAL MEDICINE

## 2022-02-17 PROCEDURE — 3079F DIAST BP 80-89 MM HG: CPT | Mod: CPTII,S$GLB,, | Performed by: INTERNAL MEDICINE

## 2022-02-17 PROCEDURE — 78433 MYOCRD IMG PET 2RTRACER CT: CPT

## 2022-02-17 PROCEDURE — 3044F PR MOST RECENT HEMOGLOBIN A1C LEVEL <7.0%: ICD-10-PCS | Mod: CPTII,S$GLB,, | Performed by: INTERNAL MEDICINE

## 2022-02-17 PROCEDURE — 3079F PR MOST RECENT DIASTOLIC BLOOD PRESSURE 80-89 MM HG: ICD-10-PCS | Mod: CPTII,S$GLB,, | Performed by: INTERNAL MEDICINE

## 2022-02-17 PROCEDURE — 3075F SYST BP GE 130 - 139MM HG: CPT | Mod: CPTII,S$GLB,, | Performed by: INTERNAL MEDICINE

## 2022-02-17 PROCEDURE — 99999 PR PBB SHADOW E&M-EST. PATIENT-LVL III: ICD-10-PCS | Mod: PBBFAC,,, | Performed by: INTERNAL MEDICINE

## 2022-02-17 PROCEDURE — 3044F HG A1C LEVEL LT 7.0%: CPT | Mod: CPTII,S$GLB,, | Performed by: INTERNAL MEDICINE

## 2022-02-17 PROCEDURE — 93306 TTE W/DOPPLER COMPLETE: CPT | Mod: 26,,, | Performed by: INTERNAL MEDICINE

## 2022-02-17 PROCEDURE — 3008F PR BODY MASS INDEX (BMI) DOCUMENTED: ICD-10-PCS | Mod: CPTII,S$GLB,, | Performed by: INTERNAL MEDICINE

## 2022-02-17 PROCEDURE — 99215 PR OFFICE/OUTPT VISIT, EST, LEVL V, 40-54 MIN: ICD-10-PCS | Mod: S$GLB,,, | Performed by: INTERNAL MEDICINE

## 2022-02-17 PROCEDURE — 3008F BODY MASS INDEX DOCD: CPT | Mod: CPTII,S$GLB,, | Performed by: INTERNAL MEDICINE

## 2022-02-17 PROCEDURE — 99215 OFFICE O/P EST HI 40 MIN: CPT | Mod: S$GLB,,, | Performed by: INTERNAL MEDICINE

## 2022-02-17 PROCEDURE — 71046 X-RAY EXAM CHEST 2 VIEWS: CPT | Mod: 26,,, | Performed by: RADIOLOGY

## 2022-02-17 PROCEDURE — 1159F MED LIST DOCD IN RCRD: CPT | Mod: CPTII,S$GLB,, | Performed by: INTERNAL MEDICINE

## 2022-02-17 PROCEDURE — 99999 PR PBB SHADOW E&M-EST. PATIENT-LVL III: CPT | Mod: PBBFAC,,, | Performed by: INTERNAL MEDICINE

## 2022-02-17 PROCEDURE — 71046 X-RAY EXAM CHEST 2 VIEWS: CPT | Mod: TC,FY

## 2022-02-17 PROCEDURE — A9552 F18 FDG: HCPCS

## 2022-02-17 PROCEDURE — 99214 PR OFFICE/OUTPT VISIT, EST, LEVL IV, 30-39 MIN: ICD-10-PCS | Mod: S$GLB,,, | Performed by: INTERNAL MEDICINE

## 2022-02-17 RX ORDER — ATORVASTATIN CALCIUM 40 MG/1
40 TABLET, FILM COATED ORAL NIGHTLY
Qty: 90 TABLET | Refills: 3 | Status: SHIPPED | OUTPATIENT
Start: 2022-02-17 | End: 2022-12-22

## 2022-02-17 RX ORDER — DIPHENHYDRAMINE HCL 50 MG
50 CAPSULE ORAL ONCE
Status: CANCELLED | OUTPATIENT
Start: 2022-02-17 | End: 2022-02-17

## 2022-02-17 RX ORDER — SODIUM CHLORIDE 9 MG/ML
INJECTION, SOLUTION INTRAVENOUS CONTINUOUS
Status: CANCELLED | OUTPATIENT
Start: 2022-02-17 | End: 2022-02-17

## 2022-02-17 RX ORDER — LOSARTAN POTASSIUM 50 MG/1
50 TABLET ORAL DAILY
Qty: 90 TABLET | Refills: 3 | Status: SHIPPED | OUTPATIENT
Start: 2022-02-17 | End: 2022-12-22

## 2022-02-17 NOTE — LETTER
February 17, 2022        Joaquin Del Toro  [address]  [address]  [facility] [address]  Phone: [phone]  Fax: [phone]             [id number]  [address]  [facility] [address]  Phone: [phone]   Patient: Deborah Navas   MR Number: [id number]   YOB: 1970   Date of Visit: 2/17/2022       Dear Dr. Joaquin Del Toro    Thank you for referring Deborah Navas to me for evaluation. Attached you will find relevant portions of my assessment and plan of care.    If you have questions, please do not hesitate to call me. I look forward to following Deborah Navas along with you.    Sincerely,    Renetta Chaudhari MD    Enclosure    If you would like to receive this communication electronically, please contact [email or web address] or [phone] to request [employer] Link access.    [employer] Link is a tool which provides read-only access to select patient information with whom you have a relationship. Its easy to use and provides real time access to review your patients record including encounter summaries, notes, results, and demographic information.    If you feel you have received this communication in error or would no longer like to receive these types of communications, please e-mail [email or web address]

## 2022-02-17 NOTE — PROGRESS NOTES
"Subjective:   Ms. Navas is a 51 y.o. year old White female who received a donation after brain death heart transplant on 12/16/19.      CMV status:   Donor: +  Recipient:-    HPI  Deborah Navas is a very pleasant 50 yo w s/p OHT 12/16/2019 is here for her 27th month post OHT visit. She continues to do really well. Has no cardiac complaints.  She has been very active and has lost about 7 LBS. Echo today showed preserved graft function with an EF=65%. Immunosuppression regimen includes; Tacro 3 mg BID, Cellcept 1000 mg BID, Prednisone 5 mg qd (left on due to sarcoidosis)     2D echo with CFD done today  · Tachycardia was present during the study.  · OHTx 12/16/19  · The estimated ejection fraction is 65%.  · The left ventricle is normal in size with normal systolic function.  · Normal left ventricular diastolic function.  · Normal right ventricular size with normal right ventricular systolic function.  · The estimated PA systolic pressure is 18 mmHg.  · Normal central venous pressure (3 mmHg).      Review of Systems   Constitutional: Negative. Negative for chills, decreased appetite, diaphoresis, fever, malaise/fatigue, night sweats, weight gain and weight loss.   Eyes: Negative.    Cardiovascular: Negative for chest pain, claudication, cyanosis, dyspnea on exertion, irregular heartbeat, leg swelling, near-syncope, orthopnea, palpitations, paroxysmal nocturnal dyspnea and syncope.   Respiratory: Negative for cough, hemoptysis and shortness of breath.    Endocrine: Negative.    Hematologic/Lymphatic: Negative.    Skin: Negative for color change, dry skin and nail changes.   Musculoskeletal: Negative.    Gastrointestinal: Negative.    Genitourinary: Negative.    Neurological: Negative for weakness.       Objective:   Blood pressure 135/89, pulse (!) 117, height 5' 8" (1.727 m), weight 98.7 kg (217 lb 9.5 oz).body mass index is 33.09 kg/m².    Physical Exam  Vitals and nursing note reviewed.   Constitutional:       " General: Vital signs are normal.      Appearance: She is well-developed and well-nourished.   HENT:      Head: Normocephalic.   Eyes:      Pupils: Pupils are equal, round, and reactive to light.   Neck:      Vascular: No JVD.   Cardiovascular:      Rate and Rhythm: Normal rate and regular rhythm.      Chest Wall: PMI is not displaced.      Pulses: Intact distal pulses.      Heart sounds: Normal heart sounds. No murmur heard.  No friction rub. No gallop.    Pulmonary:      Effort: Pulmonary effort is normal.      Breath sounds: Normal breath sounds. No wheezing or rales.   Abdominal:      General: Bowel sounds are normal.      Palpations: Abdomen is soft.   Musculoskeletal:         General: No edema.      Cervical back: Neck supple.   Neurological:      Mental Status: She is alert and oriented to person, place, and time.         Lab Results   Component Value Date    WBC 7.71 02/17/2022    HGB 13.9 02/17/2022    HCT 44.9 02/17/2022    MCV 89 02/17/2022     02/17/2022    CO2 23 02/17/2022    CREATININE 1.3 02/17/2022    CALCIUM 10.0 02/17/2022    ALBUMIN 4.0 02/17/2022    AST 17 02/17/2022    BNP 23 02/17/2022    ALT 12 02/17/2022       Lab Results   Component Value Date    INR 1.2 12/23/2019    INR 1.4 (H) 12/22/2019    INR 1.3 (H) 12/21/2019       BNP   Date Value Ref Range Status   09/24/2021 29 0 - 99 pg/mL Final     Comment:     Values of less than 100 pg/ml are consistent with non-CHF populations.   07/16/2021 19 0 - 99 pg/mL Final     Comment:     Values of less than 100 pg/ml are consistent with non-CHF populations.   04/26/2021 33 0 - 99 pg/mL Final     Comment:     Values of less than 100 pg/ml are consistent with non-CHF populations.       LD   Date Value Ref Range Status   12/16/2019 240 110 - 260 U/L Final     Comment:     Results are increased in hemolyzed samples.   12/15/2019 246 110 - 260 U/L Final     Comment:     Results are increased in hemolyzed samples.   12/14/2019 257 110 - 260 U/L Final      Comment:     Results are increased in hemolyzed samples.       Tacrolimus Lvl   Date Value Ref Range Status   09/24/2021 9.3 5.0 - 15.0 ng/mL Final     Comment:     Testing performed by a chemiluminescent microparticle   immunoassay on the [employer] i System.    Refer to the normalized LC-MS/MS value to bam.         Assessment:     1. S/P orthotopic heart transplant    2. Immunodeficiency due to treatment with immunosuppressive medication    3. Class 1 obesity due to excess calories with serious comorbidity and body mass index (BMI) of 34.0 to 34.9 in adult    4. Mixed hyperlipidemia        Plan:   Doing really well  Complains of cough. DC lisinopril and start losartan 50 mg daily. BMP next week.   Lipids are up. Increase atorvastatin to 40 mg at night  Continue current immuno regimen   Dermatology for routine skin cancer screening   She is up to date with her mammogram     Return instructions as set forth by post transplant schedule or as needed:    Clinic: Return for labs and/or biopsy weekly the first month, every two weeks during month 2 and then monthly for the first year at the provider or coordinator's discretion. During the second year, return to clinic every 3 months. Post transplant year 3-5 return every 6 months. There will be a comprehensive post transplant evaluation every year that may include LHC/RHC/biopsy, stress test, echo, CXR, and other health screening exams.    In addition to the clinical assessment, I have ordered Allomap testing for this patient to assist in identification of moderate/severe acute cellular rejection (ACR) in a pt with stable Allograft function instead of endomyocardial biopsy.     Patient is reminded to call with any health changes as these can be early signs of transplant complications. Patient is advised to make sure any new medications or changes of old medications are discussed with a pharmacist or physician knowledgeable with transplant to avoid  rejection/drug toxicity related to significant drug interactions.    Patient advised that it is recommended that all transplanted patients, and their close contacts and household members receive Covid vaccination.    UNOS Patient Status  Functional Status: 90% - Able to carry on normal activity: minor symptoms of disease  Physical Capacity: No Limitations  Working for Income: Unknown    Renetta Chaudhari MD

## 2022-02-17 NOTE — PROGRESS NOTES
Subjective:    Patient ID:  Deborah Navas is a 51 y.o. female who presents for follow-up of Heart Transplant      HPI  Mrs. Navas is a 50 y/o woman s/p OHT on 12/16/19.  Today she reports that her stamina is still not back to 100%.  However she denies chest pain or shortness of breath.  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 She reports bilateral dependent LE edema.  She denies orthopnea or PND.  She denies palpitations, syncope, or near syncope.    Past Medical History:   Diagnosis Date    Fractures     History of ventricular fibrillation 9/4/2019    History of ventricular tachycardia 9/4/2019    Hyperlipidemia     Immunodeficiency due to treatment with immunosuppressive medication     Immunodeficiency due to treatment with immunosuppressive medication     Migraine     Multinodular goiter 9/5/2019    Osteoarthritis     Restrictive cardiomyopathy post heart transplant      Past Surgical History:   Procedure Laterality Date    BACK SURGERY      2007    BIOPSY WITH ULTRASOUND GUIDANCE N/A 12/31/2019    Procedure: BIOPSY, WITH US GUIDANCE;  Surgeon: Eric Antunez Jr., MD;  Location: [facility] CATH LAB;  Service: Cardiology;  Laterality: N/A;    BIOPSY WITH ULTRASOUND GUIDANCE N/A 1/7/2020    Procedure: BIOPSY, WITH US GUIDANCE;  Surgeon: Renetta Chaudhari MD;  Location: [facility] CATH LAB;  Service: Cardiology;  Laterality: N/A;    BIOPSY WITH ULTRASOUND GUIDANCE N/A 1/14/2020    Procedure: BIOPSY, WITH US GUIDANCE;  Surgeon: Renetta Chaudhari MD;  Location: [facility] CATH LAB;  Service: Cardiology;  Laterality: N/A;    BIOPSY WITH ULTRASOUND GUIDANCE N/A 1/28/2020    Procedure: BIOPSY, WITH US GUIDANCE;  Surgeon: Jolene Lua MD;  Location: [facility] CATH LAB;  Service: Cardiology;  Laterality: N/A;    BIOPSY WITH ULTRASOUND GUIDANCE N/A 2/11/2020    Procedure: BIOPSY, WITH US GUIDANCE;  Surgeon: Renetta Chaudhari MD;  Location: [facility] CATH LAB;  Service: Cardiology;  Laterality: N/A;     BIOPSY WITH ULTRASOUND GUIDANCE N/A 3/10/2020    Procedure: BIOPSY, WITH US GUIDANCE;  Surgeon: Jolene Lua MD;  Location: [facility] CATH LAB;  Service: Cardiology;  Laterality: N/A;    BIOPSY WITH ULTRASOUND GUIDANCE N/A 3/30/2021    Procedure: BIOPSY, WITH US GUIDANCE;  Surgeon: Renetta Chaudhari MD;  Location: [facility] CATH LAB;  Service: Cardiology;  Laterality: N/A;    BONE GRAFT Left     from Left hip to Left FA    DECOMPRESSION OF NERVE Right 10/9/2020    Procedure: DECOMPRESSION, NERVE ulnar right elbow;  Surgeon: Shelly Vasques MD;  Location: [facility] OR;  Service: Orthopedics;  Laterality: Right;  General/Regional    DECOMPRESSION OF NERVE Left 12/18/2020    Procedure: DECOMPRESSION, NERVE- LEFT wrist and elbow;  Surgeon: Shelly Vasques MD;  Location: [facility] OR;  Service: Orthopedics;  Laterality: Left;  general with regional after    eardrum reconstruction  1980    ELBOW SURGERY Left [id number]    FOREARM FRACTURE SURGERY Bilateral [id number]    multiple surgeries    HEART TRANSPLANT N/A 12/16/2019    Procedure: TRANSPLANT, HEART;  Surgeon: Talha Nuñez MD;  Location: [address];  Service: Cardiothoracic;  Laterality: N/A;    INSERTION OF GRAFT TO PERICARDIUM  9/10/2019    Procedure: INSERTION, GRAFT, PERICARDIUM;  Surgeon: Shar Walden MD;  Location: [facility] OR [address] [place] [address];  Service: Cardiovascular;;    INSERTION OF IMPLANTABLE CARDIOVERTER-DEFIBRILLATOR (ICD) GENERATOR WITH TWO EXISTING LEADS      INSERTION OF PACEMAKER Left     LEFT HEART CATHETERIZATION Left 12/3/2020    Procedure: Left heart cath;  Surgeon: Daron Bills MD;  Location: [facility] CATH LAB;  Service: Cardiology;  Laterality: Left;    LEFT VENTRICULAR ASSIST DEVICE N/A 9/10/2019    Procedure: INSERTION-LEFT VENTRICULAR ASSIST DEVICE;  Surgeon: Shar Walden MD;  Location: [facility] OR [address] [place] [address];  Service: Cardiovascular;  Laterality: N/A;  DT HM3     LUMBAR FUSION  2007    L4-L5    RIGHT HEART CATHETERIZATION Right  9/4/2019    Procedure: INSERTION, CATHETER, RIGHT HEART;  Surgeon: Vi Bryant MD;  Location: [facility] CATH LAB;  Service: Cardiology;  Laterality: Right;    RIGHT HEART CATHETERIZATION N/A 11/18/2019    Procedure: INSERTION, CATHETER, RIGHT HEART;  Surgeon: Eric Antunez Jr., MD;  Location: [facility] CATH LAB;  Service: Cardiology;  Laterality: N/A;    RIGHT HEART CATHETERIZATION Right 12/31/2019    Procedure: INSERTION, CATHETER, RIGHT HEART;  Surgeon: Eric Antunez Jr., MD;  Location: [facility] CATH LAB;  Service: Cardiology;  Laterality: Right;    RIGHT HEART CATHETERIZATION Right 1/7/2020    Procedure: INSERTION, CATHETER, RIGHT HEART;  Surgeon: Renetta Chaudhari MD;  Location: [facility] CATH LAB;  Service: Cardiology;  Laterality: Right;    RIGHT HEART CATHETERIZATION Right 12/3/2020    Procedure: INSERTION, CATHETER, RIGHT HEART;  Surgeon: Daron Bills MD;  Location: [facility] CATH LAB;  Service: Cardiology;  Laterality: Right;    SINUS SURGERY Right 1994    with lymph nodes    STERNAL WOUND CLOSURE  9/10/2019    Procedure: CLOSURE, WOUND, STERNUM;  Surgeon: Shar Walden MD;  Location: [address];  Service: Cardiovascular;;    TEMPOROMANDIBULAR JOINT SURGERY Right 1988    TONSILLECTOMY      TREATMENT OF CARDIAC ARRHYTHMIA N/A 9/24/2019    Procedure: CARDIOVERSION;  Surgeon: Moe Barrera MD;  Location: [facility] EP LAB;  Service: Cardiology;  Laterality: N/A;  AF, DCCV/NADINE, anes, DM, Rm 3073    TYMPANOSTOMY TUBE PLACEMENT  1971- 1979    multiple tube placements    WOUND EXPLORATION Right 5/27/2020    Procedure: EXPLORATION, WOUND. Lymphocele;  Surgeon: NYDIA Bledsoe II, MD;  [facility];  Service: Cardiovascular;  Laterality: Right;       Current Outpatient Medications on File Prior to Visit   Medication Sig Dispense Refill    ascorbic acid, vitamin C, (VITAMIN C) 500 MG tablet Take 500 mg by mouth 3 (three) times daily.      aspirin (ECOTRIN) 81 MG EC tablet Take 1 tablet (81 mg  total) by mouth once daily. 30 tablet 11    BIOTIN ORAL Take 500 mg by mouth.      calcium carbonate-vitamin D3 1,000 mg(2,500 mg)-800 unit Tab Take 1 tablet by mouth once daily. 30 tablet 11    ferrous sulfate 325 (65 FE) MG EC tablet Take 1 tablet (325 mg total) by mouth 3 (three) times daily with meals. And take 4 oz of OJ or at least 250 mg of Vit C with each dose 90 tablet 3    gabapentin (NEURONTIN) 300 MG capsule Take 1 capsule (300 mg total) by mouth every evening. 30 capsule 11    magnesium oxide (MAG-OX) 400 mg (241.3 mg magnesium) tablet Take 1 tablet (400 mg total) by mouth once daily. 30 tablet 11    multivitamin capsule Take 1 capsule by mouth once daily.      mycophenolate (CELLCEPT) 250 mg Cap Take 4 capsules (1,000 mg total) by mouth 2 (two) times daily. 240 capsule 11    pantoprazole (PROTONIX) 40 MG tablet Take 1 tablet (40 mg total) by mouth once daily. 30 tablet 11    potassium chloride SA (K-DUR,KLOR-CON) 20 MEQ tablet Take 1 tablet (20 mEq total) by mouth once daily. 30 tablet 11    predniSONE (DELTASONE) 5 MG tablet Take 1 tablet (5 mg total) by mouth once daily. 30 tablet 11    senna-docusate 8.6-50 mg (PERICOLACE) 8.6-50 mg per tablet Take 2 tablets by mouth 2 (two) times daily as needed for Constipation. (Patient taking differently: Take 1 tablet by mouth once daily.)      tacrolimus (PROGRAF) 1 MG Cap Take 3 capsules (3 mg total) by mouth every 12 (twelve) hours. 180 capsule 11    venlafaxine (EFFEXOR-XR) 150 MG Cp24 Take 1 capsule (150 mg total) by mouth once daily. 30 capsule 11    zolpidem (AMBIEN CR) 12.5 MG CR tablet 12.5 mg nightly.      [DISCONTINUED] atorvastatin (LIPITOR) 20 MG tablet Take 1 tablet (20 mg total) by mouth every evening. 90 tablet 3    [DISCONTINUED] lisinopriL (PRINIVIL,ZESTRIL) 5 MG tablet Take 1 tablet (5 mg total) by mouth once daily. 90 tablet 3    atorvastatin (LIPITOR) 40 MG tablet Take 1 tablet (40 mg total) by mouth every evening. 90  "tablet 3    losartan (COZAAR) 50 MG tablet Take 1 tablet (50 mg total) by mouth once daily. 90 tablet 3    opw transplant care kit Welcome to [facility].  This is your transplant care kit. 1 kit 0    oxyCODONE-acetaminophen (PERCOCET) 5-325 mg per tablet Take 1 tablet by mouth daily as needed for Pain.       Current Facility-Administered Medications on File Prior to Visit   Medication Dose Route Frequency Provider Last Rate Last Admin    fentaNYL injection 25 mcg  25 mcg Intravenous Q5 Min PRN Shelly Skinner MD        midazolam (VERSED) 1 mg/mL injection 0.5 mg  0.5 mg Intravenous PRN Shelly Skinner MD         Review of patient's allergies indicates:   Allergen Reactions    Adhesive Blisters     "Reaction to chest only up to neck. Denies any problems w/adhesive on any other areas of the body.    Codeine Itching     Social History     Tobacco Use    Smoking status: Never Smoker    Smokeless tobacco: Never Used   Substance Use Topics    Alcohol use: No    Drug use: No     Family History   Problem Relation Age of Onset    Osteoarthritis Mother     Migraines Mother     Osteoarthritis Maternal Grandmother     Migraines Maternal Grandmother     Broken bones Maternal Grandmother     Osteoporosis Maternal Grandmother     Dislocations Maternal Grandmother     Scoliosis Maternal Grandmother     Osteoarthritis Paternal Grandmother     Cancer Paternal Grandmother         leukemia    Migraines Paternal Grandmother     Obesity Paternal Grandmother     Osteoarthritis Paternal Grandfather     Heart failure Paternal Grandfather     Migraines Paternal Grandfather     Heart failure Maternal Grandfather     Migraines Maternal Grandfather     Osteoarthritis Maternal Grandfather     Stroke Father     Osteoarthritis Father        Review of Systems   Constitutional: Positive for malaise/fatigue. Negative for diaphoresis, fever and weight gain.   HENT: Negative for congestion, hearing " "loss, nosebleeds and sore throat.    Eyes: Negative for visual disturbance.   Cardiovascular: Negative for chest pain, claudication, dyspnea on exertion, irregular heartbeat, leg swelling, near-syncope, orthopnea, palpitations, paroxysmal nocturnal dyspnea and syncope.   Respiratory: Negative for cough, hemoptysis, shortness of breath and wheezing.    Endocrine: Negative for polyuria.   Hematologic/Lymphatic: Negative for bleeding problem. Does not bruise/bleed easily.   Skin: Negative for poor wound healing and rash.   Musculoskeletal: Negative for myalgias.   Gastrointestinal: Negative for abdominal pain, change in bowel habit, constipation, diarrhea, dysphagia, heartburn, hematemesis, melena, nausea and vomiting.   Genitourinary: Negative for bladder incontinence and dysuria.   Neurological: Negative for focal weakness, headaches and weakness.   Psychiatric/Behavioral: Negative for depression.   Allergic/Immunologic: Negative for hives and persistent infections.        Objective:  Vitals:    02/17/22 0943 02/17/22 0945   BP: (!) 126/97 (!) 131/95   BP Location: Left arm Right arm   Patient Position: Sitting Sitting   BP Method: Large (Automatic) Large (Automatic)   Pulse: (!) 117 (!) 115   SpO2: 96%    Weight: 98.5 kg (217 lb 2.5 oz)    Height: 5' 8" (1.727 m)          Physical Exam  Constitutional:       Appearance: She is well-developed and well-nourished. She is not diaphoretic.   HENT:      Head: Normocephalic and atraumatic.   Eyes:      General: No scleral icterus.        Right eye: No discharge.      Extraocular Movements: EOM normal.      Pupils: Pupils are equal, round, and reactive to light.   Neck:      Thyroid: No thyromegaly.      Vascular: No JVD.   Cardiovascular:      Rate and Rhythm: Normal rate and regular rhythm.      Chest Wall: PMI is not displaced.      Pulses:           Carotid pulses are 2+ on the right side and 2+ on the left side.       Radial pulses are 2+ on the right side and 2+ on " the left side.        Femoral pulses are 2+ on the right side and 2+ on the left side.       Dorsalis pedis pulses are 2+ on the right side and 2+ on the left side.        Posterior tibial pulses are 2+ on the right side and 2+ on the left side.      Heart sounds: No murmur heard.  No gallop.       Comments: Normal right radial Allan's test  Pulmonary:      Effort: Pulmonary effort is normal.      Breath sounds: Normal breath sounds.   Abdominal:      General: Bowel sounds are normal.      Palpations: Abdomen is soft. There is no hepatosplenomegaly.      Tenderness: There is no abdominal tenderness.   Lymphadenopathy:      Cervical: No cervical adenopathy.   Skin:     General: Skin is warm, dry and intact.   Neurological:      Mental Status: She is alert.      Gait: Gait normal.   Psychiatric:         Mood and Affect: Mood and affect normal.           Assessment:       1. Heart transplanted    2. Hyperlipidemia LDL goal <100    3. S/P orthotopic heart transplant    4. Stage 3a chronic kidney disease         Plan:       1) s/p OHT. I discussed cardiac catheterization including IVUS with the patient as part of a surveillance program for CAV including the risks.  The patient stated she would like to proceed.  -6Fr R Radial access  The risks, benefits, and alternatives of cardiac catheterization and possible intervention were discussed with the patient.  All questions were answered and informed consent was obtained.    2) Dyslipidemia. 2/17/22 lipid profile reviewed;  -continue Lipitor 40mg po qday    3) HTN. Continue losartan 50mg po qday    All of the patient's questions were answered.

## 2022-02-17 NOTE — PROGRESS NOTES
OUTPATIENT CATHETERIZATION INSTRUCTIONS    You have been scheduled for a procedure in the catheterization lab on Friday, March 18, 2022.     Please report to the Cardiology Waiting Area on the Third floor of the hospital and check in at 7 AM.   You will then be taken to the SSCU (Short Stay Cardiac Unit) and prepared for your procedure. Please be aware that this is not the time of your procedure but the time you are to arrive. The procedures are scheduled on an hourly basis; however, emergency cases take precedence over all other cases.       You may not have anything to eat or drink after midnight the night before your test. You may take your regular morning medications with water. If there are any medications that you should not take you will be instructed to hold them that morning. If you are diabetic and on Metformin (Glucophage) do not take it the day before, the day of, and for 2 days after your procedure.      The procedure will take 1-2 hours to perform. After the procedure, you will return to SSCU on the third floor of the hospital. You will need to lie still (or keep your arm still) for the next 4 to 6 hours to minimize bleeding from the puncture site. Your family may remain in the room with you during this time.       You may be able to be discharged home that same afternoon if there is someone to drive you home and there were no complications. If you have one of the balloon, stent, or device procedures you may spend the night in the hospital. Your doctor will determine, based on your progress, the date and time of your discharge. The results of your procedure will be discussed with you before you are discharged. Any further testing or procedures will be scheduled for you either before you leave or you will be called with these appointments.       If you should have any questions, concerns, or need to change the date of your procedure, please call  HELEN Simms @ [phone]    Special  Instructions:    Drink plenty of water the day before and after your procedure.     THE ABOVE INSTRUCTIONS WERE GIVEN TO THE PATIENT VERBALLY AND THEY VERBALIZED UNDERSTANDING.  THEY DO NOT REQUIRE ANY SPECIAL NEEDS AND DO NOT HAVE ANY LEARNING BARRIERS.          Directions for Reporting to Cardiology Waiting Area in the Hospital  If you park in the Parking Garage:  Take elevators to the1st floor of the parking garage.  Continue past the gift shop, coffee shop, and piano.  Take a right and go to the gold elevators. (Elevator B)  Take the elevator to the 3rd floor.  Follow the arrow on the sign on the wall that says Cath Lab Registration/EP Lab Registration.  Follow the long hallway all the way around until you come to a big open area.  This is the registration area.  Check in at Reception Desk.    OR    If family is dropping you off:  Have them drop you off at the front of the Hospital under the green overhang.  Enter through the doors and take a right.  Take the E elevators to the 3rd floor Cardiology Waiting Area.  Check in at the Reception Desk in the waiting room.

## 2022-02-17 NOTE — H&P (VIEW-ONLY)
"Subjective:   Ms. Navas is a 51 y.o. year old White female who received a donation after brain death heart transplant on 12/16/19.      CMV status:   Donor: +  Recipient:-    HPI  Deborah Naavs is a very pleasant 52 yo w s/p OHT 12/16/2019 is here for her 27th month post OHT visit. She continues to do really well. Has no cardiac complaints.  She has been very active and has lost about 7 LBS. Echo today showed preserved graft function with an EF=65%. Immunosuppression regimen includes; Tacro 3 mg BID, Cellcept 1000 mg BID, Prednisone 5 mg qd (left on due to sarcoidosis)     2D echo with CFD done today  · Tachycardia was present during the study.  · OHTx 12/16/19  · The estimated ejection fraction is 65%.  · The left ventricle is normal in size with normal systolic function.  · Normal left ventricular diastolic function.  · Normal right ventricular size with normal right ventricular systolic function.  · The estimated PA systolic pressure is 18 mmHg.  · Normal central venous pressure (3 mmHg).      Review of Systems   Constitutional: Negative. Negative for chills, decreased appetite, diaphoresis, fever, malaise/fatigue, night sweats, weight gain and weight loss.   Eyes: Negative.    Cardiovascular: Negative for chest pain, claudication, cyanosis, dyspnea on exertion, irregular heartbeat, leg swelling, near-syncope, orthopnea, palpitations, paroxysmal nocturnal dyspnea and syncope.   Respiratory: Negative for cough, hemoptysis and shortness of breath.    Endocrine: Negative.    Hematologic/Lymphatic: Negative.    Skin: Negative for color change, dry skin and nail changes.   Musculoskeletal: Negative.    Gastrointestinal: Negative.    Genitourinary: Negative.    Neurological: Negative for weakness.       Objective:   Blood pressure 135/89, pulse (!) 117, height 5' 8" (1.727 m), weight 98.7 kg (217 lb 9.5 oz).body mass index is 33.09 kg/m².    Physical Exam  Vitals and nursing note reviewed.   Constitutional:       " General: Vital signs are normal.      Appearance: She is well-developed and well-nourished.   HENT:      Head: Normocephalic.   Eyes:      Pupils: Pupils are equal, round, and reactive to light.   Neck:      Vascular: No JVD.   Cardiovascular:      Rate and Rhythm: Normal rate and regular rhythm.      Chest Wall: PMI is not displaced.      Pulses: Intact distal pulses.      Heart sounds: Normal heart sounds. No murmur heard.  No friction rub. No gallop.    Pulmonary:      Effort: Pulmonary effort is normal.      Breath sounds: Normal breath sounds. No wheezing or rales.   Abdominal:      General: Bowel sounds are normal.      Palpations: Abdomen is soft.   Musculoskeletal:         General: No edema.      Cervical back: Neck supple.   Neurological:      Mental Status: She is alert and oriented to person, place, and time.         Lab Results   Component Value Date    WBC 7.71 02/17/2022    HGB 13.9 02/17/2022    HCT 44.9 02/17/2022    MCV 89 02/17/2022     02/17/2022    CO2 23 02/17/2022    CREATININE 1.3 02/17/2022    CALCIUM 10.0 02/17/2022    ALBUMIN 4.0 02/17/2022    AST 17 02/17/2022    BNP 23 02/17/2022    ALT 12 02/17/2022       Lab Results   Component Value Date    INR 1.2 12/23/2019    INR 1.4 (H) 12/22/2019    INR 1.3 (H) 12/21/2019       BNP   Date Value Ref Range Status   09/24/2021 29 0 - 99 pg/mL Final     Comment:     Values of less than 100 pg/ml are consistent with non-CHF populations.   07/16/2021 19 0 - 99 pg/mL Final     Comment:     Values of less than 100 pg/ml are consistent with non-CHF populations.   04/26/2021 33 0 - 99 pg/mL Final     Comment:     Values of less than 100 pg/ml are consistent with non-CHF populations.       LD   Date Value Ref Range Status   12/16/2019 240 110 - 260 U/L Final     Comment:     Results are increased in hemolyzed samples.   12/15/2019 246 110 - 260 U/L Final     Comment:     Results are increased in hemolyzed samples.   12/14/2019 257 110 - 260 U/L Final      Comment:     Results are increased in hemolyzed samples.       Tacrolimus Lvl   Date Value Ref Range Status   09/24/2021 9.3 5.0 - 15.0 ng/mL Final     Comment:     Testing performed by a chemiluminescent microparticle   immunoassay on the [employer] System.    Refer to the normalized LC-MS/MS value to bam.         Assessment:     1. S/P orthotopic heart transplant    2. Immunodeficiency due to treatment with immunosuppressive medication    3. Class 1 obesity due to excess calories with serious comorbidity and body mass index (BMI) of 34.0 to 34.9 in adult    4. Mixed hyperlipidemia        Plan:   Doing really well  Complains of cough. DC lisinopril and start losartan 50 mg daily. BMP next week.   Lipids are up. Increase atorvastatin to 40 mg at night  Continue current immuno regimen   Dermatology for routine skin cancer screening   She is up to date with her mammogram     Return instructions as set forth by post transplant schedule or as needed:    Clinic: Return for labs and/or biopsy weekly the first month, every two weeks during month 2 and then monthly for the first year at the provider or coordinator's discretion. During the second year, return to clinic every 3 months. Post transplant year 3-5 return every 6 months. There will be a comprehensive post transplant evaluation every year that may include LHC/RHC/biopsy, stress test, echo, CXR, and other health screening exams.    In addition to the clinical assessment, I have ordered Allomap testing for this patient to assist in identification of moderate/severe acute cellular rejection (ACR) in a pt with stable Allograft function instead of endomyocardial biopsy.     Patient is reminded to call with any health changes as these can be early signs of transplant complications. Patient is advised to make sure any new medications or changes of old medications are discussed with a pharmacist or physician knowledgeable with transplant to avoid  rejection/drug toxicity related to significant drug interactions.    Patient advised that it is recommended that all transplanted patients, and their close contacts and household members receive Covid vaccination.    UNOS Patient Status  Functional Status: 90% - Able to carry on normal activity: minor symptoms of disease  Physical Capacity: No Limitations  Working for Income: Unknown    Renetta Chaudhari MD

## 2022-02-25 ENCOUNTER — IMMUNIZATION (OUTPATIENT)
Dept: PRIMARY CARE CLINIC | Facility: CLINIC | Age: 52
End: 2022-02-25
Payer: COMMERCIAL

## 2022-02-25 DIAGNOSIS — Z23 NEED FOR VACCINATION: Primary | ICD-10-CM

## 2022-02-25 PROCEDURE — 91300 COVID-19, MRNA, LNP-S, PF, 30 MCG/0.3 ML DOSE VACCINE: CPT | Mod: PBBFAC | Performed by: FAMILY MEDICINE

## 2022-03-15 ENCOUNTER — LAB VISIT (OUTPATIENT)
Dept: PRIMARY CARE CLINIC | Facility: CLINIC | Age: 52
End: 2022-03-15
Payer: COMMERCIAL

## 2022-03-15 DIAGNOSIS — Z94.1 S/P ORTHOTOPIC HEART TRANSPLANT: ICD-10-CM

## 2022-03-15 PROCEDURE — U0003 INFECTIOUS AGENT DETECTION BY NUCLEIC ACID (DNA OR RNA); SEVERE ACUTE RESPIRATORY SYNDROME CORONAVIRUS 2 (SARS-COV-2) (CORONAVIRUS DISEASE [COVID-19]), AMPLIFIED PROBE TECHNIQUE, MAKING USE OF HIGH THROUGHPUT TECHNOLOGIES AS DESCRIBED BY CMS-2020-01-R: HCPCS | Performed by: INTERNAL MEDICINE

## 2022-03-15 PROCEDURE — U0005 INFEC AGEN DETEC AMPLI PROBE: HCPCS | Performed by: INTERNAL MEDICINE

## 2022-03-16 LAB
SARS-COV-2 RNA RESP QL NAA+PROBE: NOT DETECTED
SARS-COV-2- CYCLE NUMBER: NORMAL

## 2022-03-18 ENCOUNTER — HOSPITAL ENCOUNTER (OUTPATIENT)
Facility: HOSPITAL | Age: 52
Discharge: HOME | End: 2022-03-18
Attending: INTERNAL MEDICINE | Admitting: INTERNAL MEDICINE
Payer: COMMERCIAL

## 2022-03-18 VITALS
HEIGHT: 68 IN | HEART RATE: 94 BPM | WEIGHT: 212 LBS | OXYGEN SATURATION: 97 % | SYSTOLIC BLOOD PRESSURE: 122 MMHG | DIASTOLIC BLOOD PRESSURE: 64 MMHG | RESPIRATION RATE: 16 BRPM | TEMPERATURE: 98 F | BODY MASS INDEX: 32.13 KG/M2

## 2022-03-18 DIAGNOSIS — Z94.1 HEART TRANSPLANTED: ICD-10-CM

## 2022-03-18 DIAGNOSIS — I21.4 NSTEMI (NON-ST ELEVATED MYOCARDIAL INFARCTION): ICD-10-CM

## 2022-03-18 LAB
ABO + RH BLD: NORMAL
ANION GAP SERPL CALC-SCNC: 12 MMOL/L (ref 8–16)
BASOPHILS # BLD AUTO: 0.05 K/UL (ref 0–0.2)
BASOPHILS NFR BLD: 0.7 % (ref 0–1.9)
BLD GP AB SCN CELLS X3 SERPL QL: NORMAL
BUN SERPL-MCNC: 29 MG/DL (ref 6–20)
CALCIUM SERPL-MCNC: 9.9 MG/DL (ref 8.7–10.5)
CHLORIDE SERPL-SCNC: 105 MMOL/L (ref 95–110)
CO2 SERPL-SCNC: 22 MMOL/L (ref 23–29)
CREAT SERPL-MCNC: 1.1 MG/DL (ref 0.5–1.4)
DIFFERENTIAL METHOD: ABNORMAL
EOSINOPHIL # BLD AUTO: 0.3 K/UL (ref 0–0.5)
EOSINOPHIL NFR BLD: 3.7 % (ref 0–8)
ERYTHROCYTE [DISTWIDTH] IN BLOOD BY AUTOMATED COUNT: 12.7 % (ref 11.5–14.5)
EST. GFR  (AFRICAN AMERICAN): >60 ML/MIN/1.73 M^2
EST. GFR  (NON AFRICAN AMERICAN): 57.9 ML/MIN/1.73 M^2
GLUCOSE SERPL-MCNC: 111 MG/DL (ref 70–110)
HCT VFR BLD AUTO: 40.9 % (ref 37–48.5)
HGB BLD-MCNC: 12.5 G/DL (ref 12–16)
IMM GRANULOCYTES # BLD AUTO: 0.05 K/UL (ref 0–0.04)
IMM GRANULOCYTES NFR BLD AUTO: 0.7 % (ref 0–0.5)
LYMPHOCYTES # BLD AUTO: 1.1 K/UL (ref 1–4.8)
LYMPHOCYTES NFR BLD: 14.7 % (ref 18–48)
MCH RBC QN AUTO: 27.5 PG (ref 27–31)
MCHC RBC AUTO-ENTMCNC: 30.6 G/DL (ref 32–36)
MCV RBC AUTO: 90 FL (ref 82–98)
MONOCYTES # BLD AUTO: 0.8 K/UL (ref 0.3–1)
MONOCYTES NFR BLD: 10.3 % (ref 4–15)
NEUTROPHILS # BLD AUTO: 5.1 K/UL (ref 1.8–7.7)
NEUTROPHILS NFR BLD: 69.9 % (ref 38–73)
NRBC BLD-RTO: 0 /100 WBC
PLATELET # BLD AUTO: 341 K/UL (ref 150–450)
PMV BLD AUTO: 9.6 FL (ref 9.2–12.9)
POTASSIUM SERPL-SCNC: 3.7 MMOL/L (ref 3.5–5.1)
RBC # BLD AUTO: 4.55 M/UL (ref 4–5.4)
SODIUM SERPL-SCNC: 139 MMOL/L (ref 136–145)
WBC # BLD AUTO: 7.3 K/UL (ref 3.9–12.7)

## 2022-03-18 PROCEDURE — 93458 L HRT ARTERY/VENTRICLE ANGIO: CPT | Mod: 26,,, | Performed by: INTERNAL MEDICINE

## 2022-03-18 PROCEDURE — 92978 PR IVUS, CORONARY, 1ST VESSEL: ICD-10-PCS | Mod: 26,LM,, | Performed by: INTERNAL MEDICINE

## 2022-03-18 PROCEDURE — 99152 MOD SED SAME PHYS/QHP 5/>YRS: CPT | Performed by: INTERNAL MEDICINE

## 2022-03-18 PROCEDURE — 93010 EKG 12-LEAD: ICD-10-PCS | Mod: ,,, | Performed by: INTERNAL MEDICINE

## 2022-03-18 PROCEDURE — 92978 ENDOLUMINL IVUS OCT C 1ST: CPT | Mod: LM | Performed by: INTERNAL MEDICINE

## 2022-03-18 PROCEDURE — 25000003 PHARM REV CODE 250: Performed by: INTERNAL MEDICINE

## 2022-03-18 PROCEDURE — 93005 ELECTROCARDIOGRAM TRACING: CPT

## 2022-03-18 PROCEDURE — C1887 CATHETER, GUIDING: HCPCS | Performed by: INTERNAL MEDICINE

## 2022-03-18 PROCEDURE — 93458 L HRT ARTERY/VENTRICLE ANGIO: CPT | Performed by: INTERNAL MEDICINE

## 2022-03-18 PROCEDURE — 93010 ELECTROCARDIOGRAM REPORT: CPT | Mod: ,,, | Performed by: INTERNAL MEDICINE

## 2022-03-18 PROCEDURE — 99152 PR MOD CONSCIOUS SEDATION, SAME PHYS, 5+ YRS, FIRST 15 MIN: ICD-10-PCS | Mod: ,,, | Performed by: INTERNAL MEDICINE

## 2022-03-18 PROCEDURE — 99152 MOD SED SAME PHYS/QHP 5/>YRS: CPT | Mod: ,,, | Performed by: INTERNAL MEDICINE

## 2022-03-18 PROCEDURE — 92979 ENDOLUMINL IVUS OCT C EA: CPT | Mod: LD | Performed by: INTERNAL MEDICINE

## 2022-03-18 PROCEDURE — 85025 COMPLETE CBC W/AUTO DIFF WBC: CPT | Performed by: INTERNAL MEDICINE

## 2022-03-18 PROCEDURE — 80048 BASIC METABOLIC PNL TOTAL CA: CPT | Performed by: INTERNAL MEDICINE

## 2022-03-18 PROCEDURE — 86901 BLOOD TYPING SEROLOGIC RH(D): CPT | Performed by: INTERNAL MEDICINE

## 2022-03-18 PROCEDURE — 92979 ENDOLUMINL IVUS OCT C EA: CPT | Mod: 26,LD,, | Performed by: INTERNAL MEDICINE

## 2022-03-18 PROCEDURE — 99153 MOD SED SAME PHYS/QHP EA: CPT | Performed by: INTERNAL MEDICINE

## 2022-03-18 PROCEDURE — 93458 PR CATH PLACE/CORON ANGIO, IMG SUPER/INTERP,W LEFT HEART VENTRICULOGRAPHY: ICD-10-PCS | Mod: 26,,, | Performed by: INTERNAL MEDICINE

## 2022-03-18 PROCEDURE — C1753 CATH, INTRAVAS ULTRASOUND: HCPCS | Performed by: INTERNAL MEDICINE

## 2022-03-18 PROCEDURE — C1769 GUIDE WIRE: HCPCS | Performed by: INTERNAL MEDICINE

## 2022-03-18 PROCEDURE — 63600175 PHARM REV CODE 636 W HCPCS: Performed by: INTERNAL MEDICINE

## 2022-03-18 PROCEDURE — 92978 ENDOLUMINL IVUS OCT C 1ST: CPT | Mod: 26,LM,, | Performed by: INTERNAL MEDICINE

## 2022-03-18 PROCEDURE — 92979 PR INTRAVASC CORONARY SO2,ADDN VESSEL: ICD-10-PCS | Mod: 26,LD,, | Performed by: INTERNAL MEDICINE

## 2022-03-18 PROCEDURE — C1894 INTRO/SHEATH, NON-LASER: HCPCS | Performed by: INTERNAL MEDICINE

## 2022-03-18 RX ORDER — SODIUM CHLORIDE 9 MG/ML
INJECTION, SOLUTION INTRAVENOUS CONTINUOUS
Status: ACTIVE | OUTPATIENT
Start: 2022-03-18 | End: 2022-03-18

## 2022-03-18 RX ORDER — ONDANSETRON 8 MG/1
8 TABLET, ORALLY DISINTEGRATING ORAL EVERY 8 HOURS PRN
Status: DISCONTINUED | OUTPATIENT
Start: 2022-03-18 | End: 2022-03-18 | Stop reason: HOSPADM

## 2022-03-18 RX ORDER — FENTANYL CITRATE 50 UG/ML
INJECTION, SOLUTION INTRAMUSCULAR; INTRAVENOUS
Status: DISCONTINUED | OUTPATIENT
Start: 2022-03-18 | End: 2022-03-18 | Stop reason: HOSPADM

## 2022-03-18 RX ORDER — HEPARIN SOD,PORCINE/0.9 % NACL 1000/500ML
INTRAVENOUS SOLUTION INTRAVENOUS
Status: DISCONTINUED | OUTPATIENT
Start: 2022-03-18 | End: 2022-03-18 | Stop reason: HOSPADM

## 2022-03-18 RX ORDER — MIDAZOLAM HYDROCHLORIDE 2 MG/2ML
INJECTION, SOLUTION INTRAMUSCULAR; INTRAVENOUS
Status: DISCONTINUED | OUTPATIENT
Start: 2022-03-18 | End: 2022-03-18 | Stop reason: HOSPADM

## 2022-03-18 RX ORDER — ACETAMINOPHEN 325 MG/1
650 TABLET ORAL EVERY 4 HOURS PRN
Status: DISCONTINUED | OUTPATIENT
Start: 2022-03-18 | End: 2022-03-18 | Stop reason: HOSPADM

## 2022-03-18 RX ORDER — HEPARIN SODIUM 1000 [USP'U]/ML
INJECTION, SOLUTION INTRAVENOUS; SUBCUTANEOUS
Status: DISCONTINUED | OUTPATIENT
Start: 2022-03-18 | End: 2022-03-18 | Stop reason: HOSPADM

## 2022-03-18 RX ORDER — NAPROXEN SODIUM 220 MG/1
81 TABLET, FILM COATED ORAL DAILY
Status: DISCONTINUED | OUTPATIENT
Start: 2022-03-18 | End: 2022-03-18 | Stop reason: HOSPADM

## 2022-03-18 RX ORDER — DIPHENHYDRAMINE HCL 50 MG
50 CAPSULE ORAL ONCE
Status: COMPLETED | OUTPATIENT
Start: 2022-03-18 | End: 2022-03-18

## 2022-03-18 RX ORDER — LIDOCAINE HYDROCHLORIDE 20 MG/ML
INJECTION, SOLUTION EPIDURAL; INFILTRATION; INTRACAUDAL; PERINEURAL
Status: DISCONTINUED | OUTPATIENT
Start: 2022-03-18 | End: 2022-03-18 | Stop reason: HOSPADM

## 2022-03-18 RX ADMIN — ASPIRIN 81 MG CHEWABLE TABLET 81 MG: 81 TABLET CHEWABLE at 09:03

## 2022-03-18 RX ADMIN — DIPHENHYDRAMINE HYDROCHLORIDE 50 MG: 50 CAPSULE ORAL at 08:03

## 2022-03-18 RX ADMIN — SODIUM CHLORIDE: 0.9 INJECTION, SOLUTION INTRAVENOUS at 08:03

## 2022-03-18 NOTE — PROGRESS NOTES
Report received from HELEN Szymanski. Patient s/p C. R radial vasc band intact, no bleeding or hematoma noted. Vss. IVF infusing. Post procedure protocol discussed with patient. Call light in reach.

## 2022-03-18 NOTE — DISCHARGE SUMMARY
[facility]  Cardiology  Discharge Summary      Patient Name: Deborah Navas  MRN: [id number]  Admission Date: 3/18/2022  Hospital Length of Stay: 0 days  Discharge Date and Time:  03/18/2022 10:28 AM  Attending Physician: Niall Lua MD    Discharging Provider: Yuli Enciso  Primary Care Physician: Valeria Verma MD    Procedure(s) (LRB):  LHC + IVUS for CAV surveillance    Hospital Course:  Mrs. Navas is a 52 y/o woman s/p OHT on 12/16/19.  Presents today for [facility] for surveillance.     [facility] Prelim Findings:  -No angiographic evidence of CAD  -IVUS with eccentric thickening in Lmain, pLAD and mLAD         Discharged Condition: good    Disposition:   Home    Follow Up:  Follow-up with Heart failure Transplant as scheduled    Patient Instructions:   Discharge Instructions and Care of Your Wrist After a Cardiac Catheterization Procedure Performed via the Radial Artery    For 24 Hours following the procedure:  Do not subject your affected hand/arm to any forceful movements (i.e. supporting weight when rising from a chair or bed)  Do not drive a car for 24 hours  The dressing (band-aid) on the puncture site may be removed after 24 hours and left open to air.  If there is minor oozing, you may apply another Band-aid and remove after 12 hours  You may shower on the day following your procedure.  Do not take a tub bath or submerge the puncture site in water for 3 days following the procedure    For 48 hours following the procedure:   Do not lift anything heavier than 3 to 5 pounds with the affected hand/arm  Do not operate a lawnmower, motorcycle, chainsaw, or all-terrain vehicle   Avoid excessive (extension/flexion) wrist movement (i.e. supporting weight when rising from a chair or bed, push-ups, lifting garage doors, etc.)  Do not engage in vigorous exercise (i.e. Tennis, Golf, Bowling) using the affected arm    If bleeding should occur following discharge:  Sit down and apply  firm pressure to the puncture site with your fingers for 10 minutes   If the bleeding stops, continue to sit quietly, keeping your wrist straight for 2 hours. Notify your physician as soon as possible   If bleeding does not stop after 10 minutes or if there is a large amount of bleeding or spurting, call 911 immediately.  Do not drive yourself to the hospital.     You should expect mild tingling in your hand and tenderness at the puncture site for up to 3 days.     Notify your physician if these symptoms persist or if you experience:  Change in color or temperature of the hand or arm  Redness, heat, or pus at the puncture site  Chills or fever greater than 101.0 F      Deborah Navas was given education on their disease process and medications.      Medications:  Reconciled Home Medications:      Medication List        ASK your doctor about these medications      ascorbic acid (vitamin C) 500 MG tablet  Commonly known as: VITAMIN C  Take 500 mg by mouth 3 (three) times daily.     aspirin 81 MG EC tablet  Commonly known as: ECOTRIN  Take 1 tablet (81 mg total) by mouth once daily.     atorvastatin 40 MG tablet  Commonly known as: LIPITOR  Take 1 tablet (40 mg total) by mouth every evening.     BIOTIN ORAL  Take 500 mg by mouth.     calcium carbonate-vitamin D3 1,000 mg-20 mcg (800 unit) Tab  Take 1 tablet by mouth once daily.     CLARITIN ORAL  Take by mouth once daily.     ferrous sulfate 325 (65 FE) MG EC tablet  Take 1 tablet (325 mg total) by mouth 3 (three) times daily with meals. And take 4 oz of OJ or at least 250 mg of Vit C with each dose     gabapentin 300 MG capsule  Commonly known as: NEURONTIN  Take 1 capsule (300 mg total) by mouth every evening.     losartan 50 MG tablet  Commonly known as: COZAAR  Take 1 tablet (50 mg total) by mouth once daily.     magnesium oxide 400 mg (241.3 mg magnesium) tablet  Commonly known as: MAG-OX  Take 1 tablet (400 mg total) by mouth once daily.      multivitamin capsule  Take 1 capsule by mouth once daily.     mycophenolate 250 mg Cap  Commonly known as: CELLCEPT  Take 4 capsules (1,000 mg total) by mouth 2 (two) times daily.     opw transplant care kit 1 Kit  Welcome to [facility].  This is your transplant care kit.     oxyCODONE-acetaminophen 5-325 mg per tablet  Commonly known as: PERCOCET  Take 1 tablet by mouth daily as needed for Pain.     pantoprazole 40 MG tablet  Commonly known as: PROTONIX  Take 1 tablet (40 mg total) by mouth once daily.     potassium chloride SA 20 MEQ tablet  Commonly known as: K-DUR,KLOR-CON  Take 1 tablet (20 mEq total) by mouth once daily.     predniSONE 5 MG tablet  Commonly known as: DELTASONE  Take 1 tablet (5 mg total) by mouth once daily.     senna-docusate 8.6-50 mg 8.6-50 mg per tablet  Commonly known as: PERICOLACE  Take 2 tablets by mouth 2 (two) times daily as needed for Constipation.     tacrolimus 1 MG Cap  Commonly known as: PROGRAF  Take 3 capsules (3 mg total) by mouth every 12 (twelve) hours.     venlafaxine 150 MG Cp24  Commonly known as: EFFEXOR-XR  Take 1 capsule (150 mg total) by mouth once daily.     VITAMIN D ORAL  Take by mouth every evening.     zolpidem 12.5 MG CR tablet  Commonly known as: AMBIEN CR  12.5 mg nightly.              Time spent on the discharge of patient: 30 minutes    Signed:  Yuli Enciso M.D.  Cardiology Fellow  [facility]

## 2022-03-18 NOTE — BRIEF OP NOTE
Brief Operative Note:    : Niall Lua MD     Referring Physician: Valeria Verma     All Operators: Surgeon(s):  Niall Lua MD     Preoperative Diagnosis: Heart transplanted [Z94.1]     Postop Diagnosis: Heart transplanted [Z94.1]    Treatments/Procedures: Procedure(s) (LRB):  Left heart cath (Left)      [facility] Prelim Findings:  -No angiographic evidence of CAD  -IVUS with eccentric thickening in Lmain, pLAD and mLAD Estimated Blood loss: 20 cc    Specimens removed: No    Recommendations:  1) Routine post-cath care  2) continue ASA and ARB  3) NS  250 cc/hour      Yuli Enciso     I have personally taken the history and examined this patient and agree with the resident's note as stated above.  All of the patient's questions were answered.

## 2022-03-18 NOTE — Clinical Note
60 ml of contrast were injected throughout the case. 140 mL of contrast was the total wasted during the case. 200 mL was the total amount used during the case.

## 2022-03-29 ENCOUNTER — PATIENT MESSAGE (OUTPATIENT)
Dept: TRANSPLANT | Facility: CLINIC | Age: 52
End: 2022-03-29
Payer: COMMERCIAL

## 2022-04-11 LAB
CREAT SERPL-MCNC: 0.8 MG/DL (ref 0.5–1.4)
SAMPLE: NORMAL

## 2022-04-28 ENCOUNTER — TELEPHONE (OUTPATIENT)
Dept: TRANSPLANT | Facility: CLINIC | Age: 52
End: 2022-04-28
Payer: COMMERCIAL

## 2022-04-28 DIAGNOSIS — T86.290 VASCULOPATHY OF CARDIAC ALLOGRAFT: ICD-10-CM

## 2022-04-28 DIAGNOSIS — Z94.1 STATUS POST HEART TRANSPLANT: ICD-10-CM

## 2022-04-28 NOTE — TELEPHONE ENCOUNTER
Chart reviewed with Dr. Chaudhari. Pt seen for her 2 year annual post transplant. Doing well.    Meds:  Tac 3/3  MMF 1000 BID  pred 5 (sarcoid)  Atorvastatin 20 mg  Lisinopril 5    Labs:  Tac 10.8 (goal 5-1)  Cr 1.3  WBC 7.71  BNP 23  Allomap 31  Allosure <0.12  Chol 242      CXR normal  ECHO 65%  LHC normal coronaries, IVUS Eccentric plaque LAD prox JAIRO 0.9, MID JAIRO 0.8    Plan: changed lisinopril to losartan for cough, BP well controlled with no cough. Atorvastatin increased to 40 mg (pt admitted to not taking statin for a month prior to labs). Checking tac level and pr/cr ratio for possible addition of MTOR for IVUS results. Pt to go to lab at [facility]  5/2/22. Pt will return to clinic on 6/15 with lab and ECHO.

## 2022-05-02 ENCOUNTER — LAB VISIT (OUTPATIENT)
Dept: LAB | Facility: HOSPITAL | Age: 52
End: 2022-05-02
Attending: INTERNAL MEDICINE
Payer: COMMERCIAL

## 2022-05-02 DIAGNOSIS — Z94.1 STATUS POST HEART TRANSPLANT: ICD-10-CM

## 2022-05-02 LAB
ALBUMIN SERPL BCP-MCNC: 4 G/DL (ref 3.5–5.2)
ALP SERPL-CCNC: 70 U/L (ref 55–135)
ALT SERPL W/O P-5'-P-CCNC: 18 U/L (ref 10–44)
ANION GAP SERPL CALC-SCNC: 11 MMOL/L (ref 8–16)
AST SERPL-CCNC: 18 U/L (ref 10–40)
BILIRUB SERPL-MCNC: 0.4 MG/DL (ref 0.1–1)
BUN SERPL-MCNC: 25 MG/DL (ref 6–20)
CALCIUM SERPL-MCNC: 9.8 MG/DL (ref 8.7–10.5)
CHLORIDE SERPL-SCNC: 107 MMOL/L (ref 95–110)
CO2 SERPL-SCNC: 22 MMOL/L (ref 23–29)
CREAT SERPL-MCNC: 1.1 MG/DL (ref 0.5–1.4)
EST. GFR  (AFRICAN AMERICAN): >60 ML/MIN/1.73 M^2
EST. GFR  (NON AFRICAN AMERICAN): 57.9 ML/MIN/1.73 M^2
GLUCOSE SERPL-MCNC: 99 MG/DL (ref 70–110)
POTASSIUM SERPL-SCNC: 3.8 MMOL/L (ref 3.5–5.1)
PROT SERPL-MCNC: 6.6 G/DL (ref 6–8.4)
SODIUM SERPL-SCNC: 140 MMOL/L (ref 136–145)

## 2022-05-02 PROCEDURE — 80053 COMPREHEN METABOLIC PANEL: CPT | Performed by: INTERNAL MEDICINE

## 2022-05-02 PROCEDURE — 80197 ASSAY OF TACROLIMUS: CPT | Performed by: INTERNAL MEDICINE

## 2022-05-02 PROCEDURE — 36415 COLL VENOUS BLD VENIPUNCTURE: CPT | Performed by: INTERNAL MEDICINE

## 2022-05-03 ENCOUNTER — PATIENT MESSAGE (OUTPATIENT)
Dept: TRANSPLANT | Facility: CLINIC | Age: 52
End: 2022-05-03
Payer: COMMERCIAL

## 2022-05-03 DIAGNOSIS — T86.290 CARDIAC ALLOGRAFT VASCULOPATHY: Primary | ICD-10-CM

## 2022-05-03 DIAGNOSIS — Z94.1 HEART REPLACED BY TRANSPLANT: ICD-10-CM

## 2022-05-03 LAB — TACROLIMUS BLD-MCNC: 8.5 NG/ML (ref 5–15)

## 2022-05-03 RX ORDER — TACROLIMUS 1 MG/1
CAPSULE ORAL
Qty: 90 CAPSULE | Refills: 11 | Status: SHIPPED | OUTPATIENT
Start: 2022-05-03 | End: 2022-05-03

## 2022-05-03 RX ORDER — SIROLIMUS 1 MG/1
1 TABLET, FILM COATED ORAL DAILY
Qty: 30 TABLET | Refills: 11 | Status: SHIPPED | OUTPATIENT
Start: 2022-05-03 | End: 2022-05-24

## 2022-05-03 NOTE — TELEPHONE ENCOUNTER
Reviewed Pr/cr ratio and LHC with Dr. Chaudhari. Pt now with CAV. Discussed with Tien WALTON, pharm D and Dr. Chaudhari, will transition pt to rapamune and off tac. Called and discussed changes with pt. Will load with 3 mg followed by 1 mg daily after that. Will decrease tac to 1/2. Check levels 7 days after starting rapamune. Goal of rapa 6-10.

## 2022-05-04 NOTE — NURSING
LVAD dressing change completed using sterile technique with soap and water by caregiver, sister Flavia. Flavia required minimal correction and maintained sterile field throughout procedure. Once she almost broke sterile field but stopped herself beforehand. Areas of correction include: sterile gloving and being mindful to where her sleeves are hanging, RN suggested to wear tank top or sleeveless shirts.. DLES is a 2 with scant/small drainage noted on the drain sponge, slight pink color, and scabby area noted around driveline. Tolerated without any complication. Suture remain intact, no tenderness noted. Patients caregiver IS NOT checked off on dressing change. I feel that she would improve from a couple more changes over the weekend.  RN gave Flavia supplies to practice techniques especially sterile gloving.   Yes

## 2022-05-09 ENCOUNTER — PATIENT MESSAGE (OUTPATIENT)
Dept: TRANSPLANT | Facility: CLINIC | Age: 52
End: 2022-05-09
Payer: COMMERCIAL

## 2022-05-09 ENCOUNTER — OFFICE VISIT (OUTPATIENT)
Dept: DERMATOLOGY | Facility: CLINIC | Age: 52
End: 2022-05-09
Payer: COMMERCIAL

## 2022-05-09 DIAGNOSIS — Z85.828 HISTORY OF NONMELANOMA SKIN CANCER: ICD-10-CM

## 2022-05-09 DIAGNOSIS — D18.00 HEMANGIOMA, UNSPECIFIED SITE: ICD-10-CM

## 2022-05-09 DIAGNOSIS — D22.9 MULTIPLE BENIGN NEVI: ICD-10-CM

## 2022-05-09 DIAGNOSIS — Z94.1 HEART TRANSPLANTED: Primary | ICD-10-CM

## 2022-05-09 DIAGNOSIS — Z12.83 SKIN CANCER SCREENING: Primary | ICD-10-CM

## 2022-05-09 DIAGNOSIS — L81.4 SOLAR LENTIGO: ICD-10-CM

## 2022-05-09 DIAGNOSIS — L82.1 SEBORRHEIC KERATOSES: ICD-10-CM

## 2022-05-09 PROCEDURE — 4010F ACE/ARB THERAPY RXD/TAKEN: CPT | Mod: CPTII,S$GLB,, | Performed by: STUDENT IN AN ORGANIZED HEALTH CARE EDUCATION/TRAINING PROGRAM

## 2022-05-09 PROCEDURE — 99213 PR OFFICE/OUTPT VISIT, EST, LEVL III, 20-29 MIN: ICD-10-PCS | Mod: S$GLB,,, | Performed by: STUDENT IN AN ORGANIZED HEALTH CARE EDUCATION/TRAINING PROGRAM

## 2022-05-09 PROCEDURE — 3044F PR MOST RECENT HEMOGLOBIN A1C LEVEL <7.0%: ICD-10-PCS | Mod: CPTII,S$GLB,, | Performed by: STUDENT IN AN ORGANIZED HEALTH CARE EDUCATION/TRAINING PROGRAM

## 2022-05-09 PROCEDURE — 1160F RVW MEDS BY RX/DR IN RCRD: CPT | Mod: CPTII,S$GLB,, | Performed by: STUDENT IN AN ORGANIZED HEALTH CARE EDUCATION/TRAINING PROGRAM

## 2022-05-09 PROCEDURE — 4010F PR ACE/ARB THEARPY RXD/TAKEN: ICD-10-PCS | Mod: CPTII,S$GLB,, | Performed by: STUDENT IN AN ORGANIZED HEALTH CARE EDUCATION/TRAINING PROGRAM

## 2022-05-09 PROCEDURE — 99213 OFFICE O/P EST LOW 20 MIN: CPT | Mod: S$GLB,,, | Performed by: STUDENT IN AN ORGANIZED HEALTH CARE EDUCATION/TRAINING PROGRAM

## 2022-05-09 PROCEDURE — 1159F MED LIST DOCD IN RCRD: CPT | Mod: CPTII,S$GLB,, | Performed by: STUDENT IN AN ORGANIZED HEALTH CARE EDUCATION/TRAINING PROGRAM

## 2022-05-09 PROCEDURE — 99999 PR PBB SHADOW E&M-EST. PATIENT-LVL IV: ICD-10-PCS | Mod: PBBFAC,,, | Performed by: STUDENT IN AN ORGANIZED HEALTH CARE EDUCATION/TRAINING PROGRAM

## 2022-05-09 PROCEDURE — 99999 PR PBB SHADOW E&M-EST. PATIENT-LVL IV: CPT | Mod: PBBFAC,,, | Performed by: STUDENT IN AN ORGANIZED HEALTH CARE EDUCATION/TRAINING PROGRAM

## 2022-05-09 PROCEDURE — 1160F PR REVIEW ALL MEDS BY PRESCRIBER/CLIN PHARMACIST DOCUMENTED: ICD-10-PCS | Mod: CPTII,S$GLB,, | Performed by: STUDENT IN AN ORGANIZED HEALTH CARE EDUCATION/TRAINING PROGRAM

## 2022-05-09 PROCEDURE — 1159F PR MEDICATION LIST DOCUMENTED IN MEDICAL RECORD: ICD-10-PCS | Mod: CPTII,S$GLB,, | Performed by: STUDENT IN AN ORGANIZED HEALTH CARE EDUCATION/TRAINING PROGRAM

## 2022-05-09 PROCEDURE — 3044F HG A1C LEVEL LT 7.0%: CPT | Mod: CPTII,S$GLB,, | Performed by: STUDENT IN AN ORGANIZED HEALTH CARE EDUCATION/TRAINING PROGRAM

## 2022-05-09 NOTE — PROGRESS NOTES
Patient Information  Name: Deborah Navsa  : 1970  MRN: [id number]     Referring Physician:  Dr. Montanez ref. provider found   Primary Care Physician:  Dr. Valeria Verma MD   Date of Visit: 2022      Subjective:       Deborah Navas is a 51 y.o. female who presents for   Chief Complaint   Patient presents with    Skin Check     HPI  Patient here for skin check.     Does patient have a personal hx of skin cancers? Yes, BCC  Does patient have family hx of melanoma?  no  Does patient have hx of strong sun exposure or tanning bed use in the past? No    This is a high risk patient here to check for the development of new lesions.      Patient was last seen:Visit date not found     Prior notes by myself reviewed.   Clinical documentation obtained by nursing staff reviewed.    Review of Systems   Skin: Negative for itching and rash.        Objective:    Physical Exam   Constitutional: She appears well-developed and well-nourished. No distress.   Neurological: She is alert and oriented to person, place, and time. She is not disoriented.   Psychiatric: She has a normal mood and affect.   Skin:   Areas Examined (abnormalities noted in diagram):   Scalp / Hair Palpated and Inspected  Head / Face Inspection Performed  Neck Inspection Performed  Chest / Axilla Inspection Performed  Abdomen Inspection Performed  Genitals / Buttocks / Groin Inspection Performed  Back Inspection Performed  RUE Inspected  LUE Inspection Performed  RLE Inspected  LLE Inspection Performed  Nails and Digits Inspection Performed                       Diagram Legend     Erythematous scaling macule/papule c/w actinic keratosis       Vascular papule c/w angioma      Pigmented verrucoid papule/plaque c/w seborrheic keratosis      Yellow umbilicated papule c/w sebaceous hyperplasia      Irregularly shaped tan macule c/w lentigo     1-2 mm smooth white papules consistent with Milia      Movable subcutaneous cyst with punctum  c/w epidermal inclusion cyst      Subcutaneous movable cyst c/w pilar cyst      Firm pink to brown papule c/w dermatofibroma      Pedunculated fleshy papule(s) c/w skin tag(s)      Evenly pigmented macule c/w junctional nevus     Mildly variegated pigmented, slightly irregular-bordered macule c/w mildly atypical nevus      Flesh colored to evenly pigmented papule c/w intradermal nevus       Pink pearly papule/plaque c/w basal cell carcinoma      Erythematous hyperkeratotic cursted plaque c/w SCC      Surgical scar with no sign of skin cancer recurrence      Open and closed comedones      Inflammatory papules and pustules      Verrucoid papule consistent consistent with wart     Erythematous eczematous patches and plaques     Dystrophic onycholytic nail with subungual debris c/w onychomycosis     Umbilicated papule    Erythematous-base heme-crusted tan verrucoid plaque consistent with inflamed seborrheic keratosis     Erythematous Silvery Scaling Plaque c/w Psoriasis     See annotation      No images are attached to the encounter or orders placed in the encounter.    [] Data reviewed  [] Independent review of test  [] Management discussed with another provider    Assessment / Plan:        Skin cancer screening/History of nonmelanoma skin cancer  Area of previous nonmelanoma examined. Site well healed with no signs of recurrence.    Total body skin examination performed today including at least 12 points as noted in physical examination. No lesions suspicious for malignancy noted.    Recommend daily sun protection/avoidance, use of at least SPF 30, broad spectrum sunscreen (OTC drug), skin self examinations, and routine physician surveillance to optimize early detection    Solar lentigo  This is a benign hyperpigmented sun induced lesion. Recommend daily sun protection/avoidance and use of at least SPF 30, broad spectrum sunscreen (OTC drug) will reduce the number of new lesions. Treatment of these benign lesions are  considered cosmetic.    Hemangioma, unspecified site  This is a benign vascular lesion. Reassurance given. No treatment required.     Multiple benign nevi  Discussed ABCDE's of nevi.  Monitor for new mole or moles that are becoming bigger, darker, irritated, or developing irregular borders. Brochure provided. Instructed patient to observe lesion(s) for changes and follow up in clinic if changes are noted. Patient to monitor skin at home for new or changing lesions.     Seborrheic keratoses  These are benign inherited growths without a malignant potential. Reassurance given to patient. No treatment is necessary.                  LOS NUMBER AND COMPLEXITY OF PROBLEMS    COMPLEXITY OF DATA RISK TOTAL TIME (m)   53646  [id number] [] 1 self-limited or minor problem [x] Minimal to none [] No treatment recommended or patient to monitor 15-29  10-19   93130  [id number] Low  [] 2 or > self limited or minor problems  [] 1 stable chronic illness  [] 1 acute, uncomplicated illness or injury Limited (2)  [] Prior external notes from each unique source  [] Review result of each unique test  [] Order each unique test [x]  Low  OTC medications, minor skin biopsy 30-44  20-29   76303  17309 Moderate  []  1 or > chronic illness with progression, exacerbation or SE of treatment  [x]  2 or more stable chronic illnesses  []  1 acute illness with systemic symptoms  []  1 acute complicated injury  []  1 undiagnosed new problem with uncertain prognosis Moderate (1/3 below)  []  3 or more data items        *Now includes assessment requiring independent historian  []  Independent interpretation of a test  []  Discuss management/test with another provider Moderate  []  Prescription drug mgmt  []  Minor surgery with risk discussed  []  Mgmt limited by social determinates 45-59  30-39   23615  96251 High  []  1 or more chronic illness with severe exacerbation, progression or SE of treatment  []  1 acute or chronic illness/injury that poses a threat to  life or bodily function Extensive (2/3 below)  []  3 or more data items        *Now includes assessment requiring independent historian.  []  Independent interpretation of a test  []  Discuss management/test with another provider High  []  Major surgery with risk discussed  []  Drug therapy requiring intensive monitoring for toxicity  []  Hospitalization  []  Decision for DNR 60-74  40-54      Follow up in about 1 year (around 5/9/2023).    Ana Grimaldo MD, FAAD  [facility]

## 2022-05-13 ENCOUNTER — LAB VISIT (OUTPATIENT)
Dept: LAB | Facility: HOSPITAL | Age: 52
End: 2022-05-13
Attending: INTERNAL MEDICINE
Payer: COMMERCIAL

## 2022-05-13 DIAGNOSIS — Z94.1 HEART TRANSPLANTED: ICD-10-CM

## 2022-05-13 DIAGNOSIS — Z94.1 STATUS POST HEART TRANSPLANT: ICD-10-CM

## 2022-05-13 PROCEDURE — 80195 ASSAY OF SIROLIMUS: CPT | Performed by: INTERNAL MEDICINE

## 2022-05-13 PROCEDURE — 80197 ASSAY OF TACROLIMUS: CPT | Performed by: INTERNAL MEDICINE

## 2022-05-13 PROCEDURE — 36415 COLL VENOUS BLD VENIPUNCTURE: CPT | Performed by: INTERNAL MEDICINE

## 2022-05-14 LAB
SIROLIMUS BLD-MCNC: 3.5 NG/ML (ref 4–20)
TACROLIMUS BLD-MCNC: 8 NG/ML (ref 5–15)

## 2022-05-16 ENCOUNTER — PATIENT MESSAGE (OUTPATIENT)
Dept: TRANSPLANT | Facility: CLINIC | Age: 52
End: 2022-05-16
Payer: COMMERCIAL

## 2022-05-16 DIAGNOSIS — T86.290 CARDIAC ALLOGRAFT VASCULOPATHY: ICD-10-CM

## 2022-05-16 DIAGNOSIS — Z94.1 HEART REPLACED BY TRANSPLANT: ICD-10-CM

## 2022-05-16 NOTE — TELEPHONE ENCOUNTER
REviewed tac and rapa levels with Dr. Chaudhari. Tac 8.0 on 1/2, goal 3-6. Rapa 3.5 on 1 mg, goal 5-10. Will decrease tac to 1/1 and leave rapap at 1 mg. Will repeat labs in a week.

## 2022-05-18 RX ORDER — TACROLIMUS 1 MG/1
CAPSULE ORAL
Qty: 60 CAPSULE | Refills: 11 | Status: SHIPPED | OUTPATIENT
Start: 2022-05-18 | End: 2022-06-15 | Stop reason: ALTCHOICE

## 2022-05-23 ENCOUNTER — TELEPHONE (OUTPATIENT)
Dept: TRANSPLANT | Facility: CLINIC | Age: 52
End: 2022-05-23
Payer: COMMERCIAL

## 2022-05-23 ENCOUNTER — LAB VISIT (OUTPATIENT)
Dept: LAB | Facility: HOSPITAL | Age: 52
End: 2022-05-23
Attending: INTERNAL MEDICINE
Payer: COMMERCIAL

## 2022-05-23 DIAGNOSIS — Z94.1 STATUS POST HEART TRANSPLANT: ICD-10-CM

## 2022-05-23 DIAGNOSIS — Z94.1 HEART TRANSPLANTED: ICD-10-CM

## 2022-05-23 PROCEDURE — 80195 ASSAY OF SIROLIMUS: CPT | Performed by: INTERNAL MEDICINE

## 2022-05-23 PROCEDURE — 36415 COLL VENOUS BLD VENIPUNCTURE: CPT | Performed by: INTERNAL MEDICINE

## 2022-05-23 PROCEDURE — 80197 ASSAY OF TACROLIMUS: CPT | Performed by: INTERNAL MEDICINE

## 2022-05-24 DIAGNOSIS — T86.290 CARDIAC ALLOGRAFT VASCULOPATHY: ICD-10-CM

## 2022-05-24 DIAGNOSIS — Z94.1 HEART REPLACED BY TRANSPLANT: ICD-10-CM

## 2022-05-24 LAB
SIROLIMUS BLD-MCNC: 4 NG/ML (ref 4–20)
TACROLIMUS BLD-MCNC: 5 NG/ML (ref 5–15)

## 2022-05-24 RX ORDER — SIROLIMUS 1 MG/1
2 TABLET, FILM COATED ORAL DAILY
Qty: 60 TABLET | Refills: 11 | Status: SHIPPED | OUTPATIENT
Start: 2022-05-24 | End: 2022-06-15

## 2022-05-24 NOTE — TELEPHONE ENCOUNTER
Reviewed repeat sirolimus level wit Dr. Chaudhari. Repeat level still low at 4.0, goal 5-10. Will increase sirolimus to 2 mg and recheck labs next week. Pt can go Tuesday at [facility].

## 2022-05-30 ENCOUNTER — TELEPHONE (OUTPATIENT)
Dept: TRANSPLANT | Facility: CLINIC | Age: 52
End: 2022-05-30
Payer: COMMERCIAL

## 2022-05-31 ENCOUNTER — LAB VISIT (OUTPATIENT)
Dept: LAB | Facility: HOSPITAL | Age: 52
End: 2022-05-31
Attending: INTERNAL MEDICINE
Payer: COMMERCIAL

## 2022-05-31 DIAGNOSIS — Z94.1 HEART TRANSPLANTED: ICD-10-CM

## 2022-05-31 DIAGNOSIS — Z94.1 STATUS POST HEART TRANSPLANT: ICD-10-CM

## 2022-05-31 PROCEDURE — 80197 ASSAY OF TACROLIMUS: CPT | Performed by: INTERNAL MEDICINE

## 2022-05-31 PROCEDURE — 80195 ASSAY OF SIROLIMUS: CPT | Performed by: INTERNAL MEDICINE

## 2022-05-31 PROCEDURE — 36415 COLL VENOUS BLD VENIPUNCTURE: CPT | Performed by: INTERNAL MEDICINE

## 2022-06-01 ENCOUNTER — TELEPHONE (OUTPATIENT)
Dept: TRANSPLANT | Facility: CLINIC | Age: 52
End: 2022-06-01
Payer: COMMERCIAL

## 2022-06-01 ENCOUNTER — PATIENT MESSAGE (OUTPATIENT)
Dept: TRANSPLANT | Facility: CLINIC | Age: 52
End: 2022-06-01
Payer: COMMERCIAL

## 2022-06-01 LAB
SIROLIMUS BLD-MCNC: 4.8 NG/ML (ref 4–20)
TACROLIMUS BLD-MCNC: 4.9 NG/ML (ref 5–15)

## 2022-06-07 ENCOUNTER — PATIENT MESSAGE (OUTPATIENT)
Dept: TRANSPLANT | Facility: CLINIC | Age: 52
End: 2022-06-07
Payer: COMMERCIAL

## 2022-06-09 NOTE — PROGRESS NOTES
INR at goal. Medications reviewed and no changes noted to necessitate warfarin adjustment.   See calendar.        
Patient advised of dose instructions and verbalized understanding.  HH faxed   
No

## 2022-06-10 ENCOUNTER — LAB VISIT (OUTPATIENT)
Dept: LAB | Facility: HOSPITAL | Age: 52
End: 2022-06-10
Attending: FAMILY MEDICINE
Payer: COMMERCIAL

## 2022-06-10 DIAGNOSIS — Z94.1 HEART TRANSPLANTED: ICD-10-CM

## 2022-06-10 PROCEDURE — 80195 ASSAY OF SIROLIMUS: CPT | Performed by: INTERNAL MEDICINE

## 2022-06-10 PROCEDURE — 36415 COLL VENOUS BLD VENIPUNCTURE: CPT | Performed by: INTERNAL MEDICINE

## 2022-06-11 LAB — SIROLIMUS BLD-MCNC: 10.3 NG/ML (ref 4–20)

## 2022-06-15 ENCOUNTER — NUTRITION (OUTPATIENT)
Dept: TRANSPLANT | Facility: CLINIC | Age: 52
End: 2022-06-15
Payer: COMMERCIAL

## 2022-06-15 ENCOUNTER — HOSPITAL ENCOUNTER (OUTPATIENT)
Dept: CARDIOLOGY | Facility: HOSPITAL | Age: 52
Discharge: HOME | End: 2022-06-15
Attending: INTERNAL MEDICINE
Payer: COMMERCIAL

## 2022-06-15 ENCOUNTER — PATIENT MESSAGE (OUTPATIENT)
Dept: TRANSPLANT | Facility: CLINIC | Age: 52
End: 2022-06-15

## 2022-06-15 ENCOUNTER — CLINICAL SUPPORT (OUTPATIENT)
Dept: TRANSPLANT | Facility: CLINIC | Age: 52
End: 2022-06-15
Payer: COMMERCIAL

## 2022-06-15 VITALS
BODY MASS INDEX: 32.74 KG/M2 | WEIGHT: 216 LBS | SYSTOLIC BLOOD PRESSURE: 112 MMHG | HEART RATE: 96 BPM | HEIGHT: 68 IN | DIASTOLIC BLOOD PRESSURE: 60 MMHG

## 2022-06-15 VITALS
BODY MASS INDEX: 33.76 KG/M2 | WEIGHT: 222 LBS | SYSTOLIC BLOOD PRESSURE: 153 MMHG | DIASTOLIC BLOOD PRESSURE: 86 MMHG | HEART RATE: 106 BPM

## 2022-06-15 DIAGNOSIS — Z94.1 HEART REPLACED BY TRANSPLANT: ICD-10-CM

## 2022-06-15 DIAGNOSIS — Z94.1 STATUS POST HEART TRANSPLANT: ICD-10-CM

## 2022-06-15 DIAGNOSIS — Z94.1 HEART TRANSPLANTED: Primary | ICD-10-CM

## 2022-06-15 DIAGNOSIS — T86.290 CARDIAC ALLOGRAFT VASCULOPATHY: ICD-10-CM

## 2022-06-15 DIAGNOSIS — R63.5 WEIGHT GAIN: Primary | ICD-10-CM

## 2022-06-15 LAB
ASCENDING AORTA: 2.65 CM
AV INDEX (PROSTH): 0.66
AV MEAN GRADIENT: 5 MMHG
AV PEAK GRADIENT: 10 MMHG
AV VALVE AREA: 2.12 CM2
AV VELOCITY RATIO: 0.52
BSA FOR ECHO PROCEDURE: 2.17 M2
CV ECHO LV RWT: 0.31 CM
DOP CALC AO PEAK VEL: 1.59 M/S
DOP CALC AO VTI: 23.7 CM
DOP CALC LVOT AREA: 3.2 CM2
DOP CALC LVOT DIAMETER: 2.02 CM
DOP CALC LVOT PEAK VEL: 0.83 M/S
DOP CALC LVOT STROKE VOLUME: 50.19 CM3
DOP CALCLVOT PEAK VEL VTI: 15.67 CM
E WAVE DECELERATION TIME: 165.08 MSEC
E/A RATIO: 1.16
E/E' RATIO: 7.52 M/S
ECHO LV POSTERIOR WALL: 0.68 CM (ref 0.6–1.1)
EJECTION FRACTION: 65 %
FRACTIONAL SHORTENING: 37 % (ref 28–44)
INTERVENTRICULAR SEPTUM: 0.66 CM (ref 0.6–1.1)
IVRT: 74.22 MSEC
LEFT INTERNAL DIMENSION IN SYSTOLE: 2.78 CM (ref 2.1–4)
LEFT VENTRICLE DIASTOLIC VOLUME INDEX: 41.38 ML/M2
LEFT VENTRICLE DIASTOLIC VOLUME: 87.32 ML
LEFT VENTRICLE MASS INDEX: 41 G/M2
LEFT VENTRICLE SYSTOLIC VOLUME INDEX: 13.7 ML/M2
LEFT VENTRICLE SYSTOLIC VOLUME: 29.01 ML
LEFT VENTRICULAR INTERNAL DIMENSION IN DIASTOLE: 4.39 CM (ref 3.5–6)
LEFT VENTRICULAR MASS: 86.74 G
LV LATERAL E/E' RATIO: 6.08 M/S
LV SEPTAL E/E' RATIO: 9.88 M/S
MV PEAK A VEL: 0.68 M/S
MV PEAK E VEL: 0.79 M/S
MV STENOSIS PRESSURE HALF TIME: 47.87 MS
MV VALVE AREA P 1/2 METHOD: 4.6 CM2
RA PRESSURE: 3 MMHG
RIGHT VENTRICULAR END-DIASTOLIC DIMENSION: 3.83 CM
RV TISSUE DOPPLER FREE WALL SYSTOLIC VELOCITY 1 (APICAL 4 CHAMBER VIEW): 8.5 CM/S
SINUS: 2.96 CM
STJ: 2.45 CM
TDI LATERAL: 0.13 M/S
TDI SEPTAL: 0.08 M/S
TDI: 0.11 M/S
TRICUSPID ANNULAR PLANE SYSTOLIC EXCURSION: 1.38 CM

## 2022-06-15 PROCEDURE — 99999 PR PBB SHADOW E&M-EST. PATIENT-LVL III: ICD-10-PCS | Mod: PBBFAC,,,

## 2022-06-15 PROCEDURE — 99999 PR PBB SHADOW E&M-EST. PATIENT-LVL I: CPT | Mod: PBBFAC,,,

## 2022-06-15 PROCEDURE — 93306 TTE W/DOPPLER COMPLETE: CPT

## 2022-06-15 PROCEDURE — 99999 PR PBB SHADOW E&M-EST. PATIENT-LVL III: CPT | Mod: PBBFAC,,,

## 2022-06-15 PROCEDURE — 93306 TTE W/DOPPLER COMPLETE: CPT | Mod: 26,,, | Performed by: INTERNAL MEDICINE

## 2022-06-15 PROCEDURE — 99999 PR PBB SHADOW E&M-EST. PATIENT-LVL I: ICD-10-PCS | Mod: PBBFAC,,,

## 2022-06-15 PROCEDURE — 93306 ECHO (CUPID ONLY): ICD-10-PCS | Mod: 26,,, | Performed by: INTERNAL MEDICINE

## 2022-06-15 RX ORDER — MONTELUKAST SODIUM 10 MG/1
10 TABLET ORAL NIGHTLY
COMMUNITY

## 2022-06-15 RX ORDER — SIROLIMUS 1 MG/1
3 TABLET, FILM COATED ORAL DAILY
Qty: 90 TABLET | Refills: 11 | Status: SHIPPED | OUTPATIENT
Start: 2022-06-15 | End: 2022-07-12 | Stop reason: SINTOL

## 2022-06-15 NOTE — PROGRESS NOTES
Met with pt in clinic doing well. Complains of feeling tired. Has been exercising on elipical 5 mins a day. Feels she has been eating better but gained 4 lbs since Feb. WBC low at 3.14. will get CMV PCR and IGG in the mean time. REviewed with Dr. james who is waiting to review all labs.

## 2022-06-15 NOTE — PROGRESS NOTES
TRANSPLANT NUTRITIONAL ASSESSMENT    Referring Provider: Valeria Verma MD    Reason for Visit: Hyperlipidemia and Weight loss education    Age: 51 y.o.  Sex: female    Patient Active Problem List   Diagnosis    Knee pain    Pes anserine bursitis    Multinodular goiter    Abnormal CT scan    Stage 3a chronic kidney disease    Heart transplanted    Immunocompromised state due to drug therapy    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    Restrictive cardiomyopathy post heart transplant    Thenar muscle atrophy of left hand    Thenar muscle atrophy of right hand    Cubital tunnel syndrome, bilateral    Cubital tunnel syndrome    Hyperlipidemia LDL goal <100    Obesity due to excess calories with serious comorbidity    Thyroid nodule    Vitamin D deficiency disease    Osteopenia    Ulnar nerve compression at multiple levels, left    S/P orthotopic heart transplant    Immunodeficiency due to treatment with immunosuppressive medication     Past Medical History:   Diagnosis Date    Basal cell carcinoma     Fractures     History of ventricular fibrillation 09/04/2019    History of ventricular tachycardia 09/04/2019    Hyperlipidemia     Immunodeficiency due to treatment with immunosuppressive medication     Migraine     Multinodular goiter 09/05/2019    Osteoarthritis     Restrictive cardiomyopathy post heart transplant     Stage 3 chronic kidney disease      Lab Results   Component Value Date     06/15/2022    K 3.0 (L) 06/15/2022    K 4.5 11/01/2019    PHOS 4.1 06/03/2020    MG 2.0 06/15/2022    CHOL 179 06/15/2022    HDL 54 06/15/2022    TRIG 179 (H) 06/15/2022    ALBUMIN 3.9 06/15/2022    PREALBUMIN 27 12/16/2019    HGBA1C 5.9 (H) 02/17/2022    CALCIUM 9.8 06/15/2022    CALCIUM 8.5 11/01/2019     Other Pertinent Labs: BUN: 23 (H)    Current Outpatient Medications   Medication Sig    ascorbic acid, vitamin C, (VITAMIN C) 500 MG tablet Take 500 mg by  mouth 3 (three) times daily.    aspirin (ECOTRIN) 81 MG EC tablet Take 1 tablet (81 mg total) by mouth once daily.    atorvastatin (LIPITOR) 40 MG tablet Take 1 tablet (40 mg total) by mouth every evening.    BIOTIN ORAL Take 500 mg by mouth.    calcium carbonate-vitamin D3 1,000 mg(2,500 mg)-800 unit Tab Take 1 tablet by mouth once daily.    ergocalciferol, vitamin D2, (VITAMIN D ORAL) Take by mouth every evening.    ferrous sulfate 325 (65 FE) MG EC tablet Take 1 tablet (325 mg total) by mouth 3 (three) times daily with meals. And take 4 oz of OJ or at least 250 mg of Vit C with each dose    gabapentin (NEURONTIN) 300 MG capsule Take 1 capsule (300 mg total) by mouth every evening.    loratadine (CLARITIN ORAL) Take by mouth once daily.    losartan (COZAAR) 50 MG tablet Take 1 tablet (50 mg total) by mouth once daily.    magnesium oxide (MAG-OX) 400 mg (241.3 mg magnesium) tablet Take 1 tablet (400 mg total) by mouth once daily.    montelukast (SINGULAIR) 10 mg tablet Take 10 mg by mouth once daily.    multivitamin capsule Take 1 capsule by mouth once daily.    mycophenolate (CELLCEPT) 250 mg Cap Take 4 capsules (1,000 mg total) by mouth 2 (two) times daily.    w transplant care kit Welcome to [facility].  This is your transplant care kit.    oxyCODONE-acetaminophen (PERCOCET) 5-325 mg per tablet Take 1 tablet by mouth daily as needed for Pain.    pantoprazole (PROTONIX) 40 MG tablet Take 1 tablet (40 mg total) by mouth once daily.    potassium chloride SA (K-DUR,KLOR-CON) 20 MEQ tablet Take 1 tablet (20 mEq total) by mouth once daily.    predniSONE (DELTASONE) 5 MG tablet Take 1 tablet (5 mg total) by mouth once daily.    senna-docusate 8.6-50 mg (PERICOLACE) 8.6-50 mg per tablet Take 2 tablets by mouth 2 (two) times daily as needed for Constipation. (Patient taking differently: Take 1 tablet by mouth once daily.)    sirolimus (RAPAMUNE) 1 MG Tab Take 2 tablets (2 mg total)  "by mouth once daily. (Patient taking differently: Take 3 mg by mouth once daily.)    venlafaxine (EFFEXOR-XR) 150 MG Cp24 Take 1 capsule (150 mg total) by mouth once daily.    zolpidem (AMBIEN CR) 12.5 MG CR tablet 12.5 mg nightly.     No current facility-administered medications for this visit.     Facility-Administered Medications Ordered in Other Visits   Medication    fentaNYL injection 25 mcg    midazolam (VERSED) 1 mg/mL injection 0.5 mg     Allergies: Adhesive; Latex, natural rubber; and Codeine    Ht Readings from Last 1 Encounters:   06/15/22 5' 8" (1.727 m)     Wt Readings from Last 1 Encounters:   06/15/22 100.7 kg (222 lb 0.1 oz)      BMI: 33. 76  kg/m²  Usual Weight: 98 kg (215 lbs)  Weight Change/Time: 7 lbs / 2 months   Current Diet: MEERA   Appetite/Current Intake: good   Exercise/Physical Activity: 5 minutes on elliptical per day    Nutritional/Herbal Supplements: none   Potential Food/Medication Interactions: atorvastatin - grapefruit  Chewing/Swallowing Problems: none   Symptoms: none  Assessment of Lab Values: pt has changed diet eliminating all red meat but has been eating more dairy the past 2 months   Support System: friends/family    ABW: 72.95 kg (160.5 lbs)  Estimated Kcal Need: 1823 - 2043 kcals (25 - 28 kcal/kg ABW))  Estimated Protein Need: 80 g (0.8g/kg CBW)    Nutritional History: pt eats cereal (raisin brand or fiber cereal) every morning with 2% milk. Pt will snack on yogurt or fruit while working, or will be some sort of carb source snack. Dinner is the [employer] prepared meals that are usually a vegetarian meal (rice, mushrooms, and broccoli stir schmidt) or fish (salmon or tilapia) with a starch and vegetable. Pt is drinking coke zeros and water throughout the day.     Nutritional Diagnoses  Problem: overweight/obesity  Etiology: inadequate energy intake   Symptoms: nutritional history     Educational Need? yes  Barriers: none identified  Discussed with: patient  Interventions: " Patient taught nutrition information regarding Weight loss education.  Pt was instructed to cont her elliptical runs and progressively build up. Pt was instructed to add walnuts and fish oil into diet to aid in lowering triglyceride levels. Instructed pt to be mindful of saturated fat intake   Goals/Recommendations: diet adherence, small frequent meals and snacks and gradual weight loss  Actions Taken: instruct/provide written information  Strategies Used: problem solving, goal setting, motivational interviewing  Patient and/or family comprehend instructions: yes , adherence expected  Outcome: Verbalizes understanding  Monitoring: Contact information provided, will f/u in clinic and communicate with the care team as needed.     Counseling Time: 15 minutes

## 2022-06-29 ENCOUNTER — PATIENT MESSAGE (OUTPATIENT)
Dept: TRANSPLANT | Facility: CLINIC | Age: 52
End: 2022-06-29
Payer: COMMERCIAL

## 2022-07-08 ENCOUNTER — TELEPHONE (OUTPATIENT)
Dept: TRANSPLANT | Facility: CLINIC | Age: 52
End: 2022-07-08
Payer: COMMERCIAL

## 2022-07-08 NOTE — TELEPHONE ENCOUNTER
Discussed with Tien WALTON, pharm D side effect of nausea pt has been having since starting sirolimus. Nausea is not a usual side effect of sirolimus. Discussed options of stopping sirolimus and restarting tac, at previous dose, to see if nausea goes away. If it does, may change sirolimus to everolimus for CAV prevention. Tien suggested retrying sirolimus to see if nausea restarts. If it does, then can start everolimus. Dr. Prieto agrees with plan of above.

## 2022-07-12 RX ORDER — TACROLIMUS 1 MG/1
3 CAPSULE ORAL EVERY 12 HOURS
Qty: 180 CAPSULE | Refills: 11 | Status: SHIPPED | OUTPATIENT
Start: 2022-07-12 | End: 2022-07-21

## 2022-07-12 NOTE — TELEPHONE ENCOUNTER
Pt sent message today that nausea has resolved with stopping rapamune. She is going out of town for a family death. Will discuss if we should start everolimus in place of rapa. Pt needed new script for tac, since she is almost out.

## 2022-07-20 ENCOUNTER — PATIENT MESSAGE (OUTPATIENT)
Dept: TRANSPLANT | Facility: CLINIC | Age: 52
End: 2022-07-20
Payer: COMMERCIAL

## 2022-07-26 ENCOUNTER — TELEPHONE (OUTPATIENT)
Dept: TRANSPLANT | Facility: CLINIC | Age: 52
End: 2022-07-26
Payer: COMMERCIAL

## 2022-07-26 DIAGNOSIS — Z94.1 HEART TRANSPLANTED: Primary | ICD-10-CM

## 2022-07-26 NOTE — TELEPHONE ENCOUNTER
Pt called to inform me she restarted sirolimus on her own to see if she has any interaction. She started last Friday and has not had any side effects. She will check labs Friday at [facility] for levels.

## 2022-07-29 ENCOUNTER — LAB VISIT (OUTPATIENT)
Dept: LAB | Facility: HOSPITAL | Age: 52
End: 2022-07-29
Attending: INTERNAL MEDICINE
Payer: COMMERCIAL

## 2022-07-29 DIAGNOSIS — Z94.1 STATUS POST HEART TRANSPLANT: ICD-10-CM

## 2022-07-29 DIAGNOSIS — Z94.1 HEART TRANSPLANTED: ICD-10-CM

## 2022-07-29 LAB
ANION GAP SERPL CALC-SCNC: 11 MMOL/L (ref 8–16)
BASOPHILS # BLD AUTO: 0.06 K/UL (ref 0–0.2)
BASOPHILS NFR BLD: 1 % (ref 0–1.9)
BNP SERPL-MCNC: 40 PG/ML (ref 0–99)
BUN SERPL-MCNC: 24 MG/DL (ref 6–20)
CALCIUM SERPL-MCNC: 9.9 MG/DL (ref 8.7–10.5)
CHLORIDE SERPL-SCNC: 107 MMOL/L (ref 95–110)
CO2 SERPL-SCNC: 27 MMOL/L (ref 23–29)
CREAT SERPL-MCNC: 1 MG/DL (ref 0.5–1.4)
DIFFERENTIAL METHOD: ABNORMAL
EOSINOPHIL # BLD AUTO: 0.3 K/UL (ref 0–0.5)
EOSINOPHIL NFR BLD: 4 % (ref 0–8)
ERYTHROCYTE [DISTWIDTH] IN BLOOD BY AUTOMATED COUNT: 13 % (ref 11.5–14.5)
EST. GFR  (AFRICAN AMERICAN): >60 ML/MIN/1.73 M^2
EST. GFR  (NON AFRICAN AMERICAN): >60 ML/MIN/1.73 M^2
GLUCOSE SERPL-MCNC: 102 MG/DL (ref 70–110)
HCT VFR BLD AUTO: 41.1 % (ref 37–48.5)
HGB BLD-MCNC: 12.5 G/DL (ref 12–16)
IMM GRANULOCYTES # BLD AUTO: 0.02 K/UL (ref 0–0.04)
IMM GRANULOCYTES NFR BLD AUTO: 0.3 % (ref 0–0.5)
LYMPHOCYTES # BLD AUTO: 1.4 K/UL (ref 1–4.8)
LYMPHOCYTES NFR BLD: 22.9 % (ref 18–48)
MCH RBC QN AUTO: 26.8 PG (ref 27–31)
MCHC RBC AUTO-ENTMCNC: 30.4 G/DL (ref 32–36)
MCV RBC AUTO: 88 FL (ref 82–98)
MONOCYTES # BLD AUTO: 0.7 K/UL (ref 0.3–1)
MONOCYTES NFR BLD: 10.5 % (ref 4–15)
NEUTROPHILS # BLD AUTO: 3.8 K/UL (ref 1.8–7.7)
NEUTROPHILS NFR BLD: 61.3 % (ref 38–73)
NRBC BLD-RTO: 0 /100 WBC
PLATELET # BLD AUTO: 332 K/UL (ref 150–450)
PMV BLD AUTO: 9.7 FL (ref 9.2–12.9)
POTASSIUM SERPL-SCNC: 4.6 MMOL/L (ref 3.5–5.1)
RBC # BLD AUTO: 4.67 M/UL (ref 4–5.4)
SODIUM SERPL-SCNC: 145 MMOL/L (ref 136–145)
WBC # BLD AUTO: 6.19 K/UL (ref 3.9–12.7)

## 2022-07-29 PROCEDURE — 36415 COLL VENOUS BLD VENIPUNCTURE: CPT | Performed by: INTERNAL MEDICINE

## 2022-07-29 PROCEDURE — 80048 BASIC METABOLIC PNL TOTAL CA: CPT | Performed by: INTERNAL MEDICINE

## 2022-07-29 PROCEDURE — 83880 ASSAY OF NATRIURETIC PEPTIDE: CPT | Performed by: INTERNAL MEDICINE

## 2022-07-29 PROCEDURE — 80197 ASSAY OF TACROLIMUS: CPT | Performed by: INTERNAL MEDICINE

## 2022-07-29 PROCEDURE — 85025 COMPLETE CBC W/AUTO DIFF WBC: CPT | Performed by: INTERNAL MEDICINE

## 2022-07-29 PROCEDURE — 80195 ASSAY OF SIROLIMUS: CPT | Performed by: INTERNAL MEDICINE

## 2022-07-30 LAB
SIROLIMUS BLD-MCNC: 7.2 NG/ML (ref 4–20)
TACROLIMUS BLD-MCNC: 4.7 NG/ML (ref 5–15)

## 2022-08-01 ENCOUNTER — PATIENT MESSAGE (OUTPATIENT)
Dept: TRANSPLANT | Facility: CLINIC | Age: 52
End: 2022-08-01
Payer: COMMERCIAL

## 2022-08-02 ENCOUNTER — LAB VISIT (OUTPATIENT)
Dept: LAB | Facility: HOSPITAL | Age: 52
End: 2022-08-02
Payer: COMMERCIAL

## 2022-08-02 DIAGNOSIS — Z94.1 HEART TRANSPLANTED: ICD-10-CM

## 2022-08-02 LAB
ANION GAP SERPL CALC-SCNC: 11 MMOL/L (ref 8–16)
BUN SERPL-MCNC: 26 MG/DL (ref 6–20)
CALCIUM SERPL-MCNC: 9.7 MG/DL (ref 8.7–10.5)
CHLORIDE SERPL-SCNC: 108 MMOL/L (ref 95–110)
CO2 SERPL-SCNC: 23 MMOL/L (ref 23–29)
CREAT SERPL-MCNC: 1 MG/DL (ref 0.5–1.4)
EST. GFR  (NO RACE VARIABLE): >60 ML/MIN/1.73 M^2
GLUCOSE SERPL-MCNC: 102 MG/DL (ref 70–110)
POTASSIUM SERPL-SCNC: 4.1 MMOL/L (ref 3.5–5.1)
SODIUM SERPL-SCNC: 142 MMOL/L (ref 136–145)

## 2022-08-02 PROCEDURE — 80195 ASSAY OF SIROLIMUS: CPT | Performed by: INTERNAL MEDICINE

## 2022-08-02 PROCEDURE — 36415 COLL VENOUS BLD VENIPUNCTURE: CPT | Performed by: INTERNAL MEDICINE

## 2022-08-02 PROCEDURE — 80048 BASIC METABOLIC PNL TOTAL CA: CPT | Performed by: INTERNAL MEDICINE

## 2022-08-03 ENCOUNTER — PATIENT MESSAGE (OUTPATIENT)
Dept: TRANSPLANT | Facility: CLINIC | Age: 52
End: 2022-08-03
Payer: COMMERCIAL

## 2022-08-03 LAB — SIROLIMUS BLD-MCNC: 7.4 NG/ML (ref 4–20)

## 2022-10-05 DIAGNOSIS — Z94.1 STATUS POST HEART TRANSPLANT: Primary | ICD-10-CM

## 2022-10-05 DIAGNOSIS — T86.290 VASCULOPATHY OF CARDIAC ALLOGRAFT: ICD-10-CM

## 2022-10-07 NOTE — PLAN OF CARE
"  [facility] Occupational Therapy  Initial Evaluation     Date: 10/23/2020  Patient: Deborah Navas  Chart Number: [id number]    Therapy Diagnosis:   Encounter Diagnoses   Name Primary?    Poor fine motor skills Yes    Thenar muscle atrophy of right hand     Decreased range of motion of right wrist     Decreased  strength of right hand     Decreased activities of daily living (ADL)      Physician: Ana Kay PA    Physician Orders: OT eval and tx  Medical Diagnosis: cubital tunnel syndrome, right; thenar muscle atrophy of left hand; thenar muscle atrophy of right hand  Evaluation Date: 10/23/2020  Insurance Authorization period Expiration: 12/31/2020  Plan of Care Expiration Period: 1/23/2020    Visit # / Visits Authortized: 1 / 20  Time In:1330  Time Out: 1415  Total Billable Time: 15 minutes    Precautions: Standard and organ transplant    Subjective     Involved Side: bilaterally but right side surgically  Dominant Side: Right  Date of Onset: symptoms started after heart transplant in 12/2019; sx on 10/9/2020  Mechanism of Injury/ History of Current Condition: Pt is s/p heart transplant on 12/2019. Over the past year, she has experienced B hand weakness and numbness, as well as muscle atrophy. She was later diagnosed with B ulnar neuropathy s/p EMG study and after participating in OT initially.  Surgical Procedure: R ulnar nerve decompression at Guyon's canal and cubital tunnel  Imaging: EMG 9/17/2020: There is electrodiagnostic evidence of a MODERATE-SEVERE demyelinating and axonal ulnar neuropathy (Cubital tunnel syndrome) across BILATERAL elbows-slightly worse on the RIGHT  Previous Therapy: yes - prior to nerve decompression    Patient's Goals for Therapy: "regain as much use and strength in my hand as I can"    Pain:  Functional Pain Scale Rating 0-10:   n/a/10 on average  n/a/10 at best  n/a/10 at worst  Location: n/a    Occupation:  Has been working from home 2* " COVID and surgery  Working presently: employed at [employer] as a   Duties: typing, computer work, filing records    Functional Limitations/Social History:    Previous functional status includes: Modified independent with all ADLs.     Current FunctionalStatus   Home/Living environment : lives with a friend      Limitation of Functional Status as follows:   ADLs/IADLs: Difficulty with turning on lamps and opening bottles of water    - Feeding: Difficulty with preparing and cutting food    - Bathing: Difficulty with holding soap and washing LUE and back    - Dressing/Grooming: Difficulty with fasteners    - Driving: Difficulty with shifting gears     Leisure: Enjoys crafting but has difficulty with small pieces      Past Medical History/Physical Systems Review:   Deborah Navas  has a past medical history of Fractures, History of ventricular fibrillation, History of ventricular tachycardia, Hyperlipidemia, Migraine, Multinodular goiter, Osteoarthritis, and Restrictive cardiomyopathy post heart transplant.    Deborah Navas  has a past surgical history that includes Tympanostomy tube placement (1971- 1979); Tonsillectomy; eardrum reconstruction (1980); Temporomandibular joint surgery (Right, 1988); Sinus surgery (Right, 1994); Forearm fracture surgery (Bilateral, 6904-0246); Elbow surgery (Left, 7676-6110); Lumbar fusion (2007); Back surgery; Bone graft (Left); Insertion of pacemaker (Left); Insertion of implantable cardioverter-defibrillator (ICD) generator with two existing leads; Right heart catheterization (Right, 9/4/2019); Left ventricular assist device (N/A, 9/10/2019); Insertion of graft to pericardium (9/10/2019); Sternal wound closure (9/10/2019); Treatment of cardiac arrhythmia (N/A, 9/24/2019); Right heart catheterization (N/A, 11/18/2019); Heart transplant (N/A, 12/16/2019); Biopsy with ultrasound guidance (N/A, 12/31/2019); Right heart catheterization (Right, 12/31/2019);  "Biopsy with ultrasound guidance (N/A, 1/7/2020); Right heart catheterization (Right, 1/7/2020); Biopsy with ultrasound guidance (N/A, 1/14/2020); Biopsy with ultrasound guidance (N/A, 1/28/2020); Biopsy with ultrasound guidance (N/A, 2/11/2020); Biopsy with ultrasound guidance (N/A, 3/10/2020); Wound exploration (Right, 5/27/2020); and Decompression of nerve (Right, 10/9/2020).    Deborah has a current medication list which includes the following prescription(s): amlodipine, amoxicillin, aspirin, atorvastatin, calcium carbonate-vitamin d3, dapsone, ferrous sulfate, gabapentin, hydrocodone-acetaminophen, magnesium oxide, mycophenolate, opw transplant care kit, pantoprazole, prednisone, senna-docusate 8.6-50 mg, sitagliptin, tacrolimus, venlafaxine, and zolpidem, and the following Facility-Administered Medications: fentanyl and midazolam.    Review of patient's allergies indicates:   Allergen Reactions    Adhesive Blisters     "Reaction to chest only up to neck. Denies any problems w/adhesive on any other areas of the body.    Codeine Itching          Objective     Observation/Inspection:  B hand muscle atrophy of interossei, thenar eminence, and hypothenar eminence    Sensation: Intact to light touch. Hypersensitivity reported. Paresthesias reported.   Scar: R posterior elbow: 8cm - Mild-moderate tenderness to palpation and hypersensitive. Scar is mildly thickened and raised, with mild adherence.   R volar wrist: 5.5cm - Moderated tenderness to palpation, increased hypersensitivity. Scar is moderately thickened and raised, with moderate adherence.      Active Range of Motion:    ROM (in degrees) Active Passive   Elbow flexion 131 148   Elbow extension 0 0   Pronation WNL WNL   Supination WNL WNL   Radial deviation 16 20   Ulnar deviation 25 30   Wrist flexion 41 68   Wrist extension 60 70       * Unable to cup palm to hold water  * Unable to cross fingers  * Unable to abduct fingers fully    * Able to form a full " composite, lumbrical, and hook fist                        Manual Muscle Test:  Not tested at this time 2* post-op status                                       and Pinch Strength: Not tested at this time 2* post-op status.     CMS Impairment/Limitation/Restriction for FOTO Elbow Survey    Therapist reviewed FOTO scores for Deborah Navas on 10/23/2020.   FOTO documents entered into [employer] - see Media section.    Limitation Score: 60%          Treatment     Treatment Time In: 1400  Treatment Time Out: 1415  Total Treatment time separate from Evaluation time:15    Meryl received therapeutic exercises for 15 minutes including:  - HEP for RUE wrist and hand ROM as well as intrinsic strengthening exercises for L hand    Home Exercise Program/Education:  Issued HEP (see patient instructions in EMR) and educated on modality use for pain management. Exercises were reviewed and Meryl was able to demonstrate them prior to the end of the session. Pt received a written copy of exercises to perform at home. Meryl demonstrated good  understanding of the education provided.  Pt was advised to perform these exercises free of pain, and to stop performing them if pain occurs.    Patient/Family Education: role of OT, goals for OT, scheduling/cancellations - pt verbalized understanding.     Additional Education provided: scar massage    Assessment     Deborah Navas is a 50 y.o. female referred to outpatient occupational/hand therapy and presents with a medical diagnosis of cubital tunnel syndrome - right, thenar muscle atrophy of left hand, and thenar muscle atrophy of right hand. She presents with paresthesias, decreased A/PROM, strength, and functional use of right UE. She demonstrates limitations as described in the chart below.     The patient's rehab potential is Good.     Anticipated barriers to occupational therapy: none  Pt has no cultural, educational or language barriers to learning provided.    Profile  and History Assessment of Occupational Performance Level of Clinical Decision Making Complexity Score   Occupational Profile:   Deborah Navas is a 50 y.o. female who lives with a friend and is currently employed. Deborah Navas has difficulty with  feeding, bathing, grooming and dressing  driving/transportation management, phone/computer use and housework/household chores  affecting his/her daily functional abilities. His/her main goal for therapy is regain functional use and strength of her R hand.     Comorbidities:    has a past medical history of Fractures, History of ventricular fibrillation, History of ventricular tachycardia, Hyperlipidemia, Migraine, Multinodular goiter, Osteoarthritis, and Restrictive cardiomyopathy post heart transplant.    Medical and Therapy History Review:   Expanded               Performance Deficits    Physical:  Joint Stability  Muscle Power/Strength  Muscle Endurance   Strength  Pinch Strength  Gross Motor Coordination  Fine Motor Coordination    Cognitive:  No Deficits    Psychosocial:    No Deficits     Clinical Decision Making:  low    Assessment Process:  Problem-Focused Assessments    Modification/Need for Assistance:  Not Necessary    Intervention Selection:  Several Treatment Options       low  Based on PMHX, co morbidities , data from assessments and functional level of assistance required with task and clinical presentation directly impacting function.       The following goals were discussed with the patient and patient is in agreement with them as to be addressed in the treatment plan.     Goals:   Short Term Goals: (4 weeks)  1. Pt will be independent with HEP in 2 visits.  2. Pt will be independent with ulnar nerve glides.  3. Pt will increase wrist AROM by 10 degrees to enable dressing, grooming activities.  4. Pt will tolerate light strengthening exercises for R hand.    Long Term Goals: (12 weeks)  1. Pt will be able to cross R digits to  enable stability and coordination of R hand.  2. Pt will exhibit 60-70 degrees of wrist flexion/extension to enable independence with self-care and meal preparation.  3. Pt will exhibit 40-50# R  strength to allow a firm grasp on cooking utensils, steering wheel, etc.  4. Pt will exhibit 5-9# of functional pinch strength to allow writing, opening containers, and turning keys.  5. Pt will exhibit a decrease in FOTO limitation score of <60% which would indicate an improvement in quality of life.      Plan   Certification Period/Plan of care expiration: 10/23/2020 to 1/23/2020.    Outpatient Occupational Therapy 1-2 times weekly for 12 weeks to include the following interventions:     Modalities to decrease pain (moist heat, paraffin, fluidotherapy, US), scar mobilization, manual therapy/joint mobilizations, A/PROM, therapeutic exercises/activities, strengthening, desensitization/sensory re-education, orthotic fitting/fabrication/training PRN, joint protections/energry conservation/adaptive equipment/activity modification, HEP/education as well as any other treatments deemed necessary based on the patient's needs or progress.    Plan Next Visit: Review scar massage and HEP; ulnar nerve glides; desensitization activities; intrinsic strengthening    INDIO Benavidez LOTR           Anesthesia Type: 1% lidocaine with epinephrine and a 1:10 solution of 8.4% sodium bicarbonate

## 2022-10-20 ENCOUNTER — PATIENT MESSAGE (OUTPATIENT)
Dept: TRANSPLANT | Facility: CLINIC | Age: 52
End: 2022-10-20
Payer: COMMERCIAL

## 2022-11-02 DIAGNOSIS — Z94.1 STATUS POST HEART TRANSPLANT: Primary | ICD-10-CM

## 2022-11-09 ENCOUNTER — PATIENT MESSAGE (OUTPATIENT)
Dept: TRANSPLANT | Facility: CLINIC | Age: 52
End: 2022-11-09
Payer: COMMERCIAL

## 2022-12-20 ENCOUNTER — TELEPHONE (OUTPATIENT)
Dept: TRANSPLANT | Facility: CLINIC | Age: 52
End: 2022-12-20
Payer: COMMERCIAL

## 2022-12-20 ENCOUNTER — PATIENT MESSAGE (OUTPATIENT)
Dept: TRANSPLANT | Facility: CLINIC | Age: 52
End: 2022-12-20
Payer: COMMERCIAL

## 2022-12-20 NOTE — TELEPHONE ENCOUNTER
Spoke to pt who reported via My O that her feet are swollen and BP diastolic was higher at 103. She said the swelling and blood pressures which she takes randomly have been gradually increasing. She denies shortness of breath and chest pain. I verified she takes Losarten 50 mg daily for blood pressure and IS consists of Rapamune 3 mg daily, Prednisone 5 mg daily and MMF 1000 mg bid. I spoke to Dr. Antunez who is seeing her Thursday and he said he would address these concerns at that time. He asked that she keep a log of her BP's and bring in her cuff. Pt verbalized understanding.

## 2022-12-22 ENCOUNTER — OFFICE VISIT (OUTPATIENT)
Dept: TRANSPLANT | Facility: CLINIC | Age: 52
End: 2022-12-22
Attending: INTERNAL MEDICINE
Payer: COMMERCIAL

## 2022-12-22 ENCOUNTER — DOCUMENTATION ONLY (OUTPATIENT)
Dept: TRANSPLANT | Facility: CLINIC | Age: 52
End: 2022-12-22

## 2022-12-22 ENCOUNTER — HOSPITAL ENCOUNTER (OUTPATIENT)
Dept: RADIOLOGY | Facility: HOSPITAL | Age: 52
Discharge: HOME | End: 2022-12-22
Attending: INTERNAL MEDICINE
Payer: COMMERCIAL

## 2022-12-22 ENCOUNTER — EDUCATION (OUTPATIENT)
Dept: TRANSPLANT | Facility: CLINIC | Age: 52
End: 2022-12-22
Payer: COMMERCIAL

## 2022-12-22 ENCOUNTER — HOSPITAL ENCOUNTER (OUTPATIENT)
Dept: CARDIOLOGY | Facility: HOSPITAL | Age: 52
Discharge: HOME | End: 2022-12-22
Attending: INTERNAL MEDICINE
Payer: COMMERCIAL

## 2022-12-22 VITALS
WEIGHT: 227.94 LBS | BODY MASS INDEX: 34.55 KG/M2 | HEART RATE: 119 BPM | HEIGHT: 68 IN | DIASTOLIC BLOOD PRESSURE: 93 MMHG | SYSTOLIC BLOOD PRESSURE: 149 MMHG

## 2022-12-22 VITALS
WEIGHT: 222 LBS | HEART RATE: 118 BPM | BODY MASS INDEX: 33.65 KG/M2 | SYSTOLIC BLOOD PRESSURE: 153 MMHG | DIASTOLIC BLOOD PRESSURE: 86 MMHG | HEIGHT: 68 IN

## 2022-12-22 DIAGNOSIS — D84.821 IMMUNODEFICIENCY DUE TO TREATMENT WITH IMMUNOSUPPRESSIVE MEDICATION: ICD-10-CM

## 2022-12-22 DIAGNOSIS — Z79.899 IMMUNOCOMPROMISED STATE DUE TO DRUG THERAPY: ICD-10-CM

## 2022-12-22 DIAGNOSIS — D86.85 CARDIAC SARCOIDOSIS: ICD-10-CM

## 2022-12-22 DIAGNOSIS — E78.2 MIXED HYPERLIPIDEMIA: ICD-10-CM

## 2022-12-22 DIAGNOSIS — D84.821 IMMUNOCOMPROMISED STATE DUE TO DRUG THERAPY: ICD-10-CM

## 2022-12-22 DIAGNOSIS — Z79.899 IMMUNODEFICIENCY DUE TO TREATMENT WITH IMMUNOSUPPRESSIVE MEDICATION: ICD-10-CM

## 2022-12-22 DIAGNOSIS — E87.6 HYPOKALEMIA: ICD-10-CM

## 2022-12-22 DIAGNOSIS — I15.8 HYPERTENSION ASSOCIATED WITH TRANSPLANTATION: ICD-10-CM

## 2022-12-22 DIAGNOSIS — Z94.9 HYPERTENSION ASSOCIATED WITH TRANSPLANTATION: ICD-10-CM

## 2022-12-22 DIAGNOSIS — E66.09 CLASS 1 OBESITY DUE TO EXCESS CALORIES WITH SERIOUS COMORBIDITY AND BODY MASS INDEX (BMI) OF 34.0 TO 34.9 IN ADULT: ICD-10-CM

## 2022-12-22 DIAGNOSIS — Z94.1 S/P ORTHOTOPIC HEART TRANSPLANT: Primary | ICD-10-CM

## 2022-12-22 DIAGNOSIS — Z94.1 STATUS POST HEART TRANSPLANT: ICD-10-CM

## 2022-12-22 DIAGNOSIS — I25.811 CORONARY ARTERY DISEASE INVOLVING NATIVE ARTERY OF TRANSPLANTED HEART WITHOUT ANGINA PECTORIS: ICD-10-CM

## 2022-12-22 LAB
ASCENDING AORTA: 2.53 CM
AV INDEX (PROSTH): 0.87
AV MEAN GRADIENT: 4 MMHG
AV PEAK GRADIENT: 6 MMHG
AV VALVE AREA: 3.11 CM2
AV VELOCITY RATIO: 0.88
BSA FOR ECHO PROCEDURE: 2.2 M2
CV ECHO LV RWT: 0.38 CM
DOP CALC AO PEAK VEL: 1.18 M/S
DOP CALC AO VTI: 19.9 CM
DOP CALC LVOT AREA: 3.6 CM2
DOP CALC LVOT DIAMETER: 2.14 CM
DOP CALC LVOT PEAK VEL: 1.04 M/S
DOP CALC LVOT STROKE VOLUME: 61.94 CM3
DOP CALCLVOT PEAK VEL VTI: 17.23 CM
E WAVE DECELERATION TIME: 142.04 MSEC
E/A RATIO: 1.71
E/E' RATIO: 7.13 M/S
ECHO LV POSTERIOR WALL: 0.77 CM (ref 0.6–1.1)
EJECTION FRACTION: 65 %
FRACTIONAL SHORTENING: 31 % (ref 28–44)
INTERVENTRICULAR SEPTUM: 0.7 CM (ref 0.6–1.1)
IVRT: 105.61 MSEC
LEFT INTERNAL DIMENSION IN SYSTOLE: 2.83 CM (ref 2.1–4)
LEFT VENTRICLE DIASTOLIC VOLUME INDEX: 34.75 ML/M2
LEFT VENTRICLE DIASTOLIC VOLUME: 74.36 ML
LEFT VENTRICLE MASS INDEX: 41 G/M2
LEFT VENTRICLE SYSTOLIC VOLUME INDEX: 14.2 ML/M2
LEFT VENTRICLE SYSTOLIC VOLUME: 30.31 ML
LEFT VENTRICULAR INTERNAL DIMENSION IN DIASTOLE: 4.1 CM (ref 3.5–6)
LEFT VENTRICULAR MASS: 87.03 G
LV LATERAL E/E' RATIO: 5.86 M/S
LV SEPTAL E/E' RATIO: 9.11 M/S
MV PEAK A VEL: 0.48 M/S
MV PEAK E VEL: 0.82 M/S
MV STENOSIS PRESSURE HALF TIME: 41.19 MS
MV VALVE AREA P 1/2 METHOD: 5.34 CM2
PISA TR MAX VEL: 2.42 M/S
RA PRESSURE: 3 MMHG
RV TISSUE DOPPLER FREE WALL SYSTOLIC VELOCITY 1 (APICAL 4 CHAMBER VIEW): 9.56 CM/S
SINUS: 2.84 CM
STJ: 2.35 CM
TDI LATERAL: 0.14 M/S
TDI SEPTAL: 0.09 M/S
TDI: 0.12 M/S
TR MAX PG: 23 MMHG
TRICUSPID ANNULAR PLANE SYSTOLIC EXCURSION: 0.95 CM
TV REST PULMONARY ARTERY PRESSURE: 26 MMHG

## 2022-12-22 PROCEDURE — 3008F PR BODY MASS INDEX (BMI) DOCUMENTED: ICD-10-PCS | Mod: CPTII,S$GLB,, | Performed by: INTERNAL MEDICINE

## 2022-12-22 PROCEDURE — 99215 OFFICE O/P EST HI 40 MIN: CPT | Mod: S$GLB,,, | Performed by: INTERNAL MEDICINE

## 2022-12-22 PROCEDURE — 4010F PR ACE/ARB THEARPY RXD/TAKEN: ICD-10-PCS | Mod: CPTII,S$GLB,, | Performed by: INTERNAL MEDICINE

## 2022-12-22 PROCEDURE — 3080F DIAST BP >= 90 MM HG: CPT | Mod: CPTII,S$GLB,, | Performed by: INTERNAL MEDICINE

## 2022-12-22 PROCEDURE — 3044F PR MOST RECENT HEMOGLOBIN A1C LEVEL <7.0%: ICD-10-PCS | Mod: CPTII,S$GLB,, | Performed by: INTERNAL MEDICINE

## 2022-12-22 PROCEDURE — 3008F BODY MASS INDEX DOCD: CPT | Mod: CPTII,S$GLB,, | Performed by: INTERNAL MEDICINE

## 2022-12-22 PROCEDURE — 93306 TTE W/DOPPLER COMPLETE: CPT | Mod: 26,,, | Performed by: INTERNAL MEDICINE

## 2022-12-22 PROCEDURE — 99215 PR OFFICE/OUTPT VISIT, EST, LEVL V, 40-54 MIN: ICD-10-PCS | Mod: S$GLB,,, | Performed by: INTERNAL MEDICINE

## 2022-12-22 PROCEDURE — 1159F MED LIST DOCD IN RCRD: CPT | Mod: CPTII,S$GLB,, | Performed by: INTERNAL MEDICINE

## 2022-12-22 PROCEDURE — 4010F ACE/ARB THERAPY RXD/TAKEN: CPT | Mod: CPTII,S$GLB,, | Performed by: INTERNAL MEDICINE

## 2022-12-22 PROCEDURE — 1160F RVW MEDS BY RX/DR IN RCRD: CPT | Mod: CPTII,S$GLB,, | Performed by: INTERNAL MEDICINE

## 2022-12-22 PROCEDURE — 3077F PR MOST RECENT SYSTOLIC BLOOD PRESSURE >= 140 MM HG: ICD-10-PCS | Mod: CPTII,S$GLB,, | Performed by: INTERNAL MEDICINE

## 2022-12-22 PROCEDURE — 71046 X-RAY EXAM CHEST 2 VIEWS: CPT | Mod: 26,,, | Performed by: RADIOLOGY

## 2022-12-22 PROCEDURE — 3080F PR MOST RECENT DIASTOLIC BLOOD PRESSURE >= 90 MM HG: ICD-10-PCS | Mod: CPTII,S$GLB,, | Performed by: INTERNAL MEDICINE

## 2022-12-22 PROCEDURE — 3044F HG A1C LEVEL LT 7.0%: CPT | Mod: CPTII,S$GLB,, | Performed by: INTERNAL MEDICINE

## 2022-12-22 PROCEDURE — 93306 TTE W/DOPPLER COMPLETE: CPT

## 2022-12-22 PROCEDURE — 99999 PR PBB SHADOW E&M-EST. PATIENT-LVL IV: CPT | Mod: PBBFAC,,, | Performed by: INTERNAL MEDICINE

## 2022-12-22 PROCEDURE — 3077F SYST BP >= 140 MM HG: CPT | Mod: CPTII,S$GLB,, | Performed by: INTERNAL MEDICINE

## 2022-12-22 PROCEDURE — 1159F PR MEDICATION LIST DOCUMENTED IN MEDICAL RECORD: ICD-10-PCS | Mod: CPTII,S$GLB,, | Performed by: INTERNAL MEDICINE

## 2022-12-22 PROCEDURE — 71046 X-RAY EXAM CHEST 2 VIEWS: CPT | Mod: TC,FY

## 2022-12-22 PROCEDURE — 71046 XR CHEST PA AND LATERAL: ICD-10-PCS | Mod: 26,,, | Performed by: RADIOLOGY

## 2022-12-22 PROCEDURE — 99999 PR PBB SHADOW E&M-EST. PATIENT-LVL IV: ICD-10-PCS | Mod: PBBFAC,,, | Performed by: INTERNAL MEDICINE

## 2022-12-22 PROCEDURE — 1160F PR REVIEW ALL MEDS BY PRESCRIBER/CLIN PHARMACIST DOCUMENTED: ICD-10-PCS | Mod: CPTII,S$GLB,, | Performed by: INTERNAL MEDICINE

## 2022-12-22 PROCEDURE — 93306 ECHO (CUPID ONLY): ICD-10-PCS | Mod: 26,,, | Performed by: INTERNAL MEDICINE

## 2022-12-22 RX ORDER — CANDESARTAN 8 MG/1
8 TABLET ORAL DAILY
Qty: 90 TABLET | Refills: 3 | Status: SHIPPED | OUTPATIENT
Start: 2022-12-22 | End: 2023-06-20

## 2022-12-22 RX ORDER — ROSUVASTATIN CALCIUM 40 MG/1
40 TABLET, COATED ORAL NIGHTLY
Qty: 90 TABLET | Refills: 3 | Status: SHIPPED | OUTPATIENT
Start: 2022-12-22 | End: 2023-11-28

## 2022-12-22 NOTE — LETTER
December 22, 2022        Joaquin Del Toro  [address]  [address]  [place] [place] [address]  Phone: [phone]  Fax: [phone]             [id number]  [address]  [facility] [address]  Phone: [phone]   Patient: Deborah Navas   MR Number: [id number]   YOB: 1970   Date of Visit: 12/22/2022       Dear Dr. Joaquin Del Toro    Thank you for referring Deborah Navas to me for evaluation. Attached you will find relevant portions of my assessment and plan of care.    If you have questions, please do not hesitate to call me. I look forward to following Deborah Navas along with you.    Sincerely,    Eric Antunez Jr, MD    Enclosure    If you would like to receive this communication electronically, please contact [email or web address] or [phone] to request "[employer] Link access.    "[employer] Link is a tool which provides read-only access to select patient information with whom you have a relationship. Its easy to use and provides real time access to review your patients record including encounter summaries, notes, results, and demographic information.    If you feel you have received this communication in error or would no longer like to receive these types of communications, please e-mail [email or web address]

## 2022-12-22 NOTE — PROGRESS NOTES
Met with pt due to TA (H) and LDL: 82.6 (H for CAD guidelines)    Pt reports eating whole milk yogurt twice per day (18g sat fat) and cheese. Breakfast is belvita bar with pb. Lunch is a bagel sandwich. Dinner is a lean meat and vegetable. Pt does walnuts as a snack    Recc pt limit sat fat intake by switching to non-fat dairy. Will monitor pt's labs and follow up as needed

## 2022-12-22 NOTE — PROGRESS NOTES
"Subjective:   Ms. Navas is a 52 y.o. year old White female who received a donation after brain death heart transplant on 12/16/19.      CMV status:   Donor: +  Recipient:-    HPI  51 yo WF s/p OHT 12/16/2019 is here for  post OHT visit.  She had extensive sarcoidosis on her explanted heart.  This was a surprise finding.  She has not yet seen rheumatology and we are going to help her get an appointment it can even be done in [place] as we need her followed for systemic sarcoid involvement and evaluated to see if any is present now.      She continues to do really well. Has no cardiac complaints.  She is walking on a treadmill at home for 10 minutes but only does this a couple of days a week.    Immunosuppression regimen includes; sirolimus 3 mg daily, Cellcept 1000 mg BID, Prednisone 5 mg qd (left on due to sarcoidosis)    She is noted to have hypokalemia today with hypertension.  She has been maintained on KCL, has a normal magnesium and is not on a diuretic.  Suspect hyperaldosterone hypertension.    She has CAV by intravascular ultrasound on her angiogram see report below.    Review of Systems   Constitutional: Negative for chills, fever, malaise/fatigue, night sweats, weight gain and weight loss.   Cardiovascular:  Negative for chest pain, dyspnea on exertion, irregular heartbeat, leg swelling, near-syncope, orthopnea, palpitations, paroxysmal nocturnal dyspnea and syncope.   Respiratory:  Negative for cough, sputum production and wheezing.    Hematologic/Lymphatic: Does not bruise/bleed easily.   Musculoskeletal:  Negative for arthritis, joint pain and stiffness.   Gastrointestinal:  Negative for hematochezia and melena.   Genitourinary:  Negative for hematuria.   Neurological:  Negative for brief paralysis, focal weakness, seizures and weakness.     Objective:   Blood pressure (!) 149/93, pulse (!) 119, height 5' 8" (1.727 m), weight 103.4 kg (227 lb 15.3 oz).body mass index is 34.66 kg/m².    Physical " "Exam  Constitutional:       General: She is not in acute distress.     Appearance: She is well-developed. She is not ill-appearing, toxic-appearing or diaphoretic.      Comments: BP (!) 149/93 (BP Location: Left arm, Patient Position: Sitting, BP Method: Medium (Automatic))   Pulse (!) 119   Ht 5' 8" (1.727 m)   Wt 103.4 kg (227 lb 15.3 oz)   LMP  (LMP Unknown) Comment: LMP 2015  BMI 34.66 kg/m²   Cushingoid appearance face and upper body   HENT:      Head: Normocephalic and atraumatic.   Eyes:      General: No scleral icterus.        Right eye: No discharge.         Left eye: No discharge.      Conjunctiva/sclera: Conjunctivae normal.   Neck:      Thyroid: No thyromegaly.      Vascular: No JVD.      Trachea: No tracheal deviation.   Cardiovascular:      Rate and Rhythm: Normal rate and regular rhythm.      Heart sounds: Normal heart sounds. No murmur heard.    No gallop.   Pulmonary:      Effort: Pulmonary effort is normal.      Breath sounds: Normal breath sounds.   Abdominal:      General: Bowel sounds are normal. There is no distension.      Palpations: Abdomen is soft. There is no mass.      Tenderness: There is no abdominal tenderness. There is no guarding or rebound.   Musculoskeletal:         General: No swelling or tenderness.      Right lower leg: No edema.      Left lower leg: No edema.   Skin:     General: Skin is warm and dry.   Neurological:      General: No focal deficit present.      Mental Status: She is alert and oriented to person, place, and time. Mental status is at baseline.   Psychiatric:         Mood and Affect: Mood normal.         Behavior: Behavior normal.         Thought Content: Thought content normal.         Judgment: Judgment normal.     Lab Results   Component Value Date    BNP 69 12/22/2022     12/22/2022     11/01/2019    K 3.1 (L) 12/22/2022    K 4.5 11/01/2019    MG 1.9 12/22/2022     12/22/2022    CL 24 10/10/2019    CO2 21 (L) 12/22/2022    PHOS 4.1 " 06/03/2020    BUN 23 (H) 12/22/2022    BUN 20 11/01/2019    CREATININE 1.1 12/22/2022    CREATININE 1.4 11/01/2019     12/22/2022    HGBA1C 5.9 (H) 02/17/2022    AST 18 12/22/2022    ALT 26 12/22/2022    ALBUMIN 3.7 12/22/2022    PROT 7.0 12/22/2022    BILITOT 0.3 12/22/2022    WBC 6.05 12/22/2022    HGB 12.4 12/22/2022    HCT 42.1 12/22/2022    HCT 24 (L) 12/18/2019     12/22/2022    INR 1.2 12/23/2019    INR 3.4 11/11/2019     12/16/2019    TSH 0.821 12/22/2022    G0ZDHTD 8.6 11/30/2020    FREET4 1.00 12/22/2022    CHOL 184 12/22/2022    HDL 48 12/22/2022    LDLCALC 82.6 12/22/2022    TRIG 267 (H) 12/22/2022    SIROLIMUS pending 12/22/2022 December 22, 2022 echocardiogram  The patient is status post cardiac transplantation.  The left ventricle is normal in size with normal systolic function.  The estimated ejection fraction is 65%.  Normal left ventricular diastolic function.  Normal right ventricular size with normal right ventricular systolic function.  The estimated PA systolic pressure is 26 mmHg.  Normal central venous pressure (3 mmHg).     March 18, 2022 coronary angiogram  Normal coronary arteries by angiography  Eccentric plaque as detailed below noted on IVUs or LM, proximal LAD, and mid LAD    Assessment:     1. S/P orthotopic heart transplant    2. Immunocompromised state due to drug therapy    3. 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 Immunodeficiency due to treatment with immunosuppressive medication    5. Hypertension associated with transplantation    6. Hypokalemia    7. Mixed hyperlipidemia    8. Coronary artery disease involving native artery of transplanted heart without angina pectoris    9. Class 1 obesity due to excess calories with serious comorbidity and body mass index (BMI) of 34.0 to 34.9 in adult        Plan:   Will target LDL less than 50; change atorvastatin to rosuvastatin 40 mg nightly and if this fails add PCSK9  inhibitor    Target blood pressure less than 130/80.  Will change losartan to candesartan 8 mg daily and up titrate as needed to maintain this goal    Add spironolactone 25 mg daily and since she is on chronic potassium will check a potassium level (BNP) 5 days after the initiation of this agent and 2 weeks later.    Work on diet, weight loss and increase exercise working up to 45 minutes at least 5 days per week    Return instructions as set forth by post transplant schedule or as needed:    Clinic: Return for labs and/or biopsy weekly the first month, every two weeks during month 2 and then monthly for the first year at the provider or coordinator's discretion. During the second year, return to clinic every 3 months. Post transplant year 3-5 return every 6 months. There will be a comprehensive post transplant evaluation every year that may include LHC/RHC/biopsy, stress test, echo, CXR, and other health screening exams.    In addition to the clinical assessment, I have ordered Allomap testing for this patient to assist in identification of moderate/severe acute cellular rejection (ACR) in a pt with stable Allograft function instead of endomyocardial biopsy.     Patient is reminded to call with any health changes as these can be early signs of transplant complications. Patient is advised to make sure any new medications or changes of old medications are discussed with a pharmacist or physician knowledgeable with transplant to avoid rejection/drug toxicity related to significant drug interactions.    Patient advised that it is recommended that all transplanted patients, and their close contacts and household members receive Covid vaccination.    UNOS Patient Status  Functional Status: 100% - Normal, no complaints, no evidence of disease  Physical Capacity: No Limitations    Eric Antunez Jr, MD

## 2023-01-04 ENCOUNTER — PATIENT MESSAGE (OUTPATIENT)
Dept: TRANSPLANT | Facility: CLINIC | Age: 53
End: 2023-01-04
Payer: COMMERCIAL

## 2023-01-10 ENCOUNTER — LAB VISIT (OUTPATIENT)
Dept: LAB | Facility: HOSPITAL | Age: 53
End: 2023-01-10
Attending: FAMILY MEDICINE
Payer: COMMERCIAL

## 2023-01-10 DIAGNOSIS — Z94.1 HEART TRANSPLANTED: ICD-10-CM

## 2023-01-10 LAB
ANION GAP SERPL CALC-SCNC: 11 MMOL/L (ref 8–16)
BUN SERPL-MCNC: 20 MG/DL (ref 6–20)
CALCIUM SERPL-MCNC: 9.6 MG/DL (ref 8.7–10.5)
CHLORIDE SERPL-SCNC: 106 MMOL/L (ref 95–110)
CO2 SERPL-SCNC: 26 MMOL/L (ref 23–29)
CREAT SERPL-MCNC: 1.3 MG/DL (ref 0.5–1.4)
EST. GFR  (NO RACE VARIABLE): 49.2 ML/MIN/1.73 M^2
GLUCOSE SERPL-MCNC: 113 MG/DL (ref 70–110)
POTASSIUM SERPL-SCNC: 4.3 MMOL/L (ref 3.5–5.1)
SODIUM SERPL-SCNC: 143 MMOL/L (ref 136–145)

## 2023-01-10 PROCEDURE — 36415 COLL VENOUS BLD VENIPUNCTURE: CPT | Performed by: INTERNAL MEDICINE

## 2023-01-10 PROCEDURE — 80048 BASIC METABOLIC PNL TOTAL CA: CPT | Performed by: INTERNAL MEDICINE

## 2023-01-12 ENCOUNTER — PATIENT MESSAGE (OUTPATIENT)
Dept: TRANSPLANT | Facility: CLINIC | Age: 53
End: 2023-01-12
Payer: COMMERCIAL

## 2023-01-24 ENCOUNTER — LAB VISIT (OUTPATIENT)
Dept: LAB | Facility: HOSPITAL | Age: 53
End: 2023-01-24
Attending: INTERNAL MEDICINE
Payer: COMMERCIAL

## 2023-01-24 ENCOUNTER — PATIENT MESSAGE (OUTPATIENT)
Dept: TRANSPLANT | Facility: CLINIC | Age: 53
End: 2023-01-24
Payer: COMMERCIAL

## 2023-01-24 DIAGNOSIS — Z94.1 HEART TRANSPLANTED: ICD-10-CM

## 2023-01-24 LAB
ANION GAP SERPL CALC-SCNC: 13 MMOL/L (ref 8–16)
BUN SERPL-MCNC: 18 MG/DL (ref 6–20)
CALCIUM SERPL-MCNC: 10.1 MG/DL (ref 8.7–10.5)
CHLORIDE SERPL-SCNC: 102 MMOL/L (ref 95–110)
CO2 SERPL-SCNC: 25 MMOL/L (ref 23–29)
CREAT SERPL-MCNC: 1.2 MG/DL (ref 0.5–1.4)
EST. GFR  (NO RACE VARIABLE): 54.1 ML/MIN/1.73 M^2
GLUCOSE SERPL-MCNC: 112 MG/DL (ref 70–110)
POTASSIUM SERPL-SCNC: 4.4 MMOL/L (ref 3.5–5.1)
SODIUM SERPL-SCNC: 140 MMOL/L (ref 136–145)

## 2023-01-24 PROCEDURE — 80048 BASIC METABOLIC PNL TOTAL CA: CPT | Performed by: INTERNAL MEDICINE

## 2023-01-24 PROCEDURE — 36415 COLL VENOUS BLD VENIPUNCTURE: CPT | Performed by: INTERNAL MEDICINE

## 2023-01-25 ENCOUNTER — PATIENT MESSAGE (OUTPATIENT)
Dept: TRANSPLANT | Facility: CLINIC | Age: 53
End: 2023-01-25
Payer: COMMERCIAL

## 2023-01-27 DIAGNOSIS — Z94.1 HEART TRANSPLANTED: Primary | ICD-10-CM

## 2023-01-27 RX ORDER — PANTOPRAZOLE SODIUM 40 MG/1
40 TABLET, DELAYED RELEASE ORAL DAILY
Qty: 30 TABLET | Refills: 11 | Status: SHIPPED | OUTPATIENT
Start: 2023-01-27 | End: 2024-01-16 | Stop reason: SDUPTHER

## 2023-02-01 ENCOUNTER — HOSPITAL ENCOUNTER (OUTPATIENT)
Dept: RADIOLOGY | Facility: HOSPITAL | Age: 53
Discharge: HOME | End: 2023-02-01
Attending: PODIATRIST
Payer: COMMERCIAL

## 2023-02-01 ENCOUNTER — OFFICE VISIT (OUTPATIENT)
Dept: PODIATRY | Facility: CLINIC | Age: 53
End: 2023-02-01
Payer: COMMERCIAL

## 2023-02-01 VITALS — BODY MASS INDEX: 34.4 KG/M2 | WEIGHT: 227 LBS | HEIGHT: 68 IN

## 2023-02-01 DIAGNOSIS — S99.922A FOOT INJURY, LEFT, INITIAL ENCOUNTER: ICD-10-CM

## 2023-02-01 DIAGNOSIS — S90.122A CONTUSION OF LEFT LESSER TOE(S) WITHOUT DAMAGE TO NAIL, INITIAL ENCOUNTER: ICD-10-CM

## 2023-02-01 DIAGNOSIS — S99.922A FOOT INJURY, LEFT, INITIAL ENCOUNTER: Primary | ICD-10-CM

## 2023-02-01 PROCEDURE — 1159F MED LIST DOCD IN RCRD: CPT | Mod: CPTII,S$GLB,, | Performed by: PODIATRIST

## 2023-02-01 PROCEDURE — 99999 PR PBB SHADOW E&M-EST. PATIENT-LVL IV: CPT | Mod: PBBFAC,,, | Performed by: PODIATRIST

## 2023-02-01 PROCEDURE — 4010F ACE/ARB THERAPY RXD/TAKEN: CPT | Mod: CPTII,S$GLB,, | Performed by: PODIATRIST

## 2023-02-01 PROCEDURE — 3008F BODY MASS INDEX DOCD: CPT | Mod: CPTII,S$GLB,, | Performed by: PODIATRIST

## 2023-02-01 PROCEDURE — 99213 OFFICE O/P EST LOW 20 MIN: CPT | Mod: S$GLB,,, | Performed by: PODIATRIST

## 2023-02-01 PROCEDURE — 73630 XR FOOT COMPLETE 3 VIEW LEFT: ICD-10-PCS | Mod: 26,LT,, | Performed by: RADIOLOGY

## 2023-02-01 PROCEDURE — 1159F PR MEDICATION LIST DOCUMENTED IN MEDICAL RECORD: ICD-10-PCS | Mod: CPTII,S$GLB,, | Performed by: PODIATRIST

## 2023-02-01 PROCEDURE — 99213 PR OFFICE/OUTPT VISIT, EST, LEVL III, 20-29 MIN: ICD-10-PCS | Mod: S$GLB,,, | Performed by: PODIATRIST

## 2023-02-01 PROCEDURE — 73630 X-RAY EXAM OF FOOT: CPT | Mod: 26,LT,, | Performed by: RADIOLOGY

## 2023-02-01 PROCEDURE — 3008F PR BODY MASS INDEX (BMI) DOCUMENTED: ICD-10-PCS | Mod: CPTII,S$GLB,, | Performed by: PODIATRIST

## 2023-02-01 PROCEDURE — 73630 X-RAY EXAM OF FOOT: CPT | Mod: TC,LT

## 2023-02-01 PROCEDURE — 4010F PR ACE/ARB THEARPY RXD/TAKEN: ICD-10-PCS | Mod: CPTII,S$GLB,, | Performed by: PODIATRIST

## 2023-02-01 PROCEDURE — 1160F RVW MEDS BY RX/DR IN RCRD: CPT | Mod: CPTII,S$GLB,, | Performed by: PODIATRIST

## 2023-02-01 PROCEDURE — 99999 PR PBB SHADOW E&M-EST. PATIENT-LVL IV: ICD-10-PCS | Mod: PBBFAC,,, | Performed by: PODIATRIST

## 2023-02-01 PROCEDURE — 1160F PR REVIEW ALL MEDS BY PRESCRIBER/CLIN PHARMACIST DOCUMENTED: ICD-10-PCS | Mod: CPTII,S$GLB,, | Performed by: PODIATRIST
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
## 2023-02-02 ENCOUNTER — PATIENT MESSAGE (OUTPATIENT)
Dept: PODIATRY | Facility: CLINIC | Age: 53
End: 2023-02-02
Payer: COMMERCIAL

## 2023-02-10 ENCOUNTER — PATIENT MESSAGE (OUTPATIENT)
Dept: TRANSPLANT | Facility: CLINIC | Age: 53
End: 2023-02-10
Payer: COMMERCIAL

## 2023-02-28 ENCOUNTER — OFFICE VISIT (OUTPATIENT)
Dept: PODIATRY | Facility: CLINIC | Age: 53
End: 2023-02-28
Payer: COMMERCIAL

## 2023-02-28 VITALS — BODY MASS INDEX: 34.41 KG/M2 | HEIGHT: 68 IN | WEIGHT: 227.06 LBS

## 2023-02-28 DIAGNOSIS — Z94.1 HEART TRANSPLANTED: ICD-10-CM

## 2023-02-28 DIAGNOSIS — Z79.899 IMMUNODEFICIENCY DUE TO TREATMENT WITH IMMUNOSUPPRESSIVE MEDICATION: ICD-10-CM

## 2023-02-28 DIAGNOSIS — S92.512G: Primary | ICD-10-CM

## 2023-02-28 DIAGNOSIS — D84.821 IMMUNODEFICIENCY DUE TO TREATMENT WITH IMMUNOSUPPRESSIVE MEDICATION: ICD-10-CM

## 2023-02-28 PROCEDURE — 99213 PR OFFICE/OUTPT VISIT, EST, LEVL III, 20-29 MIN: ICD-10-PCS | Mod: S$GLB,,, | Performed by: PODIATRIST

## 2023-02-28 PROCEDURE — 4010F ACE/ARB THERAPY RXD/TAKEN: CPT | Mod: CPTII,S$GLB,, | Performed by: PODIATRIST

## 2023-02-28 PROCEDURE — 3008F BODY MASS INDEX DOCD: CPT | Mod: CPTII,S$GLB,, | Performed by: PODIATRIST

## 2023-02-28 PROCEDURE — 1160F PR REVIEW ALL MEDS BY PRESCRIBER/CLIN PHARMACIST DOCUMENTED: ICD-10-PCS | Mod: CPTII,S$GLB,, | Performed by: PODIATRIST

## 2023-02-28 PROCEDURE — 3008F PR BODY MASS INDEX (BMI) DOCUMENTED: ICD-10-PCS | Mod: CPTII,S$GLB,, | Performed by: PODIATRIST

## 2023-02-28 PROCEDURE — 99213 OFFICE O/P EST LOW 20 MIN: CPT | Mod: S$GLB,,, | Performed by: PODIATRIST

## 2023-02-28 PROCEDURE — 99999 PR PBB SHADOW E&M-EST. PATIENT-LVL V: ICD-10-PCS | Mod: PBBFAC,,, | Performed by: PODIATRIST

## 2023-02-28 PROCEDURE — 4010F PR ACE/ARB THEARPY RXD/TAKEN: ICD-10-PCS | Mod: CPTII,S$GLB,, | Performed by: PODIATRIST

## 2023-02-28 PROCEDURE — 1159F MED LIST DOCD IN RCRD: CPT | Mod: CPTII,S$GLB,, | Performed by: PODIATRIST

## 2023-02-28 PROCEDURE — 1159F PR MEDICATION LIST DOCUMENTED IN MEDICAL RECORD: ICD-10-PCS | Mod: CPTII,S$GLB,, | Performed by: PODIATRIST

## 2023-02-28 PROCEDURE — 1160F RVW MEDS BY RX/DR IN RCRD: CPT | Mod: CPTII,S$GLB,, | Performed by: PODIATRIST

## 2023-02-28 PROCEDURE — 99999 PR PBB SHADOW E&M-EST. PATIENT-LVL V: CPT | Mod: PBBFAC,,, | Performed by: PODIATRIST

## 2023-03-01 ENCOUNTER — PATIENT MESSAGE (OUTPATIENT)
Dept: TRANSPLANT | Facility: CLINIC | Age: 53
End: 2023-03-01
Payer: COMMERCIAL
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
## 2023-03-06 ENCOUNTER — HOSPITAL ENCOUNTER (OUTPATIENT)
Dept: PREADMISSION TESTING | Facility: HOSPITAL | Age: 53
Discharge: HOME | End: 2023-03-06
Attending: PODIATRIST
Payer: COMMERCIAL

## 2023-03-06 VITALS
SYSTOLIC BLOOD PRESSURE: 137 MMHG | HEART RATE: 107 BPM | DIASTOLIC BLOOD PRESSURE: 80 MMHG | OXYGEN SATURATION: 98 % | RESPIRATION RATE: 17 BRPM | TEMPERATURE: 98 F

## 2023-03-06 DIAGNOSIS — D86.85 CARDIAC SARCOIDOSIS: ICD-10-CM

## 2023-03-06 DIAGNOSIS — Z94.1 HEART TRANSPLANTED: ICD-10-CM

## 2023-03-06 DIAGNOSIS — Z79.899 IMMUNOCOMPROMISED STATE DUE TO DRUG THERAPY: ICD-10-CM

## 2023-03-06 DIAGNOSIS — Z01.810 PREOP CARDIOVASCULAR EXAM: ICD-10-CM

## 2023-03-06 DIAGNOSIS — N18.31 STAGE 3A CHRONIC KIDNEY DISEASE: ICD-10-CM

## 2023-03-06 DIAGNOSIS — E78.2 MIXED HYPERLIPIDEMIA: ICD-10-CM

## 2023-03-06 DIAGNOSIS — S92.512G: ICD-10-CM

## 2023-03-06 DIAGNOSIS — I42.5 RESTRICTIVE CARDIOMYOPATHY: ICD-10-CM

## 2023-03-06 DIAGNOSIS — D84.821 IMMUNOCOMPROMISED STATE DUE TO DRUG THERAPY: ICD-10-CM

## 2023-03-06 DIAGNOSIS — Z01.818 PREOP EXAMINATION: Primary | ICD-10-CM

## 2023-03-06 LAB
ALBUMIN SERPL BCP-MCNC: 3.9 G/DL (ref 3.5–5.2)
ALP SERPL-CCNC: 83 U/L (ref 55–135)
ALT SERPL W/O P-5'-P-CCNC: 18 U/L (ref 10–44)
ANION GAP SERPL CALC-SCNC: 12 MMOL/L (ref 8–16)
AST SERPL-CCNC: 18 U/L (ref 10–40)
BASOPHILS # BLD AUTO: 0.04 K/UL (ref 0–0.2)
BASOPHILS NFR BLD: 0.7 % (ref 0–1.9)
BILIRUB SERPL-MCNC: 0.3 MG/DL (ref 0.1–1)
BUN SERPL-MCNC: 25 MG/DL (ref 6–20)
CALCIUM SERPL-MCNC: 10.1 MG/DL (ref 8.7–10.5)
CHLORIDE SERPL-SCNC: 108 MMOL/L (ref 95–110)
CO2 SERPL-SCNC: 22 MMOL/L (ref 23–29)
CREAT SERPL-MCNC: 1.2 MG/DL (ref 0.5–1.4)
DIFFERENTIAL METHOD: ABNORMAL
EOSINOPHIL # BLD AUTO: 0.2 K/UL (ref 0–0.5)
EOSINOPHIL NFR BLD: 3 % (ref 0–8)
ERYTHROCYTE [DISTWIDTH] IN BLOOD BY AUTOMATED COUNT: 14.4 % (ref 11.5–14.5)
EST. GFR  (NO RACE VARIABLE): 54 ML/MIN/1.73 M^2
GLUCOSE SERPL-MCNC: 121 MG/DL (ref 70–110)
HCT VFR BLD AUTO: 39.6 % (ref 37–48.5)
HGB BLD-MCNC: 12.5 G/DL (ref 12–16)
IMM GRANULOCYTES # BLD AUTO: 0.01 K/UL (ref 0–0.04)
IMM GRANULOCYTES NFR BLD AUTO: 0.2 % (ref 0–0.5)
LYMPHOCYTES # BLD AUTO: 1 K/UL (ref 1–4.8)
LYMPHOCYTES NFR BLD: 18.3 % (ref 18–48)
MCH RBC QN AUTO: 25.5 PG (ref 27–31)
MCHC RBC AUTO-ENTMCNC: 31.6 G/DL (ref 32–36)
MCV RBC AUTO: 81 FL (ref 82–98)
MONOCYTES # BLD AUTO: 0.4 K/UL (ref 0.3–1)
MONOCYTES NFR BLD: 7.9 % (ref 4–15)
NEUTROPHILS # BLD AUTO: 3.8 K/UL (ref 1.8–7.7)
NEUTROPHILS NFR BLD: 69.9 % (ref 38–73)
NRBC BLD-RTO: 0 /100 WBC
PLATELET # BLD AUTO: 314 K/UL (ref 150–450)
PMV BLD AUTO: 9 FL (ref 9.2–12.9)
POTASSIUM SERPL-SCNC: 4.2 MMOL/L (ref 3.5–5.1)
PROT SERPL-MCNC: 6.9 G/DL (ref 6–8.4)
RBC # BLD AUTO: 4.91 M/UL (ref 4–5.4)
SODIUM SERPL-SCNC: 142 MMOL/L (ref 136–145)
WBC # BLD AUTO: 5.42 K/UL (ref 3.9–12.7)

## 2023-03-06 PROCEDURE — 85025 COMPLETE CBC W/AUTO DIFF WBC: CPT | Performed by: NURSE PRACTITIONER

## 2023-03-06 PROCEDURE — 93010 ELECTROCARDIOGRAM REPORT: CPT | Mod: ,,, | Performed by: INTERNAL MEDICINE

## 2023-03-06 PROCEDURE — 93010 EKG 12-LEAD: ICD-10-PCS | Mod: ,,, | Performed by: INTERNAL MEDICINE

## 2023-03-06 PROCEDURE — 80053 COMPREHEN METABOLIC PANEL: CPT | Performed by: NURSE PRACTITIONER

## 2023-03-06 PROCEDURE — 93005 ELECTROCARDIOGRAM TRACING: CPT

## 2023-03-06 NOTE — PROGRESS NOTES
To confirm, your doctor has instructed you that surgery is scheduled for 3/31/2023.       Pre admit office will call the afternoon prior to surgery between 1PM and 3PM with arrival time.    Surgery will be at [facility],  The address is [address]. [place]  [address].      IMPORTANT INSTRUCTIONS!    Do not eat or drink after 12 midnight, including water.   Do not smoke or use chewing tobacco after 12 midnight  OK to brush teeth, but no gum, candy, or mints!      Take only these medicines with a small swallow of water-morning of surgery.     Protonix & Effexor          ____ Stop Aspirin, Ibuprofen, Motrin and Aleve at least 5-7 days before surgery, unless otherwise instructed by your doctor, or the nurse.   You MAY use Tylenol/acetaminophen until day of surgery.      ____  If you take diabetic medication, do NOT take morning of surgery unless instructed by Doctor. Metformin must be stopped 24 hrs prior to surgery time.       ____ Stop taking any Fish Oil supplements or Vitamins at least 5 days prior to surgery, unless instructed otherwise by your Doctor.       Please notify MD office if you have an active infection, currently taking antibiotics or received a vaccination within the past 7 days.      Bathing Instructions: The night before surgery and the morning prior to coming to the hospital:    - Shower & rinse your body as usual with anti-bacterial Soap (Dial or Pj 2000)   -Hibiclens (if indicated) use AFTER anti-bacterial soap; 1 packet PM/1 packet in AM on surgical site only   -Do not use hibiclens on your head, face, or genitals.    -Do not wash with anti-bacterial soap after you use the hibiclens.    -Do not shave surgical site 5-7 days prior to surgery.    -Pubic hair 7 days prior to surgery (gyn pt's).      Pediatric patients do not need to use anti-bacterial soap or Hibiclens.             After Bathing:   __ No powder, lotions, creams, or body spray to skin     __No deodorant for  any breast procedure, PORT, or upper arm surgery     __ No makeup, mascara, nail polish or artificial nails        **SURGERY WILL BE CANCELLED IF ARTIFICIAL/NAIL POLISH IS PRESENT!!!**    __ Please remove all piercings and leave all jewelry at home.    **SURGERY WILL BE CANCELLED IF PIERCINGS ARE PRESENT!!!**      __ Dentures, Hearing Aids and Contact Lens need to be removed prior to the start of surgery.      __ Wear clean, loose-fitting clothing. Allow for dressings/bandages/surgical equipment     __ You must have transportation, and they MUST stay the entire time.       [facility] Visitor/Ride Policy:   Only 1 adult allowed (over the age of 18) to accompany you into Pre-op/Recovery Surgery Dept and must stay through the entire length of admission.     Must have a ride home from a responsible adult that you know and trust.      Pediatric Patients are allowed 2 adult visitors.     Medical Transport, Uber or Lyft can only be used if patient has a responsible adult to accompany them during ride home.         Post-Op Instructions: You will receive surgery post-op instructions by your Discharge Nurse prior to going home.     Surgical Site Infection:   Prevention of surgical site infections:   -Keep incisions clean and dry.   -Do not soak/submerge incisions in water until completely healed.   -Do not apply lotions, powders, creams, or deodorants to site.   -Always make sure hands are cleaned with antibacterial soap/ alcohol-based   prior to touching the surgical site.       Signs and symptoms:               -Redness and pain around the area where you had surgery               -Drainage of cloudy fluid from your surgical wound               -Fever over 100.4 or chills     >>>Call Surgeon office/on-call Surgeon if you experience any of these signs & symptoms post-surgery @ [phone].      *Please Call [facility] Pre-Admit Department for surgery instruction questions:  [phone]    *Payment  questions:  [phone]    *Billing questions:  [phone]

## 2023-03-06 NOTE — ASSESSMENT & PLAN NOTE
- Followed outpatient by the transplant team in [place].  - ECHO in November showed;   The patient is status post cardiac transplantation.   The left ventricle is normal in size with normal systolic function.   The estimated ejection fraction is 65%.   Normal left ventricular diastolic function.   Normal right ventricular size with normal right ventricular systolic function.   The estimated PA systolic pressure is 26 mmHg.   Normal central venous pressure (3 mmHg).    - Requested surgical clearance from Dr. Antunez in [facility] who evaluated patient in December.   - Continue outpatient f/u as directed.

## 2023-03-06 NOTE — DISCHARGE INSTRUCTIONS
To confirm, your doctor has instructed you that surgery is scheduled for 3/31/2023.       Pre admit office will call the afternoon prior to surgery between 1PM and 3PM with arrival time.    Surgery will be at [facility],  The address is [address]. [place]  [address].      IMPORTANT INSTRUCTIONS!    Do not eat or drink after 12 midnight, including water.   Do not smoke or use chewing tobacco after 12 midnight  OK to brush teeth, but no gum, candy, or mints!      Take only these medicines with a small swallow of water-morning of surgery.     Protonix & Effexor          ____ Stop Aspirin, Ibuprofen, Motrin and Aleve at least 5-7 days before surgery, unless otherwise instructed by your doctor, or the nurse.   You MAY use Tylenol/acetaminophen until day of surgery.      ____  If you take diabetic medication, do NOT take morning of surgery unless instructed by Doctor. Metformin must be stopped 24 hrs prior to surgery time.       ____ Stop taking any Fish Oil supplements or Vitamins at least 5 days prior to surgery, unless instructed otherwise by your Doctor.       Please notify MD office if you have an active infection, currently taking antibiotics or received a vaccination within the past 7 days.      Bathing Instructions: The night before surgery and the morning prior to coming to the hospital:    - Shower & rinse your body as usual with anti-bacterial Soap (Dial or Pj 2000)   -Hibiclens (if indicated) use AFTER anti-bacterial soap; 1 packet PM/1 packet in AM on surgical site only   -Do not use hibiclens on your head, face, or genitals.    -Do not wash with anti-bacterial soap after you use the hibiclens.    -Do not shave surgical site 5-7 days prior to surgery.    -Pubic hair 7 days prior to surgery (gyn pt's).      Pediatric patients do not need to use anti-bacterial soap or Hibiclens.             After Bathing:   __ No powder, lotions, creams, or body spray to skin     __No deodorant for  any breast procedure, PORT, or upper arm surgery     __ No makeup, mascara, nail polish or artificial nails        **SURGERY WILL BE CANCELLED IF ARTIFICIAL/NAIL POLISH IS PRESENT!!!**    __ Please remove all piercings and leave all jewelry at home.    **SURGERY WILL BE CANCELLED IF PIERCINGS ARE PRESENT!!!**      __ Dentures, Hearing Aids and Contact Lens need to be removed prior to the start of surgery.      __ Wear clean, loose-fitting clothing. Allow for dressings/bandages/surgical equipment     __ You must have transportation, and they MUST stay the entire time.       [facility] Visitor/Ride Policy:   Only 1 adult allowed (over the age of 18) to accompany you into Pre-op/Recovery Surgery Dept and must stay through the entire length of admission.     Must have a ride home from a responsible adult that you know and trust.      Pediatric Patients are allowed 2 adult visitors.     Medical Transport, Uber or Lyft can only be used if patient has a responsible adult to accompany them during ride home.         Post-Op Instructions: You will receive surgery post-op instructions by your Discharge Nurse prior to going home.     Surgical Site Infection:   Prevention of surgical site infections:   -Keep incisions clean and dry.   -Do not soak/submerge incisions in water until completely healed.   -Do not apply lotions, powders, creams, or deodorants to site.   -Always make sure hands are cleaned with antibacterial soap/ alcohol-based   prior to touching the surgical site.       Signs and symptoms:               -Redness and pain around the area where you had surgery               -Drainage of cloudy fluid from your surgical wound               -Fever over 100.4 or chills     >>>Call Surgeon office/on-call Surgeon if you experience any of these signs & symptoms post-surgery @ [phone].      *Please Call [facility] Pre-Admit Department for surgery instruction questions:  [phone]    *Payment  questions:  [phone]    *Billing questions:  [phone]
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
## 2023-03-06 NOTE — ASSESSMENT & PLAN NOTE
- Patient presents today to request of Dr. Sumner who plans on performing a Ostectomy on March 31st.    Known risk factors for perioperative complications: H/O Transplanted heart on Immunosuppressants.    Difficulty with intubation is not anticipated.    Cardiac Risk Estimation: Based on the Revised Cardiac Risk index, patient is a Class 1 risk with a 3.9% risk of a major cardiac event in a low risk procedure.    1.) Preoperative workup as follows: ECG, hemoglobin, hematocrit, electrolytes, creatinine, glucose, liver function studies.  2.) Change in medication regimen before surgery: discontinue ASA 6 days before surgery, discontinue NSAIDs 5 days before surgery.  3.) Prophylaxis for cardiac events with perioperative beta-blockers: not indicated.  4.) Invasive hemodynamic monitoring perioperatively: not indicated.  5.) Deep vein thrombosis prophylaxis postoperatively: intermittent pneumatic compression boots and regimen to be chosen by surgical team.  6.) Surveillance for postoperative MI with ECG immediately postoperatively and on postoperati ve days 1 and 2 AND troponin levels 24 hours postoperatively and on day 4 or hospital discharge (whichever comes first): not indicated.  7.) Current medications which may produce withdrawal symptoms if withheld perioperatively: None.  8.) Other measures: None.

## 2023-03-13 ENCOUNTER — PATIENT MESSAGE (OUTPATIENT)
Dept: TRANSPLANT | Facility: CLINIC | Age: 53
End: 2023-03-13
Payer: COMMERCIAL

## 2023-03-16 ENCOUNTER — TELEPHONE (OUTPATIENT)
Dept: TRANSPLANT | Facility: CLINIC | Age: 53
End: 2023-03-16
Payer: COMMERCIAL

## 2023-03-16 DIAGNOSIS — D86.85 CARDIAC SARCOIDOSIS: ICD-10-CM

## 2023-03-16 DIAGNOSIS — Z94.1 S/P ORTHOTOPIC HEART TRANSPLANT: Primary | ICD-10-CM

## 2023-03-16 DIAGNOSIS — D84.821 IMMUNODEFICIENCY DUE TO TREATMENT WITH IMMUNOSUPPRESSIVE MEDICATION: ICD-10-CM

## 2023-03-16 DIAGNOSIS — Z79.899 IMMUNODEFICIENCY DUE TO TREATMENT WITH IMMUNOSUPPRESSIVE MEDICATION: ICD-10-CM

## 2023-03-16 DIAGNOSIS — Z79.899 IMMUNOCOMPROMISED STATE DUE TO DRUG THERAPY: ICD-10-CM

## 2023-03-16 DIAGNOSIS — D84.821 IMMUNOCOMPROMISED STATE DUE TO DRUG THERAPY: ICD-10-CM

## 2023-03-16 RX ORDER — TACROLIMUS 1 MG/1
3 CAPSULE ORAL EVERY 12 HOURS
Qty: 180 CAPSULE | Refills: 3 | Status: SHIPPED | OUTPATIENT
Start: 2023-03-16 | End: 2023-03-17 | Stop reason: SDUPTHER

## 2023-03-16 NOTE — TELEPHONE ENCOUNTER
I was asked to provide clearance for MAC anesthesia and performance of ostectomy of the left lesser toe.  Her current immunosuppression is CellCept 1000 mg twice daily, sirolimus 3 mg daily and prednisone 5 mg daily (on prednisone more for sarcoidosis involving the heart prior to transplant).  Prior to going on to sirolimus she was taking tacrolimus 3 mg twice daily.    I have spoken with her tonight and asked her to start tacrolimus 3 mg twice daily tomorrow to replace sirolimus.    I will speak with Martha Sumner and Juan Pablo regarding duration off sirolimus (up to 6 weeks) we feel best and when to resume post-op.      Displaced fracture of proximal phalanx of left lesser toe(s), subsequent encounter for fracture with delayed healing  -     Case Request Operating Room: OSTECTOMY      03/17/2023 addendum 5:46 p.m.    I spoke with her tonight and informed her that we wanted to stop the sirolimus for a full 6 weeks prior to undergoing surgery.  She will stay on sirolimus until the tacrolimus comes in and I am sending a prescription to her mail order pharmacy for a dose of 3 mg twice daily.  She will get a BMP and a tacrolimus level 7-10 days after starting this medication.    I have not reach Dr. Sumner so I am sending her a copy of this note and have also sent her epic chat again today updating yesterday's [employer] chat with the above information and plan.      She is not cleared for anesthesia and surgery

## 2023-03-17 RX ORDER — TACROLIMUS 1 MG/1
3 CAPSULE ORAL EVERY 12 HOURS
Qty: 540 CAPSULE | Refills: 3 | Status: SHIPPED | OUTPATIENT
Start: 2023-03-17 | End: 2023-08-11

## 2023-03-20 NOTE — TELEPHONE ENCOUNTER
Good afternoon,    The surgery would consist of deep dissection to excise avulsed area of bone. This is not an emergent case and can be done when you believe optimal healing can be achieved. So will it be 6 weeks from when she starts the Tacrolimus?     Dr. Sumner

## 2023-03-24 ENCOUNTER — PATIENT MESSAGE (OUTPATIENT)
Dept: SURGERY | Facility: HOSPITAL | Age: 53
End: 2023-03-24
Payer: COMMERCIAL

## 2023-04-06 ENCOUNTER — PATIENT MESSAGE (OUTPATIENT)
Dept: TRANSPLANT | Facility: CLINIC | Age: 53
End: 2023-04-06
Payer: COMMERCIAL

## 2023-04-17 ENCOUNTER — PATIENT MESSAGE (OUTPATIENT)
Dept: SURGERY | Facility: HOSPITAL | Age: 53
End: 2023-04-17
Payer: COMMERCIAL

## 2023-04-17 ENCOUNTER — PATIENT MESSAGE (OUTPATIENT)
Dept: TRANSPLANT | Facility: CLINIC | Age: 53
End: 2023-04-17
Payer: COMMERCIAL

## 2023-04-17 DIAGNOSIS — Z94.1 HEART TRANSPLANTED: Primary | ICD-10-CM

## 2023-04-21 ENCOUNTER — LAB VISIT (OUTPATIENT)
Dept: LAB | Facility: HOSPITAL | Age: 53
End: 2023-04-21
Attending: FAMILY MEDICINE
Payer: COMMERCIAL

## 2023-04-21 DIAGNOSIS — Z94.1 HEART TRANSPLANTED: ICD-10-CM

## 2023-04-21 LAB
ANION GAP SERPL CALC-SCNC: 14 MMOL/L (ref 8–16)
BUN SERPL-MCNC: 30 MG/DL (ref 6–20)
CALCIUM SERPL-MCNC: 9.8 MG/DL (ref 8.7–10.5)
CHLORIDE SERPL-SCNC: 106 MMOL/L (ref 95–110)
CO2 SERPL-SCNC: 24 MMOL/L (ref 23–29)
CREAT SERPL-MCNC: 1.1 MG/DL (ref 0.5–1.4)
EST. GFR  (NO RACE VARIABLE): >60 ML/MIN/1.73 M^2
GLUCOSE SERPL-MCNC: 106 MG/DL (ref 70–110)
POTASSIUM SERPL-SCNC: 4.4 MMOL/L (ref 3.5–5.1)
SODIUM SERPL-SCNC: 144 MMOL/L (ref 136–145)

## 2023-04-21 PROCEDURE — 36415 COLL VENOUS BLD VENIPUNCTURE: CPT | Performed by: INTERNAL MEDICINE

## 2023-04-21 PROCEDURE — 80048 BASIC METABOLIC PNL TOTAL CA: CPT | Performed by: INTERNAL MEDICINE

## 2023-04-21 PROCEDURE — 80197 ASSAY OF TACROLIMUS: CPT | Performed by: INTERNAL MEDICINE

## 2023-04-22 LAB — TACROLIMUS BLD-MCNC: 7.7 NG/ML (ref 5–15)

## 2023-05-24 ENCOUNTER — TELEPHONE (OUTPATIENT)
Dept: CARDIOLOGY | Facility: HOSPITAL | Age: 53
End: 2023-05-24
Payer: COMMERCIAL

## 2023-05-28 ENCOUNTER — TELEPHONE (OUTPATIENT)
Dept: TRANSPLANT | Facility: CLINIC | Age: 53
End: 2023-05-28
Payer: COMMERCIAL

## 2023-05-28 NOTE — TELEPHONE ENCOUNTER
She has been off sirolimus and on tacro in preparation for this surgery to avoid wound healing issues.  She is cleared for A&S.

## 2023-05-30 ENCOUNTER — PATIENT MESSAGE (OUTPATIENT)
Dept: PREADMISSION TESTING | Facility: HOSPITAL | Age: 53
End: 2023-05-30
Payer: COMMERCIAL

## 2023-05-30 ENCOUNTER — ANESTHESIA EVENT (OUTPATIENT)
Dept: SURGERY | Facility: HOSPITAL | Age: 53
End: 2023-05-30
Payer: COMMERCIAL

## 2023-05-30 ENCOUNTER — OFFICE VISIT (OUTPATIENT)
Dept: INTERNAL MEDICINE | Facility: CLINIC | Age: 53
End: 2023-05-30
Payer: COMMERCIAL

## 2023-05-30 VITALS
HEART RATE: 104 BPM | RESPIRATION RATE: 15 BRPM | SYSTOLIC BLOOD PRESSURE: 132 MMHG | OXYGEN SATURATION: 97 % | DIASTOLIC BLOOD PRESSURE: 84 MMHG | TEMPERATURE: 97 F

## 2023-05-30 DIAGNOSIS — E78.2 MIXED HYPERLIPIDEMIA: ICD-10-CM

## 2023-05-30 DIAGNOSIS — Z01.818 PRE-OP EXAM: ICD-10-CM

## 2023-05-30 DIAGNOSIS — S92.512G: ICD-10-CM

## 2023-05-30 DIAGNOSIS — E04.2 MULTINODULAR GOITER: ICD-10-CM

## 2023-05-30 DIAGNOSIS — N18.31 STAGE 3A CHRONIC KIDNEY DISEASE: ICD-10-CM

## 2023-05-30 DIAGNOSIS — Z94.1 HEART TRANSPLANTED: ICD-10-CM

## 2023-05-30 LAB
ALBUMIN SERPL BCP-MCNC: 3.9 G/DL (ref 3.5–5.2)
ALP SERPL-CCNC: 77 U/L (ref 55–135)
ALT SERPL W/O P-5'-P-CCNC: 11 U/L (ref 10–44)
ANION GAP SERPL CALC-SCNC: 12 MMOL/L (ref 8–16)
AST SERPL-CCNC: 15 U/L (ref 10–40)
BASOPHILS # BLD AUTO: 0.04 K/UL (ref 0–0.2)
BASOPHILS NFR BLD: 0.7 % (ref 0–1.9)
BILIRUB SERPL-MCNC: 0.4 MG/DL (ref 0.1–1)
BUN SERPL-MCNC: 25 MG/DL (ref 6–20)
CALCIUM SERPL-MCNC: 9.3 MG/DL (ref 8.7–10.5)
CHLORIDE SERPL-SCNC: 107 MMOL/L (ref 95–110)
CO2 SERPL-SCNC: 23 MMOL/L (ref 23–29)
CREAT SERPL-MCNC: 1.2 MG/DL (ref 0.5–1.4)
DIFFERENTIAL METHOD: ABNORMAL
EOSINOPHIL # BLD AUTO: 0.2 K/UL (ref 0–0.5)
EOSINOPHIL NFR BLD: 3.4 % (ref 0–8)
ERYTHROCYTE [DISTWIDTH] IN BLOOD BY AUTOMATED COUNT: 15.8 % (ref 11.5–14.5)
EST. GFR  (NO RACE VARIABLE): 54 ML/MIN/1.73 M^2
GLUCOSE SERPL-MCNC: 118 MG/DL (ref 70–110)
HCT VFR BLD AUTO: 43.5 % (ref 37–48.5)
HGB BLD-MCNC: 13.9 G/DL (ref 12–16)
IMM GRANULOCYTES # BLD AUTO: 0.01 K/UL (ref 0–0.04)
IMM GRANULOCYTES NFR BLD AUTO: 0.2 % (ref 0–0.5)
LYMPHOCYTES # BLD AUTO: 1.1 K/UL (ref 1–4.8)
LYMPHOCYTES NFR BLD: 18.9 % (ref 18–48)
MCH RBC QN AUTO: 26.7 PG (ref 27–31)
MCHC RBC AUTO-ENTMCNC: 32 G/DL (ref 32–36)
MCV RBC AUTO: 84 FL (ref 82–98)
MONOCYTES # BLD AUTO: 0.5 K/UL (ref 0.3–1)
MONOCYTES NFR BLD: 8 % (ref 4–15)
NEUTROPHILS # BLD AUTO: 3.9 K/UL (ref 1.8–7.7)
NEUTROPHILS NFR BLD: 68.8 % (ref 38–73)
NRBC BLD-RTO: 0 /100 WBC
PLATELET # BLD AUTO: 312 K/UL (ref 150–450)
PMV BLD AUTO: 8.8 FL (ref 9.2–12.9)
POTASSIUM SERPL-SCNC: 4.4 MMOL/L (ref 3.5–5.1)
PROT SERPL-MCNC: 6.6 G/DL (ref 6–8.4)
RBC # BLD AUTO: 5.2 M/UL (ref 4–5.4)
SODIUM SERPL-SCNC: 142 MMOL/L (ref 136–145)
WBC # BLD AUTO: 5.61 K/UL (ref 3.9–12.7)

## 2023-05-30 PROCEDURE — 80053 COMPREHEN METABOLIC PANEL: CPT | Performed by: PHYSICIAN ASSISTANT

## 2023-05-30 PROCEDURE — 99999 PR PBB SHADOW E&M-EST. PATIENT-LVL V: CPT | Mod: PBBFAC,,,

## 2023-05-30 PROCEDURE — 85025 COMPLETE CBC W/AUTO DIFF WBC: CPT | Performed by: PHYSICIAN ASSISTANT

## 2023-05-30 PROCEDURE — 99999 PR PBB SHADOW E&M-EST. PATIENT-LVL V: ICD-10-PCS | Mod: PBBFAC,,,

## 2023-05-30 RX ORDER — AMOXICILLIN 500 MG
CAPSULE ORAL DAILY
COMMUNITY

## 2023-05-30 RX ORDER — CEFAZOLIN SODIUM 1 G/50ML
1 SOLUTION INTRAVENOUS ONCE
Status: CANCELLED | OUTPATIENT
Start: 2023-05-30

## 2023-05-30 NOTE — ASSESSMENT & PLAN NOTE
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 Maloney , cleared for anesthesia and surgery   - Echo 12/2022 above

## 2023-05-30 NOTE — ASSESSMENT & PLAN NOTE
- no dysphagia , no SOB when supine  - monitored annually    63 y/o female c/o epigastric pain and vomiting.

## 2023-05-30 NOTE — ASSESSMENT & PLAN NOTE
Patient presents at the request of Dr. Sumner who plans on performing an ostectomy of left lesser toe on May 31st  Known risk factors for perioperative complications: s/p heart transplant, cardiac sarcoidosis , CKD 3    Difficulty with intubation is not anticipated.    Cardiac Risk Estimation:  Per Revised Cardiac Risk Index patient is a Class I  risk with a 3.9% risk of a major cardiac event.      1.) Preoperative workup as follows: ECG, hemoglobin, hematocrit, electrolytes, creatinine, glucose, liver function studies.  2.) Change in medication regimen before surgery: hodl NSaids 7 days pre op.  3.) Prophylaxis for cardiac events with perioperative beta-blockers: not indicated.  4.) Invasive hemodynamic monitoring perioperatively: not indicated.  5.) Deep vein thrombosis prophylaxis postoperatively: intermittent pneumatic compression boots and regimen to be chosen by surgical team.  6.) Surveillance for postoperative MI with ECG immediately postoperatively and on postoperati ve days 1 and 2 AND troponin levels 24 hours postoperatively and on day 4 or hospital discharge (whichever comes first): not indicated.  7.) Current medications which may produce withdrawal symptoms if withheld perioperatively: none  8.) Other measures: Pt cleared by transplant team Dr. Maloney via phone conversation on 5/28/23
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
## 2023-05-31 ENCOUNTER — ANESTHESIA (OUTPATIENT)
Dept: SURGERY | Facility: HOSPITAL | Age: 53
End: 2023-05-31
Payer: COMMERCIAL

## 2023-05-31 ENCOUNTER — HOSPITAL ENCOUNTER (OUTPATIENT)
Facility: HOSPITAL | Age: 53
Discharge: HOME | End: 2023-05-31
Attending: PODIATRIST | Admitting: PODIATRIST
Payer: COMMERCIAL

## 2023-05-31 ENCOUNTER — HOSPITAL ENCOUNTER (OUTPATIENT)
Dept: RADIOLOGY | Facility: HOSPITAL | Age: 53
Discharge: HOME | End: 2023-05-31
Attending: PODIATRIST | Admitting: PODIATRIST
Payer: COMMERCIAL

## 2023-05-31 VITALS
HEIGHT: 68 IN | BODY MASS INDEX: 33.84 KG/M2 | TEMPERATURE: 98 F | HEART RATE: 79 BPM | SYSTOLIC BLOOD PRESSURE: 100 MMHG | WEIGHT: 223.31 LBS | OXYGEN SATURATION: 99 % | DIASTOLIC BLOOD PRESSURE: 63 MMHG | RESPIRATION RATE: 17 BRPM

## 2023-05-31 DIAGNOSIS — Z01.818 PRE-OP EXAM: Primary | ICD-10-CM

## 2023-05-31 DIAGNOSIS — S92.512G: ICD-10-CM

## 2023-05-31 DIAGNOSIS — S92.512A DISPLACED FRACTURE OF PROXIMAL PHALANX OF LEFT LESSER TOE(S), INITIAL ENCOUNTER FOR CLOSED FRACTURE: ICD-10-CM

## 2023-05-31 PROCEDURE — 27201423 OPTIME MED/SURG SUP & DEVICES STERILE SUPPLY: Performed by: PODIATRIST

## 2023-05-31 PROCEDURE — 73630 XR FOOT COMPLETE 3 VIEW LEFT: ICD-10-PCS | Mod: 26,LT,, | Performed by: RADIOLOGY

## 2023-05-31 PROCEDURE — 37000009 HC ANESTHESIA EA ADD 15 MINS: Performed by: PODIATRIST

## 2023-05-31 PROCEDURE — 25000003 PHARM REV CODE 250: Performed by: PODIATRIST

## 2023-05-31 PROCEDURE — 73630 X-RAY EXAM OF FOOT: CPT | Mod: 26,LT,, | Performed by: RADIOLOGY

## 2023-05-31 PROCEDURE — 36000706: Performed by: PODIATRIST

## 2023-05-31 PROCEDURE — 28124 PR PART REMV PHALANX OF TOE: ICD-10-PCS | Mod: T4,,, | Performed by: PODIATRIST

## 2023-05-31 PROCEDURE — 71000015 HC POSTOP RECOV 1ST HR: Performed by: PODIATRIST

## 2023-05-31 PROCEDURE — D9220A PRA ANESTHESIA: Mod: ,,, | Performed by: NURSE ANESTHETIST, CERTIFIED REGISTERED

## 2023-05-31 PROCEDURE — 63600175 PHARM REV CODE 636 W HCPCS: Performed by: PODIATRIST

## 2023-05-31 PROCEDURE — 28124 PARTIAL REMOVAL OF TOE: CPT | Mod: T4,,, | Performed by: PODIATRIST

## 2023-05-31 PROCEDURE — 71000033 HC RECOVERY, INTIAL HOUR: Performed by: PODIATRIST

## 2023-05-31 PROCEDURE — 25000003 PHARM REV CODE 250: Performed by: NURSE ANESTHETIST, CERTIFIED REGISTERED

## 2023-05-31 PROCEDURE — D9220A PRA ANESTHESIA: ICD-10-PCS | Mod: ,,, | Performed by: NURSE ANESTHETIST, CERTIFIED REGISTERED

## 2023-05-31 PROCEDURE — 73630 X-RAY EXAM OF FOOT: CPT | Mod: TC,LT

## 2023-05-31 PROCEDURE — 63600175 PHARM REV CODE 636 W HCPCS: Performed by: ANESTHESIOLOGY

## 2023-05-31 PROCEDURE — 37000008 HC ANESTHESIA 1ST 15 MINUTES: Performed by: PODIATRIST

## 2023-05-31 PROCEDURE — 63600175 PHARM REV CODE 636 W HCPCS: Performed by: NURSE ANESTHETIST, CERTIFIED REGISTERED

## 2023-05-31 PROCEDURE — 36000707: Performed by: PODIATRIST

## 2023-05-31 RX ORDER — DEXAMETHASONE SODIUM PHOSPHATE 4 MG/ML
INJECTION, SOLUTION INTRA-ARTICULAR; INTRALESIONAL; INTRAMUSCULAR; INTRAVENOUS; SOFT TISSUE
Status: DISCONTINUED
Start: 2023-05-31 | End: 2023-05-31 | Stop reason: HOSPADM

## 2023-05-31 RX ORDER — ONDANSETRON 2 MG/ML
4 INJECTION INTRAMUSCULAR; INTRAVENOUS ONCE AS NEEDED
Status: DISCONTINUED | OUTPATIENT
Start: 2023-05-31 | End: 2023-06-05 | Stop reason: HOSPADM

## 2023-05-31 RX ORDER — MIDAZOLAM HYDROCHLORIDE 1 MG/ML
INJECTION, SOLUTION INTRAMUSCULAR; INTRAVENOUS
Status: DISCONTINUED | OUTPATIENT
Start: 2023-05-31 | End: 2023-05-31

## 2023-05-31 RX ORDER — PROPOFOL 10 MG/ML
VIAL (ML) INTRAVENOUS
Status: DISCONTINUED | OUTPATIENT
Start: 2023-05-31 | End: 2023-05-31

## 2023-05-31 RX ORDER — SODIUM CHLORIDE, SODIUM LACTATE, POTASSIUM CHLORIDE, CALCIUM CHLORIDE 600; 310; 30; 20 MG/100ML; MG/100ML; MG/100ML; MG/100ML
INJECTION, SOLUTION INTRAVENOUS CONTINUOUS
Status: DISCONTINUED | OUTPATIENT
Start: 2023-05-31 | End: 2023-06-05 | Stop reason: HOSPADM

## 2023-05-31 RX ORDER — LIDOCAINE HYDROCHLORIDE 10 MG/ML
INJECTION, SOLUTION EPIDURAL; INFILTRATION; INTRACAUDAL; PERINEURAL
Status: DISCONTINUED
Start: 2023-05-31 | End: 2023-05-31 | Stop reason: HOSPADM

## 2023-05-31 RX ORDER — BUPIVACAINE HYDROCHLORIDE 5 MG/ML
INJECTION, SOLUTION EPIDURAL; INTRACAUDAL
Status: DISCONTINUED
Start: 2023-05-31 | End: 2023-05-31 | Stop reason: HOSPADM

## 2023-05-31 RX ORDER — LIDOCAINE HYDROCHLORIDE 10 MG/ML
INJECTION, SOLUTION EPIDURAL; INFILTRATION; INTRACAUDAL; PERINEURAL
Status: DISCONTINUED | OUTPATIENT
Start: 2023-05-31 | End: 2023-05-31 | Stop reason: HOSPADM

## 2023-05-31 RX ORDER — FENTANYL CITRATE 50 UG/ML
25 INJECTION, SOLUTION INTRAMUSCULAR; INTRAVENOUS EVERY 5 MIN PRN
Status: DISCONTINUED | OUTPATIENT
Start: 2023-05-31 | End: 2023-06-05 | Stop reason: HOSPADM

## 2023-05-31 RX ORDER — OXYCODONE AND ACETAMINOPHEN 5; 325 MG/1; MG/1
1 TABLET ORAL EVERY 6 HOURS PRN
Qty: 15 TABLET | Refills: 0 | Status: SHIPPED | OUTPATIENT
Start: 2023-05-31

## 2023-05-31 RX ORDER — MEPERIDINE HYDROCHLORIDE 25 MG/ML
12.5 INJECTION INTRAMUSCULAR; INTRAVENOUS; SUBCUTANEOUS ONCE
Status: DISCONTINUED | OUTPATIENT
Start: 2023-05-31 | End: 2023-06-01 | Stop reason: HOSPADM

## 2023-05-31 RX ORDER — BUPIVACAINE HYDROCHLORIDE 5 MG/ML
INJECTION, SOLUTION EPIDURAL; INTRACAUDAL
Status: DISCONTINUED | OUTPATIENT
Start: 2023-05-31 | End: 2023-05-31 | Stop reason: HOSPADM

## 2023-05-31 RX ORDER — AMOXICILLIN 250 MG
1 CAPSULE ORAL DAILY
COMMUNITY
Start: 2023-05-31

## 2023-05-31 RX ORDER — ONDANSETRON 2 MG/ML
INJECTION INTRAMUSCULAR; INTRAVENOUS
Status: DISCONTINUED | OUTPATIENT
Start: 2023-05-31 | End: 2023-05-31

## 2023-05-31 RX ORDER — LIDOCAINE HYDROCHLORIDE 20 MG/ML
INJECTION, SOLUTION EPIDURAL; INFILTRATION; INTRACAUDAL; PERINEURAL
Status: DISCONTINUED | OUTPATIENT
Start: 2023-05-31 | End: 2023-05-31

## 2023-05-31 RX ORDER — PROPOFOL 10 MG/ML
VIAL (ML) INTRAVENOUS CONTINUOUS PRN
Status: DISCONTINUED | OUTPATIENT
Start: 2023-05-31 | End: 2023-05-31

## 2023-05-31 RX ORDER — DIPHENHYDRAMINE HYDROCHLORIDE 50 MG/ML
25 INJECTION INTRAMUSCULAR; INTRAVENOUS EVERY 6 HOURS PRN
Status: DISCONTINUED | OUTPATIENT
Start: 2023-05-31 | End: 2023-06-05 | Stop reason: HOSPADM

## 2023-05-31 RX ORDER — DEXAMETHASONE SODIUM PHOSPHATE 4 MG/ML
INJECTION, SOLUTION INTRA-ARTICULAR; INTRALESIONAL; INTRAMUSCULAR; INTRAVENOUS; SOFT TISSUE
Status: DISCONTINUED | OUTPATIENT
Start: 2023-05-31 | End: 2023-05-31 | Stop reason: HOSPADM

## 2023-05-31 RX ORDER — FENTANYL CITRATE 50 UG/ML
INJECTION, SOLUTION INTRAMUSCULAR; INTRAVENOUS
Status: DISCONTINUED | OUTPATIENT
Start: 2023-05-31 | End: 2023-05-31

## 2023-05-31 RX ORDER — DEXMEDETOMIDINE HYDROCHLORIDE 100 UG/ML
INJECTION, SOLUTION INTRAVENOUS
Status: DISCONTINUED | OUTPATIENT
Start: 2023-05-31 | End: 2023-05-31

## 2023-05-31 RX ORDER — HYDROCODONE BITARTRATE AND ACETAMINOPHEN 5; 325 MG/1; MG/1
1 TABLET ORAL EVERY 4 HOURS PRN
Status: DISCONTINUED | OUTPATIENT
Start: 2023-05-31 | End: 2023-06-05 | Stop reason: HOSPADM

## 2023-05-31 RX ADMIN — LIDOCAINE HYDROCHLORIDE 40 MG: 20 INJECTION, SOLUTION EPIDURAL; INFILTRATION; INTRACAUDAL; PERINEURAL at 07:05

## 2023-05-31 RX ADMIN — DEXMEDETOMIDINE HYDROCHLORIDE 4 MCG: 100 INJECTION, SOLUTION INTRAVENOUS at 07:05

## 2023-05-31 RX ADMIN — SODIUM CHLORIDE, POTASSIUM CHLORIDE, SODIUM LACTATE AND CALCIUM CHLORIDE: 600; 310; 30; 20 INJECTION, SOLUTION INTRAVENOUS at 06:05

## 2023-05-31 RX ADMIN — MIDAZOLAM 2 MG: 1 INJECTION INTRAMUSCULAR; INTRAVENOUS at 07:05

## 2023-05-31 RX ADMIN — DEXTROSE 1 G: 50 INJECTION, SOLUTION INTRAVENOUS at 07:05

## 2023-05-31 RX ADMIN — PROPOFOL 80 MG: 10 INJECTION, EMULSION INTRAVENOUS at 07:05

## 2023-05-31 RX ADMIN — FENTANYL CITRATE 50 MCG: 50 INJECTION, SOLUTION INTRAMUSCULAR; INTRAVENOUS at 07:05

## 2023-05-31 RX ADMIN — PROPOFOL 75 MCG/KG/MIN: 10 INJECTION, EMULSION INTRAVENOUS at 07:05

## 2023-05-31 RX ADMIN — DEXMEDETOMIDINE HYDROCHLORIDE 4 MCG: 100 INJECTION, SOLUTION INTRAVENOUS at 08:05

## 2023-05-31 RX ADMIN — ONDANSETRON 4 MG: 2 INJECTION INTRAMUSCULAR; INTRAVENOUS at 08:05

## 2023-05-31 NOTE — PATIENT INSTRUCTIONS
Keep dressing dry, clean, and intact on left foot. Do not get left foot wet. Rest and elevate left foot for first 24-48 hours after surgery. Ambulate with surgical shoe to left foot. (or non-weight bearing with crutches).  Take pain medication as prescribed.   Follow written post operative instructions as given on last podiatry visit. Use contact information for Dr. Debbie Sumner given on written post operative instructions.

## 2023-05-31 NOTE — BRIEF OP NOTE
[facility] - Periop Services  Brief Operative Note    Surgery Date: 5/31/2023     Surgeon(s) and Role:     * Debbie Sumner DPM - Primary    Assisting Surgeon: None    Pre-op Diagnosis:  Displaced fracture of proximal phalanx of left lesser toe(s), subsequent encounter for fracture with delayed healing [S92.512G]    Post-op Diagnosis:  Post-Op Diagnosis Codes:     * Displaced fracture of proximal phalanx of left lesser toe(s), subsequent encounter for fracture with delayed healing [S92.512G]    Procedure(s) (LRB):  OSTECTOMY (Left)    Anesthesia: Local MAC    Operative Findings: Bony exostosis as base of left proximal phalanx    Estimated Blood Loss: * recorded between 5/31/2023  7:20 AM and 5/31/2023  8:12 AM *cc         Specimens:   Specimen (24h ago, onward)      None              Discharge Note    OUTCOME: Patient tolerated treatment/procedure well without complication and is now ready for discharge.    DISPOSITION: Home or Self Care    FINAL DIAGNOSIS:  <principal problem not specified>    FOLLOWUP: In clinic    DISCHARGE INSTRUCTIONS:    Discharge Procedure Orders   Ambulatory referral/consult to Pre-Admit   Standing Status: Future   Referral Priority: Routine Referral Type: Consultation   Referral Reason: Specialty Services Required   Requested Specialty: Internal Medicine   Number of Visits Requested: 1
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
## 2023-05-31 NOTE — OP NOTE
Pt: eDborah Navas  : 1970  MR# [id number]  DOS: 2023  Surgeon: Dr. Debbie ANTUNEZPDANIKA  Assistant: None  Pre-Op Dx: Painful Left Displaced Fifth Proximal Phalanx Fracture   Post-Op Dx: Painful Left Displaced Fifth Proximal Phalanx Fracture   Procedure: Left Proximal Phalanx Fracture Ostectomy   Anesthesia: IV sedation with local anesthesia  Hemostasis: Pneumatic Left ankle tourniquet @ 250mmHg  EBL: 2cc  Materials: 3-0, 4-0 vicryl, 4-0 nylon  Injectables: pre-op: 1:1 mixture of 10cc of 1% Lidocaine plain and 0.5%                          Marcaine plain                         post-op: 5cc of 0.5% Marcaine plain and 1cc of dexamethasone                          phosphate 4mg/ml  Findings: Osteophytic changes noted to base of left 5th proximal phalanx    Procedure in Detail:    Patient was brought into operating room and placed on operating table in the supine position. A pneumatic ankle tourniquet was then place about the patients Left ankle. Following IV sedation, local anesthesia was obtained about the patients Left 5th ray utilizing 1:1 mixture of 10cc of 1% Lidocaine plain and 0.5% Marcaine plain. The Left foot was then scrubbed, prepped, and draped in the usual aseptic manner. An esmarch bandage was used to exsanguinate the Left foot and the pneumatic ankle tourniquet was then inflated.    Attention was then directed to the Left dorsal aspect of the 5th metatarsal, where a linear longitudinal incision was made lateraland parallel to the extensor tendon. The incision was deepened through the subcutaneous tissue utilizing sharp and blunt dissection, with care taken to identify and retract all vital neural and vascular structures. All bleeders were ligated and cauterized as necessary. At this time, the periosteal and capsular structures were carefully dissected free of their osseous attachments at the head of the 5th metatarsal and base of proximal phalanx, and reflected medially and  laterally, thus exposing the head of the 5th metatarsal and base of the proximal phalanx. While exposing the lateral base of the proximal phalanx a loose bone fragment of the proximal was removed utilizing at 15 blade intoto and passed from the operative field. Next, utilizing a rongeur the rough edges of the base of the proximal phalanx prominence was resected and passed from the operative field. All rough edges were then smoothed with a bone rasp.   The correction of the deformity was assessed at this time and noted to be in good alignment. Range of motion of the 5th MPJ appeared to increased. The wound was then irrigated copiously with sterile normal saline. The periosteal and capsular structure was reapproximated and coapted utilizing 3-0 vicryl. All skin was then reapproximated utilizing 4-0 nylon in horizontal suture technique.   Upon completion of the procedure a postoperative block consisting of 5cc of 0.5% Marcaine plain and 1cc of Dexamethasone phosphate was injected about the incison site.The wounds were then dressed with Betadine soaked adaptic, gauze, shannon, and ACE.  At this time, a pneumatic ankle tourniquet was then deflated and a prompt hyperemic response was noted to all toes of the Left foot. The patient tolerated anesthesia and procedure well. The patient was transported to PACU with vital signs stable and neurovascular status intact to the Left foot.

## 2023-05-31 NOTE — TRANSFER OF CARE
"Anesthesia Transfer of Care Note    Patient: Deborah Navas    Procedure(s) Performed: Procedure(s) (LRB):  OSTECTOMY (Left)    Patient location: PACU    Anesthesia Type: general    Transport from OR: Transported from OR on room air with adequate spontaneous ventilation    Post pain: adequate analgesia    Post assessment: no apparent anesthetic complications and tolerated procedure well    Post vital signs: stable    Level of consciousness: awake    Nausea/Vomiting: no nausea/vomiting    Complications: none    Transfer of care protocol was followed      Last vitals:   Visit Vitals  /87 (BP Location: Right arm, Patient Position: Sitting)   Pulse (!) 111   Temp 36.4 °C (97.5 °F) (Temporal)   Resp 16   Ht 5' 8" (1.727 m)   Wt 101.3 kg (223 lb 5.2 oz)   LMP  (LMP Unknown)   SpO2 96%   Breastfeeding No   BMI 33.96 kg/m²     "

## 2023-05-31 NOTE — H&P
This 52 year old examined 05/30/2023 by Sharmin Whitley PA-C for pre op History and physical.  Transplant physician Eric Antunez MD(cardiology) cleared patient for anesthesia and surgery 05/28/2023. Both notes charted.   I agree with proceeding with surgery 05/31/2023      Debbie Sumner DPM

## 2023-06-08 ENCOUNTER — OFFICE VISIT (OUTPATIENT)
Dept: PODIATRY | Facility: CLINIC | Age: 53
End: 2023-06-08
Payer: COMMERCIAL

## 2023-06-08 VITALS — WEIGHT: 223.31 LBS | HEIGHT: 68 IN | BODY MASS INDEX: 33.84 KG/M2

## 2023-06-08 DIAGNOSIS — Z98.890 S/P FOOT SURGERY, LEFT: Primary | ICD-10-CM

## 2023-06-08 DIAGNOSIS — S92.512G: ICD-10-CM

## 2023-06-08 PROCEDURE — 99024 POSTOP FOLLOW-UP VISIT: CPT | Mod: S$GLB,,, | Performed by: PODIATRIST

## 2023-06-08 PROCEDURE — 3008F BODY MASS INDEX DOCD: CPT | Mod: CPTII,S$GLB,, | Performed by: PODIATRIST

## 2023-06-08 PROCEDURE — 99999 PR PBB SHADOW E&M-EST. PATIENT-LVL IV: CPT | Mod: PBBFAC,,, | Performed by: PODIATRIST

## 2023-06-08 PROCEDURE — 99024 PR POST-OP FOLLOW-UP VISIT: ICD-10-PCS | Mod: S$GLB,,, | Performed by: PODIATRIST

## 2023-06-08 PROCEDURE — 4010F PR ACE/ARB THEARPY RXD/TAKEN: ICD-10-PCS | Mod: CPTII,S$GLB,, | Performed by: PODIATRIST

## 2023-06-08 PROCEDURE — 1160F RVW MEDS BY RX/DR IN RCRD: CPT | Mod: CPTII,S$GLB,, | Performed by: PODIATRIST

## 2023-06-08 PROCEDURE — 3008F PR BODY MASS INDEX (BMI) DOCUMENTED: ICD-10-PCS | Mod: CPTII,S$GLB,, | Performed by: PODIATRIST

## 2023-06-08 PROCEDURE — 4010F ACE/ARB THERAPY RXD/TAKEN: CPT | Mod: CPTII,S$GLB,, | Performed by: PODIATRIST

## 2023-06-08 PROCEDURE — 99999 PR PBB SHADOW E&M-EST. PATIENT-LVL IV: ICD-10-PCS | Mod: PBBFAC,,, | Performed by: PODIATRIST

## 2023-06-08 PROCEDURE — 1159F PR MEDICATION LIST DOCUMENTED IN MEDICAL RECORD: ICD-10-PCS | Mod: CPTII,S$GLB,, | Performed by: PODIATRIST

## 2023-06-08 PROCEDURE — 1160F PR REVIEW ALL MEDS BY PRESCRIBER/CLIN PHARMACIST DOCUMENTED: ICD-10-PCS | Mod: CPTII,S$GLB,, | Performed by: PODIATRIST

## 2023-06-08 PROCEDURE — 1159F MED LIST DOCD IN RCRD: CPT | Mod: CPTII,S$GLB,, | Performed by: PODIATRIST

## 2023-06-15 ENCOUNTER — OFFICE VISIT (OUTPATIENT)
Dept: PODIATRY | Facility: CLINIC | Age: 53
End: 2023-06-15
Payer: COMMERCIAL

## 2023-06-15 VITALS — BODY MASS INDEX: 33.8 KG/M2 | HEIGHT: 68 IN | WEIGHT: 223 LBS

## 2023-06-15 DIAGNOSIS — Z09 FOLLOW-UP EXAMINATION FOLLOWING SURGERY: Primary | ICD-10-CM

## 2023-06-15 PROCEDURE — 4010F PR ACE/ARB THEARPY RXD/TAKEN: ICD-10-PCS | Mod: CPTII,S$GLB,, | Performed by: PODIATRIST

## 2023-06-15 PROCEDURE — 1159F MED LIST DOCD IN RCRD: CPT | Mod: CPTII,S$GLB,, | Performed by: PODIATRIST

## 2023-06-15 PROCEDURE — 3008F BODY MASS INDEX DOCD: CPT | Mod: CPTII,S$GLB,, | Performed by: PODIATRIST

## 2023-06-15 PROCEDURE — 99024 PR POST-OP FOLLOW-UP VISIT: ICD-10-PCS | Mod: S$GLB,,, | Performed by: PODIATRIST

## 2023-06-15 PROCEDURE — 99999 PR PBB SHADOW E&M-EST. PATIENT-LVL IV: ICD-10-PCS | Mod: PBBFAC,,, | Performed by: PODIATRIST

## 2023-06-15 PROCEDURE — 99024 POSTOP FOLLOW-UP VISIT: CPT | Mod: S$GLB,,, | Performed by: PODIATRIST

## 2023-06-15 PROCEDURE — 4010F ACE/ARB THERAPY RXD/TAKEN: CPT | Mod: CPTII,S$GLB,, | Performed by: PODIATRIST

## 2023-06-15 PROCEDURE — 99999 PR PBB SHADOW E&M-EST. PATIENT-LVL IV: CPT | Mod: PBBFAC,,, | Performed by: PODIATRIST

## 2023-06-15 PROCEDURE — 3008F PR BODY MASS INDEX (BMI) DOCUMENTED: ICD-10-PCS | Mod: CPTII,S$GLB,, | Performed by: PODIATRIST

## 2023-06-15 PROCEDURE — 1159F PR MEDICATION LIST DOCUMENTED IN MEDICAL RECORD: ICD-10-PCS | Mod: CPTII,S$GLB,, | Performed by: PODIATRIST

## 2023-06-15 NOTE — PROGRESS NOTES
"      PODIATRIC MEDICINE AND SURGERY      Chief Complaint   Patient presents with    Post-op Evaluation     S/p, OSTECTOMY (Left), DOS: 05/31/2023, rates pain 0/10         SUBJECTIVE   Deborah Navas is a 52 y.o. female status post left 5th digit ostectomy , DOS 5/31/23. Pt is appx 2 week(s) post operatively. Pain is well controlled with pain medications. Pt has been WBAT.  States  has kept dressing clean/dry; Pt denies fever, chills, nausea, and/or vomiting. Pt has no new complaint(s).     OBJECTIVE   Vitals:    06/15/23 1031   Weight: 101.2 kg (223 lb)   Height: 5' 8" (1.727 m)   PainSc: 0-No pain       Neurovascular status - vascular status grossly intact.   Surgical incision well coapted with sutures  Pins - N/A   Erythema - none   Edema - mild   Warmth - no increase in skin temp from prox to distally b/l   TTP - mild at the incision site consistent with po course  ROM - good, pain free   Dehiscence- none  Drainage- none    Radiographs reviewed:   -  post op.    ASSESSMENT  1. Surgery follow-up examination    PLAN  - Weight bearing status - WBAT   - Ambulatory aids -none  - Dressing - Dressing change was performed. Sutures removed/ steri strips. Pt instructed to keep clean, dry, intact. Do not get wet until 48 hrs.  - Follow up - RTC as scheduled     Future Appointments   Date Time Provider Department Center   6/19/2023  3:15 PM ECHOCARDIOGRAM, [facility] [place]   6/20/2023  8:30 AM LABORATORY, [facility] [place]   6/20/2023 11:00 AM Miriam Prieto MD [facility] [place]   6/22/2023  8:00 AM Debbie Sumner DPM [facility] [place]       Report Electronically Signed By:     [place] Han DPM   Podiatry  [facility]  6/15/2023                        "

## 2023-06-19 ENCOUNTER — HOSPITAL ENCOUNTER (OUTPATIENT)
Dept: CARDIOLOGY | Facility: HOSPITAL | Age: 53
Discharge: HOME | End: 2023-06-19
Attending: INTERNAL MEDICINE
Payer: COMMERCIAL

## 2023-06-19 VITALS — BODY MASS INDEX: 33.8 KG/M2 | HEIGHT: 68 IN | WEIGHT: 223 LBS

## 2023-06-19 DIAGNOSIS — Z94.1 STATUS POST HEART TRANSPLANT: ICD-10-CM

## 2023-06-19 DIAGNOSIS — T86.290 VASCULOPATHY OF CARDIAC ALLOGRAFT: ICD-10-CM

## 2023-06-19 PROCEDURE — 93306 TTE W/DOPPLER COMPLETE: CPT

## 2023-06-19 PROCEDURE — 93306 ECHO (CUPID ONLY): ICD-10-PCS | Mod: 26,,, | Performed by: STUDENT IN AN ORGANIZED HEALTH CARE EDUCATION/TRAINING PROGRAM

## 2023-06-19 PROCEDURE — 93306 TTE W/DOPPLER COMPLETE: CPT | Mod: 26,,, | Performed by: STUDENT IN AN ORGANIZED HEALTH CARE EDUCATION/TRAINING PROGRAM
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
## 2023-06-20 ENCOUNTER — OFFICE VISIT (OUTPATIENT)
Dept: TRANSPLANT | Facility: CLINIC | Age: 53
End: 2023-06-20
Payer: COMMERCIAL

## 2023-06-20 VITALS
WEIGHT: 219.56 LBS | BODY MASS INDEX: 33.39 KG/M2 | OXYGEN SATURATION: 99 % | DIASTOLIC BLOOD PRESSURE: 90 MMHG | SYSTOLIC BLOOD PRESSURE: 120 MMHG | HEART RATE: 100 BPM

## 2023-06-20 DIAGNOSIS — I15.8 HYPERTENSION ASSOCIATED WITH TRANSPLANTATION: ICD-10-CM

## 2023-06-20 DIAGNOSIS — Z94.1 S/P ORTHOTOPIC HEART TRANSPLANT: ICD-10-CM

## 2023-06-20 DIAGNOSIS — D86.85 CARDIAC SARCOIDOSIS: ICD-10-CM

## 2023-06-20 DIAGNOSIS — D84.821 IMMUNOCOMPROMISED STATE DUE TO DRUG THERAPY: ICD-10-CM

## 2023-06-20 DIAGNOSIS — Z94.1 HEART TRANSPLANTED: Primary | ICD-10-CM

## 2023-06-20 DIAGNOSIS — Z79.899 IMMUNOCOMPROMISED STATE DUE TO DRUG THERAPY: ICD-10-CM

## 2023-06-20 DIAGNOSIS — Z94.9 HYPERTENSION ASSOCIATED WITH TRANSPLANTATION: ICD-10-CM

## 2023-06-20 LAB
AORTIC ROOT ANNULUS: 3.06 CM
AV INDEX (PROSTH): 0.62
AV MEAN GRADIENT: 5 MMHG
AV PEAK GRADIENT: 8 MMHG
AV VALVE AREA: 1.84 CM2
AV VELOCITY RATIO: 0.59
BSA FOR ECHO PROCEDURE: 2.2 M2
CV ECHO LV RWT: 0.41 CM
DOP CALC AO PEAK VEL: 1.41 M/S
DOP CALC AO VTI: 25.5 CM
DOP CALC LVOT AREA: 3 CM2
DOP CALC LVOT DIAMETER: 1.95 CM
DOP CALC LVOT PEAK VEL: 0.83 M/S
DOP CALC LVOT STROKE VOLUME: 46.86 CM3
DOP CALC RVOT PEAK VEL: 0.68 M/S
DOP CALC RVOT VTI: 13.3 CM
DOP CALCLVOT PEAK VEL VTI: 15.7 CM
ECHO LV POSTERIOR WALL: 0.93 CM (ref 0.6–1.1)
EJECTION FRACTION: 60 %
FRACTIONAL SHORTENING: 30 % (ref 28–44)
INTERVENTRICULAR SEPTUM: 0.94 CM (ref 0.6–1.1)
LEFT INTERNAL DIMENSION IN SYSTOLE: 3.16 CM (ref 2.1–4)
LEFT VENTRICLE DIASTOLIC VOLUME INDEX: 42.95 ML/M2
LEFT VENTRICLE DIASTOLIC VOLUME: 91.92 ML
LEFT VENTRICLE MASS INDEX: 65 G/M2
LEFT VENTRICLE SYSTOLIC VOLUME INDEX: 18.5 ML/M2
LEFT VENTRICLE SYSTOLIC VOLUME: 39.69 ML
LEFT VENTRICULAR INTERNAL DIMENSION IN DIASTOLE: 4.49 CM (ref 3.5–6)
LEFT VENTRICULAR MASS: 139.33 G
LVOT MG: 1.66 MMHG
LVOT MV: 0.61 CM/S
PISA TR MAX VEL: 2.74 M/S
PV MEAN GRADIENT: 1.11 MMHG
PV MV: 0.78 M/S
PV PEAK VELOCITY: 1.08 CM/S
RA PRESSURE: 3 MMHG
SINUS: 2.34 CM
STJ: 2.22 CM
TDI LATERAL: 0.15 M/S
TDI SEPTAL: 0.11 M/S
TDI: 0.13 M/S
TR MAX PG: 30 MMHG
TV REST PULMONARY ARTERY PRESSURE: 33 MMHG

## 2023-06-20 PROCEDURE — 3074F SYST BP LT 130 MM HG: CPT | Mod: CPTII,S$GLB,, | Performed by: INTERNAL MEDICINE

## 2023-06-20 PROCEDURE — 99999 PR PBB SHADOW E&M-EST. PATIENT-LVL V: CPT | Mod: PBBFAC,,, | Performed by: INTERNAL MEDICINE

## 2023-06-20 PROCEDURE — 99999 PR PBB SHADOW E&M-EST. PATIENT-LVL V: ICD-10-PCS | Mod: PBBFAC,,, | Performed by: INTERNAL MEDICINE

## 2023-06-20 PROCEDURE — 3074F PR MOST RECENT SYSTOLIC BLOOD PRESSURE < 130 MM HG: ICD-10-PCS | Mod: CPTII,S$GLB,, | Performed by: INTERNAL MEDICINE

## 2023-06-20 PROCEDURE — 4010F ACE/ARB THERAPY RXD/TAKEN: CPT | Mod: CPTII,S$GLB,, | Performed by: INTERNAL MEDICINE

## 2023-06-20 PROCEDURE — 3008F PR BODY MASS INDEX (BMI) DOCUMENTED: ICD-10-PCS | Mod: CPTII,S$GLB,, | Performed by: INTERNAL MEDICINE

## 2023-06-20 PROCEDURE — 1160F PR REVIEW ALL MEDS BY PRESCRIBER/CLIN PHARMACIST DOCUMENTED: ICD-10-PCS | Mod: CPTII,S$GLB,, | Performed by: INTERNAL MEDICINE

## 2023-06-20 PROCEDURE — 1159F PR MEDICATION LIST DOCUMENTED IN MEDICAL RECORD: ICD-10-PCS | Mod: CPTII,S$GLB,, | Performed by: INTERNAL MEDICINE

## 2023-06-20 PROCEDURE — 3008F BODY MASS INDEX DOCD: CPT | Mod: CPTII,S$GLB,, | Performed by: INTERNAL MEDICINE

## 2023-06-20 PROCEDURE — 1160F RVW MEDS BY RX/DR IN RCRD: CPT | Mod: CPTII,S$GLB,, | Performed by: INTERNAL MEDICINE

## 2023-06-20 PROCEDURE — 4010F PR ACE/ARB THEARPY RXD/TAKEN: ICD-10-PCS | Mod: CPTII,S$GLB,, | Performed by: INTERNAL MEDICINE

## 2023-06-20 PROCEDURE — 99214 PR OFFICE/OUTPT VISIT, EST, LEVL IV, 30-39 MIN: ICD-10-PCS | Mod: S$GLB,,, | Performed by: INTERNAL MEDICINE

## 2023-06-20 PROCEDURE — 3080F DIAST BP >= 90 MM HG: CPT | Mod: CPTII,S$GLB,, | Performed by: INTERNAL MEDICINE

## 2023-06-20 PROCEDURE — 3080F PR MOST RECENT DIASTOLIC BLOOD PRESSURE >= 90 MM HG: ICD-10-PCS | Mod: CPTII,S$GLB,, | Performed by: INTERNAL MEDICINE

## 2023-06-20 PROCEDURE — 99214 OFFICE O/P EST MOD 30 MIN: CPT | Mod: S$GLB,,, | Performed by: INTERNAL MEDICINE

## 2023-06-20 PROCEDURE — 1159F MED LIST DOCD IN RCRD: CPT | Mod: CPTII,S$GLB,, | Performed by: INTERNAL MEDICINE

## 2023-06-20 RX ORDER — CANDESARTAN 8 MG/1
8 TABLET ORAL NIGHTLY
Qty: 90 TABLET | Refills: 3 | Status: SHIPPED | OUTPATIENT
Start: 2023-06-20 | End: 2023-09-11

## 2023-06-20 NOTE — PROGRESS NOTES
Subjective:   Ms. Navas is a 52 y.o. year old White female who received a donation after brain death heart transplant on 12/16/19.      CMV status:   Donor: +  Recipient:-    HPI  53 yo WF s/p OHT 12/16/2019 is here for  post OHT visit.  She had extensive sarcoidosis on her explanted heart.  This was a surprise finding.  Patient last seen by Dr. Antunez, after which time she had recently had a fracture of her ankle. Doing well overall, had surgery 3 weeks ago, had to be switched from rapamune ot tacro for the surgery. Healing well, next week sees doctor to see if she can take ace bandage off. + CAV. Patient denies N/V/F/C, lightheadedness, dizziness, PND, orthopnea, LE edema, abdominal pain, abdominal pressure, chest pain, chest pressure, syncope, pre-syncope.   Moving around well. Is not up to date on health maintenance, however due to having her foot broken for so long, is going to start following up with all of that before next annual.       Immunosuppression regimen includes; tacro 3mg BID, Cellcept 1000 mg BID, Prednisone 5 mg qd (left on due to sarcoidosis)      Review of Systems   Constitutional: Negative for chills, fever, malaise/fatigue, night sweats, weight gain and weight loss.   Cardiovascular:  Negative for chest pain, dyspnea on exertion, irregular heartbeat, leg swelling, near-syncope, orthopnea, palpitations, paroxysmal nocturnal dyspnea and syncope.   Respiratory:  Negative for cough, sputum production and wheezing.    Hematologic/Lymphatic: Does not bruise/bleed easily.   Musculoskeletal:  Negative for arthritis, joint pain and stiffness.   Gastrointestinal:  Negative for hematochezia and melena.   Genitourinary:  Negative for hematuria.   Neurological:  Negative for brief paralysis, focal weakness, seizures and weakness.     Objective:   Blood pressure (!) 120/90, pulse 100, weight 99.6 kg (219 lb 9.3 oz), SpO2 99 %.body mass index is 33.39 kg/m².    Physical Exam  Constitutional:        General: She is not in acute distress.     Appearance: She is well-developed. She is not ill-appearing, toxic-appearing or diaphoretic.   HENT:      Head: Normocephalic and atraumatic.   Eyes:      General: No scleral icterus.        Right eye: No discharge.         Left eye: No discharge.      Conjunctiva/sclera: Conjunctivae normal.   Neck:      Thyroid: No thyromegaly.      Vascular: No JVD.      Trachea: No tracheal deviation.   Cardiovascular:      Rate and Rhythm: Normal rate and regular rhythm.      Heart sounds: Normal heart sounds. No murmur heard.    No gallop.   Pulmonary:      Effort: Pulmonary effort is normal.      Breath sounds: Normal breath sounds.   Abdominal:      General: Bowel sounds are normal. There is no distension.      Palpations: Abdomen is soft. There is no mass.      Tenderness: There is no abdominal tenderness. There is no guarding or rebound.   Musculoskeletal:         General: No swelling or tenderness.      Right lower leg: No edema.      Left lower leg: No edema.   Skin:     General: Skin is warm and dry.   Neurological:      General: No focal deficit present.      Mental Status: She is alert and oriented to person, place, and time. Mental status is at baseline.   Psychiatric:         Mood and Affect: Mood normal.         Behavior: Behavior normal.         Thought Content: Thought content normal.         Judgment: Judgment normal.     Lab Results   Component Value Date    BNP 69 12/22/2022     05/30/2023     11/01/2019    K 4.4 05/30/2023    K 4.5 11/01/2019    MG 1.9 12/22/2022     05/30/2023    CL 24 10/10/2019    CO2 23 05/30/2023    BUN 25 (H) 05/30/2023    BUN 20 11/01/2019    CREATININE 1.2 05/30/2023    CREATININE 1.4 11/01/2019     (H) 05/30/2023    HGBA1C 5.9 (H) 02/17/2022    AST 15 05/30/2023    ALT 11 05/30/2023    ALBUMIN 3.9 05/30/2023    PROT 6.6 05/30/2023    BILITOT 0.4 05/30/2023    CHOL 184 12/22/2022    HDL 48 12/22/2022    LDLCALC 82.6  12/22/2022    TRIG 267 (H) 12/22/2022       Magnesium   Date Value Ref Range Status   12/22/2022 1.9 1.6 - 2.6 mg/dL Final       Lab Results   Component Value Date    WBC 5.61 05/30/2023    HGB 13.9 05/30/2023    HCT 43.5 05/30/2023    MCV 84 05/30/2023     05/30/2023       Lab Results   Component Value Date    INR 1.2 12/23/2019    INR 1.4 (H) 12/22/2019    INR 1.3 (H) 12/21/2019       BNP   Date Value Ref Range Status   12/22/2022 69 0 - 99 pg/mL Final     Comment:     Values of less than 100 pg/ml are consistent with non-CHF populations.   07/29/2022 40 0 - 99 pg/mL Final     Comment:     Values of less than 100 pg/ml are consistent with non-CHF populations.   06/15/2022 61 0 - 99 pg/mL Final     Comment:     Values of less than 100 pg/ml are consistent with non-CHF populations.       December 22, 2022 echocardiogram  The patient is status post cardiac transplantation.  The left ventricle is normal in size with normal systolic function.  The estimated ejection fraction is 65%.  Normal left ventricular diastolic function.  Normal right ventricular size with normal right ventricular systolic function.  The estimated PA systolic pressure is 26 mmHg.  Normal central venous pressure (3 mmHg).     March 18, 2022 coronary angiogram  Normal coronary arteries by angiography  Eccentric plaque as detailed below noted on IVUs or LM, proximal LAD, and mid LAD    Assessment:     1. Heart transplanted    2. Hypertension associated with transplantation    3. 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 S/P orthotopic heart transplant    5. Immunocompromised state due to drug therapy        Plan:   Patient feels well overall, looking forward getting her boot off.   Will get rheumatology consult for her here at , as she needs re-evaluation for her sarcoidosis.   Likely transition back to rapamune at about 6-8 weeks from surgery.   Reschedule labs from today to tomorrow.     Return  instructions as set forth by post transplant schedule or as needed:    Clinic: Return for labs and/or biopsy weekly the first month, every two weeks during month 2 and then monthly for the first year at the provider or coordinator's discretion. During the second year, return to clinic every 3 months. Post transplant year 3-5 return every 6 months. There will be a comprehensive post transplant evaluation every year that may include LHC/RHC/biopsy, stress test, echo, CXR, and other health screening exams.    In addition to the clinical assessment, I have ordered Allomap testing for this patient to assist in identification of moderate/severe acute cellular rejection (ACR) in a pt with stable Allograft function instead of endomyocardial biopsy.     Patient is reminded to call with any health changes as these can be early signs of transplant complications. Patient is advised to make sure any new medications or changes of old medications are discussed with a pharmacist or physician knowledgeable with transplant to avoid rejection/drug toxicity related to significant drug interactions.    Patient advised that it is recommended that all transplanted patients, and their close contacts and household members receive Covid vaccination.    UNOS Patient Status  Functional Status: 100% - Normal, no complaints, no evidence of disease  Physical Capacity: No Limitations    Miriam Prieto MD

## 2023-06-20 NOTE — LETTER
June 20, 2023        Joaquin Del Toro  [address]  [address]  [place] [address]  Phone: [phone]  Fax: [phone]             [facility] ([address])  [address] [place] [address]  Phone: [phone]  Fax: [phone]   Patient: Deborah Navas   MR Number: [id number]   YOB: 1970   Date of Visit: 6/20/2023       Dear Dr. Joaquin Del Toro    Thank you for referring Deborah Navas to me for evaluation. Attached you will find relevant portions of my assessment and plan of care.    If you have questions, please do not hesitate to call me. I look forward to following Deborah Navas along with you.    Sincerely,    Miriam Prieto MD    Enclosure    If you would like to receive this communication electronically, please contact [email or web address] or [phone] to request [employer] access.    [employer] is a tool which provides read-only access to select patient information with whom you have a relationship. Its easy to use and provides real time access to review your patients record including encounter summaries, notes, results, and demographic information.    If you feel you have received this communication in error or would no longer like to receive these types of communications, please e-mail [email or web address]

## 2023-06-21 ENCOUNTER — LAB VISIT (OUTPATIENT)
Dept: LAB | Facility: HOSPITAL | Age: 53
End: 2023-06-21
Attending: INTERNAL MEDICINE
Payer: COMMERCIAL

## 2023-06-21 DIAGNOSIS — Z94.1 STATUS POST HEART TRANSPLANT: ICD-10-CM

## 2023-06-21 DIAGNOSIS — T86.290 VASCULOPATHY OF CARDIAC ALLOGRAFT: ICD-10-CM

## 2023-06-21 LAB
ALBUMIN SERPL BCP-MCNC: 4.2 G/DL (ref 3.5–5.2)
ALP SERPL-CCNC: 72 U/L (ref 55–135)
ALT SERPL W/O P-5'-P-CCNC: 13 U/L (ref 10–44)
ANION GAP SERPL CALC-SCNC: 13 MMOL/L (ref 8–16)
AST SERPL-CCNC: 15 U/L (ref 10–40)
BASOPHILS # BLD AUTO: 0.06 K/UL (ref 0–0.2)
BASOPHILS NFR BLD: 1.2 % (ref 0–1.9)
BILIRUB SERPL-MCNC: 0.5 MG/DL (ref 0.1–1)
BNP SERPL-MCNC: 50 PG/ML (ref 0–99)
BUN SERPL-MCNC: 24 MG/DL (ref 6–20)
CALCIUM SERPL-MCNC: 10 MG/DL (ref 8.7–10.5)
CHLORIDE SERPL-SCNC: 104 MMOL/L (ref 95–110)
CHOLEST SERPL-MCNC: 116 MG/DL (ref 120–199)
CHOLEST/HDLC SERPL: 2.3 {RATIO} (ref 2–5)
CO2 SERPL-SCNC: 24 MMOL/L (ref 23–29)
CREAT SERPL-MCNC: 1.2 MG/DL (ref 0.5–1.4)
DIFFERENTIAL METHOD: ABNORMAL
EOSINOPHIL # BLD AUTO: 0.2 K/UL (ref 0–0.5)
EOSINOPHIL NFR BLD: 4 % (ref 0–8)
ERYTHROCYTE [DISTWIDTH] IN BLOOD BY AUTOMATED COUNT: 16.6 % (ref 11.5–14.5)
EST. GFR  (NO RACE VARIABLE): 54.1 ML/MIN/1.73 M^2
GLUCOSE SERPL-MCNC: 87 MG/DL (ref 70–110)
HCT VFR BLD AUTO: 47.8 % (ref 37–48.5)
HDLC SERPL-MCNC: 50 MG/DL (ref 40–75)
HDLC SERPL: 43.1 % (ref 20–50)
HGB BLD-MCNC: 14.9 G/DL (ref 12–16)
IMM GRANULOCYTES # BLD AUTO: 0.01 K/UL (ref 0–0.04)
IMM GRANULOCYTES NFR BLD AUTO: 0.2 % (ref 0–0.5)
LDLC SERPL CALC-MCNC: 48.4 MG/DL (ref 63–159)
LYMPHOCYTES # BLD AUTO: 1.2 K/UL (ref 1–4.8)
LYMPHOCYTES NFR BLD: 23.3 % (ref 18–48)
MCH RBC QN AUTO: 27 PG (ref 27–31)
MCHC RBC AUTO-ENTMCNC: 31.2 G/DL (ref 32–36)
MCV RBC AUTO: 87 FL (ref 82–98)
MONOCYTES # BLD AUTO: 0.4 K/UL (ref 0.3–1)
MONOCYTES NFR BLD: 8.5 % (ref 4–15)
NEUTROPHILS # BLD AUTO: 3.3 K/UL (ref 1.8–7.7)
NEUTROPHILS NFR BLD: 62.8 % (ref 38–73)
NONHDLC SERPL-MCNC: 66 MG/DL
NRBC BLD-RTO: 0 /100 WBC
PLATELET # BLD AUTO: 291 K/UL (ref 150–450)
PMV BLD AUTO: 9.6 FL (ref 9.2–12.9)
POTASSIUM SERPL-SCNC: 4 MMOL/L (ref 3.5–5.1)
PROT SERPL-MCNC: 6.7 G/DL (ref 6–8.4)
RBC # BLD AUTO: 5.51 M/UL (ref 4–5.4)
SODIUM SERPL-SCNC: 141 MMOL/L (ref 136–145)
TRIGL SERPL-MCNC: 88 MG/DL (ref 30–150)
WBC # BLD AUTO: 5.19 K/UL (ref 3.9–12.7)

## 2023-06-21 PROCEDURE — 86977 RBC SERUM PRETX INCUBJ/INHIB: CPT | Mod: 59 | Performed by: INTERNAL MEDICINE

## 2023-06-21 PROCEDURE — 85025 COMPLETE CBC W/AUTO DIFF WBC: CPT | Performed by: INTERNAL MEDICINE

## 2023-06-21 PROCEDURE — 86832 HLA CLASS I HIGH DEFIN QUAL: CPT | Performed by: INTERNAL MEDICINE

## 2023-06-21 PROCEDURE — 80197 ASSAY OF TACROLIMUS: CPT | Performed by: INTERNAL MEDICINE

## 2023-06-21 PROCEDURE — 86832 HLA CLASS I HIGH DEFIN QUAL: CPT | Mod: 59 | Performed by: INTERNAL MEDICINE

## 2023-06-21 PROCEDURE — 80061 LIPID PANEL: CPT | Performed by: INTERNAL MEDICINE

## 2023-06-21 PROCEDURE — 80053 COMPREHEN METABOLIC PANEL: CPT | Performed by: INTERNAL MEDICINE

## 2023-06-21 PROCEDURE — 36415 COLL VENOUS BLD VENIPUNCTURE: CPT | Performed by: INTERNAL MEDICINE

## 2023-06-21 PROCEDURE — 86833 HLA CLASS II HIGH DEFIN QUAL: CPT | Performed by: INTERNAL MEDICINE

## 2023-06-21 PROCEDURE — 86833 HLA CLASS II HIGH DEFIN QUAL: CPT | Mod: 59 | Performed by: INTERNAL MEDICINE

## 2023-06-21 PROCEDURE — 83880 ASSAY OF NATRIURETIC PEPTIDE: CPT | Performed by: INTERNAL MEDICINE

## 2023-06-22 ENCOUNTER — OFFICE VISIT (OUTPATIENT)
Dept: PODIATRY | Facility: CLINIC | Age: 53
End: 2023-06-22
Payer: COMMERCIAL

## 2023-06-22 ENCOUNTER — PATIENT MESSAGE (OUTPATIENT)
Dept: TRANSPLANT | Facility: CLINIC | Age: 53
End: 2023-06-22
Payer: COMMERCIAL

## 2023-06-22 VITALS — WEIGHT: 219.56 LBS | HEIGHT: 68 IN | BODY MASS INDEX: 33.28 KG/M2

## 2023-06-22 DIAGNOSIS — Z09 FOLLOW-UP EXAMINATION FOLLOWING SURGERY: Primary | ICD-10-CM

## 2023-06-22 LAB — TACROLIMUS BLD-MCNC: 9.4 NG/ML (ref 5–15)

## 2023-06-22 PROCEDURE — 1159F MED LIST DOCD IN RCRD: CPT | Mod: CPTII,S$GLB,, | Performed by: PODIATRIST

## 2023-06-22 PROCEDURE — 99999 PR PBB SHADOW E&M-EST. PATIENT-LVL IV: CPT | Mod: PBBFAC,,, | Performed by: PODIATRIST

## 2023-06-22 PROCEDURE — 99024 PR POST-OP FOLLOW-UP VISIT: ICD-10-PCS | Mod: S$GLB,,, | Performed by: PODIATRIST

## 2023-06-22 PROCEDURE — 3008F PR BODY MASS INDEX (BMI) DOCUMENTED: ICD-10-PCS | Mod: CPTII,S$GLB,, | Performed by: PODIATRIST

## 2023-06-22 PROCEDURE — 1160F RVW MEDS BY RX/DR IN RCRD: CPT | Mod: CPTII,S$GLB,, | Performed by: PODIATRIST

## 2023-06-22 PROCEDURE — 3008F BODY MASS INDEX DOCD: CPT | Mod: CPTII,S$GLB,, | Performed by: PODIATRIST

## 2023-06-22 PROCEDURE — 1160F PR REVIEW ALL MEDS BY PRESCRIBER/CLIN PHARMACIST DOCUMENTED: ICD-10-PCS | Mod: CPTII,S$GLB,, | Performed by: PODIATRIST

## 2023-06-22 PROCEDURE — 4010F ACE/ARB THERAPY RXD/TAKEN: CPT | Mod: CPTII,S$GLB,, | Performed by: PODIATRIST

## 2023-06-22 PROCEDURE — 99999 PR PBB SHADOW E&M-EST. PATIENT-LVL IV: ICD-10-PCS | Mod: PBBFAC,,, | Performed by: PODIATRIST

## 2023-06-22 PROCEDURE — 99024 POSTOP FOLLOW-UP VISIT: CPT | Mod: S$GLB,,, | Performed by: PODIATRIST

## 2023-06-22 PROCEDURE — 1159F PR MEDICATION LIST DOCUMENTED IN MEDICAL RECORD: ICD-10-PCS | Mod: CPTII,S$GLB,, | Performed by: PODIATRIST

## 2023-06-22 PROCEDURE — 4010F PR ACE/ARB THEARPY RXD/TAKEN: ICD-10-PCS | Mod: CPTII,S$GLB,, | Performed by: PODIATRIST
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
## 2023-08-03 ENCOUNTER — PATIENT MESSAGE (OUTPATIENT)
Dept: TRANSPLANT | Facility: CLINIC | Age: 53
End: 2023-08-03
Payer: COMMERCIAL

## 2023-08-04 ENCOUNTER — OFFICE VISIT (OUTPATIENT)
Dept: RHEUMATOLOGY | Facility: CLINIC | Age: 53
End: 2023-08-04
Payer: COMMERCIAL

## 2023-08-04 ENCOUNTER — LAB VISIT (OUTPATIENT)
Dept: LAB | Facility: HOSPITAL | Age: 53
End: 2023-08-04
Attending: FAMILY MEDICINE
Payer: COMMERCIAL

## 2023-08-04 ENCOUNTER — PATIENT MESSAGE (OUTPATIENT)
Dept: RHEUMATOLOGY | Facility: CLINIC | Age: 53
End: 2023-08-04
Payer: COMMERCIAL

## 2023-08-04 VITALS
HEART RATE: 113 BPM | SYSTOLIC BLOOD PRESSURE: 124 MMHG | WEIGHT: 219.13 LBS | HEIGHT: 68 IN | DIASTOLIC BLOOD PRESSURE: 86 MMHG | BODY MASS INDEX: 33.21 KG/M2

## 2023-08-04 DIAGNOSIS — D86.85 CARDIAC SARCOIDOSIS: ICD-10-CM

## 2023-08-04 LAB
25(OH)D3+25(OH)D2 SERPL-MCNC: 45 NG/ML (ref 30–96)
ALBUMIN SERPL BCP-MCNC: 4 G/DL (ref 3.5–5.2)
ALP SERPL-CCNC: 70 U/L (ref 55–135)
ALT SERPL W/O P-5'-P-CCNC: 12 U/L (ref 10–44)
ANION GAP SERPL CALC-SCNC: 14 MMOL/L (ref 8–16)
AST SERPL-CCNC: 14 U/L (ref 10–40)
BASOPHILS # BLD AUTO: 0.05 K/UL (ref 0–0.2)
BASOPHILS NFR BLD: 0.8 % (ref 0–1.9)
BILIRUB SERPL-MCNC: 0.4 MG/DL (ref 0.1–1)
BUN SERPL-MCNC: 29 MG/DL (ref 6–20)
CALCIUM SERPL-MCNC: 9.6 MG/DL (ref 8.7–10.5)
CHLORIDE SERPL-SCNC: 107 MMOL/L (ref 95–110)
CO2 SERPL-SCNC: 22 MMOL/L (ref 23–29)
CREAT SERPL-MCNC: 1.1 MG/DL (ref 0.5–1.4)
CRP SERPL-MCNC: 3.7 MG/L (ref 0–8.2)
DIFFERENTIAL METHOD: ABNORMAL
EOSINOPHIL # BLD AUTO: 0.2 K/UL (ref 0–0.5)
EOSINOPHIL NFR BLD: 2.7 % (ref 0–8)
ERYTHROCYTE [DISTWIDTH] IN BLOOD BY AUTOMATED COUNT: 14.8 % (ref 11.5–14.5)
ERYTHROCYTE [SEDIMENTATION RATE] IN BLOOD BY WESTERGREN METHOD: 2 MM/HR (ref 0–20)
EST. GFR  (NO RACE VARIABLE): >60 ML/MIN/1.73 M^2
GLUCOSE SERPL-MCNC: 111 MG/DL (ref 70–110)
HCT VFR BLD AUTO: 46.3 % (ref 37–48.5)
HGB BLD-MCNC: 14.7 G/DL (ref 12–16)
IMM GRANULOCYTES # BLD AUTO: 0.03 K/UL (ref 0–0.04)
IMM GRANULOCYTES NFR BLD AUTO: 0.5 % (ref 0–0.5)
LYMPHOCYTES # BLD AUTO: 1.2 K/UL (ref 1–4.8)
LYMPHOCYTES NFR BLD: 18 % (ref 18–48)
MCH RBC QN AUTO: 27.4 PG (ref 27–31)
MCHC RBC AUTO-ENTMCNC: 31.7 G/DL (ref 32–36)
MCV RBC AUTO: 86 FL (ref 82–98)
MONOCYTES # BLD AUTO: 0.6 K/UL (ref 0.3–1)
MONOCYTES NFR BLD: 8.9 % (ref 4–15)
NEUTROPHILS # BLD AUTO: 4.4 K/UL (ref 1.8–7.7)
NEUTROPHILS NFR BLD: 69.1 % (ref 38–73)
NRBC BLD-RTO: 0 /100 WBC
PLATELET # BLD AUTO: 324 K/UL (ref 150–450)
PMV BLD AUTO: 9.5 FL (ref 9.2–12.9)
POTASSIUM SERPL-SCNC: 4 MMOL/L (ref 3.5–5.1)
PROT SERPL-MCNC: 6.7 G/DL (ref 6–8.4)
RBC # BLD AUTO: 5.36 M/UL (ref 4–5.4)
SODIUM SERPL-SCNC: 143 MMOL/L (ref 136–145)
WBC # BLD AUTO: 6.38 K/UL (ref 3.9–12.7)

## 2023-08-04 PROCEDURE — 1159F PR MEDICATION LIST DOCUMENTED IN MEDICAL RECORD: ICD-10-PCS | Mod: CPTII,S$GLB,, | Performed by: STUDENT IN AN ORGANIZED HEALTH CARE EDUCATION/TRAINING PROGRAM

## 2023-08-04 PROCEDURE — 4010F PR ACE/ARB THEARPY RXD/TAKEN: ICD-10-PCS | Mod: CPTII,S$GLB,, | Performed by: STUDENT IN AN ORGANIZED HEALTH CARE EDUCATION/TRAINING PROGRAM

## 2023-08-04 PROCEDURE — 85651 RBC SED RATE NONAUTOMATED: CPT | Performed by: STUDENT IN AN ORGANIZED HEALTH CARE EDUCATION/TRAINING PROGRAM

## 2023-08-04 PROCEDURE — 1159F MED LIST DOCD IN RCRD: CPT | Mod: CPTII,S$GLB,, | Performed by: STUDENT IN AN ORGANIZED HEALTH CARE EDUCATION/TRAINING PROGRAM

## 2023-08-04 PROCEDURE — 3074F PR MOST RECENT SYSTOLIC BLOOD PRESSURE < 130 MM HG: ICD-10-PCS | Mod: CPTII,S$GLB,, | Performed by: STUDENT IN AN ORGANIZED HEALTH CARE EDUCATION/TRAINING PROGRAM

## 2023-08-04 PROCEDURE — 82306 VITAMIN D 25 HYDROXY: CPT | Performed by: STUDENT IN AN ORGANIZED HEALTH CARE EDUCATION/TRAINING PROGRAM

## 2023-08-04 PROCEDURE — 3074F SYST BP LT 130 MM HG: CPT | Mod: CPTII,S$GLB,, | Performed by: STUDENT IN AN ORGANIZED HEALTH CARE EDUCATION/TRAINING PROGRAM

## 2023-08-04 PROCEDURE — 99999 PR PBB SHADOW E&M-EST. PATIENT-LVL V: CPT | Mod: PBBFAC,,, | Performed by: STUDENT IN AN ORGANIZED HEALTH CARE EDUCATION/TRAINING PROGRAM

## 2023-08-04 PROCEDURE — 80053 COMPREHEN METABOLIC PANEL: CPT | Performed by: STUDENT IN AN ORGANIZED HEALTH CARE EDUCATION/TRAINING PROGRAM

## 2023-08-04 PROCEDURE — 99204 OFFICE O/P NEW MOD 45 MIN: CPT | Mod: S$GLB,,, | Performed by: STUDENT IN AN ORGANIZED HEALTH CARE EDUCATION/TRAINING PROGRAM

## 2023-08-04 PROCEDURE — 4010F ACE/ARB THERAPY RXD/TAKEN: CPT | Mod: CPTII,S$GLB,, | Performed by: STUDENT IN AN ORGANIZED HEALTH CARE EDUCATION/TRAINING PROGRAM

## 2023-08-04 PROCEDURE — 1160F RVW MEDS BY RX/DR IN RCRD: CPT | Mod: CPTII,S$GLB,, | Performed by: STUDENT IN AN ORGANIZED HEALTH CARE EDUCATION/TRAINING PROGRAM

## 2023-08-04 PROCEDURE — 85025 COMPLETE CBC W/AUTO DIFF WBC: CPT | Performed by: STUDENT IN AN ORGANIZED HEALTH CARE EDUCATION/TRAINING PROGRAM

## 2023-08-04 PROCEDURE — 82164 ANGIOTENSIN I ENZYME TEST: CPT | Performed by: STUDENT IN AN ORGANIZED HEALTH CARE EDUCATION/TRAINING PROGRAM

## 2023-08-04 PROCEDURE — 3008F PR BODY MASS INDEX (BMI) DOCUMENTED: ICD-10-PCS | Mod: CPTII,S$GLB,, | Performed by: STUDENT IN AN ORGANIZED HEALTH CARE EDUCATION/TRAINING PROGRAM

## 2023-08-04 PROCEDURE — 99999 PR PBB SHADOW E&M-EST. PATIENT-LVL V: ICD-10-PCS | Mod: PBBFAC,,, | Performed by: STUDENT IN AN ORGANIZED HEALTH CARE EDUCATION/TRAINING PROGRAM

## 2023-08-04 PROCEDURE — 3079F PR MOST RECENT DIASTOLIC BLOOD PRESSURE 80-89 MM HG: ICD-10-PCS | Mod: CPTII,S$GLB,, | Performed by: STUDENT IN AN ORGANIZED HEALTH CARE EDUCATION/TRAINING PROGRAM

## 2023-08-04 PROCEDURE — 86140 C-REACTIVE PROTEIN: CPT | Performed by: STUDENT IN AN ORGANIZED HEALTH CARE EDUCATION/TRAINING PROGRAM

## 2023-08-04 PROCEDURE — 3008F BODY MASS INDEX DOCD: CPT | Mod: CPTII,S$GLB,, | Performed by: STUDENT IN AN ORGANIZED HEALTH CARE EDUCATION/TRAINING PROGRAM

## 2023-08-04 PROCEDURE — 3079F DIAST BP 80-89 MM HG: CPT | Mod: CPTII,S$GLB,, | Performed by: STUDENT IN AN ORGANIZED HEALTH CARE EDUCATION/TRAINING PROGRAM

## 2023-08-04 PROCEDURE — 1160F PR REVIEW ALL MEDS BY PRESCRIBER/CLIN PHARMACIST DOCUMENTED: ICD-10-PCS | Mod: CPTII,S$GLB,, | Performed by: STUDENT IN AN ORGANIZED HEALTH CARE EDUCATION/TRAINING PROGRAM

## 2023-08-04 PROCEDURE — 99204 PR OFFICE/OUTPT VISIT, NEW, LEVL IV, 45-59 MIN: ICD-10-PCS | Mod: S$GLB,,, | Performed by: STUDENT IN AN ORGANIZED HEALTH CARE EDUCATION/TRAINING PROGRAM

## 2023-08-04 PROCEDURE — 83520 IMMUNOASSAY QUANT NOS NONAB: CPT | Performed by: STUDENT IN AN ORGANIZED HEALTH CARE EDUCATION/TRAINING PROGRAM

## 2023-08-04 RX ORDER — VENLAFAXINE HYDROCHLORIDE 150 MG/1
1 CAPSULE, EXTENDED RELEASE ORAL DAILY
COMMUNITY
Start: 2023-02-06

## 2023-08-05 LAB — ACE SERPL-CCNC: 29 U/L (ref 16–85)

## 2023-08-07 LAB — SOL IL2 RECEP SERPL-MCNC: 709.5 PG/ML (ref 175.3–858.2)

## 2023-08-07 NOTE — PROGRESS NOTES
"Subjective:   Patient ID:  Deborah Navas is a 48 y.o. female who presents for LVAD followup visit.    Implant Date:9/10/19  Heartmate 3 RPM 5200  INR goal: 2-3   Bridge with Heparin   Antiplatelets:    Inotropes: NA        DLES:"2" with sutures  Dressing: Daily with soap and water    TXP RON INTERROGATIONS 10/7/2019   Type HeartMate3   Flow 4   Speed 5200   PI 5.5   Power ([place]) 3.7   LSL 4800   Pulsatility Pulse       HPI   NICM: EF 20%, LVEDD: 6.8cm, V-fib reported in setting of hypokalemia with appropriate shock from ICD (on Amiodarone), HLP, being admitted from procedure room for management of cardiogenic shock and workup for advanced options. started on dobutamine infusion and evaluation for advanced options was initiated. She was accepted for VAD and underwent LVAD implantation on 9/10/2019. She had an uncomplicated postoperative course and downgraded to floor within 3 days. She continued to improve .  Minor issues with diaphragmatic stimulation of LV lead.   Left pleural effusion was noted with chest tube was inserted. Chest tube removed on 9/30 with stable small pleural effusion on 10/1. Stable for discharge.    Today she comes in for her first LVAD clinic visit post discharge.  DLES:"2" with sutures.  Reports some increase LEGER at home with some edema.  Need to stop few times on ramp to second lab from parking lot.   She is not on lasix due to diziness in hospital   Has not been dizziness at home.       Review of Systems   Constitution: Positive for weight loss (eight lbs since she went home). Negative for decreased appetite and weight gain.   Cardiovascular: Positive for dyspnea on exertion (with minimal exertion). Negative for chest pain, leg swelling, near-syncope, orthopnea and palpitations.   Respiratory: Positive for shortness of breath. Negative for cough.    Musculoskeletal: Negative for myalgias.   Gastrointestinal: Negative for jaundice.       Objective:   Blood pressure (!) " "84/0, temperature 98.8 °F (37.1 °C), height 5' 7" (1.702 m), weight 105.2 kg (232 lb).body mass index is 36.34 kg/m².    Doppler: 84 (intermittent pulse)    Physical Exam   Constitutional: She appears well-developed and well-nourished. No distress.   BP (!) 84/0 (BP Location: Right arm, Patient Position: Sitting, BP Method: Large (Automatic)) Comment: doppler  Temp 98.8 °F (37.1 °C)   Ht 5' 7" (1.702 m)   Wt 105.2 kg (232 lb)   BMI 36.34 kg/m²      HENT:   Head: Normocephalic and atraumatic. Head is without abrasion and without contusion.   Right Ear: External ear normal.   Left Ear: External ear normal.   Nose: Nose normal. No epistaxis.   Mouth/Throat: Oropharynx is clear and moist. Mucous membranes are not cyanotic.   Eyes: Pupils are equal, round, and reactive to light. Conjunctivae and EOM are normal.   Neck: Normal range of motion. Neck supple. Hepatojugular reflux (very mild ) and JVD (JVP 6 ) present. No tracheal deviation present.   Cardiovascular: Normal rate, regular rhythm, normal heart sounds and normal pulses. Exam reveals no gallop.   No murmur heard.  Pulmonary/Chest: Effort normal. No stridor. No respiratory distress. She has decreased breath sounds in the left lower field. She has no wheezes.   Abdominal: Soft. Normal appearance, normal aorta and bowel sounds are normal. She exhibits no distension. There is no tenderness.   Musculoskeletal: She exhibits no edema or tenderness.   Neurological: She is alert. She has normal strength and normal reflexes. She exhibits normal muscle tone.   Skin: Skin is warm. No rash noted. No erythema.   Psychiatric: She has a normal mood and affect. Her speech is normal and behavior is normal. Judgment and thought content normal. Cognition and memory are normal.       Lab Results   Component Value Date    WBC 7.07 10/01/2019    HGB 8.7 (L) 10/01/2019    HCT 29.2 (L) 10/01/2019    MCV 90 10/01/2019     (H) 10/01/2019    CO2 21 (L) 10/07/2019    CREATININE " 1.4 10/07/2019    CALCIUM 9.5 10/07/2019    ALBUMIN 2.8 (L) 10/07/2019    AST 24 10/07/2019    BNP 1,194 (H) 10/07/2019    ALT 27 10/07/2019     (H) 10/07/2019       Lab Results   Component Value Date    INR 4.6 (H) 10/07/2019    INR 3.8 10/04/2019    INR 2.0 (H) 10/01/2019       BNP   Date Value Ref Range Status   10/07/2019 1,194 (H) 0 - 99 pg/mL Final     Comment:     Values of less than 100 pg/ml are consistent with non-CHF populations.   09/30/2019 730 (H) 0 - 99 pg/mL Final     Comment:     Values of less than 100 pg/ml are consistent with non-CHF populations.   09/27/2019 515 (H) 0 - 99 pg/mL Final     Comment:     Values of less than 100 pg/ml are consistent with non-CHF populations.       LD   Date Value Ref Range Status   10/07/2019 301 (H) 110 - 260 U/L Final     Comment:     Results are increased in hemolyzed samples.   10/01/2019 307 (H) 110 - 260 U/L Final     Comment:     Results are increased in hemolyzed samples.   09/30/2019 303 (H) 110 - 260 U/L Final     Comment:     Results are increased in hemolyzed samples.  *Result may be interfered by visible hemolysis         Assessment:      1. Heart replaced by heart assist device        Plan:   SOB when walking probably combination of deconditioning and mild volume overload - resume lasix / increase aldactone / CXR   BNP / BMP later this week with INR   Patient is now NYHA II  Recommend 2 gram sodium restriction and 1500cc fluid restriction.  Encourage physical activity with graded exercise program.  Requested patient to weigh themselves daily, and to notify us if their weight increases by more than 3 lbs in 1 day or 5 lbs in 1 week.     Listed for transplant: No    UNOS Patient Status  Functional Status: 80% - Normal activity with effort: some symptoms of disease  Physical Capacity: No Limitations  Working for Income: yes  If yes, working activity level: Working Full Time prior to LVAD                      No

## 2023-08-08 NOTE — PROGRESS NOTES
Inflammatory markers not elevated.  IL-2 and Ace levels are normal.  CBC and CMP stable.  Vitamin-D level is normal.

## 2023-08-09 ENCOUNTER — HOSPITAL ENCOUNTER (OUTPATIENT)
Dept: RADIOLOGY | Facility: HOSPITAL | Age: 53
Discharge: HOME | End: 2023-08-09
Attending: INTERNAL MEDICINE
Payer: COMMERCIAL

## 2023-08-09 DIAGNOSIS — D86.85 CARDIAC SARCOIDOSIS: ICD-10-CM

## 2023-08-09 PROCEDURE — 71250 CT THORAX DX C-: CPT | Mod: 26,,, | Performed by: STUDENT IN AN ORGANIZED HEALTH CARE EDUCATION/TRAINING PROGRAM

## 2023-08-09 PROCEDURE — 71250 CT THORAX DX C-: CPT | Mod: TC

## 2023-08-09 PROCEDURE — 71250 CT CHEST WITHOUT CONTRAST: ICD-10-PCS | Mod: 26,,, | Performed by: STUDENT IN AN ORGANIZED HEALTH CARE EDUCATION/TRAINING PROGRAM
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
## 2023-08-10 ENCOUNTER — PATIENT MESSAGE (OUTPATIENT)
Dept: TRANSPLANT | Facility: CLINIC | Age: 53
End: 2023-08-10
Payer: COMMERCIAL

## 2023-08-11 DIAGNOSIS — Z94.1 S/P ORTHOTOPIC HEART TRANSPLANT: ICD-10-CM

## 2023-08-11 DIAGNOSIS — Z94.1 HEART TRANSPLANTED: Primary | ICD-10-CM

## 2023-08-11 DIAGNOSIS — Z79.899 IMMUNOCOMPROMISED STATE DUE TO DRUG THERAPY: ICD-10-CM

## 2023-08-11 DIAGNOSIS — D84.821 IMMUNOCOMPROMISED STATE DUE TO DRUG THERAPY: ICD-10-CM

## 2023-08-11 RX ORDER — TACROLIMUS 1 MG/1
CAPSULE ORAL
Qty: 270 CAPSULE | Refills: 3
Start: 2023-08-11 | End: 2023-09-12 | Stop reason: ALTCHOICE

## 2023-08-11 RX ORDER — SIROLIMUS 1 MG/1
1 TABLET, FILM COATED ORAL DAILY
Qty: 33 TABLET | Refills: 11 | Status: SHIPPED | OUTPATIENT
Start: 2023-08-11 | End: 2023-08-15

## 2023-08-11 NOTE — TELEPHONE ENCOUNTER
Pt is healed from foot surgery, will transition back to sirolimus. Starting with 3 mg loading dose, followed by 1 mg daily. Decrease tacrolimus to 1 mg in am and 2 mg in pm. Will check levels next Friday at [facility].

## 2023-08-14 DIAGNOSIS — Z94.1 HEART TRANSPLANTED: Primary | ICD-10-CM

## 2023-08-14 RX ORDER — SIROLIMUS 1 MG/1
1 TABLET, FILM COATED ORAL DAILY
Qty: 33 TABLET | Refills: 11 | OUTPATIENT
Start: 2023-08-14 | End: 2024-08-13

## 2023-08-15 RX ORDER — SIROLIMUS 1 MG/1
TABLET, FILM COATED ORAL
Qty: 33 TABLET | Refills: 11 | Status: SHIPPED | OUTPATIENT
Start: 2023-08-15 | End: 2023-08-22 | Stop reason: SDUPTHER

## 2023-08-21 ENCOUNTER — LAB VISIT (OUTPATIENT)
Dept: LAB | Facility: HOSPITAL | Age: 53
End: 2023-08-21
Attending: INTERNAL MEDICINE
Payer: COMMERCIAL

## 2023-08-21 DIAGNOSIS — Z94.1 STATUS POST HEART TRANSPLANT: ICD-10-CM

## 2023-08-21 DIAGNOSIS — T86.290 VASCULOPATHY OF CARDIAC ALLOGRAFT: ICD-10-CM

## 2023-08-21 DIAGNOSIS — Z94.1 HEART TRANSPLANTED: ICD-10-CM

## 2023-08-21 LAB
ALBUMIN SERPL BCP-MCNC: 3.7 G/DL (ref 3.5–5.2)
ALP SERPL-CCNC: 84 U/L (ref 55–135)
ALT SERPL W/O P-5'-P-CCNC: 15 U/L (ref 10–44)
ANION GAP SERPL CALC-SCNC: 10 MMOL/L (ref 8–16)
AST SERPL-CCNC: 19 U/L (ref 10–40)
BASOPHILS # BLD AUTO: 0.03 K/UL (ref 0–0.2)
BASOPHILS NFR BLD: 0.5 % (ref 0–1.9)
BILIRUB SERPL-MCNC: 0.2 MG/DL (ref 0.1–1)
BUN SERPL-MCNC: 19 MG/DL (ref 6–20)
CALCIUM SERPL-MCNC: 9.2 MG/DL (ref 8.7–10.5)
CHLORIDE SERPL-SCNC: 107 MMOL/L (ref 95–110)
CO2 SERPL-SCNC: 26 MMOL/L (ref 23–29)
CREAT SERPL-MCNC: 0.8 MG/DL (ref 0.5–1.4)
DIFFERENTIAL METHOD: ABNORMAL
EOSINOPHIL # BLD AUTO: 0.2 K/UL (ref 0–0.5)
EOSINOPHIL NFR BLD: 4 % (ref 0–8)
ERYTHROCYTE [DISTWIDTH] IN BLOOD BY AUTOMATED COUNT: 13.6 % (ref 11.5–14.5)
EST. GFR  (NO RACE VARIABLE): >60 ML/MIN/1.73 M^2
GLUCOSE SERPL-MCNC: 104 MG/DL (ref 70–110)
HCT VFR BLD AUTO: 45.2 % (ref 37–48.5)
HGB BLD-MCNC: 14 G/DL (ref 12–16)
IMM GRANULOCYTES # BLD AUTO: 0.02 K/UL (ref 0–0.04)
IMM GRANULOCYTES NFR BLD AUTO: 0.3 % (ref 0–0.5)
LYMPHOCYTES # BLD AUTO: 1.3 K/UL (ref 1–4.8)
LYMPHOCYTES NFR BLD: 21.8 % (ref 18–48)
MCH RBC QN AUTO: 27.5 PG (ref 27–31)
MCHC RBC AUTO-ENTMCNC: 31 G/DL (ref 32–36)
MCV RBC AUTO: 89 FL (ref 82–98)
MONOCYTES # BLD AUTO: 0.6 K/UL (ref 0.3–1)
MONOCYTES NFR BLD: 10.6 % (ref 4–15)
NEUTROPHILS # BLD AUTO: 3.7 K/UL (ref 1.8–7.7)
NEUTROPHILS NFR BLD: 62.8 % (ref 38–73)
NRBC BLD-RTO: 0 /100 WBC
PLATELET # BLD AUTO: 344 K/UL (ref 150–450)
PMV BLD AUTO: 9.3 FL (ref 9.2–12.9)
POTASSIUM SERPL-SCNC: 4.4 MMOL/L (ref 3.5–5.1)
PROT SERPL-MCNC: 6.6 G/DL (ref 6–8.4)
RBC # BLD AUTO: 5.09 M/UL (ref 4–5.4)
SODIUM SERPL-SCNC: 143 MMOL/L (ref 136–145)
WBC # BLD AUTO: 5.93 K/UL (ref 3.9–12.7)

## 2023-08-21 PROCEDURE — 85025 COMPLETE CBC W/AUTO DIFF WBC: CPT | Performed by: INTERNAL MEDICINE

## 2023-08-21 PROCEDURE — 36415 COLL VENOUS BLD VENIPUNCTURE: CPT | Performed by: INTERNAL MEDICINE

## 2023-08-21 PROCEDURE — 80197 ASSAY OF TACROLIMUS: CPT | Performed by: INTERNAL MEDICINE

## 2023-08-21 PROCEDURE — 80053 COMPREHEN METABOLIC PANEL: CPT | Performed by: INTERNAL MEDICINE

## 2023-08-21 PROCEDURE — 80195 ASSAY OF SIROLIMUS: CPT | Performed by: INTERNAL MEDICINE

## 2023-08-22 DIAGNOSIS — Z94.1 HEART REPLACED BY TRANSPLANT: Primary | ICD-10-CM

## 2023-08-22 DIAGNOSIS — Z94.1 HEART TRANSPLANTED: ICD-10-CM

## 2023-08-22 LAB
SIROLIMUS BLD-MCNC: 2.4 NG/ML (ref 4–20)
TACROLIMUS BLD-MCNC: 4 NG/ML (ref 5–15)

## 2023-08-22 RX ORDER — SIROLIMUS 1 MG/1
2 TABLET, FILM COATED ORAL DAILY
Qty: 60 TABLET | Refills: 11 | Status: SHIPPED | OUTPATIENT
Start: 2023-08-22 | End: 2023-08-31

## 2023-08-22 NOTE — TELEPHONE ENCOUNTER
Patient instructed to increase sirolimus to 2mg daily. Titrating to goal level of 5-10. Will get repeat level in 1 week.

## 2023-08-30 ENCOUNTER — LAB VISIT (OUTPATIENT)
Dept: LAB | Facility: HOSPITAL | Age: 53
End: 2023-08-30
Attending: INTERNAL MEDICINE
Payer: COMMERCIAL

## 2023-08-30 DIAGNOSIS — Z94.1 HEART REPLACED BY TRANSPLANT: ICD-10-CM

## 2023-08-30 PROCEDURE — 80195 ASSAY OF SIROLIMUS: CPT | Performed by: INTERNAL MEDICINE

## 2023-08-30 PROCEDURE — 36415 COLL VENOUS BLD VENIPUNCTURE: CPT | Performed by: INTERNAL MEDICINE

## 2023-08-31 DIAGNOSIS — Z94.1 HEART TRANSPLANTED: ICD-10-CM

## 2023-08-31 DIAGNOSIS — Z94.1 HEART TRANSPLANTED: Primary | ICD-10-CM

## 2023-08-31 LAB — SIROLIMUS BLD-MCNC: 4.4 NG/ML (ref 4–20)

## 2023-08-31 RX ORDER — SIROLIMUS 1 MG/1
3 TABLET, FILM COATED ORAL DAILY
Qty: 90 TABLET | Refills: 11 | Status: SHIPPED | OUTPATIENT
Start: 2023-08-31 | End: 2024-08-30

## 2023-08-31 NOTE — TELEPHONE ENCOUNTER
Repeat sirolimus level 4.4, goal is 5-10. Will increase sirolimus to 3 mg daily, which is the dose she was taking prior to her surgery. Will recheck labs Monday Sept 11 in [place]

## 2023-09-08 DIAGNOSIS — I15.8 HYPERTENSION ASSOCIATED WITH TRANSPLANTATION: ICD-10-CM

## 2023-09-08 DIAGNOSIS — Z94.9 HYPERTENSION ASSOCIATED WITH TRANSPLANTATION: ICD-10-CM

## 2023-09-11 ENCOUNTER — LAB VISIT (OUTPATIENT)
Dept: LAB | Facility: HOSPITAL | Age: 53
End: 2023-09-11
Attending: INTERNAL MEDICINE
Payer: COMMERCIAL

## 2023-09-11 DIAGNOSIS — Z94.1 HEART TRANSPLANTED: ICD-10-CM

## 2023-09-11 LAB
ANION GAP SERPL CALC-SCNC: 16 MMOL/L (ref 8–16)
BUN SERPL-MCNC: 21 MG/DL (ref 6–20)
CALCIUM SERPL-MCNC: 9.5 MG/DL (ref 8.7–10.5)
CHLORIDE SERPL-SCNC: 104 MMOL/L (ref 95–110)
CO2 SERPL-SCNC: 22 MMOL/L (ref 23–29)
CREAT SERPL-MCNC: 0.9 MG/DL (ref 0.5–1.4)
EST. GFR  (NO RACE VARIABLE): >60 ML/MIN/1.73 M^2
GLUCOSE SERPL-MCNC: 111 MG/DL (ref 70–110)
POTASSIUM SERPL-SCNC: 3.9 MMOL/L (ref 3.5–5.1)
SODIUM SERPL-SCNC: 142 MMOL/L (ref 136–145)

## 2023-09-11 PROCEDURE — 80195 ASSAY OF SIROLIMUS: CPT | Performed by: INTERNAL MEDICINE

## 2023-09-11 PROCEDURE — 36415 COLL VENOUS BLD VENIPUNCTURE: CPT | Performed by: INTERNAL MEDICINE

## 2023-09-11 PROCEDURE — 80048 BASIC METABOLIC PNL TOTAL CA: CPT | Performed by: INTERNAL MEDICINE

## 2023-09-11 RX ORDER — CANDESARTAN 8 MG/1
8 TABLET ORAL
Qty: 90 TABLET | Refills: 3 | Status: SHIPPED | OUTPATIENT
Start: 2023-09-11

## 2023-09-12 ENCOUNTER — TELEPHONE (OUTPATIENT)
Dept: TRANSPLANT | Facility: CLINIC | Age: 53
End: 2023-09-12
Payer: COMMERCIAL

## 2023-09-12 ENCOUNTER — PATIENT MESSAGE (OUTPATIENT)
Dept: TRANSPLANT | Facility: CLINIC | Age: 53
End: 2023-09-12
Payer: COMMERCIAL

## 2023-09-12 LAB — SIROLIMUS BLD-MCNC: 8.9 NG/ML (ref 4–20)

## 2023-09-18 DIAGNOSIS — Z94.1 STATUS POST HEART TRANSPLANT: Primary | ICD-10-CM

## 2023-09-18 DIAGNOSIS — T86.290 VASCULOPATHY OF CARDIAC ALLOGRAFT: ICD-10-CM

## 2023-09-22 NOTE — SUBJECTIVE & OBJECTIVE
Interval events:  Patient was seen and examined this morning at bedside. No acute overnight events. This morning she underwent RHC and EMB, with upper normal R and L filling pressures, no complications. Her theophylline level was significantly elevated so it was discontinued. She had some improvement of the shoulder pain, currently mild.       Past Medical History:   Diagnosis Date    Fractures     History of ventricular fibrillation 9/4/2019    History of ventricular tachycardia 9/4/2019    Hyperlipidemia     Migraine     Osteoarthritis        Past Surgical History:   Procedure Laterality Date    BACK SURGERY      2007    BIOPSY WITH ULTRASOUND GUIDANCE N/A 12/31/2019    Procedure: BIOPSY, WITH US GUIDANCE;  Surgeon: Eric Antunez Jr., MD;  Location: [facility] CATH LAB;  Service: Cardiology;  Laterality: N/A;    BONE GRAFT Left     from Left hip to Left FA    eardrum reconstruction  1980    ELBOW SURGERY Left 8383-9141    FOREARM FRACTURE SURGERY Bilateral 4411-0791    multiple surgeries    HEART TRANSPLANT N/A 12/16/2019    Procedure: TRANSPLANT, HEART;  Surgeon: Talha Nuñez MD;  Location: [facility] OR [address] [place] [address];  Service: Cardiothoracic;  Laterality: N/A;    INSERTION OF GRAFT TO PERICARDIUM  9/10/2019    Procedure: INSERTION, GRAFT, PERICARDIUM;  Surgeon: Shar Walden MD;  Location: [facility] OR [address] [place] [address];  Service: Cardiovascular;;    INSERTION OF IMPLANTABLE CARDIOVERTER-DEFIBRILLATOR (ICD) GENERATOR WITH TWO EXISTING LEADS      INSERTION OF PACEMAKER Left     LEFT VENTRICULAR ASSIST DEVICE N/A 9/10/2019    Procedure: INSERTION-LEFT VENTRICULAR ASSIST DEVICE;  Surgeon: Shar Walden MD;  Location: [facility] OR [address] [place] [address];  Service: Cardiovascular;  Laterality: N/A;  DT HM3     LUMBAR FUSION  2007    L4-L5    RIGHT HEART CATHETERIZATION Right 9/4/2019    Procedure: INSERTION, CATHETER, RIGHT HEART;  Surgeon: Vi Bryant MD;  Location: [facility] CATH LAB;  Service: Cardiology;  Laterality: Right;     RIGHT HEART CATHETERIZATION N/A 11/18/2019    Procedure: INSERTION, CATHETER, RIGHT HEART;  Surgeon: Eric Antunez Jr., MD;  Location: [facility] CATH LAB;  Service: Cardiology;  Laterality: N/A;    RIGHT HEART CATHETERIZATION Right 12/31/2019    Procedure: INSERTION, CATHETER, RIGHT HEART;  Surgeon: Eric Antunez Jr., MD;  Location: [facility] CATH LAB;  Service: Cardiology;  Laterality: Right;    SINUS SURGERY Right 1994    with lymph nodes    STERNAL WOUND CLOSURE  9/10/2019    Procedure: CLOSURE, WOUND, STERNUM;  Surgeon: Shar Walden MD;  Location: [facility] OR [facility];  Service: Cardiovascular;;    TEMPOROMANDIBULAR JOINT SURGERY Right 1988    TONSILLECTOMY      TREATMENT OF CARDIAC ARRHYTHMIA N/A 9/24/2019    Procedure: CARDIOVERSION;  Surgeon: Moe Barrera MD;  Location: [facility] EP LAB;  Service: Cardiology;  Laterality: N/A;  AF, DCCV/NADINE, anes, DM, Rm 3073    TYMPANOSTOMY TUBE PLACEMENT  1971- 1979    multiple tube placements       Review of patient's allergies indicates:   Allergen Reactions    Adhesive Blisters     Reaction to area in chest and up only    Codeine Itching       Current Facility-Administered Medications   Medication    acetaminophen tablet 650 mg    ALPRAZolam tablet 0.25 mg    amLODIPine tablet 10 mg    aspirin EC tablet 81 mg    atorvastatin tablet 20 mg    calcium carbonate 200 mg calcium (500 mg) chewable tablet 500 mg    calcium-vitamin D3 500 mg(1,250mg) -200 unit per tablet 2 tablet    cyclobenzaprine tablet 5 mg    docusate sodium capsule 50 mg    famotidine tablet 20 mg    ferrous sulfate EC tablet 325 mg    furosemide injection 80 mg    gabapentin capsule 300 mg    heparin (porcine) injection 5,000 Units    magnesium oxide tablet 400 mg    methyl salicylate-menthol 15-10% cream    mirtazapine tablet 30 mg    mycophenolate capsule 1,000 mg    nystatin 100,000 unit/mL suspension 500,000 Units    oxyCODONE immediate release tablet Tab 10 mg     polyethylene glycol packet 17 g    potassium bicarbonate disintegrating tablet 25 mEq    predniSONE tablet 15 mg    promethazine tablet 25 mg    senna-docusate 8.6-50 mg per tablet 2 tablet    SITagliptin tablet 50 mg    sodium chloride 0.9% flush 10 mL    sulfamethoxazole-trimethoprim 400-80mg per tablet 1 tablet    tacrolimus capsule 2 mg    valGANciclovir tablet 450 mg    venlafaxine 24 hr capsule 150 mg    zolpidem tablet 5 mg     Family History     Problem Relation (Age of Onset)    Broken bones Maternal Grandmother    Cancer Paternal Grandmother    Dislocations Maternal Grandmother    Heart failure Paternal Grandfather, Maternal Grandfather    Migraines Mother, Maternal Grandmother, Paternal Grandmother, Paternal Grandfather, Maternal Grandfather    Obesity Paternal Grandmother    Osteoarthritis Mother, Maternal Grandmother, Paternal Grandmother, Paternal Grandfather, Maternal Grandfather, Father    Osteoporosis Maternal Grandmother    Scoliosis Maternal Grandmother    Stroke Father        Tobacco Use    Smoking status: Never Smoker    Smokeless tobacco: Never Used   Substance and Sexual Activity    Alcohol use: No    Drug use: No    Sexual activity: Not Currently     Review of Systems   All other systems reviewed and are negative.    Objective:     Vital Signs (Most Recent):  Temp: 97.5 °F (36.4 °C) (01/07/20 1106)  Pulse: 102 (01/07/20 1107)  Resp: 15 (01/07/20 1106)  BP: 100/62 (01/07/20 1106)  SpO2: 96 % (01/07/20 1106) Vital Signs (24h Range):  Temp:  [97.4 °F (36.3 °C)-97.9 °F (36.6 °C)] 97.5 °F (36.4 °C)  Pulse:  [] 102  Resp:  [14-22] 15  SpO2:  [92 %-97 %] 96 %  BP: ()/(60-79) 100/62     Patient Vitals for the past 72 hrs (Last 3 readings):   Weight   01/07/20 0745 105.2 kg (232 lb)   01/05/20 0555 105.1 kg (231 lb 11.3 oz)     Body mass index is 36.34 kg/m².      Intake/Output Summary (Last 24 hours) at 1/7/2020 1212  Last data filed at 1/7/2020 0755  Gross per 24 hour    Intake 948.07 ml   Output 4400 ml   Net -3451.93 ml       Physical Exam   Constitutional: She appears well-developed and well-nourished. No distress.   HENT:   Head: Normocephalic.   Eyes: Pupils are equal, round, and reactive to light. EOM are normal.   Neck:   Thick neck, unable to appreciate JVP, prominent EJ   Cardiovascular: Normal rate, regular rhythm and normal heart sounds.   No murmur heard.  Chest with midline surgical wound closed, no erythema or discharge.    Pulmonary/Chest: Effort normal and breath sounds normal. No respiratory distress.   Abdominal: Soft. She exhibits no distension. There is no tenderness.   Musculoskeletal:   Trace LE edema. R groin with clean closed wound, no discharge or erythema, small collection palpated, mildly tender   Skin: Skin is warm.       Significant Labs:  CBC:  Recent Labs   Lab 01/05/20 0430 01/06/20 0430 01/07/20  0530   WBC 6.45 6.39 6.17   RBC 2.88* 2.86* 2.97*   HGB 7.7* 7.6* 7.8*   HCT 25.1* 24.7* 25.9*   * 338 348   MCV 87 86 87   MCH 26.7* 26.6* 26.3*   MCHC 30.7* 30.8* 30.1*     BNP:  Recent Labs   Lab 01/02/20  0835   *     CMP:  Recent Labs   Lab 01/05/20 0430 01/05/20 2027 01/06/20 0430 01/07/20  0530    162* 121* 107   CALCIUM 8.7 8.6* 8.8 8.9   ALBUMIN 2.6*  --  2.8* 2.6*   PROT 5.6*  --  5.7* 5.7*    137 138 140   K 3.9 4.4 3.5 3.1*   CO2 29 29 30* 31*   CL 95 95 95 97   BUN 40* 38* 38* 33*   CREATININE 3.2* 3.3* 3.2* 2.7*   ALKPHOS 83  --  90 87   ALT 9*  --  9* 10   AST 8*  --  9* 8*   BILITOT 0.5  --  0.5 0.4      Coagulation:   No results for input(s): PT, INR, APTT in the last 168 hours.  LDH:  No results for input(s): LDH in the last 72 hours.  Microbiology:  Microbiology Results (last 7 days)     ** No results found for the last 168 hours. **          I have reviewed all pertinent labs within the past 24 hours.       no

## 2023-10-13 NOTE — PROCEDURES
Medtronic Amplia MRI Quad CRT-D    DDD 60 - switched to VVIR    Underlying rhythm AFL with slow ventricular rate (32)    Impedance  A 399 ohms   ohms   ohms    Capture  A 0.75 V @ 0.40  RV 0.625 @ 0.40  LV4 to LV3 1.5 V @ 0.40    Total  98.7%  ASVS 1%  ASVP 98.9%  APVS <0.1%  APVP <0.1%     bpm - ATP x 6   bpm - Ramp x 2, 26 J, 35 J x 4   bpm - 35 J x 6    Episodes since 9/10/19:   VF 0  FVT 0  VT 0  AT/AF 1    Tristen Carmona MD  PGY-V   No

## 2023-11-01 NOTE — PLAN OF CARE
[facility]   Heart Transplant Clinic  [address] [place] [address]   [phone] after hours        HOME  HEALTH ORDERS      Admit to Home Health    Diagnosis:   Patient Active Problem List   Diagnosis    Knee pain    Pes anserine bursitis    Acute on chronic combined systolic and diastolic heart failure    Multinodular goiter    Abnormal CT scan    CKD (chronic kidney disease)    GLO (acute kidney injury)    Adverse effect of adrenal cortical steroids, sequela    Stress hyperglycemia    Status post heart transplant    Immunosuppression    Heart failure    CHF (congestive heart failure)    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       Patient is homebound due to: Status post- heart transplant  Diet: Low Sodium  Acitivities: As Tolerated    Nursing:   SN to complete comprehensive assessment including routine vital signs. Instruct on disease process and s/s of complications to report to MD. Review/verify medication list sent home with the patient at time of discharge  and instruct patient/caregiver as needed. Frequency may be adjusted depending on start of care date.    *Patient should have sternal precautions in place      Notify MD if SBP > 160 or < 90; DBP > 90 or < 50; HR > 120 or < 50; Temp > 101; Weight gain >3lbs in 1 day or 5lbs in 1 week.    LABS:  SN to perform labs: Tacrolimus level and BMP once a week    CONSULTS:     Physical Therapy to evaluate and treat. Evaluate for home safety and equipment needs; Establish/upgrade home exercise program. Perform / instruct on therapeutic exercises, gait training, transfer training, and Range of Motion.    Occupational Therapy to evaluate and treat. Evaluate home environment for safety and equipment needs. Perform/Instruct on transfers, ADL training, ROM, and therapeutic exercises.    Send initial Home Health orders to [facility] attending physician on call.  Send follow up  questions to [phone] or fax:                            Post Transplant: [phone]               Female

## 2023-11-21 ENCOUNTER — PATIENT MESSAGE (OUTPATIENT)
Dept: TRANSPLANT | Facility: CLINIC | Age: 53
End: 2023-11-21
Payer: COMMERCIAL

## 2023-11-27 DIAGNOSIS — I25.811 CORONARY ARTERY DISEASE INVOLVING NATIVE ARTERY OF TRANSPLANTED HEART WITHOUT ANGINA PECTORIS: ICD-10-CM

## 2023-11-27 DIAGNOSIS — Z94.1 S/P ORTHOTOPIC HEART TRANSPLANT: ICD-10-CM

## 2023-11-27 DIAGNOSIS — E78.2 MIXED HYPERLIPIDEMIA: ICD-10-CM

## 2023-11-28 RX ORDER — ROSUVASTATIN CALCIUM 40 MG/1
40 TABLET, COATED ORAL NIGHTLY
Qty: 90 TABLET | Refills: 5 | Status: SHIPPED | OUTPATIENT
Start: 2023-11-28

## 2023-12-11 ENCOUNTER — PATIENT MESSAGE (OUTPATIENT)
Dept: TRANSPLANT | Facility: CLINIC | Age: 53
End: 2023-12-11
Payer: COMMERCIAL

## 2023-12-13 ENCOUNTER — TELEPHONE (OUTPATIENT)
Dept: TRANSPLANT | Facility: CLINIC | Age: 53
End: 2023-12-13
Payer: COMMERCIAL

## 2023-12-13 NOTE — TELEPHONE ENCOUNTER
Thomas with [facility] [employer] in [place] called to get an update on recipient to give to the donor family. Advised recipient is doing very well. She will relay that information to the donor family and see if they are ready to write a letter.

## 2023-12-15 DIAGNOSIS — D86.85 CARDIAC SARCOIDOSIS: ICD-10-CM

## 2023-12-15 DIAGNOSIS — Z94.1 S/P ORTHOTOPIC HEART TRANSPLANT: Primary | ICD-10-CM

## 2024-01-13 DIAGNOSIS — Z94.1 HEART TRANSPLANTED: ICD-10-CM

## 2024-01-16 RX ORDER — PANTOPRAZOLE SODIUM 40 MG/1
40 TABLET, DELAYED RELEASE ORAL
Qty: 30 TABLET | Refills: 11 | Status: SHIPPED | OUTPATIENT
Start: 2024-01-16

## 2024-01-31 ENCOUNTER — HOSPITAL ENCOUNTER (OUTPATIENT)
Dept: RADIOLOGY | Facility: HOSPITAL | Age: 54
Discharge: HOME | End: 2024-01-31
Attending: INTERNAL MEDICINE
Payer: COMMERCIAL

## 2024-01-31 ENCOUNTER — OFFICE VISIT (OUTPATIENT)
Dept: TRANSPLANT | Facility: CLINIC | Age: 54
End: 2024-01-31
Payer: COMMERCIAL

## 2024-01-31 ENCOUNTER — HOSPITAL ENCOUNTER (OUTPATIENT)
Dept: CARDIOLOGY | Facility: HOSPITAL | Age: 54
Discharge: HOME | End: 2024-01-31
Attending: INTERNAL MEDICINE
Payer: COMMERCIAL

## 2024-01-31 ENCOUNTER — TELEPHONE (OUTPATIENT)
Dept: TRANSPLANT | Facility: CLINIC | Age: 54
End: 2024-01-31

## 2024-01-31 VITALS
WEIGHT: 238.75 LBS | HEART RATE: 117 BPM | BODY MASS INDEX: 36.19 KG/M2 | DIASTOLIC BLOOD PRESSURE: 80 MMHG | HEIGHT: 68 IN | SYSTOLIC BLOOD PRESSURE: 118 MMHG

## 2024-01-31 VITALS — WEIGHT: 219 LBS | HEIGHT: 68 IN | BODY MASS INDEX: 33.19 KG/M2

## 2024-01-31 DIAGNOSIS — T86.290 VASCULOPATHY OF CARDIAC ALLOGRAFT: ICD-10-CM

## 2024-01-31 DIAGNOSIS — Z94.1 S/P ORTHOTOPIC HEART TRANSPLANT: ICD-10-CM

## 2024-01-31 DIAGNOSIS — Z79.899 IMMUNOCOMPROMISED STATE DUE TO DRUG THERAPY: ICD-10-CM

## 2024-01-31 DIAGNOSIS — I42.5 RESTRICTIVE CARDIOMYOPATHY: ICD-10-CM

## 2024-01-31 DIAGNOSIS — D84.821 IMMUNOCOMPROMISED STATE DUE TO DRUG THERAPY: ICD-10-CM

## 2024-01-31 DIAGNOSIS — Z79.899 IMMUNODEFICIENCY DUE TO TREATMENT WITH IMMUNOSUPPRESSIVE MEDICATION: ICD-10-CM

## 2024-01-31 DIAGNOSIS — Z94.1 HEART TRANSPLANTED: Primary | ICD-10-CM

## 2024-01-31 DIAGNOSIS — D86.85 CARDIAC SARCOIDOSIS: ICD-10-CM

## 2024-01-31 DIAGNOSIS — D84.821 IMMUNODEFICIENCY DUE TO TREATMENT WITH IMMUNOSUPPRESSIVE MEDICATION: ICD-10-CM

## 2024-01-31 DIAGNOSIS — Z94.1 STATUS POST HEART TRANSPLANT: ICD-10-CM

## 2024-01-31 LAB
ASCENDING AORTA: 2.4 CM
BSA FOR ECHO PROCEDURE: 2.18 M2
CV ECHO LV RWT: 0.38 CM
CV STRESS BASE HR: 119 BPM
DIASTOLIC BLOOD PRESSURE: 76 MMHG
DOP CALC LVOT AREA: 3.4 CM2
DOP CALC LVOT DIAMETER: 2.08 CM
DOP CALC LVOT PEAK VEL: 0.8 M/S
DOP CALC LVOT STROKE VOLUME: 49.79 CM3
DOP CALCLVOT PEAK VEL VTI: 14.66 CM
E WAVE DECELERATION TIME: 151.41 MSEC
E/A RATIO: 1.98
E/E' RATIO: 6.8 M/S
ECHO LV POSTERIOR WALL: 0.83 CM (ref 0.6–1.1)
EJECTION FRACTION: 60 %
FRACTIONAL SHORTENING: 38 % (ref 28–44)
INTERVENTRICULAR SEPTUM: 0.89 CM (ref 0.6–1.1)
IVRT: 62.8 MSEC
LEFT INTERNAL DIMENSION IN SYSTOLE: 2.68 CM (ref 2.1–4)
LEFT VENTRICLE DIASTOLIC VOLUME INDEX: 39.84 ML/M2
LEFT VENTRICLE DIASTOLIC VOLUME: 84.46 ML
LEFT VENTRICLE MASS INDEX: 55 G/M2
LEFT VENTRICLE SYSTOLIC VOLUME INDEX: 12.5 ML/M2
LEFT VENTRICLE SYSTOLIC VOLUME: 26.42 ML
LEFT VENTRICULAR INTERNAL DIMENSION IN DIASTOLE: 4.33 CM (ref 3.5–6)
LEFT VENTRICULAR MASS: 117.3 G
LV LATERAL E/E' RATIO: 6.07 M/S
LV SEPTAL E/E' RATIO: 7.73 M/S
MV PEAK A VEL: 0.43 M/S
MV PEAK E VEL: 0.85 M/S
MV STENOSIS PRESSURE HALF TIME: 43.91 MS
MV VALVE AREA P 1/2 METHOD: 5.01 CM2
OHS CV CPX 1 MINUTE RECOVERY HEART RATE: 146 BPM
OHS CV CPX 85 PERCENT MAX PREDICTED HEART RATE MALE: 141
OHS CV CPX ESTIMATED METS: 8
OHS CV CPX MAX PREDICTED HEART RATE: 166
OHS CV CPX PATIENT IS FEMALE: 1
OHS CV CPX PATIENT IS MALE: 0
OHS CV CPX PEAK DIASTOLIC BLOOD PRESSURE: 74 MMHG
OHS CV CPX PEAK HEAR RATE: 155 BPM
OHS CV CPX PEAK RATE PRESSURE PRODUCT: NORMAL
OHS CV CPX PEAK SYSTOLIC BLOOD PRESSURE: 177 MMHG
OHS CV CPX PERCENT MAX PREDICTED HEART RATE ACHIEVED: 98
OHS CV CPX RATE PRESSURE PRODUCT PRESENTING: NORMAL
PISA TR MAX VEL: 2.53 M/S
RA PRESSURE ESTIMATED: 3 MMHG
RIGHT VENTRICULAR END-DIASTOLIC DIMENSION: 3.32 CM
RV TB RVSP: 6 MMHG
SINUS: 2.6 CM
STJ: 2.15 CM
STRESS ECHO POST EXERCISE DUR MIN: 5 MINUTES
STRESS ECHO POST EXERCISE DUR SEC: 15 SECONDS
SYSTOLIC BLOOD PRESSURE: 119 MMHG
TDI LATERAL: 0.14 M/S
TDI SEPTAL: 0.11 M/S
TDI: 0.13 M/S
TR MAX PG: 26 MMHG
TRICUSPID ANNULAR PLANE SYSTOLIC EXCURSION: 1.76 CM
TV REST PULMONARY ARTERY PRESSURE: 29 MMHG
Z-SCORE OF LEFT VENTRICULAR DIMENSION IN END DIASTOLE: -4.36
Z-SCORE OF LEFT VENTRICULAR DIMENSION IN END SYSTOLE: -3.34

## 2024-01-31 PROCEDURE — 3074F SYST BP LT 130 MM HG: CPT | Mod: CPTII,S$GLB,, | Performed by: INTERNAL MEDICINE

## 2024-01-31 PROCEDURE — 3079F DIAST BP 80-89 MM HG: CPT | Mod: CPTII,S$GLB,, | Performed by: INTERNAL MEDICINE

## 2024-01-31 PROCEDURE — 3008F BODY MASS INDEX DOCD: CPT | Mod: CPTII,S$GLB,, | Performed by: INTERNAL MEDICINE

## 2024-01-31 PROCEDURE — 99999 PR PBB SHADOW E&M-EST. PATIENT-LVL IV: CPT | Mod: PBBFAC,,, | Performed by: INTERNAL MEDICINE

## 2024-01-31 PROCEDURE — 93351 STRESS TTE COMPLETE: CPT | Mod: 26,,, | Performed by: INTERNAL MEDICINE

## 2024-01-31 PROCEDURE — 71046 X-RAY EXAM CHEST 2 VIEWS: CPT | Mod: TC,FY

## 2024-01-31 PROCEDURE — 3044F HG A1C LEVEL LT 7.0%: CPT | Mod: CPTII,S$GLB,, | Performed by: INTERNAL MEDICINE

## 2024-01-31 PROCEDURE — 71046 X-RAY EXAM CHEST 2 VIEWS: CPT | Mod: 26,,, | Performed by: RADIOLOGY

## 2024-01-31 PROCEDURE — 93351 STRESS TTE COMPLETE: CPT

## 2024-01-31 PROCEDURE — 1159F MED LIST DOCD IN RCRD: CPT | Mod: CPTII,S$GLB,, | Performed by: INTERNAL MEDICINE

## 2024-01-31 PROCEDURE — 99215 OFFICE O/P EST HI 40 MIN: CPT | Mod: S$GLB,,, | Performed by: INTERNAL MEDICINE

## 2024-01-31 NOTE — TELEPHONE ENCOUNTER
Patient presents to \[facility]\"" clinic for 4 year post heart transplant visit. Patient states she has had a rough year with personal loss and taking care of her mother but is doing well physically. No shortness of breath or chest pain. Does endorse swelling in bilateral legs at night but this is not new and has no other associated symptoms. 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 Patient is not up to date on annual wellness checks (derm, dental and mammogram) due to personal issues th is past year but is attempting to get scheduled now. Sarcoid pet ordered and scheduled in march. Chart and results reviewed with Dr. Escalera.

## 2024-01-31 NOTE — PROGRESS NOTES
Subjective:   Ms. Navas is a 53 y.o. year old White female who received a donation after brain death heart transplant on 12/16/19.      CMV status:   Donor: +  Recipient: -    52 YO F s/p OHT 12/16/2019 is here for  post OHT visit.  She had extensive sarcoidosis on her explanted heart which was a new diagnosis for her at the time. She established care with rheumatology who believe that her IS for OHT is keeping her sarcoid under control as there is no evidence of extracardiac sarcoid at this time. 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    She was seen in clinic las 6 months prior and comes in today for follow up.    Since her last clinic visit, she says that she is doing well.  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  She says that at present however outside of her job she has not very physically active but is trying to get back to activity such as going for walks.  With her day-to-day activities he denies any cardiac symptoms such as chest discomfort, shortness of breath, palpitations, presyncope, syncope, leg swelling, PND, or orthopnea.    Post Heart Transplant  (Please do not erase heart tx data)  Sarcoid - pt to remain on pred for life (will need full body PET yearly)     mmunosuppression: rapa, MMF, pred  Immuno goal levels: rapa 5-10 (transitioning back to rapa 8/11/23)  Immuno history :1000 mg bid MMF CMV High Risk donor; reduced to 500 bid 1/20/20 N/V)  Transitioned to rapa due to CAV     Previous MCS:YesLVAD   High Risk Donor: Yes    Rejection status: Low               Positive Cross Match: Neg  Induction: Yes Thymo x2     Rejections: No   Biopsy 3/29/2021 ISHLT 0, pAMR 0, C4D neg     HLA/ DSA: 6/21/2023 None        C1Q: none     Last Echo: 6/19/2023 60%     CMV status:D+ R - high risk 4/20/20 CMV Igg Non reactive     Last LHC: 3/18/22 [place] Coronaries Normal; IVUS LM 0.5 mm; LAD Prox 0.9 mm; Mid 0.8 mm  12/3/20 [place] Coronaries normal; IVUS normal  Baseline not  "done due to Cr/ R groin issues     Prior or present malignancies:  none     Other complications: 5/27/20 R Groin Lymphocele surgery; wound vac      Sarcoidosis Pet Scan 8/9/2023 no cardiac sarcoid seen on scan     Health Maintenance:   CXR 1/31/24: negative  BMD 8/9/23: Consider FDA approved medical therapies in postmenopausal women and men aged 50 years and older  Mammogram 4/7/22 normal  Colonoscopy 6/1/2022 normal repeat in 5 years  Spenectomy 6/22    Review of Systems   Constitutional: Negative for chills and fever.   HENT:  Negative for hearing loss.    Eyes:  Negative for visual disturbance.   Cardiovascular:  Negative for chest pain, dyspnea on exertion, irregular heartbeat, leg swelling, orthopnea, palpitations, paroxysmal nocturnal dyspnea and syncope.   Respiratory:  Negative for cough and shortness of breath.    Musculoskeletal:  Negative for muscle weakness.   Gastrointestinal:  Negative for diarrhea, nausea and vomiting.   Neurological:  Negative for focal weakness.   Psychiatric/Behavioral:  Negative for memory loss.        Objective:   Blood pressure 118/80, pulse (!) 117, height 5' 8" (1.727 m), weight 108.3 kg (238 lb 12.1 oz).body mass index is 36.3 kg/m².    Physical Exam  Constitutional:       Appearance: Normal appearance.   HENT:      Head: Atraumatic.   Eyes:      Extraocular Movements: Extraocular movements intact.   Cardiovascular:      Rate and Rhythm: Normal rate and regular rhythm.      Pulses: Normal pulses.      Heart sounds: Normal heart sounds.   Pulmonary:      Breath sounds: Normal breath sounds.   Abdominal:      Palpations: Abdomen is soft.      Tenderness: There is no abdominal tenderness.   Musculoskeletal:         General: Normal range of motion.      Right lower leg: No edema.      Left lower leg: No edema.   Neurological:      General: No focal deficit present.      Mental Status: She is alert and oriented to person, place, and time.         Lab Results   Component Value Date "    WBC 5.45 01/31/2024    HGB 13.5 01/31/2024    HCT 43.6 01/31/2024    MCV 85 01/31/2024     01/31/2024    CO2 26 01/31/2024    CREATININE 1.1 01/31/2024    CALCIUM 10.0 01/31/2024    ALBUMIN 3.9 01/31/2024    AST 15 01/31/2024    BNP 26 01/31/2024    ALT 17 01/31/2024       Lab Results   Component Value Date    INR 1.2 12/23/2019    INR 1.4 (H) 12/22/2019    INR 1.3 (H) 12/21/2019       BNP   Date Value Ref Range Status   01/31/2024 26 0 - 99 pg/mL Final     Comment:     Values of less than 100 pg/ml are consistent with non-CHF populations.   06/21/2023 50 0 - 99 pg/mL Final     Comment:     Values of less than 100 pg/ml are consistent with non-CHF populations.   12/22/2022 69 0 - 99 pg/mL Final     Comment:     Values of less than 100 pg/ml are consistent with non-CHF populations.       LD   Date Value Ref Range Status   12/16/2019 240 110 - 260 U/L Final     Comment:     Results are increased in hemolyzed samples.   12/15/2019 246 110 - 260 U/L Final     Comment:     Results are increased in hemolyzed samples.   12/14/2019 257 110 - 260 U/L Final     Comment:     Results are increased in hemolyzed samples.       Tacrolimus Lvl   Date Value Ref Range Status   08/21/2023 4.0 (L) 5.0 - 15.0 ng/mL Final     Comment:     Testing performed by a chemiluminescent microparticle   immunoassay on the [employer] System.    CAUTION: No firm therapeutic range exists for tacrolimus in whole   blood. The   complexity of the clinical state, individual differences in   sensitivity to   immunosuppressive and nephrotoxic effects of tacrolimus,   co-administration   of other immunosuppressants, type of transplant, time post-transplant   and a   number of other factors contribute to different requirements for   optimal   blood levels of tacrolimus. Therefore, individual tacrolimus values   cannot   be used as the sole indicator for making changes in treatment regimen   and   each patient should be thoroughly evaluated  "clinically before changes   in   treatment regimens are made. Each user must establish his or her own   ranges   based on clinical experience.  Therapeutic ranges vary according to the commercial test used, and   therefore   should be established for each commercial test. Values obtained with   different assay methods cannot be used interchangeably due to   differences in   assay methods and cross-reactivity with metabolites, nor should   correction   factors be applied. Therefore, consistent use of one assay for   individual   patients is recommended.       No results found for: "SIROLIMUS"  No results found for: "CYCLOSPORINE"    No results found for this or any previous visit.    No results found for this or any previous visit.      Labs were reviewed with the patient.    Assessment:     1. Heart transplanted    2. 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 Restrictive cardiomyopathy post heart transplant    4. S/P orthotopic heart transplant    5. Immunocompromised state due to drug therapy    6. Immunodeficiency due to treatment with immunosuppressive medication        Plan:     - presents today for follow-up.  Doing well.  Euvolemic on exam.  No cardiovascular complaints.  - labs stable.  Stress echocardiogram was done and read is still pending at the time of my visit.  - no changes to her medical therapy at this time.  Continue follow-up with rheumatology for her sarcoidosis.  Scheduled for PET whole body scan.      Return instructions as set forth by post transplant schedule or as needed:    Clinic: Return for labs and/or biopsy weekly the first month, every two weeks during month 2 and then monthly for the first year at the provider or coordinator's discretion. During the second year, return to clinic every 3 months. Post transplant year 3-5 return every 6 months. There will be a comprehensive post transplant evaluation every year that may include LHC/RHC/biopsy, stress " test, echo, CXR, and other health screening exams.    In addition to the clinical assessment, I have ordered Allomap testing for this patient to assist in identification of moderate/severe acute cellular rejection (ACR) in a pt with stable Allograft function instead of endomyocardial biopsy.     Patient is reminded to call with any health changes as these can be early signs of transplant complications. Patient is advised to make sure any new medications or changes of old medications are discussed with a pharmacist or physician knowledgeable with transplant to avoid rejection/drug toxicity related to significant drug interactions.    Patient advised that it is recommended that all transplanted patients, and their close contacts and household members receive Covid vaccination.    UNOS Patient Status  Functional Status: 90% - Able to carry on normal activity: minor symptoms of disease  Physical Capacity: No Limitations  Working for Income: yes  If yes, working activity level: Working Full Time    Avni Escalera MD

## 2024-01-31 NOTE — LETTER
January 31, 2024        Joaquin Del Toro  [address]  [address]  [facility] [address]  Phone: [phone]  Fax: [phone]             [facility]  [address]  [facility] [address]  Phone: [phone]   Patient: Deborah Navas   MR Number: [id number]   YOB: 1970   Date of Visit: 1/31/2024       Dear Dr. Joaquin Del Toro    Thank you for referring Deborah Navas to me for evaluation. Attached you will find relevant portions of my assessment and plan of care.    If you have questions, please do not hesitate to call me. I look forward to following Deborah Navas along with you.    Sincerely,    Avni Escalera MD    Enclosure    If you would like to receive this communication electronically, please contact [email or web address] or [phone] to request [employer] access.    [employer] is a tool which provides read-only access to select patient information with whom you have a relationship. Its easy to use and provides real time access to review your patients record including encounter summaries, notes, results, and demographic information.    If you feel you have received this communication in error or would no longer like to receive these types of communications, please e-mail [email or web address]

## 2024-02-01 ENCOUNTER — PATIENT MESSAGE (OUTPATIENT)
Dept: TRANSPLANT | Facility: CLINIC | Age: 54
End: 2024-02-01
Payer: COMMERCIAL

## 2024-02-02 ENCOUNTER — PATIENT MESSAGE (OUTPATIENT)
Dept: TRANSPLANT | Facility: CLINIC | Age: 54
End: 2024-02-02
Payer: COMMERCIAL

## 2024-02-11 DIAGNOSIS — Z94.1 STATUS POST HEART TRANSPLANT: ICD-10-CM

## 2024-02-12 RX ORDER — PREDNISONE 5 MG/1
TABLET ORAL
Qty: 30 TABLET | Refills: 11 | Status: SHIPPED | OUTPATIENT
Start: 2024-02-12

## 2024-02-26 NOTE — ASSESSMENT & PLAN NOTE
-NICM  -Last 2D Echo 10/29/19: LVEF 20%, LVEDD 5.24 cm with speed at 5300  -Appears hypervolemic on exam; neck veins, weight and BNP up.  Will diurese with IV Lasix and monitor response.  If renal function does not improve with diuresis then will plan for RHC tomorrow  -2g Na dietary restriction, 1500 mL fluid restriction, strict I/Os     Discharge planning was discussed with the Critical Care Interdisciplinary Team. The patient is a potential discharge today.

## 2024-02-29 NOTE — PLAN OF CARE
Pt aao x4. Call bell within reach. She is up independent. RHC with heart biopsy done this am. Dressing to right neck CDI. 20g peripheral iv placed per PICC team. Plan to continue iv diuresis today. CR down to 2.7. Possible discharge Thursday or Friday. Will continue to monitor.    [Never] : never [TextBox_4] :  nasal congestion onset Tuesday  sputum clear hoarse  voice no fever  nl appeptie Positive cough COVID AG neg x 2 Initially thought it may be allergy related but now feels like she does have an active sinus infection No active respiratory symptoms No fever or chills

## 2024-03-11 RX ORDER — GABAPENTIN 300 MG/1
CAPSULE ORAL
Qty: 30 CAPSULE | Refills: 12 | Status: SHIPPED | OUTPATIENT
Start: 2024-03-11

## 2024-03-12 ENCOUNTER — TELEPHONE (OUTPATIENT)
Dept: CARDIOLOGY | Facility: HOSPITAL | Age: 54
End: 2024-03-12
Payer: COMMERCIAL

## 2024-03-14 ENCOUNTER — HOSPITAL ENCOUNTER (OUTPATIENT)
Dept: CARDIOLOGY | Facility: HOSPITAL | Age: 54
Discharge: HOME | End: 2024-03-14
Attending: INTERNAL MEDICINE
Payer: COMMERCIAL

## 2024-03-14 VITALS — HEIGHT: 68 IN | BODY MASS INDEX: 36.07 KG/M2 | HEART RATE: 113 BPM | WEIGHT: 238 LBS

## 2024-03-14 DIAGNOSIS — Z94.1 S/P ORTHOTOPIC HEART TRANSPLANT: ICD-10-CM

## 2024-03-14 DIAGNOSIS — D86.85 CARDIAC SARCOIDOSIS: ICD-10-CM

## 2024-03-14 LAB
EJECTION FRACTION- HIGH: 59 %
END DIASTOLIC INDEX-HIGH: 155 ML/M2
END DIASTOLIC INDEX-LOW: 91 ML/M2
END SYSTOLIC INDEX-HIGH: 78 ML/M2
END SYSTOLIC INDEX-LOW: 40 ML/M2
NUC REST DIASTOLIC VOLUME INDEX: 49
NUC REST EJECTION FRACTION: 80
NUC REST SYSTOLIC VOLUME INDEX: 10
RETIRED EF AND QEF - SEE NOTES: 47 %

## 2024-03-14 PROCEDURE — A9552 F18 FDG: HCPCS | Performed by: INTERNAL MEDICINE

## 2024-03-14 PROCEDURE — A9555 RB82 RUBIDIUM: HCPCS | Performed by: INTERNAL MEDICINE

## 2024-03-14 PROCEDURE — 78433 MYOCRD IMG PET 2RTRACER CT: CPT | Mod: 26,,, | Performed by: INTERNAL MEDICINE

## 2024-03-14 PROCEDURE — 78433 MYOCRD IMG PET 2RTRACER CT: CPT

## 2024-03-14 RX ORDER — FLUDEOXYGLUCOSE F18 500 MCI/ML
17.1 INJECTION INTRAVENOUS
Status: COMPLETED | OUTPATIENT
Start: 2024-03-14 | End: 2024-03-14

## 2024-03-14 RX ADMIN — FLUDEOXYGLUCOSE F-18 17.1 MILLICURIE: 500 INJECTION INTRAVENOUS at 01:03

## 2024-03-14 RX ADMIN — RUBIDIUM CHLORIDE RB-82 30 MILLICURIE: 150 INJECTION, SOLUTION INTRAVENOUS at 01:03

## 2024-03-18 ENCOUNTER — TELEPHONE (OUTPATIENT)
Dept: TRANSPLANT | Facility: CLINIC | Age: 54
End: 2024-03-18
Payer: COMMERCIAL

## 2024-03-18 DIAGNOSIS — Z94.1 STATUS POST HEART TRANSPLANT: ICD-10-CM

## 2024-03-18 DIAGNOSIS — Z79.899 ENCOUNTER FOR LONG-TERM (CURRENT) USE OF MEDICATIONS: ICD-10-CM

## 2024-03-18 DIAGNOSIS — T86.20 COMPLICATION OF HEART TRANSPLANT, UNSPECIFIED COMPLICATION: Primary | ICD-10-CM

## 2024-03-18 DIAGNOSIS — Z94.1 STATUS POST HEART TRANSPLANT: Primary | ICD-10-CM

## 2024-03-18 NOTE — TELEPHONE ENCOUNTER
----- Message from Miriam Prieto MD sent at 3/17/2024 10:54 PM CDT -----  Hello all, can we please see if Dr. Antunez would review? Seems like may need pulse dose steroids, want to make sure there was no question of this being rejection possibly.

## 2024-03-19 NOTE — TELEPHONE ENCOUNTER
Spoke with pt after speaking with Dr. Antunez and will schedule an Echo and EKG and clinic visit in 2 weeks, Dr. Peres is on Consult service and will see her in the Afternoon after echo and EKG.   Pt asked about getting tests completed in . I was unable to get echo and EKG in [place] but able to schedule on 4/3/24.

## 2024-03-25 ENCOUNTER — LAB VISIT (OUTPATIENT)
Dept: LAB | Facility: HOSPITAL | Age: 54
End: 2024-03-25
Attending: STUDENT IN AN ORGANIZED HEALTH CARE EDUCATION/TRAINING PROGRAM
Payer: COMMERCIAL

## 2024-03-25 ENCOUNTER — OFFICE VISIT (OUTPATIENT)
Dept: RHEUMATOLOGY | Facility: CLINIC | Age: 54
End: 2024-03-25
Payer: COMMERCIAL

## 2024-03-25 VITALS
WEIGHT: 238 LBS | DIASTOLIC BLOOD PRESSURE: 89 MMHG | HEIGHT: 68 IN | BODY MASS INDEX: 36.07 KG/M2 | HEART RATE: 118 BPM | SYSTOLIC BLOOD PRESSURE: 128 MMHG

## 2024-03-25 DIAGNOSIS — D86.85 CARDIAC SARCOIDOSIS: Primary | ICD-10-CM

## 2024-03-25 DIAGNOSIS — D86.85 CARDIAC SARCOIDOSIS: ICD-10-CM

## 2024-03-25 PROCEDURE — 3044F HG A1C LEVEL LT 7.0%: CPT | Mod: CPTII,S$GLB,, | Performed by: STUDENT IN AN ORGANIZED HEALTH CARE EDUCATION/TRAINING PROGRAM

## 2024-03-25 PROCEDURE — 3079F DIAST BP 80-89 MM HG: CPT | Mod: CPTII,S$GLB,, | Performed by: STUDENT IN AN ORGANIZED HEALTH CARE EDUCATION/TRAINING PROGRAM

## 2024-03-25 PROCEDURE — 4010F ACE/ARB THERAPY RXD/TAKEN: CPT | Mod: CPTII,S$GLB,, | Performed by: STUDENT IN AN ORGANIZED HEALTH CARE EDUCATION/TRAINING PROGRAM

## 2024-03-25 PROCEDURE — 1159F MED LIST DOCD IN RCRD: CPT | Mod: CPTII,S$GLB,, | Performed by: STUDENT IN AN ORGANIZED HEALTH CARE EDUCATION/TRAINING PROGRAM

## 2024-03-25 PROCEDURE — 3008F BODY MASS INDEX DOCD: CPT | Mod: CPTII,S$GLB,, | Performed by: STUDENT IN AN ORGANIZED HEALTH CARE EDUCATION/TRAINING PROGRAM

## 2024-03-25 PROCEDURE — 99214 OFFICE O/P EST MOD 30 MIN: CPT | Mod: S$GLB,,, | Performed by: STUDENT IN AN ORGANIZED HEALTH CARE EDUCATION/TRAINING PROGRAM

## 2024-03-25 PROCEDURE — 83520 IMMUNOASSAY QUANT NOS NONAB: CPT | Performed by: STUDENT IN AN ORGANIZED HEALTH CARE EDUCATION/TRAINING PROGRAM

## 2024-03-25 PROCEDURE — 1160F RVW MEDS BY RX/DR IN RCRD: CPT | Mod: CPTII,S$GLB,, | Performed by: STUDENT IN AN ORGANIZED HEALTH CARE EDUCATION/TRAINING PROGRAM

## 2024-03-25 PROCEDURE — 36415 COLL VENOUS BLD VENIPUNCTURE: CPT | Performed by: STUDENT IN AN ORGANIZED HEALTH CARE EDUCATION/TRAINING PROGRAM

## 2024-03-25 PROCEDURE — 3074F SYST BP LT 130 MM HG: CPT | Mod: CPTII,S$GLB,, | Performed by: STUDENT IN AN ORGANIZED HEALTH CARE EDUCATION/TRAINING PROGRAM

## 2024-03-25 PROCEDURE — 99999 PR PBB SHADOW E&M-EST. PATIENT-LVL III: CPT | Mod: PBBFAC,,, | Performed by: STUDENT IN AN ORGANIZED HEALTH CARE EDUCATION/TRAINING PROGRAM

## 2024-03-25 PROCEDURE — 82164 ANGIOTENSIN I ENZYME TEST: CPT | Performed by: STUDENT IN AN ORGANIZED HEALTH CARE EDUCATION/TRAINING PROGRAM

## 2024-03-25 RX ORDER — MULTIVITAMIN
1 TABLET ORAL EVERY MORNING
COMMUNITY
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
## 2024-03-27 DIAGNOSIS — Z94.1 STATUS POST HEART TRANSPLANT: ICD-10-CM

## 2024-03-27 LAB — ACE SERPL-CCNC: 39 U/L (ref 16–85)

## 2024-03-28 LAB — SOL IL2 RECEP SERPL-MCNC: 653.1 PG/ML (ref 175.3–858.2)

## 2024-03-28 RX ORDER — MYCOPHENOLATE MOFETIL 250 MG/1
CAPSULE ORAL
Qty: 240 CAPSULE | Refills: 11 | Status: SHIPPED | OUTPATIENT
Start: 2024-03-28

## 2024-04-03 ENCOUNTER — HOSPITAL ENCOUNTER (OUTPATIENT)
Dept: CARDIOLOGY | Facility: HOSPITAL | Age: 54
Discharge: HOME | End: 2024-04-03
Attending: INTERNAL MEDICINE
Payer: COMMERCIAL

## 2024-04-03 ENCOUNTER — HOSPITAL ENCOUNTER (OUTPATIENT)
Dept: CARDIOLOGY | Facility: CLINIC | Age: 54
Discharge: HOME | End: 2024-04-03
Attending: INTERNAL MEDICINE
Payer: COMMERCIAL

## 2024-04-03 ENCOUNTER — OFFICE VISIT (OUTPATIENT)
Dept: TRANSPLANT | Facility: CLINIC | Age: 54
End: 2024-04-03
Attending: INTERNAL MEDICINE
Payer: COMMERCIAL

## 2024-04-03 ENCOUNTER — TELEPHONE (OUTPATIENT)
Dept: TRANSPLANT | Facility: CLINIC | Age: 54
End: 2024-04-03
Payer: COMMERCIAL

## 2024-04-03 VITALS
HEART RATE: 124 BPM | BODY MASS INDEX: 36.38 KG/M2 | DIASTOLIC BLOOD PRESSURE: 84 MMHG | WEIGHT: 240.06 LBS | HEIGHT: 68 IN | SYSTOLIC BLOOD PRESSURE: 137 MMHG

## 2024-04-03 VITALS
DIASTOLIC BLOOD PRESSURE: 89 MMHG | SYSTOLIC BLOOD PRESSURE: 128 MMHG | BODY MASS INDEX: 36.07 KG/M2 | HEART RATE: 98 BPM | HEIGHT: 68 IN | WEIGHT: 238 LBS

## 2024-04-03 DIAGNOSIS — T86.20 COMPLICATION OF HEART TRANSPLANT, UNSPECIFIED COMPLICATION: ICD-10-CM

## 2024-04-03 DIAGNOSIS — D86.85 CARDIAC SARCOIDOSIS: ICD-10-CM

## 2024-04-03 DIAGNOSIS — Z79.899 IMMUNODEFICIENCY DUE TO TREATMENT WITH IMMUNOSUPPRESSIVE MEDICATION: ICD-10-CM

## 2024-04-03 DIAGNOSIS — Z94.1 S/P ORTHOTOPIC HEART TRANSPLANT: Primary | ICD-10-CM

## 2024-04-03 DIAGNOSIS — D84.821 IMMUNODEFICIENCY DUE TO TREATMENT WITH IMMUNOSUPPRESSIVE MEDICATION: ICD-10-CM

## 2024-04-03 DIAGNOSIS — Z79.899 ENCOUNTER FOR LONG-TERM (CURRENT) USE OF MEDICATIONS: ICD-10-CM

## 2024-04-03 DIAGNOSIS — Z94.1 STATUS POST HEART TRANSPLANT: ICD-10-CM

## 2024-04-03 DIAGNOSIS — R94.8 ABNORMAL POSITRON EMISSION TOMOGRAPHY (PET) SCAN: ICD-10-CM

## 2024-04-03 LAB
ASCENDING AORTA: 2.49 CM
AV INDEX (PROSTH): 0.66
AV MEAN GRADIENT: 6 MMHG
AV PEAK GRADIENT: 10 MMHG
AV VALVE AREA BY VELOCITY RATIO: 2.38 CM²
AV VALVE AREA: 2.56 CM²
AV VELOCITY RATIO: 0.62
BSA FOR ECHO PROCEDURE: 2.27 M2
CV ECHO LV RWT: 0.31 CM
DOP CALC AO PEAK VEL: 1.59 M/S
DOP CALC AO VTI: 29.67 CM
DOP CALC LVOT AREA: 3.9 CM2
DOP CALC LVOT DIAMETER: 2.22 CM
DOP CALC LVOT PEAK VEL: 0.98 M/S
DOP CALC LVOT STROKE VOLUME: 75.91 CM3
DOP CALCLVOT PEAK VEL VTI: 19.62 CM
E WAVE DECELERATION TIME: 173.36 MSEC
E/A RATIO: 1.39
ECHO LV POSTERIOR WALL: 0.67 CM (ref 0.6–1.1)
FRACTIONAL SHORTENING: 36 % (ref 28–44)
INTERVENTRICULAR SEPTUM: 0.62 CM (ref 0.6–1.1)
IVRT: 79.92 MSEC
LEFT ATRIUM SIZE: 3.93 CM
LEFT INTERNAL DIMENSION IN SYSTOLE: 2.76 CM (ref 2.1–4)
LEFT VENTRICLE DIASTOLIC VOLUME INDEX: 38.5 ML/M2
LEFT VENTRICLE DIASTOLIC VOLUME: 84.32 ML
LEFT VENTRICLE MASS INDEX: 37 G/M2
LEFT VENTRICLE SYSTOLIC VOLUME INDEX: 13 ML/M2
LEFT VENTRICLE SYSTOLIC VOLUME: 28.51 ML
LEFT VENTRICULAR INTERNAL DIMENSION IN DIASTOLE: 4.33 CM (ref 3.5–6)
LEFT VENTRICULAR MASS: 80.74 G
LV LATERAL E/E' RATIO: 5.07 M/S
MV PEAK A VEL: 0.51 M/S
MV PEAK E VEL: 0.71 M/S
MV STENOSIS PRESSURE HALF TIME: 50.27 MS
MV VALVE AREA P 1/2 METHOD: 4.38 CM2
OHS QRS DURATION: 88 MS
OHS QTC CALCULATION: 455 MS
PISA TR MAX VEL: 1.74 M/S
RA PRESSURE ESTIMATED: 3 MMHG
RIGHT VENTRICULAR END-DIASTOLIC DIMENSION: 3.78 CM
RV TB RVSP: 5 MMHG
SINUS: 2.89 CM
STJ: 2.45 CM
TDI LATERAL: 0.14 M/S
TR MAX PG: 12 MMHG
TRICUSPID ANNULAR PLANE SYSTOLIC EXCURSION: 1.35 CM
TV REST PULMONARY ARTERY PRESSURE: 15 MMHG
Z-SCORE OF LEFT VENTRICULAR DIMENSION IN END DIASTOLE: -5.36
Z-SCORE OF LEFT VENTRICULAR DIMENSION IN END SYSTOLE: -3.86

## 2024-04-03 PROCEDURE — 1159F MED LIST DOCD IN RCRD: CPT | Mod: CPTII,S$GLB,, | Performed by: INTERNAL MEDICINE

## 2024-04-03 PROCEDURE — 3079F DIAST BP 80-89 MM HG: CPT | Mod: CPTII,S$GLB,, | Performed by: INTERNAL MEDICINE

## 2024-04-03 PROCEDURE — 1160F RVW MEDS BY RX/DR IN RCRD: CPT | Mod: CPTII,S$GLB,, | Performed by: INTERNAL MEDICINE

## 2024-04-03 PROCEDURE — 3008F BODY MASS INDEX DOCD: CPT | Mod: CPTII,S$GLB,, | Performed by: INTERNAL MEDICINE

## 2024-04-03 PROCEDURE — 99999 PR PBB SHADOW E&M-EST. PATIENT-LVL V: CPT | Mod: PBBFAC,,, | Performed by: INTERNAL MEDICINE

## 2024-04-03 PROCEDURE — 93005 ELECTROCARDIOGRAM TRACING: CPT | Mod: S$GLB,,, | Performed by: INTERNAL MEDICINE

## 2024-04-03 PROCEDURE — 3075F SYST BP GE 130 - 139MM HG: CPT | Mod: CPTII,S$GLB,, | Performed by: INTERNAL MEDICINE

## 2024-04-03 PROCEDURE — 93306 TTE W/DOPPLER COMPLETE: CPT

## 2024-04-03 PROCEDURE — 93306 TTE W/DOPPLER COMPLETE: CPT | Mod: 26,,, | Performed by: INTERNAL MEDICINE

## 2024-04-03 PROCEDURE — 3044F HG A1C LEVEL LT 7.0%: CPT | Mod: CPTII,S$GLB,, | Performed by: INTERNAL MEDICINE

## 2024-04-03 PROCEDURE — 4010F ACE/ARB THERAPY RXD/TAKEN: CPT | Mod: CPTII,S$GLB,, | Performed by: INTERNAL MEDICINE

## 2024-04-03 PROCEDURE — 93010 ELECTROCARDIOGRAM REPORT: CPT | Mod: S$GLB,,, | Performed by: INTERNAL MEDICINE

## 2024-04-03 PROCEDURE — 99214 OFFICE O/P EST MOD 30 MIN: CPT | Mod: S$GLB,,, | Performed by: INTERNAL MEDICINE

## 2024-04-03 NOTE — LETTER
April 3, 2024        Joaquin Del Toro  [address]  [address]  [facility] [address]  Phone: [phone]  Fax: [phone]             [id number]  [address]  [facility] [address]  Phone: [phone]   Patient: Deborah Navas   MR Number: [id number]   YOB: 1970   Date of Visit: 4/3/2024       Dear Dr. Joaquin Del Toro    Thank you for referring Deborah Navas to me for evaluation. Attached you will find relevant portions of my assessment and plan of care.    If you have questions, please do not hesitate to call me. I look forward to following Deborah Navas along with you.    Sincerely,    Eric Antunez Jr, MD    Enclosure    If you would like to receive this communication electronically, please contact [email or web address] or [phone] to request [facility] Link access.    [facility] Link is a tool which provides read-only access to select patient information with whom you have a relationship. Its easy to use and provides real time access to review your patients record including encounter summaries, notes, results, and demographic information.    If you feel you have received this communication in error or would no longer like to receive these types of communications, please e-mail [email or web address]

## 2024-04-03 NOTE — Clinical Note
Marjorie, I found my original protocol post tx and that is where the post tx baseline and every 6 months came from.  I am no longer recommending that but at least I see where is came from

## 2024-04-03 NOTE — TELEPHONE ENCOUNTER
Reason for visit:  4 years and 3 months post heart transplant clinic visit. Pt's Cardiac PET Sarcoid on 3/14/24 Positive - here in clinic For evaluate with echo and EKG.  Immunosuppression:  Sirolimus 3 mg daily  MMF 1000 mg bid  Prednisone 5 mg daily    Assessment:     Psychosocial Evaluation:  -physical activity:none, will try and do   -work; [employer]   -transportation:Car  -support:family    Education: Exercise, Pt stated she has an Elliptical at home that she does not use. Encouraged her to use it for 10 minutes a day a couple of days a week and then increase it from there.     Medications, lab and echo results reviewed with patient and Dr. Antunez. See MD note for orders and recommendations    Next follow up in 3 month labs, her 4 1/2 year f/u echo and EKG and clinic appt.

## 2024-04-03 NOTE — PROGRESS NOTES
"Subjective:   Ms. Navas is a 53 y.o. year old White female who received a donation after brain death heart transplant on 12/16/19.      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 is here for review of abnormal cardiac PET-FDG study.  Dr. Escalera had ordered whole body PET for her sarcoidosis that was not approved.  A cardiac PET FDG was obtained instead and was abnormal.  She comes to discuss the result and options.  She has absolutely no cardiac complaints and remains physically active.  Her EKG today does not demonstrate any conduction disturbance in her echocardiogram is normal.  She has seen Rheumatology and eye specialist both of whom told her there was no evidence of sarcoidosis.  She did have extensive sarcoidosis on her explanted heart (surprise finding).      Immunosuppression regimen includes; sirolimus 3 mg daily, Cellcept 1000 mg BID, Prednisone 5 mg qd (left on due to sarcoidosis)    Objective:   Blood pressure 137/84, pulse (!) 124, height 5' 8" (1.727 m), weight 108.9 kg (240 lb 1.3 oz).body mass index is 36.5 kg/m².    Physical Exam  Constitutional:       General: She is not in acute distress.     Appearance: She is well-developed. She is not ill-appearing, toxic-appearing or diaphoretic.      Comments: /84 (BP Location: Right arm, Patient Position: Sitting, BP Method: Medium (Automatic))   Pulse (!) 124   Ht 5' 8" (1.727 m)   Wt 108.9 kg (240 lb 1.3 oz)   LMP  (LMP Unknown) Comment: LMP 2015 - pt states no procedure was done to stop cycles, they stopped naturally  BMI 36.50 kg/m²   Friendly white female in no acute distress   HENT:      Head: Normocephalic and atraumatic.   Eyes:      General: No scleral icterus.        Right eye: No discharge.         Left eye: No discharge.      Conjunctiva/sclera: Conjunctivae normal.   Neck:      Thyroid: No thyromegaly.      Vascular: No JVD. "      Trachea: No tracheal deviation.   Cardiovascular:      Rate and Rhythm: Normal rate and regular rhythm.      Heart sounds: Normal heart sounds. No murmur heard.     No gallop.   Pulmonary:      Effort: Pulmonary effort is normal.      Breath sounds: Normal breath sounds.   Abdominal:      General: Bowel sounds are normal. There is no distension.      Palpations: Abdomen is soft. There is no mass.      Tenderness: There is no abdominal tenderness. There is no guarding or rebound.   Musculoskeletal:         General: No swelling or tenderness.      Right lower leg: No edema.      Left lower leg: No edema.   Skin:     General: Skin is warm and dry.   Neurological:      General: No focal deficit present.      Mental Status: She is alert and oriented to person, place, and time. Mental status is at baseline.   Psychiatric:         Mood and Affect: Mood normal.         Behavior: Behavior normal.         Thought Content: Thought content normal.         Judgment: Judgment normal.     Labs reviewed as follows:  Lab Results   Component Value Date    BNP 31 04/03/2024     04/03/2024     11/01/2019    K 3.7 04/03/2024    K 4.5 11/01/2019    MG 2.0 04/03/2024     04/03/2024    CL 24 10/10/2019    CO2 22 (L) 04/03/2024    PHOS 4.1 06/03/2020    BUN 20 04/03/2024    BUN 20 11/01/2019    CREATININE 1.1 04/03/2024    CREATININE 1.4 11/01/2019     04/03/2024    HGBA1C 5.8 (H) 01/31/2024    AST 20 04/03/2024    ALT 23 04/03/2024    ALBUMIN 3.7 04/03/2024    PROT 6.9 04/03/2024    BILITOT 0.4 04/03/2024    WBC 5.19 04/03/2024    HGB 13.1 04/03/2024    HCT 41.5 04/03/2024    HCT 24 (L) 12/18/2019     04/03/2024    INR 1.2 12/23/2019    INR 3.4 11/11/2019     12/16/2019    TSH 1.980 01/31/2024    I3ALCQC 8.6 11/30/2020    FREET4 1.01 01/31/2024    CHOL 138 01/31/2024    HDL 50 01/31/2024    LDLCALC 61.4 (L) 01/31/2024    TRIG 133 01/31/2024    TACROLIMUS 4.0 (L) 08/21/2023     Lab Results    Component Value Date    BNP 31 04/03/2024    BNP 26 01/31/2024    BNP 50 06/21/2023 04/03/2024 EKG I have personally reviewed the tracing sinus rhythm no evidence of conduction disturbance nonspecific ST-T abnormality    04/03/2024 echo--I personally reviewed the images left ventricular systolic function is clearly normal, there has no focal areas of thinning of the myocardium and nothing to suggest cardiac sarcoidosis    Post cardiac transplantation study (OHTx 12/16/19)    Left Ventricle: The left ventricle is normal in size. Normal wall thickness. Normal wall motion. There is normal systolic function with a visually estimated ejection fraction of 60 - 65%. There is normal diastolic function.    Right Ventricle: Normal right ventricular cavity size. Wall thickness is normal. Right ventricle wall motion  is normal. Systolic function is normal.    Pulmonary Artery: The estimated pulmonary artery systolic pressure is 15 mmHg.    IVC/SVC: Normal venous pressure at 3 mmHg.    03/14/2024 cardiac PET FDG    The study is positive with focal FDG uptake in the base to mid anterior, septal, lateral, and inferolateral heart.  This is suggestive of active cardiac sarcoid in the appropriate clinical context.    Resting perfusion is largely normal, suggestive of no significant myocardial scar.    01/31/2024 stress echocardiogram    The patient is s/p heart transplant 2019. Atrial dimensions are not reported. .    Stress Protocol: The patient exercised for 5 minutes 15 seconds on a high ramp protocol, corresponding to a functional capacity of 8 METS, achieving a peak heart rate of 155 bpm, which is 98 % of the age predicted maximum heart rate. Their exercise capacity was normal. The patient reported no symptoms during the stress test. The test was stopped because the patient experienced fatigue.    ECG Conclusion: The ECG portion of the study is negative for ischemia.    Left Ventricle: The left ventricle is normal in size.  Normal wall thickness. Normal wall motion. There is normal systolic function. Ejection fraction by visual approximation is 60%. There is normal diastolic function. Average E/e' ratio is 6.8.    Right Ventricle: Normal right ventricular cavity size. Wall thickness is normal. Right ventricle wall motion  is normal. Systolic function is normal.    Pulmonary Artery: The estimated pulmonary artery systolic pressure is 29 mmHg.    Baseline ECG: The Baseline ECG reveals sinus tachycardia. The axis is normal. The ST segments are normal.    Post-stress Impression: The study is normal and negative with no echocardiographic evidence of stress induced ischemia.     06/19/2023 echo  The left ventricle is normal in size with normal systolic function.  Normal left ventricular diastolic function.  The estimated PA systolic pressure is 33 mmHg.  Normal right ventricular size with normal right ventricular systolic function.  Normal central venous pressure (3 mmHg).  The estimated ejection fraction is 60%.    03/06/2023 EKG sinus rhythm nonspecific ST-T abnormality    December 22, 2022 echocardiogram  The patient is status post cardiac transplantation.  The left ventricle is normal in size with normal systolic function.  The estimated ejection fraction is 65%.  Normal left ventricular diastolic function.  Normal right ventricular size with normal right ventricular systolic function.  The estimated PA systolic pressure is 26 mmHg.  Normal central venous pressure (3 mmHg).     March 18, 2022 coronary angiogram  Normal coronary arteries by angiography  Eccentric plaque as detailed below noted on IVUs or LM, proximal LAD, and mid LAD    2/17/2022 Cardiac PET FDG    The study is normal and does not show evidence of active or remote cardiac sarcoid.    When compared to the prior study from 9-, there are no signficant changes.    LVEF is 73% and is without wall motion abnormalities.    9/15/2020 Cardiac PET FDG    Perfusion is normal.  There are no resting defects.    This study is negative for active cardiac sarcoidosis.              Assessment:     1. S/P orthotopic heart transplant    2. 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 Immunodeficiency due to treatment with immunosuppressive medication    4. Abnormal positron emission tomography (PET) scan        Plan:   Will obtain cardiac MRI as nothing to suggest cardiac sarcoidosis with normal EKG and ECHO thus specificity of the findings noted is questionable.  At this point I am not inclined to intensify her immunosuppression as it is not clear to me that the risk benefit ratio favors that approach.  A cardiac MRI will be helpful regarding the possibility of sarcoidosis and serve as a baseline if questions arise in the future.      There is no great data to guide the follow-up for cardiac sarcoidosis in patients post heart transplant.  I am altering my protocol to no longer recommend routine PET FDG studies and patient's post heart transplant with prior cardiac sarcoidosis.  I will no longer recommend performance of a cardiac PET FDG in the absence of a change on the EKG or echocardiogram suggesting sarcoidosis.    Return instructions as set forth by post transplant schedule or as needed:    Clinic: Return for labs and/or biopsy weekly the first month, every two weeks during month 2 and then monthly for the first year at the provider or coordinator's discretion. During the second year, return to clinic every 3 months. Post transplant year 3-5 return every 6 months. There will be a comprehensive post transplant evaluation every year that may include LHC/RHC/biopsy, stress test, echo, CXR, and other health screening exams.    In addition to the clinical assessment, I have ordered Allomap testing for this patient to assist in identification of moderate/severe acute cellular rejection (ACR) in a pt with stable Allograft function instead of endomyocardial biopsy.      Patient is reminded to call with any health changes as these can be early signs of transplant complications. Patient is advised to make sure any new medications or changes of old medications are discussed with a pharmacist or physician knowledgeable with transplant to avoid rejection/drug toxicity related to significant drug interactions.    Patient advised that it is recommended that all transplanted patients, and their close contacts and household members receive Covid vaccination.    UNOS Patient Status  Functional Status: 100% - Normal, no complaints, no evidence of disease  Physical Capacity: No Limitations    Eric Antunez Jr, MD

## 2024-04-05 ENCOUNTER — TELEPHONE (OUTPATIENT)
Dept: TRANSPLANT | Facility: CLINIC | Age: 54
End: 2024-04-05
Payer: COMMERCIAL

## 2024-04-05 DIAGNOSIS — Z79.899 ENCOUNTER FOR LONG-TERM (CURRENT) USE OF MEDICATIONS: ICD-10-CM

## 2024-04-05 DIAGNOSIS — Z94.1 STATUS POST HEART TRANSPLANT: ICD-10-CM

## 2024-04-05 DIAGNOSIS — Z79.52 LONG TERM CURRENT USE OF SYSTEMIC STEROIDS: ICD-10-CM

## 2024-04-05 DIAGNOSIS — T86.20 COMPLICATION OF HEART TRANSPLANT, UNSPECIFIED COMPLICATION: Primary | ICD-10-CM

## 2024-04-05 DIAGNOSIS — R06.02 SHORTNESS OF BREATH: ICD-10-CM

## 2024-04-05 NOTE — ASSESSMENT & PLAN NOTE
Titrate insulin slowly to avoid hypoglycemia as the risk of hypoglycemia increases with decreased creatinine clearance.       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

## 2024-04-05 NOTE — TELEPHONE ENCOUNTER
Spoke with pt about her sirolimus level of 16, pt stated that she had taken medication the day before late in the day, [facility]

## 2024-04-09 ENCOUNTER — PATIENT MESSAGE (OUTPATIENT)
Dept: TRANSPLANT | Facility: CLINIC | Age: 54
End: 2024-04-09
Payer: COMMERCIAL

## 2024-04-12 ENCOUNTER — LAB VISIT (OUTPATIENT)
Dept: LAB | Facility: HOSPITAL | Age: 54
End: 2024-04-12
Attending: INTERNAL MEDICINE
Payer: COMMERCIAL

## 2024-04-12 DIAGNOSIS — R06.02 SHORTNESS OF BREATH: ICD-10-CM

## 2024-04-12 DIAGNOSIS — Z94.1 STATUS POST HEART TRANSPLANT: ICD-10-CM

## 2024-04-12 DIAGNOSIS — Z79.899 ENCOUNTER FOR LONG-TERM (CURRENT) USE OF MEDICATIONS: ICD-10-CM

## 2024-04-12 DIAGNOSIS — Z79.52 LONG TERM CURRENT USE OF SYSTEMIC STEROIDS: ICD-10-CM

## 2024-04-12 DIAGNOSIS — T86.20 COMPLICATION OF HEART TRANSPLANT, UNSPECIFIED COMPLICATION: ICD-10-CM

## 2024-04-12 LAB
ALBUMIN SERPL BCP-MCNC: 3.9 G/DL (ref 3.5–5.2)
ALP SERPL-CCNC: 84 U/L (ref 55–135)
ALT SERPL W/O P-5'-P-CCNC: 29 U/L (ref 10–44)
ANION GAP SERPL CALC-SCNC: 10 MMOL/L (ref 8–16)
AST SERPL-CCNC: 22 U/L (ref 10–40)
BASOPHILS # BLD AUTO: 0.05 K/UL (ref 0–0.2)
BASOPHILS NFR BLD: 0.9 % (ref 0–1.9)
BILIRUB SERPL-MCNC: 0.4 MG/DL (ref 0.1–1)
BNP SERPL-MCNC: 26 PG/ML (ref 0–99)
BUN SERPL-MCNC: 22 MG/DL (ref 6–20)
CALCIUM SERPL-MCNC: 10.3 MG/DL (ref 8.7–10.5)
CHLORIDE SERPL-SCNC: 107 MMOL/L (ref 95–110)
CHOLEST SERPL-MCNC: 195 MG/DL (ref 120–199)
CHOLEST/HDLC SERPL: 3.5 {RATIO} (ref 2–5)
CO2 SERPL-SCNC: 26 MMOL/L (ref 23–29)
CREAT SERPL-MCNC: 1.1 MG/DL (ref 0.5–1.4)
DIFFERENTIAL METHOD BLD: ABNORMAL
EOSINOPHIL # BLD AUTO: 0.1 K/UL (ref 0–0.5)
EOSINOPHIL NFR BLD: 2.6 % (ref 0–8)
ERYTHROCYTE [DISTWIDTH] IN BLOOD BY AUTOMATED COUNT: 13.2 % (ref 11.5–14.5)
EST. GFR  (NO RACE VARIABLE): 59.7 ML/MIN/1.73 M^2
GLUCOSE SERPL-MCNC: 124 MG/DL (ref 70–110)
HCT VFR BLD AUTO: 45.2 % (ref 37–48.5)
HDLC SERPL-MCNC: 56 MG/DL (ref 40–75)
HDLC SERPL: 28.7 % (ref 20–50)
HGB BLD-MCNC: 13.9 G/DL (ref 12–16)
IMM GRANULOCYTES # BLD AUTO: 0.03 K/UL (ref 0–0.04)
IMM GRANULOCYTES NFR BLD AUTO: 0.6 % (ref 0–0.5)
LDLC SERPL CALC-MCNC: 89.8 MG/DL (ref 63–159)
LYMPHOCYTES # BLD AUTO: 1.4 K/UL (ref 1–4.8)
LYMPHOCYTES NFR BLD: 26.1 % (ref 18–48)
MAGNESIUM SERPL-MCNC: 2.4 MG/DL (ref 1.6–2.6)
MCH RBC QN AUTO: 26.2 PG (ref 27–31)
MCHC RBC AUTO-ENTMCNC: 30.8 G/DL (ref 32–36)
MCV RBC AUTO: 85 FL (ref 82–98)
MONOCYTES # BLD AUTO: 0.5 K/UL (ref 0.3–1)
MONOCYTES NFR BLD: 9.5 % (ref 4–15)
NEUTROPHILS # BLD AUTO: 3.3 K/UL (ref 1.8–7.7)
NEUTROPHILS NFR BLD: 60.3 % (ref 38–73)
NONHDLC SERPL-MCNC: 139 MG/DL
NRBC BLD-RTO: 0 /100 WBC
PLATELET # BLD AUTO: 331 K/UL (ref 150–450)
PMV BLD AUTO: 9.8 FL (ref 9.2–12.9)
POTASSIUM SERPL-SCNC: 4.8 MMOL/L (ref 3.5–5.1)
PROT SERPL-MCNC: 6.7 G/DL (ref 6–8.4)
RBC # BLD AUTO: 5.31 M/UL (ref 4–5.4)
SODIUM SERPL-SCNC: 143 MMOL/L (ref 136–145)
TRIGL SERPL-MCNC: 246 MG/DL (ref 30–150)
WBC # BLD AUTO: 5.45 K/UL (ref 3.9–12.7)

## 2024-04-12 PROCEDURE — 36415 COLL VENOUS BLD VENIPUNCTURE: CPT | Performed by: INTERNAL MEDICINE

## 2024-04-12 PROCEDURE — 85025 COMPLETE CBC W/AUTO DIFF WBC: CPT | Performed by: INTERNAL MEDICINE

## 2024-04-12 PROCEDURE — 80053 COMPREHEN METABOLIC PANEL: CPT | Performed by: INTERNAL MEDICINE

## 2024-04-12 PROCEDURE — 80195 ASSAY OF SIROLIMUS: CPT | Performed by: INTERNAL MEDICINE

## 2024-04-12 PROCEDURE — 83880 ASSAY OF NATRIURETIC PEPTIDE: CPT | Performed by: INTERNAL MEDICINE

## 2024-04-12 PROCEDURE — 83735 ASSAY OF MAGNESIUM: CPT | Performed by: INTERNAL MEDICINE

## 2024-04-12 PROCEDURE — 80061 LIPID PANEL: CPT | Performed by: INTERNAL MEDICINE

## 2024-04-13 LAB — SIROLIMUS BLD-MCNC: 10.9 NG/ML (ref 4–20)

## 2024-04-23 ENCOUNTER — TELEPHONE (OUTPATIENT)
Dept: CARDIOLOGY | Facility: HOSPITAL | Age: 54
End: 2024-04-23
Payer: COMMERCIAL

## 2024-04-23 ENCOUNTER — PATIENT MESSAGE (OUTPATIENT)
Dept: CARDIOLOGY | Facility: HOSPITAL | Age: 54
End: 2024-04-23
Payer: COMMERCIAL

## 2024-04-23 NOTE — TELEPHONE ENCOUNTER
I had the pleasure of discussing your upcoming Cardiac MRI scheduled for 04/25/2024 @ 10:00 at [facility] at [address] [address] [address] from the Main [facility].     We discussed and ensured that you will arrive for the scheduled appointment and confirmed the absence of a pacemaker/defibrillator, the absence of a cerebral aneurysm or surgical clip, pump, nerve or brain stimulator, middle or inner ear prosthesis or other metal implant or being injured by a metal object (i.e. bullet, bb, shrapnel).     Thank you again for your time today. I can be reached at [phone].

## 2024-04-25 ENCOUNTER — HOSPITAL ENCOUNTER (OUTPATIENT)
Dept: RADIOLOGY | Facility: HOSPITAL | Age: 54
Discharge: HOME | End: 2024-04-25
Attending: INTERNAL MEDICINE
Payer: COMMERCIAL

## 2024-04-25 DIAGNOSIS — R94.8 ABNORMAL POSITRON EMISSION TOMOGRAPHY (PET) SCAN: ICD-10-CM

## 2024-04-25 DIAGNOSIS — Z94.1 S/P ORTHOTOPIC HEART TRANSPLANT: ICD-10-CM

## 2024-04-25 PROCEDURE — 75561 CARDIAC MRI FOR MORPH W/DYE: CPT | Mod: 26,,, | Performed by: INTERNAL MEDICINE

## 2024-04-25 PROCEDURE — 25500020 PHARM REV CODE 255: Performed by: INTERNAL MEDICINE

## 2024-04-25 PROCEDURE — 75561 CARDIAC MRI FOR MORPH W/DYE: CPT | Mod: TC

## 2024-04-25 PROCEDURE — 75561 CARDIAC MRI FOR MORPH W/DYE: CPT | Mod: 26,,, | Performed by: STUDENT IN AN ORGANIZED HEALTH CARE EDUCATION/TRAINING PROGRAM

## 2024-04-25 PROCEDURE — A9585 GADOBUTROL INJECTION: HCPCS | Performed by: INTERNAL MEDICINE

## 2024-04-25 RX ORDER — GADOBUTROL 604.72 MG/ML
16 INJECTION INTRAVENOUS
Status: COMPLETED | OUTPATIENT
Start: 2024-04-25 | End: 2024-04-25

## 2024-04-25 RX ADMIN — GADOBUTROL 16 ML: 604.72 INJECTION INTRAVENOUS at 10:04

## 2024-05-22 NOTE — PLAN OF CARE
Con't POC.    JAGJIT Jensen     [Never (0 pts)] : Never (0 points) [No] : In the past 12 months have you used drugs other than those required for medical reasons? No [No falls in past year] : Patient reported no falls in the past year [Little interest or pleasure doing things] : 1) Little interest or pleasure doing things [Feeling down, depressed, or hopeless] : 2) Feeling down, depressed, or hopeless [0] : 2) Feeling down, depressed, or hopeless: Not at all (0) [PHQ-2 Negative - No further assessment needed] : PHQ-2 Negative - No further assessment needed [Never] : Never [[id number]] : 0

## 2024-05-23 ENCOUNTER — TELEPHONE (OUTPATIENT)
Dept: TRANSPLANT | Facility: CLINIC | Age: 54
End: 2024-05-23
Payer: COMMERCIAL

## 2024-05-23 NOTE — TELEPHONE ENCOUNTER
----- Message from Eric Antunez Jr., MD sent at 5/23/2024  3:02 PM CDT -----   No evidence of cardiac sarcoidosis on this baseline study with the patient post heart transplant. I left a message on the patient's mobile phone number, [phone].

## 2024-06-05 ENCOUNTER — PATIENT MESSAGE (OUTPATIENT)
Dept: TRANSPLANT | Facility: CLINIC | Age: 54
End: 2024-06-05
Payer: COMMERCIAL

## 2024-07-15 ENCOUNTER — PATIENT MESSAGE (OUTPATIENT)
Dept: TRANSPLANT | Facility: CLINIC | Age: 54
End: 2024-07-15
Payer: COMMERCIAL

## 2024-07-22 DIAGNOSIS — Z94.9 HYPERTENSION ASSOCIATED WITH TRANSPLANTATION: ICD-10-CM

## 2024-07-22 DIAGNOSIS — I15.8 HYPERTENSION ASSOCIATED WITH TRANSPLANTATION: ICD-10-CM

## 2024-07-23 RX ORDER — CANDESARTAN 8 MG/1
8 TABLET ORAL
Qty: 90 TABLET | Refills: 3 | Status: SHIPPED | OUTPATIENT
Start: 2024-07-23

## 2024-07-25 ENCOUNTER — LAB VISIT (OUTPATIENT)
Dept: LAB | Facility: HOSPITAL | Age: 54
End: 2024-07-25
Attending: INTERNAL MEDICINE
Payer: COMMERCIAL

## 2024-07-25 DIAGNOSIS — Z94.1 STATUS POST HEART TRANSPLANT: ICD-10-CM

## 2024-07-25 DIAGNOSIS — R06.02 SHORTNESS OF BREATH: ICD-10-CM

## 2024-07-25 DIAGNOSIS — Z79.899 ENCOUNTER FOR LONG-TERM (CURRENT) USE OF MEDICATIONS: ICD-10-CM

## 2024-07-25 DIAGNOSIS — T86.20 COMPLICATION OF HEART TRANSPLANT, UNSPECIFIED COMPLICATION: ICD-10-CM

## 2024-07-25 DIAGNOSIS — Z79.52 LONG TERM CURRENT USE OF SYSTEMIC STEROIDS: ICD-10-CM

## 2024-07-25 LAB
ALBUMIN SERPL BCP-MCNC: 3.6 G/DL (ref 3.5–5.2)
ALP SERPL-CCNC: 69 U/L (ref 55–135)
ALT SERPL W/O P-5'-P-CCNC: 35 U/L (ref 10–44)
ANION GAP SERPL CALC-SCNC: 11 MMOL/L (ref 8–16)
AST SERPL-CCNC: 28 U/L (ref 10–40)
BASOPHILS # BLD AUTO: 0.06 K/UL (ref 0–0.2)
BASOPHILS NFR BLD: 0.9 % (ref 0–1.9)
BILIRUB SERPL-MCNC: 0.3 MG/DL (ref 0.1–1)
BNP SERPL-MCNC: 51 PG/ML (ref 0–99)
BUN SERPL-MCNC: 25 MG/DL (ref 6–20)
CALCIUM SERPL-MCNC: 9.2 MG/DL (ref 8.7–10.5)
CHLORIDE SERPL-SCNC: 106 MMOL/L (ref 95–110)
CHOLEST SERPL-MCNC: 176 MG/DL (ref 120–199)
CHOLEST/HDLC SERPL: 3 {RATIO} (ref 2–5)
CO2 SERPL-SCNC: 24 MMOL/L (ref 23–29)
CREAT SERPL-MCNC: 1.1 MG/DL (ref 0.5–1.4)
DIFFERENTIAL METHOD BLD: ABNORMAL
EOSINOPHIL # BLD AUTO: 0.2 K/UL (ref 0–0.5)
EOSINOPHIL NFR BLD: 2.6 % (ref 0–8)
ERYTHROCYTE [DISTWIDTH] IN BLOOD BY AUTOMATED COUNT: 13.8 % (ref 11.5–14.5)
EST. GFR  (NO RACE VARIABLE): 59.7 ML/MIN/1.73 M^2
GLUCOSE SERPL-MCNC: 121 MG/DL (ref 70–110)
HCT VFR BLD AUTO: 43.4 % (ref 37–48.5)
HDLC SERPL-MCNC: 58 MG/DL (ref 40–75)
HDLC SERPL: 33 % (ref 20–50)
HGB BLD-MCNC: 12.9 G/DL (ref 12–16)
IMM GRANULOCYTES # BLD AUTO: 0.03 K/UL (ref 0–0.04)
IMM GRANULOCYTES NFR BLD AUTO: 0.5 % (ref 0–0.5)
LDLC SERPL CALC-MCNC: 59.4 MG/DL (ref 63–159)
LYMPHOCYTES # BLD AUTO: 1.8 K/UL (ref 1–4.8)
LYMPHOCYTES NFR BLD: 26.8 % (ref 18–48)
MAGNESIUM SERPL-MCNC: 2.3 MG/DL (ref 1.6–2.6)
MCH RBC QN AUTO: 24.9 PG (ref 27–31)
MCHC RBC AUTO-ENTMCNC: 29.7 G/DL (ref 32–36)
MCV RBC AUTO: 84 FL (ref 82–98)
MONOCYTES # BLD AUTO: 0.8 K/UL (ref 0.3–1)
MONOCYTES NFR BLD: 11.4 % (ref 4–15)
NEUTROPHILS # BLD AUTO: 3.8 K/UL (ref 1.8–7.7)
NEUTROPHILS NFR BLD: 57.8 % (ref 38–73)
NONHDLC SERPL-MCNC: 118 MG/DL
NRBC BLD-RTO: 0 /100 WBC
PLATELET # BLD AUTO: 333 K/UL (ref 150–450)
PMV BLD AUTO: 9.8 FL (ref 9.2–12.9)
POTASSIUM SERPL-SCNC: 3.8 MMOL/L (ref 3.5–5.1)
PROT SERPL-MCNC: 6.6 G/DL (ref 6–8.4)
RBC # BLD AUTO: 5.18 M/UL (ref 4–5.4)
SODIUM SERPL-SCNC: 141 MMOL/L (ref 136–145)
TRIGL SERPL-MCNC: 293 MG/DL (ref 30–150)
WBC # BLD AUTO: 6.57 K/UL (ref 3.9–12.7)

## 2024-07-25 PROCEDURE — 80053 COMPREHEN METABOLIC PANEL: CPT | Performed by: INTERNAL MEDICINE

## 2024-07-25 PROCEDURE — 83735 ASSAY OF MAGNESIUM: CPT | Performed by: INTERNAL MEDICINE

## 2024-07-25 PROCEDURE — 83880 ASSAY OF NATRIURETIC PEPTIDE: CPT | Performed by: INTERNAL MEDICINE

## 2024-07-25 PROCEDURE — 85025 COMPLETE CBC W/AUTO DIFF WBC: CPT | Performed by: INTERNAL MEDICINE

## 2024-07-25 PROCEDURE — 36415 COLL VENOUS BLD VENIPUNCTURE: CPT | Performed by: INTERNAL MEDICINE

## 2024-07-25 PROCEDURE — 80195 ASSAY OF SIROLIMUS: CPT | Performed by: INTERNAL MEDICINE

## 2024-07-25 PROCEDURE — 80061 LIPID PANEL: CPT | Performed by: INTERNAL MEDICINE

## 2024-07-26 LAB — SIROLIMUS BLD-MCNC: 12.6 NG/ML (ref 4–20)

## 2024-09-06 DIAGNOSIS — Z94.1 HEART TRANSPLANTED: ICD-10-CM

## 2024-09-06 RX ORDER — SIROLIMUS 1 MG/1
3 TABLET, FILM COATED ORAL
Qty: 90 TABLET | Refills: 11 | Status: SHIPPED | OUTPATIENT
Start: 2024-09-06

## 2024-09-12 ENCOUNTER — PATIENT MESSAGE (OUTPATIENT)
Dept: RHEUMATOLOGY | Facility: CLINIC | Age: 54
End: 2024-09-12
Payer: COMMERCIAL

## 2024-09-18 ENCOUNTER — TELEPHONE (OUTPATIENT)
Dept: TRANSPLANT | Facility: CLINIC | Age: 54
End: 2024-09-18
Payer: COMMERCIAL

## 2024-09-18 DIAGNOSIS — E03.2 HYPOTHYROIDISM DUE TO MEDICATION: ICD-10-CM

## 2024-09-18 DIAGNOSIS — E78.2 MIXED HYPERLIPIDEMIA: ICD-10-CM

## 2024-09-18 DIAGNOSIS — Z94.1 STATUS POST HEART TRANSPLANT: Primary | ICD-10-CM

## 2024-09-18 DIAGNOSIS — Z13.1 SCREENING FOR DIABETES MELLITUS: ICD-10-CM

## 2024-09-18 DIAGNOSIS — Z79.899 ENCOUNTER FOR LONG-TERM (CURRENT) USE OF MEDICATIONS: ICD-10-CM

## 2024-09-18 NOTE — TELEPHONE ENCOUNTER
Letter sent to patient regarding tests that will be needed for patient's annual post transplant visit.

## 2024-09-18 NOTE — LETTER
September 18, 2024    Meryl Navas  [address]  [address]  [facility] [address]    Dear Meryl Navas:    We recommend you complete the following on an annual basis.  Our patients need to have a primary care physician near home.    For your 5 year annual visit, you will need the following tests completed:    Annual visit with the transplant cardiologist and transplant coordinator  Angiogram (left heart cath)  Echo  Chest x-ray within the last year  Fasting labs: CBC, CMP, lipid panel, magnesium, medication levels, mammogram and PAP for females.  See Dermatology annually for a full body skin exam  See your Dentist twice a year  Get a flu shot annually  See your PCP for recommendations on colonoscopy and other preventative health measures    We will reach out to you to schedule the above appointments. Please let me know if you have any further questions.  My direct number is [phone].    Sincerely,        Niya Parnell  Heart Transplant Coordinator  [address] [place] [address] - [address] [place] [address]  [phone]

## 2024-09-19 ENCOUNTER — TELEPHONE (OUTPATIENT)
Dept: TRANSPLANT | Facility: CLINIC | Age: 54
End: 2024-09-19
Payer: COMMERCIAL

## 2024-09-19 NOTE — TELEPHONE ENCOUNTER
Called patient, left message to schedule routine post transplant follow-up labs. Coordinator's call back number provided.

## 2024-09-26 DIAGNOSIS — Z94.9 HYPERTENSION ASSOCIATED WITH TRANSPLANTATION: ICD-10-CM

## 2024-09-26 DIAGNOSIS — I15.8 HYPERTENSION ASSOCIATED WITH TRANSPLANTATION: ICD-10-CM

## 2024-09-26 RX ORDER — CANDESARTAN 8 MG/1
8 TABLET ORAL
Qty: 90 TABLET | Refills: 3 | Status: SHIPPED | OUTPATIENT
Start: 2024-09-26

## 2024-09-27 ENCOUNTER — TELEPHONE (OUTPATIENT)
Dept: TRANSPLANT | Facility: CLINIC | Age: 54
End: 2024-09-27
Payer: COMMERCIAL

## 2024-09-27 NOTE — TELEPHONE ENCOUNTER
Called patient to schedule routine labs and annual post transplant follow-up. No answer, message left with coordinator's call back number.

## 2024-10-02 ENCOUNTER — TELEPHONE (OUTPATIENT)
Dept: TRANSPLANT | Facility: CLINIC | Age: 54
End: 2024-10-02
Payer: COMMERCIAL

## 2024-10-02 NOTE — TELEPHONE ENCOUNTER
Pt called and stated that she will be out of town until Jan and would like to get her annual completed in Jan, She is scheduled for Fasting labs on 10/7/24.

## 2024-10-03 RX ORDER — POTASSIUM CHLORIDE 1500 MG/1
20 TABLET, EXTENDED RELEASE ORAL DAILY
Qty: 90 TABLET | Refills: 3 | Status: SHIPPED | OUTPATIENT
Start: 2024-10-03

## 2024-10-14 ENCOUNTER — LAB VISIT (OUTPATIENT)
Dept: LAB | Facility: HOSPITAL | Age: 54
End: 2024-10-14
Attending: INTERNAL MEDICINE
Payer: COMMERCIAL

## 2024-10-14 DIAGNOSIS — Z94.1 STATUS POST HEART TRANSPLANT: ICD-10-CM

## 2024-10-14 DIAGNOSIS — Z79.899 ENCOUNTER FOR LONG-TERM (CURRENT) USE OF MEDICATIONS: ICD-10-CM

## 2024-10-14 LAB
ALBUMIN SERPL BCP-MCNC: 3.7 G/DL (ref 3.5–5.2)
ALP SERPL-CCNC: 71 U/L (ref 55–135)
ALT SERPL W/O P-5'-P-CCNC: 44 U/L (ref 10–44)
ANION GAP SERPL CALC-SCNC: 13 MMOL/L (ref 8–16)
AST SERPL-CCNC: 39 U/L (ref 10–40)
BASOPHILS # BLD AUTO: 0.08 K/UL (ref 0–0.2)
BASOPHILS NFR BLD: 1.3 % (ref 0–1.9)
BILIRUB SERPL-MCNC: 0.3 MG/DL (ref 0.1–1)
BNP SERPL-MCNC: 39 PG/ML (ref 0–99)
BUN SERPL-MCNC: 16 MG/DL (ref 6–20)
CALCIUM SERPL-MCNC: 9.8 MG/DL (ref 8.7–10.5)
CHLORIDE SERPL-SCNC: 104 MMOL/L (ref 95–110)
CO2 SERPL-SCNC: 23 MMOL/L (ref 23–29)
CREAT SERPL-MCNC: 1.2 MG/DL (ref 0.5–1.4)
DIFFERENTIAL METHOD BLD: ABNORMAL
EOSINOPHIL # BLD AUTO: 0.2 K/UL (ref 0–0.5)
EOSINOPHIL NFR BLD: 2.9 % (ref 0–8)
ERYTHROCYTE [DISTWIDTH] IN BLOOD BY AUTOMATED COUNT: 13.5 % (ref 11.5–14.5)
EST. GFR  (NO RACE VARIABLE): 53.5 ML/MIN/1.73 M^2
GLUCOSE SERPL-MCNC: 131 MG/DL (ref 70–110)
HCT VFR BLD AUTO: 41.8 % (ref 37–48.5)
HGB BLD-MCNC: 13 G/DL (ref 12–16)
IMM GRANULOCYTES # BLD AUTO: 0.03 K/UL (ref 0–0.04)
IMM GRANULOCYTES NFR BLD AUTO: 0.5 % (ref 0–0.5)
LYMPHOCYTES # BLD AUTO: 1.3 K/UL (ref 1–4.8)
LYMPHOCYTES NFR BLD: 20.2 % (ref 18–48)
MAGNESIUM SERPL-MCNC: 2 MG/DL (ref 1.6–2.6)
MCH RBC QN AUTO: 25.1 PG (ref 27–31)
MCHC RBC AUTO-ENTMCNC: 31.1 G/DL (ref 32–36)
MCV RBC AUTO: 81 FL (ref 82–98)
MONOCYTES # BLD AUTO: 0.6 K/UL (ref 0.3–1)
MONOCYTES NFR BLD: 10.2 % (ref 4–15)
NEUTROPHILS # BLD AUTO: 4.1 K/UL (ref 1.8–7.7)
NEUTROPHILS NFR BLD: 64.9 % (ref 38–73)
NRBC BLD-RTO: 0 /100 WBC
PLATELET # BLD AUTO: 331 K/UL (ref 150–450)
PMV BLD AUTO: 9.7 FL (ref 9.2–12.9)
POTASSIUM SERPL-SCNC: 4.1 MMOL/L (ref 3.5–5.1)
PROT SERPL-MCNC: 6.9 G/DL (ref 6–8.4)
RBC # BLD AUTO: 5.18 M/UL (ref 4–5.4)
SODIUM SERPL-SCNC: 140 MMOL/L (ref 136–145)
WBC # BLD AUTO: 6.28 K/UL (ref 3.9–12.7)

## 2024-10-14 PROCEDURE — 80053 COMPREHEN METABOLIC PANEL: CPT | Performed by: INTERNAL MEDICINE

## 2024-10-14 PROCEDURE — 85025 COMPLETE CBC W/AUTO DIFF WBC: CPT | Performed by: INTERNAL MEDICINE

## 2024-10-14 PROCEDURE — 80195 ASSAY OF SIROLIMUS: CPT | Performed by: INTERNAL MEDICINE

## 2024-10-14 PROCEDURE — 83735 ASSAY OF MAGNESIUM: CPT | Performed by: INTERNAL MEDICINE

## 2024-10-14 PROCEDURE — 36415 COLL VENOUS BLD VENIPUNCTURE: CPT | Performed by: INTERNAL MEDICINE

## 2024-10-14 PROCEDURE — 83880 ASSAY OF NATRIURETIC PEPTIDE: CPT | Performed by: INTERNAL MEDICINE

## 2024-10-15 ENCOUNTER — TELEPHONE (OUTPATIENT)
Dept: TRANSPLANT | Facility: CLINIC | Age: 54
End: 2024-10-15
Payer: COMMERCIAL

## 2024-10-15 LAB — SIROLIMUS BLD-MCNC: 20 NG/ML (ref 4–20)

## 2024-10-15 NOTE — TELEPHONE ENCOUNTER
Called patient to review recent labs. Sirolimus level noted to be 20, with goal of 5-10. Left message with coordinator's call back number to discuss medication timing in relation to lab draw.    Patient returned phone call. Stated she took her last dose of sirolimus at 9:30 am 10/13/24 the day prior to lab draw. Labs were drawn at 10 am on 10/14/24. Informed patient that I will discuss with MD and call her back with recommendations. Patient verbalized understanding.

## 2024-10-16 ENCOUNTER — TELEPHONE (OUTPATIENT)
Dept: TRANSPLANT | Facility: CLINIC | Age: 54
End: 2024-10-16
Payer: COMMERCIAL

## 2024-10-16 NOTE — TELEPHONE ENCOUNTER
Called patient regarding sirolimus lab. Sirolimus level is 20, goal 5-10. Patient takes Sirolimus 3 mg daily. Per Dr. Chaudhari, ask patient to hold sirolimus medication and repeat lab.    Patient states she has not taken sirolimus yet today. Instructed patient to not take sirolimus today and we will re-check labs tomorrow. Fasting lab appt made at 9am on Thursday 10/17/24 at [facility]. Patient verbalized acknowledgment and agrees with plan. Will call patient once re-peat labs have resulted with further instructions.

## 2024-10-17 ENCOUNTER — LAB VISIT (OUTPATIENT)
Dept: LAB | Facility: HOSPITAL | Age: 54
End: 2024-10-17
Attending: INTERNAL MEDICINE
Payer: COMMERCIAL

## 2024-10-17 DIAGNOSIS — Z94.1 STATUS POST HEART TRANSPLANT: ICD-10-CM

## 2024-10-17 DIAGNOSIS — Z79.899 ENCOUNTER FOR LONG-TERM (CURRENT) USE OF MEDICATIONS: ICD-10-CM

## 2024-10-17 LAB
ALBUMIN SERPL BCP-MCNC: 3.9 G/DL (ref 3.5–5.2)
ALP SERPL-CCNC: 77 U/L (ref 40–150)
ALT SERPL W/O P-5'-P-CCNC: 49 U/L (ref 10–44)
ANION GAP SERPL CALC-SCNC: 15 MMOL/L (ref 8–16)
AST SERPL-CCNC: 45 U/L (ref 10–40)
BILIRUB SERPL-MCNC: 0.3 MG/DL (ref 0.1–1)
BUN SERPL-MCNC: 21 MG/DL (ref 6–20)
CALCIUM SERPL-MCNC: 9.7 MG/DL (ref 8.7–10.5)
CHLORIDE SERPL-SCNC: 105 MMOL/L (ref 95–110)
CO2 SERPL-SCNC: 23 MMOL/L (ref 23–29)
CREAT SERPL-MCNC: 1.1 MG/DL (ref 0.5–1.4)
EST. GFR  (NO RACE VARIABLE): 59.3 ML/MIN/1.73 M^2
GLUCOSE SERPL-MCNC: 142 MG/DL (ref 70–110)
POTASSIUM SERPL-SCNC: 3.9 MMOL/L (ref 3.5–5.1)
PROT SERPL-MCNC: 7.2 G/DL (ref 6–8.4)
SODIUM SERPL-SCNC: 143 MMOL/L (ref 136–145)

## 2024-10-17 PROCEDURE — 80195 ASSAY OF SIROLIMUS: CPT | Performed by: INTERNAL MEDICINE

## 2024-10-17 PROCEDURE — 80053 COMPREHEN METABOLIC PANEL: CPT | Performed by: INTERNAL MEDICINE

## 2024-10-17 PROCEDURE — 36415 COLL VENOUS BLD VENIPUNCTURE: CPT | Performed by: INTERNAL MEDICINE

## 2024-10-18 DIAGNOSIS — Z94.1 HEART TRANSPLANTED: ICD-10-CM

## 2024-10-18 LAB — SIROLIMUS BLD-MCNC: 13.5 NG/ML (ref 4–20)

## 2024-10-18 NOTE — TELEPHONE ENCOUNTER
Reviewed recent labs with Dr. Chaudhari. Sirolimus level is 13.5 (was previously 20, held a dose of sirolimus, repeated labs). Creatinine 1.1. Patient was taking 3 mg sirolimus daily. Per , reduce dose of sirolimus to 2 mg QD.    Reviewed labs with patient, explained rational for dose reduction. Patient verbalized understanding to take 2 mg sirolimus per day. Repeat lab appt made at [facility] for Monday October 21 at 9am. Patient with no further questions/ concerns at this time.

## 2024-10-21 ENCOUNTER — LAB VISIT (OUTPATIENT)
Dept: LAB | Facility: HOSPITAL | Age: 54
End: 2024-10-21
Attending: INTERNAL MEDICINE
Payer: COMMERCIAL

## 2024-10-21 DIAGNOSIS — Z79.899 ENCOUNTER FOR LONG-TERM (CURRENT) USE OF MEDICATIONS: ICD-10-CM

## 2024-10-21 DIAGNOSIS — Z94.1 STATUS POST HEART TRANSPLANT: ICD-10-CM

## 2024-10-21 LAB
ALBUMIN SERPL BCP-MCNC: 3.6 G/DL (ref 3.5–5.2)
ALP SERPL-CCNC: 73 U/L (ref 40–150)
ALT SERPL W/O P-5'-P-CCNC: 43 U/L (ref 10–44)
ANION GAP SERPL CALC-SCNC: 12 MMOL/L (ref 8–16)
AST SERPL-CCNC: 29 U/L (ref 10–40)
BILIRUB SERPL-MCNC: 0.2 MG/DL (ref 0.1–1)
BUN SERPL-MCNC: 19 MG/DL (ref 6–20)
CALCIUM SERPL-MCNC: 9.5 MG/DL (ref 8.7–10.5)
CHLORIDE SERPL-SCNC: 107 MMOL/L (ref 95–110)
CO2 SERPL-SCNC: 22 MMOL/L (ref 23–29)
CREAT SERPL-MCNC: 1.1 MG/DL (ref 0.5–1.4)
EST. GFR  (NO RACE VARIABLE): 59.3 ML/MIN/1.73 M^2
GLUCOSE SERPL-MCNC: 131 MG/DL (ref 70–110)
POTASSIUM SERPL-SCNC: 3.6 MMOL/L (ref 3.5–5.1)
PROT SERPL-MCNC: 6.6 G/DL (ref 6–8.4)
SODIUM SERPL-SCNC: 141 MMOL/L (ref 136–145)

## 2024-10-21 PROCEDURE — 80053 COMPREHEN METABOLIC PANEL: CPT | Performed by: INTERNAL MEDICINE

## 2024-10-21 PROCEDURE — 36415 COLL VENOUS BLD VENIPUNCTURE: CPT | Performed by: INTERNAL MEDICINE

## 2024-10-21 PROCEDURE — 80195 ASSAY OF SIROLIMUS: CPT | Performed by: INTERNAL MEDICINE

## 2024-10-22 LAB — SIROLIMUS BLD-MCNC: 9.5 NG/ML (ref 4–20)

## 2024-10-22 RX ORDER — SIROLIMUS 1 MG/1
2 TABLET, FILM COATED ORAL DAILY
Qty: 90 TABLET | Refills: 11 | Status: SHIPPED | OUTPATIENT
Start: 2024-10-22

## 2024-11-07 ENCOUNTER — PATIENT MESSAGE (OUTPATIENT)
Dept: TRANSPLANT | Facility: CLINIC | Age: 54
End: 2024-11-07
Payer: COMMERCIAL

## 2024-11-13 ENCOUNTER — TELEPHONE (OUTPATIENT)
Dept: TRANSPLANT | Facility: CLINIC | Age: 54
End: 2024-11-13
Payer: COMMERCIAL

## 2024-11-13 DIAGNOSIS — Z79.899 ENCOUNTER FOR LONG-TERM (CURRENT) USE OF MEDICATIONS: Primary | ICD-10-CM

## 2024-11-13 DIAGNOSIS — Z94.1 STATUS POST HEART TRANSPLANT: ICD-10-CM

## 2024-11-13 NOTE — TELEPHONE ENCOUNTER
Called patient to schedule annual post heart transplant follow-ups. Patient states she is available at the end of the January. January 27 available for labs, xray, echo and clinic visit. Patient agreeable. Informed patient that she is also due for [facility] this year and that the interventional cardiology department will reach out to her to schedule this. Patient verbalized understanding, no further questions at this time.

## 2024-11-14 DIAGNOSIS — Z94.1 HEART TRANSPLANTED: ICD-10-CM

## 2024-11-14 RX ORDER — SIROLIMUS 1 MG/1
2 TABLET, FILM COATED ORAL
Qty: 180 TABLET | Refills: 6 | Status: SHIPPED | OUTPATIENT
Start: 2024-11-14

## 2024-11-19 ENCOUNTER — TELEPHONE (OUTPATIENT)
Dept: CARDIOLOGY | Facility: CLINIC | Age: 54
End: 2024-11-19
Payer: COMMERCIAL

## 2024-11-19 NOTE — TELEPHONE ENCOUNTER
Attempt to reach patient, RE: 5 yr txp cath s/p heart transplant.  No answer, left detailed message for patient to return office call.  
Father  Still living? No  Family history of lung cancer, Age at diagnosis: Age Unknown     Mother  Still living? No  Family history of heart disease, Age at diagnosis: Age Unknown

## 2024-11-22 DIAGNOSIS — E78.2 MIXED HYPERLIPIDEMIA: ICD-10-CM

## 2024-11-22 DIAGNOSIS — Z94.1 S/P ORTHOTOPIC HEART TRANSPLANT: ICD-10-CM

## 2024-11-22 DIAGNOSIS — I25.811 CORONARY ARTERY DISEASE INVOLVING NATIVE ARTERY OF TRANSPLANTED HEART WITHOUT ANGINA PECTORIS: ICD-10-CM

## 2024-11-22 RX ORDER — ROSUVASTATIN CALCIUM 40 MG/1
40 TABLET, COATED ORAL NIGHTLY
Qty: 90 TABLET | Refills: 3 | Status: SHIPPED | OUTPATIENT
Start: 2024-11-22

## 2024-11-26 ENCOUNTER — PATIENT MESSAGE (OUTPATIENT)
Dept: CARDIOLOGY | Facility: CLINIC | Age: 54
End: 2024-11-26
Payer: COMMERCIAL

## 2024-11-26 ENCOUNTER — EDUCATION (OUTPATIENT)
Dept: CARDIOLOGY | Facility: CLINIC | Age: 54
End: 2024-11-26
Payer: COMMERCIAL

## 2024-11-26 DIAGNOSIS — Z94.1 STATUS POST HEART TRANSPLANTATION: Primary | ICD-10-CM

## 2024-11-26 DIAGNOSIS — Z94.1 STATUS POST HEART TRANSPLANT: Primary | ICD-10-CM

## 2024-11-26 RX ORDER — DIPHENHYDRAMINE HCL 50 MG
50 CAPSULE ORAL ONCE
OUTPATIENT
Start: 2024-11-26 | End: 2024-11-26

## 2024-11-26 NOTE — PROGRESS NOTES
If you need to change the date of your procedure, please call  Mary Beth AMATO [phone]  CALL THE OFFICE IF YOU HAVE ANY MEDICATIONS CHANGES BEFORE A PROCEDURE.    COMPLETE LABS AS ORDERED ON 1/27/25,  DR CUELLAR HAS ORDERED SOME LABS TO YOUR ROUTINE ALREADY PLANNED APPOINTMENT AT [facility].  YOUR CLINIC APPOINTMENT WILL PROCEED AS SCHEDULED 3RD FLOOR INTERVENTIONAL CARDIOLOGY, DR CUELLAR ON 2/6/25 AT 3:00pm.    PROCEDURE CHECK IN:     OUTPATIENT CATHETERIZATION INSTRUCTIONS     You have been scheduled for a procedure in the catheterization lab on  FRIDAY, FEBRUARY 7, 2025     Please report to the Cardiology Waiting Area on the Third floor of the hospital and check in at ARRIVAL TIME AT 0600AM.   You will then be taken to the SSCU (Short Stay Cardiac Unit) and prepared for your procedure. Please be aware that this is not the time of your procedure but the time you are to arrive. The procedures are scheduled on an hourly basis; however, emergency cases take precedence over all other cases.         1. NOTHING TO EAT AFTER MIDNIGHT 12AM the morning of the procedure.  NO FOOD.  You may take your medications with small sips of water. SEE BELOW INSTRUCTIONS ON MEDICATIONS.     2.   CONTINUE YOUR DAILY MEDICATIONS EVERYDAY INCLUDING THE MORNING OF THE PROCEDURE WITH WATER.  YOU MAY HAVE WATER UNTIL YOU REACH YOUR CHECK IN TIME. Do not stop your Aspirin  SEE BELOW.    3.  Diabetics: NONE  IF TAKING OZEMPIC- THIS MUST BE HELD FOR ONE WEEK PRIOR TO PROCEDURE.    4. Heart Failure Patients:  NONE        The procedure will take 1-2 hours to perform. After the procedure, you will return to SSCU on the third floor of the hospital. You will need to lie still (or keep your arm still) for the next 4 to 6 hours to minimize bleeding from the puncture site. Your family may remain in the room with you during this time.         You may be able to be discharged home that same afternoon if there is someone to drive you home and there were no  complications. If you have one of the balloon, stent, or device procedures you may spend the night in the hospital. Your doctor will determine, based on your progress, the date and time of your discharge. The results of your procedure will be discussed with you before you are discharged. Any further testing or procedures will be scheduled for you either before you leave or you will be called with these appointments.   YOU WILL NOT BE ABLE TO DRIVE HOME  THE DAY OF THE PROCEDURE.      If you should have any questions, concerns, or please call Mary Beth [phone]        Special Instructions: IMPORTANT TO HYDRATE  Drink plenty of water the day before the procedure and the day after the procedure to flush your kidneys.        IMPORTANT:  HOLD vitamins -  take these medications after the procedure.    DO NOT STOP ASPIRIN.  Take the morning of the procedure, if you are on a daily aspirin dose.        THE ABOVE INSTRUCTIONS WERE GIVEN TO THE PATIENT VERBALLY AND THEY VERBALIZED UNDERSTANDING.  THEY DO NOT REQUIRE ANY SPECIAL NEEDS AND DO NOT HAVE ANY LEARNING BARRIERS.      Directions for Reporting to Cardiology Waiting Area in the Hospital  If you park in the Parking Garage:  Take elevators to the1st floor of the parking garage.  Continue past the gift shop, coffee shop, and piano.  Take a right and go to the gold elevators. (Elevator B)  Take the elevator to the 3rd floor.  Follow the arrow on the sign on the wall that says Cath Lab Registration/EP Lab Registration.  Follow the long hallway all the way around until you come to a big open area.  This is the registration area.  Check in at Reception Desk.     OR     If family is dropping you off:  Have them drop you off at the front of the Hospital under the green overhang.  Enter through the doors and take a right.  Take the 'E' elevators to the 3rd floor Cardiology Waiting Area.  Check in at the Reception Desk in the waiting room.

## 2024-12-30 DIAGNOSIS — Z94.1 HEART TRANSPLANTED: ICD-10-CM

## 2024-12-30 RX ORDER — PANTOPRAZOLE SODIUM 40 MG/1
40 TABLET, DELAYED RELEASE ORAL
Qty: 90 TABLET | Refills: 3 | Status: SHIPPED | OUTPATIENT
Start: 2024-12-30

## 2025-01-08 ENCOUNTER — PATIENT MESSAGE (OUTPATIENT)
Dept: TRANSPLANT | Facility: CLINIC | Age: 55
End: 2025-01-08
Payer: COMMERCIAL

## 2025-01-13 DIAGNOSIS — Z94.1 STATUS POST HEART TRANSPLANTATION: Primary | ICD-10-CM

## 2025-01-29 ENCOUNTER — TELEPHONE (OUTPATIENT)
Dept: CARDIOLOGY | Facility: CLINIC | Age: 55
End: 2025-01-29
Payer: COMMERCIAL

## 2025-01-29 NOTE — TELEPHONE ENCOUNTER
Pt called to report she would like to reschedule.  Assisted to move appointment and new educational materials to be sent to patient home.  Angiogram now scheduled for 3/7/25. Clinic visit 3/6/25.  Labs to be done with transplant labs on 2/24/25.

## 2025-02-12 ENCOUNTER — PATIENT MESSAGE (OUTPATIENT)
Dept: TRANSPLANT | Facility: CLINIC | Age: 55
End: 2025-02-12
Payer: COMMERCIAL

## 2025-02-24 ENCOUNTER — TELEPHONE (OUTPATIENT)
Dept: TRANSPLANT | Facility: CLINIC | Age: 55
End: 2025-02-24

## 2025-02-24 ENCOUNTER — OFFICE VISIT (OUTPATIENT)
Dept: TRANSPLANT | Facility: CLINIC | Age: 55
End: 2025-02-24
Attending: INTERNAL MEDICINE
Payer: COMMERCIAL

## 2025-02-24 ENCOUNTER — HOSPITAL ENCOUNTER (OUTPATIENT)
Dept: RADIOLOGY | Facility: HOSPITAL | Age: 55
Discharge: HOME | End: 2025-02-24
Attending: INTERNAL MEDICINE
Payer: COMMERCIAL

## 2025-02-24 VITALS
HEIGHT: 68 IN | SYSTOLIC BLOOD PRESSURE: 131 MMHG | WEIGHT: 239 LBS | HEART RATE: 126 BPM | DIASTOLIC BLOOD PRESSURE: 86 MMHG | BODY MASS INDEX: 36.22 KG/M2

## 2025-02-24 DIAGNOSIS — Z79.899 ENCOUNTER FOR LONG-TERM (CURRENT) USE OF MEDICATIONS: Primary | ICD-10-CM

## 2025-02-24 DIAGNOSIS — Z79.899 IMMUNOCOMPROMISED STATE DUE TO DRUG THERAPY: ICD-10-CM

## 2025-02-24 DIAGNOSIS — Z94.1 S/P ORTHOTOPIC HEART TRANSPLANT: ICD-10-CM

## 2025-02-24 DIAGNOSIS — D86.85 CARDIAC SARCOIDOSIS: ICD-10-CM

## 2025-02-24 DIAGNOSIS — E78.2 MIXED HYPERLIPIDEMIA: ICD-10-CM

## 2025-02-24 DIAGNOSIS — Z94.1 STATUS POST HEART TRANSPLANT: ICD-10-CM

## 2025-02-24 DIAGNOSIS — D84.821 IMMUNOCOMPROMISED STATE DUE TO DRUG THERAPY: ICD-10-CM

## 2025-02-24 DIAGNOSIS — I42.5 RESTRICTIVE CARDIOMYOPATHY: ICD-10-CM

## 2025-02-24 DIAGNOSIS — D84.821 IMMUNODEFICIENCY DUE TO TREATMENT WITH IMMUNOSUPPRESSIVE MEDICATION: ICD-10-CM

## 2025-02-24 DIAGNOSIS — N18.31 STAGE 3A CHRONIC KIDNEY DISEASE: ICD-10-CM

## 2025-02-24 DIAGNOSIS — Z79.899 IMMUNODEFICIENCY DUE TO TREATMENT WITH IMMUNOSUPPRESSIVE MEDICATION: ICD-10-CM

## 2025-02-24 DIAGNOSIS — Z94.1 HEART TRANSPLANTED: Primary | ICD-10-CM

## 2025-02-24 PROCEDURE — 71046 X-RAY EXAM CHEST 2 VIEWS: CPT | Mod: TC,FY

## 2025-02-24 PROCEDURE — 3079F DIAST BP 80-89 MM HG: CPT | Mod: CPTII,S$GLB,, | Performed by: INTERNAL MEDICINE

## 2025-02-24 PROCEDURE — 1159F MED LIST DOCD IN RCRD: CPT | Mod: CPTII,S$GLB,, | Performed by: INTERNAL MEDICINE

## 2025-02-24 PROCEDURE — 99214 OFFICE O/P EST MOD 30 MIN: CPT | Mod: S$GLB,,, | Performed by: INTERNAL MEDICINE

## 2025-02-24 PROCEDURE — 3075F SYST BP GE 130 - 139MM HG: CPT | Mod: CPTII,S$GLB,, | Performed by: INTERNAL MEDICINE

## 2025-02-24 PROCEDURE — 3008F BODY MASS INDEX DOCD: CPT | Mod: CPTII,S$GLB,, | Performed by: INTERNAL MEDICINE

## 2025-02-24 PROCEDURE — 99999 PR PBB SHADOW E&M-EST. PATIENT-LVL V: CPT | Mod: PBBFAC,,, | Performed by: INTERNAL MEDICINE

## 2025-02-24 PROCEDURE — 71046 X-RAY EXAM CHEST 2 VIEWS: CPT | Mod: 26,,, | Performed by: RADIOLOGY

## 2025-02-24 NOTE — LETTER
February 24, 2025        Joaquin Del Toro  [address]  [address]  [place] [address]  Phone: [phone]  Fax: [phone]             [id number]  [address]  [place] [facility] [address]  Phone: [phone]   Patient: Deborah Navas   MR Number: [id number]   YOB: 1970   Date of Visit: 2/24/2025       Dear Dr. Joaquin Del Toro    Thank you for referring Deborah Navas to me for evaluation. Attached you will find relevant portions of my assessment and plan of care.    If you have questions, please do not hesitate to call me. I look forward to following Deborah Navas along with you.    Sincerely,    Avni Escalera MD    Enclosure    If you would like to receive this communication electronically, please contact [email or web address] or [phone] to request [email or web address] Link access.    [email or web address] Link is a tool which provides read-only access to select patient information with whom you have a relationship. Its easy to use and provides real time access to review your patients record including encounter summaries, notes, results, and demographic information.    If you feel you have received this communication in error or would no longer like to receive these types of communications, please e-mail [email or web address]

## 2025-02-24 NOTE — TELEPHONE ENCOUNTER
Reason for visit:   5 years  post heart transplant clinic visit.     Immunosuppression:  Sirolimus 2 mg QD  MMF 1000 mg BID  Prednisone 5 mg QD    Assessment:  Patient denies chest pain, SOB, or palpitations. No N/V/D. No signs of edema. No fevers or S/S of infection. Patient stated she feels that she has been doing well overall. Sirolimus level is 7.6, with a goal level of 5-10.     Psychosocial Evaluation:  -physical activity: Patient stated she tries to be active. However, patient has not been exercising routinely lately due to caring for her father.  -work: works full-time in remote job  -transportation: personal vehicle  -support: family and friends  -issues/concerns: Patient's father recently fell off of roof and is requiring care from Ms. Sheth. Patient trying to balance work-life while also caring for injured dad.     Education:   Reviewed trying to find time prioritize herself each day. Encouraged regular activity such as walking and healthy diet while patient is balancing many responsibilities.     Reviewed health maintenance activities. Patient up-to-date on DXA scan, colonoscopy. Saw dentist in August, reminded patient the see dentist every 6 months. Saw PCP last June and GYN last April. Patient stated she will schedule appt with her local dermatologist for routine skin check.    Medications, lab and echo results reviewed with patient and Dr. Escalera. See MD note for orders and recommendations.    Next follow up in a few week. Appt with Dr. Hernandez on 3/6, [facility] on 3/7. Unable to complete Echo today. Echo rescheduled to 3/6.  Patient verbalized understanding of upcoming appts.    Donor family reached out to coordinator for recipient update. Ms. Sheth stated txp coordinator may update family and answer their questions. She would like to write them a letter when she hast time.

## 2025-02-24 NOTE — PROGRESS NOTES
Subjective:   Ms. Navas is a 54 y.o. year old White female who received a donation after brain death heart transplant on 12/16/19.      CMV status:   Donor: +  Recipient: -    53 YO F s/p OHT 12/16/2019 is here for  post OHT visit.  She had extensive sarcoidosis on her explanted heart which was a new diagnosis for her at the time. She established care with rheumatology who believe that her IS for OHT is keeping her sarcoid under control as there is no evidence of extracardiac sarcoid at this time. 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     During my prior visit with her in March she had a PET FDG done which showed a focal area of FDG update. This was followed by a cardiac MRI showing no areas of LGE.    She comes in today for follow-up.  Working full-time doing well.  Denies cardiovascular symptoms.    04/03/2024 echo       Post cardiac transplantation study (OHTx 12/16/19)    Left Ventricle: The left ventricle is normal in size. Normal wall thickness. Normal wall motion. There is normal systolic function with a visually estimated ejection fraction of 60 - 65%. There is normal diastolic function.    Right Ventricle: Normal right ventricular cavity size. Wall thickness is normal. Right ventricle wall motion  is normal. Systolic function is normal.    Pulmonary Artery: The estimated pulmonary artery systolic pressure is 15 mmHg.    IVC/SVC: Normal venous pressure at 3 mmHg.     03/14/2024 cardiac PET FDG    The study is positive with focal FDG uptake in the base to mid anterior, septal, lateral, and inferolateral heart.  This is suggestive of active cardiac sarcoid in the appropriate clinical context.    Resting perfusion is largely normal, suggestive of no significant myocardial scar.    CMR 4/25/24  Impression:   The left ventricular volumes are normal. There is no evidence of LV wall motion anomalies. The calculated LVEF is 63 %.   The right ventricle volumes are normal. The calculated RVEF is 56  "%.  Normal rest perfusion.  No evidence of myocardial fibrosis of the left or right ventricular myocardium on myocardial delayed enhancement imaging.  No prior cardiac MRI for comparison.    ROS    Objective:   Blood pressure 131/86, pulse (!) 126, height 5' 8" (1.727 m), weight 108.4 kg (238 lb 15.7 oz).body mass index is 36.34 kg/m².    Physical Exam  HENT:      Head: Atraumatic.   Eyes:      Extraocular Movements: Extraocular movements intact.   Cardiovascular:      Rate and Rhythm: Normal rate and regular rhythm.      Pulses: Normal pulses.      Heart sounds: Normal heart sounds.   Pulmonary:      Breath sounds: Normal breath sounds.   Abdominal:      Palpations: Abdomen is soft.      Tenderness: There is no abdominal tenderness.   Musculoskeletal:         General: Normal range of motion.      Right lower leg: No edema.      Left lower leg: No edema.   Neurological:      General: No focal deficit present.      Mental Status: She is alert and oriented to person, place, and time.         Lab Results   Component Value Date    WBC 5.89 02/24/2025    WBC 5.89 02/24/2025    HGB 13.0 02/24/2025    HGB 13.0 02/24/2025    HCT 43.3 02/24/2025    HCT 43.3 02/24/2025    MCV 85 02/24/2025    MCV 85 02/24/2025     02/24/2025     02/24/2025    CO2 22 (L) 10/21/2024    CREATININE 1.1 10/21/2024    CALCIUM 9.5 10/21/2024    ALBUMIN 3.6 10/21/2024    AST 29 10/21/2024    BNP 39 10/14/2024    ALT 43 10/21/2024       Lab Results   Component Value Date    INR 1.2 12/23/2019    INR 1.4 (H) 12/22/2019    INR 1.3 (H) 12/21/2019       BNP   Date Value Ref Range Status   10/14/2024 39 0 - 99 pg/mL Final     Comment:     Values of less than 100 pg/ml are consistent with non-CHF populations.   07/25/2024 51 0 - 99 pg/mL Final     Comment:     Values of less than 100 pg/ml are consistent with non-CHF populations.   04/12/2024 26 0 - 99 pg/mL Final     Comment:     Values of less than 100 pg/ml are consistent with non-CHF " "populations.       LD   Date Value Ref Range Status   12/16/2019 240 110 - 260 U/L Final     Comment:     Results are increased in hemolyzed samples.   12/15/2019 246 110 - 260 U/L Final     Comment:     Results are increased in hemolyzed samples.   12/14/2019 257 110 - 260 U/L Final     Comment:     Results are increased in hemolyzed samples.       Tacrolimus Lvl   Date Value Ref Range Status   08/21/2023 4.0 (L) 5.0 - 15.0 ng/mL Final     Comment:     Testing performed by a chemiluminescent microparticle   immunoassay on the [employer].    CAUTION: No firm therapeutic range exists for tacrolimus in whole   blood. The   complexity of the clinical state, individual differences in   sensitivity to   immunosuppressive and nephrotoxic effects of tacrolimus,   co-administration   of other immunosuppressants, type of transplant, time post-transplant   and a   number of other factors contribute to different requirements for   optimal   blood levels of tacrolimus. Therefore, individual tacrolimus values   cannot   be used as the sole indicator for making changes in treatment regimen   and   each patient should be thoroughly evaluated clinically before changes   in   treatment regimens are made. Each user must establish his or her own   ranges   based on clinical experience.  Therapeutic ranges vary according to the commercial test used, and   therefore   should be established for each commercial test. Values obtained with   different assay methods cannot be used interchangeably due to   differences in   assay methods and cross-reactivity with metabolites, nor should   correction   factors be applied. Therefore, consistent use of one assay for   individual   patients is recommended.       No results found for: "SIROLIMUS"  No results found for: "CYCLOSPORINE"    No results found for this or any previous visit.    No results found for this or any previous visit.      Labs were reviewed with the " patient.    Assessment:     1. Heart transplanted    2. 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 Restrictive cardiomyopathy post heart transplant    4. Mixed hyperlipidemia    5. S/P orthotopic heart transplant    6. Stage 3a chronic kidney disease    7. 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 Immunodeficiency due to treatment with immunosuppressive medication        Plan:     - here for her 5 year annual post heart transplant visit.  Doing well.  Euvolemic on exam.  - did not get an echocardiogram today but is scheduled to get an echocardiogram during her visit for her coronary angiogram which is scheduled for 03/07/2025.  -continue meds unchanged at this time.  As noted above, although there was FDG uptake on her PET scan, most recent MRI does not have any evidence of sarcoidosis.  And continue to monitor with echocardiogram and EKG for any evidence of sarcoidosis in her transplanted heart.      Return instructions as set forth by post transplant schedule or as needed:    Clinic: Return for labs and/or biopsy weekly the first month, every two weeks during month 2 and then monthly for the first year at the provider or coordinator's discretion. During the second year, return to clinic every 3 months. Post transplant year 3-5 return every 6 months. There will be a comprehensive post transplant evaluation every year that may include LHC/RHC/biopsy, stress test, echo, CXR, and other health screening exams.    In addition to the clinical assessment, I have ordered Allomap testing for this patient to assist in identification of moderate/severe acute cellular rejection (ACR) in a pt with stable Allograft function instead of endomyocardial biopsy.     Patient is reminded to call with any health changes as these can be early signs of transplant complications. Patient is advised to make sure any new medications or changes of old medications are discussed with a pharmacist or  physician knowledgeable with transplant to avoid rejection/drug toxicity related to significant drug interactions.    Patient advised that it is recommended that all transplanted patients, and their close contacts and household members receive Covid vaccination.    UNOS Patient Status  Functional Status: 90% - Able to carry on normal activity: minor symptoms of disease  Physical Capacity: No Limitations  Working for Income: yes  If yes, working activity level: Working Full Time    Advance Care Planning     Date: 02/24/2025    Power of   I initiated the process of voluntary advance care planning today and explained the importance of this process to the patient.  I introduced the concept of advance directives to the patient, as well. Then the patient received detailed information about the importance of designating a Health Care Power of  (HCPOA). She was also instructed to communicate with this person about their wishes for future healthcare, should she become sick and lose decision-making capacity. The patient has previously appointed a HCPOA.      Avni Escalera MD

## 2025-02-25 ENCOUNTER — RESULTS FOLLOW-UP (OUTPATIENT)
Dept: TRANSPLANT | Facility: CLINIC | Age: 55
End: 2025-02-25

## 2025-02-25 DIAGNOSIS — M79.2 NERVE PAIN: Primary | ICD-10-CM

## 2025-02-25 RX ORDER — GABAPENTIN 300 MG/1
300 CAPSULE ORAL NIGHTLY
Qty: 30 CAPSULE | Refills: 11 | Status: SHIPPED | OUTPATIENT
Start: 2025-02-25

## 2025-02-26 DIAGNOSIS — Z94.1 STATUS POST HEART TRANSPLANT: ICD-10-CM

## 2025-02-26 RX ORDER — PREDNISONE 5 MG/1
5 TABLET ORAL
Qty: 30 TABLET | Refills: 11 | Status: SHIPPED | OUTPATIENT
Start: 2025-02-26

## 2025-03-04 ENCOUNTER — PATIENT MESSAGE (OUTPATIENT)
Dept: TRANSPLANT | Facility: CLINIC | Age: 55
End: 2025-03-04
Payer: COMMERCIAL

## 2025-03-06 ENCOUNTER — HOSPITAL ENCOUNTER (OUTPATIENT)
Dept: CARDIOLOGY | Facility: HOSPITAL | Age: 55
Discharge: HOME | End: 2025-03-06
Attending: INTERNAL MEDICINE
Payer: COMMERCIAL

## 2025-03-06 ENCOUNTER — OFFICE VISIT (OUTPATIENT)
Dept: CARDIOLOGY | Facility: CLINIC | Age: 55
End: 2025-03-06
Payer: COMMERCIAL

## 2025-03-06 VITALS
BODY MASS INDEX: 35.59 KG/M2 | SYSTOLIC BLOOD PRESSURE: 149 MMHG | DIASTOLIC BLOOD PRESSURE: 85 MMHG | WEIGHT: 234.81 LBS | HEIGHT: 68 IN | HEART RATE: 129 BPM | OXYGEN SATURATION: 98 %

## 2025-03-06 VITALS
HEART RATE: 104 BPM | WEIGHT: 233.69 LBS | HEIGHT: 67 IN | BODY MASS INDEX: 36.68 KG/M2 | DIASTOLIC BLOOD PRESSURE: 85 MMHG | SYSTOLIC BLOOD PRESSURE: 149 MMHG

## 2025-03-06 DIAGNOSIS — Z94.1 STATUS POST HEART TRANSPLANT: ICD-10-CM

## 2025-03-06 DIAGNOSIS — Z94.1 STATUS POST HEART TRANSPLANTATION: Primary | ICD-10-CM

## 2025-03-06 DIAGNOSIS — T86.290 VASCULOPATHY OF CARDIAC ALLOGRAFT: ICD-10-CM

## 2025-03-06 DIAGNOSIS — D86.85 CARDIAC SARCOIDOSIS: ICD-10-CM

## 2025-03-06 LAB
ASCENDING AORTA: 2.47 CM
AV AREA BY CONTINUOUS VTI: 2.9 CM2
AV INDEX (PROSTH): 0.85
AV LVOT MEAN GRADIENT: 2 MMHG
AV LVOT PEAK GRADIENT: 3 MMHG
AV MEAN GRADIENT: 4 MMHG
AV PEAK GRADIENT: 7 MMHG
AV VALVE AREA BY VELOCITY RATIO: 2.1 CM²
AV VALVE AREA: 2.9 CM2
AV VELOCITY RATIO: 0.62
BSA FOR ECHO PROCEDURE: 2.24 M2
CV ECHO LV RWT: 0.38 CM
DOP CALC AO PEAK VEL: 1.3 M/S
DOP CALC AO VTI: 18.2 CM
DOP CALC LVOT AREA: 3.5 CM2
DOP CALC LVOT DIAMETER: 2.1 CM
DOP CALC LVOT PEAK VEL: 0.8 M/S
DOP CALC LVOT STROKE VOLUME: 53.7 CM3
DOP CALCLVOT PEAK VEL VTI: 15.5 CM
ECHO EF ESTIMATED: 77 %
ECHO LV POSTERIOR WALL: 0.9 CM (ref 0.6–1.1)
FRACTIONAL SHORTENING: 44.7 % (ref 28–44)
INTERVENTRICULAR SEPTUM: 0.6 CM (ref 0.6–1.1)
IVC DIAMETER: 1.18 CM
IVRT: 86 MS
LEFT ATRIUM SIZE: 4.2 CM
LEFT INTERNAL DIMENSION IN SYSTOLE: 2.6 CM (ref 2.1–4)
LEFT VENTRICLE DIASTOLIC VOLUME INDEX: 48.15 ML/M2
LEFT VENTRICLE DIASTOLIC VOLUME: 104 ML
LEFT VENTRICLE MASS INDEX: 52.1 G/M2
LEFT VENTRICLE SYSTOLIC VOLUME INDEX: 11.1 ML/M2
LEFT VENTRICLE SYSTOLIC VOLUME: 24 ML
LEFT VENTRICULAR INTERNAL DIMENSION IN DIASTOLE: 4.7 CM (ref 3.5–6)
LEFT VENTRICULAR MASS: 112.5 G
OHS CV RV/LV RATIO: 0.81 CM
PISA TR MAX VEL: 2.4 M/S
PULM VEIN S/D RATIO: 0.93
PV PEAK D VEL: 0.41 M/S
PV PEAK S VEL: 0.38 M/S
RA PRESSURE ESTIMATED: 3 MMHG
RIGHT VENTRICLE DIASTOLIC BASEL DIMENSION: 3.8 CM
RV TB RVSP: 5 MMHG
RV TISSUE DOPPLER FREE WALL SYSTOLIC VELOCITY 1 (APICAL 4 CHAMBER VIEW): 8.49 CM/S
SINUS: 3 CM
STJ: 2.31 CM
TDI LATERAL: 0.11 M/S
TDI SEPTAL: 0.1 M/S
TDI: 0.11 M/S
TRICUSPID ANNULAR PLANE SYSTOLIC EXCURSION: 0.97 CM
TV PEAK GRADIENT: 23 MMHG
TV REST PULMONARY ARTERY PRESSURE: 26 MMHG
Z-SCORE OF LEFT VENTRICULAR DIMENSION IN END DIASTOLE: -4.09
Z-SCORE OF LEFT VENTRICULAR DIMENSION IN END SYSTOLE: -4

## 2025-03-06 PROCEDURE — 99999 PR PBB SHADOW E&M-EST. PATIENT-LVL V: CPT | Mod: PBBFAC,,, | Performed by: INTERNAL MEDICINE

## 2025-03-06 PROCEDURE — 3075F SYST BP GE 130 - 139MM HG: CPT | Mod: CPTII,S$GLB,, | Performed by: INTERNAL MEDICINE

## 2025-03-06 PROCEDURE — 99214 OFFICE O/P EST MOD 30 MIN: CPT | Mod: S$GLB,,, | Performed by: INTERNAL MEDICINE

## 2025-03-06 PROCEDURE — 1159F MED LIST DOCD IN RCRD: CPT | Mod: CPTII,S$GLB,, | Performed by: INTERNAL MEDICINE

## 2025-03-06 PROCEDURE — 1160F RVW MEDS BY RX/DR IN RCRD: CPT | Mod: CPTII,S$GLB,, | Performed by: INTERNAL MEDICINE

## 2025-03-06 PROCEDURE — 3044F HG A1C LEVEL LT 7.0%: CPT | Mod: CPTII,S$GLB,, | Performed by: INTERNAL MEDICINE

## 2025-03-06 PROCEDURE — 3008F BODY MASS INDEX DOCD: CPT | Mod: CPTII,S$GLB,, | Performed by: INTERNAL MEDICINE

## 2025-03-06 PROCEDURE — 93306 TTE W/DOPPLER COMPLETE: CPT

## 2025-03-06 PROCEDURE — 93306 TTE W/DOPPLER COMPLETE: CPT | Mod: 26,,, | Performed by: INTERNAL MEDICINE

## 2025-03-06 PROCEDURE — 3079F DIAST BP 80-89 MM HG: CPT | Mod: CPTII,S$GLB,, | Performed by: INTERNAL MEDICINE
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
## 2025-03-07 ENCOUNTER — HOSPITAL ENCOUNTER (OUTPATIENT)
Facility: HOSPITAL | Age: 55
Discharge: HOME | End: 2025-03-07
Attending: INTERNAL MEDICINE | Admitting: INTERNAL MEDICINE
Payer: COMMERCIAL

## 2025-03-07 VITALS
WEIGHT: 234 LBS | RESPIRATION RATE: 16 BRPM | HEART RATE: 105 BPM | DIASTOLIC BLOOD PRESSURE: 67 MMHG | TEMPERATURE: 98 F | SYSTOLIC BLOOD PRESSURE: 110 MMHG | OXYGEN SATURATION: 99 % | BODY MASS INDEX: 35.46 KG/M2 | HEIGHT: 68 IN

## 2025-03-07 DIAGNOSIS — Z01.818 PREOPERATIVE TESTING: ICD-10-CM

## 2025-03-07 DIAGNOSIS — Z94.1 STATUS POST HEART TRANSPLANTATION: ICD-10-CM

## 2025-03-07 LAB
OHS QRS DURATION: 70 MS
OHS QTC CALCULATION: 469 MS

## 2025-03-07 PROCEDURE — 93454 CORONARY ARTERY ANGIO S&I: CPT | Performed by: INTERNAL MEDICINE

## 2025-03-07 PROCEDURE — 92978 ENDOLUMINL IVUS OCT C 1ST: CPT | Mod: LD | Performed by: INTERNAL MEDICINE

## 2025-03-07 PROCEDURE — 99152 MOD SED SAME PHYS/QHP 5/>YRS: CPT | Mod: ,,, | Performed by: INTERNAL MEDICINE

## 2025-03-07 PROCEDURE — 93005 ELECTROCARDIOGRAM TRACING: CPT

## 2025-03-07 PROCEDURE — 63600175 PHARM REV CODE 636 W HCPCS: Performed by: INTERNAL MEDICINE

## 2025-03-07 PROCEDURE — 92978 ENDOLUMINL IVUS OCT C 1ST: CPT | Mod: 26,LD,, | Performed by: INTERNAL MEDICINE

## 2025-03-07 PROCEDURE — 25000003 PHARM REV CODE 250: Performed by: INTERNAL MEDICINE

## 2025-03-07 PROCEDURE — C1769 GUIDE WIRE: HCPCS | Performed by: INTERNAL MEDICINE

## 2025-03-07 PROCEDURE — 93454 CORONARY ARTERY ANGIO S&I: CPT | Mod: 26,,, | Performed by: INTERNAL MEDICINE

## 2025-03-07 PROCEDURE — 93010 ELECTROCARDIOGRAM REPORT: CPT | Mod: ,,, | Performed by: INTERNAL MEDICINE

## 2025-03-07 PROCEDURE — C1887 CATHETER, GUIDING: HCPCS | Performed by: INTERNAL MEDICINE

## 2025-03-07 PROCEDURE — C1753 CATH, INTRAVAS ULTRASOUND: HCPCS | Performed by: INTERNAL MEDICINE

## 2025-03-07 PROCEDURE — 99152 MOD SED SAME PHYS/QHP 5/>YRS: CPT | Performed by: INTERNAL MEDICINE

## 2025-03-07 PROCEDURE — 25500020 PHARM REV CODE 255: Performed by: INTERNAL MEDICINE

## 2025-03-07 PROCEDURE — C1894 INTRO/SHEATH, NON-LASER: HCPCS | Performed by: INTERNAL MEDICINE

## 2025-03-07 RX ORDER — LIDOCAINE HYDROCHLORIDE 20 MG/ML
INJECTION, SOLUTION EPIDURAL; INFILTRATION; INTRACAUDAL; PERINEURAL
Status: DISCONTINUED | OUTPATIENT
Start: 2025-03-07 | End: 2025-03-07 | Stop reason: HOSPADM

## 2025-03-07 RX ORDER — FENTANYL CITRATE 50 UG/ML
INJECTION, SOLUTION INTRAMUSCULAR; INTRAVENOUS
Status: DISCONTINUED | OUTPATIENT
Start: 2025-03-07 | End: 2025-03-07 | Stop reason: HOSPADM

## 2025-03-07 RX ORDER — SODIUM CHLORIDE 9 MG/ML
INJECTION, SOLUTION INTRAVENOUS CONTINUOUS
Status: ACTIVE | OUTPATIENT
Start: 2025-03-07 | End: 2025-03-07

## 2025-03-07 RX ORDER — DIPHENHYDRAMINE HCL 50 MG
50 CAPSULE ORAL ONCE
Status: COMPLETED | OUTPATIENT
Start: 2025-03-07 | End: 2025-03-07

## 2025-03-07 RX ORDER — HEPARIN SODIUM 1000 [USP'U]/ML
INJECTION, SOLUTION INTRAVENOUS; SUBCUTANEOUS
Status: DISCONTINUED | OUTPATIENT
Start: 2025-03-07 | End: 2025-03-07 | Stop reason: HOSPADM

## 2025-03-07 RX ORDER — HEPARIN SOD,PORCINE/0.9 % NACL 1000/500ML
INTRAVENOUS SOLUTION INTRAVENOUS
Status: DISCONTINUED | OUTPATIENT
Start: 2025-03-07 | End: 2025-03-07 | Stop reason: HOSPADM

## 2025-03-07 RX ORDER — MIDAZOLAM HYDROCHLORIDE 1 MG/ML
INJECTION INTRAMUSCULAR; INTRAVENOUS
Status: DISCONTINUED | OUTPATIENT
Start: 2025-03-07 | End: 2025-03-07 | Stop reason: HOSPADM

## 2025-03-07 RX ADMIN — DIPHENHYDRAMINE HYDROCHLORIDE 50 MG: 50 CAPSULE ORAL at 06:03

## 2025-03-07 NOTE — PLAN OF CARE
Received report from Monica. Patient s/p [facility], AAOx3. VSS, no c/o pain or discomfort at this time, resp even and unlabored. Vascband dressing to R wrist is CDI. No active bleeding. No hematoma noted. Post procedure protocol reviewed with patient. Understanding verbalized. Nurse call bell within reach.

## 2025-03-07 NOTE — INTERVAL H&P NOTE
The patient has been examined and the H&P has been reviewed:    I concur with the findings and no changes have occurred since H&P was written.    Procedure risks, benefits and alternative options discussed and understood by patient/family.    Constitutional: No distress, obese, conversant  HEENT: Sclera anicteric, PERRLA, EOMI  Neck: No JVD, no masses, good movement  CV: RRR, S1 and S2 normal, no additional heart sounds or murmurs. Pulses 2+ and equal bilaterally in radial arteries, femoral, DP and PT areas bilaterally  Pulm: Clear to auscultation bilaterally with symmetrical expansion.   GI: Abdomen soft, non-tender, good bowel sounds  Extremities: Both extremities intact and grossly normal, skin is warm, no edema noted  Skin: No ecchymosis, erythema, or ulcers  Neuro: CNII-XII intact, no focal deficits  Psych: AOx3, appropriate affect    There are no hospital problems to display for this patient.

## 2025-03-07 NOTE — DISCHARGE SUMMARY
[facility] - Cath Lab  Discharge Note  Short Stay    Procedure(s) (LRB):  ANGIOGRAM, CORONARY ARTERY (N/A)  IVUS, Coronary (N/A)    Pt brought to the cath lab.  RRA access obtained with U/S guidance.  C showed normal coronary arteries by IVUS.  Pt tolerated procedure without any complications and radial band placed with successful hemostasis.  Pt subsequently discharged home.    OUTCOME: Patient tolerated treatment/procedure well without complication and is now ready for discharge.    DISPOSITION: Home or Self Care    FINAL DIAGNOSIS:  <principal problem not specified>    FOLLOWUP: In clinic    DISCHARGE INSTRUCTIONS:  No discharge procedures on file.     TIME SPENT ON DISCHARGE: 31 minutes

## 2025-03-07 NOTE — Clinical Note
The catheter was inserted into the distal   left anterior descending. IVUS was performed of the LAD-LM. Catheter removed

## 2025-03-07 NOTE — BRIEF OP NOTE
Post Cath Note  Preoperative Diagnosis: Status post heart transplantation [Z94.1]   Postop Diagnosis: Status post heart transplantation [Z94.1]  Referring Physician: Julian Hernandez III     Procedure: ANGIOGRAM, CORONARY ARTERY (N/A), IVUS, Coronary (N/A)       Access: Right radial  : Julian Hernandez III, MD   All Operators: Surgeon(s):  Julian Hernandez III, MD Subramaniam, Venkat, MD         See full report for further details    Intervention:   - S/P [facility] with IVUS    Treatments/Procedures: Procedure(s) (LRB):  ANGIOGRAM, CORONARY ARTERY (N/A)  IVUS, Coronary (N/A)     -Closure device: Radial band    Findings:  Normal coronary arteries    Estimated Blood loss: 20 cc    Specimens removed: No  Post Cath Exam:     Vitals:    03/07/25 0630   BP: 113/69   Pulse:    Resp:    Temp:      No unusual pain, hematoma, thrill or bruit at vascular access site.  Distal pulse present without signs of ischemia.    Recommendations:   - Patient tolerated procedure well. No immediate complications  - Routine post-cath care  - Continue Cecille Sevilla - Pager# [phone]  3/7/2025  8:24 AM  Cardiovascular Fellow  [facility]

## 2025-03-07 NOTE — PROGRESS NOTES
Vasc band removed per protocol over 1 hour period. Patient tolerated well. Gauze/tegaderm dressing placed.    37

## 2025-03-11 ENCOUNTER — RESULTS FOLLOW-UP (OUTPATIENT)
Dept: TRANSPLANT | Facility: CLINIC | Age: 55
End: 2025-03-11

## 2025-03-12 ENCOUNTER — PATIENT MESSAGE (OUTPATIENT)
Dept: CARDIOLOGY | Facility: CLINIC | Age: 55
End: 2025-03-12
Payer: COMMERCIAL

## 2025-03-13 ENCOUNTER — TELEPHONE (OUTPATIENT)
Dept: CARDIOLOGY | Facility: CLINIC | Age: 55
End: 2025-03-13
Payer: COMMERCIAL

## 2025-03-13 ENCOUNTER — HOSPITAL ENCOUNTER (OUTPATIENT)
Dept: CARDIOLOGY | Facility: HOSPITAL | Age: 55
Discharge: HOME | End: 2025-03-13
Attending: INTERNAL MEDICINE
Payer: COMMERCIAL

## 2025-03-13 VITALS — HEIGHT: 68 IN | BODY MASS INDEX: 35.46 KG/M2 | WEIGHT: 234 LBS

## 2025-03-13 DIAGNOSIS — Z98.890 STATUS POST CORONARY ANGIOGRAM: ICD-10-CM

## 2025-03-13 DIAGNOSIS — Z94.1 STATUS POST HEART TRANSPLANT: Primary | ICD-10-CM

## 2025-03-13 DIAGNOSIS — Z94.1 STATUS POST HEART TRANSPLANT: ICD-10-CM

## 2025-03-13 LAB
UPPER ARTERIAL RIGHT ARM AXILLARY SYS MAX: 62 CM/S
UPPER ARTERIAL RIGHT ARM BRACHIAL SYS MAX: 79 CM/S
UPPER ARTERIAL RIGHT ARM RADIAL SYS MAX: 0 CM/S
UPPER ARTERIAL RIGHT ARM SUBCLAVIAN SYS MAX: 142 CM/S
UPPER ARTERIAL RIGHT ARM ULNAR SYS MAX: 85 CM/S

## 2025-03-13 PROCEDURE — 93931 UPPER EXTREMITY STUDY: CPT | Mod: RT

## 2025-03-13 PROCEDURE — 93931 UPPER EXTREMITY STUDY: CPT | Mod: 26,RT,, | Performed by: INTERNAL MEDICINE

## 2025-03-13 NOTE — TELEPHONE ENCOUNTER
Successful contact with patient RE: right arm swelling.  Pt is s/p angiogram with right arm swelling and c/o pain.  Request imaging of arm to be sent to portal.  Ordered ultrasound to be completed for eval.  Pt states it started on Friday and expected some relieve by now but reports still with discomfort with swelling.    Notification to Dr Hernandez.

## 2025-03-14 ENCOUNTER — DOCUMENTATION ONLY (OUTPATIENT)
Dept: CARDIOLOGY | Facility: CLINIC | Age: 55
End: 2025-03-14
Payer: COMMERCIAL

## 2025-03-14 ENCOUNTER — OFFICE VISIT (OUTPATIENT)
Dept: CARDIOLOGY | Facility: CLINIC | Age: 55
End: 2025-03-14
Payer: COMMERCIAL

## 2025-03-14 VITALS
WEIGHT: 233.94 LBS | HEIGHT: 68 IN | HEART RATE: 115 BPM | SYSTOLIC BLOOD PRESSURE: 111 MMHG | BODY MASS INDEX: 35.45 KG/M2 | DIASTOLIC BLOOD PRESSURE: 83 MMHG

## 2025-03-14 DIAGNOSIS — Z94.1 STATUS POST HEART TRANSPLANT: ICD-10-CM

## 2025-03-14 DIAGNOSIS — I70.208 RADIAL ARTERY OCCLUSION, RIGHT: Primary | ICD-10-CM

## 2025-03-14 PROCEDURE — 99999 PR PBB SHADOW E&M-EST. PATIENT-LVL IV: CPT | Mod: PBBFAC,,, | Performed by: INTERNAL MEDICINE
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
## 2025-03-14 NOTE — PROGRESS NOTES
Per Dr. Hernandez/Dr. Sawant patient to begin Eliquis for right arm thrombosis.  RX called to [facility] pharmacy:  10mg BID for 7 days followed by 5mg BID thereafter.   Patient aware of new prescription and understands dosage.

## 2025-03-19 DIAGNOSIS — I70.208 RADIAL ARTERY OCCLUSION, RIGHT: Primary | ICD-10-CM

## 2025-03-19 DIAGNOSIS — Z94.1 STATUS POST HEART TRANSPLANT: Primary | ICD-10-CM

## 2025-03-19 DIAGNOSIS — I70.208 RADIAL ARTERY OCCLUSION, RIGHT: ICD-10-CM

## 2025-03-19 DIAGNOSIS — Z94.1 STATUS POST HEART TRANSPLANT: ICD-10-CM

## 2025-04-03 ENCOUNTER — PATIENT MESSAGE (OUTPATIENT)
Dept: TRANSPLANT | Facility: CLINIC | Age: 55
End: 2025-04-03
Payer: COMMERCIAL

## 2025-04-03 ENCOUNTER — HOSPITAL ENCOUNTER (OUTPATIENT)
Dept: CARDIOLOGY | Facility: HOSPITAL | Age: 55
Discharge: HOME | End: 2025-04-03
Attending: INTERNAL MEDICINE
Payer: COMMERCIAL

## 2025-04-03 VITALS — HEIGHT: 68 IN | BODY MASS INDEX: 35.31 KG/M2 | WEIGHT: 233 LBS

## 2025-04-03 DIAGNOSIS — I70.208 RADIAL ARTERY OCCLUSION, RIGHT: ICD-10-CM

## 2025-04-03 DIAGNOSIS — Z94.1 STATUS POST HEART TRANSPLANT: ICD-10-CM

## 2025-04-03 LAB
UPPER ARTERIAL RIGHT ARM AXILLARY SYS MAX: 67 CM/S
UPPER ARTERIAL RIGHT ARM BRACHIAL SYS MAX: 79 CM/S
UPPER ARTERIAL RIGHT ARM RADIAL SYS MAX: 0 CM/S
UPPER ARTERIAL RIGHT ARM SUBCLAVIAN SYS MAX: 90 CM/S
UPPER ARTERIAL RIGHT ARM ULNAR SYS MAX: 106 CM/S

## 2025-04-03 PROCEDURE — 93931 UPPER EXTREMITY STUDY: CPT | Mod: 26,RT,, | Performed by: INTERNAL MEDICINE

## 2025-04-03 PROCEDURE — 93931 UPPER EXTREMITY STUDY: CPT | Mod: RT

## 2025-04-07 ENCOUNTER — TELEPHONE (OUTPATIENT)
Dept: CARDIOLOGY | Facility: CLINIC | Age: 55
End: 2025-04-07
Payer: COMMERCIAL

## 2025-04-07 DIAGNOSIS — I70.208 RADIAL ARTERY OCCLUSION, RIGHT: ICD-10-CM

## 2025-04-07 DIAGNOSIS — Z98.890 STATUS POST CORONARY ANGIOGRAM: Primary | ICD-10-CM

## 2025-04-07 NOTE — TELEPHONE ENCOUNTER
Successful contact with patient RE: results.  Per Dr Hernandez after review of ultrasound followup, patient instructed to remain on eliquis as prescriber.  Recheck in 1 month with another ultrasound.  Pt request to call medications into mail order [facility],  task completed. - Rx Eliquis 5 mg one tablet BID. 90 day supply, no refills.

## 2025-04-21 ENCOUNTER — PATIENT MESSAGE (OUTPATIENT)
Dept: TRANSPLANT | Facility: CLINIC | Age: 55
End: 2025-04-21
Payer: COMMERCIAL

## 2025-05-06 ENCOUNTER — HOSPITAL ENCOUNTER (OUTPATIENT)
Dept: CARDIOLOGY | Facility: HOSPITAL | Age: 55
Discharge: HOME | End: 2025-05-06
Attending: INTERNAL MEDICINE
Payer: COMMERCIAL

## 2025-05-06 VITALS — BODY MASS INDEX: 35.31 KG/M2 | WEIGHT: 233 LBS | HEIGHT: 68 IN

## 2025-05-06 DIAGNOSIS — I70.208 RADIAL ARTERY OCCLUSION, RIGHT: ICD-10-CM

## 2025-05-06 DIAGNOSIS — Z98.890 STATUS POST CORONARY ANGIOGRAM: ICD-10-CM

## 2025-05-06 LAB
UPPER ARTERIAL RIGHT ARM AXILLARY SYS MAX: 61 CM/S
UPPER ARTERIAL RIGHT ARM BRACHIAL SYS MAX: 104 CM/S
UPPER ARTERIAL RIGHT ARM RADIAL SYS MAX: 0 CM/S
UPPER ARTERIAL RIGHT ARM SUBCLAVIAN SYS MAX: 131 CM/S
UPPER ARTERIAL RIGHT ARM ULNAR SYS MAX: 89 CM/S

## 2025-05-06 PROCEDURE — 93931 UPPER EXTREMITY STUDY: CPT | Mod: RT

## 2025-05-07 ENCOUNTER — TELEPHONE (OUTPATIENT)
Dept: CARDIOLOGY | Facility: CLINIC | Age: 55
End: 2025-05-07
Payer: COMMERCIAL

## 2025-05-07 DIAGNOSIS — I70.208 RADIAL ARTERY OCCLUSION, RIGHT: ICD-10-CM

## 2025-05-07 DIAGNOSIS — Z98.890 STATUS POST CORONARY ANGIOGRAM: Primary | ICD-10-CM

## 2025-05-07 NOTE — TELEPHONE ENCOUNTER
Successful contact with patient -  notification of recent U/S right wrist - notification with continued anticoagulation to continue at 5mg BID.  Next scan planned for one month and then clinic visit to discuss and determine treatment plan.  Pt voiced understanding.

## 2025-05-14 DIAGNOSIS — Z94.1 STATUS POST HEART TRANSPLANT: ICD-10-CM

## 2025-05-14 RX ORDER — MYCOPHENOLATE MOFETIL 250 MG/1
CAPSULE ORAL
Qty: 240 CAPSULE | Refills: 11 | Status: SHIPPED | OUTPATIENT
Start: 2025-05-14

## 2025-05-15 ENCOUNTER — PATIENT MESSAGE (OUTPATIENT)
Dept: TRANSPLANT | Facility: CLINIC | Age: 55
End: 2025-05-15
Payer: COMMERCIAL

## 2025-05-16 ENCOUNTER — PATIENT MESSAGE (OUTPATIENT)
Dept: CARDIOLOGY | Facility: CLINIC | Age: 55
End: 2025-05-16
Payer: COMMERCIAL

## 2025-05-16 ENCOUNTER — TELEPHONE (OUTPATIENT)
Dept: CARDIOLOGY | Facility: CLINIC | Age: 55
End: 2025-05-16
Payer: COMMERCIAL

## 2025-06-04 ENCOUNTER — HOSPITAL ENCOUNTER (OUTPATIENT)
Dept: CARDIOLOGY | Facility: HOSPITAL | Age: 55
Discharge: HOME | End: 2025-06-04
Attending: INTERNAL MEDICINE
Payer: COMMERCIAL

## 2025-06-04 VITALS
HEIGHT: 68 IN | SYSTOLIC BLOOD PRESSURE: 118 MMHG | BODY MASS INDEX: 35.31 KG/M2 | WEIGHT: 233 LBS | DIASTOLIC BLOOD PRESSURE: 80 MMHG

## 2025-06-04 DIAGNOSIS — I70.208 RADIAL ARTERY OCCLUSION, RIGHT: ICD-10-CM

## 2025-06-04 DIAGNOSIS — Z98.890 STATUS POST CORONARY ANGIOGRAM: ICD-10-CM

## 2025-06-04 LAB
RIGHT AXILLARY ARTERY MAPPING: 61 CM
RIGHT DIST SUBCLAVIAN ARTERY: 100 CM
RIGHT MID SUBCLAVIAN ARTERY: 95 CM
RIGHT PROX SUBCLAVIAN ARTERY: 113 CM
UPPER ARTERIAL RIGHT ARM AXILLARY SYS MAX: 61 CM/S
UPPER ARTERIAL RIGHT ARM BRACHIAL SYS MAX: 70 CM/S
UPPER ARTERIAL RIGHT ARM SUBCLAVIAN SYS MAX: 113 CM/S
UPPER ARTERIAL RIGHT ARM ULNAR SYS MAX: 100 CM/S

## 2025-06-04 PROCEDURE — 93931 UPPER EXTREMITY STUDY: CPT | Mod: RT

## 2025-06-09 ENCOUNTER — OFFICE VISIT (OUTPATIENT)
Dept: CARDIOLOGY | Facility: CLINIC | Age: 55
End: 2025-06-09
Payer: COMMERCIAL

## 2025-06-09 DIAGNOSIS — I70.208 OCCLUSION OF RIGHT RADIAL ARTERY: Primary | ICD-10-CM

## 2025-06-09 PROCEDURE — 99499 UNLISTED E&M SERVICE: CPT | Mod: 95,,, | Performed by: INTERNAL MEDICINE

## 2025-06-09 RX ORDER — AMOXICILLIN 500 MG/1
500 TABLET, FILM COATED ORAL EVERY 8 HOURS
COMMUNITY
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
## 2025-06-10 ENCOUNTER — PATIENT MESSAGE (OUTPATIENT)
Dept: TRANSPLANT | Facility: CLINIC | Age: 55
End: 2025-06-10
Payer: COMMERCIAL

## 2025-06-25 NOTE — NURSING
Pt has appt 7/9. Please see monitor results below from Dr. Maldonado:    Interpretation Summary    This is a event monitor for 30 days showing that the predominant rhythm is sinus rhythm  Maximum heart is 141  Minimum is 55  run of nonsustained ventricular tachycardia was noted which was for 8.2 seconds  EP appointment ASAP     Pt up to bedside commode 510 ml clear yellow urine. Post void bladder scan performed at this time 1)26ml 2)29ml 3)27ml (repeated for accuracy). Results conveyed to Dr Contreras.

## 2025-07-09 NOTE — DISCHARGE INSTRUCTIONS
To confirm, your doctor has instructed you: Surgery is scheduled for 5/31/2023.     Pre admit office will call the afternoon prior to surgery between 1PM and 3PM with arrival time.    Surgery will be at [facility],  The address is [address]. [place] [address].      IMPORTANT INSTRUCTIONS!    Do not eat or drink after 12 midnight, including water. Do not smoke or use chewing tobacco after 12 midnight  OK to brush teeth, but no gum, candy, or mints!      *Take only these medicines with a small swallow of water-morning of surgery*     Protonix & Effexor        ____ Stop Aspirin, Ibuprofen, Motrin and Aleve at least 5-7 days before surgery, unless otherwise instructed by your doctor, or the nurse.   You MAY use Tylenol/acetaminophen until day of surgery.      ____  If you take diabetic medication, do NOT take morning of surgery unless instructed by Doctor. Metformin must be stopped 24 hrs prior to surgery time.       ____ Stop taking any Fish Oil supplements or Vitamins at least 5 days prior to surgery, unless instructed otherwise by your Doctor.       Please notify MD office if you have an active infection, currently taking antibiotics or received a vaccination within the past 7 days.    You may be required to provide a urine sample prior to procedure;   Please ask  for a specimen cup if you need to use the restroom prior to being called into pre-op.    Bathing Instructions: The night before surgery and the morning prior to coming to the hospital:    - Shower & rinse your body as usual with anti-bacterial Soap (Dial or Lever 2000)   -Hibiclens (if indicated) use AFTER anti-bacterial soap; 1 packet PM/1 packet in AM on surgical site only   -Do not use hibiclens on your head, face, or genitals.    -Do not wash with anti-bacterial soap after you use the hibiclens.    -Do not shave surgical site 5-7 days prior to surgery.    -Pubic hair 7 days prior to surgery (gyn pt's).      Pediatric  patients do not need to use anti-bacterial soap or Hibiclens.             After Bathing:   __ No powder, lotions, creams, or body spray to skin     __No deodorant for any breast procedure, PORT, or upper arm surgery     __ No makeup, mascara, nail polish or artificial nails       **SURGERY WILL BE CANCELLED IF ARTIFICIAL/NAIL POLISH IS PRESENT!!!**    __ Please remove all piercings and leave all jewelry at home.    **SURGERY WILL BE CANCELLED IF PIERCINGS ARE PRESENT!!!**      __ Dentures, Hearing Aids and Contact Lens need to be removed prior to the start of surgery.      __ Wear clean, loose-fitting clothing. Allow for dressings/bandages/surgical equipment     __ You must have transportation, and they MUST stay the entire time.         [facility] Visitor/Ride Policy:   Only 1 adult allowed (over the age of 18) to accompany you and MUST STAY through the entire length of admission.     -Must have a ride home from a responsible adult that you know and trust.    -Medical Transport, Uber or Lyft can only be used if patient has a responsible adult to accompany them during ride home.  Pediatric patients are encouraged to have 2 adults accompany them to the surgery center.     ~Your ride MUST STAY the entire time until you are discharged~        Post-Op Instructions: You will receive surgery post-op instructions by your Discharge Nurse prior to going home.     Surgical Site Infection:   Prevention of surgical site infections:   -Keep incisions clean and dry.   -Do not soak/submerge incisions in water until completely healed.   -Do not apply lotions, powders, creams, or deodorants to site.   -Always make sure hands are cleaned with antibacterial soap/ alcohol-based  prior to touching the surgical site.       Signs and symptoms:               -Redness and pain around the area where you had surgery               -Drainage of cloudy fluid from your surgical wound               -Fever over 100.4 or chills     >>>Call  Surgeon office/on-call Surgeon if you experience any of these signs & symptoms post-surgery @ [phone].       *Please Call [facility] Pre-Admit Department for surgery instruction questions:  [phone]    *Payment questions:  [phone]    *Billing questions:  [phone]    change of breathing pattern/oral hygiene/position upright (89Y)/cough/gurgly voice/fever/pneumonia/throat clearing/upper respiratory infection

## 2025-09-05 DIAGNOSIS — Z94.1 HEART TRANSPLANTED: ICD-10-CM

## 2025-09-05 RX ORDER — SIROLIMUS 1 MG/1
3 TABLET, FILM COATED ORAL
Qty: 90 TABLET | Refills: 11 | Status: SHIPPED | OUTPATIENT
Start: 2025-09-05

## (undated) DEVICE — PACK BASIC SETUP SC BR

## (undated) DEVICE — DRAIN CHEST DRY SUCTION

## (undated) DEVICE — SUT VICRYL 4-0 18 P-3

## (undated) DEVICE — SHEATH INTRODUCER 7FR 11CM

## (undated) DEVICE — CATH SWAN GANZ STND 7FR

## (undated) DEVICE — GLOVE SURG BIOGEL LATEX SZ 7.5

## (undated) DEVICE — DRAPE SURG W/TWL 17 5/8X23

## (undated) DEVICE — DRAPE IOBAN 2 STERI

## (undated) DEVICE — Device

## (undated) DEVICE — WIRE GUIDE SAFE-T-J .035 260CM

## (undated) DEVICE — SUT SILK BLK BR. 2 2-60

## (undated) DEVICE — PAD ADHESIVE TAPE REMOVER

## (undated) DEVICE — GLOVE BIOGEL PI MICRO INDIC 7

## (undated) DEVICE — SEE MEDLINE ITEM 156894

## (undated) DEVICE — KIT SAHARA DRAPE DRAW/LIFT

## (undated) DEVICE — CLOSURE SKIN STERI STRIP 1/4X3

## (undated) DEVICE — SUT 2/0 30IN ETHIBOND

## (undated) DEVICE — KIT COPILOT VALVE HEMO TOOL

## (undated) DEVICE — CONNECTOR 1/4X3/16X3/16 Y

## (undated) DEVICE — SET MICROPUNCTURE 5FR 501NT

## (undated) DEVICE — BOOT AIR FLUID HEEL ADLT STD

## (undated) DEVICE — SUT VICRYL BR 1 GEN 27 CT-1

## (undated) DEVICE — BANDAGE ESMARK 6X12

## (undated) DEVICE — CLOSURE SKIN STERI STRIP 1/4X4

## (undated) DEVICE — STOPCOCK 3-WAY

## (undated) DEVICE — SET DECANTER MEDICHOICE

## (undated) DEVICE — KIT PROBE COVER WITH GEL

## (undated) DEVICE — TRAY HEART

## (undated) DEVICE — KIT MICROINTRODUCE MINI 5X10CM

## (undated) DEVICE — PAD DEFIB CADENCE ADULT R2

## (undated) DEVICE — DRAPE SLUSH WARMER WITH DISC

## (undated) DEVICE — STAPLER SKIN PROXIMATE WIDE

## (undated) DEVICE — SEE MEDLINE ITEM 146308

## (undated) DEVICE — KIT GLIDESHEATH SLEND 6FR 10CM

## (undated) DEVICE — KIT CUSTOM MANIFOLD

## (undated) DEVICE — CLOSURE SKIN STERI STRIP 1/2X4

## (undated) DEVICE — SWABSTICK BENZOIN 4 IN

## (undated) DEVICE — DRESSING AQUACEL SACRAL 9 X 9

## (undated) DEVICE — ADHESIVE DERMABOND ADVANCED

## (undated) DEVICE — DRESSING TRANS 4X4 TEGADERM

## (undated) DEVICE — RETRACTOR OCTOBASE INSERT HOLD

## (undated) DEVICE — ELECTRODE PAD DEFIB STERILE

## (undated) DEVICE — GAUZE SPONGE 4X4 12PLY

## (undated) DEVICE — BELLOW CANN HEMOBLAST 1.65GR

## (undated) DEVICE — TRANSDUCER ADULT DISP

## (undated) DEVICE — BANDAGE ELASTIC 2X5 VELCRO ST

## (undated) DEVICE — LOOP VESSEL BLUE MAXI

## (undated) DEVICE — SUT PROLENE 5-0 24 C-1 BL

## (undated) DEVICE — CATH WEDGE 6FR X 110CM

## (undated) DEVICE — DRAIN CHANNEL ROUND 19FR

## (undated) DEVICE — SUT PROLENE 4-0 SH BLU 36IN

## (undated) DEVICE — HEMOSTAT SURGICEL NU-KNIT 6X9

## (undated) DEVICE — SYR B-D DISP CONTROL 10CC100/C

## (undated) DEVICE — KIT MICROINTRO 4F .018X40X7CM

## (undated) DEVICE — SEE MEDLINE ITEM 156911

## (undated) DEVICE — PAD CAST SPECIALIST STRL 4

## (undated) DEVICE — DRAPE T EXTRM SURG 121X128X90

## (undated) DEVICE — ELECTRODE EXTENDED BLADE

## (undated) DEVICE — APPLICATOR CHLORAPREP ORN 26ML

## (undated) DEVICE — HEMOSTAT VASC BAND REG 24CM

## (undated) DEVICE — CORD BIPOLAR 12 FOOT

## (undated) DEVICE — BANDAGE DERMACEA STRETCH 4X1IN

## (undated) DEVICE — HOSE DUAL W/CPC CONNECTORS

## (undated) DEVICE — TOURNIQUET SB QC DP 18X4IN

## (undated) DEVICE — SUT 4-0 ETHILON 18 PS-2

## (undated) DEVICE — GLOVE BIOGEL PI MICRO SZ 7.5

## (undated) DEVICE — GUIDEWIRE EMERALD 150CM PTFE

## (undated) DEVICE — DRESSING TELFA STRL 4X3 LF

## (undated) DEVICE — ELECTRODE REM PLYHSV RETURN 9

## (undated) DEVICE — SOL 9P NACL IRR PIC IL

## (undated) DEVICE — SPONGE GAUZE 16PLY 4X4

## (undated) DEVICE — TRAY CATH LAB OMC

## (undated) DEVICE — FORCEP BIOPSY 50CM

## (undated) DEVICE — GUIDE LAUNCHER 6FR EBU 3.5

## (undated) DEVICE — BLADE 4 INCH EDGE UN-INS

## (undated) DEVICE — GUIDEWIRE SUPRA CORE 035 190CM

## (undated) DEVICE — CONTAINER SPECIMEN STRL 4OZ

## (undated) DEVICE — BLADE SURG #15 CARBON STEEL

## (undated) DEVICE — OMNIPAQUE 350 200ML

## (undated) DEVICE — SCRUB 10% POVIDONE IODINE 4OZ

## (undated) DEVICE — DRESSING ABSRBNT ISLAND 3.6X8

## (undated) DEVICE — SEE MEDLINE ITEM 157187

## (undated) DEVICE — MERIT ANGIOPLASTY PACK

## (undated) DEVICE — INTRODUCER 7FR 45CM

## (undated) DEVICE — SOL NS 1000CC

## (undated) DEVICE — PACK UPPER EXTREMITY BAPTIST

## (undated) DEVICE — KIT URINARY CATH URINE METER

## (undated) DEVICE — SUT MONOCRYL 4-0 UD P-3 18

## (undated) DEVICE — COVERS PROBE NR-48 STERILE

## (undated) DEVICE — SPIKE CONTRAST CONTROLLER

## (undated) DEVICE — SUT SILK 2-0 SH 18IN BLACK

## (undated) DEVICE — SUT VICRYL PLUS 3-0 PS2 18

## (undated) DEVICE — SUT ETHIBOND XTRA 1 CTX

## (undated) DEVICE — TOWEL OR DISP STRL BLUE 4/PK

## (undated) DEVICE — SYR LUER-LOCK STERILE 3ML

## (undated) DEVICE — SUT PROLENE 4-0 RB-1 BL MO

## (undated) DEVICE — SYR 10CC LUER LOCK

## (undated) DEVICE — SPONGE DERMACEA GAUZE 4X4

## (undated) DEVICE — BUCKET PLASTER DISPOSABLE

## (undated) DEVICE — OMNIPAQUE CONTRAST 350MG/100ML

## (undated) DEVICE — INTRODUCER RX PSI KIT 8.5 FR

## (undated) DEVICE — NDL HYPO 27G X 1 1/2

## (undated) DEVICE — CATH THORACIC 32FR ST

## (undated) DEVICE — SUT 6 18IN STEEL MONO CCS

## (undated) DEVICE — SUT PROLENE 5-0 36IN C-1

## (undated) DEVICE — DRAPE SPLIT STERILE

## (undated) DEVICE — SPIKE SHORT LG BORE 1-WAY 2IN

## (undated) DEVICE — SEE MEDLINE ITEM 146417

## (undated) DEVICE — GOWN POLY REINF BRTH SLV XL

## (undated) DEVICE — DRESSING N ADH OIL EMUL 3X3

## (undated) DEVICE — PAD K-THERMIA 24IN X 60IN

## (undated) DEVICE — SUT 3/0 48IN PROLENE BL MO

## (undated) DEVICE — CANISTER PREVENA PLUS 150ML

## (undated) DEVICE — SUT 2/0 36IN ETHIBOND EXCE

## (undated) DEVICE — ADHESIVE MASTISOL VIAL 48/BX

## (undated) DEVICE — INSERTS STEALTH FIBRA SIZE 5

## (undated) DEVICE — BLADE STERN 65.8MM

## (undated) DEVICE — BLADE SAW STERNAL 5/BX

## (undated) DEVICE — SUT 3-0 CTD VICRYL 27IN PS

## (undated) DEVICE — SLING ARM LARGE FOAM STRAP

## (undated) DEVICE — CATH IMPULSE 5F 100CM FR4

## (undated) DEVICE — PAD RADIOLUCENT STAT ADULT

## (undated) DEVICE — SUT BONE WAX 2.5 GRMS 12/BX

## (undated) DEVICE — CATH INFINITI JUDKINS JR4

## (undated) DEVICE — VAC WOUND DISPOSABLE PREVENA

## (undated) DEVICE — DRAPE ANGIO BRACH 38X44IN

## (undated) DEVICE — SEE MEDLINE ITEM 157131

## (undated) DEVICE — NDL BOX COUNTER

## (undated) DEVICE — BLADE SCALP OPHTL RND TIP

## (undated) DEVICE — SEE MEDLINE ITEM 152523

## (undated) DEVICE — GUIDE WIRE BMW 014 X190

## (undated) DEVICE — GUIDE WIRE WHOLEY EXCHANGE 300

## (undated) DEVICE — TAPE SURG DURAPORE 2 SGL USE

## (undated) DEVICE — BANDAGE CONFORM 3IN STRL

## (undated) DEVICE — SUT 4/0 18IN ETHILON BL P3

## (undated) DEVICE — BLADE LONG 31.0MM X 9.0MM

## (undated) DEVICE — STOCKINET TUBULAR 1 PLY 6X60IN

## (undated) DEVICE — SPLINT PLASTER FAST SET 5X30IN

## (undated) DEVICE — BANDAGE MATRIX HK LOOP 4IN 5YD

## (undated) DEVICE — WIPE ESENTA BARR STNG FREE 3ML

## (undated) DEVICE — COVER LIGHT HANDLE 80/CA

## (undated) DEVICE — CATH THOR STND RGHT ANG 32FR

## (undated) DEVICE — GUIDE LAUNCHER 6FR JL 3.5

## (undated) DEVICE — DRAPE THREE-QTR REINF 53X77IN

## (undated) DEVICE — PACK SET UP CONVERTORS

## (undated) DEVICE — HOLDER TUBE

## (undated) DEVICE — BANDAGE ESMARK ELASTIC ST 4X9

## (undated) DEVICE — VALVE ULTRASITE MALE LUER

## (undated) DEVICE — PROTECTION STATION PLUS

## (undated) DEVICE — SLING ARM X-LARGE FOAM STRAP

## (undated) DEVICE — CATH WEDGE 6FR X 60CM

## (undated) DEVICE — TAPE MEDIPORE 4IN X 2YDS

## (undated) DEVICE — CANNULA VESSEL FREE FLOW

## (undated) DEVICE — SPONGE LAP 18X18 PREWASHED

## (undated) DEVICE — COVER SET UP STRL 54X54 20/BX

## (undated) DEVICE — HEMOSTAT VASC BAND LONG 27CM

## (undated) DEVICE — MANIFOLD 4 PORT

## (undated) DEVICE — SUT VICRYL 2-0 36 CT-1

## (undated) DEVICE — DRESSING TRANS 8X12 TEGADERM

## (undated) DEVICE — TAPE SURG MEDIPORE 6X72IN

## (undated) DEVICE — GLOVE BIOGEL ECLIPSE SZ 6.5

## (undated) DEVICE — FOGERTY SOFT JAW DISP 2/PK

## (undated) DEVICE — KIT CO-PILOT

## (undated) DEVICE — PACK UNIVERSAL SPLIT II

## (undated) DEVICE — DRAPE U SPLIT SHEET 54X76IN

## (undated) DEVICE — CATH EAGLE EYE PLATINUM

## (undated) DEVICE — SUT PROLENE 5-0 BL C-1 4-24

## (undated) DEVICE — DRESSING ADH ISLAND 3.6 X 14

## (undated) DEVICE — SHOE POST-OP VEL CLOS M/MD